# Patient Record
Sex: MALE | Race: BLACK OR AFRICAN AMERICAN | NOT HISPANIC OR LATINO
[De-identification: names, ages, dates, MRNs, and addresses within clinical notes are randomized per-mention and may not be internally consistent; named-entity substitution may affect disease eponyms.]

---

## 2016-09-14 RX ORDER — ATENOLOL 25 MG/1
1 TABLET ORAL
Qty: 0 | Refills: 0 | COMMUNITY
Start: 2016-09-14

## 2017-11-07 ENCOUNTER — APPOINTMENT (OUTPATIENT)
Dept: TRANSPLANT | Facility: CLINIC | Age: 67
End: 2017-11-07
Payer: COMMERCIAL

## 2017-11-07 ENCOUNTER — APPOINTMENT (OUTPATIENT)
Dept: NEPHROLOGY | Facility: CLINIC | Age: 67
End: 2017-11-07
Payer: COMMERCIAL

## 2017-11-07 VITALS
WEIGHT: 219 LBS | DIASTOLIC BLOOD PRESSURE: 71 MMHG | HEART RATE: 87 BPM | TEMPERATURE: 99.1 F | HEIGHT: 70 IN | RESPIRATION RATE: 17 BRPM | SYSTOLIC BLOOD PRESSURE: 122 MMHG | BODY MASS INDEX: 31.35 KG/M2

## 2017-11-07 PROCEDURE — 99205 OFFICE O/P NEW HI 60 MIN: CPT

## 2017-11-07 PROCEDURE — 99204 OFFICE O/P NEW MOD 45 MIN: CPT

## 2017-11-13 ENCOUNTER — APPOINTMENT (OUTPATIENT)
Dept: TRANSPLANT | Facility: CLINIC | Age: 67
End: 2017-11-13

## 2017-11-14 ENCOUNTER — APPOINTMENT (OUTPATIENT)
Dept: INFECTIOUS DISEASE | Facility: CLINIC | Age: 67
End: 2017-11-14
Payer: MEDICARE

## 2017-11-14 ENCOUNTER — LABORATORY RESULT (OUTPATIENT)
Age: 67
End: 2017-11-14

## 2017-11-14 VITALS
RESPIRATION RATE: 18 BRPM | WEIGHT: 220 LBS | TEMPERATURE: 97.3 F | OXYGEN SATURATION: 88 % | HEART RATE: 81 BPM | DIASTOLIC BLOOD PRESSURE: 71 MMHG | HEIGHT: 70 IN | SYSTOLIC BLOOD PRESSURE: 122 MMHG | BODY MASS INDEX: 31.5 KG/M2

## 2017-11-14 DIAGNOSIS — A53.0 LATENT SYPHILIS, UNSPECIFIED AS EARLY OR LATE: ICD-10-CM

## 2017-11-14 PROCEDURE — 99204 OFFICE O/P NEW MOD 45 MIN: CPT

## 2017-11-21 LAB — T PALLIDUM AB SER QL IA: POSITIVE

## 2017-12-13 ENCOUNTER — APPOINTMENT (OUTPATIENT)
Dept: CARDIOLOGY | Facility: CLINIC | Age: 67
End: 2017-12-13
Payer: COMMERCIAL

## 2017-12-13 ENCOUNTER — NON-APPOINTMENT (OUTPATIENT)
Age: 67
End: 2017-12-13

## 2017-12-13 VITALS
HEIGHT: 70 IN | DIASTOLIC BLOOD PRESSURE: 55 MMHG | WEIGHT: 216 LBS | SYSTOLIC BLOOD PRESSURE: 92 MMHG | OXYGEN SATURATION: 98 % | HEART RATE: 84 BPM | BODY MASS INDEX: 30.92 KG/M2

## 2017-12-13 PROCEDURE — 93000 ELECTROCARDIOGRAM COMPLETE: CPT

## 2017-12-13 PROCEDURE — 99203 OFFICE O/P NEW LOW 30 MIN: CPT

## 2017-12-13 RX ORDER — AMOXICILLIN 500 MG/1
500 CAPSULE ORAL
Qty: 4 | Refills: 0 | Status: DISCONTINUED | COMMUNITY
Start: 2017-11-24 | End: 2017-12-13

## 2018-03-06 ENCOUNTER — APPOINTMENT (OUTPATIENT)
Dept: ULTRASOUND IMAGING | Facility: CLINIC | Age: 68
End: 2018-03-06
Payer: COMMERCIAL

## 2018-03-06 ENCOUNTER — OUTPATIENT (OUTPATIENT)
Dept: OUTPATIENT SERVICES | Facility: HOSPITAL | Age: 68
LOS: 1 days | End: 2018-03-06
Payer: COMMERCIAL

## 2018-03-06 ENCOUNTER — APPOINTMENT (OUTPATIENT)
Dept: RADIOLOGY | Facility: CLINIC | Age: 68
End: 2018-03-06
Payer: COMMERCIAL

## 2018-03-06 DIAGNOSIS — Z90.5 ACQUIRED ABSENCE OF KIDNEY: Chronic | ICD-10-CM

## 2018-03-06 DIAGNOSIS — Z01.818 ENCOUNTER FOR OTHER PREPROCEDURAL EXAMINATION: ICD-10-CM

## 2018-03-06 DIAGNOSIS — I77.0 ARTERIOVENOUS FISTULA, ACQUIRED: Chronic | ICD-10-CM

## 2018-03-06 DIAGNOSIS — Z00.8 ENCOUNTER FOR OTHER GENERAL EXAMINATION: ICD-10-CM

## 2018-03-06 PROCEDURE — 71046 X-RAY EXAM CHEST 2 VIEWS: CPT | Mod: 26

## 2018-03-06 PROCEDURE — 93978 VASCULAR STUDY: CPT

## 2018-03-06 PROCEDURE — 76700 US EXAM ABDOM COMPLETE: CPT | Mod: 26

## 2018-03-06 PROCEDURE — 71046 X-RAY EXAM CHEST 2 VIEWS: CPT

## 2018-03-06 PROCEDURE — 93978 VASCULAR STUDY: CPT | Mod: 26

## 2018-03-06 PROCEDURE — 76700 US EXAM ABDOM COMPLETE: CPT

## 2018-03-22 ENCOUNTER — CHART COPY (OUTPATIENT)
Age: 68
End: 2018-03-22

## 2018-04-03 ENCOUNTER — OUTPATIENT (OUTPATIENT)
Dept: OUTPATIENT SERVICES | Facility: HOSPITAL | Age: 68
LOS: 1 days | Discharge: HOME | End: 2018-04-03
Payer: MEDICARE

## 2018-04-03 DIAGNOSIS — N19 UNSPECIFIED KIDNEY FAILURE: ICD-10-CM

## 2018-04-03 DIAGNOSIS — Z90.5 ACQUIRED ABSENCE OF KIDNEY: Chronic | ICD-10-CM

## 2018-04-03 DIAGNOSIS — I77.0 ARTERIOVENOUS FISTULA, ACQUIRED: Chronic | ICD-10-CM

## 2018-04-03 LAB
BUN SERPL-MCNC: 38 MG/DL — HIGH (ref 7–23)
CALCIUM SERPL-MCNC: 8.6 MG/DL — SIGNIFICANT CHANGE UP (ref 8.4–10.5)
CHLORIDE SERPL-SCNC: 99 MMOL/L — SIGNIFICANT CHANGE UP (ref 98–107)
CO2 SERPL-SCNC: 23 MMOL/L — SIGNIFICANT CHANGE UP (ref 22–31)
CREAT SERPL-MCNC: 10.2 MG/DL — HIGH (ref 0.5–1.3)
GLUCOSE BLDC GLUCOMTR-MCNC: 72 MG/DL — SIGNIFICANT CHANGE UP (ref 70–99)
GLUCOSE BLDC GLUCOMTR-MCNC: 94 MG/DL — SIGNIFICANT CHANGE UP (ref 70–99)
GLUCOSE SERPL-MCNC: 99 MG/DL — SIGNIFICANT CHANGE UP (ref 70–99)
HBA1C BLD-MCNC: 5.9 % — HIGH (ref 4–5.6)
HCT VFR BLD CALC: 31.1 % — LOW (ref 39–50)
HGB BLD-MCNC: 10.2 G/DL — LOW (ref 13–17)
MCHC RBC-ENTMCNC: 32.8 % — SIGNIFICANT CHANGE UP (ref 32–36)
MCHC RBC-ENTMCNC: 33.1 PG — SIGNIFICANT CHANGE UP (ref 27–34)
MCV RBC AUTO: 101 FL — HIGH (ref 80–100)
NRBC # FLD: 0 — SIGNIFICANT CHANGE UP
PLATELET # BLD AUTO: 153 K/UL — SIGNIFICANT CHANGE UP (ref 150–400)
PMV BLD: 9.2 FL — SIGNIFICANT CHANGE UP (ref 7–13)
POTASSIUM SERPL-MCNC: 4 MMOL/L — SIGNIFICANT CHANGE UP (ref 3.5–5.3)
POTASSIUM SERPL-SCNC: 4 MMOL/L — SIGNIFICANT CHANGE UP (ref 3.5–5.3)
RBC # BLD: 3.08 M/UL — LOW (ref 4.2–5.8)
RBC # FLD: 16 % — HIGH (ref 10.3–14.5)
SODIUM SERPL-SCNC: 138 MMOL/L — SIGNIFICANT CHANGE UP (ref 135–145)
WBC # BLD: 5.55 K/UL — SIGNIFICANT CHANGE UP (ref 3.8–10.5)
WBC # FLD AUTO: 5.55 K/UL — SIGNIFICANT CHANGE UP (ref 3.8–10.5)

## 2018-04-03 PROCEDURE — 76937 US GUIDE VASCULAR ACCESS: CPT | Mod: 26

## 2018-04-03 PROCEDURE — 93010 ELECTROCARDIOGRAM REPORT: CPT

## 2018-04-03 PROCEDURE — 93458 L HRT ARTERY/VENTRICLE ANGIO: CPT | Mod: 26

## 2018-04-03 RX ORDER — SODIUM CHLORIDE 9 MG/ML
3 INJECTION INTRAMUSCULAR; INTRAVENOUS; SUBCUTANEOUS EVERY 8 HOURS
Qty: 0 | Refills: 0 | Status: DISCONTINUED | OUTPATIENT
Start: 2018-04-03 | End: 2018-04-18

## 2018-04-03 NOTE — H&P CARDIOLOGY - NEGATIVE CARDIOVASCULAR SYMPTOMS
no orthopnea/no chest pain/no peripheral edema/no paroxysmal nocturnal dyspnea/no palpitations/no claudication/no dyspnea on exertion

## 2018-04-03 NOTE — H&P CARDIOLOGY - RS GEN PE MLT RESP DETAILS PC
good air movement/no chest wall tenderness/clear to auscultation bilaterally/respirations non-labored/airway patent/breath sounds equal

## 2018-04-03 NOTE — H&P CARDIOLOGY - PMH
DM (diabetes mellitus)    ESRD (end stage renal disease) on dialysis    HTN (hypertension)    Kidney cancer, primary, with metastasis from kidney to other site  with no mets

## 2018-04-03 NOTE — H&P CARDIOLOGY - NEGATIVE NEUROLOGICAL SYMPTOMS
no syncope/no focal seizures/no difficulty walking/no transient paralysis/no loss of sensation/no confusion/no loss of consciousness/no hemiparesis/no tremors/no weakness/no paresthesias/no generalized seizures/no vertigo/no headache/no facial palsy

## 2018-04-03 NOTE — H&P CARDIOLOGY - PSH
AV fistula  2/2013/ left forearm  S/p nephrectomy  left 9/15/2015  S/p nephrectomy  right 12/10/2015

## 2018-04-03 NOTE — H&P CARDIOLOGY - HISTORY OF PRESENT ILLNESS
66 y/o M w/ PMH of HTN, DM, ESRD on HD presents for cardiac catheretization. 66 y/o M w/ PMH of HTN, DM, ESRD on HD presents for cardiac catheretization. Pt is being cleared for a kidney transplant and will need cardiac clearance prior to surgery. Pt denies N/V/D, fevers, chills, cough, palpitations, chest pain, substernal distress, syncope, dyspnea on exertion, orthopnea, nocturnal paroxysmal dyspnea, edema, cyanosis, heart murmurs, varicosities, phlebitis, claudication. 66 y/o M w/ PMH of Kidney CA s/p bilateral nephrectomy, HTN, DM, ESRD on home HD presents for cardiac catheretization. Pt is being cleared for a kidney transplant and will need cardiac clearance prior to surgery. Pt denies N/V/D, fevers, chills, cough, palpitations, chest pain, substernal distress, syncope, dyspnea on exertion, orthopnea, nocturnal paroxysmal dyspnea, edema, cyanosis, heart murmurs, varicosities, phlebitis, claudication.

## 2018-04-17 ENCOUNTER — OUTPATIENT (OUTPATIENT)
Dept: OUTPATIENT SERVICES | Facility: HOSPITAL | Age: 68
LOS: 1 days | End: 2018-04-17
Payer: COMMERCIAL

## 2018-04-17 ENCOUNTER — APPOINTMENT (OUTPATIENT)
Dept: CT IMAGING | Facility: CLINIC | Age: 68
End: 2018-04-17
Payer: COMMERCIAL

## 2018-04-17 DIAGNOSIS — Z90.5 ACQUIRED ABSENCE OF KIDNEY: Chronic | ICD-10-CM

## 2018-04-17 DIAGNOSIS — Z01.818 ENCOUNTER FOR OTHER PREPROCEDURAL EXAMINATION: ICD-10-CM

## 2018-04-17 DIAGNOSIS — I77.0 ARTERIOVENOUS FISTULA, ACQUIRED: Chronic | ICD-10-CM

## 2018-04-17 PROCEDURE — 74178 CT ABD&PLV WO CNTR FLWD CNTR: CPT

## 2018-04-17 PROCEDURE — 74178 CT ABD&PLV WO CNTR FLWD CNTR: CPT | Mod: 26

## 2018-05-25 ENCOUNTER — APPOINTMENT (OUTPATIENT)
Dept: UROLOGY | Facility: CLINIC | Age: 68
End: 2018-05-25
Payer: MEDICARE

## 2018-05-25 PROCEDURE — 99204 OFFICE O/P NEW MOD 45 MIN: CPT

## 2018-05-30 LAB — PSA SERPL-MCNC: 3.41 NG/ML

## 2018-06-12 ENCOUNTER — APPOINTMENT (OUTPATIENT)
Dept: UROLOGY | Facility: CLINIC | Age: 68
End: 2018-06-12
Payer: MEDICARE

## 2018-06-12 ENCOUNTER — OUTPATIENT (OUTPATIENT)
Dept: OUTPATIENT SERVICES | Facility: HOSPITAL | Age: 68
LOS: 1 days | End: 2018-06-12
Payer: MEDICARE

## 2018-06-12 VITALS
HEART RATE: 84 BPM | DIASTOLIC BLOOD PRESSURE: 75 MMHG | TEMPERATURE: 98.5 F | RESPIRATION RATE: 18 BRPM | SYSTOLIC BLOOD PRESSURE: 111 MMHG

## 2018-06-12 DIAGNOSIS — Z90.5 ACQUIRED ABSENCE OF KIDNEY: Chronic | ICD-10-CM

## 2018-06-12 DIAGNOSIS — I77.0 ARTERIOVENOUS FISTULA, ACQUIRED: Chronic | ICD-10-CM

## 2018-06-12 DIAGNOSIS — R35.0 FREQUENCY OF MICTURITION: ICD-10-CM

## 2018-06-12 PROCEDURE — 99213 OFFICE O/P EST LOW 20 MIN: CPT | Mod: 25

## 2018-06-12 PROCEDURE — 52000 CYSTOURETHROSCOPY: CPT

## 2018-06-20 DIAGNOSIS — Z86.03 PERSONAL HISTORY OF NEOPLASM OF UNCERTAIN BEHAVIOR: ICD-10-CM

## 2018-07-08 ENCOUNTER — MOBILE ON CALL (OUTPATIENT)
Age: 68
End: 2018-07-08

## 2018-07-13 ENCOUNTER — MOBILE ON CALL (OUTPATIENT)
Age: 68
End: 2018-07-13

## 2018-07-16 ENCOUNTER — TRANSCRIPTION ENCOUNTER (OUTPATIENT)
Age: 68
End: 2018-07-16

## 2018-07-17 ENCOUNTER — INPATIENT (INPATIENT)
Facility: HOSPITAL | Age: 68
LOS: 7 days | Discharge: HOME | DRG: 652 | End: 2018-07-25
Payer: MEDICARE

## 2018-07-17 VITALS
TEMPERATURE: 99 F | HEART RATE: 95 BPM | HEIGHT: 70.5 IN | WEIGHT: 282.19 LBS | DIASTOLIC BLOOD PRESSURE: 65 MMHG | RESPIRATION RATE: 18 BRPM | OXYGEN SATURATION: 99 % | SYSTOLIC BLOOD PRESSURE: 109 MMHG

## 2018-07-17 DIAGNOSIS — N18.6 END STAGE RENAL DISEASE: ICD-10-CM

## 2018-07-17 DIAGNOSIS — Z94.0 KIDNEY TRANSPLANT STATUS: ICD-10-CM

## 2018-07-17 DIAGNOSIS — Z94.9 TRANSPLANTED ORGAN AND TISSUE STATUS, UNSPECIFIED: ICD-10-CM

## 2018-07-17 DIAGNOSIS — Z90.5 ACQUIRED ABSENCE OF KIDNEY: Chronic | ICD-10-CM

## 2018-07-17 DIAGNOSIS — I77.0 ARTERIOVENOUS FISTULA, ACQUIRED: Chronic | ICD-10-CM

## 2018-07-17 LAB
ALBUMIN SERPL ELPH-MCNC: 4 G/DL — SIGNIFICANT CHANGE UP (ref 3.3–5)
ALP SERPL-CCNC: 90 U/L — SIGNIFICANT CHANGE UP (ref 40–120)
ALT FLD-CCNC: 15 U/L — SIGNIFICANT CHANGE UP (ref 10–45)
ANION GAP SERPL CALC-SCNC: 19 MMOL/L — HIGH (ref 5–17)
APTT BLD: 29.7 SEC — SIGNIFICANT CHANGE UP (ref 27.5–37.4)
AST SERPL-CCNC: 19 U/L — SIGNIFICANT CHANGE UP (ref 10–40)
BASOPHILS # BLD AUTO: 0 K/UL — SIGNIFICANT CHANGE UP (ref 0–0.2)
BASOPHILS NFR BLD AUTO: 0.6 % — SIGNIFICANT CHANGE UP (ref 0–2)
BILIRUB SERPL-MCNC: 0.2 MG/DL — SIGNIFICANT CHANGE UP (ref 0.2–1.2)
BLD GP AB SCN SERPL QL: NEGATIVE — SIGNIFICANT CHANGE UP
BUN SERPL-MCNC: 23 MG/DL — SIGNIFICANT CHANGE UP (ref 7–23)
CALCIUM SERPL-MCNC: 9.3 MG/DL — SIGNIFICANT CHANGE UP (ref 8.4–10.5)
CHLORIDE SERPL-SCNC: 98 MMOL/L — SIGNIFICANT CHANGE UP (ref 96–108)
CO2 SERPL-SCNC: 23 MMOL/L — SIGNIFICANT CHANGE UP (ref 22–31)
CREAT SERPL-MCNC: 9.53 MG/DL — HIGH (ref 0.5–1.3)
EOSINOPHIL # BLD AUTO: 0.4 K/UL — SIGNIFICANT CHANGE UP (ref 0–0.5)
EOSINOPHIL NFR BLD AUTO: 5 % — SIGNIFICANT CHANGE UP (ref 0–6)
GLUCOSE BLDC GLUCOMTR-MCNC: 100 MG/DL — HIGH (ref 70–99)
GLUCOSE BLDC GLUCOMTR-MCNC: 130 MG/DL — HIGH (ref 70–99)
GLUCOSE BLDC GLUCOMTR-MCNC: 95 MG/DL — SIGNIFICANT CHANGE UP (ref 70–99)
GLUCOSE SERPL-MCNC: 125 MG/DL — HIGH (ref 70–99)
HBA1C BLD-MCNC: 6 % — HIGH (ref 4–5.6)
HCT VFR BLD CALC: 33 % — LOW (ref 39–50)
HGB BLD-MCNC: 11 G/DL — LOW (ref 13–17)
INR BLD: 1 RATIO — SIGNIFICANT CHANGE UP (ref 0.88–1.16)
LYMPHOCYTES # BLD AUTO: 0.9 K/UL — LOW (ref 1–3.3)
LYMPHOCYTES # BLD AUTO: 12.3 % — LOW (ref 13–44)
MAGNESIUM SERPL-MCNC: 1.6 MG/DL — SIGNIFICANT CHANGE UP (ref 1.6–2.6)
MCHC RBC-ENTMCNC: 33.3 GM/DL — SIGNIFICANT CHANGE UP (ref 32–36)
MCHC RBC-ENTMCNC: 34.8 PG — HIGH (ref 27–34)
MCV RBC AUTO: 105 FL — HIGH (ref 80–100)
MONOCYTES # BLD AUTO: 0.8 K/UL — SIGNIFICANT CHANGE UP (ref 0–0.9)
MONOCYTES NFR BLD AUTO: 11.7 % — SIGNIFICANT CHANGE UP (ref 2–14)
NEUTROPHILS # BLD AUTO: 5 K/UL — SIGNIFICANT CHANGE UP (ref 1.8–7.4)
NEUTROPHILS NFR BLD AUTO: 70.6 % — SIGNIFICANT CHANGE UP (ref 43–77)
PHOSPHATE SERPL-MCNC: 3.9 MG/DL — SIGNIFICANT CHANGE UP (ref 2.5–4.5)
PLATELET # BLD AUTO: 142 K/UL — LOW (ref 150–400)
POTASSIUM SERPL-MCNC: 4.1 MMOL/L — SIGNIFICANT CHANGE UP (ref 3.5–5.3)
POTASSIUM SERPL-SCNC: 4.1 MMOL/L — SIGNIFICANT CHANGE UP (ref 3.5–5.3)
PROT SERPL-MCNC: 7.2 G/DL — SIGNIFICANT CHANGE UP (ref 6–8.3)
PROTHROM AB SERPL-ACNC: 10.8 SEC — SIGNIFICANT CHANGE UP (ref 9.8–12.7)
RBC # BLD: 3.15 M/UL — LOW (ref 4.2–5.8)
RBC # FLD: 14.6 % — HIGH (ref 10.3–14.5)
RH IG SCN BLD-IMP: POSITIVE — SIGNIFICANT CHANGE UP
RH IG SCN BLD-IMP: POSITIVE — SIGNIFICANT CHANGE UP
SODIUM SERPL-SCNC: 140 MMOL/L — SIGNIFICANT CHANGE UP (ref 135–145)
WBC # BLD: 7.1 K/UL — SIGNIFICANT CHANGE UP (ref 3.8–10.5)
WBC # FLD AUTO: 7.1 K/UL — SIGNIFICANT CHANGE UP (ref 3.8–10.5)

## 2018-07-17 PROCEDURE — 50360 RNL ALTRNSPLJ W/O RCP NFRCT: CPT

## 2018-07-17 PROCEDURE — 93010 ELECTROCARDIOGRAM REPORT: CPT

## 2018-07-17 PROCEDURE — 71045 X-RAY EXAM CHEST 1 VIEW: CPT | Mod: 26,77

## 2018-07-17 PROCEDURE — 99223 1ST HOSP IP/OBS HIGH 75: CPT | Mod: GC

## 2018-07-17 PROCEDURE — 71045 X-RAY EXAM CHEST 1 VIEW: CPT | Mod: 26

## 2018-07-17 PROCEDURE — 50605 INSERT URETERAL SUPPORT: CPT

## 2018-07-17 RX ORDER — BASILIXIMAB 20 MG/5ML
20 INJECTION, POWDER, FOR SOLUTION INTRAVENOUS ONCE
Qty: 0 | Refills: 0 | Status: DISCONTINUED | OUTPATIENT
Start: 2018-07-17 | End: 2018-07-17

## 2018-07-17 RX ORDER — CEFAZOLIN SODIUM 1 G
2000 VIAL (EA) INJECTION ONCE
Qty: 0 | Refills: 0 | Status: DISCONTINUED | OUTPATIENT
Start: 2018-07-17 | End: 2018-07-17

## 2018-07-17 NOTE — H&P ADULT - ASSESSMENT
67M presents for DDRT  - Will obtain full labs  - NPO  - Will proceed to operating room in afternoon of 7/17

## 2018-07-17 NOTE — H&P ADULT - NSHPLABSRESULTS_GEN_ALL_CORE
07-17    140  |  98  |  23  ----------------------------<  125<H>  4.1   |  23  |  9.53<H>    Ca    9.3      17 Jul 2018 10:21  Phos  3.9     07-17  Mg     1.6     07-17    TPro  7.2  /  Alb  4.0  /  TBili  0.2  /  DBili  x   /  AST  19  /  ALT  15  /  AlkPhos  90  07-17      CAPILLARY BLOOD GLUCOSE      POCT Blood Glucose.: 130 mg/dL (17 Jul 2018 09:54)      MEDICATIONS  (STANDING):    MEDICATIONS  (PRN):

## 2018-07-17 NOTE — CONSULT NOTE ADULT - SUBJECTIVE AND OBJECTIVE BOX
[facility] DIVISION OF KIDNEY DISEASES AND HYPERTENSION -- INITIAL CONSULT NOTE  --------------------------------------------------------------------------------  HPI: 67 year old male with a PMH of ESRD on HD (2015), DM, HTN, bilateral nephrectomy (2015) admitted for possible DDRT. Pt last HD was on Monday prior to presentation. Pt usually has HD MWF and follows with outpatient nephrologist Dr. Webb. Pt seen and examined. Pt denies CP, SOB or LE edema.      PAST HISTORY  --------------------------------------------------------------------------------  PAST MEDICAL & SURGICAL HISTORY:  ESRD (end stage renal disease) on dialysis  Kidney cancer, primary, with metastasis from kidney to other site: with no mets  HTN (hypertension)  DM (diabetes mellitus)  S/p nephrectomy: right 12/10/2015  S/p nephrectomy: left 9/15/2015  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    FAMILY HISTORY:  No pertinent family history in first degree relatives    PAST SOCIAL HISTORY:    ALLERGIES & MEDICATIONS  --------------------------------------------------------------------------------  Allergies    No Known Allergies    Intolerances      Standing Inpatient Medications    PRN Inpatient Medications      REVIEW OF SYSTEMS  --------------------------------------------------------------------------------  Gen: No weakness  Skin: No rashes  Head/Eyes/Ears/Mouth: No headache  Respiratory: No dyspnea  CV: No chest pain, PND, orthopnea  GI: No abdominal pain  : No increased frequency  MSK: No edema  Neuro: No dizziness/lightheadedness  Heme: No easy bruising or bleeding  All other systems were reviewed and are negative, except as noted.    VITALS/PHYSICAL EXAM  --------------------------------------------------------------------------------  T(C): 37.3 (07-17-18 @ 08:10), Max: 37.3 (07-17-18 @ 08:10)  HR: 95 (07-17-18 @ 08:10) (95 - 95)  BP: 109/65 (07-17-18 @ 08:10) (109/65 - 109/65)  RR: 18 (07-17-18 @ 08:10) (18 - 18)  SpO2: 99% (07-17-18 @ 08:10) (99% - 99%)  Wt(kg): --  Height (cm): 179.07 (07-17-18 @ 08:10)  Weight (kg): 128 (07-17-18 @ 08:10)  BMI (kg/m2): 39.9 (07-17-18 @ 08:10)  BSA (m2): 2.43 (07-17-18 @ 08:10)      Physical Exam:  	Gen: NAD  	Pulm: CTA B/L  	CV: RRR, S1S2; no rub  	Abd: +BS, soft, nontender/nondistended + midline healed scar  	: No suprapubic tenderness  	UE: Warm, no asterixis  	LE: Warm, no edema  	Psych: Normal affect and mood  	Skin: Warm, without rashes  	Vascular access:  L AVF     LABS/STUDIES  --------------------------------------------------------------------------------    140  |  98  |  23  ----------------------------<  125      [07-17-18 @ 10:21]  4.1   |  23  |  9.53        Ca     9.3     [07-17-18 @ 10:21]      Mg     1.6     [07-17-18 @ 10:21]      Phos  3.9     [07-17-18 @ 10:21]    TPro  7.2  /  Alb  4.0  /  TBili  0.2  /  DBili  x   /  AST  19  /  ALT  15  /  AlkPhos  90  [07-17-18 @ 10:21]    Creatinine Trend:  SCr 9.53 [07-17 @ 10:21]    HbA1c 5.9      [04-03-18 @ 14:09]    HBsAb 151.5      [09-14-16 @ 18:00]  HBsAg Nonreact      [09-14-16 @ 18:00]  HCV 0.07, Nonreact      [09-14-16 @ 18:00]

## 2018-07-17 NOTE — H&P ADULT - NSHPREVIEWOFSYSTEMS_GEN_ALL_CORE
The patient denies fever, chills; chest pain, SOB, palpitation; dizziness, weakness; nausea, vomiting; diarrhea, constipation; abdominal pain; bladder and bowel problems; leg swelling.

## 2018-07-17 NOTE — H&P ADULT - NSHPPHYSICALEXAM_GEN_ALL_CORE
Gen: WD, WN, in no acute distress.  Lungs: CTA B/L.  Cor: RRR, S1 S2, No M/G/R.  Abd: Soft, ND, NT,No HSM, +BS.  Ext: No clubbing, no edema. DP pulse on R palpable  Neuro: A/Ox3. No focal deficit.

## 2018-07-17 NOTE — CONSULT NOTE ADULT - PROBLEM SELECTOR RECOMMENDATION 9
ESRD on HD. Pt last outpatient HD was on 7/16/18. No acute indication for additional HD at this time. Monitor BMP

## 2018-07-17 NOTE — CONSULT NOTE ADULT - ASSESSMENT
67 year old male with a PMH of ESRD on HD (2015), DM, HTN, bilateral nephrectomy (2015) admitted for possible DDRT.

## 2018-07-17 NOTE — H&P ADULT - HISTORY OF PRESENT ILLNESS
67M with ESRD presents for DDRT on 7/17/18 67M with ESRD presents for DDRT on 18    EK17 normal sinus rhythm, nonspecific ST abnormality    * Stress 3/30/17 [place] ECG stress was neg for ischemia at submaximal heart rate. The myocardial perfusion imaging showed mild ischemia and no infarct. Gated study showed and EF 53%. Medium size mild severity defect in the mid inferior wall that is partially reversible and suggestive of ischemia. Rest gated analysis showed normal LV function with normal LV size.    * Cath 18 Non obstructive CAD LCX 20 stenosis RCA 20% stenosis. Non obstructive CAD. Cleared  by Nehemias “overall stress not too bad but would offer a cath if he has a donor….i think we can clear him without cath for listing and we can repeat stress this year if he is asymptomatic. Sometimes the inferior defect can be artifact.” Cancer Screenings:    * Colonoscopy: 17 9 mm polyp proximal transverse colon bx showed tubular adenoma, 4 mm polyp in the mid transverse colon bx showed tubular adenoma, 8 mm polyp in the distal transverse colon bx showed tubular adenoma diverticulosis in the sigmoid colon and in the descending colon. Terminal ileum bx showed benign therminal ileal mucosa without pathological diagnosis. Non bleeding hemorrhoids.    * Endoscopy: 17 Normal castroesophageal junction and esophagus, Z-line regular, 40 cm from the incisors, 2cm hiatl hernia, normal gastroesophageal junction cardia , gastric fundus, gastric body, prepyloric region of the stomach and pylorus, Antral gastritis bx showed benign atrium/fundic pattern mucosa without pathoigic diagnosis neg fot H Pylori. Normal examined duodenom.    * Cystoscopy 18 No lesions. Inspection of the bladder revealed bilateral orthotopic ureteral orifices. There was no bloody efflux from either side. There were also no stones, tumors, or diverticula seen within the bladder. The mucosa of the bladder all appeared to be grossly normal.    * PSA: 17 2.75 Imaging:    * CXR: 3/6/18 clear lungs    * US Abd/doppler: 3/6/18 Liver, Bile ducts, gallbladder, pancreas, spleen WNL. Bilateral Nephrectomies. Aorta and IVC are WNL. The abd aorta and iliac vasculature shows no evidence of stenosis or aneurysmal enlargment. The iliac veins are patent.    * CT Abd/Pelvis WAW IC 4/19/18 Non specific adrenal nodules. Left nodle measuring 2.8X 1.9cm. the right nodule measures 1.1 cm. The bladder is collapsed limiting its eval. Consider cystoscopy to evaluate possible ovoid filling defect within the bladder.    Serologies: Quant Gold:Neg Treponema Pallidum( +) VSV: (+ ) Toxo (+) CMV: ( - ) EBV: (+ ) Rubella: ( + ) HSV 1: (- ) HSV 2: (+ ) HIV: ( - ) HAV: (- ) HCV: ( - ) HBsAg: ( - ) HBcAb: ( - ) HBsAb: ( + )

## 2018-07-18 DIAGNOSIS — D89.9 DISORDER INVOLVING THE IMMUNE MECHANISM, UNSPECIFIED: ICD-10-CM

## 2018-07-18 DIAGNOSIS — I10 ESSENTIAL (PRIMARY) HYPERTENSION: ICD-10-CM

## 2018-07-18 DIAGNOSIS — E11.9 TYPE 2 DIABETES MELLITUS WITHOUT COMPLICATIONS: ICD-10-CM

## 2018-07-18 LAB
ALBUMIN SERPL ELPH-MCNC: 3.1 G/DL — LOW (ref 3.3–5)
ALBUMIN SERPL ELPH-MCNC: 3.5 G/DL — SIGNIFICANT CHANGE UP (ref 3.3–5)
ALP SERPL-CCNC: 65 U/L — SIGNIFICANT CHANGE UP (ref 40–120)
ALP SERPL-CCNC: 68 U/L — SIGNIFICANT CHANGE UP (ref 40–120)
ALT FLD-CCNC: 12 U/L — SIGNIFICANT CHANGE UP (ref 10–45)
ALT FLD-CCNC: 14 U/L — SIGNIFICANT CHANGE UP (ref 10–45)
ANION GAP SERPL CALC-SCNC: 13 MMOL/L — SIGNIFICANT CHANGE UP (ref 5–17)
ANION GAP SERPL CALC-SCNC: 18 MMOL/L — HIGH (ref 5–17)
ANION GAP SERPL CALC-SCNC: 19 MMOL/L — HIGH (ref 5–17)
ANION GAP SERPL CALC-SCNC: 20 MMOL/L — HIGH (ref 5–17)
APTT BLD: 27.4 SEC — LOW (ref 27.5–37.4)
AST SERPL-CCNC: 23 U/L — SIGNIFICANT CHANGE UP (ref 10–40)
AST SERPL-CCNC: 23 U/L — SIGNIFICANT CHANGE UP (ref 10–40)
BILIRUB SERPL-MCNC: 0.2 MG/DL — SIGNIFICANT CHANGE UP (ref 0.2–1.2)
BILIRUB SERPL-MCNC: 0.3 MG/DL — SIGNIFICANT CHANGE UP (ref 0.2–1.2)
BUN SERPL-MCNC: 24 MG/DL — HIGH (ref 7–23)
BUN SERPL-MCNC: 29 MG/DL — HIGH (ref 7–23)
BUN SERPL-MCNC: 34 MG/DL — HIGH (ref 7–23)
BUN SERPL-MCNC: 37 MG/DL — HIGH (ref 7–23)
CALCIUM SERPL-MCNC: 7.9 MG/DL — LOW (ref 8.4–10.5)
CALCIUM SERPL-MCNC: 8.1 MG/DL — LOW (ref 8.4–10.5)
CALCIUM SERPL-MCNC: 8.3 MG/DL — LOW (ref 8.4–10.5)
CALCIUM SERPL-MCNC: 8.4 MG/DL — SIGNIFICANT CHANGE UP (ref 8.4–10.5)
CHLORIDE SERPL-SCNC: 103 MMOL/L — SIGNIFICANT CHANGE UP (ref 96–108)
CHLORIDE SERPL-SCNC: 104 MMOL/L — SIGNIFICANT CHANGE UP (ref 96–108)
CHLORIDE SERPL-SCNC: 104 MMOL/L — SIGNIFICANT CHANGE UP (ref 96–108)
CHLORIDE SERPL-SCNC: 105 MMOL/L — SIGNIFICANT CHANGE UP (ref 96–108)
CO2 SERPL-SCNC: 16 MMOL/L — LOW (ref 22–31)
CO2 SERPL-SCNC: 16 MMOL/L — LOW (ref 22–31)
CO2 SERPL-SCNC: 18 MMOL/L — LOW (ref 22–31)
CO2 SERPL-SCNC: 22 MMOL/L — SIGNIFICANT CHANGE UP (ref 22–31)
CREAT ?TM UR-MCNC: 12 MG/DL — SIGNIFICANT CHANGE UP
CREAT SERPL-MCNC: 10.53 MG/DL — HIGH (ref 0.5–1.3)
CREAT SERPL-MCNC: 10.71 MG/DL — HIGH (ref 0.5–1.3)
CREAT SERPL-MCNC: 10.75 MG/DL — HIGH (ref 0.5–1.3)
CREAT SERPL-MCNC: 6.96 MG/DL — HIGH (ref 0.5–1.3)
GAS PNL BLDA: SIGNIFICANT CHANGE UP
GLUCOSE BLDC GLUCOMTR-MCNC: 178 MG/DL — HIGH (ref 70–99)
GLUCOSE BLDC GLUCOMTR-MCNC: 223 MG/DL — HIGH (ref 70–99)
GLUCOSE BLDC GLUCOMTR-MCNC: 242 MG/DL — HIGH (ref 70–99)
GLUCOSE SERPL-MCNC: 161 MG/DL — HIGH (ref 70–99)
GLUCOSE SERPL-MCNC: 244 MG/DL — HIGH (ref 70–99)
GLUCOSE SERPL-MCNC: 247 MG/DL — HIGH (ref 70–99)
GLUCOSE SERPL-MCNC: 249 MG/DL — HIGH (ref 70–99)
HBV CORE AB SER-ACNC: SIGNIFICANT CHANGE UP
HBV SURFACE AB SER-ACNC: 5.6 MIU/ML — LOW
HBV SURFACE AG SER-ACNC: SIGNIFICANT CHANGE UP
HCT VFR BLD CALC: 30 % — LOW (ref 39–50)
HCT VFR BLD CALC: 30.6 % — LOW (ref 39–50)
HCT VFR BLD CALC: 30.8 % — LOW (ref 39–50)
HCV AB S/CO SERPL IA: 0.27 S/CO — SIGNIFICANT CHANGE UP
HCV AB SERPL-IMP: SIGNIFICANT CHANGE UP
HGB BLD-MCNC: 10.1 G/DL — LOW (ref 13–17)
HGB BLD-MCNC: 10.2 G/DL — LOW (ref 13–17)
HGB BLD-MCNC: 10.3 G/DL — LOW (ref 13–17)
INR BLD: 1.07 RATIO — SIGNIFICANT CHANGE UP (ref 0.88–1.16)
MAGNESIUM SERPL-MCNC: 1.3 MG/DL — LOW (ref 1.6–2.6)
MAGNESIUM SERPL-MCNC: 1.4 MG/DL — LOW (ref 1.6–2.6)
MAGNESIUM SERPL-MCNC: 2 MG/DL — SIGNIFICANT CHANGE UP (ref 1.6–2.6)
MAGNESIUM SERPL-MCNC: 2.4 MG/DL — SIGNIFICANT CHANGE UP (ref 1.6–2.6)
MCHC RBC-ENTMCNC: 32.8 GM/DL — SIGNIFICANT CHANGE UP (ref 32–36)
MCHC RBC-ENTMCNC: 33.6 GM/DL — SIGNIFICANT CHANGE UP (ref 32–36)
MCHC RBC-ENTMCNC: 33.8 GM/DL — SIGNIFICANT CHANGE UP (ref 32–36)
MCHC RBC-ENTMCNC: 34.5 PG — HIGH (ref 27–34)
MCHC RBC-ENTMCNC: 35.1 PG — HIGH (ref 27–34)
MCHC RBC-ENTMCNC: 35.5 PG — HIGH (ref 27–34)
MCV RBC AUTO: 104 FL — HIGH (ref 80–100)
MCV RBC AUTO: 105 FL — HIGH (ref 80–100)
MCV RBC AUTO: 105 FL — HIGH (ref 80–100)
OSMOLALITY UR: 306 MOS/KG — SIGNIFICANT CHANGE UP (ref 300–900)
PHOSPHATE SERPL-MCNC: 3.2 MG/DL — SIGNIFICANT CHANGE UP (ref 2.5–4.5)
PHOSPHATE SERPL-MCNC: 4.6 MG/DL — HIGH (ref 2.5–4.5)
PHOSPHATE SERPL-MCNC: 5.8 MG/DL — HIGH (ref 2.5–4.5)
PHOSPHATE SERPL-MCNC: 6 MG/DL — HIGH (ref 2.5–4.5)
PLATELET # BLD AUTO: 136 K/UL — LOW (ref 150–400)
PLATELET # BLD AUTO: 136 K/UL — LOW (ref 150–400)
PLATELET # BLD AUTO: 142 K/UL — LOW (ref 150–400)
POTASSIUM SERPL-MCNC: 3.8 MMOL/L — SIGNIFICANT CHANGE UP (ref 3.5–5.3)
POTASSIUM SERPL-MCNC: 4.1 MMOL/L — SIGNIFICANT CHANGE UP (ref 3.5–5.3)
POTASSIUM SERPL-MCNC: 4.9 MMOL/L — SIGNIFICANT CHANGE UP (ref 3.5–5.3)
POTASSIUM SERPL-MCNC: 5.2 MMOL/L — SIGNIFICANT CHANGE UP (ref 3.5–5.3)
POTASSIUM SERPL-SCNC: 3.8 MMOL/L — SIGNIFICANT CHANGE UP (ref 3.5–5.3)
POTASSIUM SERPL-SCNC: 4.1 MMOL/L — SIGNIFICANT CHANGE UP (ref 3.5–5.3)
POTASSIUM SERPL-SCNC: 4.9 MMOL/L — SIGNIFICANT CHANGE UP (ref 3.5–5.3)
POTASSIUM SERPL-SCNC: 5.2 MMOL/L — SIGNIFICANT CHANGE UP (ref 3.5–5.3)
PROT SERPL-MCNC: 5.9 G/DL — LOW (ref 6–8.3)
PROT SERPL-MCNC: 6.1 G/DL — SIGNIFICANT CHANGE UP (ref 6–8.3)
PROTHROM AB SERPL-ACNC: 11.6 SEC — SIGNIFICANT CHANGE UP (ref 9.8–12.7)
RBC # BLD: 2.86 M/UL — LOW (ref 4.2–5.8)
RBC # BLD: 2.93 M/UL — LOW (ref 4.2–5.8)
RBC # BLD: 2.93 M/UL — LOW (ref 4.2–5.8)
RBC # FLD: 14.7 % — HIGH (ref 10.3–14.5)
SODIUM SERPL-SCNC: 138 MMOL/L — SIGNIFICANT CHANGE UP (ref 135–145)
SODIUM SERPL-SCNC: 139 MMOL/L — SIGNIFICANT CHANGE UP (ref 135–145)
SODIUM SERPL-SCNC: 140 MMOL/L — SIGNIFICANT CHANGE UP (ref 135–145)
SODIUM SERPL-SCNC: 141 MMOL/L — SIGNIFICANT CHANGE UP (ref 135–145)
SODIUM UR-SCNC: 143 MMOL/L — SIGNIFICANT CHANGE UP
WBC # BLD: 12 K/UL — HIGH (ref 3.8–10.5)
WBC # BLD: 12.6 K/UL — HIGH (ref 3.8–10.5)
WBC # BLD: 13.5 K/UL — HIGH (ref 3.8–10.5)
WBC # FLD AUTO: 12 K/UL — HIGH (ref 3.8–10.5)
WBC # FLD AUTO: 12.6 K/UL — HIGH (ref 3.8–10.5)
WBC # FLD AUTO: 13.5 K/UL — HIGH (ref 3.8–10.5)

## 2018-07-18 PROCEDURE — 76776 US EXAM K TRANSPL W/DOPPLER: CPT | Mod: 26,RT

## 2018-07-18 PROCEDURE — 99233 SBSQ HOSP IP/OBS HIGH 50: CPT | Mod: GC

## 2018-07-18 PROCEDURE — 99232 SBSQ HOSP IP/OBS MODERATE 35: CPT | Mod: GC

## 2018-07-18 PROCEDURE — 76604 US EXAM CHEST: CPT | Mod: 26

## 2018-07-18 PROCEDURE — 99291 CRITICAL CARE FIRST HOUR: CPT

## 2018-07-18 RX ORDER — INSULIN LISPRO 100/ML
VIAL (ML) SUBCUTANEOUS
Qty: 0 | Refills: 0 | Status: DISCONTINUED | OUTPATIENT
Start: 2018-07-18 | End: 2018-07-18

## 2018-07-18 RX ORDER — VALGANCICLOVIR 450 MG/1
900 TABLET, FILM COATED ORAL DAILY
Qty: 0 | Refills: 0 | Status: DISCONTINUED | OUTPATIENT
Start: 2018-07-18 | End: 2018-07-25

## 2018-07-18 RX ORDER — SODIUM CHLORIDE 9 MG/ML
1000 INJECTION, SOLUTION INTRAVENOUS
Qty: 0 | Refills: 0 | Status: DISCONTINUED | OUTPATIENT
Start: 2018-07-18 | End: 2018-07-18

## 2018-07-18 RX ORDER — ALBUMIN HUMAN 25 %
50 VIAL (ML) INTRAVENOUS ONCE
Qty: 0 | Refills: 0 | Status: COMPLETED | OUTPATIENT
Start: 2018-07-18 | End: 2018-07-19

## 2018-07-18 RX ORDER — SODIUM CHLORIDE 9 MG/ML
500 INJECTION INTRAMUSCULAR; INTRAVENOUS; SUBCUTANEOUS ONCE
Qty: 0 | Refills: 0 | Status: COMPLETED | OUTPATIENT
Start: 2018-07-18 | End: 2018-07-18

## 2018-07-18 RX ORDER — ACETAMINOPHEN 500 MG
1000 TABLET ORAL ONCE
Qty: 0 | Refills: 0 | Status: COMPLETED | OUTPATIENT
Start: 2018-07-18 | End: 2018-07-18

## 2018-07-18 RX ORDER — INSULIN LISPRO 100/ML
VIAL (ML) SUBCUTANEOUS EVERY 4 HOURS
Qty: 0 | Refills: 0 | Status: DISCONTINUED | OUTPATIENT
Start: 2018-07-18 | End: 2018-07-19

## 2018-07-18 RX ORDER — SODIUM CHLORIDE 9 MG/ML
250 INJECTION INTRAMUSCULAR; INTRAVENOUS; SUBCUTANEOUS ONCE
Qty: 0 | Refills: 0 | Status: COMPLETED | OUTPATIENT
Start: 2018-07-18 | End: 2018-07-18

## 2018-07-18 RX ORDER — TACROLIMUS 5 MG/1
3 CAPSULE ORAL EVERY 12 HOURS
Qty: 0 | Refills: 0 | Status: DISCONTINUED | OUTPATIENT
Start: 2018-07-18 | End: 2018-07-22

## 2018-07-18 RX ORDER — OXYCODONE AND ACETAMINOPHEN 5; 325 MG/1; MG/1
1 TABLET ORAL EVERY 4 HOURS
Qty: 0 | Refills: 0 | Status: DISCONTINUED | OUTPATIENT
Start: 2018-07-18 | End: 2018-07-18

## 2018-07-18 RX ORDER — VALGANCICLOVIR 450 MG/1
450 TABLET, FILM COATED ORAL DAILY
Qty: 0 | Refills: 0 | Status: DISCONTINUED | OUTPATIENT
Start: 2018-07-18 | End: 2018-07-18

## 2018-07-18 RX ORDER — OXYCODONE AND ACETAMINOPHEN 5; 325 MG/1; MG/1
2 TABLET ORAL EVERY 4 HOURS
Qty: 0 | Refills: 0 | Status: DISCONTINUED | OUTPATIENT
Start: 2018-07-18 | End: 2018-07-18

## 2018-07-18 RX ORDER — NOREPINEPHRINE BITARTRATE/D5W 8 MG/250ML
0.05 PLASTIC BAG, INJECTION (ML) INTRAVENOUS
Qty: 8 | Refills: 0 | Status: DISCONTINUED | OUTPATIENT
Start: 2018-07-18 | End: 2018-07-18

## 2018-07-18 RX ORDER — BASILIXIMAB 20 MG/5ML
20 INJECTION, POWDER, FOR SOLUTION INTRAVENOUS ONCE
Qty: 0 | Refills: 0 | Status: COMPLETED | OUTPATIENT
Start: 2018-07-21 | End: 2018-07-21

## 2018-07-18 RX ORDER — MYCOPHENOLATE MOFETIL 250 MG/1
1000 CAPSULE ORAL
Qty: 0 | Refills: 0 | Status: DISCONTINUED | OUTPATIENT
Start: 2018-07-18 | End: 2018-07-25

## 2018-07-18 RX ORDER — MYCOPHENOLATE MOFETIL 250 MG/1
1 CAPSULE ORAL EVERY 12 HOURS
Qty: 0 | Refills: 0 | Status: COMPLETED | OUTPATIENT
Start: 2018-07-18 | End: 2018-07-18

## 2018-07-18 RX ORDER — FAMOTIDINE 10 MG/ML
20 INJECTION INTRAVENOUS DAILY
Qty: 0 | Refills: 0 | Status: DISCONTINUED | OUTPATIENT
Start: 2018-07-19 | End: 2018-07-25

## 2018-07-18 RX ORDER — PHENYLEPHRINE HYDROCHLORIDE 10 MG/ML
0.04 INJECTION INTRAVENOUS
Qty: 40 | Refills: 0 | Status: DISCONTINUED | OUTPATIENT
Start: 2018-07-18 | End: 2018-07-18

## 2018-07-18 RX ORDER — FUROSEMIDE 40 MG
100 TABLET ORAL ONCE
Qty: 0 | Refills: 0 | Status: COMPLETED | OUTPATIENT
Start: 2018-07-18 | End: 2018-07-18

## 2018-07-18 RX ORDER — MYCOPHENOLATE MOFETIL 250 MG/1
1 CAPSULE ORAL EVERY 12 HOURS
Qty: 0 | Refills: 0 | Status: DISCONTINUED | OUTPATIENT
Start: 2018-07-18 | End: 2018-07-18

## 2018-07-18 RX ORDER — SENNA PLUS 8.6 MG/1
2 TABLET ORAL AT BEDTIME
Qty: 0 | Refills: 0 | Status: DISCONTINUED | OUTPATIENT
Start: 2018-07-18 | End: 2018-07-25

## 2018-07-18 RX ORDER — MYCOPHENOLATE MOFETIL 250 MG/1
1000 CAPSULE ORAL ONCE
Qty: 0 | Refills: 0 | Status: COMPLETED | OUTPATIENT
Start: 2018-07-18 | End: 2018-07-18

## 2018-07-18 RX ORDER — DEXTROSE 50 % IN WATER 50 %
12.5 SYRINGE (ML) INTRAVENOUS ONCE
Qty: 0 | Refills: 0 | Status: DISCONTINUED | OUTPATIENT
Start: 2018-07-18 | End: 2018-07-18

## 2018-07-18 RX ORDER — GLUCAGON INJECTION, SOLUTION 0.5 MG/.1ML
1 INJECTION, SOLUTION SUBCUTANEOUS ONCE
Qty: 0 | Refills: 0 | Status: DISCONTINUED | OUTPATIENT
Start: 2018-07-18 | End: 2018-07-18

## 2018-07-18 RX ORDER — SODIUM CHLORIDE 9 MG/ML
1000 INJECTION INTRAMUSCULAR; INTRAVENOUS; SUBCUTANEOUS ONCE
Qty: 0 | Refills: 0 | Status: COMPLETED | OUTPATIENT
Start: 2018-07-18 | End: 2018-07-18

## 2018-07-18 RX ORDER — ACETAMINOPHEN 500 MG
325 TABLET ORAL EVERY 4 HOURS
Qty: 0 | Refills: 0 | Status: DISCONTINUED | OUTPATIENT
Start: 2018-07-18 | End: 2018-07-25

## 2018-07-18 RX ORDER — SODIUM CHLORIDE 9 MG/ML
1000 INJECTION INTRAMUSCULAR; INTRAVENOUS; SUBCUTANEOUS
Qty: 0 | Refills: 0 | Status: DISCONTINUED | OUTPATIENT
Start: 2018-07-18 | End: 2018-07-20

## 2018-07-18 RX ORDER — DOCUSATE SODIUM 100 MG
100 CAPSULE ORAL
Qty: 0 | Refills: 0 | Status: DISCONTINUED | OUTPATIENT
Start: 2018-07-18 | End: 2018-07-25

## 2018-07-18 RX ORDER — DEXTROSE 50 % IN WATER 50 %
25 SYRINGE (ML) INTRAVENOUS ONCE
Qty: 0 | Refills: 0 | Status: DISCONTINUED | OUTPATIENT
Start: 2018-07-18 | End: 2018-07-18

## 2018-07-18 RX ORDER — DOPAMINE HYDROCHLORIDE 40 MG/ML
5 INJECTION, SOLUTION, CONCENTRATE INTRAVENOUS
Qty: 400 | Refills: 0 | Status: DISCONTINUED | OUTPATIENT
Start: 2018-07-18 | End: 2018-07-18

## 2018-07-18 RX ORDER — DEXTROSE 50 % IN WATER 50 %
15 SYRINGE (ML) INTRAVENOUS ONCE
Qty: 0 | Refills: 0 | Status: DISCONTINUED | OUTPATIENT
Start: 2018-07-18 | End: 2018-07-18

## 2018-07-18 RX ORDER — MAGNESIUM SULFATE 500 MG/ML
2 VIAL (ML) INJECTION
Qty: 0 | Refills: 0 | Status: COMPLETED | OUTPATIENT
Start: 2018-07-18 | End: 2018-07-18

## 2018-07-18 RX ORDER — ACETAMINOPHEN 500 MG
650 TABLET ORAL EVERY 6 HOURS
Qty: 0 | Refills: 0 | Status: DISCONTINUED | OUTPATIENT
Start: 2018-07-18 | End: 2018-07-18

## 2018-07-18 RX ORDER — NYSTATIN 500MM UNIT
500000 POWDER (EA) MISCELLANEOUS
Qty: 0 | Refills: 0 | Status: DISCONTINUED | OUTPATIENT
Start: 2018-07-18 | End: 2018-07-25

## 2018-07-18 RX ORDER — HYDROMORPHONE HYDROCHLORIDE 2 MG/ML
0.5 INJECTION INTRAMUSCULAR; INTRAVENOUS; SUBCUTANEOUS ONCE
Qty: 0 | Refills: 0 | Status: DISCONTINUED | OUTPATIENT
Start: 2018-07-18 | End: 2018-07-18

## 2018-07-18 RX ORDER — OXYCODONE HYDROCHLORIDE 5 MG/1
5 TABLET ORAL
Qty: 0 | Refills: 0 | Status: DISCONTINUED | OUTPATIENT
Start: 2018-07-18 | End: 2018-07-19

## 2018-07-18 RX ADMIN — TACROLIMUS 3 MILLIGRAM(S): 5 CAPSULE ORAL at 17:08

## 2018-07-18 RX ADMIN — MYCOPHENOLATE MOFETIL 1000 MILLIGRAM(S): 250 CAPSULE ORAL at 17:08

## 2018-07-18 RX ADMIN — Medication 1 TABLET(S): at 12:16

## 2018-07-18 RX ADMIN — Medication 4: at 10:32

## 2018-07-18 RX ADMIN — SODIUM CHLORIDE 1000 MILLILITER(S): 9 INJECTION INTRAMUSCULAR; INTRAVENOUS; SUBCUTANEOUS at 05:00

## 2018-07-18 RX ADMIN — Medication 500000 UNIT(S): at 05:29

## 2018-07-18 RX ADMIN — Medication 100 MILLIGRAM(S): at 05:28

## 2018-07-18 RX ADMIN — Medication 100 MILLIGRAM(S): at 14:55

## 2018-07-18 RX ADMIN — Medication 4: at 14:45

## 2018-07-18 RX ADMIN — OXYCODONE AND ACETAMINOPHEN 1 TABLET(S): 5; 325 TABLET ORAL at 10:25

## 2018-07-18 RX ADMIN — SODIUM CHLORIDE 2000 MILLILITER(S): 9 INJECTION INTRAMUSCULAR; INTRAVENOUS; SUBCUTANEOUS at 14:54

## 2018-07-18 RX ADMIN — Medication 50 GRAM(S): at 09:43

## 2018-07-18 RX ADMIN — Medication 325 MILLIGRAM(S): at 20:56

## 2018-07-18 RX ADMIN — Medication 400 MILLIGRAM(S): at 05:29

## 2018-07-18 RX ADMIN — Medication 1 TABLET(S): at 05:28

## 2018-07-18 RX ADMIN — VALGANCICLOVIR 900 MILLIGRAM(S): 450 TABLET, FILM COATED ORAL at 12:16

## 2018-07-18 RX ADMIN — HYDROMORPHONE HYDROCHLORIDE 0.5 MILLIGRAM(S): 2 INJECTION INTRAMUSCULAR; INTRAVENOUS; SUBCUTANEOUS at 02:57

## 2018-07-18 RX ADMIN — Medication 125 MILLIGRAM(S): at 17:08

## 2018-07-18 RX ADMIN — Medication 50 GRAM(S): at 11:10

## 2018-07-18 RX ADMIN — OXYCODONE AND ACETAMINOPHEN 1 TABLET(S): 5; 325 TABLET ORAL at 09:52

## 2018-07-18 RX ADMIN — SODIUM CHLORIDE 3000 MILLILITER(S): 9 INJECTION INTRAMUSCULAR; INTRAVENOUS; SUBCUTANEOUS at 05:30

## 2018-07-18 RX ADMIN — OXYCODONE AND ACETAMINOPHEN 1 TABLET(S): 5; 325 TABLET ORAL at 15:30

## 2018-07-18 RX ADMIN — HYDROMORPHONE HYDROCHLORIDE 0.5 MILLIGRAM(S): 2 INJECTION INTRAMUSCULAR; INTRAVENOUS; SUBCUTANEOUS at 03:12

## 2018-07-18 RX ADMIN — MYCOPHENOLATE MOFETIL 1000 MILLIGRAM(S): 250 CAPSULE ORAL at 09:43

## 2018-07-18 RX ADMIN — OXYCODONE AND ACETAMINOPHEN 1 TABLET(S): 5; 325 TABLET ORAL at 14:35

## 2018-07-18 RX ADMIN — SODIUM CHLORIDE 500 MILLILITER(S): 9 INJECTION INTRAMUSCULAR; INTRAVENOUS; SUBCUTANEOUS at 02:49

## 2018-07-18 RX ADMIN — OXYCODONE HYDROCHLORIDE 5 MILLIGRAM(S): 5 TABLET ORAL at 22:36

## 2018-07-18 RX ADMIN — MYCOPHENOLATE MOFETIL 1 GRAM(S): 250 CAPSULE ORAL at 05:29

## 2018-07-18 RX ADMIN — Medication 500000 UNIT(S): at 12:16

## 2018-07-18 RX ADMIN — Medication 1000 MILLIGRAM(S): at 05:59

## 2018-07-18 RX ADMIN — Medication 125 MILLIGRAM(S): at 05:29

## 2018-07-18 RX ADMIN — SODIUM CHLORIDE 3000 MILLILITER(S): 9 INJECTION INTRAMUSCULAR; INTRAVENOUS; SUBCUTANEOUS at 05:00

## 2018-07-18 RX ADMIN — Medication 100 MILLIGRAM(S): at 17:08

## 2018-07-18 RX ADMIN — Medication 2: at 18:14

## 2018-07-18 RX ADMIN — Medication 500000 UNIT(S): at 17:08

## 2018-07-18 RX ADMIN — TACROLIMUS 3 MILLIGRAM(S): 5 CAPSULE ORAL at 05:29

## 2018-07-18 NOTE — CONSULT NOTE ADULT - ATTENDING COMMENTS
seen and examined upon arrival in SICU    s/p DDRT  now with pre-renal CHELA  -IVF resuscitation    hypovolemic shock, with lactic acidosis, on vasopressors  -CVP = 0-1, which is quite low in a hemodialysis patient  -IVF resuscitation until oliguria and metabolic acidosis resolve  -SVV = 7%, but this is probably not an accurate measure of volume status because of autonomic dysfunction caused by ESRD/HD      Critical Care Time - 60 minutes
I was present during and reviewed clinical and lab data as well as assessment and plan as documented by the house staff as noted. Please contact if any additional questions with any change in clinical condition or on availability of any additional information or reports.  Patient has had bilateral nephrectomies, last dialyzed on 7/16.  Reviewed pre transplant w/u, cardiac cath, donor info- 40 years old Hep C, donor from [place].  Plan:  Proceed with renal transplantation.  Case discussed with  transplant team

## 2018-07-18 NOTE — PROGRESS NOTE ADULT - SUBJECTIVE AND OBJECTIVE BOX
[facility] DIVISION OF KIDNEY DISEASES AND HYPERTENSION -- FOLLOW UP NOTE  --------------------------------------------------------------------------------    HPI: 67 year old male with a PMH of ESRD on HD (2015), DM, HTN, bilateral nephrectomy (2015) admitted for possible DDRT. Pt s/p DDRT (HEP C + DONOR) with CIT of 23 hours done on 7/17/18. Induction with Solumedrol and Simulect. Pt usually has HD MWF and follows with outpatient nephrologist Dr. Webb. Pt seen and examined in SICU. Pt transferred to SICU post-op due to low BP. Pt BP improved with some IV pressor support however he is now off IV pressor support. Pt denies CP, SOB or LE edema.      PAST HISTORY  --------------------------------------------------------------------------------  No significant changes to PMH, PSH, FHx, SHx, unless otherwise noted    ALLERGIES & MEDICATIONS  --------------------------------------------------------------------------------  Allergies    No Known Allergies    Intolerances      Standing Inpatient Medications  docusate sodium 100 milliGRAM(s) Oral two times a day  insulin lispro (HumaLOG) corrective regimen sliding scale   SubCutaneous every 4 hours  methylPREDNISolone sodium succinate Injectable 125 milliGRAM(s) IV Push every 12 hours  methylPREDNISolone sodium succinate Injectable   IV Push   mycophenolate mofetil 1000 milliGRAM(s) Oral two times a day  nystatin    Suspension 329781 Unit(s) Swish and Swallow four times a day  senna 2 Tablet(s) Oral at bedtime  sodium chloride 0.9%. 1000 milliLiter(s) IV Continuous <Continuous>  tacrolimus 3 milliGRAM(s) Oral every 12 hours  trimethoprim   80 mG/sulfamethoxazole 400 mG 1 Tablet(s) Oral daily  valGANciclovir 900 milliGRAM(s) Oral daily    PRN Inpatient Medications  oxyCODONE    5 mG/acetaminophen 325 mG 1 Tablet(s) Oral every 4 hours PRN  oxyCODONE    5 mG/acetaminophen 325 mG 2 Tablet(s) Oral every 4 hours PRN      REVIEW OF SYSTEMS  --------------------------------------------------------------------------------  Gen: No weakness  Skin: No rashes  Head/Eyes/Ears/Mouth: No headache  Respiratory: No dyspnea  CV: No chest pain, PND, orthopnea  GI: No abdominal pain  : No increased frequency  MSK: No edema  Neuro: No dizziness/lightheadedness  Heme: No easy bruising or bleeding    All other systems were reviewed and are negative, except as noted.    VITALS/PHYSICAL EXAM  --------------------------------------------------------------------------------  T(C): 36.2 (07-18-18 @ 11:00), Max: 36.9 (07-17-18 @ 14:15)  HR: 79 (07-18-18 @ 13:00) (72 - 132)  BP: 139/82 (07-18-18 @ 10:00) (116/71 - 139/82)  RR: 16 (07-18-18 @ 13:00) (13 - 26)  SpO2: 99% (07-18-18 @ 13:00) (88% - 100%)  Wt(kg): --  Height (cm): 177.8 (07-17-18 @ 14:15)  Weight (kg): 128 (07-17-18 @ 14:15)  BMI (kg/m2): 40.5 (07-17-18 @ 14:15)  BSA (m2): 2.42 (07-17-18 @ 14:15)      07-17-18 @ 07:01  -  07-18-18 @ 07:00  --------------------------------------------------------  IN: 2734.2 mL / OUT: 340 mL / NET: 2394.2 mL    07-18-18 @ 07:01  -  07-18-18 @ 14:06  --------------------------------------------------------  IN: 380 mL / OUT: 107 mL / NET: 273 mL    Physical Exam:  	Gen: NAD  	Pulm: CTA B/L  	CV: RRR, S1S2; no rub  	Abd: +BS, soft, nontender/nondistended + midline healed scar              Transplant: RLQ incision, no bleeding   	UE: Warm, no asterixis  	LE: Warm, no edema  	Psych: Normal affect and mood  	Skin: Warm, without rashes  	Vascular access:  L AVF     LABS/STUDIES  --------------------------------------------------------------------------------              10.3   13.5  >-----------<  136      [07-18-18 @ 08:19]              30.6     140  |  104  |  37  ----------------------------<  244      [07-18-18 @ 13:15]  5.2   |  16  |  10.75        Ca     7.9     [07-18-18 @ 13:15]      Mg     1.4     [07-18-18 @ 08:19]      Phos  5.8     [07-18-18 @ 08:19]    TPro  6.1  /  Alb  3.5  /  TBili  0.2  /  DBili  x   /  AST  23  /  ALT  14  /  AlkPhos  65  [07-18-18 @ 08:19]    PT/INR: PT 11.6 , INR 1.07       [07-18-18 @ 05:56]  PTT: 27.4       [07-18-18 @ 05:56]      Creatinine Trend:  SCr 10.75 [07-18 @ 13:15]  SCr 10.53 [07-18 @ 08:19]  SCr 10.71 [07-18 @ 03:34]  SCr 9.53 [07-17 @ 10:21]      Urine Creatinine 12      [07-18-18 @ 09:30]  Urine Sodium 143      [07-18-18 @ 09:30]  Urine Osmolality 306      [07-18-18 @ 09:30]    HbA1c 6.0      [07-17-18 @ 16:53]

## 2018-07-18 NOTE — BRIEF OPERATIVE NOTE - OPERATION/FINDINGS
donor renal transplant transplanted to R side, anastomosed to R external iliac artery and vein.  Ureter connected to bladder over double J stent. Small drops of urine noted at end of case  ALEXANDR left at end of case. R DP pulse palpable at end of case  Induction with Solumedrol and Simulect  Cold ischemia time 23 hours  Hep C positive kidney

## 2018-07-18 NOTE — CONSULT NOTE ADULT - SUBJECTIVE AND OBJECTIVE BOX
HISTORY OF PRESENT ILLNESS:  JUAN CARLOS COPELAND is a 67 year old male with a PMH of ESRD on HD (2015), DM, HTN, bilateral nephrectomy (2015) admitted for possible DDRT. Pt last HD was on Monday prior to presentation. Pt usually has HD MWF and follows with outpatient nephrologist Dr. Webb. Pt seen and examined. Pt denies CP, SOB or LE edema.     Patient underwent DDRT but had to be given vasopressor pushes during the operation. While no vasopressor drip was started, the vasopressors were dosed and redosed several times to avoid hypotension (reported intraoperative systolic blood pressure in 70s) per anesthesia and primary team.    PAST MEDICAL HISTORY: ESRD (end stage renal disease) on dialysis  Kidney cancer, primary, with metastasis from kidney to other site  Bladder cancer  CKD (chronic kidney disease)  HTN (hypertension)  DM (diabetes mellitus)      PAST SURGICAL HISTORY:   S/p nephrectomy: right 12/10/2015  S/p nephrectomy: left 9/15/2015  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    FAMILY HISTORY: No pertinent family history in first degree relatives      CODE STATUS: Full code    HOME MEDICATIONS: to be reviewed in the morning    ALLERGIES: No Known Allergies      VITAL SIGNS:  ICU Vital Signs Last 24 Hrs  T(C): 36.8 (17 Jul 2018 18:25), Max: 37.3 (17 Jul 2018 08:10)  T(F): 98.2 (17 Jul 2018 18:25), Max: 99.1 (17 Jul 2018 08:10)  HR: 72 (17 Jul 2018 18:25) (72 - 95)  BP: 122/60 (17 Jul 2018 18:25) (109/65 - 126/67)  BP(mean): --  ABP: --  ABP(mean): --  RR: 16 (17 Jul 2018 18:25) (16 - 18)  SpO2: 99% (17 Jul 2018 18:25) (98% - 99%)      NEURO  Exam:      RESPIRATORY  Mechanical Ventilation:   ABG - ( 18 Jul 2018 00:40 )  pH: 7.31  /  pCO2: 43    /  pO2: 245   / HCO3: 21    / Base Excess: -4.0  /  SaO2: 99      Lactate: x                Exam:      CARDIOVASCULAR    Exam:  Cardiac Rhythm:      GI/NUTRITION  Exam:  Diet:      GENITOURINARY/RENAL  sodium chloride 0.9% Bolus 250 milliLiter(s) IV Bolus once      Weight (kg): 128 (07-17 @ 14:15)  07-17    140  |  98  |  23  ----------------------------<  125<H>  4.1   |  23  |  9.53<H>    Ca    9.3      17 Jul 2018 10:21  Phos  3.9     07-17  Mg     1.6     07-17    TPro  7.2  /  Alb  4.0  /  TBili  0.2  /  DBili  x   /  AST  19  /  ALT  15  /  AlkPhos  90  07-17    [ ] Velez catheter, indication: urine output monitoring in critically ill patient    HEMATOLOGIC  [ ] VTE Prophylaxis:                          11.0   7.1   )-----------( 142      ( 17 Jul 2018 13:38 )             33.0     PT/INR - ( 17 Jul 2018 13:38 )   PT: 10.8 sec;   INR: 1.00 ratio         PTT - ( 17 Jul 2018 13:38 )  PTT:29.7 sec  Transfusion: [ ] PRBC	[ ] Platelets	[ ] FFP	[ ] Cryoprecipitate      INFECTIOUS DISEASES    RECENT CULTURES:      ENDOCRINE    CAPILLARY BLOOD GLUCOSE      POCT Blood Glucose.: 95 mg/dL (17 Jul 2018 18:05)  POCT Blood Glucose.: 100 mg/dL (17 Jul 2018 12:23)  POCT Blood Glucose.: 130 mg/dL (17 Jul 2018 09:54)      PATIENT CARE ACCESS DEVICES:  [ ] Peripheral IV  [ ] Central Venous Line	[ ] R	[ ] L	[ ] IJ	[ ] Fem	[ ] SC	Placed:   [ ] Arterial Line		[ ] R	[ ] L	[ ] Fem	[ ] Rad	[ ] Ax	Placed:   [ ] PICC:					[ ] Mediport  [ ] Urinary Catheter, Date Placed:   [x] Necessity of urinary, arterial, and venous catheters discussed    OTHER MEDICATIONS:     IMAGING STUDIES: HISTORY OF PRESENT ILLNESS:  JUAN CARLOS COPELAND is a 67 year old male with a PMH of ESRD on HD (2015), DM, HTN, bilateral nephrectomy (2015) admitted for possible DDRT. Pt last HD was on Monday prior to presentation. Pt usually has HD MWF and follows with outpatient nephrologist Dr. Webb. Pt seen and examined. Pt denies CP, SOB or LE edema.     Patient underwent DDRT but had to be given vasopressor pushes during the operation. While no vasopressor drip was started, the vasopressors were dosed and redosed several times to avoid hypotension (reported intraoperative systolic blood pressure in 70s) per anesthesia and primary team.    PAST MEDICAL HISTORY: ESRD (end stage renal disease) on dialysis  Kidney cancer, primary, with metastasis from kidney to other site  Bladder cancer  CKD (chronic kidney disease)  HTN (hypertension)  DM (diabetes mellitus)      PAST SURGICAL HISTORY:   S/p nephrectomy: right 12/10/2015  S/p nephrectomy: left 9/15/2015  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    FAMILY HISTORY: No pertinent family history in first degree relatives      CODE STATUS: Full code    HOME MEDICATIONS: to be reviewed in the morning    ALLERGIES: No Known Allergies      VITAL SIGNS:  ICU Vital Signs Last 24 Hrs  T(C): 36.8 (17 Jul 2018 18:25), Max: 37.3 (17 Jul 2018 08:10)  T(F): 98.2 (17 Jul 2018 18:25), Max: 99.1 (17 Jul 2018 08:10)  HR: 72 (17 Jul 2018 18:25) (72 - 95)  BP: 122/60 (17 Jul 2018 18:25) (109/65 - 126/67)  BP(mean): --  ABP: --  ABP(mean): --  RR: 16 (17 Jul 2018 18:25) (16 - 18)  SpO2: 99% (17 Jul 2018 18:25) (98% - 99%)      NEURO  Exam: alert, oriented      RESPIRATORY  Mechanical Ventilation:   ABG - ( 18 Jul 2018 00:40 )  pH: 7.31  /  pCO2: 43    /  pO2: 245   / HCO3: 21    / Base Excess: -4.0  /  SaO2: 99      Lactate: x          Exam: no increased wob    CARDIOVASCULAR    Exam:   Cardiac Rhythm:      GI/NUTRITION  Exam:  Diet:      GENITOURINARY/RENAL  sodium chloride 0.9% Bolus 250 milliLiter(s) IV Bolus once      Weight (kg): 128 (07-17 @ 14:15)  07-17    140  |  98  |  23  ----------------------------<  125<H>  4.1   |  23  |  9.53<H>    Ca    9.3      17 Jul 2018 10:21  Phos  3.9     07-17  Mg     1.6     07-17    TPro  7.2  /  Alb  4.0  /  TBili  0.2  /  DBili  x   /  AST  19  /  ALT  15  /  AlkPhos  90  07-17    [ ] Velez catheter, indication: urine output monitoring in critically ill patient    HEMATOLOGIC  [ ] VTE Prophylaxis:                          11.0   7.1   )-----------( 142      ( 17 Jul 2018 13:38 )             33.0     PT/INR - ( 17 Jul 2018 13:38 )   PT: 10.8 sec;   INR: 1.00 ratio         PTT - ( 17 Jul 2018 13:38 )  PTT:29.7 sec  Transfusion: [ ] PRBC	[ ] Platelets	[ ] FFP	[ ] Cryoprecipitate      INFECTIOUS DISEASES    RECENT CULTURES:      ENDOCRINE    CAPILLARY BLOOD GLUCOSE      POCT Blood Glucose.: 95 mg/dL (17 Jul 2018 18:05)  POCT Blood Glucose.: 100 mg/dL (17 Jul 2018 12:23)  POCT Blood Glucose.: 130 mg/dL (17 Jul 2018 09:54)      PATIENT CARE ACCESS DEVICES:  [ ] Peripheral IV  [ ] Central Venous Line	[ ] R	[ ] L	[ ] IJ	[ ] Fem	[ ] SC	Placed:   [ ] Arterial Line		[ ] R	[ ] L	[ ] Fem	[ ] Rad	[ ] Ax	Placed:   [ ] PICC:					[ ] Mediport  [ ] Urinary Catheter, Date Placed:   [x] Necessity of urinary, arterial, and venous catheters discussed    OTHER MEDICATIONS:     IMAGING STUDIES: HISTORY OF PRESENT ILLNESS:  JUAN CARLOS COPELAND is a 67 year old male with a PMH of ESRD on HD (2015), DM, HTN, bilateral nephrectomy (2015) admitted for possible DDRT. Pt last HD was on Monday prior to presentation. Pt usually has HD MWF and follows with outpatient nephrologist Dr. Webb. Pt seen and examined. Pt denies CP, SOB or LE edema.     Patient underwent DDRT but had to be given vasopressor pushes during the operation. While no vasopressor drip was started, the vasopressors were dosed and redosed several times to avoid hypotension (reported intraoperative systolic blood pressure in 70s) per anesthesia and primary team.    PAST MEDICAL HISTORY: ESRD (end stage renal disease) on dialysis  Kidney cancer, primary, with metastasis from kidney to other site  Bladder cancer  CKD (chronic kidney disease)  HTN (hypertension)  DM (diabetes mellitus)      PAST SURGICAL HISTORY:   S/p nephrectomy: right 12/10/2015  S/p nephrectomy: left 9/15/2015  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    FAMILY HISTORY: No pertinent family history in first degree relatives      CODE STATUS: Full code    HOME MEDICATIONS: to be reviewed in the morning    ALLERGIES: No Known Allergies      VITAL SIGNS:  ICU Vital Signs Last 24 Hrs  T(C): 36.8 (17 Jul 2018 18:25), Max: 37.3 (17 Jul 2018 08:10)  T(F): 98.2 (17 Jul 2018 18:25), Max: 99.1 (17 Jul 2018 08:10)  HR: 72 (17 Jul 2018 18:25) (72 - 95)  BP: 122/60 (17 Jul 2018 18:25) (109/65 - 126/67)  BP(mean): --  ABP: --  ABP(mean): --  RR: 16 (17 Jul 2018 18:25) (16 - 18)  SpO2: 99% (17 Jul 2018 18:25) (98% - 99%)      NEURO  Exam: alert, oriented      RESPIRATORY  Mechanical Ventilation:   ABG - ( 18 Jul 2018 00:40 )  pH: 7.31  /  pCO2: 43    /  pO2: 245   / HCO3: 21    / Base Excess: -4.0  /  SaO2: 99      Lactate: x          Exam: no increased wob, CTAB (slightly decreased breath sounds on the right)    CARDIOVASCULAR    Exam: rrr, no m/r/g  Cardiac Rhythm:      GI/NUTRITION  Exam: soft, appropriately tender, ND  Diet: NPO      GENITOURINARY/RENAL  sodium chloride 0.9% Bolus 250 milliLiter(s) IV Bolus once      Weight (kg): 128 (07-17 @ 14:15)  07-17    140  |  98  |  23  ----------------------------<  125<H>  4.1   |  23  |  9.53<H>    Ca    9.3      17 Jul 2018 10:21  Phos  3.9     07-17  Mg     1.6     07-17    TPro  7.2  /  Alb  4.0  /  TBili  0.2  /  DBili  x   /  AST  19  /  ALT  15  /  AlkPhos  90  07-17    [x] Velez catheter, indication: urine output monitoring in critically ill patient    HEMATOLOGIC  [ ] VTE Prophylaxis: On hold for now, will re-evaluate need in the morning                          11.0   7.1   )-----------( 142      ( 17 Jul 2018 13:38 )             33.0     PT/INR - ( 17 Jul 2018 13:38 )   PT: 10.8 sec;   INR: 1.00 ratio         PTT - ( 17 Jul 2018 13:38 )  PTT:29.7 sec  Transfusion: [ ] PRBC	[ ] Platelets	[ ] FFP	[ ] Cryoprecipitate      INFECTIOUS DISEASES    RECENT CULTURES:      ENDOCRINE    CAPILLARY BLOOD GLUCOSE      POCT Blood Glucose.: 95 mg/dL (17 Jul 2018 18:05)  POCT Blood Glucose.: 100 mg/dL (17 Jul 2018 12:23)  POCT Blood Glucose.: 130 mg/dL (17 Jul 2018 09:54)      PATIENT CARE ACCESS DEVICES:  [x] Peripheral IV  [ ] Central Venous Line	[ ] R	[ ] L	[ ] IJ	[ ] Fem	[ ] SC	Placed:   [ ] Arterial Line		[ ] R	[ ] L	[ ] Fem	[ ] Rad	[ ] Ax	Placed:   [ ] PICC:					[ ] Mediport  [ ] Urinary Catheter, Date Placed:   [x] Necessity of urinary, arterial, and venous catheters discussed    OTHER MEDICATIONS:     IMAGING STUDIES:    Renal US: pending HISTORY OF PRESENT ILLNESS:  JUAN CARLOS COPELAND is a 67 year old male with a PMH of ESRD on HD (2015), DM, HTN, bilateral nephrectomy (2015) admitted for possible DDRT. Pt last HD was on Monday prior to presentation. Pt usually has HD MWF and follows with outpatient nephrologist Dr. Webb. Pt seen and examined. Pt denies CP, SOB or LE edema.     Patient underwent DDRT but had to be given vasopressor pushes during the operation. While no vasopressor drip was started, the vasopressors were dosed and redosed several times to avoid hypotension (reported intraoperative systolic blood pressure in 70s) per anesthesia and primary team.    PAST MEDICAL HISTORY: ESRD (end stage renal disease) on dialysis  Kidney cancer, primary, with metastasis from kidney to other site  Bladder cancer  CKD (chronic kidney disease)  HTN (hypertension)  DM (diabetes mellitus)      PAST SURGICAL HISTORY:   S/p nephrectomy: right 12/10/2015  S/p nephrectomy: left 9/15/2015  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    FAMILY HISTORY: No pertinent family history in first degree relatives      CODE STATUS: Full code    HOME MEDICATIONS: to be reviewed in the morning    ALLERGIES: No Known Allergies      VITAL SIGNS:  ICU Vital Signs Last 24 Hrs  T(C): 36.8 (17 Jul 2018 18:25), Max: 37.3 (17 Jul 2018 08:10)  T(F): 98.2 (17 Jul 2018 18:25), Max: 99.1 (17 Jul 2018 08:10)  HR: 72 (17 Jul 2018 18:25) (72 - 95)  BP: 122/60 (17 Jul 2018 18:25) (109/65 - 126/67)  BP(mean): --  ABP: --  ABP(mean): --  RR: 16 (17 Jul 2018 18:25) (16 - 18)  SpO2: 99% (17 Jul 2018 18:25) (98% - 99%)      NEURO  Exam: alert, oriented      RESPIRATORY  Mechanical Ventilation:   ABG - ( 18 Jul 2018 00:40 )  pH: 7.31  /  pCO2: 43    /  pO2: 245   / HCO3: 21    / Base Excess: -4.0  /  SaO2: 99      Lactate: x          Exam: no increased wob, CTAB (slightly decreased breath sounds on the right)    CARDIOVASCULAR    Exam: rrr, no m/r/g  Cardiac Rhythm:      GI/NUTRITION  Exam: soft, appropriately tender, ND  Diet: NPO      GENITOURINARY/RENAL  sodium chloride 0.9% Bolus 250 milliLiter(s) IV Bolus once      Weight (kg): 128 (07-17 @ 14:15)  07-17    140  |  98  |  23  ----------------------------<  125<H>  4.1   |  23  |  9.53<H>    Ca    9.3      17 Jul 2018 10:21  Phos  3.9     07-17  Mg     1.6     07-17    TPro  7.2  /  Alb  4.0  /  TBili  0.2  /  DBili  x   /  AST  19  /  ALT  15  /  AlkPhos  90  07-17     [x] Velez catheter, indication: urine output monitoring in critically ill patient    HEMATOLOGIC  [ ] VTE Prophylaxis: On hold for now, will re-evaluate need in the morning                          11.0   7.1   )-----------( 142      ( 17 Jul 2018 13:38 )             33.0     PT/INR - ( 17 Jul 2018 13:38 )   PT: 10.8 sec;   INR: 1.00 ratio         PTT - ( 17 Jul 2018 13:38 )  PTT:29.7 sec  Transfusion: [ ] PRBC	[ ] Platelets	[ ] FFP	[ ] Cryoprecipitate      INFECTIOUS DISEASES    RECENT CULTURES:      ENDOCRINE    CAPILLARY BLOOD GLUCOSE      POCT Blood Glucose.: 95 mg/dL (17 Jul 2018 18:05)  POCT Blood Glucose.: 100 mg/dL (17 Jul 2018 12:23)  POCT Blood Glucose.: 130 mg/dL (17 Jul 2018 09:54)      PATIENT CARE ACCESS DEVICES:  [x] Peripheral IV  [ ] Central Venous Line	[ ] R	[ ] L	[ ] IJ	[ ] Fem	[ ] SC	Placed:   [ ] Arterial Line		[ ] R	[ ] L	[ ] Fem	[ ] Rad	[ ] Ax	Placed:   [ ] PICC:					[ ] Mediport  [ ] Urinary Catheter, Date Placed:   [x] Necessity of urinary, arterial, and venous catheters discussed    OTHER MEDICATIONS:     IMAGING STUDIES:    Renal US: pending

## 2018-07-18 NOTE — BRIEF OPERATIVE NOTE - PROCEDURE
<<-----Click on this checkbox to enter Procedure Preparation, donor kidney, cadaveric  2018    [facility]  [facility],  don[facility], adult recipient  2018    [facility]

## 2018-07-18 NOTE — PROGRESS NOTE ADULT - SUBJECTIVE AND OBJECTIVE BOX
Post-Anesthetic Evaluation:    The Patient was interviewed and evaluated    Vital Signs Last 24 Hrs  T(C): 36.2 (18 Jul 2018 11:00), Max: 36.9 (17 Jul 2018 14:15)  T(F): 97.1 (18 Jul 2018 11:00), Max: 98.4 (17 Jul 2018 14:15)  HR: 79 (18 Jul 2018 13:00) (72 - 132)  BP: 139/82 (18 Jul 2018 10:00) (116/71 - 139/82)  BP(mean): 105 (18 Jul 2018 10:00) (84 - 105)  RR: 16 (18 Jul 2018 13:00) (13 - 26)  SpO2: 99% (18 Jul 2018 13:00) (88% - 100%)    Evaluation:      (X) No apparent complications or complaints regarding anesthesia care at this time  (X) All questions were answered    Condition:  (X) Stable      ( ) Guarded      ( ) Critical    Recommendations:  (X) None     ( ) Other:

## 2018-07-18 NOTE — PROGRESS NOTE ADULT - ATTENDING COMMENTS
Patient with DDRT, Hep C pos kidney, DGF, hypotension, resolved. Oliguric. Mild hyperkalemia.  Plan:  Immunosuppression reviewed  Hemodialysis  Has functioning AVF  No heparin  No fluid removal  Will follow

## 2018-07-18 NOTE — CONSULT NOTE ADULT - ASSESSMENT
ASSESSMENT: 67 year old male with a PMH of ESRD on HD (2015), DM, HTN, bilateral nephrectomy (2015) for renal carcinoma s/p DDRT.      PLAN:   Neurologic: Postop pain  Alert and oriented, continue pain control with PRN Dilaudid    Respiratory: No issues  Follow up AM CXR    Cardiovascular: postoperative hypotension requiring pressors; h/o HTN  Wean pressor requirements  F/u     Gastrointestinal/Nutrition:    Genitourinary/Renal:    Hematologic:    Infectious Disease:    Endocrine:    Disposition: ASSESSMENT: 67 year old male with a PMH of ESRD on HD (2015), DM, HTN, bilateral nephrectomy (2015) for renal carcinoma s/p DDRT.      PLAN:   Neurologic: Postop pain  Alert and oriented, continue pain control with PRN Dilaudid    Respiratory: No issues  Follow up AM CXR    Cardiovascular: postoperative hypotension requiring pressors; h/o HTN  Wean pressor requirements  F/u AM labs    Gastrointestinal/Nutrition: No issues  Diet: NPO, advance as recommended per primary team    Genitourinary/Renal: h/o renal cell carcinoma s/p bilat nephrectomies s/p DDRT  Continue NS @ 70 cc/hr    Hematologic:    Infectious Disease: On immunosuppression / anti-rejection medications  Immunosuppression per transplant team    Endocrine: DM  F/u fingerstick blood glucose levels  C/w sliding scale    Disposition: SICU ASSESSMENT: 67 year old male with a PMH of ESRD on HD (2015), DM, HTN, bilateral nephrectomy (2015) for renal carcinoma s/p DDRT.      PLAN:    Neurologic: Postop pain  Alert and oriented, continue pain control with PRN Dilaudid    Respiratory: No issues  Follow up AM CXR    Cardiovascular: postoperative hypotension requiring pressors; h/o HTN  Wean pressor requirements  F/u AM labs    Gastrointestinal/Nutrition: No issues  Diet: NPO, advance as recommended per primary team    Genitourinary/Renal: h/o renal cell carcinoma s/p bilat nephrectomies s/p DDRT  Continue NS @ 70 cc/hr    Hematologic:    Infectious Disease: On immunosuppression / anti-rejection medications  Immunosuppression per transplant team    Endocrine: DM  F/u fingerstick blood glucose levels  C/w sliding scale    Disposition: SICU

## 2018-07-18 NOTE — PROGRESS NOTE ADULT - ATTENDING COMMENTS
IMMUNOSUPPRESSION: Continue Tacrolimus 3mg BID I personally saw and examined patient.  IMMUNOSUPPRESSION: Continue Tacrolimus 3mg BID

## 2018-07-18 NOTE — PROGRESS NOTE ADULT - SUBJECTIVE AND OBJECTIVE BOX
INTERVAL HPI/OVERNIGHT EVENTS:  Patient seen with multidisciplinary team (Nephrologist, pharmacist, nurse, nurse manager, NP, MD surgeons, transplant cordinator,  nutritionist, and  )  in am rounds and examined with Dr. Castaneda.  67 year old male with a PMH of ESRD on HD (2015), DM, HTN, bilateral nephrectomy (2015) now s/p DDRT. admitted for possible DDRT. Pt last HD was on Monday prior to presentation. Pt usually has HD MWF and follows with outpatient nephrologist Dr. Webb. Pt seen and examined. Pt denies CP, SOB or LE edema.        MEDICATIONS  (STANDING):  docusate sodium 100 milliGRAM(s) Oral two times a day  insulin lispro (HumaLOG) corrective regimen sliding scale   SubCutaneous every 4 hours  methylPREDNISolone sodium succinate Injectable 125 milliGRAM(s) IV Push every 12 hours  methylPREDNISolone sodium succinate Injectable   IV Push   mycophenolate mofetil 1000 milliGRAM(s) Oral two times a day  nystatin    Suspension 390231 Unit(s) Swish and Swallow four times a day  senna 2 Tablet(s) Oral at bedtime  sodium chloride 0.9%. 1000 milliLiter(s) (70 mL/Hr) IV Continuous <Continuous>  tacrolimus 3 milliGRAM(s) Oral every 12 hours  trimethoprim   80 mG/sulfamethoxazole 400 mG 1 Tablet(s) Oral daily  valGANciclovir 900 milliGRAM(s) Oral daily    MEDICATIONS  (PRN):  oxyCODONE    5 mG/acetaminophen 325 mG 1 Tablet(s) Oral every 4 hours PRN Moderate Pain (4 - 6)  oxyCODONE    5 mG/acetaminophen 325 mG 2 Tablet(s) Oral every 4 hours PRN Severe Pain (7 - 10)      Allergies    No Known Allergies    Intolerances        Vital Signs Last 24 Hrs  T(C): 36.6 (18 Jul 2018 07:00), Max: 36.9 (17 Jul 2018 13:35)  T(F): 97.8 (18 Jul 2018 07:00), Max: 98.4 (17 Jul 2018 13:35)  HR: 87 (18 Jul 2018 12:00) (72 - 132)  BP: 139/82 (18 Jul 2018 10:00) (116/71 - 139/82)  BP(mean): 105 (18 Jul 2018 10:00) (84 - 105)  RR: 14 (18 Jul 2018 12:00) (13 - 26)  SpO2: 96% (18 Jul 2018 12:00) (88% - 100%)    LABS:                        10.3   13.5  )-----------( 136      ( 18 Jul 2018 08:19 )             30.6     07-18    139  |  105  |  34<H>  ----------------------------<  249<H>  4.9   |  16<L>  |  10.53<H>    Ca    8.1<L>      18 Jul 2018 08:19  Phos  5.8     07-18  Mg     1.4     07-18    TPro  6.1  /  Alb  3.5  /  TBili  0.2  /  DBili  x   /  AST  23  /  ALT  14  /  AlkPhos  65  07-18    PT/INR - ( 18 Jul 2018 05:56 )   PT: 11.6 sec;   INR: 1.07 ratio         PTT - ( 18 Jul 2018 05:56 )  PTT:27.4 sec      RADIOLOGY & ADDITIONAL TESTS:    Review of systems  Gen: No weight changes, fatigue, fevers/chills, weakness  Skin: No rashes  Head/Eyes/Ears/Mouth: No headache; Normal hearing; Normal vision w/o blurriness; No sinus pain/discomfort, sore throat  Respiratory: No dyspnea, cough, wheezing, hemoptysis  CV: No chest pain, PND, orthopnea  GI: No abdominal pain, diarrhea, constipation, nausea, vomiting, melena, hematochezia  : No increased frequency, dysuria, hematuria, nocturia  MSK: No joint pain/swelling; no back pain; no edema  Neuro: No dizziness/lightheadedness, weakness, seizures, numbness, tingling  Heme: No easy bruising or bleeding  Endo: No heat/cold intolerance  Psych: No significant nervousness, anxiety, stress, depression  All other systems were reviewed and are negative, except as noted.    PHYSICAL EXAM:  Constitutional: Well developed / well nourished  Eyes: Anicteric, PERRLA  ENMT: nc/at  Neck: central line *****************  Respiratory: CTA B/L  Cardiovascular: RRR  Gastrointestinal: Soft abdomen, mild tender to touch at surgical site, ND  Genitourinary: Urinary catheter in place*****Voiding spontaneously  Extremities: SCD's in place and working bilaterally  Vascular: Palpable dp pulses bilaterally  Neurological: A&O x3  Skin: Mild serosanguinous azeem on wound dressing*******Wound open to air no erythema and evidence of infection noted  Musculoskeletal: Moving all extremities  Psychiatric: Responsive INTERVAL HPI/OVERNIGHT EVENTS:  Patient seen with multidisciplinary team (Nephrologist, pharmacist, nurse, nurse manager, NP, MD surgeons, transplant cordinator,  nutritionist, and  )  in am rounds and examined with Dr. Castaneda.  67 year old male with a PMH of ESRD on HD (2015), DM (not on insulin), HTN, bilateral nephrectomy for renal malignancy (2015) now s/p HCV + donor DDRT. Recipient CMV-/EBV+, CPRA 0%, did not make urine pre-op.  Donor 40 y/o male KDPI 30%. CIT 22 hours.     Transferred to SICU after OR for hypotension and was briefly on Neosyneprhine and levophed. Post-op renal duplex completed demonstrating good flow and no KEREN. This morning patient off pressors. UOP 130ml. Creatinine 10.53, K+4.9. VSS          MEDICATIONS  (STANDING):  docusate sodium 100 milliGRAM(s) Oral two times a day  insulin lispro (HumaLOG) corrective regimen sliding scale   SubCutaneous every 4 hours  methylPREDNISolone sodium succinate Injectable 125 milliGRAM(s) IV Push every 12 hours  methylPREDNISolone sodium succinate Injectable   IV Push   mycophenolate mofetil 1000 milliGRAM(s) Oral two times a day  nystatin    Suspension 178392 Unit(s) Swish and Swallow four times a day  senna 2 Tablet(s) Oral at bedtime  sodium chloride 0.9%. 1000 milliLiter(s) (70 mL/Hr) IV Continuous <Continuous>  tacrolimus 3 milliGRAM(s) Oral every 12 hours  trimethoprim   80 mG/sulfamethoxazole 400 mG 1 Tablet(s) Oral daily  valGANciclovir 900 milliGRAM(s) Oral daily    MEDICATIONS  (PRN):  oxyCODONE    5 mG/acetaminophen 325 mG 1 Tablet(s) Oral every 4 hours PRN Moderate Pain (4 - 6)  oxyCODONE    5 mG/acetaminophen 325 mG 2 Tablet(s) Oral every 4 hours PRN Severe Pain (7 - 10)      Allergies    No Known Allergies    Intolerances        Vital Signs Last 24 Hrs  T(C): 36.6 (18 Jul 2018 07:00), Max: 36.9 (17 Jul 2018 13:35)  T(F): 97.8 (18 Jul 2018 07:00), Max: 98.4 (17 Jul 2018 13:35)  HR: 87 (18 Jul 2018 12:00) (72 - 132)  BP: 139/82 (18 Jul 2018 10:00) (116/71 - 139/82)  BP(mean): 105 (18 Jul 2018 10:00) (84 - 105)  RR: 14 (18 Jul 2018 12:00) (13 - 26)  SpO2: 96% (18 Jul 2018 12:00) (88% - 100%)    LABS:                        10.3   13.5  )-----------( 136      ( 18 Jul 2018 08:19 )             30.6     07-18    139  |  105  |  34<H>  ----------------------------<  249<H>  4.9   |  16<L>  |  10.53<H>    Ca    8.1<L>      18 Jul 2018 08:19  Phos  5.8     07-18  Mg     1.4     07-18    TPro  6.1  /  Alb  3.5  /  TBili  0.2  /  DBili  x   /  AST  23  /  ALT  14  /  AlkPhos  65  07-18    PT/INR - ( 18 Jul 2018 05:56 )   PT: 11.6 sec;   INR: 1.07 ratio         PTT - ( 18 Jul 2018 05:56 )  PTT:27.4 sec      RADIOLOGY & ADDITIONAL TESTS:  EXAM:  US KIDNEY TRANSPLANT W DOPP RT                        PROCEDURE DATE:  07/18/2018      INTERPRETATION:  CLINICAL INFORMATION: Status post renal transplant   07/18/2018. Assess transplant kidney.    COMPARISON: CT abdomen abdomen and pelvis 4 07/02/2018.     TECHNIQUE: Grayscale, Color and spectral Doppler evaluation of a RIGHT   renal transplant.     FINDINGS:    Renal Transplant: 11.7 cm. No renal mass, hydronephrosis or calculi.   There is trace perinephric free fluid at the lower pole of the transplant   kidney.    Urinary bladder: Collapsed around a Velez catheter.    Color and spectral Doppler reveals homogeneous flow throughout the   transplant.    Peak iliac artery velocity is 62 cm/sec pre-anastomosis, 108 cm/sec at   the anastomosis, and 138 cm/sec post anastomosis.    Transplant Renal Artery:   Peak systolic velocity is 117 cm/sec anastomosis, 108 cm/sec proximal,   114 cm/sec mid, 79 cm/sec distal and 79 cm/sec hilum.   Resistive Indices Range: 0.57-0.71.    Transplant Renal Vein: Patent.    IMPRESSION:     1.  No evidence of a significant renal artery stenosis.   2.  Trace free perinephric fluid.        Review of systems  Gen: No weight changes, fatigue, fevers/chills, weakness  Skin: No rashes  Head/Eyes/Ears/Mouth: No headache; Normal hearing; Normal vision w/o blurriness; No sinus pain/discomfort, sore throat  Respiratory: No dyspnea, cough, wheezing, hemoptysis  CV: No chest pain, PND, orthopnea  GI: Reports incision pain and TTP. Denies diarrhea, constipation, nausea, vomiting, melena, hematochezia  : Velez in situ  MSK: No joint pain/swelling; no back pain; no edema  Neuro: No dizziness/lightheadedness, weakness, seizures, numbness, tingling  Heme: No easy bruising or bleeding  Endo: No heat/cold intolerance  Psych: No significant nervousness, anxiety, stress, depression  All other systems were reviewed and are negative, except as noted.    PHYSICAL EXAM:  Constitutional: Well developed / well nourished  Eyes: Anicteric, PERRLA  ENMT: nc/at  Neck: supple  Respiratory: CTA B/L  Cardiovascular: RRR  Gastrointestinal: Soft abdomen, mild tender to touch at surgical site, ND. ALEXANDR with SS output  Genitourinary: Urinary catheter in place  Extremities: SCD's in place and working bilaterally  Vascular: Palpable dp pulses bilaterally, no edema  Neurological: A&O x3  Skin: Mild serosanguinous azeem on wound dressing  Musculoskeletal: Moving all extremities  Psychiatric: Responsive

## 2018-07-18 NOTE — PROGRESS NOTE ADULT - ASSESSMENT
67 year old male with a PMH of ESRD on HD (2015), DM (not on insulin), HTN, bilateral nephrectomy for renal malignancy (2015) now POD #1 HCV + donor DDRT

## 2018-07-18 NOTE — CHART NOTE - NSCHARTNOTEFT_GEN_A_CORE
difficult cardiac windows because of lung hyperinflation    -hyperdynamic LV  -no evidence of RV overload  -IVC 1.9 cm without respiratory variation

## 2018-07-19 LAB
ANION GAP SERPL CALC-SCNC: 13 MMOL/L — SIGNIFICANT CHANGE UP (ref 5–17)
ANION GAP SERPL CALC-SCNC: 14 MMOL/L — SIGNIFICANT CHANGE UP (ref 5–17)
APTT BLD: 27.1 SEC — LOW (ref 27.5–37.4)
BUN SERPL-MCNC: 32 MG/DL — HIGH (ref 7–23)
BUN SERPL-MCNC: 40 MG/DL — HIGH (ref 7–23)
CA-I BLD-SCNC: 1.1 MMOL/L — LOW (ref 1.12–1.3)
CALCIUM SERPL-MCNC: 8.3 MG/DL — LOW (ref 8.4–10.5)
CALCIUM SERPL-MCNC: 8.4 MG/DL — SIGNIFICANT CHANGE UP (ref 8.4–10.5)
CHLORIDE SERPL-SCNC: 102 MMOL/L — SIGNIFICANT CHANGE UP (ref 96–108)
CHLORIDE SERPL-SCNC: 102 MMOL/L — SIGNIFICANT CHANGE UP (ref 96–108)
CO2 SERPL-SCNC: 21 MMOL/L — LOW (ref 22–31)
CO2 SERPL-SCNC: 21 MMOL/L — LOW (ref 22–31)
CREAT SERPL-MCNC: 8.57 MG/DL — HIGH (ref 0.5–1.3)
CREAT SERPL-MCNC: 9.73 MG/DL — HIGH (ref 0.5–1.3)
GAS PNL BLDA: SIGNIFICANT CHANGE UP
GLUCOSE BLDC GLUCOMTR-MCNC: 160 MG/DL — HIGH (ref 70–99)
GLUCOSE BLDC GLUCOMTR-MCNC: 170 MG/DL — HIGH (ref 70–99)
GLUCOSE BLDC GLUCOMTR-MCNC: 183 MG/DL — HIGH (ref 70–99)
GLUCOSE BLDC GLUCOMTR-MCNC: 227 MG/DL — HIGH (ref 70–99)
GLUCOSE BLDC GLUCOMTR-MCNC: 231 MG/DL — HIGH (ref 70–99)
GLUCOSE SERPL-MCNC: 194 MG/DL — HIGH (ref 70–99)
GLUCOSE SERPL-MCNC: 213 MG/DL — HIGH (ref 70–99)
HCT VFR BLD CALC: 26.7 % — LOW (ref 39–50)
HGB BLD-MCNC: 9 G/DL — LOW (ref 13–17)
INR BLD: 1.04 RATIO — SIGNIFICANT CHANGE UP (ref 0.88–1.16)
LDH SERPL L TO P-CCNC: 176 U/L — SIGNIFICANT CHANGE UP (ref 50–242)
MAGNESIUM SERPL-MCNC: 2.1 MG/DL — SIGNIFICANT CHANGE UP (ref 1.6–2.6)
MAGNESIUM SERPL-MCNC: 2.2 MG/DL — SIGNIFICANT CHANGE UP (ref 1.6–2.6)
MCHC RBC-ENTMCNC: 33.8 GM/DL — SIGNIFICANT CHANGE UP (ref 32–36)
MCHC RBC-ENTMCNC: 35.1 PG — HIGH (ref 27–34)
MCV RBC AUTO: 104 FL — HIGH (ref 80–100)
PHOSPHATE SERPL-MCNC: 4.9 MG/DL — HIGH (ref 2.5–4.5)
PHOSPHATE SERPL-MCNC: 5.1 MG/DL — HIGH (ref 2.5–4.5)
PLATELET # BLD AUTO: 132 K/UL — LOW (ref 150–400)
POTASSIUM SERPL-MCNC: 4 MMOL/L — SIGNIFICANT CHANGE UP (ref 3.5–5.3)
POTASSIUM SERPL-MCNC: 4.1 MMOL/L — SIGNIFICANT CHANGE UP (ref 3.5–5.3)
POTASSIUM SERPL-SCNC: 4 MMOL/L — SIGNIFICANT CHANGE UP (ref 3.5–5.3)
POTASSIUM SERPL-SCNC: 4.1 MMOL/L — SIGNIFICANT CHANGE UP (ref 3.5–5.3)
PROTHROM AB SERPL-ACNC: 11.4 SEC — SIGNIFICANT CHANGE UP (ref 9.8–12.7)
RBC # BLD: 2.57 M/UL — LOW (ref 4.2–5.8)
RBC # FLD: 14.8 % — HIGH (ref 10.3–14.5)
SODIUM SERPL-SCNC: 136 MMOL/L — SIGNIFICANT CHANGE UP (ref 135–145)
SODIUM SERPL-SCNC: 137 MMOL/L — SIGNIFICANT CHANGE UP (ref 135–145)
TACROLIMUS SERPL-MCNC: 5.2 NG/ML — SIGNIFICANT CHANGE UP
WBC # BLD: 11.1 K/UL — HIGH (ref 3.8–10.5)
WBC # FLD AUTO: 11.1 K/UL — HIGH (ref 3.8–10.5)

## 2018-07-19 PROCEDURE — 71045 X-RAY EXAM CHEST 1 VIEW: CPT | Mod: 26

## 2018-07-19 PROCEDURE — 99233 SBSQ HOSP IP/OBS HIGH 50: CPT | Mod: GC

## 2018-07-19 PROCEDURE — 99232 SBSQ HOSP IP/OBS MODERATE 35: CPT | Mod: GC

## 2018-07-19 RX ORDER — TAMSULOSIN HYDROCHLORIDE 0.4 MG/1
0.4 CAPSULE ORAL AT BEDTIME
Qty: 0 | Refills: 0 | Status: DISCONTINUED | OUTPATIENT
Start: 2018-07-19 | End: 2018-07-25

## 2018-07-19 RX ORDER — INSULIN LISPRO 100/ML
4 VIAL (ML) SUBCUTANEOUS ONCE
Qty: 0 | Refills: 0 | Status: COMPLETED | OUTPATIENT
Start: 2018-07-19 | End: 2018-07-19

## 2018-07-19 RX ORDER — ATENOLOL 25 MG/1
25 TABLET ORAL DAILY
Refills: 0 | Status: DISCONTINUED | COMMUNITY
End: 2018-07-19

## 2018-07-19 RX ORDER — SIMETHICONE 80 MG/1
80 TABLET, CHEWABLE ORAL ONCE
Qty: 0 | Refills: 0 | Status: COMPLETED | OUTPATIENT
Start: 2018-07-19 | End: 2018-07-19

## 2018-07-19 RX ORDER — ASPIRIN/CALCIUM CARB/MAGNESIUM 324 MG
81 TABLET ORAL DAILY
Qty: 0 | Refills: 0 | Status: DISCONTINUED | OUTPATIENT
Start: 2018-07-19 | End: 2018-07-25

## 2018-07-19 RX ORDER — OXYCODONE HYDROCHLORIDE 5 MG/1
5 TABLET ORAL ONCE
Qty: 0 | Refills: 0 | Status: DISCONTINUED | OUTPATIENT
Start: 2018-07-19 | End: 2018-07-19

## 2018-07-19 RX ORDER — INSULIN LISPRO 100/ML
VIAL (ML) SUBCUTANEOUS
Qty: 0 | Refills: 0 | Status: DISCONTINUED | OUTPATIENT
Start: 2018-07-19 | End: 2018-07-25

## 2018-07-19 RX ORDER — OXYCODONE HYDROCHLORIDE 5 MG/1
10 TABLET ORAL EVERY 4 HOURS
Qty: 0 | Refills: 0 | Status: DISCONTINUED | OUTPATIENT
Start: 2018-07-19 | End: 2018-07-25

## 2018-07-19 RX ORDER — INSULIN LISPRO 100/ML
VIAL (ML) SUBCUTANEOUS EVERY 4 HOURS
Qty: 0 | Refills: 0 | Status: DISCONTINUED | OUTPATIENT
Start: 2018-07-19 | End: 2018-07-19

## 2018-07-19 RX ORDER — SITAGLIPTIN 25 MG/1
25 TABLET, FILM COATED ORAL
Qty: 30 | Refills: 0 | Status: DISCONTINUED | COMMUNITY
Start: 2017-04-27 | End: 2018-07-19

## 2018-07-19 RX ADMIN — Medication 500000 UNIT(S): at 05:39

## 2018-07-19 RX ADMIN — OXYCODONE HYDROCHLORIDE 5 MILLIGRAM(S): 5 TABLET ORAL at 04:57

## 2018-07-19 RX ADMIN — SIMETHICONE 80 MILLIGRAM(S): 80 TABLET, CHEWABLE ORAL at 22:56

## 2018-07-19 RX ADMIN — MYCOPHENOLATE MOFETIL 1000 MILLIGRAM(S): 250 CAPSULE ORAL at 18:06

## 2018-07-19 RX ADMIN — Medication 325 MILLIGRAM(S): at 03:55

## 2018-07-19 RX ADMIN — VALGANCICLOVIR 900 MILLIGRAM(S): 450 TABLET, FILM COATED ORAL at 11:23

## 2018-07-19 RX ADMIN — OXYCODONE HYDROCHLORIDE 5 MILLIGRAM(S): 5 TABLET ORAL at 03:55

## 2018-07-19 RX ADMIN — Medication 50 MILLILITER(S): at 05:40

## 2018-07-19 RX ADMIN — Medication 4: at 03:46

## 2018-07-19 RX ADMIN — Medication 4: at 07:34

## 2018-07-19 RX ADMIN — Medication 500000 UNIT(S): at 11:23

## 2018-07-19 RX ADMIN — Medication 4: at 14:05

## 2018-07-19 RX ADMIN — TACROLIMUS 3 MILLIGRAM(S): 5 CAPSULE ORAL at 18:06

## 2018-07-19 RX ADMIN — Medication 4 UNIT(S): at 17:20

## 2018-07-19 RX ADMIN — TAMSULOSIN HYDROCHLORIDE 0.4 MILLIGRAM(S): 0.4 CAPSULE ORAL at 21:24

## 2018-07-19 RX ADMIN — OXYCODONE HYDROCHLORIDE 5 MILLIGRAM(S): 5 TABLET ORAL at 06:54

## 2018-07-19 RX ADMIN — Medication 81 MILLIGRAM(S): at 11:24

## 2018-07-19 RX ADMIN — Medication 500000 UNIT(S): at 18:06

## 2018-07-19 RX ADMIN — TACROLIMUS 3 MILLIGRAM(S): 5 CAPSULE ORAL at 05:39

## 2018-07-19 RX ADMIN — Medication 60 MILLIGRAM(S): at 18:10

## 2018-07-19 RX ADMIN — Medication 60 MILLIGRAM(S): at 07:34

## 2018-07-19 RX ADMIN — MYCOPHENOLATE MOFETIL 1000 MILLIGRAM(S): 250 CAPSULE ORAL at 05:38

## 2018-07-19 RX ADMIN — OXYCODONE HYDROCHLORIDE 5 MILLIGRAM(S): 5 TABLET ORAL at 05:38

## 2018-07-19 RX ADMIN — FAMOTIDINE 20 MILLIGRAM(S): 10 INJECTION INTRAVENOUS at 11:24

## 2018-07-19 RX ADMIN — SENNA PLUS 2 TABLET(S): 8.6 TABLET ORAL at 21:24

## 2018-07-19 RX ADMIN — Medication 8: at 11:28

## 2018-07-19 RX ADMIN — Medication 1 TABLET(S): at 11:24

## 2018-07-19 NOTE — PROGRESS NOTE ADULT - ASSESSMENT
ASSESSMENT: 67 year old male with a PMH of ESRD on HD (2015), DM, HTN, bilateral nephrectomy (2015) for renal carcinoma s/p DDRT.      PLAN:    Neurologic: Postop pain control, Alert and oriented  - continue pain control with tylenol, oxycodone PRN    Respiratory: No issues  - Follow up AM CXR    Cardiovascular: postoperative hypotension requiring pressors; h/o HTN  - Now completely off of pressors, hemodynamically stable  - F/u AM labs    Gastrointestinal/Nutrition: No issues  - Diet: regular, consistent carb  - famotidine 20mg qd  - senna/colace    Genitourinary/Renal: h/o renal cell carcinoma s/p bilat nephrectomies s/p DDRT  - Continue NS @ 70 cc/hr, albumin 25% IVPB    Hematologic:   - no pharmacologic DVT prophylaxis  - f/u AM labs    Infectious Disease: On immunosuppression / anti-rejection medications  - Immunosuppression per transplant team    Endocrine: DM  - F/u fingerstick blood glucose levels  - C/w sliding scale    Disposition: SICU

## 2018-07-19 NOTE — DIETITIAN INITIAL EVALUATION ADULT. - PHYSICAL APPEARANCE
overweight/well nourished/BMI 34.6Kg/m2 based on current/lowest wt 109.6Kg. Pt appears well developed with no signs of muscle or fat depletion.

## 2018-07-19 NOTE — DIETITIAN INITIAL EVALUATION ADULT. - ADHERENCE
Pt manages DM2 with Januvia 1x/day. HbA1c 6% indicates good BG control. Pt on dialysis since 2015; Rx for Renvela noted. Pt manages DM2 with Januvia 1x/day and restricts sweets and sugary drinks. Pt does not check fingersticks at home. HbA1c 6% indicates good BG control. Pt on dialysis since 2015; Rx for Renvela noted. Pt reports he followed a low potassium/phosphorus diet, but did not restrict sodium due to low blood pressure.

## 2018-07-19 NOTE — DIETITIAN INITIAL EVALUATION ADULT. - FACTORS AFF FOOD INTAKE
Meal rounds pending. +BM 7/18, +3BM 7/19. Pt observed eating >75% of breakfast tray with no GI distress and no chewing/swallowing difficutly. +BM 7/18, +3BM 7/19.

## 2018-07-19 NOTE — DIETITIAN INITIAL EVALUATION ADULT. - PERTINENT MEDS FT
aspirin, nystatin, bactrim, valcyte, IV NaCl, pepcid, Humalog sliding scale, Solu-Medrol, oxycodone, flomax, CellCept, Prograf, colace, senna

## 2018-07-19 NOTE — PROGRESS NOTE ADULT - ATTENDING COMMENTS
Renal Transplant post op day 2  Oliguric, DGF, received dialysis on 7/18  Simulect induction, Tac/MMF/pred  Started on aspirin 81  2 arteries, has ureteral stent  Has central line, difficult peripheral IV    Plan:  Monitor urine out put  F/u Tac level  Reviewed electrolytes  Will follow

## 2018-07-19 NOTE — DIETITIAN INITIAL EVALUATION ADULT. - NS AS NUTRI INTERV ED CONTENT
Reviewed food safety guidelines for transplant recipients. Reviewed recommendations to avoid grapefruit, pomegranate and star fruit while taking immunosuppressant medication. Reviewed recommendations for moderate intake of sodium and carbohydrates with transplant medications. Pt was receptive and expressed understanding. Provided nutrition handout: USDA Food Safety for Transplant Recipients booklet./Other (specify) Other (specify)/Reviewed Consistent Carbohydrate diet, including carbohydrate content of foods, carbohydrate counting and reading food labels. Reviewed food safety guidelines for transplant recipients. Reviewed recommendations to avoid grapefruit, pomegranate and star fruit while taking immunosuppressant medication. Reviewed recommendations for moderate intake of sodium and carbohydrates with transplant medications. Pt was receptive and expressed understanding. Provided nutrition handout: USDA Food Safety for Transplant Recipients booklet; Type 2 Diabetes Nutrition Therapy handout; Using Nutrition Labels-Carbohydrates handout

## 2018-07-19 NOTE — DIETITIAN INITIAL EVALUATION ADULT. - ORAL INTAKE PTA
Pt interview pending. Per chart review, pt takes no nutrition supplements, reports NKFA. Pt reports he ate well at home with no nutrition issues. Per chart review, pt takes no nutrition supplements, reports NKFA.

## 2018-07-19 NOTE — PROGRESS NOTE ADULT - SUBJECTIVE AND OBJECTIVE BOX
[facility] DIVISION OF KIDNEY DISEASES AND HYPERTENSION -- FOLLOW UP NOTE  --------------------------------------------------------------------------------    HPI: 67 year old male with a PMH of ESRD on HD (2015), DM, HTN, bilateral nephrectomy (2015) admitted for possible DDRT. Pt s/p DDRT (HEP C + DONOR) with CIT of 23 hours done on 7/17/18. Induction with Solumedrol and Simulect. Pt usually has HD MWF and follows with outpatient nephrologist Dr. eWbb. Pt seen and examined in SICU. Pt transferred to SICU post-op due to low BP. Pt BP improved with some IV pressor support however has remained off IV pressor support. Pt had a session of HD on 7/18. Pt denies CP, SOB or LE edema.      PAST HISTORY  --------------------------------------------------------------------------------  No significant changes to PMH, PSH, FHx, SHx, unless otherwise noted    ALLERGIES & MEDICATIONS  --------------------------------------------------------------------------------  Allergies    No Known Allergies    Intolerances      Standing Inpatient Medications  aspirin enteric coated 81 milliGRAM(s) Oral daily  docusate sodium 100 milliGRAM(s) Oral two times a day  famotidine    Tablet 20 milliGRAM(s) Oral daily  insulin lispro (HumaLOG) corrective regimen sliding scale   SubCutaneous every 4 hours  methylPREDNISolone sodium succinate Injectable   IV Push   methylPREDNISolone sodium succinate Injectable 60 milliGRAM(s) IV Push every 12 hours  mycophenolate mofetil 1000 milliGRAM(s) Oral two times a day  nystatin    Suspension [id number] Unit(s) Swish and Swallow four times a day  senna 2 Tablet(s) Oral at bedtime  sodium chloride 0.9%. 1000 milliLiter(s) IV Continuous <Continuous>  tacrolimus 3 milliGRAM(s) Oral every 12 hours  tamsulosin 0.4 milliGRAM(s) Oral at bedtime  trimethoprim   80 mG/sulfamethoxazole 400 mG 1 Tablet(s) Oral daily  valGANciclovir 900 milliGRAM(s) Oral daily    PRN Inpatient Medications  acetaminophen   Tablet. 325 milliGRAM(s) Oral every 4 hours PRN  oxyCODONE    IR 10 milliGRAM(s) Oral every 4 hours PRN  oxyCODONE    IR 5 milliGRAM(s) Oral every 3 hours PRN      REVIEW OF SYSTEMS  --------------------------------------------------------------------------------  Gen: No weakness  Skin: No rashes  Head/Eyes/Ears/Mouth: No headache  Respiratory: No dyspnea  CV: No chest pain, PND, orthopnea  GI: No abdominal pain  : No increased frequency  MSK: No edema  Neuro: No dizziness/lightheadedness  Heme: No easy bruising or bleeding    All other systems were reviewed and are negative, except as noted.    VITALS/PHYSICAL EXAM  --------------------------------------------------------------------------------  T(C): 36.4 (07-19-18 @ 07:00), Max: 36.8 (07-18-18 @ 19:40)  HR: 95 (07-19-18 @ 10:00) (79 - 104)  BP: --  RR: 23 (07-19-18 @ 10:00) (13 - 30)  SpO2: 77% (07-19-18 @ 10:00) (75% - 100%)  Wt(kg): --  Height (cm): 177.8 (07-17-18 @ 14:15)  Weight (kg): 128 (07-17-18 @ 14:15)  BMI (kg/m2): 40.5 (07-17-18 @ 14:15)  BSA (m2): 2.42 (07-17-18 @ 14:15)      07-18-18 @ 07:01  -  07-19-18 @ 07:00  --------------------------------------------------------  IN: 2140 mL / OUT: 254 mL / NET: 1886 mL    07-19-18 @ 07:01  -  07-19-18 @ 10:16  --------------------------------------------------------  IN: 210 mL / OUT: 45 mL / NET: 165 mL    Physical Exam:  	Gen: NAD  	Pulm: CTA B/L  	CV: RRR, S1S2; no rub  	Abd: +BS, soft, nontender/nondistended + midline healed scar              Transplant: RLQ incision, no bleeding   	UE: Warm, no asterixis  	LE: Warm, no edema  	Psych: Normal affect and mood  	Skin: Warm, without rashes  	Vascular access:  L AVF     LABS/STUDIES  --------------------------------------------------------------------------------              9.0    11.1  >-----------<  132      [07-19-18 @ 02:37]              26.7     137  |  102  |  32  ----------------------------<  213      [07-19-18 @ 02:37]  4.1   |  21  |  8.57        Ca     8.3     [07-19-18 @ 02:37]      iCa    1.10     [07-19 @ 02:33]      Mg     2.1     [07-19-18 @ 02:37]      Phos  4.9     [07-19-18 @ 02:37]    TPro  6.1  /  Alb  3.5  /  TBili  0.2  /  DBili  x   /  AST  23  /  ALT  14  /  AlkPhos  65  [07-18-18 @ 08:19]    PT/INR: PT 11.4 , INR 1.04       [07-19-18 @ 02:37]  PTT: 27.1       [07-19-18 @ 02:37]          [07-19-18 @ 02:37]    Creatinine Trend:  SCr 8.57 [07-19 @ 02:37]  SCr 6.96 [07-18 @ 18:42]  SCr 10.75 [07-18 @ 13:15]  SCr 10.53 [07-18 @ 08:19]  SCr 10.71 [07-18 @ 03:34]      Urine Creatinine 12      [07-18-18 @ 09:30]  Urine Sodium 143      [07-18-18 @ 09:30]  Urine Osmolality 306      [07-18-18 @ 09:30]    HbA1c 6.0      [07-17-18 @ 16:53]    HBsAb 5.6      [07-18-18 @ 19:20]  HBsAg Nonreact      [07-18-18 @ 19:20]  HBcAb Nonreact      [07-18-18 @ 19:20]  HCV 0.27, Nonreact      [07-18-18 @ 19:20]

## 2018-07-19 NOTE — DIETITIAN INITIAL EVALUATION ADULT. - OTHER INFO
Nutrition Assessment warranted for status post kidney transplant. Pt with h/o ESRD on HD (2015), DM, HTN, bilateral nephrectomy (2015) for renal carcinoma S/P DDRT on 7/18. Per chart and team, patient continues to be oliguric S/P 500 NS and 100mg IV lasix. Urine output x 24 hours = 209ml; current UO = 0-10ml/hr. Phosphorus elevated (4.9), sodium, potassium, magnesium WNL. Last HD 7/18, no net fluids removed. Current (7/19) wt 109.6Kg significantly lower than 7/17 dosing wt 128Kg.

## 2018-07-19 NOTE — DIETITIAN INITIAL EVALUATION ADULT. - NS FNS WEIGHT USED FOR CALC
83Kg (IBW+10%). Fluid needs: urine output + 1L, or defer fluid needs on HD to medical team. Pending graft function and improved UOP, increase fluid provision to 3L as medically appropriate./ideal

## 2018-07-19 NOTE — DIETITIAN INITIAL EVALUATION ADULT. - PERTINENT LABORATORY DATA
HbA1c 6%, Fingersticks x 24 hours: 178-242mg/dL, BUN 32, creatinine 8.57, GFR 5, phosphorus 4.9 <H>, ionized calcium 1.10 <L>

## 2018-07-19 NOTE — PROGRESS NOTE ADULT - SUBJECTIVE AND OBJECTIVE BOX
HISTORY  67y Male with a PMH of ESRD on HD (2015), DM, HTN, bilateral nephrectomy (2015, patient underwent DDRT but had to be given vasopressor pushes during the operation. While no vasopressor drip was started, the vasopressors were dosed and redosed several times to avoid hypotension (reported intraoperative systolic blood pressure in 70s) per anesthesia and primary team.  Patient was brought to the SICU postoperatively, administered 3.25L in NS boluses and started on pressors for hypotension and tachycardia.      24 HOUR EVENTS: HD yesterday.  Patient has been oliguric, gave 500 NS and 100mg IV lasix but continues to be oliguric.  K continues to steadily rise after dialysis, lactate is somewhat elevated.   Pressors DC'd yesterday, now hemodynamically stable off of pressures.      SUBJECTIVE/ROS:  [ x] A ten-point review of systems was otherwise negative except as noted.  [ ] Due to altered mental status/intubation, subjective information were not able to be obtained from the patient. History was obtained, to the extent possible, from review of the chart and collateral sources of information.      NEURO  Exam: NAD, lying comfortably in bed, alert and oriented x3  Meds: acetaminophen   Tablet. 325 milliGRAM(s) Oral every 4 hours PRN Mild Pain (1 - 3)  oxyCODONE    IR 5 milliGRAM(s) Oral every 3 hours PRN Moderate Pain (4 - 6)    [x] Adequacy of sedation and pain control has been assessed and adjusted      RESPIRATORY  RR: 26 (07-18-18 @ 20:00) (13 - 30)  SpO2: 82% (07-18-18 @ 20:00) (75% - 100%)  Wt(kg): --  Exam: non-labored breathing, CTAB  Mechanical Ventilation:   ABG - ( 18 Jul 2018 18:34 )  pH: 7.43  /  pCO2: 38    /  pO2: 76    / HCO3: 25    / Base Excess: 1.4   /  SaO2: 96      Lactate: x            CARDIOVASCULAR  HR: 104 (07-18-18 @ 20:00) (79 - 132)  BP: 139/82 (07-18-18 @ 10:00) (117/59 - 139/82)  BP(mean): 105 (07-18-18 @ 10:00) (84 - 105)  ABP: 124/71 (07-18-18 @ 20:00) (81/41 - 166/68)  ABP(mean): 94 (07-18-18 @ 20:00) (55 - 103)  Wt(kg): --  CVP(cm H2O): --      Exam:  RRR  Cardiac Rhythm: sinus  Perfusion     [x ]Adequate   [ ]Inadequate  Mentation   [x ]Normal       [ ]Reduced  Extremities  [x ]Warm         [ ]Cool  Volume Status [ ]Hypervolemic [x ]Euvolemic [ ]Hypovolemic  Meds: x      GI/NUTRITION  Exam: soft, nondistended, nontender, well-healed midline scar  Diet: consistent carb regular diet  Meds: docusate sodium 100 milliGRAM(s) Oral two times a day  famotidine    Tablet 20 milliGRAM(s) Oral daily  senna 2 Tablet(s) Oral at bedtime      GENITOURINARY  I&O's Detail    07-17 @ 07:01  -  07-18 @ 07:00  --------------------------------------------------------  IN:    IV PiggyBack: 100 mL    norepinephrine Infusion: 19.2 mL    phenylephrine   Infusion: 15 mL    Sodium Chloride 0.9% IV Bolus: 2250 mL    sodium chloride 0.9%.: 350 mL  Total IN: 2734.2 mL    OUT:    Bulb: 210 mL    Indwelling Catheter - Urethral: 130 mL  Total OUT: 340 mL    Total NET: 2394.2 mL      07-18 @ 07:01 - 07-19 @ 01:22  --------------------------------------------------------  IN:    IV PiggyBack: 100 mL    Sodium Chloride 0.9% IV Bolus: 500 mL    sodium chloride 0.9%.: 700 mL  Total IN: 1300 mL    OUT:    Bulb: 45 mL    Indwelling Catheter - Urethral: 154 mL  Total OUT: 199 mL    Total NET: 1101 mL        07-18    138  |  103  |  24<H>  ----------------------------<  161<H>  3.8   |  22  |  6.96<H>    Ca    8.4      18 Jul 2018 18:42  Phos  3.2     07-18  Mg     2.0     07-18    TPro  6.1  /  Alb  3.5  /  TBili  0.2  /  DBili  x   /  AST  23  /  ALT  14  /  AlkPhos  65  07-18    [ x] Velez catheter, indication:  surgery  Meds: albumin human 25% IVPB 50 milliLiter(s) IV Intermittent once  sodium chloride 0.9%. 1000 milliLiter(s) IV Continuous <Continuous>        HEMATOLOGIC  Meds: x  [ ] VTE Prophylaxis - SCDs only                        10.1   12.6  )-----------( 136      ( 18 Jul 2018 14:38 )             30.8     PT/INR - ( 18 Jul 2018 05:56 )   PT: 11.6 sec;   INR: 1.07 ratio         PTT - ( 18 Jul 2018 05:56 )  PTT:27.4 sec  Transfusion     [ ] PRBC   [ ] Platelets   [ ] FFP   [ ] Cryoprecipitate      INFECTIOUS DISEASES  T(C): 36.8 (07-18-18 @ 19:40), Max: 36.9 (07-18-18 @ 02:15)  Wt(kg): --  WBC Count: 12.6 K/uL (07-18 @ 14:38)  WBC Count: 13.5 K/uL (07-18 @ 08:19)  WBC Count: 12.0 K/uL (07-18 @ 03:34)    Recent Cultures:    Meds: mycophenolate mofetil 1000 milliGRAM(s) Oral two times a day  nystatin    Suspension [id number] Unit(s) Swish and Swallow four times a day  tacrolimus 3 milliGRAM(s) Oral every 12 hours  trimethoprim   80 mG/sulfamethoxazole 400 mG 1 Tablet(s) Oral daily  valGANciclovir 900 milliGRAM(s) Oral daily        ENDOCRINE  Capillary Blood Glucose    Meds: insulin lispro (HumaLOG) corrective regimen sliding scale   SubCutaneous every 4 hours  methylPREDNISolone sodium succinate Injectable   IV Push   methylPREDNISolone sodium succinate Injectable 60 milliGRAM(s) IV Push every 12 hours        ACCESS DEVICES:  [x ] Peripheral IV  [ ] Central Venous Line	[ ] R	[ ] L	[ ] IJ	[ ] Fem	[ ] SC	Placed:   [x ] Arterial Line		[ ] R	[ ] L	[ ] Fem	[ ] Rad	[ ] Ax	Placed: 7/18  [ ] PICC:					[ ] Mediport  [x ] Urinary Catheter, Date Placed: 7/18  [ ] Necessity of urinary, arterial, and venous catheters discussed    OTHER MEDICATIONS:      CODE STATUS: full code    IMAGING:  < from: US Trans Kidney w/ Doppler, Right (07.18.18 @ 05:22) >  1.  No evidence of a significant renal artery stenosis.   2.  Trace free perinephric fluid.    < end of copied text > HISTORY  67y Male with a PMH of ESRD on HD (2015), DM, HTN, bilateral nephrectomy (2015, patient underwent DDRT but had to be given vasopressor pushes during the operation. While no vasopressor drip was started, the vasopressors were dosed and redosed several times to avoid hypotension (reported intraoperative systolic blood pressure in 70s) per anesthesia and primary team.  Patient was brought to the SICU postoperatively, administered 3.25L in NS boluses and started on pressors for hypotension and tachycardia.      24 HOUR EVENTS: HD yesterday.  Patient has been oliguric, gave 500 NS and 100mg IV lasix but continues to be oliguric.  K continues to steadily rise after dialysis, lactate is somewhat elevated.   Pressors DC'd yesterday, now hemodynamically stable off of pressures.      SUBJECTIVE/ROS:  [ x] A ten-point review of systems was otherwise negative except as noted.  [ ] Due to altered mental status/intubation, subjective information were not able to be obtained from the patient. History was obtained, to the extent possible, from review of the chart and collateral sources of information.      NEURO  Exam: NAD, lying comfortably in bed, alert and oriented x3  Meds: acetaminophen   Tablet. 325 milliGRAM(s) Oral every 4 hours PRN Mild Pain (1 - 3)  oxyCODONE    IR 5 milliGRAM(s) Oral every 3 hours PRN Moderate Pain (4 - 6)    [x] Adequacy of sedation and pain control has been assessed and adjusted      RESPIRATORY  RR: 26 (07-18-18 @ 20:00) (13 - 30)  SpO2: 82% (07-18-18 @ 20:00) (75% - 100%)  Wt(kg): --  Exam: non-labored breathing, CTAB  Mechanical Ventilation:   ABG - ( 18 Jul 2018 18:34 )  pH: 7.43  /  pCO2: 38    /  pO2: 76    / HCO3: 25    / Base Excess: 1.4   /  SaO2: 96      Lactate: x        ABG - ( 19 Jul 2018 02:34 )  pH, Arterial: 7.40  pH, Blood: x     /  pCO2: 39    /  pO2: 84    / HCO3: 24    / Base Excess: -.5   /  SaO2: 97            CARDIOVASCULAR  HR: 104 (07-18-18 @ 20:00) (79 - 132)  BP: 139/82 (07-18-18 @ 10:00) (117/59 - 139/82)  BP(mean): 105 (07-18-18 @ 10:00) (84 - 105)  ABP: 124/71 (07-18-18 @ 20:00) (81/41 - 166/68)  ABP(mean): 94 (07-18-18 @ 20:00) (55 - 103)  Wt(kg): --  CVP(cm H2O): --      Exam:  RRR  Cardiac Rhythm: sinus  Perfusion     [x ]Adequate   [ ]Inadequate  Mentation   [x ]Normal       [ ]Reduced  Extremities  [x ]Warm         [ ]Cool  Volume Status [ ]Hypervolemic [x ]Euvolemic [ ]Hypovolemic  Meds: x      GI/NUTRITION  Exam: soft, nondistended, nontender, well-healed midline scar  Diet: consistent carb regular diet  Meds: docusate sodium 100 milliGRAM(s) Oral two times a day  famotidine    Tablet 20 milliGRAM(s) Oral daily  senna 2 Tablet(s) Oral at bedtime      GENITOURINARY  I&O's Detail    07-17 @ 07:01 - 07-18 @ 07:00  --------------------------------------------------------  IN:    IV PiggyBack: 100 mL    norepinephrine Infusion: 19.2 mL    phenylephrine   Infusion: 15 mL    Sodium Chloride 0.9% IV Bolus: 2250 mL    sodium chloride 0.9%.: 350 mL  Total IN: 2734.2 mL    OUT:    Bulb: 210 mL    Indwelling Catheter - Urethral: 130 mL  Total OUT: 340 mL    Total NET: 2394.2 mL      07-18 @ 07:01  -  07-19 @ 01:22  --------------------------------------------------------  IN:    IV PiggyBack: 100 mL    Sodium Chloride 0.9% IV Bolus: 500 mL    sodium chloride 0.9%.: 700 mL  Total IN: 1300 mL    OUT:    Bulb: 45 mL    Indwelling Catheter - Urethral: 154 mL  Total OUT: 199 mL    Total NET: 1101 mL        07-18    138  |  103  |  24<H>  ----------------------------<  161<H>  3.8   |  22  |  6.96<H>    Ca    8.4      18 Jul 2018 18:42  Phos  3.2     07-18  Mg     2.0     07-18    TPro  6.1  /  Alb  3.5  /  TBili  0.2  /  DBili  x   /  AST  23  /  ALT  14  /  AlkPhos  65  07-18 07-19    137  |  102  |  32<H>  ----------------------------<  213<H>  4.1   |  21<L>  |  8.57<H>    Ca    8.3<L>      19 Jul 2018 02:37  Phos  4.9     07-19  Mg     2.1     07-19    TPro  6.1  /  Alb  3.5  /  TBili  0.2  /  DBili  x   /  AST  23  /  ALT  14  /  AlkPhos  65  07-18      [ x] Velez catheter, indication:  surgery  Meds: albumin human 25% IVPB 50 milliLiter(s) IV Intermittent once  sodium chloride 0.9%. 1000 milliLiter(s) IV Continuous <Continuous>        HEMATOLOGIC  Meds: x  [ ] VTE Prophylaxis - SCDs only                        10.1   12.6  )-----------( 136      ( 18 Jul 2018 14:38 )             30.8     PT/INR - ( 18 Jul 2018 05:56 )   PT: 11.6 sec;   INR: 1.07 ratio         PTT - ( 18 Jul 2018 05:56 )  PTT:27.4 sec  Transfusion     [ ] PRBC   [ ] Platelets   [ ] FFP   [ ] Cryoprecipitate                          9.0    11.1  )-----------( 132      ( 19 Jul 2018 02:37 )             26.7       PT/INR - ( 19 Jul 2018 02:37 )   PT: 11.4 sec;   INR: 1.04 ratio         PTT - ( 19 Jul 2018 02:37 )  PTT:27.1 sec      INFECTIOUS DISEASES  T(C): 36.8 (07-18-18 @ 19:40), Max: 36.9 (07-18-18 @ 02:15)  Wt(kg): --  WBC Count: 12.6 K/uL (07-18 @ 14:38)  WBC Count: 13.5 K/uL (07-18 @ 08:19)  WBC Count: 12.0 K/uL (07-18 @ 03:34)    Recent Cultures:    Meds: mycophenolate mofetil 1000 milliGRAM(s) Oral two times a day  nystatin    Suspension [id number] Unit(s) Swish and Swallow four times a day  tacrolimus 3 milliGRAM(s) Oral every 12 hours  trimethoprim   80 mG/sulfamethoxazole 400 mG 1 Tablet(s) Oral daily  valGANciclovir 900 milliGRAM(s) Oral daily        ENDOCRINE  Capillary Blood Glucose    Meds: insulin lispro (HumaLOG) corrective regimen sliding scale   SubCutaneous every 4 hours  methylPREDNISolone sodium succinate Injectable   IV Push   methylPREDNISolone sodium succinate Injectable 60 milliGRAM(s) IV Push every 12 hours        ACCESS DEVICES:  [x ] Peripheral IV  [ ] Central Venous Line	[ ] R	[ ] L	[ ] IJ	[ ] Fem	[ ] SC	Placed:   [x ] Arterial Line		[ ] R	[ ] L	[ ] Fem	[ ] Rad	[ ] Ax	Placed: 7/18  [ ] PICC:					[ ] Mediport  [x ] Urinary Catheter, Date Placed: 7/18  [ ] Necessity of urinary, arterial, and venous catheters discussed    OTHER MEDICATIONS:      CODE STATUS: full code    IMAGING:  < from: US Trans Kidney w/ Doppler, Right (07.18.18 @ 05:22) >  1.  No evidence of a significant renal artery stenosis.   2.  Trace free perinephric fluid.    < end of copied text >

## 2018-07-19 NOTE — PROGRESS NOTE ADULT - ASSESSMENT
67 year old male with a PMH of ESRD on HD (2015), DM, HTN, bilateral nephrectomy (2015) admitted for DDRT.

## 2018-07-19 NOTE — PROGRESS NOTE ADULT - ASSESSMENT
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

## 2018-07-19 NOTE — DIETITIAN INITIAL EVALUATION ADULT. - ENERGY NEEDS
ht: 5 feet 10 inches, current wt: 242 pounds, current BMI: 34.6 Kg/m2, IBW: 166 pounds (+/- 10%), 146% IBW. Edema: 1+ generalized (7/18); Skin: no pressure injuries.

## 2018-07-19 NOTE — DIETITIAN INITIAL EVALUATION ADULT. - NS AS NUTRI INTERV MEALS SNACK
Carbohydrate - modified diet/Continue Consistent Carbohydrate, Renal diet with snack; encourage intake of high protein, nutrient dense foods

## 2018-07-19 NOTE — PROGRESS NOTE ADULT - SUBJECTIVE AND OBJECTIVE BOX
INTERVAL HPI/OVERNIGHT EVENTS:  Patient seen with multidisciplinary team (Nephrologist, pharmacist, nurse, nurse manager, NP, MD surgeons, transplant coordinator  nutritionist, and  )  in am rounds and examined with Dr. Castaneda.  67 year old male with a PMH of ESRD on HD (2015), DM (not on insulin), HTN, bilateral nephrectomy for renal malignancy (2015) now s/p HCV + donor DDRT. Recipient CMV-/EBV+, CPRA 0%, did not make urine pre-op.  Donor 38 y/o male KDPI 30%. CIT 22 hours.  Transferred to SICU postop for hypotension and was briefly on Raymond-Synephrine and levophed. Post-op renal duplex completed demonstrating good flow and no KEREN. This morning UOP 209ml. S/P HD yesterday carolyn well.    MEDICATIONS  (STANDING):  aspirin enteric coated 81 milliGRAM(s) Oral daily  docusate sodium 100 milliGRAM(s) Oral two times a day  famotidine    Tablet 20 milliGRAM(s) Oral daily  insulin lispro (HumaLOG) corrective regimen sliding scale   SubCutaneous every 4 hours  methylPREDNISolone sodium succinate Injectable   IV Push   methylPREDNISolone sodium succinate Injectable 60 milliGRAM(s) IV Push every 12 hours  mycophenolate mofetil 1000 milliGRAM(s) Oral two times a day  nystatin    Suspension [id number] Unit(s) Swish and Swallow four times a day  senna 2 Tablet(s) Oral at bedtime  sodium chloride 0.9%. 1000 milliLiter(s) (70 mL/Hr) IV Continuous <Continuous>  tacrolimus 3 milliGRAM(s) Oral every 12 hours  tamsulosin 0.4 milliGRAM(s) Oral at bedtime  trimethoprim   80 mG/sulfamethoxazole 400 mG 1 Tablet(s) Oral daily  valGANciclovir 900 milliGRAM(s) Oral daily    MEDICATIONS  (PRN):  acetaminophen   Tablet. 325 milliGRAM(s) Oral every 4 hours PRN Mild Pain (1 - 3)  oxyCODONE    IR 10 milliGRAM(s) Oral every 4 hours PRN Severe Pain (7 - 10)  oxyCODONE    IR 5 milliGRAM(s) Oral every 3 hours PRN Moderate Pain (4 - 6)      Allergies    No Known Allergies    Intolerances        Vital Signs Last 24 Hrs  T(C): 36.4 (19 Jul 2018 07:00), Max: 36.8 (18 Jul 2018 19:40)  T(F): 97.6 (19 Jul 2018 07:00), Max: 98.3 (18 Jul 2018 23:00)  HR: 92 (19 Jul 2018 11:00) (79 - 104)  BP: --  BP(mean): --  RR: 20 (19 Jul 2018 11:00) (14 - 30)  SpO2: 99% (19 Jul 2018 11:00) (75% - 100%)    LABS:                        9.0    11.1  )-----------( 132      ( 19 Jul 2018 02:37 )             26.7     07-19    137  |  102  |  32<H>  ----------------------------<  213<H>  4.1   |  21<L>  |  8.57<H>    Ca    8.3<L>      19 Jul 2018 02:37  Phos  4.9     07-19  Mg     2.1     07-19    TPro  6.1  /  Alb  3.5  /  TBili  0.2  /  DBili  x   /  AST  23  /  ALT  14  /  AlkPhos  65  07-18    PT/INR - ( 19 Jul 2018 02:37 )   PT: 11.4 sec;   INR: 1.04 ratio         PTT - ( 19 Jul 2018 02:37 )  PTT:27.1 sec      RADIOLOGY & ADDITIONAL TESTS:     Review of systems  Gen: No weight changes, fatigue, fevers/chills, weakness  Skin: No rashes  Head/Eyes/Ears/Mouth: No headache; Normal hearing; Normal vision w/o blurriness; No sinus pain/discomfort, sore throat  Respiratory: No dyspnea, cough, wheezing, hemoptysis  CV: No chest pain, PND, orthopnea  GI: Reports incision pain and TTP. Denies diarrhea, constipation, nausea, vomiting, melena, hematochezia  : Velez in situ  MSK: No joint pain/swelling; no back pain; no edema  Neuro: No dizziness/lightheadedness, weakness, seizures, numbness, tingling  Heme: No easy bruising or bleeding  Endo: No heat/cold intolerance  Psych: No significant nervousness, anxiety, stress, depression  All other systems were reviewed and are negative, except as noted.    PHYSICAL EXAM:  Constitutional: Well developed / well nourished  Eyes: Anicteric, PERRLA  ENMT: nc/at  Neck: supple  Respiratory: CTA B/L  Cardiovascular: RRR  Gastrointestinal: Soft abdomen, mild tender to touch at surgical site, ND. ALEXANDR with SS output  Genitourinary: Urinary catheter in place  Extremities: SCD's in place and working bilaterally  Vascular: Palpable dp pulses bilaterally, no edema  Neurological: A&O x3  Skin: Mild serosanguinous azeem on wound dressing  Musculoskeletal: Moving all extremities  Psychiatric: Responsive

## 2018-07-20 LAB
ANION GAP SERPL CALC-SCNC: 15 MMOL/L — SIGNIFICANT CHANGE UP (ref 5–17)
APTT BLD: 27 SEC — LOW (ref 27.5–37.4)
BUN SERPL-MCNC: 50 MG/DL — HIGH (ref 7–23)
CA-I BLD-SCNC: 1.15 MMOL/L — SIGNIFICANT CHANGE UP (ref 1.12–1.3)
CALCIUM SERPL-MCNC: 8 MG/DL — LOW (ref 8.4–10.5)
CHLORIDE SERPL-SCNC: 101 MMOL/L — SIGNIFICANT CHANGE UP (ref 96–108)
CO2 SERPL-SCNC: 20 MMOL/L — LOW (ref 22–31)
CREAT SERPL-MCNC: 10.3 MG/DL — HIGH (ref 0.5–1.3)
GLUCOSE BLDC GLUCOMTR-MCNC: 170 MG/DL — HIGH (ref 70–99)
GLUCOSE BLDC GLUCOMTR-MCNC: 170 MG/DL — HIGH (ref 70–99)
GLUCOSE BLDC GLUCOMTR-MCNC: 199 MG/DL — HIGH (ref 70–99)
GLUCOSE BLDC GLUCOMTR-MCNC: 214 MG/DL — HIGH (ref 70–99)
GLUCOSE SERPL-MCNC: 187 MG/DL — HIGH (ref 70–99)
HCT VFR BLD CALC: 25.8 % — LOW (ref 39–50)
HGB BLD-MCNC: 8.6 G/DL — LOW (ref 13–17)
INR BLD: 1.02 RATIO — SIGNIFICANT CHANGE UP (ref 0.88–1.16)
MAGNESIUM SERPL-MCNC: 2.2 MG/DL — SIGNIFICANT CHANGE UP (ref 1.6–2.6)
MCHC RBC-ENTMCNC: 33.2 GM/DL — SIGNIFICANT CHANGE UP (ref 32–36)
MCHC RBC-ENTMCNC: 34.9 PG — HIGH (ref 27–34)
MCV RBC AUTO: 105 FL — HIGH (ref 80–100)
PHOSPHATE SERPL-MCNC: 5.8 MG/DL — HIGH (ref 2.5–4.5)
PLATELET # BLD AUTO: 132 K/UL — LOW (ref 150–400)
POTASSIUM SERPL-MCNC: 3.7 MMOL/L — SIGNIFICANT CHANGE UP (ref 3.5–5.3)
POTASSIUM SERPL-SCNC: 3.7 MMOL/L — SIGNIFICANT CHANGE UP (ref 3.5–5.3)
PROTHROM AB SERPL-ACNC: 11 SEC — SIGNIFICANT CHANGE UP (ref 9.8–12.7)
RBC # BLD: 2.45 M/UL — LOW (ref 4.2–5.8)
RBC # FLD: 15.1 % — HIGH (ref 10.3–14.5)
SODIUM SERPL-SCNC: 136 MMOL/L — SIGNIFICANT CHANGE UP (ref 135–145)
TACROLIMUS SERPL-MCNC: 6.3 NG/ML — SIGNIFICANT CHANGE UP
WBC # BLD: 8.7 K/UL — SIGNIFICANT CHANGE UP (ref 3.8–10.5)
WBC # FLD AUTO: 8.7 K/UL — SIGNIFICANT CHANGE UP (ref 3.8–10.5)

## 2018-07-20 PROCEDURE — 71045 X-RAY EXAM CHEST 1 VIEW: CPT | Mod: 26

## 2018-07-20 PROCEDURE — 99233 SBSQ HOSP IP/OBS HIGH 50: CPT | Mod: GC

## 2018-07-20 RX ORDER — GABAPENTIN 400 MG/1
300 CAPSULE ORAL
Qty: 0 | Refills: 0 | Status: DISCONTINUED | OUTPATIENT
Start: 2018-07-20 | End: 2018-07-23

## 2018-07-20 RX ORDER — FUROSEMIDE 40 MG
100 TABLET ORAL ONCE
Qty: 0 | Refills: 0 | Status: COMPLETED | OUTPATIENT
Start: 2018-07-20 | End: 2018-07-20

## 2018-07-20 RX ORDER — FUROSEMIDE 40 MG
10 TABLET ORAL
Qty: 500 | Refills: 0 | Status: DISCONTINUED | OUTPATIENT
Start: 2018-07-20 | End: 2018-07-21

## 2018-07-20 RX ORDER — FUROSEMIDE 40 MG
5 TABLET ORAL
Qty: 500 | Refills: 0 | Status: DISCONTINUED | OUTPATIENT
Start: 2018-07-20 | End: 2018-07-20

## 2018-07-20 RX ADMIN — Medication 1 TABLET(S): at 12:58

## 2018-07-20 RX ADMIN — Medication 500000 UNIT(S): at 12:58

## 2018-07-20 RX ADMIN — SODIUM CHLORIDE 70 MILLILITER(S): 9 INJECTION INTRAMUSCULAR; INTRAVENOUS; SUBCUTANEOUS at 17:03

## 2018-07-20 RX ADMIN — Medication 4: at 08:09

## 2018-07-20 RX ADMIN — TAMSULOSIN HYDROCHLORIDE 0.4 MILLIGRAM(S): 0.4 CAPSULE ORAL at 22:20

## 2018-07-20 RX ADMIN — VALGANCICLOVIR 900 MILLIGRAM(S): 450 TABLET, FILM COATED ORAL at 12:58

## 2018-07-20 RX ADMIN — TACROLIMUS 3 MILLIGRAM(S): 5 CAPSULE ORAL at 06:17

## 2018-07-20 RX ADMIN — Medication 30 MILLIGRAM(S): at 06:27

## 2018-07-20 RX ADMIN — Medication 2.5 MG/HR: at 10:47

## 2018-07-20 RX ADMIN — Medication 30 MILLIGRAM(S): at 17:54

## 2018-07-20 RX ADMIN — FAMOTIDINE 20 MILLIGRAM(S): 10 INJECTION INTRAVENOUS at 12:58

## 2018-07-20 RX ADMIN — Medication 120 MILLIGRAM(S): at 09:36

## 2018-07-20 RX ADMIN — MYCOPHENOLATE MOFETIL 1000 MILLIGRAM(S): 250 CAPSULE ORAL at 06:17

## 2018-07-20 RX ADMIN — Medication 81 MILLIGRAM(S): at 12:58

## 2018-07-20 RX ADMIN — MYCOPHENOLATE MOFETIL 1000 MILLIGRAM(S): 250 CAPSULE ORAL at 17:53

## 2018-07-20 RX ADMIN — Medication 4: at 17:04

## 2018-07-20 RX ADMIN — Medication 500000 UNIT(S): at 06:18

## 2018-07-20 RX ADMIN — GABAPENTIN 300 MILLIGRAM(S): 400 CAPSULE ORAL at 17:05

## 2018-07-20 RX ADMIN — GABAPENTIN 300 MILLIGRAM(S): 400 CAPSULE ORAL at 08:08

## 2018-07-20 RX ADMIN — Medication 500000 UNIT(S): at 17:53

## 2018-07-20 RX ADMIN — Medication 8: at 22:25

## 2018-07-20 RX ADMIN — TACROLIMUS 3 MILLIGRAM(S): 5 CAPSULE ORAL at 17:53

## 2018-07-20 RX ADMIN — Medication 5 MG/HR: at 12:36

## 2018-07-20 RX ADMIN — Medication 500000 UNIT(S): at 00:34

## 2018-07-20 RX ADMIN — Medication 4: at 12:58

## 2018-07-20 NOTE — PROGRESS NOTE ADULT - ASSESSMENT
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 Patient was on HD MWF and follows with outpatient nephrologist Dr. Webb.

## 2018-07-20 NOTE — PROGRESS NOTE ADULT - SUBJECTIVE AND OBJECTIVE BOX
[facility] DIVISION OF KIDNEY DISEASES AND HYPERTENSION -- FOLLOW UP NOTE  --------------------------------------------------------------------------------    HPI: 67 year old male with a PMH of ESRD on HD (2015), DM, HTN, bilateral nephrectomy (2015) admitted for possible DDRT. Pt s/p DDRT (HEP C + DONOR) with CIT of 23 hours done on 7/17/18. Induction with Solumedrol and Simulect. Pt usually has HD MWF and follows with outpatient nephrologist Dr. Webb. Pt was transferred to SICU post-op due to low BP. Pt BP improved with some IV pressor support however has remained off IV pressor support and was transferred to the medical floor on 7/19/18. Pt had a session of HD on 7/18. Pt seen and examined. Pt remains oliguric overnight. No plan for HD today. Trial of lasix. Pt denies CP, SOB or LE edema.      PAST HISTORY  --------------------------------------------------------------------------------  No significant changes to PMH, PSH, FHx, SHx, unless otherwise noted    ALLERGIES & MEDICATIONS  --------------------------------------------------------------------------------  Allergies    No Known Allergies    Intolerances      Standing Inpatient Medications  aspirin enteric coated 81 milliGRAM(s) Oral daily  docusate sodium 100 milliGRAM(s) Oral two times a day  famotidine    Tablet 20 milliGRAM(s) Oral daily  furosemide Infusion 5 mG/Hr IV Continuous <Continuous>  gabapentin 300 milliGRAM(s) Oral two times a day  insulin lispro (HumaLOG) corrective regimen sliding scale   SubCutaneous four times a day before meals  methylPREDNISolone sodium succinate Injectable 30 milliGRAM(s) IV Push every 12 hours  methylPREDNISolone sodium succinate Injectable   IV Push   mycophenolate mofetil 1000 milliGRAM(s) Oral two times a day  nystatin    Suspension [id number] Unit(s) Swish and Swallow four times a day  senna 2 Tablet(s) Oral at bedtime  sodium chloride 0.9%. 1000 milliLiter(s) IV Continuous <Continuous>  tacrolimus 3 milliGRAM(s) Oral every 12 hours  tamsulosin 0.4 milliGRAM(s) Oral at bedtime  trimethoprim   80 mG/sulfamethoxazole 400 mG 1 Tablet(s) Oral daily  valGANciclovir 900 milliGRAM(s) Oral daily    PRN Inpatient Medications  acetaminophen   Tablet. 325 milliGRAM(s) Oral every 4 hours PRN  oxyCODONE    IR 10 milliGRAM(s) Oral every 4 hours PRN  oxyCODONE    IR 5 milliGRAM(s) Oral every 3 hours PRN      REVIEW OF SYSTEMS  --------------------------------------------------------------------------------  Gen: No weakness  Skin: No rashes  Head/Eyes/Ears/Mouth: No headache  Respiratory: No dyspnea  CV: No chest pain, PND, orthopnea  GI: No abdominal pain  : No increased frequency  MSK: No edema  Neuro: No dizziness/lightheadedness  Heme: No easy bruising or bleeding    All other systems were reviewed and are negative, except as noted.    VITALS/PHYSICAL EXAM  --------------------------------------------------------------------------------  T(C): 36.5 (07-20-18 @ 09:30), Max: 36.8 (07-19-18 @ 18:00)  HR: 97 (07-20-18 @ 09:30) (83 - 97)  BP: 132/74 (07-20-18 @ 09:30) (126/65 - 147/83)  RR: 18 (07-20-18 @ 09:30) (18 - 20)  SpO2: 94% (07-20-18 @ 09:30) (94% - 99%)  Wt(kg): --    07-19-18 @ 07:01  -  07-20-18 @ 07:00  --------------------------------------------------------  IN: 1880 mL / OUT: 224 mL / NET: 1656 mL    07-20-18 @ 07:01  -  07-20-18 @ 10:27  --------------------------------------------------------  IN: 500 mL / OUT: 240 mL / NET: 260 mL    Physical Exam:  	Gen: NAD  	Pulm: CTA B/L  	CV: RRR, S1S2; no rub  	Abd: +BS, soft, nontender/nondistended + midline healed scar              Transplant: RLQ incision, no bleeding   	UE: Warm, no asterixis  	LE: Warm, no edema  	Psych: Normal affect and mood  	Skin: Warm, without rashes  	Vascular access:  L AVF     LABS/STUDIES  --------------------------------------------------------------------------------              8.6    8.7   >-----------<  132      [07-20-18 @ 05:50]              25.8     136  |  101  |  50  ----------------------------<  187      [07-20-18 @ 05:50]  3.7   |  20  |  10.30        Ca     8.0     [07-20-18 @ 05:50]      iCa    1.15     [07-20 @ 05:54]      Mg     2.2     [07-20-18 @ 05:50]      Phos  5.8     [07-20-18 @ 05:50]    PT/INR: PT 11.0 , INR 1.02       [07-20-18 @ 05:50]  PTT: 27.0       [07-20-18 @ 05:50]          [07-19-18 @ 02:37]    Creatinine Trend:  SCr 10.30 [07-20 @ 05:50]  SCr 9.73 [07-19 @ 14:57]  SCr 8.57 [07-19 @ 02:37]  SCr 6.96 [07-18 @ 18:42]  SCr 10.75 [07-18 @ 13:15]    Urine Creatinine 12      [07-18-18 @ 09:30]  Urine Sodium 143      [07-18-18 @ 09:30]  Urine Osmolality 306      [07-18-18 @ 09:30]    HbA1c 6.0      [07-17-18 @ 16:53]    HBsAb 5.6      [07-18-18 @ 19:20]  HBsAg Nonreact      [07-18-18 @ 19:20]  HBcAb Nonreact      [07-18-18 @ 19:20]  HCV 0.27, Nonreact      [07-18-18 @ 19:20]

## 2018-07-20 NOTE — PROGRESS NOTE ADULT - ATTENDING COMMENTS
agree with above. delayed graft function. dialyzed once so far. will start Lasix drip and reassess. immunosuppression FK, MMF and steroids

## 2018-07-20 NOTE — PROGRESS NOTE ADULT - SUBJECTIVE AND OBJECTIVE BOX
INTERVAL HPI/OVERNIGHT EVENTS:  Patient seen with multidisciplinary team (Nephrologist, pharmacist, nurse, nurse manager, NP, MD surgeons, transplant coordinator)  in am rounds and examined with Dr. Yao.  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 Recipient CMV-/EBV+, CPRA 0%, did not make urine pre-op.  Donor 41 y/o male KDPI 30%. CIT 22 hours.  Transferred to SICU postop for hypotension and was briefly on Raymond-Synephrine and levophed. Post-op renal duplex completed demonstrating good flow and no KEREN was trendfered to 6 archie yesterday (7/19).  No acute event overnight making minimal urine 220ml. S/P HD 7/18, doing well, ALEXANDR d/isidro this am site intact, NAD.      MEDICATIONS  (STANDING):  aspirin enteric coated 81 milliGRAM(s) Oral daily  docusate sodium 100 milliGRAM(s) Oral two times a day  famotidine    Tablet 20 milliGRAM(s) Oral daily  furosemide Infusion 5 mG/Hr (2.5 mL/Hr) IV Continuous <Continuous>  gabapentin 300 milliGRAM(s) Oral two times a day  insulin lispro (HumaLOG) corrective regimen sliding scale   SubCutaneous four times a day before meals  methylPREDNISolone sodium succinate Injectable 30 milliGRAM(s) IV Push every 12 hours  methylPREDNISolone sodium succinate Injectable   IV Push   mycophenolate mofetil 1000 milliGRAM(s) Oral two times a day  nystatin    Suspension 473545 Unit(s) Swish and Swallow four times a day  senna 2 Tablet(s) Oral at bedtime  sodium chloride 0.9%. 1000 milliLiter(s) (70 mL/Hr) IV Continuous <Continuous>  tacrolimus 3 milliGRAM(s) Oral every 12 hours  tamsulosin 0.4 milliGRAM(s) Oral at bedtime  trimethoprim   80 mG/sulfamethoxazole 400 mG 1 Tablet(s) Oral daily  valGANciclovir 900 milliGRAM(s) Oral daily    MEDICATIONS  (PRN):  acetaminophen   Tablet. 325 milliGRAM(s) Oral every 4 hours PRN Mild Pain (1 - 3)  oxyCODONE    IR 10 milliGRAM(s) Oral every 4 hours PRN Severe Pain (7 - 10)  oxyCODONE    IR 5 milliGRAM(s) Oral every 3 hours PRN Moderate Pain (4 - 6)      Allergies    No Known Allergies    Intolerances        Vital Signs Last 24 Hrs  T(C): 36.5 (20 Jul 2018 09:30), Max: 36.8 (19 Jul 2018 18:00)  T(F): 97.7 (20 Jul 2018 09:30), Max: 98.3 (19 Jul 2018 18:00)  HR: 97 (20 Jul 2018 09:30) (83 - 97)  BP: 132/74 (20 Jul 2018 09:30) (126/65 - 147/83)  BP(mean): --  RR: 18 (20 Jul 2018 09:30) (18 - 20)  SpO2: 94% (20 Jul 2018 09:30) (94% - 99%)    LABS:                        8.6    8.7   )-----------( 132      ( 20 Jul 2018 05:50 )             25.8     07-20    136  |  101  |  50<H>  ----------------------------<  187<H>  3.7   |  20<L>  |  10.30<H>    Ca    8.0<L>      20 Jul 2018 05:50  Phos  5.8     07-20  Mg     2.2     07-20      PT/INR - ( 20 Jul 2018 05:50 )   PT: 11.0 sec;   INR: 1.02 ratio         PTT - ( 20 Jul 2018 05:50 )  PTT:27.0 sec      RADIOLOGY & ADDITIONAL TESTS:     Review of systems  Gen: No weight changes, fatigue, fevers/chills, weakness  Skin: No rashes  Head/Eyes/Ears/Mouth: No headache; Normal hearing; Normal vision w/o blurriness; No sinus pain/discomfort, sore throat  Respiratory: No dyspnea, cough, wheezing, hemoptysis  CV: No chest pain, PND, orthopnea  GI: Reports incision pain and TTP. Denies diarrhea, constipation, nausea, vomiting, melena, hematochezia  : Velez in situ  MSK: No joint pain/swelling; no back pain; no edema  Neuro: No dizziness/lightheadedness, weakness, seizures, numbness, tingling  Heme: No easy bruising or bleeding  Endo: No heat/cold intolerance  Psych: No significant nervousness, anxiety, stress, depression  All other systems were reviewed and are negative, except as noted.    PHYSICAL EXAM:  Constitutional: Well developed / well nourished  Eyes: Anicteric, PERRLA  ENMT: nc/at  Neck: RT IJ TLC in place no erythema or sign of infection noted  Respiratory: CTA B/L  Cardiovascular: RRR  Gastrointestinal: Soft abdomen, mild tender to touch at surgical site, ND.   Genitourinary: Urinary catheter in place  Extremities: SCD's in place and working bilaterally  Vascular: Palpable dp pulses bilaterally, no edema  Neurological: A&O x3  Skin: Mild serosanguinous azeem on wound dressing  Musculoskeletal: Moving all extremities  Psychiatric: Responsive

## 2018-07-21 LAB
ANION GAP SERPL CALC-SCNC: 17 MMOL/L — SIGNIFICANT CHANGE UP (ref 5–17)
BUN SERPL-MCNC: 62 MG/DL — HIGH (ref 7–23)
CALCIUM SERPL-MCNC: 8.3 MG/DL — LOW (ref 8.4–10.5)
CHLORIDE SERPL-SCNC: 98 MMOL/L — SIGNIFICANT CHANGE UP (ref 96–108)
CO2 SERPL-SCNC: 20 MMOL/L — LOW (ref 22–31)
CREAT SERPL-MCNC: 12.91 MG/DL — HIGH (ref 0.5–1.3)
GLUCOSE BLDC GLUCOMTR-MCNC: 160 MG/DL — HIGH (ref 70–99)
GLUCOSE BLDC GLUCOMTR-MCNC: 176 MG/DL — HIGH (ref 70–99)
GLUCOSE BLDC GLUCOMTR-MCNC: 184 MG/DL — HIGH (ref 70–99)
GLUCOSE BLDC GLUCOMTR-MCNC: 284 MG/DL — HIGH (ref 70–99)
GLUCOSE SERPL-MCNC: 159 MG/DL — HIGH (ref 70–99)
HCT VFR BLD CALC: 25.9 % — LOW (ref 39–50)
HCV RNA SERPL NAA DL=5-ACNC: 356 IU/ML — HIGH
HCV RNA SPEC NAA+PROBE-LOG IU: 2.55 LOGIU/ML — HIGH
HGB BLD-MCNC: 8.6 G/DL — LOW (ref 13–17)
MAGNESIUM SERPL-MCNC: 2.2 MG/DL — SIGNIFICANT CHANGE UP (ref 1.6–2.6)
MCHC RBC-ENTMCNC: 33.2 GM/DL — SIGNIFICANT CHANGE UP (ref 32–36)
MCHC RBC-ENTMCNC: 35 PG — HIGH (ref 27–34)
MCV RBC AUTO: 105 FL — HIGH (ref 80–100)
PHOSPHATE SERPL-MCNC: 6.8 MG/DL — HIGH (ref 2.5–4.5)
PLATELET # BLD AUTO: 133 K/UL — LOW (ref 150–400)
POTASSIUM SERPL-MCNC: 4 MMOL/L — SIGNIFICANT CHANGE UP (ref 3.5–5.3)
POTASSIUM SERPL-SCNC: 4 MMOL/L — SIGNIFICANT CHANGE UP (ref 3.5–5.3)
RBC # BLD: 2.46 M/UL — LOW (ref 4.2–5.8)
RBC # FLD: 15 % — HIGH (ref 10.3–14.5)
SODIUM SERPL-SCNC: 135 MMOL/L — SIGNIFICANT CHANGE UP (ref 135–145)
TACROLIMUS SERPL-MCNC: 7.5 NG/ML — SIGNIFICANT CHANGE UP
WBC # BLD: 7.9 K/UL — SIGNIFICANT CHANGE UP (ref 3.8–10.5)
WBC # FLD AUTO: 7.9 K/UL — SIGNIFICANT CHANGE UP (ref 3.8–10.5)

## 2018-07-21 PROCEDURE — 99232 SBSQ HOSP IP/OBS MODERATE 35: CPT

## 2018-07-21 RX ORDER — FUROSEMIDE 40 MG
15 TABLET ORAL
Qty: 500 | Refills: 0 | Status: DISCONTINUED | OUTPATIENT
Start: 2018-07-21 | End: 2018-07-24

## 2018-07-21 RX ADMIN — TACROLIMUS 3 MILLIGRAM(S): 5 CAPSULE ORAL at 17:20

## 2018-07-21 RX ADMIN — GABAPENTIN 300 MILLIGRAM(S): 400 CAPSULE ORAL at 17:19

## 2018-07-21 RX ADMIN — Medication 4: at 17:19

## 2018-07-21 RX ADMIN — BASILIXIMAB 100 MILLIGRAM(S): 20 INJECTION, POWDER, FOR SOLUTION INTRAVENOUS at 08:18

## 2018-07-21 RX ADMIN — Medication 500000 UNIT(S): at 06:20

## 2018-07-21 RX ADMIN — MYCOPHENOLATE MOFETIL 1000 MILLIGRAM(S): 250 CAPSULE ORAL at 06:20

## 2018-07-21 RX ADMIN — MYCOPHENOLATE MOFETIL 1000 MILLIGRAM(S): 250 CAPSULE ORAL at 17:20

## 2018-07-21 RX ADMIN — Medication 20 MILLIGRAM(S): at 17:19

## 2018-07-21 RX ADMIN — Medication 1 TABLET(S): at 11:41

## 2018-07-21 RX ADMIN — Medication 12: at 21:51

## 2018-07-21 RX ADMIN — VALGANCICLOVIR 900 MILLIGRAM(S): 450 TABLET, FILM COATED ORAL at 11:41

## 2018-07-21 RX ADMIN — Medication 500000 UNIT(S): at 17:19

## 2018-07-21 RX ADMIN — Medication 4: at 08:18

## 2018-07-21 RX ADMIN — Medication 500000 UNIT(S): at 00:40

## 2018-07-21 RX ADMIN — Medication 5 MG/HR: at 06:21

## 2018-07-21 RX ADMIN — TACROLIMUS 3 MILLIGRAM(S): 5 CAPSULE ORAL at 06:20

## 2018-07-21 RX ADMIN — GABAPENTIN 300 MILLIGRAM(S): 400 CAPSULE ORAL at 06:24

## 2018-07-21 RX ADMIN — Medication 20 MILLIGRAM(S): at 06:20

## 2018-07-21 RX ADMIN — FAMOTIDINE 20 MILLIGRAM(S): 10 INJECTION INTRAVENOUS at 11:41

## 2018-07-21 RX ADMIN — Medication 81 MILLIGRAM(S): at 11:41

## 2018-07-21 RX ADMIN — Medication 500000 UNIT(S): at 11:41

## 2018-07-21 RX ADMIN — TAMSULOSIN HYDROCHLORIDE 0.4 MILLIGRAM(S): 0.4 CAPSULE ORAL at 21:51

## 2018-07-21 NOTE — PROGRESS NOTE ADULT - SUBJECTIVE AND OBJECTIVE BOX
INTERVAL HPI/OVERNIGHT EVENTS:  Patient seen with multidisciplinary team (Nephrologist, pharmacist, nurse, nurse manager, NP, MD surgeons, transplant coordinator)  in am rounds and examined with Dr. Yao.  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 Recipient CMV-/EBV+, CPRA 0%, did not make urine pre-op.  Donor 40 y/o male KDPI 30%. CIT 22 hours.  Transferred to SICU postop for hypotension and was briefly on Raymond-Synephrine and levophed. Post-op renal duplex completed demonstrating good flow and no KEREN was trendfered to 6 archie yesterday (7/19).  No acute event overnight making minimal urine 280ml. S/P HD 7/18, doing well, plan for HD today, on lasix drip since yesterday with minimal response, NAD.    MEDICATIONS  (STANDING):  aspirin enteric coated 81 milliGRAM(s) Oral daily  docusate sodium 100 milliGRAM(s) Oral two times a day  famotidine    Tablet 20 milliGRAM(s) Oral daily  furosemide Infusion 15 mG/Hr (7.5 mL/Hr) IV Continuous <Continuous>  gabapentin 300 milliGRAM(s) Oral two times a day  insulin lispro (HumaLOG) corrective regimen sliding scale   SubCutaneous four times a day before meals  mycophenolate mofetil 1000 milliGRAM(s) Oral two times a day  nystatin    Suspension [id number] Unit(s) Swish and Swallow four times a day  predniSONE   Tablet 20 milliGRAM(s) Oral every 12 hours  senna 2 Tablet(s) Oral at bedtime  tacrolimus 3 milliGRAM(s) Oral every 12 hours  tamsulosin 0.4 milliGRAM(s) Oral at bedtime  trimethoprim   80 mG/sulfamethoxazole 400 mG 1 Tablet(s) Oral daily  valGANciclovir 900 milliGRAM(s) Oral daily    MEDICATIONS  (PRN):  acetaminophen   Tablet. 325 milliGRAM(s) Oral every 4 hours PRN Mild Pain (1 - 3)  oxyCODONE    IR 10 milliGRAM(s) Oral every 4 hours PRN Severe Pain (7 - 10)  oxyCODONE    IR 5 milliGRAM(s) Oral every 3 hours PRN Moderate Pain (4 - 6)      Allergies    No Known Allergies    Intolerances        Vital Signs Last 24 Hrs  T(C): 36.5 (21 Jul 2018 09:58), Max: 36.6 (20 Jul 2018 17:50)  T(F): 97.7 (21 Jul 2018 09:58), Max: 97.8 (20 Jul 2018 17:50)  HR: 82 (21 Jul 2018 09:58) (82 - 89)  BP: 126/72 (21 Jul 2018 09:58) (121/68 - 144/78)  BP(mean): --  RR: 18 (21 Jul 2018 09:58) (18 - 18)  SpO2: 100% (21 Jul 2018 09:58) (97% - 100%)    LABS:                        8.6    7.9   )-----------( 133      ( 21 Jul 2018 06:06 )             25.9     07-21    135  |  98  |  62<H>  ----------------------------<  159<H>  4.0   |  20<L>  |  12.91<H>    Ca    8.3<L>      21 Jul 2018 06:06  Phos  6.8     07-21  Mg     2.2     07-21      PT/INR - ( 20 Jul 2018 05:50 )   PT: 11.0 sec;   INR: 1.02 ratio         PTT - ( 20 Jul 2018 05:50 )  PTT:27.0 sec      RADIOLOGY & ADDITIONAL TESTS:    Review of systems  Gen: No weight changes, fatigue, fevers/chills, weakness  Skin: No rashes  Head/Eyes/Ears/Mouth: No headache; Normal hearing; Normal vision w/o blurriness; No sinus pain/discomfort, sore throat  Respiratory: No dyspnea, cough, wheezing, hemoptysis  CV: No chest pain, PND, orthopnea  GI: Reports incision pain and TTP. Denies diarrhea, constipation, nausea, vomiting, melena, hematochezia  : Velez in situ  MSK: No joint pain/swelling; no back pain; no edema  Neuro: No dizziness/lightheadedness, weakness, seizures, numbness, tingling  Heme: No easy bruising or bleeding  Endo: No heat/cold intolerance  Psych: No significant nervousness, anxiety, stress, depression  All other systems were reviewed and are negative, except as noted.    PHYSICAL EXAM:  Constitutional: Well developed / well nourished  Eyes: Anicteric, PERRLA  ENMT: nc/at  Neck: RT IJ TLC in place no erythema or sign of infection noted  Respiratory: CTA B/L  Cardiovascular: RRR  Gastrointestinal: Soft abdomen, mild tender to touch at surgical site, ND.   Genitourinary: Urinary catheter in place  Extremities: SCD's in place and working bilaterally  Vascular: Palpable dp pulses bilaterally, no edema  Neurological: A&O x3  Skin: Mild serosanguinous azeem on wound dressing  Musculoskeletal: Moving all extremities  Psychiatric: Responsive

## 2018-07-21 NOTE — PROVIDER CONTACT NOTE (OTHER) - BACKGROUND
S/P DDRT 7/16. Hx htn, dm. L AVF last HD 7/18
Patient admitted to [facility] on 18 for a  donor kidney transplant.
Pt is s/p kidney transplant
s/p right renal transplant from  donor on

## 2018-07-21 NOTE — PROVIDER CONTACT NOTE (OTHER) - ACTION/TREATMENT ORDERED:
Will continue to monitor, strict I&O
as per Dannie KNIG - verification w/ pharmacy to be discussed w/ transplant team rounds on 07/18-07/19. No action needed at this time.
NP Ilir Thomas made aware; stated she will be increasing the Lasix drip; no further actions at this time.
if pt produces no urine in an hour, will inform transplant team for a possible ultrasound.

## 2018-07-21 NOTE — PROVIDER CONTACT NOTE (OTHER) - ASSESSMENT
Urine output total throughout shift 60. Prior shift was 80.  IV Lasix continued & encouraging PO.
Patient is AxOx4; resting in bed; armenta catheter care maintained.
see KBC for full systems assessment

## 2018-07-21 NOTE — PROGRESS NOTE ADULT - SUBJECTIVE AND OBJECTIVE BOX
[facility] Division of Kidney Diseases & Hypertension  FOLLOW UP NOTE  --------------------------------------------------------------------------------  Chief Complaint:    24 hour events/subjective:    remains with poor urine output   feels ok         PAST HISTORY  --------------------------------------------------------------------------------  No significant changes to PMH, PSH, FHx, SHx, unless otherwise noted    ALLERGIES & MEDICATIONS  --------------------------------------------------------------------------------  Allergies    No Known Allergies    Intolerances      Standing Inpatient Medications  aspirin enteric coated 81 milliGRAM(s) Oral daily  docusate sodium 100 milliGRAM(s) Oral two times a day  famotidine    Tablet 20 milliGRAM(s) Oral daily  furosemide Infusion 15 mG/Hr IV Continuous <Continuous>  gabapentin 300 milliGRAM(s) Oral two times a day  insulin lispro (HumaLOG) corrective regimen sliding scale   SubCutaneous four times a day before meals  mycophenolate mofetil 1000 milliGRAM(s) Oral two times a day  nystatin    Suspension [id number] Unit(s) Swish and Swallow four times a day  senna 2 Tablet(s) Oral at bedtime  tacrolimus 3 milliGRAM(s) Oral every 12 hours  tamsulosin 0.4 milliGRAM(s) Oral at bedtime  trimethoprim   80 mG/sulfamethoxazole 400 mG 1 Tablet(s) Oral daily  valGANciclovir 900 milliGRAM(s) Oral daily    PRN Inpatient Medications  acetaminophen   Tablet. 325 milliGRAM(s) Oral every 4 hours PRN  oxyCODONE    IR 10 milliGRAM(s) Oral every 4 hours PRN  oxyCODONE    IR 5 milliGRAM(s) Oral every 3 hours PRN      VITALS/PHYSICAL EXAM  --------------------------------------------------------------------------------  T(C): 37 (07-21-18 @ 16:00), Max: 37 (07-21-18 @ 16:00)  HR: 90 (07-21-18 @ 16:00) (82 - 90)  BP: 140/80 (07-21-18 @ 16:00) (121/68 - 144/78)  RR: 18 (07-21-18 @ 16:00) (18 - 18)  SpO2: 98% (07-21-18 @ 16:00) (98% - 100%)  Wt(kg): --    07-20-18 @ 07:01  -  07-21-18 @ 07:00  --------------------------------------------------------  IN: 2235 mL / OUT: 280 mL / NET: 1955 mL    07-21-18 @ 07:01  -  07-21-18 @ 18:26  --------------------------------------------------------  IN: 290 mL / OUT: 220 mL / NET: 70 mL      Physical Exam:  	Gen: NAD, well-appearing  	Pulm: CTA B/L  	CV: RRR, S1S2; no rub  	Back: No spinal or CVA tenderness; no sacral edema  	Abd: +BS, soft, nontender/nondistended  	Skin: Warm, without rashes  	    LABS/STUDIES  --------------------------------------------------------------------------------              8.6    7.9   >-----------<  133      [07-21-18 @ 06:06]              25.9     135  |  98  |  62  ----------------------------<  159      [07-21-18 @ 06:06]  4.0   |  20  |  12.91        Ca     8.3     [07-21-18 @ 06:06]      iCa    1.15     [07-20 @ 05:54]      Mg     2.2     [07-21-18 @ 06:06]      Phos  6.8     [07-21-18 @ 06:06]      PT/INR: PT 11.0 , INR 1.02       [07-20-18 @ 05:50]  PTT: 27.0       [07-20-18 @ 05:50]      Creatinine Trend:  SCr 12.91 [07-21 @ 06:06]  SCr 10.30 [07-20 @ 05:50]  SCr 9.73 [07-19 @ 14:57]  SCr 8.57 [07-19 @ 02:37]  SCr 6.96 [07-18 @ 18:42]      Urine Creatinine 12      [07-18-18 @ 09:30]  Urine Sodium 143      [07-18-18 @ 09:30]  Urine Osmolality 306      [07-18-18 @ 09:30]    HbA1c 6.0      [07-17-18 @ 16:53]    HBsAb 5.6      [07-18-18 @ 19:20]  HBsAg Nonreact      [07-18-18 @ 19:20]  HBcAb Nonreact      [07-18-18 @ 19:20]  HCV 0.27, Nonreact      [07-18-18 @ 19:20]      A/P     esrd s/p DDKT on 7/17 with DGF- Dialyzed today. no fluid removal. On lasix gtt.

## 2018-07-21 NOTE — PROGRESS NOTE ADULT - ASSESSMENT
67 year old male with a PMH of ESRD on HD (2015), DM (not on insulin), HTN, bilateral nephrectomy for renal malignancy (2015), was  followed with outpatient nephrologist Dr. Bonnie COCHRAN MWF, now POD #4 HCV + donor DDRT with delayed graft function making minimal urine s/p HD 2 days ago and plan for HD today, started on lasix drip yesterday with minimal affect.

## 2018-07-22 LAB
ANION GAP SERPL CALC-SCNC: 14 MMOL/L — SIGNIFICANT CHANGE UP (ref 5–17)
BUN SERPL-MCNC: 49 MG/DL — HIGH (ref 7–23)
CALCIUM SERPL-MCNC: 8.2 MG/DL — LOW (ref 8.4–10.5)
CHLORIDE SERPL-SCNC: 100 MMOL/L — SIGNIFICANT CHANGE UP (ref 96–108)
CO2 SERPL-SCNC: 24 MMOL/L — SIGNIFICANT CHANGE UP (ref 22–31)
CREAT SERPL-MCNC: 10.68 MG/DL — HIGH (ref 0.5–1.3)
GLUCOSE BLDC GLUCOMTR-MCNC: 140 MG/DL — HIGH (ref 70–99)
GLUCOSE BLDC GLUCOMTR-MCNC: 179 MG/DL — HIGH (ref 70–99)
GLUCOSE BLDC GLUCOMTR-MCNC: 193 MG/DL — HIGH (ref 70–99)
GLUCOSE BLDC GLUCOMTR-MCNC: 234 MG/DL — HIGH (ref 70–99)
GLUCOSE SERPL-MCNC: 131 MG/DL — HIGH (ref 70–99)
HCT VFR BLD CALC: 24.9 % — LOW (ref 39–50)
HGB BLD-MCNC: 8.4 G/DL — LOW (ref 13–17)
MAGNESIUM SERPL-MCNC: 2.1 MG/DL — SIGNIFICANT CHANGE UP (ref 1.6–2.6)
MCHC RBC-ENTMCNC: 33.8 GM/DL — SIGNIFICANT CHANGE UP (ref 32–36)
MCHC RBC-ENTMCNC: 35 PG — HIGH (ref 27–34)
MCV RBC AUTO: 103 FL — HIGH (ref 80–100)
PHOSPHATE SERPL-MCNC: 5.7 MG/DL — HIGH (ref 2.5–4.5)
PLATELET # BLD AUTO: 118 K/UL — LOW (ref 150–400)
POTASSIUM SERPL-MCNC: 3.5 MMOL/L — SIGNIFICANT CHANGE UP (ref 3.5–5.3)
POTASSIUM SERPL-SCNC: 3.5 MMOL/L — SIGNIFICANT CHANGE UP (ref 3.5–5.3)
RBC # BLD: 2.41 M/UL — LOW (ref 4.2–5.8)
RBC # FLD: 15.3 % — HIGH (ref 10.3–14.5)
SODIUM SERPL-SCNC: 138 MMOL/L — SIGNIFICANT CHANGE UP (ref 135–145)
TACROLIMUS SERPL-MCNC: 5.8 NG/ML — SIGNIFICANT CHANGE UP
WBC # BLD: 5.9 K/UL — SIGNIFICANT CHANGE UP (ref 3.8–10.5)
WBC # FLD AUTO: 5.9 K/UL — SIGNIFICANT CHANGE UP (ref 3.8–10.5)

## 2018-07-22 PROCEDURE — 99232 SBSQ HOSP IP/OBS MODERATE 35: CPT | Mod: GC

## 2018-07-22 RX ORDER — TACROLIMUS 5 MG/1
4 CAPSULE ORAL EVERY 12 HOURS
Qty: 0 | Refills: 0 | Status: DISCONTINUED | OUTPATIENT
Start: 2018-07-22 | End: 2018-07-23

## 2018-07-22 RX ORDER — TACROLIMUS 5 MG/1
1 CAPSULE ORAL ONCE
Qty: 0 | Refills: 0 | Status: COMPLETED | OUTPATIENT
Start: 2018-07-22 | End: 2018-07-22

## 2018-07-22 RX ORDER — TACROLIMUS 5 MG/1
1 CAPSULE ORAL ONCE
Qty: 0 | Refills: 0 | Status: DISCONTINUED | OUTPATIENT
Start: 2018-07-22 | End: 2018-07-22

## 2018-07-22 RX ADMIN — Medication 100 MILLIGRAM(S): at 17:54

## 2018-07-22 RX ADMIN — Medication 10 MILLIGRAM(S): at 05:51

## 2018-07-22 RX ADMIN — Medication 7.5 MG/HR: at 05:54

## 2018-07-22 RX ADMIN — VALGANCICLOVIR 900 MILLIGRAM(S): 450 TABLET, FILM COATED ORAL at 11:58

## 2018-07-22 RX ADMIN — GABAPENTIN 300 MILLIGRAM(S): 400 CAPSULE ORAL at 05:54

## 2018-07-22 RX ADMIN — MYCOPHENOLATE MOFETIL 1000 MILLIGRAM(S): 250 CAPSULE ORAL at 17:56

## 2018-07-22 RX ADMIN — Medication 500000 UNIT(S): at 17:54

## 2018-07-22 RX ADMIN — Medication 4: at 21:36

## 2018-07-22 RX ADMIN — TACROLIMUS 4 MILLIGRAM(S): 5 CAPSULE ORAL at 17:54

## 2018-07-22 RX ADMIN — Medication 1 TABLET(S): at 11:58

## 2018-07-22 RX ADMIN — Medication 4: at 11:58

## 2018-07-22 RX ADMIN — Medication 100 MILLIGRAM(S): at 05:52

## 2018-07-22 RX ADMIN — Medication 8: at 16:37

## 2018-07-22 RX ADMIN — TACROLIMUS 3 MILLIGRAM(S): 5 CAPSULE ORAL at 05:52

## 2018-07-22 RX ADMIN — TACROLIMUS 1 MILLIGRAM(S): 5 CAPSULE ORAL at 10:28

## 2018-07-22 RX ADMIN — Medication 10 MILLIGRAM(S): at 17:54

## 2018-07-22 RX ADMIN — MYCOPHENOLATE MOFETIL 1000 MILLIGRAM(S): 250 CAPSULE ORAL at 05:52

## 2018-07-22 RX ADMIN — TAMSULOSIN HYDROCHLORIDE 0.4 MILLIGRAM(S): 0.4 CAPSULE ORAL at 21:35

## 2018-07-22 RX ADMIN — Medication 500000 UNIT(S): at 05:53

## 2018-07-22 RX ADMIN — SENNA PLUS 2 TABLET(S): 8.6 TABLET ORAL at 21:35

## 2018-07-22 RX ADMIN — Medication 500000 UNIT(S): at 11:59

## 2018-07-22 RX ADMIN — FAMOTIDINE 20 MILLIGRAM(S): 10 INJECTION INTRAVENOUS at 11:58

## 2018-07-22 RX ADMIN — GABAPENTIN 300 MILLIGRAM(S): 400 CAPSULE ORAL at 17:54

## 2018-07-22 RX ADMIN — Medication 81 MILLIGRAM(S): at 11:59

## 2018-07-22 RX ADMIN — Medication 500000 UNIT(S): at 00:47

## 2018-07-22 NOTE — PROGRESS NOTE ADULT - SUBJECTIVE AND OBJECTIVE BOX
[facility] DIVISION OF KIDNEY DISEASES AND HYPERTENSION -- FOLLOW UP NOTE  --------------------------------------------------------------------------------  Chief Complaint:  renal transplant    24 hour events/subjective:  Pt s/p HD yesterday. Says it went well. Feeling well- no cp or SOB. Remains on lasix drip.      PAST HISTORY  --------------------------------------------------------------------------------  No significant changes to PMH, PSH, FHx, SHx, unless otherwise noted    ALLERGIES & MEDICATIONS  --------------------------------------------------------------------------------  Allergies    No Known Allergies    Intolerances      Standing Inpatient Medications  aspirin enteric coated 81 milliGRAM(s) Oral daily  docusate sodium 100 milliGRAM(s) Oral two times a day  famotidine    Tablet 20 milliGRAM(s) Oral daily  furosemide Infusion 15 mG/Hr IV Continuous <Continuous>  gabapentin 300 milliGRAM(s) Oral two times a day  insulin lispro (HumaLOG) corrective regimen sliding scale   SubCutaneous four times a day before meals  mycophenolate mofetil 1000 milliGRAM(s) Oral two times a day  nystatin    Suspension [id number] Unit(s) Swish and Swallow four times a day  predniSONE   Tablet 10 milliGRAM(s) Oral every 12 hours  senna 2 Tablet(s) Oral at bedtime  tacrolimus 4 milliGRAM(s) Oral every 12 hours  tamsulosin 0.4 milliGRAM(s) Oral at bedtime  trimethoprim   80 mG/sulfamethoxazole 400 mG 1 Tablet(s) Oral daily  valGANciclovir 900 milliGRAM(s) Oral daily    PRN Inpatient Medications  acetaminophen   Tablet. 325 milliGRAM(s) Oral every 4 hours PRN  oxyCODONE    IR 10 milliGRAM(s) Oral every 4 hours PRN  oxyCODONE    IR 5 milliGRAM(s) Oral every 3 hours PRN      REVIEW OF SYSTEMS  --------------------------------------------------------------------------------  Gen: no fatigue, fevers/chills  Respiratory: No dyspnea, cough  CV: No chest pain, orthopnea  GI: No abdominal pain, diarrhea, constipation, nausea, vomiting  : No dysuria  MSK: +edema  Neuro: no confusion    VITALS/PHYSICAL EXAM  --------------------------------------------------------------------------------  T(C): 36.9 (07-22-18 @ 09:00), Max: 37 (07-21-18 @ 16:00)  HR: 95 (07-22-18 @ 09:00) (86 - 97)  BP: 123/70 (07-22-18 @ 09:00) (123/70 - 156/89)  RR: 18 (07-22-18 @ 09:00) (18 - 18)  SpO2: 94% (07-22-18 @ 09:00) (94% - 98%)  Wt(kg): --        07-21-18 @ 07:01  -  07-22-18 @ 07:00  --------------------------------------------------------  IN: 972.5 mL / OUT: 660 mL / NET: 312.5 mL    07-22-18 @ 07:01  -  07-22-18 @ 12:53  --------------------------------------------------------  IN: 840 mL / OUT: 300 mL / NET: 540 mL      Physical Exam:  	Gen: NAD, obese male, sitting in chair  	Pulm: CTA B/L  	CV: RRR, S1S2; no rub  	Abd: +BS, soft, nontender/nondistended; obese abdomen  	: +Velez with light-red urine noted  	UE: Warm, FROM, no edema  	LE: Warm, +RLE edema, trace LLE edema  	Neuro: follows commands  	Psych: Normal affect and mood  	Skin: Warm, without rashes  	Vascular access:  LUE AVF +thrill +bruit +skin intact    LABS/STUDIES  --------------------------------------------------------------------------------              8.4    5.9   >-----------<  118      [07-22-18 @ 05:38]              24.9     138  |  100  |  49  ----------------------------<  131      [07-22-18 @ 05:38]  3.5   |  24  |  10.68        Ca     8.2     [07-22-18 @ 05:38]      Mg     2.1     [07-22-18 @ 05:38]      Phos  5.7     [07-22-18 @ 05:38]            Creatinine Trend:  SCr 10.68 [07-22 @ 05:38]  SCr 12.91 [07-21 @ 06:06]  SCr 10.30 [07-20 @ 05:50]  SCr 9.73 [07-19 @ 14:57]  SCr 8.57 [07-19 @ 02:37]      Urine Creatinine 12      [07-18-18 @ 09:30]  Urine Sodium 143      [07-18-18 @ 09:30]  Urine Osmolality 306      [07-18-18 @ 09:30]    HbA1c 6.0      [07-17-18 @ 16:53]    HBsAb 5.6      [07-18-18 @ 19:20]  HBsAg Nonreact      [07-18-18 @ 19:20]  HBcAb Nonreact      [07-18-18 @ 19:20]  HCV 0.27, Nonreact      [07-18-18 @ 19:20]

## 2018-07-22 NOTE — PROGRESS NOTE ADULT - ATTENDING COMMENTS
I have seen and examined the patient with Dr. Nix and agree with her a/p mentioned in her note.     Dialyzed 7/21. No fluid removed. urine output remains poor but improved. continues on Lasix gtt. IS per transplant surgery. rest per fellow note

## 2018-07-22 NOTE — PROGRESS NOTE ADULT - ASSESSMENT
67 year old male with a PMH of ESRD on HD (2015), DM (not on insulin), HTN, bilateral nephrectomy for renal malignancy (2015), was  followed with outpatient nephrologist Dr. Bonnie COCHRAN [facility], now POD #4 HCV + donor DDRT with delayed graft function making minimal urine s/p HD on 7/18 and HD on 7/21, started on lasix drip on 7/20 with minimal affect. 67 year old male with a PMH of ESRD on HD (2015), DM (not on insulin), HTN, bilateral nephrectomy for renal malignancy (2015), was followed with outpatient nephrologist Dr. Bonnie COCHRAN [facility], now s/p HCV + donor DDRT with delayed graft function making minimal urine s/p HD on 7/18 and HD on 7/21, started on lasix drip on 7/20 with minimal affect.

## 2018-07-22 NOTE — PROGRESS NOTE ADULT - SUBJECTIVE AND OBJECTIVE BOX
INTERVAL HPI/OVERNIGHT EVENTS:  Patient seen with multidisciplinary team (Nephrologist, pharmacist, nurse, nurse manager, NP, MD surgeons, transplant coordinator)  in am rounds and examined with Dr. Yao.  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 Recipient CMV-/EBV+, CPRA 0%, did not make urine pre-op.  Donor 39 y/o male KDPI 30%. CIT 22 hours.  Transferred to SICU postop for hypotension and was briefly on Raymond-Synephrine and levophed. Post-op renal duplex completed demonstrating good flow and no KEREN was trendfered to 6 archie on 7/19. No acute event overnight, making minimal urine 140ml. S/P HD 7/18 and 7/22, doing well, on lasix drip since 7/20 with minimal response, NAD.     MEDICATIONS  (STANDING):  aspirin enteric coated 81 milliGRAM(s) Oral daily  docusate sodium 100 milliGRAM(s) Oral two times a day  famotidine    Tablet 20 milliGRAM(s) Oral daily  furosemide Infusion 15 mG/Hr (7.5 mL/Hr) IV Continuous <Continuous>  gabapentin 300 milliGRAM(s) Oral two times a day  insulin lispro (HumaLOG) corrective regimen sliding scale   SubCutaneous four times a day before meals  mycophenolate mofetil 1000 milliGRAM(s) Oral two times a day  nystatin    Suspension [id number] Unit(s) Swish and Swallow four times a day  predniSONE   Tablet 10 milliGRAM(s) Oral every 12 hours  senna 2 Tablet(s) Oral at bedtime  tacrolimus 4 milliGRAM(s) Oral every 12 hours  tamsulosin 0.4 milliGRAM(s) Oral at bedtime  trimethoprim   80 mG/sulfamethoxazole 400 mG 1 Tablet(s) Oral daily  valGANciclovir 900 milliGRAM(s) Oral daily    MEDICATIONS  (PRN):  acetaminophen   Tablet. 325 milliGRAM(s) Oral every 4 hours PRN Mild Pain (1 - 3)  oxyCODONE    IR 10 milliGRAM(s) Oral every 4 hours PRN Severe Pain (7 - 10)  oxyCODONE    IR 5 milliGRAM(s) Oral every 3 hours PRN Moderate Pain (4 - 6)    Allergies    No Known Allergies    Intolerances      Vital Signs Last 24 Hrs  T(C): 36.9 (07-22-18 @ 09:00), Max: 37 (07-21-18 @ 16:00)  HR: 95 (07-22-18 @ 09:00) (86 - 97)  BP: 123/70 (07-22-18 @ 09:00) (123/70 - 156/89)  RR: 18 (07-22-18 @ 09:00) (18 - 18)  SpO2: 94% (07-22-18 @ 09:00) (94% - 98%)    LABS:                          8.4    5.9   )-----------( 118      ( 22 Jul 2018 05:38 )             24.9       07-22    138  |  100  |  49<H>  ----------------------------<  131<H>  3.5   |  24  |  10.68<H>    Ca    8.2<L>      22 Jul 2018 05:38  Phos  5.7     07-22  Mg     2.1     07-22    POCT Blood Glucose.: 140 mg/dL (22 Jul 2018 07:47)      RADIOLOGY & ADDITIONAL TESTS:    Review of systems  Gen: No weight changes, fatigue, fevers/chills, weakness  Skin: No rashes  Head/Eyes/Ears/Mouth: No headache; Normal hearing; Normal vision w/o blurriness; No sinus pain/discomfort, sore throat  Respiratory: No dyspnea, cough, wheezing, hemoptysis  CV: No chest pain, PND, orthopnea  GI: Reports incision pain and TTP. Denies diarrhea, constipation, nausea, vomiting, melena, hematochezia  : Velez in situ  MSK: No joint pain/swelling; no back pain; no edema  Neuro: No dizziness/lightheadedness, weakness, seizures, numbness, tingling  Heme: No easy bruising or bleeding  Endo: No heat/cold intolerance  Psych: No significant nervousness, anxiety, stress, depression  All other systems were reviewed and are negative, except as noted.    PHYSICAL EXAM:  Constitutional: Well developed / well nourished  Eyes: Anicteric, PERRLA  ENMT: nc/at  Neck: RT IJ TLC in place no erythema or sign of infection noted  Respiratory: CTA B/L  Cardiovascular: RRR  Gastrointestinal: Soft abdomen, mild tender to touch at surgical site, ND.   Genitourinary: Urinary catheter in place  Extremities: SCD's in place and working bilaterally  Vascular: Palpable dp pulses bilaterally, no edema  Neurological: A&O x3  Skin: Mild serosanguinous azeem on wound dressing  Musculoskeletal: Moving all extremities  Psychiatric: Responsive

## 2018-07-23 LAB
ANION GAP SERPL CALC-SCNC: 20 MMOL/L — HIGH (ref 5–17)
BUN SERPL-MCNC: 62 MG/DL — HIGH (ref 7–23)
CALCIUM SERPL-MCNC: 7.9 MG/DL — LOW (ref 8.4–10.5)
CHLORIDE SERPL-SCNC: 96 MMOL/L — SIGNIFICANT CHANGE UP (ref 96–108)
CO2 SERPL-SCNC: 20 MMOL/L — LOW (ref 22–31)
CREAT SERPL-MCNC: 11.34 MG/DL — HIGH (ref 0.5–1.3)
GLUCOSE BLDC GLUCOMTR-MCNC: 121 MG/DL — HIGH (ref 70–99)
GLUCOSE BLDC GLUCOMTR-MCNC: 155 MG/DL — HIGH (ref 70–99)
GLUCOSE BLDC GLUCOMTR-MCNC: 194 MG/DL — HIGH (ref 70–99)
GLUCOSE BLDC GLUCOMTR-MCNC: 205 MG/DL — HIGH (ref 70–99)
GLUCOSE SERPL-MCNC: 150 MG/DL — HIGH (ref 70–99)
HCT VFR BLD CALC: 25.6 % — LOW (ref 39–50)
HCV RNA SERPL NAA DL=5-ACNC: 57 IU/ML — HIGH
HCV RNA SPEC NAA+PROBE-LOG IU: 1.76 LOGIU/ML — HIGH
HGB BLD-MCNC: 8.4 G/DL — LOW (ref 13–17)
MAGNESIUM SERPL-MCNC: 2 MG/DL — SIGNIFICANT CHANGE UP (ref 1.6–2.6)
MCHC RBC-ENTMCNC: 33 GM/DL — SIGNIFICANT CHANGE UP (ref 32–36)
MCHC RBC-ENTMCNC: 33.5 PG — SIGNIFICANT CHANGE UP (ref 27–34)
MCV RBC AUTO: 101 FL — HIGH (ref 80–100)
PHOSPHATE SERPL-MCNC: 6.7 MG/DL — HIGH (ref 2.5–4.5)
PLATELET # BLD AUTO: 151 K/UL — SIGNIFICANT CHANGE UP (ref 150–400)
POTASSIUM SERPL-MCNC: 3.7 MMOL/L — SIGNIFICANT CHANGE UP (ref 3.5–5.3)
POTASSIUM SERPL-SCNC: 3.7 MMOL/L — SIGNIFICANT CHANGE UP (ref 3.5–5.3)
RBC # BLD: 2.52 M/UL — LOW (ref 4.2–5.8)
RBC # FLD: 15.9 % — HIGH (ref 10.3–14.5)
SODIUM SERPL-SCNC: 136 MMOL/L — SIGNIFICANT CHANGE UP (ref 135–145)
TACROLIMUS SERPL-MCNC: 5.6 NG/ML — SIGNIFICANT CHANGE UP
WBC # BLD: 6.3 K/UL — SIGNIFICANT CHANGE UP (ref 3.8–10.5)
WBC # FLD AUTO: 6.3 K/UL — SIGNIFICANT CHANGE UP (ref 3.8–10.5)

## 2018-07-23 PROCEDURE — 99232 SBSQ HOSP IP/OBS MODERATE 35: CPT | Mod: GC

## 2018-07-23 RX ORDER — TACROLIMUS 5 MG/1
1 CAPSULE ORAL ONCE
Qty: 0 | Refills: 0 | Status: COMPLETED | OUTPATIENT
Start: 2018-07-23 | End: 2018-07-23

## 2018-07-23 RX ORDER — TACROLIMUS 5 MG/1
5 CAPSULE ORAL
Qty: 0 | Refills: 0 | Status: DISCONTINUED | OUTPATIENT
Start: 2018-07-23 | End: 2018-07-24

## 2018-07-23 RX ORDER — GABAPENTIN 400 MG/1
300 CAPSULE ORAL DAILY
Qty: 0 | Refills: 0 | Status: DISCONTINUED | OUTPATIENT
Start: 2018-07-23 | End: 2018-07-25

## 2018-07-23 RX ADMIN — Medication 4: at 21:14

## 2018-07-23 RX ADMIN — TACROLIMUS 5 MILLIGRAM(S): 5 CAPSULE ORAL at 17:58

## 2018-07-23 RX ADMIN — TACROLIMUS 4 MILLIGRAM(S): 5 CAPSULE ORAL at 06:05

## 2018-07-23 RX ADMIN — Medication 4: at 08:00

## 2018-07-23 RX ADMIN — MYCOPHENOLATE MOFETIL 1000 MILLIGRAM(S): 250 CAPSULE ORAL at 06:05

## 2018-07-23 RX ADMIN — FAMOTIDINE 20 MILLIGRAM(S): 10 INJECTION INTRAVENOUS at 11:57

## 2018-07-23 RX ADMIN — VALGANCICLOVIR 900 MILLIGRAM(S): 450 TABLET, FILM COATED ORAL at 11:56

## 2018-07-23 RX ADMIN — Medication 5 MILLIGRAM(S): at 17:58

## 2018-07-23 RX ADMIN — Medication 100 MILLIGRAM(S): at 06:05

## 2018-07-23 RX ADMIN — GABAPENTIN 300 MILLIGRAM(S): 400 CAPSULE ORAL at 06:05

## 2018-07-23 RX ADMIN — Medication 500000 UNIT(S): at 23:47

## 2018-07-23 RX ADMIN — Medication 5 MILLIGRAM(S): at 06:05

## 2018-07-23 RX ADMIN — Medication 1 TABLET(S): at 11:56

## 2018-07-23 RX ADMIN — Medication 500000 UNIT(S): at 06:05

## 2018-07-23 RX ADMIN — TAMSULOSIN HYDROCHLORIDE 0.4 MILLIGRAM(S): 0.4 CAPSULE ORAL at 21:14

## 2018-07-23 RX ADMIN — Medication 500000 UNIT(S): at 17:58

## 2018-07-23 RX ADMIN — Medication 500000 UNIT(S): at 00:33

## 2018-07-23 RX ADMIN — Medication 7.5 MG/HR: at 06:06

## 2018-07-23 RX ADMIN — SENNA PLUS 2 TABLET(S): 8.6 TABLET ORAL at 21:14

## 2018-07-23 RX ADMIN — Medication 500000 UNIT(S): at 11:57

## 2018-07-23 RX ADMIN — Medication 8: at 11:56

## 2018-07-23 RX ADMIN — TACROLIMUS 1 MILLIGRAM(S): 5 CAPSULE ORAL at 14:18

## 2018-07-23 RX ADMIN — Medication 100 MILLIGRAM(S): at 17:57

## 2018-07-23 RX ADMIN — MYCOPHENOLATE MOFETIL 1000 MILLIGRAM(S): 250 CAPSULE ORAL at 17:58

## 2018-07-23 RX ADMIN — Medication 81 MILLIGRAM(S): at 11:56

## 2018-07-23 NOTE — CHART NOTE - NSCHARTNOTEFT_GEN_A_CORE
Pt seen for nutrition follow up S/P kidney transplant, as per departmental protocol.     Source: Patient [X ]    Family [ ]     other [X ]; medical record    Hospital Course: 66 yo male with PMH of ESRD on HD (2015), DM, HTN, bilateral nephrectomy (2015) for renal carcinoma S/P DDRT on 7/18. Post-op course complicated by SICU admission 2/2 hypotension, S/P Solu-medrol & Simulect induction, and making urine in the setting of lasix drip & S/P HD last on (7/21)    Pt noted with 1220ml urine output in past 24-hours; current UO = 140-300ml/hr. Magnesium, sodium, potassium WNL and phosphorus elevated.     Diet : Consistent CHO+Snack, Renal Diet  Pt endorsed fair appetite & PO intakes during this admission. States he is experiencing abdominal fullness but is forcing himself to eat his meals or at the least the protein provided on his trays. Denied any nausea/emesis. Reports last BM on (7/22), encouraged hydration & ambulation as feasible to assist with BMs. Pt verbalized understanding  Reviewed food safety guidelines for transplant recipients with good teach back of material previously taught noted. Reviewed recommendations to avoid grapefruit, pomegranate and star fruit while taking immunosuppressant medication. Reviewed recommendations for moderate intake of sodium and carbohydrates with transplant medications. Pt was receptive and expressed understanding. Provided 2nd copy of nutrition handout, USDA Food Safety for Transplant Recipients booklet, as per request. Briefly reviewed recommendations for DM management including importance of monitoring portion sizes of CHOs at meals, limiting intake of concentrated sweets, avoiding sweetened beverages, and including protein at meals/snacks to promote BG control.       Current Weight: (7/23) 253 pounds. Variable weights ranging from 234.3 to 255 pounds noted, ?accuracy of admit weight 282.1 pounds  Edema: 1+ right ankle edema    Pertinent Medications: MEDICATIONS  (STANDING):  aspirin enteric coated 81 milliGRAM(s) Oral daily  docusate sodium 100 milliGRAM(s) Oral two times a day  famotidine    Tablet 20 milliGRAM(s) Oral daily  furosemide Infusion 15 mG/Hr (7.5 mL/Hr) IV Continuous <Continuous>  gabapentin 300 milliGRAM(s) Oral daily  insulin lispro (HumaLOG) corrective regimen sliding scale   SubCutaneous four times a day before meals  mycophenolate mofetil 1000 milliGRAM(s) Oral two times a day  nystatin    Suspension 825580 Unit(s) Swish and Swallow four times a day  predniSONE   Tablet 5 milliGRAM(s) Oral every 12 hours  senna 2 Tablet(s) Oral at bedtime  tacrolimus 4 milliGRAM(s) Oral every 12 hours  tamsulosin 0.4 milliGRAM(s) Oral at bedtime  trimethoprim   80 mG/sulfamethoxazole 400 mG 1 Tablet(s) Oral daily  valGANciclovir 900 milliGRAM(s) Oral daily    MEDICATIONS  (PRN):  acetaminophen   Tablet. 325 milliGRAM(s) Oral every 4 hours PRN Mild Pain (1 - 3)  oxyCODONE    IR 10 milliGRAM(s) Oral every 4 hours PRN Severe Pain (7 - 10)  oxyCODONE    IR 5 milliGRAM(s) Oral every 3 hours PRN Moderate Pain (4 - 6)    Pertinent Labs:  (7/23) POCT , CO2 20L, BUN 62H, Cr 11.34H, BG 150H, Ca 7.9L, Phos 6.7H; (7/22) POCT -234    Skin: no pressure injuries    Estimated Needs:   [X] no change since previous assessment  [ ] recalculated:     Previous Nutrition Diagnosis:   [X] Increased Nutrient Needs    Nutrition Diagnosis is [X ] ongoing; addressed with PO diet    New Nutrition Diagnosis: [X ] not applicable    Interventions:     Recommend:  1) Continue current diet; encourage intake of high protein and nutrient dense foods   2) Monitor weight, lab values, skin, po intake and GI tolerance  3) Reinforce therapeutic diet education as able    Monitoring and Evaluation:   Follow up per protocol  RD to remain available for further nutritional interventions as indicated.   Judith Mcgee, [facility] Pager #[phone]

## 2018-07-23 NOTE — PROGRESS NOTE ADULT - ATTENDING COMMENTS
agree with above. doing well. improving urine output. may still require dialysis. immunosuppression fk, MMF and steroids. will keep armenta till tomorrow

## 2018-07-23 NOTE — PROGRESS NOTE ADULT - ASSESSMENT
67 year old male with a PMH of ESRD on HD (2015), DM (not on insulin), HTN, bilateral nephrectomy for renal malignancy (2015), was  followed with outpatient nephrologist Dr. 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

## 2018-07-23 NOTE — PROGRESS NOTE ADULT - ATTENDING COMMENTS
CHELA of renal transplant - non oliguric on lasix drip  May need dialysis by tomorrow as pt much above EDW.   Cont immunosuppression

## 2018-07-23 NOTE — PROGRESS NOTE ADULT - SUBJECTIVE AND OBJECTIVE BOX
[facility] DIVISION OF KIDNEY DISEASES AND HYPERTENSION -- FOLLOW UP NOTE  --------------------------------------------------------------------------------    HPI: 67 year old male with a PMH of ESRD on HD (2015), DM, HTN, bilateral nephrectomy (2015) admitted for possible DDRT. Pt s/p DDRT (HEP C + DONOR) with CIT of 23 hours done on 7/17/18. Induction with Solumedrol and Simulect. Pt usually has HD MWF and follows with outpatient nephrologist Dr. Webb. Pt was transferred to SICU post-op due to low BP. Pt BP improved with some IV pressor support however has remained off IV pressor support and was transferred to the medical floor on 7/19/18. Pt had his last session of HD on 7/21. Pt seen and examined. Pt non-oliguric and remains on lasix. Pt denies CP, SOB or LE edema.      PAST HISTORY  --------------------------------------------------------------------------------  No significant changes to PMH, PSH, FHx, SHx, unless otherwise noted    ALLERGIES & MEDICATIONS  --------------------------------------------------------------------------------  Allergies    No Known Allergies    Intolerances      Standing Inpatient Medications  aspirin enteric coated 81 milliGRAM(s) Oral daily  docusate sodium 100 milliGRAM(s) Oral two times a day  famotidine    Tablet 20 milliGRAM(s) Oral daily  furosemide Infusion 15 mG/Hr IV Continuous <Continuous>  gabapentin 300 milliGRAM(s) Oral two times a day  insulin lispro (HumaLOG) corrective regimen sliding scale   SubCutaneous four times a day before meals  mycophenolate mofetil 1000 milliGRAM(s) Oral two times a day  nystatin    Suspension 934857 Unit(s) Swish and Swallow four times a day  predniSONE   Tablet 5 milliGRAM(s) Oral every 12 hours  senna 2 Tablet(s) Oral at bedtime  tacrolimus 4 milliGRAM(s) Oral every 12 hours  tamsulosin 0.4 milliGRAM(s) Oral at bedtime  trimethoprim   80 mG/sulfamethoxazole 400 mG 1 Tablet(s) Oral daily  valGANciclovir 900 milliGRAM(s) Oral daily    PRN Inpatient Medications  acetaminophen   Tablet. 325 milliGRAM(s) Oral every 4 hours PRN  oxyCODONE    IR 10 milliGRAM(s) Oral every 4 hours PRN  oxyCODONE    IR 5 milliGRAM(s) Oral every 3 hours PRN      REVIEW OF SYSTEMS  --------------------------------------------------------------------------------  Gen: No weakness  Skin: No rashes  Head/Eyes/Ears/Mouth: No headache  Respiratory: No dyspnea  CV: No chest pain, PND, orthopnea  GI: No abdominal pain  : No increased frequency  MSK: No edema  Neuro: No dizziness/lightheadedness  Heme: No easy bruising or bleeding      All other systems were reviewed and are negative, except as noted.    VITALS/PHYSICAL EXAM  --------------------------------------------------------------------------------  T(C): 36.9 (07-23-18 @ 09:12), Max: 37.1 (07-23-18 @ 05:21)  HR: 85 (07-23-18 @ 09:12) (85 - 98)  BP: 128/74 (07-23-18 @ 09:12) (124/70 - 148/80)  RR: 18 (07-23-18 @ 09:12) (18 - 18)  SpO2: 99% (07-23-18 @ 09:12) (97% - 99%)  Wt(kg): --    07-22-18 @ 07:01  -  07-23-18 @ 07:00  --------------------------------------------------------  IN: 1942.5 mL / OUT: 1220 mL / NET: 722.5 mL    Physical Exam:  	Gen: NAD  	Pulm: CTA B/L  	CV: RRR, S1S2; no rub  	Abd: +BS, soft, nontender/nondistended + midline healed scar              Transplant: RLQ incision, no bleeding   	UE: Warm, no asterixis  	LE: Warm, no edema  	Psych: Normal affect and mood  	Skin: Warm, without rashes  	Vascular access:  L AVF     LABS/STUDIES  --------------------------------------------------------------------------------              8.4    6.3   >-----------<  151      [07-23-18 @ 05:53]              25.6     136  |  96  |  62  ----------------------------<  150      [07-23-18 @ 05:53]  3.7   |  20  |  11.34        Ca     7.9     [07-23-18 @ 05:53]      Mg     2.0     [07-23-18 @ 05:53]      Phos  6.7     [07-23-18 @ 05:53]    Creatinine Trend:  SCr 11.34 [07-23 @ 05:53]  SCr 10.68 [07-22 @ 05:38]  SCr 12.91 [07-21 @ 06:06]  SCr 10.30 [07-20 @ 05:50]  SCr 9.73 [07-19 @ 14:57]    Urine Creatinine 12      [07-18-18 @ 09:30]  Urine Sodium 143      [07-18-18 @ 09:30]  Urine Osmolality 306      [07-18-18 @ 09:30]    HbA1c 6.0      [07-17-18 @ 16:53]    HBsAb 5.6      [07-18-18 @ 19:20]  HBsAg Nonreact      [07-18-18 @ 19:20]  HBcAb Nonreact      [07-18-18 @ 19:20]  HCV 0.27, Nonreact      [07-18-18 @ 19:20]

## 2018-07-23 NOTE — PROGRESS NOTE ADULT - SUBJECTIVE AND OBJECTIVE BOX
INTERVAL HPI/OVERNIGHT EVENTS:  Patient seen with multidisciplinary team (Nephrologist, pharmacist, nurse, nurse manager, NP, MD surgeons, transplant cordinator,  nutritionist, and  )  in am rounds and examined with Dr. Yao.  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 Recipient CMV-/EBV+, CPRA 0%, did not make urine pre-op.  Donor 38 y/o male KDPI 30%. CIT 22 hours.  Transferred to SICU postop for hypotension and was briefly on Raymond-Synephrine and levophed. Post-op renal duplex completed demonstrating good flow and no KEREN was trensfered to [address] [place] [address] (7/19).  No acute event overnight making urine in the setting of lasix drip S/P HD 7/21, doing well, NAD.    MEDICATIONS  (STANDING):  aspirin enteric coated 81 milliGRAM(s) Oral daily  docusate sodium 100 milliGRAM(s) Oral two times a day  famotidine    Tablet 20 milliGRAM(s) Oral daily  furosemide Infusion 15 mG/Hr (7.5 mL/Hr) IV Continuous <Continuous>  gabapentin 300 milliGRAM(s) Oral daily  insulin lispro (HumaLOG) corrective regimen sliding scale   SubCutaneous four times a day before meals  mycophenolate mofetil 1000 milliGRAM(s) Oral two times a day  nystatin    Suspension 229029 Unit(s) Swish and Swallow four times a day  predniSONE   Tablet 5 milliGRAM(s) Oral every 12 hours  senna 2 Tablet(s) Oral at bedtime  tacrolimus 4 milliGRAM(s) Oral every 12 hours  tamsulosin 0.4 milliGRAM(s) Oral at bedtime  trimethoprim   80 mG/sulfamethoxazole 400 mG 1 Tablet(s) Oral daily  valGANciclovir 900 milliGRAM(s) Oral daily    MEDICATIONS  (PRN):  acetaminophen   Tablet. 325 milliGRAM(s) Oral every 4 hours PRN Mild Pain (1 - 3)  oxyCODONE    IR 10 milliGRAM(s) Oral every 4 hours PRN Severe Pain (7 - 10)  oxyCODONE    IR 5 milliGRAM(s) Oral every 3 hours PRN Moderate Pain (4 - 6)      Allergies    No Known Allergies    Intolerances        Vital Signs Last 24 Hrs  T(C): 36.9 (23 Jul 2018 09:12), Max: 37.1 (23 Jul 2018 05:21)  T(F): 98.4 (23 Jul 2018 09:12), Max: 98.8 (23 Jul 2018 05:21)  HR: 85 (23 Jul 2018 09:12) (85 - 98)  BP: 128/74 (23 Jul 2018 09:12) (124/70 - 148/80)  BP(mean): --  RR: 18 (23 Jul 2018 09:12) (18 - 18)  SpO2: 99% (23 Jul 2018 09:12) (97% - 99%)    LABS:                        8.4    6.3   )-----------( 151      ( 23 Jul 2018 05:53 )             25.6     07-23    136  |  96  |  62<H>  ----------------------------<  150<H>  3.7   |  20<L>  |  11.34<H>    Ca    7.9<L>      23 Jul 2018 05:53  Phos  6.7     07-23  Mg     2.0     07-23  RADIOLOGY & ADDITIONAL TESTS:       Review of systems  Gen: No weight changes, fatigue, fevers/chills, weakness  Skin: No rashes  Head/Eyes/Ears/Mouth: No headache; Normal hearing; Normal vision w/o blurriness; No sinus pain/discomfort, sore throat  Respiratory: No dyspnea, cough, wheezing, hemoptysis  CV: No chest pain, PND, orthopnea  GI: Reports incision pain and TTP. Denies diarrhea, constipation, nausea, vomiting, melena, hematochezia  : Velez in situ  MSK: No joint pain/swelling; no back pain; no edema  Neuro: No dizziness/lightheadedness, weakness, seizures, numbness, tingling  Heme: No easy bruising or bleeding  Endo: No heat/cold intolerance  Psych: No significant nervousness, anxiety, stress, depression  All other systems were reviewed and are negative, except as noted.    PHYSICAL EXAM:  Constitutional: Well developed / well nourished  Eyes: Anicteric, PERRLA  ENMT: nc/at  Neck: RT IJ TLC in place no erythema or sign of infection noted  Respiratory: CTA B/L  Cardiovascular: RRR  Gastrointestinal: Soft abdomen, mild tender to touch at surgical site, ND.   Genitourinary: Urinary catheter in place  Extremities: SCD's in place and working bilaterally  Vascular: Palpable dp pulses bilaterally, no edema  Neurological: A&O x3  Skin: Mild serosanguinous azeem on wound dressing  Musculoskeletal: Moving all extremities  Psychiatric: Responsive

## 2018-07-24 LAB
ANION GAP SERPL CALC-SCNC: 19 MMOL/L — HIGH (ref 5–17)
BUN SERPL-MCNC: 71 MG/DL — HIGH (ref 7–23)
CALCIUM SERPL-MCNC: 7.6 MG/DL — LOW (ref 8.4–10.5)
CHLORIDE SERPL-SCNC: 95 MMOL/L — LOW (ref 96–108)
CO2 SERPL-SCNC: 22 MMOL/L — SIGNIFICANT CHANGE UP (ref 22–31)
CREAT SERPL-MCNC: 12.61 MG/DL — HIGH (ref 0.5–1.3)
GLUCOSE BLDC GLUCOMTR-MCNC: 168 MG/DL — HIGH (ref 70–99)
GLUCOSE BLDC GLUCOMTR-MCNC: 173 MG/DL — HIGH (ref 70–99)
GLUCOSE BLDC GLUCOMTR-MCNC: 190 MG/DL — HIGH (ref 70–99)
GLUCOSE BLDC GLUCOMTR-MCNC: 232 MG/DL — HIGH (ref 70–99)
GLUCOSE SERPL-MCNC: 176 MG/DL — HIGH (ref 70–99)
HCT VFR BLD CALC: 22.9 % — LOW (ref 39–50)
HGB BLD-MCNC: 7.8 G/DL — LOW (ref 13–17)
MAGNESIUM SERPL-MCNC: 1.9 MG/DL — SIGNIFICANT CHANGE UP (ref 1.6–2.6)
MCHC RBC-ENTMCNC: 34.1 GM/DL — SIGNIFICANT CHANGE UP (ref 32–36)
MCHC RBC-ENTMCNC: 34.4 PG — HIGH (ref 27–34)
MCV RBC AUTO: 101 FL — HIGH (ref 80–100)
PHOSPHATE SERPL-MCNC: 7.6 MG/DL — HIGH (ref 2.5–4.5)
PLATELET # BLD AUTO: 142 K/UL — LOW (ref 150–400)
POTASSIUM SERPL-MCNC: 3.7 MMOL/L — SIGNIFICANT CHANGE UP (ref 3.5–5.3)
POTASSIUM SERPL-SCNC: 3.7 MMOL/L — SIGNIFICANT CHANGE UP (ref 3.5–5.3)
RBC # BLD: 2.27 M/UL — LOW (ref 4.2–5.8)
RBC # FLD: 15.3 % — HIGH (ref 10.3–14.5)
SODIUM SERPL-SCNC: 136 MMOL/L — SIGNIFICANT CHANGE UP (ref 135–145)
TACROLIMUS SERPL-MCNC: 6.9 NG/ML — SIGNIFICANT CHANGE UP
WBC # BLD: 5.2 K/UL — SIGNIFICANT CHANGE UP (ref 3.8–10.5)
WBC # FLD AUTO: 5.2 K/UL — SIGNIFICANT CHANGE UP (ref 3.8–10.5)

## 2018-07-24 PROCEDURE — 99233 SBSQ HOSP IP/OBS HIGH 50: CPT | Mod: GC

## 2018-07-24 RX ORDER — FUROSEMIDE 40 MG
10 TABLET ORAL
Qty: 500 | Refills: 0 | Status: DISCONTINUED | OUTPATIENT
Start: 2018-07-24 | End: 2018-07-25

## 2018-07-24 RX ORDER — TACROLIMUS 5 MG/1
6 CAPSULE ORAL
Qty: 0 | Refills: 0 | Status: DISCONTINUED | OUTPATIENT
Start: 2018-07-24 | End: 2018-07-25

## 2018-07-24 RX ADMIN — Medication 500000 UNIT(S): at 06:01

## 2018-07-24 RX ADMIN — Medication 4: at 08:04

## 2018-07-24 RX ADMIN — Medication 500000 UNIT(S): at 18:25

## 2018-07-24 RX ADMIN — Medication 100 MILLIGRAM(S): at 18:26

## 2018-07-24 RX ADMIN — Medication 1 TABLET(S): at 12:10

## 2018-07-24 RX ADMIN — Medication 4: at 17:22

## 2018-07-24 RX ADMIN — Medication 7.5 MG/HR: at 06:00

## 2018-07-24 RX ADMIN — Medication 5 MG/HR: at 12:07

## 2018-07-24 RX ADMIN — Medication 5 MILLIGRAM(S): at 06:01

## 2018-07-24 RX ADMIN — Medication 8: at 12:11

## 2018-07-24 RX ADMIN — SENNA PLUS 2 TABLET(S): 8.6 TABLET ORAL at 21:52

## 2018-07-24 RX ADMIN — TAMSULOSIN HYDROCHLORIDE 0.4 MILLIGRAM(S): 0.4 CAPSULE ORAL at 21:52

## 2018-07-24 RX ADMIN — MYCOPHENOLATE MOFETIL 1000 MILLIGRAM(S): 250 CAPSULE ORAL at 18:25

## 2018-07-24 RX ADMIN — TACROLIMUS 5 MILLIGRAM(S): 5 CAPSULE ORAL at 06:01

## 2018-07-24 RX ADMIN — Medication 81 MILLIGRAM(S): at 12:10

## 2018-07-24 RX ADMIN — FAMOTIDINE 20 MILLIGRAM(S): 10 INJECTION INTRAVENOUS at 12:10

## 2018-07-24 RX ADMIN — GABAPENTIN 300 MILLIGRAM(S): 400 CAPSULE ORAL at 12:10

## 2018-07-24 RX ADMIN — TACROLIMUS 6 MILLIGRAM(S): 5 CAPSULE ORAL at 18:26

## 2018-07-24 RX ADMIN — Medication 4: at 21:52

## 2018-07-24 RX ADMIN — Medication 100 MILLIGRAM(S): at 06:01

## 2018-07-24 RX ADMIN — VALGANCICLOVIR 900 MILLIGRAM(S): 450 TABLET, FILM COATED ORAL at 12:10

## 2018-07-24 RX ADMIN — MYCOPHENOLATE MOFETIL 1000 MILLIGRAM(S): 250 CAPSULE ORAL at 06:01

## 2018-07-24 RX ADMIN — Medication 500000 UNIT(S): at 12:10

## 2018-07-24 NOTE — PROGRESS NOTE ADULT - ATTENDING COMMENTS
PO day 7. doing well. now with good urine output. 1800cc in past 24 hrs. creatinine still high but expect to start trending down. will reduce lasix to 10 mg/hr and remove armenta. immunosuppression FK, MMF and steroids.

## 2018-07-24 NOTE — PROGRESS NOTE ADULT - ASSESSMENT
67 year old male with a PMH of ESRD on HD (2015), DM (not on insulin), HTN, bilateral nephrectomy for renal malignancy (2015), was  followed with outpatient nephrologist Dr. 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  Discharge planning in progress. 67 year old male with a PMH of ESRD on HD (2015), DM (not on insulin), HTN, bilateral nephrectomy for renal malignancy (2015), was  followed with outpatient nephrologist Dr. 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  Discharge planning in progress.

## 2018-07-24 NOTE — PROGRESS NOTE ADULT - SUBJECTIVE AND OBJECTIVE BOX
Learner: Patient  Barriers: None    Patient received a renal transplant on 7/17/18    Method used: Verbal discussion and written material    Medication safety was discussed with the patient: allergies, herbals, adherence, interactions, medication precautions, miss dose instructions, purpose, side effects, signs and symptoms to report, and storage & handling    Patient was able to repeat and verbalize key points    Education Summary: Discharge immunosuppressant medications and prophylatic anti-infective agents reviewed with the patient. Outpatient medication schedule was discussed in detail including: medication name, indication, dose, administration times, treatment duration, side effects, drug interactions, and special instructions. Patient questions and concerns were answered and addressed. Patient demonstrated understanding.    Time spent discharge education: 60 minutes    Renal Transplant medications:  Tacrolimus adjusted to trough   Mycophenolate mofetil 1g BID  Prednisone taper to 5mg daily  Sulfamethoxazole/Trimethoprim 1SS daily  Nystatin swish and swallow four times a day  Valganciclovir 900 mg daily	  Pepcid 20 mg daily  Docusate and Senna  Other meds: aspirin 81 mg daily, gabapentin 300 mg daily, flomax 0.4 mg daily

## 2018-07-24 NOTE — PROGRESS NOTE ADULT - SUBJECTIVE AND OBJECTIVE BOX
INTERVAL HPI/OVERNIGHT EVENTS:  Patient seen with multidisciplinary team (Nephrologist, pharmacist, nurse, nurse manager, NP, MD surgeons, transplant coordinator,  nutritionist, and  )  in am rounds and examined with Dr. Yao.  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 Recipient CMV-/EBV+, CPRA 0%, did not make urine pre-op.  Donor 40 y/o male KDPI 30%. CIT 22 hours.  Transferred to SICU postop for hypotension and was briefly on Raymond-Synephrine and levophed. Post-op renal duplex completed demonstrating good flow and no KEREN was transferred to [address] [place] [address] (7/19).  No acute event overnight making urine in the setting of lasix drip S/P HD 7/21, doing well, NAD.    MEDICATIONS  (STANDING):  aspirin enteric coated 81 milliGRAM(s) Oral daily  docusate sodium 100 milliGRAM(s) Oral two times a day  famotidine    Tablet 20 milliGRAM(s) Oral daily  furosemide Infusion 10 mG/Hr (5 mL/Hr) IV Continuous <Continuous>  gabapentin 300 milliGRAM(s) Oral daily  insulin lispro (HumaLOG) corrective regimen sliding scale   SubCutaneous four times a day before meals  mycophenolate mofetil 1000 milliGRAM(s) Oral two times a day  nystatin    Suspension 006605 Unit(s) Swish and Swallow four times a day  predniSONE   Tablet 5 milliGRAM(s) Oral daily  senna 2 Tablet(s) Oral at bedtime  tacrolimus 5 milliGRAM(s) Oral two times a day  tamsulosin 0.4 milliGRAM(s) Oral at bedtime  trimethoprim   80 mG/sulfamethoxazole 400 mG 1 Tablet(s) Oral daily  valGANciclovir 900 milliGRAM(s) Oral daily    MEDICATIONS  (PRN):  acetaminophen   Tablet. 325 milliGRAM(s) Oral every 4 hours PRN Mild Pain (1 - 3)  oxyCODONE    IR 10 milliGRAM(s) Oral every 4 hours PRN Severe Pain (7 - 10)  oxyCODONE    IR 5 milliGRAM(s) Oral every 3 hours PRN Moderate Pain (4 - 6)      Allergies    No Known Allergies    Intolerances        Vital Signs Last 24 Hrs  T(C): 36.8 (24 Jul 2018 09:35), Max: 37.1 (24 Jul 2018 05:19)  T(F): 98.3 (24 Jul 2018 09:35), Max: 98.7 (24 Jul 2018 05:19)  HR: 92 (24 Jul 2018 09:35) (82 - 100)  BP: 122/67 (24 Jul 2018 09:35) (122/67 - 142/75)  BP(mean): --  RR: 18 (24 Jul 2018 09:35) (18 - 18)  SpO2: 97% (24 Jul 2018 09:35) (96% - 100%)    LABS:                        7.8    5.2   )-----------( 142      ( 24 Jul 2018 06:13 )             22.9     07-24    136  |  95<L>  |  71<H>  ----------------------------<  176<H>  3.7   |  22  |  12.61<H>    Ca    7.6<L>      24 Jul 2018 06:13  Phos  7.6     07-24  Mg     1.9     07-24            RADIOLOGY & ADDITIONAL TESTS:          Review of systems  Gen: No weight changes, fatigue, fevers/chills, weakness  Skin: No rashes  Head/Eyes/Ears/Mouth: No headache; Normal hearing; Normal vision w/o blurriness; No sinus pain/discomfort, sore throat  Respiratory: No dyspnea, cough, wheezing, hemoptysis  CV: No chest pain, PND, orthopnea  GI: Reports incision pain and TTP. Denies diarrhea, constipation, nausea, vomiting, melena, hematochezia  : Velez in situ  MSK: No joint pain/swelling; no back pain; no edema  Neuro: No dizziness/lightheadedness, weakness, seizures, numbness, tingling  Heme: No easy bruising or bleeding  Endo: No heat/cold intolerance  Psych: No significant nervousness, anxiety, stress, depression  All other systems were reviewed and are negative, except as noted.    PHYSICAL EXAM:  Constitutional: Well developed / well nourished  Eyes: Anicteric, PERRLA  ENMT: nc/at  Neck: RT IJ TLC in place no erythema or sign of infection noted  Respiratory: CTA B/L  Cardiovascular: RRR  Gastrointestinal: Soft abdomen, mild tender to touch at surgical site, ND.   Genitourinary: Urinary catheter in place  Extremities: SCD's in place and working bilaterally  Vascular: Palpable dp pulses bilaterally, no edema  Neurological: A&O x3  Skin: Mild serosanguinous azeem on wound dressing  Musculoskeletal: Moving all extremities  Psychiatric: Responsive

## 2018-07-24 NOTE — PROGRESS NOTE ADULT - SUBJECTIVE AND OBJECTIVE BOX
[facility] DIVISION OF KIDNEY DISEASES AND HYPERTENSION -- FOLLOW UP NOTE  --------------------------------------------------------------------------------    HPI: 67 year old male with a PMH of ESRD on HD (2015), DM, HTN, bilateral nephrectomy (2015) admitted for possible DDRT. Pt s/p DDRT (HEP C + DONOR) with CIT of 23 hours done on 7/17/18. Induction with Solumedrol and Simulect. Pt usually has HD MWF and follows with outpatient nephrologist Dr. Webb. Pt was transferred to SICU post-op due to low BP. Pt BP improved with some IV pressor support however has remained off IV pressor support and was transferred to the medical floor on 7/19/18. Pt had his last session of HD on 7/21. Pt seen and examined. Pt non-oliguric and remains on lasix. Pt with good urine output overnight. Pt feels well. Pt denies CP, SOB or LE edema.      PAST HISTORY  --------------------------------------------------------------------------------  No significant changes to PMH, PSH, FHx, SHx, unless otherwise noted    ALLERGIES & MEDICATIONS  --------------------------------------------------------------------------------  Allergies    No Known Allergies    Intolerances      Standing Inpatient Medications  aspirin enteric coated 81 milliGRAM(s) Oral daily  docusate sodium 100 milliGRAM(s) Oral two times a day  famotidine    Tablet 20 milliGRAM(s) Oral daily  furosemide Infusion 10 mG/Hr IV Continuous <Continuous>  gabapentin 300 milliGRAM(s) Oral daily  insulin lispro (HumaLOG) corrective regimen sliding scale   SubCutaneous four times a day before meals  mycophenolate mofetil 1000 milliGRAM(s) Oral two times a day  nystatin    Suspension 974780 Unit(s) Swish and Swallow four times a day  predniSONE   Tablet 5 milliGRAM(s) Oral daily  senna 2 Tablet(s) Oral at bedtime  tacrolimus 5 milliGRAM(s) Oral two times a day  tamsulosin 0.4 milliGRAM(s) Oral at bedtime  trimethoprim   80 mG/sulfamethoxazole 400 mG 1 Tablet(s) Oral daily  valGANciclovir 900 milliGRAM(s) Oral daily    PRN Inpatient Medications  acetaminophen   Tablet. 325 milliGRAM(s) Oral every 4 hours PRN  oxyCODONE    IR 10 milliGRAM(s) Oral every 4 hours PRN  oxyCODONE    IR 5 milliGRAM(s) Oral every 3 hours PRN      REVIEW OF SYSTEMS  --------------------------------------------------------------------------------  Gen: No weakness  Skin: No rashes  Head/Eyes/Ears/Mouth: No headache  Respiratory: No dyspnea  CV: No chest pain, PND, orthopnea  GI: No abdominal pain  : No increased frequency  MSK: No edema  Neuro: No dizziness/lightheadedness  Heme: No easy bruising or bleeding    All other systems were reviewed and are negative, except as noted.    VITALS/PHYSICAL EXAM  --------------------------------------------------------------------------------  T(C): 36.8 (07-24-18 @ 09:35), Max: 37.1 (07-24-18 @ 05:19)  HR: 92 (07-24-18 @ 09:35) (82 - 100)  BP: 122/67 (07-24-18 @ 09:35) (122/67 - 142/75)  RR: 18 (07-24-18 @ 09:35) (18 - 18)  SpO2: 97% (07-24-18 @ 09:35) (96% - 100%)  Wt(kg): --    07-23-18 @ 07:01  -  07-24-18 @ 07:00  --------------------------------------------------------  IN: 980 mL / OUT: 1820 mL / NET: -840 mL    07-24-18 @ 07:01  -  07-24-18 @ 11:33  --------------------------------------------------------  IN: 987.5 mL / OUT: 700 mL / NET: 287.5 mL    Physical Exam:  	Gen: NAD  	Pulm: CTA B/L  	CV: RRR, S1S2; no rub  	Abd: +BS, soft, nontender/nondistended + midline healed scar              Transplant: RLQ incision, no bleeding   	UE: Warm, no asterixis  	LE: Warm, no edema  	Psych: Normal affect and mood  	Skin: Warm, without rashes  	Vascular access:  L AVF       LABS/STUDIES  --------------------------------------------------------------------------------              7.8    5.2   >-----------<  142      [07-24-18 @ 06:13]              22.9     136  |  95  |  71  ----------------------------<  176      [07-24-18 @ 06:13]  3.7   |  22  |  12.61        Ca     7.6     [07-24-18 @ 06:13]      Mg     1.9     [07-24-18 @ 06:13]      Phos  7.6     [07-24-18 @ 06:13]    Creatinine Trend:  SCr 12.61 [07-24 @ 06:13]  SCr 11.34 [07-23 @ 05:53]  SCr 10.68 [07-22 @ 05:38]  SCr 12.91 [07-21 @ 06:06]  SCr 10.30 [07-20 @ 05:50]    Urine Creatinine 12      [07-18-18 @ 09:30]  Urine Sodium 143      [07-18-18 @ 09:30]  Urine Osmolality 306      [07-18-18 @ 09:30]    HbA1c 6.0      [07-17-18 @ 16:53]    HBsAb 5.6      [07-18-18 @ 19:20]  HBsAg Nonreact      [07-18-18 @ 19:20]  HBcAb Nonreact      [07-18-18 @ 19:20]  HCV 0.27, Nonreact      [07-18-18 @ 19:20]

## 2018-07-24 NOTE — PROGRESS NOTE ADULT - ATTENDING COMMENTS
Patient with renal transplant, DGF, now non oliguric.  Plan:  monitor urine output, Tac level  Will follow  Hold dialysis today

## 2018-07-25 ENCOUNTER — TRANSCRIPTION ENCOUNTER (OUTPATIENT)
Age: 68
End: 2018-07-25

## 2018-07-25 VITALS
DIASTOLIC BLOOD PRESSURE: 86 MMHG | OXYGEN SATURATION: 95 % | HEART RATE: 90 BPM | RESPIRATION RATE: 18 BRPM | WEIGHT: 245.15 LBS | SYSTOLIC BLOOD PRESSURE: 139 MMHG | TEMPERATURE: 99 F

## 2018-07-25 LAB
ALBUMIN SERPL ELPH-MCNC: 3.4 G/DL — SIGNIFICANT CHANGE UP (ref 3.3–5)
ALP SERPL-CCNC: 64 U/L — SIGNIFICANT CHANGE UP (ref 40–120)
ALT FLD-CCNC: 16 U/L — SIGNIFICANT CHANGE UP (ref 10–45)
ANION GAP SERPL CALC-SCNC: 15 MMOL/L — SIGNIFICANT CHANGE UP (ref 5–17)
ANION GAP SERPL CALC-SCNC: 18 MMOL/L — HIGH (ref 5–17)
AST SERPL-CCNC: 31 U/L — SIGNIFICANT CHANGE UP (ref 10–40)
BILIRUB SERPL-MCNC: 0.4 MG/DL — SIGNIFICANT CHANGE UP (ref 0.2–1.2)
BUN SERPL-MCNC: 46 MG/DL — HIGH (ref 7–23)
BUN SERPL-MCNC: 76 MG/DL — HIGH (ref 7–23)
CALCIUM SERPL-MCNC: 7.9 MG/DL — LOW (ref 8.4–10.5)
CALCIUM SERPL-MCNC: 8.3 MG/DL — LOW (ref 8.4–10.5)
CHLORIDE SERPL-SCNC: 96 MMOL/L — SIGNIFICANT CHANGE UP (ref 96–108)
CHLORIDE SERPL-SCNC: 97 MMOL/L — SIGNIFICANT CHANGE UP (ref 96–108)
CO2 SERPL-SCNC: 22 MMOL/L — SIGNIFICANT CHANGE UP (ref 22–31)
CO2 SERPL-SCNC: 26 MMOL/L — SIGNIFICANT CHANGE UP (ref 22–31)
CREAT SERPL-MCNC: 13.71 MG/DL — HIGH (ref 0.5–1.3)
CREAT SERPL-MCNC: 9.45 MG/DL — HIGH (ref 0.5–1.3)
GLUCOSE BLDC GLUCOMTR-MCNC: 113 MG/DL — HIGH (ref 70–99)
GLUCOSE BLDC GLUCOMTR-MCNC: 145 MG/DL — HIGH (ref 70–99)
GLUCOSE BLDC GLUCOMTR-MCNC: 225 MG/DL — HIGH (ref 70–99)
GLUCOSE SERPL-MCNC: 115 MG/DL — HIGH (ref 70–99)
GLUCOSE SERPL-MCNC: 117 MG/DL — HIGH (ref 70–99)
HCT VFR BLD CALC: 22.8 % — LOW (ref 39–50)
HCT VFR BLD CALC: 26.1 % — LOW (ref 39–50)
HCV GENTYP BLD NAA+PROBE: ABNORMAL
HCV RNA SERPL NAA DL=5-ACNC: HIGH IU/ML
HCV RNA SPEC NAA+PROBE-LOG IU: 6.45 LOGIU/ML — HIGH
HGB BLD-MCNC: 7.5 G/DL — LOW (ref 13–17)
HGB BLD-MCNC: 8.3 G/DL — LOW (ref 13–17)
MAGNESIUM SERPL-MCNC: 1.8 MG/DL — SIGNIFICANT CHANGE UP (ref 1.6–2.6)
MAGNESIUM SERPL-MCNC: 1.8 MG/DL — SIGNIFICANT CHANGE UP (ref 1.6–2.6)
MCHC RBC-ENTMCNC: 31.8 GM/DL — LOW (ref 32–36)
MCHC RBC-ENTMCNC: 32.1 PG — SIGNIFICANT CHANGE UP (ref 27–34)
MCHC RBC-ENTMCNC: 33.1 GM/DL — SIGNIFICANT CHANGE UP (ref 32–36)
MCHC RBC-ENTMCNC: 33.3 PG — SIGNIFICANT CHANGE UP (ref 27–34)
MCV RBC AUTO: 101 FL — HIGH (ref 80–100)
MCV RBC AUTO: 101 FL — HIGH (ref 80–100)
PHOSPHATE SERPL-MCNC: 4.5 MG/DL — SIGNIFICANT CHANGE UP (ref 2.5–4.5)
PHOSPHATE SERPL-MCNC: 7.9 MG/DL — HIGH (ref 2.5–4.5)
PLATELET # BLD AUTO: 144 K/UL — LOW (ref 150–400)
PLATELET # BLD AUTO: 168 K/UL — SIGNIFICANT CHANGE UP (ref 150–400)
POTASSIUM SERPL-MCNC: 3.3 MMOL/L — LOW (ref 3.5–5.3)
POTASSIUM SERPL-MCNC: 3.6 MMOL/L — SIGNIFICANT CHANGE UP (ref 3.5–5.3)
POTASSIUM SERPL-SCNC: 3.3 MMOL/L — LOW (ref 3.5–5.3)
POTASSIUM SERPL-SCNC: 3.6 MMOL/L — SIGNIFICANT CHANGE UP (ref 3.5–5.3)
PROT SERPL-MCNC: 6.2 G/DL — SIGNIFICANT CHANGE UP (ref 6–8.3)
RBC # BLD: 2.26 M/UL — LOW (ref 4.2–5.8)
RBC # BLD: 2.59 M/UL — LOW (ref 4.2–5.8)
RBC # FLD: 15.4 % — HIGH (ref 10.3–14.5)
RBC # FLD: 16 % — HIGH (ref 10.3–14.5)
SODIUM SERPL-SCNC: 137 MMOL/L — SIGNIFICANT CHANGE UP (ref 135–145)
SODIUM SERPL-SCNC: 137 MMOL/L — SIGNIFICANT CHANGE UP (ref 135–145)
TACROLIMUS SERPL-MCNC: 6.8 NG/ML — SIGNIFICANT CHANGE UP
WBC # BLD: 5 K/UL — SIGNIFICANT CHANGE UP (ref 3.8–10.5)
WBC # BLD: 5.7 K/UL — SIGNIFICANT CHANGE UP (ref 3.8–10.5)
WBC # FLD AUTO: 5 K/UL — SIGNIFICANT CHANGE UP (ref 3.8–10.5)
WBC # FLD AUTO: 5.7 K/UL — SIGNIFICANT CHANGE UP (ref 3.8–10.5)

## 2018-07-25 PROCEDURE — 86706 HEP B SURFACE ANTIBODY: CPT

## 2018-07-25 PROCEDURE — 85610 PROTHROMBIN TIME: CPT

## 2018-07-25 PROCEDURE — 86901 BLOOD TYPING SEROLOGIC RH(D): CPT

## 2018-07-25 PROCEDURE — 82435 ASSAY OF BLOOD CHLORIDE: CPT

## 2018-07-25 PROCEDURE — 84295 ASSAY OF SERUM SODIUM: CPT

## 2018-07-25 PROCEDURE — 83605 ASSAY OF LACTIC ACID: CPT

## 2018-07-25 PROCEDURE — 85014 HEMATOCRIT: CPT

## 2018-07-25 PROCEDURE — P9047: CPT

## 2018-07-25 PROCEDURE — 84132 ASSAY OF SERUM POTASSIUM: CPT

## 2018-07-25 PROCEDURE — 87522 HEPATITIS C REVRS TRNSCRPJ: CPT

## 2018-07-25 PROCEDURE — 83935 ASSAY OF URINE OSMOLALITY: CPT

## 2018-07-25 PROCEDURE — 87340 HEPATITIS B SURFACE AG IA: CPT

## 2018-07-25 PROCEDURE — 82570 ASSAY OF URINE CREATININE: CPT

## 2018-07-25 PROCEDURE — 85730 THROMBOPLASTIN TIME PARTIAL: CPT

## 2018-07-25 PROCEDURE — 82803 BLOOD GASES ANY COMBINATION: CPT

## 2018-07-25 PROCEDURE — 84100 ASSAY OF PHOSPHORUS: CPT

## 2018-07-25 PROCEDURE — 86803 HEPATITIS C AB TEST: CPT

## 2018-07-25 PROCEDURE — 99232 SBSQ HOSP IP/OBS MODERATE 35: CPT | Mod: GC

## 2018-07-25 PROCEDURE — 82330 ASSAY OF CALCIUM: CPT

## 2018-07-25 PROCEDURE — 76776 US EXAM K TRANSPL W/DOPPLER: CPT

## 2018-07-25 PROCEDURE — 83735 ASSAY OF MAGNESIUM: CPT

## 2018-07-25 PROCEDURE — 83036 HEMOGLOBIN GLYCOSYLATED A1C: CPT

## 2018-07-25 PROCEDURE — 86850 RBC ANTIBODY SCREEN: CPT

## 2018-07-25 PROCEDURE — 85027 COMPLETE CBC AUTOMATED: CPT

## 2018-07-25 PROCEDURE — 84300 ASSAY OF URINE SODIUM: CPT

## 2018-07-25 PROCEDURE — 93005 ELECTROCARDIOGRAM TRACING: CPT

## 2018-07-25 PROCEDURE — 80053 COMPREHEN METABOLIC PANEL: CPT

## 2018-07-25 PROCEDURE — 86900 BLOOD TYPING SEROLOGIC ABO: CPT

## 2018-07-25 PROCEDURE — 83615 LACTATE (LD) (LDH) ENZYME: CPT

## 2018-07-25 PROCEDURE — 82947 ASSAY GLUCOSE BLOOD QUANT: CPT

## 2018-07-25 PROCEDURE — C2617: CPT

## 2018-07-25 PROCEDURE — 86704 HEP B CORE ANTIBODY TOTAL: CPT

## 2018-07-25 PROCEDURE — 99261: CPT

## 2018-07-25 PROCEDURE — 82565 ASSAY OF CREATININE: CPT

## 2018-07-25 PROCEDURE — 71045 X-RAY EXAM CHEST 1 VIEW: CPT

## 2018-07-25 PROCEDURE — 82962 GLUCOSE BLOOD TEST: CPT

## 2018-07-25 PROCEDURE — 80048 BASIC METABOLIC PNL TOTAL CA: CPT

## 2018-07-25 PROCEDURE — 80197 ASSAY OF TACROLIMUS: CPT

## 2018-07-25 PROCEDURE — 87902 NFCT AGT GNTYP ALYS HEP C: CPT

## 2018-07-25 RX ORDER — TACROLIMUS 5 MG/1
6 CAPSULE ORAL
Qty: 0 | Refills: 0 | COMMUNITY
Start: 2018-07-25

## 2018-07-25 RX ORDER — SITAGLIPTIN 50 MG/1
1 TABLET, FILM COATED ORAL
Qty: 0 | Refills: 0 | COMMUNITY

## 2018-07-25 RX ORDER — SENNA PLUS 8.6 MG/1
2 TABLET ORAL
Qty: 0 | Refills: 0 | COMMUNITY
Start: 2018-07-25

## 2018-07-25 RX ORDER — FAMOTIDINE 10 MG/ML
1 INJECTION INTRAVENOUS
Qty: 0 | Refills: 0 | DISCHARGE
Start: 2018-07-25

## 2018-07-25 RX ORDER — MYCOPHENOLATE MOFETIL 250 MG/1
0 CAPSULE ORAL
Qty: 0 | Refills: 0 | COMMUNITY
Start: 2018-07-25

## 2018-07-25 RX ORDER — TAMSULOSIN HYDROCHLORIDE 0.4 MG/1
1 CAPSULE ORAL
Qty: 0 | Refills: 0 | DISCHARGE
Start: 2018-07-25

## 2018-07-25 RX ORDER — VALGANCICLOVIR 450 MG/1
2 TABLET, FILM COATED ORAL
Qty: 0 | Refills: 0 | COMMUNITY
Start: 2018-07-25

## 2018-07-25 RX ORDER — SEVELAMER CARBONATE 2400 MG/1
1 POWDER, FOR SUSPENSION ORAL
Qty: 0 | Refills: 0 | COMMUNITY

## 2018-07-25 RX ORDER — NYSTATIN 500MM UNIT
5 POWDER (EA) MISCELLANEOUS
Qty: 0 | Refills: 0 | COMMUNITY
Start: 2018-07-25

## 2018-07-25 RX ORDER — ACETAMINOPHEN 500 MG
1 TABLET ORAL
Qty: 0 | Refills: 0 | DISCHARGE
Start: 2018-07-25

## 2018-07-25 RX ORDER — DOCUSATE SODIUM 100 MG
1 CAPSULE ORAL
Qty: 0 | Refills: 0 | COMMUNITY
Start: 2018-07-25

## 2018-07-25 RX ORDER — GABAPENTIN 400 MG/1
1 CAPSULE ORAL
Qty: 0 | Refills: 0 | COMMUNITY
Start: 2018-07-25

## 2018-07-25 RX ADMIN — Medication 100 MILLIGRAM(S): at 06:20

## 2018-07-25 RX ADMIN — Medication 5 MILLIGRAM(S): at 06:21

## 2018-07-25 RX ADMIN — Medication 8: at 12:09

## 2018-07-25 RX ADMIN — FAMOTIDINE 20 MILLIGRAM(S): 10 INJECTION INTRAVENOUS at 11:25

## 2018-07-25 RX ADMIN — TACROLIMUS 6 MILLIGRAM(S): 5 CAPSULE ORAL at 18:03

## 2018-07-25 RX ADMIN — MYCOPHENOLATE MOFETIL 1000 MILLIGRAM(S): 250 CAPSULE ORAL at 18:03

## 2018-07-25 RX ADMIN — TACROLIMUS 6 MILLIGRAM(S): 5 CAPSULE ORAL at 06:20

## 2018-07-25 RX ADMIN — Medication 500000 UNIT(S): at 06:21

## 2018-07-25 RX ADMIN — Medication 500000 UNIT(S): at 11:25

## 2018-07-25 RX ADMIN — Medication 1 TABLET(S): at 11:25

## 2018-07-25 RX ADMIN — VALGANCICLOVIR 900 MILLIGRAM(S): 450 TABLET, FILM COATED ORAL at 11:25

## 2018-07-25 RX ADMIN — Medication 500000 UNIT(S): at 18:03

## 2018-07-25 RX ADMIN — Medication 81 MILLIGRAM(S): at 11:25

## 2018-07-25 RX ADMIN — Medication 500000 UNIT(S): at 00:00

## 2018-07-25 RX ADMIN — MYCOPHENOLATE MOFETIL 1000 MILLIGRAM(S): 250 CAPSULE ORAL at 06:20

## 2018-07-25 RX ADMIN — GABAPENTIN 300 MILLIGRAM(S): 400 CAPSULE ORAL at 11:25

## 2018-07-25 NOTE — DISCHARGE NOTE ADULT - PATIENT PORTAL LINK FT
You can access the [facility] Patient Portal, offered by [facility], by registering with the following website: [email or web address]

## 2018-07-25 NOTE — PROGRESS NOTE ADULT - SUBJECTIVE AND OBJECTIVE BOX
INTERVAL HPI/OVERNIGHT EVENTS:  Patient seen with multidisciplinary team (Nephrologist, pharmacist, nurse, nurse manager, NP, MD surgeons, transplant cordinator,  nutritionist, and  )  in am rounds and examined with Dr. Yao.  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 Recipient CMV-/EBV+, CPRA 0%, did not make urine pre-op.  Donor 40 y/o male KDPI 30%. CIT 22 hours.  Transferred to SICU postop for hypotension and was briefly on Raymond-Synephrine and levophed. Post-op renal duplex completed demonstrating good flow and no KEREN was transferred to [address] [place] [address] (7/19).      No acute events overnight. Denies complaints. Lasix drip decreased from 10mg/hour to 5mg/hour. UOP 2550ml. Velez removed yesterday. Creatinine  13.71 from 12.61, K 3.3.      MEDICATIONS  (STANDING):  aspirin enteric coated 81 milliGRAM(s) Oral daily  docusate sodium 100 milliGRAM(s) Oral two times a day  famotidine    Tablet 20 milliGRAM(s) Oral daily  gabapentin 300 milliGRAM(s) Oral daily  insulin lispro (HumaLOG) corrective regimen sliding scale   SubCutaneous four times a day before meals  mycophenolate mofetil 1000 milliGRAM(s) Oral two times a day  nystatin    Suspension 120239 Unit(s) Swish and Swallow four times a day  predniSONE   Tablet 5 milliGRAM(s) Oral daily  senna 2 Tablet(s) Oral at bedtime  tacrolimus 6 milliGRAM(s) Oral two times a day  tamsulosin 0.4 milliGRAM(s) Oral at bedtime  trimethoprim   80 mG/sulfamethoxazole 400 mG 1 Tablet(s) Oral daily  valGANciclovir 900 milliGRAM(s) Oral daily    MEDICATIONS  (PRN):  acetaminophen   Tablet. 325 milliGRAM(s) Oral every 4 hours PRN Mild Pain (1 - 3)  oxyCODONE    IR 10 milliGRAM(s) Oral every 4 hours PRN Severe Pain (7 - 10)  oxyCODONE    IR 5 milliGRAM(s) Oral every 3 hours PRN Moderate Pain (4 - 6)      Allergies    No Known Allergies    Intolerances        Vital Signs Last 24 Hrs  T(C): 37.1 (25 Jul 2018 09:34), Max: 37.1 (24 Jul 2018 21:22)  T(F): 98.8 (25 Jul 2018 09:34), Max: 98.8 (24 Jul 2018 21:22)  HR: 94 (25 Jul 2018 09:34) (84 - 96)  BP: 115/61 (25 Jul 2018 09:34) (113/63 - 159/64)  BP(mean): --  RR: 18 (25 Jul 2018 09:34) (18 - 18)  SpO2: 94% (25 Jul 2018 09:34) (94% - 99%)    LABS:                        7.5    5.0   )-----------( 144      ( 25 Jul 2018 06:47 )             22.8     07-25    137  |  97  |  76<H>  ----------------------------<  117<H>  3.3<L>   |  22  |  13.71<H>    Ca    7.9<L>      25 Jul 2018 06:46  Phos  7.9     07-25  Mg     1.8     07-25            RADIOLOGY & ADDITIONAL TESTS:  Gen: No weight changes, fatigue, fevers/chills, weakness  Skin: No rashes  Head/Eyes/Ears/Mouth: No headache; Normal hearing; Normal vision w/o blurriness; No sinus pain/discomfort, sore throat  Respiratory: No dyspnea, cough, wheezing, hemoptysis  CV: No chest pain, PND, orthopnea  GI: Reports incision pain and TTP. Denies diarrhea, constipation, nausea, vomiting, melena, hematochezia  : voiding pink colored urine  MSK: No joint pain/swelling; no back pain; no edema  Neuro: No dizziness/lightheadedness, weakness, seizures, numbness, tingling  Heme: No easy bruising or bleeding  Endo: No heat/cold intolerance  Psych: No significant nervousness, anxiety, stress, depression  All other systems were reviewed and are negative, except as noted.    PHYSICAL EXAM:  Constitutional: Well developed / well nourished  Eyes: Anicteric, PERRLA  ENMT: nc/at  Neck: RT IJ TLC in place no erythema or sign of infection noted  Respiratory: CTA B/L  Cardiovascular: RRR  Gastrointestinal: Soft abdomen, mild tender to touch at surgical site, ND.   Genitourinary: voiding pink colored urine  Extremities: SCD's in place and working bilaterally  Vascular: Palpable dp pulses bilaterally, trace BLE edema  Neurological: A&O x3  Skin: scant amount serosanguanous drainage on abdominal dressing   Musculoskeletal: Moving all extremities  Psychiatric: Responsive

## 2018-07-25 NOTE — PROGRESS NOTE ADULT - PROBLEM SELECTOR PLAN 2
Continue Tacrolimus, cellcept, steroid taper, Simulect induction next dose on day 4, Nystatin S&S, bactrim, and Valcyte 900mg QD  F/U Tacrolimus level daily, tacro goal 8-10.
Pt with stable BP now off IV pressor Support. Monitor BP closely
Pt with stable BP without  BP medications. Avoid hypotension. Monitor BP closely
Continue Tacrolimus, Cellcept, steroid taper, Nystatin S&S, bactrim, and Valcyte 900mg QD  F/U Tacrolimus level daily, tacro goal 8-10.
Continue Tacrolimus, Cellcept, steroid taper, S/P Simulect induction, on Nystatin S&S, bactrim, and Valcyte 900mg QD  F/U Tacrolimus level daily, tacro goal 8-10
Continue Tacrolimus, cellcept, steroid taper, S/P Simulect induction, on Nystatin S&S, bactrim, and Valcyte 900mg QD  F/U Tacrolimus level daily, tacro goal 8-10.
Continue Tacrolimus, cellcept, steroid taper, Simulect induction next dose on day 4, Nystatin S&S, bactrim, and Valcyte 900mg QD  - Increased tac to 4 BID today.  F/U Tacrolimus level daily, tacro goal 8-10.
Continue Tacrolimus, cellcept, steroid taper, Simulect induction next dose on day 4, Nystatin S&S, bactrim, and Valcyte 900mg QD  F/U Tacrolimus level daily, tacro goal 8-10.  Send Hep-C genotype and PCR
Continue Tacrolimus, cellcept, steroid taper, Simulect induction next dose on day 4, Nystatin S&S, bactrim, and Valcyte 900mg QD  F/U Tacrolimus level daily, tacro goal 8-10.  Send Hep-C genotype and PCR in am with labs
Pt with stable BP now off IV pressor Support. Monitor BP closely
Pt with stable BP without BP medications. Avoid hypotension. Monitor BP closely
Continue Tacrolimus, cellcept, steroid taper, Simulect induction next dose on day 4, Nystatin S&S, bactrim, and Valcyte 900mg QD  F/U Tacrolimus level daily, tacro goal 8-10

## 2018-07-25 NOTE — PROGRESS NOTE ADULT - SUBJECTIVE AND OBJECTIVE BOX
[facility] DIVISION OF KIDNEY DISEASES AND HYPERTENSION -- FOLLOW UP NOTE  --------------------------------------------------------------------------------    HPI: 67 year old male with a PMH of ESRD on HD (2015), DM, HTN, bilateral nephrectomy (2015) admitted for possible DDRT. Pt s/p DDRT (HEP C + DONOR) with CIT of 23 hours done on 7/17/18. Induction with Solumedrol and Simulect. Pt usually has HD MWF and follows with outpatient nephrologist Dr. Webb. Pt was transferred to SICU post-op due to low BP. Pt BP improved with some IV pressor support however has remained off IV pressor support and was transferred to the medical floor on 7/19/18. Pt had his last session of HD on 7/21. Plan for HD today prior to discharge. Pt seen and examined. Pt non-oliguric and remains on lasix. Pt with good urine output overnight. Pt feels well. Pt denies CP, SOB or LE edema.      PAST HISTORY  --------------------------------------------------------------------------------  No significant changes to PMH, PSH, FHx, SHx, unless otherwise noted    ALLERGIES & MEDICATIONS  --------------------------------------------------------------------------------  Allergies    No Known Allergies    Intolerances      Standing Inpatient Medications  aspirin enteric coated 81 milliGRAM(s) Oral daily  docusate sodium 100 milliGRAM(s) Oral two times a day  famotidine    Tablet 20 milliGRAM(s) Oral daily  gabapentin 300 milliGRAM(s) Oral daily  insulin lispro (HumaLOG) corrective regimen sliding scale   SubCutaneous four times a day before meals  mycophenolate mofetil 1000 milliGRAM(s) Oral two times a day  nystatin    Suspension 550683 Unit(s) Swish and Swallow four times a day  predniSONE   Tablet 5 milliGRAM(s) Oral daily  senna 2 Tablet(s) Oral at bedtime  tacrolimus 6 milliGRAM(s) Oral two times a day  tamsulosin 0.4 milliGRAM(s) Oral at bedtime  trimethoprim   80 mG/sulfamethoxazole 400 mG 1 Tablet(s) Oral daily  valGANciclovir 900 milliGRAM(s) Oral daily    PRN Inpatient Medications  acetaminophen   Tablet. 325 milliGRAM(s) Oral every 4 hours PRN  oxyCODONE    IR 10 milliGRAM(s) Oral every 4 hours PRN  oxyCODONE    IR 5 milliGRAM(s) Oral every 3 hours PRN      REVIEW OF SYSTEMS  --------------------------------------------------------------------------------  Gen: No weakness  Skin: No rashes  Head/Eyes/Ears/Mouth: No headache  Respiratory: No dyspnea  CV: No chest pain, PND, orthopnea  GI: No abdominal pain  : No increased frequency  MSK: No edema  Neuro: No dizziness/lightheadedness  Heme: No easy bruising or bleeding    All other systems were reviewed and are negative, except as noted.    VITALS/PHYSICAL EXAM  --------------------------------------------------------------------------------  T(C): 37.1 (07-25-18 @ 12:05), Max: 37.1 (07-24-18 @ 21:22)  HR: 92 (07-25-18 @ 12:05) (84 - 94)  BP: 145/83 (07-25-18 @ 12:05) (113/63 - 145/83)  RR: 18 (07-25-18 @ 12:05) (18 - 18)  SpO2: 96% (07-25-18 @ 12:05) (94% - 99%)  Wt(kg): --      07-24-18 @ 07:01  -  07-25-18 @ 07:00  --------------------------------------------------------  IN: 2312.5 mL / OUT: 2550 mL / NET: -237.5 mL    07-25-18 @ 07:01  -  07-25-18 @ 14:47  --------------------------------------------------------  IN: 850 mL / OUT: 500 mL / NET: 350 mL    Physical Exam:  	Gen: NAD  	Pulm: CTA B/L  	CV: RRR, S1S2; no rub  	Abd: +BS, soft, nontender/nondistended + midline healed scar              Transplant: RLQ incision, no bleeding   	UE: Warm, no asterixis  	LE: Warm, no edema  	Psych: Normal affect and mood  	Skin: Warm, without rashes  	Vascular access:  L AVF     LABS/STUDIES  --------------------------------------------------------------------------------              7.5    5.0   >-----------<  144      [07-25-18 @ 06:47]              22.8     137  |  97  |  76  ----------------------------<  117      [07-25-18 @ 06:46]  3.3   |  22  |  13.71        Ca     7.9     [07-25-18 @ 06:46]      Mg     1.8     [07-25-18 @ 06:46]      Phos  7.9     [07-25-18 @ 06:46]    Creatinine Trend:  SCr 13.71 [07-25 @ 06:46]  SCr 12.61 [07-24 @ 06:13]  SCr 11.34 [07-23 @ 05:53]  SCr 10.68 [07-22 @ 05:38]  SCr 12.91 [07-21 @ 06:06]    HbA1c 6.0      [07-17-18 @ 16:53]    HBsAb 5.6      [07-18-18 @ 19:20]  HBsAg Nonreact      [07-18-18 @ 19:20]  HBcAb Nonreact      [07-18-18 @ 19:20]  HCV 0.27, Nonreact      [07-18-18 @ 19:20]

## 2018-07-25 NOTE — PROGRESS NOTE ADULT - ATTENDING COMMENTS
Pt POD 8  feels well but still with CHELA from ischemia reperfusion injury.  Creatinine rising.  Volume up with edema  dialysis today and d/c home on dialysis

## 2018-07-25 NOTE — DISCHARGE NOTE ADULT - MEDICATION SUMMARY - MEDICATIONS TO TAKE
I will START or STAY ON the medications listed below when I get home from the hospital:    predniSONE 5 mg oral tablet  -- 1 tab(s) by mouth once a day  -- Indication: For Immunosuppression    aspirin 81 mg oral tablet, chewable  -- 1 tab(s) by mouth once a day  -- Indication: For renal transplant reicpient    acetaminophen 325 mg oral tablet  -- 1 tab(s) by mouth every 4 hours, As needed, Mild Pain (1 - 3)  -- Indication: For pain    tamsulosin 0.4 mg oral capsule  -- 1 cap(s) by mouth once a day (at bedtime)  -- Indication: For renal transplant recipient    gabapentin 300 mg oral capsule  -- 1 cap(s) by mouth once a day  -- Indication: For Kidney transplant recipient    nystatin 100,000 units/mL oral suspension  -- 5 milliliter(s) by mouth 4 times a day  -- Indication: For prophylaxis    valGANciclovir 450 mg oral tablet  -- 2 tab(s) by mouth once a day  -- Indication: For prophylaxis    famotidine 20 mg oral tablet  -- 1 tab(s) by mouth once a day  -- Indication: For GI    mycophenolate mofetil 250 mg oral capsule  -- 1000mg by mouth two times daily  -- Indication: For Immunosuppression    tacrolimus 1 mg oral capsule  -- 6 cap(s) by mouth 2 times a day  -- Indication: For Immunosuppression    docusate sodium 100 mg oral capsule  -- 1 cap(s) by mouth 2 times a day  -- Indication: For GI    senna oral tablet  -- 2 tab(s) by mouth once a day (at bedtime)  -- Indication: For GI    sulfamethoxazole-trimethoprim 400 mg-80 mg oral tablet  -- 1 tab(s) by mouth once a day  -- Indication: For prophylaxis

## 2018-07-25 NOTE — DISCHARGE NOTE ADULT - PLAN OF CARE
to be free of infection and rejection No heavy lifting anything more than 10-15lbs or straining. Otherwise, you may return to your usual level of physical activity. If you are taking narcotic pain medication (such as Percocet), do NOT drive a car, operate machinery or make important decisions.  Call transplant clinic If you developed any of the following, fever, pain, redness, swelling at incision site, cough, nausea, vomiting, painful urination, difficulty urination, or not making any urine.  NOTIFY YOUR SURGEON IF: You have any bleeding that does not stop, any pus draining from your wound, any fever (over 100.4 F) or chills, persistent nausea/vomiting with inability to tolerate food or liquids, persistent diarrhea, or if your pain is not controlled on your discharge pain medications. Keep away from people who have cough, cold, and symptom of flu.  Only take medications that are on your discharge list  If you missed your medications call the transplant office, do not double up medication because you missed a dose.  If you have any question regarding your medication please call transplant office. PLease continue to take only the medications listed on your discharge paperwork and follow-up with your physician for long term management

## 2018-07-25 NOTE — PROGRESS NOTE ADULT - PROBLEM SELECTOR PLAN 3
Not on insulin at home  C/W SSI with fingersticks prior to meals and bedtime on steroid taper.
-DM, did not require insulin at home  - Stable fingerstick glucose on steroid taper.
Continue Tacrolimus, cellcept, steroid taper, S/P Simulect induction, on Nystatin S&S, bactrim, and Valcyte 900mg QD  F/U Tacrolimus level daily, tacro goal 8-10.
Not on insulin at home  C/W SSI with fingersticks prior to meals and bedtime on steroid taper.
Not on insulin at home  Start SSI with fingersticks prior tp meals and bedtime on steroid taper
DM, not on insulin at home  Start SSI with fingersticks prior tp meals and bedtime on steroid taper

## 2018-07-25 NOTE — DISCHARGE NOTE ADULT - CARE PROVIDERS DIRECT ADDRESSES
[email or web address]

## 2018-07-25 NOTE — PROGRESS NOTE ADULT - ASSESSMENT
67 year old male with a PMH of ESRD on HD (2015), DM (not on insulin), HTN, bilateral nephrectomy for renal malignancy (2015), was  followed with outpatient nephrologist Dr. Bonnie COCHRAN MW, now POD #8 HCV + donor DDRT with delayed graft function and good diuresis on lasix. Plan for discharge today

## 2018-07-25 NOTE — DISCHARGE NOTE ADULT - MEDICATION SUMMARY - MEDICATIONS TO STOP TAKING
I will STOP taking the medications listed below when I get home from the hospital:    Plavix 75 mg oral tablet  -- 1 tab(s) by mouth once a day    atenolol 25 mg oral tablet  -- 1 tab(s) by mouth once a day    Januvia 25 mg oral tablet  -- 1 tab(s) by mouth once a day    Renvela 800 mg oral tablet  -- 1 tab(s) by mouth 3 times a day (with meals)

## 2018-07-25 NOTE — PROGRESS NOTE ADULT - PROBLEM SELECTOR PLAN 1
On PRN Tylenol and Percocet for pain, Monitor and manage pain  Keep Velez in place with minimal hematouria  Increase lasix drip to 15mg/hr  Plan for HD today per renal  Continue bowel regimen, Colace, senna, PPI  RT TLC in place due to unable to get PIV access  On low dose aspirin 81 mg, flomax.  Monitor closely.
Pt s/p DDRT on 7/17/18 with DGF  Pt received solumedrol and Simulect induction. Pt now on tacrolimus, cellcept and IV steroid. Monitor 12 hour trough level.   Pt remains oliguric. Last HD was on 7/18/18. Monitor need for HD daily   Monitor urine output and BMP
Pt s/p DDRT on 7/17/18 with DGF  Pt received solumedrol and Simulect induction. Pt now on tacrolimus, cellcept and IV steroid. Monitor 12 hour trough level.   Pt remains oliguric. Last HD was on 7/18/18. No plan for HD today. Plan for trial of lasix.  Monitor need for HD daily   Monitor urine output and BMP
HD today. Previous HD 7/21  -Discontinue Lasix.   -Start Lasix 80mg BID for discharge today  -Continue bowel regimen, Colace, senna, PPI  -DC Central line today  -Continue Aspirin 81 mg, Flomax.  -FU Hep C geotype and RNA by PCR  -DC to home today after HD.  Plan for outpatient labs Friday 7/28.  Will notify patient of need for future HD. Will notify SW or POC
On PRN Tylenol and Percocet for pain, Monitor and manage pain  Keep Armenta in place   Decrease lasix drip to 10mg/hr and D/C armenta today monitor output hourly  Last HD 7/21/18  Continue bowel regimen, Colace, senna, PPI  RT TLC in place due to unable to get PIV access  On low dose aspirin 81 mg, Flomax.  Send hep C geotype nd RNA by PCR  Monitor closely.
On PRN Tylenol and Percocet for pain, Monitor and manage pain  Keep Velez in place    lasix drip to 15mg/hr  Last HD 7/21/18  Continue bowel regimen, Colace, senna, PPI  RT TLC in place due to unable to get PIV access  On low dose aspirin 81 mg, flomax.  Monitor closely.
On PRN Tylenol and Percocet for pain, Monitor and manage pain  Keep Velez in place with minimal hematoria  Continue IVF 70ml/hour   S/P HD yesterday  Continue bowel regimen, Colace, senna, PPI  Monitor closely.   Patient was on HD MWF and follows with outpatient nephrologist Dr. Webb.  Plan to D/C A-line and if obtain a PIV D/C TLC  D/C ALEXANDR today  Start low dose aspirin 81 mg , start flomax
On PRN Tylenol and Percocet for pain, Monitor and manage pain  Keep Velez in place with minimal hematouria  Lasix 100ml IV x1, start lasix drip at 5mg/hr  Continue IVF 70ml/hour   S/P HD yesterday  Continue bowel regimen, Colace, senna, PPI  Monitor closely.   Hold HD today in the setting of lasix  RT TLC in place  ALEXANDR d/c ed today  Start low dose aspirin 81 mg , start flomax.
On PRN Tylenol and Percocet for pain, Monitor and manage pain  Keep Velez in place with minimal hematuria  Lasix drip at 7.5mg/hr  HD on 7/18 and 7/21  Continue bowel regimen, Colace, senna, PPI  RT TLC in place due to unable to get PIV access  On low dose aspirin 81 mg, flomax.  Monitor closely.
Pt s/p DDRT on 7/17/18 with DGF. S/p solumedrol and Simulect induction. Pt last HD was on Saturday 7/21 with no UF. Pt non-oliguirc and remains on lasix drip.   - No plan for HD today. Will reassess daily.   - C/w tacrolimus, cellcept and steroids.   - Monitor 12 hour tacro trough level.   - Nystatin, Bactrim and valcyte for ppx.  - Monitor urine output and BMP daily.
Pt s/p DDRT on 7/17/18 with DGF. S/p solumedrol and Simulect induction. Pt last HD was on Saturday 7/21 with no UF. Pt nonoliguric and remains on lasix drip. Pt had good urine output overnight. Plan for armenta catheter removal today.   - No plan for HD today. Will reassess daily.   - C/w tacrolimus, cellcept and steroids.   - Monitor 12 hour tacro trough level.   - Nystatin, Bactrim and valcyte for ppx.  - Monitor urine output and BMP daily.
Pt s/p DDRT on 7/17/18 with DGF. S/p solumedrol and Simulect induction. Pt last HD was on Saturday 7/21 with no UF. Pt nonoliguric and remains on lasix drip. Pt had good urine output overnight. Pt with Scr trending up although non-oliguric. Plan for HD today prior to discharge. Pt does home hemodialysis. Will assess in office for HD plans  - C/w tacrolimus, cellcept and steroids.   - Monitor 12 hour tacro trough level.   - Nystatin, Bactrim and valcyte for ppx.  - Monitor urine output and BMP daily.
Pt s/p DDRT on 7/17/18 with DGF. S/p solumedrol and Simulect induction. S/p HD on Saturday 7/21 with no UF. Pt remains on lasix drip. Urine output improving.   - No urgent need for HD today. Will reassess daily.   - C/w tacrolimus, cellcept and steroids.   - Monitor 12 hour tacro trough level.   - Nystatin, Bactrim and valcyte for ppx.  - Monitor urine output and BMP daily.
Pt s/p DDRT on 7/17/18. Pt received solumedrol and Simulect induction. Pt now on tacrolimus, cellcept and IV steroid. Monitor 12 hour trough level.   Monitor urine output and BMP
Creatinine 10.53, K+ 4.9.  Send 12pm labs to assess for HD need  Start PRN Tylenol and Percocet for pain, Monitor and manage pain  Keep Velez in place  Continue IVF 70ml/hour   Continue bowel regimen, Colace, senna, PPI  Monitor closely.   Patient was on HD MWF and follows with outpatient nephrologist Dr. Webb

## 2018-07-25 NOTE — PROGRESS NOTE ADULT - ATTENDING COMMENTS
agree with above. post op day 8 with DGF. good urine output but creatinine still trending up. will discharge home today after dialysis and assess as outpatient need for dialysis. immunosuppression FK, MMF and steroids.

## 2018-07-25 NOTE — PROGRESS NOTE ADULT - PROBLEM SELECTOR PROBLEM 2
Immunosuppression
Hypertension, unspecified type
Immunosuppression

## 2018-07-25 NOTE — DISCHARGE NOTE ADULT - HOSPITAL COURSE
67 year old male s/p HCV + donor DDRT on 7/17/2018.  Recipient CMV-/EBV+, CPRA 0%. He was anuric pre-operative. Donor 38 y/o male KDPI 30%. CIT 22 hours.  Simulect induction. He was transferred to SICU postop for hypotension and was briefly on Raymond-Synephrine and levophed. A renal duplex demonstrated good flow and no KEREN.  His post-operative course was complicated by delayed graft function requiring 2 sessions of HD and Lasix infusion.  His urine output improved, armenta was removed and he was transitioned to oral Lasix 80mg BID.  His immunosuppression was optimized. He was tolerating regular diet, ambulating and had bowel function.  He was dialyzed on the day of discharge with plan to have outpatient labs in 2 days to guide decision for future HD.  He was  evaluated by the disciplinary team including surgeon, nephrologist, NP, pharmacist, nutrition, social work, and nursing and deemed stable for discharge.

## 2018-07-25 NOTE — PROGRESS NOTE ADULT - PROVIDER SPECIALTY LIST ADULT
Anesthesia
Nephrology
Pharmacy
SICU
Transplant Surgery
Nephrology
Nephrology
Transplant Surgery

## 2018-07-25 NOTE — DISCHARGE NOTE ADULT - ADDITIONAL INSTRUCTIONS
1. Please call to make a follow-up appointment with Dr. Castaneda this Friday July 28, 2018.  You will need to have lab work during this visit. You will be notified if you need to have dialysis based on these labs results  2. Please follow up with your primary care physician in one week regarding your hospitalization.

## 2018-07-25 NOTE — PROGRESS NOTE ADULT - PROBLEM SELECTOR PROBLEM 1
Kidney transplant recipient

## 2018-07-25 NOTE — DISCHARGE NOTE ADULT - CARE PROVIDER_API CALL
Adrián Castaneda (MD), Surgery  [address] [facility]  [address] [place] [address]  Phone: [phone]  Fax: [phone]    Arian Bhakta (DO), Nephrology  [address] [place] [address]  Phone: [phone]  Fax: [phone]

## 2018-07-25 NOTE — DISCHARGE NOTE ADULT - CARE PLAN
Principal Discharge DX:	Kidney transplant recipient  Goal:	to be free of infection and rejection  Assessment and plan of treatment:	No heavy lifting anything more than 10-15lbs or straining. Otherwise, you may return to your usual level of physical activity. If you are taking narcotic pain medication (such as Percocet), do NOT drive a car, operate machinery or make important decisions.  Call transplant clinic If you developed any of the following, fever, pain, redness, swelling at incision site, cough, nausea, vomiting, painful urination, difficulty urination, or not making any urine.  NOTIFY YOUR SURGEON IF: You have any bleeding that does not stop, any pus draining from your wound, any fever (over 100.4 F) or chills, persistent nausea/vomiting with inability to tolerate food or liquids, persistent diarrhea, or if your pain is not controlled on your discharge pain medications.  Secondary Diagnosis:	Immunosuppression  Assessment and plan of treatment:	Keep away from people who have cough, cold, and symptom of flu.  Only take medications that are on your discharge list  If you missed your medications call the transplant office, do not double up medication because you missed a dose.  If you have any question regarding your medication please call transplant office.  Secondary Diagnosis:	DM (diabetes mellitus)  Assessment and plan of treatment:	PLease continue to take only the medications listed on your discharge paperwork and follow-up with your physician for long term management

## 2018-07-26 ENCOUNTER — FORM ENCOUNTER (OUTPATIENT)
Age: 68
End: 2018-07-26

## 2018-07-27 ENCOUNTER — OUTPATIENT (OUTPATIENT)
Dept: OUTPATIENT SERVICES | Facility: HOSPITAL | Age: 68
LOS: 1 days | End: 2018-07-27
Payer: MEDICARE

## 2018-07-27 ENCOUNTER — APPOINTMENT (OUTPATIENT)
Dept: TRANSPLANT | Facility: CLINIC | Age: 68
End: 2018-07-27
Payer: MEDICARE

## 2018-07-27 VITALS
SYSTOLIC BLOOD PRESSURE: 110 MMHG | DIASTOLIC BLOOD PRESSURE: 60 MMHG | HEIGHT: 70 IN | BODY MASS INDEX: 34.79 KG/M2 | WEIGHT: 243 LBS | OXYGEN SATURATION: 90 % | HEART RATE: 95 BPM | TEMPERATURE: 98.4 F

## 2018-07-27 DIAGNOSIS — Z90.5 ACQUIRED ABSENCE OF KIDNEY: Chronic | ICD-10-CM

## 2018-07-27 DIAGNOSIS — I77.0 ARTERIOVENOUS FISTULA, ACQUIRED: Chronic | ICD-10-CM

## 2018-07-27 DIAGNOSIS — Z94.0 KIDNEY TRANSPLANT STATUS: ICD-10-CM

## 2018-07-27 PROCEDURE — 76776 US EXAM K TRANSPL W/DOPPLER: CPT | Mod: 26,RT

## 2018-07-27 PROCEDURE — XXXXX: CPT

## 2018-07-27 PROCEDURE — 76776 US EXAM K TRANSPL W/DOPPLER: CPT

## 2018-07-27 PROCEDURE — 99024 POSTOP FOLLOW-UP VISIT: CPT

## 2018-07-30 ENCOUNTER — APPOINTMENT (OUTPATIENT)
Dept: TRANSPLANT | Facility: CLINIC | Age: 68
End: 2018-07-30

## 2018-07-30 ENCOUNTER — LABORATORY RESULT (OUTPATIENT)
Age: 68
End: 2018-07-30

## 2018-07-30 LAB
ALBUMIN SERPL ELPH-MCNC: 3.3 G/DL
ALBUMIN SERPL ELPH-MCNC: 3.6 G/DL
ALP BLD-CCNC: 56 U/L
ALP BLD-CCNC: 62 U/L
ALT SERPL-CCNC: 12 U/L
ALT SERPL-CCNC: 24 U/L
ANION GAP SERPL CALC-SCNC: 18 MMOL/L
ANION GAP SERPL CALC-SCNC: 19 MMOL/L
APPEARANCE: ABNORMAL
AST SERPL-CCNC: 15 U/L
AST SERPL-CCNC: 27 U/L
BACTERIA: ABNORMAL
BASOPHILS # BLD AUTO: 0.01 K/UL
BASOPHILS # BLD AUTO: 0.02 K/UL
BASOPHILS NFR BLD AUTO: 0.2 %
BASOPHILS NFR BLD AUTO: 0.5 %
BILIRUB SERPL-MCNC: 0.3 MG/DL
BILIRUB SERPL-MCNC: 0.4 MG/DL
BILIRUBIN URINE: NEGATIVE
BLOOD URINE: ABNORMAL
BUN SERPL-MCNC: 37 MG/DL
BUN SERPL-MCNC: 60 MG/DL
CALCIUM SERPL-MCNC: 8.3 MG/DL
CALCIUM SERPL-MCNC: 8.5 MG/DL
CHLORIDE SERPL-SCNC: 98 MMOL/L
CHLORIDE SERPL-SCNC: 98 MMOL/L
CMV DNA SPEC QL NAA+PROBE: NOT DETECTED IU/ML
CMVPCR LOG: NOT DETECTED LOGIU/ML
CO2 SERPL-SCNC: 23 MMOL/L
CO2 SERPL-SCNC: 24 MMOL/L
COLOR: ABNORMAL
CREAT SERPL-MCNC: 11.45 MG/DL
CREAT SERPL-MCNC: 9.88 MG/DL
CREAT SPEC-SCNC: 72 MG/DL
CREAT/PROT UR: 13.7 RATIO
EOSINOPHIL # BLD AUTO: 0.12 K/UL
EOSINOPHIL # BLD AUTO: 0.21 K/UL
EOSINOPHIL NFR BLD AUTO: 2.6 %
EOSINOPHIL NFR BLD AUTO: 5.6 %
GLUCOSE QUALITATIVE U: 250 MG/DL
GLUCOSE SERPL-MCNC: 191 MG/DL
GLUCOSE SERPL-MCNC: 218 MG/DL
HCT VFR BLD CALC: 19.9 %
HCT VFR BLD CALC: 22.6 %
HGB BLD-MCNC: 6.5 G/DL
HGB BLD-MCNC: 7.3 G/DL
HYALINE CASTS: 0 /LPF
IMM GRANULOCYTES NFR BLD AUTO: 0.4 %
IMM GRANULOCYTES NFR BLD AUTO: 0.5 %
KETONES URINE: NEGATIVE
LDH SERPL-CCNC: 185 U/L
LDH SERPL-CCNC: 192 U/L
LEUKOCYTE ESTERASE URINE: ABNORMAL
LYMPHOCYTES # BLD AUTO: 0.53 K/UL
LYMPHOCYTES # BLD AUTO: 0.54 K/UL
LYMPHOCYTES NFR BLD AUTO: 11.3 %
LYMPHOCYTES NFR BLD AUTO: 14.5 %
MAGNESIUM SERPL-MCNC: 1.6 MG/DL
MAGNESIUM SERPL-MCNC: 1.8 MG/DL
MAN DIFF?: NORMAL
MAN DIFF?: NORMAL
MCHC RBC-ENTMCNC: 32.3 GM/DL
MCHC RBC-ENTMCNC: 32.3 PG
MCHC RBC-ENTMCNC: 32.7 GM/DL
MCHC RBC-ENTMCNC: 33.3 PG
MCV RBC AUTO: 100 FL
MCV RBC AUTO: 102.1 FL
MICROSCOPIC-UA: NORMAL
MONOCYTES # BLD AUTO: 0.02 K/UL
MONOCYTES # BLD AUTO: 0.1 K/UL
MONOCYTES NFR BLD AUTO: 0.4 %
MONOCYTES NFR BLD AUTO: 2.7 %
NEUTROPHILS # BLD AUTO: 2.83 K/UL
NEUTROPHILS # BLD AUTO: 3.98 K/UL
NEUTROPHILS NFR BLD AUTO: 76.2 %
NEUTROPHILS NFR BLD AUTO: 85.1 %
NITRITE URINE: NEGATIVE
PH URINE: 7
PHOSPHATE SERPL-MCNC: 4.9 MG/DL
PHOSPHATE SERPL-MCNC: 6.6 MG/DL
PLATELET # BLD AUTO: 166 K/UL
PLATELET # BLD AUTO: 168 K/UL
POTASSIUM SERPL-SCNC: 3.6 MMOL/L
POTASSIUM SERPL-SCNC: 4 MMOL/L
PROT SERPL-MCNC: 5.9 G/DL
PROT SERPL-MCNC: 6.1 G/DL
PROT UR-MCNC: 988 MG/DL
PROTEIN URINE: >300 MG/DL
RBC # BLD: 1.95 M/UL
RBC # BLD: 2.26 M/UL
RBC # FLD: 16.5 %
RBC # FLD: 16.9 %
RED BLOOD CELLS URINE: >720 /HPF
SODIUM SERPL-SCNC: 139 MMOL/L
SODIUM SERPL-SCNC: 141 MMOL/L
SPECIFIC GRAVITY URINE: 1.02
SQUAMOUS EPITHELIAL CELLS: 1 /HPF
TACROLIMUS SERPL-MCNC: 7.3 NG/ML
URATE SERPL-MCNC: 4.9 MG/DL
URATE SERPL-MCNC: 7.2 MG/DL
UROBILINOGEN URINE: NEGATIVE MG/DL
WBC # FLD AUTO: 3.72 K/UL
WBC # FLD AUTO: 4.68 K/UL
WHITE BLOOD CELLS URINE: 12 /HPF

## 2018-07-31 ENCOUNTER — APPOINTMENT (OUTPATIENT)
Dept: HEMATOLOGY ONCOLOGY | Facility: CLINIC | Age: 68
End: 2018-07-31

## 2018-07-31 LAB
APPEARANCE: ABNORMAL
BACTERIA: NEGATIVE
BILIRUBIN URINE: ABNORMAL
BKV DNA SPEC QL NAA+PROBE: NORMAL
BKV DNA SPEC QL NAA+PROBE: NORMAL
BLOOD URINE: ABNORMAL
CMV DNA SPEC QL NAA+PROBE: NOT DETECTED IU/ML
CMVPCR LOG: NOT DETECTED LOGIU/ML
COLOR: ABNORMAL
CREAT SPEC-SCNC: 126 MG/DL
CREAT/PROT UR: 3.4 RATIO
GLUCOSE QUALITATIVE U: 100
HYALINE CASTS: 0 /LPF
KETONES URINE: NEGATIVE
LEUKOCYTE ESTERASE URINE: ABNORMAL
MICROSCOPIC-UA: NORMAL
NITRITE URINE: NEGATIVE
PH URINE: 6.5
PROT UR-MCNC: 424 MG/DL
PROTEIN URINE: 300
RED BLOOD CELLS URINE: >720 /HPF
SPECIFIC GRAVITY URINE: 1.02
SQUAMOUS EPITHELIAL CELLS: 2 /HPF
TACROLIMUS SERPL-MCNC: 35 NG/ML
UROBILINOGEN URINE: NEGATIVE
WHITE BLOOD CELLS URINE: 163 /HPF

## 2018-08-01 ENCOUNTER — LABORATORY RESULT (OUTPATIENT)
Age: 68
End: 2018-08-01

## 2018-08-01 ENCOUNTER — INPATIENT (INPATIENT)
Facility: HOSPITAL | Age: 68
LOS: 13 days | Discharge: HOME | DRG: 811 | End: 2018-08-15
Attending: TRANSPLANT SURGERY | Admitting: TRANSPLANT SURGERY
Payer: MEDICARE

## 2018-08-01 ENCOUNTER — APPOINTMENT (OUTPATIENT)
Dept: NEPHROLOGY | Facility: CLINIC | Age: 68
End: 2018-08-01
Payer: MEDICARE

## 2018-08-01 VITALS
TEMPERATURE: 98.3 F | HEIGHT: 70 IN | RESPIRATION RATE: 14 BRPM | DIASTOLIC BLOOD PRESSURE: 59 MMHG | OXYGEN SATURATION: 97 % | WEIGHT: 235.89 LBS | SYSTOLIC BLOOD PRESSURE: 96 MMHG | HEART RATE: 97 BPM | BODY MASS INDEX: 33.77 KG/M2

## 2018-08-01 VITALS
DIASTOLIC BLOOD PRESSURE: 71 MMHG | HEIGHT: 70 IN | RESPIRATION RATE: 19 BRPM | SYSTOLIC BLOOD PRESSURE: 137 MMHG | OXYGEN SATURATION: 100 % | HEART RATE: 103 BPM | TEMPERATURE: 98 F | WEIGHT: 229.94 LBS

## 2018-08-01 VITALS — DIASTOLIC BLOOD PRESSURE: 62 MMHG | SYSTOLIC BLOOD PRESSURE: 108 MMHG

## 2018-08-01 DIAGNOSIS — Z94.0 KIDNEY TRANSPLANT STATUS: ICD-10-CM

## 2018-08-01 DIAGNOSIS — Z90.5 ACQUIRED ABSENCE OF KIDNEY: Chronic | ICD-10-CM

## 2018-08-01 DIAGNOSIS — R74.8 ABNORMAL LEVELS OF OTHER SERUM ENZYMES: ICD-10-CM

## 2018-08-01 DIAGNOSIS — I77.0 ARTERIOVENOUS FISTULA, ACQUIRED: Chronic | ICD-10-CM

## 2018-08-01 DIAGNOSIS — D64.9 ANEMIA, UNSPECIFIED: ICD-10-CM

## 2018-08-01 LAB
ALBUMIN SERPL ELPH-MCNC: 3.6 G/DL — SIGNIFICANT CHANGE UP (ref 3.3–5)
ALBUMIN SERPL ELPH-MCNC: 3.7 G/DL
ALP BLD-CCNC: 69 U/L
ALP SERPL-CCNC: 76 U/L — SIGNIFICANT CHANGE UP (ref 40–120)
ALT FLD-CCNC: 15 U/L — SIGNIFICANT CHANGE UP (ref 10–45)
ALT SERPL-CCNC: 17 U/L
ANION GAP SERPL CALC-SCNC: 13 MMOL/L — SIGNIFICANT CHANGE UP (ref 5–17)
ANION GAP SERPL CALC-SCNC: 16 MMOL/L
ANISOCYTOSIS BLD QL: SLIGHT — SIGNIFICANT CHANGE UP
APPEARANCE: ABNORMAL
APTT BLD: 29.7 SEC — SIGNIFICANT CHANGE UP (ref 27.5–37.4)
AST SERPL-CCNC: 16 U/L — SIGNIFICANT CHANGE UP (ref 10–40)
AST SERPL-CCNC: 21 U/L
BACTERIA: ABNORMAL
BASE EXCESS BLDV CALC-SCNC: 3 MMOL/L — HIGH (ref -2–2)
BASOPHILS # BLD AUTO: 0 K/UL — SIGNIFICANT CHANGE UP (ref 0–0.2)
BASOPHILS # BLD AUTO: 0.01 K/UL
BASOPHILS NFR BLD AUTO: 0 % — SIGNIFICANT CHANGE UP (ref 0–2)
BASOPHILS NFR BLD AUTO: 0.2 %
BILIRUB SERPL-MCNC: 0.2 MG/DL — SIGNIFICANT CHANGE UP (ref 0.2–1.2)
BILIRUB SERPL-MCNC: 0.3 MG/DL
BILIRUBIN URINE: NEGATIVE
BLD GP AB SCN SERPL QL: NEGATIVE — SIGNIFICANT CHANGE UP
BLOOD URINE: ABNORMAL
BUN SERPL-MCNC: 24 MG/DL
BUN SERPL-MCNC: 26 MG/DL — HIGH (ref 7–23)
CA-I SERPL-SCNC: 1.12 MMOL/L — SIGNIFICANT CHANGE UP (ref 1.12–1.3)
CALCIUM SERPL-MCNC: 8.3 MG/DL — LOW (ref 8.4–10.5)
CALCIUM SERPL-MCNC: 8.8 MG/DL
CHLORIDE BLDV-SCNC: 100 MMOL/L — SIGNIFICANT CHANGE UP (ref 96–108)
CHLORIDE SERPL-SCNC: 96 MMOL/L — SIGNIFICANT CHANGE UP (ref 96–108)
CHLORIDE SERPL-SCNC: 97 MMOL/L
CO2 BLDV-SCNC: 28 MMOL/L — SIGNIFICANT CHANGE UP (ref 22–30)
CO2 SERPL-SCNC: 24 MMOL/L
CO2 SERPL-SCNC: 24 MMOL/L — SIGNIFICANT CHANGE UP (ref 22–31)
COLOR: ABNORMAL
CREAT SERPL-MCNC: 6.57 MG/DL
CREAT SERPL-MCNC: 7.76 MG/DL — HIGH (ref 0.5–1.3)
CREAT SPEC-SCNC: 159 MG/DL
CREAT/PROT UR: 3.9 RATIO
DACRYOCYTES BLD QL SMEAR: SLIGHT — SIGNIFICANT CHANGE UP
ELLIPTOCYTES BLD QL SMEAR: SLIGHT — SIGNIFICANT CHANGE UP
EOSINOPHIL # BLD AUTO: 0 K/UL — SIGNIFICANT CHANGE UP (ref 0–0.5)
EOSINOPHIL # BLD AUTO: 0.05 K/UL
EOSINOPHIL NFR BLD AUTO: 0.7 % — SIGNIFICANT CHANGE UP (ref 0–6)
EOSINOPHIL NFR BLD AUTO: 0.9 %
GAS PNL BLDV: 134 MMOL/L — LOW (ref 136–145)
GAS PNL BLDV: SIGNIFICANT CHANGE UP
GIANT PLATELETS BLD QL SMEAR: PRESENT — SIGNIFICANT CHANGE UP
GLUCOSE BLDC GLUCOMTR-MCNC: 209 MG/DL — HIGH (ref 70–99)
GLUCOSE BLDV-MCNC: 255 MG/DL — HIGH (ref 70–99)
GLUCOSE QUALITATIVE U: 100 MG/DL
GLUCOSE SERPL-MCNC: 205 MG/DL
GLUCOSE SERPL-MCNC: 284 MG/DL — HIGH (ref 70–99)
HCO3 BLDV-SCNC: 27 MMOL/L — SIGNIFICANT CHANGE UP (ref 21–29)
HCT VFR BLD CALC: 21 %
HCT VFR BLD CALC: 21.1 % — LOW (ref 39–50)
HCT VFR BLDA CALC: 21 % — CRITICAL LOW (ref 39–50)
HGB BLD CALC-MCNC: 6.7 G/DL — CRITICAL LOW (ref 13–17)
HGB BLD-MCNC: 6.7 G/DL — CRITICAL LOW (ref 13–17)
HGB BLD-MCNC: 6.8 G/DL
HYALINE CASTS: 0 /LPF
HYPOCHROMIA BLD QL: SLIGHT — SIGNIFICANT CHANGE UP
IMM GRANULOCYTES NFR BLD AUTO: 0.2 %
INR BLD: 1 RATIO — SIGNIFICANT CHANGE UP (ref 0.88–1.16)
KETONES URINE: NEGATIVE
LACTATE BLDV-MCNC: 1.9 MMOL/L — SIGNIFICANT CHANGE UP (ref 0.7–2)
LDH SERPL-CCNC: 224 U/L
LEUKOCYTE ESTERASE URINE: ABNORMAL
LG PLATELETS BLD QL AUTO: SLIGHT — SIGNIFICANT CHANGE UP
LYMPHOCYTES # BLD AUTO: 0.3 K/UL — LOW (ref 1–3.3)
LYMPHOCYTES # BLD AUTO: 0.44 K/UL
LYMPHOCYTES # BLD AUTO: 5.8 % — LOW (ref 13–44)
LYMPHOCYTES NFR BLD AUTO: 8 %
MACROCYTES BLD QL: SLIGHT — SIGNIFICANT CHANGE UP
MAGNESIUM SERPL-MCNC: 1.6 MG/DL
MAGNESIUM SERPL-MCNC: 1.6 MG/DL — SIGNIFICANT CHANGE UP (ref 1.6–2.6)
MAN DIFF?: NORMAL
MCHC RBC-ENTMCNC: 31.7 GM/DL — LOW (ref 32–36)
MCHC RBC-ENTMCNC: 32.4 GM/DL
MCHC RBC-ENTMCNC: 33.2 PG — SIGNIFICANT CHANGE UP (ref 27–34)
MCHC RBC-ENTMCNC: 33.7 PG
MCV RBC AUTO: 104 FL
MCV RBC AUTO: 105 FL — HIGH (ref 80–100)
MICROCYTES BLD QL: SLIGHT — SIGNIFICANT CHANGE UP
MICROSCOPIC-UA: NORMAL
MONOCYTES # BLD AUTO: 0 K/UL — SIGNIFICANT CHANGE UP (ref 0–0.9)
MONOCYTES # BLD AUTO: 0.04 K/UL
MONOCYTES NFR BLD AUTO: 0.4 % — LOW (ref 2–14)
MONOCYTES NFR BLD AUTO: 0.7 %
NEUTROPHILS # BLD AUTO: 4.94 K/UL
NEUTROPHILS # BLD AUTO: 5 K/UL — SIGNIFICANT CHANGE UP (ref 1.8–7.4)
NEUTROPHILS NFR BLD AUTO: 90 %
NEUTROPHILS NFR BLD AUTO: 93 % — HIGH (ref 43–77)
NITRITE URINE: NEGATIVE
OVALOCYTES BLD QL SMEAR: SLIGHT — SIGNIFICANT CHANGE UP
PCO2 BLDV: 42 MMHG — SIGNIFICANT CHANGE UP (ref 35–50)
PH BLDV: 7.43 — SIGNIFICANT CHANGE UP (ref 7.35–7.45)
PH URINE: 6.5
PHOSPHATE SERPL-MCNC: 3.4 MG/DL
PHOSPHATE SERPL-MCNC: 3.4 MG/DL — SIGNIFICANT CHANGE UP (ref 2.5–4.5)
PLAT MORPH BLD: ABNORMAL
PLATELET # BLD AUTO: 171 K/UL — SIGNIFICANT CHANGE UP (ref 150–400)
PLATELET # BLD AUTO: 181 K/UL
PO2 BLDV: 48 MMHG — HIGH (ref 25–45)
POLYCHROMASIA BLD QL SMEAR: SLIGHT — SIGNIFICANT CHANGE UP
POTASSIUM BLDV-SCNC: 3.3 MMOL/L — LOW (ref 3.5–5.3)
POTASSIUM SERPL-MCNC: 3.5 MMOL/L — SIGNIFICANT CHANGE UP (ref 3.5–5.3)
POTASSIUM SERPL-SCNC: 3.5 MMOL/L — SIGNIFICANT CHANGE UP (ref 3.5–5.3)
POTASSIUM SERPL-SCNC: 3.6 MMOL/L
PROT SERPL-MCNC: 6 G/DL
PROT SERPL-MCNC: 6 G/DL — SIGNIFICANT CHANGE UP (ref 6–8.3)
PROT UR-MCNC: 619 MG/DL
PROTEIN URINE: >300 MG/DL
PROTHROM AB SERPL-ACNC: 10.8 SEC — SIGNIFICANT CHANGE UP (ref 9.8–12.7)
RBC # BLD: 2.01 M/UL — LOW (ref 4.2–5.8)
RBC # BLD: 2.02 M/UL
RBC # FLD: 16.6 % — HIGH (ref 10.3–14.5)
RBC # FLD: 16.7 %
RBC BLD AUTO: ABNORMAL
RED BLOOD CELLS URINE: >720 /HPF
RH IG SCN BLD-IMP: POSITIVE — SIGNIFICANT CHANGE UP
SAO2 % BLDV: 84 % — SIGNIFICANT CHANGE UP (ref 67–88)
SODIUM SERPL-SCNC: 133 MMOL/L — LOW (ref 135–145)
SODIUM SERPL-SCNC: 137 MMOL/L
SPECIFIC GRAVITY URINE: 1.02
SQUAMOUS EPITHELIAL CELLS: 1 /HPF
TACROLIMUS SERPL-MCNC: 24.1 NG/ML
TROPONIN T, HIGH SENSITIVITY RESULT: 52 NG/L — HIGH (ref 0–51)
URATE SERPL-MCNC: 3.3 MG/DL
UROBILINOGEN URINE: NEGATIVE MG/DL
WBC # BLD: 5.3 K/UL — SIGNIFICANT CHANGE UP (ref 3.8–10.5)
WBC # FLD AUTO: 5.3 K/UL — SIGNIFICANT CHANGE UP (ref 3.8–10.5)
WBC # FLD AUTO: 5.49 K/UL
WHITE BLOOD CELLS URINE: 502 /HPF

## 2018-08-01 PROCEDURE — 99215 OFFICE O/P EST HI 40 MIN: CPT | Mod: PD

## 2018-08-01 PROCEDURE — 99285 EMERGENCY DEPT VISIT HI MDM: CPT | Mod: 25

## 2018-08-01 PROCEDURE — 71045 X-RAY EXAM CHEST 1 VIEW: CPT | Mod: 26

## 2018-08-01 PROCEDURE — 93010 ELECTROCARDIOGRAM REPORT: CPT

## 2018-08-01 RX ORDER — DEXTROSE 50 % IN WATER 50 %
25 SYRINGE (ML) INTRAVENOUS ONCE
Qty: 0 | Refills: 0 | Status: DISCONTINUED | OUTPATIENT
Start: 2018-08-01 | End: 2018-08-15

## 2018-08-01 RX ORDER — VALGANCICLOVIR 450 MG/1
900 TABLET, FILM COATED ORAL DAILY
Qty: 0 | Refills: 0 | Status: DISCONTINUED | OUTPATIENT
Start: 2018-08-01 | End: 2018-08-06

## 2018-08-01 RX ORDER — TACROLIMUS 5 MG/1
6 CAPSULE ORAL
Qty: 0 | Refills: 0 | Status: DISCONTINUED | OUTPATIENT
Start: 2018-08-01 | End: 2018-08-02

## 2018-08-01 RX ORDER — DEXTROSE 50 % IN WATER 50 %
12.5 SYRINGE (ML) INTRAVENOUS ONCE
Qty: 0 | Refills: 0 | Status: DISCONTINUED | OUTPATIENT
Start: 2018-08-01 | End: 2018-08-15

## 2018-08-01 RX ORDER — INSULIN LISPRO 100/ML
VIAL (ML) SUBCUTANEOUS
Qty: 0 | Refills: 0 | Status: DISCONTINUED | OUTPATIENT
Start: 2018-08-01 | End: 2018-08-15

## 2018-08-01 RX ORDER — SODIUM CHLORIDE 9 MG/ML
1000 INJECTION, SOLUTION INTRAVENOUS
Qty: 0 | Refills: 0 | Status: DISCONTINUED | OUTPATIENT
Start: 2018-08-01 | End: 2018-08-15

## 2018-08-01 RX ORDER — NYSTATIN 500MM UNIT
500000 POWDER (EA) MISCELLANEOUS
Qty: 0 | Refills: 0 | Status: DISCONTINUED | OUTPATIENT
Start: 2018-08-01 | End: 2018-08-15

## 2018-08-01 RX ORDER — ASPIRIN/CALCIUM CARB/MAGNESIUM 324 MG
81 TABLET ORAL DAILY
Qty: 0 | Refills: 0 | Status: DISCONTINUED | OUTPATIENT
Start: 2018-08-01 | End: 2018-08-01

## 2018-08-01 RX ORDER — INSULIN LISPRO 100/ML
VIAL (ML) SUBCUTANEOUS AT BEDTIME
Qty: 0 | Refills: 0 | Status: DISCONTINUED | OUTPATIENT
Start: 2018-08-01 | End: 2018-08-15

## 2018-08-01 RX ORDER — DEXTROSE 50 % IN WATER 50 %
15 SYRINGE (ML) INTRAVENOUS ONCE
Qty: 0 | Refills: 0 | Status: DISCONTINUED | OUTPATIENT
Start: 2018-08-01 | End: 2018-08-15

## 2018-08-01 RX ORDER — MYCOPHENOLATE MOFETIL 250 MG/1
1000 CAPSULE ORAL
Qty: 0 | Refills: 0 | Status: DISCONTINUED | OUTPATIENT
Start: 2018-08-01 | End: 2018-08-01

## 2018-08-01 RX ORDER — TACROLIMUS 5 MG/1
6 CAPSULE ORAL EVERY 12 HOURS
Qty: 0 | Refills: 0 | Status: DISCONTINUED | OUTPATIENT
Start: 2018-08-01 | End: 2018-08-01

## 2018-08-01 RX ORDER — FUROSEMIDE 40 MG
80 TABLET ORAL EVERY 12 HOURS
Qty: 0 | Refills: 0 | Status: DISCONTINUED | OUTPATIENT
Start: 2018-08-01 | End: 2018-08-01

## 2018-08-01 RX ORDER — NYSTATIN 500MM UNIT
100000 POWDER (EA) MISCELLANEOUS
Qty: 0 | Refills: 0 | Status: DISCONTINUED | OUTPATIENT
Start: 2018-08-01 | End: 2018-08-01

## 2018-08-01 RX ORDER — GABAPENTIN 400 MG/1
300 CAPSULE ORAL DAILY
Qty: 0 | Refills: 0 | Status: DISCONTINUED | OUTPATIENT
Start: 2018-08-01 | End: 2018-08-15

## 2018-08-01 RX ORDER — SODIUM CHLORIDE 9 MG/ML
1000 INJECTION, SOLUTION INTRAVENOUS ONCE
Qty: 0 | Refills: 0 | Status: COMPLETED | OUTPATIENT
Start: 2018-08-01 | End: 2018-08-01

## 2018-08-01 RX ORDER — TAMSULOSIN HYDROCHLORIDE 0.4 MG/1
0.4 CAPSULE ORAL AT BEDTIME
Qty: 0 | Refills: 0 | Status: DISCONTINUED | OUTPATIENT
Start: 2018-08-01 | End: 2018-08-01

## 2018-08-01 RX ORDER — FUROSEMIDE 40 MG
80 TABLET ORAL
Qty: 0 | Refills: 0 | Status: DISCONTINUED | OUTPATIENT
Start: 2018-08-01 | End: 2018-08-01

## 2018-08-01 RX ORDER — GLUCAGON INJECTION, SOLUTION 0.5 MG/.1ML
1 INJECTION, SOLUTION SUBCUTANEOUS ONCE
Qty: 0 | Refills: 0 | Status: DISCONTINUED | OUTPATIENT
Start: 2018-08-01 | End: 2018-08-15

## 2018-08-01 RX ORDER — TAMSULOSIN HYDROCHLORIDE 0.4 MG/1
0.4 CAPSULE ORAL AT BEDTIME
Qty: 0 | Refills: 0 | Status: DISCONTINUED | OUTPATIENT
Start: 2018-08-01 | End: 2018-08-15

## 2018-08-01 RX ORDER — FAMOTIDINE 10 MG/ML
20 INJECTION INTRAVENOUS DAILY
Qty: 0 | Refills: 0 | Status: DISCONTINUED | OUTPATIENT
Start: 2018-08-01 | End: 2018-08-01

## 2018-08-01 RX ADMIN — SODIUM CHLORIDE 1000 MILLILITER(S): 9 INJECTION, SOLUTION INTRAVENOUS at 16:36

## 2018-08-01 RX ADMIN — Medication 500000 UNIT(S): at 23:51

## 2018-08-01 RX ADMIN — TAMSULOSIN HYDROCHLORIDE 0.4 MILLIGRAM(S): 0.4 CAPSULE ORAL at 23:48

## 2018-08-01 RX ADMIN — TACROLIMUS 6 MILLIGRAM(S): 5 CAPSULE ORAL at 23:51

## 2018-08-01 NOTE — ED ADULT NURSE REASSESSMENT NOTE - NS ED NURSE REASSESS COMMENT FT1
Report received from Martha SIDHU. Pt currently admitted will be ready to move after troponin level results. Tolerating blood transfusion well. A&Ox4 gross neuro intact, lungs cta bilaterally, no difficulty speaking in complete sentences, s1s2 heart sounds heard, pulses x 4, vasquez x4, abdomen soft nontender nondistended, skin intact. VSS NAD. Safety and comfort measures maintained.

## 2018-08-01 NOTE — H&P ADULT - NSHPPHYSICALEXAM_GEN_ALL_CORE
Gen: AAOx3, NAD, mentating normally  Neuro: Cranial nerves II-XII grossly intact  HEENT: Atraumatic, normocephalic  CV: RRR, normal S1/S2, no audible m/r/g  Pulm: Breathing comfortably on RA. Equal chest rise b/l. Lung fields CTAB  Abd: Soft, non-tender, non-distended. No rebound or guarding. RLQ surgical scar with staples intact. Scars from previous laparotomy & ostomy sites well-healed.  Back/flank: No CVA tenderness  Extremities: WWP. Moving all 4 extremities spontaneously. Strength 5/5. Sensation intact. No ANNMARIE.  Skin: No rashes or suspicious lesions

## 2018-08-01 NOTE — ED PROVIDER NOTE - OBJECTIVE STATEMENT
67 y /o male who presents to the ED for transfusion. patient had renal transplant from hep c positive patient 2 weeks ago , discharged 7 days ago on cellcept, prograf  and pred presents from outpatient office for low h/h. reports he had his routine follow up today and was told his hgb was 6.9. he does endorse that he has had some exertional SOB for a few days. no chest pain, no sob at rest. no black or bloody stools.

## 2018-08-01 NOTE — H&P ADULT - ASSESSMENT
Assessment:  67M s/p recent kidney transplant, sent in from office for drop in H&H.    Plan:  - Admit to [facility] surgery under Dr. Yao  - Patient transfused 1 unit pRBCs in ED  - Post-transfusion CBC ordered, will follow up  - Home meds ordered    Discussed with Dr. Najma Mckeon, PGY-2  [facility] [phone]

## 2018-08-01 NOTE — H&P ADULT - HISTORY OF PRESENT ILLNESS
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  The patient received a kidney from a 40M donor KDPI 30%. CIT 22 hours.  Simulect induction. He was transferred to SICU postop for hypotension and was briefly on Raymond-Synephrine and levophed. A renal duplex demonstrated good flow and no KEREN.  His post-operative course was complicated by delayed graft function requiring 2 sessions of HD and Lasix infusion.  His urine output improved, armenta was removed and he was transitioned to oral Lasix 80mg BID.  His immunosuppression was optimized. He was tolerating regular diet, ambulating and had bowel function.  He was dialyzed on the day of discharge with plan to have outpatient labs in 2 days to guide decision for future HD.  He was  evaluated by the disciplinary team including surgeon, nephrologist, NP, pharmacist, nutrition, social work, and nursing and deemed stable for discharge.

## 2018-08-01 NOTE — CONSULT NOTE ADULT - ASSESSMENT
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 (HEP C + DONOR) with CIT of 23 hours done on 7/17/18. Induction with Solumedrol and Simulect went  to f/u with Transplant Nephrologist and was found to have Anemia. He does home hemodialysis and last HD was yesterday.

## 2018-08-01 NOTE — ED ADULT NURSE NOTE - INTERVENTIONS DEFINITIONS
Physically safe environment: no spills, clutter or unnecessary equipment/Instruct patient to call for assistance/Tyrone to call system/Call bell, personal items and telephone within reach

## 2018-08-01 NOTE — H&P ADULT - NSHPLABSRESULTS_GEN_ALL_CORE
CBC (08-01 @ 16:11)                              6.7<LL>                         5.3     )----------------(  171        93.0<H>% Neutrophils, 5.8<L>% Lymphocytes, ANC: 5.0                                 21.1<L>    BMP (08-01 @ 16:11)             133<L>  |  96      |  26<H> 		Ca++ --      Ca 8.3<L>             ---------------------------------( 284<H>		Mg 1.6                3.5     |  24      |  7.76<H>			Ph 3.4       LFTs (08-01 @ 16:11)      TPro 6.0 / Alb 3.6 / TBili 0.2 / DBili -- / AST 16 / ALT 15 / AlkPhos 76    Coags (08-01 @ 16:11)  aPTT 29.7 / INR 1.00 / PT 10.8        VBG (08-01 @ 16:06)     7.43 / 42 / 48<H> / 27 / 3.0<H> / 84%     Lactate: 1.9

## 2018-08-01 NOTE — CONSULT NOTE ADULT - SUBJECTIVE AND OBJECTIVE BOX
[facility] DIVISION OF KIDNEY DISEASES AND HYPERTENSION -- INITIAL CONSULT NOTE  --------------------------------------------------------------------------------  Rocky Morocho  [id number]  --------------------------------------------------------------------------------  HPI:        PAST HISTORY  --------------------------------------------------------------------------------  PAST MEDICAL & SURGICAL HISTORY:  ESRD (end stage renal disease) on dialysis  Kidney cancer, primary, with metastasis from kidney to other site: with no mets  HTN (hypertension)  DM (diabetes mellitus)  S/p nephrectomy: right 12/10/2015  S/p nephrectomy: left 9/15/2015  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    FAMILY HISTORY:  No pertinent family history in first degree relatives    PAST SOCIAL HISTORY:    ALLERGIES & MEDICATIONS  --------------------------------------------------------------------------------  Allergies    No Known Allergies    Intolerances      Standing Inpatient Medications  dextrose 5%. 1000 milliLiter(s) IV Continuous <Continuous>  dextrose 50% Injectable 12.5 Gram(s) IV Push once  dextrose 50% Injectable 25 Gram(s) IV Push once  dextrose 50% Injectable 25 Gram(s) IV Push once  Epclusa 400-100mg 1 Tablet(s)   Oral at bedtime  gabapentin 300 milliGRAM(s) Oral daily  insulin lispro (HumaLOG) corrective regimen sliding scale   SubCutaneous three times a day before meals  insulin lispro (HumaLOG) corrective regimen sliding scale   SubCutaneous at bedtime  metolazone 5 milliGRAM(s) Oral daily  nystatin    Suspension 946222 Unit(s) Oral four times a day  tacrolimus 6 milliGRAM(s) Oral two times a day  tamsulosin 0.4 milliGRAM(s) Oral at bedtime  trimethoprim   80 mG/sulfamethoxazole 400 mG 1 Tablet(s) Oral daily  valGANciclovir 900 milliGRAM(s) Oral daily    PRN Inpatient Medications  dextrose 40% Gel 15 Gram(s) Oral once PRN  glucagon  Injectable 1 milliGRAM(s) IntraMuscular once PRN      REVIEW OF SYSTEMS  --------------------------------------------------------------------------------  Constitutional:No Fever,Chills , Fatigue , Weight change   HEENT:No Blurred vision,Eye Pain ,Headache, Runny nose,Sore Throat   Respiratory: No Cough, Wheezing ,Shortness of breath  Cardiovascular: No Chest Pain, Palpitations, ALVES, PND, Orthopnea  Gastrointestinal:No Abdominal Pain, Diarrhea, Constipation, Hemorrhoids, Nausea,  Vomiting  Genitourinary: No Nocturia, Dysuria, Incontinence  Extremities: No Swelling, Joint Pain  Neurologic:No Focal deficit, Paresthesia,  Syncope  Lymphatic: No Swelling, Lymphadenopathy   Skin: No Rash, Ecchymoses, Wounds, Lesions  Psychiatry: No Depression ,  Suicidal/Homicidal Ideation, Anxiety, Sleep Disturbances        All other systems were reviewed and are negative, except as noted.    VITALS/PHYSICAL EXAM  --------------------------------------------------------------------------------  T(C): 36.9 (08-01-18 @ 15:21), Max: 36.9 (08-01-18 @ 15:21)  HR: 87 (08-01-18 @ 19:43) (87 - 103)  BP: 121/65 (08-01-18 @ 19:43) (121/65 - 137/71)  RR: 17 (08-01-18 @ 19:43) (17 - 19)  SpO2: 100% (08-01-18 @ 19:43) (96% - 100%)  Wt(kg): --    Height (cm): 177.8 (08-01-18 @ 15:21)  Weight (kg): 104.3 (08-01-18 @ 15:21)  BMI (kg/m2): 33 (08-01-18 @ 15:21)  BSA (m2): 2.21 (08-01-18 @ 15:21)  Daily Height in cm: 177.8 (01 Aug 2018 15:21)    Daily   I&O's Summary          Physical Exam:  Gen: NAD   HEENT: anicteric  Pulm: CTA B/L  CV: RRR, Normal S1 S2  Abd: soft, nontender, nondistended  CNS: AAO x 3, No asterixis  : No Agustin  LE: Warm, no deny  Skin: Warm, without rashes  Vascular access: none        LABS/STUDIES  --------------------------------------------------------------------------------              6.7    5.3   >-----------<  171      [08-01-18 @ 16:11]              21.1     133  |  96  |  26  ----------------------------<  284      [08-01-18 @ 16:11]  3.5   |  24  |  7.76        Ca     8.3     [08-01-18 @ 16:11]      Mg     1.6     [08-01-18 @ 16:11]      Phos  3.4     [08-01-18 @ 16:11]    TPro  6.0  /  Alb  3.6  /  TBili  0.2  /  DBili  x   /  AST  16  /  ALT  15  /  AlkPhos  76  [08-01-18 @ 16:11]    PT/INR: PT 10.8 , INR 1.00       [08-01-18 @ 16:11]  PTT: 29.7       [08-01-18 @ 16:11]      Creatinine Trend:  SCr 7.76 [08-01 @ 16:11]  SCr 9.45 [07-25 @ 17:07]  SCr 13.71 [07-25 @ 06:46]  SCr 12.61 [07-24 @ 06:13]  SCr 11.34 [07-23 @ 05:53]        HbA1c 6.0      [07-17-18 @ 16:53]    HBsAb 5.6      [07-18-18 @ 19:20]  HBsAg Nonreact      [07-18-18 @ 19:20]  HBcAb Nonreact      [07-18-18 @ 19:20]  HCV 0.27, Nonreact      [07-18-18 @ 19:20]        Radiology  --------------------------------------------------------------------------------    --------------------------------------------------------------------------------  Rocky Morocho  [id number] [facility] DIVISION OF KIDNEY DISEASES AND HYPERTENSION -- INITIAL CONSULT NOTE  --------------------------------------------------------------------------------  Ceferinojoel Morocho  [id number]  --------------------------------------------------------------------------------  HPI:67 year old male with a PMH of ESRD on HD (2015), DM, HTN, bilateral nephrectomy (2015) s/p DDRT. (HEP C + DONOR) with CIT of 23 hours done on 7/17/18. Induction with Solumedrol and Simulect went  to f/u with Transplant Nephrologist, Dr Bhakta and was founf to have Anemia. He was told to come to ER for blood transfusion.  He c/o SOB on exertion but denies any nausea, vomiting, chest pain, cough, fever, skin rash, nocturia, dysuria, hematochezia, melena  He make around 1 L of urine and his urine color is clearing up since transplant.  He does Home Hemodialysis and last HD was yesterday.        PAST HISTORY  --------------------------------------------------------------------------------  PAST MEDICAL & SURGICAL HISTORY:  ESRD (end stage renal disease) on dialysis  Kidney cancer, primary, with metastasis from kidney to other site: with no mets  HTN (hypertension)  DM (diabetes mellitus)  S/p nephrectomy: right 12/10/2015  S/p nephrectomy: left 9/15/2015  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    FAMILY HISTORY:  No pertinent family history in first degree relatives    PAST SOCIAL HISTORY:    ALLERGIES & MEDICATIONS  --------------------------------------------------------------------------------  Allergies    No Known Allergies    Intolerances      Standing Inpatient Medications  dextrose 5%. 1000 milliLiter(s) IV Continuous <Continuous>  dextrose 50% Injectable 12.5 Gram(s) IV Push once  dextrose 50% Injectable 25 Gram(s) IV Push once  dextrose 50% Injectable 25 Gram(s) IV Push once  Epclusa 400-100mg 1 Tablet(s)   Oral at bedtime  gabapentin 300 milliGRAM(s) Oral daily  insulin lispro (HumaLOG) corrective regimen sliding scale   SubCutaneous three times a day before meals  insulin lispro (HumaLOG) corrective regimen sliding scale   SubCutaneous at bedtime  metolazone 5 milliGRAM(s) Oral daily  nystatin    Suspension 083079 Unit(s) Oral four times a day  tacrolimus 6 milliGRAM(s) Oral two times a day  tamsulosin 0.4 milliGRAM(s) Oral at bedtime  trimethoprim   80 mG/sulfamethoxazole 400 mG 1 Tablet(s) Oral daily  valGANciclovir 900 milliGRAM(s) Oral daily    PRN Inpatient Medications  dextrose 40% Gel 15 Gram(s) Oral once PRN  glucagon  Injectable 1 milliGRAM(s) IntraMuscular once PRN      REVIEW OF SYSTEMS  --------------------------------------------------------------------------------  Constitutional:No Fever,Chills , Fatigue , Weight change   HEENT:No Blurred vision,Eye Pain ,Headache, Runny nose,Sore Throat   Respiratory: Shortness of breath on exertion  Cardiovascular: No Chest Pain, Palpitations, ALVES, PND, Orthopnea  Gastrointestinal:No Abdominal Pain, Diarrhea, Constipation, Hemorrhoids, Nausea,  Vomiting  Genitourinary: No Nocturia, Dysuria, Incontinence  Extremities: No Swelling, Joint Pain  Neurologic:No Focal deficit, Paresthesia,  Syncope  Lymphatic: No Swelling, Lymphadenopathy   Skin: No Rash, Ecchymoses, Wounds, Lesions  Psychiatry: No Depression ,  Suicidal/Homicidal Ideation, Anxiety, Sleep Disturbances        All other systems were reviewed and are negative, except as noted.    VITALS/PHYSICAL EXAM  --------------------------------------------------------------------------------  T(C): 36.9 (08-01-18 @ 15:21), Max: 36.9 (08-01-18 @ 15:21)  HR: 87 (08-01-18 @ 19:43) (87 - 103)  BP: 121/65 (08-01-18 @ 19:43) (121/65 - 137/71)  RR: 17 (08-01-18 @ 19:43) (17 - 19)  SpO2: 100% (08-01-18 @ 19:43) (96% - 100%)  Wt(kg): --    Height (cm): 177.8 (08-01-18 @ 15:21)  Weight (kg): 104.3 (08-01-18 @ 15:21)  BMI (kg/m2): 33 (08-01-18 @ 15:21)  BSA (m2): 2.21 (08-01-18 @ 15:21)  Daily Height in cm: 177.8 (01 Aug 2018 15:21)    Daily   I&O's Summary          Physical Exam:  Gen: NAD   HEENT: anicteric  Pulm: CTA B/L  CV: RRR, Normal S1 S2  Abd: Well healing abdominal scar, soft, nontender, nondistended.  CNS: AAO x 3, No asterixis  : No Velez  LE: Warm, mild pedal edema  Skin: Warm, without rashes  Vascular access: Leftt forearm fistula with good bruit and thrill        LABS/STUDIES  --------------------------------------------------------------------------------              6.7    5.3   >-----------<  171      [08-01-18 @ 16:11]              21.1     133  |  96  |  26  ----------------------------<  284      [08-01-18 @ 16:11]  3.5   |  24  |  7.76        Ca     8.3     [08-01-18 @ 16:11]      Mg     1.6     [08-01-18 @ 16:11]      Phos  3.4     [08-01-18 @ 16:11]    TPro  6.0  /  Alb  3.6  /  TBili  0.2  /  DBili  x   /  AST  16  /  ALT  15  /  AlkPhos  76  [08-01-18 @ 16:11]    PT/INR: PT 10.8 , INR 1.00       [08-01-18 @ 16:11]  PTT: 29.7       [08-01-18 @ 16:11]      Creatinine Trend:  SCr 7.76 [08-01 @ 16:11]  SCr 9.45 [07-25 @ 17:07]  SCr 13.71 [07-25 @ 06:46]  SCr 12.61 [07-24 @ 06:13]  SCr 11.34 [07-23 @ 05:53]        HbA1c 6.0      [07-17-18 @ 16:53]    HBsAb 5.6      [07-18-18 @ 19:20]  HBsAg Nonreact      [07-18-18 @ 19:20]  HBcAb Nonreact      [07-18-18 @ 19:20]  HCV 0.27, Nonreact      [07-18-18 @ 19:20]        Radiology  --------------------------------------------------------------------------------    --------------------------------------------------------------------------------  Rocky Morocho  [id number]

## 2018-08-01 NOTE — ED PROVIDER NOTE - MEDICAL DECISION MAKING DETAILS
Attending MD Acuña: 67M with ho ESRD on HD, recent renal tx presenting with acute on chronic anemia and fatigue, outpatient labs notable for dropping h/h, will repeat cbc, rectal exam, transfuse as necessary. Transplant consultation

## 2018-08-01 NOTE — CONSULT NOTE ADULT - PROBLEM SELECTOR RECOMMENDATION 9
Continue his current meds:  Tacrolimus 6 mg PO BID  Cellcept 1000mg BID  Prednisone 5 mg PO daily  Bactrim 400/40 mg PO daily  Valcyte 900 mg Po daily  Lasix 80 mg PO BID  Metolazone 5 mg PO daily  Continue HD as per schedule

## 2018-08-01 NOTE — ED PROVIDER NOTE - ATTENDING CONTRIBUTION TO CARE
Attending MD Acuña:  I personally have seen and examined this patient.  Resident note reviewed and agree on plan of care and except where noted.  See HPI, PE, and MDM for details.       Attending MD Acuña: A & O x 3, NAD, EOMI b/l, PERRL b/l; lungs CTAB, heart with reg rhythm without murmur; abdomen soft NTND; extremities with no edema; affect appropriate. neuro exam non focal with no motor or sensory deficits. LUE AV fistula palpable thrill Attending MD Acuña:   I personally have seen and examined this patient.  Physician assistant note reviewed and agree on plan of care and except where noted.  See HPI, PE, and MDM for details.     Attending MD Acuña: A & O x 3, NAD, EOMI b/l, PERRL b/l; lungs CTAB, heart with reg rhythm without murmur; abdomen soft NTND; extremities with no edema; affect appropriate. neuro exam non focal with no motor or sensory deficits. LUE AV fistula palpable thrill

## 2018-08-01 NOTE — ED ADULT NURSE NOTE - OBJECTIVE STATEMENT
66 yo male comes to ED c/o abnormal labs and sob on exertion. Pt has h/o renal ca, both kidneys were removed and patient did own HD at home. Pt is no longer on HD and has a new kidney transplant 2 weeks ago. Pt stares at MD Chatterjee office today who is transplant MD that Hgb was 6.9 and he was advised to come to ED. Pt is taking tacrolimus and prograf. Pt states he is also starting treatment for Hep C because kidney donor was Hep C positive. At this time he is A&O x3 and well appearing. Skin is warm and dry. Abd soft non tender on distended. Color is appropriate for race. Lungs are clear. VSS/ NAD. He denies fevers, chills, chest pain, abd pain, black stools. He endorses some diarrhea yesterday with new meds. He is also endorsing blood in urine since surgery "that is clearing up". Safety and comfort maintained. Wife at bedside. Will continue to monitor.

## 2018-08-01 NOTE — CONSULT NOTE ADULT - ATTENDING COMMENTS
renal transplant recipient, admitted with severe anemia, has DGF, on hemodilaysis support  Plan: PRBC check K post PRBC  w/u for anemia  Hemodialysis as needed  Work up for DGF as planned  Will follow

## 2018-08-02 DIAGNOSIS — D89.9 DISORDER INVOLVING THE IMMUNE MECHANISM, UNSPECIFIED: ICD-10-CM

## 2018-08-02 DIAGNOSIS — E11.9 TYPE 2 DIABETES MELLITUS WITHOUT COMPLICATIONS: ICD-10-CM

## 2018-08-02 LAB
ANION GAP SERPL CALC-SCNC: 13 MMOL/L — SIGNIFICANT CHANGE UP (ref 5–17)
BKV DNA SPEC QL NAA+PROBE: NORMAL
BUN SERPL-MCNC: 28 MG/DL — HIGH (ref 7–23)
CALCIUM SERPL-MCNC: 8.4 MG/DL — SIGNIFICANT CHANGE UP (ref 8.4–10.5)
CHLORIDE SERPL-SCNC: 99 MMOL/L — SIGNIFICANT CHANGE UP (ref 96–108)
CO2 SERPL-SCNC: 24 MMOL/L — SIGNIFICANT CHANGE UP (ref 22–31)
CREAT SERPL-MCNC: 8.53 MG/DL — HIGH (ref 0.5–1.3)
GLUCOSE BLDC GLUCOMTR-MCNC: 116 MG/DL — HIGH (ref 70–99)
GLUCOSE BLDC GLUCOMTR-MCNC: 150 MG/DL — HIGH (ref 70–99)
GLUCOSE BLDC GLUCOMTR-MCNC: 176 MG/DL — HIGH (ref 70–99)
GLUCOSE BLDC GLUCOMTR-MCNC: 209 MG/DL — HIGH (ref 70–99)
GLUCOSE BLDC GLUCOMTR-MCNC: 212 MG/DL — HIGH (ref 70–99)
GLUCOSE SERPL-MCNC: 142 MG/DL — HIGH (ref 70–99)
HCT VFR BLD CALC: 21.9 % — LOW (ref 39–50)
HCT VFR BLD CALC: 22.2 % — LOW (ref 39–50)
HCT VFR BLD CALC: 25 % — LOW (ref 39–50)
HGB BLD-MCNC: 7.2 G/DL — LOW (ref 13–17)
HGB BLD-MCNC: 7.2 G/DL — LOW (ref 13–17)
HGB BLD-MCNC: 8.6 G/DL — LOW (ref 13–17)
MAGNESIUM SERPL-MCNC: 1.7 MG/DL — SIGNIFICANT CHANGE UP (ref 1.6–2.6)
MCHC RBC-ENTMCNC: 32.3 GM/DL — SIGNIFICANT CHANGE UP (ref 32–36)
MCHC RBC-ENTMCNC: 32.8 GM/DL — SIGNIFICANT CHANGE UP (ref 32–36)
MCHC RBC-ENTMCNC: 33.2 PG — SIGNIFICANT CHANGE UP (ref 27–34)
MCHC RBC-ENTMCNC: 33.5 PG — SIGNIFICANT CHANGE UP (ref 27–34)
MCHC RBC-ENTMCNC: 34 PG — SIGNIFICANT CHANGE UP (ref 27–34)
MCHC RBC-ENTMCNC: 34.2 GM/DL — SIGNIFICANT CHANGE UP (ref 32–36)
MCV RBC AUTO: 102 FL — HIGH (ref 80–100)
MCV RBC AUTO: 103 FL — HIGH (ref 80–100)
MCV RBC AUTO: 99.2 FL — SIGNIFICANT CHANGE UP (ref 80–100)
PHOSPHATE SERPL-MCNC: 4.3 MG/DL — SIGNIFICANT CHANGE UP (ref 2.5–4.5)
PLATELET # BLD AUTO: 150 K/UL — SIGNIFICANT CHANGE UP (ref 150–400)
PLATELET # BLD AUTO: 153 K/UL — SIGNIFICANT CHANGE UP (ref 150–400)
PLATELET # BLD AUTO: 174 K/UL — SIGNIFICANT CHANGE UP (ref 150–400)
POTASSIUM SERPL-MCNC: 3.4 MMOL/L — LOW (ref 3.5–5.3)
POTASSIUM SERPL-SCNC: 3.4 MMOL/L — LOW (ref 3.5–5.3)
RBC # BLD: 2.14 M/UL — LOW (ref 4.2–5.8)
RBC # BLD: 2.16 M/UL — LOW (ref 4.2–5.8)
RBC # BLD: 2.52 M/UL — LOW (ref 4.2–5.8)
RBC # FLD: 16.7 % — HIGH (ref 10.3–14.5)
RBC # FLD: 16.8 % — HIGH (ref 10.3–14.5)
RBC # FLD: 17.5 % — HIGH (ref 10.3–14.5)
SODIUM SERPL-SCNC: 136 MMOL/L — SIGNIFICANT CHANGE UP (ref 135–145)
TACROLIMUS SERPL-MCNC: 15.6 NG/ML — SIGNIFICANT CHANGE UP
WBC # BLD: 4.3 K/UL — SIGNIFICANT CHANGE UP (ref 3.8–10.5)
WBC # BLD: 4.8 K/UL — SIGNIFICANT CHANGE UP (ref 3.8–10.5)
WBC # BLD: 4.9 K/UL — SIGNIFICANT CHANGE UP (ref 3.8–10.5)
WBC # FLD AUTO: 4.3 K/UL — SIGNIFICANT CHANGE UP (ref 3.8–10.5)
WBC # FLD AUTO: 4.8 K/UL — SIGNIFICANT CHANGE UP (ref 3.8–10.5)
WBC # FLD AUTO: 4.9 K/UL — SIGNIFICANT CHANGE UP (ref 3.8–10.5)

## 2018-08-02 PROCEDURE — 76776 US EXAM K TRANSPL W/DOPPLER: CPT | Mod: 26,RT

## 2018-08-02 PROCEDURE — 93971 EXTREMITY STUDY: CPT | Mod: 26

## 2018-08-02 PROCEDURE — 99222 1ST HOSP IP/OBS MODERATE 55: CPT | Mod: GC

## 2018-08-02 PROCEDURE — 99232 SBSQ HOSP IP/OBS MODERATE 35: CPT | Mod: GC

## 2018-08-02 RX ORDER — DESMOPRESSIN ACETATE 0.1 MG/1
300 TABLET ORAL ONCE
Qty: 0 | Refills: 0 | Status: DISCONTINUED | OUTPATIENT
Start: 2018-08-02 | End: 2018-08-02

## 2018-08-02 RX ORDER — TACROLIMUS 5 MG/1
5 CAPSULE ORAL
Qty: 0 | Refills: 0 | Status: DISCONTINUED | OUTPATIENT
Start: 2018-08-02 | End: 2018-08-04

## 2018-08-02 RX ORDER — MAGNESIUM SULFATE 500 MG/ML
2 VIAL (ML) INJECTION ONCE
Qty: 0 | Refills: 0 | Status: COMPLETED | OUTPATIENT
Start: 2018-08-02 | End: 2018-08-02

## 2018-08-02 RX ORDER — DESMOPRESSIN ACETATE 0.1 MG/1
30 TABLET ORAL ONCE
Qty: 0 | Refills: 0 | Status: COMPLETED | OUTPATIENT
Start: 2018-08-02 | End: 2018-08-02

## 2018-08-02 RX ORDER — MYCOPHENOLATE MOFETIL 250 MG/1
1000 CAPSULE ORAL
Qty: 0 | Refills: 0 | Status: DISCONTINUED | OUTPATIENT
Start: 2018-08-02 | End: 2018-08-06

## 2018-08-02 RX ADMIN — Medication 500000 UNIT(S): at 11:22

## 2018-08-02 RX ADMIN — TACROLIMUS 5 MILLIGRAM(S): 5 CAPSULE ORAL at 17:45

## 2018-08-02 RX ADMIN — TAMSULOSIN HYDROCHLORIDE 0.4 MILLIGRAM(S): 0.4 CAPSULE ORAL at 21:48

## 2018-08-02 RX ADMIN — VALGANCICLOVIR 900 MILLIGRAM(S): 450 TABLET, FILM COATED ORAL at 11:22

## 2018-08-02 RX ADMIN — Medication 500000 UNIT(S): at 06:22

## 2018-08-02 RX ADMIN — Medication 1 TABLET(S): at 11:22

## 2018-08-02 RX ADMIN — TACROLIMUS 6 MILLIGRAM(S): 5 CAPSULE ORAL at 06:53

## 2018-08-02 RX ADMIN — Medication 500000 UNIT(S): at 21:49

## 2018-08-02 RX ADMIN — Medication 500000 UNIT(S): at 17:37

## 2018-08-02 RX ADMIN — GABAPENTIN 300 MILLIGRAM(S): 400 CAPSULE ORAL at 11:22

## 2018-08-02 RX ADMIN — DESMOPRESSIN ACETATE 115 MICROGRAM(S): 0.1 TABLET ORAL at 20:20

## 2018-08-02 RX ADMIN — MYCOPHENOLATE MOFETIL 1000 MILLIGRAM(S): 250 CAPSULE ORAL at 17:37

## 2018-08-02 RX ADMIN — Medication 50 GRAM(S): at 11:00

## 2018-08-02 NOTE — PROGRESS NOTE ADULT - SUBJECTIVE AND OBJECTIVE BOX
INTERVAL HPI/OVERNIGHT EVENTS:  Patient seen with multidisciplinary team (Nephrologist, pharmacist, nurse, nurse manager, NP, MD surgeons, transplant cordinator,  nutritionist, and  )  in am rounds and examined with Dr. Castaneda. 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  The patient received a kidney from a 40M donor KDPI 30%. CIT 22 hours.  Simulect induction. He was transferred to SICU postop for hypotension and was briefly on Raymond-Synephrine and levophed. A renal duplex demonstrated good flow and no KEREN.  His post-operative course was complicated by delayed graft function requiring resumption of TIW HD and PO Lasix 80mg BID.      No acute events overnight.  He was transfused 1 unit PRBC in ED. H/H 7.2/22.2 from 6.7/21.1.       MEDICATIONS  (STANDING):  dextrose 5%. 1000 milliLiter(s) (50 mL/Hr) IV Continuous <Continuous>  dextrose 50% Injectable 12.5 Gram(s) IV Push once  dextrose 50% Injectable 25 Gram(s) IV Push once  dextrose 50% Injectable 25 Gram(s) IV Push once  Epclusa 400-100mg 1 Tablet(s) 1 Tablet(s) Oral at bedtime  gabapentin 300 milliGRAM(s) Oral daily  insulin lispro (HumaLOG) corrective regimen sliding scale   SubCutaneous three times a day before meals  insulin lispro (HumaLOG) corrective regimen sliding scale   SubCutaneous at bedtime  magnesium sulfate  IVPB 2 Gram(s) IV Intermittent once  metolazone 5 milliGRAM(s) Oral daily  mycophenolate mofetil 1000 milliGRAM(s) Oral two times a day  nystatin    Suspension [id number] Unit(s) Oral four times a day  tacrolimus 6 milliGRAM(s) Oral two times a day  tamsulosin 0.4 milliGRAM(s) Oral at bedtime  trimethoprim   80 mG/sulfamethoxazole 400 mG 1 Tablet(s) Oral daily  valGANciclovir 900 milliGRAM(s) Oral daily    MEDICATIONS  (PRN):  dextrose 40% Gel 15 Gram(s) Oral once PRN Blood Glucose LESS THAN 70 milliGRAM(s)/deciliter  glucagon  Injectable 1 milliGRAM(s) IntraMuscular once PRN Glucose LESS THAN 70 milligrams/deciliter      Allergies    No Known Allergies    Intolerances        Vital Signs Last 24 Hrs  T(C): 36.9 (02 Aug 2018 09:31), Max: 36.9 (01 Aug 2018 15:21)  T(F): 98.4 (02 Aug 2018 09:31), Max: 98.5 (01 Aug 2018 15:21)  HR: 93 (02 Aug 2018 09:31) (73 - 103)  BP: 131/64 (02 Aug 2018 09:31) (117/65 - 160/81)  BP(mean): --  RR: 18 (02 Aug 2018 09:31) (17 - 19)  SpO2: 98% (02 Aug 2018 09:31) (96% - 100%)    LABS:                        7.2    4.3   )-----------( 174      ( 02 Aug 2018 07:02 )             21.9     08-02    136  |  99  |  28<H>  ----------------------------<  142<H>  3.4<L>   |  24  |  8.53<H>    Ca    8.4      02 Aug 2018 07:02  Phos  4.3     08-02  Mg     1.7     08-02    TPro  6.0  /  Alb  3.6  /  TBili  0.2  /  DBili  x   /  AST  16  /  ALT  15  /  AlkPhos  76  08-01    PT/INR - ( 01 Aug 2018 16:11 )   PT: 10.8 sec;   INR: 1.00 ratio         PTT - ( 01 Aug 2018 16:11 )  PTT:29.7 sec      RADIOLOGY & ADDITIONAL TESTS:    Review of systems  Gen: No weight changes, fatigue, fevers/chills, weakness  Skin: No rashes  Head/Eyes/Ears/Mouth: No headache; Normal hearing; Normal vision w/o blurriness; No sinus pain/discomfort, sore throat  Respiratory: No dyspnea, cough, wheezing, hemoptysis  CV: No chest pain, PND, orthopnea  GI: No abdominal pain, diarrhea, constipation, nausea, vomiting, melena, hematochezia  : No increased frequency, dysuria, hematuria, nocturia  MSK: No joint pain/swelling; no back pain;  Neuro: No dizziness/lightheadedness, weakness, seizures, numbness, tingling  Heme: No easy bruising or bleeding  Endo: No heat/cold intolerance  Psych: No significant nervousness, anxiety, stress, depression  All other systems were reviewed and are negative, except as noted.    PHYSICAL EXAM:  Constitutional: Well developed / well nourished  Eyes: Anicteric, PERRLA  ENMT: nc/at  Neck: supple  Respiratory: CTA B/L  Cardiovascular: RRR  Gastrointestinal: Soft abdomen, ND, NT.  Genitourinary: Voiding spontaneously  Extremities: SCD's in place and working bilaterally, + BLE edema  Vascular: Palpable dp pulses bilaterally  Neurological: A&O x3  Skin: Wound with staples, open to air no erythema and evidence of infection noted  Musculoskeletal: Moving all extremities  Psychiatric: Responsive

## 2018-08-02 NOTE — PROGRESS NOTE ADULT - ATTENDING COMMENTS
I personally saw and examined patient.  IMMUNOSUPPRESSION:  Prograf 5-5 starting tonight.  I personally reviewed Us of transplanted kidney.  Hematoma present.  Monitor hematocrit.  DDAVP.

## 2018-08-02 NOTE — PROGRESS NOTE ADULT - SUBJECTIVE AND OBJECTIVE BOX
[facility]    Hospital Day: 1    Postoperative Day: 15 (7/18/18) s/p DDRT (right) HCV+, CIT 23 hours    Subjective: Complains of 5 bouts of watery diarrhea this evening, unlike home stooling habits of soft bowel movements. 10 point ROS OTW negative.     Objective: Hemodynamically stable, has responded to blood transfusion adequately.     Vital Signs Last 24 Hrs  T(C): 37.3 (02 Aug 2018 20:18), Max: 37.3 (02 Aug 2018 20:18)  T(F): 99.2 (02 Aug 2018 20:18), Max: 99.2 (02 Aug 2018 20:18)  HR: 81 (02 Aug 2018 20:18) (73 - 93)  BP: 130/72 (02 Aug 2018 20:18) (117/65 - 160/81)  RR: 18 (02 Aug 2018 20:18) (18 - 18)  SpO2: 99% (02 Aug 2018 20:18) (97% - 100%)    I&O's Detail    01 Aug 2018 07:01  -  02 Aug 2018 07:00  --------------------------------------------------------  IN:    Oral Fluid: 240 mL  Total IN: 240 mL    OUT:  Total OUT: 0 mL    Total NET: 240 mL      02 Aug 2018 07:01  -  02 Aug 2018 21:58  --------------------------------------------------------  IN:    IV PiggyBack: 150 mL    Oral Fluid: 780 mL    Other: 1050 mL  Total IN: 1980 mL    OUT:    Indwelling Catheter - Urethral: 350 mL    Other: 2050 mL    Voided: 100 mL  Total OUT: 2500 mL    Total NET: -520 mL    physical examination  GEN: NAD, AAOx3  Resp: nonlabored  CV: normotensive, RRR  Abdomen: wound with staples, no erythema or drainage, abdomen is soft, NT, ND, fullness of RLQ adjacent incision       Daily Height in cm: 177.8 (01 Aug 2018 22:36)    Daily     LABS:                        8.6    4.9   )-----------( 150      ( 02 Aug 2018 18:52 )             25.0     08-02    136  |  99  |  28<H>  ----------------------------<  142<H>  3.4<L>   |  24  |  8.53<H>    Ca    8.4      02 Aug 2018 07:02  Phos  4.3     08-02  Mg     1.7     08-02    TPro  6.0  /  Alb  3.6  /  TBili  0.2  /  DBili  x   /  AST  16  /  ALT  15  /  AlkPhos  76  08-01    PT/INR - ( 01 Aug 2018 16:11 )   PT: 10.8 sec;   INR: 1.00 ratio         PTT - ( 01 Aug 2018 16:11 )  PTT:29.7 sec      RADIOLOGY & ADDITIONAL STUDIES:      < from: US Trans Kidney w/ Doppler, Right (08.02.18 @ 12:42) >    EXAM:  US KIDNEY TRANSPLANT W DOPP RT                            PROCEDURE DATE:  08/02/2018            INTERPRETATION:  CLINICAL INFORMATION: Right lower quadrant transplant   kidney. Delayed graft function. Creatinine is 8.53.    COMPARISON: 7/27/2018.    TECHNIQUE: Grayscale, Color and spectral Doppler evaluation of a RIGHT   renal transplant.     FINDINGS:    Renal Transplant: 12.4 cm. No renal mass or calculi. Mild hydronephrosis.   There is a complex fluid collection located inferior to the transplant   kidney measuring 10.5 cm x 2.9 cm x 8 cm most consistent with an   intrapelvic hematoma without compression on the transplant kidney.     Urinary bladder: Bladder decompressed by indwelling Velez catheter.    Color and spectral Dopplerreveals homogeneous flow throughout the   transplant.    Peak iliac artery velocity is 114 cm/sec pre-anastomosis, 144 cm/sec at   the anastomosis, and 110 cm/sec post anastomosis.    Transplant Renal Artery:   Peak systolic velocity is 93 cm/sec anastomosis, 117 cm/sec proximal, 151   cm/sec mid, 87 cm/sec distal and 80 cm/sec hilum.     Resistive Indices Range: 0.66-0.85    Transplant Renal Vein: Patent.    IMPRESSION:     No evidence of a significant transplant renal artery stenosis. Transplant   renal vein is patent.    Mild hydronephrosis similar to 7/27/2018.    10.5 cm x 2.9 cm x 8 cm complex fluid collection inferior to the   transplant kidney most consistent with an evolving hematoma. There is no   compression on the transplant kidney.                        VAUGHN COELLO M.D., ATTENDING RADIOLOGIST  This document has been electronically signed. Aug  2 2018 12:46PM                < end of copied text >

## 2018-08-02 NOTE — PROGRESS NOTE ADULT - PROBLEM SELECTOR PLAN 2
-Continue nystatin, Bactrim, Valcyte 900mg daily, Tacrolimus  -Tacrolimus troughs daily, goal 8-10  -Continue Epclusa 2/2 HCV + donor   - cellcept held

## 2018-08-02 NOTE — PROGRESS NOTE ADULT - PROBLEM SELECTOR PLAN 1
7/17/18 HCV + DDRT with DGF, Creatinine 7.76 on HD 3 times weekly at home  - Place armenta catheter today as patient does not remember to record output and need to monitor I/Os  -renal ultrasound today  -Anemia. Transfuse 1 unit PRBC today. Send Post-transfusion labs  -BMP daily  -Replete Lytes  -Diabetic, renal diet as tolerated  -SCDs, Ambulate as tolerated

## 2018-08-02 NOTE — PROVIDER CONTACT NOTE (OTHER) - ASSESSMENT
pt states he is having watery stools, with no abd pain, +flatus and burping, +bowel sounds in all 4 quad, abd obese but sound and nontender.

## 2018-08-03 LAB
ANION GAP SERPL CALC-SCNC: 14 MMOL/L — SIGNIFICANT CHANGE UP (ref 5–17)
BUN SERPL-MCNC: 22 MG/DL — SIGNIFICANT CHANGE UP (ref 7–23)
CALCIUM SERPL-MCNC: 8.4 MG/DL — SIGNIFICANT CHANGE UP (ref 8.4–10.5)
CHLORIDE SERPL-SCNC: 97 MMOL/L — SIGNIFICANT CHANGE UP (ref 96–108)
CO2 SERPL-SCNC: 26 MMOL/L — SIGNIFICANT CHANGE UP (ref 22–31)
CREAT SERPL-MCNC: 6.32 MG/DL — HIGH (ref 0.5–1.3)
GLUCOSE BLDC GLUCOMTR-MCNC: 154 MG/DL — HIGH (ref 70–99)
GLUCOSE BLDC GLUCOMTR-MCNC: 165 MG/DL — HIGH (ref 70–99)
GLUCOSE BLDC GLUCOMTR-MCNC: 210 MG/DL — HIGH (ref 70–99)
GLUCOSE BLDC GLUCOMTR-MCNC: 212 MG/DL — HIGH (ref 70–99)
GLUCOSE SERPL-MCNC: 170 MG/DL — HIGH (ref 70–99)
HCT VFR BLD CALC: 22 % — LOW (ref 39–50)
HCT VFR BLD CALC: 25.4 % — LOW (ref 39–50)
HGB BLD-MCNC: 7.5 G/DL — LOW (ref 13–17)
HGB BLD-MCNC: 8.8 G/DL — LOW (ref 13–17)
MAGNESIUM SERPL-MCNC: 1.8 MG/DL — SIGNIFICANT CHANGE UP (ref 1.6–2.6)
MCHC RBC-ENTMCNC: 32.8 PG — SIGNIFICANT CHANGE UP (ref 27–34)
MCHC RBC-ENTMCNC: 34.2 GM/DL — SIGNIFICANT CHANGE UP (ref 32–36)
MCHC RBC-ENTMCNC: 34.2 PG — HIGH (ref 27–34)
MCHC RBC-ENTMCNC: 34.5 GM/DL — SIGNIFICANT CHANGE UP (ref 32–36)
MCV RBC AUTO: 95.2 FL — SIGNIFICANT CHANGE UP (ref 80–100)
MCV RBC AUTO: 99.9 FL — SIGNIFICANT CHANGE UP (ref 80–100)
PHOSPHATE SERPL-MCNC: 3.3 MG/DL — SIGNIFICANT CHANGE UP (ref 2.5–4.5)
PLATELET # BLD AUTO: 139 K/UL — LOW (ref 150–400)
PLATELET # BLD AUTO: 145 K/UL — LOW (ref 150–400)
POTASSIUM SERPL-MCNC: 3.5 MMOL/L — SIGNIFICANT CHANGE UP (ref 3.5–5.3)
POTASSIUM SERPL-SCNC: 3.5 MMOL/L — SIGNIFICANT CHANGE UP (ref 3.5–5.3)
RBC # BLD: 2.2 M/UL — LOW (ref 4.2–5.8)
RBC # BLD: 2.67 M/UL — LOW (ref 4.2–5.8)
RBC # FLD: 17.5 % — HIGH (ref 10.3–14.5)
RBC # FLD: 19 % — HIGH (ref 10.3–14.5)
SODIUM SERPL-SCNC: 137 MMOL/L — SIGNIFICANT CHANGE UP (ref 135–145)
TACROLIMUS SERPL-MCNC: 13.7 NG/ML — SIGNIFICANT CHANGE UP
WBC # BLD: 4.1 K/UL — SIGNIFICANT CHANGE UP (ref 3.8–10.5)
WBC # BLD: 4.3 K/UL — SIGNIFICANT CHANGE UP (ref 3.8–10.5)
WBC # FLD AUTO: 4.1 K/UL — SIGNIFICANT CHANGE UP (ref 3.8–10.5)
WBC # FLD AUTO: 4.3 K/UL — SIGNIFICANT CHANGE UP (ref 3.8–10.5)

## 2018-08-03 PROCEDURE — 99232 SBSQ HOSP IP/OBS MODERATE 35: CPT | Mod: GC

## 2018-08-03 PROCEDURE — 76775 US EXAM ABDO BACK WALL LIM: CPT | Mod: 26

## 2018-08-03 RX ORDER — ACETAMINOPHEN 500 MG
650 TABLET ORAL EVERY 6 HOURS
Qty: 0 | Refills: 0 | Status: DISCONTINUED | OUTPATIENT
Start: 2018-08-03 | End: 2018-08-15

## 2018-08-03 RX ORDER — MAGNESIUM SULFATE 500 MG/ML
2 VIAL (ML) INJECTION ONCE
Qty: 0 | Refills: 0 | Status: COMPLETED | OUTPATIENT
Start: 2018-08-03 | End: 2018-08-03

## 2018-08-03 RX ADMIN — Medication 1: at 08:04

## 2018-08-03 RX ADMIN — GABAPENTIN 300 MILLIGRAM(S): 400 CAPSULE ORAL at 11:54

## 2018-08-03 RX ADMIN — Medication 500000 UNIT(S): at 18:04

## 2018-08-03 RX ADMIN — Medication 1: at 16:47

## 2018-08-03 RX ADMIN — MYCOPHENOLATE MOFETIL 1000 MILLIGRAM(S): 250 CAPSULE ORAL at 18:04

## 2018-08-03 RX ADMIN — VALGANCICLOVIR 900 MILLIGRAM(S): 450 TABLET, FILM COATED ORAL at 11:54

## 2018-08-03 RX ADMIN — Medication 50 GRAM(S): at 08:15

## 2018-08-03 RX ADMIN — Medication 1 TABLET(S): at 11:53

## 2018-08-03 RX ADMIN — Medication 500000 UNIT(S): at 06:01

## 2018-08-03 RX ADMIN — Medication 500000 UNIT(S): at 11:54

## 2018-08-03 RX ADMIN — TACROLIMUS 5 MILLIGRAM(S): 5 CAPSULE ORAL at 06:01

## 2018-08-03 RX ADMIN — Medication 2: at 12:26

## 2018-08-03 RX ADMIN — TACROLIMUS 5 MILLIGRAM(S): 5 CAPSULE ORAL at 18:04

## 2018-08-03 RX ADMIN — TAMSULOSIN HYDROCHLORIDE 0.4 MILLIGRAM(S): 0.4 CAPSULE ORAL at 21:33

## 2018-08-03 RX ADMIN — MYCOPHENOLATE MOFETIL 1000 MILLIGRAM(S): 250 CAPSULE ORAL at 06:03

## 2018-08-03 NOTE — PROGRESS NOTE ADULT - SUBJECTIVE AND OBJECTIVE BOX
[facility] DIVISION OF KIDNEY DISEASES AND HYPERTENSION -- FOLLOW UP NOTE  --------------------------------------------------------------------------------  Chief Complaint: DDRT  24 hour events/subjective:  s/p 1 U PRBC during HD yesterday  HD with ~2.5 L UF.  s/p armenta with 650 cc UOP.        PAST HISTORY  --------------------------------------------------------------------------------  No significant changes to PMH, PSH, FHx, SHx, unless otherwise noted    ALLERGIES & MEDICATIONS  --------------------------------------------------------------------------------  Allergies    No Known Allergies    Intolerances      Standing Inpatient Medications  dextrose 5%. 1000 milliLiter(s) IV Continuous <Continuous>  dextrose 50% Injectable 12.5 Gram(s) IV Push once  dextrose 50% Injectable 25 Gram(s) IV Push once  dextrose 50% Injectable 25 Gram(s) IV Push once  Epclusa 400-100mg 1 Tablet(s) 1 Tablet(s) Oral at bedtime  gabapentin 300 milliGRAM(s) Oral daily  insulin lispro (HumaLOG) corrective regimen sliding scale   SubCutaneous three times a day before meals  insulin lispro (HumaLOG) corrective regimen sliding scale   SubCutaneous at bedtime  metolazone 5 milliGRAM(s) Oral daily  mycophenolate mofetil 1000 milliGRAM(s) Oral two times a day  nystatin    Suspension 700251 Unit(s) Oral four times a day  tacrolimus 5 milliGRAM(s) Oral two times a day  tamsulosin 0.4 milliGRAM(s) Oral at bedtime  trimethoprim   80 mG/sulfamethoxazole 400 mG 1 Tablet(s) Oral daily  valGANciclovir 900 milliGRAM(s) Oral daily    PRN Inpatient Medications  dextrose 40% Gel 15 Gram(s) Oral once PRN  glucagon  Injectable 1 milliGRAM(s) IntraMuscular once PRN      REVIEW OF SYSTEMS  --------------------------------------------------------------------------------  Gen: No weight changes, fatigue, fevers/chills, weakness  Skin: No rashes  Head/Eyes/Ears/Mouth: No headache; Normal hearing; Normal vision w/o blurriness;   Respiratory: No dyspnea, cough, wheezing, hemoptysis  CV: No chest pain, PND, orthopnea  GI: No abdominal pain, diarrhea, constipation, nausea, vomiting, melena, hematochezia  : No increased frequency, dysuria, hematuria, nocturia  MSK: No joint pain/swelling; no back pain; no edema      VITALS/PHYSICAL EXAM  --------------------------------------------------------------------------------  T(C): 36.9 (08-03-18 @ 13:37), Max: 37.3 (08-02-18 @ 20:18)  HR: 81 (08-03-18 @ 13:37) (78 - 88)  BP: 130/73 (08-03-18 @ 13:37) (107/64 - 132/73)  RR: 18 (08-03-18 @ 13:37) (18 - 18)  SpO2: 99% (08-03-18 @ 13:37) (96% - 100%)  Wt(kg): --  Height (cm): 177.8 (08-01-18 @ 22:36)  Weight (kg): 102.7 (08-03-18 @ 06:08)  BMI (kg/m2): 32.5 (08-03-18 @ 06:08)  BSA (m2): 2.2 (08-03-18 @ 06:08)      08-02-18 @ 07:01  -  08-03-18 @ 07:00  --------------------------------------------------------  IN: 1980 mL / OUT: 2800 mL / NET: -820 mL    08-03-18 @ 07:01  -  08-03-18 @ 15:02  --------------------------------------------------------  IN: 240 mL / OUT: 200 mL / NET: 40 mL      Physical Exam:  Gen: NAD   Pulm: CTA B/L  CV: RRR, Normal S1 S2  Abd: Well healing abdominal scar, soft, nontender, nondistended. Graft site non tender  CNS: No asterixis  : geovany+  LE: Warm, no edema  Skin: Warm, without rashes  Vascular access: Left forearm AV fistula with bruit and thrill+    LABS/STUDIES  --------------------------------------------------------------------------------              7.5    4.1   >-----------<  145      [08-03-18 @ 06:03]              22.0     137  |  97  |  22  ----------------------------<  170      [08-03-18 @ 06:03]  3.5   |  26  |  6.32        Ca     8.4     [08-03-18 @ 06:03]      Mg     1.8     [08-03-18 @ 06:03]      Phos  3.3     [08-03-18 @ 06:03]    TPro  6.0  /  Alb  3.6  /  TBili  0.2  /  DBili  x   /  AST  16  /  ALT  15  /  AlkPhos  76  [08-01-18 @ 16:11]    PT/INR: PT 10.8 , INR 1.00       [08-01-18 @ 16:11]  PTT: 29.7       [08-01-18 @ 16:11]      Creatinine Trend:  SCr 6.32 [08-03 @ 06:03]  SCr 8.53 [08-02 @ 07:02]  SCr 7.76 [08-01 @ 16:11]  SCr 9.45 [07-25 @ 17:07]  SCr 13.71 [07-25 @ 06:46]        HbA1c 6.0      [07-17-18 @ 16:53]    HBsAb 5.6      [07-18-18 @ 19:20]  HBsAg Nonreact      [07-18-18 @ 19:20]  HBcAb Nonreact      [07-18-18 @ 19:20]  HCV 0.27, Nonreact      [07-18-18 @ 19:20]

## 2018-08-03 NOTE — PROGRESS NOTE ADULT - SUBJECTIVE AND OBJECTIVE BOX
Transplant Surgery - Multidisciplinary Rounds  --------------------------------------------------------------  DDRT  7/17/18    Present:  Patient seen with multidisciplinary team (surgeon, Nephrologist, NP, Resident MD and med student)  in am rounds and examined with Dr. Castaneda. 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  The patient received a kidney from a 40M donor KDPI 30%. CIT 22 hours.  Simulect induction. He was transferred to SICU postop for hypotension and was briefly on Raymond-Synephrine and levophed. A renal duplex demonstrated good flow and no KEREN.  His post-operative course was complicated by delayed graft function requiring TIW HD and PO Lasix 80mg BID.      No acute events overnight. Renal ultrasound demonstrated mild hydronephrosis, and 10.5x2.9x8cm complex fluid collection inferior to kidney comparable with evolving hematoma. He received 30mcg DDAVP x1 and was transfused 1 unit PRBC. He was dialyzed.       MEDICATIONS  (STANDING):  dextrose 5%. 1000 milliLiter(s) (50 mL/Hr) IV Continuous <Continuous>  dextrose 50% Injectable 12.5 Gram(s) IV Push once  dextrose 50% Injectable 25 Gram(s) IV Push once  dextrose 50% Injectable 25 Gram(s) IV Push once  Epclusa 400-100mg 1 Tablet(s) 1 Tablet(s) Oral at bedtime  gabapentin 300 milliGRAM(s) Oral daily  insulin lispro (HumaLOG) corrective regimen sliding scale   SubCutaneous three times a day before meals  insulin lispro (HumaLOG) corrective regimen sliding scale   SubCutaneous at bedtime  metolazone 5 milliGRAM(s) Oral daily  mycophenolate mofetil 1000 milliGRAM(s) Oral two times a day  nystatin    Suspension 019001 Unit(s) Oral four times a day  tacrolimus 5 milliGRAM(s) Oral two times a day  tamsulosin 0.4 milliGRAM(s) Oral at bedtime  trimethoprim   80 mG/sulfamethoxazole 400 mG 1 Tablet(s) Oral daily  valGANciclovir 900 milliGRAM(s) Oral daily    MEDICATIONS  (PRN):  dextrose 40% Gel 15 Gram(s) Oral once PRN Blood Glucose LESS THAN 70 milliGRAM(s)/deciliter  glucagon  Injectable 1 milliGRAM(s) IntraMuscular once PRN Glucose LESS THAN 70 milligrams/deciliter      PAST MEDICAL & SURGICAL HISTORY:  ESRD (end stage renal disease) on dialysis  Kidney cancer, primary, with metastasis from kidney to other site: with no mets  HTN (hypertension)  DM (diabetes mellitus)  S/p nephrectomy: right 12/10/2015  S/p nephrectomy: left 9/15/2015  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      Vital Signs Last 24 Hrs  T(C): 36.3 (03 Aug 2018 11:15), Max: 37.3 (02 Aug 2018 20:18)  T(F): 97.3 (03 Aug 2018 11:15), Max: 99.2 (02 Aug 2018 20:18)  HR: 78 (03 Aug 2018 11:15) (78 - 88)  BP: 113/69 (03 Aug 2018 11:15) (107/64 - 132/73)  BP(mean): --  RR: 18 (03 Aug 2018 11:15) (18 - 18)  SpO2: 96% (03 Aug 2018 11:15) (96% - 100%)    I&O's Summary    02 Aug 2018 07:01  -  03 Aug 2018 07:00  --------------------------------------------------------  IN: 1980 mL / OUT: 2800 mL / NET: -820 mL    03 Aug 2018 07:01  -  03 Aug 2018 13:37  --------------------------------------------------------  IN: 0 mL / OUT: 100 mL / NET: -100 mL                              7.5    4.1   )-----------( 145      ( 03 Aug 2018 06:03 )             22.0     08-03    137  |  97  |  22  ----------------------------<  170<H>  3.5   |  26  |  6.32<H>    Ca    8.4      03 Aug 2018 06:03  Phos  3.3     08-03  Mg     1.8     08-03    TPro  6.0  /  Alb  3.6  /  TBili  0.2  /  DBili  x   /  AST  16  /  ALT  15  /  AlkPhos  76  08-01    Tacrolimus (), Serum: 13.7 ng/mL (08-03 @ 08:08)      Review of systems  Gen: No weight changes, fatigue, fevers/chills, weakness  Skin: No rashes  Head/Eyes/Ears/Mouth: No headache; Normal hearing; Normal vision w/o blurriness; No sinus pain/discomfort, sore throat  Respiratory: No dyspnea, cough, wheezing, hemoptysis  CV: No chest pain, PND, orthopnea  GI: No abdominal pain, diarrhea, constipation, nausea, vomiting, melena, hematochezia  : No increased frequency, dysuria, hematuria, nocturia  MSK: No joint pain/swelling; no back pain;  Neuro: No dizziness/lightheadedness, weakness, seizures, numbness, tingling  Heme: No easy bruising or bleeding  Endo: No heat/cold intolerance  Psych: No significant nervousness, anxiety, stress, depression  All other systems were reviewed and are negative, except as noted.    PHYSICAL EXAM:  Constitutional: Well developed / well nourished  Eyes: Anicteric, PERRLA  ENMT: nc/at  Neck: supple  Respiratory: CTA B/L  Cardiovascular: RRR  Gastrointestinal: Soft abdomen, ND, NT.  Genitourinary: Voiding spontaneously  Extremities: SCD's in place and working bilaterally. no edema  Vascular: Palpable dp pulses bilaterally  Neurological: A&O x3  Skin: Wound with staples, open to air no erythema and evidence of infection noted  Musculoskeletal: Moving all extremities  Psychiatric: Responsive

## 2018-08-03 NOTE — PROGRESS NOTE ADULT - ATTENDING COMMENTS
No change in Tacrolimus dose I personally saw and examined patient with transplant team in the morning and later with Ms. Jorgensen.  IMMUNOSUPPRESSION:  No change in Tacrolimus dose I personally saw and examined patient with transplant team in the morning and later with Ms. Jorgensen.  IMMUNOSUPPRESSION:  No change in Tacrolimus dose  Unchanged donal-transplant hematoma according to US examination.

## 2018-08-03 NOTE — PROGRESS NOTE ADULT - PROBLEM SELECTOR PLAN 2
-Continue nystatin, Bactrim, Valcyte 900mg daily, Tacrolimus  -Tacrolimus troughs daily, goal 8-10  -Continue Epclusa 2/2 HCV + donor   - cellcept held.

## 2018-08-03 NOTE — PROGRESS NOTE ADULT - PROBLEM SELECTOR PLAN 1
7/17/18 HCV + DDRT with DGF on HD 3 times weekly at home  - Anemia. Transfuse 2 unit PRBC today. Send Post-transfusion labs. Patient was using heparin during each home dialysis session prior to this admission.   - repeat renal ultrasound today to evaluate for hematoma progression  - Continue armenta catheter to monitor output.   -BMP daily  -Replete Lytes  -Diabetic, renal diet as tolerated  -SCDs, Ambulate as tolerated.

## 2018-08-03 NOTE — PROGRESS NOTE ADULT - PROBLEM SELECTOR PLAN 1
7/17/18 HCV + DDRT with DGF on HD 3 times weekly at home  - Anemia. Transfuse 2 unit PRBC today. Patient was using heparin during each home dialysis session prior to this admission.   - repeat renal ultrasound today to evaluate for hematoma progression  - Continue armenta catheter to monitor output.   -BMP daily  -HD tomorrow  -continue immmunosuppression.

## 2018-08-03 NOTE — PROGRESS NOTE ADULT - ATTENDING COMMENTS
renal transplant recipient, anemia, hematoma, DGF, admitted for low Hb, s/p PRBC, hemodialysis. Non oliguric.  Plan: monitor for allograft function recovery  F/u Tac level  Will follow

## 2018-08-04 LAB
ANION GAP SERPL CALC-SCNC: 13 MMOL/L — SIGNIFICANT CHANGE UP (ref 5–17)
BUN SERPL-MCNC: 31 MG/DL — HIGH (ref 7–23)
CALCIUM SERPL-MCNC: 8.6 MG/DL — SIGNIFICANT CHANGE UP (ref 8.4–10.5)
CHLORIDE SERPL-SCNC: 97 MMOL/L — SIGNIFICANT CHANGE UP (ref 96–108)
CO2 SERPL-SCNC: 24 MMOL/L — SIGNIFICANT CHANGE UP (ref 22–31)
CREAT SERPL-MCNC: 6.91 MG/DL — HIGH (ref 0.5–1.3)
GLUCOSE BLDC GLUCOMTR-MCNC: 149 MG/DL — HIGH (ref 70–99)
GLUCOSE BLDC GLUCOMTR-MCNC: 170 MG/DL — HIGH (ref 70–99)
GLUCOSE BLDC GLUCOMTR-MCNC: 180 MG/DL — HIGH (ref 70–99)
GLUCOSE BLDC GLUCOMTR-MCNC: 186 MG/DL — HIGH (ref 70–99)
GLUCOSE SERPL-MCNC: 197 MG/DL — HIGH (ref 70–99)
HCT VFR BLD CALC: 26.6 % — LOW (ref 39–50)
HGB BLD-MCNC: 8.6 G/DL — LOW (ref 13–17)
MAGNESIUM SERPL-MCNC: 2.2 MG/DL — SIGNIFICANT CHANGE UP (ref 1.6–2.6)
MCHC RBC-ENTMCNC: 31 PG — SIGNIFICANT CHANGE UP (ref 27–34)
MCHC RBC-ENTMCNC: 32.3 GM/DL — SIGNIFICANT CHANGE UP (ref 32–36)
MCV RBC AUTO: 95.9 FL — SIGNIFICANT CHANGE UP (ref 80–100)
PHOSPHATE SERPL-MCNC: 3.5 MG/DL — SIGNIFICANT CHANGE UP (ref 2.5–4.5)
PLATELET # BLD AUTO: 145 K/UL — LOW (ref 150–400)
POTASSIUM SERPL-MCNC: 3.5 MMOL/L — SIGNIFICANT CHANGE UP (ref 3.5–5.3)
POTASSIUM SERPL-SCNC: 3.5 MMOL/L — SIGNIFICANT CHANGE UP (ref 3.5–5.3)
RBC # BLD: 2.78 M/UL — LOW (ref 4.2–5.8)
RBC # FLD: 18.8 % — HIGH (ref 10.3–14.5)
SODIUM SERPL-SCNC: 134 MMOL/L — LOW (ref 135–145)
TACROLIMUS SERPL-MCNC: 14.6 NG/ML — SIGNIFICANT CHANGE UP
WBC # BLD: 3.2 K/UL — LOW (ref 3.8–10.5)
WBC # FLD AUTO: 3.2 K/UL — LOW (ref 3.8–10.5)

## 2018-08-04 PROCEDURE — 99233 SBSQ HOSP IP/OBS HIGH 50: CPT | Mod: GC

## 2018-08-04 PROCEDURE — 99232 SBSQ HOSP IP/OBS MODERATE 35: CPT | Mod: GC

## 2018-08-04 RX ORDER — TACROLIMUS 5 MG/1
4 CAPSULE ORAL
Qty: 0 | Refills: 0 | Status: DISCONTINUED | OUTPATIENT
Start: 2018-08-04 | End: 2018-08-05

## 2018-08-04 RX ADMIN — Medication 500000 UNIT(S): at 06:20

## 2018-08-04 RX ADMIN — VALGANCICLOVIR 900 MILLIGRAM(S): 450 TABLET, FILM COATED ORAL at 11:11

## 2018-08-04 RX ADMIN — TAMSULOSIN HYDROCHLORIDE 0.4 MILLIGRAM(S): 0.4 CAPSULE ORAL at 21:28

## 2018-08-04 RX ADMIN — TACROLIMUS 5 MILLIGRAM(S): 5 CAPSULE ORAL at 06:20

## 2018-08-04 RX ADMIN — GABAPENTIN 300 MILLIGRAM(S): 400 CAPSULE ORAL at 11:18

## 2018-08-04 RX ADMIN — MYCOPHENOLATE MOFETIL 1000 MILLIGRAM(S): 250 CAPSULE ORAL at 17:57

## 2018-08-04 RX ADMIN — TACROLIMUS 4 MILLIGRAM(S): 5 CAPSULE ORAL at 17:58

## 2018-08-04 RX ADMIN — Medication 1 TABLET(S): at 11:11

## 2018-08-04 RX ADMIN — Medication 1: at 16:49

## 2018-08-04 RX ADMIN — MYCOPHENOLATE MOFETIL 1000 MILLIGRAM(S): 250 CAPSULE ORAL at 06:20

## 2018-08-04 RX ADMIN — Medication 500000 UNIT(S): at 17:58

## 2018-08-04 RX ADMIN — Medication 500000 UNIT(S): at 11:11

## 2018-08-04 RX ADMIN — Medication 500000 UNIT(S): at 01:01

## 2018-08-04 RX ADMIN — Medication 1: at 08:07

## 2018-08-04 NOTE — PROGRESS NOTE ADULT - PROBLEM SELECTOR PLAN 1
7/17/18 HCV + DDRT with DGF on HD 3 times weekly at home  - Anemia. Patient was using heparin during each home dialysis session prior to this admission.  - AM H/H was stable from yesterday 8.6/26.6 << 8.8/25.4  - repeat renal ultrasound today to evaluate for hematoma progression  - Continue armenta catheter to monitor output.   -BMP daily  -Replete Lytes  -Diabetic, renal diet as tolerated  -SCDs, Ambulate as tolerated.

## 2018-08-04 NOTE — PROGRESS NOTE ADULT - PROBLEM SELECTOR PLAN 1
7/17/18 HCV + DDRT with DGF on HD 3 times weekly at home  - S/P 1 units of PRBC yesterday during HD.    - repeated renal ultrasound done yesterday showing hematoma has not changed. Stable.   - Continue armenta catheter to monitor output.   -BMP daily  -Creat 6.9, not fluid overloaded, not uremic, K normal, will hold HD today and monitor.   -continue immunosuppression. 7/17/18 HCV + DDRT with DGF on HD 3 times weekly at home  - S/P 1 units of PRBC yesterday during HD.    - repeat renal ultrasound done yesterday showing hematoma has not changed. Stable.   - Continue armenta catheter to monitor output.   -BMP daily  -Creat 6.9, not fluid overloaded, not uremic, K normal, will hold HD today and monitor.   -continue immunosuppression.

## 2018-08-04 NOTE — PROGRESS NOTE ADULT - SUBJECTIVE AND OBJECTIVE BOX
[facility] DIVISION OF KIDNEY DISEASES AND HYPERTENSION -- FOLLOW UP NOTE  --------------------------------------------------------------------------------  Chief Complaint:  DDRT    24 hour events/subjective:    Pt examined at bed side, not in acute distress, not confused, armenta in place, urine noted with some blood noted.  Dialyzed yesterday with 2 lit UF. No complains.     PAST HISTORY  --------------------------------------------------------------------------------  No significant changes to PMH, PSH, FHx, SHx, unless otherwise noted    ALLERGIES & MEDICATIONS  --------------------------------------------------------------------------------  Allergies    No Known Allergies    Intolerances      Standing Inpatient Medications  dextrose 5%. 1000 milliLiter(s) IV Continuous <Continuous>  dextrose 50% Injectable 12.5 Gram(s) IV Push once  dextrose 50% Injectable 25 Gram(s) IV Push once  dextrose 50% Injectable 25 Gram(s) IV Push once  Epclusa 400-100mg 1 Tablet(s) 1 Tablet(s) Oral at bedtime  gabapentin 300 milliGRAM(s) Oral daily  insulin lispro (HumaLOG) corrective regimen sliding scale   SubCutaneous three times a day before meals  insulin lispro (HumaLOG) corrective regimen sliding scale   SubCutaneous at bedtime  metolazone 5 milliGRAM(s) Oral daily  mycophenolate mofetil 1000 milliGRAM(s) Oral two times a day  nystatin    Suspension 726455 Unit(s) Oral four times a day  tacrolimus 5 milliGRAM(s) Oral two times a day  tamsulosin 0.4 milliGRAM(s) Oral at bedtime  trimethoprim   80 mG/sulfamethoxazole 400 mG 1 Tablet(s) Oral daily  valGANciclovir 900 milliGRAM(s) Oral daily    PRN Inpatient Medications  acetaminophen   Tablet. 650 milliGRAM(s) Oral every 6 hours PRN  dextrose 40% Gel 15 Gram(s) Oral once PRN  glucagon  Injectable 1 milliGRAM(s) IntraMuscular once PRN      REVIEW OF SYSTEMS  --------------------------------------------------------------------------------  Gen: No weight changes, fatigue, fevers/chills, weakness  Skin: No rashes  Head/Eyes/Ears/Mouth: No headache; Normal hearing; Normal vision w/o blurriness;   Respiratory: No dyspnea, cough, wheezing, hemoptysis  CV: No chest pain, PND, orthopnea  GI: No abdominal pain, diarrhea, constipation, nausea, vomiting, melena, hematochezia  : No increased frequency, dysuria, hematuria, nocturia  MSK: No joint pain/swelling; no back pain; no edema  VITALS/PHYSICAL EXAM  --------------------------------------------------------------------------------  T(C): 37.2 (08-04-18 @ 09:30), Max: 37.2 (08-03-18 @ 21:47)  HR: 71 (08-04-18 @ 09:30) (71 - 82)  BP: 121/64 (08-04-18 @ 09:30) (113/69 - 144/78)  RR: 18 (08-04-18 @ 09:30) (16 - 18)  SpO2: 99% (08-04-18 @ 09:30) (96% - 100%)  Wt(kg): --    Weight (kg): 102.7 (08-03-18 @ 06:08)      08-03-18 @ 07:01  -  08-04-18 @ 07:00  --------------------------------------------------------  IN: 980 mL / OUT: 1120 mL / NET: -140 mL    08-04-18 @ 07:01  -  08-04-18 @ 09:49  --------------------------------------------------------  IN: 240 mL / OUT: 250 mL / NET: -10 mL      Physical Exam:  Gen: NAD   Pulm: CTA B/L  CV: RRR, Normal S1 S2  Abd: Well healing abdominal scar, soft, nontender, nondistended. Graft site non tender  CNS: No asterixis  : armenta+  LE: Warm, no edema  Skin: Warm, without rashes  Vascular access: Left forearm AV fistula with bruit and thrill+    LABS/STUDIES  --------------------------------------------------------------------------------              8.6    3.2   >-----------<  145      [08-04-18 @ 07:25]              26.6     134  |  97  |  31  ----------------------------<  197      [08-04-18 @ 07:25]  3.5   |  24  |  6.91        Ca     8.6     [08-04-18 @ 07:25]      Mg     2.2     [08-04-18 @ 07:25]      Phos  3.5     [08-04-18 @ 07:25]      Creatinine Trend:  SCr 6.91 [08-04 @ 07:25]  SCr 6.32 [08-03 @ 06:03]  SCr 8.53 [08-02 @ 07:02]  SCr 7.76 [08-01 @ 16:11]  SCr 9.45 [07-25 @ 17:07]

## 2018-08-04 NOTE — PROGRESS NOTE ADULT - SUBJECTIVE AND OBJECTIVE BOX
Transplant Surgery - Multidisciplinary Rounds  --------------------------------------------------------------  DDRT  7/17/18    Present:  Patient seen with multidisciplinary team (surgeon, Nephrologist, NP, Resident MD and med student)  in am rounds and examined with Dr. Castaneda. 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  The patient received a kidney from a 40M donor KDPI 30%. CIT 22 hours.  Simulect induction. He was transferred to SICU postop for hypotension and was briefly on Raymond-Synephrine and levophed. A renal duplex demonstrated good flow and no KEREN.  His post-operative course was complicated by delayed graft function requiring TIW HD and PO Lasix 80mg BID.      No acute events overnight. Renal ultrasound demonstrated mild hydronephrosis, and 10.5x2.9x8cm complex fluid collection inferior to kidney comparable with evolving hematoma. He received 30mcg DDAVP x1 and was transfused 1 unit PRBC. He was dialyzed. U/S yesterday (8/3) showed no collection size changes. H/H has been stable today and transplant medicine recommending holding HD today (not uremic or fluid overloaded).      MEDICATIONS  (STANDING):  dextrose 5%. 1000 milliLiter(s) (50 mL/Hr) IV Continuous <Continuous>  dextrose 50% Injectable 12.5 Gram(s) IV Push once  dextrose 50% Injectable 25 Gram(s) IV Push once  dextrose 50% Injectable 25 Gram(s) IV Push once  Epclusa 400-100mg 1 Tablet(s) 1 Tablet(s) Oral at bedtime  gabapentin 300 milliGRAM(s) Oral daily  insulin lispro (HumaLOG) corrective regimen sliding scale   SubCutaneous three times a day before meals  insulin lispro (HumaLOG) corrective regimen sliding scale   SubCutaneous at bedtime  metolazone 5 milliGRAM(s) Oral daily  mycophenolate mofetil 1000 milliGRAM(s) Oral two times a day  nystatin    Suspension 281821 Unit(s) Oral four times a day  tacrolimus 5 milliGRAM(s) Oral two times a day  tamsulosin 0.4 milliGRAM(s) Oral at bedtime  trimethoprim   80 mG/sulfamethoxazole 400 mG 1 Tablet(s) Oral daily  valGANciclovir 900 milliGRAM(s) Oral daily    MEDICATIONS  (PRN):  dextrose 40% Gel 15 Gram(s) Oral once PRN Blood Glucose LESS THAN 70 milliGRAM(s)/deciliter  glucagon  Injectable 1 milliGRAM(s) IntraMuscular once PRN Glucose LESS THAN 70 milligrams/deciliter      PAST MEDICAL & SURGICAL HISTORY:  ESRD (end stage renal disease) on dialysis  Kidney cancer, primary, with metastasis from kidney to other site: with no mets  HTN (hypertension)  DM (diabetes mellitus)  S/p nephrectomy: right 12/10/2015  S/p nephrectomy: left 9/15/2015  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      Vital Signs Last 24 Hrs  T(C): 37.2 (04 Aug 2018 09:30), Max: 37.2 (03 Aug 2018 21:47)  T(F): 98.9 (04 Aug 2018 09:30), Max: 98.9 (03 Aug 2018 21:47)  HR: 71 (04 Aug 2018 09:30) (71 - 82)  BP: 121/64 (04 Aug 2018 09:30) (120/63 - 144/78)  BP(mean): --  RR: 18 (04 Aug 2018 09:30) (16 - 18)  SpO2: 99% (04 Aug 2018 09:30) (97% - 100%)    I&O's Summary    03 Aug 2018 07:01  -  04 Aug 2018 07:00  --------------------------------------------------------  IN: 980 mL / OUT: 1120 mL / NET: -140 mL    04 Aug 2018 07:01  -  04 Aug 2018 12:15  --------------------------------------------------------  IN: 264 mL / OUT: 250 mL / NET: 14 mL                                            8.6    3.2   )-----------( 145      ( 04 Aug 2018 07:25 )             26.6   08-04    134<L>  |  97  |  31<H>  ----------------------------<  197<H>  3.5   |  24  |  6.91<H>    Ca    8.6      04 Aug 2018 07:25  Phos  3.5     08-04  Mg     2.2     08-04          Review of systems  Gen: No weight changes, fatigue, fevers/chills, weakness  Skin: No rashes  Head/Eyes/Ears/Mouth: No headache; Normal hearing; Normal vision w/o blurriness; No sinus pain/discomfort, sore throat  Respiratory: No dyspnea, cough, wheezing, hemoptysis  CV: No chest pain, PND, orthopnea  GI: No abdominal pain, diarrhea, constipation, nausea, vomiting, melena, hematochezia  : No increased frequency, dysuria, hematuria, nocturia  MSK: No joint pain/swelling; no back pain;  Neuro: No dizziness/lightheadedness, weakness, seizures, numbness, tingling  Heme: No easy bruising or bleeding  Endo: No heat/cold intolerance  Psych: No significant nervousness, anxiety, stress, depression  All other systems were reviewed and are negative, except as noted.    PHYSICAL EXAM:  Constitutional: Well developed / well nourished  Eyes: Anicteric, PERRLA  ENMT: nc/at  Neck: supple  Respiratory: CTA B/L  Cardiovascular: RRR  Gastrointestinal: Soft abdomen, ND, NT.  Genitourinary: Voiding spontaneously  Extremities: SCD's in place and working bilaterally. no edema  Vascular: Palpable dp pulses bilaterally  Neurological: A&O x3  Skin: Wound with staples, open to air no erythema and evidence of infection noted  Musculoskeletal: Moving all extremities  Psychiatric: Responsive

## 2018-08-05 LAB
ANION GAP SERPL CALC-SCNC: 14 MMOL/L — SIGNIFICANT CHANGE UP (ref 5–17)
BUN SERPL-MCNC: 38 MG/DL — HIGH (ref 7–23)
CALCIUM SERPL-MCNC: 8.7 MG/DL — SIGNIFICANT CHANGE UP (ref 8.4–10.5)
CHLORIDE SERPL-SCNC: 100 MMOL/L — SIGNIFICANT CHANGE UP (ref 96–108)
CO2 SERPL-SCNC: 24 MMOL/L — SIGNIFICANT CHANGE UP (ref 22–31)
CREAT SERPL-MCNC: 7.66 MG/DL — HIGH (ref 0.5–1.3)
GLUCOSE BLDC GLUCOMTR-MCNC: 138 MG/DL — HIGH (ref 70–99)
GLUCOSE BLDC GLUCOMTR-MCNC: 167 MG/DL — HIGH (ref 70–99)
GLUCOSE BLDC GLUCOMTR-MCNC: 171 MG/DL — HIGH (ref 70–99)
GLUCOSE BLDC GLUCOMTR-MCNC: 238 MG/DL — HIGH (ref 70–99)
GLUCOSE SERPL-MCNC: 178 MG/DL — HIGH (ref 70–99)
HCT VFR BLD CALC: 25.6 % — LOW (ref 39–50)
HGB BLD-MCNC: 8.8 G/DL — LOW (ref 13–17)
MAGNESIUM SERPL-MCNC: 1.9 MG/DL — SIGNIFICANT CHANGE UP (ref 1.6–2.6)
MCHC RBC-ENTMCNC: 32.9 PG — SIGNIFICANT CHANGE UP (ref 27–34)
MCHC RBC-ENTMCNC: 34.5 GM/DL — SIGNIFICANT CHANGE UP (ref 32–36)
MCV RBC AUTO: 95.2 FL — SIGNIFICANT CHANGE UP (ref 80–100)
PHOSPHATE SERPL-MCNC: 3.7 MG/DL — SIGNIFICANT CHANGE UP (ref 2.5–4.5)
PLATELET # BLD AUTO: 125 K/UL — LOW (ref 150–400)
POTASSIUM SERPL-MCNC: 3.5 MMOL/L — SIGNIFICANT CHANGE UP (ref 3.5–5.3)
POTASSIUM SERPL-SCNC: 3.5 MMOL/L — SIGNIFICANT CHANGE UP (ref 3.5–5.3)
RBC # BLD: 2.69 M/UL — LOW (ref 4.2–5.8)
RBC # FLD: 18.2 % — HIGH (ref 10.3–14.5)
SODIUM SERPL-SCNC: 138 MMOL/L — SIGNIFICANT CHANGE UP (ref 135–145)
TACROLIMUS SERPL-MCNC: 13.7 NG/ML — SIGNIFICANT CHANGE UP
WBC # BLD: 2.5 K/UL — LOW (ref 3.8–10.5)
WBC # FLD AUTO: 2.5 K/UL — LOW (ref 3.8–10.5)

## 2018-08-05 PROCEDURE — 99233 SBSQ HOSP IP/OBS HIGH 50: CPT | Mod: GC

## 2018-08-05 PROCEDURE — 99232 SBSQ HOSP IP/OBS MODERATE 35: CPT | Mod: GC

## 2018-08-05 RX ORDER — TACROLIMUS 5 MG/1
3 CAPSULE ORAL
Qty: 0 | Refills: 0 | Status: DISCONTINUED | OUTPATIENT
Start: 2018-08-05 | End: 2018-08-07

## 2018-08-05 RX ORDER — LOPERAMIDE HCL 2 MG
2 TABLET ORAL DAILY
Qty: 0 | Refills: 0 | Status: DISCONTINUED | OUTPATIENT
Start: 2018-08-05 | End: 2018-08-09

## 2018-08-05 RX ADMIN — TACROLIMUS 3 MILLIGRAM(S): 5 CAPSULE ORAL at 17:14

## 2018-08-05 RX ADMIN — VALGANCICLOVIR 900 MILLIGRAM(S): 450 TABLET, FILM COATED ORAL at 11:09

## 2018-08-05 RX ADMIN — Medication 1: at 16:50

## 2018-08-05 RX ADMIN — Medication 1 TABLET(S): at 11:09

## 2018-08-05 RX ADMIN — Medication 500000 UNIT(S): at 06:18

## 2018-08-05 RX ADMIN — TACROLIMUS 4 MILLIGRAM(S): 5 CAPSULE ORAL at 06:30

## 2018-08-05 RX ADMIN — Medication 1: at 08:03

## 2018-08-05 RX ADMIN — Medication 500000 UNIT(S): at 11:10

## 2018-08-05 RX ADMIN — Medication 500000 UNIT(S): at 21:29

## 2018-08-05 RX ADMIN — GABAPENTIN 300 MILLIGRAM(S): 400 CAPSULE ORAL at 11:09

## 2018-08-05 RX ADMIN — Medication 500000 UNIT(S): at 06:31

## 2018-08-05 RX ADMIN — Medication 500000 UNIT(S): at 17:14

## 2018-08-05 RX ADMIN — MYCOPHENOLATE MOFETIL 1000 MILLIGRAM(S): 250 CAPSULE ORAL at 06:31

## 2018-08-05 RX ADMIN — Medication 2 MILLIGRAM(S): at 11:56

## 2018-08-05 RX ADMIN — TAMSULOSIN HYDROCHLORIDE 0.4 MILLIGRAM(S): 0.4 CAPSULE ORAL at 21:28

## 2018-08-05 RX ADMIN — MYCOPHENOLATE MOFETIL 1000 MILLIGRAM(S): 250 CAPSULE ORAL at 17:14

## 2018-08-05 NOTE — PROVIDER CONTACT NOTE (OTHER) - ASSESSMENT
pt alox3 able to make needs known vs stable . very scant serosanguineous drainage noted on incision site

## 2018-08-05 NOTE — PROGRESS NOTE ADULT - SUBJECTIVE AND OBJECTIVE BOX
[facility] DIVISION OF KIDNEY DISEASES AND HYPERTENSION -- FOLLOW UP NOTE  --------------------------------------------------------------------------------  Chief Complaint:  DDRT  24 hour events/subjective:    Pt examined at bed side, not in acute distress, not confused, armenta in place, with good urine output. No overnight events.       PAST HISTORY  --------------------------------------------------------------------------------  No significant changes to PMH, PSH, FHx, SHx, unless otherwise noted    ALLERGIES & MEDICATIONS  --------------------------------------------------------------------------------  Allergies    No Known Allergies    Intolerances      Standing Inpatient Medications  dextrose 5%. 1000 milliLiter(s) IV Continuous <Continuous>  dextrose 50% Injectable 12.5 Gram(s) IV Push once  dextrose 50% Injectable 25 Gram(s) IV Push once  dextrose 50% Injectable 25 Gram(s) IV Push once  Epclusa 400-100mg 1 Tablet(s) 1 Tablet(s) Oral at bedtime  gabapentin 300 milliGRAM(s) Oral daily  insulin lispro (HumaLOG) corrective regimen sliding scale   SubCutaneous three times a day before meals  insulin lispro (HumaLOG) corrective regimen sliding scale   SubCutaneous at bedtime  loperamide 2 milliGRAM(s) Oral daily  metolazone 5 milliGRAM(s) Oral daily  mycophenolate mofetil 1000 milliGRAM(s) Oral two times a day  nystatin    Suspension 914224 Unit(s) Oral four times a day  tacrolimus 3 milliGRAM(s) Oral two times a day  tamsulosin 0.4 milliGRAM(s) Oral at bedtime  trimethoprim   80 mG/sulfamethoxazole 400 mG 1 Tablet(s) Oral daily  valGANciclovir 900 milliGRAM(s) Oral daily    PRN Inpatient Medications  acetaminophen   Tablet. 650 milliGRAM(s) Oral every 6 hours PRN  dextrose 40% Gel 15 Gram(s) Oral once PRN  glucagon  Injectable 1 milliGRAM(s) IntraMuscular once PRN      REVIEW OF SYSTEMS  --------------------------------------------------------------------------------  Gen: No weight changes, fatigue, fevers/chills, weakness  Skin: No rashes  Head/Eyes/Ears/Mouth: No headache; Normal hearing; Normal vision w/o blurriness;   Respiratory: No dyspnea, cough, wheezing, hemoptysis  CV: No chest pain, PND, orthopnea  GI: No abdominal pain, diarrhea, constipation, nausea, vomiting, melena, hematochezia  : No increased frequency, dysuria, hematuria, nocturia  MSK: No joint pain/swelling; no back pain; no edema    All other systems were reviewed and are negative, except as noted.    VITALS/PHYSICAL EXAM  --------------------------------------------------------------------------------  T(C): 37.1 (08-05-18 @ 13:29), Max: 37.2 (08-04-18 @ 17:42)  HR: 79 (08-05-18 @ 13:29) (73 - 89)  BP: 145/80 (08-05-18 @ 13:29) (117/58 - 147/80)  RR: 18 (08-05-18 @ 13:29) (18 - 18)  SpO2: 100% (08-05-18 @ 13:29) (95% - 100%)  Wt(kg): --        08-04-18 @ 07:01  -  08-05-18 @ 07:00  --------------------------------------------------------  IN: 1080 mL / OUT: 1375 mL / NET: -295 mL    08-05-18 @ 07:01  -  08-05-18 @ 13:50  --------------------------------------------------------  IN: 480 mL / OUT: 350 mL / NET: 130 mL      Physical Exam:  Gen: NAD   Pulm: CTA B/L  CV: RRR, Normal S1 S2  Abd: Well healing abdominal scar, soft, nontender, nondistended. Graft site non tender  CNS: No asterixis  : geovany+  LE: Warm, no edema  Skin: Warm, without rashes  Vascular access: Left forearm AV fistula with bruit and thrill+    LABS/STUDIES  --------------------------------------------------------------------------------              8.8    2.5   >-----------<  125      [08-05-18 @ 06:18]              25.6     138  |  100  |  38  ----------------------------<  178      [08-05-18 @ 06:18]  3.5   |  24  |  7.66        Ca     8.7     [08-05-18 @ 06:18]      Mg     1.9     [08-05-18 @ 06:18]      Phos  3.7     [08-05-18 @ 06:18]      Creatinine Trend:  SCr 7.66 [08-05 @ 06:18]  SCr 6.91 [08-04 @ 07:25]  SCr 6.32 [08-03 @ 06:03]  SCr 8.53 [08-02 @ 07:02]  SCr 7.76 [08-01 @ 16:11]        HbA1c 6.0      [07-17-18 @ 16:53]    HBsAb 5.6      [07-18-18 @ 19:20]  HBsAg Nonreact      [07-18-18 @ 19:20]  HBcAb Nonreact      [07-18-18 @ 19:20]  HCV 0.27, Nonreact      [07-18-18 @ 19:20]

## 2018-08-05 NOTE — PROGRESS NOTE ADULT - PROBLEM SELECTOR PLAN 1
7/17/18 HCV + DDRT with DGF on HD 3 times weekly at home  -Hb Stable.   - Continue armenta catheter to monitor output.   -BMP daily  -Creat trending up,  not fluid overloaded, not uremic, K normal, will hold HD today and monitor.   -continue immunosuppression.  -Tacrolimus level today 13 decrease dose to 2mg and re-check level. 7/17/18 HCV + DDRT with DGF on HD 3 times weekly at home  -Hb Stable.   - Continue armenta catheter to monitor output.   -BMP daily  -Creat trending up, but with good UO, not fluid overloaded, not uremic, K normal, will hold HD today and monitor.   -continue immunosuppression.  -Tacrolimus level today 13 decrease dose to 2mg and re-check level.

## 2018-08-05 NOTE — PROVIDER CONTACT NOTE (OTHER) - BACKGROUND
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

## 2018-08-05 NOTE — PROGRESS NOTE ADULT - SUBJECTIVE AND OBJECTIVE BOX
Transplant Surgery - Multidisciplinary Rounds  --------------------------------------------------------------  DDRT  7/17/18    Present:  Patient seen with multidisciplinary team (surgeon, Nephrologist, NP, Resident MD and med student)  in am rounds and examined with Dr. Castaneda. 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  The patient received a kidney from a 40M donor KDPI 30%. CIT 22 hours.  Simulect induction. He was transferred to SICU postop for hypotension and was briefly on Raymond-Synephrine and levophed. A renal duplex demonstrated good flow and no KEREN.  His post-operative course was complicated by delayed graft function requiring TIW HD and PO Lasix 80mg BID.      No acute events overnight. Denies pain. Notes that he has been having some loose stools overnight.    Vital Signs Last 24 Hrs  T(C): 36.9 (08-05-18 @ 09:50), Max: 37.2 (08-04-18 @ 17:42)  T(F): 98.4 (08-05-18 @ 09:50), Max: 99 (08-04-18 @ 17:42)  HR: 86 (08-05-18 @ 09:50) (73 - 89)  BP: 147/80 (08-05-18 @ 09:50) (117/58 - 147/80)  BP(mean): --  RR: 18 (08-05-18 @ 09:50) (18 - 18)  SpO2: 96% (08-05-18 @ 09:50) (95% - 99%)  I&O's Detail    04 Aug 2018 07:01  -  05 Aug 2018 07:00  --------------------------------------------------------  IN:    Oral Fluid: 1080 mL  Total IN: 1080 mL    OUT:    Indwelling Catheter - Urethral: 1375 mL  Total OUT: 1375 mL    Total NET: -295 mL      05 Aug 2018 07:01  -  05 Aug 2018 10:37  --------------------------------------------------------  IN:    Oral Fluid: 240 mL  Total IN: 240 mL    OUT:    Indwelling Catheter - Urethral: 200 mL  Total OUT: 200 mL    Total NET: 40 mL                              8.8    2.5   )-----------( 125      ( 05 Aug 2018 06:18 )             25.6     08-05    138  |  100  |  38<H>  ----------------------------<  178<H>  3.5   |  24  |  7.66<H>    Ca    8.7      05 Aug 2018 06:18  Phos  3.7     08-05  Mg     1.9     08-05        CAPILLARY BLOOD GLUCOSE      POCT Blood Glucose.: 167 mg/dL (05 Aug 2018 07:58)  POCT Blood Glucose.: 186 mg/dL (04 Aug 2018 21:31)  POCT Blood Glucose.: 170 mg/dL (04 Aug 2018 16:29)  POCT Blood Glucose.: 149 mg/dL (04 Aug 2018 11:54)      MEDICATIONS  (STANDING):  dextrose 5%. 1000 milliLiter(s) (50 mL/Hr) IV Continuous <Continuous>  dextrose 50% Injectable 12.5 Gram(s) IV Push once  dextrose 50% Injectable 25 Gram(s) IV Push once  dextrose 50% Injectable 25 Gram(s) IV Push once  Epclusa 400-100mg 1 Tablet(s) 1 Tablet(s) Oral at bedtime  gabapentin 300 milliGRAM(s) Oral daily  insulin lispro (HumaLOG) corrective regimen sliding scale   SubCutaneous three times a day before meals  insulin lispro (HumaLOG) corrective regimen sliding scale   SubCutaneous at bedtime  metolazone 5 milliGRAM(s) Oral daily  mycophenolate mofetil 1000 milliGRAM(s) Oral two times a day  nystatin    Suspension [id number] Unit(s) Oral four times a day  tacrolimus 4 milliGRAM(s) Oral two times a day  tamsulosin 0.4 milliGRAM(s) Oral at bedtime  trimethoprim   80 mG/sulfamethoxazole 400 mG 1 Tablet(s) Oral daily  valGANciclovir 900 milliGRAM(s) Oral daily    MEDICATIONS  (PRN):  acetaminophen   Tablet. 650 milliGRAM(s) Oral every 6 hours PRN Mild Pain (1 - 3)  dextrose 40% Gel 15 Gram(s) Oral once PRN Blood Glucose LESS THAN 70 milliGRAM(s)/deciliter  glucagon  Injectable 1 milliGRAM(s) IntraMuscular once PRN Glucose LESS THAN 70 milligrams/deciliter      Review of systems  Gen: No weight changes, fatigue, fevers/chills, weakness  Skin: No rashes  Head/Eyes/Ears/Mouth: No headache; Normal hearing; Normal vision w/o blurriness; No sinus pain/discomfort, sore throat  Respiratory: No dyspnea, cough, wheezing, hemoptysis  CV: No chest pain, PND, orthopnea  GI: No abdominal pain, diarrhea, constipation, nausea, vomiting, melena, hematochezia  : No increased frequency, dysuria, hematuria, nocturia  MSK: No joint pain/swelling; no back pain;  Neuro: No dizziness/lightheadedness, weakness, seizures, numbness, tingling  Heme: No easy bruising or bleeding  Endo: No heat/cold intolerance  Psych: No significant nervousness, anxiety, stress, depression  All other systems were reviewed and are negative, except as noted.    PHYSICAL EXAM:  Constitutional: Well developed / well nourished  Eyes: Anicteric, PERRLA  ENMT: nc/at  Neck: supple  Respiratory: CTA B/L  Cardiovascular: RRR  Gastrointestinal: Soft abdomen, ND, NT.  Genitourinary: Voiding spontaneously  Extremities: SCD's in place and working bilaterally. no edema  Vascular: Palpable dp pulses bilaterally  Neurological: A&O x3  Skin: Wound with staples, open to air no erythema and evidence of infection noted  Musculoskeletal: Moving all extremities  Psychiatric: Responsive

## 2018-08-05 NOTE — PROGRESS NOTE ADULT - PROBLEM SELECTOR PLAN 2
-Continue nystatin, Bactrim, Valcyte 900mg daily, Tacrolimus  -Tacrolimus troughs daily, goal 8-10  -Continue Epclusa 2/2 HCV + donor

## 2018-08-06 LAB
ANION GAP SERPL CALC-SCNC: 14 MMOL/L — SIGNIFICANT CHANGE UP (ref 5–17)
BUN SERPL-MCNC: 48 MG/DL — HIGH (ref 7–23)
CALCIUM SERPL-MCNC: 8.9 MG/DL — SIGNIFICANT CHANGE UP (ref 8.4–10.5)
CHLORIDE SERPL-SCNC: 98 MMOL/L — SIGNIFICANT CHANGE UP (ref 96–108)
CO2 SERPL-SCNC: 23 MMOL/L — SIGNIFICANT CHANGE UP (ref 22–31)
CREAT SERPL-MCNC: 8.44 MG/DL — HIGH (ref 0.5–1.3)
GLUCOSE BLDC GLUCOMTR-MCNC: 166 MG/DL — HIGH (ref 70–99)
GLUCOSE BLDC GLUCOMTR-MCNC: 198 MG/DL — HIGH (ref 70–99)
GLUCOSE BLDC GLUCOMTR-MCNC: 202 MG/DL — HIGH (ref 70–99)
GLUCOSE BLDC GLUCOMTR-MCNC: 237 MG/DL — HIGH (ref 70–99)
GLUCOSE SERPL-MCNC: 183 MG/DL — HIGH (ref 70–99)
HCT VFR BLD CALC: 29.4 % — LOW (ref 39–50)
HGB BLD-MCNC: 9.8 G/DL — LOW (ref 13–17)
MAGNESIUM SERPL-MCNC: 1.8 MG/DL — SIGNIFICANT CHANGE UP (ref 1.6–2.6)
MCHC RBC-ENTMCNC: 32.5 PG — SIGNIFICANT CHANGE UP (ref 27–34)
MCHC RBC-ENTMCNC: 33.3 GM/DL — SIGNIFICANT CHANGE UP (ref 32–36)
MCV RBC AUTO: 97.7 FL — SIGNIFICANT CHANGE UP (ref 80–100)
PHOSPHATE SERPL-MCNC: 4.4 MG/DL — SIGNIFICANT CHANGE UP (ref 2.5–4.5)
PLATELET # BLD AUTO: 143 K/UL — LOW (ref 150–400)
POTASSIUM SERPL-MCNC: 3.5 MMOL/L — SIGNIFICANT CHANGE UP (ref 3.5–5.3)
POTASSIUM SERPL-SCNC: 3.5 MMOL/L — SIGNIFICANT CHANGE UP (ref 3.5–5.3)
RBC # BLD: 3.01 M/UL — LOW (ref 4.2–5.8)
RBC # FLD: 17.8 % — HIGH (ref 10.3–14.5)
SODIUM SERPL-SCNC: 135 MMOL/L — SIGNIFICANT CHANGE UP (ref 135–145)
TACROLIMUS SERPL-MCNC: 10.5 NG/ML — SIGNIFICANT CHANGE UP
WBC # BLD: 1.9 K/UL — LOW (ref 3.8–10.5)
WBC # FLD AUTO: 1.9 K/UL — LOW (ref 3.8–10.5)

## 2018-08-06 PROCEDURE — 99232 SBSQ HOSP IP/OBS MODERATE 35: CPT | Mod: GC

## 2018-08-06 RX ADMIN — Medication 1 TABLET(S): at 12:19

## 2018-08-06 RX ADMIN — Medication 5 MILLIGRAM(S): at 12:19

## 2018-08-06 RX ADMIN — Medication 2: at 12:19

## 2018-08-06 RX ADMIN — Medication 500000 UNIT(S): at 06:02

## 2018-08-06 RX ADMIN — VALGANCICLOVIR 900 MILLIGRAM(S): 450 TABLET, FILM COATED ORAL at 12:19

## 2018-08-06 RX ADMIN — Medication 500000 UNIT(S): at 17:11

## 2018-08-06 RX ADMIN — Medication 500000 UNIT(S): at 23:49

## 2018-08-06 RX ADMIN — Medication 1: at 08:11

## 2018-08-06 RX ADMIN — GABAPENTIN 300 MILLIGRAM(S): 400 CAPSULE ORAL at 12:19

## 2018-08-06 RX ADMIN — Medication 1: at 17:11

## 2018-08-06 RX ADMIN — Medication 2 MILLIGRAM(S): at 12:19

## 2018-08-06 RX ADMIN — TACROLIMUS 3 MILLIGRAM(S): 5 CAPSULE ORAL at 17:11

## 2018-08-06 RX ADMIN — TACROLIMUS 3 MILLIGRAM(S): 5 CAPSULE ORAL at 06:03

## 2018-08-06 RX ADMIN — MYCOPHENOLATE MOFETIL 1000 MILLIGRAM(S): 250 CAPSULE ORAL at 06:03

## 2018-08-06 RX ADMIN — Medication 500000 UNIT(S): at 12:19

## 2018-08-06 RX ADMIN — TAMSULOSIN HYDROCHLORIDE 0.4 MILLIGRAM(S): 0.4 CAPSULE ORAL at 21:30

## 2018-08-06 NOTE — PROGRESS NOTE ADULT - ATTENDING COMMENTS
CHELA due to ATN of transplant  hold dialysis today as lytes ok and pt euvolemic.  Reassess renal function tomorrow. CHELA due to ATN of transplant  hold dialysis today as lytes ok and pt euvolemic.  Reassess renal function tomorrow.  Pt leukopenic on valcyte 900mgs daily which is too high dose - will hold for now.  Resume 450 TIW when leukopenia improves.

## 2018-08-06 NOTE — PROGRESS NOTE ADULT - PROBLEM SELECTOR PLAN 1
7/17/18 HCV + DDRT with DGF on HD 3 times weekly at home  -Hb Stable.   -BMP daily  -Creat trending up, but with good UO, not fluid overloaded, not uremic, K normal, will hold HD today and monitor.   -continue immunosuppression.

## 2018-08-06 NOTE — PROVIDER CONTACT NOTE (OTHER) - ASSESSMENT
Pt A&Ox4, able to make needs known. Scant serosanginous drainage @ RLQ incision. Pt states site is sore & tender to touch.

## 2018-08-06 NOTE — PROGRESS NOTE ADULT - SUBJECTIVE AND OBJECTIVE BOX
Transplant Surgery - Multidisciplinary Rounds  --------------------------------------------------------------  DDRT  7/17/18    Present:  Patient seen with multidisciplinary team (surgeon, Nephrologist, Resident MD and med student)  in am rounds and examined with Dr. Castaneda. 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  The patient received a kidney from a 40M donor KDPI 30%. CIT 22 hours.  Simulect induction. He was transferred to SICU postop for hypotension and was briefly on Raymond-Synephrine and levophed. A renal duplex demonstrated good flow and no KEREN.  His post-operative course was complicated by delayed graft function requiring TIW HD and PO Lasix 80mg BID.      No acute events overnight. Denies pain. Restarted prednisone today. Holding dialysis today.     Vital Signs Last 24 Hrs  T(C): 37.3 (06 Aug 2018 10:01), Max: 37.3 (06 Aug 2018 10:01)  T(F): 99.2 (06 Aug 2018 10:01), Max: 99.2 (06 Aug 2018 10:01)  HR: 85 (06 Aug 2018 10:01) (78 - 87)  BP: 105/60 (06 Aug 2018 10:01) (105/60 - 145/83)  BP(mean): --  RR: 18 (06 Aug 2018 10:01) (18 - 18)  SpO2: 97% (06 Aug 2018 10:01) (95% - 100%)    I&O's Detail    05 Aug 2018 07:01  -  06 Aug 2018 07:00  --------------------------------------------------------  IN:    Oral Fluid: 1380 mL  Total IN: 1380 mL    OUT:    Indwelling Catheter - Urethral: 350 mL    Voided: 1200 mL  Total OUT: 1550 mL    Total NET: -170 mL      06 Aug 2018 07:01  -  06 Aug 2018 10:11  --------------------------------------------------------  IN:    Oral Fluid: 240 mL  Total IN: 240 mL    OUT:    Voided: 250 mL  Total OUT: 250 mL    Total NET: -10 mL                            9.8    1.9   )-----------( 143      ( 06 Aug 2018 06:12 )             29.4     08-06    135  |  98  |  48<H>  ----------------------------<  183<H>  3.5   |  23  |  8.44<H>    Ca    8.9      06 Aug 2018 06:12  Phos  4.4     08-06  Mg     1.8     08-06        CAPILLARY BLOOD GLUCOSE      POCT Blood Glucose.: 166 mg/dL (06 Aug 2018 07:52)  POCT Blood Glucose.: 238 mg/dL (05 Aug 2018 21:29)  POCT Blood Glucose.: 171 mg/dL (05 Aug 2018 16:39)  POCT Blood Glucose.: 138 mg/dL (05 Aug 2018 12:11)        MEDICATIONS  (STANDING):  dextrose 5%. 1000 milliLiter(s) (50 mL/Hr) IV Continuous <Continuous>  dextrose 50% Injectable 12.5 Gram(s) IV Push once  dextrose 50% Injectable 25 Gram(s) IV Push once  dextrose 50% Injectable 25 Gram(s) IV Push once  Epclusa 400-100mg 1 Tablet(s) 1 Tablet(s) Oral at bedtime  gabapentin 300 milliGRAM(s) Oral daily  insulin lispro (HumaLOG) corrective regimen sliding scale   SubCutaneous three times a day before meals  insulin lispro (HumaLOG) corrective regimen sliding scale   SubCutaneous at bedtime  metolazone 5 milliGRAM(s) Oral daily  mycophenolate mofetil 1000 milliGRAM(s) Oral two times a day  nystatin    Suspension [id number] Unit(s) Oral four times a day  tacrolimus 4 milliGRAM(s) Oral two times a day  tamsulosin 0.4 milliGRAM(s) Oral at bedtime  trimethoprim   80 mG/sulfamethoxazole 400 mG 1 Tablet(s) Oral daily  valGANciclovir 900 milliGRAM(s) Oral daily    MEDICATIONS  (PRN):  acetaminophen   Tablet. 650 milliGRAM(s) Oral every 6 hours PRN Mild Pain (1 - 3)  dextrose 40% Gel 15 Gram(s) Oral once PRN Blood Glucose LESS THAN 70 milliGRAM(s)/deciliter  glucagon  Injectable 1 milliGRAM(s) IntraMuscular once PRN Glucose LESS THAN 70 milligrams/deciliter      Review of systems  Gen: No weight changes, fatigue, fevers/chills, weakness  Skin: No rashes  Head/Eyes/Ears/Mouth: No headache; Normal hearing; Normal vision w/o blurriness; No sinus pain/discomfort, sore throat  Respiratory: No dyspnea, cough, wheezing, hemoptysis  CV: No chest pain, PND, orthopnea  GI: No abdominal pain, diarrhea, constipation, nausea, vomiting, melena, hematochezia  : No increased frequency, dysuria, hematuria, nocturia  MSK: No joint pain/swelling; no back pain;  Neuro: No dizziness/lightheadedness, weakness, seizures, numbness, tingling  Heme: No easy bruising or bleeding  Endo: No heat/cold intolerance  Psych: No significant nervousness, anxiety, stress, depression  All other systems were reviewed and are negative, except as noted.    PHYSICAL EXAM:  Constitutional: Well developed / well nourished  Eyes: Anicteric, PERRLA  ENMT: nc/at  Neck: supple  Respiratory: CTA B/L  Cardiovascular: RRR  Gastrointestinal: Soft abdomen, ND, NT.  Genitourinary: Voiding spontaneously  Extremities: SCD's in place and working bilaterally. no edema  Vascular: Palpable dp pulses bilaterally  Neurological: A&O x3  Skin: Wound with staples, open to air no erythema and evidence of infection noted  Musculoskeletal: Moving all extremities  Psychiatric: Responsive

## 2018-08-06 NOTE — PROGRESS NOTE ADULT - PROBLEM SELECTOR PLAN 2
Pt with stable hematoma, now s/p 3 U PRBC transfusion.  pt was using heparin in dialysate at home.  H/H now stable. Monitor H/H.

## 2018-08-07 LAB
ANION GAP SERPL CALC-SCNC: 16 MMOL/L — SIGNIFICANT CHANGE UP (ref 5–17)
BUN SERPL-MCNC: 52 MG/DL — HIGH (ref 7–23)
CALCIUM SERPL-MCNC: 8.8 MG/DL — SIGNIFICANT CHANGE UP (ref 8.4–10.5)
CHLORIDE SERPL-SCNC: 101 MMOL/L — SIGNIFICANT CHANGE UP (ref 96–108)
CO2 SERPL-SCNC: 21 MMOL/L — LOW (ref 22–31)
CREAT SERPL-MCNC: 9.78 MG/DL — HIGH (ref 0.5–1.3)
GLUCOSE BLDC GLUCOMTR-MCNC: 167 MG/DL — HIGH (ref 70–99)
GLUCOSE BLDC GLUCOMTR-MCNC: 180 MG/DL — HIGH (ref 70–99)
GLUCOSE BLDC GLUCOMTR-MCNC: 202 MG/DL — HIGH (ref 70–99)
GLUCOSE BLDC GLUCOMTR-MCNC: 276 MG/DL — HIGH (ref 70–99)
GLUCOSE SERPL-MCNC: 170 MG/DL — HIGH (ref 70–99)
HCT VFR BLD CALC: 25.6 % — LOW (ref 39–50)
HGB BLD-MCNC: 9.1 G/DL — LOW (ref 13–17)
MAGNESIUM SERPL-MCNC: 1.6 MG/DL — SIGNIFICANT CHANGE UP (ref 1.6–2.6)
MCHC RBC-ENTMCNC: 34.4 PG — HIGH (ref 27–34)
MCHC RBC-ENTMCNC: 35.5 GM/DL — SIGNIFICANT CHANGE UP (ref 32–36)
MCV RBC AUTO: 96.8 FL — SIGNIFICANT CHANGE UP (ref 80–100)
PHOSPHATE SERPL-MCNC: 4.3 MG/DL — SIGNIFICANT CHANGE UP (ref 2.5–4.5)
PLATELET # BLD AUTO: 145 K/UL — LOW (ref 150–400)
POTASSIUM SERPL-MCNC: 3.5 MMOL/L — SIGNIFICANT CHANGE UP (ref 3.5–5.3)
POTASSIUM SERPL-SCNC: 3.5 MMOL/L — SIGNIFICANT CHANGE UP (ref 3.5–5.3)
RBC # BLD: 2.64 M/UL — LOW (ref 4.2–5.8)
RBC # FLD: 17.4 % — HIGH (ref 10.3–14.5)
SODIUM SERPL-SCNC: 138 MMOL/L — SIGNIFICANT CHANGE UP (ref 135–145)
TACROLIMUS SERPL-MCNC: 9.7 NG/ML — SIGNIFICANT CHANGE UP
WBC # BLD: 1.9 K/UL — LOW (ref 3.8–10.5)
WBC # FLD AUTO: 1.9 K/UL — LOW (ref 3.8–10.5)

## 2018-08-07 PROCEDURE — 99232 SBSQ HOSP IP/OBS MODERATE 35: CPT | Mod: GC

## 2018-08-07 RX ORDER — FILGRASTIM 480MCG/1.6
300 VIAL (ML) INJECTION ONCE
Qty: 0 | Refills: 0 | Status: COMPLETED | OUTPATIENT
Start: 2018-08-07 | End: 2018-08-07

## 2018-08-07 RX ORDER — FILGRASTIM 480MCG/1.6
300 VIAL (ML) INJECTION ONCE
Qty: 0 | Refills: 0 | Status: DISCONTINUED | OUTPATIENT
Start: 2018-08-07 | End: 2018-08-07

## 2018-08-07 RX ORDER — TACROLIMUS 5 MG/1
2.5 CAPSULE ORAL
Qty: 0 | Refills: 0 | Status: DISCONTINUED | OUTPATIENT
Start: 2018-08-07 | End: 2018-08-09

## 2018-08-07 RX ORDER — FUROSEMIDE 40 MG
40 TABLET ORAL
Qty: 0 | Refills: 0 | Status: DISCONTINUED | OUTPATIENT
Start: 2018-08-07 | End: 2018-08-10

## 2018-08-07 RX ADMIN — Medication 500000 UNIT(S): at 06:06

## 2018-08-07 RX ADMIN — Medication 1: at 08:16

## 2018-08-07 RX ADMIN — GABAPENTIN 300 MILLIGRAM(S): 400 CAPSULE ORAL at 12:23

## 2018-08-07 RX ADMIN — Medication 3: at 12:23

## 2018-08-07 RX ADMIN — TACROLIMUS 3 MILLIGRAM(S): 5 CAPSULE ORAL at 06:06

## 2018-08-07 RX ADMIN — Medication 5 MILLIGRAM(S): at 06:08

## 2018-08-07 RX ADMIN — Medication 1 TABLET(S): at 12:24

## 2018-08-07 RX ADMIN — Medication 500000 UNIT(S): at 23:59

## 2018-08-07 RX ADMIN — Medication 1: at 16:59

## 2018-08-07 RX ADMIN — TAMSULOSIN HYDROCHLORIDE 0.4 MILLIGRAM(S): 0.4 CAPSULE ORAL at 21:51

## 2018-08-07 RX ADMIN — Medication 300 MICROGRAM(S): at 11:23

## 2018-08-07 RX ADMIN — Medication 500000 UNIT(S): at 12:24

## 2018-08-07 RX ADMIN — TACROLIMUS 2.5 MILLIGRAM(S): 5 CAPSULE ORAL at 18:07

## 2018-08-07 RX ADMIN — Medication 500000 UNIT(S): at 18:06

## 2018-08-07 RX ADMIN — Medication 40 MILLIGRAM(S): at 18:07

## 2018-08-07 NOTE — PROGRESS NOTE ADULT - PROBLEM SELECTOR PLAN 1
7/17/18 HCV + DDRT with DGF on HD 3 times weekly at home  - 8/3 Repeat renal usg demonstrated stable donal-transplant hematoma  - Anemia. Patient was using heparin during each home dialysis session prior to this admission.  Stable H/H  -Last HD session 8/3/18.  No need for HD today  -BMP daily, Strict I/Os  -Diabetic, renal diet as tolerated  -SCDs, Ambulate as tolerated.

## 2018-08-07 NOTE — DIETITIAN INITIAL EVALUATION ADULT. - NS AS NUTRI INTERV ED CONTENT
Reviewed food safety guidelines for transplant recipients. Reviewed recommendations to avoid grapefruit, pomegranate and star fruit while taking immunosuppressant medication. Reviewed recommendations for moderate intake of sodium and carbohydrates with transplant medications. Pt with good teachback and declined need for repeat booklet. Also reviewed brief diet education regarding DM including importance of monitoring intake/portion sizes of CHOs, avoiding concentrated beverages/sweets, and effect of steroids on BG levels

## 2018-08-07 NOTE — PROGRESS NOTE ADULT - SUBJECTIVE AND OBJECTIVE BOX
Transplant Surgery - Multidisciplinary Rounds  --------------------------------------------------------------  DDRT 7/17/2018     Present:  Patient seen with multidisciplinary team (surgeon, Nephrologist, pharmacist, nurse, nurse manager, NP, resident MD, , MD student)  in am rounds and examined with Dr. Castaneda.      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  The patient received a kidney from a 40M donor KDPI 30%. CIT 22 hours.  Simulect induction. He was transferred to SICU postop for hypotension and was briefly on Raymond-Synephrine and levophed. A renal duplex demonstrated good flow and no KEREN.  His post-operative course was complicated by delayed graft function requiring TIW HD and PO Lasix 80mg BID.      No acute events overnight. No need for HD since 8/3/2018. Valcyte held due to leukopenia. Cellcept remains on hold. Velez removed with good spontaneous uop,.         MEDICATIONS  (STANDING):  dextrose 5%. 1000 milliLiter(s) (50 mL/Hr) IV Continuous <Continuous>  dextrose 50% Injectable 12.5 Gram(s) IV Push once  dextrose 50% Injectable 25 Gram(s) IV Push once  dextrose 50% Injectable 25 Gram(s) IV Push once  Epclusa 400-100mg 1 Tablet(s) 1 Tablet(s) Oral at bedtime  filgrastim-sndz Injectable 300 MICROGram(s) SubCutaneous once  gabapentin 300 milliGRAM(s) Oral daily  insulin lispro (HumaLOG) corrective regimen sliding scale   SubCutaneous three times a day before meals  insulin lispro (HumaLOG) corrective regimen sliding scale   SubCutaneous at bedtime  loperamide 2 milliGRAM(s) Oral daily  metolazone 5 milliGRAM(s) Oral daily  nystatin    Suspension 825857 Unit(s) Oral four times a day  predniSONE   Tablet 5 milliGRAM(s) Oral daily  tacrolimus 3 milliGRAM(s) Oral two times a day  tamsulosin 0.4 milliGRAM(s) Oral at bedtime  trimethoprim   80 mG/sulfamethoxazole 400 mG 1 Tablet(s) Oral daily    MEDICATIONS  (PRN):  acetaminophen   Tablet. 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      Vital Signs Last 24 Hrs  T(C): 37.2 (07 Aug 2018 10:14), Max: 37.5 (06 Aug 2018 21:41)  T(F): 98.9 (07 Aug 2018 10:14), Max: 99.5 (06 Aug 2018 21:41)  HR: 84 (07 Aug 2018 10:14) (74 - 90)  BP: 120/68 (07 Aug 2018 10:14) (119/69 - 134/77)  BP(mean): --  RR: 18 (07 Aug 2018 10:14) (18 - 18)  SpO2: 97% (07 Aug 2018 10:14) (97% - 99%)    I&O's Summary    06 Aug 2018 07:01  -  07 Aug 2018 07:00  --------------------------------------------------------  IN: 2040 mL / OUT: 1125 mL / NET: 915 mL    07 Aug 2018 07:01  -  07 Aug 2018 11:17  --------------------------------------------------------  IN: 0 mL / OUT: 150 mL / NET: -150 mL                              9.1    1.9   )-----------( 145      ( 07 Aug 2018 05:56 )             25.6     08-07    138  |  101  |  52<H>  ----------------------------<  170<H>  3.5   |  21<L>  |  9.78<H>    Ca    8.8      07 Aug 2018 05:56  Phos  4.3     08-07  Mg     1.6     08-07      Tacrolimus (), Serum: 9.7 ng/mL (08-07 @ 08:16)      Review of systems  Gen: No weight changes, fatigue, fevers/chills, weakness  Skin: No rashes  Head/Eyes/Ears/Mouth: No headache; Normal hearing; Normal vision w/o blurriness; No sinus pain/discomfort, sore throat  Respiratory: No dyspnea, cough, wheezing, hemoptysis  CV: No chest pain, PND, orthopnea  GI: No abdominal pain, diarrhea, constipation, nausea, vomiting, melena, hematochezia  : No increased frequency, dysuria, hematuria, nocturia  MSK: No joint pain/swelling; no back pain;  Neuro: No dizziness/lightheadedness, weakness, seizures, numbness, tingling  Heme: No easy bruising or bleeding  Endo: No heat/cold intolerance  Psych: No significant nervousness, anxiety, stress, depression  All other systems were reviewed and are negative, except as noted.    PHYSICAL EXAM:  Constitutional: Well developed / well nourished  Eyes: Anicteric, PERRLA  ENMT: nc/at  Neck: supple  Respiratory: CTA B/L  Cardiovascular: RRR  Gastrointestinal: Soft abdomen, ND, NT.  Genitourinary: Voiding spontaneously  Extremities: SCD's in place and working bilaterally. no edema  Vascular: Palpable dp pulses bilaterally  Neurological: A&O x3

## 2018-08-07 NOTE — PROGRESS NOTE ADULT - PROBLEM SELECTOR PLAN 2
-Continue nystatin, Bactrim, acrolimus  -Tacrolimus troughs daily, goal 8-10  -Continue Epclusa 2/2 HCV + donor.  - Valcyte stopped 8/6 due to leukopenia  - Given 300mcg Neupogen today  - Cellcept held since admission 8/1

## 2018-08-07 NOTE — DIETITIAN INITIAL EVALUATION ADULT. - NS AS NUTRI INTERV MEALS SNACK
1) Continue Consistent CHO+Snack, Renal Diet. Monitor electrolytes and renal indices prn. 2) Provide food preferences within dietary restrictions when feasible 3) Encourage good PO intake of meals

## 2018-08-07 NOTE — DIETITIAN INITIAL EVALUATION ADULT. - NS FNS REASON FOR WEIGHT CHANG
Pt reports UBW of 104Kg, suspects some weight loss 2/2 fluid shifts since previous admission. Per previous RD note (7/19/18), pt with lowest weight at that time of 109.6Kg. Noted current lowest weight 102.7Kg (8/3/18)- consistent with reported weight history/fluid loss

## 2018-08-07 NOTE — DIETITIAN INITIAL EVALUATION ADULT. - ORAL INTAKE PTA
Pt recent discharged from [facility], states he was home for ~4 days before this admission. Reports good appetite & PO intakes while home. Usual diet recall obtained, unremarkable/good

## 2018-08-07 NOTE — PROGRESS NOTE ADULT - PROBLEM SELECTOR PLAN 1
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  -Hb Stable.   -BMP daily  -Creat trending up, but with good UO, not fluid overloaded, not uremic, K normal, will hold HD today and monitor.   -continue immunosuppression.  -Hold Valcyte in setting of leukopenia.

## 2018-08-07 NOTE — DIETITIAN INITIAL EVALUATION ADULT. - ENERGY NEEDS
Ht 70 inches Wt 225.9 pounds BMI 32.4 Kg/m^2   pounds +/- 10%; 136% IBW  Edema: 1+ bilateral ankle edema Skin: no pressure injuries   Other pertinent information: 68 yo male with PMH of HTN, DM, ESRD S/P HCV+ DDRT on (7/17/18) with delayed graft function on home HD now admitted with drop in H/H. Found with evolving hematoma inferior to kidney transplant

## 2018-08-07 NOTE — PROGRESS NOTE ADULT - ATTENDING COMMENTS
Pt still with CHELA of transplant likely ATN from ischemia reperfusion injury +/- hematoma.    UO improving and pt euvolemic.  anemia stable.   Cont to hold dialysis for now.

## 2018-08-07 NOTE — DIETITIAN INITIAL EVALUATION ADULT. - ADHERENCE
Pt with PMH of DM, ESRD S/P HCV+ DDRT (7/17) with delayed graft function on home HD. States he was continuing to follow renal diet while home. States he did home HD 2x while at home. [facility] he continues to take Januvia 1x daily for BG management and was checking fingersticks 1-2x daily. Noted HgbA1c (7/17) 6.0%. Good teach back on food safety recommendations post-transplant noted/good

## 2018-08-07 NOTE — PROGRESS NOTE ADULT - ATTENDING COMMENTS
Decrease Tacrolimus dose to 2.5mg BID I personally saw and examined patient.  IMMUNOSUPPRESSION:  Decrease Tacrolimus dose to 2.5mg BID

## 2018-08-07 NOTE — PROGRESS NOTE ADULT - SUBJECTIVE AND OBJECTIVE BOX
[facility] DIVISION OF KIDNEY DISEASES AND HYPERTENSION -- FOLLOW UP NOTE  --------------------------------------------------------------------------------  Chief Complaint: renal transplant recipient     24 hour events/subjective:  No acute events overnight.   Been off of HD since 08/03.   Velez removed; voiding without issues.    PAST HISTORY  --------------------------------------------------------------------------------  No significant changes to PMH, PSH, FHx, SHx, unless otherwise noted    ALLERGIES & MEDICATIONS  --------------------------------------------------------------------------------  Allergies    No Known Allergies    Intolerances      Standing Inpatient Medications  dextrose 5%. 1000 milliLiter(s) IV Continuous <Continuous>  dextrose 50% Injectable 12.5 Gram(s) IV Push once  dextrose 50% Injectable 25 Gram(s) IV Push once  dextrose 50% Injectable 25 Gram(s) IV Push once  Epclusa 400-100mg 1 Tablet(s) 1 Tablet(s) Oral at bedtime  gabapentin 300 milliGRAM(s) Oral daily  insulin lispro (HumaLOG) corrective regimen sliding scale   SubCutaneous three times a day before meals  insulin lispro (HumaLOG) corrective regimen sliding scale   SubCutaneous at bedtime  loperamide 2 milliGRAM(s) Oral daily  metolazone 5 milliGRAM(s) Oral daily  nystatin    Suspension 516036 Unit(s) Oral four times a day  predniSONE   Tablet 5 milliGRAM(s) Oral daily  tacrolimus 3 milliGRAM(s) Oral two times a day  tamsulosin 0.4 milliGRAM(s) Oral at bedtime  trimethoprim   80 mG/sulfamethoxazole 400 mG 1 Tablet(s) Oral daily    PRN Inpatient Medications  acetaminophen   Tablet. 650 milliGRAM(s) Oral every 6 hours PRN  dextrose 40% Gel 15 Gram(s) Oral once PRN  glucagon  Injectable 1 milliGRAM(s) IntraMuscular once PRN      REVIEW OF SYSTEMS  --------------------------------------------------------------------------------  Gen: No weight changes, fatigue, fevers/chills, weakness  Skin: No rashes  Head/Eyes/Ears/Mouth: No headache; Normal hearing; Normal vision w/o blurriness  Respiratory: No dyspnea, cough, wheezing, hemoptysis  CV: No chest pain, PND, orthopnea  GI: No abdominal pain, diarrhea, constipation, nausea, vomiting, melena, hematochezia  : No increased frequency, dysuria, hematuria, nocturia  MSK: No joint pain/swelling; no back pain; no edema  Neuro: No dizziness/lightheadedness, weakness, seizures, numbness, tingling      VITALS/PHYSICAL EXAM  --------------------------------------------------------------------------------  T(C): 37.2 (08-07-18 @ 10:14), Max: 37.5 (08-06-18 @ 21:41)  HR: 84 (08-07-18 @ 10:14) (74 - 90)  BP: 120/68 (08-07-18 @ 10:14) (119/69 - 134/77)  RR: 18 (08-07-18 @ 10:14) (18 - 18)  SpO2: 97% (08-07-18 @ 10:14) (97% - 99%)  Wt(kg): --        08-06-18 @ 07:01  -  08-07-18 @ 07:00  --------------------------------------------------------  IN: 2040 mL / OUT: 1125 mL / NET: 915 mL    08-07-18 @ 07:01  -  08-07-18 @ 12:38  --------------------------------------------------------  IN: 300 mL / OUT: 250 mL / NET: 50 mL      Physical Exam:  	Gen: NAD  	Pulm: CTA B/L  	CV: RRR, S1S2; no rub  	Abd: +BS, soft, nontender/nondistended  	: No suprapubic tenderness  	UE: Warm, no edema; no asterixis  	LE: Warm, trace edema  	Psych: Normal affect and mood  	Skin: Warm, without rashes  	Vascular access: YOMAIRA TRUJILLO    LABS/STUDIES  --------------------------------------------------------------------------------              9.1    1.9   >-----------<  145      [08-07-18 @ 05:56]              25.6     138  |  101  |  52  ----------------------------<  170      [08-07-18 @ 05:56]  3.5   |  21  |  9.78        Ca     8.8     [08-07-18 @ 05:56]      Mg     1.6     [08-07-18 @ 05:56]      Phos  4.3     [08-07-18 @ 05:56]            Creatinine Trend:  SCr 9.78 [08-07 @ 05:56]  SCr 8.44 [08-06 @ 06:12]  SCr 7.66 [08-05 @ 06:18]  SCr 6.91 [08-04 @ 07:25]  SCr 6.32 [08-03 @ 06:03]        HbA1c 6.0      [07-17-18 @ 16:53]    HBsAb 5.6      [07-18-18 @ 19:20]  HBsAg Nonreact      [07-18-18 @ 19:20]  HBcAb Nonreact      [07-18-18 @ 19:20]  HCV 0.27, Nonreact      [07-18-18 @ 19:20]

## 2018-08-07 NOTE — DIETITIAN INITIAL EVALUATION ADULT. - OTHER INFO
Patient seen for length of stay on 6 Surjit. Reports good appetite & PO intakes during this admission. Observed with 100% PO intake of lunch. Per chart, pt required HD last on (8/3). Reports NKFA. Denied micronutrient supplementation PTA (stopped with supplements prior to transplant). Denies chewing/swallowing difficulty. Denies nausea/emesis. Last BM 8/7 per chart. Urine output x24 hrs: 1125ml (ranging from 50-175ml/hr)

## 2018-08-07 NOTE — PROGRESS NOTE ADULT - PROBLEM SELECTOR PLAN 2
Pt with stable hematoma on renal US, now s/p 3 U PRBC transfusion.  pt was using heparin in dialysate at home.  H/H now stable. Monitor H/H.

## 2018-08-08 LAB
ANION GAP SERPL CALC-SCNC: 15 MMOL/L — SIGNIFICANT CHANGE UP (ref 5–17)
BUN SERPL-MCNC: 65 MG/DL — HIGH (ref 7–23)
CALCIUM SERPL-MCNC: 8.9 MG/DL — SIGNIFICANT CHANGE UP (ref 8.4–10.5)
CHLORIDE SERPL-SCNC: 100 MMOL/L — SIGNIFICANT CHANGE UP (ref 96–108)
CO2 SERPL-SCNC: 20 MMOL/L — LOW (ref 22–31)
CREAT SERPL-MCNC: 10.92 MG/DL — HIGH (ref 0.5–1.3)
GLUCOSE BLDC GLUCOMTR-MCNC: 157 MG/DL — HIGH (ref 70–99)
GLUCOSE BLDC GLUCOMTR-MCNC: 179 MG/DL — HIGH (ref 70–99)
GLUCOSE BLDC GLUCOMTR-MCNC: 194 MG/DL — HIGH (ref 70–99)
GLUCOSE BLDC GLUCOMTR-MCNC: 222 MG/DL — HIGH (ref 70–99)
GLUCOSE SERPL-MCNC: 146 MG/DL — HIGH (ref 70–99)
HCT VFR BLD CALC: 29.8 % — LOW (ref 39–50)
HGB BLD-MCNC: 10 G/DL — LOW (ref 13–17)
MAGNESIUM SERPL-MCNC: 1.6 MG/DL — SIGNIFICANT CHANGE UP (ref 1.6–2.6)
MCHC RBC-ENTMCNC: 32.7 PG — SIGNIFICANT CHANGE UP (ref 27–34)
MCHC RBC-ENTMCNC: 33.5 GM/DL — SIGNIFICANT CHANGE UP (ref 32–36)
MCV RBC AUTO: 97.6 FL — SIGNIFICANT CHANGE UP (ref 80–100)
PHOSPHATE SERPL-MCNC: 4.8 MG/DL — HIGH (ref 2.5–4.5)
PLATELET # BLD AUTO: 182 K/UL — SIGNIFICANT CHANGE UP (ref 150–400)
POTASSIUM SERPL-MCNC: 4.2 MMOL/L — SIGNIFICANT CHANGE UP (ref 3.5–5.3)
POTASSIUM SERPL-SCNC: 4.2 MMOL/L — SIGNIFICANT CHANGE UP (ref 3.5–5.3)
RBC # BLD: 3.05 M/UL — LOW (ref 4.2–5.8)
RBC # FLD: 17.5 % — HIGH (ref 10.3–14.5)
SODIUM SERPL-SCNC: 135 MMOL/L — SIGNIFICANT CHANGE UP (ref 135–145)
TACROLIMUS SERPL-MCNC: 9 NG/ML — SIGNIFICANT CHANGE UP
WBC # BLD: 3.3 K/UL — LOW (ref 3.8–10.5)
WBC # FLD AUTO: 3.3 K/UL — LOW (ref 3.8–10.5)

## 2018-08-08 PROCEDURE — 99232 SBSQ HOSP IP/OBS MODERATE 35: CPT | Mod: GC

## 2018-08-08 RX ORDER — MYCOPHENOLATE MOFETIL 250 MG/1
500 CAPSULE ORAL
Qty: 0 | Refills: 0 | Status: DISCONTINUED | OUTPATIENT
Start: 2018-08-08 | End: 2018-08-08

## 2018-08-08 RX ADMIN — Medication 2 MILLIGRAM(S): at 11:32

## 2018-08-08 RX ADMIN — TACROLIMUS 2.5 MILLIGRAM(S): 5 CAPSULE ORAL at 17:02

## 2018-08-08 RX ADMIN — Medication 1 TABLET(S): at 11:32

## 2018-08-08 RX ADMIN — Medication 2: at 12:16

## 2018-08-08 RX ADMIN — Medication 40 MILLIGRAM(S): at 17:02

## 2018-08-08 RX ADMIN — TAMSULOSIN HYDROCHLORIDE 0.4 MILLIGRAM(S): 0.4 CAPSULE ORAL at 22:30

## 2018-08-08 RX ADMIN — Medication 500000 UNIT(S): at 06:04

## 2018-08-08 RX ADMIN — Medication 5 MILLIGRAM(S): at 06:05

## 2018-08-08 RX ADMIN — Medication 500000 UNIT(S): at 22:30

## 2018-08-08 RX ADMIN — TACROLIMUS 2.5 MILLIGRAM(S): 5 CAPSULE ORAL at 06:04

## 2018-08-08 RX ADMIN — Medication 40 MILLIGRAM(S): at 06:04

## 2018-08-08 RX ADMIN — Medication 1: at 08:31

## 2018-08-08 RX ADMIN — Medication 500000 UNIT(S): at 11:32

## 2018-08-08 RX ADMIN — Medication 1: at 17:02

## 2018-08-08 RX ADMIN — Medication 500000 UNIT(S): at 17:02

## 2018-08-08 RX ADMIN — GABAPENTIN 300 MILLIGRAM(S): 400 CAPSULE ORAL at 11:32

## 2018-08-08 NOTE — PROGRESS NOTE ADULT - PROBLEM SELECTOR PLAN 1
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  Stable H/H  -Last HD session 8/3/18.  Plan for HD today  -Send DSA today  -BMP daily, Strict I/Os  -Diabetic, renal diet as tolerated  -SCDs, Ambulate as tolerated. 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  Stable H/H  -Last HD session 8/3/18.  Plan for HD today  -Send DSA today  -BMP daily, Strict I/Os  -Diabetic, renal diet as tolerated  -SCDs, Ambulate as tolerated.  -Send stool for Alejandro, Cx

## 2018-08-08 NOTE — PROGRESS NOTE ADULT - PROBLEM SELECTOR PLAN 1
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  -Creatinine trending up, schedule for HD today.  -continue immunosuppression (Tacro 2.5 q12h) and pred 5  -Hold Valcyte in setting of leukopenia.  -s/p neupogen 300 mcg (08/08)

## 2018-08-08 NOTE — PROGRESS NOTE ADULT - PROBLEM SELECTOR PLAN 2
Pt with stable hematoma on renal US, now s/p 3 U PRBC transfusion.  pt was using heparin in dialysate at home.   avoid heparin.  H/H now stable. Monitor H/H.

## 2018-08-08 NOTE — PROGRESS NOTE ADULT - SUBJECTIVE AND OBJECTIVE BOX
[facility] DIVISION OF KIDNEY DISEASES AND HYPERTENSION -- FOLLOW UP NOTE  --------------------------------------------------------------------------------  Chief Complaint: renal transplant    24 hour events/subjective:  s/p neupogen 300 mcg x1  no acute complaint.        PAST HISTORY  --------------------------------------------------------------------------------  No significant changes to PMH, PSH, FHx, SHx, unless otherwise noted    ALLERGIES & MEDICATIONS  --------------------------------------------------------------------------------  Allergies    No Known Allergies    Intolerances      Standing Inpatient Medications  dextrose 5%. 1000 milliLiter(s) IV Continuous <Continuous>  dextrose 50% Injectable 12.5 Gram(s) IV Push once  dextrose 50% Injectable 25 Gram(s) IV Push once  dextrose 50% Injectable 25 Gram(s) IV Push once  Epclusa 400-100mg 1 Tablet(s) 1 Tablet(s) Oral at bedtime  furosemide    Tablet 40 milliGRAM(s) Oral two times a day  gabapentin 300 milliGRAM(s) Oral daily  insulin lispro (HumaLOG) corrective regimen sliding scale   SubCutaneous three times a day before meals  insulin lispro (HumaLOG) corrective regimen sliding scale   SubCutaneous at bedtime  loperamide 2 milliGRAM(s) Oral daily  metolazone 5 milliGRAM(s) Oral daily  nystatin    Suspension 999735 Unit(s) Oral four times a day  predniSONE   Tablet 5 milliGRAM(s) Oral daily  tacrolimus 2.5 milliGRAM(s) Oral two times a day  tamsulosin 0.4 milliGRAM(s) Oral at bedtime  trimethoprim   80 mG/sulfamethoxazole 400 mG 1 Tablet(s) Oral daily    PRN Inpatient Medications  acetaminophen   Tablet. 650 milliGRAM(s) Oral every 6 hours PRN  dextrose 40% Gel 15 Gram(s) Oral once PRN  glucagon  Injectable 1 milliGRAM(s) IntraMuscular once PRN      REVIEW OF SYSTEMS  --------------------------------------------------------------------------------  Gen: No weight changes, fatigue, fevers/chills, weakness  Skin: No rashes  Head/Eyes/Ears/Mouth: No headache; Normal hearing; Normal vision w/o blurriness; No sinus pain/discomfort, sore throat  Respiratory: No dyspnea, cough, wheezing, hemoptysis  CV: No chest pain, PND, orthopnea  GI: No abdominal pain, diarrhea, constipation, nausea, vomiting, melena, hematochezia  : No increased frequency, dysuria, hematuria, nocturia  MSK: No joint pain/swelling; no back pain; no edema  Neuro: No dizziness/lightheadedness, weakness, seizures, numbness, tingling  Heme: No easy bruising or bleeding  Endo: No heat/cold intolerance  Psych: No significant nervousness, anxiety, stress, depression    All other systems were reviewed and are negative, except as noted.    VITALS/PHYSICAL EXAM  --------------------------------------------------------------------------------  T(C): 36.9 (08-08-18 @ 09:49), Max: 37.1 (08-07-18 @ 13:27)  HR: 91 (08-08-18 @ 09:49) (79 - 91)  BP: 112/63 (08-08-18 @ 09:49) (96/61 - 152/86)  RR: 20 (08-08-18 @ 09:49) (16 - 20)  SpO2: 96% (08-08-18 @ 09:49) (94% - 99%)  Wt(kg): --        08-07-18 @ 07:01  -  08-08-18 @ 07:00  --------------------------------------------------------  IN: 1320 mL / OUT: 1100 mL / NET: 220 mL    08-08-18 @ 07:01  -  08-08-18 @ 12:03  --------------------------------------------------------  IN: 150 mL / OUT: 150 mL / NET: 0 mL      Physical Exam:  	Gen: NAD  	Pulm: CTA B/L  	CV: RRR, S1S2; no rub  	Abd: +BS, soft, nontender/nondistended  	: No suprapubic tenderness  	UE: Warm, no edema  	LE: Warm, trace edema  	Skin: Warm, without rashes  	Vascular access: YOMAIRA TRUJILLO    LABS/STUDIES  --------------------------------------------------------------------------------              10.0   3.3   >-----------<  182      [08-08-18 @ 06:26]              29.8     135  |  100  |  65  ----------------------------<  146      [08-08-18 @ 06:26]  4.2   |  20  |  10.92        Ca     8.9     [08-08-18 @ 06:26]      Mg     1.6     [08-08-18 @ 06:26]      Phos  4.8     [08-08-18 @ 06:26]            Creatinine Trend:  SCr 10.92 [08-08 @ 06:26]  SCr 9.78 [08-07 @ 05:56]  SCr 8.44 [08-06 @ 06:12]  SCr 7.66 [08-05 @ 06:18]  SCr 6.91 [08-04 @ 07:25]        HbA1c 6.0      [07-17-18 @ 16:53]    HBsAb 5.6      [07-18-18 @ 19:20]  HBsAg Nonreact      [07-18-18 @ 19:20]  HBcAb Nonreact      [07-18-18 @ 19:20]  HCV 0.27, Nonreact      [07-18-18 @ 19:20]

## 2018-08-08 NOTE — PROGRESS NOTE ADULT - SUBJECTIVE AND OBJECTIVE BOX
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 Castaneda.   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  DONOR 40M KDPI 30%. CIT 22 hours.  Simulect induction. His post-operative course was complicated by delayed graft function requiring TIW HD and PO Lasix 80mg BID.      No acute events overnight. Valcyte held due to leukopenia. Cellcept remains on hold. Started on Lasix 40mg BID PO yesterday. Neupogen 300mcg x1.       MEDICATIONS  (STANDING):  dextrose 5%. 1000 milliLiter(s) (50 mL/Hr) IV Continuous <Continuous>  dextrose 50% Injectable 12.5 Gram(s) IV Push once  dextrose 50% Injectable 25 Gram(s) IV Push once  dextrose 50% Injectable 25 Gram(s) IV Push once  Epclusa 400-100mg 1 Tablet(s) 1 Tablet(s) Oral at bedtime  furosemide    Tablet 40 milliGRAM(s) Oral two times a day  gabapentin 300 milliGRAM(s) Oral daily  insulin lispro (HumaLOG) corrective regimen sliding scale   SubCutaneous three times a day before meals  insulin lispro (HumaLOG) corrective regimen sliding scale   SubCutaneous at bedtime  loperamide 2 milliGRAM(s) Oral daily  metolazone 5 milliGRAM(s) Oral daily  nystatin    Suspension 690713 Unit(s) Oral four times a day  predniSONE   Tablet 5 milliGRAM(s) Oral daily  tacrolimus 2.5 milliGRAM(s) Oral two times a day  tamsulosin 0.4 milliGRAM(s) Oral at bedtime  trimethoprim   80 mG/sulfamethoxazole 400 mG 1 Tablet(s) Oral daily    MEDICATIONS  (PRN):  acetaminophen   Tablet. 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      Vital Signs Last 24 Hrs  T(C): 36.9 (08 Aug 2018 09:49), Max: 37.1 (07 Aug 2018 13:27)  T(F): 98.5 (08 Aug 2018 09:49), Max: 98.8 (07 Aug 2018 13:27)  HR: 91 (08 Aug 2018 09:49) (79 - 91)  BP: 112/63 (08 Aug 2018 09:49) (96/61 - 152/86)  BP(mean): --  RR: 20 (08 Aug 2018 09:49) (16 - 20)  SpO2: 96% (08 Aug 2018 09:49) (94% - 99%)    I&O's Summary    07 Aug 2018 07:01  -  08 Aug 2018 07:00  --------------------------------------------------------  IN: 1320 mL / OUT: 1100 mL / NET: 220 mL    08 Aug 2018 07:01  -  08 Aug 2018 12:24  --------------------------------------------------------  IN: 150 mL / OUT: 180 mL / NET: -30 mL                              10.0   3.3   )-----------( 182      ( 08 Aug 2018 06:26 )             29.8     08-08    135  |  100  |  65<H>  ----------------------------<  146<H>  4.2   |  20<L>  |  10.92<H>    Ca    8.9      08 Aug 2018 06:26  Phos  4.8     08-08  Mg     1.6     08-08      Tacrolimus (), Serum: 9.0 ng/mL (08-08 @ 08:03)    Review of systems  Gen: No weight changes, fatigue, fevers/chills, weakness  Skin: No rashes  Head/Eyes/Ears/Mouth: No headache; Normal hearing; Normal vision w/o blurriness; No sinus pain/discomfort, sore throat  Respiratory: No dyspnea, cough, wheezing, hemoptysis  CV: No chest pain, PND, orthopnea  GI: No abdominal pain, diarrhea, constipation, nausea, vomiting, melena, hematochezia  : No increased frequency, dysuria, hematuria, nocturia  MSK: No joint pain/swelling; no back pain;  Neuro: No dizziness/lightheadedness, weakness, seizures, numbness, tingling  Heme: No easy bruising or bleeding  Endo: No heat/cold intolerance  Psych: No significant nervousness, anxiety, stress, depression  All other systems were reviewed and are negative, except as noted.    PHYSICAL EXAM:  Constitutional: Well developed / well nourished  Eyes: Anicteric, PERRLA  ENMT: nc/at  Neck: supple  Respiratory: CTA B/L  Cardiovascular: RRR  Gastrointestinal: Soft abdomen, ND, NT.  Genitourinary: Voiding spontaneously  Extremities: SCD's in place and working bilaterally. 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 Castaneda.   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  DONOR 40M KDPI 30%. CIT 22 hours.  Simulect induction. His post-operative course was complicated by delayed graft function requiring TIW HD and PO Lasix 80mg BID.      No acute events overnight. Valcyte held due to leukopenia. Cellcept remains on hold. Started on Lasix 40mg BID PO yesterday. Neupogen 300mcg x1. C/O watery stools      MEDICATIONS  (STANDING):  dextrose 5%. 1000 milliLiter(s) (50 mL/Hr) IV Continuous <Continuous>  dextrose 50% Injectable 12.5 Gram(s) IV Push once  dextrose 50% Injectable 25 Gram(s) IV Push once  dextrose 50% Injectable 25 Gram(s) IV Push once  Epclusa 400-100mg 1 Tablet(s) 1 Tablet(s) Oral at bedtime  furosemide    Tablet 40 milliGRAM(s) Oral two times a day  gabapentin 300 milliGRAM(s) Oral daily  insulin lispro (HumaLOG) corrective regimen sliding scale   SubCutaneous three times a day before meals  insulin lispro (HumaLOG) corrective regimen sliding scale   SubCutaneous at bedtime  loperamide 2 milliGRAM(s) Oral daily  metolazone 5 milliGRAM(s) Oral daily  nystatin    Suspension 531913 Unit(s) Oral four times a day  predniSONE   Tablet 5 milliGRAM(s) Oral daily  tacrolimus 2.5 milliGRAM(s) Oral two times a day  tamsulosin 0.4 milliGRAM(s) Oral at bedtime  trimethoprim   80 mG/sulfamethoxazole 400 mG 1 Tablet(s) Oral daily    MEDICATIONS  (PRN):  acetaminophen   Tablet. 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      Vital Signs Last 24 Hrs  T(C): 36.9 (08 Aug 2018 09:49), Max: 37.1 (07 Aug 2018 13:27)  T(F): 98.5 (08 Aug 2018 09:49), Max: 98.8 (07 Aug 2018 13:27)  HR: 91 (08 Aug 2018 09:49) (79 - 91)  BP: 112/63 (08 Aug 2018 09:49) (96/61 - 152/86)  BP(mean): --  RR: 20 (08 Aug 2018 09:49) (16 - 20)  SpO2: 96% (08 Aug 2018 09:49) (94% - 99%)    I&O's Summary    07 Aug 2018 07:01  -  08 Aug 2018 07:00  --------------------------------------------------------  IN: 1320 mL / OUT: 1100 mL / NET: 220 mL    08 Aug 2018 07:01  -  08 Aug 2018 12:24  --------------------------------------------------------  IN: 150 mL / OUT: 180 mL / NET: -30 mL                              10.0   3.3   )-----------( 182      ( 08 Aug 2018 06:26 )             29.8     08-08    135  |  100  |  65<H>  ----------------------------<  146<H>  4.2   |  20<L>  |  10.92<H>    Ca    8.9      08 Aug 2018 06:26  Phos  4.8     08-08  Mg     1.6     08-08      Tacrolimus (), Serum: 9.0 ng/mL (08-08 @ 08:03)    Review of systems  Gen: No weight changes, fatigue, fevers/chills, weakness  Skin: No rashes  Head/Eyes/Ears/Mouth: No headache; Normal hearing; Normal vision w/o blurriness; No sinus pain/discomfort, sore throat  Respiratory: No dyspnea, cough, wheezing, hemoptysis  CV: No chest pain, PND, orthopnea  GI: No abdominal pain, diarrhea, constipation, nausea, vomiting, melena, hematochezia  : No increased frequency, dysuria, hematuria, nocturia  MSK: No joint pain/swelling; no back pain;  Neuro: No dizziness/lightheadedness, weakness, seizures, numbness, tingling  Heme: No easy bruising or bleeding  Endo: No heat/cold intolerance  Psych: No significant nervousness, anxiety, stress, depression  All other systems were reviewed and are negative, except as noted.    PHYSICAL EXAM:  Constitutional: Well developed / well nourished  Eyes: Anicteric, PERRLA  ENMT: nc/at  Neck: supple  Respiratory: CTA B/L  Cardiovascular: RRR  Gastrointestinal: Soft abdomen, ND, NT.  Genitourinary: Voiding spontaneously  Extremities: SCD's in place and working bilaterally. no edema  Vascular: Palpable dp pulses bilaterally  Neurological: A&O x3

## 2018-08-08 NOTE — PROGRESS NOTE ADULT - PROBLEM SELECTOR PLAN 2
-Continue nystatin, Bactrim, Tacrolimus  -Tacrolimus troughs daily, goal 8-10  -Continue Epclusa 2/2 HCV + donor.  - Valcyte stopped 8/6 due to leukopenia  - Given 300mcg Neupogen today  - Cellcept held since admission 8/1. -Continue nystatin, Bactrim, Tacrolimus  -Tacrolimus troughs daily, goal 8-10  -Continue Epclusa 2/2 HCV + donor.  - Valcyte stopped 8/6 due to leukopenia  - Cellcept held since admission 8/1.

## 2018-08-08 NOTE — PROGRESS NOTE ADULT - ATTENDING COMMENTS
Pt still with CHELA of transplant likely ATN from ischemia reperfusion injury +/- hematoma.    UO improving and pt euvolemic but creatinine increasing  anemia stable.   will perform dialysis today in case pt requires procedure such as biopsy.

## 2018-08-09 ENCOUNTER — RESULT REVIEW (OUTPATIENT)
Age: 68
End: 2018-08-09

## 2018-08-09 ENCOUNTER — APPOINTMENT (OUTPATIENT)
Dept: TRANSPLANT | Facility: CLINIC | Age: 68
End: 2018-08-09

## 2018-08-09 DIAGNOSIS — R19.7 DIARRHEA, UNSPECIFIED: ICD-10-CM

## 2018-08-09 LAB
ANION GAP SERPL CALC-SCNC: 16 MMOL/L — SIGNIFICANT CHANGE UP (ref 5–17)
APTT BLD: 32.2 SEC — SIGNIFICANT CHANGE UP (ref 27.5–37.4)
BUN SERPL-MCNC: 50 MG/DL — HIGH (ref 7–23)
C DIFF GDH STL QL: NEGATIVE — SIGNIFICANT CHANGE UP
C DIFF GDH STL QL: SIGNIFICANT CHANGE UP
CALCIUM SERPL-MCNC: 8.8 MG/DL — SIGNIFICANT CHANGE UP (ref 8.4–10.5)
CHLORIDE SERPL-SCNC: 100 MMOL/L — SIGNIFICANT CHANGE UP (ref 96–108)
CO2 SERPL-SCNC: 22 MMOL/L — SIGNIFICANT CHANGE UP (ref 22–31)
CREAT SERPL-MCNC: 9.69 MG/DL — HIGH (ref 0.5–1.3)
GLUCOSE BLDC GLUCOMTR-MCNC: 123 MG/DL — HIGH (ref 70–99)
GLUCOSE BLDC GLUCOMTR-MCNC: 150 MG/DL — HIGH (ref 70–99)
GLUCOSE BLDC GLUCOMTR-MCNC: 171 MG/DL — HIGH (ref 70–99)
GLUCOSE SERPL-MCNC: 133 MG/DL — HIGH (ref 70–99)
HCT VFR BLD CALC: 25.7 % — LOW (ref 39–50)
HCT VFR BLD CALC: 26.2 % — LOW (ref 39–50)
HGB BLD-MCNC: 8.6 G/DL — LOW (ref 13–17)
HGB BLD-MCNC: 8.8 G/DL — LOW (ref 13–17)
INR BLD: 1.06 RATIO — SIGNIFICANT CHANGE UP (ref 0.88–1.16)
MAGNESIUM SERPL-MCNC: 1.6 MG/DL — SIGNIFICANT CHANGE UP (ref 1.6–2.6)
MCHC RBC-ENTMCNC: 31.9 PG — SIGNIFICANT CHANGE UP (ref 27–34)
MCHC RBC-ENTMCNC: 32.8 GM/DL — SIGNIFICANT CHANGE UP (ref 32–36)
MCHC RBC-ENTMCNC: 32.9 PG — SIGNIFICANT CHANGE UP (ref 27–34)
MCHC RBC-ENTMCNC: 34.2 GM/DL — SIGNIFICANT CHANGE UP (ref 32–36)
MCV RBC AUTO: 96.1 FL — SIGNIFICANT CHANGE UP (ref 80–100)
MCV RBC AUTO: 97.1 FL — SIGNIFICANT CHANGE UP (ref 80–100)
PHOSPHATE SERPL-MCNC: 4 MG/DL — SIGNIFICANT CHANGE UP (ref 2.5–4.5)
PLATELET # BLD AUTO: 184 K/UL — SIGNIFICANT CHANGE UP (ref 150–400)
PLATELET # BLD AUTO: 186 K/UL — SIGNIFICANT CHANGE UP (ref 150–400)
POTASSIUM SERPL-MCNC: 3.6 MMOL/L — SIGNIFICANT CHANGE UP (ref 3.5–5.3)
POTASSIUM SERPL-SCNC: 3.6 MMOL/L — SIGNIFICANT CHANGE UP (ref 3.5–5.3)
PROTHROM AB SERPL-ACNC: 11.5 SEC — SIGNIFICANT CHANGE UP (ref 9.8–12.7)
RBC # BLD: 2.68 M/UL — LOW (ref 4.2–5.8)
RBC # BLD: 2.7 M/UL — LOW (ref 4.2–5.8)
RBC # FLD: 17 % — HIGH (ref 10.3–14.5)
RBC # FLD: 17.6 % — HIGH (ref 10.3–14.5)
SODIUM SERPL-SCNC: 138 MMOL/L — SIGNIFICANT CHANGE UP (ref 135–145)
TACROLIMUS SERPL-MCNC: 5.5 NG/ML — SIGNIFICANT CHANGE UP
WBC # BLD: 3.2 K/UL — LOW (ref 3.8–10.5)
WBC # BLD: 3.7 K/UL — LOW (ref 3.8–10.5)
WBC # FLD AUTO: 3.2 K/UL — LOW (ref 3.8–10.5)
WBC # FLD AUTO: 3.7 K/UL — LOW (ref 3.8–10.5)

## 2018-08-09 PROCEDURE — 88312 SPECIAL STAINS GROUP 1: CPT | Mod: 26

## 2018-08-09 PROCEDURE — 99232 SBSQ HOSP IP/OBS MODERATE 35: CPT | Mod: GC

## 2018-08-09 PROCEDURE — 76942 ECHO GUIDE FOR BIOPSY: CPT | Mod: 26

## 2018-08-09 PROCEDURE — 88313 SPECIAL STAINS GROUP 2: CPT | Mod: 26

## 2018-08-09 PROCEDURE — 88350 IMFLUOR EA ADDL 1ANTB STN PX: CPT | Mod: 26

## 2018-08-09 PROCEDURE — 50200 RENAL BIOPSY PERQ: CPT | Mod: RT

## 2018-08-09 PROCEDURE — 88305 TISSUE EXAM BY PATHOLOGIST: CPT | Mod: 26

## 2018-08-09 PROCEDURE — 88346 IMFLUOR 1ST 1ANTB STAIN PX: CPT | Mod: 26

## 2018-08-09 PROCEDURE — 88342 IMHCHEM/IMCYTCHM 1ST ANTB: CPT | Mod: 26

## 2018-08-09 RX ORDER — TACROLIMUS 5 MG/1
3 CAPSULE ORAL
Qty: 0 | Refills: 0 | Status: DISCONTINUED | OUTPATIENT
Start: 2018-08-09 | End: 2018-08-11

## 2018-08-09 RX ORDER — MAGNESIUM SULFATE 500 MG/ML
2 VIAL (ML) INJECTION ONCE
Qty: 0 | Refills: 0 | Status: COMPLETED | OUTPATIENT
Start: 2018-08-09 | End: 2018-08-09

## 2018-08-09 RX ORDER — MORPHINE SULFATE 50 MG/1
2 CAPSULE, EXTENDED RELEASE ORAL EVERY 4 HOURS
Qty: 0 | Refills: 0 | Status: DISCONTINUED | OUTPATIENT
Start: 2018-08-09 | End: 2018-08-15

## 2018-08-09 RX ORDER — DESMOPRESSIN ACETATE 0.1 MG/1
30 TABLET ORAL ONCE
Qty: 0 | Refills: 0 | Status: COMPLETED | OUTPATIENT
Start: 2018-08-09 | End: 2018-08-09

## 2018-08-09 RX ADMIN — TACROLIMUS 3 MILLIGRAM(S): 5 CAPSULE ORAL at 18:47

## 2018-08-09 RX ADMIN — Medication 1 TABLET(S): at 13:17

## 2018-08-09 RX ADMIN — DESMOPRESSIN ACETATE 172.5 MICROGRAM(S): 0.1 TABLET ORAL at 16:02

## 2018-08-09 RX ADMIN — Medication 5 MILLIGRAM(S): at 06:01

## 2018-08-09 RX ADMIN — Medication 500000 UNIT(S): at 06:01

## 2018-08-09 RX ADMIN — TAMSULOSIN HYDROCHLORIDE 0.4 MILLIGRAM(S): 0.4 CAPSULE ORAL at 22:38

## 2018-08-09 RX ADMIN — Medication 1: at 08:19

## 2018-08-09 RX ADMIN — TACROLIMUS 2.5 MILLIGRAM(S): 5 CAPSULE ORAL at 06:01

## 2018-08-09 RX ADMIN — Medication 500000 UNIT(S): at 13:17

## 2018-08-09 RX ADMIN — Medication 40 MILLIGRAM(S): at 06:01

## 2018-08-09 RX ADMIN — GABAPENTIN 300 MILLIGRAM(S): 400 CAPSULE ORAL at 13:17

## 2018-08-09 RX ADMIN — Medication 500000 UNIT(S): at 23:27

## 2018-08-09 RX ADMIN — Medication 50 GRAM(S): at 09:33

## 2018-08-09 RX ADMIN — Medication 40 MILLIGRAM(S): at 18:46

## 2018-08-09 NOTE — PROGRESS NOTE ADULT - SUBJECTIVE AND OBJECTIVE BOX
VIR Procedure Note    Pre-Procedure Diagnosis: CHELA, RLQ transplant kidney  Post-Procedure Diagnosis: Same as pre.    Procedure Details: US guided RLQ transplant kidney, medial pole. Pathology tech confirmed sample adequacy. Gelfoam embolization of the tract.     Complications: None  Blood loss: 5cc  Specimen: 4 cores x 1cm  Contrast: none  Sedation: Anesthesia    Plan:   -4 hour recovery in PACU, 1st hour is strict prone position.  -If any hemodynamic instability, consider renal bleed, immediately notify IR ([phone] after hours).     Dimitri Johnson MD  VIR

## 2018-08-09 NOTE — PROGRESS NOTE ADULT - SUBJECTIVE AND OBJECTIVE BOX
[facility] DIVISION OF KIDNEY DISEASES AND HYPERTENSION -- FOLLOW UP NOTE  --------------------------------------------------------------------------------  Chief Complaint: DDRT    24 hour events/subjective:  pt c/o diarrhea- 5 episodes in 24 hours.  s/p HD with 0.5 L UF      PAST HISTORY  --------------------------------------------------------------------------------  No significant changes to PMH, PSH, FHx, SHx, unless otherwise noted    ALLERGIES & MEDICATIONS  --------------------------------------------------------------------------------  Allergies    No Known Allergies    Intolerances      Standing Inpatient Medications  desmopressin IVPB 30 MICROGram(s) IV Intermittent once  dextrose 5%. 1000 milliLiter(s) IV Continuous <Continuous>  dextrose 50% Injectable 12.5 Gram(s) IV Push once  dextrose 50% Injectable 25 Gram(s) IV Push once  dextrose 50% Injectable 25 Gram(s) IV Push once  Epclusa 400-100mg 1 Tablet(s) 1 Tablet(s) Oral at bedtime  furosemide    Tablet 40 milliGRAM(s) Oral two times a day  gabapentin 300 milliGRAM(s) Oral daily  insulin lispro (HumaLOG) corrective regimen sliding scale   SubCutaneous three times a day before meals  insulin lispro (HumaLOG) corrective regimen sliding scale   SubCutaneous at bedtime  metolazone 5 milliGRAM(s) Oral daily  nystatin    Suspension 174505 Unit(s) Oral four times a day  predniSONE   Tablet 5 milliGRAM(s) Oral daily  tacrolimus 2.5 milliGRAM(s) Oral two times a day  tamsulosin 0.4 milliGRAM(s) Oral at bedtime  trimethoprim   80 mG/sulfamethoxazole 400 mG 1 Tablet(s) Oral daily    PRN Inpatient Medications  acetaminophen   Tablet. 650 milliGRAM(s) Oral every 6 hours PRN  dextrose 40% Gel 15 Gram(s) Oral once PRN  glucagon  Injectable 1 milliGRAM(s) IntraMuscular once PRN      REVIEW OF SYSTEMS  --------------------------------------------------------------------------------  Gen: No weight changes, fatigue, fevers/chills, weakness  Skin: No rashes  Head/Eyes/Ears/Mouth: No headache; Normal hearing; Normal vision w/o blurriness; No sinus pain/discomfort, sore throat  Respiratory: No dyspnea, cough, wheezing, hemoptysis  CV: No chest pain, PND, orthopnea  GI:  diarrhea+  : No increased frequency, dysuria, hematuria, nocturia  MSK: No joint pain/swelling; no back pain; no edema  Neuro: No dizziness/lightheadedness, weakness, seizures, numbness, tingling    VITALS/PHYSICAL EXAM  --------------------------------------------------------------------------------  T(C): 37.2 (08-09-18 @ 05:41), Max: 37.6 (08-08-18 @ 21:28)  HR: 84 (08-09-18 @ 05:41) (84 - 99)  BP: 108/67 (08-09-18 @ 05:41) (108/67 - 153/82)  RR: 18 (08-09-18 @ 05:41) (18 - 20)  SpO2: 96% (08-09-18 @ 05:41) (96% - 99%)  Wt(kg): --        08-08-18 @ 07:01  -  08-09-18 @ 07:00  --------------------------------------------------------  IN: 1190 mL / OUT: 2130 mL / NET: -940 mL    08-09-18 @ 07:01  -  08-09-18 @ 09:46  --------------------------------------------------------  IN: 0 mL / OUT: 125 mL / NET: -125 mL      Physical Exam:  	            Gen: NAD  	Pulm: CTA B/L  	CV: RRR, S1S2; no rub  	Abd: +BS, soft, nontender/nondistended  	: No suprapubic tenderness  	UE: Warm, no edema  	LE: Warm, trace edema  	Skin: Warm, without rashes  	Vascular access: YOMAIRA TRUJILLO    LABS/STUDIES  --------------------------------------------------------------------------------              8.6    3.2   >-----------<  186      [08-09-18 @ 07:11]              26.2     138  |  100  |  50  ----------------------------<  133      [08-09-18 @ 07:07]  3.6   |  22  |  9.69        Ca     8.8     [08-09-18 @ 07:07]      Mg     1.6     [08-09-18 @ 07:07]      Phos  4.0     [08-09-18 @ 07:07]            Creatinine Trend:  SCr 9.69 [08-09 @ 07:07]  SCr 10.92 [08-08 @ 06:26]  SCr 9.78 [08-07 @ 05:56]  SCr 8.44 [08-06 @ 06:12]  SCr 7.66 [08-05 @ 06:18]        HbA1c 6.0      [07-17-18 @ 16:53]    HBsAb 5.6      [07-18-18 @ 19:20]  HBsAg Nonreact      [07-18-18 @ 19:20]  HBcAb Nonreact      [07-18-18 @ 19:20]  HCV 0.27, Nonreact      [07-18-18 @ 19:20]

## 2018-08-09 NOTE — PROGRESS NOTE ADULT - ATTENDING COMMENTS
Increase Tacrolimus dose to 3mg I personally saw and examined patient.  IMMUNOSUPPRESSION:  Increase Tacrolimus dose to 3mg

## 2018-08-09 NOTE — PROGRESS NOTE ADULT - PROBLEM SELECTOR PLAN 2
-Continue nystatin, Bactrim, Tacrolimus  -Tacrolimus troughs daily, goal 8-10  -Continue Epclusa 2/2 HCV + donor.  - Valcyte stopped 8/6 due to leukopenia  - Cellcept held since admission 8/1.

## 2018-08-09 NOTE — PROGRESS NOTE ADULT - SUBJECTIVE AND OBJECTIVE BOX
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 Castaneda.   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  DONOR 40M KDPI 30%. CIT 22 hours.  Simulect induction. His post-operative course was complicated by delayed graft function requiring TIW HD and PO Lasix 80mg BID.      No acute events overnight.  HD yesterday. DSA sent. Valcyte still on hold due to leukopenia. Cellcept remains on hold.  c/O frequent watery stools      Potential Discharge date 8/13    Education: plan of care    Plan of care: see assessment and plan of care    MEDICATIONS  (STANDING):  desmopressin IVPB 30 MICROGram(s) IV Intermittent once  dextrose 5%. 1000 milliLiter(s) (50 mL/Hr) IV Continuous <Continuous>  dextrose 50% Injectable 12.5 Gram(s) IV Push once  dextrose 50% Injectable 25 Gram(s) IV Push once  dextrose 50% Injectable 25 Gram(s) IV Push once  Epclusa 400-100mg 1 Tablet(s) 1 Tablet(s) Oral at bedtime  furosemide    Tablet 40 milliGRAM(s) Oral two times a day  gabapentin 300 milliGRAM(s) Oral daily  insulin lispro (HumaLOG) corrective regimen sliding scale   SubCutaneous three times a day before meals  insulin lispro (HumaLOG) corrective regimen sliding scale   SubCutaneous at bedtime  metolazone 5 milliGRAM(s) Oral daily  nystatin    Suspension 118135 Unit(s) Oral four times a day  predniSONE   Tablet 5 milliGRAM(s) Oral daily  tacrolimus 2.5 milliGRAM(s) Oral two times a day  tamsulosin 0.4 milliGRAM(s) Oral at bedtime  trimethoprim   80 mG/sulfamethoxazole 400 mG 1 Tablet(s) Oral daily    MEDICATIONS  (PRN):  acetaminophen   Tablet. 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      Vital Signs Last 24 Hrs  T(C): 37.2 (09 Aug 2018 05:41), Max: 37.6 (08 Aug 2018 21:28)  T(F): 98.9 (09 Aug 2018 05:41), Max: 99.6 (08 Aug 2018 21:28)  HR: 84 (09 Aug 2018 05:41) (84 - 99)  BP: 108/67 (09 Aug 2018 05:41) (108/67 - 153/82)  BP(mean): --  RR: 18 (09 Aug 2018 05:41) (18 - 18)  SpO2: 96% (09 Aug 2018 05:41) (96% - 99%)    I&O's Summary    08 Aug 2018 07:01  -  09 Aug 2018 07:00  --------------------------------------------------------  IN: 1190 mL / OUT: 2130 mL / NET: -940 mL    09 Aug 2018 07:01  -  09 Aug 2018 12:42  --------------------------------------------------------  IN: 0 mL / OUT: 275 mL / NET: -275 mL                              8.8    3.7   )-----------( 184      ( 09 Aug 2018 09:34 )             25.7     08-09    138  |  100  |  50<H>  ----------------------------<  133<H>  3.6   |  22  |  9.69<H>    Ca    8.8      09 Aug 2018 07:07  Phos  4.0     08-09  Mg     1.6     08-09      Tacrolimus (), Serum: 5.5 ng/mL (08-09 @ 08:32)    Review of systems  Gen: No weight changes, fatigue, fevers/chills, weakness  Skin: No rashes  Head/Eyes/Ears/Mouth: No headache; Normal hearing; Normal vision w/o blurriness; No sinus pain/discomfort, sore throat  Respiratory: No dyspnea, cough, wheezing, hemoptysis  CV: No chest pain, PND, orthopnea  GI: No abdominal pain, constipation, nausea, vomiting, melena, hematochezia. reports frequent shahid stools  : No increased frequency, dysuria, hematuria, nocturia  MSK: No joint pain/swelling; no back pain;  Neuro: No dizziness/lightheadedness, weakness, seizures, numbness, tingling  Heme: No easy bruising or bleeding  Endo: No heat/cold intolerance  Psych: No significant nervousness, anxiety, stress, depression  All other systems were reviewed and are negative, except as noted.    PHYSICAL EXAM:  Constitutional: Well developed / well nourished  Eyes: Anicteric, PERRLA  ENMT: nc/at  Neck: supple  Respiratory: CTA B/L  Cardiovascular: RRR  Gastrointestinal: Soft abdomen, ND, NT.  Genitourinary: Voiding spontaneously  Extremities: SCD's in place and working bilaterally. trace BLE edema  Vascular: Palpable dp pulses bilaterally  Neurological: A&O x3

## 2018-08-09 NOTE — PROGRESS NOTE ADULT - PROBLEM SELECTOR PLAN 1
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  -s/p HD yesterday. Plan for renal biopsy today  -Give 20 mcg DDAVP 30 mins prior to procedure.  -continue immunosuppression (Tacro 2.5 q12h) and pred 5  -Hold Valcyte in setting of leukopenia.  -s/p neupogen 300 mcg (08/08)

## 2018-08-09 NOTE — PROGRESS NOTE ADULT - ATTENDING COMMENTS
Pt still with CHELA of transplant likely ATN from ischemia reperfusion injury +/- hematoma.    UO worse today s/p dialysis yesterday  anemia stable.   biopsy today

## 2018-08-09 NOTE — PROGRESS NOTE ADULT - PROBLEM SELECTOR PLAN 1
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  Stable H/H  -NPO for ultrasound guided biopsy in IR today   -FU PT/INR, repeat CBC  Last HD session 8/8/18.  No HD today  -FU DSA today  -BMP daily, Strict I/Os  -Diabetic, renal diet as tolerated  -SCDs, Ambulate as tolerated.  -Send stool for Corina Allen.

## 2018-08-10 LAB
ANION GAP SERPL CALC-SCNC: 17 MMOL/L — SIGNIFICANT CHANGE UP (ref 5–17)
BUN SERPL-MCNC: 60 MG/DL — HIGH (ref 7–23)
CALCIUM SERPL-MCNC: 9.1 MG/DL — SIGNIFICANT CHANGE UP (ref 8.4–10.5)
CHLORIDE SERPL-SCNC: 98 MMOL/L — SIGNIFICANT CHANGE UP (ref 96–108)
CO2 SERPL-SCNC: 23 MMOL/L — SIGNIFICANT CHANGE UP (ref 22–31)
CREAT SERPL-MCNC: 11.84 MG/DL — HIGH (ref 0.5–1.3)
CULTURE RESULTS: SIGNIFICANT CHANGE UP
GLUCOSE BLDC GLUCOMTR-MCNC: 149 MG/DL — HIGH (ref 70–99)
GLUCOSE BLDC GLUCOMTR-MCNC: 158 MG/DL — HIGH (ref 70–99)
GLUCOSE BLDC GLUCOMTR-MCNC: 171 MG/DL — HIGH (ref 70–99)
GLUCOSE BLDC GLUCOMTR-MCNC: 220 MG/DL — HIGH (ref 70–99)
GLUCOSE BLDC GLUCOMTR-MCNC: 257 MG/DL — HIGH (ref 70–99)
GLUCOSE SERPL-MCNC: 163 MG/DL — HIGH (ref 70–99)
HCT VFR BLD CALC: 28.9 % — LOW (ref 39–50)
HGB BLD-MCNC: 8.9 G/DL — LOW (ref 13–17)
MAGNESIUM SERPL-MCNC: 2 MG/DL — SIGNIFICANT CHANGE UP (ref 1.6–2.6)
MCHC RBC-ENTMCNC: 30.1 PG — SIGNIFICANT CHANGE UP (ref 27–34)
MCHC RBC-ENTMCNC: 30.9 GM/DL — LOW (ref 32–36)
MCV RBC AUTO: 97.5 FL — SIGNIFICANT CHANGE UP (ref 80–100)
PHOSPHATE SERPL-MCNC: 5.2 MG/DL — HIGH (ref 2.5–4.5)
PLATELET # BLD AUTO: 222 K/UL — SIGNIFICANT CHANGE UP (ref 150–400)
POTASSIUM SERPL-MCNC: 3.9 MMOL/L — SIGNIFICANT CHANGE UP (ref 3.5–5.3)
POTASSIUM SERPL-SCNC: 3.9 MMOL/L — SIGNIFICANT CHANGE UP (ref 3.5–5.3)
RBC # BLD: 2.97 M/UL — LOW (ref 4.2–5.8)
RBC # FLD: 17.4 % — HIGH (ref 10.3–14.5)
SODIUM SERPL-SCNC: 138 MMOL/L — SIGNIFICANT CHANGE UP (ref 135–145)
SPECIMEN SOURCE: SIGNIFICANT CHANGE UP
TACROLIMUS SERPL-MCNC: 7.4 NG/ML — SIGNIFICANT CHANGE UP
WBC # BLD: 3.4 K/UL — LOW (ref 3.8–10.5)
WBC # FLD AUTO: 3.4 K/UL — LOW (ref 3.8–10.5)

## 2018-08-10 PROCEDURE — 99231 SBSQ HOSP IP/OBS SF/LOW 25: CPT

## 2018-08-10 PROCEDURE — 99233 SBSQ HOSP IP/OBS HIGH 50: CPT | Mod: GC,24

## 2018-08-10 PROCEDURE — 99232 SBSQ HOSP IP/OBS MODERATE 35: CPT | Mod: GC

## 2018-08-10 RX ORDER — DIPHENOXYLATE HCL/ATROPINE 2.5-.025MG
1 TABLET ORAL
Qty: 0 | Refills: 0 | Status: DISCONTINUED | OUTPATIENT
Start: 2018-08-10 | End: 2018-08-12

## 2018-08-10 RX ORDER — SODIUM CHLORIDE 9 MG/ML
1000 INJECTION INTRAMUSCULAR; INTRAVENOUS; SUBCUTANEOUS
Qty: 0 | Refills: 0 | Status: DISCONTINUED | OUTPATIENT
Start: 2018-08-10 | End: 2018-08-11

## 2018-08-10 RX ORDER — MYCOPHENOLATE MOFETIL 250 MG/1
500 CAPSULE ORAL
Qty: 0 | Refills: 0 | Status: DISCONTINUED | OUTPATIENT
Start: 2018-08-10 | End: 2018-08-13

## 2018-08-10 RX ADMIN — Medication 1 TABLET(S): at 17:28

## 2018-08-10 RX ADMIN — Medication 500000 UNIT(S): at 12:24

## 2018-08-10 RX ADMIN — SODIUM CHLORIDE 75 MILLILITER(S): 9 INJECTION INTRAMUSCULAR; INTRAVENOUS; SUBCUTANEOUS at 10:29

## 2018-08-10 RX ADMIN — Medication 5 MILLIGRAM(S): at 06:06

## 2018-08-10 RX ADMIN — Medication 3: at 12:27

## 2018-08-10 RX ADMIN — Medication 650 MILLIGRAM(S): at 18:12

## 2018-08-10 RX ADMIN — TACROLIMUS 3 MILLIGRAM(S): 5 CAPSULE ORAL at 17:28

## 2018-08-10 RX ADMIN — MYCOPHENOLATE MOFETIL 500 MILLIGRAM(S): 250 CAPSULE ORAL at 17:38

## 2018-08-10 RX ADMIN — Medication 1 TABLET(S): at 17:31

## 2018-08-10 RX ADMIN — Medication 500000 UNIT(S): at 06:06

## 2018-08-10 RX ADMIN — Medication 650 MILLIGRAM(S): at 17:40

## 2018-08-10 RX ADMIN — TAMSULOSIN HYDROCHLORIDE 0.4 MILLIGRAM(S): 0.4 CAPSULE ORAL at 21:17

## 2018-08-10 RX ADMIN — Medication 40 MILLIGRAM(S): at 06:05

## 2018-08-10 RX ADMIN — TACROLIMUS 3 MILLIGRAM(S): 5 CAPSULE ORAL at 06:09

## 2018-08-10 RX ADMIN — Medication 1: at 17:30

## 2018-08-10 RX ADMIN — Medication 2: at 09:46

## 2018-08-10 RX ADMIN — Medication 500000 UNIT(S): at 17:28

## 2018-08-10 RX ADMIN — GABAPENTIN 300 MILLIGRAM(S): 400 CAPSULE ORAL at 12:24

## 2018-08-10 RX ADMIN — Medication 1 TABLET(S): at 12:26

## 2018-08-10 NOTE — PROGRESS NOTE ADULT - SUBJECTIVE AND OBJECTIVE BOX
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 Castaneda.   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  DONOR 40M KDPI 30%. CIT 22 hours.  Simulect induction. His post-operative course was complicated by delayed graft function requiring TIW HD and PO Lasix 80mg BID.      Had renal biopsy yesterday (4 cores x 1 cm)    Potential Discharge date 8/13      MEDICATIONS  (STANDING):  desmopressin IVPB 30 MICROGram(s) IV Intermittent once  dextrose 5%. 1000 milliLiter(s) (50 mL/Hr) IV Continuous <Continuous>  dextrose 50% Injectable 12.5 Gram(s) IV Push once  dextrose 50% Injectable 25 Gram(s) IV Push once  dextrose 50% Injectable 25 Gram(s) IV Push once  Epclusa 400-100mg 1 Tablet(s) 1 Tablet(s) Oral at bedtime  furosemide    Tablet 40 milliGRAM(s) Oral two times a day  gabapentin 300 milliGRAM(s) Oral daily  insulin lispro (HumaLOG) corrective regimen sliding scale   SubCutaneous three times a day before meals  insulin lispro (HumaLOG) corrective regimen sliding scale   SubCutaneous at bedtime  metolazone 5 milliGRAM(s) Oral daily  nystatin    Suspension 285459 Unit(s) Oral four times a day  predniSONE   Tablet 5 milliGRAM(s) Oral daily  tacrolimus 2.5 milliGRAM(s) Oral two times a day  tamsulosin 0.4 milliGRAM(s) Oral at bedtime  trimethoprim   80 mG/sulfamethoxazole 400 mG 1 Tablet(s) Oral daily    MEDICATIONS  (PRN):  acetaminophen   Tablet. 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    Vital Signs Last 24 Hrs  T(C): 37 (10 Aug 2018 09:32), Max: 37.2 (09 Aug 2018 23:30)  T(F): 98.6 (10 Aug 2018 09:32), Max: 98.9 (09 Aug 2018 23:30)  HR: 81 (10 Aug 2018 09:32) (81 - 103)  BP: 133/74 (10 Aug 2018 09:32) (108/50 - 151/84)  BP(mean): 85 (09 Aug 2018 22:30) (73 - 94)  RR: 18 (10 Aug 2018 09:32) (16 - 18)  SpO2: 96% (10 Aug 2018 09:32) (96% - 100%)    I&O's Detail    09 Aug 2018 07:01  -  10 Aug 2018 07:00  --------------------------------------------------------  IN:    Oral Fluid: 100 mL  Total IN: 100 mL    OUT:    Voided: 625 mL  Total OUT: 625 mL    Total NET: -525 mL                            8.9    3.4   )-----------( 222      ( 10 Aug 2018 06:57 )             28.9     08-10    138  |  98  |  60<H>  ----------------------------<  163<H>  3.9   |  23  |  11.84<H>    Ca    9.1      10 Aug 2018 06:55  Phos  5.2     08-10  Mg     2.0     08-10      Review of systems  Gen: No weight changes, fatigue, fevers/chills, weakness  Skin: No rashes  Head/Eyes/Ears/Mouth: No headache; Normal hearing; Normal vision w/o blurriness; No sinus pain/discomfort, sore throat  Respiratory: No dyspnea, cough, wheezing, hemoptysis  CV: No chest pain, PND, orthopnea  GI: No abdominal pain, constipation, nausea, vomiting, melena, hematochezia. reports frequent shahid stools  : No increased frequency, dysuria, hematuria, nocturia  MSK: No joint pain/swelling; no back pain;  Neuro: No dizziness/lightheadedness, weakness, seizures, numbness, tingling  Heme: No easy bruising or bleeding  Endo: No heat/cold intolerance  Psych: No significant nervousness, anxiety, stress, depression  All other systems were reviewed and are negative, except as noted.    PHYSICAL EXAM:  Constitutional: Well developed / well nourished  Eyes: Anicteric, PERRLA  ENMT: nc/at  Neck: supple  Respiratory: CTA B/L  Cardiovascular: RRR  Gastrointestinal: Soft abdomen, ND, NT.  Genitourinary: Voiding spontaneously  Extremities: SCD's in place and working bilaterally. 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 Castaneda.   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  DONOR 40M KDPI 30%. CIT 22 hours.  Simulect induction. His post-operative course was complicated by delayed graft function requiring TIW HD and PO Lasix 80mg BID.      Had renal biopsy yesterday (4 cores x 1 cm). Patient examined at bedside. GAIL, complaining of diarrhea for the last month, C. diff yesterday was negative, pending GI PCR results.    Potential Discharge date 8/13      MEDICATIONS  (STANDING):  dextrose 5%. 1000 milliLiter(s) (50 mL/Hr) IV Continuous <Continuous>  dextrose 50% Injectable 12.5 Gram(s) IV Push once  dextrose 50% Injectable 25 Gram(s) IV Push once  dextrose 50% Injectable 25 Gram(s) IV Push once  diphenoxylate/atropine 1 Tablet(s) Oral four times a day  Epclusa 400-100mg 1 Tablet(s) 1 Tablet(s) Oral at bedtime  gabapentin 300 milliGRAM(s) Oral daily  insulin lispro (HumaLOG) corrective regimen sliding scale   SubCutaneous three times a day before meals  insulin lispro (HumaLOG) corrective regimen sliding scale   SubCutaneous at bedtime  mycophenolate mofetil 500 milliGRAM(s) Oral two times a day  nystatin    Suspension 477797 Unit(s) Oral four times a day  predniSONE   Tablet 5 milliGRAM(s) Oral daily  sodium chloride 0.9%. 1000 milliLiter(s) (75 mL/Hr) IV Continuous <Continuous>  tacrolimus 3 milliGRAM(s) Oral two times a day  tamsulosin 0.4 milliGRAM(s) Oral at bedtime  trimethoprim   80 mG/sulfamethoxazole 400 mG 1 Tablet(s) Oral daily    MEDICATIONS  (PRN):  acetaminophen   Tablet. 650 milliGRAM(s) Oral every 6 hours PRN Mild Pain (1 - 3)  dextrose 40% Gel 15 Gram(s) Oral once PRN Blood Glucose LESS THAN 70 milliGRAM(s)/deciliter  glucagon  Injectable 1 milliGRAM(s) IntraMuscular once PRN Glucose LESS THAN 70 milligrams/deciliter  morphine  - Injectable 2 milliGRAM(s) IV Push every 4 hours PRN Moderate Pain (4 - 6)        PAST MEDICAL & SURGICAL HISTORY:  ESRD (end stage renal disease) on dialysis  Kidney cancer, primary, with metastasis from kidney to other site: with no mets  HTN (hypertension)  DM (diabetes mellitus)  S/p nephrectomy: right 12/10/2015  S/p nephrectomy: left 9/15/2015  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    Vital Signs Last 24 Hrs  T(C): 37 (10 Aug 2018 09:32), Max: 37.2 (09 Aug 2018 23:30)  T(F): 98.6 (10 Aug 2018 09:32), Max: 98.9 (09 Aug 2018 23:30)  HR: 81 (10 Aug 2018 09:32) (81 - 103)  BP: 133/74 (10 Aug 2018 09:32) (108/50 - 151/84)  BP(mean): 85 (09 Aug 2018 22:30) (73 - 94)  RR: 18 (10 Aug 2018 09:32) (16 - 18)  SpO2: 96% (10 Aug 2018 09:32) (96% - 100%)    I&O's Detail    09 Aug 2018 07:01  -  10 Aug 2018 07:00  --------------------------------------------------------  IN:    Oral Fluid: 100 mL  Total IN: 100 mL    OUT:    Voided: 625 mL  Total OUT: 625 mL    Total NET: -525 mL                            8.9    3.4   )-----------( 222      ( 10 Aug 2018 06:57 )             28.9     08-10    138  |  98  |  60<H>  ----------------------------<  163<H>  3.9   |  23  |  11.84<H>    Ca    9.1      10 Aug 2018 06:55  Phos  5.2     08-10  Mg     2.0     08-10        Review of systems  Gen: No weight changes, fatigue, fevers/chills, weakness  Skin: No rashes  Head/Eyes/Ears/Mouth: No headache; Normal hearing; Normal vision w/o blurriness; No sinus pain/discomfort, sore throat  Respiratory: No dyspnea, cough, wheezing, hemoptysis  CV: No chest pain, PND, orthopnea  GI: No abdominal pain, constipation, nausea, vomiting, melena, hematochezia. reports frequent shahid stools  : No increased frequency, dysuria, hematuria, nocturia  MSK: No joint pain/swelling; no back pain;  Neuro: No dizziness/lightheadedness, weakness, seizures, numbness, tingling  Heme: No easy bruising or bleeding  Endo: No heat/cold intolerance  Psych: No significant nervousness, anxiety, stress, depression  All other systems were reviewed and are negative, except as noted.    PHYSICAL EXAM:  Constitutional: Well developed / well nourished  Eyes: Anicteric, PERRLA  ENMT: nc/at  Neck: supple  Respiratory: CTA B/L  Cardiovascular: RRR  Gastrointestinal: Soft abdomen, ND, NT.  Genitourinary: Voiding spontaneously  Extremities: SCD's in place and working bilaterally. trace BLE edema  Vascular: Palpable dp pulses bilaterally  Neurological: A&O x3

## 2018-08-10 NOTE — PROGRESS NOTE ADULT - PROBLEM SELECTOR PLAN 3
stool cultures pending.  stool for C-diff negative stool cultures pending.  stool for C-diff negative.  off Cellcept since admission.  Start diphenoxylate/atropine.

## 2018-08-10 NOTE — PROGRESS NOTE ADULT - PROBLEM SELECTOR PLAN 1
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  Stable H/H  -FU PT/INR, repeat CBC  - Last HD session 8/8/18.   - F/U DSA  - BMP daily, Strict I/Os  - Diabetic, renal diet as tolerated  - SCDs, Ambulate as tolerated.  - C. Diff negative  - Kidney biopsy yesterday

## 2018-08-10 NOTE — PROGRESS NOTE ADULT - PROBLEM SELECTOR PLAN 1
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  -s/p renal biopsy; results pending.  -continue immunosuppression (Tacro 2.5 q12h) and pred 5  - Valcyte held in setting of leukopenia.  -s/p neupogen 300 mcg (08/08) 7/17/18 HCV + DDRT with DGF on HD 3 times weekly at home.  -s/p renal biopsy; results pending.  -continue immunosuppression (Tacro 3mg q12h) and pred 5  - Valcyte held in setting of leukopenia.  -s/p neupogen 300 mcg (08/08)  -s/p renal biopsy (08/09), results pending.  -Last HD on 08/08. Pt now with decreasing UOP, likely dehydrated.  -d/c Lasix and metolazone.  -Start IV fluids NS @ 75 cc/hr for 12 hours.

## 2018-08-10 NOTE — PROGRESS NOTE ADULT - SUBJECTIVE AND OBJECTIVE BOX
Interventional Radiology Follow- Up Note      67y Male s/p right renal transplant biopsy  on 8/9   in Interventional Radiology with Dr. Fatimah Grover. Denies abdominal pain.   No complaints offered.    Vitals: T(F): 98.6 (08-10-18 @ 09:32), Max: 98.9 (08-09-18 @ 23:30)  HR: 81 (08-10-18 @ 09:32) (81 - 103)  BP: 133/74 (08-10-18 @ 09:32) (108/50 - 151/84)  RR: 18 (08-10-18 @ 09:32) (16 - 18)  SpO2: 96% (08-10-18 @ 09:32) (96% - 100%)  Wt(kg): --    LABS:                        8.9    3.4   )-----------( 222      ( 10 Aug 2018 06:57 )             28.9     08-10    138  |  98  |  60<H>  ----------------------------<  163<H>  3.9   |  23  |  11.84<H>    Ca    9.1      10 Aug 2018 06:55  Phos  5.2     08-10  Mg     2.0     08-10      PT/INR - ( 09 Aug 2018 09:34 )   PT: 11.5 sec;   INR: 1.06 ratio         PTT - ( 09 Aug 2018 09:34 )  PTT:32.2 sec    PHYSICAL EXAM:  General: Nontoxic, in NAD  Neuro:  Alert & oriented x 3  Abdomen: soft, NTND. Dressing at the biopsy site changed. No hematoma/ bleeding.      Impression: 67y Male s/p RLQ renal biopsy    Plan:  - Follow up biopsy results  - care per primary team   -will discuss with IR attending     Please call IR at extension [phone] with any questions, concerns, or issues regarding above.

## 2018-08-10 NOTE — PROGRESS NOTE ADULT - ATTENDING COMMENTS
admitted with bleed post-txp    bx shows ATN preliminarily.     marginal graft function.     Long standing diarrhea -- starting lomotil    Immuno: tac/pred/cellcept 500mg BID

## 2018-08-10 NOTE — PROGRESS NOTE ADULT - ATTENDING COMMENTS
Pt still with CHELA of transplant likely ATN from ischemia reperfusion injury +/- hematoma.    Biopsy prelim suggestive of ATN not rejection.   hold off on dialysis.

## 2018-08-10 NOTE — PROGRESS NOTE ADULT - SUBJECTIVE AND OBJECTIVE BOX
[facility] DIVISION OF KIDNEY DISEASES AND HYPERTENSION -- FOLLOW UP NOTE  --------------------------------------------------------------------------------  Chief Complaint:    24 hour events/subjective:        PAST HISTORY  --------------------------------------------------------------------------------  No significant changes to PMH, PSH, FHx, SHx, unless otherwise noted    ALLERGIES & MEDICATIONS  --------------------------------------------------------------------------------  Allergies    No Known Allergies    Intolerances      Standing Inpatient Medications  dextrose 5%. 1000 milliLiter(s) IV Continuous <Continuous>  dextrose 50% Injectable 12.5 Gram(s) IV Push once  dextrose 50% Injectable 25 Gram(s) IV Push once  dextrose 50% Injectable 25 Gram(s) IV Push once  diphenoxylate/atropine 1 Tablet(s) Oral four times a day  Epclusa 400-100mg 1 Tablet(s) 1 Tablet(s) Oral at bedtime  gabapentin 300 milliGRAM(s) Oral daily  insulin lispro (HumaLOG) corrective regimen sliding scale   SubCutaneous three times a day before meals  insulin lispro (HumaLOG) corrective regimen sliding scale   SubCutaneous at bedtime  mycophenolate mofetil 500 milliGRAM(s) Oral two times a day  nystatin    Suspension 461912 Unit(s) Oral four times a day  predniSONE   Tablet 5 milliGRAM(s) Oral daily  tacrolimus 3 milliGRAM(s) Oral two times a day  tamsulosin 0.4 milliGRAM(s) Oral at bedtime  trimethoprim   80 mG/sulfamethoxazole 400 mG 1 Tablet(s) Oral daily    PRN Inpatient Medications  acetaminophen   Tablet. 650 milliGRAM(s) Oral every 6 hours PRN  dextrose 40% Gel 15 Gram(s) Oral once PRN  glucagon  Injectable 1 milliGRAM(s) IntraMuscular once PRN  morphine  - Injectable 2 milliGRAM(s) IV Push every 4 hours PRN      REVIEW OF SYSTEMS  --------------------------------------------------------------------------------  Gen: No weight changes, fatigue, fevers/chills, weakness  Skin: No rashes  Head/Eyes/Ears/Mouth: No headache; Normal hearing; Normal vision w/o blurriness; No sinus pain/discomfort, sore throat  Respiratory: No dyspnea, cough, wheezing, hemoptysis  CV: No chest pain, PND, orthopnea  GI: No abdominal pain, diarrhea, constipation, nausea, vomiting, melena, hematochezia  : No increased frequency, dysuria, hematuria, nocturia  MSK: No joint pain/swelling; no back pain; no edema  Neuro: No dizziness/lightheadedness, weakness, seizures, numbness, tingling  Heme: No easy bruising or bleeding  Endo: No heat/cold intolerance  Psych: No significant nervousness, anxiety, stress, depression    All other systems were reviewed and are negative, except as noted.    VITALS/PHYSICAL EXAM  --------------------------------------------------------------------------------  T(C): 37 (08-10-18 @ 09:32), Max: 37.2 (08-09-18 @ 23:30)  HR: 81 (08-10-18 @ 09:32) (81 - 103)  BP: 133/74 (08-10-18 @ 09:32) (108/50 - 151/84)  RR: 18 (08-10-18 @ 09:32) (16 - 18)  SpO2: 96% (08-10-18 @ 09:32) (96% - 100%)  Wt(kg): --        08-09-18 @ 07:01  -  08-10-18 @ 07:00  --------------------------------------------------------  IN: 100 mL / OUT: 625 mL / NET: -525 mL      Physical Exam:  	Gen: NAD, well-appearing  	HEENT: PERRL, supple neck, clear oropharynx  	Pulm: CTA B/L  	CV: RRR, S1S2; no rub  	Back: No spinal or CVA tenderness; no sacral edema  	Abd: +BS, soft, nontender/nondistended  	: No suprapubic tenderness  	UE: Warm, FROM, no clubbing, intact strength; no edema; no asterixis  	LE: Warm, FROM, no clubbing, intact strength; no edema  	Neuro: No focal deficits, intact gait  	Psych: Normal affect and mood  	Skin: Warm, without rashes  	Vascular access:    LABS/STUDIES  --------------------------------------------------------------------------------              8.9    3.4   >-----------<  222      [08-10-18 @ 06:57]              28.9     138  |  98  |  60  ----------------------------<  163      [08-10-18 @ 06:55]  3.9   |  23  |  11.84        Ca     9.1     [08-10-18 @ 06:55]      Mg     2.0     [08-10-18 @ 06:55]      Phos  5.2     [08-10-18 @ 06:55]      PT/INR: PT 11.5 , INR 1.06       [08-09-18 @ 09:34]  PTT: 32.2       [08-09-18 @ 09:34]      Creatinine Trend:  SCr 11.84 [08-10 @ 06:55]  SCr 9.69 [08-09 @ 07:07]  SCr 10.92 [08-08 @ 06:26]  SCr 9.78 [08-07 @ 05:56]  SCr 8.44 [08-06 @ 06:12]        HbA1c 6.0      [07-17-18 @ 16:53]    HBsAb 5.6      [07-18-18 @ 19:20]  HBsAg Nonreact      [07-18-18 @ 19:20]  HBcAb Nonreact      [07-18-18 @ 19:20]  HCV 0.27, Nonreact      [07-18-18 @ 19:20] [facility] DIVISION OF KIDNEY DISEASES AND HYPERTENSION -- FOLLOW UP NOTE  --------------------------------------------------------------------------------  Chief Complaint: DDRT    24 hour events/subjective:  s/p renal biopsy yesterday.  Pt continues to have loose stools, reports having diarrhea even before transplant.      PAST HISTORY  --------------------------------------------------------------------------------  No significant changes to PMH, PSH, FHx, SHx, unless otherwise noted    ALLERGIES & MEDICATIONS  --------------------------------------------------------------------------------  Allergies    No Known Allergies    Intolerances      Standing Inpatient Medications  dextrose 5%. 1000 milliLiter(s) IV Continuous <Continuous>  dextrose 50% Injectable 12.5 Gram(s) IV Push once  dextrose 50% Injectable 25 Gram(s) IV Push once  dextrose 50% Injectable 25 Gram(s) IV Push once  diphenoxylate/atropine 1 Tablet(s) Oral four times a day  Epclusa 400-100mg 1 Tablet(s) 1 Tablet(s) Oral at bedtime  gabapentin 300 milliGRAM(s) Oral daily  insulin lispro (HumaLOG) corrective regimen sliding scale   SubCutaneous three times a day before meals  insulin lispro (HumaLOG) corrective regimen sliding scale   SubCutaneous at bedtime  mycophenolate mofetil 500 milliGRAM(s) Oral two times a day  nystatin    Suspension 258210 Unit(s) Oral four times a day  predniSONE   Tablet 5 milliGRAM(s) Oral daily  tacrolimus 3 milliGRAM(s) Oral two times a day  tamsulosin 0.4 milliGRAM(s) Oral at bedtime  trimethoprim   80 mG/sulfamethoxazole 400 mG 1 Tablet(s) Oral daily    PRN Inpatient Medications  acetaminophen   Tablet. 650 milliGRAM(s) Oral every 6 hours PRN  dextrose 40% Gel 15 Gram(s) Oral once PRN  glucagon  Injectable 1 milliGRAM(s) IntraMuscular once PRN  morphine  - Injectable 2 milliGRAM(s) IV Push every 4 hours PRN      REVIEW OF SYSTEMS  --------------------------------------------------------------------------------  Gen: No weight changes, fatigue, fevers/chills, weakness  Skin: No rashes  Head/Eyes/Ears/Mouth: No headache; Normal hearing; Normal vision w/o blurriness; No sinus pain/discomfort, sore throat  Respiratory: No dyspnea, cough, wheezing, hemoptysis  CV: No chest pain, PND, orthopnea  GI: No abdominal pain, diarrhea, constipation, nausea, vomiting, melena, hematochezia  : No increased frequency, dysuria, hematuria, nocturia  MSK: No joint pain/swelling; no back pain; no edema  Neuro: No dizziness/lightheadedness, weakness, seizures, numbness, tingling  Heme: No easy bruising or bleeding  Endo: No heat/cold intolerance  Psych: No significant nervousness, anxiety, stress, depression    All other systems were reviewed and are negative, except as noted.    VITALS/PHYSICAL EXAM  --------------------------------------------------------------------------------  T(C): 37 (08-10-18 @ 09:32), Max: 37.2 (08-09-18 @ 23:30)  HR: 81 (08-10-18 @ 09:32) (81 - 103)  BP: 133/74 (08-10-18 @ 09:32) (108/50 - 151/84)  RR: 18 (08-10-18 @ 09:32) (16 - 18)  SpO2: 96% (08-10-18 @ 09:32) (96% - 100%)  Wt(kg): --        08-09-18 @ 07:01  -  08-10-18 @ 07:00  --------------------------------------------------------  IN: 100 mL / OUT: 625 mL / NET: -525 mL      Physical Exam:  	Gen: NAD, well-appearing  	HEENT: PERRL, supple neck, clear oropharynx  	Pulm: CTA B/L  	CV: RRR, S1S2; no rub  	Back: No spinal or CVA tenderness; no sacral edema  	Abd: +BS, soft, nontender/nondistended  	: No suprapubic tenderness  	UE: Warm, FROM, no clubbing, intact strength; no edema; no asterixis  	LE: Warm, FROM, no clubbing, intact strength; no edema  	Neuro: No focal deficits, intact gait  	Psych: Normal affect and mood  	Skin: Warm, without rashes  	Vascular access:    LABS/STUDIES  --------------------------------------------------------------------------------              8.9    3.4   >-----------<  222      [08-10-18 @ 06:57]              28.9     138  |  98  |  60  ----------------------------<  163      [08-10-18 @ 06:55]  3.9   |  23  |  11.84        Ca     9.1     [08-10-18 @ 06:55]      Mg     2.0     [08-10-18 @ 06:55]      Phos  5.2     [08-10-18 @ 06:55]      PT/INR: PT 11.5 , INR 1.06       [08-09-18 @ 09:34]  PTT: 32.2       [08-09-18 @ 09:34]      Creatinine Trend:  SCr 11.84 [08-10 @ 06:55]  SCr 9.69 [08-09 @ 07:07]  SCr 10.92 [08-08 @ 06:26]  SCr 9.78 [08-07 @ 05:56]  SCr 8.44 [08-06 @ 06:12]        HbA1c 6.0      [07-17-18 @ 16:53]    HBsAb 5.6      [07-18-18 @ 19:20]  HBsAg Nonreact      [07-18-18 @ 19:20]  HBcAb Nonreact      [07-18-18 @ 19:20]  HCV 0.27, Nonreact      [07-18-18 @ 19:20] [facility] DIVISION OF KIDNEY DISEASES AND HYPERTENSION -- FOLLOW UP NOTE  --------------------------------------------------------------------------------  Chief Complaint: DDRT    24 hour events/subjective:  s/p renal biopsy yesterday.  Pt continues to have loose stools, reports having diarrhea even before transplant.      PAST HISTORY  --------------------------------------------------------------------------------  No significant changes to PMH, PSH, FHx, SHx, unless otherwise noted    ALLERGIES & MEDICATIONS  --------------------------------------------------------------------------------  Allergies    No Known Allergies    Intolerances      Standing Inpatient Medications  dextrose 5%. 1000 milliLiter(s) IV Continuous <Continuous>  dextrose 50% Injectable 12.5 Gram(s) IV Push once  dextrose 50% Injectable 25 Gram(s) IV Push once  dextrose 50% Injectable 25 Gram(s) IV Push once  diphenoxylate/atropine 1 Tablet(s) Oral four times a day  Epclusa 400-100mg 1 Tablet(s) 1 Tablet(s) Oral at bedtime  gabapentin 300 milliGRAM(s) Oral daily  insulin lispro (HumaLOG) corrective regimen sliding scale   SubCutaneous three times a day before meals  insulin lispro (HumaLOG) corrective regimen sliding scale   SubCutaneous at bedtime  mycophenolate mofetil 500 milliGRAM(s) Oral two times a day  nystatin    Suspension 815897 Unit(s) Oral four times a day  predniSONE   Tablet 5 milliGRAM(s) Oral daily  tacrolimus 3 milliGRAM(s) Oral two times a day  tamsulosin 0.4 milliGRAM(s) Oral at bedtime  trimethoprim   80 mG/sulfamethoxazole 400 mG 1 Tablet(s) Oral daily    PRN Inpatient Medications  acetaminophen   Tablet. 650 milliGRAM(s) Oral every 6 hours PRN  dextrose 40% Gel 15 Gram(s) Oral once PRN  glucagon  Injectable 1 milliGRAM(s) IntraMuscular once PRN  morphine  - Injectable 2 milliGRAM(s) IV Push every 4 hours PRN      REVIEW OF SYSTEMS  --------------------------------------------------------------------------------  Gen: No weight changes, fatigue, fevers/chills, weakness  Skin: No rashes  Head/Eyes/Ears/Mouth: No headache; Normal hearing; Normal vision w/o blurriness; No sinus pain/discomfort, sore throat  Respiratory: No dyspnea, cough, wheezing, hemoptysis  CV: No chest pain, PND, orthopnea  GI: No abdominal pain, , constipation, nausea, vomiting, melena, hematochezia. diarrhea+  : No increased frequency, dysuria, hematuria, nocturia  MSK: No joint pain/swelling; no back pain; no edema  Neuro: No dizziness/lightheadedness, weakness, seizures, numbness, tingling  Heme: No easy bruising or bleeding  Endo: No heat/cold intolerance  Psych: No significant nervousness, anxiety, stress, depression    All other systems were reviewed and are negative, except as noted.    VITALS/PHYSICAL EXAM  --------------------------------------------------------------------------------  T(C): 37 (08-10-18 @ 09:32), Max: 37.2 (08-09-18 @ 23:30)  HR: 81 (08-10-18 @ 09:32) (81 - 103)  BP: 133/74 (08-10-18 @ 09:32) (108/50 - 151/84)  RR: 18 (08-10-18 @ 09:32) (16 - 18)  SpO2: 96% (08-10-18 @ 09:32) (96% - 100%)  Wt(kg): --        08-09-18 @ 07:01  -  08-10-18 @ 07:00  --------------------------------------------------------  IN: 100 mL / OUT: 625 mL / NET: -525 mL      Physical Exam:  	 Gen: NAD  	Pulm: CTA B/L  	CV: RRR, S1S2; no rub  	Abd: +BS, soft, nontender/nondistended  	: No suprapubic tenderness  	UE: Warm, no edema  	LE: Warm, trace edema  	Skin: Warm, without rashes  	Vascular access: YOMAIRA TRUJILLO  LABS/STUDIES  --------------------------------------------------------------------------------              8.9    3.4   >-----------<  222      [08-10-18 @ 06:57]              28.9     138  |  98  |  60  ----------------------------<  163      [08-10-18 @ 06:55]  3.9   |  23  |  11.84        Ca     9.1     [08-10-18 @ 06:55]      Mg     2.0     [08-10-18 @ 06:55]      Phos  5.2     [08-10-18 @ 06:55]      PT/INR: PT 11.5 , INR 1.06       [08-09-18 @ 09:34]  PTT: 32.2       [08-09-18 @ 09:34]      Creatinine Trend:  SCr 11.84 [08-10 @ 06:55]  SCr 9.69 [08-09 @ 07:07]  SCr 10.92 [08-08 @ 06:26]  SCr 9.78 [08-07 @ 05:56]  SCr 8.44 [08-06 @ 06:12]        HbA1c 6.0      [07-17-18 @ 16:53]    HBsAb 5.6      [07-18-18 @ 19:20]  HBsAg Nonreact      [07-18-18 @ 19:20]  HBcAb Nonreact      [07-18-18 @ 19:20]  HCV 0.27, Nonreact      [07-18-18 @ 19:20]

## 2018-08-11 LAB
ANION GAP SERPL CALC-SCNC: 17 MMOL/L — SIGNIFICANT CHANGE UP (ref 5–17)
BUN SERPL-MCNC: 65 MG/DL — HIGH (ref 7–23)
CALCIUM SERPL-MCNC: 8.7 MG/DL — SIGNIFICANT CHANGE UP (ref 8.4–10.5)
CHLORIDE SERPL-SCNC: 96 MMOL/L — SIGNIFICANT CHANGE UP (ref 96–108)
CO2 SERPL-SCNC: 20 MMOL/L — LOW (ref 22–31)
CREAT SERPL-MCNC: 11.72 MG/DL — HIGH (ref 0.5–1.3)
CULTURE RESULTS: SIGNIFICANT CHANGE UP
GLUCOSE BLDC GLUCOMTR-MCNC: 151 MG/DL — HIGH (ref 70–99)
GLUCOSE BLDC GLUCOMTR-MCNC: 154 MG/DL — HIGH (ref 70–99)
GLUCOSE BLDC GLUCOMTR-MCNC: 189 MG/DL — HIGH (ref 70–99)
GLUCOSE BLDC GLUCOMTR-MCNC: 193 MG/DL — HIGH (ref 70–99)
GLUCOSE SERPL-MCNC: 195 MG/DL — HIGH (ref 70–99)
HCT VFR BLD CALC: 26.2 % — LOW (ref 39–50)
HGB BLD-MCNC: 8.5 G/DL — LOW (ref 13–17)
MAGNESIUM SERPL-MCNC: 2 MG/DL — SIGNIFICANT CHANGE UP (ref 1.6–2.6)
MCHC RBC-ENTMCNC: 31.9 PG — SIGNIFICANT CHANGE UP (ref 27–34)
MCHC RBC-ENTMCNC: 32.3 GM/DL — SIGNIFICANT CHANGE UP (ref 32–36)
MCV RBC AUTO: 98.7 FL — SIGNIFICANT CHANGE UP (ref 80–100)
PHOSPHATE SERPL-MCNC: 5.3 MG/DL — HIGH (ref 2.5–4.5)
PLATELET # BLD AUTO: 235 K/UL — SIGNIFICANT CHANGE UP (ref 150–400)
POTASSIUM SERPL-MCNC: 3.8 MMOL/L — SIGNIFICANT CHANGE UP (ref 3.5–5.3)
POTASSIUM SERPL-SCNC: 3.8 MMOL/L — SIGNIFICANT CHANGE UP (ref 3.5–5.3)
RBC # BLD: 2.66 M/UL — LOW (ref 4.2–5.8)
RBC # FLD: 17.4 % — HIGH (ref 10.3–14.5)
SODIUM SERPL-SCNC: 133 MMOL/L — LOW (ref 135–145)
SPECIMEN SOURCE: SIGNIFICANT CHANGE UP
TACROLIMUS SERPL-MCNC: 6.5 NG/ML — SIGNIFICANT CHANGE UP
WBC # BLD: 3.2 K/UL — LOW (ref 3.8–10.5)
WBC # FLD AUTO: 3.2 K/UL — LOW (ref 3.8–10.5)

## 2018-08-11 PROCEDURE — 99233 SBSQ HOSP IP/OBS HIGH 50: CPT

## 2018-08-11 PROCEDURE — 99233 SBSQ HOSP IP/OBS HIGH 50: CPT | Mod: GC,24

## 2018-08-11 RX ORDER — TACROLIMUS 5 MG/1
3.5 CAPSULE ORAL
Qty: 0 | Refills: 0 | Status: DISCONTINUED | OUTPATIENT
Start: 2018-08-11 | End: 2018-08-12

## 2018-08-11 RX ORDER — VALGANCICLOVIR 450 MG/1
450 TABLET, FILM COATED ORAL
Qty: 0 | Refills: 0 | Status: DISCONTINUED | OUTPATIENT
Start: 2018-08-11 | End: 2018-08-12

## 2018-08-11 RX ADMIN — Medication 1 TABLET(S): at 02:25

## 2018-08-11 RX ADMIN — Medication 1: at 17:25

## 2018-08-11 RX ADMIN — TACROLIMUS 3.5 MILLIGRAM(S): 5 CAPSULE ORAL at 18:08

## 2018-08-11 RX ADMIN — Medication 1 TABLET(S): at 05:47

## 2018-08-11 RX ADMIN — Medication 500000 UNIT(S): at 05:47

## 2018-08-11 RX ADMIN — Medication 1: at 12:32

## 2018-08-11 RX ADMIN — Medication 500000 UNIT(S): at 12:33

## 2018-08-11 RX ADMIN — TACROLIMUS 3 MILLIGRAM(S): 5 CAPSULE ORAL at 05:48

## 2018-08-11 RX ADMIN — Medication 1 TABLET(S): at 17:25

## 2018-08-11 RX ADMIN — Medication 1 TABLET(S): at 23:04

## 2018-08-11 RX ADMIN — Medication 500000 UNIT(S): at 23:04

## 2018-08-11 RX ADMIN — Medication 500000 UNIT(S): at 17:26

## 2018-08-11 RX ADMIN — Medication 1 TABLET(S): at 13:07

## 2018-08-11 RX ADMIN — SODIUM CHLORIDE 75 MILLILITER(S): 9 INJECTION INTRAMUSCULAR; INTRAVENOUS; SUBCUTANEOUS at 17:28

## 2018-08-11 RX ADMIN — Medication 1 TABLET(S): at 17:33

## 2018-08-11 RX ADMIN — TAMSULOSIN HYDROCHLORIDE 0.4 MILLIGRAM(S): 0.4 CAPSULE ORAL at 23:04

## 2018-08-11 RX ADMIN — Medication 500000 UNIT(S): at 02:25

## 2018-08-11 RX ADMIN — GABAPENTIN 300 MILLIGRAM(S): 400 CAPSULE ORAL at 12:33

## 2018-08-11 RX ADMIN — MYCOPHENOLATE MOFETIL 500 MILLIGRAM(S): 250 CAPSULE ORAL at 17:25

## 2018-08-11 RX ADMIN — Medication 1: at 08:41

## 2018-08-11 RX ADMIN — MYCOPHENOLATE MOFETIL 500 MILLIGRAM(S): 250 CAPSULE ORAL at 05:47

## 2018-08-11 RX ADMIN — Medication 5 MILLIGRAM(S): at 05:50

## 2018-08-11 NOTE — PROGRESS NOTE ADULT - SUBJECTIVE AND OBJECTIVE BOX
[facility] DIVISION OF KIDNEY DISEASES AND HYPERTENSION -- FOLLOW UP NOTE  --------------------------------------------------------------------------------  Chief Complaint:/subjective: no complaints no sob    24 hour events: increased UO        PAST HISTORY  --------------------------------------------------------------------------------  No significant changes to PMH, PSH, FHx, SHx, unless otherwise noted    ALLERGIES & MEDICATIONS  --------------------------------------------------------------------------------  Allergies    No Known Allergies    Intolerances      Standing Inpatient Medications  dextrose 5%. 1000 milliLiter(s) IV Continuous <Continuous>  dextrose 50% Injectable 12.5 Gram(s) IV Push once  dextrose 50% Injectable 25 Gram(s) IV Push once  dextrose 50% Injectable 25 Gram(s) IV Push once  diphenoxylate/atropine 1 Tablet(s) Oral four times a day  Epclusa 400-100mg 1 Tablet(s) 1 Tablet(s) Oral at bedtime  gabapentin 300 milliGRAM(s) Oral daily  insulin lispro (HumaLOG) corrective regimen sliding scale   SubCutaneous three times a day before meals  insulin lispro (HumaLOG) corrective regimen sliding scale   SubCutaneous at bedtime  mycophenolate mofetil 500 milliGRAM(s) Oral two times a day  nystatin    Suspension 106316 Unit(s) Oral four times a day  predniSONE   Tablet 5 milliGRAM(s) Oral daily  sodium chloride 0.9%. 1000 milliLiter(s) IV Continuous <Continuous>  tacrolimus 3 milliGRAM(s) Oral two times a day  tamsulosin 0.4 milliGRAM(s) Oral at bedtime  trimethoprim   80 mG/sulfamethoxazole 400 mG 1 Tablet(s) Oral daily  valGANciclovir 450 milliGRAM(s) Oral <User Schedule>    PRN Inpatient Medications  acetaminophen   Tablet. 650 milliGRAM(s) Oral every 6 hours PRN  dextrose 40% Gel 15 Gram(s) Oral once PRN  glucagon  Injectable 1 milliGRAM(s) IntraMuscular once PRN  morphine  - Injectable 2 milliGRAM(s) IV Push every 4 hours PRN      REVIEW OF SYSTEMS  --------------------------------------------------------------------------------  Gen: No weight changes, fatigue, fevers/chills, weakness  Skin: No rashes  Head/Eyes/Ears/Mouth: No headache;   Respiratory: No dyspnea, cough  CV: No chest pain, PND, orthopnea  GI: No abdominal pain, diarrhea, constipation, nausea, vomiting  : No increased frequency, dysuria, hematuria, nocturia  MSK: No joint pain/swelling; no back pain; no edema  Neuro: No dizziness/lightheadedness, weakness  Heme: No easy bruising or bleeding  Psych: No significant nervousness, anxiety, stress, depression    All other systems were reviewed and are negative, except as noted.    VITALS/PHYSICAL EXAM  --------------------------------------------------------------------------------  T(C): 37 (08-11-18 @ 10:50), Max: 37 (08-11-18 @ 10:50)  HR: 88 (08-11-18 @ 10:50) (78 - 88)  BP: 126/69 (08-11-18 @ 10:50) (115/68 - 138/75)  RR: 18 (08-11-18 @ 10:50) (18 - 18)  SpO2: 96% (08-11-18 @ 10:50) (96% - 100%)  Wt(kg): --  Adult Advanced Hemodynamics Last 24 Hrs  ABP: --  ABP(mean): --  CVP(mm Hg): --  CO: --  CI: --  PA: --  PA(mean): --  PCWP: --  SVR: --  SVRI: --        08-10-18 @ 07:01  -  08-11-18 @ 07:00  --------------------------------------------------------  IN: 1995 mL / OUT: 1400 mL / NET: 595 mL    08-11-18 @ 07:01  -  08-11-18 @ 13:51  --------------------------------------------------------  IN: 240 mL / OUT: 700 mL / NET: -460 mL      Physical Exam:  	Gen: NAD,    	HEENT: no jvp  	Pulm: CTA B/L  	CV: RRR, S1S2; no rub  	Back:   no sacral edema  	Abd: +BS, soft,    	: No suprapubic tenderness  	Ext: no edema  	Neuro: awake  	Psych: alert  	Skin: Warm,    	Vascular access:    LABS/STUDIES  --------------------------------------------------------------------------------              8.5    3.2   >-----------<  235      [08-11-18 @ 07:22]              26.2     Hemoglobin: 8.5 g/dL (08-11-18 @ 07:22)  Hemoglobin: 8.9 g/dL (08-10-18 @ 06:57)    Platelet Count - Automated: 235 K/uL (08-11-18 @ 07:22)  Platelet Count - Automated: 222 K/uL (08-10-18 @ 06:57)    133  |  96  |  65  ----------------------------<  195      [08-11-18 @ 07:18]  3.8   |  20  |  11.72        Ca     8.7     [08-11-18 @ 07:18]      Mg     2.0     [08-11-18 @ 07:18]      Phos  5.3     [08-11-18 @ 07:18]            Creatinine Trend:  SCr 11.72 [08-11 @ 07:18]  SCr 11.84 [08-10 @ 06:55]  SCr 9.69 [08-09 @ 07:07]  SCr 10.92 [08-08 @ 06:26]  SCr 9.78 [08-07 @ 05:56]        HbA1c 6.0      [07-17-18 @ 16:53]    HBsAb 5.6      [07-18-18 @ 19:20]  HBsAg Nonreact      [07-18-18 @ 19:20]  HBcAb Nonreact      [07-18-18 @ 19:20]  HCV 0.27, Nonreact      [07-18-18 @ 19:20]

## 2018-08-11 NOTE — PROGRESS NOTE ADULT - ATTENDING COMMENTS
Poor graft function. Good UOP but not clearing well.  Bx with ATN.    Appears euvolemic.    Diarrhea improved with Lomotil.    Immuno: tac goal 8-10, cellcept, pred

## 2018-08-11 NOTE — PROVIDER CONTACT NOTE (OTHER) - SITUATION
Pt received with no PIV; Pt extremely hard stick requiring US guided PIV placement. MD was notified once earlier at the beginning of the shift as well.

## 2018-08-11 NOTE — PROGRESS NOTE ADULT - PROBLEM SELECTOR PLAN 2
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  - Continue Valcyte - stopped if patient develops leukopenia  - Continue Cellcept

## 2018-08-11 NOTE — PROGRESS NOTE ADULT - PROBLEM SELECTOR PLAN 1
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  Stable H/H  -FU PT/INR, repeat CBC  - Last HD session 8/8/18.   - BMP daily, Strict I/Os  - Diabetic, renal diet as tolerated  - SCDs, Ambulate as tolerated.  - C. Diff negative  - f/u kidney biopsy results  - c/w lomotil for diarrhea

## 2018-08-11 NOTE — PROGRESS NOTE ADULT - PROBLEM SELECTOR PLAN 1
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  CHELA- ATN  -Creatinine stable may have plateaued ; UO increasing nonoliguric; hold off HD today  -lytes and volume status stable; will monitor  -continue immunosuppression (Tacro 2.5 q12h) and pred 5 and cellcept  -need for prophylaxis: valcyte and bactrim for cmv and pcp prophylaxis

## 2018-08-11 NOTE — PROGRESS NOTE ADULT - SUBJECTIVE AND OBJECTIVE BOX
Transplant Surgery   HCV + DDRT 7/17/2018     Present: 67 year old male s/p HCV + donor DDRT on 7/17/2018, who was sent in from the office today for hemoglobin to 6.7.  DONOR 40M KDPI 30%. CIT 22 hours.  Simulect induction. His post-operative course was complicated by delayed graft function requiring TIW HD and PO Lasix 80mg BID.      Patient examined at bedside. NAEON, GI PCR results sill pending. No other complaints at this time.    Potential Discharge date 8/13      MEDICATIONS  (STANDING):  dextrose 5%. 1000 milliLiter(s) (50 mL/Hr) IV Continuous <Continuous>  dextrose 50% Injectable 12.5 Gram(s) IV Push once  dextrose 50% Injectable 25 Gram(s) IV Push once  dextrose 50% Injectable 25 Gram(s) IV Push once  diphenoxylate/atropine 1 Tablet(s) Oral four times a day  Epclusa 400-100mg 1 Tablet(s) 1 Tablet(s) Oral at bedtime  gabapentin 300 milliGRAM(s) Oral daily  insulin lispro (HumaLOG) corrective regimen sliding scale   SubCutaneous three times a day before meals  insulin lispro (HumaLOG) corrective regimen sliding scale   SubCutaneous at bedtime  mycophenolate mofetil 500 milliGRAM(s) Oral two times a day  nystatin    Suspension 281506 Unit(s) Oral four times a day  predniSONE   Tablet 5 milliGRAM(s) Oral daily  sodium chloride 0.9%. 1000 milliLiter(s) (75 mL/Hr) IV Continuous <Continuous>  tacrolimus 3 milliGRAM(s) Oral two times a day  tamsulosin 0.4 milliGRAM(s) Oral at bedtime  trimethoprim   80 mG/sulfamethoxazole 400 mG 1 Tablet(s) Oral daily  valGANciclovir 450 milliGRAM(s) Oral <User Schedule>    MEDICATIONS  (PRN):  acetaminophen   Tablet. 650 milliGRAM(s) Oral every 6 hours PRN Mild Pain (1 - 3)  dextrose 40% Gel 15 Gram(s) Oral once PRN Blood Glucose LESS THAN 70 milliGRAM(s)/deciliter  glucagon  Injectable 1 milliGRAM(s) IntraMuscular once PRN Glucose LESS THAN 70 milligrams/deciliter  morphine  - Injectable 2 milliGRAM(s) IV Push every 4 hours PRN Moderate Pain (4 - 6)          PAST MEDICAL & SURGICAL HISTORY:  ESRD (end stage renal disease) on dialysis  Kidney cancer, primary, with metastasis from kidney to other site: with no mets  HTN (hypertension)  DM (diabetes mellitus)  S/p nephrectomy: right 12/10/2015  S/p nephrectomy: left 9/15/2015  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    Vital Signs Last 24 Hrs  T(C): 36.9 (11 Aug 2018 05:08), Max: 37 (10 Aug 2018 13:44)  T(F): 98.5 (11 Aug 2018 05:08), Max: 98.6 (10 Aug 2018 13:44)  HR: 82 (11 Aug 2018 05:08) (77 - 82)  BP: 115/68 (11 Aug 2018 05:08) (115/68 - 138/75)  BP(mean): --  RR: 18 (11 Aug 2018 05:08) (18 - 18)  SpO2: 99% (11 Aug 2018 05:08) (97% - 100%)                                     8.5    3.2   )-----------( 235      ( 11 Aug 2018 07:22 )             26.2       08-11    133<L>  |  96  |  65<H>  ----------------------------<  195<H>  3.8   |  20<L>  |  11.72<H>    Ca    8.7      11 Aug 2018 07:18  Phos  5.3     08-11  Mg     2.0     08-11          Review of systems  Gen: No weight changes, fatigue, fevers/chills, weakness  Skin: No rashes  Head/Eyes/Ears/Mouth: No headache; Normal hearing; Normal vision w/o blurriness; No sinus pain/discomfort, sore throat  Respiratory: No dyspnea, cough, wheezing, hemoptysis  CV: No chest pain, PND, orthopnea  GI: No abdominal pain, constipation, nausea, vomiting, melena, hematochezia. reports frequent shahid stools  : No increased frequency, dysuria, hematuria, nocturia  MSK: No joint pain/swelling; no back pain;  Neuro: No dizziness/lightheadedness, weakness, seizures, numbness, tingling  Heme: No easy bruising or bleeding  Endo: No heat/cold intolerance  Psych: No significant nervousness, anxiety, stress, depression  All other systems were reviewed and are negative, except as noted.    PHYSICAL EXAM:  Constitutional: Well developed / well nourished  Eyes: Anicteric, PERRLA  ENMT: nc/at  Neck: supple  Respiratory: CTA B/L  Cardiovascular: RRR  Gastrointestinal: Soft abdomen, ND, NT.  Genitourinary: Voiding spontaneously  Extremities: SCD's in place and working bilaterally. trace BLE edema  Vascular: Palpable dp pulses bilaterally  Neurological: A&O x3

## 2018-08-12 ENCOUNTER — TRANSCRIPTION ENCOUNTER (OUTPATIENT)
Age: 68
End: 2018-08-12

## 2018-08-12 LAB
ANION GAP SERPL CALC-SCNC: 14 MMOL/L — SIGNIFICANT CHANGE UP (ref 5–17)
BUN SERPL-MCNC: 72 MG/DL — HIGH (ref 7–23)
CALCIUM SERPL-MCNC: 8.6 MG/DL — SIGNIFICANT CHANGE UP (ref 8.4–10.5)
CHLORIDE SERPL-SCNC: 97 MMOL/L — SIGNIFICANT CHANGE UP (ref 96–108)
CO2 SERPL-SCNC: 19 MMOL/L — LOW (ref 22–31)
CREAT SERPL-MCNC: 12.01 MG/DL — HIGH (ref 0.5–1.3)
GLUCOSE BLDC GLUCOMTR-MCNC: 137 MG/DL — HIGH (ref 70–99)
GLUCOSE BLDC GLUCOMTR-MCNC: 192 MG/DL — HIGH (ref 70–99)
GLUCOSE BLDC GLUCOMTR-MCNC: 201 MG/DL — HIGH (ref 70–99)
GLUCOSE BLDC GLUCOMTR-MCNC: 240 MG/DL — HIGH (ref 70–99)
GLUCOSE SERPL-MCNC: 107 MG/DL — HIGH (ref 70–99)
HCT VFR BLD CALC: 24.7 % — LOW (ref 39–50)
HGB BLD-MCNC: 8.2 G/DL — LOW (ref 13–17)
MAGNESIUM SERPL-MCNC: 1.9 MG/DL — SIGNIFICANT CHANGE UP (ref 1.6–2.6)
MCHC RBC-ENTMCNC: 32.3 PG — SIGNIFICANT CHANGE UP (ref 27–34)
MCHC RBC-ENTMCNC: 33 GM/DL — SIGNIFICANT CHANGE UP (ref 32–36)
MCV RBC AUTO: 97.9 FL — SIGNIFICANT CHANGE UP (ref 80–100)
PHOSPHATE SERPL-MCNC: 5.7 MG/DL — HIGH (ref 2.5–4.5)
PLATELET # BLD AUTO: 248 K/UL — SIGNIFICANT CHANGE UP (ref 150–400)
POTASSIUM SERPL-MCNC: 4.2 MMOL/L — SIGNIFICANT CHANGE UP (ref 3.5–5.3)
POTASSIUM SERPL-SCNC: 4.2 MMOL/L — SIGNIFICANT CHANGE UP (ref 3.5–5.3)
RBC # BLD: 2.53 M/UL — LOW (ref 4.2–5.8)
RBC # FLD: 17.1 % — HIGH (ref 10.3–14.5)
SODIUM SERPL-SCNC: 130 MMOL/L — LOW (ref 135–145)
TACROLIMUS SERPL-MCNC: 5.9 NG/ML — SIGNIFICANT CHANGE UP
WBC # BLD: 2.4 K/UL — LOW (ref 3.8–10.5)
WBC # FLD AUTO: 2.4 K/UL — LOW (ref 3.8–10.5)

## 2018-08-12 PROCEDURE — 99233 SBSQ HOSP IP/OBS HIGH 50: CPT

## 2018-08-12 PROCEDURE — 99233 SBSQ HOSP IP/OBS HIGH 50: CPT | Mod: GC,24

## 2018-08-12 RX ORDER — ACETAMINOPHEN 500 MG
2 TABLET ORAL
Qty: 0 | Refills: 0 | DISCHARGE
Start: 2018-08-12

## 2018-08-12 RX ORDER — NYSTATIN 500MM UNIT
5 POWDER (EA) MISCELLANEOUS
Qty: 0 | Refills: 0 | COMMUNITY
Start: 2018-08-12

## 2018-08-12 RX ORDER — DIPHENOXYLATE HCL/ATROPINE 2.5-.025MG
2 TABLET ORAL
Qty: 0 | Refills: 0 | Status: DISCONTINUED | OUTPATIENT
Start: 2018-08-12 | End: 2018-08-15

## 2018-08-12 RX ORDER — TACROLIMUS 5 MG/1
4 CAPSULE ORAL
Qty: 0 | Refills: 0 | Status: DISCONTINUED | OUTPATIENT
Start: 2018-08-12 | End: 2018-08-15

## 2018-08-12 RX ORDER — GABAPENTIN 400 MG/1
1 CAPSULE ORAL
Qty: 0 | Refills: 0 | COMMUNITY
Start: 2018-08-12

## 2018-08-12 RX ORDER — METOPROLOL TARTRATE 50 MG
100 TABLET ORAL DAILY
Qty: 0 | Refills: 0 | Status: DISCONTINUED | OUTPATIENT
Start: 2018-08-12 | End: 2018-08-12

## 2018-08-12 RX ORDER — FUROSEMIDE 40 MG
80 TABLET ORAL
Qty: 0 | Refills: 0 | COMMUNITY

## 2018-08-12 RX ADMIN — Medication 5 MILLIGRAM(S): at 06:04

## 2018-08-12 RX ADMIN — MYCOPHENOLATE MOFETIL 500 MILLIGRAM(S): 250 CAPSULE ORAL at 06:04

## 2018-08-12 RX ADMIN — MYCOPHENOLATE MOFETIL 500 MILLIGRAM(S): 250 CAPSULE ORAL at 17:43

## 2018-08-12 RX ADMIN — TACROLIMUS 3.5 MILLIGRAM(S): 5 CAPSULE ORAL at 06:05

## 2018-08-12 RX ADMIN — TACROLIMUS 4 MILLIGRAM(S): 5 CAPSULE ORAL at 17:46

## 2018-08-12 RX ADMIN — Medication 500000 UNIT(S): at 12:23

## 2018-08-12 RX ADMIN — Medication 1: at 16:59

## 2018-08-12 RX ADMIN — TAMSULOSIN HYDROCHLORIDE 0.4 MILLIGRAM(S): 0.4 CAPSULE ORAL at 22:43

## 2018-08-12 RX ADMIN — Medication 2 TABLET(S): at 17:42

## 2018-08-12 RX ADMIN — Medication 2: at 12:26

## 2018-08-12 RX ADMIN — Medication 500000 UNIT(S): at 23:18

## 2018-08-12 RX ADMIN — Medication 2 TABLET(S): at 12:31

## 2018-08-12 RX ADMIN — Medication 1 TABLET(S): at 06:04

## 2018-08-12 RX ADMIN — Medication 500000 UNIT(S): at 17:43

## 2018-08-12 RX ADMIN — Medication 2 TABLET(S): at 23:18

## 2018-08-12 RX ADMIN — Medication 500000 UNIT(S): at 06:04

## 2018-08-12 RX ADMIN — Medication 1 TABLET(S): at 12:24

## 2018-08-12 RX ADMIN — GABAPENTIN 300 MILLIGRAM(S): 400 CAPSULE ORAL at 12:23

## 2018-08-12 NOTE — DISCHARGE NOTE ADULT - CARE PROVIDERS DIRECT ADDRESSES
[email or web address]

## 2018-08-12 NOTE — DISCHARGE NOTE ADULT - CARE PROVIDER_API CALL
Maxwell Yao), Surgery  [address] [place] [address]  Phone: [phone]  Fax: [phone]    Arian Bhakta), Nephrology  [address] [place] [address]  Phone: [phone]  Fax: [phone]

## 2018-08-12 NOTE — PROGRESS NOTE ADULT - PROBLEM SELECTOR PLAN 2
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

## 2018-08-12 NOTE — DISCHARGE NOTE ADULT - PATIENT PORTAL LINK FT
You can access the [facility], offered by [facility], by registering with the following website: [email or web address]

## 2018-08-12 NOTE — DISCHARGE NOTE ADULT - HOSPITAL COURSE
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 (HEP C + DONOR) with CIT of 23 hours done on 7/17/18. Induction with Simulect.  His post-operative course was complicated by delayed graft function and he was on home dialysis.  He was admitted to hospital following an appointment with Transplant Nephrologist where he was found to have Anemia, hgb 6.7.  He received a total 3???? units PRBC for this with good response.  Renal ultrasound revealed a [id number] donal-nephric evolving hematoma. It was determined that he was using heparin in his dialysate at home and this was the likely etiology.  His H/H remained stable thereafter and repeat usg revealed stable hematoma.  Over the course of the hospitalization, his urine output increased and his fluid volume status remained stable, however his creatinine remained elevated.  His Cellcept was held and Valcyte was held due to leukopenia.  He received a single dose neupogen 300mcg.  Renal biopsy revealed ATN.   He was dialyzed intermittently. His immunosuppression was optimized and he was dialyzed on the day of discharge.  He was tolerating a regular diet, was ambulatory and had bowel function.  He was evaluated by the multi-disciplinary team including surgeon, nephrologist, pharmacist, nutrition, and social work and was deemed stable for discharge to home. 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 (HEP C + DONOR) with CIT of 23 hours done on 7/17/18. Induction with Simulect.  His post-operative course was complicated by delayed graft function and home dialysis was resumed. He was admitted to hospital following an appointment with Transplant Nephrologist where he was found to have Anemia, hgb 6.7.  He received a total 3 units PRBC for this with good response.  Renal ultrasound revealed a [id number] donal-nephric evolving hematoma. It was determined that he was using heparin in his dialysate at home and this was the likely etiology.  His H/H remained stable thereafter and repeat usg revealed stable hematoma.  Over the course of the hospitalization, his urine output increased and his fluid volume status remained stable, however his bun/creatinine remained elevated.  His Cellcept and Valcyte were held due to leukopenia.  He received total two doses Neupogen 300mcg.  He was dialyzed and then underwent Renal biopsy which revealed ATN.  His immunosuppression was optimized. He was given one dose procrit and was dialyzed on the day of discharge.  He was tolerating a regular diet, was ambulatory and had bowel function.  He had some loose watery stools with no cause identified on all stool cultures sent.  He was started on Lomotil and his stool frequency declined.  He was evaluated by the multi-disciplinary team including surgeon, nephrologist, pharmacist, and social work and was deemed stable for discharge to home with follow-up in two days. 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 (HEP C + DONOR) with CIT of 23 hours done on 7/17/18. Induction with Simulect.  post-operative course was complicated by delayed graft function and home dialysis was resumed.  Admitted with hemoglobin 6.7 found to have [id number] donal-nephric evolving hematoma inferior to kidney transplant on ultrasound likely related to use of heparin in dialysate at home, now stable, still with poor graft function urine output increased and fluid volume status remained stable.  s/p first renal biopsy revealing ATN. S/P second biopsy 8/13/18 with mild rejection started on pulse steroid yesterday. Cellcept on hold due to diarrhea, thrombocytopenia, restarted on Valcyte. Immunosuppression optimized,  tolerating a regular diet,  ambulatory and had bowel function.  During hospital stay had some loose watery stools with no cause identified on all stool cultures sent, was started on Lomotil with decreased stool frequency. Has been evaluated by the multi-disciplinary team including surgeon, nephrologist, pharmacist, and social work daily and was deemed stable for discharge to home with follow-up in two days.   Pt will need HD as he has set up at home daily HD 5x week before transplant, case management arranged for restart of HD with HD RN visiting at home once cleared by transplant team as of now for discharge pt instructed not to perform HD at home until Friday follow up and decision will be made that day.

## 2018-08-12 NOTE — DISCHARGE NOTE ADULT - MEDICATION SUMMARY - MEDICATIONS TO CHANGE
I will SWITCH the dose or number of times a day I take the medications listed below when I get home from the hospital:    predniSONE 5 mg oral tablet  -- 1 tab(s) by mouth once a day    valGANciclovir 450 mg oral tablet  -- 2 tab(s) by mouth once a day    tacrolimus 1 mg oral capsule  -- 6 cap(s) by mouth 2 times a day

## 2018-08-12 NOTE — PROGRESS NOTE ADULT - PROBLEM SELECTOR PLAN 1
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  Stable H/H  -8/9/2018 Renal biopsy indicating ATN  - Last HD session 8/8/18.   - BMP daily, Strict I/Os  - Diabetic, renal diet as tolerated  - SCDs, Ambulate as tolerated.  - C. Diff negative  - c/w lomotil for diarrhea.  - plan for HD tomorrow and likely discharge to home

## 2018-08-12 NOTE — DISCHARGE NOTE ADULT - PLAN OF CARE
to be free of infection and rejection No heavy lifting anything more than 10-15lbs or straining. Otherwise, you may return to your usual level of physical activity. If you are taking narcotic pain medication (such as Percocet), do NOT drive a car, operate machinery or make important decisions.  Call transplant clinic If you developed any of the following, fever, pain, redness, swelling at incision site, cough, nausea, vomiting, painful urination, difficulty urination, or not making any urine.  NOTIFY YOUR SURGEON IF: You have any bleeding that does not stop, any pus draining from your wound, any fever (over 100.4 F) or chills, persistent nausea/vomiting with inability to tolerate food or liquids, persistent diarrhea, or if your pain is not controlled on your discharge pain medications. Please take only the medications listed on your discharge paperwork and follow-up with your endocrinologist for long term management Please take only the medications listed on your discharge paperwork and follow-up with your cardiologist for long term management. Keep away from people who have cough, cold, and symptom of flu.  Only take medications that are on your discharge list  If you missed your medications call the transplant office, do not double up medication because you missed a dose.  If you have any question regarding your medication please call transplant office.

## 2018-08-12 NOTE — PROGRESS NOTE ADULT - PROBLEM SELECTOR PLAN 1
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  CHELA- ATN  -Creatinine stable may have plateaued ; UO increasing nonoliguric; no plan for HD today. Will arrange for HD tomorrow.   -lytes and volume status stable; will monitor  -continue immunosuppression (Tacro 2.5 q12h) and pred 5 and cellcept  -need for prophylaxis: valcyte and bactrim for cmv and pcp prophylaxis

## 2018-08-12 NOTE — DISCHARGE NOTE ADULT - CARE PLAN
Principal Discharge DX:	Kidney transplanted  Goal:	to be free of infection and rejection  Assessment and plan of treatment:	No heavy lifting anything more than 10-15lbs or straining. Otherwise, you may return to your usual level of physical activity. If you are taking narcotic pain medication (such as Percocet), do NOT drive a car, operate machinery or make important decisions.  Call transplant clinic If you developed any of the following, fever, pain, redness, swelling at incision site, cough, nausea, vomiting, painful urination, difficulty urination, or not making any urine.  NOTIFY YOUR SURGEON IF: You have any bleeding that does not stop, any pus draining from your wound, any fever (over 100.4 F) or chills, persistent nausea/vomiting with inability to tolerate food or liquids, persistent diarrhea, or if your pain is not controlled on your discharge pain medications.  Secondary Diagnosis:	DM (diabetes mellitus)  Assessment and plan of treatment:	Please take only the medications listed on your discharge paperwork and follow-up with your endocrinologist for long term management  Secondary Diagnosis:	HTN (hypertension)  Assessment and plan of treatment:	Please take only the medications listed on your discharge paperwork and follow-up with your cardiologist for long term management.  Secondary Diagnosis:	Immunosuppression  Assessment and plan of treatment:	Keep away from people who have cough, cold, and symptom of flu.  Only take medications that are on your discharge list  If you missed your medications call the transplant office, do not double up medication because you missed a dose.  If you have any question regarding your medication please call transplant office.

## 2018-08-12 NOTE — PROGRESS NOTE ADULT - ATTENDING COMMENTS
R tx with DGF, ATN- cr stable, lytes stable; held off hd yesterday; dandre will plan for hd tomorrow for dc planning  immunosuppression- cont tacro, cellcept  prophylaxis- cont valcyte and bactrim for CMV and PCP prophylaxis  Anemia-hematoma- hb stable; monitor trend

## 2018-08-12 NOTE — PROGRESS NOTE ADULT - SUBJECTIVE AND OBJECTIVE BOX
Transplant Surgery - Multidisciplinary Rounds  --------------------------------------------------------------  HCV + DDRT 7/17/2018     Present: 67 year old male s/p HCV + donor DDRT on 7/17/2018, who was sent in from the office today for hemoglobin to 6.7.  DONOR 40M KDPI 30%. CIT 22 hours.  Simulect induction. His post-operative course was complicated by delayed graft function requiring TIW HD and PO Lasix 80mg BID.      Patient examined at bedside. Kidney biopsy revelaed ATN, making good urine, stable fluid volume status this morning, BUN/Creat 72/12.01, K+4.2    Potential Discharge date 8/13    PLan: see assessment and plan of care     MEDICATIONS  (STANDING):  dextrose 5%. 1000 milliLiter(s) (50 mL/Hr) IV Continuous <Continuous>  dextrose 50% Injectable 12.5 Gram(s) IV Push once  dextrose 50% Injectable 25 Gram(s) IV Push once  dextrose 50% Injectable 25 Gram(s) IV Push once  diphenoxylate/atropine 2 Tablet(s) Oral four times a day  Epclusa 400-100mg 1 Tablet(s) 1 Tablet(s) Oral at bedtime  gabapentin 300 milliGRAM(s) Oral daily  insulin lispro (HumaLOG) corrective regimen sliding scale   SubCutaneous three times a day before meals  insulin lispro (HumaLOG) corrective regimen sliding scale   SubCutaneous at bedtime  mycophenolate mofetil 500 milliGRAM(s) Oral two times a day  nystatin    Suspension 462524 Unit(s) Oral four times a day  predniSONE   Tablet 5 milliGRAM(s) Oral daily  tacrolimus 3.5 milliGRAM(s) Oral two times a day  tamsulosin 0.4 milliGRAM(s) Oral at bedtime  trimethoprim   80 mG/sulfamethoxazole 400 mG 1 Tablet(s) Oral daily    MEDICATIONS  (PRN):  acetaminophen   Tablet. 650 milliGRAM(s) Oral every 6 hours PRN Mild Pain (1 - 3)  dextrose 40% Gel 15 Gram(s) Oral once PRN Blood Glucose LESS THAN 70 milliGRAM(s)/deciliter  glucagon  Injectable 1 milliGRAM(s) IntraMuscular once PRN Glucose LESS THAN 70 milligrams/deciliter  morphine  - Injectable 2 milliGRAM(s) IV Push every 4 hours PRN Moderate Pain (4 - 6)      PAST MEDICAL & SURGICAL HISTORY:  ESRD (end stage renal disease) on dialysis  Kidney cancer, primary, with metastasis from kidney to other site: with no mets  HTN (hypertension)  DM (diabetes mellitus)  S/p nephrectomy: right 12/10/2015  S/p nephrectomy: left 9/15/2015  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      Vital Signs Last 24 Hrs  T(C): 37 (12 Aug 2018 09:41), Max: 37.1 (11 Aug 2018 21:09)  T(F): 98.6 (12 Aug 2018 09:41), Max: 98.8 (11 Aug 2018 21:09)  HR: 70 (12 Aug 2018 09:41) (70 - 81)  BP: 130/70 (12 Aug 2018 09:41) (130/70 - 158/81)  BP(mean): --  RR: 16 (12 Aug 2018 09:41) (16 - 18)  SpO2: 99% (12 Aug 2018 09:41) (98% - 100%)    I&O's Summary    11 Aug 2018 07:01  -  12 Aug 2018 07:00  --------------------------------------------------------  IN: 970 mL / OUT: 1300 mL / NET: -330 mL    12 Aug 2018 07:01  -  12 Aug 2018 12:15  --------------------------------------------------------  IN: 120 mL / OUT: 280 mL / NET: -160 mL                              8.2    2.4   )-----------( 248      ( 12 Aug 2018 06:00 )             24.7     08-12    130<L>  |  97  |  72<H>  ----------------------------<  107<H>  4.2   |  19<L>  |  12.01<H>    Ca    8.6      12 Aug 2018 06:00  Phos  5.7     08-12  Mg     1.9     08-12      Tacrolimus (), Serum: 5.9 ng/mL (08-12 @ 06:37)    Review of systems  Gen: No weight changes, fatigue, fevers/chills, weakness  Skin: No rashes  Head/Eyes/Ears/Mouth: No headache; Normal hearing; Normal vision w/o blurriness; No sinus pain/discomfort, sore throat  Respiratory: No dyspnea, cough, wheezing, hemoptysis  CV: No chest pain, PND, orthopnea  GI: No abdominal pain, constipation, nausea, vomiting, melena, hematochezia.   : No increased frequency, dysuria, hematuria, nocturia  MSK: No joint pain/swelling; no back pain;  Neuro: No dizziness/lightheadedness, weakness, seizures, numbness, tingling  Heme: No easy bruising or bleeding  Endo: No heat/cold intolerance  Psych: No significant nervousness, anxiety, stress, depression  All other systems were reviewed and are negative, except as noted.    PHYSICAL EXAM:  Constitutional: Well developed / well nourished  Eyes: Anicteric, PERRLA  ENMT: nc/at  Neck: supple  Respiratory: CTA B/L  Cardiovascular: RRR  Gastrointestinal: Soft abdomen, ND, NT.  Genitourinary: Voiding spontaneously  Extremities: SCD's in place and working bilaterally. trace BLE edema  Vascular: Palpable dp pulses bilaterally  Neurological: A&O x3

## 2018-08-12 NOTE — DISCHARGE NOTE ADULT - ADDITIONAL INSTRUCTIONS
1. Please call to make a follow-up appointment with Dr. Yao early next week  2. Please follow up with your primary care physician in one week regarding your hospitalization. 1. Please call to make a follow-up appointment with Dr. Yao in the transplant clinic for Wednesday August 15, 2018.  2. Please call to make a follow-up appointment with your primary care physician 1 week after discharge. 1. Please call to make a follow-up appointment with Dr. Yao in the transplant clinic for Friday August 17, 2018.  2. Please call to make a follow-up appointment with your primary care physician 1 week after discharge.

## 2018-08-12 NOTE — DISCHARGE NOTE ADULT - MEDICATION SUMMARY - MEDICATIONS TO STOP TAKING
I will STOP taking the medications listed below when I get home from the hospital:    aspirin 81 mg oral tablet, chewable  -- 1 tab(s) by mouth once a day    mycophenolate mofetil 250 mg oral capsule  -- 1000mg by mouth two times daily    docusate sodium 100 mg oral capsule  -- 1 cap(s) by mouth 2 times a day    senna oral tablet  -- 2 tab(s) by mouth once a day (at bedtime)    furosemide 80 mg oral tablet  -- 80 milligram(s) by mouth 2 times a day    Zaroxolyn 5 mg oral tablet  -- 1 tab(s) by mouth once a day

## 2018-08-12 NOTE — PROGRESS NOTE ADULT - SUBJECTIVE AND OBJECTIVE BOX
[facility] Division of Kidney Diseases & Hypertension  FOLLOW UP NOTE  [phone]--------------------------------------------------------------------------------      24 hour events/subjective: Patient seen and examined at bedside. Denies any complaints at this time.         PAST HISTORY  --------------------------------------------------------------------------------  No significant changes to PMH, PSH, FHx, SHx, unless otherwise noted    ALLERGIES & MEDICATIONS  --------------------------------------------------------------------------------  Allergies    No Known Allergies    Intolerances      Standing Inpatient Medications  dextrose 5%. 1000 milliLiter(s) IV Continuous <Continuous>  dextrose 50% Injectable 12.5 Gram(s) IV Push once  dextrose 50% Injectable 25 Gram(s) IV Push once  dextrose 50% Injectable 25 Gram(s) IV Push once  diphenoxylate/atropine 2 Tablet(s) Oral four times a day  Epclusa 400-100mg 1 Tablet(s) 1 Tablet(s) Oral at bedtime  gabapentin 300 milliGRAM(s) Oral daily  insulin lispro (HumaLOG) corrective regimen sliding scale   SubCutaneous three times a day before meals  insulin lispro (HumaLOG) corrective regimen sliding scale   SubCutaneous at bedtime  mycophenolate mofetil 500 milliGRAM(s) Oral two times a day  nystatin    Suspension [id number] Unit(s) Oral four times a day  predniSONE   Tablet 5 milliGRAM(s) Oral daily  tacrolimus 3.5 milliGRAM(s) Oral two times a day  tamsulosin 0.4 milliGRAM(s) Oral at bedtime  trimethoprim   80 mG/sulfamethoxazole 400 mG 1 Tablet(s) Oral daily    PRN Inpatient Medications  acetaminophen   Tablet. 650 milliGRAM(s) Oral every 6 hours PRN  dextrose 40% Gel 15 Gram(s) Oral once PRN  glucagon  Injectable 1 milliGRAM(s) IntraMuscular once PRN  morphine  - Injectable 2 milliGRAM(s) IV Push every 4 hours PRN      REVIEW OF SYSTEMS  --------------------------------------------------------------------------------  Gen: No  fevers/chills  Skin: No rashes  Head/Eyes/Ears/Mouth: No headache; Normal hearing; Normal vision w/o blurriness  Respiratory: No dyspnea, cough, wheezing, hemoptysis  CV: No chest pain, PND, orthopnea  GI: No abdominal pain, diarrhea, constipation, nausea, vomiting  : No increased frequency, dysuria, hematuria, nocturia  MSK: No joint pain/swelling; no back pain; no edema  Neuro: No dizziness/lightheadedness, weakness, seizures, numbness, tingling      All other systems were reviewed and are negative, except as noted.    VITALS/PHYSICAL EXAM  --------------------------------------------------------------------------------  T(C): 37 (08-12-18 @ 09:41), Max: 37.1 (08-11-18 @ 21:09)  HR: 70 (08-12-18 @ 09:41) (70 - 81)  BP: 130/70 (08-12-18 @ 09:41) (130/70 - 158/81)  RR: 16 (08-12-18 @ 09:41) (16 - 18)  SpO2: 99% (08-12-18 @ 09:41) (98% - 100%)  Wt(kg): --        08-11-18 @ 07:01  -  08-12-18 @ 07:00  --------------------------------------------------------  IN: 970 mL / OUT: 1300 mL / NET: -330 mL    08-12-18 @ 07:01  -  08-12-18 @ 12:41  --------------------------------------------------------  IN: 120 mL / OUT: 280 mL / NET: -160 mL      Physical Exam:  	  Gen: NAD,    	HEENT: no jvp  	Pulm: CTA B/L  	CV: RRR, S1S2; no rub  	Back:   no sacral edema  	Abd: +BS, soft,    	: No suprapubic tenderness  	Ext: no edema  	Neuro: awake  	Psych: alert  	Skin: Warm,    	Vascular access: Left UE AVF    LABS/STUDIES  --------------------------------------------------------------------------------              8.2    2.4   >-----------<  248      [08-12-18 @ 06:00]              24.7     130  |  97  |  72  ----------------------------<  107      [08-12-18 @ 06:00]  4.2   |  19  |  12.01        Ca     8.6     [08-12-18 @ 06:00]      Mg     1.9     [08-12-18 @ 06:00]      Phos  5.7     [08-12-18 @ 06:00]            Creatinine Trend:  SCr 12.01 [08-12 @ 06:00]  SCr 11.72 [08-11 @ 07:18]  SCr 11.84 [08-10 @ 06:55]  SCr 9.69 [08-09 @ 07:07]  SCr 10.92 [08-08 @ 06:26]

## 2018-08-12 NOTE — PROGRESS NOTE ADULT - ATTENDING COMMENTS
Good UOP however poor solute clearance.    Biopsy with ATN.    HD today    Immuno: tac goal 8-10, cellcept 1gm BID, pred 5 Good UOP however poor solute clearance.    Biopsy with ATN.    HD Monday    Immuno: tac goal 8-10, cellcept 1gm BID, pred 5

## 2018-08-13 ENCOUNTER — RESULT REVIEW (OUTPATIENT)
Age: 68
End: 2018-08-13

## 2018-08-13 LAB
ANION GAP SERPL CALC-SCNC: 17 MMOL/L — SIGNIFICANT CHANGE UP (ref 5–17)
BUN SERPL-MCNC: 79 MG/DL — HIGH (ref 7–23)
CALCIUM SERPL-MCNC: 8.3 MG/DL — LOW (ref 8.4–10.5)
CHLORIDE SERPL-SCNC: 99 MMOL/L — SIGNIFICANT CHANGE UP (ref 96–108)
CO2 SERPL-SCNC: 18 MMOL/L — LOW (ref 22–31)
CREAT SERPL-MCNC: 12.79 MG/DL — HIGH (ref 0.5–1.3)
CULTURE RESULTS: SIGNIFICANT CHANGE UP
GLUCOSE BLDC GLUCOMTR-MCNC: 129 MG/DL — HIGH (ref 70–99)
GLUCOSE BLDC GLUCOMTR-MCNC: 131 MG/DL — HIGH (ref 70–99)
GLUCOSE BLDC GLUCOMTR-MCNC: 274 MG/DL — HIGH (ref 70–99)
GLUCOSE BLDC GLUCOMTR-MCNC: 89 MG/DL — SIGNIFICANT CHANGE UP (ref 70–99)
GLUCOSE SERPL-MCNC: 128 MG/DL — HIGH (ref 70–99)
HCT VFR BLD CALC: 24.3 % — LOW (ref 39–50)
HCT VFR BLD CALC: 28.3 % — LOW (ref 39–50)
HGB BLD-MCNC: 8.1 G/DL — LOW (ref 13–17)
HGB BLD-MCNC: 9.4 G/DL — LOW (ref 13–17)
MAGNESIUM SERPL-MCNC: 1.9 MG/DL — SIGNIFICANT CHANGE UP (ref 1.6–2.6)
MCHC RBC-ENTMCNC: 32.2 PG — SIGNIFICANT CHANGE UP (ref 27–34)
MCHC RBC-ENTMCNC: 32.5 PG — SIGNIFICANT CHANGE UP (ref 27–34)
MCHC RBC-ENTMCNC: 33.2 GM/DL — SIGNIFICANT CHANGE UP (ref 32–36)
MCHC RBC-ENTMCNC: 33.3 GM/DL — SIGNIFICANT CHANGE UP (ref 32–36)
MCV RBC AUTO: 97 FL — SIGNIFICANT CHANGE UP (ref 80–100)
MCV RBC AUTO: 97.6 FL — SIGNIFICANT CHANGE UP (ref 80–100)
PHOSPHATE SERPL-MCNC: 6.5 MG/DL — HIGH (ref 2.5–4.5)
PLATELET # BLD AUTO: 261 K/UL — SIGNIFICANT CHANGE UP (ref 150–400)
PLATELET # BLD AUTO: 297 K/UL — SIGNIFICANT CHANGE UP (ref 150–400)
POTASSIUM SERPL-MCNC: 4.4 MMOL/L — SIGNIFICANT CHANGE UP (ref 3.5–5.3)
POTASSIUM SERPL-SCNC: 4.4 MMOL/L — SIGNIFICANT CHANGE UP (ref 3.5–5.3)
RBC # BLD: 2.49 M/UL — LOW (ref 4.2–5.8)
RBC # BLD: 2.92 M/UL — LOW (ref 4.2–5.8)
RBC # FLD: 17 % — HIGH (ref 10.3–14.5)
RBC # FLD: 17.1 % — HIGH (ref 10.3–14.5)
SODIUM SERPL-SCNC: 134 MMOL/L — LOW (ref 135–145)
SPECIMEN SOURCE: SIGNIFICANT CHANGE UP
TACROLIMUS SERPL-MCNC: 6.5 NG/ML — SIGNIFICANT CHANGE UP
WBC # BLD: 1.9 K/UL — LOW (ref 3.8–10.5)
WBC # BLD: 4.9 K/UL — SIGNIFICANT CHANGE UP (ref 3.8–10.5)
WBC # FLD AUTO: 1.9 K/UL — LOW (ref 3.8–10.5)
WBC # FLD AUTO: 4.9 K/UL — SIGNIFICANT CHANGE UP (ref 3.8–10.5)

## 2018-08-13 PROCEDURE — 88342 IMHCHEM/IMCYTCHM 1ST ANTB: CPT | Mod: 26

## 2018-08-13 PROCEDURE — 88346 IMFLUOR 1ST 1ANTB STAIN PX: CPT | Mod: 26

## 2018-08-13 PROCEDURE — 99233 SBSQ HOSP IP/OBS HIGH 50: CPT | Mod: GC,24

## 2018-08-13 PROCEDURE — 88313 SPECIAL STAINS GROUP 2: CPT | Mod: 26

## 2018-08-13 PROCEDURE — 88305 TISSUE EXAM BY PATHOLOGIST: CPT | Mod: 26

## 2018-08-13 PROCEDURE — 99232 SBSQ HOSP IP/OBS MODERATE 35: CPT | Mod: GC

## 2018-08-13 PROCEDURE — 76942 ECHO GUIDE FOR BIOPSY: CPT | Mod: 26

## 2018-08-13 PROCEDURE — 88312 SPECIAL STAINS GROUP 1: CPT | Mod: 26

## 2018-08-13 PROCEDURE — 88350 IMFLUOR EA ADDL 1ANTB STN PX: CPT | Mod: 26

## 2018-08-13 PROCEDURE — 88348 ELECTRON MICROSCOPY DX: CPT | Mod: 26

## 2018-08-13 PROCEDURE — 50200 RENAL BIOPSY PERQ: CPT | Mod: RT

## 2018-08-13 RX ORDER — FILGRASTIM 480MCG/1.6
300 VIAL (ML) INJECTION ONCE
Qty: 0 | Refills: 0 | Status: COMPLETED | OUTPATIENT
Start: 2018-08-13 | End: 2018-08-13

## 2018-08-13 RX ORDER — ERYTHROPOIETIN 10000 [IU]/ML
20000 INJECTION, SOLUTION INTRAVENOUS; SUBCUTANEOUS ONCE
Qty: 0 | Refills: 0 | Status: DISCONTINUED | OUTPATIENT
Start: 2018-08-13 | End: 2018-08-13

## 2018-08-13 RX ORDER — ERYTHROPOIETIN 10000 [IU]/ML
20000 INJECTION, SOLUTION INTRAVENOUS; SUBCUTANEOUS ONCE
Qty: 0 | Refills: 0 | Status: COMPLETED | OUTPATIENT
Start: 2018-08-13 | End: 2018-08-13

## 2018-08-13 RX ADMIN — Medication 2 TABLET(S): at 11:45

## 2018-08-13 RX ADMIN — Medication 500000 UNIT(S): at 22:16

## 2018-08-13 RX ADMIN — TACROLIMUS 4 MILLIGRAM(S): 5 CAPSULE ORAL at 06:00

## 2018-08-13 RX ADMIN — MYCOPHENOLATE MOFETIL 500 MILLIGRAM(S): 250 CAPSULE ORAL at 06:00

## 2018-08-13 RX ADMIN — Medication 300 MICROGRAM(S): at 16:53

## 2018-08-13 RX ADMIN — GABAPENTIN 300 MILLIGRAM(S): 400 CAPSULE ORAL at 11:45

## 2018-08-13 RX ADMIN — ERYTHROPOIETIN 20000 UNIT(S): 10000 INJECTION, SOLUTION INTRAVENOUS; SUBCUTANEOUS at 15:26

## 2018-08-13 RX ADMIN — Medication 500000 UNIT(S): at 06:00

## 2018-08-13 RX ADMIN — Medication 2 TABLET(S): at 06:00

## 2018-08-13 RX ADMIN — TAMSULOSIN HYDROCHLORIDE 0.4 MILLIGRAM(S): 0.4 CAPSULE ORAL at 22:15

## 2018-08-13 RX ADMIN — TACROLIMUS 4 MILLIGRAM(S): 5 CAPSULE ORAL at 22:15

## 2018-08-13 RX ADMIN — Medication 2 TABLET(S): at 22:15

## 2018-08-13 RX ADMIN — Medication 1 TABLET(S): at 11:45

## 2018-08-13 RX ADMIN — Medication 5 MILLIGRAM(S): at 06:00

## 2018-08-13 NOTE — PROGRESS NOTE ADULT - SUBJECTIVE AND OBJECTIVE BOX
Interventional Radiology Brief- Operative Note    Procedure: Right transplant kidney biopsy	    Operators: Dr. Bess, Dr. Lopez.     Anesthesia (type): sedation by attending anesthesiologist     Contrast: none    EBL: minimal    Findings/Follow up Plan of Care: F/u biopsy results.     Specimens Removed: 18G core biopsy x 5 given to on-site cytopathology tech.     Implants: none	    Complications: none	    Condition/Disposition: to recovery unit x 1 hr then to floor if anesthesia requirements are met.     Please call Interventional Radiology x [phone] with any questions, concerns, or issues.

## 2018-08-13 NOTE — PROGRESS NOTE ADULT - SUBJECTIVE AND OBJECTIVE BOX
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  His post-operative course was complicated by delayed graft function requiring 2 sessions of HD and Lasix infusion.  His urine output improved, armenta was removed and he was transitioned to oral Lasix 80mg BID. Found to have evolving hematoma inferior to kidney transplant after biopsy, now stable, still with poor graft function. Renal biopsy revealing ATN        PAST MEDICAL & SURGICAL HISTORY:  ESRD (end stage renal disease) on dialysis  Kidney cancer, primary, with metastasis from kidney to other site: with no mets  HTN (hypertension)  DM (diabetes mellitus)  S/p nephrectomy: right 12/10/2015  S/p nephrectomy: left 9/15/2015  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    Allergies: No Known Allergies      Current Medications: acetaminophen   Tablet. 650 milliGRAM(s) Oral every 6 hours PRN  dextrose 40% Gel 15 Gram(s) Oral once PRN  dextrose 5%. 1000 milliLiter(s) IV Continuous <Continuous>  dextrose 50% Injectable 12.5 Gram(s) IV Push once  dextrose 50% Injectable 25 Gram(s) IV Push once  dextrose 50% Injectable 25 Gram(s) IV Push once  diphenoxylate/atropine 2 Tablet(s) Oral four times a day  Epclusa 400-100mg 1 Tablet(s) 1 Tablet(s) Oral at bedtime  gabapentin 300 milliGRAM(s) Oral daily  glucagon  Injectable 1 milliGRAM(s) IntraMuscular once PRN  insulin lispro (HumaLOG) corrective regimen sliding scale   SubCutaneous three times a day before meals  insulin lispro (HumaLOG) corrective regimen sliding scale   SubCutaneous at bedtime  morphine  - Injectable 2 milliGRAM(s) IV Push every 4 hours PRN  nystatin    Suspension 011758 Unit(s) Oral four times a day  predniSONE   Tablet 5 milliGRAM(s) Oral daily  tacrolimus 4 milliGRAM(s) Oral two times a day  tamsulosin 0.4 milliGRAM(s) Oral at bedtime  trimethoprim   80 mG/sulfamethoxazole 400 mG 1 Tablet(s) Oral daily      Labs:                         8.1    1.9   )-----------( 261      ( 13 Aug 2018 06:06 )             24.3       08-13    134<L>  |  99  |  79<H>  ----------------------------<  128<H>  4.4   |  18<L>  |  12.79<H>    Ca    8.3<L>      13 Aug 2018 06:05  Phos  6.5     08-13  Mg     1.9     08-13      Assessment/Plan:   This is a 66yo Male  presents with poor graft function.   Patient presents to IR for renal biopsy.    Procedure/ risks/ benefits/ goals/ alternatives were explained. All questions answered. Informed content obtained from patient. Consent placed in chart. 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  His post-operative course was complicated by delayed graft function requiring 2 sessions of HD and Lasix infusion.  His urine output improved, armenta was removed and he was transitioned to oral Lasix 80mg BID. Found to have evolving hematoma inferior to kidney transplant, now stable, still with poor graft function. Renal biopsy revealing ATN        PAST MEDICAL & SURGICAL HISTORY:  ESRD (end stage renal disease) on dialysis  Kidney cancer, primary, with metastasis from kidney to other site: with no mets  HTN (hypertension)  DM (diabetes mellitus)  S/p nephrectomy: right 12/10/2015  S/p nephrectomy: left 9/15/2015  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    Allergies: No Known Allergies      Current Medications: acetaminophen   Tablet. 650 milliGRAM(s) Oral every 6 hours PRN  dextrose 40% Gel 15 Gram(s) Oral once PRN  dextrose 5%. 1000 milliLiter(s) IV Continuous <Continuous>  dextrose 50% Injectable 12.5 Gram(s) IV Push once  dextrose 50% Injectable 25 Gram(s) IV Push once  dextrose 50% Injectable 25 Gram(s) IV Push once  diphenoxylate/atropine 2 Tablet(s) Oral four times a day  Epclusa 400-100mg 1 Tablet(s) 1 Tablet(s) Oral at bedtime  gabapentin 300 milliGRAM(s) Oral daily  glucagon  Injectable 1 milliGRAM(s) IntraMuscular once PRN  insulin lispro (HumaLOG) corrective regimen sliding scale   SubCutaneous three times a day before meals  insulin lispro (HumaLOG) corrective regimen sliding scale   SubCutaneous at bedtime  morphine  - Injectable 2 milliGRAM(s) IV Push every 4 hours PRN  nystatin    Suspension 936947 Unit(s) Oral four times a day  predniSONE   Tablet 5 milliGRAM(s) Oral daily  tacrolimus 4 milliGRAM(s) Oral two times a day  tamsulosin 0.4 milliGRAM(s) Oral at bedtime  trimethoprim   80 mG/sulfamethoxazole 400 mG 1 Tablet(s) Oral daily      Labs:                         8.1    1.9   )-----------( 261      ( 13 Aug 2018 06:06 )             24.3       08-13    134<L>  |  99  |  79<H>  ----------------------------<  128<H>  4.4   |  18<L>  |  12.79<H>    Ca    8.3<L>      13 Aug 2018 06:05  Phos  6.5     08-13  Mg     1.9     08-13      Assessment/Plan:   This is a 68yo Male  presents with poor graft function.   Patient presents to IR for renal biopsy.    Procedure/ risks/ benefits/ goals/ alternatives were explained. All questions answered. Informed content obtained from patient. Consent placed in chart.

## 2018-08-13 NOTE — PROGRESS NOTE ADULT - PROBLEM SELECTOR PLAN 1
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  Renal biopsy with ATN.  -Creatinine elevated. Plan for HD today and discharge home.  -Pt euvolemic, no UF with HD.  -continue immunosuppression (Tacr4 mg q12h) and pred 5  -cellcept and valcyte held for leukopenia.

## 2018-08-13 NOTE — CHART NOTE - NSCHARTNOTEFT_GEN_A_CORE
Patient seen for nutrition follow-up on 6 Surjit  68 yo male with PMH of HTN, DM, ESRD S/P HCV+ DDRT on (7/17/18) with delayed graft function on home HD now admitted with drop in H/H. Found with evolving hematoma inferior to kidney transplant and now S/P renal transplant biopsy 8/9 revealing ATN. Per chart, plan for HD today and ?possible d/c home    Source: Patient [x]    Family [ ]     other [x- medical record; RN]    Diet : Consistent CHO+Snack Diet  Per discussion with RN, atient with episode of emesis overnight, reporting nausea following medication administration.  Confirmed episode with patient, states he spit up after swallowing some of the "swish & spit". Reports fair appetite & PO intakes but reports loose BMs remain. Per chart, BM x4 noted on 8/12. Reviewed BRAT diet with emphasis on consumption of bland, binding foods (i.e. rice, applesauce, toast, saltines, white bread, etc.). Pt verbalized understanding and amenable to trying today. Reviewed slightly elevated phosphorous level, pt states he typically takes phosphate binders at home but has not been taking since admission. Provided brief review of high phosphorous containing foods (i.e. beans, nuts, dairy, chocolate, etc.); however, pt states he has not been consuming these during this admission. Denied further diet education      Current Weight: (8/10) 223.7 pounds  Weight Change: variable weights ranging from 223.7 to 231.4 pounds- likely in the setting of fluid shifts    Pertinent Medications: MEDICATIONS  (STANDING):  dextrose 5%. 1000 milliLiter(s) (50 mL/Hr) IV Continuous <Continuous>  dextrose 50% Injectable 12.5 Gram(s) IV Push once  dextrose 50% Injectable 25 Gram(s) IV Push once  dextrose 50% Injectable 25 Gram(s) IV Push once  diphenoxylate/atropine 2 Tablet(s) Oral four times a day  Epclusa 400-100mg 1 Tablet(s) 1 Tablet(s) Oral at bedtime  gabapentin 300 milliGRAM(s) Oral daily  insulin lispro (HumaLOG) corrective regimen sliding scale   SubCutaneous three times a day before meals  insulin lispro (HumaLOG) corrective regimen sliding scale   SubCutaneous at bedtime  mycophenolate mofetil 500 milliGRAM(s) Oral two times a day  nystatin    Suspension 468356 Unit(s) Oral four times a day  predniSONE   Tablet 5 milliGRAM(s) Oral daily  tacrolimus 4 milliGRAM(s) Oral two times a day  tamsulosin 0.4 milliGRAM(s) Oral at bedtime  trimethoprim   80 mG/sulfamethoxazole 400 mG 1 Tablet(s) Oral daily    MEDICATIONS  (PRN):  acetaminophen   Tablet. 650 milliGRAM(s) Oral every 6 hours PRN Mild Pain (1 - 3)  dextrose 40% Gel 15 Gram(s) Oral once PRN Blood Glucose LESS THAN 70 milliGRAM(s)/deciliter  glucagon  Injectable 1 milliGRAM(s) IntraMuscular once PRN Glucose LESS THAN 70 milligrams/deciliter  morphine  - Injectable 2 milliGRAM(s) IV Push every 4 hours PRN Moderate Pain (4 - 6)    Pertinent Labs:  08-13 Na134 mmol/L<L> Glu 128 mg/dL<H> K+ 4.4 mmol/L Cr  12.79 mg/dL<H> BUN 79 mg/dL<H> 08-13 Phos 6.5 mg/dL<H> 07-17 [id number] 6.0 %<H>      Skin: no edema; no pressure injuries    Estimated Needs:   [x] no change since previous assessment  [ ] recalculated:       Previous Nutrition Diagnosis:     [x] Increased Nutrient Needs          Nutrition Diagnosis is [x] ongoing         New Nutrition Diagnosis: [x] not applicable        Recommend    1) Recommend consistent CHO+snack, no concentrated phosphorous diet  2) Provide food preferences within dietary restrictions when feasible  3) Encourage good PO intake of meals  4) Suggest addition of phosphate binder as clinically indicated       Monitoring and Evaluation:     [x] PO intake [x] Tolerance to diet prescription [x] weights [x] follow up per protocol    [x] other: RD remains available, Judith Mcgee RD Pager #[phone]

## 2018-08-13 NOTE — PROGRESS NOTE ADULT - SUBJECTIVE AND OBJECTIVE BOX
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  DONOR 40M KDPI 30%. CIT 22 hours.  Simulect induction. His post-operative course was complicated by delayed graft function requiring TIW HD and PO Lasix 80mg BID.      Patient examined at bedside. Kidney biopsy revealed ATN, making good urine, stable fluid volume status this morning, but elevated BUN/Creat 79/12.79, K+4.4, WBC 1.9    Potential Discharge date 8/13    PLan: see assessment and plan of care     MEDICATIONS  (STANDING):  dextrose 5%. 1000 milliLiter(s) (50 mL/Hr) IV Continuous <Continuous>  dextrose 50% Injectable 12.5 Gram(s) IV Push once  dextrose 50% Injectable 25 Gram(s) IV Push once  dextrose 50% Injectable 25 Gram(s) IV Push once  diphenoxylate/atropine 2 Tablet(s) Oral four times a day  Epclusa 400-100mg 1 Tablet(s) 1 Tablet(s) Oral at bedtime  epoetin moi Injectable 37566 Unit(s) SubCutaneous once  filgrastim-sndz Injectable 300 MICROGram(s) SubCutaneous once  gabapentin 300 milliGRAM(s) Oral daily  insulin lispro (HumaLOG) corrective regimen sliding scale   SubCutaneous three times a day before meals  insulin lispro (HumaLOG) corrective regimen sliding scale   SubCutaneous at bedtime  nystatin    Suspension 060522 Unit(s) Oral four times a day  predniSONE   Tablet 5 milliGRAM(s) Oral daily  tacrolimus 4 milliGRAM(s) Oral two times a day  tamsulosin 0.4 milliGRAM(s) Oral at bedtime  trimethoprim   80 mG/sulfamethoxazole 400 mG 1 Tablet(s) Oral daily    MEDICATIONS  (PRN):  acetaminophen   Tablet. 650 milliGRAM(s) Oral every 6 hours PRN Mild Pain (1 - 3)  dextrose 40% Gel 15 Gram(s) Oral once PRN Blood Glucose LESS THAN 70 milliGRAM(s)/deciliter  glucagon  Injectable 1 milliGRAM(s) IntraMuscular once PRN Glucose LESS THAN 70 milligrams/deciliter  morphine  - Injectable 2 milliGRAM(s) IV Push every 4 hours PRN Moderate Pain (4 - 6)      PAST MEDICAL & SURGICAL HISTORY:  ESRD (end stage renal disease) on dialysis  Kidney cancer, primary, with metastasis from kidney to other site: with no mets  HTN (hypertension)  DM (diabetes mellitus)  S/p nephrectomy: right 12/10/2015  S/p nephrectomy: left 9/15/2015  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      Vital Signs Last 24 Hrs  T(C): 36.5 (13 Aug 2018 05:01), Max: 37.1 (12 Aug 2018 13:16)  T(F): 97.7 (13 Aug 2018 05:01), Max: 98.8 (12 Aug 2018 13:16)  HR: 78 (13 Aug 2018 05:01) (75 - 84)  BP: 142/75 (13 Aug 2018 05:01) (123/75 - 151/77)  BP(mean): --  RR: 18 (13 Aug 2018 05:01) (16 - 18)  SpO2: 98% (13 Aug 2018 05:01) (97% - 100%)    I&O's Summary    12 Aug 2018 07:01  -  13 Aug 2018 07:00  --------------------------------------------------------  IN: 1200 mL / OUT: 930 mL / NET: 270 mL                              8.1    1.9   )-----------( 261      ( 13 Aug 2018 06:06 )             24.3     08-13    134<L>  |  99  |  79<H>  ----------------------------<  128<H>  4.4   |  18<L>  |  12.79<H>    Ca    8.3<L>      13 Aug 2018 06:05  Phos  6.5     08-13  Mg     1.9     08-13      Tacrolimus (), Serum: 5.9 ng/mL (08-12 @ 06:37)    Review of systems  Gen: No weight changes, fatigue, fevers/chills, weakness  Skin: No rashes  Head/Eyes/Ears/Mouth: No headache; Normal hearing; Normal vision w/o blurriness; No sinus pain/discomfort, sore throat  Respiratory: No dyspnea, cough, wheezing, hemoptysis  CV: No chest pain, PND, orthopnea  GI: No abdominal pain, constipation, nausea, vomiting, melena, hematochezia. Reports some improvement and less frequency of watery stools  : No increased frequency, dysuria, nocturia.  reports pinkish colored urine  MSK: No joint pain/swelling; no back pain;  Neuro: No dizziness/lightheadedness, weakness, seizures, numbness, tingling  Heme: No easy bruising or bleeding  Endo: No heat/cold intolerance  Psych: No significant nervousness, anxiety, stress, depression  All other systems were reviewed and are negative, except as noted.    PHYSICAL EXAM:  Constitutional: Well developed / well nourished  Eyes: Anicteric, PERRLA  ENMT: nc/at  Neck: supple  Respiratory: CTA B/L  Cardiovascular: RRR  Gastrointestinal: Soft abdomen, ND, NT.  Genitourinary: Voiding spontaneously  Extremities: SCD's in place and working bilaterally. trace BLE edema  Vascular: Palpable dp pulses bilaterally  Neurological: A&O x3

## 2018-08-13 NOTE — PROGRESS NOTE ADULT - PROBLEM SELECTOR PLAN 1
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  Stable H/H  -8/9/2018 Renal biopsy indicating ATN  - Last HD session 8/8/18. Urine output improving, but still with elevated bun/creatinine  - BMP daily, Strict I/Os  - Diabetic, renal diet as tolerated  - SCDs, Ambulate as tolerated.  - C. Diff, GI PCR negative  - c/w lomotil for diarrhea - improving somewhat.  - plan for HD today and discharge to home with follow-up this Wednesday in transplant clinic

## 2018-08-13 NOTE — PROGRESS NOTE ADULT - PROBLEM SELECTOR PLAN 2
: -Continue nystatin, Bactrim, Tacrolimus,  -DC cellcept due to leukopenia  -Valcyte on hold 2/2 leukopenia  -Tacrolimus troughs daily, goal 8-10  - Continue Epclusa 2/2 HCV + donor.

## 2018-08-13 NOTE — PROGRESS NOTE ADULT - ATTENDING COMMENTS
Pt still with CHELA of transplant likely ATN from ischemia reperfusion injury +/- hematoma.    Biopsy prelim suggestive of ATN not rejection but was inadequate.  Will repeat biopsy.   Dialysis today before procedure.

## 2018-08-13 NOTE — PROGRESS NOTE ADULT - PROBLEM SELECTOR PLAN 2
Pt with stable hematoma on renal US, now s/p 3 U PRBC transfusion.  pt was using heparin in dialysate at home.   avoid heparin.  Hb 8.1, Epogen 20,000 U today.

## 2018-08-13 NOTE — PROGRESS NOTE ADULT - SUBJECTIVE AND OBJECTIVE BOX
[facility] DIVISION OF KIDNEY DISEASES AND HYPERTENSION -- FOLLOW UP NOTE  --------------------------------------------------------------------------------  Chief Complaint: DDRT    24 hour events/subjective:  reports diarrhea is less frequent.  no acute complaint.        PAST HISTORY  --------------------------------------------------------------------------------  No significant changes to PMH, PSH, FHx, SHx, unless otherwise noted    ALLERGIES & MEDICATIONS  --------------------------------------------------------------------------------  Allergies    No Known Allergies    Intolerances      Standing Inpatient Medications  dextrose 5%. 1000 milliLiter(s) IV Continuous <Continuous>  dextrose 50% Injectable 12.5 Gram(s) IV Push once  dextrose 50% Injectable 25 Gram(s) IV Push once  dextrose 50% Injectable 25 Gram(s) IV Push once  diphenoxylate/atropine 2 Tablet(s) Oral four times a day  Epclusa 400-100mg 1 Tablet(s) 1 Tablet(s) Oral at bedtime  epoetin moi Injectable 96678 Unit(s) SubCutaneous once  filgrastim-sndz Injectable 300 MICROGram(s) SubCutaneous once  gabapentin 300 milliGRAM(s) Oral daily  insulin lispro (HumaLOG) corrective regimen sliding scale   SubCutaneous three times a day before meals  insulin lispro (HumaLOG) corrective regimen sliding scale   SubCutaneous at bedtime  nystatin    Suspension 150714 Unit(s) Oral four times a day  predniSONE   Tablet 5 milliGRAM(s) Oral daily  tacrolimus 4 milliGRAM(s) Oral two times a day  tamsulosin 0.4 milliGRAM(s) Oral at bedtime  trimethoprim   80 mG/sulfamethoxazole 400 mG 1 Tablet(s) Oral daily    PRN Inpatient Medications  acetaminophen   Tablet. 650 milliGRAM(s) Oral every 6 hours PRN  dextrose 40% Gel 15 Gram(s) Oral once PRN  glucagon  Injectable 1 milliGRAM(s) IntraMuscular once PRN  morphine  - Injectable 2 milliGRAM(s) IV Push every 4 hours PRN      REVIEW OF SYSTEMS  --------------------------------------------------------------------------------  Gen: No weight changes, fatigue, fevers/chills, weakness  Skin: No rashes  Head/Eyes/Ears/Mouth: No headache; Normal hearing; Normal vision w/o blurriness;  Respiratory: No dyspnea, cough, wheezing, hemoptysis  CV: No chest pain, PND, orthopnea  GI: No abdominal pain,  constipation, nausea, vomiting, melena, hematochezia. diarrhea+  : No increased frequency, dysuria, hematuria, nocturia  MSK: No joint pain/swelling; no back pain; no edema  Neuro: No dizziness/lightheadedness, weakness, seizures, numbness, tingling    VITALS/PHYSICAL EXAM  --------------------------------------------------------------------------------  T(C): 36.5 (08-13-18 @ 05:01), Max: 37.1 (08-12-18 @ 13:16)  HR: 78 (08-13-18 @ 05:01) (70 - 84)  BP: 142/75 (08-13-18 @ 05:01) (123/75 - 151/77)  RR: 18 (08-13-18 @ 05:01) (16 - 18)  SpO2: 98% (08-13-18 @ 05:01) (97% - 100%)  Wt(kg): --        08-12-18 @ 07:01  -  08-13-18 @ 07:00  --------------------------------------------------------  IN: 1200 mL / OUT: 930 mL / NET: 270 mL      Physical Exam:  	Gen: NAD,    	Pulm: CTA B/L  	CV: RRR, S1S2; no rub  	Back:   no sacral edema  	Abd: +BS, soft,    	: No suprapubic tenderness  	Ext: no edema  	Skin: Warm,    	Vascular access: Left UE AVF      LABS/STUDIES  --------------------------------------------------------------------------------              8.1    1.9   >-----------<  261      [08-13-18 @ 06:06]              24.3     134  |  99  |  79  ----------------------------<  128      [08-13-18 @ 06:05]  4.4   |  18  |  12.79        Ca     8.3     [08-13-18 @ 06:05]      Mg     1.9     [08-13-18 @ 06:05]      Phos  6.5     [08-13-18 @ 06:05]            Creatinine Trend:  SCr 12.79 [08-13 @ 06:05]  SCr 12.01 [08-12 @ 06:00]  SCr 11.72 [08-11 @ 07:18]  SCr 11.84 [08-10 @ 06:55]  SCr 9.69 [08-09 @ 07:07]        HbA1c 6.0      [07-17-18 @ 16:53]    HBsAb 5.6      [07-18-18 @ 19:20]  HBsAg Nonreact      [07-18-18 @ 19:20]  HBcAb Nonreact      [07-18-18 @ 19:20]  HCV 0.27, Nonreact      [07-18-18 @ 19:20]

## 2018-08-14 LAB
ANION GAP SERPL CALC-SCNC: 16 MMOL/L — SIGNIFICANT CHANGE UP (ref 5–17)
BUN SERPL-MCNC: 56 MG/DL — HIGH (ref 7–23)
CALCIUM SERPL-MCNC: 8.6 MG/DL — SIGNIFICANT CHANGE UP (ref 8.4–10.5)
CHLORIDE SERPL-SCNC: 98 MMOL/L — SIGNIFICANT CHANGE UP (ref 96–108)
CO2 SERPL-SCNC: 22 MMOL/L — SIGNIFICANT CHANGE UP (ref 22–31)
CREAT SERPL-MCNC: 10.47 MG/DL — HIGH (ref 0.5–1.3)
GLUCOSE BLDC GLUCOMTR-MCNC: 137 MG/DL — HIGH (ref 70–99)
GLUCOSE BLDC GLUCOMTR-MCNC: 146 MG/DL — HIGH (ref 70–99)
GLUCOSE BLDC GLUCOMTR-MCNC: 222 MG/DL — HIGH (ref 70–99)
GLUCOSE BLDC GLUCOMTR-MCNC: 224 MG/DL — HIGH (ref 70–99)
GLUCOSE SERPL-MCNC: 119 MG/DL — HIGH (ref 70–99)
HCT VFR BLD CALC: 27 % — LOW (ref 39–50)
HGB BLD-MCNC: 9.1 G/DL — LOW (ref 13–17)
MAGNESIUM SERPL-MCNC: 1.8 MG/DL — SIGNIFICANT CHANGE UP (ref 1.6–2.6)
MCHC RBC-ENTMCNC: 32.4 PG — SIGNIFICANT CHANGE UP (ref 27–34)
MCHC RBC-ENTMCNC: 33.6 GM/DL — SIGNIFICANT CHANGE UP (ref 32–36)
MCV RBC AUTO: 96.4 FL — SIGNIFICANT CHANGE UP (ref 80–100)
PHOSPHATE SERPL-MCNC: 5.1 MG/DL — HIGH (ref 2.5–4.5)
PLATELET # BLD AUTO: 280 K/UL — SIGNIFICANT CHANGE UP (ref 150–400)
POTASSIUM SERPL-MCNC: 4.1 MMOL/L — SIGNIFICANT CHANGE UP (ref 3.5–5.3)
POTASSIUM SERPL-SCNC: 4.1 MMOL/L — SIGNIFICANT CHANGE UP (ref 3.5–5.3)
RBC # BLD: 2.8 M/UL — LOW (ref 4.2–5.8)
RBC # FLD: 17.1 % — HIGH (ref 10.3–14.5)
SODIUM SERPL-SCNC: 136 MMOL/L — SIGNIFICANT CHANGE UP (ref 135–145)
TACROLIMUS SERPL-MCNC: 10 NG/ML — SIGNIFICANT CHANGE UP
WBC # BLD: 9.4 K/UL — SIGNIFICANT CHANGE UP (ref 3.8–10.5)
WBC # FLD AUTO: 9.4 K/UL — SIGNIFICANT CHANGE UP (ref 3.8–10.5)

## 2018-08-14 PROCEDURE — 99232 SBSQ HOSP IP/OBS MODERATE 35: CPT | Mod: GC

## 2018-08-14 PROCEDURE — 99233 SBSQ HOSP IP/OBS HIGH 50: CPT | Mod: GC,24

## 2018-08-14 RX ORDER — NIFEDIPINE 30 MG
30 TABLET, EXTENDED RELEASE 24 HR ORAL DAILY
Qty: 0 | Refills: 0 | Status: DISCONTINUED | OUTPATIENT
Start: 2018-08-14 | End: 2018-08-15

## 2018-08-14 RX ADMIN — Medication 2 TABLET(S): at 06:28

## 2018-08-14 RX ADMIN — TAMSULOSIN HYDROCHLORIDE 0.4 MILLIGRAM(S): 0.4 CAPSULE ORAL at 20:25

## 2018-08-14 RX ADMIN — Medication 2 TABLET(S): at 14:27

## 2018-08-14 RX ADMIN — Medication 2: at 17:00

## 2018-08-14 RX ADMIN — Medication 30 MILLIGRAM(S): at 14:27

## 2018-08-14 RX ADMIN — TACROLIMUS 4 MILLIGRAM(S): 5 CAPSULE ORAL at 18:08

## 2018-08-14 RX ADMIN — Medication 500000 UNIT(S): at 14:27

## 2018-08-14 RX ADMIN — Medication 500000 UNIT(S): at 18:09

## 2018-08-14 RX ADMIN — Medication 100 MILLIGRAM(S): at 16:09

## 2018-08-14 RX ADMIN — Medication 500000 UNIT(S): at 06:29

## 2018-08-14 RX ADMIN — Medication 2 TABLET(S): at 18:08

## 2018-08-14 RX ADMIN — Medication 1 TABLET(S): at 18:47

## 2018-08-14 RX ADMIN — TACROLIMUS 4 MILLIGRAM(S): 5 CAPSULE ORAL at 06:28

## 2018-08-14 RX ADMIN — Medication 5 MILLIGRAM(S): at 06:28

## 2018-08-14 RX ADMIN — Medication 500000 UNIT(S): at 23:04

## 2018-08-14 RX ADMIN — GABAPENTIN 300 MILLIGRAM(S): 400 CAPSULE ORAL at 14:27

## 2018-08-14 RX ADMIN — Medication 2 TABLET(S): at 23:04

## 2018-08-14 NOTE — PROGRESS NOTE ADULT - SUBJECTIVE AND OBJECTIVE BOX
INTERVAL HPI/OVERNIGHT EVENTS:  HCV + DDRT 7/17/2018     Patient seen with multidisciplinary team (Nephrologist, pharmacist, nurse, nurse manager, NP, MD surgeons, transplant coordinator,  nutritionist, and  )  in am rounds and examined with Dr. Heath. 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  DONOR 40M KDPI 30%. CIT 22 hours.  Simulect induction. His post-operative course was complicated by delayed graft function requiring TIW HD and PO Lasix 80mg BID.   Kidney biopsy revealed ATN, making good urine, stable fluid volume status this morning, but elevated BUN/Creat 79/12.79, K+4.4, WBC 1.9, s/p repeat kidney biopsy yesterday, no acute events overnight.    Potential Discharge date 8/13    PLan: see assessment and plan of care       MEDICATIONS  (STANDING):  dextrose 5%. 1000 milliLiter(s) (50 mL/Hr) IV Continuous <Continuous>  dextrose 50% Injectable 12.5 Gram(s) IV Push once  dextrose 50% Injectable 25 Gram(s) IV Push once  dextrose 50% Injectable 25 Gram(s) IV Push once  diphenoxylate/atropine 2 Tablet(s) Oral four times a day  Epclusa 400-100mg 1 Tablet(s) 1 Tablet(s) Oral at bedtime  gabapentin 300 milliGRAM(s) Oral daily  insulin lispro (HumaLOG) corrective regimen sliding scale   SubCutaneous three times a day before meals  insulin lispro (HumaLOG) corrective regimen sliding scale   SubCutaneous at bedtime  NIFEdipine XL 30 milliGRAM(s) Oral daily  nystatin    Suspension 241936 Unit(s) Oral four times a day  predniSONE   Tablet 5 milliGRAM(s) Oral daily  tacrolimus 4 milliGRAM(s) Oral two times a day  tamsulosin 0.4 milliGRAM(s) Oral at bedtime  trimethoprim   80 mG/sulfamethoxazole 400 mG 1 Tablet(s) Oral daily    MEDICATIONS  (PRN):  acetaminophen   Tablet. 650 milliGRAM(s) Oral every 6 hours PRN Mild Pain (1 - 3)  dextrose 40% Gel 15 Gram(s) Oral once PRN Blood Glucose LESS THAN 70 milliGRAM(s)/deciliter  glucagon  Injectable 1 milliGRAM(s) IntraMuscular once PRN Glucose LESS THAN 70 milligrams/deciliter  morphine  - Injectable 2 milliGRAM(s) IV Push every 4 hours PRN Moderate Pain (4 - 6)      Allergies    No Known Allergies    Intolerances        Vital Signs Last 24 Hrs  T(C): 37.1 (14 Aug 2018 11:25), Max: 37.2 (14 Aug 2018 06:14)  T(F): 98.7 (14 Aug 2018 11:25), Max: 99 (14 Aug 2018 06:14)  HR: 92 (14 Aug 2018 11:25) (72 - 99)  BP: 137/80 (14 Aug 2018 11:25) (137/80 - 169/90)  BP(mean): --  RR: 18 (14 Aug 2018 11:25) (18 - 18)  SpO2: 99% (14 Aug 2018 11:25) (95% - 100%)    LABS:                        9.4    4.9   )-----------( 297      ( 13 Aug 2018 22:24 )             28.3     08-14    136  |  98  |  56<H>  ----------------------------<  119<H>  4.1   |  22  |  10.47<H>    Ca    8.6      14 Aug 2018 07:05  Phos  5.1     08-14  Mg     1.8     08-14      RADIOLOGY & ADDITIONAL TESTS:     Review of systems  Gen: No weight changes, fatigue, fevers/chills, weakness  Skin: No rashes  Head/Eyes/Ears/Mouth: No headache; Normal hearing; Normal vision w/o blurriness; No sinus pain/discomfort, sore throat  Respiratory: No dyspnea, cough, wheezing, hemoptysis  CV: No chest pain, PND, orthopnea  GI: No abdominal pain, constipation, nausea, vomiting, melena, hematochezia. Reports some improvement and less frequency of watery stools  : No increased frequency, dysuria, nocturia.  reports pinkish colored urine  MSK: No joint pain/swelling; no back pain;  Neuro: No dizziness/lightheadedness, weakness, seizures, numbness, tingling  Heme: No easy bruising or bleeding  Endo: No heat/cold intolerance  Psych: No significant nervousness, anxiety, stress, depression  All other systems were reviewed and are negative, except as noted.    PHYSICAL EXAM:  Constitutional: Well developed / well nourished  Eyes: Anicteric, PERRLA  ENMT: nc/at  Neck: supple  Respiratory: CTA B/L  Cardiovascular: RRR  Gastrointestinal: Soft abdomen, ND, NT.  Genitourinary: Voiding spontaneously  Extremities: SCD's in place and working bilaterally. trace BLE edema  Vascular: Palpable dp pulses bilaterally  Neurological: A&O x3

## 2018-08-14 NOTE — PROGRESS NOTE ADULT - NSHPATTENDINGPLANDISCUSS_GEN_ALL_CORE
HD unit
transplant surg
transplant surgery team
House staff, Transplant team
patient, transplant team and nursing team
transplant surgery team
patient, transplant team and nursing team
transplant surgery team

## 2018-08-14 NOTE — PROGRESS NOTE ADULT - PROBLEM SELECTOR PLAN 1
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  Stable H/H  -8/9/2018 Renal biopsy indicating ATN  8/13-Renal biopsy done f/u results and plan  On HD. Urine output improving, but still with elevated bun/creatinine  - BMP daily, Strict I/Os  - Diabetic, renal diet as tolerated  - SCDs, Ambulate as tolerated.  - C. Diff, GI PCR negative  - c/w lomotil for diarrhea - improving somewhat.  - plan for HD today and discharge to home with follow-up  in transplant clinic.

## 2018-08-14 NOTE — PROGRESS NOTE ADULT - SUBJECTIVE AND OBJECTIVE BOX
[facility] DIVISION OF KIDNEY DISEASES AND HYPERTENSION -- FOLLOW UP NOTE  --------------------------------------------------------------------------------  Chief Complaint: renal transplant recipient    24 hour events/subjective:  pt s/p renal biopsy yesterday. received 2 hours of HD.  Feeling nauseous this morning, had an episode of vomiting this AM.        PAST HISTORY  --------------------------------------------------------------------------------  No significant changes to PMH, PSH, FHx, SHx, unless otherwise noted    ALLERGIES & MEDICATIONS  --------------------------------------------------------------------------------  Allergies    No Known Allergies    Intolerances      Standing Inpatient Medications  dextrose 5%. 1000 milliLiter(s) IV Continuous <Continuous>  dextrose 50% Injectable 12.5 Gram(s) IV Push once  dextrose 50% Injectable 25 Gram(s) IV Push once  dextrose 50% Injectable 25 Gram(s) IV Push once  diphenoxylate/atropine 2 Tablet(s) Oral four times a day  Epclusa 400-100mg 1 Tablet(s) 1 Tablet(s) Oral at bedtime  gabapentin 300 milliGRAM(s) Oral daily  insulin lispro (HumaLOG) corrective regimen sliding scale   SubCutaneous three times a day before meals  insulin lispro (HumaLOG) corrective regimen sliding scale   SubCutaneous at bedtime  NIFEdipine XL 30 milliGRAM(s) Oral daily  nystatin    Suspension 381102 Unit(s) Oral four times a day  predniSONE   Tablet 5 milliGRAM(s) Oral daily  tacrolimus 4 milliGRAM(s) Oral two times a day  tamsulosin 0.4 milliGRAM(s) Oral at bedtime  trimethoprim   80 mG/sulfamethoxazole 400 mG 1 Tablet(s) Oral daily    PRN Inpatient Medications  acetaminophen   Tablet. 650 milliGRAM(s) Oral every 6 hours PRN  dextrose 40% Gel 15 Gram(s) Oral once PRN  glucagon  Injectable 1 milliGRAM(s) IntraMuscular once PRN  morphine  - Injectable 2 milliGRAM(s) IV Push every 4 hours PRN      REVIEW OF SYSTEMS  --------------------------------------------------------------------------------  Gen: No weight changes, fatigue, fevers/chills, weakness  Skin: No rashes  Head/Eyes/Ears/Mouth: No headache; Normal hearing; Normal vision w/o blurriness; No sinus pain/discomfort, sore throat  Respiratory: No dyspnea, cough, wheezing, hemoptysis  CV: No chest pain, PND, orthopnea  GI: No abdominal pain, diarrhea, constipation.  nausea, vomiting+  : No increased frequency, dysuria, hematuria, nocturia  MSK: No joint pain/swelling; no back pain; no edema  Neuro: No dizziness/lightheadedness, weakness, seizures, numbness, tingling  Heme: No easy bruising or bleeding  Endo: No heat/cold intolerance  Psych: No significant nervousness, anxiety, stress, depression    All other systems were reviewed and are negative, except as noted.    VITALS/PHYSICAL EXAM  --------------------------------------------------------------------------------  T(C): 37.1 (08-14-18 @ 11:25), Max: 37.2 (08-14-18 @ 06:14)  HR: 92 (08-14-18 @ 11:25) (72 - 99)  BP: 137/80 (08-14-18 @ 11:25) (137/80 - 169/90)  RR: 18 (08-14-18 @ 11:25) (18 - 18)  SpO2: 99% (08-14-18 @ 11:25) (95% - 100%)  Wt(kg): --        08-13-18 @ 07:01  -  08-14-18 @ 07:00  --------------------------------------------------------  IN: 480 mL / OUT: 750 mL / NET: -270 mL      Physical Exam:  	Gen: NAD,    	Pulm: CTA B/L  	CV: RRR, S1S2; no rub  	Back:   no sacral edema  	Abd: +BS, soft,    	: No suprapubic tenderness  	Ext: no edema  	Skin: Warm,    	Vascular access: Left UE AVF  LABS/STUDIES  --------------------------------------------------------------------------------              9.4    4.9   >-----------<  297      [08-13-18 @ 22:24]              28.3     136  |  98  |  56  ----------------------------<  119      [08-14-18 @ 07:05]  4.1   |  22  |  10.47        Ca     8.6     [08-14-18 @ 07:05]      Mg     1.8     [08-14-18 @ 07:05]      Phos  5.1     [08-14-18 @ 07:05]            Creatinine Trend:  SCr 10.47 [08-14 @ 07:05]  SCr 12.79 [08-13 @ 06:05]  SCr 12.01 [08-12 @ 06:00]  SCr 11.72 [08-11 @ 07:18]  SCr 11.84 [08-10 @ 06:55]        HbA1c 6.0      [07-17-18 @ 16:53]    HBsAb 5.6      [07-18-18 @ 19:20]  HBsAg Nonreact      [07-18-18 @ 19:20]  HBcAb Nonreact      [07-18-18 @ 19:20]  HCV 0.27, Nonreact      [07-18-18 @ 19:20]

## 2018-08-14 NOTE — PROGRESS NOTE ADULT - PROBLEM SELECTOR PLAN 1
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  Renal biopsy with ATN. Repeat biopsy pending.  -Scr elevated, pt with nausea/vomiting.  Schedule for HD for clearance.  -continue immunosuppression (Tacr4 mg q12h) and pred 5  -cellcept and valcyte held for leukopenia.

## 2018-08-14 NOTE — PROGRESS NOTE ADULT - PROBLEM SELECTOR PLAN 2
Continue nystatin, Bactrim, Tacrolimus,  -DC cellcept due to leukopenia  -Valcyte on hold 2/2 leukopenia  -Tacrolimus troughs daily, goal 8-10  - Continue Epclusa 2/2 HCV + donor.

## 2018-08-14 NOTE — PROGRESS NOTE ADULT - ATTENDING COMMENTS
Pt with repeat biopsy yesterday  Reveals borderline rejection and calcium oxalate crystals.  Will treat with solumedrol - 250mgs IV today  Check 24 hour urine for oxalate.  If elevated will add calcium with meals, low oxalate diet and B6  Will perform HD today as incomplete yesterday and pt vomiting.

## 2018-08-14 NOTE — PROGRESS NOTE ADULT - ATTENDING COMMENTS
dialyzed yesterday.  repeat biopsy performed.  shows oxalate crystals and boderline ACR.     Will start solumedrol taper.    Immuno: tac goal 8-10, cellcept 1gm BID, steroid taper

## 2018-08-15 ENCOUNTER — APPOINTMENT (OUTPATIENT)
Dept: NEPHROLOGY | Facility: CLINIC | Age: 68
End: 2018-08-15

## 2018-08-15 VITALS
SYSTOLIC BLOOD PRESSURE: 162 MMHG | HEART RATE: 96 BPM | RESPIRATION RATE: 20 BRPM | TEMPERATURE: 98 F | DIASTOLIC BLOOD PRESSURE: 88 MMHG | OXYGEN SATURATION: 97 %

## 2018-08-15 DIAGNOSIS — I10 ESSENTIAL (PRIMARY) HYPERTENSION: ICD-10-CM

## 2018-08-15 LAB
ANION GAP SERPL CALC-SCNC: 16 MMOL/L — SIGNIFICANT CHANGE UP (ref 5–17)
BUN SERPL-MCNC: 50 MG/DL — HIGH (ref 7–23)
CALCIUM SERPL-MCNC: 8.8 MG/DL — SIGNIFICANT CHANGE UP (ref 8.4–10.5)
CHLORIDE SERPL-SCNC: 96 MMOL/L — SIGNIFICANT CHANGE UP (ref 96–108)
CO2 SERPL-SCNC: 23 MMOL/L — SIGNIFICANT CHANGE UP (ref 22–31)
CREAT ?TM UR-MCNC: 136 MG/DL — SIGNIFICANT CHANGE UP
CREAT SERPL-MCNC: 9.1 MG/DL — HIGH (ref 0.5–1.3)
GLUCOSE BLDC GLUCOMTR-MCNC: 232 MG/DL — HIGH (ref 70–99)
GLUCOSE SERPL-MCNC: 216 MG/DL — HIGH (ref 70–99)
HCT VFR BLD CALC: 27.7 % — LOW (ref 39–50)
HGB BLD-MCNC: 9.2 G/DL — LOW (ref 13–17)
HIV 1+2 AB+HIV1 P24 AG SERPL QL IA: SIGNIFICANT CHANGE UP
MAGNESIUM SERPL-MCNC: 1.7 MG/DL — SIGNIFICANT CHANGE UP (ref 1.6–2.6)
MCHC RBC-ENTMCNC: 32.5 PG — SIGNIFICANT CHANGE UP (ref 27–34)
MCHC RBC-ENTMCNC: 33.3 GM/DL — SIGNIFICANT CHANGE UP (ref 32–36)
MCV RBC AUTO: 97.7 FL — SIGNIFICANT CHANGE UP (ref 80–100)
PHOSPHATE SERPL-MCNC: 5 MG/DL — HIGH (ref 2.5–4.5)
PLATELET # BLD AUTO: 271 K/UL — SIGNIFICANT CHANGE UP (ref 150–400)
POTASSIUM SERPL-MCNC: 4.7 MMOL/L — SIGNIFICANT CHANGE UP (ref 3.5–5.3)
POTASSIUM SERPL-SCNC: 4.7 MMOL/L — SIGNIFICANT CHANGE UP (ref 3.5–5.3)
RBC # BLD: 2.84 M/UL — LOW (ref 4.2–5.8)
RBC # FLD: 17.1 % — HIGH (ref 10.3–14.5)
SODIUM SERPL-SCNC: 135 MMOL/L — SIGNIFICANT CHANGE UP (ref 135–145)
TACROLIMUS SERPL-MCNC: 47.6 NG/ML — SIGNIFICANT CHANGE UP
WBC # BLD: 11.6 K/UL — HIGH (ref 3.8–10.5)
WBC # FLD AUTO: 11.6 K/UL — HIGH (ref 3.8–10.5)

## 2018-08-15 PROCEDURE — 80048 BASIC METABOLIC PNL TOTAL CA: CPT

## 2018-08-15 PROCEDURE — 76942 ECHO GUIDE FOR BIOPSY: CPT

## 2018-08-15 PROCEDURE — 87522 HEPATITIS C REVRS TRNSCRPJ: CPT

## 2018-08-15 PROCEDURE — 88312 SPECIAL STAINS GROUP 1: CPT

## 2018-08-15 PROCEDURE — 87449 NOS EACH ORGANISM AG IA: CPT

## 2018-08-15 PROCEDURE — 88342 IMHCHEM/IMCYTCHM 1ST ANTB: CPT

## 2018-08-15 PROCEDURE — 76775 US EXAM ABDO BACK WALL LIM: CPT

## 2018-08-15 PROCEDURE — 87536 HIV-1 QUANT&REVRSE TRNSCRPJ: CPT

## 2018-08-15 PROCEDURE — 86850 RBC ANTIBODY SCREEN: CPT

## 2018-08-15 PROCEDURE — 71045 X-RAY EXAM CHEST 1 VIEW: CPT

## 2018-08-15 PROCEDURE — 84132 ASSAY OF SERUM POTASSIUM: CPT

## 2018-08-15 PROCEDURE — 87324 CLOSTRIDIUM AG IA: CPT

## 2018-08-15 PROCEDURE — 83880 ASSAY OF NATRIURETIC PEPTIDE: CPT

## 2018-08-15 PROCEDURE — 86900 BLOOD TYPING SEROLOGIC ABO: CPT

## 2018-08-15 PROCEDURE — 84484 ASSAY OF TROPONIN QUANT: CPT

## 2018-08-15 PROCEDURE — 88350 IMFLUOR EA ADDL 1ANTB STN PX: CPT

## 2018-08-15 PROCEDURE — 50200 RENAL BIOPSY PERQ: CPT

## 2018-08-15 PROCEDURE — 87340 HEPATITIS B SURFACE AG IA: CPT

## 2018-08-15 PROCEDURE — 87046 STOOL CULTR AEROBIC BACT EA: CPT

## 2018-08-15 PROCEDURE — 80197 ASSAY OF TACROLIMUS: CPT

## 2018-08-15 PROCEDURE — 88346 IMFLUOR 1ST 1ANTB STAIN PX: CPT

## 2018-08-15 PROCEDURE — 93005 ELECTROCARDIOGRAM TRACING: CPT

## 2018-08-15 PROCEDURE — 36430 TRANSFUSION BLD/BLD COMPNT: CPT

## 2018-08-15 PROCEDURE — 80053 COMPREHEN METABOLIC PANEL: CPT

## 2018-08-15 PROCEDURE — 82947 ASSAY GLUCOSE BLOOD QUANT: CPT

## 2018-08-15 PROCEDURE — C1894: CPT

## 2018-08-15 PROCEDURE — 82803 BLOOD GASES ANY COMBINATION: CPT

## 2018-08-15 PROCEDURE — 99285 EMERGENCY DEPT VISIT HI MDM: CPT | Mod: 25

## 2018-08-15 PROCEDURE — 93971 EXTREMITY STUDY: CPT

## 2018-08-15 PROCEDURE — 88313 SPECIAL STAINS GROUP 2: CPT

## 2018-08-15 PROCEDURE — 86923 COMPATIBILITY TEST ELECTRIC: CPT

## 2018-08-15 PROCEDURE — 85027 COMPLETE CBC AUTOMATED: CPT

## 2018-08-15 PROCEDURE — 87389 HIV-1 AG W/HIV-1&-2 AB AG IA: CPT

## 2018-08-15 PROCEDURE — 83605 ASSAY OF LACTIC ACID: CPT

## 2018-08-15 PROCEDURE — 85610 PROTHROMBIN TIME: CPT

## 2018-08-15 PROCEDURE — 88348 ELECTRON MICROSCOPY DX: CPT

## 2018-08-15 PROCEDURE — 84295 ASSAY OF SERUM SODIUM: CPT

## 2018-08-15 PROCEDURE — 82962 GLUCOSE BLOOD TEST: CPT

## 2018-08-15 PROCEDURE — 87517 HEPATITIS B DNA QUANT: CPT

## 2018-08-15 PROCEDURE — 76776 US EXAM K TRANSPL W/DOPPLER: CPT | Mod: 26,RT

## 2018-08-15 PROCEDURE — 87507 IADNA-DNA/RNA PROBE TQ 12-25: CPT

## 2018-08-15 PROCEDURE — 85730 THROMBOPLASTIN TIME PARTIAL: CPT

## 2018-08-15 PROCEDURE — 83735 ASSAY OF MAGNESIUM: CPT

## 2018-08-15 PROCEDURE — 82570 ASSAY OF URINE CREATININE: CPT

## 2018-08-15 PROCEDURE — 82435 ASSAY OF BLOOD CHLORIDE: CPT

## 2018-08-15 PROCEDURE — 86901 BLOOD TYPING SEROLOGIC RH(D): CPT

## 2018-08-15 PROCEDURE — 99261: CPT

## 2018-08-15 PROCEDURE — 82330 ASSAY OF CALCIUM: CPT

## 2018-08-15 PROCEDURE — 83945 ASSAY OF OXALATE: CPT

## 2018-08-15 PROCEDURE — 86803 HEPATITIS C AB TEST: CPT

## 2018-08-15 PROCEDURE — 88305 TISSUE EXAM BY PATHOLOGIST: CPT

## 2018-08-15 PROCEDURE — 87177 OVA AND PARASITES SMEARS: CPT

## 2018-08-15 PROCEDURE — 87045 FECES CULTURE AEROBIC BACT: CPT

## 2018-08-15 PROCEDURE — P9016: CPT

## 2018-08-15 PROCEDURE — 99232 SBSQ HOSP IP/OBS MODERATE 35: CPT | Mod: GC

## 2018-08-15 PROCEDURE — 99239 HOSP IP/OBS DSCHRG MGMT >30: CPT

## 2018-08-15 PROCEDURE — 76776 US EXAM K TRANSPL W/DOPPLER: CPT

## 2018-08-15 PROCEDURE — 85014 HEMATOCRIT: CPT

## 2018-08-15 PROCEDURE — 84100 ASSAY OF PHOSPHORUS: CPT

## 2018-08-15 RX ORDER — NIFEDIPINE 30 MG
1 TABLET, EXTENDED RELEASE 24 HR ORAL
Qty: 30 | Refills: 0
Start: 2018-08-15 | End: 2018-09-13

## 2018-08-15 RX ORDER — TACROLIMUS 5 MG/1
5 CAPSULE ORAL
Qty: 0 | Refills: 0 | COMMUNITY
Start: 2018-08-15

## 2018-08-15 RX ORDER — VALGANCICLOVIR 450 MG/1
450 TABLET, FILM COATED ORAL
Qty: 0 | Refills: 0 | Status: DISCONTINUED | OUTPATIENT
Start: 2018-08-15 | End: 2018-08-15

## 2018-08-15 RX ORDER — MAGNESIUM SULFATE 500 MG/ML
2 VIAL (ML) INJECTION ONCE
Qty: 0 | Refills: 0 | Status: COMPLETED | OUTPATIENT
Start: 2018-08-15 | End: 2018-08-15

## 2018-08-15 RX ORDER — TACROLIMUS 5 MG/1
4 CAPSULE ORAL
Qty: 0 | Refills: 0 | COMMUNITY
Start: 2018-08-15

## 2018-08-15 RX ORDER — DIPHENOXYLATE HCL/ATROPINE 2.5-.025MG
2 TABLET ORAL
Qty: 60 | Refills: 0 | OUTPATIENT
Start: 2018-08-15 | End: 2018-08-24

## 2018-08-15 RX ORDER — DIPHENOXYLATE HCL/ATROPINE 2.5-.025MG
1 TABLET ORAL THREE TIMES A DAY
Qty: 0 | Refills: 0 | Status: DISCONTINUED | OUTPATIENT
Start: 2018-08-15 | End: 2018-08-15

## 2018-08-15 RX ORDER — DIPHENOXYLATE HCL/ATROPINE 2.5-.025MG
2 TABLET ORAL
Qty: 0 | Refills: 0 | COMMUNITY
Start: 2018-08-15

## 2018-08-15 RX ORDER — DIPHENOXYLATE HCL/ATROPINE 2.5-.025MG
2 TABLET ORAL THREE TIMES A DAY
Qty: 0 | Refills: 0 | Status: DISCONTINUED | OUTPATIENT
Start: 2018-08-15 | End: 2018-08-15

## 2018-08-15 RX ORDER — INSULIN LISPRO 100/ML
0 VIAL (ML) SUBCUTANEOUS
Qty: 0 | Refills: 0 | COMMUNITY
Start: 2018-08-15

## 2018-08-15 RX ORDER — VALGANCICLOVIR 450 MG/1
1 TABLET, FILM COATED ORAL
Qty: 0 | Refills: 0 | DISCHARGE
Start: 2018-08-15

## 2018-08-15 RX ADMIN — Medication 500000 UNIT(S): at 12:30

## 2018-08-15 RX ADMIN — Medication 50 GRAM(S): at 10:42

## 2018-08-15 RX ADMIN — Medication 2: at 08:38

## 2018-08-15 RX ADMIN — TACROLIMUS 4 MILLIGRAM(S): 5 CAPSULE ORAL at 05:21

## 2018-08-15 RX ADMIN — GABAPENTIN 300 MILLIGRAM(S): 400 CAPSULE ORAL at 12:30

## 2018-08-15 RX ADMIN — Medication 30 MILLIGRAM(S): at 05:20

## 2018-08-15 RX ADMIN — Medication 125 MILLIGRAM(S): at 05:24

## 2018-08-15 RX ADMIN — VALGANCICLOVIR 450 MILLIGRAM(S): 450 TABLET, FILM COATED ORAL at 12:30

## 2018-08-15 RX ADMIN — Medication 1 TABLET(S): at 12:30

## 2018-08-15 NOTE — PROGRESS NOTE ADULT - PROBLEM SELECTOR PLAN 2
Continue nystatin, Bactrim, Tacrolimus, on pulse steroid with taper  -DC cellcept due to leukopenia  -Valcyte on hold 2/2 leukopenia  -Tacrolimus troughs daily, goal 8-10  - Continue Epclusa 2/2 HCV + donor.

## 2018-08-15 NOTE — PROGRESS NOTE ADULT - PROBLEM SELECTOR PROBLEM 2
Anemia
Immunosuppression

## 2018-08-15 NOTE — PROGRESS NOTE ADULT - PROBLEM SELECTOR PLAN 1
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  Renal biopsy with ATN. Repeat biopsy prelim results consistent with borderline cell mediated ejection. S/p pulse steroids; to be maintained on pred 20 daily.  Pt to be discharged with follow up scheduled on Friday.  Instructed to hold HD and monitor UOP to monitor for renal recovery.  -continue immunosuppression (Tacr4 mg q12h)  -restart valcyte (HD dosing)  - Cellcept on hold

## 2018-08-15 NOTE — PROGRESS NOTE ADULT - SUBJECTIVE AND OBJECTIVE BOX
[facility] DIVISION OF KIDNEY DISEASES AND HYPERTENSION -- FOLLOW UP NOTE  --------------------------------------------------------------------------------  Chief Complaint: DDRT (07/17/18)    24 hour events/subjective:  s/p HD yesterday.  diarrhea has resolved.        PAST HISTORY  --------------------------------------------------------------------------------  No significant changes to PMH, PSH, FHx, SHx, unless otherwise noted    ALLERGIES & MEDICATIONS  --------------------------------------------------------------------------------  Allergies    No Known Allergies    Intolerances      Standing Inpatient Medications  dextrose 5%. 1000 milliLiter(s) IV Continuous <Continuous>  dextrose 50% Injectable 12.5 Gram(s) IV Push once  dextrose 50% Injectable 25 Gram(s) IV Push once  dextrose 50% Injectable 25 Gram(s) IV Push once  diphenoxylate/atropine 2 Tablet(s) Oral three times a day  Epclusa 400-100mg 1 Tablet(s) 1 Tablet(s) Oral at bedtime  gabapentin 300 milliGRAM(s) Oral daily  insulin lispro (HumaLOG) corrective regimen sliding scale   SubCutaneous three times a day before meals  insulin lispro (HumaLOG) corrective regimen sliding scale   SubCutaneous at bedtime  magnesium sulfate  IVPB 2 Gram(s) IV Intermittent once  NIFEdipine XL 30 milliGRAM(s) Oral daily  nystatin    Suspension 765709 Unit(s) Oral four times a day  tacrolimus 4 milliGRAM(s) Oral two times a day  tamsulosin 0.4 milliGRAM(s) Oral at bedtime  trimethoprim   80 mG/sulfamethoxazole 400 mG 1 Tablet(s) Oral daily  valGANciclovir 450 milliGRAM(s) Oral <User Schedule>    PRN Inpatient Medications  acetaminophen   Tablet. 650 milliGRAM(s) Oral every 6 hours PRN  dextrose 40% Gel 15 Gram(s) Oral once PRN  glucagon  Injectable 1 milliGRAM(s) IntraMuscular once PRN      REVIEW OF SYSTEMS  --------------------------------------------------------------------------------  Gen: No weight changes, fatigue, fevers/chills, weakness  Skin: No rashes  Head/Eyes/Ears/Mouth: No headache; Normal hearing; Normal vision w/o blurriness; No sinus pain/discomfort, sore throat  Respiratory: No dyspnea, cough, wheezing, hemoptysis  CV: No chest pain, PND, orthopnea  GI: No abdominal pain, diarrhea, constipation, nausea, vomiting, melena, hematochezia  : No increased frequency, dysuria, hematuria, nocturia  MSK: No joint pain/swelling; no back pain; no edema  Neuro: No dizziness/lightheadedness, weakness, seizures, numbness, tingling  Heme: No easy bruising or bleeding  Endo: No heat/cold intolerance  Psych: No significant nervousness, anxiety, stress, depression    All other systems were reviewed and are negative, except as noted.    VITALS/PHYSICAL EXAM  --------------------------------------------------------------------------------  T(C): 36.8 (08-15-18 @ 05:11), Max: 37.1 (08-14-18 @ 11:25)  HR: 82 (08-15-18 @ 05:11) (82 - 97)  BP: 160/81 (08-15-18 @ 05:11) (126/70 - 161/70)  RR: 20 (08-15-18 @ 05:11) (18 - 20)  SpO2: 98% (08-15-18 @ 05:11) (97% - 99%)  Wt(kg): --        08-14-18 @ 07:01  -  08-15-18 @ 07:00  --------------------------------------------------------  IN: 1920 mL / OUT: 1400 mL / NET: 520 mL      Physical Exam:              Gen: NAD  	Pulm: CTA B/L  	CV: RRR, S1S2; no rub  	Back:   no sacral edema  	Abd: +BS, soft,    	: No suprapubic tenderness  	Ext: no edema  	Skin: Warm,    	Vascular access: Left UE AVF	    LABS/STUDIES  --------------------------------------------------------------------------------              9.2    11.6  >-----------<  271      [08-15-18 @ 06:57]              27.7     135  |  96  |  50  ----------------------------<  216      [08-15-18 @ 06:57]  4.7   |  23  |  9.10        Ca     8.8     [08-15-18 @ 06:57]      Mg     1.7     [08-15-18 @ 06:57]      Phos  5.0     [08-15-18 @ 06:57]            Creatinine Trend:  SCr 9.10 [08-15 @ 06:57]  SCr 10.47 [08-14 @ 07:05]  SCr 12.79 [08-13 @ 06:05]  SCr 12.01 [08-12 @ 06:00]  SCr 11.72 [08-11 @ 07:18]        HbA1c 6.0      [07-17-18 @ 16:53]    HBsAb 5.6      [07-18-18 @ 19:20]  HBsAg Nonreact      [07-18-18 @ 19:20]  HBcAb Nonreact      [07-18-18 @ 19:20]  HCV 0.27, Nonreact      [07-18-18 @ 19:20]

## 2018-08-15 NOTE — PROGRESS NOTE ADULT - ATTENDING COMMENTS
Pt with repeat biopsy that reveals borderline rejection and calcium oxalate crystals.  Will treat with solumedrol - 125 today and d/c home on 20mgs BID starting tomorrow and then 20mgs daily.    check serum oxalate, urine oxalate creatinine ratio.    check renal sono  d/c home and f/u in 2 days.  Pt instructed not to stat dialysis at home.

## 2018-08-15 NOTE — PROGRESS NOTE ADULT - SUBJECTIVE AND OBJECTIVE BOX
Learner: Patient  Barriers: None    Method used: Verbal discussion and written material    Medication safety was discussed with the patient: allergies, herbals, adherence, interactions, medication precautions, miss dose instructions, purpose, side effects, signs and symptoms to report, and storage & handling    Patient was able to repeat and verbalize key points    Education Summary: Discharge immunosuppressant medications and prophylatic anti-infective agents reviewed with the patient. Outpatient medication schedule was discussed in detail including: medication name, indication, dose, administration times, treatment duration, side effects, drug interactions, and special instructions. Patient questions and concerns were answered and addressed. Patient demonstrated understanding.    Time spent discharge education: 30 minutes    Renal Transplant medications:  Tacrolimus adjusted to trough   Prednisone taper to 20 mg daily  Sulfamethoxazole/Trimethoprim 1SS daily  Nystatin swish and swallow four times a day  Valganciclovir 450 mg Mon Wed Fri  Pepcid 20 mg daily  Other meds: Lomotil 2 tabs TID, Epclusa daily, Januvia 25 mg daily, gabapentin 300 mg daily, flomax 0.4 mg daily    CellCept on hold and will follow up in clinic.

## 2018-08-15 NOTE — PROGRESS NOTE ADULT - ASSESSMENT
67 year old male s/p HCV + donor DDRT on 7/17/2018 with DGF on TIW HD at home who presented with hemoglobin to 6.7 found to have evolving hematoma inferior to kidney transplant likely related to use of heparin in dialysate at home, now stable, still with no improvement in graft function for biopsy today
67 year old male s/p HCV + donor DDRT on 7/17/2018 with DGF on TIW HD at home who presented with hemoglobin to 6.7 found to have evolving hematoma inferior to kidney transplant likely related to use of heparin in dialysate at home, now stable, still with poor graft function s/p renal biopsy revealing ATN.
67 year old male s/p HCV + donor DDRT on 7/17/2018 with DGF on TIW HD at home who presented with hemoglobin to 6.7 found to have evolving hematoma inferior to kidney transplant likely related to use of heparin in dialysate at home, now stable, still with poor graft function s/p renal biopsy revealing ATN. S/P second biopsy 8/13/18 with mild rejection started onpulse steroid yesterday. Discharge plan in progress for today.
67 year old male s/p HCV + donor DDRT on 7/17/2018 with DGF on TIW HD at home who presented with hemoglobin to 6.7 found to have evolving hematoma inferior to kidney transplant likely related to use of heparin in dialysate at home, now stable, still with poor graft function s/p renal biopsy revealing ATN. S/P second biopsy yesterday  decision awaits for treatment with pulse steroid pending result of biopsy. Discharge plan in progress.
67 year old male s/p HCV + donor DDRT on 7/17/2018 with DGF on TIW HD at home who presented with hemoglobin to 6.7 found to have evolving hematoma inferior to kidney transplant likely related to use of heparin in dialysate at home, now stable.
67 year old male s/p HCV + donor DDRT on 7/17/2018 with DGF on TIW HD at home who presented with hemoglobin to 6.7 now with findings of evolving hematoma inferior to kidney transplant
67 year old male s/p HCV + donor DDRT on 7/17/2018 with DGF on TIW HD at home who presented with hemoglobin to 6.7 now with findings of evolving hematoma inferior to kidney transplant, U/S on 8/3 showed no change in hematoma size, now with leukopenia likely related to valcyte.
67 year old male s/p HCV + donor DDRT on 7/17/2018 with DGF on TIW HD at home who presented with hemoglobin to 6.7 now with findings of evolving hematoma inferior to kidney transplant, U/S on 8/3 showed no change in hematoma size.
67 year old male s/p HCV + donor DDRT on 7/17/2018 with DGF on TIW HD at home who presented with hemoglobin to 6.7 now with findings of evolving hematoma inferior to kidney transplant, U/S on 8/3 showed no change in hematoma size.
67 year old male s/p HCV + donor DDRT on 7/17/2018 with DGF on TIW HD at home who presented with hemoglobin to 6.7.
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 (HEP C + DONOR) with CIT of 23 hours done on 7/17/18. Induction with Solumedrol and Simulect went  to f/u with Transplant Nephrologist and was found to have Anemia. He does home hemodialysis.
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 (HEP C + DONOR) with CIT of 23 hours done on 7/17/18. Induction with Solumedrol and Simulect went  to f/u with Transplant Nephrologist and was found to have Anemia. On home hemodialysis.
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 (HEP C + DONOR) with CIT of 23 hours done on 7/17/18. Induction with Solumedrol and Simulect went  to f/u with Transplant Nephrologist and was found to have Anemia. Pt is on home hemodialysis.
Mr. Gay is a 67 year old male 15 days s/p DDRT (R) now with evolving hematoma adjacent the transplant. Patient is currently asymptomatic, hemodynamically stable and has responded to blood transfusion appropriately. Diarrhea unlikely to be infectious in nature at this juncture, however given his immunosuppressed state and antibiotic use will continue to monitor closely for both dehydration and possibly bacterial etiology.
67 year old male s/p HCV + donor DDRT on 7/17/2018 with DGF on TIW HD at home who presented with hemoglobin to 6.7 found to have evolving hematoma inferior to kidney transplant likely related to use of heparin in dialysate at home, now stable, still with poor graft function s/p renal biopsy revealing ATN.
67 year old male s/p HCV + donor DDRT on 7/17/2018 with DGF on TIW HD at home who presented with hemoglobin to 6.7 now with findings of evolving hematoma inferior to kidney transplant, U/S on 8/3 showed no change in hematoma size.

## 2018-08-15 NOTE — PROGRESS NOTE ADULT - PROBLEM SELECTOR PLAN 1
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  Stable H/H  -8/9/2018 Renal biopsy indicating ATN  8/13-Renal biopsy with mild rejection started on pulse steroid yesterday  On HD. Urine output improving, but still with elevated bun/creatinine  - BMP daily, Strict I/Os  - Diabetic, renal diet as tolerated  - SCDs, Ambulate as tolerated.  - C. Diff, GI PCR negative  - c/w lomotil for diarrhea - improving.  - S/Pr HD yesterday and discharge to home with follow-up  in transplant clinic.

## 2018-08-15 NOTE — PROGRESS NOTE ADULT - PROBLEM SELECTOR PROBLEM 3
DM (diabetes mellitus)
Diarrhea
Diarrhea
HTN (hypertension)
DM (diabetes mellitus)

## 2018-08-15 NOTE — PROGRESS NOTE ADULT - PROBLEM SELECTOR PROBLEM 1
Kidney transplanted

## 2018-08-15 NOTE — PROGRESS NOTE ADULT - PROVIDER SPECIALTY LIST ADULT
Intervent Radiology
Nephrology
Pharmacy
Surgery
Surgery
Transplant Medicine
Transplant Surgery

## 2018-08-15 NOTE — PROGRESS NOTE ADULT - SUBJECTIVE AND OBJECTIVE BOX
INTERVAL HPI/OVERNIGHT EVENTS:  Patient seen with multidisciplinary team (Nephrologist, pharmacist, nurse, nurse manager, NP, MD surgeons, transplant coordinator,  nutritionist, and  )  in am rounds and examined with Dr. Heath. 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  DONOR 40M KDPI 30%. CIT 22 hours.  Simulect induction. His post-operative course was complicated by delayed graft function requiring TIW HD and PO Lasix 80mg BID.   Kidney biopsy revealed ATN, making good urine, stable fluid volume status this morning,  s/p repeat kidney biopsy 8/13 with mild rejection started on pulse steroid therapy yesterday, no acute events overnight.    Potential Discharge date 8/13    PLan: see assessment and plan of care   MEDICATIONS  (STANDING):  dextrose 5%. 1000 milliLiter(s) (50 mL/Hr) IV Continuous <Continuous>  dextrose 50% Injectable 12.5 Gram(s) IV Push once  dextrose 50% Injectable 25 Gram(s) IV Push once  dextrose 50% Injectable 25 Gram(s) IV Push once  diphenoxylate/atropine 2 Tablet(s) Oral three times a day  Epclusa 400-100mg 1 Tablet(s) 1 Tablet(s) Oral at bedtime  gabapentin 300 milliGRAM(s) Oral daily  insulin lispro (HumaLOG) corrective regimen sliding scale   SubCutaneous three times a day before meals  insulin lispro (HumaLOG) corrective regimen sliding scale   SubCutaneous at bedtime  magnesium sulfate  IVPB 2 Gram(s) IV Intermittent once  NIFEdipine XL 30 milliGRAM(s) Oral daily  nystatin    Suspension 069124 Unit(s) Oral four times a day  tacrolimus 4 milliGRAM(s) Oral two times a day  tamsulosin 0.4 milliGRAM(s) Oral at bedtime  trimethoprim   80 mG/sulfamethoxazole 400 mG 1 Tablet(s) Oral daily  valGANciclovir 450 milliGRAM(s) Oral <User Schedule>    MEDICATIONS  (PRN):  acetaminophen   Tablet. 650 milliGRAM(s) Oral every 6 hours PRN Mild Pain (1 - 3)  dextrose 40% Gel 15 Gram(s) Oral once PRN Blood Glucose LESS THAN 70 milliGRAM(s)/deciliter  glucagon  Injectable 1 milliGRAM(s) IntraMuscular once PRN Glucose LESS THAN 70 milligrams/deciliter      Allergies    No Known Allergies    Intolerances        Vital Signs Last 24 Hrs  T(C): 36.8 (15 Aug 2018 05:11), Max: 37.1 (14 Aug 2018 11:25)  T(F): 98.3 (15 Aug 2018 05:11), Max: 98.8 (14 Aug 2018 21:05)  HR: 82 (15 Aug 2018 05:11) (82 - 97)  BP: 160/81 (15 Aug 2018 05:11) (126/70 - 161/70)  BP(mean): --  RR: 20 (15 Aug 2018 05:11) (18 - 20)  SpO2: 98% (15 Aug 2018 05:11) (97% - 99%)    LABS:                        9.2    11.6  )-----------( 271      ( 15 Aug 2018 06:57 )             27.7     08-15    135  |  96  |  50<H>  ----------------------------<  216<H>  4.7   |  23  |  9.10<H>    Ca    8.8      15 Aug 2018 06:57  Phos  5.0     08-15  Mg     1.7     08-15            RADIOLOGY & ADDITIONAL TESTS:     Review of systems  Gen: No weight changes, fatigue, fevers/chills, weakness  Skin: No rashes  Head/Eyes/Ears/Mouth: No headache; Normal hearing; Normal vision w/o blurriness; No sinus pain/discomfort, sore throat  Respiratory: No dyspnea, cough, wheezing, hemoptysis  CV: No chest pain, PND, orthopnea  GI: No abdominal pain, constipation, nausea, vomiting, melena, hematochezia. Reports some improvement and less frequency of watery stools  : No increased frequency, dysuria, nocturia.  reports pinkish colored urine  MSK: No joint pain/swelling; no back pain;  Neuro: No dizziness/lightheadedness, weakness, seizures, numbness, tingling  Heme: No easy bruising or bleeding  Endo: No heat/cold intolerance  Psych: No significant nervousness, anxiety, stress, depression  All other systems were reviewed and are negative, except as noted.    PHYSICAL EXAM:  Constitutional: Well developed / well nourished  Eyes: Anicteric, PERRLA  ENMT: nc/at  Neck: supple  Respiratory: CTA B/L  Cardiovascular: RRR  Gastrointestinal: Soft abdomen, ND, NT.  Genitourinary: Voiding spontaneously  Extremities: SCD's in place and working bilaterally. trace BLE edema,  LUE AVF with + T&B  Vascular: Palpable dp pulses bilaterally  Neurological: A&O x3

## 2018-08-16 LAB
HBV DNA # SERPL NAA+PROBE: SIGNIFICANT CHANGE UP IU/ML
HBV DNA SERPL NAA+PROBE-LOG#: SIGNIFICANT CHANGE UP LOGIU/ML
HBV SURFACE AG SER-ACNC: SIGNIFICANT CHANGE UP
HCV AB S/CO SERPL IA: 0.1 S/CO — SIGNIFICANT CHANGE UP
HCV AB SERPL-IMP: SIGNIFICANT CHANGE UP
HCV RNA SERPL NAA DL=5-ACNC: 19 IU/ML — HIGH
HCV RNA SPEC NAA+PROBE-LOG IU: 1.28 LOGIU/ML — HIGH
HIV-1 VIRAL LOAD RESULT: SIGNIFICANT CHANGE UP
HIV1 RNA # SERPL NAA+PROBE: SIGNIFICANT CHANGE UP
HIV1 RNA SER-IMP: SIGNIFICANT CHANGE UP
HIV1 RNA SERPL NAA+PROBE-ACNC: SIGNIFICANT CHANGE UP
HIV1 RNA SERPL NAA+PROBE-LOG#: SIGNIFICANT CHANGE UP LG COP/ML
OXALATE RANDOM URINE CREATININE: 87.6 MG/DL — SIGNIFICANT CHANGE UP (ref 20–370)
OXALATE RANDOM URINE: 29 MG/G CREAT — SIGNIFICANT CHANGE UP (ref 3–30)

## 2018-08-17 ENCOUNTER — APPOINTMENT (OUTPATIENT)
Dept: TRANSPLANT | Facility: CLINIC | Age: 68
End: 2018-08-17
Payer: MEDICARE

## 2018-08-17 ENCOUNTER — LABORATORY RESULT (OUTPATIENT)
Age: 68
End: 2018-08-17

## 2018-08-17 VITALS
RESPIRATION RATE: 14 BRPM | BODY MASS INDEX: 32.78 KG/M2 | HEIGHT: 70 IN | SYSTOLIC BLOOD PRESSURE: 134 MMHG | HEART RATE: 83 BPM | OXYGEN SATURATION: 97 % | WEIGHT: 229 LBS | TEMPERATURE: 98.7 F | DIASTOLIC BLOOD PRESSURE: 73 MMHG

## 2018-08-17 LAB
ALBUMIN SERPL ELPH-MCNC: 3.8 G/DL
ALP BLD-CCNC: 84 U/L
ALT SERPL-CCNC: 9 U/L
ANION GAP SERPL CALC-SCNC: 20 MMOL/L
APPEARANCE: ABNORMAL
AST SERPL-CCNC: 15 U/L
BACTERIA: NEGATIVE
BASOPHILS # BLD AUTO: 0 K/UL
BASOPHILS NFR BLD AUTO: 0 %
BILIRUB SERPL-MCNC: 0.2 MG/DL
BILIRUBIN URINE: NEGATIVE
BLOOD URINE: ABNORMAL
BUN SERPL-MCNC: 72 MG/DL
CALCIUM SERPL-MCNC: 8.9 MG/DL
CHLORIDE SERPL-SCNC: 96 MMOL/L
CO2 SERPL-SCNC: 21 MMOL/L
COLOR: YELLOW
CREAT SERPL-MCNC: 10.5 MG/DL
CREAT SPEC-SCNC: 106 MG/DL
CREAT/PROT UR: 0.8 RATIO
EOSINOPHIL # BLD AUTO: 0 K/UL
EOSINOPHIL NFR BLD AUTO: 0 %
GLUCOSE QUALITATIVE U: 250 MG/DL
GLUCOSE SERPL-MCNC: 251 MG/DL
HCT VFR BLD CALC: 27.9 %
HGB BLD-MCNC: 8.7 G/DL
HYALINE CASTS: 2 /LPF
IMM GRANULOCYTES NFR BLD AUTO: 0.6 %
KETONES URINE: NEGATIVE
LDH SERPL-CCNC: 292 U/L
LEUKOCYTE ESTERASE URINE: ABNORMAL
LYMPHOCYTES # BLD AUTO: 0.27 K/UL
LYMPHOCYTES NFR BLD AUTO: 3.5 %
MAGNESIUM SERPL-MCNC: 1.8 MG/DL
MAN DIFF?: NORMAL
MCHC RBC-ENTMCNC: 30.7 PG
MCHC RBC-ENTMCNC: 31.2 GM/DL
MCV RBC AUTO: 98.6 FL
MICROSCOPIC-UA: NORMAL
MONOCYTES # BLD AUTO: 0.28 K/UL
MONOCYTES NFR BLD AUTO: 3.6 %
NEUTROPHILS # BLD AUTO: 7.13 K/UL
NEUTROPHILS NFR BLD AUTO: 92.3 %
NITRITE URINE: NEGATIVE
PH URINE: 6.5
PHOSPHATE SERPL-MCNC: 5 MG/DL
PLATELET # BLD AUTO: 272 K/UL
POTASSIUM SERPL-SCNC: 5.6 MMOL/L
PROT SERPL-MCNC: 6.5 G/DL
PROT UR-MCNC: 85 MG/DL
PROTEIN URINE: 100 MG/DL
RBC # BLD: 2.83 M/UL
RBC # FLD: 18.4 %
RED BLOOD CELLS URINE: 292 /HPF
SODIUM SERPL-SCNC: 137 MMOL/L
SPECIFIC GRAVITY URINE: 1.01
SQUAMOUS EPITHELIAL CELLS: 3 /HPF
TACROLIMUS SERPL-MCNC: 9.5 NG/ML
URATE SERPL-MCNC: 8.8 MG/DL
UROBILINOGEN URINE: NEGATIVE MG/DL
WBC # FLD AUTO: 7.73 K/UL
WHITE BLOOD CELLS URINE: 69 /HPF

## 2018-08-17 PROCEDURE — 99213 OFFICE O/P EST LOW 20 MIN: CPT

## 2018-08-17 RX ORDER — TACROLIMUS 0.5 MG/1
0.5 CAPSULE ORAL
Qty: 180 | Refills: 3 | Status: DISCONTINUED | COMMUNITY
Start: 2018-07-19 | End: 2018-08-17

## 2018-08-17 RX ORDER — SENNOSIDES 8.6 MG TABLETS 8.6 MG/1
8.6 TABLET ORAL
Qty: 90 | Refills: 0 | Status: DISCONTINUED | COMMUNITY
Start: 2018-07-19 | End: 2018-08-17

## 2018-08-17 RX ORDER — ASPIRIN 81 MG/1
81 TABLET, CHEWABLE ORAL
Qty: 90 | Refills: 3 | Status: DISCONTINUED | COMMUNITY
Start: 2018-07-19 | End: 2018-08-17

## 2018-08-17 RX ORDER — FUROSEMIDE 80 MG/1
80 TABLET ORAL TWICE DAILY
Qty: 60 | Refills: 11 | Status: DISCONTINUED | COMMUNITY
Start: 2018-07-25 | End: 2018-08-17

## 2018-08-17 RX ORDER — TACROLIMUS 5 MG/1
5 CAPSULE ORAL
Qty: 180 | Refills: 3 | Status: DISCONTINUED | COMMUNITY
Start: 2018-07-19 | End: 2018-08-17

## 2018-08-17 RX ORDER — METOLAZONE 5 MG/1
5 TABLET ORAL DAILY
Qty: 30 | Refills: 0 | Status: DISCONTINUED | COMMUNITY
Start: 2018-08-01 | End: 2018-08-17

## 2018-08-17 RX ORDER — DOCUSATE SODIUM 100 MG/1
100 CAPSULE ORAL 3 TIMES DAILY
Qty: 90 | Refills: 2 | Status: DISCONTINUED | COMMUNITY
Start: 2018-07-19 | End: 2018-08-17

## 2018-08-19 LAB
OXALATE RANDOM URINE CREATININE: 156.3 MG/DL — SIGNIFICANT CHANGE UP (ref 20–370)
OXALATE RANDOM URINE: 45 MG/G CREAT — HIGH (ref 3–30)

## 2018-08-20 ENCOUNTER — APPOINTMENT (OUTPATIENT)
Dept: TRANSPLANT | Facility: CLINIC | Age: 68
End: 2018-08-20
Payer: MEDICARE

## 2018-08-20 ENCOUNTER — LABORATORY RESULT (OUTPATIENT)
Age: 68
End: 2018-08-20

## 2018-08-20 VITALS
SYSTOLIC BLOOD PRESSURE: 145 MMHG | WEIGHT: 225 LBS | TEMPERATURE: 98.5 F | BODY MASS INDEX: 32.21 KG/M2 | HEIGHT: 70 IN | HEART RATE: 79 BPM | DIASTOLIC BLOOD PRESSURE: 79 MMHG | OXYGEN SATURATION: 98 % | RESPIRATION RATE: 14 BRPM

## 2018-08-20 PROCEDURE — XXXXX: CPT

## 2018-08-20 PROCEDURE — 99213 OFFICE O/P EST LOW 20 MIN: CPT | Mod: 24

## 2018-08-21 LAB
ALBUMIN SERPL ELPH-MCNC: 3.7 G/DL
ALP BLD-CCNC: 96 U/L
ALT SERPL-CCNC: 32 U/L
ANION GAP SERPL CALC-SCNC: 15 MMOL/L
APPEARANCE: ABNORMAL
AST SERPL-CCNC: 21 U/L
BACTERIA: NEGATIVE
BASOPHILS # BLD AUTO: 0.03 K/UL
BASOPHILS NFR BLD AUTO: 0.6 %
BILIRUB SERPL-MCNC: 0.2 MG/DL
BILIRUBIN URINE: NEGATIVE
BKV DNA SPEC QL NAA+PROBE: NORMAL
BKV DNA SPEC QL NAA+PROBE: NORMAL
BLOOD URINE: ABNORMAL
BUN SERPL-MCNC: 60 MG/DL
CALCIUM SERPL-MCNC: 9.2 MG/DL
CHLORIDE SERPL-SCNC: 105 MMOL/L
CO2 SERPL-SCNC: 24 MMOL/L
COLOR: YELLOW
CREAT SERPL-MCNC: 7.33 MG/DL
CREAT SPEC-SCNC: 95 MG/DL
CREAT/PROT UR: 4.2 RATIO
EOSINOPHIL # BLD AUTO: 0 K/UL
EOSINOPHIL NFR BLD AUTO: 0 %
GLUCOSE QUALITATIVE U: 250 MG/DL
GLUCOSE SERPL-MCNC: 242 MG/DL
HCT VFR BLD CALC: 28.4 %
HGB BLD-MCNC: 8.6 G/DL
HYALINE CASTS: 1 /LPF
IMM GRANULOCYTES NFR BLD AUTO: 4.8 %
KETONES URINE: NEGATIVE
LDH SERPL-CCNC: 208 U/L
LEUKOCYTE ESTERASE URINE: NEGATIVE
LYMPHOCYTES # BLD AUTO: 0.72 K/UL
LYMPHOCYTES NFR BLD AUTO: 15.6 %
MAGNESIUM SERPL-MCNC: 1.8 MG/DL
MAN DIFF?: NORMAL
MCHC RBC-ENTMCNC: 30.3 GM/DL
MCHC RBC-ENTMCNC: 30.7 PG
MCV RBC AUTO: 101.4 FL
MICROSCOPIC-UA: NORMAL
MONOCYTES # BLD AUTO: 1.03 K/UL
MONOCYTES NFR BLD AUTO: 22.2 %
NEUTROPHILS # BLD AUTO: 2.63 K/UL
NEUTROPHILS NFR BLD AUTO: 56.8 %
NITRITE URINE: NEGATIVE
PH URINE: 6.5
PHOSPHATE SERPL-MCNC: 3.5 MG/DL
PLATELET # BLD AUTO: 188 K/UL
POTASSIUM SERPL-SCNC: 4.3 MMOL/L
PROT SERPL-MCNC: 6.2 G/DL
PROT UR-MCNC: 402 MG/DL
PROTEIN URINE: 300 MG/DL
RBC # BLD: 2.8 M/UL
RBC # FLD: 18.9 %
RED BLOOD CELLS URINE: 317 /HPF
SODIUM SERPL-SCNC: 144 MMOL/L
SPECIFIC GRAVITY URINE: 1.01
SQUAMOUS EPITHELIAL CELLS: 4 /HPF
TACROLIMUS SERPL-MCNC: 47.2 NG/ML
URATE SERPL-MCNC: 8.4 MG/DL
UROBILINOGEN URINE: NEGATIVE MG/DL
WBC # FLD AUTO: 4.63 K/UL
WHITE BLOOD CELLS URINE: 24 /HPF

## 2018-08-22 ENCOUNTER — APPOINTMENT (OUTPATIENT)
Dept: TRANSPLANT | Facility: CLINIC | Age: 68
End: 2018-08-22
Payer: MEDICARE

## 2018-08-22 ENCOUNTER — LABORATORY RESULT (OUTPATIENT)
Age: 68
End: 2018-08-22

## 2018-08-22 VITALS
HEIGHT: 70 IN | SYSTOLIC BLOOD PRESSURE: 134 MMHG | DIASTOLIC BLOOD PRESSURE: 79 MMHG | BODY MASS INDEX: 32.21 KG/M2 | WEIGHT: 225 LBS | OXYGEN SATURATION: 97 % | HEART RATE: 86 BPM | TEMPERATURE: 98.5 F

## 2018-08-22 LAB
MISCELLANEOUS TEST NAME: SIGNIFICANT CHANGE UP
MISCELLANEOUS, NORMALS: SIGNIFICANT CHANGE UP
MISCELLANEOUS, RESULT: SIGNIFICANT CHANGE UP

## 2018-08-22 PROCEDURE — XXXXX: CPT

## 2018-08-22 PROCEDURE — 99213 OFFICE O/P EST LOW 20 MIN: CPT | Mod: 24

## 2018-08-22 RX ORDER — MYCOPHENOLATE MOFETIL 500 MG/1
500 TABLET ORAL
Qty: 360 | Refills: 3 | Status: DISCONTINUED | COMMUNITY
Start: 2018-07-19 | End: 2018-08-22

## 2018-08-23 ENCOUNTER — APPOINTMENT (OUTPATIENT)
Dept: TRANSPLANT | Facility: CLINIC | Age: 68
End: 2018-08-23

## 2018-08-23 LAB
ALBUMIN SERPL ELPH-MCNC: 4 G/DL
ALP BLD-CCNC: 104 U/L
ALT SERPL-CCNC: 46 U/L
ANION GAP SERPL CALC-SCNC: 16 MMOL/L
APPEARANCE: CLEAR
AST SERPL-CCNC: 22 U/L
BACTERIA: NEGATIVE
BASOPHILS # BLD AUTO: 0.02 K/UL
BASOPHILS NFR BLD AUTO: 0.4 %
BILIRUB SERPL-MCNC: 0.3 MG/DL
BILIRUBIN URINE: NEGATIVE
BKV DNA SPEC QL NAA+PROBE: NORMAL
BLOOD URINE: ABNORMAL
BUN SERPL-MCNC: 63 MG/DL
CALCIUM SERPL-MCNC: 9.4 MG/DL
CHLORIDE SERPL-SCNC: 107 MMOL/L
CO2 SERPL-SCNC: 20 MMOL/L
COLOR: YELLOW
CREAT SERPL-MCNC: 6.24 MG/DL
CREAT SPEC-SCNC: 99 MG/DL
CREAT/PROT UR: 1 RATIO
EOSINOPHIL # BLD AUTO: 0 K/UL
EOSINOPHIL NFR BLD AUTO: 0 %
GLUCOSE QUALITATIVE U: 500 MG/DL
GLUCOSE SERPL-MCNC: 271 MG/DL
HCT VFR BLD CALC: 30.6 %
HGB BLD-MCNC: 9.1 G/DL
HYALINE CASTS: 3 /LPF
IMM GRANULOCYTES NFR BLD AUTO: 1.4 %
KETONES URINE: NEGATIVE
LDH SERPL-CCNC: 226 U/L
LEUKOCYTE ESTERASE URINE: NEGATIVE
LYMPHOCYTES # BLD AUTO: 0.77 K/UL
LYMPHOCYTES NFR BLD AUTO: 15.7 %
MAGNESIUM SERPL-MCNC: 1.5 MG/DL
MAN DIFF?: NORMAL
MCHC RBC-ENTMCNC: 29.7 GM/DL
MCHC RBC-ENTMCNC: 31.4 PG
MCV RBC AUTO: 105.5 FL
MICROSCOPIC-UA: NORMAL
MONOCYTES # BLD AUTO: 0.91 K/UL
MONOCYTES NFR BLD AUTO: 18.6 %
NEUTROPHILS # BLD AUTO: 3.12 K/UL
NEUTROPHILS NFR BLD AUTO: 63.9 %
NITRITE URINE: NEGATIVE
PH URINE: 6.5
PHOSPHATE SERPL-MCNC: 3 MG/DL
PLATELET # BLD AUTO: 152 K/UL
POTASSIUM SERPL-SCNC: 5.1 MMOL/L
PROT SERPL-MCNC: 6.6 G/DL
PROT UR-MCNC: 95 MG/DL
PROTEIN URINE: 100 MG/DL
RBC # BLD: 2.9 M/UL
RBC # FLD: 19.3 %
RED BLOOD CELLS URINE: 106 /HPF
SODIUM SERPL-SCNC: 143 MMOL/L
SPECIFIC GRAVITY URINE: 1.01
SQUAMOUS EPITHELIAL CELLS: 2 /HPF
TACROLIMUS SERPL-MCNC: 8.1 NG/ML
URATE SERPL-MCNC: 9.6 MG/DL
UROBILINOGEN URINE: NEGATIVE MG/DL
WBC # FLD AUTO: 4.89 K/UL
WHITE BLOOD CELLS URINE: 17 /HPF

## 2018-08-24 ENCOUNTER — APPOINTMENT (OUTPATIENT)
Dept: TRANSPLANT | Facility: CLINIC | Age: 68
End: 2018-08-24
Payer: MEDICARE

## 2018-08-24 ENCOUNTER — LABORATORY RESULT (OUTPATIENT)
Age: 68
End: 2018-08-24

## 2018-08-24 VITALS
BODY MASS INDEX: 33.95 KG/M2 | OXYGEN SATURATION: 89 % | HEART RATE: 77 BPM | HEIGHT: 68 IN | SYSTOLIC BLOOD PRESSURE: 149 MMHG | RESPIRATION RATE: 17 BRPM | WEIGHT: 224 LBS | DIASTOLIC BLOOD PRESSURE: 74 MMHG | TEMPERATURE: 98.1 F

## 2018-08-24 PROCEDURE — XXXXX: CPT

## 2018-08-24 PROCEDURE — 99024 POSTOP FOLLOW-UP VISIT: CPT

## 2018-08-25 LAB
ALBUMIN SERPL ELPH-MCNC: 4.2 G/DL
ALP BLD-CCNC: 101 U/L
ALT SERPL-CCNC: 33 U/L
ANION GAP SERPL CALC-SCNC: 14 MMOL/L
APPEARANCE: CLEAR
AST SERPL-CCNC: 18 U/L
BASOPHILS # BLD AUTO: 0.02 K/UL
BASOPHILS NFR BLD AUTO: 0.3 %
BILIRUB SERPL-MCNC: 0.3 MG/DL
BILIRUBIN URINE: NEGATIVE
BLOOD URINE: ABNORMAL
BUN SERPL-MCNC: 53 MG/DL
CALCIUM SERPL-MCNC: 9.6 MG/DL
CHLORIDE SERPL-SCNC: 110 MMOL/L
CO2 SERPL-SCNC: 23 MMOL/L
COLOR: YELLOW
CREAT SERPL-MCNC: 4.78 MG/DL
CREAT SPEC-SCNC: 79 MG/DL
CREAT/PROT UR: 0.9 RATIO
EOSINOPHIL # BLD AUTO: 0 K/UL
EOSINOPHIL NFR BLD AUTO: 0 %
GLUCOSE QUALITATIVE U: 1000 MG/DL
GLUCOSE SERPL-MCNC: 305 MG/DL
HCT VFR BLD CALC: 29.5 %
HGB BLD-MCNC: 9 G/DL
IMM GRANULOCYTES NFR BLD AUTO: 0.5 %
KETONES URINE: NEGATIVE
LDH SERPL-CCNC: 225 U/L
LEUKOCYTE ESTERASE URINE: NEGATIVE
LYMPHOCYTES # BLD AUTO: 0.4 K/UL
LYMPHOCYTES NFR BLD AUTO: 6.9 %
MAGNESIUM SERPL-MCNC: 1.5 MG/DL
MAN DIFF?: NORMAL
MCHC RBC-ENTMCNC: 30.5 GM/DL
MCHC RBC-ENTMCNC: 32.4 PG
MCV RBC AUTO: 106.1 FL
MONOCYTES # BLD AUTO: 0.18 K/UL
MONOCYTES NFR BLD AUTO: 3.1 %
NEUTROPHILS # BLD AUTO: 5.18 K/UL
NEUTROPHILS NFR BLD AUTO: 89.2 %
NITRITE URINE: NEGATIVE
PH URINE: 6.5
PHOSPHATE SERPL-MCNC: 2.6 MG/DL
PLATELET # BLD AUTO: 103 K/UL
POTASSIUM SERPL-SCNC: 5.8 MMOL/L
PROT SERPL-MCNC: 6.7 G/DL
PROT UR-MCNC: 68 MG/DL
PROTEIN URINE: 30 MG/DL
RBC # BLD: 2.78 M/UL
RBC # FLD: 19.7 %
SODIUM SERPL-SCNC: 147 MMOL/L
SPECIFIC GRAVITY URINE: 1.02
TACROLIMUS SERPL-MCNC: 5.2 NG/ML
URATE SERPL-MCNC: 9.6 MG/DL
UROBILINOGEN URINE: NEGATIVE MG/DL
WBC # FLD AUTO: 5.81 K/UL

## 2018-08-27 ENCOUNTER — APPOINTMENT (OUTPATIENT)
Dept: NEPHROLOGY | Facility: CLINIC | Age: 68
End: 2018-08-27
Payer: MEDICARE

## 2018-08-27 ENCOUNTER — LABORATORY RESULT (OUTPATIENT)
Age: 68
End: 2018-08-27

## 2018-08-27 VITALS
DIASTOLIC BLOOD PRESSURE: 78 MMHG | BODY MASS INDEX: 33.95 KG/M2 | SYSTOLIC BLOOD PRESSURE: 157 MMHG | OXYGEN SATURATION: 98 % | HEIGHT: 68 IN | WEIGHT: 224 LBS | HEART RATE: 89 BPM | RESPIRATION RATE: 14 BRPM | TEMPERATURE: 98.2 F

## 2018-08-27 LAB
ALBUMIN SERPL ELPH-MCNC: 3.6 G/DL
ALP BLD-CCNC: 110 U/L
ALT SERPL-CCNC: 44 U/L
ANION GAP SERPL CALC-SCNC: 12 MMOL/L
APPEARANCE: ABNORMAL
AST SERPL-CCNC: 20 U/L
BASOPHILS # BLD AUTO: 0.02 K/UL
BASOPHILS NFR BLD AUTO: 0.3 %
BILIRUB SERPL-MCNC: 0.3 MG/DL
BILIRUBIN URINE: NEGATIVE
BLOOD URINE: ABNORMAL
BUN SERPL-MCNC: 46 MG/DL
CALCIUM SERPL-MCNC: 9.1 MG/DL
CHLORIDE SERPL-SCNC: 112 MMOL/L
CO2 SERPL-SCNC: 18 MMOL/L
COLOR: YELLOW
CREAT SERPL-MCNC: 3.67 MG/DL
CREAT SPEC-SCNC: 104 MG/DL
CREAT/PROT UR: 2.4 RATIO
EOSINOPHIL # BLD AUTO: 0 K/UL
EOSINOPHIL NFR BLD AUTO: 0 %
GLUCOSE QUALITATIVE U: 250 MG/DL
GLUCOSE SERPL-MCNC: 228 MG/DL
HCT VFR BLD CALC: 29 %
HGB BLD-MCNC: 8.9 G/DL
IMM GRANULOCYTES NFR BLD AUTO: 0.3 %
KETONES URINE: NEGATIVE
LDH SERPL-CCNC: 208 U/L
LEUKOCYTE ESTERASE URINE: ABNORMAL
LYMPHOCYTES # BLD AUTO: 0.6 K/UL
LYMPHOCYTES NFR BLD AUTO: 10.2 %
MAGNESIUM SERPL-MCNC: 1.3 MG/DL
MAN DIFF?: NORMAL
MCHC RBC-ENTMCNC: 30.7 GM/DL
MCHC RBC-ENTMCNC: 31.2 PG
MCV RBC AUTO: 101.8 FL
MONOCYTES # BLD AUTO: 0.64 K/UL
MONOCYTES NFR BLD AUTO: 10.9 %
NEUTROPHILS # BLD AUTO: 4.59 K/UL
NEUTROPHILS NFR BLD AUTO: 78.3 %
NITRITE URINE: NEGATIVE
PH URINE: 6
PHOSPHATE SERPL-MCNC: 2.5 MG/DL
PLATELET # BLD AUTO: 71 K/UL
POTASSIUM SERPL-SCNC: 5.1 MMOL/L
PROT SERPL-MCNC: 6.2 G/DL
PROT UR-MCNC: 248 MG/DL
PROTEIN URINE: 300 MG/DL
RBC # BLD: 2.85 M/UL
RBC # FLD: 19.8 %
SODIUM SERPL-SCNC: 142 MMOL/L
SPECIFIC GRAVITY URINE: 1.02
TACROLIMUS SERPL-MCNC: 6.5 NG/ML
URATE SERPL-MCNC: 8.9 MG/DL
UROBILINOGEN URINE: NEGATIVE MG/DL
WBC # FLD AUTO: 5.87 K/UL

## 2018-08-27 PROCEDURE — 99215 OFFICE O/P EST HI 40 MIN: CPT

## 2018-08-27 RX ORDER — FUROSEMIDE 40 MG/1
40 TABLET ORAL ONCE
Qty: 1 | Refills: 0 | Status: DISCONTINUED | COMMUNITY
Start: 2018-08-25 | End: 2018-08-27

## 2018-08-31 ENCOUNTER — OTHER (OUTPATIENT)
Age: 68
End: 2018-08-31

## 2018-09-04 LAB — BACTERIA UR CULT: ABNORMAL

## 2018-09-06 ENCOUNTER — FORM ENCOUNTER (OUTPATIENT)
Age: 68
End: 2018-09-06

## 2018-09-06 ENCOUNTER — APPOINTMENT (OUTPATIENT)
Dept: TRANSPLANT | Facility: CLINIC | Age: 68
End: 2018-09-06
Payer: MEDICARE

## 2018-09-06 LAB
BKV DNA SPEC QL NAA+PROBE: NORMAL
BKV DNA SPEC QL NAA+PROBE: NORMAL
HCV RNA SERPL NAA DL=5-ACNC: NOT DETECTED IU/ML
HCV RNA SERPL NAA+PROBE-LOG IU: NOT DETECTED LOGIU/ML

## 2018-09-06 PROCEDURE — 99213 OFFICE O/P EST LOW 20 MIN: CPT

## 2018-09-07 ENCOUNTER — OUTPATIENT (OUTPATIENT)
Dept: OUTPATIENT SERVICES | Facility: HOSPITAL | Age: 68
LOS: 1 days | End: 2018-09-07
Payer: MEDICARE

## 2018-09-07 DIAGNOSIS — I77.0 ARTERIOVENOUS FISTULA, ACQUIRED: Chronic | ICD-10-CM

## 2018-09-07 DIAGNOSIS — Z90.5 ACQUIRED ABSENCE OF KIDNEY: Chronic | ICD-10-CM

## 2018-09-07 DIAGNOSIS — Z94.0 KIDNEY TRANSPLANT STATUS: ICD-10-CM

## 2018-09-07 LAB
ALBUMIN SERPL ELPH-MCNC: 4.1 G/DL
ALP BLD-CCNC: 118 U/L
ALT SERPL-CCNC: 6 U/L
ANION GAP SERPL CALC-SCNC: 19 MMOL/L
APPEARANCE: CLEAR
AST SERPL-CCNC: 12 U/L
BACTERIA: NEGATIVE
BASOPHILS # BLD AUTO: 0.01 K/UL
BASOPHILS NFR BLD AUTO: 0.3 %
BILIRUB SERPL-MCNC: 0.2 MG/DL
BILIRUBIN URINE: NEGATIVE
BLOOD URINE: ABNORMAL
BUN SERPL-MCNC: 49 MG/DL
CALCIUM SERPL-MCNC: 9.4 MG/DL
CHLORIDE SERPL-SCNC: 104 MMOL/L
CO2 SERPL-SCNC: 16 MMOL/L
COLOR: YELLOW
CREAT SERPL-MCNC: 5.14 MG/DL
CREAT SPEC-SCNC: 111 MG/DL
CREAT/PROT UR: 0.7 RATIO
EOSINOPHIL # BLD AUTO: 0.04 K/UL
EOSINOPHIL NFR BLD AUTO: 1.2 %
GLUCOSE QUALITATIVE U: NEGATIVE MG/DL
GLUCOSE SERPL-MCNC: 229 MG/DL
HCT VFR BLD CALC: 28.1 %
HGB BLD-MCNC: 9.1 G/DL
HYALINE CASTS: 0 /LPF
IMM GRANULOCYTES NFR BLD AUTO: 0.6 %
KETONES URINE: NEGATIVE
LDH SERPL-CCNC: 223 U/L
LEUKOCYTE ESTERASE URINE: NEGATIVE
LYMPHOCYTES # BLD AUTO: 0.69 K/UL
LYMPHOCYTES NFR BLD AUTO: 21 %
MAGNESIUM SERPL-MCNC: 1.3 MG/DL
MAN DIFF?: NORMAL
MCHC RBC-ENTMCNC: 32.4 GM/DL
MCHC RBC-ENTMCNC: 32.5 PG
MCV RBC AUTO: 100.4 FL
MICROSCOPIC-UA: NORMAL
MONOCYTES # BLD AUTO: 0.22 K/UL
MONOCYTES NFR BLD AUTO: 6.7 %
NEUTROPHILS # BLD AUTO: 2.31 K/UL
NEUTROPHILS NFR BLD AUTO: 70.2 %
NITRITE URINE: NEGATIVE
PH URINE: 6
PHOSPHATE SERPL-MCNC: 4.1 MG/DL
PLATELET # BLD AUTO: 124 K/UL
POTASSIUM SERPL-SCNC: 5.2 MMOL/L
PROT SERPL-MCNC: 6.7 G/DL
PROT UR-MCNC: 76 MG/DL
PROTEIN URINE: 100 MG/DL
RBC # BLD: 2.8 M/UL
RBC # FLD: 19.6 %
RED BLOOD CELLS URINE: 36 /HPF
SODIUM SERPL-SCNC: 139 MMOL/L
SPECIFIC GRAVITY URINE: 1.01
SQUAMOUS EPITHELIAL CELLS: 1 /HPF
TACROLIMUS SERPL-MCNC: 9.2 NG/ML
URATE SERPL-MCNC: 11.8 MG/DL
UROBILINOGEN URINE: NEGATIVE MG/DL
WBC # FLD AUTO: 3.29 K/UL
WHITE BLOOD CELLS URINE: 12 /HPF

## 2018-09-07 PROCEDURE — 76776 US EXAM K TRANSPL W/DOPPLER: CPT | Mod: 26,RT

## 2018-09-07 PROCEDURE — 76776 US EXAM K TRANSPL W/DOPPLER: CPT

## 2018-09-07 PROCEDURE — 76770 US EXAM ABDO BACK WALL COMP: CPT

## 2018-09-10 ENCOUNTER — APPOINTMENT (OUTPATIENT)
Dept: NEPHROLOGY | Facility: CLINIC | Age: 68
End: 2018-09-10
Payer: MEDICARE

## 2018-09-10 ENCOUNTER — LABORATORY RESULT (OUTPATIENT)
Age: 68
End: 2018-09-10

## 2018-09-10 VITALS
HEIGHT: 68 IN | OXYGEN SATURATION: 95 % | BODY MASS INDEX: 32.43 KG/M2 | SYSTOLIC BLOOD PRESSURE: 138 MMHG | DIASTOLIC BLOOD PRESSURE: 78 MMHG | HEART RATE: 103 BPM | WEIGHT: 214 LBS | RESPIRATION RATE: 12 BRPM | TEMPERATURE: 98 F

## 2018-09-10 DIAGNOSIS — M10.9 GOUT, UNSPECIFIED: ICD-10-CM

## 2018-09-10 LAB — BKV DNA SPEC QL NAA+PROBE: NORMAL

## 2018-09-10 PROCEDURE — 99215 OFFICE O/P EST HI 40 MIN: CPT

## 2018-09-10 RX ORDER — AMOXICILLIN 500 MG/1
500 CAPSULE ORAL TWICE DAILY
Qty: 14 | Refills: 0 | Status: DISCONTINUED | COMMUNITY
Start: 2018-08-31 | End: 2018-09-10

## 2018-09-10 RX ORDER — FUROSEMIDE 80 MG/1
80 TABLET ORAL DAILY
Qty: 30 | Refills: 2 | Status: DISCONTINUED | COMMUNITY
Start: 2018-08-27 | End: 2018-09-10

## 2018-09-11 DIAGNOSIS — N18.9 CHRONIC KIDNEY DISEASE, UNSPECIFIED: ICD-10-CM

## 2018-09-11 DIAGNOSIS — D63.1 CHRONIC KIDNEY DISEASE, UNSPECIFIED: ICD-10-CM

## 2018-09-11 PROBLEM — M10.9 GOUT: Status: ACTIVE | Noted: 2018-09-11

## 2018-09-11 LAB
ALBUMIN SERPL ELPH-MCNC: 4 G/DL
ALP BLD-CCNC: 110 U/L
ALT SERPL-CCNC: 7 U/L
ANION GAP SERPL CALC-SCNC: 13 MMOL/L
APPEARANCE: ABNORMAL
AST SERPL-CCNC: 16 U/L
BACTERIA: NEGATIVE
BASOPHILS # BLD AUTO: 0.05 K/UL
BASOPHILS NFR BLD AUTO: 1.8 %
BILIRUB SERPL-MCNC: 0.2 MG/DL
BILIRUBIN URINE: NEGATIVE
BKV DNA SPEC QL NAA+PROBE: NORMAL
BLOOD URINE: ABNORMAL
BUN SERPL-MCNC: 52 MG/DL
CALCIUM SERPL-MCNC: 9.4 MG/DL
CHLORIDE SERPL-SCNC: 103 MMOL/L
CO2 SERPL-SCNC: 18 MMOL/L
COLOR: YELLOW
CREAT SERPL-MCNC: 5.21 MG/DL
CREAT SPEC-SCNC: 132 MG/DL
CREAT/PROT UR: 0.9 RATIO
EOSINOPHIL # BLD AUTO: 0.03 K/UL
EOSINOPHIL NFR BLD AUTO: 1.1 %
GLUCOSE QUALITATIVE U: NEGATIVE MG/DL
GLUCOSE SERPL-MCNC: 206 MG/DL
HCT VFR BLD CALC: 28.3 %
HGB BLD-MCNC: 8.9 G/DL
HYALINE CASTS: 2 /LPF
IMM GRANULOCYTES NFR BLD AUTO: 0.4 %
KETONES URINE: NEGATIVE
LDH SERPL-CCNC: 237 U/L
LEUKOCYTE ESTERASE URINE: ABNORMAL
LYMPHOCYTES # BLD AUTO: 0.62 K/UL
LYMPHOCYTES NFR BLD AUTO: 22.8 %
MAGNESIUM SERPL-MCNC: 1.3 MG/DL
MAN DIFF?: NORMAL
MCHC RBC-ENTMCNC: 31.4 GM/DL
MCHC RBC-ENTMCNC: 31.7 PG
MCV RBC AUTO: 100.7 FL
MICROSCOPIC-UA: NORMAL
MONOCYTES # BLD AUTO: 0.32 K/UL
MONOCYTES NFR BLD AUTO: 11.8 %
NEUTROPHILS # BLD AUTO: 1.69 K/UL
NEUTROPHILS NFR BLD AUTO: 62.1 %
NITRITE URINE: NEGATIVE
PH URINE: 5.5
PHOSPHATE SERPL-MCNC: 3.9 MG/DL
PLATELET # BLD AUTO: 146 K/UL
POTASSIUM SERPL-SCNC: 5.3 MMOL/L
PROT SERPL-MCNC: 6.8 G/DL
PROT UR-MCNC: 116 MG/DL
PROTEIN URINE: 100 MG/DL
RBC # BLD: 2.81 M/UL
RBC # FLD: 19.2 %
RED BLOOD CELLS URINE: 82 /HPF
SODIUM SERPL-SCNC: 134 MMOL/L
SPECIFIC GRAVITY URINE: 1.01
SQUAMOUS EPITHELIAL CELLS: 0 /HPF
TACROLIMUS SERPL-MCNC: 11.8 NG/ML
URATE SERPL-MCNC: 12.1 MG/DL
UROBILINOGEN URINE: NEGATIVE MG/DL
WBC # FLD AUTO: 2.72 K/UL
WHITE BLOOD CELLS URINE: 100 /HPF

## 2018-09-11 RX ORDER — VALGANCICLOVIR HYDROCHLORIDE 450 MG/1
450 TABLET ORAL DAILY
Qty: 90 | Refills: 1 | Status: DISCONTINUED | COMMUNITY
Start: 2018-07-19 | End: 2018-09-11

## 2018-09-13 ENCOUNTER — APPOINTMENT (OUTPATIENT)
Dept: TRANSPLANT | Facility: CLINIC | Age: 68
End: 2018-09-13

## 2018-09-13 ENCOUNTER — LABORATORY RESULT (OUTPATIENT)
Age: 68
End: 2018-09-13

## 2018-09-14 ENCOUNTER — INPATIENT (INPATIENT)
Facility: HOSPITAL | Age: 68
LOS: 6 days | Discharge: HOME | DRG: 641 | End: 2018-09-21
Attending: TRANSPLANT SURGERY | Admitting: RADIOLOGY
Payer: MEDICARE

## 2018-09-14 VITALS
SYSTOLIC BLOOD PRESSURE: 152 MMHG | HEART RATE: 95 BPM | TEMPERATURE: 98 F | HEIGHT: 70 IN | RESPIRATION RATE: 16 BRPM | WEIGHT: 216.27 LBS | DIASTOLIC BLOOD PRESSURE: 79 MMHG | OXYGEN SATURATION: 95 %

## 2018-09-14 DIAGNOSIS — Z90.5 ACQUIRED ABSENCE OF KIDNEY: Chronic | ICD-10-CM

## 2018-09-14 DIAGNOSIS — Z94.0 KIDNEY TRANSPLANT STATUS: ICD-10-CM

## 2018-09-14 DIAGNOSIS — E87.5 HYPERKALEMIA: ICD-10-CM

## 2018-09-14 DIAGNOSIS — E11.9 TYPE 2 DIABETES MELLITUS WITHOUT COMPLICATIONS: ICD-10-CM

## 2018-09-14 DIAGNOSIS — I77.0 ARTERIOVENOUS FISTULA, ACQUIRED: Chronic | ICD-10-CM

## 2018-09-14 DIAGNOSIS — N13.30 UNSPECIFIED HYDRONEPHROSIS: ICD-10-CM

## 2018-09-14 DIAGNOSIS — Z94.89 OTHER TRANSPLANTED ORGAN AND TISSUE STATUS: ICD-10-CM

## 2018-09-14 LAB
ALBUMIN SERPL ELPH-MCNC: 4.2 G/DL
ALP BLD-CCNC: 125 U/L
ALT SERPL-CCNC: 5 U/L
ANION GAP SERPL CALC-SCNC: 11 MMOL/L — SIGNIFICANT CHANGE UP (ref 5–17)
ANION GAP SERPL CALC-SCNC: 12 MMOL/L
APPEARANCE UR: CLEAR — SIGNIFICANT CHANGE UP
APPEARANCE: CLEAR
AST SERPL-CCNC: 11 U/L
BACTERIA: NEGATIVE
BASE EXCESS BLDA CALC-SCNC: -3.9 MMOL/L — LOW (ref -2–2)
BASOPHILS # BLD AUTO: 0 K/UL — SIGNIFICANT CHANGE UP (ref 0–0.2)
BASOPHILS # BLD AUTO: 0.05 K/UL
BASOPHILS NFR BLD AUTO: 0.3 % — SIGNIFICANT CHANGE UP (ref 0–2)
BASOPHILS NFR BLD AUTO: 1.7 %
BILIRUB SERPL-MCNC: <0.2 MG/DL
BILIRUB UR-MCNC: NEGATIVE — SIGNIFICANT CHANGE UP
BILIRUBIN URINE: NEGATIVE
BLOOD URINE: ABNORMAL
BUN SERPL-MCNC: 45 MG/DL — HIGH (ref 7–23)
BUN SERPL-MCNC: 46 MG/DL
CALCIUM SERPL-MCNC: 10.1 MG/DL — SIGNIFICANT CHANGE UP (ref 8.4–10.5)
CALCIUM SERPL-MCNC: 9.7 MG/DL
CHLORIDE SERPL-SCNC: 108 MMOL/L
CHLORIDE SERPL-SCNC: 109 MMOL/L — HIGH (ref 96–108)
CMV DNA SPEC QL NAA+PROBE: NOT DETECTED IU/ML
CO2 BLDA-SCNC: 21 MMOL/L — LOW (ref 22–30)
CO2 SERPL-SCNC: 17 MMOL/L — LOW (ref 22–31)
CO2 SERPL-SCNC: 19 MMOL/L
COLOR SPEC: YELLOW — SIGNIFICANT CHANGE UP
COLOR: YELLOW
CREAT FLD-MCNC: 4.25 MG/DL — SIGNIFICANT CHANGE UP
CREAT SERPL-MCNC: 4.29 MG/DL
CREAT SERPL-MCNC: 4.55 MG/DL — HIGH (ref 0.5–1.3)
CREAT SPEC-SCNC: 126 MG/DL
CREAT/PROT UR: 0.4 RATIO
DIFF PNL FLD: ABNORMAL
EOSINOPHIL # BLD AUTO: 0 K/UL — SIGNIFICANT CHANGE UP (ref 0–0.5)
EOSINOPHIL # BLD AUTO: 0.02 K/UL
EOSINOPHIL NFR BLD AUTO: 0.7 %
EOSINOPHIL NFR BLD AUTO: 1.1 % — SIGNIFICANT CHANGE UP (ref 0–6)
GAS PNL BLDA: SIGNIFICANT CHANGE UP
GLUCOSE BLDC GLUCOMTR-MCNC: 117 MG/DL — HIGH (ref 70–99)
GLUCOSE BLDC GLUCOMTR-MCNC: 151 MG/DL — HIGH (ref 70–99)
GLUCOSE BLDC GLUCOMTR-MCNC: 78 MG/DL — SIGNIFICANT CHANGE UP (ref 70–99)
GLUCOSE BLDC GLUCOMTR-MCNC: 80 MG/DL — SIGNIFICANT CHANGE UP (ref 70–99)
GLUCOSE BLDC GLUCOMTR-MCNC: 88 MG/DL — SIGNIFICANT CHANGE UP (ref 70–99)
GLUCOSE QUALITATIVE U: 250 MG/DL
GLUCOSE SERPL-MCNC: 174 MG/DL — HIGH (ref 70–99)
GLUCOSE SERPL-MCNC: 214 MG/DL
GLUCOSE UR QL: NEGATIVE MG/DL — SIGNIFICANT CHANGE UP
HCO3 BLDA-SCNC: 20 MMOL/L — LOW (ref 21–29)
HCT VFR BLD CALC: 27.2 % — LOW (ref 39–50)
HCT VFR BLD CALC: 28.4 %
HGB BLD-MCNC: 9 G/DL
HGB BLD-MCNC: 9.1 G/DL — LOW (ref 13–17)
HOROWITZ INDEX BLDA+IHG-RTO: SIGNIFICANT CHANGE UP
HYALINE CASTS: 2 /LPF
IMM GRANULOCYTES NFR BLD AUTO: 0.7 %
KETONES UR-MCNC: NEGATIVE — SIGNIFICANT CHANGE UP
KETONES URINE: NEGATIVE
LDH SERPL-CCNC: 200 U/L
LEUKOCYTE ESTERASE UR-ACNC: NEGATIVE — SIGNIFICANT CHANGE UP
LEUKOCYTE ESTERASE URINE: NEGATIVE
LYMPHOCYTES # BLD AUTO: 0.5 K/UL
LYMPHOCYTES # BLD AUTO: 0.8 K/UL — LOW (ref 1–3.3)
LYMPHOCYTES # BLD AUTO: 28.3 % — SIGNIFICANT CHANGE UP (ref 13–44)
LYMPHOCYTES NFR BLD AUTO: 17.2 %
MAGNESIUM SERPL-MCNC: 1.3 MG/DL — LOW (ref 1.6–2.6)
MAGNESIUM SERPL-MCNC: 1.4 MG/DL
MAN DIFF?: NORMAL
MCHC RBC-ENTMCNC: 31.7 GM/DL
MCHC RBC-ENTMCNC: 32.4 PG
MCHC RBC-ENTMCNC: 33.3 PG — SIGNIFICANT CHANGE UP (ref 27–34)
MCHC RBC-ENTMCNC: 33.4 GM/DL — SIGNIFICANT CHANGE UP (ref 32–36)
MCV RBC AUTO: 102.2 FL
MCV RBC AUTO: 99.9 FL — SIGNIFICANT CHANGE UP (ref 80–100)
MICROSCOPIC-UA: NORMAL
MONOCYTES # BLD AUTO: 0.24 K/UL
MONOCYTES # BLD AUTO: 0.3 K/UL — SIGNIFICANT CHANGE UP (ref 0–0.9)
MONOCYTES NFR BLD AUTO: 8.3 %
MONOCYTES NFR BLD AUTO: 8.4 % — SIGNIFICANT CHANGE UP (ref 2–14)
NEUTROPHILS # BLD AUTO: 1.8 K/UL — SIGNIFICANT CHANGE UP (ref 1.8–7.4)
NEUTROPHILS # BLD AUTO: 2.07 K/UL
NEUTROPHILS NFR BLD AUTO: 61.8 % — SIGNIFICANT CHANGE UP (ref 43–77)
NEUTROPHILS NFR BLD AUTO: 71.4 %
NITRITE UR-MCNC: NEGATIVE — SIGNIFICANT CHANGE UP
NITRITE URINE: NEGATIVE
PCO2 BLDA: 34 MMHG — SIGNIFICANT CHANGE UP (ref 32–46)
PH BLDA: 7.39 — SIGNIFICANT CHANGE UP (ref 7.35–7.45)
PH UR: 6 — SIGNIFICANT CHANGE UP (ref 5–8)
PH URINE: 5.5
PHOSPHATE SERPL-MCNC: 2.6 MG/DL — SIGNIFICANT CHANGE UP (ref 2.5–4.5)
PHOSPHATE SERPL-MCNC: 3.2 MG/DL
PLATELET # BLD AUTO: 136 K/UL — LOW (ref 150–400)
PLATELET # BLD AUTO: 158 K/UL
PO2 BLDA: 166 MMHG — HIGH (ref 74–108)
POTASSIUM SERPL-MCNC: 5.3 MMOL/L — SIGNIFICANT CHANGE UP (ref 3.5–5.3)
POTASSIUM SERPL-SCNC: 5.3 MMOL/L — SIGNIFICANT CHANGE UP (ref 3.5–5.3)
POTASSIUM SERPL-SCNC: 5.8 MMOL/L
PROT SERPL-MCNC: 6.7 G/DL
PROT UR-MCNC: 100 MG/DL
PROT UR-MCNC: 57 MG/DL
PROTEIN URINE: 100 MG/DL
RBC # BLD: 2.72 M/UL — LOW (ref 4.2–5.8)
RBC # BLD: 2.78 M/UL
RBC # FLD: 18 % — HIGH (ref 10.3–14.5)
RBC # FLD: 19.1 %
RED BLOOD CELLS URINE: 45 /HPF
SAO2 % BLDA: 99 % — HIGH (ref 92–96)
SODIUM SERPL-SCNC: 137 MMOL/L — SIGNIFICANT CHANGE UP (ref 135–145)
SODIUM SERPL-SCNC: 139 MMOL/L
SP GR SPEC: 1.01 — SIGNIFICANT CHANGE UP (ref 1.01–1.02)
SPECIFIC GRAVITY URINE: 1.02
SPECIMEN SOURCE FLD: SIGNIFICANT CHANGE UP
SQUAMOUS EPITHELIAL CELLS: 1 /HPF
TACROLIMUS SERPL-MCNC: 12.7 NG/ML
UROBILINOGEN FLD QL: NEGATIVE MG/DL — SIGNIFICANT CHANGE UP
UROBILINOGEN URINE: NEGATIVE MG/DL
WBC # BLD: 3 K/UL — LOW (ref 3.8–10.5)
WBC # FLD AUTO: 2.9 K/UL
WBC # FLD AUTO: 3 K/UL — LOW (ref 3.8–10.5)
WHITE BLOOD CELLS URINE: 13 /HPF

## 2018-09-14 PROCEDURE — 99282 EMERGENCY DEPT VISIT SF MDM: CPT

## 2018-09-14 PROCEDURE — 10022: CPT

## 2018-09-14 PROCEDURE — 50432 PLMT NEPHROSTOMY CATHETER: CPT | Mod: RT

## 2018-09-14 PROCEDURE — 93010 ELECTROCARDIOGRAM REPORT: CPT

## 2018-09-14 PROCEDURE — 99223 1ST HOSP IP/OBS HIGH 75: CPT | Mod: GC

## 2018-09-14 PROCEDURE — 76942 ECHO GUIDE FOR BIOPSY: CPT | Mod: 26,59

## 2018-09-14 RX ORDER — TACROLIMUS 5 MG/1
5 CAPSULE ORAL
Qty: 0 | Refills: 0 | Status: DISCONTINUED | OUTPATIENT
Start: 2018-09-14 | End: 2018-09-15

## 2018-09-14 RX ORDER — DEXTROSE 50 % IN WATER 50 %
50 SYRINGE (ML) INTRAVENOUS ONCE
Qty: 0 | Refills: 0 | Status: COMPLETED | OUTPATIENT
Start: 2018-09-14 | End: 2018-09-14

## 2018-09-14 RX ORDER — GABAPENTIN 400 MG/1
300 CAPSULE ORAL DAILY
Qty: 0 | Refills: 0 | Status: DISCONTINUED | OUTPATIENT
Start: 2018-09-14 | End: 2018-09-21

## 2018-09-14 RX ORDER — ONDANSETRON 8 MG/1
4 TABLET, FILM COATED ORAL ONCE
Qty: 0 | Refills: 0 | Status: DISCONTINUED | OUTPATIENT
Start: 2018-09-14 | End: 2018-09-14

## 2018-09-14 RX ORDER — DEXTROSE 50 % IN WATER 50 %
25 SYRINGE (ML) INTRAVENOUS ONCE
Qty: 0 | Refills: 0 | Status: DISCONTINUED | OUTPATIENT
Start: 2018-09-14 | End: 2018-09-21

## 2018-09-14 RX ORDER — ALBUTEROL 90 UG/1
2.5 AEROSOL, METERED ORAL ONCE
Qty: 0 | Refills: 0 | Status: COMPLETED | OUTPATIENT
Start: 2018-09-14 | End: 2018-09-14

## 2018-09-14 RX ORDER — DEXTROSE 50 % IN WATER 50 %
15 SYRINGE (ML) INTRAVENOUS ONCE
Qty: 0 | Refills: 0 | Status: DISCONTINUED | OUTPATIENT
Start: 2018-09-14 | End: 2018-09-21

## 2018-09-14 RX ORDER — ALLOPURINOL 300 MG
100 TABLET ORAL DAILY
Qty: 0 | Refills: 0 | Status: DISCONTINUED | OUTPATIENT
Start: 2018-09-14 | End: 2018-09-21

## 2018-09-14 RX ORDER — SODIUM CHLORIDE 9 MG/ML
1000 INJECTION, SOLUTION INTRAVENOUS
Qty: 0 | Refills: 0 | Status: DISCONTINUED | OUTPATIENT
Start: 2018-09-14 | End: 2018-09-21

## 2018-09-14 RX ORDER — HEPARIN SODIUM 5000 [USP'U]/ML
5000 INJECTION INTRAVENOUS; SUBCUTANEOUS EVERY 12 HOURS
Qty: 0 | Refills: 0 | Status: DISCONTINUED | OUTPATIENT
Start: 2018-09-14 | End: 2018-09-14

## 2018-09-14 RX ORDER — FUROSEMIDE 40 MG
40 TABLET ORAL ONCE
Qty: 0 | Refills: 0 | Status: COMPLETED | OUTPATIENT
Start: 2018-09-14 | End: 2018-09-14

## 2018-09-14 RX ORDER — GLUCAGON INJECTION, SOLUTION 0.5 MG/.1ML
1 INJECTION, SOLUTION SUBCUTANEOUS ONCE
Qty: 0 | Refills: 0 | Status: DISCONTINUED | OUTPATIENT
Start: 2018-09-14 | End: 2018-09-21

## 2018-09-14 RX ORDER — INSULIN LISPRO 100/ML
VIAL (ML) SUBCUTANEOUS AT BEDTIME
Qty: 0 | Refills: 0 | Status: DISCONTINUED | OUTPATIENT
Start: 2018-09-14 | End: 2018-09-21

## 2018-09-14 RX ORDER — MYCOPHENOLATE MOFETIL 250 MG/1
500 CAPSULE ORAL
Qty: 0 | Refills: 0 | Status: DISCONTINUED | OUTPATIENT
Start: 2018-09-14 | End: 2018-09-19

## 2018-09-14 RX ORDER — INFLUENZA VIRUS VACCINE 15; 15; 15; 15 UG/.5ML; UG/.5ML; UG/.5ML; UG/.5ML
0.5 SUSPENSION INTRAMUSCULAR ONCE
Qty: 0 | Refills: 0 | Status: DISCONTINUED | OUTPATIENT
Start: 2018-09-14 | End: 2018-09-21

## 2018-09-14 RX ORDER — INSULIN LISPRO 100/ML
10 VIAL (ML) SUBCUTANEOUS ONCE
Qty: 0 | Refills: 0 | Status: COMPLETED | OUTPATIENT
Start: 2018-09-14 | End: 2018-09-14

## 2018-09-14 RX ORDER — CALCIUM GLUCONATE 100 MG/ML
1 VIAL (ML) INTRAVENOUS ONCE
Qty: 0 | Refills: 0 | Status: COMPLETED | OUTPATIENT
Start: 2018-09-14 | End: 2018-09-14

## 2018-09-14 RX ORDER — NYSTATIN 500MM UNIT
500000 POWDER (EA) MISCELLANEOUS
Qty: 0 | Refills: 0 | Status: DISCONTINUED | OUTPATIENT
Start: 2018-09-14 | End: 2018-09-21

## 2018-09-14 RX ORDER — INSULIN LISPRO 100/ML
VIAL (ML) SUBCUTANEOUS
Qty: 0 | Refills: 0 | Status: DISCONTINUED | OUTPATIENT
Start: 2018-09-14 | End: 2018-09-21

## 2018-09-14 RX ORDER — DEXTROSE 50 % IN WATER 50 %
12.5 SYRINGE (ML) INTRAVENOUS ONCE
Qty: 0 | Refills: 0 | Status: DISCONTINUED | OUTPATIENT
Start: 2018-09-14 | End: 2018-09-21

## 2018-09-14 RX ORDER — SODIUM BICARBONATE 1 MEQ/ML
25 SYRINGE (ML) INTRAVENOUS ONCE
Qty: 0 | Refills: 0 | Status: COMPLETED | OUTPATIENT
Start: 2018-09-14 | End: 2018-09-14

## 2018-09-14 RX ORDER — FAMOTIDINE 10 MG/ML
20 INJECTION INTRAVENOUS DAILY
Qty: 0 | Refills: 0 | Status: DISCONTINUED | OUTPATIENT
Start: 2018-09-14 | End: 2018-09-21

## 2018-09-14 RX ORDER — MAGNESIUM SULFATE 500 MG/ML
2 VIAL (ML) INJECTION ONCE
Qty: 0 | Refills: 0 | Status: COMPLETED | OUTPATIENT
Start: 2018-09-14 | End: 2018-09-14

## 2018-09-14 RX ORDER — TAMSULOSIN HYDROCHLORIDE 0.4 MG/1
0.4 CAPSULE ORAL AT BEDTIME
Qty: 0 | Refills: 0 | Status: DISCONTINUED | OUTPATIENT
Start: 2018-09-14 | End: 2018-09-21

## 2018-09-14 RX ADMIN — Medication 25 MILLIEQUIVALENT(S): at 17:43

## 2018-09-14 RX ADMIN — Medication 200 GRAM(S): at 17:56

## 2018-09-14 RX ADMIN — Medication 50 GRAM(S): at 19:52

## 2018-09-14 RX ADMIN — Medication 40 MILLIGRAM(S): at 16:58

## 2018-09-14 RX ADMIN — TACROLIMUS 5 MILLIGRAM(S): 5 CAPSULE ORAL at 17:30

## 2018-09-14 RX ADMIN — MYCOPHENOLATE MOFETIL 500 MILLIGRAM(S): 250 CAPSULE ORAL at 17:30

## 2018-09-14 RX ADMIN — Medication 10 UNIT(S): at 17:25

## 2018-09-14 RX ADMIN — ALBUTEROL 2.5 MILLIGRAM(S): 90 AEROSOL, METERED ORAL at 17:27

## 2018-09-14 RX ADMIN — Medication 50 MILLILITER(S): at 17:14

## 2018-09-14 RX ADMIN — Medication 500000 UNIT(S): at 17:30

## 2018-09-14 NOTE — H&P ADULT - PROBLEM SELECTOR PLAN 1
-Admit to transplant surgery  - Medical treatment with Insulin, D50, albuterol, calcium, 40 mg IV lasix, amp of bicarb  -Repeat K at 1800, will plan to continue with nephrostomy tube if normalized.  -If K remains elevated with plan to dialyze

## 2018-09-14 NOTE — H&P ADULT - PROBLEM SELECTOR PLAN 4
- Will repeat K and if normalized plan for IR guided placement of nephrostomy tube and drainage of perinephric fluid collection

## 2018-09-14 NOTE — H&P ADULT - PROBLEM SELECTOR PLAN 3
- Continue tacrolimus 5/5, check level   - Continue prednisone 5 mg  - Continue nystatin, bactrim, hold valcyte due to leukopenia on outpatient labs   - Patient will need Epclusa for HepC+ donor, will instruct patient to bring in from home

## 2018-09-14 NOTE — H&P ADULT - HISTORY OF PRESENT ILLNESS
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 CIT 22 hours.  Simulect induction. His post-operative course was complicated by delayed graft function requiring TIW HD and PO Lasix 80mg BID.   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  Patient's creatinine had been improving as an outpatient but recently found to have a creatine of 5. US revealed some concern for TRAS, perinephric collection and hydronephrosis. Patient was sent to IR for retrograde nephrostogram and placement of nephrostomy tube with drainage of perinephric collection.  On arrival to IR patient was found to be hyperkalemic to 6.7.  Patient was admitted for medical management of hyperkalemia with procedure delayed. Denies any symptoms, no N/V/D, no CP, no SOB. 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

## 2018-09-14 NOTE — H&P ADULT - ASSESSMENT
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

## 2018-09-14 NOTE — CHART NOTE - NSCHARTNOTEFT_GEN_A_CORE
Pt seen and examined s/p percutaneous nephrostomy of transplanted kidney and drainage of perinephric fluid collection    Pt feels well, denies chest pain, shortness of breath, or abdominal pain.   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 complicated by DGF requiring HD, mild rejection treated with steroids, now s/p percutaneous nephrostomy  -urine from collecting system and perinephric fluid sent for microbiology, f/u cultures   -Resume renal diet   -continue antirejection medications   -Continue tacrolimus 5/5, check level   - Continue prednisone 5 mg  - Continue nystatin, bactrim, hold valcyte due to leukopenia on outpatient labs   - Patient will need Epclusa for HepC+ donor, will resume when patient brings medication from home  -ISS, POC glucose currently 80 s/p hyperkalemia treatment will monitor       Caity Hampton  [id number]

## 2018-09-15 DIAGNOSIS — D89.9 DISORDER INVOLVING THE IMMUNE MECHANISM, UNSPECIFIED: ICD-10-CM

## 2018-09-15 DIAGNOSIS — I10 ESSENTIAL (PRIMARY) HYPERTENSION: ICD-10-CM

## 2018-09-15 LAB
ANION GAP SERPL CALC-SCNC: 13 MMOL/L — SIGNIFICANT CHANGE UP (ref 5–17)
ANION GAP SERPL CALC-SCNC: 9 MMOL/L — SIGNIFICANT CHANGE UP (ref 5–17)
BUN SERPL-MCNC: 44 MG/DL — HIGH (ref 7–23)
BUN SERPL-MCNC: 45 MG/DL — HIGH (ref 7–23)
CALCIUM SERPL-MCNC: 9.4 MG/DL — SIGNIFICANT CHANGE UP (ref 8.4–10.5)
CALCIUM SERPL-MCNC: 9.8 MG/DL — SIGNIFICANT CHANGE UP (ref 8.4–10.5)
CHLORIDE SERPL-SCNC: 109 MMOL/L — HIGH (ref 96–108)
CHLORIDE SERPL-SCNC: 109 MMOL/L — HIGH (ref 96–108)
CO2 SERPL-SCNC: 18 MMOL/L — LOW (ref 22–31)
CO2 SERPL-SCNC: 21 MMOL/L — LOW (ref 22–31)
CREAT SERPL-MCNC: 3.84 MG/DL — HIGH (ref 0.5–1.3)
CREAT SERPL-MCNC: 4.62 MG/DL — HIGH (ref 0.5–1.3)
CULTURE RESULTS: NO GROWTH — SIGNIFICANT CHANGE UP
GLUCOSE BLDC GLUCOMTR-MCNC: 161 MG/DL — HIGH (ref 70–99)
GLUCOSE BLDC GLUCOMTR-MCNC: 175 MG/DL — HIGH (ref 70–99)
GLUCOSE BLDC GLUCOMTR-MCNC: 189 MG/DL — HIGH (ref 70–99)
GLUCOSE BLDC GLUCOMTR-MCNC: 264 MG/DL — HIGH (ref 70–99)
GLUCOSE SERPL-MCNC: 148 MG/DL — HIGH (ref 70–99)
GLUCOSE SERPL-MCNC: 165 MG/DL — HIGH (ref 70–99)
GRAM STN FLD: SIGNIFICANT CHANGE UP
HCT VFR BLD CALC: 27 % — LOW (ref 39–50)
HGB BLD-MCNC: 8.7 G/DL — LOW (ref 13–17)
MAGNESIUM SERPL-MCNC: 1.7 MG/DL — SIGNIFICANT CHANGE UP (ref 1.6–2.6)
MCHC RBC-ENTMCNC: 32 GM/DL — SIGNIFICANT CHANGE UP (ref 32–36)
MCHC RBC-ENTMCNC: 32 PG — SIGNIFICANT CHANGE UP (ref 27–34)
MCV RBC AUTO: 100 FL — SIGNIFICANT CHANGE UP (ref 80–100)
PHOSPHATE SERPL-MCNC: 3.4 MG/DL — SIGNIFICANT CHANGE UP (ref 2.5–4.5)
PLATELET # BLD AUTO: 128 K/UL — LOW (ref 150–400)
POTASSIUM SERPL-MCNC: 5.2 MMOL/L — SIGNIFICANT CHANGE UP (ref 3.5–5.3)
POTASSIUM SERPL-MCNC: 5.3 MMOL/L — SIGNIFICANT CHANGE UP (ref 3.5–5.3)
POTASSIUM SERPL-SCNC: 5.2 MMOL/L — SIGNIFICANT CHANGE UP (ref 3.5–5.3)
POTASSIUM SERPL-SCNC: 5.3 MMOL/L — SIGNIFICANT CHANGE UP (ref 3.5–5.3)
RBC # BLD: 2.7 M/UL — LOW (ref 4.2–5.8)
RBC # FLD: 17.3 % — HIGH (ref 10.3–14.5)
SODIUM SERPL-SCNC: 139 MMOL/L — SIGNIFICANT CHANGE UP (ref 135–145)
SODIUM SERPL-SCNC: 140 MMOL/L — SIGNIFICANT CHANGE UP (ref 135–145)
SPECIMEN SOURCE: SIGNIFICANT CHANGE UP
SPECIMEN SOURCE: SIGNIFICANT CHANGE UP
TACROLIMUS SERPL-MCNC: 11.1 NG/ML — SIGNIFICANT CHANGE UP
WBC # BLD: 2.4 K/UL — LOW (ref 3.8–10.5)
WBC # FLD AUTO: 2.4 K/UL — LOW (ref 3.8–10.5)

## 2018-09-15 PROCEDURE — 99232 SBSQ HOSP IP/OBS MODERATE 35: CPT

## 2018-09-15 RX ORDER — MAGNESIUM SULFATE 500 MG/ML
2 VIAL (ML) INJECTION ONCE
Qty: 0 | Refills: 0 | Status: COMPLETED | OUTPATIENT
Start: 2018-09-15 | End: 2018-09-15

## 2018-09-15 RX ORDER — ERYTHROPOIETIN 10000 [IU]/ML
10000 INJECTION, SOLUTION INTRAVENOUS; SUBCUTANEOUS ONCE
Qty: 0 | Refills: 0 | Status: COMPLETED | OUTPATIENT
Start: 2018-09-15 | End: 2018-09-15

## 2018-09-15 RX ORDER — SODIUM CHLORIDE 9 MG/ML
1000 INJECTION INTRAMUSCULAR; INTRAVENOUS; SUBCUTANEOUS
Qty: 0 | Refills: 0 | Status: DISCONTINUED | OUTPATIENT
Start: 2018-09-15 | End: 2018-09-15

## 2018-09-15 RX ORDER — TACROLIMUS 5 MG/1
4 CAPSULE ORAL
Qty: 0 | Refills: 0 | Status: DISCONTINUED | OUTPATIENT
Start: 2018-09-15 | End: 2018-09-21

## 2018-09-15 RX ADMIN — Medication 500000 UNIT(S): at 23:16

## 2018-09-15 RX ADMIN — Medication 5 MILLIGRAM(S): at 06:15

## 2018-09-15 RX ADMIN — TACROLIMUS 5 MILLIGRAM(S): 5 CAPSULE ORAL at 06:15

## 2018-09-15 RX ADMIN — TACROLIMUS 4 MILLIGRAM(S): 5 CAPSULE ORAL at 18:02

## 2018-09-15 RX ADMIN — Medication 50 GRAM(S): at 16:06

## 2018-09-15 RX ADMIN — TAMSULOSIN HYDROCHLORIDE 0.4 MILLIGRAM(S): 0.4 CAPSULE ORAL at 00:26

## 2018-09-15 RX ADMIN — FAMOTIDINE 20 MILLIGRAM(S): 10 INJECTION INTRAVENOUS at 11:48

## 2018-09-15 RX ADMIN — Medication 500000 UNIT(S): at 11:47

## 2018-09-15 RX ADMIN — Medication 1: at 16:55

## 2018-09-15 RX ADMIN — Medication 1: at 08:05

## 2018-09-15 RX ADMIN — GABAPENTIN 300 MILLIGRAM(S): 400 CAPSULE ORAL at 11:47

## 2018-09-15 RX ADMIN — Medication 500000 UNIT(S): at 00:26

## 2018-09-15 RX ADMIN — TAMSULOSIN HYDROCHLORIDE 0.4 MILLIGRAM(S): 0.4 CAPSULE ORAL at 21:54

## 2018-09-15 RX ADMIN — SODIUM CHLORIDE 70 MILLILITER(S): 9 INJECTION INTRAMUSCULAR; INTRAVENOUS; SUBCUTANEOUS at 10:08

## 2018-09-15 RX ADMIN — Medication 1 TABLET(S): at 11:47

## 2018-09-15 RX ADMIN — MYCOPHENOLATE MOFETIL 500 MILLIGRAM(S): 250 CAPSULE ORAL at 06:12

## 2018-09-15 RX ADMIN — MYCOPHENOLATE MOFETIL 500 MILLIGRAM(S): 250 CAPSULE ORAL at 18:03

## 2018-09-15 RX ADMIN — Medication 500000 UNIT(S): at 18:03

## 2018-09-15 RX ADMIN — Medication 3: at 11:47

## 2018-09-15 RX ADMIN — ERYTHROPOIETIN 10000 UNIT(S): 10000 INJECTION, SOLUTION INTRAVENOUS; SUBCUTANEOUS at 18:50

## 2018-09-15 RX ADMIN — Medication 500000 UNIT(S): at 06:17

## 2018-09-15 RX ADMIN — Medication 100 MILLIGRAM(S): at 11:48

## 2018-09-15 NOTE — PROGRESS NOTE ADULT - SUBJECTIVE AND OBJECTIVE BOX
Transplant Surgery - Multidisciplinary Rounds  --------------------------------------------------------------  DDRT 7/17/18    Present: Patient seen with multidisciplinary team (Surgeon, Nephrologist, NP, resident MD)  in am rounds and examined with Dr. Yao.   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 CIT 22 hours.  Simulect induction. His post-operative course was complicated by delayed graft function requiring TIW HD and PO Lasix 80mg BID.   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  Patient's creatinine had been improving as an outpatient but recently found to have a creatine of 5. US revealed some concern for TRAS, perinephric collection and hydronephrosis. Patient was sent to IR for retrograde nephrostogram and placement of nephrostomy tube with drainage of perinephric collection.  On arrival to IR patient was found to be hyperkalemic to 6.7.  Patient was admitted for medical management of hyperkalemia with procedure delayed. Denies any symptoms, no N/V/D, no CP, no SOB. 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     24 hours events:  Hyperkalemia improved to 5.3 following insulin, dextrose, lasix US/Fluoro guided transplant percutaneous nephrostomy performed. 8 fr drain placed. Mod-sever hydronephrosis. Clear yellow/pink urine aspirated from the renal collecting system. Drain placed to bag. US guided aspiration of perinephric fluid performed. Appx 75cc of dark yellow fluid aspirated.       Potential Discharge date: 9/15    Education: nephrostomy tube management    Plan of care: See below    MEDICATIONS  (STANDING):  allopurinol 100 milliGRAM(s) Oral daily  dextrose 5%. 1000 milliLiter(s) (50 mL/Hr) IV Continuous <Continuous>  dextrose 50% Injectable 12.5 Gram(s) IV Push once  dextrose 50% Injectable 25 Gram(s) IV Push once  dextrose 50% Injectable 25 Gram(s) IV Push once  Epclusa (Sofosbuvir 400 and Velpatasvir) 1 Tablet(s) 1 Tablet(s) Oral daily  famotidine    Tablet 20 milliGRAM(s) Oral daily  gabapentin 300 milliGRAM(s) Oral daily  influenza   Vaccine 0.5 milliLiter(s) IntraMuscular once  insulin lispro (HumaLOG) corrective regimen sliding scale   SubCutaneous three times a day before meals  insulin lispro (HumaLOG) corrective regimen sliding scale   SubCutaneous at bedtime  mycophenolate mofetil 500 milliGRAM(s) Oral two times a day  nystatin    Suspension 256866 Unit(s) Oral four times a day  predniSONE   Tablet 5 milliGRAM(s) Oral daily  sitaGLIPtin 25 milliGRAM(s) Oral daily  sodium chloride 0.9%. 1000 milliLiter(s) (70 mL/Hr) IV Continuous <Continuous>  tacrolimus 5 milliGRAM(s) Oral two times a day  tamsulosin 0.4 milliGRAM(s) Oral at bedtime  trimethoprim   80 mG/sulfamethoxazole 400 mG 1 Tablet(s) Oral daily    MEDICATIONS  (PRN):  dextrose 40% Gel 15 Gram(s) Oral once PRN Blood Glucose LESS THAN 70 milliGRAM(s)/deciliter  glucagon  Injectable 1 milliGRAM(s) IntraMuscular once PRN Glucose LESS THAN 70 milligrams/deciliter      PAST MEDICAL & SURGICAL HISTORY:  ESRD (end stage renal disease) on dialysis  Kidney cancer, primary, with metastasis from kidney to other site: with no mets  HTN (hypertension)  DM (diabetes mellitus)  S/p nephrectomy: right 12/10/2015  S/p nephrectomy: left 9/15/2015  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      Vital Signs Last 24 Hrs  T(C): 36.8 (15 Sep 2018 05:54), Max: 36.9 (14 Sep 2018 19:51)  T(F): 98.2 (15 Sep 2018 05:54), Max: 98.4 (14 Sep 2018 19:51)  HR: 85 (15 Sep 2018 05:54) (80 - 95)  BP: 125/71 (15 Sep 2018 05:54) (125/71 - 152/79)  BP(mean): --  RR: 16 (15 Sep 2018 05:54) (16 - 16)  SpO2: 98% (15 Sep 2018 05:54) (95% - 98%)    I&O's Summary    14 Sep 2018 07:01  -  15 Sep 2018 07:00  --------------------------------------------------------  IN: 720 mL / OUT: 1340 mL / NET: -620 mL    15 Sep 2018 07:01  -  15 Sep 2018 09:50  --------------------------------------------------------  IN: 480 mL / OUT: 150 mL / NET: 330 mL                              8.7    2.4   )-----------( 128      ( 15 Sep 2018 06:57 )             27.0     09-15    139  |  109<H>  |  45<H>  ----------------------------<  148<H>  5.2   |  21<L>  |  4.62<H>    Ca    9.8      15 Sep 2018 06:57  Phos  3.4     09-15  Mg     1.7     09-15    Review of systems  Gen: No weight changes, fatigue, fevers/chills, weakness  Skin: No rashes  Head/Eyes/Ears/Mouth: No headache; Normal hearing; Normal vision w/o blurriness; No sinus pain/discomfort, sore throat  Respiratory: No dyspnea, cough, wheezing, hemoptysis  CV: No chest pain, PND, orthopnea  GI: No abdominal pain, diarrhea, constipation, nausea, vomiting, melena, hematochezia  : No increased frequency, dysuria, hematuria, nocturia  MSK: No joint pain/swelling; no back pain; no edema  Neuro: No dizziness/lightheadedness, weakness, seizures, numbness, tingling  Heme: No easy bruising or bleeding  Endo: No heat/cold intolerance  Psych: No significant nervousness, anxiety, stress, depression  All other systems were reviewed and are negative, except as noted.    PHYSICAL EXAM:  Constitutional: Well developed / well nourished  Eyes: Anicteric, PERRLA  ENMT: nc/at  Neck: central line *****************  Respiratory: CTA B/L  Cardiovascular: RRR  Gastrointestinal: Soft abdomen, mild tender to touch at surgical site, ND  Genitourinary: Urinary catheter in place*****Voiding spontaneously  Extremities: SCD's in place and working bilaterally  Vascular: Palpable dp pulses bilaterally  Neurological: A&O x3  Skin: Mild serosanguinous azeem on wound dressing*******Wound open to air no erythema and evidence of infection noted  Musculoskeletal: Moving all extremities  Psychiatric: Responsive Transplant Surgery - Multidisciplinary Rounds  --------------------------------------------------------------  DDRT 7/17/18    Present: Patient seen with multidisciplinary team (Surgeon, Nephrologist, NP, resident MD)  in am rounds and examined with Dr. Yao.   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 CIT 22 hours.  Simulect induction. His post-operative course was complicated by delayed graft function requiring TIW HD and PO Lasix 80mg BID.   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  Patient's creatinine had been improving as an outpatient but recently found to have a creatine of 5. US revealed some concern for TRAS, perinephric collection and hydronephrosis. Patient was sent to IR for retrograde nephrostogram and placement of nephrostomy tube with drainage of perinephric collection.  On arrival to IR patient was found to be hyperkalemic to 6.7.  Patient was admitted for medical management of hyperkalemia with procedure delayed. Denies any symptoms, no N/V/D, no CP, no SOB. 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     24 hours events:  Hyperkalemia improved to 5.3 following insulin, dextrose, lasix.  US/Fluoro guided transplant percutaneous nephrostomy performed. 8 fr drain placed. Mod-sever hydronephrosis. Clear yellow/pink urine aspirated from the renal collecting system. Drain placed to bag. US guided aspiration of perinephric fluid performed. Appx 75cc of dark yellow fluid aspirated.  No complaints of pain/N/V.       Potential Discharge date: 9/17    Education: nephrostomy tube management    Plan of care: See below    MEDICATIONS  (STANDING):  allopurinol 100 milliGRAM(s) Oral daily  dextrose 5%. 1000 milliLiter(s) (50 mL/Hr) IV Continuous <Continuous>  dextrose 50% Injectable 12.5 Gram(s) IV Push once  dextrose 50% Injectable 25 Gram(s) IV Push once  dextrose 50% Injectable 25 Gram(s) IV Push once  Epclusa (Sofosbuvir 400 and Velpatasvir) 1 Tablet(s) 1 Tablet(s) Oral daily  famotidine    Tablet 20 milliGRAM(s) Oral daily  gabapentin 300 milliGRAM(s) Oral daily  influenza   Vaccine 0.5 milliLiter(s) IntraMuscular once  insulin lispro (HumaLOG) corrective regimen sliding scale   SubCutaneous three times a day before meals  insulin lispro (HumaLOG) corrective regimen sliding scale   SubCutaneous at bedtime  mycophenolate mofetil 500 milliGRAM(s) Oral two times a day  nystatin    Suspension 101346 Unit(s) Oral four times a day  predniSONE   Tablet 5 milliGRAM(s) Oral daily  sitaGLIPtin 25 milliGRAM(s) Oral daily  sodium chloride 0.9%. 1000 milliLiter(s) (70 mL/Hr) IV Continuous <Continuous>  tacrolimus 5 milliGRAM(s) Oral two times a day  tamsulosin 0.4 milliGRAM(s) Oral at bedtime  trimethoprim   80 mG/sulfamethoxazole 400 mG 1 Tablet(s) Oral daily    MEDICATIONS  (PRN):  dextrose 40% Gel 15 Gram(s) Oral once PRN Blood Glucose LESS THAN 70 milliGRAM(s)/deciliter  glucagon  Injectable 1 milliGRAM(s) IntraMuscular once PRN Glucose LESS THAN 70 milligrams/deciliter      PAST MEDICAL & SURGICAL HISTORY:  ESRD (end stage renal disease) on dialysis  Kidney cancer, primary, with metastasis from kidney to other site: with no mets  HTN (hypertension)  DM (diabetes mellitus)  S/p nephrectomy: right 12/10/2015  S/p nephrectomy: left 9/15/2015  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      Vital Signs Last 24 Hrs  T(C): 36.8 (15 Sep 2018 05:54), Max: 36.9 (14 Sep 2018 19:51)  T(F): 98.2 (15 Sep 2018 05:54), Max: 98.4 (14 Sep 2018 19:51)  HR: 85 (15 Sep 2018 05:54) (80 - 95)  BP: 125/71 (15 Sep 2018 05:54) (125/71 - 152/79)  BP(mean): --  RR: 16 (15 Sep 2018 05:54) (16 - 16)  SpO2: 98% (15 Sep 2018 05:54) (95% - 98%)    I&O's Summary    14 Sep 2018 07:01  -  15 Sep 2018 07:00  --------------------------------------------------------  IN: 720 mL / OUT: 1340 mL / NET: -620 mL    15 Sep 2018 07:01  -  15 Sep 2018 09:50  --------------------------------------------------------  IN: 480 mL / OUT: 150 mL / NET: 330 mL                              8.7    2.4   )-----------( 128      ( 15 Sep 2018 06:57 )             27.0     09-15    139  |  109<H>  |  45<H>  ----------------------------<  148<H>  5.2   |  21<L>  |  4.62<H>    Ca    9.8      15 Sep 2018 06:57  Phos  3.4     09-15  Mg     1.7     09-15    Review of systems  Gen: No weight changes, fatigue, fevers/chills, weakness  Skin: No rashes  Head/Eyes/Ears/Mouth: No headache; Normal hearing; Normal vision w/o blurriness; No sinus pain/discomfort, sore throat  Respiratory: No dyspnea, cough, wheezing, hemoptysis  CV: No chest pain, PND, orthopnea  GI: No abdominal pain, diarrhea, constipation, nausea, vomiting, melena, hematochezia  : No increased frequency, dysuria, hematuria, nocturia  MSK: No joint pain/swelling; no back pain; no edema  Neuro: No dizziness/lightheadedness, weakness, seizures, numbness, tingling  Heme: No easy bruising or bleeding  Endo: No heat/cold intolerance  Psych: No significant nervousness, anxiety, stress, depression  All other systems were reviewed and are negative, except as noted.    PHYSICAL EXAM:  Constitutional: Well developed / well nourished  Eyes: Anicteric, PERRLA  ENMT: nc/at  Neck: supple  Respiratory: CTA B/L  Cardiovascular: RRR  Gastrointestinal: Soft abdomen, NT, ND  Genitourinary: Voiding spontaneously. Nephrostomy drain in situ with pink colored uop  Extremities: SCD's in place and working bilaterally  Vascular: Palpable dp pulses bilaterally  Neurological: A&O x3  Skin: Wound well healed   Musculoskeletal: Moving all extremities  Psychiatric: Responsive

## 2018-09-15 NOTE — PROGRESS NOTE ADULT - SUBJECTIVE AND OBJECTIVE BOX
[facility] DIVISION OF KIDNEY DISEASES AND HYPERTENSION -- FOLLOW UP NOTE  --------------------------------------------------------------------------------  Chief Complaint:  hydronephrosis    24 hour events/subjective:  s/p nephrostomy last night.  making urine with nephrostomy and urine      PAST HISTORY  --------------------------------------------------------------------------------  No significant changes to PMH, PSH, FHx, SHx, unless otherwise noted    ALLERGIES & MEDICATIONS  --------------------------------------------------------------------------------  Allergies    No Known Allergies    Intolerances      Standing Inpatient Medications  allopurinol 100 milliGRAM(s) Oral daily  dextrose 5%. 1000 milliLiter(s) IV Continuous <Continuous>  dextrose 50% Injectable 12.5 Gram(s) IV Push once  dextrose 50% Injectable 25 Gram(s) IV Push once  dextrose 50% Injectable 25 Gram(s) IV Push once  Epclusa (Sofosbuvir 400 and Velpatasvir) 1 Tablet(s) 1 Tablet(s) Oral daily  epoetin moi Injectable 60421 Unit(s) IV Push once  famotidine    Tablet 20 milliGRAM(s) Oral daily  gabapentin 300 milliGRAM(s) Oral daily  influenza   Vaccine 0.5 milliLiter(s) IntraMuscular once  insulin lispro (HumaLOG) corrective regimen sliding scale   SubCutaneous three times a day before meals  insulin lispro (HumaLOG) corrective regimen sliding scale   SubCutaneous at bedtime  magnesium sulfate  IVPB 2 Gram(s) IV Intermittent once  mycophenolate mofetil 500 milliGRAM(s) Oral two times a day  nystatin    Suspension 661059 Unit(s) Oral four times a day  predniSONE   Tablet 5 milliGRAM(s) Oral daily  sitaGLIPtin 25 milliGRAM(s) Oral daily  sodium chloride 0.9%. 1000 milliLiter(s) IV Continuous <Continuous>  tacrolimus 5 milliGRAM(s) Oral two times a day  tamsulosin 0.4 milliGRAM(s) Oral at bedtime  trimethoprim   80 mG/sulfamethoxazole 400 mG 1 Tablet(s) Oral daily    PRN Inpatient Medications  dextrose 40% Gel 15 Gram(s) Oral once PRN  glucagon  Injectable 1 milliGRAM(s) IntraMuscular once PRN      REVIEW OF SYSTEMS  --------------------------------------------------------------------------------  Gen: No fatigue, fevers/chills, weakness  Skin: No rashes  Head/Eyes/Ears/Mouth: No headache;No sore throat  Respiratory: No dyspnea, cough,   CV: No chest pain, PND, orthopnea  GI: No abdominal pain, diarrhea, constipation, nausea, vomiting  Transplant: No pain  : No increased frequency, dysuria, hematuria, nocturia  MSK: No joint pain/swelling; no back pain; no edema  Neuro: No dizziness/lightheadedness, weakness, seizures, numbness, tingling  Psych: No significant nervousness, anxiety, stress, depression    All other systems were reviewed and are negative, except as noted.    VITALS/PHYSICAL EXAM  --------------------------------------------------------------------------------  T(C): 37.2 (09-15-18 @ 11:44), Max: 37.2 (09-15-18 @ 11:44)  HR: 85 (09-15-18 @ 11:44) (80 - 95)  BP: 136/75 (09-15-18 @ 11:44) (125/71 - 152/80)  RR: 20 (09-15-18 @ 11:44) (16 - 20)  SpO2: 100% (09-15-18 @ 11:44) (95% - 100%)  Wt(kg): --  Height (cm): 177.8 (09-14-18 @ 16:45)  Weight (kg): 93.8 (09-15-18 @ 05:54)  BMI (kg/m2): 29.7 (09-15-18 @ 05:54)  BSA (m2): 2.12 (09-15-18 @ 05:54)      09-14-18 @ 07:01  -  09-15-18 @ 07:00  --------------------------------------------------------  IN: 720 mL / OUT: 1340 mL / NET: -620 mL    09-15-18 @ 07:01  -  09-15-18 @ 14:32  --------------------------------------------------------  IN: 480 mL / OUT: 300 mL / NET: 180 mL      Physical Exam:  	Gen: NAD, well-appearing  	HEENT: PERRL, supple neck, clear oropharynx  	Pulm: CTA B/L  	CV: RRR, S1S2; no rub  	Back: No spinal or CVA tenderness; no sacral edema  	Abd: +BS, soft, nontender/nondistended                      Transplant: No tenderness, swelling  	: No suprapubic tenderness  	UE: Warm, FROM, intact strength; no edema; no asterixis  	LE: Warm, FROM, intact strength; no edema  	Neuro: No focal deficits, intact gait  	Psych: Normal affect and mood  	Skin: Warm, without rashes      LABS/STUDIES  --------------------------------------------------------------------------------              8.7    2.4   >-----------<  128      [09-15-18 @ 06:57]              27.0     139  |  109  |  45  ----------------------------<  148      [09-15-18 @ 06:57]  5.2   |  21  |  4.62        Ca     9.8     [09-15-18 @ 06:57]      Mg     1.7     [09-15-18 @ 06:57]      Phos  3.4     [09-15-18 @ 06:57]            Creatinine Trend:  SCr 4.62 [09-15 @ 06:57]  SCr 4.55 [09-14 @ 18:35]    Tacrolimus (), Serum: 11.1 ng/mL (09-15 @ 08:28)            Urinalysis - [09-14-18 @ 20:35]      Color Yellow / Appearance Clear / SG 1.014 / pH 6.0      Gluc Negative / Ketone Negative  / Bili Negative / Urobili Negative       Blood Moderate / Protein 100 / Leuk Est Negative / Nitrite Negative      RBC 16 / WBC 6 / Hyaline 2 / Gran  / Sq Epi  / Non Sq Epi 4 / Bacteria Negative      HbA1c 6.0      [07-17-18 @ 16:53]

## 2018-09-15 NOTE — PROGRESS NOTE ADULT - PROBLEM SELECTOR PLAN 2
Elevated creatinine and hydronephrosis noted on usg.  s/p 8Fr Nephrostomy tube placement  FU IR cultures  Strict I/Os  Daily BMP  Continue Allopurinol for uric acid crystals, Flomax, Pepcid  Continue Epclusa for HCV treatment Elevated creatinine and hydronephrosis noted on usg.  s/p 8Fr Nephrostomy tube placement  FU IR cultures  Strict I/Os  Start NS @70ml/hour  Daily BMP  Continue Allopurinol for uric acid crystals, Flomax, Pepcid  Continue Epclusa for HCV treatment

## 2018-09-16 LAB
ANION GAP SERPL CALC-SCNC: 10 MMOL/L — SIGNIFICANT CHANGE UP (ref 5–17)
BUN SERPL-MCNC: 40 MG/DL — HIGH (ref 7–23)
CALCIUM SERPL-MCNC: 9.9 MG/DL — SIGNIFICANT CHANGE UP (ref 8.4–10.5)
CHLORIDE SERPL-SCNC: 109 MMOL/L — HIGH (ref 96–108)
CO2 SERPL-SCNC: 19 MMOL/L — LOW (ref 22–31)
CREAT SERPL-MCNC: 4.13 MG/DL — HIGH (ref 0.5–1.3)
GLUCOSE BLDC GLUCOMTR-MCNC: 156 MG/DL — HIGH (ref 70–99)
GLUCOSE BLDC GLUCOMTR-MCNC: 165 MG/DL — HIGH (ref 70–99)
GLUCOSE BLDC GLUCOMTR-MCNC: 184 MG/DL — HIGH (ref 70–99)
GLUCOSE BLDC GLUCOMTR-MCNC: 207 MG/DL — HIGH (ref 70–99)
GLUCOSE SERPL-MCNC: 148 MG/DL — HIGH (ref 70–99)
HCT VFR BLD CALC: 28.2 % — LOW (ref 39–50)
HGB BLD-MCNC: 9.2 G/DL — LOW (ref 13–17)
MAGNESIUM SERPL-MCNC: 1.9 MG/DL — SIGNIFICANT CHANGE UP (ref 1.6–2.6)
MCHC RBC-ENTMCNC: 32.5 GM/DL — SIGNIFICANT CHANGE UP (ref 32–36)
MCHC RBC-ENTMCNC: 32.7 PG — SIGNIFICANT CHANGE UP (ref 27–34)
MCV RBC AUTO: 101 FL — HIGH (ref 80–100)
PHOSPHATE SERPL-MCNC: 2.9 MG/DL — SIGNIFICANT CHANGE UP (ref 2.5–4.5)
PLATELET # BLD AUTO: 136 K/UL — LOW (ref 150–400)
POTASSIUM SERPL-MCNC: 5.1 MMOL/L — SIGNIFICANT CHANGE UP (ref 3.5–5.3)
POTASSIUM SERPL-SCNC: 5.1 MMOL/L — SIGNIFICANT CHANGE UP (ref 3.5–5.3)
RBC # BLD: 2.8 M/UL — LOW (ref 4.2–5.8)
RBC # FLD: 17.4 % — HIGH (ref 10.3–14.5)
SODIUM SERPL-SCNC: 138 MMOL/L — SIGNIFICANT CHANGE UP (ref 135–145)
TACROLIMUS SERPL-MCNC: 11.2 NG/ML — SIGNIFICANT CHANGE UP
WBC # BLD: 2.4 K/UL — LOW (ref 3.8–10.5)
WBC # FLD AUTO: 2.4 K/UL — LOW (ref 3.8–10.5)

## 2018-09-16 PROCEDURE — 99232 SBSQ HOSP IP/OBS MODERATE 35: CPT

## 2018-09-16 RX ORDER — MAGNESIUM SULFATE 500 MG/ML
1 VIAL (ML) INJECTION ONCE
Qty: 0 | Refills: 0 | Status: COMPLETED | OUTPATIENT
Start: 2018-09-16 | End: 2018-09-16

## 2018-09-16 RX ADMIN — Medication 1: at 08:30

## 2018-09-16 RX ADMIN — Medication 2: at 16:57

## 2018-09-16 RX ADMIN — Medication 100 MILLIGRAM(S): at 12:25

## 2018-09-16 RX ADMIN — FAMOTIDINE 20 MILLIGRAM(S): 10 INJECTION INTRAVENOUS at 12:25

## 2018-09-16 RX ADMIN — TACROLIMUS 4 MILLIGRAM(S): 5 CAPSULE ORAL at 06:13

## 2018-09-16 RX ADMIN — MYCOPHENOLATE MOFETIL 500 MILLIGRAM(S): 250 CAPSULE ORAL at 06:09

## 2018-09-16 RX ADMIN — Medication 500000 UNIT(S): at 18:15

## 2018-09-16 RX ADMIN — MYCOPHENOLATE MOFETIL 500 MILLIGRAM(S): 250 CAPSULE ORAL at 18:15

## 2018-09-16 RX ADMIN — Medication 100 GRAM(S): at 08:30

## 2018-09-16 RX ADMIN — Medication 500000 UNIT(S): at 12:25

## 2018-09-16 RX ADMIN — TAMSULOSIN HYDROCHLORIDE 0.4 MILLIGRAM(S): 0.4 CAPSULE ORAL at 22:08

## 2018-09-16 RX ADMIN — GABAPENTIN 300 MILLIGRAM(S): 400 CAPSULE ORAL at 12:25

## 2018-09-16 RX ADMIN — TACROLIMUS 4 MILLIGRAM(S): 5 CAPSULE ORAL at 18:15

## 2018-09-16 RX ADMIN — Medication 500000 UNIT(S): at 06:09

## 2018-09-16 RX ADMIN — Medication 1: at 12:25

## 2018-09-16 RX ADMIN — Medication 1 TABLET(S): at 12:25

## 2018-09-16 RX ADMIN — Medication 5 MILLIGRAM(S): at 06:09

## 2018-09-16 RX ADMIN — Medication 500000 UNIT(S): at 23:50

## 2018-09-16 NOTE — PROGRESS NOTE ADULT - SUBJECTIVE AND OBJECTIVE BOX
[facility] DIVISION OF KIDNEY DISEASES AND HYPERTENSION -- FOLLOW UP NOTE  --------------------------------------------------------------------------------  Chief Complaint:  CHELA    24 hour events/subjective:  making urine via nephrostomy and bladder. feels ok      PAST HISTORY  --------------------------------------------------------------------------------  No significant changes to PMH, PSH, FHx, SHx, unless otherwise noted    ALLERGIES & MEDICATIONS  --------------------------------------------------------------------------------  Allergies    No Known Allergies    Intolerances      Standing Inpatient Medications  allopurinol 100 milliGRAM(s) Oral daily  dextrose 5%. 1000 milliLiter(s) IV Continuous <Continuous>  dextrose 50% Injectable 12.5 Gram(s) IV Push once  dextrose 50% Injectable 25 Gram(s) IV Push once  dextrose 50% Injectable 25 Gram(s) IV Push once  Epclusa (Sofosbuvir 400 and Velpatasvir) 1 Tablet(s) 1 Tablet(s) Oral daily  famotidine    Tablet 20 milliGRAM(s) Oral daily  gabapentin 300 milliGRAM(s) Oral daily  influenza   Vaccine 0.5 milliLiter(s) IntraMuscular once  insulin lispro (HumaLOG) corrective regimen sliding scale   SubCutaneous three times a day before meals  insulin lispro (HumaLOG) corrective regimen sliding scale   SubCutaneous at bedtime  mycophenolate mofetil 500 milliGRAM(s) Oral two times a day  nystatin    Suspension 717865 Unit(s) Oral four times a day  predniSONE   Tablet 5 milliGRAM(s) Oral daily  sitaGLIPtin 25 milliGRAM(s) Oral daily  tacrolimus 4 milliGRAM(s) Oral two times a day  tamsulosin 0.4 milliGRAM(s) Oral at bedtime  trimethoprim   80 mG/sulfamethoxazole 400 mG 1 Tablet(s) Oral daily    PRN Inpatient Medications  dextrose 40% Gel 15 Gram(s) Oral once PRN  glucagon  Injectable 1 milliGRAM(s) IntraMuscular once PRN      REVIEW OF SYSTEMS  --------------------------------------------------------------------------------  Gen: No fatigue, fevers/chills, weakness  Skin: No rashes  Head/Eyes/Ears/Mouth: No headache;No sore throat  Respiratory: No dyspnea, cough,   CV: No chest pain, PND, orthopnea  GI: No abdominal pain, diarrhea, constipation, nausea, vomiting  Transplant: No pain  : No increased frequency, dysuria, hematuria, nocturia  MSK: No joint pain/swelling; no back pain; no edema  Neuro: No dizziness/lightheadedness, weakness, seizures, numbness, tingling  Psych: No significant nervousness, anxiety, stress, depression    All other systems were reviewed and are negative, except as noted.    VITALS/PHYSICAL EXAM  --------------------------------------------------------------------------------  T(C): 36.7 (09-16-18 @ 11:39), Max: 36.9 (09-15-18 @ 16:05)  HR: 93 (09-16-18 @ 11:39) (72 - 93)  BP: 109/67 (09-16-18 @ 11:39) (109/67 - 160/83)  RR: 20 (09-16-18 @ 11:39) (18 - 20)  SpO2: 97% (09-16-18 @ 11:39) (96% - 100%)  Wt(kg): --  Height (cm): 177.8 (09-14-18 @ 16:45)  Weight (kg): 93.8 (09-15-18 @ 05:54)  BMI (kg/m2): 29.7 (09-15-18 @ 05:54)  BSA (m2): 2.12 (09-15-18 @ 05:54)      09-15-18 @ 07:01  -  09-16-18 @ 07:00  --------------------------------------------------------  IN: 840 mL / OUT: 1450 mL / NET: -610 mL    09-16-18 @ 07:01  -  09-16-18 @ 13:01  --------------------------------------------------------  IN: 280 mL / OUT: 450 mL / NET: -170 mL      Physical Exam:  	Gen: NAD, well-appearing  	HEENT: PERRL, supple neck, clear oropharynx  	Pulm: CTA B/L  	CV: RRR, S1S2; no rub  	Back: No spinal or CVA tenderness; no sacral edema  	Abd: +BS, soft, nontender/nondistended                      Transplant: No tenderness, swelling, nephrostomy in place  	: No suprapubic tenderness  	UE: Warm, FROM, intact strength; no edema; no asterixis  	LE: Warm, FROM, intact strength; no edema  	Neuro: No focal deficits, intact gait  	Psych: Normal affect and mood  	Skin: Warm, without rashes      LABS/STUDIES  --------------------------------------------------------------------------------              9.2    2.4   >-----------<  136      [09-16-18 @ 07:13]              28.2     138  |  109  |  40  ----------------------------<  148      [09-16-18 @ 07:03]  5.1   |  19  |  4.13        Ca     9.9     [09-16-18 @ 07:03]      Mg     1.9     [09-16-18 @ 07:03]      Phos  2.9     [09-16-18 @ 07:03]            Creatinine Trend:  SCr 4.13 [09-16 @ 07:03]  SCr 3.84 [09-15 @ 17:41]  SCr 4.62 [09-15 @ 06:57]  SCr 4.55 [09-14 @ 18:35]    Tacrolimus (), Serum: 11.2 ng/mL (09-16 @ 09:28)  Tacrolimus (), Serum: 11.1 ng/mL (09-15 @ 08:28)            Urinalysis - [09-14-18 @ 20:35]      Color Yellow / Appearance Clear / SG 1.014 / pH 6.0      Gluc Negative / Ketone Negative  / Bili Negative / Urobili Negative       Blood Moderate / Protein 100 / Leuk Est Negative / Nitrite Negative      RBC 16 / WBC 6 / Hyaline 2 / Gran  / Sq Epi  / Non Sq Epi 4 / Bacteria Negative      HbA1c 6.0      [07-17-18 @ 16:53]

## 2018-09-16 NOTE — PROGRESS NOTE ADULT - SUBJECTIVE AND OBJECTIVE BOX
Transplant Surgery - Multidisciplinary Rounds  --------------------------------------------------------------  DDRT 7/17/18    Present: Patient seen with multidisciplinary team (Surgeon, Nephrologist, NP, resident MD)  in am rounds and examined with Dr. Yao.   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 CIT 22 hours.  Simulect induction. His post-operative course was complicated by delayed graft function requiring TIW HD and PO Lasix 80mg BID.   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  Patient's creatinine had been improving as an outpatient but recently found to have a creatine of 5. US revealed some concern for TRAS, perinephric collection and hydronephrosis. Patient was sent to IR for retrograde nephrostogram and placement of nephrostomy tube with drainage of perinephric collection.  On arrival to IR patient was found to be hyperkalemic to 6.7.  Patient was admitted for medical management of hyperkalemia with procedure delayed. Denies any symptoms, no N/V/D, no CP, no SOB. 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     24 hours events:   No acute events overnight. Nephrostomy tube draining clear pink-tinged urine. Denies complaints of pain. Received 1 dose Procrit for anemia    Potential Discharge date: 9/17    Education: nephrostomy tube management    Plan of care: See below      MEDICATIONS  (STANDING):  allopurinol 100 milliGRAM(s) Oral daily  dextrose 5%. 1000 milliLiter(s) (50 mL/Hr) IV Continuous <Continuous>  dextrose 50% Injectable 12.5 Gram(s) IV Push once  dextrose 50% Injectable 25 Gram(s) IV Push once  dextrose 50% Injectable 25 Gram(s) IV Push once  Epclusa (Sofosbuvir 400 and Velpatasvir) 1 Tablet(s) 1 Tablet(s) Oral daily  famotidine    Tablet 20 milliGRAM(s) Oral daily  gabapentin 300 milliGRAM(s) Oral daily  influenza   Vaccine 0.5 milliLiter(s) IntraMuscular once  insulin lispro (HumaLOG) corrective regimen sliding scale   SubCutaneous three times a day before meals  insulin lispro (HumaLOG) corrective regimen sliding scale   SubCutaneous at bedtime  mycophenolate mofetil 500 milliGRAM(s) Oral two times a day  nystatin    Suspension 147633 Unit(s) Oral four times a day  predniSONE   Tablet 5 milliGRAM(s) Oral daily  sitaGLIPtin 25 milliGRAM(s) Oral daily  tacrolimus 4 milliGRAM(s) Oral two times a day  tamsulosin 0.4 milliGRAM(s) Oral at bedtime  trimethoprim   80 mG/sulfamethoxazole 400 mG 1 Tablet(s) Oral daily    MEDICATIONS  (PRN):  dextrose 40% Gel 15 Gram(s) Oral once PRN Blood Glucose LESS THAN 70 milliGRAM(s)/deciliter  glucagon  Injectable 1 milliGRAM(s) IntraMuscular once PRN Glucose LESS THAN 70 milligrams/deciliter      PAST MEDICAL & SURGICAL HISTORY:  ESRD (end stage renal disease) on dialysis  Kidney cancer, primary, with metastasis from kidney to other site: with no mets  HTN (hypertension)  DM (diabetes mellitus)  S/p nephrectomy: right 12/10/2015  S/p nephrectomy: left 9/15/2015  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      Vital Signs Last 24 Hrs  T(C): 36.6 (16 Sep 2018 05:18), Max: 37.2 (15 Sep 2018 11:44)  T(F): 97.8 (16 Sep 2018 05:18), Max: 99 (15 Sep 2018 11:44)  HR: 81 (16 Sep 2018 05:18) (72 - 85)  BP: 160/83 (16 Sep 2018 05:18) (121/65 - 160/83)  BP(mean): --  RR: 18 (16 Sep 2018 05:18) (18 - 20)  SpO2: 100% (16 Sep 2018 05:18) (96% - 100%)    I&O's Summary    15 Sep 2018 07:01  -  16 Sep 2018 07:00  --------------------------------------------------------  IN: 840 mL / OUT: 1450 mL / NET: -610 mL    16 Sep 2018 07:01  -  16 Sep 2018 10:22  --------------------------------------------------------  IN: 0 mL / OUT: 200 mL / NET: -200 mL                              9.2    2.4   )-----------( 136      ( 16 Sep 2018 07:13 )             28.2     09-16    138  |  109<H>  |  40<H>  ----------------------------<  148<H>  5.1   |  19<L>  |  4.13<H>    Ca    9.9      16 Sep 2018 07:03  Phos  2.9     09-16  Mg     1.9     09-16      Tacrolimus (), Serum: 11.1 ng/mL (09-15 @ 08:28)    9/14 IR PROCEDURE:  US/Fluoro guided transplant percutaneous nephrostomy:  8 fr drain placed. Mod-sever hydronephrosis. Clear yellow/pink urine aspirated from the renal collecting system. Drain placed to bag. US guided aspiration of perinephric fluid performed. Appx 75cc of dark yellow fluid aspirated.       Review of systems  Gen: No weight changes, fatigue, fevers/chills, weakness  Skin: No rashes  Head/Eyes/Ears/Mouth: No headache; Normal hearing; Normal vision w/o blurriness; No sinus pain/discomfort, sore throat  Respiratory: No dyspnea, cough, wheezing, hemoptysis  CV: No chest pain, PND, orthopnea  GI: No abdominal pain, diarrhea, constipation, nausea, vomiting, melena, hematochezia  : No increased frequency, dysuria, hematuria, nocturia  MSK: No joint pain/swelling; no back pain; no edema  Neuro: No dizziness/lightheadedness, weakness, seizures, numbness, tingling  Heme: No easy bruising or bleeding  Endo: No heat/cold intolerance  Psych: No significant nervousness, anxiety, stress, depression  All other systems were reviewed and are negative, except as noted.    PHYSICAL EXAM:  Constitutional: Well developed / well nourished  Eyes: Anicteric, PERRLA  ENMT: nc/at  Neck: supple  Respiratory: CTA B/L  Cardiovascular: RRR  Gastrointestinal: Soft abdomen, NT, ND  Genitourinary: Voiding spontaneously. Nephrostomy drain in situ with pink colored urine  Extremities: SCD's in place and working bilaterally  Vascular: Palpable dp pulses bilaterally  Neurological: A&O x3  Skin: Wound well healed   Musculoskeletal: Moving all extremities  Psychiatric: Responsive

## 2018-09-16 NOTE — PROGRESS NOTE ADULT - PROBLEM SELECTOR PLAN 2
Continue Nystatin, Bactrim, Tacrolimus, Cellcept 500 BID and Prednisone  Valcyte on hold due to leukopenia.  Will follow  Daily Tacrolimus levels.

## 2018-09-17 ENCOUNTER — TRANSCRIPTION ENCOUNTER (OUTPATIENT)
Age: 68
End: 2018-09-17

## 2018-09-17 LAB
ANION GAP SERPL CALC-SCNC: 10 MMOL/L — SIGNIFICANT CHANGE UP (ref 5–17)
BUN SERPL-MCNC: 34 MG/DL — HIGH (ref 7–23)
BUN SERPL-MCNC: 35 MG/DL — HIGH (ref 7–23)
BUN SERPL-MCNC: 36 MG/DL — HIGH (ref 7–23)
CALCIUM SERPL-MCNC: 10.1 MG/DL — SIGNIFICANT CHANGE UP (ref 8.4–10.5)
CALCIUM SERPL-MCNC: 9.6 MG/DL — SIGNIFICANT CHANGE UP (ref 8.4–10.5)
CALCIUM SERPL-MCNC: 9.8 MG/DL — SIGNIFICANT CHANGE UP (ref 8.4–10.5)
CHLORIDE SERPL-SCNC: 107 MMOL/L — SIGNIFICANT CHANGE UP (ref 96–108)
CHLORIDE SERPL-SCNC: 107 MMOL/L — SIGNIFICANT CHANGE UP (ref 96–108)
CHLORIDE SERPL-SCNC: 109 MMOL/L — HIGH (ref 96–108)
CMV DNA CSF QL NAA+PROBE: SIGNIFICANT CHANGE UP
CO2 SERPL-SCNC: 18 MMOL/L — LOW (ref 22–31)
CO2 SERPL-SCNC: 19 MMOL/L — LOW (ref 22–31)
CO2 SERPL-SCNC: 20 MMOL/L — LOW (ref 22–31)
CREAT SERPL-MCNC: 3.39 MG/DL — HIGH (ref 0.5–1.3)
CREAT SERPL-MCNC: 3.8 MG/DL — HIGH (ref 0.5–1.3)
CREAT SERPL-MCNC: 4 MG/DL — HIGH (ref 0.5–1.3)
GLUCOSE BLDC GLUCOMTR-MCNC: 128 MG/DL — HIGH (ref 70–99)
GLUCOSE BLDC GLUCOMTR-MCNC: 129 MG/DL — HIGH (ref 70–99)
GLUCOSE BLDC GLUCOMTR-MCNC: 138 MG/DL — HIGH (ref 70–99)
GLUCOSE BLDC GLUCOMTR-MCNC: 146 MG/DL — HIGH (ref 70–99)
GLUCOSE BLDC GLUCOMTR-MCNC: 147 MG/DL — HIGH (ref 70–99)
GLUCOSE BLDC GLUCOMTR-MCNC: 181 MG/DL — HIGH (ref 70–99)
GLUCOSE SERPL-MCNC: 111 MG/DL — HIGH (ref 70–99)
GLUCOSE SERPL-MCNC: 118 MG/DL — HIGH (ref 70–99)
GLUCOSE SERPL-MCNC: 125 MG/DL — HIGH (ref 70–99)
HCT VFR BLD CALC: 25.9 % — LOW (ref 39–50)
HGB BLD-MCNC: 8.7 G/DL — LOW (ref 13–17)
MAGNESIUM SERPL-MCNC: 1.8 MG/DL — SIGNIFICANT CHANGE UP (ref 1.6–2.6)
MAGNESIUM SERPL-MCNC: 1.9 MG/DL — SIGNIFICANT CHANGE UP (ref 1.6–2.6)
MCHC RBC-ENTMCNC: 33.7 GM/DL — SIGNIFICANT CHANGE UP (ref 32–36)
MCHC RBC-ENTMCNC: 33.9 PG — SIGNIFICANT CHANGE UP (ref 27–34)
MCV RBC AUTO: 101 FL — HIGH (ref 80–100)
PHOSPHATE SERPL-MCNC: 3 MG/DL — SIGNIFICANT CHANGE UP (ref 2.5–4.5)
PHOSPHATE SERPL-MCNC: 3 MG/DL — SIGNIFICANT CHANGE UP (ref 2.5–4.5)
PLATELET # BLD AUTO: 133 K/UL — LOW (ref 150–400)
POTASSIUM SERPL-MCNC: 5.4 MMOL/L — HIGH (ref 3.5–5.3)
POTASSIUM SERPL-MCNC: 6.1 MMOL/L — HIGH (ref 3.5–5.3)
POTASSIUM SERPL-MCNC: 6.1 MMOL/L — HIGH (ref 3.5–5.3)
POTASSIUM SERPL-SCNC: 5.4 MMOL/L — HIGH (ref 3.5–5.3)
POTASSIUM SERPL-SCNC: 6.1 MMOL/L — HIGH (ref 3.5–5.3)
POTASSIUM SERPL-SCNC: 6.1 MMOL/L — HIGH (ref 3.5–5.3)
RBC # BLD: 2.57 M/UL — LOW (ref 4.2–5.8)
RBC # FLD: 17.5 % — HIGH (ref 10.3–14.5)
SODIUM SERPL-SCNC: 135 MMOL/L — SIGNIFICANT CHANGE UP (ref 135–145)
SODIUM SERPL-SCNC: 137 MMOL/L — SIGNIFICANT CHANGE UP (ref 135–145)
SODIUM SERPL-SCNC: 138 MMOL/L — SIGNIFICANT CHANGE UP (ref 135–145)
TACROLIMUS SERPL-MCNC: 8.8 NG/ML — SIGNIFICANT CHANGE UP
WBC # BLD: 2.9 K/UL — LOW (ref 3.8–10.5)
WBC # FLD AUTO: 2.9 K/UL — LOW (ref 3.8–10.5)

## 2018-09-17 PROCEDURE — 50431 NJX PX NFROSGRM &/URTRGRM: CPT | Mod: RT

## 2018-09-17 PROCEDURE — 99232 SBSQ HOSP IP/OBS MODERATE 35: CPT

## 2018-09-17 RX ORDER — SODIUM POLYSTYRENE SULFONATE 4.1 MEQ/G
30 POWDER, FOR SUSPENSION ORAL ONCE
Qty: 0 | Refills: 0 | Status: COMPLETED | OUTPATIENT
Start: 2018-09-17 | End: 2018-09-17

## 2018-09-17 RX ORDER — SODIUM CHLORIDE 9 MG/ML
500 INJECTION INTRAMUSCULAR; INTRAVENOUS; SUBCUTANEOUS ONCE
Qty: 0 | Refills: 0 | Status: COMPLETED | OUTPATIENT
Start: 2018-09-17 | End: 2018-09-17

## 2018-09-17 RX ORDER — NYSTATIN 500MM UNIT
5 POWDER (EA) MISCELLANEOUS
Qty: 0 | Refills: 0 | DISCHARGE
Start: 2018-09-17

## 2018-09-17 RX ORDER — FUROSEMIDE 40 MG
60 TABLET ORAL ONCE
Qty: 0 | Refills: 0 | Status: COMPLETED | OUTPATIENT
Start: 2018-09-17 | End: 2018-09-17

## 2018-09-17 RX ORDER — FUROSEMIDE 40 MG
80 TABLET ORAL ONCE
Qty: 0 | Refills: 0 | Status: COMPLETED | OUTPATIENT
Start: 2018-09-17 | End: 2018-09-17

## 2018-09-17 RX ORDER — CIPROFLOXACIN LACTATE 400MG/40ML
500 VIAL (ML) INTRAVENOUS EVERY 12 HOURS
Qty: 0 | Refills: 0 | Status: COMPLETED | OUTPATIENT
Start: 2018-09-17 | End: 2018-09-19

## 2018-09-17 RX ORDER — INSULIN HUMAN 100 [IU]/ML
10 INJECTION, SOLUTION SUBCUTANEOUS ONCE
Qty: 0 | Refills: 0 | Status: COMPLETED | OUTPATIENT
Start: 2018-09-17 | End: 2018-09-17

## 2018-09-17 RX ORDER — DEXTROSE 50 % IN WATER 50 %
50 SYRINGE (ML) INTRAVENOUS ONCE
Qty: 0 | Refills: 0 | Status: COMPLETED | OUTPATIENT
Start: 2018-09-17 | End: 2018-09-17

## 2018-09-17 RX ORDER — ALLOPURINOL 300 MG
100 TABLET ORAL
Qty: 0 | Refills: 0 | COMMUNITY

## 2018-09-17 RX ORDER — SITAGLIPTIN 50 MG/1
1 TABLET, FILM COATED ORAL
Qty: 0 | Refills: 0 | COMMUNITY

## 2018-09-17 RX ORDER — TAMSULOSIN HYDROCHLORIDE 0.4 MG/1
1 CAPSULE ORAL
Qty: 30 | Refills: 0
Start: 2018-09-17

## 2018-09-17 RX ORDER — ALBUTEROL 90 UG/1
2.5 AEROSOL, METERED ORAL ONCE
Qty: 0 | Refills: 0 | Status: COMPLETED | OUTPATIENT
Start: 2018-09-17 | End: 2018-09-17

## 2018-09-17 RX ORDER — ALLOPURINOL 300 MG
1 TABLET ORAL
Qty: 0 | Refills: 0 | DISCHARGE
Start: 2018-09-17

## 2018-09-17 RX ORDER — GABAPENTIN 400 MG/1
1 CAPSULE ORAL
Qty: 0 | Refills: 0 | DISCHARGE
Start: 2018-09-17

## 2018-09-17 RX ORDER — SODIUM POLYSTYRENE SULFONATE 4.1 MEQ/G
30 POWDER, FOR SUSPENSION ORAL ONCE
Qty: 0 | Refills: 0 | Status: DISCONTINUED | OUTPATIENT
Start: 2018-09-17 | End: 2018-09-17

## 2018-09-17 RX ORDER — SITAGLIPTIN 50 MG/1
1 TABLET, FILM COATED ORAL
Qty: 0 | Refills: 0 | DISCHARGE
Start: 2018-09-17

## 2018-09-17 RX ADMIN — TACROLIMUS 4 MILLIGRAM(S): 5 CAPSULE ORAL at 06:03

## 2018-09-17 RX ADMIN — GABAPENTIN 300 MILLIGRAM(S): 400 CAPSULE ORAL at 11:56

## 2018-09-17 RX ADMIN — TAMSULOSIN HYDROCHLORIDE 0.4 MILLIGRAM(S): 0.4 CAPSULE ORAL at 22:40

## 2018-09-17 RX ADMIN — SODIUM CHLORIDE 1000 MILLILITER(S): 9 INJECTION INTRAMUSCULAR; INTRAVENOUS; SUBCUTANEOUS at 08:46

## 2018-09-17 RX ADMIN — Medication 500000 UNIT(S): at 06:03

## 2018-09-17 RX ADMIN — Medication 5 MILLIGRAM(S): at 06:03

## 2018-09-17 RX ADMIN — Medication 500 MILLIGRAM(S): at 17:25

## 2018-09-17 RX ADMIN — Medication 1: at 16:48

## 2018-09-17 RX ADMIN — Medication 1 TABLET(S): at 11:56

## 2018-09-17 RX ADMIN — MYCOPHENOLATE MOFETIL 500 MILLIGRAM(S): 250 CAPSULE ORAL at 06:03

## 2018-09-17 RX ADMIN — SODIUM CHLORIDE 1000 MILLILITER(S): 9 INJECTION INTRAMUSCULAR; INTRAVENOUS; SUBCUTANEOUS at 17:25

## 2018-09-17 RX ADMIN — Medication 60 MILLIGRAM(S): at 23:40

## 2018-09-17 RX ADMIN — TACROLIMUS 4 MILLIGRAM(S): 5 CAPSULE ORAL at 17:45

## 2018-09-17 RX ADMIN — SODIUM POLYSTYRENE SULFONATE 30 GRAM(S): 4.1 POWDER, FOR SUSPENSION ORAL at 16:48

## 2018-09-17 RX ADMIN — ALBUTEROL 2.5 MILLIGRAM(S): 90 AEROSOL, METERED ORAL at 22:30

## 2018-09-17 RX ADMIN — Medication 500000 UNIT(S): at 17:45

## 2018-09-17 RX ADMIN — MYCOPHENOLATE MOFETIL 500 MILLIGRAM(S): 250 CAPSULE ORAL at 17:45

## 2018-09-17 RX ADMIN — Medication 50 MILLILITER(S): at 17:25

## 2018-09-17 RX ADMIN — Medication 100 MILLIGRAM(S): at 11:56

## 2018-09-17 RX ADMIN — Medication 50 MILLILITER(S): at 23:40

## 2018-09-17 RX ADMIN — FAMOTIDINE 20 MILLIGRAM(S): 10 INJECTION INTRAVENOUS at 11:56

## 2018-09-17 RX ADMIN — Medication 80 MILLIGRAM(S): at 08:46

## 2018-09-17 RX ADMIN — Medication 500000 UNIT(S): at 11:55

## 2018-09-17 RX ADMIN — INSULIN HUMAN 10 UNIT(S): 100 INJECTION, SOLUTION SUBCUTANEOUS at 23:41

## 2018-09-17 RX ADMIN — INSULIN HUMAN 10 UNIT(S): 100 INJECTION, SOLUTION SUBCUTANEOUS at 17:24

## 2018-09-17 RX ADMIN — Medication 60 MILLIGRAM(S): at 22:31

## 2018-09-17 NOTE — PROGRESS NOTE ADULT - SUBJECTIVE AND OBJECTIVE BOX
Interventional Radiology Brief- Operative Note    Procedure: Nephrostomy tube check, antegrade nephrostogram    Operators: Maria Del Rosario    Anesthesia (type): None    Contrast: 25cc Omni    EBL: None    Findings/Follow up Plan of Care: nephrostomy tube is in adequate position. Contrast extends down ureter into bladder. Distal ureter is narrowed but not obstructed. Urinary stent is coiled within the bladder. Findings discussed with Ilir Thomas NP, following procedure.    Specimens Removed: None    Implants: None    Complications: None    Condition/Disposition: Stable / room    Please call Interventional Radiology x [phone] with any questions, concerns, or issues.

## 2018-09-17 NOTE — PROGRESS NOTE ADULT - PROBLEM SELECTOR PLAN 2
Cont tacro for level 8-10, pred 5 and MMF 500mgs BID.  Check CMV pcr - pending. Valcyte on hold due to leukopenia.

## 2018-09-17 NOTE — DISCHARGE NOTE ADULT - PLAN OF CARE
To be free from infection and obstruction Keep nephrostomy site clean and dry.  Empty the bag and document the output. Keep away from people who have cough, cold, and symptom of flu.  Only take medications that are on your discharge list  If you missed your medications call the transplant office, do not double up medication because you missed a dose.  If you have any question regarding your medication please call transplant office. HgA1C this admission.  Make sure you get your HgA1c checked every three months.  If you take oral diabetes medications, check your blood glucose two times a day.  If you take insulin, check your blood glucose before meals and at bedtime.  It's important not to skip any meals.  Keep a log of your blood glucose results and always take it with you to your doctor appointments.  Keep a list of your current medications including injectables and over the counter medications and bring this medication list with you to all your doctor appointments.  If you have not seen your ophthalmologist this year call for appointment.  Check your feet daily for redness, sores, or openings. Do not self treat. If no improvement in two days call your primary care physician for an appointment.  Low blood sugar (hypoglycemia) is a blood sugar below 70mg/dl. Check your blood sugar if you feel signs/symptoms of hypoglycemia. If your blood sugar is below 70 take 15 grams of carbohydrates (ex 4 oz of apple juice, 3-4 glucose tablets, or 4-6 oz of regular soda) wait 15 minutes and repeat blood sugar to make sure it comes up above 70.  If your blood sugar is above 70 and you are due for a meal, have a meal.  If you are not due for a meal have a snack.  This snack helps keeps your blood sugar at a safe range. Keep nephrostomy site clean and dry.

## 2018-09-17 NOTE — DISCHARGE NOTE ADULT - MEDICATION SUMMARY - MEDICATIONS TO TAKE
I will START or STAY ON the medications listed below when I get home from the hospital:    predniSONE 5 mg oral tablet  -- 1 tab(s) by mouth once a day  -- Indication: For Immunosuppression    acetaminophen 325 mg oral tablet  -- 2 tab(s) by mouth every 6 hours, As needed, Mild Pain (1 - 3)  -- Indication: For pain    tamsulosin 0.4 mg oral capsule  -- 1 cap(s) by mouth once a day (at bedtime)  -- Indication: For bph    gabapentin 300 mg oral capsule  -- 1 cap(s) by mouth once a day  -- Indication: For nauropathy    SITagliptin 25 mg oral tablet  -- 1 tab(s) by mouth once a day  -- Indication: For DM (diabetes mellitus)    HumaLOG KwikPen (Concentrated) 200 units/mL subcutaneous solution  -- unit(s) subcutaneous 4 times a day  -200=2units SQ  -250=4units SQ  251-300=6units sq  301-350=8units sq  >351=10 units and call MD  -- Indication: For DM (diabetes mellitus)    nystatin 100,000 units/mL oral suspension  -- 5 milliliter(s) by mouth 4 times a day  -- Indication: For ppx    allopurinol 100 mg oral tablet  -- 1 tab(s) by mouth once a day  -- Indication: For gout    valGANciclovir 450 mg oral tablet  -- 1 tab(s) by mouth   -- Indication: For ppx    Epclusa 400 mg-100 mg oral tablet  -- 1 tab(s) by mouth once a day (at bedtime)  -- Indication: For Hep C    famotidine 20 mg oral tablet  -- 1 tab(s) by mouth once a day  -- Indication: For gi    mycophenolate mofetil 250 mg oral capsule  -- 1000 milligram(s) by mouth 2 times a day  -- Indication: For Immunosuppression    tacrolimus 0.5 mg oral capsule  -- 7 cap(s) by mouth 2 times a day  -- Indication: For Immunosuppression    atovaquone 750 mg/5 mL oral suspension  -- 10 milliliter(s) by mouth once a day  -- Indication: For ppx I will START or STAY ON the medications listed below when I get home from the hospital:    predniSONE 5 mg oral tablet  -- 1 tab(s) by mouth once a day  -- Indication: For Immunosuppression    acetaminophen 325 mg oral tablet  -- 2 tab(s) by mouth every 6 hours, As needed, Mild Pain (1 - 3)  -- Indication: For pain    tamsulosin 0.4 mg oral capsule  -- 1 cap(s) by mouth once a day (at bedtime)  -- Indication: For bph    gabapentin 300 mg oral capsule  -- 1 cap(s) by mouth once a day  -- Indication: For nauropathy    SITagliptin 25 mg oral tablet  -- 1 tab(s) by mouth once a day  -- Indication: For DM (diabetes mellitus)    nystatin 100,000 units/mL oral suspension  -- 5 milliliter(s) by mouth 4 times a day  -- Indication: For ppx    allopurinol 100 mg oral tablet  -- 1 tab(s) by mouth once a day  -- Indication: For gout    valGANciclovir 450 mg oral tablet  -- 1 tab(s) by mouth   -- Indication: For ppx    Epclusa 400 mg-100 mg oral tablet  -- 1 tab(s) by mouth once a day (at bedtime)  -- Indication: For Hep C    famotidine 20 mg oral tablet  -- 1 tab(s) by mouth once a day  -- Indication: For gi    mycophenolate mofetil 250 mg oral capsule  -- 1000 milligram(s) by mouth 2 times a day  -- Indication: For Immunosuppression    tacrolimus 0.5 mg oral capsule  -- 7 cap(s) by mouth 2 times a day  -- Indication: For Immunosuppression    atovaquone 750 mg/5 mL oral suspension  -- 10 milliliter(s) by mouth once a day  -- Indication: For ppx

## 2018-09-17 NOTE — DISCHARGE NOTE ADULT - ADDITIONAL INSTRUCTIONS
FOLLOW-UP:  1. Please call to make a follow-up appointment with Dr. Yao next week  2. Please follow up with your primary care physician in one week regarding your hospitalization.

## 2018-09-17 NOTE — DISCHARGE NOTE ADULT - CARE PLAN
Principal Discharge DX:	Hydronephrosis  Goal:	To be free from infection and obstruction  Assessment and plan of treatment:	Keep nephrostomy site clean and dry.  Empty the bag and document the output.  Secondary Diagnosis:	Transplant recipient  Assessment and plan of treatment:	Keep away from people who have cough, cold, and symptom of flu.  Only take medications that are on your discharge list  If you missed your medications call the transplant office, do not double up medication because you missed a dose.  If you have any question regarding your medication please call transplant office.  Secondary Diagnosis:	DM (diabetes mellitus)  Assessment and plan of treatment:	HgA1C this admission.  Make sure you get your HgA1c checked every three months.  If you take oral diabetes medications, check your blood glucose two times a day.  If you take insulin, check your blood glucose before meals and at bedtime.  It's important not to skip any meals.  Keep a log of your blood glucose results and always take it with you to your doctor appointments.  Keep a list of your current medications including injectables and over the counter medications and bring this medication list with you to all your doctor appointments.  If you have not seen your ophthalmologist this year call for appointment.  Check your feet daily for redness, sores, or openings. Do not self treat. If no improvement in two days call your primary care physician for an appointment.  Low blood sugar (hypoglycemia) is a blood sugar below 70mg/dl. Check your blood sugar if you feel signs/symptoms of hypoglycemia. If your blood sugar is below 70 take 15 grams of carbohydrates (ex 4 oz of apple juice, 3-4 glucose tablets, or 4-6 oz of regular soda) wait 15 minutes and repeat blood sugar to make sure it comes up above 70.  If your blood sugar is above 70 and you are due for a meal, have a meal.  If you are not due for a meal have a snack.  This snack helps keeps your blood sugar at a safe range. Principal Discharge DX:	Hydronephrosis  Goal:	To be free from infection and obstruction  Assessment and plan of treatment:	Keep nephrostomy site clean and dry.  Secondary Diagnosis:	Transplant recipient  Assessment and plan of treatment:	Keep away from people who have cough, cold, and symptom of flu.  Only take medications that are on your discharge list  If you missed your medications call the transplant office, do not double up medication because you missed a dose.  If you have any question regarding your medication please call transplant office.  Secondary Diagnosis:	DM (diabetes mellitus)  Assessment and plan of treatment:	HgA1C this admission.  Make sure you get your HgA1c checked every three months.  If you take oral diabetes medications, check your blood glucose two times a day.  If you take insulin, check your blood glucose before meals and at bedtime.  It's important not to skip any meals.  Keep a log of your blood glucose results and always take it with you to your doctor appointments.  Keep a list of your current medications including injectables and over the counter medications and bring this medication list with you to all your doctor appointments.  If you have not seen your ophthalmologist this year call for appointment.  Check your feet daily for redness, sores, or openings. Do not self treat. If no improvement in two days call your primary care physician for an appointment.  Low blood sugar (hypoglycemia) is a blood sugar below 70mg/dl. Check your blood sugar if you feel signs/symptoms of hypoglycemia. If your blood sugar is below 70 take 15 grams of carbohydrates (ex 4 oz of apple juice, 3-4 glucose tablets, or 4-6 oz of regular soda) wait 15 minutes and repeat blood sugar to make sure it comes up above 70.  If your blood sugar is above 70 and you are due for a meal, have a meal.  If you are not due for a meal have a snack.  This snack helps keeps your blood sugar at a safe range.

## 2018-09-17 NOTE — DISCHARGE NOTE ADULT - HOSPITAL COURSE
D/C summary:  Pt evaluated by the disciplinary team including nephrologist, pharmacist, nutrition, social work, NP, and surgeon daily where plan of care reviewed and discussed.  Now pt doing well clear for discharge home and follow up at transplant clinic in 2 days of discharge.      Kidney replaced by transplant    No heavy lifting anything more than 10-15lbs or straining. Otherwise, you may return to your usual level of physical activity. If you are taking narcotic pain medication (such as Percocet), do NOT drive a car, operate machinery or make important decisions.  Call trnansplant clinic If you developed any of the following, fever, pain, redness, swelling at incision site, cough, nausea, vomiting, painfull urination, difficulty urination, or not making any urine.  NOTIFY YOUR SURGEON IF: You have any bleeding that does not stop, any pus draining from your wound, any fever (over 100.4 F) or chills, persistent nausea/vomiting with inability to tolerate food or liquids, persistent diarrhea, or if your pain is not controlled on your discharge pain medications. 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 Pt evaluated by the disciplinary team including nephrologist, pharmacist, nutrition, social work, NP, and surgeon daily where plan of care reviewed and discussed.  Now pt doing well clear for discharge home and follow up at transplant clinic next week.

## 2018-09-17 NOTE — PROGRESS NOTE ADULT - PROBLEM SELECTOR PLAN 2
Strict I/Os  Daily BMP. Monitor K+  Continue Allopurinol for uric acid crystals, Flomax, Pepcid  Continue Epclusa for HCV treatment.    Continue Nystatin, Bactrim, Tacrolimus, Cellcept 500 BID and Prednisone  Valcyte on hold due to leukopenia.  Will follow  Daily Tacrolimus levels. Hyperkalemia 5.4 today give Lasix 80mg IV X1 and NS 500ml bolus x1, repeat BMP at 2 pm  Strict I/Os  Daily BMP. Monitor K+  Continue Allopurinol for uric acid crystals, Flomax, Pepcid  Continue Epclusa for HCV treatment.    Continue Nystatin, Bactrim, Tacrolimus, Cellcept 500 BID and Prednisone  Valcyte on hold due to leukopenia.  Will follow  Daily Tacrolimus levels.

## 2018-09-17 NOTE — DISCHARGE NOTE ADULT - CARE PROVIDER_API CALL
Maxwell Yao), Surgery  [address] [place] [address]  Phone: [phone]  Fax: [phone]    Arian Bhakta), Nephrology  [address] [place] [address]  Phone: [phone]  Fax: [phone]

## 2018-09-17 NOTE — DISCHARGE NOTE ADULT - CARE PROVIDERS DIRECT ADDRESSES
[email or web address]

## 2018-09-17 NOTE — PROGRESS NOTE ADULT - SUBJECTIVE AND OBJECTIVE BOX
[facility] DIVISION OF KIDNEY DISEASES AND HYPERTENSION -- FOLLOW UP NOTE  --------------------------------------------------------------------------------  Chief Complaint:  DDRT 7/17/18  CHELA on CKD.     24 hour events/subjective:  Pt examined at bed side, not in acute distress. Nephrostomy tube draining, no overnight events.       PAST HISTORY  --------------------------------------------------------------------------------  No significant changes to PMH, PSH, FHx, SHx, unless otherwise noted    ALLERGIES & MEDICATIONS  --------------------------------------------------------------------------------  Allergies    No Known Allergies    Intolerances      Standing Inpatient Medications  allopurinol 100 milliGRAM(s) Oral daily  ciprofloxacin     Tablet 500 milliGRAM(s) Oral every 12 hours  dextrose 5%. 1000 milliLiter(s) IV Continuous <Continuous>  dextrose 50% Injectable 12.5 Gram(s) IV Push once  dextrose 50% Injectable 25 Gram(s) IV Push once  dextrose 50% Injectable 25 Gram(s) IV Push once  Epclusa (Sofosbuvir 400 and Velpatasvir) 1 Tablet(s) 1 Tablet(s) Oral daily  famotidine    Tablet 20 milliGRAM(s) Oral daily  gabapentin 300 milliGRAM(s) Oral daily  influenza   Vaccine 0.5 milliLiter(s) IntraMuscular once  insulin lispro (HumaLOG) corrective regimen sliding scale   SubCutaneous three times a day before meals  insulin lispro (HumaLOG) corrective regimen sliding scale   SubCutaneous at bedtime  mycophenolate mofetil 500 milliGRAM(s) Oral two times a day  nystatin    Suspension 792845 Unit(s) Oral four times a day  predniSONE   Tablet 5 milliGRAM(s) Oral daily  sitaGLIPtin 25 milliGRAM(s) Oral daily  tacrolimus 4 milliGRAM(s) Oral two times a day  tamsulosin 0.4 milliGRAM(s) Oral at bedtime  trimethoprim   80 mG/sulfamethoxazole 400 mG 1 Tablet(s) Oral daily    PRN Inpatient Medications  dextrose 40% Gel 15 Gram(s) Oral once PRN  glucagon  Injectable 1 milliGRAM(s) IntraMuscular once PRN      REVIEW OF SYSTEMS  --------------------------------------------------------------------------------  Gen: No fatigue, fevers/chills, weakness  Skin: No rashes  Head/Eyes/Ears/Mouth: No headache;No sore throat  Respiratory: No dyspnea, cough,   CV: No chest pain, PND, orthopnea  GI: No abdominal pain, diarrhea, constipation, nausea, vomiting  Transplant: No pain  : No increased frequency, dysuria, hematuria, nocturia  MSK: No joint pain/swelling; no back pain; no edema  Neuro: No dizziness/lightheadedness, weakness, seizures, numbness, tingling  Psych: No significant nervousness, anxiety, stress, depression    VITALS/PHYSICAL EXAM  --------------------------------------------------------------------------------  T(C): 36.8 (09-17-18 @ 06:13), Max: 36.9 (09-16-18 @ 21:38)  HR: 83 (09-17-18 @ 06:13) (83 - 93)  BP: 153/79 (09-17-18 @ 06:13) (109/67 - 162/89)  RR: 18 (09-17-18 @ 06:13) (18 - 20)  SpO2: 100% (09-17-18 @ 06:13) (94% - 100%)  Wt(kg): --        09-16-18 @ 07:01  -  09-17-18 @ 07:00  --------------------------------------------------------  IN: 1400 mL / OUT: 1575 mL / NET: -175 mL    09-17-18 @ 07:01  -  09-17-18 @ 08:58  --------------------------------------------------------  IN: 0 mL / OUT: 125 mL / NET: -125 mL      Physical Exam:  	Gen: NAD, well-appearing  	HEENT: PERRL, supple neck, clear oropharynx  	Pulm: CTA B/L  	CV: RRR, S1S2; no rub  	Back: No spinal or CVA tenderness; no sacral edema  	Abd: +BS, soft, nontender/nondistended              Transplant: No tenderness, swelling, nephrostomy in place  	: No suprapubic tenderness  	UE: Warm, FROM, intact strength; no edema; no asterixis  	LE: Warm, FROM, intact strength; no edema  	Skin: Warm, without rashes      LABS/STUDIES  --------------------------------------------------------------------------------              8.7    2.9   >-----------<  133      [09-17-18 @ 06:28]              25.9     138  |  109  |  34  ----------------------------<  111      [09-17-18 @ 06:28]  5.4   |  19  |  3.39        Ca     9.6     [09-17-18 @ 06:28]      Mg     1.9     [09-17-18 @ 06:28]      Phos  3.0     [09-17-18 @ 06:28]      Creatinine Trend:  SCr 3.39 [09-17 @ 06:28]  SCr 4.13 [09-16 @ 07:03]  SCr 3.84 [09-15 @ 17:41]  SCr 4.62 [09-15 @ 06:57]  SCr 4.55 [09-14 @ 18:35]

## 2018-09-17 NOTE — PROGRESS NOTE ADULT - SUBJECTIVE AND OBJECTIVE BOX
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   His post-operative course was complicated by delayed graft function requiring TIW HD and PO Lasix 80mg BID.   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  Patient's creatinine had been improving as an outpatient but recently found to have a creatine of 5.  Patient s/p  placement of transplanted kidney nephrostomy tube on 9/14/18. Pt presents to IR for nephrostogram today. Pt a&o x 3. Informed consent obtained from pt after risks, benefits, alternatives discussion with his comprehension confirmed.       PAST MEDICAL & SURGICAL HISTORY:  ESRD (end stage renal disease) on dialysis  Kidney cancer, primary, with metastasis from kidney to other site: with no mets  HTN (hypertension)  DM (diabetes mellitus)  S/p nephrectomy: right 12/10/2015  S/p nephrectomy: left 9/15/2015  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      FAMILY HISTORY:  No pertinent family history in first degree relatives      Allergies: No Known Allergies      Current Medications: allopurinol 100 milliGRAM(s) Oral daily  ciprofloxacin     Tablet 500 milliGRAM(s) Oral every 12 hours  dextrose 40% Gel 15 Gram(s) Oral once PRN  dextrose 5%. 1000 milliLiter(s) IV Continuous <Continuous>  dextrose 50% Injectable 12.5 Gram(s) IV Push once  dextrose 50% Injectable 25 Gram(s) IV Push once  dextrose 50% Injectable 25 Gram(s) IV Push once  Epclusa (Sofosbuvir 400 and Velpatasvir) 1 Tablet(s) 1 Tablet(s) Oral daily  famotidine    Tablet 20 milliGRAM(s) Oral daily  gabapentin 300 milliGRAM(s) Oral daily  glucagon  Injectable 1 milliGRAM(s) IntraMuscular once PRN  influenza   Vaccine 0.5 milliLiter(s) IntraMuscular once  insulin lispro (HumaLOG) corrective regimen sliding scale   SubCutaneous three times a day before meals  insulin lispro (HumaLOG) corrective regimen sliding scale   SubCutaneous at bedtime  mycophenolate mofetil 500 milliGRAM(s) Oral two times a day  nystatin    Suspension 688847 Unit(s) Oral four times a day  predniSONE   Tablet 5 milliGRAM(s) Oral daily  sitaGLIPtin 25 milliGRAM(s) Oral daily  tacrolimus 4 milliGRAM(s) Oral two times a day  tamsulosin 0.4 milliGRAM(s) Oral at bedtime  trimethoprim   80 mG/sulfamethoxazole 400 mG 1 Tablet(s) Oral daily      Labs:                         8.7    2.9   )-----------( 133      ( 17 Sep 2018 06:28 )             25.9       09-17    138  |  109<H>  |  34<H>  ----------------------------<  111<H>  5.4<H>   |  19<L>  |  3.39<H>    Ca    9.6      17 Sep 2018 06:28  Phos  3.0     09-17  Mg     1.9     09-17          Assessment/Plan:   This is a 67 year old male with hx of ESRD on HD, s/p DDRT 7/2018 admitted with elevated creatinine & hyperkalemia, hydronephrosis, s/p xplant kidney nephrostomy tube placement 9/14/18. Creatinine level trending down s/p neph tube placement.  Patient presents to IR for nephrostogram.    Lin TOUSSAINT BC  ext [phone]  # [phone]

## 2018-09-17 NOTE — DISCHARGE NOTE ADULT - MEDICATION SUMMARY - MEDICATIONS TO CHANGE
I will SWITCH the dose or number of times a day I take the medications listed below when I get home from the hospital:    predniSONE 5 mg oral tablet  -- 4 tab(s) by mouth two times a day x 1 day on 8/16/18 then  4 tabs daily    tacrolimus 1 mg oral capsule  -- 5 cap(s) by mouth 2 times a day

## 2018-09-17 NOTE — PROGRESS NOTE ADULT - SUBJECTIVE AND OBJECTIVE BOX
Transplant Surgery - Multidisciplinary Rounds  --------------------------------------------------------------  DDRT 7/17/18    Present:  Patient seen with multidisciplinary team icluding ( Transplant Surgeon: Dr. Russo,  Dr. Castaneda, Dr. Nicole, Transplant Nephrologist: Dr. Bhakta and nephrology fellow, Pharmacist: Abhishek Harrell, KELBY Thomas, RN, and RN manager Ling).  in am rounds and examined with Dr. Nicole.   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 CIT 22 hours.  Simulect induction. His post-operative course was complicated by delayed graft function requiring TIW HD and PO Lasix 80mg BID.   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  Patient's creatinine had been improving as an outpatient but recently found to have a creatine of 5. US revealed some concern for TRAS, perinephric collection and hydronephrosis. Patient was sent to IR for retrograde nephrostogram and placement of nephrostomy tube with drainage of perinephric collection.  On arrival to IR patient was found to be hyperkalemic to 6.7.  Patient was admitted for medical management of hyperkalemia with procedure delayed. Denies any symptoms, no N/V/D, no CP, no SOB. 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     24 hours events:   No acute events overnight. Nephrostomy tube draining clear pink-tinged urine. Denies complaints of pain. Received 1 dose Procrit for anemia       Potential Discharge date: 9/18/18    Education: Medications    Plan of care: see below    MEDICATIONS  (STANDING):  allopurinol 100 milliGRAM(s) Oral daily  ciprofloxacin     Tablet 500 milliGRAM(s) Oral every 12 hours  dextrose 5%. 1000 milliLiter(s) (50 mL/Hr) IV Continuous <Continuous>  dextrose 50% Injectable 12.5 Gram(s) IV Push once  dextrose 50% Injectable 25 Gram(s) IV Push once  dextrose 50% Injectable 25 Gram(s) IV Push once  Epclusa (Sofosbuvir 400 and Velpatasvir) 1 Tablet(s) 1 Tablet(s) Oral daily  famotidine    Tablet 20 milliGRAM(s) Oral daily  gabapentin 300 milliGRAM(s) Oral daily  influenza   Vaccine 0.5 milliLiter(s) IntraMuscular once  insulin lispro (HumaLOG) corrective regimen sliding scale   SubCutaneous three times a day before meals  insulin lispro (HumaLOG) corrective regimen sliding scale   SubCutaneous at bedtime  mycophenolate mofetil 500 milliGRAM(s) Oral two times a day  nystatin    Suspension 866040 Unit(s) Oral four times a day  predniSONE   Tablet 5 milliGRAM(s) Oral daily  sitaGLIPtin 25 milliGRAM(s) Oral daily  tacrolimus 4 milliGRAM(s) Oral two times a day  tamsulosin 0.4 milliGRAM(s) Oral at bedtime  trimethoprim   80 mG/sulfamethoxazole 400 mG 1 Tablet(s) Oral daily    MEDICATIONS  (PRN):  dextrose 40% Gel 15 Gram(s) Oral once PRN Blood Glucose LESS THAN 70 milliGRAM(s)/deciliter  glucagon  Injectable 1 milliGRAM(s) IntraMuscular once PRN Glucose LESS THAN 70 milligrams/deciliter      PAST MEDICAL & SURGICAL HISTORY:  ESRD (end stage renal disease) on dialysis  Kidney cancer, primary, with metastasis from kidney to other site: with no mets  HTN (hypertension)  DM (diabetes mellitus)  S/p nephrectomy: right 12/10/2015  S/p nephrectomy: left 9/15/2015  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      Vital Signs Last 24 Hrs  T(C): 36.8 (17 Sep 2018 06:13), Max: 36.9 (16 Sep 2018 21:38)  T(F): 98.2 (17 Sep 2018 06:13), Max: 98.5 (17 Sep 2018 02:35)  HR: 83 (17 Sep 2018 06:13) (83 - 93)  BP: 153/79 (17 Sep 2018 06:13) (109/67 - 162/89)  BP(mean): --  RR: 18 (17 Sep 2018 06:13) (18 - 20)  SpO2: 100% (17 Sep 2018 06:13) (94% - 100%)    I&O's Summary    16 Sep 2018 07:01  -  17 Sep 2018 07:00  --------------------------------------------------------  IN: 1400 mL / OUT: 1575 mL / NET: -175 mL    17 Sep 2018 07:01  -  17 Sep 2018 09:27  --------------------------------------------------------  IN: 0 mL / OUT: 125 mL / NET: -125 mL                              8.7    2.9   )-----------( 133      ( 17 Sep 2018 06:28 )             25.9     09-17    138  |  109<H>  |  34<H>  ----------------------------<  111<H>  5.4<H>   |  19<L>  |  3.39<H>    Ca    9.6      17 Sep 2018 06:28  Phos  3.0     09-17  Mg     1.9     09-17      Tacrolimus (), Serum: 11.2 ng/mL (09-16 @ 09:28)    Review of systems  Gen: No weight changes, fatigue, fevers/chills, weakness  Skin: No rashes  Head/Eyes/Ears/Mouth: No headache; Normal hearing; Normal vision w/o blurriness; No sinus pain/discomfort, sore throat  Respiratory: No dyspnea, cough, wheezing, hemoptysis  CV: No chest pain, PND, orthopnea  GI: No abdominal pain, diarrhea, constipation, nausea, vomiting, melena, hematochezia  : No increased frequency, dysuria, hematuria, nocturia  MSK: No joint pain/swelling; no back pain; no edema  Neuro: No dizziness/lightheadedness, weakness, seizures, numbness, tingling  Heme: No easy bruising or bleeding  Endo: No heat/cold intolerance  Psych: No significant nervousness, anxiety, stress, depression  All other systems were reviewed and are negative, except as noted.    PHYSICAL EXAM:  Constitutional: Well developed / well nourished  Eyes: Anicteric, PERRLA  ENMT: nc/at  Neck: supple  Respiratory: CTA B/L  Cardiovascular: RRR  Gastrointestinal: Soft abdomen, NT, ND  Genitourinary: Voiding spontaneously. Nephrostomy drain in situ with pink colored urine  Extremities: SCD's in place and working bilaterally  Vascular: Palpable dp pulses bilaterally  Neurological: A&O x3  Skin: Wound well healed   Musculoskeletal: Moving all extremities  Psychiatric: Responsive

## 2018-09-17 NOTE — DISCHARGE NOTE ADULT - MEDICATION SUMMARY - MEDICATIONS TO STOP TAKING
I will STOP taking the medications listed below when I get home from the hospital:    sulfamethoxazole-trimethoprim 400 mg-80 mg oral tablet  -- 1 tab(s) by mouth once a day I will STOP taking the medications listed below when I get home from the hospital:    sulfamethoxazole-trimethoprim 400 mg-80 mg oral tablet  -- 1 tab(s) by mouth once a day    HumaLOG KwikPen (Concentrated) 200 units/mL subcutaneous solution  -- unit(s) subcutaneous 4 times a day  -200=2units SQ  -250=4units SQ  251-300=6units sq  301-350=8units sq  >351=10 units and call MD

## 2018-09-18 ENCOUNTER — TRANSCRIPTION ENCOUNTER (OUTPATIENT)
Age: 68
End: 2018-09-18

## 2018-09-18 LAB
ANION GAP SERPL CALC-SCNC: 12 MMOL/L — SIGNIFICANT CHANGE UP (ref 5–17)
ANION GAP SERPL CALC-SCNC: 13 MMOL/L — SIGNIFICANT CHANGE UP (ref 5–17)
BUN SERPL-MCNC: 35 MG/DL — HIGH (ref 7–23)
BUN SERPL-MCNC: 36 MG/DL — HIGH (ref 7–23)
CALCIUM SERPL-MCNC: 9.5 MG/DL — SIGNIFICANT CHANGE UP (ref 8.4–10.5)
CALCIUM SERPL-MCNC: 9.9 MG/DL — SIGNIFICANT CHANGE UP (ref 8.4–10.5)
CHLORIDE SERPL-SCNC: 108 MMOL/L — SIGNIFICANT CHANGE UP (ref 96–108)
CHLORIDE SERPL-SCNC: 110 MMOL/L — HIGH (ref 96–108)
CO2 SERPL-SCNC: 18 MMOL/L — LOW (ref 22–31)
CO2 SERPL-SCNC: 18 MMOL/L — LOW (ref 22–31)
CREAT SERPL-MCNC: 3.67 MG/DL — HIGH (ref 0.5–1.3)
CREAT SERPL-MCNC: 4.21 MG/DL — HIGH (ref 0.5–1.3)
GLUCOSE BLDC GLUCOMTR-MCNC: 143 MG/DL — HIGH (ref 70–99)
GLUCOSE BLDC GLUCOMTR-MCNC: 183 MG/DL — HIGH (ref 70–99)
GLUCOSE BLDC GLUCOMTR-MCNC: 196 MG/DL — HIGH (ref 70–99)
GLUCOSE BLDC GLUCOMTR-MCNC: 217 MG/DL — HIGH (ref 70–99)
GLUCOSE SERPL-MCNC: 100 MG/DL — HIGH (ref 70–99)
GLUCOSE SERPL-MCNC: 116 MG/DL — HIGH (ref 70–99)
HCT VFR BLD CALC: 27.9 % — LOW (ref 39–50)
HGB BLD-MCNC: 9.4 G/DL — LOW (ref 13–17)
INR BLD: 1 RATIO — SIGNIFICANT CHANGE UP (ref 0.88–1.16)
MAGNESIUM SERPL-MCNC: 1.7 MG/DL — SIGNIFICANT CHANGE UP (ref 1.6–2.6)
MAGNESIUM SERPL-MCNC: 1.8 MG/DL — SIGNIFICANT CHANGE UP (ref 1.6–2.6)
MCHC RBC-ENTMCNC: 33.8 GM/DL — SIGNIFICANT CHANGE UP (ref 32–36)
MCHC RBC-ENTMCNC: 33.9 PG — SIGNIFICANT CHANGE UP (ref 27–34)
MCV RBC AUTO: 100 FL — SIGNIFICANT CHANGE UP (ref 80–100)
PHOSPHATE SERPL-MCNC: 3.2 MG/DL — SIGNIFICANT CHANGE UP (ref 2.5–4.5)
PHOSPHATE SERPL-MCNC: 3.6 MG/DL — SIGNIFICANT CHANGE UP (ref 2.5–4.5)
PLATELET # BLD AUTO: 137 K/UL — LOW (ref 150–400)
POTASSIUM SERPL-MCNC: 4.9 MMOL/L — SIGNIFICANT CHANGE UP (ref 3.5–5.3)
POTASSIUM SERPL-MCNC: 4.9 MMOL/L — SIGNIFICANT CHANGE UP (ref 3.5–5.3)
POTASSIUM SERPL-SCNC: 4.9 MMOL/L — SIGNIFICANT CHANGE UP (ref 3.5–5.3)
POTASSIUM SERPL-SCNC: 4.9 MMOL/L — SIGNIFICANT CHANGE UP (ref 3.5–5.3)
PROTHROM AB SERPL-ACNC: 10.9 SEC — SIGNIFICANT CHANGE UP (ref 9.8–12.7)
RBC # BLD: 2.78 M/UL — LOW (ref 4.2–5.8)
RBC # FLD: 17.7 % — HIGH (ref 10.3–14.5)
SODIUM SERPL-SCNC: 138 MMOL/L — SIGNIFICANT CHANGE UP (ref 135–145)
SODIUM SERPL-SCNC: 141 MMOL/L — SIGNIFICANT CHANGE UP (ref 135–145)
TACROLIMUS SERPL-MCNC: 10.9 NG/ML — SIGNIFICANT CHANGE UP
WBC # BLD: 3 K/UL — LOW (ref 3.8–10.5)
WBC # FLD AUTO: 3 K/UL — LOW (ref 3.8–10.5)

## 2018-09-18 PROCEDURE — 99232 SBSQ HOSP IP/OBS MODERATE 35: CPT | Mod: GC

## 2018-09-18 RX ORDER — MAGNESIUM SULFATE 500 MG/ML
2 VIAL (ML) INJECTION ONCE
Qty: 0 | Refills: 0 | Status: COMPLETED | OUTPATIENT
Start: 2018-09-18 | End: 2018-09-18

## 2018-09-18 RX ORDER — ATOVAQUONE 750 MG/5ML
1500 SUSPENSION ORAL DAILY
Qty: 0 | Refills: 0 | Status: DISCONTINUED | OUTPATIENT
Start: 2018-09-18 | End: 2018-09-21

## 2018-09-18 RX ADMIN — TACROLIMUS 4 MILLIGRAM(S): 5 CAPSULE ORAL at 18:03

## 2018-09-18 RX ADMIN — TACROLIMUS 4 MILLIGRAM(S): 5 CAPSULE ORAL at 06:41

## 2018-09-18 RX ADMIN — Medication 1: at 12:43

## 2018-09-18 RX ADMIN — Medication 50 GRAM(S): at 03:12

## 2018-09-18 RX ADMIN — Medication 500 MILLIGRAM(S): at 18:03

## 2018-09-18 RX ADMIN — GABAPENTIN 300 MILLIGRAM(S): 400 CAPSULE ORAL at 12:46

## 2018-09-18 RX ADMIN — Medication 500000 UNIT(S): at 12:44

## 2018-09-18 RX ADMIN — ATOVAQUONE 1500 MILLIGRAM(S): 750 SUSPENSION ORAL at 12:45

## 2018-09-18 RX ADMIN — FAMOTIDINE 20 MILLIGRAM(S): 10 INJECTION INTRAVENOUS at 12:45

## 2018-09-18 RX ADMIN — MYCOPHENOLATE MOFETIL 500 MILLIGRAM(S): 250 CAPSULE ORAL at 06:41

## 2018-09-18 RX ADMIN — Medication 500000 UNIT(S): at 06:41

## 2018-09-18 RX ADMIN — Medication 5 MILLIGRAM(S): at 06:41

## 2018-09-18 RX ADMIN — Medication 100 MILLIGRAM(S): at 12:46

## 2018-09-18 RX ADMIN — Medication 500 MILLIGRAM(S): at 06:41

## 2018-09-18 RX ADMIN — Medication 500000 UNIT(S): at 18:04

## 2018-09-18 RX ADMIN — Medication 50 GRAM(S): at 09:57

## 2018-09-18 RX ADMIN — TAMSULOSIN HYDROCHLORIDE 0.4 MILLIGRAM(S): 0.4 CAPSULE ORAL at 21:15

## 2018-09-18 RX ADMIN — MYCOPHENOLATE MOFETIL 500 MILLIGRAM(S): 250 CAPSULE ORAL at 18:04

## 2018-09-18 RX ADMIN — Medication 2: at 17:17

## 2018-09-18 NOTE — PROGRESS NOTE ADULT - PROBLEM SELECTOR PLAN 2
Cont tacro for level 8-10, pred 5 and MMF 500mgs BID.    CMV negative. 09/16/18  continue to hold Valcyte due to leukopenia.

## 2018-09-18 NOTE — PROGRESS NOTE ADULT - PROBLEM SELECTOR PLAN 2
Strict I/Os  Daily BMP. Monitor K+  Continue Allopurinol for uric acid crystals, Flomax, Pepcid  Continue Epclusa for HCV treatment.   Continue Nystatin, , Tacrolimus, Cellcept 500 BID and Prednisone  Valcyte on hold due to leukopenia.  Bactrim placed on hold start Mepron f/u G6PD  Will follow Daily Tacrolimus levels.  Send for G6PD, oxalate urine, f/u results

## 2018-09-18 NOTE — PROGRESS NOTE ADULT - SUBJECTIVE AND OBJECTIVE BOX
Transplant Surgery - Multidisciplinary Rounds  --------------------------------------------------------------  DDRT 7/17/18    Present:  Patient seen with multidisciplinary team icluding:   Transplant Surgeon:   Dr. Karan Heath    Transplant Nephrologist:  Dr. Nerissa Bhakta    Transplant Coordinators:  Reinier Cheek ([place]) Vicenta Chin (Living Donor Coordinator)    Social Work:  Christen Coon   Mary Terry (Living Donor SW/ELEN)    Pharmacist:  Abhishek Harrell    Nutrition:  Annette Carrasquillo  RN manager Ling    in am rounds and examined with Dr. Heath. 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 CIT 22 hours.  Simulect induction. His post-operative course was complicated by delayed graft function requiring TIW HD and PO Lasix 80mg BID.   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  Patient's creatinine had been improving as an outpatient but recently found to have a creatine of 5. US revealed some concern for TRAS, perinephric collection and hydronephrosis. Patient was sent to IR for retrograde nephrostogram and placement of nephrostomy tube with drainage of perinephric collection.  On arrival to IR patient was found to be hyperkalemic to 6.7.  Patient was admitted for medical management of hyperkalemia with procedure delayed. Denies any symptoms, no N/V/D, no CP, no SOB. 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  S/P Nephrostogram from yesterday revealed that distal ureter is narrowed but not obstructed, Urinary stent is coiled within the bladder.  No acute events overnight, hyperkalemia improved with treatment, K is 4.9 today, making urine via nephrostomy tube, Cr trending up.    Potential Discharge date:  9/19/18    Education: medication    Plan of care: see below    MEDICATIONS  (STANDING):  allopurinol 100 milliGRAM(s) Oral daily  atovaquone Suspension 1500 milliGRAM(s) Oral daily  ciprofloxacin     Tablet 500 milliGRAM(s) Oral every 12 hours  dextrose 5%. 1000 milliLiter(s) (50 mL/Hr) IV Continuous <Continuous>  dextrose 50% Injectable 12.5 Gram(s) IV Push once  dextrose 50% Injectable 25 Gram(s) IV Push once  dextrose 50% Injectable 25 Gram(s) IV Push once  Epclusa (Sofosbuvir 400 and Velpatasvir) 1 Tablet(s) 1 Tablet(s) Oral daily  famotidine    Tablet 20 milliGRAM(s) Oral daily  gabapentin 300 milliGRAM(s) Oral daily  influenza   Vaccine 0.5 milliLiter(s) IntraMuscular once  insulin lispro (HumaLOG) corrective regimen sliding scale   SubCutaneous three times a day before meals  insulin lispro (HumaLOG) corrective regimen sliding scale   SubCutaneous at bedtime  magnesium sulfate  IVPB 2 Gram(s) IV Intermittent once  mycophenolate mofetil 500 milliGRAM(s) Oral two times a day  nystatin    Suspension [id number] Unit(s) Oral four times a day  predniSONE   Tablet 5 milliGRAM(s) Oral daily  sitaGLIPtin 25 milliGRAM(s) Oral daily  tacrolimus 4 milliGRAM(s) Oral two times a day  tamsulosin 0.4 milliGRAM(s) Oral at bedtime    MEDICATIONS  (PRN):  dextrose 40% Gel 15 Gram(s) Oral once PRN Blood Glucose LESS THAN 70 milliGRAM(s)/deciliter  glucagon  Injectable 1 milliGRAM(s) IntraMuscular once PRN Glucose LESS THAN 70 milligrams/deciliter      PAST MEDICAL & SURGICAL HISTORY:  ESRD (end stage renal disease) on dialysis  Kidney cancer, primary, with metastasis from kidney to other site: with no mets  HTN (hypertension)  DM (diabetes mellitus)  S/p nephrectomy: right 12/10/2015  S/p nephrectomy: left 9/15/2015  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      Vital Signs Last 24 Hrs  T(C): 36.6 (18 Sep 2018 05:49), Max: 37.1 (17 Sep 2018 18:15)  T(F): 97.8 (18 Sep 2018 05:49), Max: 98.7 (17 Sep 2018 18:15)  HR: 89 (18 Sep 2018 05:49) (78 - 98)  BP: 115/72 (18 Sep 2018 05:49) (115/72 - 139/77)  BP(mean): --  RR: 18 (18 Sep 2018 05:49) (18 - 20)  SpO2: 98% (18 Sep 2018 05:49) (97% - 100%)    I&O's Summary    17 Sep 2018 07:01  -  18 Sep 2018 07:00  --------------------------------------------------------  IN: 2010 mL / OUT: 2370 mL / NET: -360 mL    18 Sep 2018 07:01  -  18 Sep 2018 11:16  --------------------------------------------------------  IN: 0 mL / OUT: 200 mL / NET: -200 mL                              9.4    3.0   )-----------( 137      ( 18 Sep 2018 05:54 )             27.9     09-18    141  |  110<H>  |  36<H>  ----------------------------<  116<H>  4.9   |  18<L>  |  4.21<H>    Ca    9.9      18 Sep 2018 05:54  Phos  3.6     09-18  Mg     1.8     09-18      Tacrolimus (), Serum: 10.9 ng/mL (09-18 @ 07:54)     Review of systems  Gen: No weight changes, fatigue, fevers/chills, weakness  Skin: No rashes  Head/Eyes/Ears/Mouth: No headache; Normal hearing; Normal vision w/o blurriness; No sinus pain/discomfort, sore throat  Respiratory: No dyspnea, cough, wheezing, hemoptysis  CV: No chest pain, PND, orthopnea  GI: No abdominal pain, diarrhea, constipation, nausea, vomiting, melena, hematochezia  : No increased frequency, dysuria, hematuria, nocturia  MSK: No joint pain/swelling; no back pain; no edema  Neuro: No dizziness/lightheadedness, weakness, seizures, numbness, tingling  Heme: No easy bruising or bleeding  Endo: No heat/cold intolerance  Psych: No significant nervousness, anxiety, stress, depression  All other systems were reviewed and are negative, except as noted.    PHYSICAL EXAM:  Constitutional: Well developed / well nourished  Eyes: Anicteric, PERRLA  ENMT: nc/at  Neck: supple  Respiratory: CTA B/L  Cardiovascular: RRR  Gastrointestinal: Soft abdomen, NT, ND  Genitourinary: Voiding spontaneously. Nephrostomy drain in situ with pink colored urine  Extremities: SCD's in place and working bilaterally  Vascular: Palpable dp pulses bilaterally  Neurological: A&O x3  Skin: Wound well healed   Musculoskeletal: Moving all extremities  Psychiatric: Responsive

## 2018-09-18 NOTE — PROGRESS NOTE ADULT - SUBJECTIVE AND OBJECTIVE BOX
[facility] DIVISION OF KIDNEY DISEASES AND HYPERTENSION -- FOLLOW UP NOTE  --------------------------------------------------------------------------------  Chief Complaint:  DDRT 7/17/18  CHELA on CKD.     24 hour events/subjective:  Pt examined at bed side, not in distress. No overnight events. Underwent nephrostogram yesterday, no complications.       PAST HISTORY  --------------------------------------------------------------------------------  No significant changes to PMH, PSH, FHx, SHx, unless otherwise noted    ALLERGIES & MEDICATIONS  --------------------------------------------------------------------------------  Allergies    No Known Allergies    Intolerances      Standing Inpatient Medications  allopurinol 100 milliGRAM(s) Oral daily  atovaquone Suspension 1500 milliGRAM(s) Oral daily  ciprofloxacin     Tablet 500 milliGRAM(s) Oral every 12 hours  dextrose 5%. 1000 milliLiter(s) IV Continuous <Continuous>  dextrose 50% Injectable 12.5 Gram(s) IV Push once  dextrose 50% Injectable 25 Gram(s) IV Push once  dextrose 50% Injectable 25 Gram(s) IV Push once  Epclusa (Sofosbuvir 400 and Velpatasvir) 1 Tablet(s) 1 Tablet(s) Oral daily  famotidine    Tablet 20 milliGRAM(s) Oral daily  gabapentin 300 milliGRAM(s) Oral daily  influenza   Vaccine 0.5 milliLiter(s) IntraMuscular once  insulin lispro (HumaLOG) corrective regimen sliding scale   SubCutaneous three times a day before meals  insulin lispro (HumaLOG) corrective regimen sliding scale   SubCutaneous at bedtime  magnesium sulfate  IVPB 2 Gram(s) IV Intermittent once  mycophenolate mofetil 500 milliGRAM(s) Oral two times a day  nystatin    Suspension 757020 Unit(s) Oral four times a day  predniSONE   Tablet 5 milliGRAM(s) Oral daily  sitaGLIPtin 25 milliGRAM(s) Oral daily  tacrolimus 4 milliGRAM(s) Oral two times a day  tamsulosin 0.4 milliGRAM(s) Oral at bedtime    PRN Inpatient Medications  dextrose 40% Gel 15 Gram(s) Oral once PRN  glucagon  Injectable 1 milliGRAM(s) IntraMuscular once PRN      REVIEW OF SYSTEMS  --------------------------------------------------------------------------------  Gen: No fatigue, fevers/chills, weakness  Skin: No rashes  Head/Eyes/Ears/Mouth: No headache;No sore throat  Respiratory: No dyspnea, cough,   CV: No chest pain, PND, orthopnea  GI: No abdominal pain, diarrhea, constipation, nausea, vomiting  Transplant: No pain  : No increased frequency, dysuria, hematuria, nocturia  MSK: No joint pain/swelling; no back pain; no edema  Neuro: No dizziness/lightheadedness, weakness, seizures, numbness, tingling  Psych: No significant nervousness, anxiety, stress, depression    VITALS/PHYSICAL EXAM  --------------------------------------------------------------------------------  T(C): 36.6 (09-18-18 @ 05:49), Max: 37.1 (09-17-18 @ 18:15)  HR: 89 (09-18-18 @ 05:49) (78 - 98)  BP: 115/72 (09-18-18 @ 05:49) (115/72 - 139/77)  RR: 18 (09-18-18 @ 05:49) (18 - 20)  SpO2: 98% (09-18-18 @ 05:49) (97% - 100%)  Wt(kg): --        09-17-18 @ 07:01  -  09-18-18 @ 07:00  --------------------------------------------------------  IN: 2010 mL / OUT: 2370 mL / NET: -360 mL    09-18-18 @ 07:01  -  09-18-18 @ 10:48  --------------------------------------------------------  IN: 0 mL / OUT: 200 mL / NET: -200 mL      Physical Exam:  	Gen: NAD, well-appearing  	HEENT: PERRL, supple neck, clear oropharynx  	Pulm: CTA B/L  	CV: RRR, S1S2; no rub  	Back: No spinal or CVA tenderness; no sacral edema  	Abd: +BS, soft, nontender/nondistended. Multiple scars (ileostomy).               Transplant: No tenderness, swelling, nephrostomy in place.   	: No suprapubic tenderness  	UE: Warm, FROM, intact strength; no edema; no asterixis  	LE: Warm, FROM, intact strength; no edema  	Skin: Warm, without rashes  LABS/STUDIES  --------------------------------------------------------------------------------              9.4    3.0   >-----------<  137      [09-18-18 @ 05:54]              27.9     141  |  110  |  36  ----------------------------<  116      [09-18-18 @ 05:54]  4.9   |  18  |  4.21        Ca     9.9     [09-18-18 @ 05:54]      Mg     1.8     [09-18-18 @ 05:54]      Phos  3.6     [09-18-18 @ 05:54]        Creatinine Trend:  SCr 4.21 [09-18 @ 05:54]  SCr 3.67 [09-18 @ 02:18]  SCr 4.00 [09-17 @ 21:06]  SCr 3.80 [09-17 @ 14:52]  SCr 3.39 [09-17 @ 06:28]

## 2018-09-18 NOTE — PROGRESS NOTE ADULT - SUBJECTIVE AND OBJECTIVE BOX
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 Plan is for OR with urology for ureteral stent removal and new stent placement. If urology is unable to place the stent , IR plans to place the stent.   Dr. Mix discussed case with urology team.    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 Informed consent obtained. Pt verbalizes understanding.

## 2018-09-19 LAB
ANION GAP SERPL CALC-SCNC: 12 MMOL/L — SIGNIFICANT CHANGE UP (ref 5–17)
BUN SERPL-MCNC: 34 MG/DL — HIGH (ref 7–23)
CALCIUM SERPL-MCNC: 9.3 MG/DL — SIGNIFICANT CHANGE UP (ref 8.4–10.5)
CHLORIDE SERPL-SCNC: 107 MMOL/L — SIGNIFICANT CHANGE UP (ref 96–108)
CO2 SERPL-SCNC: 19 MMOL/L — LOW (ref 22–31)
CREAT SERPL-MCNC: 3.79 MG/DL — HIGH (ref 0.5–1.3)
CULTURE RESULTS: SIGNIFICANT CHANGE UP
GLUCOSE BLDC GLUCOMTR-MCNC: 126 MG/DL — HIGH (ref 70–99)
GLUCOSE BLDC GLUCOMTR-MCNC: 150 MG/DL — HIGH (ref 70–99)
GLUCOSE BLDC GLUCOMTR-MCNC: 172 MG/DL — HIGH (ref 70–99)
GLUCOSE BLDC GLUCOMTR-MCNC: 172 MG/DL — HIGH (ref 70–99)
GLUCOSE BLDC GLUCOMTR-MCNC: 238 MG/DL — HIGH (ref 70–99)
GLUCOSE SERPL-MCNC: 127 MG/DL — HIGH (ref 70–99)
HCT VFR BLD CALC: 25.8 % — LOW (ref 39–50)
HGB BLD-MCNC: 8.9 G/DL — LOW (ref 13–17)
MAGNESIUM SERPL-MCNC: 1.8 MG/DL — SIGNIFICANT CHANGE UP (ref 1.6–2.6)
MCHC RBC-ENTMCNC: 34.7 GM/DL — SIGNIFICANT CHANGE UP (ref 32–36)
MCHC RBC-ENTMCNC: 34.8 PG — HIGH (ref 27–34)
MCV RBC AUTO: 100 FL — SIGNIFICANT CHANGE UP (ref 80–100)
PHOSPHATE SERPL-MCNC: 3 MG/DL — SIGNIFICANT CHANGE UP (ref 2.5–4.5)
PLATELET # BLD AUTO: 134 K/UL — LOW (ref 150–400)
POTASSIUM SERPL-MCNC: 5 MMOL/L — SIGNIFICANT CHANGE UP (ref 3.5–5.3)
POTASSIUM SERPL-SCNC: 5 MMOL/L — SIGNIFICANT CHANGE UP (ref 3.5–5.3)
RBC # BLD: 2.57 M/UL — LOW (ref 4.2–5.8)
RBC # FLD: 17.6 % — HIGH (ref 10.3–14.5)
SODIUM SERPL-SCNC: 138 MMOL/L — SIGNIFICANT CHANGE UP (ref 135–145)
SPECIMEN SOURCE: SIGNIFICANT CHANGE UP
TACROLIMUS SERPL-MCNC: 9 NG/ML — SIGNIFICANT CHANGE UP
WBC # BLD: 3.3 K/UL — LOW (ref 3.8–10.5)
WBC # FLD AUTO: 3.3 K/UL — LOW (ref 3.8–10.5)

## 2018-09-19 PROCEDURE — 99232 SBSQ HOSP IP/OBS MODERATE 35: CPT | Mod: GC

## 2018-09-19 PROCEDURE — 50693 PLMT URETERAL STENT PRQ: CPT | Mod: RT

## 2018-09-19 RX ORDER — PIPERACILLIN AND TAZOBACTAM 4; .5 G/20ML; G/20ML
3.38 INJECTION, POWDER, LYOPHILIZED, FOR SOLUTION INTRAVENOUS EVERY 8 HOURS
Qty: 0 | Refills: 0 | Status: DISCONTINUED | OUTPATIENT
Start: 2018-09-19 | End: 2018-09-20

## 2018-09-19 RX ORDER — VALGANCICLOVIR 450 MG/1
450 TABLET, FILM COATED ORAL
Qty: 0 | Refills: 0 | Status: DISCONTINUED | OUTPATIENT
Start: 2018-09-19 | End: 2018-09-21

## 2018-09-19 RX ORDER — ONDANSETRON 8 MG/1
4 TABLET, FILM COATED ORAL ONCE
Qty: 0 | Refills: 0 | Status: DISCONTINUED | OUTPATIENT
Start: 2018-09-19 | End: 2018-09-21

## 2018-09-19 RX ORDER — MYCOPHENOLATE MOFETIL 250 MG/1
1000 CAPSULE ORAL
Qty: 0 | Refills: 0 | Status: DISCONTINUED | OUTPATIENT
Start: 2018-09-19 | End: 2018-09-21

## 2018-09-19 RX ORDER — HYDROMORPHONE HYDROCHLORIDE 2 MG/ML
0.5 INJECTION INTRAMUSCULAR; INTRAVENOUS; SUBCUTANEOUS
Qty: 0 | Refills: 0 | Status: DISCONTINUED | OUTPATIENT
Start: 2018-09-19 | End: 2018-09-21

## 2018-09-19 RX ORDER — MAGNESIUM SULFATE 500 MG/ML
2 VIAL (ML) INJECTION ONCE
Qty: 0 | Refills: 0 | Status: COMPLETED | OUTPATIENT
Start: 2018-09-19 | End: 2018-09-19

## 2018-09-19 RX ADMIN — Medication 100 MILLIGRAM(S): at 15:59

## 2018-09-19 RX ADMIN — Medication 1: at 12:47

## 2018-09-19 RX ADMIN — Medication 500000 UNIT(S): at 15:58

## 2018-09-19 RX ADMIN — Medication 500000 UNIT(S): at 22:38

## 2018-09-19 RX ADMIN — Medication 500000 UNIT(S): at 18:05

## 2018-09-19 RX ADMIN — Medication 500000 UNIT(S): at 06:12

## 2018-09-19 RX ADMIN — Medication 5 MILLIGRAM(S): at 06:12

## 2018-09-19 RX ADMIN — PIPERACILLIN AND TAZOBACTAM 25 GRAM(S): 4; .5 INJECTION, POWDER, LYOPHILIZED, FOR SOLUTION INTRAVENOUS at 22:39

## 2018-09-19 RX ADMIN — FAMOTIDINE 20 MILLIGRAM(S): 10 INJECTION INTRAVENOUS at 15:59

## 2018-09-19 RX ADMIN — MYCOPHENOLATE MOFETIL 1000 MILLIGRAM(S): 250 CAPSULE ORAL at 18:31

## 2018-09-19 RX ADMIN — MYCOPHENOLATE MOFETIL 500 MILLIGRAM(S): 250 CAPSULE ORAL at 06:12

## 2018-09-19 RX ADMIN — Medication 1: at 16:53

## 2018-09-19 RX ADMIN — TACROLIMUS 4 MILLIGRAM(S): 5 CAPSULE ORAL at 06:12

## 2018-09-19 RX ADMIN — TACROLIMUS 4 MILLIGRAM(S): 5 CAPSULE ORAL at 18:05

## 2018-09-19 RX ADMIN — TAMSULOSIN HYDROCHLORIDE 0.4 MILLIGRAM(S): 0.4 CAPSULE ORAL at 22:38

## 2018-09-19 RX ADMIN — Medication 500 MILLIGRAM(S): at 06:12

## 2018-09-19 RX ADMIN — GABAPENTIN 300 MILLIGRAM(S): 400 CAPSULE ORAL at 15:59

## 2018-09-19 RX ADMIN — Medication 50 GRAM(S): at 09:04

## 2018-09-19 RX ADMIN — ATOVAQUONE 1500 MILLIGRAM(S): 750 SUSPENSION ORAL at 15:59

## 2018-09-19 RX ADMIN — VALGANCICLOVIR 450 MILLIGRAM(S): 450 TABLET, FILM COATED ORAL at 16:01

## 2018-09-19 NOTE — PROGRESS NOTE ADULT - PROBLEM SELECTOR PLAN 2
Cont tacro for level 8-10, pred 5. Increase MMF to 1 g BID.   Resume valcyte.   C/W atovaquone and nystatin for prophylaxis.

## 2018-09-19 NOTE — BRIEF OPERATIVE NOTE - PROCEDURE
<<-----Click on this checkbox to enter Procedure Cystoscopy  09/19/2018  Removal of dislodged ureteral stent  Active  [id number]

## 2018-09-19 NOTE — CHART NOTE - NSCHARTNOTEFT_GEN_A_CORE
TRANSPLANT SURGERY POST-OP NOTE      SUBJECTIVE/ROS: Patient feels well  Denies nausea, vomiting, chest pain, shortness of breath         MEDICATIONS  (STANDING):  allopurinol 100 milliGRAM(s) Oral daily  atovaquone Suspension 1500 milliGRAM(s) Oral daily  dextrose 5%. 1000 milliLiter(s) (50 mL/Hr) IV Continuous <Continuous>  dextrose 50% Injectable 12.5 Gram(s) IV Push once  dextrose 50% Injectable 25 Gram(s) IV Push once  dextrose 50% Injectable 25 Gram(s) IV Push once  Epclusa (Sofosbuvir 400 and Velpatasvir) 1 Tablet(s) 1 Tablet(s) Oral daily  famotidine    Tablet 20 milliGRAM(s) Oral daily  gabapentin 300 milliGRAM(s) Oral daily  influenza   Vaccine 0.5 milliLiter(s) IntraMuscular once  insulin lispro (HumaLOG) corrective regimen sliding scale   SubCutaneous three times a day before meals  insulin lispro (HumaLOG) corrective regimen sliding scale   SubCutaneous at bedtime  mycophenolate mofetil 500 milliGRAM(s) Oral two times a day  mycophenolate mofetil 1000 milliGRAM(s) Oral two times a day  nystatin    Suspension 520963 Unit(s) Oral four times a day  predniSONE   Tablet 5 milliGRAM(s) Oral daily  sitaGLIPtin 25 milliGRAM(s) Oral daily  tacrolimus 4 milliGRAM(s) Oral two times a day  tamsulosin 0.4 milliGRAM(s) Oral at bedtime  valGANciclovir 450 milliGRAM(s) Oral <User Schedule>    MEDICATIONS  (PRN):  dextrose 40% Gel 15 Gram(s) Oral once PRN Blood Glucose LESS THAN 70 milliGRAM(s)/deciliter  glucagon  Injectable 1 milliGRAM(s) IntraMuscular once PRN Glucose LESS THAN 70 milligrams/deciliter  HYDROmorphone  Injectable 0.5 milliGRAM(s) IV Push every 10 minutes PRN Moderate Pain (4 - 6)  ondansetron Injectable 4 milliGRAM(s) IV Push once PRN Nausea and/or Vomiting      OBJECTIVE:    Vital Signs Last 24 Hrs  T(C): 36 (19 Sep 2018 12:30), Max: 37.2 (18 Sep 2018 17:50)  T(F): 96.8 (19 Sep 2018 12:30), Max: 99 (18 Sep 2018 17:50)  HR: 76 (19 Sep 2018 12:30) (76 - 91)  BP: 167/78 (19 Sep 2018 12:30) (125/74 - 174/85)  BP(mean): 112 (19 Sep 2018 12:30) (104 - 120)  RR: 15 (19 Sep 2018 12:30) (14 - 18)  SpO2: 100% (19 Sep 2018 12:30) (98% - 100%)        I&O's Detail    18 Sep 2018 07:01  -  19 Sep 2018 07:00  --------------------------------------------------------  IN:    Oral Fluid: 840 mL  Total IN: 840 mL    OUT:    Nephrostomy Tube: 1190 mL    Voided: 150 mL  Total OUT: 1340 mL    Total NET: -500 mL      19 Sep 2018 07:01  -  19 Sep 2018 14:21  --------------------------------------------------------  IN:  Total IN: 0 mL    OUT:    Nephrostomy Tube: 250 mL  Total OUT: 250 mL    Total NET: -250 mL          Daily     Daily     LABS:                        8.9    3.3   )-----------( 134      ( 19 Sep 2018 06:00 )             25.8     09-19    138  |  107  |  34<H>  ----------------------------<  127<H>  5.0   |  19<L>  |  3.79<H>    Ca    9.3      19 Sep 2018 06:00  Phos  3.0     09-19  Mg     1.8     09-19      PT/INR - ( 18 Sep 2018 12:29 )   PT: 10.9 sec;   INR: 1.00 ratio                       PHYSICAL EXAM:  Constitutional: well developed, well nourished, NAD      ASSESSMENT & PLAN:  67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 with delayed graft function, 8/13 biopsy revealing 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 found to have elevated Creatinine 5 and TRAS, a perinephric collection and hydronephrosis on renal ultrasound admitted for medical management of hyperkalemia now resolved s/p retrograde nephrostogram, placement of nephrostomy tube and drainage of perinephric collection S/P Nephrostogram which revealed distal ureteral stricture and stent coiled in bladder s/p  removal of dislodged ureteral stent (9/19)    - Stent replacement with IR  - Monitor I&O  - c/w immunosupression agents  - f/u AM labs      Vandana Mancuso PA-C TRANSPLANT SURGERY POST-OP NOTE             MEDICATIONS  (STANDING):  allopurinol 100 milliGRAM(s) Oral daily  atovaquone Suspension 1500 milliGRAM(s) Oral daily  dextrose 5%. 1000 milliLiter(s) (50 mL/Hr) IV Continuous <Continuous>  dextrose 50% Injectable 12.5 Gram(s) IV Push once  dextrose 50% Injectable 25 Gram(s) IV Push once  dextrose 50% Injectable 25 Gram(s) IV Push once  Epclusa (Sofosbuvir 400 and Velpatasvir) 1 Tablet(s) 1 Tablet(s) Oral daily  famotidine    Tablet 20 milliGRAM(s) Oral daily  gabapentin 300 milliGRAM(s) Oral daily  influenza   Vaccine 0.5 milliLiter(s) IntraMuscular once  insulin lispro (HumaLOG) corrective regimen sliding scale   SubCutaneous three times a day before meals  insulin lispro (HumaLOG) corrective regimen sliding scale   SubCutaneous at bedtime  mycophenolate mofetil 500 milliGRAM(s) Oral two times a day  mycophenolate mofetil 1000 milliGRAM(s) Oral two times a day  nystatin    Suspension 184136 Unit(s) Oral four times a day  predniSONE   Tablet 5 milliGRAM(s) Oral daily  sitaGLIPtin 25 milliGRAM(s) Oral daily  tacrolimus 4 milliGRAM(s) Oral two times a day  tamsulosin 0.4 milliGRAM(s) Oral at bedtime  valGANciclovir 450 milliGRAM(s) Oral <User Schedule>    MEDICATIONS  (PRN):  dextrose 40% Gel 15 Gram(s) Oral once PRN Blood Glucose LESS THAN 70 milliGRAM(s)/deciliter  glucagon  Injectable 1 milliGRAM(s) IntraMuscular once PRN Glucose LESS THAN 70 milligrams/deciliter  HYDROmorphone  Injectable 0.5 milliGRAM(s) IV Push every 10 minutes PRN Moderate Pain (4 - 6)  ondansetron Injectable 4 milliGRAM(s) IV Push once PRN Nausea and/or Vomiting      OBJECTIVE:    Vital Signs Last 24 Hrs  T(C): 36 (19 Sep 2018 12:30), Max: 37.2 (18 Sep 2018 17:50)  T(F): 96.8 (19 Sep 2018 12:30), Max: 99 (18 Sep 2018 17:50)  HR: 76 (19 Sep 2018 12:30) (76 - 91)  BP: 167/78 (19 Sep 2018 12:30) (125/74 - 174/85)  BP(mean): 112 (19 Sep 2018 12:30) (104 - 120)  RR: 15 (19 Sep 2018 12:30) (14 - 18)  SpO2: 100% (19 Sep 2018 12:30) (98% - 100%)        I&O's Detail    18 Sep 2018 07:01  -  19 Sep 2018 07:00  --------------------------------------------------------  IN:    Oral Fluid: 840 mL  Total IN: 840 mL    OUT:    Nephrostomy Tube: 1190 mL    Voided: 150 mL  Total OUT: 1340 mL    Total NET: -500 mL      19 Sep 2018 07:01  -  19 Sep 2018 14:21  --------------------------------------------------------  IN:  Total IN: 0 mL    OUT:    Nephrostomy Tube: 250 mL  Total OUT: 250 mL    Total NET: -250 mL          Daily     Daily     LABS:                        8.9    3.3   )-----------( 134      ( 19 Sep 2018 06:00 )             25.8     09-19    138  |  107  |  34<H>  ----------------------------<  127<H>  5.0   |  19<L>  |  3.79<H>    Ca    9.3      19 Sep 2018 06:00  Phos  3.0     09-19  Mg     1.8     09-19      PT/INR - ( 18 Sep 2018 12:29 )   PT: 10.9 sec;   INR: 1.00 ratio                       PHYSICAL EXAM:        ASSESSMENT & PLAN:  67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 with delayed graft function, 8/13 biopsy revealing 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 found to have elevated Creatinine 5 and TRAS, a perinephric collection and hydronephrosis on renal ultrasound admitted for medical management of hyperkalemia now resolved s/p retrograde nephrostogram, placement of nephrostomy tube and drainage of perinephric collection S/P Nephrostogram which revealed distal ureteral stricture and stent coiled in bladder s/p  removal of dislodged ureteral stent (9/19)    - Stent replacement with IR  - Monitor I&O  - c/w immunosupression agents  - f/u AM labs      Vandana Mancuso PA-C TRANSPLANT SURGERY POST-OP NOTE             MEDICATIONS  (STANDING):  allopurinol 100 milliGRAM(s) Oral daily  atovaquone Suspension 1500 milliGRAM(s) Oral daily  dextrose 5%. 1000 milliLiter(s) (50 mL/Hr) IV Continuous <Continuous>  dextrose 50% Injectable 12.5 Gram(s) IV Push once  dextrose 50% Injectable 25 Gram(s) IV Push once  dextrose 50% Injectable 25 Gram(s) IV Push once  Epclusa (Sofosbuvir 400 and Velpatasvir) 1 Tablet(s) 1 Tablet(s) Oral daily  famotidine    Tablet 20 milliGRAM(s) Oral daily  gabapentin 300 milliGRAM(s) Oral daily  influenza   Vaccine 0.5 milliLiter(s) IntraMuscular once  insulin lispro (HumaLOG) corrective regimen sliding scale   SubCutaneous three times a day before meals  insulin lispro (HumaLOG) corrective regimen sliding scale   SubCutaneous at bedtime  mycophenolate mofetil 500 milliGRAM(s) Oral two times a day  mycophenolate mofetil 1000 milliGRAM(s) Oral two times a day  nystatin    Suspension 804648 Unit(s) Oral four times a day  predniSONE   Tablet 5 milliGRAM(s) Oral daily  sitaGLIPtin 25 milliGRAM(s) Oral daily  tacrolimus 4 milliGRAM(s) Oral two times a day  tamsulosin 0.4 milliGRAM(s) Oral at bedtime  valGANciclovir 450 milliGRAM(s) Oral <User Schedule>    MEDICATIONS  (PRN):  dextrose 40% Gel 15 Gram(s) Oral once PRN Blood Glucose LESS THAN 70 milliGRAM(s)/deciliter  glucagon  Injectable 1 milliGRAM(s) IntraMuscular once PRN Glucose LESS THAN 70 milligrams/deciliter  HYDROmorphone  Injectable 0.5 milliGRAM(s) IV Push every 10 minutes PRN Moderate Pain (4 - 6)  ondansetron Injectable 4 milliGRAM(s) IV Push once PRN Nausea and/or Vomiting      OBJECTIVE:    Vital Signs Last 24 Hrs  T(C): 36 (19 Sep 2018 12:30), Max: 37.2 (18 Sep 2018 17:50)  T(F): 96.8 (19 Sep 2018 12:30), Max: 99 (18 Sep 2018 17:50)  HR: 76 (19 Sep 2018 12:30) (76 - 91)  BP: 167/78 (19 Sep 2018 12:30) (125/74 - 174/85)  BP(mean): 112 (19 Sep 2018 12:30) (104 - 120)  RR: 15 (19 Sep 2018 12:30) (14 - 18)  SpO2: 100% (19 Sep 2018 12:30) (98% - 100%)        I&O's Detail    18 Sep 2018 07:01  -  19 Sep 2018 07:00  --------------------------------------------------------  IN:    Oral Fluid: 840 mL  Total IN: 840 mL    OUT:    Nephrostomy Tube: 1190 mL    Voided: 150 mL  Total OUT: 1340 mL    Total NET: -500 mL      19 Sep 2018 07:01  -  19 Sep 2018 14:21  --------------------------------------------------------  IN:  Total IN: 0 mL    OUT:    Nephrostomy Tube: 250 mL  Total OUT: 250 mL    Total NET: -250 mL          Daily     Daily     LABS:                        8.9    3.3   )-----------( 134      ( 19 Sep 2018 06:00 )             25.8     09-19    138  |  107  |  34<H>  ----------------------------<  127<H>  5.0   |  19<L>  |  3.79<H>    Ca    9.3      19 Sep 2018 06:00  Phos  3.0     09-19  Mg     1.8     09-19      PT/INR - ( 18 Sep 2018 12:29 )   PT: 10.9 sec;   INR: 1.00 ratio               PHYSICAL EXAM:  General: Resting comfortably, NAD  Respiratory: No increased WOB  Abdomen: soft, non-tender, non-distended.  Well healed surgical scars.  R. 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    - Monitor I&O  - c/w immunosupression agents  - Reg diet as tolerated  - f/u AM labs      MICHELE GilesC

## 2018-09-19 NOTE — PROGRESS NOTE ADULT - SUBJECTIVE AND OBJECTIVE BOX
[facility] DIVISION OF KIDNEY DISEASES AND HYPERTENSION -- FOLLOW UP NOTE  --------------------------------------------------------------------------------  Chief Complaint:  DDRT 7/17/18  CHELA on CKD.     24 hour events/subjective:    Pt examined at bed side, not in distress. No overnight events.     PAST HISTORY  --------------------------------------------------------------------------------  No significant changes to PMH, PSH, FHx, SHx, unless otherwise noted    ALLERGIES & MEDICATIONS  --------------------------------------------------------------------------------  Allergies    No Known Allergies    Intolerances      Standing Inpatient Medications  allopurinol 100 milliGRAM(s) Oral daily  atovaquone Suspension 1500 milliGRAM(s) Oral daily  dextrose 5%. 1000 milliLiter(s) IV Continuous <Continuous>  dextrose 50% Injectable 12.5 Gram(s) IV Push once  dextrose 50% Injectable 25 Gram(s) IV Push once  dextrose 50% Injectable 25 Gram(s) IV Push once  Epclusa (Sofosbuvir 400 and Velpatasvir) 1 Tablet(s) 1 Tablet(s) Oral daily  famotidine    Tablet 20 milliGRAM(s) Oral daily  gabapentin 300 milliGRAM(s) Oral daily  influenza   Vaccine 0.5 milliLiter(s) IntraMuscular once  insulin lispro (HumaLOG) corrective regimen sliding scale   SubCutaneous three times a day before meals  insulin lispro (HumaLOG) corrective regimen sliding scale   SubCutaneous at bedtime  mycophenolate mofetil 500 milliGRAM(s) Oral two times a day  mycophenolate mofetil 1000 milliGRAM(s) Oral two times a day  nystatin    Suspension 397427 Unit(s) Oral four times a day  predniSONE   Tablet 5 milliGRAM(s) Oral daily  sitaGLIPtin 25 milliGRAM(s) Oral daily  tacrolimus 4 milliGRAM(s) Oral two times a day  tamsulosin 0.4 milliGRAM(s) Oral at bedtime  valGANciclovir 450 milliGRAM(s) Oral <User Schedule>    PRN Inpatient Medications  dextrose 40% Gel 15 Gram(s) Oral once PRN  glucagon  Injectable 1 milliGRAM(s) IntraMuscular once PRN      REVIEW OF SYSTEMS  --------------------------------------------------------------------------------  Gen: No weight changes, fatigue, fevers/chills, weakness  Skin: No rashes  Head/Eyes/Ears/Mouth: No headache; Normal hearing; Normal vision w/o blurriness; No sinus pain/discomfort, sore throat  Respiratory: No dyspnea, cough, wheezing, hemoptysis  CV: No chest pain, PND, orthopnea  GI: No abdominal pain, diarrhea, constipation, nausea, vomiting, melena, hematochezia  : No increased frequency, dysuria, hematuria, nocturia  MSK: No joint pain/swelling; no back pain; no edema  Neuro: No dizziness/lightheadedness, weakness, seizures, numbness, tingling  Heme: No easy bruising or bleeding  Endo: No heat/cold intolerance  Psych: No significant nervousness, anxiety, stress, depression    All other systems were reviewed and are negative, except as noted.    VITALS/PHYSICAL EXAM  --------------------------------------------------------------------------------  T(C): 37 (09-19-18 @ 09:38), Max: 37.2 (09-18-18 @ 12:33)  HR: 79 (09-19-18 @ 09:38) (79 - 91)  BP: 156/71 (09-19-18 @ 09:38) (125/74 - 156/71)  RR: 18 (09-19-18 @ 09:38) (18 - 18)  SpO2: 100% (09-19-18 @ 09:38) (98% - 100%)  Wt(kg): --        09-18-18 @ 07:01  -  09-19-18 @ 07:00  --------------------------------------------------------  IN: 840 mL / OUT: 1340 mL / NET: -500 mL      Physical Exam:  	Gen: NAD, well-appearing  	HEENT: PERRL, supple neck, clear oropharynx  	Pulm: CTA B/L  	CV: RRR, S1S2; no rub  	Back: No spinal or CVA tenderness; no sacral edema  	Abd: +BS, soft, nontender/nondistended  	: No suprapubic tenderness  	UE: Warm, FROM, no clubbing, intact strength; no edema; no asterixis  	LE: Warm, FROM, no clubbing, intact strength; no edema  	Neuro: No focal deficits, intact gait  	Psych: Normal affect and mood  	Skin: Warm, without rashes  	Vascular access:    LABS/STUDIES  --------------------------------------------------------------------------------              8.9    3.3   >-----------<  134      [09-19-18 @ 06:00]              25.8     138  |  107  |  34  ----------------------------<  127      [09-19-18 @ 06:00]  5.0   |  19  |  3.79        Ca     9.3     [09-19-18 @ 06:00]      Mg     1.8     [09-19-18 @ 06:00]      Phos  3.0     [09-19-18 @ 06:00]      PT/INR: PT 10.9 , INR 1.00       [09-18-18 @ 12:29]      Creatinine Trend:  SCr 3.79 [09-19 @ 06:00]  SCr 4.21 [09-18 @ 05:54]  SCr 3.67 [09-18 @ 02:18]  SCr 4.00 [09-17 @ 21:06]  SCr 3.80 [09-17 @ 14:52]    Urinalysis - [09-14-18 @ 20:35]      Color Yellow / Appearance Clear / SG 1.014 / pH 6.0      Gluc Negative / Ketone Negative  / Bili Negative / Urobili Negative       Blood Moderate / Protein 100 / Leuk Est Negative / Nitrite Negative      RBC 16 / WBC 6 / Hyaline 2 / Gran  / Sq Epi  / Non Sq Epi 4 / Bacteria Negative      HbA1c 6.0      [07-17-18 @ 16:53]

## 2018-09-19 NOTE — PROGRESS NOTE ADULT - PROBLEM SELECTOR PLAN 2
HCV + DDRT 7/17/18   Continue Nystatin, Tacrolimus and Prednisone.   Increase Cellcept to 1000mg BID. Start Valcyte today   Continue Epclusa for HCV treatment.   Bactrim placed on hold. Continue Mepron f/u G6PD and urine oxalate  Strict I/Os  Daily BMP. Monitor K+  Continue Allopurinol for uric acid crystals, Flomax, Pepcid  Will follow Daily Tacrolimus levels

## 2018-09-19 NOTE — PROGRESS NOTE ADULT - SUBJECTIVE AND OBJECTIVE BOX
Transplant Surgery - Multidisciplinary Rounds  --------------------------------------------------------------  DDRT 7/17/18    Present:  Patient seen with multidisciplinary team (Surgeon Dr. Castaneda,  Nephrologist, Abhishek Harrell pharmacist, Diana nurse, Ling nurse manager, Roxann Jorgensen NP, renal fellow MD)  in am rounds and examined with Dr. Castaneda.    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 CIT 22 hours.  Simulect induction. His post-operative course was complicated by delayed graft function requiring TIW HD and PO Lasix 80mg BID.   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  Patient's creatinine had been improving as an outpatient but recently found to have a creatine of 5. US revealed some concern for TRAS, perinephric collection and hydronephrosis. Patient was sent to IR for retrograde nephrostogram and placement of nephrostomy tube with drainage of perinephric collection.  On arrival to IR patient was found to be hyperkalemic to 6.7.  Patient was admitted for medical management of hyperkalemia with procedure delayed. Denies any symptoms, no N/V/D, no CP, no SOB. 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  No acute events overnight. S/P Nephrostogram 9/17 which revealed stent coiled in bladder and a distal ureteral stricture.  Creatinine 3.9 from 4.21.     Potential Discharge date  9/20    Education: stent and drain    Plan of care: see below    MEDICATIONS  (STANDING):  allopurinol 100 milliGRAM(s) Oral daily  atovaquone Suspension 1500 milliGRAM(s) Oral daily  dextrose 5%. 1000 milliLiter(s) (50 mL/Hr) IV Continuous <Continuous>  dextrose 50% Injectable 12.5 Gram(s) IV Push once  dextrose 50% Injectable 25 Gram(s) IV Push once  dextrose 50% Injectable 25 Gram(s) IV Push once  Epclusa (Sofosbuvir 400 and Velpatasvir) 1 Tablet(s) 1 Tablet(s) Oral daily  famotidine    Tablet 20 milliGRAM(s) Oral daily  gabapentin 300 milliGRAM(s) Oral daily  influenza   Vaccine 0.5 milliLiter(s) IntraMuscular once  insulin lispro (HumaLOG) corrective regimen sliding scale   SubCutaneous three times a day before meals  insulin lispro (HumaLOG) corrective regimen sliding scale   SubCutaneous at bedtime  mycophenolate mofetil 500 milliGRAM(s) Oral two times a day  mycophenolate mofetil 1000 milliGRAM(s) Oral two times a day  nystatin    Suspension 730780 Unit(s) Oral four times a day  predniSONE   Tablet 5 milliGRAM(s) Oral daily  sitaGLIPtin 25 milliGRAM(s) Oral daily  tacrolimus 4 milliGRAM(s) Oral two times a day  tamsulosin 0.4 milliGRAM(s) Oral at bedtime  valGANciclovir 450 milliGRAM(s) Oral <User Schedule>    MEDICATIONS  (PRN):  dextrose 40% Gel 15 Gram(s) Oral once PRN Blood Glucose LESS THAN 70 milliGRAM(s)/deciliter  glucagon  Injectable 1 milliGRAM(s) IntraMuscular once PRN Glucose LESS THAN 70 milligrams/deciliter      PAST MEDICAL & SURGICAL HISTORY:  ESRD (end stage renal disease) on dialysis  Kidney cancer, primary, with metastasis from kidney to other site: with no mets  HTN (hypertension)  DM (diabetes mellitus)  S/p nephrectomy: right 12/10/2015  S/p nephrectomy: left 9/15/2015  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      Vital Signs Last 24 Hrs  T(C): 37 (19 Sep 2018 09:38), Max: 37.2 (18 Sep 2018 12:33)  T(F): 98.6 (19 Sep 2018 09:38), Max: 99 (18 Sep 2018 17:50)  HR: 79 (19 Sep 2018 09:38) (79 - 91)  BP: 156/71 (19 Sep 2018 09:38) (125/74 - 156/71)  BP(mean): --  RR: 18 (19 Sep 2018 09:38) (18 - 18)  SpO2: 100% (19 Sep 2018 09:38) (98% - 100%)    I&O's Summary    18 Sep 2018 07:01  -  19 Sep 2018 07:00  --------------------------------------------------------  IN: 840 mL / OUT: 1340 mL / NET: -500 mL                              8.9    3.3   )-----------( 134      ( 19 Sep 2018 06:00 )             25.8     09-19    138  |  107  |  34<H>  ----------------------------<  127<H>  5.0   |  19<L>  |  3.79<H>    Ca    9.3      19 Sep 2018 06:00  Phos  3.0     09-19  Mg     1.8     09-19      Tacrolimus (), Serum: 10.9 ng/mL (09-18 @ 07:54)    Review of systems  Gen: No weight changes, fatigue, fevers/chills, weakness  Skin: No rashes  Head/Eyes/Ears/Mouth: No headache; Normal hearing; Normal vision w/o blurriness; No sinus pain/discomfort, sore throat  Respiratory: No dyspnea, cough, wheezing, hemoptysis  CV: No chest pain, PND, orthopnea  GI: No abdominal pain, diarrhea, constipation, nausea, vomiting, melena, hematochezia  : No increased frequency, dysuria, hematuria, nocturia  MSK: No joint pain/swelling; no back pain; no edema  Neuro: No dizziness/lightheadedness, weakness, seizures, numbness, tingling  Heme: No easy bruising or bleeding  Endo: No heat/cold intolerance  Psych: No significant nervousness, anxiety, stress, depression  All other systems were reviewed and are negative, except as noted.    PHYSICAL EXAM:  Constitutional: Well developed / well nourished  Eyes: Anicteric, PERRLA  ENMT: nc/at  Neck: supple  Respiratory: CTA B/L  Cardiovascular: RRR  Gastrointestinal: Soft abdomen, NT, ND  Genitourinary: Voiding spontaneously. Nephrostomy drain in situ with pink colored urine  Extremities: SCD's in place and working bilaterally  Vascular: Palpable dp pulses bilaterally  Neurological: A&O x3  Skin: Wound well healed   Musculoskeletal: Moving all extremities  Psychiatric: Responsive

## 2018-09-19 NOTE — PROCEDURE NOTE - GENERAL PROCEDURE DETAILS
placement of 7 fr x 12 cm transplant ureteral stent and exchanged indwelling 8.5 fr transplant nephrostomy tube. nephrostomy tube placed to bag drainage.

## 2018-09-19 NOTE — CHART NOTE - NSCHARTNOTEFT_GEN_A_CORE
General Surgery Post-Op Note    SUBJECTIVE:  This is a 67y Male PPD s/p IR guided placement of 7fr transplant ureteral stent and exchange of indwelling 8.5fr transplant nephrostomy tube, nephrostomy tube placed to bag drainage. Doing well overall. +flatus/+BM, denies N/V. Phyllis reg diet.     OBJECTIVE:     ** VITAL SIGNS / I&O's **    Vital Signs Last 24 Hrs  T(C): 36.7 (19 Sep 2018 21:37), Max: 37 (19 Sep 2018 00:19)  T(F): 98.1 (19 Sep 2018 21:37), Max: 98.6 (19 Sep 2018 00:19)  HR: 82 (19 Sep 2018 21:37) (70 - 87)  BP: 121/66 (19 Sep 2018 21:37) (121/66 - 174/85)  BP(mean): 112 (19 Sep 2018 12:30) (104 - 120)  RR: 18 (19 Sep 2018 21:37) (14 - 18)  SpO2: 99% (19 Sep 2018 21:37) (99% - 100%)      18 Sep 2018 07:01  -  19 Sep 2018 07:00  --------------------------------------------------------  IN:    Oral Fluid: 840 mL  Total IN: 840 mL    OUT:    Nephrostomy Tube: 1190 mL    Voided: 150 mL  Total OUT: 1340 mL    Total NET: -500 mL      19 Sep 2018 07:01  -  19 Sep 2018 23:26  --------------------------------------------------------  IN:    Oral Fluid: 600 mL  Total IN: 600 mL    OUT:    Nephrostomy Tube: 475 mL    Voided: 100 mL  Total OUT: 575 mL    Total NET: 25 mL          ** PHYSICAL EXAM **    -- CONSTITUTIONAL: Alert, in NAD  -- PULMONARY: non-labored respirations  -- ABDOMEN: soft, non-distended, non-TTP, nephrostomy tube at RLQ present.  -- NEURO: A&Ox3    ** LABS **                          8.9    3.3   )-----------( 134      ( 19 Sep 2018 06:00 )             25.8     19 Sep 2018 06:00    138    |  107    |  34     ----------------------------<  127    5.0     |  19     |  3.79     Ca    9.3        19 Sep 2018 06:00  Phos  3.0       19 Sep 2018 06:00  Mg     1.8       19 Sep 2018 06:00      PT/INR - ( 18 Sep 2018 12:29 )   PT: 10.9 sec;   INR: 1.00 ratio           CAPILLARY BLOOD GLUCOSE      POCT Blood Glucose.: 238 mg/dL (19 Sep 2018 21:36)  POCT Blood Glucose.: 172 mg/dL (19 Sep 2018 16:35)  POCT Blood Glucose.: 172 mg/dL (19 Sep 2018 12:28)  POCT Blood Glucose.: 126 mg/dL (19 Sep 2018 05:45)  POCT Blood Glucose.: 150 mg/dL (19 Sep 2018 00:10)                MEDICATIONS  (STANDING):  allopurinol 100 milliGRAM(s) Oral daily  atovaquone Suspension 1500 milliGRAM(s) Oral daily  dextrose 5%. 1000 milliLiter(s) (50 mL/Hr) IV Continuous <Continuous>  dextrose 50% Injectable 12.5 Gram(s) IV Push once  dextrose 50% Injectable 25 Gram(s) IV Push once  dextrose 50% Injectable 25 Gram(s) IV Push once  Epclusa (Sofosbuvir 400 and Velpatasvir) 1 Tablet(s) 1 Tablet(s) Oral daily  famotidine    Tablet 20 milliGRAM(s) Oral daily  gabapentin 300 milliGRAM(s) Oral daily  influenza   Vaccine 0.5 milliLiter(s) IntraMuscular once  insulin lispro (HumaLOG) corrective regimen sliding scale   SubCutaneous three times a day before meals  insulin lispro (HumaLOG) corrective regimen sliding scale   SubCutaneous at bedtime  mycophenolate mofetil 1000 milliGRAM(s) Oral two times a day  nystatin    Suspension 986191 Unit(s) Oral four times a day  piperacillin/tazobactam IVPB. 3.375 Gram(s) IV Intermittent every 8 hours  predniSONE   Tablet 5 milliGRAM(s) Oral daily  sitaGLIPtin 25 milliGRAM(s) Oral daily  tacrolimus 4 milliGRAM(s) Oral two times a day  tamsulosin 0.4 milliGRAM(s) Oral at bedtime  valGANciclovir 450 milliGRAM(s) Oral <User Schedule>    MEDICATIONS  (PRN):  dextrose 40% Gel 15 Gram(s) Oral once PRN Blood Glucose LESS THAN 70 milliGRAM(s)/deciliter  glucagon  Injectable 1 milliGRAM(s) IntraMuscular once PRN Glucose LESS THAN 70 milligrams/deciliter  HYDROmorphone  Injectable 0.5 milliGRAM(s) IV Push every 10 minutes PRN Moderate Pain (4 - 6)  ondansetron Injectable 4 milliGRAM(s) IV Push once PRN Nausea and/or Vomiting        Assessment:  This is a 67yMale PPD 0 s/p IR guided placement of 7fr transplant ureteral stent and exchange of indwelling 8.5fr transplant nephrostomy tube, nephrostomy tube placed to bag drainage.    Plan:  - Diet: Reg  - Monitor nephrostomy tube   - OOB, ambulate as tolerated  - Encourage use of IS  - Monitor dressings  - DVT ppx      Blue Team General Surgery [phone]

## 2018-09-20 ENCOUNTER — APPOINTMENT (OUTPATIENT)
Dept: TRANSPLANT | Facility: CLINIC | Age: 68
End: 2018-09-20

## 2018-09-20 LAB
ANION GAP SERPL CALC-SCNC: 12 MMOL/L — SIGNIFICANT CHANGE UP (ref 5–17)
BUN SERPL-MCNC: 38 MG/DL — HIGH (ref 7–23)
CALCIUM SERPL-MCNC: 9.5 MG/DL — SIGNIFICANT CHANGE UP (ref 8.4–10.5)
CHLORIDE SERPL-SCNC: 107 MMOL/L — SIGNIFICANT CHANGE UP (ref 96–108)
CO2 SERPL-SCNC: 18 MMOL/L — LOW (ref 22–31)
CREAT SERPL-MCNC: 3.87 MG/DL — HIGH (ref 0.5–1.3)
G6PD RBC-CCNC: 14.9 U/G HGB — SIGNIFICANT CHANGE UP (ref 7–20.5)
GLUCOSE BLDC GLUCOMTR-MCNC: 140 MG/DL — HIGH (ref 70–99)
GLUCOSE BLDC GLUCOMTR-MCNC: 190 MG/DL — HIGH (ref 70–99)
GLUCOSE BLDC GLUCOMTR-MCNC: 190 MG/DL — HIGH (ref 70–99)
GLUCOSE BLDC GLUCOMTR-MCNC: 231 MG/DL — HIGH (ref 70–99)
GLUCOSE SERPL-MCNC: 153 MG/DL — HIGH (ref 70–99)
HCT VFR BLD CALC: 26.8 % — LOW (ref 39–50)
HGB BLD-MCNC: 9.1 G/DL — LOW (ref 13–17)
MAGNESIUM SERPL-MCNC: 1.9 MG/DL — SIGNIFICANT CHANGE UP (ref 1.6–2.6)
MCHC RBC-ENTMCNC: 34 GM/DL — SIGNIFICANT CHANGE UP (ref 32–36)
MCHC RBC-ENTMCNC: 34 PG — SIGNIFICANT CHANGE UP (ref 27–34)
MCV RBC AUTO: 99.9 FL — SIGNIFICANT CHANGE UP (ref 80–100)
PHOSPHATE SERPL-MCNC: 2.8 MG/DL — SIGNIFICANT CHANGE UP (ref 2.5–4.5)
PLATELET # BLD AUTO: 154 K/UL — SIGNIFICANT CHANGE UP (ref 150–400)
POTASSIUM SERPL-MCNC: 5.4 MMOL/L — HIGH (ref 3.5–5.3)
POTASSIUM SERPL-SCNC: 5.4 MMOL/L — HIGH (ref 3.5–5.3)
RBC # BLD: 2.68 M/UL — LOW (ref 4.2–5.8)
RBC # FLD: 17.3 % — HIGH (ref 10.3–14.5)
SODIUM SERPL-SCNC: 137 MMOL/L — SIGNIFICANT CHANGE UP (ref 135–145)
TACROLIMUS SERPL-MCNC: 11 NG/ML — SIGNIFICANT CHANGE UP
WBC # BLD: 4.4 K/UL — SIGNIFICANT CHANGE UP (ref 3.8–10.5)
WBC # FLD AUTO: 4.4 K/UL — SIGNIFICANT CHANGE UP (ref 3.8–10.5)

## 2018-09-20 PROCEDURE — 99232 SBSQ HOSP IP/OBS MODERATE 35: CPT | Mod: GC

## 2018-09-20 RX ORDER — MAGNESIUM SULFATE 500 MG/ML
2 VIAL (ML) INJECTION ONCE
Qty: 0 | Refills: 0 | Status: COMPLETED | OUTPATIENT
Start: 2018-09-20 | End: 2018-09-20

## 2018-09-20 RX ADMIN — ATOVAQUONE 1500 MILLIGRAM(S): 750 SUSPENSION ORAL at 16:52

## 2018-09-20 RX ADMIN — TACROLIMUS 4 MILLIGRAM(S): 5 CAPSULE ORAL at 05:56

## 2018-09-20 RX ADMIN — PIPERACILLIN AND TAZOBACTAM 25 GRAM(S): 4; .5 INJECTION, POWDER, LYOPHILIZED, FOR SOLUTION INTRAVENOUS at 14:54

## 2018-09-20 RX ADMIN — Medication 2: at 12:34

## 2018-09-20 RX ADMIN — GABAPENTIN 300 MILLIGRAM(S): 400 CAPSULE ORAL at 12:31

## 2018-09-20 RX ADMIN — MYCOPHENOLATE MOFETIL 1000 MILLIGRAM(S): 250 CAPSULE ORAL at 05:57

## 2018-09-20 RX ADMIN — PIPERACILLIN AND TAZOBACTAM 25 GRAM(S): 4; .5 INJECTION, POWDER, LYOPHILIZED, FOR SOLUTION INTRAVENOUS at 05:57

## 2018-09-20 RX ADMIN — FAMOTIDINE 20 MILLIGRAM(S): 10 INJECTION INTRAVENOUS at 12:31

## 2018-09-20 RX ADMIN — Medication 500000 UNIT(S): at 17:56

## 2018-09-20 RX ADMIN — Medication 1: at 16:53

## 2018-09-20 RX ADMIN — Medication 50 GRAM(S): at 12:34

## 2018-09-20 RX ADMIN — Medication 500000 UNIT(S): at 06:08

## 2018-09-20 RX ADMIN — TACROLIMUS 4 MILLIGRAM(S): 5 CAPSULE ORAL at 17:56

## 2018-09-20 RX ADMIN — MYCOPHENOLATE MOFETIL 1000 MILLIGRAM(S): 250 CAPSULE ORAL at 17:57

## 2018-09-20 RX ADMIN — Medication 500000 UNIT(S): at 23:20

## 2018-09-20 RX ADMIN — Medication 5 MILLIGRAM(S): at 05:57

## 2018-09-20 RX ADMIN — Medication 100 MILLIGRAM(S): at 12:31

## 2018-09-20 RX ADMIN — Medication 500000 UNIT(S): at 12:31

## 2018-09-20 RX ADMIN — TAMSULOSIN HYDROCHLORIDE 0.4 MILLIGRAM(S): 0.4 CAPSULE ORAL at 21:20

## 2018-09-20 NOTE — CHART NOTE - NSCHARTNOTEFT_GEN_A_CORE
Pt lost IV access refusing replacement of new IV. Only IV medication getting is Zosyn. Discussed with Dr. Heath patient no longer needs Abx, ok for patient not to have access overnight plan to discharge patient in am.   Transplant Sx [phone]

## 2018-09-20 NOTE — PROGRESS NOTE ADULT - PROBLEM SELECTOR PLAN 2
Cont tacro for level 8-10, pred 5. Increase MMF to 1 g BID.   C/W Valcyte,  atovaquone and nystatin for prophylaxis.

## 2018-09-20 NOTE — PROGRESS NOTE ADULT - SUBJECTIVE AND OBJECTIVE BOX
[facility] DIVISION OF KIDNEY DISEASES AND HYPERTENSION -- FOLLOW UP NOTE  --------------------------------------------------------------------------------  Chief Complaint:  DDRT 7/17/18  CHELA on CKD.     24 hour events/subjective:  Pt examined at bed side, not in distress. Had ureteral stent placed yesterday, and removal of previous  dislodge stent. Nephrostomy was capped. Urine output was 460cc recorded since 10 pm.     PAST HISTORY  --------------------------------------------------------------------------------  No significant changes to PMH, PSH, FHx, SHx, unless otherwise noted    ALLERGIES & MEDICATIONS  --------------------------------------------------------------------------------  Allergies    No Known Allergies    Intolerances      Standing Inpatient Medications  allopurinol 100 milliGRAM(s) Oral daily  atovaquone Suspension 1500 milliGRAM(s) Oral daily  dextrose 5%. 1000 milliLiter(s) IV Continuous <Continuous>  dextrose 50% Injectable 12.5 Gram(s) IV Push once  dextrose 50% Injectable 25 Gram(s) IV Push once  dextrose 50% Injectable 25 Gram(s) IV Push once  Epclusa (Sofosbuvir 400 and Velpatasvir) 1 Tablet(s) 1 Tablet(s) Oral daily  famotidine    Tablet 20 milliGRAM(s) Oral daily  gabapentin 300 milliGRAM(s) Oral daily  influenza   Vaccine 0.5 milliLiter(s) IntraMuscular once  insulin lispro (HumaLOG) corrective regimen sliding scale   SubCutaneous three times a day before meals  insulin lispro (HumaLOG) corrective regimen sliding scale   SubCutaneous at bedtime  magnesium sulfate  IVPB 2 Gram(s) IV Intermittent once  mycophenolate mofetil 1000 milliGRAM(s) Oral two times a day  nystatin    Suspension 077790 Unit(s) Oral four times a day  piperacillin/tazobactam IVPB. 3.375 Gram(s) IV Intermittent every 8 hours  predniSONE   Tablet 5 milliGRAM(s) Oral daily  sitaGLIPtin 25 milliGRAM(s) Oral daily  tacrolimus 4 milliGRAM(s) Oral two times a day  tamsulosin 0.4 milliGRAM(s) Oral at bedtime  valGANciclovir 450 milliGRAM(s) Oral <User Schedule>    PRN Inpatient Medications  dextrose 40% Gel 15 Gram(s) Oral once PRN  glucagon  Injectable 1 milliGRAM(s) IntraMuscular once PRN  HYDROmorphone  Injectable 0.5 milliGRAM(s) IV Push every 10 minutes PRN  ondansetron Injectable 4 milliGRAM(s) IV Push once PRN      REVIEW OF SYSTEMS  --------------------------------------------------------------------------------  Gen: No weight changes, fatigue, fevers/chills, weakness  Skin: No rashes  Head/Eyes/Ears/Mouth: No headache; Normal hearing; Normal vision w/o blurriness; No sinus pain/discomfort, sore throat  Respiratory: No dyspnea, cough, wheezing, hemoptysis  CV: No chest pain, PND, orthopnea  GI: No abdominal pain, diarrhea, constipation, nausea, vomiting, melena, hematochezia  : No increased frequency, dysuria, hematuria, nocturia  MSK: No joint pain/swelling; no back pain; no edema  Neuro: No dizziness/lightheadedness, weakness, seizures, numbness, tingling  Heme: No easy bruising or bleeding  Endo: No heat/cold intolerance  Psych: No significant nervousness, anxiety, stress, depression      VITALS/PHYSICAL EXAM  --------------------------------------------------------------------------------  T(C): 37.1 (09-20-18 @ 05:47), Max: 37.1 (09-20-18 @ 05:47)  HR: 77 (09-20-18 @ 05:47) (70 - 87)  BP: 135/77 (09-20-18 @ 05:47) (121/66 - 174/85)  RR: 18 (09-20-18 @ 05:47) (14 - 18)  SpO2: 100% (09-20-18 @ 05:47) (99% - 100%)  Wt(kg): --        09-19-18 @ 07:01  -  09-20-18 @ 07:00  --------------------------------------------------------  IN: 720 mL / OUT: 935 mL / NET: -215 mL    09-20-18 @ 07:01  -  09-20-18 @ 10:48  --------------------------------------------------------  IN: 340 mL / OUT: 100 mL / NET: 240 mL      Physical Exam:  	Gen: NAD, well-appearing  	HEENT: PERRL, supple neck, clear oropharynx  	Pulm: CTA B/L  	CV: RRR, S1S2; no rub  	Back: No spinal or CVA tenderness; no sacral edema  	Abd: +BS, soft, nontender/nondistended. Multiple scars (ileostomy). Nephrostomy capped.               Transplant: No tenderness, swelling, nephrostomy in place.   	: No suprapubic tenderness. No armenta.   	UE: Warm, FROM, intact strength; no edema; no asterixis  	LE: Warm, FROM, intact strength; no edema  	Skin: Warm, without rashes    LABS/STUDIES  --------------------------------------------------------------------------------              9.1    4.4   >-----------<  154      [09-20-18 @ 06:07]              26.8     137  |  107  |  38  ----------------------------<  153      [09-20-18 @ 06:07]  5.4   |  18  |  3.87        Ca     9.5     [09-20-18 @ 06:07]      Mg     1.9     [09-20-18 @ 06:07]      Phos  2.8     [09-20-18 @ 06:07]      PT/INR: PT 10.9 , INR 1.00       [09-18-18 @ 12:29]    Creatinine Trend:  SCr 3.87 [09-20 @ 06:07]  SCr 3.79 [09-19 @ 06:00]  SCr 4.21 [09-18 @ 05:54]  SCr 3.67 [09-18 @ 02:18]  SCr 4.00 [09-17 @ 21:06]

## 2018-09-20 NOTE — PROGRESS NOTE ADULT - SUBJECTIVE AND OBJECTIVE BOX
Transplant Surgery - Multidisciplinary Rounds  --------------------------------------------------------------  DDRT 7/17/18    Present:  Patient seen with multidisciplinary team (Surgeon Dr. Castaneda,  Nephrologist, Abhishek Harrell pharmacist, Diana nurse, Ling nurse manager, Roxann Jorgensen NP, renal fellow MD)  in am rounds and examined with Dr. Castaneda.    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 CIT 22 hours.  Simulect induction. His post-operative course was complicated by delayed graft function requiring TIW HD and PO Lasix 80mg BID.   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  Patient's creatinine had been improving as an outpatient but recently found to have a creatine of 5. US revealed some concern for TRAS, perinephric collection and hydronephrosis. Patient was sent to IR for retrograde nephrostogram and placement of nephrostomy tube with drainage of perinephric collection.  On arrival to IR patient was found to be hyperkalemic to 6.7.  Patient was admitted for medical management of hyperkalemia with procedure delayed. Denies any symptoms, no N/V/D, no CP, no SOB. 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  No acute events overnight. OR yesterday for cystoscopy and removal of migrated stent.  IR for placement of 7Fr x 12cm stent and exchange of nephrostomy tube.  Tolerated well. Zosyn started. Nephrostomy capped last night, voiding. Cellcept increased to 1g BID and Valcyte restarted.     Potential Discharge date  9/21    Education: urine collection    Plan of care: see below      MEDICATIONS  (STANDING):  allopurinol 100 milliGRAM(s) Oral daily  atovaquone Suspension 1500 milliGRAM(s) Oral daily  dextrose 5%. 1000 milliLiter(s) (50 mL/Hr) IV Continuous <Continuous>  dextrose 50% Injectable 12.5 Gram(s) IV Push once  dextrose 50% Injectable 25 Gram(s) IV Push once  dextrose 50% Injectable 25 Gram(s) IV Push once  Epclusa (Sofosbuvir 400 and Velpatasvir) 1 Tablet(s) 1 Tablet(s) Oral daily  famotidine    Tablet 20 milliGRAM(s) Oral daily  gabapentin 300 milliGRAM(s) Oral daily  influenza   Vaccine 0.5 milliLiter(s) IntraMuscular once  insulin lispro (HumaLOG) corrective regimen sliding scale   SubCutaneous three times a day before meals  insulin lispro (HumaLOG) corrective regimen sliding scale   SubCutaneous at bedtime  magnesium sulfate  IVPB 2 Gram(s) IV Intermittent once  mycophenolate mofetil 1000 milliGRAM(s) Oral two times a day  nystatin    Suspension 079666 Unit(s) Oral four times a day  piperacillin/tazobactam IVPB. 3.375 Gram(s) IV Intermittent every 8 hours  predniSONE   Tablet 5 milliGRAM(s) Oral daily  sitaGLIPtin 25 milliGRAM(s) Oral daily  tacrolimus 4 milliGRAM(s) Oral two times a day  tamsulosin 0.4 milliGRAM(s) Oral at bedtime  valGANciclovir 450 milliGRAM(s) Oral <User Schedule>    MEDICATIONS  (PRN):  dextrose 40% Gel 15 Gram(s) Oral once PRN Blood Glucose LESS THAN 70 milliGRAM(s)/deciliter  glucagon  Injectable 1 milliGRAM(s) IntraMuscular once PRN Glucose LESS THAN 70 milligrams/deciliter  HYDROmorphone  Injectable 0.5 milliGRAM(s) IV Push every 10 minutes PRN Moderate Pain (4 - 6)  ondansetron Injectable 4 milliGRAM(s) IV Push once PRN Nausea and/or Vomiting      PAST MEDICAL & SURGICAL HISTORY:  ESRD (end stage renal disease) on dialysis  Kidney cancer, primary, with metastasis from kidney to other site: with no mets  HTN (hypertension)  DM (diabetes mellitus)  S/p nephrectomy: right 12/10/2015  S/p nephrectomy: left 9/15/2015  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      Vital Signs Last 24 Hrs  T(C): 37.1 (20 Sep 2018 05:47), Max: 37.1 (20 Sep 2018 05:47)  T(F): 98.7 (20 Sep 2018 05:47), Max: 98.7 (20 Sep 2018 05:47)  HR: 77 (20 Sep 2018 05:47) (70 - 87)  BP: 135/77 (20 Sep 2018 05:47) (121/66 - 174/85)  BP(mean): 112 (19 Sep 2018 12:30) (104 - 120)  RR: 18 (20 Sep 2018 05:47) (14 - 18)  SpO2: 100% (20 Sep 2018 05:47) (99% - 100%)    I&O's Summary    19 Sep 2018 07:01  -  20 Sep 2018 07:00  --------------------------------------------------------  IN: 720 mL / OUT: 935 mL / NET: -215 mL    20 Sep 2018 07:01  -  20 Sep 2018 10:30  --------------------------------------------------------  IN: 340 mL / OUT: 100 mL / NET: 240 mL                              9.1    4.4   )-----------( 154      ( 20 Sep 2018 06:07 )             26.8     09-20    137  |  107  |  38<H>  ----------------------------<  153<H>  5.4<H>   |  18<L>  |  3.87<H>    Ca    9.5      20 Sep 2018 06:07  Phos  2.8     09-20  Mg     1.9     09-20      Tacrolimus (), Serum: 11.0 ng/mL (09-20 @ 07:45)    Review of systems  Gen: No weight changes, fatigue, fevers/chills, weakness  Skin: No rashes  Head/Eyes/Ears/Mouth: No headache; Normal hearing; Normal vision w/o blurriness; No sinus pain/discomfort, sore throat  Respiratory: No dyspnea, cough, wheezing, hemoptysis  CV: No chest pain, PND, orthopnea  GI: No abdominal pain, diarrhea, constipation, nausea, vomiting, melena, hematochezia  : No increased frequency, dysuria, hematuria, nocturia  MSK: No joint pain/swelling; no back pain; no edema  Neuro: No dizziness/lightheadedness, weakness, seizures, numbness, tingling  Heme: No easy bruising or bleeding  Endo: No heat/cold intolerance  Psych: No significant nervousness, anxiety, stress, depression  All other systems were reviewed and are negative, except as noted.    PHYSICAL EXAM:  Constitutional: Well developed / well nourished  Eyes: Anicteric, PERRLA  ENMT: nc/at  Neck: supple  Respiratory: CTA B/L  Cardiovascular: RRR  Gastrointestinal: Soft abdomen, NT, ND  Genitourinary: Voiding. Nephrostomy drain capped  Extremities: SCD's in place and working bilaterally  Vascular: Palpable dp pulses bilaterally  Neurological: A&O x3  Skin: Wound well healed   Musculoskeletal: Moving all extremities  Psychiatric: Responsive

## 2018-09-20 NOTE — PROGRESS NOTE ADULT - PROBLEM SELECTOR PLAN 2
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  Start 24 hour urine collection for oxalate, calcium and creatinine.   Continue Nystatin, Tacrolimus, Prednisone, Cellcept, Valcyte TIW   Continue Epclusa for HCV treatment.   Bactrim placed on hold. Continue Mepron  Strict I/Os  Daily BMP. Monitor K+  Continue Allopurinol for uric acid crystals, Flomax, Pepcid  FU daily Tacrolimus levels.

## 2018-09-20 NOTE — PROGRESS NOTE ADULT - SUBJECTIVE AND OBJECTIVE BOX
Interventional Radiology Follow- Up Note      This is a 67y Male s/p HCV + donor DDRT and double J stent on 7/17/2018 found to have right hydronephrosis s/p nephrostomy tube on 9/14 on 9/17 nephrostogram showed distal ureteral stricture and coiled ureteral stent in the bladder. Pt underwent stent retrieval by Urology and new ureteral stent placement in IR on 9/19 with Dr. Lopez. Nephrostomy capped overnight, tolerating well. Pt denies abdominal pain urine output 560cc since capping.     PAST MEDICAL & SURGICAL HISTORY:  ESRD (end stage renal disease) on dialysis  Kidney cancer, primary, with metastasis from kidney to other site: with no mets  HTN (hypertension)  DM (diabetes mellitus)  S/p nephrectomy: right 12/10/2015  S/p nephrectomy: left 9/15/2015  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      Allergies: No Known Allergies    LABS:                        9.1    4.4   )-----------( 154      ( 20 Sep 2018 06:07 )             26.8     09-20    137  |  107  |  38<H>  ----------------------------<  153<H>  5.4<H>   |  18<L>  |  3.87<H>    Ca    9.5      20 Sep 2018 06:07  Phos  2.8     09-20  Mg     1.9     09-20      PT/INR - ( 18 Sep 2018 12:29 )   PT: 10.9 sec;   INR: 1.00 ratio      Vitals: T(F): 98.6 (09-20-18 @ 11:00), Max: 98.7 (09-20-18 @ 05:47)  HR: 84 (09-20-18 @ 11:00) (70 - 87)  BP: 141/76 (09-20-18 @ 11:00) (121/66 - 167/78)  RR: 18 (09-20-18 @ 11:00) (15 - 18)  SpO2: 100% (09-20-18 @ 11:00) (99% - 100%)    PHYSICAL EXAM:  General: Nontoxic, in NAD, A&O x3  Abd: Nephrostomy tube intact and capped.       A/P: 67y Male admitted with h/o renal transplant most recently s/p ureteral stent retrieval and new stent placement.   -Continue care per primary team  -flush drain per doctor orders  -trend vitals, labs  -will discuss with IR attending     Please call IR at extension [phone] with any questions, concerns, or issues regarding above.

## 2018-09-21 VITALS
DIASTOLIC BLOOD PRESSURE: 76 MMHG | TEMPERATURE: 99 F | HEART RATE: 87 BPM | SYSTOLIC BLOOD PRESSURE: 131 MMHG | OXYGEN SATURATION: 100 % | RESPIRATION RATE: 16 BRPM

## 2018-09-21 LAB
ANION GAP SERPL CALC-SCNC: 10 MMOL/L — SIGNIFICANT CHANGE UP (ref 5–17)
BUN SERPL-MCNC: 39 MG/DL — HIGH (ref 7–23)
CALCIUM 24H UR-MRATE: 17 MG/24 H — LOW (ref 50–150)
CALCIUM SERPL-MCNC: 9.6 MG/DL — SIGNIFICANT CHANGE UP (ref 8.4–10.5)
CHLORIDE SERPL-SCNC: 106 MMOL/L — SIGNIFICANT CHANGE UP (ref 96–108)
CO2 SERPL-SCNC: 18 MMOL/L — LOW (ref 22–31)
COLLECT DURATION TIME UR: 24 HR — SIGNIFICANT CHANGE UP
CREAT SERPL-MCNC: 3.76 MG/DL — HIGH (ref 0.5–1.3)
GLUCOSE BLDC GLUCOMTR-MCNC: 146 MG/DL — HIGH (ref 70–99)
GLUCOSE SERPL-MCNC: 142 MG/DL — HIGH (ref 70–99)
HCT VFR BLD CALC: 27.1 % — LOW (ref 39–50)
HGB BLD-MCNC: 9 G/DL — LOW (ref 13–17)
MAGNESIUM SERPL-MCNC: 2.1 MG/DL — SIGNIFICANT CHANGE UP (ref 1.6–2.6)
MCHC RBC-ENTMCNC: 33.3 GM/DL — SIGNIFICANT CHANGE UP (ref 32–36)
MCHC RBC-ENTMCNC: 33.4 PG — SIGNIFICANT CHANGE UP (ref 27–34)
MCV RBC AUTO: 100 FL — SIGNIFICANT CHANGE UP (ref 80–100)
OXALATE RANDOM URINE CREATININE: 148 MG/DL — SIGNIFICANT CHANGE UP (ref 20–320)
OXALATE RANDOM URINE: 37 MG/G CREAT — HIGH (ref 3–30)
PHOSPHATE SERPL-MCNC: 3.3 MG/DL — SIGNIFICANT CHANGE UP (ref 2.5–4.5)
PLATELET # BLD AUTO: 167 K/UL — SIGNIFICANT CHANGE UP (ref 150–400)
POTASSIUM SERPL-MCNC: 5.3 MMOL/L — SIGNIFICANT CHANGE UP (ref 3.5–5.3)
POTASSIUM SERPL-SCNC: 5.3 MMOL/L — SIGNIFICANT CHANGE UP (ref 3.5–5.3)
RBC # BLD: 2.71 M/UL — LOW (ref 4.2–5.8)
RBC # FLD: 17.5 % — HIGH (ref 10.3–14.5)
SODIUM SERPL-SCNC: 134 MMOL/L — LOW (ref 135–145)
TACROLIMUS SERPL-MCNC: 10.3 NG/ML — SIGNIFICANT CHANGE UP
TOTAL VOLUME - 24 HOUR: 1725 ML — SIGNIFICANT CHANGE UP
URINE CREATININE CALCULATION: 1.5 G/24 H — SIGNIFICANT CHANGE UP (ref 1–2)
WBC # BLD: 4.6 K/UL — SIGNIFICANT CHANGE UP (ref 3.8–10.5)
WBC # FLD AUTO: 4.6 K/UL — SIGNIFICANT CHANGE UP (ref 3.8–10.5)

## 2018-09-21 PROCEDURE — 85027 COMPLETE CBC AUTOMATED: CPT

## 2018-09-21 PROCEDURE — 80048 BASIC METABOLIC PNL TOTAL CA: CPT

## 2018-09-21 PROCEDURE — C1887: CPT

## 2018-09-21 PROCEDURE — 82955 ASSAY OF G6PD ENZYME: CPT

## 2018-09-21 PROCEDURE — 87070 CULTURE OTHR SPECIMN AEROBIC: CPT

## 2018-09-21 PROCEDURE — 50693 PLMT URETERAL STENT PRQ: CPT

## 2018-09-21 PROCEDURE — 85610 PROTHROMBIN TIME: CPT

## 2018-09-21 PROCEDURE — C2617: CPT

## 2018-09-21 PROCEDURE — 82803 BLOOD GASES ANY COMBINATION: CPT

## 2018-09-21 PROCEDURE — 94640 AIRWAY INHALATION TREATMENT: CPT

## 2018-09-21 PROCEDURE — 83735 ASSAY OF MAGNESIUM: CPT

## 2018-09-21 PROCEDURE — 76942 ECHO GUIDE FOR BIOPSY: CPT | Mod: 59

## 2018-09-21 PROCEDURE — 99232 SBSQ HOSP IP/OBS MODERATE 35: CPT | Mod: GC

## 2018-09-21 PROCEDURE — 10022: CPT

## 2018-09-21 PROCEDURE — 82962 GLUCOSE BLOOD TEST: CPT

## 2018-09-21 PROCEDURE — 87205 SMEAR GRAM STAIN: CPT

## 2018-09-21 PROCEDURE — 50431 NJX PX NFROSGRM &/URTRGRM: CPT

## 2018-09-21 PROCEDURE — C1769: CPT

## 2018-09-21 PROCEDURE — 93005 ELECTROCARDIOGRAM TRACING: CPT

## 2018-09-21 PROCEDURE — 87075 CULTR BACTERIA EXCEPT BLOOD: CPT

## 2018-09-21 PROCEDURE — C1729: CPT

## 2018-09-21 PROCEDURE — 82570 ASSAY OF URINE CREATININE: CPT

## 2018-09-21 PROCEDURE — 82340 ASSAY OF CALCIUM IN URINE: CPT

## 2018-09-21 PROCEDURE — 81001 URINALYSIS AUTO W/SCOPE: CPT

## 2018-09-21 PROCEDURE — 84100 ASSAY OF PHOSPHORUS: CPT

## 2018-09-21 PROCEDURE — 80197 ASSAY OF TACROLIMUS: CPT

## 2018-09-21 PROCEDURE — 50432 PLMT NEPHROSTOMY CATHETER: CPT

## 2018-09-21 PROCEDURE — C1894: CPT

## 2018-09-21 PROCEDURE — 87086 URINE CULTURE/COLONY COUNT: CPT

## 2018-09-21 PROCEDURE — 83945 ASSAY OF OXALATE: CPT

## 2018-09-21 RX ORDER — TACROLIMUS 5 MG/1
7 CAPSULE ORAL
Qty: 0 | Refills: 0 | DISCHARGE
Start: 2018-09-21

## 2018-09-21 RX ORDER — VALGANCICLOVIR 450 MG/1
1 TABLET, FILM COATED ORAL
Qty: 0 | Refills: 0 | DISCHARGE
Start: 2018-09-21

## 2018-09-21 RX ORDER — MYCOPHENOLATE MOFETIL 250 MG/1
1000 CAPSULE ORAL
Qty: 0 | Refills: 0 | DISCHARGE
Start: 2018-09-21

## 2018-09-21 RX ORDER — ATOVAQUONE 750 MG/5ML
10 SUSPENSION ORAL
Qty: 300 | Refills: 0
Start: 2018-09-21 | End: 2018-10-20

## 2018-09-21 RX ORDER — TACROLIMUS 5 MG/1
3.5 CAPSULE ORAL
Qty: 0 | Refills: 0 | Status: DISCONTINUED | OUTPATIENT
Start: 2018-09-21 | End: 2018-09-21

## 2018-09-21 RX ADMIN — Medication 500000 UNIT(S): at 06:04

## 2018-09-21 RX ADMIN — Medication 5 MILLIGRAM(S): at 06:04

## 2018-09-21 RX ADMIN — TACROLIMUS 4 MILLIGRAM(S): 5 CAPSULE ORAL at 06:04

## 2018-09-21 RX ADMIN — FAMOTIDINE 20 MILLIGRAM(S): 10 INJECTION INTRAVENOUS at 11:17

## 2018-09-21 RX ADMIN — MYCOPHENOLATE MOFETIL 1000 MILLIGRAM(S): 250 CAPSULE ORAL at 06:04

## 2018-09-21 RX ADMIN — Medication 500000 UNIT(S): at 11:23

## 2018-09-21 RX ADMIN — VALGANCICLOVIR 450 MILLIGRAM(S): 450 TABLET, FILM COATED ORAL at 11:17

## 2018-09-21 RX ADMIN — Medication 100 MILLIGRAM(S): at 11:17

## 2018-09-21 RX ADMIN — GABAPENTIN 300 MILLIGRAM(S): 400 CAPSULE ORAL at 11:17

## 2018-09-21 NOTE — PROGRESS NOTE ADULT - PROBLEM SELECTOR PROBLEM 4
Essential hypertension
DM (diabetes mellitus)

## 2018-09-21 NOTE — PROGRESS NOTE ADULT - PROBLEM SELECTOR PLAN 3
cont januvia and sliding scale
Continue home dose Januvia 25mg daily    Continue sliding scale coverage.
Fingersticks before meals and at bedtime.  Resume home dose Januvia 25mg daily when brought in by family  Continue sliding scale coverage.
On home dose Januvia 25mg daily    Continue sliding scale coverage.
On home dose Januvia 25mg daily    Continue sliding scale coverage.
cont Januvia and sliding scale  Avoid hypoglycemia.
cont januvia and sliding scale
Continue Nystatin, Bactrim, Tacrolimus, Cellcept and Prednisone  Valcyte on hold due to leukopenia.  Will follow  Daily Tacrolimus levels

## 2018-09-21 NOTE — PROGRESS NOTE ADULT - PROBLEM SELECTOR PLAN 4
Reasonable control.
BP controlled.
Reasonable control.
Fingersticks before meals and at bedtime.  Continue home dose Januvia 25mg daily   Continue sliding scale coverage

## 2018-09-21 NOTE — PROGRESS NOTE ADULT - ATTENDING COMMENTS
No change in Tacrolimus dose
No change in tacrolimus dose.
Stent migrated to bladder.  Will schedule with Dr. Yao for stent removal.    Will need new stent placed with IR for distal ureteral stricture.  Neph tube in place will cap after stent placed    Immuno: tac goal 8-10, cellcept , steroids
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  Immunosuppression - Cont Prograf 4mg bid; Prednisone 5mg daily; Cellcept 500mg bid  Will check tacrolimus level and adjust dose accordingly  hyperkalemia possibly due to prograf and CHELA. lasix 80 mg, cont to monitor  IR for nephrostogram today to eval for possible ureteral stricture vs stent migration vs other cause of obstruction  prophylactic abx before manipulation
CHELA not resolved  24 hour urine collection completed  d/c home today and f/u next week.
Pt feels fine  CHELA but creatinine improving with nephrostomy tube  Going for nephrostogram today  BP and blood sugar ok for now.
Pt feels ok  Creatinine stable  Check 24 hour urine for stone analysis  Plan discharge tomorrow.
Still with CHELA  ureteral stent to be placed and displaced stent to be removed  check serum oxalate and when not going to procedure 24 hour urine for oxalate, calcium, citrate as pts last biopsy had few oxalate stones.

## 2018-09-21 NOTE — PROGRESS NOTE ADULT - PROBLEM SELECTOR PROBLEM 1
Hydronephrosis
Transplant recipient
Hyperkalemia, diminished renal excretion
Transplant recipient

## 2018-09-21 NOTE — PROGRESS NOTE ADULT - PROBLEM SELECTOR PROBLEM 2
Immunosuppression
Transplant recipient

## 2018-09-21 NOTE — PROGRESS NOTE ADULT - PROBLEM SELECTOR PROBLEM 3
DM (diabetes mellitus)
Immunosuppression

## 2018-09-21 NOTE — PROGRESS NOTE ADULT - SUBJECTIVE AND OBJECTIVE BOX
Transplant Surgery - Multidisciplinary Rounds  --------------------------------------------------------------  DDRT 7/17/18    Present:  Patient seen with multidisciplinary team (Surgeon Dr. Castaneda,  Nephrologist, Abhishek Harrell pharmacist, Diana nurse, Ling nurse manager, Roxann Jorgensen NP, renal fellow MD)  in am rounds and examined with Dr. Castaneda.    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 CIT 22 hours.  Simulect induction. His post-operative course was complicated by delayed graft function requiring TIW HD and PO Lasix 80mg BID.   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  Patient's creatinine had been improving as an outpatient but recently found to have a creatine of 5. US revealed some concern for TRAS, perinephric collection and hydronephrosis. Patient was sent to IR for retrograde nephrostogram and placement of nephrostomy tube with drainage of perinephric collection.  On arrival to IR patient was found to be hyperkalemic to 6.7.  Patient was admitted for medical management of hyperkalemia with procedure delayed.    No acute events overnight. S/p OR9/19 for cystoscopy and removal of migrated stent.  IR for placement of 7Fr x 12cm stent and exchange of nephrostomy tube.  Tolerated well.  Nephrostomy capped, voiding. Cellcept increased to 1g BID and Valcyte restarted.     Potential Discharge date  9/21    Education: urine collection    Plan of care: see below        PAST HISTORY  --------------------------------------------------------------------------------  No significant changes to PMH, PSH, FHx, SHx, unless otherwise noted    Allergies    No Known Allergies    Intolerances      MEDICATIONS  (STANDING):  allopurinol 100 milliGRAM(s) Oral daily  atovaquone Suspension 1500 milliGRAM(s) Oral daily  dextrose 5%. 1000 milliLiter(s) (50 mL/Hr) IV Continuous <Continuous>  dextrose 50% Injectable 12.5 Gram(s) IV Push once  dextrose 50% Injectable 25 Gram(s) IV Push once  dextrose 50% Injectable 25 Gram(s) IV Push once  Epclusa (Sofosbuvir 400 and Velpatasvir) 1 Tablet(s) 1 Tablet(s) Oral daily  famotidine    Tablet 20 milliGRAM(s) Oral daily  gabapentin 300 milliGRAM(s) Oral daily  influenza   Vaccine 0.5 milliLiter(s) IntraMuscular once  insulin lispro (HumaLOG) corrective regimen sliding scale   SubCutaneous three times a day before meals  insulin lispro (HumaLOG) corrective regimen sliding scale   SubCutaneous at bedtime  mycophenolate mofetil 1000 milliGRAM(s) Oral two times a day  nystatin    Suspension 883942 Unit(s) Oral four times a day  predniSONE   Tablet 5 milliGRAM(s) Oral daily  sitaGLIPtin 25 milliGRAM(s) Oral daily  tacrolimus 3.5 milliGRAM(s) Oral two times a day  tamsulosin 0.4 milliGRAM(s) Oral at bedtime  valGANciclovir 450 milliGRAM(s) Oral <User Schedule>    MEDICATIONS  (PRN):  dextrose 40% Gel 15 Gram(s) Oral once PRN Blood Glucose LESS THAN 70 milliGRAM(s)/deciliter  glucagon  Injectable 1 milliGRAM(s) IntraMuscular once PRN Glucose LESS THAN 70 milligrams/deciliter  HYDROmorphone  Injectable 0.5 milliGRAM(s) IV Push every 10 minutes PRN Moderate Pain (4 - 6)  ondansetron Injectable 4 milliGRAM(s) IV Push once PRN Nausea and/or Vomiting    VITAL SIGNS/ PHYSICAL EXAM:  Vital Signs Last 24 Hrs  T(C): 36.9 (21 Sep 2018 05:56), Max: 37 (20 Sep 2018 11:00)  T(F): 98.4 (21 Sep 2018 05:56), Max: 98.6 (20 Sep 2018 11:00)  HR: 80 (21 Sep 2018 05:56) (70 - 89)  BP: 137/76 (21 Sep 2018 05:56) (121/67 - 147/71)  BP(mean): --  RR: 18 (21 Sep 2018 05:56) (18 - 18)  SpO2: 98% (21 Sep 2018 05:56) (98% - 100%)  I&O's Summary    20 Sep 2018 07:01  -  21 Sep 2018 07:00  --------------------------------------------------------  IN: 1180 mL / OUT: 1805 mL / NET: -625 mL                        9.0    4.6   )-----------( 167      ( 21 Sep 2018 06:07 )             27.1     09-21    134<L>  |  106  |  39<H>  ----------------------------<  142<H>  5.3   |  18<L>  |  3.76<H>    Ca    9.6      21 Sep 2018 06:05  Phos  3.3     09-21  Mg     2.1     09-21      Tacrolimus (), Serum: 11.0 ng/mL (09-20 @ 07:45)    Review of systems  Gen: No weight changes, fatigue, fevers/chills, weakness  Skin: No rashes  Head/Eyes/Ears/Mouth: No headache; Normal hearing; Normal vision w/o blurriness; No sinus pain/discomfort, sore throat  Respiratory: No dyspnea, cough, wheezing, hemoptysis  CV: No chest pain, PND, orthopnea  GI: No abdominal pain, diarrhea, constipation, nausea, vomiting, melena, hematochezia  : No increased frequency, dysuria, hematuria, nocturia  MSK: No joint pain/swelling; no back pain; no edema  Neuro: No dizziness/lightheadedness, weakness, seizures, numbness, tingling  Heme: No easy bruising or bleeding  Endo: No heat/cold intolerance  Psych: No significant nervousness, anxiety, stress, depression  All other systems were reviewed and are negative, except as noted.    PHYSICAL EXAM:  Constitutional: Well developed / well nourished  Eyes: Anicteric, PERRLA  ENMT: nc/at  Neck: supple  Respiratory: CTA B/L  Cardiovascular: RRR  Gastrointestinal: Soft abdomen, NT, ND  Genitourinary: Voiding. Nephrostomy drain capped  Extremities: SCD's in place and working bilaterally  Vascular: Palpable dp pulses bilaterally  Neurological: A&O x3  Skin: Wound well healed   Musculoskeletal: Moving all extremities  Psychiatric: Responsive Transplant Surgery - Multidisciplinary Rounds  --------------------------------------------------------------  DDRT 7/17/18    Present:  Patient seen with multidisciplinary team (Surgeon Dr. Castaneda, Dr. Yao, Dr. Heath, Nephrologist, Dr. Bhakta, nurse,  Ilir Greene NP, renal fellow MD)  in am rounds and examined with Dr. Heath.  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 CIT 22 hours.  Simulect induction. His post-operative course was complicated by delayed graft function requiring TIW HD and PO Lasix 80mg BID.   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  Patient's creatinine had been improving as an outpatient but recently found to have a creatine of 5. US revealed some concern for TRAS, perinephric collection and hydronephrosis. Patient was sent to IR for retrograde nephrostogram and placement of nephrostomy tube with drainage of perinephric collection.  On arrival to IR patient was found to be hyperkalemic to 6.7.  Patient was admitted for medical management of hyperkalemia with procedure delayed.    No acute events overnight. S/p OR9/19 for cystoscopy and removal of migrated stent.  IR for placement of 7Fr x 12cm stent and exchange of nephrostomy tube.  Tolerated well.  Nephrostomy capped, voiding. Cellcept increased to 1g BID and Valcyte restarted.     Potential Discharge date  9/21    Education: urine collection    Plan of care: see below        PAST HISTORY  --------------------------------------------------------------------------------  No significant changes to PMH, PSH, FHx, SHx, unless otherwise noted    Allergies    No Known Allergies    Intolerances      MEDICATIONS  (STANDING):  allopurinol 100 milliGRAM(s) Oral daily  atovaquone Suspension 1500 milliGRAM(s) Oral daily  dextrose 5%. 1000 milliLiter(s) (50 mL/Hr) IV Continuous <Continuous>  dextrose 50% Injectable 12.5 Gram(s) IV Push once  dextrose 50% Injectable 25 Gram(s) IV Push once  dextrose 50% Injectable 25 Gram(s) IV Push once  Epclusa (Sofosbuvir 400 and Velpatasvir) 1 Tablet(s) 1 Tablet(s) Oral daily  famotidine    Tablet 20 milliGRAM(s) Oral daily  gabapentin 300 milliGRAM(s) Oral daily  influenza   Vaccine 0.5 milliLiter(s) IntraMuscular once  insulin lispro (HumaLOG) corrective regimen sliding scale   SubCutaneous three times a day before meals  insulin lispro (HumaLOG) corrective regimen sliding scale   SubCutaneous at bedtime  mycophenolate mofetil 1000 milliGRAM(s) Oral two times a day  nystatin    Suspension 410335 Unit(s) Oral four times a day  predniSONE   Tablet 5 milliGRAM(s) Oral daily  sitaGLIPtin 25 milliGRAM(s) Oral daily  tacrolimus 3.5 milliGRAM(s) Oral two times a day  tamsulosin 0.4 milliGRAM(s) Oral at bedtime  valGANciclovir 450 milliGRAM(s) Oral <User Schedule>    MEDICATIONS  (PRN):  dextrose 40% Gel 15 Gram(s) Oral once PRN Blood Glucose LESS THAN 70 milliGRAM(s)/deciliter  glucagon  Injectable 1 milliGRAM(s) IntraMuscular once PRN Glucose LESS THAN 70 milligrams/deciliter  HYDROmorphone  Injectable 0.5 milliGRAM(s) IV Push every 10 minutes PRN Moderate Pain (4 - 6)  ondansetron Injectable 4 milliGRAM(s) IV Push once PRN Nausea and/or Vomiting    VITAL SIGNS/ PHYSICAL EXAM:  Vital Signs Last 24 Hrs  T(C): 36.9 (21 Sep 2018 05:56), Max: 37 (20 Sep 2018 11:00)  T(F): 98.4 (21 Sep 2018 05:56), Max: 98.6 (20 Sep 2018 11:00)  HR: 80 (21 Sep 2018 05:56) (70 - 89)  BP: 137/76 (21 Sep 2018 05:56) (121/67 - 147/71)  BP(mean): --  RR: 18 (21 Sep 2018 05:56) (18 - 18)  SpO2: 98% (21 Sep 2018 05:56) (98% - 100%)  I&O's Summary    20 Sep 2018 07:01  -  21 Sep 2018 07:00  --------------------------------------------------------  IN: 1180 mL / OUT: 1805 mL / NET: -625 mL                        9.0    4.6   )-----------( 167      ( 21 Sep 2018 06:07 )             27.1     09-21    134<L>  |  106  |  39<H>  ----------------------------<  142<H>  5.3   |  18<L>  |  3.76<H>    Ca    9.6      21 Sep 2018 06:05  Phos  3.3     09-21  Mg     2.1     09-21      Tacrolimus (), Serum: 11.0 ng/mL (09-20 @ 07:45)    Review of systems  Gen: No weight changes, fatigue, fevers/chills, weakness  Skin: No rashes  Head/Eyes/Ears/Mouth: No headache; Normal hearing; Normal vision w/o blurriness; No sinus pain/discomfort, sore throat  Respiratory: No dyspnea, cough, wheezing, hemoptysis  CV: No chest pain, PND, orthopnea  GI: No abdominal pain, diarrhea, constipation, nausea, vomiting, melena, hematochezia  : No increased frequency, dysuria, hematuria, nocturia  MSK: No joint pain/swelling; no back pain; no edema  Neuro: No dizziness/lightheadedness, weakness, seizures, numbness, tingling  Heme: No easy bruising or bleeding  Endo: No heat/cold intolerance  Psych: No significant nervousness, anxiety, stress, depression  All other systems were reviewed and are negative, except as noted.    PHYSICAL EXAM:  Constitutional: Well developed / well nourished  Eyes: Anicteric, PERRLA  ENMT: nc/at  Neck: supple  Respiratory: CTA B/L  Cardiovascular: RRR  Gastrointestinal: Soft abdomen, NT, ND  Genitourinary: Voiding. Nephrostomy drain capped  Extremities: SCD's in place and working bilaterally  Vascular: Palpable dp pulses bilaterally  Neurological: A&O x3  Skin: Wound well healed   Musculoskeletal: Moving all extremities  Psychiatric: Responsive

## 2018-09-21 NOTE — PROGRESS NOTE ADULT - PROBLEM SELECTOR PLAN 1
Poor function possibly due to hydro.  Cont nephrostomy tube and recheck labs tomorrow.  Will need nephrostogram Monday.
Elevated creatinine and hydronephrosis noted on Renal Usg 9/14 s/p 8Fr nephrostomy in IR  9/17 Nephrostogram revealed distal ureteral stricture and stent coiled in bladder. Plan for OR today for migrated stent removal and then IR for nephrostogram and placement of double J stent.   NPO for procedure  9/15 Abd fluid Cx: ngtd   UaCx: ngtd.
Elevated creatinine and hydronephrosis noted on US 9/14 s/p 8Fr Nephrostomy tube placement with good output.  Start cipro 500mg BID PO PPX x2 days  Will  obtain nephrostogram today by IR  9/15 Abd fluid Cx: ngtd   UaCx: ngtd
Elevated creatinine and hydronephrosis noted on US 9/14 s/p 8Fr Nephrostomy tube placement with good output. Nephrostogram from yesterday revealed that distal ureter is narrowed but not obstructed. Urinary stent is coiled within the bladder. , plan for restent and removal of dislodged stent in am.  On cipro 500mg BID PO PPX x2 days  9/15 Abd fluid Cx: ngtd   UaCx: ngtd.
Elevated creatinine and hydronephrosis noted on usg. 9/14 s/p 8Fr Nephrostomy tube placement  Will likely obtain nephrostogram tomorrow  9/15 Abd fluid Cx: ngtd   UaCx: ngtd  Strict I/Os  Daily BMP. Monitor K+  Continue Allopurinol for uric acid crystals, Flomax, Pepcid  Continue Epclusa for HCV treatment.
Nephrostomy tube capped, Tolerating well. Creatinine stable  9/15 Abd fluid Cx: ngtd   UaCx: ngtd.
Nephrostomy tube capped, Tolerating well. Creatinine stable  Continue Zosyn for now.  9/15 Abd fluid Cx: ngtd   UaCx: ngtd.
Poor function possibly due to hydro.  Cont nephrostomy tube and recheck labs tomorrow.  Will need nephrostogram tomorrow.  Slight improvement in renal function.
Poor function possibly due to hydronephrosis.   Nephrostogram done, showed  Distal ureter is narrowed but not obstructed and dislodged stent in the bladder.   S/P re-stenting and retrieval of previous stent   Monitor I&O.  Scr not as expected, previous biopsy showed oxalate crystals, will obtain 24 hour urine collection, obtain ca, oxalate, creat, while pt is in the hospital.
Poor function possibly due to hydronephrosis.   Nephrostogram done, showed  Distal ureter narrowing but not obstructed and dislodged stent in the bladder.   S/P re-stenting and retrieval of previous stent   Good urine output, nephrostomy capped, voiding w/o difficulties. For discharge today and f/u in clinic next week.
Poor function possibly due to hydronephrosis.   Nephrostogram done, showing  Distal ureter is narrowed but not obstructed. Urinary stent is coiled within the bladder.  Will need stent placement by IR today after stent removal in the OR. Then will plan to remove nephrostomy if obstruction is resolved.   Monitor I&O.
Poor function possibly due to hydronephrosis.   Nephrostogram done, showing  Distal ureter is narrowed but not obstructed. Urinary stent is coiled within the bladder.  Will need stent placement by IR today and surgery will plan for cystoscopy and previous stent removal.
Poor function possibly due to hydronephrosis.  Cont nephrostomy tube     Will need nephrostogram today.   Scr improving, 3.3.
Potassium improved following Insulin/dextrose/lasix.  5.2 this am  repeat BMP at 5pm today

## 2018-09-21 NOTE — PROGRESS NOTE ADULT - SUBJECTIVE AND OBJECTIVE BOX
[facility] DIVISION OF KIDNEY DISEASES AND HYPERTENSION -- FOLLOW UP NOTE  --------------------------------------------------------------------------------  Chief Complaint:  DDRT 7/17/18  CHELA on CKD.     24 hour events/subjective:    Pt examined at bed side, not in distress. No overnight events.     PAST HISTORY  --------------------------------------------------------------------------------  No significant changes to PMH, PSH, FHx, SHx, unless otherwise noted    ALLERGIES & MEDICATIONS  --------------------------------------------------------------------------------  Allergies    No Known Allergies    Intolerances      Standing Inpatient Medications  allopurinol 100 milliGRAM(s) Oral daily  atovaquone Suspension 1500 milliGRAM(s) Oral daily  dextrose 5%. 1000 milliLiter(s) IV Continuous <Continuous>  dextrose 50% Injectable 12.5 Gram(s) IV Push once  dextrose 50% Injectable 25 Gram(s) IV Push once  dextrose 50% Injectable 25 Gram(s) IV Push once  Epclusa (Sofosbuvir 400 and Velpatasvir) 1 Tablet(s) 1 Tablet(s) Oral daily  famotidine    Tablet 20 milliGRAM(s) Oral daily  gabapentin 300 milliGRAM(s) Oral daily  influenza   Vaccine 0.5 milliLiter(s) IntraMuscular once  insulin lispro (HumaLOG) corrective regimen sliding scale   SubCutaneous three times a day before meals  insulin lispro (HumaLOG) corrective regimen sliding scale   SubCutaneous at bedtime  mycophenolate mofetil 1000 milliGRAM(s) Oral two times a day  nystatin    Suspension 547166 Unit(s) Oral four times a day  predniSONE   Tablet 5 milliGRAM(s) Oral daily  sitaGLIPtin 25 milliGRAM(s) Oral daily  tacrolimus 3.5 milliGRAM(s) Oral two times a day  tamsulosin 0.4 milliGRAM(s) Oral at bedtime  valGANciclovir 450 milliGRAM(s) Oral <User Schedule>    PRN Inpatient Medications  dextrose 40% Gel 15 Gram(s) Oral once PRN  glucagon  Injectable 1 milliGRAM(s) IntraMuscular once PRN  HYDROmorphone  Injectable 0.5 milliGRAM(s) IV Push every 10 minutes PRN  ondansetron Injectable 4 milliGRAM(s) IV Push once PRN      REVIEW OF SYSTEMS  --------------------------------------------------------------------------------  Gen: No weight changes, fatigue, fevers/chills, weakness  Skin: No rashes  Head/Eyes/Ears/Mouth: No headache; Normal hearing; Normal vision w/o blurriness; No sinus pain/discomfort, sore throat  Respiratory: No dyspnea, cough, wheezing, hemoptysis  CV: No chest pain, PND, orthopnea  GI: No abdominal pain, diarrhea, constipation, nausea, vomiting, melena, hematochezia  : No increased frequency, dysuria, hematuria, nocturia  MSK: No joint pain/swelling; no back pain; no edema  Neuro: No dizziness/lightheadedness, weakness, seizures, numbness, tingling  Heme: No easy bruising or bleeding  Endo: No heat/cold intolerance  Psych: No significant nervousness, anxiety, stress, depression    VITALS/PHYSICAL EXAM  --------------------------------------------------------------------------------  T(C): 36.9 (09-21-18 @ 05:56), Max: 37 (09-20-18 @ 11:00)  HR: 80 (09-21-18 @ 05:56) (70 - 89)  BP: 137/76 (09-21-18 @ 05:56) (121/67 - 147/71)  RR: 18 (09-21-18 @ 05:56) (18 - 18)  SpO2: 98% (09-21-18 @ 05:56) (98% - 100%)  Wt(kg): --        09-20-18 @ 07:01  -  09-21-18 @ 07:00  --------------------------------------------------------  IN: 1180 mL / OUT: 1805 mL / NET: -625 mL      Physical Exam:  	Gen: NAD, well-appearing  	HEENT: PERRL, supple neck, clear oropharynx  	Pulm: CTA B/L  	CV: RRR, S1S2; no rub  	Back: No spinal or CVA tenderness; no sacral edema  	Abd: +BS, soft, nontender/nondistended. Multiple scars (ileostomy). Nephrostomy capped.               Transplant: No tenderness, swelling, nephrostomy in place.   	: No suprapubic tenderness. No armenta.   	UE: Warm, FROM, intact strength; no edema; no asterixis  	LE: Warm, FROM, intact strength; no edema  	Skin: Warm, without rashes    LABS/STUDIES  --------------------------------------------------------------------------------              9.0    4.6   >-----------<  167      [09-21-18 @ 06:07]              27.1     134  |  106  |  39  ----------------------------<  142      [09-21-18 @ 06:05]  5.3   |  18  |  3.76        Ca     9.6     [09-21-18 @ 06:05]      Mg     2.1     [09-21-18 @ 06:05]      Phos  3.3     [09-21-18 @ 06:05]      Creatinine Trend:  SCr 3.76 [09-21 @ 06:05]  SCr 3.87 [09-20 @ 06:07]  SCr 3.79 [09-19 @ 06:00]  SCr 4.21 [09-18 @ 05:54]  SCr 3.67 [09-18 @ 02:18]

## 2018-09-21 NOTE — PROGRESS NOTE ADULT - ASSESSMENT
1.	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 CIT 22 hours with hydro on sono now s/p nephrostomy tube, underwent ureteral stenting 9/19/18
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 CIT 22 hours with hydro on sono now s/p nephrostomy tube, underwent ureteral stenting 9/19/18
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 CIT 22 hours with hydro on sono now s/p nephrostomy tube.
67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 with delayed graft function, 8/13 biopsy revealing 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 found to have elevated Creatinine 5 and TRAS, a perinephric collection and hydronephrosis on renal ultrasound admitted for medical management of hyperkalemia now resolved s/p retrograde nephrostogram, placement of nephrostomy tube and drainage of perinephric collection S/P Nephrostogram which revealed distal ureteral stricture and stent coiled in bladder for OR today for migrated stent removal and placement double J stent in IR.
67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 with delayed graft function, 8/13 biopsy revealing 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 found to have elevated Creatinine 5 and TRAS, a perinephric collection and hydronephrosis on renal ultrasound admitted for medical management of hyperkalemia now resolved s/p retrograde nephrostogram, placement of nephrostomy tube and drainage of perinephric collection now S/P migrated stent removal and placement of double J stent and exchange of nephrostomy tube.
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 Plan for discharge home today.
67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 with delayed graft function, 8/13 biopsy revealing 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 found to have elevated Creatinine 5 and TRAS, a perinephric collection and hydronephrosis on renal ultrasound admitted for medical management of hyperkalemia now resolved s/p retrograde nephrostogram, placement of nephrostomy tube and drainage of perinephric collection.
67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 with delayed graft function, 8/13 biopsy revealing 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 found to have elevated Creatinine 5 and TRAS, a perinephric collection and hydronephrosis on renal ultrasound admitted for medical management of hyperkalemia now resolved s/p retrograde nephrostogram, placement of nephrostomy tube and drainage of perinephric collection. Discharge planning in [facility].
67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 with delayed graft function, 8/13 biopsy revealing 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 found to have elevated Creatinine 5 and TRAS, a perinephric collection and hydronephrosis on renal ultrasound admitted for medical management of hyperkalemia now resolved s/p retrograde nephrostogram, placement of nephrostomy tube and drainage of perinephric collection. Nephrostogram from yesterday revealed that distal ureter is narrowed but not obstructed.  Urinary stent is coiled within the bladder. Discharge planning in progress.
67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 with delayed graft function and 8/13 biopsy revealing 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 found to have elevated Creatinine 5 and TRAS, a perinephric collection and hydronephrosis on renal ultrasound admitted for medical management of hyperkalemia now resolved s/p retrograde nephrostogram and placement of nephrostomy tube with drainage of perinephric collection.
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 CIT 22 hours with hydro on sono now s/p nephrostomy tube.

## 2018-09-21 NOTE — PROGRESS NOTE ADULT - REASON FOR ADMISSION
Hyperkalemia

## 2018-09-21 NOTE — PROGRESS NOTE ADULT - PROBLEM SELECTOR PLAN 2
Cont tacro for level 8-10, pred 5. Increase MMF to 1 g BID. Will decrease tacro to 3.5 mg BID.   C/W Valcyte,  atovaquone and nystatin for prophylaxis.

## 2018-09-21 NOTE — PROGRESS NOTE ADULT - PROVIDER SPECIALTY LIST ADULT
Intervent Radiology
Nephrology
Transplant Medicine
Transplant Surgery
Intervent Radiology
Nephrology

## 2018-09-27 ENCOUNTER — APPOINTMENT (OUTPATIENT)
Dept: NEPHROLOGY | Facility: CLINIC | Age: 68
End: 2018-09-27
Payer: MEDICARE

## 2018-09-27 ENCOUNTER — LABORATORY RESULT (OUTPATIENT)
Age: 68
End: 2018-09-27

## 2018-09-27 VITALS
WEIGHT: 217 LBS | OXYGEN SATURATION: 98 % | DIASTOLIC BLOOD PRESSURE: 65 MMHG | TEMPERATURE: 98.8 F | HEART RATE: 114 BPM | SYSTOLIC BLOOD PRESSURE: 118 MMHG | BODY MASS INDEX: 32.89 KG/M2 | HEIGHT: 68 IN | RESPIRATION RATE: 12 BRPM

## 2018-09-27 PROCEDURE — 99215 OFFICE O/P EST HI 40 MIN: CPT

## 2018-09-27 RX ORDER — SODIUM POLYSTYRENE SULFONATE 15 G/60ML
15 SUSPENSION ORAL; RECTAL DAILY
Qty: 120 | Refills: 0 | Status: DISCONTINUED | COMMUNITY
Start: 2018-08-25 | End: 2018-09-27

## 2018-09-28 LAB — BKV DNA SPEC QL NAA+PROBE: NORMAL

## 2018-10-01 ENCOUNTER — APPOINTMENT (OUTPATIENT)
Dept: TRANSPLANT | Facility: CLINIC | Age: 68
End: 2018-10-01

## 2018-10-01 DIAGNOSIS — E87.5 HYPERKALEMIA: ICD-10-CM

## 2018-10-01 LAB
ALBUMIN SERPL ELPH-MCNC: 4 G/DL
ALP BLD-CCNC: 121 U/L
ALT SERPL-CCNC: 7 U/L
ANION GAP SERPL CALC-SCNC: 15 MMOL/L
APPEARANCE: ABNORMAL
AST SERPL-CCNC: 16 U/L
BASOPHILS # BLD AUTO: 0.04 K/UL
BASOPHILS NFR BLD AUTO: 0.8 %
BILIRUB SERPL-MCNC: 0.2 MG/DL
BILIRUBIN URINE: NEGATIVE
BLOOD URINE: ABNORMAL
BUN SERPL-MCNC: 35 MG/DL
CALCIUM SERPL-MCNC: 10 MG/DL
CHLORIDE SERPL-SCNC: 109 MMOL/L
CO2 SERPL-SCNC: 13 MMOL/L
COLOR: YELLOW
CREAT SERPL-MCNC: 3.82 MG/DL
CREAT SPEC-SCNC: 118 MG/DL
CREAT/PROT UR: 1.4 RATIO
EOSINOPHIL # BLD AUTO: 0.02 K/UL
EOSINOPHIL NFR BLD AUTO: 0.4 %
GLUCOSE QUALITATIVE U: NEGATIVE MG/DL
GLUCOSE SERPL-MCNC: 147 MG/DL
HCT VFR BLD CALC: 28.1 %
HGB BLD-MCNC: 9 G/DL
IMM GRANULOCYTES NFR BLD AUTO: 1.6 %
KETONES URINE: NEGATIVE
LDH SERPL-CCNC: 244 U/L
LEUKOCYTE ESTERASE URINE: ABNORMAL
LYMPHOCYTES # BLD AUTO: 0.64 K/UL
LYMPHOCYTES NFR BLD AUTO: 12.6 %
MAGNESIUM SERPL-MCNC: 1.6 MG/DL
MAN DIFF?: NORMAL
MCHC RBC-ENTMCNC: 32 GM/DL
MCHC RBC-ENTMCNC: 32.6 PG
MCV RBC AUTO: 101.8 FL
MONOCYTES # BLD AUTO: 0.51 K/UL
MONOCYTES NFR BLD AUTO: 10 %
NEUTROPHILS # BLD AUTO: 3.79 K/UL
NEUTROPHILS NFR BLD AUTO: 74.6 %
NITRITE URINE: NEGATIVE
PH URINE: 5.5
PHOSPHATE SERPL-MCNC: 3.4 MG/DL
PLATELET # BLD AUTO: 200 K/UL
POTASSIUM SERPL-SCNC: 6 MMOL/L
PROT SERPL-MCNC: 6.8 G/DL
PROT UR-MCNC: 163 MG/DL
PROTEIN URINE: 100 MG/DL
RBC # BLD: 2.76 M/UL
RBC # FLD: 18.9 %
SODIUM SERPL-SCNC: 137 MMOL/L
SPECIFIC GRAVITY URINE: 1.01
TACROLIMUS SERPL-MCNC: 7.4 NG/ML
URATE SERPL-MCNC: 8.8 MG/DL
UROBILINOGEN URINE: NEGATIVE MG/DL
WBC # FLD AUTO: 5.08 K/UL

## 2018-10-02 PROBLEM — E87.5 HYPERKALEMIA: Status: ACTIVE | Noted: 2018-10-02

## 2018-10-02 LAB
ALBUMIN SERPL ELPH-MCNC: 4.2 G/DL
ALP BLD-CCNC: 121 U/L
ALT SERPL-CCNC: 5 U/L
ANION GAP SERPL CALC-SCNC: 13 MMOL/L
APPEARANCE: ABNORMAL
AST SERPL-CCNC: 16 U/L
BACTERIA: NEGATIVE
BASOPHILS # BLD AUTO: 0.07 K/UL
BASOPHILS NFR BLD AUTO: 1.2 %
BILIRUB SERPL-MCNC: 0.2 MG/DL
BILIRUBIN URINE: NEGATIVE
BKV DNA SPEC QL NAA+PROBE: NORMAL
BLOOD URINE: ABNORMAL
BUN SERPL-MCNC: 40 MG/DL
CALCIUM SERPL-MCNC: 10 MG/DL
CHLORIDE SERPL-SCNC: 108 MMOL/L
CO2 SERPL-SCNC: 16 MMOL/L
COLOR: YELLOW
CREAT SERPL-MCNC: 3.71 MG/DL
CREAT SPEC-SCNC: 158 MG/DL
CREAT/PROT UR: 1.1 RATIO
EOSINOPHIL # BLD AUTO: 0.04 K/UL
EOSINOPHIL NFR BLD AUTO: 0.7 %
GLUCOSE QUALITATIVE U: NEGATIVE MG/DL
GLUCOSE SERPL-MCNC: 169 MG/DL
GRANULAR CASTS: 1 /LPF
HBV SURFACE AG SER QL: NONREACTIVE
HCT VFR BLD CALC: 28.6 %
HCV AB SER QL: NONREACTIVE
HCV S/CO RATIO: 0.06 S/CO
HGB BLD-MCNC: 9.2 G/DL
HIV1 RNA # SERPL NAA+PROBE: NORMAL
HIV1 RNA # SERPL NAA+PROBE: NORMAL COPIES/ML
HIV1+2 AB SPEC QL IA.RAPID: NONREACTIVE
HYALINE CASTS: 2 /LPF
IMM GRANULOCYTES NFR BLD AUTO: 1.2 %
KETONES URINE: NEGATIVE
LDH SERPL-CCNC: 232 U/L
LEUKOCYTE ESTERASE URINE: ABNORMAL
LYMPHOCYTES # BLD AUTO: 0.66 K/UL
LYMPHOCYTES NFR BLD AUTO: 11.5 %
MAGNESIUM SERPL-MCNC: 1.6 MG/DL
MAN DIFF?: NORMAL
MCHC RBC-ENTMCNC: 32.2 GM/DL
MCHC RBC-ENTMCNC: 33 PG
MCV RBC AUTO: 102.5 FL
MICROSCOPIC-UA: NORMAL
MONOCYTES # BLD AUTO: 0.7 K/UL
MONOCYTES NFR BLD AUTO: 12.2 %
NEUTROPHILS # BLD AUTO: 4.19 K/UL
NEUTROPHILS NFR BLD AUTO: 73.2 %
NITRITE URINE: NEGATIVE
PH URINE: 5.5
PHOSPHATE SERPL-MCNC: 2.7 MG/DL
PLATELET # BLD AUTO: 198 K/UL
POTASSIUM SERPL-SCNC: 5.9 MMOL/L
PROT SERPL-MCNC: 7.1 G/DL
PROT UR-MCNC: 173 MG/DL
PROTEIN URINE: 300 MG/DL
RBC # BLD: 2.79 M/UL
RBC # FLD: 18.4 %
RED BLOOD CELLS URINE: 172 /HPF
SODIUM SERPL-SCNC: 137 MMOL/L
SPECIFIC GRAVITY URINE: 1.02
SQUAMOUS EPITHELIAL CELLS: 7 /HPF
TACROLIMUS SERPL-MCNC: 6.7 NG/ML
URATE SERPL-MCNC: 8.2 MG/DL
UROBILINOGEN URINE: NEGATIVE MG/DL
VIRAL LOAD INTERP: NORMAL
VIRAL LOAD LOG: NORMAL LG COP/ML
WBC # FLD AUTO: 5.73 K/UL
WHITE BLOOD CELLS URINE: 30 /HPF

## 2018-10-03 LAB
BKV DNA SPEC QL NAA+PROBE: NORMAL
HBV DNA # SERPL NAA+PROBE: NOT DETECTED IU/ML
HCV RNA SERPL NAA DL=5-ACNC: NOT DETECTED IU/ML
HCV RNA SERPL NAA+PROBE-LOG IU: NOT DETECTED LOGIU/ML
HEPB DNA PCR LOG: NOT DETECTED LOGIU/ML

## 2018-10-04 ENCOUNTER — APPOINTMENT (OUTPATIENT)
Dept: TRANSPLANT | Facility: CLINIC | Age: 68
End: 2018-10-04
Payer: MEDICARE

## 2018-10-04 VITALS
RESPIRATION RATE: 12 BRPM | OXYGEN SATURATION: 99 % | SYSTOLIC BLOOD PRESSURE: 123 MMHG | BODY MASS INDEX: 31.98 KG/M2 | WEIGHT: 211 LBS | TEMPERATURE: 98.4 F | HEIGHT: 68 IN | DIASTOLIC BLOOD PRESSURE: 69 MMHG | HEART RATE: 104 BPM

## 2018-10-04 PROCEDURE — 99213 OFFICE O/P EST LOW 20 MIN: CPT | Mod: 24

## 2018-10-04 PROCEDURE — XXXXX: CPT

## 2018-10-05 LAB
ALBUMIN SERPL ELPH-MCNC: 4 G/DL
ALP BLD-CCNC: 118 U/L
ALT SERPL-CCNC: 11 U/L
ANION GAP SERPL CALC-SCNC: 10 MMOL/L
APPEARANCE: ABNORMAL
AST SERPL-CCNC: 16 U/L
BACTERIA: NEGATIVE
BASOPHILS # BLD AUTO: 0.03 K/UL
BASOPHILS NFR BLD AUTO: 0.6 %
BILIRUB SERPL-MCNC: 0.2 MG/DL
BILIRUBIN URINE: NEGATIVE
BLOOD URINE: ABNORMAL
BUN SERPL-MCNC: 39 MG/DL
CALCIUM SERPL-MCNC: 9.8 MG/DL
CHLORIDE SERPL-SCNC: 110 MMOL/L
CO2 SERPL-SCNC: 20 MMOL/L
COLOR: YELLOW
CREAT SERPL-MCNC: 2.83 MG/DL
CREAT SPEC-SCNC: 123 MG/DL
CREAT/PROT UR: 0.6 RATIO
EOSINOPHIL # BLD AUTO: 0.03 K/UL
EOSINOPHIL NFR BLD AUTO: 0.6 %
GLUCOSE QUALITATIVE U: NEGATIVE MG/DL
GLUCOSE SERPL-MCNC: 176 MG/DL
HCT VFR BLD CALC: 28.1 %
HGB BLD-MCNC: 8.6 G/DL
HYALINE CASTS: 4 /LPF
IMM GRANULOCYTES NFR BLD AUTO: 1.2 %
KETONES URINE: NEGATIVE
LDH SERPL-CCNC: 223 U/L
LEUKOCYTE ESTERASE URINE: ABNORMAL
LYMPHOCYTES # BLD AUTO: 0.57 K/UL
LYMPHOCYTES NFR BLD AUTO: 11.1 %
MAGNESIUM SERPL-MCNC: 1.5 MG/DL
MAN DIFF?: NORMAL
MCHC RBC-ENTMCNC: 30.6 GM/DL
MCHC RBC-ENTMCNC: 31.6 PG
MCV RBC AUTO: 103.3 FL
MICROSCOPIC-UA: NORMAL
MONOCYTES # BLD AUTO: 0.43 K/UL
MONOCYTES NFR BLD AUTO: 8.4 %
NEUTROPHILS # BLD AUTO: 4.02 K/UL
NEUTROPHILS NFR BLD AUTO: 78.1 %
NITRITE URINE: NEGATIVE
PH URINE: 5.5
PHOSPHATE SERPL-MCNC: 2.8 MG/DL
PLATELET # BLD AUTO: 189 K/UL
POTASSIUM SERPL-SCNC: 5.2 MMOL/L
PROT SERPL-MCNC: 6.5 G/DL
PROT UR-MCNC: 78 MG/DL
PROTEIN URINE: 100 MG/DL
RBC # BLD: 2.72 M/UL
RBC # FLD: 18.5 %
RED BLOOD CELLS URINE: 126 /HPF
SODIUM SERPL-SCNC: 139 MMOL/L
SPECIFIC GRAVITY URINE: 1.02
SQUAMOUS EPITHELIAL CELLS: 2 /HPF
TACROLIMUS SERPL-MCNC: 7.2 NG/ML
URATE SERPL-MCNC: 7.9 MG/DL
UROBILINOGEN URINE: NEGATIVE MG/DL
WBC # FLD AUTO: 5.14 K/UL
WHITE BLOOD CELLS URINE: 17 /HPF

## 2018-10-07 LAB
CMV DNA SPEC QL NAA+PROBE: NOT DETECTED IU/ML
CMVPCR LOG: NOT DETECTED LOGIU/ML

## 2018-10-08 LAB — BKV DNA SPEC QL NAA+PROBE: NORMAL

## 2018-10-11 ENCOUNTER — APPOINTMENT (OUTPATIENT)
Dept: NEPHROLOGY | Facility: CLINIC | Age: 68
End: 2018-10-11
Payer: MEDICARE

## 2018-10-11 VITALS
OXYGEN SATURATION: 98 % | TEMPERATURE: 97.9 F | SYSTOLIC BLOOD PRESSURE: 133 MMHG | DIASTOLIC BLOOD PRESSURE: 71 MMHG | HEART RATE: 98 BPM | HEIGHT: 69.5 IN | RESPIRATION RATE: 16 BRPM | WEIGHT: 204 LBS | BODY MASS INDEX: 29.53 KG/M2

## 2018-10-11 DIAGNOSIS — Z87.448 PERSONAL HISTORY OF OTHER DISEASES OF URINARY SYSTEM: ICD-10-CM

## 2018-10-11 PROCEDURE — 99215 OFFICE O/P EST HI 40 MIN: CPT

## 2018-10-11 RX ORDER — ERYTHROPOIETIN 10000 [IU]/ML
10000 INJECTION, SOLUTION INTRAVENOUS; SUBCUTANEOUS
Qty: 1 | Refills: 0 | Status: DISCONTINUED | COMMUNITY
Start: 2018-09-11 | End: 2018-10-11

## 2018-10-12 LAB
ALBUMIN SERPL ELPH-MCNC: 4 G/DL
ALP BLD-CCNC: 113 U/L
ALT SERPL-CCNC: 9 U/L
ANION GAP SERPL CALC-SCNC: 15 MMOL/L
APPEARANCE: ABNORMAL
AST SERPL-CCNC: 15 U/L
BACTERIA: NEGATIVE
BASOPHILS # BLD AUTO: 0.05 K/UL
BASOPHILS NFR BLD AUTO: 0.9 %
BILIRUB SERPL-MCNC: 0.2 MG/DL
BILIRUBIN URINE: NEGATIVE
BLOOD URINE: ABNORMAL
BUN SERPL-MCNC: 24 MG/DL
CALCIUM SERPL-MCNC: 9.9 MG/DL
CHLORIDE SERPL-SCNC: 110 MMOL/L
CMV DNA SPEC QL NAA+PROBE: NOT DETECTED IU/ML
CMVPCR LOG: NOT DETECTED LOGIU/ML
CO2 SERPL-SCNC: 16 MMOL/L
COLOR: YELLOW
CREAT SERPL-MCNC: 2.91 MG/DL
CREAT SPEC-SCNC: 164 MG/DL
CREAT/PROT UR: 0.7 RATIO
EOSINOPHIL # BLD AUTO: 0.06 K/UL
EOSINOPHIL NFR BLD AUTO: 1.1 %
GLUCOSE QUALITATIVE U: NEGATIVE MG/DL
GLUCOSE SERPL-MCNC: 159 MG/DL
HBV CORE IGG+IGM SER QL: NONREACTIVE
HBV DNA # SERPL NAA+PROBE: NOT DETECTED IU/ML
HBV SURFACE AG SER QL: NONREACTIVE
HCT VFR BLD CALC: 29.3 %
HEPB DNA PCR LOG: NOT DETECTED LOGIU/ML
HGB BLD-MCNC: 8.8 G/DL
HIV1+2 AB SPEC QL IA.RAPID: NONREACTIVE
HYALINE CASTS: 5 /LPF
IMM GRANULOCYTES NFR BLD AUTO: 1.1 %
KETONES URINE: NEGATIVE
LDH SERPL-CCNC: 296 U/L
LEUKOCYTE ESTERASE URINE: ABNORMAL
LYMPHOCYTES # BLD AUTO: 0.63 K/UL
LYMPHOCYTES NFR BLD AUTO: 11.9 %
MAGNESIUM SERPL-MCNC: 1.5 MG/DL
MAN DIFF?: NORMAL
MCHC RBC-ENTMCNC: 30 GM/DL
MCHC RBC-ENTMCNC: 31.5 PG
MCV RBC AUTO: 105 FL
MICROSCOPIC-UA: NORMAL
MONOCYTES # BLD AUTO: 0.39 K/UL
MONOCYTES NFR BLD AUTO: 7.4 %
NEUTROPHILS # BLD AUTO: 4.11 K/UL
NEUTROPHILS NFR BLD AUTO: 77.6 %
NITRITE URINE: NEGATIVE
PH URINE: 5.5
PHOSPHATE SERPL-MCNC: 2.9 MG/DL
PLATELET # BLD AUTO: 151 K/UL
POTASSIUM SERPL-SCNC: 5.2 MMOL/L
PROT SERPL-MCNC: 6.8 G/DL
PROT UR-MCNC: 114 MG/DL
PROTEIN URINE: 100 MG/DL
RBC # BLD: 2.79 M/UL
RBC # FLD: 18.2 %
RED BLOOD CELLS URINE: 216 /HPF
SODIUM SERPL-SCNC: 141 MMOL/L
SPECIFIC GRAVITY URINE: 1.02
SQUAMOUS EPITHELIAL CELLS: 4 /HPF
TACROLIMUS SERPL-MCNC: 6.4 NG/ML
UROBILINOGEN URINE: NEGATIVE MG/DL
WBC # FLD AUTO: 5.3 K/UL
WHITE BLOOD CELLS URINE: 24 /HPF

## 2018-10-18 ENCOUNTER — LABORATORY RESULT (OUTPATIENT)
Age: 68
End: 2018-10-18

## 2018-10-18 ENCOUNTER — APPOINTMENT (OUTPATIENT)
Dept: TRANSPLANT | Facility: CLINIC | Age: 68
End: 2018-10-18
Payer: MEDICARE

## 2018-10-18 VITALS
WEIGHT: 209 LBS | TEMPERATURE: 97.9 F | HEART RATE: 87 BPM | OXYGEN SATURATION: 98 % | RESPIRATION RATE: 16 BRPM | BODY MASS INDEX: 29.92 KG/M2 | DIASTOLIC BLOOD PRESSURE: 78 MMHG | HEIGHT: 70 IN | SYSTOLIC BLOOD PRESSURE: 134 MMHG

## 2018-10-18 PROCEDURE — 99213 OFFICE O/P EST LOW 20 MIN: CPT

## 2018-10-22 LAB
ALBUMIN SERPL ELPH-MCNC: 4.1 G/DL
ALP BLD-CCNC: 104 U/L
ALT SERPL-CCNC: 7 U/L
ANION GAP SERPL CALC-SCNC: 13 MMOL/L
APPEARANCE: ABNORMAL
AST SERPL-CCNC: 15 U/L
BASOPHILS # BLD AUTO: 0.04 K/UL
BASOPHILS NFR BLD AUTO: 0.9 %
BILIRUB SERPL-MCNC: 0.2 MG/DL
BILIRUBIN URINE: NEGATIVE
BKV DNA SPEC QL NAA+PROBE: NORMAL
BKV DNA SPEC QL NAA+PROBE: NORMAL
BLOOD URINE: ABNORMAL
BUN SERPL-MCNC: 23 MG/DL
CALCIUM SERPL-MCNC: 9.5 MG/DL
CHLORIDE SERPL-SCNC: 107 MMOL/L
CO2 SERPL-SCNC: 19 MMOL/L
COLOR: YELLOW
CREAT SERPL-MCNC: 2.71 MG/DL
CREAT SPEC-SCNC: 211 MG/DL
CREAT/PROT UR: 1.2 RATIO
EOSINOPHIL # BLD AUTO: 0.04 K/UL
EOSINOPHIL NFR BLD AUTO: 0.9 %
GLUCOSE QUALITATIVE U: NEGATIVE MG/DL
GLUCOSE SERPL-MCNC: 147 MG/DL
HBV DNA # SERPL NAA+PROBE: NOT DETECTED IU/ML
HBV SURFACE AG SER QL: NONREACTIVE
HCT VFR BLD CALC: 29.6 %
HCV AB SER QL: NONREACTIVE
HCV RNA SERPL NAA DL=5-ACNC: NOT DETECTED IU/ML
HCV RNA SERPL NAA DL=5-ACNC: NOT DETECTED IU/ML
HCV RNA SERPL NAA+PROBE-LOG IU: NOT DETECTED LOGIU/ML
HCV RNA SERPL NAA+PROBE-LOG IU: NOT DETECTED LOGIU/ML
HCV S/CO RATIO: 0.11 S/CO
HEPB DNA PCR LOG: NOT DETECTED LOGIU/ML
HGB BLD-MCNC: 8.9 G/DL
HIV1 RNA # SERPL NAA+PROBE: NORMAL
HIV1 RNA # SERPL NAA+PROBE: NORMAL COPIES/ML
HIV1+2 AB SPEC QL IA.RAPID: NONREACTIVE
IMM GRANULOCYTES NFR BLD AUTO: 1.1 %
KETONES URINE: NEGATIVE
LDH SERPL-CCNC: 255 U/L
LEUKOCYTE ESTERASE URINE: ABNORMAL
LYMPHOCYTES # BLD AUTO: 0.65 K/UL
LYMPHOCYTES NFR BLD AUTO: 14.8 %
MAGNESIUM SERPL-MCNC: 1.5 MG/DL
MAN DIFF?: NORMAL
MCHC RBC-ENTMCNC: 30.1 GM/DL
MCHC RBC-ENTMCNC: 31.3 PG
MCV RBC AUTO: 104.2 FL
MONOCYTES # BLD AUTO: 0.62 K/UL
MONOCYTES NFR BLD AUTO: 14.1 %
NEUTROPHILS # BLD AUTO: 2.99 K/UL
NEUTROPHILS NFR BLD AUTO: 68.2 %
NITRITE URINE: NEGATIVE
PH URINE: 5.5
PHOSPHATE SERPL-MCNC: 3.3 MG/DL
PLATELET # BLD AUTO: 188 K/UL
POTASSIUM SERPL-SCNC: 4.8 MMOL/L
PROT SERPL-MCNC: 6.8 G/DL
PROT UR-MCNC: 247 MG/DL
PROTEIN URINE: 300 MG/DL
RBC # BLD: 2.84 M/UL
RBC # FLD: 17.6 %
SODIUM SERPL-SCNC: 139 MMOL/L
SPECIFIC GRAVITY URINE: 1.02
TACROLIMUS SERPL-MCNC: 9.1 NG/ML
URATE SERPL-MCNC: 7.8 MG/DL
UROBILINOGEN URINE: NEGATIVE MG/DL
VIRAL LOAD INTERP: NORMAL
VIRAL LOAD LOG: NORMAL LG COP/ML
WBC # FLD AUTO: 4.39 K/UL

## 2018-10-24 ENCOUNTER — APPOINTMENT (OUTPATIENT)
Dept: TRANSPLANT | Facility: CLINIC | Age: 68
End: 2018-10-24

## 2018-10-25 ENCOUNTER — OUTPATIENT (OUTPATIENT)
Dept: OUTPATIENT SERVICES | Facility: HOSPITAL | Age: 68
LOS: 1 days | End: 2018-10-25
Payer: MEDICARE

## 2018-10-25 DIAGNOSIS — Z90.5 ACQUIRED ABSENCE OF KIDNEY: Chronic | ICD-10-CM

## 2018-10-25 DIAGNOSIS — I77.0 ARTERIOVENOUS FISTULA, ACQUIRED: Chronic | ICD-10-CM

## 2018-10-25 DIAGNOSIS — Z94.0 KIDNEY TRANSPLANT STATUS: ICD-10-CM

## 2018-10-25 PROCEDURE — 50389 REMOVE RENAL TUBE W/FLUORO: CPT | Mod: RT

## 2018-10-25 PROCEDURE — 50389 REMOVE RENAL TUBE W/FLUORO: CPT

## 2018-10-25 PROCEDURE — C1769: CPT

## 2018-10-26 LAB
HCV RNA SERPL NAA DL=5-ACNC: NOT DETECTED IU/ML
HCV RNA SERPL NAA+PROBE-LOG IU: NOT DETECTED LOGIU/ML

## 2018-10-29 DIAGNOSIS — N13.9 OBSTRUCTIVE AND REFLUX UROPATHY, UNSPECIFIED: ICD-10-CM

## 2018-10-29 DIAGNOSIS — Z43.6 ENCOUNTER FOR ATTENTION TO OTHER ARTIFICIAL OPENINGS OF URINARY TRACT: ICD-10-CM

## 2018-11-01 ENCOUNTER — APPOINTMENT (OUTPATIENT)
Dept: TRANSPLANT | Facility: CLINIC | Age: 68
End: 2018-11-01
Payer: MEDICARE

## 2018-11-01 VITALS
TEMPERATURE: 98.3 F | HEIGHT: 70 IN | RESPIRATION RATE: 14 BRPM | WEIGHT: 210 LBS | HEART RATE: 89 BPM | OXYGEN SATURATION: 99 % | SYSTOLIC BLOOD PRESSURE: 127 MMHG | DIASTOLIC BLOOD PRESSURE: 71 MMHG | BODY MASS INDEX: 30.06 KG/M2

## 2018-11-01 PROCEDURE — 99213 OFFICE O/P EST LOW 20 MIN: CPT

## 2018-11-02 LAB
ALBUMIN SERPL ELPH-MCNC: 3.9 G/DL
ALP BLD-CCNC: 105 U/L
ALT SERPL-CCNC: 28 U/L
ANION GAP SERPL CALC-SCNC: 12 MMOL/L
APPEARANCE: CLEAR
AST SERPL-CCNC: 25 U/L
BACTERIA: ABNORMAL
BASOPHILS # BLD AUTO: 0.02 K/UL
BASOPHILS NFR BLD AUTO: 0.5 %
BILIRUB SERPL-MCNC: 0.3 MG/DL
BILIRUBIN URINE: NEGATIVE
BLOOD URINE: ABNORMAL
BUN SERPL-MCNC: 24 MG/DL
CALCIUM SERPL-MCNC: 9.7 MG/DL
CHLORIDE SERPL-SCNC: 110 MMOL/L
CO2 SERPL-SCNC: 17 MMOL/L
COLOR: YELLOW
CREAT SERPL-MCNC: 2.22 MG/DL
CREAT SPEC-SCNC: 118 MG/DL
CREAT/PROT UR: 0.6 RATIO
EOSINOPHIL # BLD AUTO: 0.09 K/UL
EOSINOPHIL NFR BLD AUTO: 2.2 %
GLUCOSE QUALITATIVE U: NEGATIVE MG/DL
GLUCOSE SERPL-MCNC: 131 MG/DL
HCT VFR BLD CALC: 32 %
HGB BLD-MCNC: 10.1 G/DL
HYALINE CASTS: 0 /LPF
IMM GRANULOCYTES NFR BLD AUTO: 2 %
KETONES URINE: NEGATIVE
LDH SERPL-CCNC: 293 U/L
LEUKOCYTE ESTERASE URINE: NEGATIVE
LYMPHOCYTES # BLD AUTO: 0.6 K/UL
LYMPHOCYTES NFR BLD AUTO: 14.7 %
MAGNESIUM SERPL-MCNC: 1.5 MG/DL
MAN DIFF?: NORMAL
MCHC RBC-ENTMCNC: 31.3 PG
MCHC RBC-ENTMCNC: 31.6 GM/DL
MCV RBC AUTO: 99.1 FL
MICROSCOPIC-UA: NORMAL
MONOCYTES # BLD AUTO: 0.35 K/UL
MONOCYTES NFR BLD AUTO: 8.6 %
NEUTROPHILS # BLD AUTO: 2.94 K/UL
NEUTROPHILS NFR BLD AUTO: 72 %
NITRITE URINE: NEGATIVE
PH URINE: 5.5
PHOSPHATE SERPL-MCNC: 2.6 MG/DL
PLATELET # BLD AUTO: 142 K/UL
POTASSIUM SERPL-SCNC: 5.2 MMOL/L
PROT SERPL-MCNC: 6.5 G/DL
PROT UR-MCNC: 72 MG/DL
PROTEIN URINE: 100 MG/DL
RBC # BLD: 3.23 M/UL
RBC # FLD: 15.3 %
RED BLOOD CELLS URINE: 150 /HPF
SODIUM SERPL-SCNC: 139 MMOL/L
SPECIFIC GRAVITY URINE: 1.02
SQUAMOUS EPITHELIAL CELLS: 2 /HPF
TACROLIMUS SERPL-MCNC: 6.4 NG/ML
URATE SERPL-MCNC: 6.1 MG/DL
UROBILINOGEN URINE: NEGATIVE MG/DL
WBC # FLD AUTO: 4.08 K/UL
WHITE BLOOD CELLS URINE: 11 /HPF

## 2018-11-05 LAB — BKV DNA SPEC QL NAA+PROBE: NORMAL

## 2018-11-13 ENCOUNTER — APPOINTMENT (OUTPATIENT)
Dept: TRANSPLANT | Facility: CLINIC | Age: 68
End: 2018-11-13

## 2018-11-13 ENCOUNTER — APPOINTMENT (OUTPATIENT)
Dept: NEPHROLOGY | Facility: CLINIC | Age: 68
End: 2018-11-13

## 2018-11-15 ENCOUNTER — APPOINTMENT (OUTPATIENT)
Dept: NEPHROLOGY | Facility: CLINIC | Age: 68
End: 2018-11-15
Payer: MEDICARE

## 2018-11-15 ENCOUNTER — LABORATORY RESULT (OUTPATIENT)
Age: 68
End: 2018-11-15

## 2018-11-15 VITALS
DIASTOLIC BLOOD PRESSURE: 85 MMHG | OXYGEN SATURATION: 97 % | TEMPERATURE: 98.3 F | HEIGHT: 70 IN | SYSTOLIC BLOOD PRESSURE: 168 MMHG | BODY MASS INDEX: 29.06 KG/M2 | HEART RATE: 101 BPM | WEIGHT: 203 LBS | RESPIRATION RATE: 16 BRPM

## 2018-11-15 DIAGNOSIS — B19.20 UNSPECIFIED VIRAL HEPATITIS C W/OUT HEPATIC COMA: ICD-10-CM

## 2018-11-15 PROCEDURE — 99215 OFFICE O/P EST HI 40 MIN: CPT

## 2018-11-15 RX ORDER — NYSTATIN 100000 [USP'U]/ML
100000 SUSPENSION ORAL 4 TIMES DAILY
Qty: 2 | Refills: 2 | Status: DISCONTINUED | COMMUNITY
Start: 2018-07-19 | End: 2018-11-15

## 2018-11-15 RX ORDER — VELPATASVIR AND SOFOSBUVIR 100; 400 MG/1; MG/1
400-100 TABLET, FILM COATED ORAL DAILY
Qty: 28 | Refills: 2 | Status: DISCONTINUED | COMMUNITY
Start: 2018-07-26 | End: 2018-11-15

## 2018-11-16 LAB
ALBUMIN SERPL ELPH-MCNC: 4 G/DL
ALP BLD-CCNC: 114 U/L
ALT SERPL-CCNC: 11 U/L
ANION GAP SERPL CALC-SCNC: 12 MMOL/L
APPEARANCE: ABNORMAL
AST SERPL-CCNC: 22 U/L
BACTERIA: ABNORMAL
BASOPHILS # BLD AUTO: 0.07 K/UL
BASOPHILS NFR BLD AUTO: 1.8 %
BILIRUB SERPL-MCNC: 0.2 MG/DL
BILIRUBIN URINE: NEGATIVE
BLOOD URINE: ABNORMAL
BUN SERPL-MCNC: 28 MG/DL
CALCIUM SERPL-MCNC: 10.2 MG/DL
CHLORIDE SERPL-SCNC: 107 MMOL/L
CO2 SERPL-SCNC: 23 MMOL/L
COLOR: YELLOW
CREAT SERPL-MCNC: 2.28 MG/DL
CREAT SPEC-SCNC: 209 MG/DL
CREAT/PROT UR: 0.5 RATIO
EOSINOPHIL # BLD AUTO: 0.04 K/UL
EOSINOPHIL NFR BLD AUTO: 0.9 %
GLUCOSE QUALITATIVE U: 100 MG/DL
GLUCOSE SERPL-MCNC: 144 MG/DL
HCT VFR BLD CALC: 35.8 %
HGB BLD-MCNC: 11.3 G/DL
HYALINE CASTS: 0 /LPF
KETONES URINE: NEGATIVE
LDH SERPL-CCNC: 299 U/L
LEUKOCYTE ESTERASE URINE: ABNORMAL
LYMPHOCYTES # BLD AUTO: 0.41 K/UL
LYMPHOCYTES NFR BLD AUTO: 10.6 %
MAGNESIUM SERPL-MCNC: 1.9 MG/DL
MAN DIFF?: NORMAL
MCHC RBC-ENTMCNC: 31.6 GM/DL
MCHC RBC-ENTMCNC: 32.7 PG
MCV RBC AUTO: 103.5 FL
MICROSCOPIC-UA: NORMAL
MONOCYTES # BLD AUTO: 0.31 K/UL
MONOCYTES NFR BLD AUTO: 8 %
NEUTROPHILS # BLD AUTO: 3.01 K/UL
NEUTROPHILS NFR BLD AUTO: 76.9 %
NITRITE URINE: NEGATIVE
PH URINE: 5
PHOSPHATE SERPL-MCNC: 3 MG/DL
PLATELET # BLD AUTO: 146 K/UL
POTASSIUM SERPL-SCNC: 5.6 MMOL/L
PROT SERPL-MCNC: 7 G/DL
PROT UR-MCNC: 97 MG/DL
PROTEIN URINE: 100 MG/DL
RBC # BLD: 3.46 M/UL
RBC # FLD: 15 %
RED BLOOD CELLS URINE: 122 /HPF
SODIUM SERPL-SCNC: 142 MMOL/L
SPECIFIC GRAVITY URINE: 1.02
SQUAMOUS EPITHELIAL CELLS: 1 /HPF
TACROLIMUS SERPL-MCNC: 7.3 NG/ML
UROBILINOGEN URINE: NEGATIVE MG/DL
WBC # FLD AUTO: 3.91 K/UL
WHITE BLOOD CELLS URINE: 29 /HPF

## 2018-11-28 LAB
BKV DNA SPEC QL NAA+PROBE: NOT DETECTED COPIES/ML
CMV DNA SPEC QL NAA+PROBE: NOT DETECTED IU/ML
CMVPCR LOG: NOT DETECTED LOGIU/ML
HCV RNA SERPL NAA DL=5-ACNC: NOT DETECTED IU/ML
HCV RNA SERPL NAA+PROBE-LOG IU: NOT DETECTED LOGIU/ML

## 2018-12-04 ENCOUNTER — LABORATORY RESULT (OUTPATIENT)
Age: 68
End: 2018-12-04

## 2018-12-04 ENCOUNTER — APPOINTMENT (OUTPATIENT)
Dept: TRANSPLANT | Facility: CLINIC | Age: 68
End: 2018-12-04
Payer: MEDICARE

## 2018-12-04 VITALS
HEIGHT: 70 IN | SYSTOLIC BLOOD PRESSURE: 145 MMHG | RESPIRATION RATE: 19 BRPM | HEART RATE: 92 BPM | TEMPERATURE: 98.3 F | OXYGEN SATURATION: 98 % | BODY MASS INDEX: 29.92 KG/M2 | DIASTOLIC BLOOD PRESSURE: 74 MMHG | WEIGHT: 209 LBS

## 2018-12-04 LAB
ALBUMIN SERPL ELPH-MCNC: 3.8 G/DL
ALP BLD-CCNC: 103 U/L
ALT SERPL-CCNC: 17 U/L
ANION GAP SERPL CALC-SCNC: 12 MMOL/L
APPEARANCE: ABNORMAL
AST SERPL-CCNC: 19 U/L
BACTERIA: NEGATIVE
BASOPHILS # BLD AUTO: 0.02 K/UL
BASOPHILS NFR BLD AUTO: 1 %
BILIRUB SERPL-MCNC: 0.2 MG/DL
BILIRUBIN URINE: NEGATIVE
BLOOD URINE: ABNORMAL
BUN SERPL-MCNC: 19 MG/DL
CALCIUM SERPL-MCNC: 9.4 MG/DL
CHLORIDE SERPL-SCNC: 106 MMOL/L
CO2 SERPL-SCNC: 22 MMOL/L
COLOR: YELLOW
CREAT SERPL-MCNC: 1.9 MG/DL
CREAT SPEC-SCNC: 157 MG/DL
CREAT/PROT UR: 0.3 RATIO
EOSINOPHIL # BLD AUTO: 0.07 K/UL
EOSINOPHIL NFR BLD AUTO: 3.6 %
GLUCOSE QUALITATIVE U: NEGATIVE MG/DL
GLUCOSE SERPL-MCNC: 137 MG/DL
HCT VFR BLD CALC: 31.7 %
HGB BLD-MCNC: 10 G/DL
HYALINE CASTS: 4 /LPF
IMM GRANULOCYTES NFR BLD AUTO: 1.6 %
KETONES URINE: NEGATIVE
LDH SERPL-CCNC: 230 U/L
LEUKOCYTE ESTERASE URINE: ABNORMAL
LYMPHOCYTES # BLD AUTO: 0.55 K/UL
LYMPHOCYTES NFR BLD AUTO: 28.5 %
MAGNESIUM SERPL-MCNC: 1.6 MG/DL
MAN DIFF?: NORMAL
MCHC RBC-ENTMCNC: 31.5 GM/DL
MCHC RBC-ENTMCNC: 32.1 PG
MCV RBC AUTO: 101.6 FL
MICROSCOPIC-UA: NORMAL
MONOCYTES # BLD AUTO: 0.16 K/UL
MONOCYTES NFR BLD AUTO: 8.3 %
NEUTROPHILS # BLD AUTO: 1.1 K/UL
NEUTROPHILS NFR BLD AUTO: 57 %
NITRITE URINE: NEGATIVE
PH URINE: 5.5
PHOSPHATE SERPL-MCNC: 3.2 MG/DL
PLATELET # BLD AUTO: 137 K/UL
POTASSIUM SERPL-SCNC: 4.7 MMOL/L
PROT SERPL-MCNC: 6.4 G/DL
PROT UR-MCNC: 54 MG/DL
PROTEIN URINE: 30 MG/DL
RBC # BLD: 3.12 M/UL
RBC # FLD: 14.3 %
RED BLOOD CELLS URINE: 5 /HPF
SODIUM SERPL-SCNC: 140 MMOL/L
SPECIFIC GRAVITY URINE: 1.02
SQUAMOUS EPITHELIAL CELLS: 0 /HPF
TACROLIMUS SERPL-MCNC: 5.9 NG/ML
URATE SERPL-MCNC: 5.9 MG/DL
UROBILINOGEN URINE: NEGATIVE MG/DL
WBC # FLD AUTO: 1.93 K/UL
WHITE BLOOD CELLS URINE: 27 /HPF

## 2018-12-04 PROCEDURE — 99213 OFFICE O/P EST LOW 20 MIN: CPT

## 2018-12-05 LAB — BKV DNA SPEC QL NAA+PROBE: NOT DETECTED COPIES/ML

## 2018-12-31 ENCOUNTER — RX RENEWAL (OUTPATIENT)
Age: 68
End: 2018-12-31

## 2019-01-03 ENCOUNTER — LABORATORY RESULT (OUTPATIENT)
Age: 69
End: 2019-01-03

## 2019-01-03 ENCOUNTER — APPOINTMENT (OUTPATIENT)
Dept: NEPHROLOGY | Facility: CLINIC | Age: 69
End: 2019-01-03
Payer: MEDICARE

## 2019-01-03 VITALS
BODY MASS INDEX: 30.24 KG/M2 | DIASTOLIC BLOOD PRESSURE: 76 MMHG | OXYGEN SATURATION: 98 % | WEIGHT: 216 LBS | HEART RATE: 88 BPM | SYSTOLIC BLOOD PRESSURE: 159 MMHG | HEIGHT: 71 IN | RESPIRATION RATE: 16 BRPM | TEMPERATURE: 98.1 F

## 2019-01-03 PROCEDURE — 99214 OFFICE O/P EST MOD 30 MIN: CPT

## 2019-01-03 RX ORDER — VALGANCICLOVIR 450 MG/1
450 TABLET, FILM COATED ORAL DAILY
Qty: 30 | Refills: 0 | Status: DISCONTINUED | COMMUNITY
Start: 2018-10-11 | End: 2019-01-03

## 2019-01-03 RX ORDER — DARBEPOETIN ALFA 100 UG/ML
100 SOLUTION INTRAVENOUS; SUBCUTANEOUS
Refills: 5 | Status: DISCONTINUED | COMMUNITY
Start: 2018-10-11 | End: 2019-01-03

## 2019-01-03 RX ORDER — SODIUM POLYSTYRENE SULFONATE 15 G/60ML
15 SUSPENSION ORAL; RECTAL
Qty: 1 | Refills: 0 | Status: DISCONTINUED | COMMUNITY
Start: 2018-09-27 | End: 2019-01-03

## 2019-01-07 LAB
25(OH)D3 SERPL-MCNC: 8.6 NG/ML
ALBUMIN SERPL ELPH-MCNC: 3.9 G/DL
ALP BLD-CCNC: 106 U/L
ALT SERPL-CCNC: 39 U/L
ANION GAP SERPL CALC-SCNC: 13 MMOL/L
APPEARANCE: CLEAR
AST SERPL-CCNC: 30 U/L
BASOPHILS # BLD AUTO: 0.03 K/UL
BASOPHILS NFR BLD AUTO: 1.5 %
BILIRUB SERPL-MCNC: 0.2 MG/DL
BILIRUBIN URINE: NEGATIVE
BKV DNA SPEC QL NAA+PROBE: NOT DETECTED COPIES/ML
BLOOD URINE: ABNORMAL
BUN SERPL-MCNC: 24 MG/DL
CALCIUM SERPL-MCNC: 9.6 MG/DL
CHLORIDE SERPL-SCNC: 108 MMOL/L
CHOLEST SERPL-MCNC: 129 MG/DL
CHOLEST/HDLC SERPL: 1.7 RATIO
CMV DNA SPEC QL NAA+PROBE: NOT DETECTED IU/ML
CMVPCR LOG: NOT DETECTED LOGIU/ML
CO2 SERPL-SCNC: 23 MMOL/L
COLOR: YELLOW
CREAT SERPL-MCNC: 1.92 MG/DL
CREAT SPEC-SCNC: 149 MG/DL
CREAT/PROT UR: 0.3 RATIO
EOSINOPHIL # BLD AUTO: 0.1 K/UL
EOSINOPHIL NFR BLD AUTO: 5 %
GLUCOSE QUALITATIVE U: NEGATIVE MG/DL
GLUCOSE SERPL-MCNC: 142 MG/DL
HBA1C MFR BLD HPLC: 5.5 %
HBV DNA # SERPL NAA+PROBE: NOT DETECTED IU/ML
HBV SURFACE AG SER QL: NONREACTIVE
HCT VFR BLD CALC: 33.7 %
HCV AB SER QL: NONREACTIVE
HCV RNA SERPL NAA DL=5-ACNC: NOT DETECTED IU/ML
HCV RNA SERPL NAA+PROBE-LOG IU: NOT DETECTED LOGIU/ML
HCV S/CO RATIO: 0.06 S/CO
HDLC SERPL-MCNC: 78 MG/DL
HEPB DNA PCR LOG: NOT DETECTED LOGIU/ML
HGB BLD-MCNC: 10.5 G/DL
HIV1 RNA # SERPL NAA+PROBE: NORMAL
HIV1 RNA # SERPL NAA+PROBE: NORMAL COPIES/ML
HIV1+2 AB SPEC QL IA.RAPID: NONREACTIVE
IMM GRANULOCYTES NFR BLD AUTO: 1.5 %
KETONES URINE: NEGATIVE
LDH SERPL-CCNC: 194 U/L
LDLC SERPL CALC-MCNC: 30 MG/DL
LEUKOCYTE ESTERASE URINE: ABNORMAL
LYMPHOCYTES # BLD AUTO: 0.41 K/UL
LYMPHOCYTES NFR BLD AUTO: 20.5 %
MAGNESIUM SERPL-MCNC: 1.5 MG/DL
MAN DIFF?: NORMAL
MCHC RBC-ENTMCNC: 31.2 GM/DL
MCHC RBC-ENTMCNC: 31.9 PG
MCV RBC AUTO: 102.4 FL
MONOCYTES # BLD AUTO: 0.18 K/UL
MONOCYTES NFR BLD AUTO: 9 %
NEUTROPHILS # BLD AUTO: 1.25 K/UL
NEUTROPHILS NFR BLD AUTO: 62.5 %
NITRITE URINE: NEGATIVE
PH URINE: 5.5
PHOSPHATE SERPL-MCNC: 3 MG/DL
PLATELET # BLD AUTO: 145 K/UL
POTASSIUM SERPL-SCNC: 4.8 MMOL/L
PROT SERPL-MCNC: 6.5 G/DL
PROT UR-MCNC: 43 MG/DL
PROTEIN URINE: 30 MG/DL
RBC # BLD: 3.29 M/UL
RBC # FLD: 14.3 %
SODIUM SERPL-SCNC: 144 MMOL/L
SPECIFIC GRAVITY URINE: 1.02
TACROLIMUS SERPL-MCNC: 9.8 NG/ML
TRIGL SERPL-MCNC: 107 MG/DL
URATE SERPL-MCNC: 5.8 MG/DL
UROBILINOGEN URINE: NEGATIVE MG/DL
VIRAL LOAD INTERP: NORMAL
VIRAL LOAD LOG: NORMAL LG COP/ML
WBC # FLD AUTO: 2 K/UL

## 2019-01-15 ENCOUNTER — APPOINTMENT (OUTPATIENT)
Dept: TRANSPLANT | Facility: CLINIC | Age: 69
End: 2019-01-15
Payer: MEDICARE

## 2019-01-15 VITALS
SYSTOLIC BLOOD PRESSURE: 138 MMHG | HEART RATE: 96 BPM | WEIGHT: 219 LBS | DIASTOLIC BLOOD PRESSURE: 71 MMHG | BODY MASS INDEX: 30.66 KG/M2 | TEMPERATURE: 98.4 F | HEIGHT: 71 IN | RESPIRATION RATE: 17 BRPM

## 2019-01-15 LAB
BASOPHILS # BLD AUTO: 0.03 K/UL
BASOPHILS NFR BLD AUTO: 1.1 %
EOSINOPHIL # BLD AUTO: 0.05 K/UL
EOSINOPHIL NFR BLD AUTO: 1.9 %
HCT VFR BLD CALC: 35.9 %
HGB BLD-MCNC: 11.1 G/DL
IMM GRANULOCYTES NFR BLD AUTO: 0.7 %
LYMPHOCYTES # BLD AUTO: 0.82 K/UL
LYMPHOCYTES NFR BLD AUTO: 30.4 %
MAN DIFF?: NORMAL
MCHC RBC-ENTMCNC: 30.9 GM/DL
MCHC RBC-ENTMCNC: 31.4 PG
MCV RBC AUTO: 101.4 FL
MONOCYTES # BLD AUTO: 0.33 K/UL
MONOCYTES NFR BLD AUTO: 12.2 %
NEUTROPHILS # BLD AUTO: 1.45 K/UL
NEUTROPHILS NFR BLD AUTO: 53.7 %
PLATELET # BLD AUTO: 144 K/UL
RBC # BLD: 3.54 M/UL
RBC # FLD: 14.2 %
WBC # FLD AUTO: 2.7 K/UL

## 2019-01-15 PROCEDURE — 99213 OFFICE O/P EST LOW 20 MIN: CPT

## 2019-01-15 NOTE — PHYSICAL EXAM
[General Appearance - Alert] : alert [General Appearance - In No Acute Distress] : in no acute distress [Auscultation Breath Sounds / Voice Sounds] : lungs were clear to auscultation bilaterally [Heart Rate And Rhythm] : heart rate was normal and rhythm regular [Heart Sounds] : normal S1 and S2 [Heart Sounds Gallop] : no gallops [Murmurs] : no murmurs [Heart Sounds Pericardial Friction Rub] : no pericardial rub [Bowel Sounds] : normal bowel sounds [Abdomen Soft] : soft [Abdomen Tenderness] : non-tender [] : no hepato-splenomegaly [Abdomen Mass (___ Cm)] : no abdominal mass palpated [Clean] : clean [Dry] : dry [Healing Well] : healing well [No Edema] : no edema

## 2019-01-15 NOTE — ASSESSMENT
[FreeTextEntry1] : doing well post  donor kidney transplant\par immunosuppression fk , mmf and steroids\par has jj stent inserted in october for mild hydro\par will schedule for removal\par

## 2019-01-15 NOTE — REASON FOR VISIT
[de-identified] : post  donor kidney transplant 2018\par doing well. creatinine stable around 1.9\par has jj stent placed in October for mild hydro\par immunosuppression fk, mmf and steroids\par still has diarrhea and follows with GI

## 2019-01-21 ENCOUNTER — FORM ENCOUNTER (OUTPATIENT)
Age: 69
End: 2019-01-21

## 2019-01-22 ENCOUNTER — OUTPATIENT (OUTPATIENT)
Dept: OUTPATIENT SERVICES | Facility: HOSPITAL | Age: 69
LOS: 1 days | End: 2019-01-22
Payer: MEDICARE

## 2019-01-22 ENCOUNTER — APPOINTMENT (OUTPATIENT)
Dept: ULTRASOUND IMAGING | Facility: HOSPITAL | Age: 69
End: 2019-01-22
Payer: MEDICARE

## 2019-01-22 DIAGNOSIS — Z94.0 KIDNEY TRANSPLANT STATUS: ICD-10-CM

## 2019-01-22 DIAGNOSIS — I77.0 ARTERIOVENOUS FISTULA, ACQUIRED: Chronic | ICD-10-CM

## 2019-01-22 DIAGNOSIS — Z00.00 ENCOUNTER FOR GENERAL ADULT MEDICAL EXAMINATION WITHOUT ABNORMAL FINDINGS: ICD-10-CM

## 2019-01-22 DIAGNOSIS — Z90.5 ACQUIRED ABSENCE OF KIDNEY: Chronic | ICD-10-CM

## 2019-01-22 PROCEDURE — 76776 US EXAM K TRANSPL W/DOPPLER: CPT | Mod: 26,RT

## 2019-01-22 PROCEDURE — 76776 US EXAM K TRANSPL W/DOPPLER: CPT

## 2019-02-07 ENCOUNTER — APPOINTMENT (OUTPATIENT)
Dept: TRANSPLANT | Facility: CLINIC | Age: 69
End: 2019-02-07

## 2019-02-08 LAB
ALBUMIN SERPL ELPH-MCNC: 4.2 G/DL
ALP BLD-CCNC: 95 U/L
ALT SERPL-CCNC: 23 U/L
ANION GAP SERPL CALC-SCNC: 11 MMOL/L
APPEARANCE: CLEAR
AST SERPL-CCNC: 26 U/L
BACTERIA: ABNORMAL
BASOPHILS # BLD AUTO: 0.04 K/UL
BASOPHILS NFR BLD AUTO: 0.7 %
BILIRUB SERPL-MCNC: 0.3 MG/DL
BILIRUBIN URINE: NEGATIVE
BKV DNA SPEC QL NAA+PROBE: NOT DETECTED COPIES/ML
BLOOD URINE: ABNORMAL
BUN SERPL-MCNC: 25 MG/DL
CALCIUM SERPL-MCNC: 9.9 MG/DL
CHLORIDE SERPL-SCNC: 105 MMOL/L
CO2 SERPL-SCNC: 24 MMOL/L
COLOR: YELLOW
CREAT SERPL-MCNC: 2.13 MG/DL
CREAT SPEC-SCNC: 122 MG/DL
CREAT/PROT UR: 0.2 RATIO
EOSINOPHIL # BLD AUTO: 0.19 K/UL
EOSINOPHIL NFR BLD AUTO: 3.3 %
GLUCOSE QUALITATIVE U: NEGATIVE MG/DL
GLUCOSE SERPL-MCNC: 121 MG/DL
HCT VFR BLD CALC: 36.5 %
HGB BLD-MCNC: 11.1 G/DL
HYALINE CASTS: 1 /LPF
IMM GRANULOCYTES NFR BLD AUTO: 1 %
KETONES URINE: NEGATIVE
LDH SERPL-CCNC: 202 U/L
LEUKOCYTE ESTERASE URINE: NEGATIVE
LYMPHOCYTES # BLD AUTO: 0.89 K/UL
LYMPHOCYTES NFR BLD AUTO: 15.2 %
MAGNESIUM SERPL-MCNC: 1.6 MG/DL
MAN DIFF?: NORMAL
MCHC RBC-ENTMCNC: 30.4 GM/DL
MCHC RBC-ENTMCNC: 30.4 PG
MCV RBC AUTO: 100 FL
MICROSCOPIC-UA: NORMAL
MONOCYTES # BLD AUTO: 0.56 K/UL
MONOCYTES NFR BLD AUTO: 9.6 %
NEUTROPHILS # BLD AUTO: 4.1 K/UL
NEUTROPHILS NFR BLD AUTO: 70.2 %
NITRITE URINE: NEGATIVE
PH URINE: 5.5
PHOSPHATE SERPL-MCNC: 3.4 MG/DL
PLATELET # BLD AUTO: 126 K/UL
POTASSIUM SERPL-SCNC: 4.8 MMOL/L
PROT SERPL-MCNC: 7 G/DL
PROT UR-MCNC: 22 MG/DL
PROTEIN URINE: ABNORMAL MG/DL
RBC # BLD: 3.65 M/UL
RBC # FLD: 14.3 %
RED BLOOD CELLS URINE: 25 /HPF
SODIUM SERPL-SCNC: 140 MMOL/L
SPECIFIC GRAVITY URINE: 1.02
SQUAMOUS EPITHELIAL CELLS: 1 /HPF
TACROLIMUS SERPL-MCNC: 17.6 NG/ML
URATE SERPL-MCNC: 6.1 MG/DL
URINE COMMENTS: NORMAL
UROBILINOGEN URINE: NEGATIVE MG/DL
WBC # FLD AUTO: 5.84 K/UL
WHITE BLOOD CELLS URINE: 15 /HPF

## 2019-02-11 ENCOUNTER — LABORATORY RESULT (OUTPATIENT)
Age: 69
End: 2019-02-11

## 2019-02-11 ENCOUNTER — APPOINTMENT (OUTPATIENT)
Dept: TRANSPLANT | Facility: CLINIC | Age: 69
End: 2019-02-11

## 2019-02-11 LAB
25(OH)D3 SERPL-MCNC: 10 NG/ML
ALBUMIN SERPL ELPH-MCNC: 4.3 G/DL
ALP BLD-CCNC: 94 U/L
ALT SERPL-CCNC: 22 U/L
ANION GAP SERPL CALC-SCNC: 13 MMOL/L
APPEARANCE: CLEAR
AST SERPL-CCNC: 23 U/L
BASOPHILS # BLD AUTO: 0.02 K/UL
BASOPHILS NFR BLD AUTO: 0.4 %
BILIRUB SERPL-MCNC: 0.3 MG/DL
BILIRUBIN URINE: NEGATIVE
BLOOD URINE: ABNORMAL
BUN SERPL-MCNC: 26 MG/DL
CALCIUM SERPL-MCNC: 10.1 MG/DL
CHLORIDE SERPL-SCNC: 109 MMOL/L
CHOLEST SERPL-MCNC: 133 MG/DL
CHOLEST/HDLC SERPL: 1.9 RATIO
CO2 SERPL-SCNC: 22 MMOL/L
COLOR: YELLOW
CREAT SERPL-MCNC: 2.28 MG/DL
CREAT SPEC-SCNC: 173 MG/DL
CREAT/PROT UR: 0.2 RATIO
EOSINOPHIL # BLD AUTO: 0.09 K/UL
EOSINOPHIL NFR BLD AUTO: 1.7 %
GLUCOSE QUALITATIVE U: NEGATIVE MG/DL
GLUCOSE SERPL-MCNC: 117 MG/DL
HCT VFR BLD CALC: 37.5 %
HDLC SERPL-MCNC: 70 MG/DL
HGB BLD-MCNC: 11.3 G/DL
IMM GRANULOCYTES NFR BLD AUTO: 0.2 %
KETONES URINE: NEGATIVE
LDH SERPL-CCNC: 195 U/L
LDLC SERPL CALC-MCNC: 36 MG/DL
LEUKOCYTE ESTERASE URINE: ABNORMAL
LYMPHOCYTES # BLD AUTO: 0.6 K/UL
LYMPHOCYTES NFR BLD AUTO: 11.4 %
MAGNESIUM SERPL-MCNC: 1.5 MG/DL
MAN DIFF?: NORMAL
MCHC RBC-ENTMCNC: 30.1 GM/DL
MCHC RBC-ENTMCNC: 30.2 PG
MCV RBC AUTO: 100.3 FL
MONOCYTES # BLD AUTO: 0.4 K/UL
MONOCYTES NFR BLD AUTO: 7.6 %
NEUTROPHILS # BLD AUTO: 4.16 K/UL
NEUTROPHILS NFR BLD AUTO: 78.7 %
NITRITE URINE: NEGATIVE
PH URINE: 5.5
PHOSPHATE SERPL-MCNC: 3.8 MG/DL
PLATELET # BLD AUTO: 143 K/UL
POTASSIUM SERPL-SCNC: 5 MMOL/L
PROT SERPL-MCNC: 6.9 G/DL
PROT UR-MCNC: 39 MG/DL
PROTEIN URINE: 30 MG/DL
RBC # BLD: 3.74 M/UL
RBC # FLD: 14.5 %
SODIUM SERPL-SCNC: 144 MMOL/L
SPECIFIC GRAVITY URINE: 1.02
TACROLIMUS SERPL-MCNC: 5.9 NG/ML
TRIGL SERPL-MCNC: 134 MG/DL
URATE SERPL-MCNC: 6.3 MG/DL
UROBILINOGEN URINE: NEGATIVE MG/DL
WBC # FLD AUTO: 5.28 K/UL

## 2019-02-12 ENCOUNTER — OUTPATIENT (OUTPATIENT)
Dept: OUTPATIENT SERVICES | Facility: HOSPITAL | Age: 69
LOS: 1 days | End: 2019-02-12
Payer: MEDICARE

## 2019-02-12 VITALS
OXYGEN SATURATION: 98 % | HEART RATE: 93 BPM | SYSTOLIC BLOOD PRESSURE: 144 MMHG | DIASTOLIC BLOOD PRESSURE: 79 MMHG | HEIGHT: 70 IN | WEIGHT: 233.03 LBS | RESPIRATION RATE: 16 BRPM | TEMPERATURE: 98 F

## 2019-02-12 DIAGNOSIS — E11.9 TYPE 2 DIABETES MELLITUS WITHOUT COMPLICATIONS: ICD-10-CM

## 2019-02-12 DIAGNOSIS — Z98.890 OTHER SPECIFIED POSTPROCEDURAL STATES: Chronic | ICD-10-CM

## 2019-02-12 DIAGNOSIS — Z94.0 KIDNEY TRANSPLANT STATUS: ICD-10-CM

## 2019-02-12 DIAGNOSIS — Z90.5 ACQUIRED ABSENCE OF KIDNEY: Chronic | ICD-10-CM

## 2019-02-12 DIAGNOSIS — I10 ESSENTIAL (PRIMARY) HYPERTENSION: ICD-10-CM

## 2019-02-12 DIAGNOSIS — Z46.6 ENCOUNTER FOR FITTING AND ADJUSTMENT OF URINARY DEVICE: ICD-10-CM

## 2019-02-12 DIAGNOSIS — I77.0 ARTERIOVENOUS FISTULA, ACQUIRED: Chronic | ICD-10-CM

## 2019-02-12 DIAGNOSIS — Z01.818 ENCOUNTER FOR OTHER PREPROCEDURAL EXAMINATION: ICD-10-CM

## 2019-02-12 DIAGNOSIS — Z94.0 KIDNEY TRANSPLANT STATUS: Chronic | ICD-10-CM

## 2019-02-12 LAB
BKV DNA SPEC QL NAA+PROBE: NOT DETECTED COPIES/ML
HBA1C MFR BLD HPLC: 5.8 %

## 2019-02-12 PROCEDURE — G0463: CPT

## 2019-02-12 PROCEDURE — 87086 URINE CULTURE/COLONY COUNT: CPT

## 2019-02-12 NOTE — H&P PST ADULT - PMH
ESRD (end stage renal disease)  On Dialysis ' 2015 to 7/2018  HTN (hypertension)    Kidney neoplasm  dx: ' 2015     s/p bilateral Nephrectomy ' 2015  Kidney transplant recipient  7/2018  @ [facility]    Cadaver Donor  Type 2 diabetes mellitus  dx: '88 Anal fissure  dx: ' 2018   No surgery  Benign prostatic hypertrophy    Bowel obstruction  ' 2017   surgically treated  ESRD (end stage renal disease)  On Dialysis ' 2015 to 7/2018  Hepatitis C  In Donor Kidney: patient received treatemnt for Hep. C: post treatment patinet tested Negative Hep C  HTN (hypertension)    Kidney neoplasm  dx: ' 2015     s/p bilateral Nephrectomy ' 2015  Kidney transplant recipient  7/2018  @ [facility]    Cadaver Donor  Medial meniscus tear  ' 90's  Right  Type 2 diabetes mellitus  dx: '88 Anal fissure  dx: ' 2018   No surgery  Benign prostatic hypertrophy    Bowel obstruction  ' 2017   surgically treated  ESRD (end stage renal disease)  On Dialysis ' 2015 to 7/2018  Hepatitis C  In Donor Kidney: patient received treatment for Hep. C : post treatment: patient  tested Negative for Hep C  HTN (hypertension)    Kidney neoplasm  dx: ' 2015 : Left      s/p bilateral Nephrectomy ' 2015  Medial meniscus tear  ' 90's  Right  Type 2 diabetes mellitus  dx: '88

## 2019-02-12 NOTE — H&P PST ADULT - NSANTHOSAYNRD_GEN_A_CORE
Neck: 16.5 in./No. FER screening performed.  STOP BANG Legend: 0-2 = LOW Risk; 3-4 = INTERMEDIATE Risk; 5-8 = HIGH Risk

## 2019-02-12 NOTE — H&P PST ADULT - HISTORY OF PRESENT ILLNESS
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 CIT 22 hours.  Simulect induction. His post-operative course was complicated by delayed graft function requiring TIW HD and PO Lasix 80mg BID.   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  Patient's creatinine had been improving as an outpatient but recently found to have a creatine of 5. US revealed some concern for TRAS, perinephric collection and hydronephrosis. Patient was sent to IR for retrograde nephrostogram and placement of nephrostomy tube with drainage of perinephric collection.  On arrival to IR patient was found to be hyperkalemic to 6.7.  Patient was admitted for medical management of hyperkalemia with procedure delayed. Denies any symptoms, no N/V/D, no CP, no SOB. 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 This is a 67 y/o male with PMH: HTN, Type 2 Diabetes, ( ' 15) Left Kidney Neoplasm: s/p Bilateral Nephrectomy resulting in  ESRD: s/p ( 7/2018): Kidney Transplant @ [facility], Type 2 Diabetes. Now scheduled: Cystoscopy, Double J Stent Removal .

## 2019-02-12 NOTE — H&P PST ADULT - PSH
AV fistula  2/2013/ left forearm  S/p nephrectomy  left 9/15/2015   + Cancer  S/p nephrectomy  right 12/10/2015   benign AV fistula  2/2013/ left forearm  H/O ileostomy  ' 2017   for 3 months. s/p Reversal  S/p nephrectomy  right 12/10/2015   benign  S/p nephrectomy  left 9/15/2015   + Cancer  S/P right knee arthroscopy  ' 90's AV fistula  2013/ left forearm  H/O ileostomy  '    for 3 months. s/p Reversal  Kidney transplant recipient  2018  @ [facility] :  +  Hep C Donor  S/p nephrectomy  right 12/10/2015   benign  S/p nephrectomy  left 9/15/2015   + Cancer  S/P right knee arthroscopy

## 2019-02-13 PROBLEM — B19.20 UNSPECIFIED VIRAL HEPATITIS C WITHOUT HEPATIC COMA: Chronic | Status: ACTIVE | Noted: 2019-02-12

## 2019-02-13 PROBLEM — N18.6 END STAGE RENAL DISEASE: Chronic | Status: INACTIVE | Noted: 2018-04-03 | Resolved: 2019-02-12

## 2019-02-13 LAB
CMV DNA SPEC QL NAA+PROBE: NOT DETECTED IU/ML
CMVPCR LOG: NOT DETECTED LOGIU/ML
CULTURE RESULTS: SIGNIFICANT CHANGE UP
SPECIMEN SOURCE: SIGNIFICANT CHANGE UP

## 2019-02-18 ENCOUNTER — TRANSCRIPTION ENCOUNTER (OUTPATIENT)
Age: 69
End: 2019-02-18

## 2019-02-18 VITALS
HEIGHT: 70 IN | OXYGEN SATURATION: 98 % | TEMPERATURE: 98 F | DIASTOLIC BLOOD PRESSURE: 79 MMHG | RESPIRATION RATE: 16 BRPM | SYSTOLIC BLOOD PRESSURE: 144 MMHG | WEIGHT: 233.03 LBS

## 2019-02-18 NOTE — PRE-ANESTHESIA EVALUATION ADULT - LAST ECHOCARDIOGRAM
' 2018 prior to Kidney Transplant ( [facility]); 2016 TTE: EF 55-60%, dilatation of Ao root

## 2019-02-18 NOTE — PRE-ANESTHESIA EVALUATION ADULT - NSANTHPMHFT_GEN_ALL_CORE
left renal ca, right renal benign neoplasm, b/l nephrectomy, hep c donor kidney, pt treated for hep c and negative;

## 2019-02-19 ENCOUNTER — APPOINTMENT (OUTPATIENT)
Dept: TRANSPLANT | Facility: HOSPITAL | Age: 69
End: 2019-02-19

## 2019-02-19 ENCOUNTER — OUTPATIENT (OUTPATIENT)
Dept: OUTPATIENT SERVICES | Facility: HOSPITAL | Age: 69
LOS: 1 days | End: 2019-02-19
Payer: MEDICARE

## 2019-02-19 VITALS
RESPIRATION RATE: 18 BRPM | SYSTOLIC BLOOD PRESSURE: 170 MMHG | DIASTOLIC BLOOD PRESSURE: 61 MMHG | OXYGEN SATURATION: 100 % | TEMPERATURE: 98 F | HEART RATE: 77 BPM

## 2019-02-19 DIAGNOSIS — Z90.5 ACQUIRED ABSENCE OF KIDNEY: Chronic | ICD-10-CM

## 2019-02-19 DIAGNOSIS — Z01.818 ENCOUNTER FOR OTHER PREPROCEDURAL EXAMINATION: ICD-10-CM

## 2019-02-19 DIAGNOSIS — I77.0 ARTERIOVENOUS FISTULA, ACQUIRED: Chronic | ICD-10-CM

## 2019-02-19 DIAGNOSIS — Z98.890 OTHER SPECIFIED POSTPROCEDURAL STATES: Chronic | ICD-10-CM

## 2019-02-19 DIAGNOSIS — Z94.0 KIDNEY TRANSPLANT STATUS: Chronic | ICD-10-CM

## 2019-02-19 DIAGNOSIS — Z94.0 KIDNEY TRANSPLANT STATUS: ICD-10-CM

## 2019-02-19 LAB
GLUCOSE BLDC GLUCOMTR-MCNC: 121 MG/DL — HIGH (ref 70–99)
GLUCOSE BLDC GLUCOMTR-MCNC: 87 MG/DL — SIGNIFICANT CHANGE UP (ref 70–99)

## 2019-02-19 PROCEDURE — 52310 CYSTOSCOPY AND TREATMENT: CPT

## 2019-02-19 PROCEDURE — C1769: CPT

## 2019-02-19 PROCEDURE — 82962 GLUCOSE BLOOD TEST: CPT

## 2019-02-19 RX ORDER — SODIUM CHLORIDE 9 MG/ML
3 INJECTION INTRAMUSCULAR; INTRAVENOUS; SUBCUTANEOUS EVERY 8 HOURS
Qty: 0 | Refills: 0 | Status: DISCONTINUED | OUTPATIENT
Start: 2019-02-19 | End: 2019-02-19

## 2019-02-19 RX ORDER — CEFAZOLIN SODIUM 1 G
2000 VIAL (EA) INJECTION ONCE
Qty: 0 | Refills: 0 | Status: DISCONTINUED | OUTPATIENT
Start: 2019-02-19 | End: 2019-03-06

## 2019-02-19 NOTE — ASU DISCHARGE PLAN (ADULT/PEDIATRIC). - MEDICATION SUMMARY - MEDICATIONS TO TAKE
I will START or STAY ON the medications listed below when I get home from the hospital:    Prograf    3.5 milligrams  -- 1 dose(s) by mouth 2 times a day  -- Indication: For History of kidney transplant    predniSONE 5 mg oral tablet  -- 1 tab(s) by mouth once a day  -- Indication: For History of kidney transplant    acetaminophen 325 mg oral tablet  -- 2 tab(s) by mouth every 6 hours, As needed, Mild Pain (1 - 3)  -- Indication: For prn pain    tamsulosin 0.4 mg oral capsule  -- 1 cap(s) by mouth once a day (at bedtime)  -- Indication: For BPH    gabapentin 300 mg oral capsule  -- 1 cap(s) by mouth once a day  -- Indication: For Neuropathy    Januvia 25 mg oral tablet  -- 1 tab(s) by mouth once a day.  * last dose preop is a.m. dose 2-18-19  -- Indication: For DM    glimepiride 2 mg oral tablet  -- 1 tab(s) by mouth once a day. * last preop dose is a.m. dose 2-18-19   -- Indication: For DM    Lomotil 2.5 mg-0.025 mg oral tablet  -- 1 tab(s) by mouth 2 times a day, As Needed  -- Indication: For Prn    nystatin 100,000 units/mL oral suspension  -- 5 milliliter(s) by mouth 4 times a day  -- Indication: For ppx    allopurinol 100 mg oral tablet  -- 1 tab(s) by mouth once a day  -- Indication: For gout    famotidine 20 mg oral tablet  -- 1 tab(s) by mouth once a day  -- Indication: For dyspepsia    CellCept 500 mg oral tablet  -- 1 tab(s) by mouth 2 times a day  -- Indication: For History of kidney transplant    sodium citrate-citric acid 500 mg-334 mg/5 mL oral solution  -- 15 milliliter(s) by mouth 3 times a day  -- Indication: For Home vitamins

## 2019-02-25 ENCOUNTER — LABORATORY RESULT (OUTPATIENT)
Age: 69
End: 2019-02-25

## 2019-02-25 ENCOUNTER — APPOINTMENT (OUTPATIENT)
Dept: TRANSPLANT | Facility: CLINIC | Age: 69
End: 2019-02-25

## 2019-02-25 PROBLEM — E11.9 TYPE 2 DIABETES MELLITUS WITHOUT COMPLICATIONS: Chronic | Status: ACTIVE | Noted: 2019-02-12

## 2019-02-25 PROBLEM — S83.249A OTHER TEAR OF MEDIAL MENISCUS, CURRENT INJURY, UNSPECIFIED KNEE, INITIAL ENCOUNTER: Chronic | Status: ACTIVE | Noted: 2019-02-12

## 2019-02-25 PROBLEM — K60.2 ANAL FISSURE, UNSPECIFIED: Chronic | Status: ACTIVE | Noted: 2019-02-12

## 2019-02-25 PROBLEM — N40.0 BENIGN PROSTATIC HYPERPLASIA WITHOUT LOWER URINARY TRACT SYMPTOMS: Chronic | Status: ACTIVE | Noted: 2019-02-12

## 2019-02-26 LAB
ALBUMIN SERPL ELPH-MCNC: 4 G/DL
ALP BLD-CCNC: 98 U/L
ALT SERPL-CCNC: 16 U/L
ANION GAP SERPL CALC-SCNC: 12 MMOL/L
APPEARANCE: ABNORMAL
APPEARANCE: CLEAR
AST SERPL-CCNC: 19 U/L
BACTERIA: ABNORMAL
BACTERIA: NEGATIVE
BILIRUB SERPL-MCNC: 0.2 MG/DL
BILIRUBIN URINE: NEGATIVE
BILIRUBIN URINE: NEGATIVE
BKV DNA SPEC QL NAA+PROBE: NOT DETECTED COPIES/ML
BLOOD URINE: ABNORMAL
BLOOD URINE: ABNORMAL
BUN SERPL-MCNC: 26 MG/DL
CALCIUM SERPL-MCNC: 9.7 MG/DL
CHLORIDE SERPL-SCNC: 106 MMOL/L
CMV DNA SPEC QL NAA+PROBE: NOT DETECTED
CMV DNA SPEC QL NAA+PROBE: NOT DETECTED IU/ML
CMVPCR LOG: NOT DETECTED LOGIU/ML
CMVPCR LOG: NOT DETECTED LOGIU/ML
CO2 SERPL-SCNC: 24 MMOL/L
COLOR: YELLOW
COLOR: YELLOW
CREAT SERPL-MCNC: 2.09 MG/DL
CREAT SPEC-SCNC: 155 MG/DL
CREAT SPEC-SCNC: 203 MG/DL
CREAT/PROT UR: 0.3 RATIO
CREAT/PROT UR: 0.3 RATIO
GLUCOSE QUALITATIVE U: NEGATIVE
GLUCOSE QUALITATIVE U: NEGATIVE MG/DL
GLUCOSE SERPL-MCNC: 166 MG/DL
HBV CORE IGG+IGM SER QL: NONREACTIVE
HBV DNA # SERPL NAA+PROBE: NOT DETECTED
HBV SURFACE AG SER QL: NONREACTIVE
HCV RNA SERPL NAA DL=5-ACNC: NOT DETECTED
HCV RNA SERPL NAA+PROBE-LOG IU: NOT DETECTED LOGIU/ML
HEPB DNA PCR LOG: NOT DETECTED LOGIU/ML
HIV1+2 AB SPEC QL IA.RAPID: NONREACTIVE
HYALINE CASTS: 0 /LPF
HYALINE CASTS: 2 /LPF
KETONES URINE: NEGATIVE
KETONES URINE: NEGATIVE
LDH SERPL-CCNC: 215 U/L
LDH SERPL-CCNC: 221 U/L
LEUKOCYTE ESTERASE URINE: ABNORMAL
LEUKOCYTE ESTERASE URINE: ABNORMAL
MAGNESIUM SERPL-MCNC: 1.5 MG/DL
MAGNESIUM SERPL-MCNC: 1.6 MG/DL
MICROSCOPIC-UA: NORMAL
MICROSCOPIC-UA: NORMAL
NITRITE URINE: NEGATIVE
NITRITE URINE: POSITIVE
PH URINE: 5
PH URINE: 5.5
PHOSPHATE SERPL-MCNC: 3.2 MG/DL
PHOSPHATE SERPL-MCNC: 3.4 MG/DL
POTASSIUM SERPL-SCNC: 4.3 MMOL/L
PROT SERPL-MCNC: 6.9 G/DL
PROT UR-MCNC: 43 MG/DL
PROT UR-MCNC: 65 MG/DL
PROTEIN URINE: 30 MG/DL
PROTEIN URINE: ABNORMAL
RED BLOOD CELLS URINE: 25 /HPF
RED BLOOD CELLS URINE: 5 /HPF
SODIUM SERPL-SCNC: 142 MMOL/L
SPECIFIC GRAVITY URINE: 1.02
SPECIFIC GRAVITY URINE: 1.02
SQUAMOUS EPITHELIAL CELLS: 0 /HPF
SQUAMOUS EPITHELIAL CELLS: 2 /HPF
TACROLIMUS SERPL-MCNC: 6.5 NG/ML
TACROLIMUS SERPL-MCNC: 9.8 NG/ML
UROBILINOGEN URINE: NEGATIVE MG/DL
UROBILINOGEN URINE: NORMAL
WHITE BLOOD CELLS URINE: 11 /HPF
WHITE BLOOD CELLS URINE: 354 /HPF

## 2019-02-27 ENCOUNTER — APPOINTMENT (OUTPATIENT)
Dept: TRANSPLANT | Facility: CLINIC | Age: 69
End: 2019-02-27
Payer: MEDICARE

## 2019-02-27 ENCOUNTER — INPATIENT (INPATIENT)
Facility: HOSPITAL | Age: 69
LOS: 5 days | Discharge: HOME | DRG: 698 | End: 2019-03-05
Attending: TRANSPLANT SURGERY | Admitting: TRANSPLANT SURGERY
Payer: MEDICARE

## 2019-02-27 VITALS
HEART RATE: 101 BPM | OXYGEN SATURATION: 98 % | SYSTOLIC BLOOD PRESSURE: 93 MMHG | DIASTOLIC BLOOD PRESSURE: 50 MMHG | RESPIRATION RATE: 18 BRPM | TEMPERATURE: 99 F | WEIGHT: 225.09 LBS | HEIGHT: 70 IN

## 2019-02-27 VITALS
SYSTOLIC BLOOD PRESSURE: 94 MMHG | RESPIRATION RATE: 19 BRPM | HEART RATE: 79 BPM | OXYGEN SATURATION: 96 % | DIASTOLIC BLOOD PRESSURE: 62 MMHG | TEMPERATURE: 100 F

## 2019-02-27 DIAGNOSIS — Z98.890 OTHER SPECIFIED POSTPROCEDURAL STATES: Chronic | ICD-10-CM

## 2019-02-27 DIAGNOSIS — Z94.0 KIDNEY TRANSPLANT STATUS: Chronic | ICD-10-CM

## 2019-02-27 DIAGNOSIS — R50.9 FEVER, UNSPECIFIED: ICD-10-CM

## 2019-02-27 DIAGNOSIS — Z90.5 ACQUIRED ABSENCE OF KIDNEY: Chronic | ICD-10-CM

## 2019-02-27 DIAGNOSIS — Z94.0 KIDNEY TRANSPLANT STATUS: ICD-10-CM

## 2019-02-27 DIAGNOSIS — N40.0 BENIGN PROSTATIC HYPERPLASIA WITHOUT LOWER URINARY TRACT SYMPTOMS: ICD-10-CM

## 2019-02-27 DIAGNOSIS — N17.9 ACUTE KIDNEY FAILURE, UNSPECIFIED: ICD-10-CM

## 2019-02-27 DIAGNOSIS — E11.9 TYPE 2 DIABETES MELLITUS WITHOUT COMPLICATIONS: ICD-10-CM

## 2019-02-27 DIAGNOSIS — D89.9 DISORDER INVOLVING THE IMMUNE MECHANISM, UNSPECIFIED: ICD-10-CM

## 2019-02-27 DIAGNOSIS — I77.0 ARTERIOVENOUS FISTULA, ACQUIRED: Chronic | ICD-10-CM

## 2019-02-27 LAB
ALBUMIN SERPL ELPH-MCNC: 3.5 G/DL — SIGNIFICANT CHANGE UP (ref 3.3–5)
ALP SERPL-CCNC: 95 U/L — SIGNIFICANT CHANGE UP (ref 40–120)
ALT FLD-CCNC: 56 U/L — HIGH (ref 10–45)
ANION GAP SERPL CALC-SCNC: 16 MMOL/L — SIGNIFICANT CHANGE UP (ref 5–17)
ANISOCYTOSIS BLD QL: SLIGHT — SIGNIFICANT CHANGE UP
APPEARANCE UR: ABNORMAL
APTT BLD: 39.7 SEC — HIGH (ref 27.5–36.3)
AST SERPL-CCNC: 52 U/L — HIGH (ref 10–40)
BASE EXCESS BLDV CALC-SCNC: -6.2 MMOL/L — LOW (ref -2–2)
BASOPHILS # BLD AUTO: 0 K/UL — SIGNIFICANT CHANGE UP (ref 0–0.2)
BASOPHILS NFR BLD AUTO: 0 % — SIGNIFICANT CHANGE UP (ref 0–2)
BILIRUB SERPL-MCNC: 0.9 MG/DL — SIGNIFICANT CHANGE UP (ref 0.2–1.2)
BILIRUB UR-MCNC: NEGATIVE — SIGNIFICANT CHANGE UP
BUN SERPL-MCNC: 57 MG/DL — HIGH (ref 7–23)
CA-I SERPL-SCNC: 1.21 MMOL/L — SIGNIFICANT CHANGE UP (ref 1.12–1.3)
CALCIUM SERPL-MCNC: 9.4 MG/DL — SIGNIFICANT CHANGE UP (ref 8.4–10.5)
CHLORIDE BLDV-SCNC: 108 MMOL/L — SIGNIFICANT CHANGE UP (ref 96–108)
CHLORIDE SERPL-SCNC: 101 MMOL/L — SIGNIFICANT CHANGE UP (ref 96–108)
CO2 BLDV-SCNC: 20 MMOL/L — LOW (ref 22–30)
CO2 SERPL-SCNC: 16 MMOL/L — LOW (ref 22–31)
COLOR SPEC: ABNORMAL
CREAT SERPL-MCNC: 5.88 MG/DL — HIGH (ref 0.5–1.3)
DACRYOCYTES BLD QL SMEAR: SLIGHT — SIGNIFICANT CHANGE UP
DIFF PNL FLD: ABNORMAL
DOHLE BOD BLD QL SMEAR: PRESENT — SIGNIFICANT CHANGE UP
EOSINOPHIL # BLD AUTO: 0.1 K/UL — SIGNIFICANT CHANGE UP (ref 0–0.5)
EOSINOPHIL NFR BLD AUTO: 0 % — SIGNIFICANT CHANGE UP (ref 0–6)
GAS PNL BLDV: 130 MMOL/L — LOW (ref 136–145)
GAS PNL BLDV: SIGNIFICANT CHANGE UP
GAS PNL BLDV: SIGNIFICANT CHANGE UP
GLUCOSE BLDV-MCNC: 91 MG/DL — SIGNIFICANT CHANGE UP (ref 70–99)
GLUCOSE SERPL-MCNC: 99 MG/DL — SIGNIFICANT CHANGE UP (ref 70–99)
GLUCOSE UR QL: NEGATIVE — SIGNIFICANT CHANGE UP
HCO3 BLDV-SCNC: 19 MMOL/L — LOW (ref 21–29)
HCT VFR BLD CALC: 29 % — LOW (ref 39–50)
HCT VFR BLDA CALC: 29 % — LOW (ref 39–50)
HGB BLD CALC-MCNC: 9.5 G/DL — LOW (ref 13–17)
HGB BLD-MCNC: 9.7 G/DL — LOW (ref 13–17)
HYPOCHROMIA BLD QL: SLIGHT — SIGNIFICANT CHANGE UP
INR BLD: 1 RATIO — SIGNIFICANT CHANGE UP (ref 0.88–1.16)
KETONES UR-MCNC: NEGATIVE — SIGNIFICANT CHANGE UP
LACTATE BLDV-MCNC: 1.5 MMOL/L — SIGNIFICANT CHANGE UP (ref 0.7–2)
LEUKOCYTE ESTERASE UR-ACNC: ABNORMAL
LYMPHOCYTES # BLD AUTO: 0.4 K/UL — LOW (ref 1–3.3)
LYMPHOCYTES # BLD AUTO: 6 % — LOW (ref 13–44)
LYMPHOCYTES # SPEC AUTO: 3 % — HIGH (ref 0–0)
MCHC RBC-ENTMCNC: 32 PG — SIGNIFICANT CHANGE UP (ref 27–34)
MCHC RBC-ENTMCNC: 33.4 GM/DL — SIGNIFICANT CHANGE UP (ref 32–36)
MCV RBC AUTO: 95.7 FL — SIGNIFICANT CHANGE UP (ref 80–100)
METAMYELOCYTES # FLD: 2 % — HIGH (ref 0–0)
MONOCYTES # BLD AUTO: 1.4 K/UL — HIGH (ref 0–0.9)
MONOCYTES NFR BLD AUTO: 7 % — SIGNIFICANT CHANGE UP (ref 2–14)
NEUTROPHILS # BLD AUTO: 15.4 K/UL — HIGH (ref 1.8–7.4)
NEUTROPHILS NFR BLD AUTO: 78 % — HIGH (ref 43–77)
NEUTS BAND # BLD: 3 % — SIGNIFICANT CHANGE UP (ref 0–8)
NITRITE UR-MCNC: NEGATIVE — SIGNIFICANT CHANGE UP
OVALOCYTES BLD QL SMEAR: SLIGHT — SIGNIFICANT CHANGE UP
PCO2 BLDV: 39 MMHG — SIGNIFICANT CHANGE UP (ref 35–50)
PH BLDV: 7.31 — LOW (ref 7.35–7.45)
PH UR: 6 — SIGNIFICANT CHANGE UP (ref 5–8)
PLAT MORPH BLD: NORMAL — SIGNIFICANT CHANGE UP
PLATELET # BLD AUTO: 125 K/UL — LOW (ref 150–400)
PO2 BLDV: 24 MMHG — LOW (ref 25–45)
POIKILOCYTOSIS BLD QL AUTO: SLIGHT — SIGNIFICANT CHANGE UP
POLYCHROMASIA BLD QL SMEAR: SLIGHT — SIGNIFICANT CHANGE UP
POTASSIUM BLDV-SCNC: 4.2 MMOL/L — SIGNIFICANT CHANGE UP (ref 3.5–5.3)
POTASSIUM SERPL-MCNC: 4.4 MMOL/L — SIGNIFICANT CHANGE UP (ref 3.5–5.3)
POTASSIUM SERPL-SCNC: 4.4 MMOL/L — SIGNIFICANT CHANGE UP (ref 3.5–5.3)
PROT SERPL-MCNC: 6.4 G/DL — SIGNIFICANT CHANGE UP (ref 6–8.3)
PROT UR-MCNC: ABNORMAL
PROTHROM AB SERPL-ACNC: 11.4 SEC — SIGNIFICANT CHANGE UP (ref 10–12.9)
RAPID RVP RESULT: SIGNIFICANT CHANGE UP
RBC # BLD: 3.03 M/UL — LOW (ref 4.2–5.8)
RBC # FLD: 13.8 % — SIGNIFICANT CHANGE UP (ref 10.3–14.5)
RBC BLD AUTO: ABNORMAL
ROULEAUX BLD QL SMEAR: PRESENT — SIGNIFICANT CHANGE UP
SAO2 % BLDV: 32 % — LOW (ref 67–88)
SODIUM SERPL-SCNC: 133 MMOL/L — LOW (ref 135–145)
SP GR SPEC: 1.02 — SIGNIFICANT CHANGE UP (ref 1.01–1.02)
TACROLIMUS SERPL-MCNC: 9 NG/ML — SIGNIFICANT CHANGE UP
TOXIC GRANULES BLD QL SMEAR: PRESENT — SIGNIFICANT CHANGE UP
UROBILINOGEN FLD QL: NEGATIVE — SIGNIFICANT CHANGE UP
VARIANT LYMPHS # BLD: 0 % — SIGNIFICANT CHANGE UP (ref 0–6)
WBC # BLD: 17.3 K/UL — HIGH (ref 3.8–10.5)
WBC # FLD AUTO: 17.3 K/UL — HIGH (ref 3.8–10.5)

## 2019-02-27 PROCEDURE — 85060 BLOOD SMEAR INTERPRETATION: CPT

## 2019-02-27 PROCEDURE — ZZZZZ: CPT

## 2019-02-27 PROCEDURE — 99222 1ST HOSP IP/OBS MODERATE 55: CPT | Mod: GC

## 2019-02-27 PROCEDURE — 71250 CT THORAX DX C-: CPT | Mod: 26

## 2019-02-27 PROCEDURE — 76937 US GUIDE VASCULAR ACCESS: CPT | Mod: 26

## 2019-02-27 PROCEDURE — XXXXX: CPT

## 2019-02-27 PROCEDURE — 99285 EMERGENCY DEPT VISIT HI MDM: CPT | Mod: 25

## 2019-02-27 PROCEDURE — 99222 1ST HOSP IP/OBS MODERATE 55: CPT | Mod: GC,24

## 2019-02-27 PROCEDURE — 76776 US EXAM K TRANSPL W/DOPPLER: CPT | Mod: 26,RT

## 2019-02-27 PROCEDURE — 93010 ELECTROCARDIOGRAM REPORT: CPT

## 2019-02-27 PROCEDURE — 74176 CT ABD & PELVIS W/O CONTRAST: CPT | Mod: 26

## 2019-02-27 RX ORDER — DEXTROSE 50 % IN WATER 50 %
25 SYRINGE (ML) INTRAVENOUS ONCE
Qty: 0 | Refills: 0 | Status: DISCONTINUED | OUTPATIENT
Start: 2019-02-27 | End: 2019-03-05

## 2019-02-27 RX ORDER — TAMSULOSIN HYDROCHLORIDE 0.4 MG/1
0.4 CAPSULE ORAL AT BEDTIME
Qty: 0 | Refills: 0 | Status: DISCONTINUED | OUTPATIENT
Start: 2019-02-27 | End: 2019-03-05

## 2019-02-27 RX ORDER — DEXTROSE 50 % IN WATER 50 %
12.5 SYRINGE (ML) INTRAVENOUS ONCE
Qty: 0 | Refills: 0 | Status: DISCONTINUED | OUTPATIENT
Start: 2019-02-27 | End: 2019-03-05

## 2019-02-27 RX ORDER — DOCUSATE SODIUM 100 MG
100 CAPSULE ORAL THREE TIMES A DAY
Qty: 0 | Refills: 0 | Status: DISCONTINUED | OUTPATIENT
Start: 2019-02-27 | End: 2019-02-28

## 2019-02-27 RX ORDER — DEXTROSE 50 % IN WATER 50 %
15 SYRINGE (ML) INTRAVENOUS ONCE
Qty: 0 | Refills: 0 | Status: DISCONTINUED | OUTPATIENT
Start: 2019-02-27 | End: 2019-03-05

## 2019-02-27 RX ORDER — PIPERACILLIN AND TAZOBACTAM 4; .5 G/20ML; G/20ML
3.38 INJECTION, POWDER, LYOPHILIZED, FOR SOLUTION INTRAVENOUS EVERY 12 HOURS
Qty: 0 | Refills: 0 | Status: DISCONTINUED | OUTPATIENT
Start: 2019-02-27 | End: 2019-02-28

## 2019-02-27 RX ORDER — GABAPENTIN 400 MG/1
300 CAPSULE ORAL DAILY
Qty: 0 | Refills: 0 | Status: DISCONTINUED | OUTPATIENT
Start: 2019-02-27 | End: 2019-03-05

## 2019-02-27 RX ORDER — SENNA PLUS 8.6 MG/1
2 TABLET ORAL AT BEDTIME
Qty: 0 | Refills: 0 | Status: DISCONTINUED | OUTPATIENT
Start: 2019-02-27 | End: 2019-02-28

## 2019-02-27 RX ORDER — SODIUM CHLORIDE 9 MG/ML
1000 INJECTION INTRAMUSCULAR; INTRAVENOUS; SUBCUTANEOUS ONCE
Qty: 0 | Refills: 0 | Status: COMPLETED | OUTPATIENT
Start: 2019-02-27 | End: 2019-02-27

## 2019-02-27 RX ORDER — GLUCAGON INJECTION, SOLUTION 0.5 MG/.1ML
1 INJECTION, SOLUTION SUBCUTANEOUS ONCE
Qty: 0 | Refills: 0 | Status: DISCONTINUED | OUTPATIENT
Start: 2019-02-27 | End: 2019-03-05

## 2019-02-27 RX ORDER — ACETAMINOPHEN 500 MG
650 TABLET ORAL EVERY 6 HOURS
Qty: 0 | Refills: 0 | Status: DISCONTINUED | OUTPATIENT
Start: 2019-02-27 | End: 2019-03-05

## 2019-02-27 RX ORDER — CEFEPIME 1 G/1
INJECTION, POWDER, FOR SOLUTION INTRAMUSCULAR; INTRAVENOUS
Qty: 0 | Refills: 0 | Status: DISCONTINUED | OUTPATIENT
Start: 2019-02-27 | End: 2019-02-27

## 2019-02-27 RX ORDER — SITAGLIPTIN 50 MG/1
1 TABLET, FILM COATED ORAL
Qty: 0 | Refills: 0 | COMMUNITY

## 2019-02-27 RX ORDER — SODIUM CHLORIDE 9 MG/ML
1000 INJECTION, SOLUTION INTRAVENOUS
Qty: 0 | Refills: 0 | Status: DISCONTINUED | OUTPATIENT
Start: 2019-02-27 | End: 2019-03-01

## 2019-02-27 RX ORDER — ATOVAQUONE 750 MG/5ML
750 SUSPENSION ORAL
Qty: 0 | Refills: 0 | Status: DISCONTINUED | OUTPATIENT
Start: 2019-02-27 | End: 2019-03-05

## 2019-02-27 RX ORDER — VANCOMYCIN HCL 1 G
1000 VIAL (EA) INTRAVENOUS ONCE
Qty: 0 | Refills: 0 | Status: COMPLETED | OUTPATIENT
Start: 2019-02-27 | End: 2019-02-27

## 2019-02-27 RX ORDER — SODIUM CHLORIDE 9 MG/ML
1000 INJECTION INTRAMUSCULAR; INTRAVENOUS; SUBCUTANEOUS
Qty: 0 | Refills: 0 | Status: DISCONTINUED | OUTPATIENT
Start: 2019-02-27 | End: 2019-03-02

## 2019-02-27 RX ORDER — INSULIN LISPRO 100/ML
VIAL (ML) SUBCUTANEOUS AT BEDTIME
Qty: 0 | Refills: 0 | Status: DISCONTINUED | OUTPATIENT
Start: 2019-02-27 | End: 2019-03-05

## 2019-02-27 RX ORDER — CEFEPIME 1 G/1
2000 INJECTION, POWDER, FOR SOLUTION INTRAMUSCULAR; INTRAVENOUS ONCE
Qty: 0 | Refills: 0 | Status: COMPLETED | OUTPATIENT
Start: 2019-02-27 | End: 2019-02-27

## 2019-02-27 RX ORDER — INSULIN LISPRO 100/ML
VIAL (ML) SUBCUTANEOUS
Qty: 0 | Refills: 0 | Status: DISCONTINUED | OUTPATIENT
Start: 2019-02-27 | End: 2019-03-05

## 2019-02-27 RX ORDER — FAMOTIDINE 10 MG/ML
20 INJECTION INTRAVENOUS DAILY
Qty: 0 | Refills: 0 | Status: DISCONTINUED | OUTPATIENT
Start: 2019-02-27 | End: 2019-03-05

## 2019-02-27 RX ADMIN — Medication 650 MILLIGRAM(S): at 23:00

## 2019-02-27 RX ADMIN — CEFEPIME 100 MILLIGRAM(S): 1 INJECTION, POWDER, FOR SOLUTION INTRAMUSCULAR; INTRAVENOUS at 18:26

## 2019-02-27 RX ADMIN — TAMSULOSIN HYDROCHLORIDE 0.4 MILLIGRAM(S): 0.4 CAPSULE ORAL at 22:05

## 2019-02-27 RX ADMIN — PIPERACILLIN AND TAZOBACTAM 25 GRAM(S): 4; .5 INJECTION, POWDER, LYOPHILIZED, FOR SOLUTION INTRAVENOUS at 19:47

## 2019-02-27 RX ADMIN — SODIUM CHLORIDE 4000 MILLILITER(S): 9 INJECTION INTRAMUSCULAR; INTRAVENOUS; SUBCUTANEOUS at 15:14

## 2019-02-27 RX ADMIN — ATOVAQUONE 750 MILLIGRAM(S): 750 SUSPENSION ORAL at 22:05

## 2019-02-27 RX ADMIN — Medication 250 MILLIGRAM(S): at 19:43

## 2019-02-27 RX ADMIN — Medication 100 MILLIGRAM(S): at 22:05

## 2019-02-27 RX ADMIN — Medication 650 MILLIGRAM(S): at 22:05

## 2019-02-27 NOTE — ED PROVIDER NOTE - PSH
AV fistula  2013/ left forearm  H/O ileostomy  '    for 3 months. s/p Reversal  Kidney transplant recipient  2018  @ [facility] :  +  Hep C Donor  S/p nephrectomy  right 12/10/2015   benign  S/p nephrectomy  left 9/15/2015   + Cancer  S/P right knee arthroscopy

## 2019-02-27 NOTE — H&P ADULT - HISTORY OF PRESENT ILLNESS
68-year-old male with a past medical history of ESRD secondary to bilateral nephrectomy from neoplasm, hypertension, and NIDDM status post HCV + DDRT (7/17) who's course was complicated by delayed graft function requiring HD, ATN/mild rejection (8/2017), renal artery stenosis requiring stenting, and perinephric collection requiring drainage was seen on 2/19/19 in [facility] for removal of his ureteral stent; now presenting to the ED from outpatient office due to weakness and hypotension. Reports a four day history of generalized weakness and lethargy, anorexia with decreased PO intake, and generalized malaise. No fevers/chills, or sick contacts. Endorses cough, occasional with scant amount of white/grey sputum. Moderate amount of rhinorrhea without ear/nose/throat pain. Additionally complaining of diarrhea without blood/black discoloration. Previous outpatient transplanted renal ultrasound (1/22/19) found elevated velocities with scattered tardus parvus waveforms.  In ED, noted to be hypotension, 93/50, ordered for labs/urine, blood/urine culture, 0.9% NS bolus, CTs of chest/abdomen/pelvis, and a transplant renal ultrasound. 68-year-old male with a past medical history of ESRD secondary to bilateral nephrectomy from neoplasm, hypertension, and NIDDM status post HCV + DDRT (7/18) who's course was complicated by delayed graft function requiring HD, ATN/mild rejection (8/2017), renal artery stenosis requiring stenting, and perinephric collection requiring drainage was seen on 2/19/19 in [facility] for removal of his ureteral stent; now presenting to the ED from outpatient office due to weakness and hypotension. Reports a four day history of generalized weakness and lethargy, anorexia with decreased PO intake, and generalized malaise. No fevers/chills, or sick contacts. Endorses cough, occasional with scant amount of white/grey sputum. Moderate amount of rhinorrhea without ear/nose/throat pain. Additionally complaining of diarrhea without blood/black discoloration. Previous outpatient transplanted renal ultrasound (1/22/19) found elevated velocities with scattered tardus parvus waveforms.  In ED, noted to be hypotension, 93/50, ordered for labs/urine, blood/urine culture, 0.9% NS bolus, CTs of chest/abdomen/pelvis, and a transplant renal ultrasound.

## 2019-02-27 NOTE — H&P ADULT - PSH
AV fistula  2013/ left forearm  H/O ileostomy  '    for 3 months. s/p Reversal  Kidney transplant recipient  2018  @ [facility] :  +  Hep C Donor  S/p nephrectomy  right 12/10/2015   benign  S/p nephrectomy  left 9/15/2015   + Cancer  S/P right knee arthroscopy

## 2019-02-27 NOTE — ED ADULT NURSE NOTE - PSH
AV fistula  2013/ left forearm  H/O ileostomy  '    for 3 months. s/p Reversal  Kidney transplant recipient  2018  @ [facility] :  +  Hep C Donor  S/p nephrectomy  right 12/10/2015   benign  S/p nephrectomy  left 9/15/2015   + Cancer  S/P right knee arthroscopy

## 2019-02-27 NOTE — ED PROVIDER NOTE - ATTENDING CONTRIBUTION TO CARE
67 y/o m with pmhx kidney transplant 7/7/17 Dr. Castaneda presents for eval of fever. patient denies any pain. no vomiting no abd pain no diarrhea. was seen earlier at office and sent to ED for further eval.  patient had weakness and fatigue for the last week and also now with low blood pressure. BP 93/50 Pt has taken nifedipine 30mg Am  Gen.  no acute distress  HEENT:  perrl eomi   Lungs:  b/l bs no rhonchi  CVS: S1S2   Abd;  soft non tender   Ext:trace pitting edema of b/l lower ext, avg on left arm with palp thrill and bruit.   Neuro: aaox3 no focal deficits  MSK: strength 5/5 b/l upper and lower

## 2019-02-27 NOTE — ED ADULT NURSE REASSESSMENT NOTE - NS ED NURSE REASSESS COMMENT FT1
When patient returned from ultrasound, he is c/o chills and pain at IV insertion site. IV fluids paused. Pt taken to CT scan. While at CT scan patient assigned room on [address] [place] [address], I gave report to 6 Surjit RN, in report I spoke about possible IV infiltration and new c/o chills, will attempt new IV placement when patient returns from CT scan prior to transfer to floor and medicate for fever if necessary

## 2019-02-27 NOTE — ED PROVIDER NOTE - OBJECTIVE STATEMENT
68 y.o. male s/p renal transplant this prior summer on cellcept and prograf coming in after he followed up with his surgical team regarding about 3 days of not feeling well.  Some mild runny nose, sore throat and occasional cough.  Cough is non productive.  No chest pain, no abd pain nausea vomiting or diarrhea.  Was found to have a low grade fever as an out patient was sent in to be admitted to the hospital.  Here not feeling well but wihtout any focal pain.  Nothing makes his haley nose

## 2019-02-27 NOTE — H&P ADULT - NSHPPHYSICALEXAM_GEN_ALL_CORE
PHYSICAL EXAM:  Constitutional: Well developed/well nourished  Eyes: Anicteric, PERRLA  ENMT: Normocephalic and atraumatic. Normal ROM. Throat pink without redness/white patches.   Neck: no JVD, no lymphadenopathy.   Respiratory: Lung clear to ascultation bilaterally and across all fields.   Cardiovascular: S1S2. Regular rate and rhythm.   Gastrointestinal: Soft abdomen, surgical scars appreciated, nondistended and nontender.   Genitourinary: Voiding spontaneously.   Extremities: SCD's in place and working bilaterally  Vascular: Palpable PT/DP pulses in bilateral legs. Palpable thrill in left arm AVF.   Neurological: A&O x4. No focal or lateralizing deficits. Generalized weakness noted.   Skin: No redness, rashes, or wounds noted.   Musculoskeletal: Moving all extremities, FROM, no pain/tenderness.   Psychiatric: Responsive and appropriate to situation.

## 2019-02-27 NOTE — REASON FOR VISIT
[de-identified] : patient had jj stent removed last week. today not feeling well. feels very week. febrile; 100F today.\par  says good urine output, no burning. aslo has respiratoy symptoms; coughing with sputum and runny nose.\par will send patient to the ER \par will get renal US and labs\par may need admission

## 2019-02-27 NOTE — H&P ADULT - PROBLEM SELECTOR PLAN 1
Hypotensive  One liter fluid bolus in ED  CT c/abd/p  RVP, UA, blood cultures x2  Send CMV PCR  Cefepime and vancomycn started in ED Hypotensive  One liter fluid bolus in ED  Start IVF @100ml/hr  CT c/abd/p  RVP, UA, blood cultures x2  Send CMV PCR  Cefepime and vancomycn started in ED

## 2019-02-27 NOTE — H&P ADULT - NSHPLABSRESULTS_GEN_ALL_CORE
LABS:                        9.7    17.3  )-----------( 125      ( 27 Feb 2019 15:14 )             29.0     02-27    133<L>  |  101  |  57<H>  ----------------------------<  99  4.4   |  16<L>  |  5.88<H>    Ca    9.4      27 Feb 2019 15:14    TPro  6.4  /  Alb  3.5  /  TBili  0.9  /  DBili  x   /  AST  52<H>  /  ALT  56<H>  /  AlkPhos  95  02-27

## 2019-02-27 NOTE — H&P ADULT - NSHPREVIEWOFSYSTEMS_GEN_ALL_CORE
Review of systems  General: Global and generalized weakness with lethargy. No weight changes, fevers, or chills. No reported sick contacts.   Skin: No rashes  Head/Eyes/Ears/Mouth: Normal hearing and vision without blurriness. No headaches, sinus pain/discomfort, or sore throat.  Respiratory: Occasional productive cough with grey/white sputum. No dyspnea, wheezing, or hemoptysis.  CV: No chest pain, palpitations, PND, orthopnea  GI: Reports diarrhea without BRBPR/melena. + Anorexia. No constipation, nausea, vomiting, or abdominal pain.  : No increased frequency, dysuria, hematuria, or nocturia.  MSK: No joint pain/swelling, back pain, or edema.  Neuro: No dizziness/lightheadedness, seizures, numbness, or tingling.  Hematologic: No easy bruising or bleeding.  Endocrine: No heat/cold intolerance.  Psych: No significant nervousness, anxiety, stress, depression  All other systems were reviewed and are negative, except as noted.

## 2019-02-27 NOTE — ED PROVIDER NOTE - CLINICAL SUMMARY MEDICAL DECISION MAKING FREE TEXT BOX
ATTG: : fever with immunocompromised. will check labs, pan cultures, check ct head, chest / abd  and pelvis. check urine, ivf and reeval for dispo

## 2019-02-27 NOTE — CHART NOTE - NSCHARTNOTEFT_GEN_A_CORE
Patient seen and examined. Out patient chart and f/u notes reviewed.  Patient is admitted with acute diarrheal illness, gen weakness. no vomiting.  Recent removal of ureteral stent.  Reviewed immunosuppression and allograft function.  reviewed sonogram images with radiologist- No hydro, RI upto 0.9, urothelial mucosal thickening  Imp:  Renal allograft recipient  Acute diarrheal illness- Likely infectious etiology  Possible UTI  CHELA    Plan:  IV hydration  Hold MMF  Cont Tac based on level/prednisone  Stool for ova and P, C diff, micro/cryptosporidium, crypto antien, CMV PCR, RVP panel  Blood and urine culture  Agree with antibiotics-on Zosyn at present, also received Vanc/cefepime in ED  Plan of care discussed with transplant team, surgeon, house staff  Will follow  Allan Multani MD  [phone]

## 2019-02-27 NOTE — CONSULT NOTE ADULT - PROBLEM SELECTOR RECOMMENDATION 9
likely pre-renal in setting of hypotension with likely underlying infection.   - IVF hydration  - UA, Ulytes  - BCx, CXR, UCx, Stool Cx, C.diff, flu swab, RVP panel  - Check CMV  - Monitor BMP  - Monitor ins/outs  - Hold nifedipine  - Broad spectrum Abx- dose renally.

## 2019-02-27 NOTE — ED PROVIDER NOTE - PMH
Anal fissure  dx: ' 2018   No surgery  Benign prostatic hypertrophy    Bowel obstruction  ' 2017   surgically treated  ESRD (end stage renal disease)  On Dialysis ' 2015 to 7/2018  Hepatitis C  In Donor Kidney: patient received treatment for Hep. C : post treatment: patient  tested Negative for Hep C  HTN (hypertension)    Kidney neoplasm  dx: ' 2015 : Left      s/p bilateral Nephrectomy ' 2015  Medial meniscus tear  ' 90's  Right  Type 2 diabetes mellitus  dx: '88

## 2019-02-27 NOTE — CONSULT NOTE ADULT - SUBJECTIVE AND OBJECTIVE BOX
[facility] DIVISION OF KIDNEY DISEASES AND HYPERTENSION -- INITIAL CONSULT NOTE  --------------------------------------------------------------------------------  HPI:  Pt is a 68yoM w/ Hx of b/l nephrectomies (malignancy) and ESRD on HD () now s/p Hep C+ DDRT, DM, HTN, oxalaturia, here for weakness, cough, diarrhea, poor PO intake x3 days. Found to be hypotensive to 93/50 in the ER.     Pt had HepC DDRT on 18. Donor was 40yoM. CIT of 23 hours. Simulect induction. Had DGF. Last HD around 2018.  Had ATN, borderline rejection, oxalate crystals on renal biopsy. Was treated with steroids.   Also had KEREN requiring stenting.   On 19 had ureteral stent removed.  His MMF was decreased due to leukopenia.  Had Hep C 3a genotype, and completed treatment with Epclusa.    PAST HISTORY  --------------------------------------------------------------------------------  PAST MEDICAL & SURGICAL HISTORY:  Medial meniscus tear: &#x27; 90&#x27;s  Right  Bowel obstruction: &#x27; 2017   surgically treated  Anal fissure: dx: &#x27; 2018   No surgery  Benign prostatic hypertrophy  Hepatitis C: In Donor Kidney: patient received treatment for Hep. C : post treatment: patient  tested Negative for Hep C  Kidney neoplasm: dx: &#x27;  : Left      s/p bilateral Nephrectomy &#x27; 2015  ESRD (end stage renal disease): On Dialysis &#x27; 2015 to 2018  Type 2 diabetes mellitus: dx: &#x27;88  HTN (hypertension)  Kidney transplant recipient: 2018  @ [facility] :  +  Hep C Donor  S/P right knee arthroscopy: &#x27; 90&#x27;s  H/O ileostomy: &#x27; 2017   for 3 months. 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    FAMILY HISTORY:  No pertinent family history in first degree relatives    PAST SOCIAL HISTORY:    ALLERGIES & MEDICATIONS  --------------------------------------------------------------------------------  Allergies    No Known Allergies    Intolerances      Standing Inpatient Medications  atovaquone Suspension 750 milliGRAM(s) Oral two times a day  dextrose 5%. 1000 milliLiter(s) IV Continuous <Continuous>  dextrose 50% Injectable 12.5 Gram(s) IV Push once  dextrose 50% Injectable 25 Gram(s) IV Push once  dextrose 50% Injectable 25 Gram(s) IV Push once  docusate sodium 100 milliGRAM(s) Oral three times a day  famotidine    Tablet 20 milliGRAM(s) Oral daily  gabapentin 300 milliGRAM(s) Oral daily  insulin lispro (HumaLOG) corrective regimen sliding scale   SubCutaneous three times a day before meals  insulin lispro (HumaLOG) corrective regimen sliding scale   SubCutaneous at bedtime  piperacillin/tazobactam IVPB. 3.375 Gram(s) IV Intermittent every 12 hours  predniSONE   Tablet 5 milliGRAM(s) Oral daily  sodium chloride 0.9%. 1000 milliLiter(s) IV Continuous <Continuous>  tamsulosin 0.4 milliGRAM(s) Oral at bedtime    PRN Inpatient Medications  acetaminophen   Tablet .. 650 milliGRAM(s) Oral every 6 hours PRN  dextrose 40% Gel 15 Gram(s) Oral once PRN  glucagon  Injectable 1 milliGRAM(s) IntraMuscular once PRN  senna 2 Tablet(s) Oral at bedtime PRN      REVIEW OF SYSTEMS  --------------------------------------------------------------------------------  Gen: No weight changes, +fatigue, +fevers/chills, +weakness  Skin: No rashes  Head/Eyes/Ears/Mouth: No headache; Normal hearing  Respiratory: No dyspnea, +cough  CV: No chest pain  GI: No abdominal pain, +diarrhea  : No increased frequency, dysuria  MSK: No joint pain/swelling; no back pain; no edema  Neuro: +dizziness  Heme: No easy bruising or bleeding  Endo: No heat/cold intolerance  Psych: No significant nervousness    All other systems were reviewed and are negative, except as noted.    VITALS/PHYSICAL EXAM  --------------------------------------------------------------------------------  T(C): 37.2 (19 @ 19:04), Max: 37.7 (19 @ 15:27)  HR: 86 (19 @ 19:04) (86 - 107)  BP: 124/79 (19 @ 19:04) (93/50 - 144/67)  RR: 18 (19 @ 19:04) (17 - 18)  SpO2: 98% (19 @ 19:04) (97% - 98%)  Wt(kg): --  Height (cm): 177.8 (19 @ 12:49)  Weight (kg): 102.1 (19 @ 12:49)  BMI (kg/m2): 32.3 (19 @ 12:49)  BSA (m2): 2.19 (19 @ 12:49)      Physical Exam:  	Gen: NAD  	HEENT: anicteric  	Pulm: CTA B/L  	CV: RRR, S1S2; no rub  	Back: No spinal or CVA tenderness; no sacral edema  	Abd: +BS, soft, nontender/nondistended  	: No suprapubic tenderness  	UE: Warm, no edema   	LE: Warm, no edema  	Neuro: follows commands  	Psych: Normal affect and mood  	Skin: Warm, without rashes    LABS/STUDIES  --------------------------------------------------------------------------------              9.7    17.3  >-----------<  125      [19 15:14]              29.0     133  |  101  |  57  ----------------------------<  99      [19 15:14]  4.4   |  16  |  5.88        Ca     9.4     [19 15:14]    TPro  6.4  /  Alb  3.5  /  TBili  0.9  /  DBili  x   /  AST  52  /  ALT  56  /  AlkPhos  95  [19 15:14]    PT/INR: PT 11.4 , INR 1.00       [19 15:14]  PTT: 39.7       [19 15:14]      Creatinine Trend:  SCr 5.88 [ 15:14]    Urinalysis - [19 18:18]      Color Orange / Appearance Turbid / SG 1.017 / pH 6.0      Gluc Negative / Ketone Negative  / Bili Negative / Urobili Negative       Blood Moderate / Protein 300 mg/dL / Leuk Est Large / Nitrite Negative      RBC  / WBC  / Hyaline  / Gran  / Sq Epi  / Non Sq Epi  / Bacteria       HbA1c 6.0      [18 @ 16:53]    HBsAb 5.6      [18 @ 19:20]  HBsAg Nonreact      [08-15-18 @ 14:37]  HBcAb Nonreact      [18 19:20]  HCV 0.10, Nonreact      [08-15-18 @ 14:37]  HIV Nonreact      [08-15-18 @ 14:37]

## 2019-02-27 NOTE — CONSULT NOTE ADULT - ASSESSMENT
68yoM w/ Hx of b/l nephrectomies (malignancy) and ESRD on HD (2015) now s/p Hep C+ DDRT (s/p Epclusa), DM, HTN, oxalaturia, BPH, here for weakness, cough, diarrhea, poor PO intake x3 days. Found to be hypotensive to 93/50 in the ER.

## 2019-02-27 NOTE — ED PROCEDURE NOTE - PROCEDURE ADDITIONAL DETAILS
Peripheral IV access in the Emergency Department obtained under dynamic ultrasound guidance with dark nonpulsatile blood return.  Catheter was flushed afterwards without any resistance or resulting extravasation.  IV catheter confirmed in compressible vein after insertion. 18 g catheter placed in a peripheral vein in the right upper extremity.
Emergency Department Focused Ultrasound performed at patient's bedside for educational purposes. The study will have a follow up study performed or was performed in the direct supervision of an ultrasound trained attending.

## 2019-02-27 NOTE — CONSULT NOTE ADULT - ATTENDING COMMENTS
Patient is admitted with UTI, diarrhea, CHELA  See chart note 2/27/19  Reviewed plan of care with transplant etam  Reviewed allograft function and immunosuppression  I was present during and reviewed clinical and lab data as well as assessment and plan as documented by the housestaff as noted. Please contact if any additional questions with any change in clinical condition or on availability of any additional information or reports.

## 2019-02-27 NOTE — ED ADULT NURSE REASSESSMENT NOTE - NS ED NURSE REASSESS COMMENT FT1
When patient came back from CT scan, attempted to flush IV again, patient now reporting no pain with flushing IV, so IV fluids infusing, will monitor for increased pain or swelling

## 2019-02-27 NOTE — H&P ADULT - ASSESSMENT
68-year-old male with a past medical history of ESRD secondary to bilateral nephrectomy from neoplasm, hypertension, and NIDDM status post HCV + DDRT (7/17), course complicated by DGF, mild rejection/ATN, and KEREN requiring stenting (removed 2/14/19); now presenting to the ED from outpatient office due to weakness and hypotension.

## 2019-02-27 NOTE — ED PROVIDER NOTE - CARE PLAN
Principal Discharge DX:	Fever, unspecified fever cause  Secondary Diagnosis:	Renal transplant recipient

## 2019-02-27 NOTE — CONSULT NOTE ADULT - PROBLEM SELECTOR RECOMMENDATION 3
On tac 4.5/4.5, MMF 500mg BID, prednisone, atovaquone.   - Hold MMF in setting of infection/diarrhea.  - C/w Tac.  - Monitor tac troughs daily.   - C/w prednisone 5mg/daily and atovaquone for ppx.

## 2019-02-27 NOTE — H&P ADULT - ATTENDING COMMENTS
seen and assessed on 2/27    adm with weakness, fevers sp stent removal     suspect UTI. on abx.    awaiting cx.    immuno: pred (tac/MMF held)

## 2019-02-27 NOTE — H&P ADULT - PROBLEM SELECTOR PLAN 3
On Tacrolimus 4.5 BID,  BID, and Prednisone 5 mg daily. Atovaquone for prophylaxis.   - Obtain Tacrolimus level.   - Pending source/confirmation of infectious etiology to patient's symptoms, consider adjusting immunosuppression. Hold tacrolimus and MMF in the setting of R/O sepsis  Cintinue Prednisone 5 mg daily. Atovaquone for prophylaxis.   - Obtain Tacrolimus level.   Continue with mepron

## 2019-02-27 NOTE — H&P ADULT - PROBLEM SELECTOR PLAN 2
Worsening CHELA.   Transplanted renal ultrasound on 1/22 found elevated velocities with scattered tardus parvus waveforms. Ureteral stent removed 2/14. Additionally had previous perinephric collection.   - Outpatient BUN/Creatinine (2/25) of 46/3.64. Now 57/5.88 in ER.   - Hold nephrotoxic medications.   - Repeat renal ultrasound.   - CBC and BMP in the am.   - Trend BUN/Creatinine. Worsening CHELA.   Ureteral stent removed 2/14. Additionally had previous perinephric collection.   - Outpatient BUN/Creatinine (2/25) of 46/3.64. Now 57/5.88 in ER.   - Hold nephrotoxic medications.   - Stat renal ultrasound.   - CBC and BMP in the am.   - Trend BUN/Creatinine.

## 2019-02-27 NOTE — ED ADULT NURSE NOTE - OBJECTIVE STATEMENT
67 yo male with PMH renal transplant (7/2018), s/p ureteral stent removal one week ago, presents to ED with fatigue, weakness, and decreased PO intake. He reports occasional cough productive of white/gray sputum as well as 4 days of diarrhea. No chest pain, shortness of breath, abdominal pain. No bloody stool. No dysuria

## 2019-02-27 NOTE — ED ADULT NURSE NOTE - NSIMPLEMENTINTERV_GEN_ALL_ED
Implemented All Fall Risk Interventions:  Leslie to call system. Call bell, personal items and telephone within reach. Instruct patient to call for assistance. Room bathroom lighting operational. Non-slip footwear when patient is off stretcher. Physically safe environment: no spills, clutter or unnecessary equipment. Stretcher in lowest position, wheels locked, appropriate side rails in place. Provide visual cue, wrist band, yellow gown, etc. Monitor gait and stability. Monitor for mental status changes and reorient to person, place, and time. Review medications for side effects contributing to fall risk. Reinforce activity limits and safety measures with patient and family.

## 2019-02-28 DIAGNOSIS — E83.42 HYPOMAGNESEMIA: ICD-10-CM

## 2019-02-28 DIAGNOSIS — A41.50 GRAM-NEGATIVE SEPSIS, UNSPECIFIED: ICD-10-CM

## 2019-02-28 LAB
ALBUMIN SERPL ELPH-MCNC: 3.1 G/DL — LOW (ref 3.3–5)
ALP SERPL-CCNC: 86 U/L — SIGNIFICANT CHANGE UP (ref 40–120)
ALT FLD-CCNC: 44 U/L — SIGNIFICANT CHANGE UP (ref 10–45)
ANION GAP SERPL CALC-SCNC: 16 MMOL/L — SIGNIFICANT CHANGE UP (ref 5–17)
AST SERPL-CCNC: 38 U/L — SIGNIFICANT CHANGE UP (ref 10–40)
BILIRUB SERPL-MCNC: 1.1 MG/DL — SIGNIFICANT CHANGE UP (ref 0.2–1.2)
BUN SERPL-MCNC: 65 MG/DL — HIGH (ref 7–23)
CALCIUM SERPL-MCNC: 8.9 MG/DL — SIGNIFICANT CHANGE UP (ref 8.4–10.5)
CHLORIDE SERPL-SCNC: 101 MMOL/L — SIGNIFICANT CHANGE UP (ref 96–108)
CO2 SERPL-SCNC: 14 MMOL/L — LOW (ref 22–31)
CREAT SERPL-MCNC: 6.28 MG/DL — HIGH (ref 0.5–1.3)
E COLI DNA BLD POS QL NAA+NON-PROBE: SIGNIFICANT CHANGE UP
GLUCOSE SERPL-MCNC: 101 MG/DL — HIGH (ref 70–99)
GRAM STN FLD: SIGNIFICANT CHANGE UP
HBA1C BLD-MCNC: 5.8 % — HIGH (ref 4–5.6)
HCT VFR BLD CALC: 28 % — LOW (ref 39–50)
HGB BLD-MCNC: 9.7 G/DL — LOW (ref 13–17)
MAGNESIUM SERPL-MCNC: 1.5 MG/DL — LOW (ref 1.6–2.6)
MANUAL DIF COMMENT BLD-IMP: SIGNIFICANT CHANGE UP
MCHC RBC-ENTMCNC: 33.2 PG — SIGNIFICANT CHANGE UP (ref 27–34)
MCHC RBC-ENTMCNC: 34.6 GM/DL — SIGNIFICANT CHANGE UP (ref 32–36)
MCV RBC AUTO: 95.9 FL — SIGNIFICANT CHANGE UP (ref 80–100)
METHOD TYPE: SIGNIFICANT CHANGE UP
PHOSPHATE SERPL-MCNC: 3.1 MG/DL — SIGNIFICANT CHANGE UP (ref 2.5–4.5)
PLATELET # BLD AUTO: 129 K/UL — LOW (ref 150–400)
POTASSIUM SERPL-MCNC: 3.8 MMOL/L — SIGNIFICANT CHANGE UP (ref 3.5–5.3)
POTASSIUM SERPL-SCNC: 3.8 MMOL/L — SIGNIFICANT CHANGE UP (ref 3.5–5.3)
PROT SERPL-MCNC: 6 G/DL — SIGNIFICANT CHANGE UP (ref 6–8.3)
RAPID RVP RESULT: SIGNIFICANT CHANGE UP
RBC # BLD: 2.92 M/UL — LOW (ref 4.2–5.8)
RBC # FLD: 14.1 % — SIGNIFICANT CHANGE UP (ref 10.3–14.5)
SODIUM SERPL-SCNC: 131 MMOL/L — LOW (ref 135–145)
SPECIMEN SOURCE: SIGNIFICANT CHANGE UP
SPECIMEN SOURCE: SIGNIFICANT CHANGE UP
TACROLIMUS SERPL-MCNC: 7.2 NG/ML — SIGNIFICANT CHANGE UP
VANCOMYCIN TROUGH SERPL-MCNC: 9.2 UG/ML — LOW (ref 10–20)
WBC # BLD: 12.1 K/UL — HIGH (ref 3.8–10.5)
WBC # FLD AUTO: 12.1 K/UL — HIGH (ref 3.8–10.5)

## 2019-02-28 PROCEDURE — 99232 SBSQ HOSP IP/OBS MODERATE 35: CPT | Mod: GC

## 2019-02-28 PROCEDURE — 99232 SBSQ HOSP IP/OBS MODERATE 35: CPT | Mod: GC,24

## 2019-02-28 RX ORDER — MAGNESIUM SULFATE 500 MG/ML
2 VIAL (ML) INJECTION ONCE
Qty: 0 | Refills: 0 | Status: COMPLETED | OUTPATIENT
Start: 2019-02-28 | End: 2019-02-28

## 2019-02-28 RX ORDER — HEPARIN SODIUM 5000 [USP'U]/ML
5000 INJECTION INTRAVENOUS; SUBCUTANEOUS EVERY 12 HOURS
Qty: 0 | Refills: 0 | Status: DISCONTINUED | OUTPATIENT
Start: 2019-02-28 | End: 2019-03-05

## 2019-02-28 RX ORDER — MEROPENEM 1 G/30ML
500 INJECTION INTRAVENOUS ONCE
Qty: 0 | Refills: 0 | Status: COMPLETED | OUTPATIENT
Start: 2019-02-28 | End: 2019-02-28

## 2019-02-28 RX ORDER — MEROPENEM 1 G/30ML
INJECTION INTRAVENOUS
Qty: 0 | Refills: 0 | Status: DISCONTINUED | OUTPATIENT
Start: 2019-02-28 | End: 2019-03-05

## 2019-02-28 RX ORDER — MEROPENEM 1 G/30ML
500 INJECTION INTRAVENOUS EVERY 24 HOURS
Qty: 0 | Refills: 0 | Status: DISCONTINUED | OUTPATIENT
Start: 2019-03-01 | End: 2019-03-05

## 2019-02-28 RX ADMIN — Medication 50 GRAM(S): at 08:55

## 2019-02-28 RX ADMIN — HEPARIN SODIUM 5000 UNIT(S): 5000 INJECTION INTRAVENOUS; SUBCUTANEOUS at 18:14

## 2019-02-28 RX ADMIN — Medication 2: at 12:20

## 2019-02-28 RX ADMIN — MEROPENEM 100 MILLIGRAM(S): 1 INJECTION INTRAVENOUS at 08:51

## 2019-02-28 RX ADMIN — ATOVAQUONE 750 MILLIGRAM(S): 750 SUSPENSION ORAL at 18:12

## 2019-02-28 RX ADMIN — Medication 50 GRAM(S): at 08:46

## 2019-02-28 RX ADMIN — Medication 5 MILLIGRAM(S): at 05:08

## 2019-02-28 RX ADMIN — TAMSULOSIN HYDROCHLORIDE 0.4 MILLIGRAM(S): 0.4 CAPSULE ORAL at 22:41

## 2019-02-28 RX ADMIN — PIPERACILLIN AND TAZOBACTAM 25 GRAM(S): 4; .5 INJECTION, POWDER, LYOPHILIZED, FOR SOLUTION INTRAVENOUS at 05:08

## 2019-02-28 RX ADMIN — Medication 100 MILLIGRAM(S): at 05:08

## 2019-02-28 RX ADMIN — Medication 100 MILLIGRAM(S): at 12:28

## 2019-02-28 RX ADMIN — ATOVAQUONE 750 MILLIGRAM(S): 750 SUSPENSION ORAL at 05:08

## 2019-02-28 RX ADMIN — GABAPENTIN 300 MILLIGRAM(S): 400 CAPSULE ORAL at 12:19

## 2019-02-28 RX ADMIN — FAMOTIDINE 20 MILLIGRAM(S): 10 INJECTION INTRAVENOUS at 12:25

## 2019-02-28 RX ADMIN — Medication 650 MILLIGRAM(S): at 04:42

## 2019-02-28 RX ADMIN — Medication 650 MILLIGRAM(S): at 05:59

## 2019-02-28 NOTE — PROGRESS NOTE ADULT - PROBLEM SELECTOR PLAN 1
likely hemodynamic in setting of hypotension w/ sepsis, likely 2/2 UTI. BCx showing GNRs, and CT indicative of cystitis. Pt also endorses cough and diarrhea. Continues to spike temperatures. SCr trending up. Making urine.    - C/w maintenance IVF.  - F/U BCx, UCx, Stool Cx, C.diff, CMV  - Check sputum sample as well  - Monitor BMP  - Monitor ins/outs  - Hold nifedipine  - Switch zosyn to meropenem.

## 2019-02-28 NOTE — PROGRESS NOTE ADULT - ASSESSMENT
· Assessment		  68-year-old male with a past medical history of ESRD secondary to bilateral nephrectomy from neoplasm, hypertension, and NIDDM status post HCV + DDRT (7/17), course complicated by DGF, mild rejection/ATN, and KEREN requiring stenting (removed 2/14/19); now admitted for bacteremia, started on abx.

## 2019-02-28 NOTE — PROGRESS NOTE ADULT - PROBLEM SELECTOR PLAN 2
HepC DDRT on 7/17/18. Donor was 40yoM. CIT of 23 hours. Simulect induction. Had DGF. Last HD around 12/2018. Had ATN, borderline rejection, oxalate crystals on renal biopsy, treated w/ steroids. Had KEREN requiring stenting.   - Now with CHELA.

## 2019-02-28 NOTE — PROVIDER CONTACT NOTE (OTHER) - ACTION/TREATMENT ORDERED:
NP & provider at the bedside. stool softeners d/c'd. waiting for further action/treatment. will continue to monitor.

## 2019-02-28 NOTE — PROGRESS NOTE ADULT - PROBLEM SELECTOR PLAN 1
-One liter fluid bolus in ED, hypotension improved to 146/67  -Continue IVF @100ml/hr  -CT chest/abd/p + for cystitis  -Renal US neg  -RVP neg, UA + for mod blood and large leukocyte esterase, blood culture preliminary + for GNR  -f/u CMV PCR, urine cx  -discontinue Cefepime and vancomycin and start meropenem -One liter fluid bolus in ED, hypotension improved to 146/67  -Continue IVF @100ml/hr  -CT chest/abd/p + for cystitis  -Renal US neg  -RVP neg, UA + for mod blood and large leukocyte esterase, blood culture preliminary + for GNR  -f/u CMV PCR, stool studies, urine cx  -discontinue Cefepime and vancomycin and start meropenem

## 2019-02-28 NOTE — PROGRESS NOTE ADULT - SUBJECTIVE AND OBJECTIVE BOX
Transplant Surgery - Multidisciplinary Rounds  --------------------------------------------------------------  DDRT: Hep C + 2018       Date:  2019, bacteremia            Present:  Patient seen with multidisciplinary team including ( Transplant Surgeon:  Dr. Castaneda,  Dr. Heath, Transplant Nephrologist: Dr. Multani, nephrology fellow Dr. Nix Pharmacist: Abhishek Harrell,Nurse Practitioners: Ilir Thomas, Nick Harp and Brielle Pimentel, NP student Vimal Tena in am rounds and examined with Dr. Heath. Disciplines not in attendance will be notified of the plan.       68-year-old male with a past medical history of ESRD secondary to bilateral nephrectomy from neoplasm, hypertension, and NIDDM status post HCV + DDRT () who's course was complicated by delayed graft function requiring HD, ATN/mild rejection (2017), renal artery stenosis requiring stenting, and perinephric collection requiring drainage was seen on 19 in [facility] for removal of his ureteral stent; admitted for bacteremia. hypotension. Hypotension tx w/ fluid bolus in ED.  CTs of chest/abdomen/pelvis + for cystitis, and transplant renal ultrasound w/ no acute findings. Blood cx + for GNRs, started on abx.     Interval Events: O/N Tmax 39.1, responded to tylenol. Blood cx + for GNR, CT chest/abd/pelvis + for cystitis. Renal US w/ no acute findings. Cr trending up 6.28<5.88, continuing IVF, making adequate Urine.     Potential Discharge date: 3/4/19    Education:  Medications    Plan of care:  See below    MEDICATIONS  (STANDING):  atovaquone Suspension 750 milliGRAM(s) Oral two times a day  dextrose 5%. 1000 milliLiter(s) (50 mL/Hr) IV Continuous <Continuous>  dextrose 50% Injectable 12.5 Gram(s) IV Push once  dextrose 50% Injectable 25 Gram(s) IV Push once  dextrose 50% Injectable 25 Gram(s) IV Push once  docusate sodium 100 milliGRAM(s) Oral three times a day  famotidine    Tablet 20 milliGRAM(s) Oral daily  gabapentin 300 milliGRAM(s) Oral daily  heparin  Injectable 5000 Unit(s) SubCutaneous every 12 hours  insulin lispro (HumaLOG) corrective regimen sliding scale   SubCutaneous three times a day before meals  insulin lispro (HumaLOG) corrective regimen sliding scale   SubCutaneous at bedtime  meropenem  IVPB      predniSONE   Tablet 5 milliGRAM(s) Oral daily  sodium chloride 0.9%. 1000 milliLiter(s) (100 mL/Hr) IV Continuous <Continuous>  tamsulosin 0.4 milliGRAM(s) Oral at bedtime    MEDICATIONS  (PRN):  acetaminophen   Tablet .. 650 milliGRAM(s) Oral every 6 hours PRN Temp greater or equal to 38C (100.4F), Mild Pain (1 - 3)  dextrose 40% Gel 15 Gram(s) Oral once PRN Blood Glucose LESS THAN 70 milliGRAM(s)/deciliter  glucagon  Injectable 1 milliGRAM(s) IntraMuscular once PRN Glucose LESS THAN 70 milligrams/deciliter  senna 2 Tablet(s) Oral at bedtime PRN Constipation      PAST MEDICAL & SURGICAL HISTORY:  Medial meniscus tear: &#x27; 90&#x27;s  Right  Bowel obstruction: &#x27; 2017   surgically treated  Anal fissure: dx: &#x27; 2018   No surgery  Benign prostatic hypertrophy  Hepatitis C: In Donor Kidney: patient received treatment for Hep. C : post treatment: patient  tested Negative for Hep C  Kidney neoplasm: dx: &#x27;  : Left      s/p bilateral Nephrectomy &#x27;   ESRD (end stage renal disease): On Dialysis &#x27;  to 2018  Type 2 diabetes mellitus: dx: &#x27;88  HTN (hypertension)  Kidney transplant recipient: 2018  @ [facility] :  +  Hep C Donor  S/P right knee arthroscopy: &#x27; 90&#x27;s  H/O ileostomy: &#x27; 2017   for 3 months. 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      Vital Signs Last 24 Hrs  T(C): 37.8 (2019 05:59), Max: 39.1 (2019 22:01)  T(F): 100.1 (2019 05:59), Max: 102.3 (2019 22:01)  HR: 105 (2019 04:40) (86 - 107)  BP: 146/67 (2019 04:40) (93/50 - 146/67)  BP(mean): --  RR: 18 (2019 04:40) (17 - 18)  SpO2: 98% (2019 04:40) (96% - 98%)    I&O's Summary    2019 07:01  -  2019 07:00  --------------------------------------------------------  IN: 1680 mL / OUT: 0 mL / NET: 1680 mL                              9.7    12.1  )-----------( 129      ( 2019 06:57 )             28.0         131<L>  |  101  |  65<H>  ----------------------------<  101<H>  3.8   |  14<L>  |  6.28<H>    Ca    8.9      2019 06:57  Phos  3.1       Mg     1.5         TPro  6.0  /  Alb  3.1<L>  /  TBili  1.1  /  DBili  x   /  AST  38  /  ALT  44  /  AlkPhos  86      Tacrolimus (), Serum: 9.0 ng/mL ( @ 20:16)        Culture - Blood (collected 19 @ 17:31)  Source: .Blood Blood-Peripheral  Gram Stain (19 @ 09:17):    Growth in aerobic bottle: Gram Negative Rods    Growth in anaerobic bottle: Gram Negative Rods  Preliminary Report (19 @ 09:17):    Growth in aerobic bottle: Gram Negative Rods    Growth in anaerobic bottle: Gram Negative Rods    Culture - Blood (collected 19 @ 17:31)  Source: .Blood Blood-Peripheral  Gram Stain (19 @ 05:58):    Growth in aerobic bottle: Gram Negative Rods    Growth in anaerobic bottle: Gram Negative Rods  Preliminary Report (19 @ 05:59):    Growth in aerobic bottle: Gram Negative Rods    "Due to technical problems, Proteus sp. will Not be reported as part of    the BCID panel until further notice"    ***Blood Panel PCR results on this specimen are available    approximately 3 hours after the Gram stain result.***    Gram stain, PCR, and/or culture results may not always    correspond due to difference in methodologies.    ************************************************************    This PCR assay was performed using [employer].    The following targets are tested for: Enterococcus,    vancomycin resistant enterococci, Listeria monocytogenes,    coagulase negative staphylococci, S. aureus,    methicillin resistant S. aureus, Streptococcus agalactiae    (Group B), S. pneumoniae, S. pyogenes (Group A),    Acinetobacter baumannii, Enterobacter cloacae, E. coli,    Klebsiella oxytoca, K. pneumoniae, Proteus sp.,    Serratia marcescens, Haemophilus influenzae,    Neisseria meningitidis, Pseudomonas aeruginosa, Candida    albicans, C. glabrata, C krusei, C parapsilosis,    C. tropicalis and the KPC resistance gene.    Growth in anaerobic bottle: Gram Negative Rods        Review of systems  Gen: No weight changes, fatigue, fevers/chills, weakness  Skin: No rashes  Head/Eyes/Ears/Mouth: No headache; Normal hearing; Normal vision w/o blurriness; No sinus pain/discomfort, sore throat. C/o tooth pain X6 months  Respiratory: No dyspnea, cough, wheezing, hemoptysis  CV: No chest pain, PND, orthopnea  GI: +diarrhea, denies constipation, nausea, vomiting, melena, hematochezia  : No increased frequency, dysuria, hematuria, nocturia  MSK: No joint pain/swelling; no back pain; no edema  Neuro: No dizziness/lightheadedness, seizures, numbness, tingling, +weakness  Heme: No easy bruising or bleeding  Endo: No heat/cold intolerance  Psych: No significant nervousness, anxiety, stress, depression  All other systems were reviewed and are negative, except as noted.    PHYSICAL EXAM:  Constitutional: Well developed / well nourished  Eyes: Anicteric, PERRLA  ENMT: nc/at  Neck: no JVD  Respiratory: CTA B/L  Cardiovascular: RRR  Gastrointestinal: Soft abdomen, mild tender to touch at surgical site, ND  Genitourinary: voiding freely  Extremities: SCD's in place and working bilaterally  Vascular: Palpable dp pulses bilaterally  Neurological: A&O x3  Skin: warm/dry  Musculoskeletal: Moving all extremities  Psychiatric: Responsive

## 2019-02-28 NOTE — PROVIDER CONTACT NOTE (OTHER) - BACKGROUND
hx of kidney transplant/ kidney neoplasm, hep c, bowel obstruction, esrd, LAVF +thrill & + bruit. 1 episode of diarrhea.

## 2019-02-28 NOTE — PROVIDER CONTACT NOTE (OTHER) - SITUATION
pt admitted for fever. pt complaining of tremors. pt stated he hasn't had the tremors since admission.

## 2019-02-28 NOTE — PROGRESS NOTE ADULT - PROBLEM SELECTOR PLAN 2
Worsening CHELA.   -Ureteral stent removed 2/14. Additionally had previous perinephric collection.   - Outpatient BUN/Creatinine (2/25) of 46/3.64. Now 65/6.28  - Hold nephrotoxic medications.   - Renal US neg  -abd/pelvis CT + cystits, started fred  -Continue /hr  -Record PVR  - Trend BUN/Creatinine.

## 2019-03-01 DIAGNOSIS — E87.2 ACIDOSIS: ICD-10-CM

## 2019-03-01 DIAGNOSIS — R78.81 BACTEREMIA: ICD-10-CM

## 2019-03-01 DIAGNOSIS — R19.7 DIARRHEA, UNSPECIFIED: ICD-10-CM

## 2019-03-01 LAB
ALBUMIN SERPL ELPH-MCNC: 2.8 G/DL — LOW (ref 3.3–5)
ALP SERPL-CCNC: 97 U/L — SIGNIFICANT CHANGE UP (ref 40–120)
ALT FLD-CCNC: 36 U/L — SIGNIFICANT CHANGE UP (ref 10–45)
ANION GAP SERPL CALC-SCNC: 14 MMOL/L — SIGNIFICANT CHANGE UP (ref 5–17)
ANISOCYTOSIS BLD QL: SLIGHT — SIGNIFICANT CHANGE UP
AST SERPL-CCNC: 31 U/L — SIGNIFICANT CHANGE UP (ref 10–40)
BASOPHILS # BLD AUTO: 0 K/UL — SIGNIFICANT CHANGE UP (ref 0–0.2)
BILIRUB SERPL-MCNC: 0.8 MG/DL — SIGNIFICANT CHANGE UP (ref 0.2–1.2)
BUN SERPL-MCNC: 65 MG/DL — HIGH (ref 7–23)
BURR CELLS BLD QL SMEAR: PRESENT — SIGNIFICANT CHANGE UP
C DIFF GDH STL QL: SIGNIFICANT CHANGE UP
C DIFF GDH STL QL: SIGNIFICANT CHANGE UP
CALCIUM SERPL-MCNC: 8.9 MG/DL — SIGNIFICANT CHANGE UP (ref 8.4–10.5)
CHLORIDE SERPL-SCNC: 104 MMOL/L — SIGNIFICANT CHANGE UP (ref 96–108)
CO2 SERPL-SCNC: 13 MMOL/L — LOW (ref 22–31)
CREAT SERPL-MCNC: 5.71 MG/DL — HIGH (ref 0.5–1.3)
CULTURE RESULTS: SIGNIFICANT CHANGE UP
CULTURE RESULTS: SIGNIFICANT CHANGE UP
DACRYOCYTES BLD QL SMEAR: SLIGHT — SIGNIFICANT CHANGE UP
DOHLE BOD BLD QL SMEAR: PRESENT — SIGNIFICANT CHANGE UP
ELLIPTOCYTES BLD QL SMEAR: SIGNIFICANT CHANGE UP
EOSINOPHIL # BLD AUTO: 0 K/UL — SIGNIFICANT CHANGE UP (ref 0–0.5)
GLUCOSE SERPL-MCNC: 119 MG/DL — HIGH (ref 70–99)
HCT VFR BLD CALC: 27.3 % — LOW (ref 39–50)
HGB BLD-MCNC: 9.4 G/DL — LOW (ref 13–17)
LYMPHOCYTES # BLD AUTO: 0 K/UL — LOW (ref 1–3.3)
LYMPHOCYTES # SPEC AUTO: 3 % — HIGH (ref 0–0)
MACROCYTES BLD QL: SLIGHT — SIGNIFICANT CHANGE UP
MAGNESIUM SERPL-MCNC: 2.3 MG/DL — SIGNIFICANT CHANGE UP (ref 1.6–2.6)
MCHC RBC-ENTMCNC: 32.9 PG — SIGNIFICANT CHANGE UP (ref 27–34)
MCHC RBC-ENTMCNC: 34.3 GM/DL — SIGNIFICANT CHANGE UP (ref 32–36)
MCV RBC AUTO: 95.9 FL — SIGNIFICANT CHANGE UP (ref 80–100)
MICROCYTES BLD QL: SLIGHT — SIGNIFICANT CHANGE UP
MONOCYTES # BLD AUTO: 0.6 K/UL — SIGNIFICANT CHANGE UP (ref 0–0.9)
MONOCYTES NFR BLD AUTO: 5 % — SIGNIFICANT CHANGE UP (ref 2–14)
NEUTROPHILS # BLD AUTO: 10.6 K/UL — HIGH (ref 1.8–7.4)
NEUTROPHILS NFR BLD AUTO: 92 % — HIGH (ref 43–77)
PHOSPHATE SERPL-MCNC: 3.4 MG/DL — SIGNIFICANT CHANGE UP (ref 2.5–4.5)
PLAT MORPH BLD: NORMAL — SIGNIFICANT CHANGE UP
PLATELET # BLD AUTO: 156 K/UL — SIGNIFICANT CHANGE UP (ref 150–400)
POIKILOCYTOSIS BLD QL AUTO: SLIGHT — SIGNIFICANT CHANGE UP
POLYCHROMASIA BLD QL SMEAR: SLIGHT — SIGNIFICANT CHANGE UP
POTASSIUM SERPL-MCNC: 4.2 MMOL/L — SIGNIFICANT CHANGE UP (ref 3.5–5.3)
POTASSIUM SERPL-SCNC: 4.2 MMOL/L — SIGNIFICANT CHANGE UP (ref 3.5–5.3)
PROT SERPL-MCNC: 5.9 G/DL — LOW (ref 6–8.3)
RBC # BLD: 2.84 M/UL — LOW (ref 4.2–5.8)
RBC # FLD: 14.1 % — SIGNIFICANT CHANGE UP (ref 10.3–14.5)
RBC BLD AUTO: ABNORMAL
SODIUM SERPL-SCNC: 131 MMOL/L — LOW (ref 135–145)
SPECIMEN SOURCE: SIGNIFICANT CHANGE UP
SPECIMEN SOURCE: SIGNIFICANT CHANGE UP
TACROLIMUS SERPL-MCNC: 4.9 NG/ML — SIGNIFICANT CHANGE UP
WBC # BLD: 11.5 K/UL — HIGH (ref 3.8–10.5)
WBC # FLD AUTO: 11.5 K/UL — HIGH (ref 3.8–10.5)

## 2019-03-01 PROCEDURE — 99232 SBSQ HOSP IP/OBS MODERATE 35: CPT | Mod: GC,24

## 2019-03-01 RX ORDER — TACROLIMUS 5 MG/1
3 CAPSULE ORAL EVERY 12 HOURS
Qty: 0 | Refills: 0 | Status: DISCONTINUED | OUTPATIENT
Start: 2019-03-01 | End: 2019-03-02

## 2019-03-01 RX ORDER — SODIUM CHLORIDE 9 MG/ML
1000 INJECTION, SOLUTION INTRAVENOUS
Qty: 0 | Refills: 0 | Status: DISCONTINUED | OUTPATIENT
Start: 2019-03-01 | End: 2019-03-03

## 2019-03-01 RX ADMIN — GABAPENTIN 300 MILLIGRAM(S): 400 CAPSULE ORAL at 12:23

## 2019-03-01 RX ADMIN — HEPARIN SODIUM 5000 UNIT(S): 5000 INJECTION INTRAVENOUS; SUBCUTANEOUS at 17:29

## 2019-03-01 RX ADMIN — Medication 4: at 12:23

## 2019-03-01 RX ADMIN — Medication 650 MILLIGRAM(S): at 06:04

## 2019-03-01 RX ADMIN — FAMOTIDINE 20 MILLIGRAM(S): 10 INJECTION INTRAVENOUS at 12:23

## 2019-03-01 RX ADMIN — Medication 5 MILLIGRAM(S): at 04:58

## 2019-03-01 RX ADMIN — MEROPENEM 100 MILLIGRAM(S): 1 INJECTION INTRAVENOUS at 08:30

## 2019-03-01 RX ADMIN — Medication 6: at 17:29

## 2019-03-01 RX ADMIN — ATOVAQUONE 750 MILLIGRAM(S): 750 SUSPENSION ORAL at 17:29

## 2019-03-01 RX ADMIN — TACROLIMUS 3 MILLIGRAM(S): 5 CAPSULE ORAL at 17:29

## 2019-03-01 RX ADMIN — TAMSULOSIN HYDROCHLORIDE 0.4 MILLIGRAM(S): 0.4 CAPSULE ORAL at 21:14

## 2019-03-01 RX ADMIN — ATOVAQUONE 750 MILLIGRAM(S): 750 SUSPENSION ORAL at 04:58

## 2019-03-01 RX ADMIN — SODIUM CHLORIDE 75 MILLILITER(S): 9 INJECTION, SOLUTION INTRAVENOUS at 14:15

## 2019-03-01 RX ADMIN — SODIUM CHLORIDE 100 MILLILITER(S): 9 INJECTION INTRAMUSCULAR; INTRAVENOUS; SUBCUTANEOUS at 08:30

## 2019-03-01 RX ADMIN — Medication 650 MILLIGRAM(S): at 04:58

## 2019-03-01 RX ADMIN — HEPARIN SODIUM 5000 UNIT(S): 5000 INJECTION INTRAVENOUS; SUBCUTANEOUS at 04:58

## 2019-03-01 NOTE — PROGRESS NOTE ADULT - PROBLEM SELECTOR PLAN 2
in setting of renal failure and diarrhea. Serum bicarb low to 13.  - Change IVF as above.  - Check VBG.  - Check lactate.  - Monitor serum bicarb daily.

## 2019-03-01 NOTE — PROGRESS NOTE ADULT - SUBJECTIVE AND OBJECTIVE BOX
Transplant Surgery - Multidisciplinary Rounds  --------------------------------------------------------------  DDRT 2018       Present:   Patient seen with multidisciplinary team including ( Transplant Surgeon: Dr. Castaneda, Dr. Yao, Dr. Heath. Transplant Nephrologist: Dr. Multani.  Pharmacist: Abhishek Harrell. Nurse Practitioner: Silva William and MATY Lopez in am rounds and examined with Dr. Yao. Disciplines not in attendance will be notified of the plan.     HPI: 68-year-old male with a past medical history of ESRD secondary to bilateral nephrectomy from neoplasm, hypertension, and NIDDM status post HCV + DDRT () who's course was complicated by DGF requiring HD, ATN/mild rejection (2017), renal artery stenosis requiring stenting, and perinephric collection requiring drainage. Pt was seen on 19 in [facility] for removal of his ureteral stent.     Admitted with hypotension and bacteremia.  CTs of chest/abdomen/pelvis + for cystitis, and transplant renal ultrasound w/ no acute findings. Blood cx + E. Coli. On meropenem.     Interval Events: Low grade fever overnight, afebrile this morning. Creatinine improving. C/o diarrhea - stool studies pending; c diff ordered    Potential Discharge date: pending clinical improvement     Education:  Medications    Plan of care:  See Below    MEDICATIONS  (STANDING):  atovaquone Suspension 750 milliGRAM(s) Oral two times a day  dextrose 50% Injectable 12.5 Gram(s) IV Push once  dextrose 50% Injectable 25 Gram(s) IV Push once  dextrose 50% Injectable 25 Gram(s) IV Push once  famotidine    Tablet 20 milliGRAM(s) Oral daily  gabapentin 300 milliGRAM(s) Oral daily  heparin  Injectable 5000 Unit(s) SubCutaneous every 12 hours  insulin lispro (HumaLOG) corrective regimen sliding scale   SubCutaneous three times a day before meals  insulin lispro (HumaLOG) corrective regimen sliding scale   SubCutaneous at bedtime  meropenem  IVPB 500 milliGRAM(s) IV Intermittent every 24 hours  meropenem  IVPB      predniSONE   Tablet 5 milliGRAM(s) Oral daily  sodium chloride 0.45% 1000 milliLiter(s) (75 mL/Hr) IV Continuous <Continuous>  sodium chloride 0.9%. 1000 milliLiter(s) (100 mL/Hr) IV Continuous <Continuous>  tamsulosin 0.4 milliGRAM(s) Oral at bedtime    MEDICATIONS  (PRN):  acetaminophen   Tablet .. 650 milliGRAM(s) Oral every 6 hours PRN Temp greater or equal to 38C (100.4F), Mild Pain (1 - 3)  dextrose 40% Gel 15 Gram(s) Oral once PRN Blood Glucose LESS THAN 70 milliGRAM(s)/deciliter  glucagon  Injectable 1 milliGRAM(s) IntraMuscular once PRN Glucose LESS THAN 70 milligrams/deciliter      PAST MEDICAL & SURGICAL HISTORY:  Medial meniscus tear: &#x27; 90&#x27;s  Right  Bowel obstruction: &#x27; 2017   surgically treated  Anal fissure: dx: &#x27; 2018   No surgery  Benign prostatic hypertrophy  Hepatitis C: In Donor Kidney: patient received treatment for Hep. C : post treatment: patient  tested Negative for Hep C  Kidney neoplasm: dx: &#x27;  : Left      s/p bilateral Nephrectomy &#x27; 2015  ESRD (end stage renal disease): On Dialysis &#x27;  to 2018  Type 2 diabetes mellitus: dx: &#x27;88  HTN (hypertension)  Kidney transplant recipient: 2018  @ [facility] :  +  Hep C Donor  S/P right knee arthroscopy: &#x27; 90&#x27;s  H/O ileostomy: &#x27; 2017   for 3 months. 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      Vital Signs Last 24 Hrs  T(C): 36.8 (01 Mar 2019 06:03), Max: 38.2 (01 Mar 2019 04:48)  T(F): 98.2 (01 Mar 2019 06:03), Max: 100.8 (01 Mar 2019 04:48)  HR: 87 (01 Mar 2019 04:48) (87 - 102)  BP: 135/69 (01 Mar 2019 04:48) (116/55 - 139/65)  BP(mean): --  RR: 18 (01 Mar 2019 04:48) (18 - 18)  SpO2: 99% (01 Mar 2019 04:48) (96% - 100%)    I&O's Summary    2019 07:  -  01 Mar 2019 07:00  --------------------------------------------------------  IN: 2780 mL / OUT: 1365 mL / NET: 1415 mL    01 Mar 2019 07:  -  01 Mar 2019 12:56  --------------------------------------------------------  IN: 480 mL / OUT: 0 mL / NET: 480 mL                              9.4    11.5  )-----------( 156      ( 01 Mar 2019 06:28 )             27.3     03-01    131<L>  |  104  |  65<H>  ----------------------------<  119<H>  4.2   |  13<L>  |  5.71<H>    Ca    8.9      01 Mar 2019 06:28  Phos  3.4     03-  Mg     2.3     -    TPro  5.9<L>  /  Alb  2.8<L>  /  TBili  0.8  /  DBili  x   /  AST  31  /  ALT  36  /  AlkPhos  97  -    Tacrolimus (), Serum: 4.9 ng/mL ( @ 08:58)    Review of systems  Gen: No weight changes, fatigue, fevers/chills, weakness  Skin: No rashes  Head/Eyes/Ears/Mouth: No headache; Normal hearing; Normal vision w/o blurriness; No sinus pain/discomfort, sore throat  Respiratory: No dyspnea, cough, wheezing, hemoptysis  CV: No chest pain, PND, orthopnea  GI: C/O mild abdominal pain at surgical site, diarrhea, constipation, nausea, vomiting, melena, hematochezia  : No increased frequency, dysuria, hematuria, nocturia  MSK: No joint pain/swelling; no back pain; no edema  Neuro: No dizziness/lightheadedness, weakness, seizures, numbness, tingling  Heme: No easy bruising or bleeding  Endo: No heat/cold intolerance  Psych: No significant nervousness, anxiety, stress, depression  All other systems were reviewed and are negative, except as noted.      PHYSICAL EXAM:  Constitutional: Well developed / well nourished  Eyes: Anicteric, PERRLA  ENMT: nc/at  Neck: supple, no JVD  Respiratory: CTA B/L  Cardiovascular: RRR  Gastrointestinal: Soft abdomen, mild tender to touch at surgical site, ND  Genitourinary: Voiding spontaneously  Extremities: SCD's in place and working bilaterally  Vascular: Palpable dp pulses bilaterally  Neurological: A&O x3  Skin: no erythema  Musculoskeletal: Moving all extremities  Psychiatric: Responsive

## 2019-03-01 NOTE — PROGRESS NOTE ADULT - SUBJECTIVE AND OBJECTIVE BOX
[facility] DIVISION OF KIDNEY DISEASES AND HYPERTENSION -- FOLLOW UP NOTE  --------------------------------------------------------------------------------  Chief Complaint:  renal transplant    24 hour events/subjective:  Feeling a little better. Eating. Making urine. Still with diarrhea, but has chronic diarrhea.       PAST HISTORY  --------------------------------------------------------------------------------  No significant changes to PMH, PSH, FHx, SHx, unless otherwise noted    ALLERGIES & MEDICATIONS  --------------------------------------------------------------------------------  Allergies    No Known Allergies    Intolerances      Standing Inpatient Medications  atovaquone Suspension 750 milliGRAM(s) Oral two times a day  dextrose 50% Injectable 12.5 Gram(s) IV Push once  dextrose 50% Injectable 25 Gram(s) IV Push once  dextrose 50% Injectable 25 Gram(s) IV Push once  famotidine    Tablet 20 milliGRAM(s) Oral daily  gabapentin 300 milliGRAM(s) Oral daily  heparin  Injectable 5000 Unit(s) SubCutaneous every 12 hours  insulin lispro (HumaLOG) corrective regimen sliding scale   SubCutaneous three times a day before meals  insulin lispro (HumaLOG) corrective regimen sliding scale   SubCutaneous at bedtime  meropenem  IVPB 500 milliGRAM(s) IV Intermittent every 24 hours  meropenem  IVPB      predniSONE   Tablet 5 milliGRAM(s) Oral daily  sodium chloride 0.45% 1000 milliLiter(s) IV Continuous <Continuous>  sodium chloride 0.9%. 1000 milliLiter(s) IV Continuous <Continuous>  tamsulosin 0.4 milliGRAM(s) Oral at bedtime    PRN Inpatient Medications  acetaminophen   Tablet .. 650 milliGRAM(s) Oral every 6 hours PRN  dextrose 40% Gel 15 Gram(s) Oral once PRN  glucagon  Injectable 1 milliGRAM(s) IntraMuscular once PRN      REVIEW OF SYSTEMS  --------------------------------------------------------------------------------  Gen: no fatigue, +fevers  Respiratory: No dyspnea, cough  CV: No chest pain  GI: No abdominal pain, +diarrhea  : No dysuria, hematuria  MSK: No joint pain/swelling; no edema    VITALS/PHYSICAL EXAM  --------------------------------------------------------------------------------  T(C): 36.8 (03-01-19 @ 06:03), Max: 38.2 (03-01-19 @ 04:48)  HR: 87 (03-01-19 @ 04:48) (87 - 102)  BP: 135/69 (03-01-19 @ 04:48) (116/55 - 139/65)  RR: 18 (03-01-19 @ 04:48) (18 - 18)  SpO2: 99% (03-01-19 @ 04:48) (96% - 100%)  Wt(kg): --        02-28-19 @ 07:01  -  03-01-19 @ 07:00  --------------------------------------------------------  IN: 2780 mL / OUT: 1365 mL / NET: 1415 mL    03-01-19 @ 07:01  -  03-01-19 @ 13:09  --------------------------------------------------------  IN: 480 mL / OUT: 0 mL / NET: 480 mL      Physical Exam:  	Gen: NAD  	Pulm: CTA B/L  	CV: RRR, S1S2; no rub  	Abd: +BS, soft, nontender/nondistended; RLQ transplant no ttp   	LE: Warm, no edema  	Neuro: follows commands  	Psych: Normal affect and mood  	Skin: Warm, without rashes              Access: +YOMAIRA TRUJILLO    LABS/STUDIES  --------------------------------------------------------------------------------              9.4    11.5  >-----------<  156      [03-01-19 @ 06:28]              27.3     131  |  104  |  65  ----------------------------<  119      [03-01-19 @ 06:28]  4.2   |  13  |  5.71        Ca     8.9     [03-01-19 @ 06:28]      Mg     2.3     [03-01-19 @ 06:28]      Phos  3.4     [03-01-19 @ 06:28]    TPro  5.9  /  Alb  2.8  /  TBili  0.8  /  DBili  x   /  AST  31  /  ALT  36  /  AlkPhos  97  [03-01-19 @ 06:28]    PT/INR: PT 11.4 , INR 1.00       [02-27-19 @ 15:14]  PTT: 39.7       [02-27-19 @ 15:14]      Creatinine Trend:  SCr 5.71 [03-01 @ 06:28]  SCr 6.28 [02-28 @ 06:57]  SCr 5.88 [02-27 @ 15:14]    Urinalysis - [02-27-19 @ 18:18]      Color Orange / Appearance Turbid / SG 1.017 / pH 6.0      Gluc Negative / Ketone Negative  / Bili Negative / Urobili Negative       Blood Moderate / Protein 300 mg/dL / Leuk Est Large / Nitrite Negative      RBC  / WBC  / Hyaline  / Gran  / Sq Epi  / Non Sq Epi  / Bacteria       HbA1c 5.8      [02-28-19 @ 09:45]

## 2019-03-01 NOTE — PROGRESS NOTE ADULT - PROBLEM SELECTOR PLAN 1
bld cxs (2/27) grew E coli  Urine cx (2/27) - GNB   - continue meropenem, f/u sensitivities   - repeat bld cx to document clearance

## 2019-03-01 NOTE — PHYSICAL THERAPY INITIAL EVALUATION ADULT - PLANNED THERAPY INTERVENTIONS, PT EVAL
gait training/bed mobility training/transfer training/Stair Negotiation Training: Patient will be able to negotiate up & down 1 flight of stairs independently with bilateral rails, step to gait pattern, in 4 weeks.

## 2019-03-01 NOTE — PROGRESS NOTE ADULT - PROBLEM SELECTOR PLAN 1
likely hemodynamic in setting of hypotension w/ sepsis, likely 2/2 UTI. BCx showing E.coli, and CT indicative of cystitis. Pt also endorses cough and diarrhea. SCr finally trending down today. Spiking temperatures, but low grade. Non-oliguric.   - Change IVF to 1/2NS w/ 75mEq bicarb.  - F/U BCx, UCx, Stool Cx, CMV, sputum Cx.  - Monitor BMP  - Monitor ins/outs  - Hold nifedipine  - C/w meropenem- renally dose.

## 2019-03-01 NOTE — PROGRESS NOTE ADULT - PROBLEM SELECTOR PLAN 5
On glimipiride and januvia as outpt. FS well controlled.  - Continue to hold oral hypoglycemics.  - Monitor blood glucose  - ICS.

## 2019-03-01 NOTE — PHYSICAL THERAPY INITIAL EVALUATION ADULT - PERTINENT HX OF CURRENT PROBLEM, REHAB EVAL
as per chart review: Hx of b/l nephrectomies (malignancy) and ESRD on HD (2015) now s/p Hep C+ DDRT (s/p Epclusa), DM, HTN, oxalaturia, BPH, here for weakness, cough, diarrhea, poor PO intake x3 days. Found to be hypotensive to 93/50 in the ER; CHELA, metabolic acidosis

## 2019-03-01 NOTE — PROGRESS NOTE ADULT - PROBLEM SELECTOR PLAN 2
Admitted with CHELA  - continue IVF: 1/2 NS with 75meq bicarb @ 75cc/hr  - renal us neg; monitor creatinine   - Immuno: FK by level, MMF on hold  in setting of bacteremia, Pred 5mg daily

## 2019-03-01 NOTE — PROGRESS NOTE ADULT - ASSESSMENT
68-year-old male with a past medical history of ESRD secondary to bilateral nephrectomy from neoplasm, hypertension, and NIDDM status post HCV + DDRT (7/17), course complicated by DGF, mild rejection/ATN, and KEREN requiring stenting (removed 2/14/19); now admitted for bacteremia. 68-year-old male with a past medical history of ESRD secondary to bilateral nephrectomy from neoplasm, hypertension, and NIDDM status post HCV + DDRT (7/17), course complicated by DGF, mild rejection/ATN, and mild hydronephrosis requiring ureteral stenting (removed 2/14/19); now admitted for bacteremia.

## 2019-03-02 LAB
-  AMIKACIN: SIGNIFICANT CHANGE UP
-  AMIKACIN: SIGNIFICANT CHANGE UP
-  AMPICILLIN/SULBACTAM: SIGNIFICANT CHANGE UP
-  AMPICILLIN/SULBACTAM: SIGNIFICANT CHANGE UP
-  AMPICILLIN: SIGNIFICANT CHANGE UP
-  AMPICILLIN: SIGNIFICANT CHANGE UP
-  AZTREONAM: SIGNIFICANT CHANGE UP
-  AZTREONAM: SIGNIFICANT CHANGE UP
-  CEFAZOLIN: SIGNIFICANT CHANGE UP
-  CEFAZOLIN: SIGNIFICANT CHANGE UP
-  CEFEPIME: SIGNIFICANT CHANGE UP
-  CEFEPIME: SIGNIFICANT CHANGE UP
-  CEFOXITIN: SIGNIFICANT CHANGE UP
-  CEFOXITIN: SIGNIFICANT CHANGE UP
-  CEFTRIAXONE: SIGNIFICANT CHANGE UP
-  CEFTRIAXONE: SIGNIFICANT CHANGE UP
-  CIPROFLOXACIN: SIGNIFICANT CHANGE UP
-  CIPROFLOXACIN: SIGNIFICANT CHANGE UP
-  ERTAPENEM: SIGNIFICANT CHANGE UP
-  ERTAPENEM: SIGNIFICANT CHANGE UP
-  GENTAMICIN: SIGNIFICANT CHANGE UP
-  GENTAMICIN: SIGNIFICANT CHANGE UP
-  IMIPENEM: SIGNIFICANT CHANGE UP
-  IMIPENEM: SIGNIFICANT CHANGE UP
-  LEVOFLOXACIN: SIGNIFICANT CHANGE UP
-  LEVOFLOXACIN: SIGNIFICANT CHANGE UP
-  MEROPENEM: SIGNIFICANT CHANGE UP
-  MEROPENEM: SIGNIFICANT CHANGE UP
-  NITROFURANTOIN: SIGNIFICANT CHANGE UP
-  PIPERACILLIN/TAZOBACTAM: SIGNIFICANT CHANGE UP
-  PIPERACILLIN/TAZOBACTAM: SIGNIFICANT CHANGE UP
-  TIGECYCLINE: SIGNIFICANT CHANGE UP
-  TOBRAMYCIN: SIGNIFICANT CHANGE UP
-  TOBRAMYCIN: SIGNIFICANT CHANGE UP
-  TRIMETHOPRIM/SULFAMETHOXAZOLE: SIGNIFICANT CHANGE UP
-  TRIMETHOPRIM/SULFAMETHOXAZOLE: SIGNIFICANT CHANGE UP
ALBUMIN SERPL ELPH-MCNC: 2.7 G/DL — LOW (ref 3.3–5)
ALP SERPL-CCNC: 128 U/L — HIGH (ref 40–120)
ALT FLD-CCNC: 40 U/L — SIGNIFICANT CHANGE UP (ref 10–45)
ANION GAP SERPL CALC-SCNC: 14 MMOL/L — SIGNIFICANT CHANGE UP (ref 5–17)
AST SERPL-CCNC: 31 U/L — SIGNIFICANT CHANGE UP (ref 10–40)
BILIRUB SERPL-MCNC: 0.5 MG/DL — SIGNIFICANT CHANGE UP (ref 0.2–1.2)
BUN SERPL-MCNC: 60 MG/DL — HIGH (ref 7–23)
CALCIUM SERPL-MCNC: 8.7 MG/DL — SIGNIFICANT CHANGE UP (ref 8.4–10.5)
CHLORIDE SERPL-SCNC: 106 MMOL/L — SIGNIFICANT CHANGE UP (ref 96–108)
CO2 SERPL-SCNC: 14 MMOL/L — LOW (ref 22–31)
CREAT SERPL-MCNC: 4.48 MG/DL — HIGH (ref 0.5–1.3)
CULTURE RESULTS: SIGNIFICANT CHANGE UP
GLUCOSE SERPL-MCNC: 254 MG/DL — HIGH (ref 70–99)
HCT VFR BLD CALC: 27.4 % — LOW (ref 39–50)
HGB BLD-MCNC: 9.6 G/DL — LOW (ref 13–17)
MAGNESIUM SERPL-MCNC: 2.1 MG/DL — SIGNIFICANT CHANGE UP (ref 1.6–2.6)
MCHC RBC-ENTMCNC: 33.4 PG — SIGNIFICANT CHANGE UP (ref 27–34)
MCHC RBC-ENTMCNC: 34.9 GM/DL — SIGNIFICANT CHANGE UP (ref 32–36)
MCV RBC AUTO: 96 FL — SIGNIFICANT CHANGE UP (ref 80–100)
METHOD TYPE: SIGNIFICANT CHANGE UP
METHOD TYPE: SIGNIFICANT CHANGE UP
ORGANISM # SPEC MICROSCOPIC CNT: SIGNIFICANT CHANGE UP
PHOSPHATE SERPL-MCNC: 3.2 MG/DL — SIGNIFICANT CHANGE UP (ref 2.5–4.5)
PLATELET # BLD AUTO: 185 K/UL — SIGNIFICANT CHANGE UP (ref 150–400)
POTASSIUM SERPL-MCNC: 4.4 MMOL/L — SIGNIFICANT CHANGE UP (ref 3.5–5.3)
POTASSIUM SERPL-SCNC: 4.4 MMOL/L — SIGNIFICANT CHANGE UP (ref 3.5–5.3)
PROT SERPL-MCNC: 5.9 G/DL — LOW (ref 6–8.3)
RBC # BLD: 2.86 M/UL — LOW (ref 4.2–5.8)
RBC # FLD: 14.3 % — SIGNIFICANT CHANGE UP (ref 10.3–14.5)
SODIUM SERPL-SCNC: 134 MMOL/L — LOW (ref 135–145)
SPECIMEN SOURCE: SIGNIFICANT CHANGE UP
TACROLIMUS SERPL-MCNC: 4.7 NG/ML — SIGNIFICANT CHANGE UP
WBC # BLD: 8.6 K/UL — SIGNIFICANT CHANGE UP (ref 3.8–10.5)
WBC # FLD AUTO: 8.6 K/UL — SIGNIFICANT CHANGE UP (ref 3.8–10.5)

## 2019-03-02 PROCEDURE — 99232 SBSQ HOSP IP/OBS MODERATE 35: CPT | Mod: GC,24

## 2019-03-02 RX ORDER — SODIUM BICARBONATE 1 MEQ/ML
650 SYRINGE (ML) INTRAVENOUS DAILY
Qty: 0 | Refills: 0 | Status: DISCONTINUED | OUTPATIENT
Start: 2019-03-02 | End: 2019-03-03

## 2019-03-02 RX ORDER — PHENYLEPHRINE-SHARK LIVER OIL-MINERAL OIL-PETROLATUM RECTAL OINTMENT
1 OINTMENT (GRAM) RECTAL
Qty: 0 | Refills: 0 | Status: DISCONTINUED | OUTPATIENT
Start: 2019-03-02 | End: 2019-03-05

## 2019-03-02 RX ORDER — TACROLIMUS 5 MG/1
4 CAPSULE ORAL EVERY 12 HOURS
Qty: 0 | Refills: 0 | Status: DISCONTINUED | OUTPATIENT
Start: 2019-03-02 | End: 2019-03-03

## 2019-03-02 RX ADMIN — ATOVAQUONE 750 MILLIGRAM(S): 750 SUSPENSION ORAL at 17:41

## 2019-03-02 RX ADMIN — GABAPENTIN 300 MILLIGRAM(S): 400 CAPSULE ORAL at 12:17

## 2019-03-02 RX ADMIN — Medication 5 MILLIGRAM(S): at 05:49

## 2019-03-02 RX ADMIN — Medication 4: at 17:01

## 2019-03-02 RX ADMIN — FAMOTIDINE 20 MILLIGRAM(S): 10 INJECTION INTRAVENOUS at 12:17

## 2019-03-02 RX ADMIN — TACROLIMUS 3 MILLIGRAM(S): 5 CAPSULE ORAL at 05:49

## 2019-03-02 RX ADMIN — Medication 4: at 08:19

## 2019-03-02 RX ADMIN — Medication 4: at 12:17

## 2019-03-02 RX ADMIN — HEPARIN SODIUM 5000 UNIT(S): 5000 INJECTION INTRAVENOUS; SUBCUTANEOUS at 05:49

## 2019-03-02 RX ADMIN — HEPARIN SODIUM 5000 UNIT(S): 5000 INJECTION INTRAVENOUS; SUBCUTANEOUS at 17:41

## 2019-03-02 RX ADMIN — ATOVAQUONE 750 MILLIGRAM(S): 750 SUSPENSION ORAL at 05:49

## 2019-03-02 RX ADMIN — Medication 650 MILLIGRAM(S): at 17:41

## 2019-03-02 RX ADMIN — MEROPENEM 100 MILLIGRAM(S): 1 INJECTION INTRAVENOUS at 09:19

## 2019-03-02 RX ADMIN — TAMSULOSIN HYDROCHLORIDE 0.4 MILLIGRAM(S): 0.4 CAPSULE ORAL at 21:28

## 2019-03-02 RX ADMIN — TACROLIMUS 4 MILLIGRAM(S): 5 CAPSULE ORAL at 17:41

## 2019-03-02 NOTE — PROGRESS NOTE ADULT - PROBLEM SELECTOR PLAN 1
bld cxs (2/27) grew E coli  Urine cx (2/27) - E-Coli sensitive to meropenem  - continue meropenem   - repeat bld cx to document clearance

## 2019-03-02 NOTE — PROGRESS NOTE ADULT - ASSESSMENT
68-year-old male with a past medical history of ESRD secondary to bilateral nephrectomy from neoplasm, hypertension, and NIDDM status post HCV + DDRT (7/17), course complicated by DGF, mild rejection/ATN, and mild hydronephrosis requiring ureteral stenting (removed 2/14/19); now admitted for bacteremia and chela.  Placed on abx and CHELA improved with IVF. Discharge planning in progress.

## 2019-03-02 NOTE — PROGRESS NOTE ADULT - PROBLEM SELECTOR PLAN 2
Admitted with CHELA  - continue IVF: 1/2 NS with 75meq bicarb @ 75cc/hr  - renal us neg; monitor creatinine   - Immuno: On tacrolimus, MMF on hold in setting of bacteremia, Pred 5mg daily  Tacrolimus level daily Admitted with CHELA  - continue IVF: 1/2 NS with 75meq bicarb @ 75cc/hr  - Add sodium bicarb 650 QD po  - renal us neg; monitor creatinine   - Immuno: On tacrolimus, MMF on hold in setting of bacteremia, Pred 5mg daily  Tacrolimus level daily

## 2019-03-02 NOTE — PROGRESS NOTE ADULT - SUBJECTIVE AND OBJECTIVE BOX
Transplant Surgery - Multidisciplinary Rounds  --------------------------------------------------------------  DDRT 2018   Admitted for weakness secondary to bacteremia on 19    Present:   Patient seen with multidisciplinary team including Transplant Surgeon: Dr. Yao, NP Ilir Thomas. Disciplines not in attendance will be notified of the plan. Pt seen and examined with Dr. Yao.    HPI: 68-year-old male with a past medical history of ESRD secondary to bilateral nephrectomy from neoplasm, hypertension, and NIDDM status post HCV + DDRT () who's course was complicated by DGF requiring HD, ATN/mild rejection (2017), renal artery stenosis requiring stenting, and perinephric collection requiring drainage.  on 19 in [facility] had removal of his ureteral stent.   Admitted with hypotension, weakness, and bacteremia on 19.  CTs of chest/abdomen/pelvis + for cystitis, and transplant renal ultrasound w/ no acute findings. Blood cx + E. Coli. On meropenem.     Interval Events:  Afebrile this morning. Creatinine improving.  stool studies negative to date, C-Diff indeterminate. Cr trending down 4.48 from 5.71.      Potential Discharge date: pending clinical improvement     Education:  Medications    Plan of care:  See Below    MEDICATIONS  (STANDING):  atovaquone Suspension 750 milliGRAM(s) Oral two times a day  dextrose 50% Injectable 12.5 Gram(s) IV Push once  dextrose 50% Injectable 25 Gram(s) IV Push once  dextrose 50% Injectable 25 Gram(s) IV Push once  famotidine    Tablet 20 milliGRAM(s) Oral daily  gabapentin 300 milliGRAM(s) Oral daily  heparin  Injectable 5000 Unit(s) SubCutaneous every 12 hours  insulin lispro (HumaLOG) corrective regimen sliding scale   SubCutaneous three times a day before meals  insulin lispro (HumaLOG) corrective regimen sliding scale   SubCutaneous at bedtime  meropenem  IVPB 500 milliGRAM(s) IV Intermittent every 24 hours  meropenem  IVPB      predniSONE   Tablet 5 milliGRAM(s) Oral daily  sodium chloride 0.45% 1000 milliLiter(s) (75 mL/Hr) IV Continuous <Continuous>  sodium chloride 0.9%. 1000 milliLiter(s) (100 mL/Hr) IV Continuous <Continuous>  tamsulosin 0.4 milliGRAM(s) Oral at bedtime    MEDICATIONS  (PRN):  acetaminophen   Tablet .. 650 milliGRAM(s) Oral every 6 hours PRN Temp greater or equal to 38C (100.4F), Mild Pain (1 - 3)  dextrose 40% Gel 15 Gram(s) Oral once PRN Blood Glucose LESS THAN 70 milliGRAM(s)/deciliter  glucagon  Injectable 1 milliGRAM(s) IntraMuscular once PRN Glucose LESS THAN 70 milligrams/deciliter      PAST MEDICAL & SURGICAL HISTORY:  Medial meniscus tear: &#x27; 90&#x27;s  Right  Bowel obstruction: &#x27; 2017   surgically treated  Anal fissure: dx: &#x27; 2018   No surgery  Benign prostatic hypertrophy  Hepatitis C: In Donor Kidney: patient received treatment for Hep. C : post treatment: patient  tested Negative for Hep C  Kidney neoplasm: dx: &#x27;  : Left      s/p bilateral Nephrectomy &#x27; 2015  ESRD (end stage renal disease): On Dialysis &#x27;  to 2018  Type 2 diabetes mellitus: dx: &#x27;88  HTN (hypertension)  Kidney transplant recipient: 2018  @ [facility] :  +  Hep C Donor  S/P right knee arthroscopy: &#x27; 90&#x27;s  H/O ileostomy: &#x27; 2017   for 3 months. 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      Vital Signs Last 24 Hrs  T(C): 36.8 (01 Mar 2019 06:03), Max: 38.2 (01 Mar 2019 04:48)  T(F): 98.2 (01 Mar 2019 06:03), Max: 100.8 (01 Mar 2019 04:48)  HR: 87 (01 Mar 2019 04:48) (87 - 102)  BP: 135/69 (01 Mar 2019 04:48) (116/55 - 139/65)  BP(mean): --  RR: 18 (01 Mar 2019 04:48) (18 - 18)  SpO2: 99% (01 Mar 2019 04:48) (96% - 100%)    I&O's Summary    2019 07:  -  01 Mar 2019 07:00  --------------------------------------------------------  IN: 2780 mL / OUT: 1365 mL / NET: 1415 mL    01 Mar 2019 07:  -  01 Mar 2019 12:56  --------------------------------------------------------  IN: 480 mL / OUT: 0 mL / NET: 480 mL                              9.4    11.5  )-----------( 156      ( 01 Mar 2019 06:28 )             27.3         131<L>  |  104  |  65<H>  ----------------------------<  119<H>  4.2   |  13<L>  |  5.71<H>    Ca    8.9      01 Mar 2019 06:28  Phos  3.4       Mg     2.3         TPro  5.9<L>  /  Alb  2.8<L>  /  TBili  0.8  /  DBili  x   /  AST  31  /  ALT  36  /  AlkPhos  97      Tacrolimus (), Serum: 4.9 ng/mL ( @ 08:58)    Review of systems  Gen: No weight changes, fatigue, fevers/chills, weakness  Skin: No rashes  Head/Eyes/Ears/Mouth: No headache; Normal hearing; Normal vision w/o blurriness; No sinus pain/discomfort, sore throat  Respiratory: No dyspnea, cough, wheezing, hemoptysis  CV: No chest pain, PND, orthopnea  GI: +diarrhea, no constipation, nausea, vomiting, melena, hematochezia  : No increased frequency, dysuria, hematuria, nocturia  MSK: No joint pain/swelling; no back pain; no edema  Neuro: No dizziness/lightheadedness, weakness, seizures, numbness, tingling  Heme: No easy bruising or bleeding  Endo: No heat/cold intolerance  Psych: No significant nervousness, anxiety, stress, depression  All other systems were reviewed and are negative, except as noted.      PHYSICAL EXAM:  Constitutional: Well developed / well nourished  Eyes: Anicteric, PERRLA  ENMT: nc/at  Neck: supple, no JVD  Respiratory: CTA B/L  Cardiovascular: RRR  Gastrointestinal: Soft abdomen, improving diarrhea, ND  Genitourinary: Voiding spontaneously  Extremities: SCD's in place and working bilaterally  Vascular: Palpable dp pulses bilaterally  Neurological: A&O x3  Skin: no erythema  Musculoskeletal: Moving all extremities  Psychiatric: Responsive

## 2019-03-03 ENCOUNTER — TRANSCRIPTION ENCOUNTER (OUTPATIENT)
Age: 69
End: 2019-03-03

## 2019-03-03 LAB
ALBUMIN SERPL ELPH-MCNC: 2.6 G/DL — LOW (ref 3.3–5)
ALP SERPL-CCNC: 94 U/L — SIGNIFICANT CHANGE UP (ref 40–120)
ALT FLD-CCNC: 35 U/L — SIGNIFICANT CHANGE UP (ref 10–45)
ANION GAP SERPL CALC-SCNC: 12 MMOL/L — SIGNIFICANT CHANGE UP (ref 5–17)
AST SERPL-CCNC: 23 U/L — SIGNIFICANT CHANGE UP (ref 10–40)
BILIRUB SERPL-MCNC: 0.4 MG/DL — SIGNIFICANT CHANGE UP (ref 0.2–1.2)
BUN SERPL-MCNC: 54 MG/DL — HIGH (ref 7–23)
CALCIUM SERPL-MCNC: 8.7 MG/DL — SIGNIFICANT CHANGE UP (ref 8.4–10.5)
CHLORIDE SERPL-SCNC: 110 MMOL/L — HIGH (ref 96–108)
CO2 SERPL-SCNC: 17 MMOL/L — LOW (ref 22–31)
CREAT SERPL-MCNC: 3.66 MG/DL — HIGH (ref 0.5–1.3)
GLUCOSE SERPL-MCNC: 217 MG/DL — HIGH (ref 70–99)
HCT VFR BLD CALC: 26.6 % — LOW (ref 39–50)
HGB BLD-MCNC: 9 G/DL — LOW (ref 13–17)
MAGNESIUM SERPL-MCNC: 2 MG/DL — SIGNIFICANT CHANGE UP (ref 1.6–2.6)
MCHC RBC-ENTMCNC: 32 PG — SIGNIFICANT CHANGE UP (ref 27–34)
MCHC RBC-ENTMCNC: 33.7 GM/DL — SIGNIFICANT CHANGE UP (ref 32–36)
MCV RBC AUTO: 95 FL — SIGNIFICANT CHANGE UP (ref 80–100)
PHOSPHATE SERPL-MCNC: 3.2 MG/DL — SIGNIFICANT CHANGE UP (ref 2.5–4.5)
PLATELET # BLD AUTO: 222 K/UL — SIGNIFICANT CHANGE UP (ref 150–400)
POTASSIUM SERPL-MCNC: 4.3 MMOL/L — SIGNIFICANT CHANGE UP (ref 3.5–5.3)
POTASSIUM SERPL-SCNC: 4.3 MMOL/L — SIGNIFICANT CHANGE UP (ref 3.5–5.3)
PROT SERPL-MCNC: 5.9 G/DL — LOW (ref 6–8.3)
RBC # BLD: 2.8 M/UL — LOW (ref 4.2–5.8)
RBC # FLD: 14 % — SIGNIFICANT CHANGE UP (ref 10.3–14.5)
SODIUM SERPL-SCNC: 139 MMOL/L — SIGNIFICANT CHANGE UP (ref 135–145)
TACROLIMUS SERPL-MCNC: 4.9 NG/ML — SIGNIFICANT CHANGE UP
WBC # BLD: 6.6 K/UL — SIGNIFICANT CHANGE UP (ref 3.8–10.5)
WBC # FLD AUTO: 6.6 K/UL — SIGNIFICANT CHANGE UP (ref 3.8–10.5)

## 2019-03-03 PROCEDURE — 99232 SBSQ HOSP IP/OBS MODERATE 35: CPT | Mod: GC,24

## 2019-03-03 RX ORDER — SODIUM BICARBONATE 1 MEQ/ML
650 SYRINGE (ML) INTRAVENOUS THREE TIMES A DAY
Qty: 0 | Refills: 0 | Status: DISCONTINUED | OUTPATIENT
Start: 2019-03-03 | End: 2019-03-04

## 2019-03-03 RX ORDER — TACROLIMUS 5 MG/1
4.5 CAPSULE ORAL EVERY 12 HOURS
Qty: 0 | Refills: 0 | Status: DISCONTINUED | OUTPATIENT
Start: 2019-03-03 | End: 2019-03-05

## 2019-03-03 RX ORDER — INSULIN LISPRO 100/ML
3 VIAL (ML) SUBCUTANEOUS
Qty: 0 | Refills: 0 | Status: DISCONTINUED | OUTPATIENT
Start: 2019-03-03 | End: 2019-03-05

## 2019-03-03 RX ORDER — INSULIN GLARGINE 100 [IU]/ML
6 INJECTION, SOLUTION SUBCUTANEOUS AT BEDTIME
Qty: 0 | Refills: 0 | Status: DISCONTINUED | OUTPATIENT
Start: 2019-03-03 | End: 2019-03-05

## 2019-03-03 RX ADMIN — TACROLIMUS 4 MILLIGRAM(S): 5 CAPSULE ORAL at 05:56

## 2019-03-03 RX ADMIN — GABAPENTIN 300 MILLIGRAM(S): 400 CAPSULE ORAL at 12:10

## 2019-03-03 RX ADMIN — HEPARIN SODIUM 5000 UNIT(S): 5000 INJECTION INTRAVENOUS; SUBCUTANEOUS at 17:37

## 2019-03-03 RX ADMIN — FAMOTIDINE 20 MILLIGRAM(S): 10 INJECTION INTRAVENOUS at 12:10

## 2019-03-03 RX ADMIN — Medication 5 MILLIGRAM(S): at 05:56

## 2019-03-03 RX ADMIN — TACROLIMUS 4.5 MILLIGRAM(S): 5 CAPSULE ORAL at 17:38

## 2019-03-03 RX ADMIN — Medication 650 MILLIGRAM(S): at 14:01

## 2019-03-03 RX ADMIN — Medication 2: at 16:54

## 2019-03-03 RX ADMIN — INSULIN GLARGINE 6 UNIT(S): 100 INJECTION, SOLUTION SUBCUTANEOUS at 21:18

## 2019-03-03 RX ADMIN — ATOVAQUONE 750 MILLIGRAM(S): 750 SUSPENSION ORAL at 05:56

## 2019-03-03 RX ADMIN — TAMSULOSIN HYDROCHLORIDE 0.4 MILLIGRAM(S): 0.4 CAPSULE ORAL at 21:18

## 2019-03-03 RX ADMIN — Medication 4: at 08:19

## 2019-03-03 RX ADMIN — HEPARIN SODIUM 5000 UNIT(S): 5000 INJECTION INTRAVENOUS; SUBCUTANEOUS at 05:56

## 2019-03-03 RX ADMIN — ATOVAQUONE 750 MILLIGRAM(S): 750 SUSPENSION ORAL at 17:38

## 2019-03-03 RX ADMIN — MEROPENEM 100 MILLIGRAM(S): 1 INJECTION INTRAVENOUS at 08:44

## 2019-03-03 RX ADMIN — Medication 6: at 12:03

## 2019-03-03 RX ADMIN — Medication 3 UNIT(S): at 16:54

## 2019-03-03 RX ADMIN — SODIUM CHLORIDE 75 MILLILITER(S): 9 INJECTION, SOLUTION INTRAVENOUS at 04:38

## 2019-03-03 RX ADMIN — Medication 650 MILLIGRAM(S): at 21:18

## 2019-03-03 NOTE — PROGRESS NOTE ADULT - PROBLEM SELECTOR PLAN 2
Admitted with CHELA  - Cr improving D/C IVF  - Increase sodium bicarb 650 QD po to TID  - renal us neg; monitor creatinine   - Immuno: On tacrolimus, MMF on hold in setting of bacteremia, Pred 5mg daily  Tacrolimus level daily

## 2019-03-03 NOTE — PROGRESS NOTE ADULT - PROBLEM SELECTOR PLAN 1
bld cxs (2/27) grew E coli  Urine cx (2/27) - E-Coli sensitive to meropenem  - continue meropenem   - repeat bld cx to date negative

## 2019-03-03 NOTE — PROGRESS NOTE ADULT - SUBJECTIVE AND OBJECTIVE BOX
Transplant Surgery - Multidisciplinary Rounds  --------------------------------------------------------------  DDRT 2018   Admitted for weakness secondary to bacteremia on 19    Present:   Patient seen with multidisciplinary team including Transplant Surgeon: Dr. Yao, NP Ilir Thomas. Disciplines not in attendance will be notified of the plan. Pt seen and examined with Dr. Yao.    HPI: 68-year-old male with a past medical history of ESRD secondary to bilateral nephrectomy from neoplasm, hypertension, and NIDDM status post HCV + DDRT () who's course was complicated by DGF requiring HD, ATN/mild rejection (2017), renal artery stenosis requiring stenting, and perinephric collection requiring drainage.  on 19 in [facility] had removal of his ureteral stent.   Admitted with hypotension, weakness, and bacteremia on 19.  CTs of chest/abdomen/pelvis + for cystitis, and transplant renal ultrasound w/ no acute findings. Blood cx + E. Coli. On meropenem.     Interval Events:  Afebrile. Creatinine improving.  stool studies negative to date, C-Diff indeterminate. Cr trending down 3.66 from 4.48. BG remains elevated in the 200's.    Potential Discharge date: pending clinical improvement     Education:  Medications    Plan of care:  See Below    MEDICATIONS  (STANDING):  atovaquone Suspension 750 milliGRAM(s) Oral two times a day  dextrose 50% Injectable 12.5 Gram(s) IV Push once  dextrose 50% Injectable 25 Gram(s) IV Push once  dextrose 50% Injectable 25 Gram(s) IV Push once  famotidine    Tablet 20 milliGRAM(s) Oral daily  gabapentin 300 milliGRAM(s) Oral daily  heparin  Injectable 5000 Unit(s) SubCutaneous every 12 hours  insulin lispro (HumaLOG) corrective regimen sliding scale   SubCutaneous three times a day before meals  insulin lispro (HumaLOG) corrective regimen sliding scale   SubCutaneous at bedtime  meropenem  IVPB 500 milliGRAM(s) IV Intermittent every 24 hours  meropenem  IVPB      predniSONE   Tablet 5 milliGRAM(s) Oral daily  sodium chloride 0.45% 1000 milliLiter(s) (75 mL/Hr) IV Continuous <Continuous>  sodium chloride 0.9%. 1000 milliLiter(s) (100 mL/Hr) IV Continuous <Continuous>  tamsulosin 0.4 milliGRAM(s) Oral at bedtime    MEDICATIONS  (PRN):  acetaminophen   Tablet .. 650 milliGRAM(s) Oral every 6 hours PRN Temp greater or equal to 38C (100.4F), Mild Pain (1 - 3)  dextrose 40% Gel 15 Gram(s) Oral once PRN Blood Glucose LESS THAN 70 milliGRAM(s)/deciliter  glucagon  Injectable 1 milliGRAM(s) IntraMuscular once PRN Glucose LESS THAN 70 milligrams/deciliter      PAST MEDICAL & SURGICAL HISTORY:  Medial meniscus tear: &#x27; 90&#x27;s  Right  Bowel obstruction: &#x27; 2017   surgically treated  Anal fissure: dx: &#x27; 2018   No surgery  Benign prostatic hypertrophy  Hepatitis C: In Donor Kidney: patient received treatment for Hep. C : post treatment: patient  tested Negative for Hep C  Kidney neoplasm: dx: &#x27;  : Left      s/p bilateral Nephrectomy &#x27; 2015  ESRD (end stage renal disease): On Dialysis &#x27;  to 2018  Type 2 diabetes mellitus: dx: &#x27;88  HTN (hypertension)  Kidney transplant recipient: 2018  @ [facility] :  +  Hep C Donor  S/P right knee arthroscopy: &#x27; 90&#x27;s  H/O ileostomy: &#x27; 2017   for 3 months. 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      Vital Signs Last 24 Hrs  T(C): 36.8 (01 Mar 2019 06:03), Max: 38.2 (01 Mar 2019 04:48)  T(F): 98.2 (01 Mar 2019 06:03), Max: 100.8 (01 Mar 2019 04:48)  HR: 87 (01 Mar 2019 04:48) (87 - 102)  BP: 135/69 (01 Mar 2019 04:48) (116/55 - 139/65)  BP(mean): --  RR: 18 (01 Mar 2019 04:48) (18 - 18)  SpO2: 99% (01 Mar 2019 04:48) (96% - 100%)    I&O's Summary    2019 07:  -  01 Mar 2019 07:00  --------------------------------------------------------  IN: 2780 mL / OUT: 1365 mL / NET: 1415 mL    01 Mar 2019 07:01  -  01 Mar 2019 12:56  --------------------------------------------------------  IN: 480 mL / OUT: 0 mL / NET: 480 mL                              9.4    11.5  )-----------( 156      ( 01 Mar 2019 06:28 )             27.3         131<L>  |  104  |  65<H>  ----------------------------<  119<H>  4.2   |  13<L>  |  5.71<H>    Ca    8.9      01 Mar 2019 06:28  Phos  3.4       Mg     2.3         TPro  5.9<L>  /  Alb  2.8<L>  /  TBili  0.8  /  DBili  x   /  AST  31  /  ALT  36  /  AlkPhos  97      Tacrolimus (), Serum: 4.9 ng/mL ( @ 08:58)    Review of systems  Gen: No weight changes, fatigue, fevers/chills, weakness  Skin: No rashes  Head/Eyes/Ears/Mouth: No headache; Normal hearing; Normal vision w/o blurriness; No sinus pain/discomfort, sore throat  Respiratory: No dyspnea, cough, wheezing, hemoptysis  CV: No chest pain, PND, orthopnea  GI: +diarrhea, no constipation, nausea, vomiting, melena, hematochezia  : No increased frequency, dysuria, hematuria, nocturia  MSK: No joint pain/swelling; no back pain; no edema  Neuro: No dizziness/lightheadedness, weakness, seizures, numbness, tingling  Heme: No easy bruising or bleeding  Endo: No heat/cold intolerance  Psych: No significant nervousness, anxiety, stress, depression  All other systems were reviewed and are negative, except as noted.      PHYSICAL EXAM:  Constitutional: Well developed / well nourished  Eyes: Anicteric, PERRLA  ENMT: nc/at  Neck: supple, no JVD  Respiratory: CTA B/L  Cardiovascular: RRR  Gastrointestinal: Soft abdomen, improving diarrhea, ND  Genitourinary: Voiding spontaneously  Extremities: SCD's in place and working bilaterally  Vascular: Palpable dp pulses bilaterally  Neurological: A&O x3  Skin: no erythema  Musculoskeletal: Moving all extremities  Psychiatric: Responsive

## 2019-03-04 DIAGNOSIS — I10 ESSENTIAL (PRIMARY) HYPERTENSION: ICD-10-CM

## 2019-03-04 DIAGNOSIS — N39.0 URINARY TRACT INFECTION, SITE NOT SPECIFIED: ICD-10-CM

## 2019-03-04 LAB
ALBUMIN SERPL ELPH-MCNC: 2.6 G/DL — LOW (ref 3.3–5)
ALP SERPL-CCNC: 89 U/L — SIGNIFICANT CHANGE UP (ref 40–120)
ALT FLD-CCNC: 31 U/L — SIGNIFICANT CHANGE UP (ref 10–45)
ANION GAP SERPL CALC-SCNC: 12 MMOL/L — SIGNIFICANT CHANGE UP (ref 5–17)
AST SERPL-CCNC: 25 U/L — SIGNIFICANT CHANGE UP (ref 10–40)
BASOPHILS # BLD AUTO: 0 K/UL — SIGNIFICANT CHANGE UP (ref 0–0.2)
BASOPHILS NFR BLD AUTO: 0.3 % — SIGNIFICANT CHANGE UP (ref 0–2)
BILIRUB SERPL-MCNC: 0.5 MG/DL — SIGNIFICANT CHANGE UP (ref 0.2–1.2)
BUN SERPL-MCNC: 49 MG/DL — HIGH (ref 7–23)
CALCIUM SERPL-MCNC: 9 MG/DL — SIGNIFICANT CHANGE UP (ref 8.4–10.5)
CHLORIDE SERPL-SCNC: 112 MMOL/L — HIGH (ref 96–108)
CMV DNA CSF QL NAA+PROBE: SIGNIFICANT CHANGE UP
CMV DNA SPEC NAA+PROBE-LOG#: SIGNIFICANT CHANGE UP LOGIU/ML
CO2 SERPL-SCNC: 16 MMOL/L — LOW (ref 22–31)
CREAT SERPL-MCNC: 3.29 MG/DL — HIGH (ref 0.5–1.3)
EOSINOPHIL # BLD AUTO: 0.1 K/UL — SIGNIFICANT CHANGE UP (ref 0–0.5)
EOSINOPHIL NFR BLD AUTO: 1.7 % — SIGNIFICANT CHANGE UP (ref 0–6)
GLUCOSE SERPL-MCNC: 145 MG/DL — HIGH (ref 70–99)
HCT VFR BLD CALC: 26.7 % — LOW (ref 39–50)
HGB BLD-MCNC: 9.4 G/DL — LOW (ref 13–17)
LYMPHOCYTES # BLD AUTO: 0.6 K/UL — LOW (ref 1–3.3)
LYMPHOCYTES # BLD AUTO: 10.4 % — LOW (ref 13–44)
MAGNESIUM SERPL-MCNC: 2 MG/DL — SIGNIFICANT CHANGE UP (ref 1.6–2.6)
MCHC RBC-ENTMCNC: 33.9 PG — SIGNIFICANT CHANGE UP (ref 27–34)
MCHC RBC-ENTMCNC: 35.2 GM/DL — SIGNIFICANT CHANGE UP (ref 32–36)
MCV RBC AUTO: 96.1 FL — SIGNIFICANT CHANGE UP (ref 80–100)
MONOCYTES # BLD AUTO: 0.7 K/UL — SIGNIFICANT CHANGE UP (ref 0–0.9)
MONOCYTES NFR BLD AUTO: 12.5 % — SIGNIFICANT CHANGE UP (ref 2–14)
NEUTROPHILS # BLD AUTO: 4.5 K/UL — SIGNIFICANT CHANGE UP (ref 1.8–7.4)
NEUTROPHILS NFR BLD AUTO: 75.1 % — SIGNIFICANT CHANGE UP (ref 43–77)
PHOSPHATE SERPL-MCNC: 3.6 MG/DL — SIGNIFICANT CHANGE UP (ref 2.5–4.5)
PLAT MORPH BLD: NORMAL — SIGNIFICANT CHANGE UP
PLATELET # BLD AUTO: 246 K/UL — SIGNIFICANT CHANGE UP (ref 150–400)
POTASSIUM SERPL-MCNC: 4.3 MMOL/L — SIGNIFICANT CHANGE UP (ref 3.5–5.3)
POTASSIUM SERPL-SCNC: 4.3 MMOL/L — SIGNIFICANT CHANGE UP (ref 3.5–5.3)
PROT SERPL-MCNC: 6.1 G/DL — SIGNIFICANT CHANGE UP (ref 6–8.3)
RBC # BLD: 2.78 M/UL — LOW (ref 4.2–5.8)
RBC # FLD: 13.9 % — SIGNIFICANT CHANGE UP (ref 10.3–14.5)
RBC BLD AUTO: SIGNIFICANT CHANGE UP
SODIUM SERPL-SCNC: 140 MMOL/L — SIGNIFICANT CHANGE UP (ref 135–145)
TACROLIMUS SERPL-MCNC: 7.1 NG/ML — SIGNIFICANT CHANGE UP
WBC # BLD: 6 K/UL — SIGNIFICANT CHANGE UP (ref 3.8–10.5)
WBC # FLD AUTO: 6 K/UL — SIGNIFICANT CHANGE UP (ref 3.8–10.5)

## 2019-03-04 PROCEDURE — 99232 SBSQ HOSP IP/OBS MODERATE 35: CPT | Mod: GC

## 2019-03-04 PROCEDURE — 99232 SBSQ HOSP IP/OBS MODERATE 35: CPT | Mod: GC,24

## 2019-03-04 RX ORDER — NIFEDIPINE 30 MG
30 TABLET, EXTENDED RELEASE 24 HR ORAL DAILY
Qty: 0 | Refills: 0 | Status: DISCONTINUED | OUTPATIENT
Start: 2019-03-04 | End: 2019-03-05

## 2019-03-04 RX ORDER — CALCIUM CARBONATE 500(1250)
1 TABLET ORAL THREE TIMES A DAY
Qty: 0 | Refills: 0 | Status: DISCONTINUED | OUTPATIENT
Start: 2019-03-04 | End: 2019-03-05

## 2019-03-04 RX ORDER — SODIUM BICARBONATE 1 MEQ/ML
1300 SYRINGE (ML) INTRAVENOUS
Qty: 0 | Refills: 0 | Status: DISCONTINUED | OUTPATIENT
Start: 2019-03-04 | End: 2019-03-05

## 2019-03-04 RX ADMIN — Medication 1 TABLET(S): at 13:00

## 2019-03-04 RX ADMIN — GABAPENTIN 300 MILLIGRAM(S): 400 CAPSULE ORAL at 11:10

## 2019-03-04 RX ADMIN — Medication 2: at 08:12

## 2019-03-04 RX ADMIN — PHENYLEPHRINE-SHARK LIVER OIL-MINERAL OIL-PETROLATUM RECTAL OINTMENT 1 APPLICATION(S): at 21:41

## 2019-03-04 RX ADMIN — Medication 650 MILLIGRAM(S): at 05:11

## 2019-03-04 RX ADMIN — INSULIN GLARGINE 6 UNIT(S): 100 INJECTION, SOLUTION SUBCUTANEOUS at 21:41

## 2019-03-04 RX ADMIN — Medication 3 UNIT(S): at 17:03

## 2019-03-04 RX ADMIN — TACROLIMUS 4.5 MILLIGRAM(S): 5 CAPSULE ORAL at 05:11

## 2019-03-04 RX ADMIN — TAMSULOSIN HYDROCHLORIDE 0.4 MILLIGRAM(S): 0.4 CAPSULE ORAL at 21:41

## 2019-03-04 RX ADMIN — Medication 3 UNIT(S): at 12:22

## 2019-03-04 RX ADMIN — FAMOTIDINE 20 MILLIGRAM(S): 10 INJECTION INTRAVENOUS at 11:10

## 2019-03-04 RX ADMIN — ATOVAQUONE 750 MILLIGRAM(S): 750 SUSPENSION ORAL at 18:04

## 2019-03-04 RX ADMIN — HEPARIN SODIUM 5000 UNIT(S): 5000 INJECTION INTRAVENOUS; SUBCUTANEOUS at 18:06

## 2019-03-04 RX ADMIN — Medication 2: at 17:03

## 2019-03-04 RX ADMIN — TACROLIMUS 4.5 MILLIGRAM(S): 5 CAPSULE ORAL at 18:06

## 2019-03-04 RX ADMIN — MEROPENEM 100 MILLIGRAM(S): 1 INJECTION INTRAVENOUS at 08:50

## 2019-03-04 RX ADMIN — Medication 3 UNIT(S): at 08:12

## 2019-03-04 RX ADMIN — Medication 4: at 12:23

## 2019-03-04 RX ADMIN — PHENYLEPHRINE-SHARK LIVER OIL-MINERAL OIL-PETROLATUM RECTAL OINTMENT 1 APPLICATION(S): at 09:58

## 2019-03-04 RX ADMIN — Medication 30 MILLIGRAM(S): at 13:00

## 2019-03-04 RX ADMIN — ATOVAQUONE 750 MILLIGRAM(S): 750 SUSPENSION ORAL at 05:11

## 2019-03-04 RX ADMIN — Medication 5 MILLIGRAM(S): at 05:11

## 2019-03-04 RX ADMIN — HEPARIN SODIUM 5000 UNIT(S): 5000 INJECTION INTRAVENOUS; SUBCUTANEOUS at 05:11

## 2019-03-04 RX ADMIN — Medication 1300 MILLIGRAM(S): at 18:05

## 2019-03-04 RX ADMIN — Medication 1 TABLET(S): at 21:41

## 2019-03-04 NOTE — PROGRESS NOTE ADULT - PROBLEM SELECTOR PLAN 2
in setting of renal failure and diarrhea. Latest serum CO2 noted to be low but stable at 16. Continue with sodium bicarbonate 1300 mg BID. Monitor serum CO2 level.

## 2019-03-04 NOTE — PROGRESS NOTE ADULT - PROBLEM SELECTOR PLAN 5
On glimipiride and januvia as outpt. FS well controlled.  - Continue to hold oral hypoglycemics.  - Monitor blood glucose  - ICS. Urine cultures and blood cultures done on 2/27 showed growth of E.coli. Patient received one dose of cefepime on 2/27/19 and is currently on day 5 on IV meropenem. Repeat blood cultures are negative and patient is afebrile. Recommend to complete 1 week of antibiotics and then can be switched to PO ciprofloxacin.

## 2019-03-04 NOTE — PROGRESS NOTE ADULT - PROBLEM SELECTOR PLAN 6
Monitor and replete. On glimipiride and januvia as outpt. FS well controlled.  - Continue to hold oral hypoglycemics.  - Monitor blood glucose  - ICS.

## 2019-03-04 NOTE — PROGRESS NOTE ADULT - PROBLEM SELECTOR PLAN 2
- good graft function  - Immuno: FK by level, cont to hold MMF, Pred 5mg daily  - Increase Sod Bicarb 1500mg bid

## 2019-03-04 NOTE — PROGRESS NOTE ADULT - SUBJECTIVE AND OBJECTIVE BOX
[facility] Division of Kidney Diseases & Hypertension  FOLLOW UP NOTE  [phone]--------------------------------------------------------------------------------  Chief Complaint: Fever     24 hour events/subjective:    Patient seen and examined in AM  Has complains of diarrhea   Denies c/o SOB, CP, abdominal pain, dysuria, nausea, vomiting.     PAST HISTORY  --------------------------------------------------------------------------------  No significant changes to PMH, PSH, FHx, SHx, unless otherwise noted    ALLERGIES & MEDICATIONS  --------------------------------------------------------------------------------  Allergies    No Known Allergies    Intolerances      Standing Inpatient Medications  atovaquone Suspension 750 milliGRAM(s) Oral two times a day  calcium carbonate    500 mG (Tums) Chewable 1 Tablet(s) Chew three times a day  dextrose 50% Injectable 12.5 Gram(s) IV Push once  dextrose 50% Injectable 25 Gram(s) IV Push once  dextrose 50% Injectable 25 Gram(s) IV Push once  famotidine    Tablet 20 milliGRAM(s) Oral daily  gabapentin 300 milliGRAM(s) Oral daily  hemorrhoidal Ointment 1 Application(s) Rectal two times a day  heparin  Injectable 5000 Unit(s) SubCutaneous every 12 hours  insulin glargine Injectable (LANTUS) 6 Unit(s) SubCutaneous at bedtime  insulin lispro (HumaLOG) corrective regimen sliding scale   SubCutaneous three times a day before meals  insulin lispro (HumaLOG) corrective regimen sliding scale   SubCutaneous at bedtime  insulin lispro Injectable (HumaLOG) 3 Unit(s) SubCutaneous three times a day before meals  meropenem  IVPB 500 milliGRAM(s) IV Intermittent every 24 hours  meropenem  IVPB      NIFEdipine XL 30 milliGRAM(s) Oral daily  predniSONE   Tablet 5 milliGRAM(s) Oral daily  sodium bicarbonate 1300 milliGRAM(s) Oral two times a day  tacrolimus 4.5 milliGRAM(s) Oral every 12 hours  tamsulosin 0.4 milliGRAM(s) Oral at bedtime    PRN Inpatient Medications  acetaminophen   Tablet .. 650 milliGRAM(s) Oral every 6 hours PRN  dextrose 40% Gel 15 Gram(s) Oral once PRN  glucagon  Injectable 1 milliGRAM(s) IntraMuscular once PRN      REVIEW OF SYSTEMS  --------------------------------------------------------------------------------  Gen: No  fevers/chills  Skin: No rashes  Head/Eyes/Ears/Mouth: No headache; Normal hearing; Normal vision w/o blurriness  Respiratory: No dyspnea, cough, wheezing, hemoptysis  CV: No chest pain, PND, orthopnea  GI: Diarrhea +  : No increased frequency, dysuria, hematuria, nocturia  MSK: No joint pain/swelling; no back pain; no edema  Neuro: No dizziness/lightheadedness, weakness, seizures, numbness, tingling    All other systems were reviewed and are negative, except as noted.    VITALS/PHYSICAL EXAM  --------------------------------------------------------------------------------  T(C): 37 (03-04-19 @ 10:17), Max: 37.1 (03-03-19 @ 21:07)  HR: 98 (03-04-19 @ 10:17) (76 - 98)  BP: 173/69 (03-04-19 @ 10:17) (146/71 - 173/69)  RR: 18 (03-04-19 @ 10:17) (17 - 18)  SpO2: 96% (03-04-19 @ 10:17) (96% - 99%)  Wt(kg): --        03-03-19 @ 07:01  -  03-04-19 @ 07:00  --------------------------------------------------------  IN: 1540 mL / OUT: 2700 mL / NET: -1160 mL    03-04-19 @ 07:01  -  03-04-19 @ 11:21  --------------------------------------------------------  IN: 0 mL / OUT: 275 mL / NET: -275 mL      Physical Exam:  	  Gen: NAD  	Pulm: CTA B/L  	CV: RRR, S1S2; no rub  	Abd: +BS, soft, nontender/nondistended; RLQ transplant no ttp   	LE: Warm, no edema  	Neuro: follows commands  	Psych: Normal affect and mood  	Skin: Warm, without rashes              Access: +YOMAIRA TRUJILLO    LABS/STUDIES  --------------------------------------------------------------------------------              9.4    6.0   >-----------<  246      [03-04-19 @ 06:04]              26.7     140  |  112  |  49  ----------------------------<  145      [03-04-19 @ 06:02]  4.3   |  16  |  3.29        Ca     9.0     [03-04-19 @ 06:02]      Mg     2.0     [03-04-19 @ 06:02]      Phos  3.6     [03-04-19 @ 06:02]    TPro  6.1  /  Alb  2.6  /  TBili  0.5  /  DBili  x   /  AST  25  /  ALT  31  /  AlkPhos  89  [03-04-19 @ 06:02]          Creatinine Trend:  SCr 3.29 [03-04 @ 06:02]  SCr 3.66 [03-03 @ 06:19]  SCr 4.48 [03-02 @ 06:08]  SCr 5.71 [03-01 @ 06:28]  SCr 6.28 [02-28 @ 06:57]    Urinalysis - [02-27-19 @ 18:18]      Color Orange / Appearance Turbid / SG 1.017 / pH 6.0      Gluc Negative / Ketone Negative  / Bili Negative / Urobili Negative       Blood Moderate / Protein 300 mg/dL / Leuk Est Large / Nitrite Negative      RBC  / WBC  / Hyaline  / Gran  / Sq Epi  / Non Sq Epi  / Bacteria       HbA1c 5.8      [02-28-19 @ 09:45]

## 2019-03-04 NOTE — PROGRESS NOTE ADULT - PROBLEM SELECTOR PLAN 1
- E Coli bacteremia and UTI  - Bld cx from 3/2 NGTD  - Continue meropenem until 3/5 and transition to Cipro 500mg daily x 1 week  - DC home today; f/u Wed - E Coli bacteremia and UTI  - Bld cx from 3/2 NGTD  - Continue meropenem until 3/5 and transition to Cipro 500mg daily x 1 week  - DC home tomorrow, f/u in clinic on Thursday

## 2019-03-04 NOTE — PROGRESS NOTE ADULT - SUBJECTIVE AND OBJECTIVE BOX
Transplant Surgery - Multidisciplinary Rounds  --------------------------------------------------------------  DDRT 2018         Readmitted   with E Coli bacteremia and UTI     Present:   Patient seen with multidisciplinary team including ( Transplant Surgeon:  Dr. Castaneda, Dr. Yao,  Transplant Nephrologist: Dr. Multani, Dr. Bhakta.  Pharmacist: Abhishek Harrell. Nurse Practitioner: Roxann Jorgensen,  MATY Lopez and MATY Harp in am rounds and examined with Dr. Yao.  Disciplines not in attendance will be notified of the plan.     HPI: 69 y/o M with PMHx of ESRD secondary to bilateral nephrectomy from neoplasm, HTN, and NIDDM status post HCV + DDRT () who's course was complicated by DGF requiring HD, ATN/mild rejection (2017), renal artery stenosis requiring stenting, and perinephric collection requiring drainage.  On 19 s/p ureteral stent removal.      Admitted with hypotension, weakness, and bacteremia on 19.  CT of chest/abdomen/pelvis + for cystitis, and transplant renal ultrasound w/ no acute findings. Blood and urine cx + E Coli. On meropenem.     Interval Events: Feeling better. Afebrile overnight. HTN'sive overnight     Potential Discharge date: dc home today    Education:  Medications    Plan of care:  See Below    MEDICATIONS  (STANDING):  atovaquone Suspension 750 milliGRAM(s) Oral two times a day  dextrose 50% Injectable 12.5 Gram(s) IV Push once  dextrose 50% Injectable 25 Gram(s) IV Push once  dextrose 50% Injectable 25 Gram(s) IV Push once  famotidine    Tablet 20 milliGRAM(s) Oral daily  gabapentin 300 milliGRAM(s) Oral daily  hemorrhoidal Ointment 1 Application(s) Rectal two times a day  heparin  Injectable 5000 Unit(s) SubCutaneous every 12 hours  insulin glargine Injectable (LANTUS) 6 Unit(s) SubCutaneous at bedtime  insulin lispro (HumaLOG) corrective regimen sliding scale   SubCutaneous three times a day before meals  insulin lispro (HumaLOG) corrective regimen sliding scale   SubCutaneous at bedtime  insulin lispro Injectable (HumaLOG) 3 Unit(s) SubCutaneous three times a day before meals  meropenem  IVPB 500 milliGRAM(s) IV Intermittent every 24 hours  meropenem  IVPB      predniSONE   Tablet 5 milliGRAM(s) Oral daily  sodium bicarbonate 1300 milliGRAM(s) Oral two times a day  tacrolimus 4.5 milliGRAM(s) Oral every 12 hours  tamsulosin 0.4 milliGRAM(s) Oral at bedtime    MEDICATIONS  (PRN):  acetaminophen   Tablet .. 650 milliGRAM(s) Oral every 6 hours PRN Temp greater or equal to 38C (100.4F), Mild Pain (1 - 3)  dextrose 40% Gel 15 Gram(s) Oral once PRN Blood Glucose LESS THAN 70 milliGRAM(s)/deciliter  glucagon  Injectable 1 milliGRAM(s) IntraMuscular once PRN Glucose LESS THAN 70 milligrams/deciliter      PAST MEDICAL & SURGICAL HISTORY:  Medial meniscus tear: &#x27; 90&#x27;s  Right  Bowel obstruction: &#x27; 2017   surgically treated  Anal fissure: dx: &#x27; 2018   No surgery  Benign prostatic hypertrophy  Hepatitis C: In Donor Kidney: patient received treatment for Hep. C : post treatment: patient  tested Negative for Hep C  Kidney neoplasm: dx: &#x27;  : Left      s/p bilateral Nephrectomy &#x27; 2015  ESRD (end stage renal disease): On Dialysis &#x27;  to 2018  Type 2 diabetes mellitus: dx: &#x27;88  HTN (hypertension)  Kidney transplant recipient: 2018  @ [facility] :  +  Hep C Donor  S/P right knee arthroscopy: &#x27; 90&#x27;s  H/O ileostomy: &#x27; 2017   for 3 months. 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      Vital Signs Last 24 Hrs  T(C): 36.7 (04 Mar 2019 05:06), Max: 37.1 (03 Mar 2019 21:07)  T(F): 98.1 (04 Mar 2019 05:06), Max: 98.8 (03 Mar 2019 21:07)  HR: 79 (04 Mar 2019 05:06) (76 - 88)  BP: 155/70 (04 Mar 2019 05:06) (145/78 - 165/75)  BP(mean): --  RR: 17 (04 Mar 2019 05:06) (17 - 18)  SpO2: 99% (04 Mar 2019 05:06) (99% - 99%)    I&O's Summary    03 Mar 2019 07:01  -  04 Mar 2019 07:00  --------------------------------------------------------  IN: 1540 mL / OUT: 2700 mL / NET: -1160 mL    04 Mar 2019 07:01  -  04 Mar 2019 09:38  --------------------------------------------------------  IN: 0 mL / OUT: 275 mL / NET: -275 mL                              9.4    6.0   )-----------( 246      ( 04 Mar 2019 06:04 )             26.7     03-04    140  |  112<H>  |  49<H>  ----------------------------<  145<H>  4.3   |  16<L>  |  3.29<H>    Ca    9.0      04 Mar 2019 06:02  Phos  3.6     03-04  Mg     2.0     03-04    TPro  6.1  /  Alb  2.6<L>  /  TBili  0.5  /  DBili  x   /  AST  25  /  ALT  31  /  AlkPhos  89  03-04    Tacrolimus (), Serum: 4.9 ng/mL ( @ 08:56)          Review of systems  Gen: No weight changes, fatigue, fevers/chills, weakness  Skin: No rashes  Head/Eyes/Ears/Mouth: No headache; Normal hearing; Normal vision w/o blurriness; No sinus pain/discomfort, sore throat  Respiratory: No dyspnea, cough, wheezing, hemoptysis  CV: No chest pain, PND, orthopnea  GI: C/O mild abdominal pain at surgical site, diarrhea, constipation, nausea, vomiting, melena, hematochezia  : No increased frequency, dysuria, hematuria, nocturia  MSK: No joint pain/swelling; no back pain; no edema  Neuro: No dizziness/lightheadedness, weakness, seizures, numbness, tingling  Heme: No easy bruising or bleeding  Endo: No heat/cold intolerance  Psych: No significant nervousness, anxiety, stress, depression  All other systems were reviewed and are negative, except as noted.      PHYSICAL EXAM:  Constitutional: Well developed / well nourished  Eyes: Anicteric, PERRLA  ENMT: nc/at  Neck: supple, no JVD  Respiratory: CTA B/L  Cardiovascular: RRR  Gastrointestinal: Soft abdomen, non tender, non distended   Genitourinary:  Voiding spontaneously  Extremities: SCD's in place and working bilaterally  Vascular: Palpable dp pulses bilaterally  Neurological: A&O x3  Skin: Well healed incision   Musculoskeletal: Moving all extremities  Psychiatric: Responsive Transplant Surgery - Multidisciplinary Rounds  --------------------------------------------------------------  DDRT 2018         Readmitted   with E Coli bacteremia and UTI     Present:   Patient seen with multidisciplinary team including ( Transplant Surgeon:  Dr. Castaneda, Dr. Yao,  Transplant Nephrologist: Dr. Multani, Dr. Bhakta.  Pharmacist: Abhishek Harrell. Nurse Practitioner: Roxann Jorgensen,  MATY Lopez and MATY Harp in am rounds and examined with Dr. Yao.  Disciplines not in attendance will be notified of the plan.     HPI: 67 y/o M with PMHx of ESRD secondary to bilateral nephrectomy from neoplasm, HTN, and NIDDM status post HCV + DDRT () who's course was complicated by DGF requiring HD, ATN/mild rejection (2017), renal artery stenosis requiring stenting, and perinephric collection requiring drainage.  On 19 s/p ureteral stent removal.      Admitted with hypotension, weakness, and bacteremia on 19.  CT of chest/abdomen/pelvis + for cystitis, and transplant renal ultrasound w/ no acute findings. Blood and urine cx + E Coli. On meropenem.     Interval Events: Feeling better. Afebrile overnight. HTN'sive overnight     Potential Discharge date: [place] home tomorrow     Education:  Medications    Plan of care:  See Below    MEDICATIONS  (STANDING):  atovaquone Suspension 750 milliGRAM(s) Oral two times a day  dextrose 50% Injectable 12.5 Gram(s) IV Push once  dextrose 50% Injectable 25 Gram(s) IV Push once  dextrose 50% Injectable 25 Gram(s) IV Push once  famotidine    Tablet 20 milliGRAM(s) Oral daily  gabapentin 300 milliGRAM(s) Oral daily  hemorrhoidal Ointment 1 Application(s) Rectal two times a day  heparin  Injectable 5000 Unit(s) SubCutaneous every 12 hours  insulin glargine Injectable (LANTUS) 6 Unit(s) SubCutaneous at bedtime  insulin lispro (HumaLOG) corrective regimen sliding scale   SubCutaneous three times a day before meals  insulin lispro (HumaLOG) corrective regimen sliding scale   SubCutaneous at bedtime  insulin lispro Injectable (HumaLOG) 3 Unit(s) SubCutaneous three times a day before meals  meropenem  IVPB 500 milliGRAM(s) IV Intermittent every 24 hours  meropenem  IVPB      predniSONE   Tablet 5 milliGRAM(s) Oral daily  sodium bicarbonate 1300 milliGRAM(s) Oral two times a day  tacrolimus 4.5 milliGRAM(s) Oral every 12 hours  tamsulosin 0.4 milliGRAM(s) Oral at bedtime    MEDICATIONS  (PRN):  acetaminophen   Tablet .. 650 milliGRAM(s) Oral every 6 hours PRN Temp greater or equal to 38C (100.4F), Mild Pain (1 - 3)  dextrose 40% Gel 15 Gram(s) Oral once PRN Blood Glucose LESS THAN 70 milliGRAM(s)/deciliter  glucagon  Injectable 1 milliGRAM(s) IntraMuscular once PRN Glucose LESS THAN 70 milligrams/deciliter      PAST MEDICAL & SURGICAL HISTORY:  Medial meniscus tear: &#x27; 90&#x27;s  Right  Bowel obstruction: &#x27; 2017   surgically treated  Anal fissure: dx: &#x27; 2018   No surgery  Benign prostatic hypertrophy  Hepatitis C: In Donor Kidney: patient received treatment for Hep. C : post treatment: patient  tested Negative for Hep C  Kidney neoplasm: dx: &#x27;  : Left      s/p bilateral Nephrectomy &#x27; 2015  ESRD (end stage renal disease): On Dialysis &#x27;  to 2018  Type 2 diabetes mellitus: dx: &#x27;88  HTN (hypertension)  Kidney transplant recipient: 2018  @ [facility] :  +  Hep C Donor  S/P right knee arthroscopy: &#x27; 90&#x27;s  H/O ileostomy: &#x27; 2017   for 3 months. 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      Vital Signs Last 24 Hrs  T(C): 36.7 (04 Mar 2019 05:06), Max: 37.1 (03 Mar 2019 21:07)  T(F): 98.1 (04 Mar 2019 05:06), Max: 98.8 (03 Mar 2019 21:07)  HR: 79 (04 Mar 2019 05:06) (76 - 88)  BP: 155/70 (04 Mar 2019 05:06) (145/78 - 165/75)  BP(mean): --  RR: 17 (04 Mar 2019 05:06) (17 - 18)  SpO2: 99% (04 Mar 2019 05:06) (99% - 99%)    I&O's Summary    03 Mar 2019 07:01  -  04 Mar 2019 07:00  --------------------------------------------------------  IN: 1540 mL / OUT: 2700 mL / NET: -1160 mL    04 Mar 2019 07:01  -  04 Mar 2019 09:38  --------------------------------------------------------  IN: 0 mL / OUT: 275 mL / NET: -275 mL                              9.4    6.0   )-----------( 246      ( 04 Mar 2019 06:04 )             26.7     03-04    140  |  112<H>  |  49<H>  ----------------------------<  145<H>  4.3   |  16<L>  |  3.29<H>    Ca    9.0      04 Mar 2019 06:02  Phos  3.6     03-04  Mg     2.0     03-04    TPro  6.1  /  Alb  2.6<L>  /  TBili  0.5  /  DBili  x   /  AST  25  /  ALT  31  /  AlkPhos  89  03-04    Tacrolimus (), Serum: 4.9 ng/mL ( @ 08:56)          Review of systems  Gen: No weight changes, fatigue, fevers/chills, weakness  Skin: No rashes  Head/Eyes/Ears/Mouth: No headache; Normal hearing; Normal vision w/o blurriness; No sinus pain/discomfort, sore throat  Respiratory: No dyspnea, cough, wheezing, hemoptysis  CV: No chest pain, PND, orthopnea  GI: C/O mild abdominal pain at surgical site, diarrhea, constipation, nausea, vomiting, melena, hematochezia  : No increased frequency, dysuria, hematuria, nocturia  MSK: No joint pain/swelling; no back pain; no edema  Neuro: No dizziness/lightheadedness, weakness, seizures, numbness, tingling  Heme: No easy bruising or bleeding  Endo: No heat/cold intolerance  Psych: No significant nervousness, anxiety, stress, depression  All other systems were reviewed and are negative, except as noted.      PHYSICAL EXAM:  Constitutional: Well developed / well nourished  Eyes: Anicteric, PERRLA  ENMT: nc/at  Neck: supple, no JVD  Respiratory: CTA B/L  Cardiovascular: RRR  Gastrointestinal: Soft abdomen, non tender, non distended   Genitourinary:  Voiding spontaneously  Extremities: SCD's in place and working bilaterally  Vascular: Palpable dp pulses bilaterally  Neurological: A&O x3  Skin: Well healed incision   Musculoskeletal: Moving all extremities  Psychiatric: Responsive

## 2019-03-04 NOTE — PROGRESS NOTE ADULT - ASSESSMENT
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  s/p ureteral stent removal on 2/19/19. Admitted with bacteremia.

## 2019-03-04 NOTE — PROGRESS NOTE ADULT - PROBLEM SELECTOR PLAN 1
Patient with CHELA, likely hemodynamically mediated in setting of hypotension and sepsis from UTI. Scr. was 5.8 on admission(2/27/19), peaked at 6.28 on 2/28/19, and has improved to 3.29 today. Patient is non oliguric. Continue with antibiotics for UTI. Monitor Scr, I/O and electrolytes. Avoid NSAIDs, RCAs, and other nephrotoxins.

## 2019-03-05 ENCOUNTER — TRANSCRIPTION ENCOUNTER (OUTPATIENT)
Age: 69
End: 2019-03-05

## 2019-03-05 VITALS
RESPIRATION RATE: 18 BRPM | OXYGEN SATURATION: 98 % | SYSTOLIC BLOOD PRESSURE: 152 MMHG | HEART RATE: 107 BPM | DIASTOLIC BLOOD PRESSURE: 73 MMHG | TEMPERATURE: 98 F

## 2019-03-05 LAB
ALBUMIN SERPL ELPH-MCNC: 2.4 G/DL — LOW (ref 3.3–5)
ALP SERPL-CCNC: 100 U/L — SIGNIFICANT CHANGE UP (ref 40–120)
ALT FLD-CCNC: 35 U/L — SIGNIFICANT CHANGE UP (ref 10–45)
ANION GAP SERPL CALC-SCNC: 12 MMOL/L — SIGNIFICANT CHANGE UP (ref 5–17)
AST SERPL-CCNC: 28 U/L — SIGNIFICANT CHANGE UP (ref 10–40)
BASOPHILS # BLD AUTO: 0 K/UL — SIGNIFICANT CHANGE UP (ref 0–0.2)
BASOPHILS NFR BLD AUTO: 0.1 % — SIGNIFICANT CHANGE UP (ref 0–2)
BILIRUB SERPL-MCNC: 0.3 MG/DL — SIGNIFICANT CHANGE UP (ref 0.2–1.2)
BUN SERPL-MCNC: 40 MG/DL — HIGH (ref 7–23)
CALCIUM SERPL-MCNC: 8.9 MG/DL — SIGNIFICANT CHANGE UP (ref 8.4–10.5)
CHLORIDE SERPL-SCNC: 111 MMOL/L — HIGH (ref 96–108)
CO2 SERPL-SCNC: 17 MMOL/L — LOW (ref 22–31)
CREAT SERPL-MCNC: 2.73 MG/DL — HIGH (ref 0.5–1.3)
EOSINOPHIL # BLD AUTO: 0.1 K/UL — SIGNIFICANT CHANGE UP (ref 0–0.5)
EOSINOPHIL NFR BLD AUTO: 2.5 % — SIGNIFICANT CHANGE UP (ref 0–6)
GLUCOSE SERPL-MCNC: 240 MG/DL — HIGH (ref 70–99)
HCT VFR BLD CALC: 27.2 % — LOW (ref 39–50)
HGB BLD-MCNC: 9.1 G/DL — LOW (ref 13–17)
LYMPHOCYTES # BLD AUTO: 0.9 K/UL — LOW (ref 1–3.3)
LYMPHOCYTES # BLD AUTO: 16 % — SIGNIFICANT CHANGE UP (ref 13–44)
MAGNESIUM SERPL-MCNC: 1.9 MG/DL — SIGNIFICANT CHANGE UP (ref 1.6–2.6)
MCHC RBC-ENTMCNC: 32.1 PG — SIGNIFICANT CHANGE UP (ref 27–34)
MCHC RBC-ENTMCNC: 33.4 GM/DL — SIGNIFICANT CHANGE UP (ref 32–36)
MCV RBC AUTO: 95.9 FL — SIGNIFICANT CHANGE UP (ref 80–100)
MONOCYTES # BLD AUTO: 0.7 K/UL — SIGNIFICANT CHANGE UP (ref 0–0.9)
MONOCYTES NFR BLD AUTO: 12.2 % — SIGNIFICANT CHANGE UP (ref 2–14)
NEUTROPHILS # BLD AUTO: 4 K/UL — SIGNIFICANT CHANGE UP (ref 1.8–7.4)
NEUTROPHILS NFR BLD AUTO: 69.1 % — SIGNIFICANT CHANGE UP (ref 43–77)
PHOSPHATE SERPL-MCNC: 3.2 MG/DL — SIGNIFICANT CHANGE UP (ref 2.5–4.5)
PLATELET # BLD AUTO: 283 K/UL — SIGNIFICANT CHANGE UP (ref 150–400)
POTASSIUM SERPL-MCNC: 4.1 MMOL/L — SIGNIFICANT CHANGE UP (ref 3.5–5.3)
POTASSIUM SERPL-SCNC: 4.1 MMOL/L — SIGNIFICANT CHANGE UP (ref 3.5–5.3)
PROT SERPL-MCNC: 6 G/DL — SIGNIFICANT CHANGE UP (ref 6–8.3)
RBC # BLD: 2.84 M/UL — LOW (ref 4.2–5.8)
RBC # FLD: 14.1 % — SIGNIFICANT CHANGE UP (ref 10.3–14.5)
SODIUM SERPL-SCNC: 140 MMOL/L — SIGNIFICANT CHANGE UP (ref 135–145)
TACROLIMUS SERPL-MCNC: 6.5 NG/ML — SIGNIFICANT CHANGE UP
WBC # BLD: 5.8 K/UL — SIGNIFICANT CHANGE UP (ref 3.8–10.5)
WBC # FLD AUTO: 5.8 K/UL — SIGNIFICANT CHANGE UP (ref 3.8–10.5)

## 2019-03-05 PROCEDURE — 99232 SBSQ HOSP IP/OBS MODERATE 35: CPT | Mod: GC

## 2019-03-05 PROCEDURE — 97161 PT EVAL LOW COMPLEX 20 MIN: CPT

## 2019-03-05 PROCEDURE — 80197 ASSAY OF TACROLIMUS: CPT

## 2019-03-05 PROCEDURE — 85027 COMPLETE CBC AUTOMATED: CPT

## 2019-03-05 PROCEDURE — 87581 M.PNEUMON DNA AMP PROBE: CPT

## 2019-03-05 PROCEDURE — 87150 DNA/RNA AMPLIFIED PROBE: CPT

## 2019-03-05 PROCEDURE — 93005 ELECTROCARDIOGRAM TRACING: CPT

## 2019-03-05 PROCEDURE — 76776 US EXAM K TRANSPL W/DOPPLER: CPT

## 2019-03-05 PROCEDURE — 87086 URINE CULTURE/COLONY COUNT: CPT

## 2019-03-05 PROCEDURE — 76937 US GUIDE VASCULAR ACCESS: CPT

## 2019-03-05 PROCEDURE — 74176 CT ABD & PELVIS W/O CONTRAST: CPT

## 2019-03-05 PROCEDURE — 82962 GLUCOSE BLOOD TEST: CPT

## 2019-03-05 PROCEDURE — 84100 ASSAY OF PHOSPHORUS: CPT

## 2019-03-05 PROCEDURE — 87449 NOS EACH ORGANISM AG IA: CPT

## 2019-03-05 PROCEDURE — 82435 ASSAY OF BLOOD CHLORIDE: CPT

## 2019-03-05 PROCEDURE — 87493 C DIFF AMPLIFIED PROBE: CPT

## 2019-03-05 PROCEDURE — 82330 ASSAY OF CALCIUM: CPT

## 2019-03-05 PROCEDURE — 36000 PLACE NEEDLE IN VEIN: CPT | Mod: RT

## 2019-03-05 PROCEDURE — 81001 URINALYSIS AUTO W/SCOPE: CPT

## 2019-03-05 PROCEDURE — 80053 COMPREHEN METABOLIC PANEL: CPT

## 2019-03-05 PROCEDURE — 83605 ASSAY OF LACTIC ACID: CPT

## 2019-03-05 PROCEDURE — 82947 ASSAY GLUCOSE BLOOD QUANT: CPT

## 2019-03-05 PROCEDURE — 87186 SC STD MICRODIL/AGAR DIL: CPT

## 2019-03-05 PROCEDURE — 71250 CT THORAX DX C-: CPT

## 2019-03-05 PROCEDURE — 87798 DETECT AGENT NOS DNA AMP: CPT

## 2019-03-05 PROCEDURE — 84295 ASSAY OF SERUM SODIUM: CPT

## 2019-03-05 PROCEDURE — 99239 HOSP IP/OBS DSCHRG MGMT >30: CPT

## 2019-03-05 PROCEDURE — 83036 HEMOGLOBIN GLYCOSYLATED A1C: CPT

## 2019-03-05 PROCEDURE — 84132 ASSAY OF SERUM POTASSIUM: CPT

## 2019-03-05 PROCEDURE — 85730 THROMBOPLASTIN TIME PARTIAL: CPT

## 2019-03-05 PROCEDURE — 83735 ASSAY OF MAGNESIUM: CPT

## 2019-03-05 PROCEDURE — 99285 EMERGENCY DEPT VISIT HI MDM: CPT | Mod: 25

## 2019-03-05 PROCEDURE — 87177 OVA AND PARASITES SMEARS: CPT

## 2019-03-05 PROCEDURE — 82803 BLOOD GASES ANY COMBINATION: CPT

## 2019-03-05 PROCEDURE — 80202 ASSAY OF VANCOMYCIN: CPT

## 2019-03-05 PROCEDURE — 87040 BLOOD CULTURE FOR BACTERIA: CPT

## 2019-03-05 PROCEDURE — 87324 CLOSTRIDIUM AG IA: CPT

## 2019-03-05 PROCEDURE — 87633 RESP VIRUS 12-25 TARGETS: CPT

## 2019-03-05 PROCEDURE — 87486 CHLMYD PNEUM DNA AMP PROBE: CPT

## 2019-03-05 PROCEDURE — 85610 PROTHROMBIN TIME: CPT

## 2019-03-05 PROCEDURE — 85014 HEMATOCRIT: CPT

## 2019-03-05 RX ORDER — SODIUM BICARBONATE 1 MEQ/ML
2 SYRINGE (ML) INTRAVENOUS
Qty: 0 | Refills: 0 | DISCHARGE
Start: 2019-03-05

## 2019-03-05 RX ORDER — CALCIUM CARBONATE 500(1250)
1 TABLET ORAL
Qty: 0 | Refills: 0 | COMMUNITY

## 2019-03-05 RX ORDER — SITAGLIPTIN 50 MG/1
2 TABLET, FILM COATED ORAL
Qty: 0 | Refills: 0 | COMMUNITY

## 2019-03-05 RX ORDER — TACROLIMUS 5 MG/1
4 CAPSULE ORAL
Qty: 0 | Refills: 0 | COMMUNITY

## 2019-03-05 RX ORDER — CALCIUM CARBONATE 500(1250)
1 TABLET ORAL
Qty: 0 | Refills: 0 | COMMUNITY
Start: 2019-03-05

## 2019-03-05 RX ORDER — SODIUM BICARBONATE 1 MEQ/ML
2 SYRINGE (ML) INTRAVENOUS
Qty: 120 | Refills: 0 | OUTPATIENT
Start: 2019-03-05 | End: 2019-04-03

## 2019-03-05 RX ORDER — CHLORHEXIDINE GLUCONATE 213 G/1000ML
1 SOLUTION TOPICAL
Qty: 0 | Refills: 0 | Status: DISCONTINUED | OUTPATIENT
Start: 2019-03-05 | End: 2019-03-05

## 2019-03-05 RX ORDER — PHENYLEPHRINE-SHARK LIVER OIL-MINERAL OIL-PETROLATUM RECTAL OINTMENT
1 OINTMENT (GRAM) RECTAL
Qty: 0 | Refills: 0 | DISCHARGE
Start: 2019-03-05

## 2019-03-05 RX ORDER — MYCOPHENOLATE MOFETIL 250 MG/1
1 CAPSULE ORAL
Qty: 0 | Refills: 0 | COMMUNITY

## 2019-03-05 RX ORDER — MOXIFLOXACIN HYDROCHLORIDE TABLETS, 400 MG 400 MG/1
1 TABLET, FILM COATED ORAL
Qty: 7 | Refills: 0
Start: 2019-03-05 | End: 2019-03-11

## 2019-03-05 RX ORDER — SODIUM BICARBONATE 1 MEQ/ML
2 SYRINGE (ML) INTRAVENOUS
Qty: 120 | Refills: 0
Start: 2019-03-05 | End: 2019-04-03

## 2019-03-05 RX ORDER — CALCIUM CARBONATE 500(1250)
1 TABLET ORAL
Qty: 0 | Refills: 0 | DISCHARGE
Start: 2019-03-05

## 2019-03-05 RX ORDER — TACROLIMUS 5 MG/1
1 CAPSULE ORAL
Qty: 0 | Refills: 0 | COMMUNITY

## 2019-03-05 RX ADMIN — ATOVAQUONE 750 MILLIGRAM(S): 750 SUSPENSION ORAL at 05:52

## 2019-03-05 RX ADMIN — Medication 5 MILLIGRAM(S): at 05:53

## 2019-03-05 RX ADMIN — Medication 3 UNIT(S): at 08:16

## 2019-03-05 RX ADMIN — Medication 3 UNIT(S): at 12:36

## 2019-03-05 RX ADMIN — Medication 2: at 08:16

## 2019-03-05 RX ADMIN — GABAPENTIN 300 MILLIGRAM(S): 400 CAPSULE ORAL at 11:19

## 2019-03-05 RX ADMIN — Medication 1300 MILLIGRAM(S): at 05:52

## 2019-03-05 RX ADMIN — MEROPENEM 100 MILLIGRAM(S): 1 INJECTION INTRAVENOUS at 08:17

## 2019-03-05 RX ADMIN — Medication 30 MILLIGRAM(S): at 05:53

## 2019-03-05 RX ADMIN — Medication 1 TABLET(S): at 05:53

## 2019-03-05 RX ADMIN — Medication 4: at 12:36

## 2019-03-05 RX ADMIN — HEPARIN SODIUM 5000 UNIT(S): 5000 INJECTION INTRAVENOUS; SUBCUTANEOUS at 05:53

## 2019-03-05 RX ADMIN — CHLORHEXIDINE GLUCONATE 1 APPLICATION(S): 213 SOLUTION TOPICAL at 05:58

## 2019-03-05 RX ADMIN — PHENYLEPHRINE-SHARK LIVER OIL-MINERAL OIL-PETROLATUM RECTAL OINTMENT 1 APPLICATION(S): at 08:19

## 2019-03-05 RX ADMIN — Medication 1 TABLET(S): at 12:36

## 2019-03-05 RX ADMIN — FAMOTIDINE 20 MILLIGRAM(S): 10 INJECTION INTRAVENOUS at 11:19

## 2019-03-05 RX ADMIN — TACROLIMUS 4.5 MILLIGRAM(S): 5 CAPSULE ORAL at 05:52

## 2019-03-05 NOTE — PROGRESS NOTE ADULT - PROBLEM SELECTOR PROBLEM 3
Diarrhea
HTN (hypertension)
Immunosuppression
Kidney transplant recipient
Immunosuppression

## 2019-03-05 NOTE — PROGRESS NOTE ADULT - ATTENDING COMMENTS
doing well. creatinine continue to improve . discharge home today on po antibiotics. immunosuppression fk and steroids.
feels much better and improving kidney function. continue iv antibiotics. immunosuppression fk and steroids. mmf on hold
urosepsis. feels better, creatinine trending down. continue iv antibiotics. immunosuppression fk and steroids. mmf on hold. plan dishcarge on monday
GNR in urine and blood. feels better. on meropenam. mmf on hold. creatinine today slightly better. will continue hydration and iv antibiotics
Patient with UTI, Diarrhea, CHELA  Reviewed renal allograft function and immunosuppression  Discussed plan of care, stool studies, antibitics and imaging available  I was present during and reviewed clinical and lab data as well as assessment and plan as documented by the housestaff as noted. Please contact if any additional questions with any change in clinical condition or on availability of any additional information or reports.
agree with above. doing well. creatinine trending down. iv antibiotics for one more day. immunosuppression fk and steroids.
adm with likely UTI.  Bladder thickening on CT.    on abx    immuno: pred (tac/MMF held)
Kidney recipient with CHELA, diarrhea, improving creatinine, bacteremia, UTI  Reviewed immunosuppression and allograft function:  Plan:  Restart lower dose of Tac, continue prednisone  Monitor for allograft function recovery, if none, repeat imaging  I was present during and reviewed clinical and lab data as well as assessment and plan as documented by the housestaff as noted. Please contact if any additional questions with any change in clinical condition or on availability of any additional information or reports.
Pt feeling better  d/c home today on PO cipro  At home pt to resume oral hypoglycemics for diabetes.
Pt feeling better  Possible discharge with home antibiotic tomorrow to complete 7 days IV then change to PO cipro  At home pt to resume oral hypoglycemics for diabetes.

## 2019-03-05 NOTE — PROGRESS NOTE ADULT - PROBLEM SELECTOR PLAN 2
in setting of renal failure and diarrhea. Latest serum CO2 noted to be low but stable at 17. Continue with sodium bicarbonate 1300 mg BID. Monitor serum CO2 level.

## 2019-03-05 NOTE — PROGRESS NOTE ADULT - SUBJECTIVE AND OBJECTIVE BOX
[facility] Division of Kidney Diseases & Hypertension  FOLLOW UP NOTE  [phone]--------------------------------------------------------------------------------  Chief Complaint: CHELA    24 hour events/subjective:    Patient seen and examined in AM  Denies c/o SOB, CP, abdominal pain, dysuria, nausea, vomiting.     PAST HISTORY  --------------------------------------------------------------------------------  No significant changes to PMH, PSH, FHx, SHx, unless otherwise noted    ALLERGIES & MEDICATIONS  --------------------------------------------------------------------------------  Allergies    No Known Allergies    Intolerances      Standing Inpatient Medications  atovaquone Suspension 750 milliGRAM(s) Oral two times a day  calcium carbonate    500 mG (Tums) Chewable 1 Tablet(s) Chew three times a day  chlorhexidine 4% Liquid 1 Application(s) Topical <User Schedule>  dextrose 50% Injectable 12.5 Gram(s) IV Push once  dextrose 50% Injectable 25 Gram(s) IV Push once  dextrose 50% Injectable 25 Gram(s) IV Push once  famotidine    Tablet 20 milliGRAM(s) Oral daily  gabapentin 300 milliGRAM(s) Oral daily  hemorrhoidal Ointment 1 Application(s) Rectal two times a day  heparin  Injectable 5000 Unit(s) SubCutaneous every 12 hours  insulin glargine Injectable (LANTUS) 6 Unit(s) SubCutaneous at bedtime  insulin lispro (HumaLOG) corrective regimen sliding scale   SubCutaneous three times a day before meals  insulin lispro (HumaLOG) corrective regimen sliding scale   SubCutaneous at bedtime  insulin lispro Injectable (HumaLOG) 3 Unit(s) SubCutaneous three times a day before meals  meropenem  IVPB 500 milliGRAM(s) IV Intermittent every 24 hours  meropenem  IVPB      NIFEdipine XL 30 milliGRAM(s) Oral daily  predniSONE   Tablet 5 milliGRAM(s) Oral daily  sodium bicarbonate 1300 milliGRAM(s) Oral two times a day  tacrolimus 4.5 milliGRAM(s) Oral every 12 hours  tamsulosin 0.4 milliGRAM(s) Oral at bedtime    PRN Inpatient Medications  acetaminophen   Tablet .. 650 milliGRAM(s) Oral every 6 hours PRN  dextrose 40% Gel 15 Gram(s) Oral once PRN  glucagon  Injectable 1 milliGRAM(s) IntraMuscular once PRN      REVIEW OF SYSTEMS  --------------------------------------------------------------------------------  Gen: No  fevers/chills  Skin: No rashes  Head/Eyes/Ears/Mouth: No headache; Normal hearing; Normal vision w/o blurriness  Respiratory: No dyspnea, cough, wheezing, hemoptysis  CV: No chest pain, PND, orthopnea  GI: No abdominal pain, diarrhea, constipation, nausea, vomiting  : No increased frequency, dysuria, hematuria, nocturia  MSK: No joint pain/swelling; no back pain; no edema  Neuro: No dizziness/lightheadedness, weakness, seizures, numbness, tingling      All other systems were reviewed and are negative, except as noted.    VITALS/PHYSICAL EXAM  --------------------------------------------------------------------------------  T(C): 36.9 (03-05-19 @ 10:00), Max: 37.1 (03-04-19 @ 20:52)  HR: 88 (03-05-19 @ 10:00) (77 - 91)  BP: 155/77 (03-05-19 @ 10:00) (137/67 - 161/79)  RR: 18 (03-05-19 @ 10:00) (18 - 18)  SpO2: 100% (03-05-19 @ 10:00) (97% - 100%)  Wt(kg): --        03-04-19 @ 07:01  -  03-05-19 @ 07:00  --------------------------------------------------------  IN: 480 mL / OUT: 450 mL / NET: 30 mL    03-05-19 @ 07:01  -  03-05-19 @ 13:07  --------------------------------------------------------  IN: 100 mL / OUT: 350 mL / NET: -250 mL      Physical Exam:  	  Gen: NAD  	Pulm: CTA B/L  	CV: RRR, S1S2; no rub  	Abd: +BS, soft, nontender/nondistended; RLQ transplant no ttp   	LE: Warm, no edema  	Neuro: follows commands  	Psych: Normal affect and mood  	Skin: Warm, without rashes              Access: +YOMAIRA TRUJILLO    LABS/STUDIES  --------------------------------------------------------------------------------              9.1    5.8   >-----------<  283      [03-05-19 @ 05:34]              27.2     140  |  111  |  40  ----------------------------<  240      [03-05-19 @ 05:34]  4.1   |  17  |  2.73        Ca     8.9     [03-05-19 @ 05:34]      Mg     1.9     [03-05-19 @ 05:34]      Phos  3.2     [03-05-19 @ 05:34]    TPro  6.0  /  Alb  2.4  /  TBili  0.3  /  DBili  x   /  AST  28  /  ALT  35  /  AlkPhos  100  [03-05-19 @ 05:34]          Creatinine Trend:  SCr 2.73 [03-05 @ 05:34]  SCr 3.29 [03-04 @ 06:02]  SCr 3.66 [03-03 @ 06:19]  SCr 4.48 [03-02 @ 06:08]  SCr 5.71 [03-01 @ 06:28]    Urinalysis - [02-27-19 @ 18:18]      Color Orange / Appearance Turbid / SG 1.017 / pH 6.0      Gluc Negative / Ketone Negative  / Bili Negative / Urobili Negative       Blood Moderate / Protein 300 mg/dL / Leuk Est Large / Nitrite Negative      RBC  / WBC  / Hyaline  / Gran  / Sq Epi  / Non Sq Epi  / Bacteria       HbA1c 5.8      [02-28-19 @ 09:45]

## 2019-03-05 NOTE — PROGRESS NOTE ADULT - PROBLEM SELECTOR PROBLEM 2
Kidney transplant recipient
Metabolic acidosis
Kidney transplant recipient

## 2019-03-05 NOTE — DISCHARGE NOTE PROVIDER - PROVIDER TOKENS
PROVIDER:[TOKEN:[[id number]],FOLLOWUP:[1 week]],PROVIDER:[TOKEN:[[id number]],FOLLOWUP:[1 week]]

## 2019-03-05 NOTE — DISCHARGE NOTE NURSING/CASE MANAGEMENT/SOCIAL WORK - NSDCDPATPORTLINK_GEN_ALL_CORE
You can access the [facility] Patient Portal, offered by [facility], by registering with the following website: [email or web address]

## 2019-03-05 NOTE — PROGRESS NOTE ADULT - SUBJECTIVE AND OBJECTIVE BOX
Transplant Surgery - Multidisciplinary Rounds  --------------------------------------------------------------  DDRT 2018         Readmitted   with E Coli bacteremia and UTI     Present:  Patient seen with multidisciplinary team including ( Transplant Surgeon: Dr. Russo, Dr. Castaneda, Dr. Yao, Dr. Heath, Transplant Nephrologist: Dr. Multani, Dr. Bhakta, Transplant Social Work: Christen Coon (Living Donor SW/ELEN) Pharmacist: Abhishek Harrell, Nutrition: Annette Villanueva, Nurse Practitioners: Roxann Jorgensen, Silva William, and Brielle Pimentel in am rounds and examined with Dr. Yao Disciplines not in attendance will be notified of the plan.       Interval Events: Feeling well, no complaints this AM. Reports fewer episodes of diarrhea, and states that stool is somewhat formed now. Afebrile w/ stable vitals overnight. Cr downtrending.     Potential Discharge date: 3/5/18    Education:  Medications    Plan of care:  See below    MEDICATIONS  (STANDING):  atovaquone Suspension 750 milliGRAM(s) Oral two times a day  calcium carbonate    500 mG (Tums) Chewable 1 Tablet(s) Chew three times a day  chlorhexidine 4% Liquid 1 Application(s) Topical <User Schedule>  dextrose 50% Injectable 12.5 Gram(s) IV Push once  dextrose 50% Injectable 25 Gram(s) IV Push once  dextrose 50% Injectable 25 Gram(s) IV Push once  famotidine    Tablet 20 milliGRAM(s) Oral daily  gabapentin 300 milliGRAM(s) Oral daily  hemorrhoidal Ointment 1 Application(s) Rectal two times a day  heparin  Injectable 5000 Unit(s) SubCutaneous every 12 hours  insulin glargine Injectable (LANTUS) 6 Unit(s) SubCutaneous at bedtime  insulin lispro (HumaLOG) corrective regimen sliding scale   SubCutaneous three times a day before meals  insulin lispro (HumaLOG) corrective regimen sliding scale   SubCutaneous at bedtime  insulin lispro Injectable (HumaLOG) 3 Unit(s) SubCutaneous three times a day before meals  meropenem  IVPB 500 milliGRAM(s) IV Intermittent every 24 hours  meropenem  IVPB      NIFEdipine XL 30 milliGRAM(s) Oral daily  predniSONE   Tablet 5 milliGRAM(s) Oral daily  sodium bicarbonate 1300 milliGRAM(s) Oral two times a day  tacrolimus 4.5 milliGRAM(s) Oral every 12 hours  tamsulosin 0.4 milliGRAM(s) Oral at bedtime    MEDICATIONS  (PRN):  acetaminophen   Tablet .. 650 milliGRAM(s) Oral every 6 hours PRN Temp greater or equal to 38C (100.4F), Mild Pain (1 - 3)  dextrose 40% Gel 15 Gram(s) Oral once PRN Blood Glucose LESS THAN 70 milliGRAM(s)/deciliter  glucagon  Injectable 1 milliGRAM(s) IntraMuscular once PRN Glucose LESS THAN 70 milligrams/deciliter      PAST MEDICAL & SURGICAL HISTORY:  Medial meniscus tear: &#x27; 90&#x27;s  Right  Bowel obstruction: &#x27; 2017   surgically treated  Anal fissure: dx: &#x27; 2018   No surgery  Benign prostatic hypertrophy  Hepatitis C: In Donor Kidney: patient received treatment for Hep. C : post treatment: patient  tested Negative for Hep C  Kidney neoplasm: dx: &#x27;  : Left      s/p bilateral Nephrectomy &#x27; 2015  ESRD (end stage renal disease): On Dialysis &#x27;  to 2018  Type 2 diabetes mellitus: dx: &#x27;88  HTN (hypertension)  Kidney transplant recipient: 2018  @ [facility] :  +  Hep C Donor  S/P right knee arthroscopy: &#x27; 90&#x27;s  H/O ileostomy: &#x27; 2017   for 3 months. 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      Vital Signs Last 24 Hrs  T(C): 36.9 (05 Mar 2019 10:00), Max: 37.1 (04 Mar 2019 20:52)  T(F): 98.5 (05 Mar 2019 10:00), Max: 98.7 (04 Mar 2019 20:52)  HR: 88 (05 Mar 2019 10:00) (77 - 91)  BP: 155/77 (05 Mar 2019 10:00) (137/67 - 161/79)  BP(mean): --  RR: 18 (05 Mar 2019 10:00) (18 - 18)  SpO2: 100% (05 Mar 2019 10:00) (97% - 100%)    I&O's Summary    04 Mar 2019 07:01  -  05 Mar 2019 07:00  --------------------------------------------------------  IN: 480 mL / OUT: 450 mL / NET: 30 mL    05 Mar 2019 07:01  -  05 Mar 2019 11:42  --------------------------------------------------------  IN: 0 mL / OUT: 150 mL / NET: -150 mL                              9.1    5.8   )-----------( 283      ( 05 Mar 2019 05:34 )             27.2     03-05    140  |  111<H>  |  40<H>  ----------------------------<  240<H>  4.1   |  17<L>  |  2.73<H>    Ca    8.9      05 Mar 2019 05:34  Phos  3.2     03-05  Mg     1.9     03-05    TPro  6.0  /  Alb  2.4<L>  /  TBili  0.3  /  DBili  x   /  AST  28  /  ALT  35  /  AlkPhos  100  03-05    Tacrolimus (), Serum: 6.5 ng/mL ( @ 07:53)          Review of systems  Gen: No weight changes, fatigue, fevers/chills, weakness  Skin: No rashes  Head/Eyes/Ears/Mouth: No headache; Normal hearing; Normal vision w/o blurriness; No sinus pain/discomfort, sore throat  Respiratory: No dyspnea, cough, wheezing, hemoptysis  CV: No chest pain, PND, orthopnea  GI: C/O mild abdominal pain at surgical site, diarrhea, constipation, nausea, vomiting, melena, hematochezia  : No increased frequency, dysuria, hematuria, nocturia  MSK: No joint pain/swelling; no back pain; no edema  Neuro: No dizziness/lightheadedness, weakness, seizures, numbness, tingling  Heme: No easy bruising or bleeding  Endo: No heat/cold intolerance  Psych: No significant nervousness, anxiety, stress, depression  All other systems were reviewed and are negative, except as noted.      PHYSICAL EXAM:  Constitutional: Well developed / well nourished  Eyes: Anicteric, PERRLA  ENMT: nc/at  Neck: supple, no JVD  Respiratory: CTA B/L  Cardiovascular: RRR  Gastrointestinal: Soft abdomen, non tender, non distended   Genitourinary:  Voiding spontaneously  Extremities: SCD's in place and working bilaterally  Vascular: Palpable dp pulses bilaterally  Neurological: A&O x3  Skin: Well healed incision   Musculoskeletal: Moving all extremities  Psychiatric: Responsive

## 2019-03-05 NOTE — PROGRESS NOTE ADULT - PROBLEM SELECTOR PROBLEM 1
Bacteremia due to Escherichia coli
CHELA (acute kidney injury)
Sepsis due to Gram negative bacteria

## 2019-03-05 NOTE — DISCHARGE NOTE PROVIDER - CARE PROVIDER_API CALL
Maxwell Yao (MD)  Surgery  [address] [place] [address]  Phone: [phone]  Fax: [phone]  Follow Up Time: 1 week    Arian Bhakta (DO)  Nephrology  [address] [place] [address]  Phone: [phone]  Fax: [phone]  Follow Up Time: 1 week

## 2019-03-05 NOTE — PROGRESS NOTE ADULT - PROBLEM SELECTOR PLAN 5
Urine cultures and blood cultures done on 2/27 showed growth of E.coli. Patient completed 1 week course of IV antibiotics with cefepime and meropenem. Repeat blood cultures are negative and patient is afebrile. Recommend to continue with PO ciprofloxacin for 1 week.

## 2019-03-05 NOTE — PROGRESS NOTE ADULT - PROBLEM SELECTOR PLAN 3
- Resume Nifedipine Xl 30mg daily
- Stool studies pending  - c diff ordered  - CMV PCR pending   - MMF on hold in setting of bacteremia
- Stool studies this far negative  - c diff indeterminate  - CMV PCR pending   - MMF on hold in setting of bacteremia
- Stool studies this far negative  - c diff indeterminate  - CMV PCR pending   - MMF on hold in setting of bacteremia
HepC DDRT on 7/17/18. Donor was 39yoM. CIT of 23 hours. Simulect induction. Had DGF. Last HD around 12/2018. Had ATN, borderline rejection, oxalate crystals on renal biopsy, treated w/ steroids. Had KEREN requiring stenting.   - Now with CHELA.
HepC DDRT on 7/17/18. Donor was 39yoM. CIT of 23 hours. Simulect induction. Had DGF. Last HD around 12/2018. Had ATN, borderline rejection, oxalate crystals on renal biopsy, treated w/ steroids. Had KEREN requiring stenting. Now with CHELA.
HepC DDRT on 7/17/18. Donor was 39yoM. CIT of 23 hours. Simulect induction. Had DGF. Last HD around 12/2018. Had ATN, borderline rejection, oxalate crystals on renal biopsy, treated w/ steroids. Had KEREN requiring stenting. Now with CHELA.
On tac 4.5/4.5, MMF 500mg BID, prednisone, atovaquone.   - Hold MMF in setting of infection/diarrhea.  - Holding tac.  - C/w prednisone 5mg/daily and atovaquone for ppx.
-Hold tacrolimus and MMF in the setting of bactermia  -Continue Prednisone 5 mg daily. Atovaquone for prophylaxis.   - Obtain Tacrolimus level.   -Continue with mepron.

## 2019-03-05 NOTE — PROGRESS NOTE ADULT - PROBLEM SELECTOR PLAN 4
On Glimepiride and Januvia outpatient.   - Diabetic diet.   - BGMs AC/HS.   - SSI while inpatient.
- On Glimepiride and Januvia outpatient.   - Diabetic diet.   - BGMs AC/HS.   - continue ISS
- On Glimepiride and Januvia outpatient.   - Diabetic diet.   - Humalog SS AC/HS.   - continue ISS
- On Glimepiride and Januvia outpatient.   - Diabetic diet.   BG remains elevated with sliding scale coverage over 16 u x24 hours  Start lantus 6units and Humalog premeals 3units and monitor closely  - Humalog SS AC/HS.
- Resume Glimepiride on discharge
On glimipiride and januvia as outpt. FS well controlled.  - Continue to hold oral hypoglycemics.  - Monitor blood glucose  - ICS.
On tac 4.5/4.5, MMF 500mg BID, prednisone, atovaquone.   - Hold MMF in setting of infection/diarrhea.  - C/w tacrolimus 4.5/4/5, prednisone 5mg/daily and atovaquone for ppx.
On tac 4.5/4.5, MMF 500mg BID, prednisone, atovaquone.   - Hold MMF in setting of infection/diarrhea.  - C/w tacrolimus 4.5/4/5, prednisone 5mg/daily and atovaquone for ppx.
On tac 4.5/4.5, MMF 500mg BID, prednisone, atovaquone.   - Hold MMF in setting of infection/diarrhea.  - Holding tac.  - C/w prednisone 5mg/daily and atovaquone for ppx.

## 2019-03-05 NOTE — DISCHARGE NOTE PROVIDER - NSDCCPCAREPLAN_GEN_ALL_CORE_FT
PRINCIPAL DISCHARGE DIAGNOSIS  Problem: Bacteremia due to Escherichia coli  Assessment and Plan of Treatment: Call transplant clinic If you developed any of the following, fever, pain, redness, swelling, cough, nausea, vomiting, painful urination, diarrhea difficulty urination, or not making any urine.    -You had a bacteria (Ecoli) growing both in your blood and urine and you were treated with antibiotics in the hospital. You are being discharged on an antibiotic called Ciprofloxacin 500. You need to take this medication once a day (with food) for 7 days.  -Please make sure you complete the entire course of the antibiotic. Do not stop taking the antibiotic even though you may no longer feel sick. PRINCIPAL DISCHARGE DIAGNOSIS  Problem: Bacteremia due to Escherichia coli  Assessment and Plan of Treatment: Call transplant clinic If you developed any of the following, fever, pain, redness, swelling, cough, nausea, vomiting, painful urination, diarrhea difficulty urination, or not making any urine.    -You had a bacteria (Ecoli) growing both in your blood and urine and you were treated with antibiotics in the hospital. You are being discharged on an antibiotic called Ciprofloxacin 500. You need to take this medication once a day (with food) for 7 days.  -Please make sure you complete the entire course of the antibiotic. Do not stop taking the antibiotic even though you may no longer feel sick.         SECONDARY DISCHARGE DIAGNOSES  Problem: HTN (hypertension)  Assessment and Plan of Treatment: Continue Nifedipine 30 mg daily.  Be sure to follow a low salt diet. If you have been prescribed antihypertensive medications to control your blood pressure, be sure to take them every day as prescribed and do not miss any doses, the medications do not work if they are not taken consistently. Follow up with your Primary Care Doctor and have your Blood Pressure checked regularly.       Problem: Diarrhea  Assessment and Plan of Treatment: If you have mild diarrhea, you can take over the counter immodium as needed. If you diarrhea worsens, please call the outpatient office. Drink plenty of water to stay hydrated.    Problem: Diabetes mellitus  Assessment and Plan of Treatment: Please take the diabetes medications only as prescribed.   Follow a low carb low sugar diet. Continue to take all antidiabetic medications/insulin as prescribed. Follow up with your Primary Care Doctor regularly for blood sugar/A1c checks. Be sure to see an eye doctor and foot doctor on an annual basis.    Problem: Kidney transplant recipient  Assessment and Plan of Treatment: Take tacrolimus 4.5 mg two times a day (once in the morning and once in the evening). Do not take cellcept until you are instructed to do so by the outpatient team. Continue prednisone tablet 5mg daily.   Keep away from people who have cough, cold, and symptom of flu.  Only take medications that are on your discharge list  If you missed your medications call the transplant office, do not double up medication because you missed a dose.  If you have any question regarding your medication please call transplant office    Problem: CHELA (acute kidney injury)  Assessment and Plan of Treatment: -Drink enough water to prevent dehydration. If you notice increased swelling in legs/feet/arms/legs, please call outpatient office.    Problem: Benign prostatic hypertrophy  Assessment and Plan of Treatment: -Continue tamsulosin 0.4 mg at bedtime. If you experience difficultly in urination, please call outpatient office immediatley.

## 2019-03-05 NOTE — PROGRESS NOTE ADULT - ASSESSMENT
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  s/p ureteral stent removal on 2/19/19. Admitted with bacteremia, was started on meropenem, will be transitioned to cipro today prior to discharge.            Problem/Plan - 1:  ·  Problem: Bacteremia due to Escherichia coli.  Plan: - E Coli bacteremia and UTI  - Bld cx from 3/2 NGTD  - Last dose of meropenem today and will transition to Cipro 500mg daily x 1 week  - DC home today, f/u in clinic on Monday      Problem/Plan - 2:  ·  Problem: Kidney transplant recipient.  Plan: - good graft function  - Immuno: FK by level, cont to hold MMF (will be restarted as outpt), Pred 5mg daily  -Continue sodium bicarbonate     Problem/Plan - 3:  ·  Problem: HTN (hypertension).  Plan: - Resume Nifedipine Xl 30mg daily.      Problem/Plan - 4:  ·  Problem: Diabetes mellitus.  Plan: - Resume Glimepiride on discharge.

## 2019-03-05 NOTE — DISCHARGE NOTE PROVIDER - NSDCFUADDINST_GEN_ALL_CORE_FT
FOLLOW-UP:  1. Please call to make a follow-up appointment with Dr. Castaneda and Dr. Bhakta within the next week.   2. Please follow up with your primary care physician in one week regarding your hospitalization.    No heavy lifting anything more than 10-15lbs or straining. Otherwise, you may return to your usual level of physical activity. If you are taking narcotic pain medication (such as Percocet), do NOT drive a car, operate machinery or make important decisions.  Call transplant clinic If you developed any of the following, fever, pain, redness, swelling at incision site, cough, nausea, vomiting, painful urination, difficulty urination, or not making any urine.

## 2019-03-05 NOTE — PROGRESS NOTE ADULT - PROBLEM SELECTOR PLAN 1
Patient with CHELA, likely hemodynamically mediated in setting of hypotension and sepsis from UTI. Scr. was 5.8 on admission(2/27/19), peaked at 6.28 on 2/28/19, and has improved to 2.73 today. Patient is non oliguric. Continue with antibiotics for UTI. Monitor Scr, I/O and electrolytes. Avoid NSAIDs, RCAs, and other nephrotoxins.

## 2019-03-05 NOTE — DISCHARGE NOTE PROVIDER - HOSPITAL COURSE
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  s/p ureteral stent removal on 2/19/19. Patient presented on 2/27 w/ hypotension, weakness, cough, diarrhea and CHELA; admission blood and urine cultures were positive for E. coli. CT chest and renal US were negative; CT abd/pelvis was + for cystitis. All other work-up including stool studies and RVP were  negative. MMF was held in the setting of bacteremia;  was initially given vanc X1, cefepime and zosyn, later started on meropenem, will be discharged today on 1 week of cipro 500mg daily. Cr downtrended w/ IVF, diarrhea improved and patient now having more formed BMs. Patient w/ normal vitals, normal WBC, Cr improving, repeat  blood cx from 3/2 w/ no growth, on tac 4.5 BID and pred 5. Will continue to hold MMF on discharge and plan to restart as outpatient. He is medically stable for discharge w/ outpatient follow up. 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  s/p ureteral stent removal on 2/19/19. Patient presented on 2/27 w/ hypotension, weakness, cough, diarrhea and CHELA; admission blood and urine cultures were positive for E. coli. CT chest and renal US were negative; CT abd/pelvis was + for cystitis. All other work-up including stool studies and RVP were  negative. MMF was held in the setting of bacteremia;  was initially given vanc X1, cefepime and zosyn, later started on meropenem, will be discharged today on 1 week of cipro 500mg daily. Cr downtrended w/ IVF, diarrhea improved and patient now having more formed BMs. Patient w/ normal vitals, normal WBC, Cr improving, repeat  blood cx from 3/2 w/ no growth, on tac 4.5 BID and pred 5. Will continue to hold MMF on discharge and plan to restart as outpatient. He is medically stable for discharge w/ outpatient follow up.

## 2019-03-06 LAB
E BIENEUSI DNA SPEC QL NAA+PROBE: SIGNIFICANT CHANGE UP
ENCEPHALITOZOON SPECIES: SIGNIFICANT CHANGE UP
SPECIMEN SOURCE: SIGNIFICANT CHANGE UP

## 2019-03-07 ENCOUNTER — LABORATORY RESULT (OUTPATIENT)
Age: 69
End: 2019-03-07

## 2019-03-07 ENCOUNTER — APPOINTMENT (OUTPATIENT)
Dept: NEPHROLOGY | Facility: CLINIC | Age: 69
End: 2019-03-07
Payer: MEDICARE

## 2019-03-07 VITALS
HEART RATE: 94 BPM | RESPIRATION RATE: 19 BRPM | OXYGEN SATURATION: 96 % | HEIGHT: 71 IN | BODY MASS INDEX: 32.62 KG/M2 | WEIGHT: 233 LBS | TEMPERATURE: 98.2 F | SYSTOLIC BLOOD PRESSURE: 164 MMHG | DIASTOLIC BLOOD PRESSURE: 83 MMHG

## 2019-03-07 LAB
CULTURE RESULTS: SIGNIFICANT CHANGE UP
SPECIMEN SOURCE: SIGNIFICANT CHANGE UP

## 2019-03-07 PROCEDURE — 99214 OFFICE O/P EST MOD 30 MIN: CPT

## 2019-03-08 LAB
ALBUMIN SERPL ELPH-MCNC: 3.6 G/DL
ALP BLD-CCNC: 114 U/L
ALT SERPL-CCNC: 35 U/L
ANION GAP SERPL CALC-SCNC: 11 MMOL/L
APPEARANCE: CLEAR
AST SERPL-CCNC: 26 U/L
BACTERIA: ABNORMAL
BASOPHILS # BLD AUTO: 0.06 K/UL
BASOPHILS NFR BLD AUTO: 0.9 %
BILIRUB SERPL-MCNC: 0.4 MG/DL
BILIRUBIN URINE: NEGATIVE
BKV DNA SPEC QL NAA+PROBE: NOT DETECTED COPIES/ML
BLOOD URINE: ABNORMAL
BUN SERPL-MCNC: 29 MG/DL
CALCIUM SERPL-MCNC: 9.4 MG/DL
CHLORIDE SERPL-SCNC: 111 MMOL/L
CMV DNA SPEC QL NAA+PROBE: NOT DETECTED
CMVPCR LOG: NOT DETECTED LOGIU/ML
CO2 SERPL-SCNC: 22 MMOL/L
COLOR: YELLOW
CREAT SERPL-MCNC: 2.51 MG/DL
CREAT SPEC-SCNC: 145 MG/DL
CREAT/PROT UR: 0.3 RATIO
EOSINOPHIL # BLD AUTO: 0 K/UL
EOSINOPHIL NFR BLD AUTO: 0 %
GLUCOSE QUALITATIVE U: NEGATIVE
GLUCOSE SERPL-MCNC: 213 MG/DL
HCT VFR BLD CALC: 31.2 %
HGB BLD-MCNC: 9.2 G/DL
HYALINE CASTS: 0 /LPF
KETONES URINE: NEGATIVE
LDH SERPL-CCNC: 212 U/L
LEUKOCYTE ESTERASE URINE: NEGATIVE
LYMPHOCYTES # BLD AUTO: 0.6 K/UL
LYMPHOCYTES NFR BLD AUTO: 8.7 %
MAGNESIUM SERPL-MCNC: 1.7 MG/DL
MAN DIFF?: NORMAL
MCHC RBC-ENTMCNC: 29.5 GM/DL
MCHC RBC-ENTMCNC: 30.1 PG
MCV RBC AUTO: 102 FL
MICROSCOPIC-UA: NORMAL
MONOCYTES # BLD AUTO: 0.48 K/UL
MONOCYTES NFR BLD AUTO: 6.9 %
NEUTROPHILS # BLD AUTO: 5.77 K/UL
NEUTROPHILS NFR BLD AUTO: 83.5 %
NITRITE URINE: NEGATIVE
PH URINE: 6
PHOSPHATE SERPL-MCNC: 3 MG/DL
PLATELET # BLD AUTO: 277 K/UL
POTASSIUM SERPL-SCNC: 4.8 MMOL/L
PROT SERPL-MCNC: 6.5 G/DL
PROT UR-MCNC: 47 MG/DL
PROTEIN URINE: ABNORMAL
RBC # BLD: 3.06 M/UL
RBC # FLD: 14.8 %
RED BLOOD CELLS URINE: 28 /HPF
SODIUM SERPL-SCNC: 144 MMOL/L
SPECIFIC GRAVITY URINE: 1.03
SQUAMOUS EPITHELIAL CELLS: 10 /HPF
TACROLIMUS SERPL-MCNC: 5.5 NG/ML
URATE SERPL-MCNC: 8.5 MG/DL
UROBILINOGEN URINE: NORMAL
WBC # FLD AUTO: 6.91 K/UL
WHITE BLOOD CELLS URINE: 25 /HPF

## 2019-03-08 NOTE — ASSESSMENT
[FreeTextEntry1] : 1.  Kidney transplantation - Pts aaron creatinine 1.9 but had CHELA with pyelonpheritis.  Repeat renal panel today.  Biopsy last revealed borderline rejection and oxalate crystals which was treated with steroids.  Nephrostomy and JJ stent have been removed. \par 2.  Immunosuppression - simulect induction, tacrolimus target 7-9, MMF 500mgs BID (due to leukopenia and recent infection) and pred 5. \par 3.  DM2 - on januvia and glimepiride.  BG controlled. \par 4.  Hypertension - controlled at home. \par 5.  HCV - 2 million copies, genotype 3a.  completed Epclusa.  Last vl note detected.  \par 6.  Anemia - had been receiving aranesp from his hematologist but no longer\par 7.  Chronic diarrhea - on and off on lomotil prn.  Has rectal fissure.  Has seen GI and had a colonoscopy.   Has history of small bowel resection which may be the cause of diarrhea.  \par 8.  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  \par \par Pt will return for f/u in 2 weeks \par He will also see Dr. Bonilla.  \par

## 2019-03-08 NOTE — CONSULT LETTER
[Dear  ___] : Dear  [unfilled], [Courtesy Letter:] : I had the pleasure of seeing your patient, [unfilled], in my office today. [Please see my note below.] : Please see my note below. [Sincerely,] : Sincerely, [FreeTextEntry3] : Arian Bhakta, DO

## 2019-03-08 NOTE — PHYSICAL EXAM
[General Appearance - Alert] : alert [General Appearance - In No Acute Distress] : in no acute distress [General Appearance - Well Nourished] : well nourished [General Appearance - Well Developed] : well developed [General Appearance - Well-Appearing] : healthy appearing [Sclera] : the sclera and conjunctiva were normal [Extraocular Movements] : extraocular movements were intact [Outer Ear] : the ears and nose were normal in appearance [Neck Appearance] : the appearance of the neck was normal [Neck Cervical Mass (___cm)] : no neck mass was observed [Jugular Venous Distention Increased] : there was no jugular-venous distention [Respiration, Rhythm And Depth] : normal respiratory rhythm and effort [Exaggerated Use Of Accessory Muscles For Inspiration] : no accessory muscle use [Auscultation Breath Sounds / Voice Sounds] : lungs were clear to auscultation bilaterally [Heart Rate And Rhythm] : heart rate was normal and rhythm regular [Heart Sounds] : normal S1 and S2 [Heart Sounds Gallop] : no gallops [Bowel Sounds] : normal bowel sounds [Abdomen Soft] : soft [Abdomen Tenderness] : non-tender [] : no hepato-splenomegaly [Cervical Lymph Nodes Enlarged Posterior Bilaterally] : posterior cervical [Cervical Lymph Nodes Enlarged Anterior Bilaterally] : anterior cervical [Supraclavicular Lymph Nodes Enlarged Bilaterally] : supraclavicular [No CVA Tenderness] : no ~M costovertebral angle tenderness [Abnormal Walk] : normal gait [Nail Clubbing] : no clubbing  or cyanosis of the fingernails [Musculoskeletal - Swelling] : no joint swelling seen [___ (cm) Fistula] : [unfilled] (cm) fistula [Skin Color & Pigmentation] : normal skin color and pigmentation [Skin Turgor] : normal skin turgor [Cranial Nerves] : cranial nerves 2-12 were intact [Sensation] : the sensory exam was normal to light touch and pinprick [Oriented To Time, Place, And Person] : oriented to person, place, and time [FreeTextEntry1] : + 1 b/l LE edema.

## 2019-03-08 NOTE — HISTORY OF PRESENT ILLNESS
[FreeTextEntry1] : Mr. Medrano is a 67 yrs old male ESRD secondary to DM2 (HD since 2015, followed by Dr. Bonilla) s/p  donor kidney transplant on 18.\par HCV donor. 40 yrs old male. cold ischemia time 23 hrs. delayed graft function. \par \par PT was readmitted for anemia and hematoma.  Required 2 kidney biopsies which revealed few oxalate crystals and borderline rejection for which he was treated with solumedrol 375 and prednisone taper.  Had dialysis after discharge but creatinine has started improving.  Last dialysis about 1 week ago.  Wound also has been having some drainage. \par \par Interval events:\par Pt admitted to hospital after stent removal for CHELA/ pyelonephritis.  No hydro on sonogram.  Treated with meropenem and now on PO cipro.  Feels much better.  Minimal diarrhea

## 2019-03-11 ENCOUNTER — APPOINTMENT (OUTPATIENT)
Dept: TRANSPLANT | Facility: CLINIC | Age: 69
End: 2019-03-11
Payer: MEDICARE

## 2019-03-11 ENCOUNTER — LABORATORY RESULT (OUTPATIENT)
Age: 69
End: 2019-03-11

## 2019-03-11 VITALS
SYSTOLIC BLOOD PRESSURE: 111 MMHG | BODY MASS INDEX: 33.04 KG/M2 | WEIGHT: 236 LBS | OXYGEN SATURATION: 94 % | TEMPERATURE: 99.2 F | HEART RATE: 116 BPM | DIASTOLIC BLOOD PRESSURE: 65 MMHG | RESPIRATION RATE: 19 BRPM | HEIGHT: 71 IN

## 2019-03-11 PROCEDURE — 99214 OFFICE O/P EST MOD 30 MIN: CPT

## 2019-03-11 NOTE — PHYSICAL EXAM
[General Appearance - Alert] : alert [General Appearance - In No Acute Distress] : in no acute distress [General Appearance - Well-Appearing] : healthy appearing [] : no respiratory distress [Auscultation Breath Sounds / Voice Sounds] : lungs were clear to auscultation bilaterally [Heart Sounds] : normal S1 and S2 [Arterial Pulses Femoral] : femoral pulses were normal without bruits [Full Pulse] : the pedal pulses are present [Edema] : there was no peripheral edema [Abdomen Soft] : soft [Abdomen Tenderness] : non-tender [Abdomen Hernia] : no hernia was discovered [Clean] : clean [Dry] : dry [Healing Well] : healing well [Bleeding] : no active bleeding [Foul Odor] : no foul smell [Purulent Drainage] : no purulent drainage [Serosanguinous Drainage] : no serosanguinous drainage [Erythema] : not erythematous [Warm] : not warm [Tender] : not tender [No Edema] : no edema [Oriented To Time, Place, And Person] : oriented to person, place, and time [FreeTextEntry1] : s/p DDRT in 2018, recovering from recent bacteremia\par complete course of cipro\par immunosuppression- Cont prograf 4.5mg bid; prednisone 5mg daily; cellcept 500mg bid\par f/u with Dr Bhakta in few weeks

## 2019-03-11 NOTE — REASON FOR VISIT
[de-identified] : 7/7/18 [de-identified] : 68M s/p DDRT 7/2018 with HCV donor, post-op DGF\par Treated for mild rejection in the past.\par \par Pt admitted to hospital last week for e.coli bacteremia, treated with meropenem and now on PO cipro. Feels much better. Denies any abdominal pain, fevers, chills, nausea vomiting or diarrhea.\par \par He is taking Prograf 4.5mg bid.

## 2019-03-12 LAB
ALBUMIN SERPL ELPH-MCNC: 3.7 G/DL
ALP BLD-CCNC: 108 U/L
ALT SERPL-CCNC: 29 U/L
ANION GAP SERPL CALC-SCNC: 11 MMOL/L
APPEARANCE: CLEAR
AST SERPL-CCNC: 17 U/L
BASOPHILS # BLD AUTO: 0.02 K/UL
BASOPHILS NFR BLD AUTO: 0.2 %
BILIRUB SERPL-MCNC: 0.4 MG/DL
BILIRUBIN URINE: NEGATIVE
BLOOD URINE: NEGATIVE
BUN SERPL-MCNC: 18 MG/DL
CALCIUM SERPL-MCNC: 9.5 MG/DL
CHLORIDE SERPL-SCNC: 107 MMOL/L
CO2 SERPL-SCNC: 24 MMOL/L
COLOR: NORMAL
CREAT SERPL-MCNC: 2.33 MG/DL
CREAT SPEC-SCNC: 101 MG/DL
CREAT/PROT UR: 0.2 RATIO
EOSINOPHIL # BLD AUTO: 0.03 K/UL
EOSINOPHIL NFR BLD AUTO: 0.3 %
GLUCOSE QUALITATIVE U: NORMAL
GLUCOSE SERPL-MCNC: 165 MG/DL
HCT VFR BLD CALC: 31.3 %
HGB BLD-MCNC: 9.2 G/DL
IMM GRANULOCYTES NFR BLD AUTO: 0.7 %
KETONES URINE: NEGATIVE
LDH SERPL-CCNC: 213 U/L
LEUKOCYTE ESTERASE URINE: ABNORMAL
LYMPHOCYTES # BLD AUTO: 0.54 K/UL
LYMPHOCYTES NFR BLD AUTO: 5.4 %
MAGNESIUM SERPL-MCNC: 1.5 MG/DL
MAN DIFF?: NORMAL
MCHC RBC-ENTMCNC: 29.4 GM/DL
MCHC RBC-ENTMCNC: 30.7 PG
MCV RBC AUTO: 104.3 FL
MONOCYTES # BLD AUTO: 0.57 K/UL
MONOCYTES NFR BLD AUTO: 5.7 %
NEUTROPHILS # BLD AUTO: 8.83 K/UL
NEUTROPHILS NFR BLD AUTO: 87.7 %
NITRITE URINE: NEGATIVE
PH URINE: 6
PHOSPHATE SERPL-MCNC: 2.6 MG/DL
PLATELET # BLD AUTO: 225 K/UL
POTASSIUM SERPL-SCNC: 5.1 MMOL/L
PROT SERPL-MCNC: 6.4 G/DL
PROT UR-MCNC: 22 MG/DL
PROTEIN URINE: NORMAL
RBC # BLD: 3 M/UL
RBC # FLD: 14.5 %
SODIUM SERPL-SCNC: 142 MMOL/L
SPECIFIC GRAVITY URINE: 1.01
TACROLIMUS SERPL-MCNC: 5.3 NG/ML
URATE SERPL-MCNC: 6 MG/DL
UROBILINOGEN URINE: NORMAL
WBC # FLD AUTO: 10.06 K/UL

## 2019-03-12 RX ORDER — TACROLIMUS 0.5 MG/1
0.5 CAPSULE ORAL
Qty: 180 | Refills: 3 | Status: DISCONTINUED | COMMUNITY
Start: 2019-01-30 | End: 2019-03-12

## 2019-03-13 LAB
BKV DNA SPEC QL NAA+PROBE: NOT DETECTED COPIES/ML
CMV DNA SPEC QL NAA+PROBE: NOT DETECTED
CMVPCR LOG: NOT DETECTED LOGIU/ML

## 2019-03-22 LAB
ALBUMIN SERPL ELPH-MCNC: 3.6 G/DL
ALP BLD-CCNC: 73 U/L
ALT SERPL-CCNC: 31 U/L
ANION GAP SERPL CALC-SCNC: 15 MMOL/L
AST SERPL-CCNC: 38 U/L
BASOPHILS # BLD AUTO: 0.02 K/UL
BASOPHILS NFR BLD AUTO: 0.3 %
BILIRUB SERPL-MCNC: 0.6 MG/DL
BKV DNA SPEC QL NAA+PROBE: NOT DETECTED COPIES/ML
BUN SERPL-MCNC: 46 MG/DL
CALCIUM SERPL-MCNC: 8.8 MG/DL
CHLORIDE SERPL-SCNC: 105 MMOL/L
CO2 SERPL-SCNC: 17 MMOL/L
CREAT SERPL-MCNC: 3.64 MG/DL
EOSINOPHIL # BLD AUTO: 0.01 K/UL
EOSINOPHIL NFR BLD AUTO: 0.1 %
GLUCOSE SERPL-MCNC: 83 MG/DL
HCT VFR BLD CALC: 30.7 %
HGB BLD-MCNC: 9.8 G/DL
IMM GRANULOCYTES NFR BLD AUTO: 0.8 %
LYMPHOCYTES # BLD AUTO: 0.33 K/UL
LYMPHOCYTES NFR BLD AUTO: 4.3 %
MAN DIFF?: NORMAL
MCHC RBC-ENTMCNC: 31.4 PG
MCHC RBC-ENTMCNC: 31.9 GM/DL
MCV RBC AUTO: 98.4 FL
MONOCYTES # BLD AUTO: 0.78 K/UL
MONOCYTES NFR BLD AUTO: 10.2 %
NEUTROPHILS # BLD AUTO: 6.47 K/UL
NEUTROPHILS NFR BLD AUTO: 84.3 %
PLATELET # BLD AUTO: 113 K/UL
POTASSIUM SERPL-SCNC: 3.8 MMOL/L
PROT SERPL-MCNC: 6.2 G/DL
RBC # BLD: 3.12 M/UL
RBC # FLD: 14 %
SODIUM SERPL-SCNC: 137 MMOL/L
WBC # FLD AUTO: 7.67 K/UL

## 2019-03-25 ENCOUNTER — OTHER (OUTPATIENT)
Age: 69
End: 2019-03-25

## 2019-03-26 ENCOUNTER — APPOINTMENT (OUTPATIENT)
Dept: TRANSPLANT | Facility: CLINIC | Age: 69
End: 2019-03-26

## 2019-04-09 ENCOUNTER — APPOINTMENT (OUTPATIENT)
Dept: TRANSPLANT | Facility: CLINIC | Age: 69
End: 2019-04-09

## 2019-04-10 LAB
ALBUMIN SERPL ELPH-MCNC: 4.1 G/DL
ALP BLD-CCNC: 88 U/L
ALT SERPL-CCNC: 11 U/L
ANION GAP SERPL CALC-SCNC: 13 MMOL/L
APPEARANCE: CLEAR
AST SERPL-CCNC: 15 U/L
BACTERIA: NEGATIVE
BASOPHILS # BLD AUTO: 0.05 K/UL
BASOPHILS NFR BLD AUTO: 0.9 %
BILIRUB SERPL-MCNC: 0.2 MG/DL
BILIRUBIN URINE: NEGATIVE
BKV DNA SPEC QL NAA+PROBE: NOT DETECTED COPIES/ML
BLOOD URINE: NEGATIVE
BUN SERPL-MCNC: 24 MG/DL
CALCIUM SERPL-MCNC: 9.7 MG/DL
CHLORIDE SERPL-SCNC: 107 MMOL/L
CO2 SERPL-SCNC: 20 MMOL/L
COLOR: NORMAL
CREAT SERPL-MCNC: 3.46 MG/DL
CREAT SPEC-SCNC: 162 MG/DL
CREAT/PROT UR: 0.1 RATIO
EOSINOPHIL # BLD AUTO: 0.13 K/UL
EOSINOPHIL NFR BLD AUTO: 2.3 %
GLUCOSE QUALITATIVE U: NEGATIVE
GLUCOSE SERPL-MCNC: 178 MG/DL
HCT VFR BLD CALC: 32.6 %
HGB BLD-MCNC: 10.1 G/DL
HYALINE CASTS: 0 /LPF
IMM GRANULOCYTES NFR BLD AUTO: 2.4 %
KETONES URINE: NEGATIVE
LDH SERPL-CCNC: 156 U/L
LEUKOCYTE ESTERASE URINE: NEGATIVE
LYMPHOCYTES # BLD AUTO: 0.76 K/UL
LYMPHOCYTES NFR BLD AUTO: 13.3 %
MAGNESIUM SERPL-MCNC: 1.4 MG/DL
MAN DIFF?: NORMAL
MCHC RBC-ENTMCNC: 30.8 PG
MCHC RBC-ENTMCNC: 31 GM/DL
MCV RBC AUTO: 99.4 FL
MICROSCOPIC-UA: NORMAL
MONOCYTES # BLD AUTO: 0.53 K/UL
MONOCYTES NFR BLD AUTO: 9.2 %
NEUTROPHILS # BLD AUTO: 4.12 K/UL
NEUTROPHILS NFR BLD AUTO: 71.9 %
NITRITE URINE: NEGATIVE
PH URINE: 6
PHOSPHATE SERPL-MCNC: 3.4 MG/DL
PLATELET # BLD AUTO: 206 K/UL
POTASSIUM SERPL-SCNC: 5.2 MMOL/L
PROT SERPL-MCNC: 6.7 G/DL
PROT UR-MCNC: 14 MG/DL
PROTEIN URINE: NORMAL
RBC # BLD: 3.28 M/UL
RBC # FLD: 14.1 %
RED BLOOD CELLS URINE: 3 /HPF
SODIUM SERPL-SCNC: 140 MMOL/L
SPECIFIC GRAVITY URINE: 1.02
SQUAMOUS EPITHELIAL CELLS: 2 /HPF
TACROLIMUS SERPL-MCNC: 11 NG/ML
URATE SERPL-MCNC: 4.2 MG/DL
UROBILINOGEN URINE: NORMAL
WBC # FLD AUTO: 5.73 K/UL
WHITE BLOOD CELLS URINE: 11 /HPF

## 2019-04-11 ENCOUNTER — APPOINTMENT (OUTPATIENT)
Dept: NEPHROLOGY | Facility: CLINIC | Age: 69
End: 2019-04-11
Payer: MEDICARE

## 2019-04-11 VITALS
WEIGHT: 222 LBS | BODY MASS INDEX: 31.08 KG/M2 | DIASTOLIC BLOOD PRESSURE: 71 MMHG | TEMPERATURE: 98.7 F | OXYGEN SATURATION: 97 % | HEART RATE: 120 BPM | SYSTOLIC BLOOD PRESSURE: 147 MMHG | HEIGHT: 71 IN | RESPIRATION RATE: 16 BRPM

## 2019-04-11 PROCEDURE — 99214 OFFICE O/P EST MOD 30 MIN: CPT

## 2019-04-11 RX ORDER — DIPHENOXYLATE HYDROCHLORIDE AND ATROPINE SULFATE 2.5; .025 MG/1; MG/1
2.5-0.025 TABLET ORAL 3 TIMES DAILY
Qty: 180 | Refills: 0 | Status: DISCONTINUED | COMMUNITY
Start: 2018-08-17 | End: 2019-04-11

## 2019-04-11 RX ORDER — NIFEDIPINE 60 MG/1
60 TABLET, FILM COATED, EXTENDED RELEASE ORAL
Qty: 30 | Refills: 0 | Status: DISCONTINUED | COMMUNITY
Start: 2019-03-07 | End: 2019-04-11

## 2019-04-11 RX ORDER — CIPROFLOXACIN HYDROCHLORIDE 500 MG/1
500 TABLET, FILM COATED ORAL
Qty: 21 | Refills: 0 | Status: DISCONTINUED | COMMUNITY
Start: 2019-03-07 | End: 2019-04-11

## 2019-04-11 NOTE — ASSESSMENT
[FreeTextEntry1] : 1.  Kidney transplantation - Pts aaron creatinine 1.9 but had CHELA with pyelonpheritis.  Came down to 2.2 then CHELA again likely due to bactrim.  Stopped bactrim and will check urine culture today.  DId not really have symptoms of UTI.\par Has had last biopsy which revealed borderline rejection and oxalate crystals which was treated with steroids.  Nephrostomy and JJ stent have been removed. \par 2.  Immunosuppression - simulect induction, tacrolimus target 7-9, MMF 500mgs BID (due to leukopenia and recent infection) and pred 5. \par 3.  DM2 - on januvia and glimepiride.  BG controlled. \par 4.  Hypertension/ tachycardia - EKG revealed NSR.  Asked pt to reduce coffee intake.  Will also dec nifedipine to 30mgs and add metoprolol ER 50mgs daily.   \par 5.  HCV - genotype 3a.  completed Epclusa.  Last vl note detected.  \par 6.  Anemia - had been receiving aranesp from his hematologist but no longer\par 7.  Chronic diarrhea - on and off on lomotil prn.  Has rectal fissure.  Has seen GI and had a colonoscopy.   Has history of small bowel resection which may be the cause of diarrhea.  \par 8.  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  \par \par Pt will return for labs next week when off of bactrim.  \par He will also see Dr. Bonilla.  \par

## 2019-04-11 NOTE — PHYSICAL EXAM
[General Appearance - Alert] : alert [General Appearance - In No Acute Distress] : in no acute distress [General Appearance - Well Nourished] : well nourished [General Appearance - Well Developed] : well developed [General Appearance - Well-Appearing] : healthy appearing [Sclera] : the sclera and conjunctiva were normal [Extraocular Movements] : extraocular movements were intact [Outer Ear] : the ears and nose were normal in appearance [Neck Appearance] : the appearance of the neck was normal [Neck Cervical Mass (___cm)] : no neck mass was observed [Jugular Venous Distention Increased] : there was no jugular-venous distention [Auscultation Breath Sounds / Voice Sounds] : lungs were clear to auscultation bilaterally [Exaggerated Use Of Accessory Muscles For Inspiration] : no accessory muscle use [Respiration, Rhythm And Depth] : normal respiratory rhythm and effort [Heart Rate And Rhythm] : heart rate was normal and rhythm regular [Heart Sounds] : normal S1 and S2 [Heart Sounds Gallop] : no gallops [Bowel Sounds] : normal bowel sounds [Abdomen Soft] : soft [Abdomen Tenderness] : non-tender [] : no hepato-splenomegaly [Cervical Lymph Nodes Enlarged Posterior Bilaterally] : posterior cervical [Cervical Lymph Nodes Enlarged Anterior Bilaterally] : anterior cervical [Supraclavicular Lymph Nodes Enlarged Bilaterally] : supraclavicular [No CVA Tenderness] : no ~M costovertebral angle tenderness [Abnormal Walk] : normal gait [Nail Clubbing] : no clubbing  or cyanosis of the fingernails [Musculoskeletal - Swelling] : no joint swelling seen [___ (cm) Fistula] : [unfilled] (cm) fistula [Skin Turgor] : normal skin turgor [Skin Color & Pigmentation] : normal skin color and pigmentation [Cranial Nerves] : cranial nerves 2-12 were intact [Sensation] : the sensory exam was normal to light touch and pinprick [Oriented To Time, Place, And Person] : oriented to person, place, and time [FreeTextEntry1] : + 1 b/l LE edema.

## 2019-04-11 NOTE — HISTORY OF PRESENT ILLNESS
[FreeTextEntry1] : Mr. Medrano is a 67 yrs old male ESRD secondary to DM2 (HD since 2015, followed by Dr. Bonilla) s/p  donor kidney transplant on 18.\par HCV donor. 40 yrs old male. cold ischemia time 23 hrs. delayed graft function. \par \par PT was readmitted for anemia and hematoma.  Required 2 kidney biopsies which revealed few oxalate crystals and borderline rejection for which he was treated with solumedrol 375 and prednisone taper.  \par \par Interval events:\par Pt recently had labs where creatinine increased to 3.  Pt developed constipation and some pelvic discomfort and was placed on bactrim DS by urgent care.  No urine culture sent but CXR normal.  Had stool in colon.  Currently feels much better, had coffee and liquid stool this morning.  No palpitations.  Last dose of bactrim last night.

## 2019-04-11 NOTE — CONSULT LETTER
[Dear  ___] : Dear  [unfilled], [Courtesy Letter:] : I had the pleasure of seeing your patient, [unfilled], in my office today. [Sincerely,] : Sincerely, [Please see my note below.] : Please see my note below. [FreeTextEntry3] : Arian Bhakta, DO

## 2019-04-12 LAB
ALBUMIN SERPL ELPH-MCNC: 4.4 G/DL
ALP BLD-CCNC: 96 U/L
ALT SERPL-CCNC: 13 U/L
ANION GAP SERPL CALC-SCNC: 11 MMOL/L
APPEARANCE: CLEAR
AST SERPL-CCNC: 19 U/L
BACTERIA UR CULT: NORMAL
BACTERIA: NEGATIVE
BASOPHILS # BLD AUTO: 0.04 K/UL
BASOPHILS NFR BLD AUTO: 0.6 %
BILIRUB SERPL-MCNC: 0.2 MG/DL
BILIRUBIN URINE: NEGATIVE
BKV DNA SPEC QL NAA+PROBE: NOT DETECTED COPIES/ML
BLOOD URINE: NEGATIVE
BUN SERPL-MCNC: 23 MG/DL
CALCIUM SERPL-MCNC: 10.4 MG/DL
CHLORIDE SERPL-SCNC: 106 MMOL/L
CMV DNA SPEC QL NAA+PROBE: 109 IU/ML
CMV DNA SPEC QL NAA+PROBE: 144 IU/ML
CMVPCR LOG: 2.04 LOGIU/ML
CMVPCR LOG: 2.16 LOGIU/ML
CO2 SERPL-SCNC: 23 MMOL/L
COLOR: NORMAL
CREAT SERPL-MCNC: 3.26 MG/DL
CREAT SPEC-SCNC: 125 MG/DL
CREAT/PROT UR: 0.1 RATIO
EOSINOPHIL # BLD AUTO: 0.15 K/UL
EOSINOPHIL NFR BLD AUTO: 2.3 %
GLUCOSE QUALITATIVE U: NEGATIVE
GLUCOSE SERPL-MCNC: 214 MG/DL
HCT VFR BLD CALC: 33.9 %
HGB BLD-MCNC: 10.3 G/DL
HYALINE CASTS: 0 /LPF
IMM GRANULOCYTES NFR BLD AUTO: 2 %
KETONES URINE: NEGATIVE
LDH SERPL-CCNC: 165 U/L
LEUKOCYTE ESTERASE URINE: NEGATIVE
LYMPHOCYTES # BLD AUTO: 0.78 K/UL
LYMPHOCYTES NFR BLD AUTO: 11.8 %
MAGNESIUM SERPL-MCNC: 1.4 MG/DL
MAN DIFF?: NORMAL
MCHC RBC-ENTMCNC: 30.3 PG
MCHC RBC-ENTMCNC: 30.4 GM/DL
MCV RBC AUTO: 99.7 FL
MICROSCOPIC-UA: NORMAL
MONOCYTES # BLD AUTO: 0.51 K/UL
MONOCYTES NFR BLD AUTO: 7.7 %
NEUTROPHILS # BLD AUTO: 5.02 K/UL
NEUTROPHILS NFR BLD AUTO: 75.6 %
NITRITE URINE: NEGATIVE
PH URINE: 6
PHOSPHATE SERPL-MCNC: 3.3 MG/DL
PLATELET # BLD AUTO: 208 K/UL
POTASSIUM SERPL-SCNC: 5.9 MMOL/L
PROT SERPL-MCNC: 6.8 G/DL
PROT UR-MCNC: 10 MG/DL
PROTEIN URINE: NORMAL
RBC # BLD: 3.4 M/UL
RBC # FLD: 14.6 %
RED BLOOD CELLS URINE: 1 /HPF
SODIUM SERPL-SCNC: 140 MMOL/L
SPECIFIC GRAVITY URINE: 1.02
SQUAMOUS EPITHELIAL CELLS: 1 /HPF
TACROLIMUS SERPL-MCNC: 8.5 NG/ML
URATE SERPL-MCNC: 3.4 MG/DL
UROBILINOGEN URINE: NORMAL
WBC # FLD AUTO: 6.63 K/UL
WHITE BLOOD CELLS URINE: 3 /HPF

## 2019-04-17 ENCOUNTER — APPOINTMENT (OUTPATIENT)
Dept: TRANSPLANT | Facility: CLINIC | Age: 69
End: 2019-04-17

## 2019-04-19 ENCOUNTER — APPOINTMENT (OUTPATIENT)
Dept: TRANSPLANT | Facility: CLINIC | Age: 69
End: 2019-04-19

## 2019-04-19 LAB
ALBUMIN SERPL ELPH-MCNC: 4.1 G/DL
ALP BLD-CCNC: 88 U/L
ALT SERPL-CCNC: 20 U/L
ANION GAP SERPL CALC-SCNC: 13 MMOL/L
APPEARANCE: CLEAR
AST SERPL-CCNC: 22 U/L
BACTERIA: NEGATIVE
BASOPHILS # BLD AUTO: 0.02 K/UL
BASOPHILS NFR BLD AUTO: 0.5 %
BILIRUB SERPL-MCNC: 0.3 MG/DL
BILIRUBIN URINE: NEGATIVE
BLOOD URINE: NEGATIVE
BUN SERPL-MCNC: 25 MG/DL
CALCIUM SERPL-MCNC: 9.5 MG/DL
CHLORIDE SERPL-SCNC: 108 MMOL/L
CO2 SERPL-SCNC: 20 MMOL/L
COLOR: NORMAL
CREAT SERPL-MCNC: 2.42 MG/DL
CREAT SPEC-SCNC: 191 MG/DL
CREAT/PROT UR: 0.2 RATIO
EOSINOPHIL # BLD AUTO: 0.08 K/UL
EOSINOPHIL NFR BLD AUTO: 2 %
GLUCOSE QUALITATIVE U: NEGATIVE
GLUCOSE SERPL-MCNC: 179 MG/DL
GRANULAR CASTS: 1 /LPF
HCT VFR BLD CALC: 32.2 %
HGB BLD-MCNC: 9.9 G/DL
HYALINE CASTS: 0 /LPF
IMM GRANULOCYTES NFR BLD AUTO: 0.5 %
KETONES URINE: NEGATIVE
LDH SERPL-CCNC: 155 U/L
LEUKOCYTE ESTERASE URINE: NEGATIVE
LYMPHOCYTES # BLD AUTO: 0.52 K/UL
LYMPHOCYTES NFR BLD AUTO: 13.2 %
MAGNESIUM SERPL-MCNC: 1.2 MG/DL
MAN DIFF?: NORMAL
MCHC RBC-ENTMCNC: 30.7 GM/DL
MCHC RBC-ENTMCNC: 30.8 PG
MCV RBC AUTO: 100.3 FL
MICROSCOPIC-UA: NORMAL
MONOCYTES # BLD AUTO: 0.52 K/UL
MONOCYTES NFR BLD AUTO: 13.2 %
NEUTROPHILS # BLD AUTO: 2.77 K/UL
NEUTROPHILS NFR BLD AUTO: 70.6 %
NITRITE URINE: NEGATIVE
PH URINE: 5.5
PHOSPHATE SERPL-MCNC: 3.2 MG/DL
PLATELET # BLD AUTO: 105 K/UL
POTASSIUM SERPL-SCNC: 4.9 MMOL/L
PROT SERPL-MCNC: 6.6 G/DL
PROT UR-MCNC: 35 MG/DL
PROTEIN URINE: NORMAL
RBC # BLD: 3.21 M/UL
RBC # FLD: 14.7 %
RED BLOOD CELLS URINE: 2 /HPF
SODIUM SERPL-SCNC: 141 MMOL/L
SPECIFIC GRAVITY URINE: 1.02
SQUAMOUS EPITHELIAL CELLS: 2 /HPF
TACROLIMUS SERPL-MCNC: 10.4 NG/ML
URATE SERPL-MCNC: 5.1 MG/DL
UROBILINOGEN URINE: NORMAL
WBC # FLD AUTO: 3.93 K/UL
WHITE BLOOD CELLS URINE: 5 /HPF

## 2019-04-23 LAB — BKV DNA SPEC QL NAA+PROBE: NOT DETECTED COPIES/ML

## 2019-04-24 LAB
CMV DNA SPEC QL NAA+PROBE: ABNORMAL IU/ML
CMVPCR LOG: 4.03 LOGIU/ML

## 2019-04-30 ENCOUNTER — APPOINTMENT (OUTPATIENT)
Dept: TRANSPLANT | Facility: CLINIC | Age: 69
End: 2019-04-30

## 2019-04-30 ENCOUNTER — OTHER (OUTPATIENT)
Age: 69
End: 2019-04-30

## 2019-04-30 ENCOUNTER — LABORATORY RESULT (OUTPATIENT)
Age: 69
End: 2019-04-30

## 2019-05-01 ENCOUNTER — OTHER (OUTPATIENT)
Age: 69
End: 2019-05-01

## 2019-05-01 ENCOUNTER — INPATIENT (INPATIENT)
Facility: HOSPITAL | Age: 69
LOS: 7 days | Discharge: HOME | DRG: 699 | End: 2019-05-09
Attending: TRANSPLANT SURGERY | Admitting: TRANSPLANT SURGERY
Payer: MEDICARE

## 2019-05-01 VITALS
SYSTOLIC BLOOD PRESSURE: 191 MMHG | TEMPERATURE: 98 F | DIASTOLIC BLOOD PRESSURE: 82 MMHG | HEART RATE: 102 BPM | RESPIRATION RATE: 20 BRPM | WEIGHT: 214.95 LBS | HEIGHT: 70 IN | OXYGEN SATURATION: 100 %

## 2019-05-01 DIAGNOSIS — N17.9 ACUTE KIDNEY FAILURE, UNSPECIFIED: ICD-10-CM

## 2019-05-01 DIAGNOSIS — E87.5 HYPERKALEMIA: ICD-10-CM

## 2019-05-01 DIAGNOSIS — D89.9 DISORDER INVOLVING THE IMMUNE MECHANISM, UNSPECIFIED: ICD-10-CM

## 2019-05-01 DIAGNOSIS — I10 ESSENTIAL (PRIMARY) HYPERTENSION: ICD-10-CM

## 2019-05-01 DIAGNOSIS — Z90.5 ACQUIRED ABSENCE OF KIDNEY: Chronic | ICD-10-CM

## 2019-05-01 DIAGNOSIS — Z98.890 OTHER SPECIFIED POSTPROCEDURAL STATES: Chronic | ICD-10-CM

## 2019-05-01 DIAGNOSIS — N39.0 URINARY TRACT INFECTION, SITE NOT SPECIFIED: ICD-10-CM

## 2019-05-01 DIAGNOSIS — Z94.0 KIDNEY TRANSPLANT STATUS: Chronic | ICD-10-CM

## 2019-05-01 DIAGNOSIS — E11.9 TYPE 2 DIABETES MELLITUS WITHOUT COMPLICATIONS: ICD-10-CM

## 2019-05-01 DIAGNOSIS — I77.0 ARTERIOVENOUS FISTULA, ACQUIRED: Chronic | ICD-10-CM

## 2019-05-01 LAB
ALBUMIN SERPL ELPH-MCNC: 3.5 G/DL — SIGNIFICANT CHANGE UP (ref 3.3–5)
ALP SERPL-CCNC: 106 U/L — SIGNIFICANT CHANGE UP (ref 40–120)
ALT FLD-CCNC: 33 U/L — SIGNIFICANT CHANGE UP (ref 10–45)
ANION GAP SERPL CALC-SCNC: 16 MMOL/L — SIGNIFICANT CHANGE UP (ref 5–17)
ANION GAP SERPL CALC-SCNC: 20 MMOL/L — HIGH (ref 5–17)
APPEARANCE UR: ABNORMAL
APTT BLD: 31.1 SEC — SIGNIFICANT CHANGE UP (ref 27.5–36.3)
AST SERPL-CCNC: 24 U/L — SIGNIFICANT CHANGE UP (ref 10–40)
BACTERIA # UR AUTO: ABNORMAL
BASE EXCESS BLDV CALC-SCNC: -7.5 MMOL/L — LOW (ref -2–2)
BASOPHILS # BLD AUTO: 0 K/UL — SIGNIFICANT CHANGE UP (ref 0–0.2)
BASOPHILS NFR BLD AUTO: 0 % — SIGNIFICANT CHANGE UP (ref 0–2)
BILIRUB SERPL-MCNC: 0.5 MG/DL — SIGNIFICANT CHANGE UP (ref 0.2–1.2)
BILIRUB UR-MCNC: NEGATIVE — SIGNIFICANT CHANGE UP
BUN SERPL-MCNC: 88 MG/DL — HIGH (ref 7–23)
BUN SERPL-MCNC: 89 MG/DL — HIGH (ref 7–23)
CA-I SERPL-SCNC: 1.23 MMOL/L — SIGNIFICANT CHANGE UP (ref 1.12–1.3)
CALCIUM SERPL-MCNC: 9.3 MG/DL — SIGNIFICANT CHANGE UP (ref 8.4–10.5)
CALCIUM SERPL-MCNC: 9.6 MG/DL — SIGNIFICANT CHANGE UP (ref 8.4–10.5)
CHLORIDE BLDV-SCNC: 96 MMOL/L — SIGNIFICANT CHANGE UP (ref 96–108)
CHLORIDE SERPL-SCNC: 91 MMOL/L — LOW (ref 96–108)
CHLORIDE SERPL-SCNC: 92 MMOL/L — LOW (ref 96–108)
CO2 BLDV-SCNC: 19 MMOL/L — LOW (ref 22–30)
CO2 SERPL-SCNC: 12 MMOL/L — LOW (ref 22–31)
CO2 SERPL-SCNC: 15 MMOL/L — LOW (ref 22–31)
COLOR SPEC: YELLOW — SIGNIFICANT CHANGE UP
CREAT SERPL-MCNC: 11.12 MG/DL — HIGH (ref 0.5–1.3)
CREAT SERPL-MCNC: 11.22 MG/DL — HIGH (ref 0.5–1.3)
DIFF PNL FLD: ABNORMAL
EOSINOPHIL # BLD AUTO: 0.1 K/UL — SIGNIFICANT CHANGE UP (ref 0–0.5)
EOSINOPHIL NFR BLD AUTO: 1.2 % — SIGNIFICANT CHANGE UP (ref 0–6)
EPI CELLS # UR: 2 — SIGNIFICANT CHANGE UP
GAS PNL BLDV: 125 MMOL/L — LOW (ref 136–145)
GAS PNL BLDV: SIGNIFICANT CHANGE UP
GLUCOSE BLDC GLUCOMTR-MCNC: 260 MG/DL — HIGH (ref 70–99)
GLUCOSE BLDC GLUCOMTR-MCNC: 293 MG/DL — HIGH (ref 70–99)
GLUCOSE BLDV-MCNC: 272 MG/DL — HIGH (ref 70–99)
GLUCOSE SERPL-MCNC: 248 MG/DL — HIGH (ref 70–99)
GLUCOSE SERPL-MCNC: 281 MG/DL — HIGH (ref 70–99)
GLUCOSE UR QL: NEGATIVE — SIGNIFICANT CHANGE UP
HCO3 BLDV-SCNC: 18 MMOL/L — LOW (ref 21–29)
HCT VFR BLD CALC: 31.3 % — LOW (ref 39–50)
HCT VFR BLDA CALC: 30 % — LOW (ref 39–50)
HGB BLD CALC-MCNC: 9.6 G/DL — LOW (ref 13–17)
HGB BLD-MCNC: 10.4 G/DL — LOW (ref 13–17)
HYALINE CASTS # UR AUTO: 2 /LPF — SIGNIFICANT CHANGE UP (ref 0–7)
INR BLD: 1.07 RATIO — SIGNIFICANT CHANGE UP (ref 0.88–1.16)
KETONES UR-MCNC: NEGATIVE — SIGNIFICANT CHANGE UP
LACTATE BLDV-MCNC: 0.9 MMOL/L — SIGNIFICANT CHANGE UP (ref 0.7–2)
LEUKOCYTE ESTERASE UR-ACNC: ABNORMAL
LYMPHOCYTES # BLD AUTO: 0.3 K/UL — LOW (ref 1–3.3)
LYMPHOCYTES # BLD AUTO: 3.9 % — LOW (ref 13–44)
MAGNESIUM SERPL-MCNC: 2 MG/DL — SIGNIFICANT CHANGE UP (ref 1.6–2.6)
MCHC RBC-ENTMCNC: 31.8 PG — SIGNIFICANT CHANGE UP (ref 27–34)
MCHC RBC-ENTMCNC: 33.1 GM/DL — SIGNIFICANT CHANGE UP (ref 32–36)
MCV RBC AUTO: 96 FL — SIGNIFICANT CHANGE UP (ref 80–100)
MONOCYTES # BLD AUTO: 0.2 K/UL — SIGNIFICANT CHANGE UP (ref 0–0.9)
MONOCYTES NFR BLD AUTO: 2 % — SIGNIFICANT CHANGE UP (ref 2–14)
NEUTROPHILS # BLD AUTO: 8.3 K/UL — HIGH (ref 1.8–7.4)
NEUTROPHILS NFR BLD AUTO: 92.9 % — HIGH (ref 43–77)
NITRITE UR-MCNC: NEGATIVE — SIGNIFICANT CHANGE UP
PCO2 BLDV: 40 MMHG — SIGNIFICANT CHANGE UP (ref 35–50)
PH BLDV: 7.28 — LOW (ref 7.35–7.45)
PH UR: 6 — SIGNIFICANT CHANGE UP (ref 5–8)
PHOSPHATE SERPL-MCNC: 5.4 MG/DL — HIGH (ref 2.5–4.5)
PLATELET # BLD AUTO: 155 K/UL — SIGNIFICANT CHANGE UP (ref 150–400)
PO2 BLDV: 26 MMHG — SIGNIFICANT CHANGE UP (ref 25–45)
POTASSIUM BLDV-SCNC: 6.1 MMOL/L — HIGH (ref 3.5–5.3)
POTASSIUM SERPL-MCNC: 5.5 MMOL/L — HIGH (ref 3.5–5.3)
POTASSIUM SERPL-MCNC: 6.1 MMOL/L — HIGH (ref 3.5–5.3)
POTASSIUM SERPL-SCNC: 5.5 MMOL/L — HIGH (ref 3.5–5.3)
POTASSIUM SERPL-SCNC: 6.1 MMOL/L — HIGH (ref 3.5–5.3)
PROT SERPL-MCNC: 7.2 G/DL — SIGNIFICANT CHANGE UP (ref 6–8.3)
PROT UR-MCNC: ABNORMAL
PROTHROM AB SERPL-ACNC: 12.3 SEC — SIGNIFICANT CHANGE UP (ref 10–12.9)
RBC # BLD: 3.26 M/UL — LOW (ref 4.2–5.8)
RBC # FLD: 15 % — HIGH (ref 10.3–14.5)
RBC CASTS # UR COMP ASSIST: 25 /HPF — SIGNIFICANT CHANGE UP (ref 0–4)
SAO2 % BLDV: 37 % — LOW (ref 67–88)
SODIUM SERPL-SCNC: 123 MMOL/L — LOW (ref 135–145)
SODIUM SERPL-SCNC: 123 MMOL/L — LOW (ref 135–145)
SP GR SPEC: 1.02 — SIGNIFICANT CHANGE UP (ref 1.01–1.02)
UROBILINOGEN FLD QL: NEGATIVE — SIGNIFICANT CHANGE UP
WBC # BLD: 8.9 K/UL — SIGNIFICANT CHANGE UP (ref 3.8–10.5)
WBC # FLD AUTO: 8.9 K/UL — SIGNIFICANT CHANGE UP (ref 3.8–10.5)
WBC UR QL: >50

## 2019-05-01 PROCEDURE — 76776 US EXAM K TRANSPL W/DOPPLER: CPT | Mod: 26,RT

## 2019-05-01 PROCEDURE — 99284 EMERGENCY DEPT VISIT MOD MDM: CPT

## 2019-05-01 RX ORDER — CEFEPIME 1 G/1
2000 INJECTION, POWDER, FOR SOLUTION INTRAMUSCULAR; INTRAVENOUS ONCE
Qty: 0 | Refills: 0 | Status: COMPLETED | OUTPATIENT
Start: 2019-05-01 | End: 2019-05-01

## 2019-05-01 RX ORDER — INSULIN LISPRO 100/ML
VIAL (ML) SUBCUTANEOUS
Qty: 0 | Refills: 0 | Status: DISCONTINUED | OUTPATIENT
Start: 2019-05-01 | End: 2019-05-09

## 2019-05-01 RX ORDER — SODIUM BICARBONATE 1 MEQ/ML
1300 SYRINGE (ML) INTRAVENOUS EVERY 8 HOURS
Qty: 0 | Refills: 0 | Status: DISCONTINUED | OUTPATIENT
Start: 2019-05-01 | End: 2019-05-01

## 2019-05-01 RX ORDER — INSULIN HUMAN 100 [IU]/ML
6 INJECTION, SOLUTION SUBCUTANEOUS ONCE
Qty: 0 | Refills: 0 | Status: COMPLETED | OUTPATIENT
Start: 2019-05-01 | End: 2019-05-01

## 2019-05-01 RX ORDER — GABAPENTIN 400 MG/1
300 CAPSULE ORAL DAILY
Qty: 0 | Refills: 0 | Status: DISCONTINUED | OUTPATIENT
Start: 2019-05-01 | End: 2019-05-05

## 2019-05-01 RX ORDER — SODIUM CHLORIDE 9 MG/ML
1000 INJECTION, SOLUTION INTRAVENOUS
Qty: 0 | Refills: 0 | Status: DISCONTINUED | OUTPATIENT
Start: 2019-05-01 | End: 2019-05-01

## 2019-05-01 RX ORDER — CEFEPIME 1 G/1
1000 INJECTION, POWDER, FOR SOLUTION INTRAMUSCULAR; INTRAVENOUS ONCE
Qty: 0 | Refills: 0 | Status: DISCONTINUED | OUTPATIENT
Start: 2019-05-01 | End: 2019-05-01

## 2019-05-01 RX ORDER — METOPROLOL TARTRATE 50 MG
50 TABLET ORAL DAILY
Qty: 0 | Refills: 0 | Status: DISCONTINUED | OUTPATIENT
Start: 2019-05-02 | End: 2019-05-09

## 2019-05-01 RX ORDER — SODIUM CHLORIDE 9 MG/ML
500 INJECTION INTRAMUSCULAR; INTRAVENOUS; SUBCUTANEOUS ONCE
Qty: 0 | Refills: 0 | Status: COMPLETED | OUTPATIENT
Start: 2019-05-01 | End: 2019-05-01

## 2019-05-01 RX ORDER — NIFEDIPINE 30 MG
30 TABLET, EXTENDED RELEASE 24 HR ORAL DAILY
Qty: 0 | Refills: 0 | Status: DISCONTINUED | OUTPATIENT
Start: 2019-05-01 | End: 2019-05-09

## 2019-05-01 RX ORDER — CALCIUM GLUCONATE 100 MG/ML
1 VIAL (ML) INTRAVENOUS ONCE
Qty: 0 | Refills: 0 | Status: COMPLETED | OUTPATIENT
Start: 2019-05-01 | End: 2019-05-01

## 2019-05-01 RX ORDER — DEXTROSE 50 % IN WATER 50 %
25 SYRINGE (ML) INTRAVENOUS ONCE
Qty: 0 | Refills: 0 | Status: DISCONTINUED | OUTPATIENT
Start: 2019-05-01 | End: 2019-05-09

## 2019-05-01 RX ORDER — SODIUM BICARBONATE 1 MEQ/ML
0.12 SYRINGE (ML) INTRAVENOUS
Qty: 75 | Refills: 0 | Status: DISCONTINUED | OUTPATIENT
Start: 2019-05-01 | End: 2019-05-02

## 2019-05-01 RX ORDER — GANCICLOVIR SODIUM 50 MG/ML
INJECTION, POWDER, LYOPHILIZED, FOR SOLUTION INTRAVENTRICULAR
Qty: 0 | Refills: 0 | Status: DISCONTINUED | OUTPATIENT
Start: 2019-05-01 | End: 2019-05-01

## 2019-05-01 RX ORDER — INSULIN LISPRO 100/ML
VIAL (ML) SUBCUTANEOUS EVERY 6 HOURS
Qty: 0 | Refills: 0 | Status: DISCONTINUED | OUTPATIENT
Start: 2019-05-01 | End: 2019-05-01

## 2019-05-01 RX ORDER — GANCICLOVIR SODIUM 50 MG/ML
120 INJECTION, POWDER, LYOPHILIZED, FOR SOLUTION INTRAVENTRICULAR ONCE
Qty: 0 | Refills: 0 | Status: COMPLETED | OUTPATIENT
Start: 2019-05-01 | End: 2019-05-01

## 2019-05-01 RX ORDER — CEFEPIME 1 G/1
1000 INJECTION, POWDER, FOR SOLUTION INTRAMUSCULAR; INTRAVENOUS EVERY 24 HOURS
Qty: 0 | Refills: 0 | Status: DISCONTINUED | OUTPATIENT
Start: 2019-05-02 | End: 2019-05-09

## 2019-05-01 RX ORDER — SODIUM BICARBONATE 1 MEQ/ML
50 SYRINGE (ML) INTRAVENOUS ONCE
Qty: 0 | Refills: 0 | Status: COMPLETED | OUTPATIENT
Start: 2019-05-01 | End: 2019-05-01

## 2019-05-01 RX ORDER — ACETAMINOPHEN 500 MG
1000 TABLET ORAL ONCE
Qty: 0 | Refills: 0 | Status: COMPLETED | OUTPATIENT
Start: 2019-05-01 | End: 2019-05-01

## 2019-05-01 RX ORDER — ONDANSETRON 8 MG/1
4 TABLET, FILM COATED ORAL EVERY 6 HOURS
Qty: 0 | Refills: 0 | Status: DISCONTINUED | OUTPATIENT
Start: 2019-05-01 | End: 2019-05-09

## 2019-05-01 RX ORDER — GANCICLOVIR SODIUM 50 MG/ML
120 INJECTION, POWDER, LYOPHILIZED, FOR SOLUTION INTRAVENTRICULAR DAILY
Qty: 0 | Refills: 0 | Status: DISCONTINUED | OUTPATIENT
Start: 2019-05-02 | End: 2019-05-02

## 2019-05-01 RX ORDER — INSULIN LISPRO 100/ML
VIAL (ML) SUBCUTANEOUS AT BEDTIME
Qty: 0 | Refills: 0 | Status: DISCONTINUED | OUTPATIENT
Start: 2019-05-01 | End: 2019-05-09

## 2019-05-01 RX ORDER — CEFEPIME 1 G/1
INJECTION, POWDER, FOR SOLUTION INTRAMUSCULAR; INTRAVENOUS
Qty: 0 | Refills: 0 | Status: DISCONTINUED | OUTPATIENT
Start: 2019-05-01 | End: 2019-05-01

## 2019-05-01 RX ORDER — VALGANCICLOVIR 450 MG/1
450 TABLET, FILM COATED ORAL
Qty: 0 | Refills: 0 | Status: DISCONTINUED | OUTPATIENT
Start: 2019-05-01 | End: 2019-05-01

## 2019-05-01 RX ORDER — ALBUTEROL 90 UG/1
2.5 AEROSOL, METERED ORAL ONCE
Qty: 0 | Refills: 0 | Status: COMPLETED | OUTPATIENT
Start: 2019-05-01 | End: 2019-05-01

## 2019-05-01 RX ADMIN — Medication 2: at 23:03

## 2019-05-01 RX ADMIN — ALBUTEROL 2.5 MILLIGRAM(S): 90 AEROSOL, METERED ORAL at 17:09

## 2019-05-01 RX ADMIN — GANCICLOVIR SODIUM 100 MILLIGRAM(S): 50 INJECTION, POWDER, LYOPHILIZED, FOR SOLUTION INTRAVENTRICULAR at 23:05

## 2019-05-01 RX ADMIN — Medication 50 MILLIEQUIVALENT(S): at 17:47

## 2019-05-01 RX ADMIN — Medication 200 GRAM(S): at 17:47

## 2019-05-01 RX ADMIN — CEFEPIME 100 MILLIGRAM(S): 1 INJECTION, POWDER, FOR SOLUTION INTRAMUSCULAR; INTRAVENOUS at 20:30

## 2019-05-01 RX ADMIN — Medication 400 MILLIGRAM(S): at 22:06

## 2019-05-01 RX ADMIN — INSULIN HUMAN 6 UNIT(S): 100 INJECTION, SOLUTION SUBCUTANEOUS at 16:57

## 2019-05-01 RX ADMIN — SODIUM CHLORIDE 1000 MILLILITER(S): 9 INJECTION INTRAMUSCULAR; INTRAVENOUS; SUBCUTANEOUS at 17:00

## 2019-05-01 RX ADMIN — Medication 150 MEQ/KG/HR: at 20:38

## 2019-05-01 RX ADMIN — Medication 6: at 20:55

## 2019-05-01 NOTE — ED PROVIDER NOTE - ATTENDING CONTRIBUTION TO CARE
Private Physician Livia Sinclair Nephro  68y male pmh BPH, SBO, ESRD on dialysis until transplant 7/2018, Heb c, HTN,DMT2, Pt had complains of left knee pain past two months treated with "move easy" PT has complains of weakness/fatigue past 1-2 weeks seen at pmd office had Creat of 5d ago. Seen yesterday creat 9.8,. Referred to ed no cp.sob,palps. nvdc,cp.cough fever and chills abd pain. PE WDWN male nad normocephalic atraumatic chest clear anterior & posterior abd soft  +bs no mass guarding, Mass rlq cw transplanted kidney nontender.neruo no focal defects cv no rmg  Florentino Chris MD, [facility]

## 2019-05-01 NOTE — ED ADULT NURSE NOTE - OBJECTIVE STATEMENT
Pt is a 69 yo male who was sent here from his nephrologist. Pt is co chills and subjective fevers at home. He denies any CP or SOB no N/V/D no cough or dizziness. Pt had a kidney transplant done 1 year ago he was on HD till last July until the transplant, Pts urine appears cloudy. + increased weakness and b/l knee pain. Pts MD sent him here concerned for rejection.

## 2019-05-01 NOTE — H&P ADULT - NSICDXPASTSURGICALHX_GEN_ALL_CORE_FT
PAST SURGICAL HISTORY:  AV fistula 2013/ left forearm    H/O ileostomy 2017   for 3 months. s/p Reversal    Kidney transplant recipient 2018  @ [facility] :  +  Hep C Donor    S/p nephrectomy left 9/15/2015   + Cancer    S/p nephrectomy right 12/10/2015   benign    S/P right knee arthroscopy

## 2019-05-01 NOTE — H&P ADULT - PROBLEM SELECTOR PLAN 4
- Will hold home tacro/MMF for now  - Will obtain Tacro level in AM and plan to restart tomorrow. - Will hold home tacro/MMF for now  - Will obtain Tacro level in AM and plan to restart tomorrow.  - Continue home Prednisone 5qd

## 2019-05-01 NOTE — H&P ADULT - NSICDXPASTMEDICALHX_GEN_ALL_CORE_FT
PAST MEDICAL HISTORY:  Anal fissure dx: ' 2018   No surgery    Benign prostatic hypertrophy     Bowel obstruction ' 2017   surgically treated    ESRD (end stage renal disease) On Dialysis ' 2015 to 7/2018    Hepatitis C In Donor Kidney: patient received treatment for Hep. C : post treatment: patient  tested Negative for Hep C    HTN (hypertension)     Kidney neoplasm dx: ' 2015 : Left      s/p bilateral Nephrectomy ' 2015    Medial meniscus tear ' 90's  Right    Type 2 diabetes mellitus dx: '88

## 2019-05-01 NOTE — ED PROVIDER NOTE - PSH
AV fistula  2013/ left forearm  H/O ileostomy  '    for 3 months. s/p Reversal  Kidney transplant recipient  2018  @ [facility] :  +  Hep C Donor  S/p nephrectomy  right 12/10/2015   benign  S/p nephrectomy  left 9/15/2015   + Cancer  S/P right knee arthroscopy

## 2019-05-01 NOTE — CONSULT NOTE ADULT - SUBJECTIVE AND OBJECTIVE BOX
[facility] DIVISION OF KIDNEY DISEASES AND HYPERTENSION -- INITIAL CONSULT NOTE  --------------------------------------------------------------------------------  HPI:  Mr. Medrano is a 67 yrs old male ESRD secondary to DM2 (HD since 2015) s/p  donor kidney transplant on 18 who presents to ER with CHELA found on outpatient labs.    Kidney transplant was from an HCV donor. 40 yrs old male. cold ischemia time 23 hrs. delayed graft function.   Post transplant course was complicated by CHELA required 2 kidney biopsies which revealed few oxalate crystals and borderline rejection for which he was treated with solumedrol 375 and calcium for hyperoxalaturia.  Pt also had a ureteral stent placed for possible hydronephrosis then removed.  After removal creatinine was stable until he developed sepsis from Ecoli UTI.  Stent did not need to be replaced.   About 3 weeks ago pt developed constipation and some pelvic discomfort and was placed on bactrim DS by urgent care.  His creatinine jeison to 3's.  As an outpt bactrim was stopped and labs last week revealed creatinine of 2.4.  Pt was also started on valcyte for CMV pcr of 10,000.  Pt at the time had no dysuria and urine culture on  was negative.  Over last few days pt has been feeling weak.  He has chronic diarrhea but notes it being a little worse.  No dysuria, no fevers or chills. Had repeat outpatient labs with creatinine on  and sent to ER  In ER creatinine is 11 and K is 6.1.  Pt being treated with 500cc NS, D50, insulin for hyperkalemia.  Armenta was placed and urine analysis consistent with UTI.  Sodium is 123        PAST HISTORY  --------------------------------------------------------------------------------  PAST MEDICAL & SURGICAL HISTORY:  Medial meniscus tear: &#x27; 90&#x27;s  Right  Bowel obstruction: &#x27; 2017   surgically treated  Anal fissure: dx: &#x27; 2018   No surgery  Benign prostatic hypertrophy  Hepatitis C: In Donor Kidney: patient received treatment for Hep. C : post treatment: patient  tested Negative for Hep C  Kidney neoplasm: dx: &#x27; 2015 : Left      s/p bilateral Nephrectomy &#x27;   ESRD (end stage renal disease): On Dialysis &#x27;  to 2018  Type 2 diabetes mellitus: dx: &#x27;88  HTN (hypertension)  Kidney transplant recipient: 2018  @ [facility] :  +  Hep C Donor  S/P right knee arthroscopy: &#x27; 90&#x27;s  H/O ileostomy: &#x27; 2017   for 3 months. 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    FAMILY HISTORY:  No pertinent family history in first degree relatives    PAST SOCIAL HISTORY:    ALLERGIES & MEDICATIONS  --------------------------------------------------------------------------------  Allergies    No Known Allergies    Intolerances      Standing Inpatient Medications  calcium gluconate IVPB 1 Gram(s) IV Intermittent Once  cefepime   IVPB 2000 milliGRAM(s) IV Intermittent once  sodium bicarbonate 1300 milliGRAM(s) Oral every 8 hours  sodium bicarbonate  Injectable 50 milliEquivalent(s) IV Push Once  sodium chloride 0.45% 1000 milliLiter(s) IV Continuous <Continuous>  valGANciclovir 450 milliGRAM(s) Oral <User Schedule>    PRN Inpatient Medications  ondansetron Injectable 4 milliGRAM(s) IV Push every 6 hours PRN      REVIEW OF SYSTEMS  --------------------------------------------------------------------------------  Gen: feels weak  Skin: No rashes  Head/Eyes/Ears/Mouth: No headache; Normal hearing; Normal vision w/o blurriness; No sinus pain/discomfort, sore throat  Respiratory: No dyspnea, cough, wheezing, hemoptysis  CV: No chest pain, PND, orthopnea  GI: diarrhea  : armenta in place  MSK: No joint pain/swelling; no back pain; no edema  Neuro: No dizziness/lightheadedness, weakness, seizures, numbness, tingling  Heme: No easy bruising or bleeding  Endo: No heat/cold intolerance  Psych: No significant nervousness, anxiety, stress, depression    All other systems were reviewed and are negative, except as noted.    VITALS/PHYSICAL EXAM  --------------------------------------------------------------------------------  T(C): 36.7 (19 @ 13:54), Max: 36.7 (19 @ 13:54)  HR: 106 (19 @ 14:54) (102 - 106)  BP: 165/87 (19 @ 14:54) (165/87 - 191/82)  RR: 17 (19 14:54) (17 - 20)  SpO2: 100% (19 14:54) (100% - 100%)  Wt(kg): --  Height (cm): 177.8 (19 @ 13:54)  Weight (kg): 97.5 (19 @ 13:54)  BMI (kg/m2): 30.8 (19 @ 13:54)  BSA (m2): 2.15 (19 @ 13:54)      Physical Exam:  	Gen: NAD, appears tired  	HEENT: PERRL, supple neck, clear oropharynx  	Pulm: CTA B/L  	CV: RRR, S1S2; no rub  	Back: No spinal or CVA tenderness; no sacral edema  	Abd: +BS, soft, nontender/nondistended                      Transplant: non tender  	: No suprapubic tenderness  	UE: Warm, FROM, no clubbing, intact strength; no edema; no asterixis  	LE: Warm, FROM, no clubbing, intact strength; no edema  	Neuro: No focal deficits, intact gait  	Psych: Normal affect and mood  	Skin: Warm, without rashes  	Vascular access: Left UE AVF    LABS/STUDIES  --------------------------------------------------------------------------------              10.4   8.9   >-----------<  155      [19 @ 14:49]              31.3     123  |  91  |  89  ----------------------------<  281      [19 @ 14:49]  6.1   |  12  |  11.22        Ca     9.6     [19 14:49]    TPro  7.2  /  Alb  3.5  /  TBili  0.5  /  DBili  x   /  AST  24  /  ALT  33  /  AlkPhos  106  [19 @ 14:49]    PT/INR: PT 12.3 , INR 1.07       [19 15:31]  PTT: 31.1       [19 15:31]      Creatinine Trend:  SCr 11.22 [ 14:49]    Urinalysis - [19 15:31]      Color Yellow / Appearance Turbid / SG 1.020 / pH 6.0      Gluc Negative / Ketone Negative  / Bili Negative / Urobili Negative       Blood Moderate / Protein 300 mg/dL / Leuk Est Large / Nitrite Negative      RBC 25 / WBC >50 / Hyaline 2 / Gran  / Sq Epi  / Non Sq Epi 2 / Bacteria TNTC      HbA1c 5.8      [19 @ 09:45]    HBsAb 5.6      [18 @ 19:20]  HBsAg Nonreact      [08-15-18 @ 14:37]  HBcAb Nonreact      [18 @ 19:20]  HCV 0.10, Nonreact      [08-15-18 @ 14:37]  HIV Nonreact      [08-15-18 @ 14:37]      Tacrolimus  Cyclosporine  Sirolimus  Mycophenolate  BK PCR  CMV PCRCMVPCR Log: NotDetec LogIU/mL ( @ 08:58)    Parvo PCR  EBV PCR

## 2019-05-01 NOTE — ED PROVIDER NOTE - PROGRESS NOTE DETAILS
Endorsed to Dr Charles Chris MD, [facility] renal team at bedside. aware of the Potassium along with acute renal failure. pt to be admitted to [address] [place] [address] under Dr. Yao. aware of the hyperK treatment.

## 2019-05-01 NOTE — H&P ADULT - HISTORY OF PRESENT ILLNESS
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  Now presenting to [facility] ED with weakness, malaise, poor appetite, decreasing urine volume.  Of note, he has chronic diarrhea and was recently treated for another UTI with PO Bactrim of which he last took one week prior.      Previous outpatient transplanted renal ultrasound (1/22/19) found elevated velocities with scattered tardus parvus waveforms.

## 2019-05-01 NOTE — ED ADULT NURSE REASSESSMENT NOTE - NS ED NURSE REASSESS COMMENT FT1
IV access was attempted via ultrasound by ED physician x 2 , first IV placed in upper right arm became infiltrated and was removed. Second attempt was placed in right forearm, flushes well. Patient was given a sandwich and soda since he received IV insulin for hyperkalemia prior to first IV infiltrating. Armenta catheter to bedside drainage was placed with RN Mary at bedside for critical care monitoring of possible renal transplant rejection, with elevated creatinine and potassium. Sterile technique was used and 16 [place] armenta inserted with no difficulty, clear yellow urine obtained.

## 2019-05-01 NOTE — H&P ADULT - PROBLEM SELECTOR PLAN 1
- Cr now elevated to 11.22, Bicarb down to 12 with hypoNa (123) and hyperK (6.1) without EKG changes.  - HNS w/75mEq NaHCO3 @ 150cc/hr  - STAT transplant renal U/S  - Recently found to be CMV+ and started on PO Valganciclovir will check PCR and start IV Valganciclovir.  - Would appreciate further recs from transplant nephrology - Cr now elevated to 11.22, Bicarb down to 12 with hypoNa (123) and hyperK (6.1) without EKG changes.  - HNS w/150mEq NaHCO3 @ 150cc/hr  - STAT transplant renal U/S  - Recently found to be CMV+ and started on PO Valganciclovir will check PCR and start IV ganciclovir.  - Would appreciate further recs from transplant nephrology

## 2019-05-01 NOTE — CONSULT NOTE ADULT - ASSESSMENT
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    1.  Renal transplant - CHELA possibly from pyelonephritis, obstruction and or dehydration.  Sodium and bicarb low.  After bolus start 1/2 NS with 150meq Nabicarb @ 150cc/hr.  Repeat labs in 4 hours.  Check renal sono with duplex and continue armenta catheter.  2.  CHELA - as above  3.  Immunosuppression - hold tonight's dose of MMF and tacrolimus.  Check tacro trough in the morning and continue prednisone 5mgs daily.    4.  UTI/ pyelonephritis - start cefepime and give 1gm vancomycin.  Last EColi sensitive to cefepime.  Check urine culture.  5.  Hyponatremia - likely hypovolemic hyponatremia.  Cont isotonic fluids, check CMP in 4 hours, check urine lytes and osmolality.  6.  HCV - genotype 1a s/p epclusa treatment with cure.  7.  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  Stop valcyte and start IV ganciclovir.  Check repeat CMV pcr.    8.  DM2 - was on januvia and glimepiride.  Hold for now and cover with sliding scale.  9.  Oxalaturia - hold calcium and bicitra for now.

## 2019-05-01 NOTE — H&P ADULT - ASSESSMENT
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

## 2019-05-01 NOTE — H&P ADULT - NSHPLABSRESULTS_GEN_ALL_CORE
10.4   8.9   )-----------( 155      ( 01 May 2019 14:49 )             31.3   05-01    123<L>  |  91<L>  |  89<H>  ----------------------------<  281<H>  6.1<H>   |  12<L>  |  11.22<H>    Ca    9.6      01 May 2019 14:49    TPro  7.2  /  Alb  3.5  /  TBili  0.5  /  DBili  x   /  AST  24  /  ALT  33  /  AlkPhos  106  05-  Urinalysis Basic - ( 01 May 2019 15:31 )    Color: Yellow / Appearance: Turbid / S.020 / pH: x  Gluc: x / Ketone: Negative  / Bili: Negative / Urobili: Negative   Blood: x / Protein: 300 mg/dL / Nitrite: Negative   Leuk Esterase: Large / RBC: 25 /hpf / WBC >50   Sq Epi: x / Non Sq Epi: 2 / Bacteria: TNTC

## 2019-05-01 NOTE — ED PROVIDER NOTE - OBJECTIVE STATEMENT
Private Physician Livia  68y male pmh 68yom pmhx ESRD secondary to bilateral nephrectomy from neoplasm s/p renal transplant 7/2018, HTN, and NIDDM complicated by ATN/mild rejection (8/2017), renal artery stenosis requiring stenting, and perinephric collection requiring drainage. Last admission in March for weakness here today for increasing cx level in office, concerning for rejection.      Private Physician Livia  68y male pmh

## 2019-05-01 NOTE — H&P ADULT - NSHPPHYSICALEXAM_GEN_ALL_CORE
Vital Signs Last 24 Hrs  T(C): 36.7 (01 May 2019 13:54), Max: 36.7 (01 May 2019 13:54)  T(F): 98 (01 May 2019 13:54), Max: 98 (01 May 2019 13:54)  HR: 102 (01 May 2019 17:20) (102 - 106)  BP: 141/76 (01 May 2019 17:20) (141/76 - 191/82)  BP(mean): --  RR: 18 (01 May 2019 17:20) (17 - 20)  SpO2: 100% (01 May 2019 17:20) (100% - 100%)    Gen: Age appropriate male in NAD  Neuro: A&Ox3, moving all 4 extremities against gravity, no asterixis  CV: tachycardic, S1S2  Resp: No increased work of breathing, CTAB to bases  Abd: Multiple well healed incisions, soft, nontender, nondistended. 3x3cm reducible ventral hernia associated with prior midline ex lap scar.  Ext: WWP without any appreciable edema  Vasc: palp distal pulses throughout, LUE radiocephalic AVF with palpable thrill

## 2019-05-02 DIAGNOSIS — Z94.0 KIDNEY TRANSPLANT STATUS: ICD-10-CM

## 2019-05-02 DIAGNOSIS — N14.1 NEPHROPATHY INDUCED BY OTHER DRUGS, MEDICAMENTS AND BIOLOGICAL SUBSTANCES: ICD-10-CM

## 2019-05-02 LAB
ALBUMIN SERPL ELPH-MCNC: 3.9 G/DL
ALP BLD-CCNC: 110 U/L
ALT SERPL-CCNC: 37 U/L
ANION GAP SERPL CALC-SCNC: 16 MMOL/L
ANION GAP SERPL CALC-SCNC: 18 MMOL/L — HIGH (ref 5–17)
ANION GAP SERPL CALC-SCNC: 19 MMOL/L — HIGH (ref 5–17)
APPEARANCE: ABNORMAL
APTT BLD: 28 SEC — SIGNIFICANT CHANGE UP (ref 27.5–36.3)
AST SERPL-CCNC: 31 U/L
BACTERIA: ABNORMAL
BASOPHILS # BLD AUTO: 0.02 K/UL
BASOPHILS NFR BLD AUTO: 0.2 %
BILIRUB SERPL-MCNC: 0.5 MG/DL
BILIRUBIN URINE: NEGATIVE
BKV DNA SPEC QL NAA+PROBE: NOT DETECTED COPIES/ML
BLOOD URINE: ABNORMAL
BUN SERPL-MCNC: 78 MG/DL
BUN SERPL-MCNC: 89 MG/DL — HIGH (ref 7–23)
BUN SERPL-MCNC: 97 MG/DL — HIGH (ref 7–23)
CALCIUM SERPL-MCNC: 9.1 MG/DL
CALCIUM SERPL-MCNC: 9.4 MG/DL — SIGNIFICANT CHANGE UP (ref 8.4–10.5)
CALCIUM SERPL-MCNC: 9.6 MG/DL — SIGNIFICANT CHANGE UP (ref 8.4–10.5)
CHLORIDE SERPL-SCNC: 91 MMOL/L — LOW (ref 96–108)
CHLORIDE SERPL-SCNC: 91 MMOL/L — LOW (ref 96–108)
CHLORIDE SERPL-SCNC: 96 MMOL/L
CMV DNA CSF QL NAA+PROBE: SIGNIFICANT CHANGE UP
CMV DNA SPEC NAA+PROBE-LOG#: 4.35 LOGIU/ML — HIGH
CMV DNA SPEC QL NAA+PROBE: ABNORMAL IU/ML
CMV IGG SERPL QL: 0.49 U/ML
CMV IGG SERPL-IMP: NEGATIVE
CMVPCR LOG: 4.13 LOGIU/ML
CO2 SERPL-SCNC: 15 MMOL/L — LOW (ref 22–31)
CO2 SERPL-SCNC: 16 MMOL/L
CO2 SERPL-SCNC: 17 MMOL/L — LOW (ref 22–31)
COLOR: YELLOW
CREAT SERPL-MCNC: 11.2 MG/DL — HIGH (ref 0.5–1.3)
CREAT SERPL-MCNC: 11.63 MG/DL — HIGH (ref 0.5–1.3)
CREAT SERPL-MCNC: 9.82 MG/DL
CREAT SPEC-SCNC: 254 MG/DL
CREAT/PROT UR: 4.2 RATIO
EOSINOPHIL # BLD AUTO: 0.02 K/UL
EOSINOPHIL NFR BLD AUTO: 0.2 %
GLUCOSE BLDC GLUCOMTR-MCNC: 190 MG/DL — HIGH (ref 70–99)
GLUCOSE BLDC GLUCOMTR-MCNC: 203 MG/DL — HIGH (ref 70–99)
GLUCOSE BLDC GLUCOMTR-MCNC: 239 MG/DL — HIGH (ref 70–99)
GLUCOSE BLDC GLUCOMTR-MCNC: 259 MG/DL — HIGH (ref 70–99)
GLUCOSE QUALITATIVE U: NORMAL
GLUCOSE SERPL-MCNC: 160 MG/DL — HIGH (ref 70–99)
GLUCOSE SERPL-MCNC: 206 MG/DL
GLUCOSE SERPL-MCNC: 238 MG/DL — HIGH (ref 70–99)
HBA1C BLD-MCNC: 6.4 % — HIGH (ref 4–5.6)
HCT VFR BLD CALC: 28.7 % — LOW (ref 39–50)
HCT VFR BLD CALC: 30.4 %
HCT VFR BLD CALC: 32.4 % — LOW (ref 39–50)
HGB BLD-MCNC: 10.9 G/DL — LOW (ref 13–17)
HGB BLD-MCNC: 9.4 G/DL
HGB BLD-MCNC: 9.8 G/DL — LOW (ref 13–17)
HYALINE CASTS: 0 /LPF
IMM GRANULOCYTES NFR BLD AUTO: 2.2 %
KETONES URINE: NEGATIVE
LDH SERPL-CCNC: 216 U/L
LEUKOCYTE ESTERASE URINE: ABNORMAL
LYMPHOCYTES # BLD AUTO: 0.3 K/UL
LYMPHOCYTES NFR BLD AUTO: 3.5 %
MAGNESIUM SERPL-MCNC: 1.9 MG/DL
MAGNESIUM SERPL-MCNC: 2 MG/DL — SIGNIFICANT CHANGE UP (ref 1.6–2.6)
MAN DIFF?: NORMAL
MCHC RBC-ENTMCNC: 29.9 PG
MCHC RBC-ENTMCNC: 30.9 GM/DL
MCHC RBC-ENTMCNC: 31.4 PG — SIGNIFICANT CHANGE UP (ref 27–34)
MCHC RBC-ENTMCNC: 31.5 PG — SIGNIFICANT CHANGE UP (ref 27–34)
MCHC RBC-ENTMCNC: 33.6 GM/DL — SIGNIFICANT CHANGE UP (ref 32–36)
MCHC RBC-ENTMCNC: 34 GM/DL — SIGNIFICANT CHANGE UP (ref 32–36)
MCV RBC AUTO: 92.5 FL — SIGNIFICANT CHANGE UP (ref 80–100)
MCV RBC AUTO: 93.7 FL — SIGNIFICANT CHANGE UP (ref 80–100)
MCV RBC AUTO: 96.8 FL
MICROSCOPIC-UA: NORMAL
MONOCYTES # BLD AUTO: 0.18 K/UL
MONOCYTES NFR BLD AUTO: 2.1 %
NEUTROPHILS # BLD AUTO: 7.76 K/UL
NEUTROPHILS NFR BLD AUTO: 91.8 %
NITRITE URINE: NEGATIVE
PH URINE: 6.5
PHOSPHATE SERPL-MCNC: 4.4 MG/DL
PHOSPHATE SERPL-MCNC: 5.8 MG/DL — HIGH (ref 2.5–4.5)
PLATELET # BLD AUTO: 132 K/UL
PLATELET # BLD AUTO: SIGNIFICANT CHANGE UP (ref 150–400)
PLATELET # BLD AUTO: SIGNIFICANT CHANGE UP (ref 150–400)
POTASSIUM SERPL-MCNC: 5.4 MMOL/L — HIGH (ref 3.5–5.3)
POTASSIUM SERPL-MCNC: 5.6 MMOL/L — HIGH (ref 3.5–5.3)
POTASSIUM SERPL-SCNC: 5.4 MMOL/L
POTASSIUM SERPL-SCNC: 5.4 MMOL/L — HIGH (ref 3.5–5.3)
POTASSIUM SERPL-SCNC: 5.6 MMOL/L — HIGH (ref 3.5–5.3)
PROT SERPL-MCNC: 6.8 G/DL
PROT UR-MCNC: 1075 MG/DL
PROTEIN URINE: ABNORMAL
RBC # BLD: 3.1 M/UL — LOW (ref 4.2–5.8)
RBC # BLD: 3.14 M/UL
RBC # BLD: 3.46 M/UL — LOW (ref 4.2–5.8)
RBC # FLD: 14.8 % — HIGH (ref 10.3–14.5)
RBC # FLD: 14.8 % — HIGH (ref 10.3–14.5)
RBC # FLD: 15.3 %
RED BLOOD CELLS URINE: 17 /HPF
SODIUM SERPL-SCNC: 124 MMOL/L — LOW (ref 135–145)
SODIUM SERPL-SCNC: 127 MMOL/L — LOW (ref 135–145)
SODIUM SERPL-SCNC: 128 MMOL/L
SPECIFIC GRAVITY URINE: 1.02
SQUAMOUS EPITHELIAL CELLS: 4 /HPF
TACROLIMUS SERPL-MCNC: 23.3 NG/ML
TACROLIMUS SERPL-MCNC: 23.3 NG/ML — SIGNIFICANT CHANGE UP
TACROLIMUS SERPL-MCNC: 39.2 NG/ML — SIGNIFICANT CHANGE UP
URATE SERPL-MCNC: 8.5 MG/DL
UROBILINOGEN URINE: NORMAL
WBC # BLD: 12.2 K/UL — HIGH (ref 3.8–10.5)
WBC # BLD: 7.7 K/UL — SIGNIFICANT CHANGE UP (ref 3.8–10.5)
WBC # FLD AUTO: 12.2 K/UL — HIGH (ref 3.8–10.5)
WBC # FLD AUTO: 7.7 K/UL — SIGNIFICANT CHANGE UP (ref 3.8–10.5)
WBC # FLD AUTO: 8.47 K/UL
WHITE BLOOD CELLS URINE: >720 /HPF

## 2019-05-02 PROCEDURE — 99232 SBSQ HOSP IP/OBS MODERATE 35: CPT | Mod: GC,24

## 2019-05-02 PROCEDURE — 71045 X-RAY EXAM CHEST 1 VIEW: CPT | Mod: 26

## 2019-05-02 PROCEDURE — 99233 SBSQ HOSP IP/OBS HIGH 50: CPT

## 2019-05-02 RX ORDER — HEPARIN SODIUM 5000 [USP'U]/ML
5000 INJECTION INTRAVENOUS; SUBCUTANEOUS EVERY 12 HOURS
Qty: 0 | Refills: 0 | Status: DISCONTINUED | OUTPATIENT
Start: 2019-05-02 | End: 2019-05-09

## 2019-05-02 RX ORDER — CHLORHEXIDINE GLUCONATE 213 G/1000ML
1 SOLUTION TOPICAL EVERY 12 HOURS
Qty: 0 | Refills: 0 | Status: DISCONTINUED | OUTPATIENT
Start: 2019-05-02 | End: 2019-05-09

## 2019-05-02 RX ORDER — GANCICLOVIR SODIUM 50 MG/ML
60 INJECTION, POWDER, LYOPHILIZED, FOR SOLUTION INTRAVENTRICULAR DAILY
Qty: 0 | Refills: 0 | Status: DISCONTINUED | OUTPATIENT
Start: 2019-05-02 | End: 2019-05-09

## 2019-05-02 RX ORDER — DIPHENOXYLATE HCL/ATROPINE 2.5-.025MG
2 TABLET ORAL THREE TIMES A DAY
Qty: 0 | Refills: 0 | Status: DISCONTINUED | OUTPATIENT
Start: 2019-05-02 | End: 2019-05-08

## 2019-05-02 RX ORDER — ACETAMINOPHEN 500 MG
650 TABLET ORAL ONCE
Qty: 0 | Refills: 0 | Status: COMPLETED | OUTPATIENT
Start: 2019-05-02 | End: 2019-05-02

## 2019-05-02 RX ORDER — ACETAMINOPHEN 500 MG
1000 TABLET ORAL ONCE
Qty: 0 | Refills: 0 | Status: COMPLETED | OUTPATIENT
Start: 2019-05-02 | End: 2019-05-02

## 2019-05-02 RX ADMIN — Medication 2: at 21:43

## 2019-05-02 RX ADMIN — Medication 30 MILLIGRAM(S): at 05:31

## 2019-05-02 RX ADMIN — HEPARIN SODIUM 5000 UNIT(S): 5000 INJECTION INTRAVENOUS; SUBCUTANEOUS at 17:21

## 2019-05-02 RX ADMIN — Medication 4: at 12:46

## 2019-05-02 RX ADMIN — Medication 650 MILLIGRAM(S): at 17:10

## 2019-05-02 RX ADMIN — CHLORHEXIDINE GLUCONATE 1 APPLICATION(S): 213 SOLUTION TOPICAL at 12:48

## 2019-05-02 RX ADMIN — Medication 650 MILLIGRAM(S): at 17:40

## 2019-05-02 RX ADMIN — Medication 5 MILLIGRAM(S): at 05:31

## 2019-05-02 RX ADMIN — Medication 1000 MILLIGRAM(S): at 07:21

## 2019-05-02 RX ADMIN — Medication 2: at 08:41

## 2019-05-02 RX ADMIN — Medication 400 MILLIGRAM(S): at 06:51

## 2019-05-02 RX ADMIN — Medication 4: at 17:10

## 2019-05-02 RX ADMIN — GABAPENTIN 300 MILLIGRAM(S): 400 CAPSULE ORAL at 12:47

## 2019-05-02 RX ADMIN — GANCICLOVIR SODIUM 100 MILLIGRAM(S): 50 INJECTION, POWDER, LYOPHILIZED, FOR SOLUTION INTRAVENTRICULAR at 17:08

## 2019-05-02 RX ADMIN — Medication 2 TABLET(S): at 16:25

## 2019-05-02 RX ADMIN — Medication 50 MILLIGRAM(S): at 05:31

## 2019-05-02 RX ADMIN — CEFEPIME 100 MILLIGRAM(S): 1 INJECTION, POWDER, FOR SOLUTION INTRAMUSCULAR; INTRAVENOUS at 20:54

## 2019-05-02 RX ADMIN — Medication 2 TABLET(S): at 21:03

## 2019-05-02 NOTE — PROGRESS NOTE ADULT - ASSESSMENT
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

## 2019-05-02 NOTE — PROGRESS NOTE ADULT - PROBLEM SELECTOR PLAN 4
- Will hold home tacro/MMF for now  - Tac level 39 on admission; 23 today, cont to hold tac  -f/u tac level tomorrow  - Continue home Prednisone 5qd

## 2019-05-02 NOTE — PROGRESS NOTE ADULT - SUBJECTIVE AND OBJECTIVE BOX
Transplant Medicine follow up note  --------------------------------------------------------------  DDRT  2018  Patient is admitted with CHELA, noted to have elevated Tac level  Overnight events reviewed.  Has diarrhea, not new      MEDICATIONS  (STANDING):  cefepime   IVPB 1000 milliGRAM(s) IV Intermittent every 24 hours  dextrose 50% Injectable 25 Gram(s) IV Push once  dextrose 50% Injectable 25 Gram(s) IV Push once  diphenoxylate/atropine 2 Tablet(s) Oral three times a day  gabapentin 300 milliGRAM(s) Oral daily  ganciclovir IVPB 60 milliGRAM(s) IV Intermittent daily  heparin  Injectable 5000 Unit(s) SubCutaneous every 12 hours  insulin lispro (HumaLOG) corrective regimen sliding scale   SubCutaneous three times a day before meals  insulin lispro (HumaLOG) corrective regimen sliding scale   SubCutaneous at bedtime  metoprolol tartrate 50 milliGRAM(s) Oral daily  NIFEdipine XL 30 milliGRAM(s) Oral daily  predniSONE   Tablet 5 milliGRAM(s) Oral daily    MEDICATIONS  (PRN):  chlorhexidine 4% Liquid 1 Application(s) Topical every 12 hours PRN solution  ondansetron Injectable 4 milliGRAM(s) IV Push every 6 hours PRN Nausea      PAST MEDICAL & SURGICAL HISTORY:  Medial meniscus tear: &#x27; 90&#x27;s  Right  Bowel obstruction: &#x27; 2017   surgically treated  Anal fissure: dx: &#x27; 2018   No surgery  Benign prostatic hypertrophy  Hepatitis C: In Donor Kidney: patient received treatment for Hep. C : post treatment: patient  tested Negative for Hep C  Kidney neoplasm: dx: &#x27;  : Left      s/p bilateral Nephrectomy &#x27; 2015  ESRD (end stage renal disease): On Dialysis &#x27;  to 2018  Type 2 diabetes mellitus: dx: &#x27;88  HTN (hypertension)  Kidney transplant recipient: 2018  @ [facility] :  +  Hep C Donor  S/P right knee arthroscopy: &#x27; 90&#x27;s  H/O ileostomy: &#x27; 2017   for 3 months. 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      Vital Signs Last 24 Hrs  T(C): 37.6 (02 May 2019 08:34), Max: 38.8 (02 May 2019 06:20)  T(F): 99.7 (02 May 2019 08:34), Max: 101.8 (02 May 2019 06:20)  HR: 108 (02 May 2019 06:20) (67 - 122)  BP: 155/76 (02 May 2019 06:20) (141/76 - 191/82)  RR: 18 (02 May 2019 06:20) (16 - 20)  SpO2: 96% (02 May 2019 06:20) (95% - 100%)    I&O's Summary    01 May 2019 07:01  -  02 May 2019 07:00  --------------------------------------------------------  IN: 880 mL / OUT: 275 mL / NET: 605 mL    02 May 2019 07:01  -  02 May 2019 10:35  --------------------------------------------------------  IN: 0 mL / OUT: 50 mL / NET: -50 mL    HENT: No acute signs  NECK: No JVD  Chest: No acute signs, no crackles  CV: No rub, no gallop  ABD: Soft, no tenderness  NEURO: No asterixis, no acute focal signs  EXT: No acute signs        LABS/STUDIES  --------------------------------------------------------------------------------              9.8    12.2  >-----------<  See Note    [19 15:40]              28.7     127  |  91  |  97  ----------------------------<  238      [19 15:40]  5.4   |  17  |  11.63        Ca     9.4     [19 15:40]      Mg     2.0     [19 06:37]      Phos  5.8     [19 06:37]    TPro  7.2  /  Alb  3.5  /  TBili  0.5  /  DBili  x   /  AST  24  /  ALT  33  /  AlkPhos  106  [19 @ 14:49]    PT/INR: PT 12.3 , INR 1.07       [19 15:31]  PTT: 28.0       [19 @ 06:38]      Creatinine Trend:  SCr 11.63 [ 15:40]  SCr 11.20 [ 06:37]  SCr 11.12 [ 22:49]  SCr 11.22 [ 14:49]    Tacrolimus (), Serum: 23.3 ng/mL ( 08:02)  Tacrolimus (), Serum: 39.2 ng/mL (:17)

## 2019-05-02 NOTE — PROGRESS NOTE ADULT - SUBJECTIVE AND OBJECTIVE BOX
Transplant Surgery - Multidisciplinary Rounds  --------------------------------------------------------------  DDRT / LDRT    Date:         POD#    Present:   Patient seen with multidisciplinary team including ( Transplant Surgeon: Dr. Russo, Dr. Castaneda, Dr. Yao. Transplant Nephrologist: Dr. Multani.  Pharmacist: Abhishek Harrell. NP/PAs: Delma Mosley, Brielle Pimentel in am rounds and examined with Dr. Yao.   Disciplines not in attendance will be notified of the plan.     Interval Events:    Potential Discharge date:                                                        Education:  Medications    Plan of care:  See Below    MEDICATIONS  (STANDING):  cefepime   IVPB 1000 milliGRAM(s) IV Intermittent every 24 hours  dextrose 50% Injectable 25 Gram(s) IV Push once  dextrose 50% Injectable 25 Gram(s) IV Push once  diphenoxylate/atropine 2 Tablet(s) Oral three times a day  gabapentin 300 milliGRAM(s) Oral daily  ganciclovir IVPB 60 milliGRAM(s) IV Intermittent daily  heparin  Injectable 5000 Unit(s) SubCutaneous every 12 hours  insulin lispro (HumaLOG) corrective regimen sliding scale   SubCutaneous three times a day before meals  insulin lispro (HumaLOG) corrective regimen sliding scale   SubCutaneous at bedtime  metoprolol tartrate 50 milliGRAM(s) Oral daily  NIFEdipine XL 30 milliGRAM(s) Oral daily  predniSONE   Tablet 5 milliGRAM(s) Oral daily    MEDICATIONS  (PRN):  chlorhexidine 4% Liquid 1 Application(s) Topical every 12 hours PRN solution  ondansetron Injectable 4 milliGRAM(s) IV Push every 6 hours PRN Nausea      PAST MEDICAL & SURGICAL HISTORY:  Medial meniscus tear: &#x27; 90&#x27;s  Right  Bowel obstruction: &#x27; 2017   surgically treated  Anal fissure: dx: &#x27; 2018   No surgery  Benign prostatic hypertrophy  Hepatitis C: In Donor Kidney: patient received treatment for Hep. C : post treatment: patient  tested Negative for Hep C  Kidney neoplasm: dx: &#x27; 2015 : Left      s/p bilateral Nephrectomy &#x27; 2015  ESRD (end stage renal disease): On Dialysis &#x27;  to 2018  Type 2 diabetes mellitus: dx: &#x27;88  HTN (hypertension)  Kidney transplant recipient: 2018  @ [facility] :  +  Hep C Donor  S/P right knee arthroscopy: &#x27; 90&#x27;s  H/O ileostomy: &#x27; 2017   for 3 months. 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      Vital Signs Last 24 Hrs  T(C): 37.6 (02 May 2019 08:34), Max: 38.8 (02 May 2019 06:20)  T(F): 99.7 (02 May 2019 08:34), Max: 101.8 (02 May 2019 06:20)  HR: 108 (02 May 2019 06:20) (67 - 122)  BP: 155/76 (02 May 2019 06:20) (141/76 - 191/82)  BP(mean): --  RR: 18 (02 May 2019 06:20) (16 - 20)  SpO2: 96% (02 May 2019 06:20) (95% - 100%)    I&O's Summary    01 May 2019 07:01  -  02 May 2019 07:00  --------------------------------------------------------  IN: 880 mL / OUT: 275 mL / NET: 605 mL    02 May 2019 07:01  -  02 May 2019 10:35  --------------------------------------------------------  IN: 0 mL / OUT: 50 mL / NET: -50 mL                              10.9   7.7   )-----------( See Note    ( 02 May 2019 06:38 )             32.4     05-02    124<L>  |  91<L>  |  89<H>  ----------------------------<  160<H>  5.6<H>   |  15<L>  |  11.20<H>    Ca    9.6      02 May 2019 06:37  Phos  5.8       Mg     2.0         TPro  7.2  /  Alb  3.5  /  TBili  0.5  /  DBili  x   /  AST  24  /  ALT  33  /  AlkPhos  106      Tacrolimus (), Serum: 39.2 ng/mL ( @ 19:17)          Review of systems  Gen: No weight changes, fatigue, fevers/chills, weakness  Skin: No rashes  Head/Eyes/Ears/Mouth: No headache; Normal hearing; Normal vision w/o blurriness; No sinus pain/discomfort, sore throat  Respiratory: No dyspnea, cough, wheezing, hemoptysis  CV: No chest pain, PND, orthopnea  GI: C/O mild abdominal pain at surgical site, diarrhea, constipation, nausea, vomiting, melena, hematochezia  : No increased frequency, dysuria, hematuria, nocturia  MSK: No joint pain/swelling; no back pain; no edema  Neuro: No dizziness/lightheadedness, weakness, seizures, numbness, tingling  Heme: No easy bruising or bleeding  Endo: No heat/cold intolerance  Psych: No significant nervousness, anxiety, stress, depression  All other systems were reviewed and are negative, except as noted.      PHYSICAL EXAM:  Constitutional: Well developed / well nourished  Eyes: Anicteric, PERRLA  ENMT: nc/at  Neck: central line *****************  Respiratory: CTA B/L  Cardiovascular: RRR  Gastrointestinal: Soft abdomen, mild tender to touch at surgical site, ND  Genitourinary: Urinary catheter in place*****Voiding spontaneously  Extremities: SCD's in place and working bilaterally  Vascular: Palpable dp pulses bilaterally  Neurological: A&O x3  Skin: Mild serosanguinous azeem on wound dressing*******Wound open to air no erythema and evidence of infection noted  Musculoskeletal: Moving all extremities  Psychiatric: Responsive Transplant Surgery - Multidisciplinary Rounds  --------------------------------------------------------------  DDRT  2018    Present:   Patient seen with multidisciplinary team including ( Transplant Surgeon: Dr. Russo, Dr. Castaneda, Dr. Yao. Transplant Nephrologist: Dr. Multani.  Pharmacist: Abhishek Harrell. NP/PAs: Silva William, , Brielle Pimentel in am rounds and examined with Dr. Yao.   Disciplines not in attendance will be notified of the plan.     Interval Events: Pt was started on IVF w/ bicarb yesterday, overnight, became anuric, had T of 101, tachy to 122, CXR normal, IVF was stopped since pt became anuric. Tac level 39.2 yesterday, tac held, level 23 this AM. Cellcept also on hold.  K 5.6 this AM from 6.1 yesterday.  CMV PCR from  was 13,470 (uptrending), was started on valcyte as outpt on , now on Ganciclovir. UA from yesterday + leukocytes, continued on cefepime Renal US from yesterday neg for hydro/KEREN.       Potential Discharge date: Pending clinical improvement.                                                      Education:  Medications    Plan of care:  See Below    MEDICATIONS  (STANDING):  cefepime   IVPB 1000 milliGRAM(s) IV Intermittent every 24 hours  dextrose 50% Injectable 25 Gram(s) IV Push once  dextrose 50% Injectable 25 Gram(s) IV Push once  diphenoxylate/atropine 2 Tablet(s) Oral three times a day  gabapentin 300 milliGRAM(s) Oral daily  ganciclovir IVPB 60 milliGRAM(s) IV Intermittent daily  heparin  Injectable 5000 Unit(s) SubCutaneous every 12 hours  insulin lispro (HumaLOG) corrective regimen sliding scale   SubCutaneous three times a day before meals  insulin lispro (HumaLOG) corrective regimen sliding scale   SubCutaneous at bedtime  metoprolol tartrate 50 milliGRAM(s) Oral daily  NIFEdipine XL 30 milliGRAM(s) Oral daily  predniSONE   Tablet 5 milliGRAM(s) Oral daily    MEDICATIONS  (PRN):  chlorhexidine 4% Liquid 1 Application(s) Topical every 12 hours PRN solution  ondansetron Injectable 4 milliGRAM(s) IV Push every 6 hours PRN Nausea      PAST MEDICAL & SURGICAL HISTORY:  Medial meniscus tear: &#x27; 90&#x27;s  Right  Bowel obstruction: &#x27; 2017   surgically treated  Anal fissure: dx: &#x27; 2018   No surgery  Benign prostatic hypertrophy  Hepatitis C: In Donor Kidney: patient received treatment for Hep. C : post treatment: patient  tested Negative for Hep C  Kidney neoplasm: dx: &#x27;  : Left      s/p bilateral Nephrectomy &#x27; 2015  ESRD (end stage renal disease): On Dialysis &#x27;  to 2018  Type 2 diabetes mellitus: dx: &#x27;88  HTN (hypertension)  Kidney transplant recipient: 2018  @ [facility] :  +  Hep C Donor  S/P right knee arthroscopy: &#x27; 90&#x27;s  H/O ileostomy: &#x27; 2017   for 3 months. 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      Vital Signs Last 24 Hrs  T(C): 37.6 (02 May 2019 08:34), Max: 38.8 (02 May 2019 06:20)  T(F): 99.7 (02 May 2019 08:34), Max: 101.8 (02 May 2019 06:20)  HR: 108 (02 May 2019 06:20) (67 - 122)  BP: 155/76 (02 May 2019 06:20) (141/76 - 191/82)  BP(mean): --  RR: 18 (02 May 2019 06:20) (16 - 20)  SpO2: 96% (02 May 2019 06:20) (95% - 100%)    I&O's Summary    01 May 2019 07:01  -  02 May 2019 07:00  --------------------------------------------------------  IN: 880 mL / OUT: 275 mL / NET: 605 mL    02 May 2019 07:01  -  02 May 2019 10:35  --------------------------------------------------------  IN: 0 mL / OUT: 50 mL / NET: -50 mL                       10.9   7.7   )-----------( See Note    ( 02 May 2019 06:38 )             32.4     05-    124<L>  |  91<L>  |  89<H>  ----------------------------<  160<H>  5.6<H>   |  15<L>  |  11.20<H>    Ca    9.6      02 May 2019 06:37  Phos  5.8     05-  Mg     2.0         TPro  7.2  /  Alb  3.5  /  TBili  0.5  /  DBili  x   /  AST  24  /  ALT  33  /  AlkPhos  106  05    Tacrolimus (), Serum: 39.2 ng/mL ( @ 19:17)    Physical Exam  	Gen: NAD  	Neuro: A&Ox3, moving all 4 extremities against gravity, no asterixis  	CV: tachycardic, S1S2  	Resp: No increased work of breathing, CTAB to bases  	Abd: Multiple well healed incisions, soft, nontender, nondistended. 3x3cm reducible ventral hernia associated with prior midline ex lap scar.  	Ext: WWP without any appreciable edema  Vasc: palp distal pulses throughout, LUE radiocephalic AVF with palpable

## 2019-05-03 LAB
ANION GAP SERPL CALC-SCNC: 17 MMOL/L — SIGNIFICANT CHANGE UP (ref 5–17)
ANION GAP SERPL CALC-SCNC: 19 MMOL/L — HIGH (ref 5–17)
APTT BLD: 28.9 SEC — SIGNIFICANT CHANGE UP (ref 27.5–36.3)
BUN SERPL-MCNC: 101 MG/DL — HIGH (ref 7–23)
BUN SERPL-MCNC: 102 MG/DL — HIGH (ref 7–23)
CALCIUM SERPL-MCNC: 9.1 MG/DL — SIGNIFICANT CHANGE UP (ref 8.4–10.5)
CALCIUM SERPL-MCNC: 9.3 MG/DL — SIGNIFICANT CHANGE UP (ref 8.4–10.5)
CHLORIDE SERPL-SCNC: 94 MMOL/L — LOW (ref 96–108)
CHLORIDE SERPL-SCNC: 94 MMOL/L — LOW (ref 96–108)
CO2 SERPL-SCNC: 15 MMOL/L — LOW (ref 22–31)
CO2 SERPL-SCNC: 15 MMOL/L — LOW (ref 22–31)
CREAT SERPL-MCNC: 11.05 MG/DL — HIGH (ref 0.5–1.3)
CREAT SERPL-MCNC: 11.58 MG/DL — HIGH (ref 0.5–1.3)
GLUCOSE BLDC GLUCOMTR-MCNC: 133 MG/DL — HIGH (ref 70–99)
GLUCOSE BLDC GLUCOMTR-MCNC: 142 MG/DL — HIGH (ref 70–99)
GLUCOSE BLDC GLUCOMTR-MCNC: 208 MG/DL — HIGH (ref 70–99)
GLUCOSE BLDC GLUCOMTR-MCNC: 215 MG/DL — HIGH (ref 70–99)
GLUCOSE BLDC GLUCOMTR-MCNC: 247 MG/DL — HIGH (ref 70–99)
GLUCOSE SERPL-MCNC: 202 MG/DL — HIGH (ref 70–99)
GLUCOSE SERPL-MCNC: 203 MG/DL — HIGH (ref 70–99)
HCT VFR BLD CALC: 26.2 % — LOW (ref 39–50)
HGB BLD-MCNC: 8.9 G/DL — LOW (ref 13–17)
MAGNESIUM SERPL-MCNC: 2 MG/DL — SIGNIFICANT CHANGE UP (ref 1.6–2.6)
MCHC RBC-ENTMCNC: 31.5 PG — SIGNIFICANT CHANGE UP (ref 27–34)
MCHC RBC-ENTMCNC: 33.8 GM/DL — SIGNIFICANT CHANGE UP (ref 32–36)
MCV RBC AUTO: 93 FL — SIGNIFICANT CHANGE UP (ref 80–100)
MYCOPHENOLATE SERPL-MCNC: ABNORMAL
PHOSPHATE SERPL-MCNC: 6.1 MG/DL — HIGH (ref 2.5–4.5)
PLATELET # BLD AUTO: SIGNIFICANT CHANGE UP (ref 150–400)
POTASSIUM SERPL-MCNC: 6.2 MMOL/L — CRITICAL HIGH (ref 3.5–5.3)
POTASSIUM SERPL-MCNC: 6.8 MMOL/L — CRITICAL HIGH (ref 3.5–5.3)
POTASSIUM SERPL-SCNC: 6.2 MMOL/L — CRITICAL HIGH (ref 3.5–5.3)
POTASSIUM SERPL-SCNC: 6.8 MMOL/L — CRITICAL HIGH (ref 3.5–5.3)
RBC # BLD: 2.82 M/UL — LOW (ref 4.2–5.8)
RBC # FLD: 14.6 % — HIGH (ref 10.3–14.5)
SODIUM SERPL-SCNC: 126 MMOL/L — LOW (ref 135–145)
SODIUM SERPL-SCNC: 128 MMOL/L — LOW (ref 135–145)
TACROLIMUS SERPL-MCNC: 13.1 NG/ML — SIGNIFICANT CHANGE UP
WBC # BLD: 7.4 K/UL — SIGNIFICANT CHANGE UP (ref 3.8–10.5)
WBC # FLD AUTO: 7.4 K/UL — SIGNIFICANT CHANGE UP (ref 3.8–10.5)

## 2019-05-03 PROCEDURE — 90935 HEMODIALYSIS ONE EVALUATION: CPT

## 2019-05-03 PROCEDURE — 99232 SBSQ HOSP IP/OBS MODERATE 35: CPT | Mod: GC,24

## 2019-05-03 PROCEDURE — 93010 ELECTROCARDIOGRAM REPORT: CPT

## 2019-05-03 RX ORDER — DEXTROSE 50 % IN WATER 50 %
50 SYRINGE (ML) INTRAVENOUS ONCE
Qty: 0 | Refills: 0 | Status: COMPLETED | OUTPATIENT
Start: 2019-05-03 | End: 2019-05-03

## 2019-05-03 RX ORDER — INSULIN HUMAN 100 [IU]/ML
10 INJECTION, SOLUTION SUBCUTANEOUS ONCE
Qty: 0 | Refills: 0 | Status: COMPLETED | OUTPATIENT
Start: 2019-05-03 | End: 2019-05-03

## 2019-05-03 RX ORDER — ACETAMINOPHEN 500 MG
650 TABLET ORAL ONCE
Qty: 0 | Refills: 0 | Status: COMPLETED | OUTPATIENT
Start: 2019-05-03 | End: 2019-05-03

## 2019-05-03 RX ORDER — FUROSEMIDE 40 MG
80 TABLET ORAL ONCE
Qty: 0 | Refills: 0 | Status: COMPLETED | OUTPATIENT
Start: 2019-05-03 | End: 2019-05-03

## 2019-05-03 RX ORDER — ALBUTEROL 90 UG/1
10 AEROSOL, METERED ORAL ONCE
Qty: 0 | Refills: 0 | Status: COMPLETED | OUTPATIENT
Start: 2019-05-03 | End: 2019-05-03

## 2019-05-03 RX ADMIN — Medication 50 MILLILITER(S): at 08:25

## 2019-05-03 RX ADMIN — Medication 2 TABLET(S): at 06:06

## 2019-05-03 RX ADMIN — ALBUTEROL 10 MILLIGRAM(S): 90 AEROSOL, METERED ORAL at 08:32

## 2019-05-03 RX ADMIN — Medication 5 MILLIGRAM(S): at 06:06

## 2019-05-03 RX ADMIN — HEPARIN SODIUM 5000 UNIT(S): 5000 INJECTION INTRAVENOUS; SUBCUTANEOUS at 06:06

## 2019-05-03 RX ADMIN — Medication 50 MILLIGRAM(S): at 06:07

## 2019-05-03 RX ADMIN — INSULIN HUMAN 10 UNIT(S): 100 INJECTION, SOLUTION SUBCUTANEOUS at 08:26

## 2019-05-03 RX ADMIN — CEFEPIME 100 MILLIGRAM(S): 1 INJECTION, POWDER, FOR SOLUTION INTRAMUSCULAR; INTRAVENOUS at 21:22

## 2019-05-03 RX ADMIN — CHLORHEXIDINE GLUCONATE 1 APPLICATION(S): 213 SOLUTION TOPICAL at 08:33

## 2019-05-03 RX ADMIN — GANCICLOVIR SODIUM 100 MILLIGRAM(S): 50 INJECTION, POWDER, LYOPHILIZED, FOR SOLUTION INTRAVENTRICULAR at 13:39

## 2019-05-03 RX ADMIN — Medication 30 MILLIGRAM(S): at 06:06

## 2019-05-03 RX ADMIN — Medication 80 MILLIGRAM(S): at 09:17

## 2019-05-03 RX ADMIN — GABAPENTIN 300 MILLIGRAM(S): 400 CAPSULE ORAL at 13:33

## 2019-05-03 RX ADMIN — Medication 650 MILLIGRAM(S): at 16:06

## 2019-05-03 RX ADMIN — Medication 4: at 08:33

## 2019-05-03 RX ADMIN — Medication 2 TABLET(S): at 13:36

## 2019-05-03 RX ADMIN — Medication 2 TABLET(S): at 21:42

## 2019-05-03 RX ADMIN — Medication 50 MILLILITER(S): at 15:16

## 2019-05-03 RX ADMIN — INSULIN HUMAN 10 UNIT(S): 100 INJECTION, SOLUTION SUBCUTANEOUS at 15:21

## 2019-05-03 RX ADMIN — Medication 4: at 13:38

## 2019-05-03 NOTE — PROGRESS NOTE ADULT - SUBJECTIVE AND OBJECTIVE BOX
Transplant Surgery - Multidisciplinary Rounds  --------------------------------------------------------------  DDRT  2018    Present: Patient seen with multidisciplinary team including Transplant Surgeon: Dr. Castaneda, Dr. Yao, Dr. Nicole and Transplant Nephrologist: Dr. Multani.  Pharmacist: Abhishek Harrell. Phong Lopez and Brielle Pimentel in am rounds and examined with Dr. Nicole.  Disciplines not in attendance will be notified of the plan.     HPI: 68M with a PMH of ESRD 2/2 bilateral nephrectomy from neoplasm, hypertension, and NIDDM status post HCV + DDRT () c/b DGF, ATN/mild rejection (2017 treated with steroids) and perinephric collection requiring drainage. 19 ureteral stent removal; recent admission in (2017) with E coli bacteremia 2/2 urosepsis treated w/ IV vanc/cefepime/zosyn and DC home on Cipro. 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    Admitted with severe CHELA secondary to FK toxicity and UTI.     Interval Events: Afebrile overnight; decreased urine output; received lasix 80mg IVP (9am) - 140cc output by noon.     Potential Discharge date: Pending clinical improvement.                                                      Education:  Medications    Plan of care:  See Below    MEDICATIONS  (STANDING):  cefepime   IVPB 1000 milliGRAM(s) IV Intermittent every 24 hours  dextrose 50% Injectable 25 Gram(s) IV Push once  dextrose 50% Injectable 25 Gram(s) IV Push once  diphenoxylate/atropine 2 Tablet(s) Oral three times a day  gabapentin 300 milliGRAM(s) Oral daily  ganciclovir IVPB 60 milliGRAM(s) IV Intermittent daily  heparin  Injectable 5000 Unit(s) SubCutaneous every 12 hours  insulin lispro (HumaLOG) corrective regimen sliding scale   SubCutaneous three times a day before meals  insulin lispro (HumaLOG) corrective regimen sliding scale   SubCutaneous at bedtime  metoprolol tartrate 50 milliGRAM(s) Oral daily  NIFEdipine XL 30 milliGRAM(s) Oral daily  predniSONE   Tablet 5 milliGRAM(s) Oral daily    MEDICATIONS  (PRN):  chlorhexidine 4% Liquid 1 Application(s) Topical every 12 hours PRN solution  ondansetron Injectable 4 milliGRAM(s) IV Push every 6 hours PRN Nausea      PAST MEDICAL & SURGICAL HISTORY:  Medial meniscus tear: &#x27; 90&#x27;s  Right  Bowel obstruction: &#x27; 2017   surgically treated  Anal fissure: dx: &#x27; 2018   No surgery  Benign prostatic hypertrophy  Hepatitis C: In Donor Kidney: patient received treatment for Hep. C : post treatment: patient  tested Negative for Hep C  Kidney neoplasm: dx: &#x27; 2015 : Left      s/p bilateral Nephrectomy &#x27; 2015  ESRD (end stage renal disease): On Dialysis &#x27;  to 2018  Type 2 diabetes mellitus: dx: &#x27;88  HTN (hypertension)  Kidney transplant recipient: 2018  @ [facility] :  +  Hep C Donor  S/P right knee arthroscopy: &#x27; 90&#x27;s  H/O ileostomy: &#x27; 2017   for 3 months. 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      Vital Signs Last 24 Hrs  T(C): 36.8 (03 May 2019 10:09), Max: 37.2 (03 May 2019 02:04)  T(F): 98.2 (03 May 2019 10:09), Max: 99 (03 May 2019 02:04)  HR: 80 (03 May 2019 10:09) (80 - 96)  BP: 102/58 (03 May 2019 10:09) (102/58 - 137/64)  BP(mean): --  RR: 20 (03 May 2019 10:09) (18 - 20)  SpO2: 95% (03 May 2019 10:09) (95% - 97%)    I&O's Summary    02 May 2019 07:01  -  03 May 2019 07:00  --------------------------------------------------------  IN: 480 mL / OUT: 790 mL / NET: -310 mL    03 May 2019 07:01  -  03 May 2019 12:14  --------------------------------------------------------  IN: 240 mL / OUT: 140 mL / NET: 100 mL                          8.9    7.4   )-----------( See Note    ( 03 May 2019 06:17 )             26.2     05-    128<L>  |  94<L>  |  101<H>  ----------------------------<  203<H>  6.2<HH>   |  15<L>  |  11.58<H>    Ca    9.1      03 May 2019 06:15  Phos  6.1       Mg     2.0         TPro  7.2  /  Alb  3.5  /  TBili  0.5  /  DBili  x   /  AST  24  /  ALT  33  /  AlkPhos  106  05-    Tacrolimus (), Serum: 13.1 ng/mL ( @ 07:29)    Culture - Blood (collected 19 @ 11:30)  Source: .Blood  Preliminary Report (19 @ 12:02):    No growth to date.    Culture - Blood (collected 19 @ 11:30)  Source: .Blood  Preliminary Report (19 @ 12:02):    No growth to date.      Review of systems  Gen: No weight changes  Skin: No rashes  Head/Eyes/Ears/Mouth: No headache; Normal hearing; Normal vision w/o blurriness; No sinus pain/discomfort, sore throat  Respiratory: No dyspnea, cough, wheezing, hemoptysis  CV: No chest pain, PND, orthopnea  GI: C/O mild abdominal pain at surgical site, diarrhea, constipation, nausea, vomiting, melena, hematochezia  : decreased urine output   MSK: No joint pain/swelling; no back pain; no edema  Neuro: No dizziness/lightheadedness, weakness, seizures, numbness, tingling  Heme: No easy bruising or bleeding  Endo: No heat/cold intolerance  Psych: No significant nervousness, anxiety, stress, depression  All other systems were reviewed and are negative, except as noted.        PHYSICAL EXAM:  Constitutional: Well developed / well nourished  Eyes: Anicteric, PERRLA  ENMT: nc/at  Neck: supple, no JVD  Respiratory: CTA B/L  Cardiovascular: RRR  Gastrointestinal: Multiple well healed incisions, soft, nontender, nondistended. 3x3cm reducible ventral hernia associated with prior midline ex lap scar.  Genitourinary: Urinary catheter in place  Extremities: SCD's in place and working bilaterally, LUE radiocephalic AVF with palpable thrill   Vascular: Palpable dp pulses bilaterally  Neurological: A&O x3  Skin: well healed incision   Musculoskeletal: Moving all extremities  Psychiatric: Responsive

## 2019-05-03 NOTE — PROVIDER CONTACT NOTE (CRITICAL VALUE NOTIFICATION) - SITUATION
pt admitted for elevated creatinine  DDRT 7/2018  potassium 6.2   5/2/19- dr ochoa called suggest pt may need dialysis.

## 2019-05-03 NOTE — PROGRESS NOTE ADULT - SUBJECTIVE AND OBJECTIVE BOX
Transplant Medicine follow up note  --------------------------------------------------------------  DDRT  2018  Patient is admitted with CHELA, noted to have elevated Tac level  Overnight events reviewed.  Has diarrhea, not new    MEDICATIONS  (STANDING):  cefepime   IVPB 1000 milliGRAM(s) IV Intermittent every 24 hours  dextrose 50% Injectable 25 Gram(s) IV Push once  dextrose 50% Injectable 25 Gram(s) IV Push once  diphenoxylate/atropine 2 Tablet(s) Oral three times a day  gabapentin 300 milliGRAM(s) Oral daily  ganciclovir IVPB 60 milliGRAM(s) IV Intermittent daily  heparin  Injectable 5000 Unit(s) SubCutaneous every 12 hours  insulin lispro (HumaLOG) corrective regimen sliding scale   SubCutaneous three times a day before meals  insulin lispro (HumaLOG) corrective regimen sliding scale   SubCutaneous at bedtime  metoprolol tartrate 50 milliGRAM(s) Oral daily  NIFEdipine XL 30 milliGRAM(s) Oral daily  predniSONE   Tablet 5 milliGRAM(s) Oral daily    MEDICATIONS  (PRN):  chlorhexidine 4% Liquid 1 Application(s) Topical every 12 hours PRN solution  ondansetron Injectable 4 milliGRAM(s) IV Push every 6 hours PRN Nausea    PAST MEDICAL & SURGICAL HISTORY:  Medial meniscus tear: &#x27; 90&#x27;s  Right  Bowel obstruction: &#x27; 2017   surgically treated  Anal fissure: dx: &#x27; 2018   No surgery  Benign prostatic hypertrophy  Hepatitis C: In Donor Kidney: patient received treatment for Hep. C : post treatment: patient  tested Negative for Hep C  Kidney neoplasm: dx: &#x27;  : Left      s/p bilateral Nephrectomy &#x27; 2015  ESRD (end stage renal disease): On Dialysis &#x27;  to 2018  Type 2 diabetes mellitus: dx: &#x27;88  HTN (hypertension)  Kidney transplant recipient: 2018  @ [facility] :  +  Hep C Donor  S/P right knee arthroscopy: &#x27; 90&#x27;s  H/O ileostomy: &#x27; 2017   for 3 months. 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    Vital Signs Last 24 Hrs  T(C): 36.8 (19 @ 13:43)  T(F): 98.2 (19 @ 13:43), Max: 99 (19 @ 02:04)  HR: 76 (19 @ 13:43) (76 - 96)  BP: 103/51 (19 @ 13:43)  BP(mean): --  RR: 20 (19 @ 13:43) (18 - 20)  SpO2: 95% (19 @ 13:43) (95% - 97%)  Wt(kg): --     @ 07:01  -   @ 07:00  --------------------------------------------------------  IN: 480 mL / OUT: 790 mL / NET: -310 mL     @ 07:01  -   @ 17:50  --------------------------------------------------------  IN: 640 mL / OUT: 290 mL / NET: 350 mL        HENT: No acute signs  NECK: No JVD  Chest: No acute signs, no crackles  CV: No rub, no gallop  ABD: Soft, no tenderness  NEURO: No asterixis, no acute focal signs  EXT: No acute signs            LABS/STUDIES  --------------------------------------------------------------------------------              8.9    7.4   >-----------<  See Note    [19 @ 06:17]              26.2     126  |  94  |  102  ----------------------------<  202      [19 @ 13:20]  6.8   |  15  |  11.05        Ca     9.3     [19 @ 13:20]      Mg     2.0     [19 @ 06:15]      Phos  6.1     [19 06:15]        PTT: 28.9       [19 @ 06:17]      Creatinine Trend:  SCr 11.05 [ 13:20]  SCr 11.58 [ 06:15]  SCr 11.63 [ 15:40]  SCr 11.20 [ @ 06:37]  SCr 11.12 [ @ 22:49]    Tacrolimus (), Serum: 13.1 ng/mL ( 07:29)  Tacrolimus (), Serum: 23.3 ng/mL ( 08:02)  Tacrolimus (), Serum: 39.2 ng/mL ( 19:17)

## 2019-05-03 NOTE — PROGRESS NOTE ADULT - ASSESSMENT
68M with a PMH of ESRD 2/2 bilateral nephrectomy from neoplasm, hypertension, and NIDDM status post HCV + DDRT (7/17), course complicated by DGF, mild rejection/ATN, and KEREN requiring stenting (removed 2/14/19); recently admitted (2/19) with E coli urosepsis and discharged on Cipro.     Admitted with severe CHELA, FK toxicity (level 39), UTI and CMV viremia.     Problem:   ·	s/p DDRT, admitted with CHELA likely secondary to FK toxicity   - monitor renal function  - Lasix 80mg IV x 1   - Strict I&O, keep armenta  - Hyperkalemia: tx with albuterol/I/D50 - f/u Repeat BMP/K  - No indiction for RRT at this time, will evaluate daily  - Immuno: FK on hold, Pred 5mg    ·	UTI  - cont cefepime   - urine culture    ·	HTN  - cont Metoprolol 50mg daily and nifedipine xl 30mg daily    ·	CMV Viremia  - cont gancylcovir

## 2019-05-04 LAB
ANION GAP SERPL CALC-SCNC: 14 MMOL/L — SIGNIFICANT CHANGE UP (ref 5–17)
ANION GAP SERPL CALC-SCNC: 15 MMOL/L — SIGNIFICANT CHANGE UP (ref 5–17)
ANION GAP SERPL CALC-SCNC: 16 MMOL/L — SIGNIFICANT CHANGE UP (ref 5–17)
ANION GAP SERPL CALC-SCNC: 17 MMOL/L — SIGNIFICANT CHANGE UP (ref 5–17)
APTT BLD: 28.5 SEC — SIGNIFICANT CHANGE UP (ref 27.5–36.3)
BUN SERPL-MCNC: 62 MG/DL — HIGH (ref 7–23)
BUN SERPL-MCNC: 64 MG/DL — HIGH (ref 7–23)
BUN SERPL-MCNC: 67 MG/DL — HIGH (ref 7–23)
BUN SERPL-MCNC: 67 MG/DL — HIGH (ref 7–23)
CALCIUM SERPL-MCNC: 8.7 MG/DL — SIGNIFICANT CHANGE UP (ref 8.4–10.5)
CALCIUM SERPL-MCNC: 8.8 MG/DL — SIGNIFICANT CHANGE UP (ref 8.4–10.5)
CALCIUM SERPL-MCNC: 8.9 MG/DL — SIGNIFICANT CHANGE UP (ref 8.4–10.5)
CALCIUM SERPL-MCNC: 9.4 MG/DL — SIGNIFICANT CHANGE UP (ref 8.4–10.5)
CHLORIDE SERPL-SCNC: 93 MMOL/L — LOW (ref 96–108)
CHLORIDE SERPL-SCNC: 93 MMOL/L — LOW (ref 96–108)
CHLORIDE SERPL-SCNC: 94 MMOL/L — LOW (ref 96–108)
CHLORIDE SERPL-SCNC: 95 MMOL/L — LOW (ref 96–108)
CO2 SERPL-SCNC: 21 MMOL/L — LOW (ref 22–31)
CO2 SERPL-SCNC: 21 MMOL/L — LOW (ref 22–31)
CO2 SERPL-SCNC: 22 MMOL/L — SIGNIFICANT CHANGE UP (ref 22–31)
CO2 SERPL-SCNC: 24 MMOL/L — SIGNIFICANT CHANGE UP (ref 22–31)
CREAT SERPL-MCNC: 6.66 MG/DL — HIGH (ref 0.5–1.3)
CREAT SERPL-MCNC: 6.87 MG/DL — HIGH (ref 0.5–1.3)
CREAT SERPL-MCNC: 6.96 MG/DL — HIGH (ref 0.5–1.3)
CREAT SERPL-MCNC: 7.03 MG/DL — HIGH (ref 0.5–1.3)
CULTURE RESULTS: SIGNIFICANT CHANGE UP
GLUCOSE BLDC GLUCOMTR-MCNC: 158 MG/DL — HIGH (ref 70–99)
GLUCOSE BLDC GLUCOMTR-MCNC: 226 MG/DL — HIGH (ref 70–99)
GLUCOSE BLDC GLUCOMTR-MCNC: 238 MG/DL — HIGH (ref 70–99)
GLUCOSE BLDC GLUCOMTR-MCNC: 248 MG/DL — HIGH (ref 70–99)
GLUCOSE SERPL-MCNC: 143 MG/DL — HIGH (ref 70–99)
GLUCOSE SERPL-MCNC: 164 MG/DL — HIGH (ref 70–99)
GLUCOSE SERPL-MCNC: 230 MG/DL — HIGH (ref 70–99)
GLUCOSE SERPL-MCNC: 232 MG/DL — HIGH (ref 70–99)
HBV CORE AB SER-ACNC: SIGNIFICANT CHANGE UP
HBV CORE IGM SER-ACNC: SIGNIFICANT CHANGE UP
HBV SURFACE AB SER-ACNC: <3 MIU/ML — LOW
HBV SURFACE AG SER-ACNC: SIGNIFICANT CHANGE UP
HCT VFR BLD CALC: 25.5 % — LOW (ref 39–50)
HCV AB S/CO SERPL IA: 0.07 S/CO — SIGNIFICANT CHANGE UP (ref 0–0.99)
HCV AB SERPL-IMP: SIGNIFICANT CHANGE UP
HGB BLD-MCNC: 8.9 G/DL — LOW (ref 13–17)
MAGNESIUM SERPL-MCNC: 1.9 MG/DL — SIGNIFICANT CHANGE UP (ref 1.6–2.6)
MCHC RBC-ENTMCNC: 32.4 PG — SIGNIFICANT CHANGE UP (ref 27–34)
MCHC RBC-ENTMCNC: 34.9 GM/DL — SIGNIFICANT CHANGE UP (ref 32–36)
MCV RBC AUTO: 92.8 FL — SIGNIFICANT CHANGE UP (ref 80–100)
PHOSPHATE SERPL-MCNC: 4.6 MG/DL — HIGH (ref 2.5–4.5)
PLATELET # BLD AUTO: 157 K/UL — SIGNIFICANT CHANGE UP (ref 150–400)
POTASSIUM SERPL-MCNC: 4.6 MMOL/L — SIGNIFICANT CHANGE UP (ref 3.5–5.3)
POTASSIUM SERPL-MCNC: 5.1 MMOL/L — SIGNIFICANT CHANGE UP (ref 3.5–5.3)
POTASSIUM SERPL-MCNC: 5.2 MMOL/L — SIGNIFICANT CHANGE UP (ref 3.5–5.3)
POTASSIUM SERPL-MCNC: 6.3 MMOL/L — CRITICAL HIGH (ref 3.5–5.3)
POTASSIUM SERPL-SCNC: 4.6 MMOL/L — SIGNIFICANT CHANGE UP (ref 3.5–5.3)
POTASSIUM SERPL-SCNC: 5.1 MMOL/L — SIGNIFICANT CHANGE UP (ref 3.5–5.3)
POTASSIUM SERPL-SCNC: 5.2 MMOL/L — SIGNIFICANT CHANGE UP (ref 3.5–5.3)
POTASSIUM SERPL-SCNC: 6.3 MMOL/L — CRITICAL HIGH (ref 3.5–5.3)
RBC # BLD: 2.74 M/UL — LOW (ref 4.2–5.8)
RBC # FLD: 14.5 % — SIGNIFICANT CHANGE UP (ref 10.3–14.5)
SODIUM SERPL-SCNC: 129 MMOL/L — LOW (ref 135–145)
SODIUM SERPL-SCNC: 132 MMOL/L — LOW (ref 135–145)
SPECIMEN SOURCE: SIGNIFICANT CHANGE UP
TACROLIMUS SERPL-MCNC: 5.9 NG/ML — SIGNIFICANT CHANGE UP
WBC # BLD: 4.6 K/UL — SIGNIFICANT CHANGE UP (ref 3.8–10.5)
WBC # FLD AUTO: 4.6 K/UL — SIGNIFICANT CHANGE UP (ref 3.8–10.5)

## 2019-05-04 PROCEDURE — 99233 SBSQ HOSP IP/OBS HIGH 50: CPT

## 2019-05-04 PROCEDURE — 99232 SBSQ HOSP IP/OBS MODERATE 35: CPT | Mod: GC

## 2019-05-04 RX ORDER — TACROLIMUS 5 MG/1
2 CAPSULE ORAL
Qty: 0 | Refills: 0 | Status: DISCONTINUED | OUTPATIENT
Start: 2019-05-04 | End: 2019-05-09

## 2019-05-04 RX ORDER — TAMSULOSIN HYDROCHLORIDE 0.4 MG/1
0.4 CAPSULE ORAL ONCE
Qty: 0 | Refills: 0 | Status: COMPLETED | OUTPATIENT
Start: 2019-05-04 | End: 2019-05-04

## 2019-05-04 RX ORDER — FUROSEMIDE 40 MG
60 TABLET ORAL ONCE
Qty: 0 | Refills: 0 | Status: COMPLETED | OUTPATIENT
Start: 2019-05-04 | End: 2019-05-04

## 2019-05-04 RX ORDER — TAMSULOSIN HYDROCHLORIDE 0.4 MG/1
0.4 CAPSULE ORAL AT BEDTIME
Qty: 0 | Refills: 0 | Status: DISCONTINUED | OUTPATIENT
Start: 2019-05-05 | End: 2019-05-09

## 2019-05-04 RX ADMIN — Medication 30 MILLIGRAM(S): at 06:01

## 2019-05-04 RX ADMIN — Medication 2: at 08:11

## 2019-05-04 RX ADMIN — Medication 4: at 12:21

## 2019-05-04 RX ADMIN — GABAPENTIN 300 MILLIGRAM(S): 400 CAPSULE ORAL at 12:21

## 2019-05-04 RX ADMIN — Medication 5 MILLIGRAM(S): at 06:01

## 2019-05-04 RX ADMIN — Medication 50 MILLIGRAM(S): at 06:02

## 2019-05-04 RX ADMIN — Medication 2 TABLET(S): at 06:01

## 2019-05-04 RX ADMIN — Medication 4: at 17:06

## 2019-05-04 RX ADMIN — HEPARIN SODIUM 5000 UNIT(S): 5000 INJECTION INTRAVENOUS; SUBCUTANEOUS at 17:07

## 2019-05-04 RX ADMIN — Medication 60 MILLIGRAM(S): at 10:43

## 2019-05-04 RX ADMIN — ONDANSETRON 4 MILLIGRAM(S): 8 TABLET, FILM COATED ORAL at 13:29

## 2019-05-04 RX ADMIN — HEPARIN SODIUM 5000 UNIT(S): 5000 INJECTION INTRAVENOUS; SUBCUTANEOUS at 06:02

## 2019-05-04 RX ADMIN — Medication 2 TABLET(S): at 21:37

## 2019-05-04 RX ADMIN — TAMSULOSIN HYDROCHLORIDE 0.4 MILLIGRAM(S): 0.4 CAPSULE ORAL at 18:23

## 2019-05-04 RX ADMIN — TACROLIMUS 2 MILLIGRAM(S): 5 CAPSULE ORAL at 18:23

## 2019-05-04 RX ADMIN — Medication 2 TABLET(S): at 13:58

## 2019-05-04 RX ADMIN — CEFEPIME 100 MILLIGRAM(S): 1 INJECTION, POWDER, FOR SOLUTION INTRAMUSCULAR; INTRAVENOUS at 20:48

## 2019-05-04 RX ADMIN — GANCICLOVIR SODIUM 100 MILLIGRAM(S): 50 INJECTION, POWDER, LYOPHILIZED, FOR SOLUTION INTRAVENTRICULAR at 12:23

## 2019-05-04 NOTE — PROGRESS NOTE ADULT - SUBJECTIVE AND OBJECTIVE BOX
Transplant Surgery - Multidisciplinary Rounds  --------------------------------------------------------------  DDRT  2018    Present: Patient seen with multidisciplinary team including Transplant Surgeon: Dr. Nicole, NP Roxann Jorgensen in am rounds and examined with Dr. Nicole.  Disciplines not in attendance will be notified of the plan.     HPI: 68M with a PMH of ESRD 2/2 bilateral nephrectomy from neoplasm, hypertension, and NIDDM status post HCV + DDRT () c/b DGF, ATN/mild rejection (2017 treated with steroids) and perinephric collection requiring drainage. 19 ureteral stent removal; recent admission in (2017) with E coli bacteremia 2/2 urosepsis treated w/ IV vanc/cefepime/zosyn and DC home on Cipro. 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    Admitted with severe CHELA secondary to FK toxicity and UTI.     Interval Events: CHELA, Hyperkalemia 6.2-6.8 yesterday with poor response to Insulin/dextrose/lasix. Received HD session last evening. Left AVF swollen mid-treatment requiring recannulation and completion of session. Removed 200ml. Left AVF swollen but patent this morning. Patient denies pain. SCr 7.03, K 5.2 this morning. UOP improving overnight. Total 880ml. Remains on Cefepime for +Ua. Urine Cx sent and pending. Tolerating regular diet. +BM.    Potential Discharge date: Pending clinical improvement.                                                      Education:  Medications    Plan of care:  See Below      MEDICATIONS  (STANDING):  cefepime   IVPB 1000 milliGRAM(s) IV Intermittent every 24 hours  dextrose 50% Injectable 25 Gram(s) IV Push once  dextrose 50% Injectable 25 Gram(s) IV Push once  diphenoxylate/atropine 2 Tablet(s) Oral three times a day  gabapentin 300 milliGRAM(s) Oral daily  ganciclovir IVPB 60 milliGRAM(s) IV Intermittent daily  heparin  Injectable 5000 Unit(s) SubCutaneous every 12 hours  insulin lispro (HumaLOG) corrective regimen sliding scale   SubCutaneous three times a day before meals  insulin lispro (HumaLOG) corrective regimen sliding scale   SubCutaneous at bedtime  metoprolol tartrate 50 milliGRAM(s) Oral daily  NIFEdipine XL 30 milliGRAM(s) Oral daily  predniSONE   Tablet 5 milliGRAM(s) Oral daily    MEDICATIONS  (PRN):  chlorhexidine 4% Liquid 1 Application(s) Topical every 12 hours PRN solution  ondansetron Injectable 4 milliGRAM(s) IV Push every 6 hours PRN Nausea      PAST MEDICAL & SURGICAL HISTORY:  Medial meniscus tear: &#x27; 90&#x27;s  Right  Bowel obstruction: &#x27; 2017   surgically treated  Anal fissure: dx: &#x27; 2018   No surgery  Benign prostatic hypertrophy  Hepatitis C: In Donor Kidney: patient received treatment for Hep. C : post treatment: patient  tested Negative for Hep C  Kidney neoplasm: dx: &#x27; 2015 : Left      s/p bilateral Nephrectomy &#x27; 2015  ESRD (end stage renal disease): On Dialysis &#x27;  to 2018  Type 2 diabetes mellitus: dx: &#x27;88  HTN (hypertension)  Kidney transplant recipient: 2018  @ [facility] :  +  Hep C Donor  S/P right knee arthroscopy: &#x27; 90&#x27;s  H/O ileostomy: &#x27; 2017   for 3 months. 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      Vital Signs Last 24 Hrs  T(C): 37.4 (04 May 2019 08:45), Max: 38.1 (04 May 2019 01:32)  T(F): 99.3 (04 May 2019 08:45), Max: 100.6 (04 May 2019 01:32)  HR: 82 (04 May 2019 08:45) (76 - 104)  BP: 114/55 (04 May 2019 08:45) (103/51 - 139/66)  BP(mean): --  RR: 16 (04 May 2019 08:45) (16 - 20)  SpO2: 93% (04 May 2019 08:45) (93% - 100%)    I&O's Summary    03 May 2019 07:01  -  04 May 2019 07:00  --------------------------------------------------------  IN: 840 mL / OUT: 1090 mL / NET: -250 mL    04 May 2019 07:01  -  04 May 2019 11:25  --------------------------------------------------------  IN: 840 mL / OUT: 270 mL / NET: 570 mL                            8.9    4.6   )-----------( 157      ( 04 May 2019 06:02 )             25.5     05-04    132<L>  |  95<L>  |  64<H>  ----------------------------<  164<H>  5.2   |  21<L>  |  7.03<H>    Ca    8.8      04 May 2019 06:02  Phos  4.6     05-04  Mg     1.9     05-04      Tacrolimus (), Serum: 5.9 ng/mL ( @ 08:43)        Culture - Blood (collected 19 @ 11:30)  Source: .Blood  Preliminary Report (19 @ 12:02):    No growth to date.    Culture - Blood (collected 19 @ 11:30)  Source: .Blood  Preliminary Report (19 @ 12:02):    No growth to date.      Review of systems  Gen: No weight changes  Skin: No rashes  Head/Eyes/Ears/Mouth: No headache; Normal hearing; Normal vision w/o blurriness; No sinus pain/discomfort, sore throat  Respiratory: No dyspnea, cough, wheezing, hemoptysis  CV: No chest pain, PND, orthopnea  GI: denies abdominal pain, diarrhea, constipation, nausea, vomiting, melena, hematochezia  : decreased urine output   MSK: No joint pain/swelling; no back pain; no edema  Neuro: No dizziness/lightheadedness, weakness, seizures, numbness, tingling  Heme: No easy bruising or bleeding  Endo: No heat/cold intolerance  Psych: No significant nervousness, anxiety, stress, depression  All other systems were reviewed and are negative, except as noted.        PHYSICAL EXAM:  Constitutional: Well developed / well nourished  Eyes: Anicteric, PERRLA  ENMT: nc/at  Neck: supple, no JVD  Respiratory: CTA B/L  Cardiovascular: RRR  Gastrointestinal: Multiple well healed incisions, soft, nontender, nondistended. 3x3cm reducible ventral hernia associated with prior midline ex lap scar.  Genitourinary: Urinary catheter in place with clear yellow urine  Extremities: SCD's in place and working bilaterally, LUE radiocephalic AVF with palpable thrill   Vascular: Palpable dp pulses bilaterally  Neurological: A&O x3  Skin: well healed incision   Musculoskeletal: Moving all extremities  Psychiatric: Responsive

## 2019-05-04 NOTE — PROGRESS NOTE ADULT - SUBJECTIVE AND OBJECTIVE BOX
[facility] DIVISION OF KIDNEY DISEASES AND HYPERTENSION -- PROGRESS NOTE    Chief complaint: s/p kidney transplant    24 hour events/subjective: received urgent HD for hyperkalemia yesterday        PAST HISTORY  --------------------------------------------------------------------------------  No significant changes to PMH, PSH, FHx, SHx, unless otherwise noted    ALLERGIES & MEDICATIONS  --------------------------------------------------------------------------------  Allergies    No Known Allergies    Intolerances      Standing Inpatient Medications  cefepime   IVPB 1000 milliGRAM(s) IV Intermittent every 24 hours  dextrose 50% Injectable 25 Gram(s) IV Push once  dextrose 50% Injectable 25 Gram(s) IV Push once  diphenoxylate/atropine 2 Tablet(s) Oral three times a day  gabapentin 300 milliGRAM(s) Oral daily  ganciclovir IVPB 60 milliGRAM(s) IV Intermittent daily  heparin  Injectable 5000 Unit(s) SubCutaneous every 12 hours  insulin lispro (HumaLOG) corrective regimen sliding scale   SubCutaneous three times a day before meals  insulin lispro (HumaLOG) corrective regimen sliding scale   SubCutaneous at bedtime  metoprolol tartrate 50 milliGRAM(s) Oral daily  NIFEdipine XL 30 milliGRAM(s) Oral daily  predniSONE   Tablet 5 milliGRAM(s) Oral daily    PRN Inpatient Medications  chlorhexidine 4% Liquid 1 Application(s) Topical every 12 hours PRN  ondansetron Injectable 4 milliGRAM(s) IV Push every 6 hours PRN      REVIEW OF SYSTEMS  --------------------------------------------------------------------------------  Constitutional: [ ] Fever [ ] Chills [ ] Fatigue [ ] Weight change   HEENT: [ ] Blurred vision [ ] Eye Pain [ ] Headache [ ] Runny nose [ ] Sore Throat   Respiratory: [ ] Cough [ ] Wheezing [ ] Shortness of breath  Cardiovascular: [ ] Chest Pain [ ] Palpitations [ ] ALVES [ ] PND [ ] Orthopnea  Gastrointestinal: [ ] Abdominal Pain [ ] Diarrhea [ ] Constipation [ ] Hemorrhoids [ ] Nausea [ ] Vomiting  Genitourinary: [ ] Nocturia [ ] Dysuria [ ] Incontinence  Extremities: [ ] Swelling [ ] Joint Pain  Neurologic: [ ] Focal deficit [ ] Paresthesias [ ] Syncope  Lymphatic: [ ] Swelling [ ] Lymphadenopathy   Skin: [ ] Rash [ ] Ecchymoses [ ] Wounds [ ] Lesions, swelling over the LUE AVF site  Psychiatry: [ ] Depression [ ] Suicidal/Homicidal Ideation [ ] Anxiety [ ] Sleep Disturbances  [x ] 10 point review of systems is otherwise negative except as mentioned above              [ ]Unable to obtain due to   All other systems were reviewed and are negative, except as noted.    VITALS/PHYSICAL EXAM  --------------------------------------------------------------------------------  T(C): 37.4 (05-04-19 @ 08:45), Max: 38.1 (05-04-19 @ 01:32)  HR: 82 (05-04-19 @ 08:45) (76 - 104)  BP: 114/55 (05-04-19 @ 08:45) (103/51 - 139/66)  RR: 16 (05-04-19 @ 08:45) (16 - 20)  SpO2: 93% (05-04-19 @ 08:45) (93% - 100%)  Wt(kg): --        05-03-19 @ 07:01  -  05-04-19 @ 07:00  --------------------------------------------------------  IN: 840 mL / OUT: 1090 mL / NET: -250 mL    05-04-19 @ 07:01  -  05-04-19 @ 11:56  --------------------------------------------------------  IN: 840 mL / OUT: 270 mL / NET: 570 mL      Physical Exam:  	Gen: NAD, well-appearing  	HEENT: on room air  	Pulm: CTA B/L  	CV: normal S1S2; no rub  	Abd: soft                      Back : unable to examine  	: + geovany  	LE: no edema  	Skin: Warm, without rashes  	Vascular access: LUE AVF with good thrill and bruit, noted swelling over the AVF that is non tender and improving as per patient    LABS/STUDIES  --------------------------------------------------------------------------------              8.9    4.6   >-----------<  157      [05-04-19 @ 06:02]              25.5     132  |  95  |  64  ----------------------------<  164      [05-04-19 @ 06:02]  5.2   |  21  |  7.03        Ca     8.8     [05-04-19 @ 06:02]      Mg     1.9     [05-04-19 @ 06:02]      Phos  4.6     [05-04-19 @ 06:02]        PTT: 28.5       [05-04-19 @ 06:02]      Creatinine Trend:  SCr 7.03 [05-04 @ 06:02]  SCr 6.96 [05-04 @ 00:32]  SCr 11.05 [05-03 @ 13:20]  SCr 11.58 [05-03 @ 06:15]  SCr 11.63 [05-02 @ 15:40]    Urinalysis - [05-01-19 @ 15:31]      Color Yellow / Appearance Turbid / SG 1.020 / pH 6.0      Gluc Negative / Ketone Negative  / Bili Negative / Urobili Negative       Blood Moderate / Protein 300 mg/dL / Leuk Est Large / Nitrite Negative      RBC 25 / WBC >50 / Hyaline 2 / Gran  / Sq Epi  / Non Sq Epi 2 / Bacteria TNTC      HbA1c 6.4      [05-02-19 @ 08:36]    HBsAb <3.0      [05-03-19 @ 22:44]  HBsAg Nonreact      [05-03-19 @ 22:44]  HBcAb Nonreact      [05-03-19 @ 22:44]  HCV 0.07, Nonreact      [05-03-19 @ 22:44]

## 2019-05-04 NOTE — PROGRESS NOTE ADULT - PROBLEM SELECTOR PROBLEM 4
Acute renal failure (ARF)
Acute renal failure (ARF)
Tacrolimus-induced nephrotoxicity
Immunosuppression

## 2019-05-04 NOTE — PROVIDER CONTACT NOTE (CRITICAL VALUE NOTIFICATION) - ASSESSMENT
Patient alert and oriented x4. VS stable. Afebrile, denies pain or discomfort. Denies HA, N/V, CP, or dizziness.

## 2019-05-04 NOTE — PROGRESS NOTE ADULT - ASSESSMENT
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     Problem:   ·	s/p DDRT, admitted with CHELA secondary to FK toxicity   - required HD yesterday for hyperkalemia. Reassess need daily  - Check afternoon BMP/K+.  - Lasix 60mg IV x 1   - Strict I&O, DC armenta today  - Immuno: FK on hold, MMF on hold, Pred 5mg  - FU daily tac level and resume once indicated  - PPX: on Gancyclovir    ·	UTI  - cont cefepime   - FU urine culture sent 4/3    ·	HTN  - Well controlled  - cont Metoprolol 50mg daily and nifedipine xl 30mg daily    ·	CMV Viremia  - cont gancylcovir

## 2019-05-05 LAB
ANION GAP SERPL CALC-SCNC: 13 MMOL/L — SIGNIFICANT CHANGE UP (ref 5–17)
ANION GAP SERPL CALC-SCNC: 15 MMOL/L — SIGNIFICANT CHANGE UP (ref 5–17)
APTT BLD: 31.8 SEC — SIGNIFICANT CHANGE UP (ref 27.5–36.3)
BUN SERPL-MCNC: 70 MG/DL — HIGH (ref 7–23)
BUN SERPL-MCNC: 75 MG/DL — HIGH (ref 7–23)
CALCIUM SERPL-MCNC: 8.7 MG/DL — SIGNIFICANT CHANGE UP (ref 8.4–10.5)
CALCIUM SERPL-MCNC: 8.8 MG/DL — SIGNIFICANT CHANGE UP (ref 8.4–10.5)
CHLORIDE SERPL-SCNC: 96 MMOL/L — SIGNIFICANT CHANGE UP (ref 96–108)
CHLORIDE SERPL-SCNC: 97 MMOL/L — SIGNIFICANT CHANGE UP (ref 96–108)
CO2 SERPL-SCNC: 20 MMOL/L — LOW (ref 22–31)
CO2 SERPL-SCNC: 22 MMOL/L — SIGNIFICANT CHANGE UP (ref 22–31)
CREAT SERPL-MCNC: 6.63 MG/DL — HIGH (ref 0.5–1.3)
CREAT SERPL-MCNC: 6.96 MG/DL — HIGH (ref 0.5–1.3)
GLUCOSE BLDC GLUCOMTR-MCNC: 154 MG/DL — HIGH (ref 70–99)
GLUCOSE BLDC GLUCOMTR-MCNC: 200 MG/DL — HIGH (ref 70–99)
GLUCOSE BLDC GLUCOMTR-MCNC: 201 MG/DL — HIGH (ref 70–99)
GLUCOSE BLDC GLUCOMTR-MCNC: 252 MG/DL — HIGH (ref 70–99)
GLUCOSE SERPL-MCNC: 153 MG/DL — HIGH (ref 70–99)
GLUCOSE SERPL-MCNC: 219 MG/DL — HIGH (ref 70–99)
HCT VFR BLD CALC: 26.4 % — LOW (ref 39–50)
HGB BLD-MCNC: 9 G/DL — LOW (ref 13–17)
MAGNESIUM SERPL-MCNC: 2 MG/DL — SIGNIFICANT CHANGE UP (ref 1.6–2.6)
MCHC RBC-ENTMCNC: 32.2 PG — SIGNIFICANT CHANGE UP (ref 27–34)
MCHC RBC-ENTMCNC: 33.9 GM/DL — SIGNIFICANT CHANGE UP (ref 32–36)
MCV RBC AUTO: 94.8 FL — SIGNIFICANT CHANGE UP (ref 80–100)
PHOSPHATE SERPL-MCNC: 5.5 MG/DL — HIGH (ref 2.5–4.5)
PLATELET # BLD AUTO: 142 K/UL — LOW (ref 150–400)
POTASSIUM SERPL-MCNC: 5 MMOL/L — SIGNIFICANT CHANGE UP (ref 3.5–5.3)
POTASSIUM SERPL-MCNC: 5.4 MMOL/L — HIGH (ref 3.5–5.3)
POTASSIUM SERPL-SCNC: 5 MMOL/L — SIGNIFICANT CHANGE UP (ref 3.5–5.3)
POTASSIUM SERPL-SCNC: 5.4 MMOL/L — HIGH (ref 3.5–5.3)
RBC # BLD: 2.78 M/UL — LOW (ref 4.2–5.8)
RBC # FLD: 14.6 % — HIGH (ref 10.3–14.5)
SODIUM SERPL-SCNC: 131 MMOL/L — LOW (ref 135–145)
SODIUM SERPL-SCNC: 132 MMOL/L — LOW (ref 135–145)
TACROLIMUS SERPL-MCNC: 6.4 NG/ML — SIGNIFICANT CHANGE UP
WBC # BLD: 3.9 K/UL — SIGNIFICANT CHANGE UP (ref 3.8–10.5)
WBC # FLD AUTO: 3.9 K/UL — SIGNIFICANT CHANGE UP (ref 3.8–10.5)

## 2019-05-05 PROCEDURE — 99232 SBSQ HOSP IP/OBS MODERATE 35: CPT | Mod: GC

## 2019-05-05 PROCEDURE — 99233 SBSQ HOSP IP/OBS HIGH 50: CPT | Mod: GC

## 2019-05-05 RX ORDER — SODIUM POLYSTYRENE SULFONATE 4.1 MEQ/G
15 POWDER, FOR SUSPENSION ORAL ONCE
Qty: 0 | Refills: 0 | Status: COMPLETED | OUTPATIENT
Start: 2019-05-05 | End: 2019-05-05

## 2019-05-05 RX ORDER — GABAPENTIN 400 MG/1
100 CAPSULE ORAL DAILY
Qty: 0 | Refills: 0 | Status: DISCONTINUED | OUTPATIENT
Start: 2019-05-06 | End: 2019-05-09

## 2019-05-05 RX ORDER — FUROSEMIDE 40 MG
60 TABLET ORAL ONCE
Qty: 0 | Refills: 0 | Status: COMPLETED | OUTPATIENT
Start: 2019-05-05 | End: 2019-05-05

## 2019-05-05 RX ORDER — FUROSEMIDE 40 MG
60 TABLET ORAL ONCE
Qty: 0 | Refills: 0 | Status: DISCONTINUED | OUTPATIENT
Start: 2019-05-05 | End: 2019-05-05

## 2019-05-05 RX ORDER — FUROSEMIDE 40 MG
80 TABLET ORAL ONCE
Qty: 0 | Refills: 0 | Status: DISCONTINUED | OUTPATIENT
Start: 2019-05-05 | End: 2019-05-05

## 2019-05-05 RX ADMIN — HEPARIN SODIUM 5000 UNIT(S): 5000 INJECTION INTRAVENOUS; SUBCUTANEOUS at 06:29

## 2019-05-05 RX ADMIN — Medication 30 MILLIGRAM(S): at 06:28

## 2019-05-05 RX ADMIN — Medication 50 MILLIGRAM(S): at 06:28

## 2019-05-05 RX ADMIN — Medication 5 MILLIGRAM(S): at 06:28

## 2019-05-05 RX ADMIN — HEPARIN SODIUM 5000 UNIT(S): 5000 INJECTION INTRAVENOUS; SUBCUTANEOUS at 17:17

## 2019-05-05 RX ADMIN — Medication 6: at 12:16

## 2019-05-05 RX ADMIN — Medication 2 TABLET(S): at 06:31

## 2019-05-05 RX ADMIN — TACROLIMUS 2 MILLIGRAM(S): 5 CAPSULE ORAL at 06:27

## 2019-05-05 RX ADMIN — TAMSULOSIN HYDROCHLORIDE 0.4 MILLIGRAM(S): 0.4 CAPSULE ORAL at 21:45

## 2019-05-05 RX ADMIN — ONDANSETRON 4 MILLIGRAM(S): 8 TABLET, FILM COATED ORAL at 08:39

## 2019-05-05 RX ADMIN — Medication 2: at 08:07

## 2019-05-05 RX ADMIN — Medication 2: at 17:17

## 2019-05-05 RX ADMIN — GANCICLOVIR SODIUM 100 MILLIGRAM(S): 50 INJECTION, POWDER, LYOPHILIZED, FOR SOLUTION INTRAVENTRICULAR at 12:17

## 2019-05-05 RX ADMIN — SODIUM POLYSTYRENE SULFONATE 15 GRAM(S): 4.1 POWDER, FOR SUSPENSION ORAL at 11:07

## 2019-05-05 RX ADMIN — CEFEPIME 100 MILLIGRAM(S): 1 INJECTION, POWDER, FOR SOLUTION INTRAMUSCULAR; INTRAVENOUS at 20:10

## 2019-05-05 RX ADMIN — GABAPENTIN 300 MILLIGRAM(S): 400 CAPSULE ORAL at 12:16

## 2019-05-05 RX ADMIN — TACROLIMUS 2 MILLIGRAM(S): 5 CAPSULE ORAL at 18:04

## 2019-05-05 RX ADMIN — Medication 60 MILLIGRAM(S): at 09:49

## 2019-05-05 NOTE — PROGRESS NOTE ADULT - PROBLEM SELECTOR PROBLEM 2
Immunosuppression
UTI (urinary tract infection)

## 2019-05-05 NOTE — PROGRESS NOTE ADULT - ASSESSMENT
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     Problem:   ·	s/p DDRT, admitted with CHELA secondary to FK toxicity   - Improved uop with diuresis and stable K+.  No indication for HD today  - Check afternoon BMP/K+.  - Lasix 60mg IV x 1   - Strict I&O.  - Immuno: Bactrim and MMF on hold. Continue FK, Pred 5mg  - FU daily tac level   - PPX: on IV Gancyclovir for CMV viremia  - Low potassium diet  - Increase ambulation    ·	UTI  - cont cefepime for now  - Urine culture 4/3: NGTD    ·	HTN  - Well controlled  - cont Metoprolol 50mg daily and nifedipine xl 30mg daily    ·	CMV Viremia  - cont gancylcovir

## 2019-05-05 NOTE — PROGRESS NOTE ADULT - PROBLEM SELECTOR PLAN 3
K 5.4 today, non oliguric, suggest lasix and kayexalate as above, monitor k. K 5.4 today, non oliguric, give lasix 60 IVP, monitor k.

## 2019-05-05 NOTE — PROGRESS NOTE ADULT - PROBLEM SELECTOR PLAN 1
Pt with CHELA from supra therapeutic tacro level. Had HD 05/03/19 due to hyperkalemia. Labs reviewed today, scr has plateaued, K 5.4 consider lasix and kayexalate and repeat K at 3 pm if still elevated will consider HD otherwise will continue with medical management.  Decrease gabapentin to 100 mg daily due to chela.
- Cr elevated to 11.2, Bicarb down to 12 on admission, now improved to 15. Was on HNS w/150mEq NaHCO3, dced overnight since pt. became anuric.   - hypoNa (124) and hyperK improved (5.6<6.1).  -Repeat afternoon BMP today  -5/1 Renal US- no hydro/no KEREN    - Recently found to be CMV+ and started on PO Valganciclovir 4/19; now on IV ganciclovir 60. F/u new PCR.

## 2019-05-05 NOTE — PROGRESS NOTE ADULT - PROBLEM SELECTOR PLAN 2
Tacro level noted 5.9 resuming tacro 2 mg BID, c/w prednisone. Monitor fk level every day.
- Cefepime 1g q24  -UA +leukocytes   - F/u UCx

## 2019-05-05 NOTE — PROGRESS NOTE ADULT - PROBLEM SELECTOR PROBLEM 1
Kidney transplant recipient
Acute renal failure (ARF)

## 2019-05-05 NOTE — PROGRESS NOTE ADULT - ASSESSMENT
Kidney Transplant recipient, admitted with CHELA, elevated Tac level, abnormal electrolytes, DM, HTN, diarrhea, CMV Viremia.

## 2019-05-05 NOTE — PROGRESS NOTE ADULT - SUBJECTIVE AND OBJECTIVE BOX
Transplant Surgery - Multidisciplinary Rounds  --------------------------------------------------------------  DDRT  2018    Present: Patient seen with multidisciplinary team including Transplant Surgeon: Dr. Nicole, NP Roxann Jorgensen in am rounds and examined with Dr. Nicole.  Disciplines not in attendance will be notified of the plan.     HPI: 68M with a PMH of ESRD 2/2 bilateral nephrectomy from neoplasm, hypertension, and NIDDM status post HCV + DDRT () c/b DGF, ATN/mild rejection (2017 treated with steroids) and perinephric collection requiring drainage. 19 ureteral stent removal; recent admission in (2017) with E coli bacteremia 2/2 urosepsis treated w/ IV vanc/cefepime/zosyn and DC home on Cipro. 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    Admitted with severe CHELA secondary to FK toxicity and UTI.     Interval Events: Lasix 60g IV once yesterday with good response.  Restarted Flomax. Velez removed. Passed TOV. SCr slight downtrend, K+ 5.4 this morning. UOP 1170ml.  Left AVF less swollen. No pain, patent.  Remains on Cefepime for +Ua. Urine Cx ngtd. Tolerating regular diet. +BM.    Potential Discharge date: 19                                                     Education:  Medications    Plan of care:  See Below      MEDICATIONS  (STANDING):  cefepime   IVPB 1000 milliGRAM(s) IV Intermittent every 24 hours  dextrose 50% Injectable 25 Gram(s) IV Push once  dextrose 50% Injectable 25 Gram(s) IV Push once  diphenoxylate/atropine 2 Tablet(s) Oral three times a day  furosemide   Injectable 80 milliGRAM(s) IV Push once  gabapentin 300 milliGRAM(s) Oral daily  ganciclovir IVPB 60 milliGRAM(s) IV Intermittent daily  heparin  Injectable 5000 Unit(s) SubCutaneous every 12 hours  insulin lispro (HumaLOG) corrective regimen sliding scale   SubCutaneous three times a day before meals  insulin lispro (HumaLOG) corrective regimen sliding scale   SubCutaneous at bedtime  metoprolol tartrate 50 milliGRAM(s) Oral daily  NIFEdipine XL 30 milliGRAM(s) Oral daily  predniSONE   Tablet 5 milliGRAM(s) Oral daily  sodium polystyrene sulfonate Suspension 15 Gram(s) Oral once  tacrolimus 2 milliGRAM(s) Oral two times a day  tamsulosin 0.4 milliGRAM(s) Oral at bedtime    MEDICATIONS  (PRN):  chlorhexidine 4% Liquid 1 Application(s) Topical every 12 hours PRN solution  ondansetron Injectable 4 milliGRAM(s) IV Push every 6 hours PRN Nausea      PAST MEDICAL & SURGICAL HISTORY:  Medial meniscus tear: &#x27; 90&#x27;s  Right  Bowel obstruction: &#x27; 2017   surgically treated  Anal fissure: dx: &#x27; 2018   No surgery  Benign prostatic hypertrophy  Hepatitis C: In Donor Kidney: patient received treatment for Hep. 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      Vital Signs Last 24 Hrs  T(C): 36.9 (05 May 2019 09:36), Max: 37.1 (04 May 2019 20:56)  T(F): 98.4 (05 May 2019 09:36), Max: 98.7 (04 May 2019 20:56)  HR: 75 (05 May 2019 09:36) (75 - 84)  BP: 125/63 (05 May 2019 09:36) (113/57 - 156/75)  BP(mean): 93 (04 May 2019 20:56) (93 - 93)  RR: 18 (05 May 2019 09:36) (18 - 18)  SpO2: 96% (05 May 2019 09:36) (96% - 98%)    I&O's Summary    04 May 2019 07:  -  05 May 2019 07:00  --------------------------------------------------------  IN: 1420 mL / OUT: 1170 mL / NET: 250 mL    05 May 2019 07:  -  05 May 2019 10:13  --------------------------------------------------------  IN: 240 mL / OUT: 200 mL / NET: 40 mL                              9.0    3.9   )-----------( 142      ( 05 May 2019 06:30 )             26.4     05-05    132<L>  |  97  |  70<H>  ----------------------------<  153<H>  5.4<H>   |  20<L>  |  6.63<H>    Ca    8.7      05 May 2019 06:30  Phos  5.5     05-05  Mg     2.0     05-05      Tacrolimus (), Serum: 5.9 ng/mL ( @ 08:43)        Culture - Urine (collected 19 @ 13:34)  Source: .Urine  Final Report (19 @ 11:28):    <10,000 CFU/mL Normal Urogenital Rae    Culture - Blood (collected 19 @ 11:30)  Source: .Blood  Preliminary Report (19 @ 12:02):    No growth to date.    Culture - Blood (collected 19 @ 11:30)  Source: .Blood  Preliminary Report (19 @ 12:02):    No growth to date.      Review of systems  Gen: No weight changes  Skin: No rashes  Head/Eyes/Ears/Mouth: No headache; Normal hearing; Normal vision w/o blurriness; No sinus pain/discomfort, sore throat  Respiratory: No dyspnea, cough, wheezing, hemoptysis  CV: No chest pain, PND, orthopnea  GI: denies abdominal pain, diarrhea, constipation, nausea, vomiting, melena, hematochezia  : decreased urine output   MSK: No joint pain/swelling; no back pain; no edema  Neuro: No dizziness/lightheadedness, weakness, seizures, numbness, tingling  Heme: No easy bruising or bleeding  Endo: No heat/cold intolerance  Psych: No significant nervousness, anxiety, stress, depression  All other systems were reviewed and are negative, except as noted.        PHYSICAL EXAM:  Constitutional: Well developed / well nourished  Eyes: Anicteric, PERRLA  ENMT: nc/at  Neck: supple, no JVD  Respiratory: CTA B/L  Cardiovascular: RRR  Gastrointestinal: Multiple well healed incisions, soft, nontender, nondistended. 3x3cm reducible ventral hernia associated with prior midline ex lap scar.  Genitourinary: voiding, clear yellow urine  Extremities: SCD's in place and working bilaterally, LUE radiocephalic AVF with palpable thrill, localized edema improved  Vascular: Palpable dp pulses bilaterally  Neurological: A&O x3  Skin: well healed incision   Musculoskeletal: Moving all extremities  Psychiatric: Responsive

## 2019-05-06 LAB
ANION GAP SERPL CALC-SCNC: 15 MMOL/L — SIGNIFICANT CHANGE UP (ref 5–17)
APTT BLD: 30.1 SEC — SIGNIFICANT CHANGE UP (ref 27.5–36.3)
BUN SERPL-MCNC: 78 MG/DL — HIGH (ref 7–23)
CALCIUM SERPL-MCNC: 9 MG/DL — SIGNIFICANT CHANGE UP (ref 8.4–10.5)
CHLORIDE SERPL-SCNC: 95 MMOL/L — LOW (ref 96–108)
CO2 SERPL-SCNC: 21 MMOL/L — LOW (ref 22–31)
CREAT SERPL-MCNC: 6.7 MG/DL — HIGH (ref 0.5–1.3)
GLUCOSE BLDC GLUCOMTR-MCNC: 177 MG/DL — HIGH (ref 70–99)
GLUCOSE BLDC GLUCOMTR-MCNC: 184 MG/DL — HIGH (ref 70–99)
GLUCOSE BLDC GLUCOMTR-MCNC: 258 MG/DL — HIGH (ref 70–99)
GLUCOSE BLDC GLUCOMTR-MCNC: 272 MG/DL — HIGH (ref 70–99)
GLUCOSE BLDC GLUCOMTR-MCNC: 283 MG/DL — HIGH (ref 70–99)
GLUCOSE SERPL-MCNC: 165 MG/DL — HIGH (ref 70–99)
HCT VFR BLD CALC: 26.8 % — LOW (ref 39–50)
HGB BLD-MCNC: 9 G/DL — LOW (ref 13–17)
MAGNESIUM SERPL-MCNC: 1.9 MG/DL — SIGNIFICANT CHANGE UP (ref 1.6–2.6)
MCHC RBC-ENTMCNC: 31.8 PG — SIGNIFICANT CHANGE UP (ref 27–34)
MCHC RBC-ENTMCNC: 33.6 GM/DL — SIGNIFICANT CHANGE UP (ref 32–36)
MCV RBC AUTO: 94.5 FL — SIGNIFICANT CHANGE UP (ref 80–100)
PHOSPHATE SERPL-MCNC: 5.4 MG/DL — HIGH (ref 2.5–4.5)
PLATELET # BLD AUTO: 167 K/UL — SIGNIFICANT CHANGE UP (ref 150–400)
POTASSIUM SERPL-MCNC: 4.9 MMOL/L — SIGNIFICANT CHANGE UP (ref 3.5–5.3)
POTASSIUM SERPL-SCNC: 4.9 MMOL/L — SIGNIFICANT CHANGE UP (ref 3.5–5.3)
RBC # BLD: 2.84 M/UL — LOW (ref 4.2–5.8)
RBC # FLD: 14.4 % — SIGNIFICANT CHANGE UP (ref 10.3–14.5)
SODIUM SERPL-SCNC: 131 MMOL/L — LOW (ref 135–145)
TACROLIMUS SERPL-MCNC: 6.3 NG/ML — SIGNIFICANT CHANGE UP
WBC # BLD: 3.2 K/UL — LOW (ref 3.8–10.5)
WBC # FLD AUTO: 3.2 K/UL — LOW (ref 3.8–10.5)

## 2019-05-06 PROCEDURE — 99232 SBSQ HOSP IP/OBS MODERATE 35: CPT

## 2019-05-06 PROCEDURE — 99232 SBSQ HOSP IP/OBS MODERATE 35: CPT | Mod: GC

## 2019-05-06 RX ORDER — ATOVAQUONE 750 MG/5ML
750 SUSPENSION ORAL
Qty: 0 | Refills: 0 | Status: DISCONTINUED | OUTPATIENT
Start: 2019-05-06 | End: 2019-05-07

## 2019-05-06 RX ADMIN — Medication 2 TABLET(S): at 13:50

## 2019-05-06 RX ADMIN — HEPARIN SODIUM 5000 UNIT(S): 5000 INJECTION INTRAVENOUS; SUBCUTANEOUS at 06:24

## 2019-05-06 RX ADMIN — TACROLIMUS 2 MILLIGRAM(S): 5 CAPSULE ORAL at 06:24

## 2019-05-06 RX ADMIN — GANCICLOVIR SODIUM 100 MILLIGRAM(S): 50 INJECTION, POWDER, LYOPHILIZED, FOR SOLUTION INTRAVENTRICULAR at 12:36

## 2019-05-06 RX ADMIN — HEPARIN SODIUM 5000 UNIT(S): 5000 INJECTION INTRAVENOUS; SUBCUTANEOUS at 17:55

## 2019-05-06 RX ADMIN — Medication 30 MILLIGRAM(S): at 06:23

## 2019-05-06 RX ADMIN — CEFEPIME 100 MILLIGRAM(S): 1 INJECTION, POWDER, FOR SOLUTION INTRAMUSCULAR; INTRAVENOUS at 20:00

## 2019-05-06 RX ADMIN — Medication 2 TABLET(S): at 06:27

## 2019-05-06 RX ADMIN — Medication 5 MILLIGRAM(S): at 06:24

## 2019-05-06 RX ADMIN — Medication 6: at 12:31

## 2019-05-06 RX ADMIN — Medication 2: at 08:13

## 2019-05-06 RX ADMIN — Medication 50 MILLIGRAM(S): at 06:24

## 2019-05-06 RX ADMIN — GABAPENTIN 100 MILLIGRAM(S): 400 CAPSULE ORAL at 12:32

## 2019-05-06 RX ADMIN — Medication 2 TABLET(S): at 22:53

## 2019-05-06 RX ADMIN — ATOVAQUONE 750 MILLIGRAM(S): 750 SUSPENSION ORAL at 18:09

## 2019-05-06 RX ADMIN — Medication 6: at 17:54

## 2019-05-06 RX ADMIN — TAMSULOSIN HYDROCHLORIDE 0.4 MILLIGRAM(S): 0.4 CAPSULE ORAL at 22:53

## 2019-05-06 RX ADMIN — TACROLIMUS 2 MILLIGRAM(S): 5 CAPSULE ORAL at 17:54

## 2019-05-06 NOTE — PROGRESS NOTE ADULT - NSHPATTENDINGPLANDISCUSS_GEN_ALL_CORE
NP, nurse
NP, nurse, nephrologist
Patient seen on multidisciplinary rounds with Transplant surgeons, nephrologist, PA, pharmacist, and nurse.
Patient seen on multidisciplinary rounds with Transplant surgeons, nephrologist, NP/PA, pharmacist, and nurse.
dialysis team
Transplant Team, dialysis team
Transplant Team
transplant surgery team

## 2019-05-06 NOTE — PROGRESS NOTE ADULT - SUBJECTIVE AND OBJECTIVE BOX
[facility] DIVISION OF KIDNEY DISEASES AND HYPERTENSION -- FOLLOW UP NOTE  --------------------------------------------------------------------------------  Chief Complaint:  weakness, CHELA    24 hour events/subjective:  Pt feels ok.  Urinating little more, eating well.       PAST HISTORY  --------------------------------------------------------------------------------  No significant changes to PMH, PSH, FHx, SHx, unless otherwise noted    ALLERGIES & MEDICATIONS  --------------------------------------------------------------------------------  Allergies    No Known Allergies    Intolerances      Standing Inpatient Medications  atovaquone Suspension 750 milliGRAM(s) Oral two times a day  cefepime   IVPB 1000 milliGRAM(s) IV Intermittent every 24 hours  dextrose 50% Injectable 25 Gram(s) IV Push once  dextrose 50% Injectable 25 Gram(s) IV Push once  diphenoxylate/atropine 2 Tablet(s) Oral three times a day  gabapentin 100 milliGRAM(s) Oral daily  ganciclovir IVPB 60 milliGRAM(s) IV Intermittent daily  heparin  Injectable 5000 Unit(s) SubCutaneous every 12 hours  insulin lispro (HumaLOG) corrective regimen sliding scale   SubCutaneous three times a day before meals  insulin lispro (HumaLOG) corrective regimen sliding scale   SubCutaneous at bedtime  metoprolol tartrate 50 milliGRAM(s) Oral daily  NIFEdipine XL 30 milliGRAM(s) Oral daily  predniSONE   Tablet 5 milliGRAM(s) Oral daily  tacrolimus 2 milliGRAM(s) Oral two times a day  tamsulosin 0.4 milliGRAM(s) Oral at bedtime    PRN Inpatient Medications  chlorhexidine 4% Liquid 1 Application(s) Topical every 12 hours PRN  ondansetron Injectable 4 milliGRAM(s) IV Push every 6 hours PRN      REVIEW OF SYSTEMS  --------------------------------------------------------------------------------  Gen: mild fatigue, weakness   Skin: No rashes  Head/Eyes/Ears/Mouth: No headache;No sore throat  Respiratory: No dyspnea, cough,   CV: No chest pain, PND, orthopnea  GI: No abdominal pain, slight diarrhea but chronic  Transplant: No pain  : No increased frequency, dysuria, hematuria, nocturia  MSK: No joint pain/swelling; no back pain; no edema  Neuro: No dizziness/lightheadedness, weakness, seizures, numbness, tingling  Psych: No significant nervousness, anxiety, stress, depression    All other systems were reviewed and are negative, except as noted.    VITALS/PHYSICAL EXAM  --------------------------------------------------------------------------------  T(C): 37 (05-06-19 @ 05:36), Max: 37 (05-06-19 @ 05:36)  HR: 85 (05-06-19 @ 05:36) (75 - 87)  BP: 149/67 (05-06-19 @ 05:36) (125/63 - 154/60)  RR: 18 (05-06-19 @ 05:36) (16 - 18)  SpO2: 98% (05-06-19 @ 05:36) (95% - 98%)  Wt(kg): --        05-05-19 @ 07:01  -  05-06-19 @ 07:00  --------------------------------------------------------  IN: 660 mL / OUT: 1300 mL / NET: -640 mL      Physical Exam:  	Gen: NAD  	HEENT: PERRL, supple neck, clear oropharynx  	Pulm: CTA B/L  	CV: RRR, S1S2; no rub  	Back: No spinal or CVA tenderness; no sacral edema  	Abd: +BS, soft, nontender/nondistended                      Transplant: No tenderness, swelling  	: No suprapubic tenderness  	UE: Warm, FROM, intact strength; no edema; no asterixis  	LE: Warm, FROM, intact strength; no edema  	Neuro: No focal deficits  	Psych: Normal affect and mood  	Skin: Warm, without rashes      LABS/STUDIES  --------------------------------------------------------------------------------              9.0    3.2   >-----------<  167      [05-06-19 @ 06:29]              26.8     131  |  95  |  78  ----------------------------<  165      [05-06-19 @ 06:27]  4.9   |  21  |  6.70        Ca     9.0     [05-06-19 @ 06:27]      Mg     1.9     [05-06-19 @ 06:27]      Phos  5.4     [05-06-19 @ 06:27]        PTT: 30.1       [05-06-19 @ 06:28]      Creatinine Trend:  SCr 6.70 [05-06 @ 06:27]  SCr 6.96 [05-05 @ 17:00]  SCr 6.63 [05-05 @ 06:30]  SCr 6.87 [05-04 @ 16:49]  SCr 6.66 [05-04 @ 15:40]    Tacrolimus (), Serum: 6.4 ng/mL (05-05 @ 09:29)  Tacrolimus (), Serum: 5.9 ng/mL (05-04 @ 08:43)  Tacrolimus (), Serum: 13.1 ng/mL (05-03 @ 07:29)  Tacrolimus (), Serum: 23.3 ng/mL (05-02 @ 08:02)            Urinalysis - [05-01-19 @ 15:31]      Color Yellow / Appearance Turbid / SG 1.020 / pH 6.0      Gluc Negative / Ketone Negative  / Bili Negative / Urobili Negative       Blood Moderate / Protein 300 mg/dL / Leuk Est Large / Nitrite Negative      RBC 25 / WBC >50 / Hyaline 2 / Gran  / Sq Epi  / Non Sq Epi 2 / Bacteria TNTC      HbA1c 6.4      [05-02-19 @ 08:36]    HBsAb <3.0      [05-03-19 @ 22:44]  HBsAg Nonreact      [05-03-19 @ 22:44]  HBcAb Nonreact      [05-03-19 @ 22:44]  HCV 0.07, Nonreact      [05-03-19 @ 22:44]

## 2019-05-06 NOTE — PROGRESS NOTE ADULT - SUBJECTIVE AND OBJECTIVE BOX
Transplant Surgery - Multidisciplinary Rounds  --------------------------------------------------------------  DDRT    Date: 2018    Present:   Patient seen with multidisciplinary team including ( Transplant Surgeon: Dr. Russo, Dr. Castaneda, Dr. Yao, Dr. Nicole, Dr. Heath. Transplant Nephrologist: Dr. Multani, Dr. Bhakta.  Pharmacist: Abhishek Harrell. NP/PAs: Roxann Jorgensen, Ilir Thomas, Silva William, Delma Powers,  Nick Harp and Brielle Pimentel in am rounds and examined with    *****    Disciplines not in attendance will be notified of the plan.     HPI: 68M with a PMH of ESRD 2/2 bilateral nephrectomy from neoplasm, hypertension, and NIDDM status post HCV + DDRT () c/b DGF, ATN/mild rejection (2017 treated with steroids) and perinephric collection requiring drainage. 19 ureteral stent removal; recent admission in (2017) with E coli bacteremia 2/2 urosepsis treated w/ IV vanc/cefepime/zosyn and DC home on Cipro. 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    Admitted with severe CHELA secondary to FK toxicity, UTI, and CMV+.     Interval Events: Lasix 60g IV once yesterday without initial response however voided 600cc o/n.  SCr slight downtrend, K 4.9 this morning. +BM.    Potential Discharge date: pending final dispo medication plan    Education:  Medications    Plan of care:  See Below    MEDICATIONS  (STANDING):  cefepime   IVPB 1000 milliGRAM(s) IV Intermittent every 24 hours  dextrose 50% Injectable 25 Gram(s) IV Push once  dextrose 50% Injectable 25 Gram(s) IV Push once  diphenoxylate/atropine 2 Tablet(s) Oral three times a day  gabapentin 100 milliGRAM(s) Oral daily  ganciclovir IVPB 60 milliGRAM(s) IV Intermittent daily  heparin  Injectable 5000 Unit(s) SubCutaneous every 12 hours  insulin lispro (HumaLOG) corrective regimen sliding scale   SubCutaneous three times a day before meals  insulin lispro (HumaLOG) corrective regimen sliding scale   SubCutaneous at bedtime  metoprolol tartrate 50 milliGRAM(s) Oral daily  NIFEdipine XL 30 milliGRAM(s) Oral daily  predniSONE   Tablet 5 milliGRAM(s) Oral daily  tacrolimus 2 milliGRAM(s) Oral two times a day  tamsulosin 0.4 milliGRAM(s) Oral at bedtime    MEDICATIONS  (PRN):  chlorhexidine 4% Liquid 1 Application(s) Topical every 12 hours PRN solution  ondansetron Injectable 4 milliGRAM(s) IV Push every 6 hours PRN Nausea      PAST MEDICAL & SURGICAL HISTORY:  Medial meniscus tear: &#x27; 90&#x27;s  Right  Bowel obstruction: &#x27; 2017   surgically treated  Anal fissure: dx: &#x27; 2018   No surgery  Benign prostatic hypertrophy  Hepatitis C: In Donor Kidney: patient received treatment for Hep. C : post treatment: patient  tested Negative for Hep C  Kidney neoplasm: dx: &#x27;  : Left      s/p bilateral Nephrectomy &#x27; 2015  ESRD (end stage renal disease): On Dialysis &#x27;  to 2018  Type 2 diabetes mellitus: dx: &#x27;88  HTN (hypertension)  Kidney transplant recipient: 2018  @ [facility] :  +  Hep C Donor  S/P right knee arthroscopy: &#x27; 90&#x27;s  H/O ileostomy: &#x27; 2017   for 3 months. 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      Vital Signs Last 24 Hrs  T(C): 37 (06 May 2019 05:36), Max: 37 (06 May 2019 05:36)  T(F): 98.6 (06 May 2019 05:36), Max: 98.6 (06 May 2019 05:36)  HR: 85 (06 May 2019 05:36) (75 - 87)  BP: 149/67 (06 May 2019 05:36) (125/63 - 154/60)  BP(mean): --  RR: 18 (06 May 2019 05:36) (16 - 18)  SpO2: 98% (06 May 2019 05:36) (95% - 98%)    I&O's Summary    05 May 2019 07:01  -  06 May 2019 07:00  --------------------------------------------------------  IN: 660 mL / OUT: 1300 mL / NET: -640 mL                              9.0    3.2   )-----------( 167      ( 06 May 2019 06:29 )             26.8     05-06    131<L>  |  95<L>  |  78<H>  ----------------------------<  165<H>  4.9   |  21<L>  |  6.70<H>    Ca    9.0      06 May 2019 06:27  Phos  5.4     -  Mg     1.9     -      Tacrolimus (), Serum: 6.4 ng/mL ( @ 09:29)        Culture - Urine (collected 19 @ 13:34)  Source: .Urine  Final Report (19 @ 11:28):    <10,000 CFU/mL Normal Urogenital Rae    Culture - Blood (collected 19 @ 11:30)  Source: .Blood  Preliminary Report (19 @ 12:02):    No growth to date.    Culture - Blood (collected 19 @ 11:30)  Source: .Blood  Preliminary Report (19 @ 12:02):    No growth to date.        Review of systems  Gen: No weight changes, fatigue, fevers/chills, weakness  Skin: No rashes  Head/Eyes/Ears/Mouth: No headache; Normal hearing; Normal vision w/o blurriness; No sinus pain/discomfort, sore throat  Respiratory: No dyspnea, cough, wheezing, hemoptysis  CV: No chest pain, PND, orthopnea  GI: improved diarrhea, denies abd pain, constipation, nausea, vomiting, melena, hematochezia  : No increased frequency, dysuria, hematuria, nocturia  MSK: No joint pain/swelling; no back pain; no edema  Neuro: No dizziness/lightheadedness, weakness, seizures, numbness, tingling  Heme: No easy bruising or bleeding  Endo: No heat/cold intolerance  Psych: No significant nervousness, anxiety, stress, depression  All other systems were reviewed and are negative, except as noted.      PHYSICAL EXAM:  Constitutional: Age appropriate male in NAD  Eyes: Anicteric, PERRLA  Respiratory: CTA B/L, no increased work of breathing  Cardiovascular: RRR  Gastrointestinal: Multiple well healed incisions, soft, nontender, nondistended. 3x3cm reducible ventral hernia associated with prior midline ex lap scar.  Genitourinary: voiding, clear yellow urine  Extremities: SCD's in place and working bilaterally, LUE radiocephalic AVF with palpable thrill  Vascular: Palpable dp pulses bilaterally  Neurological: A&O x3  Musculoskeletal: Moving all extremities  Psychiatric: Responsive Transplant Surgery - Multidisciplinary Rounds  --------------------------------------------------------------  DDRT    Date: 2018    Present:   Patient seen with multidisciplinary team including ( Transplant Surgeon: Dr. Castaneda, Dr. Yao, Dr. Nicole Transplant Nephrologist:Dr. Bhakta.  Pharmacist: Abhishek Harrell. NP/PAs: Roxann Jorgensen,  in am rounds and examined with Dr. Nicole    Disciplines not in attendance will be notified of the plan.     HPI: 68M with a PMH of ESRD 2/2 bilateral nephrectomy from neoplasm, hypertension, and NIDDM status post HCV + DDRT () c/b DGF, ATN/mild rejection (2017 treated with steroids) and perinephric collection requiring drainage. 19 ureteral stent removal; recent admission in (2017) with E coli bacteremia 2/2 urosepsis treated w/ IV vanc/cefepime/zosyn and DC home on Cipro. 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    Admitted with severe CHELA secondary to FK toxicity, UTI, and CMV+.     Interval Events: Lasix 60g IV once yesterday without initial response however voided 600cc o/n.  SCr slight downtrend, K 4.9 this morning. +BM.    Potential Discharge date: pending final dispo medication plan    Education:  Medications    Plan of care:  See Below    MEDICATIONS  (STANDING):  cefepime   IVPB 1000 milliGRAM(s) IV Intermittent every 24 hours  dextrose 50% Injectable 25 Gram(s) IV Push once  dextrose 50% Injectable 25 Gram(s) IV Push once  diphenoxylate/atropine 2 Tablet(s) Oral three times a day  gabapentin 100 milliGRAM(s) Oral daily  ganciclovir IVPB 60 milliGRAM(s) IV Intermittent daily  heparin  Injectable 5000 Unit(s) SubCutaneous every 12 hours  insulin lispro (HumaLOG) corrective regimen sliding scale   SubCutaneous three times a day before meals  insulin lispro (HumaLOG) corrective regimen sliding scale   SubCutaneous at bedtime  metoprolol tartrate 50 milliGRAM(s) Oral daily  NIFEdipine XL 30 milliGRAM(s) Oral daily  predniSONE   Tablet 5 milliGRAM(s) Oral daily  tacrolimus 2 milliGRAM(s) Oral two times a day  tamsulosin 0.4 milliGRAM(s) Oral at bedtime    MEDICATIONS  (PRN):  chlorhexidine 4% Liquid 1 Application(s) Topical every 12 hours PRN solution  ondansetron Injectable 4 milliGRAM(s) IV Push every 6 hours PRN Nausea      PAST MEDICAL & SURGICAL HISTORY:  Medial meniscus tear: &#x27; 90&#x27;s  Right  Bowel obstruction: &#x27; 2017   surgically treated  Anal fissure: dx: &#x27; 2018   No surgery  Benign prostatic hypertrophy  Hepatitis C: In Donor Kidney: patient received treatment for Hep. C : post treatment: patient  tested Negative for Hep C  Kidney neoplasm: dx: &#x27; 2015 : Left      s/p bilateral Nephrectomy &#x27; 2015  ESRD (end stage renal disease): On Dialysis &#x27;  to 2018  Type 2 diabetes mellitus: dx: &#x27;88  HTN (hypertension)  Kidney transplant recipient: 2018  @ [facility] :  +  Hep C Donor  S/P right knee arthroscopy: &#x27; 90&#x27;s  H/O ileostomy: &#x27; 2017   for 3 months. 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      Vital Signs Last 24 Hrs  T(C): 37 (06 May 2019 05:36), Max: 37 (06 May 2019 05:36)  T(F): 98.6 (06 May 2019 05:36), Max: 98.6 (06 May 2019 05:36)  HR: 85 (06 May 2019 05:36) (75 - 87)  BP: 149/67 (06 May 2019 05:36) (125/63 - 154/60)  BP(mean): --  RR: 18 (06 May 2019 05:36) (16 - 18)  SpO2: 98% (06 May 2019 05:36) (95% - 98%)    I&O's Summary    05 May 2019 07:01  -  06 May 2019 07:00  --------------------------------------------------------  IN: 660 mL / OUT: 1300 mL / NET: -640 mL                              9.0    3.2   )-----------( 167      ( 06 May 2019 06:29 )             26.8     05-06    131<L>  |  95<L>  |  78<H>  ----------------------------<  165<H>  4.9   |  21<L>  |  6.70<H>    Ca    9.0      06 May 2019 06:27  Phos  5.4     -  Mg     1.9     -      Tacrolimus (), Serum: 6.4 ng/mL ( @ 09:29)        Culture - Urine (collected 19 @ 13:34)  Source: .Urine  Final Report (19 @ 11:28):    <10,000 CFU/mL Normal Urogenital Rae    Culture - Blood (collected 19 @ 11:30)  Source: .Blood  Preliminary Report (19 @ 12:02):    No growth to date.    Culture - Blood (collected 19 @ 11:30)  Source: .Blood  Preliminary Report (19 @ 12:02):    No growth to date.        Review of systems  Gen: No weight changes, fatigue, fevers/chills, weakness  Skin: No rashes  Head/Eyes/Ears/Mouth: No headache; Normal hearing; Normal vision w/o blurriness; No sinus pain/discomfort, sore throat  Respiratory: No dyspnea, cough, wheezing, hemoptysis  CV: No chest pain, PND, orthopnea  GI: improved diarrhea, denies abd pain, constipation, nausea, vomiting, melena, hematochezia  : No increased frequency, dysuria, hematuria, nocturia  MSK: No joint pain/swelling; no back pain; no edema  Neuro: No dizziness/lightheadedness, weakness, seizures, numbness, tingling  Heme: No easy bruising or bleeding  Endo: No heat/cold intolerance  Psych: No significant nervousness, anxiety, stress, depression  All other systems were reviewed and are negative, except as noted.      PHYSICAL EXAM:  Constitutional: Age appropriate male in NAD  Eyes: Anicteric, PERRLA  Respiratory: CTA B/L, no increased work of breathing  Cardiovascular: RRR  Gastrointestinal: Multiple well healed incisions, soft, nontender, nondistended. 3x3cm reducible ventral hernia associated with prior midline ex lap scar.  Genitourinary: voiding, clear yellow urine  Extremities: SCD's in place and working bilaterally, LUE radiocephalic AVF with palpable thrill and decreased swelling  Vascular: Palpable dp pulses bilaterally  Neurological: A&O x3  Musculoskeletal: Moving all extremities  Psychiatric: Responsive

## 2019-05-06 NOTE — PROGRESS NOTE ADULT - ASSESSMENT
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    1.  Renal transplant - CHELA possibly from pyelonephritis, and tacrolimus toxicity.  Slowly improving, no indication for dialysis.  No hydro on sonogram.      2.  CHELA - as above.  Hyperkalemia improved today.    3.  Immunosuppression - Back on tacro 2/2 and pred 5.  Hold MMF as pt leukopenic and with severe infections - UTI and CMV  4.  UTI/ pyelonephritis - Cont cefepime for at least 7 days.  Urine culture negative but sent after a day of cefepime.    5.  Hyponatremia - improving/ stable.  Will likely resolve with improvement of renal function.    6.  HCV - genotype 1a s/p epclusa treatment with cure.  7.  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  Check resistance panel and continue ganciclovir.  May need to increase to 10mgs/kg.    8.  DM2 - Hold for now and cover with sliding scale.  9.  Oxalaturia - hold calcium and bicitra for now.  Will resume at discharge.   10.  HTN - controlled on metoprolol.

## 2019-05-06 NOTE — PROGRESS NOTE ADULT - ASSESSMENT
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     Problem:   ·	s/p DDRT, admitted with CHELA secondary to FK toxicity   - Improved uop and stable K+.  No emergent indication for HD today  - Lasix 60mg IV x 1   - Strict I&O.  - Immuno: Bactrim and MMF on hold. Continue FK, Pred 5mg  - FU daily tac level   - PPX: on IV Gancyclovir for CMV viremia  - Low potassium diet  - Increase ambulation    ·	UTI  - cont cefepime for now  - Urine culture 4/3: NGTD, obtained on Cefepime. Will treat empirically.    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     Problem:   ·	s/p DDRT, admitted with CHELA secondary to FK toxicity   - Improved uop and stable K+.  No emergent indication for HD today  - Holding lasix  - Strict I&O.  - Immuno:  MMF on hold. Continue FK, Pred 5mg. Will restart home Atovaquone.  - FU daily tac level   - PPX: on IV Gancyclovir for CMV viremia  - Low potassium diet  - Increase ambulation    ·	UTI  - cont cefepime for now  - Urine culture 4/3: NGTD, obtained on Cefepime. Will treat empirically.    ·	HTN  - Well controlled  - cont Metoprolol 50mg daily and nifedipine xl 30mg daily    ·	CMV Viremia  - cont gancylcovir   - Will obtain new PCR with resistance panel

## 2019-05-07 LAB
ANION GAP SERPL CALC-SCNC: 14 MMOL/L — SIGNIFICANT CHANGE UP (ref 5–17)
APTT BLD: 26.9 SEC — LOW (ref 27.5–36.3)
BUN SERPL-MCNC: 82 MG/DL — HIGH (ref 7–23)
CALCIUM SERPL-MCNC: 9.1 MG/DL — SIGNIFICANT CHANGE UP (ref 8.4–10.5)
CHLORIDE SERPL-SCNC: 97 MMOL/L — SIGNIFICANT CHANGE UP (ref 96–108)
CO2 SERPL-SCNC: 20 MMOL/L — LOW (ref 22–31)
CREAT SERPL-MCNC: 6.16 MG/DL — HIGH (ref 0.5–1.3)
CULTURE RESULTS: SIGNIFICANT CHANGE UP
CULTURE RESULTS: SIGNIFICANT CHANGE UP
GLUCOSE BLDC GLUCOMTR-MCNC: 179 MG/DL — HIGH (ref 70–99)
GLUCOSE BLDC GLUCOMTR-MCNC: 183 MG/DL — HIGH (ref 70–99)
GLUCOSE BLDC GLUCOMTR-MCNC: 226 MG/DL — HIGH (ref 70–99)
GLUCOSE BLDC GLUCOMTR-MCNC: 301 MG/DL — HIGH (ref 70–99)
GLUCOSE SERPL-MCNC: 163 MG/DL — HIGH (ref 70–99)
HCT VFR BLD CALC: 26 % — LOW (ref 39–50)
HGB BLD-MCNC: 8.7 G/DL — LOW (ref 13–17)
MAGNESIUM SERPL-MCNC: 1.7 MG/DL — SIGNIFICANT CHANGE UP (ref 1.6–2.6)
MCHC RBC-ENTMCNC: 32 PG — SIGNIFICANT CHANGE UP (ref 27–34)
MCHC RBC-ENTMCNC: 33.5 GM/DL — SIGNIFICANT CHANGE UP (ref 32–36)
MCV RBC AUTO: 95.6 FL — SIGNIFICANT CHANGE UP (ref 80–100)
PHOSPHATE SERPL-MCNC: 4.9 MG/DL — HIGH (ref 2.5–4.5)
PLATELET # BLD AUTO: 170 K/UL — SIGNIFICANT CHANGE UP (ref 150–400)
POTASSIUM SERPL-MCNC: 5.6 MMOL/L — HIGH (ref 3.5–5.3)
POTASSIUM SERPL-SCNC: 5.6 MMOL/L — HIGH (ref 3.5–5.3)
RBC # BLD: 2.72 M/UL — LOW (ref 4.2–5.8)
RBC # FLD: 14.6 % — HIGH (ref 10.3–14.5)
SODIUM SERPL-SCNC: 131 MMOL/L — LOW (ref 135–145)
SPECIMEN SOURCE: SIGNIFICANT CHANGE UP
SPECIMEN SOURCE: SIGNIFICANT CHANGE UP
TACROLIMUS SERPL-MCNC: 4.3 NG/ML — SIGNIFICANT CHANGE UP
WBC # BLD: 3 K/UL — LOW (ref 3.8–10.5)
WBC # FLD AUTO: 3 K/UL — LOW (ref 3.8–10.5)

## 2019-05-07 PROCEDURE — 99232 SBSQ HOSP IP/OBS MODERATE 35: CPT

## 2019-05-07 RX ORDER — FUROSEMIDE 40 MG
60 TABLET ORAL ONCE
Qty: 0 | Refills: 0 | Status: COMPLETED | OUTPATIENT
Start: 2019-05-07 | End: 2019-05-07

## 2019-05-07 RX ORDER — SODIUM CHLORIDE 9 MG/ML
1000 INJECTION, SOLUTION INTRAVENOUS
Qty: 0 | Refills: 0 | Status: DISCONTINUED | OUTPATIENT
Start: 2019-05-07 | End: 2019-05-08

## 2019-05-07 RX ORDER — ATOVAQUONE 750 MG/5ML
1500 SUSPENSION ORAL DAILY
Qty: 0 | Refills: 0 | Status: DISCONTINUED | OUTPATIENT
Start: 2019-05-08 | End: 2019-05-09

## 2019-05-07 RX ADMIN — CEFEPIME 100 MILLIGRAM(S): 1 INJECTION, POWDER, FOR SOLUTION INTRAMUSCULAR; INTRAVENOUS at 20:19

## 2019-05-07 RX ADMIN — Medication 4: at 12:29

## 2019-05-07 RX ADMIN — Medication 50 MILLIGRAM(S): at 06:29

## 2019-05-07 RX ADMIN — Medication 2 TABLET(S): at 06:28

## 2019-05-07 RX ADMIN — Medication 30 MILLIGRAM(S): at 06:29

## 2019-05-07 RX ADMIN — Medication 60 MILLIGRAM(S): at 11:29

## 2019-05-07 RX ADMIN — TACROLIMUS 2 MILLIGRAM(S): 5 CAPSULE ORAL at 06:29

## 2019-05-07 RX ADMIN — GABAPENTIN 100 MILLIGRAM(S): 400 CAPSULE ORAL at 11:29

## 2019-05-07 RX ADMIN — Medication 5 MILLIGRAM(S): at 06:29

## 2019-05-07 RX ADMIN — GANCICLOVIR SODIUM 100 MILLIGRAM(S): 50 INJECTION, POWDER, LYOPHILIZED, FOR SOLUTION INTRAVENTRICULAR at 12:59

## 2019-05-07 RX ADMIN — TAMSULOSIN HYDROCHLORIDE 0.4 MILLIGRAM(S): 0.4 CAPSULE ORAL at 21:57

## 2019-05-07 RX ADMIN — HEPARIN SODIUM 5000 UNIT(S): 5000 INJECTION INTRAVENOUS; SUBCUTANEOUS at 17:02

## 2019-05-07 RX ADMIN — ATOVAQUONE 750 MILLIGRAM(S): 750 SUSPENSION ORAL at 06:28

## 2019-05-07 RX ADMIN — TACROLIMUS 2 MILLIGRAM(S): 5 CAPSULE ORAL at 18:02

## 2019-05-07 RX ADMIN — Medication 8: at 17:01

## 2019-05-07 RX ADMIN — Medication 2 TABLET(S): at 21:57

## 2019-05-07 RX ADMIN — SODIUM CHLORIDE 100 MILLILITER(S): 9 INJECTION, SOLUTION INTRAVENOUS at 11:29

## 2019-05-07 RX ADMIN — Medication 2 TABLET(S): at 14:09

## 2019-05-07 RX ADMIN — HEPARIN SODIUM 5000 UNIT(S): 5000 INJECTION INTRAVENOUS; SUBCUTANEOUS at 06:30

## 2019-05-07 RX ADMIN — Medication 2: at 08:29

## 2019-05-07 NOTE — PROGRESS NOTE ADULT - ASSESSMENT
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    1.  Renal transplant - CHELA possibly from pyelonephritis, and tacrolimus toxicity.  Slowly improving, no indication for dialysis.  No hydro on sonogram.      2.  CHELA - as above.  Hyperkalemia worse today - will give 1L of IVF and 80mgs IV lasix.  Should improve as renal function improves.  SQ heparin may also be contributing.    3.  Immunosuppression - Back on tacro and pred 5.  Hold MMF as pt leukopenic and with severe infections - UTI and CMV  4.  UTI/ pyelonephritis - Cont cefepime for at least 7 days - last dose tomorrow.  Urine culture negative but sent after a day of cefepime.    5.  Hyponatremia - improving/ stable.  Will likely resolve with improvement of renal function.    6.  HCV - genotype 1a s/p epclusa treatment with cure.  7.  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  Check resistance panel - sent yesterday.  Cont IV ganciclovir.  May need to increase to 10mgs/kg.    8.  DM2 - Hold for now and cover with sliding scale.  9.  Oxalaturia - hold calcium and bicitra for now.  Will resume at discharge.   10.  HTN - controlled on metoprolol.

## 2019-05-07 NOTE — DIETITIAN INITIAL EVALUATION ADULT. - OTHER INFO
Pt seen for LOS initial assessment. Pt reports appetite has improved since admission now back up to baseline, reports > 75% po intakes of meals. No GI distress, +BM yesterday. Pt missing some teeth, but denies chewing/swallowing difficulties. NKFA. PTA takes vitamin  D, B12, and niacin supplements.

## 2019-05-07 NOTE — DIETITIAN INITIAL EVALUATION ADULT. - PROBLEM SELECTOR PLAN 1
- Cr now elevated to 11.22, Bicarb down to 12 with hypoNa (123) and hyperK (6.1) without EKG changes.  - HNS w/150mEq NaHCO3 @ 150cc/hr  - STAT transplant renal U/S  - Recently found to be CMV+ and started on PO Valganciclovir will check PCR and start IV ganciclovir.  - Would appreciate further recs from transplant nephrology

## 2019-05-07 NOTE — DIETITIAN INITIAL EVALUATION ADULT. - NS AS NUTRI INTERV ED CONTENT
Pt able to teach back on Consistent Carbohydrate Post Renal Transplant food safety guidelines. Verbally reviewed diet education, RD contact information provided.

## 2019-05-07 NOTE — PROGRESS NOTE ADULT - SUBJECTIVE AND OBJECTIVE BOX
Transplant Surgery - Multidisciplinary Rounds  --------------------------------------------------------------  DDRT  Date:  2018       Present:   Patient seen with multidisciplinary team including ( Transplant Surgeon: Dr. Castaneda, Dr. Yao, Transplant Nephrologist:  Dr. Bhakta.  Pharmacist: Abhishek Harrell. NP/PAs: Nick Harp  in am rounds and examined with Dr. Castaneda.    Disciplines not in attendance will be notified of the plan.     Interval Events: No acute events overnight. Pt reports this morning that his BMs are now more soft than liquid. Voiding without difficulty. Denies SOB.    Potential Discharge date: pending lab studies.    Education:  Medications    Plan of care:  See Below    MEDICATIONS  (STANDING):  atovaquone Suspension 750 milliGRAM(s) Oral two times a day  cefepime   IVPB 1000 milliGRAM(s) IV Intermittent every 24 hours  dextrose 50% Injectable 25 Gram(s) IV Push once  dextrose 50% Injectable 25 Gram(s) IV Push once  diphenoxylate/atropine 2 Tablet(s) Oral three times a day  gabapentin 100 milliGRAM(s) Oral daily  ganciclovir IVPB 60 milliGRAM(s) IV Intermittent daily  heparin  Injectable 5000 Unit(s) SubCutaneous every 12 hours  insulin lispro (HumaLOG) corrective regimen sliding scale   SubCutaneous three times a day before meals  insulin lispro (HumaLOG) corrective regimen sliding scale   SubCutaneous at bedtime  metoprolol tartrate 50 milliGRAM(s) Oral daily  NIFEdipine XL 30 milliGRAM(s) Oral daily  predniSONE   Tablet 5 milliGRAM(s) Oral daily  tacrolimus 2 milliGRAM(s) Oral two times a day  tamsulosin 0.4 milliGRAM(s) Oral at bedtime    MEDICATIONS  (PRN):  chlorhexidine 4% Liquid 1 Application(s) Topical every 12 hours PRN solution  ondansetron Injectable 4 milliGRAM(s) IV Push every 6 hours PRN Nausea      PAST MEDICAL & SURGICAL HISTORY:  Medial meniscus tear: &#x27; 90&#x27;s  Right  Bowel obstruction: &#x27; 2017   surgically treated  Anal fissure: dx: &#x27; 2018   No surgery  Benign prostatic hypertrophy  Hepatitis C: In Donor Kidney: patient received treatment for Hep. C : post treatment: patient  tested Negative for Hep C  Kidney neoplasm: dx: &#x27;  : Left      s/p bilateral Nephrectomy &#x27;   ESRD (end stage renal disease): On Dialysis &#x27;  to 2018  Type 2 diabetes mellitus: dx: &#x27;88  HTN (hypertension)  Kidney transplant recipient: 2018  @ [facility] :  +  Hep C Donor  S/P right knee arthroscopy: &#x27; 90&#x27;s  H/O ileostomy: &#x27; 2017   for 3 months. 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      Vital Signs Last 24 Hrs  T(C): 36.4 (07 May 2019 05:28), Max: 37.1 (06 May 2019 21:11)  T(F): 97.6 (07 May 2019 05:28), Max: 98.7 (06 May 2019 21:11)  HR: 83 (07 May 2019 05:28) (72 - 83)  BP: 157/76 (07 May 2019 05:28) (134/70 - 162/81)  BP(mean): --  RR: 18 (07 May 2019 05:28) (18 - 18)  SpO2: 99% (07 May 2019 05:28) (97% - 99%)    I&O's Summary    06 May 2019 07:01  -  07 May 2019 07:00  --------------------------------------------------------  IN: 1550 mL / OUT: 975 mL / NET: 575 mL                              8.7    3.0   )-----------( 170      ( 07 May 2019 06:12 )             26.0     05-07    131<L>  |  97  |  82<H>  ----------------------------<  163<H>  5.6<H>   |  20<L>  |  6.16<H>    Ca    9.1      07 May 2019 06:10  Phos  4.9       Mg     1.7     -      Tacrolimus (), Serum: 4.3 ng/mL ( @ 08:14)        Culture - Urine (collected 19 @ 13:34)  Source: .Urine  Final Report (19 @ 11:28):    <10,000 CFU/mL Normal Urogenital Rae    Culture - Blood (collected 19 @ 11:30)  Source: .Blood  Preliminary Report (19 @ 12:02):    No growth to date.    Culture - Blood (collected 19 @ 11:30)  Source: .Blood  Preliminary Report (19 @ 12:02):    No growth to date.        Review of systems  Gen: No weight changes, fatigue, fevers/chills, weakness  Skin: No rashes  Head/Eyes/Ears/Mouth: No headache; Normal hearing; Normal vision w/o blurriness; No sinus pain/discomfort, sore throat  Respiratory: No dyspnea, cough, wheezing, hemoptysis  CV: No chest pain, PND, orthopnea  GI: Denies any abd pain, diarrhea, constipation, nausea, vomiting, melena, hematochezia  : No increased frequency, dysuria, hematuria, nocturia  MSK: No joint pain/swelling; no back pain; no edema  Neuro: No dizziness/lightheadedness, weakness, seizures, numbness, tingling  Heme: No easy bruising or bleeding  Endo: No heat/cold intolerance  Psych: No significant nervousness, anxiety, stress, depression  All other systems were reviewed and are negative, except as noted.      PHYSICAL EXAM:  Constitutional: Age appropriate male in NAD  Eyes: Anicteric, PERRLA  Respiratory: CTA B/L, no increased work of breathing  Cardiovascular: RRR  Gastrointestinal: Soft abdomen, nontender, ND  Genitourinary: Voiding spontaneously  Extremities: SCD's in place and working bilaterally  Vascular: Palpable dp pulses bilaterally  Neurological: A&O x3  Musculoskeletal: Moving all extremities  Psychiatric: Responsive Transplant Surgery - Multidisciplinary Rounds  --------------------------------------------------------------  DDRT  Date:  2018       Present:   Patient seen with multidisciplinary team including ( Transplant Surgeon: Dr. Castaneda, Dr. Yao, Transplant Nephrologist:  Dr. Bhakta.  Pharmacist: Abhishek Harrell. NP/PAs: Nick Harp  in am rounds and examined with Dr. Castaneda.    Disciplines not in attendance will be notified of the plan.     Interval Events: No acute events overnight. Pt reports this morning that his BMs are now more soft than liquid. Voiding without difficulty. Denies SOB.  Discussed updates and plan over the phone with the patient's sister in [place].    Potential Discharge date: pending lab studies.    Education:  Medications    Plan of care:  See Below    MEDICATIONS  (STANDING):  atovaquone Suspension 750 milliGRAM(s) Oral two times a day  cefepime   IVPB 1000 milliGRAM(s) IV Intermittent every 24 hours  dextrose 50% Injectable 25 Gram(s) IV Push once  dextrose 50% Injectable 25 Gram(s) IV Push once  diphenoxylate/atropine 2 Tablet(s) Oral three times a day  gabapentin 100 milliGRAM(s) Oral daily  ganciclovir IVPB 60 milliGRAM(s) IV Intermittent daily  heparin  Injectable 5000 Unit(s) SubCutaneous every 12 hours  insulin lispro (HumaLOG) corrective regimen sliding scale   SubCutaneous three times a day before meals  insulin lispro (HumaLOG) corrective regimen sliding scale   SubCutaneous at bedtime  metoprolol tartrate 50 milliGRAM(s) Oral daily  NIFEdipine XL 30 milliGRAM(s) Oral daily  predniSONE   Tablet 5 milliGRAM(s) Oral daily  tacrolimus 2 milliGRAM(s) Oral two times a day  tamsulosin 0.4 milliGRAM(s) Oral at bedtime    MEDICATIONS  (PRN):  chlorhexidine 4% Liquid 1 Application(s) Topical every 12 hours PRN solution  ondansetron Injectable 4 milliGRAM(s) IV Push every 6 hours PRN Nausea      PAST MEDICAL & SURGICAL HISTORY:  Medial meniscus tear: &#x27; 90&#x27;s  Right  Bowel obstruction: &#x27; 2017   surgically treated  Anal fissure: dx: &#x27; 2018   No surgery  Benign prostatic hypertrophy  Hepatitis C: In Donor Kidney: patient received treatment for Hep. C : post treatment: patient  tested Negative for Hep C  Kidney neoplasm: dx: &#x27;  : Left      s/p bilateral Nephrectomy &#x27;   ESRD (end stage renal disease): On Dialysis &#x27;  to 2018  Type 2 diabetes mellitus: dx: &#x27;88  HTN (hypertension)  Kidney transplant recipient: 2018  @ [facility] :  +  Hep C Donor  S/P right knee arthroscopy: &#x27; 90&#x27;s  H/O ileostomy: &#x27; 2017   for 3 months. 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      Vital Signs Last 24 Hrs  T(C): 36.4 (07 May 2019 05:28), Max: 37.1 (06 May 2019 21:11)  T(F): 97.6 (07 May 2019 05:28), Max: 98.7 (06 May 2019 21:11)  HR: 83 (07 May 2019 05:28) (72 - 83)  BP: 157/76 (07 May 2019 05:28) (134/70 - 162/81)  BP(mean): --  RR: 18 (07 May 2019 05:28) (18 - 18)  SpO2: 99% (07 May 2019 05:28) (97% - 99%)    I&O's Summary    06 May 2019 07:01  -  07 May 2019 07:00  --------------------------------------------------------  IN: 1550 mL / OUT: 975 mL / NET: 575 mL                              8.7    3.0   )-----------( 170      ( 07 May 2019 06:12 )             26.0     05-07    131<L>  |  97  |  82<H>  ----------------------------<  163<H>  5.6<H>   |  20<L>  |  6.16<H>    Ca    9.1      07 May 2019 06:10  Phos  4.9       Mg     1.7           Tacrolimus (), Serum: 4.3 ng/mL ( @ 08:14)        Culture - Urine (collected 19 @ 13:34)  Source: .Urine  Final Report (19 @ 11:28):    <10,000 CFU/mL Normal Urogenital Rae    Culture - Blood (collected 19 @ 11:30)  Source: .Blood  Preliminary Report (19 @ 12:02):    No growth to date.    Culture - Blood (collected 19 @ 11:30)  Source: .Blood  Preliminary Report (19 @ 12:02):    No growth to date.        Review of systems  Gen: No weight changes, fatigue, fevers/chills, weakness  Skin: No rashes  Head/Eyes/Ears/Mouth: No headache; Normal hearing; Normal vision w/o blurriness; No sinus pain/discomfort, sore throat  Respiratory: No dyspnea, cough, wheezing, hemoptysis  CV: No chest pain, PND, orthopnea  GI: Denies any abd pain, diarrhea, constipation, nausea, vomiting, melena, hematochezia  : No increased frequency, dysuria, hematuria, nocturia  MSK: No joint pain/swelling; no back pain; no edema  Neuro: No dizziness/lightheadedness, weakness, seizures, numbness, tingling  Heme: No easy bruising or bleeding  Endo: No heat/cold intolerance  Psych: No significant nervousness, anxiety, stress, depression  All other systems were reviewed and are negative, except as noted.      PHYSICAL EXAM:  Constitutional: Age appropriate male in NAD  Eyes: Anicteric, PERRLA  Respiratory: CTA B/L, no increased work of breathing  Cardiovascular: RRR  Gastrointestinal: Soft abdomen, nontender, ND  Genitourinary: Voiding spontaneously  Extremities: SCD's in place and working bilaterally  Vascular: Palpable dp pulses bilaterally  Neurological: A&O x3  Musculoskeletal: Moving all extremities  Psychiatric: Responsive

## 2019-05-07 NOTE — DIETITIAN INITIAL EVALUATION ADULT. - ENERGY NEEDS
Height: 70 inches, Weight: 214 pounds  BMI: 30.7 kg/m2 IBW: 166 pounds (+/-10%), %IBW: 129%  Pertinent Info: 69 y/o Male with a PMH of ESRD 2/2 bilateral nephrectomy from neoplasm, hypertension, T2DM, s/p HCV + DDRT (7/18) c/b DGF, recent admission in (2/2019) with E coli bacteremia 2/2 urosepsis admitted weakness, malaise, poor appetite, decreased UOP and elevated SCR 2/2 severe CHELA. +3 left arm edema, no pressure ulcers noted at this time.

## 2019-05-07 NOTE — PROGRESS NOTE ADULT - SUBJECTIVE AND OBJECTIVE BOX
[facility] DIVISION OF KIDNEY DISEASES AND HYPERTENSION -- FOLLOW UP NOTE  --------------------------------------------------------------------------------  Chief Complaint:  CHELA    24 hour events/subjective:  Pt feeling better.  No further diarrhea.  Urinating well.  Still hyperkalemic.         PAST HISTORY  --------------------------------------------------------------------------------  No significant changes to PMH, PSH, FHx, SHx, unless otherwise noted    ALLERGIES & MEDICATIONS  --------------------------------------------------------------------------------  Allergies    No Known Allergies    Intolerances      Standing Inpatient Medications  atovaquone Suspension 750 milliGRAM(s) Oral two times a day  cefepime   IVPB 1000 milliGRAM(s) IV Intermittent every 24 hours  dextrose 50% Injectable 25 Gram(s) IV Push once  dextrose 50% Injectable 25 Gram(s) IV Push once  diphenoxylate/atropine 2 Tablet(s) Oral three times a day  gabapentin 100 milliGRAM(s) Oral daily  ganciclovir IVPB 60 milliGRAM(s) IV Intermittent daily  heparin  Injectable 5000 Unit(s) SubCutaneous every 12 hours  insulin lispro (HumaLOG) corrective regimen sliding scale   SubCutaneous three times a day before meals  insulin lispro (HumaLOG) corrective regimen sliding scale   SubCutaneous at bedtime  metoprolol tartrate 50 milliGRAM(s) Oral daily  NIFEdipine XL 30 milliGRAM(s) Oral daily  predniSONE   Tablet 5 milliGRAM(s) Oral daily  tacrolimus 2 milliGRAM(s) Oral two times a day  tamsulosin 0.4 milliGRAM(s) Oral at bedtime    PRN Inpatient Medications  chlorhexidine 4% Liquid 1 Application(s) Topical every 12 hours PRN  ondansetron Injectable 4 milliGRAM(s) IV Push every 6 hours PRN      REVIEW OF SYSTEMS  --------------------------------------------------------------------------------  Gen: mild weakness  Skin: No rashes  Head/Eyes/Ears/Mouth: No headache;No sore throat  Respiratory: No dyspnea, cough,   CV: No chest pain, PND, orthopnea  GI: occasional diarrhea  Transplant: No pain  : No increased frequency, dysuria, hematuria, nocturia  MSK: No joint pain/swelling; no back pain; no edema  Neuro: No dizziness/lightheadedness, weakness, seizures, numbness, tingling  Psych: No significant nervousness, anxiety, stress, depression    All other systems were reviewed and are negative, except as noted.    VITALS/PHYSICAL EXAM  --------------------------------------------------------------------------------  T(C): 36.4 (05-07-19 @ 05:28), Max: 37.1 (05-06-19 @ 21:11)  HR: 83 (05-07-19 @ 05:28) (72 - 83)  BP: 157/76 (05-07-19 @ 05:28) (134/70 - 162/81)  RR: 18 (05-07-19 @ 05:28) (18 - 18)  SpO2: 99% (05-07-19 @ 05:28) (97% - 99%)  Wt(kg): --        05-06-19 @ 07:01  -  05-07-19 @ 07:00  --------------------------------------------------------  IN: 1550 mL / OUT: 975 mL / NET: 575 mL      Physical Exam:  	Gen: NAD, well-appearing  	HEENT: PERRL, supple neck, clear oropharynx  	Pulm: CTA B/L  	CV: RRR, S1S2; no rub  	Back: No spinal or CVA tenderness; no sacral edema  	Abd: +BS, soft, nontender/nondistended                      Transplant: No tenderness, swelling  	: No suprapubic tenderness  	UE: Warm, FROM, intact strength; no edema; no asterixis  	LE: Warm, FROM, intact strength; no edema  	Neuro: No focal deficits, intact gait  	Psych: Normal affect and mood  	Skin: Warm, without rashes      LABS/STUDIES  --------------------------------------------------------------------------------              8.7    3.0   >-----------<  170      [05-07-19 @ 06:12]              26.0     131  |  97  |  82  ----------------------------<  163      [05-07-19 @ 06:10]  5.6   |  20  |  6.16        Ca     9.1     [05-07-19 @ 06:10]      Mg     1.7     [05-07-19 @ 06:10]      Phos  4.9     [05-07-19 @ 06:10]        PTT: 26.9       [05-07-19 @ 06:12]      Creatinine Trend:  SCr 6.16 [05-07 @ 06:10]  SCr 6.70 [05-06 @ 06:27]  SCr 6.96 [05-05 @ 17:00]  SCr 6.63 [05-05 @ 06:30]  SCr 6.87 [05-04 @ 16:49]    Tacrolimus (), Serum: 4.3 ng/mL (05-07 @ 08:14)  Tacrolimus (), Serum: 6.3 ng/mL (05-06 @ 07:44)  Tacrolimus (), Serum: 6.4 ng/mL (05-05 @ 09:29)  Tacrolimus (), Serum: 5.9 ng/mL (05-04 @ 08:43)            Urinalysis - [05-01-19 @ 15:31]      Color Yellow / Appearance Turbid / SG 1.020 / pH 6.0      Gluc Negative / Ketone Negative  / Bili Negative / Urobili Negative       Blood Moderate / Protein 300 mg/dL / Leuk Est Large / Nitrite Negative      RBC 25 / WBC >50 / Hyaline 2 / Gran  / Sq Epi  / Non Sq Epi 2 / Bacteria TNTC      HbA1c 6.4      [05-02-19 @ 08:36]    HBsAb <3.0      [05-03-19 @ 22:44]  HBsAg Nonreact      [05-03-19 @ 22:44]  HBcAb Nonreact      [05-03-19 @ 22:44]  HCV 0.07, Nonreact      [05-03-19 @ 22:44]

## 2019-05-07 NOTE — DIETITIAN INITIAL EVALUATION ADULT. - PROBLEM SELECTOR PLAN 4
- Will hold home tacro/MMF for now  - Will obtain Tacro level in AM and plan to restart tomorrow.  - Continue home Prednisone 5qd

## 2019-05-07 NOTE — PROGRESS NOTE ADULT - ASSESSMENT
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     Problem:   s/p DDRT, admitted with CHELA secondary to FK toxicity   - Increased K this AM to 5.6 from 5.9.  No emergent indication for HD today. Will treat with 60mg IV Lasix and give 1000 cc of HNS+75Bicarb @ 100cc/hr  - Strict I&O.  - Immuno:  MMF on hold. Continue FK, Pred 5mg. Will restart home Atovaquone.  - FU daily tac level   - PPX: on IV Gancyclovir for CMV viremia  - Low potassium diet  - Increase ambulation    UTI  - cont cefepime for now, will complete tomorrow.  - Urine culture 4/3: NGTD, obtained on Cefepime. Will treat empirically.    HTN  - Well controlled  - cont Metoprolol 50mg daily and nifedipine xl 30mg daily    CMV Viremia  - cont gancylcovir   - f/u PCR with resistance panel obtained yesterday.

## 2019-05-07 NOTE — DIETITIAN INITIAL EVALUATION ADULT. - PERTINENT LABORATORY DATA
Na 131 [135 - 145], K+ 5.6 [3.5 - 5.3], BUN 82 [7 - 23], Cr 6.16 [0.50 - 1.30],  [70 - 99], Phos 4.9 [2.5 - 4.5], Alk Phos --, AST --, ALT --, Mg 1.7 [1.6 - 2.6], Ca 9.1 [8.4 - 10.5], HbA1c 6.4%; POCT blood glucose (5/6-7) [id number]

## 2019-05-07 NOTE — DIETITIAN INITIAL EVALUATION ADULT. - ORAL INTAKE PTA
Pt reports decrease in appetite since 1 week PTA 2/2 not feeling well. Pt lives with wife at home who does majority of cooking. Pt reports usual meals are well balanced with vegetables, protein and starch/fair

## 2019-05-07 NOTE — DIETITIAN INITIAL EVALUATION ADULT. - ADHERENCE
good/Pt with well controlled T2DM on Januvia at home, HgbA1C is 6.4%. Pt avoids concentrated sweets, monitors carbohydrate portion sizes, checks fingersticks 1 x day with usual BG range 96-110mg/dL. Pt also follows strict food safety guidelines for post kidney transplant status

## 2019-05-07 NOTE — DIETITIAN INITIAL EVALUATION ADULT. - PERTINENT MEDS FT
MEDICATIONS  (STANDING):  atovaquone Suspension 750 milliGRAM(s) Oral two times a day  cefepime   IVPB 1000 milliGRAM(s) IV Intermittent every 24 hours  dextrose 50% Injectable 25 Gram(s) IV Push once  dextrose 50% Injectable 25 Gram(s) IV Push once  diphenoxylate/atropine 2 Tablet(s) Oral three times a day  gabapentin 100 milliGRAM(s) Oral daily  ganciclovir IVPB 60 milliGRAM(s) IV Intermittent daily  heparin  Injectable 5000 Unit(s) SubCutaneous every 12 hours  insulin lispro (HumaLOG) corrective regimen sliding scale   SubCutaneous three times a day before meals  insulin lispro (HumaLOG) corrective regimen sliding scale   SubCutaneous at bedtime  metoprolol tartrate 50 milliGRAM(s) Oral daily  NIFEdipine XL 30 milliGRAM(s) Oral daily  predniSONE   Tablet 5 milliGRAM(s) Oral daily  tacrolimus 2 milliGRAM(s) Oral two times a day  tamsulosin 0.4 milliGRAM(s) Oral at bedtime    MEDICATIONS  (PRN):  chlorhexidine 4% Liquid 1 Application(s) Topical every 12 hours PRN solution  ondansetron Injectable 4 milliGRAM(s) IV Push every 6 hours PRN Nausea

## 2019-05-08 LAB
ANION GAP SERPL CALC-SCNC: 12 MMOL/L — SIGNIFICANT CHANGE UP (ref 5–17)
APTT BLD: 31.1 SEC — SIGNIFICANT CHANGE UP (ref 27.5–36.3)
BUN SERPL-MCNC: 78 MG/DL — HIGH (ref 7–23)
CALCIUM SERPL-MCNC: 9 MG/DL — SIGNIFICANT CHANGE UP (ref 8.4–10.5)
CHLORIDE SERPL-SCNC: 98 MMOL/L — SIGNIFICANT CHANGE UP (ref 96–108)
CO2 SERPL-SCNC: 22 MMOL/L — SIGNIFICANT CHANGE UP (ref 22–31)
CREAT SERPL-MCNC: 5.2 MG/DL — HIGH (ref 0.5–1.3)
GLUCOSE BLDC GLUCOMTR-MCNC: 167 MG/DL — HIGH (ref 70–99)
GLUCOSE BLDC GLUCOMTR-MCNC: 195 MG/DL — HIGH (ref 70–99)
GLUCOSE BLDC GLUCOMTR-MCNC: 220 MG/DL — HIGH (ref 70–99)
GLUCOSE BLDC GLUCOMTR-MCNC: 252 MG/DL — HIGH (ref 70–99)
GLUCOSE SERPL-MCNC: 165 MG/DL — HIGH (ref 70–99)
HCT VFR BLD CALC: 24.4 % — LOW (ref 39–50)
HGB BLD-MCNC: 8.5 G/DL — LOW (ref 13–17)
MAGNESIUM SERPL-MCNC: 1.7 MG/DL — SIGNIFICANT CHANGE UP (ref 1.6–2.6)
MCHC RBC-ENTMCNC: 33 PG — SIGNIFICANT CHANGE UP (ref 27–34)
MCHC RBC-ENTMCNC: 34.7 GM/DL — SIGNIFICANT CHANGE UP (ref 32–36)
MCV RBC AUTO: 95 FL — SIGNIFICANT CHANGE UP (ref 80–100)
PHOSPHATE SERPL-MCNC: 4.5 MG/DL — SIGNIFICANT CHANGE UP (ref 2.5–4.5)
PLATELET # BLD AUTO: 150 K/UL — SIGNIFICANT CHANGE UP (ref 150–400)
POTASSIUM SERPL-MCNC: 5.4 MMOL/L — HIGH (ref 3.5–5.3)
POTASSIUM SERPL-SCNC: 5.4 MMOL/L — HIGH (ref 3.5–5.3)
RBC # BLD: 2.57 M/UL — LOW (ref 4.2–5.8)
RBC # FLD: 14.5 % — SIGNIFICANT CHANGE UP (ref 10.3–14.5)
SODIUM SERPL-SCNC: 132 MMOL/L — LOW (ref 135–145)
TACROLIMUS SERPL-MCNC: 4.7 NG/ML — SIGNIFICANT CHANGE UP
WBC # BLD: 2.7 K/UL — LOW (ref 3.8–10.5)
WBC # FLD AUTO: 2.7 K/UL — LOW (ref 3.8–10.5)

## 2019-05-08 PROCEDURE — 99232 SBSQ HOSP IP/OBS MODERATE 35: CPT

## 2019-05-08 RX ORDER — FUROSEMIDE 40 MG
60 TABLET ORAL ONCE
Qty: 0 | Refills: 0 | Status: COMPLETED | OUTPATIENT
Start: 2019-05-08 | End: 2019-05-08

## 2019-05-08 RX ORDER — SODIUM CHLORIDE 9 MG/ML
1000 INJECTION, SOLUTION INTRAVENOUS
Qty: 0 | Refills: 0 | Status: DISCONTINUED | OUTPATIENT
Start: 2019-05-08 | End: 2019-05-09

## 2019-05-08 RX ORDER — MAGNESIUM SULFATE 500 MG/ML
2 VIAL (ML) INJECTION ONCE
Qty: 0 | Refills: 0 | Status: COMPLETED | OUTPATIENT
Start: 2019-05-08 | End: 2019-05-08

## 2019-05-08 RX ADMIN — Medication 2: at 08:39

## 2019-05-08 RX ADMIN — Medication 2 TABLET(S): at 06:00

## 2019-05-08 RX ADMIN — TACROLIMUS 2 MILLIGRAM(S): 5 CAPSULE ORAL at 06:00

## 2019-05-08 RX ADMIN — Medication 4: at 12:43

## 2019-05-08 RX ADMIN — HEPARIN SODIUM 5000 UNIT(S): 5000 INJECTION INTRAVENOUS; SUBCUTANEOUS at 06:00

## 2019-05-08 RX ADMIN — Medication 2: at 08:27

## 2019-05-08 RX ADMIN — Medication 50 GRAM(S): at 10:26

## 2019-05-08 RX ADMIN — TAMSULOSIN HYDROCHLORIDE 0.4 MILLIGRAM(S): 0.4 CAPSULE ORAL at 21:39

## 2019-05-08 RX ADMIN — SODIUM CHLORIDE 100 MILLILITER(S): 9 INJECTION, SOLUTION INTRAVENOUS at 10:32

## 2019-05-08 RX ADMIN — Medication 30 MILLIGRAM(S): at 06:01

## 2019-05-08 RX ADMIN — GABAPENTIN 100 MILLIGRAM(S): 400 CAPSULE ORAL at 12:44

## 2019-05-08 RX ADMIN — CHLORHEXIDINE GLUCONATE 1 APPLICATION(S): 213 SOLUTION TOPICAL at 08:27

## 2019-05-08 RX ADMIN — Medication 60 MILLIGRAM(S): at 10:26

## 2019-05-08 RX ADMIN — Medication 50 MILLIGRAM(S): at 06:01

## 2019-05-08 RX ADMIN — TACROLIMUS 2 MILLIGRAM(S): 5 CAPSULE ORAL at 18:03

## 2019-05-08 RX ADMIN — Medication 5 MILLIGRAM(S): at 06:01

## 2019-05-08 RX ADMIN — CEFEPIME 100 MILLIGRAM(S): 1 INJECTION, POWDER, FOR SOLUTION INTRAMUSCULAR; INTRAVENOUS at 21:39

## 2019-05-08 RX ADMIN — GANCICLOVIR SODIUM 100 MILLIGRAM(S): 50 INJECTION, POWDER, LYOPHILIZED, FOR SOLUTION INTRAVENTRICULAR at 14:15

## 2019-05-08 RX ADMIN — ONDANSETRON 4 MILLIGRAM(S): 8 TABLET, FILM COATED ORAL at 08:27

## 2019-05-08 RX ADMIN — ATOVAQUONE 1500 MILLIGRAM(S): 750 SUSPENSION ORAL at 12:44

## 2019-05-08 RX ADMIN — HEPARIN SODIUM 5000 UNIT(S): 5000 INJECTION INTRAVENOUS; SUBCUTANEOUS at 17:23

## 2019-05-08 RX ADMIN — Medication 2 TABLET(S): at 14:21

## 2019-05-08 RX ADMIN — Medication 6: at 17:22

## 2019-05-08 NOTE — PROGRESS NOTE ADULT - SUBJECTIVE AND OBJECTIVE BOX
Transplant Surgery - Multidisciplinary Rounds  --------------------------------------------------------------  DDRT  Date:  2018       Present:   Patient seen with multidisciplinary team including ( Transplant Surgeon: Dr. Castaneda, Transplant Nephrologist:  Dr. Bhakta.  Pharmacist. NP/PAs: Nick Harp  in am rounds and examined with Dr. Castaneda.    Disciplines not in attendance will be notified of the plan.     Interval Events: No acute events overnight. Voiding without difficulty. Denies SOB.      Potential Discharge date: pending lab studies.    Education:  Medications    Plan of care:  See Below    MEDICATIONS  (STANDING):  atovaquone Suspension 750 milliGRAM(s) Oral two times a day  cefepime   IVPB 1000 milliGRAM(s) IV Intermittent every 24 hours  dextrose 50% Injectable 25 Gram(s) IV Push once  dextrose 50% Injectable 25 Gram(s) IV Push once  diphenoxylate/atropine 2 Tablet(s) Oral three times a day  gabapentin 100 milliGRAM(s) Oral daily  ganciclovir IVPB 60 milliGRAM(s) IV Intermittent daily  heparin  Injectable 5000 Unit(s) SubCutaneous every 12 hours  insulin lispro (HumaLOG) corrective regimen sliding scale   SubCutaneous three times a day before meals  insulin lispro (HumaLOG) corrective regimen sliding scale   SubCutaneous at bedtime  metoprolol tartrate 50 milliGRAM(s) Oral daily  NIFEdipine XL 30 milliGRAM(s) Oral daily  predniSONE   Tablet 5 milliGRAM(s) Oral daily  tacrolimus 2 milliGRAM(s) Oral two times a day  tamsulosin 0.4 milliGRAM(s) Oral at bedtime    MEDICATIONS  (PRN):  chlorhexidine 4% Liquid 1 Application(s) Topical every 12 hours PRN solution  ondansetron Injectable 4 milliGRAM(s) IV Push every 6 hours PRN Nausea      PAST MEDICAL & SURGICAL HISTORY:  Medial meniscus tear: &#x27; 90&#x27;s  Right  Bowel obstruction: &#x27; 2017   surgically treated  Anal fissure: dx: &#x27; 2018   No surgery  Benign prostatic hypertrophy  Hepatitis C: In Donor Kidney: patient received treatment for Hep. 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      Vital Signs Last 24 Hrs  T(C): 36.8 (08 May 2019 05:37), Max: 37 (07 May 2019 10:39)  T(F): 98.2 (08 May 2019 05:37), Max: 98.6 (07 May 2019 10:39)  HR: 81 (08 May 2019 05:37) (73 - 81)  BP: 151/74 (08 May 2019 05:37) (124/61 - 152/67)  BP(mean): 100 (07 May 2019 10:39) (100 - 100)  RR: 18 (08 May 2019 05:37) (18 - 18)  SpO2: 98% (08 May 2019 05:37) (97% - 98%)      07 May 2019 07:01  -  08 May 2019 07:00  --------------------------------------------------------  IN:    Oral Fluid: 960 mL    sodium chloride 0.45%: 1200 mL    Solution: 50 mL  Total IN: 2210 mL    OUT:    Voided: 2350 mL  Total OUT: 2350 mL    Total NET: -140 mL        LABS:      CBC Full  -  ( 08 May 2019 06:04 )  WBC Count : 2.7 K/uL  RBC Count : 2.57 M/uL  Hemoglobin : 8.5 g/dL  Hematocrit : 24.4 %  Platelet Count - Automated : 150 K/uL  Mean Cell Volume : 95.0 fl  Mean Cell Hemoglobin : 33.0 pg  Mean Cell Hemoglobin Concentration : 34.7 gm/dL  Auto Neutrophil # : x  Auto Lymphocyte # : x  Auto Monocyte # : x  Auto Eosinophil # : x  Auto Basophil # : x  Auto Neutrophil % : x  Auto Lymphocyte % : x  Auto Monocyte % : x  Auto Eosinophil % : x  Auto Basophil % : x    05    132<L>  |  98  |  78<H>  ----------------------------<  165<H>  5.4<H>   |  22  |  5.20<H>    Ca    9.0      08 May 2019 06:02  Phos  4.5       Mg     1.7           PTT - ( 08 May 2019 06:04 )  PTT:31.1 sec        Tacrolimus (), Serum: 4.3 ng/mL ( @ 08:14)        Culture - Urine (collected 19 @ 13:34)  Source: .Urine  Final Report (19 @ 11:28):    <10,000 CFU/mL Normal Urogenital Rae    Culture - Blood (collected 19 @ 11:30)  Source: .Blood  Preliminary Report (19 @ 12:02):    No growth to date.    Culture - Blood (collected 19 @ 11:30)  Source: .Blood  Preliminary Report (19 @ 12:02):    No growth to date.        Review of systems  Gen: No weight changes, fatigue, fevers/chills, weakness  Skin: No rashes  Head/Eyes/Ears/Mouth: No headache; Normal hearing; Normal vision w/o blurriness; No sinus pain/discomfort, sore throat  Respiratory: No dyspnea, cough, wheezing, hemoptysis  CV: No chest pain, PND, orthopnea  GI: Denies any abd pain, diarrhea, constipation, nausea, vomiting, melena, hematochezia  : No increased frequency, dysuria, hematuria, nocturia  MSK: No joint pain/swelling; no back pain; no edema  Neuro: No dizziness/lightheadedness, weakness, seizures, numbness, tingling  Heme: No easy bruising or bleeding  Endo: No heat/cold intolerance  Psych: No significant nervousness, anxiety, stress, depression  All other systems were reviewed and are negative, except as noted.      PHYSICAL EXAM:  Constitutional: Age appropriate male in NAD  Eyes: Anicteric, PERRLA  Respiratory: CTA B/L, no increased work of breathing  Cardiovascular: RRR  Gastrointestinal: Soft abdomen, nontender, ND  Genitourinary: Voiding spontaneously  Extremities: SCD's in place and working bilaterally  Vascular: Palpable dp pulses bilaterally  Neurological: A&O x3  Musculoskeletal: Moving all extremities  Psychiatric: Responsive

## 2019-05-08 NOTE — PROGRESS NOTE ADULT - ASSESSMENT
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    1.  Renal transplant - CHELA possibly from pyelonephritis, and tacrolimus toxicity.  Slowly improving, no indication for dialysis.  No hydro on sonogram.      2.  CHELA - as above.  Hyperkalemia improving - will repeat 1L of IVF and 80mgs IV lasix.  Should improve as renal function improves.  SQ heparin may also be contributing.    3.  Immunosuppression - Back on tacro and pred 5.  Hold MMF as pt leukopenic and with severe infections - UTI and CMV,  Target tacro 5-7.   4.  UTI/ pyelonephritis - Cont cefepime for at least 7 days - last dose tomorrow.  Urine culture negative but sent after a day of cefepime.    5.  Hyponatremia - improving/ stable.  Will likely resolve with improvement of renal function.    6.  HCV - genotype 1a s/p epclusa treatment with cure.  7.  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  Check resistance panel and repeat pcr - was sent.  Cont IV ganciclovir.  May need to increase to 10mgs/kg.    8.  DM2 - Hold for now and cover with sliding scale.  9.  Oxalaturia - hold calcium and bicitra for now.  Will resume at discharge.   10.  HTN - controlled on metoprolol.

## 2019-05-08 NOTE — PROGRESS NOTE ADULT - SUBJECTIVE AND OBJECTIVE BOX
[facility] DIVISION OF KIDNEY DISEASES AND HYPERTENSION -- FOLLOW UP NOTE  --------------------------------------------------------------------------------  Chief Complaint:  CHELA    24 hour events/subjective:  No diarrhea or dysuria.  Still has CHELA and on IV ganciclovir.       PAST HISTORY  --------------------------------------------------------------------------------  No significant changes to PMH, PSH, FHx, SHx, unless otherwise noted    ALLERGIES & MEDICATIONS  --------------------------------------------------------------------------------  Allergies    No Known Allergies    Intolerances      Standing Inpatient Medications  atovaquone Suspension 1500 milliGRAM(s) Oral daily  cefepime   IVPB 1000 milliGRAM(s) IV Intermittent every 24 hours  dextrose 50% Injectable 25 Gram(s) IV Push once  dextrose 50% Injectable 25 Gram(s) IV Push once  diphenoxylate/atropine 2 Tablet(s) Oral three times a day  gabapentin 100 milliGRAM(s) Oral daily  ganciclovir IVPB 60 milliGRAM(s) IV Intermittent daily  heparin  Injectable 5000 Unit(s) SubCutaneous every 12 hours  insulin lispro (HumaLOG) corrective regimen sliding scale   SubCutaneous three times a day before meals  insulin lispro (HumaLOG) corrective regimen sliding scale   SubCutaneous at bedtime  metoprolol tartrate 50 milliGRAM(s) Oral daily  NIFEdipine XL 30 milliGRAM(s) Oral daily  predniSONE   Tablet 5 milliGRAM(s) Oral daily  sodium chloride 0.45% 1000 milliLiter(s) IV Continuous <Continuous>  tacrolimus 2 milliGRAM(s) Oral two times a day  tamsulosin 0.4 milliGRAM(s) Oral at bedtime    PRN Inpatient Medications  chlorhexidine 4% Liquid 1 Application(s) Topical every 12 hours PRN  ondansetron Injectable 4 milliGRAM(s) IV Push every 6 hours PRN      REVIEW OF SYSTEMS  --------------------------------------------------------------------------------  Gen: No fatigue, fevers/chills, weakness  Skin: No rashes  Head/Eyes/Ears/Mouth: No headache;No sore throat  Respiratory: No dyspnea, cough,   CV: No chest pain, PND, orthopnea  GI: No abdominal pain, diarrhea, constipation, nausea, vomiting  Transplant: No pain  : No increased frequency, dysuria, hematuria, nocturia  MSK: No joint pain/swelling; no back pain; no edema  Neuro: No dizziness/lightheadedness, weakness, seizures, numbness, tingling  Psych: No significant nervousness, anxiety, stress, depression    All other systems were reviewed and are negative, except as noted.    VITALS/PHYSICAL EXAM  --------------------------------------------------------------------------------  T(C): 36.8 (05-08-19 @ 14:25), Max: 36.8 (05-07-19 @ 18:03)  HR: 69 (05-08-19 @ 14:25) (69 - 81)  BP: 122/61 (05-08-19 @ 14:25) (122/61 - 151/76)  RR: 18 (05-08-19 @ 14:25) (18 - 18)  SpO2: 97% (05-08-19 @ 14:25) (97% - 98%)  Wt(kg): --        05-07-19 @ 07:01  -  05-08-19 @ 07:00  --------------------------------------------------------  IN: 2210 mL / OUT: 2350 mL / NET: -140 mL    05-08-19 @ 07:01  -  05-08-19 @ 15:44  --------------------------------------------------------  IN: 480 mL / OUT: 325 mL / NET: 155 mL      Physical Exam:  	Gen: NAD, well-appearing  	HEENT: PERRL, supple neck, clear oropharynx  	Pulm: CTA B/L  	CV: RRR, S1S2; no rub  	Back: No spinal or CVA tenderness; no sacral edema  	Abd: +BS, soft, nontender/nondistended                      Transplant: No tenderness, swelling  	: No suprapubic tenderness  	UE: Warm, FROM, intact strength; no edema; no asterixis  	LE: Warm, FROM, intact strength; no edema  	Neuro: No focal deficits, intact gait  	Psych: Normal affect and mood  	Skin: Warm, without rashes      LABS/STUDIES  --------------------------------------------------------------------------------              8.5    2.7   >-----------<  150      [05-08-19 @ 06:04]              24.4     132  |  98  |  78  ----------------------------<  165      [05-08-19 @ 06:02]  5.4   |  22  |  5.20        Ca     9.0     [05-08-19 @ 06:02]      Mg     1.7     [05-08-19 @ 06:02]      Phos  4.5     [05-08-19 @ 06:02]        PTT: 31.1       [05-08-19 @ 06:04]      Creatinine Trend:  SCr 5.20 [05-08 @ 06:02]  SCr 6.16 [05-07 @ 06:10]  SCr 6.70 [05-06 @ 06:27]  SCr 6.96 [05-05 @ 17:00]  SCr 6.63 [05-05 @ 06:30]    Tacrolimus (), Serum: 4.7 ng/mL (05-08 @ 08:37)  Tacrolimus (), Serum: 4.3 ng/mL (05-07 @ 08:14)  Tacrolimus (), Serum: 6.3 ng/mL (05-06 @ 07:44)  Tacrolimus (), Serum: 6.4 ng/mL (05-05 @ 09:29)            Urinalysis - [05-01-19 @ 15:31]      Color Yellow / Appearance Turbid / SG 1.020 / pH 6.0      Gluc Negative / Ketone Negative  / Bili Negative / Urobili Negative       Blood Moderate / Protein 300 mg/dL / Leuk Est Large / Nitrite Negative      RBC 25 / WBC >50 / Hyaline 2 / Gran  / Sq Epi  / Non Sq Epi 2 / Bacteria TNTC      HbA1c 6.4      [05-02-19 @ 08:36]    HBsAb <3.0      [05-03-19 @ 22:44]  HBsAg Nonreact      [05-03-19 @ 22:44]  HBcAb Nonreact      [05-03-19 @ 22:44]  HCV 0.07, Nonreact      [05-03-19 @ 22:44]

## 2019-05-08 NOTE — PROGRESS NOTE ADULT - ASSESSMENT
Assessment and Plan:   68M with a PMH of ESRD 2/2 bilateral nephrectomy from neoplasm, hypertension, and NIDDM status post HCV + DDRT (7/17), course complicated by DGF, mild rejection/ATN, and KEREN requiring stenting (removed 2/14/19); recently admitted (2/19) with E coli urosepsis and discharged on Cipro now admitted with severe CHELA, FK toxicity (level 39), UTI and CMV viremia.     Problem:   s/p DDRT, admitted with CHELA secondary to FK toxicity   - K improving from 5.6 to 5.4 No emergent indication for HD today. Will treat with 60mg IV Lasix and give 1000 cc of HNS+75Bicarb @ 100cc/hr  - Strict I&O.  - Immuno:  MMF on hold. Continue FK, Pred 5mg. Continue home Atovaquone.  - F/U daily tac level   - PPX: on IV Gancyclovir for CMV viremia  - Low potassium diet  - Increase ambulation    UTI  - last day of cefepime today.   - Urine culture 4/3: NGTD, obtained on Cefepime.     HTN  - Well controlled  - cont Metoprolol 50mg daily and nifedipine xl 30mg daily    CMV Viremia  - cont gancylcovir   - f/u PCR with resistance panel obtained 2 days ago.

## 2019-05-09 ENCOUNTER — TRANSCRIPTION ENCOUNTER (OUTPATIENT)
Age: 69
End: 2019-05-09

## 2019-05-09 VITALS
RESPIRATION RATE: 18 BRPM | OXYGEN SATURATION: 98 % | SYSTOLIC BLOOD PRESSURE: 147 MMHG | HEART RATE: 80 BPM | DIASTOLIC BLOOD PRESSURE: 66 MMHG | TEMPERATURE: 98 F

## 2019-05-09 DIAGNOSIS — E87.5 HYPERKALEMIA: ICD-10-CM

## 2019-05-09 DIAGNOSIS — B25.9 CYTOMEGALOVIRAL DISEASE, UNSPECIFIED: ICD-10-CM

## 2019-05-09 LAB
ANION GAP SERPL CALC-SCNC: 12 MMOL/L — SIGNIFICANT CHANGE UP (ref 5–17)
APTT BLD: 31.9 SEC — SIGNIFICANT CHANGE UP (ref 27.5–36.3)
BUN SERPL-MCNC: 71 MG/DL — HIGH (ref 7–23)
CALCIUM SERPL-MCNC: 8.8 MG/DL — SIGNIFICANT CHANGE UP (ref 8.4–10.5)
CHLORIDE SERPL-SCNC: 98 MMOL/L — SIGNIFICANT CHANGE UP (ref 96–108)
CMV DNA CSF QL NAA+PROBE: SIGNIFICANT CHANGE UP
CMV DNA SPEC NAA+PROBE-LOG#: 4.05 LOGIU/ML — HIGH
CO2 SERPL-SCNC: 22 MMOL/L — SIGNIFICANT CHANGE UP (ref 22–31)
CREAT SERPL-MCNC: 4.94 MG/DL — HIGH (ref 0.5–1.3)
GLUCOSE BLDC GLUCOMTR-MCNC: 196 MG/DL — HIGH (ref 70–99)
GLUCOSE BLDC GLUCOMTR-MCNC: 212 MG/DL — HIGH (ref 70–99)
GLUCOSE SERPL-MCNC: 176 MG/DL — HIGH (ref 70–99)
HCT VFR BLD CALC: 25.6 % — LOW (ref 39–50)
HGB BLD-MCNC: 8.5 G/DL — LOW (ref 13–17)
MAGNESIUM SERPL-MCNC: 1.7 MG/DL — SIGNIFICANT CHANGE UP (ref 1.6–2.6)
MCHC RBC-ENTMCNC: 31.3 PG — SIGNIFICANT CHANGE UP (ref 27–34)
MCHC RBC-ENTMCNC: 33.2 GM/DL — SIGNIFICANT CHANGE UP (ref 32–36)
MCV RBC AUTO: 94.3 FL — SIGNIFICANT CHANGE UP (ref 80–100)
PHOSPHATE SERPL-MCNC: 3.7 MG/DL — SIGNIFICANT CHANGE UP (ref 2.5–4.5)
PLATELET # BLD AUTO: 163 K/UL — SIGNIFICANT CHANGE UP (ref 150–400)
POTASSIUM SERPL-MCNC: 5.2 MMOL/L — SIGNIFICANT CHANGE UP (ref 3.5–5.3)
POTASSIUM SERPL-SCNC: 5.2 MMOL/L — SIGNIFICANT CHANGE UP (ref 3.5–5.3)
RBC # BLD: 2.71 M/UL — LOW (ref 4.2–5.8)
RBC # FLD: 14.6 % — HIGH (ref 10.3–14.5)
SODIUM SERPL-SCNC: 132 MMOL/L — LOW (ref 135–145)
TACROLIMUS SERPL-MCNC: 4.3 NG/ML — SIGNIFICANT CHANGE UP
WBC # BLD: 3.3 K/UL — LOW (ref 3.8–10.5)
WBC # FLD AUTO: 3.3 K/UL — LOW (ref 3.8–10.5)

## 2019-05-09 PROCEDURE — 51702 INSERT TEMP BLADDER CATH: CPT

## 2019-05-09 PROCEDURE — 84295 ASSAY OF SERUM SODIUM: CPT

## 2019-05-09 PROCEDURE — 82330 ASSAY OF CALCIUM: CPT

## 2019-05-09 PROCEDURE — 83036 HEMOGLOBIN GLYCOSYLATED A1C: CPT

## 2019-05-09 PROCEDURE — 86705 HEP B CORE ANTIBODY IGM: CPT

## 2019-05-09 PROCEDURE — 85730 THROMBOPLASTIN TIME PARTIAL: CPT

## 2019-05-09 PROCEDURE — 85014 HEMATOCRIT: CPT

## 2019-05-09 PROCEDURE — 80053 COMPREHEN METABOLIC PANEL: CPT

## 2019-05-09 PROCEDURE — 86803 HEPATITIS C AB TEST: CPT

## 2019-05-09 PROCEDURE — 84100 ASSAY OF PHOSPHORUS: CPT

## 2019-05-09 PROCEDURE — 84132 ASSAY OF SERUM POTASSIUM: CPT

## 2019-05-09 PROCEDURE — 94640 AIRWAY INHALATION TREATMENT: CPT

## 2019-05-09 PROCEDURE — 82435 ASSAY OF BLOOD CHLORIDE: CPT

## 2019-05-09 PROCEDURE — 71045 X-RAY EXAM CHEST 1 VIEW: CPT

## 2019-05-09 PROCEDURE — 99285 EMERGENCY DEPT VISIT HI MDM: CPT | Mod: 25

## 2019-05-09 PROCEDURE — 99232 SBSQ HOSP IP/OBS MODERATE 35: CPT

## 2019-05-09 PROCEDURE — 87040 BLOOD CULTURE FOR BACTERIA: CPT

## 2019-05-09 PROCEDURE — 80197 ASSAY OF TACROLIMUS: CPT

## 2019-05-09 PROCEDURE — 86704 HEP B CORE ANTIBODY TOTAL: CPT

## 2019-05-09 PROCEDURE — 82947 ASSAY GLUCOSE BLOOD QUANT: CPT

## 2019-05-09 PROCEDURE — 99261: CPT

## 2019-05-09 PROCEDURE — 83735 ASSAY OF MAGNESIUM: CPT

## 2019-05-09 PROCEDURE — 82803 BLOOD GASES ANY COMBINATION: CPT

## 2019-05-09 PROCEDURE — 83605 ASSAY OF LACTIC ACID: CPT

## 2019-05-09 PROCEDURE — 81001 URINALYSIS AUTO W/SCOPE: CPT

## 2019-05-09 PROCEDURE — 82962 GLUCOSE BLOOD TEST: CPT

## 2019-05-09 PROCEDURE — 85027 COMPLETE CBC AUTOMATED: CPT

## 2019-05-09 PROCEDURE — 76776 US EXAM K TRANSPL W/DOPPLER: CPT

## 2019-05-09 PROCEDURE — 87340 HEPATITIS B SURFACE AG IA: CPT

## 2019-05-09 PROCEDURE — 85610 PROTHROMBIN TIME: CPT

## 2019-05-09 PROCEDURE — 87086 URINE CULTURE/COLONY COUNT: CPT

## 2019-05-09 PROCEDURE — 86706 HEP B SURFACE ANTIBODY: CPT

## 2019-05-09 PROCEDURE — 80180 DRUG SCRN QUAN MYCOPHENOLATE: CPT

## 2019-05-09 PROCEDURE — 93005 ELECTROCARDIOGRAM TRACING: CPT | Mod: XU

## 2019-05-09 PROCEDURE — 80048 BASIC METABOLIC PNL TOTAL CA: CPT

## 2019-05-09 RX ORDER — NIFEDIPINE 30 MG
1 TABLET, EXTENDED RELEASE 24 HR ORAL
Qty: 0 | Refills: 0 | DISCHARGE

## 2019-05-09 RX ORDER — TACROLIMUS 5 MG/1
3 CAPSULE ORAL
Qty: 0 | Refills: 0 | DISCHARGE
Start: 2019-05-09

## 2019-05-09 RX ORDER — NIFEDIPINE 30 MG
1 TABLET, EXTENDED RELEASE 24 HR ORAL
Qty: 30 | Refills: 0
Start: 2019-05-09

## 2019-05-09 RX ORDER — MYCOPHENOLATE MOFETIL 250 MG/1
1 CAPSULE ORAL
Qty: 0 | Refills: 0 | DISCHARGE

## 2019-05-09 RX ORDER — DIPHENOXYLATE HCL/ATROPINE 2.5-.025MG
2 TABLET ORAL
Qty: 0 | Refills: 0 | DISCHARGE

## 2019-05-09 RX ORDER — VALGANCICLOVIR 450 MG/1
1 TABLET, FILM COATED ORAL
Qty: 30 | Refills: 0
Start: 2019-05-09 | End: 2019-06-07

## 2019-05-09 RX ORDER — TACROLIMUS 5 MG/1
3 CAPSULE ORAL
Refills: 0 | Status: DISCONTINUED | OUTPATIENT
Start: 2019-05-09 | End: 2019-05-09

## 2019-05-09 RX ORDER — MAGNESIUM OXIDE 400 MG ORAL TABLET 241.3 MG
1 TABLET ORAL
Qty: 0 | Refills: 0 | DISCHARGE

## 2019-05-09 RX ORDER — TACROLIMUS 5 MG/1
2 CAPSULE ORAL
Qty: 0 | Refills: 0 | DISCHARGE

## 2019-05-09 RX ORDER — ALLOPURINOL 300 MG
1 TABLET ORAL
Qty: 30 | Refills: 0
Start: 2019-05-09 | End: 2019-06-07

## 2019-05-09 RX ORDER — METOPROLOL TARTRATE 50 MG
1 TABLET ORAL
Qty: 0 | Refills: 0 | DISCHARGE

## 2019-05-09 RX ORDER — SITAGLIPTIN 50 MG/1
1 TABLET, FILM COATED ORAL
Qty: 0 | Refills: 0 | DISCHARGE

## 2019-05-09 RX ORDER — METOPROLOL TARTRATE 50 MG
1 TABLET ORAL
Qty: 30 | Refills: 0
Start: 2019-05-09 | End: 2019-06-07

## 2019-05-09 RX ORDER — VALGANCICLOVIR 450 MG/1
2 TABLET, FILM COATED ORAL
Qty: 0 | Refills: 0 | DISCHARGE

## 2019-05-09 RX ORDER — TACROLIMUS 5 MG/1
4 CAPSULE ORAL
Qty: 0 | Refills: 0 | DISCHARGE

## 2019-05-09 RX ADMIN — ATOVAQUONE 1500 MILLIGRAM(S): 750 SUSPENSION ORAL at 12:49

## 2019-05-09 RX ADMIN — TACROLIMUS 2 MILLIGRAM(S): 5 CAPSULE ORAL at 06:20

## 2019-05-09 RX ADMIN — GANCICLOVIR SODIUM 100 MILLIGRAM(S): 50 INJECTION, POWDER, LYOPHILIZED, FOR SOLUTION INTRAVENTRICULAR at 13:42

## 2019-05-09 RX ADMIN — HEPARIN SODIUM 5000 UNIT(S): 5000 INJECTION INTRAVENOUS; SUBCUTANEOUS at 05:36

## 2019-05-09 RX ADMIN — Medication 5 MILLIGRAM(S): at 05:36

## 2019-05-09 RX ADMIN — Medication 4: at 12:49

## 2019-05-09 RX ADMIN — GABAPENTIN 100 MILLIGRAM(S): 400 CAPSULE ORAL at 12:49

## 2019-05-09 RX ADMIN — Medication 30 MILLIGRAM(S): at 05:36

## 2019-05-09 RX ADMIN — Medication 50 MILLIGRAM(S): at 05:36

## 2019-05-09 RX ADMIN — Medication 2: at 08:45

## 2019-05-09 NOTE — PROGRESS NOTE ADULT - SUBJECTIVE AND OBJECTIVE BOX
DDRT  Date:  2018      Present:   Patient seen with multidisciplinary team including ( Transplant Surgeon: Dr. Castaneda, Dr. Heath, Transplant Nephrologist:  Dr. Bhakta.  Pharmacist Giselle NP/PAs: Delma Santa in am rounds and examined with Dr. Heath.    Disciplines not in attendance will be notified of the plan.     68M with a PMH of ESRD 2/2 bilateral nephrectomy from neoplasm, hypertension, and NIDDM status post HCV + DDRT () c/b DGF, ATN/mild rejection (2017 treated with steroids) and perinephric collection requiring drainage. 19 ureteral stent removal; recent admission in (2019) with E coli bacteremia 2/2 urosepsis treated w/ IV vanc/cefepime/zosyn and DC home on Cipro. P/W weakness, malaise, poor appetite, dec UOP and elevated SCR 9.8 from baseline 1.2-1.4.  Of note, he has chronic diarrhea and was recently treated for another UTI with PO Bactrim of which he last took one week prior.  Also, started on Valcyte  for +CMV.   donor 38y/o male, HCV+, 2 arteries/1vein, CIT 23'  Bx: few oxalate crystals    Interval Events: No acute events overnight. Voiding without difficulty. Afebrile, stable vitals. Cr continues to downtrend 4.94<5.20. UOP 1.3L. K normal at 5.2. CMV PCR downtrending 26794<[id number] (log  4.05<4.35). Dced ganciclovir, started valcyte 450 daily. Cefepime was dced yesterday.     Potential Discharge date: 19    Education:  Medications    Plan of care:  See Below    MEDICATIONS  (STANDING):  atovaquone Suspension 1500 milliGRAM(s) Oral daily  dextrose 50% Injectable 25 Gram(s) IV Push once  dextrose 50% Injectable 25 Gram(s) IV Push once  gabapentin 100 milliGRAM(s) Oral daily  ganciclovir IVPB 60 milliGRAM(s) IV Intermittent daily  heparin  Injectable 5000 Unit(s) SubCutaneous every 12 hours  insulin lispro (HumaLOG) corrective regimen sliding scale   SubCutaneous three times a day before meals  insulin lispro (HumaLOG) corrective regimen sliding scale   SubCutaneous at bedtime  metoprolol tartrate 50 milliGRAM(s) Oral daily  NIFEdipine XL 30 milliGRAM(s) Oral daily  predniSONE   Tablet 5 milliGRAM(s) Oral daily  tacrolimus 2 milliGRAM(s) Oral two times a day  tamsulosin 0.4 milliGRAM(s) Oral at bedtime    MEDICATIONS  (PRN):  chlorhexidine 4% Liquid 1 Application(s) Topical every 12 hours PRN solution  ondansetron Injectable 4 milliGRAM(s) IV Push every 6 hours PRN Nausea      PAST MEDICAL & SURGICAL HISTORY:  Medial meniscus tear: &#x27; 90&#x27;s  Right  Bowel obstruction: &#x27; 2017   surgically treated  Anal fissure: dx: &#x27; 2018   No surgery  Benign prostatic hypertrophy  Hepatitis C: In Donor Kidney: patient received treatment for Hep. C : post treatment: patient  tested Negative for Hep C  Kidney neoplasm: dx: &#x27;  : Left      s/p bilateral Nephrectomy &#x27; 2015  ESRD (end stage renal disease): On Dialysis &#x27;  to 2018  Type 2 diabetes mellitus: dx: &#x27;88  HTN (hypertension)  Kidney transplant recipient: 2018  @ [facility] :  +  Hep C Donor  S/P right knee arthroscopy: &#x27; 90&#x27;s  H/O ileostomy: &#x27; 2017   for 3 months. 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      Vital Signs Last 24 Hrs  T(C): 37.1 (09 May 2019 05:14), Max: 37.1 (09 May 2019 01:13)  T(F): 98.7 (09 May 2019 05:14), Max: 98.7 (09 May 2019 01:13)  HR: 87 (09 May 2019 05:14) (69 - 87)  BP: 139/81 (09 May 2019 05:14) (122/61 - 165/76)  BP(mean): --  RR: 18 (09 May 2019 05:14) (18 - 18)  SpO2: 97% (09 May 2019 05:14) (97% - 99%)    I&O's Summary    08 May 2019 07:  -  09 May 2019 07:00  --------------------------------------------------------  IN: 1080 mL / OUT: 1375 mL / NET: -295 mL    09 May 2019 07:  -  09 May 2019 09:57  --------------------------------------------------------  IN: 0 mL / OUT: 400 mL / NET: -400 mL                       8.5    3.3   )-----------( 163      ( 09 May 2019 06:18 )             25.6     -    132<L>  |  98  |  71<H>  ----------------------------<  176<H>  5.2   |  22  |  4.94<H>    Ca    8.8      09 May 2019 06:17  Phos  3.7       Mg     1.7           Tacrolimus (), Serum: 4.3 ng/mL ( @ 07:24)    Review of systems  Gen: No weight changes, fatigue, fevers/chills, weakness  Skin: No rashes  Head/Eyes/Ears/Mouth: No headache; Normal hearing; Normal vision w/o blurriness; No sinus pain/discomfort, sore throat  Respiratory: No dyspnea, cough, wheezing, hemoptysis  CV: No chest pain, PND, orthopnea  GI: Denies any abd pain, diarrhea, constipation, nausea, vomiting, melena, hematochezia  : No increased frequency, dysuria, hematuria, nocturia  MSK: No joint pain/swelling; no back pain; no edema  Neuro: No dizziness/lightheadedness, weakness, seizures, numbness, tingling  Heme: No easy bruising or bleeding  Endo: No heat/cold intolerance  Psych: No significant nervousness, anxiety, stress, depression  All other systems were reviewed and are negative, except as noted.      PHYSICAL EXAM:  Constitutional: Age appropriate male in NAD  Eyes: Anicteric, PERRLA  Respiratory: CTA B/L, no increased work of breathing  Cardiovascular: RRR  Gastrointestinal: Soft abdomen, nontender, ND  Genitourinary: Voiding spontaneously  Extremities: SCD's in place and working bilaterally  Vascular: Palpable dp pulses bilaterally  Neurological: A&O x3  Musculoskeletal: Moving all extremities  Psychiatric: Responsive

## 2019-05-09 NOTE — DISCHARGE NOTE PROVIDER - NSDCACTIVITY_GEN_ALL_CORE
No heavy lifting/straining/Walking - Outdoors allowed/Walking - Indoors allowed/Bathing allowed Showering allowed/Walking - Outdoors allowed/Walking - Indoors allowed/No heavy lifting/straining

## 2019-05-09 NOTE — DISCHARGE NOTE PROVIDER - HOSPITAL COURSE
68M with a PMH of ESRD 2/2 bilateral nephrectomy from neoplasm, hypertension, and NIDDM status post HCV + DDRT (7/18) c/b DGF, ATN/mild rejection (8/2017 treated with steroids) and perinephric collection requiring drainage. 2/19/19 ureteral stent removal; recent admission in (2/2019) with E coli bacteremia 2/2 urosepsis treated w/ IV vanc/cefepime/zosyn and was discharged home on Cipro. He was started on Valcyte 4/19 as outpatient for +CMV. He is now admitted w/ severe CHELA 2/2 FK toxicity (level on admission was 39), Cr 11, K 6.2,  and +UA. He was also febrile to 101 on 5/22. Renal US was negative for hydronephrosis. Tacro was held. Valcyte was switched to ganciclovir, armenta was placed and patient was started on cefepime for  +UA.  Throughout the admission, patient was given insulin/D50/lasix/kayexylate for hyperkalemia multiple times; required HD once on 5/3 as patient became anuric.          UOP improved w/ lasix. Armenta was discontinued on 5/4, patient voiding freely since and has good UOP. Cefepime discontinued on 5/8 as urine cx had no growth; patient was treated emperically from 5/1-5/8 for +UA.  Tacrolimus was restarted once level normalized. Patient is being discharged on tacro 2/2 (level today is 4.3). CMV PCR from 5/7 is downtrending [id number] (log 4.05<4.35). Patient is being discharged on valcyte 450 daily. 68M with a PMH of ESRD 2/2 bilateral nephrectomy from neoplasm, hypertension, and NIDDM status post HCV + DDRT (7/18) c/b DGF, ATN/mild rejection (8/2017 treated with steroids) and perinephric collection requiring drainage. 2/19/19 ureteral stent removal; recent admission in (2/2019) with E coli bacteremia 2/2 urosepsis treated w/ IV vanc/cefepime/zosyn and was discharged home on Cipro. He was started on Valcyte 4/19 as outpatient for +CMV. He is now admitted w/ severe CHELA 2/2 FK toxicity (level on admission was 39), Cr 11, K 6.2,  and +UA. He was also febrile to 101 on 5/22. Renal US was negative for hydronephrosis. Tacro was held. Valcyte was switched to ganciclovir, armenta was placed and patient was started on cefepime for  +UA.  Throughout the admission, patient was given insulin/D50/lasix/kayexylate for hyperkalemia multiple times; required HD once on 5/3 as patient became anuric.          UOP improved w/ lasix. Armenta was discontinued on 5/4, patient voiding freely since and has good UOP. Cefepime discontinued on 5/8 as urine cx had no growth; patient was treated emperically from 5/1-5/8 for +UA.  Tacrolimus was restarted once level normalized. Patient is being discharged on tacro 2/2 (level today is 4.3).  MMF was held due to infection, will be restarted as outpatient. CMV PCR from 5/7 is downtrending [id number] (log 4.05<4.35). Patient is being discharged on valcyte 450 daily.         Patient is voiding freely, having BMs, ambulating, tolerating a diet, Cr downtrending, K normalized, Tac level normalized. He was evaluated by the multi-disciplinary team including surgeon, nephrologist, NP, pharmacist, nutrition, social work, and nursing and deemed stable for discharge to home with close outpatient follow-up. 68M with a PMH of ESRD 2/2 bilateral nephrectomy from neoplasm, hypertension, and NIDDM status post HCV + DDRT (7/18) c/b DGF, ATN/mild rejection (8/2017 treated with steroids) and perinephric collection requiring drainage. 2/19/19 ureteral stent removal; recent admission in (2/2019) with E coli bacteremia 2/2 urosepsis treated w/ IV vanc/cefepime/zosyn and was discharged home on Cipro. He was started on Valcyte 4/19 as outpatient for +CMV. He is now admitted w/ severe CHELA 2/2 FK toxicity (level on admission was 39), Cr 11, K 6.2,  and +UA. He was also febrile to 101 on 5/22. Renal US was negative for hydronephrosis. Tacro was held. Valcyte was switched to ganciclovir, armenta was placed and patient was started on cefepime for  +UA.  Throughout the admission, patient was given insulin/D50/lasix/kayexylate for hyperkalemia multiple times; required HD once on 5/3 as patient became anuric.          UOP improved w/ lasix. Armenta was discontinued on 5/4, patient voiding freely since and has good UOP. Cefepime discontinued on 5/8 as urine cx had no growth; patient was treated emperically from 5/1-5/8 for +UA.  Tacrolimus was restarted once level normalized. Patient is being discharged on tacro 3/3 (level today is 4.3, therefore tacro was increased from 2/2 to 3/3). MMF was held due to infection, will be restarted as outpatient. CMV PCR from 5/7 is downtrending [id number] (log 4.05<4.35). Patient is being discharged on valcyte 450 daily.         Patient is voiding freely, having BMs, ambulating, tolerating a diet, Cr downtrending, K normalized, Tac level normalized. He was evaluated by the multi-disciplinary team including surgeon, nephrologist, NP, pharmacist, nutrition, social work, and nursing and deemed stable for discharge to home with close outpatient follow-up.

## 2019-05-09 NOTE — DISCHARGE NOTE PROVIDER - NSDCFUADDAPPT_GEN_ALL_CORE_FT
1. Please call to make a follow-up appointment with Dr. Bhakta and labwork for Monday, May 13th.   2. Please follow up with your primary care physician in one week regarding your hospitalization.    The outpatient office address for Dr. Bhakta is:  [address] [place], [place] [id number]  On the day of your labwork appointment, do not take the tacrolimus (prograf). Bring this medication with you and take it once labwork has been done.

## 2019-05-09 NOTE — DISCHARGE NOTE PROVIDER - CARE PROVIDER_API CALL
Arian Bhakta (DO)  Nephrology  [address] [place] [address]  Phone: [phone]  Fax: [phone]  Follow Up Time:

## 2019-05-09 NOTE — DISCHARGE NOTE NURSING/CASE MANAGEMENT/SOCIAL WORK - NSDCDPATPORTLINK_GEN_ALL_CORE
You can access the [facility] Portal, offered by [facility], by registering with the following website: [email or web address]

## 2019-05-09 NOTE — DISCHARGE NOTE PROVIDER - NSDCCPCAREPLAN_GEN_ALL_CORE_FT
PRINCIPAL DISCHARGE DIAGNOSIS  Diagnosis: Acute renal failure (ARF)  Assessment and Plan of Treatment: Your creatnine level was very high when you came in. It was likely due to the high amount of tacrolimus (prograf) you had in your body. We did not give you the tacrolimus in the hospital until your tacrolimus level was normalized. Your creatnine has been coming down daily, and it will continue to be checked as outpatient.   Please take the tacrolimus only as directed and do not take extra doses. This medication can be very dangerous if it is taken in access. Your tacrolimus level will be checked as outpatient next week as well.

## 2019-05-09 NOTE — PROGRESS NOTE ADULT - ASSESSMENT
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    1.  Renal transplant - CHELA possibly from pyelonephritis, and tacrolimus toxicity.  Slowly improving, can be discharged today.       2.  CHELA - as above.  Hyperkalemia resolved.  Spoke to pt about maintaining strict low K diet at home.  SQ heparin may also be contributing.    3.  Immunosuppression - Back on tacro and pred 5.  Hold MMF as pt leukopenic and with severe infections - UTI and CMV,  Target tacro 5-7.   4.  UTI/ pyelonephritis - completed course of cefepime.   5.  Hyponatremia - improving/ stable.  Will likely resolve with improvement of renal function.    6.  HCV - genotype 1a s/p epclusa treatment with cure.  7.  CMV - pcr now falling.  Change to valcyte 450mgs daily.    8.  DM2 - Hold for now and cover with sliding scale.  At home pt to resume home medications.   9.  Oxalaturia - hold calcium and bicitra for now.  Will resume at discharge.   10.  HTN - controlled on metoprolol.

## 2019-05-09 NOTE — PROGRESS NOTE ADULT - REASON FOR ADMISSION
severe CHELA

## 2019-05-09 NOTE — PROGRESS NOTE ADULT - ASSESSMENT
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     Problem:   s/p DDRT, admitted with CHELA secondary to FK toxicity   - K improving to 5.2 from 5.4.   - Strict I&O.  - Immuno:  MMF on hold. Continue FK, Pred 5mg. Continue home Atovaquone.  - F/U daily tac level   - PPX: CMV Viremia- CMV PCR downtrending [id number] (Lg 4.05<4.35). Dced Gancyclovir, started valcyte 450 daily.   - Continue ambulation    UTI  - off Abx  - Urine culture 4/3: NGTD, off abx; was treated emperically w/ cefepime for +UA from admission.     HTN  - Well controlled  - cont Metoprolol 50mg daily and nifedipine xl 30mg daily    CMV Viremia  - dced gancylcovir, started valcyte 450 daily  -CMV Viremia- CMV PCR downtrending [id number] (Lg 4.05<4.35).

## 2019-05-09 NOTE — DISCHARGE NOTE NURSING/CASE MANAGEMENT/SOCIAL WORK - NSDCFUADDAPPT_GEN_ALL_CORE_FT
1. Please call to make a follow-up appointment with Dr. Bhakta and labwork for Monday, May 13th.   2. Please follow up with your primary care physician in one week regarding your hospitalization.    The outpatient office address for Dr. Bhakta is:  [address] [place], [place] [id number]  On the day of your labwork appointment, do not take the tacrolimus (prograf). Bring this medication with you and take it once labwork has been done.

## 2019-05-09 NOTE — PROGRESS NOTE ADULT - SUBJECTIVE AND OBJECTIVE BOX
[facility] DIVISION OF KIDNEY DISEASES AND HYPERTENSION -- FOLLOW UP NOTE  --------------------------------------------------------------------------------  Chief Complaint:  CHELA    24 hour events/subjective:  Pt feels well, no diarrhea.  Eating well and tolerating fluid.  Urinating normally.        PAST HISTORY  --------------------------------------------------------------------------------  No significant changes to PMH, PSH, FHx, SHx, unless otherwise noted    ALLERGIES & MEDICATIONS  --------------------------------------------------------------------------------  Allergies    No Known Allergies    Intolerances      Standing Inpatient Medications  atovaquone Suspension 1500 milliGRAM(s) Oral daily  dextrose 50% Injectable 25 Gram(s) IV Push once  dextrose 50% Injectable 25 Gram(s) IV Push once  gabapentin 100 milliGRAM(s) Oral daily  ganciclovir IVPB 60 milliGRAM(s) IV Intermittent daily  heparin  Injectable 5000 Unit(s) SubCutaneous every 12 hours  insulin lispro (HumaLOG) corrective regimen sliding scale   SubCutaneous three times a day before meals  insulin lispro (HumaLOG) corrective regimen sliding scale   SubCutaneous at bedtime  metoprolol tartrate 50 milliGRAM(s) Oral daily  NIFEdipine XL 30 milliGRAM(s) Oral daily  predniSONE   Tablet 5 milliGRAM(s) Oral daily  tacrolimus 3 milliGRAM(s) Oral two times a day  tamsulosin 0.4 milliGRAM(s) Oral at bedtime    PRN Inpatient Medications  chlorhexidine 4% Liquid 1 Application(s) Topical every 12 hours PRN  ondansetron Injectable 4 milliGRAM(s) IV Push every 6 hours PRN      REVIEW OF SYSTEMS  --------------------------------------------------------------------------------  Gen: No fatigue, fevers/chills, weakness  Skin: No rashes  Head/Eyes/Ears/Mouth: No headache;No sore throat  Respiratory: No dyspnea, cough,   CV: No chest pain, PND, orthopnea  GI: No abdominal pain, diarrhea, constipation, nausea, vomiting  Transplant: No pain  : No increased frequency, dysuria, hematuria, nocturia  MSK: No joint pain/swelling; no back pain; no edema  Neuro: No dizziness/lightheadedness, weakness, seizures, numbness, tingling  Psych: No significant nervousness, anxiety, stress, depression    All other systems were reviewed and are negative, except as noted.    VITALS/PHYSICAL EXAM  --------------------------------------------------------------------------------  T(C): 36.8 (05-09-19 @ 13:48), Max: 37.1 (05-09-19 @ 01:13)  HR: 80 (05-09-19 @ 13:48) (76 - 87)  BP: 147/66 (05-09-19 @ 13:48) (139/81 - 165/76)  RR: 18 (05-09-19 @ 13:48) (18 - 18)  SpO2: 98% (05-09-19 @ 13:48) (97% - 99%)  Wt(kg): --        05-08-19 @ 07:01  -  05-09-19 @ 07:00  --------------------------------------------------------  IN: 1080 mL / OUT: 1375 mL / NET: -295 mL    05-09-19 @ 07:01  -  05-09-19 @ 16:12  --------------------------------------------------------  IN: 0 mL / OUT: 750 mL / NET: -750 mL      Physical Exam:  	Gen: NAD, well-appearing  	HEENT: PERRL, supple neck, clear oropharynx  	Pulm: CTA B/L  	CV: RRR, S1S2; no rub  	Back: No spinal or CVA tenderness; no sacral edema  	Abd: +BS, soft, nontender/nondistended                      Transplant: No tenderness, swelling  	: No suprapubic tenderness  	UE: Warm, FROM, intact strength; no edema; no asterixis  	LE: Warm, FROM, intact strength; no edema  	Neuro: No focal deficits, intact gait  	Psych: Normal affect and mood  	Skin: Warm, without rashes      LABS/STUDIES  --------------------------------------------------------------------------------              8.5    3.3   >-----------<  163      [05-09-19 @ 06:18]              25.6     132  |  98  |  71  ----------------------------<  176      [05-09-19 @ 06:17]  5.2   |  22  |  4.94        Ca     8.8     [05-09-19 @ 06:17]      Mg     1.7     [05-09-19 @ 06:17]      Phos  3.7     [05-09-19 @ 06:17]        PTT: 31.9       [05-09-19 @ 06:18]      Creatinine Trend:  SCr 4.94 [05-09 @ 06:17]  SCr 5.20 [05-08 @ 06:02]  SCr 6.16 [05-07 @ 06:10]  SCr 6.70 [05-06 @ 06:27]  SCr 6.96 [05-05 @ 17:00]    Tacrolimus (), Serum: 4.3 ng/mL (05-09 @ 07:24)  Tacrolimus (), Serum: 4.7 ng/mL (05-08 @ 08:37)  Tacrolimus (), Serum: 4.3 ng/mL (05-07 @ 08:14)  Tacrolimus (), Serum: 6.3 ng/mL (05-06 @ 07:44)            Urinalysis - [05-01-19 @ 15:31]      Color Yellow / Appearance Turbid / SG 1.020 / pH 6.0      Gluc Negative / Ketone Negative  / Bili Negative / Urobili Negative       Blood Moderate / Protein 300 mg/dL / Leuk Est Large / Nitrite Negative      RBC 25 / WBC >50 / Hyaline 2 / Gran  / Sq Epi  / Non Sq Epi 2 / Bacteria TNTC      HbA1c 6.4      [05-02-19 @ 08:36]    HBsAb <3.0      [05-03-19 @ 22:44]  HBsAg Nonreact      [05-03-19 @ 22:44]  HBcAb Nonreact      [05-03-19 @ 22:44]  HCV 0.07, Nonreact      [05-03-19 @ 22:44]

## 2019-05-09 NOTE — PROGRESS NOTE ADULT - ATTENDING COMMENTS
Immunosuppression levels reviewed. Continue FK dose at 2/2. no changes. Progressing well.
admitted with CHELA.  Likely 2/2 FK toxicity.  Cr now improving.     Also with CMV.  Levels are down on Valcyte.    Immuno: tac 3+3, MMF held, pred 5mg
s/p DDRT admitted with UTI and prograf toxicity (level 39)  Received HD Friday night for worsening hyperkalemia (6.8). K has been stable, 5.0 this afternoon  Immunosuppression- Prograf at 2mg bid;, Cont prednisone 5mg daily  abx for UTI. cont cefepime  CMV viremia- cont gancyclovir, monitor levels  hyperkalemia- responded to dialysis. cont to monitor  CHELA non-oliguric, making 1L urine/day. cont lasix 60mg daily. no need for HD today
s/p DDRT admitted with UTI and prograf toxicity (level 39)  Received HD last night for worsening hyperkalemia (6.8)  Immunosuppression- Restart Prograf at 2mg bid;, Cont prednisone 5mg daily  abx for UTI. cont cefepime  CMV viremia- cont gancyclovir, monitor levels  hyperkalemia- responded to dialysis. cont to monitor
s/p DDRT admitted with UTI and prograf toxicity (level 40)  +hyperkalemia, responded to shifting earlier  Immunosuppression- Cont to hold prograf until level <10, Cont prednisone 5mg daily  abx for UTI. cont cefepime  CMV viremia- cont gancyclovir, monitor levels  hyperkalemia- trial lasix, treat with insulin, albuterol. repeat labs- if no improvement will need hemodialysis
Immunosuppression levels reviewed, no changes at this time. Continue current dose .
s/p DDRT admitted with UTI and prograf toxicity (level 39)  Received HD Friday night for worsening hyperkalemia (6.8). K has been stable, 5.0 this afternoon  Immunosuppression- Prograf 2mg bid;, Cont prednisone 5mg daily  abx for UTI. cont cefepime  CMV viremia- cont gancyclovir, monitor levels, which have been worsening  CHELA non-oliguric, making 1L urine/day. hold lasix. no need for HD today
Kidney Transplant recipient, admitted with CHELA, elevated Tac level, abnormal electrolytes, DM, HTN, diarrhea, CMV Viremia.  Reviewed immunosuppression and allograft function  Tac on hold, on prednisone  Plan:  Monitor I/O, blood chemistry, Tac level  Blood and urine culture  Hemodialysis  I have seen the patient and reviewed dialysis prescription and flow sheet. Dialysis access is functioning well. Patient is tolerating dialysis well with no acute symptoms or distress. Dialysis prescription has been adjusted for optimized control of volume status, uremia and electrolytes. Management of additional metabolic abnormalities/anemia will continue to be addressed on follow up.    I was present during and reviewed clinical and lab data as well as assessment and plan as documented  . Please contact if any additional questions with any change in clinical condition or on availability of any additional information or reports.
Kidney Transplant recipient, admitted with CHELA, elevated Tac level, abnormal electrolytes, DM, HTN, diarrhea, CMV Viremia.  Reviewed immunosuppression and allograft function  Tac on hold, on prednisone  Plan:  Monitor I/O, blood chemistry, Tac level  Blood and urine culture  Monitor for hemodialysis need/allograft function recovery  On Ganciclovir, adjusted doses  Will follow  I was present during and reviewed clinical and lab data as well as assessment and plan as documented by the house staff as noted. Please contact if any additional questions with any change in clinical condition or on availability of any additional information or reports.
Kidney Transplant recipient, admitted with CHELA, elevated Tac level, abnormal electrolytes, DM, HTN, diarrhea, CMV Viremia.  Reviewed immunosuppression and allograft function  Tac on hold, on prednisone  Plan:  Monitor I/O, blood chemistry, Tac level  s/p Hemodialysis yesterday for hyperkalemia that resolved post HD.   Continue with low potassium diet  Continue nifedipine for HTN, BP stable.
remains with severe CHELA. FK toxicity and UTI. also rising CMV viremia.  FK on hold; level down to 23. on IV antibiotics and iv ganciclovir.  Cellcept on hold. will monitor closely

## 2019-05-13 ENCOUNTER — APPOINTMENT (OUTPATIENT)
Dept: NEPHROLOGY | Facility: CLINIC | Age: 69
End: 2019-05-13
Payer: MEDICARE

## 2019-05-13 ENCOUNTER — LABORATORY RESULT (OUTPATIENT)
Age: 69
End: 2019-05-13

## 2019-05-13 VITALS
DIASTOLIC BLOOD PRESSURE: 63 MMHG | TEMPERATURE: 98.8 F | OXYGEN SATURATION: 96 % | SYSTOLIC BLOOD PRESSURE: 123 MMHG | HEART RATE: 98 BPM | WEIGHT: 220 LBS | HEIGHT: 71 IN | BODY MASS INDEX: 30.8 KG/M2

## 2019-05-13 LAB
ALBUMIN SERPL ELPH-MCNC: 3.7 G/DL
ALP BLD-CCNC: 89 U/L
ALT SERPL-CCNC: 41 U/L
ANION GAP SERPL CALC-SCNC: 13 MMOL/L
AST SERPL-CCNC: 47 U/L
BILIRUB SERPL-MCNC: 0.3 MG/DL
BUN SERPL-MCNC: 54 MG/DL
CALCIUM SERPL-MCNC: 9.4 MG/DL
CHLORIDE SERPL-SCNC: 105 MMOL/L
CO2 SERPL-SCNC: 22 MMOL/L
CREAT SERPL-MCNC: 4.22 MG/DL
GLUCOSE SERPL-MCNC: 292 MG/DL
LDH SERPL-CCNC: 247 U/L
MAGNESIUM SERPL-MCNC: 1.8 MG/DL
PHOSPHATE SERPL-MCNC: 2.9 MG/DL
POTASSIUM SERPL-SCNC: 5.8 MMOL/L
PROT SERPL-MCNC: 6.3 G/DL
SODIUM SERPL-SCNC: 140 MMOL/L
URATE SERPL-MCNC: 8.2 MG/DL

## 2019-05-13 PROCEDURE — 99214 OFFICE O/P EST MOD 30 MIN: CPT

## 2019-05-13 RX ORDER — MYCOPHENOLATE MOFETIL 500 MG/1
500 TABLET ORAL
Qty: 60 | Refills: 11 | Status: DISCONTINUED | COMMUNITY
Start: 2018-08-27 | End: 2019-05-13

## 2019-05-13 NOTE — ASSESSMENT
[FreeTextEntry1] : 1.  Kidney transplantation - Pts aaron creatinine 1.9 but had CHELA with pyelonpheritis. Has had multiple episodes of CHELA - left hospital last week with creatinine in 4's.  \par Has had last biopsy which revealed borderline rejection and oxalate crystals which was treated with steroids.  Nephrostomy and JJ stent have been removed. No hydronephrosis in hospital sonograms. \par 2.  Immunosuppression - simulect induction, tacrolimus target 5-7, held MMF due to recent infections and diarrhea. \par 3.  DM2 - on januvia and glimepiride.  BG controlled at home. \par 4.  HTN - controlled.  May be low at home - pt will check his pressure and if systolic < 110 will stop nifedipine.  \par 5.  HCV - genotype 3a.  completed Epclusa.  Last vl note detected.  \par 6.  Anemia - due to CKD/CHELA - received aranesp from his hematologist last week.  \par 7.  Chronic diarrhea - on and off on lomotil prn.  Has rectal fissure.  Has seen GI and had a colonoscopy.   Has history of small bowel resection which may be the cause of diarrhea.  \par 8.  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  \par 9.  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  Will repeat today.  Cont valcyte 450mgs daily (renal dosing).  \par \par Pt will return for f/u in 2 weeks. \par He will also see Dr. Bonilla.  \par

## 2019-05-13 NOTE — CONSULT LETTER
[Courtesy Letter:] : I had the pleasure of seeing your patient, [unfilled], in my office today. [Dear  ___] : Dear  [unfilled], [Sincerely,] : Sincerely, [Please see my note below.] : Please see my note below. [FreeTextEntry3] : Arian Bhakta, DO

## 2019-05-13 NOTE — HISTORY OF PRESENT ILLNESS
[FreeTextEntry1] : Mr. Medrano is a 67 yrs old male ESRD secondary to DM2 (HD since 2015, followed by Dr. Bonilla) s/p  donor kidney transplant on 18.\par HCV donor. 40 yrs old male. cold ischemia time 23 hrs. delayed graft function. \par \par PT was readmitted for anemia and hematoma.  Required 2 kidney biopsies which revealed few oxalate crystals and borderline rejection for which he was treated with solumedrol 375 and prednisone taper.  \par \par Interval events:\par Pt readmitted to hospital with CHELA.  Pt came in prograf toxic, with UTI and cmv viremia.  During hospitalization creatinine decreased from around 10 to 4.  Initially had diarrhea which resolved.  CMV pcr peaked at 20,000 then decreased to 10,000 on ganciclovir.  He has been feeling very tired.  Last week had an appointment with hematology and received aranespt injection.  Diarrhea returned and he took lomotil.  He has not checked his blood pressure at home.

## 2019-05-13 NOTE — PHYSICAL EXAM
[General Appearance - In No Acute Distress] : in no acute distress [General Appearance - Alert] : alert [General Appearance - Well Nourished] : well nourished [General Appearance - Well Developed] : well developed [General Appearance - Well-Appearing] : healthy appearing [Sclera] : the sclera and conjunctiva were normal [Outer Ear] : the ears and nose were normal in appearance [Extraocular Movements] : extraocular movements were intact [Neck Appearance] : the appearance of the neck was normal [Neck Cervical Mass (___cm)] : no neck mass was observed [Jugular Venous Distention Increased] : there was no jugular-venous distention [Respiration, Rhythm And Depth] : normal respiratory rhythm and effort [Exaggerated Use Of Accessory Muscles For Inspiration] : no accessory muscle use [Auscultation Breath Sounds / Voice Sounds] : lungs were clear to auscultation bilaterally [Heart Rate And Rhythm] : heart rate was normal and rhythm regular [Heart Sounds] : normal S1 and S2 [Heart Sounds Gallop] : no gallops [Abdomen Soft] : soft [Abdomen Tenderness] : non-tender [Bowel Sounds] : normal bowel sounds [] : no hepato-splenomegaly [Cervical Lymph Nodes Enlarged Posterior Bilaterally] : posterior cervical [Cervical Lymph Nodes Enlarged Anterior Bilaterally] : anterior cervical [Supraclavicular Lymph Nodes Enlarged Bilaterally] : supraclavicular [No CVA Tenderness] : no ~M costovertebral angle tenderness [Abnormal Walk] : normal gait [Nail Clubbing] : no clubbing  or cyanosis of the fingernails [Musculoskeletal - Swelling] : no joint swelling seen [Skin Color & Pigmentation] : normal skin color and pigmentation [___ (cm) Fistula] : [unfilled] (cm) fistula [Skin Turgor] : normal skin turgor [Cranial Nerves] : cranial nerves 2-12 were intact [Oriented To Time, Place, And Person] : oriented to person, place, and time [Sensation] : the sensory exam was normal to light touch and pinprick [Edema] : there was no peripheral edema [FreeTextEntry1] : + ventral hernia, reducible.

## 2019-05-14 ENCOUNTER — TRANSCRIPTION ENCOUNTER (OUTPATIENT)
Age: 69
End: 2019-05-14

## 2019-05-14 LAB
APPEARANCE: CLEAR
BACTERIA: NEGATIVE
BASOPHILS # BLD AUTO: 0.04 K/UL
BASOPHILS NFR BLD AUTO: 1.3 %
BILIRUBIN URINE: NEGATIVE
BKV DNA SPEC QL NAA+PROBE: NOT DETECTED COPIES/ML
BLOOD URINE: ABNORMAL
CMV DNA SPEC QL NAA+PROBE: 2776 IU/ML
CMVPCR LOG: 3.44 LOGIU/ML
COLOR: NORMAL
CREAT SPEC-SCNC: 101 MG/DL
CREAT/PROT UR: 0.6 RATIO
EOSINOPHIL # BLD AUTO: 0 K/UL
EOSINOPHIL NFR BLD AUTO: 0 %
GLUCOSE QUALITATIVE U: ABNORMAL
HCT VFR BLD CALC: 28 %
HGB BLD-MCNC: 8.2 G/DL
HYALINE CASTS: 1 /LPF
IMM GRANULOCYTES NFR BLD AUTO: 1 %
KETONES URINE: NEGATIVE
LEUKOCYTE ESTERASE URINE: NEGATIVE
LYMPHOCYTES # BLD AUTO: 0.64 K/UL
LYMPHOCYTES NFR BLD AUTO: 21.5 %
MAN DIFF?: NORMAL
MCHC RBC-ENTMCNC: 29.3 GM/DL
MCHC RBC-ENTMCNC: 30.6 PG
MCV RBC AUTO: 104.5 FL
MICROSCOPIC-UA: NORMAL
MISCELLANEOUS TEST NAME: SIGNIFICANT CHANGE UP
MISCELLANEOUS, NORMALS: SIGNIFICANT CHANGE UP
MISCELLANEOUS, RESULT: SIGNIFICANT CHANGE UP
MONOCYTES # BLD AUTO: 0.14 K/UL
MONOCYTES NFR BLD AUTO: 4.7 %
NEUTROPHILS # BLD AUTO: 2.12 K/UL
NEUTROPHILS NFR BLD AUTO: 71.5 %
NITRITE URINE: NEGATIVE
PH URINE: 6.5
PLATELET # BLD AUTO: 62 K/UL
PROT UR-MCNC: 58 MG/DL
PROTEIN URINE: ABNORMAL
RBC # BLD: 2.68 M/UL
RBC # FLD: 16.1 %
RED BLOOD CELLS URINE: 1 /HPF
SPECIFIC GRAVITY URINE: 1.01
SQUAMOUS EPITHELIAL CELLS: 1 /HPF
TACROLIMUS SERPL-MCNC: 4.5 NG/ML
URINE COMMENTS: NORMAL
UROBILINOGEN URINE: NORMAL
WBC # FLD AUTO: 2.97 K/UL
WHITE BLOOD CELLS URINE: 7 /HPF

## 2019-05-20 ENCOUNTER — APPOINTMENT (OUTPATIENT)
Dept: TRANSPLANT | Facility: CLINIC | Age: 69
End: 2019-05-20

## 2019-05-20 ENCOUNTER — LABORATORY RESULT (OUTPATIENT)
Age: 69
End: 2019-05-20

## 2019-05-24 LAB
25(OH)D3 SERPL-MCNC: 10.5 NG/ML
ALBUMIN SERPL ELPH-MCNC: 3.9 G/DL
ALP BLD-CCNC: 83 U/L
ALT SERPL-CCNC: 30 U/L
ANION GAP SERPL CALC-SCNC: 12 MMOL/L
APPEARANCE: CLEAR
AST SERPL-CCNC: 28 U/L
BACTERIA: ABNORMAL
BASOPHILS # BLD AUTO: 0 K/UL
BASOPHILS NFR BLD AUTO: 0 %
BILIRUB SERPL-MCNC: 0.3 MG/DL
BILIRUBIN URINE: NEGATIVE
BKV DNA SPEC QL NAA+PROBE: NOT DETECTED COPIES/ML
BLOOD URINE: NORMAL
BUN SERPL-MCNC: 32 MG/DL
CALCIUM SERPL-MCNC: 9.1 MG/DL
CHLORIDE SERPL-SCNC: 107 MMOL/L
CHOLEST SERPL-MCNC: 116 MG/DL
CHOLEST/HDLC SERPL: 2.1 RATIO
CMV DNA SPEC QL NAA+PROBE: 722 IU/ML
CMVPCR LOG: 2.86 LOGIU/ML
CO2 SERPL-SCNC: 22 MMOL/L
COLOR: NORMAL
CREAT SERPL-MCNC: 3.38 MG/DL
CREAT SPEC-SCNC: 107 MG/DL
CREAT/PROT UR: 0.6 RATIO
EOSINOPHIL # BLD AUTO: 0 K/UL
EOSINOPHIL NFR BLD AUTO: 0 %
ESTIMATED AVERAGE GLUCOSE: 131 MG/DL
GLUCOSE QUALITATIVE U: NORMAL
GLUCOSE SERPL-MCNC: 164 MG/DL
HBA1C MFR BLD HPLC: 6.2 %
HCT VFR BLD CALC: 30.6 %
HDLC SERPL-MCNC: 55 MG/DL
HGB BLD-MCNC: 8.7 G/DL
HYALINE CASTS: 2 /LPF
KETONES URINE: NEGATIVE
LDH SERPL-CCNC: 240 U/L
LDLC SERPL CALC-MCNC: 33 MG/DL
LEUKOCYTE ESTERASE URINE: ABNORMAL
LYMPHOCYTES # BLD AUTO: 0.35 K/UL
LYMPHOCYTES NFR BLD AUTO: 15 %
MAGNESIUM SERPL-MCNC: 1.8 MG/DL
MAN DIFF?: NORMAL
MCHC RBC-ENTMCNC: 28.4 GM/DL
MCHC RBC-ENTMCNC: 31.8 PG
MCV RBC AUTO: 111.7 FL
MICROSCOPIC-UA: NORMAL
MONOCYTES # BLD AUTO: 0.19 K/UL
MONOCYTES NFR BLD AUTO: 8 %
NEUTROPHILS # BLD AUTO: 1.77 K/UL
NEUTROPHILS NFR BLD AUTO: 75.2 %
NITRITE URINE: NEGATIVE
PH URINE: 7
PHOSPHATE SERPL-MCNC: 2.3 MG/DL
PLATELET # BLD AUTO: 187 K/UL
POTASSIUM SERPL-SCNC: 5.2 MMOL/L
PROT SERPL-MCNC: 6.5 G/DL
PROT UR-MCNC: 67 MG/DL
PROTEIN URINE: ABNORMAL
RBC # BLD: 2.74 M/UL
RBC # FLD: 18.6 %
RED BLOOD CELLS URINE: 3 /HPF
SODIUM SERPL-SCNC: 140 MMOL/L
SPECIFIC GRAVITY URINE: 1.01
SQUAMOUS EPITHELIAL CELLS: 0 /HPF
TACROLIMUS SERPL-MCNC: 4 NG/ML
TRIGL SERPL-MCNC: 140 MG/DL
URATE SERPL-MCNC: 6.9 MG/DL
UROBILINOGEN URINE: NORMAL
WBC # FLD AUTO: 2.32 K/UL
WHITE BLOOD CELLS URINE: 82 /HPF

## 2019-06-06 ENCOUNTER — APPOINTMENT (OUTPATIENT)
Dept: NEPHROLOGY | Facility: CLINIC | Age: 69
End: 2019-06-06
Payer: MEDICARE

## 2019-06-06 VITALS
OXYGEN SATURATION: 96 % | SYSTOLIC BLOOD PRESSURE: 131 MMHG | BODY MASS INDEX: 29.82 KG/M2 | HEART RATE: 112 BPM | RESPIRATION RATE: 16 BRPM | WEIGHT: 213 LBS | HEIGHT: 71 IN | TEMPERATURE: 99.1 F | DIASTOLIC BLOOD PRESSURE: 71 MMHG

## 2019-06-06 PROCEDURE — 99214 OFFICE O/P EST MOD 30 MIN: CPT

## 2019-06-06 NOTE — PHYSICAL EXAM
[General Appearance - In No Acute Distress] : in no acute distress [General Appearance - Alert] : alert [General Appearance - Well Nourished] : well nourished [General Appearance - Well-Appearing] : healthy appearing [General Appearance - Well Developed] : well developed [Sclera] : the sclera and conjunctiva were normal [Extraocular Movements] : extraocular movements were intact [Neck Appearance] : the appearance of the neck was normal [Outer Ear] : the ears and nose were normal in appearance [Jugular Venous Distention Increased] : there was no jugular-venous distention [Neck Cervical Mass (___cm)] : no neck mass was observed [Respiration, Rhythm And Depth] : normal respiratory rhythm and effort [Exaggerated Use Of Accessory Muscles For Inspiration] : no accessory muscle use [Auscultation Breath Sounds / Voice Sounds] : lungs were clear to auscultation bilaterally [Heart Rate And Rhythm] : heart rate was normal and rhythm regular [Heart Sounds] : normal S1 and S2 [Bowel Sounds] : normal bowel sounds [Edema] : there was no peripheral edema [Heart Sounds Gallop] : no gallops [Abdomen Soft] : soft [Abdomen Tenderness] : non-tender [] : no hepato-splenomegaly [Cervical Lymph Nodes Enlarged Posterior Bilaterally] : posterior cervical [Cervical Lymph Nodes Enlarged Anterior Bilaterally] : anterior cervical [Supraclavicular Lymph Nodes Enlarged Bilaterally] : supraclavicular [No CVA Tenderness] : no ~M costovertebral angle tenderness [Abnormal Walk] : normal gait [Nail Clubbing] : no clubbing  or cyanosis of the fingernails [___ (cm) Fistula] : [unfilled] (cm) fistula [Musculoskeletal - Swelling] : no joint swelling seen [Skin Color & Pigmentation] : normal skin color and pigmentation [Skin Turgor] : normal skin turgor [Cranial Nerves] : cranial nerves 2-12 were intact [Sensation] : the sensory exam was normal to light touch and pinprick [Oriented To Time, Place, And Person] : oriented to person, place, and time [FreeTextEntry1] : + ventral hernia, reducible.

## 2019-06-06 NOTE — CONSULT LETTER
[Dear  ___] : Dear  [unfilled], [Please see my note below.] : Please see my note below. [Courtesy Letter:] : I had the pleasure of seeing your patient, [unfilled], in my office today. [Sincerely,] : Sincerely, [FreeTextEntry3] : Arian Bhakta, DO

## 2019-06-06 NOTE — HISTORY OF PRESENT ILLNESS
[FreeTextEntry1] : Mr. Medrano is a 67 yrs old male ESRD secondary to DM2 (HD since 2015, followed by Dr. Bonilla) s/p  donor kidney transplant on 18.\par HCV donor. 40 yrs old male. cold ischemia time 23 hrs. delayed graft function. \par \par PT was readmitted for anemia and hematoma.  Required 2 kidney biopsies which revealed few oxalate crystals and borderline rejection for which he was treated with solumedrol 375 and prednisone taper.  \par \par Interval events:\par Pt readmitted to hospital with CHELA.  Pt came in prograf toxic, with UTI and cmv viremia.  During hospitalization creatinine decreased from around 10 to 4.  Initially had diarrhea which resolved.  CMV pcr peaked at 20,000 then decreased to 10,000 on ganciclovir.  since last outpatient visit he has greatly improved.  Feels better and stronger.  Walked 1 mile yesterday. Still has the occasional loose stool and uses imodium about twice per day.

## 2019-06-06 NOTE — ASSESSMENT
[FreeTextEntry1] : 1.  Kidney transplantation - Pts aaron creatinine 1.9 but had CHELA with pyelonephritis. Has had multiple episodes of CHELA.  Creatinine today still 3.3 and K 5.7.  Will plan to repeat renal sonogram.  May need repeat kidney biopsy.  \par Last biopsy revealed borderline rejection and oxalate crystals which was treated with steroids.  Nephrostomy and JJ stent have been removed. No hydronephrosis in hospital sonograms. \par 2.  Immunosuppression - simulect induction, tacrolimus target 5-7, held MMF due to recent infections and diarrhea. \par 3.  DM2 - on januvia and glimepiride.  BG controlled at home. \par 4.  HTN - controlled.  \par 5.  HCV - genotype 3a.  completed Epclusa.  Last vl note detected.  \par 6.  Anemia - due to CKD/CHELA - received aranesp from his hematologist few weeks ago.   \par 7.  Chronic diarrhea - on and off on lomotil prn.  Has rectal fissure.  Has seen GI and had a colonoscopy.   Has history of small bowel resection which may be the cause of diarrhea.  \par 8.  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  \par 9.  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  Will repeat today.  Cont valcyte 450mgs daily (renal dosing).  \par \par Pt will return for f/u in 2 weeks. \par He will also see Dr. Bonilla.  \par

## 2019-06-07 LAB
ALBUMIN SERPL ELPH-MCNC: 4 G/DL
ALP BLD-CCNC: 95 U/L
ALT SERPL-CCNC: 21 U/L
ANION GAP SERPL CALC-SCNC: 16 MMOL/L
APPEARANCE: ABNORMAL
AST SERPL-CCNC: 25 U/L
BACTERIA: ABNORMAL
BASOPHILS # BLD AUTO: 0.06 K/UL
BASOPHILS NFR BLD AUTO: 0.6 %
BILIRUB SERPL-MCNC: 0.2 MG/DL
BILIRUBIN URINE: NEGATIVE
BLOOD URINE: ABNORMAL
BUN SERPL-MCNC: 33 MG/DL
CALCIUM SERPL-MCNC: 10.2 MG/DL
CHLORIDE SERPL-SCNC: 105 MMOL/L
CO2 SERPL-SCNC: 21 MMOL/L
COLOR: YELLOW
CREAT SERPL-MCNC: 3.35 MG/DL
CREAT SPEC-SCNC: 100 MG/DL
CREAT/PROT UR: 1.1 RATIO
EOSINOPHIL # BLD AUTO: 0.11 K/UL
EOSINOPHIL NFR BLD AUTO: 1.1 %
GLUCOSE QUALITATIVE U: NEGATIVE
GLUCOSE SERPL-MCNC: 204 MG/DL
HCT VFR BLD CALC: 32.6 %
HGB BLD-MCNC: 9.7 G/DL
HYALINE CASTS: 1 /LPF
IMM GRANULOCYTES NFR BLD AUTO: 4.7 %
KETONES URINE: NEGATIVE
LDH SERPL-CCNC: 245 U/L
LEUKOCYTE ESTERASE URINE: ABNORMAL
LYMPHOCYTES # BLD AUTO: 1.86 K/UL
LYMPHOCYTES NFR BLD AUTO: 18.8 %
MAGNESIUM SERPL-MCNC: 1.6 MG/DL
MAN DIFF?: NORMAL
MCHC RBC-ENTMCNC: 29.8 GM/DL
MCHC RBC-ENTMCNC: 30.7 PG
MCV RBC AUTO: 103.2 FL
MICROSCOPIC-UA: NORMAL
MONOCYTES # BLD AUTO: 0.95 K/UL
MONOCYTES NFR BLD AUTO: 9.6 %
NEUTROPHILS # BLD AUTO: 6.45 K/UL
NEUTROPHILS NFR BLD AUTO: 65.2 %
NITRITE URINE: NEGATIVE
PH URINE: 6
PHOSPHATE SERPL-MCNC: 3.3 MG/DL
PLATELET # BLD AUTO: 133 K/UL
POTASSIUM SERPL-SCNC: 5.7 MMOL/L
PROT SERPL-MCNC: 7.1 G/DL
PROT UR-MCNC: 107 MG/DL
PROTEIN URINE: ABNORMAL
RBC # BLD: 3.16 M/UL
RBC # FLD: 17.2 %
RED BLOOD CELLS URINE: 8 /HPF
SODIUM SERPL-SCNC: 142 MMOL/L
SPECIFIC GRAVITY URINE: 1.01
SQUAMOUS EPITHELIAL CELLS: 1 /HPF
TACROLIMUS SERPL-MCNC: 6.7 NG/ML
URATE SERPL-MCNC: 6.4 MG/DL
UROBILINOGEN URINE: NORMAL
WBC # FLD AUTO: 9.89 K/UL
WHITE BLOOD CELLS URINE: 710 /HPF

## 2019-06-12 ENCOUNTER — LABORATORY RESULT (OUTPATIENT)
Age: 69
End: 2019-06-12

## 2019-06-12 ENCOUNTER — OUTPATIENT (OUTPATIENT)
Dept: OUTPATIENT SERVICES | Facility: HOSPITAL | Age: 69
LOS: 1 days | End: 2019-06-12
Payer: MEDICARE

## 2019-06-12 ENCOUNTER — APPOINTMENT (OUTPATIENT)
Dept: ULTRASOUND IMAGING | Facility: CLINIC | Age: 69
End: 2019-06-12
Payer: MEDICARE

## 2019-06-12 ENCOUNTER — APPOINTMENT (OUTPATIENT)
Dept: TRANSPLANT | Facility: CLINIC | Age: 69
End: 2019-06-12

## 2019-06-12 DIAGNOSIS — Z98.890 OTHER SPECIFIED POSTPROCEDURAL STATES: Chronic | ICD-10-CM

## 2019-06-12 DIAGNOSIS — Z94.0 KIDNEY TRANSPLANT STATUS: Chronic | ICD-10-CM

## 2019-06-12 DIAGNOSIS — Z90.5 ACQUIRED ABSENCE OF KIDNEY: Chronic | ICD-10-CM

## 2019-06-12 DIAGNOSIS — I77.0 ARTERIOVENOUS FISTULA, ACQUIRED: Chronic | ICD-10-CM

## 2019-06-12 DIAGNOSIS — Z94.0 KIDNEY TRANSPLANT STATUS: ICD-10-CM

## 2019-06-12 PROCEDURE — 93975 VASCULAR STUDY: CPT

## 2019-06-12 PROCEDURE — 93975 VASCULAR STUDY: CPT | Mod: 26

## 2019-06-18 LAB
ALBUMIN SERPL ELPH-MCNC: 4.2 G/DL
ALP BLD-CCNC: 91 U/L
ALT SERPL-CCNC: 13 U/L
ANION GAP SERPL CALC-SCNC: 13 MMOL/L
APPEARANCE: ABNORMAL
AST SERPL-CCNC: 20 U/L
BACTERIA: ABNORMAL
BASOPHILS # BLD AUTO: 0.06 K/UL
BASOPHILS NFR BLD AUTO: 0.6 %
BILIRUB SERPL-MCNC: 0.2 MG/DL
BILIRUBIN URINE: NEGATIVE
BKV DNA SPEC QL NAA+PROBE: NOT DETECTED COPIES/ML
BKV DNA SPEC QL NAA+PROBE: NOT DETECTED COPIES/ML
BLOOD URINE: ABNORMAL
BUN SERPL-MCNC: 39 MG/DL
CALCIUM SERPL-MCNC: 10 MG/DL
CHLORIDE SERPL-SCNC: 103 MMOL/L
CMV DNA SPEC QL NAA+PROBE: 316 IU/ML
CMV DNA SPEC QL NAA+PROBE: ABNORMAL IU/ML
CMVPCR LOG: 2.5 LOGIU/ML
CMVPCR LOG: ABNORMAL LOGIU/ML
CO2 SERPL-SCNC: 22 MMOL/L
COLOR: YELLOW
CREAT SERPL-MCNC: 3.34 MG/DL
CREAT SPEC-SCNC: 124 MG/DL
CREAT/PROT UR: 0.3 RATIO
EOSINOPHIL # BLD AUTO: 0.11 K/UL
EOSINOPHIL NFR BLD AUTO: 1.1 %
GLUCOSE QUALITATIVE U: ABNORMAL
GLUCOSE SERPL-MCNC: 221 MG/DL
HCT VFR BLD CALC: 29.5 %
HGB BLD-MCNC: 9 G/DL
HYALINE CASTS: 0 /LPF
IMM GRANULOCYTES NFR BLD AUTO: 1.3 %
KETONES URINE: NEGATIVE
LDH SERPL-CCNC: 197 U/L
LEUKOCYTE ESTERASE URINE: ABNORMAL
LYMPHOCYTES # BLD AUTO: 1.57 K/UL
LYMPHOCYTES NFR BLD AUTO: 15.7 %
MAGNESIUM SERPL-MCNC: 1.7 MG/DL
MAN DIFF?: NORMAL
MCHC RBC-ENTMCNC: 30.5 GM/DL
MCHC RBC-ENTMCNC: 31.1 PG
MCV RBC AUTO: 102.1 FL
MICROSCOPIC-UA: NORMAL
MONOCYTES # BLD AUTO: 0.62 K/UL
MONOCYTES NFR BLD AUTO: 6.2 %
NEUTROPHILS # BLD AUTO: 7.49 K/UL
NEUTROPHILS NFR BLD AUTO: 75.1 %
NITRITE URINE: NEGATIVE
PH URINE: 6
PHOSPHATE SERPL-MCNC: 3.2 MG/DL
PLATELET # BLD AUTO: NORMAL K/UL
POTASSIUM SERPL-SCNC: 5.1 MMOL/L
PROT SERPL-MCNC: 7.2 G/DL
PROT UR-MCNC: 38 MG/DL
PROTEIN URINE: ABNORMAL
RBC # BLD: 2.89 M/UL
RBC # FLD: 16.9 %
RED BLOOD CELLS URINE: 6 /HPF
SODIUM SERPL-SCNC: 138 MMOL/L
SPECIFIC GRAVITY URINE: 1.01
SQUAMOUS EPITHELIAL CELLS: 1 /HPF
TACROLIMUS SERPL-MCNC: 5 NG/ML
UROBILINOGEN URINE: NORMAL
WBC # FLD AUTO: 9.98 K/UL
WHITE BLOOD CELLS URINE: 437 /HPF

## 2019-06-20 ENCOUNTER — LABORATORY RESULT (OUTPATIENT)
Age: 69
End: 2019-06-20

## 2019-06-20 ENCOUNTER — APPOINTMENT (OUTPATIENT)
Dept: TRANSPLANT | Facility: CLINIC | Age: 69
End: 2019-06-20

## 2019-06-21 LAB
ALBUMIN SERPL ELPH-MCNC: 4 G/DL
ALP BLD-CCNC: 81 U/L
ALT SERPL-CCNC: 15 U/L
ANION GAP SERPL CALC-SCNC: 14 MMOL/L
APPEARANCE: ABNORMAL
AST SERPL-CCNC: 20 U/L
BACTERIA: ABNORMAL
BASOPHILS # BLD AUTO: 0.06 K/UL
BASOPHILS NFR BLD AUTO: 0.8 %
BILIRUB SERPL-MCNC: 0.2 MG/DL
BILIRUBIN URINE: NEGATIVE
BLOOD URINE: ABNORMAL
BUN SERPL-MCNC: 35 MG/DL
CALCIUM SERPL-MCNC: 9.5 MG/DL
CHLORIDE SERPL-SCNC: 105 MMOL/L
CMV DNA SPEC QL NAA+PROBE: ABNORMAL IU/ML
CMVPCR LOG: ABNORMAL LOGIU/ML
CO2 SERPL-SCNC: 20 MMOL/L
COLOR: NORMAL
CREAT SERPL-MCNC: 2.96 MG/DL
CREAT SPEC-SCNC: 132 MG/DL
CREAT/PROT UR: 0.3 RATIO
EOSINOPHIL # BLD AUTO: 0.13 K/UL
EOSINOPHIL NFR BLD AUTO: 1.6 %
FERRITIN SERPL-MCNC: 632 NG/ML
FOLATE SERPL-MCNC: >20 NG/ML
GLUCOSE QUALITATIVE U: NEGATIVE
GLUCOSE SERPL-MCNC: 236 MG/DL
HCT VFR BLD CALC: 29 %
HGB BLD-MCNC: 9.2 G/DL
HYALINE CASTS: 0 /LPF
IMM GRANULOCYTES NFR BLD AUTO: 0.6 %
IRON SATN MFR SERPL: 19 %
IRON SERPL-MCNC: 42 UG/DL
KETONES URINE: NEGATIVE
LDH SERPL-CCNC: 160 U/L
LEUKOCYTE ESTERASE URINE: ABNORMAL
LYMPHOCYTES # BLD AUTO: 1.59 K/UL
LYMPHOCYTES NFR BLD AUTO: 20.1 %
MAGNESIUM SERPL-MCNC: 1.4 MG/DL
MAN DIFF?: NORMAL
MCHC RBC-ENTMCNC: 31.3 PG
MCHC RBC-ENTMCNC: 31.7 GM/DL
MCV RBC AUTO: 98.6 FL
MICROSCOPIC-UA: NORMAL
MONOCYTES # BLD AUTO: 0.49 K/UL
MONOCYTES NFR BLD AUTO: 6.2 %
NEUTROPHILS # BLD AUTO: 5.58 K/UL
NEUTROPHILS NFR BLD AUTO: 70.7 %
NITRITE URINE: NEGATIVE
PH URINE: 6
PHOSPHATE SERPL-MCNC: 3.1 MG/DL
PLATELET # BLD AUTO: NORMAL K/UL
POTASSIUM SERPL-SCNC: 5.1 MMOL/L
PROT SERPL-MCNC: 7 G/DL
PROT UR-MCNC: 37 MG/DL
PROTEIN URINE: ABNORMAL
RBC # BLD: 2.94 M/UL
RBC # FLD: 17.7 %
RED BLOOD CELLS URINE: 3 /HPF
SODIUM SERPL-SCNC: 139 MMOL/L
SPECIFIC GRAVITY URINE: 1.01
SQUAMOUS EPITHELIAL CELLS: 1 /HPF
TACROLIMUS SERPL-MCNC: 5.3 NG/ML
TIBC SERPL-MCNC: 227 UG/DL
UIBC SERPL-MCNC: 185 UG/DL
URATE SERPL-MCNC: 6.4 MG/DL
UROBILINOGEN URINE: NORMAL
VIT B12 SERPL-MCNC: 802 PG/ML
WBC # FLD AUTO: 7.9 K/UL
WHITE BLOOD CELLS URINE: 351 /HPF

## 2019-06-24 LAB — BKV DNA SPEC QL NAA+PROBE: NOT DETECTED COPIES/ML

## 2019-07-11 ENCOUNTER — APPOINTMENT (OUTPATIENT)
Dept: NEPHROLOGY | Facility: CLINIC | Age: 69
End: 2019-07-11
Payer: MEDICARE

## 2019-07-11 VITALS
HEIGHT: 71 IN | HEART RATE: 105 BPM | WEIGHT: 210 LBS | BODY MASS INDEX: 29.4 KG/M2 | SYSTOLIC BLOOD PRESSURE: 145 MMHG | DIASTOLIC BLOOD PRESSURE: 79 MMHG | TEMPERATURE: 98.4 F

## 2019-07-11 PROCEDURE — 99214 OFFICE O/P EST MOD 30 MIN: CPT

## 2019-07-11 NOTE — PHYSICAL EXAM
[General Appearance - Alert] : alert [General Appearance - Well Nourished] : well nourished [General Appearance - In No Acute Distress] : in no acute distress [General Appearance - Well Developed] : well developed [General Appearance - Well-Appearing] : healthy appearing [Outer Ear] : the ears and nose were normal in appearance [Extraocular Movements] : extraocular movements were intact [Sclera] : the sclera and conjunctiva were normal [Neck Cervical Mass (___cm)] : no neck mass was observed [Neck Appearance] : the appearance of the neck was normal [Jugular Venous Distention Increased] : there was no jugular-venous distention [Respiration, Rhythm And Depth] : normal respiratory rhythm and effort [Exaggerated Use Of Accessory Muscles For Inspiration] : no accessory muscle use [Auscultation Breath Sounds / Voice Sounds] : lungs were clear to auscultation bilaterally [Heart Rate And Rhythm] : heart rate was normal and rhythm regular [Heart Sounds] : normal S1 and S2 [Edema] : there was no peripheral edema [Heart Sounds Gallop] : no gallops [Bowel Sounds] : normal bowel sounds [Abdomen Tenderness] : non-tender [Abdomen Soft] : soft [] : no hepato-splenomegaly [Cervical Lymph Nodes Enlarged Posterior Bilaterally] : posterior cervical [Cervical Lymph Nodes Enlarged Anterior Bilaterally] : anterior cervical [Supraclavicular Lymph Nodes Enlarged Bilaterally] : supraclavicular [No CVA Tenderness] : no ~M costovertebral angle tenderness [Nail Clubbing] : no clubbing  or cyanosis of the fingernails [Musculoskeletal - Swelling] : no joint swelling seen [Abnormal Walk] : normal gait [Skin Color & Pigmentation] : normal skin color and pigmentation [___ (cm) Fistula] : [unfilled] (cm) fistula [Sensation] : the sensory exam was normal to light touch and pinprick [Cranial Nerves] : cranial nerves 2-12 were intact [Skin Turgor] : normal skin turgor [Oriented To Time, Place, And Person] : oriented to person, place, and time [FreeTextEntry1] : + ventral hernia, reducible.

## 2019-07-11 NOTE — HISTORY OF PRESENT ILLNESS
[FreeTextEntry1] : Mr. Medrano is a 67 yrs old male ESRD secondary to DM2 (HD since 2015, followed by Dr. Bonilla) s/p  donor kidney transplant on 18.\par HCV donor. 40 yrs old male. cold ischemia time 23 hrs. delayed graft function. \par \par PT was readmitted for anemia and hematoma.  Required 2 kidney biopsies which revealed few oxalate crystals and borderline rejection for which he was treated with solumedrol 375 and prednisone taper.  \par \par Interval events:\par Pt readmitted to hospital with CHELA.  since then creatinine has been improving.  He feels ok.  Still with chronic diarrhea.  Weight stable.  Checks blood glucose at home - per pt well controlled.

## 2019-07-11 NOTE — ASSESSMENT
[FreeTextEntry1] : 1.  Kidney transplantation - Pts aaron creatinine 1.9 but had CHELA with pyelonephritis. Has had multiple episodes of CHELA.  Creatinine last visit 2.9 - slowly downtrending.    \par Last biopsy revealed borderline rejection and oxalate crystals which was treated with steroids.  Nephrostomy and JJ stent have been removed. No hydronephrosis in hospital sonograms. \par 2.  Immunosuppression - simulect induction, tacrolimus target 5-7, held MMF due to recent infections and diarrhea. \par 3.  DM2 - on januvia and glimepiride.  BG controlled at home. \par 4.  HTN - controlled.  \par 5.  HCV - genotype 3a.  completed Epclusa.  Last vl note detected.  \par 6.  Anemia - due to CKD/CHELA - received aranesp from his hematologist few weeks ago.   \par 7.  Chronic diarrhea - on and off on lomotil prn.  Has rectal fissure.  Has seen GI and had a colonoscopy.   Has history of small bowel resection which may be the cause of diarrhea.  \par 8.  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  \par 9.  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  Will repeat today.  Cont valcyte 450mgs daily (renal dosing).  \par \par Pt will return for f/u in 4 weeks for labs and 2 months for visit.  \par Incisional hernia in abdomen increasing - will send to surgery for assessment.  \par He will also see Dr. Bonilla.  \par

## 2019-07-11 NOTE — CONSULT LETTER
[Please see my note below.] : Please see my note below. [Courtesy Letter:] : I had the pleasure of seeing your patient, [unfilled], in my office today. [Dear  ___] : Dear  [unfilled], [Sincerely,] : Sincerely, [FreeTextEntry3] : Arian Bhakta, DO

## 2019-07-12 LAB
ALBUMIN SERPL ELPH-MCNC: 4.2 G/DL
ALP BLD-CCNC: 83 U/L
ALT SERPL-CCNC: 29 U/L
ANION GAP SERPL CALC-SCNC: 12 MMOL/L
APPEARANCE: ABNORMAL
AST SERPL-CCNC: 26 U/L
BACTERIA: ABNORMAL
BASOPHILS # BLD AUTO: 0.04 K/UL
BASOPHILS NFR BLD AUTO: 0.6 %
BILIRUB SERPL-MCNC: 0.2 MG/DL
BILIRUBIN URINE: NEGATIVE
BLOOD URINE: ABNORMAL
BUN SERPL-MCNC: 37 MG/DL
CALCIUM SERPL-MCNC: 10.2 MG/DL
CALCIUM SERPL-MCNC: 10.2 MG/DL
CHLORIDE SERPL-SCNC: 104 MMOL/L
CO2 SERPL-SCNC: 22 MMOL/L
COLOR: NORMAL
CREAT SERPL-MCNC: 3.06 MG/DL
CREAT SPEC-SCNC: 112 MG/DL
CREAT/PROT UR: 0.2 RATIO
EOSINOPHIL # BLD AUTO: 0.04 K/UL
EOSINOPHIL NFR BLD AUTO: 0.6 %
GLUCOSE QUALITATIVE U: NEGATIVE
GLUCOSE SERPL-MCNC: 160 MG/DL
HBV SURFACE AG SER QL: NONREACTIVE
HCT VFR BLD CALC: 30.1 %
HCV AB SER QL: NONREACTIVE
HCV RNA SERPL NAA DL=5-ACNC: NOT DETECTED IU/ML
HCV RNA SERPL NAA+PROBE-LOG IU: NOT DETECTED LOGIU/ML
HCV S/CO RATIO: 0.09 S/CO
HGB BLD-MCNC: 9.5 G/DL
HIV1 RNA # SERPL NAA+PROBE: NORMAL
HIV1 RNA # SERPL NAA+PROBE: NORMAL COPIES/ML
HIV1+2 AB SPEC QL IA.RAPID: NONREACTIVE
HYALINE CASTS: 2 /LPF
IMM GRANULOCYTES NFR BLD AUTO: 1.6 %
KETONES URINE: NEGATIVE
LDH SERPL-CCNC: 221 U/L
LEUKOCYTE ESTERASE URINE: ABNORMAL
LYMPHOCYTES # BLD AUTO: 1.55 K/UL
LYMPHOCYTES NFR BLD AUTO: 24.5 %
MAGNESIUM SERPL-MCNC: 1.7 MG/DL
MAN DIFF?: NORMAL
MCHC RBC-ENTMCNC: 31.6 GM/DL
MCHC RBC-ENTMCNC: 31.7 PG
MCV RBC AUTO: 100.3 FL
MICROSCOPIC-UA: NORMAL
MONOCYTES # BLD AUTO: 0.52 K/UL
MONOCYTES NFR BLD AUTO: 8.2 %
NEUTROPHILS # BLD AUTO: 4.08 K/UL
NEUTROPHILS NFR BLD AUTO: 64.5 %
NITRITE URINE: NEGATIVE
PARATHYROID HORMONE INTACT: 72 PG/ML
PH URINE: 6
PHOSPHATE SERPL-MCNC: 3.4 MG/DL
PLATELET # BLD AUTO: 123 K/UL
POTASSIUM SERPL-SCNC: 5.5 MMOL/L
PROT SERPL-MCNC: 7.5 G/DL
PROT UR-MCNC: 26 MG/DL
PROTEIN URINE: NORMAL
RBC # BLD: 3 M/UL
RBC # FLD: 17.2 %
RED BLOOD CELLS URINE: 1 /HPF
SODIUM SERPL-SCNC: 138 MMOL/L
SPECIFIC GRAVITY URINE: 1.01
SQUAMOUS EPITHELIAL CELLS: 1 /HPF
TACROLIMUS SERPL-MCNC: 5 NG/ML
URATE SERPL-MCNC: 7.1 MG/DL
UROBILINOGEN URINE: NORMAL
VIRAL LOAD INTERP: NORMAL
VIRAL LOAD LOG: NORMAL LG COP/ML
WBC # FLD AUTO: 6.33 K/UL
WHITE BLOOD CELLS URINE: 110 /HPF

## 2019-07-15 ENCOUNTER — OTHER (OUTPATIENT)
Age: 69
End: 2019-07-15

## 2019-07-16 LAB
BKV DNA SPEC QL NAA+PROBE: NOT DETECTED COPIES/ML
CMV DNA SPEC QL NAA+PROBE: NOT DETECTED
CMVPCR LOG: NOT DETECTED LOGIU/ML
HBV DNA # SERPL NAA+PROBE: NOT DETECTED IU/ML
HEPB DNA PCR LOG: NOT DETECTED LOGIU/ML

## 2019-07-22 ENCOUNTER — FORM ENCOUNTER (OUTPATIENT)
Age: 69
End: 2019-07-22

## 2019-07-23 ENCOUNTER — APPOINTMENT (OUTPATIENT)
Dept: ULTRASOUND IMAGING | Facility: HOSPITAL | Age: 69
End: 2019-07-23
Payer: MEDICARE

## 2019-07-23 ENCOUNTER — RESULT REVIEW (OUTPATIENT)
Age: 69
End: 2019-07-23

## 2019-07-23 ENCOUNTER — OUTPATIENT (OUTPATIENT)
Dept: OUTPATIENT SERVICES | Facility: HOSPITAL | Age: 69
LOS: 1 days | End: 2019-07-23
Payer: MEDICARE

## 2019-07-23 DIAGNOSIS — Z94.0 KIDNEY TRANSPLANT STATUS: ICD-10-CM

## 2019-07-23 DIAGNOSIS — Z98.890 OTHER SPECIFIED POSTPROCEDURAL STATES: Chronic | ICD-10-CM

## 2019-07-23 DIAGNOSIS — I77.0 ARTERIOVENOUS FISTULA, ACQUIRED: Chronic | ICD-10-CM

## 2019-07-23 DIAGNOSIS — Z90.5 ACQUIRED ABSENCE OF KIDNEY: Chronic | ICD-10-CM

## 2019-07-23 DIAGNOSIS — Z94.0 KIDNEY TRANSPLANT STATUS: Chronic | ICD-10-CM

## 2019-07-23 DIAGNOSIS — Z00.00 ENCOUNTER FOR GENERAL ADULT MEDICAL EXAMINATION WITHOUT ABNORMAL FINDINGS: ICD-10-CM

## 2019-07-23 PROCEDURE — 76942 ECHO GUIDE FOR BIOPSY: CPT

## 2019-07-23 PROCEDURE — 76942 ECHO GUIDE FOR BIOPSY: CPT | Mod: 26

## 2019-07-23 PROCEDURE — 85730 THROMBOPLASTIN TIME PARTIAL: CPT

## 2019-07-23 PROCEDURE — 88350 IMFLUOR EA ADDL 1ANTB STN PX: CPT | Mod: 26

## 2019-07-23 PROCEDURE — 88350 IMFLUOR EA ADDL 1ANTB STN PX: CPT

## 2019-07-23 PROCEDURE — 50200 RENAL BIOPSY PERQ: CPT

## 2019-07-23 PROCEDURE — 50200 RENAL BIOPSY PERQ: CPT | Mod: RT

## 2019-07-23 PROCEDURE — 88346 IMFLUOR 1ST 1ANTB STAIN PX: CPT

## 2019-07-23 PROCEDURE — 88305 TISSUE EXAM BY PATHOLOGIST: CPT

## 2019-07-23 PROCEDURE — 88346 IMFLUOR 1ST 1ANTB STAIN PX: CPT | Mod: 26

## 2019-07-23 PROCEDURE — 88342 IMHCHEM/IMCYTCHM 1ST ANTB: CPT | Mod: 26

## 2019-07-23 PROCEDURE — 88341 IMHCHEM/IMCYTCHM EA ADD ANTB: CPT

## 2019-07-23 PROCEDURE — 85610 PROTHROMBIN TIME: CPT

## 2019-07-23 PROCEDURE — 88312 SPECIAL STAINS GROUP 1: CPT | Mod: 26

## 2019-07-23 PROCEDURE — 88313 SPECIAL STAINS GROUP 2: CPT

## 2019-07-23 PROCEDURE — 88305 TISSUE EXAM BY PATHOLOGIST: CPT | Mod: 26

## 2019-07-23 PROCEDURE — 88312 SPECIAL STAINS GROUP 1: CPT

## 2019-07-23 PROCEDURE — 88348 ELECTRON MICROSCOPY DX: CPT

## 2019-07-23 PROCEDURE — 88313 SPECIAL STAINS GROUP 2: CPT | Mod: 26

## 2019-07-23 PROCEDURE — 88348 ELECTRON MICROSCOPY DX: CPT | Mod: 26

## 2019-07-31 ENCOUNTER — LABORATORY RESULT (OUTPATIENT)
Age: 69
End: 2019-07-31

## 2019-07-31 ENCOUNTER — APPOINTMENT (OUTPATIENT)
Dept: TRANSPLANT | Facility: CLINIC | Age: 69
End: 2019-07-31

## 2019-08-02 ENCOUNTER — CHART COPY (OUTPATIENT)
Age: 69
End: 2019-08-02

## 2019-08-02 LAB
ALBUMIN SERPL ELPH-MCNC: 4.1 G/DL
ALP BLD-CCNC: 87 U/L
ALT SERPL-CCNC: 19 U/L
ANION GAP SERPL CALC-SCNC: 11 MMOL/L
APPEARANCE: ABNORMAL
AST SERPL-CCNC: 25 U/L
BACTERIA: ABNORMAL
BASOPHILS # BLD AUTO: 0.05 K/UL
BASOPHILS NFR BLD AUTO: 0.7 %
BILIRUB SERPL-MCNC: 0.2 MG/DL
BILIRUBIN URINE: NEGATIVE
BKV DNA SPEC QL NAA+PROBE: NOT DETECTED COPIES/ML
BLOOD URINE: ABNORMAL
BUN SERPL-MCNC: 39 MG/DL
CALCIUM SERPL-MCNC: 9.9 MG/DL
CHLORIDE SERPL-SCNC: 105 MMOL/L
CMV DNA SPEC QL NAA+PROBE: NOT DETECTED
CMVPCR LOG: NOT DETECTED LOGIU/ML
CO2 SERPL-SCNC: 24 MMOL/L
COLOR: YELLOW
CREAT SERPL-MCNC: 3.04 MG/DL
CREAT SPEC-SCNC: 154 MG/DL
CREAT/PROT UR: 1.5 RATIO
EOSINOPHIL # BLD AUTO: 0.05 K/UL
EOSINOPHIL NFR BLD AUTO: 0.7 %
GLUCOSE QUALITATIVE U: NEGATIVE
GLUCOSE SERPL-MCNC: 148 MG/DL
HCT VFR BLD CALC: 32.7 %
HGB BLD-MCNC: 9.8 G/DL
HYALINE CASTS: 0 /LPF
IMM GRANULOCYTES NFR BLD AUTO: 1.6 %
KETONES URINE: NEGATIVE
LDH SERPL-CCNC: 219 U/L
LEUKOCYTE ESTERASE URINE: ABNORMAL
LYMPHOCYTES # BLD AUTO: 2.14 K/UL
LYMPHOCYTES NFR BLD AUTO: 31.8 %
MAGNESIUM SERPL-MCNC: 1.9 MG/DL
MAN DIFF?: NORMAL
MCHC RBC-ENTMCNC: 30 GM/DL
MCHC RBC-ENTMCNC: 32.2 PG
MCV RBC AUTO: 107.6 FL
MICROSCOPIC-UA: NORMAL
MONOCYTES # BLD AUTO: 0.52 K/UL
MONOCYTES NFR BLD AUTO: 7.7 %
NEUTROPHILS # BLD AUTO: 3.85 K/UL
NEUTROPHILS NFR BLD AUTO: 57.5 %
NITRITE URINE: NEGATIVE
PH URINE: 6
PHOSPHATE SERPL-MCNC: 2.9 MG/DL
PLATELET # BLD AUTO: 189 K/UL
POTASSIUM SERPL-SCNC: 5.1 MMOL/L
PROT SERPL-MCNC: 7.4 G/DL
PROT UR-MCNC: 238 MG/DL
PROTEIN URINE: ABNORMAL
RBC # BLD: 3.04 M/UL
RBC # FLD: 16 %
RED BLOOD CELLS URINE: 11 /HPF
SODIUM SERPL-SCNC: 140 MMOL/L
SPECIFIC GRAVITY URINE: 1.02
SQUAMOUS EPITHELIAL CELLS: 1 /HPF
TACROLIMUS SERPL-MCNC: 5.1 NG/ML
URATE SERPL-MCNC: 7.4 MG/DL
URINE COMMENTS: NORMAL
UROBILINOGEN URINE: NORMAL
WBC # FLD AUTO: 6.72 K/UL
WHITE BLOOD CELLS URINE: 426 /HPF

## 2019-08-02 RX ORDER — VALGANCICLOVIR HYDROCHLORIDE 450 MG/1
450 TABLET ORAL
Refills: 0 | Status: DISCONTINUED | COMMUNITY
Start: 2019-04-24 | End: 2019-08-02

## 2019-08-05 LAB — BACTERIA UR CULT: ABNORMAL

## 2019-08-08 ENCOUNTER — APPOINTMENT (OUTPATIENT)
Dept: TRANSPLANT | Facility: CLINIC | Age: 69
End: 2019-08-08

## 2019-08-09 ENCOUNTER — INPATIENT (INPATIENT)
Facility: HOSPITAL | Age: 69
LOS: 1 days | Discharge: HOME | DRG: 700 | End: 2019-08-11
Attending: TRANSPLANT SURGERY | Admitting: TRANSPLANT SURGERY
Payer: MEDICARE

## 2019-08-09 VITALS
HEART RATE: 95 BPM | WEIGHT: 205.03 LBS | RESPIRATION RATE: 18 BRPM | TEMPERATURE: 100 F | OXYGEN SATURATION: 99 % | HEIGHT: 70 IN | DIASTOLIC BLOOD PRESSURE: 84 MMHG | SYSTOLIC BLOOD PRESSURE: 162 MMHG

## 2019-08-09 DIAGNOSIS — Z90.5 ACQUIRED ABSENCE OF KIDNEY: Chronic | ICD-10-CM

## 2019-08-09 DIAGNOSIS — Z98.890 OTHER SPECIFIED POSTPROCEDURAL STATES: Chronic | ICD-10-CM

## 2019-08-09 DIAGNOSIS — T86.91 UNSPECIFIED TRANSPLANTED ORGAN AND TISSUE REJECTION: ICD-10-CM

## 2019-08-09 DIAGNOSIS — I77.0 ARTERIOVENOUS FISTULA, ACQUIRED: Chronic | ICD-10-CM

## 2019-08-09 DIAGNOSIS — Z94.0 KIDNEY TRANSPLANT STATUS: Chronic | ICD-10-CM

## 2019-08-09 LAB
ALBUMIN SERPL ELPH-MCNC: 4 G/DL
ALP BLD-CCNC: 83 U/L
ALT SERPL-CCNC: 27 U/L
ANION GAP SERPL CALC-SCNC: 13 MMOL/L
APPEARANCE: CLEAR
AST SERPL-CCNC: 28 U/L
BACTERIA: NEGATIVE
BASOPHILS # BLD AUTO: 0 K/UL — SIGNIFICANT CHANGE UP (ref 0–0.2)
BASOPHILS # BLD AUTO: 0.06 K/UL
BASOPHILS NFR BLD AUTO: 0.2 % — SIGNIFICANT CHANGE UP (ref 0–2)
BASOPHILS NFR BLD AUTO: 0.9 %
BILIRUB SERPL-MCNC: 0.2 MG/DL
BILIRUBIN URINE: NEGATIVE
BLOOD URINE: ABNORMAL
BUN SERPL-MCNC: 39 MG/DL
CALCIUM SERPL-MCNC: 10 MG/DL
CHLORIDE SERPL-SCNC: 107 MMOL/L
CO2 SERPL-SCNC: 24 MMOL/L
COLOR: NORMAL
CREAT SERPL-MCNC: 3.4 MG/DL
CREAT SPEC-SCNC: 168 MG/DL
CREAT/PROT UR: 0.2 RATIO
EOSINOPHIL # BLD AUTO: 0.09 K/UL
EOSINOPHIL # BLD AUTO: 0.1 K/UL — SIGNIFICANT CHANGE UP (ref 0–0.5)
EOSINOPHIL NFR BLD AUTO: 1.3 %
EOSINOPHIL NFR BLD AUTO: 1.3 % — SIGNIFICANT CHANGE UP (ref 0–6)
GLUCOSE QUALITATIVE U: NEGATIVE
GLUCOSE SERPL-MCNC: 147 MG/DL
HBV CORE IGG+IGM SER QL: NONREACTIVE
HBV SURFACE AG SER QL: NONREACTIVE
HCT VFR BLD CALC: 27.8 % — LOW (ref 39–50)
HCT VFR BLD CALC: 31.6 %
HCV AB SER QL: NONREACTIVE
HCV RNA SERPL NAA DL=5-ACNC: NOT DETECTED IU/ML
HCV RNA SERPL NAA+PROBE-LOG IU: NOT DETECTED LOGIU/ML
HCV S/CO RATIO: 0.12 S/CO
HGB BLD-MCNC: 9 G/DL — LOW (ref 13–17)
HGB BLD-MCNC: 9.6 G/DL
HIV1 RNA # SERPL NAA+PROBE: NORMAL
HIV1 RNA # SERPL NAA+PROBE: NORMAL COPIES/ML
HIV1+2 AB SPEC QL IA.RAPID: NONREACTIVE
HYALINE CASTS: 2 /LPF
IMM GRANULOCYTES NFR BLD AUTO: 2.2 %
KETONES URINE: NEGATIVE
LDH SERPL-CCNC: 212 U/L
LEUKOCYTE ESTERASE URINE: ABNORMAL
LYMPHOCYTES # BLD AUTO: 1.99 K/UL
LYMPHOCYTES # BLD AUTO: 2.9 K/UL — SIGNIFICANT CHANGE UP (ref 1–3.3)
LYMPHOCYTES # BLD AUTO: 36 % — SIGNIFICANT CHANGE UP (ref 13–44)
LYMPHOCYTES NFR BLD AUTO: 28.8 %
MAGNESIUM SERPL-MCNC: 1.9 MG/DL
MAN DIFF?: NORMAL
MCHC RBC-ENTMCNC: 30.4 GM/DL
MCHC RBC-ENTMCNC: 31.9 PG
MCHC RBC-ENTMCNC: 32.6 GM/DL — SIGNIFICANT CHANGE UP (ref 32–36)
MCHC RBC-ENTMCNC: 33.1 PG — SIGNIFICANT CHANGE UP (ref 27–34)
MCV RBC AUTO: 101 FL — HIGH (ref 80–100)
MCV RBC AUTO: 105 FL
MICROSCOPIC-UA: NORMAL
MONOCYTES # BLD AUTO: 0.7 K/UL
MONOCYTES # BLD AUTO: 1 K/UL — HIGH (ref 0–0.9)
MONOCYTES NFR BLD AUTO: 10.1 %
MONOCYTES NFR BLD AUTO: 12 % — SIGNIFICANT CHANGE UP (ref 2–14)
NEUTROPHILS # BLD AUTO: 3.93 K/UL
NEUTROPHILS # BLD AUTO: 4 K/UL — SIGNIFICANT CHANGE UP (ref 1.8–7.4)
NEUTROPHILS NFR BLD AUTO: 50.5 % — SIGNIFICANT CHANGE UP (ref 43–77)
NEUTROPHILS NFR BLD AUTO: 56.7 %
NITRITE URINE: NEGATIVE
PH URINE: 6
PHOSPHATE SERPL-MCNC: 4.1 MG/DL
PLATELET # BLD AUTO: 172 K/UL — SIGNIFICANT CHANGE UP (ref 150–400)
PLATELET # BLD AUTO: 176 K/UL
POTASSIUM SERPL-SCNC: 5.2 MMOL/L
PROT SERPL-MCNC: 7.4 G/DL
PROT UR-MCNC: 28 MG/DL
PROTEIN URINE: ABNORMAL
RBC # BLD: 2.74 M/UL — LOW (ref 4.2–5.8)
RBC # BLD: 3.01 M/UL
RBC # FLD: 15.5 % — HIGH (ref 10.3–14.5)
RBC # FLD: 15.9 %
RED BLOOD CELLS URINE: 4 /HPF
SODIUM SERPL-SCNC: 144 MMOL/L
SPECIFIC GRAVITY URINE: 1.02
SQUAMOUS EPITHELIAL CELLS: 6 /HPF
TACROLIMUS SERPL-MCNC: 6 NG/ML
UROBILINOGEN URINE: NORMAL
VIRAL LOAD INTERP: NORMAL
VIRAL LOAD LOG: NORMAL LG COP/ML
WBC # BLD: 8 K/UL — SIGNIFICANT CHANGE UP (ref 3.8–10.5)
WBC # FLD AUTO: 6.92 K/UL
WBC # FLD AUTO: 8 K/UL — SIGNIFICANT CHANGE UP (ref 3.8–10.5)
WHITE BLOOD CELLS URINE: 20 /HPF

## 2019-08-09 PROCEDURE — 99222 1ST HOSP IP/OBS MODERATE 55: CPT | Mod: GC

## 2019-08-09 RX ORDER — TAMSULOSIN HYDROCHLORIDE 0.4 MG/1
0.4 CAPSULE ORAL AT BEDTIME
Refills: 0 | Status: DISCONTINUED | OUTPATIENT
Start: 2019-08-09 | End: 2019-08-11

## 2019-08-09 RX ORDER — TACROLIMUS 5 MG/1
3 CAPSULE ORAL ONCE
Refills: 0 | Status: COMPLETED | OUTPATIENT
Start: 2019-08-09 | End: 2019-08-09

## 2019-08-09 RX ORDER — TACROLIMUS 5 MG/1
3 CAPSULE ORAL EVERY 12 HOURS
Refills: 0 | Status: DISCONTINUED | OUTPATIENT
Start: 2019-08-09 | End: 2019-08-10

## 2019-08-09 RX ADMIN — TAMSULOSIN HYDROCHLORIDE 0.4 MILLIGRAM(S): 0.4 CAPSULE ORAL at 22:18

## 2019-08-09 RX ADMIN — TACROLIMUS 3 MILLIGRAM(S): 5 CAPSULE ORAL at 22:17

## 2019-08-09 NOTE — H&P ADULT - HISTORY OF PRESENT ILLNESS
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 He was started on Valcyte 4/19 as outpatient for +CMV. Recently admitted in 05/2018 w/ severe CHELA 2/2 FK toxicity (level on admission was 39), +UA, was treated w/ cefepime. Patient now w/ uptredning Cr on outpatient labs (3.4), being directly admitted for rejection treatment.

## 2019-08-09 NOTE — H&P ADULT - NSHPPHYSICALEXAM_GEN_ALL_CORE
Constitutional: Well developed / well nourished  Eyes: Anicteric, PERRLA  ENMT: nc/at  Neck: supple   Respiratory: CTA B/L  Cardiovascular: RRR  Gastrointestinal: Soft abdomen, mild tender, nondistended  Genitourinary: Voiding spontaneously  Extremities: SCD's in place and working bilaterally  Vascular: Palpable dp pulses bilaterally  Neurological: A&O x3  Skin: warm, dry, intact throughout   Musculoskeletal: Moving all extremities  Psychiatric: Responsive

## 2019-08-09 NOTE — H&P ADULT - NSICDXPASTSURGICALHX_GEN_ALL_CORE_FT
PAST SURGICAL HISTORY:  AV fistula 2013/ left forearm    H/O ileostomy 2017   for 3 months. s/p Reversal    Kidney transplant recipient 2018  @ [facility] :  +  Hep C Donor    S/p nephrectomy left 9/15/2015   + Cancer    S/p nephrectomy right 12/10/2015   benign    S/P right knee arthroscopy

## 2019-08-09 NOTE — H&P ADULT - ASSESSMENT
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         Plan:  Pules dose steroids 250mg tonight  Daily labs   Trend Cr  SCDs  ISS  restart home meds for HTN

## 2019-08-09 NOTE — PATIENT PROFILE ADULT - NSPROHMSYMPCOND_GEN_A_NUR
diabetes
Please follow up with your pediatrician 1-2 days after discharge.    Head Injury, Pediatric  There are many types of head injuries. Head injuries can be as minor as a bump, or they can be more severe. More severe head injuries include:    A jarring injury to the brain (concussion).  A bruise of the brain (contusion). This means there is bleeding in the brain that can cause swelling.  A cracked skull (skull fracture).  Bleeding in the brain that collects, clots, and forms a bump (hematoma).    After a head injury, your child may need to be observed for a while in the emergency department or urgent care. Sometimes admission to the hospital is needed.    ImageAfter a head injury has happened, most problems occur within the first 24 hours, but side effects may occur up to 7–10 days after the injury. It is important to watch your child's condition for any changes.    What are the causes?  There are many possible causes of a head injury. In younger children, head injury from abuse or falls is the most common. In older children, falls, bicycle injuries, sports accidents, and car accidents (motor vehicle collisions) are common causes of head injury.    What are the symptoms?  There are many possible symptoms of a head injury. Visible symptoms of a head injury include a bruise, bump, or bleeding at the site of the injury. Other non-visible symptoms include:    Trouble being awakened.  Fainting.  Seizures.  Headache.  Dizziness.  Nausea or vomiting.  Confusion.  Memory problems.    Other possible symptoms that may develop after the head injury include:    Poor attention and concentration.  Fatigue or tiring easily.  Problems walking or losing balance.  Irritability or crying more often.  Being uncomfortable around bright lights or loud noises.  Anxiety or depression.  Losing a learned skill, such as toilet training or reading.  Changes in eating or sleeping habits.    How is this diagnosed?  This condition can usually be diagnosed based on your child's symptoms, a description of the injury, and a physical exam. Your child may also have imaging tests done, such as a CT scan or MRI. Your child will also be closely watched.    How is this treated?  Treatment for this condition depends on the severity and type of injury your child has. The main goal of treatment is to prevent complications and allow the brain time to heal.    For mild head injury, your child may be sent home and treatment may include:    Observation and checking on your child often.  Physical rest.  Brain rest.  Pain medicines.    For severe brain injury, treatment may include:    Close observation. This includes hospitalization with frequent physical exams.  Pain medicines.  Breathing support. This may include using a ventilator.  Managing the pressure inside the brain (intracranial pressure or ICP) by:    Monitoring the ICP.  Giving medicines to decrease the ICP.  Positioning your child to decrease the ICP.    Medicine to prevent seizures.  Surgery to stop bleeding or to remove blood clots (craniotomy).  Surgery to remove part of the skull (decompressive craniectomy). This allows room for the brain to swell.    Follow these instructions at home:  Medicines     Give over-the-counter and prescription medicines only as told by your child's health care provider.  Do not give your child aspirin because of the association with Reye syndrome.  Activity     Encourage your child to rest as much as possible and avoid activities that are physically hard or tiring. Rest helps the brain to heal.  Make sure your child gets enough sleep.  Limit activities that require a lot of thought or attention, such as:    Watching TV.  Playing memory games and puzzles.  Doing homework.  Working on the computer, social media, and texting.    Having another head injury, especially before the first one has healed, can be dangerous. Keep your child from activities that could cause another head injury, such as:    Riding a bicycle.  Playing sports.  Participating in gym class or recess.  Climbing on playground equipment.    Ask your child's health care provider when it is safe for your child to return to his or her regular activities. Ask your child's health care provider for a step-by-step plan for your child to slowly go back to activities.  General instructions     Watch your child carefully for new or worsening symptoms. This is very important in the first 24 hours after the head injury.  Keep all follow-up visits as told by your child's health care provider. This is important.  Tell all of your child's teachers and other caregivers about your child's injury, symptoms, and activity restrictions. Have them report any new or worsening problems.  How is this prevented?  Your child should:    Wear a seatbelt when he or she is in a moving vehicle.  Use the appropriate-sized car seat or booster seat when in a moving vehicle.  Wear a helmet when riding a bicycle, skiing, or doing any other sport or activity that has a risk of injury.    You can:    Make your living areas safer for your child.    Childproof any dangerous parts of your home.  Install window guards and safety angelo.    Make sure the playground that your child uses is safe.    Get help right away if:  Your child has:    A severe headache that is not helped by medicine.  Clear or bloody fluid coming from his or her nose or ears.  Changes in his or her vision.  A seizure.    Your child's symptoms get worse.  Your child vomits.  Your child's dizziness gets worse.  Your child cannot walk or does not have control over his or her arms or legs.  Your child will not stop crying.  Your child passes out.  You cannot wake up your child.  Your child is sleepier and has trouble staying awake.  Your child will not eat or nurse.  Your child's pupils change size.  These symptoms may represent a serious problem that is an emergency. Do not wait to see if the symptoms will go away. Get medical help right away. Call your local emergency services (911 in the [place]

## 2019-08-10 DIAGNOSIS — T86.11 KIDNEY TRANSPLANT REJECTION: ICD-10-CM

## 2019-08-10 LAB
ALBUMIN SERPL ELPH-MCNC: 3.6 G/DL — SIGNIFICANT CHANGE UP (ref 3.3–5)
ALBUMIN SERPL ELPH-MCNC: 3.8 G/DL — SIGNIFICANT CHANGE UP (ref 3.3–5)
ALP SERPL-CCNC: 78 U/L — SIGNIFICANT CHANGE UP (ref 40–120)
ALP SERPL-CCNC: 86 U/L — SIGNIFICANT CHANGE UP (ref 40–120)
ALT FLD-CCNC: 28 U/L — SIGNIFICANT CHANGE UP (ref 10–45)
ALT FLD-CCNC: 31 U/L — SIGNIFICANT CHANGE UP (ref 10–45)
ANION GAP SERPL CALC-SCNC: 11 MMOL/L — SIGNIFICANT CHANGE UP (ref 5–17)
ANION GAP SERPL CALC-SCNC: 14 MMOL/L — SIGNIFICANT CHANGE UP (ref 5–17)
ANION GAP SERPL CALC-SCNC: 14 MMOL/L — SIGNIFICANT CHANGE UP (ref 5–17)
ANION GAP SERPL CALC-SCNC: 15 MMOL/L — SIGNIFICANT CHANGE UP (ref 5–17)
APPEARANCE UR: CLEAR — SIGNIFICANT CHANGE UP
APPEARANCE UR: CLEAR — SIGNIFICANT CHANGE UP
APTT BLD: 30.7 SEC — SIGNIFICANT CHANGE UP (ref 27.5–36.3)
AST SERPL-CCNC: 26 U/L — SIGNIFICANT CHANGE UP (ref 10–40)
AST SERPL-CCNC: 28 U/L — SIGNIFICANT CHANGE UP (ref 10–40)
BACTERIA # UR AUTO: NEGATIVE — SIGNIFICANT CHANGE UP
BILIRUB SERPL-MCNC: 0.1 MG/DL — LOW (ref 0.2–1.2)
BILIRUB SERPL-MCNC: 0.2 MG/DL — SIGNIFICANT CHANGE UP (ref 0.2–1.2)
BILIRUB UR-MCNC: NEGATIVE — SIGNIFICANT CHANGE UP
BILIRUB UR-MCNC: NEGATIVE — SIGNIFICANT CHANGE UP
BLD GP AB SCN SERPL QL: NEGATIVE — SIGNIFICANT CHANGE UP
BUN SERPL-MCNC: 36 MG/DL — HIGH (ref 7–23)
BUN SERPL-MCNC: 37 MG/DL — HIGH (ref 7–23)
BUN SERPL-MCNC: 42 MG/DL — HIGH (ref 7–23)
BUN SERPL-MCNC: 44 MG/DL — HIGH (ref 7–23)
CALCIUM SERPL-MCNC: 9.5 MG/DL — SIGNIFICANT CHANGE UP (ref 8.4–10.5)
CALCIUM SERPL-MCNC: 9.5 MG/DL — SIGNIFICANT CHANGE UP (ref 8.4–10.5)
CALCIUM SERPL-MCNC: 9.6 MG/DL — SIGNIFICANT CHANGE UP (ref 8.4–10.5)
CALCIUM SERPL-MCNC: 9.8 MG/DL — SIGNIFICANT CHANGE UP (ref 8.4–10.5)
CHLORIDE SERPL-SCNC: 102 MMOL/L — SIGNIFICANT CHANGE UP (ref 96–108)
CHLORIDE SERPL-SCNC: 104 MMOL/L — SIGNIFICANT CHANGE UP (ref 96–108)
CHLORIDE SERPL-SCNC: 104 MMOL/L — SIGNIFICANT CHANGE UP (ref 96–108)
CHLORIDE SERPL-SCNC: 106 MMOL/L — SIGNIFICANT CHANGE UP (ref 96–108)
CO2 SERPL-SCNC: 19 MMOL/L — LOW (ref 22–31)
CO2 SERPL-SCNC: 20 MMOL/L — LOW (ref 22–31)
CO2 SERPL-SCNC: 20 MMOL/L — LOW (ref 22–31)
CO2 SERPL-SCNC: 24 MMOL/L — SIGNIFICANT CHANGE UP (ref 22–31)
COLOR SPEC: SIGNIFICANT CHANGE UP
COLOR SPEC: SIGNIFICANT CHANGE UP
CREAT SERPL-MCNC: 2.9 MG/DL — HIGH (ref 0.5–1.3)
CREAT SERPL-MCNC: 2.94 MG/DL — HIGH (ref 0.5–1.3)
CREAT SERPL-MCNC: 3.03 MG/DL — HIGH (ref 0.5–1.3)
CREAT SERPL-MCNC: 3.1 MG/DL — HIGH (ref 0.5–1.3)
DIFF PNL FLD: ABNORMAL
DIFF PNL FLD: ABNORMAL
EPI CELLS # UR: 1 /HPF — SIGNIFICANT CHANGE UP
GLUCOSE SERPL-MCNC: 139 MG/DL — HIGH (ref 70–99)
GLUCOSE SERPL-MCNC: 224 MG/DL — HIGH (ref 70–99)
GLUCOSE SERPL-MCNC: 244 MG/DL — HIGH (ref 70–99)
GLUCOSE SERPL-MCNC: 298 MG/DL — HIGH (ref 70–99)
GLUCOSE UR QL: ABNORMAL
GLUCOSE UR QL: NEGATIVE — SIGNIFICANT CHANGE UP
HCT VFR BLD CALC: 32 % — LOW (ref 39–50)
HGB BLD-MCNC: 10.3 G/DL — LOW (ref 13–17)
HYALINE CASTS # UR AUTO: 0 /LPF — SIGNIFICANT CHANGE UP (ref 0–2)
INR BLD: 1 RATIO — SIGNIFICANT CHANGE UP (ref 0.88–1.16)
KETONES UR-MCNC: NEGATIVE — SIGNIFICANT CHANGE UP
KETONES UR-MCNC: NEGATIVE — SIGNIFICANT CHANGE UP
LEUKOCYTE ESTERASE UR-ACNC: ABNORMAL
LEUKOCYTE ESTERASE UR-ACNC: ABNORMAL
MCHC RBC-ENTMCNC: 32.2 GM/DL — SIGNIFICANT CHANGE UP (ref 32–36)
MCHC RBC-ENTMCNC: 33.1 PG — SIGNIFICANT CHANGE UP (ref 27–34)
MCV RBC AUTO: 103 FL — HIGH (ref 80–100)
NITRITE UR-MCNC: NEGATIVE — SIGNIFICANT CHANGE UP
NITRITE UR-MCNC: NEGATIVE — SIGNIFICANT CHANGE UP
PH UR: 5.5 — SIGNIFICANT CHANGE UP (ref 5–8)
PH UR: 6 — SIGNIFICANT CHANGE UP (ref 5–8)
PLATELET # BLD AUTO: 166 K/UL — SIGNIFICANT CHANGE UP (ref 150–400)
POTASSIUM SERPL-MCNC: 4.9 MMOL/L — SIGNIFICANT CHANGE UP (ref 3.5–5.3)
POTASSIUM SERPL-MCNC: 5.2 MMOL/L — SIGNIFICANT CHANGE UP (ref 3.5–5.3)
POTASSIUM SERPL-MCNC: 5.7 MMOL/L — HIGH (ref 3.5–5.3)
POTASSIUM SERPL-MCNC: 5.8 MMOL/L — HIGH (ref 3.5–5.3)
POTASSIUM SERPL-SCNC: 4.9 MMOL/L — SIGNIFICANT CHANGE UP (ref 3.5–5.3)
POTASSIUM SERPL-SCNC: 5.2 MMOL/L — SIGNIFICANT CHANGE UP (ref 3.5–5.3)
POTASSIUM SERPL-SCNC: 5.7 MMOL/L — HIGH (ref 3.5–5.3)
POTASSIUM SERPL-SCNC: 5.8 MMOL/L — HIGH (ref 3.5–5.3)
PROT SERPL-MCNC: 6.8 G/DL — SIGNIFICANT CHANGE UP (ref 6–8.3)
PROT SERPL-MCNC: 7.6 G/DL — SIGNIFICANT CHANGE UP (ref 6–8.3)
PROT UR-MCNC: ABNORMAL
PROT UR-MCNC: ABNORMAL
PROTHROM AB SERPL-ACNC: 11.4 SEC — SIGNIFICANT CHANGE UP (ref 10–12.9)
RBC # BLD: 3.11 M/UL — LOW (ref 4.2–5.8)
RBC # FLD: 15.5 % — HIGH (ref 10.3–14.5)
RBC CASTS # UR COMP ASSIST: 3 /HPF — SIGNIFICANT CHANGE UP (ref 0–4)
RH IG SCN BLD-IMP: POSITIVE — SIGNIFICANT CHANGE UP
SODIUM SERPL-SCNC: 136 MMOL/L — SIGNIFICANT CHANGE UP (ref 135–145)
SODIUM SERPL-SCNC: 137 MMOL/L — SIGNIFICANT CHANGE UP (ref 135–145)
SODIUM SERPL-SCNC: 139 MMOL/L — SIGNIFICANT CHANGE UP (ref 135–145)
SODIUM SERPL-SCNC: 141 MMOL/L — SIGNIFICANT CHANGE UP (ref 135–145)
SP GR SPEC: 1.02 — SIGNIFICANT CHANGE UP (ref 1.01–1.02)
SP GR SPEC: 1.02 — SIGNIFICANT CHANGE UP (ref 1.01–1.02)
TACROLIMUS SERPL-MCNC: 8.6 NG/ML — SIGNIFICANT CHANGE UP
UROBILINOGEN FLD QL: NEGATIVE — SIGNIFICANT CHANGE UP
UROBILINOGEN FLD QL: NEGATIVE — SIGNIFICANT CHANGE UP
WBC # BLD: 8.3 K/UL — SIGNIFICANT CHANGE UP (ref 3.8–10.5)
WBC # FLD AUTO: 8.3 K/UL — SIGNIFICANT CHANGE UP (ref 3.8–10.5)
WBC UR QL: 6 /HPF — HIGH (ref 0–5)

## 2019-08-10 PROCEDURE — 99232 SBSQ HOSP IP/OBS MODERATE 35: CPT | Mod: GC

## 2019-08-10 RX ORDER — DEXTROSE 50 % IN WATER 50 %
15 SYRINGE (ML) INTRAVENOUS ONCE
Refills: 0 | Status: DISCONTINUED | OUTPATIENT
Start: 2019-08-10 | End: 2019-08-11

## 2019-08-10 RX ORDER — LIDOCAINE 4 G/100G
1 CREAM TOPICAL
Refills: 0 | Status: DISCONTINUED | OUTPATIENT
Start: 2019-08-10 | End: 2019-08-11

## 2019-08-10 RX ORDER — INSULIN LISPRO 100/ML
VIAL (ML) SUBCUTANEOUS AT BEDTIME
Refills: 0 | Status: DISCONTINUED | OUTPATIENT
Start: 2019-08-10 | End: 2019-08-11

## 2019-08-10 RX ORDER — SODIUM CHLORIDE 9 MG/ML
1000 INJECTION, SOLUTION INTRAVENOUS
Refills: 0 | Status: DISCONTINUED | OUTPATIENT
Start: 2019-08-10 | End: 2019-08-11

## 2019-08-10 RX ORDER — ATOVAQUONE 750 MG/5ML
750 SUSPENSION ORAL
Refills: 0 | Status: DISCONTINUED | OUTPATIENT
Start: 2019-08-10 | End: 2019-08-11

## 2019-08-10 RX ORDER — DEXTROSE 50 % IN WATER 50 %
25 SYRINGE (ML) INTRAVENOUS ONCE
Refills: 0 | Status: DISCONTINUED | OUTPATIENT
Start: 2019-08-10 | End: 2019-08-11

## 2019-08-10 RX ORDER — GLUCAGON INJECTION, SOLUTION 0.5 MG/.1ML
1 INJECTION, SOLUTION SUBCUTANEOUS ONCE
Refills: 0 | Status: DISCONTINUED | OUTPATIENT
Start: 2019-08-10 | End: 2019-08-10

## 2019-08-10 RX ORDER — DEXTROSE 50 % IN WATER 50 %
25 SYRINGE (ML) INTRAVENOUS ONCE
Refills: 0 | Status: DISCONTINUED | OUTPATIENT
Start: 2019-08-10 | End: 2019-08-10

## 2019-08-10 RX ORDER — TACROLIMUS 5 MG/1
4 CAPSULE ORAL
Refills: 0 | Status: DISCONTINUED | OUTPATIENT
Start: 2019-08-10 | End: 2019-08-11

## 2019-08-10 RX ORDER — CALCIUM CARBONATE 500(1250)
1 TABLET ORAL AT BEDTIME
Refills: 0 | Status: DISCONTINUED | OUTPATIENT
Start: 2019-08-09 | End: 2019-08-11

## 2019-08-10 RX ORDER — DEXTROSE 50 % IN WATER 50 %
15 SYRINGE (ML) INTRAVENOUS ONCE
Refills: 0 | Status: DISCONTINUED | OUTPATIENT
Start: 2019-08-10 | End: 2019-08-10

## 2019-08-10 RX ORDER — INSULIN LISPRO 100/ML
VIAL (ML) SUBCUTANEOUS
Refills: 0 | Status: DISCONTINUED | OUTPATIENT
Start: 2019-08-10 | End: 2019-08-10

## 2019-08-10 RX ORDER — DEXTROSE 50 % IN WATER 50 %
12.5 SYRINGE (ML) INTRAVENOUS ONCE
Refills: 0 | Status: DISCONTINUED | OUTPATIENT
Start: 2019-08-10 | End: 2019-08-11

## 2019-08-10 RX ORDER — SODIUM CHLORIDE 9 MG/ML
1000 INJECTION, SOLUTION INTRAVENOUS
Refills: 0 | Status: DISCONTINUED | OUTPATIENT
Start: 2019-08-10 | End: 2019-08-10

## 2019-08-10 RX ORDER — GLUCAGON INJECTION, SOLUTION 0.5 MG/.1ML
1 INJECTION, SOLUTION SUBCUTANEOUS ONCE
Refills: 0 | Status: DISCONTINUED | OUTPATIENT
Start: 2019-08-10 | End: 2019-08-11

## 2019-08-10 RX ORDER — CITRIC ACID/SODIUM CITRATE 300-500 MG
15 SOLUTION, ORAL ORAL THREE TIMES A DAY
Refills: 0 | Status: DISCONTINUED | OUTPATIENT
Start: 2019-08-10 | End: 2019-08-11

## 2019-08-10 RX ORDER — NIFEDIPINE 30 MG
60 TABLET, EXTENDED RELEASE 24 HR ORAL DAILY
Refills: 0 | Status: DISCONTINUED | OUTPATIENT
Start: 2019-08-09 | End: 2019-08-11

## 2019-08-10 RX ORDER — INSULIN LISPRO 100/ML
VIAL (ML) SUBCUTANEOUS
Refills: 0 | Status: DISCONTINUED | OUTPATIENT
Start: 2019-08-10 | End: 2019-08-11

## 2019-08-10 RX ORDER — DEXTROSE 50 % IN WATER 50 %
12.5 SYRINGE (ML) INTRAVENOUS ONCE
Refills: 0 | Status: DISCONTINUED | OUTPATIENT
Start: 2019-08-10 | End: 2019-08-10

## 2019-08-10 RX ORDER — FUROSEMIDE 40 MG
60 TABLET ORAL ONCE
Refills: 0 | Status: COMPLETED | OUTPATIENT
Start: 2019-08-10 | End: 2019-08-10

## 2019-08-10 RX ORDER — GABAPENTIN 400 MG/1
300 CAPSULE ORAL DAILY
Refills: 0 | Status: DISCONTINUED | OUTPATIENT
Start: 2019-08-09 | End: 2019-08-11

## 2019-08-10 RX ORDER — INSULIN LISPRO 100/ML
VIAL (ML) SUBCUTANEOUS AT BEDTIME
Refills: 0 | Status: DISCONTINUED | OUTPATIENT
Start: 2019-08-10 | End: 2019-08-10

## 2019-08-10 RX ORDER — SODIUM CHLORIDE 9 MG/ML
1000 INJECTION INTRAMUSCULAR; INTRAVENOUS; SUBCUTANEOUS
Refills: 0 | Status: DISCONTINUED | OUTPATIENT
Start: 2019-08-10 | End: 2019-08-11

## 2019-08-10 RX ADMIN — TACROLIMUS 4 MILLIGRAM(S): 5 CAPSULE ORAL at 17:31

## 2019-08-10 RX ADMIN — ATOVAQUONE 750 MILLIGRAM(S): 750 SUSPENSION ORAL at 06:18

## 2019-08-10 RX ADMIN — TAMSULOSIN HYDROCHLORIDE 0.4 MILLIGRAM(S): 0.4 CAPSULE ORAL at 21:41

## 2019-08-10 RX ADMIN — Medication 100 MILLIGRAM(S): at 00:55

## 2019-08-10 RX ADMIN — Medication 15 MILLILITER(S): at 21:41

## 2019-08-10 RX ADMIN — Medication 4: at 17:54

## 2019-08-10 RX ADMIN — GABAPENTIN 300 MILLIGRAM(S): 400 CAPSULE ORAL at 11:21

## 2019-08-10 RX ADMIN — SODIUM CHLORIDE 70 MILLILITER(S): 9 INJECTION INTRAMUSCULAR; INTRAVENOUS; SUBCUTANEOUS at 12:13

## 2019-08-10 RX ADMIN — Medication 15 MILLILITER(S): at 13:06

## 2019-08-10 RX ADMIN — ATOVAQUONE 750 MILLIGRAM(S): 750 SUSPENSION ORAL at 17:31

## 2019-08-10 RX ADMIN — Medication 3: at 13:23

## 2019-08-10 RX ADMIN — TACROLIMUS 3 MILLIGRAM(S): 5 CAPSULE ORAL at 06:20

## 2019-08-10 RX ADMIN — Medication 60 MILLIGRAM(S): at 06:19

## 2019-08-10 RX ADMIN — SODIUM CHLORIDE 70 MILLILITER(S): 9 INJECTION INTRAMUSCULAR; INTRAVENOUS; SUBCUTANEOUS at 21:41

## 2019-08-10 RX ADMIN — LIDOCAINE 1 APPLICATION(S): 4 CREAM TOPICAL at 06:15

## 2019-08-10 RX ADMIN — Medication 3: at 09:05

## 2019-08-10 RX ADMIN — Medication 60 MILLIGRAM(S): at 19:37

## 2019-08-10 RX ADMIN — Medication 15 MILLILITER(S): at 06:18

## 2019-08-10 RX ADMIN — Medication 100 MILLIGRAM(S): at 13:04

## 2019-08-10 RX ADMIN — Medication 1 TABLET(S): at 21:41

## 2019-08-10 NOTE — PROGRESS NOTE ADULT - ASSESSMENT
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 Patient now w/ uptrending Cr on outpatient labs (3.4), being directly admitted for rejection treatment.

## 2019-08-10 NOTE — PROGRESS NOTE ADULT - PROBLEM SELECTOR PLAN 1
Start NS @ 70 cc/ hr for K level 5.8  Pulse steroids x 3 days first dose 8/9/19  Solumedrol 250 mg IVSS x 1 #2/3   ISS  send U/A C&S for hx of urosepsis   recheck BMP this afternoon  cbc/ bmp in am   I/Os  regular diet Start NS @ 70 cc/ hr for K level 5.8  Pulse steroids x 3 days first dose 8/9/19  Solumedrol 250 mg IVSS x 1 #2/3   Immuno:  FK, mepron, pred  ISS  send U/A C&S for hx of urosepsis   recheck BMP this afternoon  cbc/ bmp in am   FK level daily  I/Os  regular diet

## 2019-08-11 ENCOUNTER — TRANSCRIPTION ENCOUNTER (OUTPATIENT)
Age: 69
End: 2019-08-11

## 2019-08-11 VITALS — SYSTOLIC BLOOD PRESSURE: 150 MMHG | DIASTOLIC BLOOD PRESSURE: 60 MMHG

## 2019-08-11 LAB
ALBUMIN SERPL ELPH-MCNC: 3.6 G/DL — SIGNIFICANT CHANGE UP (ref 3.3–5)
ALP SERPL-CCNC: 78 U/L — SIGNIFICANT CHANGE UP (ref 40–120)
ALT FLD-CCNC: 22 U/L — SIGNIFICANT CHANGE UP (ref 10–45)
ANION GAP SERPL CALC-SCNC: 15 MMOL/L — SIGNIFICANT CHANGE UP (ref 5–17)
APTT BLD: 30.3 SEC — SIGNIFICANT CHANGE UP (ref 27.5–36.3)
AST SERPL-CCNC: 20 U/L — SIGNIFICANT CHANGE UP (ref 10–40)
BASOPHILS # BLD AUTO: 0 K/UL — SIGNIFICANT CHANGE UP (ref 0–0.2)
BASOPHILS NFR BLD AUTO: 0.1 % — SIGNIFICANT CHANGE UP (ref 0–2)
BILIRUB SERPL-MCNC: 0.1 MG/DL — LOW (ref 0.2–1.2)
BUN SERPL-MCNC: 45 MG/DL — HIGH (ref 7–23)
CALCIUM SERPL-MCNC: 9.7 MG/DL — SIGNIFICANT CHANGE UP (ref 8.4–10.5)
CHLORIDE SERPL-SCNC: 104 MMOL/L — SIGNIFICANT CHANGE UP (ref 96–108)
CO2 SERPL-SCNC: 18 MMOL/L — LOW (ref 22–31)
CREAT SERPL-MCNC: 3 MG/DL — HIGH (ref 0.5–1.3)
CULTURE RESULTS: SIGNIFICANT CHANGE UP
EOSINOPHIL # BLD AUTO: 0 K/UL — SIGNIFICANT CHANGE UP (ref 0–0.5)
EOSINOPHIL NFR BLD AUTO: 0.3 % — SIGNIFICANT CHANGE UP (ref 0–6)
GLUCOSE SERPL-MCNC: 229 MG/DL — HIGH (ref 70–99)
HCT VFR BLD CALC: 29.5 % — LOW (ref 39–50)
HGB BLD-MCNC: 9.6 G/DL — LOW (ref 13–17)
INR BLD: 1.02 RATIO — SIGNIFICANT CHANGE UP (ref 0.88–1.16)
LYMPHOCYTES # BLD AUTO: 1.6 K/UL — SIGNIFICANT CHANGE UP (ref 1–3.3)
LYMPHOCYTES # BLD AUTO: 13.7 % — SIGNIFICANT CHANGE UP (ref 13–44)
MCHC RBC-ENTMCNC: 32.4 GM/DL — SIGNIFICANT CHANGE UP (ref 32–36)
MCHC RBC-ENTMCNC: 32.8 PG — SIGNIFICANT CHANGE UP (ref 27–34)
MCV RBC AUTO: 101 FL — HIGH (ref 80–100)
MONOCYTES # BLD AUTO: 0.6 K/UL — SIGNIFICANT CHANGE UP (ref 0–0.9)
MONOCYTES NFR BLD AUTO: 5.2 % — SIGNIFICANT CHANGE UP (ref 2–14)
NEUTROPHILS # BLD AUTO: 9.5 K/UL — HIGH (ref 1.8–7.4)
NEUTROPHILS NFR BLD AUTO: 80.8 % — HIGH (ref 43–77)
PLATELET # BLD AUTO: 175 K/UL — SIGNIFICANT CHANGE UP (ref 150–400)
POTASSIUM SERPL-MCNC: 5.2 MMOL/L — SIGNIFICANT CHANGE UP (ref 3.5–5.3)
POTASSIUM SERPL-SCNC: 5.2 MMOL/L — SIGNIFICANT CHANGE UP (ref 3.5–5.3)
PROT SERPL-MCNC: 7.2 G/DL — SIGNIFICANT CHANGE UP (ref 6–8.3)
PROTHROM AB SERPL-ACNC: 11.7 SEC — SIGNIFICANT CHANGE UP (ref 10–12.9)
RBC # BLD: 2.92 M/UL — LOW (ref 4.2–5.8)
RBC # FLD: 15.7 % — HIGH (ref 10.3–14.5)
SODIUM SERPL-SCNC: 137 MMOL/L — SIGNIFICANT CHANGE UP (ref 135–145)
SPECIMEN SOURCE: SIGNIFICANT CHANGE UP
TACROLIMUS SERPL-MCNC: 7.5 NG/ML — SIGNIFICANT CHANGE UP
WBC # BLD: 11.8 K/UL — HIGH (ref 3.8–10.5)
WBC # FLD AUTO: 11.8 K/UL — HIGH (ref 3.8–10.5)

## 2019-08-11 PROCEDURE — 81001 URINALYSIS AUTO W/SCOPE: CPT

## 2019-08-11 PROCEDURE — 99239 HOSP IP/OBS DSCHRG MGMT >30: CPT

## 2019-08-11 PROCEDURE — 86850 RBC ANTIBODY SCREEN: CPT

## 2019-08-11 PROCEDURE — 86901 BLOOD TYPING SEROLOGIC RH(D): CPT

## 2019-08-11 PROCEDURE — 82962 GLUCOSE BLOOD TEST: CPT

## 2019-08-11 PROCEDURE — 87086 URINE CULTURE/COLONY COUNT: CPT

## 2019-08-11 PROCEDURE — 80048 BASIC METABOLIC PNL TOTAL CA: CPT

## 2019-08-11 PROCEDURE — 80053 COMPREHEN METABOLIC PANEL: CPT

## 2019-08-11 PROCEDURE — 86900 BLOOD TYPING SEROLOGIC ABO: CPT

## 2019-08-11 PROCEDURE — 85730 THROMBOPLASTIN TIME PARTIAL: CPT

## 2019-08-11 PROCEDURE — 80197 ASSAY OF TACROLIMUS: CPT

## 2019-08-11 PROCEDURE — 85027 COMPLETE CBC AUTOMATED: CPT

## 2019-08-11 PROCEDURE — 85610 PROTHROMBIN TIME: CPT

## 2019-08-11 PROCEDURE — 83036 HEMOGLOBIN GLYCOSYLATED A1C: CPT

## 2019-08-11 RX ORDER — NIFEDIPINE 30 MG
1 TABLET, EXTENDED RELEASE 24 HR ORAL
Qty: 0 | Refills: 0 | DISCHARGE
Start: 2019-08-11

## 2019-08-11 RX ORDER — NYSTATIN 500MM UNIT
5 POWDER (EA) MISCELLANEOUS
Qty: 600 | Refills: 0
Start: 2019-08-11 | End: 2019-09-09

## 2019-08-11 RX ORDER — NIFEDIPINE 30 MG
1 TABLET, EXTENDED RELEASE 24 HR ORAL
Qty: 0 | Refills: 0 | DISCHARGE

## 2019-08-11 RX ORDER — VALGANCICLOVIR 450 MG/1
1 TABLET, FILM COATED ORAL
Qty: 30 | Refills: 0
Start: 2019-08-11 | End: 2019-09-09

## 2019-08-11 RX ORDER — TACROLIMUS 5 MG/1
4 CAPSULE ORAL
Qty: 0 | Refills: 0 | DISCHARGE
Start: 2019-08-11

## 2019-08-11 RX ORDER — CALCIUM CARBONATE 500(1250)
1 TABLET ORAL
Qty: 0 | Refills: 0 | DISCHARGE
Start: 2019-08-11

## 2019-08-11 RX ADMIN — Medication 15 MILLILITER(S): at 14:19

## 2019-08-11 RX ADMIN — Medication 4: at 12:34

## 2019-08-11 RX ADMIN — ATOVAQUONE 750 MILLIGRAM(S): 750 SUSPENSION ORAL at 06:04

## 2019-08-11 RX ADMIN — ATOVAQUONE 750 MILLIGRAM(S): 750 SUSPENSION ORAL at 17:48

## 2019-08-11 RX ADMIN — Medication 2: at 08:54

## 2019-08-11 RX ADMIN — GABAPENTIN 300 MILLIGRAM(S): 400 CAPSULE ORAL at 12:33

## 2019-08-11 RX ADMIN — TACROLIMUS 4 MILLIGRAM(S): 5 CAPSULE ORAL at 06:04

## 2019-08-11 RX ADMIN — Medication 15 MILLILITER(S): at 06:04

## 2019-08-11 RX ADMIN — Medication 100 MILLIGRAM(S): at 11:26

## 2019-08-11 RX ADMIN — Medication 60 MILLIGRAM(S): at 06:04

## 2019-08-11 RX ADMIN — TACROLIMUS 4 MILLIGRAM(S): 5 CAPSULE ORAL at 17:48

## 2019-08-11 NOTE — PROGRESS NOTE ADULT - SUBJECTIVE AND OBJECTIVE BOX
Transplant Surgery - Multidisciplinary Rounds  --------------------------------------------------------------  R DDRT     Date:    2018      Present:   Patient seen with multidisciplinary team including Transplant Surgeon: Dr. Heath. and  Nurse Practitioner:  Silva William.  Disciplines not in attendance will be notified of the plan.     68M with a PMH of ESRD 2/2 bilateral nephrectomy from neoplasm, hypertension, and NIDDM s/p HCV + DDRT (2018) c/b DGF, ATN/mild rejection (2018 treated with steroids) and perinephric collection requiring drainage,previous admission in (2019) with E coli bacteremia 2/2 urosepsis treated w/ IV vanc/cefepime/zosyn.  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 Recently admitted in 2018 w/ severe CHELA 2/2 FK toxicity (level on admission was 39), +UA, was treated w/ cefepime. Patient now w/ uptrending Cr on outpatient labs (3.4), being directly admitted for rejection treatment.     Interval Events:  Yesterday started on IVF for hydration and mildly hyperkalemic K 5.8 repeat 5.7.  Given lasix 60 mg x 1  with good urine output 2.8 L.  Received dose #2 solumedrol 250 mg IV.  Serum cr today 3.0 from 2.9 yesterday.  No complaints at this time, VSS.      Potential Discharge date: today.     Education:  Medications    Plan of care:  See Below    MEDICATIONS  (STANDING):  atovaquone Suspension 750 milliGRAM(s) Oral two times a day  calcium carbonate    500 mG (Tums) Chewable 1 Tablet(s) Chew at bedtime  citric acid/sodium citrate Solution 15 milliLiter(s) Oral three times a day  dextrose 5%. 1000 milliLiter(s) (50 mL/Hr) IV Continuous <Continuous>  dextrose 50% Injectable 12.5 Gram(s) IV Push once  dextrose 50% Injectable 25 Gram(s) IV Push once  dextrose 50% Injectable 25 Gram(s) IV Push once  gabapentin 300 milliGRAM(s) Oral daily  insulin lispro (HumaLOG) corrective regimen sliding scale   SubCutaneous three times a day before meals  insulin lispro (HumaLOG) corrective regimen sliding scale   SubCutaneous at bedtime  NIFEdipine XL 60 milliGRAM(s) Oral daily  sodium chloride 0.9%. 1000 milliLiter(s) (70 mL/Hr) IV Continuous <Continuous>  tacrolimus 4 milliGRAM(s) Oral two times a day  tamsulosin 0.4 milliGRAM(s) Oral at bedtime    MEDICATIONS  (PRN):  dextrose 40% Gel 15 Gram(s) Oral once PRN Blood Glucose LESS THAN 70 milliGRAM(s)/deciliter  glucagon  Injectable 1 milliGRAM(s) IntraMuscular once PRN Glucose LESS THAN 70 milligrams/deciliter  lidocaine 2% Gel 1 Application(s) Topical two times a day PRN pain      PAST MEDICAL & SURGICAL HISTORY:  Medial meniscus tear: &#x27; 90&#x27;s  Right  Bowel obstruction: &#x27; 2017   surgically treated  Anal fissure: dx: &#x27; 2018   No surgery  Benign prostatic hypertrophy  Hepatitis C: In Donor Kidney: patient received treatment for Hep. C : post treatment: patient  tested Negative for Hep C  Kidney neoplasm: dx: &#x27;  : Left      s/p bilateral Nephrectomy &#x27;   ESRD (end stage renal disease): On Dialysis &#x27;  to 2018  Type 2 diabetes mellitus: dx: &#x27;88  HTN (hypertension)  Kidney transplant recipient: 2018  @ [facility] :  +  Hep C Donor  S/P right knee arthroscopy: &#x27; 90&#x27;s  H/O ileostomy: &#x27; 2017   for 3 months. 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      Vital Signs Last 24 Hrs  T(C): 36.6 (11 Aug 2019 09:00), Max: 37.1 (10 Aug 2019 21:00)  T(F): 97.8 (11 Aug 2019 09:00), Max: 98.8 (10 Aug 2019 21:00)  HR: 88 (11 Aug 2019 09:00) (57 - 99)  BP: 154/77 (11 Aug 2019 09:00) (136/77 - 175/83)  BP(mean): --  RR: 18 (11 Aug 2019 09:00) (18 - 18)  SpO2: 97% (11 Aug 2019 09:) (97% - 100%)    I&O's Summary    10 Aug 2019 07:01  -  11 Aug 2019 07:00  --------------------------------------------------------  IN: 3075 mL / OUT: 2870 mL / NET: 205 mL    11 Aug 2019 07:01  -  11 Aug 2019 11:49  --------------------------------------------------------  IN: 570 mL / OUT: 275 mL / NET: 295 mL                              9.6    11.8  )-----------( 175      ( 11 Aug 2019 05:49 )             29.5     11    137  |  104  |  45<H>  ----------------------------<  229<H>  5.2   |  18<L>  |  3.00<H>    Ca    9.7      11 Aug 2019 05:49    TPro  7.2  /  Alb  3.6  /  TBili  0.1<L>  /  DBili  x   /  AST  20  /  ALT  22  /  AlkPhos  78      Tacrolimus (), Serum: 7.5 ng/mL ( @ 08:36)          Review of systems  Gen: No weight changes, fatigue, fevers/chills, weakness  Skin: No rashes  Head/Eyes/Ears/Mouth: No headache; Normal hearing; Normal vision w/o blurriness; No sinus pain/discomfort, sore throat  Respiratory: No dyspnea, cough, wheezing, hemoptysis  CV: No chest pain, PND, orthopnea  GI: No  abdominal pain at surgical site, No diarrhea, constipation, nausea, vomiting, melena, hematochezia  : No increased frequency, dysuria, hematuria, nocturia  MSK: No joint pain/swelling; no back pain; no edema  Neuro: No dizziness/lightheadedness, weakness, seizures, numbness, tingling  Heme: No easy bruising or bleeding  Endo: No heat/cold intolerance  Psych: No significant nervousness, anxiety, stress, depression  All other systems were reviewed and are negative, except as noted.      PHYSICAL EXAM:  Constitutional: Well developed / well nourished  Eyes: Anicteric, PERRLA  ENMT: nc/at  Neck: supple  Respiratory: CTA B/L  Cardiovascular: RRR  Gastrointestinal: Soft abdomen, non tender to touch  ND  Genitourinary: Voiding spontaneously  Extremities: no edema, scds in place  Vascular: Palpable dp pulses bilaterally  Neurological: A&O x3  Skin: no rashes or lesions   Musculoskeletal: Moving all extremities  Psychiatric: Responsive

## 2019-08-11 NOTE — DISCHARGE NOTE PROVIDER - NSDCCPCAREPLAN_GEN_ALL_CORE_FT
PRINCIPAL DISCHARGE DIAGNOSIS  Diagnosis: Acute rejection of renal transplant  Assessment and Plan of Treatment: Immunosuppression  You have been given IV steroids therefore   Keep away from people who have cough, cold, and symptom of flu.  Only take medications that are on your discharge list  Call the Transplant office for any fever (over 100.4 F) or chills  If you missed your medications call the transplant office, do not double up medication because you missed a dose.  If you have any question regarding your medication please call transplant office.

## 2019-08-11 NOTE — DISCHARGE NOTE PROVIDER - CARE PROVIDER_API CALL
Arian Bhakta (DO)  Nephrology  [address] [place] [address]  Phone: [phone]  Fax: [phone]  Follow Up Time:

## 2019-08-11 NOTE — DISCHARGE NOTE NURSING/CASE MANAGEMENT/SOCIAL WORK - NSDCFUADDAPPT_GEN_ALL_CORE_FT
FOLLOW-UP:  1. Please call to make a follow-up appointment with Dr. Bhakta this week. [phone]  2. Please follow up with your primary care physician in one week regarding your hospitalization.

## 2019-08-11 NOTE — DISCHARGE NOTE PROVIDER - HOSPITAL COURSE
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 Recently admitted in 05/2018 w/ severe CHELA 2/2 FK toxicity (level on admission was 39), +UA, was treated w/ cefepime. Patient now w/ uptrending Cr on outpatient labs (3.4), He was admitted and treated for ACR 1B.  Over his hospitalization he received pulse dose steroids Solumedrol 250 mg IV x 3 doses.  During is hospitalization he was found to have mild hyperkalemia K 5.7 for which he received IV lasix.  His creatinine on day of discharge 3.0 and K level was 5.3.          Immunosuppression on discharge was tacrolimus 4 mg BID,  prednisone taper and will continue on his home mepron dose..  He will be started on ppx with valcyte 450 mg TIW and Nystatin S/S.      Pt was evaluated by the multi-disciplinary team including surgeon, nephrologist, NP,  and nursing and deemed stable for discharge to home with close outpatient follow-up.

## 2019-08-11 NOTE — DISCHARGE NOTE NURSING/CASE MANAGEMENT/SOCIAL WORK - NSDCDPATPORTLINK_GEN_ALL_CORE
You can access the [facility] Patient Portal, offered by [facility], by registering with the following website: [email or web address]

## 2019-08-11 NOTE — PROGRESS NOTE ADULT - ASSESSMENT
· Assessment		  68M with a PMH of ESRD 2/2 bilateral nephrectomy from neoplasm, hypertension, and NIDDM s/p HCV + DDRT (7/2018) w/ simulect induction, c/b DGF, ATN/mild rejection (8/2018 treated with steroids) and perinephric collection requiring drainage, previous admission in (2/2019) with E coli bacteremia 2/2 urosepsis,  2/19/19 ureteral stent removal;  Recently admitted in 05/2018 w/ severe CHELA 2/2 FK toxicity (level on admission was 39), +UA, was treated w/ cefepime. Patient now w/ uptrending Cr on outpatient labs (3.4), being directly admitted for rejection treatment.          Problem/Plan - 1:  ·  Problem: Acute rejection of renal transplant. Plan: cr plateau 2.9-3.0   Solumedrol 250 mg IVSS x 3 doses  8/9/19, 8/10/19 and 8/11/19  Immuno: Tarolimus, Pred, atorvaqone  Fk level daily   ISS  I/Os  regular diet.   d/c home after final dose steroids

## 2019-08-12 ENCOUNTER — TRANSCRIPTION ENCOUNTER (OUTPATIENT)
Age: 69
End: 2019-08-12

## 2019-08-15 ENCOUNTER — LABORATORY RESULT (OUTPATIENT)
Age: 69
End: 2019-08-15

## 2019-08-15 ENCOUNTER — APPOINTMENT (OUTPATIENT)
Dept: NEPHROLOGY | Facility: CLINIC | Age: 69
End: 2019-08-15
Payer: MEDICARE

## 2019-08-15 VITALS
SYSTOLIC BLOOD PRESSURE: 162 MMHG | WEIGHT: 214 LBS | BODY MASS INDEX: 29.96 KG/M2 | OXYGEN SATURATION: 100 % | HEIGHT: 71 IN | TEMPERATURE: 98.4 F | DIASTOLIC BLOOD PRESSURE: 78 MMHG | RESPIRATION RATE: 16 BRPM | HEART RATE: 88 BPM

## 2019-08-15 LAB
ALBUMIN SERPL ELPH-MCNC: 3.7 G/DL
ALP BLD-CCNC: 75 U/L
ALT SERPL-CCNC: 29 U/L
ANION GAP SERPL CALC-SCNC: 11 MMOL/L
APPEARANCE: CLEAR
AST SERPL-CCNC: 24 U/L
BACTERIA: NEGATIVE
BASOPHILS # BLD AUTO: 0 K/UL
BASOPHILS NFR BLD AUTO: 0 %
BILIRUB SERPL-MCNC: 0.3 MG/DL
BILIRUBIN URINE: NEGATIVE
BLOOD URINE: NORMAL
BUN SERPL-MCNC: 45 MG/DL
CALCIUM SERPL-MCNC: 9.8 MG/DL
CHLORIDE SERPL-SCNC: 107 MMOL/L
CO2 SERPL-SCNC: 23 MMOL/L
COLOR: NORMAL
CREAT SERPL-MCNC: 2.4 MG/DL
CREAT SPEC-SCNC: 102 MG/DL
CREAT/PROT UR: 0.3 RATIO
EOSINOPHIL # BLD AUTO: 0 K/UL
EOSINOPHIL NFR BLD AUTO: 0 %
GLUCOSE QUALITATIVE U: ABNORMAL
GLUCOSE SERPL-MCNC: 182 MG/DL
HCT VFR BLD CALC: 32.8 %
HGB BLD-MCNC: 10.1 G/DL
HIV1+2 AB SPEC QL IA.RAPID: NONREACTIVE
HYALINE CASTS: 1 /LPF
KETONES URINE: NEGATIVE
LDH SERPL-CCNC: 251 U/L
LEUKOCYTE ESTERASE URINE: NEGATIVE
LYMPHOCYTES # BLD AUTO: 1.36 K/UL
LYMPHOCYTES NFR BLD AUTO: 18 %
MAGNESIUM SERPL-MCNC: 1.8 MG/DL
MAN DIFF?: NORMAL
MCHC RBC-ENTMCNC: 30.8 GM/DL
MCHC RBC-ENTMCNC: 32.1 PG
MCV RBC AUTO: 104.1 FL
MICROSCOPIC-UA: NORMAL
MONOCYTES # BLD AUTO: 0.38 K/UL
MONOCYTES NFR BLD AUTO: 5 %
NEUTROPHILS # BLD AUTO: 5.6 K/UL
NEUTROPHILS NFR BLD AUTO: 74 %
NITRITE URINE: NEGATIVE
PH URINE: 6
PHOSPHATE SERPL-MCNC: 3.4 MG/DL
PLATELET # BLD AUTO: 171 K/UL
POTASSIUM SERPL-SCNC: 5.4 MMOL/L
PROT SERPL-MCNC: 6.7 G/DL
PROT UR-MCNC: 26 MG/DL
PROTEIN URINE: NORMAL
RBC # BLD: 3.15 M/UL
RBC # FLD: 16.2 %
RED BLOOD CELLS URINE: 2 /HPF
SODIUM SERPL-SCNC: 141 MMOL/L
SPECIFIC GRAVITY URINE: 1.02
SQUAMOUS EPITHELIAL CELLS: 1 /HPF
TACROLIMUS SERPL-MCNC: 7.6 NG/ML
UROBILINOGEN URINE: NORMAL
WBC # FLD AUTO: 7.57 K/UL
WHITE BLOOD CELLS URINE: 4 /HPF

## 2019-08-15 PROCEDURE — 99214 OFFICE O/P EST MOD 30 MIN: CPT

## 2019-08-15 RX ORDER — CIPROFLOXACIN HYDROCHLORIDE 250 MG/1
250 TABLET, FILM COATED ORAL
Qty: 28 | Refills: 0 | Status: DISCONTINUED | COMMUNITY
Start: 2019-08-02 | End: 2019-08-15

## 2019-08-15 RX ORDER — ATOVAQUONE 750 MG/5ML
750 SUSPENSION ORAL DAILY
Qty: 4 | Refills: 3 | Status: DISCONTINUED | COMMUNITY
Start: 2018-10-02 | End: 2019-08-15

## 2019-08-15 RX ORDER — MAGNESIUM OXIDE 241.3 MG/1000MG
400 TABLET ORAL
Qty: 30 | Refills: 3 | Status: ACTIVE | COMMUNITY
Start: 2019-01-15

## 2019-08-15 NOTE — PHYSICAL EXAM
[General Appearance - Alert] : alert [General Appearance - Well Nourished] : well nourished [General Appearance - In No Acute Distress] : in no acute distress [Sclera] : the sclera and conjunctiva were normal [General Appearance - Well Developed] : well developed [General Appearance - Well-Appearing] : healthy appearing [Extraocular Movements] : extraocular movements were intact [Outer Ear] : the ears and nose were normal in appearance [Neck Appearance] : the appearance of the neck was normal [Neck Cervical Mass (___cm)] : no neck mass was observed [Jugular Venous Distention Increased] : there was no jugular-venous distention [Respiration, Rhythm And Depth] : normal respiratory rhythm and effort [Exaggerated Use Of Accessory Muscles For Inspiration] : no accessory muscle use [Auscultation Breath Sounds / Voice Sounds] : lungs were clear to auscultation bilaterally [Heart Rate And Rhythm] : heart rate was normal and rhythm regular [Heart Sounds] : normal S1 and S2 [Heart Sounds Gallop] : no gallops [Edema] : there was no peripheral edema [Bowel Sounds] : normal bowel sounds [Abdomen Soft] : soft [Abdomen Tenderness] : non-tender [] : no hepato-splenomegaly [Cervical Lymph Nodes Enlarged Posterior Bilaterally] : posterior cervical [Supraclavicular Lymph Nodes Enlarged Bilaterally] : supraclavicular [Cervical Lymph Nodes Enlarged Anterior Bilaterally] : anterior cervical [Abnormal Walk] : normal gait [No CVA Tenderness] : no ~M costovertebral angle tenderness [Nail Clubbing] : no clubbing  or cyanosis of the fingernails [___ (cm) Fistula] : [unfilled] (cm) fistula [Musculoskeletal - Swelling] : no joint swelling seen [Skin Color & Pigmentation] : normal skin color and pigmentation [Skin Turgor] : normal skin turgor [Cranial Nerves] : cranial nerves 2-12 were intact [Sensation] : the sensory exam was normal to light touch and pinprick [Oriented To Time, Place, And Person] : oriented to person, place, and time [FreeTextEntry1] : + ventral hernia, reducible.

## 2019-08-15 NOTE — HISTORY OF PRESENT ILLNESS
[FreeTextEntry1] : Mr. Medrano is a 67 yrs old male ESRD secondary to DM2 (HD since 2015, followed by Dr. Bonilla) s/p  donor kidney transplant on 18.\par HCV donor. 40 yrs old male. cold ischemia time 23 hrs. delayed graft function. \par \par PT was readmitted for anemia and hematoma.  Required 2 kidney biopsies which revealed few oxalate crystals and borderline rejection for which he was treated with solumedrol 375 and prednisone taper.  \par \par Interval events:\par Pt had a biopsy which was read inadequate but had lot of neutrophiles and plasma cells.  Suspicious for rejection.  Urine culture was positive so pt treated with 2 weeks of antibiotics.  Function did not improve so pt admitted and pulsed with 3 days of solumedrol 250mgs.  He is on pred 20 today - being tapered daily.  he feels better than usual today.  Urine had been cloudy but clear today.

## 2019-08-15 NOTE — ASSESSMENT
[FreeTextEntry1] : 1.  Kidney transplantation - Pts aaron creatinine 1.9 but had CHELA with pyelonephritis. Has had multiple episodes of CHELA.  Creatinine last visit 3.4 - treated for UTI and biopsy August 2019 possible rejection treated with solumedrol 750mgs.      \par First biopsy revealed borderline rejection and oxalate crystals.  Also temporarily had nephrostomy and JJ stent which have been removed.  \par 2.  Immunosuppression - simulect induction, tacrolimus target 5-7, held MMF due to recent infections and diarrhea but in lieu of recent rejection will resume MMF 500mgs BID. \par 3.  DM2 - on januvia and glimepiride.  BG controlled at home. \par 4.  HTN - controlled.  \par 5.  HCV - genotype 3a.  completed Epclusa.  Last vl note detected.  \par 6.  Anemia - due to CKD/CHELA - received aranesp from his hematologist few weeks ago.   \par 7.  Chronic diarrhea - on and off on lomotil prn.  Has rectal fissure.  Has seen GI and had a colonoscopy.   Has history of small bowel resection which may be the cause of diarrhea.  \par 8.  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  \par 9.  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  Placed back on valcyte s/p treatment of rejection.  \par \par Pt will return for f/u in 1 month.  \par He will also see Dr. Bonilla in 2 months.  \par

## 2019-08-16 LAB
BKV DNA SPEC QL NAA+PROBE: NOT DETECTED COPIES/ML
HBV CORE IGG+IGM SER QL: NONREACTIVE
HBV DNA # SERPL NAA+PROBE: NOT DETECTED
HBV SURFACE AG SER QL: NONREACTIVE
HCV AB SER QL: NONREACTIVE
HCV RNA SERPL NAA DL=5-ACNC: NOT DETECTED IU/ML
HCV RNA SERPL NAA+PROBE-LOG IU: NOT DETECTED LOGIU/ML
HCV S/CO RATIO: 0.09 S/CO
HEPB DNA PCR LOG: NOT DETECTED LOGIU/ML
HIV1 RNA # SERPL NAA+PROBE: NORMAL
HIV1 RNA # SERPL NAA+PROBE: NORMAL COPIES/ML
VIRAL LOAD INTERP: NORMAL
VIRAL LOAD LOG: NORMAL LG COP/ML

## 2019-08-19 LAB
CMV DNA SPEC QL NAA+PROBE: NOT DETECTED
CMVPCR LOG: NOT DETECTED LOGIU/ML

## 2019-09-11 ENCOUNTER — LABORATORY RESULT (OUTPATIENT)
Age: 69
End: 2019-09-11

## 2019-09-11 ENCOUNTER — APPOINTMENT (OUTPATIENT)
Dept: TRANSPLANT | Facility: CLINIC | Age: 69
End: 2019-09-11

## 2019-09-12 ENCOUNTER — APPOINTMENT (OUTPATIENT)
Dept: NEPHROLOGY | Facility: CLINIC | Age: 69
End: 2019-09-12
Payer: MEDICARE

## 2019-09-12 VITALS
OXYGEN SATURATION: 98 % | BODY MASS INDEX: 29.96 KG/M2 | WEIGHT: 214 LBS | DIASTOLIC BLOOD PRESSURE: 71 MMHG | RESPIRATION RATE: 15 BRPM | SYSTOLIC BLOOD PRESSURE: 152 MMHG | HEIGHT: 71 IN | TEMPERATURE: 98.5 F | HEART RATE: 78 BPM

## 2019-09-12 DIAGNOSIS — B25.9 CYTOMEGALOVIRAL DISEASE, UNSPECIFIED: ICD-10-CM

## 2019-09-12 DIAGNOSIS — E72.53 PRIMARY HYPEROXALURIA: ICD-10-CM

## 2019-09-12 PROCEDURE — 99214 OFFICE O/P EST MOD 30 MIN: CPT

## 2019-09-12 RX ORDER — PHENOL 1.4 %
600 AEROSOL, SPRAY (ML) MUCOUS MEMBRANE
Qty: 60 | Refills: 3 | Status: ACTIVE | COMMUNITY
Start: 2018-09-27

## 2019-09-12 RX ORDER — METOPROLOL TARTRATE 50 MG/1
50 TABLET, FILM COATED ORAL
Qty: 90 | Refills: 3 | Status: DISCONTINUED | COMMUNITY
Start: 2019-04-11 | End: 2019-09-12

## 2019-09-12 NOTE — PHYSICAL EXAM
[General Appearance - Alert] : alert [General Appearance - In No Acute Distress] : in no acute distress [General Appearance - Well Developed] : well developed [General Appearance - Well Nourished] : well nourished [General Appearance - Well-Appearing] : healthy appearing [Extraocular Movements] : extraocular movements were intact [Sclera] : the sclera and conjunctiva were normal [Outer Ear] : the ears and nose were normal in appearance [Neck Appearance] : the appearance of the neck was normal [Neck Cervical Mass (___cm)] : no neck mass was observed [Jugular Venous Distention Increased] : there was no jugular-venous distention [Respiration, Rhythm And Depth] : normal respiratory rhythm and effort [Exaggerated Use Of Accessory Muscles For Inspiration] : no accessory muscle use [Auscultation Breath Sounds / Voice Sounds] : lungs were clear to auscultation bilaterally [Heart Rate And Rhythm] : heart rate was normal and rhythm regular [Heart Sounds] : normal S1 and S2 [Heart Sounds Gallop] : no gallops [Edema] : there was no peripheral edema [Abdomen Soft] : soft [Bowel Sounds] : normal bowel sounds [] : no hepato-splenomegaly [Abdomen Tenderness] : non-tender [Cervical Lymph Nodes Enlarged Posterior Bilaterally] : posterior cervical [Cervical Lymph Nodes Enlarged Anterior Bilaterally] : anterior cervical [Supraclavicular Lymph Nodes Enlarged Bilaterally] : supraclavicular [No CVA Tenderness] : no ~M costovertebral angle tenderness [Abnormal Walk] : normal gait [Nail Clubbing] : no clubbing  or cyanosis of the fingernails [___ (cm) Fistula] : [unfilled] (cm) fistula [Musculoskeletal - Swelling] : no joint swelling seen [Skin Color & Pigmentation] : normal skin color and pigmentation [Skin Turgor] : normal skin turgor [Cranial Nerves] : cranial nerves 2-12 were intact [Sensation] : the sensory exam was normal to light touch and pinprick [Oriented To Time, Place, And Person] : oriented to person, place, and time [FreeTextEntry1] : + ventral hernia, reducible.

## 2019-09-12 NOTE — HISTORY OF PRESENT ILLNESS
[FreeTextEntry1] : Mr. Medrano is a 67 yrs old male ESRD secondary to DM2 (HD since 2015, followed by Dr. Bonilla) s/p  donor kidney transplant on 18.\par HCV donor. 40 yrs old male. cold ischemia time 23 hrs. delayed graft function. \par \par PT was readmitted for anemia and hematoma.  Required 2 kidney biopsies which revealed few oxalate crystals and borderline rejection for which he was treated with solumedrol 375 and prednisone taper.  \par \par Interval events:\par Pts blood pressure has been high at home.  Occasionally has diarrhea at night - recently up to 4 times per night.  rarely using lomotil.

## 2019-09-12 NOTE — ASSESSMENT
[FreeTextEntry1] : 1.  Kidney transplantation - Pts aaron creatinine 1.9 but had CHELA with pyelonephritis. Has had multiple episodes of CHLEA.  Latest CHELA - treated for UTI and biopsy August 2019 possible rejection treated with solumedrol 750mgs.      Creatinine after was 2.8.  \par First biopsy revealed borderline rejection and oxalate crystals.  Also temporarily had nephrostomy and JJ stent which have been removed.  \par 2.  Immunosuppression - simulect induction, tacrolimus target 5-7, MMF 500mgs BID - increase to 1gm BID in setting of recent rejection.  Discussed with patient - if diarrhea increases to reduce back to 500mgs BID.  Will change tacrolimus to envarsus as tacrolimus is on a national shortage.  Start envarsus 6mgs daily.  \par 3.  DM2 - on januvia and glimepiride.  BG controlled at home. \par 4.  HTN - elevated.  Inc metoprolol to 100mgs.  \par 5.  HCV - genotype 3a.  completed Epclusa.  Last vl note detected.  \par 6.  Anemia - due to CKD/CHELA - received aranesp from his hematologist few weeks ago.   \par 7.  Chronic diarrhea - on and off on lomotil prn.  Has rectal fissure.  Has seen GI and had a colonoscopy.   Has history of small bowel resection which may be the cause of diarrhea.  Asked pt to use lomotil at night.  \par 8.  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  \par 9.  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  Placed back on valcyte s/p treatment of rejection.  \par \par Pt will return for f/u in 1 month.  \par He will also see Dr. Bonilla in 2 months.  \par

## 2019-09-13 LAB
ALBUMIN SERPL ELPH-MCNC: 4 G/DL
ALP BLD-CCNC: 66 U/L
ALT SERPL-CCNC: 17 U/L
ANION GAP SERPL CALC-SCNC: 10 MMOL/L
APPEARANCE: CLEAR
AST SERPL-CCNC: 20 U/L
BACTERIA: NEGATIVE
BASOPHILS # BLD AUTO: 0.02 K/UL
BASOPHILS NFR BLD AUTO: 0.4 %
BILIRUB SERPL-MCNC: 0.2 MG/DL
BILIRUBIN URINE: NEGATIVE
BKV DNA SPEC QL NAA+PROBE: NOT DETECTED COPIES/ML
BLOOD URINE: NEGATIVE
BUN SERPL-MCNC: 29 MG/DL
CALCIUM SERPL-MCNC: 10 MG/DL
CHLORIDE SERPL-SCNC: 108 MMOL/L
CMV DNA SPEC QL NAA+PROBE: NOT DETECTED
CMVPCR LOG: NOT DETECTED LOGIU/ML
CO2 SERPL-SCNC: 22 MMOL/L
COLOR: NORMAL
CREAT SERPL-MCNC: 2.81 MG/DL
CREAT SPEC-SCNC: 116 MG/DL
CREAT/PROT UR: 0.1 RATIO
EOSINOPHIL # BLD AUTO: 0.11 K/UL
EOSINOPHIL NFR BLD AUTO: 2.2 %
GLUCOSE QUALITATIVE U: NEGATIVE
GLUCOSE SERPL-MCNC: 135 MG/DL
HCT VFR BLD CALC: 35.5 %
HGB BLD-MCNC: 10.8 G/DL
HYALINE CASTS: 0 /LPF
IMM GRANULOCYTES NFR BLD AUTO: 1.4 %
KETONES URINE: NEGATIVE
LDH SERPL-CCNC: 239 U/L
LEUKOCYTE ESTERASE URINE: NEGATIVE
LYMPHOCYTES # BLD AUTO: 1.83 K/UL
LYMPHOCYTES NFR BLD AUTO: 35.9 %
MAGNESIUM SERPL-MCNC: 1.5 MG/DL
MAN DIFF?: NORMAL
MCHC RBC-ENTMCNC: 30.4 GM/DL
MCHC RBC-ENTMCNC: 33.1 PG
MCV RBC AUTO: 108.9 FL
MICROSCOPIC-UA: NORMAL
MONOCYTES # BLD AUTO: 0.54 K/UL
MONOCYTES NFR BLD AUTO: 10.6 %
NEUTROPHILS # BLD AUTO: 2.53 K/UL
NEUTROPHILS NFR BLD AUTO: 49.5 %
NITRITE URINE: NEGATIVE
PH URINE: 6
PHOSPHATE SERPL-MCNC: 3.4 MG/DL
PLATELET # BLD AUTO: 139 K/UL
POTASSIUM SERPL-SCNC: 5.6 MMOL/L
PROT SERPL-MCNC: 6.4 G/DL
PROT UR-MCNC: 9 MG/DL
PROTEIN URINE: NEGATIVE
RBC # BLD: 3.26 M/UL
RBC # FLD: 14.7 %
RED BLOOD CELLS URINE: 1 /HPF
SODIUM SERPL-SCNC: 140 MMOL/L
SPECIFIC GRAVITY URINE: 1.02
SQUAMOUS EPITHELIAL CELLS: 0 /HPF
TACROLIMUS SERPL-MCNC: 4.5 NG/ML
URATE SERPL-MCNC: 6.2 MG/DL
UROBILINOGEN URINE: NORMAL
WBC # FLD AUTO: 5.1 K/UL
WHITE BLOOD CELLS URINE: 1 /HPF

## 2019-09-17 PROBLEM — B25.9 CMV (CYTOMEGALOVIRUS INFECTION): Status: ACTIVE | Noted: 2019-09-17

## 2019-09-19 ENCOUNTER — APPOINTMENT (OUTPATIENT)
Dept: TRANSPLANT | Facility: CLINIC | Age: 69
End: 2019-09-19

## 2019-09-19 LAB
ALBUMIN SERPL ELPH-MCNC: 4.2 G/DL
ALP BLD-CCNC: 64 U/L
ALT SERPL-CCNC: 17 U/L
ANION GAP SERPL CALC-SCNC: 13 MMOL/L
APPEARANCE: CLEAR
AST SERPL-CCNC: 31 U/L
BACTERIA: NEGATIVE
BASOPHILS # BLD AUTO: 0.04 K/UL
BASOPHILS NFR BLD AUTO: 1 %
BILIRUB SERPL-MCNC: 0.2 MG/DL
BILIRUBIN URINE: NEGATIVE
BLOOD URINE: NORMAL
BUN SERPL-MCNC: 23 MG/DL
CALCIUM SERPL-MCNC: 9.9 MG/DL
CHLORIDE SERPL-SCNC: 107 MMOL/L
CO2 SERPL-SCNC: 21 MMOL/L
COLOR: NORMAL
CREAT SERPL-MCNC: 2.6 MG/DL
CREAT SPEC-SCNC: 154 MG/DL
CREAT/PROT UR: 0.1 RATIO
EOSINOPHIL # BLD AUTO: 0.04 K/UL
EOSINOPHIL NFR BLD AUTO: 1 %
GLUCOSE QUALITATIVE U: NEGATIVE
GLUCOSE SERPL-MCNC: 149 MG/DL
HCT VFR BLD CALC: 34.2 %
HGB BLD-MCNC: 10.7 G/DL
HYALINE CASTS: 0 /LPF
IMM GRANULOCYTES NFR BLD AUTO: 1.5 %
KETONES URINE: NEGATIVE
LDH SERPL-CCNC: 279 U/L
LEUKOCYTE ESTERASE URINE: NEGATIVE
LYMPHOCYTES # BLD AUTO: 1.34 K/UL
LYMPHOCYTES NFR BLD AUTO: 33.3 %
MAGNESIUM SERPL-MCNC: 1.6 MG/DL
MAN DIFF?: NORMAL
MCHC RBC-ENTMCNC: 31.3 GM/DL
MCHC RBC-ENTMCNC: 33.5 PG
MCV RBC AUTO: 107.2 FL
MICROSCOPIC-UA: NORMAL
MONOCYTES # BLD AUTO: 0.53 K/UL
MONOCYTES NFR BLD AUTO: 13.2 %
NEUTROPHILS # BLD AUTO: 2.01 K/UL
NEUTROPHILS NFR BLD AUTO: 50 %
NITRITE URINE: NEGATIVE
PH URINE: 5.5
PHOSPHATE SERPL-MCNC: 3.7 MG/DL
PLATELET # BLD AUTO: 145 K/UL
POTASSIUM SERPL-SCNC: 5.2 MMOL/L
PROT SERPL-MCNC: 6.5 G/DL
PROT UR-MCNC: 18 MG/DL
PROTEIN URINE: NORMAL
RBC # BLD: 3.19 M/UL
RBC # FLD: 14.3 %
RED BLOOD CELLS URINE: 2 /HPF
SODIUM SERPL-SCNC: 141 MMOL/L
SPECIFIC GRAVITY URINE: 1.02
SQUAMOUS EPITHELIAL CELLS: 1 /HPF
TACROLIMUS SERPL-MCNC: 2.8 NG/ML
URATE SERPL-MCNC: 7.6 MG/DL
UROBILINOGEN URINE: NORMAL
WBC # FLD AUTO: 4.02 K/UL
WHITE BLOOD CELLS URINE: 1 /HPF

## 2019-09-20 LAB
BKV DNA SPEC QL NAA+PROBE: NOT DETECTED COPIES/ML
CMV DNA SPEC QL NAA+PROBE: NOT DETECTED
CMVPCR LOG: NOT DETECTED LOGIU/ML
HBV DNA # SERPL NAA+PROBE: NOT DETECTED
HBV SURFACE AG SER QL: NONREACTIVE
HCV AB SER QL: NONREACTIVE
HCV S/CO RATIO: 0.09 S/CO
HEPB DNA PCR LOG: NOT DETECTED LOGIU/ML
HIV1 RNA # SERPL NAA+PROBE: NORMAL
HIV1 RNA # SERPL NAA+PROBE: NORMAL COPIES/ML
HIV1+2 AB SPEC QL IA.RAPID: NONREACTIVE
VIRAL LOAD INTERP: NORMAL
VIRAL LOAD LOG: NORMAL LG COP/ML

## 2019-10-11 DIAGNOSIS — Z00.00 ENCOUNTER FOR GENERAL ADULT MEDICAL EXAMINATION W/OUT ABNORMAL FINDINGS: ICD-10-CM

## 2019-10-11 DIAGNOSIS — E55.9 VITAMIN D DEFICIENCY, UNSPECIFIED: ICD-10-CM

## 2019-10-14 ENCOUNTER — APPOINTMENT (OUTPATIENT)
Dept: NEPHROLOGY | Facility: CLINIC | Age: 69
End: 2019-10-14
Payer: MEDICARE

## 2019-10-14 VITALS
SYSTOLIC BLOOD PRESSURE: 122 MMHG | HEART RATE: 79 BPM | TEMPERATURE: 98 F | RESPIRATION RATE: 15 BRPM | BODY MASS INDEX: 29.12 KG/M2 | HEIGHT: 71 IN | OXYGEN SATURATION: 99 % | WEIGHT: 208 LBS | DIASTOLIC BLOOD PRESSURE: 54 MMHG

## 2019-10-14 LAB
ALBUMIN SERPL ELPH-MCNC: 4.5 G/DL
ALP BLD-CCNC: 64 U/L
ALT SERPL-CCNC: 22 U/L
ANION GAP SERPL CALC-SCNC: 11 MMOL/L
AST SERPL-CCNC: 34 U/L
BASOPHILS # BLD AUTO: 0.03 K/UL
BASOPHILS NFR BLD AUTO: 0.6 %
BILIRUB SERPL-MCNC: 0.2 MG/DL
BUN SERPL-MCNC: 35 MG/DL
CALCIUM SERPL-MCNC: 10.3 MG/DL
CALCIUM SERPL-MCNC: 10.3 MG/DL
CHLORIDE SERPL-SCNC: 111 MMOL/L
CHOLEST SERPL-MCNC: 121 MG/DL
CHOLEST/HDLC SERPL: 1.7 RATIO
CO2 SERPL-SCNC: 18 MMOL/L
CREAT SERPL-MCNC: 2.44 MG/DL
CREAT SPEC-SCNC: 149 MG/DL
CREAT/PROT UR: 0.1 RATIO
EOSINOPHIL # BLD AUTO: 0.08 K/UL
EOSINOPHIL NFR BLD AUTO: 1.7 %
GLUCOSE SERPL-MCNC: 137 MG/DL
HCT VFR BLD CALC: 36 %
HDLC SERPL-MCNC: 73 MG/DL
HGB BLD-MCNC: 10.9 G/DL
IMM GRANULOCYTES NFR BLD AUTO: 1.1 %
LDH SERPL-CCNC: 307 U/L
LDLC SERPL CALC-MCNC: 31 MG/DL
LYMPHOCYTES # BLD AUTO: 1.41 K/UL
LYMPHOCYTES NFR BLD AUTO: 30.5 %
MAGNESIUM SERPL-MCNC: 1.8 MG/DL
MAN DIFF?: NORMAL
MCHC RBC-ENTMCNC: 30.3 GM/DL
MCHC RBC-ENTMCNC: 32.3 PG
MCV RBC AUTO: 106.8 FL
MONOCYTES # BLD AUTO: 0.64 K/UL
MONOCYTES NFR BLD AUTO: 13.8 %
NEUTROPHILS # BLD AUTO: 2.42 K/UL
NEUTROPHILS NFR BLD AUTO: 52.3 %
PARATHYROID HORMONE INTACT: 46 PG/ML
PHOSPHATE SERPL-MCNC: 3.8 MG/DL
PLATELET # BLD AUTO: 142 K/UL
POTASSIUM SERPL-SCNC: 5.3 MMOL/L
PROT SERPL-MCNC: 6.5 G/DL
PROT UR-MCNC: 15 MG/DL
RBC # BLD: 3.37 M/UL
RBC # FLD: 14.2 %
SODIUM SERPL-SCNC: 140 MMOL/L
TACROLIMUS SERPL-MCNC: 6.5 NG/ML
TRIGL SERPL-MCNC: 85 MG/DL
URATE SERPL-MCNC: 6.3 MG/DL
WBC # FLD AUTO: 4.63 K/UL

## 2019-10-14 PROCEDURE — 99214 OFFICE O/P EST MOD 30 MIN: CPT | Mod: GC

## 2019-10-14 RX ORDER — TACROLIMUS 0.5 MG/1
0.5 CAPSULE ORAL TWICE DAILY
Qty: 480 | Refills: 5 | Status: DISCONTINUED | COMMUNITY
Start: 2018-07-19 | End: 2019-10-14

## 2019-10-14 NOTE — PHYSICAL EXAM
[General Appearance - Alert] : alert [General Appearance - Well Nourished] : well nourished [General Appearance - In No Acute Distress] : in no acute distress [General Appearance - Well Developed] : well developed [General Appearance - Well-Appearing] : healthy appearing [] : normal voice and communication [Sclera] : the sclera and conjunctiva were normal [Neck Appearance] : the appearance of the neck was normal [Heart Rate And Rhythm] : heart rate was normal and rhythm regular [Heart Sounds] : normal S1 and S2 [Bowel Sounds] : normal bowel sounds [Edema] : there was no peripheral edema [Abdomen Soft] : soft [No CVA Tenderness] : no ~M costovertebral angle tenderness [Abdomen Tenderness] : non-tender [No Focal Deficits] : no focal deficits [Oriented To Time, Place, And Person] : oriented to person, place, and time [Impaired Insight] : insight and judgment were intact

## 2019-10-15 LAB
25(OH)D3 SERPL-MCNC: 14.3 NG/ML
APPEARANCE: CLEAR
BACTERIA: NEGATIVE
BILIRUBIN URINE: NEGATIVE
BKV DNA SPEC QL NAA+PROBE: NOT DETECTED COPIES/ML
BLOOD URINE: NEGATIVE
COLOR: NORMAL
ESTIMATED AVERAGE GLUCOSE: 128 MG/DL
GLUCOSE QUALITATIVE U: NEGATIVE
HBA1C MFR BLD HPLC: 6.1 %
HYALINE CASTS: 2 /LPF
KETONES URINE: NEGATIVE
LEUKOCYTE ESTERASE URINE: NEGATIVE
MICROSCOPIC-UA: NORMAL
NITRITE URINE: NEGATIVE
PH URINE: 6
PROTEIN URINE: NORMAL
RED BLOOD CELLS URINE: 2 /HPF
SPECIFIC GRAVITY URINE: 1.02
SQUAMOUS EPITHELIAL CELLS: 1 /HPF
UROBILINOGEN URINE: NORMAL
WHITE BLOOD CELLS URINE: 2 /HPF

## 2019-10-16 LAB
CMV DNA SPEC QL NAA+PROBE: ABNORMAL IU/ML
CMVPCR LOG: ABNORMAL LOGIU/ML

## 2019-10-16 NOTE — REVIEW OF SYSTEMS
[Diarrhea] : diarrhea [Fever] : no fever [Chills] : no chills [Feeling Poorly] : not feeling poorly [Feeling Tired] : not feeling tired [Sore Throat] : no sore throat [Chest Pain] : no chest pain [Palpitations] : no palpitations [Lower Ext Edema] : no extremity edema [Shortness Of Breath] : no shortness of breath [Wheezing] : no wheezing [Cough] : no cough [SOB on Exertion] : no shortness of breath during exertion [Abdominal Pain] : no abdominal pain [Vomiting] : no vomiting [Constipation] : no constipation [Heartburn] : no heartburn [Dysuria] : no dysuria [Incontinence] : no incontinence [Nocturia] : no nocturia [Arthralgias] : no arthralgias

## 2019-10-16 NOTE — HISTORY OF PRESENT ILLNESS
[FreeTextEntry1] : Mr. Medrano is a 67 yrs old male ESRD secondary to DM2 (HD since 2015, followed by Dr. Bonilla) s/p  donor kidney transplant on 18.\par HCV donor. 40 yrs old male. cold ischemia time 23 hrs. delayed graft function. \par \par PT was readmitted for anemia and hematoma. Required 2 kidney biopsies which revealed few oxalate crystals and borderline rejection for which he was treated with solumedrol 375 and prednisone taper. \par \par Interval events:\par - since last visit on 19, patient report BP better controlled sBP 130s/70s after increase metoprolol.  Intermittent diarrhea 3-4 times mostly at night no change in frequency (watery at night, and more formed during day).

## 2019-10-16 NOTE — CONSULT LETTER
[Dear  ___] : Dear  [unfilled], [Consult Letter:] : I had the pleasure of evaluating your patient, [unfilled]. [Please see my note below.] : Please see my note below. [Consult Closing:] : Thank you very much for allowing me to participate in the care of this patient.  If you have any questions, please do not hesitate to contact me. [Sincerely,] : Sincerely, [FreeTextEntry3] : Arian Bhakta, DO

## 2019-10-16 NOTE — END OF VISIT
[] : Fellow [FreeTextEntry3] : Pt feels ok.  Creatinine today 2.4 - improved from prior.  Will continue current immunosuppression.

## 2019-11-19 ENCOUNTER — APPOINTMENT (OUTPATIENT)
Dept: TRANSPLANT | Facility: CLINIC | Age: 69
End: 2019-11-19

## 2019-11-20 LAB
25(OH)D3 SERPL-MCNC: 19.1 NG/ML
ALBUMIN SERPL ELPH-MCNC: 4.2 G/DL
ALP BLD-CCNC: 64 U/L
ALT SERPL-CCNC: 17 U/L
ANION GAP SERPL CALC-SCNC: 9 MMOL/L
APPEARANCE: CLEAR
AST SERPL-CCNC: 27 U/L
BACTERIA: NEGATIVE
BASOPHILS # BLD AUTO: 0.02 K/UL
BASOPHILS NFR BLD AUTO: 0.4 %
BILIRUB SERPL-MCNC: 0.2 MG/DL
BILIRUBIN URINE: NEGATIVE
BKV DNA SPEC QL NAA+PROBE: NOT DETECTED COPIES/ML
BLOOD URINE: NORMAL
BUN SERPL-MCNC: 28 MG/DL
CALCIUM SERPL-MCNC: 9.9 MG/DL
CALCIUM SERPL-MCNC: 9.9 MG/DL
CHLORIDE SERPL-SCNC: 108 MMOL/L
CHOLEST SERPL-MCNC: 129 MG/DL
CHOLEST/HDLC SERPL: 2.2 RATIO
CMV DNA SPEC QL NAA+PROBE: NOT DETECTED
CMVPCR LOG: NOT DETECTED LOGIU/ML
CO2 SERPL-SCNC: 24 MMOL/L
COLOR: NORMAL
CREAT SERPL-MCNC: 2.82 MG/DL
CREAT SPEC-SCNC: 184 MG/DL
CREAT/PROT UR: 0.1 RATIO
EOSINOPHIL # BLD AUTO: 0.09 K/UL
EOSINOPHIL NFR BLD AUTO: 2 %
ESTIMATED AVERAGE GLUCOSE: 123 MG/DL
GLUCOSE QUALITATIVE U: NEGATIVE
GLUCOSE SERPL-MCNC: 151 MG/DL
HBA1C MFR BLD HPLC: 5.9 %
HCT VFR BLD CALC: 34.5 %
HDLC SERPL-MCNC: 60 MG/DL
HGB BLD-MCNC: 10.9 G/DL
HYALINE CASTS: 1 /LPF
IMM GRANULOCYTES NFR BLD AUTO: 1.3 %
KETONES URINE: NEGATIVE
LDH SERPL-CCNC: 287 U/L
LDLC SERPL CALC-MCNC: 33 MG/DL
LEUKOCYTE ESTERASE URINE: NEGATIVE
LYMPHOCYTES # BLD AUTO: 1.54 K/UL
LYMPHOCYTES NFR BLD AUTO: 33.6 %
MAGNESIUM SERPL-MCNC: 1.5 MG/DL
MAN DIFF?: NORMAL
MCHC RBC-ENTMCNC: 31.6 GM/DL
MCHC RBC-ENTMCNC: 32 PG
MCV RBC AUTO: 101.2 FL
MICROSCOPIC-UA: NORMAL
MONOCYTES # BLD AUTO: 0.43 K/UL
MONOCYTES NFR BLD AUTO: 9.4 %
NEUTROPHILS # BLD AUTO: 2.44 K/UL
NEUTROPHILS NFR BLD AUTO: 53.3 %
NITRITE URINE: NEGATIVE
PARATHYROID HORMONE INTACT: 97 PG/ML
PH URINE: 6
PHOSPHATE SERPL-MCNC: 2.8 MG/DL
PLATELET # BLD AUTO: 158 K/UL
POTASSIUM SERPL-SCNC: 5.3 MMOL/L
PROT SERPL-MCNC: 6.5 G/DL
PROT UR-MCNC: 14 MG/DL
PROTEIN URINE: NORMAL
RBC # BLD: 3.41 M/UL
RBC # FLD: 13.5 %
RED BLOOD CELLS URINE: 2 /HPF
SODIUM SERPL-SCNC: 141 MMOL/L
SPECIFIC GRAVITY URINE: 1.02
SQUAMOUS EPITHELIAL CELLS: 1 /HPF
TACROLIMUS SERPL-MCNC: 6.3 NG/ML
TRIGL SERPL-MCNC: 181 MG/DL
URATE SERPL-MCNC: 7.6 MG/DL
UROBILINOGEN URINE: NORMAL
WBC # FLD AUTO: 4.58 K/UL
WHITE BLOOD CELLS URINE: 2 /HPF

## 2019-11-21 ENCOUNTER — APPOINTMENT (OUTPATIENT)
Dept: NEPHROLOGY | Facility: CLINIC | Age: 69
End: 2019-11-21

## 2019-12-02 ENCOUNTER — LABORATORY RESULT (OUTPATIENT)
Age: 69
End: 2019-12-02

## 2019-12-02 ENCOUNTER — APPOINTMENT (OUTPATIENT)
Dept: NEPHROLOGY | Facility: CLINIC | Age: 69
End: 2019-12-02
Payer: MEDICARE

## 2019-12-02 ENCOUNTER — APPOINTMENT (OUTPATIENT)
Dept: TRANSPLANT | Facility: CLINIC | Age: 69
End: 2019-12-02

## 2019-12-02 VITALS
WEIGHT: 214 LBS | HEART RATE: 80 BPM | DIASTOLIC BLOOD PRESSURE: 63 MMHG | RESPIRATION RATE: 16 BRPM | HEIGHT: 71 IN | SYSTOLIC BLOOD PRESSURE: 140 MMHG | BODY MASS INDEX: 29.96 KG/M2 | TEMPERATURE: 97.4 F | OXYGEN SATURATION: 98 %

## 2019-12-02 LAB
25(OH)D3 SERPL-MCNC: 17.8 NG/ML
ALBUMIN SERPL ELPH-MCNC: 4.3 G/DL
ALP BLD-CCNC: 72 U/L
ALT SERPL-CCNC: 13 U/L
ANION GAP SERPL CALC-SCNC: 12 MMOL/L
APPEARANCE: CLEAR
AST SERPL-CCNC: 24 U/L
BACTERIA: NEGATIVE
BASOPHILS # BLD AUTO: 0 K/UL
BASOPHILS NFR BLD AUTO: 0 %
BILIRUB SERPL-MCNC: 0.2 MG/DL
BILIRUBIN URINE: NEGATIVE
BLOOD URINE: ABNORMAL
BUN SERPL-MCNC: 29 MG/DL
CALCIUM SERPL-MCNC: 10 MG/DL
CALCIUM SERPL-MCNC: 10 MG/DL
CHLORIDE SERPL-SCNC: 110 MMOL/L
CHOLEST SERPL-MCNC: 111 MG/DL
CHOLEST/HDLC SERPL: 1.7 RATIO
CO2 SERPL-SCNC: 20 MMOL/L
COLOR: NORMAL
CREAT SERPL-MCNC: 2.64 MG/DL
CREAT SPEC-SCNC: 188 MG/DL
CREAT/PROT UR: 0.1 RATIO
EOSINOPHIL # BLD AUTO: 0.07 K/UL
EOSINOPHIL NFR BLD AUTO: 1.7 %
ESTIMATED AVERAGE GLUCOSE: 123 MG/DL
GLUCOSE QUALITATIVE U: NEGATIVE
GLUCOSE SERPL-MCNC: 167 MG/DL
HBA1C MFR BLD HPLC: 5.9 %
HCT VFR BLD CALC: 35.6 %
HDLC SERPL-MCNC: 66 MG/DL
HGB BLD-MCNC: 11.1 G/DL
HYALINE CASTS: 1 /LPF
KETONES URINE: NEGATIVE
LDH SERPL-CCNC: 264 U/L
LDLC SERPL CALC-MCNC: 18 MG/DL
LEUKOCYTE ESTERASE URINE: NEGATIVE
LYMPHOCYTES # BLD AUTO: 1.15 K/UL
LYMPHOCYTES NFR BLD AUTO: 27.3 %
MAGNESIUM SERPL-MCNC: 1.5 MG/DL
MAN DIFF?: NORMAL
MCHC RBC-ENTMCNC: 31.2 GM/DL
MCHC RBC-ENTMCNC: 31.4 PG
MCV RBC AUTO: 100.6 FL
MICROSCOPIC-UA: NORMAL
MONOCYTES # BLD AUTO: 0.18 K/UL
MONOCYTES NFR BLD AUTO: 4.3 %
NEUTROPHILS # BLD AUTO: 2.82 K/UL
NEUTROPHILS NFR BLD AUTO: 65.8 %
NITRITE URINE: NEGATIVE
PARATHYROID HORMONE INTACT: 80 PG/ML
PH URINE: 6
PHOSPHATE SERPL-MCNC: 2.7 MG/DL
PLATELET # BLD AUTO: 141 K/UL
POTASSIUM SERPL-SCNC: 5 MMOL/L
PROT SERPL-MCNC: 6.7 G/DL
PROT UR-MCNC: 14 MG/DL
PROTEIN URINE: NORMAL
RBC # BLD: 3.54 M/UL
RBC # FLD: 13 %
RED BLOOD CELLS URINE: 4 /HPF
SODIUM SERPL-SCNC: 142 MMOL/L
SPECIFIC GRAVITY URINE: 1.02
SQUAMOUS EPITHELIAL CELLS: 1 /HPF
TACROLIMUS SERPL-MCNC: 9.5 NG/ML
TRIGL SERPL-MCNC: 137 MG/DL
URATE SERPL-MCNC: 5.6 MG/DL
UROBILINOGEN URINE: NORMAL
WBC # FLD AUTO: 4.23 K/UL
WHITE BLOOD CELLS URINE: 1 /HPF

## 2019-12-02 PROCEDURE — 99214 OFFICE O/P EST MOD 30 MIN: CPT

## 2019-12-02 RX ORDER — SODIUM POLYSTYRENE SULFONATE 15 G/60ML
15 SUSPENSION ORAL; RECTAL
Qty: 60 | Refills: 0 | Status: DISCONTINUED | COMMUNITY
Start: 2019-05-13 | End: 2019-12-02

## 2019-12-02 RX ORDER — TAMSULOSIN HYDROCHLORIDE 0.4 MG/1
0.4 CAPSULE ORAL
Qty: 90 | Refills: 3 | Status: DISCONTINUED | COMMUNITY
Start: 2018-07-19 | End: 2019-12-02

## 2019-12-02 RX ORDER — NYSTATIN 100000 [USP'U]/ML
100000 SUSPENSION ORAL 4 TIMES DAILY
Qty: 1 | Refills: 0 | Status: DISCONTINUED | COMMUNITY
Start: 2019-08-15 | End: 2019-12-02

## 2019-12-02 RX ORDER — VALGANCICLOVIR HYDROCHLORIDE 450 MG/1
450 TABLET ORAL DAILY
Qty: 30 | Refills: 5 | Status: DISCONTINUED | COMMUNITY
Start: 2019-08-15 | End: 2019-12-02

## 2019-12-02 RX ORDER — FAMOTIDINE 20 MG/1
20 TABLET, FILM COATED ORAL
Qty: 90 | Refills: 3 | Status: DISCONTINUED | COMMUNITY
Start: 2018-07-19 | End: 2019-12-02

## 2019-12-02 NOTE — PHYSICAL EXAM
[General Appearance - In No Acute Distress] : in no acute distress [General Appearance - Alert] : alert [General Appearance - Well Developed] : well developed [General Appearance - Well Nourished] : well nourished [General Appearance - Well-Appearing] : healthy appearing [] : normal voice and communication [Sclera] : the sclera and conjunctiva were normal [Neck Appearance] : the appearance of the neck was normal [Heart Rate And Rhythm] : heart rate was normal and rhythm regular [Heart Sounds] : normal S1 and S2 [Edema] : there was no peripheral edema [Bowel Sounds] : normal bowel sounds [No CVA Tenderness] : no ~M costovertebral angle tenderness [Abdomen Soft] : soft [Abdomen Tenderness] : non-tender [No Focal Deficits] : no focal deficits [Oriented To Time, Place, And Person] : oriented to person, place, and time [Impaired Insight] : insight and judgment were intact

## 2019-12-02 NOTE — CONSULT LETTER
[Dear  ___] : Dear  [unfilled], [Consult Letter:] : I had the pleasure of evaluating your patient, [unfilled]. [Please see my note below.] : Please see my note below. [Sincerely,] : Sincerely, [Consult Closing:] : Thank you very much for allowing me to participate in the care of this patient.  If you have any questions, please do not hesitate to contact me. [FreeTextEntry3] : Arian Bhakta, DO

## 2019-12-02 NOTE — REVIEW OF SYSTEMS
[Diarrhea] : diarrhea [Fever] : no fever [Chills] : no chills [Feeling Poorly] : not feeling poorly [Sore Throat] : no sore throat [Feeling Tired] : not feeling tired [Palpitations] : no palpitations [Chest Pain] : no chest pain [Lower Ext Edema] : no extremity edema [Wheezing] : no wheezing [Shortness Of Breath] : no shortness of breath [Cough] : no cough [Abdominal Pain] : no abdominal pain [SOB on Exertion] : no shortness of breath during exertion [Heartburn] : no heartburn [Vomiting] : no vomiting [Constipation] : no constipation [Nocturia] : no nocturia [Incontinence] : no incontinence [Dysuria] : no dysuria [Arthralgias] : no arthralgias

## 2019-12-02 NOTE — HISTORY OF PRESENT ILLNESS
[FreeTextEntry1] : Mr. Medrano is a 67 yrs old male ESRD secondary to DM2 (HD since 2015, followed by Dr. Bonilla) s/p  donor kidney transplant on 18.\par HCV donor. 40 yrs old male. cold ischemia time 23 hrs. delayed graft function. \par \par PT was readmitted for anemia and hematoma. Required 2 kidney biopsies which revealed few oxalate crystals and borderline rejection for which he was treated with solumedrol 375 and prednisone taper. \par \par Interval events:\par PT feels ok.  Still has occasional loose stool.  Blood pressure has been ok.

## 2019-12-02 NOTE — ASSESSMENT
[FreeTextEntry1] : 1. Kidney transplantation - Pts aaron creatinine 1.9 but had CHELA with pyelonephritis. Has had multiple episodes of CHELA. Latest CHELA - treated for UTI and biopsy August 2019 possible rejection treated with solumedrol 750mgs. Creatinine after was 2.8. First biopsy revealed borderline rejection and oxalate crystals. Also temporarily had nephrostomy and JJ stent which have been removed. Today repeat renal panel.  No trouble urinating and no history of BPH. \par 2. Immunosuppression - simulect induction, tacrolimus target 5-7, Envarsus 6 mg, MMF 1gm BID in setting of recent rejection. Does not appear to be worsening diarrhea.  \par 3. DM2 - on januvia and glimepiride. BG controlled at home. \par 4. HTN - controlled today\par 5. HCV - genotype 3a. completed Epclusa. Last vl note detected. \par 6. Anemia - due to CKD/CHELA.  last Hb at goal. \par 7. Chronic diarrhea - on and off on lomotil prn. Has rectal fissure. Has seen GI and had a colonoscopy. Has history of small bowel resection which may be the cause of diarrhea. Asked pt to use lomotil at night. \par 8. 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 \par 9. 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 Placed back on valcyte s/p treatment of rejection (until end November 2019).  \par \par \par f/u in 1 month\par stop valcyte\par Stop pepcid\par stop flomax - pt told if he develops any problems urinating to resume and call ASAP \par Influenza vaccine given today.

## 2019-12-13 ENCOUNTER — OTHER (OUTPATIENT)
Age: 69
End: 2019-12-13

## 2019-12-16 ENCOUNTER — APPOINTMENT (OUTPATIENT)
Dept: TRANSPLANT | Facility: CLINIC | Age: 69
End: 2019-12-16

## 2019-12-16 LAB
25(OH)D3 SERPL-MCNC: 21.1 NG/ML
ALBUMIN SERPL ELPH-MCNC: 4.1 G/DL
ALP BLD-CCNC: 64 U/L
ALT SERPL-CCNC: 14 U/L
ANION GAP SERPL CALC-SCNC: 11 MMOL/L
APPEARANCE: CLEAR
AST SERPL-CCNC: 24 U/L
BACTERIA: NEGATIVE
BASOPHILS # BLD AUTO: 0.02 K/UL
BASOPHILS NFR BLD AUTO: 0.5 %
BILIRUB SERPL-MCNC: 0.3 MG/DL
BILIRUBIN URINE: NEGATIVE
BLOOD URINE: NEGATIVE
BUN SERPL-MCNC: 22 MG/DL
CALCIUM SERPL-MCNC: 10 MG/DL
CALCIUM SERPL-MCNC: 10.2 MG/DL
CHLORIDE SERPL-SCNC: 104 MMOL/L
CHOLEST SERPL-MCNC: 131 MG/DL
CHOLEST/HDLC SERPL: 1.8 RATIO
CO2 SERPL-SCNC: 28 MMOL/L
COLOR: NORMAL
CREAT SERPL-MCNC: 2.35 MG/DL
CREAT SPEC-SCNC: 216 MG/DL
CREAT/PROT UR: 0.1 RATIO
EOSINOPHIL # BLD AUTO: 0.05 K/UL
EOSINOPHIL NFR BLD AUTO: 1.2 %
ESTIMATED AVERAGE GLUCOSE: 123 MG/DL
GLUCOSE QUALITATIVE U: NEGATIVE
GLUCOSE SERPL-MCNC: 133 MG/DL
HBA1C MFR BLD HPLC: 5.9 %
HCT VFR BLD CALC: 36 %
HDLC SERPL-MCNC: 75 MG/DL
HGB BLD-MCNC: 11 G/DL
HYALINE CASTS: 0 /LPF
IMM GRANULOCYTES NFR BLD AUTO: 1.6 %
KETONES URINE: NEGATIVE
LDH SERPL-CCNC: 295 U/L
LDLC SERPL CALC-MCNC: 30 MG/DL
LEUKOCYTE ESTERASE URINE: ABNORMAL
LYMPHOCYTES # BLD AUTO: 1.4 K/UL
LYMPHOCYTES NFR BLD AUTO: 32.6 %
MAGNESIUM SERPL-MCNC: 1.5 MG/DL
MAN DIFF?: NORMAL
MCHC RBC-ENTMCNC: 30.6 GM/DL
MCHC RBC-ENTMCNC: 31 PG
MCV RBC AUTO: 101.4 FL
MICROSCOPIC-UA: NORMAL
MONOCYTES # BLD AUTO: 0.62 K/UL
MONOCYTES NFR BLD AUTO: 14.5 %
NEUTROPHILS # BLD AUTO: 2.13 K/UL
NEUTROPHILS NFR BLD AUTO: 49.6 %
NITRITE URINE: NEGATIVE
PARATHYROID HORMONE INTACT: 78 PG/ML
PH URINE: 5.5
PHOSPHATE SERPL-MCNC: 4 MG/DL
PLATELET # BLD AUTO: 148 K/UL
POTASSIUM SERPL-SCNC: 5.2 MMOL/L
PROT SERPL-MCNC: 6.5 G/DL
PROT UR-MCNC: 11 MG/DL
PROTEIN URINE: NORMAL
RBC # BLD: 3.55 M/UL
RBC # FLD: 13.2 %
RED BLOOD CELLS URINE: 1 /HPF
SODIUM SERPL-SCNC: 142 MMOL/L
SPECIFIC GRAVITY URINE: 1.02
SQUAMOUS EPITHELIAL CELLS: 4 /HPF
TACROLIMUS SERPL-MCNC: 7.3 NG/ML
TRIGL SERPL-MCNC: 128 MG/DL
URATE SERPL-MCNC: 5.9 MG/DL
UROBILINOGEN URINE: NORMAL
WBC # FLD AUTO: 4.29 K/UL
WHITE BLOOD CELLS URINE: 10 /HPF

## 2019-12-18 LAB
BKV DNA SPEC QL NAA+PROBE: NOT DETECTED COPIES/ML
CMV DNA SPEC QL NAA+PROBE: ABNORMAL IU/ML
CMVPCR LOG: ABNORMAL LOG10IU/ML

## 2020-01-03 ENCOUNTER — OTHER (OUTPATIENT)
Age: 70
End: 2020-01-03

## 2020-01-06 ENCOUNTER — APPOINTMENT (OUTPATIENT)
Dept: TRANSPLANT | Facility: CLINIC | Age: 70
End: 2020-01-06

## 2020-01-06 ENCOUNTER — LABORATORY RESULT (OUTPATIENT)
Age: 70
End: 2020-01-06

## 2020-01-07 LAB
25(OH)D3 SERPL-MCNC: 30.2 NG/ML
ALBUMIN SERPL ELPH-MCNC: 4.1 G/DL
ALP BLD-CCNC: 61 U/L
ALT SERPL-CCNC: 18 U/L
ANION GAP SERPL CALC-SCNC: 14 MMOL/L
APPEARANCE: ABNORMAL
AST SERPL-CCNC: 34 U/L
BACTERIA: ABNORMAL
BASOPHILS # BLD AUTO: 0.06 K/UL
BASOPHILS NFR BLD AUTO: 0.9 %
BILIRUB SERPL-MCNC: 0.3 MG/DL
BILIRUBIN URINE: NEGATIVE
BLOOD URINE: ABNORMAL
BUN SERPL-MCNC: 38 MG/DL
CALCIUM SERPL-MCNC: 10 MG/DL
CALCIUM SERPL-MCNC: 9.9 MG/DL
CHLORIDE SERPL-SCNC: 104 MMOL/L
CHOLEST SERPL-MCNC: 116 MG/DL
CHOLEST/HDLC SERPL: 2 RATIO
CO2 SERPL-SCNC: 21 MMOL/L
COLOR: YELLOW
CREAT SERPL-MCNC: 2.88 MG/DL
CREAT SPEC-SCNC: 173 MG/DL
CREAT/PROT UR: 0.6 RATIO
EOSINOPHIL # BLD AUTO: 0.28 K/UL
EOSINOPHIL NFR BLD AUTO: 4.4 %
ESTIMATED AVERAGE GLUCOSE: 134 MG/DL
GLUCOSE QUALITATIVE U: NEGATIVE
GLUCOSE SERPL-MCNC: 258 MG/DL
HBA1C MFR BLD HPLC: 6.3 %
HCT VFR BLD CALC: 35.5 %
HDLC SERPL-MCNC: 57 MG/DL
HGB BLD-MCNC: 10.8 G/DL
HYALINE CASTS: 2 /LPF
KETONES URINE: NEGATIVE
LDH SERPL-CCNC: 472 U/L
LDLC SERPL CALC-MCNC: 33 MG/DL
LEUKOCYTE ESTERASE URINE: ABNORMAL
LYMPHOCYTES # BLD AUTO: 1.35 K/UL
LYMPHOCYTES NFR BLD AUTO: 21 %
MAGNESIUM SERPL-MCNC: 1.7 MG/DL
MAN DIFF?: NORMAL
MCHC RBC-ENTMCNC: 30.1 PG
MCHC RBC-ENTMCNC: 30.4 GM/DL
MCV RBC AUTO: 98.9 FL
MICROSCOPIC-UA: NORMAL
MONOCYTES # BLD AUTO: 0.28 K/UL
MONOCYTES NFR BLD AUTO: 4.4 %
NEUTROPHILS # BLD AUTO: 4.46 K/UL
NEUTROPHILS NFR BLD AUTO: 69.3 %
NITRITE URINE: NEGATIVE
PARATHYROID HORMONE INTACT: 85 PG/ML
PH URINE: 6
PHOSPHATE SERPL-MCNC: 3.1 MG/DL
PLATELET # BLD AUTO: 213 K/UL
POTASSIUM SERPL-SCNC: 4.6 MMOL/L
PROT SERPL-MCNC: 6.7 G/DL
PROT UR-MCNC: 104 MG/DL
PROTEIN URINE: ABNORMAL
RBC # BLD: 3.59 M/UL
RBC # FLD: 13.2 %
RED BLOOD CELLS URINE: >720 /HPF
SODIUM SERPL-SCNC: 139 MMOL/L
SPECIFIC GRAVITY URINE: 1.02
SQUAMOUS EPITHELIAL CELLS: 1 /HPF
TACROLIMUS SERPL-MCNC: 5.1 NG/ML
TRIGL SERPL-MCNC: 130 MG/DL
URATE SERPL-MCNC: 6.9 MG/DL
UROBILINOGEN URINE: NORMAL
WBC # FLD AUTO: 6.44 K/UL
WHITE BLOOD CELLS URINE: >720 /HPF

## 2020-02-10 ENCOUNTER — APPOINTMENT (OUTPATIENT)
Dept: NEPHROLOGY | Facility: CLINIC | Age: 70
End: 2020-02-10
Payer: MEDICARE

## 2020-02-10 VITALS
TEMPERATURE: 98 F | HEIGHT: 71 IN | HEART RATE: 72 BPM | RESPIRATION RATE: 16 BRPM | WEIGHT: 214 LBS | BODY MASS INDEX: 29.96 KG/M2 | SYSTOLIC BLOOD PRESSURE: 135 MMHG | OXYGEN SATURATION: 99 % | DIASTOLIC BLOOD PRESSURE: 64 MMHG

## 2020-02-10 LAB
25(OH)D3 SERPL-MCNC: 26.5 NG/ML
ALBUMIN SERPL ELPH-MCNC: 4.4 G/DL
ALP BLD-CCNC: 70 U/L
ALT SERPL-CCNC: 22 U/L
ANION GAP SERPL CALC-SCNC: 13 MMOL/L
APPEARANCE: ABNORMAL
AST SERPL-CCNC: 24 U/L
BACTERIA: NEGATIVE
BASOPHILS # BLD AUTO: 0.03 K/UL
BASOPHILS NFR BLD AUTO: 1 %
BILIRUB SERPL-MCNC: 0.2 MG/DL
BILIRUBIN URINE: NEGATIVE
BLOOD URINE: ABNORMAL
BUN SERPL-MCNC: 29 MG/DL
CALCIUM SERPL-MCNC: 9.8 MG/DL
CALCIUM SERPL-MCNC: 9.8 MG/DL
CHLORIDE SERPL-SCNC: 108 MMOL/L
CHOLEST SERPL-MCNC: 125 MG/DL
CHOLEST/HDLC SERPL: 1.8 RATIO
CO2 SERPL-SCNC: 21 MMOL/L
COLOR: NORMAL
CREAT SERPL-MCNC: 3.15 MG/DL
CREAT SPEC-SCNC: 106 MG/DL
CREAT/PROT UR: 0.4 RATIO
EOSINOPHIL # BLD AUTO: 0.11 K/UL
EOSINOPHIL NFR BLD AUTO: 3.7 %
ESTIMATED AVERAGE GLUCOSE: 128 MG/DL
GLUCOSE QUALITATIVE U: NEGATIVE
GLUCOSE SERPL-MCNC: 134 MG/DL
HBA1C MFR BLD HPLC: 6.1 %
HCT VFR BLD CALC: 31.1 %
HDLC SERPL-MCNC: 71 MG/DL
HGB BLD-MCNC: 9.6 G/DL
HYALINE CASTS: 1 /LPF
IMM GRANULOCYTES NFR BLD AUTO: 3.4 %
KETONES URINE: NEGATIVE
LDH SERPL-CCNC: 230 U/L
LDLC SERPL CALC-MCNC: 28 MG/DL
LEUKOCYTE ESTERASE URINE: NEGATIVE
LYMPHOCYTES # BLD AUTO: 1.1 K/UL
LYMPHOCYTES NFR BLD AUTO: 37.2 %
MAGNESIUM SERPL-MCNC: 1.5 MG/DL
MAN DIFF?: NORMAL
MCHC RBC-ENTMCNC: 30.5 PG
MCHC RBC-ENTMCNC: 30.9 GM/DL
MCV RBC AUTO: 98.7 FL
MICROSCOPIC-UA: NORMAL
MONOCYTES # BLD AUTO: 0.3 K/UL
MONOCYTES NFR BLD AUTO: 10.1 %
NEUTROPHILS # BLD AUTO: 1.32 K/UL
NEUTROPHILS NFR BLD AUTO: 44.6 %
NITRITE URINE: NEGATIVE
PARATHYROID HORMONE INTACT: 78 PG/ML
PH URINE: 6
PHOSPHATE SERPL-MCNC: 3.7 MG/DL
PLATELET # BLD AUTO: 123 K/UL
POTASSIUM SERPL-SCNC: 5 MMOL/L
PROT SERPL-MCNC: 6.7 G/DL
PROT UR-MCNC: 41 MG/DL
PROTEIN URINE: ABNORMAL
RBC # BLD: 3.15 M/UL
RBC # FLD: 14.5 %
RED BLOOD CELLS URINE: 445 /HPF
SODIUM SERPL-SCNC: 142 MMOL/L
SPECIFIC GRAVITY URINE: 1.01
SQUAMOUS EPITHELIAL CELLS: 2 /HPF
TACROLIMUS SERPL-MCNC: 3.4 NG/ML
TRIGL SERPL-MCNC: 136 MG/DL
URATE SERPL-MCNC: 5.2 MG/DL
UROBILINOGEN URINE: NORMAL
WBC # FLD AUTO: 2.96 K/UL
WHITE BLOOD CELLS URINE: 5 /HPF

## 2020-02-10 PROCEDURE — 99214 OFFICE O/P EST MOD 30 MIN: CPT

## 2020-02-10 RX ORDER — ERGOCALCIFEROL 1.25 MG/1
1.25 MG CAPSULE, LIQUID FILLED ORAL
Qty: 8 | Refills: 0 | Status: DISCONTINUED | COMMUNITY
Start: 2019-02-12 | End: 2020-02-10

## 2020-02-10 RX ORDER — CIPROFLOXACIN HYDROCHLORIDE 250 MG/1
250 TABLET, FILM COATED ORAL TWICE DAILY
Qty: 14 | Refills: 0 | Status: DISCONTINUED | COMMUNITY
Start: 2020-01-07 | End: 2020-02-10

## 2020-02-10 NOTE — HISTORY OF PRESENT ILLNESS
[FreeTextEntry1] : Mr. Medrano is a 67 yrs old male ESRD secondary to DM2 (HD since 2015, followed by Dr. Bonilla) s/p  donor kidney transplant on 18.\par HCV donor. 40 yrs old male. cold ischemia time 23 hrs. delayed graft function. \par \par PT was readmitted for anemia and hematoma. Required 2 kidney biopsies which revealed few oxalate crystals and borderline rejection for which he was treated with solumedrol 375 and prednisone taper. \par \par Interval events:\par PT feels ok.  Had diarrhea more than usual over the last few days.  has eased up today.  Otherwise well.

## 2020-02-10 NOTE — PHYSICAL EXAM
[General Appearance - In No Acute Distress] : in no acute distress [General Appearance - Well Nourished] : well nourished [General Appearance - Alert] : alert [General Appearance - Well Developed] : well developed [] : normal voice and communication [General Appearance - Well-Appearing] : healthy appearing [Sclera] : the sclera and conjunctiva were normal [Neck Appearance] : the appearance of the neck was normal [Heart Rate And Rhythm] : heart rate was normal and rhythm regular [Heart Sounds] : normal S1 and S2 [Edema] : there was no peripheral edema [Bowel Sounds] : normal bowel sounds [Abdomen Soft] : soft [Abdomen Tenderness] : non-tender [No Focal Deficits] : no focal deficits [No CVA Tenderness] : no ~M costovertebral angle tenderness [Oriented To Time, Place, And Person] : oriented to person, place, and time [Impaired Insight] : insight and judgment were intact [FreeTextEntry1] : palpable left submandibular LN

## 2020-02-10 NOTE — REVIEW OF SYSTEMS
[Diarrhea] : diarrhea [Chills] : no chills [Fever] : no fever [Feeling Poorly] : not feeling poorly [Feeling Tired] : not feeling tired [Sore Throat] : no sore throat [Chest Pain] : no chest pain [Palpitations] : no palpitations [Lower Ext Edema] : no extremity edema [Shortness Of Breath] : no shortness of breath [Wheezing] : no wheezing [Cough] : no cough [SOB on Exertion] : no shortness of breath during exertion [Abdominal Pain] : no abdominal pain [Vomiting] : no vomiting [Constipation] : no constipation [Heartburn] : no heartburn [Dysuria] : no dysuria [Incontinence] : no incontinence [Nocturia] : no nocturia [Arthralgias] : no arthralgias

## 2020-02-10 NOTE — ASSESSMENT
[FreeTextEntry1] : 1. Kidney transplantation - Pts aaron creatinine 1.9 but had CHELA with pyelonephritis. Has had multiple episodes of CHELA. Latest CHELA - treated for UTI and biopsy August 2019 possible rejection treated with solumedrol 750mgs. Creatinine after was 2.8. First biopsy revealed borderline rejection and oxalate crystals. Also temporarily had nephrostomy and JJ stent which have been removed. Today repeat renal panel.  \par 2. Immunosuppression - simulect induction, tacrolimus target 5-7, Envarsus 6 mg, MMF 1gm BID in setting of recent rejection. \par 3. DM2 - on januvia and glimepiride. BG controlled at home.  A1c at goal. \par 4. HTN - controlled today\par 5. HCV - genotype 3a. completed Epclusa. Last vl note detected. \par 6. Anemia - due to CKD/CHELA.  last Hb at goal. \par 7. Chronic diarrhea - on and off on lomotil prn. Has seen GI and had a colonoscopy. Has history of small bowel resection which may be the cause of diarrhea. Asked pt to use lomotil at night. \par 8. 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 \par 9. CMV - pcr last <50 - will check again today in setting of diarrhea worsening.  \par 10.  Lymph node - will check sonogram of neck.  \par \par \par f/u in 2-3 months\par Pt will also schedule f/u with Dr. Bonilla.

## 2020-02-11 LAB
BACTERIA UR CULT: NORMAL
BKV DNA SPEC QL NAA+PROBE: NOT DETECTED COPIES/ML
CMV DNA SPEC QL NAA+PROBE: NOT DETECTED
CMVPCR LOG: NOT DETECTED LOG10IU/ML

## 2020-02-12 ENCOUNTER — FORM ENCOUNTER (OUTPATIENT)
Age: 70
End: 2020-02-12

## 2020-02-13 ENCOUNTER — OUTPATIENT (OUTPATIENT)
Dept: OUTPATIENT SERVICES | Facility: HOSPITAL | Age: 70
LOS: 1 days | End: 2020-02-13
Payer: MEDICARE

## 2020-02-13 ENCOUNTER — APPOINTMENT (OUTPATIENT)
Dept: ULTRASOUND IMAGING | Facility: CLINIC | Age: 70
End: 2020-02-13
Payer: MEDICARE

## 2020-02-13 DIAGNOSIS — Z00.8 ENCOUNTER FOR OTHER GENERAL EXAMINATION: ICD-10-CM

## 2020-02-13 DIAGNOSIS — Z90.5 ACQUIRED ABSENCE OF KIDNEY: Chronic | ICD-10-CM

## 2020-02-13 DIAGNOSIS — Z98.890 OTHER SPECIFIED POSTPROCEDURAL STATES: Chronic | ICD-10-CM

## 2020-02-13 DIAGNOSIS — I77.0 ARTERIOVENOUS FISTULA, ACQUIRED: Chronic | ICD-10-CM

## 2020-02-13 DIAGNOSIS — Z94.0 KIDNEY TRANSPLANT STATUS: Chronic | ICD-10-CM

## 2020-02-13 PROCEDURE — 76536 US EXAM OF HEAD AND NECK: CPT | Mod: 26

## 2020-02-13 PROCEDURE — 76536 US EXAM OF HEAD AND NECK: CPT

## 2020-02-18 ENCOUNTER — APPOINTMENT (OUTPATIENT)
Dept: TRANSPLANT | Facility: CLINIC | Age: 70
End: 2020-02-18

## 2020-02-18 ENCOUNTER — LABORATORY RESULT (OUTPATIENT)
Age: 70
End: 2020-02-18

## 2020-02-18 LAB
ALBUMIN SERPL ELPH-MCNC: 4.2 G/DL
ALP BLD-CCNC: 69 U/L
ALT SERPL-CCNC: 14 U/L
ANION GAP SERPL CALC-SCNC: 11 MMOL/L
APPEARANCE: CLEAR
AST SERPL-CCNC: 22 U/L
BACTERIA: NEGATIVE
BASOPHILS # BLD AUTO: 0.04 K/UL
BASOPHILS NFR BLD AUTO: 0.9 %
BILIRUB SERPL-MCNC: 0.2 MG/DL
BILIRUBIN URINE: NEGATIVE
BLOOD URINE: ABNORMAL
BUN SERPL-MCNC: 35 MG/DL
CALCIUM SERPL-MCNC: 9.6 MG/DL
CHLORIDE SERPL-SCNC: 107 MMOL/L
CO2 SERPL-SCNC: 24 MMOL/L
COLOR: NORMAL
CREAT SERPL-MCNC: 3.31 MG/DL
CREAT SPEC-SCNC: 188 MG/DL
CREAT/PROT UR: 0.2 RATIO
EOSINOPHIL # BLD AUTO: 0.11 K/UL
EOSINOPHIL NFR BLD AUTO: 2.7 %
GLUCOSE QUALITATIVE U: NEGATIVE
GLUCOSE SERPL-MCNC: 205 MG/DL
HCT VFR BLD CALC: 32.2 %
HGB BLD-MCNC: 9.8 G/DL
HYALINE CASTS: 0 /LPF
KETONES URINE: NEGATIVE
LDH SERPL-CCNC: 219 U/L
LEUKOCYTE ESTERASE URINE: NEGATIVE
LYMPHOCYTES # BLD AUTO: 1.34 K/UL
LYMPHOCYTES NFR BLD AUTO: 33.9 %
MAGNESIUM SERPL-MCNC: 1.5 MG/DL
MAN DIFF?: NORMAL
MCHC RBC-ENTMCNC: 30.3 PG
MCHC RBC-ENTMCNC: 30.4 GM/DL
MCV RBC AUTO: 99.7 FL
MICROSCOPIC-UA: NORMAL
MONOCYTES # BLD AUTO: 0.11 K/UL
MONOCYTES NFR BLD AUTO: 2.7 %
NEUTROPHILS # BLD AUTO: 2.18 K/UL
NEUTROPHILS NFR BLD AUTO: 51.8 %
NITRITE URINE: NEGATIVE
PH URINE: 6
PHOSPHATE SERPL-MCNC: 3.6 MG/DL
PLATELET # BLD AUTO: 137 K/UL
POTASSIUM SERPL-SCNC: 5.1 MMOL/L
PROT SERPL-MCNC: 6.4 G/DL
PROT UR-MCNC: 45 MG/DL
PROTEIN URINE: ABNORMAL
RBC # BLD: 3.23 M/UL
RBC # FLD: 14.2 %
RED BLOOD CELLS URINE: 500 /HPF
SODIUM SERPL-SCNC: 142 MMOL/L
SPECIFIC GRAVITY URINE: 1.02
SQUAMOUS EPITHELIAL CELLS: 1 /HPF
TACROLIMUS SERPL-MCNC: 5.8 NG/ML
URATE SERPL-MCNC: 5.5 MG/DL
UROBILINOGEN URINE: NORMAL
WBC # FLD AUTO: 3.94 K/UL
WHITE BLOOD CELLS URINE: 3 /HPF

## 2020-02-20 ENCOUNTER — APPOINTMENT (OUTPATIENT)
Dept: TRANSPLANT | Facility: CLINIC | Age: 70
End: 2020-02-20

## 2020-02-20 LAB
ALBUMIN SERPL ELPH-MCNC: 4.1 G/DL
ALP BLD-CCNC: 70 U/L
ALT SERPL-CCNC: 12 U/L
ANION GAP SERPL CALC-SCNC: 11 MMOL/L
APPEARANCE: ABNORMAL
AST SERPL-CCNC: 20 U/L
BACTERIA: NEGATIVE
BASOPHILS # BLD AUTO: 0.02 K/UL
BASOPHILS NFR BLD AUTO: 0.5 %
BILIRUB SERPL-MCNC: 0.2 MG/DL
BILIRUBIN URINE: NEGATIVE
BLOOD URINE: ABNORMAL
BUN SERPL-MCNC: 37 MG/DL
CALCIUM SERPL-MCNC: 9.5 MG/DL
CHLORIDE SERPL-SCNC: 105 MMOL/L
CO2 SERPL-SCNC: 23 MMOL/L
COLOR: YELLOW
CREAT SERPL-MCNC: 3.52 MG/DL
CREAT SPEC-SCNC: 170 MG/DL
CREAT/PROT UR: 0.3 RATIO
EOSINOPHIL # BLD AUTO: 0.06 K/UL
EOSINOPHIL NFR BLD AUTO: 1.6 %
GLUCOSE QUALITATIVE U: NEGATIVE
GLUCOSE SERPL-MCNC: 278 MG/DL
HCT VFR BLD CALC: 31.3 %
HGB BLD-MCNC: 9.4 G/DL
HYALINE CASTS: 0 /LPF
IMM GRANULOCYTES NFR BLD AUTO: 6.8 %
KETONES URINE: NEGATIVE
LEUKOCYTE ESTERASE URINE: NEGATIVE
LYMPHOCYTES # BLD AUTO: 1.07 K/UL
LYMPHOCYTES NFR BLD AUTO: 29.2 %
MAN DIFF?: NORMAL
MCHC RBC-ENTMCNC: 30 GM/DL
MCHC RBC-ENTMCNC: 30 PG
MCV RBC AUTO: 100 FL
MICROSCOPIC-UA: NORMAL
MONOCYTES # BLD AUTO: 0.24 K/UL
MONOCYTES NFR BLD AUTO: 6.6 %
NEUTROPHILS # BLD AUTO: 2.02 K/UL
NEUTROPHILS NFR BLD AUTO: 55.3 %
NITRITE URINE: NEGATIVE
PH URINE: 6
PLATELET # BLD AUTO: 137 K/UL
POTASSIUM SERPL-SCNC: 4.9 MMOL/L
PROT SERPL-MCNC: 6.1 G/DL
PROT UR-MCNC: 48 MG/DL
PROTEIN URINE: ABNORMAL
RBC # BLD: 3.13 M/UL
RBC # FLD: 14.2 %
RED BLOOD CELLS URINE: >720 /HPF
SODIUM SERPL-SCNC: 139 MMOL/L
SPECIFIC GRAVITY URINE: 1.02
SQUAMOUS EPITHELIAL CELLS: 0 /HPF
TACROLIMUS SERPL-MCNC: 5.2 NG/ML
UROBILINOGEN URINE: NORMAL
WBC # FLD AUTO: 3.66 K/UL
WHITE BLOOD CELLS URINE: 3 /HPF

## 2020-02-21 ENCOUNTER — FORM ENCOUNTER (OUTPATIENT)
Age: 70
End: 2020-02-21

## 2020-02-22 ENCOUNTER — APPOINTMENT (OUTPATIENT)
Dept: ULTRASOUND IMAGING | Facility: IMAGING CENTER | Age: 70
End: 2020-02-22
Payer: MEDICARE

## 2020-02-22 ENCOUNTER — OUTPATIENT (OUTPATIENT)
Dept: OUTPATIENT SERVICES | Facility: HOSPITAL | Age: 70
LOS: 1 days | End: 2020-02-22

## 2020-02-22 DIAGNOSIS — I77.0 ARTERIOVENOUS FISTULA, ACQUIRED: Chronic | ICD-10-CM

## 2020-02-22 DIAGNOSIS — Z94.0 KIDNEY TRANSPLANT STATUS: ICD-10-CM

## 2020-02-22 DIAGNOSIS — Z90.5 ACQUIRED ABSENCE OF KIDNEY: Chronic | ICD-10-CM

## 2020-02-22 DIAGNOSIS — Z98.890 OTHER SPECIFIED POSTPROCEDURAL STATES: Chronic | ICD-10-CM

## 2020-02-22 DIAGNOSIS — Z94.0 KIDNEY TRANSPLANT STATUS: Chronic | ICD-10-CM

## 2020-02-22 PROCEDURE — 76776 US EXAM K TRANSPL W/DOPPLER: CPT | Mod: 26,RT

## 2020-02-24 ENCOUNTER — INPATIENT (INPATIENT)
Facility: HOSPITAL | Age: 70
LOS: 3 days | Discharge: HOME | DRG: 660 | End: 2020-02-28
Attending: TRANSPLANT SURGERY | Admitting: TRANSPLANT SURGERY
Payer: MEDICARE

## 2020-02-24 VITALS
SYSTOLIC BLOOD PRESSURE: 137 MMHG | HEART RATE: 78 BPM | TEMPERATURE: 99 F | DIASTOLIC BLOOD PRESSURE: 71 MMHG | RESPIRATION RATE: 16 BRPM | OXYGEN SATURATION: 98 % | WEIGHT: 204.59 LBS | HEIGHT: 70 IN

## 2020-02-24 DIAGNOSIS — Z90.5 ACQUIRED ABSENCE OF KIDNEY: Chronic | ICD-10-CM

## 2020-02-24 DIAGNOSIS — Z94.0 KIDNEY TRANSPLANT STATUS: ICD-10-CM

## 2020-02-24 DIAGNOSIS — I77.0 ARTERIOVENOUS FISTULA, ACQUIRED: Chronic | ICD-10-CM

## 2020-02-24 DIAGNOSIS — Z94.0 KIDNEY TRANSPLANT STATUS: Chronic | ICD-10-CM

## 2020-02-24 DIAGNOSIS — Z98.890 OTHER SPECIFIED POSTPROCEDURAL STATES: Chronic | ICD-10-CM

## 2020-02-24 LAB
ANION GAP SERPL CALC-SCNC: 12 MMOL/L — SIGNIFICANT CHANGE UP (ref 5–17)
APTT BLD: 30.8 SEC — SIGNIFICANT CHANGE UP (ref 27.5–36.3)
BLD GP AB SCN SERPL QL: NEGATIVE — SIGNIFICANT CHANGE UP
BUN SERPL-MCNC: 25 MG/DL — HIGH (ref 7–23)
CALCIUM SERPL-MCNC: 10.2 MG/DL — SIGNIFICANT CHANGE UP (ref 8.4–10.5)
CHLORIDE SERPL-SCNC: 107 MMOL/L — SIGNIFICANT CHANGE UP (ref 96–108)
CO2 SERPL-SCNC: 20 MMOL/L — LOW (ref 22–31)
CREAT SERPL-MCNC: 2.66 MG/DL — HIGH (ref 0.5–1.3)
GLUCOSE BLDC GLUCOMTR-MCNC: 72 MG/DL — SIGNIFICANT CHANGE UP (ref 70–99)
GLUCOSE SERPL-MCNC: 70 MG/DL — SIGNIFICANT CHANGE UP (ref 70–99)
INR BLD: 0.97 RATIO — SIGNIFICANT CHANGE UP (ref 0.88–1.16)
MAGNESIUM SERPL-MCNC: 1.5 MG/DL — LOW (ref 1.6–2.6)
PHOSPHATE SERPL-MCNC: 3.2 MG/DL — SIGNIFICANT CHANGE UP (ref 2.5–4.5)
POTASSIUM SERPL-MCNC: 4.2 MMOL/L — SIGNIFICANT CHANGE UP (ref 3.5–5.3)
POTASSIUM SERPL-SCNC: 4.2 MMOL/L — SIGNIFICANT CHANGE UP (ref 3.5–5.3)
PROTHROM AB SERPL-ACNC: 11.1 SEC — SIGNIFICANT CHANGE UP (ref 10–12.9)
RH IG SCN BLD-IMP: POSITIVE — SIGNIFICANT CHANGE UP
SODIUM SERPL-SCNC: 139 MMOL/L — SIGNIFICANT CHANGE UP (ref 135–145)

## 2020-02-24 PROCEDURE — 99222 1ST HOSP IP/OBS MODERATE 55: CPT

## 2020-02-24 PROCEDURE — 99223 1ST HOSP IP/OBS HIGH 75: CPT | Mod: GC,24

## 2020-02-24 RX ORDER — SODIUM CHLORIDE 9 MG/ML
1000 INJECTION, SOLUTION INTRAVENOUS
Refills: 0 | Status: DISCONTINUED | OUTPATIENT
Start: 2020-02-24 | End: 2020-02-28

## 2020-02-24 RX ORDER — TAMSULOSIN HYDROCHLORIDE 0.4 MG/1
0.4 CAPSULE ORAL AT BEDTIME
Refills: 0 | Status: DISCONTINUED | OUTPATIENT
Start: 2020-02-24 | End: 2020-02-28

## 2020-02-24 RX ORDER — INSULIN LISPRO 100/ML
VIAL (ML) SUBCUTANEOUS
Refills: 0 | Status: DISCONTINUED | OUTPATIENT
Start: 2020-02-24 | End: 2020-02-28

## 2020-02-24 RX ORDER — GLUCAGON INJECTION, SOLUTION 0.5 MG/.1ML
1 INJECTION, SOLUTION SUBCUTANEOUS ONCE
Refills: 0 | Status: DISCONTINUED | OUTPATIENT
Start: 2020-02-24 | End: 2020-02-28

## 2020-02-24 RX ORDER — DEXTROSE 50 % IN WATER 50 %
12.5 SYRINGE (ML) INTRAVENOUS ONCE
Refills: 0 | Status: DISCONTINUED | OUTPATIENT
Start: 2020-02-24 | End: 2020-02-28

## 2020-02-24 RX ORDER — HEPARIN SODIUM 5000 [USP'U]/ML
5000 INJECTION INTRAVENOUS; SUBCUTANEOUS EVERY 12 HOURS
Refills: 0 | Status: DISCONTINUED | OUTPATIENT
Start: 2020-02-24 | End: 2020-02-25

## 2020-02-24 RX ORDER — NIFEDIPINE 30 MG
30 TABLET, EXTENDED RELEASE 24 HR ORAL DAILY
Refills: 0 | Status: DISCONTINUED | OUTPATIENT
Start: 2020-02-24 | End: 2020-02-28

## 2020-02-24 RX ORDER — ALLOPURINOL 300 MG
100 TABLET ORAL DAILY
Refills: 0 | Status: DISCONTINUED | OUTPATIENT
Start: 2020-02-24 | End: 2020-02-28

## 2020-02-24 RX ORDER — DEXTROSE 50 % IN WATER 50 %
25 SYRINGE (ML) INTRAVENOUS ONCE
Refills: 0 | Status: DISCONTINUED | OUTPATIENT
Start: 2020-02-24 | End: 2020-02-28

## 2020-02-24 RX ORDER — FAMOTIDINE 10 MG/ML
20 INJECTION INTRAVENOUS DAILY
Refills: 0 | Status: DISCONTINUED | OUTPATIENT
Start: 2020-02-24 | End: 2020-02-28

## 2020-02-24 RX ORDER — METOPROLOL TARTRATE 50 MG
100 TABLET ORAL DAILY
Refills: 0 | Status: DISCONTINUED | OUTPATIENT
Start: 2020-02-24 | End: 2020-02-28

## 2020-02-24 RX ORDER — CALCIUM CARBONATE 500(1250)
1 TABLET ORAL THREE TIMES A DAY
Refills: 0 | Status: DISCONTINUED | OUTPATIENT
Start: 2020-02-24 | End: 2020-02-28

## 2020-02-24 RX ORDER — GABAPENTIN 400 MG/1
300 CAPSULE ORAL DAILY
Refills: 0 | Status: DISCONTINUED | OUTPATIENT
Start: 2020-02-24 | End: 2020-02-28

## 2020-02-24 RX ORDER — DIPHENOXYLATE HCL/ATROPINE 2.5-.025MG
1 TABLET ORAL AT BEDTIME
Refills: 0 | Status: DISCONTINUED | OUTPATIENT
Start: 2020-02-24 | End: 2020-02-25

## 2020-02-24 RX ORDER — MYCOPHENOLATE MOFETIL 250 MG/1
1000 CAPSULE ORAL
Refills: 0 | Status: DISCONTINUED | OUTPATIENT
Start: 2020-02-24 | End: 2020-02-28

## 2020-02-24 RX ORDER — DEXTROSE 50 % IN WATER 50 %
15 SYRINGE (ML) INTRAVENOUS ONCE
Refills: 0 | Status: DISCONTINUED | OUTPATIENT
Start: 2020-02-24 | End: 2020-02-28

## 2020-02-24 RX ORDER — TACROLIMUS 5 MG/1
6 CAPSULE ORAL DAILY
Refills: 0 | Status: DISCONTINUED | OUTPATIENT
Start: 2020-02-24 | End: 2020-02-26

## 2020-02-24 RX ORDER — INSULIN LISPRO 100/ML
VIAL (ML) SUBCUTANEOUS AT BEDTIME
Refills: 0 | Status: DISCONTINUED | OUTPATIENT
Start: 2020-02-24 | End: 2020-02-28

## 2020-02-24 RX ADMIN — GABAPENTIN 300 MILLIGRAM(S): 400 CAPSULE ORAL at 23:31

## 2020-02-24 RX ADMIN — TAMSULOSIN HYDROCHLORIDE 0.4 MILLIGRAM(S): 0.4 CAPSULE ORAL at 23:31

## 2020-02-24 RX ADMIN — Medication 1 TABLET(S): at 23:31

## 2020-02-24 RX ADMIN — FAMOTIDINE 20 MILLIGRAM(S): 10 INJECTION INTRAVENOUS at 23:31

## 2020-02-24 RX ADMIN — MYCOPHENOLATE MOFETIL 1000 MILLIGRAM(S): 250 CAPSULE ORAL at 23:30

## 2020-02-24 RX ADMIN — Medication 1 TABLET(S): at 23:30

## 2020-02-24 NOTE — H&P ADULT - NSHPPHYSICALEXAM_GEN_ALL_CORE
Gen: WD, WN, NAD  HEENT: PERRLA, EOMI, Oropharynx clear  Neck: Supple, no JVD/Bruit, No thyromegaly  Lungs: CTA B/L  Heart: RRR, S1 S2, No m/r/g  Abd: Soft, ND, NT, No HSM, No rebound or guarding, RLQ incision well healed  Ext: WWP, No clubbing, cyanosis, or edema, palp DP b/l  Neuro: AAOx3, CN II-XII grossly intact, No focal deficits

## 2020-02-24 NOTE — H&P ADULT - ASSESSMENT
69yM s/p DDRT for ESRD now w/ elevated creatinine.    - Admit to Transplant surgery, Dr. Marie  - CBC, BMP, UA  - Bladder scan  - Monitor UOP  - Resume home Inversus 6mg QD, MMF 1g BID, and Pred 5 QD  - Resume other home meds  - Pt discussed w/ Dr. Marie  - Please page [phone] w/ any questions    PHIL Cohen

## 2020-02-24 NOTE — H&P ADULT - NSICDXPASTSURGICALHX_GEN_ALL_CORE_FT
PAST SURGICAL HISTORY:  AV fistula 2013/ left forearm    H/O ileostomy 2017   for 3 months. s/p Reversal    Kidney transplant recipient 2018  @ [facility] :  +  Hep C Donor    S/p nephrectomy left 9/15/2015   + Cancer    S/p nephrectomy right 12/10/2015   benign    S/P right knee arthroscopy

## 2020-02-24 NOTE — H&P ADULT - HISTORY OF PRESENT ILLNESS
69yM w/ PMH sig for HTN, DM, ESRD 2/2 bilateral nephrectomy from neoplasm, s/p HCV + DDRT (7/18) c/b DGF, ATN/mild rejection (8/2017 treated with steroids) and perinephric collection requiring drainage, 2/19/19 ureteral stent removal; previous admission in (2/2019) with E coli bacteremia 2/2 urosepsis treated w/ IV vanc/cefepime/zosyn. He was started on Valcyte 4/19 as outpatient for +CMV. Recently admitted in 05/2019 w/ severe CHELA 2/2 FK toxicity (level on admission was 39). Pt now presenting from clinic appt w/ Dr. Bhakta after uptredning Cr on outpatient labs (3.5). Pt also c/o frequent diarrhea for which he was advised to take Lomotil PRN. The patient denies fever, chills; chest pain, SOB, palpitation; dizziness, weakness; nausea, vomiting; constipation; abdominal pain; dysuria, hematuria; leg swelling; sick contacts; and recent travel.

## 2020-02-25 LAB
ANION GAP SERPL CALC-SCNC: 11 MMOL/L — SIGNIFICANT CHANGE UP (ref 5–17)
APPEARANCE UR: CLEAR — SIGNIFICANT CHANGE UP
BILIRUB UR-MCNC: NEGATIVE — SIGNIFICANT CHANGE UP
BUN SERPL-MCNC: 26 MG/DL — HIGH (ref 7–23)
CALCIUM SERPL-MCNC: 9.8 MG/DL — SIGNIFICANT CHANGE UP (ref 8.4–10.5)
CHLORIDE SERPL-SCNC: 108 MMOL/L — SIGNIFICANT CHANGE UP (ref 96–108)
CO2 SERPL-SCNC: 21 MMOL/L — LOW (ref 22–31)
COLOR SPEC: SIGNIFICANT CHANGE UP
CREAT SERPL-MCNC: 2.7 MG/DL — HIGH (ref 0.5–1.3)
DIFF PNL FLD: ABNORMAL
GLUCOSE BLDC GLUCOMTR-MCNC: 128 MG/DL — HIGH (ref 70–99)
GLUCOSE BLDC GLUCOMTR-MCNC: 132 MG/DL — HIGH (ref 70–99)
GLUCOSE BLDC GLUCOMTR-MCNC: 134 MG/DL — HIGH (ref 70–99)
GLUCOSE SERPL-MCNC: 112 MG/DL — HIGH (ref 70–99)
GLUCOSE UR QL: NEGATIVE — SIGNIFICANT CHANGE UP
HCT VFR BLD CALC: 32.3 % — LOW (ref 39–50)
HCT VFR BLD CALC: 32.7 % — LOW (ref 39–50)
HGB BLD-MCNC: 9.7 G/DL — LOW (ref 13–17)
HGB BLD-MCNC: 9.9 G/DL — LOW (ref 13–17)
KETONES UR-MCNC: NEGATIVE — SIGNIFICANT CHANGE UP
LEUKOCYTE ESTERASE UR-ACNC: ABNORMAL
MAGNESIUM SERPL-MCNC: 1.6 MG/DL — SIGNIFICANT CHANGE UP (ref 1.6–2.6)
MCHC RBC-ENTMCNC: 29.7 PG — SIGNIFICANT CHANGE UP (ref 27–34)
MCHC RBC-ENTMCNC: 29.8 PG — SIGNIFICANT CHANGE UP (ref 27–34)
MCHC RBC-ENTMCNC: 30 GM/DL — LOW (ref 32–36)
MCHC RBC-ENTMCNC: 30.3 GM/DL — LOW (ref 32–36)
MCV RBC AUTO: 98.2 FL — SIGNIFICANT CHANGE UP (ref 80–100)
MCV RBC AUTO: 99.1 FL — SIGNIFICANT CHANGE UP (ref 80–100)
NITRITE UR-MCNC: NEGATIVE — SIGNIFICANT CHANGE UP
NRBC # BLD: 0 /100 WBCS — SIGNIFICANT CHANGE UP (ref 0–0)
NRBC # BLD: 0 /100 WBCS — SIGNIFICANT CHANGE UP (ref 0–0)
PH UR: 5.5 — SIGNIFICANT CHANGE UP (ref 5–8)
PHOSPHATE SERPL-MCNC: 3.8 MG/DL — SIGNIFICANT CHANGE UP (ref 2.5–4.5)
PLATELET # BLD AUTO: 103 K/UL — LOW (ref 150–400)
PLATELET # BLD AUTO: 114 K/UL — LOW (ref 150–400)
POTASSIUM SERPL-MCNC: 4.1 MMOL/L — SIGNIFICANT CHANGE UP (ref 3.5–5.3)
POTASSIUM SERPL-SCNC: 4.1 MMOL/L — SIGNIFICANT CHANGE UP (ref 3.5–5.3)
PROT UR-MCNC: ABNORMAL
RBC # BLD: 3.26 M/UL — LOW (ref 4.2–5.8)
RBC # BLD: 3.33 M/UL — LOW (ref 4.2–5.8)
RBC # FLD: 14.5 % — SIGNIFICANT CHANGE UP (ref 10.3–14.5)
RBC # FLD: 14.6 % — HIGH (ref 10.3–14.5)
SODIUM SERPL-SCNC: 140 MMOL/L — SIGNIFICANT CHANGE UP (ref 135–145)
SP GR SPEC: 1.02 — SIGNIFICANT CHANGE UP (ref 1.01–1.02)
TACROLIMUS SERPL-MCNC: 4.5 NG/ML — SIGNIFICANT CHANGE UP
UROBILINOGEN FLD QL: NEGATIVE — SIGNIFICANT CHANGE UP
WBC # BLD: 3.6 K/UL — LOW (ref 3.8–10.5)
WBC # BLD: 4.4 K/UL — SIGNIFICANT CHANGE UP (ref 3.8–10.5)
WBC # FLD AUTO: 3.6 K/UL — LOW (ref 3.8–10.5)
WBC # FLD AUTO: 4.4 K/UL — SIGNIFICANT CHANGE UP (ref 3.8–10.5)

## 2020-02-25 PROCEDURE — 50432 PLMT NEPHROSTOMY CATHETER: CPT | Mod: RT

## 2020-02-25 PROCEDURE — 99233 SBSQ HOSP IP/OBS HIGH 50: CPT | Mod: GC,24

## 2020-02-25 RX ORDER — DIPHENOXYLATE HCL/ATROPINE 2.5-.025MG
1 TABLET ORAL DAILY
Refills: 0 | Status: DISCONTINUED | OUTPATIENT
Start: 2020-02-25 | End: 2020-02-28

## 2020-02-25 RX ADMIN — Medication 1 TABLET(S): at 21:31

## 2020-02-25 RX ADMIN — TACROLIMUS 6 MILLIGRAM(S): 5 CAPSULE ORAL at 09:21

## 2020-02-25 RX ADMIN — MYCOPHENOLATE MOFETIL 1000 MILLIGRAM(S): 250 CAPSULE ORAL at 05:53

## 2020-02-25 RX ADMIN — Medication 1 TABLET(S): at 13:02

## 2020-02-25 RX ADMIN — Medication 1 TABLET(S): at 05:59

## 2020-02-25 RX ADMIN — Medication 100 MILLIGRAM(S): at 05:54

## 2020-02-25 RX ADMIN — FAMOTIDINE 20 MILLIGRAM(S): 10 INJECTION INTRAVENOUS at 13:03

## 2020-02-25 RX ADMIN — GABAPENTIN 300 MILLIGRAM(S): 400 CAPSULE ORAL at 13:03

## 2020-02-25 RX ADMIN — TAMSULOSIN HYDROCHLORIDE 0.4 MILLIGRAM(S): 0.4 CAPSULE ORAL at 21:31

## 2020-02-25 RX ADMIN — Medication 30 MILLIGRAM(S): at 05:53

## 2020-02-25 RX ADMIN — Medication 1 TABLET(S): at 13:03

## 2020-02-25 RX ADMIN — Medication 1 TABLET(S): at 05:53

## 2020-02-25 RX ADMIN — MYCOPHENOLATE MOFETIL 1000 MILLIGRAM(S): 250 CAPSULE ORAL at 18:48

## 2020-02-25 RX ADMIN — Medication 100 MILLIGRAM(S): at 13:03

## 2020-02-25 RX ADMIN — HEPARIN SODIUM 5000 UNIT(S): 5000 INJECTION INTRAVENOUS; SUBCUTANEOUS at 05:53

## 2020-02-25 RX ADMIN — Medication 5 MILLIGRAM(S): at 05:53

## 2020-02-25 NOTE — PRE-ANESTHESIA EVALUATION ADULT - LAST ECHOCARDIOGRAM
' 2018 prior to Kidney Transplant ( [facility]); 2016 TTE: EF 55-60%, dilatation of Ao root

## 2020-02-25 NOTE — PROGRESS NOTE ADULT - SUBJECTIVE AND OBJECTIVE BOX
Interventional Radiology Pre-Procedure Note    This is a 69y M with a hydronephrotic RLQ renal transplant presenting for a nephrostomy tube insertion.     Procedure: RLQ renal transplant nephrostomy tube insertion    Diagnosis/Indication: Patient is a 69y old  Male who presents with a chief complaint of Elevated creatinine s/p DDRT (2020 11:57)      PAST MEDICAL & SURGICAL HISTORY:  Medial meniscus tear: &#x27; 90&#x27;s  Right  Bowel obstruction: &#x27; 2017   surgically treated  Anal fissure: dx: &#x27; 2018   No surgery  Benign prostatic hypertrophy  Hepatitis C: In Donor Kidney: patient received treatment for Hep. C : post treatment: patient  tested Negative for Hep C  Kidney neoplasm: dx: &#x27;  : Left      s/p bilateral Nephrectomy &#x27;   ESRD (end stage renal disease): On Dialysis &#x27;  to 2018  Type 2 diabetes mellitus: dx: &#x27;88  HTN (hypertension)  Kidney transplant recipient: 2018  @ [facility] :  +  Hep C Donor  S/P right knee arthroscopy: &#x27; 90&#x27;s  H/O ileostomy: &#x27; 2017   for 3 months. 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       Male    Allergies: No Known Allergies      LABS:  CBC Full  -  ( 2020 06:04 )  WBC Count : 3.60 K/uL  RBC Count : 3.26 M/uL  Hemoglobin : 9.7 g/dL  Hematocrit : 32.3 %  Platelet Count - Automated : 114 K/uL  Mean Cell Volume : 99.1 fl  Mean Cell Hemoglobin : 29.8 pg  Mean Cell Hemoglobin Concentration : 30.0 gm/dL        140  |  108  |  26<H>  ----------------------------<  112<H>  4.1   |  21<L>  |  2.70<H>    Ca    9.8      2020 06:04  Phos  3.8     02-25  Mg     1.6           PT/INR - ( 2020 22:40 )   PT: 11.1 sec;   INR: 0.97 ratio         PTT - ( 2020 22:40 )  PTT:30.8 sec    Plan:  -RLQ renal transplant nephrostomy tube insertion.  -Procedure/ risks/ benefits were explained, informed consent obtained from patient, verbalizes understanding.

## 2020-02-25 NOTE — PROGRESS NOTE ADULT - ASSESSMENT
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 readmitted in 05/2018 w/ severe CHELA 2/2 FK toxicity (level on admission was 39), +UA, was treated w/ cefepime. Also was readmitted in 8/2019 w/ uptrending Cr on outpatient labs (3.4), He was  treated with pulse steroid for ACR 1B. Pt now presenting as direct admit with uptredning Cr on outpatient labs (3.5).          Problem/Plan -     [] CHELA, Hydronephrosis   - Plan for IR PCN placement today      [] S/P DDRT 7/2018  - Immuno: Envarsus, MMF, Pred, bactrim  - Fk level daily   - Strict I/Os  - NPO for IR post IR can start regular diet.   - Continue flomax  - Continue home meds allopurinol, gabapentin, pepcid, calcium carbonate  - Colorectal consult with Dr. Porras for rectal fissures and hemorrhoids   - Send blood and urine cultures    [] DM  - Continue ISS monitor BG    [] HTN  - BP controlled  - Continue Nifedipine 30 QD, Lopressor 100mg QD

## 2020-02-25 NOTE — CHART NOTE - NSCHARTNOTEFT_GEN_A_CORE
Interventional Radiology Procedure Note    Procedure: Transplant percutaneous nephrostomy tube.    Indication: Acute kidney injury, hydronephrosis.    Operators: Dr. Dimitri Johnson MD    Anesthesia (type): Sedation per anesthesia. Local lidocaine 1%     Contrast: iodinate contrast diluted with normal saline.    EBL: ~50cc    Findings/Follow up Plan of Care: Bed rest and pain control. Pre-procedural orders per primary team.    Specimens Removed: None.    Implants: J-tube placement in right lower quadrant transplant kidney.    Complications: No immediate.    Condition/Disposition: Dispo to IRS    Please call Interventional Radiology x [phone] with any questions, concerns, or issues.

## 2020-02-25 NOTE — CHART NOTE - NSCHARTNOTEFT_GEN_A_CORE
Interventional Radiology Pre-Procedure Note    Procedure: Transplant percutaneous nephrostomy     Diagnosis/Indication: Patient is a 69y old Male who presents with a chief complaint of elevated creatinine and hydronephrosis s/p renal transplant in . IR requested for transplant PCN.     NPO: since midnight.  Anti-coagulation: SQ heparin @ 0530  Antibiotics: PO bactrim @ 1300    PAST MEDICAL & SURGICAL HISTORY:  Medial meniscus tear: &#x27; 90&#x27;s  Right  Bowel obstruction: &#x27; 2017   surgically treated  Anal fissure: dx: &#x27; 2018   No surgery  Benign prostatic hypertrophy  Hepatitis C: In Donor Kidney: patient received treatment for Hep. C : post treatment: patient  tested Negative for Hep C  Kidney neoplasm: dx: &#x27;  : Left      s/p bilateral Nephrectomy &#x27;   ESRD (end stage renal disease): On Dialysis &#x27;  to 2018  Type 2 diabetes mellitus: dx: &#x27;88  HTN (hypertension)  Kidney transplant recipient: 2018  @ [facility] :  +  Hep C Donor  S/P right knee arthroscopy: &#x27; 90&#x27;s  H/O ileostomy: &#x27; 2017   for 3 months. 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     Allergies: No Known Allergies    LABS:  CBC Full  -  ( 2020 06:04 )  WBC Count : 3.60 K/uL  RBC Count : 3.26 M/uL  Hemoglobin : 9.7 g/dL  Hematocrit : 32.3 %  Platelet Count - Automated : 114 K/uL  Mean Cell Volume : 99.1 fl  Mean Cell Hemoglobin : 29.8 pg  Mean Cell Hemoglobin Concentration : 30.0 gm/dL  Auto Neutrophil # : x  Auto Lymphocyte # : x  Auto Monocyte # : x  Auto Eosinophil # : x  Auto Basophil # : x  Auto Neutrophil % : x  Auto Lymphocyte % : x  Auto Monocyte % : x  Auto Eosinophil % : x  Auto Basophil % : x    -    140  |  108  |  26<H>  ----------------------------<  112<H>  4.1   |  21<L>  |  2.70<H>    Ca    9.8      2020 06:04  Phos  3.8     02-25  Mg     1.6     02-25      PT/INR - ( 2020 22:40 )   PT: 11.1 sec;   INR: 0.97 ratio       PTT - ( 2020 22:40 )  PTT:30.8 sec    Plan is for transplant percutaneous nephrostomy. Procedure/ risks/ benefits were explained, informed consent obtained from patient, verbalizes understanding.

## 2020-02-25 NOTE — PROGRESS NOTE ADULT - SUBJECTIVE AND OBJECTIVE BOX
Transplant Surgery - Multidisciplinary Rounds  --------------------------------------------------------------  R DDRT 2018- Admitted for CHELA,  hydronephrosis       Present:   Patient seen with multidisciplinary team including Transplant Surgeon: Dr. Russo, Dr. Marie, Dr. Castaneda, Dr. Heath. Nephrologist Dr. Multani, Dr. Bhakta. V, Dr. Cruz. Transplant Coordinators: Reinier Reynoso, Karena Mendez, Bridget Vázquez. Social Work: Antonia Tolbert. Pharmacist: Noa aRphael. Nutrition: Annette Villanueva. Quality management Ronal Parks. NP/PA: Margarito Thomas, PGY 3 Dr. Farmer, Dr. Connolly, RN manager, and bedside RN  and examined with Dr. Marie. Disciplines not in attendance will be notified of the plan.     HPI: 70 y/o/M with a PMH of ESRD 2/2 bilateral nephrectomy from neoplasm, hypertension, and NIDDM s/p HCV + DDRT (2018) c/b DGF, ATN/mild rejection (2018 treated with steroids) and perinephric collection requiring drainage,previous admission in (2019) with E coli bacteremia 2/2 urosepsis treated w/ IV vanc/cefepime/zosyn.  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 readmitted in 2018 w/ severe CHELA 2/2 FK toxicity (level on admission was 39), +UA, was treated w/ cefepime. Also was readmitted in 2019 w/ uptrending Cr on outpatient labs (3.4), He was  treated with pulse steroid for ACR 1B. Pt now presenting as direct admit with uptredning Cr on outpatient labs (3.5). Pt also c/o frequent diarrhea for which he was advised to take Lomotil PRN. The patient denies fever, chills; chest pain, SOB, palpitation; dizziness, weakness; nausea, vomiting; constipation; abdominal pain; dysuria, hematuria; leg swelling; sick contacts; and recent travel.    Interval Events:    Admitted as direct admit overnight  US outpt  noted with hydronephrosis  Restarted on home medications  Afebrile      Potential Discharge date: Pending daily evaluation    Education:  Medications    Plan of care:  See Below      MEDICATIONS  (STANDING):  allopurinol 100 milliGRAM(s) Oral daily  calcium carbonate    500 mG (Tums) Chewable 1 Tablet(s) Chew three times a day  dextrose 5%. 1000 milliLiter(s) (50 mL/Hr) IV Continuous <Continuous>  dextrose 50% Injectable 12.5 Gram(s) IV Push once  dextrose 50% Injectable 25 Gram(s) IV Push once  dextrose 50% Injectable 25 Gram(s) IV Push once  famotidine    Tablet 20 milliGRAM(s) Oral daily  gabapentin 300 milliGRAM(s) Oral daily  insulin lispro (HumaLOG) corrective regimen sliding scale   SubCutaneous three times a day before meals  insulin lispro (HumaLOG) corrective regimen sliding scale   SubCutaneous at bedtime  metoprolol succinate  milliGRAM(s) Oral daily  multivitamin 1 Tablet(s) Oral daily  mycophenolate mofetil 1000 milliGRAM(s) Oral two times a day  NIFEdipine XL 30 milliGRAM(s) Oral daily  predniSONE   Tablet 5 milliGRAM(s) Oral daily  tacrolimus ER Tablet (ENVARSUS XR) 6 milliGRAM(s) Oral daily  tamsulosin 0.4 milliGRAM(s) Oral at bedtime  trimethoprim   80 mG/sulfamethoxazole 400 mG 1 Tablet(s) Oral daily    MEDICATIONS  (PRN):  dextrose 40% Gel 15 Gram(s) Oral once PRN Blood Glucose LESS THAN 70 milliGRAM(s)/deciLiter  diphenoxylate/atropine 1 Tablet(s) Oral at bedtime PRN Diarrhea  glucagon  Injectable 1 milliGRAM(s) IntraMuscular once PRN Glucose <70 milliGRAM(s)/deciLiter      PAST MEDICAL & SURGICAL HISTORY:  Medial meniscus tear: &#x27; 90&#x27;s  Right  Bowel obstruction: &#x27; 2017   surgically treated  Anal fissure: dx: &#x27; 2018   No surgery  Benign prostatic hypertrophy  Hepatitis C: In Donor Kidney: patient received treatment for Hep. C : post treatment: patient  tested Negative for Hep C  Kidney neoplasm: dx: &#x27;  : Left      s/p bilateral Nephrectomy &#x27;   ESRD (end stage renal disease): On Dialysis &#x27;  to 2018  Type 2 diabetes mellitus: dx: &#x27;88  HTN (hypertension)  Kidney transplant recipient: 2018  @ [facility] :  +  Hep C Donor  S/P right knee arthroscopy: &#x27; 90&#x27;s  H/O ileostomy: &#x27; 2017   for 3 months. 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      Vital Signs Last 24 Hrs  T(C): 36.7 (2020 09:00), Max: 37.4 (2020 21:36)  T(F): 98.1 (2020 09:00), Max: 99.4 (2020 21:36)  HR: 74 (2020 09:00) (60 - 78)  BP: 159/78 (2020 09:00) (137/71 - 159/78)  BP(mean): --  RR: 20 (2020 09:00) (16 - 20)  SpO2: 98% (2020 09:00) (98% - 100%)    I&O's Summary    2020 07:01  -  2020 07:00  --------------------------------------------------------  IN: 250 mL / OUT: 320 mL / NET: -70 mL                       9.7    3.60  )-----------( 114      ( 2020 06:04 )             32.3     02-25    140  |  108  |  26<H>  ----------------------------<  112<H>  4.1   |  21<L>  |  2.70<H>    Ca    9.8      2020 06:04  Phos  3.8     02-25  Mg     1.6     02-25      Tacrolimus (), Serum: 4.5 ng/mL ( @ 07:31)      Review of systems  Gen: No weight changes, fatigue, fevers/chills, weakness  Skin: No rashes  Head/Eyes/Ears/Mouth: No headache; Normal hearing; Normal vision w/o blurriness; No sinus pain/discomfort, sore throat  Respiratory: No dyspnea, cough, wheezing, hemoptysis  CV: No chest pain, PND, orthopnea  GI: No  abdominal pain at surgical site, No diarrhea, constipation, nausea, vomiting, melena, hematochezia  : No increased frequency, dysuria, hematuria, nocturia  MSK: No joint pain/swelling; no back pain; no edema  Neuro: No dizziness/lightheadedness, weakness, seizures, numbness, tingling  Heme: No easy bruising or bleeding  Endo: No heat/cold intolerance  Psych: No significant nervousness, anxiety, stress, depression  All other systems were reviewed and are negative, except as noted.      PHYSICAL EXAM:  Constitutional: Well developed / well nourished  Eyes: Anicteric, PERRLA  ENMT: nc/at  Neck: supple  Respiratory: CTA B/L  Cardiovascular: RRR  Gastrointestinal: Soft abdomen, non tender to touch  ND  Genitourinary: Voiding spontaneously  Extremities: no edema, scds in place  Vascular: Palpable dp pulses bilaterally  Neurological: A&O x3  Skin: no rashes or lesions   Musculoskeletal: Moving all extremities  Psychiatric: Responsive

## 2020-02-26 LAB
ANION GAP SERPL CALC-SCNC: 13 MMOL/L — SIGNIFICANT CHANGE UP (ref 5–17)
BUN SERPL-MCNC: 24 MG/DL — HIGH (ref 7–23)
CALCIUM SERPL-MCNC: 9.7 MG/DL — SIGNIFICANT CHANGE UP (ref 8.4–10.5)
CHLORIDE SERPL-SCNC: 107 MMOL/L — SIGNIFICANT CHANGE UP (ref 96–108)
CO2 SERPL-SCNC: 18 MMOL/L — LOW (ref 22–31)
CREAT SERPL-MCNC: 2.58 MG/DL — HIGH (ref 0.5–1.3)
CULTURE RESULTS: SIGNIFICANT CHANGE UP
GLUCOSE BLDC GLUCOMTR-MCNC: 125 MG/DL — HIGH (ref 70–99)
GLUCOSE BLDC GLUCOMTR-MCNC: 159 MG/DL — HIGH (ref 70–99)
GLUCOSE BLDC GLUCOMTR-MCNC: 162 MG/DL — HIGH (ref 70–99)
GLUCOSE BLDC GLUCOMTR-MCNC: 170 MG/DL — HIGH (ref 70–99)
GLUCOSE SERPL-MCNC: 104 MG/DL — HIGH (ref 70–99)
HCT VFR BLD CALC: 33.7 % — LOW (ref 39–50)
HCT VFR BLD CALC: 36.4 % — LOW (ref 39–50)
HGB BLD-MCNC: 10.2 G/DL — LOW (ref 13–17)
HGB BLD-MCNC: 10.9 G/DL — LOW (ref 13–17)
MAGNESIUM SERPL-MCNC: 1.6 MG/DL — SIGNIFICANT CHANGE UP (ref 1.6–2.6)
MCHC RBC-ENTMCNC: 29.7 PG — SIGNIFICANT CHANGE UP (ref 27–34)
MCHC RBC-ENTMCNC: 29.8 PG — SIGNIFICANT CHANGE UP (ref 27–34)
MCHC RBC-ENTMCNC: 29.9 GM/DL — LOW (ref 32–36)
MCHC RBC-ENTMCNC: 30.3 GM/DL — LOW (ref 32–36)
MCV RBC AUTO: 98.3 FL — SIGNIFICANT CHANGE UP (ref 80–100)
MCV RBC AUTO: 99.5 FL — SIGNIFICANT CHANGE UP (ref 80–100)
NRBC # BLD: 0 /100 WBCS — SIGNIFICANT CHANGE UP (ref 0–0)
NRBC # BLD: 0 /100 WBCS — SIGNIFICANT CHANGE UP (ref 0–0)
PHOSPHATE SERPL-MCNC: 3.5 MG/DL — SIGNIFICANT CHANGE UP (ref 2.5–4.5)
PLATELET # BLD AUTO: 122 K/UL — LOW (ref 150–400)
PLATELET # BLD AUTO: SIGNIFICANT CHANGE UP K/UL (ref 150–400)
POTASSIUM SERPL-MCNC: 4.8 MMOL/L — SIGNIFICANT CHANGE UP (ref 3.5–5.3)
POTASSIUM SERPL-SCNC: 4.8 MMOL/L — SIGNIFICANT CHANGE UP (ref 3.5–5.3)
RBC # BLD: 3.43 M/UL — LOW (ref 4.2–5.8)
RBC # BLD: 3.66 M/UL — LOW (ref 4.2–5.8)
RBC # FLD: 14.6 % — HIGH (ref 10.3–14.5)
RBC # FLD: 14.6 % — HIGH (ref 10.3–14.5)
SODIUM SERPL-SCNC: 138 MMOL/L — SIGNIFICANT CHANGE UP (ref 135–145)
SPECIMEN SOURCE: SIGNIFICANT CHANGE UP
TACROLIMUS SERPL-MCNC: 10.3 NG/ML — SIGNIFICANT CHANGE UP
WBC # BLD: 3.37 K/UL — LOW (ref 3.8–10.5)
WBC # BLD: 3.6 K/UL — LOW (ref 3.8–10.5)
WBC # FLD AUTO: 3.37 K/UL — LOW (ref 3.8–10.5)
WBC # FLD AUTO: 3.6 K/UL — LOW (ref 3.8–10.5)

## 2020-02-26 PROCEDURE — 99233 SBSQ HOSP IP/OBS HIGH 50: CPT | Mod: GC,24

## 2020-02-26 PROCEDURE — 99231 SBSQ HOSP IP/OBS SF/LOW 25: CPT

## 2020-02-26 RX ORDER — TACROLIMUS 5 MG/1
5 CAPSULE ORAL
Refills: 0 | Status: DISCONTINUED | OUTPATIENT
Start: 2020-02-27 | End: 2020-02-28

## 2020-02-26 RX ORDER — CITRIC ACID/SODIUM CITRATE 300-500 MG
15 SOLUTION, ORAL ORAL THREE TIMES A DAY
Refills: 0 | Status: DISCONTINUED | OUTPATIENT
Start: 2020-02-26 | End: 2020-02-28

## 2020-02-26 RX ORDER — MAGNESIUM SULFATE 500 MG/ML
2 VIAL (ML) INJECTION ONCE
Refills: 0 | Status: COMPLETED | OUTPATIENT
Start: 2020-02-26 | End: 2020-02-26

## 2020-02-26 RX ORDER — MAGNESIUM SULFATE 500 MG/ML
2 VIAL (ML) INJECTION ONCE
Refills: 0 | Status: DISCONTINUED | OUTPATIENT
Start: 2020-02-26 | End: 2020-02-26

## 2020-02-26 RX ADMIN — MYCOPHENOLATE MOFETIL 1000 MILLIGRAM(S): 250 CAPSULE ORAL at 17:03

## 2020-02-26 RX ADMIN — Medication 1 TABLET(S): at 11:32

## 2020-02-26 RX ADMIN — Medication 1 TABLET(S): at 11:35

## 2020-02-26 RX ADMIN — Medication 1 TABLET(S): at 05:16

## 2020-02-26 RX ADMIN — Medication 1 TABLET(S): at 14:27

## 2020-02-26 RX ADMIN — MYCOPHENOLATE MOFETIL 1000 MILLIGRAM(S): 250 CAPSULE ORAL at 05:17

## 2020-02-26 RX ADMIN — Medication 50 GRAM(S): at 08:42

## 2020-02-26 RX ADMIN — TAMSULOSIN HYDROCHLORIDE 0.4 MILLIGRAM(S): 0.4 CAPSULE ORAL at 21:26

## 2020-02-26 RX ADMIN — Medication 2: at 16:51

## 2020-02-26 RX ADMIN — TACROLIMUS 6 MILLIGRAM(S): 5 CAPSULE ORAL at 07:44

## 2020-02-26 RX ADMIN — Medication 30 MILLIGRAM(S): at 05:17

## 2020-02-26 RX ADMIN — Medication 100 MILLIGRAM(S): at 05:17

## 2020-02-26 RX ADMIN — Medication 1 TABLET(S): at 21:41

## 2020-02-26 RX ADMIN — Medication 1 TABLET(S): at 21:26

## 2020-02-26 RX ADMIN — Medication 2: at 12:48

## 2020-02-26 RX ADMIN — Medication 50 GRAM(S): at 07:31

## 2020-02-26 RX ADMIN — FAMOTIDINE 20 MILLIGRAM(S): 10 INJECTION INTRAVENOUS at 11:31

## 2020-02-26 RX ADMIN — Medication 100 MILLIGRAM(S): at 11:31

## 2020-02-26 RX ADMIN — GABAPENTIN 300 MILLIGRAM(S): 400 CAPSULE ORAL at 11:32

## 2020-02-26 RX ADMIN — Medication 5 MILLIGRAM(S): at 05:17

## 2020-02-26 RX ADMIN — Medication 15 MILLILITER(S): at 22:30

## 2020-02-26 NOTE — PROGRESS NOTE ADULT - SUBJECTIVE AND OBJECTIVE BOX
Transplant Surgery - Multidisciplinary Rounds  --------------------------------------------------------------  R DDRT 2018- Admitted for CHELA,  hydronephrosis       Present:   Patient seen with multidisciplinary team including Transplant Surgeon: Dr. Marie, Dr. Heath. Nephrologist Dr. Ramón CRISTINA Pharmacist: Noa Rodriguez. NP's: Jameel Thomas, PGY 3 Dr. Farmer, Dr. Connolly, RN manager, and bedside RN  and examined with Dr. Marie. Disciplines not in attendance will be notified of the plan.     HPI: 70 y/o/M with a PMH of ESRD 2/2 bilateral nephrectomy from neoplasm, hypertension, and NIDDM s/p HCV + DDRT (2018) c/b DGF, ATN/mild rejection (2018 treated with steroids) and perinephric collection requiring drainage,previous admission in (2019) with E coli bacteremia 2/2 urosepsis treated w/ IV vanc/cefepime/zosyn.  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 readmitted in 2018 w/ severe CHELA 2/2 FK toxicity (level on admission was 39), +UA, was treated w/ cefepime. Also was readmitted in 2019 w/ uptrending Cr on outpatient labs (3.4), He was  treated with pulse steroid for ACR 1B. Pt now presenting as direct admit with uptredning Cr on outpatient labs (3.5). Pt also c/o frequent diarrhea for which he was advised to take Lomotil PRN. The patient denies fever, chills; chest pain, SOB, palpitation; dizziness, weakness; nausea, vomiting; constipation; abdominal pain; dysuria, hematuria; leg swelling; sick contacts; and recent travel.    Interval Events:    S/P IR placement of PCN with good UO  Restarted on home medications  Afebrile      Potential Discharge date: Pending daily evaluation    Education:  Medications    Plan of care:  See Below      MEDICATIONS  (STANDING):  allopurinol 100 milliGRAM(s) Oral daily  calcium carbonate    500 mG (Tums) Chewable 1 Tablet(s) Chew three times a day  dextrose 5%. 1000 milliLiter(s) (50 mL/Hr) IV Continuous <Continuous>  dextrose 50% Injectable 12.5 Gram(s) IV Push once  dextrose 50% Injectable 25 Gram(s) IV Push once  dextrose 50% Injectable 25 Gram(s) IV Push once  famotidine    Tablet 20 milliGRAM(s) Oral daily  gabapentin 300 milliGRAM(s) Oral daily  insulin lispro (HumaLOG) corrective regimen sliding scale   SubCutaneous three times a day before meals  insulin lispro (HumaLOG) corrective regimen sliding scale   SubCutaneous at bedtime  metoprolol succinate  milliGRAM(s) Oral daily  multivitamin 1 Tablet(s) Oral daily  mycophenolate mofetil 1000 milliGRAM(s) Oral two times a day  NIFEdipine XL 30 milliGRAM(s) Oral daily  predniSONE   Tablet 5 milliGRAM(s) Oral daily  tacrolimus ER Tablet (ENVARSUS XR) 6 milliGRAM(s) Oral daily  tamsulosin 0.4 milliGRAM(s) Oral at bedtime  trimethoprim   80 mG/sulfamethoxazole 400 mG 1 Tablet(s) Oral daily    MEDICATIONS  (PRN):  dextrose 40% Gel 15 Gram(s) Oral once PRN Blood Glucose LESS THAN 70 milliGRAM(s)/deciLiter  diphenoxylate/atropine 1 Tablet(s) Oral daily PRN Diarrhea  glucagon  Injectable 1 milliGRAM(s) IntraMuscular once PRN Glucose <70 milliGRAM(s)/deciLiter      PAST MEDICAL & SURGICAL HISTORY:  Medial meniscus tear: &#x27; 90&#x27;s  Right  Bowel obstruction: &#x27; 2017   surgically treated  Anal fissure: dx: &#x27; 2018   No surgery  Benign prostatic hypertrophy  Hepatitis C: In Donor Kidney: patient received treatment for Hep. C : post treatment: patient  tested Negative for Hep C  Kidney neoplasm: dx: &#x27;  : Left      s/p bilateral Nephrectomy &#x27;   ESRD (end stage renal disease): On Dialysis &#x27;  to 2018  Type 2 diabetes mellitus: dx: &#x27;88  HTN (hypertension)  Kidney transplant recipient: 2018  @ [facility] :  +  Hep C Donor  S/P right knee arthroscopy: &#x27; 90&#x27;s  H/O ileostomy: &#x27; 2017   for 3 months. 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      Vital Signs Last 24 Hrs  T(C): 36.7 (2020 09:00), Max: 36.8 (2020 22:00)  T(F): 98.1 (2020 09:00), Max: 98.3 (2020 05:00)  HR: 75 (2020 09:00) (62 - 88)  BP: 129/68 (2020 09:00) (109/54 - 150/72)  BP(mean): --  RR: 20 (2020 09:00) (16 - 20)  SpO2: 99% (2020 09:00) (97% - 100%)    I&O's Summary    2020 07:  -  2020 07:00  --------------------------------------------------------  IN: 320 mL / OUT: 1325 mL / NET: -1005 mL    2020 07:01  -  2020 11:35  --------------------------------------------------------  IN: 240 mL / OUT: 200 mL / NET: 40 mL                         10.2   3.60  )-----------( 122      ( 2020 06:49 )             33.7         138  |  107  |  24<H>  ----------------------------<  104<H>  4.8   |  18<L>  |  2.58<H>    Ca    9.7      2020 05:58  Phos  3.5       Mg     1.6           Tacrolimus (), Serum: 10.3 ng/mL ( @ 09:16)     Review of systems  Gen: No weight changes, fatigue, fevers/chills, weakness  Skin: No rashes  Head/Eyes/Ears/Mouth: No headache; Normal hearing; Normal vision w/o blurriness; No sinus pain/discomfort, sore throat  Respiratory: No dyspnea, cough, wheezing, hemoptysis  CV: No chest pain, PND, orthopnea  GI: No  abdominal pain at surgical site, No diarrhea, constipation, nausea, vomiting, melena, hematochezia  : No increased frequency, dysuria, hematuria, nocturia  MSK: No joint pain/swelling; no back pain; no edema  Neuro: No dizziness/lightheadedness, weakness, seizures, numbness, tingling  Heme: No easy bruising or bleeding  Endo: No heat/cold intolerance  Psych: No significant nervousness, anxiety, stress, depression  All other systems were reviewed and are negative, except as noted.      PHYSICAL EXAM:  Constitutional: Well developed / well nourished  Eyes: Anicteric, PERRLA  ENMT: nc/at  Neck: supple  Respiratory: CTA B/L  Cardiovascular: RRR  Gastrointestinal: Soft abdomen, non tender to touch  ND  Genitourinary: PCN patent  Extremities: no edema, scds in place  Vascular: Palpable dp pulses bilaterally  Neurological: A&O x3  Skin: no rashes or lesions   Musculoskeletal: Moving all extremities  Psychiatric: Responsive

## 2020-02-26 NOTE — PROGRESS NOTE ADULT - ASSESSMENT
68 y/o/M with a PMH of ESRD 2/2 bilateral nephrectomy from neoplasm, hypertension, and NIDDM s/p HCV + DDRT (7/2018) c/b DGF, ATN/mild rejection (8/2018 treated with steroids) and perinephric collection requiring drainage,previous admission in (2/2019) with E coli bacteremia 2/2 urosepsis treated w/ IV vanc/cefepime/zosyn.  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 readmitted in 05/2018 w/ severe CHELA 2/2 FK toxicity (level on admission was 39), +UA, was treated w/ cefepime. Also was readmitted in 8/2019 w/ uptrending Cr on outpatient labs (3.4), He was  treated with pulse steroid for ACR 1B. on 2/24 presented as direct admit with uptredning Cr on outpatient labs (3.5).          Problem/Plan -     [] CHELA, Hydronephrosis   - S/P IR PCN placement yesterday  - Plan for nephrostogram today  - NPO@MN for nephrouretrostomy in am      [] S/P DDRT 7/2018  - Immuno: Envarsus, MMF, Pred, bactrim  - Fk level daily   - Strict I/Os  - Regular diet.   - Continue flomax  - Continue home meds allopurinol, gabapentin, pepcid, calcium carbonate  - Colorectal consult with Dr. Porras for rectal fissures and hemorrhoids   - F/U blood and urine cultures    [] DM  - Continue ISS monitor BG    [] HTN  - BP controlled  - Continue Nifedipine 30 QD, Lopressor 100mg QD     [] Dispo: Plan for DC home on 2/27 post nephrouretrostomy placement

## 2020-02-26 NOTE — PROGRESS NOTE ADULT - SUBJECTIVE AND OBJECTIVE BOX
Interventional Radiology    S/P RLQ abdomen transplant nephrostomy tube placement, POD # 1.      Pt denies any nausea, vomiting, flank pain, or abdominal pain.    Vital Signs Last 24 Hrs  T(C): 36.8 (26 Feb 2020 13:00), Max: 36.8 (25 Feb 2020 22:00)  T(F): 98.3 (26 Feb 2020 13:00), Max: 98.3 (26 Feb 2020 05:00)  HR: 74 (26 Feb 2020 13:00) (62 - 88)  BP: 139/65 (26 Feb 2020 13:00) (109/54 - 150/72)  BP(mean): --  RR: 20 (26 Feb 2020 13:00) (16 - 20)  SpO2: 98% (26 Feb 2020 13:00) (97% - 100%)    General Appearance: NAD, seen in his room sitting in chair comfortably.    Procedure Site (RLQ abdomen): catheter intact, draining clear urine to leg bag. flushed without difficulty.     I&O's Detail    OUT:    R transplant PCN Drain: 675 mL (2/26 7AM)    LABS:                        10.2   3.60  )-----------( 122      ( 26 Feb 2020 06:49 )             33.7     02-26    138  |  107  |  24<H>  ----------------------------<  104<H>  4.8   |  18<L>  |  2.58<H>    Ca    9.7      26 Feb 2020 05:58  Phos  3.5     02-26  Mg     1.6     02-26      PT/INR - ( 24 Feb 2020 22:40 )   PT: 11.1 sec;   INR: 0.97 ratio    PTT - ( 24 Feb 2020 22:40 )  PTT:30.8 sec      A/P   69y Male with ESRD, H/O DDRT, now with elevated creatinine and transplant kidney hydronephrosis s/p PCN of transplant kidney on 2/25/20    Continue global medical management as per primary team.  Maintain dressing around the transplant nephrostomy tube.  Forward flush the catheter once daily as ordered.  Monitor and record daily outputs from the nephrostomy.    SARTHAK Gillespie  [facility] [id number]  Ext [phone]

## 2020-02-27 LAB
ANION GAP SERPL CALC-SCNC: 13 MMOL/L — SIGNIFICANT CHANGE UP (ref 5–17)
APTT BLD: 26.8 SEC — LOW (ref 27.5–36.3)
BLD GP AB SCN SERPL QL: NEGATIVE — SIGNIFICANT CHANGE UP
BUN SERPL-MCNC: 26 MG/DL — HIGH (ref 7–23)
CALCIUM SERPL-MCNC: 9.8 MG/DL — SIGNIFICANT CHANGE UP (ref 8.4–10.5)
CHLORIDE SERPL-SCNC: 105 MMOL/L — SIGNIFICANT CHANGE UP (ref 96–108)
CO2 SERPL-SCNC: 21 MMOL/L — LOW (ref 22–31)
CREAT SERPL-MCNC: 2.45 MG/DL — HIGH (ref 0.5–1.3)
CULTURE RESULTS: SIGNIFICANT CHANGE UP
GLUCOSE BLDC GLUCOMTR-MCNC: 124 MG/DL — HIGH (ref 70–99)
GLUCOSE BLDC GLUCOMTR-MCNC: 133 MG/DL — HIGH (ref 70–99)
GLUCOSE BLDC GLUCOMTR-MCNC: 140 MG/DL — HIGH (ref 70–99)
GLUCOSE BLDC GLUCOMTR-MCNC: 153 MG/DL — HIGH (ref 70–99)
GLUCOSE SERPL-MCNC: 126 MG/DL — HIGH (ref 70–99)
HCT VFR BLD CALC: 34.4 % — LOW (ref 39–50)
HGB BLD-MCNC: 10.6 G/DL — LOW (ref 13–17)
INR BLD: 0.9 RATIO — SIGNIFICANT CHANGE UP (ref 0.88–1.16)
MAGNESIUM SERPL-MCNC: 1.7 MG/DL — SIGNIFICANT CHANGE UP (ref 1.6–2.6)
MCHC RBC-ENTMCNC: 29.8 PG — SIGNIFICANT CHANGE UP (ref 27–34)
MCHC RBC-ENTMCNC: 30.8 GM/DL — LOW (ref 32–36)
MCV RBC AUTO: 96.6 FL — SIGNIFICANT CHANGE UP (ref 80–100)
NRBC # BLD: 0 /100 WBCS — SIGNIFICANT CHANGE UP (ref 0–0)
PHOSPHATE SERPL-MCNC: 3.6 MG/DL — SIGNIFICANT CHANGE UP (ref 2.5–4.5)
PLATELET # BLD AUTO: 134 K/UL — LOW (ref 150–400)
POTASSIUM SERPL-MCNC: 4.1 MMOL/L — SIGNIFICANT CHANGE UP (ref 3.5–5.3)
POTASSIUM SERPL-SCNC: 4.1 MMOL/L — SIGNIFICANT CHANGE UP (ref 3.5–5.3)
PROTHROM AB SERPL-ACNC: 10.3 SEC — SIGNIFICANT CHANGE UP (ref 10–12.9)
RBC # BLD: 3.56 M/UL — LOW (ref 4.2–5.8)
RBC # FLD: 14.5 % — SIGNIFICANT CHANGE UP (ref 10.3–14.5)
RH IG SCN BLD-IMP: POSITIVE — SIGNIFICANT CHANGE UP
SODIUM SERPL-SCNC: 139 MMOL/L — SIGNIFICANT CHANGE UP (ref 135–145)
SPECIMEN SOURCE: SIGNIFICANT CHANGE UP
TACROLIMUS SERPL-MCNC: 8.4 NG/ML — SIGNIFICANT CHANGE UP
WBC # BLD: 3.52 K/UL — LOW (ref 3.8–10.5)
WBC # FLD AUTO: 3.52 K/UL — LOW (ref 3.8–10.5)

## 2020-02-27 PROCEDURE — 99233 SBSQ HOSP IP/OBS HIGH 50: CPT | Mod: GC,24

## 2020-02-27 PROCEDURE — 50434 CONVERT NEPHROSTOMY CATHETER: CPT | Mod: RT

## 2020-02-27 RX ORDER — FUROSEMIDE 40 MG
80 TABLET ORAL
Refills: 0 | Status: DISCONTINUED | OUTPATIENT
Start: 2020-02-27 | End: 2020-02-27

## 2020-02-27 RX ORDER — MAGNESIUM SULFATE 500 MG/ML
2 VIAL (ML) INJECTION ONCE
Refills: 0 | Status: COMPLETED | OUTPATIENT
Start: 2020-02-27 | End: 2020-02-27

## 2020-02-27 RX ORDER — HYDROCORTISONE 1 %
1 OINTMENT (GRAM) TOPICAL DAILY
Refills: 0 | Status: DISCONTINUED | OUTPATIENT
Start: 2020-02-27 | End: 2020-02-28

## 2020-02-27 RX ORDER — NITROGLYCERIN 6.5 MG
1 CAPSULE, EXTENDED RELEASE ORAL DAILY
Refills: 0 | Status: DISCONTINUED | OUTPATIENT
Start: 2020-02-27 | End: 2020-02-28

## 2020-02-27 RX ADMIN — Medication 1 TABLET(S): at 05:36

## 2020-02-27 RX ADMIN — Medication 2: at 09:24

## 2020-02-27 RX ADMIN — Medication 1 SUPPOSITORY(S): at 20:51

## 2020-02-27 RX ADMIN — TAMSULOSIN HYDROCHLORIDE 0.4 MILLIGRAM(S): 0.4 CAPSULE ORAL at 20:53

## 2020-02-27 RX ADMIN — FAMOTIDINE 20 MILLIGRAM(S): 10 INJECTION INTRAVENOUS at 11:39

## 2020-02-27 RX ADMIN — MYCOPHENOLATE MOFETIL 1000 MILLIGRAM(S): 250 CAPSULE ORAL at 17:26

## 2020-02-27 RX ADMIN — Medication 1 APPLICATION(S): at 20:51

## 2020-02-27 RX ADMIN — GABAPENTIN 300 MILLIGRAM(S): 400 CAPSULE ORAL at 11:38

## 2020-02-27 RX ADMIN — Medication 15 MILLILITER(S): at 14:33

## 2020-02-27 RX ADMIN — Medication 1 TABLET(S): at 14:38

## 2020-02-27 RX ADMIN — Medication 100 MILLIGRAM(S): at 11:39

## 2020-02-27 RX ADMIN — TACROLIMUS 5 MILLIGRAM(S): 5 CAPSULE ORAL at 08:02

## 2020-02-27 RX ADMIN — Medication 15 MILLILITER(S): at 05:36

## 2020-02-27 RX ADMIN — Medication 15 MILLILITER(S): at 20:53

## 2020-02-27 RX ADMIN — Medication 1 TABLET(S): at 22:18

## 2020-02-27 RX ADMIN — Medication 5 MILLIGRAM(S): at 05:36

## 2020-02-27 RX ADMIN — Medication 50 GRAM(S): at 08:02

## 2020-02-27 RX ADMIN — Medication 1 TABLET(S): at 14:33

## 2020-02-27 RX ADMIN — MYCOPHENOLATE MOFETIL 1000 MILLIGRAM(S): 250 CAPSULE ORAL at 05:36

## 2020-02-27 RX ADMIN — Medication 30 MILLIGRAM(S): at 05:36

## 2020-02-27 RX ADMIN — Medication 100 MILLIGRAM(S): at 05:36

## 2020-02-27 RX ADMIN — Medication 1 TABLET(S): at 11:39

## 2020-02-27 RX ADMIN — Medication 1 TABLET(S): at 20:53

## 2020-02-27 NOTE — PROGRESS NOTE ADULT - SUBJECTIVE AND OBJECTIVE BOX
Transplant Surgery - Multidisciplinary Rounds  --------------------------------------------------------------  R DDRT 2018- Admitted for CHELA,  hydronephrosis       Present:   Patient seen and examined with multidisciplinary team including Transplant Surgeon: Dr. Marie, Dr. Heath. Nephrologist Dr. JONNIE Bhakta. Pharmacist: Michael.  Inpatient: Jameel/Loyd/Cathleen/Darian, unit RN. Disciplines not in attendance will be notified of the plan.     HPI: 68 y/o/M with a PMH of ESRD 2/2 bilateral nephrectomy from neoplasm, hypertension, and NIDDM s/p HCV + DDRT (2018) c/b DGF, ATN/mild rejection (2018 treated with steroids) and perinephric collection requiring drainage,previous admission in (2019) with E coli bacteremia 2/2 urosepsis treated w/ IV vanc/cefepime/zosyn.  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 readmitted in 2018 w/ severe CHELA 2/2 FK toxicity (level on admission was 39), +UA, was treated w/ cefepime. Also was readmitted in 2019 w/ uptrending Cr on outpatient labs (3.4), He was  treated with pulse steroid for ACR 1B. Pt now presenting as direct admit with uptredning Cr on outpatient labs (3.5). Pt also c/o frequent diarrhea for which he was advised to take Lomotil PRN. The patient denies fever, chills; chest pain, SOB, palpitation; dizziness, weakness; nausea, vomiting; constipation; abdominal pain; dysuria, hematuria; leg swelling; sick contacts; and recent travel.    Interval Events:    S/P IR placement of PCN with good UO, Cr downtrending to 2.45 from 2.7  Restarted on home medications  Afebrile, VS stable  chronic diarrhea controlled with antidiarrheals  Awaiting IR PCN-U today    Potential Discharge date: possible today    Education:  Medications    Plan of care:  See Below      MEDICATIONS  (STANDING):  allopurinol 100 milliGRAM(s) Oral daily  calcium carbonate    500 mG (Tums) Chewable 1 Tablet(s) Chew three times a day  citric acid/sodium citrate Solution 15 milliLiter(s) Oral three times a day  dextrose 5%. 1000 milliLiter(s) (50 mL/Hr) IV Continuous <Continuous>  dextrose 50% Injectable 12.5 Gram(s) IV Push once  dextrose 50% Injectable 25 Gram(s) IV Push once  dextrose 50% Injectable 25 Gram(s) IV Push once  famotidine    Tablet 20 milliGRAM(s) Oral daily  gabapentin 300 milliGRAM(s) Oral daily  insulin lispro (HumaLOG) corrective regimen sliding scale   SubCutaneous three times a day before meals  insulin lispro (HumaLOG) corrective regimen sliding scale   SubCutaneous at bedtime  metoprolol succinate  milliGRAM(s) Oral daily  multivitamin 1 Tablet(s) Oral daily  mycophenolate mofetil 1000 milliGRAM(s) Oral two times a day  NIFEdipine XL 30 milliGRAM(s) Oral daily  predniSONE   Tablet 5 milliGRAM(s) Oral daily  tacrolimus ER Tablet (ENVARSUS XR) 5 milliGRAM(s) Oral <User Schedule>  tamsulosin 0.4 milliGRAM(s) Oral at bedtime  trimethoprim   80 mG/sulfamethoxazole 400 mG 1 Tablet(s) Oral daily    MEDICATIONS  (PRN):  dextrose 40% Gel 15 Gram(s) Oral once PRN Blood Glucose LESS THAN 70 milliGRAM(s)/deciLiter  diphenoxylate/atropine 1 Tablet(s) Oral daily PRN Diarrhea  glucagon  Injectable 1 milliGRAM(s) IntraMuscular once PRN Glucose <70 milliGRAM(s)/deciLiter      PAST MEDICAL & SURGICAL HISTORY:  Medial meniscus tear: &#x27; 90&#x27;s  Right  Bowel obstruction: &#x27; 2017   surgically treated  Anal fissure: dx: &#x27; 2018   No surgery  Benign prostatic hypertrophy  Hepatitis C: In Donor Kidney: patient received treatment for Hep. 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      Vital Signs Last 24 Hrs  T(C): 36.6 (2020 09:06), Max: 36.8 (2020 13:00)  T(F): 97.9 (2020 09:06), Max: 98.3 (2020 13:00)  HR: 65 (2020 09:06) (65 - 81)  BP: 157/71 (2020 09:06) (131/69 - 157/71)  BP(mean): --  RR: 18 (2020 09:06) (18 - 20)  SpO2: 99% (2020 09:06) (96% - 99%)    I&O's Summary    2020 07:01  -  2020 07:00  --------------------------------------------------------  IN: 780 mL / OUT: 1825 mL / NET: -1045 mL    2020 07:01  -  2020 10:35  --------------------------------------------------------  IN: 60 mL / OUT: 300 mL / NET: -240 mL                              10.6   3.52  )-----------( 134      ( 2020 05:46 )             34.4     02-    139  |  105  |  26<H>  ----------------------------<  126<H>  4.1   |  21<L>  |  2.45<H>    Ca    9.8      2020 05:46  Phos  3.6       Mg     1.7           Tacrolimus (), Serum: 8.4 ng/mL ( @ 07:39)        Culture - Blood (collected 20 @ 09:04)  Source: .Blood Blood  Preliminary Report (20 @ 10:01):    No growth to date.    Culture - Blood (collected 20 @ 09:04)  Source: .Blood Blood  Preliminary Report (20 @ 10:01):    No growth to date.    Culture - Urine (collected 20 @ 23:18)  Source: .Urine Nephrostomy - Right  Preliminary Report (20 @ 20:29):    No growth    Culture - Urine (collected 20 @ 18:54)  Source: .Urine Clean Catch (Midstream)  Final Report (20 @ 18:58):    <10,000 CFU/mL Normal Urogenital Rae                          Review of systems  Gen: No weight changes, fatigue, fevers/chills, weakness  Skin: No rashes  Head/Eyes/Ears/Mouth: No headache; Normal hearing; Normal vision w/o blurriness; No sinus pain/discomfort, sore throat  Respiratory: No dyspnea, cough, wheezing, hemoptysis  CV: No chest pain, PND, orthopnea  GI: No  abdominal pain at surgical site, No diarrhea, constipation, nausea, vomiting, melena, hematochezia  : No increased frequency, dysuria, hematuria, nocturia  MSK: No joint pain/swelling; no back pain; no edema  Neuro: No dizziness/lightheadedness, weakness, seizures, numbness, tingling  Heme: No easy bruising or bleeding  Endo: No heat/cold intolerance  Psych: No significant nervousness, anxiety, stress, depression  All other systems were reviewed and are negative, except as noted.      PHYSICAL EXAM:  Constitutional: Well developed / well nourished  Eyes: Anicteric, PERRLA  ENMT: nc/at  Neck: supple  Respiratory: CTA B/L  Cardiovascular: RRR  Gastrointestinal: Soft abdomen, non tender to touch  ND  Genitourinary: PCN patent  Extremities: no edema, scds in place  Vascular: Palpable dp pulses bilaterally  Neurological: A&O x3  Skin: no rashes or lesions   Musculoskeletal: Moving all extremities  Psychiatric: Responsive

## 2020-02-27 NOTE — PROGRESS NOTE ADULT - SUBJECTIVE AND OBJECTIVE BOX
Interventional Radiology Brief- Operative Note    Procedure: Transplant NT to NU stent conversin    Operators: Ion Beebe    Anesthesia (type): MAC    Contrast: 20 cc    EBL: minimal    Findings/Follow up Plan of Care: UPJ/prox ureter stricture, 8F 20cm NUS placed    Specimens Removed: none    Implants: NUS    Complications: none    Condition/Disposition: stable,,to RR    Please call Interventional Radiology x [phone] with any questions, concerns, or issues.

## 2020-02-27 NOTE — CONSULT NOTE ADULT - ASSESSMENT
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 readmitted in 05/2018 w/ severe CHELA 2/2 FK toxicity (level on admission was 39), +UA, was treated w/ cefepime. Also was readmitted in 8/2019 w/ uptrending Cr on outpatient labs (3.4), He was  treated with pulse steroid for ACR 1B. on 2/24 presented as direct admit with uptredning Cr on outpatient labs (3.5).     Patient now s/p PCN of transplanted kidney by IR. Colorectal surgery was consulted given intermittent perianal pain with defecation. Patient with known internal hemorrhoids and a history of anal fissure.  CECILIA unremarkable at present time.     - no emergent colorectal surgical intervention at present time  - would suggest continue outpatient regiment with nitroglycerin ointment, sitz bath and stool softeners  - patient can follow up as an outpatient with colorectal surgery     Plan discussed with colorectal surgery fellow PATRICIA Craft MD.     Please contact [facility] Surgery (P: [phone]) with any questions.    COBY Juárez MD, PGY-II  Surgery, Red Team  Pager: [phone]  [facility] 70 y/o/M with a PMH of ESRD 2/2 bilateral nephrectomy from neoplasm, hypertension, and NIDDM s/p HCV + DDRT (7/2018) c/b DGF, ATN/mild rejection (8/2018 treated with steroids) and perinephric collection requiring drainage,previous admission in (2/2019) with E coli bacteremia 2/2 urosepsis treated w/ IV vanc/cefepime/zosyn.  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 readmitted in 05/2018 w/ severe CHELA 2/2 FK toxicity (level on admission was 39), +UA, was treated w/ cefepime. Also was readmitted in 8/2019 w/ uptrending Cr on outpatient labs (3.4), He was  treated with pulse steroid for ACR 1B. on 2/24 presented as direct admit with uptredning Cr on outpatient labs (3.5).     Patient now s/p PCN of transplanted kidney by IR. Colorectal surgery was consulted given intermittent perianal pain with defecation. Patient with known internal hemorrhoids and a history of anal fissure.  CECILIA unremarkable at present time.     - no emergent colorectal surgical intervention at present time  - would suggest continue nitroglycerin ointment, sitz bath and stool softeners, as well as anusol suppositories  -please make sure patient has written prescription for nitroglycerin ointment prior to discharge   - patient can follow up as an outpatient with colorectal surgery     Plan discussed with colorectal surgery fellow PATRICIA Craft MD.   Patient seen and examined with Dr. Morrison this morning    Please contact [facility] (P: [phone]) with any questions.    COBY Juárez MD, PGY-II  Surgery, Red Team  Pager: [phone]  [facility]

## 2020-02-27 NOTE — PROGRESS NOTE ADULT - SUBJECTIVE AND OBJECTIVE BOX
Interventional Radiology Pre-Procedure Note    This is a 69y M s/p renal transplant PCN for hydronephrosis and elevated Cr on 20 now presenting for a nephroureteral conversion. Cr now downtrending (2.58->2.45).      Procedure: Renal transplant PCN to a nephroureteral conversion.    Diagnosis/Indication: Patient is a 69y old  Male who presents with elevated creatinine s/p DDRT (2020 10:23)      PAST MEDICAL & SURGICAL HISTORY:  Medial meniscus tear: &#x27; 90&#x27;s  Right  Bowel obstruction: &#x27; 2017   surgically treated  Anal fissure: dx: &#x27; 2018   No surgery  Benign prostatic hypertrophy  Hepatitis C: In Donor Kidney: patient received treatment for Hep. C : post treatment: patient  tested Negative for Hep C  Kidney neoplasm: dx: &#x27;  : Left      s/p bilateral Nephrectomy &#x27; 2015  ESRD (end stage renal disease): On Dialysis &#x27;  to 2018  Type 2 diabetes mellitus: dx: &#x27;88  HTN (hypertension)  Kidney transplant recipient: 2018  @ [facility] :  +  Hep C Donor  S/P right knee arthroscopy: &#x27; 90&#x27;s  H/O ileostomy: &#x27; 2017   for 3 months. 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     Male    Allergies: No Known Allergies      LABS:  CBC Full  -  ( 2020 05:46 )  WBC Count : 3.52 K/uL  RBC Count : 3.56 M/uL  Hemoglobin : 10.6 g/dL  Hematocrit : 34.4 %  Platelet Count - Automated : 134 K/uL  Mean Cell Volume : 96.6 fl  Mean Cell Hemoglobin : 29.8 pg  Mean Cell Hemoglobin Concentration : 30.8 gm/dL        139  |  105  |  26<H>  ----------------------------<  126<H>  4.1   |  21<L>  |  2.45<H>    Ca    9.8      2020 05:46  Phos  3.6       Mg     1.7           PT/INR - ( 2020 05:46 )   PT: 10.3 sec;   INR: 0.90 ratio         PTT - ( 2020 05:46 )  PTT:26.8 sec    Plan:  -Renal transplant PCN to a nephroureteral conversion.  -Procedure/ risks/ benefits were explained, informed consent obtained from patient, verbalizes understanding.

## 2020-02-27 NOTE — PROGRESS NOTE ADULT - ASSESSMENT
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 readmitted in 05/2018 w/ severe CHELA 2/2 FK toxicity (level on admission was 39), +UA, was treated w/ cefepime. Also was readmitted in 8/2019 w/ uptrending Cr on outpatient labs (3.4), He was  treated with pulse steroid for ACR 1B. on 2/24 presented as direct admit with uptredning Cr on outpatient labs (3.5) with new hydro on u/s.      [] CHELA, Hydronephrosis   - S/P IR PCN placement 2/25, plan for internalization with PCN-U today with IR  - Cr trending down      [] S/P DDRT 7/2018  - Immuno: Envarsus per level, MMF, Pred, bactrim  - Strict I/Os  - Regular diet  - Continue home meds  - Colorectal consult with Dr. Porras for rectal fissures and hemorrhoids, f/u as outpatient. continue current tx  - F/U blood and urine cultures: neg so far    [] DM  - Continue ISS monitor BG    [] HTN  - BP controlled  - Continue Nifedipine 30 QD, Lopressor 100mg QD     [] Dispo:   plan for d/c post PCN-U

## 2020-02-27 NOTE — CONSULT NOTE ADULT - SUBJECTIVE AND OBJECTIVE BOX
COLORECTAL SURGERY CONSULT NOTE  JUAN CARLOS COPELAND  |  [id number]  |  -20 @ 00:04    CC: Patient is a 69y old  Male who presents with a chief complaint of Elevated creatinine s/p DDRT (2020 15:37)    HPI:  69yM w/ PMH sig for HTN, DM, ESRD 2/2 bilateral nephrectomy from neoplasm, s/p HCV + DDRT () c/b DGF, ATN/mild rejection (2017 treated with steroids) and perinephric collection requiring drainage, 19 ureteral stent removal; previous admission in (2019) with E coli bacteremia 2/2 urosepsis treated w/ IV vanc/cefepime/zosyn. 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 Recently admitted in 2019 w/ severe CHELA 2/2 FK toxicity (level on admission was 39). Pt now presenting from clinic appt w/ Dr. Bhakta after uptredning Cr on outpatient labs (3.5). Pt also c/o frequent diarrhea for which he was advised to take Lomotil PRN. The patient denies fever, chills; chest pain, SOB, palpitation; dizziness, weakness; nausea, vomiting; constipation; abdominal pain; dysuria, hematuria; leg swelling; sick contacts; and recent travel. (2020 21:53)    INTERVAL EVENTS:  Patient has perirectal abd pain a/w defecation prompting colorectal surgery consultation.  Patient was seen and examined at bedside. Patient states that he had "dead bowel" requiring a bowel resection and ileostomy in  that was subsequently reversed () at [facility]. Patient does not recall name of surgeon. Since the ileostomy reversal, patient has had perirectal pain with defecation. Per patient, pain has significantly improved in past year.  Patient has seen Dr. Ma and Dr. Del Cid in past year and month respectively. Per patient, he has been told that he has internal hemorrhods in the past.  He self treats with nitroglycerin cream, a topical ointment, stool softeners and sitz baths.     Patient currently denies any CP / SOB / nausea / vomiting / diarrhea. +flatus. +BM. Tolerating diet.    REVIEW OF SYSTEMS:  As above.    PAST MEDICAL & SURGICAL HISTORY:  Medial meniscus tear: &#x27; 90&#x27;s  Right  Bowel obstruction: &#x27; 2017   surgically treated  Anal fissure: dx: &#x27; 2018   No surgery  Benign prostatic hypertrophy  Hepatitis C: In Donor Kidney: patient received treatment for Hep. C : post treatment: patient  tested Negative for Hep C  Kidney neoplasm: dx: &#x27;  : Left      s/p bilateral Nephrectomy &#x27;   ESRD (end stage renal disease): On Dialysis &#x27;  to 2018  Type 2 diabetes mellitus: dx: &#x27;88  HTN (hypertension)  Kidney transplant recipient: 2018  @ [facility] :  +  Hep C Donor  S/P right knee arthroscopy: &#x27; 90&#x27;s  H/O ileostomy: &#x27; 2017   for 3 months. 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 1000 milliLiter(s) (50 mL/Hr) IV Continuous <Continuous>  dextrose 50% Injectable 12.5 Gram(s) IV Push once  dextrose 50% Injectable 25 Gram(s) IV Push once  dextrose 50% Injectable 25 Gram(s) IV Push once  famotidine    Tablet 20 milliGRAM(s) Oral daily  gabapentin 300 milliGRAM(s) Oral daily  insulin lispro (HumaLOG) corrective regimen sliding scale   SubCutaneous three times a day before meals  insulin lispro (HumaLOG) corrective regimen sliding scale   SubCutaneous at bedtime  metoprolol succinate  milliGRAM(s) Oral daily  multivitamin 1 Tablet(s) Oral daily  mycophenolate mofetil 1000 milliGRAM(s) Oral two times a day  NIFEdipine XL 30 milliGRAM(s) Oral daily  predniSONE   Tablet 5 milliGRAM(s) Oral daily  tamsulosin 0.4 milliGRAM(s) Oral at bedtime  trimethoprim   80 mG/sulfamethoxazole 400 mG 1 Tablet(s) Oral daily    MEDICATIONS  (PRN):  dextrose 40% Gel 15 Gram(s) Oral once PRN Blood Glucose LESS THAN 70 milliGRAM(s)/deciLiter  diphenoxylate/atropine 1 Tablet(s) Oral daily PRN Diarrhea  glucagon  Injectable 1 milliGRAM(s) IntraMuscular once PRN Glucose <70 milliGRAM(s)/deciLiter    ALLERGIES:  No Known Allergies or Intolerances    SOCIAL HISTORY:  Denies tobacco, EtOH, or recreational drug use. (2020 21:53)    FAMILY HISTORY:  - unless noted, no significant family hx with Mother, Father, Siblings    Objective:   Vital Signs Last 24 Hrs  T(C): 36.8 (2020 21:00), Max: 36.8 (2020 05:00)  T(F): 98.2 (2020 21:00), Max: 98.3 (2020 05:00)  HR: 72 (2020 21:00) (64 - 88)  BP: 131/69 (2020 21:00) (129/68 - 150/72)  RR: 18 (2020 21:00) (18 - 20)  SpO2: 99% (2020 21:00) (96% - 100%)    CAPILLARY BLOOD GLUCOSE  POCT Blood Glucose.: 162 mg/dL (2020 21:05)    Physical Exam:  General: Well developed, well nourished, alert and cooperative, and appears to be in no acute distress.  HEENT: normocephalic, vision is grossly intact  Neck: Neck supple, non-tender without lymphadenopathy, masses or thyromegaly  Chest: respirations grossly unlabored.   Abdomen: soft, non-distended, non-tender, no guarding or rebound  Rectal: No obvious anal fissure or hemorrhoid. CECILIA unremarkable and tolerated well.     LABS:                        10.2   3.60  )-----------( 122      ( 2020 06:49 )             33.7     02-    138  |  107  |  24<H>  ----------------------------<  104<H>  4.8   |  18<L>  |  2.58<H>    Ca    9.7      2020 05:58  Phos  3.5       Mg     1.6         Urinalysis Basic - ( 2020 01:45 )    Color: Light Yellow / Appearance: Clear / S.018 / pH: x  Gluc: x / Ketone: Negative  / Bili: Negative / Urobili: Negative   Blood: x / Protein: 30 mg/dL / Nitrite: Negative   Leuk Esterase: Small / RBC: 51 /hpf / WBC 4 /HPF   Sq Epi: x / Non Sq Epi: 0 /hpf / Bacteria: Negative    RADIOLOGY & ADDITIONAL STUDIES:    Outpatient colonoscopy from _____ demonstrated _______. COLORECTAL SURGERY CONSULT NOTE  JUAN CARLOS COPELAND  |  [id number]  |  -20 @ 00:04    CC: Patient is a 69y old  Male who presents with a chief complaint of Elevated creatinine s/p DDRT (2020 15:37)    HPI:  69yM w/ PMH sig for HTN, DM, ESRD 2/2 bilateral nephrectomy from neoplasm, s/p HCV + DDRT () c/b DGF, ATN/mild rejection (2017 treated with steroids) and perinephric collection requiring drainage, 19 ureteral stent removal; previous admission in (2019) with E coli bacteremia 2/2 urosepsis treated w/ IV vanc/cefepime/zosyn. 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 Recently admitted in 2019 w/ severe CHELA 2/2 FK toxicity (level on admission was 39). Pt now presenting from clinic appt w/ Dr. Bhakta after uptredning Cr on outpatient labs (3.5). Pt also c/o frequent diarrhea for which he was advised to take Lomotil PRN. The patient denies fever, chills; chest pain, SOB, palpitation; dizziness, weakness; nausea, vomiting; constipation; abdominal pain; dysuria, hematuria; leg swelling; sick contacts; and recent travel. (2020 21:53)    INTERVAL EVENTS:  Patient has perirectal abd pain a/w defecation prompting colorectal surgery consultation.  Patient was seen and examined at bedside. Patient states that he had "dead bowel" requiring a bowel resection and ileostomy in  that was subsequently reversed () at [facility]. Patient does not recall name of surgeon. Since the ileostomy reversal, patient has had perirectal pain with defecation. Per patient, pain has significantly improved in past year.  Patient has seen Dr. Ma and Dr. Del Cid in past year and month respectively. Per patient, he has been told that he has internal hemorrhods in the past.  He self treats with nitroglycerin cream, a topical ointment, stool softeners and sitz baths.     Patient currently denies any CP / SOB / nausea / vomiting / diarrhea. +flatus. +BM. Tolerating diet.    REVIEW OF SYSTEMS:  As above.    PAST MEDICAL & SURGICAL HISTORY:  Medial meniscus tear: &#x27; 90&#x27;s  Right  Bowel obstruction: &#x27; 2017   surgically treated  Anal fissure: dx: &#x27; 2018   No surgery  Benign prostatic hypertrophy  Hepatitis C: In Donor Kidney: patient received treatment for Hep. C : post treatment: patient  tested Negative for Hep C  Kidney neoplasm: dx: &#x27;  : Left      s/p bilateral Nephrectomy &#x27;   ESRD (end stage renal disease): On Dialysis &#x27;  to 2018  Type 2 diabetes mellitus: dx: &#x27;88  HTN (hypertension)  Kidney transplant recipient: 2018  @ [facility] :  +  Hep C Donor  S/P right knee arthroscopy: &#x27; 90&#x27;s  H/O ileostomy: &#x27; 2017   for 3 months. 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 1000 milliLiter(s) (50 mL/Hr) IV Continuous <Continuous>  dextrose 50% Injectable 12.5 Gram(s) IV Push once  dextrose 50% Injectable 25 Gram(s) IV Push once  dextrose 50% Injectable 25 Gram(s) IV Push once  famotidine    Tablet 20 milliGRAM(s) Oral daily  gabapentin 300 milliGRAM(s) Oral daily  insulin lispro (HumaLOG) corrective regimen sliding scale   SubCutaneous three times a day before meals  insulin lispro (HumaLOG) corrective regimen sliding scale   SubCutaneous at bedtime  metoprolol succinate  milliGRAM(s) Oral daily  multivitamin 1 Tablet(s) Oral daily  mycophenolate mofetil 1000 milliGRAM(s) Oral two times a day  NIFEdipine XL 30 milliGRAM(s) Oral daily  predniSONE   Tablet 5 milliGRAM(s) Oral daily  tamsulosin 0.4 milliGRAM(s) Oral at bedtime  trimethoprim   80 mG/sulfamethoxazole 400 mG 1 Tablet(s) Oral daily    MEDICATIONS  (PRN):  dextrose 40% Gel 15 Gram(s) Oral once PRN Blood Glucose LESS THAN 70 milliGRAM(s)/deciLiter  diphenoxylate/atropine 1 Tablet(s) Oral daily PRN Diarrhea  glucagon  Injectable 1 milliGRAM(s) IntraMuscular once PRN Glucose <70 milliGRAM(s)/deciLiter    ALLERGIES:  No Known Allergies or Intolerances    SOCIAL HISTORY:  Denies tobacco, EtOH, or recreational drug use. (2020 21:53)    FAMILY HISTORY:  - unless noted, no significant family hx with Mother, Father, Siblings    Objective:   Vital Signs Last 24 Hrs  T(C): 36.8 (2020 21:00), Max: 36.8 (2020 05:00)  T(F): 98.2 (2020 21:00), Max: 98.3 (2020 05:00)  HR: 72 (2020 21:00) (64 - 88)  BP: 131/69 (2020 21:00) (129/68 - 150/72)  RR: 18 (2020 21:00) (18 - 20)  SpO2: 99% (2020 21:00) (96% - 100%)    CAPILLARY BLOOD GLUCOSE  POCT Blood Glucose.: 162 mg/dL (2020 21:05)    Physical Exam:  General: Well developed, well nourished, alert and cooperative, and appears to be in no acute distress.  HEENT: normocephalic, vision is grossly intact  Neck: Neck supple, non-tender without lymphadenopathy, masses or thyromegaly  Chest: respirations grossly unlabored.   Abdomen: soft, non-distended, non-tender, no guarding or rebound  Rectal: No obvious anal fissure or hemorrhoid. CECILIA unremarkable and tolerated well.     LABS:                        10.2   3.60  )-----------( 122      ( 2020 06:49 )             33.7     02-    138  |  107  |  24<H>  ----------------------------<  104<H>  4.8   |  18<L>  |  2.58<H>    Ca    9.7      2020 05:58  Phos  3.5       Mg     1.6         Urinalysis Basic - ( 2020 01:45 )    Color: Light Yellow / Appearance: Clear / S.018 / pH: x  Gluc: x / Ketone: Negative  / Bili: Negative / Urobili: Negative   Blood: x / Protein: 30 mg/dL / Nitrite: Negative   Leuk Esterase: Small / RBC: 51 /hpf / WBC 4 /HPF   Sq Epi: x / Non Sq Epi: 0 /hpf / Bacteria: Negative    RADIOLOGY & ADDITIONAL STUDIES:    Outpatient colonoscopy from 10/19/18 demonstrated normal mucosa, diverticulosis of the transverse/descending/sigmoid colon, internal hemorrhoids.

## 2020-02-28 ENCOUNTER — TRANSCRIPTION ENCOUNTER (OUTPATIENT)
Age: 70
End: 2020-02-28

## 2020-02-28 VITALS
OXYGEN SATURATION: 94 % | RESPIRATION RATE: 18 BRPM | SYSTOLIC BLOOD PRESSURE: 109 MMHG | TEMPERATURE: 98 F | HEART RATE: 73 BPM | DIASTOLIC BLOOD PRESSURE: 67 MMHG

## 2020-02-28 LAB
ANION GAP SERPL CALC-SCNC: 13 MMOL/L — SIGNIFICANT CHANGE UP (ref 5–17)
BUN SERPL-MCNC: 27 MG/DL — HIGH (ref 7–23)
CALCIUM SERPL-MCNC: 9.5 MG/DL — SIGNIFICANT CHANGE UP (ref 8.4–10.5)
CHLORIDE SERPL-SCNC: 105 MMOL/L — SIGNIFICANT CHANGE UP (ref 96–108)
CO2 SERPL-SCNC: 19 MMOL/L — LOW (ref 22–31)
CREAT SERPL-MCNC: 2.62 MG/DL — HIGH (ref 0.5–1.3)
GLUCOSE BLDC GLUCOMTR-MCNC: 139 MG/DL — HIGH (ref 70–99)
GLUCOSE BLDC GLUCOMTR-MCNC: 147 MG/DL — HIGH (ref 70–99)
GLUCOSE SERPL-MCNC: 115 MG/DL — HIGH (ref 70–99)
HCT VFR BLD CALC: 30.4 % — LOW (ref 39–50)
HGB BLD-MCNC: 9.6 G/DL — LOW (ref 13–17)
MAGNESIUM SERPL-MCNC: 1.9 MG/DL — SIGNIFICANT CHANGE UP (ref 1.6–2.6)
MCHC RBC-ENTMCNC: 30.6 PG — SIGNIFICANT CHANGE UP (ref 27–34)
MCHC RBC-ENTMCNC: 31.6 GM/DL — LOW (ref 32–36)
MCV RBC AUTO: 96.8 FL — SIGNIFICANT CHANGE UP (ref 80–100)
NRBC # BLD: 0 /100 WBCS — SIGNIFICANT CHANGE UP (ref 0–0)
PHOSPHATE SERPL-MCNC: 3.4 MG/DL — SIGNIFICANT CHANGE UP (ref 2.5–4.5)
PLATELET # BLD AUTO: 118 K/UL — LOW (ref 150–400)
POTASSIUM SERPL-MCNC: 4.2 MMOL/L — SIGNIFICANT CHANGE UP (ref 3.5–5.3)
POTASSIUM SERPL-SCNC: 4.2 MMOL/L — SIGNIFICANT CHANGE UP (ref 3.5–5.3)
RBC # BLD: 3.14 M/UL — LOW (ref 4.2–5.8)
RBC # FLD: 14.7 % — HIGH (ref 10.3–14.5)
SODIUM SERPL-SCNC: 137 MMOL/L — SIGNIFICANT CHANGE UP (ref 135–145)
TACROLIMUS SERPL-MCNC: 7.3 NG/ML — SIGNIFICANT CHANGE UP
WBC # BLD: 3.57 K/UL — LOW (ref 3.8–10.5)
WBC # FLD AUTO: 3.57 K/UL — LOW (ref 3.8–10.5)

## 2020-02-28 PROCEDURE — 82962 GLUCOSE BLOOD TEST: CPT

## 2020-02-28 PROCEDURE — 86901 BLOOD TYPING SEROLOGIC RH(D): CPT

## 2020-02-28 PROCEDURE — 83735 ASSAY OF MAGNESIUM: CPT

## 2020-02-28 PROCEDURE — C1729: CPT

## 2020-02-28 PROCEDURE — 80197 ASSAY OF TACROLIMUS: CPT

## 2020-02-28 PROCEDURE — 50434 CONVERT NEPHROSTOMY CATHETER: CPT

## 2020-02-28 PROCEDURE — 87086 URINE CULTURE/COLONY COUNT: CPT

## 2020-02-28 PROCEDURE — 87040 BLOOD CULTURE FOR BACTERIA: CPT

## 2020-02-28 PROCEDURE — C1894: CPT

## 2020-02-28 PROCEDURE — 85610 PROTHROMBIN TIME: CPT

## 2020-02-28 PROCEDURE — 84100 ASSAY OF PHOSPHORUS: CPT

## 2020-02-28 PROCEDURE — 99231 SBSQ HOSP IP/OBS SF/LOW 25: CPT

## 2020-02-28 PROCEDURE — C1769: CPT

## 2020-02-28 PROCEDURE — C1887: CPT

## 2020-02-28 PROCEDURE — 76776 US EXAM K TRANSPL W/DOPPLER: CPT

## 2020-02-28 PROCEDURE — 85730 THROMBOPLASTIN TIME PARTIAL: CPT

## 2020-02-28 PROCEDURE — C2625: CPT

## 2020-02-28 PROCEDURE — 86850 RBC ANTIBODY SCREEN: CPT

## 2020-02-28 PROCEDURE — 85027 COMPLETE CBC AUTOMATED: CPT

## 2020-02-28 PROCEDURE — 81001 URINALYSIS AUTO W/SCOPE: CPT

## 2020-02-28 PROCEDURE — 99233 SBSQ HOSP IP/OBS HIGH 50: CPT | Mod: GC,24

## 2020-02-28 PROCEDURE — 86900 BLOOD TYPING SEROLOGIC ABO: CPT

## 2020-02-28 PROCEDURE — 80048 BASIC METABOLIC PNL TOTAL CA: CPT

## 2020-02-28 PROCEDURE — 50432 PLMT NEPHROSTOMY CATHETER: CPT

## 2020-02-28 RX ORDER — NIFEDIPINE 30 MG
1 TABLET, EXTENDED RELEASE 24 HR ORAL
Qty: 0 | Refills: 0 | DISCHARGE
Start: 2020-02-28

## 2020-02-28 RX ORDER — MAGNESIUM SULFATE 500 MG/ML
1 VIAL (ML) INJECTION ONCE
Refills: 0 | Status: COMPLETED | OUTPATIENT
Start: 2020-02-28 | End: 2020-02-28

## 2020-02-28 RX ORDER — TACROLIMUS 5 MG/1
5 CAPSULE ORAL
Qty: 0 | Refills: 0 | DISCHARGE
Start: 2020-02-28

## 2020-02-28 RX ORDER — ATOVAQUONE 750 MG/5ML
5 SUSPENSION ORAL
Qty: 0 | Refills: 0 | DISCHARGE

## 2020-02-28 RX ORDER — TAMSULOSIN HYDROCHLORIDE 0.4 MG/1
1 CAPSULE ORAL
Qty: 0 | Refills: 0 | DISCHARGE
Start: 2020-02-28

## 2020-02-28 RX ORDER — FAMOTIDINE 10 MG/ML
1 INJECTION INTRAVENOUS
Qty: 0 | Refills: 0 | DISCHARGE
Start: 2020-02-28

## 2020-02-28 RX ORDER — NIFEDIPINE 30 MG
2 TABLET, EXTENDED RELEASE 24 HR ORAL
Qty: 0 | Refills: 0 | DISCHARGE
Start: 2020-02-28

## 2020-02-28 RX ORDER — GABAPENTIN 400 MG/1
1 CAPSULE ORAL
Qty: 0 | Refills: 0 | DISCHARGE
Start: 2020-02-28

## 2020-02-28 RX ORDER — CALCIUM CARBONATE 500(1250)
1 TABLET ORAL
Qty: 0 | Refills: 0 | DISCHARGE
Start: 2020-02-28

## 2020-02-28 RX ORDER — SITAGLIPTIN 50 MG/1
0.5 TABLET, FILM COATED ORAL
Qty: 0 | Refills: 0 | DISCHARGE

## 2020-02-28 RX ORDER — NITROGLYCERIN 6.5 MG
1 CAPSULE, EXTENDED RELEASE ORAL
Qty: 30 | Refills: 0
Start: 2020-02-28 | End: 2020-03-28

## 2020-02-28 RX ORDER — ALLOPURINOL 300 MG
1 TABLET ORAL
Qty: 0 | Refills: 0 | DISCHARGE
Start: 2020-02-28

## 2020-02-28 RX ORDER — SITAGLIPTIN 50 MG/1
0.5 TABLET, FILM COATED ORAL
Qty: 15 | Refills: 0
Start: 2020-02-28 | End: 2020-03-28

## 2020-02-28 RX ORDER — HYDROCORTISONE 1 %
1 OINTMENT (GRAM) TOPICAL
Qty: 30 | Refills: 0
Start: 2020-02-28 | End: 2020-03-28

## 2020-02-28 RX ORDER — METOPROLOL TARTRATE 50 MG
1 TABLET ORAL
Qty: 0 | Refills: 0 | DISCHARGE
Start: 2020-02-28

## 2020-02-28 RX ORDER — SODIUM CHLORIDE 9 MG/ML
10 INJECTION INTRAMUSCULAR; INTRAVENOUS; SUBCUTANEOUS
Qty: 10 | Refills: 0
Start: 2020-02-28 | End: 2020-03-28

## 2020-02-28 RX ORDER — MYCOPHENOLATE MOFETIL 250 MG/1
1000 CAPSULE ORAL
Qty: 0 | Refills: 0 | DISCHARGE
Start: 2020-02-28

## 2020-02-28 RX ADMIN — Medication 15 MILLILITER(S): at 14:55

## 2020-02-28 RX ADMIN — Medication 1 TABLET(S): at 14:54

## 2020-02-28 RX ADMIN — Medication 1 APPLICATION(S): at 12:08

## 2020-02-28 RX ADMIN — Medication 1 SUPPOSITORY(S): at 12:09

## 2020-02-28 RX ADMIN — Medication 30 MILLIGRAM(S): at 05:26

## 2020-02-28 RX ADMIN — Medication 15 MILLILITER(S): at 05:26

## 2020-02-28 RX ADMIN — Medication 5 MILLIGRAM(S): at 05:26

## 2020-02-28 RX ADMIN — TACROLIMUS 5 MILLIGRAM(S): 5 CAPSULE ORAL at 07:54

## 2020-02-28 RX ADMIN — Medication 100 MILLIGRAM(S): at 05:26

## 2020-02-28 RX ADMIN — MYCOPHENOLATE MOFETIL 1000 MILLIGRAM(S): 250 CAPSULE ORAL at 05:26

## 2020-02-28 RX ADMIN — Medication 1 TABLET(S): at 05:26

## 2020-02-28 RX ADMIN — Medication 1 TABLET(S): at 12:09

## 2020-02-28 RX ADMIN — Medication 100 GRAM(S): at 07:53

## 2020-02-28 RX ADMIN — Medication 100 MILLIGRAM(S): at 12:09

## 2020-02-28 RX ADMIN — FAMOTIDINE 20 MILLIGRAM(S): 10 INJECTION INTRAVENOUS at 12:10

## 2020-02-28 RX ADMIN — GABAPENTIN 300 MILLIGRAM(S): 400 CAPSULE ORAL at 12:09

## 2020-02-28 NOTE — DISCHARGE NOTE PROVIDER - NSDCHHNEEDSERVICE_GEN_ALL_CORE
Other, specify.../Observation and assessment/Teaching and training/Medication teaching and assessment

## 2020-02-28 NOTE — DISCHARGE NOTE PROVIDER - NSDCFUADDAPPT_GEN_ALL_CORE_FT
1. Please call to make a follow-up appointment at the [facility] with Dr. Livia aguero Thursday, March 5, 2020.   Phone: [phone]  2. Please follow up with your primary care physician in one week regarding your hospitalization. 1. Please call to make a follow-up appointment at the Transplant Clinic with Dr. Livia aguero Thursday, March 5, 2020.   Phone: [phone]  2. Please call to make an appointment with colorectal surgeon, Dr. Morrison for next week.   2. Please follow up with your primary care physician in one week regarding your hospitalization.

## 2020-02-28 NOTE — PROGRESS NOTE ADULT - ASSESSMENT
68 y/o/M with a PMH of ESRD 2/2 bilateral nephrectomy from neoplasm, hypertension, and NIDDM s/p HCV + DDRT (7/2018) with complicated post op course, including DGF, ATN/mild rejection, hydronephrosis, perinephric collection   E coli bacteremia 2/2 urosepsis, CMV viremia, severe CHELA 2/2 FK toxicity and ACR 1B.     Admitted with CHELA and hydronephrosis       [] S/P DDRT 7/2018, admitted with hydronephrosis and CHELA   - S/P IR PCN placement 2/25, PCN-U  (2/27)  - plan to cap PCN today  - Immuno: Envarsus per level, MMF 1g bid, Pred 5mg daily   - Strict I/Os  - Regular diet  - Continue home meds    [] DM  - Continue ISS monitor BG    [] HTN  - BP controlled  - Continue Nifedipine 30 QD, Lopressor 100mg QD     [] Dispo:   - dc today  - F/U next week  - PCN bag provided to pt

## 2020-02-28 NOTE — DISCHARGE NOTE PROVIDER - CARE PROVIDERS DIRECT ADDRESSES
[email or web address]

## 2020-02-28 NOTE — DISCHARGE NOTE PROVIDER - NSDCMRMEDTOKEN_GEN_ALL_CORE_FT
allopurinol 100 mg oral tablet: 1 tab(s) orally once a day  atovaquone 750 mg/5 mL oral suspension: 5 milliliter(s) orally 2 times a day  B Complex 50 oral tablet, extended release: 1 tab(s) orally once a day  calcium carbonate 500 mg (200 mg elemental calcium) oral tablet, chewable: 1 tab(s) orally 3 times a day  famotidine 20 mg oral tablet: 1 tab(s) orally once a day  gabapentin 300 mg oral capsule: 1 cap(s) orally once a day  hydrocortisone 25 mg rectal suppository: 1 suppository(ies) rectal once a day  Januvia 50 mg oral tablet: 0.5 tab(s) orally once a day  metoprolol succinate 100 mg oral tablet, extended release: 1 tab(s) orally once a day  mycophenolate mofetil 250 mg oral capsule: 1000 milligram(s) orally 2 times a day  NIFEdipine 30 mg oral tablet, extended release: 1 tab(s) orally once a day  nitroglycerin 0.4% rectal ointment: 1 application rectal once a day  Normal Saline Flush 0.9% injectable solution: Flush abdominal catheter with 10ml 3 times a week   predniSONE 5 mg oral tablet: 1 tab(s) orally once a day  sodium citrate-citric acid 500 mg-334 mg/5 mL oral solution: 15 milliliter(s) orally 3 times a day  tacrolimus 1 mg oral tablet, extended release: 5 tab(s) orally   tamsulosin 0.4 mg oral capsule: 1 cap(s) orally once a day (at bedtime) 0.2% NIFEdipine rectal ointment: 1 application rectal 2 times a day   allopurinol 100 mg oral tablet: 1 tab(s) orally once a day  B Complex 50 oral tablet, extended release: 1 tab(s) orally once a day  calcium carbonate 500 mg (200 mg elemental calcium) oral tablet, chewable: 1 tab(s) orally 3 times a day  famotidine 20 mg oral tablet: 1 tab(s) orally once a day  gabapentin 300 mg oral capsule: 1 cap(s) orally once a day  hydrocortisone 25 mg rectal suppository: 1 suppository(ies) rectal once a day  Januvia 50 mg oral tablet: 0.5 tab(s) orally once a day  metoprolol succinate 100 mg oral tablet, extended release: 1 tab(s) orally once a day  mycophenolate mofetil 250 mg oral capsule: 1000 milligram(s) orally 2 times a day  NIFEdipine 30 mg oral tablet, extended release: 1 tab(s) orally once a day  Normal Saline Flush 0.9% injectable solution: Flush abdominal catheter with 10ml 3 times a week   predniSONE 5 mg oral tablet: 1 tab(s) orally once a day  sodium citrate-citric acid 500 mg-334 mg/5 mL oral solution: 15 milliliter(s) orally 3 times a day  sulfamethoxazole-trimethoprim 400 mg-80 mg oral tablet: 1 tab(s) orally once a day  tacrolimus 1 mg oral tablet, extended release: 5 tab(s) orally   tamsulosin 0.4 mg oral capsule: 1 cap(s) orally once a day (at bedtime)

## 2020-02-28 NOTE — PROGRESS NOTE ADULT - SUBJECTIVE AND OBJECTIVE BOX
Interventional Radiology    S/P exchange of nephrostomy tube for Nephroureteral catheter, POD # 1.    Per d/w covering RN Dirk the tube has been capped since 9:15 AM today.      The pt denies any flank pain, abdominal pain, or N/V.      Vital Signs Last 24 Hrs  T(C): 37.2 (28 Feb 2020 13:00), Max: 37.2 (28 Feb 2020 13:00)  T(F): 98.9 (28 Feb 2020 13:00), Max: 98.9 (28 Feb 2020 13:00)  HR: 76 (28 Feb 2020 13:00) (63 - 94)  BP: 136/76 (28 Feb 2020 13:00) (116/50 - 163/72)  BP(mean): --  RR: 18 (28 Feb 2020 13:00) (18 - 18)  SpO2: 96% (28 Feb 2020 13:00) (96% - 100%)    General Appearance: NAD    Procedure Site (________):     I&O's Detail    27 Feb 2020 07:01  -  28 Feb 2020 07:00  --------------------------------------------------------  IN:    Oral Fluid: 750 mL  Total IN: 750 mL    OUT:    Nephrostomy Tube: 1870 mL    Voided: 250 mL  Total OUT: 2120 mL    Total NET: -1370 mL      28 Feb 2020 07:01  -  28 Feb 2020 13:26  --------------------------------------------------------  IN:    IV PiggyBack: 100 mL    Oral Fluid: 960 mL  Total IN: 1060 mL    OUT:    Nephrostomy Tube: 120 mL    Voided: 190 mL  Total OUT: 310 mL    Total NET: 750 mL                                9.6    3.57  )-----------( 118      ( 28 Feb 2020 06:21 )             30.4     02-28    137  |  105  |  27<H>  ----------------------------<  115<H>  4.2   |  19<L>  |  2.62<H>    Ca    9.5      28 Feb 2020 06:21  Phos  3.4     02-28  Mg     1.9     02-28      PT/INR - ( 27 Feb 2020 05:46 )   PT: 10.3 sec;   INR: 0.90 ratio         PTT - ( 27 Feb 2020 05:46 )  PTT:26.8 sec    69y Male with PMH of Medial meniscus tear  Bowel obstruction  Anal fissure  Benign prostatic hypertrophy  Hepatitis C  Kidney neoplasm  ESRD (end stage renal disease)  Kidney transplant recipient  Type 2 diabetes mellitus  ESRD (end stage renal disease) on dialysis  Kidney cancer, primary, 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    Per d/w covering RN Dirk the tube has been capped since 9:15 AM today.      The pt denies any flank pain, abdominal pain, or N/V.      Vital Signs Last 24 Hrs  T(C): 37.2 (28 Feb 2020 13:00), Max: 37.2 (28 Feb 2020 13:00)  T(F): 98.9 (28 Feb 2020 13:00), Max: 98.9 (28 Feb 2020 13:00)  HR: 76 (28 Feb 2020 13:00) (63 - 94)  BP: 136/76 (28 Feb 2020 13:00) (116/50 - 163/72)  BP(mean): --  RR: 18 (28 Feb 2020 13:00) (18 - 18)  SpO2: 96% (28 Feb 2020 13:00) (96% - 100%)    General Appearance: NAD    Procedure Site (RLQ abdomen): catheter is intact and capped.  dressing c/d/i    I&O's Detail    OUT:    Nephrostomy Tube: 1870 mL        LABS:                          9.6    3.57  )-----------( 118      ( 28 Feb 2020 06:21 )             30.4     02-28    137  |  105  |  27<H>  ----------------------------<  115<H>  4.2   |  19<L>  |  2.62<H>    Ca    9.5      28 Feb 2020 06:21  Phos  3.4     02-28  Mg     1.9     02-28      PT/INR - ( 27 Feb 2020 05:46 )   PT: 10.3 sec;   INR: 0.90 ratio    PTT - ( 27 Feb 2020 05:46 )  PTT:26.8 sec    A/P   69y Male with ESRD, H/O DDRT, now with elevated creatinine and transplant kidney hydronephrosis s/p PCN of transplant kidney on 2/25/20 and now conversion of RLQ transplant kidney Nephrostomy catheter to Nephroureteral catheter on 2/27/20    Continue global medical management as per primary team.    The patient has a capping trial initiated by primary team.  It is important that if the patient develops signs of obstruction such as abdominal pain, pain around the catheter, leaking of the catheter, fevers, chills. nausea, or vomiting that the tube is immediately opened back to a leg bag.  The covering RN Dirk has kept to connecting tubes with leg bags at the bedside.  The pt should have routine exchange of the NU catheter with IR every 3 months and this can be arranged by contacting IR scheduling office at [phone].    Maintain dressing around the transplant nephroureteral tube.  Forward flush the catheter once daily while in the hospital but when she is D/C home the tube can be flushed every other day with 10 ml sterile NS (Forward flush only do not aspirate). Demonstrate to the patient and his family how to reconnect the catheter to a leg bag in case it is necessary. Pt will need VNS to help management of the catheter.     The above was d/w primary team MATY Mcduffie.     Donald Molina, RPA-C  [facility] [address]  Ext [phone] Interventional Radiology    S/P exchange of nephrostomy tube for Nephroureteral catheter, POD # 1.    Per d/w covering RN Dirk the tube has been capped since 9:15 AM today.      The pt denies any flank pain, abdominal pain, or N/V.      Vital Signs Last 24 Hrs  T(C): 37.2 (28 Feb 2020 13:00), Max: 37.2 (28 Feb 2020 13:00)  T(F): 98.9 (28 Feb 2020 13:00), Max: 98.9 (28 Feb 2020 13:00)  HR: 76 (28 Feb 2020 13:00) (63 - 94)  BP: 136/76 (28 Feb 2020 13:00) (116/50 - 163/72)  BP(mean): --  RR: 18 (28 Feb 2020 13:00) (18 - 18)  SpO2: 96% (28 Feb 2020 13:00) (96% - 100%)    General Appearance: NAD    Procedure Site (RLQ abdomen): catheter is intact and capped.  dressing c/d/i    I&O's Detail    OUT:    Nephrostomy Tube: 1870 mL        LABS:                          9.6    3.57  )-----------( 118      ( 28 Feb 2020 06:21 )             30.4     02-28    137  |  105  |  27<H>  ----------------------------<  115<H>  4.2   |  19<L>  |  2.62<H>    Ca    9.5      28 Feb 2020 06:21  Phos  3.4     02-28  Mg     1.9     02-28      PT/INR - ( 27 Feb 2020 05:46 )   PT: 10.3 sec;   INR: 0.90 ratio    PTT - ( 27 Feb 2020 05:46 )  PTT:26.8 sec    A/P   69y Male with ESRD, H/O DDRT, now with elevated creatinine and transplant kidney hydronephrosis s/p PCN of transplant kidney on 2/25/20 and now conversion of RLQ transplant kidney Nephrostomy catheter to Nephroureteral catheter on 2/27/20    Continue global medical management as per primary team.    The patient has a capping trial initiated by primary team.  It is important that if the patient develops signs of obstruction such as abdominal pain, pain around the catheter, leaking of the catheter, fevers, chills. nausea, or vomiting that the tube is immediately opened back to a leg bag.  The covering RN Dirk has kept to connecting tubes with leg bags at the bedside.  The pt should have routine exchange of the NU catheter with IR every 3 months and this can be arranged by contacting IR scheduling office at [phone].    Maintain dressing around the transplant nephroureteral tube.  Forward flush the catheter once daily while in the hospital but when she is D/C home the tube can be flushed every other day with 10 ml sterile NS (Forward flush only do not aspirate). Demonstrate to the patient and his family how to reconnect the catheter to a leg bag in case it is necessary. Pt will need VNS to help management of the catheter.     Case d/w IR attending Dr. Beebe.  The above was d/w primary team MATY Mcduffie.     Donald Molina, RPA-C  [facility] [address]  Ext [phone]

## 2020-02-28 NOTE — DISCHARGE NOTE PROVIDER - NSDCFUSCHEDAPPT_GEN_ALL_CORE_FT
JUAN CARLOS COPELAND ; 05/11/2020 ; P Nephro [address] [place] [address]  JUAN CARLOS COPELAND ; 05/11/2020 ; P Nephro [address] [place] [address]  JUAN CARLOS COPELAND ; 05/11/2020 ; P Nephro [address] [place] [address]

## 2020-02-28 NOTE — PROGRESS NOTE ADULT - REASON FOR ADMISSION
Elevated creatinine s/p DDRT
Hydronephrosis/CHELA

## 2020-02-28 NOTE — DISCHARGE NOTE PROVIDER - CARE PROVIDER_API CALL
Arian Bhakta (DO)  Nephrology  [address] [place] [address], [place] [address]  Phone: [phone]  Fax: [phone]  Follow Up Time: Arian Bhakta (DO)  Nephrology  [address] [facility], [place] [address]  Phone: [phone]  Fax: [phone]  Follow Up Time:     Benigno Morrison)  ColonRectal Surgery; Surgery  [address] [place] [address], [address]  [place] [address]  Phone: [phone]  Fax: [phone]  Follow Up Time:

## 2020-02-28 NOTE — DISCHARGE NOTE PROVIDER - PROVIDER TOKENS
PROVIDER:[TOKEN:[[id number]]] PROVIDER:[TOKEN:[[id number]]],PROVIDER:[TOKEN:[[id number]]]

## 2020-02-28 NOTE — DISCHARGE NOTE PROVIDER - NSDCCPCAREPLAN_GEN_ALL_CORE_FT
PRINCIPAL DISCHARGE DIAGNOSIS  Diagnosis: Ureteral stricture of kidney transplant  Assessment and Plan of Treatment: A drain was placed because you were found to have a stricture of the transplant ureter.  This tube is to remain closed/capped.  You should flush the catheter 3 times weekly with 10ml Normal saline.  Visiting nurse will teach you how to do this.  Should you develop abdominal pain or have any difficulty urinating or a decrease in your urine output, please open the drain and attach the collection bag as you have been instructed. You should call the Transplant Clinic immediately.         SECONDARY DISCHARGE DIAGNOSES  Diagnosis: Renal transplant recipient  Assessment and Plan of Treatment: Call transplant clinic If you developed any of the following, fever, pain, redness, swelling at incision site, cough, nausea, vomiting, painful urination, difficulty urination, or not making any urine.  NOTIFY YOUR SURGEON IF: You have any fever (over 100.4 F) or chills, persistent nausea/vomiting with inability to tolerate food or liquids, persistent diarrhea, or if your pain is not controlled on your discharge pain medications.    Diagnosis: Diabetes mellitus  Assessment and Plan of Treatment: Follow a low carb low sugar diet. Continue to take all antidiabetic medications/insulin as prescribed. Follow up with your Primary Care Doctor regularly for blood sugar/A1c checks. Be sure to see an eye doctor and foot doctor on an annual basis.    Diagnosis: Hypertension  Assessment and Plan of Treatment: Be sure to follow a low salt diet. If you have been prescribed antihypertensive medications to control your blood pressure, be sure to take them every day as prescribed and do not miss any doses, the medications do not work if they are not taken consistently. Follow up with your Primary Care Doctor and have your Blood Pressure checked regularly    Diagnosis: Immunosuppression  Assessment and Plan of Treatment: Keep away from people who have cough, cold, and symptom of flu.  Only take medications that are on your discharge list  If you missed your medications call the transplant office, do not double up medication because you missed a dose.  If you have any question regarding your medication please call transplant office.

## 2020-02-28 NOTE — DISCHARGE NOTE NURSING/CASE MANAGEMENT/SOCIAL WORK - PATIENT PORTAL LINK FT
You can access the FollowMyHealth Patient Portal offered by [facility] by registering at the following website: [email or web address]. By joining [facility]’s FollowMyHealth portal, you will also be able to view your health information using other applications (apps) compatible with our system.

## 2020-02-28 NOTE — PROGRESS NOTE ADULT - ATTENDING COMMENTS
s/p DDRT with ureteral stricture s/p percutaneous nephrostomy  cap tube today and monitor urine output  envarsus 5mg, prednisone 5mg daily, cellcept 1gm bid  d/c home

## 2020-02-28 NOTE — PROGRESS NOTE ADULT - SUBJECTIVE AND OBJECTIVE BOX
Transplant Surgery - Multidisciplinary Rounds  --------------------------------------------------------------  R DDRT 2018- Admitted for CHELA,  hydronephrosis       Present:  Patient seen with multidisciplinary team including Transplant Surgeon: Dr. Nicole, Dr. Marie, Transplant Nephrologist: Dr. Bhakta.  Pharmacist: Noa Rodriguez, Nurse Practitioner: MATY Wick, transplant resident Dr. Hassan and Dr. Connolly and unit RN during am rounds and examined with Dr. Nicole. Disciplines not in attendance will be notified of the plan  HPI: 70 y/o/M with a PMH of ESRD 2/2 bilateral nephrectomy from neoplasm, hypertension, and NIDDM.   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  Re-admitted in 2019 with E coli bacteremia/urosepsis; treated w/ IV vanc/cefepime/zosyn.   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  Re-admitted in 2019 with severe CHELA 2/2 FK toxicity (level on admission was 39), +UA, was treated w/ cefepime.   Re-admitted in 2019 w/ uptrending Cr on outpatient labs (3.4), was  treated with pulse steroid for ACR 1B.     Admitted  with uptrending creatinine and US with hydronephrosis and hydroureter. Underwent PCN placement with IR  and PCNU on  (showed proximal ureteral stricture)     Interval Events:    - afebrile, VSS  - s/p PCNU yesterday; capped this am  - creat 2.6, u/o ~2L  - bld/urine cx neg to date    Potential Discharge date: today    Education:  Medications    Plan of care:  See Below    MEDICATIONS  (STANDING):  allopurinol 100 milliGRAM(s) Oral daily  calcium carbonate    500 mG (Tums) Chewable 1 Tablet(s) Chew three times a day  citric acid/sodium citrate Solution 15 milliLiter(s) Oral three times a day  dextrose 5%. 1000 milliLiter(s) (50 mL/Hr) IV Continuous <Continuous>  dextrose 50% Injectable 12.5 Gram(s) IV Push once  dextrose 50% Injectable 25 Gram(s) IV Push once  dextrose 50% Injectable 25 Gram(s) IV Push once  famotidine    Tablet 20 milliGRAM(s) Oral daily  gabapentin 300 milliGRAM(s) Oral daily  hydrocortisone hemorrhoidal Suppository 1 Suppository(s) Rectal daily  insulin lispro (HumaLOG) corrective regimen sliding scale   SubCutaneous three times a day before meals  insulin lispro (HumaLOG) corrective regimen sliding scale   SubCutaneous at bedtime  metoprolol succinate  milliGRAM(s) Oral daily  multivitamin 1 Tablet(s) Oral daily  mycophenolate mofetil 1000 milliGRAM(s) Oral two times a day  NIFEdipine XL 30 milliGRAM(s) Oral daily  nitroglycerin  0.2% Ointment Compound 1 Application(s) Rectal daily  predniSONE   Tablet 5 milliGRAM(s) Oral daily  tacrolimus ER Tablet (ENVARSUS XR) 5 milliGRAM(s) Oral <User Schedule>  tamsulosin 0.4 milliGRAM(s) Oral at bedtime  trimethoprim   80 mG/sulfamethoxazole 400 mG 1 Tablet(s) Oral daily    MEDICATIONS  (PRN):  dextrose 40% Gel 15 Gram(s) Oral once PRN Blood Glucose LESS THAN 70 milliGRAM(s)/deciLiter  diphenoxylate/atropine 1 Tablet(s) Oral daily PRN Diarrhea  glucagon  Injectable 1 milliGRAM(s) IntraMuscular once PRN Glucose <70 milliGRAM(s)/deciLiter      PAST MEDICAL & SURGICAL HISTORY:  Medial meniscus tear: Right  Bowel obstruction:  surgically treated  Anal fissure:  No surgery  Benign prostatic hypertrophy  Hepatitis C: In Donor Kidney: patient received treatment for Hep. C : post treatment: patient  tested Negative for Hep C  Kidney neoplasm: s/p bilateral Nephrectomy   ESRD (end stage renal disease): On Dialysis   to 2018  Type 2 diabetes mellitus:   HTN (hypertension)  Kidney transplant recipient: 2018  @ [facility] :  +  Hep C Donor  H/O ileostomy: 2017   for 3 months. 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    Vital Signs Last 24 Hrs  T(C): 37.1 (2020 09:00), Max: 37.1 (2020 09:00)  T(F): 98.7 (:), Max: 98.7 (:)  HR: 70 () (63 - 94)  BP: 129/67 (:) (116/50 - 163/72)  BP(mean): --  RR: 18 (:) (18 - 18)  SpO2: 96% (:) (96% - 100%)    I&O's Summary    2020 07:01  -  2020 07:00  --------------------------------------------------------  IN: 750 mL / OUT: 2120 mL / NET: -1370 mL    2020 07:01  -  2020 11:33  --------------------------------------------------------  IN: 820 mL / OUT: 210 mL / NET: 610 mL                         9.6    3.57  )-----------( 118      ( 2020 06:21 )             30.4         137  |  105  |  27<H>  ----------------------------<  115<H>  4.2   |  19<L>  |  2.62<H>    Ca    9.5      2020 06:21  Phos  3.4       Mg     1.9           Tacrolimus (), Serum: 7.3 ng/mL ( @ 07:37)    Culture - Blood (collected 20 @ 09:04)  Source: .Blood Blood  Preliminary Report (20 @ 10:01):    No growth to date.    Culture - Blood (collected 20 @ 09:04)  Source: .Blood Blood  Preliminary Report (20 @ 10:01):    No growth to date.    Culture - Urine (collected 20 @ 23:18)  Source: .Urine Nephrostomy - Right  Final Report (20 @ 17:54):    No growth at 48 hours    Culture - Urine (collected 20 @ 18:54)  Source: .Urine Clean Catch (Midstream)  Final Report (20 @ 18:58):    <10,000 CFU/mL Normal Urogenital Rae      Review of systems  Gen: No weight changes, fatigue, fevers/chills, weakness  Skin: No rashes  Head/Eyes/Ears/Mouth: No headache; Normal hearing; Normal vision w/o blurriness; No sinus pain/discomfort, sore throat  Respiratory: No dyspnea, cough, wheezing, hemoptysis  CV: No chest pain, PND, orthopnea  GI: No  abdominal pain at surgical site, No diarrhea, constipation, nausea, vomiting, melena, hematochezia  : No increased frequency, dysuria, hematuria, nocturia  MSK: No joint pain/swelling; no back pain; no edema  Neuro: No dizziness/lightheadedness, weakness, seizures, numbness, tingling  Heme: No easy bruising or bleeding  Endo: No heat/cold intolerance  Psych: No significant nervousness, anxiety, stress, depression  All other systems were reviewed and are negative, except as noted.      PHYSICAL EXAM:  Constitutional: Well developed / well nourished  Eyes: Anicteric, PERRLA  ENMT: nc/at  Neck: supple  Respiratory: CTA B/L  Cardiovascular: RRR  Gastrointestinal: Soft abdomen, non tender to touch  ND  Genitourinary: PCN to bag  Extremities: no edema, scds in place  Vascular: Palpable dp pulses bilaterally  Neurological: A&O x3  Skin: no rash or lesions   Musculoskeletal: Moving all extremities  Psychiatric: Responsive

## 2020-02-28 NOTE — DISCHARGE NOTE NURSING/CASE MANAGEMENT/SOCIAL WORK - NSDCFUADDAPPT_GEN_ALL_CORE_FT
1. Please call to make a follow-up appointment at the Transplant Clinic with Dr. Livia aguero Thursday, March 5, 2020.   Phone: [phone]  2. Please follow up with your primary care physician in one week regarding your hospitalization.

## 2020-03-02 LAB
CULTURE RESULTS: SIGNIFICANT CHANGE UP
CULTURE RESULTS: SIGNIFICANT CHANGE UP
SPECIMEN SOURCE: SIGNIFICANT CHANGE UP
SPECIMEN SOURCE: SIGNIFICANT CHANGE UP

## 2020-03-05 ENCOUNTER — APPOINTMENT (OUTPATIENT)
Dept: NEPHROLOGY | Facility: CLINIC | Age: 70
End: 2020-03-05
Payer: MEDICARE

## 2020-03-05 VITALS
WEIGHT: 215 LBS | RESPIRATION RATE: 16 BRPM | HEIGHT: 71 IN | DIASTOLIC BLOOD PRESSURE: 60 MMHG | SYSTOLIC BLOOD PRESSURE: 123 MMHG | BODY MASS INDEX: 30.1 KG/M2 | HEART RATE: 72 BPM | TEMPERATURE: 98.4 F | OXYGEN SATURATION: 97 %

## 2020-03-05 PROCEDURE — 99214 OFFICE O/P EST MOD 30 MIN: CPT

## 2020-03-05 NOTE — PHYSICAL EXAM
[General Appearance - Alert] : alert [General Appearance - In No Acute Distress] : in no acute distress [General Appearance - Well Nourished] : well nourished [General Appearance - Well Developed] : well developed [General Appearance - Well-Appearing] : healthy appearing [] : normal voice and communication [Sclera] : the sclera and conjunctiva were normal [Neck Appearance] : the appearance of the neck was normal [Heart Rate And Rhythm] : heart rate was normal and rhythm regular [Heart Sounds] : normal S1 and S2 [Edema] : there was no peripheral edema [Bowel Sounds] : normal bowel sounds [Abdomen Soft] : soft [Abdomen Tenderness] : non-tender [No CVA Tenderness] : no ~M costovertebral angle tenderness [No Focal Deficits] : no focal deficits [Oriented To Time, Place, And Person] : oriented to person, place, and time [Impaired Insight] : insight and judgment were intact [FreeTextEntry1] : nephrostomy tube in place and capped

## 2020-03-05 NOTE — ASSESSMENT
[FreeTextEntry1] : 1. Kidney transplantation - Pts aaron creatinine 1.9 but had CHELA with pyelonephritis. Has had multiple episodes of CHELA. 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  Will plan to send back to IR for dilation of proximal stricture.  \par 2. Immunosuppression - simulect induction, tacrolimus target 5-7, Envarsus 6 mg, MMF 1gm BID in setting of prior recent rejection. \par 3. DM2 - on januvia and glimepiride. BG controlled at home.  A1c at goal. \par 4. HTN - controlled today\par 5. HCV - genotype 3a. completed Epclusa. Last vl note detected. \par 6. Anemia - due to CKD/CHELA.  last Hb at goal. \par 7. Chronic diarrhea - on and off on lomotil prn. Has seen GI and had a colonoscopy. Has history of small bowel resection which may be the cause of diarrhea. Asked pt to use lomotil at night. \par 8. 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 \par 9. CMV - pcr last <50 - will check again today in setting of diarrhea worsening.  \par \par \par f/u in 1-2 months\par

## 2020-03-05 NOTE — HISTORY OF PRESENT ILLNESS
[FreeTextEntry1] : Mr. Medrano is a 67 yrs old male ESRD secondary to DM2 (HD since 2015, followed by Dr. Bonilla) s/p  donor kidney transplant on 18.\par HCV donor. 40 yrs old male. cold ischemia time 23 hrs. delayed graft function. \par \par PT was readmitted for anemia and hematoma. Required 2 kidney biopsies which revealed few oxalate crystals and borderline rejection for which he was treated with solumedrol 375 and prednisone taper. \par \par Interval events:\par Pt recently admitted for CHELA and hydronephrosis.  Had nephrostomy tube then NU tube placed.  CHELA improved.   Feels well.

## 2020-03-06 LAB
ALBUMIN SERPL ELPH-MCNC: 4.1 G/DL
ALP BLD-CCNC: 74 U/L
ALT SERPL-CCNC: 17 U/L
ANION GAP SERPL CALC-SCNC: 15 MMOL/L
APPEARANCE: CLEAR
AST SERPL-CCNC: 31 U/L
BACTERIA: NEGATIVE
BASOPHILS # BLD AUTO: 0.03 K/UL
BASOPHILS NFR BLD AUTO: 0.9 %
BILIRUB SERPL-MCNC: 0.3 MG/DL
BILIRUBIN URINE: NEGATIVE
BLOOD URINE: ABNORMAL
BUN SERPL-MCNC: 22 MG/DL
CALCIUM SERPL-MCNC: 10 MG/DL
CHLORIDE SERPL-SCNC: 102 MMOL/L
CO2 SERPL-SCNC: 24 MMOL/L
COLOR: NORMAL
CREAT SERPL-MCNC: 2.62 MG/DL
CREAT SPEC-SCNC: 146 MG/DL
CREAT/PROT UR: 0.3 RATIO
EOSINOPHIL # BLD AUTO: 0.05 K/UL
EOSINOPHIL NFR BLD AUTO: 1.5 %
GLUCOSE QUALITATIVE U: NEGATIVE
GLUCOSE SERPL-MCNC: 207 MG/DL
HCT VFR BLD CALC: 35 %
HGB BLD-MCNC: 10.3 G/DL
HYALINE CASTS: 1 /LPF
IMM GRANULOCYTES NFR BLD AUTO: 2.8 %
KETONES URINE: NEGATIVE
LDH SERPL-CCNC: 276 U/L
LEUKOCYTE ESTERASE URINE: NEGATIVE
LYMPHOCYTES # BLD AUTO: 1.18 K/UL
LYMPHOCYTES NFR BLD AUTO: 36.1 %
MAGNESIUM SERPL-MCNC: 1.7 MG/DL
MAN DIFF?: NORMAL
MCHC RBC-ENTMCNC: 29.4 GM/DL
MCHC RBC-ENTMCNC: 30.1 PG
MCV RBC AUTO: 102.3 FL
MICROSCOPIC-UA: NORMAL
MONOCYTES # BLD AUTO: 0.3 K/UL
MONOCYTES NFR BLD AUTO: 9.2 %
NEUTROPHILS # BLD AUTO: 1.62 K/UL
NEUTROPHILS NFR BLD AUTO: 49.5 %
NITRITE URINE: NEGATIVE
PH URINE: 6
PHOSPHATE SERPL-MCNC: 3.3 MG/DL
PLATELET # BLD AUTO: 133 K/UL
POTASSIUM SERPL-SCNC: 5.5 MMOL/L
PROT SERPL-MCNC: 6.5 G/DL
PROT UR-MCNC: 48 MG/DL
PROTEIN URINE: ABNORMAL
RBC # BLD: 3.42 M/UL
RBC # FLD: 14.9 %
RED BLOOD CELLS URINE: 7 /HPF
SODIUM SERPL-SCNC: 141 MMOL/L
SPECIFIC GRAVITY URINE: 1.02
SQUAMOUS EPITHELIAL CELLS: 1 /HPF
TACROLIMUS SERPL-MCNC: 10.6 NG/ML
URATE SERPL-MCNC: 5.5 MG/DL
UROBILINOGEN URINE: NORMAL
WBC # FLD AUTO: 3.27 K/UL
WHITE BLOOD CELLS URINE: 6 /HPF

## 2020-03-06 RX ORDER — TACROLIMUS 1 MG/1
1 TABLET, EXTENDED RELEASE ORAL DAILY
Qty: 30 | Refills: 11 | Status: DISCONTINUED | COMMUNITY
Start: 2019-09-12 | End: 2020-03-06

## 2020-03-09 LAB — BKV DNA SPEC QL NAA+PROBE: NOT DETECTED COPIES/ML

## 2020-03-10 LAB
CMV DNA SPEC QL NAA+PROBE: NOT DETECTED
CMVPCR LOG: NOT DETECTED LOG10IU/ML

## 2020-03-18 ENCOUNTER — LABORATORY RESULT (OUTPATIENT)
Age: 70
End: 2020-03-18

## 2020-03-18 ENCOUNTER — APPOINTMENT (OUTPATIENT)
Dept: TRANSPLANT | Facility: CLINIC | Age: 70
End: 2020-03-18

## 2020-03-18 LAB
ALBUMIN SERPL ELPH-MCNC: 4.4 G/DL
ALP BLD-CCNC: 86 U/L
ALT SERPL-CCNC: 27 U/L
ANION GAP SERPL CALC-SCNC: 13 MMOL/L
APPEARANCE: CLEAR
AST SERPL-CCNC: 34 U/L
BACTERIA: NEGATIVE
BASOPHILS # BLD AUTO: 0.07 K/UL
BASOPHILS NFR BLD AUTO: 1.8 %
BILIRUB SERPL-MCNC: 0.2 MG/DL
BILIRUBIN URINE: NEGATIVE
BLOOD URINE: NEGATIVE
BUN SERPL-MCNC: 30 MG/DL
CALCIUM SERPL-MCNC: 9.8 MG/DL
CHLORIDE SERPL-SCNC: 107 MMOL/L
CO2 SERPL-SCNC: 24 MMOL/L
COLOR: YELLOW
CREAT SERPL-MCNC: 2.68 MG/DL
CREAT SPEC-SCNC: 145 MG/DL
CREAT/PROT UR: 0.1 RATIO
EOSINOPHIL # BLD AUTO: 0.3 K/UL
EOSINOPHIL NFR BLD AUTO: 7.8 %
GLUCOSE QUALITATIVE U: NEGATIVE
GLUCOSE SERPL-MCNC: 174 MG/DL
HCT VFR BLD CALC: 34.9 %
HGB BLD-MCNC: 10.5 G/DL
HYALINE CASTS: 0 /LPF
KETONES URINE: NEGATIVE
LDH SERPL-CCNC: 293 U/L
LEUKOCYTE ESTERASE URINE: NEGATIVE
LYMPHOCYTES # BLD AUTO: 1.11 K/UL
LYMPHOCYTES NFR BLD AUTO: 28.7 %
MAGNESIUM SERPL-MCNC: 1.5 MG/DL
MAN DIFF?: NORMAL
MCHC RBC-ENTMCNC: 30.1 GM/DL
MCHC RBC-ENTMCNC: 30.6 PG
MCV RBC AUTO: 101.7 FL
MICROSCOPIC-UA: NORMAL
MONOCYTES # BLD AUTO: 0.07 K/UL
MONOCYTES NFR BLD AUTO: 1.7 %
NEUTROPHILS # BLD AUTO: 2.32 K/UL
NEUTROPHILS NFR BLD AUTO: 60 %
NITRITE URINE: NEGATIVE
PH URINE: 6
PHOSPHATE SERPL-MCNC: 3.1 MG/DL
PLATELET # BLD AUTO: 151 K/UL
POTASSIUM SERPL-SCNC: 5 MMOL/L
PROT SERPL-MCNC: 6.5 G/DL
PROT UR-MCNC: 20 MG/DL
PROTEIN URINE: NORMAL
RBC # BLD: 3.43 M/UL
RBC # FLD: 14.4 %
RED BLOOD CELLS URINE: 3 /HPF
SODIUM SERPL-SCNC: 144 MMOL/L
SPECIFIC GRAVITY URINE: 1.02
SQUAMOUS EPITHELIAL CELLS: 2 /HPF
TACROLIMUS SERPL-MCNC: 6.4 NG/ML
URATE SERPL-MCNC: 5.6 MG/DL
UROBILINOGEN URINE: NORMAL
WBC # FLD AUTO: 3.87 K/UL
WHITE BLOOD CELLS URINE: 8 /HPF

## 2020-03-19 LAB
CMV DNA SPEC QL NAA+PROBE: ABNORMAL IU/ML
CMVPCR LOG: ABNORMAL LOG10IU/ML

## 2020-03-20 LAB — BKV DNA SPEC QL NAA+PROBE: NOT DETECTED COPIES/ML

## 2020-04-02 ENCOUNTER — APPOINTMENT (OUTPATIENT)
Dept: NEPHROLOGY | Facility: CLINIC | Age: 70
End: 2020-04-02

## 2020-04-02 ENCOUNTER — APPOINTMENT (OUTPATIENT)
Dept: NEPHROLOGY | Facility: CLINIC | Age: 70
End: 2020-04-02
Payer: MEDICARE

## 2020-04-02 PROCEDURE — 99214 OFFICE O/P EST MOD 30 MIN: CPT | Mod: 95

## 2020-04-02 NOTE — ASSESSMENT
[FreeTextEntry1] : Clinical Impression:\par Kidney Transplant recipient, functioning allograft, elevated creatinine\par Ureteral stricture, has NU catheter, with external port capped\par Immunosuppression- Reviewed , no changes made today.\par DM Controlled.\par HTN controlled\par Plan:\par Immunosuppression continue current regimen\par Continue current medications\par additional suggestions: Lab check in 2 weeks, earlier if any new symptoms\par Continue with NU catheter, that has been capped. Advised to report any fever/changes in urine out put or any new symptoms.Definitive plan of repair of ureteral stricture to be planned once COVID epidemic subsides\par Labs and follow up visit to be scheduled as discussed.\par Discussed COVID infection prevention strategies including handwashing, social distancing and monitoring for any signs or symptoms.\par \par

## 2020-04-02 NOTE — HISTORY OF PRESENT ILLNESS
[Home] : at home, [unfilled] , at the time of the visit. [Medical Office: ([facility])___] : at ~his/her~ medical office located in [place] [Patient] : the patient [Self] : self [FreeTextEntry1] : This visit is provided by telehealth using real time 2 way audio visual technology. This patient is located in his car near his home and the provider is located at the [facility] medical office at [address] [place] [address]. Patient  participated in this visit.\par Verbal consent for this visit was given by patient/accompanying family member\par Total duration of this visit which included conversation, lab review, medication review and clinical assessment and advice lasted approximately 30 minutes.\par Patient has Nu catheter and his creatinine has been 2.6 in the last two labs reports\par No recent changes in medications reported. Patient reports he had a visiting nurse come and flush the catheter in the past twice. reports no changes in urine output.\par \par Currently:\par \par Patient feels well\par Reviewed for acute symptoms-currently has none.\par Taking precautions to prevent COVID exposure\par I reviewed current home  blood pressure and home glucose control and medications.\par Blood pressure: 132/82 Temp 97.6 deg F and Home glucose in AM: 120 mg/dl\par On Telehealth visit:\par Patient appears comfortable\par No distress, not dyspneic\par Conscious, alert, oriented X 3\par Speech: Normal\par Other observations as noted\par Reviewed last lab data \par

## 2020-05-07 ENCOUNTER — APPOINTMENT (OUTPATIENT)
Dept: TRANSPLANT | Facility: CLINIC | Age: 70
End: 2020-05-07

## 2020-05-07 ENCOUNTER — LABORATORY RESULT (OUTPATIENT)
Age: 70
End: 2020-05-07

## 2020-05-08 LAB
ALBUMIN SERPL ELPH-MCNC: 4.2 G/DL
ALP BLD-CCNC: 61 U/L
ALT SERPL-CCNC: 16 U/L
ANION GAP SERPL CALC-SCNC: 14 MMOL/L
APPEARANCE: CLEAR
AST SERPL-CCNC: 27 U/L
BACTERIA: NEGATIVE
BASOPHILS # BLD AUTO: 0.03 K/UL
BASOPHILS NFR BLD AUTO: 1.4 %
BILIRUB SERPL-MCNC: 0.2 MG/DL
BILIRUBIN URINE: NEGATIVE
BLOOD URINE: NORMAL
BUN SERPL-MCNC: 25 MG/DL
CALCIUM SERPL-MCNC: 9.6 MG/DL
CHLORIDE SERPL-SCNC: 105 MMOL/L
CO2 SERPL-SCNC: 21 MMOL/L
COLOR: NORMAL
CREAT SERPL-MCNC: 2.21 MG/DL
CREAT SPEC-SCNC: 142 MG/DL
CREAT/PROT UR: 0.1 RATIO
EOSINOPHIL # BLD AUTO: 0.08 K/UL
EOSINOPHIL NFR BLD AUTO: 3.7 %
GLUCOSE QUALITATIVE U: NEGATIVE
GLUCOSE SERPL-MCNC: 188 MG/DL
HCT VFR BLD CALC: 36.4 %
HGB BLD-MCNC: 11 G/DL
HYALINE CASTS: 2 /LPF
IMM GRANULOCYTES NFR BLD AUTO: 1.4 %
KETONES URINE: NEGATIVE
LDH SERPL-CCNC: 276 U/L
LEUKOCYTE ESTERASE URINE: NEGATIVE
LYMPHOCYTES # BLD AUTO: 0.96 K/UL
LYMPHOCYTES NFR BLD AUTO: 44 %
MAGNESIUM SERPL-MCNC: 1.5 MG/DL
MAN DIFF?: NORMAL
MCHC RBC-ENTMCNC: 30.2 GM/DL
MCHC RBC-ENTMCNC: 30.6 PG
MCV RBC AUTO: 101.1 FL
MICROSCOPIC-UA: NORMAL
MONOCYTES # BLD AUTO: 0.11 K/UL
MONOCYTES NFR BLD AUTO: 5 %
NEUTROPHILS # BLD AUTO: 0.97 K/UL
NEUTROPHILS NFR BLD AUTO: 44.5 %
NITRITE URINE: NEGATIVE
PH URINE: 6
PHOSPHATE SERPL-MCNC: 2.8 MG/DL
PLATELET # BLD AUTO: 129 K/UL
POTASSIUM SERPL-SCNC: 4.7 MMOL/L
PROT SERPL-MCNC: 6.2 G/DL
PROT UR-MCNC: 15 MG/DL
PROTEIN URINE: NORMAL
RBC # BLD: 3.6 M/UL
RBC # FLD: 14.1 %
RED BLOOD CELLS URINE: 6 /HPF
SODIUM SERPL-SCNC: 139 MMOL/L
SPECIFIC GRAVITY URINE: 1.02
SQUAMOUS EPITHELIAL CELLS: 2 /HPF
TACROLIMUS SERPL-MCNC: 4.3 NG/ML
UROBILINOGEN URINE: NORMAL
WBC # FLD AUTO: 2.18 K/UL
WHITE BLOOD CELLS URINE: 3 /HPF

## 2020-05-11 ENCOUNTER — APPOINTMENT (OUTPATIENT)
Dept: NEPHROLOGY | Facility: CLINIC | Age: 70
End: 2020-05-11
Payer: MEDICARE

## 2020-05-11 LAB
BKV DNA SPEC QL NAA+PROBE: NOT DETECTED COPIES/ML
CMV DNA SPEC QL NAA+PROBE: NOT DETECTED
CMVPCR LOG: NOT DETECTED LOG10IU/ML

## 2020-05-11 PROCEDURE — 99213 OFFICE O/P EST LOW 20 MIN: CPT | Mod: 95

## 2020-05-11 NOTE — ASSESSMENT
[FreeTextEntry1] : 1. Kidney transplantation - Pts aaron creatinine 1.9 but had CHELA with pyelonephritis. Has had multiple episodes of CHELA. 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  Creatinine has come down to 2.2.  Will plan to send back to IR for dilation of proximal stricture.  Cancelled last month due to COVID\par 2. Immunosuppression - simulect induction, tacrolimus target 5-7, Envarsus 6 mg, MMF 500mgs BID as he has diarrhea and low WBC count.   \par 3. DM2 - on januvia and glimepiride. BG controlled at home.  A1c at goal. \par 4. HTN - elevated today but will avoid changes now as he is having much diarrhea.  \par 5. HCV - genotype 3a. completed Epclusa. Last vl note detected. \par 6. Anemia - due to CKD/CHELA.  last Hb at goal. \par 7. Chronic diarrhea - on and off on lomotil prn. Has seen GI and had a colonoscopy. Has history of small bowel resection which may be the cause of diarrhea. Asked pt to use lomotil at night. Sent refill.  CMV is negative and reduced MMF to 500mgs BID which did not help.  \par 8. 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 \par 9. CMV - negative on May 7th\par \par f/u in 1 month\par

## 2020-05-11 NOTE — CONSULT LETTER
[Dear  ___] : Dear  [unfilled], [Consult Letter:] : I had the pleasure of evaluating your patient, [unfilled]. [Consult Closing:] : Thank you very much for allowing me to participate in the care of this patient.  If you have any questions, please do not hesitate to contact me. [Please see my note below.] : Please see my note below. [Sincerely,] : Sincerely, [FreeTextEntry3] : Arian Bhakta, DO

## 2020-05-11 NOTE — HISTORY OF PRESENT ILLNESS
[Home] : at home, [unfilled] , at the time of the visit. [Patient] : the patient [Medical Office: ([facility])___] : at the medical office located in  [FreeTextEntry1] : Mr. Medrano is a 67 yrs old male ESRD secondary to DM2 (HD since 2015, followed by Dr. Bonilla) s/p  donor kidney transplant on 18.\par HCV donor. 40 yrs old male. cold ischemia time 23 hrs. delayed graft function. \par \par PT was readmitted for anemia and hematoma. Required 2 kidney biopsies which revealed few oxalate crystals and borderline rejection for which he was treated with solumedrol 375 and prednisone taper. \par \par Interval events:\par Pt admitted for CHELA and hydronephrosis 2020.  Had nephrostomy tube then NU tube placed.  CHELA improved.   Pt still with on and off diarrhea.  Went 8 times yesterday.  BP today 152/78, 145 yesterday. Blood sugar has been controlled.  118 yesterday.  Today BG was 104.  Nephrostomy tube still bothering him.   [Self] : self

## 2020-05-11 NOTE — PHYSICAL EXAM
[General Appearance - Alert] : alert [General Appearance - In No Acute Distress] : in no acute distress [General Appearance - Well Developed] : well developed [General Appearance - Well Nourished] : well nourished [Sclera] : the sclera and conjunctiva were normal [Neck Appearance] : the appearance of the neck was normal [Edema] : there was no peripheral edema [Oriented To Time, Place, And Person] : oriented to person, place, and time [Impaired Insight] : insight and judgment were intact [Extraocular Movements] : extraocular movements were intact [Outer Ear] : the ears and nose were normal in appearance [Respiration, Rhythm And Depth] : normal respiratory rhythm and effort [] : no respiratory distress [Affect] : the affect was normal

## 2020-05-11 NOTE — REVIEW OF SYSTEMS
[Diarrhea] : diarrhea [Fever] : no fever [Chills] : no chills [Feeling Poorly] : not feeling poorly [Sore Throat] : no sore throat [Feeling Tired] : not feeling tired [Chest Pain] : no chest pain [Palpitations] : no palpitations [Lower Ext Edema] : no extremity edema [Shortness Of Breath] : no shortness of breath [Wheezing] : no wheezing [Cough] : no cough [SOB on Exertion] : no shortness of breath during exertion [Vomiting] : no vomiting [Abdominal Pain] : no abdominal pain [Constipation] : no constipation [Heartburn] : no heartburn [Dysuria] : no dysuria [Incontinence] : no incontinence [Nocturia] : no nocturia [Arthralgias] : no arthralgias

## 2020-05-29 ENCOUNTER — APPOINTMENT (OUTPATIENT)
Dept: INTERVENTIONAL RADIOLOGY/VASCULAR | Facility: CLINIC | Age: 70
End: 2020-05-29
Payer: MEDICARE

## 2020-05-29 DIAGNOSIS — Z12.5 ENCOUNTER FOR SCREENING FOR MALIGNANT NEOPLASM OF PROSTATE: ICD-10-CM

## 2020-05-29 DIAGNOSIS — Z85.528 PERSONAL HISTORY OF OTHER MALIGNANT NEOPLASM OF KIDNEY: ICD-10-CM

## 2020-05-29 PROCEDURE — 99214 OFFICE O/P EST MOD 30 MIN: CPT | Mod: 95

## 2020-05-29 RX ORDER — HYDROCORTISONE ACETATE 25 MG/1
25 SUPPOSITORY RECTAL
Qty: 1 | Refills: 2 | Status: DISCONTINUED | COMMUNITY
Start: 2020-03-30 | End: 2020-05-29

## 2020-05-29 NOTE — CONSULT LETTER
[Dear  ___] : Dear  [unfilled], [Consult Letter:] : I had the pleasure of evaluating your patient, [unfilled]. [Sincerely,] : Sincerely, [Please see my note below.] : Please see my note below. [Consult Closing:] : Thank you very much for allowing me to participate in the care of this patient.  If you have any questions, please do not hesitate to contact me. [FreeTextEntry3] : Ion Beebe MD\par

## 2020-05-29 NOTE — REASON FOR VISIT
[Consultation] : a consultation visit [Home] : at home, [unfilled] , at the time of the visit. [Verbal consent obtained from patient] : the patient, [unfilled] [Medical Office: ([facility])___] : at the medical office located in

## 2020-05-29 NOTE — PHYSICAL EXAM
[No Acute Distress] : no acute distress [Alert] : alert [Well Nourished] : well nourished [Well Developed] : well developed [Healthy Appearance] : healthy appearance [No Respiratory Distress] : no respiratory distress [Normal Voice/Communication] : normal voice communication [Oriented x3] : oriented to person, place, and time [Normal Rate and Effort] : normal respiratory rhythm and effort [No Accessory Muscle Use] : no accessory muscle use [Normal Insight/Judgement] : insight and judgment were intact [Normal Affect] : the affect was normal [Normal Mood] : the mood was normal [Remote Memory Normal] : remote memory was not impaired [Recent Memory Normal] : recent memory was not impaired [Restricted in physically strenuous activity but ambulatory and able to carry out work of a light or sedentary nature] : Restricted in physically strenuous activity but ambulatory and able to carry out work of a light or sedentary nature, e.g., light house work, office work

## 2020-05-29 NOTE — HISTORY OF PRESENT ILLNESS
[FreeTextEntry1] : This  is a 68 y/o male with pmhx of ESRD secondary to DM2, HTN, ESRD was on HD since 2015, now s/p  donor kidney transplant on 18 who presents today for consultation  nephroureteral stent evaluation and exchange, with possible ureteral stricture dilation. His post transplant course was complicated by anemia with hematoma and borderline rejection, which required 2 kidney biopsies.  Biopsies revealed few oxalate crystals/borderline rejection and he was treated with Solu-Medrol and prednisone taper. \par  \par Post transplantation, creatinine aaron was 1.9, but he subsequently developed  CHELA with pyelonephritis. Has had multiple episodes of CHELA, with the latest CHELA related to ureteral stricture.  Pt underwent right PCN placement on 20 and nephroureteral stent placement on 20 in IR.  Post procedure his creatinine was down to 2.2. He is now referred back to IR by his nephrologist, Dr. Bhakta,  for dilation of proximal stricture. It had been previously scheduled, but was cancelled due to COVID. Denies fever, chills, inability to void, or dysuria.   \par \par Pt admitted for CHELA and hydronephrosis 2020. Had nephrostomy tube then NU tube placed. CHELA improved. Pt still with on and off diarrhea. Went 8 times yesterday. BP today 152/78, 145 yesterday. Blood sugar has been controlled. 118 yesterday. Today BG was 104. Nephrostomy tube still bothering him.

## 2020-05-29 NOTE — ASSESSMENT
[Other: _____] : [unfilled] [FreeTextEntry1] : Mr. Medrano is a 70 y/o male with pmhx of ESRD secondary to DM2, HTN, ESRD was on HD since 2015, now s/p  donor kidney transplant on 18 who presents today for consultation for nephrostogram.  He is s/p nephroureteral stent placement in February and his creatinine has continued to trend down since the procedure. \par He now returns to discuss nephroureteral stent evaluation and exchange, with possible ureteral stricture dilation. \par \par I reviewed the procedure, including the risks, benefits, alternatives, and expected post procedure course.  He will need a urine cx and blood work, so I have ordered this.  I have provided him with the contact information for the [facility] and he will arrange for testing next week. I also discussed how COVID testing will also be required and our booking office will arrange this.  \par \par I have provided the patient the opportunity to ask questions and have answered them to his satisfaction.   He is  encouraged to contact our office with any further questions, concerns, or issues.\par

## 2020-06-08 ENCOUNTER — LABORATORY RESULT (OUTPATIENT)
Age: 70
End: 2020-06-08

## 2020-06-08 ENCOUNTER — APPOINTMENT (OUTPATIENT)
Dept: TRANSPLANT | Facility: CLINIC | Age: 70
End: 2020-06-08

## 2020-06-08 LAB
ALBUMIN SERPL ELPH-MCNC: 4.3 G/DL
ALP BLD-CCNC: 65 U/L
ALT SERPL-CCNC: 28 U/L
ANION GAP SERPL CALC-SCNC: 14 MMOL/L
AST SERPL-CCNC: 46 U/L
BASOPHILS # BLD AUTO: 0.03 K/UL
BASOPHILS NFR BLD AUTO: 0.8 %
BILIRUB SERPL-MCNC: 0.2 MG/DL
BUN SERPL-MCNC: 32 MG/DL
CALCIUM SERPL-MCNC: 10.2 MG/DL
CHLORIDE SERPL-SCNC: 106 MMOL/L
CO2 SERPL-SCNC: 20 MMOL/L
CREAT SERPL-MCNC: 3.11 MG/DL
CREAT SPEC-SCNC: 201 MG/DL
CREAT/PROT UR: 2 RATIO
EOSINOPHIL # BLD AUTO: 0.19 K/UL
EOSINOPHIL NFR BLD AUTO: 5.1 %
GLUCOSE SERPL-MCNC: 105 MG/DL
HCT VFR BLD CALC: 37.4 %
HGB BLD-MCNC: 11.6 G/DL
LDH SERPL-CCNC: 643 U/L
LYMPHOCYTES # BLD AUTO: 1.42 K/UL
LYMPHOCYTES NFR BLD AUTO: 37.8 %
MAGNESIUM SERPL-MCNC: 1.4 MG/DL
MAN DIFF?: NORMAL
MCHC RBC-ENTMCNC: 30.6 PG
MCHC RBC-ENTMCNC: 31 GM/DL
MCV RBC AUTO: 98.7 FL
MONOCYTES # BLD AUTO: 0.6 K/UL
MONOCYTES NFR BLD AUTO: 16 %
NEUTROPHILS # BLD AUTO: 1.48 K/UL
NEUTROPHILS NFR BLD AUTO: 39.5 %
PHOSPHATE SERPL-MCNC: 4 MG/DL
PLATELET # BLD AUTO: 180 K/UL
POTASSIUM SERPL-SCNC: 4.9 MMOL/L
PROT SERPL-MCNC: 6.5 G/DL
PROT UR-MCNC: 399 MG/DL
RBC # BLD: 3.79 M/UL
RBC # FLD: 13.6 %
SODIUM SERPL-SCNC: 139 MMOL/L
TACROLIMUS SERPL-MCNC: 12.3 NG/ML
URATE SERPL-MCNC: 7.5 MG/DL
WBC # FLD AUTO: 3.75 K/UL

## 2020-06-09 DIAGNOSIS — Z87.440 PERSONAL HISTORY OF URINARY (TRACT) INFECTIONS: ICD-10-CM

## 2020-06-09 LAB
APPEARANCE: ABNORMAL
BACTERIA: ABNORMAL
BILIRUBIN URINE: NEGATIVE
BLOOD URINE: ABNORMAL
COLOR: ABNORMAL
GLUCOSE QUALITATIVE U: NEGATIVE
HYALINE CASTS: 0 /LPF
KETONES URINE: NEGATIVE
LEUKOCYTE ESTERASE URINE: ABNORMAL
MICROSCOPIC-UA: NORMAL
NITRITE URINE: POSITIVE
PH URINE: 6
PROTEIN URINE: ABNORMAL
RED BLOOD CELLS URINE: 13 /HPF
SPECIFIC GRAVITY URINE: 1.02
SQUAMOUS EPITHELIAL CELLS: 2 /HPF
UROBILINOGEN URINE: NORMAL
WHITE BLOOD CELLS URINE: 140 /HPF

## 2020-06-10 ENCOUNTER — APPOINTMENT (OUTPATIENT)
Dept: NEPHROLOGY | Facility: CLINIC | Age: 70
End: 2020-06-10
Payer: MEDICARE

## 2020-06-10 ENCOUNTER — APPOINTMENT (OUTPATIENT)
Dept: NEPHROLOGY | Facility: CLINIC | Age: 70
End: 2020-06-10

## 2020-06-10 PROCEDURE — 99441: CPT | Mod: 95

## 2020-06-15 ENCOUNTER — OUTPATIENT (OUTPATIENT)
Dept: OUTPATIENT SERVICES | Facility: HOSPITAL | Age: 70
LOS: 1 days | End: 2020-06-15
Payer: MEDICARE

## 2020-06-15 ENCOUNTER — APPOINTMENT (OUTPATIENT)
Dept: TRANSPLANT | Facility: CLINIC | Age: 70
End: 2020-06-15

## 2020-06-15 DIAGNOSIS — Z94.0 KIDNEY TRANSPLANT STATUS: Chronic | ICD-10-CM

## 2020-06-15 DIAGNOSIS — Z98.890 OTHER SPECIFIED POSTPROCEDURAL STATES: Chronic | ICD-10-CM

## 2020-06-15 DIAGNOSIS — I77.0 ARTERIOVENOUS FISTULA, ACQUIRED: Chronic | ICD-10-CM

## 2020-06-15 DIAGNOSIS — Z90.5 ACQUIRED ABSENCE OF KIDNEY: Chronic | ICD-10-CM

## 2020-06-15 DIAGNOSIS — Z11.59 ENCOUNTER FOR SCREENING FOR OTHER VIRAL DISEASES: ICD-10-CM

## 2020-06-15 PROCEDURE — U0003: CPT

## 2020-06-16 LAB — SARS-COV-2 RNA SPEC QL NAA+PROBE: SIGNIFICANT CHANGE UP

## 2020-06-17 LAB
ALBUMIN SERPL ELPH-MCNC: 4 G/DL
ALP BLD-CCNC: 73 U/L
ALT SERPL-CCNC: 16 U/L
ANION GAP SERPL CALC-SCNC: 12 MMOL/L
APPEARANCE: CLEAR
AST SERPL-CCNC: 36 U/L
BACTERIA UR CULT: ABNORMAL
BACTERIA: NEGATIVE
BASOPHILS # BLD AUTO: 0.06 K/UL
BASOPHILS NFR BLD AUTO: 1.5 %
BILIRUB SERPL-MCNC: <0.2 MG/DL
BILIRUBIN URINE: NEGATIVE
BLOOD URINE: ABNORMAL
BUN SERPL-MCNC: 24 MG/DL
CALCIUM SERPL-MCNC: 9.9 MG/DL
CHLORIDE SERPL-SCNC: 104 MMOL/L
CMV DNA SPEC QL NAA+PROBE: 63 IU/ML
CMVPCR LOG: 1.8 LOG10IU/ML
CO2 SERPL-SCNC: 25 MMOL/L
COLOR: NORMAL
CREAT SERPL-MCNC: 2.54 MG/DL
CREAT SPEC-SCNC: 151 MG/DL
CREAT/PROT UR: 0.3 RATIO
EOSINOPHIL # BLD AUTO: 0.14 K/UL
EOSINOPHIL NFR BLD AUTO: 3.4 %
GLUCOSE QUALITATIVE U: NEGATIVE
GLUCOSE SERPL-MCNC: 181 MG/DL
HCT VFR BLD CALC: 35.4 %
HGB BLD-MCNC: 10.8 G/DL
HYALINE CASTS: 0 /LPF
IMM GRANULOCYTES NFR BLD AUTO: 0.7 %
KETONES URINE: NEGATIVE
LDH SERPL-CCNC: 460 U/L
LEUKOCYTE ESTERASE URINE: ABNORMAL
LYMPHOCYTES # BLD AUTO: 1.75 K/UL
LYMPHOCYTES NFR BLD AUTO: 43 %
MAGNESIUM SERPL-MCNC: 1.7 MG/DL
MAN DIFF?: NORMAL
MCHC RBC-ENTMCNC: 30.5 GM/DL
MCHC RBC-ENTMCNC: 30.9 PG
MCV RBC AUTO: 101.1 FL
MICROSCOPIC-UA: NORMAL
MONOCYTES # BLD AUTO: 0.54 K/UL
MONOCYTES NFR BLD AUTO: 13.3 %
NEUTROPHILS # BLD AUTO: 1.55 K/UL
NEUTROPHILS NFR BLD AUTO: 38.1 %
NITRITE URINE: NEGATIVE
PH URINE: 6
PHOSPHATE SERPL-MCNC: 3.2 MG/DL
PLATELET # BLD AUTO: 198 K/UL
POTASSIUM SERPL-SCNC: 5.3 MMOL/L
PROT SERPL-MCNC: 6.2 G/DL
PROT UR-MCNC: 42 MG/DL
PROTEIN URINE: ABNORMAL
RBC # BLD: 3.5 M/UL
RBC # FLD: 13.7 %
RED BLOOD CELLS URINE: 4 /HPF
SODIUM SERPL-SCNC: 141 MMOL/L
SPECIFIC GRAVITY URINE: 1.02
SQUAMOUS EPITHELIAL CELLS: 2 /HPF
TACROLIMUS SERPL-MCNC: 5.7 NG/ML
UROBILINOGEN URINE: NORMAL
WBC # FLD AUTO: 4.07 K/UL
WHITE BLOOD CELLS URINE: 5 /HPF

## 2020-06-18 ENCOUNTER — OUTPATIENT (OUTPATIENT)
Dept: OUTPATIENT SERVICES | Facility: HOSPITAL | Age: 70
LOS: 1 days | End: 2020-06-18
Payer: MEDICARE

## 2020-06-18 ENCOUNTER — RESULT REVIEW (OUTPATIENT)
Age: 70
End: 2020-06-18

## 2020-06-18 DIAGNOSIS — Z98.890 OTHER SPECIFIED POSTPROCEDURAL STATES: Chronic | ICD-10-CM

## 2020-06-18 DIAGNOSIS — Z94.0 KIDNEY TRANSPLANT STATUS: Chronic | ICD-10-CM

## 2020-06-18 DIAGNOSIS — Z90.5 ACQUIRED ABSENCE OF KIDNEY: Chronic | ICD-10-CM

## 2020-06-18 DIAGNOSIS — Z94.0 KIDNEY TRANSPLANT STATUS: ICD-10-CM

## 2020-06-18 DIAGNOSIS — I77.0 ARTERIOVENOUS FISTULA, ACQUIRED: Chronic | ICD-10-CM

## 2020-06-18 LAB — GLUCOSE BLDC GLUCOMTR-MCNC: 145 MG/DL — HIGH (ref 70–99)

## 2020-06-18 PROCEDURE — 50706 BALLOON DILATE URTRL STRIX: CPT | Mod: RT

## 2020-06-18 PROCEDURE — 50706 BALLOON DILATE URTRL STRIX: CPT

## 2020-06-18 PROCEDURE — 82962 GLUCOSE BLOOD TEST: CPT

## 2020-06-18 PROCEDURE — 50387 CHANGE NEPHROURETERAL CATH: CPT

## 2020-06-18 PROCEDURE — C1894: CPT

## 2020-06-18 PROCEDURE — 50387 CHANGE NEPHROURETERAL CATH: CPT | Mod: 50

## 2020-06-18 PROCEDURE — C2625: CPT

## 2020-06-18 PROCEDURE — C1769: CPT

## 2020-06-18 PROCEDURE — C1887: CPT

## 2020-06-18 PROCEDURE — C1725: CPT

## 2020-06-18 RX ORDER — VANCOMYCIN HCL 1 G
1000 VIAL (EA) INTRAVENOUS ONCE
Refills: 0 | Status: DISCONTINUED | OUTPATIENT
Start: 2020-06-18 | End: 2020-07-03

## 2020-06-19 LAB — BKV DNA SPEC QL NAA+PROBE: NOT DETECTED COPIES/ML

## 2020-07-02 DIAGNOSIS — Z46.6 ENCOUNTER FOR FITTING AND ADJUSTMENT OF URINARY DEVICE: ICD-10-CM

## 2020-07-02 DIAGNOSIS — N13.5 CROSSING VESSEL AND STRICTURE OF URETER WITHOUT HYDRONEPHROSIS: ICD-10-CM

## 2020-07-02 DIAGNOSIS — Z94.0 KIDNEY TRANSPLANT STATUS: ICD-10-CM

## 2020-07-02 NOTE — PROGRESS NOTE ADULT - PROBLEM SELECTOR PLAN 2
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  Completed 24 hour urine collection for oxalate, calcium and creatinine, f/u result as outpt.  Continue Nystatin, Tacrolimus, Prednisone, Cellcept, Valcyte TIW   Continue Epclusa for HCV treatment.   Bactrim placed on hold. Continue Mepron  Strict I/Os  Daily BMP. Monitor K+  Continue Allopurinol for uric acid crystals, Flomax, Pepcid  FU daily Tacrolimus levels. Detail Level: Simple Introduction Text (Please End With A Colon): Reason for deferral: Follow up with  [facility] on the arms and hands Procedure To Be Performed At Next Visit: Acne Surgery

## 2020-07-05 ENCOUNTER — INPATIENT (INPATIENT)
Facility: HOSPITAL | Age: 70
LOS: 4 days | Discharge: HOME | DRG: 872 | End: 2020-07-10
Attending: TRANSPLANT SURGERY | Admitting: TRANSPLANT SURGERY
Payer: MEDICARE

## 2020-07-05 ENCOUNTER — TRANSCRIPTION ENCOUNTER (OUTPATIENT)
Age: 70
End: 2020-07-05

## 2020-07-05 VITALS
TEMPERATURE: 101 F | HEART RATE: 112 BPM | OXYGEN SATURATION: 97 % | WEIGHT: 210.1 LBS | HEIGHT: 70 IN | SYSTOLIC BLOOD PRESSURE: 108 MMHG | DIASTOLIC BLOOD PRESSURE: 60 MMHG | RESPIRATION RATE: 18 BRPM

## 2020-07-05 DIAGNOSIS — Z90.5 ACQUIRED ABSENCE OF KIDNEY: Chronic | ICD-10-CM

## 2020-07-05 DIAGNOSIS — Z94.0 KIDNEY TRANSPLANT STATUS: Chronic | ICD-10-CM

## 2020-07-05 DIAGNOSIS — Z98.890 OTHER SPECIFIED POSTPROCEDURAL STATES: Chronic | ICD-10-CM

## 2020-07-05 DIAGNOSIS — N17.9 ACUTE KIDNEY FAILURE, UNSPECIFIED: ICD-10-CM

## 2020-07-05 DIAGNOSIS — I77.0 ARTERIOVENOUS FISTULA, ACQUIRED: Chronic | ICD-10-CM

## 2020-07-05 LAB
ALBUMIN SERPL ELPH-MCNC: 3.8 G/DL — SIGNIFICANT CHANGE UP (ref 3.3–5)
ALP SERPL-CCNC: 57 U/L — SIGNIFICANT CHANGE UP (ref 40–120)
ALT FLD-CCNC: 25 U/L — SIGNIFICANT CHANGE UP (ref 10–45)
ANION GAP SERPL CALC-SCNC: 16 MMOL/L — SIGNIFICANT CHANGE UP (ref 5–17)
APPEARANCE UR: ABNORMAL
APTT BLD: 31.4 SEC — SIGNIFICANT CHANGE UP (ref 27.5–35.5)
AST SERPL-CCNC: 53 U/L — HIGH (ref 10–40)
BASOPHILS # BLD AUTO: 0.07 K/UL — SIGNIFICANT CHANGE UP (ref 0–0.2)
BASOPHILS NFR BLD AUTO: 0.9 % — SIGNIFICANT CHANGE UP (ref 0–2)
BILIRUB SERPL-MCNC: 0.8 MG/DL — SIGNIFICANT CHANGE UP (ref 0.2–1.2)
BILIRUB UR-MCNC: NEGATIVE — SIGNIFICANT CHANGE UP
BUN SERPL-MCNC: 57 MG/DL — HIGH (ref 7–23)
CALCIUM SERPL-MCNC: 9.8 MG/DL — SIGNIFICANT CHANGE UP (ref 8.4–10.5)
CHLORIDE SERPL-SCNC: 96 MMOL/L — SIGNIFICANT CHANGE UP (ref 96–108)
CO2 SERPL-SCNC: 17 MMOL/L — LOW (ref 22–31)
COLOR SPEC: YELLOW — SIGNIFICANT CHANGE UP
CREAT SERPL-MCNC: 6.11 MG/DL — HIGH (ref 0.5–1.3)
DIFF PNL FLD: ABNORMAL
EOSINOPHIL # BLD AUTO: 0 K/UL — SIGNIFICANT CHANGE UP (ref 0–0.5)
EOSINOPHIL NFR BLD AUTO: 0 % — SIGNIFICANT CHANGE UP (ref 0–6)
GAS PNL BLDV: SIGNIFICANT CHANGE UP
GLUCOSE SERPL-MCNC: 115 MG/DL — HIGH (ref 70–99)
GLUCOSE UR QL: NEGATIVE — SIGNIFICANT CHANGE UP
HCT VFR BLD CALC: 30.2 % — LOW (ref 39–50)
HGB BLD-MCNC: 9.8 G/DL — LOW (ref 13–17)
INR BLD: 1.3 RATIO — HIGH (ref 0.88–1.16)
KETONES UR-MCNC: NEGATIVE — SIGNIFICANT CHANGE UP
LEUKOCYTE ESTERASE UR-ACNC: ABNORMAL
LYMPHOCYTES # BLD AUTO: 1.38 K/UL — SIGNIFICANT CHANGE UP (ref 1–3.3)
LYMPHOCYTES # BLD AUTO: 19.1 % — SIGNIFICANT CHANGE UP (ref 13–44)
MCHC RBC-ENTMCNC: 31.2 PG — SIGNIFICANT CHANGE UP (ref 27–34)
MCHC RBC-ENTMCNC: 32.5 GM/DL — SIGNIFICANT CHANGE UP (ref 32–36)
MCV RBC AUTO: 96.2 FL — SIGNIFICANT CHANGE UP (ref 80–100)
MONOCYTES # BLD AUTO: 0.44 K/UL — SIGNIFICANT CHANGE UP (ref 0–0.9)
MONOCYTES NFR BLD AUTO: 6.1 % — SIGNIFICANT CHANGE UP (ref 2–14)
NEUTROPHILS # BLD AUTO: 5.35 K/UL — SIGNIFICANT CHANGE UP (ref 1.8–7.4)
NEUTROPHILS NFR BLD AUTO: 72.2 % — SIGNIFICANT CHANGE UP (ref 43–77)
NITRITE UR-MCNC: NEGATIVE — SIGNIFICANT CHANGE UP
PH UR: 6 — SIGNIFICANT CHANGE UP (ref 5–8)
PLATELET # BLD AUTO: 108 K/UL — LOW (ref 150–400)
POTASSIUM SERPL-MCNC: 4 MMOL/L — SIGNIFICANT CHANGE UP (ref 3.5–5.3)
POTASSIUM SERPL-SCNC: 4 MMOL/L — SIGNIFICANT CHANGE UP (ref 3.5–5.3)
PROT SERPL-MCNC: 7.2 G/DL — SIGNIFICANT CHANGE UP (ref 6–8.3)
PROT UR-MCNC: ABNORMAL
PROTHROM AB SERPL-ACNC: 14.8 SEC — HIGH (ref 10.6–13.6)
RBC # BLD: 3.14 M/UL — LOW (ref 4.2–5.8)
RBC # FLD: 14.1 % — SIGNIFICANT CHANGE UP (ref 10.3–14.5)
SARS-COV-2 RNA SPEC QL NAA+PROBE: SIGNIFICANT CHANGE UP
SODIUM SERPL-SCNC: 129 MMOL/L — LOW (ref 135–145)
SP GR SPEC: 1.02 — SIGNIFICANT CHANGE UP (ref 1.01–1.02)
UROBILINOGEN FLD QL: NEGATIVE — SIGNIFICANT CHANGE UP
WBC # BLD: 7.24 K/UL — SIGNIFICANT CHANGE UP (ref 3.8–10.5)
WBC # FLD AUTO: 7.24 K/UL — SIGNIFICANT CHANGE UP (ref 3.8–10.5)

## 2020-07-05 PROCEDURE — 93010 ELECTROCARDIOGRAM REPORT: CPT | Mod: GC

## 2020-07-05 PROCEDURE — 99285 EMERGENCY DEPT VISIT HI MDM: CPT | Mod: CS,GC

## 2020-07-05 PROCEDURE — 71045 X-RAY EXAM CHEST 1 VIEW: CPT | Mod: 26

## 2020-07-05 PROCEDURE — 76770 US EXAM ABDO BACK WALL COMP: CPT | Mod: 26

## 2020-07-05 PROCEDURE — 74176 CT ABD & PELVIS W/O CONTRAST: CPT | Mod: 26

## 2020-07-05 RX ORDER — FAMOTIDINE 10 MG/ML
20 INJECTION INTRAVENOUS DAILY
Refills: 0 | Status: DISCONTINUED | OUTPATIENT
Start: 2020-07-05 | End: 2020-07-10

## 2020-07-05 RX ORDER — VANCOMYCIN HCL 1 G
750 VIAL (EA) INTRAVENOUS ONCE
Refills: 0 | Status: COMPLETED | OUTPATIENT
Start: 2020-07-05 | End: 2020-07-05

## 2020-07-05 RX ORDER — GABAPENTIN 400 MG/1
300 CAPSULE ORAL DAILY
Refills: 0 | Status: DISCONTINUED | OUTPATIENT
Start: 2020-07-05 | End: 2020-07-10

## 2020-07-05 RX ORDER — DEXTROSE 50 % IN WATER 50 %
25 SYRINGE (ML) INTRAVENOUS ONCE
Refills: 0 | Status: DISCONTINUED | OUTPATIENT
Start: 2020-07-05 | End: 2020-07-10

## 2020-07-05 RX ORDER — SODIUM CHLORIDE 9 MG/ML
1000 INJECTION INTRAMUSCULAR; INTRAVENOUS; SUBCUTANEOUS ONCE
Refills: 0 | Status: COMPLETED | OUTPATIENT
Start: 2020-07-05 | End: 2020-07-05

## 2020-07-05 RX ORDER — GLUCAGON INJECTION, SOLUTION 0.5 MG/.1ML
1 INJECTION, SOLUTION SUBCUTANEOUS ONCE
Refills: 0 | Status: DISCONTINUED | OUTPATIENT
Start: 2020-07-05 | End: 2020-07-10

## 2020-07-05 RX ORDER — SODIUM CHLORIDE 9 MG/ML
1000 INJECTION, SOLUTION INTRAVENOUS
Refills: 0 | Status: DISCONTINUED | OUTPATIENT
Start: 2020-07-05 | End: 2020-07-10

## 2020-07-05 RX ORDER — CEFEPIME 1 G/1
2000 INJECTION, POWDER, FOR SOLUTION INTRAMUSCULAR; INTRAVENOUS ONCE
Refills: 0 | Status: COMPLETED | OUTPATIENT
Start: 2020-07-05 | End: 2020-07-05

## 2020-07-05 RX ORDER — ALLOPURINOL 300 MG
100 TABLET ORAL DAILY
Refills: 0 | Status: DISCONTINUED | OUTPATIENT
Start: 2020-07-05 | End: 2020-07-10

## 2020-07-05 RX ORDER — TACROLIMUS 5 MG/1
4 CAPSULE ORAL
Refills: 0 | Status: DISCONTINUED | OUTPATIENT
Start: 2020-07-06 | End: 2020-07-07

## 2020-07-05 RX ORDER — INSULIN LISPRO 100/ML
VIAL (ML) SUBCUTANEOUS
Refills: 0 | Status: DISCONTINUED | OUTPATIENT
Start: 2020-07-05 | End: 2020-07-08

## 2020-07-05 RX ORDER — CEFEPIME 1 G/1
1000 INJECTION, POWDER, FOR SOLUTION INTRAMUSCULAR; INTRAVENOUS EVERY 8 HOURS
Refills: 0 | Status: DISCONTINUED | OUTPATIENT
Start: 2020-07-05 | End: 2020-07-05

## 2020-07-05 RX ORDER — CEFEPIME 1 G/1
1000 INJECTION, POWDER, FOR SOLUTION INTRAMUSCULAR; INTRAVENOUS EVERY 12 HOURS
Refills: 0 | Status: DISCONTINUED | OUTPATIENT
Start: 2020-07-05 | End: 2020-07-10

## 2020-07-05 RX ORDER — TAMSULOSIN HYDROCHLORIDE 0.4 MG/1
0.4 CAPSULE ORAL AT BEDTIME
Refills: 0 | Status: DISCONTINUED | OUTPATIENT
Start: 2020-07-05 | End: 2020-07-10

## 2020-07-05 RX ORDER — ACETAMINOPHEN 500 MG
650 TABLET ORAL ONCE
Refills: 0 | Status: COMPLETED | OUTPATIENT
Start: 2020-07-05 | End: 2020-07-05

## 2020-07-05 RX ORDER — DEXTROSE 50 % IN WATER 50 %
12.5 SYRINGE (ML) INTRAVENOUS ONCE
Refills: 0 | Status: DISCONTINUED | OUTPATIENT
Start: 2020-07-05 | End: 2020-07-10

## 2020-07-05 RX ORDER — DEXTROSE 50 % IN WATER 50 %
15 SYRINGE (ML) INTRAVENOUS ONCE
Refills: 0 | Status: DISCONTINUED | OUTPATIENT
Start: 2020-07-05 | End: 2020-07-10

## 2020-07-05 RX ORDER — ACETAMINOPHEN 500 MG
325 TABLET ORAL ONCE
Refills: 0 | Status: COMPLETED | OUTPATIENT
Start: 2020-07-05 | End: 2020-07-05

## 2020-07-05 RX ADMIN — Medication 250 MILLIGRAM(S): at 19:54

## 2020-07-05 RX ADMIN — Medication 325 MILLIGRAM(S): at 13:44

## 2020-07-05 RX ADMIN — SODIUM CHLORIDE 1000 MILLILITER(S): 9 INJECTION INTRAMUSCULAR; INTRAVENOUS; SUBCUTANEOUS at 15:10

## 2020-07-05 RX ADMIN — TAMSULOSIN HYDROCHLORIDE 0.4 MILLIGRAM(S): 0.4 CAPSULE ORAL at 21:58

## 2020-07-05 RX ADMIN — SODIUM CHLORIDE 1000 MILLILITER(S): 9 INJECTION INTRAMUSCULAR; INTRAVENOUS; SUBCUTANEOUS at 13:45

## 2020-07-05 RX ADMIN — Medication 650 MILLIGRAM(S): at 17:54

## 2020-07-05 RX ADMIN — CEFEPIME 100 MILLIGRAM(S): 1 INJECTION, POWDER, FOR SOLUTION INTRAMUSCULAR; INTRAVENOUS at 15:10

## 2020-07-05 NOTE — ED CLERICAL - NS ED CLERK NOTE PRE-ARRIVAL INFORMATION; ADDITIONAL PRE-ARRIVAL INFORMATION
CC/Reason For referral: S/P RENAL TX 2017.  FEVER, WEAKNESS- R/O SEPSIS  Preferred Consultant(if applicable):  Who admits for you (if needed):  Do you have documents you would like to fax over?  Would you still like to speak to an ED attending?  PLEASE CALL AFTER PATIENT IS SEEN

## 2020-07-05 NOTE — ED PROVIDER NOTE - PHYSICAL EXAMINATION
Gen: NAD, AOx3, able to make needs known, non-toxic  Head: NCAT  HEENT: EOMI, oral mucosa moist, normal conjunctiva  Lung: CTAB, no respiratory distress, no wheezes/rhonchi/rales B/L, speaking in full sentences  CV: Tachycardic, no murmurs  Abd: soft, NTND, no guarding, R sided nephrostomy tube in place not collected to external drainage bag  MSK: no visible deformities. L lower arm w/ AV fistula  Neuro: No focal sensory or motor deficits  Skin: Warm, well perfused  Psych: normal affect

## 2020-07-05 NOTE — ED PROVIDER NOTE - PROGRESS NOTE DETAILS
Dr. Valerio Mattson, PGY-3: creatinine over 6. Awaiting call back from transplant Dr. Valerio Mattson, PGY-3: seen by transplant team. Pt TBA transplant, awaiting accepting physician name

## 2020-07-05 NOTE — ED PROVIDER NOTE - NS ED ROS FT
GENERAL: +fever  EYES: No change in vision  HEENT: No trouble swallowing or speaking  CARDIAC: No chest pain  PULMONARY: No cough or SOB  GI: No abdominal pain, no nausea or no vomiting, no diarrhea or constipation  : per HPI  SKIN: No rashes  NEURO: No headache, no numbness  MSK: No joint pain  Otherwise as HPI or negative.

## 2020-07-05 NOTE — ED ADULT NURSE NOTE - OBJECTIVE STATEMENT
69 year old male A&OX4 with a past medical hx of ESRD on hemodialysis, Hep C, HTN, Kidney CA, Kidney transplant, with right nephrostomy tube, presents with three days of worsening weakness and fevers. Recently, approximately ten days ago, patient was being treated for a urinary tract infection with Bactrim. Patient states that he was feeling better up until three days ago when he began to feel more fatigued. Patient has been medicating with acetaminophen for fever. Patient reports a decrease in urine output as well. Denies shortness of breath, cough, chest pain, dizziness, nausea, vomiting, palpitations. Patients skin is intact with no evidence of wounds. Left AV fistula present.

## 2020-07-05 NOTE — ED ADULT NURSE NOTE - CHIEF COMPLAINT QUOTE
tested negative for Covid x 3 weeks ago Has fever weakness Recently treated for UTI x 10 days ago HX Kidney transplant 2018

## 2020-07-05 NOTE — H&P ADULT - NSHPLABSRESULTS_GEN_ALL_CORE
9.8    7.24  )-----------( 108      ( 05 Jul 2020 13:36 )             30.2     07-05    129<L>  |  96  |  57<H>  ----------------------------<  115<H>  4.0   |  17<L>  |  6.11<H>    Ca    9.8      05 Jul 2020 13:36    TPro  7.2  /  Alb  3.8  /  TBili  0.8  /  DBili  x   /  AST  53<H>  /  ALT  25  /  AlkPhos  57  07-05

## 2020-07-05 NOTE — H&P ADULT - NSHPPHYSICALEXAM_GEN_ALL_CORE
Vital Signs Last 24 Hrs  T(C): 37.5 (05 Jul 2020 15:10), Max: 38.6 (05 Jul 2020 12:58)  T(F): 99.5 (05 Jul 2020 15:10), Max: 101.5 (05 Jul 2020 12:58)  HR: 85 (05 Jul 2020 15:10) (85 - 112)  BP: 107/56 (05 Jul 2020 15:10) (107/56 - 108/60)  BP(mean): --  RR: 18 (05 Jul 2020 15:10) (18 - 18)  SpO2: 98% (05 Jul 2020 15:10) (97% - 98%)      Constitutional: Well developed / well nourished  Eyes: Anicteric, PERRLA  ENMT: nc/at  Neck: supple  Respiratory: CTA B/L  Cardiovascular: RRR  Gastrointestinal: Soft, non distended, non tender over graft (RLQ); PCN capped, site c/d/i  Genitourinary: Voiding spontaneously  Extremities: no edema, L AVF..  Vascular: Palpable dp pulses bilaterally  Neurological: A&O x3  Skin: no rashes, ulcerations or lesions;  Musculoskeletal: Moving all extremities  Psychiatric: Responsive

## 2020-07-05 NOTE — H&P ADULT - HISTORY OF PRESENT ILLNESS
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 h/o CMV viremia.     Re-admitted in 2/2019 with E coli bacteremia/urosepsis; treated w/ IV vanc/cefepime/zosyn.   Re-admitted in 5/2019 with severe CHELA 2/2 FK toxicity (level on admission was 39), +UA, was treated w/ cefepime.   Re-admitted in 8/2019 w/ uptrending Cr on outpatient labs (3.4), was  treated with pulse steroid for ACR 1B.   Re-admitted in 2/2020 with CHELA and hydronephrosis, PCN placed 2/25 by IR, which was converted to PCNU on 2/27.     Pt presented to ED with fever and generalized weakness past 3 days. Denies dysuria, hematuria, has some frequency on urination. Recently tx for UTI with keflex, completed anbx a week ago. Urine cx on 6/15 coag neg staph, hx of E coli UTI in the past.  In ED: Febrile 38.6, , /60. Labs significant for elevated creat  ~6 (baseline ~2). Recently had ureteral stent placed on 6/18; PCN in place (capped). Non contrast CT a/p in ED non specific. CXR clear. Awaiting for renal doppler. Denies respiratory symptoms. Denies abdominal pain.     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 h/o CMV viremia.     Re-admitted in 2/2019 with E coli bacteremia/urosepsis; treated w/ IV vanc/cefepime/zosyn.   Re-admitted in 5/2019 with severe CHELA 2/2 FK toxicity (level on admission was 39), +UA, was treated w/ cefepime.   Re-admitted in 8/2019 w/ uptrending Cr on outpatient labs (3.4), was  treated with pulse steroid for ACR 1B.   Re-admitted in 2/2020 with CHELA and hydronephrosis, PCN placed 2/25 by IR, which was converted to PCNU on 2/27.     Pt presented to ED with fever and generalized weakness past 3 days. Denies dysuria, hematuria, has some frequency on urination. Recently tx for UTI with keflex, completed anbx a week ago. Urine cx on 6/15 coag neg staph, hx of E coli UTI in the past.  In ED: Febrile 38.6, , /60. Labs significant for elevated creat  ~6 (baseline ~2). Recently had ureteral stent placed on 6/18; PCN in place (capped). Non contrast CT a/p in ED non specific. CXR clear. Awaiting for renal doppler. Denies respiratory symptoms. Denies abdominal pain. Received Cefepime 2g in ED with NS bolus 2L    BK PCR neg on 6/15   CMV PCR detected 6/15

## 2020-07-05 NOTE — H&P ADULT - ASSESSMENT
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        [] S/P DDRT 7/2018, presents with CHELA and fever  - S/P recent IR PCN placement 2/25, PCN-U  (2/27), ureteral stent exchange 6/2018 (PCN capped)  - Admit to Transplant Surgery  - Blood cxs x 2/Urine cx   - Received cefepime in ED (prior to Urine sample collection)   - Continue Cefepime  - Renal doppler  - Open PCN to bag  - IR c/s in am   - Immuno: Envarsus 4mg daily, check level in am; Hold MMF (1g bid) in setting of fever; Pred 5mg daily   - Strict I/Os  - Regular diet  - Continue home meds  - check CMV PCR with am labs (detected on output lab 6/15)     [] DM  - Continue ISS monitor BG  - Confirm home medication    [] HTN  - BP in /60  - Hold BP meds   - On Nifedipine 30daily and Metoprolol at home 68 y/o/M with a PMH of 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        [] S/P DDRT 7/2018, presents with CHELA and fever  - S/P recent IR PCN placement 2/25, PCN-U  (2/27), ureteral stent exchange 6/2018 (PCN capped)  - Admit to Transplant Surgery  - Blood cxs x 2/Urine cx   - Received cefepime in ED  - Continue Cefepime  - Renal doppler  - Open PCN to bag  - IR c/s in am   - Immuno: Envarsus 4mg daily, check level in am; Hold MMF (1g bid) in setting of fever; Pred 5mg daily   - Strict I/Os  - Regular diet  - Continue home meds  - check CMV PCR with am labs (detected on output lab 6/15)     [] DM  - Continue ISS monitor BG  - Confirm home medication    [] HTN  - BP in /60  - Hold BP meds   - On Nifedipine 30daily and Metoprolol at home

## 2020-07-05 NOTE — ED PROVIDER NOTE - PSH
AV fistula  2013/ left forearm  H/O ileostomy  '    for 3 months. s/p Reversal  Kidney transplant recipient  2018  @ [facility] :  +  Hep C Donor  S/p nephrectomy  right 12/10/2015   benign  S/p nephrectomy  left 9/15/2015   + Cancer  S/P right knee arthroscopy

## 2020-07-05 NOTE — ED ADULT NURSE REASSESSMENT NOTE - NS ED NURSE REASSESS COMMENT FT1
Patient found to be rigoring prior to transport. Patient endorses chills. MD notified. Acetaminophen administered. Patient transported to [address] [place] [address].

## 2020-07-05 NOTE — ED PROVIDER NOTE - ATTENDING CONTRIBUTION TO CARE
69M presenting for evaluation of fevers and lethargy for the past 3-4 days, on exam well appearing, right lower quadrant nephrostomy well appearing, given 10 days antibiotic course that has been recently performed for UTI but now recurrent symptoms suspect probable recurrent infection, considerations include abscess vs infected drain, also possible that it is a recurrent simple infection. Will work up as a sepsis, antibiosis with cefepime, admit.

## 2020-07-05 NOTE — ED PROVIDER NOTE - CLINICAL SUMMARY MEDICAL DECISION MAKING FREE TEXT BOX
68 y/o M w/ PMH of BPH, b/l nephrectomy, kidney transplant (7/18) on ebastine and cellcept, HTN, DM2, prior HD presenting w/ fever. Pt well appearing on exam. Febrile to 101.5F and tachycardic. Suspicious for  source. Will do ED sepsis work up. Per chart review, pt had hx of prior perinephric collection. Will get CT a/p to eval for possible abdominal source of fever. Plan for labs, CT a/p, tylenol, IVF, covid swab. Pt may require admission. Will reassess the need for additional interventions as clinically warranted. 70 y/o M w/ PMH of BPH, b/l nephrectomy, kidney transplant (7/18) on ebastine and cellcept, HTN, DM2, prior HD presenting w/ fever. Pt well appearing on exam. Febrile to 101.5F and tachycardic. Suspicious for  source. Will do ED sepsis work up. Per chart review, pt had hx of prior perinephric collection. Will get CT a/p to eval for possible abdominal source of fever. Plan for labs, CT a/p, tylenol, abx, IVF, covid swab. Pt may require admission. Will reassess the need for additional interventions as clinically warranted.

## 2020-07-05 NOTE — ED ADULT TRIAGE NOTE - CHIEF COMPLAINT QUOTE
tested Covid x 3 weeks ago Has fever weakness Recently treated for UTI HX Kidney transplant 2018 tested negative for Covid x 3 weeks ago Has fever weakness Recently treated for UTI x 10 days ago HX Kidney transplant 2018

## 2020-07-05 NOTE — H&P ADULT - NSICDXPASTSURGICALHX_GEN_ALL_CORE_FT
PAST SURGICAL HISTORY:  AV fistula 2013/ left forearm    H/O ileostomy 2017   for 3 months. s/p Reversal    Kidney transplant recipient 2018  @ [facility] :  +  Hep C Donor    S/p nephrectomy left 9/15/2015   + Cancer    S/p nephrectomy right 12/10/2015   benign    S/P right knee arthroscopy

## 2020-07-05 NOTE — ED PROVIDER NOTE - OBJECTIVE STATEMENT
68 y/o M w/ PMH of BPH, b/l nephrectomy, kidney transplant (7/18) on ebastine and cellcept, HTN, DM2, prior HD presenting w/ fever. [place] room 12. Reports for the past 3 days feeling increased fatigue and weakness. Yesterday felt feverish and his temp at home was 100F. This morning, still feeling unwell and took 650 mg of Tylenol at 11am. Went to urgent care who referred him to ED. 1 week ago pt completed 10 day course of keflex for UTI. Still experiencing increased frequency in urination, but no dysuria or hematuria. Approx 3 weeks ago had R nephrostomy tube procedure. Pt has diarrhea but reports that is chronic. Denies headache, dizziness, blurred vision, chest pain, cough, shortness of breath, abdominal pain, n/v/c, MSK pain, rash.     Transplant: Dr. Wilks  Nephrologist: Dr. Bhakta

## 2020-07-05 NOTE — H&P ADULT - NSHPREVIEWOFSYSTEMS_GEN_ALL_CORE
Gen: + fever, fatigue, weakness   Skin: No rashes  Head/Eyes/Ears/Mouth: No headache; Normal hearing; Normal vision w/o blurriness; No sinus pain/discomfort, sore throat  Respiratory: No dyspnea, cough, wheezing, hemoptysis  CV: No chest pain, PND, orthopnea  GI: no abdominal pain  : + increased frequency, denies dysuria, hematuria, nocturia  MSK: No joint pain/swelling; no back pain; no edema  Neuro: No dizziness/lightheadedness, weakness, seizures, numbness, tingling  Heme: No easy bruising or bleeding  Endo: No heat/cold intolerance  Psych: No significant nervousness, anxiety, stress, depression  All other systems were reviewed and are negative, except as noted.

## 2020-07-06 LAB
ALBUMIN SERPL ELPH-MCNC: 3.2 G/DL — LOW (ref 3.3–5)
ALP SERPL-CCNC: 55 U/L — SIGNIFICANT CHANGE UP (ref 40–120)
ALT FLD-CCNC: 26 U/L — SIGNIFICANT CHANGE UP (ref 10–45)
ANION GAP SERPL CALC-SCNC: 13 MMOL/L — SIGNIFICANT CHANGE UP (ref 5–17)
ANION GAP SERPL CALC-SCNC: 17 MMOL/L — SIGNIFICANT CHANGE UP (ref 5–17)
AST SERPL-CCNC: 44 U/L — HIGH (ref 10–40)
BILIRUB SERPL-MCNC: 0.7 MG/DL — SIGNIFICANT CHANGE UP (ref 0.2–1.2)
BUN SERPL-MCNC: 60 MG/DL — HIGH (ref 7–23)
BUN SERPL-MCNC: 61 MG/DL — HIGH (ref 7–23)
CALCIUM SERPL-MCNC: 8.8 MG/DL — SIGNIFICANT CHANGE UP (ref 8.4–10.5)
CALCIUM SERPL-MCNC: 8.9 MG/DL — SIGNIFICANT CHANGE UP (ref 8.4–10.5)
CHLORIDE SERPL-SCNC: 101 MMOL/L — SIGNIFICANT CHANGE UP (ref 96–108)
CHLORIDE SERPL-SCNC: 101 MMOL/L — SIGNIFICANT CHANGE UP (ref 96–108)
CO2 SERPL-SCNC: 14 MMOL/L — LOW (ref 22–31)
CO2 SERPL-SCNC: 18 MMOL/L — LOW (ref 22–31)
CREAT SERPL-MCNC: 5.45 MG/DL — HIGH (ref 0.5–1.3)
CREAT SERPL-MCNC: 6 MG/DL — HIGH (ref 0.5–1.3)
GLUCOSE BLDC GLUCOMTR-MCNC: 115 MG/DL — HIGH (ref 70–99)
GLUCOSE BLDC GLUCOMTR-MCNC: 117 MG/DL — HIGH (ref 70–99)
GLUCOSE BLDC GLUCOMTR-MCNC: 163 MG/DL — HIGH (ref 70–99)
GLUCOSE BLDC GLUCOMTR-MCNC: 183 MG/DL — HIGH (ref 70–99)
GLUCOSE BLDC GLUCOMTR-MCNC: 201 MG/DL — HIGH (ref 70–99)
GLUCOSE SERPL-MCNC: 123 MG/DL — HIGH (ref 70–99)
GLUCOSE SERPL-MCNC: 199 MG/DL — HIGH (ref 70–99)
HCT VFR BLD CALC: 27.7 % — LOW (ref 39–50)
HGB BLD-MCNC: 8.8 G/DL — LOW (ref 13–17)
MCHC RBC-ENTMCNC: 30.9 PG — SIGNIFICANT CHANGE UP (ref 27–34)
MCHC RBC-ENTMCNC: 31.8 GM/DL — LOW (ref 32–36)
MCV RBC AUTO: 97.2 FL — SIGNIFICANT CHANGE UP (ref 80–100)
NRBC # BLD: 0 /100 WBCS — SIGNIFICANT CHANGE UP (ref 0–0)
PHOSPHATE SERPL-MCNC: 4.2 MG/DL — SIGNIFICANT CHANGE UP (ref 2.5–4.5)
PLATELET # BLD AUTO: 96 K/UL — LOW (ref 150–400)
POTASSIUM SERPL-MCNC: 4.5 MMOL/L — SIGNIFICANT CHANGE UP (ref 3.5–5.3)
POTASSIUM SERPL-MCNC: 4.8 MMOL/L — SIGNIFICANT CHANGE UP (ref 3.5–5.3)
POTASSIUM SERPL-SCNC: 4.5 MMOL/L — SIGNIFICANT CHANGE UP (ref 3.5–5.3)
POTASSIUM SERPL-SCNC: 4.8 MMOL/L — SIGNIFICANT CHANGE UP (ref 3.5–5.3)
PROT SERPL-MCNC: 6.3 G/DL — SIGNIFICANT CHANGE UP (ref 6–8.3)
RBC # BLD: 2.85 M/UL — LOW (ref 4.2–5.8)
RBC # FLD: 13.9 % — SIGNIFICANT CHANGE UP (ref 10.3–14.5)
SODIUM SERPL-SCNC: 132 MMOL/L — LOW (ref 135–145)
SODIUM SERPL-SCNC: 132 MMOL/L — LOW (ref 135–145)
TACROLIMUS SERPL-MCNC: 6.7 NG/ML — SIGNIFICANT CHANGE UP
WBC # BLD: 4.55 K/UL — SIGNIFICANT CHANGE UP (ref 3.8–10.5)
WBC # FLD AUTO: 4.55 K/UL — SIGNIFICANT CHANGE UP (ref 3.8–10.5)

## 2020-07-06 PROCEDURE — 99222 1ST HOSP IP/OBS MODERATE 55: CPT

## 2020-07-06 PROCEDURE — 99232 SBSQ HOSP IP/OBS MODERATE 35: CPT | Mod: GC

## 2020-07-06 RX ORDER — CITRIC ACID/SODIUM CITRATE 300-500 MG
15 SOLUTION, ORAL ORAL THREE TIMES A DAY
Refills: 0 | Status: DISCONTINUED | OUTPATIENT
Start: 2020-07-06 | End: 2020-07-10

## 2020-07-06 RX ORDER — SODIUM CHLORIDE 9 MG/ML
1000 INJECTION INTRAMUSCULAR; INTRAVENOUS; SUBCUTANEOUS
Refills: 0 | Status: DISCONTINUED | OUTPATIENT
Start: 2020-07-06 | End: 2020-07-08

## 2020-07-06 RX ORDER — ACETAMINOPHEN 500 MG
650 TABLET ORAL ONCE
Refills: 0 | Status: COMPLETED | OUTPATIENT
Start: 2020-07-06 | End: 2020-07-06

## 2020-07-06 RX ADMIN — FAMOTIDINE 20 MILLIGRAM(S): 10 INJECTION INTRAVENOUS at 11:31

## 2020-07-06 RX ADMIN — Medication 100 MILLIGRAM(S): at 11:31

## 2020-07-06 RX ADMIN — Medication 15 MILLILITER(S): at 15:03

## 2020-07-06 RX ADMIN — CEFEPIME 100 MILLIGRAM(S): 1 INJECTION, POWDER, FOR SOLUTION INTRAMUSCULAR; INTRAVENOUS at 17:03

## 2020-07-06 RX ADMIN — Medication 1: at 12:34

## 2020-07-06 RX ADMIN — Medication 1 TABLET(S): at 11:31

## 2020-07-06 RX ADMIN — CEFEPIME 100 MILLIGRAM(S): 1 INJECTION, POWDER, FOR SOLUTION INTRAMUSCULAR; INTRAVENOUS at 05:16

## 2020-07-06 RX ADMIN — SODIUM CHLORIDE 70 MILLILITER(S): 9 INJECTION INTRAMUSCULAR; INTRAVENOUS; SUBCUTANEOUS at 09:55

## 2020-07-06 RX ADMIN — GABAPENTIN 300 MILLIGRAM(S): 400 CAPSULE ORAL at 11:31

## 2020-07-06 RX ADMIN — TAMSULOSIN HYDROCHLORIDE 0.4 MILLIGRAM(S): 0.4 CAPSULE ORAL at 21:18

## 2020-07-06 RX ADMIN — Medication 1: at 18:35

## 2020-07-06 RX ADMIN — Medication 15 MILLILITER(S): at 21:18

## 2020-07-06 RX ADMIN — Medication 650 MILLIGRAM(S): at 20:15

## 2020-07-06 RX ADMIN — TACROLIMUS 4 MILLIGRAM(S): 5 CAPSULE ORAL at 08:40

## 2020-07-06 RX ADMIN — Medication 5 MILLIGRAM(S): at 05:16

## 2020-07-06 NOTE — PROGRESS NOTE ADULT - SUBJECTIVE AND OBJECTIVE BOX
Transplant Surgery - Multidisciplinary Rounds  --------------------------------------------------------------  DDRT HCV+    Date: 2018       Admitted 2020 with weakness and fever x 3 days    Present:   Patient seen with multidisciplinary team including ( Transplant Surgeon: Dr. Yao. Transplant Nephrologist: Dr. Bhakta.  Pharmacist: Abhishek Harrell. NP: Roxann Jorgensen during am rounds and examined with .  Disciplines not in attendance will be notified of the plan.     HPI: 70 y/o/M with a PMH of ESRD 2/2 bilateral nephrectomy from neoplasm, hypertension, and NIDDM.  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 h/o CMV viremia.     Re-admitted in 2019 with E coli bacteremia/urosepsis; treated w/ IV vanc/cefepime/zosyn.   Re-admitted in 2019 with severe CHELA 2/2 FK toxicity (level on admission was 39), +UA, was treated w/ cefepime.   Re-admitted in 2019 w/ uptrending Cr on outpatient labs (3.4), was  treated with pulse steroid for ACR 1B.   Re-admitted in 2020 with CHELA and hydronephrosis, PCN placed  by IR, which was converted to PCNU on .     Presented to ED with fever and generalized weakness past 3 days. Denies dysuria, hematuria, has some frequency on urination. Recently tx for UTI with keflex, completed abx a week ago. Urine cx on 6/15 coag neg staph, hx of E coli UTI in the past.  In ED: Febrile 38.6, , /60. Labs significant for elevated creat  ~6 (baseline ~2). Recently had ureteral stent placed on ; PCN in place (capped). Non contrast CT a/p in ED non specific. CXR clear. Awaiting for renal doppler. Denies respiratory symptoms. Denies abdominal pain. Received Cefepime 2g in ED with NS bolus 2L.     BK PCR neg on 6/15   CMV PCR detected 6/15    Interval Events:    - Tachycardic and febrile, + rigors on arrival to [facility].   Vancomycin 750mg IV x1 given  - TMax 38.6   Cefepime continued   Ua + Leuk Est, few bacteria.  UCx and BCx sent and pending  - MMF held  - PCNU opened to drain.  UOP 675ml.  No voids since drain opened  - Creatinine elevated 6.11 -> 6.0    - Renal graft usg: no hydro  - BP meds held for SBP ~ 100-120      Potential Discharge date: pending clinical improvement    Education:  Medications    Plan of care:  See Below    MEDICATIONS  (STANDING):  allopurinol 100 milliGRAM(s) Oral daily  cefepime   IVPB 1000 milliGRAM(s) IV Intermittent every 12 hours  citric acid/sodium citrate Solution 15 milliLiter(s) Oral three times a day  dextrose 5%. 1000 milliLiter(s) (50 mL/Hr) IV Continuous <Continuous>  dextrose 50% Injectable 12.5 Gram(s) IV Push once  dextrose 50% Injectable 25 Gram(s) IV Push once  dextrose 50% Injectable 25 Gram(s) IV Push once  famotidine    Tablet 20 milliGRAM(s) Oral daily  gabapentin 300 milliGRAM(s) Oral daily  insulin lispro (HumaLOG) corrective regimen sliding scale   SubCutaneous three times a day before meals  predniSONE   Tablet 5 milliGRAM(s) Oral daily  sodium chloride 0.9%. 1000 milliLiter(s) (70 mL/Hr) IV Continuous <Continuous>  tacrolimus ER Tablet (ENVARSUS XR) 4 milliGRAM(s) Oral <User Schedule>  tamsulosin 0.4 milliGRAM(s) Oral at bedtime  trimethoprim   80 mG/sulfamethoxazole 400 mG 1 Tablet(s) Oral daily    MEDICATIONS  (PRN):  dextrose 40% Gel 15 Gram(s) Oral once PRN Blood Glucose LESS THAN 70 milliGRAM(s)/deciliter  glucagon  Injectable 1 milliGRAM(s) IntraMuscular once PRN Glucose LESS THAN 70 milligrams/deciliter      PAST MEDICAL & SURGICAL HISTORY:  Medial meniscus tear: &#x27; 90&#x27;s  Right  Bowel obstruction: &#x27; 2017   surgically treated  Anal fissure: dx: &#x27; 2018   No surgery  Benign prostatic hypertrophy  Hepatitis C: In Donor Kidney: patient received treatment for Hep. 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      Vital Signs Last 24 Hrs  T(C): 37.2 (2020 09:00), Max: 38.6 (2020 12:58)  T(F): 98.9 (2020 09:00), Max: 101.5 (2020 12:58)  HR: 96 (2020 09:00) (85 - 112)  BP: 122/68 (2020 09:00) (107/56 - 151/73)  BP(mean): --  RR: 18 (2020 09:00) (18 - 18)  SpO2: 100% (2020 09:00) (97% - 100%)    I&O's Summary    2020 07:01  -  2020 07:00  --------------------------------------------------------  IN: 570 mL / OUT: 825 mL / NET: -255 mL                              8.8    4.55  )-----------( 96       ( 2020 06:23 )             27.7     07-06    132<L>  |  101  |  60<H>  ----------------------------<  123<H>  4.5   |  18<L>  |  6.00<H>    Ca    8.9      2020 06:21  Phos  4.2     07-06    TPro  6.3  /  Alb  3.2<L>  /  TBili  0.7  /  DBili  x   /  AST  44<H>  /  ALT  26  /  AlkPhos  55  07-    Tacrolimus (), Serum: 6.7 ng/mL ( @ 08:22)        EXAM:  CT ABDOMEN AND PELVIS       PROCEDURE DATE:  2020    INTERPRETATION:  CLINICAL INFORMATION: Fever, on tacrolimus and CellCept; prior perinephric abscess.  COMPARISON: CT chest, abdomen, and pelvis 2019.  PROCEDURE:   CT of the Abdomen and Pelvis was performed without intravenous contrast.   Intravenous contrast: None.  Oral contrast: None.  Sagittal and coronal reformats were performed.    FINDINGS:  Limited evaluation of visceral organs secondary to lack of intravenous contrast.    LOWER CHEST: Coronary calcifications/CABG. Trace left lingula linear type atelectasis.    LIVER: Within normal limits.  BILE DUCTS: Normal caliber.  GALLBLADDER: Cholelithiasis. Contracted.  SPLEEN: Within normal limits.  PANCREAS: Within normal limits.  ADRENALS: Left adrenal thickening and nodularity, unchanged from 2019.  KIDNEYS/URETERS: Bilateral nephrectomy. Right lower quadrant transplant kidney with nephroureteral stent looped in the bladder. Similar perinephric stranding when compared to 2019 with decreased fluid collection.    BLADDER: Bladder wall thickening, may be secondary to underdistention.  REPRODUCTIVE ORGANS: Prostate within normal limits.    BOWEL: Periampullary duodenal diverticulum. Intact ileocolonic anastomosis. Diverticulosis without diverticulitis. No bowel obstruction. Appendix is not visualized.  PERITONEUM: No ascites.  VESSELS: Atherosclerotic changes.  RETROPERITONEUM/LYMPH NODES: No lymphadenopathy.    ABDOMINAL WALL: Small fat-containing ventral hernia.  BONES: Degenerative changes.    IMPRESSION:   Limited evaluation secondary to lack of intravenous contrast. Redemonstrated right lower quadrant renal transplant which is enlarged and has mild adjacent fat stranding, similar to 2019; right nephroureterostomy tube appears in place. No definitive drainable collections.    Redemonstrated mild bladder wall thickening, which may be secondary to underdistention versus chronic cystitis; recommend correlation with urinalysis.          EXAM:  US KIDNEYS AND BLADDER             PROCEDURE DATE:  2020    INTERPRETATION:  CLINICAL INFORMATION: Elevated creatinine. Right lower quadrant renal transplant.  COMPARISON: Renal Doppler dated 2020 and abdominal CT dated 2020.  TECHNIQUE: Sonography of the kidneys and bladder.   FINDINGS:  Transplant kidney is noted in the right lower quadrant. It measures 13.6 cm in length. It is normal in size and echogenicity without hydronephrosis. Internal and external nephrostomy tube is identified with its tip in the urinary bladder.  Urinary bladder: Within normal limits. Nephrostomy tube tip within the urinary bladder.  The native kidneys are not visualized.  IMPRESSION:   Nephrostomy tube in place.  No hydronephrosis.      Review of systems  Gen: No weight changes, fatigue, fevers/chills, + weakness  Skin: No rashes  Head/Eyes/Ears/Mouth: No headache; Normal hearing; Normal vision w/o blurriness; No sinus pain/discomfort, sore throat  Respiratory: No dyspnea, cough, wheezing, hemoptysis  CV: No chest pain, PND, orthopnea  GI: Denies abdominal pain, diarrhea, constipation, nausea, vomiting, melena, hematochezia  : No increased frequency, dysuria, hematuria, nocturia. PCNU open to drain  MSK: No joint pain/swelling; no back pain; no edema  Neuro: No dizziness/lightheadedness, weakness, seizures, numbness, tingling  Heme: No easy bruising or bleeding  Endo: No heat/cold intolerance  Psych: No significant nervousness, anxiety, stress, depression  All other systems were reviewed and are negative, except as noted.      PHYSICAL EXAM:  Constitutional: Well developed / well nourished  Eyes: Anicteric, PERRLA  ENMT: nc/at  Neck: supple  Respiratory: CTA B/L  Cardiovascular: RRR  Gastrointestinal: Soft abdomen, NT, ND  Genitourinary: PCNU open to drain, clear yellow urine  Extremities: SCD's in place and working bilaterally, No edema  Vascular: Palpable dp pulses bilaterally  Neurological: A&O x3  Skin: wound well healed  Musculoskeletal: Moving all extremities  Psychiatric: Responsive

## 2020-07-06 NOTE — PROGRESS NOTE ADULT - SUBJECTIVE AND OBJECTIVE BOX
[facility] DIVISION OF KIDNEY DISEASES AND HYPERTENSION -- INITIAL CONSULT NOTE  --------------------------------------------------------------------------------  HPI:  68 y/o/M with a PMH of ESRD 2/2 bilateral nephrectomy from neoplasm, hypertension, and DM2 s/p HCV + DDRT (2018) admitted with CHELA and fever.    Transplant course c/b DGF, ATN/mild rejection (2018 treated with steroids) and hydronephrosis in 2018 requiring PCN placement (distal ureteral stricture), perinephric collection (drained). h/o CMV viremia.     Re-admitted in 2019 with E coli bacteremia/urosepsis; treated w/ IV vanc/cefepime/zosyn.   Re-admitted in 2019 with severe CHELA 2/2 FK toxicity (level on admission was 39), +UA, was treated w/ cefepime.   Re-admitted in 2019 w/ uptrending Cr on outpatient labs (3.4), was  treated with pulse steroid for ACR 1B.   Re-admitted in 2020 with CHELA and hydronephrosis, PCN placed  by IR, which was converted to PCNU on .     In  pt was having mild dysuria and KAUFMAN symptoms.  Was given flomax and antibiotics which improved symptoms.  On  he had NU stent replaced and capped.  Was given vancomycin periop for low colony count coag neg staph in urine.    3 days ago he started feeling weak.  Before admission noted low fever.  Pt also reports more watery BM's than usual (has chronic diarrhea).  In hospital UA appears dirty.  Non contrast CT a/p in ED non specific. CXR clear.   Nephrostomy opened.      BK PCR neg on 6/15   CMV PCR detected 6/15      PAST HISTORY  --------------------------------------------------------------------------------  PAST MEDICAL & SURGICAL HISTORY:  Medial meniscus tear: &#x27; 90&#x27;s  Right  Bowel obstruction: &#x27; 2017   surgically treated  Anal fissure: dx: &#x27; 2018   No surgery  Benign prostatic hypertrophy  Hepatitis C: In Donor Kidney: patient received treatment for Hep. C : post treatment: patient  tested Negative for Hep C  Kidney neoplasm: dx: &#x27;  : Left      s/p bilateral Nephrectomy &#x27;   ESRD (end stage renal disease): On Dialysis &#x27;  to 2018  Type 2 diabetes mellitus: dx: &#x27;88  HTN (hypertension)  Kidney transplant recipient: 2018  @ [facility] :  +  Hep C Donor  S/P right knee arthroscopy: &#x27; 90&#x27;s  H/O ileostomy: &#x27; 2017   for 3 months. 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    FAMILY HISTORY:    PAST SOCIAL HISTORY:    ALLERGIES & MEDICATIONS  --------------------------------------------------------------------------------  Allergies    No Known Allergies    Intolerances      Standing Inpatient Medications  allopurinol 100 milliGRAM(s) Oral daily  cefepime   IVPB 1000 milliGRAM(s) IV Intermittent every 12 hours  citric acid/sodium citrate Solution 15 milliLiter(s) Oral three times a day  dextrose 5%. 1000 milliLiter(s) IV Continuous <Continuous>  dextrose 50% Injectable 12.5 Gram(s) IV Push once  dextrose 50% Injectable 25 Gram(s) IV Push once  dextrose 50% Injectable 25 Gram(s) IV Push once  famotidine    Tablet 20 milliGRAM(s) Oral daily  gabapentin 300 milliGRAM(s) Oral daily  insulin lispro (HumaLOG) corrective regimen sliding scale   SubCutaneous three times a day before meals  predniSONE   Tablet 5 milliGRAM(s) Oral daily  sodium chloride 0.9%. 1000 milliLiter(s) IV Continuous <Continuous>  tacrolimus ER Tablet (ENVARSUS XR) 4 milliGRAM(s) Oral <User Schedule>  tamsulosin 0.4 milliGRAM(s) Oral at bedtime  trimethoprim   80 mG/sulfamethoxazole 400 mG 1 Tablet(s) Oral daily    PRN Inpatient Medications  dextrose 40% Gel 15 Gram(s) Oral once PRN  glucagon  Injectable 1 milliGRAM(s) IntraMuscular once PRN      REVIEW OF SYSTEMS  --------------------------------------------------------------------------------  Gen: + weakness  Skin: No rashes  Head/Eyes/Ears/Mouth: No headache; Normal hearing; Normal vision w/o blurriness; No sinus pain/discomfort, sore throat  Respiratory: No dyspnea, cough, wheezing, hemoptysis  CV: No chest pain, PND, orthopnea  GI: + diarrhea  : NU drain      All other systems were reviewed and are negative, except as noted.    VITALS/PHYSICAL EXAM  --------------------------------------------------------------------------------  T(C): 37.2 (20 @ 13:25), Max: 38.1 (20 @ 18:11)  HR: 92 (20 @ 13:25) (85 - 109)  BP: 129/67 (20 @ 13:25) (107/56 - 151/73)  RR: 18 (20 @ 13:25) (18 - 18)  SpO2: 98% (20 @ 13:25) (98% - 100%)  Wt(kg): --  Height (cm): 177.8 (20 @ 12:58)  Weight (kg): 95.3 (20 @ 12:58)  BMI (kg/m2): 30.1 (20 @ 12:58)  BSA (m2): 2.13 (20 @ 12:58)      20 @ 07:01  -  20 @ 07:00  --------------------------------------------------------  IN: 570 mL / OUT: 825 mL / NET: -255 mL    20 @ 07:01  -  20 @ 14:37  --------------------------------------------------------  IN: 480 mL / OUT: 350 mL / NET: 130 mL      Physical Exam:  	Gen: NAD  	HEENT: PERRL, supple neck, clear oropharynx  	Pulm: CTA B/L  	CV: RRR, S1S2; no rub  	Back: No spinal or CVA tenderness; no sacral edema  	Abd: +BS, soft, nontender/nondistended                      Transplant: non tender  	: No suprapubic tenderness  	UE: Warm, FROM, no clubbing, intact strength; no edema; no asterixis  	LE: Warm, FROM, no clubbing, intact strength; no edema  	Neuro: No focal deficits, intact gait  	Psych: Normal affect and mood  	Skin: Warm, without rashes    LABS/STUDIES  --------------------------------------------------------------------------------              8.8    4.55  >-----------<  96       [20 @ 06:23]              27.7     132  |  101  |  60  ----------------------------<  123      [20 @ 06:21]  4.5   |  18  |  6.00        Ca     8.9     [20 @ 06:21]      Phos  4.2     [20 06:21]    TPro  6.3  /  Alb  3.2  /  TBili  0.7  /  DBili  x   /  AST  44  /  ALT  26  /  AlkPhos  55  [20 @ 06:21]    PT/INR: PT 14.8 , INR 1.30       [20 @ 13:36]  PTT: 31.4       [20 13:36]      Creatinine Trend:  SCr 6.00 [:21]  SCr 6.11 [ 13:36]    Urinalysis - [20 @ 16:13]      Color Yellow / Appearance Slightly Turbid / SG 1.023 / pH 6.0      Gluc Negative / Ketone Negative  / Bili Negative / Urobili Negative       Blood Small / Protein 100 mg/dL / Leuk Est Large / Nitrite Negative      RBC 2 / WBC 42 / Hyaline 8 / Gran  / Sq Epi  / Non Sq Epi 8 / Bacteria Few      HbA1c 6.0      [19 @ 23:48]    HBsAb <3.0      [19 @ 22:44]  HBsAg Nonreact      [19 @ 22:44]  HBcAb Nonreact      [19 @ 22:44]  HCV 0.07, Nonreact      [19 @ 22:44]  HIV Nonreact      [08-15-18 @ 14:37]      TacrolimusTacrolimus (), Serum: 6.7 ng/mL ( 08:22)    Cyclosporine  Sirolimus  Mycophenolate  BK PCR  CMV PCR  Parvo PCR  EBV PCR

## 2020-07-07 LAB
-  AMIKACIN: SIGNIFICANT CHANGE UP
-  AZTREONAM: SIGNIFICANT CHANGE UP
-  CEFEPIME: SIGNIFICANT CHANGE UP
-  CEFTAZIDIME: SIGNIFICANT CHANGE UP
-  CIPROFLOXACIN: SIGNIFICANT CHANGE UP
-  GENTAMICIN: SIGNIFICANT CHANGE UP
-  IMIPENEM: SIGNIFICANT CHANGE UP
-  LEVOFLOXACIN: SIGNIFICANT CHANGE UP
-  MEROPENEM: SIGNIFICANT CHANGE UP
-  PIPERACILLIN/TAZOBACTAM: SIGNIFICANT CHANGE UP
-  TOBRAMYCIN: SIGNIFICANT CHANGE UP
ALBUMIN SERPL ELPH-MCNC: 3 G/DL — LOW (ref 3.3–5)
ALP SERPL-CCNC: 67 U/L — SIGNIFICANT CHANGE UP (ref 40–120)
ALT FLD-CCNC: 28 U/L — SIGNIFICANT CHANGE UP (ref 10–45)
ANION GAP SERPL CALC-SCNC: 14 MMOL/L — SIGNIFICANT CHANGE UP (ref 5–17)
AST SERPL-CCNC: 38 U/L — SIGNIFICANT CHANGE UP (ref 10–40)
BILIRUB SERPL-MCNC: 0.4 MG/DL — SIGNIFICANT CHANGE UP (ref 0.2–1.2)
BUN SERPL-MCNC: 60 MG/DL — HIGH (ref 7–23)
CALCIUM SERPL-MCNC: 8.6 MG/DL — SIGNIFICANT CHANGE UP (ref 8.4–10.5)
CHLORIDE SERPL-SCNC: 105 MMOL/L — SIGNIFICANT CHANGE UP (ref 96–108)
CMV DNA CSF QL NAA+PROBE: SIGNIFICANT CHANGE UP
CMV DNA SPEC NAA+PROBE-LOG#: SIGNIFICANT CHANGE UP LOG10IU/ML
CO2 SERPL-SCNC: 16 MMOL/L — LOW (ref 22–31)
CREAT SERPL-MCNC: 5.05 MG/DL — HIGH (ref 0.5–1.3)
CULTURE RESULTS: SIGNIFICANT CHANGE UP
GLUCOSE BLDC GLUCOMTR-MCNC: 175 MG/DL — HIGH (ref 70–99)
GLUCOSE BLDC GLUCOMTR-MCNC: 267 MG/DL — HIGH (ref 70–99)
GLUCOSE BLDC GLUCOMTR-MCNC: 277 MG/DL — HIGH (ref 70–99)
GLUCOSE BLDC GLUCOMTR-MCNC: 291 MG/DL — HIGH (ref 70–99)
GLUCOSE SERPL-MCNC: 140 MG/DL — HIGH (ref 70–99)
GRAM STN FLD: SIGNIFICANT CHANGE UP
HCT VFR BLD CALC: 26.9 % — LOW (ref 39–50)
HGB BLD-MCNC: 8.7 G/DL — LOW (ref 13–17)
MAGNESIUM SERPL-MCNC: 1.6 MG/DL — SIGNIFICANT CHANGE UP (ref 1.6–2.6)
MCHC RBC-ENTMCNC: 31.4 PG — SIGNIFICANT CHANGE UP (ref 27–34)
MCHC RBC-ENTMCNC: 32.3 GM/DL — SIGNIFICANT CHANGE UP (ref 32–36)
MCV RBC AUTO: 97.1 FL — SIGNIFICANT CHANGE UP (ref 80–100)
METHOD TYPE: SIGNIFICANT CHANGE UP
METHOD TYPE: SIGNIFICANT CHANGE UP
NRBC # BLD: 0 /100 WBCS — SIGNIFICANT CHANGE UP (ref 0–0)
ORGANISM # SPEC MICROSCOPIC CNT: SIGNIFICANT CHANGE UP
P AERUGINOSA DNA BLD POS NAA+NON-PROBE: SIGNIFICANT CHANGE UP
PHOSPHATE SERPL-MCNC: 3.3 MG/DL — SIGNIFICANT CHANGE UP (ref 2.5–4.5)
PLATELET # BLD AUTO: 106 K/UL — LOW (ref 150–400)
POTASSIUM SERPL-MCNC: 4.3 MMOL/L — SIGNIFICANT CHANGE UP (ref 3.5–5.3)
POTASSIUM SERPL-SCNC: 4.3 MMOL/L — SIGNIFICANT CHANGE UP (ref 3.5–5.3)
PROT SERPL-MCNC: 6 G/DL — SIGNIFICANT CHANGE UP (ref 6–8.3)
RBC # BLD: 2.77 M/UL — LOW (ref 4.2–5.8)
RBC # FLD: 14 % — SIGNIFICANT CHANGE UP (ref 10.3–14.5)
SODIUM SERPL-SCNC: 135 MMOL/L — SIGNIFICANT CHANGE UP (ref 135–145)
SPECIMEN SOURCE: SIGNIFICANT CHANGE UP
TACROLIMUS SERPL-MCNC: 13.2 NG/ML — SIGNIFICANT CHANGE UP
WBC # BLD: 3.16 K/UL — LOW (ref 3.8–10.5)
WBC # FLD AUTO: 3.16 K/UL — LOW (ref 3.8–10.5)

## 2020-07-07 PROCEDURE — 99232 SBSQ HOSP IP/OBS MODERATE 35: CPT

## 2020-07-07 PROCEDURE — 99232 SBSQ HOSP IP/OBS MODERATE 35: CPT | Mod: GC

## 2020-07-07 RX ORDER — NIFEDIPINE 30 MG
30 TABLET, EXTENDED RELEASE 24 HR ORAL DAILY
Refills: 0 | Status: DISCONTINUED | OUTPATIENT
Start: 2020-07-07 | End: 2020-07-10

## 2020-07-07 RX ORDER — METOPROLOL TARTRATE 50 MG
100 TABLET ORAL DAILY
Refills: 0 | Status: DISCONTINUED | OUTPATIENT
Start: 2020-07-07 | End: 2020-07-10

## 2020-07-07 RX ORDER — MAGNESIUM SULFATE 500 MG/ML
2 VIAL (ML) INJECTION ONCE
Refills: 0 | Status: COMPLETED | OUTPATIENT
Start: 2020-07-07 | End: 2020-07-07

## 2020-07-07 RX ORDER — TACROLIMUS 5 MG/1
3 CAPSULE ORAL
Refills: 0 | Status: DISCONTINUED | OUTPATIENT
Start: 2020-07-08 | End: 2020-07-10

## 2020-07-07 RX ORDER — INSULIN LISPRO 100/ML
VIAL (ML) SUBCUTANEOUS AT BEDTIME
Refills: 0 | Status: DISCONTINUED | OUTPATIENT
Start: 2020-07-07 | End: 2020-07-10

## 2020-07-07 RX ADMIN — Medication 2: at 21:56

## 2020-07-07 RX ADMIN — Medication 100 MILLIGRAM(S): at 12:11

## 2020-07-07 RX ADMIN — Medication 1: at 08:37

## 2020-07-07 RX ADMIN — CEFEPIME 100 MILLIGRAM(S): 1 INJECTION, POWDER, FOR SOLUTION INTRAMUSCULAR; INTRAVENOUS at 17:25

## 2020-07-07 RX ADMIN — GABAPENTIN 300 MILLIGRAM(S): 400 CAPSULE ORAL at 12:11

## 2020-07-07 RX ADMIN — TACROLIMUS 4 MILLIGRAM(S): 5 CAPSULE ORAL at 07:54

## 2020-07-07 RX ADMIN — Medication 3: at 13:26

## 2020-07-07 RX ADMIN — Medication 3: at 18:39

## 2020-07-07 RX ADMIN — Medication 5 MILLIGRAM(S): at 05:54

## 2020-07-07 RX ADMIN — Medication 15 MILLILITER(S): at 21:55

## 2020-07-07 RX ADMIN — CEFEPIME 100 MILLIGRAM(S): 1 INJECTION, POWDER, FOR SOLUTION INTRAMUSCULAR; INTRAVENOUS at 05:54

## 2020-07-07 RX ADMIN — Medication 15 MILLILITER(S): at 05:55

## 2020-07-07 RX ADMIN — Medication 30 MILLIGRAM(S): at 13:18

## 2020-07-07 RX ADMIN — FAMOTIDINE 20 MILLIGRAM(S): 10 INJECTION INTRAVENOUS at 12:11

## 2020-07-07 RX ADMIN — Medication 15 MILLILITER(S): at 13:18

## 2020-07-07 RX ADMIN — Medication 1 TABLET(S): at 12:11

## 2020-07-07 RX ADMIN — TAMSULOSIN HYDROCHLORIDE 0.4 MILLIGRAM(S): 0.4 CAPSULE ORAL at 21:56

## 2020-07-07 RX ADMIN — Medication 50 GRAM(S): at 07:53

## 2020-07-07 NOTE — PROGRESS NOTE ADULT - SUBJECTIVE AND OBJECTIVE BOX
[facility] DIVISION OF KIDNEY DISEASES AND HYPERTENSION -- FOLLOW UP NOTE  --------------------------------------------------------------------------------  Chief Complaint:  pyelo CHELA    24 hour events/subjective:  Pt feels little better.  eating well now.       PAST HISTORY  --------------------------------------------------------------------------------  No significant changes to PMH, PSH, FHx, SHx, unless otherwise noted    ALLERGIES & MEDICATIONS  --------------------------------------------------------------------------------  Allergies    No Known Allergies    Intolerances      Standing Inpatient Medications  allopurinol 100 milliGRAM(s) Oral daily  cefepime   IVPB 1000 milliGRAM(s) IV Intermittent every 12 hours  citric acid/sodium citrate Solution 15 milliLiter(s) Oral three times a day  dextrose 5%. 1000 milliLiter(s) IV Continuous <Continuous>  dextrose 50% Injectable 12.5 Gram(s) IV Push once  dextrose 50% Injectable 25 Gram(s) IV Push once  dextrose 50% Injectable 25 Gram(s) IV Push once  famotidine    Tablet 20 milliGRAM(s) Oral daily  gabapentin 300 milliGRAM(s) Oral daily  insulin lispro (HumaLOG) corrective regimen sliding scale   SubCutaneous three times a day before meals  predniSONE   Tablet 5 milliGRAM(s) Oral daily  sodium chloride 0.9%. 1000 milliLiter(s) IV Continuous <Continuous>  tacrolimus ER Tablet (ENVARSUS XR) 4 milliGRAM(s) Oral <User Schedule>  tamsulosin 0.4 milliGRAM(s) Oral at bedtime  trimethoprim   80 mG/sulfamethoxazole 400 mG 1 Tablet(s) Oral daily    PRN Inpatient Medications  dextrose 40% Gel 15 Gram(s) Oral once PRN  glucagon  Injectable 1 milliGRAM(s) IntraMuscular once PRN      REVIEW OF SYSTEMS  --------------------------------------------------------------------------------  Gen: mild weakness   Skin: No rashes  Head/Eyes/Ears/Mouth: No headache;No sore throat  Respiratory: No dyspnea, cough,   CV: No chest pain, PND, orthopnea  GI: chronic diarrhea  Transplant: No pain  : No increased frequency, dysuria, hematuria, nocturia  MSK: No joint pain/swelling; no back pain; no edema  Neuro: No dizziness/lightheadedness, weakness, seizures, numbness, tingling  Psych: No significant nervousness, anxiety, stress, depression    All other systems were reviewed and are negative, except as noted.    VITALS/PHYSICAL EXAM  --------------------------------------------------------------------------------  T(C): 36.9 (07-07-20 @ 09:00), Max: 37.2 (07-06-20 @ 13:25)  HR: 85 (07-07-20 @ 09:00) (76 - 92)  BP: 160/75 (07-07-20 @ 09:00) (129/67 - 160/75)  RR: 18 (07-07-20 @ 09:00) (18 - 18)  SpO2: 98% (07-07-20 @ 09:00) (96% - 99%)  Wt(kg): --  Height (cm): 177.8 (07-05-20 @ 12:58)  Weight (kg): 95.3 (07-05-20 @ 12:58)  BMI (kg/m2): 30.1 (07-05-20 @ 12:58)  BSA (m2): 2.13 (07-05-20 @ 12:58)      07-06-20 @ 07:01  -  07-07-20 @ 07:00  --------------------------------------------------------  IN: 2395 mL / OUT: 2000 mL / NET: 395 mL      Physical Exam:  	Gen: NAD  	HEENT: PERRL, supple neck, clear oropharynx  	Pulm: CTA B/L  	CV: RRR, S1S2; no rub  	Back: No spinal or CVA tenderness; no sacral edema  	Abd: +BS, soft, nontender/nondistended                      Transplant: No tenderness, swelling  	: No suprapubic tenderness  	UE: Warm, FROM; no edema; no asterixis  	LE: Warm, FROM; no edema  	Neuro: No focal deficits  	Psych: Normal affect and mood  	Skin: Warm, without rashes      LABS/STUDIES  --------------------------------------------------------------------------------              8.7    3.16  >-----------<  106      [07-07-20 @ 05:56]              26.9     135  |  105  |  60  ----------------------------<  140      [07-07-20 @ 05:55]  4.3   |  16  |  5.05        Ca     8.6     [07-07-20 @ 05:55]      Mg     1.6     [07-07-20 @ 05:55]      Phos  3.3     [07-07-20 @ 05:55]    TPro  6.0  /  Alb  3.0  /  TBili  0.4  /  DBili  x   /  AST  38  /  ALT  28  /  AlkPhos  67  [07-07-20 @ 05:55]    PT/INR: PT 14.8 , INR 1.30       [07-05-20 @ 13:36]  PTT: 31.4       [07-05-20 @ 13:36]      Creatinine Trend:  SCr 5.05 [07-07 @ 05:55]  SCr 5.45 [07-06 @ 15:44]  SCr 6.00 [07-06 @ 06:21]  SCr 6.11 [07-05 @ 13:36]    Tacrolimus (), Serum: 6.7 ng/mL (07-06 @ 08:22)            Urinalysis - [07-05-20 @ 16:13]      Color Yellow / Appearance Slightly Turbid / SG 1.023 / pH 6.0      Gluc Negative / Ketone Negative  / Bili Negative / Urobili Negative       Blood Small / Protein 100 mg/dL / Leuk Est Large / Nitrite Negative      RBC 2 / WBC 42 / Hyaline 8 / Gran  / Sq Epi  / Non Sq Epi 8 / Bacteria Few      HbA1c 6.0      [08-09-19 @ 23:48]

## 2020-07-07 NOTE — PROGRESS NOTE ADULT - SUBJECTIVE AND OBJECTIVE BOX
Transplant Surgery - Multidisciplinary Rounds  --------------------------------------------------------------  DDRT HCV+    Date: 2018       Admitted 2020 with weakness and fever x 3 days    Present:   Patient seen with multidisciplinary team including Transplant Surgeon: Dr. Yao. Transplant Nephrologist: Dr. CHAYO Bhakta.  Pharmacist: Abhishek Harrell. NP: Roxann Jorgensen and Silva William during am rounds and examined with .  Disciplines not in attendance will be notified of the plan.     HPI: 70 y/o/M with a PMH of ESRD 2/2 bilateral nephrectomy from neoplasm, hypertension, and NIDDM.  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 h/o CMV viremia.     Re-admitted in 2019 with E coli bacteremia/urosepsis; treated w/ IV vanc/cefepime/zosyn.   Re-admitted in 2019 with severe CHELA 2/2 FK toxicity (level on admission was 39), +UA, was treated w/ cefepime.   Re-admitted in 2019 w/ uptrending Cr on outpatient labs (3.4), was  treated with pulse steroid for ACR 1B.   Re-admitted in 2020 with CHELA and hydronephrosis, PCN placed  by IR, which was converted to PCNU on .     Presented to ED with fever and generalized weakness past 3 days. Denies dysuria, hematuria, has some frequency on urination. Recently tx for UTI with keflex, completed abx a week ago. Urine cx on 6/15 coag neg staph, hx of E coli UTI in the past.  In ED: Febrile 38.6, , /60. Labs significant for elevated creat  ~6 (baseline ~2). Recently had ureteral stent placed on ; PCN in place (capped). Non contrast CT a/p in ED non specific. CXR clear. Awaiting for renal doppler. Denies respiratory symptoms. Denies abdominal pain. Received Cefepime 2g in ED with NS bolus 2L.     BK PCR neg on 6/15   CMV PCR detected 6/15    Interval Events:    - TMax 37.2   VSS   - 2020  UCx: GNR     BCx x2: pseudomonas   Sensitivities pending   Cefepime continued   - MMF held  - PCNU opened to drain.  UOP 1.8L   - Creatinine 5.05 from 6.0       Potential Discharge date: pending clinical improvement    Education:  Medications    Plan of care:  See Below    MEDICATIONS  (STANDING):  allopurinol 100 milliGRAM(s) Oral daily  cefepime   IVPB 1000 milliGRAM(s) IV Intermittent every 12 hours  citric acid/sodium citrate Solution 15 milliLiter(s) Oral three times a day  dextrose 5%. 1000 milliLiter(s) (50 mL/Hr) IV Continuous <Continuous>  dextrose 50% Injectable 12.5 Gram(s) IV Push once  dextrose 50% Injectable 25 Gram(s) IV Push once  dextrose 50% Injectable 25 Gram(s) IV Push once  famotidine    Tablet 20 milliGRAM(s) Oral daily  gabapentin 300 milliGRAM(s) Oral daily  insulin lispro (HumaLOG) corrective regimen sliding scale   SubCutaneous three times a day before meals  metoprolol succinate  milliGRAM(s) Oral daily  NIFEdipine XL 30 milliGRAM(s) Oral daily  predniSONE   Tablet 5 milliGRAM(s) Oral daily  sodium chloride 0.9%. 1000 milliLiter(s) (70 mL/Hr) IV Continuous <Continuous>  tamsulosin 0.4 milliGRAM(s) Oral at bedtime  trimethoprim   80 mG/sulfamethoxazole 400 mG 1 Tablet(s) Oral daily    MEDICATIONS  (PRN):  dextrose 40% Gel 15 Gram(s) Oral once PRN Blood Glucose LESS THAN 70 milliGRAM(s)/deciliter  glucagon  Injectable 1 milliGRAM(s) IntraMuscular once PRN Glucose LESS THAN 70 milligrams/deciliter      PAST MEDICAL & SURGICAL HISTORY:  Medial meniscus tear: &#x27; 90&#x27;s  Right  Bowel obstruction: &#x27; 2017   surgically treated  Anal fissure: dx: &#x27; 2018   No surgery  Benign prostatic hypertrophy  Hepatitis C: In Donor Kidney: patient received treatment for Hep. 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      Vital Signs Last 24 Hrs  T(C): 36.9 (2020 09:00), Max: 37.2 (2020 13:25)  T(F): 98.5 (2020 09:00), Max: 98.9 (2020 13:25)  HR: 85 (2020 09:00) (76 - 92)  BP: 160/75 (2020 09:00) (129/67 - 160/75)  BP(mean): 106 (2020 05:28) (99 - 106)  RR: 18 (2020 09:00) (18 - 18)  SpO2: 98% (2020 09:00) (96% - 99%)    I&O's Summary    2020 07:01  -  2020 07:00  --------------------------------------------------------  IN: 2395 mL / OUT: 2000 mL / NET: 395 mL    2020 07:01  -  2020 12:40  --------------------------------------------------------  IN: 520 mL / OUT: 630 mL / NET: -110 mL                              8.7    3.16  )-----------( 106      ( 2020 05:56 )             26.9         135  |  105  |  60<H>  ----------------------------<  140<H>  4.3   |  16<L>  |  5.05<H>    Ca    8.6      2020 05:55  Phos  3.3     -  Mg     1.6         TPro  6.0  /  Alb  3.0<L>  /  TBili  0.4  /  DBili  x   /  AST  38  /  ALT  28  /  AlkPhos  67      Tacrolimus (), Serum: 13.2 ng/mL ( @ 08:17)        Culture - Urine (collected 20 @ 19:39)  Source: .Urine Clean Catch (Midstream)  Preliminary Report (20 @ 16:47):    10,000 - 49,000 CFU/mL Gram Negative Rods    Culture - Blood (collected 20 @ 16:38)  Source: .Blood Blood-Peripheral  Gram Stain (20 @ 01:19):    Growth in aerobic bottle: Gram Negative Rods  Preliminary Report (20 @ 01:19):    Growth in aerobic bottle: Gram Negative Rods    ***Blood Panel PCR results on this specimen are available    approximately 3 hours after the Gram stain result.***    Gram stain, PCR, and/or culture results may not always    correspond due to difference in methodologies.    ************************************************************    This PCR assay was performed using [employer].    The following targets are tested for: Enterococcus,    vancomycin resistant enterococci, Listeria monocytogenes,    coagulase negative staphylococci, S. aureus,    methicillin resistant S. aureus, Streptococcus agalactiae    (Group B), S. pneumoniae, S. pyogenes (Group A),    Acinetobacter baumannii, Enterobacter cloacae, E. coli,    Klebsiella oxytoca, K. pneumoniae, Proteus sp.,    Serratia marcescens, Haemophilus influenzae,    Neisseria meningitidis, Pseudomonas aeruginosa, Candida    albicans, C. glabrata, C krusei, C parapsilosis,    C. tropicalis and the KPC resistance gene.    "Due to technical problems, Proteus sp. will Not be reported as part of    the BCID panel until further notice"  Organism: Blood Culture PCR (20 @ 03:10)  Organism: Blood Culture PCR (20 @ 03:10)    Culture - Blood (collected 20 @ 16:38)  Source: .Blood Blood-Peripheral  Preliminary Report (20 @ 17:01):    No growth to date.        Review of systems  Gen: No weight changes, fatigue, fevers/chills  Skin: No rashes  Head/Eyes/Ears/Mouth: No headache; Normal hearing; Normal vision w/o blurriness; No sinus pain/discomfort, sore throat  Respiratory: No dyspnea, cough, wheezing, hemoptysis  CV: No chest pain, PND, orthopnea  GI: Denies abdominal pain, diarrhea, constipation, nausea, vomiting, melena, hematochezia  : No increased frequency, dysuria, hematuria, nocturia. PCNU open to drain  MSK: No joint pain/swelling; no back pain; no edema  Neuro: No dizziness/lightheadedness, weakness, seizures, numbness, tingling  Heme: No easy bruising or bleeding  Endo: No heat/cold intolerance  Psych: No significant nervousness, anxiety, stress, depression  All other systems were reviewed and are negative, except as noted.      PHYSICAL EXAM:  Constitutional: Well developed / well nourished  Eyes: Anicteric, PERRLA  ENMT: nc/at  Neck: supple  Respiratory: CTA B/L  Cardiovascular: RRR  Gastrointestinal: Soft abdomen, NT, ND  Genitourinary: PCNU open to drain, clear yellow urine  Extremities: SCD's in place and working bilaterally, No edema  Vascular: Palpable dp pulses bilaterally  Neurological: A&O x3  Skin: wound well healed  Musculoskeletal: Moving all extremities  Psychiatric: Responsive

## 2020-07-08 LAB
-  AMIKACIN: SIGNIFICANT CHANGE UP
-  AZTREONAM: SIGNIFICANT CHANGE UP
-  CEFEPIME: SIGNIFICANT CHANGE UP
-  CEFTAZIDIME: SIGNIFICANT CHANGE UP
-  CIPROFLOXACIN: SIGNIFICANT CHANGE UP
-  GENTAMICIN: SIGNIFICANT CHANGE UP
-  LEVOFLOXACIN: SIGNIFICANT CHANGE UP
-  MEROPENEM: SIGNIFICANT CHANGE UP
-  PIPERACILLIN/TAZOBACTAM: SIGNIFICANT CHANGE UP
-  TOBRAMYCIN: SIGNIFICANT CHANGE UP
A1C WITH ESTIMATED AVERAGE GLUCOSE RESULT: 6.4 % — HIGH (ref 4–5.6)
ALBUMIN SERPL ELPH-MCNC: 3.2 G/DL — LOW (ref 3.3–5)
ALP SERPL-CCNC: 80 U/L — SIGNIFICANT CHANGE UP (ref 40–120)
ALT FLD-CCNC: 34 U/L — SIGNIFICANT CHANGE UP (ref 10–45)
ANION GAP SERPL CALC-SCNC: 14 MMOL/L — SIGNIFICANT CHANGE UP (ref 5–17)
AST SERPL-CCNC: 38 U/L — SIGNIFICANT CHANGE UP (ref 10–40)
BILIRUB SERPL-MCNC: 0.2 MG/DL — SIGNIFICANT CHANGE UP (ref 0.2–1.2)
BUN SERPL-MCNC: 50 MG/DL — HIGH (ref 7–23)
CALCIUM SERPL-MCNC: 8.8 MG/DL — SIGNIFICANT CHANGE UP (ref 8.4–10.5)
CHLORIDE SERPL-SCNC: 106 MMOL/L — SIGNIFICANT CHANGE UP (ref 96–108)
CO2 SERPL-SCNC: 14 MMOL/L — LOW (ref 22–31)
CREAT SERPL-MCNC: 3.62 MG/DL — HIGH (ref 0.5–1.3)
CULTURE RESULTS: SIGNIFICANT CHANGE UP
ESTIMATED AVERAGE GLUCOSE: 137 MG/DL — HIGH (ref 68–114)
GLUCOSE BLDC GLUCOMTR-MCNC: 209 MG/DL — HIGH (ref 70–99)
GLUCOSE BLDC GLUCOMTR-MCNC: 232 MG/DL — HIGH (ref 70–99)
GLUCOSE BLDC GLUCOMTR-MCNC: 250 MG/DL — HIGH (ref 70–99)
GLUCOSE BLDC GLUCOMTR-MCNC: 259 MG/DL — HIGH (ref 70–99)
GLUCOSE SERPL-MCNC: 186 MG/DL — HIGH (ref 70–99)
GRAM STN FLD: SIGNIFICANT CHANGE UP
HCT VFR BLD CALC: 28.1 % — LOW (ref 39–50)
HGB BLD-MCNC: 8.8 G/DL — LOW (ref 13–17)
MAGNESIUM SERPL-MCNC: 2 MG/DL — SIGNIFICANT CHANGE UP (ref 1.6–2.6)
MCHC RBC-ENTMCNC: 30 PG — SIGNIFICANT CHANGE UP (ref 27–34)
MCHC RBC-ENTMCNC: 31.3 GM/DL — LOW (ref 32–36)
MCV RBC AUTO: 95.9 FL — SIGNIFICANT CHANGE UP (ref 80–100)
METHOD TYPE: SIGNIFICANT CHANGE UP
NRBC # BLD: 0 /100 WBCS — SIGNIFICANT CHANGE UP (ref 0–0)
ORGANISM # SPEC MICROSCOPIC CNT: SIGNIFICANT CHANGE UP
PHOSPHATE SERPL-MCNC: 2.7 MG/DL — SIGNIFICANT CHANGE UP (ref 2.5–4.5)
PLATELET # BLD AUTO: 135 K/UL — LOW (ref 150–400)
POTASSIUM SERPL-MCNC: 4.2 MMOL/L — SIGNIFICANT CHANGE UP (ref 3.5–5.3)
POTASSIUM SERPL-SCNC: 4.2 MMOL/L — SIGNIFICANT CHANGE UP (ref 3.5–5.3)
PROT SERPL-MCNC: 6 G/DL — SIGNIFICANT CHANGE UP (ref 6–8.3)
RBC # BLD: 2.93 M/UL — LOW (ref 4.2–5.8)
RBC # FLD: 13.9 % — SIGNIFICANT CHANGE UP (ref 10.3–14.5)
SODIUM SERPL-SCNC: 134 MMOL/L — LOW (ref 135–145)
SPECIMEN SOURCE: SIGNIFICANT CHANGE UP
TACROLIMUS SERPL-MCNC: 12.9 NG/ML — SIGNIFICANT CHANGE UP
WBC # BLD: 2.68 K/UL — LOW (ref 3.8–10.5)
WBC # FLD AUTO: 2.68 K/UL — LOW (ref 3.8–10.5)

## 2020-07-08 PROCEDURE — 99232 SBSQ HOSP IP/OBS MODERATE 35: CPT | Mod: GC

## 2020-07-08 PROCEDURE — 99232 SBSQ HOSP IP/OBS MODERATE 35: CPT

## 2020-07-08 RX ORDER — INSULIN LISPRO 100/ML
VIAL (ML) SUBCUTANEOUS
Refills: 0 | Status: DISCONTINUED | OUTPATIENT
Start: 2020-07-08 | End: 2020-07-10

## 2020-07-08 RX ORDER — INSULIN LISPRO 100/ML
VIAL (ML) SUBCUTANEOUS AT BEDTIME
Refills: 0 | Status: DISCONTINUED | OUTPATIENT
Start: 2020-07-08 | End: 2020-07-10

## 2020-07-08 RX ADMIN — Medication 2: at 08:46

## 2020-07-08 RX ADMIN — Medication 2: at 18:46

## 2020-07-08 RX ADMIN — FAMOTIDINE 20 MILLIGRAM(S): 10 INJECTION INTRAVENOUS at 12:11

## 2020-07-08 RX ADMIN — Medication 3: at 13:16

## 2020-07-08 RX ADMIN — CEFEPIME 100 MILLIGRAM(S): 1 INJECTION, POWDER, FOR SOLUTION INTRAMUSCULAR; INTRAVENOUS at 06:03

## 2020-07-08 RX ADMIN — Medication 15 MILLILITER(S): at 22:10

## 2020-07-08 RX ADMIN — TAMSULOSIN HYDROCHLORIDE 0.4 MILLIGRAM(S): 0.4 CAPSULE ORAL at 22:10

## 2020-07-08 RX ADMIN — CEFEPIME 100 MILLIGRAM(S): 1 INJECTION, POWDER, FOR SOLUTION INTRAMUSCULAR; INTRAVENOUS at 17:23

## 2020-07-08 RX ADMIN — Medication 100 MILLIGRAM(S): at 12:11

## 2020-07-08 RX ADMIN — GABAPENTIN 300 MILLIGRAM(S): 400 CAPSULE ORAL at 12:11

## 2020-07-08 RX ADMIN — Medication 1 TABLET(S): at 12:11

## 2020-07-08 RX ADMIN — SODIUM CHLORIDE 70 MILLILITER(S): 9 INJECTION INTRAMUSCULAR; INTRAVENOUS; SUBCUTANEOUS at 06:02

## 2020-07-08 RX ADMIN — Medication 5 MILLIGRAM(S): at 06:03

## 2020-07-08 RX ADMIN — Medication 15 MILLILITER(S): at 06:03

## 2020-07-08 RX ADMIN — Medication 15 MILLILITER(S): at 13:16

## 2020-07-08 NOTE — PROVIDER CONTACT NOTE (CRITICAL VALUE NOTIFICATION) - TEST AND RESULT REPORTED:
blood culture (aerobic bottle)-Gram (-) rods  urine culture (10,000-49,000) pseudomonas aeruginosa-carbapenem resistant

## 2020-07-08 NOTE — PROGRESS NOTE ADULT - SUBJECTIVE AND OBJECTIVE BOX
[facility] DIVISION OF KIDNEY DISEASES AND HYPERTENSION -- FOLLOW UP NOTE  --------------------------------------------------------------------------------  Chief Complaint:  CHELA, pyelo    24 hour events/subjective:  Pt starting to feel little swollen.       PAST HISTORY  --------------------------------------------------------------------------------  No significant changes to PMH, PSH, FHx, SHx, unless otherwise noted    ALLERGIES & MEDICATIONS  --------------------------------------------------------------------------------  Allergies    No Known Allergies    Intolerances      Standing Inpatient Medications  allopurinol 100 milliGRAM(s) Oral daily  cefepime   IVPB 1000 milliGRAM(s) IV Intermittent every 12 hours  citric acid/sodium citrate Solution 15 milliLiter(s) Oral three times a day  dextrose 5%. 1000 milliLiter(s) IV Continuous <Continuous>  dextrose 50% Injectable 12.5 Gram(s) IV Push once  dextrose 50% Injectable 25 Gram(s) IV Push once  dextrose 50% Injectable 25 Gram(s) IV Push once  famotidine    Tablet 20 milliGRAM(s) Oral daily  gabapentin 300 milliGRAM(s) Oral daily  insulin lispro (HumaLOG) corrective regimen sliding scale   SubCutaneous three times a day before meals  insulin lispro (HumaLOG) corrective regimen sliding scale   SubCutaneous at bedtime  metoprolol succinate  milliGRAM(s) Oral daily  NIFEdipine XL 30 milliGRAM(s) Oral daily  predniSONE   Tablet 5 milliGRAM(s) Oral daily  tacrolimus ER Tablet (ENVARSUS XR) 3 milliGRAM(s) Oral <User Schedule>  tamsulosin 0.4 milliGRAM(s) Oral at bedtime  trimethoprim   80 mG/sulfamethoxazole 400 mG 1 Tablet(s) Oral daily    PRN Inpatient Medications  dextrose 40% Gel 15 Gram(s) Oral once PRN  glucagon  Injectable 1 milliGRAM(s) IntraMuscular once PRN      REVIEW OF SYSTEMS  --------------------------------------------------------------------------------  Gen: No fatigue, fevers/chills, weakness  Skin: No rashes  Head/Eyes/Ears/Mouth: No headache;No sore throat  Respiratory: No dyspnea, cough,   CV: No chest pain, PND, orthopnea  GI: No abdominal pain, diarrhea, constipation, nausea, vomiting  Transplant: No pain  : No increased frequency, dysuria, hematuria, nocturia  MSK: No joint pain/swelling; no back pain; no edema  Neuro: No dizziness/lightheadedness, weakness, seizures, numbness, tingling  Psych: No significant nervousness, anxiety, stress, depression    All other systems were reviewed and are negative, except as noted.    VITALS/PHYSICAL EXAM  --------------------------------------------------------------------------------  T(C): 36.9 (07-08-20 @ 09:00), Max: 36.9 (07-08-20 @ 01:00)  HR: 81 (07-08-20 @ 09:00) (76 - 93)  BP: 126/62 (07-08-20 @ 09:00) (126/62 - 178/81)  RR: 18 (07-08-20 @ 09:00) (18 - 26)  SpO2: 97% (07-08-20 @ 09:00) (97% - 98%)  Wt(kg): --        07-07-20 @ 07:01  -  07-08-20 @ 07:00  --------------------------------------------------------  IN: 2350 mL / OUT: 2915 mL / NET: -565 mL    07-08-20 @ 07:01  -  07-08-20 @ 10:00  --------------------------------------------------------  IN: 0 mL / OUT: 300 mL / NET: -300 mL      Physical Exam:  	Gen: NAD  	HEENT: PERRL, supple neck, clear oropharynx  	Pulm: CTA B/L  	CV: RRR, S1S2; no rub  	Back: No spinal or CVA tenderness; no sacral edema  	Abd: +BS, soft, nontender/nondistended                      Transplant: No tenderness, swelling  	: No suprapubic tenderness  	UE: Warm, FROM; no edema; no asterixis  	LE: Warm, FROM; no edema  	Neuro: No focal deficits  	Psych: Normal affect and mood  	Skin: Warm, without rashes      LABS/STUDIES  --------------------------------------------------------------------------------              8.8    2.68  >-----------<  135      [07-08-20 @ 06:08]              28.1     134  |  106  |  50  ----------------------------<  186      [07-08-20 @ 06:07]  4.2   |  14  |  3.62        Ca     8.8     [07-08-20 @ 06:07]      Mg     2.0     [07-08-20 @ 06:07]      Phos  2.7     [07-08-20 @ 06:07]    TPro  6.0  /  Alb  3.2  /  TBili  0.2  /  DBili  x   /  AST  38  /  ALT  34  /  AlkPhos  80  [07-08-20 @ 06:07]          Creatinine Trend:  SCr 3.62 [07-08 @ 06:07]  SCr 5.05 [07-07 @ 05:55]  SCr 5.45 [07-06 @ 15:44]  SCr 6.00 [07-06 @ 06:21]  SCr 6.11 [07-05 @ 13:36]    Tacrolimus (), Serum: 13.2 ng/mL (07-07 @ 08:17)  Tacrolimus (), Serum: 6.7 ng/mL (07-06 @ 08:22)            Urinalysis - [07-05-20 @ 16:13]      Color Yellow / Appearance Slightly Turbid / SG 1.023 / pH 6.0      Gluc Negative / Ketone Negative  / Bili Negative / Urobili Negative       Blood Small / Protein 100 mg/dL / Leuk Est Large / Nitrite Negative      RBC 2 / WBC 42 / Hyaline 8 / Gran  / Sq Epi  / Non Sq Epi 8 / Bacteria Few      HbA1c 6.0      [08-09-19 @ 23:48]

## 2020-07-08 NOTE — PROGRESS NOTE ADULT - SUBJECTIVE AND OBJECTIVE BOX
Transplant Surgery - Multidisciplinary Rounds  --------------------------------------------------------------  DDRT HCV+    Date: 2018       Admitted 2020 with weakness and fever x 3 days    Present:   Patient seen with multidisciplinary team including Transplant Surgeon: Dr. Yao. Transplant Nephrologist: Dr. CHAYO Bhakta.  Pharmacist: Abhishek Harrell. NP: Roxann Jorgensen and MATY Harp during am rounds and examined with .  Disciplines not in attendance will be notified of the plan.     HPI: 70 y/o/M with a PMH of ESRD 2/2 bilateral nephrectomy from neoplasm, hypertension, and NIDDM.  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 h/o CMV viremia.     Re-admitted in 2019 with E coli bacteremia/urosepsis; treated w/ IV vanc/cefepime/zosyn.   Re-admitted in 2019 with severe CHELA 2/2 FK toxicity (level on admission was 39), +UA, was treated w/ cefepime.   Re-admitted in 2019 w/ uptrending Cr on outpatient labs (3.4), was  treated with pulse steroid for ACR 1B.   Re-admitted in 2020 with CHELA and hydronephrosis, PCN placed  by IR, which was converted to PCNU on .     Presented to ED with fever and generalized weakness past 3 days. Denies dysuria, hematuria, has some frequency on urination. Recently tx for UTI with keflex, completed abx a week ago. Urine cx on 6/15 coag neg staph, hx of E coli UTI in the past.  In ED: Febrile 38.6, , /60. Labs significant for elevated creat  ~6 (baseline ~2). Recently had ureteral stent placed on ; PCN in place (capped). Non contrast CT a/p in ED non specific. CXR clear. Awaiting for renal doppler. Denies respiratory symptoms. Denies abdominal pain. Received Cefepime 2g in ED with NS bolus 2L.     BK PCR neg on 6/15   CMV PCR detected 6/15    Interval Events:    Afebrile on Cefepime, VS stable  UO ~2L via PCN  Cr trending down appropriately  No other complaints or issues at this time    Potential Discharge date: pending clinical improvement    Education:  Medications    Plan of care:  See Below      MEDICATIONS  (STANDING):  allopurinol 100 milliGRAM(s) Oral daily  cefepime   IVPB 1000 milliGRAM(s) IV Intermittent every 12 hours  citric acid/sodium citrate Solution 15 milliLiter(s) Oral three times a day  dextrose 5%. 1000 milliLiter(s) (50 mL/Hr) IV Continuous <Continuous>  dextrose 50% Injectable 12.5 Gram(s) IV Push once  dextrose 50% Injectable 25 Gram(s) IV Push once  dextrose 50% Injectable 25 Gram(s) IV Push once  famotidine    Tablet 20 milliGRAM(s) Oral daily  gabapentin 300 milliGRAM(s) Oral daily  insulin lispro (HumaLOG) corrective regimen sliding scale   SubCutaneous at bedtime  insulin lispro (HumaLOG) corrective regimen sliding scale   SubCutaneous three times a day before meals  insulin lispro (HumaLOG) corrective regimen sliding scale   SubCutaneous at bedtime  metoprolol succinate  milliGRAM(s) Oral daily  NIFEdipine XL 30 milliGRAM(s) Oral daily  predniSONE   Tablet 5 milliGRAM(s) Oral daily  tacrolimus ER Tablet (ENVARSUS XR) 3 milliGRAM(s) Oral <User Schedule>  tamsulosin 0.4 milliGRAM(s) Oral at bedtime  trimethoprim   80 mG/sulfamethoxazole 400 mG 1 Tablet(s) Oral daily    MEDICATIONS  (PRN):  dextrose 40% Gel 15 Gram(s) Oral once PRN Blood Glucose LESS THAN 70 milliGRAM(s)/deciliter  glucagon  Injectable 1 milliGRAM(s) IntraMuscular once PRN Glucose LESS THAN 70 milligrams/deciliter      PAST MEDICAL & SURGICAL HISTORY:  Medial meniscus tear: &#x27; 90&#x27;s  Right  Bowel obstruction: &#x27; 2017   surgically treated  Anal fissure: dx: &#x27; 2018   No surgery  Benign prostatic hypertrophy  Hepatitis C: In Donor Kidney: patient received treatment for Hep. 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      Vital Signs Last 24 Hrs  T(C): 36.7 (2020 18:00), Max: 36.9 (2020 01:00)  T(F): 98.1 (2020 18:00), Max: 98.5 (2020 05:40)  HR: 70 (2020 18:00) (70 - 93)  BP: 140/64 (2020 18:00) (126/62 - 140/64)  BP(mean): 91 (2020 05:40) (91 - 91)  RR: 18 (2020 18:00) (18 - 19)  SpO2: 98% (2020 18:00) (97% - 100%)    I&O's Summary    2020 07:  -  2020 07:00  --------------------------------------------------------  IN: 2350 mL / OUT: 2915 mL / NET: -565 mL    2020 07:01  -  2020 21:02  --------------------------------------------------------  IN: 570 mL / OUT: 1600 mL / NET: -1030 mL                              8.8    2.68  )-----------( 135      ( 2020 06:08 )             28.1     07-08    134<L>  |  106  |  50<H>  ----------------------------<  186<H>  4.2   |  14<L>  |  3.62<H>    Ca    8.8      2020 06:07  Phos  2.7     07-08  Mg     2.0     07-08    TPro  6.0  /  Alb  3.2<L>  /  TBili  0.2  /  DBili  x   /  AST  38  /  ALT  34  /  AlkPhos  80  07-    Tacrolimus (), Serum: 12.9 ng/mL ( @ 09:19)        Culture - Urine (collected 20 @ 19:39)  Source: .Urine Clean Catch (Midstream)  Final Report (20 @ 17:41):    10,000 - 49,000 CFU/mL Pseudomonas aeruginosa (Carbapenem Resistant)  Organism: Pseudomonas aeruginosa (Carbapenem Resistant) (20 @ 17:42)  Organism: Pseudomonas aeruginosa (Carbapenem Resistant) (20 @ 17:42)    Culture - Blood (collected 20 @ 16:38)  Source: .Blood Blood-Peripheral  Gram Stain (20 @ 01:19):    Growth in aerobic bottle: Gram Negative Rods  Final Report (20 @ 14:32):    Growth in aerobic bottle: Pseudomonas aeruginosa    ***Blood Panel PCR results on this specimen are available    approximately 3 hours after the Gram stain result.***    Gram stain, PCR, and/or culture results may not always    correspond due to differencein methodologies.    ************************************************************    This PCR assay was performed using [employer].    The following targets are tested for: Enterococcus,    vancomycin resistant enterococci, Listeria monocytogenes,    coagulase negative staphylococci, S. aureus,    methicillin resistant S. aureus, Streptococcus agalactiae    (Group B), S. pneumoniae, S. pyogenes (Group A),    Acinetobacter baumannii, Enterobacter cloacae, E. coli,    Klebsiella oxytoca, K. pneumoniae, Proteus sp.,    Serratia marcescens, Haemophilus influenzae,    Neisseria meningitidis, Pseudomonas aeruginosa, Candida    albicans, C. glabrata, C krusei, C parapsilosis,    C. tropicalis and the KPC resistance gene.    "Due to technical problems, Proteus sp. will Not be reported as part of    the BCID panel until further notice"  Organism: Blood Culture PCR  Pseudomonas aeruginosa (20 @ 14:32)  Organism: Pseudomonas aeruginosa (20 @ 14:32)  Organism: Blood Culture PCR (20 @ 14:32)    Culture - Blood (collected 20 @ 16:38)  Source: .Blood Blood-Peripheral  Gram Stain (20 @ 04:20):    Growth in aerobic bottle: Gram Negative Rods  Preliminary Report (20 @ 04:20):    Growth in aerobic bottle: Gram Negative Rods                  Review of systems  Gen: No weight changes, fatigue, fevers/chills  Skin: No rashes  Head/Eyes/Ears/Mouth: No headache; Normal hearing; Normal vision w/o blurriness; No sinus pain/discomfort, sore throat  Respiratory: No dyspnea, cough, wheezing, hemoptysis  CV: No chest pain, PND, orthopnea  GI: Denies abdominal pain, diarrhea, constipation, nausea, vomiting, melena, hematochezia  : No increased frequency, dysuria, hematuria, nocturia. PCNU open to drain  MSK: No joint pain/swelling; no back pain; no edema  Neuro: No dizziness/lightheadedness, weakness, seizures, numbness, tingling  Heme: No easy bruising or bleeding  Endo: No heat/cold intolerance  Psych: No significant nervousness, anxiety, stress, depression  All other systems were reviewed and are negative, except as noted.      PHYSICAL EXAM:  Constitutional: Well developed / well nourished  Eyes: Anicteric, PERRLA  ENMT: nc/at  Neck: supple  Respiratory: CTA B/L  Cardiovascular: RRR  Gastrointestinal: Soft abdomen, NT, ND  Genitourinary: PCNU open to drain, clear yellow urine  Extremities: SCD's in place and working bilaterally, No edema  Vascular: Palpable dp pulses bilaterally  Neurological: A&O x3  Skin: wound well healed  Musculoskeletal: Moving all extremities  Psychiatric: Responsive Transplant Surgery - Multidisciplinary Rounds  --------------------------------------------------------------  DDRT HCV+    Date: 2018       Admitted 2020 with UTI    Present:   Patient seen and examined with multidisciplinary team including Transplant Surgeon: Dr. Yao. Transplant Nephrologist: Dr. CHAYO Bhakta.  Pharmacist: Abhishek Harrell. NP: Roxann Jorgensen/MATY Harp.  Disciplines not in attendance will be notified of the plan.     HPI: 68 y/o/M with a PMH of ESRD 2/2 bilateral nephrectomy from neoplasm, hypertension, and NIDDM.  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 h/o CMV viremia.     Re-admitted in 2019 with E coli bacteremia/urosepsis; treated w/ IV vanc/cefepime/zosyn.   Re-admitted in 2019 with severe CHELA 2/2 FK toxicity (level on admission was 39), +UA, was treated w/ cefepime.   Re-admitted in 2019 w/ uptrending Cr on outpatient labs (3.4), was  treated with pulse steroid for ACR 1B.   Re-admitted in 2020 with CHELA and hydronephrosis, PCN placed  by IR, which was converted to PCNU on .     Presented to ED with fever and generalized weakness past 3 days. Denies dysuria, hematuria, has some frequency on urination. Recently tx for UTI with keflex, completed abx a week ago. Urine cx on 6/15 coag neg staph, hx of E coli UTI in the past.  In ED: Febrile 38.6, , /60. Labs significant for elevated creat  ~6 (baseline ~2). Recently had ureteral stent placed on ; PCN in place (capped). Non contrast CT a/p in ED non specific. CXR clear. Awaiting for renal doppler. Denies respiratory symptoms. Denies abdominal pain. Received Cefepime 2g in ED with NS bolus 2L.     BK PCR neg on 6/15   CMV PCR detected 6/15, neg on     Interval Events:    Afebrile on Cefepime, VS stable  UO ~2L via PCN  Cr trending down appropriately  No other complaints or issues at this time    Potential Discharge date: pending clinical improvement    Education:  Medications    Plan of care:  See Below      MEDICATIONS  (STANDING):  allopurinol 100 milliGRAM(s) Oral daily  cefepime   IVPB 1000 milliGRAM(s) IV Intermittent every 12 hours  citric acid/sodium citrate Solution 15 milliLiter(s) Oral three times a day  dextrose 5%. 1000 milliLiter(s) (50 mL/Hr) IV Continuous <Continuous>  dextrose 50% Injectable 12.5 Gram(s) IV Push once  dextrose 50% Injectable 25 Gram(s) IV Push once  dextrose 50% Injectable 25 Gram(s) IV Push once  famotidine    Tablet 20 milliGRAM(s) Oral daily  gabapentin 300 milliGRAM(s) Oral daily  insulin lispro (HumaLOG) corrective regimen sliding scale   SubCutaneous at bedtime  insulin lispro (HumaLOG) corrective regimen sliding scale   SubCutaneous three times a day before meals  insulin lispro (HumaLOG) corrective regimen sliding scale   SubCutaneous at bedtime  metoprolol succinate  milliGRAM(s) Oral daily  NIFEdipine XL 30 milliGRAM(s) Oral daily  predniSONE   Tablet 5 milliGRAM(s) Oral daily  tacrolimus ER Tablet (ENVARSUS XR) 3 milliGRAM(s) Oral <User Schedule>  tamsulosin 0.4 milliGRAM(s) Oral at bedtime  trimethoprim   80 mG/sulfamethoxazole 400 mG 1 Tablet(s) Oral daily    MEDICATIONS  (PRN):  dextrose 40% Gel 15 Gram(s) Oral once PRN Blood Glucose LESS THAN 70 milliGRAM(s)/deciliter  glucagon  Injectable 1 milliGRAM(s) IntraMuscular once PRN Glucose LESS THAN 70 milligrams/deciliter      PAST MEDICAL & SURGICAL HISTORY:  Medial meniscus tear: &#x27; 90&#x27;s  Right  Bowel obstruction: &#x27; 2017   surgically treated  Anal fissure: dx: &#x27; 2018   No surgery  Benign prostatic hypertrophy  Hepatitis C: In Donor Kidney: patient received treatment for Hep. C : post treatment: patient  tested Negative for Hep C  Kidney neoplasm: dx: &#x27; 2015 : Left      s/p bilateral Nephrectomy &#x27; 2015  ESRD (end stage renal disease): On Dialysis &#x27;  to 2018  Type 2 diabetes mellitus: dx: &#x27;88  HTN (hypertension)  Kidney transplant recipient: 2018  @ [facility] :  +  Hep C Donor  S/P right knee arthroscopy: &#x27; 90&#x27;s  H/O ileostomy: &#x27; 2017   for 3 months. 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      Vital Signs Last 24 Hrs  T(C): 36.7 (2020 18:00), Max: 36.9 (2020 01:00)  T(F): 98.1 (2020 18:00), Max: 98.5 (2020 05:40)  HR: 70 (2020 18:00) (70 - 93)  BP: 140/64 (2020 18:00) (126/62 - 140/64)  BP(mean): 91 (2020 05:40) (91 - 91)  RR: 18 (2020 18:00) (18 - 19)  SpO2: 98% (2020 18:00) (97% - 100%)    I&O's Summary    2020 07:  -  2020 07:00  --------------------------------------------------------  IN: 2350 mL / OUT: 2915 mL / NET: -565 mL    2020 07:  -  2020 21:02  --------------------------------------------------------  IN: 570 mL / OUT: 1600 mL / NET: -1030 mL                              8.8    2.68  )-----------( 135      ( 2020 06:08 )             28.1     07-08    134<L>  |  106  |  50<H>  ----------------------------<  186<H>  4.2   |  14<L>  |  3.62<H>    Ca    8.8      2020 06:07  Phos  2.7     07-08  Mg     2.0     07-08    TPro  6.0  /  Alb  3.2<L>  /  TBili  0.2  /  DBili  x   /  AST  38  /  ALT  34  /  AlkPhos  80  07-08    Tacrolimus (), Serum: 12.9 ng/mL ( @ 09:19)        Culture - Urine (collected 20 @ 19:39)  Source: .Urine Clean Catch (Midstream)  Final Report (20 @ 17:41):    10,000 - 49,000 CFU/mL Pseudomonas aeruginosa (Carbapenem Resistant)  Organism: Pseudomonas aeruginosa (Carbapenem Resistant) (20 @ 17:42)  Organism: Pseudomonas aeruginosa (Carbapenem Resistant) (20 @ 17:42)    Culture - Blood (collected 20 @ 16:38)  Source: .Blood Blood-Peripheral  Gram Stain (20 @ 01:19):    Growth in aerobic bottle: Gram Negative Rods  Final Report (20 @ 14:32):    Growth in aerobic bottle: Pseudomonas aeruginosa    ***Blood Panel PCR results on this specimen are available    approximately 3 hours after the Gram stain result.***    Gram stain, PCR, and/or culture results may not always    correspond due to differencein methodologies.    ************************************************************    This PCR assay was performed using [employer].    The following targets are tested for: Enterococcus,    vancomycin resistant enterococci, Listeria monocytogenes,    coagulase negative staphylococci, S. aureus,    methicillin resistant S. aureus, Streptococcus agalactiae    (Group B), S. pneumoniae, S. pyogenes (Group A),    Acinetobacter baumannii, Enterobacter cloacae, E. coli,    Klebsiella oxytoca, K. pneumoniae, Proteus sp.,    Serratia marcescens, Haemophilus influenzae,    Neisseria meningitidis, Pseudomonas aeruginosa, Candida    albicans, C. glabrata, C krusei, C parapsilosis,    C. tropicalis and the KPC resistance gene.    "Due to technical problems, Proteus sp. will Not be reported as part of    the BCID panel until further notice"  Organism: Blood Culture PCR  Pseudomonas aeruginosa (20 @ 14:32)  Organism: Pseudomonas aeruginosa (20 @ 14:32)  Organism: Blood Culture PCR (20 @ 14:32)    Culture - Blood (collected 20 @ 16:38)  Source: .Blood Blood-Peripheral  Gram Stain (20 @ 04:20):    Growth in aerobic bottle: Gram Negative Rods  Preliminary Report (20 @ 04:20):    Growth in aerobic bottle: Gram Negative Rods                  Review of systems  Gen: No weight changes, fatigue, fevers/chills  Skin: No rashes  Head/Eyes/Ears/Mouth: No headache; Normal hearing; Normal vision w/o blurriness; No sinus pain/discomfort, sore throat  Respiratory: No dyspnea, cough, wheezing, hemoptysis  CV: No chest pain, PND, orthopnea  GI: Denies abdominal pain, diarrhea, constipation, nausea, vomiting, melena, hematochezia  : No increased frequency, dysuria, hematuria, nocturia. PCNU open to drain  MSK: No joint pain/swelling; no back pain; no edema  Neuro: No dizziness/lightheadedness, weakness, seizures, numbness, tingling  Heme: No easy bruising or bleeding  Endo: No heat/cold intolerance  Psych: No significant nervousness, anxiety, stress, depression  All other systems were reviewed and are negative, except as noted.      PHYSICAL EXAM:  Constitutional: Well developed / well nourished  Eyes: Anicteric, PERRLA  ENMT: nc/at  Neck: supple  Respiratory: CTA B/L  Cardiovascular: RRR  Gastrointestinal: Soft abdomen, NT, ND  Genitourinary: PCNU open to drain, clear yellow urine  Extremities: SCD's in place and working bilaterally, No edema  Vascular: Palpable dp pulses bilaterally  Neurological: A&O x3  Skin: wound well healed  Musculoskeletal: Moving all extremities  Psychiatric: Responsive

## 2020-07-08 NOTE — PROVIDER CONTACT NOTE (CRITICAL VALUE NOTIFICATION) - SITUATION
PA notified of critical lab result. blood culture (aerobic bottle)-Gram (-) rods  urine culture (10,000-49,000) pseudomonas aeruginosa-carbapenem resistant

## 2020-07-09 ENCOUNTER — TRANSCRIPTION ENCOUNTER (OUTPATIENT)
Age: 70
End: 2020-07-09

## 2020-07-09 LAB
ALBUMIN SERPL ELPH-MCNC: 3.1 G/DL — LOW (ref 3.3–5)
ALP SERPL-CCNC: 76 U/L — SIGNIFICANT CHANGE UP (ref 40–120)
ALT FLD-CCNC: 32 U/L — SIGNIFICANT CHANGE UP (ref 10–45)
ANION GAP SERPL CALC-SCNC: 13 MMOL/L — SIGNIFICANT CHANGE UP (ref 5–17)
AST SERPL-CCNC: 29 U/L — SIGNIFICANT CHANGE UP (ref 10–40)
BASOPHILS # BLD AUTO: 0.03 K/UL — SIGNIFICANT CHANGE UP (ref 0–0.2)
BASOPHILS NFR BLD AUTO: 1 % — SIGNIFICANT CHANGE UP (ref 0–2)
BILIRUB SERPL-MCNC: 0.2 MG/DL — SIGNIFICANT CHANGE UP (ref 0.2–1.2)
BUN SERPL-MCNC: 42 MG/DL — HIGH (ref 7–23)
CALCIUM SERPL-MCNC: 9 MG/DL — SIGNIFICANT CHANGE UP (ref 8.4–10.5)
CHLORIDE SERPL-SCNC: 108 MMOL/L — SIGNIFICANT CHANGE UP (ref 96–108)
CO2 SERPL-SCNC: 15 MMOL/L — LOW (ref 22–31)
CREAT SERPL-MCNC: 2.81 MG/DL — HIGH (ref 0.5–1.3)
CULTURE RESULTS: SIGNIFICANT CHANGE UP
EOSINOPHIL # BLD AUTO: 0.11 K/UL — SIGNIFICANT CHANGE UP (ref 0–0.5)
EOSINOPHIL NFR BLD AUTO: 3.9 % — SIGNIFICANT CHANGE UP (ref 0–6)
GLUCOSE BLDC GLUCOMTR-MCNC: 179 MG/DL — HIGH (ref 70–99)
GLUCOSE BLDC GLUCOMTR-MCNC: 189 MG/DL — HIGH (ref 70–99)
GLUCOSE BLDC GLUCOMTR-MCNC: 220 MG/DL — HIGH (ref 70–99)
GLUCOSE BLDC GLUCOMTR-MCNC: 243 MG/DL — HIGH (ref 70–99)
GLUCOSE SERPL-MCNC: 192 MG/DL — HIGH (ref 70–99)
HCT VFR BLD CALC: 28.4 % — LOW (ref 39–50)
HGB BLD-MCNC: 8.8 G/DL — LOW (ref 13–17)
LYMPHOCYTES # BLD AUTO: 1 K/UL — SIGNIFICANT CHANGE UP (ref 1–3.3)
LYMPHOCYTES # BLD AUTO: 34.3 % — SIGNIFICANT CHANGE UP (ref 13–44)
MAGNESIUM SERPL-MCNC: 1.9 MG/DL — SIGNIFICANT CHANGE UP (ref 1.6–2.6)
MCHC RBC-ENTMCNC: 30.3 PG — SIGNIFICANT CHANGE UP (ref 27–34)
MCHC RBC-ENTMCNC: 31 GM/DL — LOW (ref 32–36)
MCV RBC AUTO: 97.9 FL — SIGNIFICANT CHANGE UP (ref 80–100)
MONOCYTES # BLD AUTO: 0.34 K/UL — SIGNIFICANT CHANGE UP (ref 0–0.9)
MONOCYTES NFR BLD AUTO: 11.8 % — SIGNIFICANT CHANGE UP (ref 2–14)
NEUTROPHILS # BLD AUTO: 1.31 K/UL — LOW (ref 1.8–7.4)
NEUTROPHILS NFR BLD AUTO: 45.1 % — SIGNIFICANT CHANGE UP (ref 43–77)
PHOSPHATE SERPL-MCNC: 2.4 MG/DL — LOW (ref 2.5–4.5)
PLATELET # BLD AUTO: 159 K/UL — SIGNIFICANT CHANGE UP (ref 150–400)
POTASSIUM SERPL-MCNC: 4.5 MMOL/L — SIGNIFICANT CHANGE UP (ref 3.5–5.3)
POTASSIUM SERPL-SCNC: 4.5 MMOL/L — SIGNIFICANT CHANGE UP (ref 3.5–5.3)
PROT SERPL-MCNC: 6.4 G/DL — SIGNIFICANT CHANGE UP (ref 6–8.3)
RBC # BLD: 2.9 M/UL — LOW (ref 4.2–5.8)
RBC # FLD: 14.1 % — SIGNIFICANT CHANGE UP (ref 10.3–14.5)
SODIUM SERPL-SCNC: 136 MMOL/L — SIGNIFICANT CHANGE UP (ref 135–145)
SPECIMEN SOURCE: SIGNIFICANT CHANGE UP
TACROLIMUS SERPL-MCNC: 9.3 NG/ML — SIGNIFICANT CHANGE UP
WBC # BLD: 2.91 K/UL — LOW (ref 3.8–10.5)
WBC # FLD AUTO: 2.91 K/UL — LOW (ref 3.8–10.5)

## 2020-07-09 PROCEDURE — 99232 SBSQ HOSP IP/OBS MODERATE 35: CPT | Mod: GC

## 2020-07-09 PROCEDURE — 99232 SBSQ HOSP IP/OBS MODERATE 35: CPT

## 2020-07-09 RX ORDER — TACROLIMUS 4 MG/1
4 TABLET, EXTENDED RELEASE ORAL DAILY
Qty: 38 | Refills: 5 | Status: DISCONTINUED | COMMUNITY
Start: 2019-09-12 | End: 2020-07-09

## 2020-07-09 RX ORDER — CIPROFLOXACIN LACTATE 400MG/40ML
250 VIAL (ML) INTRAVENOUS
Refills: 0 | Status: DISCONTINUED | OUTPATIENT
Start: 2020-07-09 | End: 2020-07-10

## 2020-07-09 RX ADMIN — CEFEPIME 100 MILLIGRAM(S): 1 INJECTION, POWDER, FOR SOLUTION INTRAMUSCULAR; INTRAVENOUS at 05:52

## 2020-07-09 RX ADMIN — Medication 4: at 09:14

## 2020-07-09 RX ADMIN — Medication 15 MILLILITER(S): at 05:52

## 2020-07-09 RX ADMIN — Medication 1 TABLET(S): at 12:30

## 2020-07-09 RX ADMIN — TAMSULOSIN HYDROCHLORIDE 0.4 MILLIGRAM(S): 0.4 CAPSULE ORAL at 22:16

## 2020-07-09 RX ADMIN — Medication 15 MILLILITER(S): at 13:36

## 2020-07-09 RX ADMIN — Medication 250 MILLIGRAM(S): at 18:57

## 2020-07-09 RX ADMIN — Medication 4: at 18:58

## 2020-07-09 RX ADMIN — Medication 2: at 13:36

## 2020-07-09 RX ADMIN — Medication 15 MILLILITER(S): at 22:16

## 2020-07-09 RX ADMIN — FAMOTIDINE 20 MILLIGRAM(S): 10 INJECTION INTRAVENOUS at 12:30

## 2020-07-09 RX ADMIN — Medication 5 MILLIGRAM(S): at 05:52

## 2020-07-09 RX ADMIN — GABAPENTIN 300 MILLIGRAM(S): 400 CAPSULE ORAL at 12:30

## 2020-07-09 RX ADMIN — CEFEPIME 100 MILLIGRAM(S): 1 INJECTION, POWDER, FOR SOLUTION INTRAMUSCULAR; INTRAVENOUS at 18:57

## 2020-07-09 RX ADMIN — Medication 100 MILLIGRAM(S): at 12:31

## 2020-07-09 NOTE — DISCHARGE NOTE PROVIDER - NSDCCPCAREPLAN_GEN_ALL_CORE_FT
PRINCIPAL DISCHARGE DIAGNOSIS  Diagnosis: Acute UTI  Assessment and Plan of Treatment:   HO ME CARE INSTRUCTIONS  If you were prescribed antibiotics, take them exactly as your caregiver instructs you. Finish the medication even if you feel better after you have only taken some of the medication.  Drink enough water and fluids to keep your urine clear or pale yellow.  Avoid caffeine, tea, and carbonated beverages. They tend to irritate your bladder.  Empty your bladder often. Avoid holding urine for long periods of time.  Empty your bladder before and after sexual intercourse.  After a bowel movement, women should cleanse from front to back. Use each tissue only once.  SEEK MEDICAL CARE IF:  You have back pain.  You develop a fever.  Your symptoms do not begin to resolve within 3 days.  SEEK IMMEDIATE MEDICAL CARE IF:  You have severe back pain or lower abdominal pain.  You develop chills.  You have nausea or vomiting.  You have continued burning or discomfort with urination.      SECONDARY DISCHARGE DIAGNOSES  Diagnosis: Fever  Assessment and Plan of Treatment: Take all antibiotics as ordered.  Call you Health care provider upon arrival home to make a one week follow up appointment.  If you develop fever, chills, malaise, or change in mental status call your Health Care Provider or go to the Emergency Department.  Nutrition is important, eat small frequent meals to help ensure you get adequate calories.  Do not stay in bed all day!  Increase your activity daily as tolerated    Diagnosis: Acute UTI  Assessment and Plan of Treatment:

## 2020-07-09 NOTE — PROGRESS NOTE ADULT - SUBJECTIVE AND OBJECTIVE BOX
[facility] DIVISION OF KIDNEY DISEASES AND HYPERTENSION -- FOLLOW UP NOTE  --------------------------------------------------------------------------------  Chief Complaint:  pyelomartita    24 hour events/subjective:  Pt feels ok      PAST HISTORY  --------------------------------------------------------------------------------  No significant changes to PMH, PSH, FHx, SHx, unless otherwise noted    ALLERGIES & MEDICATIONS  --------------------------------------------------------------------------------  Allergies    No Known Allergies    Intolerances      Standing Inpatient Medications  allopurinol 100 milliGRAM(s) Oral daily  cefepime   IVPB 1000 milliGRAM(s) IV Intermittent every 12 hours  ciprofloxacin     Tablet 250 milliGRAM(s) Oral two times a day  citric acid/sodium citrate Solution 15 milliLiter(s) Oral three times a day  dextrose 5%. 1000 milliLiter(s) IV Continuous <Continuous>  dextrose 50% Injectable 12.5 Gram(s) IV Push once  dextrose 50% Injectable 25 Gram(s) IV Push once  dextrose 50% Injectable 25 Gram(s) IV Push once  famotidine    Tablet 20 milliGRAM(s) Oral daily  gabapentin 300 milliGRAM(s) Oral daily  insulin lispro (HumaLOG) corrective regimen sliding scale   SubCutaneous at bedtime  insulin lispro (HumaLOG) corrective regimen sliding scale   SubCutaneous three times a day before meals  insulin lispro (HumaLOG) corrective regimen sliding scale   SubCutaneous at bedtime  metoprolol succinate  milliGRAM(s) Oral daily  NIFEdipine XL 30 milliGRAM(s) Oral daily  predniSONE   Tablet 5 milliGRAM(s) Oral daily  tacrolimus ER Tablet (ENVARSUS XR) 3 milliGRAM(s) Oral <User Schedule>  tamsulosin 0.4 milliGRAM(s) Oral at bedtime  trimethoprim   80 mG/sulfamethoxazole 400 mG 1 Tablet(s) Oral daily    PRN Inpatient Medications  dextrose 40% Gel 15 Gram(s) Oral once PRN  glucagon  Injectable 1 milliGRAM(s) IntraMuscular once PRN      REVIEW OF SYSTEMS  --------------------------------------------------------------------------------  Gen: No fatigue, fevers/chills, weakness  Skin: No rashes  Head/Eyes/Ears/Mouth: No headache;No sore throat  Respiratory: No dyspnea, cough,   CV: No chest pain, PND, orthopnea  GI: No abdominal pain, diarrhea, constipation, nausea, vomiting  Transplant: No pain  : No increased frequency, dysuria, hematuria, nocturia  MSK: No joint pain/swelling; no back pain; no edema  Neuro: No dizziness/lightheadedness, weakness, seizures, numbness, tingling  Psych: No significant nervousness, anxiety, stress, depression    All other systems were reviewed and are negative, except as noted.    VITALS/PHYSICAL EXAM  --------------------------------------------------------------------------------  T(C): 36.9 (07-09-20 @ 13:00), Max: 37 (07-09-20 @ 05:46)  HR: 70 (07-09-20 @ 13:00) (70 - 76)  BP: 147/69 (07-09-20 @ 13:00) (131/64 - 155/77)  RR: 18 (07-09-20 @ 13:00) (18 - 18)  SpO2: 100% (07-09-20 @ 13:00) (98% - 100%)  Wt(kg): --        07-08-20 @ 07:01  -  07-09-20 @ 07:00  --------------------------------------------------------  IN: 810 mL / OUT: 2200 mL / NET: -1390 mL    07-09-20 @ 07:01  -  07-09-20 @ 13:15  --------------------------------------------------------  IN: 0 mL / OUT: 500 mL / NET: -500 mL      Physical Exam:  	Gen: NAD  	HEENT: PERRL, supple neck, clear oropharynx  	Pulm: CTA B/L  	CV: RRR, S1S2; no rub  	Back: No spinal or CVA tenderness; no sacral edema  	Abd: +BS, soft, nontender/nondistended                      Transplant: No tenderness, swelling, nephrostomy tube in place.   	: No suprapubic tenderness  	UE: Warm, FROM; no edema; no asterixis  	LE: Warm, FROM; no edema  	Neuro: No focal deficits  	Psych: Normal affect and mood  	Skin: Warm, without rashes      LABS/STUDIES  --------------------------------------------------------------------------------              8.8    2.91  >-----------<  159      [07-09-20 @ 06:07]              28.4     136  |  108  |  42  ----------------------------<  192      [07-09-20 @ 06:07]  4.5   |  15  |  2.81        Ca     9.0     [07-09-20 @ 06:07]      Mg     1.9     [07-09-20 @ 06:07]      Phos  2.4     [07-09-20 @ 06:07]    TPro  6.4  /  Alb  3.1  /  TBili  0.2  /  DBili  x   /  AST  29  /  ALT  32  /  AlkPhos  76  [07-09-20 @ 06:07]          Creatinine Trend:  SCr 2.81 [07-09 @ 06:07]  SCr 3.62 [07-08 @ 06:07]  SCr 5.05 [07-07 @ 05:55]  SCr 5.45 [07-06 @ 15:44]  SCr 6.00 [07-06 @ 06:21]    Tacrolimus (), Serum: 9.3 ng/mL (07-09 @ 09:22)  Tacrolimus (), Serum: 12.9 ng/mL (07-08 @ 09:19)  Tacrolimus (), Serum: 13.2 ng/mL (07-07 @ 08:17)  Tacrolimus (), Serum: 6.7 ng/mL (07-06 @ 08:22)            Urinalysis - [07-05-20 @ 16:13]      Color Yellow / Appearance Slightly Turbid / SG 1.023 / pH 6.0      Gluc Negative / Ketone Negative  / Bili Negative / Urobili Negative       Blood Small / Protein 100 mg/dL / Leuk Est Large / Nitrite Negative      RBC 2 / WBC 42 / Hyaline 8 / Gran  / Sq Epi  / Non Sq Epi 8 / Bacteria Few      HbA1c 6.0      [08-09-19 @ 23:48]

## 2020-07-09 NOTE — PROGRESS NOTE ADULT - SUBJECTIVE AND OBJECTIVE BOX
Transplant Surgery - Multidisciplinary Rounds  --------------------------------------------------------------  DDRT HCV+    Date: 2018       Admitted 2020 with UTI    Present:   Patient seen and examined with multidisciplinary team including Transplant Surgeon: Dr. Yao. Transplant Nephrologist: Dr. CHAYO Bhakta.  Pharmacist: Abhishek Harrell. NP: MATY Mosley.  and surgical resident Matt Gonzalez Disciplines not in attendance will be notified of the plan.     HPI: 68 y/o/M with a PMH of ESRD 2/2 bilateral nephrectomy from neoplasm, hypertension, and NIDDM.  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 h/o CMV viremia.     Re-admitted in 2019 with E coli bacteremia/urosepsis; treated w/ IV vanc/cefepime/zosyn.   Re-admitted in 2019 with severe CEHLA 2/2 FK toxicity (level on admission was 39), +UA, was treated w/ cefepime.   Re-admitted in 2019 w/ uptrending Cr on outpatient labs (3.4), was  treated with pulse steroid for ACR 1B.   Re-admitted in 2020 with CHELA and hydronephrosis, PCN placed  by IR, which was converted to PCNU on .     Presented to ED with fever and generalized weakness past 3 days. Denies dysuria, hematuria, has some frequency on urination. Recently tx for UTI with keflex, completed abx a week ago. Urine cx on 6/15 coag neg staph, hx of E coli UTI in the past.  In ED: Febrile 38.6, , /60. Labs significant for elevated creat  ~6 (baseline ~2). Recently had ureteral stent placed on ; PCN in place (capped). Non contrast CT a/p in ED non specific. CXR clear. Awaiting for renal doppler. Denies respiratory symptoms. Denies abdominal pain. Received Cefepime 2g in ED with NS bolus 2L.     BK PCR neg on 6/15   CMV PCR detected 6/15, neg on     Interval Events:    Remains afebrile on Cefepime, Urine cult pos carbapenem resistant pseudomonas, sensitive to cefepime and cipro  UO ~2L via PCN  Cr trending down appropriately  No other complaints or issues at this time    Potential Discharge date: pending clinical improvement    Education:  Medications    Plan of care:  See Below      MEDICATIONS  (STANDING):  allopurinol 100 milliGRAM(s) Oral daily  cefepime   IVPB 1000 milliGRAM(s) IV Intermittent every 12 hours  ciprofloxacin     Tablet 250 milliGRAM(s) Oral two times a day  citric acid/sodium citrate Solution 15 milliLiter(s) Oral three times a day  dextrose 5%. 1000 milliLiter(s) (50 mL/Hr) IV Continuous <Continuous>  dextrose 50% Injectable 12.5 Gram(s) IV Push once  dextrose 50% Injectable 25 Gram(s) IV Push once  dextrose 50% Injectable 25 Gram(s) IV Push once  famotidine    Tablet 20 milliGRAM(s) Oral daily  gabapentin 300 milliGRAM(s) Oral daily  insulin lispro (HumaLOG) corrective regimen sliding scale   SubCutaneous at bedtime  insulin lispro (HumaLOG) corrective regimen sliding scale   SubCutaneous three times a day before meals  insulin lispro (HumaLOG) corrective regimen sliding scale   SubCutaneous at bedtime  metoprolol succinate  milliGRAM(s) Oral daily  NIFEdipine XL 30 milliGRAM(s) Oral daily  predniSONE   Tablet 5 milliGRAM(s) Oral daily  tacrolimus ER Tablet (ENVARSUS XR) 3 milliGRAM(s) Oral <User Schedule>  tamsulosin 0.4 milliGRAM(s) Oral at bedtime  trimethoprim   80 mG/sulfamethoxazole 400 mG 1 Tablet(s) Oral daily    MEDICATIONS  (PRN):  dextrose 40% Gel 15 Gram(s) Oral once PRN Blood Glucose LESS THAN 70 milliGRAM(s)/deciliter  glucagon  Injectable 1 milliGRAM(s) IntraMuscular once PRN Glucose LESS THAN 70 milligrams/deciliter      PAST MEDICAL & SURGICAL HISTORY:  Medial meniscus tear: &#x27; &#x27;s  Right  Bowel obstruction: &#x27; 2017   surgically treated  Anal fissure: dx: &#x27; 2018   No surgery  Benign prostatic hypertrophy  Hepatitis C: In Donor Kidney: patient received treatment for Hep. 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      Vital Signs Last 24 Hrs  T(C): 36.8 (2020 09:00), Max: 37 (2020 05:46)  T(F): 98.3 (2020 09:00), Max: 98.6 (2020 05:46)  HR: 74 (2020 09:00) (70 - 76)  BP: 155/77 (2020 09:00) (131/64 - 155/77)  BP(mean): 107 (2020 01:03) (91 - 107)  RR: 18 (2020 09:00) (18 - 18)  SpO2: 98% (2020 09:00) (98% - 100%)    I&O's Summary    2020 07:01  -  2020 07:00  --------------------------------------------------------  IN: 810 mL / OUT: 2200 mL / NET: -1390 mL          LABS:                        8.8    2.91  )-----------( 159      ( 2020 06:07 )             28.4     07-09    136  |  108  |  42<H>  ----------------------------<  192<H>  4.5   |  15<L>  |  2.81<H>    Ca    9.0      2020 06:07  Phos  2.4       Mg     1.9         TPro  6.4  /  Alb  3.1<L>  /  TBili  0.2  /  DBili  x   /  AST  29  /  ALT  32  /  AlkPhos  76          CAPILLARY BLOOD GLUCOSE      POCT Blood Glucose.: 220 mg/dL (2020 08:54)  POCT Blood Glucose.: 209 mg/dL (2020 21:35)  POCT Blood Glucose.: 250 mg/dL (2020 18:37)  POCT Blood Glucose.: 259 mg/dL (2020 13:10)    LIVER FUNCTIONS - ( 2020 06:07 )  Alb: 3.1 g/dL / Pro: 6.4 g/dL / ALK PHOS: 76 U/L / ALT: 32 U/L / AST: 29 U/L / GGT: x             Culture - Urine (collected 20 @ 19:39)  Source: .Urine Clean Catch (Midstream)  Final Report (20 @ 17:41):    10,000 - 49,000 CFU/mL Pseudomonas aeruginosa (Carbapenem Resistant)  Organism: Pseudomonas aeruginosa (Carbapenem Resistant) (20 @ 17:42)  Organism: Pseudomonas aeruginosa (Carbapenem Resistant) (20 @ 17:42)    Culture - Blood (collected 20 @ 16:38)  Source: .Blood Blood-Peripheral  Gram Stain (20 @ 01:19):    Growth in aerobic bottle: Gram Negative Rods  Final Report (20 @ 14:32):    Growth in aerobic bottle: Pseudomonas aeruginosa    ***Blood Panel PCR results on this specimen are available    approximately 3 hours after the Gram stain result.***    Gram stain, PCR, and/or culture results may not always    correspond due to differencein methodologies.    ************************************************************    This PCR assay was performed using [employer].    The following targets are tested for: Enterococcus,    vancomycin resistant enterococci, Listeria monocytogenes,    coagulase negative staphylococci, S. aureus,    methicillin resistant S. aureus, Streptococcus agalactiae    (Group B), S. pneumoniae, S. pyogenes (Group A),    Acinetobacter baumannii, Enterobacter cloacae, E. coli,    Klebsiella oxytoca, K. pneumoniae, Proteus sp.,    Serratia marcescens, Haemophilus influenzae,    Neisseria meningitidis, Pseudomonas aeruginosa, Candida    albicans, C. glabrata, C krusei, C parapsilosis,    C. tropicalis and the KPC resistance gene.    "Due to technical problems, Proteus sp. will Not be reported as part of    the BCID panel until further notice"  Organism: Blood Culture PCR  Pseudomonas aeruginosa (20 @ 14:32)  Organism: Pseudomonas aeruginosa (20 @ 14:32)  Organism: Blood Culture PCR (20 @ 14:32)    Culture - Blood (collected 20 @ 16:38)  Source: .Blood Blood-Peripheral  Gram Stain (20 @ 04:20):    Growth in aerobic bottle: Gram Negative Rods  Preliminary Report (20 @ 04:20):    Growth in aerobic bottle: Gram Negative Rods      Tacrolimus (), Serum: 12.9 ng/mL ( @ 09:19)      Cultures:    Culture - Urine (collected 20 @ 19:39)  Source: .Urine Clean Catch (Midstream)  Final Report (20 @ 17:41):    10,000 - 49,000 CFU/mL Pseudomonas aeruginosa (Carbapenem Resistant)  Organism: Pseudomonas aeruginosa (Carbapenem Resistant) (20 @ 17:42)  Organism: Pseudomonas aeruginosa (Carbapenem Resistant) (20 @ 17:42)    Culture - Blood (collected 20 @ 16:38)  Source: .Blood Blood-Peripheral  Gram Stain (20 @ 01:19):    Growth in aerobic bottle: Gram Negative Rods  Final Report (20 @ 14:32):    Growth in aerobic bottle: Pseudomonas aeruginosa    ***Blood Panel PCR results on this specimen are available    approximately 3 hours after the Gram stain result.***    Gram stain, PCR, and/or culture results may not always    correspond due to differencein methodologies.    ************************************************************    This PCR assay was performed using [employer].    The following targets are tested for: Enterococcus,    vancomycin resistant enterococci, Listeria monocytogenes,    coagulase negative staphylococci, S. aureus,    methicillin resistant S. aureus, Streptococcus agalactiae    (Group B), S. pneumoniae, S. pyogenes (Group A),    Acinetobacter baumannii, Enterobacter cloacae, E. coli,    Klebsiella oxytoca, K. pneumoniae, Proteus sp.,    Serratia marcescens, Haemophilus influenzae,    Neisseria meningitidis, Pseudomonas aeruginosa, Candida    albicans, C. glabrata, C krusei, C parapsilosis,    C. tropicalis and the KPC resistance gene.    "Due to technical problems, Proteus sp. will Not be reported as part of    the BCID panel until further notice"  Organism: Blood Culture PCR  Pseudomonas aeruginosa (20 @ 14:32)  Organism: Pseudomonas aeruginosa (20 @ 14:32)  Organism: Blood Culture PCR (20 @ 14:32)    Culture - Blood (collected 20 @ 16:38)  Source: .Blood Blood-Peripheral  Gram Stain (20 @ 04:20):    Growth in aerobic bottle: Gram Negative Rods  Preliminary Report (20 @ 04:20):    Growth in aerobic bottle: Gram Negative Rods        Review of systems  Gen: No weight changes, fatigue, fevers/chills  Skin: No rashes  Head/Eyes/Ears/Mouth: No headache; Normal hearing; Normal vision w/o blurriness; No sinus pain/discomfort, sore throat  Respiratory: No dyspnea, cough, wheezing, hemoptysis  CV: No chest pain, PND, orthopnea  GI: Denies abdominal pain, diarrhea, constipation, nausea, vomiting, melena, hematochezia  : No increased frequency, dysuria, hematuria, nocturia. PCNU open to drain  MSK: No joint pain/swelling; no back pain; no edema  Neuro: No dizziness/lightheadedness, weakness, seizures, numbness, tingling  Heme: No easy bruising or bleeding  Endo: No heat/cold intolerance  Psych: No significant nervousness, anxiety, stress, depression  All other systems were reviewed and are negative, except as noted.      PHYSICAL EXAM:  Constitutional: Well developed / well nourished  Eyes: Anicteric, PERRLA  ENMT: nc/at  Neck: supple  Respiratory: CTA B/L  Cardiovascular: RRR  Gastrointestinal: Soft abdomen, NT, ND  Genitourinary: PCNU open to drain, clear yellow urine  Extremities: SCD's in place and working bilaterally, No edema  Vascular: Palpable dp pulses bilaterally  Neurological: A&O x3  Skin: wound well healed  Musculoskeletal: Moving all extremities  Psychiatric: Responsive

## 2020-07-09 NOTE — DISCHARGE NOTE PROVIDER - NSDCFUADDAPPT_GEN_ALL_CORE_FT
FOLLOW-UP:  1. Please call to make a follow-up appointment with  ****** ( date  )  2. Please follow up with your primary care physician in one week regarding your hospitalization. FOLLOW-UP:  1. Please call to make a follow-up appointment with Dr. Bhakta Next Week.   2. Please follow up with your primary care physician in one week regarding your hospitalization.

## 2020-07-09 NOTE — DISCHARGE NOTE PROVIDER - NSDCMRMEDTOKEN_GEN_ALL_CORE_FT
0.2% NIFEdipine rectal ointment: 1 application rectal 2 times a day   allopurinol 100 mg oral tablet: 1 tab(s) orally once a day  B Complex 50 oral tablet, extended release: 1 tab(s) orally once a day  calcium carbonate 500 mg (200 mg elemental calcium) oral tablet, chewable: 1 tab(s) orally 3 times a day  famotidine 20 mg oral tablet: 1 tab(s) orally once a day  gabapentin 300 mg oral capsule: 1 cap(s) orally once a day  hydrocortisone 25 mg rectal suppository: 1 suppository(ies) rectal once a day  Januvia 50 mg oral tablet: 0.5 tab(s) orally once a day  metoprolol succinate 100 mg oral tablet, extended release: 1 tab(s) orally once a day  mycophenolate mofetil 250 mg oral capsule: 1000 milligram(s) orally 2 times a day  NIFEdipine 30 mg oral tablet, extended release: 1 tab(s) orally once a day  Normal Saline Flush 0.9% injectable solution: Flush abdominal catheter with 10ml 3 times a week   predniSONE 5 mg oral tablet: 1 tab(s) orally once a day  sodium citrate-citric acid 500 mg-334 mg/5 mL oral solution: 15 milliliter(s) orally 3 times a day  sulfamethoxazole-trimethoprim 400 mg-80 mg oral tablet: 1 tab(s) orally once a day  tacrolimus 1 mg oral tablet, extended release: 5 tab(s) orally   tamsulosin 0.4 mg oral capsule: 1 cap(s) orally once a day (at bedtime) allopurinol 100 mg oral tablet: 1 tab(s) orally once a day  B Complex 50 oral tablet, extended release: 1 tab(s) orally once a day  ciprofloxacin 250 mg oral tablet: 1 tab(s) orally 2 times a day  Envarsus XR 4 mg oral tablet, extended release: 1 tab(s) orally   famotidine 20 mg oral tablet: 1 tab(s) orally once a day  gabapentin 300 mg oral capsule: 1 cap(s) orally once a day  hydrocortisone 25 mg rectal suppository: 1 suppository(ies) rectal once a day  Januvia 50 mg oral tablet: 0.5 tab(s) orally once a day  metoprolol succinate 100 mg oral tablet, extended release: 1 tab(s) orally once a day  NIFEdipine 30 mg oral tablet, extended release: 2 tab(s) orally once a day  predniSONE 5 mg oral tablet: 1 tab(s) orally once a day  sodium citrate-citric acid 500 mg-334 mg/5 mL oral solution: 15 milliliter(s) orally 3 times a day  sulfamethoxazole-trimethoprim 400 mg-80 mg oral tablet: 1 tab(s) orally once a day  tamsulosin 0.4 mg oral capsule: 1 cap(s) orally once a day (at bedtime)

## 2020-07-09 NOTE — DISCHARGE NOTE PROVIDER - HOSPITAL COURSE
68 y/o/M with a PMH of ESRD 2/2 bilateral nephrectomy from neoplasm, hypertension, and NIDDM.  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 h/o CMV viremia. Re-admitted in 2/2020 with CHELA and hydronephrosis, PCN placed 2/25 by IR, which was converted to PCNU on 2/27.             Presented to ED with fever and generalized weakness x 3 days. Was recently tx outpt for UTI with keflex, completed abx a week ago. Urine cx on 6/15 coag neg staph, hx of E coli UTI in the past.  In ED: Febrile 38.6, , and chela  creat  ~6 (baseline ~2). Recently had ureteral stent placed on 6/18/20; PCN in place (capped), uncapped in ED. MMF was held he received Vanc x 1 and was treated w/ Cefepime 2g. blood and urine cultures pos for carbapenems resistant pseudomonas, sensitive to cefepime and cipro. His PCN was uncapped on 7/9 and he voided w/o difficulty.  His creatinine trended down to #### by discharge and his immunosuppression was optimized.      He was evaluated by the multidisciplinary team and deemed suitable for d/c home with close out patient follow up.             BK PCR neg on 6/15     CMV PCR detected 6/15, neg on 7/7            Abx: Cipro 250 mg BID x 14 days    IMMUNO:     Envarsis ++++    MMF  +++++ 68 y/o/M with a PMH of ESRD 2/2 bilateral nephrectomy from neoplasm, hypertension, and NIDDM.  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 h/o CMV viremia. Re-admitted in 2/2020 with CHELA and hydronephrosis, PCN placed 2/25 by IR, which was converted to PCNU on 2/27.             Presented to ED with fever and generalized weakness x 3 days. Was recently tx outpt for UTI with keflex, completed abx a week ago. Urine cx on 6/15 coag neg staph, hx of E coli UTI in the past.  In ED: Febrile 38.6, , and chela  creat  ~6 (baseline ~2). Recently had ureteral stent placed on 6/18/20; PCN in place (capped), uncapped in ED. MMF was held he received Vanc x 1 and was treated w/ Cefepime 2g. blood and urine cultures pos for carbapenems resistant pseudomonas, sensitive to cefepime and cipro. His PCN was uncapped on 7/9 and he voided w/o difficulty. Repeat blood cultures were sent on 7/8 and ++++++  His creatinine trended down to #### by discharge and his immunosuppression was optimized.      He was evaluated by the multidisciplinary team and deemed suitable for d/c home with close out patient follow up.             BK PCR neg on 6/15     CMV PCR detected 6/15, neg on 7/7            Abx: Cipro 250 mg BID x 14 days    IMMUNO:     Envarsis ++++    MMF  +++++ 68 y/o/M with a PMH of ESRD 2/2 bilateral nephrectomy from neoplasm, hypertension, and NIDDM.  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 h/o CMV viremia. Re-admitted in 2/2020 with CHEAL and hydronephrosis, PCN placed 2/25 by IR, which was converted to PCNU on 2/27.             Presented to ED with fever and generalized weakness x 3 days. Was recently tx outpt for UTI with keflex, completed abx a week ago. Urine cx on 6/15 coag neg staph, hx of E coli UTI in the past.  In ED: Febrile 38.6, , and chela  creat  ~6 (baseline ~2). Recently had ureteral stent placed on 6/18/20; PCN in place (capped), uncapped in ED. MMF was held he received Vanc x 1 and was treated w/ Cefepime 2g. blood and urine cultures pos for carbapenems resistant pseudomonas, sensitive to cefepime and cipro. His PCN was capped on 7/9 and he voided w/o difficulty. Repeat blood cultures were sent on 7/8 and did not show any growth.   His creatinine trended down to 2.5 by discharge and his immunosuppression was optimized.      He was evaluated by the multidisciplinary team and deemed suitable for d/c home with close out patient follow up.             BK PCR neg on 6/15     CMV PCR detected 6/15, neg on 7/7            Abx: Cipro 250 mg BID x 14 days (started in hospital on 7/9/20     IMMUNO:     Envarsis 4 mg     MMF  on hold until follow up    PCN: Capped

## 2020-07-10 ENCOUNTER — TRANSCRIPTION ENCOUNTER (OUTPATIENT)
Age: 70
End: 2020-07-10

## 2020-07-10 VITALS
TEMPERATURE: 98 F | SYSTOLIC BLOOD PRESSURE: 128 MMHG | OXYGEN SATURATION: 100 % | HEART RATE: 81 BPM | RESPIRATION RATE: 20 BRPM | DIASTOLIC BLOOD PRESSURE: 65 MMHG

## 2020-07-10 LAB
ALBUMIN SERPL ELPH-MCNC: 3.3 G/DL — SIGNIFICANT CHANGE UP (ref 3.3–5)
ALP SERPL-CCNC: 71 U/L — SIGNIFICANT CHANGE UP (ref 40–120)
ALT FLD-CCNC: 30 U/L — SIGNIFICANT CHANGE UP (ref 10–45)
ANION GAP SERPL CALC-SCNC: 11 MMOL/L — SIGNIFICANT CHANGE UP (ref 5–17)
AST SERPL-CCNC: 29 U/L — SIGNIFICANT CHANGE UP (ref 10–40)
BILIRUB SERPL-MCNC: 0.2 MG/DL — SIGNIFICANT CHANGE UP (ref 0.2–1.2)
BUN SERPL-MCNC: 36 MG/DL — HIGH (ref 7–23)
CALCIUM SERPL-MCNC: 9.1 MG/DL — SIGNIFICANT CHANGE UP (ref 8.4–10.5)
CHLORIDE SERPL-SCNC: 110 MMOL/L — HIGH (ref 96–108)
CO2 SERPL-SCNC: 17 MMOL/L — LOW (ref 22–31)
CREAT SERPL-MCNC: 2.53 MG/DL — HIGH (ref 0.5–1.3)
GLUCOSE BLDC GLUCOMTR-MCNC: 177 MG/DL — HIGH (ref 70–99)
GLUCOSE BLDC GLUCOMTR-MCNC: 254 MG/DL — HIGH (ref 70–99)
GLUCOSE SERPL-MCNC: 159 MG/DL — HIGH (ref 70–99)
HCT VFR BLD CALC: 30.8 % — LOW (ref 39–50)
HGB BLD-MCNC: 9.1 G/DL — LOW (ref 13–17)
MAGNESIUM SERPL-MCNC: 1.7 MG/DL — SIGNIFICANT CHANGE UP (ref 1.6–2.6)
MCHC RBC-ENTMCNC: 29.5 GM/DL — LOW (ref 32–36)
MCHC RBC-ENTMCNC: 30.1 PG — SIGNIFICANT CHANGE UP (ref 27–34)
MCV RBC AUTO: 102 FL — HIGH (ref 80–100)
NRBC # BLD: 0 /100 WBCS — SIGNIFICANT CHANGE UP (ref 0–0)
PHOSPHATE SERPL-MCNC: 2.3 MG/DL — LOW (ref 2.5–4.5)
PLATELET # BLD AUTO: 197 K/UL — SIGNIFICANT CHANGE UP (ref 150–400)
POTASSIUM SERPL-MCNC: 4.3 MMOL/L — SIGNIFICANT CHANGE UP (ref 3.5–5.3)
POTASSIUM SERPL-SCNC: 4.3 MMOL/L — SIGNIFICANT CHANGE UP (ref 3.5–5.3)
PROT SERPL-MCNC: 6.6 G/DL — SIGNIFICANT CHANGE UP (ref 6–8.3)
RBC # BLD: 3.02 M/UL — LOW (ref 4.2–5.8)
RBC # FLD: 14.1 % — SIGNIFICANT CHANGE UP (ref 10.3–14.5)
SODIUM SERPL-SCNC: 138 MMOL/L — SIGNIFICANT CHANGE UP (ref 135–145)
TACROLIMUS SERPL-MCNC: 5.8 NG/ML — SIGNIFICANT CHANGE UP
WBC # BLD: 3.47 K/UL — LOW (ref 3.8–10.5)
WBC # FLD AUTO: 3.47 K/UL — LOW (ref 3.8–10.5)

## 2020-07-10 PROCEDURE — 71045 X-RAY EXAM CHEST 1 VIEW: CPT

## 2020-07-10 PROCEDURE — 84132 ASSAY OF SERUM POTASSIUM: CPT

## 2020-07-10 PROCEDURE — 83605 ASSAY OF LACTIC ACID: CPT

## 2020-07-10 PROCEDURE — 99285 EMERGENCY DEPT VISIT HI MDM: CPT | Mod: 25

## 2020-07-10 PROCEDURE — 76770 US EXAM ABDO BACK WALL COMP: CPT

## 2020-07-10 PROCEDURE — 74176 CT ABD & PELVIS W/O CONTRAST: CPT

## 2020-07-10 PROCEDURE — 85610 PROTHROMBIN TIME: CPT

## 2020-07-10 PROCEDURE — 82947 ASSAY GLUCOSE BLOOD QUANT: CPT

## 2020-07-10 PROCEDURE — 83735 ASSAY OF MAGNESIUM: CPT

## 2020-07-10 PROCEDURE — 82803 BLOOD GASES ANY COMBINATION: CPT

## 2020-07-10 PROCEDURE — 83036 HEMOGLOBIN GLYCOSYLATED A1C: CPT

## 2020-07-10 PROCEDURE — 96374 THER/PROPH/DIAG INJ IV PUSH: CPT

## 2020-07-10 PROCEDURE — 85730 THROMBOPLASTIN TIME PARTIAL: CPT

## 2020-07-10 PROCEDURE — 99232 SBSQ HOSP IP/OBS MODERATE 35: CPT | Mod: GC

## 2020-07-10 PROCEDURE — 82330 ASSAY OF CALCIUM: CPT

## 2020-07-10 PROCEDURE — 80048 BASIC METABOLIC PNL TOTAL CA: CPT

## 2020-07-10 PROCEDURE — 87150 DNA/RNA AMPLIFIED PROBE: CPT

## 2020-07-10 PROCEDURE — 87040 BLOOD CULTURE FOR BACTERIA: CPT

## 2020-07-10 PROCEDURE — 85027 COMPLETE CBC AUTOMATED: CPT

## 2020-07-10 PROCEDURE — 93005 ELECTROCARDIOGRAM TRACING: CPT

## 2020-07-10 PROCEDURE — 84100 ASSAY OF PHOSPHORUS: CPT

## 2020-07-10 PROCEDURE — 82435 ASSAY OF BLOOD CHLORIDE: CPT

## 2020-07-10 PROCEDURE — 84295 ASSAY OF SERUM SODIUM: CPT

## 2020-07-10 PROCEDURE — 99232 SBSQ HOSP IP/OBS MODERATE 35: CPT

## 2020-07-10 PROCEDURE — 87086 URINE CULTURE/COLONY COUNT: CPT

## 2020-07-10 PROCEDURE — 80197 ASSAY OF TACROLIMUS: CPT

## 2020-07-10 PROCEDURE — 87186 SC STD MICRODIL/AGAR DIL: CPT

## 2020-07-10 PROCEDURE — 80053 COMPREHEN METABOLIC PANEL: CPT

## 2020-07-10 PROCEDURE — 85014 HEMATOCRIT: CPT

## 2020-07-10 PROCEDURE — 82962 GLUCOSE BLOOD TEST: CPT

## 2020-07-10 PROCEDURE — 81001 URINALYSIS AUTO W/SCOPE: CPT

## 2020-07-10 PROCEDURE — 87635 SARS-COV-2 COVID-19 AMP PRB: CPT

## 2020-07-10 RX ORDER — HYDRALAZINE HCL 50 MG
10 TABLET ORAL ONCE
Refills: 0 | Status: DISCONTINUED | OUTPATIENT
Start: 2020-07-10 | End: 2020-07-10

## 2020-07-10 RX ORDER — NIFEDIPINE 30 MG
60 TABLET, EXTENDED RELEASE 24 HR ORAL DAILY
Refills: 0 | Status: DISCONTINUED | OUTPATIENT
Start: 2020-07-10 | End: 2020-07-10

## 2020-07-10 RX ORDER — GABAPENTIN 400 MG/1
1 CAPSULE ORAL
Qty: 0 | Refills: 0 | DISCHARGE
Start: 2020-07-10

## 2020-07-10 RX ORDER — CIPROFLOXACIN LACTATE 400MG/40ML
1 VIAL (ML) INTRAVENOUS
Qty: 26 | Refills: 0
Start: 2020-07-10 | End: 2020-07-22

## 2020-07-10 RX ORDER — ALLOPURINOL 300 MG
1 TABLET ORAL
Qty: 0 | Refills: 0 | DISCHARGE
Start: 2020-07-10

## 2020-07-10 RX ORDER — TACROLIMUS 5 MG/1
1 CAPSULE ORAL
Qty: 0 | Refills: 0 | DISCHARGE
Start: 2020-07-10

## 2020-07-10 RX ORDER — CITRIC ACID/SODIUM CITRATE 300-500 MG
15 SOLUTION, ORAL ORAL
Qty: 0 | Refills: 0 | DISCHARGE
Start: 2020-07-10

## 2020-07-10 RX ORDER — CIPROFLOXACIN LACTATE 400MG/40ML
1 VIAL (ML) INTRAVENOUS
Qty: 0 | Refills: 0 | DISCHARGE
Start: 2020-07-10

## 2020-07-10 RX ORDER — MAGNESIUM SULFATE 500 MG/ML
2 VIAL (ML) INJECTION ONCE
Refills: 0 | Status: COMPLETED | OUTPATIENT
Start: 2020-07-10 | End: 2020-07-10

## 2020-07-10 RX ORDER — TACROLIMUS 5 MG/1
1 CAPSULE ORAL DAILY
Refills: 0 | Status: DISCONTINUED | OUTPATIENT
Start: 2020-07-10 | End: 2020-07-10

## 2020-07-10 RX ORDER — HYDRALAZINE HCL 50 MG
10 TABLET ORAL ONCE
Refills: 0 | Status: COMPLETED | OUTPATIENT
Start: 2020-07-10 | End: 2020-07-10

## 2020-07-10 RX ORDER — METOPROLOL TARTRATE 50 MG
1 TABLET ORAL
Qty: 0 | Refills: 0 | DISCHARGE
Start: 2020-07-10

## 2020-07-10 RX ORDER — LABETALOL HCL 100 MG
10 TABLET ORAL ONCE
Refills: 0 | Status: COMPLETED | OUTPATIENT
Start: 2020-07-10 | End: 2020-07-10

## 2020-07-10 RX ORDER — TAMSULOSIN HYDROCHLORIDE 0.4 MG/1
1 CAPSULE ORAL
Qty: 0 | Refills: 0 | DISCHARGE
Start: 2020-07-10

## 2020-07-10 RX ORDER — CITRIC ACID/SODIUM CITRATE 300-500 MG
15 SOLUTION, ORAL ORAL
Qty: 0 | Refills: 0 | DISCHARGE

## 2020-07-10 RX ORDER — TACROLIMUS 5 MG/1
4 CAPSULE ORAL
Refills: 0 | Status: DISCONTINUED | OUTPATIENT
Start: 2020-07-11 | End: 2020-07-10

## 2020-07-10 RX ORDER — FAMOTIDINE 10 MG/ML
1 INJECTION INTRAVENOUS
Qty: 0 | Refills: 0 | DISCHARGE
Start: 2020-07-10

## 2020-07-10 RX ADMIN — Medication 60 MILLIGRAM(S): at 06:05

## 2020-07-10 RX ADMIN — Medication 10 MILLIGRAM(S): at 04:06

## 2020-07-10 RX ADMIN — Medication 10 MILLIGRAM(S): at 01:30

## 2020-07-10 RX ADMIN — Medication 5 MILLIGRAM(S): at 06:06

## 2020-07-10 RX ADMIN — Medication 250 MILLIGRAM(S): at 06:06

## 2020-07-10 RX ADMIN — Medication 2: at 08:50

## 2020-07-10 RX ADMIN — CEFEPIME 100 MILLIGRAM(S): 1 INJECTION, POWDER, FOR SOLUTION INTRAMUSCULAR; INTRAVENOUS at 06:06

## 2020-07-10 RX ADMIN — Medication 15 MILLILITER(S): at 12:53

## 2020-07-10 RX ADMIN — Medication 50 GRAM(S): at 08:51

## 2020-07-10 RX ADMIN — FAMOTIDINE 20 MILLIGRAM(S): 10 INJECTION INTRAVENOUS at 12:52

## 2020-07-10 RX ADMIN — Medication 15 MILLILITER(S): at 06:06

## 2020-07-10 RX ADMIN — Medication 6: at 12:53

## 2020-07-10 RX ADMIN — Medication 1 TABLET(S): at 12:52

## 2020-07-10 RX ADMIN — TACROLIMUS 1 MILLIGRAM(S): 5 CAPSULE ORAL at 12:52

## 2020-07-10 RX ADMIN — Medication 100 MILLIGRAM(S): at 12:52

## 2020-07-10 RX ADMIN — GABAPENTIN 300 MILLIGRAM(S): 400 CAPSULE ORAL at 12:52

## 2020-07-10 NOTE — DISCHARGE NOTE NURSING/CASE MANAGEMENT/SOCIAL WORK - NSDCFUADDAPPT_GEN_ALL_CORE_FT
FOLLOW-UP:  1. Please call to make a follow-up appointment with Dr. Bhakta Next Week.   2. Please follow up with your primary care physician in one week regarding your hospitalization.

## 2020-07-10 NOTE — PROGRESS NOTE ADULT - REASON FOR ADMISSION
CHELA/Fever

## 2020-07-10 NOTE — PROGRESS NOTE ADULT - SUBJECTIVE AND OBJECTIVE BOX
Transplant Surgery - Multidisciplinary Rounds  --------------------------------------------------------------  DDRT HCV+    Date: 2018       Admitted 2020 with UTI    Present:   Patient seen and examined with multidisciplinary team including Transplant Surgeon: Dr. Nicole. Transplant Nephrologist: Dr. CHAYO Bhakta.  Pharmacist: Abhishek Harrell. NP: Silva William,  and surgical resident Matt Gonzalez Disciplines not in attendance will be notified of the plan.     HPI: 70 y/o/M with a PMH of ESRD 2/2 bilateral nephrectomy from neoplasm, hypertension, and NIDDM.  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 h/o CMV viremia.     Re-admitted in 2019 with E coli bacteremia/urosepsis; treated w/ IV vanc/cefepime/zosyn.   Re-admitted in 2019 with severe CHELA 2/2 FK toxicity (level on admission was 39), +UA, was treated w/ cefepime.   Re-admitted in 2019 w/ uptrending Cr on outpatient labs (3.4), was  treated with pulse steroid for ACR 1B.   Re-admitted in 2020 with CHELA and hydronephrosis, PCN placed  by IR, which was converted to PCNU on .     Presented to ED with fever and generalized weakness past 3 days. Denies dysuria, hematuria, has some frequency on urination. Recently tx for UTI with keflex, completed abx a week ago. Urine cx on 6/15 coag neg staph, hx of E coli UTI in the past.  In ED: Febrile 38.6, , /60. Labs significant for elevated creat  ~6 (baseline ~2). Recently had ureteral stent placed on ; PCN in place (capped). Non contrast CT a/p in ED non specific. CXR clear. Awaiting for renal doppler. Denies respiratory symptoms. Denies abdominal pain. Received Cefepime 2g in ED with NS bolus 2L.     BK PCR neg on 6/15   CMV PCR detected 6/15, neg on     Interval Events:    Remains afebrile started on cipro 250 mg PO BID and on Cefepime, Urine cult pos carbapenem resistant pseudomonas, sensitive to cefepime and cipro  Repeat cultures NGTD  PCN capped voiding well  Cr trending down appropriately  No other complaints or issues at this time    Potential Discharge date: today    Education:  Medications    Plan of care:  See Below          MEDICATIONS  (STANDING):  allopurinol 100 milliGRAM(s) Oral daily  cefepime   IVPB 1000 milliGRAM(s) IV Intermittent every 12 hours  ciprofloxacin     Tablet 250 milliGRAM(s) Oral two times a day  citric acid/sodium citrate Solution 15 milliLiter(s) Oral three times a day  dextrose 5%. 1000 milliLiter(s) (50 mL/Hr) IV Continuous <Continuous>  dextrose 50% Injectable 12.5 Gram(s) IV Push once  dextrose 50% Injectable 25 Gram(s) IV Push once  dextrose 50% Injectable 25 Gram(s) IV Push once  famotidine    Tablet 20 milliGRAM(s) Oral daily  gabapentin 300 milliGRAM(s) Oral daily  insulin lispro (HumaLOG) corrective regimen sliding scale   SubCutaneous at bedtime  insulin lispro (HumaLOG) corrective regimen sliding scale   SubCutaneous three times a day before meals  insulin lispro (HumaLOG) corrective regimen sliding scale   SubCutaneous at bedtime  metoprolol succinate  milliGRAM(s) Oral daily  NIFEdipine XL 60 milliGRAM(s) Oral daily  predniSONE   Tablet 5 milliGRAM(s) Oral daily  tacrolimus ER Tablet (ENVARSUS XR) 3 milliGRAM(s) Oral <User Schedule>  tamsulosin 0.4 milliGRAM(s) Oral at bedtime  trimethoprim   80 mG/sulfamethoxazole 400 mG 1 Tablet(s) Oral daily    MEDICATIONS  (PRN):  dextrose 40% Gel 15 Gram(s) Oral once PRN Blood Glucose LESS THAN 70 milliGRAM(s)/deciliter  glucagon  Injectable 1 milliGRAM(s) IntraMuscular once PRN Glucose LESS THAN 70 milligrams/deciliter  hydrALAZINE Injectable 10 milliGRAM(s) IV Push once PRN HTN SBP >160      PAST MEDICAL & SURGICAL HISTORY:  Medial meniscus tear: &#x27; 90&#x27;s  Right  Bowel obstruction: &#x27; 2017   surgically treated  Anal fissure: dx: &#x27; 2018   No surgery  Benign prostatic hypertrophy  Hepatitis C: In Donor Kidney: patient received treatment for Hep. 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 :  +  Hep C Donor  S/P right knee arthroscopy: &#x27; 90&#x27;s  H/O ileostomy: &#x27; 2017   for 3 months. 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      Vital Signs Last 24 Hrs  T(C): 36.8 (10 Jul 2020 09:00), Max: 36.9 (2020 13:00)  T(F): 98.2 (10 Jul 2020 09:00), Max: 98.5 (2020 13:00)  HR: 79 (10 Jul 2020 09:00) (64 - 82)  BP: 147/70 (10 Jul 2020 09:00) (139/70 - 183/84)  BP(mean): 120 (10 Jul 2020 01:04) (120 - 120)  RR: 18 (10 Jul 2020 09:00) (18 - 18)  SpO2: 100% (10 Jul 2020 09:00) (99% - 100%)    I&O's Summary    2020 07:01  -  10 Jul 2020 07:00  --------------------------------------------------------  IN: 120 mL / OUT: 2510 mL / NET: -2390 mL    10 Jul 2020 07:01  -  10 Jul 2020 10:21  --------------------------------------------------------  IN: 275 mL / OUT: 175 mL / NET: 100 mL          LABS:                        9.1    3.47  )-----------( 197      ( 10 Jul 2020 05:56 )             30.8     07-10    138  |  110<H>  |  36<H>  ----------------------------<  159<H>  4.3   |  17<L>  |  2.53<H>    Ca    9.1      10 Jul 2020 05:56  Phos  2.3     07-10  Mg     1.7     07-10    TPro  6.6  /  Alb  3.3  /  TBili  0.2  /  DBili  x   /  AST  29  /  ALT  30  /  AlkPhos  71  10        CAPILLARY BLOOD GLUCOSE      POCT Blood Glucose.: 177 mg/dL (10 Jul 2020 08:34)  POCT Blood Glucose.: 179 mg/dL (2020 21:45)  POCT Blood Glucose.: 243 mg/dL (2020 18:12)  POCT Blood Glucose.: 189 mg/dL (2020 13:34)    LIVER FUNCTIONS - ( 10 Jul 2020 05:56 )  Alb: 3.3 g/dL / Pro: 6.6 g/dL / ALK PHOS: 71 U/L / ALT: 30 U/L / AST: 29 U/L / GGT: x             Culture - Blood (collected 20 @ 09:01)  Source: .Blood Blood-Peripheral  Preliminary Report (07-10-20 @ 10:01):    No growth to date.    Culture - Blood (collected 20 @ 14:50)  Source: .Blood Blood-Peripheral  Preliminary Report (20 @ 15:01):    No growth to date.    Culture - Urine (collected 20 @ 19:39)  Source: .Urine Clean Catch (Midstream)  Final Report (20 @ 17:41):    10,000 - 49,000 CFU/mL Pseudomonas aeruginosa (Carbapenem Resistant)  Organism: Pseudomonas aeruginosa (Carbapenem Resistant) (20 @ 17:42)  Organism: Pseudomonas aeruginosa (Carbapenem Resistant) (20 @ 17:42)    Culture - Blood (collected 20 @ 16:38)  Source: .Blood Blood-Peripheral  Gram Stain (20 @ 01:19):    Growth in aerobic bottle: Gram Negative Rods  Final Report (20 @ 14:32):    Growth in aerobic bottle: Pseudomonas aeruginosa    ***Blood Panel PCR results on this specimen are available    approximately 3 hours after the Gram stain result.***    Gram stain, PCR, and/or culture results may not always    correspond due to differencein methodologies.    ************************************************************    This PCR assay was performed using [employer].    The following targets are tested for: Enterococcus,    vancomycin resistant enterococci, Listeria monocytogenes,    coagulase negative staphylococci, S. aureus,    methicillin resistant S. aureus, Streptococcus agalactiae    (Group B), S. pneumoniae, S. pyogenes (Group A),    Acinetobacter baumannii, Enterobacter cloacae, E. coli,    Klebsiella oxytoca, K. pneumoniae, Proteus sp.,    Serratia marcescens, Haemophilus influenzae,    Neisseria meningitidis, Pseudomonas aeruginosa, Candida    albicans, C. glabrata, C krusei, C parapsilosis,    C. tropicalis and the KPC resistance gene.    "Due to technical problems, Proteus sp. will Not be reported as part of    the BCID panel until further notice"  Organism: Blood Culture PCR  Pseudomonas aeruginosa (20 @ 14:32)  Organism: Pseudomonas aeruginosa (20 @ 14:32)  Organism: Blood Culture PCR (20 @ 14:32)    Culture - Blood (collected 20 @ 16:38)  Source: .Blood Blood-Peripheral  Gram Stain (20 @ 04:20):    Growth in aerobic bottle: Gram Negative Rods  Final Report (20 @ 14:34):    Growth in aerobic bottle: Pseudomonas aeruginosa    See previous culture [id number]      Tacrolimus (), Serum: 9.3 ng/mL ( @ 09:22)      Cultures:    Culture - Blood (collected 20 @ 09:01)  Source: .Blood Blood-Peripheral  Preliminary Report (07-10-20 @ 10:01):    No growth to date.    Culture - Blood (collected 20 @ 14:50)  Source: .Blood Blood-Peripheral  Preliminary Report (20 @ 15:01):    No growth to date.    Culture - Urine (collected 20 @ 19:39)  Source: .Urine Clean Catch (Midstream)  Final Report (20 @ 17:41):    10,000 - 49,000 CFU/mL Pseudomonas aeruginosa (Carbapenem Resistant)  Organism: Pseudomonas aeruginosa (Carbapenem Resistant) (20 @ 17:42)  Organism: Pseudomonas aeruginosa (Carbapenem Resistant) (20 @ 17:42)    Culture - Blood (collected 20 @ 16:38)  Source: .Blood Blood-Peripheral  Gram Stain (20 @ 01:19):    Growth in aerobic bottle: Gram Negative Rods  Final Report (20 @ 14:32):    Growth in aerobic bottle: Pseudomonas aeruginosa    ***Blood Panel PCR results on this specimen are available    approximately 3 hours after the Gram stain result.***    Gram stain, PCR, and/or culture results may not always    correspond due to differencein methodologies.    ************************************************************    This PCR assay was performed using [employer].    The following targets are tested for: Enterococcus,    vancomycin resistant enterococci, Listeria monocytogenes,    coagulase negative staphylococci, S. aureus,    methicillin resistant S. aureus, Streptococcus agalactiae    (Group B), S. pneumoniae, S. pyogenes (Group A),    Acinetobacter baumannii, Enterobacter cloacae, E. coli,    Klebsiella oxytoca, K. pneumoniae, Proteus sp.,    Serratia marcescens, Haemophilus influenzae,    Neisseria meningitidis, Pseudomonas aeruginosa, Candida    albicans, C. glabrata, C krusei, C parapsilosis,    C. tropicalis and the KPC resistance gene.    "Due to technical problems, Proteus sp. will Not be reported as part of    the BCID panel until further notice"  Organism: Blood Culture PCR  Pseudomonas aeruginosa (20 @ 14:32)  Organism: Pseudomonas aeruginosa (20 @ 14:32)  Organism: Blood Culture PCR (20 @ 14:32)    Culture - Blood (collected 20 @ 16:38)  Source: .Blood Blood-Peripheral  Gram Stain (20 @ 04:20):    Growth in aerobic bottle: Gram Negative Rods  Final Report (20 @ 14:34):    Growth in aerobic bottle: Pseudomonas aeruginosa    See previous culture [id number]        Review of systems  Gen: No weight changes, fatigue, fevers/chills  Skin: No rashes  Head/Eyes/Ears/Mouth: No headache; Normal hearing; Normal vision w/o blurriness; No sinus pain/discomfort, sore throat  Respiratory: No dyspnea, cough, wheezing, hemoptysis  CV: No chest pain, PND, orthopnea  GI: Denies abdominal pain, diarrhea, constipation, nausea, vomiting, melena, hematochezia  : No increased frequency, dysuria, hematuria, nocturia. PCNU open to drain  MSK: No joint pain/swelling; no back pain; no edema  Neuro: No dizziness/lightheadedness, weakness, seizures, numbness, tingling  Heme: No easy bruising or bleeding  Endo: No heat/cold intolerance  Psych: No significant nervousness, anxiety, stress, depression  All other systems were reviewed and are negative, except as noted.      PHYSICAL EXAM:  Constitutional: Well developed / well nourished  Eyes: Anicteric, PERRLA  ENMT: nc/at  Neck: supple  Respiratory: CTA B/L  Cardiovascular: RRR  Gastrointestinal: Soft abdomen, NT, ND  Genitourinary: PCNU capped voiding, clear yellow urine  Extremities: SCD's in place and working bilaterally, No edema  Vascular: Palpable dp pulses bilaterally  Neurological: A&O x3  Skin: wound well healed  Musculoskeletal: Moving all extremities  Psychiatric: Responsive

## 2020-07-10 NOTE — PROGRESS NOTE ADULT - ASSESSMENT
70 y/o/M with a PMH of ESRD 2/2 bilateral nephrectomy from neoplasm, hypertension, and NIDDM s/p HCV + DDRT (7/2018) with complicated post op course, including DGF, ATN/mild rejection, hydronephrosis, perinephric collection, E coli urosepsis with bacteremia, CMV viremia, severe CHELA 2/2 FK toxicity and ACR 1B, ureteral stricture requiring PCN now presenting with fevers and weakness possibly secondary to UTI, elevated creatinine      [] S/P DDRT 7/2018, presents with CHELA and fever  - IR PCN placed 2/25, PCN-U  placed (2/27), ureteral stent exchange with dilation done 6/2018 and PCNU was capped thereafter, now presenting with fever and weakness, elevated creatinine  - Keep PCNU open to drain  - FU Blood and Urine Cx sent 7/5.   Continue Cefepime for likely UTI    - Repeat labs this afternoon  - Immuno: Envarsus 4mg daily, Hold MMF (1g bid) in setting of fever; Pred 5mg daily   - Strict I/Os  - Start  NS@70ml/hour   - Regular, CHO restricted diet  - FU CMV PCR  - sent today (detected on output lab 6/15)   - Daily CBC, BMP, Tac level  - Continue Flomax    [] DM  - Fingersticks ac and qhs  - Continue ISS   - Takes Glyburide at home    [] HTN  - BP meds held for -120 since admission  - Resume Nifedipine 30daily and Metoprolol ER 100mg daily when appropriate
68 y/o/M with a PMH of ESRD 2/2 bilateral nephrectomy from neoplasm, hypertension, and NIDDM s/p HCV + DDRT (7/2018) with complicated post op course, including DGF, ATN/mild rejection, hydronephrosis, perinephric collection, E coli urosepsis with bacteremia, CMV viremia, severe CHELA 2/2 FK toxicity and ACR 1B, ureteral stricture requiring PCN now presenting with fevers, CHELA 2/2 to UTI with bacteremia    [] S/P DDRT 7/2018, admitted with Pseudomonas UTI  - IR PCN placed 2/25, PCN-U  placed (2/27), ureteral stent exchange with dilation done 6/2019 and PCNU was capped thereafter, now capped.  - 7/5 Blood  and urine pos carbapenem resistant pseudomonas. Sensitive to cefepime and cipro,  Continue Cefepime   - repeat bc sent 7/8  NGTD  - on  cipro 250 mg PO BID continue total 14 days on d/c   -  strict I&Os  - Immuno: Envarsus 3mg daily, Hold MMF (1g bid) in setting of infection; Pred 5mg daily   - Strict I/Os  - Regular, CHO restricted diet  - CMV PCR negative on 7/7  - Continue Flomax    [] DM  - Fingersticks ac and qhs  - Continue ISS   - Takes Glyburide at home    [] HTN  - controlled on home regimen of Toprol/Procardia    [] Dispo  - d/c today   - continue cipro 250 mg PO BID x 14 days on d/c home
68 y/o/M with a PMH of ESRD 2/2 bilateral nephrectomy from neoplasm, hypertension, and DM2 s/p HCV + DDRT (7/2018) admitted with CHELA and fever.    1.  Renal transplant with CHELA - creatinine 6 from baseline of 2.  Likely related to pyelo and dehydration.  Start IVF and cont antibiotics.  2.  UTI - await urine culture, cont cefepime.  Pt also s/p 1 dose vancomycin.  cont flomax for possible KAUFMAN  3.  Immunosuppression - hold MMF, cont prednisone and tacrolimus  4.  HTN - stable off of medications.   5.  Acidosis - restart bicitra.  Pt on it at home.   6.  DM - cont sliding scale
68 y/o/M with a PMH of ESRD 2/2 bilateral nephrectomy from neoplasm, hypertension, and DM2 s/p HCV + DDRT (7/2018) admitted with CHELA and fever.    1.  Renal transplant with CHELA - creatinine improved to 2.5 - baseline.     2.  UTI - Pseudomonas in blood, sensitive to cefepime and cipro.  cont flomax for possible KAUFMAN. discharge today on cipro for 14 days total.   3.  Immunosuppression - hold MMF, cont prednisone and tacrolimus.  Pt also leukopenic.   4.  HTN - added nifedipine.  Pt will continue and check blood pressures at home.   5.  Acidosis - cont bicitra  6.  DM - cont sliding scale     Pt will f/u in transplant office 1 week post discharge.
68 y/o/M with a PMH of ESRD 2/2 bilateral nephrectomy from neoplasm, hypertension, and DM2 s/p HCV + DDRT (7/2018) admitted with CHELA and fever.    1.  Renal transplant with CHELA - creatinine improved to 5. Likely related to pyelo and dehydration.  Now eating well, cont antibiotics.  Keep nephrostomy open but plan to cap tomorrow.  Stop IVF.   2.  UTI - Pseudomonas in blood, sensitive to cefepime and cipro.  cont flomax for possible KAUFMAN.  Repeat blood cultures and possible discharge home Friday on cipro.    3.  Immunosuppression - hold MMF, cont prednisone and tacrolimus.  Pt also leukopenic.   4.  HTN - resumed home meds, stop IVF.    5.  Acidosis - cont bicitra  6.  DM - cont sliding scale
68 y/o/M with a PMH of ESRD 2/2 bilateral nephrectomy from neoplasm, hypertension, and NIDDM s/p HCV + DDRT (7/2018) with complicated post op course, including DGF, ATN/mild rejection, hydronephrosis, perinephric collection, E coli urosepsis with bacteremia, CMV viremia, severe CHELA 2/2 FK toxicity and ACR 1B, ureteral stricture requiring PCN now presenting with fevers and weakness and elevated creatinine secondary to UTI with bacteremia      [] S/P DDRT 7/2018, presents with CHELA and fever  - IR PCN placed 2/25, PCN-U  placed (2/27), ureteral stent exchange with dilation done 6/2018 and PCNU was capped thereafter.   - 7/5 Blood growing pseudomonas and urine growing gram negative rods.   Continue Cefepime pending sensitivities.  - Keep PCNU open to drain  - Immuno: Envarsus 3mg daily, Hold MMF (1g bid) in setting of infection; Pred 5mg daily   - Strict I/Os  - Continue NS@70ml/hour   - Regular, CHO restricted diet  - FU CMV PCR  - sent 7/6 (detected on output lab 6/15)   - Daily CBC, BMP, Tac level  - Continue Flomax    [] DM  - Fingersticks ac and qhs  - Continue ISS   - Takes Glyburide at home    [] HTN  - Resume Nifedipine 30daily and Metoprolol ER 100mg daily
68 y/o/M with a PMH of ESRD 2/2 bilateral nephrectomy from neoplasm, hypertension, and NIDDM s/p HCV + DDRT (7/2018) with complicated post op course, including DGF, ATN/mild rejection, hydronephrosis, perinephric collection, E coli urosepsis with bacteremia, CMV viremia, severe CHELA 2/2 FK toxicity and ACR 1B, ureteral stricture requiring PCN now presenting with fevers, CHELA 2/2 to UTI with bacteremia    [] S/P DDRT 7/2018, admitted with Pseudomonas UTI  - IR PCN placed 2/25, PCN-U  placed (2/27), ureteral stent exchange with dilation done 6/2019 and PCNU was capped thereafter, now OPEN.  - 7/5 Blood  and urine pos carbapenem resistant pseudomonas. Sensitive to cefepime and cipro,  Continue Cefepime   - repeat bc sent 7/8  f/u results  - start cipro 250 mg PO BID continue total 14 days on d/c   - Cap  PCNU drain with strict I&Os  - Immuno: Envarsus 3mg daily, Hold MMF (1g bid) in setting of infection; Pred 5mg daily   - Strict I/Os  - Regular, CHO restricted diet  - CMV PCR negative on 7/7  - Continue Flomax    [] DM  - Fingersticks ac and qhs  - Continue ISS   - Takes Glyburide at home    [] HTN  - controlled on home regimen of Toprol/Procardia    [] Dispo  - anticipated d/c home by end of week   - continue cipro 250 mg PO BID x 14 days on d/c home
68 y/o/M with a PMH of ESRD 2/2 bilateral nephrectomy from neoplasm, hypertension, and NIDDM s/p HCV + DDRT (7/2018) with complicated post op course, including DGF, ATN/mild rejection, hydronephrosis, perinephric collection, E coli urosepsis with bacteremia, CMV viremia, severe CHELA 2/2 FK toxicity and ACR 1B, ureteral stricture requiring PCN now presenting with fevers, CHELA 2/2 to UTI with bacteremia    [] S/P DDRT 7/2018, admitted with Pseudomonas UTI  - IR PCN placed 2/25, PCN-U  placed (2/27), ureteral stent exchange with dilation done 6/2019 and PCNU was capped thereafter, now OPEN.  - 7/5 Blood growing pseudomonas and urine growing gram negative rods.   Continue Cefepime pending sensitivities. send repeat cultures  - Keep PCNU open to drain with strict I&Os  - Immuno: Envarsus 3mg daily, Hold MMF (1g bid) in setting of infection; Pred 5mg daily   - Strict I/Os  - STOP IVF  - Regular, CHO restricted diet  - CMV PCR negative on 7/7  - Continue Flomax    [] DM  - Fingersticks ac and qhs  - Continue ISS   - Takes Glyburide at home    [] HTN  - controlled on home regimen of Toprol/Procardia    [] Dispo  -anticipated d/c home by end of week
70 y/o/M with a PMH of ESRD 2/2 bilateral nephrectomy from neoplasm, hypertension, and DM2 s/p HCV + DDRT (7/2018) admitted with CHELA and fever.    1.  Renal transplant with CHELA - creatinine improved to 2.8. Likely related to pyelo and dehydration.  almost at baseline.  Cap nephrostomy today.   2.  UTI - Pseudomonas in blood, sensitive to cefepime and cipro.  cont flomax for possible KAUFMAN. Possible discharge home Friday on cipro.    3.  Immunosuppression - hold MMF, cont prednisone and tacrolimus.  Pt also leukopenic.   4.  HTN - stable  5.  Acidosis - cont bicitra  6.  DM - cont sliding scale
70 y/o/M with a PMH of ESRD 2/2 bilateral nephrectomy from neoplasm, hypertension, and DM2 s/p HCV + DDRT (7/2018) admitted with CHELA and fever.    1.  Renal transplant with CHELA - creatinine improved to 5. Likely related to pyelo and dehydration.  Now eating well, cont antibiotics.  Keep nephrostomy open  2.  UTI - Pseudomonas in blood, cont cefepime, await sensitivities. cont flomax for possible KAUFMAN.  Will need to reassess nephrostomy later this admission.   3.  Immunosuppression - hold MMF, cont prednisone and tacrolimus  4.  HTN - rising, resume home blood pressure meds.   5.  Acidosis - cont bicitra  6.  DM - cont sliding scale

## 2020-07-10 NOTE — DISCHARGE NOTE NURSING/CASE MANAGEMENT/SOCIAL WORK - PATIENT PORTAL LINK FT
You can access the FollowMyHealth Patient Portal offered by [facility] by registering at the following website: [email or web address]. By joining [employer]’s FollowMyHealth portal, you will also be able to view your health information using other applications (apps) compatible with our system.

## 2020-07-10 NOTE — PROGRESS NOTE ADULT - ATTENDING COMMENTS
agree with above  continue antibiotics  immunosuppression tacro, and steroids. mmf on hold
agree with above  kidney function improving slowly  continue antibiotics  immunosuppression tacro, mmf and steroids
bacteremia, uti  s/p nephrostomy tube  martita improving  d/c home with po abx x 2 weeks  PCN capped, voiding without difficulty  immunosuppression - Envarsus 3mg daily; prednisone 5mg daily. Cellcept held due to infection. will resume as outpt  f/u in office next week
doing well  creatinine trending down  clamp nephrostomy tube  continue antibiotics  immunosuppression tacro, and steroids. mmf remains on hold
doing well  creatinine trending down  continue antibiotics

## 2020-07-10 NOTE — PROGRESS NOTE ADULT - SUBJECTIVE AND OBJECTIVE BOX
[facility] DIVISION OF KIDNEY DISEASES AND HYPERTENSION -- FOLLOW UP NOTE  --------------------------------------------------------------------------------  Chief Complaint:  pyelonephritis    24 hour events/subjective:  Pt feels well.  Nephrostomy capped.  Urinating normally.       PAST HISTORY  --------------------------------------------------------------------------------  No significant changes to PMH, PSH, FHx, SHx, unless otherwise noted    ALLERGIES & MEDICATIONS  --------------------------------------------------------------------------------  Allergies    No Known Allergies    Intolerances      Standing Inpatient Medications  allopurinol 100 milliGRAM(s) Oral daily  cefepime   IVPB 1000 milliGRAM(s) IV Intermittent every 12 hours  ciprofloxacin     Tablet 250 milliGRAM(s) Oral two times a day  citric acid/sodium citrate Solution 15 milliLiter(s) Oral three times a day  dextrose 5%. 1000 milliLiter(s) IV Continuous <Continuous>  dextrose 50% Injectable 12.5 Gram(s) IV Push once  dextrose 50% Injectable 25 Gram(s) IV Push once  dextrose 50% Injectable 25 Gram(s) IV Push once  famotidine    Tablet 20 milliGRAM(s) Oral daily  gabapentin 300 milliGRAM(s) Oral daily  insulin lispro (HumaLOG) corrective regimen sliding scale   SubCutaneous at bedtime  insulin lispro (HumaLOG) corrective regimen sliding scale   SubCutaneous three times a day before meals  insulin lispro (HumaLOG) corrective regimen sliding scale   SubCutaneous at bedtime  metoprolol succinate  milliGRAM(s) Oral daily  NIFEdipine XL 60 milliGRAM(s) Oral daily  predniSONE   Tablet 5 milliGRAM(s) Oral daily  tacrolimus ER Tablet (ENVARSUS XR) 1 milliGRAM(s) Oral daily  tamsulosin 0.4 milliGRAM(s) Oral at bedtime  trimethoprim   80 mG/sulfamethoxazole 400 mG 1 Tablet(s) Oral daily    PRN Inpatient Medications  dextrose 40% Gel 15 Gram(s) Oral once PRN  glucagon  Injectable 1 milliGRAM(s) IntraMuscular once PRN  hydrALAZINE Injectable 10 milliGRAM(s) IV Push once PRN      REVIEW OF SYSTEMS  --------------------------------------------------------------------------------  Gen: No fatigue, fevers/chills, weakness  Skin: No rashes  Head/Eyes/Ears/Mouth: No headache;No sore throat  Respiratory: No dyspnea, cough,   CV: No chest pain, PND, orthopnea  GI: No abdominal pain, diarrhea, constipation, nausea, vomiting  Transplant: No pain  : No increased frequency, dysuria, hematuria, nocturia  MSK: No joint pain/swelling; no back pain; no edema  Neuro: No dizziness/lightheadedness, weakness, seizures, numbness, tingling  Psych: No significant nervousness, anxiety, stress, depression    All other systems were reviewed and are negative, except as noted.    VITALS/PHYSICAL EXAM  --------------------------------------------------------------------------------  T(C): 36.7 (07-10-20 @ 12:52), Max: 36.9 (07-10-20 @ 01:04)  HR: 81 (07-10-20 @ 12:52) (64 - 82)  BP: 128/65 (07-10-20 @ 12:52) (128/65 - 183/84)  RR: 20 (07-10-20 @ 12:52) (18 - 20)  SpO2: 100% (07-10-20 @ 12:52) (99% - 100%)  Wt(kg): --        07-09-20 @ 07:01  -  07-10-20 @ 07:00  --------------------------------------------------------  IN: 120 mL / OUT: 2510 mL / NET: -2390 mL    07-10-20 @ 07:01  -  07-10-20 @ 14:45  --------------------------------------------------------  IN: 515 mL / OUT: 550 mL / NET: -35 mL      Physical Exam:  	Gen: NAD  	HEENT: PERRL, supple neck, clear oropharynx  	Pulm: CTA B/L  	CV: RRR, S1S2; no rub  	Back: No spinal or CVA tenderness; no sacral edema  	Abd: +BS, soft, nontender/nondistended                      Transplant: No tenderness, swelling, nephrostomy in place  	: No suprapubic tenderness  	UE: Warm, FROM; no edema; no asterixis  	LE: Warm, FROM; no edema  	Neuro: No focal deficits  	Psych: Normal affect and mood  	Skin: Warm, without rashes      LABS/STUDIES  --------------------------------------------------------------------------------              9.1    3.47  >-----------<  197      [07-10-20 @ 05:56]              30.8     138  |  110  |  36  ----------------------------<  159      [07-10-20 @ 05:56]  4.3   |  17  |  2.53        Ca     9.1     [07-10-20 @ 05:56]      Mg     1.7     [07-10-20 @ 05:56]      Phos  2.3     [07-10-20 @ 05:56]    TPro  6.6  /  Alb  3.3  /  TBili  0.2  /  DBili  x   /  AST  29  /  ALT  30  /  AlkPhos  71  [07-10-20 @ 05:56]          Creatinine Trend:  SCr 2.53 [07-10 @ 05:56]  SCr 2.81 [07-09 @ 06:07]  SCr 3.62 [07-08 @ 06:07]  SCr 5.05 [07-07 @ 05:55]  SCr 5.45 [07-06 @ 15:44]    Tacrolimus (), Serum: 5.8 ng/mL (07-10 @ 09:35)  Tacrolimus (), Serum: 9.3 ng/mL (07-09 @ 09:22)  Tacrolimus (), Serum: 12.9 ng/mL (07-08 @ 09:19)  Tacrolimus (), Serum: 13.2 ng/mL (07-07 @ 08:17)            Urinalysis - [07-05-20 @ 16:13]      Color Yellow / Appearance Slightly Turbid / SG 1.023 / pH 6.0      Gluc Negative / Ketone Negative  / Bili Negative / Urobili Negative       Blood Small / Protein 100 mg/dL / Leuk Est Large / Nitrite Negative      RBC 2 / WBC 42 / Hyaline 8 / Gran  / Sq Epi  / Non Sq Epi 8 / Bacteria Few      HbA1c 6.0      [08-09-19 @ 23:48]

## 2020-07-13 LAB
CULTURE RESULTS: SIGNIFICANT CHANGE UP
SPECIMEN SOURCE: SIGNIFICANT CHANGE UP

## 2020-07-14 LAB
CULTURE RESULTS: SIGNIFICANT CHANGE UP
SPECIMEN SOURCE: SIGNIFICANT CHANGE UP

## 2020-07-21 ENCOUNTER — APPOINTMENT (OUTPATIENT)
Dept: NEPHROLOGY | Facility: CLINIC | Age: 70
End: 2020-07-21
Payer: MEDICARE

## 2020-07-21 VITALS
RESPIRATION RATE: 16 BRPM | DIASTOLIC BLOOD PRESSURE: 64 MMHG | SYSTOLIC BLOOD PRESSURE: 144 MMHG | HEART RATE: 106 BPM | OXYGEN SATURATION: 96 % | BODY MASS INDEX: 30.1 KG/M2 | WEIGHT: 215 LBS | HEIGHT: 71 IN | TEMPERATURE: 98.2 F

## 2020-07-21 PROCEDURE — 99214 OFFICE O/P EST MOD 30 MIN: CPT

## 2020-07-21 RX ORDER — TACROLIMUS 1 MG/1
1 TABLET, EXTENDED RELEASE ORAL DAILY
Qty: 270 | Refills: 3 | Status: DISCONTINUED | COMMUNITY
Start: 2020-07-09 | End: 2020-07-21

## 2020-07-21 NOTE — ASSESSMENT
[FreeTextEntry1] : 1. Kidney transplantation - Pts aaron creatinine 1.9 but had CHELA with pyelonephritis. Has had multiple episodes of CHELA. 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  Creatinine has come down to 2.2.  Readmitted recently for UTI.  Will plan to send back to IR for dilation of proximal stricture.  \par 2. Immunosuppression - simulect induction, tacrolimus target 5-7, Envarsus 4 mgs.  Ok to resume MMF 500mgs BID.  Does not tolerate higher dose due to diarrhea and leukopenia. \par 3. DM2 - on januvia and glimepiride. BG controlled at home.  A1c at goal. \par 4. HTN - controlled at home. \par 5. HCV - genotype 3a. completed Epclusa. Last vl note detected. \par 6. Anemia - due to CKD/CHELA.  last Hb at goal. \par 7. Chronic diarrhea - on and off on lomotil prn. Has seen GI and had a colonoscopy. Has history of small bowel resection which may be the cause of diarrhea. 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 \par 9. CMV - negative recently\par 10.  UTI - due to nephrostomy, cont cipro and will send to IR for repeat nephrostogram +/- dilation.  \par \par f/u in 1 month\par Labs to be drawn tomorrow. \par

## 2020-07-21 NOTE — REVIEW OF SYSTEMS
[Fever] : no fever [Chills] : no chills [Feeling Poorly] : not feeling poorly [Sore Throat] : no sore throat [Feeling Tired] : not feeling tired [Palpitations] : no palpitations [Chest Pain] : no chest pain [Lower Ext Edema] : no extremity edema [Shortness Of Breath] : no shortness of breath [Wheezing] : no wheezing [Cough] : no cough [SOB on Exertion] : no shortness of breath during exertion [Constipation] : no constipation [Abdominal Pain] : no abdominal pain [Vomiting] : no vomiting [Diarrhea] : diarrhea [Heartburn] : no heartburn [Incontinence] : no incontinence [Dysuria] : no dysuria [Nocturia] : no nocturia [Arthralgias] : no arthralgias

## 2020-07-21 NOTE — PHYSICAL EXAM
[General Appearance - Alert] : alert [General Appearance - In No Acute Distress] : in no acute distress [General Appearance - Well Nourished] : well nourished [General Appearance - Well Developed] : well developed [Sclera] : the sclera and conjunctiva were normal [Extraocular Movements] : extraocular movements were intact [Outer Ear] : the ears and nose were normal in appearance [Neck Appearance] : the appearance of the neck was normal [] : no respiratory distress [Respiration, Rhythm And Depth] : normal respiratory rhythm and effort [Heart Sounds Gallop] : no gallops [Heart Sounds] : normal S1 and S2 [Heart Rate And Rhythm] : heart rate was normal and rhythm regular [Edema] : there was no peripheral edema [Abdominal Aorta] : the abdominal aorta was normal [Abdomen Tenderness] : non-tender [Bowel Sounds] : normal bowel sounds [Cervical Lymph Nodes Enlarged Posterior Bilaterally] : posterior cervical [Cervical Lymph Nodes Enlarged Anterior Bilaterally] : anterior cervical [Abnormal Walk] : normal gait [Supraclavicular Lymph Nodes Enlarged Bilaterally] : supraclavicular [Cranial Nerves] : cranial nerves 2-12 were intact [Skin Turgor] : normal skin turgor [Skin Color & Pigmentation] : normal skin color and pigmentation [No Focal Deficits] : no focal deficits [Oriented To Time, Place, And Person] : oriented to person, place, and time [Affect] : the affect was normal [Impaired Insight] : insight and judgment were intact

## 2020-07-21 NOTE — HISTORY OF PRESENT ILLNESS
[FreeTextEntry1] : Mr. Medrano is a 67 yrs old male ESRD secondary to DM2 (HD since 2015, followed by Dr. Bonilla) s/p  donor kidney transplant on 18.\par HCV donor. 40 yrs old male. cold ischemia time 23 hrs. delayed graft function. \par \par PT was readmitted for anemia and hematoma. Required 2 kidney biopsies which revealed few oxalate crystals and borderline rejection for which he was treated with solumedrol 375 and prednisone taper. \par \par Interval events:\par Pt admitted for CHELA and hydronephrosis 2020.  Had nephrostomy tube then NU tube placed.   then admitted for UTI - treated with antibiotics and now on cipro.  Feels better but not back to normal.  [Medical Office: ([facility])___] : at the medical office located in  [Home] : at home, [unfilled] , at the time of the visit. [Patient] : the patient [Self] : self

## 2020-07-22 LAB
ALBUMIN SERPL ELPH-MCNC: 4.3 G/DL
ALP BLD-CCNC: 72 U/L
ALT SERPL-CCNC: 19 U/L
ANION GAP SERPL CALC-SCNC: 13 MMOL/L
AST SERPL-CCNC: 24 U/L
BASOPHILS # BLD AUTO: 0.07 K/UL
BASOPHILS NFR BLD AUTO: 1.2 %
BILIRUB SERPL-MCNC: 0.3 MG/DL
BUN SERPL-MCNC: 24 MG/DL
CALCIUM SERPL-MCNC: 10.3 MG/DL
CHLORIDE SERPL-SCNC: 100 MMOL/L
CO2 SERPL-SCNC: 25 MMOL/L
CREAT SERPL-MCNC: 2.67 MG/DL
CREAT SPEC-SCNC: 189 MG/DL
CREAT/PROT UR: 0.7 RATIO
EOSINOPHIL # BLD AUTO: 0.09 K/UL
EOSINOPHIL NFR BLD AUTO: 1.5 %
GLUCOSE SERPL-MCNC: 166 MG/DL
HCT VFR BLD CALC: 35.3 %
HGB BLD-MCNC: 10.3 G/DL
IMM GRANULOCYTES NFR BLD AUTO: 1.4 %
LDH SERPL-CCNC: 263 U/L
LYMPHOCYTES # BLD AUTO: 2 K/UL
LYMPHOCYTES NFR BLD AUTO: 33.8 %
MAGNESIUM SERPL-MCNC: 1.5 MG/DL
MAN DIFF?: NORMAL
MCHC RBC-ENTMCNC: 29.2 GM/DL
MCHC RBC-ENTMCNC: 29.9 PG
MCV RBC AUTO: 102.3 FL
MONOCYTES # BLD AUTO: 0.44 K/UL
MONOCYTES NFR BLD AUTO: 7.4 %
NEUTROPHILS # BLD AUTO: 3.23 K/UL
NEUTROPHILS NFR BLD AUTO: 54.7 %
PHOSPHATE SERPL-MCNC: 4 MG/DL
PLATELET # BLD AUTO: 170 K/UL
POTASSIUM SERPL-SCNC: 5.2 MMOL/L
PROT SERPL-MCNC: 6.3 G/DL
PROT UR-MCNC: 124 MG/DL
RBC # BLD: 3.45 M/UL
RBC # FLD: 14.2 %
SODIUM SERPL-SCNC: 139 MMOL/L
TACROLIMUS SERPL-MCNC: 4.3 NG/ML
URATE SERPL-MCNC: 5.9 MG/DL
WBC # FLD AUTO: 5.91 K/UL

## 2020-07-26 DIAGNOSIS — Z01.818 ENCOUNTER FOR OTHER PREPROCEDURAL EXAMINATION: ICD-10-CM

## 2020-07-26 LAB
APPEARANCE: ABNORMAL
BACTERIA: NEGATIVE
BILIRUBIN URINE: NEGATIVE
BKV DNA SPEC QL NAA+PROBE: NOT DETECTED COPIES/ML
BLOOD URINE: ABNORMAL
CMV DNA SPEC QL NAA+PROBE: NOT DETECTED IU/ML
COLOR: YELLOW
GLUCOSE QUALITATIVE U: ABNORMAL
HYALINE CASTS: 2 /LPF
KETONES URINE: NEGATIVE
LEUKOCYTE ESTERASE URINE: NEGATIVE
MICROSCOPIC-UA: NORMAL
NITRITE URINE: NEGATIVE
PH URINE: 6
PROTEIN URINE: ABNORMAL
RED BLOOD CELLS URINE: 34 /HPF
SPECIFIC GRAVITY URINE: 1.02
SQUAMOUS EPITHELIAL CELLS: 5 /HPF
UROBILINOGEN URINE: NORMAL
WHITE BLOOD CELLS URINE: 8 /HPF

## 2020-07-27 ENCOUNTER — APPOINTMENT (OUTPATIENT)
Dept: DISASTER EMERGENCY | Facility: CLINIC | Age: 70
End: 2020-07-27

## 2020-07-27 LAB — SARS-COV-2 N GENE NPH QL NAA+PROBE: NOT DETECTED

## 2020-07-29 ENCOUNTER — RESULT REVIEW (OUTPATIENT)
Age: 70
End: 2020-07-29

## 2020-07-29 ENCOUNTER — OUTPATIENT (OUTPATIENT)
Dept: OUTPATIENT SERVICES | Facility: HOSPITAL | Age: 70
LOS: 1 days | End: 2020-07-29
Payer: MEDICARE

## 2020-07-29 VITALS — WEIGHT: 211.64 LBS

## 2020-07-29 DIAGNOSIS — Z98.890 OTHER SPECIFIED POSTPROCEDURAL STATES: Chronic | ICD-10-CM

## 2020-07-29 DIAGNOSIS — Z90.5 ACQUIRED ABSENCE OF KIDNEY: Chronic | ICD-10-CM

## 2020-07-29 DIAGNOSIS — I77.0 ARTERIOVENOUS FISTULA, ACQUIRED: Chronic | ICD-10-CM

## 2020-07-29 DIAGNOSIS — Z94.0 KIDNEY TRANSPLANT STATUS: ICD-10-CM

## 2020-07-29 DIAGNOSIS — Z94.0 KIDNEY TRANSPLANT STATUS: Chronic | ICD-10-CM

## 2020-07-29 PROCEDURE — C1725: CPT

## 2020-07-29 PROCEDURE — C1769: CPT

## 2020-07-29 PROCEDURE — C1894: CPT

## 2020-07-29 PROCEDURE — 50387 CHANGE NEPHROURETERAL CATH: CPT

## 2020-07-29 PROCEDURE — 50706 BALLOON DILATE URTRL STRIX: CPT | Mod: RT

## 2020-07-29 PROCEDURE — C2625: CPT

## 2020-07-29 PROCEDURE — 50706 BALLOON DILATE URTRL STRIX: CPT

## 2020-07-29 PROCEDURE — 50387 CHANGE NEPHROURETERAL CATH: CPT | Mod: 50

## 2020-07-29 RX ORDER — CEFTRIAXONE 500 MG/1
1000 INJECTION, POWDER, FOR SOLUTION INTRAMUSCULAR; INTRAVENOUS ONCE
Refills: 0 | Status: DISCONTINUED | OUTPATIENT
Start: 2020-07-29 | End: 2020-08-13

## 2020-07-29 NOTE — PRE-ANESTHESIA EVALUATION ADULT - LAST ECHOCARDIOGRAM
' 2018 prior to Kidney Transplant ( [facility]); 2016 TTE: EF 55-60%, dilatation of Ao root
' 2018 prior to Kidney Transplant ( [facility]); 2016 TTE: EF 55-60%, dilatation of Ao root

## 2020-07-29 NOTE — PRE-ANESTHESIA EVALUATION ADULT - LAST STRESS TEST
' 2018 prior to Kidney Transplant ( [facility])
' 2018 prior to Kidney Transplant ( [facility])

## 2020-07-29 NOTE — PRE-ANESTHESIA EVALUATION ADULT - NSANTHOSAYNRD_GEN_A_CORE
No. FER screening performed.  STOP BANG Legend: 0-2 = LOW Risk; 3-4 = INTERMEDIATE Risk; 5-8 = HIGH Risk

## 2020-08-10 DIAGNOSIS — Z46.6 ENCOUNTER FOR FITTING AND ADJUSTMENT OF URINARY DEVICE: ICD-10-CM

## 2020-08-10 DIAGNOSIS — N13.1 HYDRONEPHROSIS WITH URETERAL STRICTURE, NOT ELSEWHERE CLASSIFIED: ICD-10-CM

## 2020-08-20 NOTE — DISCHARGE NOTE ADULT - REASON FOR ADMISSION
A&O x4. VSS. Afebrile. Ra LS clear. BS active, passing flatus. Tolerated clear liquids and only needed 5 mg Oxycodone and scheduled Tylenol. At 1400 diet advanced to full liquids will see how pain and nausea is after. SL, NO Zofran needed. UP with SBA. IVF discontinued and encourage oral hydration. Voiding. Phos this am 2.6. plan to discharge home 1-2 days when able to tolerate advanced diet. Will continue to monitor.     anticipation for DDRT

## 2020-09-01 ENCOUNTER — APPOINTMENT (OUTPATIENT)
Dept: TRANSPLANT | Facility: CLINIC | Age: 70
End: 2020-09-01

## 2020-09-01 ENCOUNTER — APPOINTMENT (OUTPATIENT)
Dept: NEPHROLOGY | Facility: CLINIC | Age: 70
End: 2020-09-01
Payer: MEDICARE

## 2020-09-01 VITALS
HEART RATE: 100 BPM | TEMPERATURE: 97.5 F | HEIGHT: 71 IN | BODY MASS INDEX: 29.82 KG/M2 | RESPIRATION RATE: 16 BRPM | SYSTOLIC BLOOD PRESSURE: 138 MMHG | DIASTOLIC BLOOD PRESSURE: 67 MMHG | WEIGHT: 213 LBS | OXYGEN SATURATION: 100 %

## 2020-09-01 PROCEDURE — 99214 OFFICE O/P EST MOD 30 MIN: CPT

## 2020-09-01 RX ORDER — CIPROFLOXACIN HYDROCHLORIDE 250 MG/1
250 TABLET, FILM COATED ORAL
Qty: 14 | Refills: 0 | Status: DISCONTINUED | COMMUNITY
Start: 2020-06-09 | End: 2020-09-01

## 2020-09-01 NOTE — HISTORY OF PRESENT ILLNESS
[FreeTextEntry1] : Mr. Medrano is a 67 yrs old male ESRD secondary to DM2 (HD since 2015, followed by Dr. Bonilla) s/p  donor kidney transplant on 18.\par HCV donor. 40 yrs old male. cold ischemia time 23 hrs. delayed graft function. \par \par PT was readmitted for anemia and hematoma. Required 2 kidney biopsies which revealed few oxalate crystals and borderline rejection for which he was treated with solumedrol 375 and prednisone taper. \par \par Interval events:\par Pt admitted for CHELA and hydronephrosis 2020.  Had nephrostomy tube then NU tube placed.   then admitted for UTI - treated with antibiotics and now on cipro.  Went for ureteral stricture dilation and replacement of NU tube on 8/3.  Occasionally has pain by NU tube site.  Also feels it is slightly pulled out.  Urinating normally per pt.  Creatinine increased to 3.  No increase in BM's.

## 2020-09-01 NOTE — ASSESSMENT
[FreeTextEntry1] : 1. Kidney transplantation - Pts aaron creatinine 1.9 but had CHELA with pyelonephritis. Has had multiple episodes of CHELA. 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  Creatinine has come down to 2.2 but now back up to 3.  Will send pt back to IR for nephrostogram, tube check and repeat dilation of proximal stricture.  Will also check urine culture.  \par 2. Immunosuppression - simulect induction, tacrolimus target 5-7, Envarsus 4 mgs, MMF 500mgs BID.  Does not tolerate higher dose due to diarrhea and leukopenia. \par 3. DM2 - on januvia and glimepiride. BG controlled at home.  A1c at goal. \par 4. HTN - controlled at home. \par 5. HCV - genotype 3a. completed Epclusa. Last vl note detected. \par 6. Anemia - due to CKD/CHELA.  last Hb at goal. \par 7. Chronic diarrhea - on and off on lomotil prn. Has seen GI and had a colonoscopy. Has history of small bowel resection which may be the cause of diarrhea. 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 \par 9. CMV - negative recently\par \par f/u in 1 month\par f/u urine culture

## 2020-09-01 NOTE — PHYSICAL EXAM
[General Appearance - Alert] : alert [General Appearance - In No Acute Distress] : in no acute distress [General Appearance - Well Nourished] : well nourished [General Appearance - Well Developed] : well developed [Sclera] : the sclera and conjunctiva were normal [Extraocular Movements] : extraocular movements were intact [Outer Ear] : the ears and nose were normal in appearance [Neck Appearance] : the appearance of the neck was normal [] : no respiratory distress [Respiration, Rhythm And Depth] : normal respiratory rhythm and effort [Heart Rate And Rhythm] : heart rate was normal and rhythm regular [Heart Sounds] : normal S1 and S2 [Heart Sounds Gallop] : no gallops [Abdominal Aorta] : the abdominal aorta was normal [Edema] : there was no peripheral edema [Bowel Sounds] : normal bowel sounds [Abdomen Tenderness] : non-tender [Cervical Lymph Nodes Enlarged Posterior Bilaterally] : posterior cervical [Cervical Lymph Nodes Enlarged Anterior Bilaterally] : anterior cervical [Supraclavicular Lymph Nodes Enlarged Bilaterally] : supraclavicular [Abnormal Walk] : normal gait [Skin Color & Pigmentation] : normal skin color and pigmentation [Skin Turgor] : normal skin turgor [Cranial Nerves] : cranial nerves 2-12 were intact [No Focal Deficits] : no focal deficits [Oriented To Time, Place, And Person] : oriented to person, place, and time [Impaired Insight] : insight and judgment were intact [Affect] : the affect was normal [FreeTextEntry1] : nephrostomy tube in place.

## 2020-09-02 LAB
APPEARANCE: ABNORMAL
BACTERIA: ABNORMAL
BASOPHILS # BLD AUTO: 0.03 K/UL
BASOPHILS NFR BLD AUTO: 0.4 %
BILIRUBIN URINE: NEGATIVE
BLOOD URINE: ABNORMAL
COLOR: YELLOW
CREAT SPEC-SCNC: 176 MG/DL
CREAT/PROT UR: 0.4 RATIO
EOSINOPHIL # BLD AUTO: 0.06 K/UL
EOSINOPHIL NFR BLD AUTO: 0.7 %
GLUCOSE QUALITATIVE U: NEGATIVE
HCT VFR BLD CALC: 36.9 %
HGB BLD-MCNC: 11 G/DL
HYALINE CASTS: 2 /LPF
IMM GRANULOCYTES NFR BLD AUTO: 2.5 %
KETONES URINE: NEGATIVE
LDH SERPL-CCNC: 188 U/L
LEUKOCYTE ESTERASE URINE: ABNORMAL
LYMPHOCYTES # BLD AUTO: 2.36 K/UL
LYMPHOCYTES NFR BLD AUTO: 29.5 %
MAGNESIUM SERPL-MCNC: 1.7 MG/DL
MAN DIFF?: NORMAL
MCHC RBC-ENTMCNC: 29.8 GM/DL
MCHC RBC-ENTMCNC: 30.4 PG
MCV RBC AUTO: 101.9 FL
MICROSCOPIC-UA: NORMAL
MONOCYTES # BLD AUTO: 0.47 K/UL
MONOCYTES NFR BLD AUTO: 5.9 %
NEUTROPHILS # BLD AUTO: 4.89 K/UL
NEUTROPHILS NFR BLD AUTO: 61 %
NITRITE URINE: POSITIVE
PH URINE: 6
PHOSPHATE SERPL-MCNC: 3.3 MG/DL
PLATELET # BLD AUTO: 155 K/UL
PROT UR-MCNC: 71 MG/DL
PROTEIN URINE: ABNORMAL
RBC # BLD: 3.62 M/UL
RBC # FLD: 14.7 %
RED BLOOD CELLS URINE: 12 /HPF
SPECIFIC GRAVITY URINE: 1.02
SQUAMOUS EPITHELIAL CELLS: 1 /HPF
TACROLIMUS SERPL-MCNC: 15.1 NG/ML
URATE SERPL-MCNC: 8.3 MG/DL
UROBILINOGEN URINE: NORMAL
WBC # FLD AUTO: 8.01 K/UL
WHITE BLOOD CELLS URINE: 566 /HPF

## 2020-09-03 LAB
ALBUMIN SERPL ELPH-MCNC: 4.4 G/DL
ALBUMIN SERPL ELPH-MCNC: 4.4 G/DL
ALP BLD-CCNC: 66 U/L
ALP BLD-CCNC: 67 U/L
ALT SERPL-CCNC: 16 U/L
ALT SERPL-CCNC: 16 U/L
ANION GAP SERPL CALC-SCNC: 12 MMOL/L
ANION GAP SERPL CALC-SCNC: 14 MMOL/L
APPEARANCE: ABNORMAL
AST SERPL-CCNC: 19 U/L
AST SERPL-CCNC: 21 U/L
BACTERIA: NEGATIVE
BASOPHILS # BLD AUTO: 0.04 K/UL
BASOPHILS NFR BLD AUTO: 0.4 %
BILIRUB SERPL-MCNC: 0.2 MG/DL
BILIRUB SERPL-MCNC: 0.4 MG/DL
BILIRUBIN URINE: NEGATIVE
BKV DNA SPEC QL NAA+PROBE: NOT DETECTED COPIES/ML
BLOOD URINE: ABNORMAL
BUN SERPL-MCNC: 28 MG/DL
BUN SERPL-MCNC: 29 MG/DL
CALCIUM SERPL-MCNC: 10.2 MG/DL
CALCIUM SERPL-MCNC: 10.4 MG/DL
CHLORIDE SERPL-SCNC: 102 MMOL/L
CHLORIDE SERPL-SCNC: 103 MMOL/L
CMV DNA SPEC QL NAA+PROBE: NOT DETECTED IU/ML
CO2 SERPL-SCNC: 24 MMOL/L
CO2 SERPL-SCNC: 25 MMOL/L
COLOR: NORMAL
CREAT SERPL-MCNC: 3.03 MG/DL
CREAT SERPL-MCNC: 3.06 MG/DL
CREAT SPEC-SCNC: 157 MG/DL
CREAT/PROT UR: 0.9 RATIO
EOSINOPHIL # BLD AUTO: 0.07 K/UL
EOSINOPHIL NFR BLD AUTO: 0.7 %
GLUCOSE QUALITATIVE U: NEGATIVE
GLUCOSE SERPL-MCNC: 145 MG/DL
GLUCOSE SERPL-MCNC: 180 MG/DL
HCT VFR BLD CALC: 36.4 %
HGB BLD-MCNC: 11.3 G/DL
HYALINE CASTS: 2 /LPF
IMM GRANULOCYTES NFR BLD AUTO: 2.9 %
KETONES URINE: NEGATIVE
LDH SERPL-CCNC: 171 U/L
LEUKOCYTE ESTERASE URINE: ABNORMAL
LYMPHOCYTES # BLD AUTO: 2.38 K/UL
LYMPHOCYTES NFR BLD AUTO: 22.9 %
MAGNESIUM SERPL-MCNC: 1.3 MG/DL
MAN DIFF?: NORMAL
MCHC RBC-ENTMCNC: 31 GM/DL
MCHC RBC-ENTMCNC: 31.2 PG
MCV RBC AUTO: 100.6 FL
MICROSCOPIC-UA: NORMAL
MONOCYTES # BLD AUTO: 0.8 K/UL
MONOCYTES NFR BLD AUTO: 7.7 %
NEUTROPHILS # BLD AUTO: 6.8 K/UL
NEUTROPHILS NFR BLD AUTO: 65.4 %
NITRITE URINE: NEGATIVE
PH URINE: 6
PHOSPHATE SERPL-MCNC: 3.4 MG/DL
PLATELET # BLD AUTO: 141 K/UL
POTASSIUM SERPL-SCNC: 5.2 MMOL/L
POTASSIUM SERPL-SCNC: 5.8 MMOL/L
PROT SERPL-MCNC: 6.5 G/DL
PROT SERPL-MCNC: 6.6 G/DL
PROT UR-MCNC: 137 MG/DL
PROTEIN URINE: ABNORMAL
RBC # BLD: 3.62 M/UL
RBC # FLD: 14.7 %
RED BLOOD CELLS URINE: 43 /HPF
SODIUM SERPL-SCNC: 140 MMOL/L
SODIUM SERPL-SCNC: 140 MMOL/L
SPECIFIC GRAVITY URINE: 1.02
SQUAMOUS EPITHELIAL CELLS: 1 /HPF
TACROLIMUS SERPL-MCNC: 6.9 NG/ML
UROBILINOGEN URINE: NORMAL
WBC # FLD AUTO: 10.39 K/UL
WHITE BLOOD CELLS URINE: 59 /HPF

## 2020-09-04 LAB
BKV DNA SPEC QL NAA+PROBE: NOT DETECTED COPIES/ML
CMV DNA SPEC QL NAA+PROBE: NOT DETECTED IU/ML

## 2020-09-07 ENCOUNTER — OUTPATIENT (OUTPATIENT)
Dept: OUTPATIENT SERVICES | Facility: HOSPITAL | Age: 70
LOS: 1 days | End: 2020-09-07
Payer: MEDICARE

## 2020-09-07 DIAGNOSIS — Z98.890 OTHER SPECIFIED POSTPROCEDURAL STATES: Chronic | ICD-10-CM

## 2020-09-07 DIAGNOSIS — Z90.5 ACQUIRED ABSENCE OF KIDNEY: Chronic | ICD-10-CM

## 2020-09-07 DIAGNOSIS — Z11.59 ENCOUNTER FOR SCREENING FOR OTHER VIRAL DISEASES: ICD-10-CM

## 2020-09-07 DIAGNOSIS — I77.0 ARTERIOVENOUS FISTULA, ACQUIRED: Chronic | ICD-10-CM

## 2020-09-07 DIAGNOSIS — Z94.0 KIDNEY TRANSPLANT STATUS: Chronic | ICD-10-CM

## 2020-09-07 LAB — SARS-COV-2 RNA SPEC QL NAA+PROBE: SIGNIFICANT CHANGE UP

## 2020-09-07 PROCEDURE — U0003: CPT

## 2020-09-09 LAB — BACTERIA UR CULT: ABNORMAL

## 2020-09-10 ENCOUNTER — RESULT REVIEW (OUTPATIENT)
Age: 70
End: 2020-09-10

## 2020-09-10 ENCOUNTER — OUTPATIENT (OUTPATIENT)
Dept: OUTPATIENT SERVICES | Facility: HOSPITAL | Age: 70
LOS: 1 days | End: 2020-09-10
Payer: MEDICARE

## 2020-09-10 VITALS
HEIGHT: 70 IN | TEMPERATURE: 99 F | WEIGHT: 214.95 LBS | DIASTOLIC BLOOD PRESSURE: 73 MMHG | OXYGEN SATURATION: 100 % | SYSTOLIC BLOOD PRESSURE: 127 MMHG | HEART RATE: 83 BPM | RESPIRATION RATE: 16 BRPM

## 2020-09-10 DIAGNOSIS — Z98.890 OTHER SPECIFIED POSTPROCEDURAL STATES: Chronic | ICD-10-CM

## 2020-09-10 DIAGNOSIS — Z90.5 ACQUIRED ABSENCE OF KIDNEY: Chronic | ICD-10-CM

## 2020-09-10 DIAGNOSIS — Z94.0 KIDNEY TRANSPLANT STATUS: Chronic | ICD-10-CM

## 2020-09-10 DIAGNOSIS — I77.0 ARTERIOVENOUS FISTULA, ACQUIRED: Chronic | ICD-10-CM

## 2020-09-10 DIAGNOSIS — Z94.0 KIDNEY TRANSPLANT STATUS: ICD-10-CM

## 2020-09-10 PROCEDURE — 50431 NJX PX NFROSGRM &/URTRGRM: CPT

## 2020-09-10 PROCEDURE — 50431 NJX PX NFROSGRM &/URTRGRM: CPT | Mod: RT

## 2020-09-10 NOTE — ASU DISCHARGE PLAN (ADULT/PEDIATRIC) - ASU DC SPECIAL INSTRUCTIONSFT
Please feel free to contact us at [phone] if any problems arise. After 6PM, Monday through Friday, on weekends and on holidays, please call [phone] and ask for the radiology resident on call to be paged.

## 2020-09-10 NOTE — ASU PATIENT PROFILE, ADULT - PSH
AV fistula  2013/ left forearm  H/O ileostomy  '    for 3 months. s/p Reversal  Kidney transplant recipient  2018  @ [facility] :  +  Hep C Donor  S/p nephrectomy  right 12/10/2015   benign  S/p nephrectomy  left 9/15/2015   + Cancer  S/P right knee arthroscopy

## 2020-09-18 DIAGNOSIS — Z46.6 ENCOUNTER FOR FITTING AND ADJUSTMENT OF URINARY DEVICE: ICD-10-CM

## 2020-10-14 ENCOUNTER — APPOINTMENT (OUTPATIENT)
Dept: NEPHROLOGY | Facility: CLINIC | Age: 70
End: 2020-10-14
Payer: MEDICARE

## 2020-10-14 VITALS
HEIGHT: 71 IN | WEIGHT: 215 LBS | OXYGEN SATURATION: 100 % | HEART RATE: 70 BPM | TEMPERATURE: 98 F | SYSTOLIC BLOOD PRESSURE: 134 MMHG | DIASTOLIC BLOOD PRESSURE: 72 MMHG | RESPIRATION RATE: 12 BRPM | BODY MASS INDEX: 30.1 KG/M2

## 2020-10-14 PROCEDURE — 99214 OFFICE O/P EST MOD 30 MIN: CPT

## 2020-10-14 RX ORDER — SODIUM ZIRCONIUM CYCLOSILICATE 10 G/10G
10 POWDER, FOR SUSPENSION ORAL DAILY
Qty: 3 | Refills: 0 | Status: DISCONTINUED | COMMUNITY
Start: 2020-09-02 | End: 2020-10-14

## 2020-10-14 RX ORDER — CIPROFLOXACIN HYDROCHLORIDE 500 MG/1
500 TABLET, FILM COATED ORAL
Qty: 7 | Refills: 0 | Status: DISCONTINUED | COMMUNITY
Start: 2020-09-03 | End: 2020-10-14

## 2020-10-14 NOTE — CONSULT LETTER
[Please see my note below.] : Please see my note below. [Consult Letter:] : I had the pleasure of evaluating your patient, [unfilled]. [Dear  ___] : Dear  [unfilled], [Consult Closing:] : Thank you very much for allowing me to participate in the care of this patient.  If you have any questions, please do not hesitate to contact me. [Sincerely,] : Sincerely, [FreeTextEntry3] : Arian Bhakta, DO

## 2020-10-14 NOTE — ASSESSMENT
[FreeTextEntry1] : 1. Kidney transplantation - Pts aaron creatinine 1.9 but had CHELA with pyelonephritis. Has had multiple episodes of CHELA. 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  Creatinine has come down to 2.2 but now back up to 3.  Will send pt back to IR for nephrostogram and repeat dilation of proximal stricture.  If renal function does not improve will bring him back for re-listing. \par 2. Immunosuppression - simulect induction, tacrolimus target 5-7, Envarsus 4 mgs, MMF 500mgs BID.  Does not tolerate higher dose due to diarrhea and leukopenia. \par 3. DM2 - on januvia and glimepiride. BG controlled at home.  A1c at goal. \par 4. HTN - controlled at home. \par 5. HCV - genotype 3a. completed Epclusa. Last vl note detected. \par 6. Anemia - due to CKD/CHELA.  last Hb at goal. \par 7. Chronic diarrhea - on and off on lomotil prn. Has seen GI and had a colonoscopy. Has history of small bowel resection which may be the cause of diarrhea. 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 \par 9. CMV - negative recently\par \par f/u in 2 months\par Pt following with Dr. Bonilla.

## 2020-10-14 NOTE — REVIEW OF SYSTEMS
[Diarrhea] : diarrhea [Fever] : no fever [Feeling Poorly] : not feeling poorly [Chills] : no chills [Sore Throat] : no sore throat [Feeling Tired] : not feeling tired [Chest Pain] : no chest pain [Lower Ext Edema] : no extremity edema [Palpitations] : no palpitations [Cough] : no cough [Shortness Of Breath] : no shortness of breath [Wheezing] : no wheezing [SOB on Exertion] : no shortness of breath during exertion [Abdominal Pain] : no abdominal pain [Vomiting] : no vomiting [Constipation] : no constipation [Heartburn] : no heartburn [Dysuria] : no dysuria [Incontinence] : no incontinence [Nocturia] : no nocturia [Arthralgias] : no arthralgias

## 2020-10-14 NOTE — HISTORY OF PRESENT ILLNESS
[FreeTextEntry1] : Mr. Medrano is a 67 yrs old male ESRD secondary to DM2 (HD since 2015, followed by Dr. Bonilla) s/p  donor kidney transplant on 18.\par HCV donor. 40 yrs old male. cold ischemia time 23 hrs. delayed graft function. \par \par PT was readmitted for anemia and hematoma. Required 2 kidney biopsies which revealed few oxalate crystals and borderline rejection for which he was treated with solumedrol 375 and prednisone taper. \par \par Pt admitted for CHELA and hydronephrosis 2020.  Had nephrostomy tube then NU tube placed.   then admitted for UTI - treated with antibiotics and now on cipro.  Went for ureteral stricture dilation and replacement of NU tube on 8/3/20.\par  \par Interval events:\par Pt was treated for UTI in September.  Feels well.  Pt had NU tube check in September, was not changed.  Same amount of chronic diarrhea.  Blood pressure and sugar controlled.

## 2020-10-14 NOTE — PHYSICAL EXAM
[General Appearance - Alert] : alert [General Appearance - Well Developed] : well developed [General Appearance - In No Acute Distress] : in no acute distress [General Appearance - Well Nourished] : well nourished [Sclera] : the sclera and conjunctiva were normal [Extraocular Movements] : extraocular movements were intact [Outer Ear] : the ears and nose were normal in appearance [Neck Appearance] : the appearance of the neck was normal [] : no respiratory distress [Respiration, Rhythm And Depth] : normal respiratory rhythm and effort [Heart Sounds Gallop] : no gallops [Heart Rate And Rhythm] : heart rate was normal and rhythm regular [Heart Sounds] : normal S1 and S2 [Edema] : there was no peripheral edema [Abdominal Aorta] : the abdominal aorta was normal [Bowel Sounds] : normal bowel sounds [Abdomen Tenderness] : non-tender [Cervical Lymph Nodes Enlarged Posterior Bilaterally] : posterior cervical [Cervical Lymph Nodes Enlarged Anterior Bilaterally] : anterior cervical [Abnormal Walk] : normal gait [Supraclavicular Lymph Nodes Enlarged Bilaterally] : supraclavicular [Skin Color & Pigmentation] : normal skin color and pigmentation [Cranial Nerves] : cranial nerves 2-12 were intact [Skin Turgor] : normal skin turgor [No Focal Deficits] : no focal deficits [Impaired Insight] : insight and judgment were intact [Oriented To Time, Place, And Person] : oriented to person, place, and time [Affect] : the affect was normal [FreeTextEntry1] : nephrostomy tube in place.

## 2020-10-15 LAB
25(OH)D3 SERPL-MCNC: 34.4 NG/ML
ALBUMIN SERPL ELPH-MCNC: 4.1 G/DL
ALP BLD-CCNC: 67 U/L
ALT SERPL-CCNC: 13 U/L
ANION GAP SERPL CALC-SCNC: 17 MMOL/L
APPEARANCE: ABNORMAL
AST SERPL-CCNC: 21 U/L
BACTERIA: NEGATIVE
BASOPHILS # BLD AUTO: 0.05 K/UL
BASOPHILS NFR BLD AUTO: 0.6 %
BILIRUB SERPL-MCNC: 0.2 MG/DL
BILIRUBIN URINE: NEGATIVE
BLOOD URINE: NORMAL
BUN SERPL-MCNC: 26 MG/DL
CALCIUM SERPL-MCNC: 10.2 MG/DL
CALCIUM SERPL-MCNC: 10.2 MG/DL
CHLORIDE SERPL-SCNC: 101 MMOL/L
CHOLEST SERPL-MCNC: 137 MG/DL
CHOLEST/HDLC SERPL: 2.3 RATIO
CO2 SERPL-SCNC: 22 MMOL/L
COLOR: NORMAL
CREAT SERPL-MCNC: 3.08 MG/DL
CREAT SPEC-SCNC: 198 MG/DL
CREAT/PROT UR: 0.3 RATIO
EOSINOPHIL # BLD AUTO: 0.09 K/UL
EOSINOPHIL NFR BLD AUTO: 1.2 %
ESTIMATED AVERAGE GLUCOSE: 148 MG/DL
GLUCOSE QUALITATIVE U: NEGATIVE
GLUCOSE SERPL-MCNC: 164 MG/DL
HBA1C MFR BLD HPLC: 6.8 %
HCT VFR BLD CALC: 37.9 %
HDLC SERPL-MCNC: 59 MG/DL
HGB BLD-MCNC: 11.3 G/DL
HYALINE CASTS: 1 /LPF
IMM GRANULOCYTES NFR BLD AUTO: 1.4 %
KETONES URINE: NEGATIVE
LDH SERPL-CCNC: 217 U/L
LDLC SERPL CALC-MCNC: 46 MG/DL
LEUKOCYTE ESTERASE URINE: ABNORMAL
LYMPHOCYTES # BLD AUTO: 1.85 K/UL
LYMPHOCYTES NFR BLD AUTO: 24 %
MAGNESIUM SERPL-MCNC: 1.4 MG/DL
MAN DIFF?: NORMAL
MCHC RBC-ENTMCNC: 29.8 GM/DL
MCHC RBC-ENTMCNC: 30 PG
MCV RBC AUTO: 100.5 FL
MICROSCOPIC-UA: NORMAL
MONOCYTES # BLD AUTO: 0.6 K/UL
MONOCYTES NFR BLD AUTO: 7.8 %
NEUTROPHILS # BLD AUTO: 5.02 K/UL
NEUTROPHILS NFR BLD AUTO: 65 %
NITRITE URINE: POSITIVE
PARATHYROID HORMONE INTACT: 49 PG/ML
PH URINE: 6
PHOSPHATE SERPL-MCNC: 3.6 MG/DL
PLATELET # BLD AUTO: 172 K/UL
POTASSIUM SERPL-SCNC: 5.1 MMOL/L
PROT SERPL-MCNC: 6.8 G/DL
PROT UR-MCNC: 60 MG/DL
PROTEIN URINE: ABNORMAL
RBC # BLD: 3.77 M/UL
RBC # FLD: 14.2 %
RED BLOOD CELLS URINE: 3 /HPF
SODIUM SERPL-SCNC: 139 MMOL/L
SPECIFIC GRAVITY URINE: 1.02
SQUAMOUS EPITHELIAL CELLS: 0 /HPF
TACROLIMUS SERPL-MCNC: 5.9 NG/ML
TRIGL SERPL-MCNC: 160 MG/DL
URATE SERPL-MCNC: 8.3 MG/DL
UROBILINOGEN URINE: NORMAL
WBC # FLD AUTO: 7.72 K/UL
WHITE BLOOD CELLS URINE: 168 /HPF

## 2020-10-16 LAB
BKV DNA SPEC QL NAA+PROBE: NOT DETECTED COPIES/ML
CMV DNA SPEC QL NAA+PROBE: NOT DETECTED IU/ML

## 2020-10-24 ENCOUNTER — OUTPATIENT (OUTPATIENT)
Dept: OUTPATIENT SERVICES | Facility: HOSPITAL | Age: 70
LOS: 1 days | End: 2020-10-24
Payer: MEDICARE

## 2020-10-24 DIAGNOSIS — Z90.5 ACQUIRED ABSENCE OF KIDNEY: Chronic | ICD-10-CM

## 2020-10-24 DIAGNOSIS — I77.0 ARTERIOVENOUS FISTULA, ACQUIRED: Chronic | ICD-10-CM

## 2020-10-24 DIAGNOSIS — Z11.59 ENCOUNTER FOR SCREENING FOR OTHER VIRAL DISEASES: ICD-10-CM

## 2020-10-24 DIAGNOSIS — Z94.0 KIDNEY TRANSPLANT STATUS: Chronic | ICD-10-CM

## 2020-10-24 DIAGNOSIS — Z98.890 OTHER SPECIFIED POSTPROCEDURAL STATES: Chronic | ICD-10-CM

## 2020-10-24 LAB — SARS-COV-2 RNA SPEC QL NAA+PROBE: SIGNIFICANT CHANGE UP

## 2020-10-24 PROCEDURE — U0003: CPT

## 2020-10-26 ENCOUNTER — EMERGENCY (EMERGENCY)
Facility: HOSPITAL | Age: 70
LOS: 1 days | Discharge: HOME | End: 2020-10-26
Attending: EMERGENCY MEDICINE
Payer: MEDICARE

## 2020-10-26 ENCOUNTER — APPOINTMENT (OUTPATIENT)
Dept: TRANSPLANT | Facility: CLINIC | Age: 70
End: 2020-10-26
Payer: COMMERCIAL

## 2020-10-26 ENCOUNTER — LABORATORY RESULT (OUTPATIENT)
Age: 70
End: 2020-10-26

## 2020-10-26 ENCOUNTER — APPOINTMENT (OUTPATIENT)
Dept: NEPHROLOGY | Facility: CLINIC | Age: 70
End: 2020-10-26
Payer: COMMERCIAL

## 2020-10-26 ENCOUNTER — NON-APPOINTMENT (OUTPATIENT)
Age: 70
End: 2020-10-26

## 2020-10-26 VITALS
OXYGEN SATURATION: 98 % | HEIGHT: 70 IN | DIASTOLIC BLOOD PRESSURE: 81 MMHG | TEMPERATURE: 98 F | SYSTOLIC BLOOD PRESSURE: 159 MMHG | WEIGHT: 214.95 LBS | RESPIRATION RATE: 18 BRPM | HEART RATE: 81 BPM

## 2020-10-26 VITALS
DIASTOLIC BLOOD PRESSURE: 69 MMHG | WEIGHT: 218 LBS | BODY MASS INDEX: 30.52 KG/M2 | TEMPERATURE: 96.8 F | HEIGHT: 71 IN | SYSTOLIC BLOOD PRESSURE: 126 MMHG | OXYGEN SATURATION: 98 % | RESPIRATION RATE: 15 BRPM | HEART RATE: 81 BPM

## 2020-10-26 DIAGNOSIS — Z90.5 ACQUIRED ABSENCE OF KIDNEY: Chronic | ICD-10-CM

## 2020-10-26 DIAGNOSIS — Z98.890 OTHER SPECIFIED POSTPROCEDURAL STATES: Chronic | ICD-10-CM

## 2020-10-26 DIAGNOSIS — I77.0 ARTERIOVENOUS FISTULA, ACQUIRED: Chronic | ICD-10-CM

## 2020-10-26 DIAGNOSIS — Z94.0 KIDNEY TRANSPLANT STATUS: Chronic | ICD-10-CM

## 2020-10-26 PROCEDURE — 99072 ADDL SUPL MATRL&STAF TM PHE: CPT

## 2020-10-26 PROCEDURE — 99214 OFFICE O/P EST MOD 30 MIN: CPT

## 2020-10-26 PROCEDURE — 93010 ELECTROCARDIOGRAM REPORT: CPT

## 2020-10-26 PROCEDURE — 99284 EMERGENCY DEPT VISIT MOD MDM: CPT | Mod: 25,GC

## 2020-10-26 NOTE — REASON FOR VISIT
[Initial] : an initial visit for [End-Stage Renal Disease] : end-stage renal disease [Kidney Transplant Evaluation] : kidney transplant evaluation

## 2020-10-26 NOTE — ASSESSMENT
[Good candidate] : a good candidate. We should proceed with our protocol for evaluation for kidney transplantation. [FreeTextEntry1] : 69yo man with decreased kidney graft function for consideration of second kidney transplant, has living donor\par Patient is older however is quite functional and still working.\par \par 1) cardiac evaluation\par 2) ?requires/consideration of graft nephrectomy given ongoing stricture/infectious risk

## 2020-10-26 NOTE — PHYSICAL EXAM
[Well Developed] : well developed [Soft] : soft [Non-tender] : non-tender [] : right dorsalis pedis palpable [Normal] : normal [TextBox_3] : looks younger than stated age [TextBox_25] : toth scarred - previous midline laparotomy, stoma(s?) (left and right), incisional hernia ~4cm reducible nontender, right iliac fossa oblique incision for kidney transplant [TextBox_34] : no peripheral edema

## 2020-10-26 NOTE — HISTORY OF PRESENT ILLNESS
[Diabetes Mellitus] : Diabetes Mellitus [Other: ___] : [unfilled] [Previous Kidney Transplant] : previous kidney transplant [Cardiac History] : no cardiac history [Blood Transfusion] : prior blood transfusion [Claudication/Angina] : no claudication/angina [Hx of DVT/Thrombosis/Miscarriage] : no history of dvt/thrombosis/miscarriage [Diabetes] : no diabetes [TextBox_24] : 01/00 [TextBox_28] : 01/90 [TextBox_30] : few blocks, can walk up 2 flights of stairs [de-identified] : Mr. Medrano is a 67 yrs old male with previous  donor kidney transplant (18), never had good graft function, best Cr 1.9\par HCV donor. 40 yrs old male. cold ischemia time 23 hrs. delayed graft function. \par Required 2 kidney biopsies which revealed few oxalate crystals and borderline rejection for which he was treated with solumedrol 375 and prednisone taper. \par Admitted with hydronephrosis 2020. Had nephrostomy tube then NU tube placed. then admitted for UTI - treated with antibiotics and now on cipro. Went for ureteral stricture dilation and replacement of NU tube on 8/3/20. To be seen by Dr Ion Beebe 10/27/20, for nephroureterogram +/- removal\par \par Immunosuppression:  on 4mg envarsus, MMF 500mg bid, prednisone 5mg\par \par Urinating normal amounts\par \par reports of intermittent chronic diarrhea, colonoscopy 2019 clear\par \par Initial ESRD secondary to DM2 (HD since 2015, followed by Dr. Bonilla) \par \par PMHx:\par HCV - from donor, treated and cleared\par denies cardiac, respiratory, neurological, psychiatric issues\par denies covid symptoms or infection\par \par Past surgery\par bilateral nephrectomies\par bowel obstruction (adhesive?) requiring resection and formation of stoma\par stoma reversal (states has had diarrhea since)\par kidney transplant (right iliac fossa)\par graft nephrostomy/NU tube in situ\par \par Meds reviewed\par \par SHx\par lives with wife\par daughter lives separately\par works part time -  - mostly paper work\par no alcohol\par  no smoking\par no drugs\par \par potential living donor - friend

## 2020-10-27 ENCOUNTER — OUTPATIENT (OUTPATIENT)
Dept: OUTPATIENT SERVICES | Facility: HOSPITAL | Age: 70
LOS: 1 days | End: 2020-10-27
Payer: MEDICARE

## 2020-10-27 ENCOUNTER — RESULT REVIEW (OUTPATIENT)
Age: 70
End: 2020-10-27

## 2020-10-27 VITALS
HEART RATE: 73 BPM | DIASTOLIC BLOOD PRESSURE: 72 MMHG | SYSTOLIC BLOOD PRESSURE: 134 MMHG | TEMPERATURE: 98 F | RESPIRATION RATE: 19 BRPM

## 2020-10-27 VITALS
RESPIRATION RATE: 16 BRPM | SYSTOLIC BLOOD PRESSURE: 153 MMHG | OXYGEN SATURATION: 98 % | HEART RATE: 76 BPM | DIASTOLIC BLOOD PRESSURE: 69 MMHG | TEMPERATURE: 98 F

## 2020-10-27 VITALS
SYSTOLIC BLOOD PRESSURE: 137 MMHG | RESPIRATION RATE: 17 BRPM | HEART RATE: 77 BPM | OXYGEN SATURATION: 100 % | DIASTOLIC BLOOD PRESSURE: 79 MMHG

## 2020-10-27 DIAGNOSIS — I77.0 ARTERIOVENOUS FISTULA, ACQUIRED: Chronic | ICD-10-CM

## 2020-10-27 DIAGNOSIS — Z94.0 KIDNEY TRANSPLANT STATUS: Chronic | ICD-10-CM

## 2020-10-27 DIAGNOSIS — Z98.890 OTHER SPECIFIED POSTPROCEDURAL STATES: Chronic | ICD-10-CM

## 2020-10-27 DIAGNOSIS — Z94.0 KIDNEY TRANSPLANT STATUS: ICD-10-CM

## 2020-10-27 DIAGNOSIS — Z90.5 ACQUIRED ABSENCE OF KIDNEY: Chronic | ICD-10-CM

## 2020-10-27 LAB
ANION GAP SERPL CALC-SCNC: 12 MMOL/L — SIGNIFICANT CHANGE UP (ref 5–17)
BUN SERPL-MCNC: 31 MG/DL — HIGH (ref 7–23)
CALCIUM SERPL-MCNC: 10.7 MG/DL — HIGH (ref 8.4–10.5)
CHLORIDE SERPL-SCNC: 99 MMOL/L — SIGNIFICANT CHANGE UP (ref 96–108)
CO2 SERPL-SCNC: 27 MMOL/L — SIGNIFICANT CHANGE UP (ref 22–31)
CREAT SERPL-MCNC: 3.35 MG/DL — HIGH (ref 0.5–1.3)
GLUCOSE SERPL-MCNC: 118 MG/DL — HIGH (ref 70–99)
POTASSIUM SERPL-MCNC: 5.1 MMOL/L — SIGNIFICANT CHANGE UP (ref 3.5–5.3)
POTASSIUM SERPL-SCNC: 5.1 MMOL/L — SIGNIFICANT CHANGE UP (ref 3.5–5.3)
SODIUM SERPL-SCNC: 138 MMOL/L — SIGNIFICANT CHANGE UP (ref 135–145)

## 2020-10-27 PROCEDURE — 99284 EMERGENCY DEPT VISIT MOD MDM: CPT | Mod: 25

## 2020-10-27 PROCEDURE — 80048 BASIC METABOLIC PNL TOTAL CA: CPT

## 2020-10-27 PROCEDURE — C1887: CPT

## 2020-10-27 PROCEDURE — 50387 CHANGE NEPHROURETERAL CATH: CPT | Mod: 50

## 2020-10-27 PROCEDURE — C1725: CPT

## 2020-10-27 PROCEDURE — 50387 CHANGE NEPHROURETERAL CATH: CPT

## 2020-10-27 PROCEDURE — C1894: CPT

## 2020-10-27 PROCEDURE — 50706 BALLOON DILATE URTRL STRIX: CPT | Mod: RT

## 2020-10-27 PROCEDURE — 93005 ELECTROCARDIOGRAM TRACING: CPT

## 2020-10-27 PROCEDURE — 50706 BALLOON DILATE URTRL STRIX: CPT

## 2020-10-27 PROCEDURE — C1769: CPT

## 2020-10-27 PROCEDURE — C2625: CPT

## 2020-10-27 RX ORDER — SODIUM CHLORIDE 9 MG/ML
1000 INJECTION, SOLUTION INTRAVENOUS
Refills: 0 | Status: DISCONTINUED | OUTPATIENT
Start: 2020-10-27 | End: 2020-11-10

## 2020-10-27 NOTE — ED PROVIDER NOTE - RESPIRATORY, MLM
Breath sounds clear and equal bilaterally. **ATTENDING ADDENDUM (Dr. John Daniel): NO wheezing, rales, rhonchi, crackles, stridor, drooling, retractions, nasal flaring, or tripoding.

## 2020-10-27 NOTE — PRE-ANESTHESIA EVALUATION ADULT - LAST ECHOCARDIOGRAM
' 2018 prior to Kidney Transplant ( [facility]); 2016 TTE: EF 55-60%, dilatation of Ao root

## 2020-10-27 NOTE — ED ADULT NURSE NOTE - OBJECTIVE STATEMENT
Pt is a 70 yr old male with pmh of HTN, DM, and renal CA with kidney transplant two years ago and currently on waiting list for another kidney transplant due to current one failing sent in by neurologist for high potassium level (6.5). Labs were drawn 10/26 at 1400. Patient is a/o x 3 calm cooperative. Denies cough, chills, fever, sob. No cardiac or respiratory issues. Patient has a right nephrostomy tube (due to come out 10/27) and left  forearm fistula not currently in use (bruit and thrill positive). Vitals within normal limits. Labs to be sent for potassium recheck. Pt on cardiac monitor.

## 2020-10-27 NOTE — ED PROVIDER NOTE - AGGRAVATING FACTORS
**ATTENDING ADDENDUM (Dr. John Daniel): NO syncope or near-syncope. NO chest pain, palpitations, shortness of breath, dyspnea on exertion, abdominal pain or back pain. NO malaise, generalized weakness, fatigue, or distress. NO movement-associated, pleuritic, reproducible, positional, or exertional component to the symptoms./none

## 2020-10-27 NOTE — ED PROVIDER NOTE - GENITOURINARY [+], MLM
**ATTENDING ADDENDUM (Dr. John Daniel): POSITIVE known indwelling nephrostomy tube. POSITIVE history of renal cancer s/p bilateral nephrectomy, s/p ESRD, s/p HD, s/p renal transplantation, with known failing transplant

## 2020-10-27 NOTE — ED PROVIDER NOTE - OBJECTIVE STATEMENT
69 yo M with ho of renal ca sp bl nephrectomy requiring renal transplant in 2018 which he has had issues with since (possible rejection?), HTN, DM presenting for elevated potassium found on outpatient labs. Otherwise denies any symptoms. Is making urine. Has a nephrostomy tube in place for urinary obstruction last year and was set to be taken out earlier this year but COVID pushed back the removal. 71 yo M with ho of renal ca sp bl nephrectomy requiring renal transplant in 2018 which he has had issues with since (possible rejection?), HTN, DM presenting for elevated potassium found on outpatient labs. Otherwise denies any symptoms. Is making urine. Has a nephrostomy tube in place for urinary obstruction last year and was set to be taken out earlier this year but COVID pushed back the removal.  **ATTENDING ADDENDUM (Dr. John Daniel): I attest that I have directly and personally interviewed and examined this patient and elicited a comparable history of present illness and review of systems as documented, along with my EM resident. I attest that I have made significant contributions to the documentation where necessary and as noted in the EMR.

## 2020-10-27 NOTE — PRE PROCEDURE NOTE - GENERAL PROCEDURE NAME
Transplant nephrostogram with possible ureteroplasty, possible nephroureteral stent placement possible nephrostomy tube and ureteral stent placement.

## 2020-10-27 NOTE — ED PROCEDURE NOTE - PROCEDURE ADDITIONAL DETAILS
**ATTENDING ADDENDUM (Dr. John Daniel): I was present during the key portions of the procedure and immediately available during the entire procedure. Agree with plan and management. Anticipatory guidance provided.

## 2020-10-27 NOTE — ED PROVIDER NOTE - ATTENDING CONTRIBUTION TO CARE
**ATTENDING ADDENDUM (Dr. John Daniel): I attest that I have directly examined this patient and reviewed and formulated the diagnostic and therapeutic management plan in collaboration with the EM resident. Please see MDM note and remainder of EMR for findings from CC, HPI, ROS, and PE. (Frankel)

## 2020-10-27 NOTE — ED PROVIDER NOTE - OTHER RECORDS SUMMARY FREE TEXT FOR MDM OBTAINED AND REVIEWED OLD RECORDS QUESTION
**ATTENDING ADDENDUM (Dr. John Daniel): HIE reviewed to confirm outpatient serology (serum potassium noted as 6.2 mEq/L on prior testing)

## 2020-10-27 NOTE — ASU DISCHARGE PLAN (ADULT/PEDIATRIC) - ASU DC SPECIAL INSTRUCTIONSFT
Discharge Instructions  - You have had a dilation of the ureter and replacement of a nephroureteral tube. Please make an appointment to follow up with Dr. Yao.  - Keep the area clean and dry.  - Do not soak in a tub or pool with the drain.  - Do not put traction on the tube and be careful that the drain does not get accidentally dislodged or kinked.  - your tube was left capped. Please uncap the tube and connect to a bag and let your provider know if you experience significant pain or develop fever.   - You may resume your normal diet.  - You may resume your normal medications.  - It is normal to experience some pain over the site for the next few days. You may take apply ice to the area (20 minutes on, 20 minutes off) and take Tylenol for that pain. Do not take more frequently than every 6 hours and do not exceed more than 3000mg of Tylenol in a 24 hour period.    Notify your primary physician and/or Interventional Radiology IMMEDIATELY if you experience any of the following       - Fever of 101F or 38C       - Chills or Rigors/ Shakes       - Swelling and/or Redness in the area of the puncture site       - Worsening Pain       - Blood soaked bandages or worsening bleeding       - Lightheadedness and/or dizziness upon standing       - Chest Pain/ Tightness       - Shortness of Breath       - Difficulty walking    If you have a problem that you believe requires IMMEDIATE attention, please go to your NEAREST Emergency Room. If you believe your problem can safely wait until you speak to a physician, please call Interventional Radiology for any concerns.    During Normal Weekday Business Hours- You can contact the Interventional Radiology department during normal business hours via telephone.  During Evenings and Weekends- If you need to contact Interventional Radiology during off hours, do so by calling the hospital and requesting to be connected to the Interventional Radiologist on call.

## 2020-10-27 NOTE — ED PROVIDER NOTE - CARE PLAN
Goal:	**ATTENDING ADDENDUM (Dr. John Daniel): Goals of care include resolution of emergent/urgent symptoms and concerns, and restoration to baseline level of homeostasis.   Principal Discharge DX:	ESRD (end stage renal disease)  Goal:	**ATTENDING ADDENDUM (Dr. John Daniel): Goals of care include resolution of emergent/urgent symptoms and concerns, and restoration to baseline level of homeostasis.  Secondary Diagnosis:	Kidney transplant recipient  Secondary Diagnosis:	S/p nephrectomy  Secondary Diagnosis:	Type 2 diabetes mellitus

## 2020-10-27 NOTE — ED PROVIDER NOTE - PLAN OF CARE
**ATTENDING ADDENDUM (Dr. John Daniel): Goals of care include resolution of emergent/urgent symptoms and concerns, and restoration to baseline level of homeostasis.

## 2020-10-27 NOTE — ED PROVIDER NOTE - NS ED ROS FT
**ATTENDING ADDENDUM (Dr. John Daniel): During my interview with the patient, I have personally obtained and/or have directly verified the elements in the past medical/surgical history and other histories as noted earlier in the EMR, in conjunction with the other members (EM resident/PA/NP) of the patient care team. I have also personally obtained and/or have directly verified/reviewed the review of systems as documented below, in conjunction with the other members (EM resident/PA/NP) of the patient care team. **ATTENDING ADDENDUM (Dr. John Daniel): During my interview with the patient, I have personally obtained and/or have directly verified the elements in the past medical/surgical history and other histories as noted earlier in the EMR, in conjunction with the other members (EM resident/PA/NP) of the patient care team. I have also personally obtained and/or have directly verified/reviewed the review of systems as documented below, in conjunction with the other members (EM resident/PA/NP) of the patient care team.    Gen: Denies fever, weight loss  CV: Denies chest pain, palpitations  HEENT: Denies neck pain, sore throat, eye discharge  Skin: Denies rash, erythema, color changes  Resp: Denies SOB, cough  Endo: Denies sensitivity to heat, cold, increased urination  GI: Denies constipation, nausea, vomiting  Msk: Denies back pain, LE swelling, extremity pain  : Denies dysuria, increased frequency  Neuro: Denies LOC, weakness, seizures  Psych: Denies hx of psych, hallucinations, SI Gen: Denies fever, weight loss  CV: Denies chest pain, palpitations  HEENT: Denies neck pain, sore throat, eye discharge  Skin: Denies rash, erythema, color changes  Resp: Denies SOB, cough  Endo: Denies sensitivity to heat, cold, increased urination  GI: Denies constipation, nausea, vomiting  Msk: Denies back pain, LE swelling, extremity pain  : Denies dysuria, increased frequency  Neuro: Denies LOC, weakness, seizures  Psych: Denies hx of psych, hallucinations, suicidal ideation  **ATTENDING ADDENDUM (Dr. John Daniel): During my interview with the patient, I have personally obtained and/or have directly verified the elements in the past medical/surgical history and other histories as noted earlier in the EMR, in conjunction with the other members (EM resident/PA/NP) of the patient care team. I have also personally obtained and/or have directly verified/reviewed the review of systems as documented below, in conjunction with the other members (EM resident/PA/NP) of the patient care team.

## 2020-10-27 NOTE — ED PROVIDER NOTE - CHPI ED SYMPTOMS NEG
no dizziness/no vomiting/no chills/no nausea/no back pain/no pain/no decreased eating/drinking/no fever/no headache/no loss of consciousness

## 2020-10-27 NOTE — ED PROVIDER NOTE - MUSCULOSKELETAL, MLM
Spine appears normal, range of motion is not limited, no muscle or joint tenderness **ATTENDING ADDENDUM (Dr. John Daniel): Symmetrically equal strength, 5/5, in the bilateral upper and lower extremities. NO cords, soft-tissue swelling, or calf tenderness in the bilateral lower extremities. See comment box above.

## 2020-10-27 NOTE — DISCHARGE NOTE PROVIDER - HOSPITAL COURSE
Left message for patient to call clinic back to schedule a diabetes follow up with MD.  
70 y/o M with a PMH of ESRD 2/2 bilateral nephrectomy from neoplasm, hypertension, and NIDDM.  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  Re-admitted in 2/2019 with E coli bacteremia/urosepsis; treated w/ IV vanc/cefepime/zosyn.  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  Re-admitted in 5/2019 with severe CHELA 2/2 FK toxicity (level on admission was 39), +UA, was treated w/ cefepime.  Re-admitted in 8/2019 w/ uptrending Cr on outpatient labs (3.4), was  treated with pulse steroid for ACR 1B.  This admission, was noted to have elevated creatinine on outpatient labs.  Ultrasound showed hydronephrosis and hydroureter.  On 2/25 he underwent PCN placement by IR which was converted to PCNUS on 2/27.  Nephrostogram showed proximal ureteral stricture. PCNUS was capped and he was able to void without difficulty. Creatinine was 2.62. He was pain free, tolerating a regular diet and voiding.  [facility]-rectal surgery was consulted for his complaints of unchanged, persistent loose stools and an anal fissure.  He was to follow-up as an outpatient for further care.  His immunosuppression was optimized.  He was set up with visiting nurse to flush catheter 3 times weekly with 10ml NS.  As per IR, plan will be for q3 month PCNUS exchange.  He was discharged to home on Envarsus 5mg, MMF 1000BID and Prednisone 5mg, PPX Bactrim. He was set to follow-up with nephrology in clinic on March 5th.

## 2020-10-27 NOTE — ED PROVIDER NOTE - CHIEF COMPLAINT
The patient is a 70y Male complaining of abnormal lab result. The patient is a 70y Male complaining of abnormal lab result (elevated serum potassium of 6.2 mEq/L on outpatient testing)

## 2020-10-27 NOTE — ED PROVIDER NOTE - MUSCULOSKELETAL [+], MLM
**ATTENDING ADDENDUM (Dr. John Daniel): POSITIVE left upper extremity AV dialysis shunt (functioning, not used recently)

## 2020-10-27 NOTE — ED ADULT NURSE REASSESSMENT NOTE - NS ED NURSE REASSESS COMMENT FT1
Received report from Tamika @ 0230. Pt resting comfortably no acute distress. Unable to obtain Iv access after attempt. MD to place US guided iv

## 2020-10-27 NOTE — ED PROVIDER NOTE - GASTROINTESTINAL, MLM
Abdomen soft, non-tender **ATTENDING ADDENDUM (Dr. John Daniel): NO guarding, rebound, or rigidity. NO pulsatile or non-pulsatile masses. NO hernias. NO obvious hepatosplenomegaly.

## 2020-10-27 NOTE — ED PROVIDER NOTE - EYES, MLM
Clear bilaterally, pupils equal, round and reactive to light. **ATTENDING ADDENDUM (Dr. John Daniel): Extraocular muscle movements intact. Clear corneas bilaterally, pupils equal and round.

## 2020-10-27 NOTE — ED PROVIDER NOTE - NSFOLLOWUPINSTRUCTIONS_ED_ALL_ED_FT
Your potassium is 5.1.    Please follow up with the nephrologist that sent you in. Please see attached results.    Return to ED if you have chest pain, heart racing, nausea, vomiting or for any other concern.

## 2020-10-27 NOTE — ED PROVIDER NOTE - PHYSICAL EXAMINATION
**ATTENDING ADDENDUM (Dr. John Daniel): I have reviewed and substantially contributed to the elements of the PE as documented above. I have directly performed an examination of this patient in conjunction with the other members (EM resident/PA/NP) of the patient care team. I have personally reviewed the patient's vital signs at the time of the patient's initial presentation to the ED and repeatedly throughout the ED course. Gen: Alert and oriented. Lying comfortably in bed. Answering questions appropriately  HEENT: extra occular movements intact, no nasal discharge, mucous membranes moist  CV: Regular rate and rhythm, +S1/S2, no murmurs/rubs/gallops,   Resp: Clear to ausculation bilaterally, no wheezes/rhonchi/rales  GI: Abdomen soft non-distended, non tender to palpation, no masses  MSK: No open wounds, no bruising, no LE edema, Homans sign negative bl  Neuro: A&Ox4, following commands, moving all four extremities spontaneously  Psych: appropriate mood       **ATTENDING ADDENDUM (Dr. John Daniel): I have reviewed and substantially contributed to the elements of the PE as documented above. I have directly performed an examination of this patient in conjunction with the other members (EM resident/PA/NP) of the patient care team. I have personally reviewed the patient's vital signs at the time of the patient's initial presentation to the ED and repeatedly throughout the ED course. Gen: Alert and oriented. Lying comfortably in bed. Answering questions appropriately  HEENT: extra occular movements intact, no nasal discharge, mucous membranes moist  CV: Regular rate and rhythm, +S1/S2, no murmurs/rubs/gallops,   Resp: Clear to ausculation bilaterally, no wheezes/rhonchi/rales  GI: Abdomen soft non-distended, non tender to palpation, no masses  MSK: No open wounds, no bruising, no LE edema, Homans sign negative bl  Neuro: A&Ox4, following commands, moving all four extremities spontaneously  Psych: appropriate mood   **ATTENDING ADDENDUM (Dr. John Daniel): I have reviewed and substantially contributed to the elements of the PE as documented above. I have directly performed an examination of this patient in conjunction with the other members (EM resident/PA/NP) of the patient care team. I have personally reviewed the patient's vital signs at the time of the patient's initial presentation to the ED and repeatedly throughout the ED course. Of note, and in addition to the above, the patient has a left upper extremity dialysis shunt with thrill and bruit but without external signs of erythema, warmth, discoloration, thrombosis, or infection/cellulitis. POSITIVE nephrostomy tube acknowledged.

## 2020-10-27 NOTE — ASU PATIENT PROFILE, ADULT - PSH
AV fistula  2013/ left forearm  H/O ileostomy  '    for 3 months. s/p Reversal  Kidney transplant recipient  2018  @ [facility] :  +  Hep C Donor  S/p nephrectomy  right 12/10/2015   benign  S/p nephrectomy  left 9/15/2015   + Cancer  S/P right knee arthroscopy

## 2020-10-27 NOTE — ASU DISCHARGE PLAN (ADULT/PEDIATRIC) - NURSING INSTRUCTIONS
Please feel free to contact us at [phone] if any problems arise. After 6PM, Monday through Friday, on weekends and on holidays, please call [phone] and ask for the radiology resident on call to be paged.

## 2020-10-27 NOTE — PRE PROCEDURE NOTE - PRE PROCEDURE EVALUATION
Interventional Radiology Pre-Procedure Note    Procedure: Transplant nephrostogram with possible ureteroplasty, possbile nephroureteral stent placement possible nephropstomy tube and ureteral stent placement.    Diagnosis/Indication: Patient is a 70y old  Male who presents with right lower quadrant transplant kidney with stricture of the ureterovascular anastomosis presents for ureteroplasty. After nephrostogram may replace existing nephroureteral stent vs place ureteral stent and nephrostomy tube depending on intraprocedural findings.      PAST MEDICAL & SURGICAL HISTORY:  Medial meniscus tear  &#x27; 90&#x27;s  Right    Bowel obstruction  &#x27; 2017   surgically treated    Anal fissure  dx: &#x27; 2018   No surgery    Benign prostatic hypertrophy    Hepatitis C  In Donor Kidney: patient received treatment for Hep. C : post treatment: patient  tested Negative for Hep C    Kidney neoplasm  dx: &#x27; 2015 : Left      s/p bilateral Nephrectomy &#x27; 2015    ESRD (end stage renal disease)  On Dialysis &#x27;  to 2018    Type 2 diabetes mellitus  dx: &#x27;88    HTN (hypertension)    Kidney transplant recipient  2018  @ [facility] :  +  Hep C Donor    S/P right knee arthroscopy  &#x27; 90&#x27;s    H/O ileostomy  &#x27; 2017   for 3 months. s/p Reversal    S/p nephrectomy  right 12/10/2015   benign    S/p nephrectomy  left 9/15/2015   + Cancer    AV fistula  2013/ left forearm         Allergies: No Known Allergies      LABS:    10-27    138  |  99  |  31<H>  ----------------------------<  118<H>  5.1   |  27  |  3.35<H>    Ca    10.7<H>      27 Oct 2020 04:10          Procedure/ risks/ benefits were explained, informed consent obtained from patient, verbalizes understanding.

## 2020-10-27 NOTE — ED PROVIDER NOTE - PROGRESS NOTE DETAILS
**ATTENDING ADDENDUM (Dr. John Daniel): patient serially evaluated throughout ED course. NO acute deterioration up to this time in the ED. Awaiting completion of all ED diagnostics. Joseph Frankel PGY2: Potassium 5.1 today which is patient's baseline. Will encourage primary care follow up. Discussed plan and return precautions with patient who understands and agrees. All questions answered.

## 2020-10-27 NOTE — ED PROVIDER NOTE - CLINICAL SUMMARY MEDICAL DECISION MAKING FREE TEXT BOX
**ATTENDING MEDICAL DECISION MAKING/SYNTHESIS (Dr. John Daniel): I have reviewed the Chief Concern(s), the HPI, the ROS, and have directly performed and confirmed the findings on the Physical Examination. I have reviewed the medical decision making with all providers, as applicable. The PROBLEM REPRESENTATION at this time is: 70-year-old man with complex past medical/surgical history including bilateral nephrectomy re: renal cancer, s/p transplant, with ?rejection, now referred for evaluation of serum potassium re: recent lab work with hyperkalemia. The MOST LIKELY DIAGNOSIS, and the LIST OF DIFFERENTIAL DIAGNOSES, includes (but is not limited to) the following: factitious hyperkalemia, actual hyperkalemia secondary to Acute Kidney Injury on chronic kidney disease, other electrolyte-metabolic-endocrine derangements, dehydration, arrhythmia, nephrostomy tube derangement. The likelihood of each of these diagnoses has been appropriately considered in the context of this patient's presentation and my evaluation. PLAN: as described in EMR, including diagnostics, therapeutics and consultation as clinically warranted. I will continue to reevaluate the patient, including the results of all testing, and monitor response to therapy throughout the patient's course in the ED. The care of this patient was in support of the [place] countermeasures to COVID-19. **ATTENDING MEDICAL DECISION MAKING/SYNTHESIS (Dr. John Daniel): I have reviewed the Chief Concern(s), the HPI, the ROS, and have directly performed and confirmed the findings on the Physical Examination. I have reviewed the medical decision making with all providers, as applicable. The PROBLEM REPRESENTATION at this time is: 70-year-old man with complex past medical/surgical history including bilateral nephrectomy re: renal cancer, s/p transplant, with ?rejection, with chronic indwelling nephrostomy tube, and Diabetes Mellitus, now referred for evaluation of serum potassium re: recent lab work with hyperkalemia. The MOST LIKELY DIAGNOSIS, and the LIST OF DIFFERENTIAL DIAGNOSES, includes (but is not limited to) the following: factitious hyperkalemia, actual hyperkalemia secondary to Acute Kidney Injury superimposed on chronic kidney disease, other electrolyte-metabolic-endocrine derangements, dehydration, arrhythmia, nephrostomy tube derangement, worsening chronic kidney disease, serious bacterial infection or sepsis/severe sepsis, acute rejection, chronic rejection. The likelihood of each of these diagnoses has been appropriately considered in the context of this patient's presentation and my evaluation. PLAN: as described in EMR, including diagnostics, therapeutics and consultation as clinically warranted. I will continue to reevaluate the patient, including the results of all testing, and monitor response to therapy throughout the patient's course in the ED. The care of this patient was in support of the [place] countermeasures to COVID-19.

## 2020-10-27 NOTE — ED PROVIDER NOTE - LAB INTERPRETATION
**ATTENDING ADDENDUM (Dr. John Daniel): Labs reviewed and analyzed. Pertinent findings include: serum potassium within baseline limits. Serum creatinine elevated, but patient is already aware of failing renal transplant and is already receiving followup for new transplantation.

## 2020-10-27 NOTE — ED PROVIDER NOTE - PATIENT PORTAL LINK FT
You can access the FollowMyHealth Patient Portal offered by [facility] by registering at the following website: [email or web address]. By joining [employer]’s FollowMyHealth portal, you will also be able to view your health information using other applications (apps) compatible with our system.

## 2020-11-02 LAB
ABO + RH PNL BLD: NORMAL
ALBUMIN SERPL ELPH-MCNC: 4.4 G/DL
ALP BLD-CCNC: 73 U/L
ALT SERPL-CCNC: 15 U/L
ANION GAP SERPL CALC-SCNC: 13 MMOL/L
APPEARANCE: ABNORMAL
AST SERPL-CCNC: 20 U/L
BACTERIA: ABNORMAL
BASOPHILS # BLD AUTO: 0.03 K/UL
BASOPHILS NFR BLD AUTO: 0.4 %
BILIRUB SERPL-MCNC: 0.2 MG/DL
BILIRUBIN URINE: NEGATIVE
BLOOD URINE: NORMAL
BUN SERPL-MCNC: 30 MG/DL
CALCIUM SERPL-MCNC: 10.5 MG/DL
CHLORIDE SERPL-SCNC: 98 MMOL/L
CHOLEST SERPL-MCNC: 144 MG/DL
CMV IGG SERPL QL: 2.5 U/ML
CMV IGG SERPL-IMP: POSITIVE
CO2 SERPL-SCNC: 26 MMOL/L
COLOR: NORMAL
CREAT SERPL-MCNC: 3.19 MG/DL
CREAT SPEC-SCNC: 132 MG/DL
CREAT/PROT UR: 0.3 RATIO
EBV DNA SERPL NAA+PROBE-ACNC: NOT DETECTED IU/ML
EBV EA AB SER IA-ACNC: 29.6 U/ML
EBV EA AB TITR SER IF: POSITIVE
EBV EA IGG SER QL IA: 41.1 U/ML
EBV EA IGG SER-ACNC: POSITIVE
EBV EA IGM SER IA-ACNC: NEGATIVE
EBV PATRN SPEC IB-IMP: NORMAL
EBV VCA IGG SER IA-ACNC: >750 U/ML
EBV VCA IGM SER QL IA: 28.8 U/ML
EOSINOPHIL # BLD AUTO: 0.06 K/UL
EOSINOPHIL NFR BLD AUTO: 0.7 %
EPSTEIN-BARR VIRUS CAPSID ANTIGEN IGG: POSITIVE
ESTIMATED AVERAGE GLUCOSE: 148 MG/DL
GLUCOSE QUALITATIVE U: NEGATIVE
GLUCOSE SERPL-MCNC: 164 MG/DL
HAV IGM SER QL: NONREACTIVE
HBA1C MFR BLD HPLC: 6.8 %
HBV CORE IGG+IGM SER QL: NONREACTIVE
HBV SURFACE AB SER QL: NONREACTIVE
HBV SURFACE AB SERPL IA-ACNC: 3.1 MIU/ML
HBV SURFACE AG SER QL: NONREACTIVE
HCT VFR BLD CALC: 37.9 %
HCV AB SER QL: NONREACTIVE
HCV S/CO RATIO: 0.13 S/CO
HDLC SERPL-MCNC: 61 MG/DL
HEPATITIS A IGG ANTIBODY: NONREACTIVE
HGB BLD-MCNC: 11.8 G/DL
HIV1+2 AB SPEC QL IA.RAPID: NONREACTIVE
HSV 1+2 IGG SER IA-IMP: NEGATIVE
HSV 1+2 IGG SER IA-IMP: POSITIVE
HSV1 IGG SER QL: 0.45 INDEX
HSV2 IGG SER QL: >23.6 INDEX
HYALINE CASTS: 0 /LPF
IMM GRANULOCYTES NFR BLD AUTO: 1.4 %
INR PPP: 1.02 RATIO
KETONES URINE: NEGATIVE
LDLC SERPL CALC-MCNC: 52 MG/DL
LEUKOCYTE ESTERASE URINE: ABNORMAL
LYMPHOCYTES # BLD AUTO: 2.02 K/UL
LYMPHOCYTES NFR BLD AUTO: 23.9 %
M TB IFN-G BLD-IMP: NEGATIVE
MAGNESIUM SERPL-MCNC: 1.8 MG/DL
MAN DIFF?: NORMAL
MCHC RBC-ENTMCNC: 30.7 PG
MCHC RBC-ENTMCNC: 31.1 GM/DL
MCV RBC AUTO: 98.7 FL
MICROSCOPIC-UA: NORMAL
MONOCYTES # BLD AUTO: 0.64 K/UL
MONOCYTES NFR BLD AUTO: 7.6 %
NEUTROPHILS # BLD AUTO: 5.59 K/UL
NEUTROPHILS NFR BLD AUTO: 66 %
NITRITE URINE: POSITIVE
NONHDLC SERPL-MCNC: 83 MG/DL
PH URINE: 6.5
PHOSPHATE SERPL-MCNC: 3.7 MG/DL
PLATELET # BLD AUTO: 182 K/UL
POTASSIUM SERPL-SCNC: 6.2 MMOL/L
PROT SERPL-MCNC: 7.3 G/DL
PROT UR-MCNC: 41 MG/DL
PROTEIN URINE: ABNORMAL
PSA SERPL-MCNC: 4.29 NG/ML
PT BLD: 12.1 SEC
QUANTIFERON TB PLUS MITOGEN MINUS NIL: 2.15 IU/ML
QUANTIFERON TB PLUS NIL: 0.01 IU/ML
QUANTIFERON TB PLUS TB1 MINUS NIL: 0 IU/ML
QUANTIFERON TB PLUS TB2 MINUS NIL: 0 IU/ML
RBC # BLD: 3.84 M/UL
RBC # FLD: 14.3 %
RED BLOOD CELLS URINE: 3 /HPF
RUBV IGG FLD-ACNC: >33 INDEX
RUBV IGG SER-IMP: POSITIVE
SARS-COV-2 IGG SERPL IA-ACNC: 0.12 INDEX
SARS-COV-2 IGG SERPL QL IA: NEGATIVE
SODIUM SERPL-SCNC: 137 MMOL/L
SPECIFIC GRAVITY URINE: 1.01
SQUAMOUS EPITHELIAL CELLS: 0 /HPF
T GONDII AB SER-IMP: POSITIVE
T GONDII IGG SER QL: 346 IU/ML
TRIGL SERPL-MCNC: 156 MG/DL
UROBILINOGEN URINE: NORMAL
VZV AB TITR SER: POSITIVE
VZV IGG SER IF-ACNC: 2951 INDEX
WBC # FLD AUTO: 8.46 K/UL
WHITE BLOOD CELLS URINE: 272 /HPF

## 2020-11-09 LAB — T PALLIDUM AB SER QL IA: POSITIVE

## 2020-12-03 ENCOUNTER — APPOINTMENT (OUTPATIENT)
Dept: CV DIAGNOSITCS | Facility: HOSPITAL | Age: 70
End: 2020-12-03
Payer: COMMERCIAL

## 2020-12-03 ENCOUNTER — OUTPATIENT (OUTPATIENT)
Dept: OUTPATIENT SERVICES | Facility: HOSPITAL | Age: 70
LOS: 1 days | End: 2020-12-03

## 2020-12-03 ENCOUNTER — APPOINTMENT (OUTPATIENT)
Dept: CV DIAGNOSTICS | Facility: HOSPITAL | Age: 70
End: 2020-12-03
Payer: COMMERCIAL

## 2020-12-03 DIAGNOSIS — Z98.890 OTHER SPECIFIED POSTPROCEDURAL STATES: Chronic | ICD-10-CM

## 2020-12-03 DIAGNOSIS — Z01.818 ENCOUNTER FOR OTHER PREPROCEDURAL EXAMINATION: ICD-10-CM

## 2020-12-03 DIAGNOSIS — Z94.0 KIDNEY TRANSPLANT STATUS: Chronic | ICD-10-CM

## 2020-12-03 DIAGNOSIS — Z90.5 ACQUIRED ABSENCE OF KIDNEY: Chronic | ICD-10-CM

## 2020-12-03 DIAGNOSIS — I77.0 ARTERIOVENOUS FISTULA, ACQUIRED: Chronic | ICD-10-CM

## 2020-12-03 PROCEDURE — 93306 TTE W/DOPPLER COMPLETE: CPT | Mod: 26

## 2020-12-03 PROCEDURE — 78452 HT MUSCLE IMAGE SPECT MULT: CPT | Mod: 26

## 2020-12-03 PROCEDURE — 93016 CV STRESS TEST SUPVJ ONLY: CPT | Mod: GC

## 2020-12-03 PROCEDURE — 93018 CV STRESS TEST I&R ONLY: CPT | Mod: GC

## 2020-12-07 ENCOUNTER — APPOINTMENT (OUTPATIENT)
Dept: UROLOGY | Facility: CLINIC | Age: 70
End: 2020-12-07
Payer: MEDICARE

## 2020-12-07 VITALS — TEMPERATURE: 97.7 F

## 2020-12-07 DIAGNOSIS — N52.9 MALE ERECTILE DYSFUNCTION, UNSPECIFIED: ICD-10-CM

## 2020-12-07 PROCEDURE — 99214 OFFICE O/P EST MOD 30 MIN: CPT

## 2020-12-15 LAB
PSA FREE FLD-MCNC: 8 %
PSA FREE SERPL-MCNC: 0.41 NG/ML
PSA SERPL-MCNC: 5.27 NG/ML

## 2020-12-23 ENCOUNTER — RX RENEWAL (OUTPATIENT)
Age: 70
End: 2020-12-23

## 2020-12-23 PROBLEM — Z87.440 HISTORY OF URINARY TRACT INFECTION: Status: RESOLVED | Noted: 2018-08-31 | Resolved: 2020-12-23

## 2020-12-30 ENCOUNTER — NON-APPOINTMENT (OUTPATIENT)
Age: 70
End: 2020-12-30

## 2020-12-30 ENCOUNTER — APPOINTMENT (OUTPATIENT)
Dept: CARDIOLOGY | Facility: CLINIC | Age: 70
End: 2020-12-30
Payer: COMMERCIAL

## 2020-12-30 VITALS
HEIGHT: 71 IN | DIASTOLIC BLOOD PRESSURE: 66 MMHG | OXYGEN SATURATION: 99 % | WEIGHT: 208 LBS | SYSTOLIC BLOOD PRESSURE: 128 MMHG | BODY MASS INDEX: 29.12 KG/M2 | HEART RATE: 76 BPM | TEMPERATURE: 96.6 F

## 2020-12-30 DIAGNOSIS — Z01.818 ENCOUNTER FOR OTHER PREPROCEDURAL EXAMINATION: ICD-10-CM

## 2020-12-30 DIAGNOSIS — N17.9 ACUTE KIDNEY FAILURE, UNSPECIFIED: ICD-10-CM

## 2020-12-30 PROCEDURE — 99203 OFFICE O/P NEW LOW 30 MIN: CPT

## 2020-12-30 PROCEDURE — 99072 ADDL SUPL MATRL&STAF TM PHE: CPT

## 2020-12-30 PROCEDURE — 93000 ELECTROCARDIOGRAM COMPLETE: CPT | Mod: NC

## 2020-12-30 NOTE — PHYSICAL EXAM
[General Appearance - Well Developed] : well developed [Normal Appearance] : normal appearance [Well Groomed] : well groomed [General Appearance - Well Nourished] : well nourished [No Deformities] : no deformities [General Appearance - In No Acute Distress] : no acute distress [Normal Conjunctiva] : the conjunctiva exhibited no abnormalities [Eyelids - No Xanthelasma] : the eyelids demonstrated no xanthelasmas [Normal Oral Mucosa] : normal oral mucosa [No Oral Pallor] : no oral pallor [No Oral Cyanosis] : no oral cyanosis [Heart Rate And Rhythm] : heart rate and rhythm were normal [Heart Sounds] : normal S1 and S2 [Murmurs] : no murmurs present [Edema] : no peripheral edema present [Respiration, Rhythm And Depth] : normal respiratory rhythm and effort [Exaggerated Use Of Accessory Muscles For Inspiration] : no accessory muscle use [Auscultation Breath Sounds / Voice Sounds] : lungs were clear to auscultation bilaterally [Abdomen Soft] : soft [Abdomen Tenderness] : non-tender [Abnormal Walk] : normal gait [Cyanosis, Localized] : no localized cyanosis [Skin Color & Pigmentation] : normal skin color and pigmentation [] : no rash [Oriented To Time, Place, And Person] : oriented to person, place, and time [No Anxiety] : not feeling anxious [FreeTextEntry1] : left arm fistula

## 2020-12-30 NOTE — DISCUSSION/SUMMARY
[Hypertension] : hypertension [Stable] : stable [FreeTextEntry1] : \par Currently stable from a cardiovascular standpoint. Normotensive. Euvolemic. Stable very mild nonobstructive CAD. Asymptomatic. Patient with prior renal transplant on immunosuppressive therapy. Currently with CKD stage IV. Continue current medications. Advised patient to start aspirin 81 mg daily. ECG completed today and reviewed. Prior cardiac testing reviewed. At this time, patient is considered an acceptable risk from a cardiac standpoint for renal transplant. Follow up annually pre-transplant.

## 2020-12-30 NOTE — HISTORY OF PRESENT ILLNESS
[FreeTextEntry1] : Patient with DM, HTN, CKD (s/p renal transplant 2018), hepatitis C, CMV, kidney cancer (s/p left nephrectomy), and BPH. Conditions have been stable. Currently doing well and denies chest pains, shortness of breath or palpitations.

## 2021-01-19 ENCOUNTER — APPOINTMENT (OUTPATIENT)
Dept: TRANSPLANT | Facility: CLINIC | Age: 71
End: 2021-01-19

## 2021-01-20 ENCOUNTER — APPOINTMENT (OUTPATIENT)
Dept: NEPHROLOGY | Facility: CLINIC | Age: 71
End: 2021-01-20
Payer: MEDICARE

## 2021-01-20 VITALS
DIASTOLIC BLOOD PRESSURE: 70 MMHG | TEMPERATURE: 96.9 F | RESPIRATION RATE: 17 BRPM | BODY MASS INDEX: 30.8 KG/M2 | SYSTOLIC BLOOD PRESSURE: 156 MMHG | OXYGEN SATURATION: 96 % | HEART RATE: 83 BPM | HEIGHT: 71 IN | WEIGHT: 220 LBS

## 2021-01-20 PROCEDURE — 99214 OFFICE O/P EST MOD 30 MIN: CPT

## 2021-01-20 NOTE — REASON FOR VISIT
[Initial] : an initial visit for [Kidney Transplant Evaluation] : kidney transplant evaluation [FreeTextEntry2] : Dr. Bonilla

## 2021-01-20 NOTE — PHYSICAL EXAM
[General Appearance - Alert] : alert [General Appearance - In No Acute Distress] : in no acute distress [General Appearance - Well Nourished] : well nourished [General Appearance - Well Developed] : well developed [Sclera] : the sclera and conjunctiva were normal [Extraocular Movements] : extraocular movements were intact [Neck Appearance] : the appearance of the neck was normal [Outer Ear] : the ears and nose were normal in appearance [] : no respiratory distress [Respiration, Rhythm And Depth] : normal respiratory rhythm and effort [Heart Rate And Rhythm] : heart rate was normal and rhythm regular [Heart Sounds] : normal S1 and S2 [Heart Sounds Gallop] : no gallops [Abdominal Aorta] : the abdominal aorta was normal [Edema] : there was no peripheral edema [Full Pulse] : the pedal pulses are present [Bowel Sounds] : normal bowel sounds [Abdomen Tenderness] : non-tender [Cervical Lymph Nodes Enlarged Posterior Bilaterally] : posterior cervical [Cervical Lymph Nodes Enlarged Anterior Bilaterally] : anterior cervical [Supraclavicular Lymph Nodes Enlarged Bilaterally] : supraclavicular [Abnormal Walk] : normal gait [Skin Color & Pigmentation] : normal skin color and pigmentation [Skin Turgor] : normal skin turgor [Cranial Nerves] : cranial nerves 2-12 were intact [No Focal Deficits] : no focal deficits [Oriented To Time, Place, And Person] : oriented to person, place, and time [Impaired Insight] : insight and judgment were intact [Affect] : the affect was normal [FreeTextEntry1] : nephrostomy tube in place.

## 2021-01-20 NOTE — ASSESSMENT
[FreeTextEntry1] : 1. CKD of kidney transplantation - Pts aaron creatinine 1.9 but had CHELA with pyelonephritis. Has had multiple episodes of CHELA. 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  Creatinine came down to 2.2 but now back up to 3.  Pt in process of relisting.. Cell free DNA 0.2% in October end. Pt also may have a potential living donor.  \par 2. Immunosuppression - simulect induction, tacrolimus target 5-7, Envarsus 4 mgs, MMF 500mgs BID.  Does not tolerate higher dose due to diarrhea and leukopenia. \par 3. DM2 - on januvia and glimepiride. BG controlled at home.  A1c at goal. Discussed that sugar may drop once he starts cipro and to reduce glimepiride by half. \par 4. HTN - controlled at home. \par 5. HCV - genotype 3a. completed Epclusa. Last vl note detected. \par 6. Anemia - due to CKD/CHELA.  last Hb at goal. \par 7. Chronic diarrhea - on and off on lomotil prn. Has seen GI and had a colonoscopy in 2019. Has history of small bowel resection which may be the cause of diarrhea. 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 \par 9.  UTI - start cipro 500mgs BID x 7 days.  f/u sensitivities. \par \par \par Need plan for NU tube.  Discussed with Dr. Yao, plan to internalize with IR.\par f/u in 3 months. \par

## 2021-01-20 NOTE — HISTORY OF PRESENT ILLNESS
[de-identified] : Tube exchanged October 29th.  Feels ok.  Labs from yesterday reviewed, culture with pseudomonas in urine.  Pt denies dysuria.  Blood pressure and sugars ok at home.  [TextBox_42] : Mr. Medrano is a 70 y.o male with a hsitory of ESRD since 2015 after bilateral nephrectomies for RCC s/p DDRT 2018 who presents for transplant relisting.  Of note his current eGFR is about 22.  He follows with Dr. Bonilla.\par He has known DM  since age 41, HTN.\par Has b/l nephrectomies for RCC,  left on 2015 and right 2015.\par H/o intestinal blockage and was hospitalized at [facility] in 2017. Had surgical correction. Has left UE AVF.\par \par s/p  donor kidney transplant on 18.\par HCV donor. 40 yrs old male. cold ischemia time 23 hrs. Had delayed graft function but kidney eventually worked with best creatinine 1.9. \par \par PT had several admissions post transplant.  Early on he was readmitted for anemia and hematoma. Required 2 kidney biopsies which revealed few oxalate crystals and borderline rejection for which he was treated with solumedrol 375 and prednisone taper.  He is on bicitra to reduce risk of oxalate stones.  Also admitted for CHELA few times, UTI's and CMV. \par Pt admitted for CHELA and hydronephrosis 2020. Had nephrostomy tube then NU tube placed. then admitted for UTI. Went for ureteral stricture dilation and replacement of NU tube on 8/3/20.\par \par Pt was treated for UTI in 2020.  Pt had NU tube check in September, was not changed. Going to have it rechecked/ removed tomorrow.  Creatinine now about 3.  \par \par Social: Non smoker, no alcohol or drug use.  Currently working, . \par \par Able to walk 5-6 blocks, can climb stairs.\par ROS: Has no shortness of breath on exertion. \par Fam: Parents are . Father had DM, Mother had breast cancer.\par Has no children; Wife, healthy, 61 years. 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

## 2021-01-21 ENCOUNTER — NON-APPOINTMENT (OUTPATIENT)
Age: 71
End: 2021-01-21

## 2021-01-21 LAB
ALBUMIN SERPL ELPH-MCNC: 4.1 G/DL
ALP BLD-CCNC: 70 U/L
ALT SERPL-CCNC: 11 U/L
ANION GAP SERPL CALC-SCNC: 12 MMOL/L
APPEARANCE: ABNORMAL
AST SERPL-CCNC: 18 U/L
BACTERIA UR CULT: ABNORMAL
BACTERIA: ABNORMAL
BASOPHILS # BLD AUTO: 0.04 K/UL
BASOPHILS NFR BLD AUTO: 0.5 %
BILIRUB SERPL-MCNC: 0.2 MG/DL
BILIRUBIN URINE: NEGATIVE
BKV DNA SPEC QL NAA+PROBE: NOT DETECTED COPIES/ML
BLOOD URINE: ABNORMAL
BUN SERPL-MCNC: 23 MG/DL
CALCIUM SERPL-MCNC: 9.7 MG/DL
CALCIUM SERPL-MCNC: 9.7 MG/DL
CHLORIDE SERPL-SCNC: 101 MMOL/L
CO2 SERPL-SCNC: 25 MMOL/L
COLOR: NORMAL
CREAT SERPL-MCNC: 2.73 MG/DL
CREAT SPEC-SCNC: 130 MG/DL
CREAT/PROT UR: 0.2 RATIO
EOSINOPHIL # BLD AUTO: 0.09 K/UL
EOSINOPHIL NFR BLD AUTO: 1.2 %
GLUCOSE QUALITATIVE U: NEGATIVE
GLUCOSE SERPL-MCNC: 221 MG/DL
HCT VFR BLD CALC: 35.8 %
HGB BLD-MCNC: 10.7 G/DL
HYALINE CASTS: 0 /LPF
IMM GRANULOCYTES NFR BLD AUTO: 0.8 %
KETONES URINE: NEGATIVE
LDH SERPL-CCNC: 147 U/L
LEUKOCYTE ESTERASE URINE: ABNORMAL
LYMPHOCYTES # BLD AUTO: 2.05 K/UL
LYMPHOCYTES NFR BLD AUTO: 27.8 %
MAGNESIUM SERPL-MCNC: 1.5 MG/DL
MAN DIFF?: NORMAL
MCHC RBC-ENTMCNC: 29.8 PG
MCHC RBC-ENTMCNC: 29.9 GM/DL
MCV RBC AUTO: 99.7 FL
MICROSCOPIC-UA: NORMAL
MONOCYTES # BLD AUTO: 0.62 K/UL
MONOCYTES NFR BLD AUTO: 8.4 %
NEUTROPHILS # BLD AUTO: 4.51 K/UL
NEUTROPHILS NFR BLD AUTO: 61.3 %
NITRITE URINE: POSITIVE
PARATHYROID HORMONE INTACT: 69 PG/ML
PH URINE: 6
PHOSPHATE SERPL-MCNC: 2.7 MG/DL
PLATELET # BLD AUTO: 187 K/UL
POTASSIUM SERPL-SCNC: 4.7 MMOL/L
PROT SERPL-MCNC: 6.4 G/DL
PROT UR-MCNC: 31 MG/DL
PROTEIN URINE: ABNORMAL
RBC # BLD: 3.59 M/UL
RBC # FLD: 14.6 %
RED BLOOD CELLS URINE: 9 /HPF
SODIUM SERPL-SCNC: 138 MMOL/L
SPECIFIC GRAVITY URINE: 1.01
SQUAMOUS EPITHELIAL CELLS: 1 /HPF
TACROLIMUS SERPL-MCNC: 4.9 NG/ML
URATE SERPL-MCNC: 6.7 MG/DL
UROBILINOGEN URINE: NORMAL
WBC # FLD AUTO: 7.37 K/UL
WHITE BLOOD CELLS URINE: 126 /HPF

## 2021-01-22 LAB — CMV DNA SPEC QL NAA+PROBE: NOT DETECTED IU/ML

## 2021-02-09 ENCOUNTER — APPOINTMENT (OUTPATIENT)
Dept: NEPHROLOGY | Facility: CLINIC | Age: 71
End: 2021-02-09

## 2021-02-09 ENCOUNTER — APPOINTMENT (OUTPATIENT)
Dept: NEPHROLOGY | Facility: CLINIC | Age: 71
End: 2021-02-09
Payer: MEDICARE

## 2021-02-09 PROCEDURE — 99442: CPT | Mod: 95

## 2021-02-09 RX ORDER — CIPROFLOXACIN HYDROCHLORIDE 500 MG/1
500 TABLET, FILM COATED ORAL
Qty: 14 | Refills: 0 | Status: DISCONTINUED | COMMUNITY
Start: 2021-01-20 | End: 2021-02-09

## 2021-02-09 NOTE — PHYSICAL EXAM
[Neck Appearance] : the appearance of the neck was normal [Abdomen Tenderness] : non-tender [Cervical Lymph Nodes Enlarged Posterior Bilaterally] : posterior cervical [Cervical Lymph Nodes Enlarged Anterior Bilaterally] : anterior cervical [Supraclavicular Lymph Nodes Enlarged Bilaterally] : supraclavicular [No Focal Deficits] : no focal deficits

## 2021-02-09 NOTE — ASSESSMENT
[FreeTextEntry1] : 1. CKD of kidney transplantation - Pts aaron creatinine 1.9 but had CHELA with pyelonephritis. Has had multiple episodes of CHELA.  Pt in process of relisting.. Cell free DNA 0.2% in October end. Pt also may have a potential living donor.  Now again with CHELA s/p hospitalization at [place].  Will repeat labs.  Need plan for NU tube, will have pt see Dr. Yao.  \par 2. Immunosuppression - simulect induction, tacrolimus target 5-7, Envarsus 4 mgs, MMF 500mgs BID.  Does not tolerate higher dose due to diarrhea and leukopenia. \par 3. DM2 - on januvia and glimepiride. BG controlled at home.  A1c at goal. \par 4. HTN - controlled at home. \par 5. HCV - genotype 3a. completed Epclusa. Last vl note detected. \par 6. Anemia - due to CKD/CHELA.  last Hb at goal. \par 7. Chronic diarrhea - on and off on lomotil prn. Has seen GI and had a colonoscopy in 2019. Has history of small bowel resection which may be the cause of diarrhea. 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 \par 9.  UTI - recurrent likely related to NU tube\par 10.  COVID19 - asymptomatic.  s/p monoclonal antibody.  Resolved.  \par \par Obtain labs in 2 days and f/u with Dr. Yao in 1 month and myself in 2 months. \par

## 2021-02-09 NOTE — HISTORY OF PRESENT ILLNESS
[Home] : at home, [unfilled] , at the time of the visit. [Medical Office: ([facility])___] : at the medical office located in  [Verbal consent obtained from patient] : the patient, [unfilled] [de-identified] : Tube exchanged October 29th.  Pt diagnosed with Covid, wife was diagnosed with Covid on Jan 28th.  On 29th pt was tested in an urgent care, tested positive and went to [facility].  Had no symptoms.  Received the monoclonal antibody on the 29th.  Currently he still feels well.  Creatinine in 3's in [facility] and he was admitted and received 5 days of IV antibiotics for urine infection.  Did not go home on oral antibiotics.  Pt had bacteremia.  Discharged with creatinine of 2.8.  Feels like he is urinating a little slower than usual.  [TextBox_42] : Mr. Medrano is a 70 y.o male with a hsitory of ESRD since 2015 after bilateral nephrectomies for RCC s/p DDRT 2018 who presents for transplant relisting.  Of note his current eGFR is about 22.  He follows with Dr. Bonilla.\par He has known DM  since age 41, HTN.\par Has b/l nephrectomies for RCC,  left on 2015 and right 2015.\par H/o intestinal blockage and was hospitalized at [facility] in 2017. Had surgical correction. Has left UE AVF.\par \par s/p  donor kidney transplant on 18.\par HCV donor. 40 yrs old male. cold ischemia time 23 hrs. Had delayed graft function but kidney eventually worked with best creatinine 1.9. \par \par PT had several admissions post transplant.  Early on he was readmitted for anemia and hematoma. Required 2 kidney biopsies which revealed few oxalate crystals and borderline rejection for which he was treated with solumedrol 375 and prednisone taper.  He is on bicitra to reduce risk of oxalate stones.  Also admitted for CHELA few times, UTI's and CMV. \par Pt admitted for CHELA and hydronephrosis 2020. Had nephrostomy tube then NU tube placed. then admitted for UTI. Went for ureteral stricture dilation and replacement of NU tube on 8/3/20.\par \par Pt was treated for UTI in 2020.  Pt had NU tube check in September, was not changed. Going to have it rechecked/ removed tomorrow.  Creatinine now about 3.  \par \par Social: Non smoker, no alcohol or drug use.  Currently working, . \par \par Able to walk 5-6 blocks, can climb stairs.\par ROS: Has no shortness of breath on exertion. \par Fam: Parents are . Father had DM, Mother had breast cancer.\par Has no children; Wife, healthy, 61 years. 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

## 2021-02-10 ENCOUNTER — OUTPATIENT (OUTPATIENT)
Dept: OUTPATIENT SERVICES | Facility: HOSPITAL | Age: 71
LOS: 1 days | End: 2021-02-10
Payer: MEDICARE

## 2021-02-10 ENCOUNTER — RESULT REVIEW (OUTPATIENT)
Age: 71
End: 2021-02-10

## 2021-02-10 ENCOUNTER — APPOINTMENT (OUTPATIENT)
Dept: MRI IMAGING | Facility: CLINIC | Age: 71
End: 2021-02-10
Payer: MEDICARE

## 2021-02-10 DIAGNOSIS — Z90.5 ACQUIRED ABSENCE OF KIDNEY: Chronic | ICD-10-CM

## 2021-02-10 DIAGNOSIS — Z98.890 OTHER SPECIFIED POSTPROCEDURAL STATES: Chronic | ICD-10-CM

## 2021-02-10 DIAGNOSIS — Z94.0 KIDNEY TRANSPLANT STATUS: Chronic | ICD-10-CM

## 2021-02-10 DIAGNOSIS — I77.0 ARTERIOVENOUS FISTULA, ACQUIRED: Chronic | ICD-10-CM

## 2021-02-10 DIAGNOSIS — R97.20 ELEVATED PROSTATE SPECIFIC ANTIGEN [PSA]: ICD-10-CM

## 2021-02-10 PROCEDURE — 72195 MRI PELVIS W/O DYE: CPT | Mod: 26,MH

## 2021-02-10 PROCEDURE — G1004: CPT

## 2021-02-10 PROCEDURE — 72195 MRI PELVIS W/O DYE: CPT

## 2021-02-11 ENCOUNTER — APPOINTMENT (OUTPATIENT)
Dept: TRANSPLANT | Facility: CLINIC | Age: 71
End: 2021-02-11

## 2021-02-12 LAB — SARS-COV-2 N GENE NPH QL NAA+PROBE: DETECTED

## 2021-02-17 LAB
25(OH)D3 SERPL-MCNC: 41.5 NG/ML
ALBUMIN SERPL ELPH-MCNC: 3.9 G/DL
ALP BLD-CCNC: 61 U/L
ALT SERPL-CCNC: 38 U/L
ANION GAP SERPL CALC-SCNC: 17 MMOL/L
APPEARANCE: ABNORMAL
AST SERPL-CCNC: 52 U/L
BACTERIA: ABNORMAL
BASOPHILS # BLD AUTO: 0.02 K/UL
BASOPHILS NFR BLD AUTO: 0.3 %
BILIRUB SERPL-MCNC: 0.2 MG/DL
BILIRUBIN URINE: NEGATIVE
BLOOD URINE: ABNORMAL
BUN SERPL-MCNC: 48 MG/DL
CALCIUM SERPL-MCNC: 9.8 MG/DL
CALCIUM SERPL-MCNC: 9.8 MG/DL
CHLORIDE SERPL-SCNC: 109 MMOL/L
CHOLEST SERPL-MCNC: 112 MG/DL
CO2 SERPL-SCNC: 14 MMOL/L
COLOR: YELLOW
CREAT SERPL-MCNC: 3.17 MG/DL
CREAT SPEC-SCNC: 162 MG/DL
CREAT/PROT UR: 0.7 RATIO
EOSINOPHIL # BLD AUTO: 0.08 K/UL
EOSINOPHIL NFR BLD AUTO: 1.1 %
GLUCOSE QUALITATIVE U: NEGATIVE
GLUCOSE SERPL-MCNC: 116 MG/DL
HCT VFR BLD CALC: 35.1 %
HDLC SERPL-MCNC: 44 MG/DL
HGB BLD-MCNC: 10.5 G/DL
HYALINE CASTS: 3 /LPF
IMM GRANULOCYTES NFR BLD AUTO: 1.5 %
KETONES URINE: NEGATIVE
LDH SERPL-CCNC: 331 U/L
LDLC SERPL CALC-MCNC: 31 MG/DL
LEUKOCYTE ESTERASE URINE: ABNORMAL
LYMPHOCYTES # BLD AUTO: 1.83 K/UL
LYMPHOCYTES NFR BLD AUTO: 24.2 %
MAGNESIUM SERPL-MCNC: 1.8 MG/DL
MAN DIFF?: NORMAL
MCHC RBC-ENTMCNC: 29.3 PG
MCHC RBC-ENTMCNC: 29.9 GM/DL
MCV RBC AUTO: 98 FL
MICROSCOPIC-UA: NORMAL
MONOCYTES # BLD AUTO: 1.03 K/UL
MONOCYTES NFR BLD AUTO: 13.6 %
NEUTROPHILS # BLD AUTO: 4.49 K/UL
NEUTROPHILS NFR BLD AUTO: 59.3 %
NITRITE URINE: POSITIVE
NONHDLC SERPL-MCNC: 69 MG/DL
PARATHYROID HORMONE INTACT: 48 PG/ML
PH URINE: 6
PHOSPHATE SERPL-MCNC: 3.1 MG/DL
PLATELET # BLD AUTO: 210 K/UL
POTASSIUM SERPL-SCNC: 5.3 MMOL/L
PROT SERPL-MCNC: 6.8 G/DL
PROT UR-MCNC: 110 MG/DL
PROTEIN URINE: ABNORMAL
RBC # BLD: 3.58 M/UL
RBC # FLD: 14.8 %
RED BLOOD CELLS URINE: 50 /HPF
SODIUM SERPL-SCNC: 139 MMOL/L
SPECIFIC GRAVITY URINE: 1.02
SQUAMOUS EPITHELIAL CELLS: 5 /HPF
TACROLIMUS SERPL-MCNC: 4.6 NG/ML
TRIGL SERPL-MCNC: 187 MG/DL
URATE SERPL-MCNC: 8 MG/DL
UROBILINOGEN URINE: NORMAL
WBC # FLD AUTO: 7.56 K/UL
WHITE BLOOD CELLS URINE: 53 /HPF

## 2021-02-22 LAB
BACTERIA UR CULT: ABNORMAL
BKV DNA SPEC QL NAA+PROBE: NOT DETECTED COPIES/ML
CMV DNA SPEC QL NAA+PROBE: NOT DETECTED IU/ML

## 2021-02-25 ENCOUNTER — APPOINTMENT (OUTPATIENT)
Dept: TRANSPLANT | Facility: CLINIC | Age: 71
End: 2021-02-25

## 2021-02-27 LAB
ALBUMIN SERPL ELPH-MCNC: 4 G/DL
ALP BLD-CCNC: 69 U/L
ALT SERPL-CCNC: 29 U/L
ANION GAP SERPL CALC-SCNC: 13 MMOL/L
APPEARANCE: ABNORMAL
AST SERPL-CCNC: 23 U/L
BACTERIA: ABNORMAL
BILIRUB SERPL-MCNC: 0.2 MG/DL
BILIRUBIN URINE: NEGATIVE
BLOOD URINE: NORMAL
BUN SERPL-MCNC: 30 MG/DL
CALCIUM SERPL-MCNC: 9.6 MG/DL
CHLORIDE SERPL-SCNC: 104 MMOL/L
CO2 SERPL-SCNC: 23 MMOL/L
COLOR: NORMAL
CREAT SERPL-MCNC: 2.61 MG/DL
GLUCOSE QUALITATIVE U: NEGATIVE
GLUCOSE SERPL-MCNC: 124 MG/DL
HYALINE CASTS: 0 /LPF
KETONES URINE: NEGATIVE
LEUKOCYTE ESTERASE URINE: ABNORMAL
MICROSCOPIC-UA: NORMAL
NITRITE URINE: POSITIVE
PH URINE: 6
POTASSIUM SERPL-SCNC: 4.6 MMOL/L
PROT SERPL-MCNC: 6.5 G/DL
PROTEIN URINE: ABNORMAL
RED BLOOD CELLS URINE: 2 /HPF
SODIUM SERPL-SCNC: 140 MMOL/L
SPECIFIC GRAVITY URINE: 1.01
SQUAMOUS EPITHELIAL CELLS: 1 /HPF
TACROLIMUS SERPL-MCNC: 6.7 NG/ML
UROBILINOGEN URINE: NORMAL
WHITE BLOOD CELLS URINE: 51 /HPF

## 2021-03-01 LAB — BACTERIA UR CULT: ABNORMAL

## 2021-03-03 ENCOUNTER — APPOINTMENT (OUTPATIENT)
Dept: NEPHROLOGY | Facility: CLINIC | Age: 71
End: 2021-03-03
Payer: MEDICARE

## 2021-03-03 VITALS
SYSTOLIC BLOOD PRESSURE: 105 MMHG | HEART RATE: 85 BPM | BODY MASS INDEX: 29.68 KG/M2 | OXYGEN SATURATION: 100 % | TEMPERATURE: 97.5 F | RESPIRATION RATE: 15 BRPM | DIASTOLIC BLOOD PRESSURE: 54 MMHG | HEIGHT: 71 IN | WEIGHT: 212 LBS

## 2021-03-03 PROCEDURE — 99214 OFFICE O/P EST MOD 30 MIN: CPT

## 2021-03-03 NOTE — HISTORY OF PRESENT ILLNESS
[de-identified] : Tube exchanged October 29th.  Pt diagnosed with Covid, wife was diagnosed with Covid on Jan 28th.  On 29th pt was tested in an urgent care, tested positive and went to [facility].  Had no symptoms.  Received the monoclonal antibody on the 29th.  Creatinine in 3's in [facility] and he was admitted and received 5 days of IV antibiotics for urine infection.\par \par Last labs reviewed before appointment - tacro 6.7, creatinine 2.6, urine turbid and has pseudomonas in urine from 2/25.  Pt has no fevers or chills.  Overall feels ok.  [TextBox_42] : Mr. Medrano is a 70 y.o male with a hsitory of ESRD since 2015 after bilateral nephrectomies for RCC s/p DDRT 2018 who presents for transplant relisting.  Of note his current eGFR is about 22.  He follows with Dr. Bonilla.\par He has known DM  since age 41, HTN.\par Has b/l nephrectomies for RCC,  left on 2015 and right 2015.\par H/o intestinal blockage and was hospitalized at [facility] in 2017. Had surgical correction. Has left UE AVF.\par \par s/p  donor kidney transplant on 18.\par HCV donor. 40 yrs old male. cold ischemia time 23 hrs. Had delayed graft function but kidney eventually worked with best creatinine 1.9. \par \par PT had several admissions post transplant.  Early on he was readmitted for anemia and hematoma. Required 2 kidney biopsies which revealed few oxalate crystals and borderline rejection for which he was treated with solumedrol 375 and prednisone taper.  He is on bicitra to reduce risk of oxalate stones.  Also admitted for CHELA few times, UTI's and CMV. \par Pt admitted for CHELA and hydronephrosis 2020. Had nephrostomy tube then NU tube placed. then admitted for UTI. Went for ureteral stricture dilation and replacement of NU tube on 8/3/20.\par \par Pt was treated for UTI in 2020.  Pt had NU tube check in September, was not changed. Going to have it rechecked/ removed tomorrow.  Creatinine now about 3.  \par \par Social: Non smoker, no alcohol or drug use.  Currently working, . \par \par Able to walk 5-6 blocks, can climb stairs.\par ROS: Has no shortness of breath on exertion. \par Fam: Parents are . Father had DM, Mother had breast cancer.\par Has no children; Wife, healthy, 61 years. 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

## 2021-03-03 NOTE — ASSESSMENT
[FreeTextEntry1] : 1. CKD of kidney transplantation - Pts aaron creatinine 1.9 but had CHELA with pyelonephritis. Has had multiple episodes of CHELA.  Pt in process of relisting.. Cell free DNA 0.2% in October end. Pt also may have a potential living donor.  Now again with CHELA s/p hospitalization at [place].  Repeat creatinine now 2.6.  He still has nephrostomy tube - will attempt to have IR internalize.  Pt has appointment with Dr. Yao next week. \par 2. Immunosuppression - simulect induction, tacrolimus target 5-7, Envarsus 4 mgs, MMF 500mgs BID.  Does not tolerate higher dose due to diarrhea and leukopenia. \par 3. DM2 - on januvia and glimepiride. BG controlled at home.  A1c at goal. \par 4. HTN - controlled\par 5. HCV - genotype 3a. completed Epclusa. Last vl note detected. \par 6. Anemia - due to CKD/CHELA.  last Hb at goal. \par 7. Chronic diarrhea - on and off on lomotil prn. Has seen GI and had a colonoscopy in 2019. Has history of small bowel resection which may be the cause of diarrhea. 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 \par 9.  UTI - recurrent likely related to NU tube.  Appears to be colonized with pseudomonas - no treatment for now. \par 10.  COVID19 - asymptomatic.  s/p monoclonal antibody.  Resolved.  \par \par f/u in 3 months. \par

## 2021-03-03 NOTE — PHYSICAL EXAM
[Neck Appearance] : the appearance of the neck was normal [Abdomen Tenderness] : non-tender [Cervical Lymph Nodes Enlarged Posterior Bilaterally] : posterior cervical [Cervical Lymph Nodes Enlarged Anterior Bilaterally] : anterior cervical [Supraclavicular Lymph Nodes Enlarged Bilaterally] : supraclavicular [No Focal Deficits] : no focal deficits [General Appearance - Alert] : alert [General Appearance - In No Acute Distress] : in no acute distress [General Appearance - Well Nourished] : well nourished [Sclera] : the sclera and conjunctiva were normal [Extraocular Movements] : extraocular movements were intact [Neck Cervical Mass (___cm)] : no neck mass was observed [] : no respiratory distress [Respiration, Rhythm And Depth] : normal respiratory rhythm and effort [Exaggerated Use Of Accessory Muscles For Inspiration] : no accessory muscle use [Auscultation Breath Sounds / Voice Sounds] : lungs were clear to auscultation bilaterally [Heart Rate And Rhythm] : heart rate was normal and rhythm regular [Heart Sounds] : normal S1 and S2 [Heart Sounds Gallop] : no gallops [Murmurs] : no murmurs [Edema] : there was no peripheral edema [Bowel Sounds] : normal bowel sounds [Abdomen Soft] : soft [Abnormal Walk] : normal gait [Skin Color & Pigmentation] : normal skin color and pigmentation [Skin Turgor] : normal skin turgor [Oriented To Time, Place, And Person] : oriented to person, place, and time [Impaired Insight] : insight and judgment were intact [Affect] : the affect was normal

## 2021-03-10 ENCOUNTER — NON-APPOINTMENT (OUTPATIENT)
Age: 71
End: 2021-03-10

## 2021-03-11 ENCOUNTER — APPOINTMENT (OUTPATIENT)
Dept: TRANSPLANT | Facility: CLINIC | Age: 71
End: 2021-03-11
Payer: MEDICARE

## 2021-03-11 VITALS
DIASTOLIC BLOOD PRESSURE: 64 MMHG | RESPIRATION RATE: 12 BRPM | HEART RATE: 80 BPM | BODY MASS INDEX: 29.68 KG/M2 | SYSTOLIC BLOOD PRESSURE: 127 MMHG | HEIGHT: 71 IN | WEIGHT: 212 LBS | TEMPERATURE: 97 F | OXYGEN SATURATION: 100 %

## 2021-03-11 PROCEDURE — 99213 OFFICE O/P EST LOW 20 MIN: CPT

## 2021-03-11 NOTE — ASSESSMENT
[FreeTextEntry1] : post DDRt with chronic ureteral stricture and marginal kidney function\par will arrange for possible repeat percutaneous dilatation and internalization of ureteral stent

## 2021-03-11 NOTE — HISTORY OF PRESENT ILLNESS
[ Donor] :  donor [TextBox_7] : July 2018 [TextBox_34] : transplant complicated by ueteral stricture presenting about 8 months post transplant\par had attempts at ureteral dilateation\par now has an NU tube in place\par has recurrent asymptomatic UTIs\par creatinine around 2.8 to 3\par he is scheduled for a prostate biopsy in two weeks\par he also has incisional hernia (post ex lap for bowel obstruction in the past)\par \par he looks generally well\par will schedule with interventional radiology for internalization of the NU tube\par \par

## 2021-03-12 LAB
ALBUMIN SERPL ELPH-MCNC: 4 G/DL
ALP BLD-CCNC: 83 U/L
ALT SERPL-CCNC: 31 U/L
ANION GAP SERPL CALC-SCNC: 12 MMOL/L
APPEARANCE: ABNORMAL
AST SERPL-CCNC: 26 U/L
BACTERIA: ABNORMAL
BASOPHILS # BLD AUTO: 0.03 K/UL
BASOPHILS NFR BLD AUTO: 0.4 %
BILIRUB SERPL-MCNC: 0.3 MG/DL
BILIRUBIN URINE: NEGATIVE
BLOOD URINE: NORMAL
BUN SERPL-MCNC: 30 MG/DL
CALCIUM SERPL-MCNC: 10.4 MG/DL
CHLORIDE SERPL-SCNC: 100 MMOL/L
CO2 SERPL-SCNC: 24 MMOL/L
COLOR: NORMAL
CREAT SERPL-MCNC: 3.21 MG/DL
CREAT SPEC-SCNC: 129 MG/DL
CREAT/PROT UR: 0.3 RATIO
EOSINOPHIL # BLD AUTO: 0.12 K/UL
EOSINOPHIL NFR BLD AUTO: 1.5 %
GLUCOSE QUALITATIVE U: NEGATIVE
GLUCOSE SERPL-MCNC: 187 MG/DL
HCT VFR BLD CALC: 33.7 %
HGB BLD-MCNC: 10.2 G/DL
HYALINE CASTS: 2 /LPF
IMM GRANULOCYTES NFR BLD AUTO: 0.6 %
KETONES URINE: NEGATIVE
LDH SERPL-CCNC: 216 U/L
LEUKOCYTE ESTERASE URINE: ABNORMAL
LYMPHOCYTES # BLD AUTO: 1.58 K/UL
LYMPHOCYTES NFR BLD AUTO: 19.7 %
MAGNESIUM SERPL-MCNC: 1.7 MG/DL
MAN DIFF?: NORMAL
MCHC RBC-ENTMCNC: 29.5 PG
MCHC RBC-ENTMCNC: 30.3 GM/DL
MCV RBC AUTO: 97.4 FL
MICROSCOPIC-UA: NORMAL
MONOCYTES # BLD AUTO: 0.47 K/UL
MONOCYTES NFR BLD AUTO: 5.9 %
NEUTROPHILS # BLD AUTO: 5.76 K/UL
NEUTROPHILS NFR BLD AUTO: 71.9 %
NITRITE URINE: NEGATIVE
PH URINE: 6.5
PHOSPHATE SERPL-MCNC: 4 MG/DL
PLATELET # BLD AUTO: 170 K/UL
POTASSIUM SERPL-SCNC: 5.1 MMOL/L
PROT SERPL-MCNC: 6.9 G/DL
PROT UR-MCNC: 34 MG/DL
PROTEIN URINE: ABNORMAL
RBC # BLD: 3.46 M/UL
RBC # FLD: 15 %
RED BLOOD CELLS URINE: 9 /HPF
SODIUM SERPL-SCNC: 137 MMOL/L
SPECIFIC GRAVITY URINE: 1.01
SQUAMOUS EPITHELIAL CELLS: 1 /HPF
TACROLIMUS SERPL-MCNC: 2.1 NG/ML
URATE SERPL-MCNC: 6.7 MG/DL
UROBILINOGEN URINE: NORMAL
WBC # FLD AUTO: 8.01 K/UL
WHITE BLOOD CELLS URINE: 206 /HPF

## 2021-03-16 ENCOUNTER — APPOINTMENT (OUTPATIENT)
Dept: UROLOGY | Facility: CLINIC | Age: 71
End: 2021-03-16
Payer: MEDICARE

## 2021-03-16 ENCOUNTER — OUTPATIENT (OUTPATIENT)
Dept: OUTPATIENT SERVICES | Facility: HOSPITAL | Age: 71
LOS: 1 days | End: 2021-03-16
Payer: MEDICARE

## 2021-03-16 VITALS
RESPIRATION RATE: 16 BRPM | TEMPERATURE: 96.8 F | SYSTOLIC BLOOD PRESSURE: 148 MMHG | DIASTOLIC BLOOD PRESSURE: 72 MMHG | HEART RATE: 76 BPM

## 2021-03-16 DIAGNOSIS — Z94.0 KIDNEY TRANSPLANT STATUS: Chronic | ICD-10-CM

## 2021-03-16 DIAGNOSIS — R97.20 ELEVATED PROSTATE, SPECIFIC ANTIGEN [PSA]: ICD-10-CM

## 2021-03-16 DIAGNOSIS — Z98.890 OTHER SPECIFIED POSTPROCEDURAL STATES: Chronic | ICD-10-CM

## 2021-03-16 DIAGNOSIS — R35.0 FREQUENCY OF MICTURITION: ICD-10-CM

## 2021-03-16 DIAGNOSIS — Z90.5 ACQUIRED ABSENCE OF KIDNEY: Chronic | ICD-10-CM

## 2021-03-16 DIAGNOSIS — I77.0 ARTERIOVENOUS FISTULA, ACQUIRED: Chronic | ICD-10-CM

## 2021-03-16 PROCEDURE — 76872 US TRANSRECTAL: CPT | Mod: 26

## 2021-03-16 PROCEDURE — 76942 ECHO GUIDE FOR BIOPSY: CPT | Mod: 26,59

## 2021-03-16 PROCEDURE — 76377 3D RENDER W/INTRP POSTPROCES: CPT | Mod: 26

## 2021-03-16 PROCEDURE — 55700: CPT

## 2021-03-16 PROCEDURE — 76942 ECHO GUIDE FOR BIOPSY: CPT | Mod: 59

## 2021-03-16 PROCEDURE — 55700: CPT | Mod: 22

## 2021-03-16 PROCEDURE — 76872 US TRANSRECTAL: CPT

## 2021-03-17 ENCOUNTER — NON-APPOINTMENT (OUTPATIENT)
Age: 71
End: 2021-03-17

## 2021-03-17 PROBLEM — R97.20 ELEVATED PSA: Status: ACTIVE | Noted: 2020-12-07

## 2021-03-17 LAB
BKV DNA SPEC QL NAA+PROBE: NEGATIVE COPIES/ML
CMV DNA SPEC QL NAA+PROBE: ABNORMAL IU/ML
LOG 10 BK QUANTITATION PCR: NORMAL

## 2021-03-18 DIAGNOSIS — R97.20 ELEVATED PROSTATE SPECIFIC ANTIGEN [PSA]: ICD-10-CM

## 2021-03-18 DIAGNOSIS — R93.5 ABNORMAL FINDINGS ON DIAGNOSTIC IMAGING OF OTHER ABDOMINAL REGIONS, INCLUDING RETROPERITONEUM: ICD-10-CM

## 2021-03-21 LAB — CORE LAB BIOPSY: NORMAL

## 2021-03-23 ENCOUNTER — APPOINTMENT (OUTPATIENT)
Dept: UROLOGY | Facility: CLINIC | Age: 71
End: 2021-03-23
Payer: MEDICARE

## 2021-03-23 VITALS
BODY MASS INDEX: 29.68 KG/M2 | SYSTOLIC BLOOD PRESSURE: 118 MMHG | HEIGHT: 71 IN | TEMPERATURE: 97 F | HEART RATE: 82 BPM | WEIGHT: 212 LBS | RESPIRATION RATE: 16 BRPM | DIASTOLIC BLOOD PRESSURE: 59 MMHG

## 2021-03-23 DIAGNOSIS — R93.5 ABNORMAL FINDINGS ON DIAGNOSTIC IMAGING OF OTHER ABDOMINAL REGIONS, INCLUDING RETROPERITONEUM: ICD-10-CM

## 2021-03-23 PROCEDURE — 99215 OFFICE O/P EST HI 40 MIN: CPT

## 2021-03-23 RX ORDER — DIAZEPAM 5 MG/1
5 TABLET ORAL
Qty: 1 | Refills: 0 | Status: DISCONTINUED | COMMUNITY
Start: 2021-03-10 | End: 2021-03-23

## 2021-03-23 NOTE — ASSESSMENT
[FreeTextEntry1] : I had a discussion today in the office with the patient regarding potential treatment options for his newly diagnosed prostate cancer. We discussed observation versus radiation therapy versus surgery.\par \par With regard to observation, we discussed a surveillance protocol including performance of PSA and digital rectal exam several times each year. I explained to the patient that if both exams remains unchanged and he may require followup biopsy at 18-24 months months.  However if either exam changes significantly with the increase in PSA or a new abnormal finding on rectal exam, that a biopsy would be indicated earlier. We also discussed the possibility of using prostate imaging with MRI to evaluate the prostate over the course of surveillance as a means of either using a targeted biopsy approach if lesions were visualized and possibly avoiding biopsy if the MRI findings are negative and if other clinical indicators would predict lower likelihood of clinically significant prostate cancer such as PSA density.\par \par With regard to radiation therapy, we discussed the different modalities to deliver the radiation in general. I explained that both seeds and external beam radiation therapy or a combination of the 2 would be options to discuss with the radiation oncologist should he seek their consultation. We also reviewed the potential risks and side effects of radiation therapy including development or worsening lower urinary tract symptoms including urgency and frequency in particular. I also explained to the patient that similar symptoms can occur with bowel movements. The relatively low but increased risk of secondary malignancy was also reviewed for both the bladder and the rectum.  The risk of developing urethral stricture or urinary incontinence was also discussed, and the potential for erectile dysfunction was also reviewed. I also explained to the patient that hormonal therapy may be required in the neoadjuvant fashion prior to initiation of radiation treatment as the outcomes are generally superior and using this approach versus radiation therapy alone. We discussed potential side effects of the hormonal therapy including hot flashes, loss of libido, erectile dysfunction, fatigue, loss of muscle mass, and loss of bone density.\par \par With regard to surgical treatment, we discussed risks and benefits of both open and robotic assisted laparoscopic techniques. I explained that both procedures are performed with the goal of removing the prostate and seminal vesicles in their entirety and that often pelvic lymph node dissection was also performed for the purposes of staging if indicated. I also advised the patient that he would need to avoid any blood thinning medication for one week ahead of time including, but not limited to, Plavix and Coumadin. I discussed the expected hospital course with the patient including a 1-2 night hospital stay and the need for Velez catheter in place after the procedure. I explained that this would typically remain in place for approximately 7 days. We also reviewed the expected postoperative recovery. Which I informed him would be most bothersome initially with a Velez catheter and that his energy level will be down for between 3-4 weeks after surgery. I advised him that he would not able to be doing any heavy lifting for 6 weeks following the procedure. We also discussed potential risks and complications of surgery including postoperative bleeding, injury to another intra-abdominal structure, and infection. Significant time was also dedicated to reviewing the potential side effects of surgery including the possibility of urinary incontinence and erectile dysfunction. I reviewed with the patient that almost all patients are initially incontinent and unable to achieve erections, but over time these functions can return to the pre-operative level. I have explained that the urinary control is far more likely to return than erectile function.  We reviewed the importance of performance of kegel exercises after surgery to help regain continence.  I also reviewed several strategies for penile rehabilitation.\par \par I explained followup strategy for both types of treatments including performance serial PSAs as well as rectal examination.\par \par I have explained to him that I did not feel as though surveillance here was appropriate given that he is found to have fairly high volume disease with components of unfavorable intermediate risk prostate cancer.  I also think that given his prior surgical history with a bowel obstruction and ileostomy that surgical intervention here would probably be less preferable to radiation therapy as he will likely have significant intra-abdominal scar tissue that may increase the risk of surgical intervention.  I am referring him to a radiation oncologist for further discussion.  I have explained to him that he would be recommended to use neoadjuvant androgen deprivation therapy prior which I have prescribed to him.

## 2021-03-23 NOTE — HISTORY OF PRESENT ILLNESS
[FreeTextEntry1] : Damir Medrano returns to the office today.  He is a 70-year-old man who is here today for follow-up after undergoing prostate biopsy.  He feels well since the procedure and denies any residual pain or discomfort.  He has no abdominal pain, nausea, or vomiting.  He has no change in his baseline urination.  He denies constipation.\par \par The biopsy results show Mauricio pattern 4+3 adenocarcinoma involving up to 80% of core length.  8 out of 14 biopsy samples were positive for malignancy.  The PSA level done prior to biopsy was 5.27 with a percent free fraction.  Prostate MRI showed a PI-RADS category 3 lesion at the left peripheral zone apex posteriorly.  There did not appear to be any evidence of extraprostatic disease.\par \par The patient's prior surgical history is notable for a renal transplant that was performed in July 2018.  He also has prior history of what sounds like a bowel obstruction and he had previously undergone ileostomy.\par

## 2021-04-01 ENCOUNTER — OUTPATIENT (OUTPATIENT)
Dept: OUTPATIENT SERVICES | Facility: HOSPITAL | Age: 71
LOS: 1 days | Discharge: HOME | End: 2021-04-01
Payer: MEDICARE

## 2021-04-01 ENCOUNTER — APPOINTMENT (OUTPATIENT)
Dept: RADIATION ONCOLOGY | Facility: CLINIC | Age: 71
End: 2021-04-01
Payer: MEDICARE

## 2021-04-01 ENCOUNTER — NON-APPOINTMENT (OUTPATIENT)
Age: 71
End: 2021-04-01

## 2021-04-01 VITALS — RESPIRATION RATE: 16 BRPM | WEIGHT: 210 LBS | BODY MASS INDEX: 29.4 KG/M2 | HEIGHT: 71 IN

## 2021-04-01 DIAGNOSIS — Z90.5 ACQUIRED ABSENCE OF KIDNEY: Chronic | ICD-10-CM

## 2021-04-01 DIAGNOSIS — Z94.0 KIDNEY TRANSPLANT STATUS: Chronic | ICD-10-CM

## 2021-04-01 DIAGNOSIS — I77.0 ARTERIOVENOUS FISTULA, ACQUIRED: Chronic | ICD-10-CM

## 2021-04-01 DIAGNOSIS — Z98.890 OTHER SPECIFIED POSTPROCEDURAL STATES: Chronic | ICD-10-CM

## 2021-04-01 PROCEDURE — 77263 THER RADIOLOGY TX PLNG CPLX: CPT

## 2021-04-01 PROCEDURE — 99204 OFFICE O/P NEW MOD 45 MIN: CPT | Mod: 25

## 2021-04-05 ENCOUNTER — RX RENEWAL (OUTPATIENT)
Age: 71
End: 2021-04-05

## 2021-04-05 ENCOUNTER — APPOINTMENT (OUTPATIENT)
Dept: NEPHROLOGY | Facility: CLINIC | Age: 71
End: 2021-04-05
Payer: MEDICARE

## 2021-04-05 VITALS
WEIGHT: 203 LBS | RESPIRATION RATE: 16 BRPM | BODY MASS INDEX: 28.42 KG/M2 | HEART RATE: 99 BPM | TEMPERATURE: 97.8 F | OXYGEN SATURATION: 98 % | HEIGHT: 71 IN | SYSTOLIC BLOOD PRESSURE: 137 MMHG | DIASTOLIC BLOOD PRESSURE: 66 MMHG

## 2021-04-05 LAB
25(OH)D3 SERPL-MCNC: 42.1 NG/ML
ALBUMIN SERPL ELPH-MCNC: 4.3 G/DL
ALP BLD-CCNC: 73 U/L
ALT SERPL-CCNC: 11 U/L
ANION GAP SERPL CALC-SCNC: 12 MMOL/L
APPEARANCE: CLEAR
AST SERPL-CCNC: 20 U/L
BACTERIA: ABNORMAL
BASOPHILS # BLD AUTO: 0.05 K/UL
BASOPHILS NFR BLD AUTO: 0.7 %
BILIRUB SERPL-MCNC: 0.2 MG/DL
BILIRUBIN URINE: NEGATIVE
BLOOD URINE: NORMAL
BUN SERPL-MCNC: 28 MG/DL
CALCIUM SERPL-MCNC: 10.3 MG/DL
CALCIUM SERPL-MCNC: 10.3 MG/DL
CHLORIDE SERPL-SCNC: 105 MMOL/L
CHOLEST SERPL-MCNC: 120 MG/DL
CO2 SERPL-SCNC: 24 MMOL/L
COLOR: NORMAL
CREAT SERPL-MCNC: 2.93 MG/DL
CREAT SPEC-SCNC: 121 MG/DL
CREAT/PROT UR: 0.2 RATIO
EOSINOPHIL # BLD AUTO: 0.12 K/UL
EOSINOPHIL NFR BLD AUTO: 1.6 %
ESTIMATED AVERAGE GLUCOSE: 143 MG/DL
GLUCOSE QUALITATIVE U: NEGATIVE
GLUCOSE SERPL-MCNC: 108 MG/DL
HBA1C MFR BLD HPLC: 6.6 %
HCT VFR BLD CALC: 35.1 %
HDLC SERPL-MCNC: 54 MG/DL
HGB BLD-MCNC: 10.2 G/DL
HYALINE CASTS: 2 /LPF
IMM GRANULOCYTES NFR BLD AUTO: 0.8 %
KETONES URINE: NEGATIVE
LDH SERPL-CCNC: 167 U/L
LDLC SERPL CALC-MCNC: 44 MG/DL
LEUKOCYTE ESTERASE URINE: ABNORMAL
LYMPHOCYTES # BLD AUTO: 2.25 K/UL
LYMPHOCYTES NFR BLD AUTO: 30.2 %
MAGNESIUM SERPL-MCNC: 1.6 MG/DL
MAN DIFF?: NORMAL
MCHC RBC-ENTMCNC: 28.5 PG
MCHC RBC-ENTMCNC: 29.1 GM/DL
MCV RBC AUTO: 98 FL
MICROSCOPIC-UA: NORMAL
MONOCYTES # BLD AUTO: 0.81 K/UL
MONOCYTES NFR BLD AUTO: 10.9 %
NEUTROPHILS # BLD AUTO: 4.15 K/UL
NEUTROPHILS NFR BLD AUTO: 55.8 %
NITRITE URINE: POSITIVE
NONHDLC SERPL-MCNC: 66 MG/DL
PARATHYROID HORMONE INTACT: 45 PG/ML
PH URINE: 6
PHOSPHATE SERPL-MCNC: 3.7 MG/DL
PLATELET # BLD AUTO: 220 K/UL
POTASSIUM SERPL-SCNC: 5.1 MMOL/L
PROT SERPL-MCNC: 6.9 G/DL
PROT UR-MCNC: 21 MG/DL
PROTEIN URINE: NORMAL
RBC # BLD: 3.58 M/UL
RBC # FLD: 14.7 %
RED BLOOD CELLS URINE: 4 /HPF
SODIUM SERPL-SCNC: 141 MMOL/L
SPECIFIC GRAVITY URINE: 1.02
SQUAMOUS EPITHELIAL CELLS: 1 /HPF
TACROLIMUS SERPL-MCNC: <2 NG/ML
TRIGL SERPL-MCNC: 114 MG/DL
URATE SERPL-MCNC: 6.9 MG/DL
UROBILINOGEN URINE: NORMAL
WBC # FLD AUTO: 7.44 K/UL
WHITE BLOOD CELLS URINE: 104 /HPF

## 2021-04-05 PROCEDURE — 99214 OFFICE O/P EST MOD 30 MIN: CPT

## 2021-04-05 RX ORDER — TACROLIMUS 1 MG/1
1 TABLET, EXTENDED RELEASE ORAL
Qty: 90 | Refills: 3 | Status: DISCONTINUED | COMMUNITY
Start: 2021-03-12 | End: 2021-04-05

## 2021-04-05 NOTE — ASSESSMENT
[FreeTextEntry1] : 1. CKD of kidney transplantation - Pts aaron creatinine 1.9 but had CHELA with pyelonephritis. Has had multiple episodes of CHELA.  Pt in process of relisting but on hold due to prostate cancer.  Cell free DNA 0.2% in October end.  Repeat creatinine now 3.2.  He still has nephrostomy tube - will attempt to have IR internalize.  Repeat creatinine today. \par 2. Immunosuppression - simulect induction, tacrolimus target 5-7, Envarsus 5 mgs, MMF 500mgs BID.  Does not tolerate higher dose due to diarrhea and leukopenia.  Discussed need to be compliant with increases in Envarsus dosing.  Discussed that if he does not have the med to call us and pharmacy asap. \par 3. DM2 - on januvia and glimepiride. BG controlled at home.  A1c at goal. \par 4. HTN - controlled\par 5. HCV - genotype 3a. completed Epclusa. Last vl note detected. \par 6. Anemia - due to CKD/CHELA.  last Hb at goal. \par 7. Chronic diarrhea - on and off on lomotil prn. Has seen GI and had a colonoscopy in 2019. Has history of small bowel resection which may be the cause of diarrhea. 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 \par 9.  UTI - recurrent likely related to NU tube.  Appears to be colonized with pseudomonas - no treatment for now. \par 10.  COVID19 - asymptomatic.  s/p monoclonal antibody.  Resolved.  Received monoclonal antibody - can receive vaccine end of April. \par 11.  Prostate cancer - Pt on androgen depletion and starting RT in near future.  \par \par f/u in 3 months. \par

## 2021-04-05 NOTE — HISTORY OF PRESENT ILLNESS
[FreeTextEntry1] : 69 y/o male with h/o kidney transplant (bilateral nephrectomy) in 2018, HTN, DM2, newly diagnosed prostate cancer presents to discuss radiation treatment for prostate cancer. \par \par Pathology in 3/2021 showed [place] 4+3 in 2 cores with 80% of tissue involvement, [place] 3+4 in 4 cores with 60% involvement, [place] 3+3 in 2 cores with 10% involvement. Total 14 biopsy cores, with 7 cores positive with cancer. \par \par PSA 5.27 in 12/2020, PSA 4.29 in 10/2020, PSA 3.41 in 5/2018\par \par MRI in 2/2021 showed PIRADS 3, prostate vol 32cc, capsule intact. \par \par Side effects from radiation reviewed with Pt, including but not limited to urinary bother, bowel movement issues, erectile dysfunctions, tissue necrosis, fistular. \par \par \par \par \par \par Pt was given Orgovyx for ADT from urologist, will start soon.

## 2021-04-05 NOTE — REVIEW OF SYSTEMS
[IPSS Score (0-40): ___] : IPSS score: [unfilled] [EPIC-CP Score (0-60): ___] : EPIC-CP score: [unfilled] [Negative] : Allergic/Immunologic [FreeTextEntry5] : HTN [de-identified] : DM2 [FreeTextEntry8] : h/o kidney transplant 2018

## 2021-04-05 NOTE — PHYSICAL EXAM
[General Appearance - Alert] : alert [General Appearance - In No Acute Distress] : in no acute distress [General Appearance - Well Nourished] : well nourished [Sclera] : the sclera and conjunctiva were normal [Extraocular Movements] : extraocular movements were intact [Neck Appearance] : the appearance of the neck was normal [Neck Cervical Mass (___cm)] : no neck mass was observed [] : no respiratory distress [Respiration, Rhythm And Depth] : normal respiratory rhythm and effort [Exaggerated Use Of Accessory Muscles For Inspiration] : no accessory muscle use [Auscultation Breath Sounds / Voice Sounds] : lungs were clear to auscultation bilaterally [Heart Rate And Rhythm] : heart rate was normal and rhythm regular [Heart Sounds] : normal S1 and S2 [Heart Sounds Gallop] : no gallops [Murmurs] : no murmurs [Edema] : there was no peripheral edema [Bowel Sounds] : normal bowel sounds [Abdomen Soft] : soft [Abdomen Tenderness] : non-tender [Cervical Lymph Nodes Enlarged Posterior Bilaterally] : posterior cervical [Cervical Lymph Nodes Enlarged Anterior Bilaterally] : anterior cervical [Supraclavicular Lymph Nodes Enlarged Bilaterally] : supraclavicular [Abnormal Walk] : normal gait [Skin Color & Pigmentation] : normal skin color and pigmentation [Skin Turgor] : normal skin turgor [No Focal Deficits] : no focal deficits [Oriented To Time, Place, And Person] : oriented to person, place, and time [Impaired Insight] : insight and judgment were intact [Affect] : the affect was normal

## 2021-04-05 NOTE — HISTORY OF PRESENT ILLNESS
[de-identified] : Tube exchanged October 29th.  Pt diagnosed with Covid, wife was diagnosed with Covid on Jan 28th.  On 29th pt was tested in an urgent care, tested positive and went to [facility].  Had no symptoms.  Received the monoclonal antibody on the 29th.  Creatinine in 3's in [facility] and he was admitted and received 5 days of IV antibiotics for urine infection.\par \par Last labs reviewed before appointment - creatinine 3.42.  tacro was low - pt was supposed to increase dose to 5 but does not have evarsus 1mg pills so did not do it yet.  Planned to call pharmacy today.  Also diagnosed with prostate cancer - starte don anti -androgen agent and will start RT in the future. Overall feels ok.  [TextBox_42] : Mr. Medrano is a 70 y.o male with a hsitory of ESRD since 2015 after bilateral nephrectomies for RCC s/p DDRT 2018 who presents for transplant relisting.  Of note his current eGFR is about 22.  He follows with Dr. Bonilla.\par He has known DM  since age 41, HTN.\par Has b/l nephrectomies for RCC,  left on 2015 and right 2015.\par H/o intestinal blockage and was hospitalized at [facility] in 2017. Had surgical correction. Has left UE AVF.\par \par s/p  donor kidney transplant on 18.\par HCV donor. 40 yrs old male. cold ischemia time 23 hrs. Had delayed graft function but kidney eventually worked with best creatinine 1.9. \par \par PT had several admissions post transplant.  Early on he was readmitted for anemia and hematoma. Required 2 kidney biopsies which revealed few oxalate crystals and borderline rejection for which he was treated with solumedrol 375 and prednisone taper.  He is on bicitra to reduce risk of oxalate stones.  Also admitted for CHELA few times, UTI's and CMV. \par Pt admitted for CHELA and hydronephrosis 2020. Had nephrostomy tube then NU tube placed. then admitted for UTI. Went for ureteral stricture dilation and replacement of NU tube on 8/3/20.\par \par Pt was treated for UTI in 2020.  Pt had NU tube check in September, was not changed. Going to have it rechecked/ removed tomorrow.  Creatinine now about 3.  \par \par Social: Non smoker, no alcohol or drug use.  Currently working, . \par \par Able to walk 5-6 blocks, can climb stairs.\par ROS: Has no shortness of breath on exertion. \par Fam: Parents are . Father had DM, Mother had breast cancer.\par Has no children; Wife, healthy, 61 years. 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

## 2021-04-08 ENCOUNTER — NON-APPOINTMENT (OUTPATIENT)
Age: 71
End: 2021-04-08

## 2021-04-08 LAB
BACTERIA UR CULT: ABNORMAL
CMV DNA SPEC QL NAA+PROBE: ABNORMAL IU/ML

## 2021-04-09 ENCOUNTER — TRANSCRIPTION ENCOUNTER (OUTPATIENT)
Age: 71
End: 2021-04-09

## 2021-04-12 ENCOUNTER — APPOINTMENT (OUTPATIENT)
Dept: TRANSPLANT | Facility: CLINIC | Age: 71
End: 2021-04-12

## 2021-04-12 LAB
ALBUMIN SERPL ELPH-MCNC: 4.2 G/DL
ALP BLD-CCNC: 70 U/L
ALT SERPL-CCNC: 15 U/L
ANION GAP SERPL CALC-SCNC: 11 MMOL/L
APPEARANCE: CLEAR
AST SERPL-CCNC: 24 U/L
BACTERIA: ABNORMAL
BASOPHILS # BLD AUTO: 0.03 K/UL
BASOPHILS NFR BLD AUTO: 0.4 %
BILIRUB SERPL-MCNC: 0.2 MG/DL
BILIRUBIN URINE: NEGATIVE
BKV DNA SPEC QL NAA+PROBE: NEGATIVE COPIES/ML
BLOOD URINE: NORMAL
BUN SERPL-MCNC: 40 MG/DL
CALCIUM SERPL-MCNC: 10.4 MG/DL
CHLORIDE SERPL-SCNC: 105 MMOL/L
CO2 SERPL-SCNC: 26 MMOL/L
COLOR: NORMAL
CREAT SERPL-MCNC: 3.2 MG/DL
CREAT SPEC-SCNC: 126 MG/DL
CREAT/PROT UR: 0.2 RATIO
EOSINOPHIL # BLD AUTO: 0.09 K/UL
EOSINOPHIL NFR BLD AUTO: 1.3 %
GLUCOSE QUALITATIVE U: NEGATIVE
GLUCOSE SERPL-MCNC: 145 MG/DL
HCT VFR BLD CALC: 34.8 %
HGB BLD-MCNC: 10.5 G/DL
HYALINE CASTS: 2 /LPF
IMM GRANULOCYTES NFR BLD AUTO: 0.6 %
KETONES URINE: NEGATIVE
LDH SERPL-CCNC: 154 U/L
LEUKOCYTE ESTERASE URINE: ABNORMAL
LOG 10 BK QUANTITATION PCR: NORMAL
LYMPHOCYTES # BLD AUTO: 2.4 K/UL
LYMPHOCYTES NFR BLD AUTO: 35.6 %
MAGNESIUM SERPL-MCNC: 1.6 MG/DL
MAN DIFF?: NORMAL
MCHC RBC-ENTMCNC: 29.3 PG
MCHC RBC-ENTMCNC: 30.2 GM/DL
MCV RBC AUTO: 97.2 FL
MICROSCOPIC-UA: NORMAL
MONOCYTES # BLD AUTO: 0.76 K/UL
MONOCYTES NFR BLD AUTO: 11.3 %
NEUTROPHILS # BLD AUTO: 3.42 K/UL
NEUTROPHILS NFR BLD AUTO: 50.8 %
NITRITE URINE: POSITIVE
PH URINE: 6
PHOSPHATE SERPL-MCNC: 3.2 MG/DL
PLATELET # BLD AUTO: 189 K/UL
POTASSIUM SERPL-SCNC: 5 MMOL/L
PROT SERPL-MCNC: 6.6 G/DL
PROT UR-MCNC: 19 MG/DL
PROTEIN URINE: NORMAL
RBC # BLD: 3.58 M/UL
RBC # FLD: 15 %
RED BLOOD CELLS URINE: 7 /HPF
SODIUM SERPL-SCNC: 142 MMOL/L
SPECIFIC GRAVITY URINE: 1.02
SQUAMOUS EPITHELIAL CELLS: 0 /HPF
TACROLIMUS SERPL-MCNC: 11.2 NG/ML
URATE SERPL-MCNC: 7.1 MG/DL
UROBILINOGEN URINE: NORMAL
WBC # FLD AUTO: 6.74 K/UL
WHITE BLOOD CELLS URINE: 76 /HPF

## 2021-04-14 LAB — BACTERIA UR CULT: ABNORMAL

## 2021-04-20 ENCOUNTER — OUTPATIENT (OUTPATIENT)
Dept: OUTPATIENT SERVICES | Facility: HOSPITAL | Age: 71
LOS: 1 days | End: 2021-04-20
Payer: MEDICARE

## 2021-04-20 ENCOUNTER — NON-APPOINTMENT (OUTPATIENT)
Age: 71
End: 2021-04-20

## 2021-04-20 DIAGNOSIS — Z94.0 KIDNEY TRANSPLANT STATUS: Chronic | ICD-10-CM

## 2021-04-20 DIAGNOSIS — Z90.5 ACQUIRED ABSENCE OF KIDNEY: Chronic | ICD-10-CM

## 2021-04-20 DIAGNOSIS — Z98.890 OTHER SPECIFIED POSTPROCEDURAL STATES: Chronic | ICD-10-CM

## 2021-04-20 DIAGNOSIS — Z11.52 ENCOUNTER FOR SCREENING FOR COVID-19: ICD-10-CM

## 2021-04-20 DIAGNOSIS — I77.0 ARTERIOVENOUS FISTULA, ACQUIRED: Chronic | ICD-10-CM

## 2021-04-20 LAB — SARS-COV-2 RNA SPEC QL NAA+PROBE: SIGNIFICANT CHANGE UP

## 2021-04-22 ENCOUNTER — OUTPATIENT (OUTPATIENT)
Dept: OUTPATIENT SERVICES | Facility: HOSPITAL | Age: 71
LOS: 1 days | End: 2021-04-22
Payer: MEDICARE

## 2021-04-22 ENCOUNTER — RESULT REVIEW (OUTPATIENT)
Age: 71
End: 2021-04-22

## 2021-04-22 VITALS
HEART RATE: 75 BPM | DIASTOLIC BLOOD PRESSURE: 64 MMHG | SYSTOLIC BLOOD PRESSURE: 120 MMHG | OXYGEN SATURATION: 100 % | RESPIRATION RATE: 16 BRPM

## 2021-04-22 VITALS
DIASTOLIC BLOOD PRESSURE: 69 MMHG | RESPIRATION RATE: 16 BRPM | TEMPERATURE: 98 F | HEIGHT: 70.5 IN | SYSTOLIC BLOOD PRESSURE: 136 MMHG | OXYGEN SATURATION: 99 % | HEART RATE: 90 BPM | WEIGHT: 210.1 LBS

## 2021-04-22 DIAGNOSIS — Z98.890 OTHER SPECIFIED POSTPROCEDURAL STATES: Chronic | ICD-10-CM

## 2021-04-22 DIAGNOSIS — Z90.5 ACQUIRED ABSENCE OF KIDNEY: Chronic | ICD-10-CM

## 2021-04-22 DIAGNOSIS — Z94.0 KIDNEY TRANSPLANT STATUS: ICD-10-CM

## 2021-04-22 DIAGNOSIS — Z94.0 KIDNEY TRANSPLANT STATUS: Chronic | ICD-10-CM

## 2021-04-22 DIAGNOSIS — I77.0 ARTERIOVENOUS FISTULA, ACQUIRED: Chronic | ICD-10-CM

## 2021-04-22 LAB
BASE EXCESS BLDV CALC-SCNC: -2.5 MMOL/L — LOW (ref -2–2)
CA-I SERPL-SCNC: 1.34 MMOL/L — HIGH (ref 1.12–1.3)
CHLORIDE BLDV-SCNC: 110 MMOL/L — HIGH (ref 96–108)
CO2 BLDV-SCNC: 24 MMOL/L — SIGNIFICANT CHANGE UP (ref 22–30)
GAS PNL BLDV: 138 MMOL/L — SIGNIFICANT CHANGE UP (ref 135–145)
GAS PNL BLDV: SIGNIFICANT CHANGE UP
GAS PNL BLDV: SIGNIFICANT CHANGE UP
GLUCOSE BLDC GLUCOMTR-MCNC: 174 MG/DL — HIGH (ref 70–99)
GLUCOSE BLDV-MCNC: 150 MG/DL — HIGH (ref 70–99)
HCO3 BLDV-SCNC: 23 MMOL/L — SIGNIFICANT CHANGE UP (ref 21–29)
HCT VFR BLDA CALC: 33 % — LOW (ref 39–50)
HGB BLD CALC-MCNC: 10.7 G/DL — LOW (ref 13–17)
HOROWITZ INDEX BLDV+IHG-RTO: SIGNIFICANT CHANGE UP
LACTATE BLDV-MCNC: 0.9 MMOL/L — SIGNIFICANT CHANGE UP (ref 0.7–2)
OTHER CELLS CSF MANUAL: 11 ML/DL — LOW (ref 18–22)
PCO2 BLDV: 46 MMHG — SIGNIFICANT CHANGE UP (ref 42–55)
PH BLDV: 7.32 — LOW (ref 7.35–7.45)
PO2 BLDV: 46 MMHG — HIGH (ref 25–45)
POTASSIUM BLDV-SCNC: 4.7 MMOL/L — SIGNIFICANT CHANGE UP (ref 3.5–5.3)
SAO2 % BLDV: 76 % — SIGNIFICANT CHANGE UP (ref 67–88)

## 2021-04-22 PROCEDURE — U0005: CPT

## 2021-04-22 PROCEDURE — C1887: CPT

## 2021-04-22 PROCEDURE — 50693 PLMT URETERAL STENT PRQ: CPT | Mod: RT

## 2021-04-22 PROCEDURE — C1769: CPT

## 2021-04-22 PROCEDURE — 82803 BLOOD GASES ANY COMBINATION: CPT

## 2021-04-22 PROCEDURE — 82947 ASSAY GLUCOSE BLOOD QUANT: CPT

## 2021-04-22 PROCEDURE — 84132 ASSAY OF SERUM POTASSIUM: CPT

## 2021-04-22 PROCEDURE — 85014 HEMATOCRIT: CPT

## 2021-04-22 PROCEDURE — 84295 ASSAY OF SERUM SODIUM: CPT

## 2021-04-22 PROCEDURE — U0003: CPT

## 2021-04-22 PROCEDURE — 82962 GLUCOSE BLOOD TEST: CPT

## 2021-04-22 PROCEDURE — 82435 ASSAY OF BLOOD CHLORIDE: CPT

## 2021-04-22 PROCEDURE — 82330 ASSAY OF CALCIUM: CPT

## 2021-04-22 PROCEDURE — 85018 HEMOGLOBIN: CPT

## 2021-04-22 PROCEDURE — 50693 PLMT URETERAL STENT PRQ: CPT

## 2021-04-22 PROCEDURE — C9803: CPT

## 2021-04-22 PROCEDURE — 83605 ASSAY OF LACTIC ACID: CPT

## 2021-04-22 RX ORDER — ONDANSETRON 8 MG/1
4 TABLET, FILM COATED ORAL ONCE
Refills: 0 | Status: DISCONTINUED | OUTPATIENT
Start: 2021-04-22 | End: 2021-05-06

## 2021-04-22 NOTE — PRE PROCEDURE NOTE - PRE PROCEDURE EVALUATION
Interventional Radiology    HPI: 70 year old male with right lower quadrant transplant kidney with nephro-ureteral catheter in place presenting to IR for ureteral stent placement with possible external PCN.     Allergies: NKDA  Medications (Abx/Cardiac/Anticoagulation/Blood Products): N/A    Exam  General: No acute distress  Chest: Non labored breathing  Abdomen: Non-distended  Extremities: No swelling, warm      Plan: 70y Male presents for transplant ureteral stent placement.  -Risks/Benefits/alternatives explained with the patient and witnessed informed consent obtained.

## 2021-04-22 NOTE — ASU PATIENT PROFILE, ADULT - DOMESTIC TRAVEL HIGH RISK QUESTION
Subjective:     Kaley Gomez is a 16 y.o. female here with mother. Patient brought in for Well Child (nnamdi and bm- good. 12th @ [facility]. Brought in by mom- Yenifer. )       History was provided by the mother.    Kaley Gomez is a 16 y.o. female who is here for this well-child visit.    Current Issues:  Current concerns include none.  Currently menstruating? yes; current menstrual pattern: flow is moderate  Sexually active? No   Does patient snore? no     Review of Nutrition:  Current diet: family meals  Balanced diet? yes    Social Screening:   Parental relations: good  Sibling relations: brothers: 1  Discipline concerns? no  Concerns regarding behavior with peers? no  School performance: doing well; no concerns  Secondhand smoke exposure? no    Screening Questions:  Risk factors for anemia: no  Risk factors for vision problems: no  Risk factors for hearing problems: no  Risk factors for tuberculosis: no  Risk factors for dyslipidemia: no  Risk factors for sexually-transmitted infections: no  Risk factors for alcohol/drug use:  no    Review of Systems   Constitutional: Negative.  Negative for activity change, appetite change and fever.   HENT: Negative.  Negative for congestion and sore throat.    Eyes: Negative.  Negative for discharge and redness.   Respiratory: Negative.  Negative for cough and wheezing.    Cardiovascular: Negative.  Negative for chest pain and palpitations.   Gastrointestinal: Negative.  Negative for constipation, diarrhea and vomiting.   Genitourinary: Negative.  Negative for difficulty urinating and hematuria.   Musculoskeletal: Negative.    Skin: Negative.  Negative for rash and wound.   Neurological: Negative.  Negative for syncope and headaches.   Psychiatric/Behavioral: Negative.  Negative for behavioral problems and sleep disturbance.         Objective:     Physical Exam   Constitutional: Vital signs are normal. She appears well-developed.   HENT:   Head: Normocephalic.   Right Ear:  Hearing and external ear normal.   Left Ear: Hearing and external ear normal.   Nose: Nose normal.   Mouth/Throat: Uvula is midline, oropharynx is clear and moist and mucous membranes are normal.   Eyes: Conjunctivae are normal. Pupils are equal, round, and reactive to light.   Neck: Normal range of motion. Neck supple.   Cardiovascular: Normal rate, regular rhythm and normal heart sounds.    No murmur heard.  Pulmonary/Chest: Effort normal and breath sounds normal.   Abdominal: Soft. She exhibits no distension.   Genitourinary:   Genitourinary Comments: Normal Pubertal  and Breast   Musculoskeletal: Normal range of motion.   Lymphadenopathy:     She has no cervical adenopathy.   Neurological: She is alert.   Skin: Skin is warm. No lesion noted. No erythema.   Psychiatric: She has a normal mood and affect. Her behavior is normal. Thought content normal.       Assessment:      Well adolescent.      Plan:      1. Anticipatory guidance discussed.  Gave handout on well-child issues at this age.    2.  Weight management:  The patient was counseled regarding physical activity  3. Immunizations today: per orders.      No

## 2021-04-22 NOTE — ASU DISCHARGE PLAN (ADULT/PEDIATRIC) - ASU DC SPECIAL INSTRUCTIONSFT
Transplant ureteral stent placement    **Please follow up with Dr. Lomax within the next month**    Discharge Instructions  - You have had a ureteral stent placed in your transplant kidney.  - Keep the area clean and dry.  - You may resume your normal diet.  - You may resume your normal medications.  - It is normal to experience some pain over the site for the next few days. You may take apply ice to the area (20 minutes on, 20 minutes off) and take Tylenol for that pain. Do not take more frequently than every 6 hours and do not exceed more than 3000mg of Tylenol in a 24 hour period.    Notify your primary physician and/or Interventional Radiology IMMEDIATELY if you experience any of the following       - Fever of 100.4F  or 38C       - Chills or Rigors/ Shakes       - Swelling and/or Redness in the area of the puncture site       - Worsening Pain       - Blood soaked bandages or worsening bleeding       - Lightheadedness and/or dizziness upon standing       - Chest Pain/ Tightness       - Shortness of Breath       - Difficulty walking    If you have a problem that you believe requires IMMEDIATE attention, please go to your NEAREST Emergency Room. If you believe your problem can safely wait until you speak to a physician, please call Interventional Radiology for any concerns.    During Normal Weekday Business Hours- You can contact the Interventional Radiology department during normal business hours via telephone.  During Evenings and Weekends- If you need to contact Interventional Radiology during off hours, do so by calling the hospital and requesting to be connected to the Interventional Radiologist on call.

## 2021-04-22 NOTE — ASU PATIENT PROFILE, ADULT - PSH
AV fistula  2013/ left forearm  H/O ileostomy  '    for 3 months. s/p Reversal  Kidney transplant recipient  2018  @ [facility] :  +  Hep C Donor  S/p nephrectomy  right 12/10/2015   benign  S/p nephrectomy  left 9/15/2015   + Cancer  S/P right knee arthroscopy

## 2021-04-26 ENCOUNTER — APPOINTMENT (OUTPATIENT)
Dept: TRANSPLANT | Facility: CLINIC | Age: 71
End: 2021-04-26

## 2021-04-27 ENCOUNTER — NON-APPOINTMENT (OUTPATIENT)
Age: 71
End: 2021-04-27

## 2021-04-27 LAB
ALBUMIN SERPL ELPH-MCNC: 4.3 G/DL
ALP BLD-CCNC: 87 U/L
ALT SERPL-CCNC: 5 U/L
ANION GAP SERPL CALC-SCNC: 17 MMOL/L
AST SERPL-CCNC: 21 U/L
BASOPHILS # BLD AUTO: 0.04 K/UL
BASOPHILS NFR BLD AUTO: 0.5 %
BILIRUB SERPL-MCNC: 0.2 MG/DL
BUN SERPL-MCNC: 36 MG/DL
CALCIUM SERPL-MCNC: 10.4 MG/DL
CHLORIDE SERPL-SCNC: 106 MMOL/L
CO2 SERPL-SCNC: 16 MMOL/L
CREAT SERPL-MCNC: 3.47 MG/DL
EOSINOPHIL # BLD AUTO: 0.11 K/UL
EOSINOPHIL NFR BLD AUTO: 1.4 %
GLUCOSE SERPL-MCNC: 209 MG/DL
HCT VFR BLD CALC: 37.6 %
HGB BLD-MCNC: 11 G/DL
IMM GRANULOCYTES NFR BLD AUTO: 0.5 %
LYMPHOCYTES # BLD AUTO: 2.26 K/UL
LYMPHOCYTES NFR BLD AUTO: 28.4 %
MAN DIFF?: NORMAL
MCHC RBC-ENTMCNC: 29.1 PG
MCHC RBC-ENTMCNC: 29.3 GM/DL
MCV RBC AUTO: 99.5 FL
MONOCYTES # BLD AUTO: 0.56 K/UL
MONOCYTES NFR BLD AUTO: 7 %
NEUTROPHILS # BLD AUTO: 4.94 K/UL
NEUTROPHILS NFR BLD AUTO: 62.2 %
PLATELET # BLD AUTO: 167 K/UL
POTASSIUM SERPL-SCNC: 5.6 MMOL/L
PROT SERPL-MCNC: 6.8 G/DL
RBC # BLD: 3.78 M/UL
RBC # FLD: 15.2 %
SODIUM SERPL-SCNC: 139 MMOL/L
TACROLIMUS SERPL-MCNC: 8 NG/ML
WBC # FLD AUTO: 7.95 K/UL

## 2021-04-28 DIAGNOSIS — Z46.6 ENCOUNTER FOR FITTING AND ADJUSTMENT OF URINARY DEVICE: ICD-10-CM

## 2021-04-28 DIAGNOSIS — N13.5 CROSSING VESSEL AND STRICTURE OF URETER WITHOUT HYDRONEPHROSIS: ICD-10-CM

## 2021-04-28 DIAGNOSIS — N18.6 END STAGE RENAL DISEASE: ICD-10-CM

## 2021-05-11 ENCOUNTER — APPOINTMENT (OUTPATIENT)
Dept: TRANSPLANT | Facility: CLINIC | Age: 71
End: 2021-05-11

## 2021-05-12 LAB
ALBUMIN SERPL ELPH-MCNC: 4 G/DL
ALP BLD-CCNC: 81 U/L
ALT SERPL-CCNC: 13 U/L
ANION GAP SERPL CALC-SCNC: 13 MMOL/L
APPEARANCE: CLEAR
AST SERPL-CCNC: 20 U/L
BACTERIA: NEGATIVE
BASOPHILS # BLD AUTO: 0.04 K/UL
BASOPHILS NFR BLD AUTO: 0.6 %
BILIRUB SERPL-MCNC: 0.2 MG/DL
BILIRUBIN URINE: NEGATIVE
BLOOD URINE: ABNORMAL
BUN SERPL-MCNC: 40 MG/DL
CALCIUM SERPL-MCNC: 10.4 MG/DL
CALCIUM SERPL-MCNC: 10.4 MG/DL
CHLORIDE SERPL-SCNC: 105 MMOL/L
CO2 SERPL-SCNC: 21 MMOL/L
COLOR: NORMAL
CREAT SERPL-MCNC: 3.35 MG/DL
CREAT SPEC-SCNC: 90 MG/DL
CREAT/PROT UR: 0.3 RATIO
EOSINOPHIL # BLD AUTO: 0.12 K/UL
EOSINOPHIL NFR BLD AUTO: 1.7 %
GLUCOSE QUALITATIVE U: NEGATIVE
GLUCOSE SERPL-MCNC: 114 MG/DL
HCT VFR BLD CALC: 36.3 %
HGB BLD-MCNC: 11.1 G/DL
HYALINE CASTS: 1 /LPF
IMM GRANULOCYTES NFR BLD AUTO: 0.7 %
KETONES URINE: NEGATIVE
LDH SERPL-CCNC: 170 U/L
LEUKOCYTE ESTERASE URINE: ABNORMAL
LYMPHOCYTES # BLD AUTO: 2.78 K/UL
LYMPHOCYTES NFR BLD AUTO: 39.1 %
MAGNESIUM SERPL-MCNC: 1.6 MG/DL
MAN DIFF?: NORMAL
MCHC RBC-ENTMCNC: 29.3 PG
MCHC RBC-ENTMCNC: 30.6 GM/DL
MCV RBC AUTO: 95.8 FL
MICROSCOPIC-UA: NORMAL
MONOCYTES # BLD AUTO: 0.57 K/UL
MONOCYTES NFR BLD AUTO: 8 %
NEUTROPHILS # BLD AUTO: 3.55 K/UL
NEUTROPHILS NFR BLD AUTO: 49.9 %
NITRITE URINE: POSITIVE
PARATHYROID HORMONE INTACT: 46 PG/ML
PH URINE: 6
PHOSPHATE SERPL-MCNC: 3.5 MG/DL
PLATELET # BLD AUTO: 156 K/UL
POTASSIUM SERPL-SCNC: 5.3 MMOL/L
PROT SERPL-MCNC: 6.6 G/DL
PROT UR-MCNC: 24 MG/DL
PROTEIN URINE: NORMAL
RBC # BLD: 3.79 M/UL
RBC # FLD: 14.7 %
RED BLOOD CELLS URINE: 10 /HPF
SODIUM SERPL-SCNC: 139 MMOL/L
SPECIFIC GRAVITY URINE: 1.01
SQUAMOUS EPITHELIAL CELLS: 0 /HPF
TACROLIMUS SERPL-MCNC: 7.7 NG/ML
URATE SERPL-MCNC: 7.4 MG/DL
UROBILINOGEN URINE: NORMAL
WBC # FLD AUTO: 7.11 K/UL
WHITE BLOOD CELLS URINE: 37 /HPF

## 2021-05-14 LAB
BACTERIA UR CULT: ABNORMAL
CMV DNA SPEC QL NAA+PROBE: ABNORMAL IU/ML

## 2021-05-18 ENCOUNTER — APPOINTMENT (OUTPATIENT)
Dept: TRANSPLANT | Facility: CLINIC | Age: 71
End: 2021-05-18

## 2021-05-18 LAB
BKV DNA SPEC QL NAA+PROBE: NEGATIVE COPIES/ML
LOG 10 BK QUANTITATION PCR: NORMAL

## 2021-06-01 ENCOUNTER — INPATIENT (INPATIENT)
Facility: HOSPITAL | Age: 71
LOS: 7 days | Discharge: HOME | DRG: 699 | End: 2021-06-09
Attending: INTERNAL MEDICINE | Admitting: HOSPITALIST
Payer: MEDICARE

## 2021-06-01 VITALS
HEART RATE: 70 BPM | RESPIRATION RATE: 20 BRPM | DIASTOLIC BLOOD PRESSURE: 71 MMHG | SYSTOLIC BLOOD PRESSURE: 135 MMHG | HEIGHT: 70.5 IN | TEMPERATURE: 98 F | WEIGHT: 210.1 LBS | OXYGEN SATURATION: 99 %

## 2021-06-01 DIAGNOSIS — Z98.890 OTHER SPECIFIED POSTPROCEDURAL STATES: Chronic | ICD-10-CM

## 2021-06-01 DIAGNOSIS — Z90.5 ACQUIRED ABSENCE OF KIDNEY: Chronic | ICD-10-CM

## 2021-06-01 DIAGNOSIS — Z94.0 KIDNEY TRANSPLANT STATUS: Chronic | ICD-10-CM

## 2021-06-01 DIAGNOSIS — I77.0 ARTERIOVENOUS FISTULA, ACQUIRED: Chronic | ICD-10-CM

## 2021-06-01 LAB
ALBUMIN SERPL ELPH-MCNC: 4.3 G/DL — SIGNIFICANT CHANGE UP (ref 3.3–5)
ALP SERPL-CCNC: 76 U/L — SIGNIFICANT CHANGE UP (ref 40–120)
ALT FLD-CCNC: 12 U/L — SIGNIFICANT CHANGE UP (ref 10–45)
ANION GAP SERPL CALC-SCNC: 12 MMOL/L — SIGNIFICANT CHANGE UP (ref 5–17)
APPEARANCE UR: ABNORMAL
AST SERPL-CCNC: 18 U/L — SIGNIFICANT CHANGE UP (ref 10–40)
BACTERIA # UR AUTO: NEGATIVE — SIGNIFICANT CHANGE UP
BASE EXCESS BLDV CALC-SCNC: -2.4 MMOL/L — LOW (ref -2–2)
BASOPHILS # BLD AUTO: 0.02 K/UL — SIGNIFICANT CHANGE UP (ref 0–0.2)
BASOPHILS NFR BLD AUTO: 0.3 % — SIGNIFICANT CHANGE UP (ref 0–2)
BILIRUB SERPL-MCNC: 0.2 MG/DL — SIGNIFICANT CHANGE UP (ref 0.2–1.2)
BILIRUB UR-MCNC: NEGATIVE — SIGNIFICANT CHANGE UP
BUN SERPL-MCNC: 63 MG/DL — HIGH (ref 7–23)
CA-I SERPL-SCNC: 1.47 MMOL/L — HIGH (ref 1.12–1.3)
CALCIUM SERPL-MCNC: 11.3 MG/DL — HIGH (ref 8.4–10.5)
CHLORIDE BLDV-SCNC: 108 MMOL/L — SIGNIFICANT CHANGE UP (ref 96–108)
CHLORIDE SERPL-SCNC: 104 MMOL/L — SIGNIFICANT CHANGE UP (ref 96–108)
CO2 BLDV-SCNC: 26 MMOL/L — SIGNIFICANT CHANGE UP (ref 22–30)
CO2 SERPL-SCNC: 21 MMOL/L — LOW (ref 22–31)
COLOR SPEC: YELLOW — SIGNIFICANT CHANGE UP
CREAT SERPL-MCNC: 5.53 MG/DL — HIGH (ref 0.5–1.3)
DIFF PNL FLD: ABNORMAL
EOSINOPHIL # BLD AUTO: 0.12 K/UL — SIGNIFICANT CHANGE UP (ref 0–0.5)
EOSINOPHIL NFR BLD AUTO: 1.9 % — SIGNIFICANT CHANGE UP (ref 0–6)
EPI CELLS # UR: 1 /HPF — SIGNIFICANT CHANGE UP
GAS PNL BLDV: 135 MMOL/L — SIGNIFICANT CHANGE UP (ref 135–145)
GAS PNL BLDV: SIGNIFICANT CHANGE UP
GLUCOSE BLDV-MCNC: 88 MG/DL — SIGNIFICANT CHANGE UP (ref 70–99)
GLUCOSE SERPL-MCNC: 88 MG/DL — SIGNIFICANT CHANGE UP (ref 70–99)
GLUCOSE UR QL: NEGATIVE — SIGNIFICANT CHANGE UP
HCO3 BLDV-SCNC: 24 MMOL/L — SIGNIFICANT CHANGE UP (ref 21–29)
HCT VFR BLD CALC: 34 % — LOW (ref 39–50)
HCT VFR BLDA CALC: 36 % — LOW (ref 39–50)
HGB BLD CALC-MCNC: 11.6 G/DL — LOW (ref 13–17)
HGB BLD-MCNC: 10.8 G/DL — LOW (ref 13–17)
HYALINE CASTS # UR AUTO: 4 /LPF — SIGNIFICANT CHANGE UP (ref 0–7)
IMM GRANULOCYTES NFR BLD AUTO: 0.5 % — SIGNIFICANT CHANGE UP (ref 0–1.5)
KETONES UR-MCNC: NEGATIVE — SIGNIFICANT CHANGE UP
LACTATE BLDV-MCNC: 0.6 MMOL/L — LOW (ref 0.7–2)
LEUKOCYTE ESTERASE UR-ACNC: ABNORMAL
LYMPHOCYTES # BLD AUTO: 3.46 K/UL — HIGH (ref 1–3.3)
LYMPHOCYTES # BLD AUTO: 55 % — HIGH (ref 13–44)
MAGNESIUM SERPL-MCNC: 1.6 MG/DL — SIGNIFICANT CHANGE UP (ref 1.6–2.6)
MCHC RBC-ENTMCNC: 28.6 PG — SIGNIFICANT CHANGE UP (ref 27–34)
MCHC RBC-ENTMCNC: 31.8 GM/DL — LOW (ref 32–36)
MCV RBC AUTO: 90.2 FL — SIGNIFICANT CHANGE UP (ref 80–100)
MONOCYTES # BLD AUTO: 0.63 K/UL — SIGNIFICANT CHANGE UP (ref 0–0.9)
MONOCYTES NFR BLD AUTO: 10 % — SIGNIFICANT CHANGE UP (ref 2–14)
NEUTROPHILS # BLD AUTO: 2.03 K/UL — SIGNIFICANT CHANGE UP (ref 1.8–7.4)
NEUTROPHILS NFR BLD AUTO: 32.3 % — LOW (ref 43–77)
NITRITE UR-MCNC: POSITIVE
NRBC # BLD: 0 /100 WBCS — SIGNIFICANT CHANGE UP (ref 0–0)
OTHER CELLS CSF MANUAL: 3 ML/DL — LOW (ref 18–22)
PCO2 BLDV: 52 MMHG — HIGH (ref 35–50)
PH BLDV: 7.29 — LOW (ref 7.35–7.45)
PH UR: 6 — SIGNIFICANT CHANGE UP (ref 5–8)
PHOSPHATE SERPL-MCNC: 5.1 MG/DL — HIGH (ref 2.5–4.5)
PLATELET # BLD AUTO: 153 K/UL — SIGNIFICANT CHANGE UP (ref 150–400)
PO2 BLDV: <20 MMHG — LOW (ref 25–45)
POTASSIUM BLDV-SCNC: 5.6 MMOL/L — HIGH (ref 3.5–5.3)
POTASSIUM SERPL-MCNC: 5.7 MMOL/L — HIGH (ref 3.5–5.3)
POTASSIUM SERPL-SCNC: 5.7 MMOL/L — HIGH (ref 3.5–5.3)
PROT SERPL-MCNC: 7.6 G/DL — SIGNIFICANT CHANGE UP (ref 6–8.3)
PROT UR-MCNC: ABNORMAL
RBC # BLD: 3.77 M/UL — LOW (ref 4.2–5.8)
RBC # FLD: 14.6 % — HIGH (ref 10.3–14.5)
RBC CASTS # UR COMP ASSIST: 30 /HPF — HIGH (ref 0–4)
SAO2 % BLDV: 21 % — LOW (ref 67–88)
SODIUM SERPL-SCNC: 137 MMOL/L — SIGNIFICANT CHANGE UP (ref 135–145)
SP GR SPEC: 1.02 — SIGNIFICANT CHANGE UP (ref 1.01–1.02)
UROBILINOGEN FLD QL: NEGATIVE — SIGNIFICANT CHANGE UP
WBC # BLD: 6.29 K/UL — SIGNIFICANT CHANGE UP (ref 3.8–10.5)
WBC # FLD AUTO: 6.29 K/UL — SIGNIFICANT CHANGE UP (ref 3.8–10.5)
WBC UR QL: 163 /HPF — HIGH (ref 0–5)

## 2021-06-01 PROCEDURE — 76942 ECHO GUIDE FOR BIOPSY: CPT | Mod: 26

## 2021-06-01 PROCEDURE — 99285 EMERGENCY DEPT VISIT HI MDM: CPT

## 2021-06-01 PROCEDURE — 93010 ELECTROCARDIOGRAM REPORT: CPT

## 2021-06-01 RX ORDER — INSULIN HUMAN 100 [IU]/ML
5 INJECTION, SOLUTION SUBCUTANEOUS ONCE
Refills: 0 | Status: COMPLETED | OUTPATIENT
Start: 2021-06-01 | End: 2021-06-01

## 2021-06-01 RX ORDER — SODIUM CHLORIDE 9 MG/ML
1000 INJECTION, SOLUTION INTRAVENOUS ONCE
Refills: 0 | Status: COMPLETED | OUTPATIENT
Start: 2021-06-01 | End: 2021-06-01

## 2021-06-01 RX ORDER — SODIUM ZIRCONIUM CYCLOSILICATE 10 G/10G
10 POWDER, FOR SUSPENSION ORAL ONCE
Refills: 0 | Status: COMPLETED | OUTPATIENT
Start: 2021-06-01 | End: 2021-06-01

## 2021-06-01 RX ORDER — DEXTROSE 50 % IN WATER 50 %
50 SYRINGE (ML) INTRAVENOUS ONCE
Refills: 0 | Status: COMPLETED | OUTPATIENT
Start: 2021-06-01 | End: 2021-06-01

## 2021-06-01 RX ADMIN — INSULIN HUMAN 5 UNIT(S): 100 INJECTION, SOLUTION SUBCUTANEOUS at 23:13

## 2021-06-01 RX ADMIN — Medication 50 MILLILITER(S): at 23:12

## 2021-06-01 RX ADMIN — SODIUM CHLORIDE 1000 MILLILITER(S): 9 INJECTION, SOLUTION INTRAVENOUS at 23:12

## 2021-06-01 NOTE — ED PROVIDER NOTE - PROGRESS NOTE DETAILS
Calles DO: delay to labs 2/2 IV access, IV access obtained, hyperK , CHELA, likely 2/2 to diarrhea x 8 today, possible 2/2 recent UTI tx w/ cipro, transplant surgery to see pt. Calles DO: discussed w/ hospitalist, will rpt K+ after meds, pt stable, UTI noted, will start ceftriaxone, hospitalist request bcx prior to any abx as pt immunosuppressed. transplant team requesting ct a/p to rule out obstructive process, bedside bladder scan with less than 200 cc urine, pt has been voiding and this is not an accurate post void as pt had voided over an hour prior to eval w/ US

## 2021-06-01 NOTE — ED PROVIDER NOTE - NS ED ROS FT
Review of Systems:  · Constitutional: abnormal BW, no chills, no fever, no night sweats, no weight loss  · Nose: no epistaxis  · Mouth/Throat: no difficulty in swallowing, trachea midline, uvula midline  . Cardio: No CP, no palpitations, no chest pressure, no ripping chest pain  · Respiratory: no cough, no exertional dyspnea, no hemoptysis, no orthopnea, no shortness of breath  · Gastrointestinal: no abdominal pain, no diarrhea, no melena, no nausea, no vomiting  · Genitourinary: no difficulty urinating, no dysuria, no hematuria  · MUSCULOSKELETAL: FROM of all extremities  · Skin: no abrasion; no bruising; no laceration  · Neurological: no change in level of consciousness, no headache, no seizures  · ROS STATEMENT: all other ROS negative except as per HPI

## 2021-06-01 NOTE — ED PROVIDER NOTE - OBJECTIVE STATEMENT
70 year old male with pmhx of renal CA (kidney transplant), HTN DMT2 presents to the ED after being advised by his nephrologist to go to the ED after recent BW showed elevated K+, and Cr. patient reports feeling well denies any issues or complants and reports only coming bc he was advised to. patient denies chest pain, Shortness of Breath, abdominal pain, flank pain, Nausea/Vomiting/Diarrhea, dizziness, weakness, confusion, vision changes, urinary symptoms, syncope, falls, trauma, discharge, bleeding, fevers 70 year old male with pmhx of renal CA (kidney transplant), HTN DMT2 presents to the ED after being advised by his nephrologist to go to the ED after recent BW showed elevated K+, and Cr. patient reports feeling well denies any issues or complants and reports only coming bc he was advised to. patient denies chest pain, Shortness of Breath, abdominal pain, flank pain, Nausea/Vomiting/Diarrhea, dizziness, weakness, confusion, vision changes, urinary symptoms, syncope, falls, trauma, discharge, bleeding, fevers. Pt does note recent tx x 2 weeks w/ cipro for UTI. Notes he had 8 episodes of loose watery stools yesterday into today that have self resolved without abd pain/ vomiting.

## 2021-06-01 NOTE — ED ADULT NURSE NOTE - CAS TRG GEN SKIN COLOR
Please print prescan out of Media Tab and then complete and sign.     Once form is completed and signed, please fax to [phone].      Please direct any questions to [phone] Option 3.     Thanks,   Forms Completion Team.     Normal for race

## 2021-06-01 NOTE — ED ADULT NURSE NOTE - PSH
AV fistula  2013/ left forearm  H/O ileostomy  '    for 3 months. s/p Reversal  Kidney transplant recipient  2018  @ [facility] :  +  Hep C Donor  S/p nephrectomy  right 12/10/2015   benign  S/p nephrectomy  left 9/15/2015   + Cancer  S/P right knee arthroscopy

## 2021-06-01 NOTE — ED ADULT NURSE NOTE - OBJECTIVE STATEMENT
70y M arrived to the ED c/o elevated creatinine, potassium. Pt PMHx kidney transplant (2018) , DM. Pt presents to the ED sent by PCP for elevated creatinine. Pt reports having elevated creatinine a year ago, denies urinary symptoms at present. Fistula noted to L arm at present. PT denies chest pain, SOB, HA, N/V/D, abdominal pain, fever/chills, weakness, dizziness, numbness, tinging. Peripheral pulses present b/l. Skin warm, dry and pink. Pt placed in position of comfort. Pt educated on call bell system and provided call bell. Bed in lowest position, wheels locked, appropriate side rails raised. Pt denies needs at this time. 70y M arrived to the ED c/o elevated creatinine, potassium. Pt PMHx kidney transplant (2018) , DM. Pt presents to the ED sent by PCP for elevated creatinine. Pt reports having elevated creatinine a year ago. Fistula noted to L arm at present. Pt endorses having diarrhea for a few days and noticing darker urine.  Pt denies chest pain, SOB, HA, abdominal pain, fever/chills, weakness, dizziness, numbness, tinging. Peripheral pulses present b/l. Skin warm, dry and pink. Pt placed in position of comfort. Pt educated on call bell system and provided call bell. Bed in lowest position, wheels locked, appropriate side rails raised. Pt denies needs at this time.

## 2021-06-01 NOTE — ED ADULT NURSE NOTE - NSIMPLEMENTINTERV_GEN_ALL_ED
Implemented All Universal Safety Interventions:  [place] to call system. Call bell, personal items and telephone within reach. Instruct patient to call for assistance. Room bathroom lighting operational. Non-slip footwear when patient is off stretcher. Physically safe environment: no spills, clutter or unnecessary equipment. Stretcher in lowest position, wheels locked, appropriate side rails in place.

## 2021-06-01 NOTE — ED ADULT TRIAGE NOTE - CHIEF COMPLAINT QUOTE
Abnormal  Lab, elevated  creatinine, Hx  of  kidney  transplant  in  2008 Abnormal  Lab, elevated  creatinine and  potassium, Hx  of  kidney  transplant  in  2008

## 2021-06-01 NOTE — ED PROVIDER NOTE - ATTENDING CONTRIBUTION TO CARE
Calles DO: 70M renal transplant pt w/ hx ureteral stent in transplanted kidney, w/ hx frequent UTIs, p/w outpt labs showing hyperkalemia and martita, w/ recent c/o diarrhea loose watery multiple episodes in setting of recent outpt tx of UTI w/ cipro (2 week course). No abd pain, weakness, chest pain, sob, fatigue. Pt reports no decrease in urine output. On eval, well appearing, heart s1s2 no murmurs, lungs cta b/l, abd soft ntnd, no peripheral edema. Plan: repeat labs, recheck creatinine, consider dehydration due to recent gastroenteritis symptoms leading to UTI, will require admission, ekg here without hyperkalemia changes, will tx hyperkalemia when labs result. Calles DO: 70M renal transplant pt w/ hx ureteral stent in transplanted kidney, w/ hx frequent UTIs, p/w outpt labs showing hyperkalemia and martita, w/ recent c/o diarrhea loose watery multiple episodes in setting of recent outpt tx of UTI w/ cipro (2 week course). No abd pain, weakness, chest pain, sob, fatigue. Pt reports no decrease in urine output. On eval, well appearing, heart s1s2 no murmurs, lungs cta b/l, abd soft ntnd, no peripheral edema. Plan: repeat labs, recheck creatinine, consider dehydration due to recent gastroenteritis symptoms leading to UTI, eval for possible causes of martita, rule out obstruction, will require admission, ekg here without hyperkalemia changes, will tx hyperkalemia when labs result.

## 2021-06-01 NOTE — ED PROVIDER NOTE - PSH
AV fistula  2013/ left forearm  H/O ileostomy  '    for 3 months. s/p Reversal  Kidney transplant recipient  2018  @ [facility] :  +  Hep C Donor  S/p nephrectomy  right 12/10/2015   benign  S/p nephrectomy  left 9/15/2015   + Cancer  S/P right knee arthroscopy

## 2021-06-01 NOTE — ED CLERICAL - NS ED CLERK NOTE PRE-ARRIVAL INFORMATION; ADDITIONAL PRE-ARRIVAL INFORMATION
CC/Reason For referral: Renal Transplant 07/2018, Hyperkalemia   Preferred Consultant(if applicable):  Who admits for you (if needed):  Do you have documents you would like to fax over? No  Would you still like to speak to an ED attending? No

## 2021-06-02 DIAGNOSIS — E83.52 HYPERCALCEMIA: ICD-10-CM

## 2021-06-02 DIAGNOSIS — D84.9 IMMUNODEFICIENCY, UNSPECIFIED: ICD-10-CM

## 2021-06-02 DIAGNOSIS — E11.9 TYPE 2 DIABETES MELLITUS WITHOUT COMPLICATIONS: ICD-10-CM

## 2021-06-02 DIAGNOSIS — C61 MALIGNANT NEOPLASM OF PROSTATE: ICD-10-CM

## 2021-06-02 DIAGNOSIS — E87.5 HYPERKALEMIA: ICD-10-CM

## 2021-06-02 DIAGNOSIS — N17.9 ACUTE KIDNEY FAILURE, UNSPECIFIED: ICD-10-CM

## 2021-06-02 DIAGNOSIS — Z94.0 KIDNEY TRANSPLANT STATUS: ICD-10-CM

## 2021-06-02 DIAGNOSIS — I10 ESSENTIAL (PRIMARY) HYPERTENSION: ICD-10-CM

## 2021-06-02 LAB
ALBUMIN SERPL ELPH-MCNC: 4.2 G/DL — SIGNIFICANT CHANGE UP (ref 3.3–5)
ALP SERPL-CCNC: 87 U/L — SIGNIFICANT CHANGE UP (ref 40–120)
ALT FLD-CCNC: 14 U/L — SIGNIFICANT CHANGE UP (ref 10–45)
ANION GAP SERPL CALC-SCNC: 12 MMOL/L — SIGNIFICANT CHANGE UP (ref 5–17)
AST SERPL-CCNC: 22 U/L — SIGNIFICANT CHANGE UP (ref 10–40)
BILIRUB SERPL-MCNC: 0.2 MG/DL — SIGNIFICANT CHANGE UP (ref 0.2–1.2)
BUN SERPL-MCNC: 59 MG/DL — HIGH (ref 7–23)
BUN SERPL-MCNC: 60 MG/DL — HIGH (ref 7–23)
BUN SERPL-MCNC: 61 MG/DL — HIGH (ref 7–23)
CALCIUM SERPL-MCNC: 10.6 MG/DL — HIGH (ref 8.4–10.5)
CALCIUM SERPL-MCNC: 10.7 MG/DL — HIGH (ref 8.4–10.5)
CALCIUM SERPL-MCNC: 11 MG/DL — HIGH (ref 8.4–10.5)
CHLORIDE SERPL-SCNC: 105 MMOL/L — SIGNIFICANT CHANGE UP (ref 96–108)
CHLORIDE SERPL-SCNC: 106 MMOL/L — SIGNIFICANT CHANGE UP (ref 96–108)
CHLORIDE SERPL-SCNC: 106 MMOL/L — SIGNIFICANT CHANGE UP (ref 96–108)
CO2 SERPL-SCNC: 18 MMOL/L — LOW (ref 22–31)
CREAT ?TM UR-MCNC: 169 MG/DL — SIGNIFICANT CHANGE UP
CREAT ?TM UR-MCNC: 169 MG/DL — SIGNIFICANT CHANGE UP
CREAT SERPL-MCNC: 4.88 MG/DL — HIGH (ref 0.5–1.3)
CREAT SERPL-MCNC: 5.21 MG/DL — HIGH (ref 0.5–1.3)
CREAT SERPL-MCNC: 5.31 MG/DL — HIGH (ref 0.5–1.3)
FERRITIN SERPL-MCNC: 450 NG/ML — HIGH (ref 30–400)
GLUCOSE BLDC GLUCOMTR-MCNC: 100 MG/DL — HIGH (ref 70–99)
GLUCOSE BLDC GLUCOMTR-MCNC: 119 MG/DL — HIGH (ref 70–99)
GLUCOSE BLDC GLUCOMTR-MCNC: 138 MG/DL — HIGH (ref 70–99)
GLUCOSE BLDC GLUCOMTR-MCNC: 171 MG/DL — HIGH (ref 70–99)
GLUCOSE BLDC GLUCOMTR-MCNC: 199 MG/DL — HIGH (ref 70–99)
GLUCOSE BLDC GLUCOMTR-MCNC: 83 MG/DL — SIGNIFICANT CHANGE UP (ref 70–99)
GLUCOSE SERPL-MCNC: 160 MG/DL — HIGH (ref 70–99)
GLUCOSE SERPL-MCNC: 74 MG/DL — SIGNIFICANT CHANGE UP (ref 70–99)
GLUCOSE SERPL-MCNC: 84 MG/DL — SIGNIFICANT CHANGE UP (ref 70–99)
HCT VFR BLD CALC: 36.8 % — LOW (ref 39–50)
HGB BLD-MCNC: 11.5 G/DL — LOW (ref 13–17)
IRON SATN MFR SERPL: 27 % — SIGNIFICANT CHANGE UP (ref 16–55)
IRON SATN MFR SERPL: 75 UG/DL — SIGNIFICANT CHANGE UP (ref 45–165)
MCHC RBC-ENTMCNC: 28.6 PG — SIGNIFICANT CHANGE UP (ref 27–34)
MCHC RBC-ENTMCNC: 31.3 GM/DL — LOW (ref 32–36)
MCV RBC AUTO: 91.5 FL — SIGNIFICANT CHANGE UP (ref 80–100)
NRBC # BLD: 0 /100 WBCS — SIGNIFICANT CHANGE UP (ref 0–0)
PLATELET # BLD AUTO: 143 K/UL — LOW (ref 150–400)
POTASSIUM SERPL-MCNC: 4.9 MMOL/L — SIGNIFICANT CHANGE UP (ref 3.5–5.3)
POTASSIUM SERPL-MCNC: 5.5 MMOL/L — HIGH (ref 3.5–5.3)
POTASSIUM SERPL-MCNC: 6.1 MMOL/L — HIGH (ref 3.5–5.3)
POTASSIUM SERPL-SCNC: 4.9 MMOL/L — SIGNIFICANT CHANGE UP (ref 3.5–5.3)
POTASSIUM SERPL-SCNC: 5.5 MMOL/L — HIGH (ref 3.5–5.3)
POTASSIUM SERPL-SCNC: 6.1 MMOL/L — HIGH (ref 3.5–5.3)
PROT ?TM UR-MCNC: 95 MG/DL — HIGH (ref 0–12)
PROT SERPL-MCNC: 7.8 G/DL — SIGNIFICANT CHANGE UP (ref 6–8.3)
PROT/CREAT UR-RTO: 0.6 RATIO — HIGH (ref 0–0.2)
PTH-INTACT FLD-MCNC: 15 PG/ML — SIGNIFICANT CHANGE UP (ref 15–65)
RBC # BLD: 4.02 M/UL — LOW (ref 4.2–5.8)
RBC # FLD: 14.6 % — HIGH (ref 10.3–14.5)
SARS-COV-2 RNA SPEC QL NAA+PROBE: SIGNIFICANT CHANGE UP
SODIUM SERPL-SCNC: 135 MMOL/L — SIGNIFICANT CHANGE UP (ref 135–145)
SODIUM SERPL-SCNC: 136 MMOL/L — SIGNIFICANT CHANGE UP (ref 135–145)
SODIUM SERPL-SCNC: 136 MMOL/L — SIGNIFICANT CHANGE UP (ref 135–145)
SODIUM UR-SCNC: 65 MMOL/L — SIGNIFICANT CHANGE UP
TIBC SERPL-MCNC: 278 UG/DL — SIGNIFICANT CHANGE UP (ref 220–430)
UIBC SERPL-MCNC: 203 UG/DL — SIGNIFICANT CHANGE UP (ref 110–370)
URATE UR-MCNC: 12.4 MG/DL — SIGNIFICANT CHANGE UP
UUN UR-MCNC: 560 MG/DL — SIGNIFICANT CHANGE UP
WBC # BLD: 7.89 K/UL — SIGNIFICANT CHANGE UP (ref 3.8–10.5)
WBC # FLD AUTO: 7.89 K/UL — SIGNIFICANT CHANGE UP (ref 3.8–10.5)

## 2021-06-02 PROCEDURE — 76776 US EXAM K TRANSPL W/DOPPLER: CPT | Mod: 26,RT

## 2021-06-02 PROCEDURE — 51703 INSERT BLADDER CATH COMPLEX: CPT

## 2021-06-02 PROCEDURE — 99223 1ST HOSP IP/OBS HIGH 75: CPT

## 2021-06-02 PROCEDURE — 74176 CT ABD & PELVIS W/O CONTRAST: CPT | Mod: 26

## 2021-06-02 PROCEDURE — 99222 1ST HOSP IP/OBS MODERATE 55: CPT

## 2021-06-02 RX ORDER — CITRIC ACID/SODIUM CITRATE 300-500 MG
15 SOLUTION, ORAL ORAL THREE TIMES A DAY
Refills: 0 | Status: DISCONTINUED | OUTPATIENT
Start: 2021-06-02 | End: 2021-06-02

## 2021-06-02 RX ORDER — TAMSULOSIN HYDROCHLORIDE 0.4 MG/1
0.4 CAPSULE ORAL AT BEDTIME
Refills: 0 | Status: DISCONTINUED | OUTPATIENT
Start: 2021-06-02 | End: 2021-06-09

## 2021-06-02 RX ORDER — DEXTROSE 50 % IN WATER 50 %
25 SYRINGE (ML) INTRAVENOUS ONCE
Refills: 0 | Status: DISCONTINUED | OUTPATIENT
Start: 2021-06-02 | End: 2021-06-09

## 2021-06-02 RX ORDER — SODIUM CHLORIDE 9 MG/ML
1000 INJECTION, SOLUTION INTRAVENOUS
Refills: 0 | Status: DISCONTINUED | OUTPATIENT
Start: 2021-06-02 | End: 2021-06-04

## 2021-06-02 RX ORDER — METOPROLOL TARTRATE 50 MG
100 TABLET ORAL DAILY
Refills: 0 | Status: DISCONTINUED | OUTPATIENT
Start: 2021-06-02 | End: 2021-06-09

## 2021-06-02 RX ORDER — MYCOPHENOLATE MOFETIL 250 MG/1
500 CAPSULE ORAL
Refills: 0 | Status: DISCONTINUED | OUTPATIENT
Start: 2021-06-02 | End: 2021-06-07

## 2021-06-02 RX ORDER — ALLOPURINOL 300 MG
100 TABLET ORAL DAILY
Refills: 0 | Status: DISCONTINUED | OUTPATIENT
Start: 2021-06-02 | End: 2021-06-09

## 2021-06-02 RX ORDER — DEXTROSE 50 % IN WATER 50 %
15 SYRINGE (ML) INTRAVENOUS ONCE
Refills: 0 | Status: DISCONTINUED | OUTPATIENT
Start: 2021-06-02 | End: 2021-06-04

## 2021-06-02 RX ORDER — NIFEDIPINE 30 MG
60 TABLET, EXTENDED RELEASE 24 HR ORAL DAILY
Refills: 0 | Status: DISCONTINUED | OUTPATIENT
Start: 2021-06-02 | End: 2021-06-09

## 2021-06-02 RX ORDER — TACROLIMUS 5 MG/1
7 CAPSULE ORAL
Refills: 0 | Status: DISCONTINUED | OUTPATIENT
Start: 2021-06-02 | End: 2021-06-09

## 2021-06-02 RX ORDER — INSULIN LISPRO 100/ML
VIAL (ML) SUBCUTANEOUS
Refills: 0 | Status: DISCONTINUED | OUTPATIENT
Start: 2021-06-02 | End: 2021-06-09

## 2021-06-02 RX ORDER — SODIUM ZIRCONIUM CYCLOSILICATE 10 G/10G
10 POWDER, FOR SUSPENSION ORAL EVERY 8 HOURS
Refills: 0 | Status: COMPLETED | OUTPATIENT
Start: 2021-06-02 | End: 2021-06-03

## 2021-06-02 RX ORDER — CEFEPIME 1 G/1
2000 INJECTION, POWDER, FOR SOLUTION INTRAMUSCULAR; INTRAVENOUS DAILY
Refills: 0 | Status: DISCONTINUED | OUTPATIENT
Start: 2021-06-02 | End: 2021-06-02

## 2021-06-02 RX ORDER — MAGNESIUM OXIDE 400 MG ORAL TABLET 241.3 MG
400 TABLET ORAL DAILY
Refills: 0 | Status: DISCONTINUED | OUTPATIENT
Start: 2021-06-02 | End: 2021-06-09

## 2021-06-02 RX ORDER — INSULIN LISPRO 100/ML
VIAL (ML) SUBCUTANEOUS AT BEDTIME
Refills: 0 | Status: DISCONTINUED | OUTPATIENT
Start: 2021-06-02 | End: 2021-06-09

## 2021-06-02 RX ORDER — INSULIN HUMAN 100 [IU]/ML
5 INJECTION, SOLUTION SUBCUTANEOUS ONCE
Refills: 0 | Status: COMPLETED | OUTPATIENT
Start: 2021-06-02 | End: 2021-06-02

## 2021-06-02 RX ORDER — CEFTRIAXONE 500 MG/1
1000 INJECTION, POWDER, FOR SOLUTION INTRAMUSCULAR; INTRAVENOUS ONCE
Refills: 0 | Status: COMPLETED | OUTPATIENT
Start: 2021-06-02 | End: 2021-06-02

## 2021-06-02 RX ORDER — HEPARIN SODIUM 5000 [USP'U]/ML
5000 INJECTION INTRAVENOUS; SUBCUTANEOUS EVERY 8 HOURS
Refills: 0 | Status: DISCONTINUED | OUTPATIENT
Start: 2021-06-02 | End: 2021-06-09

## 2021-06-02 RX ORDER — TACROLIMUS 5 MG/1
1 CAPSULE ORAL
Qty: 0 | Refills: 0 | DISCHARGE

## 2021-06-02 RX ORDER — DEXTROSE 50 % IN WATER 50 %
12.5 SYRINGE (ML) INTRAVENOUS ONCE
Refills: 0 | Status: DISCONTINUED | OUTPATIENT
Start: 2021-06-02 | End: 2021-06-04

## 2021-06-02 RX ORDER — CALCIUM GLUCONATE 100 MG/ML
1 VIAL (ML) INTRAVENOUS ONCE
Refills: 0 | Status: COMPLETED | OUTPATIENT
Start: 2021-06-02 | End: 2021-06-02

## 2021-06-02 RX ORDER — DEXTROSE 50 % IN WATER 50 %
50 SYRINGE (ML) INTRAVENOUS ONCE
Refills: 0 | Status: COMPLETED | OUTPATIENT
Start: 2021-06-02 | End: 2021-06-02

## 2021-06-02 RX ORDER — PIPERACILLIN AND TAZOBACTAM 4; .5 G/20ML; G/20ML
3.38 INJECTION, POWDER, LYOPHILIZED, FOR SOLUTION INTRAVENOUS EVERY 12 HOURS
Refills: 0 | Status: DISCONTINUED | OUTPATIENT
Start: 2021-06-02 | End: 2021-06-04

## 2021-06-02 RX ORDER — CITRIC ACID/SODIUM CITRATE 300-500 MG
30 SOLUTION, ORAL ORAL
Refills: 0 | Status: DISCONTINUED | OUTPATIENT
Start: 2021-06-02 | End: 2021-06-09

## 2021-06-02 RX ORDER — SITAGLIPTIN 50 MG/1
0.5 TABLET, FILM COATED ORAL
Qty: 0 | Refills: 0 | DISCHARGE

## 2021-06-02 RX ORDER — GLUCAGON INJECTION, SOLUTION 0.5 MG/.1ML
1 INJECTION, SOLUTION SUBCUTANEOUS ONCE
Refills: 0 | Status: DISCONTINUED | OUTPATIENT
Start: 2021-06-02 | End: 2021-06-04

## 2021-06-02 RX ORDER — ERTAPENEM SODIUM 1 G/1
500 INJECTION, POWDER, LYOPHILIZED, FOR SOLUTION INTRAMUSCULAR; INTRAVENOUS EVERY 24 HOURS
Refills: 0 | Status: DISCONTINUED | OUTPATIENT
Start: 2021-06-02 | End: 2021-06-02

## 2021-06-02 RX ORDER — DEXTROSE 50 % IN WATER 50 %
25 SYRINGE (ML) INTRAVENOUS ONCE
Refills: 0 | Status: COMPLETED | OUTPATIENT
Start: 2021-06-02 | End: 2021-06-02

## 2021-06-02 RX ADMIN — Medication 15 MILLILITER(S): at 05:35

## 2021-06-02 RX ADMIN — HEPARIN SODIUM 5000 UNIT(S): 5000 INJECTION INTRAVENOUS; SUBCUTANEOUS at 05:33

## 2021-06-02 RX ADMIN — MYCOPHENOLATE MOFETIL 500 MILLIGRAM(S): 250 CAPSULE ORAL at 17:25

## 2021-06-02 RX ADMIN — MAGNESIUM OXIDE 400 MG ORAL TABLET 400 MILLIGRAM(S): 241.3 TABLET ORAL at 15:18

## 2021-06-02 RX ADMIN — HEPARIN SODIUM 5000 UNIT(S): 5000 INJECTION INTRAVENOUS; SUBCUTANEOUS at 22:36

## 2021-06-02 RX ADMIN — PIPERACILLIN AND TAZOBACTAM 25 GRAM(S): 4; .5 INJECTION, POWDER, LYOPHILIZED, FOR SOLUTION INTRAVENOUS at 22:35

## 2021-06-02 RX ADMIN — INSULIN HUMAN 5 UNIT(S): 100 INJECTION, SOLUTION SUBCUTANEOUS at 17:07

## 2021-06-02 RX ADMIN — SODIUM ZIRCONIUM CYCLOSILICATE 10 GRAM(S): 10 POWDER, FOR SUSPENSION ORAL at 11:55

## 2021-06-02 RX ADMIN — TAMSULOSIN HYDROCHLORIDE 0.4 MILLIGRAM(S): 0.4 CAPSULE ORAL at 22:36

## 2021-06-02 RX ADMIN — HEPARIN SODIUM 5000 UNIT(S): 5000 INJECTION INTRAVENOUS; SUBCUTANEOUS at 15:19

## 2021-06-02 RX ADMIN — TACROLIMUS 7 MILLIGRAM(S): 5 CAPSULE ORAL at 11:54

## 2021-06-02 RX ADMIN — Medication 1: at 17:54

## 2021-06-02 RX ADMIN — MYCOPHENOLATE MOFETIL 500 MILLIGRAM(S): 250 CAPSULE ORAL at 05:33

## 2021-06-02 RX ADMIN — Medication 1: at 14:16

## 2021-06-02 RX ADMIN — CEFTRIAXONE 100 MILLIGRAM(S): 500 INJECTION, POWDER, FOR SOLUTION INTRAMUSCULAR; INTRAVENOUS at 01:17

## 2021-06-02 RX ADMIN — Medication 5 MILLIGRAM(S): at 05:34

## 2021-06-02 RX ADMIN — Medication 100 MILLIGRAM(S): at 11:55

## 2021-06-02 RX ADMIN — Medication 100 GRAM(S): at 17:19

## 2021-06-02 RX ADMIN — Medication 1 TABLET(S): at 15:17

## 2021-06-02 RX ADMIN — Medication 50 MILLILITER(S): at 17:07

## 2021-06-02 RX ADMIN — SODIUM ZIRCONIUM CYCLOSILICATE 10 GRAM(S): 10 POWDER, FOR SUSPENSION ORAL at 19:00

## 2021-06-02 RX ADMIN — Medication 25 MILLILITER(S): at 02:27

## 2021-06-02 RX ADMIN — SODIUM ZIRCONIUM CYCLOSILICATE 10 GRAM(S): 10 POWDER, FOR SUSPENSION ORAL at 00:28

## 2021-06-02 RX ADMIN — Medication 30 MILLILITER(S): at 17:10

## 2021-06-02 RX ADMIN — SODIUM CHLORIDE 100 MILLILITER(S): 9 INJECTION, SOLUTION INTRAVENOUS at 15:54

## 2021-06-02 RX ADMIN — Medication 100 MILLIGRAM(S): at 05:34

## 2021-06-02 RX ADMIN — CEFEPIME 100 MILLIGRAM(S): 1 INJECTION, POWDER, FOR SOLUTION INTRAMUSCULAR; INTRAVENOUS at 11:54

## 2021-06-02 RX ADMIN — Medication 60 MILLIGRAM(S): at 05:34

## 2021-06-02 NOTE — CONSULT NOTE ADULT - ASSESSMENT
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 He was recently treated for two weeks with ciprofloxacin for pseudomonas urinary tract infection (known to be colonized). Patient denies dysuria or polyuria.    #Pyuria without bacteuria:  - Follow up UCx and BCx  -     #Renal transplant recipient:  - Renal transplant team following  - No evidence of a significant renal artery stenosis.  - Right iliac fossa transplant kidney with ureteral stent extending from the right renal pelvis into the bladder.  - Findings hydronephrosis and urothelial thickening of the renal transplant.  - Check CMV PCR viral load    #CHELA and hyperkalemia:  - Trend BMP    #Prostate cancer:  - On Orgovyx for prostate cancer. He is pending radiation therapy after finishing his oral therapy.       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 He was recently treated for two weeks with ciprofloxacin for pseudomonas urinary tract infection (known to be colonized). Patient denies dysuria or polyuria.    #Pyuria without bacteuria:  - Follow up UCx and BCx  - Would treat given his hx and radiologic findings  - Reviewed HIE with previous UCx, multiple strains of PsA, some of them are I to fred and cefepime  - Would start Zosyn 3.375g q12h tonight (pt already got cefepime today)    #Renal transplant recipient:  - Renal transplant team following, chronic meds per them  - No evidence of a significant renal artery stenosis.  - Right iliac fossa transplant kidney with ureteral stent extending from the right renal pelvis into the bladder.  - Findings hydronephrosis and urothelial thickening of the renal transplant.  - Check CMV PCR viral load    #CHELA and hyperkalemia:  - Trend BMP    #Prostate cancer:  - On Orgovyx for prostate cancer. He is pending radiation therapy after finishing his oral therapy.    Clemente Oleary MD, PGY4   ID fellow  Pager: [phone]  After 5pm/weekends call [phone]    d/w Dr. Hall  Recs conveyed to primary team Dr. Moore     70M with PMHx of right renal transplant (~2018) hx of dysfunction due to strictures and known hydronephrosis, bilateral nephrectomy (secondary to RCC), prostate cancer (on active treatment), HTN, T2DM, hx of CMV viremia, PsA bacteremia in 2020 (blood PsA not CRE but urine PsA is CRE), chronic diarrhea (multiple scopes without etiology), was sent in by doctor for CHELA and hyperkalemia on routine blood work. He was recently treated for two weeks with ciprofloxacin for pseudomonas urinary tract infection (known to be colonized). Patient denies dysuria or polyuria.    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  - Right iliac fossa transplant kidney with ureteral stent extending from the right renal pelvis into the bladder.  - Findings hydronephrosis and urothelial thickening of the renal transplant.  - Check CMV PCR viral load    #CHELA and hyperkalemia:  - Trend BMP    #Prostate cancer:  - On Orgovyx for prostate cancer. He is pending radiation therapy after finishing his oral therapy.    Clemente Oleary MD, PGY4   ID fellow  Pager: [phone]  After 5pm/weekends call [phone]    d/w Dr. Hall  Recs conveyed to primary team Dr. Moore

## 2021-06-02 NOTE — H&P ADULT - PROBLEM SELECTOR PLAN 3
Patient with grossly positive UA, but no bacteria. Recently treated with Cipro for suspected pseudomonas UTI. He has known to be colonized with pseudomonas. I reviewed sensitives and also sensitive to Cefepime.     -Start Cefepime for now  -Follow up blood and urine cultures  -Transplant ID consult

## 2021-06-02 NOTE — PATIENT PROFILE ADULT - VISION (WITH CORRECTIVE LENSES IF THE PATIENT USUALLY WEARS THEM):
Severely impaired: cannot locate objects without hearing or touching them or patient nonresponsive.
clear

## 2021-06-02 NOTE — CONSULT NOTE ADULT - ASSESSMENT
70 y.o male with a pmhx of DM, HTN, ESRD since July 2015 after bilateral nephrectomies for RCC s/p DDRT 7/2018 who presents for CHELA and hyperkalemia      #CHELA in setting of DDRT 7/2018  - Possibly in the setting of UTI, clinically pt asymptomatic but UA is grossly positive and he was tx'ed for pseudomonal UTI in April with Cipro  - Awaiting CT A/p  - Would change cefepime to Zosyn or Ertapenem based on the prior urine cultures sensitives  - F/u ucx and bcx from admission  - Give LR at 100cc/hr x 12hrs  - Renally dose medications to eGFR<10  - Lokelma 10g TID x 1 day for hyperkalemia  - Check BK & CMV PCR, will check a DSA      #DDRT  Pts aaron creatinine 1.9 but had CHELA with pyelonephritis. Has had multiple episodes of CHELA. Pt in process of relisting but on hold due to prostate cancer. Cell free DNA 0.2% in October 2020. Scr was 3.2-3.4mg/dl on last outpt visit in early may 2021. Tx'ed with cipro for pseudomonas UTI  - F/u CT A/P  - C/w Envarsus 7mg QD, check a tacro trough 30 mins prior to giving the dose. C/w cellcept 500mg BID, prednisone 5mg QD  - C/w bactrim PPX, 1 single strength tablet QD  - Unable to tolerate full dose cellcept due to leukopenia and diarrhea    #Immunosuppression  - Non-toxic appearing  - C/w Envarsus 7mg QD, check a tacro trough 30 mins prior to giving the dose. C/w cellcept 500mg BID, prednisone 5mg QD      #Hyperkalemia  - 2/2 CHELA  - lokelma 10g TID x 1 day  - Low K diet  - c/w bicitra, change to 30ml BID     70 y.o male with a pmhx of DM, HTN, ESRD since July 2015 after bilateral nephrectomies for RCC s/p DDRT 7/2018 who presents for CHELA and hyperkalemia      #CHELA in setting of DDRT 7/2018  - Possibly in the setting of UTI, clinically pt asymptomatic but UA is grossly positive and he was tx'ed for pseudomonal UTI in April with Cipro  - Awaiting CT A/p  - Would change cefepime to Zosyn or Ertapenem based on the prior urine cultures sensitives  - F/u ucx and bcx from admission  - Give NS at 100cc/hr x 12hrs  - Renally dose medications to eGFR<10  - Lokelma 10g TID x 1 day for hyperkalemia  - Check BK & CMV PCR, will check a DSA      #DDRT  Pts aaron creatinine 1.9 but had CHELA with pyelonephritis. Has had multiple episodes of CHELA. Pt in process of relisting but on hold due to prostate cancer. Cell free DNA 0.2% in October 2020. Scr was 3.2-3.4mg/dl on last outpt visit in early may 2021. Tx'ed with cipro for pseudomonas UTI  - F/u CT A/P  - C/w Envarsus 7mg QD, check a tacro trough 30 mins prior to giving the dose. C/w cellcept 500mg BID, prednisone 5mg QD  - C/w bactrim PPX, 1 single strength tablet QD  - Unable to tolerate full dose cellcept due to leukopenia and diarrhea    #Immunosuppression  - Non-toxic appearing  - C/w Envarsus 7mg QD, check a tacro trough 30 mins prior to giving the dose. C/w cellcept 500mg BID, prednisone 5mg QD      #Hyperkalemia  - 2/2 CHELA  - lokelma 10g TID x 1 day  - Low K diet  - c/w bicitra, change to 30ml BID

## 2021-06-02 NOTE — PROVIDER CONTACT NOTE (OTHER) - BACKGROUND
Patient admitted with UTI and Hyperkalemia. PMH: Kidney Neoplasm, BPH, DM2, ESRD, HTN . Kidney Transplant

## 2021-06-02 NOTE — PROGRESS NOTE ADULT - PROBLEM SELECTOR PLAN 5
Possibly due to renal dysfunction, but given prostate cancer need to consider bony mets as well  -F/U PTH    -  monitor level, currently seems asymptomatic

## 2021-06-02 NOTE — CONSULT NOTE ADULT - SUBJECTIVE AND OBJECTIVE BOX
Chief Complaint:  Patient is a 70y old  Male who presents with a chief complaint of hyperkalemia   DM (diabetes mellitus)  HTN (hypertension)  CKD (chronic kidney disease)  Bladder cancer  Kidney cancer, primary, with metastasis from kidney to other site  ESRD (end stage renal disease) on dialysis  Type 2 diabetes mellitus  Kidney transplant recipient  ESRD (end stage renal disease)  Kidney neoplasm  Hepatitis C  Benign prostatic hypertrophy  Anal fissure  Bowel obstruction  Medial meniscus tear  AV fistula  S/p nephrectomy  S/p nephrectomy  H/O ileostomy  S/P right knee arthroscopy  Kidney transplant recipient       HPI:      No Known Allergies            FAMILY HISTORY:        Review of Systems:    General:  No wt loss, fevers, chills, night sweats, fatigue  Eyes:  Good vision, no reported pain  ENT:  No sore throat, pain, runny nose, dysphagia  CV:  No pain, palpitations, no lightheadedness  Resp:  No dyspnea, cough, tachypnea, wheezing  GI: .  :  No pain, bleeding, incontinence, nocturia  Muscle:  No pain, weakness  Neuro:  No weakness, tingling, memory problems  Psych:  No fatigue, insomnia, mood problems, depression  Endocrine:  No polyuria, polydypsia, cold/heat intolerance  Heme:  No petechiae, ecchymosis, easy bruisability  Skin:  No rash, tattoos, scars, edema    Relevant Family History:   n/c    Relevant Social History: n/c      Physical Exam:    Vital Signs:  Vital Signs Last 24 Hrs  T(C): 36.6 (2021 19:33), Max: 36.8 (2021 17:57)  T(F): 97.9 (2021 19:33), Max: 98.2 (2021 17:57)  HR: 60 (2021 23:10) (60 - 70)  BP: 134/64 (2021 23:10) (134/64 - 153/78)  BP(mean): --  RR: 18 (2021 23:10) (18 - 20)  SpO2: 100% (2021 23:10) (99% - 100%)  Daily Height in cm: 179.07 (2021 17:57)    Daily     General:  Appears stated age, well-groomed, nad  HEENT:  NC/AT,  conjunctivae clear and pink, no thyromegaly, nodules, adenopathy, no JVD  Chest:  Full & symmetric excursion, no increased effort, breath sounds clear  Cardiovascular:  Regular rhythm, S1, S2, no murmur/rub/S3/S4, no abdominal bruit, no edema  Abdomen:  Soft, non-tender, non-distended, normoactive bowel sounds,  no masses ,no hepatosplenomeagaly, no signs of chronic liver disease  Extremities:  no cyanosis,clubbing or edema  Skin:  No rash/erythema/ecchymoses/petechiae/wounds/abscess/warm/dry  Neuro/Psych:  A&O  , no asterixis, no tremor, no encephalopathy    Laboratory:                            10.8   6.29  )-----------( 153      ( 2021 22:01 )             34.0     06-    137  |  104  |  63<H>  ----------------------------<  88  5.7<H>   |  21<L>  |  5.53<H>    Ca    11.3<H>      2021 22:01  Phos  5.1     06-  Mg     1.6     06-    TPro  7.6  /  Alb  4.3  /  TBili  0.2  /  DBili  x   /  AST  18  /  ALT  12  /  AlkPhos  76  06-01    LIVER FUNCTIONS - ( 2021 22:01 )  Alb: 4.3 g/dL / Pro: 7.6 g/dL / ALK PHOS: 76 U/L / ALT: 12 U/L / AST: 18 U/L / GGT: x             Urinalysis Basic - ( 2021 23:37 )    Color: Yellow / Appearance: Turbid / S.018 / pH: x  Gluc: x / Ketone: Negative  / Bili: Negative / Urobili: Negative   Blood: x / Protein: 100 mg/dL / Nitrite: Positive   Leuk Esterase: Large / RBC: 30 /hpf /  /HPF   Sq Epi: x / Non Sq Epi: 1 /hpf / Bacteria: Negative        Imaging:             Chief Complaint:  Patient is a 70y old  Male who presents with a chief complaint of hyperkalemia   DM (diabetes mellitus)  HTN (hypertension)  CKD (chronic kidney disease)  Bladder cancer  Kidney cancer, primary, with metastasis from kidney to other site  ESRD (end stage renal disease) on dialysis  Type 2 diabetes mellitus  Kidney transplant recipient  ESRD (end stage renal disease)  Kidney neoplasm  Hepatitis C  Benign prostatic hypertrophy  Anal fissure  Bowel obstruction  Medial meniscus tear  AV fistula  S/p nephrectomy  S/p nephrectomy  H/O ileostomy  S/P right knee arthroscopy  Kidney transplant recipient      HPI: 70 year old male with PMHx of renal CA, s/p bl nephrectomy, Prostrate Ca, DM, HTN, S/P HCV DDRT 2018, post operative course was complicated by DGF and ureteral stricture with recent sent placement and removal of PCN on 21.  He recently completed a 14 day course of cipro for Pseudomonas UTI.  He was sent to the ED from the outpatient clinic for hyperkalemia w/ k of 5.7 and uptrending SCr of 5.53 (BL ~ 2.6).  He currently denies chest pain, Shortness of Breath, abdominal pain, flank pain, Nausea/Vomiting, dizziness, weakness, confusion, vision changes, urinary symptoms, syncope, falls, trauma, discharge, bleeding, fevers          No Known Allergies                  Review of Systems:    General:  No wt loss, fevers, chills, night sweats, fatigue  Eyes:  Good vision, no reported pain  ENT:  No sore throat, pain, runny nose, dysphagia  CV:  No pain, palpitations, no lightheadedness  Resp:  No dyspnea, cough, tachypnea, wheezing  GI: .+ Diarrhea, NO Abd pain, N/V, melena  :  No pain, bleeding, incontinence, nocturia  Muscle:  No pain, weakness  Neuro:  No weakness, tingling, memory problems  Psych:  No fatigue, insomnia, mood problems, depression  Endocrine:  No polyuria, polydypsia, cold/heat intolerance  Heme:  No petechiae, ecchymosis, easy bruisability  Skin:  No rash, tattoos, scars, edema    Relevant Family History:   n/c    Relevant Social History: n/c      Physical Exam:    Vital Signs:  Vital Signs Last 24 Hrs  T(C): 36.6 (2021 19:33), Max: 36.8 (2021 17:57)  T(F): 97.9 (2021 19:33), Max: 98.2 (2021 17:57)  HR: 60 (2021 23:10) (60 - 70)  BP: 134/64 (2021 23:10) (134/64 - 153/78)  BP(mean): --  RR: 18 (2021 23:10) (18 - 20)  SpO2: 100% (2021 23:10) (99% - 100%)  Daily Height in cm: 179.07 (2021 17:57)    Daily     General:  Appears stated age, well-groomed, nad  HEENT:  NC/AT,  conjunctivae clear and pink, no thyromegaly, nodules, adenopathy, no JVD  Chest:  Full & symmetric excursion, no increased effort, breath sounds clear  Cardiovascular:  Regular rhythm, S1, S2, no murmur/rub/S3/S4, no abdominal bruit, no edema  Abdomen:  Soft, non-tender, non-distended, normoactive bowel sounds,  no masses ,no hepatosplenomeagaly, no signs of chronic liver disease  Extremities:  no cyanosis,clubbing or edema  Skin:  No rash/erythema/ecchymoses/petechiae/wounds/abscess/warm/dry  Neuro/Psych:  A&O  , no asterixis, no tremor, no encephalopathy    Laboratory:                            10.8   6.29  )-----------( 153      ( 2021 22:01 )             34.0     06-01    137  |  104  |  63<H>  ----------------------------<  88  5.7<H>   |  21<L>  |  5.53<H>    Ca    11.3<H>      2021 22:01  Phos  5.1     06-  Mg     1.6     06-    TPro  7.6  /  Alb  4.3  /  TBili  0.2  /  DBili  x   /  AST  18  /  ALT  12  /  AlkPhos  76  06-01    LIVER FUNCTIONS - ( 2021 22:01 )  Alb: 4.3 g/dL / Pro: 7.6 g/dL / ALK PHOS: 76 U/L / ALT: 12 U/L / AST: 18 U/L / GGT: x             Urinalysis Basic - ( 2021 23:37 )    Color: Yellow / Appearance: Turbid / S.018 / pH: x  Gluc: x / Ketone: Negative  / Bili: Negative / Urobili: Negative   Blood: x / Protein: 100 mg/dL / Nitrite: Positive   Leuk Esterase: Large / RBC: 30 /hpf /  /HPF   Sq Epi: x / Non Sq Epi: 1 /hpf / Bacteria: Negative

## 2021-06-02 NOTE — PROCEDURE NOTE - ADDITIONAL PROCEDURE DETAILS
Called by primary team for difficult armenta placement in setting of urinary retention, PVR >400.   Using aseptic technique, 16F coude was placed easily. (+) return of clear yellow urine. 10cc of sterile water inserted into balloon. Patient tolerated procedure well.

## 2021-06-02 NOTE — H&P ADULT - PROBLEM SELECTOR PLAN 6
-Send PSA  -Patient told Orgovyx is non-formulary and would need to supply his own  -Pending outpatient follow up for radiation

## 2021-06-02 NOTE — CONSULT NOTE ADULT - SUBJECTIVE AND OBJECTIVE BOX
[facility] DIVISION OF KIDNEY DISEASES AND HYPERTENSION -- INITIAL CONSULT NOTE  --------------------------------------------------------------------------------  Luis F Grullon   Nephrology Fellow  Pager NS: [phone]/ [facility]: [phone]  (After 5 pm or on weekends please page the on-call fellow, can view the schedule on [employer]. Login is marimar zamudio, schedule under [facility] medicine, psych, derm.    HPI: Patient is a 70 y.o male with a pmhx of DM, HTN, ESRD since 2015 after bilateral nephrectomies for RCC s/p DDRT 2018 who presents for abnormal labs. Patient follows with Dr. Arian Bhakta from Transplant Nephrology. Currently being evaluated for relisting. Was seen in 2021 and his eGFR was about 22 with a Cr of 2.93. In may his Scr slowly increased to 3.2-3.4 range. Now on admission he was found to have a Cr of 5.2mg/dl with K of 5.7. Was tx’ed with cipro for a UTI on May 14th.  Of note he had several admissions post transplant. Early on he was readmitted for anemia and hematoma. Required 2 kidney biopsies which revealed few oxalate crystals and borderline rejection for which he was treated with solumedrol 375 and prednisone taper. He is on bicitra to reduce risk of oxalate stones. Also admitted for CHELA few times, UTI's and CMV. Pt was admitted for CHELA and hydronephrosis 2020. Had nephrostomy tube then NU tube placed, c/b UTI. Went for ureteral stricture dilation and replacement of NU tube on 8/3/20. For immunosuppression patient is on envarsus 7mg QD, cellcept 500mg BID, prednisone 5mg QD        PAST HISTORY  --------------------------------------------------------------------------------  PAST MEDICAL & SURGICAL HISTORY:  HTN (hypertension)    Type 2 diabetes mellitus  dx: &#x27;88    ESRD (end stage renal disease)  On Dialysis &#x27;  to 2018    Kidney neoplasm  dx: &#x27; 2015 : Left      s/p bilateral Nephrectomy &#x27; 2015    Hepatitis C  In Donor Kidney: patient received treatment for Hep. 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 s/p Reversal    S/P right knee arthroscopy  &#x27; 90&#x27;s    Kidney transplant recipient  2018  @ [facility] :  +  Hep C Donor      FAMILY HISTORY:  No pertinent family history in first degree relatives      PAST SOCIAL HISTORY:    ALLERGIES & MEDICATIONS  --------------------------------------------------------------------------------  Allergies    No Known Allergies    Intolerances      Standing Inpatient Medications  allopurinol 100 milliGRAM(s) Oral daily  cefepime   IVPB 2000 milliGRAM(s) IV Intermittent daily  citric acid/sodium citrate Solution 15 milliLiter(s) Oral three times a day  dextrose 40% Gel 15 Gram(s) Oral once  dextrose 5%. 1000 milliLiter(s) IV Continuous <Continuous>  dextrose 5%. 1000 milliLiter(s) IV Continuous <Continuous>  dextrose 50% Injectable 25 Gram(s) IV Push once  dextrose 50% Injectable 25 Gram(s) IV Push once  dextrose 50% Injectable 12.5 Gram(s) IV Push once  glucagon  Injectable 1 milliGRAM(s) IntraMuscular once  heparin   Injectable 5000 Unit(s) SubCutaneous every 8 hours  insulin lispro (ADMELOG) corrective regimen sliding scale   SubCutaneous three times a day before meals  insulin lispro (ADMELOG) corrective regimen sliding scale   SubCutaneous at bedtime  magnesium oxide 400 milliGRAM(s) Oral daily  metoprolol succinate  milliGRAM(s) Oral daily  mycophenolate mofetil 500 milliGRAM(s) Oral two times a day  NIFEdipine XL 60 milliGRAM(s) Oral daily  predniSONE   Tablet 5 milliGRAM(s) Oral daily  sodium zirconium cyclosilicate 10 Gram(s) Oral every 8 hours  tacrolimus ER Tablet (ENVARSUS XR) 7 milliGRAM(s) Oral <User Schedule>  tamsulosin 0.4 milliGRAM(s) Oral at bedtime  trimethoprim   80 mG/sulfamethoxazole 400 mG 1 Tablet(s) Oral daily    PRN Inpatient Medications      REVIEW OF SYSTEMS  --------------------------------------------------------------------------------  Gen: No fevers/chills  Skin: No rashes  Head/Eyes/Ears: Normal hearing, no difficulty seeing  Respiratory: No dyspnea, cough  CV: No chest pain  GI: No abdominal pain, diarrhea  : No dysuria, hematuria  MSK: No  edema  Heme: No easy bruising or bleeding  Psych: No significant depression.    VITALS/PHYSICAL EXAM  --------------------------------------------------------------------------------  T(C): 36.5 (21 @ 05:31), Max: 36.8 (21 @ 17:57)  HR: 68 (21 @ 05:31) (60 - 70)  BP: 156/84 (21 @ 05:31) (128/69 - 156/84)  RR: 18 (21 @ 05:31) (18 - 20)  SpO2: 100% (21 @ 05:31) (99% - 100%)  Wt(kg): --  Height (cm): 179.1 (21 @ 17:57)  Weight (kg): 95.3 (21 @ 17:57)  BMI (kg/m2): 29.7 (21 @ 17:57)  BSA (m2): 2.14 (21 @ 17:57)      Physical Exam:    	Gen: NAD  	HEENT: Anicteric  	Pulm: CTA B/L  	CV: S1S2  	Abd: Soft, +BS   	MSK: No LE edema B/L  	Neuro: Awake  	Skin: Warm and dry  	Vascular access:      LABS/STUDIES  --------------------------------------------------------------------------------              10.8   6.29  >-----------<  153      [21 @ 22:01]              34.0     136  |  106  |  60  ----------------------------<  84      [21 @ 06:27]  5.5   |  18  |  5.21        Ca     10.7     [21 @ 06:27]      Mg     1.6     [21 @ 22:01]      Phos  5.1     [21 @ 22:01]    TPro  7.6  /  Alb  4.3  /  TBili  0.2  /  DBili  x   /  AST  18  /  ALT  12  /  AlkPhos  76  [21 @ 22:01]          Creatinine Trend:  SCr 5.21 [:27]  SCr 5.31 [:23]  SCr 5.53 [ 22:01]    Urinalysis - [21 @ 23:37]      Color Yellow / Appearance Turbid / SG 1.018 / pH 6.0      Gluc Negative / Ketone Negative  / Bili Negative / Urobili Negative       Blood Large / Protein 100 mg/dL / Leuk Est Large / Nitrite Positive      RBC 30 /  / Hyaline 4 / Gran  / Sq Epi  / Non Sq Epi 1 / Bacteria Negative    Urine Creatinine 169      [21 @ 06:38]  Urine Protein 95      [21 @ 06:38]  Urine Sodium 65      [21 @ 06:38]    HbA1c 6.0      [19 @ 23:48]    HBsAb <3.0      [19 @ 22:44]  HBsAg Nonreact      [19 @ 22:44]  HBcAb Nonreact      [19 @ 22:44]  HCV 0.07, Nonreact      [19 @ 22:44]  HIV Nonreact      [08-15-18 @ 14:37]     [facility] DIVISION OF KIDNEY DISEASES AND HYPERTENSION -- INITIAL CONSULT NOTE  --------------------------------------------------------------------------------  Luis F Grullon   Nephrology Fellow  Pager NS: [phone]/ [facility]: [phone]  (After 5 pm or on weekends please page the on-call fellow, can view the schedule on [employer]. Login is marimar zamudio, schedule under [facility] medicine, psych, derm.    HPI: Patient is a 70 y.o male with a pmhx of DM, HTN, ESRD since 2015 after bilateral nephrectomies for RCC s/p DDRT 2018 who presents for abnormal labs. Patient follows with Dr. Arian Bhakta from Transplant Nephrology. Currently being evaluated for relisting. Was seen in 2021 and his eGFR was about 22 with a Cr of 2.93. In may his Scr slowly increased to 3.2-3.4 range. Now on admission he was found to have a Cr of 5.2mg/dl with K of 5.7. Was tx’ed with cipro for a UTI on May 14th.  Of note he had several admissions post transplant. Early on he was readmitted for anemia and hematoma. Required 2 kidney biopsies which revealed few oxalate crystals and borderline rejection for which he was treated with solumedrol 375 and prednisone taper. He is on bicitra to reduce risk of oxalate stones. Also admitted for CHLEA few times, UTI's and CMV. Pt was admitted for CHELA and hydronephrosis 2020. Had nephrostomy tube then NU tube placed, c/b UTI. Went for ureteral stricture dilation and replacement of Nephrostomy tube on 8/3/20. Nephrostomy tube was removed and ureteral stent placed in 2021. For immunosuppression patient is on envarsus 7mg QD, cellcept 500mg BID, prednisone 5mg QD        PAST HISTORY  --------------------------------------------------------------------------------  PAST MEDICAL & SURGICAL HISTORY:  HTN (hypertension)    Type 2 diabetes mellitus  dx: &#x27;88    ESRD (end stage renal disease)  On Dialysis &#x27;  to 2018    Kidney neoplasm  dx: &#x27; 2015 : Left      s/p bilateral Nephrectomy &#x27; 2015    Hepatitis C  In Donor Kidney: patient received treatment for Hep. 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 s/p Reversal    S/P right knee arthroscopy  &#x27; 90&#x27;s    Kidney transplant recipient  2018  @ [facility] :  +  Hep C Donor      FAMILY HISTORY:  No pertinent family history in first degree relatives      PAST SOCIAL HISTORY:    ALLERGIES & MEDICATIONS  --------------------------------------------------------------------------------  Allergies    No Known Allergies    Intolerances      Standing Inpatient Medications  allopurinol 100 milliGRAM(s) Oral daily  cefepime   IVPB 2000 milliGRAM(s) IV Intermittent daily  citric acid/sodium citrate Solution 15 milliLiter(s) Oral three times a day  dextrose 40% Gel 15 Gram(s) Oral once  dextrose 5%. 1000 milliLiter(s) IV Continuous <Continuous>  dextrose 5%. 1000 milliLiter(s) IV Continuous <Continuous>  dextrose 50% Injectable 25 Gram(s) IV Push once  dextrose 50% Injectable 25 Gram(s) IV Push once  dextrose 50% Injectable 12.5 Gram(s) IV Push once  glucagon  Injectable 1 milliGRAM(s) IntraMuscular once  heparin   Injectable 5000 Unit(s) SubCutaneous every 8 hours  insulin lispro (ADMELOG) corrective regimen sliding scale   SubCutaneous three times a day before meals  insulin lispro (ADMELOG) corrective regimen sliding scale   SubCutaneous at bedtime  magnesium oxide 400 milliGRAM(s) Oral daily  metoprolol succinate  milliGRAM(s) Oral daily  mycophenolate mofetil 500 milliGRAM(s) Oral two times a day  NIFEdipine XL 60 milliGRAM(s) Oral daily  predniSONE   Tablet 5 milliGRAM(s) Oral daily  sodium zirconium cyclosilicate 10 Gram(s) Oral every 8 hours  tacrolimus ER Tablet (ENVARSUS XR) 7 milliGRAM(s) Oral <User Schedule>  tamsulosin 0.4 milliGRAM(s) Oral at bedtime  trimethoprim   80 mG/sulfamethoxazole 400 mG 1 Tablet(s) Oral daily    PRN Inpatient Medications      REVIEW OF SYSTEMS  --------------------------------------------------------------------------------  Gen: No fevers/chills  Skin: No rashes  Head/Eyes/Ears: Normal hearing, no difficulty seeing  Respiratory: No dyspnea, cough  CV: No chest pain  GI: No abdominal pain, diarrhea  : No dysuria, hematuria  MSK: No  edema  Heme: No easy bruising or bleeding  Psych: No significant depression.    VITALS/PHYSICAL EXAM  --------------------------------------------------------------------------------  T(C): 36.5 (21 @ 05:31), Max: 36.8 (21 @ 17:57)  HR: 68 (21 @ 05:31) (60 - 70)  BP: 156/84 (21 @ 05:31) (128/69 - 156/84)  RR: 18 (21 @ 05:31) (18 - 20)  SpO2: 100% (21 @ 05:31) (99% - 100%)  Wt(kg): --  Height (cm): 179.1 (21 @ 17:57)  Weight (kg): 95.3 (21 @ 17:57)  BMI (kg/m2): 29.7 (21 @ 17:57)  BSA (m2): 2.14 (21 @ 17:57)      Physical Exam:    	Gen: NAD  	HEENT: Anicteric  	Pulm: CTA B/L  	CV: S1S2  	Abd: Soft, +BS   	MSK: No LE edema B/L  	Neuro: Awake  	Skin: Warm and dry  	Vascular access:      LABS/STUDIES  --------------------------------------------------------------------------------              10.8   6.29  >-----------<  153      [21 @ 22:01]              34.0     136  |  106  |  60  ----------------------------<  84      [21 @ 06:27]  5.5   |  18  |  5.21        Ca     10.7     [21:27]      Mg     1.6     [21 @ 22:01]      Phos  5.1     [21 @ 22:01]    TPro  7.6  /  Alb  4.3  /  TBili  0.2  /  DBili  x   /  AST  18  /  ALT  12  /  AlkPhos  76  [21 @ 22:01]          Creatinine Trend:  SCr 5.21 [:27]  SCr 5.31 [ 01:23]  SCr 5.53 [ 22:01]    Urinalysis - [21 @ 23:37]      Color Yellow / Appearance Turbid / SG 1.018 / pH 6.0      Gluc Negative / Ketone Negative  / Bili Negative / Urobili Negative       Blood Large / Protein 100 mg/dL / Leuk Est Large / Nitrite Positive      RBC 30 /  / Hyaline 4 / Gran  / Sq Epi  / Non Sq Epi 1 / Bacteria Negative    Urine Creatinine 169      [21 @ 06:38]  Urine Protein 95      [21 @ 06:38]  Urine Sodium 65      [21 @ 06:38]    HbA1c 6.0      [19 @ 23:48]    HBsAb <3.0      [19 @ 22:44]  HBsAg Nonreact      [19 @ 22:44]  HBcAb Nonreact      [19 @ 22:44]  HCV 0.07, Nonreact      [19 @ 22:44]  HIV Nonreact      [08-15-18 @ 14:37]     [facility] DIVISION OF KIDNEY DISEASES AND HYPERTENSION -- INITIAL CONSULT NOTE  --------------------------------------------------------------------------------  Luis F Grullon   Nephrology Fellow  Pager NS: [phone]/ [facility]: [phone]  (After 5 pm or on weekends please page the on-call fellow, can view the schedule on [employer]. Login is marimar zamudio, schedule under [facility] medicine, psych, derm.    HPI: Patient is a 70 y.o male with a pmhx of DM, HTN, ESRD since 2015 after bilateral nephrectomies for RCC s/p DDRT 2018 who presents for abnormal labs. Patient follows with Dr. Arian Bhakta from Transplant Nephrology. Currently being evaluated for relisting. Was seen in 2021 and his eGFR was about 22 with a Cr of 2.93. In may his Scr slowly increased to 3.2-3.4 range. Now on admission he was found to have a Cr of 5.2mg/dl with K of 5.7. Was tx’ed with cipro for a UTI on May 14th.  Of note he had several admissions post transplant. Early on he was readmitted for anemia and hematoma. Required 2 kidney biopsies which revealed few oxalate crystals and borderline rejection for which he was treated with solumedrol 375 and prednisone taper. He is on bicitra to reduce risk of oxalate stones. Also admitted for CHELA few times, UTI's and CMV. Pt was admitted for CHELA and hydronephrosis 2020. Had nephrostomy tube then NU tube placed, c/b UTI. Went for ureteral stricture dilation and replacement of Nephrostomy tube on 8/3/20. Nephrostomy tube was removed and ureteral stent placed in 2021. For immunosuppression patient is on envarsus 7mg QD, cellcept 500mg BID, prednisone 5mg QD. Pt endorsing diarrhea, no fever, chills, nausea, vomiting, CP, SOB, dysuria.        PAST HISTORY  --------------------------------------------------------------------------------  PAST MEDICAL & SURGICAL HISTORY:  HTN (hypertension)    Type 2 diabetes mellitus  dx: &#x27;88    ESRD (end stage renal disease)  On Dialysis &#x27;  to 2018    Kidney neoplasm  dx: &#x27; 2015 : Left      s/p bilateral Nephrectomy &#x27; 2015    Hepatitis C  In Donor Kidney: patient received treatment for Hep. 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 s/p Reversal    S/P right knee arthroscopy  &#x27; 90&#x27;s    Kidney transplant recipient  2018  @ [facility] :  +  Hep C Donor      FAMILY HISTORY:  No pertinent family history in first degree relatives      PAST SOCIAL HISTORY: No toxic habits    ALLERGIES & MEDICATIONS  --------------------------------------------------------------------------------  Allergies    No Known Allergies    Intolerances      Standing Inpatient Medications  allopurinol 100 milliGRAM(s) Oral daily  cefepime   IVPB 2000 milliGRAM(s) IV Intermittent daily  citric acid/sodium citrate Solution 15 milliLiter(s) Oral three times a day  dextrose 40% Gel 15 Gram(s) Oral once  dextrose 5%. 1000 milliLiter(s) IV Continuous <Continuous>  dextrose 5%. 1000 milliLiter(s) IV Continuous <Continuous>  dextrose 50% Injectable 25 Gram(s) IV Push once  dextrose 50% Injectable 25 Gram(s) IV Push once  dextrose 50% Injectable 12.5 Gram(s) IV Push once  glucagon  Injectable 1 milliGRAM(s) IntraMuscular once  heparin   Injectable 5000 Unit(s) SubCutaneous every 8 hours  insulin lispro (ADMELOG) corrective regimen sliding scale   SubCutaneous three times a day before meals  insulin lispro (ADMELOG) corrective regimen sliding scale   SubCutaneous at bedtime  magnesium oxide 400 milliGRAM(s) Oral daily  metoprolol succinate  milliGRAM(s) Oral daily  mycophenolate mofetil 500 milliGRAM(s) Oral two times a day  NIFEdipine XL 60 milliGRAM(s) Oral daily  predniSONE   Tablet 5 milliGRAM(s) Oral daily  sodium zirconium cyclosilicate 10 Gram(s) Oral every 8 hours  tacrolimus ER Tablet (ENVARSUS XR) 7 milliGRAM(s) Oral <User Schedule>  tamsulosin 0.4 milliGRAM(s) Oral at bedtime  trimethoprim   80 mG/sulfamethoxazole 400 mG 1 Tablet(s) Oral daily    PRN Inpatient Medications      REVIEW OF SYSTEMS  --------------------------------------------------------------------------------  Gen: No fevers/chills  Skin: No rashes  Head/Eyes/Ears: Normal hearing, no difficulty seeing  Respiratory: No dyspnea, cough  CV: No chest pain  GI: No abdominal pain, +diarrhea  : No dysuria, hematuria  MSK: No  edema    VITALS/PHYSICAL EXAM  --------------------------------------------------------------------------------  T(C): 36.5 (21 @ 05:31), Max: 36.8 (21 @ 17:57)  HR: 68 (21 @ 05:31) (60 - 70)  BP: 156/84 (21 @ 05:31) (128/69 - 156/84)  RR: 18 (21 @ 05:31) (18 - 20)  SpO2: 100% (21 @ 05:31) (99% - 100%)  Wt(kg): --  Height (cm): 179.1 (21 @ 17:57)  Weight (kg): 95.3 (21 @ 17:57)  BMI (kg/m2): 29.7 (21 @ 17:57)  BSA (m2): 2.14 (21 @ 17:57)      Physical Exam:    	Gen: NAD  	HEENT: Anicteric  	Pulm: CTA B/L  	CV: S1S2  	Abd: Soft, +BS   Allograft: No TTP  	MSK: No LE edema B/L  	Neuro: Awake  	Skin: Warm and dry  	Vascular access:      LABS/STUDIES  --------------------------------------------------------------------------------              10.8   6.29  >-----------<  153      [21 @ 22:01]              34.0     136  |  106  |  60  ----------------------------<  84      [21 @ 06:27]  5.5   |  18  |  5.21        Ca     10.7     [21 @ 06:27]      Mg     1.6     [21 @ 22:01]      Phos  5.1     [21 @ 22:01]    TPro  7.6  /  Alb  4.3  /  TBili  0.2  /  DBili  x   /  AST  18  /  ALT  12  /  AlkPhos  76  [21 @ 22:01]          Creatinine Trend:  SCr 5.21 [ 06:27]  SCr 5.31 [:23]  SCr 5.53 [ 22:01]    Urinalysis - [06-01-21 @ 23:37]      Color Yellow / Appearance Turbid / SG 1.018 / pH 6.0      Gluc Negative / Ketone Negative  / Bili Negative / Urobili Negative       Blood Large / Protein 100 mg/dL / Leuk Est Large / Nitrite Positive      RBC 30 /  / Hyaline 4 / Gran  / Sq Epi  / Non Sq Epi 1 / Bacteria Negative    Urine Creatinine 169      [21 @ 06:38]  Urine Protein 95      [21 @ 06:38]  Urine Sodium 65      [21 @ 06:38]    HbA1c 6.0      [19 @ 23:48]    HBsAb <3.0      [19 @ 22:44]  HBsAg Nonreact      [19 @ 22:44]  HBcAb Nonreact      [19 @ 22:44]  HCV 0.07, Nonreact      [19 @ 22:44]  HIV Nonreact      [08-15-18 @ 14:37]

## 2021-06-02 NOTE — H&P ADULT - HISTORY OF PRESENT ILLNESS
70M with PMHx of right renal transplant (~2018), bilateral nephrectomy (secondary to RCC), prostate cancer (on active treatment), HTN, and T2DM sent in by physician for worsening renal function and hyperkalemia. Patient states he was having routine blood work with his physician for his prior history of RCC and found to have worsening renal function and hyperkalemia; thus, patient sent in. He was recently treated for two weeks with ciprofloxacin for pseudomonas urinary tract infection (known to be colonized). Patient denies dysuria or polyuria. He has chronic diarrhea. He states he goes approximately eight times per day for many years. He describes it as non-bloody. He states he's been to several gastroenterologists with colonoscopies and etiology has not been found. He denies fever or chills.    Of note patient does not tolerate high CellCept doses secondary to diarrhea. 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 He has known transplanted kidney hydronephrosis. He  ad prior history of pseudomonas bacteremia. He has prior history of CMV viremia. In the ED patient had blood and urine cultures drawn. He was given 1 g CTX, 1 L LR, D50, Lokelma and insulin. Patient in good spirits in the ED. He tells me he is taking Orgovyx for prostate cancer. He is pending radiation therapy after finishing his oral therapy. 70M with PMHx of right renal transplant (~2018), bilateral nephrectomy (secondary to RCC), prostate cancer (on active treatment), HTN, and T2DM sent in by physician for worsening renal function and hyperkalemia. Patient states he was having routine blood work with his physician for his prior history of RCC and found to have worsening renal function and hyperkalemia; thus, patient sent in. He was recently treated for two weeks with ciprofloxacin for pseudomonas urinary tract infection (known to be colonized). Patient denies dysuria or polyuria. He has chronic diarrhea. He states he goes approximately eight times per day for many years. He describes it as non-bloody. He states he's been to several gastroenterologists with colonoscopies and etiology has not been found. He denies fever or chills.    Of note patient does not tolerate high CellCept doses secondary to diarrhea. 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 He has known transplanted kidney hydronephrosis. He recent had stent placed by IR in April 2021 and right nephrostomy removed. He had prior history of pseudomonas bacteremia. He has prior history of CMV viremia. In the ED patient had blood and urine cultures drawn. He was given 1 g CTX, 1 L LR, D50, Lokelma and insulin. Patient in good spirits in the ED. He tells me he is taking Orgovyx for prostate cancer. He is pending radiation therapy after finishing his oral therapy.

## 2021-06-02 NOTE — CONSULT NOTE ADULT - ATTENDING COMMENTS
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    Ultrasound with bladder wall thickening and urothelial thickening  Favor empiric antibiotic coverage pending culture results (and possible biopsy data)  I would utilize Zosyn given prior Carbapenem resistant and intermediate pseudomonas urinary isolates  Intermittently isolates have also been Cefepime intermediate    I will continue to follow. Please feel free to contact me with any further questions.    Zackery Hall M.D.  [facility] Division of Infectious Disease  8AM-5PM: Pager Number [phone]  After Hours (or if no response): Please contact the Infectious Diseases Office at [phone]     The above assessment and plan were discussed with Dr Diana Moore

## 2021-06-02 NOTE — H&P ADULT - PROBLEM SELECTOR PLAN 5
Possibly due to renal dysfunction, but given prostate cancer need to consider bony mets as well    -Send PTH to see if it is appropriately addressed  -Will continue to monitor level, currently seems asymptomatic

## 2021-06-02 NOTE — H&P ADULT - PROBLEM SELECTOR PLAN 1
Doubt pre-renal secondary to diarrhea which is chronic. He did recently have stent placed with right nephrostomy removed. Possibly obstruction at this level? He also has prostate cancer; thus, unsure if it is responding so could have obstruction at prostate level as well. Doubt elevated creatinine & hyperkalemia related to Bactrim.    -Transplant Nephrology consult  -Send urine electrolytes & UPCR  -Follow up CT A&P w/o contrast  -Renal Transplant US  -May require biopsy pending clinical course  -Check post-residual void

## 2021-06-02 NOTE — CONSULT NOTE ADULT - ASSESSMENT
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  He recently completed a 14 day course of cipro for Pseudomonas UTI.  He was sent to the ED from the outpatient clinic for hyperkalemia w/ k of 5.7 and uptrending SCr of 5.53 (BL ~ 2.6).         plan: As discussed with Dr. Multani  -Admit to Medicine  -Shift K w/ insulin/d50 and loklema  -CBC,CMP,Mg/Phos,Coags,Tacro level  -Cont w/ home immuno meds  -Renal US/non con CT   -Possible renal biopsy

## 2021-06-02 NOTE — PROGRESS NOTE ADULT - PROBLEM SELECTOR PLAN 6
-F/U PSA  -Patient told Orgovyx is non-formulary and would need to supply his own  -Pending outpatient follow up for radiation  -may need urology for armenta if difficult and also for possible stent eval for hydro

## 2021-06-02 NOTE — H&P ADULT - NSHPREVIEWOFSYSTEMS_GEN_ALL_CORE
Gen: no night sweats or change in appetite  Eyes: no changes in vision or diplopia   ENT: no epistaxis, sinus pain, gingival bleeding, odynophagia or dysphagia  CV: no CP, PND or palpitations  Resp: no cough, wheezing, or hemoptysis  GI: no hematemesis, hematochezia, or melena; +chronic diarrhea  : no dysuria, polyuria, or hematuria  MSK: no arthralgias or joint swelling   Neuro: no gross sensory changes, numbness, focal deficits  Psych: no depression or changes in concentration  Heme/Onc: no purpura, petechiae or night sweats  Skin: no pruritus, hair loss or skin lesions  All: no photosensitivity, no complaints of anaphylaxis (SOB, throat swelling)

## 2021-06-02 NOTE — H&P ADULT - PROBLEM SELECTOR PLAN 4
-Continue with home Prednisone, CellCept, and Tacrolimus  -Check tacro level  -Continue with Mag Oxide  -Check CMV PCR  -Continue with Bactrim PPx  -Follow up with Transplant Surgery & Nephrology

## 2021-06-02 NOTE — CONSULT NOTE ADULT - SUBJECTIVE AND OBJECTIVE BOX
Patient is a 70y old  Male who presents with a chief complaint of Sent in for Hyperkalemia and worsening renal function (2021 13:28)    HPI:  70M with PMHx of right renal transplant (~2018), bilateral nephrectomy (secondary to RCC), prostate cancer (on active treatment), HTN, and T2DM sent in by physician for worsening renal function and hyperkalemia. Patient states he was having routine blood work with his physician for his prior history of RCC and found to have worsening renal function and hyperkalemia; thus, patient sent in. He was recently treated for two weeks with ciprofloxacin for pseudomonas urinary tract infection (known to be colonized). Patient denies dysuria or polyuria. He has chronic diarrhea. He states he goes approximately eight times per day for many years. He describes it as non-bloody. He states he's been to several gastroenterologists with colonoscopies and etiology has not been found. He denies fever or chills.    Of note patient does not tolerate high CellCept doses secondary to diarrhea. 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 He has known transplanted kidney hydronephrosis. He recent had stent placed by IR in 2021 and right nephrostomy removed. He had prior history of pseudomonas bacteremia. He has prior history of CMV viremia. In the ED patient had blood and urine cultures drawn. He was given 1 g CTX, 1 L LR, D50, Lokelma and insulin. Patient in good spirits in the ED. He tells me he is taking Orgovyx for prostate cancer. He is pending radiation therapy after finishing his oral therapy. (2021 04:58)    ID consulted for pyuria in a renal trasnplant pt.       REVIEW OF SYSTEMS  [  ] ROS unobtainable because:    [  ] All other systems negative except as noted below    Constitutional:  [ ] fever [ ] chills  [ ] weight loss  [ ]night sweat  [ ]poor appetite/PO intake [ ]fatigue   Skin:  [ ] rash [ ] phlebitis	  Eyes: [ ] icterus [ ] pain  [ ] discharge	  ENMT: [ ] sore throat  [ ] thrush [ ] ulcers [ ] exudates [ ]anosmia  Respiratory: [ ] dyspnea [ ] hemoptysis [ ] cough [ ] sputum	  Cardiovascular:  [ ] chest pain [ ] palpitations [ ] edema	  Gastrointestinal:  [ ] nausea [ ] vomiting [ ] diarrhea [ ] constipation [ ] pain	  Genitourinary:  [ ] dysuria [ ] frequency [ ] hematuria [ ] discharge [ ] flank pain  [ ] incontinence  Musculoskeletal:  [ ] myalgias [ ] arthralgias [ ] arthritis  [ ] back pain  Neurological:  [ ] headache [ ] weakness [ ] seizures  [ ] confusion/altered mental status    prior hospital charts reviewed [V]  primary team notes reviewed [V]  other consultant notes reviewed [V]    PAST MEDICAL & SURGICAL HISTORY:  HTN (hypertension)    Type 2 diabetes mellitus  dx: &#x27;88    ESRD (end stage renal disease)  On Dialysis &#x27;  to 2018    Kidney neoplasm  dx: &#x27;  : Left      s/p bilateral Nephrectomy &#x27;     Hepatitis C  In Donor Kidney: patient received treatment for Hep. 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 s/p Reversal    S/P right knee arthroscopy  &#x27; 90&#x27;s    Kidney transplant recipient  2018  @ [facility] :  +  Hep C Donor    SOCIAL HISTORY:  - Denied smoking/vaping/alcohol/recreational drug use    FAMILY HISTORY:  No pertinent family history in first degree relatives    Allergies  No Known Allergies    ANTIMICROBIALS:  ertapenem  IVPB 500 every 24 hours  trimethoprim   80 mG/sulfamethoxazole 400 mG 1 daily    ANTIMICROBIALS (past 90 days):  MEDICATIONS  (STANDING):  cefepime   IVPB   100 mL/Hr IV Intermittent (21 @ 11:54)    cefTRIAXone   IVPB   100 mL/Hr IV Intermittent (21 @ 01:17)      OTHER MEDS:   MEDICATIONS  (STANDING):  allopurinol 100 daily  dextrose 40% Gel 15 once  dextrose 50% Injectable 25 once  dextrose 50% Injectable 12.5 once  dextrose 50% Injectable 25 once  glucagon  Injectable 1 once  heparin   Injectable 5000 every 8 hours  insulin lispro (ADMELOG) corrective regimen sliding scale  three times a day before meals  insulin lispro (ADMELOG) corrective regimen sliding scale  at bedtime  metoprolol succinate  daily  mycophenolate mofetil 500 two times a day  NIFEdipine XL 60 daily  predniSONE   Tablet 5 daily  tacrolimus ER Tablet (ENVARSUS XR) 7 <User Schedule>  tamsulosin 0.4 at bedtime      VITALS:  Vital Signs Last 24 Hrs  T(F): 97.8 (21 @ 12:19), Max: 98.2 (21 @ 17:57)    Vital Signs Last 24 Hrs  HR: 67 (21 @ 12:19) (60 - 70)  BP: 117/67 (21 @ 12:19) (117/67 - 156/84)  RR: 18 (21 @ 12:19)  SpO2: 97% (21 @ 12:19) (97% - 100%)  Wt(kg): --    EXAM:  GA: NAD  HEENT: oral cavity no lesion  CV: nl S1/S2, no RMG  Lungs: CTAB  Abd: BS+, soft, nontender, no rebounding pain  Ext: no edema  Skin:  IV: no phlebitis    Labs:                        11.5   7.89  )-----------( 143      ( 2021 14:22 )             36.8         136  |  106  |  59<H>  ----------------------------<  160<H>  6.1<H>   |  18<L>  |  4.88<H>    Ca    10.6<H>      2021 14:23  Phos  5.1       Mg     1.6         TPro  7.8  /  Alb  4.2  /  TBili  0.2  /  DBili  x   /  AST  22  /  ALT  14  /  AlkPhos  87        WBC Trend:  WBC Count: 7.89 (21 @ 14:22)  WBC Count: 6.29 (21 @ 22:01)    Auto Neutrophil #: 2.03 K/uL (21 @ 22:01)  Auto Neutrophil #: 1.31 K/uL (20 @ 06:07)  Auto Neutrophil #: 5.35 K/uL (20 @ 13:36)  Band Neutrophils %: 1.7 % (20 @ 13:36)    Creatine Trend:  Creatinine, Serum: 4.88 ()  Creatinine, Serum: 5.21 ()  Creatinine, Serum: 5.31 ()  Creatinine, Serum: 5.53 ()    Liver Biochemical Testing Trend:  Alanine Aminotransferase (ALT/SGPT): 14 ()  Alanine Aminotransferase (ALT/SGPT): 12 ()  Alanine Aminotransferase (ALT/SGPT): 30 (07-10)  Alanine Aminotransferase (ALT/SGPT): 32 ()  Alanine Aminotransferase (ALT/SGPT): 34 ()  Aspartate Aminotransferase (AST/SGOT): 22 (21 @ 14:23)  Aspartate Aminotransferase (AST/SGOT): 18 (21 @ 22:01)  Aspartate Aminotransferase (AST/SGOT): 29 (07-10-20 @ 05:56)  Aspartate Aminotransferase (AST/SGOT): 29 (20 @ 06:07)  Aspartate Aminotransferase (AST/SGOT): 38 (20 @ 06:07)  Bilirubin Total, Serum: 0.2 ()  Bilirubin Total, Serum: 0.2 ()  Bilirubin Total, Serum: 0.2 (07-10)  Bilirubin Total, Serum: 0.2 ()  Bilirubin Total, Serum: 0.2 ()    Urinalysis Basic - ( 2021 23:37 )  Color: Yellow / Appearance: Turbid / S.018 / pH: x  Gluc: x / Ketone: Negative  / Bili: Negative / Urobili: Negative   Blood: x / Protein: 100 mg/dL / Nitrite: Positive   Leuk Esterase: Large / RBC: 30 /hpf /  /HPF   Sq Epi: x / Non Sq Epi: 1 /hpf / Bacteria: Negative        MICROBIOLOGY:    Culture - Blood (collected 2020 09:01)  Source: .Blood Blood-Peripheral  Final Report:    No Growth Final    Culture - Blood (collected 2020 14:50)  Source: .Blood Blood-Peripheral  Final Report:    No Growth Final    Culture - Urine (collected 2020 19:39)  Source: .Urine Clean Catch (Midstream)  Final Report:    10,000 - 49,000 CFU/mL Pseudomonas aeruginosa (Carbapenem Resistant)  Organism: Pseudomonas aeruginosa (Carbapenem Resistant)  Organism: Pseudomonas aeruginosa (Carbapenem Resistant)    Sensitivities:      -  Amikacin: S <=16      -  Aztreonam: R >16      -  Cefepime: S 8      -  Ceftazidime: S 4      -  Ciprofloxacin: S <=1      -  Gentamicin: S <=4      -  Imipenem: R 8      -  Levofloxacin: S <=2      -  Meropenem: R >8      -  Piperacillin/Tazobactam: S <=16      -  Tobramycin: S <=4      Method Type: AUTUMN    Culture - Blood (collected 2020 16:38)  Source: .Blood Blood-Peripheral  Final Report:    Growth in aerobic bottle: Pseudomonas aeruginosa    ***Blood Panel PCR results on this specimen are available    approximately 3 hours after the Gram stain result.***    Gram stain, PCR, and/or culture results may not always    correspond due to differencein methodologies.    ************************************************************    This PCR assay was performed using [employer].    The following targets are tested for: Enterococcus,    vancomycin resistant enterococci, Listeria monocytogenes,    coagulase negative staphylococci, S. aureus,    methicillin resistant S. aureus, Streptococcus agalactiae    (Group B), S. pneumoniae, S. pyogenes (Group A),    Acinetobacter baumannii, Enterobacter cloacae, E. coli,    Klebsiella oxytoca, K. pneumoniae, Proteus sp.,    Serratia marcescens, Haemophilus influenzae,    Neisseria meningitidis, Pseudomonas aeruginosa, Candida    albicans, C. glabrata, C krusei, C parapsilosis,    C. tropicalis and the KPC resistance gene.    "Due to technical problems, Proteus sp. will Not be reported as part of    the BCID panel until further notice"  Organism: Blood Culture PCR  Pseudomonas aeruginosa  Organism: Pseudomonas aeruginosa    Sensitivities:      -  Amikacin: S <=16      -  Aztreonam: R >16      -  Cefepime: S 8      -  Ceftazidime: S 8      -  Ciprofloxacin: I 0.5      -  Gentamicin: S <=2      -  Levofloxacin: R 2      -  Meropenem: S <=1      -  Piperacillin/Tazobactam: S 16      -  Tobramycin: S <=2      Method Type: AUTUMN  Organism: Blood Culture PCR    Sensitivities:      -  Pseudomonas aeruginosa: Detec      Method Type: PCR    Culture - Blood (collected 2020 16:38)  Source: .Blood Blood-Peripheral  Final Report:    Growth in aerobic bottle: Pseudomonas aeruginosa    See previous culture [id number]    Culture - Blood (collected 2020 09:04)  Source: .Blood Blood  Final Report:    No growth at 5 days.    Culture - Blood (collected 2020 09:04)  Source: .Blood Blood  Final Report:    No growth at 5 days.    Culture - Urine (collected 2020 23:18)  Source: .Urine Nephrostomy - Right  Final Report:    No growth at 48 hours    Culture - Urine (collected 2020 18:54)  Source: .Urine Clean Catch (Midstream)  Final Report:    <10,000 CFU/mL Normal Urogenital Rae    Culture - Urine (collected 10 Aug 2019 17:52)  Source: .Urine  Final Report:    >=3 organisms. Probable collection contamination.    HIV-1 RNA Quantitative, Viral Load: NOT DET. (08-15-18 @ 14:37)  HIV-1 Viral Load Result: NOT DET. (08-15-18 @ 14:37)    COVID-19 PCR: NotDetec (21 @ 23:22)  COVID-19 PCR: NotDetec (21 @ 16:51)    Blood Gas Venous - Lactate: 0.6 ( @ 22:01)    RADIOLOGY:  imaging below personally reviewed    < from: US Trans Kidney w/ Doppler, Right (21 @ 10:15) >  IMPRESSION:  Elevated postvoid residual with mild to moderate hydronephrosis. Circumferential bladder wall and urothelial thickening with mild bladder wall trabeculations may be secondary to some degree of chronic bladder outlet obstruction. However, correlate with urinalysis.  No evidence of a significant renal artery stenosis.  < end of copied text >    < from: CT Abdomen and Pelvis No Cont (21 @ 00:58) >  IMPRESSION:  *  Right iliac fossa transplant kidney with ureteral stent extending from the right renal pelvis into the bladder.  *  Findings hydronephrosis and urothelial thickening of the renal transplant.  < end of copied text >     Patient is a 70y old  Male who presents with a chief complaint of Sent in for Hyperkalemia and worsening renal function (2021 13:28)    HPI:  70M with PMHx of right renal transplant (~2018), bilateral nephrectomy (secondary to RCC), prostate cancer (on active treatment), HTN, and T2DM sent in by physician for worsening renal function and hyperkalemia. Patient states he was having routine blood work with his physician for his prior history of RCC and found to have worsening renal function and hyperkalemia; thus, patient sent in. He was recently treated for two weeks with ciprofloxacin for pseudomonas urinary tract infection (known to be colonized). Patient denies dysuria or polyuria. He has chronic diarrhea. He states he goes approximately eight times per day for many years. He describes it as non-bloody. He states he's been to several gastroenterologists with colonoscopies and etiology has not been found. He denies fever or chills.    Of note patient does not tolerate high CellCept doses secondary to diarrhea. 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 He has known transplanted kidney hydronephrosis. He recent had stent placed by IR in 2021 and right nephrostomy removed. He had prior history of pseudomonas bacteremia. He has prior history of CMV viremia. In the ED patient had blood and urine cultures drawn. He was given 1 g CTX, 1 L LR, D50, Lokelma and insulin. Patient in good spirits in the ED. He tells me he is taking Orgovyx for prostate cancer. He is pending radiation therapy after finishing his oral therapy. (2021 04:58)    ID consulted for pyuria in a renal trasnplant pt.    He denies fever. No dysuria. But feels pain before initiation of urine, after urinating, he has no pain. He feels he urinates more frequent than usual?  He has intermittent pain on his left forearm AVF side for a long time, currently has no pain.    He is taking cellcept, prednisone 5mg, Bactrim 1 tab daily.       REVIEW OF SYSTEMS  [  ] ROS unobtainable because:    [V] All other systems negative except as noted below    Constitutional:  [ ] fever [ ] chills  [ ] weight loss  [ ]night sweat  [ ]poor appetite/PO intake [ ]fatigue   Skin:  [ ] rash [ ] phlebitis	  Eyes: [ ] icterus [ ] pain  [ ] discharge	  ENMT: [ ] sore throat  [ ] thrush [ ] ulcers [ ] exudates [ ]anosmia  Respiratory: [ ] dyspnea [ ] hemoptysis [ ] cough [ ] sputum	  Cardiovascular:  [ ] chest pain [ ] palpitations [ ] edema	  Gastrointestinal:  [ ] nausea [ ] vomiting [ ] diarrhea [ ] constipation [ ] pain	  Genitourinary:  [ ] dysuria [ V] frequency [ ] hematuria [ ] discharge [ ] flank pain  [ ] incontinence  Musculoskeletal:  [ ] myalgias [ ] arthralgias [ ] arthritis  [ ] back pain  Neurological:  [ ] headache [ ] weakness [ ] seizures  [ ] confusion/altered mental status    prior hospital charts reviewed [V]  primary team notes reviewed [V]  other consultant notes reviewed [V]    PAST MEDICAL & SURGICAL HISTORY:  HTN (hypertension)    Type 2 diabetes mellitus  dx: &#x27;88    ESRD (end stage renal disease)  On Dialysis &#x27;  to 2018    Kidney neoplasm  dx: &#x27;  : Left      s/p bilateral Nephrectomy &#x27; 2015    Hepatitis C  In Donor Kidney: patient received treatment for Hep. 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 Working in property management. No pet  - Born in [place], [place] 19 yo.    FAMILY HISTORY:  Father-DM  Mother-Breast cancer    Allergies  No Known Allergies    ANTIMICROBIALS:  ertapenem  IVPB 500 every 24 hours  trimethoprim   80 mG/sulfamethoxazole 400 mG 1 daily    ANTIMICROBIALS (past 90 days):  MEDICATIONS  (STANDING):  cefepime   IVPB   100 mL/Hr IV Intermittent (21 @ 11:54)    cefTRIAXone   IVPB   100 mL/Hr IV Intermittent (21 @ 01:17)      OTHER MEDS:   MEDICATIONS  (STANDING):  allopurinol 100 daily  dextrose 40% Gel 15 once  dextrose 50% Injectable 25 once  dextrose 50% Injectable 12.5 once  dextrose 50% Injectable 25 once  glucagon  Injectable 1 once  heparin   Injectable 5000 every 8 hours  insulin lispro (ADMELOG) corrective regimen sliding scale  three times a day before meals  insulin lispro (ADMELOG) corrective regimen sliding scale  at bedtime  metoprolol succinate  daily  mycophenolate mofetil 500 two times a day  NIFEdipine XL 60 daily  predniSONE   Tablet 5 daily  tacrolimus ER Tablet (ENVARSUS XR) 7 <User Schedule>  tamsulosin 0.4 at bedtime      VITALS:  Vital Signs Last 24 Hrs  T(F): 97.8 (21 @ 12:19), Max: 98.2 (21 @ 17:57)    Vital Signs Last 24 Hrs  HR: 67 (21 @ 12:19) (60 - 70)  BP: 117/67 (21 @ 12:19) (117/67 - 156/84)  RR: 18 (21 @ 12:19)  SpO2: 97% (21 @ 12:19) (97% - 100%)  Wt(kg): --    EXAM:  GA: NAD  HEENT: oral cavity no lesion  CV: nl S1/S2, no RMG  Lungs: CTAB  Abd: BS+, soft, nontender, no rebounding pain  Ext: no edema. Left forearm AVF thrill+, nontender  Skin: no rash  IV: no phlebitis  No bilateral CVA tenderness    Labs:                        11.5   7.89  )-----------( 143      ( 2021 14:22 )             36.8         136  |  106  |  59<H>  ----------------------------<  160<H>  6.1<H>   |  18<L>  |  4.88<H>    Ca    10.6<H>      2021 14:23  Phos  5.1       Mg     1.6         TPro  7.8  /  Alb  4.2  /  TBili  0.2  /  DBili  x   /  AST  22  /  ALT  14  /  AlkPhos  87        WBC Trend:  WBC Count: 7.89 (21 @ 14:22)  WBC Count: 6.29 (21 @ 22:01)    Auto Neutrophil #: 2.03 K/uL (21 @ 22:01)  Auto Neutrophil #: 1.31 K/uL (20 @ 06:07)  Auto Neutrophil #: 5.35 K/uL (20 @ 13:36)  Band Neutrophils %: 1.7 % (20 @ 13:36)    Creatine Trend:  Creatinine, Serum: 4.88 ()  Creatinine, Serum: 5.21 ()  Creatinine, Serum: 5.31 ()  Creatinine, Serum: 5.53 ()    Liver Biochemical Testing Trend:  Alanine Aminotransferase (ALT/SGPT): 14 ()  Alanine Aminotransferase (ALT/SGPT): 12 ()  Alanine Aminotransferase (ALT/SGPT): 30 (07-10)  Alanine Aminotransferase (ALT/SGPT): 32 ()  Alanine Aminotransferase (ALT/SGPT): 34 ()  Aspartate Aminotransferase (AST/SGOT): 22 (21 @ 14:23)  Aspartate Aminotransferase (AST/SGOT): 18 (21 @ 22:01)  Aspartate Aminotransferase (AST/SGOT): 29 (07-10-20 @ 05:56)  Aspartate Aminotransferase (AST/SGOT): 29 (20 @ 06:07)  Aspartate Aminotransferase (AST/SGOT): 38 (20 @ 06:07)  Bilirubin Total, Serum: 0.2 ()  Bilirubin Total, Serum: 0.2 ()  Bilirubin Total, Serum: 0.2 (07-10)  Bilirubin Total, Serum: 0.2 ()  Bilirubin Total, Serum: 0.2 ()    Urinalysis Basic - ( 2021 23:37 )  Color: Yellow / Appearance: Turbid / S.018 / pH: x  Gluc: x / Ketone: Negative  / Bili: Negative / Urobili: Negative   Blood: x / Protein: 100 mg/dL / Nitrite: Positive   Leuk Esterase: Large / RBC: 30 /hpf /  /HPF   Sq Epi: x / Non Sq Epi: 1 /hpf / Bacteria: Negative        MICROBIOLOGY:    Culture - Blood (collected 2020 09:01)  Source: .Blood Blood-Peripheral  Final Report:    No Growth Final    Culture - Blood (collected 2020 14:50)  Source: .Blood Blood-Peripheral  Final Report:    No Growth Final    Culture - Urine (collected 2020 19:39)  Source: .Urine Clean Catch (Midstream)  Final Report:    10,000 - 49,000 CFU/mL Pseudomonas aeruginosa (Carbapenem Resistant)  Organism: Pseudomonas aeruginosa (Carbapenem Resistant)  Organism: Pseudomonas aeruginosa (Carbapenem Resistant)    Sensitivities:      -  Amikacin: S <=16      -  Aztreonam: R >16      -  Cefepime: S 8      -  Ceftazidime: S 4      -  Ciprofloxacin: S <=1      -  Gentamicin: S <=4      -  Imipenem: R 8      -  Levofloxacin: S <=2      -  Meropenem: R >8      -  Piperacillin/Tazobactam: S <=16      -  Tobramycin: S <=4      Method Type: AUTUMN    Culture - Blood (collected 2020 16:38)  Source: .Blood Blood-Peripheral  Final Report:    Growth in aerobic bottle: Pseudomonas aeruginosa    ***Blood Panel PCR results on this specimen are available    approximately 3 hours after the Gram stain result.***    Gram stain, PCR, and/or culture results may not always    correspond due to differencein methodologies.    ************************************************************    This PCR assay was performed using [employer].    The following targets are tested for: Enterococcus,    vancomycin resistant enterococci, Listeria monocytogenes,    coagulase negative staphylococci, S. aureus,    methicillin resistant S. aureus, Streptococcus agalactiae    (Group B), S. pneumoniae, S. pyogenes (Group A),    Acinetobacter baumannii, Enterobacter cloacae, E. coli,    Klebsiella oxytoca, K. pneumoniae, Proteus sp.,    Serratia marcescens, Haemophilus influenzae,    Neisseria meningitidis, Pseudomonas aeruginosa, Candida    albicans, C. glabrata, C krusei, C parapsilosis,    C. tropicalis and the KPC resistance gene.    "Due to technical problems, Proteus sp. will Not be reported as part of    the BCID panel until further notice"  Organism: Blood Culture PCR  Pseudomonas aeruginosa  Organism: Pseudomonas aeruginosa    Sensitivities:      -  Amikacin: S <=16      -  Aztreonam: R >16      -  Cefepime: S 8      -  Ceftazidime: S 8      -  Ciprofloxacin: I 0.5      -  Gentamicin: S <=2      -  Levofloxacin: R 2      -  Meropenem: S <=1      -  Piperacillin/Tazobactam: S 16      -  Tobramycin: S <=2      Method Type: AUTUMN  Organism: Blood Culture PCR    Sensitivities:      -  Pseudomonas aeruginosa: Detec      Method Type: PCR    Culture - Blood (collected 2020 16:38)  Source: .Blood Blood-Peripheral  Final Report:    Growth in aerobic bottle: Pseudomonas aeruginosa    See previous culture [id number]    Culture - Blood (collected 2020 09:04)  Source: .Blood Blood  Final Report:    No growth at 5 days.    Culture - Blood (collected 2020 09:04)  Source: .Blood Blood  Final Report:    No growth at 5 days.    Culture - Urine (collected 2020 23:18)  Source: .Urine Nephrostomy - Right  Final Report:    No growth at 48 hours    Culture - Urine (collected 2020 18:54)  Source: .Urine Clean Catch (Midstream)  Final Report:    <10,000 CFU/mL Normal Urogenital Rae    Culture - Urine (collected 10 Aug 2019 17:52)  Source: .Urine  Final Report:    >=3 organisms. Probable collection contamination.    HIV-1 RNA Quantitative, Viral Load: NOT DET. (08-15-18 @ 14:37)  HIV-1 Viral Load Result: NOT DET. (08-15-18 @ 14:37)    COVID-19 PCR: NotDetec (21 @ 23:22)  COVID-19 PCR: NotDetec (21 @ 16:51)    Blood Gas Venous - Lactate: 0.6 ( @ 22:01)    RADIOLOGY:  imaging below personally reviewed    < from: US Trans Kidney w/ Doppler, Right (21 @ 10:15) >  IMPRESSION:  Elevated postvoid residual with mild to moderate hydronephrosis. Circumferential bladder wall and urothelial thickening with mild bladder wall trabeculations may be secondary to some degree of chronic bladder outlet obstruction. However, correlate with urinalysis.  No evidence of a significant renal artery stenosis.  < end of copied text >    < from: CT Abdomen and Pelvis No Cont (21 @ 00:58) >  IMPRESSION:  *  Right iliac fossa transplant kidney with ureteral stent extending from the right renal pelvis into the bladder.  *  Findings hydronephrosis and urothelial thickening of the renal transplant.  < end of copied text >     Patient is a 70y old  Male who presents with a chief complaint of Sent in for Hyperkalemia and worsening renal function (2021 13:28)    HPI:  70M with PMHx of right renal transplant (~2018), bilateral nephrectomy (secondary to RCC), prostate cancer (on active treatment), HTN, and T2DM sent in by physician for worsening renal function and hyperkalemia. Patient states he was having routine blood work with his physician for his prior history of RCC and found to have worsening renal function and hyperkalemia; thus, patient sent in. He was recently treated for two weeks with ciprofloxacin for pseudomonas urinary tract infection (known to be colonized). Patient denies dysuria or polyuria. He has chronic diarrhea. He states he goes approximately eight times per day for many years. He describes it as non-bloody. He states he's been to several gastroenterologists with colonoscopies and etiology has not been found. He denies fever or chills.    Of note patient does not tolerate high CellCept doses secondary to diarrhea. 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 He has known transplanted kidney hydronephrosis. He recent had stent placed by IR in 2021 and right nephrostomy removed. He had prior history of pseudomonas bacteremia. He has prior history of CMV viremia. In the ED patient had blood and urine cultures drawn. He was given 1 g CTX, 1 L LR, D50, Lokelma and insulin. Patient in good spirits in the ED. He tells me he is taking Orgovyx for prostate cancer. He is pending radiation therapy after finishing his oral therapy. (2021 04:58)    ID consulted for pyuria in a renal trasnplant pt.    He denies fever. No dysuria. But feels pain before initiation of urine, after urinating, he has no pain. He feels he urinates more frequent than usual?  He has intermittent pain on his left forearm AVF side for a long time, currently has no pain.    He is taking cellcept, Envarsus XR 7mg daily, prednisone 5mg, Bactrim 1 tab daily.       REVIEW OF SYSTEMS  [  ] ROS unobtainable because:    [V] All other systems negative except as noted below    Constitutional:  [ ] fever [ ] chills  [ ] weight loss  [ ]night sweat  [ ]poor appetite/PO intake [ ]fatigue   Skin:  [ ] rash [ ] phlebitis	  Eyes: [ ] icterus [ ] pain  [ ] discharge	  ENMT: [ ] sore throat  [ ] thrush [ ] ulcers [ ] exudates [ ]anosmia  Respiratory: [ ] dyspnea [ ] hemoptysis [ ] cough [ ] sputum	  Cardiovascular:  [ ] chest pain [ ] palpitations [ ] edema	  Gastrointestinal:  [ ] nausea [ ] vomiting [ ] diarrhea [ ] constipation [ ] pain	  Genitourinary:  [ ] dysuria [ V] frequency [ ] hematuria [ ] discharge [ ] flank pain  [ ] incontinence  Musculoskeletal:  [ ] myalgias [ ] arthralgias [ ] arthritis  [ ] back pain  Neurological:  [ ] headache [ ] weakness [ ] seizures  [ ] confusion/altered mental status    prior hospital charts reviewed [V]  primary team notes reviewed [V]  other consultant notes reviewed [V]    PAST MEDICAL & SURGICAL HISTORY:  HTN (hypertension)    Type 2 diabetes mellitus  dx: &#x27;88    ESRD (end stage renal disease)  On Dialysis &#x27;  to 2018    Kidney neoplasm  dx: &#x27;  : Left      s/p bilateral Nephrectomy &#x27;     Hepatitis C  In Donor Kidney: patient received treatment for Hep. C : post treatment: patient  tested Negative for Hep C    Benign prostatic hypertrophy    Anal fissure  dx: &#x27;    No surgery    Bowel obstruction  &#x27; 2017   surgically treated    Medial meniscus tear  &#x27; 90&#x27;s  Right    AV fistula  2013/ left forearm    S/p nephrectomy  left 9/15/2015   + Cancer    S/p nephrectomy  right 12/10/2015   benign    H/O ileostomy  &#x27; 2017   for 3 months. 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    FAMILY HISTORY:  Father-DM  Mother-Breast cancer    Allergies  No Known Allergies    ANTIMICROBIALS:  ertapenem  IVPB 500 every 24 hours  trimethoprim   80 mG/sulfamethoxazole 400 mG 1 daily    ANTIMICROBIALS (past 90 days):  MEDICATIONS  (STANDING):  cefepime   IVPB   100 mL/Hr IV Intermittent (21 @ 11:54)    cefTRIAXone   IVPB   100 mL/Hr IV Intermittent (21 @ 01:17)      OTHER MEDS:   MEDICATIONS  (STANDING):  allopurinol 100 daily  dextrose 40% Gel 15 once  dextrose 50% Injectable 25 once  dextrose 50% Injectable 12.5 once  dextrose 50% Injectable 25 once  glucagon  Injectable 1 once  heparin   Injectable 5000 every 8 hours  insulin lispro (ADMELOG) corrective regimen sliding scale  three times a day before meals  insulin lispro (ADMELOG) corrective regimen sliding scale  at bedtime  metoprolol succinate  daily  mycophenolate mofetil 500 two times a day  NIFEdipine XL 60 daily  predniSONE   Tablet 5 daily  tacrolimus ER Tablet (ENVARSUS XR) 7 <User Schedule>  tamsulosin 0.4 at bedtime      VITALS:  Vital Signs Last 24 Hrs  T(F): 97.8 (21 @ 12:19), Max: 98.2 (21 @ 17:57)    Vital Signs Last 24 Hrs  HR: 67 (21 @ 12:19) (60 - 70)  BP: 117/67 (21 @ 12:19) (117/67 - 156/84)  RR: 18 (21 @ 12:19)  SpO2: 97% (21 @ 12:19) (97% - 100%)  Wt(kg): --    EXAM:  GA: NAD  HEENT: oral cavity no lesion  CV: nl S1/S2, no RMG  Lungs: CTAB  Abd: BS+, soft, nontender, no rebounding pain  Ext: no edema. Left forearm AVF thrill+, nontender  Skin: no rash  IV: no phlebitis  No bilateral CVA tenderness    Labs:                        11.5   7.89  )-----------( 143      ( 2021 14:22 )             36.8         136  |  106  |  59<H>  ----------------------------<  160<H>  6.1<H>   |  18<L>  |  4.88<H>    Ca    10.6<H>      2021 14:23  Phos  5.1       Mg     1.6         TPro  7.8  /  Alb  4.2  /  TBili  0.2  /  DBili  x   /  AST  22  /  ALT  14  /  AlkPhos  87        WBC Trend:  WBC Count: 7.89 (21 @ 14:22)  WBC Count: 6.29 (21 @ 22:01)    Auto Neutrophil #: 2.03 K/uL (21 @ 22:01)  Auto Neutrophil #: 1.31 K/uL (20 @ 06:07)  Auto Neutrophil #: 5.35 K/uL (20 @ 13:36)  Band Neutrophils %: 1.7 % (20 @ 13:36)    Creatine Trend:  Creatinine, Serum: 4.88 ()  Creatinine, Serum: 5.21 ()  Creatinine, Serum: 5.31 ()  Creatinine, Serum: 5.53 ()    Liver Biochemical Testing Trend:  Alanine Aminotransferase (ALT/SGPT): 14 ()  Alanine Aminotransferase (ALT/SGPT): 12 ()  Alanine Aminotransferase (ALT/SGPT): 30 (07-10)  Alanine Aminotransferase (ALT/SGPT): 32 ()  Alanine Aminotransferase (ALT/SGPT): 34 (08)  Aspartate Aminotransferase (AST/SGOT): 22 (21 @ 14:23)  Aspartate Aminotransferase (AST/SGOT): 18 (21 @ 22:01)  Aspartate Aminotransferase (AST/SGOT): 29 (07-10-20 @ 05:56)  Aspartate Aminotransferase (AST/SGOT): 29 (20 @ 06:07)  Aspartate Aminotransferase (AST/SGOT): 38 (20 @ 06:07)  Bilirubin Total, Serum: 0.2 ()  Bilirubin Total, Serum: 0.2 ()  Bilirubin Total, Serum: 0.2 (07-10)  Bilirubin Total, Serum: 0.2 ()  Bilirubin Total, Serum: 0.2 ()    Urinalysis Basic - ( 2021 23:37 )  Color: Yellow / Appearance: Turbid / S.018 / pH: x  Gluc: x / Ketone: Negative  / Bili: Negative / Urobili: Negative   Blood: x / Protein: 100 mg/dL / Nitrite: Positive   Leuk Esterase: Large / RBC: 30 /hpf /  /HPF   Sq Epi: x / Non Sq Epi: 1 /hpf / Bacteria: Negative        MICROBIOLOGY:    Culture - Blood (collected 2020 09:01)  Source: .Blood Blood-Peripheral  Final Report:    No Growth Final    Culture - Blood (collected 2020 14:50)  Source: .Blood Blood-Peripheral  Final Report:    No Growth Final    Culture - Urine (collected 2020 19:39)  Source: .Urine Clean Catch (Midstream)  Final Report:    10,000 - 49,000 CFU/mL Pseudomonas aeruginosa (Carbapenem Resistant)  Organism: Pseudomonas aeruginosa (Carbapenem Resistant)  Organism: Pseudomonas aeruginosa (Carbapenem Resistant)    Sensitivities:      -  Amikacin: S <=16      -  Aztreonam: R >16      -  Cefepime: S 8      -  Ceftazidime: S 4      -  Ciprofloxacin: S <=1      -  Gentamicin: S <=4      -  Imipenem: R 8      -  Levofloxacin: S <=2      -  Meropenem: R >8      -  Piperacillin/Tazobactam: S <=16      -  Tobramycin: S <=4      Method Type: AUTUMN    Culture - Blood (collected 2020 16:38)  Source: .Blood Blood-Peripheral  Final Report:    Growth in aerobic bottle: Pseudomonas aeruginosa    ***Blood Panel PCR results on this specimen are available    approximately 3 hours after the Gram stain result.***    Gram stain, PCR, and/or culture results may not always    correspond due to differencein methodologies.    ************************************************************    This PCR assay was performed using [employer].    The following targets are tested for: Enterococcus,    vancomycin resistant enterococci, Listeria monocytogenes,    coagulase negative staphylococci, S. aureus,    methicillin resistant S. aureus, Streptococcus agalactiae    (Group B), S. pneumoniae, S. pyogenes (Group A),    Acinetobacter baumannii, Enterobacter cloacae, E. coli,    Klebsiella oxytoca, K. pneumoniae, Proteus sp.,    Serratia marcescens, Haemophilus influenzae,    Neisseria meningitidis, Pseudomonas aeruginosa, Candida    albicans, C. glabrata, C krusei, C parapsilosis,    C. tropicalis and the KPC resistance gene.    "Due to technical problems, Proteus sp. will Not be reported as part of    the BCID panel until further notice"  Organism: Blood Culture PCR  Pseudomonas aeruginosa  Organism: Pseudomonas aeruginosa    Sensitivities:      -  Amikacin: S <=16      -  Aztreonam: R >16      -  Cefepime: S 8      -  Ceftazidime: S 8      -  Ciprofloxacin: I 0.5      -  Gentamicin: S <=2      -  Levofloxacin: R 2      -  Meropenem: S <=1      -  Piperacillin/Tazobactam: S 16      -  Tobramycin: S <=2      Method Type: AUTUMN  Organism: Blood Culture PCR    Sensitivities:      -  Pseudomonas aeruginosa: Detec      Method Type: PCR    Culture - Blood (collected 2020 16:38)  Source: .Blood Blood-Peripheral  Final Report:    Growth in aerobic bottle: Pseudomonas aeruginosa    See previous culture [id number]    Culture - Blood (collected 2020 09:04)  Source: .Blood Blood  Final Report:    No growth at 5 days.    Culture - Blood (collected 2020 09:04)  Source: .Blood Blood  Final Report:    No growth at 5 days.    Culture - Urine (collected 2020 23:18)  Source: .Urine Nephrostomy - Right  Final Report:    No growth at 48 hours    Culture - Urine (collected 2020 18:54)  Source: .Urine Clean Catch (Midstream)  Final Report:    <10,000 CFU/mL Normal Urogenital Rae    Culture - Urine (collected 10 Aug 2019 17:52)  Source: .Urine  Final Report:    >=3 organisms. Probable collection contamination.    HIV-1 RNA Quantitative, Viral Load: NOT DET. (08-15-18 @ 14:37)  HIV-1 Viral Load Result: NOT DET. (08-15-18 @ 14:37)    COVID-19 PCR: NotDetec (21 @ 23:22)  COVID-19 PCR: NotDetec (21 @ 16:51)    Blood Gas Venous - Lactate: 0.6 ( @ 22:01)    RADIOLOGY:  imaging below personally reviewed    < from: US Trans Kidney w/ Doppler, Right (21 @ 10:15) >  IMPRESSION:  Elevated postvoid residual with mild to moderate hydronephrosis. Circumferential bladder wall and urothelial thickening with mild bladder wall trabeculations may be secondary to some degree of chronic bladder outlet obstruction. However, correlate with urinalysis.  No evidence of a significant renal artery stenosis.  < end of copied text >    < from: CT Abdomen and Pelvis No Cont (21 @ 00:58) >  IMPRESSION:  *  Right iliac fossa transplant kidney with ureteral stent extending from the right renal pelvis into the bladder.  *  Findings hydronephrosis and urothelial thickening of the renal transplant.  < end of copied text >     Patient is a 70y old  Male who presents with a chief complaint of Sent in for Hyperkalemia and worsening renal function (2021 13:28)    HPI:    70M with PMHx of right renal transplant (~2018), bilateral nephrectomy (secondary to RCC), prostate cancer (on active treatment), HTN, and T2DM sent in by physician for worsening renal function and hyperkalemia. Patient states he was having routine blood work with his physician for his prior history of RCC and found to have worsening renal function and hyperkalemia; thus, patient sent in. He was recently treated for two weeks with ciprofloxacin for pseudomonas urinary tract infection (known to be colonized). Patient denies dysuria or polyuria. He has chronic diarrhea. He states he goes approximately eight times per day for many years. He describes it as non-bloody. He states he's been to several gastroenterologists with colonoscopies and etiology has not been found. He denies fever or chills.    Of note patient does not tolerate high CellCept doses secondary to diarrhea. 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 He has known transplanted kidney hydronephrosis. He recent had stent placed by IR in 2021 and right nephrostomy removed. He had prior history of pseudomonas bacteremia. He has prior history of CMV viremia. In the ED patient had blood and urine cultures drawn. He was given 1 g CTX, 1 L LR, D50, Lokelma and insulin. Patient in good spirits in the ED. He tells me he is taking Orgovyx for prostate cancer. He is pending radiation therapy after finishing his oral therapy. (2021 04:58)    ID consulted for pyuria in a renal trasnplant pt.    He denies fever. No dysuria. But feels pain before initiation of urine, after urinating, he has no pain. He feels he urinates more frequent than usual?  He has intermittent pain on his left forearm AVF side for a long time, currently has no pain.    He is taking cellcept, Envarsus XR 7mg daily, prednisone 5mg, Bactrim 1 tab daily.       REVIEW OF SYSTEMS  [  ] ROS unobtainable because:    [V] All other systems negative except as noted below    Constitutional:  [ ] fever [ ] chills  [ ] weight loss  [ ]night sweat  [ ]poor appetite/PO intake [ ]fatigue   Skin:  [ ] rash [ ] phlebitis	  Eyes: [ ] icterus [ ] pain  [ ] discharge	  ENMT: [ ] sore throat  [ ] thrush [ ] ulcers [ ] exudates [ ]anosmia  Respiratory: [ ] dyspnea [ ] hemoptysis [ ] cough [ ] sputum	  Cardiovascular:  [ ] chest pain [ ] palpitations [ ] edema	  Gastrointestinal:  [ ] nausea [ ] vomiting [ ] diarrhea [ ] constipation [ ] pain	  Genitourinary:  [ ] dysuria [ V] frequency [ ] hematuria [ ] discharge [ ] flank pain  [ ] incontinence  Musculoskeletal:  [ ] myalgias [ ] arthralgias [ ] arthritis  [ ] back pain  Neurological:  [ ] headache [ ] weakness [ ] seizures  [ ] confusion/altered mental status    prior hospital charts reviewed [V]  primary team notes reviewed [V]  other consultant notes reviewed [V]    PAST MEDICAL & SURGICAL HISTORY:  HTN (hypertension)    Type 2 diabetes mellitus  dx: &#x27;88    ESRD (end stage renal disease)  On Dialysis &#x27;  to 2018    Kidney neoplasm  dx: &#x27;  : Left      s/p bilateral Nephrectomy &#x27;     Hepatitis C  In Donor Kidney: patient received treatment for Hep. C : post treatment: patient  tested Negative for Hep C    Benign prostatic hypertrophy    Anal fissure  dx: &#x27;    No surgery    Bowel obstruction  &#x27; 2017   surgically treated    Medial meniscus tear  &#x27; 90&#x27;s  Right    AV fistula  2013/ left forearm    S/p nephrectomy  left 9/15/2015   + Cancer    S/p nephrectomy  right 12/10/2015   benign    H/O ileostomy  &#x27; 2017   for 3 months. 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    FAMILY HISTORY:  Father-DM  Mother-Breast cancer    Allergies  No Known Allergies    ANTIMICROBIALS:  ertapenem  IVPB 500 every 24 hours  trimethoprim   80 mG/sulfamethoxazole 400 mG 1 daily    ANTIMICROBIALS (past 90 days):  MEDICATIONS  (STANDING):  cefepime   IVPB   100 mL/Hr IV Intermittent (21 @ 11:54)    cefTRIAXone   IVPB   100 mL/Hr IV Intermittent (21 @ 01:17)      OTHER MEDS:   MEDICATIONS  (STANDING):  allopurinol 100 daily  dextrose 40% Gel 15 once  dextrose 50% Injectable 25 once  dextrose 50% Injectable 12.5 once  dextrose 50% Injectable 25 once  glucagon  Injectable 1 once  heparin   Injectable 5000 every 8 hours  insulin lispro (ADMELOG) corrective regimen sliding scale  three times a day before meals  insulin lispro (ADMELOG) corrective regimen sliding scale  at bedtime  metoprolol succinate  daily  mycophenolate mofetil 500 two times a day  NIFEdipine XL 60 daily  predniSONE   Tablet 5 daily  tacrolimus ER Tablet (ENVARSUS XR) 7 <User Schedule>  tamsulosin 0.4 at bedtime      VITALS:  Vital Signs Last 24 Hrs  T(F): 97.8 (21 @ 12:19), Max: 98.2 (21 @ 17:57)    Vital Signs Last 24 Hrs  HR: 67 (21 @ 12:19) (60 - 70)  BP: 117/67 (21 @ 12:19) (117/67 - 156/84)  RR: 18 (21 @ 12:19)  SpO2: 97% (21 @ 12:19) (97% - 100%)  Wt(kg): --    EXAM:  GA: NAD  HEENT: oral cavity no lesion  CV: nl S1/S2, no RMG  Lungs: CTAB  Abd: BS+, soft, nontender, no rebounding pain  Ext: no edema. Left forearm AVF thrill+, nontender  Skin: no rash  IV: no phlebitis  No bilateral CVA tenderness    Labs:                        11.5   7.89  )-----------( 143      ( 2021 14:22 )             36.8         136  |  106  |  59<H>  ----------------------------<  160<H>  6.1<H>   |  18<L>  |  4.88<H>    Ca    10.6<H>      2021 14:23  Phos  5.1       Mg     1.6         TPro  7.8  /  Alb  4.2  /  TBili  0.2  /  DBili  x   /  AST  22  /  ALT  14  /  AlkPhos  87        WBC Trend:  WBC Count: 7.89 (21 @ 14:22)  WBC Count: 6.29 (21 @ 22:01)    Auto Neutrophil #: 2.03 K/uL (21 @ 22:01)  Auto Neutrophil #: 1.31 K/uL (20 @ 06:07)  Auto Neutrophil #: 5.35 K/uL (20 @ 13:36)  Band Neutrophils %: 1.7 % (20 @ 13:36)    Creatine Trend:  Creatinine, Serum: 4.88 ()  Creatinine, Serum: 5.21 ()  Creatinine, Serum: 5.31 ()  Creatinine, Serum: 5.53 ()    Liver Biochemical Testing Trend:  Alanine Aminotransferase (ALT/SGPT): 14 ()  Alanine Aminotransferase (ALT/SGPT): 12 ()  Alanine Aminotransferase (ALT/SGPT): 30 (07-10)  Alanine Aminotransferase (ALT/SGPT): 32 ()  Alanine Aminotransferase (ALT/SGPT): 34 (08)  Aspartate Aminotransferase (AST/SGOT): 22 (21 @ 14:23)  Aspartate Aminotransferase (AST/SGOT): 18 (21 @ 22:01)  Aspartate Aminotransferase (AST/SGOT): 29 (07-10-20 @ 05:56)  Aspartate Aminotransferase (AST/SGOT): 29 (20 @ 06:07)  Aspartate Aminotransferase (AST/SGOT): 38 (20 @ 06:07)  Bilirubin Total, Serum: 0.2 ()  Bilirubin Total, Serum: 0.2 ()  Bilirubin Total, Serum: 0.2 (07-10)  Bilirubin Total, Serum: 0.2 ()  Bilirubin Total, Serum: 0.2 ()    Urinalysis Basic - ( 2021 23:37 )  Color: Yellow / Appearance: Turbid / S.018 / pH: x  Gluc: x / Ketone: Negative  / Bili: Negative / Urobili: Negative   Blood: x / Protein: 100 mg/dL / Nitrite: Positive   Leuk Esterase: Large / RBC: 30 /hpf /  /HPF   Sq Epi: x / Non Sq Epi: 1 /hpf / Bacteria: Negative        MICROBIOLOGY:    Culture - Blood (collected 2020 09:01)  Source: .Blood Blood-Peripheral  Final Report:    No Growth Final    Culture - Blood (collected 2020 14:50)  Source: .Blood Blood-Peripheral  Final Report:    No Growth Final    Culture - Urine (collected 2020 19:39)  Source: .Urine Clean Catch (Midstream)  Final Report:    10,000 - 49,000 CFU/mL Pseudomonas aeruginosa (Carbapenem Resistant)  Organism: Pseudomonas aeruginosa (Carbapenem Resistant)  Organism: Pseudomonas aeruginosa (Carbapenem Resistant)    Sensitivities:      -  Amikacin: S <=16      -  Aztreonam: R >16      -  Cefepime: S 8      -  Ceftazidime: S 4      -  Ciprofloxacin: S <=1      -  Gentamicin: S <=4      -  Imipenem: R 8      -  Levofloxacin: S <=2      -  Meropenem: R >8      -  Piperacillin/Tazobactam: S <=16      -  Tobramycin: S <=4      Method Type: AUTUMN    Culture - Blood (collected 2020 16:38)  Source: .Blood Blood-Peripheral  Final Report:    Growth in aerobic bottle: Pseudomonas aeruginosa    ***Blood Panel PCR results on this specimen are available    approximately 3 hours after the Gram stain result.***    Gram stain, PCR, and/or culture results may not always    correspond due to differencein methodologies.    ************************************************************    This PCR assay was performed using [employer].    The following targets are tested for: Enterococcus,    vancomycin resistant enterococci, Listeria monocytogenes,    coagulase negative staphylococci, S. aureus,    methicillin resistant S. aureus, Streptococcus agalactiae    (Group B), S. pneumoniae, S. pyogenes (Group A),    Acinetobacter baumannii, Enterobacter cloacae, E. coli,    Klebsiella oxytoca, K. pneumoniae, Proteus sp.,    Serratia marcescens, Haemophilus influenzae,    Neisseria meningitidis, Pseudomonas aeruginosa, Candida    albicans, C. glabrata, C krusei, C parapsilosis,    C. tropicalis and the KPC resistance gene.    "Due to technical problems, Proteus sp. will Not be reported as part of    the BCID panel until further notice"  Organism: Blood Culture PCR  Pseudomonas aeruginosa  Organism: Pseudomonas aeruginosa    Sensitivities:      -  Amikacin: S <=16      -  Aztreonam: R >16      -  Cefepime: S 8      -  Ceftazidime: S 8      -  Ciprofloxacin: I 0.5      -  Gentamicin: S <=2      -  Levofloxacin: R 2      -  Meropenem: S <=1      -  Piperacillin/Tazobactam: S 16      -  Tobramycin: S <=2      Method Type: AUTUMN  Organism: Blood Culture PCR    Sensitivities:      -  Pseudomonas aeruginosa: Detec      Method Type: PCR    Culture - Blood (collected 2020 16:38)  Source: .Blood Blood-Peripheral  Final Report:    Growth in aerobic bottle: Pseudomonas aeruginosa    See previous culture [id number]    Culture - Blood (collected 2020 09:04)  Source: .Blood Blood  Final Report:    No growth at 5 days.    Culture - Blood (collected 2020 09:04)  Source: .Blood Blood  Final Report:    No growth at 5 days.    Culture - Urine (collected 2020 23:18)  Source: .Urine Nephrostomy - Right  Final Report:    No growth at 48 hours    Culture - Urine (collected 2020 18:54)  Source: .Urine Clean Catch (Midstream)  Final Report:    <10,000 CFU/mL Normal Urogenital Rae    Culture - Urine (collected 10 Aug 2019 17:52)  Source: .Urine  Final Report:    >=3 organisms. Probable collection contamination.    HIV-1 RNA Quantitative, Viral Load: NOT DET. (08-15-18 @ 14:37)  HIV-1 Viral Load Result: NOT DET. (08-15-18 @ 14:37)    COVID-19 PCR: NotDetec (21 @ 23:22)  COVID-19 PCR: NotDetec (21 @ 16:51)    Blood Gas Venous - Lactate: 0.6 ( @ 22:01)    RADIOLOGY:    <The imaging below has been reviewed and visualized by me independently. Findings as detailed in report below>    < from: US Trans Kidney w/ Doppler, Right (21 @ 10:15) >  IMPRESSION:  Elevated postvoid residual with mild to moderate hydronephrosis. Circumferential bladder wall and urothelial thickening with mild bladder wall trabeculations may be secondary to some degree of chronic bladder outlet obstruction. However, correlate with urinalysis.  No evidence of a significant renal artery stenosis.  < end of copied text >    < from: CT Abdomen and Pelvis No Cont (21 @ 00:58) >  IMPRESSION:  *  Right iliac fossa transplant kidney with ureteral stent extending from the right renal pelvis into the bladder.  *  Findings hydronephrosis and urothelial thickening of the renal transplant.  < end of copied text >

## 2021-06-02 NOTE — CONSULT NOTE ADULT - REASON FOR ADMISSION
Sent in for Hyperkalemia and worsening renal function
Sent in for Hyperkalemia and worsening renal function

## 2021-06-02 NOTE — PROGRESS NOTE ADULT - PROBLEM SELECTOR PLAN 8
-Continue with Nifedipine    #Diarrhea  -Possible from Mag oxide?  will need to see if we can stop this with renal

## 2021-06-02 NOTE — CHART NOTE - NSCHARTNOTEFT_GEN_A_CORE
Called by RN for patient with hyperkalemia, serum potassium 6.1.  Patient is on Lokelma TID for hyperkalemia, last dose administered this morning.  Will continue Lokelma regimen, order calcium gluconate, D50%, and insulin to temporize.  BMP in am.  d/w Dr. Moore and Dr. Grullon (Nephrology).    Lindy Avelar NP  [phone] Called by RN for patient with hyperkalemia, serum potassium 6.1.  Patient is on Lokelma TID x 1 day for hyperkalemia, last dose administered this morning.  Will continue Lokelma regimen, order calcium gluconate, D50%, and insulin to temporize.  BMP in am - anticipate resolution of hyperkalemia, however, if still hyperkalemic will need additional treatment.  d/w Dr. Moore and Dr. Grullon (Nephrology).    Lindy Avelar NP  [phone]

## 2021-06-02 NOTE — H&P ADULT - NSICDXPASTSURGICALHX_GEN_ALL_CORE_FT
PAST SURGICAL HISTORY:  AV fistula 2013/ left forearm    H/O ileostomy 2017   for 3 months. s/p Reversal    Kidney transplant recipient 2018  @ [facility] :  +  Hep C Donor    S/p nephrectomy left 9/15/2015   + Cancer    S/p nephrectomy right 12/10/2015   benign    S/P right knee arthroscopy

## 2021-06-02 NOTE — PROGRESS NOTE ADULT - PROBLEM SELECTOR PLAN 1
-Transplant Nephrology consulted and will continue with Envarsus, Prednisone and Cellcept  -F/U urine electrolytes & u cx  -  -Renal Transplant US  -May require biopsy pending clinical course  -Check post-residual void and if retaining will get urology for possible stent removal and armenta

## 2021-06-02 NOTE — H&P ADULT - NSHPPHYSICALEXAM_GEN_ALL_CORE
Pt presents to ED c/o CP that radiates from one side to another, and abdominal pain for past couple days. Feeling weak.    T(C): 36.5 (06-02-21 @ 02:44), Max: 36.8 (06-01-21 @ 17:57)  HR: 63 (06-02-21 @ 02:44) (60 - 70)  BP: 128/69 (06-02-21 @ 02:44) (128/69 - 153/78)  RR: 18 (06-02-21 @ 02:44) (18 - 20)  SpO2: 100% (06-02-21 @ 02:44) (99% - 100%)    Gen: male in NAD, appears comfortable, no diaphoresis  HEENT: NCAT, MMM, neck soft and supple  CV: +S1/S2, no m/r/g  Resp: CTAB, no w/r/r  GI: normoactive BS, soft, NTND, no rebounding/guarding  Ext: No LE edema, extremities appear warm and well perfused   Neuro: AOx3, no focal deficits, CNII-XII grossly intact  Psych: No SI/HI/AVH, appropriate affect  Skin: no petechiae, ecchymosis or maculopapular rash noted

## 2021-06-02 NOTE — PROVIDER CONTACT NOTE (OTHER) - ASSESSMENT
Patient A&OX4. Able to make needs known.  earlier. Patient denies lower abdomen pain. Upon Velez catheter insertion, patient experienced pain and resistance met. Velez catheter was removed immediately. Patient now comfortable.

## 2021-06-02 NOTE — CONSULT NOTE ADULT - TIME BILLING
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 DM, HTN, UTI, Chronic allograft dysfunction,CHELA, Noted biopsy 2019  Patient seen, examined and reviewed available clinical and lab data including history,  progress notes and consult notes.  Reviewed immunosuppression and allograft function including urine out put, creatinine trend, urine studies and any allograft and bladder imaging.  Reviewed medication regimen for glycemic control and blood pressure control  Suggestions:  Low K diet, Lokelma, Invanz, urine and blood culture, CT imaging  Continue current immunosuppression, target Tac level 4-6 ng/ml  Will follow  I was present during and reviewed clinical and lab data as well as assessment and plan as documented by the house staff as noted. Please contact if any additional questions with any change in clinical condition or on availability of any additional information or reports.

## 2021-06-02 NOTE — H&P ADULT - ASSESSMENT
70M with PMHx of right renal transplant (~2018), bilateral nephrectomy (secondary to RCC), prostate cancer (on active treatment), HTN, and T2DM sent in by physician for worsening renal function and hyperkalemia.

## 2021-06-02 NOTE — PROGRESS NOTE ADULT - PROBLEM SELECTOR PLAN 3
Reviewed outpt Pseudomonas only sensitive to Meropenem, intermediate to cefepime but our previous cultures are not sensitive to carbapenems so will call ID as well

## 2021-06-02 NOTE — PROGRESS NOTE ADULT - SUBJECTIVE AND OBJECTIVE BOX
PROGRESS NOTE:   Diana Moore DO  Hospitalist  Pager [phone]  After 5pm/weekends or if no answer ext: [phone]      Patient is a 70y old  Male who presents with a chief complaint of Sent in for Hyperkalemia and worsening renal function (2021 07:28)      SUBJECTIVE / OVERNIGHT EVENTS: PABLO feeling well.  Does admit to some frequency and possible incomplete urinary evacuation occasionally.     ADDITIONAL REVIEW OF SYSTEMS:  No fever or chills  no n/v + diarrhea    MEDICATIONS  (STANDING):  allopurinol 100 milliGRAM(s) Oral daily  citric acid/sodium citrate Solution 30 milliLiter(s) Oral two times a day  dextrose 40% Gel 15 Gram(s) Oral once  dextrose 5%. 1000 milliLiter(s) (50 mL/Hr) IV Continuous <Continuous>  dextrose 5%. 1000 milliLiter(s) (100 mL/Hr) IV Continuous <Continuous>  dextrose 50% Injectable 25 Gram(s) IV Push once  dextrose 50% Injectable 12.5 Gram(s) IV Push once  dextrose 50% Injectable 25 Gram(s) IV Push once  ertapenem  IVPB 500 milliGRAM(s) IV Intermittent every 24 hours  glucagon  Injectable 1 milliGRAM(s) IntraMuscular once  heparin   Injectable 5000 Unit(s) SubCutaneous every 8 hours  insulin lispro (ADMELOG) corrective regimen sliding scale   SubCutaneous three times a day before meals  insulin lispro (ADMELOG) corrective regimen sliding scale   SubCutaneous at bedtime  lactated ringers. 1000 milliLiter(s) (100 mL/Hr) IV Continuous <Continuous>  magnesium oxide 400 milliGRAM(s) Oral daily  metoprolol succinate  milliGRAM(s) Oral daily  mycophenolate mofetil 500 milliGRAM(s) Oral two times a day  NIFEdipine XL 60 milliGRAM(s) Oral daily  predniSONE   Tablet 5 milliGRAM(s) Oral daily  sodium zirconium cyclosilicate 10 Gram(s) Oral every 8 hours  tacrolimus ER Tablet (ENVARSUS XR) 7 milliGRAM(s) Oral <User Schedule>  tamsulosin 0.4 milliGRAM(s) Oral at bedtime  trimethoprim   80 mG/sulfamethoxazole 400 mG 1 Tablet(s) Oral daily    MEDICATIONS  (PRN):      CAPILLARY BLOOD GLUCOSE      POCT Blood Glucose.: 199 mg/dL (2021 13:24)  POCT Blood Glucose.: 119 mg/dL (2021 08:48)  POCT Blood Glucose.: 83 mg/dL (2021 02:18)  POCT Blood Glucose.: 116 mg/dL (2021 00:01)  POCT Blood Glucose.: 82 mg/dL (2021 23:09)    I&O's Summary      PHYSICAL EXAM:  Vital Signs Last 24 Hrs  T(C): 36.6 (2021 12:19), Max: 36.8 (2021 17:57)  T(F): 97.8 (2021 12:19), Max: 98.2 (2021 17:57)  HR: 67 (2021 12:19) (60 - 70)  BP: 117/67 (2021 12:19) (117/67 - 156/84)  BP(mean): --  RR: 18 (2021 12:19) (18 - 20)  SpO2: 97% (2021 12:19) (97% - 100%)    CONSTITUTIONAL: NAD, well-developed  RESPIRATORY: Normal respiratory effort; lungs are clear to auscultation bilaterally  CARDIOVASCULAR: Regular rate and rhythm, normal S1 and S2, no murmur/rub/gallop; No lower extremity edema; Peripheral pulses are 2+ bilaterally  ABDOMEN: Nontender to palpation, normoactive bowel sounds, no rebound/guarding; No hepatosplenomegaly  MUSCLOSKELETAL: no clubbing or cyanosis of digits; no joint swelling or tenderness to palpation  PSYCH: A+O to person, place, and time; affect appropriate    LABS:                        10.8   6.29  )-----------( 153      ( 2021 22:01 )             34.0     06-02    136  |  106  |  60<H>  ----------------------------<  84  5.5<H>   |  18<L>  |  5.21<H>    Ca    10.7<H>      2021 06:27  Phos  5.1     06-  Mg     1.6     06-    TPro  7.6  /  Alb  4.3  /  TBili  0.2  /  DBili  x   /  AST  18  /  ALT  12  /  AlkPhos  76  06-          Urinalysis Basic - ( 2021 23:37 )    Color: Yellow / Appearance: Turbid / S.018 / pH: x  Gluc: x / Ketone: Negative  / Bili: Negative / Urobili: Negative   Blood: x / Protein: 100 mg/dL / Nitrite: Positive   Leuk Esterase: Large / RBC: 30 /hpf /  /HPF   Sq Epi: x / Non Sq Epi: 1 /hpf / Bacteria: Negative          RADIOLOGY & ADDITIONAL TESTS:  Results Reviewed:   Imaging Personally Reviewed:  Electrocardiogram Personally Reviewed:    COORDINATION OF CARE:  Care Discussed with Consultants/Other Providers [Y/N]:  Prior or Outpatient Records Reviewed [Y/N]:

## 2021-06-03 LAB
-  AMIKACIN: SIGNIFICANT CHANGE UP
-  AZTREONAM: SIGNIFICANT CHANGE UP
-  CEFEPIME: SIGNIFICANT CHANGE UP
-  CEFTAZIDIME: SIGNIFICANT CHANGE UP
-  CIPROFLOXACIN: SIGNIFICANT CHANGE UP
-  GENTAMICIN: SIGNIFICANT CHANGE UP
-  IMIPENEM: SIGNIFICANT CHANGE UP
-  LEVOFLOXACIN: SIGNIFICANT CHANGE UP
-  MEROPENEM: SIGNIFICANT CHANGE UP
-  PIPERACILLIN/TAZOBACTAM: SIGNIFICANT CHANGE UP
-  TOBRAMYCIN: SIGNIFICANT CHANGE UP
ANION GAP SERPL CALC-SCNC: 12 MMOL/L — SIGNIFICANT CHANGE UP (ref 5–17)
BUN SERPL-MCNC: 56 MG/DL — HIGH (ref 7–23)
CALCIUM SERPL-MCNC: 11.1 MG/DL — HIGH (ref 8.4–10.5)
CHLORIDE SERPL-SCNC: 106 MMOL/L — SIGNIFICANT CHANGE UP (ref 96–108)
CO2 SERPL-SCNC: 18 MMOL/L — LOW (ref 22–31)
COVID-19 SPIKE DOMAIN AB INTERP: POSITIVE
COVID-19 SPIKE DOMAIN ANTIBODY RESULT: >250 U/ML — HIGH
CREAT SERPL-MCNC: 4.52 MG/DL — HIGH (ref 0.5–1.3)
CULTURE RESULTS: SIGNIFICANT CHANGE UP
GLUCOSE BLDC GLUCOMTR-MCNC: 114 MG/DL — HIGH (ref 70–99)
GLUCOSE BLDC GLUCOMTR-MCNC: 154 MG/DL — HIGH (ref 70–99)
GLUCOSE BLDC GLUCOMTR-MCNC: 168 MG/DL — HIGH (ref 70–99)
GLUCOSE BLDC GLUCOMTR-MCNC: 230 MG/DL — HIGH (ref 70–99)
GLUCOSE SERPL-MCNC: 101 MG/DL — HIGH (ref 70–99)
METHOD TYPE: SIGNIFICANT CHANGE UP
ORGANISM # SPEC MICROSCOPIC CNT: SIGNIFICANT CHANGE UP
ORGANISM # SPEC MICROSCOPIC CNT: SIGNIFICANT CHANGE UP
POTASSIUM SERPL-MCNC: 5.4 MMOL/L — HIGH (ref 3.5–5.3)
POTASSIUM SERPL-SCNC: 5.4 MMOL/L — HIGH (ref 3.5–5.3)
PSA FREE FLD-MCNC: 0.03 NG/ML — SIGNIFICANT CHANGE UP
PSA FREE FLD-MCNC: 11 % — SIGNIFICANT CHANGE UP
PSA SERPL-MCNC: 0.25 NG/ML — SIGNIFICANT CHANGE UP (ref 0–4)
SARS-COV-2 IGG+IGM SERPL QL IA: >250 U/ML — HIGH
SARS-COV-2 IGG+IGM SERPL QL IA: POSITIVE
SODIUM SERPL-SCNC: 136 MMOL/L — SIGNIFICANT CHANGE UP (ref 135–145)
SPECIMEN SOURCE: SIGNIFICANT CHANGE UP
TACROLIMUS SERPL-MCNC: 6.8 NG/ML — SIGNIFICANT CHANGE UP

## 2021-06-03 PROCEDURE — 99232 SBSQ HOSP IP/OBS MODERATE 35: CPT

## 2021-06-03 PROCEDURE — 99233 SBSQ HOSP IP/OBS HIGH 50: CPT

## 2021-06-03 PROCEDURE — 99232 SBSQ HOSP IP/OBS MODERATE 35: CPT | Mod: GC

## 2021-06-03 RX ADMIN — Medication 100 MILLIGRAM(S): at 12:15

## 2021-06-03 RX ADMIN — TAMSULOSIN HYDROCHLORIDE 0.4 MILLIGRAM(S): 0.4 CAPSULE ORAL at 21:47

## 2021-06-03 RX ADMIN — Medication 100 MILLIGRAM(S): at 07:58

## 2021-06-03 RX ADMIN — HEPARIN SODIUM 5000 UNIT(S): 5000 INJECTION INTRAVENOUS; SUBCUTANEOUS at 13:27

## 2021-06-03 RX ADMIN — Medication 30 MILLILITER(S): at 07:57

## 2021-06-03 RX ADMIN — Medication 2: at 13:12

## 2021-06-03 RX ADMIN — TACROLIMUS 7 MILLIGRAM(S): 5 CAPSULE ORAL at 09:24

## 2021-06-03 RX ADMIN — MAGNESIUM OXIDE 400 MG ORAL TABLET 400 MILLIGRAM(S): 241.3 TABLET ORAL at 12:15

## 2021-06-03 RX ADMIN — HEPARIN SODIUM 5000 UNIT(S): 5000 INJECTION INTRAVENOUS; SUBCUTANEOUS at 21:47

## 2021-06-03 RX ADMIN — Medication 5 MILLIGRAM(S): at 07:58

## 2021-06-03 RX ADMIN — Medication 1: at 17:17

## 2021-06-03 RX ADMIN — Medication 30 MILLILITER(S): at 17:18

## 2021-06-03 RX ADMIN — MYCOPHENOLATE MOFETIL 500 MILLIGRAM(S): 250 CAPSULE ORAL at 17:18

## 2021-06-03 RX ADMIN — MYCOPHENOLATE MOFETIL 500 MILLIGRAM(S): 250 CAPSULE ORAL at 07:59

## 2021-06-03 RX ADMIN — PIPERACILLIN AND TAZOBACTAM 25 GRAM(S): 4; .5 INJECTION, POWDER, LYOPHILIZED, FOR SOLUTION INTRAVENOUS at 10:47

## 2021-06-03 RX ADMIN — Medication 60 MILLIGRAM(S): at 07:58

## 2021-06-03 RX ADMIN — HEPARIN SODIUM 5000 UNIT(S): 5000 INJECTION INTRAVENOUS; SUBCUTANEOUS at 07:58

## 2021-06-03 RX ADMIN — SODIUM ZIRCONIUM CYCLOSILICATE 10 GRAM(S): 10 POWDER, FOR SUSPENSION ORAL at 03:27

## 2021-06-03 RX ADMIN — Medication 1 TABLET(S): at 12:14

## 2021-06-03 RX ADMIN — PIPERACILLIN AND TAZOBACTAM 25 GRAM(S): 4; .5 INJECTION, POWDER, LYOPHILIZED, FOR SOLUTION INTRAVENOUS at 21:47

## 2021-06-03 NOTE — CHART NOTE - NSCHARTNOTEFT_GEN_A_CORE
Called for ureteral stent management in setting of pseudomonas UTI.  The patient is a transplant patient who had the stent placed by IR per transplant surgery.  Will defer to transplant surgery for stent management.

## 2021-06-03 NOTE — PROGRESS NOTE ADULT - SUBJECTIVE AND OBJECTIVE BOX
[facility] DIVISION OF KIDNEY DISEASES AND HYPERTENSION -- FOLLOW UP NOTE  --------------------------------------------------------------------------------  Luis F Grullon   Nephrology Fellow  Pager NS: [phone]/ [facility]: [phone]  (After 5 pm or on weekends please page the on-call fellow, can view the schedule on [employer]. Login is marimar zamudio, schedule under [facility] medicine, psych, derm.      Patient is a 70y old  Male who presents with a chief complaint of Sent in for Hyperkalemia and worsening renal function (02 Jun 2021 15:01)      24 hour events/subjective: Patient seen and examined at the bedside. Vital signs, labs, medications reviewed. Velez placed overnight for urinary retention, switched to zosyn for UTI. Scr improving. K 5.4. Non-oliguric 1.5L        PAST HISTORY  --------------------------------------------------------------------------------  No significant changes to PMH, PSH, FHx, SHx, unless otherwise noted    ALLERGIES & MEDICATIONS  --------------------------------------------------------------------------------  Allergies    No Known Allergies    Intolerances      Standing Inpatient Medications  allopurinol 100 milliGRAM(s) Oral daily  citric acid/sodium citrate Solution 30 milliLiter(s) Oral two times a day  dextrose 40% Gel 15 Gram(s) Oral once  dextrose 5%. 1000 milliLiter(s) IV Continuous <Continuous>  dextrose 5%. 1000 milliLiter(s) IV Continuous <Continuous>  dextrose 50% Injectable 25 Gram(s) IV Push once  dextrose 50% Injectable 12.5 Gram(s) IV Push once  dextrose 50% Injectable 25 Gram(s) IV Push once  glucagon  Injectable 1 milliGRAM(s) IntraMuscular once  heparin   Injectable 5000 Unit(s) SubCutaneous every 8 hours  insulin lispro (ADMELOG) corrective regimen sliding scale   SubCutaneous three times a day before meals  insulin lispro (ADMELOG) corrective regimen sliding scale   SubCutaneous at bedtime  lactated ringers. 1000 milliLiter(s) IV Continuous <Continuous>  magnesium oxide 400 milliGRAM(s) Oral daily  metoprolol succinate  milliGRAM(s) Oral daily  mycophenolate mofetil 500 milliGRAM(s) Oral two times a day  NIFEdipine XL 60 milliGRAM(s) Oral daily  piperacillin/tazobactam IVPB.. 3.375 Gram(s) IV Intermittent every 12 hours  predniSONE   Tablet 5 milliGRAM(s) Oral daily  sodium zirconium cyclosilicate 10 Gram(s) Oral every 8 hours  tacrolimus ER Tablet (ENVARSUS XR) 7 milliGRAM(s) Oral <User Schedule>  tamsulosin 0.4 milliGRAM(s) Oral at bedtime  trimethoprim   80 mG/sulfamethoxazole 400 mG 1 Tablet(s) Oral daily    PRN Inpatient Medications      REVIEW OF SYSTEMS  --------------------------------------------------------------------------------  Gen: No fevers/chills  Skin: No rashes  Head/Eyes/Ears: Normal hearing, no difficulty seeing  Respiratory: No dyspnea, cough  CV: No chest pain  GI: No abdominal pain, diarrhea  : No dysuria, hematuria  MSK: No  edema      >>> <<<    VITALS/PHYSICAL EXAM  --------------------------------------------------------------------------------  T(C): 36.5 (06-03-21 @ 05:30), Max: 36.6 (06-02-21 @ 12:19)  HR: 66 (06-03-21 @ 05:30) (66 - 68)  BP: 124/62 (06-03-21 @ 05:30) (117/67 - 150/71)  RR: 18 (06-03-21 @ 05:30) (18 - 18)  SpO2: 99% (06-03-21 @ 05:30) (97% - 100%)  Wt(kg): --  Height (cm): 179.1 (06-01-21 @ 17:57)  Weight (kg): 95.3 (06-01-21 @ 17:57)  BMI (kg/m2): 29.7 (06-01-21 @ 17:57)  BSA (m2): 2.14 (06-01-21 @ 17:57)      06-02-21 @ 07:01  -  06-03-21 @ 07:00  --------------------------------------------------------  IN: 280 mL / OUT: 1500 mL / NET: -1220 mL      Physical Exam:    	Gen: NAD  	HEENT: Anicteric  	Pulm: CTA B/L  	CV: S1S2  	Abd: Soft, +BS               Allograft: No TTP              : + Velez with clear urine  	MSK: No LE edema B/L  	Neuro: Awake  	Skin: Warm and dry  	Vascular access:      LABS/STUDIES  --------------------------------------------------------------------------------              11.5   7.89  >-----------<  143      [06-02-21 @ 14:22]              36.8     136  |  106  |  56  ----------------------------<  101      [06-03-21 @ 06:46]  5.4   |  18  |  4.52        Ca     11.1     [06-03-21 @ 06:46]      Mg     1.6     [06-01-21 @ 22:01]      Phos  5.1     [06-01-21 @ 22:01]    TPro  7.8  /  Alb  4.2  /  TBili  0.2  /  DBili  x   /  AST  22  /  ALT  14  /  AlkPhos  87  [06-02-21 @ 14:23]          Creatinine Trend:  SCr 4.52 [06-03 @ 06:46]  SCr 4.88 [06-02 @ 14:23]  SCr 5.21 [06-02 @ 06:27]  SCr 5.31 [06-02 @ 01:23]  SCr 5.53 [06-01 @ 22:01]    Urinalysis - [06-01-21 @ 23:37]      Color Yellow / Appearance Turbid / SG 1.018 / pH 6.0      Gluc Negative / Ketone Negative  / Bili Negative / Urobili Negative       Blood Large / Protein 100 mg/dL / Leuk Est Large / Nitrite Positive      RBC 30 /  / Hyaline 4 / Gran  / Sq Epi  / Non Sq Epi 1 / Bacteria Negative    Urine Creatinine 169      [06-02-21 @ 06:38]  Urine Protein 95      [06-02-21 @ 06:38]  Urine Sodium 65      [06-02-21 @ 06:38]  Urine Urea Nitrogen 560      [06-02-21 @ 08:31]    Iron 75, TIBC 278, %sat 27      [06-02-21 @ 16:51]  Ferritin 450      [06-02-21 @ 17:06]  PTH -- (Ca --)      [06-02-21 @ 17:06]   15  HbA1c 6.0      [08-09-19 @ 23:48]       [facility] DIVISION OF KIDNEY DISEASES AND HYPERTENSION -- FOLLOW UP NOTE  --------------------------------------------------------------------------------  Luis F Grullon   Nephrology Fellow  Pager NS: [phone]/ [facility]: [phone]  (After 5 pm or on weekends please page the on-call fellow, can view the schedule on [employer]. Login is marimar zamudio, schedule under [facility] medicine, psych, derm.      Patient is a 70y old  Male who presents with a chief complaint of Sent in for Hyperkalemia and worsening renal function (02 Jun 2021 15:01)      24 hour events/subjective: Patient seen and examined at the bedside. Vital signs, labs, medications reviewed. Velez placed overnight for urinary retention, switched to zosyn for UTI. Scr improving. K 5.4. Non-oliguric 1.5L        PAST HISTORY  --------------------------------------------------------------------------------  No significant changes to PMH, PSH, FHx, SHx, unless otherwise noted    ALLERGIES & MEDICATIONS  --------------------------------------------------------------------------------  Allergies    No Known Allergies    Intolerances      Standing Inpatient Medications  allopurinol 100 milliGRAM(s) Oral daily  citric acid/sodium citrate Solution 30 milliLiter(s) Oral two times a day  dextrose 40% Gel 15 Gram(s) Oral once  dextrose 5%. 1000 milliLiter(s) IV Continuous <Continuous>  dextrose 5%. 1000 milliLiter(s) IV Continuous <Continuous>  dextrose 50% Injectable 25 Gram(s) IV Push once  dextrose 50% Injectable 12.5 Gram(s) IV Push once  dextrose 50% Injectable 25 Gram(s) IV Push once  glucagon  Injectable 1 milliGRAM(s) IntraMuscular once  heparin   Injectable 5000 Unit(s) SubCutaneous every 8 hours  insulin lispro (ADMELOG) corrective regimen sliding scale   SubCutaneous three times a day before meals  insulin lispro (ADMELOG) corrective regimen sliding scale   SubCutaneous at bedtime  lactated ringers. 1000 milliLiter(s) IV Continuous <Continuous>  magnesium oxide 400 milliGRAM(s) Oral daily  metoprolol succinate  milliGRAM(s) Oral daily  mycophenolate mofetil 500 milliGRAM(s) Oral two times a day  NIFEdipine XL 60 milliGRAM(s) Oral daily  piperacillin/tazobactam IVPB.. 3.375 Gram(s) IV Intermittent every 12 hours  predniSONE   Tablet 5 milliGRAM(s) Oral daily  sodium zirconium cyclosilicate 10 Gram(s) Oral every 8 hours  tacrolimus ER Tablet (ENVARSUS XR) 7 milliGRAM(s) Oral <User Schedule>  tamsulosin 0.4 milliGRAM(s) Oral at bedtime  trimethoprim   80 mG/sulfamethoxazole 400 mG 1 Tablet(s) Oral daily    PRN Inpatient Medications      REVIEW OF SYSTEMS  --------------------------------------------------------------------------------  Gen: No fevers/chills  Skin: No rashes  Head/Eyes/Ears: Normal hearing, no difficulty seeing  Respiratory: No dyspnea, cough  CV: No chest pain  GI: No abdominal pain, diarrhea  : No dysuria, hematuria  MSK: No  edema      >>> <<<    VITALS/PHYSICAL EXAM  --------------------------------------------------------------------------------  T(C): 36.5 (06-03-21 @ 05:30), Max: 36.6 (06-02-21 @ 12:19)  HR: 66 (06-03-21 @ 05:30) (66 - 68)  BP: 124/62 (06-03-21 @ 05:30) (117/67 - 150/71)  RR: 18 (06-03-21 @ 05:30) (18 - 18)  SpO2: 99% (06-03-21 @ 05:30) (97% - 100%)  Wt(kg): --  Height (cm): 179.1 (06-01-21 @ 17:57)  Weight (kg): 95.3 (06-01-21 @ 17:57)  BMI (kg/m2): 29.7 (06-01-21 @ 17:57)  BSA (m2): 2.14 (06-01-21 @ 17:57)      06-02-21 @ 07:01  -  06-03-21 @ 07:00  --------------------------------------------------------  IN: 280 mL / OUT: 1500 mL / NET: -1220 mL      Physical Exam:    	Gen: NAD  	HEENT: Anicteric  	Pulm: CTA B/L  	CV: S1S2  	Abd: Soft, +BS               Allograft: No TTP              : + Velez with clear urine  	MSK: No LE edema B/L  	Neuro: Awake  	Skin: Warm and dry  	Vascular access: UE AVF      LABS/STUDIES  --------------------------------------------------------------------------------              11.5   7.89  >-----------<  143      [06-02-21 @ 14:22]              36.8     136  |  106  |  56  ----------------------------<  101      [06-03-21 @ 06:46]  5.4   |  18  |  4.52        Ca     11.1     [06-03-21 @ 06:46]      Mg     1.6     [06-01-21 @ 22:01]      Phos  5.1     [06-01-21 @ 22:01]    TPro  7.8  /  Alb  4.2  /  TBili  0.2  /  DBili  x   /  AST  22  /  ALT  14  /  AlkPhos  87  [06-02-21 @ 14:23]          Creatinine Trend:  SCr 4.52 [06-03 @ 06:46]  SCr 4.88 [06-02 @ 14:23]  SCr 5.21 [06-02 @ 06:27]  SCr 5.31 [06-02 @ 01:23]  SCr 5.53 [06-01 @ 22:01]    Urinalysis - [06-01-21 @ 23:37]      Color Yellow / Appearance Turbid / SG 1.018 / pH 6.0      Gluc Negative / Ketone Negative  / Bili Negative / Urobili Negative       Blood Large / Protein 100 mg/dL / Leuk Est Large / Nitrite Positive      RBC 30 /  / Hyaline 4 / Gran  / Sq Epi  / Non Sq Epi 1 / Bacteria Negative    Urine Creatinine 169      [06-02-21 @ 06:38]  Urine Protein 95      [06-02-21 @ 06:38]  Urine Sodium 65      [06-02-21 @ 06:38]  Urine Urea Nitrogen 560      [06-02-21 @ 08:31]    Iron 75, TIBC 278, %sat 27      [06-02-21 @ 16:51]  Ferritin 450      [06-02-21 @ 17:06]  PTH -- (Ca --)      [06-02-21 @ 17:06]   15  HbA1c 6.0      [08-09-19 @ 23:48]

## 2021-06-03 NOTE — PROGRESS NOTE ADULT - PROBLEM SELECTOR PLAN 5
Possibly due to renal dysfunction, but given prostate cancer need to consider bony mets as well  -normal PTH    -  monitor level, currently seems asymptomatic

## 2021-06-03 NOTE — PROGRESS NOTE ADULT - SUBJECTIVE AND OBJECTIVE BOX
PROGRESS NOTE:   Diana Moore DO  Hospitalist  Pager [phone]  After 5pm/weekends or if no answer ext: [phone]      Patient is a 70y old  Male who presents with a chief complaint of Sent in for Hyperkalemia and worsening renal function (2021 09:45)       Overnight: had urinary retention overnight so urology placed armenta    ADDITIONAL REVIEW OF SYSTEMS:  no fever or chills  no n/v/d    MEDICATIONS  (STANDING):  allopurinol 100 milliGRAM(s) Oral daily  citric acid/sodium citrate Solution 30 milliLiter(s) Oral two times a day  dextrose 40% Gel 15 Gram(s) Oral once  dextrose 5%. 1000 milliLiter(s) (50 mL/Hr) IV Continuous <Continuous>  dextrose 5%. 1000 milliLiter(s) (100 mL/Hr) IV Continuous <Continuous>  dextrose 50% Injectable 25 Gram(s) IV Push once  dextrose 50% Injectable 12.5 Gram(s) IV Push once  dextrose 50% Injectable 25 Gram(s) IV Push once  glucagon  Injectable 1 milliGRAM(s) IntraMuscular once  heparin   Injectable 5000 Unit(s) SubCutaneous every 8 hours  insulin lispro (ADMELOG) corrective regimen sliding scale   SubCutaneous three times a day before meals  insulin lispro (ADMELOG) corrective regimen sliding scale   SubCutaneous at bedtime  lactated ringers. 1000 milliLiter(s) (100 mL/Hr) IV Continuous <Continuous>  magnesium oxide 400 milliGRAM(s) Oral daily  metoprolol succinate  milliGRAM(s) Oral daily  mycophenolate mofetil 500 milliGRAM(s) Oral two times a day  NIFEdipine XL 60 milliGRAM(s) Oral daily  piperacillin/tazobactam IVPB.. 3.375 Gram(s) IV Intermittent every 12 hours  predniSONE   Tablet 5 milliGRAM(s) Oral daily  tacrolimus ER Tablet (ENVARSUS XR) 7 milliGRAM(s) Oral <User Schedule>  tamsulosin 0.4 milliGRAM(s) Oral at bedtime  trimethoprim   80 mG/sulfamethoxazole 400 mG 1 Tablet(s) Oral daily    MEDICATIONS  (PRN):      CAPILLARY BLOOD GLUCOSE      POCT Blood Glucose.: 114 mg/dL (2021 08:43)  POCT Blood Glucose.: 100 mg/dL (2021 22:14)  POCT Blood Glucose.: 171 mg/dL (2021 17:36)  POCT Blood Glucose.: 138 mg/dL (2021 17:05)  POCT Blood Glucose.: 199 mg/dL (2021 13:24)    I&O's Summary    2021 07:01  -  2021 07:00  --------------------------------------------------------  IN: 280 mL / OUT: 1500 mL / NET: -1220 mL    2021 07:01  -  2021 11:25  --------------------------------------------------------  IN: 120 mL / OUT: 0 mL / NET: 120 mL        PHYSICAL EXAM:  Vital Signs Last 24 Hrs  T(C): 36.5 (2021 05:30), Max: 36.6 (2021 12:19)  T(F): 97.7 (2021 05:30), Max: 97.8 (2021 12:19)  HR: 66 (2021 05:30) (66 - 68)  BP: 124/62 (2021 05:30) (117/67 - 150/71)  BP(mean): --  RR: 18 (2021 05:30) (18 - 18)  SpO2: 99% (2021 05:30) (97% - 100%)    CONSTITUTIONAL: NAD, well-developed  RESPIRATORY: Normal respiratory effort; lungs are clear to auscultation bilaterally  CARDIOVASCULAR: Regular rate and rhythm, normal S1 and S2, no murmur/rub/gallop; No lower extremity edema; Peripheral pulses are 2+ bilaterally  ABDOMEN: Nontender to palpation, normoactive bowel sounds, no rebound/guarding; No hepatosplenomegaly  MUSCLOSKELETAL: no clubbing or cyanosis of digits; no joint swelling or tenderness to palpation  PSYCH: A+O to person, place, and time; affect appropriate    LABS:                        11.5   7.89  )-----------( 143      ( 2021 14:22 )             36.8     06-    136  |  106  |  56<H>  ----------------------------<  101<H>  5.4<H>   |  18<L>  |  4.52<H>    Ca    11.1<H>      2021 06:46  Phos  5.1       Mg     1.6         TPro  7.8  /  Alb  4.2  /  TBili  0.2  /  DBili  x   /  AST  22  /  ALT  14  /  AlkPhos  87            Urinalysis Basic - ( 2021 23:37 )    Color: Yellow / Appearance: Turbid / S.018 / pH: x  Gluc: x / Ketone: Negative  / Bili: Negative / Urobili: Negative   Blood: x / Protein: 100 mg/dL / Nitrite: Positive   Leuk Esterase: Large / RBC: 30 /hpf /  /HPF   Sq Epi: x / Non Sq Epi: 1 /hpf / Bacteria: Negative        Culture - Blood (collected 2021 05:18)  Source: .Blood Blood-Venous  Preliminary Report (2021 06:01):    No growth to date.    Culture - Blood (collected 2021 05:18)  Source: .Blood Blood-Peripheral  Preliminary Report (2021 06:01):    No growth to date.    Culture - Urine (collected 2021 03:48)  Source: .Urine Clean Catch (Midstream)  Preliminary Report (2021 01:50):    >100,000 CFU/ml Pseudomonas aeruginosa        RADIOLOGY & ADDITIONAL TESTS:  Results Reviewed:   Imaging Personally Reviewed:  Electrocardiogram Personally Reviewed:    COORDINATION OF CARE:  Care Discussed with Consultants/Other Providers [Y/N]:  Prior or Outpatient Records Reviewed [Y/N]:

## 2021-06-03 NOTE — PROGRESS NOTE ADULT - ASSESSMENT
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 He was recently treated for two weeks with ciprofloxacin for pseudomonas urinary tract infection (known to be colonized). Patient denies dysuria or polyuria.    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  - Right iliac fossa transplant kidney with ureteral stent extending from the right renal pelvis into the bladder.  - Findings hydronephrosis and urothelial thickening of the renal transplant.  - Follow up CMV PCR viral load    #CHELA and hyperkalemia:  - Trend BMP    #Prostate cancer:  - On Orgovyx for prostate cancer. He is pending radiation therapy after finishing his oral therapy.    Clemente Oleary MD, PGY4   ID fellow  Pager: [phone]  After 5pm/weekends call [phone]

## 2021-06-03 NOTE — PROGRESS NOTE ADULT - ASSESSMENT
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 Ucx positive for pseudomonas.  - S/p armenta and zosyn  - F/u bcx from admission  - Renally dose medications to eGFR<10  - Lokelma 10g TID x 1 day for hyperkalemia  - Check BK & CMV PCR, will check a DSA      #DDRT  Pts aaron creatinine 1.9 but had CHELA with pyelonephritis. Has had multiple episodes of CHELA. Pt in process of relisting but on hold due to prostate cancer. Cell free DNA 0.2% in October 2020. Scr was 3.2-3.4mg/dl on last outpt visit in early may 2021. Tx'ed with cipro for pseudomonas UTI  - Admitted with Cr of 5.5mg/dl, improving to 4.5mg/dl today  - Renal US with moderate hydronephrosis and bladder wall thickening  - Ucx +, on Zosyn  - C/w Envarsus 7mg QD, check a tacro trough 30 mins prior to giving the dose. C/w cellcept 500mg BID, prednisone 5mg QD  - C/w bactrim PPX, 1 single strength tablet QD  - Unable to tolerate full dose cellcept due to leukopenia and diarrhea    #Immunosuppression  - Non-toxic appearing  - C/w Envarsus 7mg QD, check a tacro trough 30 mins prior to giving the dose. 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 Ucx positive for pseudomonas.  - S/p armenta and zosyn. Please have urology see the patient regarding the ureter stent and hydronephrosis with UTI  - F/u bcx from admission  - Renally dose medications to eGFR<10  - Lokelma 10g TID x 1 day for hyperkalemia      #DDRT  Pts aaron creatinine 1.9 but had CHELA with pyelonephritis. Has had multiple episodes of CHELA. Pt in process of relisting but on hold due to prostate cancer. Cell free DNA 0.2% in October 2020. Scr was 3.2-3.4mg/dl on last outpt visit in early may 2021. Tx'ed with cipro for pseudomonas UTI  - Admitted with Cr of 5.5mg/dl, improving to 4.5mg/dl today  - Renal US with moderate hydronephrosis and bladder wall thickening  - Ucx +, on Zosyn  - C/w Envarsus 7mg QD, check a tacro trough 30 mins prior to giving the dose. C/w cellcept 500mg BID, prednisone 5mg QD  - C/w bactrim PPX, 1 single strength tablet QD  - Unable to tolerate full dose cellcept due to leukopenia and diarrhea    #Immunosuppression  - Non-toxic appearing  - C/w Envarsus 7mg QD, check a tacro trough 30 mins prior to giving the dose. C/w cellcept 500mg BID, prednisone 5mg QD  - Goal trough 4-6      #Hyperkalemia  - 2/2 CHELA  - lokelma 10g TID x 1 day and then can do daily lokelma 10g  - Low K diet  - c/w bicitra, changed to 30ml BID

## 2021-06-03 NOTE — PROGRESS NOTE ADULT - PROBLEM SELECTOR PLAN 1
-Transplant Nephrology consulted and will continue with Envarsus, Prednisone and Cellcept  - urine cx showing pseudomonas again- concern for seeding of the stent as he has failed outpt treatment and had several MDR Pseudomonas infections- need urology input  - Check BMP this afternoon and if Cr and K still improving can DC tele  -Renal Transplant following  -May require biopsy pending clinical course but now improving  -PVR elevated so urology consulted- need to recall to evaluate stent with persistent hydro and infection

## 2021-06-03 NOTE — PROGRESS NOTE ADULT - SUBJECTIVE AND OBJECTIVE BOX
Follow Up: PsA UTI    Interval History/ROS:Patient is a 70y old  Male who presents with a chief complaint of Sent in for Hyperkalemia and worsening renal function (2021 07:22)  - Started on Zosyn last night  - Velez placed yesterday due to urine retention > 400 cc    REVIEW OF SYSTEMS  [  ] ROS unobtainable because:    [ V ] All other systems negative except as noted below    Constitutional:  [ ] fever [ ] chills  [ ] weight loss  [ ]night sweat  [ ]poor appetite/PO intake [ ]fatigue   Skin:  [ ] rash [ ] phlebitis	  Eyes: [ ] icterus [ ] pain  [ ] discharge	  ENMT: [ ] sore throat  [ ] thrush [ ] ulcers [ ] exudates [ ]anosmia  Respiratory: [ ] dyspnea [ ] hemoptysis [ ] cough [ ] sputum	  Cardiovascular:  [ ] chest pain [ ] palpitations [ ] edema	  Gastrointestinal:  [ ] nausea [ ] vomiting [ ] diarrhea [ ] constipation [ ] pain	  Genitourinary:  [ ] dysuria [ ] frequency [ ] hematuria [ ] discharge [ ] flank pain  [ ] incontinence  Musculoskeletal:  [ ] myalgias [ ] arthralgias [ ] arthritis  [ ] back pain  Neurological:  [ ] headache [ ] weakness [ ] seizures  [ ] confusion/altered mental status    Allergies  No Known Allergies    ANTIMICROBIALS:    piperacillin/tazobactam IVPB.. 3.375 every 12 hours  trimethoprim   80 mG/sulfamethoxazole 400 mG 1 daily    OTHER MEDS: MEDICATIONS  (STANDING):  allopurinol 100 daily  dextrose 40% Gel 15 once  dextrose 50% Injectable 25 once  dextrose 50% Injectable 12.5 once  dextrose 50% Injectable 25 once  glucagon  Injectable 1 once  heparin   Injectable 5000 every 8 hours  insulin lispro (ADMELOG) corrective regimen sliding scale  three times a day before meals  insulin lispro (ADMELOG) corrective regimen sliding scale  at bedtime  metoprolol succinate  daily  mycophenolate mofetil 500 two times a day  NIFEdipine XL 60 daily  predniSONE   Tablet 5 daily  tacrolimus ER Tablet (ENVARSUS XR) 7 <User Schedule>  tamsulosin 0.4 at bedtime      Vital Signs Last 24 Hrs  T(F): 97.7 (21 @ 05:30), Max: 98.2 (21 @ 17:57)    Vital Signs Last 24 Hrs  HR: 66 (21 @ 05:30) (66 - 68)  BP: 124/62 (21 @ 05:30) (117/67 - 150/71)  RR: 18 (21 @ 05:30)  SpO2: 99% (21 @ 05:30) (97% - 100%)  Wt(kg): --    EXAM:  GA: NAD  HEENT: oral cavity no lesion  CV: nl S1/S2, no RMG  Lungs: CTAB  Abd: BS+, soft, nontender, no rebounding pain  Ext: no edema  Skin:  IV: no phlebitis    Labs:                        11.5   7.89  )-----------( 143      ( 2021 14:22 )             36.8         136  |  106  |  56<H>  ----------------------------<  101<H>  5.4<H>   |  18<L>  |  4.52<H>    Ca    11.1<H>      2021 06:46  Phos  5.1       Mg     1.6         TPro  7.8  /  Alb  4.2  /  TBili  0.2  /  DBili  x   /  AST  22  /  ALT  14  /  AlkPhos  87        WBC Trend:  WBC Count: 7.89 (21 @ 14:22)  WBC Count: 6.29 (21 @ 22:01)      Creatine Trend:  Creatinine, Serum: 4.52 ()  Creatinine, Serum: 4.88 ()  Creatinine, Serum: 5.21 ()  Creatinine, Serum: 5.31 ()      Liver Biochemical Testing Trend:  Alanine Aminotransferase (ALT/SGPT): 14 ()  Alanine Aminotransferase (ALT/SGPT): 12 ()  Alanine Aminotransferase (ALT/SGPT): 30 (07-10)  Alanine Aminotransferase (ALT/SGPT): 32 ()  Alanine Aminotransferase (ALT/SGPT): 34 ()  Aspartate Aminotransferase (AST/SGOT): 22 (21 @ 14:23)  Aspartate Aminotransferase (AST/SGOT): 18 (21 @ 22:01)  Aspartate Aminotransferase (AST/SGOT): 29 (07-10-20 @ 05:56)  Aspartate Aminotransferase (AST/SGOT): 29 (20 @ 06:07)  Aspartate Aminotransferase (AST/SGOT): 38 (20 @ 06:07)  Bilirubin Total, Serum: 0.2 ()  Bilirubin Total, Serum: 0.2 ()  Bilirubin Total, Serum: 0.2 (07-10)  Bilirubin Total, Serum: 0.2 ()  Bilirubin Total, Serum: 0.2 ()      Trend LDH  18 @ 02:37  176      Urinalysis Basic - ( 2021 23:37 )  Color: Yellow / Appearance: Turbid / S.018 / pH: x  Gluc: x / Ketone: Negative  / Bili: Negative / Urobili: Negative   Blood: x / Protein: 100 mg/dL / Nitrite: Positive   Leuk Esterase: Large / RBC: 30 /hpf /  /HPF   Sq Epi: x / Non Sq Epi: 1 /hpf / Bacteria: Negative        MICROBIOLOGY:      Culture - Blood (collected 2021 05:18)  Source: .Blood Blood-Venous  Preliminary Report:    No growth to date.    Culture - Blood (collected 2021 05:18)  Source: .Blood Blood-Peripheral  Preliminary Report:    No growth to date.    Culture - Urine (collected 2021 03:48)  Source: .Urine Clean Catch (Midstream)  Preliminary Report:    >100,000 CFU/ml Pseudomonas aeruginosa    Culture - Blood (collected 2020 09:01)  Source: .Blood Blood-Peripheral  Final Report:    No Growth Final    Culture - Blood (collected 2020 14:50)  Source: .Blood Blood-Peripheral  Final Report:    No Growth Final    Culture - Urine (collected 2020 19:39)  Source: .Urine Clean Catch (Midstream)  Final Report:    10,000 - 49,000 CFU/mL Pseudomonas aeruginosa (Carbapenem Resistant)  Organism: Pseudomonas aeruginosa (Carbapenem Resistant)  Organism: Pseudomonas aeruginosa (Carbapenem Resistant)    Sensitivities:      -  Amikacin: S <=16      -  Aztreonam: R >16      -  Cefepime: S 8      -  Ceftazidime: S 4      -  Ciprofloxacin: S <=1      -  Gentamicin: S <=4      -  Imipenem: R 8      -  Levofloxacin: S <=2      -  Meropenem: R >8      -  Piperacillin/Tazobactam: S <=16      -  Tobramycin: S <=4      Method Type: AUTUMN    Culture - Blood (collected 2020 16:38)  Source: .Blood Blood-Peripheral  Final Report:    Growth in aerobic bottle: Pseudomonas aeruginosa    ***Blood Panel PCR results on this specimen are available    approximately 3 hours after the Gram stain result.***    Gram stain, PCR, and/or culture results may not always    correspond due to differencein methodologies.    ************************************************************    This PCR assay was performed using [employer].    The following targets are tested for: Enterococcus,    vancomycin resistant enterococci, Listeria monocytogenes,    coagulase negative staphylococci, S. aureus,    methicillin resistant S. aureus, Streptococcus agalactiae    (Group B), S. pneumoniae, S. pyogenes (Group A),    Acinetobacter baumannii, Enterobacter cloacae, E. coli,    Klebsiella oxytoca, K. pneumoniae, Proteus sp.,    Serratia marcescens, Haemophilus influenzae,    Neisseria meningitidis, Pseudomonas aeruginosa, Candida    albicans, C. glabrata, C krusei, C parapsilosis,    C. tropicalis and the KPC resistance gene.    "Due to technical problems, Proteus sp. will Not be reported as part of    the BCID panel until further notice"  Organism: Blood Culture PCR  Pseudomonas aeruginosa  Organism: Pseudomonas aeruginosa    Sensitivities:      -  Amikacin: S <=16      -  Aztreonam: R >16      -  Cefepime: S 8      -  Ceftazidime: S 8      -  Ciprofloxacin: I 0.5      -  Gentamicin: S <=2      -  Levofloxacin: R 2      -  Meropenem: S <=1      -  Piperacillin/Tazobactam: S 16      -  Tobramycin: S <=2      Method Type: AUTUMN  Organism: Blood Culture PCR    Sensitivities:      -  Pseudomonas aeruginosa: Detec      Method Type: PCR    Culture - Blood (collected 2020 16:38)  Source: .Blood Blood-Peripheral  Final Report:    Growth in aerobic bottle: Pseudomonas aeruginosa    See previous culture [id number]    Culture - Blood (collected 2020 09:04)  Source: .Blood Blood  Final Report:    No growth at 5 days.    Culture - Blood (collected 2020 09:04)  Source: .Blood Blood  Final Report:    No growth at 5 days.      COVID-19 PCR: NotDetec (21 @ 23:22)  COVID-19 PCR: NotDetec (21 @ 16:51)    COVID-19 [facility] Interp: Positive (21 @ 09:02)    Ferritin, Serum: 450 ()    Blood Gas Venous - Lactate: 0.6 ( @ 22:01)      RADIOLOGY:  imaging below personally reviewed     Follow Up: PsA UTI    Interval History/ROS:Patient is a 70y old  Male who presents with a chief complaint of Sent in for Hyperkalemia and worsening renal function (2021 07:22)  - Started on Zosyn last night  - Velez placed yesterday due to urine retention > 400 cc  - Feeling well today, no complaint, no dysuria    REVIEW OF SYSTEMS  [  ] ROS unobtainable because:    [ V ] All other systems negative except as noted below    Constitutional:  [ ] fever [ ] chills  [ ] weight loss  [ ]night sweat  [ ]poor appetite/PO intake [ ]fatigue   Skin:  [ ] rash [ ] phlebitis	  Eyes: [ ] icterus [ ] pain  [ ] discharge	  ENMT: [ ] sore throat  [ ] thrush [ ] ulcers [ ] exudates [ ]anosmia  Respiratory: [ ] dyspnea [ ] hemoptysis [ ] cough [ ] sputum	  Cardiovascular:  [ ] chest pain [ ] palpitations [ ] edema	  Gastrointestinal:  [ ] nausea [ ] vomiting [ ] diarrhea [ ] constipation [ ] pain	  Genitourinary:  [ ] dysuria [ ] frequency [ ] hematuria [ ] discharge [ ] flank pain  [ ] incontinence  Musculoskeletal:  [ ] myalgias [ ] arthralgias [ ] arthritis  [ ] back pain  Neurological:  [ ] headache [ ] weakness [ ] seizures  [ ] confusion/altered mental status    Allergies  No Known Allergies    ANTIMICROBIALS:    piperacillin/tazobactam IVPB.. 3.375 every 12 hours  trimethoprim   80 mG/sulfamethoxazole 400 mG 1 daily    OTHER MEDS: MEDICATIONS  (STANDING):  allopurinol 100 daily  dextrose 40% Gel 15 once  dextrose 50% Injectable 25 once  dextrose 50% Injectable 12.5 once  dextrose 50% Injectable 25 once  glucagon  Injectable 1 once  heparin   Injectable 5000 every 8 hours  insulin lispro (ADMELOG) corrective regimen sliding scale  three times a day before meals  insulin lispro (ADMELOG) corrective regimen sliding scale  at bedtime  metoprolol succinate  daily  mycophenolate mofetil 500 two times a day  NIFEdipine XL 60 daily  predniSONE   Tablet 5 daily  tacrolimus ER Tablet (ENVARSUS XR) 7 <User Schedule>  tamsulosin 0.4 at bedtime      Vital Signs Last 24 Hrs  T(F): 97.7 (21 @ 05:30), Max: 98.2 (21 @ 17:57)    Vital Signs Last 24 Hrs  HR: 66 (21 @ 05:30) (66 - 68)  BP: 124/62 (21 @ 05:30) (117/67 - 150/71)  RR: 18 (21 @ 05:30)  SpO2: 99% (21 @ 05:30) (97% - 100%)  Wt(kg): --    EXAM:  GA: NAD  HEENT: oral cavity no lesion  CV: nl S1/S2, no RMG  Lungs: CTAB  Abd: BS+, soft, nontender, no rebounding pain  Ext: no edema. Left forearm AVF thrill+, nontender  Skin: no rash  IV: no phlebitis  No bilateral CVA tenderness  Velez+    Labs:                        11.5   7.89  )-----------( 143      ( 2021 14:22 )             36.8         136  |  106  |  56<H>  ----------------------------<  101<H>  5.4<H>   |  18<L>  |  4.52<H>    Ca    11.1<H>      2021 06:46  Phos  5.1       Mg     1.6         TPro  7.8  /  Alb  4.2  /  TBili  0.2  /  DBili  x   /  AST  22  /  ALT  14  /  AlkPhos  87        WBC Trend:  WBC Count: 7.89 (21 @ 14:22)  WBC Count: 6.29 (21 @ 22:01)      Creatine Trend:  Creatinine, Serum: 4.52 ()  Creatinine, Serum: 4.88 ()  Creatinine, Serum: 5.21 ()  Creatinine, Serum: 5.31 ()      Liver Biochemical Testing Trend:  Alanine Aminotransferase (ALT/SGPT): 14 ()  Alanine Aminotransferase (ALT/SGPT): 12 ()  Alanine Aminotransferase (ALT/SGPT): 30 (07-10)  Alanine Aminotransferase (ALT/SGPT): 32 ()  Alanine Aminotransferase (ALT/SGPT): 34 (08)  Aspartate Aminotransferase (AST/SGOT): 22 (21 @ 14:23)  Aspartate Aminotransferase (AST/SGOT): 18 (21 @ 22:01)  Aspartate Aminotransferase (AST/SGOT): 29 (07-10-20 @ 05:56)  Aspartate Aminotransferase (AST/SGOT): 29 (20 @ 06:07)  Aspartate Aminotransferase (AST/SGOT): 38 (20 @ 06:07)  Bilirubin Total, Serum: 0.2 ()  Bilirubin Total, Serum: 0.2 ()  Bilirubin Total, Serum: 0.2 (07-10)  Bilirubin Total, Serum: 0.2 ()  Bilirubin Total, Serum: 0.2 ()      Trend LDH  18 @ 02:37  176      Urinalysis Basic - ( 2021 23:37 )  Color: Yellow / Appearance: Turbid / S.018 / pH: x  Gluc: x / Ketone: Negative  / Bili: Negative / Urobili: Negative   Blood: x / Protein: 100 mg/dL / Nitrite: Positive   Leuk Esterase: Large / RBC: 30 /hpf /  /HPF   Sq Epi: x / Non Sq Epi: 1 /hpf / Bacteria: Negative        MICROBIOLOGY:      Culture - Blood (collected 2021 05:18)  Source: .Blood Blood-Venous  Preliminary Report:    No growth to date.    Culture - Blood (collected 2021 05:18)  Source: .Blood Blood-Peripheral  Preliminary Report:    No growth to date.    Culture - Urine (collected 2021 03:48)  Source: .Urine Clean Catch (Midstream)  Preliminary Report:    >100,000 CFU/ml Pseudomonas aeruginosa    Culture - Blood (collected 2020 09:01)  Source: .Blood Blood-Peripheral  Final Report:    No Growth Final    Culture - Blood (collected 2020 14:50)  Source: .Blood Blood-Peripheral  Final Report:    No Growth Final    Culture - Urine (collected 2020 19:39)  Source: .Urine Clean Catch (Midstream)  Final Report:    10,000 - 49,000 CFU/mL Pseudomonas aeruginosa (Carbapenem Resistant)  Organism: Pseudomonas aeruginosa (Carbapenem Resistant)  Organism: Pseudomonas aeruginosa (Carbapenem Resistant)    Sensitivities:      -  Amikacin: S <=16      -  Aztreonam: R >16      -  Cefepime: S 8      -  Ceftazidime: S 4      -  Ciprofloxacin: S <=1      -  Gentamicin: S <=4      -  Imipenem: R 8      -  Levofloxacin: S <=2      -  Meropenem: R >8      -  Piperacillin/Tazobactam: S <=16      -  Tobramycin: S <=4      Method Type: AUTUMN    Culture - Blood (collected 2020 16:38)  Source: .Blood Blood-Peripheral  Final Report:    Growth in aerobic bottle: Pseudomonas aeruginosa    ***Blood Panel PCR results on this specimen are available    approximately 3 hours after the Gram stain result.***    Gram stain, PCR, and/or culture results may not always    correspond due to differencein methodologies.    ************************************************************    This PCR assay was performed using [employer].    The following targets are tested for: Enterococcus,    vancomycin resistant enterococci, Listeria monocytogenes,    coagulase negative staphylococci, S. aureus,    methicillin resistant S. aureus, Streptococcus agalactiae    (Group B), S. pneumoniae, S. pyogenes (Group A),    Acinetobacter baumannii, Enterobacter cloacae, E. coli,    Klebsiella oxytoca, K. pneumoniae, Proteus sp.,    Serratia marcescens, Haemophilus influenzae,    Neisseria meningitidis, Pseudomonas aeruginosa, Candida    albicans, C. glabrata, C krusei, C parapsilosis,    C. tropicalis and the KPC resistance gene.    "Due to technical problems, Proteus sp. will Not be reported as part of    the BCID panel until further notice"  Organism: Blood Culture PCR  Pseudomonas aeruginosa  Organism: Pseudomonas aeruginosa    Sensitivities:      -  Amikacin: S <=16      -  Aztreonam: R >16      -  Cefepime: S 8      -  Ceftazidime: S 8      -  Ciprofloxacin: I 0.5      -  Gentamicin: S <=2      -  Levofloxacin: R 2      -  Meropenem: S <=1      -  Piperacillin/Tazobactam: S 16      -  Tobramycin: S <=2      Method Type: AUTUMN  Organism: Blood Culture PCR    Sensitivities:      -  Pseudomonas aeruginosa: Detec      Method Type: PCR    Culture - Blood (collected 2020 16:38)  Source: .Blood Blood-Peripheral  Final Report:    Growth in aerobic bottle: Pseudomonas aeruginosa    See previous culture [id number]    Culture - Blood (collected 2020 09:04)  Source: .Blood Blood  Final Report:    No growth at 5 days.    Culture - Blood (collected 2020 09:04)  Source: .Blood Blood  Final Report:    No growth at 5 days.      COVID-19 PCR: NotDetec (21 @ 23:22)  COVID-19 PCR: NotDetec (21 @ 16:51)    COVID-19 [facility] AB Interp: Positive (21 @ 09:02)    Ferritin, Serum: 450 ()    Blood Gas Venous - Lactate: 0.6 ( @ 22:01)      RADIOLOGY:  imaging below personally reviewed     Follow Up: PsA UTI    Interval History/ROS:Patient is a 70y old  Male who presents with a chief complaint of Sent in for Hyperkalemia and worsening renal function (2021 07:22)  - Started on Zosyn last night  - Velez placed yesterday due to urine retention > 400 cc  - Feeling well today, no complaint, no dysuria    REVIEW OF SYSTEMS  [  ] ROS unobtainable because:    [ V ] All other systems negative except as noted below    Constitutional:  [ ] fever [ ] chills  [ ] weight loss  [ ]night sweat  [ ]poor appetite/PO intake [ ]fatigue   Skin:  [ ] rash [ ] phlebitis	  Eyes: [ ] icterus [ ] pain  [ ] discharge	  ENMT: [ ] sore throat  [ ] thrush [ ] ulcers [ ] exudates [ ]anosmia  Respiratory: [ ] dyspnea [ ] hemoptysis [ ] cough [ ] sputum	  Cardiovascular:  [ ] chest pain [ ] palpitations [ ] edema	  Gastrointestinal:  [ ] nausea [ ] vomiting [ ] diarrhea [ ] constipation [ ] pain	  Genitourinary:  [ ] dysuria [ ] frequency [ ] hematuria [ ] discharge [ ] flank pain  [ ] incontinence  Musculoskeletal:  [ ] myalgias [ ] arthralgias [ ] arthritis  [ ] back pain  Neurological:  [ ] headache [ ] weakness [ ] seizures  [ ] confusion/altered mental status    Allergies  No Known Allergies    ANTIMICROBIALS:    piperacillin/tazobactam IVPB.. 3.375 every 12 hours  trimethoprim   80 mG/sulfamethoxazole 400 mG 1 daily    OTHER MEDS: MEDICATIONS  (STANDING):  allopurinol 100 daily  dextrose 40% Gel 15 once  dextrose 50% Injectable 25 once  dextrose 50% Injectable 12.5 once  dextrose 50% Injectable 25 once  glucagon  Injectable 1 once  heparin   Injectable 5000 every 8 hours  insulin lispro (ADMELOG) corrective regimen sliding scale  three times a day before meals  insulin lispro (ADMELOG) corrective regimen sliding scale  at bedtime  metoprolol succinate  daily  mycophenolate mofetil 500 two times a day  NIFEdipine XL 60 daily  predniSONE   Tablet 5 daily  tacrolimus ER Tablet (ENVARSUS XR) 7 <User Schedule>  tamsulosin 0.4 at bedtime      Vital Signs Last 24 Hrs  T(F): 97.7 (21 @ 05:30), Max: 98.2 (21 @ 17:57)    Vital Signs Last 24 Hrs  HR: 66 (21 @ 05:30) (66 - 68)  BP: 124/62 (21 @ 05:30) (117/67 - 150/71)  RR: 18 (21 @ 05:30)  SpO2: 99% (21 @ 05:30) (97% - 100%)  Wt(kg): --    EXAM:  GA: NAD  HEENT: oral cavity no lesion  CV: nl S1/S2, no RMG  Lungs: CTAB  Abd: BS+, soft, nontender, no rebounding pain  Ext: no edema. Left forearm AVF thrill+, nontender  Skin: no rash  IV: no phlebitis  No bilateral CVA tenderness  Velez+    Labs:                        11.5   7.89  )-----------( 143      ( 2021 14:22 )             36.8         136  |  106  |  56<H>  ----------------------------<  101<H>  5.4<H>   |  18<L>  |  4.52<H>    Ca    11.1<H>      2021 06:46  Phos  5.1       Mg     1.6         TPro  7.8  /  Alb  4.2  /  TBili  0.2  /  DBili  x   /  AST  22  /  ALT  14  /  AlkPhos  87        WBC Trend:  WBC Count: 7.89 (21 @ 14:22)  WBC Count: 6.29 (21 @ 22:01)      Creatine Trend:  Creatinine, Serum: 4.52 ()  Creatinine, Serum: 4.88 ()  Creatinine, Serum: 5.21 ()  Creatinine, Serum: 5.31 ()      Liver Biochemical Testing Trend:  Alanine Aminotransferase (ALT/SGPT): 14 ()  Alanine Aminotransferase (ALT/SGPT): 12 ()  Alanine Aminotransferase (ALT/SGPT): 30 (07-10)  Alanine Aminotransferase (ALT/SGPT): 32 ()  Alanine Aminotransferase (ALT/SGPT): 34 (08)  Aspartate Aminotransferase (AST/SGOT): 22 (21 @ 14:23)  Aspartate Aminotransferase (AST/SGOT): 18 (21 @ 22:01)  Aspartate Aminotransferase (AST/SGOT): 29 (07-10-20 @ 05:56)  Aspartate Aminotransferase (AST/SGOT): 29 (20 @ 06:07)  Aspartate Aminotransferase (AST/SGOT): 38 (20 @ 06:07)  Bilirubin Total, Serum: 0.2 ()  Bilirubin Total, Serum: 0.2 ()  Bilirubin Total, Serum: 0.2 (07-10)  Bilirubin Total, Serum: 0.2 ()  Bilirubin Total, Serum: 0.2 ()      Trend LDH  18 @ 02:37  176      Urinalysis Basic - ( 2021 23:37 )  Color: Yellow / Appearance: Turbid / S.018 / pH: x  Gluc: x / Ketone: Negative  / Bili: Negative / Urobili: Negative   Blood: x / Protein: 100 mg/dL / Nitrite: Positive   Leuk Esterase: Large / RBC: 30 /hpf /  /HPF   Sq Epi: x / Non Sq Epi: 1 /hpf / Bacteria: Negative        MICROBIOLOGY:      Culture - Blood (collected 2021 05:18)  Source: .Blood Blood-Venous  Preliminary Report:    No growth to date.    Culture - Blood (collected 2021 05:18)  Source: .Blood Blood-Peripheral  Preliminary Report:    No growth to date.    Culture - Urine (collected 2021 03:48)  Source: .Urine Clean Catch (Midstream)  Preliminary Report:    >100,000 CFU/ml Pseudomonas aeruginosa    Culture - Blood (collected 2020 09:01)  Source: .Blood Blood-Peripheral  Final Report:    No Growth Final    Culture - Blood (collected 2020 14:50)  Source: .Blood Blood-Peripheral  Final Report:    No Growth Final    Culture - Urine (collected 2020 19:39)  Source: .Urine Clean Catch (Midstream)  Final Report:    10,000 - 49,000 CFU/mL Pseudomonas aeruginosa (Carbapenem Resistant)  Organism: Pseudomonas aeruginosa (Carbapenem Resistant)  Organism: Pseudomonas aeruginosa (Carbapenem Resistant)    Sensitivities:      -  Amikacin: S <=16      -  Aztreonam: R >16      -  Cefepime: S 8      -  Ceftazidime: S 4      -  Ciprofloxacin: S <=1      -  Gentamicin: S <=4      -  Imipenem: R 8      -  Levofloxacin: S <=2      -  Meropenem: R >8      -  Piperacillin/Tazobactam: S <=16      -  Tobramycin: S <=4      Method Type: AUTUMN    Culture - Blood (collected 2020 16:38)  Source: .Blood Blood-Peripheral  Final Report:    Growth in aerobic bottle: Pseudomonas aeruginosa    ***Blood Panel PCR results on this specimen are available    approximately 3 hours after the Gram stain result.***    Gram stain, PCR, and/or culture results may not always    correspond due to differencein methodologies.    ************************************************************    This PCR assay was performed using [employer].    The following targets are tested for: Enterococcus,    vancomycin resistant enterococci, Listeria monocytogenes,    coagulase negative staphylococci, S. aureus,    methicillin resistant S. aureus, Streptococcus agalactiae    (Group B), S. pneumoniae, S. pyogenes (Group A),    Acinetobacter baumannii, Enterobacter cloacae, E. coli,    Klebsiella oxytoca, K. pneumoniae, Proteus sp.,    Serratia marcescens, Haemophilus influenzae,    Neisseria meningitidis, Pseudomonas aeruginosa, Candida    albicans, C. glabrata, C krusei, C parapsilosis,    C. tropicalis and the KPC resistance gene.    "Due to technical problems, Proteus sp. will Not be reported as part of    the BCID panel until further notice"  Organism: Blood Culture PCR  Pseudomonas aeruginosa  Organism: Pseudomonas aeruginosa    Sensitivities:      -  Amikacin: S <=16      -  Aztreonam: R >16      -  Cefepime: S 8      -  Ceftazidime: S 8      -  Ciprofloxacin: I 0.5      -  Gentamicin: S <=2      -  Levofloxacin: R 2      -  Meropenem: S <=1      -  Piperacillin/Tazobactam: S 16      -  Tobramycin: S <=2      Method Type: AUTUMN  Organism: Blood Culture PCR    Sensitivities:      -  Pseudomonas aeruginosa: Detec      Method Type: PCR    Culture - Blood (collected 2020 16:38)  Source: .Blood Blood-Peripheral  Final Report:    Growth in aerobic bottle: Pseudomonas aeruginosa    See previous culture [id number]    Culture - Blood (collected 2020 09:04)  Source: .Blood Blood  Final Report:    No growth at 5 days.    Culture - Blood (collected 2020 09:04)  Source: .Blood Blood  Final Report:    No growth at 5 days.      COVID-19 PCR: NotDetec (21 @ 23:22)  COVID-19 PCR: NotDetec (21 @ 16:51)    COVID-19 [facility] Domain AB Interp: Positive (21 @ 09:02)    Ferritin, Serum: 450 ()    Blood Gas Venous - Lactate: 0.6 ( @ 22:01)      RADIOLOGY:    <The imaging below has been reviewed and visualized by me independently. Findings as detailed in report below>    EXAM:  US KIDNEY TRANSPLANT W DOPP RT                        PROCEDURE DATE:  2021    Elevated postvoid residual with mild to moderate hydronephrosis. Circumferential bladder wall and urothelial thickening with mild bladder wall trabeculations may be secondary to some degree of chronic bladder outlet obstruction. However, correlate with urinalysis.  No evidence of a significant renal artery stenosis.

## 2021-06-03 NOTE — PROGRESS NOTE ADULT - PROBLEM SELECTOR PLAN 4
-Continue with home Prednisone, CellCept, and Tacrolimus  -F/Utacro level  -Continue with Mag Oxide  -F/UCMV PCR  -Continue with Bactrim PPx  -Follow up with Transplant ID & Nephrology

## 2021-06-03 NOTE — PROGRESS NOTE ADULT - PROBLEM SELECTOR PLAN 3
Reviewed outpt Pseudomonas only sensitive to Meropenem, intermediate to cefepime but our previous cultures are not sensitive to carbapenems   -Discussed with ID attending and based on all cultures will continue zosyn and f/u sensitivities

## 2021-06-03 NOTE — PROGRESS NOTE ADULT - PROBLEM SELECTOR PLAN 6
-F/U PSA  -Patient told Orgovyx is non-formulary and would need to supply his own- brought it in and needs oncology consult to place order only  (we attempted to place order last night but pharmacy requires oncology to do it)  -Pending outpatient follow up for radiation  -Urology consult for retention w/ cancer and also hydro w/ stents

## 2021-06-04 DIAGNOSIS — N39.0 URINARY TRACT INFECTION, SITE NOT SPECIFIED: ICD-10-CM

## 2021-06-04 LAB
24R-OH-CALCIDIOL SERPL-MCNC: 37 NG/ML — SIGNIFICANT CHANGE UP (ref 30–80)
ANION GAP SERPL CALC-SCNC: 14 MMOL/L — SIGNIFICANT CHANGE UP (ref 5–17)
BUN SERPL-MCNC: 50 MG/DL — HIGH (ref 7–23)
CALCIUM SERPL-MCNC: 10.3 MG/DL — SIGNIFICANT CHANGE UP (ref 8.4–10.5)
CALCIUM SERPL-MCNC: 10.4 MG/DL — SIGNIFICANT CHANGE UP (ref 8.4–10.5)
CHLORIDE SERPL-SCNC: 107 MMOL/L — SIGNIFICANT CHANGE UP (ref 96–108)
CO2 SERPL-SCNC: 18 MMOL/L — LOW (ref 22–31)
CREAT SERPL-MCNC: 3.81 MG/DL — HIGH (ref 0.5–1.3)
GLUCOSE BLDC GLUCOMTR-MCNC: 129 MG/DL — HIGH (ref 70–99)
GLUCOSE BLDC GLUCOMTR-MCNC: 132 MG/DL — HIGH (ref 70–99)
GLUCOSE BLDC GLUCOMTR-MCNC: 180 MG/DL — HIGH (ref 70–99)
GLUCOSE SERPL-MCNC: 115 MG/DL — HIGH (ref 70–99)
HCT VFR BLD CALC: 32 % — LOW (ref 39–50)
HGB BLD-MCNC: 10.3 G/DL — LOW (ref 13–17)
MCHC RBC-ENTMCNC: 29.3 PG — SIGNIFICANT CHANGE UP (ref 27–34)
MCHC RBC-ENTMCNC: 32.2 GM/DL — SIGNIFICANT CHANGE UP (ref 32–36)
MCV RBC AUTO: 90.9 FL — SIGNIFICANT CHANGE UP (ref 80–100)
NRBC # BLD: 0 /100 WBCS — SIGNIFICANT CHANGE UP (ref 0–0)
PLATELET # BLD AUTO: 135 K/UL — LOW (ref 150–400)
POTASSIUM SERPL-MCNC: 4.6 MMOL/L — SIGNIFICANT CHANGE UP (ref 3.5–5.3)
POTASSIUM SERPL-SCNC: 4.6 MMOL/L — SIGNIFICANT CHANGE UP (ref 3.5–5.3)
PTH-INTACT FLD-MCNC: 22 PG/ML — SIGNIFICANT CHANGE UP (ref 15–65)
RBC # BLD: 3.52 M/UL — LOW (ref 4.2–5.8)
RBC # FLD: 14.5 % — SIGNIFICANT CHANGE UP (ref 10.3–14.5)
SODIUM SERPL-SCNC: 139 MMOL/L — SIGNIFICANT CHANGE UP (ref 135–145)
TACROLIMUS SERPL-MCNC: 6.9 NG/ML — SIGNIFICANT CHANGE UP
WBC # BLD: 4.88 K/UL — SIGNIFICANT CHANGE UP (ref 3.8–10.5)
WBC # FLD AUTO: 4.88 K/UL — SIGNIFICANT CHANGE UP (ref 3.8–10.5)

## 2021-06-04 PROCEDURE — 99232 SBSQ HOSP IP/OBS MODERATE 35: CPT

## 2021-06-04 PROCEDURE — 99233 SBSQ HOSP IP/OBS HIGH 50: CPT

## 2021-06-04 PROCEDURE — 99232 SBSQ HOSP IP/OBS MODERATE 35: CPT | Mod: GC

## 2021-06-04 RX ORDER — PIPERACILLIN AND TAZOBACTAM 4; .5 G/20ML; G/20ML
3.38 INJECTION, POWDER, LYOPHILIZED, FOR SOLUTION INTRAVENOUS EVERY 12 HOURS
Refills: 0 | Status: DISCONTINUED | OUTPATIENT
Start: 2021-06-04 | End: 2021-06-07

## 2021-06-04 RX ADMIN — TAMSULOSIN HYDROCHLORIDE 0.4 MILLIGRAM(S): 0.4 CAPSULE ORAL at 21:38

## 2021-06-04 RX ADMIN — Medication 60 MILLIGRAM(S): at 05:54

## 2021-06-04 RX ADMIN — Medication 1 TABLET(S): at 09:34

## 2021-06-04 RX ADMIN — TACROLIMUS 7 MILLIGRAM(S): 5 CAPSULE ORAL at 12:07

## 2021-06-04 RX ADMIN — PIPERACILLIN AND TAZOBACTAM 25 GRAM(S): 4; .5 INJECTION, POWDER, LYOPHILIZED, FOR SOLUTION INTRAVENOUS at 18:13

## 2021-06-04 RX ADMIN — Medication 5 MILLIGRAM(S): at 05:52

## 2021-06-04 RX ADMIN — HEPARIN SODIUM 5000 UNIT(S): 5000 INJECTION INTRAVENOUS; SUBCUTANEOUS at 13:35

## 2021-06-04 RX ADMIN — Medication 30 MILLILITER(S): at 21:38

## 2021-06-04 RX ADMIN — HEPARIN SODIUM 5000 UNIT(S): 5000 INJECTION INTRAVENOUS; SUBCUTANEOUS at 21:37

## 2021-06-04 RX ADMIN — MYCOPHENOLATE MOFETIL 500 MILLIGRAM(S): 250 CAPSULE ORAL at 05:54

## 2021-06-04 RX ADMIN — MAGNESIUM OXIDE 400 MG ORAL TABLET 400 MILLIGRAM(S): 241.3 TABLET ORAL at 12:07

## 2021-06-04 RX ADMIN — Medication 1: at 13:36

## 2021-06-04 RX ADMIN — Medication 100 MILLIGRAM(S): at 05:52

## 2021-06-04 RX ADMIN — PIPERACILLIN AND TAZOBACTAM 25 GRAM(S): 4; .5 INJECTION, POWDER, LYOPHILIZED, FOR SOLUTION INTRAVENOUS at 09:39

## 2021-06-04 RX ADMIN — MYCOPHENOLATE MOFETIL 500 MILLIGRAM(S): 250 CAPSULE ORAL at 18:12

## 2021-06-04 RX ADMIN — Medication 100 MILLIGRAM(S): at 09:34

## 2021-06-04 RX ADMIN — HEPARIN SODIUM 5000 UNIT(S): 5000 INJECTION INTRAVENOUS; SUBCUTANEOUS at 05:51

## 2021-06-04 RX ADMIN — Medication 30 MILLILITER(S): at 09:39

## 2021-06-04 NOTE — PROGRESS NOTE ADULT - SUBJECTIVE AND OBJECTIVE BOX
Follow Up: PsA UTI    Interval History/ROS:Patient is a 70y old  Male who presents with a chief complaint of Sent in for Hyperkalemia and worsening renal function (03 Jun 2021 07:22)  - UCx PsA sensitive to Zosyn    REVIEW OF SYSTEMS  [  ] ROS unobtainable because:    [ V ] All other systems negative except as noted below    Constitutional:  [ ] fever [ ] chills  [ ] weight loss  [ ]night sweat  [ ]poor appetite/PO intake [ ]fatigue   Skin:  [ ] rash [ ] phlebitis	  Eyes: [ ] icterus [ ] pain  [ ] discharge	  ENMT: [ ] sore throat  [ ] thrush [ ] ulcers [ ] exudates [ ]anosmia  Respiratory: [ ] dyspnea [ ] hemoptysis [ ] cough [ ] sputum	  Cardiovascular:  [ ] chest pain [ ] palpitations [ ] edema	  Gastrointestinal:  [ ] nausea [ ] vomiting [ ] diarrhea [ ] constipation [ ] pain	  Genitourinary:  [ ] dysuria [ ] frequency [ ] hematuria [ ] discharge [ ] flank pain  [ ] incontinence  Musculoskeletal:  [ ] myalgias [ ] arthralgias [ ] arthritis  [ ] back pain  Neurological:  [ ] headache [ ] weakness [ ] seizures  [ ] confusion/altered mental status    Allergies  No Known Allergies    ANTIMICROBIALS:    piperacillin/tazobactam IVPB.. 3.375 every 12 hours  trimethoprim   80 mG/sulfamethoxazole 400 mG 1 daily    OTHER MEDS: MEDICATIONS  (STANDING):  allopurinol 100 daily  dextrose 40% Gel 15 once  dextrose 50% Injectable 25 once  dextrose 50% Injectable 12.5 once  dextrose 50% Injectable 25 once  glucagon  Injectable 1 once  heparin   Injectable 5000 every 8 hours  insulin lispro (ADMELOG) corrective regimen sliding scale  three times a day before meals  insulin lispro (ADMELOG) corrective regimen sliding scale  at bedtime  metoprolol succinate  daily  mycophenolate mofetil 500 two times a day  NIFEdipine XL 60 daily  predniSONE   Tablet 5 daily  tacrolimus ER Tablet (ENVARSUS XR) 7 <User Schedule>  tamsulosin 0.4 at bedtime    Vital Signs Last 24 Hrs  T(F): 97.7 (06-04-21 @ 04:53), Max: 98.3 (06-03-21 @ 20:36)    Vital Signs Last 24 Hrs  HR: 62 (06-04-21 @ 04:53) (62 - 70)  BP: 127/66 (06-04-21 @ 04:53) (116/59 - 127/66)  RR: 18 (06-04-21 @ 04:53)  SpO2: 99% (06-04-21 @ 04:53) (98% - 99%)  Wt(kg): --    EXAM:  GA: NAD  HEENT: oral cavity no lesion  CV: nl S1/S2, no RMG  Lungs: CTAB  Abd: BS+, soft, nontender, no rebounding pain  Ext: no edema. Left forearm AVF thrill+, nontender  Skin: no rash  IV: no phlebitis  No bilateral CVA tenderness  Velez+    Labs:                        10.3   4.88  )-----------( 135      ( 04 Jun 2021 06:36 )             32.0     06-04    139  |  107  |  50<H>  ----------------------------<  115<H>  4.6   |  18<L>  |  3.81<H>    Ca    10.4      04 Jun 2021 06:36    TPro  7.8  /  Alb  4.2  /  TBili  0.2  /  DBili  x   /  AST  22  /  ALT  14  /  AlkPhos  87  06-02      WBC Trend:  WBC Count: 4.88 (06-04-21 @ 06:36)  WBC Count: 7.89 (06-02-21 @ 14:22)  WBC Count: 6.29 (06-01-21 @ 22:01)      Auto Neutrophil #: 2.03 K/uL (06-01-21 @ 22:01)  Auto Neutrophil #: 1.31 K/uL (07-09-20 @ 06:07)  Auto Neutrophil #: 5.35 K/uL (07-05-20 @ 13:36)  Band Neutrophils %: 1.7 % (07-05-20 @ 13:36)      Creatine Trend:  Creatinine, Serum: 3.81 (06-04)  Creatinine, Serum: 4.52 (06-03)  Creatinine, Serum: 4.88 (06-02)  Creatinine, Serum: 5.21 (06-02)      Liver Biochemical Testing Trend:  Alanine Aminotransferase (ALT/SGPT): 14 (06-02)  Alanine Aminotransferase (ALT/SGPT): 12 (06-01)  Alanine Aminotransferase (ALT/SGPT): 30 (07-10)  Alanine Aminotransferase (ALT/SGPT): 32 (07-09)  Alanine Aminotransferase (ALT/SGPT): 34 (07-08)  Aspartate Aminotransferase (AST/SGOT): 22 (06-02-21 @ 14:23)  Aspartate Aminotransferase (AST/SGOT): 18 (06-01-21 @ 22:01)  Aspartate Aminotransferase (AST/SGOT): 29 (07-10-20 @ 05:56)  Aspartate Aminotransferase (AST/SGOT): 29 (07-09-20 @ 06:07)  Aspartate Aminotransferase (AST/SGOT): 38 (07-08-20 @ 06:07)  Bilirubin Total, Serum: 0.2 (06-02)  Bilirubin Total, Serum: 0.2 (06-01)  Bilirubin Total, Serum: 0.2 (07-10)  Bilirubin Total, Serum: 0.2 (07-09)  Bilirubin Total, Serum: 0.2 (07-08)      MICROBIOLOGY:      Culture - Blood (collected 02 Jun 2021 05:18)  Source: .Blood Blood-Venous  Preliminary Report:    No growth to date.    Culture - Blood (collected 02 Jun 2021 05:18)  Source: .Blood Blood-Peripheral  Preliminary Report:    No growth to date.    Culture - Urine (collected 02 Jun 2021 03:48)  Source: .Urine Clean Catch (Midstream)  Final Report:    >100,000 CFU/ml Pseudomonas aeruginosa  Organism: Pseudomonas aeruginosa  Organism: Pseudomonas aeruginosa    Sensitivities:      -  Amikacin: S <=16      -  Aztreonam: I 16      -  Cefepime: S 8      -  Ceftazidime: S 4      -  Ciprofloxacin: R >2      -  Gentamicin: S <=2      -  Imipenem: I 4      -  Levofloxacin: R >4      -  Meropenem: I 4      -  Piperacillin/Tazobactam: S <=8      -  Tobramycin: S <=2      Method Type: AUTUMN    Culture - Blood (collected 09 Jul 2020 09:01)  Source: .Blood Blood-Peripheral  Final Report:    No Growth Final    Culture - Blood (collected 08 Jul 2020 14:50)  Source: .Blood Blood-Peripheral  Final Report:    No Growth Final    Culture - Urine (collected 05 Jul 2020 19:39)  Source: .Urine Clean Catch (Midstream)  Final Report:    10,000 - 49,000 CFU/mL Pseudomonas aeruginosa (Carbapenem Resistant)  Organism: Pseudomonas aeruginosa (Carbapenem Resistant)  Organism: Pseudomonas aeruginosa (Carbapenem Resistant)    Sensitivities:      -  Amikacin: S <=16      -  Aztreonam: R >16      -  Cefepime: S 8      -  Ceftazidime: S 4      -  Ciprofloxacin: S <=1      -  Gentamicin: S <=4      -  Imipenem: R 8      -  Levofloxacin: S <=2      -  Meropenem: R >8      -  Piperacillin/Tazobactam: S <=16      -  Tobramycin: S <=4      Method Type: AUTUMN    Culture - Blood (collected 05 Jul 2020 16:38)  Source: .Blood Blood-Peripheral  Final Report:    Growth in aerobic bottle: Pseudomonas aeruginosa    ***Blood Panel PCR results on this specimen are available    approximately 3 hours after the Gram stain result.***    Gram stain, PCR, and/or culture results may not always    correspond due to differencein methodologies.    ************************************************************    This PCR assay was performed using [employer].    The following targets are tested for: Enterococcus,    vancomycin resistant enterococci, Listeria monocytogenes,    coagulase negative staphylococci, S. aureus,    methicillin resistant S. aureus, Streptococcus agalactiae    (Group B), S. pneumoniae, S. pyogenes (Group A),    Acinetobacter baumannii, Enterobacter cloacae, E. coli,    Klebsiella oxytoca, K. pneumoniae, Proteus sp.,    Serratia marcescens, Haemophilus influenzae,    Neisseria meningitidis, Pseudomonas aeruginosa, Candida    albicans, C. glabrata, C krusei, C parapsilosis,    C. tropicalis and the KPC resistance gene.    "Due to technical problems, Proteus sp. will Not be reported as part of    the BCID panel until further notice"  Organism: Blood Culture PCR  Pseudomonas aeruginosa  Organism: Pseudomonas aeruginosa    Sensitivities:      -  Amikacin: S <=16      -  Aztreonam: R >16      -  Cefepime: S 8      -  Ceftazidime: S 8      -  Ciprofloxacin: I 0.5      -  Gentamicin: S <=2      -  Levofloxacin: R 2      -  Meropenem: S <=1      -  Piperacillin/Tazobactam: S 16      -  Tobramycin: S <=2      Method Type: AUTUMN  Organism: Blood Culture PCR    Sensitivities:      -  Pseudomonas aeruginosa: Detec      Method Type: PCR    Culture - Blood (collected 05 Jul 2020 16:38)  Source: .Blood Blood-Peripheral  Final Report:    Growth in aerobic bottle: Pseudomonas aeruginosa    See previous culture [id number]    Culture - Blood (collected 26 Feb 2020 09:04)  Source: .Blood Blood  Final Report:    No growth at 5 days.    Culture - Blood (collected 26 Feb 2020 09:04)  Source: .Blood Blood  Final Report:    No growth at 5 days.      COVID-19 PCR: NotDetec (06-01-21 @ 23:22)  COVID-19 PCR: NotDetec (04-20-21 @ 16:51)    COVID-19 [facility] Domain AB Interp: Positive (06-03-21 @ 09:02)    Ferritin, Serum: 450 (06-02)    Blood Gas Venous - Lactate: 0.6 (06-01 @ 22:01)          RADIOLOGY:    <The imaging below has been reviewed and visualized by me independently. Findings as detailed in report below>    EXAM:  US KIDNEY TRANSPLANT W DOPP RT                        PROCEDURE DATE:  06/02/2021    Elevated postvoid residual with mild to moderate hydronephrosis. Circumferential bladder wall and urothelial thickening with mild bladder wall trabeculations may be secondary to some degree of chronic bladder outlet obstruction. However, correlate with urinalysis.  No evidence of a significant renal artery stenosis.     Follow Up: PsA UTI    Interval History/ROS:Patient is a 70y old  Male who presents with a chief complaint of Sent in for Hyperkalemia and worsening renal function (03 Jun 2021 07:22)  - UCx PsA sensitive to Zosyn  - Doing good, no complaint    REVIEW OF SYSTEMS  [  ] ROS unobtainable because:    [ V ] All other systems negative except as noted below    Constitutional:  [ ] fever [ ] chills  [ ] weight loss  [ ]night sweat  [ ]poor appetite/PO intake [ ]fatigue   Skin:  [ ] rash [ ] phlebitis	  Eyes: [ ] icterus [ ] pain  [ ] discharge	  ENMT: [ ] sore throat  [ ] thrush [ ] ulcers [ ] exudates [ ]anosmia  Respiratory: [ ] dyspnea [ ] hemoptysis [ ] cough [ ] sputum	  Cardiovascular:  [ ] chest pain [ ] palpitations [ ] edema	  Gastrointestinal:  [ ] nausea [ ] vomiting [ ] diarrhea [ ] constipation [ ] pain	  Genitourinary:  [ ] dysuria [ ] frequency [ ] hematuria [ ] discharge [ ] flank pain  [ ] incontinence  Musculoskeletal:  [ ] myalgias [ ] arthralgias [ ] arthritis  [ ] back pain  Neurological:  [ ] headache [ ] weakness [ ] seizures  [ ] confusion/altered mental status    Allergies  No Known Allergies    ANTIMICROBIALS:    piperacillin/tazobactam IVPB.. 3.375 every 12 hours  trimethoprim   80 mG/sulfamethoxazole 400 mG 1 daily    OTHER MEDS: MEDICATIONS  (STANDING):  allopurinol 100 daily  dextrose 40% Gel 15 once  dextrose 50% Injectable 25 once  dextrose 50% Injectable 12.5 once  dextrose 50% Injectable 25 once  glucagon  Injectable 1 once  heparin   Injectable 5000 every 8 hours  insulin lispro (ADMELOG) corrective regimen sliding scale  three times a day before meals  insulin lispro (ADMELOG) corrective regimen sliding scale  at bedtime  metoprolol succinate  daily  mycophenolate mofetil 500 two times a day  NIFEdipine XL 60 daily  predniSONE   Tablet 5 daily  tacrolimus ER Tablet (ENVARSUS XR) 7 <User Schedule>  tamsulosin 0.4 at bedtime    Vital Signs Last 24 Hrs  T(F): 97.7 (06-04-21 @ 04:53), Max: 98.3 (06-03-21 @ 20:36)    Vital Signs Last 24 Hrs  HR: 62 (06-04-21 @ 04:53) (62 - 70)  BP: 127/66 (06-04-21 @ 04:53) (116/59 - 127/66)  RR: 18 (06-04-21 @ 04:53)  SpO2: 99% (06-04-21 @ 04:53) (98% - 99%)  Wt(kg): --    EXAM:  GA: NAD  HEENT: oral cavity no lesion  CV: nl S1/S2, no RMG  Lungs: CTAB  Abd: BS+, soft, nontender, no rebounding pain  Ext: no edema. Left forearm AVF thrill+, nontender  Skin: no rash  IV: no phlebitis  No bilateral CVA tenderness  Velez+ with sediments on the Velez tube    Labs:                        10.3   4.88  )-----------( 135      ( 04 Jun 2021 06:36 )             32.0     06-04    139  |  107  |  50<H>  ----------------------------<  115<H>  4.6   |  18<L>  |  3.81<H>    Ca    10.4      04 Jun 2021 06:36    TPro  7.8  /  Alb  4.2  /  TBili  0.2  /  DBili  x   /  AST  22  /  ALT  14  /  AlkPhos  87  06-02      WBC Trend:  WBC Count: 4.88 (06-04-21 @ 06:36)  WBC Count: 7.89 (06-02-21 @ 14:22)  WBC Count: 6.29 (06-01-21 @ 22:01)      Auto Neutrophil #: 2.03 K/uL (06-01-21 @ 22:01)  Auto Neutrophil #: 1.31 K/uL (07-09-20 @ 06:07)  Auto Neutrophil #: 5.35 K/uL (07-05-20 @ 13:36)  Band Neutrophils %: 1.7 % (07-05-20 @ 13:36)      Creatine Trend:  Creatinine, Serum: 3.81 (06-04)  Creatinine, Serum: 4.52 (06-03)  Creatinine, Serum: 4.88 (06-02)  Creatinine, Serum: 5.21 (06-02)      Liver Biochemical Testing Trend:  Alanine Aminotransferase (ALT/SGPT): 14 (06-02)  Alanine Aminotransferase (ALT/SGPT): 12 (06-01)  Alanine Aminotransferase (ALT/SGPT): 30 (07-10)  Alanine Aminotransferase (ALT/SGPT): 32 (07-09)  Alanine Aminotransferase (ALT/SGPT): 34 (07-08)  Aspartate Aminotransferase (AST/SGOT): 22 (06-02-21 @ 14:23)  Aspartate Aminotransferase (AST/SGOT): 18 (06-01-21 @ 22:01)  Aspartate Aminotransferase (AST/SGOT): 29 (07-10-20 @ 05:56)  Aspartate Aminotransferase (AST/SGOT): 29 (07-09-20 @ 06:07)  Aspartate Aminotransferase (AST/SGOT): 38 (07-08-20 @ 06:07)  Bilirubin Total, Serum: 0.2 (06-02)  Bilirubin Total, Serum: 0.2 (06-01)  Bilirubin Total, Serum: 0.2 (07-10)  Bilirubin Total, Serum: 0.2 (07-09)  Bilirubin Total, Serum: 0.2 (07-08)      MICROBIOLOGY:      Culture - Blood (collected 02 Jun 2021 05:18)  Source: .Blood Blood-Venous  Preliminary Report:    No growth to date.    Culture - Blood (collected 02 Jun 2021 05:18)  Source: .Blood Blood-Peripheral  Preliminary Report:    No growth to date.    Culture - Urine (collected 02 Jun 2021 03:48)  Source: .Urine Clean Catch (Midstream)  Final Report:    >100,000 CFU/ml Pseudomonas aeruginosa  Organism: Pseudomonas aeruginosa  Organism: Pseudomonas aeruginosa    Sensitivities:      -  Amikacin: S <=16      -  Aztreonam: I 16      -  Cefepime: S 8      -  Ceftazidime: S 4      -  Ciprofloxacin: R >2      -  Gentamicin: S <=2      -  Imipenem: I 4      -  Levofloxacin: R >4      -  Meropenem: I 4      -  Piperacillin/Tazobactam: S <=8      -  Tobramycin: S <=2      Method Type: AUTUMN    Culture - Blood (collected 09 Jul 2020 09:01)  Source: .Blood Blood-Peripheral  Final Report:    No Growth Final    Culture - Blood (collected 08 Jul 2020 14:50)  Source: .Blood Blood-Peripheral  Final Report:    No Growth Final    Culture - Urine (collected 05 Jul 2020 19:39)  Source: .Urine Clean Catch (Midstream)  Final Report:    10,000 - 49,000 CFU/mL Pseudomonas aeruginosa (Carbapenem Resistant)  Organism: Pseudomonas aeruginosa (Carbapenem Resistant)  Organism: Pseudomonas aeruginosa (Carbapenem Resistant)    Sensitivities:      -  Amikacin: S <=16      -  Aztreonam: R >16      -  Cefepime: S 8      -  Ceftazidime: S 4      -  Ciprofloxacin: S <=1      -  Gentamicin: S <=4      -  Imipenem: R 8      -  Levofloxacin: S <=2      -  Meropenem: R >8      -  Piperacillin/Tazobactam: S <=16      -  Tobramycin: S <=4      Method Type: AUTUMN    Culture - Blood (collected 05 Jul 2020 16:38)  Source: .Blood Blood-Peripheral  Final Report:    Growth in aerobic bottle: Pseudomonas aeruginosa    ***Blood Panel PCR results on this specimen are available    approximately 3 hours after the Gram stain result.***    Gram stain, PCR, and/or culture results may not always    correspond due to differencein methodologies.    ************************************************************    This PCR assay was performed using [employer].    The following targets are tested for: Enterococcus,    vancomycin resistant enterococci, Listeria monocytogenes,    coagulase negative staphylococci, S. aureus,    methicillin resistant S. aureus, Streptococcus agalactiae    (Group B), S. pneumoniae, S. pyogenes (Group A),    Acinetobacter baumannii, Enterobacter cloacae, E. coli,    Klebsiella oxytoca, K. pneumoniae, Proteus sp.,    Serratia marcescens, Haemophilus influenzae,    Neisseria meningitidis, Pseudomonas aeruginosa, Candida    albicans, C. glabrata, C krusei, C parapsilosis,    C. tropicalis and the KPC resistance gene.    "Due to technical problems, Proteus sp. will Not be reported as part of    the BCID panel until further notice"  Organism: Blood Culture PCR  Pseudomonas aeruginosa  Organism: Pseudomonas aeruginosa    Sensitivities:      -  Amikacin: S <=16      -  Aztreonam: R >16      -  Cefepime: S 8      -  Ceftazidime: S 8      -  Ciprofloxacin: I 0.5      -  Gentamicin: S <=2      -  Levofloxacin: R 2      -  Meropenem: S <=1      -  Piperacillin/Tazobactam: S 16      -  Tobramycin: S <=2      Method Type: AUTUMN  Organism: Blood Culture PCR    Sensitivities:      -  Pseudomonas aeruginosa: Detec      Method Type: PCR    Culture - Blood (collected 05 Jul 2020 16:38)  Source: .Blood Blood-Peripheral  Final Report:    Growth in aerobic bottle: Pseudomonas aeruginosa    See previous culture [id number]    Culture - Blood (collected 26 Feb 2020 09:04)  Source: .Blood Blood  Final Report:    No growth at 5 days.    Culture - Blood (collected 26 Feb 2020 09:04)  Source: .Blood Blood  Final Report:    No growth at 5 days.      COVID-19 PCR: NotDetec (06-01-21 @ 23:22)  COVID-19 PCR: NotDetec (04-20-21 @ 16:51)    COVID-19 Deway[facility] Domain AB Interp: Positive (06-03-21 @ 09:02)    Ferritin, Serum: 450 (06-02)    Blood Gas Venous - Lactate: 0.6 (06-01 @ 22:01)          RADIOLOGY:    <The imaging below has been reviewed and visualized by me independently. Findings as detailed in report below>    EXAM:  US KIDNEY TRANSPLANT W DOPP RT                        PROCEDURE DATE:  06/02/2021    Elevated postvoid residual with mild to moderate hydronephrosis. Circumferential bladder wall and urothelial thickening with mild bladder wall trabeculations may be secondary to some degree of chronic bladder outlet obstruction. However, correlate with urinalysis.  No evidence of a significant renal artery stenosis.

## 2021-06-04 NOTE — PROGRESS NOTE ADULT - SUBJECTIVE AND OBJECTIVE BOX
HOSPITALIST NOTE    Dr. Gary Olson DO  Attending Physician  Division of Hospital Medicine  [facility]  Pager:  [phone]    SUBJECTIVE  No acute complaints.    REVIEW OF SYSTEMS  10 point review of systems negative except for above.     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 s/p Reversal    S/P right knee arthroscopy  &#x27; 90&#x27;s    Kidney transplant recipient  2018  @ [facility] :  +  Hep C Donor        MEDICATIONS  (STANDING):  allopurinol 100 milliGRAM(s) Oral daily  citric acid/sodium citrate Solution 30 milliLiter(s) Oral two times a day  dextrose 50% Injectable 25 Gram(s) IV Push once  dextrose 50% Injectable 25 Gram(s) IV Push once  heparin   Injectable 5000 Unit(s) SubCutaneous every 8 hours  insulin lispro (ADMELOG) corrective regimen sliding scale   SubCutaneous three times a day before meals  insulin lispro (ADMELOG) corrective regimen sliding scale   SubCutaneous at bedtime  magnesium oxide 400 milliGRAM(s) Oral daily  metoprolol succinate  milliGRAM(s) Oral daily  mycophenolate mofetil 500 milliGRAM(s) Oral two times a day  NIFEdipine XL 60 milliGRAM(s) Oral daily  piperacillin/tazobactam IVPB.. 3.375 Gram(s) IV Intermittent every 12 hours  predniSONE   Tablet 5 milliGRAM(s) Oral daily  Relugolix (Orgovyx) tablets 120 milliGRAM(s) 120 milliGRAM(s) Oral daily  tacrolimus ER Tablet (ENVARSUS XR) 7 milliGRAM(s) Oral <User Schedule>  tamsulosin 0.4 milliGRAM(s) Oral at bedtime  trimethoprim   80 mG/sulfamethoxazole 400 mG 1 Tablet(s) Oral daily    MEDICATIONS  (PRN):      Allergies    No Known Allergies    Intolerances        T(C): 36.7 (21 @ 12:49), Max: 36.8 (21 @ 20:36)  T(F): 98.1 (21 @ 12:49), Max: 98.3 (21 @ 20:36)  HR: 69 (21 @ 12:49) (62 - 70)  BP: 111/50 (21 @ 12:49) (111/50 - 127/66)  ABP: --  ABP(mean): --  RR: 18 (21 @ 12:49) (18 - 18)  SpO2: 97% (21 @ 12:49) (97% - 99%)      CONSTITUTIONAL: No acute distress.   HEENT:  Conjunctiva clear B/L.  Moist oral mucosa.   Cardiovascular: RRR with no murmurs. No JVD noted. No lower extremity edema B/L. Extremities are warm and well perfused. Radial pulses 2+ B/L. Dorsalis pedis pulses 2+ B/L.    Respiratory: Lungs CTAB. No wrr. No accessory muscle use.   Gastrointestinal:  Soft, nontender. Non-distended. Non-rigid. No CVA tenderness B/L.  MSK:  No joint swelling. No joint erythema B/L. No midline spinal tenderness.  Neurologic:  Alert and awake. Moving all extremities. Following commands. Making eye contact.    Skin:  No rashes noted. No skin erythema noted.   Psych:  Normal affect. Normal Mood.   : indwelling armenta     LABS                        10.3   4.88  )-----------( 135      ( 2021 06:36 )             32.0     06-04    139  |  107  |  50<H>  ----------------------------<  115<H>  4.6   |  18<L>  |  3.81<H>    Ca    10.4      2021 06:36            ADDITIONAL STUDIES PERSONALLY REVIEWED    Our team discussed the case with all consults    All consultant contribution to care greatly appreciated.

## 2021-06-04 NOTE — PROGRESS NOTE ADULT - SUBJECTIVE AND OBJECTIVE BOX
[facility] DIVISION OF KIDNEY DISEASES AND HYPERTENSION -- FOLLOW UP NOTE  --------------------------------------------------------------------------------  Luis F Grullon   Nephrology Fellow  Pager NS: [phone]/ [facility]: [phone]  (After 5 pm or on weekends please page the on-call fellow, can view the schedule on [place]. Login is marimar zamudio, schedule under [facility] medicine, psych, derm.      Patient is a 70y old  Male who presents with a chief complaint of Sent in for Hyperkalemia and worsening renal function (03 Jun 2021 11:24)      24 hour events/subjective: Patient seen and examined at the bedside. Vital signs, labs, medications reviewed. Scr downtrending, 3.8mg/dl. No complaints noted.        PAST HISTORY  --------------------------------------------------------------------------------  No significant changes to PMH, PSH, FHx, SHx, unless otherwise noted    ALLERGIES & MEDICATIONS  --------------------------------------------------------------------------------  Allergies    No Known Allergies    Intolerances      Standing Inpatient Medications  allopurinol 100 milliGRAM(s) Oral daily  citric acid/sodium citrate Solution 30 milliLiter(s) Oral two times a day  dextrose 40% Gel 15 Gram(s) Oral once  dextrose 5%. 1000 milliLiter(s) IV Continuous <Continuous>  dextrose 5%. 1000 milliLiter(s) IV Continuous <Continuous>  dextrose 50% Injectable 25 Gram(s) IV Push once  dextrose 50% Injectable 12.5 Gram(s) IV Push once  dextrose 50% Injectable 25 Gram(s) IV Push once  glucagon  Injectable 1 milliGRAM(s) IntraMuscular once  heparin   Injectable 5000 Unit(s) SubCutaneous every 8 hours  insulin lispro (ADMELOG) corrective regimen sliding scale   SubCutaneous three times a day before meals  insulin lispro (ADMELOG) corrective regimen sliding scale   SubCutaneous at bedtime  lactated ringers. 1000 milliLiter(s) IV Continuous <Continuous>  magnesium oxide 400 milliGRAM(s) Oral daily  metoprolol succinate  milliGRAM(s) Oral daily  mycophenolate mofetil 500 milliGRAM(s) Oral two times a day  NIFEdipine XL 60 milliGRAM(s) Oral daily  piperacillin/tazobactam IVPB.. 3.375 Gram(s) IV Intermittent every 12 hours  predniSONE   Tablet 5 milliGRAM(s) Oral daily  Relugolix (Orgovyx) tablets 120 milliGRAM(s) 120 milliGRAM(s) Oral daily  tacrolimus ER Tablet (ENVARSUS XR) 7 milliGRAM(s) Oral <User Schedule>  tamsulosin 0.4 milliGRAM(s) Oral at bedtime  trimethoprim   80 mG/sulfamethoxazole 400 mG 1 Tablet(s) Oral daily    PRN Inpatient Medications      REVIEW OF SYSTEMS  --------------------------------------------------------------------------------  Gen: No fevers/chills  Skin: No rashes  Head/Eyes/Ears: Normal hearing, no difficulty seeing  Respiratory: No dyspnea, cough  CV: No chest pain  GI: No abdominal pain, diarrhea  : No dysuria, hematuria  MSK: No  edema    >>> <<<    VITALS/PHYSICAL EXAM  --------------------------------------------------------------------------------  T(C): 36.5 (06-04-21 @ 04:53), Max: 36.8 (06-03-21 @ 20:36)  HR: 62 (06-04-21 @ 04:53) (62 - 70)  BP: 127/66 (06-04-21 @ 04:53) (116/59 - 127/66)  RR: 18 (06-04-21 @ 04:53) (18 - 18)  SpO2: 99% (06-04-21 @ 04:53) (98% - 99%)  Wt(kg): --        06-03-21 @ 07:01  -  06-04-21 @ 07:00  --------------------------------------------------------  IN: 480 mL / OUT: 850 mL / NET: -370 mL      Physical Exam:    	Gen: NAD  	HEENT: Anicteric  	Pulm: CTA B/L  	CV: S1S2  	Abd: Soft, +BS               Allograft: No TTP              : + Velez with clear urine  	MSK: No LE edema B/L  	Neuro: Awake  	Skin: Warm and dry  	Vascular access: UE AVF      LABS/STUDIES  --------------------------------------------------------------------------------              10.3   4.88  >-----------<  135      [06-04-21 @ 06:36]              32.0     139  |  107  |  50  ----------------------------<  115      [06-04-21 @ 06:36]  4.6   |  18  |  3.81        Ca     10.4     [06-04-21 @ 06:36]    TPro  7.8  /  Alb  4.2  /  TBili  0.2  /  DBili  x   /  AST  22  /  ALT  14  /  AlkPhos  87  [06-02-21 @ 14:23]          Creatinine Trend:  SCr 3.81 [06-04 @ 06:36]  SCr 4.52 [06-03 @ 06:46]  SCr 4.88 [06-02 @ 14:23]  SCr 5.21 [06-02 @ 06:27]  SCr 5.31 [06-02 @ 01:23]    Urinalysis - [06-01-21 @ 23:37]      Color Yellow / Appearance Turbid / SG 1.018 / pH 6.0      Gluc Negative / Ketone Negative  / Bili Negative / Urobili Negative       Blood Large / Protein 100 mg/dL / Leuk Est Large / Nitrite Positive      RBC 30 /  / Hyaline 4 / Gran  / Sq Epi  / Non Sq Epi 1 / Bacteria Negative    Urine Creatinine 169      [06-02-21 @ 06:38]  Urine Protein 95      [06-02-21 @ 06:38]  Urine Sodium 65      [06-02-21 @ 06:38]  Urine Urea Nitrogen 560      [06-02-21 @ 08:31]    Iron 75, TIBC 278, %sat 27      [06-02-21 @ 16:51]  Ferritin 450      [06-02-21 @ 17:06]  PTH -- (Ca --)      [06-02-21 @ 17:06]   15  HbA1c 6.0      [08-09-19 @ 23:48]

## 2021-06-04 NOTE — PROGRESS NOTE ADULT - TIME BILLING
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 UTI CHELA Ureter stent  Patient seen, examined and reviewed available clinical and lab data .  Reviewed immunosuppression and allograft function   and   imaging.  Reviewed medication regimen for comorbidities  Suggestions:  Will monitor for allograft function recovery  Agree with antibiotic regimen  Continue current immunosuppression, Tac level target 4-6 ng/ml   Will follow  I was present during and reviewed clinical and lab data as well as assessment and plan as documented by the house staff as noted. Please contact if any additional questions with any change in clinical condition or on availability of any additional information or reports.
Kidney Transplant recipient with functioning allograft  CHELA, CKD, Downtrending creatinine, UTI  Creatinine trend noted  Comorbidities reviewed.  Patient seen, examined and reviewed available clinical and lab data including history,  progress notes and consult notes.  Reviewed immunosuppression and allograft function including urine out put, creatinine trend, urine studies and any allograft and bladder imaging.  Reviewed medication regimen for glycemic control and blood pressure control  Suggestions:  Continue current immunosuppression, antibiotic  Monitor allograft function  Will follow  I was present during and reviewed clinical and lab data as well as assessment and plan as documented by the housestaff as noted. Please contact if any additional questions with any change in clinical condition or on availability of any additional information or reports.

## 2021-06-04 NOTE — PROGRESS NOTE ADULT - ASSESSMENT
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 Ucx positive for pseudomonas.  - S/p armenta and zosyn.  - F/u bcx from admission  - Will need to discuss with urology regarding ureteral stent exchange   - Renally dose medications to eGFR<10        #DDRT  Pts aaron creatinine 1.9 but had CHELA with pyelonephritis. Has had multiple episodes of CHELA. Pt in process of relisting but on hold due to prostate cancer. Cell free DNA 0.2% in October 2020. Scr was 3.2-3.4mg/dl on last outpt visit in early may 2021. Tx'ed with cipro for pseudomonas UTI  - Admitted with Cr of 5.5mg/dl, improving to 4.5mg/dl today  - Renal US with moderate hydronephrosis and bladder wall thickening  - Ucx +, on Zosyn  - C/w Envarsus 7mg QD, check a tacro trough 30 mins prior to giving the dose. C/w cellcept 500mg BID, prednisone 5mg QD  - C/w bactrim PPX, 1 single strength tablet QD  - Unable to tolerate full dose cellcept due to leukopenia and diarrhea    #Immunosuppression  - Non-toxic appearing  - C/w Envarsus 7mg QD, check a tacro trough 30 mins prior to giving the dose. C/w cellcept 500mg BID, prednisone 5mg QD  - Goal trough 4-6      #Hyperkalemia  - 2/2 CHELA, now resolved. K WNL  - Low K diet  - c/w bicitra, changed to 30ml BID     70 y.o male with a pmhx of DM, HTN, ESRD since July 2015 after bilateral nephrectomies for RCC s/p DDRT 7/2018 who presents for CHELA and hyperkalemia      #CHELA in setting of DDRT 7/2018  - Possibly in the setting of UTI, clinically pt asymptomatic but UA is grossly positive and he was tx'ed for pseudomonal UTI in April with Cipro  - Transplant sono with hydronephrosis and bladder wall thickening. Ucx positive for pseudomonas. BCX with NGTD  - S/p armenta and zosyn.  - Will need to discuss with urology regarding ureteral stent exchange   - Renally dose medications to eGFR<15        #DDRT  Pts aaron creatinine 1.9 but had CHELA with pyelonephritis. Has had multiple episodes of CHELA. Pt in process of relisting but on hold due to prostate cancer. Cell free DNA 0.2% in October 2020. Scr was 3.2-3.4mg/dl on last outpt visit in early may 2021. Tx'ed with cipro for pseudomonas UTI  - Admitted with Cr of 5.5mg/dl, improving to 3.8mg/dl today  - Renal US with moderate hydronephrosis and bladder wall thickening  - Ucx +, on Zosyn  - C/w Envarsus 7mg QD, check a tacro trough 30 mins prior to giving the dose. C/w cellcept 500mg BID, prednisone 5mg QD  - C/w bactrim PPX, 1 single strength tablet QD  - Unable to tolerate full dose cellcept due to leukopenia and diarrhea    #Immunosuppression  - Non-toxic appearing  - C/w Envarsus 7mg QD, check a tacro trough 30 mins prior to giving the dose. C/w cellcept 500mg BID, prednisone 5mg QD  - Goal trough 4-6      #Hyperkalemia  - 2/2 CHELA, now resolved. K WNL  - Low K diet  - c/w bicitra, changed to 30ml BID

## 2021-06-04 NOTE — PROGRESS NOTE ADULT - ASSESSMENT
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 He was recently treated for two weeks with ciprofloxacin for pseudomonas urinary tract infection (known to be colonized). Patient denies dysuria or polyuria.    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  - Right iliac fossa transplant kidney with ureteral stent extending from the right renal pelvis into the bladder.  - Findings hydronephrosis and urothelial thickening of the renal transplant.  - Follow up CMV PCR viral load    #CHELA and hyperkalemia:  - Trend BMP    #Prostate cancer:  - On Orgovyx for prostate cancer. He is pending radiation therapy after finishing his oral therapy.    Clemente Oleary MD, PGY4   ID fellow  Pager: [phone]  After 5pm/weekends call [phone]     70M with PMHx of right renal transplant (~2018) hx of dysfunction due to strictures and known hydronephrosis, bilateral nephrectomy (secondary to RCC), prostate cancer (on active treatment), HTN, T2DM, hx of CMV viremia, PsA bacteremia in 2020 (blood PsA not CRE but urine PsA is CRE), chronic diarrhea (multiple scopes without etiology), was sent in by doctor for CHELA and hyperkalemia on routine blood work. He was recently treated for two weeks with ciprofloxacin for pseudomonas urinary tract infection (known to be colonized). Patient denies dysuria or polyuria.    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  - Right iliac fossa transplant kidney with ureteral stent extending from the right renal pelvis into the bladder.  - Findings hydronephrosis and urothelial thickening of the renal transplant.  - Follow up CMV PCR viral load    #CHELA and hyperkalemia:  - Trend BMP    #Prostate cancer:  - On Orgovyx for prostate cancer. He is pending radiation therapy after finishing his oral therapy.    Clemente Oleary MD, PGY4   ID fellow  Pager: [phone]  After 5pm/weekends call [phone]    d/w Dr. Hall  Recs conveyed to primary team

## 2021-06-04 NOTE — PROGRESS NOTE ADULT - PROBLEM SELECTOR PLAN 4
-Continue with home Prednisone, CellCept, and Tacrolimus  -F/U tacro level  -Continue with Mag Oxide  -F/U CMV PCR  -Continue with Bactrim PPx  -Follow up with Transplant ID & Nephrology -Continue with home Prednisone, CellCept, and Tacrolimus  -F/U tacro level  -C/w current tacro dosing. R  -Continue with Bactrim PPx  -Follow up with Transplant ID & Nephrology

## 2021-06-04 NOTE — PROGRESS NOTE ADULT - PROBLEM SELECTOR PLAN 8
-Continue with Nifedipine    #Diarrhea  -Possible from Mag oxide?    -Not today. -Continue with Nifedipine    #Diarrhea  -resolved.    Plan d/w med np.

## 2021-06-04 NOTE — PROGRESS NOTE ADULT - PROBLEM SELECTOR PLAN 1
Prerenal in setting of complicated uti. Also post renal. Imaging noted.  -Transplant Nephrology consulted and will continue with Envarsus, Prednisone and Cellcept  -urine cx showing zosyn sensitive pseudomonas again  -need urology input into benefit of stent exchange.  -Renal Transplant and ID following Prerenal in setting of complicated uti. Also post renal. Imaging noted.  -Transplant Nephrology consulted and will continue with Envarsus, Prednisone and Cellcept  -urine cx showing zosyn sensitive pseudomonas again  -need urology input into benefit of stent exchange.  -Follow up with Transplant ID & Nephrology. Assistance greatly appreciated.

## 2021-06-04 NOTE — PROGRESS NOTE ADULT - PROBLEM SELECTOR PLAN 5
Possibly due to renal dysfunction, but given prostate cancer need to consider bony mets as well  -normal PTH    -monitor level, currently asymptomatic

## 2021-06-04 NOTE — PROGRESS NOTE ADULT - PROBLEM SELECTOR PLAN 6
-F/U PSA  -Patient told Orgovyx is non-formulary and would need to supply his own- brought it in and needs oncology consult to place order only  (we attempted to place order last night but pharmacy requires oncology to do it)  -Pending outpatient follow up for radiation  -Urology consult for retention w/ cancer and also hydro w/ stents Orgovyx is non-formulary and would need to supply his own- brought it in and needs oncology consult to place order only.  -Pending outpatient follow up for radiation  -Urology consult for retention w/ cancer and also hydro w/ stents

## 2021-06-05 LAB
ANION GAP SERPL CALC-SCNC: 14 MMOL/L — SIGNIFICANT CHANGE UP (ref 5–17)
BUN SERPL-MCNC: 42 MG/DL — HIGH (ref 7–23)
CALCIUM SERPL-MCNC: 10.6 MG/DL — HIGH (ref 8.4–10.5)
CHLORIDE SERPL-SCNC: 107 MMOL/L — SIGNIFICANT CHANGE UP (ref 96–108)
CMV DNA CSF QL NAA+PROBE: SIGNIFICANT CHANGE UP
CO2 SERPL-SCNC: 18 MMOL/L — LOW (ref 22–31)
CREAT SERPL-MCNC: 3.58 MG/DL — HIGH (ref 0.5–1.3)
GLUCOSE BLDC GLUCOMTR-MCNC: 124 MG/DL — HIGH (ref 70–99)
GLUCOSE BLDC GLUCOMTR-MCNC: 137 MG/DL — HIGH (ref 70–99)
GLUCOSE BLDC GLUCOMTR-MCNC: 140 MG/DL — HIGH (ref 70–99)
GLUCOSE BLDC GLUCOMTR-MCNC: 163 MG/DL — HIGH (ref 70–99)
GLUCOSE SERPL-MCNC: 168 MG/DL — HIGH (ref 70–99)
HCT VFR BLD CALC: 34.9 % — LOW (ref 39–50)
HGB BLD-MCNC: 10.6 G/DL — LOW (ref 13–17)
MCHC RBC-ENTMCNC: 29.2 PG — SIGNIFICANT CHANGE UP (ref 27–34)
MCHC RBC-ENTMCNC: 30.4 GM/DL — LOW (ref 32–36)
MCV RBC AUTO: 96.1 FL — SIGNIFICANT CHANGE UP (ref 80–100)
NRBC # BLD: 0 /100 WBCS — SIGNIFICANT CHANGE UP (ref 0–0)
PLATELET # BLD AUTO: 122 K/UL — LOW (ref 150–400)
POTASSIUM SERPL-MCNC: 4.7 MMOL/L — SIGNIFICANT CHANGE UP (ref 3.5–5.3)
POTASSIUM SERPL-SCNC: 4.7 MMOL/L — SIGNIFICANT CHANGE UP (ref 3.5–5.3)
RBC # BLD: 3.63 M/UL — LOW (ref 4.2–5.8)
RBC # FLD: 14.6 % — HIGH (ref 10.3–14.5)
SODIUM SERPL-SCNC: 139 MMOL/L — SIGNIFICANT CHANGE UP (ref 135–145)
TACROLIMUS SERPL-MCNC: 5.8 NG/ML — SIGNIFICANT CHANGE UP
WBC # BLD: 5.16 K/UL — SIGNIFICANT CHANGE UP (ref 3.8–10.5)
WBC # FLD AUTO: 5.16 K/UL — SIGNIFICANT CHANGE UP (ref 3.8–10.5)

## 2021-06-05 PROCEDURE — 99232 SBSQ HOSP IP/OBS MODERATE 35: CPT

## 2021-06-05 PROCEDURE — 99233 SBSQ HOSP IP/OBS HIGH 50: CPT

## 2021-06-05 RX ADMIN — MAGNESIUM OXIDE 400 MG ORAL TABLET 400 MILLIGRAM(S): 241.3 TABLET ORAL at 12:21

## 2021-06-05 RX ADMIN — Medication 100 MILLIGRAM(S): at 06:35

## 2021-06-05 RX ADMIN — PIPERACILLIN AND TAZOBACTAM 25 GRAM(S): 4; .5 INJECTION, POWDER, LYOPHILIZED, FOR SOLUTION INTRAVENOUS at 19:12

## 2021-06-05 RX ADMIN — Medication 60 MILLIGRAM(S): at 06:35

## 2021-06-05 RX ADMIN — Medication 100 MILLIGRAM(S): at 12:21

## 2021-06-05 RX ADMIN — PIPERACILLIN AND TAZOBACTAM 25 GRAM(S): 4; .5 INJECTION, POWDER, LYOPHILIZED, FOR SOLUTION INTRAVENOUS at 06:34

## 2021-06-05 RX ADMIN — HEPARIN SODIUM 5000 UNIT(S): 5000 INJECTION INTRAVENOUS; SUBCUTANEOUS at 22:50

## 2021-06-05 RX ADMIN — HEPARIN SODIUM 5000 UNIT(S): 5000 INJECTION INTRAVENOUS; SUBCUTANEOUS at 13:41

## 2021-06-05 RX ADMIN — Medication 30 MILLILITER(S): at 06:35

## 2021-06-05 RX ADMIN — Medication 30 MILLILITER(S): at 17:43

## 2021-06-05 RX ADMIN — Medication 1 TABLET(S): at 12:21

## 2021-06-05 RX ADMIN — HEPARIN SODIUM 5000 UNIT(S): 5000 INJECTION INTRAVENOUS; SUBCUTANEOUS at 06:34

## 2021-06-05 RX ADMIN — MYCOPHENOLATE MOFETIL 500 MILLIGRAM(S): 250 CAPSULE ORAL at 17:42

## 2021-06-05 RX ADMIN — TACROLIMUS 7 MILLIGRAM(S): 5 CAPSULE ORAL at 11:04

## 2021-06-05 RX ADMIN — MYCOPHENOLATE MOFETIL 500 MILLIGRAM(S): 250 CAPSULE ORAL at 06:35

## 2021-06-05 RX ADMIN — TAMSULOSIN HYDROCHLORIDE 0.4 MILLIGRAM(S): 0.4 CAPSULE ORAL at 22:50

## 2021-06-05 RX ADMIN — Medication 5 MILLIGRAM(S): at 06:35

## 2021-06-05 NOTE — PROGRESS NOTE ADULT - PROBLEM SELECTOR PLAN 6
Orgovyx is non-formulary and would need to supply his own- brought it in and needs oncology consult to place order only.  -Pending outpatient follow up for radiation  -Uro input appreciated --> defer to t/p surg for stent mgmt. Will need their input during admission.

## 2021-06-05 NOTE — PROGRESS NOTE ADULT - PROBLEM SELECTOR PLAN 4
-Continue with home Prednisone, CellCept, and Tacrolimus  -F/U tacro level  -C/w current tacro dosing. R  -Continue with Bactrim PPx  -Follow up with Transplant ID & Nephrology

## 2021-06-05 NOTE — PROGRESS NOTE ADULT - PROBLEM SELECTOR PLAN 1
Prerenal in setting of complicated uti. Also post renal. Imaging noted.  -Transplant Nephrology consulted and will continue with Envarsus, Prednisone and Cellcept  -urine cx showing zosyn sensitive pseudomonas again  -Uro input appreciated --> defer to t/p surg for stent mgmt. Will need their input during admission.   -Follow up with Transplant ID & Nephrology. Assistance greatly appreciated.  -Cr steadily improving.

## 2021-06-05 NOTE — PROGRESS NOTE ADULT - SUBJECTIVE AND OBJECTIVE BOX
[facility] DIVISION OF KIDNEY DISEASES AND HYPERTENSION -- FOLLOW UP NOTE  --------------------------------------------------------------------------------  Authored by: Star Barrera   Cell # [phone]     JUAN CARLOS COPELAND was seen and examined at bedside.     24 hour events: No major events  subjective: C/o diarrhea - chronic and unchanged. no fever. Appetite fair. ambulating to the bathroom       Standing Inpatient Medications  allopurinol 100 milliGRAM(s) Oral daily  heparin   Injectable 5000 Unit(s) SubCutaneous every 8 hours  insulin lispro (ADMELOG) corrective regimen sliding scale   SubCutaneous three times a day before meals  insulin lispro (ADMELOG) corrective regimen sliding scale   SubCutaneous at bedtime  magnesium oxide 400 milliGRAM(s) Oral daily  metoprolol succinate  milliGRAM(s) Oral daily  mycophenolate mofetil 500 milliGRAM(s) Oral two times a day  NIFEdipine XL 60 milliGRAM(s) Oral daily  piperacillin/tazobactam IVPB.. 3.375 Gram(s) IV Intermittent every 12 hours  predniSONE   Tablet 5 milliGRAM(s) Oral daily  Relugolix (Orgovyx) tablets 120 milliGRAM(s) 120 milliGRAM(s) Oral daily  tacrolimus ER Tablet (ENVARSUS XR) 7 milliGRAM(s) Oral <User Schedule>  tamsulosin 0.4 milliGRAM(s) Oral at bedtime  trimethoprim   80 mG/sulfamethoxazole 400 mG 1 Tablet(s) Oral daily      VITALS/PHYSICAL EXAM  --------------------------------------------------------------------------------  T(C): 36.6 (06-05-21 @ 04:16), Max: 36.7 (06-04-21 @ 12:49)  HR: 62 (06-05-21 @ 04:16) (56 - 69)  BP: 148/55 (06-05-21 @ 04:16) (111/50 - 152/71)  RR: 18 (06-05-21 @ 04:16) (18 - 18)  SpO2: 99% (06-05-21 @ 04:16) (97% - 99%)      06-04-21 @ 07:01  -  06-05-21 @ 07:00  --------------------------------------------------------  IN: 320 mL / OUT: 2900 mL / NET: -2580 mL      Physical Exam:  Awake and alert, communicating clearly   Lungs clear   Regular S1 and S2  Abdomen soft and non tender  No LE edema   Left UE AVF + thrill   Velez in place clear urine   Non focal      LABS/STUDIES  --------------------------------------------------------------------------------              10.3   4.88  >-----------<  135      [06-04-21 @ 06:36]              32.0     139  |  107  |  50  ----------------------------<  115      [06-04-21 @ 06:36]  4.6   |  18  |  3.81        Ca     10.4     [06-04-21 @ 06:36]      Creatinine Trend:  SCr 3.81 [06-04 @ 06:36]  SCr 4.52 [06-03 @ 06:46]  SCr 4.88 [06-02 @ 14:23]  SCr 5.21 [06-02 @ 06:27]  SCr 5.31 [06-02 @ 01:23]    Tacrolimus (), Serum: 6.9 ng/mL (06-04 @ 10:11)  Tacrolimus (), Serum: 6.8 ng/mL (06-03 @ 10:16)      CAPILLARY BLOOD GLUCOSE  POCT Blood Glucose.: 124 mg/dL (05 Jun 2021 09:02)  POCT Blood Glucose.: 129 mg/dL (04 Jun 2021 17:31)  POCT Blood Glucose.: 180 mg/dL (04 Jun 2021 13:24)      Urinalysis - [06-01-21 @ 23:37]      Color Yellow / Appearance Turbid / SG 1.018 / pH 6.0      Gluc Negative / Ketone Negative  / Bili Negative / Urobili Negative       Blood Large / Protein 100 mg/dL / Leuk Est Large / Nitrite Positive      RBC 30 /  / Hyaline 4 / Gran  / Sq Epi  / Non Sq Epi 1 / Bacteria Negative    Urine Creatinine 169      [06-02-21 @ 06:38]  Urine Protein 95      [06-02-21 @ 06:38]  Urine Sodium 65      [06-02-21 @ 06:38]  Urine Urea Nitrogen 560      [06-02-21 @ 08:31]    Iron 75, TIBC 278, %sat 27      [06-02-21 @ 16:51]  Ferritin 450      [06-02-21 @ 17:06]  PTH -- (Ca 10.3)      [06-04-21 @ 09:37]   22  PTH -- (Ca --)      [06-02-21 @ 17:06]   15  Vitamin D (25OH) 37.0      [06-04-21 @ 09:37]  HbA1c 6.0      [08-09-19 @ 23:48]

## 2021-06-05 NOTE — PROGRESS NOTE ADULT - SUBJECTIVE AND OBJECTIVE BOX
HOSPITALIST NOTE    Dr. Gary Olson DO  Attending Physician  Division of Hospital Medicine  [facility]  Pager:  [phone]    SUBJECTIVE  No acute complaints.     REVIEW OF SYSTEMS  12 point review of systems negative except for above.     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 s/p Reversal    S/P right knee arthroscopy  &#x27; 90&#x27;s    Kidney transplant recipient  2018  @ [facility] :  +  Hep C Donor        MEDICATIONS  (STANDING):  allopurinol 100 milliGRAM(s) Oral daily  citric acid/sodium citrate Solution 30 milliLiter(s) Oral two times a day  dextrose 50% Injectable 25 Gram(s) IV Push once  dextrose 50% Injectable 25 Gram(s) IV Push once  heparin   Injectable 5000 Unit(s) SubCutaneous every 8 hours  insulin lispro (ADMELOG) corrective regimen sliding scale   SubCutaneous three times a day before meals  insulin lispro (ADMELOG) corrective regimen sliding scale   SubCutaneous at bedtime  magnesium oxide 400 milliGRAM(s) Oral daily  metoprolol succinate  milliGRAM(s) Oral daily  mycophenolate mofetil 500 milliGRAM(s) Oral two times a day  NIFEdipine XL 60 milliGRAM(s) Oral daily  piperacillin/tazobactam IVPB.. 3.375 Gram(s) IV Intermittent every 12 hours  predniSONE   Tablet 5 milliGRAM(s) Oral daily  Relugolix (Orgovyx) tablets 120 milliGRAM(s) 120 milliGRAM(s) Oral daily  tacrolimus ER Tablet (ENVARSUS XR) 7 milliGRAM(s) Oral <User Schedule>  tamsulosin 0.4 milliGRAM(s) Oral at bedtime  trimethoprim   80 mG/sulfamethoxazole 400 mG 1 Tablet(s) Oral daily    MEDICATIONS  (PRN):      Allergies    No Known Allergies    Intolerances        T(C): 36.7 (21 @ 12:30), Max: 36.7 (21 @ 21:35)  T(F): 98 (21 @ 12:30), Max: 98.1 (21 @ 21:35)  HR: 77 (21 @ 12:30) (56 - 77)  BP: 105/62 (21 @ 12:30) (105/62 - 152/71)  ABP: --  ABP(mean): --  RR: 16 (21 @ 12:30) (16 - 18)  SpO2: 100% (21 @ 12:30) (99% - 100%)      CONSTITUTIONAL: No acute distress.   HEENT:  Conjunctiva clear B/L.  Moist oral mucosa.   Cardiovascular: RRR with no murmurs. No JVD noted. No lower extremity edema B/L. Extremities are warm and well perfused. Radial pulses 2+ B/L. Dorsalis pedis pulses 2+ B/L.    Respiratory: Lungs CTAB. No wrr. No accessory muscle use.   Gastrointestinal:  Soft, nontender. Non-distended. Non-rigid. No CVA tenderness B/L.  Neurologic:  Alert and awake. Moving all extremities. Following commands. Making eye contact.    Skin:  No rashes noted. No skin erythema noted.   Psych:  Normal affect. Normal Mood.   : indwelling armenta       LABS                        10.6   5.16  )-----------( 122      ( 2021 10:39 )             34.9     -    139  |  107  |  42<H>  ----------------------------<  168<H>  4.7   |  18<L>  |  3.58<H>    Ca    10.6<H>      2021 10:39            ADDITIONAL STUDIES PERSONALLY REVIEWED    Our team discussed the case with all consults    All consultant contribution to care greatly appreciated.

## 2021-06-05 NOTE — PROGRESS NOTE ADULT - ASSESSMENT
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 He has active prostate cancer or rx with Orgovyx.       1. CHELA in  the setting of UTI and obstruction    - S/p armenta. Good UOP 2.9 liters. Creatinine improving. Electrolytes are fair.     2. Pseudomonas UTI resistant to fluoroquinolones. On Zosyn, Id following - needs rx for 7 days total     3. S/p DDRT in 2018. Giovanny creatinine 1.9 but recently 3.2-3.4mg/dL due to recurrent CHELA and pyelo.     4. Immunosuppression     - Continue Envarsus, Cellcept 500mg po bid, Prednisone 5mg daily. Tac level 6.9 yesterday at goal.     5. Chronic metabolic acidosis - continue bicitra   6. HTN fair control on Nifedipine and metoprolol

## 2021-06-06 LAB
ANION GAP SERPL CALC-SCNC: 14 MMOL/L — SIGNIFICANT CHANGE UP (ref 5–17)
BUN SERPL-MCNC: 37 MG/DL — HIGH (ref 7–23)
CALCIUM SERPL-MCNC: 10.3 MG/DL — SIGNIFICANT CHANGE UP (ref 8.4–10.5)
CHLORIDE SERPL-SCNC: 106 MMOL/L — SIGNIFICANT CHANGE UP (ref 96–108)
CO2 SERPL-SCNC: 17 MMOL/L — LOW (ref 22–31)
CREAT SERPL-MCNC: 3.32 MG/DL — HIGH (ref 0.5–1.3)
GLUCOSE BLDC GLUCOMTR-MCNC: 116 MG/DL — HIGH (ref 70–99)
GLUCOSE BLDC GLUCOMTR-MCNC: 134 MG/DL — HIGH (ref 70–99)
GLUCOSE BLDC GLUCOMTR-MCNC: 174 MG/DL — HIGH (ref 70–99)
GLUCOSE BLDC GLUCOMTR-MCNC: 189 MG/DL — HIGH (ref 70–99)
GLUCOSE SERPL-MCNC: 211 MG/DL — HIGH (ref 70–99)
POTASSIUM SERPL-MCNC: 5.3 MMOL/L — SIGNIFICANT CHANGE UP (ref 3.5–5.3)
POTASSIUM SERPL-SCNC: 5.3 MMOL/L — SIGNIFICANT CHANGE UP (ref 3.5–5.3)
SODIUM SERPL-SCNC: 137 MMOL/L — SIGNIFICANT CHANGE UP (ref 135–145)

## 2021-06-06 PROCEDURE — 99232 SBSQ HOSP IP/OBS MODERATE 35: CPT

## 2021-06-06 RX ADMIN — Medication 1: at 13:35

## 2021-06-06 RX ADMIN — Medication 100 MILLIGRAM(S): at 12:03

## 2021-06-06 RX ADMIN — MYCOPHENOLATE MOFETIL 500 MILLIGRAM(S): 250 CAPSULE ORAL at 18:23

## 2021-06-06 RX ADMIN — PIPERACILLIN AND TAZOBACTAM 25 GRAM(S): 4; .5 INJECTION, POWDER, LYOPHILIZED, FOR SOLUTION INTRAVENOUS at 06:57

## 2021-06-06 RX ADMIN — HEPARIN SODIUM 5000 UNIT(S): 5000 INJECTION INTRAVENOUS; SUBCUTANEOUS at 13:35

## 2021-06-06 RX ADMIN — Medication 30 MILLILITER(S): at 06:58

## 2021-06-06 RX ADMIN — Medication 1 TABLET(S): at 12:04

## 2021-06-06 RX ADMIN — MAGNESIUM OXIDE 400 MG ORAL TABLET 400 MILLIGRAM(S): 241.3 TABLET ORAL at 12:04

## 2021-06-06 RX ADMIN — HEPARIN SODIUM 5000 UNIT(S): 5000 INJECTION INTRAVENOUS; SUBCUTANEOUS at 21:38

## 2021-06-06 RX ADMIN — PIPERACILLIN AND TAZOBACTAM 25 GRAM(S): 4; .5 INJECTION, POWDER, LYOPHILIZED, FOR SOLUTION INTRAVENOUS at 18:23

## 2021-06-06 RX ADMIN — Medication 100 MILLIGRAM(S): at 06:56

## 2021-06-06 RX ADMIN — Medication 60 MILLIGRAM(S): at 06:56

## 2021-06-06 RX ADMIN — TAMSULOSIN HYDROCHLORIDE 0.4 MILLIGRAM(S): 0.4 CAPSULE ORAL at 21:38

## 2021-06-06 RX ADMIN — MYCOPHENOLATE MOFETIL 500 MILLIGRAM(S): 250 CAPSULE ORAL at 06:56

## 2021-06-06 RX ADMIN — HEPARIN SODIUM 5000 UNIT(S): 5000 INJECTION INTRAVENOUS; SUBCUTANEOUS at 06:57

## 2021-06-06 RX ADMIN — TACROLIMUS 7 MILLIGRAM(S): 5 CAPSULE ORAL at 09:16

## 2021-06-06 RX ADMIN — Medication 30 MILLILITER(S): at 18:21

## 2021-06-06 RX ADMIN — Medication 5 MILLIGRAM(S): at 06:57

## 2021-06-06 NOTE — PROGRESS NOTE ADULT - PROBLEM SELECTOR PLAN 4
-Continue with home Prednisone, CellCept, and Tacrolimus  -F/U tacro level w/ dosing.   -C/w current tacro dosing.   -Continue with Bactrim PPx  -Follow up with Transplant ID & Nephrology

## 2021-06-06 NOTE — PROGRESS NOTE ADULT - SUBJECTIVE AND OBJECTIVE BOX
HOSPITALIST NOTE    Dr. Gary Olson DO  Attending Physician  Division of Hospital Medicine  [facility]  Pager:  [phone]    SUBJECTIVE  No acute complaints.    REVIEW OF SYSTEMS  12 point review of systems negative except for above.     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 s/p Reversal    S/P right knee arthroscopy  &#x27; 90&#x27;s    Kidney transplant recipient  2018  @ [facility] :  +  Hep C Donor        MEDICATIONS  (STANDING):  allopurinol 100 milliGRAM(s) Oral daily  citric acid/sodium citrate Solution 30 milliLiter(s) Oral two times a day  dextrose 50% Injectable 25 Gram(s) IV Push once  dextrose 50% Injectable 25 Gram(s) IV Push once  heparin   Injectable 5000 Unit(s) SubCutaneous every 8 hours  insulin lispro (ADMELOG) corrective regimen sliding scale   SubCutaneous three times a day before meals  insulin lispro (ADMELOG) corrective regimen sliding scale   SubCutaneous at bedtime  magnesium oxide 400 milliGRAM(s) Oral daily  metoprolol succinate  milliGRAM(s) Oral daily  mycophenolate mofetil 500 milliGRAM(s) Oral two times a day  NIFEdipine XL 60 milliGRAM(s) Oral daily  piperacillin/tazobactam IVPB.. 3.375 Gram(s) IV Intermittent every 12 hours  predniSONE   Tablet 5 milliGRAM(s) Oral daily  Relugolix (Orgovyx) tablets 120 milliGRAM(s) 120 milliGRAM(s) Oral daily  tacrolimus ER Tablet (ENVARSUS XR) 7 milliGRAM(s) Oral <User Schedule>  tamsulosin 0.4 milliGRAM(s) Oral at bedtime  trimethoprim   80 mG/sulfamethoxazole 400 mG 1 Tablet(s) Oral daily    MEDICATIONS  (PRN):      Allergies    No Known Allergies    Intolerances        T(C): 36.6 (21 @ 12:42), Max: 36.7 (21 @ 21:09)  T(F): 97.8 (21 @ 12:42), Max: 98 (21 @ 21:09)  HR: 81 (21 @ 12:42) (65 - 81)  BP: 123/64 (21 @ 12:42) (123/64 - 133/65)  ABP: --  ABP(mean): --  RR: 19 (21 @ 12:42) (18 - 19)  SpO2: 99% (21 @ 12:42) (97% - 99%)      CONSTITUTIONAL: No acute distress.   HEENT:  Conjunctiva clear B/L.  Moist oral mucosa.   Cardiovascular: RRR with no murmurs. No JVD noted. No lower extremity edema B/L. Extremities are warm and well perfused.   Respiratory: Lungs CTAB. No wrr. No accessory muscle use.   Gastrointestinal:  Soft, nontender. Non-distended. Non-rigid.   Neurologic:  Alert and awake. Moving all extremities. Following commands. Making eye contact.    Skin:  No rashes noted. No skin erythema noted.   Psych:  Normal affect. Normal Mood.   : indwelling armenta.    LABS                        10.6   5.16  )-----------( 122      ( 2021 10:39 )             34.9     -    137  |  106  |  37<H>  ----------------------------<  211<H>  5.3   |  17<L>  |  3.32<H>    Ca    10.3      2021 11:00            ADDITIONAL STUDIES PERSONALLY REVIEWED    Our team discussed the case with all consults    All consultant contribution to care greatly appreciated.

## 2021-06-07 LAB
A1C WITH ESTIMATED AVERAGE GLUCOSE RESULT: 6.4 % — HIGH (ref 4–5.6)
ANION GAP SERPL CALC-SCNC: 13 MMOL/L — SIGNIFICANT CHANGE UP (ref 5–17)
BKV DNA SPEC QL NAA+PROBE: SIGNIFICANT CHANGE UP
BUN SERPL-MCNC: 36 MG/DL — HIGH (ref 7–23)
CALCIUM SERPL-MCNC: 10.2 MG/DL — SIGNIFICANT CHANGE UP (ref 8.4–10.5)
CHLORIDE SERPL-SCNC: 105 MMOL/L — SIGNIFICANT CHANGE UP (ref 96–108)
CO2 SERPL-SCNC: 19 MMOL/L — LOW (ref 22–31)
CREAT SERPL-MCNC: 3.22 MG/DL — HIGH (ref 0.5–1.3)
CULTURE RESULTS: SIGNIFICANT CHANGE UP
CULTURE RESULTS: SIGNIFICANT CHANGE UP
ESTIMATED AVERAGE GLUCOSE: 137 MG/DL — HIGH (ref 68–114)
GLUCOSE BLDC GLUCOMTR-MCNC: 139 MG/DL — HIGH (ref 70–99)
GLUCOSE BLDC GLUCOMTR-MCNC: 151 MG/DL — HIGH (ref 70–99)
GLUCOSE BLDC GLUCOMTR-MCNC: 151 MG/DL — HIGH (ref 70–99)
GLUCOSE BLDC GLUCOMTR-MCNC: 161 MG/DL — HIGH (ref 70–99)
GLUCOSE SERPL-MCNC: 209 MG/DL — HIGH (ref 70–99)
HCT VFR BLD CALC: 35.9 % — LOW (ref 39–50)
HGB BLD-MCNC: 10.9 G/DL — LOW (ref 13–17)
LOG10 BK QUANTITATION PCR: SIGNIFICANT CHANGE UP
MCHC RBC-ENTMCNC: 28.1 PG — SIGNIFICANT CHANGE UP (ref 27–34)
MCHC RBC-ENTMCNC: 30.4 GM/DL — LOW (ref 32–36)
MCV RBC AUTO: 92.5 FL — SIGNIFICANT CHANGE UP (ref 80–100)
NRBC # BLD: 0 /100 WBCS — SIGNIFICANT CHANGE UP (ref 0–0)
PLATELET # BLD AUTO: 160 K/UL — SIGNIFICANT CHANGE UP (ref 150–400)
POTASSIUM SERPL-MCNC: 4.9 MMOL/L — SIGNIFICANT CHANGE UP (ref 3.5–5.3)
POTASSIUM SERPL-SCNC: 4.9 MMOL/L — SIGNIFICANT CHANGE UP (ref 3.5–5.3)
RBC # BLD: 3.88 M/UL — LOW (ref 4.2–5.8)
RBC # FLD: 14.6 % — HIGH (ref 10.3–14.5)
SODIUM SERPL-SCNC: 137 MMOL/L — SIGNIFICANT CHANGE UP (ref 135–145)
SPECIMEN SOURCE: SIGNIFICANT CHANGE UP
SPECIMEN SOURCE: SIGNIFICANT CHANGE UP
TACROLIMUS SERPL-MCNC: 6.4 NG/ML — SIGNIFICANT CHANGE UP
VIT D25+D1,25 OH+D1,25 PNL SERPL-MCNC: 25.3 PG/ML — SIGNIFICANT CHANGE UP (ref 19.9–79.3)
WBC # BLD: 5.01 K/UL — SIGNIFICANT CHANGE UP (ref 3.8–10.5)
WBC # FLD AUTO: 5.01 K/UL — SIGNIFICANT CHANGE UP (ref 3.8–10.5)

## 2021-06-07 PROCEDURE — 99232 SBSQ HOSP IP/OBS MODERATE 35: CPT

## 2021-06-07 PROCEDURE — 99233 SBSQ HOSP IP/OBS HIGH 50: CPT

## 2021-06-07 RX ORDER — PIPERACILLIN AND TAZOBACTAM 4; .5 G/20ML; G/20ML
3.38 INJECTION, POWDER, LYOPHILIZED, FOR SOLUTION INTRAVENOUS EVERY 12 HOURS
Refills: 0 | Status: DISCONTINUED | OUTPATIENT
Start: 2021-06-07 | End: 2021-06-08

## 2021-06-07 RX ORDER — PIPERACILLIN AND TAZOBACTAM 4; .5 G/20ML; G/20ML
3.38 INJECTION, POWDER, LYOPHILIZED, FOR SOLUTION INTRAVENOUS EVERY 8 HOURS
Refills: 0 | Status: DISCONTINUED | OUTPATIENT
Start: 2021-06-07 | End: 2021-06-07

## 2021-06-07 RX ORDER — MYCOPHENOLATE MOFETIL 250 MG/1
250 CAPSULE ORAL
Refills: 0 | Status: DISCONTINUED | OUTPATIENT
Start: 2021-06-07 | End: 2021-06-09

## 2021-06-07 RX ADMIN — PIPERACILLIN AND TAZOBACTAM 25 GRAM(S): 4; .5 INJECTION, POWDER, LYOPHILIZED, FOR SOLUTION INTRAVENOUS at 18:00

## 2021-06-07 RX ADMIN — Medication 1: at 17:58

## 2021-06-07 RX ADMIN — Medication 60 MILLIGRAM(S): at 05:28

## 2021-06-07 RX ADMIN — Medication 1: at 13:22

## 2021-06-07 RX ADMIN — HEPARIN SODIUM 5000 UNIT(S): 5000 INJECTION INTRAVENOUS; SUBCUTANEOUS at 05:28

## 2021-06-07 RX ADMIN — Medication 30 MILLILITER(S): at 05:29

## 2021-06-07 RX ADMIN — HEPARIN SODIUM 5000 UNIT(S): 5000 INJECTION INTRAVENOUS; SUBCUTANEOUS at 13:21

## 2021-06-07 RX ADMIN — MAGNESIUM OXIDE 400 MG ORAL TABLET 400 MILLIGRAM(S): 241.3 TABLET ORAL at 13:21

## 2021-06-07 RX ADMIN — Medication 5 MILLIGRAM(S): at 05:28

## 2021-06-07 RX ADMIN — TACROLIMUS 7 MILLIGRAM(S): 5 CAPSULE ORAL at 10:21

## 2021-06-07 RX ADMIN — Medication 100 MILLIGRAM(S): at 13:21

## 2021-06-07 RX ADMIN — HEPARIN SODIUM 5000 UNIT(S): 5000 INJECTION INTRAVENOUS; SUBCUTANEOUS at 21:28

## 2021-06-07 RX ADMIN — MYCOPHENOLATE MOFETIL 500 MILLIGRAM(S): 250 CAPSULE ORAL at 05:28

## 2021-06-07 RX ADMIN — TAMSULOSIN HYDROCHLORIDE 0.4 MILLIGRAM(S): 0.4 CAPSULE ORAL at 21:28

## 2021-06-07 RX ADMIN — Medication 100 MILLIGRAM(S): at 05:28

## 2021-06-07 RX ADMIN — MYCOPHENOLATE MOFETIL 250 MILLIGRAM(S): 250 CAPSULE ORAL at 18:00

## 2021-06-07 RX ADMIN — Medication 1 TABLET(S): at 13:21

## 2021-06-07 RX ADMIN — Medication 30 MILLILITER(S): at 17:59

## 2021-06-07 RX ADMIN — PIPERACILLIN AND TAZOBACTAM 25 GRAM(S): 4; .5 INJECTION, POWDER, LYOPHILIZED, FOR SOLUTION INTRAVENOUS at 05:29

## 2021-06-07 NOTE — PROGRESS NOTE ADULT - SUBJECTIVE AND OBJECTIVE BOX
Patient is a 70y old  Male who presents with a chief complaint of Sent in for Hyperkalemia and worsening renal function (07 Jun 2021 10:30)      SUBJECTIVE / OVERNIGHT EVENTS:   NO fever   Tele reviewed:       ADDITIONAL REVIEW OF SYSTEMS: Negative except for above    MEDICATIONS  (STANDING):  allopurinol 100 milliGRAM(s) Oral daily  citric acid/sodium citrate Solution 30 milliLiter(s) Oral two times a day  dextrose 50% Injectable 25 Gram(s) IV Push once  dextrose 50% Injectable 25 Gram(s) IV Push once  heparin   Injectable 5000 Unit(s) SubCutaneous every 8 hours  insulin lispro (ADMELOG) corrective regimen sliding scale   SubCutaneous three times a day before meals  insulin lispro (ADMELOG) corrective regimen sliding scale   SubCutaneous at bedtime  magnesium oxide 400 milliGRAM(s) Oral daily  metoprolol succinate  milliGRAM(s) Oral daily  mycophenolate mofetil 250 milliGRAM(s) Oral two times a day  NIFEdipine XL 60 milliGRAM(s) Oral daily  piperacillin/tazobactam IVPB.. 3.375 Gram(s) IV Intermittent every 12 hours  predniSONE   Tablet 5 milliGRAM(s) Oral daily  Relugolix (Orgovyx) tablets 120 milliGRAM(s) 120 milliGRAM(s) Oral daily  tacrolimus ER Tablet (ENVARSUS XR) 7 milliGRAM(s) Oral <User Schedule>  tamsulosin 0.4 milliGRAM(s) Oral at bedtime  trimethoprim   80 mG/sulfamethoxazole 400 mG 1 Tablet(s) Oral daily    MEDICATIONS  (PRN):      CAPILLARY BLOOD GLUCOSE      POCT Blood Glucose.: 161 mg/dL (07 Jun 2021 12:52)  POCT Blood Glucose.: 139 mg/dL (07 Jun 2021 08:59)  POCT Blood Glucose.: 189 mg/dL (06 Jun 2021 21:25)  POCT Blood Glucose.: 134 mg/dL (06 Jun 2021 17:14)    I&O's Summary    06 Jun 2021 07:01  -  07 Jun 2021 07:00  --------------------------------------------------------  IN: 820 mL / OUT: 1750 mL / NET: -930 mL        PHYSICAL EXAM:  Vital Signs Last 24 Hrs  T(C): 36.6 (07 Jun 2021 13:07), Max: 36.8 (06 Jun 2021 20:19)  T(F): 97.9 (07 Jun 2021 13:07), Max: 98.3 (06 Jun 2021 20:19)  HR: 62 (07 Jun 2021 13:07) (62 - 78)  BP: 123/62 (07 Jun 2021 13:07) (123/62 - 128/69)  BP(mean): --  RR: 18 (07 Jun 2021 13:07) (18 - 18)  SpO2: 98% (07 Jun 2021 13:07) (96% - 98%)    PHYSICAL EXAM:  GENERAL: NAD, well-developed  HEAD:  Atraumatic, Normocephalic  EYES:  conjunctiva and sclera clear  NECK: Supple, No JVD  CHEST/LUNG: Clear to auscultation bilaterally; No wheeze  HEART: Regular rate and rhythm; No murmurs, rubs, or gallops  ABDOMEN: Soft, Nontender, Nondistended; Bowel sounds present  EXTREMITIES:  2+ Peripheral Pulses, No clubbing, cyanosis, or edema  PSYCH: AAOx3  NEUROLOGY: non-focal  SKIN: No rashes or lesions      LABS:                        10.9   5.01  )-----------( 160      ( 07 Jun 2021 10:57 )             35.9     06-07    137  |  105  |  36<H>  ----------------------------<  209<H>  4.9   |  19<L>  |  3.22<H>    Ca    10.2      07 Jun 2021 10:57                  RADIOLOGY & ADDITIONAL TESTS:    Imaging Personally Reviewed:    Electrocardiogram Personally Reviewed:    COORDINATION OF CARE:  Care Discussed with Consultants/Other Providers [Y/N]:  Prior or Outpatient Records Reviewed [Y/N]:     Patient is a 70y old  Male who presents with a chief complaint of Sent in for Hyperkalemia and worsening renal function (07 Jun 2021 10:30)      SUBJECTIVE / OVERNIGHT EVENTS:   NO fever    Ibarra is in place       ADDITIONAL REVIEW OF SYSTEMS: Negative except for above    MEDICATIONS  (STANDING):  allopurinol 100 milliGRAM(s) Oral daily  citric acid/sodium citrate Solution 30 milliLiter(s) Oral two times a day  dextrose 50% Injectable 25 Gram(s) IV Push once  dextrose 50% Injectable 25 Gram(s) IV Push once  heparin   Injectable 5000 Unit(s) SubCutaneous every 8 hours  insulin lispro (ADMELOG) corrective regimen sliding scale   SubCutaneous three times a day before meals  insulin lispro (ADMELOG) corrective regimen sliding scale   SubCutaneous at bedtime  magnesium oxide 400 milliGRAM(s) Oral daily  metoprolol succinate  milliGRAM(s) Oral daily  mycophenolate mofetil 250 milliGRAM(s) Oral two times a day  NIFEdipine XL 60 milliGRAM(s) Oral daily  piperacillin/tazobactam IVPB.. 3.375 Gram(s) IV Intermittent every 12 hours  predniSONE   Tablet 5 milliGRAM(s) Oral daily  Relugolix (Orgovyx) tablets 120 milliGRAM(s) 120 milliGRAM(s) Oral daily  tacrolimus ER Tablet (ENVARSUS XR) 7 milliGRAM(s) Oral <User Schedule>  tamsulosin 0.4 milliGRAM(s) Oral at bedtime  trimethoprim   80 mG/sulfamethoxazole 400 mG 1 Tablet(s) Oral daily    MEDICATIONS  (PRN):      CAPILLARY BLOOD GLUCOSE      POCT Blood Glucose.: 161 mg/dL (07 Jun 2021 12:52)  POCT Blood Glucose.: 139 mg/dL (07 Jun 2021 08:59)  POCT Blood Glucose.: 189 mg/dL (06 Jun 2021 21:25)  POCT Blood Glucose.: 134 mg/dL (06 Jun 2021 17:14)    I&O's Summary    06 Jun 2021 07:01  -  07 Jun 2021 07:00  --------------------------------------------------------  IN: 820 mL / OUT: 1750 mL / NET: -930 mL        PHYSICAL EXAM:  Vital Signs Last 24 Hrs  T(C): 36.6 (07 Jun 2021 13:07), Max: 36.8 (06 Jun 2021 20:19)  T(F): 97.9 (07 Jun 2021 13:07), Max: 98.3 (06 Jun 2021 20:19)  HR: 62 (07 Jun 2021 13:07) (62 - 78)  BP: 123/62 (07 Jun 2021 13:07) (123/62 - 128/69)  BP(mean): --  RR: 18 (07 Jun 2021 13:07) (18 - 18)  SpO2: 98% (07 Jun 2021 13:07) (96% - 98%)    PHYSICAL EXAM:  GENERAL: NAD, well-developed  HEAD:  Atraumatic, Normocephalic  EYES:  conjunctiva and sclera clear  NECK: Supple, No JVD  CHEST/LUNG: Clear to auscultation bilaterally; No wheeze  HEART: Regular rate and rhythm; No murmurs, rubs, or gallops  ABDOMEN: Soft, Nontender, Nondistended; Bowel sounds present  EXTREMITIES:  2+ Peripheral Pulses, No clubbing, cyanosis, or edema  NEUROLOGY: non-focal, AAOx3  IBARRA in place       LABS:                        10.9   5.01  )-----------( 160      ( 07 Jun 2021 10:57 )             35.9     06-07    137  |  105  |  36<H>  ----------------------------<  209<H>  4.9   |  19<L>  |  3.22<H>    Ca    10.2      07 Jun 2021 10:57                  RADIOLOGY & ADDITIONAL TESTS:    Imaging Personally Reviewed:    Electrocardiogram Personally Reviewed:    COORDINATION OF CARE:  Care Discussed with Consultants/Other Providers [Y/N]:  Prior or Outpatient Records Reviewed [Y/N]:

## 2021-06-07 NOTE — PROGRESS NOTE ADULT - SUBJECTIVE AND OBJECTIVE BOX
Follow Up:  PsA UTI and CHELA    Interval History/ROS:Patient is a 70y old  Male who presents with a chief complaint of Sent in for Hyperkalemia and worsening renal function (07 Jun 2021 09:08)      REVIEW OF SYSTEMS  [  ] ROS unobtainable because:    [  ] All other systems negative except as noted below    Constitutional:  [ ] fever [ ] chills  [ ] weight loss  [ ]night sweat  [ ]poor appetite/PO intake [ ]fatigue   Skin:  [ ] rash [ ] phlebitis	  Eyes: [ ] icterus [ ] pain  [ ] discharge	  ENMT: [ ] sore throat  [ ] thrush [ ] ulcers [ ] exudates [ ]anosmia  Respiratory: [ ] dyspnea [ ] hemoptysis [ ] cough [ ] sputum	  Cardiovascular:  [ ] chest pain [ ] palpitations [ ] edema	  Gastrointestinal:  [ ] nausea [ ] vomiting [ ] diarrhea [ ] constipation [ ] pain	  Genitourinary:  [ ] dysuria [ ] frequency [ ] hematuria [ ] discharge [ ] flank pain  [ ] incontinence  Musculoskeletal:  [ ] myalgias [ ] arthralgias [ ] arthritis  [ ] back pain  Neurological:  [ ] headache [ ] weakness [ ] seizures  [ ] confusion/altered mental status    Allergies  No Known Allergies        ANTIMICROBIALS:    piperacillin/tazobactam IVPB.. 3.375 every 8 hours  trimethoprim   80 mG/sulfamethoxazole 400 mG 1 daily      OTHER MEDS: MEDICATIONS  (STANDING):  allopurinol 100 daily  dextrose 50% Injectable 25 once  dextrose 50% Injectable 25 once  heparin   Injectable 5000 every 8 hours  insulin lispro (ADMELOG) corrective regimen sliding scale  three times a day before meals  insulin lispro (ADMELOG) corrective regimen sliding scale  at bedtime  metoprolol succinate  daily  mycophenolate mofetil 500 two times a day  NIFEdipine XL 60 daily  predniSONE   Tablet 5 daily  tacrolimus ER Tablet (ENVARSUS XR) 7 <User Schedule>  tamsulosin 0.4 at bedtime      Vital Signs Last 24 Hrs  T(F): 97.3 (06-07-21 @ 04:48), Max: 98.3 (06-03-21 @ 20:36)    Vital Signs Last 24 Hrs  HR: 78 (06-07-21 @ 04:48) (77 - 81)  BP: 124/69 (06-07-21 @ 04:48) (123/64 - 128/69)  RR: 18 (06-07-21 @ 04:48)  SpO2: 98% (06-07-21 @ 04:48) (96% - 99%)  Wt(kg): --    EXAM:  GA: NAD  HEENT: oral cavity no lesion  CV: nl S1/S2, no RMG  Lungs: CTAB  Abd: BS+, soft, nontender, no rebounding pain  Ext: no edema  Skin:  IV: no phlebitis    Labs:                        10.6   5.16  )-----------( 122      ( 05 Jun 2021 10:39 )             34.9     06-06    137  |  106  |  37<H>  ----------------------------<  211<H>  5.3   |  17<L>  |  3.32<H>    Ca    10.3      06 Jun 2021 11:00        WBC Trend:  WBC Count: 5.16 (06-05-21 @ 10:39)  WBC Count: 4.88 (06-04-21 @ 06:36)  WBC Count: 7.89 (06-02-21 @ 14:22)      Creatine Trend:  Creatinine, Serum: 3.32 (06-06)  Creatinine, Serum: 3.58 (06-05)  Creatinine, Serum: 3.81 (06-04)  Creatinine, Serum: 4.52 (06-03)      Liver Biochemical Testing Trend:  Alanine Aminotransferase (ALT/SGPT): 14 (06-02)  Alanine Aminotransferase (ALT/SGPT): 12 (06-01)  Alanine Aminotransferase (ALT/SGPT): 30 (07-10)  Alanine Aminotransferase (ALT/SGPT): 32 (07-09)  Alanine Aminotransferase (ALT/SGPT): 34 (07-08)  Aspartate Aminotransferase (AST/SGOT): 22 (06-02-21 @ 14:23)  Aspartate Aminotransferase (AST/SGOT): 18 (06-01-21 @ 22:01)  Aspartate Aminotransferase (AST/SGOT): 29 (07-10-20 @ 05:56)  Aspartate Aminotransferase (AST/SGOT): 29 (07-09-20 @ 06:07)  Aspartate Aminotransferase (AST/SGOT): 38 (07-08-20 @ 06:07)  Bilirubin Total, Serum: 0.2 (06-02)  Bilirubin Total, Serum: 0.2 (06-01)  Bilirubin Total, Serum: 0.2 (07-10)  Bilirubin Total, Serum: 0.2 (07-09)  Bilirubin Total, Serum: 0.2 (07-08)      Trend LDH  07-19-18 @ 02:37  176          MICROBIOLOGY:      Culture - Blood (collected 02 Jun 2021 05:18)  Source: .Blood Blood-Venous  Final Report:    No Growth Final    Culture - Blood (collected 02 Jun 2021 05:18)  Source: .Blood Blood-Peripheral  Final Report:    No Growth Final    Culture - Urine (collected 02 Jun 2021 03:48)  Source: .Urine Clean Catch (Midstream)  Final Report:    >100,000 CFU/ml Pseudomonas aeruginosa  Organism: Pseudomonas aeruginosa  Organism: Pseudomonas aeruginosa    Sensitivities:      -  Amikacin: S <=16      -  Aztreonam: I 16      -  Cefepime: S 8      -  Ceftazidime: S 4      -  Ciprofloxacin: R >2      -  Gentamicin: S <=2      -  Imipenem: I 4      -  Levofloxacin: R >4      -  Meropenem: I 4      -  Piperacillin/Tazobactam: S <=8      -  Tobramycin: S <=2      Method Type: AUTUMN    Culture - Blood (collected 09 Jul 2020 09:01)  Source: .Blood Blood-Peripheral  Final Report:    No Growth Final    Culture - Blood (collected 08 Jul 2020 14:50)  Source: .Blood Blood-Peripheral  Final Report:    No Growth Final    Culture - Urine (collected 05 Jul 2020 19:39)  Source: .Urine Clean Catch (Midstream)  Final Report:    10,000 - 49,000 CFU/mL Pseudomonas aeruginosa (Carbapenem Resistant)  Organism: Pseudomonas aeruginosa (Carbapenem Resistant)  Organism: Pseudomonas aeruginosa (Carbapenem Resistant)    Sensitivities:      -  Amikacin: S <=16      -  Aztreonam: R >16      -  Cefepime: S 8      -  Ceftazidime: S 4      -  Ciprofloxacin: S <=1      -  Gentamicin: S <=4      -  Imipenem: R 8      -  Levofloxacin: S <=2      -  Meropenem: R >8      -  Piperacillin/Tazobactam: S <=16      -  Tobramycin: S <=4      Method Type: AUTUMN    Culture - Blood (collected 05 Jul 2020 16:38)  Source: .Blood Blood-Peripheral  Final Report:    Growth in aerobic bottle: Pseudomonas aeruginosa    ***Blood Panel PCR results on this specimen are available    approximately 3 hours after the Gram stain result.***    Gram stain, PCR, and/or culture results may not always    correspond due to differencein methodologies.    ************************************************************    This PCR assay was performed using [employer].    The following targets are tested for: Enterococcus,    vancomycin resistant enterococci, Listeria monocytogenes,    coagulase negative staphylococci, S. aureus,    methicillin resistant S. aureus, Streptococcus agalactiae    (Group B), S. pneumoniae, S. pyogenes (Group A),    Acinetobacter baumannii, Enterobacter cloacae, E. coli,    Klebsiella oxytoca, K. pneumoniae, Proteus sp.,    Serratia marcescens, Haemophilus influenzae,    Neisseria meningitidis, Pseudomonas aeruginosa, Candida    albicans, C. glabrata, C krusei, C parapsilosis,    C. tropicalis and the KPC resistance gene.    "Due to technical problems, Proteus sp. will Not be reported as part of    the BCID panel until further notice"  Organism: Blood Culture PCR  Pseudomonas aeruginosa  Organism: Pseudomonas aeruginosa    Sensitivities:      -  Amikacin: S <=16      -  Aztreonam: R >16      -  Cefepime: S 8      -  Ceftazidime: S 8      -  Ciprofloxacin: I 0.5      -  Gentamicin: S <=2      -  Levofloxacin: R 2      -  Meropenem: S <=1      -  Piperacillin/Tazobactam: S 16      -  Tobramycin: S <=2      Method Type: AUTUMN  Organism: Blood Culture PCR    Sensitivities:      -  Pseudomonas aeruginosa: Detec      Method Type: PCR    Culture - Blood (collected 05 Jul 2020 16:38)  Source: .Blood Blood-Peripheral  Final Report:    Growth in aerobic bottle: Pseudomonas aeruginosa    See previous culture [id number]    Culture - Blood (collected 26 Feb 2020 09:04)  Source: .Blood Blood  Final Report:    No growth at 5 days.    Culture - Blood (collected 26 Feb 2020 09:04)  Source: .Blood Blood  Final Report:    No growth at 5 days.          HIV-1 RNA Quantitative, Viral Load:   NOT DET. (08-15-18 @ 14:37)  HIV-1 Viral Load Result: NOT DET. (08-15-18 @ 14:37)    Cytomegalovirus By PCR: 329 (06-03-21 @ 14:55)      COVID-19 PCR: NotDetec (06-01-21 @ 23:22)  COVID-19 PCR: NotDetec (04-20-21 @ 16:51)    COVID-19 [facility] AB Interp: Positive (06-03-21 @ 09:02)  Ferritin, Serum: 450 (06-02)      RADIOLOGY:  imaging below personally reviewed   Follow Up:  PsA UTI and CHELA    Interval History/ROS:Patient is a 70y old  Male who presents with a chief complaint of Sent in for Hyperkalemia and worsening renal function (07 Jun 2021 09:08)  - No complain, want to go home  - Still has Velez    REVIEW OF SYSTEMS  [  ] ROS unobtainable because:    [ V ] All other systems negative except as noted below    Constitutional:  [ ] fever [ ] chills  [ ] weight loss  [ ]night sweat  [ ]poor appetite/PO intake [ ]fatigue   Skin:  [ ] rash [ ] phlebitis	  Eyes: [ ] icterus [ ] pain  [ ] discharge	  ENMT: [ ] sore throat  [ ] thrush [ ] ulcers [ ] exudates [ ]anosmia  Respiratory: [ ] dyspnea [ ] hemoptysis [ ] cough [ ] sputum	  Cardiovascular:  [ ] chest pain [ ] palpitations [ ] edema	  Gastrointestinal:  [ ] nausea [ ] vomiting [ ] diarrhea [ ] constipation [ ] pain	  Genitourinary:  [ ] dysuria [ ] frequency [ ] hematuria [ ] discharge [ ] flank pain  [ ] incontinence  Musculoskeletal:  [ ] myalgias [ ] arthralgias [ ] arthritis  [ ] back pain  Neurological:  [ ] headache [ ] weakness [ ] seizures  [ ] confusion/altered mental status    Allergies  No Known Allergies    ANTIMICROBIALS:    piperacillin/tazobactam IVPB.. 3.375 every 8 hours  trimethoprim   80 mG/sulfamethoxazole 400 mG 1 daily    OTHER MEDS: MEDICATIONS  (STANDING):  allopurinol 100 daily  dextrose 50% Injectable 25 once  dextrose 50% Injectable 25 once  heparin   Injectable 5000 every 8 hours  insulin lispro (ADMELOG) corrective regimen sliding scale  three times a day before meals  insulin lispro (ADMELOG) corrective regimen sliding scale  at bedtime  metoprolol succinate  daily  mycophenolate mofetil 500 two times a day  NIFEdipine XL 60 daily  predniSONE   Tablet 5 daily  tacrolimus ER Tablet (ENVARSUS XR) 7 <User Schedule>  tamsulosin 0.4 at bedtime      Vital Signs Last 24 Hrs  T(F): 97.3 (06-07-21 @ 04:48), Max: 98.3 (06-03-21 @ 20:36)    Vital Signs Last 24 Hrs  HR: 78 (06-07-21 @ 04:48) (77 - 81)  BP: 124/69 (06-07-21 @ 04:48) (123/64 - 128/69)  RR: 18 (06-07-21 @ 04:48)  SpO2: 98% (06-07-21 @ 04:48) (96% - 99%)  Wt(kg): --    EXAM:  GA: NAD  HEENT: oral cavity no lesion  CV: nl S1/S2, no RMG  Lungs: CTAB  Abd: BS+, soft, nontender, no rebounding pain  Ext: no edema  Skin: no rash  IV: no phlebitis  Velez+    Labs:                        10.6   5.16  )-----------( 122      ( 05 Jun 2021 10:39 )             34.9     06-06    137  |  106  |  37<H>  ----------------------------<  211<H>  5.3   |  17<L>  |  3.32<H>    Ca    10.3      06 Jun 2021 11:00        WBC Trend:  WBC Count: 5.16 (06-05-21 @ 10:39)  WBC Count: 4.88 (06-04-21 @ 06:36)  WBC Count: 7.89 (06-02-21 @ 14:22)      Creatine Trend:  Creatinine, Serum: 3.32 (06-06)  Creatinine, Serum: 3.58 (06-05)  Creatinine, Serum: 3.81 (06-04)  Creatinine, Serum: 4.52 (06-03)      Liver Biochemical Testing Trend:  Alanine Aminotransferase (ALT/SGPT): 14 (06-02)  Alanine Aminotransferase (ALT/SGPT): 12 (06-01)  Alanine Aminotransferase (ALT/SGPT): 30 (07-10)  Alanine Aminotransferase (ALT/SGPT): 32 (07-09)  Alanine Aminotransferase (ALT/SGPT): 34 (07-08)  Aspartate Aminotransferase (AST/SGOT): 22 (06-02-21 @ 14:23)  Aspartate Aminotransferase (AST/SGOT): 18 (06-01-21 @ 22:01)  Aspartate Aminotransferase (AST/SGOT): 29 (07-10-20 @ 05:56)  Aspartate Aminotransferase (AST/SGOT): 29 (07-09-20 @ 06:07)  Aspartate Aminotransferase (AST/SGOT): 38 (07-08-20 @ 06:07)  Bilirubin Total, Serum: 0.2 (06-02)  Bilirubin Total, Serum: 0.2 (06-01)  Bilirubin Total, Serum: 0.2 (07-10)  Bilirubin Total, Serum: 0.2 (07-09)  Bilirubin Total, Serum: 0.2 (07-08)      Trend LDH  07-19-18 @ 02:37  176          MICROBIOLOGY:      Culture - Blood (collected 02 Jun 2021 05:18)  Source: .Blood Blood-Venous  Final Report:    No Growth Final    Culture - Blood (collected 02 Jun 2021 05:18)  Source: .Blood Blood-Peripheral  Final Report:    No Growth Final    Culture - Urine (collected 02 Jun 2021 03:48)  Source: .Urine Clean Catch (Midstream)  Final Report:    >100,000 CFU/ml Pseudomonas aeruginosa  Organism: Pseudomonas aeruginosa  Organism: Pseudomonas aeruginosa    Sensitivities:      -  Amikacin: S <=16      -  Aztreonam: I 16      -  Cefepime: S 8      -  Ceftazidime: S 4      -  Ciprofloxacin: R >2      -  Gentamicin: S <=2      -  Imipenem: I 4      -  Levofloxacin: R >4      -  Meropenem: I 4      -  Piperacillin/Tazobactam: S <=8      -  Tobramycin: S <=2      Method Type: AUTUMN    Culture - Blood (collected 09 Jul 2020 09:01)  Source: .Blood Blood-Peripheral  Final Report:    No Growth Final    Culture - Blood (collected 08 Jul 2020 14:50)  Source: .Blood Blood-Peripheral  Final Report:    No Growth Final    Culture - Urine (collected 05 Jul 2020 19:39)  Source: .Urine Clean Catch (Midstream)  Final Report:    10,000 - 49,000 CFU/mL Pseudomonas aeruginosa (Carbapenem Resistant)  Organism: Pseudomonas aeruginosa (Carbapenem Resistant)  Organism: Pseudomonas aeruginosa (Carbapenem Resistant)    Sensitivities:      -  Amikacin: S <=16      -  Aztreonam: R >16      -  Cefepime: S 8      -  Ceftazidime: S 4      -  Ciprofloxacin: S <=1      -  Gentamicin: S <=4      -  Imipenem: R 8      -  Levofloxacin: S <=2      -  Meropenem: R >8      -  Piperacillin/Tazobactam: S <=16      -  Tobramycin: S <=4      Method Type: AUTUMN    Culture - Blood (collected 05 Jul 2020 16:38)  Source: .Blood Blood-Peripheral  Final Report:    Growth in aerobic bottle: Pseudomonas aeruginosa    ***Blood Panel PCR results on this specimen are available    approximately 3 hours after the Gram stain result.***    Gram stain, PCR, and/or culture results may not always    correspond due to differencein methodologies.    ************************************************************    This PCR assay was performed using [employer].    The following targets are tested for: Enterococcus,    vancomycin resistant enterococci, Listeria monocytogenes,    coagulase negative staphylococci, S. aureus,    methicillin resistant S. aureus, Streptococcus agalactiae    (Group B), S. pneumoniae, S. pyogenes (Group A),    Acinetobacter baumannii, Enterobacter cloacae, E. coli,    Klebsiella oxytoca, K. pneumoniae, Proteus sp.,    Serratia marcescens, Haemophilus influenzae,    Neisseria meningitidis, Pseudomonas aeruginosa, Candida    albicans, C. glabrata, C krusei, C parapsilosis,    C. tropicalis and the KPC resistance gene.    "Due to technical problems, Proteus sp. will Not be reported as part of    the BCID panel until further notice"  Organism: Blood Culture PCR  Pseudomonas aeruginosa  Organism: Pseudomonas aeruginosa    Sensitivities:      -  Amikacin: S <=16      -  Aztreonam: R >16      -  Cefepime: S 8      -  Ceftazidime: S 8      -  Ciprofloxacin: I 0.5      -  Gentamicin: S <=2      -  Levofloxacin: R 2      -  Meropenem: S <=1      -  Piperacillin/Tazobactam: S 16      -  Tobramycin: S <=2      Method Type: AUTUMN  Organism: Blood Culture PCR    Sensitivities:      -  Pseudomonas aeruginosa: Detec      Method Type: PCR    Culture - Blood (collected 05 Jul 2020 16:38)  Source: .Blood Blood-Peripheral  Final Report:    Growth in aerobic bottle: Pseudomonas aeruginosa    See previous culture [id number]    Culture - Blood (collected 26 Feb 2020 09:04)  Source: .Blood Blood  Final Report:    No growth at 5 days.    Culture - Blood (collected 26 Feb 2020 09:04)  Source: .Blood Blood  Final Report:    No growth at 5 days.          HIV-1 RNA Quantitative, Viral Load:   NOT DET. (08-15-18 @ 14:37)  HIV-1 Viral Load Result: NOT DET. (08-15-18 @ 14:37)    Cytomegalovirus By PCR: 329 (06-03-21 @ 14:55)      COVID-19 PCR: NotDetec (06-01-21 @ 23:22)  COVID-19 PCR: NotDetec (04-20-21 @ 16:51)    COVID-19 [facility] AB Interp: Positive (06-03-21 @ 09:02)  Ferritin, Serum: 450 (06-02)      RADIOLOGY:  imaging below personally reviewed   Follow Up:  PsA UTI and CHELA    Interval History/ROS:Patient is a 70y old  Male who presents with a chief complaint of Sent in for Hyperkalemia and worsening renal function (07 Jun 2021 09:08)  - No complain, want to go home  - Still has Velez    REVIEW OF SYSTEMS  [  ] ROS unobtainable because:    [ V ] All other systems negative except as noted below    Constitutional:  [ ] fever [ ] chills  [ ] weight loss  [ ]night sweat  [ ]poor appetite/PO intake [ ]fatigue   Skin:  [ ] rash [ ] phlebitis	  Eyes: [ ] icterus [ ] pain  [ ] discharge	  ENMT: [ ] sore throat  [ ] thrush [ ] ulcers [ ] exudates [ ]anosmia  Respiratory: [ ] dyspnea [ ] hemoptysis [ ] cough [ ] sputum	  Cardiovascular:  [ ] chest pain [ ] palpitations [ ] edema	  Gastrointestinal:  [ ] nausea [ ] vomiting [ ] diarrhea [ ] constipation [ ] pain	  Genitourinary:  [ ] dysuria [ ] frequency [ ] hematuria [ ] discharge [ ] flank pain  [ ] incontinence  Musculoskeletal:  [ ] myalgias [ ] arthralgias [ ] arthritis  [ ] back pain  Neurological:  [ ] headache [ ] weakness [ ] seizures  [ ] confusion/altered mental status    Allergies  No Known Allergies    ANTIMICROBIALS:    piperacillin/tazobactam IVPB.. 3.375 every 8 hours  trimethoprim   80 mG/sulfamethoxazole 400 mG 1 daily    OTHER MEDS: MEDICATIONS  (STANDING):  allopurinol 100 daily  dextrose 50% Injectable 25 once  dextrose 50% Injectable 25 once  heparin   Injectable 5000 every 8 hours  insulin lispro (ADMELOG) corrective regimen sliding scale  three times a day before meals  insulin lispro (ADMELOG) corrective regimen sliding scale  at bedtime  metoprolol succinate  daily  mycophenolate mofetil 500 two times a day  NIFEdipine XL 60 daily  predniSONE   Tablet 5 daily  tacrolimus ER Tablet (ENVARSUS XR) 7 <User Schedule>  tamsulosin 0.4 at bedtime      Vital Signs Last 24 Hrs  T(F): 97.3 (06-07-21 @ 04:48), Max: 98.3 (06-03-21 @ 20:36)    Vital Signs Last 24 Hrs  HR: 78 (06-07-21 @ 04:48) (77 - 81)  BP: 124/69 (06-07-21 @ 04:48) (123/64 - 128/69)  RR: 18 (06-07-21 @ 04:48)  SpO2: 98% (06-07-21 @ 04:48) (96% - 99%)  Wt(kg): --    EXAM:  GA: NAD  HEENT: oral cavity no lesion  CV: nl S1/S2, no RMG  Lungs: CTAB  Abd: BS+, soft, nontender, no rebounding pain  Ext: no edema  Skin: no rash  IV: no phlebitis  Velez+    Labs:                        10.6   5.16  )-----------( 122      ( 05 Jun 2021 10:39 )             34.9     06-06    137  |  106  |  37<H>  ----------------------------<  211<H>  5.3   |  17<L>  |  3.32<H>    Ca    10.3      06 Jun 2021 11:00        WBC Trend:  WBC Count: 5.16 (06-05-21 @ 10:39)  WBC Count: 4.88 (06-04-21 @ 06:36)  WBC Count: 7.89 (06-02-21 @ 14:22)      Creatine Trend:  Creatinine, Serum: 3.32 (06-06)  Creatinine, Serum: 3.58 (06-05)  Creatinine, Serum: 3.81 (06-04)  Creatinine, Serum: 4.52 (06-03)      Liver Biochemical Testing Trend:  Alanine Aminotransferase (ALT/SGPT): 14 (06-02)  Alanine Aminotransferase (ALT/SGPT): 12 (06-01)  Alanine Aminotransferase (ALT/SGPT): 30 (07-10)  Alanine Aminotransferase (ALT/SGPT): 32 (07-09)  Alanine Aminotransferase (ALT/SGPT): 34 (07-08)  Aspartate Aminotransferase (AST/SGOT): 22 (06-02-21 @ 14:23)  Aspartate Aminotransferase (AST/SGOT): 18 (06-01-21 @ 22:01)  Aspartate Aminotransferase (AST/SGOT): 29 (07-10-20 @ 05:56)  Aspartate Aminotransferase (AST/SGOT): 29 (07-09-20 @ 06:07)  Aspartate Aminotransferase (AST/SGOT): 38 (07-08-20 @ 06:07)  Bilirubin Total, Serum: 0.2 (06-02)  Bilirubin Total, Serum: 0.2 (06-01)  Bilirubin Total, Serum: 0.2 (07-10)  Bilirubin Total, Serum: 0.2 (07-09)  Bilirubin Total, Serum: 0.2 (07-08)      Trend LDH  07-19-18 @ 02:37  176          MICROBIOLOGY:      Culture - Blood (collected 02 Jun 2021 05:18)  Source: .Blood Blood-Venous  Final Report:    No Growth Final    Culture - Blood (collected 02 Jun 2021 05:18)  Source: .Blood Blood-Peripheral  Final Report:    No Growth Final    Culture - Urine (collected 02 Jun 2021 03:48)  Source: .Urine Clean Catch (Midstream)  Final Report:    >100,000 CFU/ml Pseudomonas aeruginosa  Organism: Pseudomonas aeruginosa  Organism: Pseudomonas aeruginosa    Sensitivities:      -  Amikacin: S <=16      -  Aztreonam: I 16      -  Cefepime: S 8      -  Ceftazidime: S 4      -  Ciprofloxacin: R >2      -  Gentamicin: S <=2      -  Imipenem: I 4      -  Levofloxacin: R >4      -  Meropenem: I 4      -  Piperacillin/Tazobactam: S <=8      -  Tobramycin: S <=2      Method Type: AUTUMN    Culture - Blood (collected 09 Jul 2020 09:01)  Source: .Blood Blood-Peripheral  Final Report:    No Growth Final    Culture - Blood (collected 08 Jul 2020 14:50)  Source: .Blood Blood-Peripheral  Final Report:    No Growth Final    Culture - Urine (collected 05 Jul 2020 19:39)  Source: .Urine Clean Catch (Midstream)  Final Report:    10,000 - 49,000 CFU/mL Pseudomonas aeruginosa (Carbapenem Resistant)  Organism: Pseudomonas aeruginosa (Carbapenem Resistant)  Organism: Pseudomonas aeruginosa (Carbapenem Resistant)    Sensitivities:      -  Amikacin: S <=16      -  Aztreonam: R >16      -  Cefepime: S 8      -  Ceftazidime: S 4      -  Ciprofloxacin: S <=1      -  Gentamicin: S <=4      -  Imipenem: R 8      -  Levofloxacin: S <=2      -  Meropenem: R >8      -  Piperacillin/Tazobactam: S <=16      -  Tobramycin: S <=4      Method Type: AUTUMN    Culture - Blood (collected 05 Jul 2020 16:38)  Source: .Blood Blood-Peripheral  Final Report:    Growth in aerobic bottle: Pseudomonas aeruginosa    ***Blood Panel PCR results on this specimen are available    approximately 3 hours after the Gram stain result.***    Gram stain, PCR, and/or culture results may not always    correspond due to differencein methodologies.    ************************************************************    This PCR assay was performed using [facility] [facility].    The following targets are tested for: Enterococcus,    vancomycin resistant enterococci, Listeria monocytogenes,    coagulase negative staphylococci, S. aureus,    methicillin resistant S. aureus, Streptococcus agalactiae    (Group B), S. pneumoniae, S. pyogenes (Group A),    Acinetobacter baumannii, Enterobacter cloacae, E. coli,    Klebsiella oxytoca, K. pneumoniae, Proteus sp.,    Serratia marcescens, Haemophilus influenzae,    Neisseria meningitidis, Pseudomonas aeruginosa, Candida    albicans, C. glabrata, C krusei, C parapsilosis,    C. tropicalis and the KPC resistance gene.    "Due to technical problems, Proteus sp. will Not be reported as part of    the BCID panel until further notice"  Organism: Blood Culture PCR  Pseudomonas aeruginosa  Organism: Pseudomonas aeruginosa    Sensitivities:      -  Amikacin: S <=16      -  Aztreonam: R >16      -  Cefepime: S 8      -  Ceftazidime: S 8      -  Ciprofloxacin: I 0.5      -  Gentamicin: S <=2      -  Levofloxacin: R 2      -  Meropenem: S <=1      -  Piperacillin/Tazobactam: S 16      -  Tobramycin: S <=2      Method Type: AUTUMN  Organism: Blood Culture PCR    Sensitivities:      -  Pseudomonas aeruginosa: Detec      Method Type: PCR    Culture - Blood (collected 05 Jul 2020 16:38)  Source: .Blood Blood-Peripheral  Final Report:    Growth in aerobic bottle: Pseudomonas aeruginosa    See previous culture [id number]    Culture - Blood (collected 26 Feb 2020 09:04)  Source: .Blood Blood  Final Report:    No growth at 5 days.    Culture - Blood (collected 26 Feb 2020 09:04)  Source: .Blood Blood  Final Report:    No growth at 5 days.          HIV-1 RNA Quantitative, Viral Load:   NOT DET. (08-15-18 @ 14:37)  HIV-1 Viral Load Result: NOT DET. (08-15-18 @ 14:37)    Cytomegalovirus By PCR: 329 (06-03-21 @ 14:55)      COVID-19 PCR: NotDetec (06-01-21 @ 23:22)  COVID-19 PCR: NotDetec (04-20-21 @ 16:51)    COVID-19 [facility] Domain AB Interp: Positive (06-03-21 @ 09:02)  Ferritin, Serum: 450 (06-02)      RADIOLOGY:    <The imaging below has been reviewed and visualized by me independently. Findings as detailed in report below>    EXAM:  US KIDNEY TRANSPLANT W DOPP RT                        PROCEDURE DATE:  06/02/2021    Elevated postvoid residual with mild to moderate hydronephrosis. Circumferential bladder wall and urothelial thickening with mild bladder wall trabeculations may be secondary to some degree of chronic bladder outlet obstruction. However, correlate with urinalysis.  No evidence of a significant renal artery stenosis.

## 2021-06-07 NOTE — PROGRESS NOTE ADULT - SUBJECTIVE AND OBJECTIVE BOX
[facility] DIVISION OF KIDNEY DISEASES AND HYPERTENSION -- FOLLOW UP NOTE  --------------------------------------------------------------------------------  Luis F Grullon   Nephrology Fellow  Pager NS: [phone]/ [facility]: [phone]  (After 5 pm or on weekends please page the on-call fellow, can view the schedule on [employer]. Login is marimar zamudio, schedule under [facility] medicine, psych, derm.    24 hour events/subjective: Patient sleepy. Denies CP/SOB, fever, chills. Has chronic diarrhea which is better.       PAST HISTORY  --------------------------------------------------------------------------------  No significant changes to PMH, PSH, FHx, SHx, unless otherwise noted    ALLERGIES & MEDICATIONS  --------------------------------------------------------------------------------  Allergies    No Known Allergies    Intolerances      Standing Inpatient Medications  allopurinol 100 milliGRAM(s) Oral daily  citric acid/sodium citrate Solution 30 milliLiter(s) Oral two times a day  dextrose 50% Injectable 25 Gram(s) IV Push once  dextrose 50% Injectable 25 Gram(s) IV Push once  heparin   Injectable 5000 Unit(s) SubCutaneous every 8 hours  insulin lispro (ADMELOG) corrective regimen sliding scale   SubCutaneous three times a day before meals  insulin lispro (ADMELOG) corrective regimen sliding scale   SubCutaneous at bedtime  magnesium oxide 400 milliGRAM(s) Oral daily  metoprolol succinate  milliGRAM(s) Oral daily  mycophenolate mofetil 500 milliGRAM(s) Oral two times a day  NIFEdipine XL 60 milliGRAM(s) Oral daily  piperacillin/tazobactam IVPB.. 3.375 Gram(s) IV Intermittent every 12 hours  predniSONE   Tablet 5 milliGRAM(s) Oral daily  Relugolix (Orgovyx) tablets 120 milliGRAM(s) 120 milliGRAM(s) Oral daily  tacrolimus ER Tablet (ENVARSUS XR) 7 milliGRAM(s) Oral <User Schedule>  tamsulosin 0.4 milliGRAM(s) Oral at bedtime  trimethoprim   80 mG/sulfamethoxazole 400 mG 1 Tablet(s) Oral daily    PRN Inpatient Medications      REVIEW OF SYSTEMS  --------------------------------------------------------------------------------  Gen: No fevers/chills  Head/Eyes/Ears/Mouth: No headache; Normal hearing; Normal vision    Respiratory: No dyspnea, cough  CV: No chest pain  GI: No abdominal pain,+ chronic diarrhea  : No increased frequency, dysuria  MSK: No joint pain/swelling; no edema    All other systems were reviewed and are negative, except as noted.    VITALS/PHYSICAL EXAM  --------------------------------------------------------------------------------  T(C): 36.3 (06-07-21 @ 04:48), Max: 36.8 (06-06-21 @ 20:19)  HR: 78 (06-07-21 @ 04:48) (77 - 81)  BP: 124/69 (06-07-21 @ 04:48) (123/64 - 128/69)  RR: 18 (06-07-21 @ 04:48) (18 - 19)  SpO2: 98% (06-07-21 @ 04:48) (96% - 99%)  Wt(kg): --        06-06-21 @ 07:01  -  06-07-21 @ 07:00  --------------------------------------------------------  IN: 820 mL / OUT: 1750 mL / NET: -930 mL      Physical Exam:  	Gen: NAD  	HEENT: Anicteric   	Pulm: CTA B/L  	CV: RRR, S1S2  	Abd: +BS, soft, nontender/nondistended                      Transplant: No tenderness, swelling  	: + armenta with clear urine  	LE: Warm, no edema  	Psych: Normal affect and mood  	Skin: Warm, without rashes      LABS/STUDIES  --------------------------------------------------------------------------------              10.6   5.16  >-----------<  122      [06-05-21 @ 10:39]              34.9     137  |  106  |  37  ----------------------------<  211      [06-06-21 @ 11:00]  5.3   |  17  |  3.32        Ca     10.3     [06-06-21 @ 11:00]            Creatinine Trend:  SCr 3.32 [06-06 @ 11:00]  SCr 3.58 [06-05 @ 10:39]  SCr 3.81 [06-04 @ 06:36]  SCr 4.52 [06-03 @ 06:46]  SCr 4.88 [06-02 @ 14:23]    Tacrolimus (), Serum: 5.8 ng/mL (06-05 @ 15:12)  Tacrolimus (), Serum: 6.9 ng/mL (06-04 @ 10:11)  Tacrolimus (), Serum: 6.8 ng/mL (06-03 @ 10:16)            Urinalysis - [06-01-21 @ 23:37]      Color Yellow / Appearance Turbid / SG 1.018 / pH 6.0      Gluc Negative / Ketone Negative  / Bili Negative / Urobili Negative       Blood Large / Protein 100 mg/dL / Leuk Est Large / Nitrite Positive      RBC 30 /  / Hyaline 4 / Gran  / Sq Epi  / Non Sq Epi 1 / Bacteria Negative    Urine Creatinine 169      [06-02-21 @ 06:38]  Urine Protein 95      [06-02-21 @ 06:38]  Urine Sodium 65      [06-02-21 @ 06:38]  Urine Urea Nitrogen 560      [06-02-21 @ 08:31]    Iron 75, TIBC 278, %sat 27      [06-02-21 @ 16:51]  Ferritin 450      [06-02-21 @ 17:06]  PTH -- (Ca 10.3)      [06-04-21 @ 09:37]   22  PTH -- (Ca --)      [06-02-21 @ 17:06]   15  Vitamin D (25OH) 37.0      [06-04-21 @ 09:37]  HbA1c 6.0      [08-09-19 @ 23:48]         [facility] DIVISION OF KIDNEY DISEASES AND HYPERTENSION -- FOLLOW UP NOTE  --------------------------------------------------------------------------------  Luis F Grullon   Nephrology Fellow  Pager NS: [phone]/ [facility]: [phone]  (After 5 pm or on weekends please page the on-call fellow, can view the schedule on [employer]. Login is marimar zamudio, schedule under [facility] medicine, psych, derm.    24 hour events/subjective: Patient sleepy. Denies CP/SOB, fever, chills. Has chronic diarrhea which is better.       PAST HISTORY  --------------------------------------------------------------------------------  No significant changes to PMH, PSH, FHx, SHx, unless otherwise noted    ALLERGIES & MEDICATIONS  --------------------------------------------------------------------------------  Allergies  No Known Allergies          Standing Inpatient Medications  allopurinol 100 milliGRAM(s) Oral daily  citric acid/sodium citrate Solution 30 milliLiter(s) Oral two times a day  heparin   Injectable 5000 Unit(s) SubCutaneous every 8 hours  insulin lispro (ADMELOG) corrective regimen sliding scale   SubCutaneous three times a day before meals  insulin lispro (ADMELOG) corrective regimen sliding scale   SubCutaneous at bedtime  magnesium oxide 400 milliGRAM(s) Oral daily  metoprolol succinate  milliGRAM(s) Oral daily  mycophenolate mofetil 500 milliGRAM(s) Oral two times a day  NIFEdipine XL 60 milliGRAM(s) Oral daily  piperacillin/tazobactam IVPB.. 3.375 Gram(s) IV Intermittent every 12 hours  predniSONE   Tablet 5 milliGRAM(s) Oral daily  Relugolix (Orgovyx) tablets 120 milliGRAM(s) 120 milliGRAM(s) Oral daily  tacrolimus ER Tablet (ENVARSUS XR) 7 milliGRAM(s) Oral <User Schedule>  tamsulosin 0.4 milliGRAM(s) Oral at bedtime  trimethoprim   80 mG/sulfamethoxazole 400 mG 1 Tablet(s) Oral daily      REVIEW OF SYSTEMS  --------------------------------------------------------------------------------  Gen: No fevers/chills  Head/Eyes/Ears/Mouth: No headache; Normal hearing; Normal vision    Respiratory: No dyspnea, cough  CV: No chest pain  GI: No abdominal pain,+ chronic diarrhea  : No increased frequency, dysuria  MSK: No joint pain/swelling; no edema    All other systems were reviewed and are negative, except as noted.    VITALS/PHYSICAL EXAM  --------------------------------------------------------------------------------  T(C): 36.3 (06-07-21 @ 04:48), Max: 36.8 (06-06-21 @ 20:19)  HR: 78 (06-07-21 @ 04:48) (77 - 81)  BP: 124/69 (06-07-21 @ 04:48) (123/64 - 128/69)  RR: 18 (06-07-21 @ 04:48) (18 - 19)  SpO2: 98% (06-07-21 @ 04:48) (96% - 99%)          06-06-21 @ 07:01  -  06-07-21 @ 07:00  --------------------------------------------------------  IN: 820 mL / OUT: 1750 mL / NET: -930 mL    Physical Exam:  	Gen: NAD  	HEENT: Anicteric   	Pulm: CTA B/L  	CV: RRR, S1S2  	Abd: +BS, soft, nontender/nondistended                      Transplant: No tenderness, swelling  	: + armenta with clear urine  	LE: Warm, no edema  	Psych: Normal affect and mood  	Skin: Warm, without rashes      LABS/STUDIES  --------------------------------------------------------------------------------              10.6   5.16  >-----------<  122      [06-05-21 @ 10:39]              34.9     137  |  106  |  37  ----------------------------<  211      [06-06-21 @ 11:00]  5.3   |  17  |  3.32        Ca     10.3     [06-06-21 @ 11:00]      Creatinine Trend:  SCr 3.32 [06-06 @ 11:00]  SCr 3.58 [06-05 @ 10:39]  SCr 3.81 [06-04 @ 06:36]  SCr 4.52 [06-03 @ 06:46]  SCr 4.88 [06-02 @ 14:23]    Tacrolimus (), Serum: 5.8 ng/mL (06-05 @ 15:12)  Tacrolimus (), Serum: 6.9 ng/mL (06-04 @ 10:11)  Tacrolimus (), Serum: 6.8 ng/mL (06-03 @ 10:16)      Urinalysis - [06-01-21 @ 23:37]      Color Yellow / Appearance Turbid / SG 1.018 / pH 6.0      Gluc Negative / Ketone Negative  / Bili Negative / Urobili Negative       Blood Large / Protein 100 mg/dL / Leuk Est Large / Nitrite Positive      RBC 30 /  / Hyaline 4 / Gran  / Sq Epi  / Non Sq Epi 1 / Bacteria Negative    Urine Creatinine 169      [06-02-21 @ 06:38]  Urine Protein 95      [06-02-21 @ 06:38]  Urine Sodium 65      [06-02-21 @ 06:38]  Urine Urea Nitrogen 560      [06-02-21 @ 08:31]    Iron 75, TIBC 278, %sat 27      [06-02-21 @ 16:51]  Ferritin 450      [06-02-21 @ 17:06]  PTH -- (Ca 10.3)      [06-04-21 @ 09:37]   22  PTH -- (Ca --)      [06-02-21 @ 17:06]   15  Vitamin D (25OH) 37.0      [06-04-21 @ 09:37]  HbA1c 6.0      [08-09-19 @ 23:48]

## 2021-06-07 NOTE — PROGRESS NOTE ADULT - ASSESSMENT
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 He was recently treated for two weeks with ciprofloxacin for pseudomonas urinary tract infection (known to be colonized). Patient denies dysuria or polyuria.    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  - Right iliac fossa transplant kidney with ureteral stent extending from the right renal pelvis into the bladder.  - Findings hydronephrosis and urothelial thickening of the renal transplant.  - CMV PCR viral load: 326    #CHELA and hyperkalemia:  - Trend BMP  - Slowing improving, feel that may reach to a plateau?    #Prostate cancer:  - On Orgovyx for prostate cancer. He is pending radiation therapy after finishing his oral therapy.    Clemente Oleary MD, PGY4   ID fellow  Pager: [phone]  After 5pm/weekends call [phone]    d/w Dr. Hall  Recs conveyed to primary team   70M with PMHx of right renal transplant (~2018) hx of dysfunction due to strictures and known hydronephrosis, bilateral nephrectomy (secondary to RCC), prostate cancer (on active treatment), HTN, T2DM, hx of CMV viremia, PsA bacteremia in 2020 (blood PsA not CRE but urine PsA is CRE), chronic diarrhea (multiple scopes without etiology), was sent in by doctor for CHELA and hyperkalemia on routine blood work. He was recently treated for two weeks with ciprofloxacin for pseudomonas urinary tract infection (known to be colonized). Patient denies dysuria or polyuria.    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  - Right iliac fossa transplant kidney with ureteral stent extending from the right renal pelvis into the bladder.  - Findings hydronephrosis and urothelial thickening of the renal transplant.  - CMV PCR viral load (6/3): 326, monitor CMV CPR weekly    #CHELA and hyperkalemia:  - Trend BMP  - Slowing improving, feel that may reach to a plateau?    #Prostate cancer:  - On Orgovyx for prostate cancer. He is pending radiation therapy after finishing his oral therapy.    Clemente Oleary MD, PGY4   ID fellow  Pager: [phone]  After 5pm/weekends call [phone]    d/w Dr. Hall  Recs conveyed to primary team   70M with PMHx of right renal transplant (~2018) hx of dysfunction due to strictures and known hydronephrosis, bilateral nephrectomy (secondary to RCC), prostate cancer (on active treatment), HTN, T2DM, hx of CMV viremia, PsA bacteremia in 2020 (blood PsA not CRE but urine PsA is CRE), chronic diarrhea (multiple scopes without etiology), was sent in by doctor for CHELA and hyperkalemia on routine blood work. He was recently treated for two weeks with ciprofloxacin for pseudomonas urinary tract infection (known to be colonized). Patient denies dysuria or polyuria.    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  - Right iliac fossa transplant kidney with ureteral stent extending from the right renal pelvis into the bladder.  - Findings hydronephrosis and urothelial thickening of the renal transplant.  - CMV PCR viral load (6/3): 326, monitor CMV CPR weekly (we placed CMV PCR oder for tomorrow morning)    #CHELA and hyperkalemia:  - Trend BMP  - Slowing improving, feel that may reach to a plateau?    #Prostate cancer:  - On Orgovyx for prostate cancer. He is pending radiation therapy after finishing his oral therapy.    Clemente Oleary MD, PGY4   ID fellow  Pager: [phone]  After 5pm/weekends call [phone]    d/w Dr. Hall  Recs conveyed to primary team   70M with PMHx of right renal transplant (~2018) hx of dysfunction due to strictures and known hydronephrosis, bilateral nephrectomy (secondary to RCC), prostate cancer (on active treatment), HTN, T2DM, hx of CMV viremia, PsA bacteremia in 2020 (blood PsA not CRE but urine PsA is CRE), chronic diarrhea (multiple scopes without etiology), was sent in by doctor for CHELA and hyperkalemia on routine blood work. He was recently treated for two weeks with ciprofloxacin for pseudomonas urinary tract infection (known to be colonized). Patient denies dysuria or polyuria.    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  - Right iliac fossa transplant kidney with ureteral stent extending from the right renal pelvis into the bladder.  - Findings hydronephrosis and urothelial thickening of the renal transplant.  - CMV PCR viral load (6/3): 326, monitor CMV CPR weekly (we placed CMV PCR oder for tomorrow morning)    #CHELA and hyperkalemia:  - Trend BMP  - Slowing improving, feel that may reach to a plateau?    #Prostate cancer:  - On Orgovyx for prostate cancer. He is pending radiation therapy after finishing his oral therapy.    Clemente Oleary MD, PGY4   ID fellow  Pager: [phone]  After 5pm/weekends call [phone]    d/w Dr. Hall  Recs conveyed to primary team

## 2021-06-07 NOTE — PROGRESS NOTE ADULT - PROBLEM SELECTOR PLAN 5
Possibly due to renal dysfunction, but given prostate cancer need to consider bony mets as well  -normal PTH    -monitor level, currently asymptomatic Possibly due to renal dysfunction, but given prostate cancer need to consider bony mets as well  -normal PTH    -monitor level/ currently stable

## 2021-06-07 NOTE — PROGRESS NOTE ADULT - PROBLEM SELECTOR PLAN 1
Prerenal in setting of complicated uti. Also post renal. Imaging noted.  -Transplant Nephrology consulted and will continue with Envarsus, Prednisone and Cellcept  -urine cx showing zosyn sensitive pseudomonas again  -Uro input appreciated --> defer to t/p surg for stent mgmt. Will need their input during admission.   -Follow up with Transplant ID & Nephrology.  -Cr steadily improving. Prerenal in setting of complicated uti. Also post renal. Imaging noted.  -Transplant Nephrology consulted and will continue with Envarsus, Prednisone and Cellcept  -urine cx showing zosyn sensitive pseudomonas again---> CW ZOsyn up to 6/8 as per ID   -Uro input appreciated --> defer to t/p surg for stent mgmt. Will need their input during admission.   -Follow up with Transplant ID & Nephrology.  -Cr steadily improving.

## 2021-06-07 NOTE — PROGRESS NOTE ADULT - ASSESSMENT
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 He has active prostate cancer or rx with Orgovyx.       1. CHELA in  the setting of UTI and obstruction    - S/p armenta. Non-oliguric. Creatinine improving, awaiting today's labs.    - Will need to be dc'ed with armenta and have outpt urology f/u    2. Pseudomonas UTI resistant to fluoroquinolones. On Zosyn, Id following - needs rx for 7 days total     3. S/p DDRT in 2018. Giovanny creatinine 1.9 but recently 3.2-3.4mg/dL due to recurrent CHELA and pyelo.     4. Immunosuppression   - Continue Envarsus, Cellcept 500mg po bid, Prednisone 5mg daily  - Please check daily AM tacrolimus trough, 30 mins prior to AM dose    5. Chronic metabolic acidosis - continue bicitra 30ml BID    6. HTN   - Good BP control on Nifedipine and metoprolol

## 2021-06-08 LAB
ANION GAP SERPL CALC-SCNC: 13 MMOL/L — SIGNIFICANT CHANGE UP (ref 5–17)
BUN SERPL-MCNC: 35 MG/DL — HIGH (ref 7–23)
CALCIUM SERPL-MCNC: 10.4 MG/DL — SIGNIFICANT CHANGE UP (ref 8.4–10.5)
CHLORIDE SERPL-SCNC: 105 MMOL/L — SIGNIFICANT CHANGE UP (ref 96–108)
CO2 SERPL-SCNC: 21 MMOL/L — LOW (ref 22–31)
CREAT SERPL-MCNC: 3.03 MG/DL — HIGH (ref 0.5–1.3)
GLUCOSE BLDC GLUCOMTR-MCNC: 127 MG/DL — HIGH (ref 70–99)
GLUCOSE BLDC GLUCOMTR-MCNC: 131 MG/DL — HIGH (ref 70–99)
GLUCOSE BLDC GLUCOMTR-MCNC: 144 MG/DL — HIGH (ref 70–99)
GLUCOSE BLDC GLUCOMTR-MCNC: 176 MG/DL — HIGH (ref 70–99)
GLUCOSE SERPL-MCNC: 218 MG/DL — HIGH (ref 70–99)
POTASSIUM SERPL-MCNC: 4.8 MMOL/L — SIGNIFICANT CHANGE UP (ref 3.5–5.3)
POTASSIUM SERPL-SCNC: 4.8 MMOL/L — SIGNIFICANT CHANGE UP (ref 3.5–5.3)
SODIUM SERPL-SCNC: 139 MMOL/L — SIGNIFICANT CHANGE UP (ref 135–145)
TACROLIMUS SERPL-MCNC: 6.2 NG/ML — SIGNIFICANT CHANGE UP

## 2021-06-08 PROCEDURE — 99233 SBSQ HOSP IP/OBS HIGH 50: CPT

## 2021-06-08 PROCEDURE — 99232 SBSQ HOSP IP/OBS MODERATE 35: CPT

## 2021-06-08 RX ORDER — PIPERACILLIN AND TAZOBACTAM 4; .5 G/20ML; G/20ML
3.38 INJECTION, POWDER, LYOPHILIZED, FOR SOLUTION INTRAVENOUS EVERY 8 HOURS
Refills: 0 | Status: DISCONTINUED | OUTPATIENT
Start: 2021-06-08 | End: 2021-06-09

## 2021-06-08 RX ADMIN — PIPERACILLIN AND TAZOBACTAM 25 GRAM(S): 4; .5 INJECTION, POWDER, LYOPHILIZED, FOR SOLUTION INTRAVENOUS at 21:51

## 2021-06-08 RX ADMIN — Medication 60 MILLIGRAM(S): at 05:42

## 2021-06-08 RX ADMIN — MYCOPHENOLATE MOFETIL 250 MILLIGRAM(S): 250 CAPSULE ORAL at 05:42

## 2021-06-08 RX ADMIN — Medication 100 MILLIGRAM(S): at 11:25

## 2021-06-08 RX ADMIN — HEPARIN SODIUM 5000 UNIT(S): 5000 INJECTION INTRAVENOUS; SUBCUTANEOUS at 13:04

## 2021-06-08 RX ADMIN — Medication 1: at 13:05

## 2021-06-08 RX ADMIN — Medication 1 TABLET(S): at 11:24

## 2021-06-08 RX ADMIN — MAGNESIUM OXIDE 400 MG ORAL TABLET 400 MILLIGRAM(S): 241.3 TABLET ORAL at 11:25

## 2021-06-08 RX ADMIN — Medication 30 MILLILITER(S): at 17:40

## 2021-06-08 RX ADMIN — HEPARIN SODIUM 5000 UNIT(S): 5000 INJECTION INTRAVENOUS; SUBCUTANEOUS at 21:51

## 2021-06-08 RX ADMIN — PIPERACILLIN AND TAZOBACTAM 25 GRAM(S): 4; .5 INJECTION, POWDER, LYOPHILIZED, FOR SOLUTION INTRAVENOUS at 05:42

## 2021-06-08 RX ADMIN — TAMSULOSIN HYDROCHLORIDE 0.4 MILLIGRAM(S): 0.4 CAPSULE ORAL at 21:50

## 2021-06-08 RX ADMIN — PIPERACILLIN AND TAZOBACTAM 25 GRAM(S): 4; .5 INJECTION, POWDER, LYOPHILIZED, FOR SOLUTION INTRAVENOUS at 13:04

## 2021-06-08 RX ADMIN — Medication 15 MILLILITER(S): at 05:43

## 2021-06-08 RX ADMIN — MYCOPHENOLATE MOFETIL 250 MILLIGRAM(S): 250 CAPSULE ORAL at 17:40

## 2021-06-08 RX ADMIN — Medication 5 MILLIGRAM(S): at 05:42

## 2021-06-08 RX ADMIN — HEPARIN SODIUM 5000 UNIT(S): 5000 INJECTION INTRAVENOUS; SUBCUTANEOUS at 05:42

## 2021-06-08 RX ADMIN — Medication 100 MILLIGRAM(S): at 05:43

## 2021-06-08 RX ADMIN — TACROLIMUS 7 MILLIGRAM(S): 5 CAPSULE ORAL at 11:24

## 2021-06-08 NOTE — PROGRESS NOTE ADULT - ATTENDING COMMENTS
70 yr old man with DDRT in 2018, baseline creatinine 3.2mg/dL, transplant ureteric stricture with stent, prostate cancer on active treatment admitted with pseudomonas UTI and CHELA due to obstructive uropathy.   - Clinically improving with IV zosyn. CHELA improving, creatinine back to baseline   - Immunosuppression Envarsus 7mg daily, Cellcept 500mg po bid, prednisone 5mg daily.  - Will need to keep foely in for now, urology f/u as outpatient.
Completing 7 day course of zosyn for pseudomonas UTI.   CHELA has recovered and renal function is back to baseline   Immunosuppression - Envarsus 7mg daily, Cellcept reduced to 250mg bid for CMV viremia, continue pred 5mg daily. F/u repeat CMV PCR.   F/u with Dr. Bhakta
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    Ultrasound with bladder wall thickening and urothelial thickening  Favor empiric antibiotic coverage pending culture results (and possible biopsy data)  I would utilize Zosyn given prior Carbapenem resistant and intermediate pseudomonas urinary isolates  Intermittently isolates have also been Cefepime intermediate  Thankfully BUN/Cr is improving    I will continue to follow. Please feel free to contact me with any further questions.    Zackery Hall M.D.  [facility] Division of Infectious Disease  8AM-5PM: Pager Number [phone]  After Hours (or if no response): Please contact the Infectious Diseases Office at [phone]
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    Ultrasound with bladder wall thickening and urothelial thickening  Favor empiric antibiotic coverage   Pseudomonas isolate is susceptible to Zosyn  Thankfully BUN/Cr is improving    I will continue to follow. Please feel free to contact me with any further questions.    Zackery Hall M.D.  [facility] Division of Infectious Disease  8AM-5PM: Pager Number [phone]  After Hours (or if no response): Please contact the Infectious Diseases Office at [phone]
Patient on empiric Zosyn for possible UTI contributing to CHELA in a renal transplant  Tomorrow is day 7 of antibiotics  Would increase Zosyn to Q8H (ordered)    Patient also with elevated CMV PCR to 329 on 6/3  I would check repeat CMV PCR tomorrow (as may be discharged within 24 hours)    I will continue to follow. Please feel free to contact me with any further questions.    Zackery Hall M.D.  [facility] Division of Infectious Disease  8AM-5PM: Pager Number [phone]  After Hours (or if no response): Please contact the Infectious Diseases Office at [phone]     The above assessment and plan were discussed with Dr Castillo

## 2021-06-08 NOTE — DIETITIAN INITIAL EVALUATION ADULT. - PERTINENT MEDS FT
MEDICATIONS  (STANDING):  allopurinol 100 milliGRAM(s) Oral daily  citric acid/sodium citrate Solution 30 milliLiter(s) Oral two times a day  dextrose 50% Injectable 25 Gram(s) IV Push once  dextrose 50% Injectable 25 Gram(s) IV Push once  heparin   Injectable 5000 Unit(s) SubCutaneous every 8 hours  insulin lispro (ADMELOG) corrective regimen sliding scale   SubCutaneous three times a day before meals  insulin lispro (ADMELOG) corrective regimen sliding scale   SubCutaneous at bedtime  magnesium oxide 400 milliGRAM(s) Oral daily  metoprolol succinate  milliGRAM(s) Oral daily  mycophenolate mofetil 250 milliGRAM(s) Oral two times a day  NIFEdipine XL 60 milliGRAM(s) Oral daily  piperacillin/tazobactam IVPB.. 3.375 Gram(s) IV Intermittent every 12 hours  predniSONE   Tablet 5 milliGRAM(s) Oral daily  Relugolix (Orgovyx) tablets 120 milliGRAM(s) 120 milliGRAM(s) Oral daily  tacrolimus ER Tablet (ENVARSUS XR) 7 milliGRAM(s) Oral <User Schedule>  tamsulosin 0.4 milliGRAM(s) Oral at bedtime  trimethoprim   80 mG/sulfamethoxazole 400 mG 1 Tablet(s) Oral daily    MEDICATIONS  (PRN):

## 2021-06-08 NOTE — PROGRESS NOTE ADULT - PROBLEM SELECTOR PLAN 1
Prerenal in setting of complicated uti. Also post renal. Imaging noted.  -Transplant Nephrology consulted and will continue with Envarsus, Prednisone and Cellcept which was decreased  -urine cx showing zosyn sensitive pseudomonas again---> CW ZOsyn ( 6/2-6/4;  6/4) --> dose was increased on 6/8   -Uro input appreciated --> defer to t/p surg for stent mgmt. Awaiting on final rec from transplant team about stent   -Follow up with Transplant ID & Nephrology, urology.  -Cr improving today

## 2021-06-08 NOTE — PROGRESS NOTE ADULT - PROBLEM SELECTOR PLAN 5
Possibly due to renal dysfunction, but given prostate cancer need to consider bony mets as well  -normal PTH    -monitor level/ currently stable

## 2021-06-08 NOTE — DIETITIAN INITIAL EVALUATION ADULT. - CONTINUE CURRENT NUTRITION CARE PLAN
consider liberalizing renal diet and restrict phosphorous, sodium and potassium only/yes consider liberalizing renal diet and restrict phosphorous, sodium and potassium only-discussed with NP/yes

## 2021-06-08 NOTE — PROGRESS NOTE ADULT - SUBJECTIVE AND OBJECTIVE BOX
[facility] DIVISION OF KIDNEY DISEASES AND HYPERTENSION -- FOLLOW UP NOTE  --------------------------------------------------------------------------------  Luis F Grullon   Nephrology Fellow  Pager NS: [phone]/ [facility]: [phone]  (After 5 pm or on weekends please page the on-call fellow, can view the schedule on [employer]. Login is marimar zamudio, schedule under [facility] medicine, psych, derm.      Patient is a 70y old  Male who presents with a chief complaint of Sent in for Hyperkalemia and worsening renal function (07 Jun 2021 13:49)      24 hour events/subjective: Patient seen and examined at the bedside. Vital signs, labs, medications reviewed. MMF reduced yest to 250mg BID given the elevated CMV PCR. Pt w/o complaints this am.         PAST HISTORY  --------------------------------------------------------------------------------  No significant changes to PMH, PSH, FHx, SHx, unless otherwise noted    ALLERGIES & MEDICATIONS  --------------------------------------------------------------------------------  Allergies    No Known Allergies    Intolerances      Standing Inpatient Medications  allopurinol 100 milliGRAM(s) Oral daily  citric acid/sodium citrate Solution 30 milliLiter(s) Oral two times a day  dextrose 50% Injectable 25 Gram(s) IV Push once  dextrose 50% Injectable 25 Gram(s) IV Push once  heparin   Injectable 5000 Unit(s) SubCutaneous every 8 hours  insulin lispro (ADMELOG) corrective regimen sliding scale   SubCutaneous three times a day before meals  insulin lispro (ADMELOG) corrective regimen sliding scale   SubCutaneous at bedtime  magnesium oxide 400 milliGRAM(s) Oral daily  metoprolol succinate  milliGRAM(s) Oral daily  mycophenolate mofetil 250 milliGRAM(s) Oral two times a day  NIFEdipine XL 60 milliGRAM(s) Oral daily  piperacillin/tazobactam IVPB.. 3.375 Gram(s) IV Intermittent every 12 hours  predniSONE   Tablet 5 milliGRAM(s) Oral daily  Relugolix (Orgovyx) tablets 120 milliGRAM(s) 120 milliGRAM(s) Oral daily  tacrolimus ER Tablet (ENVARSUS XR) 7 milliGRAM(s) Oral <User Schedule>  tamsulosin 0.4 milliGRAM(s) Oral at bedtime  trimethoprim   80 mG/sulfamethoxazole 400 mG 1 Tablet(s) Oral daily    PRN Inpatient Medications      REVIEW OF SYSTEMS  --------------------------------------------------------------------------------  Gen: No fevers/chills  Skin: No rashes  Head/Eyes/Ears: Normal hearing, no difficulty seeing  Respiratory: No dyspnea, cough  CV: No chest pain  GI: No abdominal pain, + diarrhea  : No dysuria, hematuria  MSK: No  edema      >>> <<<    VITALS/PHYSICAL EXAM  --------------------------------------------------------------------------------  T(C): 36.4 (06-08-21 @ 05:02), Max: 36.6 (06-07-21 @ 13:07)  HR: 57 (06-08-21 @ 05:02) (57 - 62)  BP: 154/76 (06-08-21 @ 05:02) (123/62 - 154/76)  RR: 18 (06-08-21 @ 05:02) (18 - 18)  SpO2: 99% (06-08-21 @ 05:02) (98% - 99%)  Wt(kg): --        06-07-21 @ 07:01  -  06-08-21 @ 07:00  --------------------------------------------------------  IN: 900 mL / OUT: 950 mL / NET: -50 mL      Physical Exam:    	Gen: NAD  	HEENT: Anicteric   	Pulm: CTA B/L  	CV: RRR, S1S2  	Abd: +BS, soft, nontender/nondistended                      Transplant: No tenderness, swelling  	: + armenta with clear urine  	LE: Warm, no edema  	Psych: Normal affect and mood  	Skin: Warm, without rashes      LABS/STUDIES  --------------------------------------------------------------------------------              10.9   5.01  >-----------<  160      [06-07-21 @ 10:57]              35.9     137  |  105  |  36  ----------------------------<  209      [06-07-21 @ 10:57]  4.9   |  19  |  3.22        Ca     10.2     [06-07-21 @ 10:57]            Creatinine Trend:  SCr 3.22 [06-07 @ 10:57]  SCr 3.32 [06-06 @ 11:00]  SCr 3.58 [06-05 @ 10:39]  SCr 3.81 [06-04 @ 06:36]  SCr 4.52 [06-03 @ 06:46]    Urinalysis - [06-01-21 @ 23:37]      Color Yellow / Appearance Turbid / SG 1.018 / pH 6.0      Gluc Negative / Ketone Negative  / Bili Negative / Urobili Negative       Blood Large / Protein 100 mg/dL / Leuk Est Large / Nitrite Positive      RBC 30 /  / Hyaline 4 / Gran  / Sq Epi  / Non Sq Epi 1 / Bacteria Negative    Urine Creatinine 169      [06-02-21 @ 06:38]  Urine Protein 95      [06-02-21 @ 06:38]  Urine Sodium 65      [06-02-21 @ 06:38]  Urine Urea Nitrogen 560      [06-02-21 @ 08:31]    Iron 75, TIBC 278, %sat 27      [06-02-21 @ 16:51]  Ferritin 450      [06-02-21 @ 17:06]  PTH -- (Ca 10.3)      [06-04-21 @ 09:37]   22  PTH -- (Ca --)      [06-02-21 @ 17:06]   15  Vitamin D (25OH) 37.0      [06-04-21 @ 09:37]  HbA1c 6.0      [08-09-19 @ 23:48]

## 2021-06-08 NOTE — DIETITIAN INITIAL EVALUATION ADULT. - PERTINENT LABORATORY DATA
06-07 @ 10:57: Na 137, BUN 36<H>, Cr 3.22<H>, <H>, K+ 4.9, Phos --, Mg --, Alk Phos --, ALT/SGPT --, AST/SGOT --, HbA1c --

## 2021-06-08 NOTE — PROGRESS NOTE ADULT - SUBJECTIVE AND OBJECTIVE BOX
Patient is a 70y old  Male who presents with a chief complaint of Sent in for Hyperkalemia and worsening renal function (08 Jun 2021 10:34)      SUBJECTIVE / OVERNIGHT EVENTS:   No fever , pos chronic diarrhea , not new as per pt , no abd pain.        ADDITIONAL REVIEW OF SYSTEMS: Negative except for above    MEDICATIONS  (STANDING):  allopurinol 100 milliGRAM(s) Oral daily  citric acid/sodium citrate Solution 30 milliLiter(s) Oral two times a day  dextrose 50% Injectable 25 Gram(s) IV Push once  dextrose 50% Injectable 25 Gram(s) IV Push once  heparin   Injectable 5000 Unit(s) SubCutaneous every 8 hours  insulin lispro (ADMELOG) corrective regimen sliding scale   SubCutaneous three times a day before meals  insulin lispro (ADMELOG) corrective regimen sliding scale   SubCutaneous at bedtime  magnesium oxide 400 milliGRAM(s) Oral daily  metoprolol succinate  milliGRAM(s) Oral daily  mycophenolate mofetil 250 milliGRAM(s) Oral two times a day  NIFEdipine XL 60 milliGRAM(s) Oral daily  piperacillin/tazobactam IVPB.. 3.375 Gram(s) IV Intermittent every 8 hours  predniSONE   Tablet 5 milliGRAM(s) Oral daily  Relugolix (Orgovyx) tablets 120 milliGRAM(s) 120 milliGRAM(s) Oral daily  tacrolimus ER Tablet (ENVARSUS XR) 7 milliGRAM(s) Oral <User Schedule>  tamsulosin 0.4 milliGRAM(s) Oral at bedtime  trimethoprim   80 mG/sulfamethoxazole 400 mG 1 Tablet(s) Oral daily    MEDICATIONS  (PRN):      CAPILLARY BLOOD GLUCOSE      POCT Blood Glucose.: 131 mg/dL (08 Jun 2021 17:30)  POCT Blood Glucose.: 176 mg/dL (08 Jun 2021 12:59)  POCT Blood Glucose.: 144 mg/dL (08 Jun 2021 09:25)  POCT Blood Glucose.: 151 mg/dL (07 Jun 2021 22:16)    I&O's Summary    07 Jun 2021 07:01  -  08 Jun 2021 07:00  --------------------------------------------------------  IN: 900 mL / OUT: 950 mL / NET: -50 mL    08 Jun 2021 07:01  -  08 Jun 2021 19:21  --------------------------------------------------------  IN: 940 mL / OUT: 0 mL / NET: 940 mL        PHYSICAL EXAM:  Vital Signs Last 24 Hrs  T(C): 36.7 (08 Jun 2021 12:10), Max: 36.7 (08 Jun 2021 12:10)  T(F): 98 (08 Jun 2021 12:10), Max: 98 (08 Jun 2021 12:10)  HR: 82 (08 Jun 2021 12:10) (57 - 82)  BP: 127/67 (08 Jun 2021 12:10) (127/67 - 154/76)  BP(mean): --  RR: 18 (08 Jun 2021 12:10) (18 - 18)  SpO2: 99% (08 Jun 2021 12:10) (98% - 99%)    PHYSICAL EXAM:  GENERAL: NAD, well-developed  HEAD:  Atraumatic, Normocephalic  EYES:  conjunctiva and sclera clear  NECK: Supple, No JVD  CHEST/LUNG: Clear to auscultation bilaterally; No wheeze  HEART: Regular rate and rhythm; No murmurs, rubs, or gallops  ABDOMEN: Soft, Nontender, Nondistended; Bowel sounds present  EXTREMITIES:  2+ Peripheral Pulses, No clubbing, cyanosis, or edema  NEUROLOGY: non-focal, AAOx3  IBARRA in place      LABS:                        10.9   5.01  )-----------( 160      ( 07 Jun 2021 10:57 )             35.9     06-08    139  |  105  |  35<H>  ----------------------------<  218<H>  4.8   |  21<L>  |  3.03<H>    Ca    10.4      08 Jun 2021 11:17                  RADIOLOGY & ADDITIONAL TESTS:    Imaging Personally Reviewed:    Electrocardiogram Personally Reviewed:    COORDINATION OF CARE:  Care Discussed with Consultants/Other Providers [Y/N]:  Prior or Outpatient Records Reviewed [Y/N]:

## 2021-06-08 NOTE — PROGRESS NOTE ADULT - PROBLEM SELECTOR PLAN 4
-Continue with home Prednisone, CellCept, and Tacrolimus  -F/U tacro level w/ dosing--> tacro 7mg    -Continue with Bactrim PPx  -Follow up with Transplant ID & Nephrology

## 2021-06-08 NOTE — PROGRESS NOTE ADULT - PROBLEM SELECTOR PLAN 3
Complicated uti as above.  C/w zosyn  as per ID to complete 7 days   will discuss with ID about final day of Zosyn

## 2021-06-08 NOTE — PROGRESS NOTE ADULT - ASSESSMENT
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 He has active prostate cancer or rx with Orgovyx.       1. CHELA in  the setting of UTI and obstruction    - S/p armenta. Non-oliguric. Creatinine improving, awaiting today's labs.    - Will need to be dc'ed with armenta and have outpt urology f/u  - Will also need to f/u with Dr. Arian Bhakta from transplant     2. Pseudomonas UTI resistant to fluoroquinolones. On Zosyn, would increase to BID dosing. Today should be day 7    3. S/p DDRT in 2018. Giovanny creatinine 1.9 but recently 3.2-3.4mg/dL due to recurrent CHELA and pyelo.     4. Immunosuppression   - Continue Envarsus 7mg, Cellcept reduced to 250mg po bid given the CMV PCR, Prednisone 5mg daily  - Please check daily AM tacrolimus trough, 30 mins prior to AM dose  - Patient will need repeat CMV PCR next week, can f/u with Dr. Arian Bhakta at transplant office. Should be discharged on lower dose of MMF     5. Chronic metabolic acidosis - continue bicitra 30ml BID. Also taking for his hyperoxaluria     6. HTN   - Good BP control on Nifedipine and metoprolol

## 2021-06-08 NOTE — DIETITIAN INITIAL EVALUATION ADULT. - OTHER INFO
Intake : >75%  Denies nausea/vomit :  Denies difficulty chewing /swallow :  Denies diarrhea/constipation:  Last BM : this morning  NKFA   Ht: 5'10.5"  Ht taken from pt  Dosing ht: 179.1cm  Dosing wt: 95.3kg  BMI: 31.5  BMI calculated using wt from flow sheet  BMI calculated using ht from pt  Education Provided : Acute Kidney Injury Nutrition Therapy  pressure injury: none

## 2021-06-09 ENCOUNTER — TRANSCRIPTION ENCOUNTER (OUTPATIENT)
Age: 71
End: 2021-06-09

## 2021-06-09 VITALS
HEART RATE: 71 BPM | RESPIRATION RATE: 18 BRPM | DIASTOLIC BLOOD PRESSURE: 69 MMHG | OXYGEN SATURATION: 100 % | TEMPERATURE: 97 F | SYSTOLIC BLOOD PRESSURE: 123 MMHG

## 2021-06-09 LAB
ALBUMIN SERPL ELPH-MCNC: 3.7 G/DL — SIGNIFICANT CHANGE UP (ref 3.3–5)
ALP SERPL-CCNC: 69 U/L — SIGNIFICANT CHANGE UP (ref 40–120)
ALT FLD-CCNC: 42 U/L — SIGNIFICANT CHANGE UP (ref 10–45)
ANION GAP SERPL CALC-SCNC: 15 MMOL/L — SIGNIFICANT CHANGE UP (ref 5–17)
AST SERPL-CCNC: 45 U/L — HIGH (ref 10–40)
BILIRUB SERPL-MCNC: 0.2 MG/DL — SIGNIFICANT CHANGE UP (ref 0.2–1.2)
BUN SERPL-MCNC: 30 MG/DL — HIGH (ref 7–23)
CALCIUM SERPL-MCNC: 10.1 MG/DL — SIGNIFICANT CHANGE UP (ref 8.4–10.5)
CHLORIDE SERPL-SCNC: 106 MMOL/L — SIGNIFICANT CHANGE UP (ref 96–108)
CO2 SERPL-SCNC: 15 MMOL/L — LOW (ref 22–31)
CREAT SERPL-MCNC: 2.77 MG/DL — HIGH (ref 0.5–1.3)
GLUCOSE BLDC GLUCOMTR-MCNC: 124 MG/DL — HIGH (ref 70–99)
GLUCOSE BLDC GLUCOMTR-MCNC: 155 MG/DL — HIGH (ref 70–99)
GLUCOSE SERPL-MCNC: 174 MG/DL — HIGH (ref 70–99)
HCT VFR BLD CALC: 34.9 % — LOW (ref 39–50)
HGB BLD-MCNC: 10.8 G/DL — LOW (ref 13–17)
MCHC RBC-ENTMCNC: 28.5 PG — SIGNIFICANT CHANGE UP (ref 27–34)
MCHC RBC-ENTMCNC: 30.9 GM/DL — LOW (ref 32–36)
MCV RBC AUTO: 92.1 FL — SIGNIFICANT CHANGE UP (ref 80–100)
NRBC # BLD: 0 /100 WBCS — SIGNIFICANT CHANGE UP (ref 0–0)
PLATELET # BLD AUTO: 157 K/UL — SIGNIFICANT CHANGE UP (ref 150–400)
POTASSIUM SERPL-MCNC: 4.9 MMOL/L — SIGNIFICANT CHANGE UP (ref 3.5–5.3)
POTASSIUM SERPL-SCNC: 4.9 MMOL/L — SIGNIFICANT CHANGE UP (ref 3.5–5.3)
PROT SERPL-MCNC: 7.1 G/DL — SIGNIFICANT CHANGE UP (ref 6–8.3)
RBC # BLD: 3.79 M/UL — LOW (ref 4.2–5.8)
RBC # FLD: 14.6 % — HIGH (ref 10.3–14.5)
SODIUM SERPL-SCNC: 136 MMOL/L — SIGNIFICANT CHANGE UP (ref 135–145)
TACROLIMUS SERPL-MCNC: 9.3 NG/ML — SIGNIFICANT CHANGE UP
WBC # BLD: 7.06 K/UL — SIGNIFICANT CHANGE UP (ref 3.8–10.5)
WBC # FLD AUTO: 7.06 K/UL — SIGNIFICANT CHANGE UP (ref 3.8–10.5)

## 2021-06-09 PROCEDURE — 82728 ASSAY OF FERRITIN: CPT

## 2021-06-09 PROCEDURE — 84132 ASSAY OF SERUM POTASSIUM: CPT

## 2021-06-09 PROCEDURE — 87186 SC STD MICRODIL/AGAR DIL: CPT

## 2021-06-09 PROCEDURE — 86769 SARS-COV-2 COVID-19 ANTIBODY: CPT

## 2021-06-09 PROCEDURE — 83735 ASSAY OF MAGNESIUM: CPT

## 2021-06-09 PROCEDURE — 83550 IRON BINDING TEST: CPT

## 2021-06-09 PROCEDURE — U0005: CPT

## 2021-06-09 PROCEDURE — 80197 ASSAY OF TACROLIMUS: CPT

## 2021-06-09 PROCEDURE — 82652 VIT D 1 25-DIHYDROXY: CPT

## 2021-06-09 PROCEDURE — 82570 ASSAY OF URINE CREATININE: CPT

## 2021-06-09 PROCEDURE — 83540 ASSAY OF IRON: CPT

## 2021-06-09 PROCEDURE — U0003: CPT

## 2021-06-09 PROCEDURE — 76776 US EXAM K TRANSPL W/DOPPLER: CPT

## 2021-06-09 PROCEDURE — 84156 ASSAY OF PROTEIN URINE: CPT

## 2021-06-09 PROCEDURE — 85014 HEMATOCRIT: CPT

## 2021-06-09 PROCEDURE — 82306 VITAMIN D 25 HYDROXY: CPT

## 2021-06-09 PROCEDURE — 84154 ASSAY OF PSA FREE: CPT

## 2021-06-09 PROCEDURE — 85018 HEMOGLOBIN: CPT

## 2021-06-09 PROCEDURE — 87799 DETECT AGENT NOS DNA QUANT: CPT

## 2021-06-09 PROCEDURE — 82435 ASSAY OF BLOOD CHLORIDE: CPT

## 2021-06-09 PROCEDURE — 84100 ASSAY OF PHOSPHORUS: CPT

## 2021-06-09 PROCEDURE — 84153 ASSAY OF PSA TOTAL: CPT

## 2021-06-09 PROCEDURE — 76942 ECHO GUIDE FOR BIOPSY: CPT

## 2021-06-09 PROCEDURE — 96375 TX/PRO/DX INJ NEW DRUG ADDON: CPT

## 2021-06-09 PROCEDURE — 81001 URINALYSIS AUTO W/SCOPE: CPT

## 2021-06-09 PROCEDURE — 99285 EMERGENCY DEPT VISIT HI MDM: CPT | Mod: 25

## 2021-06-09 PROCEDURE — 82947 ASSAY GLUCOSE BLOOD QUANT: CPT

## 2021-06-09 PROCEDURE — 85027 COMPLETE CBC AUTOMATED: CPT

## 2021-06-09 PROCEDURE — 80048 BASIC METABOLIC PNL TOTAL CA: CPT

## 2021-06-09 PROCEDURE — 96374 THER/PROPH/DIAG INJ IV PUSH: CPT

## 2021-06-09 PROCEDURE — 82310 ASSAY OF CALCIUM: CPT

## 2021-06-09 PROCEDURE — 87040 BLOOD CULTURE FOR BACTERIA: CPT

## 2021-06-09 PROCEDURE — 99239 HOSP IP/OBS DSCHRG MGMT >30: CPT

## 2021-06-09 PROCEDURE — 84300 ASSAY OF URINE SODIUM: CPT

## 2021-06-09 PROCEDURE — 84560 ASSAY OF URINE/URIC ACID: CPT

## 2021-06-09 PROCEDURE — 83605 ASSAY OF LACTIC ACID: CPT

## 2021-06-09 PROCEDURE — 84295 ASSAY OF SERUM SODIUM: CPT

## 2021-06-09 PROCEDURE — 80053 COMPREHEN METABOLIC PANEL: CPT

## 2021-06-09 PROCEDURE — 83970 ASSAY OF PARATHORMONE: CPT

## 2021-06-09 PROCEDURE — 82330 ASSAY OF CALCIUM: CPT

## 2021-06-09 PROCEDURE — 74176 CT ABD & PELVIS W/O CONTRAST: CPT

## 2021-06-09 PROCEDURE — 85025 COMPLETE CBC W/AUTO DIFF WBC: CPT

## 2021-06-09 PROCEDURE — 82803 BLOOD GASES ANY COMBINATION: CPT

## 2021-06-09 PROCEDURE — 84540 ASSAY OF URINE/UREA-N: CPT

## 2021-06-09 PROCEDURE — 82962 GLUCOSE BLOOD TEST: CPT

## 2021-06-09 PROCEDURE — 83036 HEMOGLOBIN GLYCOSYLATED A1C: CPT

## 2021-06-09 PROCEDURE — 87086 URINE CULTURE/COLONY COUNT: CPT

## 2021-06-09 RX ORDER — MYCOPHENOLATE MOFETIL 250 MG/1
1 CAPSULE ORAL
Qty: 60 | Refills: 0
Start: 2021-06-09 | End: 2021-07-08

## 2021-06-09 RX ORDER — MYCOPHENOLATE MOFETIL 250 MG/1
1 CAPSULE ORAL
Qty: 0 | Refills: 0 | DISCHARGE

## 2021-06-09 RX ADMIN — Medication 1: at 13:08

## 2021-06-09 RX ADMIN — MYCOPHENOLATE MOFETIL 250 MILLIGRAM(S): 250 CAPSULE ORAL at 16:10

## 2021-06-09 RX ADMIN — Medication 100 MILLIGRAM(S): at 06:52

## 2021-06-09 RX ADMIN — Medication 60 MILLIGRAM(S): at 06:52

## 2021-06-09 RX ADMIN — HEPARIN SODIUM 5000 UNIT(S): 5000 INJECTION INTRAVENOUS; SUBCUTANEOUS at 06:53

## 2021-06-09 RX ADMIN — Medication 30 MILLILITER(S): at 16:10

## 2021-06-09 RX ADMIN — TACROLIMUS 7 MILLIGRAM(S): 5 CAPSULE ORAL at 10:48

## 2021-06-09 RX ADMIN — Medication 30 MILLILITER(S): at 06:53

## 2021-06-09 RX ADMIN — Medication 5 MILLIGRAM(S): at 06:52

## 2021-06-09 RX ADMIN — HEPARIN SODIUM 5000 UNIT(S): 5000 INJECTION INTRAVENOUS; SUBCUTANEOUS at 13:07

## 2021-06-09 RX ADMIN — MYCOPHENOLATE MOFETIL 250 MILLIGRAM(S): 250 CAPSULE ORAL at 06:52

## 2021-06-09 RX ADMIN — Medication 100 MILLIGRAM(S): at 10:48

## 2021-06-09 RX ADMIN — MAGNESIUM OXIDE 400 MG ORAL TABLET 400 MILLIGRAM(S): 241.3 TABLET ORAL at 10:48

## 2021-06-09 RX ADMIN — Medication 1 TABLET(S): at 10:48

## 2021-06-09 NOTE — PROGRESS NOTE ADULT - PROBLEM SELECTOR PLAN 7
-Hold home Jardiance & Sulfonylurea  -FS TID AC & insulin sliding scale
-Hold home Jardiance & Sulfonylurea  -FS TID AC & insulin sliding scale  - stable glucose

## 2021-06-09 NOTE — PROGRESS NOTE ADULT - PROBLEM SELECTOR PROBLEM 7
Type 2 diabetes mellitus

## 2021-06-09 NOTE — DISCHARGE NOTE PROVIDER - NSDCCPCAREPLAN_GEN_ALL_CORE_FT
PRINCIPAL DISCHARGE DIAGNOSIS  Diagnosis: Renal failure (ARF), acute on chronic  Assessment and Plan of Treatment: Renal failure (ARF), acute on chronic  Avoid taking (NSAIDs) - (ex: Ibuprofen, Advil, Celebrex, Naprosyn)  Avoid taking any nephrotoxic agents (can harm kidneys) - Intravenous contrast for diagnostic testing, combination cold medications.  Have all medications adjusted for your renal function by your Health Care Provider.  Blood pressure control is important.  Take all medication as prescribed.        SECONDARY DISCHARGE DIAGNOSES  Diagnosis: Type 2 diabetes mellitus  Assessment and Plan of Treatment: Type 2 diabetes mellitus  A1c on this admission, 6.4%  Continue to take your medications as prescribed by your doctor  Make sure you get your HgA1c checked every three months.  If you take oral diabetes medications, check your blood glucose two times a day.  It's important not to skip any meals.  Keep a list of your current medications including injectables and over the counter medications and bring this medication list with you to all your doctor appointments.  If you have not seen your opthalmologist this year call for appointment.  Check your feet daily for redness, sores, or openings. Do not self treat. If no improvement in two days call your primary care physician for an appointment.  Low blood sugar (hypoglycemia) is a blood sugar below 70mg/dl. Check your blood sugar if you feel signs/symptoms of hypoglycemia. If your blood sugar is below 70 take 15 grams of carbohydrates (ex 4 oz of apple juice, 3-4 glucosr tablets, or 4-6 oz of regular soda) wait 15 minutes and repeat blood sugar to make sure it comes up above 70.  If your blood sugar is above 70 and you are due for a meal, have a meal.  If you are not due for a meal have a snack.  This snack helps keeps your blood sugar at a safe range.      Diagnosis: UTI (urinary tract infection)  Assessment and Plan of Treatment: UTI (urinary tract infection)  You were treated with antibiotic  Follow up with your urologist    Diagnosis: HTN (hypertension)  Assessment and Plan of Treatment: HTN (hypertension)  Low salt diet  Activity as tolerated.  Take all medication as prescribed.  Follow up with your medical doctor for routine blood pressure monitoring at your next visit.  Notify your doctor if you have any of the following symptoms:   Dizziness, Lightheadedness, Blurry vision, Headache, Chest pain, Shortness of breath      Diagnosis: Renal transplant recipient  Assessment and Plan of Treatment: Renal transplant recipient  Continue to take your medications as prescribed by your doctor  Follow up with Dr Bhakta in 1 week    Diagnosis: Hyperkalemia  Assessment and Plan of Treatment: Hyperkalemia  resolved

## 2021-06-09 NOTE — PROGRESS NOTE ADULT - PROBLEM SELECTOR PROBLEM 2
Hyperkalemia
Renal transplant recipient
Hyperkalemia
Renal transplant recipient
Hyperkalemia

## 2021-06-09 NOTE — PROGRESS NOTE ADULT - NSICDXPILOT_GEN_ALL_CORE
[place]
[employer]
[place]

## 2021-06-09 NOTE — PROGRESS NOTE ADULT - PROBLEM SELECTOR PROBLEM 3
UTI (urinary tract infection)
Immunosuppression
UTI (urinary tract infection)
UTI (urinary tract infection)
Immunosuppression
UTI (urinary tract infection)
UTI (urinary tract infection)
R/O UTI (urinary tract infection)
R/O UTI (urinary tract infection)
UTI (urinary tract infection)

## 2021-06-09 NOTE — PROGRESS NOTE ADULT - PROBLEM SELECTOR PROBLEM 5
Hypercalcemia

## 2021-06-09 NOTE — DISCHARGE NOTE PROVIDER - CARE PROVIDER_API CALL
Arian Bhakta  NEPHROLOGY  [address] [place] [address]  Phone: [phone]  Fax: [phone]  Follow Up Time: 1 week    John Phillip)  Urology  [address] [place] [address]  Phone: [phone]  Fax: [phone]  Follow Up Time: 1 week

## 2021-06-09 NOTE — PROGRESS NOTE ADULT - PROBLEM SELECTOR PLAN 3
Complicated uti as above.  completed Zosyn treatment   will be dc with urinary armenta with home care services and f/up with urologist and renal transplant specialist

## 2021-06-09 NOTE — DISCHARGE NOTE PROVIDER - HOSPITAL COURSE
70M with PMHx of right renal transplant (~2018), bilateral nephrectomy (secondary to RCC), prostate cancer (on active treatment), HTN, and T2DM sent in by physician for worsening renal function and hyperkalemia.     Problem/Plan - 1:  ·  Problem: Renal failure (ARF), acute on chronic.  Plan: Prerenal in setting of complicated uti. Also post renal. Imaging noted.  -Transplant Nephrology consulted and will continue with Envarsus, Prednisone and Cellcept which was decreased  -urine cx showing zosyn sensitive pseudomonas again---> CW ZOsyn ( 6/2-6/4;  6/4) --> dose was increased on 6/8   -Uro input appreciated --> defer to t/p surg for stent mgmt. Awaiting on final rec from transplant team about stent   -Follow up with Transplant ID & Nephrology, urology.  -Cr improving today.      Problem/Plan - 2:  ·  Problem: Hyperkalemia.  Plan: S/p lokelma  resolved  renal diet.      Problem/Plan - 3:  ·  Problem: UTI (urinary tract infection).  Plan: Complicated uti as above.  C/w zosyn  as per ID to complete 7 days   will discuss with ID about final day of Zosyn.      Problem/Plan - 4:  ·  Problem: Renal transplant recipient.  Plan: -Continue with home Prednisone, CellCept, and Tacrolimus  -F/U tacro level w/ dosing--> tacro 7mg    -Continue with Bactrim PPx  -Follow up with Transplant ID & Nephrology.      Problem/Plan - 5:  ·  Problem: Hypercalcemia.  Plan: Possibly due to renal dysfunction, but given prostate cancer need to consider bony mets as well  -normal PTH    -monitor level/ currently stable.      Problem/Plan - 6:  Problem: Prostate cancer. Plan: Orgovyx is non-formulary and would need to supply his own- brought it in and needs oncology consult to place order only.  -Pending outpatient follow up for radiation  -Uro input appreciated --> defer to t/p surg for stent mgmt. Will need their input during admission.     Problem/Plan - 7:  ·  Problem: Type 2 diabetes mellitus.  Plan: -Hold home Jardiance & Sulfonylurea  -FS TID AC & insulin sliding scale.      Problem/Plan - 8:  ·  Problem: HTN (hypertension).  Plan: -Continue with Nifedipine    #Diarrhea/ fluctuating as per pt ( chronic)       Plan d/w med np. 70M with PMHx of right renal transplant (~2018), bilateral nephrectomy (secondary to RCC), prostate cancer (on active treatment), HTN, and T2DM sent in by physician for worsening renal function and hyperkalemia.     Problem/Plan - 1:  ·  Problem: Renal failure (ARF), acute on chronic.  Plan: Prerenal in setting of complicated uti. Also post renal. Imaging noted.  -Transplant Nephrology consulted and will continue with Envarsus, Prednisone and Cellcept which was decreased  -urine cx showing zosyn sensitive pseudomonas again---> CW ZOsyn ( 6/2-6/4;  6/4) --> dose was increased on 6/8   -Uro input appreciated --> defer to t/p surg for stent mgmt. Awaiting on final rec from transplant team about stent   -Follow up with Transplant ID & Nephrology, urology.  -Cr improving today.      Problem/Plan - 2:  ·  Problem: Hyperkalemia.  Plan: S/p lokelma  resolved  renal diet.      Problem/Plan - 3:  ·  Problem: UTI (urinary tract infection).  Plan: Complicated uti as above.  C/w zosyn  as per ID to complete 7 days   will discuss with ID about final day of Zosyn.      Problem/Plan - 4:  ·  Problem: Renal transplant recipient.  Plan: -Continue with home Prednisone, CellCept, and Tacrolimus  -F/U tacro level w/ dosing--> tacro 7mg    -Continue with Bactrim PPx  -Follow up with Transplant ID & Nephrology.      Problem/Plan - 5:  ·  Problem: Hypercalcemia.  Plan: Possibly due to renal dysfunction, but given prostate cancer need to consider bony mets as well  -normal PTH    -monitor level/ currently stable.      Problem/Plan - 6:  Problem: Prostate cancer. Plan: Orgovyx is non-formulary and would need to supply his own- brought it in and needs oncology consult to place order only.  -armenta  -Pending outpatient follow up for radiation  -Uro input appreciated --> will follow up with out patient urology for stents     Problem/Plan - 7:  ·  Problem: Type 2 diabetes mellitus.  Plan: -Hold home Jardiance & Sulfonylurea  -FS TID AC & insulin sliding scale.      Problem/Plan - 8:  ·  Problem: HTN (hypertension).  Plan: -Continue with Nifedipine    DCP and medication reconciliation discussed with Dr Castillo and in agreement. Patient will discharge with armenta and follow up with Dr Phillip and Dr Bhakta. Patient completed Moderna COVID vaccine series prior to admission.

## 2021-06-09 NOTE — PROGRESS NOTE ADULT - PROVIDER SPECIALTY LIST ADULT
Hospitalist
Transplant Nephrology
Transplant Nephrology
Transplant ID
Transplant ID
Transplant Nephrology
Transplant ID
Transplant Nephrology
Hospitalist
Transplant Nephrology
Hospitalist

## 2021-06-09 NOTE — DISCHARGE NOTE NURSING/CASE MANAGEMENT/SOCIAL WORK - PATIENT PORTAL LINK FT
You can access the FollowMyHealth Patient Portal offered by [facility] by registering at the following website: [email or web address]. By joining [facility]’s FollowMyHealth portal, you will also be able to view your health information using other applications (apps) compatible with our system.

## 2021-06-09 NOTE — DISCHARGE NOTE PROVIDER - NSDCMRMEDTOKEN_GEN_ALL_CORE_FT
allopurinol 100 mg oral tablet: 1 tab(s) orally once a day  B Complex 50 oral tablet, extended release: 1 tab(s) orally once a day  Bactrim 400 mg-80 mg oral tablet: 1 tab(s) orally once a day  Envarsus XR: 7 milligram(s) orally once a day  Januvia 50 mg oral tablet: 1 tab(s) orally once a day  magnesium oxide 400 mg oral tablet: 1 tab(s) orally once a day  metoprolol succinate 100 mg oral tablet, extended release: 1 tab(s) orally once a day  mycophenolate mofetil 500 mg oral tablet: 1 tab(s) orally 2 times a day  NIFEdipine 30 mg oral tablet, extended release: 2 tab(s) orally once a day  Orgovyx 120 mg oral tablet: 1 tab(s) orally once a day  predniSONE 5 mg oral tablet: 1 tab(s) orally once a day  sodium citrate-citric acid 500 mg-334 mg/5 mL oral solution: 15 milliliter(s) orally 3 times a day  tamsulosin 0.4 mg oral capsule: 1 cap(s) orally once a day   allopurinol 100 mg oral tablet: 1 tab(s) orally once a day  B Complex 50 oral tablet, extended release: 1 tab(s) orally once a day  Bactrim 400 mg-80 mg oral tablet: 1 tab(s) orally once a day  CellCept 250 mg oral capsule: 1 cap(s) orally 2 times a day  Envarsus XR: 7 milligram(s) orally once a day  Januvia 50 mg oral tablet: 1 tab(s) orally once a day  magnesium oxide 400 mg oral tablet: 1 tab(s) orally once a day  metoprolol succinate 100 mg oral tablet, extended release: 1 tab(s) orally once a day  NIFEdipine 30 mg oral tablet, extended release: 2 tab(s) orally once a day  Orgovyx 120 mg oral tablet: 1 tab(s) orally once a day  predniSONE 5 mg oral tablet: 1 tab(s) orally once a day  sodium citrate-citric acid 500 mg-334 mg/5 mL oral solution: 15 milliliter(s) orally 3 times a day  tamsulosin 0.4 mg oral capsule: 1 cap(s) orally once a day

## 2021-06-09 NOTE — PROGRESS NOTE ADULT - REASON FOR ADMISSION
Sent in for Hyperkalemia and worsening renal function

## 2021-06-09 NOTE — DISCHARGE NOTE PROVIDER - INSTRUCTIONS
Cosistent carbohydrate  No concentrated potassium  No concentrated phosphorous  Low salt Consistent carbohydrate  No concentrated potassium  No concentrated phosphorous  Low salt

## 2021-06-09 NOTE — PROGRESS NOTE ADULT - PROBLEM SELECTOR PROBLEM 1
Renal failure (ARF), acute on chronic
CHELA (acute kidney injury)
Renal failure (ARF), acute on chronic
Renal failure (ARF), acute on chronic
CHELA (acute kidney injury)
Renal failure (ARF), acute on chronic

## 2021-06-09 NOTE — PROGRESS NOTE ADULT - PROBLEM SELECTOR PLAN 4
-Continue with home Prednisone, Tacrolimus.   CellCept was decreases / will go home on the reduced dose as per renalt transplant fellow   -F/U tacro level w/ dosing--> tacro 7mg    -Continue with Bactrim PPx  -Follow up with Transplant ID & Nephrology  - Pt will f/up CMV with his renal transplant specialist

## 2021-06-09 NOTE — PROGRESS NOTE ADULT - SUBJECTIVE AND OBJECTIVE BOX
Patient is a 70y old  Male who presents with a chief complaint of Sent in for Hyperkalemia and worsening renal function (09 Jun 2021 09:15)      SUBJECTIVE / OVERNIGHT EVENTS:    Tele reviewed:       ADDITIONAL REVIEW OF SYSTEMS: Negative except for above    MEDICATIONS  (STANDING):  allopurinol 100 milliGRAM(s) Oral daily  citric acid/sodium citrate Solution 30 milliLiter(s) Oral two times a day  dextrose 50% Injectable 25 Gram(s) IV Push once  dextrose 50% Injectable 25 Gram(s) IV Push once  heparin   Injectable 5000 Unit(s) SubCutaneous every 8 hours  insulin lispro (ADMELOG) corrective regimen sliding scale   SubCutaneous at bedtime  insulin lispro (ADMELOG) corrective regimen sliding scale   SubCutaneous three times a day before meals  magnesium oxide 400 milliGRAM(s) Oral daily  metoprolol succinate  milliGRAM(s) Oral daily  mycophenolate mofetil 250 milliGRAM(s) Oral two times a day  NIFEdipine XL 60 milliGRAM(s) Oral daily  predniSONE   Tablet 5 milliGRAM(s) Oral daily  Relugolix (Orgovyx) tablets 120 milliGRAM(s) 120 milliGRAM(s) Oral daily  tacrolimus ER Tablet (ENVARSUS XR) 7 milliGRAM(s) Oral <User Schedule>  tamsulosin 0.4 milliGRAM(s) Oral at bedtime  trimethoprim   80 mG/sulfamethoxazole 400 mG 1 Tablet(s) Oral daily    MEDICATIONS  (PRN):      CAPILLARY BLOOD GLUCOSE      POCT Blood Glucose.: 124 mg/dL (09 Jun 2021 08:53)  POCT Blood Glucose.: 127 mg/dL (08 Jun 2021 21:33)  POCT Blood Glucose.: 131 mg/dL (08 Jun 2021 17:30)  POCT Blood Glucose.: 176 mg/dL (08 Jun 2021 12:59)    I&O's Summary    08 Jun 2021 07:01  -  09 Jun 2021 07:00  --------------------------------------------------------  IN: 1480 mL / OUT: 1150 mL / NET: 330 mL        PHYSICAL EXAM:  Vital Signs Last 24 Hrs  T(C): 36.3 (09 Jun 2021 12:45), Max: 36.7 (08 Jun 2021 20:07)  T(F): 97.4 (09 Jun 2021 12:45), Max: 98 (08 Jun 2021 20:07)  HR: 71 (09 Jun 2021 12:45) (65 - 76)  BP: 123/69 (09 Jun 2021 12:45) (122/66 - 143/62)  BP(mean): --  RR: 18 (09 Jun 2021 12:45) (18 - 18)  SpO2: 100% (09 Jun 2021 12:45) (98% - 100%)    PHYSICAL EXAM:        LABS:    06-09    136  |  106  |  30<H>  ----------------------------<  174<H>  4.9   |  15<L>  |  2.77<H>    Ca    10.1      09 Jun 2021 10:35    TPro  7.1  /  Alb  3.7  /  TBili  0.2  /  DBili  x   /  AST  45<H>  /  ALT  42  /  AlkPhos  69  06-09                RADIOLOGY & ADDITIONAL TESTS:    Imaging Personally Reviewed:    Electrocardiogram Personally Reviewed:    COORDINATION OF CARE:  Care Discussed with Consultants/Other Providers [Y/N]:  Prior or Outpatient Records Reviewed [Y/N]:     Patient is a 70y old  Male who presents with a chief complaint of Sent in for Hyperkalemia and worsening renal function (09 Jun 2021 09:15)      SUBJECTIVE / OVERNIGHT EVENTS:   No fever , no abd pain ,no chest pain .  patient states to feel well and is ready to go home.        ADDITIONAL REVIEW OF SYSTEMS: Negative except for above    MEDICATIONS  (STANDING):  allopurinol 100 milliGRAM(s) Oral daily  citric acid/sodium citrate Solution 30 milliLiter(s) Oral two times a day  dextrose 50% Injectable 25 Gram(s) IV Push once  dextrose 50% Injectable 25 Gram(s) IV Push once  heparin   Injectable 5000 Unit(s) SubCutaneous every 8 hours  insulin lispro (ADMELOG) corrective regimen sliding scale   SubCutaneous at bedtime  insulin lispro (ADMELOG) corrective regimen sliding scale   SubCutaneous three times a day before meals  magnesium oxide 400 milliGRAM(s) Oral daily  metoprolol succinate  milliGRAM(s) Oral daily  mycophenolate mofetil 250 milliGRAM(s) Oral two times a day  NIFEdipine XL 60 milliGRAM(s) Oral daily  predniSONE   Tablet 5 milliGRAM(s) Oral daily  Relugolix (Orgovyx) tablets 120 milliGRAM(s) 120 milliGRAM(s) Oral daily  tacrolimus ER Tablet (ENVARSUS XR) 7 milliGRAM(s) Oral <User Schedule>  tamsulosin 0.4 milliGRAM(s) Oral at bedtime  trimethoprim   80 mG/sulfamethoxazole 400 mG 1 Tablet(s) Oral daily    MEDICATIONS  (PRN):      CAPILLARY BLOOD GLUCOSE      POCT Blood Glucose.: 124 mg/dL (09 Jun 2021 08:53)  POCT Blood Glucose.: 127 mg/dL (08 Jun 2021 21:33)  POCT Blood Glucose.: 131 mg/dL (08 Jun 2021 17:30)  POCT Blood Glucose.: 176 mg/dL (08 Jun 2021 12:59)    I&O's Summary    08 Jun 2021 07:01  -  09 Jun 2021 07:00  --------------------------------------------------------  IN: 1480 mL / OUT: 1150 mL / NET: 330 mL        PHYSICAL EXAM:  Vital Signs Last 24 Hrs  T(C): 36.3 (09 Jun 2021 12:45), Max: 36.7 (08 Jun 2021 20:07)  T(F): 97.4 (09 Jun 2021 12:45), Max: 98 (08 Jun 2021 20:07)  HR: 71 (09 Jun 2021 12:45) (65 - 76)  BP: 123/69 (09 Jun 2021 12:45) (122/66 - 143/62)  BP(mean): --  RR: 18 (09 Jun 2021 12:45) (18 - 18)  SpO2: 100% (09 Jun 2021 12:45) (98% - 100%)    PHYSICAL EXAM:  GENERAL: NAD, well-developed  HEAD:  Atraumatic, Normocephalic  EYES:  conjunctiva and sclera clear  NECK: Supple, No JVD  CHEST/LUNG: Clear to auscultation bilaterally; No wheeze  HEART: Regular rate and rhythm; No murmurs, rubs, or gallops  ABDOMEN: Soft, Nontender, Nondistended; Bowel sounds present  EXTREMITIES:  2+ Peripheral Pulses, No clubbing, cyanosis, or edema  NEUROLOGY: non-focal, AAOx3  IBARRA in place      LABS:    06-09    136  |  106  |  30<H>  ----------------------------<  174<H>  4.9   |  15<L>  |  2.77<H>    Ca    10.1      09 Jun 2021 10:35    TPro  7.1  /  Alb  3.7  /  TBili  0.2  /  DBili  x   /  AST  45<H>  /  ALT  42  /  AlkPhos  69  06-09                RADIOLOGY & ADDITIONAL TESTS:    Imaging Personally Reviewed:    Electrocardiogram Personally Reviewed:    COORDINATION OF CARE:  Care Discussed with Consultants/Other Providers [Y/N]:  Prior or Outpatient Records Reviewed [Y/N]:

## 2021-06-09 NOTE — DISCHARGE NOTE PROVIDER - PROVIDER TOKENS
PROVIDER:[TOKEN:[[id number]],FOLLOWUP:[1 week]],PROVIDER:[TOKEN:[[id number]],FOLLOWUP:[1 week]]

## 2021-06-09 NOTE — PROGRESS NOTE ADULT - PROBLEM SELECTOR PLAN 2
S/p lokelma  resolved  renal diet.
-Nuno CALLAHAN today  -Keep on tele until K stops rising and Cr improves
S/p lokelma  resolved  renal diet.
S/p lokelma  resolved  renal diet.
-Angelama TID can DC when K improves  -Keep on tele until K stops rising and Cr improves and Dc tele this afternoon if Cr and K improving
S/p lokelma  resolved  renal diet.

## 2021-06-09 NOTE — PROGRESS NOTE ADULT - PROBLEM SELECTOR PLAN 1
Prerenal in setting of complicated uti. Also post renal. Imaging noted.  -Transplant Nephrology consulted and will continue with Envarsus, Prednisone ; and Cellcept which was decreased  -urine cx showing zosyn sensitive pseudomonas again---> CW ZOsyn ( 6/2-6/4;  6/4 -->6/8)  -Uro input appreciated --> defer to t/p surg for stent mgmt.  I have discussed the case with the renal transplant fellow who recommended for patient to follow up with his transplant physician and his urologist as outpatient about the next step in the management of the ureteral stent.  Pt states that he has an apt on 6/10 with his renal transplant physician    -Follow up with Transplant ID & Nephrology, urology once dc   -Cr improving today  - Monitor HCO3   - DC time 45mns

## 2021-06-09 NOTE — DISCHARGE NOTE PROVIDER - CARE PROVIDERS DIRECT ADDRESSES
[email or web address]

## 2021-06-09 NOTE — PROGRESS NOTE ADULT - PROBLEM SELECTOR PLAN 6
Orgovyx is non-formulary and would need to supply his own  Pending outpatient follow up for radiation  -Uro input appreciated --> defer to t/p surg for stent mgmt--> outpatient follow up with urologist and renal transplant specialist

## 2021-06-09 NOTE — DISCHARGE NOTE NURSING/CASE MANAGEMENT/SOCIAL WORK - NSSCTYPOFSERV_GEN_ALL_CORE
home care agency  for assessment. evaluation and teaching . they should contact in 1days after pending acceptance.  home care agency  for assessment. evaluation and teaching . agency should contact you a day after d/c

## 2021-06-09 NOTE — PROGRESS NOTE ADULT - PROBLEM SELECTOR PLAN 8
-Continue with Nifedipine    #Diarrhea/ fluctuating as per pt ( chronic)       Plan d/w med np and the renal transplant fellow

## 2021-06-10 ENCOUNTER — APPOINTMENT (OUTPATIENT)
Dept: NEPHROLOGY | Facility: CLINIC | Age: 71
End: 2021-06-10
Payer: MEDICARE

## 2021-06-10 VITALS
OXYGEN SATURATION: 97 % | SYSTOLIC BLOOD PRESSURE: 100 MMHG | TEMPERATURE: 97.4 F | DIASTOLIC BLOOD PRESSURE: 56 MMHG | HEIGHT: 71 IN | BODY MASS INDEX: 28.56 KG/M2 | WEIGHT: 204 LBS | RESPIRATION RATE: 16 BRPM

## 2021-06-10 PROCEDURE — 99214 OFFICE O/P EST MOD 30 MIN: CPT

## 2021-06-10 RX ORDER — AMOXICILLIN AND CLAVULANATE POTASSIUM 875; 125 MG/1; MG/1
875-125 TABLET, COATED ORAL
Qty: 3 | Refills: 0 | Status: DISCONTINUED | COMMUNITY
Start: 2021-06-09 | End: 2021-06-10

## 2021-06-10 RX ORDER — GABAPENTIN 300 MG/1
300 CAPSULE ORAL
Qty: 90 | Refills: 1 | Status: DISCONTINUED | COMMUNITY
Start: 2018-07-20 | End: 2021-06-10

## 2021-06-10 RX ORDER — MYCOPHENOLATE MOFETIL 500 MG/1
500 TABLET ORAL
Qty: 60 | Refills: 11 | Status: DISCONTINUED | COMMUNITY
Start: 2019-08-15 | End: 2021-06-10

## 2021-06-10 RX ORDER — CIPROFLOXACIN HYDROCHLORIDE 500 MG/1
500 TABLET, FILM COATED ORAL DAILY
Qty: 14 | Refills: 0 | Status: DISCONTINUED | COMMUNITY
Start: 2021-05-14 | End: 2021-06-10

## 2021-06-10 NOTE — HISTORY OF PRESENT ILLNESS
[de-identified] : Tube exchanged October 29th.  Pt diagnosed with Covid, wife was diagnosed with Covid on Jan 28th 2021.  On 29th pt was tested in an urgent care, tested positive and went to [place].  Had no symptoms.  Received the monoclonal antibody on the 29th.  Creatinine in 3's in [facility] and he was admitted and received 5 days of IV antibiotics for urine infection.\par \par Pt admitted June 2nd 2021 for CHELA and hyperkalemia.  Found to have UTI and treated.  Had pseudomonas in urine and received zosyn.  CHELA improved before discharge.  Creatinine decreased to 2.7 before discharge.  Stent internalized in April 2021.  Will see Dr. Phillip for stent exchange. Feels tired today.  [TextBox_42] : Mr. Medrano is a 70 y.o male with a hsitory of ESRD since 2015 after bilateral nephrectomies for RCC s/p DDRT 2018 who presents for transplant relisting.  Of note his current eGFR is about 22.  He follows with Dr. Bonilla.\par He has known DM  since age 41, HTN.\par Has b/l nephrectomies for RCC,  left on 2015 and right 2015.\par H/o intestinal blockage and was hospitalized at [facility] in 2017. Had surgical correction. Has left UE AVF.\par \par s/p  donor kidney transplant on 18.\par HCV donor. 40 yrs old male. cold ischemia time 23 hrs. Had delayed graft function but kidney eventually worked with best creatinine 1.9. \par \par PT had several admissions post transplant.  Early on he was readmitted for anemia and hematoma. Required 2 kidney biopsies which revealed few oxalate crystals and borderline rejection for which he was treated with solumedrol 375 and prednisone taper.  He is on bicitra to reduce risk of oxalate stones.  Also admitted for CHELA few times, UTI's and CMV. \par Pt admitted for CHELA and hydronephrosis 2020. Had nephrostomy tube then NU tube placed. then admitted for UTI. Went for ureteral stricture dilation and replacement of NU tube on 8/3/20.\par \par Pt was treated for UTI in 2020.  Pt had NU tube check in September, was not changed. Going to have it rechecked/ removed tomorrow.  Creatinine now about 3.  \par \par Social: Non smoker, no alcohol or drug use.  Currently working, . \par \par Able to walk 5-6 blocks, can climb stairs.\par ROS: Has no shortness of breath on exertion. \par Fam: Parents are . Father had DM, Mother had breast cancer.\par Has no children; Wife, healthy, 61 years. 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

## 2021-06-10 NOTE — ASSESSMENT
[FreeTextEntry1] : 1. CKD of kidney transplantation - Pts aaron creatinine 1.9 but had CHELA with pyelonephritis. Has had multiple episodes of CHELA.  Pt in process of relisting but on hold due to prostate cancer.  Cell free DNA 0.2% in October end.  Last creatinine 2.7 as inpatient yesterday.  Pt now with internal stent, will see Dr. Phillip later this month.  Will need stent exchanges.  \par 2. Immunosuppression - simulect induction, tacrolimus target 5-7, Envarsus 5 mgs, MMF 500mgs daily at discharge due to recurrent UTI.  Will change to 250mgs BID.  BID.  \par 3. DM2 - on januvia and glimepiride. BG controlled at home.  A1c at goal. \par 4. HTN - controlled\par 5. HCV - genotype 3a. completed Epclusa. Last vl note detected. \par 6. Anemia - due to CKD/CHELA.  last Hb at goal. \par 7. Chronic diarrhea - on and off on lomotil prn. Has seen GI and had a colonoscopy in 2019. Has history of small bowel resection which may be the cause of diarrhea. 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 \par 9.  UTI - recurrent likely related to NU tube/ JJ stent.  Appears to be colonized with pseudomonas - recently treated with zosyn.   \par 10.  COVID19 - asymptomatic.  s/p monoclonal antibody.  Resolved.  Received monoclonal antibody and recently vaccinated.  \par 11.  Prostate cancer - Pt on androgen depletion and starting RT in near future.  \par \par f/u in 2-3 months. \par

## 2021-06-11 ENCOUNTER — NON-APPOINTMENT (OUTPATIENT)
Age: 71
End: 2021-06-11

## 2021-06-11 LAB — CMV DNA CSF QL NAA+PROBE: SIGNIFICANT CHANGE UP

## 2021-06-16 ENCOUNTER — APPOINTMENT (OUTPATIENT)
Dept: UROLOGY | Facility: CLINIC | Age: 71
End: 2021-06-16
Payer: MEDICARE

## 2021-06-16 VITALS — TEMPERATURE: 98 F | HEART RATE: 87 BPM | DIASTOLIC BLOOD PRESSURE: 66 MMHG | SYSTOLIC BLOOD PRESSURE: 128 MMHG

## 2021-06-16 PROCEDURE — 99213 OFFICE O/P EST LOW 20 MIN: CPT

## 2021-06-16 NOTE — ASSESSMENT
[FreeTextEntry1] : Bladder instilled with sterile water, Velez removed without difficulty. Patient voided without difficulty PVR 69 ml. Patient then advised to stay in office and urinate again. PVR 41 ml after second void. \par \par Increased tamsulosin 0.4 mg to 2 tablets. \par \par Will return to see  after he completes SBRT. \par \par

## 2021-06-16 NOTE — PHYSICAL EXAM
[General Appearance - Well Developed] : well developed [Normal Appearance] : normal appearance [General Appearance - In No Acute Distress] : no acute distress [Abdomen Soft] : soft [Abdomen Tenderness] : non-tender [Costovertebral Angle Tenderness] : no ~M costovertebral angle tenderness [Urethral Meatus] : meatus normal [Urinary Bladder Findings] : the bladder was normal on palpation [Scrotum] : the scrotum was normal [Testes Mass (___cm)] : there were no testicular masses [No Prostate Nodules] : no prostate nodules [Skin Color & Pigmentation] : normal skin color and pigmentation [Edema] : no peripheral edema [] : no respiratory distress [Respiration, Rhythm And Depth] : normal respiratory rhythm and effort [Exaggerated Use Of Accessory Muscles For Inspiration] : no accessory muscle use [Oriented To Time, Place, And Person] : oriented to person, place, and time [Not Anxious] : not anxious [Normal Station and Gait] : the gait and station were normal for the patient's age [No Focal Deficits] : no focal deficits [No Palpable Adenopathy] : no palpable adenopathy [FreeTextEntry1] : scars on mid abdomen

## 2021-06-16 NOTE — HISTORY OF PRESENT ILLNESS
[FreeTextEntry1] : Patient is a 70 year old man that presents to the office today for trial of void. \par S/P bilateral nephrectomies 2015, History of ESRD, S/P renal transplant July 2018. Ureteral stent placed in transplant kidney at [facility] April 2021. \par He has a recent diagnosis of Mauricio 7 (4+3) prostate cancer, started Orgovyx. He is also  preparing to undergo SBRT. \par \par He was admitted to the hospital with CHELA and a urinary tract infection on June 2nd. Found to be in urinary retention. Indwelling Velez placed while in the hospital. On tamsulosin 0.4 mg

## 2021-07-01 PROCEDURE — 77338 DESIGN MLC DEVICE FOR IMRT: CPT | Mod: 26

## 2021-07-01 PROCEDURE — 77301 RADIOTHERAPY DOSE PLAN IMRT: CPT | Mod: 26

## 2021-07-01 PROCEDURE — 77300 RADIATION THERAPY DOSE PLAN: CPT | Mod: 26

## 2021-07-09 PROCEDURE — 77427 RADIATION TX MANAGEMENT X5: CPT

## 2021-07-09 PROCEDURE — 77387B: CUSTOM | Mod: 26

## 2021-07-12 PROCEDURE — 77387B: CUSTOM | Mod: 26

## 2021-07-13 PROCEDURE — 77387B: CUSTOM | Mod: 26

## 2021-07-14 ENCOUNTER — NON-APPOINTMENT (OUTPATIENT)
Age: 71
End: 2021-07-14

## 2021-07-14 VITALS
TEMPERATURE: 96.6 F | OXYGEN SATURATION: 99 % | HEART RATE: 65 BPM | SYSTOLIC BLOOD PRESSURE: 126 MMHG | WEIGHT: 205.8 LBS | BODY MASS INDEX: 28.7 KG/M2 | DIASTOLIC BLOOD PRESSURE: 64 MMHG | RESPIRATION RATE: 16 BRPM

## 2021-07-14 PROCEDURE — 77387B: CUSTOM | Mod: 26

## 2021-07-15 PROCEDURE — 77014: CPT | Mod: 26

## 2021-07-16 PROCEDURE — 77427 RADIATION TX MANAGEMENT X5: CPT

## 2021-07-16 PROCEDURE — 77387B: CUSTOM | Mod: 26

## 2021-07-19 PROCEDURE — 77387B: CUSTOM | Mod: 26

## 2021-07-20 PROCEDURE — 77387B: CUSTOM | Mod: 26

## 2021-07-21 ENCOUNTER — NON-APPOINTMENT (OUTPATIENT)
Age: 71
End: 2021-07-21

## 2021-07-21 PROCEDURE — 77387B: CUSTOM | Mod: 26

## 2021-07-21 NOTE — VITALS
Negative [Maximal Pain Intensity: 0/10] : 0/10 [Least Pain Intensity: 0/10] : 0/10 [80: Normal activity with effort; some signs or symptoms of disease.] : 80: Normal activity with effort; some signs or symptoms of disease.  [ECOG Performance Status: 1 - Restricted in physically strenuous activity but ambulatory and able to carry out work of a light or sedentary nature] : Performance Status: 1 - Restricted in physically strenuous activity but ambulatory and able to carry out work of a light or sedentary nature, e.g., light house work, office work

## 2021-07-22 ENCOUNTER — APPOINTMENT (OUTPATIENT)
Dept: TRANSPLANT | Facility: CLINIC | Age: 71
End: 2021-07-22

## 2021-07-22 ENCOUNTER — NON-APPOINTMENT (OUTPATIENT)
Age: 71
End: 2021-07-22

## 2021-07-22 PROCEDURE — 77014: CPT | Mod: 26

## 2021-07-23 LAB
ALBUMIN SERPL ELPH-MCNC: 3.9 G/DL
ALP BLD-CCNC: 68 U/L
ALT SERPL-CCNC: 15 U/L
ANION GAP SERPL CALC-SCNC: 11 MMOL/L
APPEARANCE: ABNORMAL
AST SERPL-CCNC: 20 U/L
BACTERIA: ABNORMAL
BASOPHILS # BLD AUTO: 0.02 K/UL
BASOPHILS NFR BLD AUTO: 0.2 %
BILIRUB SERPL-MCNC: 0.5 MG/DL
BILIRUBIN URINE: NEGATIVE
BLOOD URINE: ABNORMAL
BUN SERPL-MCNC: 39 MG/DL
CALCIUM SERPL-MCNC: 10 MG/DL
CHLORIDE SERPL-SCNC: 98 MMOL/L
CO2 SERPL-SCNC: 23 MMOL/L
COLOR: NORMAL
CREAT SERPL-MCNC: 3.45 MG/DL
CREAT SPEC-SCNC: 139 MG/DL
CREAT/PROT UR: 0.4 RATIO
EOSINOPHIL # BLD AUTO: 0.03 K/UL
EOSINOPHIL NFR BLD AUTO: 0.2 %
GLUCOSE QUALITATIVE U: NEGATIVE
GLUCOSE SERPL-MCNC: 241 MG/DL
HCT VFR BLD CALC: 32.4 %
HGB BLD-MCNC: 9.7 G/DL
HYALINE CASTS: 1 /LPF
IMM GRANULOCYTES NFR BLD AUTO: 0.7 %
KETONES URINE: NEGATIVE
LEUKOCYTE ESTERASE URINE: ABNORMAL
LYMPHOCYTES # BLD AUTO: 1.69 K/UL
LYMPHOCYTES NFR BLD AUTO: 13.1 %
MAGNESIUM SERPL-MCNC: 1.7 MG/DL
MAN DIFF?: NORMAL
MCHC RBC-ENTMCNC: 28.7 PG
MCHC RBC-ENTMCNC: 29.9 GM/DL
MCV RBC AUTO: 95.9 FL
MICROSCOPIC-UA: NORMAL
MONOCYTES # BLD AUTO: 0.79 K/UL
MONOCYTES NFR BLD AUTO: 6.1 %
NEUTROPHILS # BLD AUTO: 10.32 K/UL
NEUTROPHILS NFR BLD AUTO: 79.7 %
NITRITE URINE: POSITIVE
PH URINE: 6
PHOSPHATE SERPL-MCNC: 2.8 MG/DL
PLATELET # BLD AUTO: 130 K/UL
POTASSIUM SERPL-SCNC: 5.4 MMOL/L
PROT SERPL-MCNC: 6.5 G/DL
PROT UR-MCNC: 50 MG/DL
PROTEIN URINE: ABNORMAL
RBC # BLD: 3.38 M/UL
RBC # FLD: 15.5 %
RED BLOOD CELLS URINE: 10 /HPF
SODIUM SERPL-SCNC: 132 MMOL/L
SPECIFIC GRAVITY URINE: 1.01
SQUAMOUS EPITHELIAL CELLS: 1 /HPF
UROBILINOGEN URINE: NORMAL
WBC # FLD AUTO: 12.94 K/UL
WHITE BLOOD CELLS URINE: 98 /HPF

## 2021-07-23 PROCEDURE — 77427 RADIATION TX MANAGEMENT X5: CPT

## 2021-07-23 PROCEDURE — 77387B: CUSTOM | Mod: 26

## 2021-07-26 PROCEDURE — 77387B: CUSTOM | Mod: 26

## 2021-07-27 ENCOUNTER — NON-APPOINTMENT (OUTPATIENT)
Age: 71
End: 2021-07-27

## 2021-07-27 ENCOUNTER — APPOINTMENT (OUTPATIENT)
Dept: NEPHROLOGY | Facility: CLINIC | Age: 71
End: 2021-07-27
Payer: MEDICARE

## 2021-07-27 VITALS
RESPIRATION RATE: 16 BRPM | OXYGEN SATURATION: 97 % | HEIGHT: 71 IN | TEMPERATURE: 97.6 F | BODY MASS INDEX: 29.4 KG/M2 | HEART RATE: 89 BPM | SYSTOLIC BLOOD PRESSURE: 174 MMHG | DIASTOLIC BLOOD PRESSURE: 92 MMHG | WEIGHT: 210 LBS

## 2021-07-27 LAB
BACTERIA UR CULT: ABNORMAL
BKV DNA SPEC QL NAA+PROBE: NEGATIVE COPIES/ML
CMV DNA SPEC QL NAA+PROBE: NOT DETECTED IU/ML
LOG 10 BK QUANTITATION PCR: NORMAL

## 2021-07-27 PROCEDURE — 99214 OFFICE O/P EST MOD 30 MIN: CPT

## 2021-07-27 PROCEDURE — 77387B: CUSTOM | Mod: 26

## 2021-07-27 NOTE — HISTORY OF PRESENT ILLNESS
[FreeTextEntry1] : 71 y/o male with h/o kidney transplant (bilateral nephrectomy) in 2018, HTN, DM2, newly diagnosed prostate cancer presents to discuss radiation treatment for prostate cancer. \par \par Pathology in 3/2021 showed [place] 4+3 in 2 cores with 80% of tissue involvement, Mauricio 3+4 in 4 cores with 60% involvement, [place] 3+3 in 2 cores with 10% involvement. Total 14 biopsy cores, with 7 cores positive with cancer.  PSA 5.27 in 12/2020, PSA 4.29 in 10/2020, PSA 3.41 in 5/2018\par \par 7/27/21: 13/28 fx Tolerating RT, urinary frequency increased since starting radiation, up every hour over night. Taking Flomax. No bowel deterioration. Mild fatigue.

## 2021-07-27 NOTE — HISTORY OF PRESENT ILLNESS
[de-identified] : Tube exchanged October 29th.  Pt diagnosed with Covid, wife was diagnosed with Covid on Jan 28th 2021.  On 29th pt was tested in an urgent care, tested positive and went to [place].  Had no symptoms.  Received the monoclonal antibody on the 29th.  Creatinine in 3's in [facility] and he was admitted and received 5 days of IV antibiotics for urine infection.\par \par Pt admitted June 2nd 2021 for CHELA and hyperkalemia.  Found to have UTI and treated.  Had pseudomonas in urine and received zosyn.  CHELA improved before discharge.  Creatinine decreased to 2.7 before discharge.  Stent internalized\par \par 5 days ago started with urinary frequency and dyuria.  Started cipro and urine culture was sensitive to cipro.  Pts symptoms have not improved.  No fevers or chills.  WBC and creatinine were increased on 7/22.  Was told to increase flomax by urology last visit but never increased.  Receiving RT therapy for prostate.  [TextBox_42] : Mr. Medrano is a 70 y.o male with a hsitory of ESRD since 2015 after bilateral nephrectomies for RCC s/p DDRT 2018 who presents for transplant relisting.  Of note his current eGFR is about 22.  He follows with Dr. Bonilla.\par He has known DM  since age 41, HTN.\par Has b/l nephrectomies for RCC,  left on 2015 and right 2015.\par H/o intestinal blockage and was hospitalized at [facility] in 2017. Had surgical correction. Has left UE AVF.\par \par s/p  donor kidney transplant on 18.\par HCV donor. 40 yrs old male. cold ischemia time 23 hrs. Had delayed graft function but kidney eventually worked with best creatinine 1.9. \par \par PT had several admissions post transplant.  Early on he was readmitted for anemia and hematoma. Required 2 kidney biopsies which revealed few oxalate crystals and borderline rejection for which he was treated with solumedrol 375 and prednisone taper.  He is on bicitra to reduce risk of oxalate stones.  Also admitted for CHELA few times, UTI's and CMV. \par Pt admitted for CHELA and hydronephrosis 2020. Had nephrostomy tube then NU tube placed. then admitted for UTI. Went for ureteral stricture dilation and replacement of NU tube on 8/3/20.\par \par Pt was treated for UTI in 2020.  Pt had NU tube check in September, was not changed. Going to have it rechecked/ removed tomorrow.  Creatinine now about 3.  \par \par Social: Non smoker, no alcohol or drug use.  Currently working, . \par \par Able to walk 5-6 blocks, can climb stairs.\par ROS: Has no shortness of breath on exertion. \par Fam: Parents are . Father had DM, Mother had breast cancer.\par Has no children; Wife, healthy, 61 years. 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

## 2021-07-27 NOTE — DISEASE MANAGEMENT
[Clinical] : TNM Stage: c [II] : II [TTNM] : 1c [NTNM] : 0 [MTNM] : 0 [de-identified] : 7000 cGy  [de-identified] : prostate

## 2021-07-27 NOTE — REVIEW OF SYSTEMS
[Urinary Tract Pain: Grade 0] : Urinary Tract Pain: Grade 0 [Diarrhea: Grade 1 - Increase of <4 stools per day over baseline; mild increase in ostomy output compared to baseline] : Diarrhea: Grade 1 - Increase of <4 stools per day over baseline; mild increase in ostomy output compared to baseline [Fatigue: Grade 1 - Fatigue relieved by rest] : Fatigue: Grade 1 - Fatigue relieved by rest

## 2021-07-27 NOTE — ASSESSMENT
[FreeTextEntry1] : 1. CKD of kidney transplantation - Pts aaron creatinine 1.9 but had CHELA with pyelonephritis. Has had multiple episodes of CHELA.  Pt in process of relisting but on hold due to prostate cancer.  Cell free DNA 0.2% in October end.  Last creatinine 3.4.  Pt now with internal stent, will see Dr. Phillip later this month.  Will need stent exchanges.  \par 2. Immunosuppression - simulect induction, tacrolimus target 5-7, Envarsus 5 mgs, MMF 250mgs BID.  \par 3. DM2 - on januvia and glimepiride. BG controlled at home.  A1c at goal. \par 4. HTN - controlled\par 5. HCV - genotype 3a. completed Epclusa. Last vl note detected. \par 6. Anemia - due to CKD/CHELA.  last Hb at goal. \par 7. Chronic diarrhea - on and off on lomotil prn. Has seen GI and had a colonoscopy in 2019. Has history of small bowel resection which may be the cause of diarrhea. 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 \par 9.  UTI - repeat urine culture today and continue cipro.  Pt may be having KAUFMAN - increase flomax to 0.8\par 10.  COVID19 -  Resolved.  Received monoclonal antibody and recently vaccinated.  \par 11.  Prostate cancer - Pt on androgen depletion and on RT\par \par f/u in 2 months. \par

## 2021-07-28 ENCOUNTER — NON-APPOINTMENT (OUTPATIENT)
Age: 71
End: 2021-07-28

## 2021-07-28 LAB
ALBUMIN SERPL ELPH-MCNC: 4.2 G/DL
ALP BLD-CCNC: 78 U/L
ALT SERPL-CCNC: 19 U/L
ANION GAP SERPL CALC-SCNC: 13 MMOL/L
APPEARANCE: CLEAR
AST SERPL-CCNC: 21 U/L
BACTERIA: ABNORMAL
BASOPHILS # BLD AUTO: 0.03 K/UL
BASOPHILS NFR BLD AUTO: 0.3 %
BILIRUB SERPL-MCNC: 0.3 MG/DL
BILIRUBIN URINE: NEGATIVE
BLOOD URINE: ABNORMAL
BUN SERPL-MCNC: 46 MG/DL
CALCIUM SERPL-MCNC: 10.3 MG/DL
CHLORIDE SERPL-SCNC: 100 MMOL/L
CO2 SERPL-SCNC: 21 MMOL/L
COLOR: NORMAL
CREAT SERPL-MCNC: 4.13 MG/DL
CREAT SPEC-SCNC: 137 MG/DL
CREAT/PROT UR: 0.4 RATIO
EOSINOPHIL # BLD AUTO: 0.05 K/UL
EOSINOPHIL NFR BLD AUTO: 0.5 %
GLUCOSE QUALITATIVE U: NEGATIVE
GLUCOSE SERPL-MCNC: 156 MG/DL
HCT VFR BLD CALC: 30.2 %
HGB BLD-MCNC: 9.1 G/DL
HYALINE CASTS: 0 /LPF
IMM GRANULOCYTES NFR BLD AUTO: 1 %
KETONES URINE: NEGATIVE
LEUKOCYTE ESTERASE URINE: ABNORMAL
LYMPHOCYTES # BLD AUTO: 1.93 K/UL
LYMPHOCYTES NFR BLD AUTO: 19.3 %
MAGNESIUM SERPL-MCNC: 2 MG/DL
MAN DIFF?: NORMAL
MCHC RBC-ENTMCNC: 29.4 PG
MCHC RBC-ENTMCNC: 30.1 GM/DL
MCV RBC AUTO: 97.7 FL
MICROSCOPIC-UA: NORMAL
MONOCYTES # BLD AUTO: 0.8 K/UL
MONOCYTES NFR BLD AUTO: 8 %
NEUTROPHILS # BLD AUTO: 7.08 K/UL
NEUTROPHILS NFR BLD AUTO: 70.9 %
NITRITE URINE: POSITIVE
PH URINE: 6
PHOSPHATE SERPL-MCNC: 3.5 MG/DL
PLATELET # BLD AUTO: 193 K/UL
POTASSIUM SERPL-SCNC: 5.6 MMOL/L
PROT SERPL-MCNC: 6.7 G/DL
PROT UR-MCNC: 52 MG/DL
PROTEIN URINE: ABNORMAL
RBC # BLD: 3.09 M/UL
RBC # FLD: 15.5 %
RED BLOOD CELLS URINE: 4 /HPF
SODIUM SERPL-SCNC: 133 MMOL/L
SPECIFIC GRAVITY URINE: 1.02
SQUAMOUS EPITHELIAL CELLS: 2 /HPF
TACROLIMUS SERPL-MCNC: 5.3 NG/ML
URATE SERPL-MCNC: 9.4 MG/DL
UROBILINOGEN URINE: NORMAL
WBC # FLD AUTO: 9.99 K/UL
WHITE BLOOD CELLS URINE: 250 /HPF

## 2021-07-28 PROCEDURE — 77387B: CUSTOM | Mod: 26

## 2021-07-29 ENCOUNTER — OUTPATIENT (OUTPATIENT)
Dept: OUTPATIENT SERVICES | Facility: HOSPITAL | Age: 71
LOS: 1 days | End: 2021-07-29
Payer: MEDICARE

## 2021-07-29 ENCOUNTER — RESULT REVIEW (OUTPATIENT)
Age: 71
End: 2021-07-29

## 2021-07-29 ENCOUNTER — APPOINTMENT (OUTPATIENT)
Dept: ULTRASOUND IMAGING | Facility: HOSPITAL | Age: 71
End: 2021-07-29

## 2021-07-29 DIAGNOSIS — Z90.5 ACQUIRED ABSENCE OF KIDNEY: Chronic | ICD-10-CM

## 2021-07-29 DIAGNOSIS — I77.0 ARTERIOVENOUS FISTULA, ACQUIRED: Chronic | ICD-10-CM

## 2021-07-29 DIAGNOSIS — Z98.890 OTHER SPECIFIED POSTPROCEDURAL STATES: Chronic | ICD-10-CM

## 2021-07-29 DIAGNOSIS — Z94.0 KIDNEY TRANSPLANT STATUS: ICD-10-CM

## 2021-07-29 DIAGNOSIS — Z94.0 KIDNEY TRANSPLANT STATUS: Chronic | ICD-10-CM

## 2021-07-29 PROCEDURE — 76857 US EXAM PELVIC LIMITED: CPT | Mod: 26

## 2021-07-29 PROCEDURE — 77014: CPT | Mod: 26

## 2021-07-29 PROCEDURE — 76776 US EXAM K TRANSPL W/DOPPLER: CPT | Mod: 26,RT

## 2021-07-30 ENCOUNTER — NON-APPOINTMENT (OUTPATIENT)
Age: 71
End: 2021-07-30

## 2021-07-30 LAB — BACTERIA UR CULT: ABNORMAL

## 2021-07-30 PROCEDURE — 77387B: CUSTOM | Mod: 26

## 2021-07-30 NOTE — REVIEW OF SYSTEMS
[Anal Pain: Grade 0] : Anal Pain: Grade 0 [Diarrhea: Grade 0] : Diarrhea: Grade 0 [Nausea: Grade 0] : Nausea: Grade 0 [Rectal Pain: Grade 0] : Rectal Pain: Grade 0 [Hematuria: Grade 0] : Hematuria: Grade 0 [Urinary Incontinence: Grade 0] : Urinary Incontinence: Grade 0  [Urinary Retention: Grade 0] : Urinary Retention: Grade 0 [Urinary Tract Pain: Grade 0] : Urinary Tract Pain: Grade 0 [Urinary Urgency: Grade 0] : Urinary Urgency: Grade 0 [Urinary Frequency: Grade 0] : Urinary Frequency: Grade 0 [IPSS Score (0-40): ___] : IPSS score: [unfilled] [EPIC-CP Score (0-60): ___] : EPIC-CP score: [unfilled] [Negative] : Allergic/Immunologic [FreeTextEntry5] : HTN [FreeTextEntry8] : h/o kidney transplant 2018 [de-identified] : DM2

## 2021-07-30 NOTE — HISTORY OF PRESENT ILLNESS
[FreeTextEntry1] : 69 y/o male with h/o kidney transplant (bilateral nephrectomy) in 2018, HTN, DM2, newly diagnosed prostate cancer presents to discuss radiation treatment for prostate cancer. \par \par Pathology in 3/2021 showed Maur[place] 4+3 in 2 cores with 80% of tissue involvement, [place] 3+4 in 4 cores with 60% involvement, Mauricio 3+3 in 2 cores with 10% involvement. Total 14 biopsy cores, with 7 cores positive with cancer. \par \par PSA 5.27 in 12/2020, PSA 4.29 in 10/2020, PSA 3.41 in 5/2018\par \par MRI in 2/2021 showed PIRADS 3, prostate vol 32cc, capsule intact.\par  \par Pt was given Orgovyx for ADT from urologist in 4/2021\par \par 9/28 fractions of radiation completed. Feeling well overall. Denies fatigue. Denies hematuria, or dysuria. No GI complaints at this time. S/P SpaceOAR hydrogel prior to starting RT. h/o kidney disease and transplant. On flomax even before RT started.

## 2021-07-30 NOTE — DISEASE MANAGEMENT
[Clinical] : TNM Stage: c [II] : II [TTNM] : 1c [NTNM] : 0 [MTNM] : 0 [de-identified] : 7000 cGy  [de-identified] : prostate

## 2021-07-30 NOTE — REVIEW OF SYSTEMS
[IPSS Score (0-40): ___] : IPSS score: [unfilled] [EPIC-CP Score (0-60): ___] : EPIC-CP score: [unfilled] [Negative] : Allergic/Immunologic [FreeTextEntry5] : HTN [FreeTextEntry8] : h/o kidney transplant 2018 [de-identified] : DM2

## 2021-07-30 NOTE — DISEASE MANAGEMENT
[Clinical] : TNM Stage: c [II] : II [TTNM] : 1c [NTNM] : 0 [MTNM] : 0 [de-identified] : 7000 cGy  [de-identified] : prostate

## 2021-07-30 NOTE — HISTORY OF PRESENT ILLNESS
[FreeTextEntry1] : 69 y/o male with h/o kidney transplant (bilateral nephrectomy) in 2018, HTN, DM2, newly diagnosed prostate cancer presents to discuss radiation treatment for prostate cancer. \par \par 7/14/21 OTV - \par PAtient presents today for 1 st OTV. Feeling well overall. Denies fatigue. Denies hematuria, or dysuria. No GI complaints at this time. S/P SpaceOAR hydrogel prior to starting RT>

## 2021-08-01 ENCOUNTER — INPATIENT (INPATIENT)
Facility: HOSPITAL | Age: 71
LOS: 5 days | Discharge: HOME | DRG: 699 | End: 2021-08-07
Attending: HOSPITALIST | Admitting: HOSPITALIST
Payer: MEDICARE

## 2021-08-01 VITALS
DIASTOLIC BLOOD PRESSURE: 74 MMHG | HEART RATE: 80 BPM | WEIGHT: 207.9 LBS | HEIGHT: 70.5 IN | TEMPERATURE: 98 F | OXYGEN SATURATION: 96 % | RESPIRATION RATE: 18 BRPM | SYSTOLIC BLOOD PRESSURE: 132 MMHG

## 2021-08-01 DIAGNOSIS — N17.9 ACUTE KIDNEY FAILURE, UNSPECIFIED: ICD-10-CM

## 2021-08-01 DIAGNOSIS — N40.0 BENIGN PROSTATIC HYPERPLASIA WITHOUT LOWER URINARY TRACT SYMPTOMS: ICD-10-CM

## 2021-08-01 DIAGNOSIS — Z90.5 ACQUIRED ABSENCE OF KIDNEY: Chronic | ICD-10-CM

## 2021-08-01 DIAGNOSIS — I10 ESSENTIAL (PRIMARY) HYPERTENSION: ICD-10-CM

## 2021-08-01 DIAGNOSIS — N30.90 CYSTITIS, UNSPECIFIED WITHOUT HEMATURIA: ICD-10-CM

## 2021-08-01 DIAGNOSIS — I77.0 ARTERIOVENOUS FISTULA, ACQUIRED: Chronic | ICD-10-CM

## 2021-08-01 DIAGNOSIS — Z94.0 KIDNEY TRANSPLANT STATUS: Chronic | ICD-10-CM

## 2021-08-01 DIAGNOSIS — Z98.890 OTHER SPECIFIED POSTPROCEDURAL STATES: Chronic | ICD-10-CM

## 2021-08-01 DIAGNOSIS — Z94.89 OTHER TRANSPLANTED ORGAN AND TISSUE STATUS: ICD-10-CM

## 2021-08-01 DIAGNOSIS — R19.7 DIARRHEA, UNSPECIFIED: ICD-10-CM

## 2021-08-01 DIAGNOSIS — E11.9 TYPE 2 DIABETES MELLITUS WITHOUT COMPLICATIONS: ICD-10-CM

## 2021-08-01 LAB
ALBUMIN SERPL ELPH-MCNC: 4 G/DL — SIGNIFICANT CHANGE UP (ref 3.3–5)
ALP SERPL-CCNC: 81 U/L — SIGNIFICANT CHANGE UP (ref 40–120)
ALT FLD-CCNC: 22 U/L — SIGNIFICANT CHANGE UP (ref 10–45)
ANION GAP SERPL CALC-SCNC: 10 MMOL/L — SIGNIFICANT CHANGE UP (ref 5–17)
ANION GAP SERPL CALC-SCNC: 10 MMOL/L — SIGNIFICANT CHANGE UP (ref 5–17)
ANION GAP SERPL CALC-SCNC: 12 MMOL/L — SIGNIFICANT CHANGE UP (ref 5–17)
ANISOCYTOSIS BLD QL: SLIGHT — SIGNIFICANT CHANGE UP
APPEARANCE UR: ABNORMAL
AST SERPL-CCNC: 23 U/L — SIGNIFICANT CHANGE UP (ref 10–40)
BACTERIA # UR AUTO: NEGATIVE — SIGNIFICANT CHANGE UP
BASOPHILS # BLD AUTO: 0 K/UL — SIGNIFICANT CHANGE UP (ref 0–0.2)
BASOPHILS NFR BLD AUTO: 0 % — SIGNIFICANT CHANGE UP (ref 0–2)
BILIRUB SERPL-MCNC: 0.2 MG/DL — SIGNIFICANT CHANGE UP (ref 0.2–1.2)
BILIRUB UR-MCNC: NEGATIVE — SIGNIFICANT CHANGE UP
BUN SERPL-MCNC: 48 MG/DL — HIGH (ref 7–23)
BUN SERPL-MCNC: 50 MG/DL — HIGH (ref 7–23)
BUN SERPL-MCNC: 56 MG/DL — HIGH (ref 7–23)
CALCIUM SERPL-MCNC: 10 MG/DL — SIGNIFICANT CHANGE UP (ref 8.4–10.5)
CALCIUM SERPL-MCNC: 10.2 MG/DL — SIGNIFICANT CHANGE UP (ref 8.4–10.5)
CALCIUM SERPL-MCNC: 9.6 MG/DL — SIGNIFICANT CHANGE UP (ref 8.4–10.5)
CHLORIDE SERPL-SCNC: 100 MMOL/L — SIGNIFICANT CHANGE UP (ref 96–108)
CHLORIDE SERPL-SCNC: 105 MMOL/L — SIGNIFICANT CHANGE UP (ref 96–108)
CHLORIDE SERPL-SCNC: 105 MMOL/L — SIGNIFICANT CHANGE UP (ref 96–108)
CO2 SERPL-SCNC: 22 MMOL/L — SIGNIFICANT CHANGE UP (ref 22–31)
CO2 SERPL-SCNC: 23 MMOL/L — SIGNIFICANT CHANGE UP (ref 22–31)
CO2 SERPL-SCNC: 23 MMOL/L — SIGNIFICANT CHANGE UP (ref 22–31)
COLOR SPEC: YELLOW — SIGNIFICANT CHANGE UP
CREAT SERPL-MCNC: 4.63 MG/DL — HIGH (ref 0.5–1.3)
CREAT SERPL-MCNC: 4.65 MG/DL — HIGH (ref 0.5–1.3)
CREAT SERPL-MCNC: 5.34 MG/DL — HIGH (ref 0.5–1.3)
DACRYOCYTES BLD QL SMEAR: SLIGHT — SIGNIFICANT CHANGE UP
DIFF PNL FLD: ABNORMAL
ELLIPTOCYTES BLD QL SMEAR: SIGNIFICANT CHANGE UP
EOSINOPHIL # BLD AUTO: 0.16 K/UL — SIGNIFICANT CHANGE UP (ref 0–0.5)
EOSINOPHIL NFR BLD AUTO: 1.7 % — SIGNIFICANT CHANGE UP (ref 0–6)
EPI CELLS # UR: 1 /HPF — SIGNIFICANT CHANGE UP
GLUCOSE BLDC GLUCOMTR-MCNC: 102 MG/DL — HIGH (ref 70–99)
GLUCOSE BLDC GLUCOMTR-MCNC: 103 MG/DL — HIGH (ref 70–99)
GLUCOSE SERPL-MCNC: 105 MG/DL — HIGH (ref 70–99)
GLUCOSE SERPL-MCNC: 112 MG/DL — HIGH (ref 70–99)
GLUCOSE SERPL-MCNC: 116 MG/DL — HIGH (ref 70–99)
GLUCOSE UR QL: NEGATIVE — SIGNIFICANT CHANGE UP
HCT VFR BLD CALC: 30.3 % — LOW (ref 39–50)
HGB BLD-MCNC: 9.3 G/DL — LOW (ref 13–17)
HYALINE CASTS # UR AUTO: 0 /LPF — SIGNIFICANT CHANGE UP (ref 0–2)
KETONES UR-MCNC: NEGATIVE — SIGNIFICANT CHANGE UP
LEUKOCYTE ESTERASE UR-ACNC: ABNORMAL
LYMPHOCYTES # BLD AUTO: 0.8 K/UL — LOW (ref 1–3.3)
LYMPHOCYTES # BLD AUTO: 8.7 % — LOW (ref 13–44)
MAGNESIUM SERPL-MCNC: 2 MG/DL — SIGNIFICANT CHANGE UP (ref 1.6–2.6)
MANUAL SMEAR VERIFICATION: SIGNIFICANT CHANGE UP
MCHC RBC-ENTMCNC: 29.4 PG — SIGNIFICANT CHANGE UP (ref 27–34)
MCHC RBC-ENTMCNC: 30.7 GM/DL — LOW (ref 32–36)
MCV RBC AUTO: 95.9 FL — SIGNIFICANT CHANGE UP (ref 80–100)
METAMYELOCYTES # FLD: 0.9 % — HIGH (ref 0–0)
MICROCYTES BLD QL: SLIGHT — SIGNIFICANT CHANGE UP
MONOCYTES # BLD AUTO: 0.65 K/UL — SIGNIFICANT CHANGE UP (ref 0–0.9)
MONOCYTES NFR BLD AUTO: 7 % — SIGNIFICANT CHANGE UP (ref 2–14)
NEUTROPHILS # BLD AUTO: 7.55 K/UL — HIGH (ref 1.8–7.4)
NEUTROPHILS NFR BLD AUTO: 81.7 % — HIGH (ref 43–77)
NITRITE UR-MCNC: POSITIVE
PH UR: 6.5 — SIGNIFICANT CHANGE UP (ref 5–8)
PHOSPHATE SERPL-MCNC: 3.6 MG/DL — SIGNIFICANT CHANGE UP (ref 2.5–4.5)
PLAT MORPH BLD: NORMAL — SIGNIFICANT CHANGE UP
PLATELET # BLD AUTO: 222 K/UL — SIGNIFICANT CHANGE UP (ref 150–400)
POIKILOCYTOSIS BLD QL AUTO: SIGNIFICANT CHANGE UP
POLYCHROMASIA BLD QL SMEAR: SLIGHT — SIGNIFICANT CHANGE UP
POTASSIUM SERPL-MCNC: 4.8 MMOL/L — SIGNIFICANT CHANGE UP (ref 3.5–5.3)
POTASSIUM SERPL-MCNC: 4.9 MMOL/L — SIGNIFICANT CHANGE UP (ref 3.5–5.3)
POTASSIUM SERPL-MCNC: 5.4 MMOL/L — HIGH (ref 3.5–5.3)
POTASSIUM SERPL-SCNC: 4.8 MMOL/L — SIGNIFICANT CHANGE UP (ref 3.5–5.3)
POTASSIUM SERPL-SCNC: 4.9 MMOL/L — SIGNIFICANT CHANGE UP (ref 3.5–5.3)
POTASSIUM SERPL-SCNC: 5.4 MMOL/L — HIGH (ref 3.5–5.3)
PROT SERPL-MCNC: 7.5 G/DL — SIGNIFICANT CHANGE UP (ref 6–8.3)
PROT UR-MCNC: ABNORMAL
RBC # BLD: 3.16 M/UL — LOW (ref 4.2–5.8)
RBC # FLD: 14.8 % — HIGH (ref 10.3–14.5)
RBC BLD AUTO: ABNORMAL
RBC CASTS # UR COMP ASSIST: 8 /HPF — HIGH (ref 0–4)
SARS-COV-2 RNA SPEC QL NAA+PROBE: SIGNIFICANT CHANGE UP
SODIUM SERPL-SCNC: 135 MMOL/L — SIGNIFICANT CHANGE UP (ref 135–145)
SODIUM SERPL-SCNC: 137 MMOL/L — SIGNIFICANT CHANGE UP (ref 135–145)
SODIUM SERPL-SCNC: 138 MMOL/L — SIGNIFICANT CHANGE UP (ref 135–145)
SP GR SPEC: 1.01 — SIGNIFICANT CHANGE UP (ref 1.01–1.02)
UROBILINOGEN FLD QL: NEGATIVE — SIGNIFICANT CHANGE UP
WBC # BLD: 9.24 K/UL — SIGNIFICANT CHANGE UP (ref 3.8–10.5)
WBC # FLD AUTO: 9.24 K/UL — SIGNIFICANT CHANGE UP (ref 3.8–10.5)
WBC UR QL: 540 /HPF — HIGH (ref 0–5)

## 2021-08-01 PROCEDURE — 74018 RADEX ABDOMEN 1 VIEW: CPT | Mod: 26

## 2021-08-01 PROCEDURE — 76937 US GUIDE VASCULAR ACCESS: CPT | Mod: 26

## 2021-08-01 PROCEDURE — 36000 PLACE NEEDLE IN VEIN: CPT

## 2021-08-01 PROCEDURE — 99222 1ST HOSP IP/OBS MODERATE 55: CPT

## 2021-08-01 PROCEDURE — 99285 EMERGENCY DEPT VISIT HI MDM: CPT | Mod: 25,GC

## 2021-08-01 PROCEDURE — 99223 1ST HOSP IP/OBS HIGH 75: CPT

## 2021-08-01 PROCEDURE — 76776 US EXAM K TRANSPL W/DOPPLER: CPT | Mod: 26,RT

## 2021-08-01 RX ORDER — DEXTROSE 50 % IN WATER 50 %
12.5 SYRINGE (ML) INTRAVENOUS ONCE
Refills: 0 | Status: DISCONTINUED | OUTPATIENT
Start: 2021-08-01 | End: 2021-08-07

## 2021-08-01 RX ORDER — CITRIC ACID/SODIUM CITRATE 300-500 MG
15 SOLUTION, ORAL ORAL THREE TIMES A DAY
Refills: 0 | Status: DISCONTINUED | OUTPATIENT
Start: 2021-08-01 | End: 2021-08-07

## 2021-08-01 RX ORDER — INSULIN LISPRO 100/ML
VIAL (ML) SUBCUTANEOUS AT BEDTIME
Refills: 0 | Status: DISCONTINUED | OUTPATIENT
Start: 2021-08-01 | End: 2021-08-07

## 2021-08-01 RX ORDER — CEFEPIME 1 G/1
INJECTION, POWDER, FOR SOLUTION INTRAMUSCULAR; INTRAVENOUS
Refills: 0 | Status: DISCONTINUED | OUTPATIENT
Start: 2021-08-01 | End: 2021-08-07

## 2021-08-01 RX ORDER — MYCOPHENOLATE MOFETIL 250 MG/1
250 CAPSULE ORAL
Refills: 0 | Status: DISCONTINUED | OUTPATIENT
Start: 2021-08-01 | End: 2021-08-07

## 2021-08-01 RX ORDER — CEFTRIAXONE 500 MG/1
1000 INJECTION, POWDER, FOR SOLUTION INTRAMUSCULAR; INTRAVENOUS ONCE
Refills: 0 | Status: COMPLETED | OUTPATIENT
Start: 2021-08-01 | End: 2021-08-01

## 2021-08-01 RX ORDER — TACROLIMUS 5 MG/1
7 CAPSULE ORAL
Qty: 0 | Refills: 0 | DISCHARGE

## 2021-08-01 RX ORDER — SODIUM CHLORIDE 9 MG/ML
1000 INJECTION, SOLUTION INTRAVENOUS
Refills: 0 | Status: DISCONTINUED | OUTPATIENT
Start: 2021-08-01 | End: 2021-08-07

## 2021-08-01 RX ORDER — ACETAMINOPHEN 500 MG
650 TABLET ORAL EVERY 6 HOURS
Refills: 0 | Status: DISCONTINUED | OUTPATIENT
Start: 2021-08-01 | End: 2021-08-07

## 2021-08-01 RX ORDER — CEFEPIME 1 G/1
1000 INJECTION, POWDER, FOR SOLUTION INTRAMUSCULAR; INTRAVENOUS ONCE
Refills: 0 | Status: COMPLETED | OUTPATIENT
Start: 2021-08-01 | End: 2021-08-01

## 2021-08-01 RX ORDER — HEPARIN SODIUM 5000 [USP'U]/ML
5000 INJECTION INTRAVENOUS; SUBCUTANEOUS EVERY 8 HOURS
Refills: 0 | Status: DISCONTINUED | OUTPATIENT
Start: 2021-08-01 | End: 2021-08-07

## 2021-08-01 RX ORDER — SENNA PLUS 8.6 MG/1
2 TABLET ORAL AT BEDTIME
Refills: 0 | Status: DISCONTINUED | OUTPATIENT
Start: 2021-08-01 | End: 2021-08-01

## 2021-08-01 RX ORDER — NIFEDIPINE 30 MG
60 TABLET, EXTENDED RELEASE 24 HR ORAL DAILY
Refills: 0 | Status: DISCONTINUED | OUTPATIENT
Start: 2021-08-01 | End: 2021-08-07

## 2021-08-01 RX ORDER — POLYETHYLENE GLYCOL 3350 17 G/17G
17 POWDER, FOR SOLUTION ORAL DAILY
Refills: 0 | Status: DISCONTINUED | OUTPATIENT
Start: 2021-08-01 | End: 2021-08-01

## 2021-08-01 RX ORDER — TACROLIMUS 5 MG/1
7 CAPSULE ORAL DAILY
Refills: 0 | Status: DISCONTINUED | OUTPATIENT
Start: 2021-08-01 | End: 2021-08-07

## 2021-08-01 RX ORDER — TAMSULOSIN HYDROCHLORIDE 0.4 MG/1
0.4 CAPSULE ORAL AT BEDTIME
Refills: 0 | Status: DISCONTINUED | OUTPATIENT
Start: 2021-08-01 | End: 2021-08-07

## 2021-08-01 RX ORDER — DEXTROSE 50 % IN WATER 50 %
25 SYRINGE (ML) INTRAVENOUS ONCE
Refills: 0 | Status: DISCONTINUED | OUTPATIENT
Start: 2021-08-01 | End: 2021-08-07

## 2021-08-01 RX ORDER — GLUCAGON INJECTION, SOLUTION 0.5 MG/.1ML
1 INJECTION, SOLUTION SUBCUTANEOUS ONCE
Refills: 0 | Status: DISCONTINUED | OUTPATIENT
Start: 2021-08-01 | End: 2021-08-07

## 2021-08-01 RX ORDER — CEFEPIME 1 G/1
1000 INJECTION, POWDER, FOR SOLUTION INTRAMUSCULAR; INTRAVENOUS EVERY 24 HOURS
Refills: 0 | Status: DISCONTINUED | OUTPATIENT
Start: 2021-08-02 | End: 2021-08-07

## 2021-08-01 RX ORDER — ALLOPURINOL 300 MG
100 TABLET ORAL DAILY
Refills: 0 | Status: DISCONTINUED | OUTPATIENT
Start: 2021-08-01 | End: 2021-08-07

## 2021-08-01 RX ORDER — SODIUM CHLORIDE 9 MG/ML
1000 INJECTION INTRAMUSCULAR; INTRAVENOUS; SUBCUTANEOUS
Refills: 0 | Status: DISCONTINUED | OUTPATIENT
Start: 2021-08-01 | End: 2021-08-02

## 2021-08-01 RX ORDER — DEXTROSE 50 % IN WATER 50 %
15 SYRINGE (ML) INTRAVENOUS ONCE
Refills: 0 | Status: DISCONTINUED | OUTPATIENT
Start: 2021-08-01 | End: 2021-08-07

## 2021-08-01 RX ORDER — INSULIN LISPRO 100/ML
VIAL (ML) SUBCUTANEOUS
Refills: 0 | Status: DISCONTINUED | OUTPATIENT
Start: 2021-08-01 | End: 2021-08-07

## 2021-08-01 RX ORDER — METOPROLOL TARTRATE 50 MG
100 TABLET ORAL DAILY
Refills: 0 | Status: DISCONTINUED | OUTPATIENT
Start: 2021-08-01 | End: 2021-08-07

## 2021-08-01 RX ADMIN — Medication 15 MILLILITER(S): at 18:51

## 2021-08-01 RX ADMIN — TACROLIMUS 7 MILLIGRAM(S): 5 CAPSULE ORAL at 18:51

## 2021-08-01 RX ADMIN — Medication 15 MILLILITER(S): at 22:24

## 2021-08-01 RX ADMIN — CEFEPIME 100 MILLIGRAM(S): 1 INJECTION, POWDER, FOR SOLUTION INTRAMUSCULAR; INTRAVENOUS at 13:36

## 2021-08-01 RX ADMIN — Medication 100 MILLIGRAM(S): at 22:24

## 2021-08-01 RX ADMIN — CEFTRIAXONE 100 MILLIGRAM(S): 500 INJECTION, POWDER, FOR SOLUTION INTRAMUSCULAR; INTRAVENOUS at 07:20

## 2021-08-01 RX ADMIN — Medication 60 MILLIGRAM(S): at 18:51

## 2021-08-01 RX ADMIN — SODIUM CHLORIDE 80 MILLILITER(S): 9 INJECTION INTRAMUSCULAR; INTRAVENOUS; SUBCUTANEOUS at 17:09

## 2021-08-01 RX ADMIN — TAMSULOSIN HYDROCHLORIDE 0.4 MILLIGRAM(S): 0.4 CAPSULE ORAL at 22:24

## 2021-08-01 RX ADMIN — SODIUM CHLORIDE 80 MILLILITER(S): 9 INJECTION INTRAMUSCULAR; INTRAVENOUS; SUBCUTANEOUS at 13:40

## 2021-08-01 RX ADMIN — MYCOPHENOLATE MOFETIL 250 MILLIGRAM(S): 250 CAPSULE ORAL at 17:13

## 2021-08-01 NOTE — ED ADULT NURSE NOTE - OBJECTIVE STATEMENT
[id number] 70ym aox4 hx kidney transplant 7/2018 c/o abd pain constipation x 1wk, dysuria w/ urinary retention x 3 days in no acute distress pt able to verbalize concerns, pending eval

## 2021-08-01 NOTE — H&P ADULT - PROBLEM SELECTOR PLAN 1
with baseline creatinine in the 2-range, suspect CKD stage III as well.  Etiology most likely secondary to urinary retention. Alternate etiologies to consider would be outpatient bactrim use, urinary tract infection, hypovolemia in setting of reported diarrhea.    1. Maintain Velez catheter  2. Monitor Is and Os.  3. Repeat BMP this evening to monitor for post-obstructive diuresis.  4. Continue normal saline at 80 cc/hr through the rest of the day.  5. Avoid nephrotoxic medications.  6. Transplant nephrology f/u.

## 2021-08-01 NOTE — ED PROVIDER NOTE - IV ALTEPLASE INCLUSION HIDDEN
show Patient presenting with Cr of 1.90, baseline of 1.4 earlier this year though in prior records as low as 0.8. Likely element of CKD as well.  - Obtain urine electrolytes  - Renal sono  - Trend BMP Patient presenting with Cr 1.90, baseline of 1.4 earlier this year though in prior records as low as 0.8. Likely element of CKD as well.  - F/u urine electrolytes  - Pending Renal US  - Trend BUN/Crea, 45/2.14 today  - Avoid nephrotoxic agents  -Renally dose all meds  -HOLD Valsartan in setting of ANNETTE on CKD Patient presenting with Cr 1.90, baseline of 1.4 earlier this year though in prior records as low as 0.8. Likely element of CKD as well.  - F/u urine electrolytes  - Pending Renal US  - Trend BUN/Crea, 45/2.14 today  - Avoid nephrotoxic agents  - Renally dose all meds  - HOLD Valsartan in setting of ANNETTE on CKD  - F/u Renal Dr Martinez recs

## 2021-08-01 NOTE — H&P ADULT - ASSESSMENT
Mr. 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

## 2021-08-01 NOTE — ED PROVIDER NOTE - PHYSICAL EXAMINATION
GENERAL: well appearing in no acute distress, non-toxic appearing  HEAD: normocephalic, atraumatic  HEENT: normal conjunctiva, oral mucosa moist, uvula midline, no tonsilar exudates, neck supple, no JVD  CARDIAC: regular rate and rhythm, normal S1S2, no appreciable murmurs, 2+ pulses in UE/LE b/l  PULM: normal breath sounds, clear to ascultation bilaterally, no rales, rhonchi, wheezing  GI: abdomen nondistended, soft, nontender, no guarding, rebound tenderness. Multiple scars on abd consistent with previous kidney transplant, large incisional hernia with easily reducible contents.   : no CVA tenderness b/l, no suprapubic tenderness  NEURO: no focal motor or sensory deficits, CN2-12 intact, normal speech, PERRLA, EOMI, normal gait, AAOx3  MSK: no peripheral edema, no calf tenderness b/l  SKIN: well-perfused, extremities warm, no visible rashes  PSYCH: appropriate mood and affect

## 2021-08-01 NOTE — H&P ADULT - PROBLEM SELECTOR PLAN 6
1. Holding oral hypoglycemics.  2. Admelog insulin sliding scale coverage.  3. FS monitor TID and qhs.  4. Diabetic diet.  5. Check HbA1c in AM.

## 2021-08-01 NOTE — CONSULT NOTE ADULT - SUBJECTIVE AND OBJECTIVE BOX
[facility] DIVISION OF KIDNEY DISEASES AND HYPERTENSION -- INITIAL CONSULT NOTE  --------------------------------------------------------------------------------  HPI:    Mr. Medrano is a 70 y.o male with a history of ESRD since 2015 after bilateral nephrectomies for RCC, chronic diarrhea after bowel resection, s/p DDRT 2018, DDRT 2018 complicated by ureteral stricture currently with indwelling JJ stent, recurrent UTI, CMV, prostate cancer on RT who presents with UTI, CHELA and urinary retention.  Pt has been on cipro for UTI as outpatient.  Creatinine was rising and sonogram revealed incomplete void.  Has also been having diarrhea worse than usual.  Today had lower abdominal pain and found to have urinary retention in ER and armenta was placed.  Creatinine up to 5 from baseline of about 3.  Pt currently feels better.      History as below:  He has known DM since age 41, HTN.  Has b/l nephrectomies for RCC, left on 2015 and right 2015.  H/o intestinal blockage and was hospitalized at [facility] in 2017. Had surgical correction. Has left UE AVF.    s/p  donor kidney transplant on 18.  HCV donor. 40 yrs old male. cold ischemia time 23 hrs. Had delayed graft function but kidney eventually worked with best creatinine 1.9.     PT had several admissions post transplant. Early on he was readmitted for anemia and hematoma. Required 2 kidney biopsies which revealed few oxalate crystals and borderline rejection for which he was treated with solumedrol 375 and prednisone taper. He is on bicitra to reduce risk of oxalate stones. Also admitted for CHELA few times, UTI's and CMV.   Pt admitted for CHELA and hydronephrosis 2020. Had nephrostomy tube then NU tube placed. then admitted for UTI. Went for ureteral stricture dilation and replacement of NU tube on 8/3/20.    Pt was treated for UTI in 2020. Pt had NU tube check in September, was not changed. Eventually NU tube changed to internal JJ stent. Creatinine about 3.   Following with Dr. Phillip for prostate cancer and Dr. Becerra for RT.      PAST HISTORY  --------------------------------------------------------------------------------  PAST MEDICAL & SURGICAL HISTORY:  HTN (hypertension)    Type 2 diabetes mellitus  dx: &#x27;88    ESRD (end stage renal disease)  On Dialysis &#x27;  to 2018    Kidney neoplasm  dx: &#x27; 2015 : Left      s/p bilateral Nephrectomy &#x27; 2015    Hepatitis C  In Donor Kidney: patient received treatment for Hep. 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 s/p Reversal    S/P right knee arthroscopy  &#x27; 90&#x27;s    Kidney transplant recipient  2018  @ [facility] :  +  Hep C Donor      FAMILY HISTORY:  No pertinent family history in first degree relatives      PAST SOCIAL HISTORY:    ALLERGIES & MEDICATIONS  --------------------------------------------------------------------------------  Allergies    No Known Allergies    Intolerances      Standing Inpatient Medications    PRN Inpatient Medications      REVIEW OF SYSTEMS  --------------------------------------------------------------------------------  Gen: + wt loss  Skin: No rashes  Head/Eyes/Ears/Mouth: No headache; Normal hearing; Normal vision w/o blurriness; No sinus pain/discomfort, sore throat  Respiratory: No dyspnea, cough, wheezing, hemoptysis  CV: No chest pain, PND, orthopnea  GI: No abdominal pain, diarrhea, constipation, nausea, vomiting, melena, hematochezia  : trouble voiding  MSK: No joint pain/swelling; no back pain; no edema  Neuro: No dizziness/lightheadedness, weakness, seizures, numbness, tingling  Heme: No easy bruising or bleeding  Endo: No heat/cold intolerance  Psych: No significant nervousness, anxiety, stress, depression    All other systems were reviewed and are negative, except as noted.    VITALS/PHYSICAL EXAM  --------------------------------------------------------------------------------  T(C): 37.1 (21 @ 10:04), Max: 37.1 (21 @ 10:04)  HR: 65 (21 @ 10:04) (65 - 80)  BP: 140/74 (21 @ 10:04) (132/74 - 140/74)  RR: 18 (21 @ 10:04) (18 - 18)  SpO2: 100% (21 @ 10:04) (96% - 100%)  Wt(kg): --  Height (cm): 179.1 (21 @ 04:44)  Weight (kg): 94.3 (21 @ 04:44)  BMI (kg/m2): 29.4 (21 @ 04:44)  BSA (m2): 2.13 (21 @ 04:44)      Physical Exam:  	Gen: NAD  	HEENT: PERRL, supple neck, clear oropharynx  	Pulm: CTA B/L  	CV: RRR, S1S2; no rub  	Back: No spinal or CVA tenderness; no sacral edema  	Abd: +BS, soft, nontender/nondistended, multiple scars, ventral hernia present.                       Transplant: non tender  	: No suprapubic tenderness  	UE: Warm, FROM, no clubbing, intact strength; no edema; no asterixis  	LE: Warm, FROM, no clubbing, intact strength; no edema  	Neuro: No focal deficits, intact gait  	Psych: Normal affect and mood  	Skin: Warm, without rashes      LABS/STUDIES  --------------------------------------------------------------------------------              9.3    9.24  >-----------<  222      [21 @ 06:31]              30.3     135  |  100  |  56  ----------------------------<  105      [21 @ 06:31]  4.8   |  23  |  5.34        Ca     10.2     [21 @ 06:31]    TPro  7.5  /  Alb  4.0  /  TBili  0.2  /  DBili  x   /  AST  23  /  ALT  22  /  AlkPhos  81  [21 @ 06:31]          Creatinine Trend:  SCr 5.34 [ 06:31]    Urinalysis - [21 @ 06:31]      Color Yellow / Appearance Slightly Turbid / SG 1.015 / pH 6.5      Gluc Negative / Ketone Negative  / Bili Negative / Urobili Negative       Blood Small / Protein 300 mg/dL / Leuk Est Large / Nitrite Positive      RBC 8 /  / Hyaline 0 / Gran  / Sq Epi  / Non Sq Epi 1 / Bacteria Negative      Iron 75, TIBC 278, %sat 27      [21 @ 16:51]  Ferritin 450      [21 @ 17:06]  PTH -- (Ca 10.3)      [21 @ 09:37]   22  PTH -- (Ca --)      [21 @ 17:06]   15  Vitamin D (25OH) 37.0      [21 @ 09:37]  HbA1c 6.0      [19 @ 23:48]    HBsAb <3.0      [19 @ 22:44]  HBsAg Nonreact      [19 @ 22:44]  HBcAb Nonreact      [19 @ 22:44]  HCV 0.07, Nonreact      [19 @ 22:44]  HIV Nonreact      [08-15-18 @ 14:37]      Tacrolimus  Cyclosporine  Sirolimus  Mycophenolate  BK PCR  CMV PCR  Parvo PCR  EBV PCR

## 2021-08-01 NOTE — ED PROVIDER NOTE - PSH
AV fistula  2013/ left forearm  H/O ileostomy  '    for 3 months. s/p Reversal  Kidney transplant recipient  2018  @ [facility] :  +  Hep C Donor  S/p nephrectomy  right 12/10/2015   benign  S/p nephrectomy  left 9/15/2015   + Cancer  S/P right knee arthroscopy

## 2021-08-01 NOTE — H&P ADULT - PROBLEM SELECTOR PLAN 2
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 Has chronic ureteral stent as well which may be a nidus for infection.  1. Start cefepime 1g IV q24h (renal dosing). May need to adjust as creatinine improves.  2. F/U UCx.  3. Case discussed with urology PA - recommending IR evaluation if decision is made to remove or replace stent, as they placed stent initially  4. Trend CBC.

## 2021-08-01 NOTE — CONSULT NOTE ADULT - SUBJECTIVE AND OBJECTIVE BOX
HPI    69yo M with PMH RCC s/p bilateral nephrectomies in , renal transplant 2018 c/b stricture s/p stent by IR in 2021, and recent diagnosis of prostate Ca (Mauricio 4+3=7) currently undergoing Tx with Orgovyx and SBRT presented to ED, found to have UTI, CHELA, and urinary retention. UTI diagnosed as outpatient and started on cipro while cultures pending, culture from  resulted with pseudomonas resistant to cipro. Recent outpatient renal US demonstrated persistent mild hydro after void, urinary retention, and urothelial thickening concerning for chronic rejection. Patient underwent nephrostomy tube insertion in 2020, exchanges per IR and eventually internalized to indwelling stent in 2021. Patient has not had stent exchanged since that time. In ED, armenta placed for urinary retention, labs significant for CHELA (Cr 5 from baseline 3 per transplant surgery).      PAST MEDICAL & SURGICAL HISTORY:  HTN (hypertension)    Type 2 diabetes mellitus  dx: &#x27;88    ESRD (end stage renal disease)  On Dialysis &#x27;  to 2018    Kidney neoplasm  dx: &#x27;  : Left      s/p bilateral Nephrectomy &#x27; 2015    Hepatitis C  In Donor Kidney: patient received treatment for Hep. 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 s/p Reversal    S/P right knee arthroscopy  &#x27; 90&#x27;s    Kidney transplant recipient  2018  @ [facility] :  +  Hep C Donor        MEDICATIONS  (STANDING):  allopurinol 100 milliGRAM(s) Oral daily  cefepime   IVPB 1000 milliGRAM(s) IV Intermittent once  cefepime   IVPB      heparin   Injectable 5000 Unit(s) SubCutaneous every 8 hours  metoprolol succinate  milliGRAM(s) Oral daily  mycophenolate mofetil 250 milliGRAM(s) Oral two times a day  NIFEdipine XL 60 milliGRAM(s) Oral daily  predniSONE   Tablet 5 milliGRAM(s) Oral daily  sodium chloride 0.9%. 1000 milliLiter(s) (80 mL/Hr) IV Continuous <Continuous>  tacrolimus ER Tablet (ENVARSUS XR) 7 milliGRAM(s) Oral daily  tamsulosin 0.4 milliGRAM(s) Oral at bedtime    MEDICATIONS  (PRN):  acetaminophen   Tablet .. 650 milliGRAM(s) Oral every 6 hours PRN Temp greater or equal to 38.5C (101.3F), Mild Pain (1 - 3)      FAMILY HISTORY:  No pertinent family history in first degree relatives        Allergies    No Known Allergies    Intolerances        SOCIAL HISTORY:    REVIEW OF SYSTEMS: Otherwise negative as stated in HPI    Physical Exam  Vital signs  T(C): 37.1 (21 @ 10:04), Max: 37.1 (21 @ 10:04)  HR: 65 (21 @ 10:04)  BP: 140/74 (21 @ 10:04)  SpO2: 100% (21 @ 10:04)  Wt(kg): --    Output      Gen: NAD  Pulm: No respiratory distress	  CV: RRR  GI: S/ND/NT  :   MSK: moves all 4 limbs spontaneously    LABS:       @ 06:31    WBC 9.24  / Hct 30.3  / SCr 5.34         135  |  100  |  56<H>  ----------------------------<  105<H>  4.8   |  23  |  5.34<H>    Ca    10.2      01 Aug 2021 06:31    TPro  7.5  /  Alb  4.0  /  TBili  0.2  /  DBili  x   /  AST  23  /  ALT  22  /  AlkPhos  81  08      Urinalysis Basic - ( 01 Aug 2021 06:31 )    Color: Yellow / Appearance: Slightly Turbid / S.015 / pH: x  Gluc: x / Ketone: Negative  / Bili: Negative / Urobili: Negative   Blood: x / Protein: 300 mg/dL / Nitrite: Positive   Leuk Esterase: Large / RBC: 8 /hpf /  /HPF   Sq Epi: x / Non Sq Epi: 1 /hpf / Bacteria: Negative            Urine Cx:    Blood Cx:    RADIOLOGY:     HPI    69yo M with PMH RCC s/p bilateral nephrectomies in , renal transplant 2018 c/b stricture s/p stent by IR in 2021, and recent diagnosis of prostate Ca (Mauricio 4+3=7) currently undergoing Tx with Orgovyx and SBRT presented to ED with dysuria and urinary retention, found to have CHELA. UTI diagnosed as outpatient and started on cipro while cultures pending, culture from  resulted with pseudomonas resistant to cipro. Recent outpatient renal US demonstrated persistent mild hydro after void, urinary retention, and urothelial thickening concerning for chronic rejection. Patient underwent nephrostomy tube insertion in 2020, exchanges per IR and eventually internalized to indwelling stent in 2021. Patient has not had stent exchanged since that time. In ED, armenta placed for urinary retention (unclear return of urine amount), labs significant for CHELA (Cr 5 from baseline 3 per transplant surgery). Started on cefepime, which outpatient pseudomonas is sensitive.      PAST MEDICAL & SURGICAL HISTORY:  HTN (hypertension)    Type 2 diabetes mellitus  dx: &#x27;88    ESRD (end stage renal disease)  On Dialysis &#x27;  to 2018    Kidney neoplasm  dx: &#x27; 2015 : Left      s/p bilateral Nephrectomy &#x27; 2015    Hepatitis C  In Donor Kidney: patient received treatment for Hep. 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 s/p Reversal    S/P right knee arthroscopy  &#x27; 90&#x27;s    Kidney transplant recipient  2018  @ [facility] :  +  Hep C Donor        MEDICATIONS  (STANDING):  allopurinol 100 milliGRAM(s) Oral daily  cefepime   IVPB 1000 milliGRAM(s) IV Intermittent once  cefepime   IVPB      heparin   Injectable 5000 Unit(s) SubCutaneous every 8 hours  metoprolol succinate  milliGRAM(s) Oral daily  mycophenolate mofetil 250 milliGRAM(s) Oral two times a day  NIFEdipine XL 60 milliGRAM(s) Oral daily  predniSONE   Tablet 5 milliGRAM(s) Oral daily  sodium chloride 0.9%. 1000 milliLiter(s) (80 mL/Hr) IV Continuous <Continuous>  tacrolimus ER Tablet (ENVARSUS XR) 7 milliGRAM(s) Oral daily  tamsulosin 0.4 milliGRAM(s) Oral at bedtime    MEDICATIONS  (PRN):  acetaminophen   Tablet .. 650 milliGRAM(s) Oral every 6 hours PRN Temp greater or equal to 38.5C (101.3F), Mild Pain (1 - 3)      FAMILY HISTORY:  No pertinent family history in first degree relatives        Allergies    No Known Allergies    Intolerances        SOCIAL HISTORY:    REVIEW OF SYSTEMS: Otherwise negative as stated in HPI    Physical Exam  Vital signs  T(C): 37.1 (21 @ 10:04), Max: 37.1 (21 @ 10:04)  HR: 65 (21 @ 10:04)  BP: 140/74 (21 @ 10:04)  SpO2: 100% (21 @ 10:04)  Wt(kg): --    Output      Gen: NAD  Pulm: No respiratory distress	  CV: RRR  GI: S/ND/NT  :   MSK: moves all 4 limbs spontaneously    LABS:       @ 06:31    WBC 9.24  / Hct 30.3  / SCr 5.34         135  |  100  |  56<H>  ----------------------------<  105<H>  4.8   |  23  |  5.34<H>    Ca    10.2      01 Aug 2021 06:31    TPro  7.5  /  Alb  4.0  /  TBili  0.2  /  DBili  x   /  AST  23  /  ALT  22  /  AlkPhos  81        Urinalysis Basic - ( 01 Aug 2021 06:31 )    Color: Yellow / Appearance: Slightly Turbid / S.015 / pH: x  Gluc: x / Ketone: Negative  / Bili: Negative / Urobili: Negative   Blood: x / Protein: 300 mg/dL / Nitrite: Positive   Leuk Esterase: Large / RBC: 8 /hpf /  /HPF   Sq Epi: x / Non Sq Epi: 1 /hpf / Bacteria: Negative            Urine Cx: pending        RADIOLOGY:  < from: US Trans Kidney w/ Doppler, Right (21 @ 09:16) >    EXAM:  US KIDNEY TRANSPLANT W DOPP RT                            PROCEDURE DATE:  2021            INTERPRETATION:  CLINICAL INFORMATION: Acute kidney injury, elevated creatinine, renal transplant 2018.    COMPARISON: Renal transplant ultrasound dated 2021.    TECHNIQUE: Grayscale, Color and spectral Doppler evaluation of a RIGHT renal transplant.    FINDINGS:    Renal Transplant: 12.6 cm. ureteral stent noted. Mild hydronephrosis. Stable urothelial thickening. A 0.7 cm focus of debris is seen within the renal pelvis (series 2.80 image 151). No perinephric collection.  Urinary bladder: Decompressed around a Armenta catheter. Ureteral stent terminates in the bladder.    Color and spectral Doppler reveals homogeneous flow throughout the transplant.    Peak iliac artery velocity is 93 cm/sec pre-anastomosis, 133 cm/sec at the anastomosis, and 70 cm/sec post anastomosis.    Transplant Renal Artery:  Peak systolic velocity is 126 cm/sec anastomosis, 146 cm/sec proximal, 152 cm/sec mid, 108 cm/sec distal and 99 cm/sec hilum.  Resistive Indices Range: 0.78-0.85    Transplant Renal Vein: Patent.    IMPRESSION:    Stable mild hydronephrosis.    Stable urothelial thickening with persistent elevated resistive indices, again raising concern for chronic rejection versus infection.    A subcentimeter focus of debris in the renal pelvis is new and may represent blood clot versus sloughed papilla.    --- End of Report ---              JUAN CARLOS SARMIENTO MD; Resident Radiology  This document has been electronically signed.  RANDOLPH OAKES MD; Attending Radiologist  This document has been electronically signed. Aug  1 2021 10:35AM    < end of copied text >     HPI    69yo M with PMH RCC s/p bilateral nephrectomies in , renal transplant 2018 c/b stricture s/p stent by IR in 2021, and recent diagnosis of prostate Ca (Mauricio 4+3=7) currently undergoing Tx with Orgovyx and SBRT presented to ED with dysuria and difficulty urinating, found to have CHELA and urinary retention. Recently had UTI diagnosed as outpatient and started on cipro while cultures pending, culture from  resulted with pseudomonas resistant to cipro. Recent outpatient renal US demonstrated persistent mild hydro after void, urinary retention, and urothelial thickening concerning for chronic rejection. Patient underwent nephrostomy tube insertion in 2020, exchanges per IR and eventually internalized to indwelling stent in 2021. Patient has not had stent exchanged since that time. In ED, armenta placed for urinary retention (unclear return of urine amount), labs significant for CHELA (Cr 5 from baseline 3 per transplant surgery). Started on cefepime, which outpatient pseudomonas is sensitive.      PAST MEDICAL & SURGICAL HISTORY:  HTN (hypertension)    Type 2 diabetes mellitus  dx: &#x27;88    ESRD (end stage renal disease)  On Dialysis &#x27;  to 2018    Kidney neoplasm  dx: &#x27; 2015 : Left      s/p bilateral Nephrectomy &#x27; 2015    Hepatitis C  In Donor Kidney: patient received treatment for Hep. 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 s/p Reversal    S/P right knee arthroscopy  &#x27; 90&#x27;s    Kidney transplant recipient  2018  @ [facility] :  +  Hep C Donor        MEDICATIONS  (STANDING):  allopurinol 100 milliGRAM(s) Oral daily  cefepime   IVPB 1000 milliGRAM(s) IV Intermittent once  cefepime   IVPB      heparin   Injectable 5000 Unit(s) SubCutaneous every 8 hours  metoprolol succinate  milliGRAM(s) Oral daily  mycophenolate mofetil 250 milliGRAM(s) Oral two times a day  NIFEdipine XL 60 milliGRAM(s) Oral daily  predniSONE   Tablet 5 milliGRAM(s) Oral daily  sodium chloride 0.9%. 1000 milliLiter(s) (80 mL/Hr) IV Continuous <Continuous>  tacrolimus ER Tablet (ENVARSUS XR) 7 milliGRAM(s) Oral daily  tamsulosin 0.4 milliGRAM(s) Oral at bedtime    MEDICATIONS  (PRN):  acetaminophen   Tablet .. 650 milliGRAM(s) Oral every 6 hours PRN Temp greater or equal to 38.5C (101.3F), Mild Pain (1 - 3)      FAMILY HISTORY:  No pertinent family history in first degree relatives        Allergies    No Known Allergies    Intolerances        SOCIAL HISTORY:    REVIEW OF SYSTEMS: Otherwise negative as stated in HPI    Physical Exam  Vital signs  T(C): 37.1 (21 @ 10:04), Max: 37.1 (21 @ 10:04)  HR: 65 (21 @ 10:04)  BP: 140/74 (21 @ 10:04)  SpO2: 100% (21 @ 10:04)  Wt(kg): --    Output      Gen: NAD  Pulm: No respiratory distress	  GI: S/ND/NT  : armenta secured in place, draining CYU  MSK: moves all 4 limbs spontaneously    LABS:       @ 06:31    WBC 9.24  / Hct 30.3  / SCr 5.34         135  |  100  |  56<H>  ----------------------------<  105<H>  4.8   |  23  |  5.34<H>    Ca    10.2      01 Aug 2021 06:31    TPro  7.5  /  Alb  4.0  /  TBili  0.2  /  DBili  x   /  AST  23  /  ALT  22  /  AlkPhos  81        Urinalysis Basic - ( 01 Aug 2021 06:31 )    Color: Yellow / Appearance: Slightly Turbid / S.015 / pH: x  Gluc: x / Ketone: Negative  / Bili: Negative / Urobili: Negative   Blood: x / Protein: 300 mg/dL / Nitrite: Positive   Leuk Esterase: Large / RBC: 8 /hpf /  /HPF   Sq Epi: x / Non Sq Epi: 1 /hpf / Bacteria: Negative            Urine Cx: pending        RADIOLOGY:  < from: US Trans Kidney w/ Doppler, Right (21 @ 09:16) >    EXAM:  US KIDNEY TRANSPLANT W DOPP RT                            PROCEDURE DATE:  2021            INTERPRETATION:  CLINICAL INFORMATION: Acute kidney injury, elevated creatinine, renal transplant 2018.    COMPARISON: Renal transplant ultrasound dated 2021.    TECHNIQUE: Grayscale, Color and spectral Doppler evaluation of a RIGHT renal transplant.    FINDINGS:    Renal Transplant: 12.6 cm. ureteral stent noted. Mild hydronephrosis. Stable urothelial thickening. A 0.7 cm focus of debris is seen within the renal pelvis (series 2.80 image 151). No perinephric collection.  Urinary bladder: Decompressed around a Armenta catheter. Ureteral stent terminates in the bladder.    Color and spectral Doppler reveals homogeneous flow throughout the transplant.    Peak iliac artery velocity is 93 cm/sec pre-anastomosis, 133 cm/sec at the anastomosis, and 70 cm/sec post anastomosis.    Transplant Renal Artery:  Peak systolic velocity is 126 cm/sec anastomosis, 146 cm/sec proximal, 152 cm/sec mid, 108 cm/sec distal and 99 cm/sec hilum.  Resistive Indices Range: 0.78-0.85    Transplant Renal Vein: Patent.    IMPRESSION:    Stable mild hydronephrosis.    Stable urothelial thickening with persistent elevated resistive indices, again raising concern for chronic rejection versus infection.    A subcentimeter focus of debris in the renal pelvis is new and may represent blood clot versus sloughed papilla.    --- End of Report ---              JUAN CARLOS SARMIENTO MD; Resident Radiology  This document has been electronically signed.  RANDOLPH OAKES MD; Attending Radiologist  This document has been electronically signed. Aug  1 2021 10:35AM    < end of copied text >

## 2021-08-01 NOTE — ED PROCEDURE NOTE - PROCEDURE ADDITIONAL DETAILS
Peripheral IV access in the Emergency Department obtained under dynamic ultrasound guidance with dark nonpulsatile blood return.  Catheter was flushed afterwards without any resistance or resulting extravasation.  IV catheter confirmed in compressible vein after insertion. [18] gauge catheter placed in a peripheral vein in the [right] upper extremity.

## 2021-08-01 NOTE — ED PROVIDER NOTE - CARE PLAN
Principal Discharge DX:	CHELA (acute kidney injury)  Secondary Diagnosis:	Urinary tract infection without hematuria, site unspecified  Secondary Diagnosis:	Urinary retention

## 2021-08-01 NOTE — ED ADULT NURSE NOTE - PSH
AV fistula  2013/ left forearm  H/O ileostomy  '    for 3 months. s/p Reversal  Kidney transplant recipient  2018  @ [facility] :  +  Hep C Donor  S/p nephrectomy  right 12/10/2015   benign  S/p nephrectomy  left 9/15/2015   + Cancer  S/P right knee arthroscopy

## 2021-08-01 NOTE — ED PROVIDER NOTE - CLINICAL SUMMARY MEDICAL DECISION MAKING FREE TEXT BOX
70M with h/o Kidney transplant (2018), HTN, BPH, Prostate ca, presents with 24 hours of urinary retention starting after his most recent radiation treatment Friday. c/f retention, CHELA. Will order labs, imaging, place armenta, and reassess. 70M with h/o Kidney transplant (2018), HTN, BPH, Prostate ca, presents with 24 hours of urinary retention starting after his most recent radiation treatment Friday. c/f retention, CHELA. Will order labs, imaging, place armenta, and reassess.    Ross: 70 year old male with kidney transplant here with 24 hours of urinary rentention. recent radiation for prostate cancer. will get labs, ivf, armenta, reasess.

## 2021-08-01 NOTE — H&P ADULT - NSICDXPASTSURGICALHX_GEN_ALL_CORE_FT
PAST SURGICAL HISTORY:  AV fistula 2013/ left forearm    H/O ileostomy 2017   for 3 months. s/p Reversal    Kidney transplant recipient 2018  @ [facility] :  +  Hep C Donor    S/p nephrectomy left 9/15/2015   + Cancer    S/p nephrectomy right 12/10/2015   benign    S/P right knee arthroscopy

## 2021-08-01 NOTE — H&P ADULT - NSHPPHYSICALEXAM_GEN_ALL_CORE
Vital Signs Last 24 Hrs  T(C): 37.1 (01 Aug 2021 10:04), Max: 37.1 (01 Aug 2021 10:04)  T(F): 98.8 (01 Aug 2021 10:04), Max: 98.8 (01 Aug 2021 10:04)  HR: 65 (01 Aug 2021 10:04) (65 - 80)  BP: 140/74 (01 Aug 2021 10:04) (132/74 - 140/74)  BP(mean): --  RR: 18 (01 Aug 2021 10:04) (18 - 18)  SpO2: 100% (01 Aug 2021 10:04) (96% - 100%)    CONSTITUTIONAL: Well-groomed, in no apparent distress  EYES: No conjunctival or scleral injection, non-icteric; PERRL and symmetric  ENMT: No external nasal lesions; nasal mucosa not inflamed; no pharyngeal injection or exudates, oral mucosa with moist membranes  NECK: Trachea midline without palpable neck mass; thyroid not enlarged and non-tender  RESPIRATORY: Breathing comfortably; no dullness to percussion; lungs CTA without wheeze/rhonchi/rales  CARDIOVASCULAR: +S1S2, RRR, no M/G/R; no carotid bruits; pedal pulses full and symmetric; no lower extremity edema  GASTROINTESTINAL: No palpable masses or tenderness, +BS throughout, no rebound/guarding; no hepatosplenomegaly; no hernia palpated; + Velez catheter  RECTAL: No mass palpated. Prostate boggy and enlarged. No stool in vault.  LYMPHATIC: No cervical LAD or tenderness  MUSCULOSKELETAL: No digital clubbing or cyanosis; no paraspinal tenderness; examination of the RUE, LUE, RLE, LLE without misalignment, normal strength and tone of extremities  SKIN: No rashes or ulcers noted; no subcutaneous nodules or induration palpable  NEUROLOGIC: CN II-XII intact; normal reflexes in upper and lower extremities; sensation intact in LEs b/l to light touch  PSYCHIATRIC: A+O x 3; mood and affect appropriate; appropriate insight and judgment Vital Signs Last 24 Hrs  T(C): 37.1 (01 Aug 2021 10:04), Max: 37.1 (01 Aug 2021 10:04)  T(F): 98.8 (01 Aug 2021 10:04), Max: 98.8 (01 Aug 2021 10:04)  HR: 65 (01 Aug 2021 10:04) (65 - 80)  BP: 140/74 (01 Aug 2021 10:04) (132/74 - 140/74)  BP(mean): --  RR: 18 (01 Aug 2021 10:04) (18 - 18)  SpO2: 100% (01 Aug 2021 10:04) (96% - 100%)    CONSTITUTIONAL: Well-groomed, in no apparent distress  EYES: No conjunctival or scleral injection, non-icteric; PERRL and symmetric  ENMT: No external nasal lesions; nasal mucosa not inflamed; no pharyngeal injection or exudates, oral mucosa with moist membranes  NECK: Trachea midline without palpable neck mass; thyroid not enlarged and non-tender  RESPIRATORY: Breathing comfortably; no dullness to percussion; lungs CTA without wheeze/rhonchi/rales  CARDIOVASCULAR: +S1S2, RRR, no M/G/R; no carotid bruits; pedal pulses full and symmetric; no lower extremity edema  GASTROINTESTINAL: No palpable masses or tenderness, +BS throughout, no rebound/guarding; no hepatosplenomegaly; multiple abdominal scars including midline incision with small reducible incisional hernia; + Velez catheter  RECTAL: No mass palpated. Prostate boggy and enlarged. No stool in vault.  LYMPHATIC: No cervical LAD or tenderness  MUSCULOSKELETAL: No digital clubbing or cyanosis; no paraspinal tenderness; examination of the RUE, LUE, RLE, LLE without misalignment, normal strength and tone of extremities  SKIN: No rashes or ulcers noted; no subcutaneous nodules or induration palpable  NEUROLOGIC: CN II-XII intact; normal reflexes in upper and lower extremities; sensation intact in LEs b/l to light touch  PSYCHIATRIC: A+O x 3; mood and affect appropriate; appropriate insight and judgment

## 2021-08-01 NOTE — H&P ADULT - PROBLEM SELECTOR PLAN 3
no
1. Resume home dose of envarsus 7 mg PO daily.  2. Resume home dose of  mg PO BID.  3. Resume home dose of prednisone 5 mg PO daily.  4. Will discuss with renal team the utility of resuming PCP PPx (likely atovaquone), which patient states he stopped taking at home.  5. 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 Continue allopurinol.

## 2021-08-01 NOTE — ED PROVIDER NOTE - WR ORDER DATE AND TIME 1
Pt is 6 weeks pregnant who c/o LLQ abdominal pain started this evening + diarrhea few days ago which was resolved for 2 days and came back yesterday morning+ spotting this afternoon which was already resolved, denies fever, sts that she is urinating more f 01-Aug-2021 05:43

## 2021-08-01 NOTE — H&P ADULT - NSICDXFAMILYHX_GEN_ALL_CORE_FT
FAMILY HISTORY:  Father  Still living? Unknown  FH: type 2 diabetes, Age at diagnosis: Age Unknown    Mother  Still living? Unknown  FH: breast cancer, Age at diagnosis: Age Unknown

## 2021-08-01 NOTE — H&P ADULT - NSICDXPASTMEDICALHX_GEN_ALL_CORE_FT
PAST MEDICAL HISTORY:  Anal fissure dx: ' 2018   No surgery    Benign prostatic hypertrophy     Bowel obstruction ' 2017   surgically treated    ESRD (end stage renal disease) On Dialysis ' 2015 to 7/2018    Hepatitis C In Donor Kidney: patient received treatment for Hep. C : post treatment: patient  tested Negative for Hep C    HTN (hypertension)     Medial meniscus tear ' 90's  Right    Prostate cancer     Renal cell carcinoma     Type 2 diabetes mellitus dx: '88    Ureteral stricture of kidney transplant

## 2021-08-01 NOTE — H&P ADULT - PROBLEM SELECTOR PLAN 4
Unclear if this is fulminant diarrhea or constipation with overflow. Patients symptoms and presentation are atypical.  1. Let's see what presents here in the hospital. Hold off on adding any stool softners/laxatives/anti-diarrheals.  2. If no diarrhea, will consider addition of stool softeners.  3. If true diarrhea, will begin work-up for infectious causes, like C. diff and GI PCR testing  4. Monitor PO intake.

## 2021-08-01 NOTE — ED PROVIDER NOTE - NS ED ROS FT
General: denies fever, chills  HENT: denies nasal congestion, rhinorrhea  Eyes: denies visual changes, blurred vision  CV: denies chest pain, palpitations  Resp: denies difficulty breathing, cough  Abdominal: denies nausea, vomiting, diarrhea, abdominal pain  : pain with urination, difficulty initiating stream, poor stream, dribbling, denies discharge.   MSK: denies muscle aches, leg swelling  Neuro: denies headaches, numbness, tingling  Skin: denies rashes, bruises

## 2021-08-01 NOTE — H&P ADULT - HISTORY OF PRESENT ILLNESS
Mr. 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  The patient states that underwent a radiation therapy treatment for his prostate on 7/30/21. This was not his first session. Since that time, he has noted progressive decrease in the amount of urine that he is producing, amounting now to just "dribbling". In addition, he began experiencing pain on urination and increasing abdominal "pressure", located primarily in his lower abdomen. Denies gross hematuria. Also endorses both constipation and diarrhea. States that he had a BM earlier this morning that was "pellets", but yesterday was passing pure water. Denies nausea, vomiting, fever, chills, flank pain. Was admitted at [facility] in June 2021 for CHELA and UTI at that time as well, treated with cefepime.

## 2021-08-01 NOTE — CONSULT NOTE ADULT - ASSESSMENT
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     1.  Renal transplant complicated by CHELA - CHELA likely combination of urinary retention and UTI.  Continue armenta catheter, and start cefepime for pseudomonas UTI.  Consult Dr. Phillip from Urology for JJ stent exchange once UTI improved.  Can hydrate for 24 hours.  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    2.  Immunosuppression - continue home dose of Envarsus 7mgs daily, MMF 250mgs BID and pred 5.  Check tacrolimus trough in the morning.   3.  UTI - as above, would start cefepime.  4.  Diarrhea - check GI pcr, CMV pcr and c diff if persists.  Has some degree of chronic diarrhea.   5.  HTN - resume home meds as BP rises.   6.  DM2 - insulin sliding scale.  Was on Januvia and Glimepiride at home.

## 2021-08-01 NOTE — CONSULT NOTE ADULT - ASSESSMENT
TO BE FINALIZED 69yo M with PMH RCC s/p bilateral nephrectomies in 2015, renal transplant July 2018 c/b stricture s/p stent by IR in April 2021, and recent diagnosis of prostate Ca ([place] 4+3=7) currently undergoing Tx with Orgovyx and SBRT presented to ED with dysuria and difficulty urinating, found to have CHELA and urinary retention.      --no acute  intervention warranted at this time while patient has acute urinary tract infection, will need elective stent exchange once treated with appropriate antibiotics, can follow up as outpatient with Dr. Phillip 1-2 weeks after discharge  --continue with armenta catheter --> may give trial of void after culture results finalized if patient clinically improved, ambulating, mentating, and stooling at baseline; if fails TOV, replace armenta and plan for TOV as outpatient  --bowel regimen for constipation  --continue flomax 0.4mg qHS   --continue with cefepime (outpt pseudomonas is sensitive), FU urine culture and narrow per sensitivities  --to be discussed with attending 71yo M with PMH RCC s/p bilateral nephrectomies in 2015, renal transplant July 2018 c/b stricture s/p stent by IR in April 2021, and recent diagnosis of prostate Ca ([place] 4+3=7) currently undergoing Tx with Orgovyx and SBRT presented to ED with dysuria and difficulty urinating, found to have CHELA and urinary retention.      --no acute  intervention warranted at this time while patient has acute urinary tract infection, will need elective stent exchange once treated with appropriate antibiotics, can follow up as outpatient with Dr. Phillip after discharge  --continue with armenta catheter --> may give trial of void after culture results finalized if patient clinically improved, ambulating, mentating, and stooling at baseline; if fails TOV, replace armenta and plan for TOV as outpatient  --bowel regimen for constipation  --continue flomax 0.4mg qHS   --continue with cefepime (outpt pseudomonas is sensitive), FU urine culture and narrow per sensitivities  --discussed with attending, Dr. Phillip 69yo M with PMH RCC s/p bilateral nephrectomies in 2015, renal transplant July 2018 c/b stricture s/p stent by IR in April 2021, and recent diagnosis of prostate Ca ([place] 4+3=7) currently undergoing Tx with Orgovyx and SBRT presented to ED with dysuria and difficulty urinating, found to have CHELA and urinary retention.      --no acute  intervention warranted at this time while patient has acute urinary tract infection, will need elective stent exchange once treated with appropriate antibiotics, can follow up as outpatient with Dr. Phillip after discharge  --continue with ramenta catheter --> may give trial of void after culture results finalized if patient clinically improved, ambulating, mentating, and stooling at baseline; if fails TOV, replace armenta and plan for TOV as outpatient  --bowel regimen for constipation  --continue flomax 0.4mg qHS   --trend Cr  --monitor urine output, document in flowsheets q4h  --continue with cefepime (outpt pseudomonas is sensitive), FU urine culture and narrow per sensitivities  --discussed with attending, Dr. Phillip

## 2021-08-01 NOTE — ED ADULT NURSE REASSESSMENT NOTE - NS ED NURSE REASSESS COMMENT FT1
500cc output from Velez catheter with copious sediment, IV line in RAC no longer patent, MD aware and will place US guided PIV.
per bladder scan patient has retained urine >286 after using urinal  0631 pt able to tolerate indwelling armenta catheter 16fr placed, noted >300ml in ua bag

## 2021-08-01 NOTE — CONSULT NOTE ADULT - ATTENDING COMMENTS
ARF in this setting most likely related to urinary retention caused by UTI  After antibiotic treatment and catheter decompression, patient should have outpatient follow up to discuss bladder outlet surgery to help reduce the risk of additional UTI and also for stent exchange

## 2021-08-01 NOTE — H&P ADULT - NSHPLABSRESULTS_GEN_ALL_CORE
Labs reviewed as below.  No EKG for review.    Renal US: Stable mild hydronephrosis.    Stable urothelial thickening with persistent elevated resistive indices, again raising concern for chronic rejection versus infection.    A subcentimeter focus of debris in the renal pelvis is new and may represent blood clot versus sloughed papilla.    Abdominal XR personally reviewed: Nonspecific bowel gas pattern. No obstruction identified.

## 2021-08-02 LAB
A1C WITH ESTIMATED AVERAGE GLUCOSE RESULT: 6.5 % — HIGH (ref 4–5.6)
ALBUMIN SERPL ELPH-MCNC: 3.5 G/DL — SIGNIFICANT CHANGE UP (ref 3.3–5)
ALP SERPL-CCNC: 66 U/L — SIGNIFICANT CHANGE UP (ref 40–120)
ALT FLD-CCNC: 15 U/L — SIGNIFICANT CHANGE UP (ref 10–45)
ANION GAP SERPL CALC-SCNC: 10 MMOL/L — SIGNIFICANT CHANGE UP (ref 5–17)
ANION GAP SERPL CALC-SCNC: 12 MMOL/L — SIGNIFICANT CHANGE UP (ref 5–17)
ANION GAP SERPL CALC-SCNC: 13 MMOL/L — SIGNIFICANT CHANGE UP (ref 5–17)
AST SERPL-CCNC: 17 U/L — SIGNIFICANT CHANGE UP (ref 10–40)
BILIRUB SERPL-MCNC: 0.2 MG/DL — SIGNIFICANT CHANGE UP (ref 0.2–1.2)
BUN SERPL-MCNC: 43 MG/DL — HIGH (ref 7–23)
BUN SERPL-MCNC: 44 MG/DL — HIGH (ref 7–23)
BUN SERPL-MCNC: 45 MG/DL — HIGH (ref 7–23)
CALCIUM SERPL-MCNC: 9.6 MG/DL — SIGNIFICANT CHANGE UP (ref 8.4–10.5)
CALCIUM SERPL-MCNC: 9.7 MG/DL — SIGNIFICANT CHANGE UP (ref 8.4–10.5)
CALCIUM SERPL-MCNC: 9.7 MG/DL — SIGNIFICANT CHANGE UP (ref 8.4–10.5)
CHLORIDE SERPL-SCNC: 103 MMOL/L — SIGNIFICANT CHANGE UP (ref 96–108)
CHLORIDE SERPL-SCNC: 104 MMOL/L — SIGNIFICANT CHANGE UP (ref 96–108)
CHLORIDE SERPL-SCNC: 104 MMOL/L — SIGNIFICANT CHANGE UP (ref 96–108)
CO2 SERPL-SCNC: 21 MMOL/L — LOW (ref 22–31)
COVID-19 SPIKE DOMAIN AB INTERP: POSITIVE
COVID-19 SPIKE DOMAIN ANTIBODY RESULT: >250 U/ML — HIGH
CREAT SERPL-MCNC: 3.93 MG/DL — HIGH (ref 0.5–1.3)
CREAT SERPL-MCNC: 4.1 MG/DL — HIGH (ref 0.5–1.3)
CREAT SERPL-MCNC: 4.2 MG/DL — HIGH (ref 0.5–1.3)
ESTIMATED AVERAGE GLUCOSE: 140 MG/DL — HIGH (ref 68–114)
GLUCOSE BLDC GLUCOMTR-MCNC: 140 MG/DL — HIGH (ref 70–99)
GLUCOSE BLDC GLUCOMTR-MCNC: 144 MG/DL — HIGH (ref 70–99)
GLUCOSE BLDC GLUCOMTR-MCNC: 157 MG/DL — HIGH (ref 70–99)
GLUCOSE BLDC GLUCOMTR-MCNC: 209 MG/DL — HIGH (ref 70–99)
GLUCOSE SERPL-MCNC: 144 MG/DL — HIGH (ref 70–99)
GLUCOSE SERPL-MCNC: 146 MG/DL — HIGH (ref 70–99)
GLUCOSE SERPL-MCNC: 213 MG/DL — HIGH (ref 70–99)
HCT VFR BLD CALC: 30.6 % — LOW (ref 39–50)
HCT VFR BLD CALC: 30.9 % — LOW (ref 39–50)
HGB BLD-MCNC: 9.3 G/DL — LOW (ref 13–17)
HGB BLD-MCNC: 9.5 G/DL — LOW (ref 13–17)
MAGNESIUM SERPL-MCNC: 1.9 MG/DL — SIGNIFICANT CHANGE UP (ref 1.6–2.6)
MCHC RBC-ENTMCNC: 29.1 PG — SIGNIFICANT CHANGE UP (ref 27–34)
MCHC RBC-ENTMCNC: 29.2 PG — SIGNIFICANT CHANGE UP (ref 27–34)
MCHC RBC-ENTMCNC: 30.4 GM/DL — LOW (ref 32–36)
MCHC RBC-ENTMCNC: 30.7 GM/DL — LOW (ref 32–36)
MCV RBC AUTO: 94.8 FL — SIGNIFICANT CHANGE UP (ref 80–100)
MCV RBC AUTO: 95.9 FL — SIGNIFICANT CHANGE UP (ref 80–100)
NRBC # BLD: 0 /100 WBCS — SIGNIFICANT CHANGE UP (ref 0–0)
NRBC # BLD: 0 /100 WBCS — SIGNIFICANT CHANGE UP (ref 0–0)
PHOSPHATE SERPL-MCNC: 3.5 MG/DL — SIGNIFICANT CHANGE UP (ref 2.5–4.5)
PLATELET # BLD AUTO: 209 K/UL — SIGNIFICANT CHANGE UP (ref 150–400)
PLATELET # BLD AUTO: 221 K/UL — SIGNIFICANT CHANGE UP (ref 150–400)
POTASSIUM SERPL-MCNC: 5.1 MMOL/L — SIGNIFICANT CHANGE UP (ref 3.5–5.3)
POTASSIUM SERPL-MCNC: 5.1 MMOL/L — SIGNIFICANT CHANGE UP (ref 3.5–5.3)
POTASSIUM SERPL-MCNC: 5.5 MMOL/L — HIGH (ref 3.5–5.3)
POTASSIUM SERPL-SCNC: 5.1 MMOL/L — SIGNIFICANT CHANGE UP (ref 3.5–5.3)
POTASSIUM SERPL-SCNC: 5.1 MMOL/L — SIGNIFICANT CHANGE UP (ref 3.5–5.3)
POTASSIUM SERPL-SCNC: 5.5 MMOL/L — HIGH (ref 3.5–5.3)
PROT SERPL-MCNC: 6.5 G/DL — SIGNIFICANT CHANGE UP (ref 6–8.3)
RBC # BLD: 3.19 M/UL — LOW (ref 4.2–5.8)
RBC # BLD: 3.26 M/UL — LOW (ref 4.2–5.8)
RBC # FLD: 14.9 % — HIGH (ref 10.3–14.5)
RBC # FLD: 14.9 % — HIGH (ref 10.3–14.5)
SARS-COV-2 IGG+IGM SERPL QL IA: >250 U/ML — HIGH
SARS-COV-2 IGG+IGM SERPL QL IA: POSITIVE
SODIUM SERPL-SCNC: 135 MMOL/L — SIGNIFICANT CHANGE UP (ref 135–145)
SODIUM SERPL-SCNC: 137 MMOL/L — SIGNIFICANT CHANGE UP (ref 135–145)
SODIUM SERPL-SCNC: 137 MMOL/L — SIGNIFICANT CHANGE UP (ref 135–145)
TACROLIMUS SERPL-MCNC: 13.1 NG/ML — SIGNIFICANT CHANGE UP
WBC # BLD: 6.9 K/UL — SIGNIFICANT CHANGE UP (ref 3.8–10.5)
WBC # BLD: 7 K/UL — SIGNIFICANT CHANGE UP (ref 3.8–10.5)
WBC # FLD AUTO: 6.9 K/UL — SIGNIFICANT CHANGE UP (ref 3.8–10.5)
WBC # FLD AUTO: 7 K/UL — SIGNIFICANT CHANGE UP (ref 3.8–10.5)

## 2021-08-02 PROCEDURE — 99233 SBSQ HOSP IP/OBS HIGH 50: CPT

## 2021-08-02 PROCEDURE — 99222 1ST HOSP IP/OBS MODERATE 55: CPT

## 2021-08-02 RX ORDER — DEXTROSE 50 % IN WATER 50 %
50 SYRINGE (ML) INTRAVENOUS ONCE
Refills: 0 | Status: COMPLETED | OUTPATIENT
Start: 2021-08-02 | End: 2021-08-02

## 2021-08-02 RX ORDER — INSULIN HUMAN 100 [IU]/ML
10 INJECTION, SOLUTION SUBCUTANEOUS ONCE
Refills: 0 | Status: COMPLETED | OUTPATIENT
Start: 2021-08-02 | End: 2021-08-02

## 2021-08-02 RX ORDER — PHENYLEPHRINE-SHARK LIVER OIL-MINERAL OIL-PETROLATUM RECTAL OINTMENT
1 OINTMENT (GRAM) RECTAL EVERY 8 HOURS
Refills: 0 | Status: DISCONTINUED | OUTPATIENT
Start: 2021-08-02 | End: 2021-08-07

## 2021-08-02 RX ORDER — POLYETHYLENE GLYCOL 3350 17 G/17G
17 POWDER, FOR SOLUTION ORAL DAILY
Refills: 0 | Status: DISCONTINUED | OUTPATIENT
Start: 2021-08-02 | End: 2021-08-02

## 2021-08-02 RX ORDER — SODIUM ZIRCONIUM CYCLOSILICATE 10 G/10G
10 POWDER, FOR SUSPENSION ORAL ONCE
Refills: 0 | Status: COMPLETED | OUTPATIENT
Start: 2021-08-02 | End: 2021-08-02

## 2021-08-02 RX ADMIN — TAMSULOSIN HYDROCHLORIDE 0.4 MILLIGRAM(S): 0.4 CAPSULE ORAL at 22:17

## 2021-08-02 RX ADMIN — HEPARIN SODIUM 5000 UNIT(S): 5000 INJECTION INTRAVENOUS; SUBCUTANEOUS at 03:08

## 2021-08-02 RX ADMIN — Medication 15 MILLILITER(S): at 05:42

## 2021-08-02 RX ADMIN — Medication 15 MILLILITER(S): at 14:33

## 2021-08-02 RX ADMIN — Medication 100 MILLIGRAM(S): at 05:40

## 2021-08-02 RX ADMIN — Medication 50 MILLILITER(S): at 13:02

## 2021-08-02 RX ADMIN — MYCOPHENOLATE MOFETIL 250 MILLIGRAM(S): 250 CAPSULE ORAL at 05:40

## 2021-08-02 RX ADMIN — PHENYLEPHRINE-SHARK LIVER OIL-MINERAL OIL-PETROLATUM RECTAL OINTMENT 1 APPLICATION(S): at 22:10

## 2021-08-02 RX ADMIN — Medication 5 MILLIGRAM(S): at 05:40

## 2021-08-02 RX ADMIN — Medication 15 MILLILITER(S): at 22:10

## 2021-08-02 RX ADMIN — Medication 60 MILLIGRAM(S): at 05:39

## 2021-08-02 RX ADMIN — HEPARIN SODIUM 5000 UNIT(S): 5000 INJECTION INTRAVENOUS; SUBCUTANEOUS at 22:09

## 2021-08-02 RX ADMIN — TACROLIMUS 7 MILLIGRAM(S): 5 CAPSULE ORAL at 13:03

## 2021-08-02 RX ADMIN — CEFEPIME 100 MILLIGRAM(S): 1 INJECTION, POWDER, FOR SOLUTION INTRAMUSCULAR; INTRAVENOUS at 13:28

## 2021-08-02 RX ADMIN — Medication 100 MILLIGRAM(S): at 13:02

## 2021-08-02 RX ADMIN — MYCOPHENOLATE MOFETIL 250 MILLIGRAM(S): 250 CAPSULE ORAL at 18:42

## 2021-08-02 RX ADMIN — SODIUM ZIRCONIUM CYCLOSILICATE 10 GRAM(S): 10 POWDER, FOR SUSPENSION ORAL at 13:02

## 2021-08-02 RX ADMIN — HEPARIN SODIUM 5000 UNIT(S): 5000 INJECTION INTRAVENOUS; SUBCUTANEOUS at 13:04

## 2021-08-02 RX ADMIN — Medication 2: at 13:18

## 2021-08-02 RX ADMIN — INSULIN HUMAN 10 UNIT(S): 100 INJECTION, SOLUTION SUBCUTANEOUS at 13:03

## 2021-08-02 NOTE — PROGRESS NOTE ADULT - SUBJECTIVE AND OBJECTIVE BOX
Subjective  No acute issues overnight. Denies fevers/chills. States he hasn't had much of an appetite lately. Only complaint is bothersome hemorrhoids       Objective    Vital signs  T(F): , Max: 98.9 (08-01-21 @ 19:23)  HR: 88 (08-02-21 @ 05:31)  BP: 125/60 (08-02-21 @ 05:31)  SpO2: 99% (08-02-21 @ 05:31)  Wt(kg): --    Output       Gen: NAD  Abd: soft, nontender, nondistended  : armenta secured in place, draining CYU    Labs      08-01 @ 21:33    WBC --    / Hct --    / SCr 4.63     08-01 @ 17:13    WBC --    / Hct --    / SCr 4.65           Urine Cx: pending

## 2021-08-02 NOTE — PROGRESS NOTE ADULT - PROBLEM SELECTOR PLAN 2
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 Has chronic ureteral stent as well which may be a nidus for infection.  1. Start cefepime 1g IV q24h (renal dosing). May need to adjust as creatinine improves.  2. F/U UCx.  3. Case discussed with urology PA - recommending IR evaluation if decision is made to remove or replace stent, as they placed stent initially  4. Trend CBC. 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 Has chronic ureteral stent as well which may be a nidus for infection.  -Started cefepime 1g IV q24h (renal dosing). May need to adjust as creatinine improves.  -F/U UCx, currently with 2 different GNR  -Case discussed with urology PA - recommending IR evaluation if decision is made to remove or replace stent, as they placed stent initially  -Trend CBC.

## 2021-08-02 NOTE — PROGRESS NOTE ADULT - PROBLEM SELECTOR PLAN 7
1. Continue flomax.  2. Urology f/u    DVT PPx; HSQ TID given renal insufficiency. -Continue flomax.  -Urology f/u    DVT PPx; HSQ TID given renal insufficiency.

## 2021-08-02 NOTE — PROGRESS NOTE ADULT - PROBLEM SELECTOR PLAN 6
1. Holding oral hypoglycemics.  2. Admelog insulin sliding scale coverage.  3. FS monitor TID and qhs.  4. Diabetic diet.  5. Check HbA1c in AM. -Holding oral hypoglycemics.  -Admelog insulin sliding scale coverage.  -FS monitor TID and qhs.  -Diabetic diet.  -HbA1c pending

## 2021-08-02 NOTE — PROGRESS NOTE ADULT - PROBLEM SELECTOR PLAN 1
with baseline creatinine in the 2-range, suspect CKD stage III as well.  Etiology most likely secondary to urinary retention. Alternate etiologies to consider would be outpatient bactrim use, urinary tract infection, hypovolemia in setting of reported diarrhea.    1. Maintain Velez catheter  2. Monitor Is and Os.  3. Repeat BMP this evening to monitor for post-obstructive diuresis.  4. Continue normal saline at 80 cc/hr through the rest of the day.  5. Avoid nephrotoxic medications.  6. Transplant nephrology f/u. with baseline creatinine in the 2-range, suspect CKD stage III as well.  Etiology most likely secondary to urinary retention. Alternate etiologies to consider would be outpatient bactrim use, urinary tract infection, hypovolemia in setting of reported diarrhea.    -Maintain Velez catheter  -Monitor Is and Os.  -Avoid nephrotoxic medications.  -Transplant nephrology f/u.

## 2021-08-02 NOTE — CHART NOTE - NSCHARTNOTEFT_GEN_A_CORE
Mr. 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    Plan  - Ordered GI PCR  - CBC, BMP, Mg, Phos  - Cefepime 1g (renally dosed)  - F/u Nephro and Uro recs  - f/u UCx  - Continue home meds:  mg PO BID, prednisone 5 mg PO daily, allopurinol  - Indwelling Veelz  - Strict I/Os  - DVT: SQ Hep

## 2021-08-02 NOTE — PROGRESS NOTE ADULT - PROBLEM SELECTOR PLAN 4
Unclear if this is fulminant diarrhea or constipation with overflow. Patients symptoms and presentation are atypical.  1. Let's see what presents here in the hospital. Hold off on adding any stool softners/laxatives/anti-diarrheals.  2. If no diarrhea, will consider addition of stool softeners.  3. If true diarrhea, will begin work-up for infectious causes, like C. diff and GI PCR testing  4. Monitor PO intake. Unclear if this is fulminant diarrhea or constipation with overflow. Patients symptoms and presentation are atypical.  -Hold off on adding any stool softners/laxatives/anti-diarrheals.  -If no diarrhea, will consider addition of stool softeners.  -If true diarrhea, will begin work-up for infectious causes, like C. diff and GI PCR testing  -Monitor PO intake.

## 2021-08-02 NOTE — PROGRESS NOTE ADULT - SUBJECTIVE AND OBJECTIVE BOX
[facility] Division of Hospital Medicine  Valentín Fritz DO  Pager (MEGGAN, 2Z-7V): [phone]  Other Times:  [phone]    Patient is a 70y old  Male who presents with a chief complaint of Urinary Retention (02 Aug 2021 11:06)      SUBJECTIVE / OVERNIGHT EVENTS:  ADDITIONAL REVIEW OF SYSTEMS:    MEDICATIONS  (STANDING):  allopurinol 100 milliGRAM(s) Oral daily  cefepime   IVPB 1000 milliGRAM(s) IV Intermittent every 24 hours  cefepime   IVPB      citric acid/sodium citrate Solution 15 milliLiter(s) Oral three times a day  dextrose 40% Gel 15 Gram(s) Oral once  dextrose 5%. 1000 milliLiter(s) (50 mL/Hr) IV Continuous <Continuous>  dextrose 5%. 1000 milliLiter(s) (100 mL/Hr) IV Continuous <Continuous>  dextrose 50% Injectable 50 milliLiter(s) IV Push once  dextrose 50% Injectable 25 Gram(s) IV Push once  dextrose 50% Injectable 12.5 Gram(s) IV Push once  dextrose 50% Injectable 25 Gram(s) IV Push once  glucagon  Injectable 1 milliGRAM(s) IntraMuscular once  hemorrhoidal Ointment 1 Application(s) Rectal every 8 hours  heparin   Injectable 5000 Unit(s) SubCutaneous every 8 hours  insulin lispro (ADMELOG) corrective regimen sliding scale   SubCutaneous three times a day before meals  insulin lispro (ADMELOG) corrective regimen sliding scale   SubCutaneous at bedtime  insulin regular  human recombinant 10 Unit(s) IV Push once  metoprolol succinate  milliGRAM(s) Oral daily  mycophenolate mofetil 250 milliGRAM(s) Oral two times a day  NIFEdipine XL 60 milliGRAM(s) Oral daily  polyethylene glycol 3350 17 Gram(s) Oral daily  predniSONE   Tablet 5 milliGRAM(s) Oral daily  sodium zirconium cyclosilicate 10 Gram(s) Oral once  tacrolimus ER Tablet (ENVARSUS XR) 7 milliGRAM(s) Oral daily  tamsulosin 0.4 milliGRAM(s) Oral at bedtime    MEDICATIONS  (PRN):  acetaminophen   Tablet .. 650 milliGRAM(s) Oral every 6 hours PRN Temp greater or equal to 38.5C (101.3F), Mild Pain (1 - 3)      CAPILLARY BLOOD GLUCOSE      POCT Blood Glucose.: 140 mg/dL (02 Aug 2021 08:29)  POCT Blood Glucose.: 103 mg/dL (01 Aug 2021 21:58)  POCT Blood Glucose.: 102 mg/dL (01 Aug 2021 17:03)    I&O's Summary      PHYSICAL EXAM:  Vital Signs Last 24 Hrs  T(C): 37 (02 Aug 2021 11:33), Max: 37.2 (01 Aug 2021 19:23)  T(F): 98.6 (02 Aug 2021 11:33), Max: 98.9 (01 Aug 2021 19:23)  HR: 80 (02 Aug 2021 11:33) (66 - 88)  BP: 98/56 (02 Aug 2021 11:33) (98/56 - 148/77)  BP(mean): --  RR: 18 (02 Aug 2021 11:33) (18 - 18)  SpO2: 99% (02 Aug 2021 11:33) (97% - 100%)  CONSTITUTIONAL: NAD, well-developed, well-groomed  EYES: PERRLA; conjunctiva and sclera clear  ENMT: Moist oral mucosa, no pharyngeal injection or exudates; normal dentition  NECK: Supple, no palpable masses; no thyromegaly  RESPIRATORY: Normal respiratory effort; lungs are clear to auscultation bilaterally  CARDIOVASCULAR: Regular rate and rhythm, normal S1 and S2, no murmur/rub/gallop; No lower extremity edema; Peripheral pulses are 2+ bilaterally  ABDOMEN: Nontender to palpation, normoactive bowel sounds, no rebound/guarding; No hepatosplenomegaly  MUSCULOSKELETAL:  Normal gait; no clubbing or cyanosis of digits; no joint swelling or tenderness to palpation  PSYCH: A+O to person, place, and time; affect appropriate  NEUROLOGY: CN 2-12 are intact and symmetric; no gross sensory deficits   SKIN: No rashes; no palpable lesions    LABS:                        9.5    7.00  )-----------( 209      ( 02 Aug 2021 10:59 )             30.9     08-02    135  |  104  |  43<H>  ----------------------------<  213<H>  5.5<H>   |  21<L>  |  3.93<H>    Ca    9.7      02 Aug 2021 10:59  Phos  3.6     08-  Mg     2.0     08-    TPro  7.5  /  Alb  4.0  /  TBili  0.2  /  DBili  x   /  AST  23  /  ALT  22  /  AlkPhos  81            Urinalysis Basic - ( 01 Aug 2021 06:31 )    Color: Yellow / Appearance: Slightly Turbid / S.015 / pH: x  Gluc: x / Ketone: Negative  / Bili: Negative / Urobili: Negative   Blood: x / Protein: 300 mg/dL / Nitrite: Positive   Leuk Esterase: Large / RBC: 8 /hpf /  /HPF   Sq Epi: x / Non Sq Epi: 1 /hpf / Bacteria: Negative        Culture - Urine (collected 01 Aug 2021 09:37)  Source: Clean Catch Clean Catch (Midstream)  Preliminary Report (02 Aug 2021 11:12):    >100,000 CFU/ml Gram Negative Rods #2    >100,000 CFU/ml Gram Negative Rods        RADIOLOGY & ADDITIONAL TESTS:  Results Reviewed:   Imaging Personally Reviewed:  Electrocardiogram Personally Reviewed:    COORDINATION OF CARE:  Care Discussed with Consultants/Other Providers [Y/N]:  Prior or Outpatient Records Reviewed [Y/N]:   [facility] Division of Hospital Medicine  Valentín ByronDO derrick  Pager (MEGGAN, 4G-7V): [phone]  Other Times:  [phone]    Patient is a 70y old  Male who presents with a chief complaint of Urinary Retention (02 Aug 2021 11:06)    SUBJECTIVE / OVERNIGHT EVENTS: No acute events overnight. Patient seen and examined at bedside this morning, complaining of hemorrhoidal pain but otherwise denies acute complaints.    REVIEW OF SYSTEMS:    CONSTITUTIONAL: No weakness, fevers or chills  EYES/ENT: No visual changes;  No vertigo or throat pain   NECK: No pain or stiffness  RESPIRATORY: No cough, wheezing, hemoptysis; No shortness of breath  CARDIOVASCULAR: No chest pain or palpitations  GASTROINTESTINAL: No abdominal or epigastric pain. No nausea, vomiting, or hematemesis; No diarrhea or constipation. No melena or hematochezia. Does endorse rectal pain from hemorhoids  GENITOURINARY: No dysuria, frequency or hematuria  NEUROLOGICAL: No numbness or weakness  SKIN: No itching, burning, rashes, or lesions  MSK: No joint pain, no back pain  HEME: No easy bleeding, no easy bruising  All other review of systems is negative unless indicated above.    MEDICATIONS  (STANDING):  allopurinol 100 milliGRAM(s) Oral daily  cefepime   IVPB 1000 milliGRAM(s) IV Intermittent every 24 hours  cefepime   IVPB      citric acid/sodium citrate Solution 15 milliLiter(s) Oral three times a day  dextrose 40% Gel 15 Gram(s) Oral once  dextrose 5%. 1000 milliLiter(s) (50 mL/Hr) IV Continuous <Continuous>  dextrose 5%. 1000 milliLiter(s) (100 mL/Hr) IV Continuous <Continuous>  dextrose 50% Injectable 50 milliLiter(s) IV Push once  dextrose 50% Injectable 25 Gram(s) IV Push once  dextrose 50% Injectable 12.5 Gram(s) IV Push once  dextrose 50% Injectable 25 Gram(s) IV Push once  glucagon  Injectable 1 milliGRAM(s) IntraMuscular once  hemorrhoidal Ointment 1 Application(s) Rectal every 8 hours  heparin   Injectable 5000 Unit(s) SubCutaneous every 8 hours  insulin lispro (ADMELOG) corrective regimen sliding scale   SubCutaneous three times a day before meals  insulin lispro (ADMELOG) corrective regimen sliding scale   SubCutaneous at bedtime  insulin regular  human recombinant 10 Unit(s) IV Push once  metoprolol succinate  milliGRAM(s) Oral daily  mycophenolate mofetil 250 milliGRAM(s) Oral two times a day  NIFEdipine XL 60 milliGRAM(s) Oral daily  polyethylene glycol 3350 17 Gram(s) Oral daily  predniSONE   Tablet 5 milliGRAM(s) Oral daily  sodium zirconium cyclosilicate 10 Gram(s) Oral once  tacrolimus ER Tablet (ENVARSUS XR) 7 milliGRAM(s) Oral daily  tamsulosin 0.4 milliGRAM(s) Oral at bedtime    MEDICATIONS  (PRN):  acetaminophen   Tablet .. 650 milliGRAM(s) Oral every 6 hours PRN Temp greater or equal to 38.5C (101.3F), Mild Pain (1 - 3)      CAPILLARY BLOOD GLUCOSE      POCT Blood Glucose.: 140 mg/dL (02 Aug 2021 08:29)  POCT Blood Glucose.: 103 mg/dL (01 Aug 2021 21:58)  POCT Blood Glucose.: 102 mg/dL (01 Aug 2021 17:03)    I&O's Summary      PHYSICAL EXAM:  Vital Signs Last 24 Hrs  T(C): 37 (02 Aug 2021 11:33), Max: 37.2 (01 Aug 2021 19:23)  T(F): 98.6 (02 Aug 2021 11:33), Max: 98.9 (01 Aug 2021 19:23)  HR: 80 (02 Aug 2021 11:33) (66 - 88)  BP: 98/56 (02 Aug 2021 11:33) (98/56 - 148/77)  BP(mean): --  RR: 18 (02 Aug 2021 11:33) (18 - 18)  SpO2: 99% (02 Aug 2021 11:33) (97% - 100%)    CONSTITUTIONAL: NAD, well-developed, well-groomed  EYES: EOMI; conjunctiva and sclera clear  ENMT: Moist oral mucosa, no pharyngeal injection or exudates; normal dentition  RESPIRATORY: Normal respiratory effort; lungs are clear to auscultation bilaterally  CARDIOVASCULAR: Regular rate and rhythm, normal S1 and S2, no murmur/rub/gallop; No lower extremity edema  ABDOMEN: Nontender to palpation, normoactive bowel sounds, no rebound/guarding  MUSCULOSKELETAL: No clubbing or cyanosis of digits; no joint swelling or tenderness to palpation  GENITOURINARY: Velez draining clear yellow urine  PSYCH: A+O to person, place, and time; affect appropriate  NEUROLOGY: CN 2-12 are intact and symmetric; no gross sensory deficits   SKIN: No rashes; LUE with old AVF    LABS:                        9.5    7.00  )-----------( 209      ( 02 Aug 2021 10:59 )             30.9     08-02    135  |  104  |  43<H>  ----------------------------<  213<H>  5.5<H>   |  21<L>  |  3.93<H>    Ca    9.7      02 Aug 2021 10:59  Phos  3.6     08-  Mg     2.0     08-    TPro  7.5  /  Alb  4.0  /  TBili  0.2  /  DBili  x   /  AST  23  /  ALT  22  /  AlkPhos  81  08-          Urinalysis Basic - ( 01 Aug 2021 06:31 )    Color: Yellow / Appearance: Slightly Turbid / S.015 / pH: x  Gluc: x / Ketone: Negative  / Bili: Negative / Urobili: Negative   Blood: x / Protein: 300 mg/dL / Nitrite: Positive   Leuk Esterase: Large / RBC: 8 /hpf /  /HPF   Sq Epi: x / Non Sq Epi: 1 /hpf / Bacteria: Negative        Culture - Urine (collected 01 Aug 2021 09:37)  Source: Clean Catch Clean Catch (Midstream)  Preliminary Report (02 Aug 2021 11:12):    >100,000 CFU/ml Gram Negative Rods #2    >100,000 CFU/ml Gram Negative Rods

## 2021-08-02 NOTE — PROGRESS NOTE ADULT - PROBLEM SELECTOR PLAN 5
1. Continue nifedipine and metoprolol at home doses. -Continue nifedipine and metoprolol at home doses.

## 2021-08-02 NOTE — PROGRESS NOTE ADULT - PROBLEM SELECTOR PLAN 3
1. Resume home dose of envarsus 7 mg PO daily.  2. Resume home dose of  mg PO BID.  3. Resume home dose of prednisone 5 mg PO daily.  4. Will discuss with renal team the utility of resuming PCP PPx (likely atovaquone), which patient states he stopped taking at home.  5. 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 Continue allopurinol. -Resume home dose of envarsus 7 mg PO daily.  -Resume home dose of  mg PO BID.  -Resume home dose of prednisone 5 mg PO daily.  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 Continue allopurinol.  -Velez up any further transplant renal recs

## 2021-08-02 NOTE — PROGRESS NOTE ADULT - SUBJECTIVE AND OBJECTIVE BOX
[facility] DIVISION OF KIDNEY DISEASES AND HYPERTENSION -- FOLLOW UP NOTE  --------------------------------------------------------------------------------    24 hour events/subjective: Patient was seen and examined at bedside. Reported feeling well. Denies CP, SOB, fever, chills, nausea, vomiting, diarrhea, LE edema.    PAST HISTORY  --------------------------------------------------------------------------------  No significant changes to PMH, PSH, FHx, SHx, unless otherwise noted    ALLERGIES & MEDICATIONS  --------------------------------------------------------------------------------  Allergies    No Known Allergies    Intolerances    Standing Inpatient Medications  allopurinol 100 milliGRAM(s) Oral daily  cefepime   IVPB 1000 milliGRAM(s) IV Intermittent every 24 hours  cefepime   IVPB      citric acid/sodium citrate Solution 15 milliLiter(s) Oral three times a day  dextrose 40% Gel 15 Gram(s) Oral once  dextrose 5%. 1000 milliLiter(s) IV Continuous <Continuous>  dextrose 5%. 1000 milliLiter(s) IV Continuous <Continuous>  dextrose 50% Injectable 25 Gram(s) IV Push once  dextrose 50% Injectable 12.5 Gram(s) IV Push once  dextrose 50% Injectable 25 Gram(s) IV Push once  glucagon  Injectable 1 milliGRAM(s) IntraMuscular once  heparin   Injectable 5000 Unit(s) SubCutaneous every 8 hours  insulin lispro (ADMELOG) corrective regimen sliding scale   SubCutaneous three times a day before meals  insulin lispro (ADMELOG) corrective regimen sliding scale   SubCutaneous at bedtime  metoprolol succinate  milliGRAM(s) Oral daily  mycophenolate mofetil 250 milliGRAM(s) Oral two times a day  NIFEdipine XL 60 milliGRAM(s) Oral daily  predniSONE   Tablet 5 milliGRAM(s) Oral daily  sodium chloride 0.9%. 1000 milliLiter(s) IV Continuous <Continuous>  tacrolimus ER Tablet (ENVARSUS XR) 7 milliGRAM(s) Oral daily  tamsulosin 0.4 milliGRAM(s) Oral at bedtime    PRN Inpatient Medications  acetaminophen   Tablet .. 650 milliGRAM(s) Oral every 6 hours PRN    REVIEW OF SYSTEMS  --------------------------------------------------------------------------------  Gen: No fevers/chills  Respiratory: No dyspnea, cough  CV: No chest pain  GI: No abdominal pain, diarrhea  MSK: No  edema    All other systems were reviewed and are negative, except as noted.    VITALS/PHYSICAL EXAM  --------------------------------------------------------------------------------  T(C): 36.4 (08-02-21 @ 05:31), Max: 37.2 (08-01-21 @ 19:23)  HR: 88 (08-02-21 @ 05:31) (66 - 88)  BP: 125/60 (08-02-21 @ 05:31) (125/60 - 148/77)  RR: 18 (08-02-21 @ 05:31) (18 - 18)  SpO2: 99% (08-02-21 @ 05:31) (97% - 100%)  Wt(kg): --  Height (cm): 179.1 (08-01-21 @ 04:44)  Weight (kg): 94.3 (08-01-21 @ 04:44)  BMI (kg/m2): 29.4 (08-01-21 @ 04:44)  BSA (m2): 2.13 (08-01-21 @ 04:44)    Physical Exam:  	Gen: NAD  	HEENT: MMM  	Pulm: CTA B/L  	CV: S1S2  	Abd: Soft, +BS   	Ext: No LE edema B/L  	Neuro: Awake  	Skin: Warm and dry  	: armenta catheter with clear yellow urine       LABS/STUDIES  --------------------------------------------------------------------------------              9.5    7.00  >-----------<  209      [08-02-21 @ 10:59]              30.9     137  |  105  |  48  ----------------------------<  116      [08-01-21 @ 21:33]  4.9   |  22  |  4.63        Ca     9.6     [08-01-21 @ 21:33]      Mg     2.0     [08-01-21 @ 17:13]      Phos  3.6     [08-01-21 @ 17:13]    TPro  7.5  /  Alb  4.0  /  TBili  0.2  /  DBili  x   /  AST  23  /  ALT  22  /  AlkPhos  81  [08-01-21 @ 06:31]          Creatinine Trend:  SCr 4.63 [08-01 @ 21:33]  SCr 4.65 [08-01 @ 17:13]  SCr 5.34 [08-01 @ 06:31]    Urinalysis - [08-01-21 @ 06:31]      Color Yellow / Appearance Slightly Turbid / SG 1.015 / pH 6.5      Gluc Negative / Ketone Negative  / Bili Negative / Urobili Negative       Blood Small / Protein 300 mg/dL / Leuk Est Large / Nitrite Positive      RBC 8 /  / Hyaline 0 / Gran  / Sq Epi  / Non Sq Epi 1 / Bacteria Negative      Iron 75, TIBC 278, %sat 27      [06-02-21 @ 16:51]  Ferritin 450      [06-02-21 @ 17:06]  PTH -- (Ca 10.3)      [06-04-21 @ 09:37]   22  PTH -- (Ca --)      [06-02-21 @ 17:06]   15  Vitamin D (25OH) 37.0      [06-04-21 @ 09:37]  HbA1c 6.0      [08-09-19 @ 23:48]

## 2021-08-03 ENCOUNTER — NON-APPOINTMENT (OUTPATIENT)
Age: 71
End: 2021-08-03

## 2021-08-03 LAB
-  AMIKACIN: SIGNIFICANT CHANGE UP
-  AMIKACIN: SIGNIFICANT CHANGE UP
-  AZTREONAM: SIGNIFICANT CHANGE UP
-  AZTREONAM: SIGNIFICANT CHANGE UP
-  CEFEPIME: SIGNIFICANT CHANGE UP
-  CEFEPIME: SIGNIFICANT CHANGE UP
-  CEFTAZIDIME: SIGNIFICANT CHANGE UP
-  CEFTAZIDIME: SIGNIFICANT CHANGE UP
-  CIPROFLOXACIN: SIGNIFICANT CHANGE UP
-  CIPROFLOXACIN: SIGNIFICANT CHANGE UP
-  GENTAMICIN: SIGNIFICANT CHANGE UP
-  GENTAMICIN: SIGNIFICANT CHANGE UP
-  IMIPENEM: SIGNIFICANT CHANGE UP
-  IMIPENEM: SIGNIFICANT CHANGE UP
-  LEVOFLOXACIN: SIGNIFICANT CHANGE UP
-  LEVOFLOXACIN: SIGNIFICANT CHANGE UP
-  MEROPENEM: SIGNIFICANT CHANGE UP
-  MEROPENEM: SIGNIFICANT CHANGE UP
-  PIPERACILLIN/TAZOBACTAM: SIGNIFICANT CHANGE UP
-  PIPERACILLIN/TAZOBACTAM: SIGNIFICANT CHANGE UP
-  TOBRAMYCIN: SIGNIFICANT CHANGE UP
-  TOBRAMYCIN: SIGNIFICANT CHANGE UP
A1C WITH ESTIMATED AVERAGE GLUCOSE RESULT: 6.6 % — HIGH (ref 4–5.6)
ANION GAP SERPL CALC-SCNC: 12 MMOL/L — SIGNIFICANT CHANGE UP (ref 5–17)
BASOPHILS # BLD AUTO: 0.03 K/UL — SIGNIFICANT CHANGE UP (ref 0–0.2)
BASOPHILS NFR BLD AUTO: 0.4 % — SIGNIFICANT CHANGE UP (ref 0–2)
BUN SERPL-MCNC: 41 MG/DL — HIGH (ref 7–23)
CALCIUM SERPL-MCNC: 9.5 MG/DL — SIGNIFICANT CHANGE UP (ref 8.4–10.5)
CHLORIDE SERPL-SCNC: 105 MMOL/L — SIGNIFICANT CHANGE UP (ref 96–108)
CO2 SERPL-SCNC: 20 MMOL/L — LOW (ref 22–31)
CREAT SERPL-MCNC: 3.77 MG/DL — HIGH (ref 0.5–1.3)
CULTURE RESULTS: SIGNIFICANT CHANGE UP
CULTURE RESULTS: SIGNIFICANT CHANGE UP
EOSINOPHIL # BLD AUTO: 0.16 K/UL — SIGNIFICANT CHANGE UP (ref 0–0.5)
EOSINOPHIL NFR BLD AUTO: 2.3 % — SIGNIFICANT CHANGE UP (ref 0–6)
ESTIMATED AVERAGE GLUCOSE: 143 MG/DL — HIGH (ref 68–114)
GLUCOSE BLDC GLUCOMTR-MCNC: 149 MG/DL — HIGH (ref 70–99)
GLUCOSE BLDC GLUCOMTR-MCNC: 161 MG/DL — HIGH (ref 70–99)
GLUCOSE BLDC GLUCOMTR-MCNC: 171 MG/DL — HIGH (ref 70–99)
GLUCOSE BLDC GLUCOMTR-MCNC: 205 MG/DL — HIGH (ref 70–99)
GLUCOSE SERPL-MCNC: 162 MG/DL — HIGH (ref 70–99)
HCT VFR BLD CALC: 30.3 % — LOW (ref 39–50)
HGB BLD-MCNC: 9.1 G/DL — LOW (ref 13–17)
IMM GRANULOCYTES NFR BLD AUTO: 1.6 % — HIGH (ref 0–1.5)
LYMPHOCYTES # BLD AUTO: 2.17 K/UL — SIGNIFICANT CHANGE UP (ref 1–3.3)
LYMPHOCYTES # BLD AUTO: 31.8 % — SIGNIFICANT CHANGE UP (ref 13–44)
MCHC RBC-ENTMCNC: 29.3 PG — SIGNIFICANT CHANGE UP (ref 27–34)
MCHC RBC-ENTMCNC: 30 GM/DL — LOW (ref 32–36)
MCV RBC AUTO: 97.4 FL — SIGNIFICANT CHANGE UP (ref 80–100)
METHOD TYPE: SIGNIFICANT CHANGE UP
METHOD TYPE: SIGNIFICANT CHANGE UP
MONOCYTES # BLD AUTO: 0.58 K/UL — SIGNIFICANT CHANGE UP (ref 0–0.9)
MONOCYTES NFR BLD AUTO: 8.5 % — SIGNIFICANT CHANGE UP (ref 2–14)
NEUTROPHILS # BLD AUTO: 3.77 K/UL — SIGNIFICANT CHANGE UP (ref 1.8–7.4)
NEUTROPHILS NFR BLD AUTO: 55.4 % — SIGNIFICANT CHANGE UP (ref 43–77)
NRBC # BLD: 0 /100 WBCS — SIGNIFICANT CHANGE UP (ref 0–0)
ORGANISM # SPEC MICROSCOPIC CNT: SIGNIFICANT CHANGE UP
PHOSPHATE SERPL-MCNC: 3.3 MG/DL — SIGNIFICANT CHANGE UP (ref 2.5–4.5)
PLATELET # BLD AUTO: 221 K/UL — SIGNIFICANT CHANGE UP (ref 150–400)
POTASSIUM SERPL-MCNC: 4.6 MMOL/L — SIGNIFICANT CHANGE UP (ref 3.5–5.3)
POTASSIUM SERPL-SCNC: 4.6 MMOL/L — SIGNIFICANT CHANGE UP (ref 3.5–5.3)
RBC # BLD: 3.11 M/UL — LOW (ref 4.2–5.8)
RBC # FLD: 15.1 % — HIGH (ref 10.3–14.5)
SODIUM SERPL-SCNC: 137 MMOL/L — SIGNIFICANT CHANGE UP (ref 135–145)
SPECIMEN SOURCE: SIGNIFICANT CHANGE UP
SPECIMEN SOURCE: SIGNIFICANT CHANGE UP
TACROLIMUS SERPL-MCNC: 10.8 NG/ML — SIGNIFICANT CHANGE UP
TACROLIMUS SERPL-MCNC: 6.6 NG/ML — SIGNIFICANT CHANGE UP
WBC # BLD: 6.82 K/UL — SIGNIFICANT CHANGE UP (ref 3.8–10.5)
WBC # FLD AUTO: 6.82 K/UL — SIGNIFICANT CHANGE UP (ref 3.8–10.5)

## 2021-08-03 PROCEDURE — 99232 SBSQ HOSP IP/OBS MODERATE 35: CPT | Mod: GC

## 2021-08-03 PROCEDURE — 99233 SBSQ HOSP IP/OBS HIGH 50: CPT | Mod: GC

## 2021-08-03 RX ORDER — POLYETHYLENE GLYCOL 3350 17 G/17G
17 POWDER, FOR SOLUTION ORAL DAILY
Refills: 0 | Status: DISCONTINUED | OUTPATIENT
Start: 2021-08-03 | End: 2021-08-05

## 2021-08-03 RX ADMIN — TACROLIMUS 7 MILLIGRAM(S): 5 CAPSULE ORAL at 05:15

## 2021-08-03 RX ADMIN — Medication 5 MILLIGRAM(S): at 05:14

## 2021-08-03 RX ADMIN — Medication 15 MILLILITER(S): at 05:16

## 2021-08-03 RX ADMIN — Medication 60 MILLIGRAM(S): at 05:14

## 2021-08-03 RX ADMIN — Medication 650 MILLIGRAM(S): at 20:55

## 2021-08-03 RX ADMIN — Medication 650 MILLIGRAM(S): at 18:47

## 2021-08-03 RX ADMIN — MYCOPHENOLATE MOFETIL 250 MILLIGRAM(S): 250 CAPSULE ORAL at 17:31

## 2021-08-03 RX ADMIN — Medication 2: at 13:14

## 2021-08-03 RX ADMIN — Medication 1: at 09:12

## 2021-08-03 RX ADMIN — Medication 100 MILLIGRAM(S): at 05:14

## 2021-08-03 RX ADMIN — MYCOPHENOLATE MOFETIL 250 MILLIGRAM(S): 250 CAPSULE ORAL at 05:15

## 2021-08-03 RX ADMIN — HEPARIN SODIUM 5000 UNIT(S): 5000 INJECTION INTRAVENOUS; SUBCUTANEOUS at 13:14

## 2021-08-03 RX ADMIN — HEPARIN SODIUM 5000 UNIT(S): 5000 INJECTION INTRAVENOUS; SUBCUTANEOUS at 22:23

## 2021-08-03 RX ADMIN — TAMSULOSIN HYDROCHLORIDE 0.4 MILLIGRAM(S): 0.4 CAPSULE ORAL at 22:23

## 2021-08-03 RX ADMIN — HEPARIN SODIUM 5000 UNIT(S): 5000 INJECTION INTRAVENOUS; SUBCUTANEOUS at 05:14

## 2021-08-03 RX ADMIN — Medication 15 MILLILITER(S): at 13:14

## 2021-08-03 RX ADMIN — Medication 15 MILLILITER(S): at 22:23

## 2021-08-03 RX ADMIN — CEFEPIME 100 MILLIGRAM(S): 1 INJECTION, POWDER, FOR SOLUTION INTRAMUSCULAR; INTRAVENOUS at 13:13

## 2021-08-03 RX ADMIN — Medication 100 MILLIGRAM(S): at 13:13

## 2021-08-03 RX ADMIN — PHENYLEPHRINE-SHARK LIVER OIL-MINERAL OIL-PETROLATUM RECTAL OINTMENT 1 APPLICATION(S): at 22:24

## 2021-08-03 NOTE — PROGRESS NOTE ADULT - SUBJECTIVE AND OBJECTIVE BOX
PROGRESS NOTE:   Authored by Dr. Ketan Kraft pager [facility]: [phone]    Patient is a 70y old  Male who presents with a chief complaint of Urinary Retention (02 Aug 2021 11:58)      SUBJECTIVE / OVERNIGHT EVENTS: No overnight events. Patient reported feeling, having good apettite and getting off the bed. He reported 3 watery BM. Denied n/v/d, chest pain, SOB, abdominal pain.    ADDITIONAL REVIEW OF SYSTEMS:  Review of Systems:  Constitutional: No fever, No weight loss, good appetite/po intake  Head: No headache   Eyes: No blurry vision, No diplopia  Neuro: No tremors, No muscle weakness   Cardiovascular: No chest pain, No palpitations  Respiratory: No SOB, No cough  GI: No nausea, No vomiting, No diarrhea  : No dysuria, No hematuria  Skin: No rash  MSK: No joint pain   Psych: No depression      MEDICATIONS  (STANDING):  allopurinol 100 milliGRAM(s) Oral daily  cefepime   IVPB 1000 milliGRAM(s) IV Intermittent every 24 hours  cefepime   IVPB      citric acid/sodium citrate Solution 15 milliLiter(s) Oral three times a day  dextrose 40% Gel 15 Gram(s) Oral once  dextrose 5%. 1000 milliLiter(s) (50 mL/Hr) IV Continuous <Continuous>  dextrose 5%. 1000 milliLiter(s) (100 mL/Hr) IV Continuous <Continuous>  dextrose 50% Injectable 25 Gram(s) IV Push once  dextrose 50% Injectable 25 Gram(s) IV Push once  dextrose 50% Injectable 12.5 Gram(s) IV Push once  glucagon  Injectable 1 milliGRAM(s) IntraMuscular once  hemorrhoidal Ointment 1 Application(s) Rectal every 8 hours  heparin   Injectable 5000 Unit(s) SubCutaneous every 8 hours  insulin lispro (ADMELOG) corrective regimen sliding scale   SubCutaneous three times a day before meals  insulin lispro (ADMELOG) corrective regimen sliding scale   SubCutaneous at bedtime  metoprolol succinate  milliGRAM(s) Oral daily  mycophenolate mofetil 250 milliGRAM(s) Oral two times a day  NIFEdipine XL 60 milliGRAM(s) Oral daily  predniSONE   Tablet 5 milliGRAM(s) Oral daily  tacrolimus ER Tablet (ENVARSUS XR) 7 milliGRAM(s) Oral daily  tamsulosin 0.4 milliGRAM(s) Oral at bedtime    MEDICATIONS  (PRN):  acetaminophen   Tablet .. 650 milliGRAM(s) Oral every 6 hours PRN Temp greater or equal to 38.5C (101.3F), Mild Pain (1 - 3)      CAPILLARY BLOOD GLUCOSE      POCT Blood Glucose.: 171 mg/dL (03 Aug 2021 08:40)  POCT Blood Glucose.: 157 mg/dL (02 Aug 2021 21:49)  POCT Blood Glucose.: 144 mg/dL (02 Aug 2021 17:09)  POCT Blood Glucose.: 209 mg/dL (02 Aug 2021 12:59)    I&O's Summary    02 Aug 2021 07:01  -  03 Aug 2021 07:00  --------------------------------------------------------  IN: 0 mL / OUT: 775 mL / NET: -775 mL    03 Aug 2021 07:01  -  03 Aug 2021 12:02  --------------------------------------------------------  IN: 0 mL / OUT: 950 mL / NET: -950 mL        PHYSICAL EXAM:  Vital Signs Last 24 Hrs  T(C): 36.3 (03 Aug 2021 05:57), Max: 36.8 (02 Aug 2021 14:20)  T(F): 97.4 (03 Aug 2021 05:57), Max: 98.2 (02 Aug 2021 14:20)  HR: 71 (03 Aug 2021 05:57) (71 - 78)  BP: 153/70 (03 Aug 2021 05:57) (120/61 - 153/70)  BP(mean): --  RR: 18 (03 Aug 2021 05:57) (16 - 18)  SpO2: 99% (03 Aug 2021 05:57) (97% - 99%)  GENERAL: NAD, lying comfortably in bed  HEAD: Atraumatic, normocephalic  EYES: EOMI b/l PERRLA b/l, conjunctiva and sclera clear  NECK: Supple, No JVD, No LAD  RESPIRATORY: Normal respiratory effort; lungs are clear to auscultation bilaterally  CARDIOVASCULAR: Regular rate and rhythm, normal S1 and S2, no murmur/rub/gallop; No lower extremity edema; Peripheral pulses are 2+ bilaterally  ABDOMEN: Nontender to palpation, normoactive bowel sounds, no rebound/guarding; No hepatosplenomegaly  MUSCLOSKELETAL: no clubbing or cyanosis of digits; no joint swelling or tenderness to palpation  NEURO: Non focal   PSYCH: A+O to person, place, and time; affect appropriate    LABS:                        9.1    6.82  )-----------( 221      ( 03 Aug 2021 06:10 )             30.3     08-03    137  |  105  |  41<H>  ----------------------------<  162<H>  4.6   |  20<L>  |  3.77<H>    Ca    9.5      03 Aug 2021 06:08  Phos  3.3     08-03  Mg     1.9     08-02    TPro  6.5  /  Alb  3.5  /  TBili  0.2  /  DBili  x   /  AST  17  /  ALT  15  /  AlkPhos  66  08-02              GI PCR Panel, Stool (collected 02 Aug 2021 22:32)  Source: .Stool Feces  Final Report (03 Aug 2021 02:24):    GI PCR Results: NOT detected    *******Please Note:*******    GI panel PCR evaluates for:    Campylobacter, Plesiomonas shigelloides, Salmonella,    Vibrio, Yersinia enterocolitica, Enteroaggregative    Escherichia coli (EAEC), Enteropathogenic E.coli (EPEC),    Enterotoxigenic E. coli (ETEC) lt/st, Shiga-like    toxin-producing E. coli (STEC) stx1/stx2,    Shigella/ Enteroinvasive E. coli (EIEC), Cryptosporidium,    Cyclospora cayetanensis, Entamoeba histolytica,    Giardia lamblia, Adenovirus F 40/41, Astrovirus,    Norovirus GI/GII, Rotavirus A, Sapovirus    Culture - Urine (collected 01 Aug 2021 09:37)  Source: Clean Catch Clean Catch (Midstream)  Preliminary Report (02 Aug 2021 20:50):    >100,000 CFU/ml Pseudomonas aeruginosa        Tele Reviewed:    RADIOLOGY & ADDITIONAL TESTS:  Results Reviewed:   Imaging Personally Reviewed:  Electrocardiogram Personally Reviewed:    COORDINATION OF CARE:  Care Discussed with Consultants/Other Providers [Y/N]:  Prior or Outpatient Records Reviewed [Y/N]:

## 2021-08-03 NOTE — PROGRESS NOTE ADULT - PROBLEM SELECTOR PLAN 6
-Holding oral hypoglycemics.  -Admelog insulin sliding scale coverage.  -FS monitor TID and qhs.  -Diabetic diet.  -HbA1c pending

## 2021-08-03 NOTE — PROGRESS NOTE ADULT - PROBLEM SELECTOR PLAN 1
with baseline creatinine in the 2-range, suspect CKD stage III as well.  Etiology most likely secondary to urinary retention. Alternate etiologies to consider would be outpatient bactrim use, urinary tract infection, hypovolemia in setting of reported diarrhea.    -Maintain Velez catheter  -Monitor Is and Os.  -Avoid nephrotoxic medications.  -Transplant nephrology f/u. with baseline creatinine in the 2-range, suspect CKD stage III as well.  Etiology most likely secondary to urinary retention. Alternate etiologies to consider would be outpatient bactrim use, urinary tract infection, hypovolemia in setting of reported diarrhea.  - TOV today  -Monitor Is and Os.  -Avoid nephrotoxic medications.  -Transplant nephrology f/u.

## 2021-08-03 NOTE — PROGRESS NOTE ADULT - SUBJECTIVE AND OBJECTIVE BOX
[facility] DIVISION OF KIDNEY DISEASES AND HYPERTENSION -- FOLLOW UP NOTE  --------------------------------------------------------------------------------    24 hour events/subjective: urine output 775 cc over 24h period. Patient was seen and examined at bedside. Reported feeling well. Denies CP, SOB, fever, chills, nausea, vomiting, diarrhea, LE edema.    PAST HISTORY  --------------------------------------------------------------------------------  No significant changes to PMH, PSH, FHx, SHx, unless otherwise noted    ALLERGIES & MEDICATIONS  --------------------------------------------------------------------------------  Allergies    No Known Allergies    Intolerances    Standing Inpatient Medications  allopurinol 100 milliGRAM(s) Oral daily  cefepime   IVPB 1000 milliGRAM(s) IV Intermittent every 24 hours  cefepime   IVPB      citric acid/sodium citrate Solution 15 milliLiter(s) Oral three times a day  dextrose 40% Gel 15 Gram(s) Oral once  dextrose 5%. 1000 milliLiter(s) IV Continuous <Continuous>  dextrose 5%. 1000 milliLiter(s) IV Continuous <Continuous>  dextrose 50% Injectable 25 Gram(s) IV Push once  dextrose 50% Injectable 25 Gram(s) IV Push once  dextrose 50% Injectable 12.5 Gram(s) IV Push once  glucagon  Injectable 1 milliGRAM(s) IntraMuscular once  hemorrhoidal Ointment 1 Application(s) Rectal every 8 hours  heparin   Injectable 5000 Unit(s) SubCutaneous every 8 hours  insulin lispro (ADMELOG) corrective regimen sliding scale   SubCutaneous three times a day before meals  insulin lispro (ADMELOG) corrective regimen sliding scale   SubCutaneous at bedtime  metoprolol succinate  milliGRAM(s) Oral daily  mycophenolate mofetil 250 milliGRAM(s) Oral two times a day  NIFEdipine XL 60 milliGRAM(s) Oral daily  predniSONE   Tablet 5 milliGRAM(s) Oral daily  tacrolimus ER Tablet (ENVARSUS XR) 7 milliGRAM(s) Oral daily  tamsulosin 0.4 milliGRAM(s) Oral at bedtime    PRN Inpatient Medications  acetaminophen   Tablet .. 650 milliGRAM(s) Oral every 6 hours PRN    REVIEW OF SYSTEMS  --------------------------------------------------------------------------------  Gen: No fevers/chills  Respiratory: No dyspnea, cough  CV: No chest pain  GI: No abdominal pain, diarrhea  : No hematuria  MSK: No  edema    All other systems were reviewed and are negative, except as noted.    VITALS/PHYSICAL EXAM  --------------------------------------------------------------------------------  T(C): 36.3 (08-03-21 @ 05:57), Max: 36.8 (08-02-21 @ 14:20)  HR: 71 (08-03-21 @ 05:57) (71 - 78)  BP: 153/70 (08-03-21 @ 05:57) (120/61 - 153/70)  RR: 18 (08-03-21 @ 05:57) (16 - 18)  SpO2: 99% (08-03-21 @ 05:57) (97% - 99%)  Wt(kg): --    08-02-21 @ 07:01  -  08-03-21 @ 07:00  --------------------------------------------------------  IN: 0 mL / OUT: 775 mL / NET: -775 mL    08-03-21 @ 07:01  -  08-03-21 @ 12:14  --------------------------------------------------------  IN: 0 mL / OUT: 950 mL / NET: -950 mL    Physical Exam:  	Gen: NAD  	HEENT: MMM  	Pulm: CTA B/L, no crackles   	CV: S1S2  	Abd: Soft, +BS   	Ext: No LE edema B/L  	Neuro: Awake  	Skin: Warm and dry  	: clear yellow urine       LABS/STUDIES  --------------------------------------------------------------------------------              9.1    6.82  >-----------<  221      [08-03-21 @ 06:10]              30.3     137  |  105  |  41  ----------------------------<  162      [08-03-21 @ 06:08]  4.6   |  20  |  3.77        Ca     9.5     [08-03-21 @ 06:08]      Mg     1.9     [08-02-21 @ 17:47]      Phos  3.3     [08-03-21 @ 06:08]    TPro  6.5  /  Alb  3.5  /  TBili  0.2  /  DBili  x   /  AST  17  /  ALT  15  /  AlkPhos  66  [08-02-21 @ 17:47]          Creatinine Trend:  SCr 3.77 [08-03 @ 06:08]  SCr 4.20 [08-02 @ 17:47]  SCr 3.93 [08-02 @ 10:59]  SCr 4.63 [08-01 @ 21:33]  SCr 4.65 [08-01 @ 17:13]    Urinalysis - [08-01-21 @ 06:31]      Color Yellow / Appearance Slightly Turbid / SG 1.015 / pH 6.5      Gluc Negative / Ketone Negative  / Bili Negative / Urobili Negative       Blood Small / Protein 300 mg/dL / Leuk Est Large / Nitrite Positive      RBC 8 /  / Hyaline 0 / Gran  / Sq Epi  / Non Sq Epi 1 / Bacteria Negative      Iron 75, TIBC 278, %sat 27      [06-02-21 @ 16:51]  Ferritin 450      [06-02-21 @ 17:06]  PTH -- (Ca 10.3)      [06-04-21 @ 09:37]   22  PTH -- (Ca --)      [06-02-21 @ 17:06]   15  Vitamin D (25OH) 37.0      [06-04-21 @ 09:37]  HbA1c 6.0      [08-09-19 @ 23:48]

## 2021-08-03 NOTE — PROGRESS NOTE ADULT - PROBLEM SELECTOR PLAN 2
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 Has chronic ureteral stent as well which may be a nidus for infection.  -Started cefepime 1g IV q24h (renal dosing). May need to adjust as creatinine improves.  -F/U UCx, currently with 2 different GNR  -Case discussed with urology PA - recommending IR evaluation if decision is made to remove or replace stent, as they placed stent initially  -Trend CBC. 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 Has chronic ureteral stent as well which may be a nidus for infection.  -Started cefepime 1g IV q24h (renal dosing). May need to adjust as creatinine improves.  -F/U UCx: Pseudomonas, pending sensitivities  -Case discussed with urology PA - recommending IR evaluation if decision is made to remove or replace stent, as they placed stent initially  -Trend CBC.

## 2021-08-03 NOTE — PROGRESS NOTE ADULT - PROBLEM SELECTOR PLAN 3
-Resume home dose of envarsus 7 mg PO daily.  -Resume home dose of  mg PO BID.  -Resume home dose of prednisone 5 mg PO daily.  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 Continue allopurinol.  -Velez up any further transplant renal recs

## 2021-08-03 NOTE — PROGRESS NOTE ADULT - PROBLEM SELECTOR PLAN 4
Unclear if this is fulminant diarrhea or constipation with overflow. Patients symptoms and presentation are atypical.  -Hold off on adding any stool softners/laxatives/anti-diarrheals.  -If no diarrhea, will consider addition of stool softeners.  -If true diarrhea, will begin work-up for infectious causes, like C. diff and GI PCR testing  -Monitor PO intake. Unclear if this is fulminant diarrhea or constipation with overflow. Patients symptoms and presentation are atypical.  -Hold off on adding any stool softners/laxatives/anti-diarrheals.  -If no diarrhea, will consider addition of stool softeners.  - GI PCR negative. 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

## 2021-08-04 LAB
ANION GAP SERPL CALC-SCNC: 10 MMOL/L — SIGNIFICANT CHANGE UP (ref 5–17)
ANION GAP SERPL CALC-SCNC: 11 MMOL/L — SIGNIFICANT CHANGE UP (ref 5–17)
BASOPHILS # BLD AUTO: 0.04 K/UL — SIGNIFICANT CHANGE UP (ref 0–0.2)
BASOPHILS NFR BLD AUTO: 0.6 % — SIGNIFICANT CHANGE UP (ref 0–2)
BUN SERPL-MCNC: 39 MG/DL — HIGH (ref 7–23)
BUN SERPL-MCNC: 39 MG/DL — HIGH (ref 7–23)
CALCIUM SERPL-MCNC: 9.9 MG/DL — SIGNIFICANT CHANGE UP (ref 8.4–10.5)
CALCIUM SERPL-MCNC: 9.9 MG/DL — SIGNIFICANT CHANGE UP (ref 8.4–10.5)
CHLORIDE SERPL-SCNC: 104 MMOL/L — SIGNIFICANT CHANGE UP (ref 96–108)
CHLORIDE SERPL-SCNC: 107 MMOL/L — SIGNIFICANT CHANGE UP (ref 96–108)
CO2 SERPL-SCNC: 22 MMOL/L — SIGNIFICANT CHANGE UP (ref 22–31)
CO2 SERPL-SCNC: 22 MMOL/L — SIGNIFICANT CHANGE UP (ref 22–31)
CREAT SERPL-MCNC: 3.23 MG/DL — HIGH (ref 0.5–1.3)
CREAT SERPL-MCNC: 3.35 MG/DL — HIGH (ref 0.5–1.3)
EOSINOPHIL # BLD AUTO: 0.16 K/UL — SIGNIFICANT CHANGE UP (ref 0–0.5)
EOSINOPHIL NFR BLD AUTO: 2.2 % — SIGNIFICANT CHANGE UP (ref 0–6)
GLUCOSE BLDC GLUCOMTR-MCNC: 143 MG/DL — HIGH (ref 70–99)
GLUCOSE BLDC GLUCOMTR-MCNC: 145 MG/DL — HIGH (ref 70–99)
GLUCOSE BLDC GLUCOMTR-MCNC: 156 MG/DL — HIGH (ref 70–99)
GLUCOSE BLDC GLUCOMTR-MCNC: 196 MG/DL — HIGH (ref 70–99)
GLUCOSE SERPL-MCNC: 134 MG/DL — HIGH (ref 70–99)
GLUCOSE SERPL-MCNC: 170 MG/DL — HIGH (ref 70–99)
HCT VFR BLD CALC: 31.9 % — LOW (ref 39–50)
HGB BLD-MCNC: 9.7 G/DL — LOW (ref 13–17)
IMM GRANULOCYTES NFR BLD AUTO: 2.5 % — HIGH (ref 0–1.5)
LYMPHOCYTES # BLD AUTO: 1.92 K/UL — SIGNIFICANT CHANGE UP (ref 1–3.3)
LYMPHOCYTES # BLD AUTO: 26.6 % — SIGNIFICANT CHANGE UP (ref 13–44)
MAGNESIUM SERPL-MCNC: 1.8 MG/DL — SIGNIFICANT CHANGE UP (ref 1.6–2.6)
MCHC RBC-ENTMCNC: 29.1 PG — SIGNIFICANT CHANGE UP (ref 27–34)
MCHC RBC-ENTMCNC: 30.4 GM/DL — LOW (ref 32–36)
MCV RBC AUTO: 95.8 FL — SIGNIFICANT CHANGE UP (ref 80–100)
MONOCYTES # BLD AUTO: 0.47 K/UL — SIGNIFICANT CHANGE UP (ref 0–0.9)
MONOCYTES NFR BLD AUTO: 6.5 % — SIGNIFICANT CHANGE UP (ref 2–14)
NEUTROPHILS # BLD AUTO: 4.44 K/UL — SIGNIFICANT CHANGE UP (ref 1.8–7.4)
NEUTROPHILS NFR BLD AUTO: 61.6 % — SIGNIFICANT CHANGE UP (ref 43–77)
NRBC # BLD: 0 /100 WBCS — SIGNIFICANT CHANGE UP (ref 0–0)
PHOSPHATE SERPL-MCNC: 3 MG/DL — SIGNIFICANT CHANGE UP (ref 2.5–4.5)
PLATELET # BLD AUTO: 225 K/UL — SIGNIFICANT CHANGE UP (ref 150–400)
POTASSIUM SERPL-MCNC: 5.1 MMOL/L — SIGNIFICANT CHANGE UP (ref 3.5–5.3)
POTASSIUM SERPL-MCNC: 5.3 MMOL/L — SIGNIFICANT CHANGE UP (ref 3.5–5.3)
POTASSIUM SERPL-SCNC: 5.1 MMOL/L — SIGNIFICANT CHANGE UP (ref 3.5–5.3)
POTASSIUM SERPL-SCNC: 5.3 MMOL/L — SIGNIFICANT CHANGE UP (ref 3.5–5.3)
RBC # BLD: 3.33 M/UL — LOW (ref 4.2–5.8)
RBC # FLD: 15.1 % — HIGH (ref 10.3–14.5)
SODIUM SERPL-SCNC: 136 MMOL/L — SIGNIFICANT CHANGE UP (ref 135–145)
SODIUM SERPL-SCNC: 140 MMOL/L — SIGNIFICANT CHANGE UP (ref 135–145)
TACROLIMUS SERPL-MCNC: 7.2 NG/ML — SIGNIFICANT CHANGE UP
WBC # BLD: 7.21 K/UL — SIGNIFICANT CHANGE UP (ref 3.8–10.5)
WBC # FLD AUTO: 7.21 K/UL — SIGNIFICANT CHANGE UP (ref 3.8–10.5)

## 2021-08-04 PROCEDURE — 99233 SBSQ HOSP IP/OBS HIGH 50: CPT | Mod: GC

## 2021-08-04 RX ORDER — FINASTERIDE 5 MG/1
5 TABLET, FILM COATED ORAL DAILY
Refills: 0 | Status: DISCONTINUED | OUTPATIENT
Start: 2021-08-04 | End: 2021-08-07

## 2021-08-04 RX ORDER — POLYETHYLENE GLYCOL 3350 17 G/17G
17 POWDER, FOR SOLUTION ORAL ONCE
Refills: 0 | Status: COMPLETED | OUTPATIENT
Start: 2021-08-04 | End: 2021-08-05

## 2021-08-04 RX ADMIN — Medication 650 MILLIGRAM(S): at 21:20

## 2021-08-04 RX ADMIN — HEPARIN SODIUM 5000 UNIT(S): 5000 INJECTION INTRAVENOUS; SUBCUTANEOUS at 05:49

## 2021-08-04 RX ADMIN — Medication 1: at 08:46

## 2021-08-04 RX ADMIN — Medication 15 MILLILITER(S): at 21:20

## 2021-08-04 RX ADMIN — Medication 15 MILLILITER(S): at 12:31

## 2021-08-04 RX ADMIN — TAMSULOSIN HYDROCHLORIDE 0.4 MILLIGRAM(S): 0.4 CAPSULE ORAL at 21:19

## 2021-08-04 RX ADMIN — MYCOPHENOLATE MOFETIL 250 MILLIGRAM(S): 250 CAPSULE ORAL at 05:49

## 2021-08-04 RX ADMIN — TACROLIMUS 7 MILLIGRAM(S): 5 CAPSULE ORAL at 05:49

## 2021-08-04 RX ADMIN — Medication 15 MILLILITER(S): at 05:48

## 2021-08-04 RX ADMIN — HEPARIN SODIUM 5000 UNIT(S): 5000 INJECTION INTRAVENOUS; SUBCUTANEOUS at 21:20

## 2021-08-04 RX ADMIN — Medication 5 MILLIGRAM(S): at 05:49

## 2021-08-04 RX ADMIN — Medication 1: at 12:35

## 2021-08-04 RX ADMIN — HEPARIN SODIUM 5000 UNIT(S): 5000 INJECTION INTRAVENOUS; SUBCUTANEOUS at 12:31

## 2021-08-04 RX ADMIN — FINASTERIDE 5 MILLIGRAM(S): 5 TABLET, FILM COATED ORAL at 12:30

## 2021-08-04 RX ADMIN — POLYETHYLENE GLYCOL 3350 17 GRAM(S): 17 POWDER, FOR SOLUTION ORAL at 12:30

## 2021-08-04 RX ADMIN — MYCOPHENOLATE MOFETIL 250 MILLIGRAM(S): 250 CAPSULE ORAL at 17:54

## 2021-08-04 RX ADMIN — Medication 650 MILLIGRAM(S): at 05:48

## 2021-08-04 RX ADMIN — Medication 100 MILLIGRAM(S): at 05:48

## 2021-08-04 RX ADMIN — PHENYLEPHRINE-SHARK LIVER OIL-MINERAL OIL-PETROLATUM RECTAL OINTMENT 1 APPLICATION(S): at 12:41

## 2021-08-04 RX ADMIN — CEFEPIME 100 MILLIGRAM(S): 1 INJECTION, POWDER, FOR SOLUTION INTRAMUSCULAR; INTRAVENOUS at 12:30

## 2021-08-04 RX ADMIN — Medication 60 MILLIGRAM(S): at 05:48

## 2021-08-04 RX ADMIN — PHENYLEPHRINE-SHARK LIVER OIL-MINERAL OIL-PETROLATUM RECTAL OINTMENT 1 APPLICATION(S): at 05:49

## 2021-08-04 RX ADMIN — Medication 100 MILLIGRAM(S): at 12:30

## 2021-08-04 NOTE — PHYSICAL THERAPY INITIAL EVALUATION ADULT - PERTINENT HX OF CURRENT PROBLEM, REHAB EVAL
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 Dx: CHELA

## 2021-08-04 NOTE — PROGRESS NOTE ADULT - PROBLEM SELECTOR PLAN 2
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 Has chronic ureteral stent as well which may be a nidus for infection.  -Started cefepime 1g IV q24h (renal dosing). May need to adjust as creatinine improves.  -F/U UCx: Pseudomonas, pending sensitivities  -Case discussed with urology PA - recommending IR evaluation if decision is made to remove or replace stent, as they placed stent initially  -Trend CBC. 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 Has chronic ureteral stent as well which may be a nidus for infection.  -Started cefepime 1g IV q24h (renal dosing). May need to adjust as creatinine improves.  -c/w CEFEPIME 4/7 (started 08/1)  -F/U UCx: Pseudomonas, resistant to PO meds  -Case discussed with urology PA - recommending IR evaluation if decision is made to remove or replace stent, as they placed stent initially history of pseudomonal organisms in the past. Has chronic ureteral stent as well which may be a nidus for infection.  -Started cefepime 1g IV q24h (renal dosing). May need to adjust as creatinine improves.  -c/w CEFEPIME 4/7 (started 08/1)  -UCx: Pseudomonas, resistant to PO meds. c/w IV ABx  -Case discussed with urology PA - recommending IR evaluation if decision is made to remove or replace stent, as they placed stent initially

## 2021-08-04 NOTE — PROGRESS NOTE ADULT - PROBLEM SELECTOR PLAN 7
-Continue flomax.  -Urology f/u    DVT PPx; HSQ TID given renal insufficiency. -Continue flomax  - Started Finasteride  -Urology f/u    DVT PPx; HSQ TID given renal insufficiency.

## 2021-08-04 NOTE — PROGRESS NOTE ADULT - PROBLEM SELECTOR PLAN 4
Unclear if this is fulminant diarrhea or constipation with overflow. Patients symptoms and presentation are atypical.  -Hold off on adding any stool softners/laxatives/anti-diarrheals.  -If no diarrhea, will consider addition of stool softeners.  - GI PCR negative. 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

## 2021-08-04 NOTE — PHYSICAL THERAPY INITIAL EVALUATION ADULT - LIVES WITH, PROFILE
Pt lives with spouse in private house with 4 steps to enter. Prior to admission pt was independent in ADLs and ambulation with cane. No DMEs, home care, or HHA services present./spouse Pt lives with spouse in private house with 4 steps to enter +HR, and flight of stairs +HR up to second level. Prior to admission pt was independent in ADLs and ambulation with cane. No DMEs, home care, or HHA services present./spouse

## 2021-08-04 NOTE — PROGRESS NOTE ADULT - SUBJECTIVE AND OBJECTIVE BOX
PROGRESS NOTE:   Authored by Dr. Ketan Kraft pager [facility]: [phone]    Patient is a 70y old  Male who presents with a chief complaint of Urinary Retention (03 Aug 2021 12:14)      SUBJECTIVE / OVERNIGHT EVENTS:    ADDITIONAL REVIEW OF SYSTEMS:    MEDICATIONS  (STANDING):  allopurinol 100 milliGRAM(s) Oral daily  cefepime   IVPB 1000 milliGRAM(s) IV Intermittent every 24 hours  cefepime   IVPB      citric acid/sodium citrate Solution 15 milliLiter(s) Oral three times a day  dextrose 40% Gel 15 Gram(s) Oral once  dextrose 5%. 1000 milliLiter(s) (50 mL/Hr) IV Continuous <Continuous>  dextrose 5%. 1000 milliLiter(s) (100 mL/Hr) IV Continuous <Continuous>  dextrose 50% Injectable 12.5 Gram(s) IV Push once  dextrose 50% Injectable 25 Gram(s) IV Push once  dextrose 50% Injectable 25 Gram(s) IV Push once  finasteride 5 milliGRAM(s) Oral daily  glucagon  Injectable 1 milliGRAM(s) IntraMuscular once  hemorrhoidal Ointment 1 Application(s) Rectal every 8 hours  heparin   Injectable 5000 Unit(s) SubCutaneous every 8 hours  insulin lispro (ADMELOG) corrective regimen sliding scale   SubCutaneous three times a day before meals  insulin lispro (ADMELOG) corrective regimen sliding scale   SubCutaneous at bedtime  metoprolol succinate  milliGRAM(s) Oral daily  mycophenolate mofetil 250 milliGRAM(s) Oral two times a day  NIFEdipine XL 60 milliGRAM(s) Oral daily  polyethylene glycol 3350 17 Gram(s) Oral daily  predniSONE   Tablet 5 milliGRAM(s) Oral daily  tacrolimus ER Tablet (ENVARSUS XR) 7 milliGRAM(s) Oral daily  tamsulosin 0.4 milliGRAM(s) Oral at bedtime    MEDICATIONS  (PRN):  acetaminophen   Tablet .. 650 milliGRAM(s) Oral every 6 hours PRN Temp greater or equal to 38.5C (101.3F), Mild Pain (1 - 3)      CAPILLARY BLOOD GLUCOSE      POCT Blood Glucose.: 196 mg/dL (04 Aug 2021 12:33)  POCT Blood Glucose.: 156 mg/dL (04 Aug 2021 08:08)  POCT Blood Glucose.: 161 mg/dL (03 Aug 2021 21:37)  POCT Blood Glucose.: 149 mg/dL (03 Aug 2021 18:13)    I&O's Summary    03 Aug 2021 07:01  -  04 Aug 2021 07:00  --------------------------------------------------------  IN: 0 mL / OUT: 1450 mL / NET: -1450 mL    04 Aug 2021 07:01  -  04 Aug 2021 15:00  --------------------------------------------------------  IN: 0 mL / OUT: 900 mL / NET: -900 mL        PHYSICAL EXAM:  Vital Signs Last 24 Hrs  T(C): 36.8 (04 Aug 2021 13:51), Max: 36.9 (04 Aug 2021 05:39)  T(F): 98.3 (04 Aug 2021 13:51), Max: 98.4 (04 Aug 2021 05:39)  HR: 73 (04 Aug 2021 14:20) (71 - 82)  BP: 136/68 (04 Aug 2021 14:20) (101/68 - 161/74)  BP(mean): --  RR: 18 (04 Aug 2021 13:51) (18 - 18)  SpO2: 98% (04 Aug 2021 13:51) (98% - 98%)  GENERAL: NAD, lying comfortably in bed  HEAD: Atraumatic, normocephalic  EYES: EOMI b/l PERRLA b/l, conjunctiva and sclera clear  NECK: Supple, No JVD, No LAD  RESPIRATORY: Normal respiratory effort; lungs are clear to auscultation bilaterally  CARDIOVASCULAR: Regular rate and rhythm, normal S1 and S2, no murmur/rub/gallop; No lower extremity edema; Peripheral pulses are 2+ bilaterally  ABDOMEN: Nontender to palpation, normoactive bowel sounds, no rebound/guarding; No hepatosplenomegaly  MUSCLOSKELETAL: no clubbing or cyanosis of digits; no joint swelling or tenderness to palpation  NEURO: Non focal   PSYCH: A+O to person, place, and time; affect appropriate    LABS:                        9.7    7.21  )-----------( 225      ( 04 Aug 2021 08:34 )             31.9     08-04    140  |  107  |  39<H>  ----------------------------<  170<H>  5.3   |  22  |  3.35<H>    Ca    9.9      04 Aug 2021 08:34  Phos  3.0     08-04  Mg     1.8     08-04    TPro  6.5  /  Alb  3.5  /  TBili  0.2  /  DBili  x   /  AST  17  /  ALT  15  /  AlkPhos  66  08-02              GI PCR Panel, Stool (collected 02 Aug 2021 22:32)  Source: .Stool Feces  Final Report (03 Aug 2021 02:24):    GI PCR Results: NOT detected    *******Please Note:*******    GI panel PCR evaluates for:    Campylobacter, Plesiomonas shigelloides, Salmonella,    Vibrio, Yersinia enterocolitica, Enteroaggregative    Escherichia coli (EAEC), Enteropathogenic E.coli (EPEC),    Enterotoxigenic E. coli (ETEC) lt/st, Shiga-like    toxin-producing E. coli (STEC) stx1/stx2,    Shigella/ Enteroinvasive E. coli (EIEC), Cryptosporidium,    Cyclospora cayetanensis, Entamoeba histolytica,    Giardia lamblia, Adenovirus F 40/41, Astrovirus,    Norovirus GI/GII, Rotavirus A, Sapovirus        Tele Reviewed:    RADIOLOGY & ADDITIONAL TESTS:  Results Reviewed:   Imaging Personally Reviewed:  Electrocardiogram Personally Reviewed:    COORDINATION OF CARE:  Care Discussed with Consultants/Other Providers [Y/N]:  Prior or Outpatient Records Reviewed [Y/N]:   PROGRESS NOTE:   Authored by Dr. Ketan Kraft pager [facility]: [phone]    Patient is a 70y old  Male who presents with a chief complaint of Urinary Retention (03 Aug 2021 12:14)      SUBJECTIVE / OVERNIGHT EVENTS: No overnight events. Patient reported feeling well. Reported continuing to have diarrhea, denied dysuria, abdominal pain, n/v/d.    ADDITIONAL REVIEW OF SYSTEMS:  Constitutional: No fever, No weight loss, good appetite/po intake  Head: No headache   Eyes: No blurry vision, No diplopia  Neuro: No tremors, No muscle weakness   Cardiovascular: No chest pain, No palpitations  Respiratory: No SOB, No cough  GI: No nausea, No vomiting, No diarrhea  : No dysuria, No hematuria  Skin: No rash  MSK: No joint pain   Psych: No depression      MEDICATIONS  (STANDING):  allopurinol 100 milliGRAM(s) Oral daily  cefepime   IVPB 1000 milliGRAM(s) IV Intermittent every 24 hours  cefepime   IVPB      citric acid/sodium citrate Solution 15 milliLiter(s) Oral three times a day  dextrose 40% Gel 15 Gram(s) Oral once  dextrose 5%. 1000 milliLiter(s) (50 mL/Hr) IV Continuous <Continuous>  dextrose 5%. 1000 milliLiter(s) (100 mL/Hr) IV Continuous <Continuous>  dextrose 50% Injectable 12.5 Gram(s) IV Push once  dextrose 50% Injectable 25 Gram(s) IV Push once  dextrose 50% Injectable 25 Gram(s) IV Push once  finasteride 5 milliGRAM(s) Oral daily  glucagon  Injectable 1 milliGRAM(s) IntraMuscular once  hemorrhoidal Ointment 1 Application(s) Rectal every 8 hours  heparin   Injectable 5000 Unit(s) SubCutaneous every 8 hours  insulin lispro (ADMELOG) corrective regimen sliding scale   SubCutaneous three times a day before meals  insulin lispro (ADMELOG) corrective regimen sliding scale   SubCutaneous at bedtime  metoprolol succinate  milliGRAM(s) Oral daily  mycophenolate mofetil 250 milliGRAM(s) Oral two times a day  NIFEdipine XL 60 milliGRAM(s) Oral daily  polyethylene glycol 3350 17 Gram(s) Oral daily  predniSONE   Tablet 5 milliGRAM(s) Oral daily  tacrolimus ER Tablet (ENVARSUS XR) 7 milliGRAM(s) Oral daily  tamsulosin 0.4 milliGRAM(s) Oral at bedtime    MEDICATIONS  (PRN):  acetaminophen   Tablet .. 650 milliGRAM(s) Oral every 6 hours PRN Temp greater or equal to 38.5C (101.3F), Mild Pain (1 - 3)      CAPILLARY BLOOD GLUCOSE      POCT Blood Glucose.: 196 mg/dL (04 Aug 2021 12:33)  POCT Blood Glucose.: 156 mg/dL (04 Aug 2021 08:08)  POCT Blood Glucose.: 161 mg/dL (03 Aug 2021 21:37)  POCT Blood Glucose.: 149 mg/dL (03 Aug 2021 18:13)    I&O's Summary    03 Aug 2021 07:01  -  04 Aug 2021 07:00  --------------------------------------------------------  IN: 0 mL / OUT: 1450 mL / NET: -1450 mL    04 Aug 2021 07:01  -  04 Aug 2021 15:00  --------------------------------------------------------  IN: 0 mL / OUT: 900 mL / NET: -900 mL        PHYSICAL EXAM:  Vital Signs Last 24 Hrs  T(C): 36.8 (04 Aug 2021 13:51), Max: 36.9 (04 Aug 2021 05:39)  T(F): 98.3 (04 Aug 2021 13:51), Max: 98.4 (04 Aug 2021 05:39)  HR: 73 (04 Aug 2021 14:20) (71 - 82)  BP: 136/68 (04 Aug 2021 14:20) (101/68 - 161/74)  BP(mean): --  RR: 18 (04 Aug 2021 13:51) (18 - 18)  SpO2: 98% (04 Aug 2021 13:51) (98% - 98%)    GENERAL: NAD, lying comfortably in bed  HEAD: Atraumatic, normocephalic  EYES: EOMI b/l PERRLA b/l, conjunctiva and sclera clear  NECK: Supple, No JVD, No LAD  RESPIRATORY: Normal respiratory effort; lungs are clear to auscultation bilaterally  CARDIOVASCULAR: Regular rate and rhythm, normal S1 and S2, no murmur/rub/gallop; No lower extremity edema; Peripheral pulses are 2+ bilaterally  ABDOMEN: Nontender to palpation, normoactive bowel sounds, no rebound/guarding; No hepatosplenomegaly  MUSCULOSKELETAL: no clubbing or cyanosis of digits; no joint swelling or tenderness to palpation  NEURO: Non focal   PSYCH: A+O to person, place, and time; affect appropriate      LABS:                        9.7    7.21  )-----------( 225      ( 04 Aug 2021 08:34 )             31.9     08-04    140  |  107  |  39<H>  ----------------------------<  170<H>  5.3   |  22  |  3.35<H>    Ca    9.9      04 Aug 2021 08:34  Phos  3.0     08-04  Mg     1.8     08-04    TPro  6.5  /  Alb  3.5  /  TBili  0.2  /  DBili  x   /  AST  17  /  ALT  15  /  AlkPhos  66  08-02              GI PCR Panel, Stool (collected 02 Aug 2021 22:32)  Source: .Stool Feces  Final Report (03 Aug 2021 02:24):    GI PCR Results: NOT detected    *******Please Note:*******    GI panel PCR evaluates for:    Campylobacter, Plesiomonas shigelloides, Salmonella,    Vibrio, Yersinia enterocolitica, Enteroaggregative    Escherichia coli (EAEC), Enteropathogenic E.coli (EPEC),    Enterotoxigenic E. coli (ETEC) lt/st, Shiga-like    toxin-producing E. coli (STEC) stx1/stx2,    Shigella/ Enteroinvasive E. coli (EIEC), Cryptosporidium,    Cyclospora cayetanensis, Entamoeba histolytica,    Giardia lamblia, Adenovirus F 40/41, Astrovirus,    Norovirus GI/GII, Rotavirus A, Sapovirus        Tele Reviewed:    RADIOLOGY & ADDITIONAL TESTS:  Results Reviewed:   Imaging Personally Reviewed:  Electrocardiogram Personally Reviewed:    COORDINATION OF CARE:  Care Discussed with Consultants/Other Providers [Y/N]:  Prior or Outpatient Records Reviewed [Y/N]:

## 2021-08-04 NOTE — PROGRESS NOTE ADULT - PROBLEM SELECTOR PLAN 1
with baseline creatinine in the 2-range, suspect CKD stage III as well.  Etiology most likely secondary to urinary retention. Alternate etiologies to consider would be outpatient bactrim use, urinary tract infection, hypovolemia in setting of reported diarrhea.  - TOV today  -Monitor Is and Os.  -Avoid nephrotoxic medications.  -Transplant nephrology f/u. with baseline creatinine in the 2-range, suspect CKD stage III as well.  Etiology most likely secondary to urinary retention. Alternate etiologies to consider would be outpatient bactrim use, urinary tract infection, hypovolemia in setting of reported diarrhea.  - Imprving renal function today: Scr3.77  - TOV started, patient w/ increasing output throughout the night  - Started finesteride iso of BPPH hx, c/w Tamsulosin  -Monitor Is and Os.  -Avoid nephrotoxic medications.  -Transplant nephrology f/u.  - Recent labs showed Hyperkalemia (5.3), will repeat CHEM in afternoon to monitor with baseline creatinine in the 2-range, suspect CKD stage III as well.  Etiology most likely secondary to urinary retention  - Improving renal function today: Scr3.77  - TOV started, patient w/ increasing output throughout the night  - Started finesteride iso of BPPH hx, c/w Tamsulosin  -Monitor Is and Os.  -Avoid nephrotoxic medications.  - Recent labs showed Hyperkalemia (5.3), will repeat CHEM in afternoon to monitor

## 2021-08-05 DIAGNOSIS — R33.9 RETENTION OF URINE, UNSPECIFIED: ICD-10-CM

## 2021-08-05 LAB
ANION GAP SERPL CALC-SCNC: 13 MMOL/L — SIGNIFICANT CHANGE UP (ref 5–17)
BASOPHILS # BLD AUTO: 0.04 K/UL — SIGNIFICANT CHANGE UP (ref 0–0.2)
BASOPHILS NFR BLD AUTO: 0.7 % — SIGNIFICANT CHANGE UP (ref 0–2)
BUN SERPL-MCNC: 38 MG/DL — HIGH (ref 7–23)
CALCIUM SERPL-MCNC: 9.5 MG/DL — SIGNIFICANT CHANGE UP (ref 8.4–10.5)
CHLORIDE SERPL-SCNC: 105 MMOL/L — SIGNIFICANT CHANGE UP (ref 96–108)
CO2 SERPL-SCNC: 18 MMOL/L — LOW (ref 22–31)
CREAT SERPL-MCNC: 2.98 MG/DL — HIGH (ref 0.5–1.3)
EOSINOPHIL # BLD AUTO: 0.15 K/UL — SIGNIFICANT CHANGE UP (ref 0–0.5)
EOSINOPHIL NFR BLD AUTO: 2.4 % — SIGNIFICANT CHANGE UP (ref 0–6)
GLUCOSE BLDC GLUCOMTR-MCNC: 149 MG/DL — HIGH (ref 70–99)
GLUCOSE BLDC GLUCOMTR-MCNC: 175 MG/DL — HIGH (ref 70–99)
GLUCOSE BLDC GLUCOMTR-MCNC: 181 MG/DL — HIGH (ref 70–99)
GLUCOSE BLDC GLUCOMTR-MCNC: 200 MG/DL — HIGH (ref 70–99)
GLUCOSE SERPL-MCNC: 120 MG/DL — HIGH (ref 70–99)
HCT VFR BLD CALC: 29.1 % — LOW (ref 39–50)
HGB BLD-MCNC: 9 G/DL — LOW (ref 13–17)
IMM GRANULOCYTES NFR BLD AUTO: 3.9 % — HIGH (ref 0–1.5)
LYMPHOCYTES # BLD AUTO: 1.65 K/UL — SIGNIFICANT CHANGE UP (ref 1–3.3)
LYMPHOCYTES # BLD AUTO: 26.9 % — SIGNIFICANT CHANGE UP (ref 13–44)
MAGNESIUM SERPL-MCNC: 1.7 MG/DL — SIGNIFICANT CHANGE UP (ref 1.6–2.6)
MCHC RBC-ENTMCNC: 29.7 PG — SIGNIFICANT CHANGE UP (ref 27–34)
MCHC RBC-ENTMCNC: 30.9 GM/DL — LOW (ref 32–36)
MCV RBC AUTO: 96 FL — SIGNIFICANT CHANGE UP (ref 80–100)
MONOCYTES # BLD AUTO: 0.56 K/UL — SIGNIFICANT CHANGE UP (ref 0–0.9)
MONOCYTES NFR BLD AUTO: 9.1 % — SIGNIFICANT CHANGE UP (ref 2–14)
NEUTROPHILS # BLD AUTO: 3.49 K/UL — SIGNIFICANT CHANGE UP (ref 1.8–7.4)
NEUTROPHILS NFR BLD AUTO: 57 % — SIGNIFICANT CHANGE UP (ref 43–77)
NRBC # BLD: 0 /100 WBCS — SIGNIFICANT CHANGE UP (ref 0–0)
PHOSPHATE SERPL-MCNC: 2.9 MG/DL — SIGNIFICANT CHANGE UP (ref 2.5–4.5)
PLATELET # BLD AUTO: 194 K/UL — SIGNIFICANT CHANGE UP (ref 150–400)
POTASSIUM SERPL-MCNC: 4.7 MMOL/L — SIGNIFICANT CHANGE UP (ref 3.5–5.3)
POTASSIUM SERPL-SCNC: 4.7 MMOL/L — SIGNIFICANT CHANGE UP (ref 3.5–5.3)
RBC # BLD: 3.03 M/UL — LOW (ref 4.2–5.8)
RBC # FLD: 15.3 % — HIGH (ref 10.3–14.5)
SODIUM SERPL-SCNC: 136 MMOL/L — SIGNIFICANT CHANGE UP (ref 135–145)
TACROLIMUS SERPL-MCNC: 4.2 NG/ML — SIGNIFICANT CHANGE UP
WBC # BLD: 6.13 K/UL — SIGNIFICANT CHANGE UP (ref 3.8–10.5)
WBC # FLD AUTO: 6.13 K/UL — SIGNIFICANT CHANGE UP (ref 3.8–10.5)

## 2021-08-05 PROCEDURE — 99232 SBSQ HOSP IP/OBS MODERATE 35: CPT | Mod: GC

## 2021-08-05 PROCEDURE — 99233 SBSQ HOSP IP/OBS HIGH 50: CPT | Mod: GC

## 2021-08-05 RX ORDER — VALGANCICLOVIR 450 MG/1
450 TABLET, FILM COATED ORAL DAILY
Refills: 0 | Status: DISCONTINUED | OUTPATIENT
Start: 2021-08-05 | End: 2021-08-07

## 2021-08-05 RX ORDER — NITROGLYCERIN 6.5 MG
1 CAPSULE, EXTENDED RELEASE ORAL
Refills: 0 | Status: DISCONTINUED | OUTPATIENT
Start: 2021-08-05 | End: 2021-08-07

## 2021-08-05 RX ADMIN — MYCOPHENOLATE MOFETIL 250 MILLIGRAM(S): 250 CAPSULE ORAL at 17:37

## 2021-08-05 RX ADMIN — Medication 1: at 17:37

## 2021-08-05 RX ADMIN — TACROLIMUS 7 MILLIGRAM(S): 5 CAPSULE ORAL at 05:46

## 2021-08-05 RX ADMIN — Medication 100 MILLIGRAM(S): at 05:36

## 2021-08-05 RX ADMIN — MYCOPHENOLATE MOFETIL 250 MILLIGRAM(S): 250 CAPSULE ORAL at 05:36

## 2021-08-05 RX ADMIN — POLYETHYLENE GLYCOL 3350 17 GRAM(S): 17 POWDER, FOR SOLUTION ORAL at 00:17

## 2021-08-05 RX ADMIN — HEPARIN SODIUM 5000 UNIT(S): 5000 INJECTION INTRAVENOUS; SUBCUTANEOUS at 05:35

## 2021-08-05 RX ADMIN — Medication 15 MILLILITER(S): at 13:02

## 2021-08-05 RX ADMIN — Medication 1: at 08:50

## 2021-08-05 RX ADMIN — Medication 100 MILLIGRAM(S): at 13:01

## 2021-08-05 RX ADMIN — Medication 60 MILLIGRAM(S): at 05:36

## 2021-08-05 RX ADMIN — HEPARIN SODIUM 5000 UNIT(S): 5000 INJECTION INTRAVENOUS; SUBCUTANEOUS at 13:01

## 2021-08-05 RX ADMIN — PHENYLEPHRINE-SHARK LIVER OIL-MINERAL OIL-PETROLATUM RECTAL OINTMENT 1 APPLICATION(S): at 05:37

## 2021-08-05 RX ADMIN — Medication 15 MILLILITER(S): at 22:39

## 2021-08-05 RX ADMIN — Medication 5 MILLIGRAM(S): at 05:36

## 2021-08-05 RX ADMIN — Medication 1: at 13:01

## 2021-08-05 RX ADMIN — TAMSULOSIN HYDROCHLORIDE 0.4 MILLIGRAM(S): 0.4 CAPSULE ORAL at 22:40

## 2021-08-05 RX ADMIN — Medication 1 APPLICATION(S): at 22:41

## 2021-08-05 RX ADMIN — HEPARIN SODIUM 5000 UNIT(S): 5000 INJECTION INTRAVENOUS; SUBCUTANEOUS at 22:39

## 2021-08-05 RX ADMIN — Medication 650 MILLIGRAM(S): at 22:41

## 2021-08-05 RX ADMIN — FINASTERIDE 5 MILLIGRAM(S): 5 TABLET, FILM COATED ORAL at 13:01

## 2021-08-05 RX ADMIN — CEFEPIME 100 MILLIGRAM(S): 1 INJECTION, POWDER, FOR SOLUTION INTRAMUSCULAR; INTRAVENOUS at 13:02

## 2021-08-05 RX ADMIN — Medication 15 MILLILITER(S): at 05:36

## 2021-08-05 NOTE — PROGRESS NOTE ADULT - PROBLEM SELECTOR PLAN 1
with baseline creatinine in the 2-range, suspect CKD stage III as well.  Etiology most likely secondary to urinary retention  - Improving renal function today: Scr3 2.98  - TOV started. Might need indwelling Velez for continued urinary retention needing straigh cath  - Started finesteride iso of BPPH hx, c/w Tamsulosin  -Monitor Is and Os.  -Avoid nephrotoxic medications.  -Hyperkalemia self-resolved

## 2021-08-05 NOTE — PROGRESS NOTE ADULT - PROBLEM SELECTOR PLAN 5
Unclear if this is fulminant diarrhea or constipation with overflow. Patients symptoms and presentation are atypical.  -Hold off on adding any stool softners/laxatives/anti-diarrheals.rs.  - GI PCR negative. 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

## 2021-08-05 NOTE — PROGRESS NOTE ADULT - SUBJECTIVE AND OBJECTIVE BOX
[facility] DIVISION OF KIDNEY DISEASES AND HYPERTENSION -- FOLLOW UP NOTE  --------------------------------------------------------------------------------    24 hour events/subjective: Patient was seen and examined at bedside. Failed TOV. Reported feeling well. Denies CP, SOB, fever, chills, nausea, vomiting, diarrhea, LE edema.      PAST HISTORY  --------------------------------------------------------------------------------  No significant changes to PMH, PSH, FHx, SHx, unless otherwise noted    ALLERGIES & MEDICATIONS  --------------------------------------------------------------------------------  Allergies    No Known Allergies    Intolerances      Standing Inpatient Medications  allopurinol 100 milliGRAM(s) Oral daily  cefepime   IVPB 1000 milliGRAM(s) IV Intermittent every 24 hours  cefepime   IVPB      citric acid/sodium citrate Solution 15 milliLiter(s) Oral three times a day  dextrose 40% Gel 15 Gram(s) Oral once  dextrose 5%. 1000 milliLiter(s) IV Continuous <Continuous>  dextrose 5%. 1000 milliLiter(s) IV Continuous <Continuous>  dextrose 50% Injectable 25 Gram(s) IV Push once  dextrose 50% Injectable 12.5 Gram(s) IV Push once  dextrose 50% Injectable 25 Gram(s) IV Push once  finasteride 5 milliGRAM(s) Oral daily  glucagon  Injectable 1 milliGRAM(s) IntraMuscular once  hemorrhoidal Ointment 1 Application(s) Rectal every 8 hours  heparin   Injectable 5000 Unit(s) SubCutaneous every 8 hours  insulin lispro (ADMELOG) corrective regimen sliding scale   SubCutaneous three times a day before meals  insulin lispro (ADMELOG) corrective regimen sliding scale   SubCutaneous at bedtime  metoprolol succinate  milliGRAM(s) Oral daily  mycophenolate mofetil 250 milliGRAM(s) Oral two times a day  NIFEdipine XL 60 milliGRAM(s) Oral daily  predniSONE   Tablet 5 milliGRAM(s) Oral daily  tacrolimus ER Tablet (ENVARSUS XR) 7 milliGRAM(s) Oral daily  tamsulosin 0.4 milliGRAM(s) Oral at bedtime    PRN Inpatient Medications  acetaminophen   Tablet .. 650 milliGRAM(s) Oral every 6 hours PRN    REVIEW OF SYSTEMS  --------------------------------------------------------------------------------  Gen: No fatigue, fevers/chills, weakness  Respiratory: No dyspnea, cough,   CV: No chest pain, PND, orthopnea  GI: No abdominal pain, diarrhea, constipation, nausea, vomiting  Transplant: No pain  : geovany   MSK: No joint pain/swelling; no back pain; no edema  Neuro: No dizziness/lightheadedness, weakness, seizures, numbness, tingling    All other systems were reviewed and are negative, except as noted.    VITALS/PHYSICAL EXAM  --------------------------------------------------------------------------------  T(C): 36.8 (08-05-21 @ 13:45), Max: 36.8 (08-05-21 @ 05:22)  HR: 74 (08-05-21 @ 13:45) (69 - 74)  BP: 112/62 (08-05-21 @ 13:45) (112/62 - 137/70)  RR: 18 (08-05-21 @ 13:45) (18 - 18)  SpO2: 99% (08-05-21 @ 13:45) (96% - 99%)  Wt(kg): --        08-04-21 @ 07:01  -  08-05-21 @ 07:00  --------------------------------------------------------  IN: 0 mL / OUT: 1850 mL / NET: -1850 mL    08-05-21 @ 07:01  -  08-05-21 @ 16:22  --------------------------------------------------------  IN: 0 mL / OUT: 1225 mL / NET: -1225 mL    Physical Exam:  	Gen: NAD  	HEENT: PERRL, supple neck, clear oropharynx  	Pulm: CTA B/L, no crackles   	CV: RRR, S1S2; no rub  	Back: No spinal or CVA tenderness; no sacral edema  	Abd: +BS, soft, nontender/nondistended                      Transplant: No tenderness, swelling  	: No suprapubic tenderness, armenta catheter with clear yellow urine   	UE: Warm, FROM; no edema; no asterixis  	LE: Warm, FROM; no edema  	Neuro: No focal deficits    LABS/STUDIES  --------------------------------------------------------------------------------              9.0    6.13  >-----------<  194      [08-05-21 @ 05:45]              29.1     136  |  105  |  38  ----------------------------<  120      [08-05-21 @ 05:45]  4.7   |  18  |  2.98        Ca     9.5     [08-05-21 @ 05:45]      Mg     1.7     [08-05-21 @ 05:45]      Phos  2.9     [08-05-21 @ 05:45]            Creatinine Trend:  SCr 2.98 [08-05 @ 05:45]  SCr 3.23 [08-04 @ 16:14]  SCr 3.35 [08-04 @ 08:34]  SCr 3.77 [08-03 @ 06:08]  SCr 4.20 [08-02 @ 17:47]    Tacrolimus (), Serum: 4.2 ng/mL (08-05 @ 07:25)  Tacrolimus (), Serum: 7.2 ng/mL (08-04 @ 07:21)  Tacrolimus (), Serum: 6.6 ng/mL (08-03 @ 07:17)  Tacrolimus (), Serum: 10.8 ng/mL (08-03 @ 02:08)            Urinalysis - [08-01-21 @ 06:31]      Color Yellow / Appearance Slightly Turbid / SG 1.015 / pH 6.5      Gluc Negative / Ketone Negative  / Bili Negative / Urobili Negative       Blood Small / Protein 300 mg/dL / Leuk Est Large / Nitrite Positive      RBC 8 /  / Hyaline 0 / Gran  / Sq Epi  / Non Sq Epi 1 / Bacteria Negative      Iron 75, TIBC 278, %sat 27      [06-02-21 @ 16:51]  Ferritin 450      [06-02-21 @ 17:06]  PTH -- (Ca 10.3)      [06-04-21 @ 09:37]   22  PTH -- (Ca --)      [06-02-21 @ 17:06]   15  Vitamin D (25OH) 37.0      [06-04-21 @ 09:37]  HbA1c 6.0      [08-09-19 @ 23:48]

## 2021-08-05 NOTE — PROGRESS NOTE ADULT - PROBLEM SELECTOR PLAN 2
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 Has chronic ureteral stent as well which may be a nidus for infection.  -Started cefepime 1g IV q24h (renal dosing). May need to adjust as creatinine improves.  -c/w CEFEPIME day 5/7 (started 08/1)  -UCx: Pseudomonas, resistant to PO meds. c/w IV ABx  -Case discussed with urology PA - recommending IR evaluation if decision is made to remove or replace stent, as they placed stent initially  - Patient continues to have retention iso UTI receiving appropriate therapy. Will recontact Uro/Nephro

## 2021-08-05 NOTE — PROGRESS NOTE ADULT - PROBLEM SELECTOR PLAN 3
right PC bandaged Patient presented with urinary retention and UTI. UTI being appropriately treated but retention continues.  - TOV started, patient needing straight cath with post void volumes of 500cc-1000cc  - Finasteride and Tamsulosin  - Will recontact URO ?

## 2021-08-05 NOTE — PROGRESS NOTE ADULT - SUBJECTIVE AND OBJECTIVE BOX
PROGRESS NOTE:   Authored by Dr. Ketan Kraft pager [facility]: [phone]    Patient is a 70y old  Male who presents with a chief complaint of Urinary Retention (04 Aug 2021 15:00)      SUBJECTIVE / OVERNIGHT EVENTS: Overnight patient needed straight cath due to urinary retention during TOV. Patient reported feeling well although concern about his urinary sxs. 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     ADDITIONAL REVIEW OF SYSTEMS:  Review of Systems:  Constitutional: No fever, No weight loss, good appetite/po intake  Head: No headache   Eyes: No blurry vision, No diplopia  Neuro: No tremors, No muscle weakness   Cardiovascular: No chest pain, No palpitations  Respiratory: No SOB, No cough  GI: No nausea, No vomiting, + diarrhea, + pain on defecation  : No dysuria, No hematuria, + decreased urine output  Skin: No rash  MSK: No joint pain   Psych: No depression      MEDICATIONS  (STANDING):  allopurinol 100 milliGRAM(s) Oral daily  cefepime   IVPB 1000 milliGRAM(s) IV Intermittent every 24 hours  cefepime   IVPB      citric acid/sodium citrate Solution 15 milliLiter(s) Oral three times a day  dextrose 40% Gel 15 Gram(s) Oral once  dextrose 5%. 1000 milliLiter(s) (50 mL/Hr) IV Continuous <Continuous>  dextrose 5%. 1000 milliLiter(s) (100 mL/Hr) IV Continuous <Continuous>  dextrose 50% Injectable 25 Gram(s) IV Push once  dextrose 50% Injectable 12.5 Gram(s) IV Push once  dextrose 50% Injectable 25 Gram(s) IV Push once  finasteride 5 milliGRAM(s) Oral daily  glucagon  Injectable 1 milliGRAM(s) IntraMuscular once  hemorrhoidal Ointment 1 Application(s) Rectal every 8 hours  heparin   Injectable 5000 Unit(s) SubCutaneous every 8 hours  insulin lispro (ADMELOG) corrective regimen sliding scale   SubCutaneous three times a day before meals  insulin lispro (ADMELOG) corrective regimen sliding scale   SubCutaneous at bedtime  metoprolol succinate  milliGRAM(s) Oral daily  mycophenolate mofetil 250 milliGRAM(s) Oral two times a day  NIFEdipine XL 60 milliGRAM(s) Oral daily  predniSONE   Tablet 5 milliGRAM(s) Oral daily  tacrolimus ER Tablet (ENVARSUS XR) 7 milliGRAM(s) Oral daily  tamsulosin 0.4 milliGRAM(s) Oral at bedtime    MEDICATIONS  (PRN):  acetaminophen   Tablet .. 650 milliGRAM(s) Oral every 6 hours PRN Temp greater or equal to 38.5C (101.3F), Mild Pain (1 - 3)      CAPILLARY BLOOD GLUCOSE      POCT Blood Glucose.: 175 mg/dL (05 Aug 2021 08:29)  POCT Blood Glucose.: 143 mg/dL (04 Aug 2021 21:38)  POCT Blood Glucose.: 145 mg/dL (04 Aug 2021 17:32)  POCT Blood Glucose.: 196 mg/dL (04 Aug 2021 12:33)    I&O's Summary    04 Aug 2021 07:01  -  05 Aug 2021 07:00  --------------------------------------------------------  IN: 0 mL / OUT: 1850 mL / NET: -1850 mL        PHYSICAL EXAM:  Vital Signs Last 24 Hrs  T(C): 36.8 (05 Aug 2021 05:22), Max: 36.8 (04 Aug 2021 13:51)  T(F): 98.2 (05 Aug 2021 05:22), Max: 98.3 (04 Aug 2021 13:51)  HR: 72 (05 Aug 2021 05:22) (69 - 73)  BP: 137/70 (05 Aug 2021 05:22) (101/68 - 137/70)  BP(mean): --  RR: 18 (05 Aug 2021 05:22) (18 - 18)  SpO2: 99% (05 Aug 2021 05:22) (96% - 99%)    GENERAL: NAD, lying comfortably in bed  HEAD: Atraumatic, normocephalic  EYES: EOMI b/l PERRLA b/l, conjunctiva and sclera clear  NECK: Supple, No JVD, No LAD  RESPIRATORY: Normal respiratory effort; lungs are clear to auscultation bilaterally  CARDIOVASCULAR: Regular rate and rhythm, normal S1 and S2, no murmur/rub/gallop; No lower extremity edema; Peripheral pulses are 2+ bilaterally  ABDOMEN: Nontender to palpation, normoactive bowel sounds, no rebound/guarding; No hepatosplenomegaly. Multiple scars from previous surgeries. abdominal hernia in right abdomen.   MUSCULOSKELETAL: no clubbing or cyanosis of digits; no joint swelling or tenderness to palpation  NEURO: Non focal   PSYCH: A+O to person, place, and time; affect appropriate    LABS:                        9.0    6.13  )-----------( 194      ( 05 Aug 2021 05:45 )             29.1     08-05    136  |  105  |  38<H>  ----------------------------<  120<H>  4.7   |  18<L>  |  2.98<H>    Ca    9.5      05 Aug 2021 05:45  Phos  2.9     08-05  Mg     1.7     08-05                GI PCR Panel, Stool (collected 02 Aug 2021 22:32)  Source: .Stool Feces  Final Report (03 Aug 2021 02:24):    GI PCR Results: NOT detected    *******Please Note:*******    GI panel PCR evaluates for:    Campylobacter, Plesiomonas shigelloides, Salmonella,    Vibrio, Yersinia enterocolitica, Enteroaggregative    Escherichia coli (EAEC), Enteropathogenic E.coli (EPEC),    Enterotoxigenic E. coli (ETEC) lt/st, Shiga-like    toxin-producing E. coli (STEC) stx1/stx2,    Shigella/ Enteroinvasive E. coli (EIEC), Cryptosporidium,    Cyclospora cayetanensis, Entamoeba histolytica,    Giardia lamblia, Adenovirus F 40/41, Astrovirus,    Norovirus GI/GII, Rotavirus A, Sapovirus

## 2021-08-06 LAB
ANION GAP SERPL CALC-SCNC: 10 MMOL/L — SIGNIFICANT CHANGE UP (ref 5–17)
BASOPHILS # BLD AUTO: 0.04 K/UL — SIGNIFICANT CHANGE UP (ref 0–0.2)
BASOPHILS NFR BLD AUTO: 0.7 % — SIGNIFICANT CHANGE UP (ref 0–2)
BUN SERPL-MCNC: 41 MG/DL — HIGH (ref 7–23)
CALCIUM SERPL-MCNC: 9.2 MG/DL — SIGNIFICANT CHANGE UP (ref 8.4–10.5)
CHLORIDE SERPL-SCNC: 110 MMOL/L — HIGH (ref 96–108)
CO2 SERPL-SCNC: 20 MMOL/L — LOW (ref 22–31)
CREAT SERPL-MCNC: 2.84 MG/DL — HIGH (ref 0.5–1.3)
EOSINOPHIL # BLD AUTO: 0.14 K/UL — SIGNIFICANT CHANGE UP (ref 0–0.5)
EOSINOPHIL NFR BLD AUTO: 2.4 % — SIGNIFICANT CHANGE UP (ref 0–6)
GLUCOSE BLDC GLUCOMTR-MCNC: 128 MG/DL — HIGH (ref 70–99)
GLUCOSE BLDC GLUCOMTR-MCNC: 143 MG/DL — HIGH (ref 70–99)
GLUCOSE BLDC GLUCOMTR-MCNC: 177 MG/DL — HIGH (ref 70–99)
GLUCOSE BLDC GLUCOMTR-MCNC: 192 MG/DL — HIGH (ref 70–99)
GLUCOSE SERPL-MCNC: 119 MG/DL — HIGH (ref 70–99)
HCT VFR BLD CALC: 29.2 % — LOW (ref 39–50)
HGB BLD-MCNC: 9 G/DL — LOW (ref 13–17)
IMM GRANULOCYTES NFR BLD AUTO: 3.7 % — HIGH (ref 0–1.5)
LYMPHOCYTES # BLD AUTO: 1.96 K/UL — SIGNIFICANT CHANGE UP (ref 1–3.3)
LYMPHOCYTES # BLD AUTO: 33.2 % — SIGNIFICANT CHANGE UP (ref 13–44)
MAGNESIUM SERPL-MCNC: 1.6 MG/DL — SIGNIFICANT CHANGE UP (ref 1.6–2.6)
MCHC RBC-ENTMCNC: 29.3 PG — SIGNIFICANT CHANGE UP (ref 27–34)
MCHC RBC-ENTMCNC: 30.8 GM/DL — LOW (ref 32–36)
MCV RBC AUTO: 95.1 FL — SIGNIFICANT CHANGE UP (ref 80–100)
MONOCYTES # BLD AUTO: 0.64 K/UL — SIGNIFICANT CHANGE UP (ref 0–0.9)
MONOCYTES NFR BLD AUTO: 10.8 % — SIGNIFICANT CHANGE UP (ref 2–14)
NEUTROPHILS # BLD AUTO: 2.9 K/UL — SIGNIFICANT CHANGE UP (ref 1.8–7.4)
NEUTROPHILS NFR BLD AUTO: 49.2 % — SIGNIFICANT CHANGE UP (ref 43–77)
NRBC # BLD: 0 /100 WBCS — SIGNIFICANT CHANGE UP (ref 0–0)
PHOSPHATE SERPL-MCNC: 3.1 MG/DL — SIGNIFICANT CHANGE UP (ref 2.5–4.5)
PLATELET # BLD AUTO: 194 K/UL — SIGNIFICANT CHANGE UP (ref 150–400)
POTASSIUM SERPL-MCNC: 5 MMOL/L — SIGNIFICANT CHANGE UP (ref 3.5–5.3)
POTASSIUM SERPL-SCNC: 5 MMOL/L — SIGNIFICANT CHANGE UP (ref 3.5–5.3)
RBC # BLD: 3.07 M/UL — LOW (ref 4.2–5.8)
RBC # FLD: 15.8 % — HIGH (ref 10.3–14.5)
SODIUM SERPL-SCNC: 140 MMOL/L — SIGNIFICANT CHANGE UP (ref 135–145)
TACROLIMUS SERPL-MCNC: 5.2 NG/ML — SIGNIFICANT CHANGE UP
WBC # BLD: 5.9 K/UL — SIGNIFICANT CHANGE UP (ref 3.8–10.5)
WBC # FLD AUTO: 5.9 K/UL — SIGNIFICANT CHANGE UP (ref 3.8–10.5)

## 2021-08-06 PROCEDURE — 99233 SBSQ HOSP IP/OBS HIGH 50: CPT | Mod: GC

## 2021-08-06 PROCEDURE — 99232 SBSQ HOSP IP/OBS MODERATE 35: CPT | Mod: GC

## 2021-08-06 PROCEDURE — 93010 ELECTROCARDIOGRAM REPORT: CPT

## 2021-08-06 RX ORDER — METOCLOPRAMIDE HCL 10 MG
5 TABLET ORAL
Refills: 0 | Status: DISCONTINUED | OUTPATIENT
Start: 2021-08-06 | End: 2021-08-07

## 2021-08-06 RX ADMIN — Medication 1: at 12:44

## 2021-08-06 RX ADMIN — MYCOPHENOLATE MOFETIL 250 MILLIGRAM(S): 250 CAPSULE ORAL at 05:48

## 2021-08-06 RX ADMIN — Medication 1 APPLICATION(S): at 05:47

## 2021-08-06 RX ADMIN — CEFEPIME 100 MILLIGRAM(S): 1 INJECTION, POWDER, FOR SOLUTION INTRAMUSCULAR; INTRAVENOUS at 12:43

## 2021-08-06 RX ADMIN — Medication 100 MILLIGRAM(S): at 12:44

## 2021-08-06 RX ADMIN — HEPARIN SODIUM 5000 UNIT(S): 5000 INJECTION INTRAVENOUS; SUBCUTANEOUS at 21:55

## 2021-08-06 RX ADMIN — Medication 15 MILLILITER(S): at 05:46

## 2021-08-06 RX ADMIN — Medication 5 MILLIGRAM(S): at 05:48

## 2021-08-06 RX ADMIN — Medication 15 MILLILITER(S): at 21:56

## 2021-08-06 RX ADMIN — Medication 15 MILLILITER(S): at 12:44

## 2021-08-06 RX ADMIN — MYCOPHENOLATE MOFETIL 250 MILLIGRAM(S): 250 CAPSULE ORAL at 18:19

## 2021-08-06 RX ADMIN — Medication 650 MILLIGRAM(S): at 18:19

## 2021-08-06 RX ADMIN — Medication 5 MILLIGRAM(S): at 18:19

## 2021-08-06 RX ADMIN — HEPARIN SODIUM 5000 UNIT(S): 5000 INJECTION INTRAVENOUS; SUBCUTANEOUS at 12:44

## 2021-08-06 RX ADMIN — Medication 1: at 08:27

## 2021-08-06 RX ADMIN — Medication 1 APPLICATION(S): at 18:35

## 2021-08-06 RX ADMIN — HEPARIN SODIUM 5000 UNIT(S): 5000 INJECTION INTRAVENOUS; SUBCUTANEOUS at 05:46

## 2021-08-06 RX ADMIN — TAMSULOSIN HYDROCHLORIDE 0.4 MILLIGRAM(S): 0.4 CAPSULE ORAL at 21:57

## 2021-08-06 RX ADMIN — VALGANCICLOVIR 450 MILLIGRAM(S): 450 TABLET, FILM COATED ORAL at 12:44

## 2021-08-06 RX ADMIN — Medication 60 MILLIGRAM(S): at 05:48

## 2021-08-06 RX ADMIN — Medication 100 MILLIGRAM(S): at 05:47

## 2021-08-06 RX ADMIN — TACROLIMUS 7 MILLIGRAM(S): 5 CAPSULE ORAL at 05:47

## 2021-08-06 RX ADMIN — FINASTERIDE 5 MILLIGRAM(S): 5 TABLET, FILM COATED ORAL at 12:44

## 2021-08-06 NOTE — PROGRESS NOTE ADULT - SUBJECTIVE AND OBJECTIVE BOX
PROGRESS NOTE:   Authored by Dr. Ketan Kraft pager [facility]: [phone]    Patient is a 70y old  Male who presents with a chief complaint of Urinary Retention (05 Aug 2021 16:22)      SUBJECTIVE / OVERNIGHT EVENTS:    ADDITIONAL REVIEW OF SYSTEMS:    MEDICATIONS  (STANDING):  allopurinol 100 milliGRAM(s) Oral daily  cefepime   IVPB 1000 milliGRAM(s) IV Intermittent every 24 hours  cefepime   IVPB      citric acid/sodium citrate Solution 15 milliLiter(s) Oral three times a day  dextrose 40% Gel 15 Gram(s) Oral once  dextrose 5%. 1000 milliLiter(s) (50 mL/Hr) IV Continuous <Continuous>  dextrose 5%. 1000 milliLiter(s) (100 mL/Hr) IV Continuous <Continuous>  dextrose 50% Injectable 12.5 Gram(s) IV Push once  dextrose 50% Injectable 25 Gram(s) IV Push once  dextrose 50% Injectable 25 Gram(s) IV Push once  finasteride 5 milliGRAM(s) Oral daily  glucagon  Injectable 1 milliGRAM(s) IntraMuscular once  hemorrhoidal Ointment 1 Application(s) Rectal every 8 hours  heparin   Injectable 5000 Unit(s) SubCutaneous every 8 hours  insulin lispro (ADMELOG) corrective regimen sliding scale   SubCutaneous three times a day before meals  insulin lispro (ADMELOG) corrective regimen sliding scale   SubCutaneous at bedtime  metoprolol succinate  milliGRAM(s) Oral daily  mycophenolate mofetil 250 milliGRAM(s) Oral two times a day  NIFEdipine XL 60 milliGRAM(s) Oral daily  nitroglycerin  0.2% Ointment Compound 1 Application(s) Rectal two times a day  predniSONE   Tablet 5 milliGRAM(s) Oral daily  tacrolimus ER Tablet (ENVARSUS XR) 7 milliGRAM(s) Oral daily  tamsulosin 0.4 milliGRAM(s) Oral at bedtime  valGANciclovir 450 milliGRAM(s) Oral daily    MEDICATIONS  (PRN):  acetaminophen   Tablet .. 650 milliGRAM(s) Oral every 6 hours PRN Temp greater or equal to 38.5C (101.3F), Mild Pain (1 - 3)      CAPILLARY BLOOD GLUCOSE      POCT Blood Glucose.: 177 mg/dL (06 Aug 2021 08:17)  POCT Blood Glucose.: 149 mg/dL (05 Aug 2021 22:34)  POCT Blood Glucose.: 181 mg/dL (05 Aug 2021 17:18)  POCT Blood Glucose.: 200 mg/dL (05 Aug 2021 12:27)    I&O's Summary    05 Aug 2021 07:01  -  06 Aug 2021 07:00  --------------------------------------------------------  IN: 0 mL / OUT: 1925 mL / NET: -1925 mL        PHYSICAL EXAM:  Vital Signs Last 24 Hrs  T(C): 36.3 (06 Aug 2021 05:22), Max: 36.8 (05 Aug 2021 13:45)  T(F): 97.4 (06 Aug 2021 05:22), Max: 98.3 (05 Aug 2021 13:45)  HR: 61 (06 Aug 2021 05:22) (60 - 74)  BP: 149/67 (06 Aug 2021 05:22) (112/62 - 166/79)  BP(mean): --  RR: 18 (06 Aug 2021 05:22) (18 - 18)  SpO2: 100% (06 Aug 2021 05:22) (98% - 100%)  GENERAL: NAD, lying comfortably in bed  HEAD: Atraumatic, normocephalic  EYES: EOMI b/l PERRLA b/l, conjunctiva and sclera clear  NECK: Supple, No JVD, No LAD  RESPIRATORY: Normal respiratory effort; lungs are clear to auscultation bilaterally  CARDIOVASCULAR: Regular rate and rhythm, normal S1 and S2, no murmur/rub/gallop; No lower extremity edema; Peripheral pulses are 2+ bilaterally  ABDOMEN: Nontender to palpation, normoactive bowel sounds, no rebound/guarding; No hepatosplenomegaly  MUSCLOSKELETAL: no clubbing or cyanosis of digits; no joint swelling or tenderness to palpation  NEURO: Non focal   PSYCH: A+O to person, place, and time; affect appropriate    LABS:                        9.0    5.90  )-----------( 194      ( 06 Aug 2021 05:30 )             29.2     08-06    140  |  110<H>  |  41<H>  ----------------------------<  119<H>  5.0   |  20<L>  |  2.84<H>    Ca    9.2      06 Aug 2021 05:30  Phos  3.1     08-06  Mg     1.6     08-06                  Tele Reviewed:    RADIOLOGY & ADDITIONAL TESTS:  Results Reviewed:   Imaging Personally Reviewed:  Electrocardiogram Personally Reviewed:    COORDINATION OF CARE:  Care Discussed with Consultants/Other Providers [Y/N]:  Prior or Outpatient Records Reviewed [Y/N]:   PROGRESS NOTE:   Authored by Dr. Ketan Kraft pager [facility]: [phone]    Patient is a 70y old  Male who presents with a chief complaint of Urinary Retention (05 Aug 2021 16:22)      SUBJECTIVE / OVERNIGHT EVENTS: No overnight events.     ADDITIONAL REVIEW OF SYSTEMS:    MEDICATIONS  (STANDING):  allopurinol 100 milliGRAM(s) Oral daily  cefepime   IVPB 1000 milliGRAM(s) IV Intermittent every 24 hours  cefepime   IVPB      citric acid/sodium citrate Solution 15 milliLiter(s) Oral three times a day  dextrose 40% Gel 15 Gram(s) Oral once  dextrose 5%. 1000 milliLiter(s) (50 mL/Hr) IV Continuous <Continuous>  dextrose 5%. 1000 milliLiter(s) (100 mL/Hr) IV Continuous <Continuous>  dextrose 50% Injectable 12.5 Gram(s) IV Push once  dextrose 50% Injectable 25 Gram(s) IV Push once  dextrose 50% Injectable 25 Gram(s) IV Push once  finasteride 5 milliGRAM(s) Oral daily  glucagon  Injectable 1 milliGRAM(s) IntraMuscular once  hemorrhoidal Ointment 1 Application(s) Rectal every 8 hours  heparin   Injectable 5000 Unit(s) SubCutaneous every 8 hours  insulin lispro (ADMELOG) corrective regimen sliding scale   SubCutaneous three times a day before meals  insulin lispro (ADMELOG) corrective regimen sliding scale   SubCutaneous at bedtime  metoprolol succinate  milliGRAM(s) Oral daily  mycophenolate mofetil 250 milliGRAM(s) Oral two times a day  NIFEdipine XL 60 milliGRAM(s) Oral daily  nitroglycerin  0.2% Ointment Compound 1 Application(s) Rectal two times a day  predniSONE   Tablet 5 milliGRAM(s) Oral daily  tacrolimus ER Tablet (ENVARSUS XR) 7 milliGRAM(s) Oral daily  tamsulosin 0.4 milliGRAM(s) Oral at bedtime  valGANciclovir 450 milliGRAM(s) Oral daily    MEDICATIONS  (PRN):  acetaminophen   Tablet .. 650 milliGRAM(s) Oral every 6 hours PRN Temp greater or equal to 38.5C (101.3F), Mild Pain (1 - 3)      CAPILLARY BLOOD GLUCOSE      POCT Blood Glucose.: 177 mg/dL (06 Aug 2021 08:17)  POCT Blood Glucose.: 149 mg/dL (05 Aug 2021 22:34)  POCT Blood Glucose.: 181 mg/dL (05 Aug 2021 17:18)  POCT Blood Glucose.: 200 mg/dL (05 Aug 2021 12:27)    I&O's Summary    05 Aug 2021 07:01  -  06 Aug 2021 07:00  --------------------------------------------------------  IN: 0 mL / OUT: 1925 mL / NET: -1925 mL        PHYSICAL EXAM:  Vital Signs Last 24 Hrs  T(C): 36.3 (06 Aug 2021 05:22), Max: 36.8 (05 Aug 2021 13:45)  T(F): 97.4 (06 Aug 2021 05:22), Max: 98.3 (05 Aug 2021 13:45)  HR: 61 (06 Aug 2021 05:22) (60 - 74)  BP: 149/67 (06 Aug 2021 05:22) (112/62 - 166/79)  BP(mean): --  RR: 18 (06 Aug 2021 05:22) (18 - 18)  SpO2: 100% (06 Aug 2021 05:22) (98% - 100%)  GENERAL: NAD, lying comfortably in bed  HEAD: Atraumatic, normocephalic  EYES: EOMI b/l PERRLA b/l, conjunctiva and sclera clear  NECK: Supple, No JVD, No LAD  RESPIRATORY: Normal respiratory effort; lungs are clear to auscultation bilaterally  CARDIOVASCULAR: Regular rate and rhythm, normal S1 and S2, no murmur/rub/gallop; No lower extremity edema; Peripheral pulses are 2+ bilaterally  ABDOMEN: Nontender to palpation, normoactive bowel sounds, no rebound/guarding; No hepatosplenomegaly  MUSCLOSKELETAL: no clubbing or cyanosis of digits; no joint swelling or tenderness to palpation  NEURO: Non focal   PSYCH: A+O to person, place, and time; affect appropriate    LABS:                        9.0    5.90  )-----------( 194      ( 06 Aug 2021 05:30 )             29.2     08-06    140  |  110<H>  |  41<H>  ----------------------------<  119<H>  5.0   |  20<L>  |  2.84<H>    Ca    9.2      06 Aug 2021 05:30  Phos  3.1     08-06  Mg     1.6     08-06                  Tele Reviewed:    RADIOLOGY & ADDITIONAL TESTS:  Results Reviewed:   Imaging Personally Reviewed:  Electrocardiogram Personally Reviewed:    COORDINATION OF CARE:  Care Discussed with Consultants/Other Providers [Y/N]:  Prior or Outpatient Records Reviewed [Y/N]:   PROGRESS NOTE:   Authored by Dr. Ketan Kraft pager [facility]: [phone]    Patient is a 70y old  Male who presents with a chief complaint of Urinary Retention (05 Aug 2021 16:22)      SUBJECTIVE / OVERNIGHT EVENTS: No overnight events. Patient reported feeling well.    ADDITIONAL REVIEW OF SYSTEMS:  Review of Systems:  Constitutional: No fever, No weight loss, good appetite/po intake  Head: No headache   Eyes: No blurry vision, No diplopia  Neuro: No tremors, No muscle weakness   Cardiovascular: No chest pain, No palpitations  Respiratory: No SOB, No cough  GI: No nausea, No vomiting, + diarrhea, + anal pain on defecation  : No dysuria, No hematuria  Skin: No rash  MSK: No joint pain   Psych: No depression      MEDICATIONS  (STANDING):  allopurinol 100 milliGRAM(s) Oral daily  cefepime   IVPB 1000 milliGRAM(s) IV Intermittent every 24 hours  cefepime   IVPB      citric acid/sodium citrate Solution 15 milliLiter(s) Oral three times a day  dextrose 40% Gel 15 Gram(s) Oral once  dextrose 5%. 1000 milliLiter(s) (50 mL/Hr) IV Continuous <Continuous>  dextrose 5%. 1000 milliLiter(s) (100 mL/Hr) IV Continuous <Continuous>  dextrose 50% Injectable 12.5 Gram(s) IV Push once  dextrose 50% Injectable 25 Gram(s) IV Push once  dextrose 50% Injectable 25 Gram(s) IV Push once  finasteride 5 milliGRAM(s) Oral daily  glucagon  Injectable 1 milliGRAM(s) IntraMuscular once  hemorrhoidal Ointment 1 Application(s) Rectal every 8 hours  heparin   Injectable 5000 Unit(s) SubCutaneous every 8 hours  insulin lispro (ADMELOG) corrective regimen sliding scale   SubCutaneous three times a day before meals  insulin lispro (ADMELOG) corrective regimen sliding scale   SubCutaneous at bedtime  metoprolol succinate  milliGRAM(s) Oral daily  mycophenolate mofetil 250 milliGRAM(s) Oral two times a day  NIFEdipine XL 60 milliGRAM(s) Oral daily  nitroglycerin  0.2% Ointment Compound 1 Application(s) Rectal two times a day  predniSONE   Tablet 5 milliGRAM(s) Oral daily  tacrolimus ER Tablet (ENVARSUS XR) 7 milliGRAM(s) Oral daily  tamsulosin 0.4 milliGRAM(s) Oral at bedtime  valGANciclovir 450 milliGRAM(s) Oral daily    MEDICATIONS  (PRN):  acetaminophen   Tablet .. 650 milliGRAM(s) Oral every 6 hours PRN Temp greater or equal to 38.5C (101.3F), Mild Pain (1 - 3)      CAPILLARY BLOOD GLUCOSE      POCT Blood Glucose.: 177 mg/dL (06 Aug 2021 08:17)  POCT Blood Glucose.: 149 mg/dL (05 Aug 2021 22:34)  POCT Blood Glucose.: 181 mg/dL (05 Aug 2021 17:18)  POCT Blood Glucose.: 200 mg/dL (05 Aug 2021 12:27)    I&O's Summary    05 Aug 2021 07:01  -  06 Aug 2021 07:00  --------------------------------------------------------  IN: 0 mL / OUT: 1925 mL / NET: -1925 mL        PHYSICAL EXAM:  Vital Signs Last 24 Hrs  T(C): 36.3 (06 Aug 2021 05:22), Max: 36.8 (05 Aug 2021 13:45)  T(F): 97.4 (06 Aug 2021 05:22), Max: 98.3 (05 Aug 2021 13:45)  HR: 61 (06 Aug 2021 05:22) (60 - 74)  BP: 149/67 (06 Aug 2021 05:22) (112/62 - 166/79)  BP(mean): --  RR: 18 (06 Aug 2021 05:22) (18 - 18)  SpO2: 100% (06 Aug 2021 05:22) (98% - 100%)    GENERAL: NAD, lying comfortably in bed  HEAD: Atraumatic, normocephalic  EYES: EOMI b/l PERRLA b/l, conjunctiva and sclera clear  NECK: Supple, No JVD, No LAD  RESPIRATORY: Normal respiratory effort; lungs are clear to auscultation bilaterally  CARDIOVASCULAR: Regular rate and rhythm, normal S1 and S2, no murmur/rub/gallop; No lower extremity edema; Peripheral pulses are 2+ bilaterally  ABDOMEN: Nontender to palpation, normoactive bowel sounds, no rebound/guarding; No hepatosplenomegaly  MUSCLOSKELETAL: no clubbing or cyanosis of digits; no joint swelling or tenderness to palpation  NEURO: Non focal   PSYCH: A+O to person, place, and time; affect appropriate    LABS:                        9.0    5.90  )-----------( 194      ( 06 Aug 2021 05:30 )             29.2     08-06    140  |  110<H>  |  41<H>  ----------------------------<  119<H>  5.0   |  20<L>  |  2.84<H>    Ca    9.2      06 Aug 2021 05:30  Phos  3.1     08-06  Mg     1.6     08-06

## 2021-08-06 NOTE — PROGRESS NOTE ADULT - PROBLEM SELECTOR PLAN 4
-Resume home dose of envarsus 7 mg PO daily.  -Resume home dose of  mg PO BID.  -Resume home dose of prednisone 5 mg PO daily.  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 Continue allopurinol.

## 2021-08-06 NOTE — PROGRESS NOTE ADULT - PROBLEM SELECTOR PLAN 3
Patient presented with urinary retention and UTI. UTI being appropriately treated but retention continues.  - TOV started, patient needing straight cath with post void volumes of 500cc-1000cc  - Finasteride and Tamsulosin  - Will recontact URO ? Patient presented with urinary retention and UTI. UTI being appropriately treated but retention continues.  - TOV started, patient needing straight cath with post void volumes of 500cc-1000cc  - Finasteride and Tamsulosin  - Will recontact URO

## 2021-08-06 NOTE — PROGRESS NOTE ADULT - PROBLEM SELECTOR PLAN 2
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 Has chronic ureteral stent as well which may be a nidus for infection.  -Started cefepime 1g IV q24h (renal dosing). May need to adjust as creatinine improves.  -c/w CEFEPIME day 5/7 (started 08/1)  -UCx: Pseudomonas, resistant to PO meds. c/w IV ABx  -Case discussed with urology PA - recommending IR evaluation if decision is made to remove or replace stent, as they placed stent initially  - Patient continues to have retention iso UTI receiving appropriate therapy. 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 Has chronic ureteral stent as well which may be a nidus for infection.  -Started cefepime 1g IV q24h (renal dosing). May need to adjust as creatinine improves.  -c/w CEFEPIME day 5/7 (started 08/1)  -UCx: Pseudomonas, resistant to PO meds. c/w IV ABx  -Case discussed with urology PA - recommending IR evaluation if decision is made to remove or replace stent, as they placed stent initially

## 2021-08-06 NOTE — PROGRESS NOTE ADULT - SUBJECTIVE AND OBJECTIVE BOX
[facility] DIVISION OF KIDNEY DISEASES AND HYPERTENSION -- FOLLOW UP NOTE  --------------------------------------------------------------------------------    24 hour events/subjective: Patient was seen and examined at bedside. Reported feeling well. Denies CP, SOB, fever, chills, nausea, vomiting, diarrhea, LE edema.    PAST HISTORY  --------------------------------------------------------------------------------  No significant changes to PMH, PSH, FHx, SHx, unless otherwise noted    ALLERGIES & MEDICATIONS  --------------------------------------------------------------------------------  Allergies    No Known Allergies    Intolerances      Standing Inpatient Medications  allopurinol 100 milliGRAM(s) Oral daily  cefepime   IVPB 1000 milliGRAM(s) IV Intermittent every 24 hours  cefepime   IVPB      citric acid/sodium citrate Solution 15 milliLiter(s) Oral three times a day  dextrose 40% Gel 15 Gram(s) Oral once  dextrose 5%. 1000 milliLiter(s) IV Continuous <Continuous>  dextrose 5%. 1000 milliLiter(s) IV Continuous <Continuous>  dextrose 50% Injectable 25 Gram(s) IV Push once  dextrose 50% Injectable 12.5 Gram(s) IV Push once  dextrose 50% Injectable 25 Gram(s) IV Push once  finasteride 5 milliGRAM(s) Oral daily  glucagon  Injectable 1 milliGRAM(s) IntraMuscular once  hemorrhoidal Ointment 1 Application(s) Rectal every 8 hours  heparin   Injectable 5000 Unit(s) SubCutaneous every 8 hours  insulin lispro (ADMELOG) corrective regimen sliding scale   SubCutaneous three times a day before meals  insulin lispro (ADMELOG) corrective regimen sliding scale   SubCutaneous at bedtime  metoprolol succinate  milliGRAM(s) Oral daily  mycophenolate mofetil 250 milliGRAM(s) Oral two times a day  NIFEdipine XL 60 milliGRAM(s) Oral daily  nitroglycerin  0.2% Ointment Compound 1 Application(s) Rectal two times a day  predniSONE   Tablet 5 milliGRAM(s) Oral daily  tacrolimus ER Tablet (ENVARSUS XR) 7 milliGRAM(s) Oral daily  tamsulosin 0.4 milliGRAM(s) Oral at bedtime  valGANciclovir 450 milliGRAM(s) Oral daily    PRN Inpatient Medications  acetaminophen   Tablet .. 650 milliGRAM(s) Oral every 6 hours PRN      REVIEW OF SYSTEMS  --------------------------------------------------------------------------------  Gen: No fevers/chills  Respiratory: No dyspnea, cough,   CV: No chest pain, PND, orthopnea  GI: No abdominal pain, diarrhea, constipation, nausea, vomiting  Transplant: No pain  : No increased frequency, dysuria, hematuria   MSK: No edema  Neuro: No dizziness/lightheadedness    All other systems were reviewed and are negative, except as noted.    VITALS/PHYSICAL EXAM  --------------------------------------------------------------------------------  T(C): 36.3 (08-06-21 @ 05:22), Max: 36.8 (08-05-21 @ 13:45)  HR: 61 (08-06-21 @ 05:22) (60 - 74)  BP: 149/67 (08-06-21 @ 05:22) (112/62 - 166/79)  RR: 18 (08-06-21 @ 05:22) (18 - 18)  SpO2: 100% (08-06-21 @ 05:22) (98% - 100%)  Wt(kg): --        08-05-21 @ 07:01  -  08-06-21 @ 07:00  --------------------------------------------------------  IN: 0 mL / OUT: 2825 mL / NET: -2825 mL    08-06-21 @ 07:01  -  08-06-21 @ 13:42  --------------------------------------------------------  IN: 0 mL / OUT: 1050 mL / NET: -1050 mL      Physical Exam:  	Gen: NAD, able to speak in full sentences   	HEENT: PERRL, MMM   	Pulm: CTA B/L, no crackles   	CV: RRR, S1S2+  	Abd: +BS, soft          Transplant: No tenderness, swelling  	: No suprapubic tenderness, armenta catheter with clear yellow urine   	MSK: no edema   	Psych: Normal affect and mood  	Skin: Warm    LABS/STUDIES  --------------------------------------------------------------------------------              9.0    5.90  >-----------<  194      [08-06-21 @ 05:30]              29.2     140  |  110  |  41  ----------------------------<  119      [08-06-21 @ 05:30]  5.0   |  20  |  2.84        Ca     9.2     [08-06-21 @ 05:30]      Mg     1.6     [08-06-21 @ 05:30]      Phos  3.1     [08-06-21 @ 05:30]            Creatinine Trend:  SCr 2.84 [08-06 @ 05:30]  SCr 2.98 [08-05 @ 05:45]  SCr 3.23 [08-04 @ 16:14]  SCr 3.35 [08-04 @ 08:34]  SCr 3.77 [08-03 @ 06:08]    Tacrolimus (), Serum: 5.2 ng/mL (08-06 @ 06:02)  Tacrolimus (), Serum: 4.2 ng/mL (08-05 @ 07:25)  Tacrolimus (), Serum: 7.2 ng/mL (08-04 @ 07:21)  Tacrolimus (), Serum: 6.6 ng/mL (08-03 @ 07:17)            Urinalysis - [08-01-21 @ 06:31]      Color Yellow / Appearance Slightly Turbid / SG 1.015 / pH 6.5      Gluc Negative / Ketone Negative  / Bili Negative / Urobili Negative       Blood Small / Protein 300 mg/dL / Leuk Est Large / Nitrite Positive      RBC 8 /  / Hyaline 0 / Gran  / Sq Epi  / Non Sq Epi 1 / Bacteria Negative      Iron 75, TIBC 278, %sat 27      [06-02-21 @ 16:51]  Ferritin 450      [06-02-21 @ 17:06]  PTH -- (Ca 10.3)      [06-04-21 @ 09:37]   22  PTH -- (Ca --)      [06-02-21 @ 17:06]   15  Vitamin D (25OH) 37.0      [06-04-21 @ 09:37]  HbA1c 6.0      [08-09-19 @ 23:48]

## 2021-08-06 NOTE — PROGRESS NOTE ADULT - PROBLEM SELECTOR PLAN 5
Unclear if this is fulminant diarrhea or constipation with overflow. Patients symptoms and presentation are atypical.  -Hold off on adding any stool softners/laxatives/anti-diarrheals.rs.  - GI PCR negative. 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 Unclear if this is fulminant diarrhea or constipation with overflow. Patients symptoms and presentation are atypical.  -Hold off on adding any stool softners/laxatives/anti-diarrheals  - GI PCR negative. 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    - Patient reporting insufficient stooling.  - Started metoclopramide

## 2021-08-06 NOTE — PROGRESS NOTE ADULT - PROBLEM SELECTOR PLAN 1
with baseline creatinine in the 2-range, suspect CKD stage III as well.  Etiology most likely secondary to urinary retention  - Improving renal function today: Scr3 2.98  - TOV started. Might need indwelling Velez for continued urinary retention needing straigh cath  - Started finesteride iso of BPPH hx, c/w Tamsulosin  -Monitor Is and Os.  -Avoid nephrotoxic medications.  -Hyperkalemia self-resolved with baseline creatinine in the 2-range, suspect CKD stage III as well.  Etiology most likely secondary to urinary retention  - Improving renal function today  - indwelling  Velez in place after failing TOV  - Started finesteride iso of BPPH hx, c/w Tamsulosin  -Monitor Is and Os.  -Avoid nephrotoxic medications.

## 2021-08-07 ENCOUNTER — TRANSCRIPTION ENCOUNTER (OUTPATIENT)
Age: 71
End: 2021-08-07

## 2021-08-07 VITALS
SYSTOLIC BLOOD PRESSURE: 149 MMHG | DIASTOLIC BLOOD PRESSURE: 66 MMHG | HEART RATE: 76 BPM | RESPIRATION RATE: 16 BRPM | TEMPERATURE: 98 F | OXYGEN SATURATION: 97 %

## 2021-08-07 DIAGNOSIS — Z94.0 KIDNEY TRANSPLANT STATUS: ICD-10-CM

## 2021-08-07 DIAGNOSIS — N39.0 URINARY TRACT INFECTION, SITE NOT SPECIFIED: ICD-10-CM

## 2021-08-07 DIAGNOSIS — D84.9 IMMUNODEFICIENCY, UNSPECIFIED: ICD-10-CM

## 2021-08-07 LAB
ANION GAP SERPL CALC-SCNC: 10 MMOL/L — SIGNIFICANT CHANGE UP (ref 5–17)
BUN SERPL-MCNC: 37 MG/DL — HIGH (ref 7–23)
CALCIUM SERPL-MCNC: 10.2 MG/DL — SIGNIFICANT CHANGE UP (ref 8.4–10.5)
CHLORIDE SERPL-SCNC: 108 MMOL/L — SIGNIFICANT CHANGE UP (ref 96–108)
CMV DNA CSF QL NAA+PROBE: SIGNIFICANT CHANGE UP
CO2 SERPL-SCNC: 19 MMOL/L — LOW (ref 22–31)
CREAT SERPL-MCNC: 2.59 MG/DL — HIGH (ref 0.5–1.3)
GLUCOSE BLDC GLUCOMTR-MCNC: 160 MG/DL — HIGH (ref 70–99)
GLUCOSE BLDC GLUCOMTR-MCNC: 230 MG/DL — HIGH (ref 70–99)
GLUCOSE SERPL-MCNC: 109 MG/DL — HIGH (ref 70–99)
HCT VFR BLD CALC: 30 % — LOW (ref 39–50)
HGB BLD-MCNC: 9 G/DL — LOW (ref 13–17)
MAGNESIUM SERPL-MCNC: 1.6 MG/DL — SIGNIFICANT CHANGE UP (ref 1.6–2.6)
MCHC RBC-ENTMCNC: 29.2 PG — SIGNIFICANT CHANGE UP (ref 27–34)
MCHC RBC-ENTMCNC: 30 GM/DL — LOW (ref 32–36)
MCV RBC AUTO: 97.4 FL — SIGNIFICANT CHANGE UP (ref 80–100)
NRBC # BLD: 0 /100 WBCS — SIGNIFICANT CHANGE UP (ref 0–0)
PHOSPHATE SERPL-MCNC: 3.4 MG/DL — SIGNIFICANT CHANGE UP (ref 2.5–4.5)
PLATELET # BLD AUTO: 189 K/UL — SIGNIFICANT CHANGE UP (ref 150–400)
POTASSIUM SERPL-MCNC: 4.9 MMOL/L — SIGNIFICANT CHANGE UP (ref 3.5–5.3)
POTASSIUM SERPL-SCNC: 4.9 MMOL/L — SIGNIFICANT CHANGE UP (ref 3.5–5.3)
RBC # BLD: 3.08 M/UL — LOW (ref 4.2–5.8)
RBC # FLD: 16.1 % — HIGH (ref 10.3–14.5)
SODIUM SERPL-SCNC: 137 MMOL/L — SIGNIFICANT CHANGE UP (ref 135–145)
TACROLIMUS SERPL-MCNC: 4.4 NG/ML — SIGNIFICANT CHANGE UP
WBC # BLD: 5.92 K/UL — SIGNIFICANT CHANGE UP (ref 3.8–10.5)
WBC # FLD AUTO: 5.92 K/UL — SIGNIFICANT CHANGE UP (ref 3.8–10.5)

## 2021-08-07 PROCEDURE — 87086 URINE CULTURE/COLONY COUNT: CPT

## 2021-08-07 PROCEDURE — 74018 RADEX ABDOMEN 1 VIEW: CPT

## 2021-08-07 PROCEDURE — 87077 CULTURE AEROBIC IDENTIFY: CPT

## 2021-08-07 PROCEDURE — 83735 ASSAY OF MAGNESIUM: CPT

## 2021-08-07 PROCEDURE — 84100 ASSAY OF PHOSPHORUS: CPT

## 2021-08-07 PROCEDURE — 99239 HOSP IP/OBS DSCHRG MGMT >30: CPT

## 2021-08-07 PROCEDURE — 80053 COMPREHEN METABOLIC PANEL: CPT

## 2021-08-07 PROCEDURE — 82962 GLUCOSE BLOOD TEST: CPT

## 2021-08-07 PROCEDURE — 85025 COMPLETE CBC W/AUTO DIFF WBC: CPT

## 2021-08-07 PROCEDURE — 87507 IADNA-DNA/RNA PROBE TQ 12-25: CPT

## 2021-08-07 PROCEDURE — 80048 BASIC METABOLIC PNL TOTAL CA: CPT

## 2021-08-07 PROCEDURE — 99232 SBSQ HOSP IP/OBS MODERATE 35: CPT

## 2021-08-07 PROCEDURE — 86769 SARS-COV-2 COVID-19 ANTIBODY: CPT

## 2021-08-07 PROCEDURE — 99285 EMERGENCY DEPT VISIT HI MDM: CPT

## 2021-08-07 PROCEDURE — 76857 US EXAM PELVIC LIMITED: CPT

## 2021-08-07 PROCEDURE — 85027 COMPLETE CBC AUTOMATED: CPT

## 2021-08-07 PROCEDURE — 97161 PT EVAL LOW COMPLEX 20 MIN: CPT

## 2021-08-07 PROCEDURE — 93005 ELECTROCARDIOGRAM TRACING: CPT

## 2021-08-07 PROCEDURE — 76937 US GUIDE VASCULAR ACCESS: CPT

## 2021-08-07 PROCEDURE — 87186 SC STD MICRODIL/AGAR DIL: CPT

## 2021-08-07 PROCEDURE — 76776 US EXAM K TRANSPL W/DOPPLER: CPT

## 2021-08-07 PROCEDURE — 80197 ASSAY OF TACROLIMUS: CPT

## 2021-08-07 PROCEDURE — U0005: CPT

## 2021-08-07 PROCEDURE — 81001 URINALYSIS AUTO W/SCOPE: CPT

## 2021-08-07 PROCEDURE — U0003: CPT

## 2021-08-07 PROCEDURE — 83036 HEMOGLOBIN GLYCOSYLATED A1C: CPT

## 2021-08-07 RX ORDER — VALGANCICLOVIR 450 MG/1
1 TABLET, FILM COATED ORAL
Qty: 0 | Refills: 0 | DISCHARGE
Start: 2021-08-07

## 2021-08-07 RX ORDER — TACROLIMUS 5 MG/1
1 CAPSULE ORAL
Qty: 30 | Refills: 0
Start: 2021-08-07 | End: 2021-09-05

## 2021-08-07 RX ORDER — TACROLIMUS 5 MG/1
3 CAPSULE ORAL
Qty: 90 | Refills: 0
Start: 2021-08-07 | End: 2021-09-05

## 2021-08-07 RX ORDER — METOCLOPRAMIDE HCL 10 MG
1 TABLET ORAL
Qty: 0 | Refills: 0 | DISCHARGE
Start: 2021-08-07

## 2021-08-07 RX ORDER — FINASTERIDE 5 MG/1
1 TABLET, FILM COATED ORAL
Qty: 14 | Refills: 0
Start: 2021-08-07 | End: 2021-08-20

## 2021-08-07 RX ORDER — FINASTERIDE 5 MG/1
1 TABLET, FILM COATED ORAL
Qty: 0 | Refills: 0 | DISCHARGE
Start: 2021-08-07

## 2021-08-07 RX ORDER — TACROLIMUS 5 MG/1
7 CAPSULE ORAL
Qty: 0 | Refills: 0 | DISCHARGE

## 2021-08-07 RX ORDER — VALGANCICLOVIR 450 MG/1
1 TABLET, FILM COATED ORAL
Qty: 30 | Refills: 0
Start: 2021-08-07 | End: 2021-09-05

## 2021-08-07 RX ADMIN — TACROLIMUS 7 MILLIGRAM(S): 5 CAPSULE ORAL at 08:56

## 2021-08-07 RX ADMIN — Medication 5 MILLIGRAM(S): at 01:12

## 2021-08-07 RX ADMIN — Medication 100 MILLIGRAM(S): at 12:33

## 2021-08-07 RX ADMIN — Medication 1: at 08:55

## 2021-08-07 RX ADMIN — HEPARIN SODIUM 5000 UNIT(S): 5000 INJECTION INTRAVENOUS; SUBCUTANEOUS at 06:19

## 2021-08-07 RX ADMIN — Medication 1 APPLICATION(S): at 06:19

## 2021-08-07 RX ADMIN — FINASTERIDE 5 MILLIGRAM(S): 5 TABLET, FILM COATED ORAL at 12:33

## 2021-08-07 RX ADMIN — Medication 100 MILLIGRAM(S): at 06:20

## 2021-08-07 RX ADMIN — Medication 5 MILLIGRAM(S): at 12:34

## 2021-08-07 RX ADMIN — Medication 60 MILLIGRAM(S): at 06:20

## 2021-08-07 RX ADMIN — PHENYLEPHRINE-SHARK LIVER OIL-MINERAL OIL-PETROLATUM RECTAL OINTMENT 1 APPLICATION(S): at 06:23

## 2021-08-07 RX ADMIN — Medication 15 MILLILITER(S): at 06:19

## 2021-08-07 RX ADMIN — VALGANCICLOVIR 450 MILLIGRAM(S): 450 TABLET, FILM COATED ORAL at 12:33

## 2021-08-07 RX ADMIN — HEPARIN SODIUM 5000 UNIT(S): 5000 INJECTION INTRAVENOUS; SUBCUTANEOUS at 12:34

## 2021-08-07 RX ADMIN — Medication 15 MILLILITER(S): at 13:09

## 2021-08-07 RX ADMIN — Medication 5 MILLIGRAM(S): at 06:22

## 2021-08-07 RX ADMIN — Medication 2: at 12:33

## 2021-08-07 RX ADMIN — CEFEPIME 100 MILLIGRAM(S): 1 INJECTION, POWDER, FOR SOLUTION INTRAMUSCULAR; INTRAVENOUS at 13:09

## 2021-08-07 RX ADMIN — MYCOPHENOLATE MOFETIL 250 MILLIGRAM(S): 250 CAPSULE ORAL at 06:21

## 2021-08-07 RX ADMIN — Medication 5 MILLIGRAM(S): at 06:20

## 2021-08-07 NOTE — PROGRESS NOTE ADULT - NSICDXPILOT_GEN_ALL_CORE
[place]

## 2021-08-07 NOTE — PROGRESS NOTE ADULT - ATTENDING COMMENTS
I have explained to the patient that the underlying etiology of his urinary tract infection is likely his bladder outlet obstruction from BPH.  He is currently undergoing treatment for prostate cancer which does complicate the management to some degree and this is being done with radiation therapy in combination with androgen deprivation.  Once this infection being treated now is cleared, I would recommend stent exchange.  We may also consider a transurethral resection of the prostate to help alleviate bladder outlet obstruction.  I will speak with him about these procedures when he returns for outpatient follow-up after this hospitalization.
Pt again has a armenta catheter due to failure of TOV  Passing clear urine  Creatinine improving.  Tacro trough stable
CHELA improving, being treated for transplant pyelonephritis.   Tacro trough at goal.   Plan for ureteral stent exchange by Dr. Phillip when infection cleared.
Pt feel ok but with rectal fissure pain.  SPoke to pts sister who would like him to see colorectal, nutrition and be discharged with a new PMD.  CHELA resolving  Still requiring armenta for retention
Patient seen and examined.  Case d/w house staff.  Renal transplant pt with urinary retention, CHELA, and UTI.  Trial of void, f/u urine cx and adjust abx based on sensitivities.
Patient seen and examined.  Case d/w house staff.  Renal transplant pt with urinary retention, CHELA, and UTI.  Still with evidence of retention, Velez catheter replaced yesterday.  CHELA continues to improve  Continue IV Cefepime through Sat
Patient seen and examined.  Case d/w house staff.  Renal transplant pt with urinary retention, CHELA, and UTI.  Still with evidence of retention, will replace armenta.  Continue IV Cefepime through Sat
d/c today after abx infusion.  total d/c time 45 minutes
Patient seen and examined.  Case d/w house staff.  Renal transplant pt with urinary retention, CHELA, and UTI.  Passed trial of void, f/u urine cx and adjust abx based on sensitivities.

## 2021-08-07 NOTE — PROGRESS NOTE ADULT - PROBLEM SELECTOR PLAN 5
Unclear if this is fulminant diarrhea or constipation with overflow. Patients symptoms and presentation are atypical.  -Hold off on adding any stool softners/laxatives/anti-diarrheals  - GI PCR negative. 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    - Patient reporting insufficient stooling.  - Started metoclopramide patient reported improvement  - He was also started on topical Nitro for pain upon defecation 2/2 anal fisure

## 2021-08-07 NOTE — PROGRESS NOTE ADULT - ASSESSMENT
Mr. 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
Mr. 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
Mr. 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 Currently finishing 7 day course of IV Cefepime and continuing assessment for retention. 
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     1.  Renal transplant complicated by CHELA - CHELA likely combination of urinary retention and UTI. Last Scr prior to admission was 4.13 on 7/27/21. Came in with Scr of 5.34 on 8/1/21. Last Scr improved to 2.98 mg/dl today. Kidney u/s done 8/1/21 with stable mild hydronephrosis, stable urothelial thickening with persistent elevated resistive indices, again raising concern for chronic rejection versus infection. Continue armenta catheter. Continue cefepime for pseudomonas UTI. We spoke to primary urologisit regarding JJ stent exchange, will wait until once UTI improved. 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      2.  Immunosuppression - continue home dose of Envarsus 7mgs daily, MMF 250mgs BID and prednisone 5 mg PO daily  Check tacrolimus trough daily (30 minutes prior to morning dose), goal tacrolimus level is between 3 - 5    3.  UTI - as above, continue cefepime.    4.  Diarrhea -  CMV , start on valcyte 450 mg PO daily. GI PCR negative    5.  HTN - BP at target range. Monitor BP on current BP medication. Low salt diet.    6.  DM2 - insulin sliding scale.  Was on Januvia and Glimepiride at home    If any questions, please feel free to contact me     Nils Snow  Nephrology Fellow  [facility] Pager: [phone]  [facility] Pager: [phone]  
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     1.  Renal transplant complicated by CHELA - CHELA likely combination of urinary retention and UTI. Last Scr prior to admission was 4.13 on 7/27/21. Came in with Scr of 5.34 on 8/1/21. Last Scr improved to 4.63 mg/dl today. Kidney u/s done 8/1/21 with stable mild hydronephrosis, stable urothelial thickening with persistent elevated resistive indices, again raising concern for chronic rejection versus infection. Continue armenta catheter. Continue cefepime for pseudomonas UTI. Will discuss with patient's primary urologisit regarding JJ stent exchange once UTI improved. Can d/c fluids today. 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      2.  Immunosuppression - continue home dose of Envarsus 7mgs daily, MMF 250mgs BID and prednisone 5 mg PO daily  Check tacrolimus trough daily (30 minutes prior to morning dose), goal tacrolimus level is between 3 - 5    3.  UTI - as above, continue cefepime.    4.  Diarrhea - check GI pcr, CMV pcr and c diff if persists.  Has some degree of chronic diarrhea.     5.  HTN - BP at target range. Monitor BP on current BP medication. Low salt diet.    6.  DM2 - insulin sliding scale.  Was on Januvia and Glimepiride at home    If any questions, please feel free to contact me     Nils Snow  Nephrology Fellow  [facility] Pager: [phone]  [facility] Pager: [phone]  
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     1.  Renal transplant complicated by CHELA - CHELA likely combination of urinary retention and UTI. Last Scr prior to admission was 4.13 on 7/27/21. Came in with Scr of 5.34 on 8/1/21. Last Scr improved to 3.77 mg/dl today. Kidney u/s done 8/1/21 with stable mild hydronephrosis, stable urothelial thickening with persistent elevated resistive indices, again raising concern for chronic rejection versus infection. Continue armenta catheter. Continue cefepime for pseudomonas UTI. We spoke to primary urologisit regarding JJ stent exchange, will wait until once UTI improved. 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      2.  Immunosuppression - continue home dose of Envarsus 7mgs daily, MMF 250mgs BID and prednisone 5 mg PO daily  Check tacrolimus trough daily (30 minutes prior to morning dose), goal tacrolimus level is between 3 - 5    3.  UTI - as above, continue cefepime.    4.  Diarrhea - check GI pcr, CMV pcr and c diff if persists.  Has some degree of chronic diarrhea.     5.  HTN - BP at target range. Monitor BP on current BP medication. Low salt diet.    6.  DM2 - insulin sliding scale.  Was on Januvia and Glimepiride at home    If any questions, please feel free to contact me     Nils Snow  Nephrology Fellow  [facility] Pager: [phone]  [facility] Pager: [phone]  
69yo M with PMH RCC s/p bilateral nephrectomies in 2015, renal transplant July 2018 c/b stricture s/p stent by IR in April 2021, and recent diagnosis of prostate Ca ([place] 4+3=7) currently undergoing Tx with Orgovyx and SBRT presented to ED with dysuria and difficulty urinating, found to have CHELA and urinary retention.  CHELA most likely due to urinary retention caused by acute UTI.      --no acute  intervention warranted at this time while patient has acute urinary tract infection, after treatment with appropriate antibiotics, patient should have outpatient follow up to discuss bladder outlet surgery to help reduce the risk of additional UTI and also for stent exchange with dr. Phillip  --continue with armenta catheter --> may give trial of void after culture results finalized if patient clinically improved, ambulating, mentating, and stooling at baseline; if fails TOV, replace armenta and plan for TOV as outpatient  --please give preparation H or alternative medication for hemorrhoids  --bowel regimen for constipation  --continue flomax 0.4mg qHS   --trend Cr  --monitor urine output, document in flowsheets q4h  --continue with cefepime (outpt pseudomonas is sensitive), FU urine culture and narrow per sensitivities  --patient seen and evaluated with attending, Dr. Phillip  --no further urologic investigations of interventions warranted at this time, please do not hesitate to contact urology should any additional urologic concerns or questions arise    [facility]  [address] [place] [id number] [phone]
Mr. 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 Currently finishing 7 day course of IV Cefepime and continuing assessment for retention. 
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     1.  Renal transplant complicated by CHELA - CHELA likely combination of urinary retention and UTI. Last Scr prior to admission was 4.13 on 7/27/21. Came in with Scr of 5.34 on 8/1/21. Last Scr improved to 2.84 mg/dl today. Kidney u/s done 8/1/21 with stable mild hydronephrosis, stable urothelial thickening with persistent elevated resistive indices, again raising concern for chronic rejection versus infection. Continue armenta catheter. Continue cefepime for pseudomonas UTI. We spoke to primary urologisit regarding JJ stent exchange, will wait until once UTI improved. 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      2.  Immunosuppression - continue home dose of Envarsus 7mgs daily, MMF 250mgs BID and prednisone 5 mg PO daily  Check tacrolimus trough daily (30 minutes prior to morning dose), goal tacrolimus level is between 3 - 5    3.  UTI - as above, continue cefepime.    4.  Diarrhea -  CMV , continue valcyte 450 mg PO daily. GI PCR negative    5.  HTN - BP at target range. Monitor BP on current BP medication. Low salt diet.    6.  DM2 - insulin sliding scale.  Was on Januvia and Glimepiride at home    If any questions, please feel free to contact me     Nils Snow  Nephrology Fellow  [facility] Pager: [phone]  [facility] Pager: [phone]  
Mr. 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
Mr. 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 Currently finishing 7 day course of IV Cefepime and continuing assessment for retention.

## 2021-08-07 NOTE — DIETITIAN INITIAL EVALUATION ADULT. - PROBLEM SELECTOR PLAN 3
1. Resume home dose of envarsus 7 mg PO daily.  2. Resume home dose of  mg PO BID.  3. Resume home dose of prednisone 5 mg PO daily.  4. Will discuss with renal team the utility of resuming PCP PPx (likely atovaquone), which patient states he stopped taking at home.  5. 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 Continue allopurinol.

## 2021-08-07 NOTE — PROGRESS NOTE ADULT - SUBJECTIVE AND OBJECTIVE BOX
PROGRESS NOTE:   Authored by Dr. Ketan Kraft pager [phone]: [phone]    Patient is a 70y old  Male who presents with a chief complaint of urinary retention (07 Aug 2021 12:31)      SUBJECTIVE / OVERNIGHT EVENTS:    ADDITIONAL REVIEW OF SYSTEMS:    MEDICATIONS  (STANDING):  allopurinol 100 milliGRAM(s) Oral daily  cefepime   IVPB 1000 milliGRAM(s) IV Intermittent every 24 hours  cefepime   IVPB      citric acid/sodium citrate Solution 15 milliLiter(s) Oral three times a day  dextrose 40% Gel 15 Gram(s) Oral once  dextrose 5%. 1000 milliLiter(s) (50 mL/Hr) IV Continuous <Continuous>  dextrose 5%. 1000 milliLiter(s) (100 mL/Hr) IV Continuous <Continuous>  dextrose 50% Injectable 25 Gram(s) IV Push once  dextrose 50% Injectable 12.5 Gram(s) IV Push once  dextrose 50% Injectable 25 Gram(s) IV Push once  finasteride 5 milliGRAM(s) Oral daily  glucagon  Injectable 1 milliGRAM(s) IntraMuscular once  hemorrhoidal Ointment 1 Application(s) Rectal every 8 hours  heparin   Injectable 5000 Unit(s) SubCutaneous every 8 hours  insulin lispro (ADMELOG) corrective regimen sliding scale   SubCutaneous three times a day before meals  insulin lispro (ADMELOG) corrective regimen sliding scale   SubCutaneous at bedtime  metoclopramide 5 milliGRAM(s) Oral four times a day  metoprolol succinate  milliGRAM(s) Oral daily  mycophenolate mofetil 250 milliGRAM(s) Oral two times a day  NIFEdipine XL 60 milliGRAM(s) Oral daily  nitroglycerin  0.2% Ointment Compound 1 Application(s) Rectal two times a day  predniSONE   Tablet 5 milliGRAM(s) Oral daily  tacrolimus ER Tablet (ENVARSUS XR) 7 milliGRAM(s) Oral daily  tamsulosin 0.4 milliGRAM(s) Oral at bedtime  valGANciclovir 450 milliGRAM(s) Oral daily    MEDICATIONS  (PRN):  acetaminophen   Tablet .. 650 milliGRAM(s) Oral every 6 hours PRN Temp greater or equal to 38.5C (101.3F), Mild Pain (1 - 3)      CAPILLARY BLOOD GLUCOSE      POCT Blood Glucose.: 230 mg/dL (07 Aug 2021 12:21)  POCT Blood Glucose.: 160 mg/dL (07 Aug 2021 08:41)  POCT Blood Glucose.: 143 mg/dL (06 Aug 2021 21:16)  POCT Blood Glucose.: 128 mg/dL (06 Aug 2021 17:54)    I&O's Summary    06 Aug 2021 07:01  -  07 Aug 2021 07:00  --------------------------------------------------------  IN: 0 mL / OUT: 1900 mL / NET: -1900 mL        PHYSICAL EXAM:  Vital Signs Last 24 Hrs  T(C): 36.8 (07 Aug 2021 12:34), Max: 36.9 (07 Aug 2021 08:00)  T(F): 98.3 (07 Aug 2021 12:34), Max: 98.5 (07 Aug 2021 08:00)  HR: 75 (07 Aug 2021 12:34) (75 - 76)  BP: 155/73 (07 Aug 2021 12:34) (120/67 - 156/78)  BP(mean): --  RR: 18 (07 Aug 2021 12:34) (16 - 18)  SpO2: 99% (07 Aug 2021 12:34) (98% - 99%)  GENERAL: NAD, lying comfortably in bed  HEAD: Atraumatic, normocephalic  EYES: EOMI b/l PERRLA b/l, conjunctiva and sclera clear  NECK: Supple, No JVD, No LAD  RESPIRATORY: Normal respiratory effort; lungs are clear to auscultation bilaterally  CARDIOVASCULAR: Regular rate and rhythm, normal S1 and S2, no murmur/rub/gallop; No lower extremity edema; Peripheral pulses are 2+ bilaterally  ABDOMEN: Nontender to palpation, normoactive bowel sounds, no rebound/guarding; No hepatosplenomegaly  MUSCLOSKELETAL: no clubbing or cyanosis of digits; no joint swelling or tenderness to palpation  NEURO: Non focal   PSYCH: A+O to person, place, and time; affect appropriate    LABS:                        9.0    5.92  )-----------( 189      ( 07 Aug 2021 07:23 )             30.0     08-07    137  |  108  |  37<H>  ----------------------------<  109<H>  4.9   |  19<L>  |  2.59<H>    Ca    10.2      07 Aug 2021 07:18  Phos  3.4     08-07  Mg     1.6     08-07

## 2021-08-07 NOTE — PROGRESS NOTE ADULT - REASON FOR ADMISSION
Urinary Retention

## 2021-08-07 NOTE — DISCHARGE NOTE NURSING/CASE MANAGEMENT/SOCIAL WORK - NSDCVIVACCINE_GEN_ALL_CORE_FT
influenza, injectable, quadrivalent, preservative free; 15-Sep-2016 13:35; Zulay Diego (RN); [employer]; [id number]; IntraMuscular; Deltoid Right.; 0.5 milliLiter(s); VIS (VIS Published: 07-Aug-2015, VIS Presented: 15-Sep-2016);

## 2021-08-07 NOTE — DISCHARGE NOTE PROVIDER - PROVIDER TOKENS
PROVIDER:[TOKEN:[[id number]]] PROVIDER:[TOKEN:[[id number]],FOLLOWUP:[1 week]],PROVIDER:[TOKEN:[[id number]],FOLLOWUP:[1 week]] PROVIDER:[TOKEN:[[id number]],FOLLOWUP:[1 week]],PROVIDER:[TOKEN:[[id number]],FOLLOWUP:[1 week]],PROVIDER:[TOKEN:[[id number]]]

## 2021-08-07 NOTE — DISCHARGE NOTE PROVIDER - HOSPITAL COURSE
HPI:  Mr. 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  The patient states that underwent a radiation therapy treatment for his prostate on 7/30/21. This was not his first session. Since that time, he has noted progressive decrease in the amount of urine that he is producing, amounting now to just "dribbling". In addition, he began experiencing pain on urination and increasing abdominal "pressure", located primarily in his lower abdomen. Denies gross hematuria. Also endorses both constipation and diarrhea. States that he had a BM earlier this morning that was "pellets", but yesterday was passing pure water. Denies nausea, vomiting, fever, chills, flank pain. Was admitted at [facility] in June 2021 for CHELA and UTI at that time as well, treated with cefepime.  (01 Aug 2021 13:15)    HOSPITAL COURSE  During admission the patient was found to have an UTI caused by MDR Pseudomonas, sensitive to Cefepime. He continued IV therapy to complete a 7 day course. He remained afebrile, w/o leukocytosis and stable vital signs. Additionally, he was admitted for urinary retention and a TOV was performed. He was found to have continuing urinary retention due to which an indwelling Velez was reordered. He continued to have adequate urine output with the Velez. On admission he was found to have acute on chronic kidney injury likely 2/2 urinary retention. After adequate output the CHELA improved and SCr continued to decrease. Transpolant nephrologist and Urology were consulted which agreed that there was no indication for inpatient intervention. Furthermore, the patient reported chronic diarrhea s/p ileoctomy for which GI PCR was sent which was negative. The patient was started on Metoclopramide for complaints of feeling like he was having inadequate stooling , Miralax was held iso of watery diarrhea. Patient reported improved stooling frequency and consistency. He was also started on Nitroglycerin topical for pain upon defecation 2/20 anal fissure. The patient has remained hemodynamically stable with improvement of the symptoms that brought him into the hospital. He has completed 7 days of Abx and is ready to be discharged home with close follow up with Urology and Transplant nephrologist. HPI:  Mr. 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  The patient states that underwent a radiation therapy treatment for his prostate on 7/30/21. This was not his first session. Since that time, he has noted progressive decrease in the amount of urine that he is producing, amounting now to just "dribbling". In addition, he began experiencing pain on urination and increasing abdominal "pressure", located primarily in his lower abdomen. Denies gross hematuria. Also endorses both constipation and diarrhea. States that he had a BM earlier this morning that was "pellets", but yesterday was passing pure water. Denies nausea, vomiting, fever, chills, flank pain. Was admitted at [facility] in June 2021 for CHELA and UTI at that time as well, treated with cefepime.  (01 Aug 2021 13:15)    HOSPITAL COURSE  During admission the patient was found to have an UTI caused by MDR Pseudomonas, sensitive to Cefepime. He continued IV therapy to complete a 7 day course. He remained afebrile, w/o leukocytosis and stable vital signs. Additionally, he was admitted for urinary retention and a TOV was performed. He was found to have continuing urinary retention due to which an indwelling Velez was reordered. He continued to have adequate urine output with the Velez. On admission he was found to have acute on chronic kidney injury likely 2/2 urinary retention. After adequate output the CHELA improved and SCr continued to decrease. Transplant nephrologist and Urology were consulted which agreed that there was no indication for inpatient intervention. Furthermore, the patient reported chronic diarrhea s/p ileoctomy for which GI PCR was sent which was negative. The patient was started on Metoclopramide for complaints of feeling like he was having inadequate stooling , Miralax was held iso of watery diarrhea. Patient reported improved stooling frequency and consistency. He was also started on Nitroglycerin topical for pain upon defecation 2/20 anal fissure. The patient has remained hemodynamically stable with improvement of the symptoms that brought him into the hospital. He has completed 7 days of Abx and is ready to be discharged home with close follow up with Urology and Transplant nephrologist.

## 2021-08-07 NOTE — DIETITIAN INITIAL EVALUATION ADULT. - ORAL INTAKE PTA/DIET HISTORY
Pt reports "fine" appetite and PO intake PTA. Reports adhering to Consistent Carbohydrate diet at home, limits intake of concentrated sweets. Pt reports taking vitamin supplements PTA (B12, B6, B3, folic acid).    Pt reports checking glucose fingersticks PTA, with normal values ranging 125-145 mg/dL. Denies insulin use PTA. Per H&P, DM managed with Januvia PTA. A1c 6.6% suggests good glycemic control.

## 2021-08-07 NOTE — PROGRESS NOTE ADULT - SUBJECTIVE AND OBJECTIVE BOX
--------------------------------------------------------------------------------  Chief Complaint:  No new symptoms     24 hour events/subjective:  Reviewed      PAST HISTORY  --------------------------------------------------------------------------------  No significant changes to PMH, PSH, FHx, SHx, unless otherwise noted    ALLERGIES & MEDICATIONS  --------------------------------------------------------------------------------  Allergies    No Known Allergies    Intolerances      Standing Inpatient Medications  allopurinol 100 milliGRAM(s) Oral daily  cefepime   IVPB 1000 milliGRAM(s) IV Intermittent every 24 hours  cefepime   IVPB      citric acid/sodium citrate Solution 15 milliLiter(s) Oral three times a day  dextrose 40% Gel 15 Gram(s) Oral once  dextrose 5%. 1000 milliLiter(s) IV Continuous <Continuous>  dextrose 5%. 1000 milliLiter(s) IV Continuous <Continuous>  dextrose 50% Injectable 25 Gram(s) IV Push once  dextrose 50% Injectable 12.5 Gram(s) IV Push once  dextrose 50% Injectable 25 Gram(s) IV Push once  finasteride 5 milliGRAM(s) Oral daily  glucagon  Injectable 1 milliGRAM(s) IntraMuscular once  hemorrhoidal Ointment 1 Application(s) Rectal every 8 hours  heparin   Injectable 5000 Unit(s) SubCutaneous every 8 hours  insulin lispro (ADMELOG) corrective regimen sliding scale   SubCutaneous three times a day before meals  insulin lispro (ADMELOG) corrective regimen sliding scale   SubCutaneous at bedtime  metoclopramide 5 milliGRAM(s) Oral four times a day  metoprolol succinate  milliGRAM(s) Oral daily  mycophenolate mofetil 250 milliGRAM(s) Oral two times a day  NIFEdipine XL 60 milliGRAM(s) Oral daily  nitroglycerin  0.2% Ointment Compound 1 Application(s) Rectal two times a day  predniSONE   Tablet 5 milliGRAM(s) Oral daily  tacrolimus ER Tablet (ENVARSUS XR) 7 milliGRAM(s) Oral daily  tamsulosin 0.4 milliGRAM(s) Oral at bedtime  valGANciclovir 450 milliGRAM(s) Oral daily    PRN Inpatient Medications  acetaminophen   Tablet .. 650 milliGRAM(s) Oral every 6 hours PRN      REVIEW OF SYSTEMS  --------------------------------------------------------------------------------  Gen: No fevers/chills, weakness  Skin: No rashes  Head/Eyes/Ears/Mouth: No headache; No sore throat  Respiratory: No dyspnea, cough,   CV: No chest pain, PND, orthopnea  GI: No abdominal pain, diarrhea, constipation, nausea, vomiting  Transplant: No pain  : Foleys  MSK: No joint pain/swelling; no back pain; no edema  Neuro: No dizziness/lightheadedness, weakness, seizures, numbness, tingling  Psych: No significant nervousness, anxiety, stress, depression    All other systems were reviewed and are negative, except as noted.    VITALS/PHYSICAL EXAM  --------------------------------------------------------------------------------  T(C): 36.9 (08-07-21 @ 16:48), Max: 36.9 (08-07-21 @ 08:00)  HR: 76 (08-07-21 @ 16:48) (75 - 76)  BP: 149/66 (08-07-21 @ 16:48) (120/67 - 156/78)  RR: 16 (08-07-21 @ 16:48) (16 - 18)  SpO2: 97% (08-07-21 @ 16:48) (97% - 99%)        08-06-21 @ 07:01  -  08-07-21 @ 07:00  --------------------------------------------------------  IN: 0 mL / OUT: 1900 mL / NET: -1900 mL    08-07-21 @ 07:01  -  08-07-21 @ 16:59  --------------------------------------------------------  IN: 600 mL / OUT: 800 mL / NET: -200 mL      Physical Exam:  	Gen: NAD, well-appearing  	HEENT: PERRL, supple neck, clear oropharynx  	Pulm: CTA B/L  	CV: RRR, S1S2; no rub  	Abd: +BS, soft, nontender/nondistended                      Transplant: No tenderness, swelling  	: No suprapubic tenderness. Foleys catheter +  	UE: UE AVF + bruit noted. No asterixis  	LE: Warm, FROM, intact strength; no edema  	Neuro: No focal deficits   	Psych: Normal affect and mood  	Skin: Warm, without rashes      LABS/STUDIES  --------------------------------------------------------------------------------              9.0    5.92  >-----------<  189      [08-07-21 @ 07:23]              30.0     137  |  108  |  37  ----------------------------<  109      [08-07-21 @ 07:18]  4.9   |  19  |  2.59        Ca     10.2     [08-07-21 @ 07:18]      Mg     1.6     [08-07-21 @ 07:18]      Phos  3.4     [08-07-21 @ 07:18]            Creatinine Trend:  SCr 2.59 [08-07 @ 07:18]  SCr 2.84 [08-06 @ 05:30]  SCr 2.98 [08-05 @ 05:45]  SCr 3.23 [08-04 @ 16:14]  SCr 3.35 [08-04 @ 08:34]    Tacrolimus (), Serum: 4.4 ng/mL (08-07 @ 09:19)  Tacrolimus (), Serum: 5.2 ng/mL (08-06 @ 06:02)  Tacrolimus (), Serum: 4.2 ng/mL (08-05 @ 07:25)  Tacrolimus (), Serum: 7.2 ng/mL (08-04 @ 07:21)      Urinalysis - [08-01-21 @ 06:31]      Color Yellow / Appearance Slightly Turbid / SG 1.015 / pH 6.5      Gluc Negative / Ketone Negative  / Bili Negative / Urobili Negative       Blood Small / Protein 300 mg/dL / Leuk Est Large / Nitrite Positive      RBC 8 /  / Hyaline 0 / Gran  / Sq Epi  / Non Sq Epi 1 / Bacteria Negative      Iron 75, TIBC 278, %sat 27      [06-02-21 @ 16:51]  Ferritin 450      [06-02-21 @ 17:06]  PTH -- (Ca 10.3)      [06-04-21 @ 09:37]   22  PTH -- (Ca --)      [06-02-21 @ 17:06]   15  Vitamin D (25OH) 37.0      [06-04-21 @ 09:37]  HbA1c 6.0      [08-09-19 @ 23:48]      Culture - Urine (08.01.21 @ 09:37)    -  Amikacin: S <=16    -  Amikacin: S <=16    -  Aztreonam: I 16    -  Aztreonam: S 8    -  Cefepime: S 4    -  Cefepime: S 4    -  Ceftazidime: S 4    -  Ceftazidime: S 4    -  Ciprofloxacin: R >2    -  Ciprofloxacin: R >2    -  Gentamicin: S <=2    -  Gentamicin: S <=2    -  Imipenem: I 4    -  Imipenem: S 2    -  Levofloxacin: R >4    -  Levofloxacin: R >4    -  Meropenem: S <=1    -  Meropenem: S 2    -  Piperacillin/Tazobactam: S <=8    -  Piperacillin/Tazobactam: S <=8    -  Tobramycin: S <=2    -  Tobramycin: S <=2    Specimen Source: Clean Catch Clean Catch (Midstream)    Culture Results:   >100,000 CFU/ml Pseudomonas aeruginosa  Multiple Morphological Strains    Organism Identification: Pseudomonas aeruginosa  Pseudomonas aeruginosa    Organism: Pseudomonas aeruginosa    Organism: Pseudomonas aeruginosa    Method Type: AUTUMN    Method Type: AUTUMN    Culture - Blood (07.09.20 @ 09:01)    Specimen Source: .Blood Blood-Peripheral    Culture Results:   No Growth Final    < from: US Trans Kidney w/ Doppler, Right (08.01.21 @ 09:16) >  Transplant Renal Artery:  Peak systolic velocity is 126 cm/sec anastomosis, 146 cm/sec proximal, 152 cm/sec mid, 108 cm/sec distal and 99 cm/sec hilum.  Resistive Indices Range: 0.78-0.85    Transplant Renal Vein: Patent.    IMPRESSION:    Stable mild hydronephrosis.    Stable urothelial thickening with persistent elevated resistive indices, again raising concern for chronic rejection versus infection.    A subcentimeter focus of debris in the renal pelvis is new and may represent blood clot versus sloughed papilla.    < end of copied text >

## 2021-08-07 NOTE — DISCHARGE NOTE PROVIDER - CARE PROVIDERS DIRECT ADDRESSES
[email or web address]

## 2021-08-07 NOTE — DIETITIAN INITIAL EVALUATION ADULT. - OTHER INFO
Pt reports good appetite and PO intake in-house. Denies nausea/vomiting. Endorses diarrhea and constipation. Last BMs (x2) this morning. Denies difficulties chewing or swallowing. Confirmed NKFA.     Pt reports UBW ~210 pounds, reports standing weight from 8/4 207 pounds appears accurate. Will continue to monitor and trend weights.    Encouraged continued good PO intake as tolerated. Reviewed Consistent Carbohydrate/DM nutrition therapy and encouraged continued avoidance of concentrated sweets. Discussed nutrition therapy for CHELA and avoidance of foods high in potassium, phosphorus - pt endorses awareness of these foods. Educated pt on foods to help with bowel regularity - encouraged adequate fiber and fluid intake. Pt with limited interest in diet education/speaking with RD at this time. Made aware RD remains available.

## 2021-08-07 NOTE — PROGRESS NOTE ADULT - PROBLEM SELECTOR PLAN 2
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 Has chronic ureteral stent as well which may be a nidus for infection.  -Started cefepime 1g IV q24h (renal dosing). May need to adjust as creatinine improves.  -c/w CEFEPIME day 7/7 (started 08/1)  -UCx: Pseudomonas, resistant to PO meds. c/w IV ABx  -Case discussed with urology PA - recommending IR evaluation if decision is made to remove or replace stent, as they placed stent initially

## 2021-08-07 NOTE — DIETITIAN INITIAL EVALUATION ADULT. - REASON INDICATOR FOR ASSESSMENT
Consults received for assessment, education. Source: EMR, pt, medical team. 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 Admitted 8/1.

## 2021-08-07 NOTE — DISCHARGE NOTE PROVIDER - NSDCCPCAREPLAN_GEN_ALL_CORE_FT
PRINCIPAL DISCHARGE DIAGNOSIS  Diagnosis: CHELA (acute kidney injury)  Assessment and Plan of Treatment:       SECONDARY DISCHARGE DIAGNOSES  Diagnosis: Urinary tract infection without hematuria, site unspecified  Assessment and Plan of Treatment: Yo were admitted with decreased urine output and was found to have a urinary tract infection. During admission you were treated with Cefepime for 7 days for multi-drug resistant psedomonas infection. Transplant Nephrology and urology evaluated you and decided that there was no need for inpatient intervention. The armenta was removed for a trial of void but you continued to retain urine due to which itwas replaced. You will need to follow up with your nephrologist and urologist in their clinics. You will be discharged home with a Armenta in place and with prescriptions for CMV prophylaxis and BPH.    Diagnosis: Urinary retention  Assessment and Plan of Treatment:      PRINCIPAL DISCHARGE DIAGNOSIS  Diagnosis: Urinary tract infection without hematuria, site unspecified  Assessment and Plan of Treatment: You were admitted with decreased urine output and was found to have a urinary tract infection. During admission you were treated with Cefepime for 7 days for multi-drug resistant psedomonas infection. Transplant Nephrology and urology evaluated you and decided that there was no need for inpatient intervention. The armenta was removed for a trial of void but you continued to retain urine due to which itwas replaced. You will need to follow up with your nephrologist and urologist in their clinics. You will be discharged home with a Armenta in place and with prescriptions for CMV prophylaxis and BPH.Please follow up with Dr. Arian Bhakta [phone] or [phone]. Please follow up with your urologist within 1 week Dr. Phillip [phone] [place], [place].      SECONDARY DISCHARGE DIAGNOSES  Diagnosis: Urinary retention  Assessment and Plan of Treatment:     Diagnosis: Urinary tract infection without hematuria, site unspecified  Assessment and Plan of Treatment: Yo were admitted with decreased urine output and was found to have a urinary tract infection. During admission you were treated with Cefepime for 7 days for multi-drug resistant psedomonas infection. Transplant Nephrology and urology evaluated you and decided that there was no need for inpatient intervention. The armenta was removed for a trial of void but you continued to retain urine due to which itwas replaced. You will need to follow up with your nephrologist and urologist in their clinics. You will be discharged home with a Armenta in place and with prescriptions for CMV prophylaxis and BPH.

## 2021-08-07 NOTE — DIETITIAN INITIAL EVALUATION ADULT. - PERTINENT LABORATORY DATA
BUN 37, Cr 2.59, glucose 109  HbA1c (8/3): 6.6%    CAPILLARY BLOOD GLUCOSE  POCT Blood Glucose.: 230 mg/dL (07 Aug 2021 12:21)  POCT Blood Glucose.: 160 mg/dL (07 Aug 2021 08:41)  POCT Blood Glucose.: 143 mg/dL (06 Aug 2021 21:16)  POCT Blood Glucose.: 128 mg/dL (06 Aug 2021 17:54)

## 2021-08-07 NOTE — PROGRESS NOTE ADULT - PROBLEM SELECTOR PROBLEM 7
Type 2 diabetes mellitus
BPH (benign prostatic hyperplasia)
Type 2 diabetes mellitus
Type 2 diabetes mellitus
BPH (benign prostatic hyperplasia)
BPH (benign prostatic hyperplasia)

## 2021-08-07 NOTE — DISCHARGE NOTE NURSING/CASE MANAGEMENT/SOCIAL WORK - PATIENT PORTAL LINK FT
You can access the FollowMyHealth Patient Portal offered by [facility] by registering at the following website: [email or web address]. By joining [facility]’s FollowMyHealth portal, you will also be able to view your health information using other applications (apps) compatible with our system.

## 2021-08-07 NOTE — PROGRESS NOTE ADULT - PROBLEM SELECTOR PLAN 8
-Continue flomax  - Started Finasteride  -Urology f/u    DVT PPx; HSQ TID given renal insufficiency.

## 2021-08-07 NOTE — PROGRESS NOTE ADULT - PROBLEM SELECTOR PROBLEM 3
Transplant recipient
Urinary tract infection without hematuria, site unspecified
Transplant recipient
Transplant recipient
Urinary retention

## 2021-08-07 NOTE — PROGRESS NOTE ADULT - PROBLEM SELECTOR PROBLEM 6
Type 2 diabetes mellitus
Essential hypertension

## 2021-08-07 NOTE — DIETITIAN INITIAL EVALUATION ADULT. - SIGNS/SYMPTOMS
as evidenced by pt with prostate ca on active radiation therapy as evidenced by pt with GI distress requesting diet education in setting of limited prior knowledge

## 2021-08-07 NOTE — PROGRESS NOTE ADULT - PROBLEM SELECTOR PLAN 3
Patient presented with urinary retention and UTI. UTI being appropriately treated but retention continues.  - Velez replaced after failing TOV  - Finasteride and Tamsulosin  - Patient will follow up with URO as outpt

## 2021-08-07 NOTE — DIETITIAN INITIAL EVALUATION ADULT. - ETIOLOGY
related to increased physiological demand related to nutrition recommendations for optimal bowel health

## 2021-08-07 NOTE — PROGRESS NOTE ADULT - PROBLEM SELECTOR PROBLEM 1
Renal transplant recipient
Acute kidney injury

## 2021-08-07 NOTE — PROGRESS NOTE ADULT - PROVIDER SPECIALTY LIST ADULT
Transplant Nephrology
Urology
Transplant Nephrology
Internal Medicine
Transplant Nephrology
Hospitalist
Internal Medicine

## 2021-08-07 NOTE — PROGRESS NOTE ADULT - TIME BILLING
Kidney Transplant recipient with functioning allograft  Elevated downtrending creatinine  DM, HTN  Urinary tract infection, on antibiotics  Urinary retention, Foleys catheter in place  Ureteral stricture, has stent in place  Creatinine trend noted  Comorbidities reviewed.  Patient seen, examined and reviewed available clinical and lab data including history,  progress notes and consult notes.  Reviewed immunosuppression and allograft function including urine out put, creatinine trend, urine studies and any allograft and bladder imaging.  Reviewed medication regimen for glycemic control and blood pressure control  On reduced MMf dose , has active infection  Suggestions:  1. Continue current immunosuppression, Tac level target 4-6 ng/ml; Reduced MMf dose  2. Urology follow up on discharge  Will follow  I was present during and reviewed clinical and lab data as well as assessment and plan as documented . Please contact if any additional questions with any change in clinical condition or on availability of any additional information or reports.

## 2021-08-07 NOTE — DISCHARGE NOTE PROVIDER - NSDCMRMEDTOKEN_GEN_ALL_CORE_FT
allopurinol 100 mg oral tablet: 1 tab(s) orally once a day  B Complex 50 oral tablet, extended release: 1 tab(s) orally once a day  CellCept 250 mg oral capsule: 1 cap(s) orally 2 times a day  finasteride 5 mg oral tablet: 1 tab(s) orally once a day  Januvia 50 mg oral tablet: 1 tab(s) orally once a day  metoclopramide 5 mg oral tablet: 1 tab(s) orally 4 times a day  metoprolol succinate 100 mg oral tablet, extended release: 1 tab(s) orally once a day  NIFEdipine 30 mg oral tablet, extended release: 2 tab(s) orally once a day  Orgovyx 120 mg oral tablet: 1 tab(s) orally once a day  predniSONE 5 mg oral tablet: 1 tab(s) orally once a day  sodium citrate-citric acid 500 mg-334 mg/5 mL oral solution: 15 milliliter(s) orally 3 times a day  tamsulosin 0.4 mg oral capsule: 1 cap(s) orally once a day  valGANciclovir 450 mg oral tablet: 1 tab(s) orally once a day   allopurinol 100 mg oral tablet: 1 tab(s) orally once a day  B Complex 50 oral tablet, extended release: 1 tab(s) orally once a day  CellCept 250 mg oral capsule: 1 cap(s) orally 2 times a day  finasteride 5 mg oral tablet: 1 tab(s) orally once a day  finasteride 5 mg oral tablet: 1 tab(s) orally once a day  Januvia 50 mg oral tablet: 1 tab(s) orally once a day  metoprolol succinate 100 mg oral tablet, extended release: 1 tab(s) orally once a day  NIFEdipine 30 mg oral tablet, extended release: 2 tab(s) orally once a day  Orgovyx 120 mg oral tablet: 1 tab(s) orally once a day  predniSONE 5 mg oral tablet: 1 tab(s) orally once a day  sodium citrate-citric acid 500 mg-334 mg/5 mL oral solution: 15 milliliter(s) orally 3 times a day  tamsulosin 0.4 mg oral capsule: 1 cap(s) orally once a day  valGANciclovir 450 mg oral tablet: 1 tab(s) orally once a day  valGANciclovir 450 mg oral tablet: 1 tab(s) orally once a day   allopurinol 100 mg oral tablet: 1 tab(s) orally once a day  B Complex 50 oral tablet, extended release: 1 tab(s) orally once a day  CellCept 250 mg oral capsule: 1 cap(s) orally 2 times a day  finasteride 5 mg oral tablet: 1 tab(s) orally once a day  Januvia 50 mg oral tablet: 1 tab(s) orally once a day  metoprolol succinate 100 mg oral tablet, extended release: 1 tab(s) orally once a day  NIFEdipine 30 mg oral tablet, extended release: 2 tab(s) orally once a day  Orgovyx 120 mg oral tablet: 1 tab(s) orally once a day  predniSONE 5 mg oral tablet: 1 tab(s) orally once a day  sodium citrate-citric acid 500 mg-334 mg/5 mL oral solution: 15 milliliter(s) orally 3 times a day  tamsulosin 0.4 mg oral capsule: 1 cap(s) orally once a day  valGANciclovir 450 mg oral tablet: 1 tab(s) orally once a day   allopurinol 100 mg oral tablet: 1 tab(s) orally once a day  B Complex 50 oral tablet, extended release: 1 tab(s) orally once a day  CellCept 250 mg oral capsule: 1 cap(s) orally 2 times a day  Envarsus XR: 7 milligram(s) orally once a day  finasteride 5 mg oral tablet: 1 tab(s) orally once a day  Januvia 50 mg oral tablet: 1 tab(s) orally once a day  metoprolol succinate 100 mg oral tablet, extended release: 1 tab(s) orally once a day  NIFEdipine 30 mg oral tablet, extended release: 2 tab(s) orally once a day  Orgovyx 120 mg oral tablet: 1 tab(s) orally once a day  predniSONE 5 mg oral tablet: 1 tab(s) orally once a day  sodium citrate-citric acid 500 mg-334 mg/5 mL oral solution: 15 milliliter(s) orally 3 times a day  tamsulosin 0.4 mg oral capsule: 1 cap(s) orally once a day  valGANciclovir 450 mg oral tablet: 1 tab(s) orally once a day   allopurinol 100 mg oral tablet: 1 tab(s) orally once a day  B Complex 50 oral tablet, extended release: 1 tab(s) orally once a day  CellCept 250 mg oral capsule: 1 cap(s) orally 2 times a day  Envarsus XR 1 mg oral tablet, extended release: 3 tab(s) orally once a day     take with 4mg tablet for total of 7mg daily envarsus  Envarsus XR 4 mg oral tablet, extended release: 1 tab(s) orally once a day     take with 3 tabs of 1mg envarsus for total of 7mg envarsus daily  finasteride 5 mg oral tablet: 1 tab(s) orally once a day  Januvia 50 mg oral tablet: 1 tab(s) orally once a day  metoprolol succinate 100 mg oral tablet, extended release: 1 tab(s) orally once a day  NIFEdipine 30 mg oral tablet, extended release: 2 tab(s) orally once a day  Orgovyx 120 mg oral tablet: 1 tab(s) orally once a day  predniSONE 5 mg oral tablet: 1 tab(s) orally once a day  sodium citrate-citric acid 500 mg-334 mg/5 mL oral solution: 15 milliliter(s) orally 3 times a day  tamsulosin 0.4 mg oral capsule: 1 cap(s) orally once a day  valGANciclovir 450 mg oral tablet: 1 tab(s) orally once a day

## 2021-08-07 NOTE — PROGRESS NOTE ADULT - PROBLEM SELECTOR PROBLEM 4
Diarrhea
Immunosuppressed status
Diarrhea
Transplant recipient
Diarrhea

## 2021-08-07 NOTE — PROGRESS NOTE ADULT - PROBLEM SELECTOR PLAN 1
with baseline creatinine in the 2-range, suspect CKD stage III as well.  Etiology most likely secondary to urinary retention  - Improving renal function today  - indwelling  Velez in place after failing TOV  - Started finesteride iso of BPH hx, c/w Tamsulosin  -Monitor Is and Os.  -Avoid nephrotoxic medications.

## 2021-08-09 PROCEDURE — 77427 RADIATION TX MANAGEMENT X5: CPT

## 2021-08-09 PROCEDURE — 77387B: CUSTOM | Mod: 26

## 2021-08-10 ENCOUNTER — NON-APPOINTMENT (OUTPATIENT)
Age: 71
End: 2021-08-10

## 2021-08-10 VITALS
WEIGHT: 206.68 LBS | SYSTOLIC BLOOD PRESSURE: 114 MMHG | HEART RATE: 80 BPM | DIASTOLIC BLOOD PRESSURE: 64 MMHG | RESPIRATION RATE: 18 BRPM | TEMPERATURE: 98.5 F | OXYGEN SATURATION: 98 % | BODY MASS INDEX: 28.83 KG/M2

## 2021-08-10 PROCEDURE — 77387B: CUSTOM | Mod: 26

## 2021-08-10 NOTE — REVIEW OF SYSTEMS
[Constipation: Grade 1 - Occasional or intermittent symptoms; occasional use of stool softeners, laxatives, dietary modification, or enema] : Constipation: Grade 1 - Occasional or intermittent symptoms; occasional use of stool softeners, laxatives, dietary modification, or enema [Fatigue: Grade 1 - Fatigue relieved by rest] : Fatigue: Grade 1 - Fatigue relieved by rest [Hematuria: Grade 0] : Hematuria: Grade 0 [Urinary Tract Pain: Grade 0] : Urinary Tract Pain: Grade 0 [Dermatitis Radiation: Grade 0] : Dermatitis Radiation: Grade 0

## 2021-08-11 PROCEDURE — 77387B: CUSTOM | Mod: 26

## 2021-08-12 PROCEDURE — 77014: CPT | Mod: 26

## 2021-08-13 PROCEDURE — 77427 RADIATION TX MANAGEMENT X5: CPT

## 2021-08-13 PROCEDURE — 77387B: CUSTOM | Mod: 26

## 2021-08-16 PROCEDURE — 77387B: CUSTOM | Mod: 26

## 2021-08-17 ENCOUNTER — APPOINTMENT (OUTPATIENT)
Dept: TRANSPLANT | Facility: CLINIC | Age: 71
End: 2021-08-17

## 2021-08-17 PROCEDURE — 77387B: CUSTOM | Mod: 26

## 2021-08-18 ENCOUNTER — NON-APPOINTMENT (OUTPATIENT)
Age: 71
End: 2021-08-18

## 2021-08-18 LAB
ESTIMATED AVERAGE GLUCOSE: 126 MG/DL
HBA1C MFR BLD HPLC: 6 %

## 2021-08-18 PROCEDURE — 77387B: CUSTOM | Mod: 26

## 2021-08-18 NOTE — REVIEW OF SYSTEMS
[Hematuria: Grade 0] : Hematuria: Grade 0 [Urinary Tract Pain: Grade 0] : Urinary Tract Pain: Grade 0 [Dermatitis Radiation: Grade 0] : Dermatitis Radiation: Grade 0 [Constipation: Grade 0] : Constipation: Grade 0 [Diarrhea: Grade 1 - Increase of <4 stools per day over baseline; mild increase in ostomy output compared to baseline] : Diarrhea: Grade 1 - Increase of <4 stools per day over baseline; mild increase in ostomy output compared to baseline [Fatigue: Grade 0] : Fatigue: Grade 0

## 2021-08-18 NOTE — PHYSICAL EXAM
[General Appearance - Alert] : alert [General Appearance - In No Acute Distress] : in no acute distress [Skin Color & Pigmentation] : normal skin color and pigmentation [de-identified] : armenta catheter

## 2021-08-18 NOTE — DISEASE MANAGEMENT
[Clinical] : TNM Stage: c [II] : II [TTNM] : 1c [NTNM] : 0 [MTNM] : 0 [de-identified] : 7000 cGy  [de-identified] : prostate

## 2021-08-18 NOTE — HISTORY OF PRESENT ILLNESS
[FreeTextEntry1] : 71 y/o male with h/o kidney transplant (bilateral nephrectomy) in 2018, HTN, DM2, newly diagnosed prostate cancer presents to discuss radiation treatment for prostate cancer. \par \par Pathology in 3/2021 showed [place] 4+3 in 2 cores with 80% of tissue involvement, [place] 3+4 in 4 cores with 60% involvement, [place] 3+3 in 2 cores with 10% involvement. Total 14 biopsy cores, with 7 cores positive with cancer.  PSA 5.27 in 12/2020, PSA 4.29 in 10/2020, PSA 3.41 in 5/2018\par \par 8/18/21: 18/28 fx small frequent loose bms 4-5 x day. Taking Imodium. Slight blood on toilet paper. Still with indwelling catheter. Seeing uro tomorrow\par \par 8/10/21: 18/28 fx Recent inpt admission at [facility] for urinary retention. Now with indwelling catheter. Denies pain \par \par 7/27/21: 13/28 fx Tolerating RT, urinary frequency increased since starting radiation, up every hour over night. Taking Flomax. No bowel deterioration. Mild fatigue.

## 2021-08-19 ENCOUNTER — APPOINTMENT (OUTPATIENT)
Dept: UROLOGY | Facility: CLINIC | Age: 71
End: 2021-08-19
Payer: MEDICARE

## 2021-08-19 ENCOUNTER — APPOINTMENT (OUTPATIENT)
Dept: UROLOGY | Facility: CLINIC | Age: 71
End: 2021-08-19

## 2021-08-19 ENCOUNTER — NON-APPOINTMENT (OUTPATIENT)
Age: 71
End: 2021-08-19

## 2021-08-19 ENCOUNTER — INPATIENT (INPATIENT)
Facility: HOSPITAL | Age: 71
LOS: 4 days | Discharge: HOME | DRG: 699 | End: 2021-08-24
Attending: HOSPITALIST | Admitting: HOSPITALIST
Payer: MEDICARE

## 2021-08-19 ENCOUNTER — OUTPATIENT (OUTPATIENT)
Dept: OUTPATIENT SERVICES | Facility: HOSPITAL | Age: 71
LOS: 1 days | End: 2021-08-19

## 2021-08-19 VITALS
BODY MASS INDEX: 28.84 KG/M2 | HEIGHT: 71 IN | HEART RATE: 71 BPM | WEIGHT: 206 LBS | RESPIRATION RATE: 17 BRPM | SYSTOLIC BLOOD PRESSURE: 137 MMHG | DIASTOLIC BLOOD PRESSURE: 68 MMHG

## 2021-08-19 VITALS
SYSTOLIC BLOOD PRESSURE: 143 MMHG | TEMPERATURE: 98 F | HEART RATE: 80 BPM | WEIGHT: 210.1 LBS | HEIGHT: 70.5 IN | OXYGEN SATURATION: 98 % | DIASTOLIC BLOOD PRESSURE: 70 MMHG | RESPIRATION RATE: 18 BRPM

## 2021-08-19 DIAGNOSIS — Z90.5 ACQUIRED ABSENCE OF KIDNEY: Chronic | ICD-10-CM

## 2021-08-19 DIAGNOSIS — Z94.0 KIDNEY TRANSPLANT STATUS: Chronic | ICD-10-CM

## 2021-08-19 DIAGNOSIS — I77.0 ARTERIOVENOUS FISTULA, ACQUIRED: Chronic | ICD-10-CM

## 2021-08-19 DIAGNOSIS — Z98.890 OTHER SPECIFIED POSTPROCEDURAL STATES: Chronic | ICD-10-CM

## 2021-08-19 DIAGNOSIS — R35.0 FREQUENCY OF MICTURITION: ICD-10-CM

## 2021-08-19 PROCEDURE — 51702 INSERT TEMP BLADDER CATH: CPT | Mod: PD

## 2021-08-19 PROCEDURE — 99285 EMERGENCY DEPT VISIT HI MDM: CPT

## 2021-08-19 PROCEDURE — 77014: CPT | Mod: 26

## 2021-08-19 PROCEDURE — 99213 OFFICE O/P EST LOW 20 MIN: CPT | Mod: 25

## 2021-08-19 PROCEDURE — 51798 US URINE CAPACITY MEASURE: CPT | Mod: PD

## 2021-08-19 NOTE — ED CLERICAL - NS ED CLERK NOTE PRE-ARRIVAL INFORMATION; ADDITIONAL PRE-ARRIVAL INFORMATION
CC/Reason For referral: kidney TX 2018. possible infection creatine 2.5 to 2.9  Preferred Consultant(if applicable):  Who admits for you (if needed):  Do you have documents you would like to fax over? NO   Would you still like to speak to an ED attending? YES

## 2021-08-20 ENCOUNTER — NON-APPOINTMENT (OUTPATIENT)
Age: 71
End: 2021-08-20

## 2021-08-20 DIAGNOSIS — N17.9 ACUTE KIDNEY FAILURE, UNSPECIFIED: ICD-10-CM

## 2021-08-20 DIAGNOSIS — N40.0 BENIGN PROSTATIC HYPERPLASIA WITHOUT LOWER URINARY TRACT SYMPTOMS: ICD-10-CM

## 2021-08-20 DIAGNOSIS — Z94.0 KIDNEY TRANSPLANT STATUS: ICD-10-CM

## 2021-08-20 DIAGNOSIS — C61 MALIGNANT NEOPLASM OF PROSTATE: ICD-10-CM

## 2021-08-20 DIAGNOSIS — I10 ESSENTIAL (PRIMARY) HYPERTENSION: ICD-10-CM

## 2021-08-20 DIAGNOSIS — N39.0 URINARY TRACT INFECTION, SITE NOT SPECIFIED: ICD-10-CM

## 2021-08-20 DIAGNOSIS — E11.9 TYPE 2 DIABETES MELLITUS WITHOUT COMPLICATIONS: ICD-10-CM

## 2021-08-20 LAB
ALBUMIN SERPL ELPH-MCNC: 4.1 G/DL — SIGNIFICANT CHANGE UP (ref 3.3–5)
ALP SERPL-CCNC: 82 U/L — SIGNIFICANT CHANGE UP (ref 40–120)
ALT FLD-CCNC: 13 U/L — SIGNIFICANT CHANGE UP (ref 10–45)
ANION GAP SERPL CALC-SCNC: 13 MMOL/L — SIGNIFICANT CHANGE UP (ref 5–17)
APPEARANCE UR: ABNORMAL
AST SERPL-CCNC: 17 U/L — SIGNIFICANT CHANGE UP (ref 10–40)
BACTERIA # UR AUTO: NEGATIVE — SIGNIFICANT CHANGE UP
BASOPHILS # BLD AUTO: 0.02 K/UL — SIGNIFICANT CHANGE UP (ref 0–0.2)
BASOPHILS NFR BLD AUTO: 0.3 % — SIGNIFICANT CHANGE UP (ref 0–2)
BILIRUB SERPL-MCNC: 0.2 MG/DL — SIGNIFICANT CHANGE UP (ref 0.2–1.2)
BILIRUB UR-MCNC: NEGATIVE — SIGNIFICANT CHANGE UP
BUN SERPL-MCNC: 34 MG/DL — HIGH (ref 7–23)
CALCIUM SERPL-MCNC: 10.1 MG/DL — SIGNIFICANT CHANGE UP (ref 8.4–10.5)
CHLORIDE SERPL-SCNC: 105 MMOL/L — SIGNIFICANT CHANGE UP (ref 96–108)
CO2 SERPL-SCNC: 22 MMOL/L — SIGNIFICANT CHANGE UP (ref 22–31)
COLOR SPEC: ABNORMAL
COMMENT - URINE: SIGNIFICANT CHANGE UP
CREAT SERPL-MCNC: 3.12 MG/DL — HIGH (ref 0.5–1.3)
DIFF PNL FLD: ABNORMAL
EOSINOPHIL # BLD AUTO: 0.07 K/UL — SIGNIFICANT CHANGE UP (ref 0–0.5)
EOSINOPHIL NFR BLD AUTO: 0.9 % — SIGNIFICANT CHANGE UP (ref 0–6)
EPI CELLS # UR: 0 /HPF — SIGNIFICANT CHANGE UP
GLUCOSE BLDC GLUCOMTR-MCNC: 118 MG/DL — HIGH (ref 70–99)
GLUCOSE BLDC GLUCOMTR-MCNC: 152 MG/DL — HIGH (ref 70–99)
GLUCOSE BLDC GLUCOMTR-MCNC: 185 MG/DL — HIGH (ref 70–99)
GLUCOSE BLDC GLUCOMTR-MCNC: 197 MG/DL — HIGH (ref 70–99)
GLUCOSE SERPL-MCNC: 151 MG/DL — HIGH (ref 70–99)
GLUCOSE UR QL: NEGATIVE — SIGNIFICANT CHANGE UP
HCT VFR BLD CALC: 33.6 % — LOW (ref 39–50)
HGB BLD-MCNC: 10.1 G/DL — LOW (ref 13–17)
HYALINE CASTS # UR AUTO: 1 /LPF — SIGNIFICANT CHANGE UP (ref 0–2)
IMM GRANULOCYTES NFR BLD AUTO: 0.7 % — SIGNIFICANT CHANGE UP (ref 0–1.5)
KETONES UR-MCNC: NEGATIVE — SIGNIFICANT CHANGE UP
LEUKOCYTE ESTERASE UR-ACNC: ABNORMAL
LYMPHOCYTES # BLD AUTO: 1.86 K/UL — SIGNIFICANT CHANGE UP (ref 1–3.3)
LYMPHOCYTES # BLD AUTO: 24.6 % — SIGNIFICANT CHANGE UP (ref 13–44)
MCHC RBC-ENTMCNC: 30.1 GM/DL — LOW (ref 32–36)
MCHC RBC-ENTMCNC: 30.1 PG — SIGNIFICANT CHANGE UP (ref 27–34)
MCV RBC AUTO: 100.3 FL — HIGH (ref 80–100)
MONOCYTES # BLD AUTO: 0.37 K/UL — SIGNIFICANT CHANGE UP (ref 0–0.9)
MONOCYTES NFR BLD AUTO: 4.9 % — SIGNIFICANT CHANGE UP (ref 2–14)
NEUTROPHILS # BLD AUTO: 5.18 K/UL — SIGNIFICANT CHANGE UP (ref 1.8–7.4)
NEUTROPHILS NFR BLD AUTO: 68.6 % — SIGNIFICANT CHANGE UP (ref 43–77)
NITRITE UR-MCNC: POSITIVE
NRBC # BLD: 0 /100 WBCS — SIGNIFICANT CHANGE UP (ref 0–0)
PH UR: 6.5 — SIGNIFICANT CHANGE UP (ref 5–8)
PLATELET # BLD AUTO: 146 K/UL — LOW (ref 150–400)
POTASSIUM SERPL-MCNC: 4.7 MMOL/L — SIGNIFICANT CHANGE UP (ref 3.5–5.3)
POTASSIUM SERPL-SCNC: 4.7 MMOL/L — SIGNIFICANT CHANGE UP (ref 3.5–5.3)
PROT SERPL-MCNC: 6.9 G/DL — SIGNIFICANT CHANGE UP (ref 6–8.3)
PROT UR-MCNC: ABNORMAL
RBC # BLD: 3.35 M/UL — LOW (ref 4.2–5.8)
RBC # FLD: 16.7 % — HIGH (ref 10.3–14.5)
RBC CASTS # UR COMP ASSIST: 44 /HPF — HIGH (ref 0–4)
SARS-COV-2 RNA SPEC QL NAA+PROBE: SIGNIFICANT CHANGE UP
SODIUM SERPL-SCNC: 140 MMOL/L — SIGNIFICANT CHANGE UP (ref 135–145)
SP GR SPEC: 1.02 — SIGNIFICANT CHANGE UP (ref 1.01–1.02)
TACROLIMUS SERPL-MCNC: 6.2 NG/ML — SIGNIFICANT CHANGE UP
UROBILINOGEN FLD QL: NEGATIVE — SIGNIFICANT CHANGE UP
WBC # BLD: 7.55 K/UL — SIGNIFICANT CHANGE UP (ref 3.8–10.5)
WBC # FLD AUTO: 7.55 K/UL — SIGNIFICANT CHANGE UP (ref 3.8–10.5)
WBC UR QL: 888 /HPF — HIGH (ref 0–5)

## 2021-08-20 PROCEDURE — 99222 1ST HOSP IP/OBS MODERATE 55: CPT

## 2021-08-20 PROCEDURE — 99223 1ST HOSP IP/OBS HIGH 75: CPT | Mod: GC,AI

## 2021-08-20 PROCEDURE — 76776 US EXAM K TRANSPL W/DOPPLER: CPT | Mod: 26,RT

## 2021-08-20 RX ORDER — DEXTROSE 50 % IN WATER 50 %
25 SYRINGE (ML) INTRAVENOUS ONCE
Refills: 0 | Status: DISCONTINUED | OUTPATIENT
Start: 2021-08-20 | End: 2021-08-24

## 2021-08-20 RX ORDER — DEXTROSE 50 % IN WATER 50 %
12.5 SYRINGE (ML) INTRAVENOUS ONCE
Refills: 0 | Status: DISCONTINUED | OUTPATIENT
Start: 2021-08-20 | End: 2021-08-24

## 2021-08-20 RX ORDER — MYCOPHENOLATE MOFETIL 250 MG/1
250 CAPSULE ORAL
Refills: 0 | Status: DISCONTINUED | OUTPATIENT
Start: 2021-08-20 | End: 2021-08-20

## 2021-08-20 RX ORDER — HEPARIN SODIUM 5000 [USP'U]/ML
5000 INJECTION INTRAVENOUS; SUBCUTANEOUS EVERY 8 HOURS
Refills: 0 | Status: DISCONTINUED | OUTPATIENT
Start: 2021-08-20 | End: 2021-08-24

## 2021-08-20 RX ORDER — INSULIN LISPRO 100/ML
VIAL (ML) SUBCUTANEOUS
Refills: 0 | Status: DISCONTINUED | OUTPATIENT
Start: 2021-08-20 | End: 2021-08-24

## 2021-08-20 RX ORDER — ALLOPURINOL 300 MG
100 TABLET ORAL DAILY
Refills: 0 | Status: DISCONTINUED | OUTPATIENT
Start: 2021-08-20 | End: 2021-08-24

## 2021-08-20 RX ORDER — CEFEPIME 1 G/1
1000 INJECTION, POWDER, FOR SOLUTION INTRAMUSCULAR; INTRAVENOUS ONCE
Refills: 0 | Status: COMPLETED | OUTPATIENT
Start: 2021-08-20 | End: 2021-08-20

## 2021-08-20 RX ORDER — DEXTROSE 50 % IN WATER 50 %
15 SYRINGE (ML) INTRAVENOUS ONCE
Refills: 0 | Status: DISCONTINUED | OUTPATIENT
Start: 2021-08-20 | End: 2021-08-24

## 2021-08-20 RX ORDER — CALCIUM CARBONATE 500(1250)
1 TABLET ORAL THREE TIMES A DAY
Refills: 0 | Status: DISCONTINUED | OUTPATIENT
Start: 2021-08-20 | End: 2021-08-24

## 2021-08-20 RX ORDER — TAMSULOSIN HYDROCHLORIDE 0.4 MG/1
0.8 CAPSULE ORAL AT BEDTIME
Refills: 0 | Status: DISCONTINUED | OUTPATIENT
Start: 2021-08-20 | End: 2021-08-24

## 2021-08-20 RX ORDER — METOPROLOL TARTRATE 50 MG
100 TABLET ORAL ONCE
Refills: 0 | Status: COMPLETED | OUTPATIENT
Start: 2021-08-20 | End: 2021-08-20

## 2021-08-20 RX ORDER — TAMSULOSIN HYDROCHLORIDE 0.4 MG/1
1 CAPSULE ORAL
Qty: 0 | Refills: 0 | DISCHARGE

## 2021-08-20 RX ORDER — PIPERACILLIN AND TAZOBACTAM 4; .5 G/20ML; G/20ML
3.38 INJECTION, POWDER, LYOPHILIZED, FOR SOLUTION INTRAVENOUS EVERY 8 HOURS
Refills: 0 | Status: DISCONTINUED | OUTPATIENT
Start: 2021-08-21 | End: 2021-08-24

## 2021-08-20 RX ORDER — METOPROLOL TARTRATE 50 MG
100 TABLET ORAL DAILY
Refills: 0 | Status: DISCONTINUED | OUTPATIENT
Start: 2021-08-20 | End: 2021-08-24

## 2021-08-20 RX ORDER — TACROLIMUS 5 MG/1
5 CAPSULE ORAL DAILY
Refills: 0 | Status: DISCONTINUED | OUTPATIENT
Start: 2021-08-20 | End: 2021-08-24

## 2021-08-20 RX ORDER — FINASTERIDE 5 MG/1
5 TABLET, FILM COATED ORAL DAILY
Refills: 0 | Status: DISCONTINUED | OUTPATIENT
Start: 2021-08-20 | End: 2021-08-24

## 2021-08-20 RX ORDER — PIPERACILLIN AND TAZOBACTAM 4; .5 G/20ML; G/20ML
3.38 INJECTION, POWDER, LYOPHILIZED, FOR SOLUTION INTRAVENOUS ONCE
Refills: 0 | Status: COMPLETED | OUTPATIENT
Start: 2021-08-20 | End: 2021-08-20

## 2021-08-20 RX ORDER — NIFEDIPINE 30 MG
60 TABLET, EXTENDED RELEASE 24 HR ORAL ONCE
Refills: 0 | Status: COMPLETED | OUTPATIENT
Start: 2021-08-20 | End: 2021-08-20

## 2021-08-20 RX ORDER — TACROLIMUS 5 MG/1
7 CAPSULE ORAL DAILY
Refills: 0 | Status: DISCONTINUED | OUTPATIENT
Start: 2021-08-20 | End: 2021-08-20

## 2021-08-20 RX ORDER — CITRIC ACID/SODIUM CITRATE 300-500 MG
15 SOLUTION, ORAL ORAL THREE TIMES A DAY
Refills: 0 | Status: DISCONTINUED | OUTPATIENT
Start: 2021-08-20 | End: 2021-08-24

## 2021-08-20 RX ORDER — NIFEDIPINE 30 MG
60 TABLET, EXTENDED RELEASE 24 HR ORAL DAILY
Refills: 0 | Status: DISCONTINUED | OUTPATIENT
Start: 2021-08-20 | End: 2021-08-24

## 2021-08-20 RX ORDER — CEFEPIME 1 G/1
INJECTION, POWDER, FOR SOLUTION INTRAMUSCULAR; INTRAVENOUS
Refills: 0 | Status: DISCONTINUED | OUTPATIENT
Start: 2021-08-20 | End: 2021-08-20

## 2021-08-20 RX ORDER — GLUCAGON INJECTION, SOLUTION 0.5 MG/.1ML
1 INJECTION, SOLUTION SUBCUTANEOUS ONCE
Refills: 0 | Status: DISCONTINUED | OUTPATIENT
Start: 2021-08-20 | End: 2021-08-24

## 2021-08-20 RX ORDER — SODIUM CHLORIDE 9 MG/ML
1000 INJECTION, SOLUTION INTRAVENOUS
Refills: 0 | Status: DISCONTINUED | OUTPATIENT
Start: 2021-08-20 | End: 2021-08-24

## 2021-08-20 RX ORDER — SODIUM CHLORIDE 9 MG/ML
1000 INJECTION INTRAMUSCULAR; INTRAVENOUS; SUBCUTANEOUS
Refills: 0 | Status: DISCONTINUED | OUTPATIENT
Start: 2021-08-20 | End: 2021-08-20

## 2021-08-20 RX ORDER — INSULIN LISPRO 100/ML
VIAL (ML) SUBCUTANEOUS AT BEDTIME
Refills: 0 | Status: DISCONTINUED | OUTPATIENT
Start: 2021-08-20 | End: 2021-08-24

## 2021-08-20 RX ADMIN — Medication 1 TABLET(S): at 16:20

## 2021-08-20 RX ADMIN — Medication 1 TABLET(S): at 22:27

## 2021-08-20 RX ADMIN — Medication 100 MILLIGRAM(S): at 13:45

## 2021-08-20 RX ADMIN — TACROLIMUS 7 MILLIGRAM(S): 5 CAPSULE ORAL at 09:25

## 2021-08-20 RX ADMIN — TAMSULOSIN HYDROCHLORIDE 0.8 MILLIGRAM(S): 0.4 CAPSULE ORAL at 22:26

## 2021-08-20 RX ADMIN — Medication 100 MILLIGRAM(S): at 09:24

## 2021-08-20 RX ADMIN — PIPERACILLIN AND TAZOBACTAM 200 GRAM(S): 4; .5 INJECTION, POWDER, LYOPHILIZED, FOR SOLUTION INTRAVENOUS at 21:10

## 2021-08-20 RX ADMIN — SODIUM CHLORIDE 75 MILLILITER(S): 9 INJECTION INTRAMUSCULAR; INTRAVENOUS; SUBCUTANEOUS at 16:21

## 2021-08-20 RX ADMIN — CEFEPIME 100 MILLIGRAM(S): 1 INJECTION, POWDER, FOR SOLUTION INTRAMUSCULAR; INTRAVENOUS at 13:46

## 2021-08-20 RX ADMIN — Medication 5 MILLIGRAM(S): at 09:24

## 2021-08-20 RX ADMIN — HEPARIN SODIUM 5000 UNIT(S): 5000 INJECTION INTRAVENOUS; SUBCUTANEOUS at 13:46

## 2021-08-20 RX ADMIN — FINASTERIDE 5 MILLIGRAM(S): 5 TABLET, FILM COATED ORAL at 13:45

## 2021-08-20 RX ADMIN — Medication 1: at 13:43

## 2021-08-20 RX ADMIN — Medication 1: at 17:23

## 2021-08-20 RX ADMIN — MYCOPHENOLATE MOFETIL 250 MILLIGRAM(S): 250 CAPSULE ORAL at 09:24

## 2021-08-20 RX ADMIN — Medication 60 MILLIGRAM(S): at 09:24

## 2021-08-20 RX ADMIN — HEPARIN SODIUM 5000 UNIT(S): 5000 INJECTION INTRAVENOUS; SUBCUTANEOUS at 22:27

## 2021-08-20 RX ADMIN — Medication 15 MILLILITER(S): at 16:19

## 2021-08-20 NOTE — H&P ADULT - NSICDXFAMILYHX_GEN_ALL_CORE_FT
FAMILY HISTORY:  Father  Still living? Unknown  FH: type 2 diabetes, Age at diagnosis: Age Unknown    Mother  Still living? Unknown  FH: breast cancer, Age at diagnosis: Age Unknown    Sibling  Still living? No  FH: heart attack, Age at diagnosis: Age Unknown

## 2021-08-20 NOTE — H&P ADULT - PROBLEM SELECTOR PLAN 5
Pt has documented history of BPH, in addition to prostate cancer.    Plan  -- Continue home finasteride 5 mg  -- Continue Flomax

## 2021-08-20 NOTE — H&P ADULT - NSHPREVIEWOFSYSTEMS_GEN_ALL_CORE
REVIEW OF SYSTEMS:    CONSTITUTIONAL:  Denies weight loss, fever, chills, weakness or fatigue, headache.  EYES:  Denies vision loss, blurred vision, double vision or yellow sclerae.   ENT:  Denies hearing loss, sneezing, congestion, runny nose or sore throat.  SKIN:  Denies rash or itching.  CARDIOVASCULAR:  Denies chest pain, chest pressure or chest discomfort. Denies palpitations, or tachycardia.  RESPIRATORY:  Denies shortness of breath, cough or sputum.  GASTROINTESTINAL:  Denies anorexia, nausea, vomiting, constipation, or diarrhea. Denies abdominal pain or blood.  GENITOURINARY:  Denies hematuria, dysuria.   NEUROLOGICAL:  Denies headache, dizziness, syncope, numbness or tingling in the extremities.  MUSCULOSKELETAL:  Denies muscle, back pain, joint pain or stiffness.  HEMATOLOGIC:  Denies anemia, bleeding or bruising.  LYMPHATICS:  Denies enlarged nodes.   PSYCHIATRIC:  Denies history of depression or anxiety.  ENDOCRINOLOGIC:  Denies reports of sweating, cold or heat intolerance. Deniespolyuria or polydipsia.  ALLERGIES:  Denies history of asthma, hives, eczema or rhinitis. REVIEW OF SYSTEMS:    CONSTITUTIONAL:  Denies weight loss, fever, chills, weakness or fatigue, headache.  SKIN:  Denies rash.  CARDIOVASCULAR:  Denies chest pain, chest pressure or chest discomfort.   RESPIRATORY:  Denies shortness of breath.  GASTROINTESTINAL:  Denies anorexia, nausea, vomiting, abdominal pain. Positive for constipation, or diarrhea.   GENITOURINARY:  Denies hematuria, dysuria.   NEUROLOGICAL:  Positive numbness in hands.  MUSCULOSKELETAL:  Denies muscle, back pain, joint pain or stiffness.

## 2021-08-20 NOTE — CONSULT NOTE ADULT - SUBJECTIVE AND OBJECTIVE BOX
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     Pt was having urinary retention 3 weeks ago, and was admitted to hospital at that time where he was found to have a pseudomonal UTI at [facility], as well as CHELA likely 2/2 urinary retention. Treated w/ course of Cefepime and had an indwelling Velez placed at that time. He had blood work performed on  and was notified of an elevated Cr as well as potential UTI, and came to the ED yesterday. Pt reports having cloudy urine for the past 4 days. Pt is otherwise asymptomatic.  Pt denies f/c, n/v, abdominal pain, rashes, swelling, erythema, SOB. Producing urine well via Velez, reports no hematuria, no dysuria.     PAST HISTORY  --------------------------------------------------------------------------------  PAST MEDICAL & SURGICAL HISTORY:  HTN (hypertension)    Type 2 diabetes mellitus  dx: &#x27;88    ESRD (end stage renal disease)  On Dialysis &#x27;  to 2018    Hepatitis C  In Donor Kidney: patient received treatment for Hep. 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 s/p Reversal    S/P right knee arthroscopy  &#x27; 90&#x27;s    Kidney transplant recipient  2018  @ [facility] :  +  Hep C Donor      FAMILY HISTORY:  FH: breast cancer (Mother)    FH: type 2 diabetes (Father)    FH: heart attack (Sibling)  age 54 at death      PAST SOCIAL HISTORY:    ALLERGIES & MEDICATIONS  --------------------------------------------------------------------------------  Allergies    No Known Allergies    Intolerances      Standing Inpatient Medications  allopurinol 100 milliGRAM(s) Oral daily  calcium carbonate    500 mG (Tums) Chewable 1 Tablet(s) Chew three times a day  cefepime   IVPB      cefepime   IVPB 1000 milliGRAM(s) IV Intermittent once  citric acid/sodium citrate Solution 15 milliLiter(s) Oral three times a day  dextrose 40% Gel 15 Gram(s) Oral once  dextrose 5%. 1000 milliLiter(s) IV Continuous <Continuous>  dextrose 5%. 1000 milliLiter(s) IV Continuous <Continuous>  dextrose 50% Injectable 25 Gram(s) IV Push once  dextrose 50% Injectable 12.5 Gram(s) IV Push once  dextrose 50% Injectable 25 Gram(s) IV Push once  finasteride 5 milliGRAM(s) Oral daily  glucagon  Injectable 1 milliGRAM(s) IntraMuscular once  heparin   Injectable 5000 Unit(s) SubCutaneous every 8 hours  insulin lispro (ADMELOG) corrective regimen sliding scale   SubCutaneous three times a day before meals  insulin lispro (ADMELOG) corrective regimen sliding scale   SubCutaneous at bedtime  metoprolol succinate  milliGRAM(s) Oral daily  mycophenolate mofetil 250 milliGRAM(s) Oral two times a day  NIFEdipine XL 60 milliGRAM(s) Oral daily  predniSONE   Tablet 5 milliGRAM(s) Oral daily  tacrolimus ER Tablet (ENVARSUS XR) 7 milliGRAM(s) Oral daily  tamsulosin 0.8 milliGRAM(s) Oral at bedtime  trimethoprim   80 mG/sulfamethoxazole 400 mG 1 Tablet(s) Oral daily    PRN Inpatient Medications      REVIEW OF SYSTEMS  --------------------------------------------------------------------------------  Gen: No weight changes, fatigue, fevers/chills, weakness  Skin: No rashes  Head/Eyes/Ears/Mouth: No headache; Normal hearing; Normal vision w/o blurriness; No sinus pain/discomfort, sore throat  Respiratory: No dyspnea, cough, wheezing, hemoptysis  CV: No chest pain, PND, orthopnea  GI: No abdominal pain, diarrhea, constipation, nausea, vomiting, melena, hematochezia  : No increased frequency, dysuria, hematuria, nocturia  MSK: No joint pain/swelling; no back pain; no edema  Neuro: No dizziness/lightheadedness, weakness, seizures, numbness, tingling  Heme: No easy bruising or bleeding  Endo: No heat/cold intolerance  Psych: No significant nervousness, anxiety, stress, depression    All other systems were reviewed and are negative, except as noted.    VITALS/PHYSICAL EXAM  --------------------------------------------------------------------------------  T(C): 36.6 (21 @ 10:48), Max: 36.9 (21 @ 20:11)  HR: 68 (21 @ 10:48) (63 - 80)  BP: 150/69 (21 @ 10:48) (143/70 - 163/73)  RR: 20 (21 @ 10:48) (18 - 20)  SpO2: 98% (21 @ 10:48) (98% - 98%)  Wt(kg): --  Height (cm): 179.1 (21 @ 20:11)  Weight (kg): 95.3 (21 @ 20:11)  BMI (kg/m2): 29.7 (21 @ 20:11)  BSA (m2): 2.14 (21 @ 20:11)      Physical Exam:  	Gen: NAD, well-appearing  	HEENT: PERRL, supple neck, clear oropharynx  	Pulm: CTA B/L  	CV: RRR, S1S2; no rub  	Back: No spinal or CVA tenderness; no sacral edema  	Abd: +BS, soft, nontender/nondistended                      Transplant:  	: No suprapubic tenderness  	UE: Warm, FROM, no clubbing, intact strength; no edema; no asterixis  	LE: Warm, FROM, no clubbing, intact strength; no edema  	Neuro: No focal deficits, intact gait  	Psych: Normal affect and mood  	Skin: Warm, without rashes  	Vascular access:    LABS/STUDIES  --------------------------------------------------------------------------------              10.1   7.55  >-----------<  146      [21 @ 02:28]              33.6     140  |  105  |  34  ----------------------------<  151      [21 @ 02:28]  4.7   |  22  |  3.12        Ca     10.1     [21 @ 02:28]    TPro  6.9  /  Alb  4.1  /  TBili  0.2  /  DBili  x   /  AST  17  /  ALT  13  /  AlkPhos  82  [21 @ 02:28]          Creatinine Trend:  SCr 3.12 [ 02:28]  SCr 2.59 [ 07:18]  SCr 2.84 [ 05:30]  SCr 2.98 [ 05:45]  SCr 3.23 [ 16:14]    Urinalysis - [21 @ 04:00]      Color Light Orange / Appearance Turbid / SG 1.017 / pH 6.5      Gluc Negative / Ketone Negative  / Bili Negative / Urobili Negative       Blood Moderate / Protein 300 mg/dL / Leuk Est Large / Nitrite Positive      RBC 44 /  / Hyaline 1 / Gran  / Sq Epi  / Non Sq Epi 0 / Bacteria Negative      Iron 75, TIBC 278, %sat 27      [21 @ 16:51]  Ferritin 450      [21 @ 17:06]  PTH -- (Ca 10.3)      [21 @ 09:37]   22  PTH -- (Ca --)      [21 @ 17:06]   15  Vitamin D (25OH) 37.0      [21 @ 09:37]  HbA1c 6.0      [19 @ 23:48]    HBsAb <3.0      [19 @ 22:44]  HBsAg Nonreact      [19 @ 22:44]  HBcAb Nonreact      [19 @ 22:44]  HCV 0.07, Nonreact      [19 22:44]  HIV Nonreact      [08-15-18 @ 14:37]      TacrolimusTacrolimus (), Serum: 6.2 ng/mL ( 04:19)    Cyclosporine  Sirolimus  Mycophenolate  BK PCR  CMV PCR  Parvo PCR  EBV PCR

## 2021-08-20 NOTE — CONSULT NOTE ADULT - SUBJECTIVE AND OBJECTIVE BOX
The patient is a 70 year old male who presents with a chief complaint of cloudy urine.     HPI:  The patient is a 70 year old male with PMHx of RCC s/p bilateral nephrectomy (), HCV due to positive kidney donor s/p treatment, DDRT (), ureteral stricture of transplant s/p ureteral stent, current prostate CA on radiation therapy, recurrent Pseudomonal UTIs (hospitalized ; ), previous SBO s/p ileostomy w/ reversal , BPH, HTN, and DM who presents to [facility] with elevated creatinine after being found to have outpatient blood work on . He had a recent admission for pseudomonal UTI also a/w CHELA due to urinary retention. At that time, he was treated with IV cefepime and had indwelling Velez catheter placed. He reports cloudy urine otherwise asymptomatic. HE reports occasional diarrhea along with chronic hand numbness for several years. He denies fever, chills, chest pain, dizziness, dyspnea, cough palpitations, abdominal pain, nausea, vomiting, or constipation. He his Velez removed in the ER but was re-inserted after failed voiding trial.       Labs showed macrocytic anemia with reactive thrombocytosis. BUN 34, Cr 3.14, UA with 888 WBCs, proteinuria, + LE, and + nitrite. COVID-19 PCR was negative. UCx was sent and is pending. Transplant renal ultrasound is pending. He was started on IV cefepime. ID was consulted for antibiotic recommendations for urinary tract infection.     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 s/p Reversal  S/P right knee arthroscopy  Kidney transplant recipient 2018, +  Hep C Donor      Allergies  No Known Allergies    ANTIMICROBIALS (past 90 days)  MEDICATIONS  (STANDING):    cefepime   IVPB   100 mL/Hr IV Intermittent (21 @ 13:46)    cefepime   IVPB    trimethoprim   80 mG/sulfamethoxazole 400 mG 1 daily    OTHER MEDS: MEDICATIONS  (STANDING):  allopurinol 100 daily  calcium carbonate    500 mG (Tums) Chewable 1 three times a day  dextrose 40% Gel 15 once  dextrose 50% Injectable 25 once  dextrose 50% Injectable 12.5 once  dextrose 50% Injectable 25 once  finasteride 5 daily  glucagon  Injectable 1 once  heparin   Injectable 5000 every 8 hours  insulin lispro (ADMELOG) corrective regimen sliding scale  three times a day before meals  insulin lispro (ADMELOG) corrective regimen sliding scale  at bedtime  metoprolol succinate  daily  NIFEdipine XL 60 daily  predniSONE   Tablet 5 daily  tacrolimus ER Tablet (ENVARSUS XR) 5 daily  tamsulosin 0.8 at bedtime    SOCIAL HISTORY:  does not smoke, drink alcohol, or use recreational drugs     FAMILY HISTORY:  FH: breast cancer (Mother)  FH: type 2 diabetes (Father)  FH: heart attack (Sibling)  age 54 at death      REVIEW OF SYSTEMS  [  ] ROS unobtainable because:    [x] All other systems negative except as noted below:	    Constitutional:  [ ] fever [ ] chills  [ ] weight loss  [ ] weakness  Skin:  [ ] rash [ ] phlebitis	  Eyes: [ ] icterus [ ] pain  [ ] discharge	  ENMT: [ ] sore throat  [ ] thrush [ ] ulcers [ ] exudates  Respiratory: [ ] dyspnea [ ] hemoptysis [ ] cough [ ] sputum	  Cardiovascular:  [ ] chest pain [ ] palpitations [ ] edema	  Gastrointestinal:  [ ] nausea [ ] vomiting [ ] diarrhea [ ] constipation [ ] pain	  Genitourinary:  [ ] dysuria [ ] frequency [ ] hematuria [x] discharge [ ] flank pain  [ ] incontinence  Musculoskeletal:  [ ] myalgias [ ] arthralgias [ ] arthritis  [ ] back pain  Neurological:  [ ] headache [ ] seizures  [ ] confusion/altered mental status  Psychiatric:  [ ] anxiety [ ] depression	  Hematology/Lymphatics:  [ ] lymphadenopathy  Endocrine:  [ ] adrenal [ ] thyroid  Allergic/Immunologic:	 [x] transplant [ ] seasonal    Vital Signs Last 24 Hrs  T(F): 98.3 (21 @ 13:47), Max: 98.4 (21 @ 20:11)  Vital Signs Last 24 Hrs  HR: 71 (21 @ 13:47) (63 - 80)  BP: 123/70 (21 @ 13:47) (123/70 - 163/73)  RR: 18 (21 @ 13:47)  SpO2: 98% (21 @ 13:47) (98% - 98%)  Wt(kg): --    PHYSICAL EXAM:  Constitutional: non-toxic, no distress  HEAD/EYES: anicteric, no conjunctival injection  ENT:  supple, no thrush  Cardiovascular:   normal S1, S2, no murmur, no edema  Respiratory:  clear BS bilaterally, no wheezes, no rales  GI:  soft, non-tender, normal bowel sounds  :  + Velez in place, no CVA tenderness  Musculoskeletal:  no synovitis, normal ROM  Neurologic: awake and alert, normal strength, no focal findings  Skin:  no rash, no erythema, no phlebitis  Heme/Onc: no lymphadenopathy   Psychiatric:  awake, alert, appropriate mood                            10.1   7.55  )-----------( 146      ( 20 Aug 2021 02:28 )             33.6   08-    140  |  105  |  34<H>  ----------------------------<  151<H>  4.7   |  22  |  3.12<H>    Ca    10.1      20 Aug 2021 02:28    TPro  6.9  /  Alb  4.1  /  TBili  0.2  /  DBili  x   /  AST  17  /  ALT  13  /  AlkPhos  82  08-20    Urinalysis Basic - ( 20 Aug 2021 04:00 )    Color: Light Orange / Appearance: Turbid / S.017 / pH: x  Gluc: x / Ketone: Negative  / Bili: Negative / Urobili: Negative   Blood: x / Protein: 300 mg/dL / Nitrite: Positive   Leuk Esterase: Large / RBC: 44 /hpf /  /HPF   Sq Epi: x / Non Sq Epi: 0 /hpf / Bacteria: Negative    MICROBIOLOGY:     UCx pending    Prior cultures:  21 UCx P. aeruginosa  21 UCx P. aeruginosa  21 UCx P. aeruginosa  21 UCx P. aeruginosa  21 UCx P. aeruginosa  21 UCx P. aeruginosa  21 UCx P. aeruginosa  21 UCx P. aeruginosa  21 UCx P. aeruginosa  21 UCx P. aeruginosa  20 UCx P. aeruginosa  20 UCx CRE P. aeruginosa  29 BCx P. aeruginosa  19 UCx E. coli  19 BCx E. coli  19 UCx E. coli  18 Enterococcus faecalis       RADIOLOGY:    Transplant ultrasound pending     The patient is a 70 year old male who presents with a chief complaint of cloudy urine.     HPI:  The patient is a 70 year old male with PMHx of RCC s/p bilateral nephrectomy (), HCV due to positive kidney donor s/p treatment, DDRT (), ureteral stricture of transplant s/p ureteral stent, current prostate CA on radiation therapy, recurrent Pseudomonal UTIs (hospitalized ; ), previous SBO s/p ileostomy w/ reversal , BPH, HTN, and DM who presents to [facility] with elevated creatinine after being found to have outpatient blood work on . He had a recent admission for pseudomonal UTI also a/w CHELA due to urinary retention. At that time, he was treated with IV cefepime and had indwelling Velez catheter placed. He reports cloudy urine otherwise asymptomatic. HE reports occasional diarrhea along with chronic hand numbness for several years. He denies fever, chills, chest pain, dizziness, dyspnea, cough palpitations, abdominal pain, nausea, vomiting, or constipation. He his Velez removed in the ER but was re-inserted after failed voiding trial.       Labs showed macrocytic anemia with reactive thrombocytosis. BUN 34, Cr 3.14, UA with 888 WBCs, proteinuria, + LE, and + nitrite. COVID-19 PCR was negative. UCx was sent and is pending. Transplant renal ultrasound is pending. He was started on IV cefepime. ID was consulted for antibiotic recommendations for urinary tract infection.     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 s/p Reversal  S/P right knee arthroscopy  Kidney transplant recipient 2018, +  Hep C Donor      Allergies  No Known Allergies    ANTIMICROBIALS (past 90 days)  MEDICATIONS  (STANDING):    cefepime   IVPB   100 mL/Hr IV Intermittent (21 @ 13:46)    cefepime   IVPB    trimethoprim   80 mG/sulfamethoxazole 400 mG 1 daily    OTHER MEDS: MEDICATIONS  (STANDING):  allopurinol 100 daily  calcium carbonate    500 mG (Tums) Chewable 1 three times a day  dextrose 40% Gel 15 once  dextrose 50% Injectable 25 once  dextrose 50% Injectable 12.5 once  dextrose 50% Injectable 25 once  finasteride 5 daily  glucagon  Injectable 1 once  heparin   Injectable 5000 every 8 hours  insulin lispro (ADMELOG) corrective regimen sliding scale  three times a day before meals  insulin lispro (ADMELOG) corrective regimen sliding scale  at bedtime  metoprolol succinate  daily  NIFEdipine XL 60 daily  predniSONE   Tablet 5 daily  tacrolimus ER Tablet (ENVARSUS XR) 5 daily  tamsulosin 0.8 at bedtime    SOCIAL HISTORY:  does not smoke, drink alcohol, or use recreational drugs     FAMILY HISTORY:  FH: breast cancer (Mother)  FH: type 2 diabetes (Father)  FH: heart attack (Sibling)  age 54 at death      REVIEW OF SYSTEMS  [  ] ROS unobtainable because:    [x] All other systems negative except as noted below:	    Constitutional:  [ ] fever [ ] chills  [ ] weight loss  [ ] weakness  Skin:  [ ] rash [ ] phlebitis	  Eyes: [ ] icterus [ ] pain  [ ] discharge	  ENMT: [ ] sore throat  [ ] thrush [ ] ulcers [ ] exudates  Respiratory: [ ] dyspnea [ ] hemoptysis [ ] cough [ ] sputum	  Cardiovascular:  [ ] chest pain [ ] palpitations [ ] edema	  Gastrointestinal:  [ ] nausea [ ] vomiting [ ] diarrhea [ ] constipation [ ] pain	  Genitourinary:  [ ] dysuria [ ] frequency [ ] hematuria [x] discharge [ ] flank pain  [ ] incontinence  Musculoskeletal:  [ ] myalgias [ ] arthralgias [ ] arthritis  [ ] back pain  Neurological:  [ ] headache [ ] seizures  [ ] confusion/altered mental status  Psychiatric:  [ ] anxiety [ ] depression	  Hematology/Lymphatics:  [ ] lymphadenopathy  Endocrine:  [ ] adrenal [ ] thyroid  Allergic/Immunologic:	 [x] transplant [ ] seasonal    Vital Signs Last 24 Hrs  T(F): 98.3 (21 @ 13:47), Max: 98.4 (21 @ 20:11)  Vital Signs Last 24 Hrs  HR: 71 (21 @ 13:47) (63 - 80)  BP: 123/70 (21 @ 13:47) (123/70 - 163/73)  RR: 18 (21 @ 13:47)  SpO2: 98% (21 @ 13:47) (98% - 98%)  Wt(kg): --    PHYSICAL EXAM:  Constitutional: non-toxic, no distress  HEAD/EYES: anicteric, no conjunctival injection  ENT:  supple, no thrush  Cardiovascular:   normal S1, S2, no murmur, no edema  Respiratory:  clear BS bilaterally, no wheezes, no rales  GI:  soft, non-tender, normal bowel sounds  :  + Velez in place, no CVA tenderness  Musculoskeletal:  no synovitis, normal ROM  Neurologic: awake and alert, normal strength, no focal findings  Skin:  no rash, no erythema, no phlebitis  Heme/Onc: no lymphadenopathy   Psychiatric:  awake, alert, appropriate mood                            10.1   7.55  )-----------( 146      ( 20 Aug 2021 02:28 )             33.6   08-    140  |  105  |  34<H>  ----------------------------<  151<H>  4.7   |  22  |  3.12<H>    Ca    10.1      20 Aug 2021 02:28    TPro  6.9  /  Alb  4.1  /  TBili  0.2  /  DBili  x   /  AST  17  /  ALT  13  /  AlkPhos  82  08-20    Urinalysis Basic - ( 20 Aug 2021 04:00 )    Color: Light Orange / Appearance: Turbid / S.017 / pH: x  Gluc: x / Ketone: Negative  / Bili: Negative / Urobili: Negative   Blood: x / Protein: 300 mg/dL / Nitrite: Positive   Leuk Esterase: Large / RBC: 44 /hpf /  /HPF   Sq Epi: x / Non Sq Epi: 0 /hpf / Bacteria: Negative    MICROBIOLOGY:     UCx pending    Prior cultures:  21 UCx P. aeruginosa  21 UCx P. aeruginosa  21 UCx P. aeruginosa  21 UCx P. aeruginosa  21 UCx P. aeruginosa  21 UCx P. aeruginosa  21 UCx P. aeruginosa  21 UCx P. aeruginosa  21 UCx P. aeruginosa  21 UCx P. aeruginosa  20 UCx P. aeruginosa  20 UCx CRE P. aeruginosa  29 BCx P. aeruginosa  19 UCx E. coli  19 BCx E. coli  19 UCx E. coli  18 Enterococcus faecalis     Prior microbiology    10/26/20    HBV sAb +  RPR -   Rubella IgG -  HSV 1 IgG -   HSV 2 IgG +  VZV IgG +  Toxoplasmosis IgG +  CMV IgG +  EBV serology: EBNA +  TPA +  FTA +  TB quantiferon negative  CMV PSR positivity in the past       RADIOLOGY:    Transplant ultrasound pending     The patient is a 70 year old male who presents with a chief complaint of cloudy urine.     HPI:  The patient is a 70 year old male with PMHx of RCC s/p bilateral nephrectomy (), HCV due to positive kidney donor s/p treatment, DDRT (), ureteral stricture of transplant s/p ureteral stent, current prostate CA on radiation therapy, recurrent Pseudomonal UTIs (hospitalized ; ), previous SBO s/p ileostomy w/ reversal , BPH, HTN, and DM who presents to [facility] with elevated creatinine after being found to have outpatient blood work on . He had a recent admission for pseudomonal UTI also a/w CHELA due to urinary retention. At that time, he was treated with IV cefepime and had indwelling Velez catheter placed. He reports cloudy urine otherwise asymptomatic. HE reports occasional diarrhea along with chronic hand numbness for several years. He denies fever, chills, chest pain, dizziness, dyspnea, cough palpitations, abdominal pain, nausea, vomiting, or constipation. He his Velez removed in the ER but was re-inserted after failed voiding trial.       Labs showed macrocytic anemia with reactive thrombocytosis. BUN 34, Cr 3.14, UA with 888 WBCs, proteinuria, + LE, and + nitrite. COVID-19 PCR was negative. UCx was sent and is pending. Transplant renal ultrasound is pending. He was started on IV cefepime. ID was consulted for antibiotic recommendations for urinary tract infection.     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 s/p Reversal  S/P right knee arthroscopy  Kidney transplant recipient 2018, +  Hep C Donor      Allergies  No Known Allergies    ANTIMICROBIALS (past 90 days)  MEDICATIONS  (STANDING):    cefepime   IVPB   100 mL/Hr IV Intermittent (21 @ 13:46)    cefepime   IVPB    trimethoprim   80 mG/sulfamethoxazole 400 mG 1 daily    OTHER MEDS: MEDICATIONS  (STANDING):  allopurinol 100 daily  calcium carbonate    500 mG (Tums) Chewable 1 three times a day  dextrose 40% Gel 15 once  dextrose 50% Injectable 25 once  dextrose 50% Injectable 12.5 once  dextrose 50% Injectable 25 once  finasteride 5 daily  glucagon  Injectable 1 once  heparin   Injectable 5000 every 8 hours  insulin lispro (ADMELOG) corrective regimen sliding scale  three times a day before meals  insulin lispro (ADMELOG) corrective regimen sliding scale  at bedtime  metoprolol succinate  daily  NIFEdipine XL 60 daily  predniSONE   Tablet 5 daily  tacrolimus ER Tablet (ENVARSUS XR) 5 daily  tamsulosin 0.8 at bedtime    SOCIAL HISTORY:  does not smoke, drink alcohol, or use recreational drugs     FAMILY HISTORY:  FH: breast cancer (Mother)  FH: type 2 diabetes (Father)  FH: heart attack (Sibling)  age 54 at death      REVIEW OF SYSTEMS  [  ] ROS unobtainable because:    [  ] All other systems negative except as noted below:	    Constitutional:  [ ] fever [ ] chills  [ ] weight loss  [ ] weakness  Skin:  [ ] rash [ ] phlebitis	  Eyes: [ ] icterus [ ] pain  [ ] discharge	  ENMT: [ ] sore throat  [ ] thrush [ ] ulcers [ ] exudates  Respiratory: [ ] dyspnea [ ] hemoptysis [ ] cough [ ] sputum	  Cardiovascular:  [ ] chest pain [ ] palpitations [ ] edema	  Gastrointestinal:  [ ] nausea [ ] vomiting [ ] diarrhea [ ] constipation [ ] pain	  Genitourinary:  [ ] dysuria [ ] frequency [ ] hematuria [x] discharge [ ] flank pain  [ ] incontinence  Musculoskeletal:  [ ] myalgias [ ] arthralgias [ ] arthritis  [ ] back pain  Neurological:  [ ] headache [ ] seizures  [ ] confusion/altered mental status  Psychiatric:  [ ] anxiety [ ] depression	  Hematology/Lymphatics:  [ ] lymphadenopathy  Endocrine:  [ ] adrenal [ ] thyroid  Allergic/Immunologic:	 [x] transplant [ ] seasonal    Vital Signs Last 24 Hrs  T(F): 98.3 (21 @ 13:47), Max: 98.4 (21 @ 20:11)  Vital Signs Last 24 Hrs  HR: 71 (21 @ 13:47) (63 - 80)  BP: 123/70 (21 @ 13:47) (123/70 - 163/73)  RR: 18 (21 @ 13:47)  SpO2: 98% (21 @ 13:47) (98% - 98%)  Wt(kg): --    PHYSICAL EXAM:  Constitutional: non-toxic, no distress  HEAD/EYES: anicteric, no conjunctival injection  ENT:  supple, no thrush  Cardiovascular:   normal S1, S2, no murmur, no edema  Respiratory:  clear BS bilaterally, no wheezes, no rales  GI:  soft, non-tender, normal bowel sounds  :  + Velez in place, no CVA tenderness  Musculoskeletal:  no synovitis, normal ROM  Neurologic: awake and alert, normal strength, no focal findings  Skin:  no rash, no erythema, no phlebitis  Heme/Onc: no lymphadenopathy   Psychiatric:  awake, alert, appropriate mood                            10.1   7.55  )-----------( 146      ( 20 Aug 2021 02:28 )             33.6   08-    140  |  105  |  34<H>  ----------------------------<  151<H>  4.7   |  22  |  3.12<H>    Ca    10.1      20 Aug 2021 02:28    TPro  6.9  /  Alb  4.1  /  TBili  0.2  /  DBili  x   /  AST  17  /  ALT  13  /  AlkPhos  82  08-20    Urinalysis Basic - ( 20 Aug 2021 04:00 )    Color: Light Orange / Appearance: Turbid / S.017 / pH: x  Gluc: x / Ketone: Negative  / Bili: Negative / Urobili: Negative   Blood: x / Protein: 300 mg/dL / Nitrite: Positive   Leuk Esterase: Large / RBC: 44 /hpf /  /HPF   Sq Epi: x / Non Sq Epi: 0 /hpf / Bacteria: Negative    MICROBIOLOGY:     UCx pending    Prior cultures:  21 UCx P. aeruginosa  21 UCx P. aeruginosa  21 UCx P. aeruginosa  21 UCx P. aeruginosa  21 UCx P. aeruginosa  21 UCx P. aeruginosa  21 UCx P. aeruginosa  21 UCx P. aeruginosa  21 UCx P. aeruginosa  21 UCx P. aeruginosa  20 UCx P. aeruginosa  20 UCx CRE P. aeruginosa  29 BCx P. aeruginosa  19 UCx E. coli  19 BCx E. coli  19 UCx E. coli  18 Enterococcus faecalis     Prior microbiology    10/26/20    HBV sAb +  RPR -   Rubella IgG -  HSV 1 IgG -   HSV 2 IgG +  VZV IgG +  Toxoplasmosis IgG +  CMV IgG +  EBV serology: EBNA +  TPA +  FTA +  TB quantiferon negative  CMV PSR positivity in the past       RADIOLOGY:    Transplant ultrasound pending     The patient is a 70 year old male who presents with a chief complaint of cloudy urine.     HPI:  The patient is a 70 year old male with PMHx of RCC s/p bilateral nephrectomy (), HCV due to positive kidney donor s/p treatment, DDRT (), ureteral stricture of transplant s/p ureteral stent, current prostate CA on radiation therapy, recurrent Pseudomonal UTIs (hospitalized ; ), previous SBO s/p ileostomy w/ reversal , BPH, HTN, and DM who presents to [facility] with elevated creatinine after being found to have outpatient blood work on . He had a recent admission for pseudomonal UTI also a/w CHELA due to urinary retention. At that time, he was treated with IV cefepime and had indwelling Velez catheter placed. He reports cloudy urine otherwise asymptomatic. HE reports occasional diarrhea along with chronic hand numbness for several years. He denies fever, chills, chest pain, dizziness, dyspnea, cough palpitations, abdominal pain, nausea, vomiting, or constipation. He his Velez removed in the ER but was re-inserted after failed voiding trial.       Labs showed macrocytic anemia with reactive thrombocytosis. BUN 34, Cr 3.14, UA with 888 WBCs, proteinuria, + LE, and + nitrite. COVID-19 PCR was negative. UCx was sent and is pending. Transplant renal ultrasound is pending. He was started on IV cefepime. ID was consulted for antibiotic recommendations for urinary tract infection.     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 s/p Reversal  S/P right knee arthroscopy  Kidney transplant recipient 2018, +  Hep C Donor      Allergies  No Known Allergies    ANTIMICROBIALS (past 90 days)  MEDICATIONS  (STANDING):    cefepime   IVPB   100 mL/Hr IV Intermittent (21 @ 13:46)    cefepime   IVPB    trimethoprim   80 mG/sulfamethoxazole 400 mG 1 daily    OTHER MEDS: MEDICATIONS  (STANDING):  allopurinol 100 daily  calcium carbonate    500 mG (Tums) Chewable 1 three times a day  dextrose 40% Gel 15 once  dextrose 50% Injectable 25 once  dextrose 50% Injectable 12.5 once  dextrose 50% Injectable 25 once  finasteride 5 daily  glucagon  Injectable 1 once  heparin   Injectable 5000 every 8 hours  insulin lispro (ADMELOG) corrective regimen sliding scale  three times a day before meals  insulin lispro (ADMELOG) corrective regimen sliding scale  at bedtime  metoprolol succinate  daily  NIFEdipine XL 60 daily  predniSONE   Tablet 5 daily  tacrolimus ER Tablet (ENVARSUS XR) 5 daily  tamsulosin 0.8 at bedtime    SOCIAL HISTORY:  does not smoke, drink alcohol, or use recreational drugs     FAMILY HISTORY:  FH: breast cancer (Mother)  FH: type 2 diabetes (Father)  FH: heart attack (Sibling)  age 54 at death      REVIEW OF SYSTEMS  [  ] ROS unobtainable because:    [  ] All other systems negative except as noted below:	    Constitutional:  [ ] fever [ ] chills  [ ] weight loss  [ ] weakness  Skin:  [ ] rash [ ] phlebitis	  Eyes: [ ] icterus [ ] pain  [ ] discharge	  ENMT: [ ] sore throat  [ ] thrush [ ] ulcers [ ] exudates  Respiratory: [ ] dyspnea [ ] hemoptysis [ ] cough [ ] sputum	  Cardiovascular:  [ ] chest pain [ ] palpitations [ ] edema	  Gastrointestinal:  [ ] nausea [ ] vomiting [ ] diarrhea [ ] constipation [ ] pain	  Genitourinary:  [ ] dysuria [ ] frequency [ ] hematuria [x] discharge [ ] flank pain  [ ] incontinence  Musculoskeletal:  [ ] myalgias [ ] arthralgias [ ] arthritis  [ ] back pain  Neurological:  [ ] headache [ ] seizures  [ ] confusion/altered mental status  Psychiatric:  [ ] anxiety [ ] depression	  Hematology/Lymphatics:  [ ] lymphadenopathy  Endocrine:  [ ] adrenal [ ] thyroid  Allergic/Immunologic:	 [x] transplant [ ] seasonal    Vital Signs Last 24 Hrs  T(F): 98.3 (21 @ 13:47), Max: 98.4 (21 @ 20:11)  Vital Signs Last 24 Hrs  HR: 71 (21 @ 13:47) (63 - 80)  BP: 123/70 (21 @ 13:47) (123/70 - 163/73)  RR: 18 (21 @ 13:47)  SpO2: 98% (21 @ 13:47) (98% - 98%)  Wt(kg): --    PHYSICAL EXAM:  Constitutional: non-toxic, no distress  HEAD/EYES: anicteric, no conjunctival injection  ENT:  supple, no thrush  Cardiovascular:   normal S1, S2, no murmur, no edema  Respiratory:  clear BS bilaterally, no wheezes, no rales  GI:  soft, non-tender, normal bowel sounds  :  + Velez in place, no CVA tenderness  Musculoskeletal:  no synovitis, normal ROM  Neurologic: awake and alert, normal strength, no focal findings  Skin:  no rash, no erythema, no phlebitis  Heme/Onc: no lymphadenopathy   Psychiatric:  awake, alert, appropriate mood                            10.1   7.55  )-----------( 146      ( 20 Aug 2021 02:28 )             33.6   08-    140  |  105  |  34<H>  ----------------------------<  151<H>  4.7   |  22  |  3.12<H>    Ca    10.1      20 Aug 2021 02:28    TPro  6.9  /  Alb  4.1  /  TBili  0.2  /  DBili  x   /  AST  17  /  ALT  13  /  AlkPhos  82  08-20    Urinalysis Basic - ( 20 Aug 2021 04:00 )    Color: Light Orange / Appearance: Turbid / S.017 / pH: x  Gluc: x / Ketone: Negative  / Bili: Negative / Urobili: Negative   Blood: x / Protein: 300 mg/dL / Nitrite: Positive   Leuk Esterase: Large / RBC: 44 /hpf /  /HPF   Sq Epi: x / Non Sq Epi: 0 /hpf / Bacteria: Negative    MICROBIOLOGY:     UCx pending    Prior cultures:  21 UCx P. aeruginosa  21 UCx P. aeruginosa  21 UCx P. aeruginosa  21 UCx P. aeruginosa  21 UCx P. aeruginosa  21 UCx P. aeruginosa  21 UCx P. aeruginosa  21 UCx P. aeruginosa  21 UCx P. aeruginosa  21 UCx P. aeruginosa  20 UCx P. aeruginosa  20 UCx CRE P. aeruginosa  29 BCx P. aeruginosa  19 UCx E. coli  19 BCx E. coli  19 UCx E. coli  18 Enterococcus faecalis     Prior microbiology    10/26/20    HBV sAb +  RPR -   Rubella IgG -  HSV 1 IgG -   HSV 2 IgG +  VZV IgG +  Toxoplasmosis IgG +  CMV IgG +  EBV serology: EBNA +  TPA +  FTA +  TB quantiferon negative  CMV PSR positivity in the past       RADIOLOGY:      < from: US Trans Kidney w/ Doppler, Right (21 @ 16:43) >    IMPRESSION:    Again noted, is mild fullness of the transplant collecting system and urothelial thickening, similar in appearance to the prior study dated 2021.    No evidence of a significant renal artery stenosis. Elevated resistive indices are again noted, similar to prior study.    < end of copied text >       The patient is a 70 year old male who presents with a chief complaint of cloudy urine.     HPI:  The patient is a 70 year old male with PMHx of RCC s/p bilateral nephrectomy (), HCV due to positive kidney donor s/p treatment, DDRT (), ureteral stricture of transplant s/p ureteral stent, current prostate CA on radiation therapy, recurrent Pseudomonal UTIs (hospitalized ; ), previous SBO s/p ileostomy w/ reversal , BPH, HTN, and DM who presents to [facility] with elevated creatinine after being found to have outpatient blood work on . He had a recent admission for pseudomonal UTI also a/w CHELA due to urinary retention. At that time, he was treated with IV cefepime and had indwelling Velez catheter placed. He reports cloudy urine otherwise asymptomatic. HE reports occasional diarrhea along with chronic hand numbness for several years. He denies fever, chills, chest pain, dizziness, dyspnea, cough palpitations, abdominal pain, nausea, vomiting, or constipation. He his Velez removed in the ER but was re-inserted after failed voiding trial.       Labs showed macrocytic anemia with reactive thrombocytosis. BUN 34, Cr 3.14, UA with 888 WBCs, proteinuria, + LE, and + nitrite. COVID-19 PCR was negative. UCx was sent and is pending. Transplant renal ultrasound is pending. He was started on IV cefepime. ID was consulted for antibiotic recommendations for urinary tract infection.     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 s/p Reversal  S/P right knee arthroscopy  Kidney transplant recipient 2018, +  Hep C Donor      Allergies  No Known Allergies    ANTIMICROBIALS (past 90 days)  MEDICATIONS  (STANDING):    cefepime   IVPB   100 mL/Hr IV Intermittent (21 @ 13:46)    cefepime   IVPB    trimethoprim   80 mG/sulfamethoxazole 400 mG 1 daily    OTHER MEDS: MEDICATIONS  (STANDING):  allopurinol 100 daily  calcium carbonate    500 mG (Tums) Chewable 1 three times a day  dextrose 40% Gel 15 once  dextrose 50% Injectable 25 once  dextrose 50% Injectable 12.5 once  dextrose 50% Injectable 25 once  finasteride 5 daily  glucagon  Injectable 1 once  heparin   Injectable 5000 every 8 hours  insulin lispro (ADMELOG) corrective regimen sliding scale  three times a day before meals  insulin lispro (ADMELOG) corrective regimen sliding scale  at bedtime  metoprolol succinate  daily  NIFEdipine XL 60 daily  predniSONE   Tablet 5 daily  tacrolimus ER Tablet (ENVARSUS XR) 5 daily  tamsulosin 0.8 at bedtime    SOCIAL HISTORY:  does not smoke, drink alcohol, or use recreational drugs     FAMILY HISTORY:  FH: breast cancer (Mother)  FH: type 2 diabetes (Father)  FH: heart attack (Sibling)  age 54 at death      REVIEW OF SYSTEMS  [  ] ROS unobtainable because:    [ x ] All other systems negative except as noted below:	    Constitutional:  [ ] fever [ ] chills  [ ] weight loss  [ ] weakness  Skin:  [ ] rash [ ] phlebitis	  Eyes: [ ] icterus [ ] pain  [ ] discharge	  ENMT: [ ] sore throat  [ ] thrush [ ] ulcers [ ] exudates  Respiratory: [ ] dyspnea [ ] hemoptysis [ ] cough [ ] sputum	  Cardiovascular:  [ ] chest pain [ ] palpitations [ ] edema	  Gastrointestinal:  [ ] nausea [ ] vomiting [ ] diarrhea [ ] constipation [ ] pain	  Genitourinary:  [ ] dysuria [ ] frequency [ ] hematuria [x] discharge [ ] flank pain  [ ] incontinence  Musculoskeletal:  [ ] myalgias [ ] arthralgias [ ] arthritis  [ ] back pain  Neurological:  [ ] headache [ ] seizures  [ ] confusion/altered mental status  Psychiatric:  [ ] anxiety [ ] depression	  Hematology/Lymphatics:  [ ] lymphadenopathy  Endocrine:  [ ] adrenal [ ] thyroid  Allergic/Immunologic:	 [x] transplant [ ] seasonal    Vital Signs Last 24 Hrs  T(F): 98.3 (21 @ 13:47), Max: 98.4 (21 @ 20:11)  Vital Signs Last 24 Hrs  HR: 71 (21 @ 13:47) (63 - 80)  BP: 123/70 (21 @ 13:47) (123/70 - 163/73)  RR: 18 (21 @ 13:47)  SpO2: 98% (21 @ 13:47) (98% - 98%)  Wt(kg): --    PHYSICAL EXAM:  Constitutional: non-toxic, no distress  HEAD/EYES: anicteric, no conjunctival injection  ENT:  supple, no thrush  Cardiovascular:   normal S1, S2, no murmur, no edema  Respiratory:  clear BS bilaterally, no wheezes, no rales  GI:  soft, non-tender, normal bowel sounds  :  + Velez in place, no CVA tenderness  Musculoskeletal:  no synovitis, normal ROM  Neurologic: awake and alert, normal strength, no focal findings  Skin:  no rash, no erythema, no phlebitis  Heme/Onc: no lymphadenopathy   Psychiatric:  awake, alert, appropriate mood                            10.1   7.55  )-----------( 146      ( 20 Aug 2021 02:28 )             33.6   08-    140  |  105  |  34<H>  ----------------------------<  151<H>  4.7   |  22  |  3.12<H>    Ca    10.1      20 Aug 2021 02:28    TPro  6.9  /  Alb  4.1  /  TBili  0.2  /  DBili  x   /  AST  17  /  ALT  13  /  AlkPhos  82  08-20    Urinalysis Basic - ( 20 Aug 2021 04:00 )    Color: Light Orange / Appearance: Turbid / S.017 / pH: x  Gluc: x / Ketone: Negative  / Bili: Negative / Urobili: Negative   Blood: x / Protein: 300 mg/dL / Nitrite: Positive   Leuk Esterase: Large / RBC: 44 /hpf /  /HPF   Sq Epi: x / Non Sq Epi: 0 /hpf / Bacteria: Negative    MICROBIOLOGY:     UCx pending    Prior cultures:  21 UCx P. aeruginosa  21 UCx P. aeruginosa  21 UCx P. aeruginosa  21 UCx P. aeruginosa  21 UCx P. aeruginosa  21 UCx P. aeruginosa  21 UCx P. aeruginosa  21 UCx P. aeruginosa  21 UCx P. aeruginosa  21 UCx P. aeruginosa  20 UCx P. aeruginosa  20 UCx CRE P. aeruginosa  29 BCx P. aeruginosa  19 UCx E. coli  19 BCx E. coli  19 UCx E. coli  18 Enterococcus faecalis     Prior microbiology    10/26/20    HBV sAb +  RPR -   Rubella IgG -  HSV 1 IgG -   HSV 2 IgG +  VZV IgG +  Toxoplasmosis IgG +  CMV IgG +  EBV serology: EBNA +  TPA +  FTA +  TB quantiferon negative  CMV PSR positivity in the past       RADIOLOGY:    <The imaging below has been reviewed and visualized by me independently. Findings as detailed in report below>    < from: US Trans Kidney w/ Doppler, Right (21 @ 16:43) >    IMPRESSION:    Again noted, is mild fullness of the transplant collecting system and urothelial thickening, similar in appearance to the prior study dated 2021.    No evidence of a significant renal artery stenosis. Elevated resistive indices are again noted, similar to prior study.    < end of copied text >

## 2021-08-20 NOTE — H&P ADULT - PROBLEM SELECTOR PLAN 7
Pt had RCC s/p b/l nephrectomy 2015, follow by renal transplant in 2018 after HD for three years. Pt currently on Envarsus, cellcept, orgovyx, allopurinol, mycophenolate. Recent tacrolimus level from this admission is 6.2. CHELA is most likely due to UTI as opposed to transplant rejection. Patient has been compliant with his home medications, though states he hasn't been taking his Cellcept as he ran out of the medication.    Plan  -- Continue on immunosuppression treatment  -- Consult transplant nephrology team for any recommendations

## 2021-08-20 NOTE — H&P ADULT - NSHPPHYSICALEXAM_GEN_ALL_CORE
T(C): 36.6 (08-20-21 @ 04:40), Max: 36.9 (08-19-21 @ 20:11)  HR: 70 (08-20-21 @ 04:40) (63 - 80)  BP: 158/73 (08-20-21 @ 04:40) (143/70 - 163/73)  RR: 20 (08-20-21 @ 04:40) (18 - 20)  SpO2: 98% (08-20-21 @ 04:40) (98% - 98%)    GENERAL: Well-appearing, well-nourished, NAD  EYES: EOMI, no scleral icterus  LUNG: CTAB, no wheezes, no rhonchi, no accessory muscle use  HEART: RRR, no m/g/r  ABDOMEN: Soft, NT, ND, + bowel sounds, no bruits  EXTREMITIES:  No BLE edema, 2+ DP pulses, no clubbing, no cynanosis  PSYCH: AAOx3, normal affect, normal mood  NEUROLOGY: non-focal, strength 5/5 in all extremities, sensation intact  SKIN: No rashes or lesions

## 2021-08-20 NOTE — H&P ADULT - PROBLEM SELECTOR PLAN 4
Pt has prostate cancer on active radiation therapy. Next scheduled session was supposed to be today.    Plan  -- Spoke with radiation medicine; will hold radiation treatments for now, continue after d/c

## 2021-08-20 NOTE — CONSULT NOTE ADULT - ASSESSMENT
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    1.s/p DDRT in 2018, Allograft function with baseline Cr ~ 2.5- Now with CHELA ,Cr upto 3 likely in the setting of UTI  UA shows WBC count of 888, RBC 44, protein 300, LES large, nitrite positive.  Please send UC and BC  Obtain Txp renal USG  Recommend IVF hydration with NS @ 75 ml/hr for now    2. IS meds- Start Envarsus 5 mg po daily, Check Tac level in am ~ 30 mins PRIOR to morning dose. Hold MMF and continue Prednisone 5 mg po daily.   3. UTI with h/o recurrent pseudomonas UTI in the past.  UC from 8/1 in clinic grew > 100K Pseudomonas.  Started on Cefepime

## 2021-08-20 NOTE — H&P ADULT - PROBLEM SELECTOR PLAN 6
Pt has documented history of hypertension. Currently patient is on atenolol 25 mg qdaily, metoprolol 100 qdaily, nifedipine 30 mg BID. Pt systolic BP at 158 in ED, otherwise has been hemodynamically stable.    Plan  -- Continue home medications during this admission  -- Monitor BP and adjust as necessary

## 2021-08-20 NOTE — H&P ADULT - NSHPLABSRESULTS_GEN_ALL_CORE
Labs:                        10.1   7.55  )-----------( 146      ( 20 Aug 2021 02:28 )             33.6     Auto Eosinophil # 0.07  / Auto Eosinophil % 0.9   / Auto Neutrophil # 5.18  / Auto Neutrophil % 68.6  / BANDS % x        Hgb Trend: 10.1<--    08-20    140  |  105  |  34<H>  ----------------------------<  151<H>  4.7   |  22  |  3.12<H>    Ca    10.1      20 Aug 2021 02:28  TPro  6.9  /  Alb  4.1  /  TBili  0.2  /  DBili  x   /  AST  17  /  ALT  13  /  AlkPhos  82      Creatinine Trend: 3.12<--, 2.59<--, 2.84<--, 2.98<--, 3.23<--, 3.35<--        Urinalysis Basic - ( 20 Aug 2021 04:00 )    Color: Light Orange / Appearance: Turbid / S.017 / pH: x  Gluc: x / Ketone: Negative  / Bili: Negative / Urobili: Negative   Blood: x / Protein: 300 mg/dL / Nitrite: Positive   Leuk Esterase: Large / RBC: 44 /hpf /  /HPF   Sq Epi: x / Non Sq Epi: 0 /hpf / Bacteria: Negative      Labs result reviewed by me.  21 @ 08:50 personally reviewed Labs:                        10.1   7.55  )-----------( 146      ( 20 Aug 2021 02:28 )             33.6     Auto Eosinophil # 0.07  / Auto Eosinophil % 0.9   / Auto Neutrophil # 5.18  / Auto Neutrophil % 68.6  / BANDS % x        Hgb Trend: 10.1<--    08-20    140  |  105  |  34<H>  ----------------------------<  151<H>  4.7   |  22  |  3.12<H>    Ca    10.1      20 Aug 2021 02:28  TPro  6.9  /  Alb  4.1  /  TBili  0.2  /  DBili  x   /  AST  17  /  ALT  13  /  AlkPhos  82      Creatinine Trend: 3.12<--, 2.59<--, 2.84<--, 2.98<--, 3.23<--, 3.35<--        Urinalysis Basic - ( 20 Aug 2021 04:00 )    Color: Light Orange / Appearance: Turbid / S.017 / pH: x  Gluc: x / Ketone: Negative  / Bili: Negative / Urobili: Negative   Blood: x / Protein: 300 mg/dL / Nitrite: Positive   Leuk Esterase: Large / RBC: 44 /hpf /  /HPF   Sq Epi: x / Non Sq Epi: 0 /hpf / Bacteria: Negative      Labs result reviewed by me.  21 @ 08:50

## 2021-08-20 NOTE — H&P ADULT - PROBLEM SELECTOR PLAN 1
Pt has history of CHELA, most recently treated in early August, as well as June. Previously d/t urinary retention, as well as developed Pseudomonal UTI. Patient baseline Cr 2.4, elevated to 2.9 on outpatient bloodwork, 3.12 at ED. Patient currently afebrile, no leukocytosis, asymptomatic. UA shows positive LES, nitrites, WBC count to 800s. Likely repeat offender of UTI as patient continues to produce urine through Velez, obstruction is less likely.     Plan  -- Right renal kidney U/S - assess for obstruction, pyelo, hydronephrosis  -- Start on ertapenem 1 g IV since patient is at risk for MDR UTI  -- F/u on urine culture and sensitivities Pt has history of CHELA, most recently treated in early August, as well as June. Previously d/t urinary retention, as well as developed Pseudomonal UTI. Patient baseline Cr 2.4, elevated to 2.9 on outpatient bloodwork, 3.12 at ED. Patient currently afebrile, no leukocytosis, asymptomatic. UA shows positive LES, nitrites, WBC count to 800s. Likely repeat offender of UTI as patient continues to produce urine through Velez, obstruction is less likely.     Plan  -- Right renal kidney U/S - assess for obstruction, pyelo, hydronephrosis  -- Start on Cefepime 1 g qdaily   -- F/u on urine culture and sensitivities

## 2021-08-20 NOTE — H&P ADULT - NSICDXPASTMEDICALHX_GEN_ALL_CORE_FT
PAST MEDICAL HISTORY:  Anal fissure dx: ' 2018   No surgery    Benign prostatic hypertrophy     Bowel obstruction ' 2017   surgically treated    ESRD (end stage renal disease) On Dialysis ' 2015 to 7/2018    Hepatitis C In Donor Kidney: patient received treatment for Hep. C : post treatment: patient  tested Negative for Hep C    HTN (hypertension)     Medial meniscus tear ' 90's  Right    Prostate cancer     Renal cell carcinoma     Type 2 diabetes mellitus dx: '88    Ureteral stricture of kidney transplant      PAST MEDICAL HISTORY:  Anal fissure dx: ' 2018   No surgery    Benign prostatic hypertrophy     Bowel obstruction ' 2017   surgically treated w/ ileostomy; since reversed    ESRD (end stage renal disease) On Dialysis ' 2015 to 7/2018    Hepatitis C In Donor Kidney: patient received treatment for Hep. C : post treatment: patient  tested Negative for Hep C    HTN (hypertension)     Medial meniscus tear ' 90's  Right    Prostate cancer on active radiation therapy    Renal cell carcinoma s/p b/l nephrectomy and renal transplant in 2018    Type 2 diabetes mellitus dx: '88    Ureteral stricture of kidney transplant 2018

## 2021-08-20 NOTE — H&P ADULT - NSICDXPASTSURGICALHX_GEN_ALL_CORE_FT
PAST SURGICAL HISTORY:  AV fistula 2013/ left forearm    H/O ileostomy 2017   for 3 months. s/p Reversal    Kidney transplant recipient 2018  @ [facility] :  +  Hep C Donor    S/p nephrectomy left 9/15/2015   + Cancer    S/p nephrectomy right 12/10/2015   benign    S/P right knee arthroscopy

## 2021-08-20 NOTE — ED PROVIDER NOTE - PHYSICAL EXAMINATION
Gen: Alert and oriented. Lying comfortably in bed. Answering questions appropriately  HEENT: extra occular movements intact, no nasal discharge, mucous membranes moist  CV: Regular rate and rhythm, +S1/S2, no murmurs/rubs/gallops,   Resp: Clear to ausculation bilaterally, no wheezes/rhonchi/rales  GI: Abdomen soft non-distended, non tender to palpation, transplant palpated in RLQ however is not tender and does not appear/feel swollen  MSK: No open wounds, no bruising, no LE edema, Homans sign negative bl  Neuro: A&Ox4, following commands, moving all four extremities spontaneously  Psych: appropriate mood

## 2021-08-20 NOTE — H&P ADULT - ASSESSMENT
Mr. Damir Medrano is a 69 y/o male w/ PMHx of RCC s/p b/l nephrectomy, renal transplant on immunosuppression s/p ureteral stent, current prostate CA on radiation therapy, recurrent Pseudomonal UTIs, BPH, DM, HTN, recent failed voiding, who presents to ED with elevated Cr on outpatient labs w/ UA at ED positive for WBC, LES, Nitrites, otherwise asymptomatic likely CHELA 2/2 recurrent UTI.

## 2021-08-20 NOTE — ED PROVIDER NOTE - CLINICAL SUMMARY MEDICAL DECISION MAKING FREE TEXT BOX
Joseph Frankel PGY3: 69 yo M sent in for elevated creatinine. VSS. Patient looks well and is non toxic appearing. PE as above. Concern for rejection. Possible UTI as patient had last admission. Less likely obstruction as patient is draining urine. Will get labs, consult nephro, and reassess.

## 2021-08-20 NOTE — H&P ADULT - PROBLEM SELECTOR PLAN 3
Pt has documented history of diabetes mellitus. Pt takes Januvia 25, glimeperide 2 mg. Last A1C documented is 6.6 from 8/3/21. Current glucose level 151 per BMP.    Plan  -- Insulin SS  -- Continue home Januvia and glimepiride as diabetes being well-managed outpatient on these meds

## 2021-08-20 NOTE — H&P ADULT - ATTENDING COMMENTS
69 y/o male w/ PMHx of RCC s/p b/l nephrectomy, renal transplant on immunosuppression s/p ureteral stent, current prostate CA on radiation therapy, recurrent Pseudomonal UTIs, BPH, DM, HTN, recent failed voiding, who presents to ED with elevated Cr on outpatient labs and possible UTI  Pt feels well, no complaints aside from constipation  exam benign    CHEAL on CKD 3 in renal transplant patient, possible prerenal but does have chronic hydro will r/o  -cr 3.1 from 2.5  -per renal, start IV NS @ 75cc/hr  -monitor bmp  -transplant renal US pending  -hold mycopheolate, reduced tacro to 5mg daily and c/w prednisone per transplant renal    UTI: hx of MDRS UTIs  -UA positive  -c/w cefepime 1gram q8 given outpatient urine cx results  -f/u urine cx  -afebrile, HD stable  -transplant ID consulted

## 2021-08-20 NOTE — ED PROVIDER NOTE - OBJECTIVE STATEMENT
69yo M with pmh pf RCC with bl nephrectomy. 71yo M with pmh pf RCC with bl nephrectomy. sp kidney transplant in 2018 with recent admission for urinary retention now with armenta in place presenting from nephrologist for bump in creatinine. Creatinine normally around 2.4 and outpatient it was 2.9. Patient otherwise is asymptomatic. Patient still has armenta in place and is making urine. Denies abdominal pain, swelling of transplant, fevers, n/v, chills.

## 2021-08-20 NOTE — ED PROVIDER NOTE - ATTENDING CONTRIBUTION TO CARE
Pt w hx kidney transplant here due to worsening creatinine on outpt labs. in ED pt is well appearing without complaints. labs here confirm worsening CHELA - pt will be admitted for further care in setting of CHELA. will send UA to ensure no concomitant infection. no current signs of systemic infection.

## 2021-08-20 NOTE — H&P ADULT - HISTORY OF PRESENT ILLNESS
Pt is a 70 y.o male with PMH of RCC s/p b/l nephrectomy, kidney transplant (2018) on immunosuppression s/p ureteral stent, current prostate CA on radiation therapy, recurrent Pseudomonal UTIs, BPH, DM, and HTN who presents to the ED with elevated Cr on outpatient bloodwork from two days ago (8/17). Pt's baseline Cr normally around 2.4 and outpatient Cr was 2.9. Pt reports he could not urinate 3 weeks ago and had armenta placed in urologist's office. Pt reports cloudy urine for the past 4 days. Pt endorses burning sensation when he bends his knee. Pt is otherwise asymptomatic.  Pt denies f/c, n/v, abdominal pain, rashes, swelling, erythema, SOB. Pt reports having diarrhea and constipation since his ileostomy (2017). Pt reports numbness in hands for a few years.   In the ED, pt had armenta removed 8/19 with unsuccessful voiding trial; armenta was then reinserted and pt is currently making urine. ****INCOMPLETE****    Pt is a 70 y.o male with PMHx of RCC s/p b/l nephrectomy (2015), DDRT (2018), ureteral stricture of transplant s/p ureteral stent, current prostate CA on radiation therapy, recurrent Pseudomonal UTIs (hospitalized 6/21; 8/21), previous SBO s/p ileostomy w/ reversal 2017, BPH, HTN, and DM who presents to the ED with elevated Cr on outpatient blood work on 8/17.     Pt was having urinary retention 3 weeks ago, and was admitted to hospital at that time where he was found to have a pseudomonal UTI at [facility], as well as CHELA likely 2/2 urinary retention. Treated w/ course of Cefepime and had an indwelling Velez placed at that time. He had blood work performed on 8/17 and was notified of an elevated Cr as well as potential UTI, and came to the ED yesterday. Pt reports having cloudy urine for the past 4 days. Pt is otherwise asymptomatic.  Pt denies f/c, n/v, abdominal pain, rashes, swelling, erythema, SOB. Producing urine well via Velez, reports no hematuria, no dysuria. Pt reports having diarrhea and constipation since his ileostomy (2017). Previously documented that he received Metoclopramide for inadequate stooling. Additionally, pt reports chronic hand numbness for several years.    In the ED, pt had attempted Velez removal 8/19 with unsuccessful voiding trial; Velez was then reinserted. Denies any symptoms in ED. Blood work shows elevated Cr to 3.12, and slightly hypertensive. Pt is afebrile. Urine in Velez looks clear. UA shows WBC count of 888, RBC 44, protein 300, LES large, nitrite positive. Patient was not started on any medications in the ED and is hemodynamically stable. Renal U/S ordered.     ****INCOMPLETE****    Pt is a 70 y.o male with PMHx of RCC s/p b/l nephrectomy (2015), DDRT (2018), ureteral stricture of transplant s/p ureteral stent, current prostate CA on radiation therapy, recurrent Pseudomonal UTIs (hospitalized 6/21; 8/21), previous SBO s/p ileostomy w/ reversal 2017, BPH, HTN, and DM who presents to the ED with elevated Cr on outpatient blood work on 8/17.     Pt was having urinary retention 3 weeks ago, and was admitted to hospital at that time where he was found to have a pseudomonal UTI at [facility], as well as CHELA likely 2/2 urinary retention. Treated w/ course of Cefepime and had an indwelling Velez placed at that time. He had blood work performed on 8/17 and was notified of an elevated Cr as well as potential UTI, and came to the ED yesterday. Pt reports having cloudy urine for the past 4 days. Pt is otherwise asymptomatic.  Pt denies f/c, n/v, abdominal pain, rashes, swelling, erythema, SOB. Producing urine well via Velez, reports no hematuria, no dysuria. Pt reports having diarrhea and constipation since his ileostomy (2017). Previously documented that he received Metoclopramide for inadequate stooling. Additionally, pt reports chronic hand numbness for several years.    In the ED, pt had attempted Velez removal 8/19 with unsuccessful voiding trial; Velez was then reinserted. Denies any symptoms in ED. Blood work shows elevated Cr to 3.12, and slightly hypertensive. Pt is afebrile. Urine in Velez looks clear. UA shows WBC count of 888, RBC 44, protein 300, LES large, nitrite positive. Patient was not started on any medications in the ED and is hemodynamically stable. Renal U/S ordered.      Pt is a 70 y.o male with PMHx of RCC s/p b/l nephrectomy (2015), DDRT (2018), ureteral stricture of transplant s/p ureteral stent, current prostate CA on radiation therapy, recurrent Pseudomonal UTIs (hospitalized 6/21; 8/21), previous SBO s/p ileostomy w/ reversal 2017, BPH, HTN, and DM who presents to the ED with elevated Cr on outpatient blood work on 8/17.     Pt was having urinary retention 3 weeks ago, and was admitted to hospital at that time where he was found to have a pseudomonal UTI at [facility], as well as CHELA likely 2/2 urinary retention. Treated w/ course of Cefepime and had an indwelling Velez placed at that time. He had blood work performed on 8/17 and was notified of an elevated Cr as well as potential UTI, and came to the ED yesterday. Pt reports having cloudy urine for the past 4 days. Pt is otherwise asymptomatic.  Pt denies f/c, n/v, abdominal pain, rashes, swelling, erythema, SOB. Producing urine well via Velez, reports no hematuria, no dysuria. Pt reports having diarrhea and constipation since his ileostomy (2017). Previously documented that he received Metoclopramide for inadequate stooling. Additionally, pt reports chronic hand numbness for several years.    In the ED, pt had attempted Velez removal 8/19 with unsuccessful voiding trial; Velez was then reinserted. Denies any symptoms in ED. Blood work shows elevated Cr to 3.12, and slightly hypertensive. Pt is afebrile. Urine in Velez looks clear. UA shows WBC count of 888, RBC 44, protein 300, LES large, nitrite positive. Patient was not started on any medications in the ED and is hemodynamically stable. Renal U/S ordered.

## 2021-08-20 NOTE — H&P ADULT - REASON FOR ADMISSION
elevated Cr on outpatient bloodwork Pt is a 70 y.o male presenting with elevated Cr on outpatient bloodwork (8/17). Elevated outpatient Cr level (8/17)

## 2021-08-20 NOTE — CONSULT NOTE ADULT - ASSESSMENT
Patient has a medical question for the nurse and would like a call today. Please call [phone]. Thank you.    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 He had a recent admission for pseudomonal UTI also a/w CHELA due to urinary retention. He was admitted for chronic urinary retention and UTI. ID was consulted for antibiotic recommendations.      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 He had a recent admission for pseudomonal UTI also a/w CHELA due to urinary retention. He was admitted for chronic urinary retention and UTI. ID was consulted for antibiotic recommendations.      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 He had a recent admission for pseudomonal UTI also a/w CHELA due to urinary retention. He was admitted for chronic urinary retention and UTI. ID was consulted for antibiotic recommendations.      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 He had a recent admission for pseudomonal UTI also a/w CHELA due to urinary retention. He was admitted for chronic urinary retention and UTI. ID was consulted for antibiotic recommendations.      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 He had a recent admission for pseudomonal UTI also a/w CHELA due to urinary retention. He was admitted for chronic urinary retention and UTI. ID was consulted for antibiotic recommendations.      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 He had a recent admission for pseudomonal UTI also a/w CHELA due to urinary retention. He was admitted for chronic urinary retention and UTI. ID was consulted for antibiotic recommendations.    Plan:    #Abnormal Urinalysis, CHELA  - switch to IV zosyn 3.375 q8 hours   - obtain BCx x 2  - UCx in process  - Transplant renal ultrasound pending    # Renal transplant recipient   - monitor immunosuppression   - c/w bactrim prophylaxis  - rest of management as per primary team    Gonzalez Devlin MD PGY-5  Fellow, Infectious Diseases   Pager: [phone]  If no response, after 5pm and on weekends: Call [phone]

## 2021-08-20 NOTE — ED PROVIDER NOTE - PROGRESS NOTE DETAILS
Joseph Frankel PGY3: Patient with creatinine of 3.1 today. UA pending. Given CHELA with no known etiology will admit for further work up. Patient stable at this time.

## 2021-08-20 NOTE — ED ADULT NURSE NOTE - OBJECTIVE STATEMENT
Pt is a 70 yr old male with pmh of renal CA with resection and transplant, HIV, and current prostate CA on radiation coming from home for increased creatinine. Pt states three weeks ago he could not urinate and had a armenta placed in the urologists office. Pt had it removed today with an unsuccessful voiding trial and it was reinserted (8/19). Pt states he had blood and urine tested on Tuesday (8/17) and was called by an MD about his creatinine. Pt denies any symptoms- no urinary symptoms- no chest pain- sob- or headache. Pt is a/o x 3- vitals stable.

## 2021-08-20 NOTE — H&P ADULT - PROBLEM SELECTOR PLAN 2
Pt has a history of recurrent UTI, particularly in the last month as he has had at least 3 episodes. Patient has previously had MDR UTI, pseudomonas UTI sensitive to cefepime. UA positive for UTI as noted above. Patient treated on outpatient Bactrim prophylactically but continues to have UTI and retention. Source of recurrence can be retention, Velez cath present for several weeks    Plan   -- As listed under CHELA  -- Consult transplant nephrology/urology for recommendations  -- Consider potential CT for regional, anatomical factors, particularly i/s/o recurrence Pt has a history of recurrent UTI, particularly in the last month as he has had at least 3 episodes. Patient has previously had MDR UTI, pseudomonas UTI sensitive to cefepime. UA positive for UTI as noted above. Patient treated on outpatient Bactrim prophylactically but continues to have UTI and retention. Source of recurrence can be retention, Velez cath present for several weeks    Plan   -- As listed under CHELA  -- Consult transplant nephrology/urology for recommendations      -- Stop mycophenolate      -- Start tacrolimus 5 mg       -- Start NS at 75 cc/hr

## 2021-08-21 LAB
ALBUMIN SERPL ELPH-MCNC: 3.3 G/DL — SIGNIFICANT CHANGE UP (ref 3.3–5)
ALP SERPL-CCNC: 58 U/L — SIGNIFICANT CHANGE UP (ref 40–120)
ALT FLD-CCNC: 11 U/L — SIGNIFICANT CHANGE UP (ref 10–45)
ANION GAP SERPL CALC-SCNC: 14 MMOL/L — SIGNIFICANT CHANGE UP (ref 5–17)
AST SERPL-CCNC: 19 U/L — SIGNIFICANT CHANGE UP (ref 10–40)
BASOPHILS # BLD AUTO: 0.01 K/UL — SIGNIFICANT CHANGE UP (ref 0–0.2)
BASOPHILS NFR BLD AUTO: 0.2 % — SIGNIFICANT CHANGE UP (ref 0–2)
BILIRUB SERPL-MCNC: 0.4 MG/DL — SIGNIFICANT CHANGE UP (ref 0.2–1.2)
BUN SERPL-MCNC: 30 MG/DL — HIGH (ref 7–23)
CALCIUM SERPL-MCNC: 9.9 MG/DL — SIGNIFICANT CHANGE UP (ref 8.4–10.5)
CHLORIDE SERPL-SCNC: 105 MMOL/L — SIGNIFICANT CHANGE UP (ref 96–108)
CO2 SERPL-SCNC: 20 MMOL/L — LOW (ref 22–31)
COVID-19 SPIKE DOMAIN AB INTERP: POSITIVE
COVID-19 SPIKE DOMAIN ANTIBODY RESULT: >250 U/ML — HIGH
CREAT SERPL-MCNC: 2.56 MG/DL — HIGH (ref 0.5–1.3)
EOSINOPHIL # BLD AUTO: 0.05 K/UL — SIGNIFICANT CHANGE UP (ref 0–0.5)
EOSINOPHIL NFR BLD AUTO: 0.9 % — SIGNIFICANT CHANGE UP (ref 0–6)
GLUCOSE BLDC GLUCOMTR-MCNC: 143 MG/DL — HIGH (ref 70–99)
GLUCOSE BLDC GLUCOMTR-MCNC: 144 MG/DL — HIGH (ref 70–99)
GLUCOSE BLDC GLUCOMTR-MCNC: 187 MG/DL — HIGH (ref 70–99)
GLUCOSE SERPL-MCNC: 148 MG/DL — HIGH (ref 70–99)
HCT VFR BLD CALC: 36.4 % — LOW (ref 39–50)
HGB BLD-MCNC: 11.1 G/DL — LOW (ref 13–17)
IMM GRANULOCYTES NFR BLD AUTO: 0.9 % — SIGNIFICANT CHANGE UP (ref 0–1.5)
LYMPHOCYTES # BLD AUTO: 2.05 K/UL — SIGNIFICANT CHANGE UP (ref 1–3.3)
LYMPHOCYTES # BLD AUTO: 38.1 % — SIGNIFICANT CHANGE UP (ref 13–44)
MAGNESIUM SERPL-MCNC: 1.6 MG/DL — SIGNIFICANT CHANGE UP (ref 1.6–2.6)
MCHC RBC-ENTMCNC: 29.6 PG — SIGNIFICANT CHANGE UP (ref 27–34)
MCHC RBC-ENTMCNC: 30.5 GM/DL — LOW (ref 32–36)
MCV RBC AUTO: 97.1 FL — SIGNIFICANT CHANGE UP (ref 80–100)
MONOCYTES # BLD AUTO: 0.37 K/UL — SIGNIFICANT CHANGE UP (ref 0–0.9)
MONOCYTES NFR BLD AUTO: 6.9 % — SIGNIFICANT CHANGE UP (ref 2–14)
NEUTROPHILS # BLD AUTO: 2.85 K/UL — SIGNIFICANT CHANGE UP (ref 1.8–7.4)
NEUTROPHILS NFR BLD AUTO: 53 % — SIGNIFICANT CHANGE UP (ref 43–77)
NRBC # BLD: 0 /100 WBCS — SIGNIFICANT CHANGE UP (ref 0–0)
PHOSPHATE SERPL-MCNC: 3 MG/DL — SIGNIFICANT CHANGE UP (ref 2.5–4.5)
PLATELET # BLD AUTO: 146 K/UL — LOW (ref 150–400)
POTASSIUM SERPL-MCNC: 5 MMOL/L — SIGNIFICANT CHANGE UP (ref 3.5–5.3)
POTASSIUM SERPL-SCNC: 5 MMOL/L — SIGNIFICANT CHANGE UP (ref 3.5–5.3)
PROT SERPL-MCNC: 6 G/DL — SIGNIFICANT CHANGE UP (ref 6–8.3)
RBC # BLD: 3.75 M/UL — LOW (ref 4.2–5.8)
RBC # FLD: 17.1 % — HIGH (ref 10.3–14.5)
SARS-COV-2 IGG+IGM SERPL QL IA: >250 U/ML — HIGH
SARS-COV-2 IGG+IGM SERPL QL IA: POSITIVE
SODIUM SERPL-SCNC: 139 MMOL/L — SIGNIFICANT CHANGE UP (ref 135–145)
TACROLIMUS SERPL-MCNC: 5 NG/ML — SIGNIFICANT CHANGE UP
WBC # BLD: 5.38 K/UL — SIGNIFICANT CHANGE UP (ref 3.8–10.5)
WBC # FLD AUTO: 5.38 K/UL — SIGNIFICANT CHANGE UP (ref 3.8–10.5)

## 2021-08-21 PROCEDURE — 99233 SBSQ HOSP IP/OBS HIGH 50: CPT | Mod: GC

## 2021-08-21 RX ORDER — MAGNESIUM SULFATE 500 MG/ML
2 VIAL (ML) INJECTION ONCE
Refills: 0 | Status: COMPLETED | OUTPATIENT
Start: 2021-08-21 | End: 2021-08-21

## 2021-08-21 RX ORDER — POLYETHYLENE GLYCOL 3350 17 G/17G
17 POWDER, FOR SOLUTION ORAL DAILY
Refills: 0 | Status: DISCONTINUED | OUTPATIENT
Start: 2021-08-21 | End: 2021-08-23

## 2021-08-21 RX ADMIN — POLYETHYLENE GLYCOL 3350 17 GRAM(S): 17 POWDER, FOR SOLUTION ORAL at 17:24

## 2021-08-21 RX ADMIN — PIPERACILLIN AND TAZOBACTAM 25 GRAM(S): 4; .5 INJECTION, POWDER, LYOPHILIZED, FOR SOLUTION INTRAVENOUS at 00:09

## 2021-08-21 RX ADMIN — Medication 5 MILLIGRAM(S): at 06:10

## 2021-08-21 RX ADMIN — PIPERACILLIN AND TAZOBACTAM 25 GRAM(S): 4; .5 INJECTION, POWDER, LYOPHILIZED, FOR SOLUTION INTRAVENOUS at 09:01

## 2021-08-21 RX ADMIN — HEPARIN SODIUM 5000 UNIT(S): 5000 INJECTION INTRAVENOUS; SUBCUTANEOUS at 13:22

## 2021-08-21 RX ADMIN — HEPARIN SODIUM 5000 UNIT(S): 5000 INJECTION INTRAVENOUS; SUBCUTANEOUS at 22:25

## 2021-08-21 RX ADMIN — Medication 50 GRAM(S): at 09:16

## 2021-08-21 RX ADMIN — Medication 100 MILLIGRAM(S): at 06:13

## 2021-08-21 RX ADMIN — TACROLIMUS 5 MILLIGRAM(S): 5 CAPSULE ORAL at 09:12

## 2021-08-21 RX ADMIN — Medication 15 MILLILITER(S): at 13:22

## 2021-08-21 RX ADMIN — HEPARIN SODIUM 5000 UNIT(S): 5000 INJECTION INTRAVENOUS; SUBCUTANEOUS at 06:10

## 2021-08-21 RX ADMIN — Medication 1 TABLET(S): at 13:50

## 2021-08-21 RX ADMIN — Medication 15 MILLILITER(S): at 00:09

## 2021-08-21 RX ADMIN — FINASTERIDE 5 MILLIGRAM(S): 5 TABLET, FILM COATED ORAL at 13:22

## 2021-08-21 RX ADMIN — Medication 1 TABLET(S): at 22:25

## 2021-08-21 RX ADMIN — Medication 15 MILLILITER(S): at 06:11

## 2021-08-21 RX ADMIN — Medication 100 MILLIGRAM(S): at 13:22

## 2021-08-21 RX ADMIN — Medication 1: at 13:21

## 2021-08-21 RX ADMIN — Medication 1 TABLET(S): at 06:02

## 2021-08-21 RX ADMIN — Medication 1 TABLET(S): at 13:23

## 2021-08-21 RX ADMIN — Medication 60 MILLIGRAM(S): at 06:04

## 2021-08-21 RX ADMIN — PIPERACILLIN AND TAZOBACTAM 25 GRAM(S): 4; .5 INJECTION, POWDER, LYOPHILIZED, FOR SOLUTION INTRAVENOUS at 17:24

## 2021-08-21 RX ADMIN — TAMSULOSIN HYDROCHLORIDE 0.8 MILLIGRAM(S): 0.4 CAPSULE ORAL at 22:25

## 2021-08-21 RX ADMIN — Medication 15 MILLILITER(S): at 22:25

## 2021-08-21 NOTE — PHYSICAL THERAPY INITIAL EVALUATION ADULT - LIVES WITH, PROFILE
Pt lives with spouse in private house with 4 steps to enter +HR, and flight of stairs +HR up to second level. Prior to admission pt was independent in ADLs and ambulation with cane. No DMEs, home care, or HHA services present./spouse

## 2021-08-21 NOTE — PROGRESS NOTE ADULT - SUBJECTIVE AND OBJECTIVE BOX
Gary Kaur MD  PGY-1 Internal Medicine  Pager:  [phone]  Available on Microsoft Teams  21 @ 08:04  _________________________________________________________________________________________________________________________________________    CC:      Patient is a 70y old  Male who presents with a chief complaint of Elevated outpatient Cr level () (20 Aug 2021 14:40)        SUBJECTIVE:    NAEO.      _________________________________________________________________________________________________________________________________________    OBJECTIVE:    Vital Signs Last 24 Hrs  T(C): 36.4 (21 Aug 2021 04:11), Max: 36.8 (20 Aug 2021 13:47)  T(F): 97.5 (21 Aug 2021 04:11), Max: 98.3 (20 Aug 2021 13:47)  HR: 72 (21 Aug 2021 04:11) (68 - 72)  BP: 117/66 (21 Aug 2021 04:11) (109/55 - 150/69)  BP(mean): --  RR: 18 (21 Aug 2021 04:11) (18 - 20)  SpO2: 98% (21 Aug 2021 04:11) (98% - 99%)    I&O's Summary    20 Aug 2021 07:01  -  21 Aug 2021 07:00  --------------------------------------------------------  IN: 0 mL / OUT: 150 mL / NET: -150 mL        MEDICATIONS  (STANDING):  allopurinol 100 milliGRAM(s) Oral daily  calcium carbonate    500 mG (Tums) Chewable 1 Tablet(s) Chew three times a day  citric acid/sodium citrate Solution 15 milliLiter(s) Oral three times a day  dextrose 40% Gel 15 Gram(s) Oral once  dextrose 5%. 1000 milliLiter(s) (50 mL/Hr) IV Continuous <Continuous>  dextrose 5%. 1000 milliLiter(s) (100 mL/Hr) IV Continuous <Continuous>  dextrose 50% Injectable 25 Gram(s) IV Push once  dextrose 50% Injectable 12.5 Gram(s) IV Push once  dextrose 50% Injectable 25 Gram(s) IV Push once  finasteride 5 milliGRAM(s) Oral daily  glucagon  Injectable 1 milliGRAM(s) IntraMuscular once  heparin   Injectable 5000 Unit(s) SubCutaneous every 8 hours  insulin lispro (ADMELOG) corrective regimen sliding scale   SubCutaneous three times a day before meals  insulin lispro (ADMELOG) corrective regimen sliding scale   SubCutaneous at bedtime  metoprolol succinate  milliGRAM(s) Oral daily  NIFEdipine XL 60 milliGRAM(s) Oral daily  piperacillin/tazobactam IVPB.. 3.375 Gram(s) IV Intermittent every 8 hours  predniSONE   Tablet 5 milliGRAM(s) Oral daily  tacrolimus ER Tablet (ENVARSUS XR) 5 milliGRAM(s) Oral daily  tamsulosin 0.8 milliGRAM(s) Oral at bedtime  trimethoprim   80 mG/sulfamethoxazole 400 mG 1 Tablet(s) Oral daily    MEDICATIONS  (PRN):        PHYSICAL EXAM:    GENERAL: Laying comfortably, NAD  EYES: EOMI, PERRL, no scleral icterus  NECK: No JVD  LUNG: Clear to auscultation bilaterally; No wheeze, crackles or rhonci  HEART: Regular rate and rhythm; No murmurs, rubs, or gallops  ABDOMEN: Soft, Nontender, Nondistended  EXTREMITIES:  No LE edema, 2+ Peripheral Pulses, No clubbing, cyanosis, or edema  PSYCH: AAOx3  NEUROLOGY: non-focal, strength 5/5 in all extremities, sensation intact  SKIN: No rashes or lesions      LABS:                            10.1   7.55  )-----------( 146      ( 20 Aug 2021 02:28 )             33.6     Auto Eosinophil # 0.07  / Auto Eosinophil % 0.9   / Auto Neutrophil # 5.18  / Auto Neutrophil % 68.6  / BANDS % x        08-20    140  |  105  |  34<H>  ----------------------------<  151<H>  4.7   |  22  |  3.12<H>    Ca    10.1      20 Aug 2021 02:28  TPro  6.9  /  Alb  4.1  /  TBili  0.2  /  DBili  x   /  AST  17  /  ALT  13  /  AlkPhos  82            Urinalysis Basic - ( 20 Aug 2021 04:00 )    Color: Light Orange / Appearance: Turbid / S.017 / pH: x  Gluc: x / Ketone: Negative  / Bili: Negative / Urobili: Negative   Blood: x / Protein: 300 mg/dL / Nitrite: Positive   Leuk Esterase: Large / RBC: 44 /hpf /  /HPF   Sq Epi: x / Non Sq Epi: 0 /hpf / Bacteria: Negative        RADIOLOGY & ADDITIONAL TESTS:    Imaging Personally Reviewed:    Consultant(s) Notes Reviewed:      Care Discussed with Consultants/Other Providers:      _________________________________________________________________________________________________________________________________________  Gary Kaur MD  PGY-1 Internal Medicine  Pager: [phone]  Available on Microsoft Teams  21 Aug 2021 08:04

## 2021-08-21 NOTE — PHYSICAL THERAPY INITIAL EVALUATION ADULT - SPECIFY REASON(S)
pt remains independent with functional mobility, moving around freely on unit without assist of nursing staff

## 2021-08-21 NOTE — PHYSICAL THERAPY INITIAL EVALUATION ADULT - PERTINENT HX OF CURRENT PROBLEM, REHAB EVAL
69 y/o male w/ PMHx of RCC s/p b/l nephrectomy, renal transplant on immunosuppression s/p ureteral stent, current prostate CA on radiation therapy, recurrent Pseudomonal UTIs, BPH, DM, HTN, recent failed voiding, who presents to ED with elevated Cr on outpatient labs w/ UA at ED positive for WBC, LES, Nitrites, otherwise asymptomatic likely CHELA 2/2 recurrent UTI..

## 2021-08-21 NOTE — DISCHARGE NOTE PROVIDER - NSFOLLOWUPCLINICS_GEN_ALL_ED_FT
[facility]  General Surgery  [address] [place] [address] - [address] [place] [address]  Phone: [phone]  Fax:

## 2021-08-21 NOTE — DISCHARGE NOTE PROVIDER - HOSPITAL COURSE
Mr. Damir Medrano is a 69 y/o male w/ PMHx of RCC s/p b/l nephrectomy, renal transplant on immunosuppression s/p ureteral stent, current prostate CA on radiation therapy, recurrent Pseudomonal UTIs, BPH, DM, HTN, recent failed voiding, who presents to ED with elevated Cr on outpatient labs w/ UA at ED positive for WBC, LES, Nitrites, otherwise asymptomatic likely CHELA 2/2 recurrent UTI.    He presented to the ED with an elevated Cr level on outpatient labs otained on 8/17 to Cr of 2.9 (baseline 2.4). At ED his Cr was elevated to 3.12. He was started on 1g of Cefepime based on prior sensitivities of his UTI earlier this month. Urine culture revealed *****. Repeat renal U/S revealed *****. Transplant nephrology team followed patient w/ adjusted tacrolimus and stopping mycophenlate in setting of infection. Patient received a full course of **** for **** days and was determined to be hemodynamically stable.    Patient was discharged home *****.    Mr. Damir Medrano is a 71 y/o male w/ PMHx of RCC s/p b/l nephrectomy, renal transplant on immunosuppression s/p ureteral stent, current prostate CA on radiation therapy, recurrent Pseudomonal UTIs, BPH, DM, HTN, recent failed voiding, who presents to ED with elevated Cr on outpatient labs w/ UA at ED positive for WBC, LES, Nitrites, otherwise asymptomatic likely CHELA 2/2 recurrent UTI.    He presented to the ED with an elevated Cr level on outpatient labs otained on 8/17 to Cr of 2.9 (baseline 2.4). At ED his Cr was elevated to 3.12. He was started on 1g of Cefepime based on prior sensitivities of his UTI earlier this month. Urine culture revealed Pseudomonal infection resistant for Cipro and Levofloxacin. Repeat renal U/S revealed continue transplant urothelial thickening and mild transplant collecting duct fullness, which were unchanged from previous ultrasound. Transplant nephrology team followed patient w/ adjusted tacrolimus to 5 qdaily and stopping mycophenlate in setting of infection. Patient received a course of Zosyn from 8/21 to **** and ID recommendations **** was determined to be hemodynamically stable.    Patient was discharged home *****.    Mr. Damir Medrano is a 71 y/o male w/ PMHx of RCC s/p b/l nephrectomy, renal transplant on immunosuppression s/p ureteral stent, current prostate CA on radiation therapy, recurrent Pseudomonal UTIs, BPH, DM, HTN, recent failed voiding, who presents to ED with elevated Cr on outpatient labs w/ UA at ED positive for WBC, LES, Nitrites, otherwise asymptomatic likely CHELA 2/2 recurrent UTI.    He presented to the ED with an elevated Cr level on outpatient labs otained on 8/17 to Cr of 2.9 (baseline 2.4). At ED his Cr was elevated to 3.12. He was started on 1g of Cefepime based on prior sensitivities of his UTI earlier this month. Urine culture revealed Pseudomonal infection resistant for Cipro and Levofloxacin. Repeat renal U/S revealed continue transplant urothelial thickening and mild transplant collecting duct fullness, which were unchanged from previous ultrasound. Transplant nephrology team followed patient w/ adjusted tacrolimus to 5 qdaily and stopping mycophenlate in setting of infection. Patient received a course of Zosyn from 8/21 to **** and ID recommendations **** was determined to be hemodynamically stable. No noted additional needs. Patient is now hemodynamically stable and medically optimized for discharge home with referrals to appropriate clinics. Mr. Damir Medrano is a 71 y/o male w/ PMHx of RCC s/p b/l nephrectomy, renal transplant on immunosuppression s/p ureteral stent, current prostate CA on radiation therapy, recurrent Pseudomonal UTIs, BPH, DM, HTN, recent failed voiding, who presents to ED with elevated Cr on outpatient labs w/ UA at ED positive for WBC, LES, Nitrites, otherwise asymptomatic likely CHELA 2/2 recurrent UTI.    He presented to the ED with an elevated Cr level on outpatient labs otained on 8/17 to Cr of 2.9 (baseline 2.4). At ED his Cr was elevated to 3.12. He was started on 1g of Cefepime based on prior sensitivities of his UTI earlier this month. Urine culture revealed Pseudomonal infection resistant for Cipro and Levofloxacin. Repeat renal U/S revealed continue transplant urothelial thickening and mild transplant collecting duct fullness, which were unchanged from previous ultrasound. Transplant nephrology team followed patient w/ adjusted tacrolimus to 5 qdaily and stopping mycophenlate in setting of infection. Patient received a course of Zosyn from 8/21 to 8/25 and was determined to be hemodynamically stable. Patient also had some diarrhea and constipation during his hospital course. Can f/u results of GI PCR, Cdiff outpatient as this is a chronic issue for him since ileostomy procedure and unlikely new cause. Patient should follow up w/ colorectal surgeon for evaluation.    No noted additional needs. Patient is now hemodynamically stable and medically optimized for discharge home with referrals to appropriate clinics.

## 2021-08-21 NOTE — DISCHARGE NOTE PROVIDER - NSDCFUSCHEDAPPT_GEN_ALL_CORE_FT
JUAN CARLOS COPELAND ; 08/31/2021 ; [facility]  JUAN CARLOS COPELAND ; 08/31/2021 ; [facility] [address] [place] [address] JUAN CARLOS COPELAND ; 09/23/2021 ; [facility] [address] [place] [address]

## 2021-08-21 NOTE — PROGRESS NOTE ADULT - PROBLEM SELECTOR PLAN 2
Pt has a history of recurrent UTI, particularly in the last month as he has had at least 3 episodes. Patient has previously had MDR UTI, pseudomonas UTI sensitive to cefepime. UA positive for UTI as noted above. Patient treated on outpatient Bactrim prophylactically but continues to have UTI and retention. Source of recurrence can be retention, Velez cath present for several weeks    Plan   -- As listed under CHELA  -- Consult transplant nephrology/urology for recommendations      -- mycophenolate has been d/c'd       -- Continue tacrolimus 5 mg       -- Start NS at 75 cc/hr

## 2021-08-21 NOTE — PROGRESS NOTE ADULT - SUBJECTIVE AND OBJECTIVE BOX
Gary Kaur MD  PGY-1 Internal Medicine  Pager:  [phone]  Available on [employer] Teams  21 @ 08:04  _________________________________________________________________________________________________________________________________________    CC:      Patient is a 70y old  Male who presents with a chief complaint of Elevated outpatient Cr level () (20 Aug 2021 14:40)      SUBJECTIVE:    Pt has some episodes of diarrhea and constipation. States that he has some anal pain. Denies any blood in stool, or any issues urinating though he states he stills feels its cloudy. Burning on urination only occurs if there is a kink in the Velez. Took a laxative on his own yesterday. No other complaints this morning. Standing comfortably, worked with PT. Receiving antibiotics. Denies any CP, SOB, abdominal pain, edema, no f/c, n/v.       _________________________________________________________________________________________________________________________________________    OBJECTIVE:    Vital Signs Last 24 Hrs  T(C): 36.4 (21 Aug 2021 04:11), Max: 36.8 (20 Aug 2021 13:47)  T(F): 97.5 (21 Aug 2021 04:11), Max: 98.3 (20 Aug 2021 13:47)  HR: 72 (21 Aug 2021 04:11) (68 - 72)  BP: 117/66 (21 Aug 2021 04:11) (109/55 - 150/69)  BP(mean): --  RR: 18 (21 Aug 2021 04:11) (18 - 20)  SpO2: 98% (21 Aug 2021 04:11) (98% - 99%)    I&O's Summary    20 Aug 2021 07:01  -  21 Aug 2021 07:00  --------------------------------------------------------  IN: 0 mL / OUT: 150 mL / NET: -150 mL        MEDICATIONS  (STANDING):  allopurinol 100 milliGRAM(s) Oral daily  calcium carbonate    500 mG (Tums) Chewable 1 Tablet(s) Chew three times a day  citric acid/sodium citrate Solution 15 milliLiter(s) Oral three times a day  dextrose 40% Gel 15 Gram(s) Oral once  dextrose 5%. 1000 milliLiter(s) (50 mL/Hr) IV Continuous <Continuous>  dextrose 5%. 1000 milliLiter(s) (100 mL/Hr) IV Continuous <Continuous>  dextrose 50% Injectable 25 Gram(s) IV Push once  dextrose 50% Injectable 12.5 Gram(s) IV Push once  dextrose 50% Injectable 25 Gram(s) IV Push once  finasteride 5 milliGRAM(s) Oral daily  glucagon  Injectable 1 milliGRAM(s) IntraMuscular once  heparin   Injectable 5000 Unit(s) SubCutaneous every 8 hours  insulin lispro (ADMELOG) corrective regimen sliding scale   SubCutaneous three times a day before meals  insulin lispro (ADMELOG) corrective regimen sliding scale   SubCutaneous at bedtime  metoprolol succinate  milliGRAM(s) Oral daily  NIFEdipine XL 60 milliGRAM(s) Oral daily  piperacillin/tazobactam IVPB.. 3.375 Gram(s) IV Intermittent every 8 hours  predniSONE   Tablet 5 milliGRAM(s) Oral daily  tacrolimus ER Tablet (ENVARSUS XR) 5 milliGRAM(s) Oral daily  tamsulosin 0.8 milliGRAM(s) Oral at bedtime  trimethoprim   80 mG/sulfamethoxazole 400 mG 1 Tablet(s) Oral daily    MEDICATIONS  (PRN):        PHYSICAL EXAM:    GENERAL: Standing comfortably, NAD  EYES: EOMI, no scleral icterus  NECK: No JVD  LUNG: Clear to auscultation bilaterally; No wheeze, crackles or rhonci  HEART: Regular rate and rhythm; No murmurs, rubs, or gallops  ABDOMEN: Soft, Nontender, Nondistended, +BM, no pain to palpation  EXTREMITIES:  No LE edema, 2+ Peripheral Pulses, No clubbing, cyanosis, or edema  PSYCH: AAOx3  NEUROLOGY: non-focal, strength 5/5 in all extremities, sensation intact  SKIN: No rashes or lesions      LABS:                          10.1   7.55  )-----------( 146      ( 20 Aug 2021 02:28 )             33.6     Auto Eosinophil # 0.07  / Auto Eosinophil % 0.9   / Auto Neutrophil # 5.18  / Auto Neutrophil % 68.6  / BANDS % x        -    139  |  105  |  30<H>  ----------------------------<  148<H>  5.0   |  20<L>  |  2.56<H>    Ca    9.9      21 Aug 2021 07:48  Phos  3.0     08-21  Mg     1.6     -    TPro  6.0  /  Alb  3.3  /  TBili  0.4  /  DBili  x   /  AST  19  /  ALT  11  /  AlkPhos  58  08-21      08-20    140  |  105  |  34<H>  ----------------------------<  151<H>  4.7   |  22  |  3.12<H>    Ca    10.1      20 Aug 2021 02:28  TPro  6.9  /  Alb  4.1  /  TBili  0.2  /  DBili  x   /  AST  17  /  ALT  13  /  AlkPhos  82  08-20          Urinalysis Basic - ( 20 Aug 2021 04:00 )    Color: Light Orange / Appearance: Turbid / S.017 / pH: x  Gluc: x / Ketone: Negative  / Bili: Negative / Urobili: Negative   Blood: x / Protein: 300 mg/dL / Nitrite: Positive   Leuk Esterase: Large / RBC: 44 /hpf /  /HPF   Sq Epi: x / Non Sq Epi: 0 /hpf / Bacteria: Negative        RADIOLOGY & ADDITIONAL TESTS:    Renal Ultrasound    Again noted, is mild fullness of the transplant collecting system and urothelial thickening, similar in appearance to the prior study dated 2021.    No evidence of a significant renal artery stenosis. Elevated resistive indices are again noted, similar to prior study.    _________________________________________________________________________________________________________________________________________  Gary Kaur MD  PGY-1 Internal Medicine  Pager: [phone]  Available on Microsoft Teams  21 Aug 2021 08:04

## 2021-08-21 NOTE — DISCHARGE NOTE PROVIDER - NSDCCPCAREPLAN_GEN_ALL_CORE_FT
PRINCIPAL DISCHARGE DIAGNOSIS  Diagnosis: CHELA (acute kidney injury)  Assessment and Plan of Treatment:       SECONDARY DISCHARGE DIAGNOSES  Diagnosis: Acute UTI  Assessment and Plan of Treatment:     Diagnosis: Transplanted kidney  Assessment and Plan of Treatment:      PRINCIPAL DISCHARGE DIAGNOSIS  Diagnosis: CHELA (acute kidney injury)  Assessment and Plan of Treatment: You came into the hospital due to an elevation in your creatinine on outpatient labs, which is a marker of kidney injury. Your CHELA was due to a repeat infection. During your hospital admission, you received antibiotics to treat your infection. Your mycophenelate was held during your time in the hospital, but you should continue this medication once your complete your course of antibiotics. Please continue taking all your other medications as instructed. Please follow up with your urologist/nephrologist for further work up for reason behind repeated infections. Please return to the hospital if experiencing a fever over 100.4, vomiting, blood in your urine, blood in your stool, significant flank pain, significant chest pain, or shortness of breath.      SECONDARY DISCHARGE DIAGNOSES  Diagnosis: Transplanted kidney  Assessment and Plan of Treatment:     Diagnosis: Acute UTI  Assessment and Plan of Treatment:      PRINCIPAL DISCHARGE DIAGNOSIS  Diagnosis: CHELA (acute kidney injury)  Assessment and Plan of Treatment: You came into the hospital due to an elevation in your creatinine on outpatient labs, which is a marker of kidney injury. Your CHELA was due to a repeat infection. During your hospital admission, you received antibiotics to treat your infection. Your mycophenelate was held during your time in the hospital, but you should continue this medication once your complete your course of antibiotics. Please continue taking all your other medications as instructed. Please follow up with your urologist/nephrologist for further work up for reason behind repeated infections. Please return to the hospital if experiencing a fever over 100.4, vomiting, blood in your urine, blood in your stool, significant flank pain, significant chest pain, or shortness of breath.      SECONDARY DISCHARGE DIAGNOSES  Diagnosis: Acute UTI  Assessment and Plan of Treatment: You came into the hospital due to an increase in your creatinine marker. This was due to an acute infection. During your admission, your cultures grew Pseudomonas which was the same bug as previous infections. You were treated with an IV antibiotic for five days and your symptoms improved. Your Cellcept was held due to infection. Please continue taking your Cellcept once you have been discharged. Please continue your immunosuppressive medications. Please follow up with your nephrologist and urologist about repeat infections. Please return to the hospital if you experience significant fever, SOB, blood in urine, blood in stool, significant chest pain or significant flank pain.

## 2021-08-21 NOTE — DISCHARGE NOTE PROVIDER - CARE PROVIDER_API CALL
Arian Bhakta  NEPHROLOGY  [address] [place] [address]  Phone: [phone]  Fax: [phone]  Established Patient  Follow Up Time: 1-3 days    John Phillip)  Urology  [address] [place] [address], [place] [address]  Phone: [phone]  Fax: [phone]  Established Patient  Follow Up Time: 1 week

## 2021-08-21 NOTE — PROGRESS NOTE ADULT - ASSESSMENT
Mr. Damir Medrano is a 71 y/o male w/ PMHx of RCC s/p b/l nephrectomy, renal transplant on immunosuppression s/p ureteral stent, current prostate CA on radiation therapy, recurrent Pseudomonal UTIs, BPH, DM, HTN, recent failed voiding, who presents to ED with elevated Cr on outpatient labs w/ UA at ED positive for WBC, LES, Nitrites, otherwise asymptomatic likely CHELA 2/2 recurrent UTI.

## 2021-08-21 NOTE — DISCHARGE NOTE PROVIDER - CARE PROVIDERS DIRECT ADDRESSES
[email or web address]

## 2021-08-21 NOTE — PHYSICAL THERAPY INITIAL EVALUATION ADULT - ADDITIONAL COMMENTS
8/20/21 US R KIDNEY: Again noted, is mild fullness of the transplant collecting system and urothelial thickening, similar in appearance to the prior study dated 8/1/2021. No evidence of a significant renal artery stenosis. Elevated resistive indices are again noted, similar to prior study.

## 2021-08-21 NOTE — PROGRESS NOTE ADULT - ASSESSMENT
Mr. 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 Currently finishing 7 day course of IV Cefepime and continuing assessment for retention.  Mr. 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 Currently finishing 7 day course of IV Zosyn and continuing assessment for retention.

## 2021-08-21 NOTE — PROGRESS NOTE ADULT - PROBLEM SELECTOR PLAN 1
Pt has history of CHELA, most recently treated in early August, as well as June. Previously d/t urinary retention, as well as developed Pseudomonal UTI. Patient baseline Cr 2.4, elevated to 2.9 on outpatient blood work, 3.12 at ED. Patient currently afebrile, no leukocytosis, asymptomatic. UA shows positive LES, nitrites, WBC count to 800s. Likely repeat offender of UTI as patient continues to produce urine through Velez, obstruction is less likely.     Plan  -- Right renal kidney U/S - no significant changes from prior U/S/imaging  -- Cefepime was changed to Zosyn per transplant ID recommendations (8/20 ---> )  -- F/u on urine culture and sensitivities

## 2021-08-21 NOTE — DISCHARGE NOTE PROVIDER - NSDCMRMEDTOKEN_GEN_ALL_CORE_FT
allopurinol 100 mg oral tablet: 1 tab(s) orally once a day  B Complex 50 oral tablet, extended release: 1 tab(s) orally once a day  Bactrim 400 mg-80 mg oral tablet: 1 tab(s) orally once a day  calcium (as carbonate) 600 mg oral tablet: 1 tab(s) orally 3 times a day  CellCept 250 mg oral capsule: 1 cap(s) orally 2 times a day  Envarsus XR 1 mg oral tablet, extended release: 3 tab(s) orally once a day     take with 4mg tablet for total of 7mg daily envarsus  Envarsus XR 4 mg oral tablet, extended release: 1 tab(s) orally once a day     take with 3 tabs of 1mg envarsus for total of 7mg envarsus daily  finasteride 5 mg oral tablet: 1 tab(s) orally once a day  glimepiride 2 mg oral tablet: 1 tab(s) orally once a day  Januvia 50 mg oral tablet: 1 tab(s) orally once a day  magnesium oxide 400 mg oral tablet: 1 tab(s) orally once a day  metoprolol succinate 100 mg oral tablet, extended release: 1 tab(s) orally once a day  NIFEdipine 30 mg oral tablet, extended release: 2 tab(s) orally once a day  Orgovyx 120 mg oral tablet: 1 tab(s) orally once a day  predniSONE 5 mg oral tablet: 1 tab(s) orally once a day  sodium citrate-citric acid 500 mg-334 mg/5 mL oral solution: 15 milliliter(s) orally 3 times a day  tamsulosin 0.4 mg oral capsule: 2 cap(s) orally once a day   allopurinol 100 mg oral tablet: 1 tab(s) orally once a day  B Complex 50 oral tablet, extended release: 1 tab(s) orally once a day  Bactrim 400 mg-80 mg oral tablet: 1 tab(s) orally once a day  calcium (as carbonate) 600 mg oral tablet: 1 tab(s) orally 3 times a day  CellCept 250 mg oral capsule: 1 cap(s) orally 2 times a day  finasteride 5 mg oral tablet: 1 tab(s) orally once a day  glimepiride 2 mg oral tablet: 1 tab(s) orally once a day  Januvia 50 mg oral tablet: 1 tab(s) orally once a day  magnesium oxide 400 mg oral tablet: 1 tab(s) orally once a day  metoprolol succinate 100 mg oral tablet, extended release: 1 tab(s) orally once a day  NIFEdipine 30 mg oral tablet, extended release: 2 tab(s) orally once a day  Orgovyx 120 mg oral tablet: 1 tab(s) orally once a day  sodium citrate-citric acid 500 mg-334 mg/5 mL oral solution: 15 milliliter(s) orally 3 times a day  tamsulosin 0.4 mg oral capsule: 2 cap(s) orally once a day   allopurinol 100 mg oral tablet: 1 tab(s) orally once a day  B Complex 50 oral tablet, extended release: 1 tab(s) orally once a day  Bactrim 400 mg-80 mg oral tablet: 1 tab(s) orally once a day  calcium (as carbonate) 600 mg oral tablet: 1 tab(s) orally 3 times a day  CellCept 250 mg oral capsule: 1 cap(s) orally 2 times a day  finasteride 5 mg oral tablet: 1 tab(s) orally once a day  glimepiride 2 mg oral tablet: 1 tab(s) orally once a day  Januvia 50 mg oral tablet: 1 tab(s) orally once a day  magnesium oxide 400 mg oral tablet: 1 tab(s) orally once a day  metoprolol succinate 100 mg oral tablet, extended release: 1 tab(s) orally once a day  NIFEdipine 30 mg oral tablet, extended release: 2 tab(s) orally once a day  Orgovyx 120 mg oral tablet: 1 tab(s) orally once a day  predniSONE 5 mg oral tablet: 1 tab(s) orally once a day  sodium citrate-citric acid 500 mg-334 mg/5 mL oral solution: 15 milliliter(s) orally 3 times a day  tacrolimus 1 mg oral tablet, extended release: 5 tab(s) orally once a day  tamsulosin 0.4 mg oral capsule: 2 cap(s) orally once a day

## 2021-08-21 NOTE — DISCHARGE NOTE PROVIDER - PROVIDER TOKENS
PROVIDER:[TOKEN:[[id number]],FOLLOWUP:[1-3 days],ESTABLISHEDPATIENT:[T]],PROVIDER:[TOKEN:[[id number]],FOLLOWUP:[1 week],ESTABLISHEDPATIENT:[T]]

## 2021-08-22 DIAGNOSIS — Z29.9 ENCOUNTER FOR PROPHYLACTIC MEASURES, UNSPECIFIED: ICD-10-CM

## 2021-08-22 LAB
-  AMIKACIN: SIGNIFICANT CHANGE UP
-  AZTREONAM: SIGNIFICANT CHANGE UP
-  CEFEPIME: SIGNIFICANT CHANGE UP
-  CEFTAZIDIME: SIGNIFICANT CHANGE UP
-  CIPROFLOXACIN: SIGNIFICANT CHANGE UP
-  GENTAMICIN: SIGNIFICANT CHANGE UP
-  IMIPENEM: SIGNIFICANT CHANGE UP
-  LEVOFLOXACIN: SIGNIFICANT CHANGE UP
-  MEROPENEM: SIGNIFICANT CHANGE UP
-  PIPERACILLIN/TAZOBACTAM: SIGNIFICANT CHANGE UP
-  TOBRAMYCIN: SIGNIFICANT CHANGE UP
ALBUMIN SERPL ELPH-MCNC: 3.7 G/DL — SIGNIFICANT CHANGE UP (ref 3.3–5)
ALP SERPL-CCNC: 62 U/L — SIGNIFICANT CHANGE UP (ref 40–120)
ALT FLD-CCNC: 11 U/L — SIGNIFICANT CHANGE UP (ref 10–45)
ANION GAP SERPL CALC-SCNC: 15 MMOL/L — SIGNIFICANT CHANGE UP (ref 5–17)
AST SERPL-CCNC: 20 U/L — SIGNIFICANT CHANGE UP (ref 10–40)
BILIRUB SERPL-MCNC: 0.3 MG/DL — SIGNIFICANT CHANGE UP (ref 0.2–1.2)
BUN SERPL-MCNC: 28 MG/DL — HIGH (ref 7–23)
CALCIUM SERPL-MCNC: 10.3 MG/DL — SIGNIFICANT CHANGE UP (ref 8.4–10.5)
CHLORIDE SERPL-SCNC: 101 MMOL/L — SIGNIFICANT CHANGE UP (ref 96–108)
CO2 SERPL-SCNC: 23 MMOL/L — SIGNIFICANT CHANGE UP (ref 22–31)
CREAT SERPL-MCNC: 2.79 MG/DL — HIGH (ref 0.5–1.3)
CULTURE RESULTS: SIGNIFICANT CHANGE UP
GLUCOSE BLDC GLUCOMTR-MCNC: 146 MG/DL — HIGH (ref 70–99)
GLUCOSE BLDC GLUCOMTR-MCNC: 149 MG/DL — HIGH (ref 70–99)
GLUCOSE BLDC GLUCOMTR-MCNC: 157 MG/DL — HIGH (ref 70–99)
GLUCOSE BLDC GLUCOMTR-MCNC: 187 MG/DL — HIGH (ref 70–99)
GLUCOSE SERPL-MCNC: 132 MG/DL — HIGH (ref 70–99)
HCT VFR BLD CALC: 34.5 % — LOW (ref 39–50)
HGB BLD-MCNC: 10.5 G/DL — LOW (ref 13–17)
MAGNESIUM SERPL-MCNC: 1.9 MG/DL — SIGNIFICANT CHANGE UP (ref 1.6–2.6)
MCHC RBC-ENTMCNC: 30.3 PG — SIGNIFICANT CHANGE UP (ref 27–34)
MCHC RBC-ENTMCNC: 30.4 GM/DL — LOW (ref 32–36)
MCV RBC AUTO: 99.7 FL — SIGNIFICANT CHANGE UP (ref 80–100)
METHOD TYPE: SIGNIFICANT CHANGE UP
NRBC # BLD: 0 /100 WBCS — SIGNIFICANT CHANGE UP (ref 0–0)
ORGANISM # SPEC MICROSCOPIC CNT: SIGNIFICANT CHANGE UP
ORGANISM # SPEC MICROSCOPIC CNT: SIGNIFICANT CHANGE UP
PHOSPHATE SERPL-MCNC: 3.3 MG/DL — SIGNIFICANT CHANGE UP (ref 2.5–4.5)
PLATELET # BLD AUTO: SIGNIFICANT CHANGE UP K/UL (ref 150–400)
POTASSIUM SERPL-MCNC: 4.8 MMOL/L — SIGNIFICANT CHANGE UP (ref 3.5–5.3)
POTASSIUM SERPL-SCNC: 4.8 MMOL/L — SIGNIFICANT CHANGE UP (ref 3.5–5.3)
PROT SERPL-MCNC: 6.5 G/DL — SIGNIFICANT CHANGE UP (ref 6–8.3)
RBC # BLD: 3.46 M/UL — LOW (ref 4.2–5.8)
RBC # FLD: 16.9 % — HIGH (ref 10.3–14.5)
SODIUM SERPL-SCNC: 139 MMOL/L — SIGNIFICANT CHANGE UP (ref 135–145)
SPECIMEN SOURCE: SIGNIFICANT CHANGE UP
TACROLIMUS SERPL-MCNC: 4.9 NG/ML — SIGNIFICANT CHANGE UP
WBC # BLD: 3.99 K/UL — SIGNIFICANT CHANGE UP (ref 3.8–10.5)
WBC # FLD AUTO: 3.99 K/UL — SIGNIFICANT CHANGE UP (ref 3.8–10.5)

## 2021-08-22 PROCEDURE — 99233 SBSQ HOSP IP/OBS HIGH 50: CPT | Mod: GC

## 2021-08-22 RX ORDER — ACETAMINOPHEN 500 MG
650 TABLET ORAL ONCE
Refills: 0 | Status: COMPLETED | OUTPATIENT
Start: 2021-08-22 | End: 2021-08-22

## 2021-08-22 RX ADMIN — Medication 650 MILLIGRAM(S): at 21:54

## 2021-08-22 RX ADMIN — TACROLIMUS 5 MILLIGRAM(S): 5 CAPSULE ORAL at 07:40

## 2021-08-22 RX ADMIN — Medication 1: at 12:31

## 2021-08-22 RX ADMIN — Medication 100 MILLIGRAM(S): at 06:13

## 2021-08-22 RX ADMIN — Medication 5 MILLIGRAM(S): at 06:13

## 2021-08-22 RX ADMIN — PIPERACILLIN AND TAZOBACTAM 25 GRAM(S): 4; .5 INJECTION, POWDER, LYOPHILIZED, FOR SOLUTION INTRAVENOUS at 00:37

## 2021-08-22 RX ADMIN — Medication 15 MILLILITER(S): at 06:12

## 2021-08-22 RX ADMIN — Medication 1 TABLET(S): at 06:12

## 2021-08-22 RX ADMIN — Medication 1 TABLET(S): at 13:21

## 2021-08-22 RX ADMIN — PIPERACILLIN AND TAZOBACTAM 25 GRAM(S): 4; .5 INJECTION, POWDER, LYOPHILIZED, FOR SOLUTION INTRAVENOUS at 16:32

## 2021-08-22 RX ADMIN — HEPARIN SODIUM 5000 UNIT(S): 5000 INJECTION INTRAVENOUS; SUBCUTANEOUS at 21:54

## 2021-08-22 RX ADMIN — TAMSULOSIN HYDROCHLORIDE 0.8 MILLIGRAM(S): 0.4 CAPSULE ORAL at 21:54

## 2021-08-22 RX ADMIN — Medication 60 MILLIGRAM(S): at 06:13

## 2021-08-22 RX ADMIN — Medication 1: at 09:03

## 2021-08-22 RX ADMIN — PIPERACILLIN AND TAZOBACTAM 25 GRAM(S): 4; .5 INJECTION, POWDER, LYOPHILIZED, FOR SOLUTION INTRAVENOUS at 09:02

## 2021-08-22 RX ADMIN — Medication 1 TABLET(S): at 12:31

## 2021-08-22 RX ADMIN — FINASTERIDE 5 MILLIGRAM(S): 5 TABLET, FILM COATED ORAL at 12:32

## 2021-08-22 RX ADMIN — Medication 1 TABLET(S): at 21:54

## 2021-08-22 RX ADMIN — Medication 15 MILLILITER(S): at 21:53

## 2021-08-22 RX ADMIN — Medication 100 MILLIGRAM(S): at 12:31

## 2021-08-22 RX ADMIN — HEPARIN SODIUM 5000 UNIT(S): 5000 INJECTION INTRAVENOUS; SUBCUTANEOUS at 13:21

## 2021-08-22 RX ADMIN — HEPARIN SODIUM 5000 UNIT(S): 5000 INJECTION INTRAVENOUS; SUBCUTANEOUS at 06:12

## 2021-08-22 RX ADMIN — Medication 15 MILLILITER(S): at 13:21

## 2021-08-22 RX ADMIN — Medication 650 MILLIGRAM(S): at 23:00

## 2021-08-22 NOTE — PROGRESS NOTE ADULT - ASSESSMENT
Mr. Damir Medrano is a 69 y/o male w/ PMHx of RCC s/p b/l nephrectomy, renal transplant on immunosuppression s/p ureteral stent, current prostate CA on radiation therapy, recurrent Pseudomonal UTIs, BPH, DM, HTN, recent failed voiding, who presents to ED with elevated Cr on outpatient labs w/ UA at ED positive for WBC, leukocyte esterase, Nitrites, otherwise asymptomatic likely CHELA 2/2 recurrent UTI. Mr. Damir Medrano is a 69 y/o male w/ PMHx of RCC s/p b/l nephrectomy, renal transplant on immunosuppression s/p ureteral stent, current prostate CA on radiation therapy, recurrent Pseudomonal UTIs, BPH, DM, HTN, recent failed voiding, who presents to ED with elevated Cr on outpatient labs w/ UA at ED positive for WBC, leukocyte esterase, Nitrites, otherwise asymptomatic likely CHELA 2/2 recurrent UTI currently on antibiotics.

## 2021-08-22 NOTE — PROGRESS NOTE ADULT - SUBJECTIVE AND OBJECTIVE BOX
Contact Information:  Cesar Castillo II, MD, MPH  PGY-3, Internal Medicine  Pager: [phone] ([facility]) /// [phone] ([facility])    JUAN CARLOS COPELAND, MRN-[id number]    Patient is a 70y old  Male who presents with a chief complaint of Elevated outpatient Cr level (8/17) (21 Aug 2021 12:15)      OVERNIGHT EVENTS/INTERVAL/SUBJECTIVE:    CONSTITUTIONAL: No weakness. No fatigue. No fever.  HEAD: No head trauma.   EYES: No vision changes.  ENT: No hearing changes or tinnitus. No ear pain. No changes in smell. No nasal congestion or discharge. No sore throat. No voice hoarseness.   NECK: No neck pain or stiffness. No lumps.  RESPIRATORY: No cough. No SOB. No wheezing. No hemoptysis.   CARDIOVASCULAR: No chest pain. No palpitations.   GASTROINTESTINAL: No dysphagia. No ABD pain. No distension. No constipation. No diarrhea. No pain with defecation. No hematemesis. No hematochezia or melena.  BACK: No back pain.  GENITOURINARY: No dysuria. No frequency or urgency. No hesitancy. No incontinence. No urinary retention. No suprapubic pain. No hematuria.  EXTREMITY: No swelling.  MUSCULOSKELETAL: No joint pain or swelling. No fractures. No stiffness.    SKIN: No rashes. No itching. No skin, hair, or nail changes.  NEUROLOGICAL: No weakness or paralysis. No lightheadedness or dizziness. No HA. No numbness or tingling.   PSYCHIATRIC: No depression.       OBJECTIVE:  Vital Signs Last 24 Hrs  T(C): 36.7 (22 Aug 2021 06:31), Max: 36.9 (21 Aug 2021 20:42)  T(F): 98.1 (22 Aug 2021 06:31), Max: 98.4 (21 Aug 2021 20:42)  HR: 69 (22 Aug 2021 06:31) (65 - 80)  BP: 131/69 (22 Aug 2021 06:31) (111/66 - 132/67)  BP(mean): --  RR: 18 (22 Aug 2021 06:31) (18 - 18)  SpO2: 98% (22 Aug 2021 06:31) (96% - 98%)  I&O's Summary    21 Aug 2021 07:01  -  22 Aug 2021 07:00  --------------------------------------------------------  IN: 480 mL / OUT: 1000 mL / NET: -520 mL        MEDICATIONS  (STANDING):  allopurinol 100 milliGRAM(s) Oral daily  calcium carbonate    500 mG (Tums) Chewable 1 Tablet(s) Chew three times a day  citric acid/sodium citrate Solution 15 milliLiter(s) Oral three times a day  dextrose 40% Gel 15 Gram(s) Oral once  dextrose 5%. 1000 milliLiter(s) (50 mL/Hr) IV Continuous <Continuous>  dextrose 5%. 1000 milliLiter(s) (100 mL/Hr) IV Continuous <Continuous>  dextrose 50% Injectable 25 Gram(s) IV Push once  dextrose 50% Injectable 12.5 Gram(s) IV Push once  dextrose 50% Injectable 25 Gram(s) IV Push once  finasteride 5 milliGRAM(s) Oral daily  glucagon  Injectable 1 milliGRAM(s) IntraMuscular once  heparin   Injectable 5000 Unit(s) SubCutaneous every 8 hours  insulin lispro (ADMELOG) corrective regimen sliding scale   SubCutaneous three times a day before meals  insulin lispro (ADMELOG) corrective regimen sliding scale   SubCutaneous at bedtime  metoprolol succinate  milliGRAM(s) Oral daily  NIFEdipine XL 60 milliGRAM(s) Oral daily  piperacillin/tazobactam IVPB.. 3.375 Gram(s) IV Intermittent every 8 hours  predniSONE   Tablet 5 milliGRAM(s) Oral daily  tacrolimus ER Tablet (ENVARSUS XR) 5 milliGRAM(s) Oral daily  tamsulosin 0.8 milliGRAM(s) Oral at bedtime  trimethoprim   80 mG/sulfamethoxazole 400 mG 1 Tablet(s) Oral daily    MEDICATIONS  (PRN):  polyethylene glycol 3350 17 Gram(s) Oral daily PRN Constipation    Allergies    No Known Allergies    Intolerances        CONSTITUTIONAL: No acute distress. Awake and alert.  HEAD: No evidence of trauma. Structures WNL.  EYES: +PERRL. +EOMI. No scleral icterus. No conjunctival injection.  ENT: Moist oral mucosa. No erythema. No pharyngeal exudates.   NECK: Supple. Appropriate ROM. No stiffness. No masses or lymphadenopathy.  RESPIRATORY: CTAB. No wheezes, rales, or rhonchi. No accessory muscle use. No apparent respiratory distress.  CARDIOVASCULAR: +S1/S2. No audible S3/S4. Regular rate and rhythm. No murmurs, rubs, or gallops. 2+ radial pulses x b/l UE; 2+ DP pulses x b/l LE.   GASTROINTESTINAL: Soft, nontender, nondistended. +BS. No rebound or guarding.   BACK: No spinal or paraspinal tenderness. No CVA tenderness.  EXTREMITY: No LE swelling or edema. EXTs warm to touch.  MUSCULOSKELETAL: Spontaneous movement in all extremities.  DERMATOLOGICAL: No abnormal rashes or lesions.  NEUROLOGICAL: CN 2-12 grossly intact. No focal deficits. Sensation intact x 4EXT. A&Ox3 (oriented to person, place, and time).  PSYCHIATRIC: Appropriate affect.                            10.5   3.99  )-----------( x        ( 22 Aug 2021 07:17 )             34.5       08-22    139  |  101  |  28<H>  ----------------------------<  132<H>  4.8   |  23  |  2.79<H>    Ca    10.3      22 Aug 2021 07:04  Phos  3.3     08-22  Mg     1.9     08-22    TPro  6.5  /  Alb  3.7  /  TBili  0.3  /  DBili  x   /  AST  20  /  ALT  11  /  AlkPhos  62  08-22    CAPILLARY BLOOD GLUCOSE      POCT Blood Glucose.: 143 mg/dL (21 Aug 2021 22:17)  POCT Blood Glucose.: 144 mg/dL (21 Aug 2021 17:20)  POCT Blood Glucose.: 187 mg/dL (21 Aug 2021 12:54)    LIVER FUNCTIONS - ( 22 Aug 2021 07:04 )  Alb: 3.7 g/dL / Pro: 6.5 g/dL / ALK PHOS: 62 U/L / ALT: 11 U/L / AST: 20 U/L / GGT: x                   Culture - Blood (collected 20 Aug 2021 20:55)  Source: .Blood Blood-Peripheral  Preliminary Report (21 Aug 2021 21:01):    No growth to date.    Culture - Urine (collected 20 Aug 2021 09:02)  Source: Clean Catch Clean Catch (Midstream)  Preliminary Report (21 Aug 2021 21:57):    >100,000 CFU/ml Pseudomonas aeruginosa          RADIOLOGY AND ADDITIONAL TESTS:    CONSULTANT NOTES REVIEWED:    CARE DISCUSSED WITH THE FOLLOWING CONSULTANTS/PROVIDERS: Contact Information:  Cesar Castillo II, MD, MPH  PGY-3, Internal Medicine  Pager: [phone] ([facility]) /// [phone] ([facility])    JUAN CARLOS COPELAND, MRN-[id number]    Patient is a 70y old  Male who presents with a chief complaint of Elevated outpatient Cr level (8/17) (21 Aug 2021 12:15)      OVERNIGHT EVENTS/INTERVAL/SUBJECTIVE: No overnight events. Patient evaluated at bedside, no acute complaints, including fever, lightheadedness, dizziness, SOB, urinary symptoms, CP, ABD pain.    CONSTITUTIONAL: No weakness. No fatigue. No fever.  HEAD: No head trauma.   EYES: No vision changes.  ENT: No hearing changes or tinnitus. No ear pain. No changes in smell. No nasal congestion or discharge. No sore throat. No voice hoarseness.   NECK: No neck pain or stiffness. No lumps.  RESPIRATORY: No cough. No SOB. No wheezing. No hemoptysis.   CARDIOVASCULAR: No chest pain. No palpitations.   GASTROINTESTINAL: No dysphagia. No ABD pain. No distension. No constipation. No diarrhea. No pain with defecation. No hematemesis. No hematochezia or melena.  BACK: No back pain.  GENITOURINARY: No dysuria. No frequency or urgency. No hesitancy. No incontinence. No urinary retention. No suprapubic pain. No hematuria.  EXTREMITY: No swelling.  MUSCULOSKELETAL: No joint pain or swelling. No fractures. No stiffness.    SKIN: No rashes. No itching. No skin, hair, or nail changes.  NEUROLOGICAL: No weakness or paralysis. No lightheadedness or dizziness. No HA. No numbness or tingling.   PSYCHIATRIC: No depression.       OBJECTIVE:  Vital Signs Last 24 Hrs  T(C): 36.7 (22 Aug 2021 06:31), Max: 36.9 (21 Aug 2021 20:42)  T(F): 98.1 (22 Aug 2021 06:31), Max: 98.4 (21 Aug 2021 20:42)  HR: 69 (22 Aug 2021 06:31) (65 - 80)  BP: 131/69 (22 Aug 2021 06:31) (111/66 - 132/67)  BP(mean): --  RR: 18 (22 Aug 2021 06:31) (18 - 18)  SpO2: 98% (22 Aug 2021 06:31) (96% - 98%)  I&O's Summary    21 Aug 2021 07:01  -  22 Aug 2021 07:00  --------------------------------------------------------  IN: 480 mL / OUT: 1000 mL / NET: -520 mL        MEDICATIONS  (STANDING):  allopurinol 100 milliGRAM(s) Oral daily  calcium carbonate    500 mG (Tums) Chewable 1 Tablet(s) Chew three times a day  citric acid/sodium citrate Solution 15 milliLiter(s) Oral three times a day  dextrose 40% Gel 15 Gram(s) Oral once  dextrose 5%. 1000 milliLiter(s) (50 mL/Hr) IV Continuous <Continuous>  dextrose 5%. 1000 milliLiter(s) (100 mL/Hr) IV Continuous <Continuous>  dextrose 50% Injectable 25 Gram(s) IV Push once  dextrose 50% Injectable 12.5 Gram(s) IV Push once  dextrose 50% Injectable 25 Gram(s) IV Push once  finasteride 5 milliGRAM(s) Oral daily  glucagon  Injectable 1 milliGRAM(s) IntraMuscular once  heparin   Injectable 5000 Unit(s) SubCutaneous every 8 hours  insulin lispro (ADMELOG) corrective regimen sliding scale   SubCutaneous three times a day before meals  insulin lispro (ADMELOG) corrective regimen sliding scale   SubCutaneous at bedtime  metoprolol succinate  milliGRAM(s) Oral daily  NIFEdipine XL 60 milliGRAM(s) Oral daily  piperacillin/tazobactam IVPB.. 3.375 Gram(s) IV Intermittent every 8 hours  predniSONE   Tablet 5 milliGRAM(s) Oral daily  tacrolimus ER Tablet (ENVARSUS XR) 5 milliGRAM(s) Oral daily  tamsulosin 0.8 milliGRAM(s) Oral at bedtime  trimethoprim   80 mG/sulfamethoxazole 400 mG 1 Tablet(s) Oral daily    MEDICATIONS  (PRN):  polyethylene glycol 3350 17 Gram(s) Oral daily PRN Constipation    Allergies    No Known Allergies    Intolerances        CONSTITUTIONAL: No acute distress. Awake and alert.  RESPIRATORY: CTAB. No wheezes, rales, or rhonchi. No accessory muscle use. No apparent respiratory distress.  CARDIOVASCULAR: +S1/S2. No audible S3/S4. Regular rate and rhythm. No murmurs, rubs, or gallops.    GASTROINTESTINAL: Soft, nontender, nondistended. +BS. No rebound or guarding.   GENITOURINARY: Evlez catheter in place.  EXTREMITY: No LE swelling or edema. EXTs warm to touch.  MUSCULOSKELETAL: Spontaneous movement in all extremities.  NEUROLOGICAL: No focal deficits. A&Ox3 (oriented to person, place, and time).                            10.5   3.99  )-----------( x        ( 22 Aug 2021 07:17 )             34.5       08-22    139  |  101  |  28<H>  ----------------------------<  132<H>  4.8   |  23  |  2.79<H>    Ca    10.3      22 Aug 2021 07:04  Phos  3.3     08-22  Mg     1.9     08-22    TPro  6.5  /  Alb  3.7  /  TBili  0.3  /  DBili  x   /  AST  20  /  ALT  11  /  AlkPhos  62  08-22    CAPILLARY BLOOD GLUCOSE      POCT Blood Glucose.: 143 mg/dL (21 Aug 2021 22:17)  POCT Blood Glucose.: 144 mg/dL (21 Aug 2021 17:20)  POCT Blood Glucose.: 187 mg/dL (21 Aug 2021 12:54)    LIVER FUNCTIONS - ( 22 Aug 2021 07:04 )  Alb: 3.7 g/dL / Pro: 6.5 g/dL / ALK PHOS: 62 U/L / ALT: 11 U/L / AST: 20 U/L / GGT: x                   Culture - Blood (collected 20 Aug 2021 20:55)  Source: .Blood Blood-Peripheral  Preliminary Report (21 Aug 2021 21:01):    No growth to date.    Culture - Urine (collected 20 Aug 2021 09:02)  Source: Clean Catch Clean Catch (Midstream)  Preliminary Report (21 Aug 2021 21:57):    >100,000 CFU/ml Pseudomonas aeruginosa          RADIOLOGY AND ADDITIONAL TESTS:    CONSULTANT NOTES REVIEWED:    CARE DISCUSSED WITH THE FOLLOWING CONSULTANTS/PROVIDERS:

## 2021-08-22 NOTE — PROGRESS NOTE ADULT - PROBLEM SELECTOR PLAN 3
Pt has documented history of diabetes mellitus. Pt takes Januvia 25, glimeperide 2 mg. Last A1C documented is 6.6 from 8/3/21. Current glucose level 151 per BMP.    Plan  -- Insulin SS  -- Continue home Januvia and glimepiride as diabetes being well-managed outpatient on these meds Pt has documented history of diabetes mellitus. Pt takes Januvia 25, glimeperide 2 mg. Last A1C documented is 6.6 from 8/3/21. Current glucose level 151 per BMP.    Plan  -- Insulin SS

## 2021-08-22 NOTE — PROGRESS NOTE ADULT - PROBLEM SELECTOR PLAN 2
Pt has a history of recurrent UTI, particularly in the last month as he has had at least 3 episodes. Patient has previously had MDR UTI, pseudomonas UTI sensitive to cefepime. UA positive for UTI as noted above. Patient treated on outpatient Bactrim prophylactically but continues to have UTI and retention. Source of recurrence can be retention, Velez cath present for several weeks    Plan   -- As listed under CHELA  -- Consult transplant nephrology/urology for recommendations      -- mycophenolate has been d/c'd       -- Continue tacrolimus 5 mg       -- Start NS at 75 cc/hr Pt has a history of recurrent UTI, particularly in the last month as he has had at least 3 episodes. Patient has previously had MDR UTI, pseudomonas UTI sensitive to cefepime. UA positive for UTI as noted above. Patient treated on outpatient Bactrim prophylactically but continues to have UTI and retention. Source of recurrence can be retention, Velez cath present for several weeks    Plan   -- As listed under CHELA      -- Pseudomonas in UCx sensitive to ABx except to Ciprofloxacin and Levofloxacin  -- Consult transplant nephrology/urology for recommendations      -- mycophenolate has been d/c'd       -- Continue tacrolimus 5 mg. Tacrolimus level 4.9      -- Start NS at 75 cc/hr

## 2021-08-22 NOTE — PROGRESS NOTE ADULT - PROBLEM SELECTOR PLAN 1
- Pt has history of CHELA, most recently treated in early August, as well as June. Previously d/t urinary retention, as well as developed Pseudomonal UTI. Patient baseline Cr 2.4, elevated to 2.9 on outpatient blood work, 3.12 at ED. Patient currently afebrile, no leukocytosis, asymptomatic. UA shows positive LES, nitrites, WBC count to 800s. Likely repeat offender of UTI as patient continues to produce urine through Velez, obstruction is less likely.     Plan  -- Right renal kidney U/S - no significant changes from prior U/S/imaging  -- Cefepime was changed to Zosyn per transplant ID recommendations (8/20 ---> )  -- F/u on urine culture and sensitivities - Pt has history of CHELA, most recently treated in early August, as well as June. Previously d/t urinary retention, as well as developed Pseudomonal UTI. Patient baseline Cr 2.4, elevated to 2.9 on outpatient blood work, 3.12 at ED. Patient currently afebrile, no leukocytosis, asymptomatic. UA shows positive LES, nitrites, WBC count to 800s. Likely repeat offender of UTI as patient continues to produce urine through Velez, obstruction is less likely.     Plan  -- Right renal kidney U/S - no significant changes from prior U/S/imaging  -- Cefepime was changed to Zosyn per transplant ID recommendations (8/20 ---> )  -- Pseudomonas in UCx sensitive to ABx except to Ciprofloxacin and Levofloxacin

## 2021-08-22 NOTE — PROGRESS NOTE ADULT - PROBLEM SELECTOR PLAN 7
Pt had RCC s/p b/l nephrectomy 2015, follow by renal transplant in 2018 after HD for three years. Pt currently on Envarsus, cellcept, orgovyx, allopurinol, mycophenolate. Recent tacrolimus level from this admission is 6.2. CHELA is most likely due to UTI as opposed to transplant rejection. Patient has been compliant with his home medications, though states he hasn't been taking his Cellcept as he ran out of the medication.    Plan  -- Continue on immunosuppression treatment  -- Consult transplant nephrology team for any recommendations Pt had RCC s/p b/l nephrectomy 2015, follow by renal transplant in 2018 after HD for three years. Pt currently on Envarsus, cellcept, orgovyx, allopurinol, mycophenolate. Recent tacrolimus level from this admission is 6.2. CHELA is most likely due to UTI as opposed to transplant rejection. Patient has been compliant with his home medications, though states he hasn't been taking his Cellcept as he ran out of the medication.    Plan  -- Continue on immunosuppression treatment  -- Consult Transplant Nephrology team for any recommendations

## 2021-08-23 LAB
25(OH)D3 SERPL-MCNC: 35.2 NG/ML
ALBUMIN SERPL ELPH-MCNC: 3.9 G/DL
ALBUMIN SERPL ELPH-MCNC: 3.9 G/DL — SIGNIFICANT CHANGE UP (ref 3.3–5)
ALP BLD-CCNC: 73 U/L
ALP SERPL-CCNC: 60 U/L — SIGNIFICANT CHANGE UP (ref 40–120)
ALT FLD-CCNC: 10 U/L — SIGNIFICANT CHANGE UP (ref 10–45)
ALT SERPL-CCNC: 16 U/L
ANION GAP SERPL CALC-SCNC: 11 MMOL/L
ANION GAP SERPL CALC-SCNC: 12 MMOL/L — SIGNIFICANT CHANGE UP (ref 5–17)
APPEARANCE: ABNORMAL
AST SERPL-CCNC: 15 U/L — SIGNIFICANT CHANGE UP (ref 10–40)
AST SERPL-CCNC: 18 U/L
BACTERIA: ABNORMAL
BASOPHILS # BLD AUTO: 0.02 K/UL
BASOPHILS # BLD AUTO: 0.02 K/UL — SIGNIFICANT CHANGE UP (ref 0–0.2)
BASOPHILS NFR BLD AUTO: 0.4 %
BASOPHILS NFR BLD AUTO: 0.5 % — SIGNIFICANT CHANGE UP (ref 0–2)
BILIRUB SERPL-MCNC: 0.3 MG/DL
BILIRUB SERPL-MCNC: 0.3 MG/DL — SIGNIFICANT CHANGE UP (ref 0.2–1.2)
BILIRUBIN URINE: NEGATIVE
BKV DNA SPEC QL NAA+PROBE: NEGATIVE COPIES/ML
BLOOD URINE: ABNORMAL
BUN SERPL-MCNC: 26 MG/DL — HIGH (ref 7–23)
BUN SERPL-MCNC: 34 MG/DL
CALCIUM SERPL-MCNC: 10.1 MG/DL — SIGNIFICANT CHANGE UP (ref 8.4–10.5)
CALCIUM SERPL-MCNC: 9.7 MG/DL
CALCIUM SERPL-MCNC: 9.7 MG/DL
CHLORIDE SERPL-SCNC: 102 MMOL/L — SIGNIFICANT CHANGE UP (ref 96–108)
CHLORIDE SERPL-SCNC: 103 MMOL/L
CHOLEST SERPL-MCNC: 128 MG/DL
CMV DNA SPEC QL NAA+PROBE: NOT DETECTED IU/ML
CO2 SERPL-SCNC: 23 MMOL/L
CO2 SERPL-SCNC: 24 MMOL/L — SIGNIFICANT CHANGE UP (ref 22–31)
COLOR: YELLOW
CREAT SERPL-MCNC: 2.92 MG/DL — HIGH (ref 0.5–1.3)
CREAT SERPL-MCNC: 2.99 MG/DL
CREAT SPEC-SCNC: 131 MG/DL
CREAT/PROT UR: 0.9 RATIO
EOSINOPHIL # BLD AUTO: 0.06 K/UL
EOSINOPHIL # BLD AUTO: 0.07 K/UL — SIGNIFICANT CHANGE UP (ref 0–0.5)
EOSINOPHIL NFR BLD AUTO: 1.1 %
EOSINOPHIL NFR BLD AUTO: 1.6 % — SIGNIFICANT CHANGE UP (ref 0–6)
GLUCOSE BLDC GLUCOMTR-MCNC: 134 MG/DL — HIGH (ref 70–99)
GLUCOSE BLDC GLUCOMTR-MCNC: 146 MG/DL — HIGH (ref 70–99)
GLUCOSE BLDC GLUCOMTR-MCNC: 161 MG/DL — HIGH (ref 70–99)
GLUCOSE BLDC GLUCOMTR-MCNC: 164 MG/DL — HIGH (ref 70–99)
GLUCOSE BLDC GLUCOMTR-MCNC: 184 MG/DL — HIGH (ref 70–99)
GLUCOSE QUALITATIVE U: NEGATIVE
GLUCOSE SERPL-MCNC: 133 MG/DL
GLUCOSE SERPL-MCNC: 224 MG/DL — HIGH (ref 70–99)
HCT VFR BLD CALC: 33.7 % — LOW (ref 39–50)
HCT VFR BLD CALC: 33.8 %
HDLC SERPL-MCNC: 67 MG/DL
HGB BLD-MCNC: 10 G/DL
HGB BLD-MCNC: 10 G/DL — LOW (ref 13–17)
HYALINE CASTS: 0 /LPF
IMM GRANULOCYTES NFR BLD AUTO: 0.4 %
IMM GRANULOCYTES NFR BLD AUTO: 0.7 % — SIGNIFICANT CHANGE UP (ref 0–1.5)
KETONES URINE: NEGATIVE
LDH SERPL-CCNC: 160 U/L
LDLC SERPL CALC-MCNC: 17 MG/DL
LEUKOCYTE ESTERASE URINE: ABNORMAL
LOG 10 BK QUANTITATION PCR: NORMAL
LYMPHOCYTES # BLD AUTO: 1.49 K/UL — SIGNIFICANT CHANGE UP (ref 1–3.3)
LYMPHOCYTES # BLD AUTO: 2.11 K/UL
LYMPHOCYTES # BLD AUTO: 34.2 % — SIGNIFICANT CHANGE UP (ref 13–44)
LYMPHOCYTES NFR BLD AUTO: 37.7 %
MAGNESIUM SERPL-MCNC: 1.7 MG/DL
MAGNESIUM SERPL-MCNC: 1.9 MG/DL — SIGNIFICANT CHANGE UP (ref 1.6–2.6)
MAN DIFF?: NORMAL
MCHC RBC-ENTMCNC: 29.3 PG — SIGNIFICANT CHANGE UP (ref 27–34)
MCHC RBC-ENTMCNC: 29.6 GM/DL
MCHC RBC-ENTMCNC: 29.7 GM/DL — LOW (ref 32–36)
MCHC RBC-ENTMCNC: 30.1 PG
MCV RBC AUTO: 101.8 FL
MCV RBC AUTO: 98.8 FL — SIGNIFICANT CHANGE UP (ref 80–100)
MICROSCOPIC-UA: NORMAL
MONOCYTES # BLD AUTO: 0.28 K/UL
MONOCYTES # BLD AUTO: 0.39 K/UL — SIGNIFICANT CHANGE UP (ref 0–0.9)
MONOCYTES NFR BLD AUTO: 5 %
MONOCYTES NFR BLD AUTO: 8.9 % — SIGNIFICANT CHANGE UP (ref 2–14)
NEUTROPHILS # BLD AUTO: 2.36 K/UL — SIGNIFICANT CHANGE UP (ref 1.8–7.4)
NEUTROPHILS # BLD AUTO: 3.1 K/UL
NEUTROPHILS NFR BLD AUTO: 54.1 % — SIGNIFICANT CHANGE UP (ref 43–77)
NEUTROPHILS NFR BLD AUTO: 55.4 %
NITRITE URINE: NEGATIVE
NONHDLC SERPL-MCNC: 62 MG/DL
NRBC # BLD: 0 /100 WBCS — SIGNIFICANT CHANGE UP (ref 0–0)
PARATHYROID HORMONE INTACT: 71 PG/ML
PH URINE: 6
PHOSPHATE SERPL-MCNC: 3.3 MG/DL — SIGNIFICANT CHANGE UP (ref 2.5–4.5)
PHOSPHATE SERPL-MCNC: 3.5 MG/DL
PLATELET # BLD AUTO: 159 K/UL — SIGNIFICANT CHANGE UP (ref 150–400)
PLATELET # BLD AUTO: 163 K/UL
POTASSIUM SERPL-MCNC: 4.4 MMOL/L — SIGNIFICANT CHANGE UP (ref 3.5–5.3)
POTASSIUM SERPL-SCNC: 4.4 MMOL/L — SIGNIFICANT CHANGE UP (ref 3.5–5.3)
POTASSIUM SERPL-SCNC: 5.8 MMOL/L
PROT SERPL-MCNC: 6.3 G/DL
PROT SERPL-MCNC: 6.6 G/DL — SIGNIFICANT CHANGE UP (ref 6–8.3)
PROT UR-MCNC: 111 MG/DL
PROTEIN URINE: ABNORMAL
RBC # BLD: 3.32 M/UL
RBC # BLD: 3.41 M/UL — LOW (ref 4.2–5.8)
RBC # FLD: 16.6 %
RBC # FLD: 17 % — HIGH (ref 10.3–14.5)
RED BLOOD CELLS URINE: 39 /HPF
SODIUM SERPL-SCNC: 137 MMOL/L
SODIUM SERPL-SCNC: 138 MMOL/L — SIGNIFICANT CHANGE UP (ref 135–145)
SPECIFIC GRAVITY URINE: 1.02
SQUAMOUS EPITHELIAL CELLS: 0 /HPF
TACROLIMUS SERPL-MCNC: 4.6 NG/ML
TACROLIMUS SERPL-MCNC: 5.2 NG/ML — SIGNIFICANT CHANGE UP
TRIGL SERPL-MCNC: 223 MG/DL
URATE SERPL-MCNC: 7.7 MG/DL
UROBILINOGEN URINE: NORMAL
WBC # BLD: 4.36 K/UL — SIGNIFICANT CHANGE UP (ref 3.8–10.5)
WBC # FLD AUTO: 4.36 K/UL — SIGNIFICANT CHANGE UP (ref 3.8–10.5)
WBC # FLD AUTO: 5.59 K/UL
WHITE BLOOD CELLS URINE: 209 /HPF

## 2021-08-23 PROCEDURE — 99232 SBSQ HOSP IP/OBS MODERATE 35: CPT | Mod: GC

## 2021-08-23 PROCEDURE — 99233 SBSQ HOSP IP/OBS HIGH 50: CPT | Mod: GC

## 2021-08-23 PROCEDURE — 99232 SBSQ HOSP IP/OBS MODERATE 35: CPT

## 2021-08-23 RX ORDER — TACROLIMUS 5 MG/1
5 CAPSULE ORAL
Qty: 150 | Refills: 0
Start: 2021-08-23 | End: 2021-09-21

## 2021-08-23 RX ORDER — SODIUM CHLORIDE 9 MG/ML
1000 INJECTION INTRAMUSCULAR; INTRAVENOUS; SUBCUTANEOUS
Refills: 0 | Status: DISCONTINUED | OUTPATIENT
Start: 2021-08-23 | End: 2021-08-24

## 2021-08-23 RX ADMIN — HEPARIN SODIUM 5000 UNIT(S): 5000 INJECTION INTRAVENOUS; SUBCUTANEOUS at 15:22

## 2021-08-23 RX ADMIN — Medication 15 MILLILITER(S): at 15:21

## 2021-08-23 RX ADMIN — FINASTERIDE 5 MILLIGRAM(S): 5 TABLET, FILM COATED ORAL at 12:50

## 2021-08-23 RX ADMIN — PIPERACILLIN AND TAZOBACTAM 25 GRAM(S): 4; .5 INJECTION, POWDER, LYOPHILIZED, FOR SOLUTION INTRAVENOUS at 00:35

## 2021-08-23 RX ADMIN — Medication 1: at 12:49

## 2021-08-23 RX ADMIN — Medication 100 MILLIGRAM(S): at 06:36

## 2021-08-23 RX ADMIN — Medication 60 MILLIGRAM(S): at 06:36

## 2021-08-23 RX ADMIN — HEPARIN SODIUM 5000 UNIT(S): 5000 INJECTION INTRAVENOUS; SUBCUTANEOUS at 06:35

## 2021-08-23 RX ADMIN — Medication 5 MILLIGRAM(S): at 06:36

## 2021-08-23 RX ADMIN — Medication 1 TABLET(S): at 12:50

## 2021-08-23 RX ADMIN — Medication 1 TABLET(S): at 06:36

## 2021-08-23 RX ADMIN — TACROLIMUS 5 MILLIGRAM(S): 5 CAPSULE ORAL at 06:55

## 2021-08-23 RX ADMIN — TAMSULOSIN HYDROCHLORIDE 0.8 MILLIGRAM(S): 0.4 CAPSULE ORAL at 21:49

## 2021-08-23 RX ADMIN — Medication 15 MILLILITER(S): at 22:57

## 2021-08-23 RX ADMIN — PIPERACILLIN AND TAZOBACTAM 25 GRAM(S): 4; .5 INJECTION, POWDER, LYOPHILIZED, FOR SOLUTION INTRAVENOUS at 09:04

## 2021-08-23 RX ADMIN — Medication 1 TABLET(S): at 15:22

## 2021-08-23 RX ADMIN — Medication 1 TABLET(S): at 21:48

## 2021-08-23 RX ADMIN — HEPARIN SODIUM 5000 UNIT(S): 5000 INJECTION INTRAVENOUS; SUBCUTANEOUS at 21:49

## 2021-08-23 RX ADMIN — SODIUM CHLORIDE 50 MILLILITER(S): 9 INJECTION INTRAMUSCULAR; INTRAVENOUS; SUBCUTANEOUS at 15:22

## 2021-08-23 RX ADMIN — Medication 100 MILLIGRAM(S): at 12:50

## 2021-08-23 RX ADMIN — PIPERACILLIN AND TAZOBACTAM 25 GRAM(S): 4; .5 INJECTION, POWDER, LYOPHILIZED, FOR SOLUTION INTRAVENOUS at 15:23

## 2021-08-23 RX ADMIN — Medication 15 MILLILITER(S): at 06:35

## 2021-08-23 NOTE — PROGRESS NOTE ADULT - ASSESSMENT
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    1.s/p DDRT in 2018, Allograft function with baseline Cr ~ 2.5- Now with CHELA ,Cr upto 3.12 likely in the setting of UTI  Last Scr mildly trended up to 2.79 today   UA shows WBC count of 888, RBC 44, protein 300, LES large, nitrite positive.  Blood cx no growth, urine Cx with pseudomonas, currently on Zosyn   Kidney duplex done on this admission with mild fullness of the transplant collecting system and urothelial thickening, similar in appearance to the prior study dated 8/1/2021. No evidence of a significant renal artery stenosis. Elevated resistive indices are again noted, similar to prior study.    2. IS meds- On Envarsus 5 mg po daily, Check Tac level in am ~ 30 mins PRIOR to morning dose. Hold MMF and continue Prednisone 5 mg po daily. Last tacro level is at goal 5.2   3. UTI with h/o recurrent pseudomonas UTI in the past.  UC from 8/1 in clinic grew > 100K Pseudomonas. On Zosyn.     If any questions, please feel free to contact me     Nisl Snow  Nephrology Fellow  [facility] Pager: [phone]  [facility] Pager: [phone]

## 2021-08-23 NOTE — PROGRESS NOTE ADULT - PROBLEM SELECTOR PLAN 7
Pt had RCC s/p b/l nephrectomy 2015, follow by renal transplant in 2018 after HD for three years. Pt currently on Envarsus, cellcept, orgovyx, allopurinol, mycophenolate. Recent tacrolimus level from this admission is 6.2. CHELA is most likely due to UTI as opposed to transplant rejection. Patient has been compliant with his home medications, though states he hasn't been taking his Cellcept as he ran out of the medication.    Plan  -- Continue on immunosuppression treatment  -- Consult Transplant Nephrology team for any recommendations Pt had RCC s/p b/l nephrectomy 2015, follow by renal transplant in 2018 after HD for three years. Pt currently on Envarsus, cellcept, orgovyx, allopurinol, mycophenolate. Recent tacrolimus level from this admission is 6.2. CHELA is most likely due to UTI as opposed to transplant rejection. Patient has been compliant with his home medications, though states he hasn't been taking his Cellcept as he ran out of the medication.    Plan  -- Continue on immunosuppression treatment sans MMF/steroid  -- Continue checking tacrolimus levels in the AM 30 minutes prior to med administration  -- Consult Transplant Nephrology team for any recommendations  -- Communicate about continuing MMF/steroid on d/c

## 2021-08-23 NOTE — PROGRESS NOTE ADULT - ASSESSMENT
Mr. Damir Medrano is a 69 y/o male w/ PMHx of RCC s/p b/l nephrectomy, renal transplant on immunosuppression s/p ureteral stent, current prostate CA on radiation therapy, recurrent Pseudomonal UTIs, BPH, DM, HTN, recent failed voiding, who presents to ED with elevated Cr on outpatient labs w/ UA at ED positive for WBC, leukocyte esterase, Nitrites, otherwise asymptomatic likely CHELA 2/2 recurrent UTI currently on antibiotics.

## 2021-08-23 NOTE — PROGRESS NOTE ADULT - PROBLEM SELECTOR PLAN 2
Pt has a history of recurrent UTI, particularly in the last month as he has had at least 3 episodes. Patient has previously had MDR UTI, pseudomonas UTI sensitive to cefepime. UA positive for UTI as noted above. Patient treated on outpatient Bactrim prophylactically but continues to have UTI and retention. Source of recurrence can be retention, Velez cath present for several weeks    Plan   -- As listed under CHELA      -- Pseudomonas in UCx sensitive to ABx except to Ciprofloxacin and Levofloxacin  -- Consult transplant nephrology/urology for recommendations      -- mycophenolate has been d/c'd       -- Continue tacrolimus 5 mg. Tacrolimus level 4.9      -- Start NS at 75 cc/hr Pt has a history of recurrent UTI, particularly in the last month as he has had at least 3 episodes. Patient has previously had MDR UTI, pseudomonas UTI sensitive to cefepime. UA positive for UTI as noted above. Patient treated on outpatient Bactrim prophylactically but continues to have UTI and retention. Source of recurrence can be retention, Velez cath present for several weeks    Plan   -- As listed under CHELA      -- Pseudomonas in UCx sensitive to ABx except to Ciprofloxacin and Levofloxacin  -- Consult transplant nephrology/urology for recommendations      -- mycophenolate has been d/c'd       -- Continue tacrolimus 5 mg. Tacrolimus level from 8/23 pending      -- C/w NS at 75 cc/hr      -- Discuss w/ transplant about MMF and steroid continuation on d/c Pt has a history of recurrent UTI, particularly in the last month as he has had at least 3 episodes. Patient has previously had MDR UTI, pseudomonas UTI sensitive to cefepime. UA positive for UTI as noted above. Patient treated on outpatient Bactrim prophylactically but continues to have UTI and retention. Source of recurrence can be retention, Velez cath present for several weeks    Plan   -- As listed under CHELA      -- Pseudomonas in UCx sensitive to ABx except to Ciprofloxacin and Levofloxacin  -- Consult transplant nephrology/urology for recommendations      -- mycophenolate has been d/c'd       -- Continue tacrolimus 5 mg. Tacrolimus level from 8/23 pending      -- C/w NS at 75 cc/hr      -- c/w prednisone 5 qdaily      -- Per transplant nephro pt can c/w MMF once d/c'd and finish Abx course

## 2021-08-23 NOTE — PROGRESS NOTE ADULT - SUBJECTIVE AND OBJECTIVE BOX
[facility] DIVISION OF KIDNEY DISEASES AND HYPERTENSION -- FOLLOW UP NOTE  --------------------------------------------------------------------------------  Chief Complaint:    24 hour events/subjective:        PAST HISTORY  --------------------------------------------------------------------------------  No significant changes to PMH, PSH, FHx, SHx, unless otherwise noted    ALLERGIES & MEDICATIONS  --------------------------------------------------------------------------------  Allergies    No Known Allergies    Intolerances      Standing Inpatient Medications  allopurinol 100 milliGRAM(s) Oral daily  calcium carbonate    500 mG (Tums) Chewable 1 Tablet(s) Chew three times a day  citric acid/sodium citrate Solution 15 milliLiter(s) Oral three times a day  dextrose 40% Gel 15 Gram(s) Oral once  dextrose 5%. 1000 milliLiter(s) IV Continuous <Continuous>  dextrose 5%. 1000 milliLiter(s) IV Continuous <Continuous>  dextrose 50% Injectable 25 Gram(s) IV Push once  dextrose 50% Injectable 12.5 Gram(s) IV Push once  dextrose 50% Injectable 25 Gram(s) IV Push once  finasteride 5 milliGRAM(s) Oral daily  glucagon  Injectable 1 milliGRAM(s) IntraMuscular once  heparin   Injectable 5000 Unit(s) SubCutaneous every 8 hours  insulin lispro (ADMELOG) corrective regimen sliding scale   SubCutaneous three times a day before meals  insulin lispro (ADMELOG) corrective regimen sliding scale   SubCutaneous at bedtime  metoprolol succinate  milliGRAM(s) Oral daily  NIFEdipine XL 60 milliGRAM(s) Oral daily  piperacillin/tazobactam IVPB.. 3.375 Gram(s) IV Intermittent every 8 hours  predniSONE   Tablet 5 milliGRAM(s) Oral daily  tacrolimus ER Tablet (ENVARSUS XR) 5 milliGRAM(s) Oral daily  tamsulosin 0.8 milliGRAM(s) Oral at bedtime  trimethoprim   80 mG/sulfamethoxazole 400 mG 1 Tablet(s) Oral daily    PRN Inpatient Medications  polyethylene glycol 3350 17 Gram(s) Oral daily PRN      REVIEW OF SYSTEMS  --------------------------------------------------------------------------------  Gen: No fevers/chills  Respiratory: No dyspnea, cough,   CV: No chest pain, PND, orthopnea  GI: No abdominal pain, diarrhea, constipation, nausea, vomiting  Transplant: No pain  : No increased frequency, dysuria, hematuria   MSK: No edema  Neuro: No dizziness/lightheadedness    All other systems were reviewed and are negative, except as noted.    VITALS/PHYSICAL EXAM  --------------------------------------------------------------------------------  T(C): 36.3 (08-23-21 @ 05:05), Max: 36.7 (08-22-21 @ 14:55)  HR: 67 (08-23-21 @ 06:34) (64 - 68)  BP: 174/76 (08-23-21 @ 06:34) (116/69 - 174/76)  RR: 18 (08-23-21 @ 05:05) (16 - 18)  SpO2: 98% (08-23-21 @ 05:05) (98% - 98%)  Wt(kg): --        08-22-21 @ 07:01  -  08-23-21 @ 07:00  --------------------------------------------------------  IN: 1080 mL / OUT: 1200 mL / NET: -120 mL      Physical Exam:  	Gen: NAD, able to speak in full sentences   	HEENT: PERRL, MMM   	Pulm: CTA B/L, no crackles   	CV: RRR, S1S2+  	Abd: +BS, soft          Transplant: No tenderness, swelling  	: No suprapubic tenderness  	MSK: no edema   	Psych: Normal affect and mood  	Skin: Warm    LABS/STUDIES  --------------------------------------------------------------------------------              10.0   4.36  >-----------<  159      [08-23-21 @ 10:12]              33.7     139  |  101  |  28  ----------------------------<  132      [08-22-21 @ 07:04]  4.8   |  23  |  2.79        Ca     10.3     [08-22-21 @ 07:04]      Mg     1.9     [08-22-21 @ 07:04]      Phos  3.3     [08-22-21 @ 07:04]    TPro  6.5  /  Alb  3.7  /  TBili  0.3  /  DBili  x   /  AST  20  /  ALT  11  /  AlkPhos  62  [08-22-21 @ 07:04]          Creatinine Trend:  SCr 2.79 [08-22 @ 07:04]  SCr 2.56 [08-21 @ 07:48]  SCr 3.12 [08-20 @ 02:28]  SCr 2.59 [08-07 @ 07:18]  SCr 2.84 [08-06 @ 05:30]    Tacrolimus (), Serum: 5.2 ng/mL (08-23 @ 08:04)  Tacrolimus (), Serum: 4.9 ng/mL (08-22 @ 08:11)  Tacrolimus (), Serum: 5.0 ng/mL (08-21 @ 09:45)  Tacrolimus (), Serum: 6.2 ng/mL (08-20 @ 04:19)            Urinalysis - [08-20-21 @ 04:00]      Color Light Orange / Appearance Turbid / SG 1.017 / pH 6.5      Gluc Negative / Ketone Negative  / Bili Negative / Urobili Negative       Blood Moderate / Protein 300 mg/dL / Leuk Est Large / Nitrite Positive      RBC 44 /  / Hyaline 1 / Gran  / Sq Epi  / Non Sq Epi 0 / Bacteria Negative      Iron 75, TIBC 278, %sat 27      [06-02-21 @ 16:51]  Ferritin 450      [06-02-21 @ 17:06]  PTH -- (Ca 10.3)      [06-04-21 @ 09:37]   22  PTH -- (Ca --)      [06-02-21 @ 17:06]   15  Vitamin D (25OH) 37.0      [06-04-21 @ 09:37]  HbA1c 6.0      [08-09-19 @ 23:48]         [facility] DIVISION OF KIDNEY DISEASES AND HYPERTENSION -- FOLLOW UP NOTE  --------------------------------------------------------------------------------  Chief Complaint:  UTI CHELA    24 hour events/subjective:  Pt feels ok      PAST HISTORY  --------------------------------------------------------------------------------  No significant changes to PMH, PSH, FHx, SHx, unless otherwise noted    ALLERGIES & MEDICATIONS  --------------------------------------------------------------------------------  Allergies    No Known Allergies    Intolerances      Standing Inpatient Medications  allopurinol 100 milliGRAM(s) Oral daily  calcium carbonate    500 mG (Tums) Chewable 1 Tablet(s) Chew three times a day  citric acid/sodium citrate Solution 15 milliLiter(s) Oral three times a day  dextrose 40% Gel 15 Gram(s) Oral once  dextrose 5%. 1000 milliLiter(s) IV Continuous <Continuous>  dextrose 5%. 1000 milliLiter(s) IV Continuous <Continuous>  dextrose 50% Injectable 25 Gram(s) IV Push once  dextrose 50% Injectable 12.5 Gram(s) IV Push once  dextrose 50% Injectable 25 Gram(s) IV Push once  finasteride 5 milliGRAM(s) Oral daily  glucagon  Injectable 1 milliGRAM(s) IntraMuscular once  heparin   Injectable 5000 Unit(s) SubCutaneous every 8 hours  insulin lispro (ADMELOG) corrective regimen sliding scale   SubCutaneous three times a day before meals  insulin lispro (ADMELOG) corrective regimen sliding scale   SubCutaneous at bedtime  metoprolol succinate  milliGRAM(s) Oral daily  NIFEdipine XL 60 milliGRAM(s) Oral daily  piperacillin/tazobactam IVPB.. 3.375 Gram(s) IV Intermittent every 8 hours  predniSONE   Tablet 5 milliGRAM(s) Oral daily  tacrolimus ER Tablet (ENVARSUS XR) 5 milliGRAM(s) Oral daily  tamsulosin 0.8 milliGRAM(s) Oral at bedtime  trimethoprim   80 mG/sulfamethoxazole 400 mG 1 Tablet(s) Oral daily    PRN Inpatient Medications  polyethylene glycol 3350 17 Gram(s) Oral daily PRN      REVIEW OF SYSTEMS  --------------------------------------------------------------------------------  Gen: No fevers/chills  Respiratory: No dyspnea, cough,   CV: No chest pain, PND, orthopnea  GI: No abdominal pain, diarrhea, constipation, nausea, vomiting  Transplant: No pain  : No increased frequency, dysuria, hematuria   MSK: No edema  Neuro: No dizziness/lightheadedness    All other systems were reviewed and are negative, except as noted.    VITALS/PHYSICAL EXAM  --------------------------------------------------------------------------------  T(C): 36.3 (08-23-21 @ 05:05), Max: 36.7 (08-22-21 @ 14:55)  HR: 67 (08-23-21 @ 06:34) (64 - 68)  BP: 174/76 (08-23-21 @ 06:34) (116/69 - 174/76)  RR: 18 (08-23-21 @ 05:05) (16 - 18)  SpO2: 98% (08-23-21 @ 05:05) (98% - 98%)  Wt(kg): --        08-22-21 @ 07:01  -  08-23-21 @ 07:00  --------------------------------------------------------  IN: 1080 mL / OUT: 1200 mL / NET: -120 mL      Physical Exam:  	Gen: NAD, able to speak in full sentences   	HEENT: PERRL, MMM   	Pulm: CTA B/L, no crackles   	CV: RRR, S1S2+  	Abd: +BS, soft          Transplant: No tenderness, swelling  	: No suprapubic tenderness  	MSK: no edema   	Psych: Normal affect and mood  	Skin: Warm    LABS/STUDIES  --------------------------------------------------------------------------------              10.0   4.36  >-----------<  159      [08-23-21 @ 10:12]              33.7     139  |  101  |  28  ----------------------------<  132      [08-22-21 @ 07:04]  4.8   |  23  |  2.79        Ca     10.3     [08-22-21 @ 07:04]      Mg     1.9     [08-22-21 @ 07:04]      Phos  3.3     [08-22-21 @ 07:04]    TPro  6.5  /  Alb  3.7  /  TBili  0.3  /  DBili  x   /  AST  20  /  ALT  11  /  AlkPhos  62  [08-22-21 @ 07:04]          Creatinine Trend:  SCr 2.79 [08-22 @ 07:04]  SCr 2.56 [08-21 @ 07:48]  SCr 3.12 [08-20 @ 02:28]  SCr 2.59 [08-07 @ 07:18]  SCr 2.84 [08-06 @ 05:30]    Tacrolimus (), Serum: 5.2 ng/mL (08-23 @ 08:04)  Tacrolimus (), Serum: 4.9 ng/mL (08-22 @ 08:11)  Tacrolimus (), Serum: 5.0 ng/mL (08-21 @ 09:45)  Tacrolimus (), Serum: 6.2 ng/mL (08-20 @ 04:19)            Urinalysis - [08-20-21 @ 04:00]      Color Light Orange / Appearance Turbid / SG 1.017 / pH 6.5      Gluc Negative / Ketone Negative  / Bili Negative / Urobili Negative       Blood Moderate / Protein 300 mg/dL / Leuk Est Large / Nitrite Positive      RBC 44 /  / Hyaline 1 / Gran  / Sq Epi  / Non Sq Epi 0 / Bacteria Negative      Iron 75, TIBC 278, %sat 27      [06-02-21 @ 16:51]  Ferritin 450      [06-02-21 @ 17:06]  PTH -- (Ca 10.3)      [06-04-21 @ 09:37]   22  PTH -- (Ca --)      [06-02-21 @ 17:06]   15  Vitamin D (25OH) 37.0      [06-04-21 @ 09:37]  HbA1c 6.0      [08-09-19 @ 23:48]

## 2021-08-23 NOTE — PROGRESS NOTE ADULT - PROBLEM SELECTOR PLAN 1
- Pt has history of CHELA, most recently treated in early August, as well as June. Previously d/t urinary retention, as well as developed Pseudomonal UTI. Patient baseline Cr 2.4, elevated to 2.9 on outpatient blood work, 3.12 at ED. Patient currently afebrile, no leukocytosis, asymptomatic. UA shows positive LES, nitrites, WBC count to 800s. Likely repeat offender of UTI as patient continues to produce urine through Velez, obstruction is less likely.     Plan  -- Right renal kidney U/S - no significant changes from prior U/S/imaging  -- Cefepime was changed to Zosyn per transplant ID recommendations (8/20 ---> )  -- Pseudomonas in UCx sensitive to ABx except to Ciprofloxacin and Levofloxacin - Pt has history of CHELA, most recently treated in early August, as well as June. Previously d/t urinary retention, as well as developed Pseudomonal UTI. Patient baseline Cr 2.4, elevated to 2.9 on outpatient blood work, 3.12 at ED. Patient currently afebrile, no leukocytosis, asymptomatic. UA shows positive LES, nitrites, WBC count to 800s. Likely repeat offender of UTI as patient continues to produce urine through Velez, obstruction is less likely.     Plan  -- Right renal kidney U/S - no significant changes from prior U/S/imaging  -- c/w Zosyn per transplant ID recommendations (8/20 ---> )  -- Pseudomonas in UCx sensitive to ABx except to Ciprofloxacin and Levofloxacin  -- F/u ID recommendations for Abx treatment

## 2021-08-23 NOTE — PROGRESS NOTE ADULT - PROBLEM SELECTOR PLAN 3
Pt has documented history of diabetes mellitus. Pt takes Januvia 25, glimeperide 2 mg. Last A1C documented is 6.6 from 8/3/21. Current glucose level 151 per BMP.    Plan  -- Insulin SS

## 2021-08-23 NOTE — PROGRESS NOTE ADULT - PROBLEM SELECTOR PLAN 6
Pt has documented history of hypertension. Currently patient is on atenolol 25 mg qdaily, metoprolol 100 qdaily, nifedipine 30 mg BID. Pt systolic BP at 158 in ED, otherwise has been hemodynamically stable.    Plan  -- Continue home medications during this admission  -- Monitor BP and adjust as necessary Pt has documented history of hypertension. Currently patient is on atenolol 25 mg qdaily, metoprolol 100 qdaily, nifedipine 30 mg BID. Pt systolic BP at 158 in ED, otherwise has been hemodynamically stable.    Plan  -- Continue home medications during this admission  -- Monitor BP and adjust as necessary     -- blood pressure elevated to 174 8/23; given medications continue to monitor

## 2021-08-23 NOTE — PROGRESS NOTE ADULT - ASSESSMENT
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 He had a recent admission for pseudomonal UTI also a/w CHELA due to urinary retention. He was admitted for chronic urinary retention and UTI. ID was consulted for antibiotic recommendations.    UCx (8/20) with CR Pseudomonas   No PO options for treatment    Appears that CHELA secondary to UTI/Pyelo is less likely at this point  BUN/Cr has been waxing and waning despite appropriate antimicrobial therapy  Likely patient's bladder or ureteral stent is colonized with Pseudomonas    I would limit patient to 5 day course of antibiotics (today is Day 4)  Would continue Zosyn for now  No ID objections to discharge following completion of antibiotics    Overall, Abnormal Urinalysis, Positive UCx, CHELA, Renal Transplant Recipient    I will follow the patient as needed. Please feel free to contact me with any further questions or concerns.    Zackery Hall M.D.  [facility] Division of Infectious Disease  8AM-5PM: Pager Number [phone]  After Hours (or if no response): Please contact the Infectious Diseases Office at [phone]     The above assessment and plan were discussed with medicine resident and Dr Arian Bhakta (transplant nephro)

## 2021-08-23 NOTE — PROGRESS NOTE ADULT - SUBJECTIVE AND OBJECTIVE BOX
Gary Kaur MD  PGY-1 Internal Medicine  Pager:  [phone]  Available on [employer] Teams  08-23-21 @ 09:56  _________________________________________________________________________________________________________________________________________    CC:      Patient is a 70y old  Male who presents with a chief complaint of Elevated outpatient Cr level (8/17) (23 Aug 2021 07:07)        SUBJECTIVE:    Pt complains of constipation and feeling like he need to stool but only has flatulence. Pain w/ bowel movements has improved slightly. Denies any f/c, n/v/d, no SOB, no chest pain. A little discomfort when he goes to urinate, but will stand and pain will dissipate. No edema, no headaches.    _________________________________________________________________________________________________________________________________________    OBJECTIVE:    Vital Signs Last 24 Hrs  T(C): 36.3 (23 Aug 2021 05:05), Max: 36.7 (22 Aug 2021 14:55)  T(F): 97.4 (23 Aug 2021 05:05), Max: 98 (22 Aug 2021 14:55)  HR: 67 (23 Aug 2021 06:34) (64 - 68)  BP: 174/76 (23 Aug 2021 06:34) (116/69 - 174/76)  BP(mean): --  RR: 18 (23 Aug 2021 05:05) (16 - 18)  SpO2: 98% (23 Aug 2021 05:05) (98% - 98%)    I&O's Summary    22 Aug 2021 07:01  -  23 Aug 2021 07:00  --------------------------------------------------------  IN: 1080 mL / OUT: 1200 mL / NET: -120 mL        MEDICATIONS  (STANDING):  allopurinol 100 milliGRAM(s) Oral daily  calcium carbonate    500 mG (Tums) Chewable 1 Tablet(s) Chew three times a day  citric acid/sodium citrate Solution 15 milliLiter(s) Oral three times a day  dextrose 40% Gel 15 Gram(s) Oral once  dextrose 5%. 1000 milliLiter(s) (50 mL/Hr) IV Continuous <Continuous>  dextrose 5%. 1000 milliLiter(s) (100 mL/Hr) IV Continuous <Continuous>  dextrose 50% Injectable 25 Gram(s) IV Push once  dextrose 50% Injectable 12.5 Gram(s) IV Push once  dextrose 50% Injectable 25 Gram(s) IV Push once  finasteride 5 milliGRAM(s) Oral daily  glucagon  Injectable 1 milliGRAM(s) IntraMuscular once  heparin   Injectable 5000 Unit(s) SubCutaneous every 8 hours  insulin lispro (ADMELOG) corrective regimen sliding scale   SubCutaneous three times a day before meals  insulin lispro (ADMELOG) corrective regimen sliding scale   SubCutaneous at bedtime  metoprolol succinate  milliGRAM(s) Oral daily  NIFEdipine XL 60 milliGRAM(s) Oral daily  piperacillin/tazobactam IVPB.. 3.375 Gram(s) IV Intermittent every 8 hours  predniSONE   Tablet 5 milliGRAM(s) Oral daily  tacrolimus ER Tablet (ENVARSUS XR) 5 milliGRAM(s) Oral daily  tamsulosin 0.8 milliGRAM(s) Oral at bedtime  trimethoprim   80 mG/sulfamethoxazole 400 mG 1 Tablet(s) Oral daily    MEDICATIONS  (PRN):  polyethylene glycol 3350 17 Gram(s) Oral daily PRN Constipation        PHYSICAL EXAM:    GENERAL: Laying comfortably, NAD  EYES: EOMI, no scleral icterus  NECK: No JVD  LUNG: Clear to auscultation bilaterally; No wheeze, crackles or rhonchi  HEART: Regular rate and rhythm; No murmurs, rubs, or gallops  ABDOMEN: Soft, Nontender, Nondistended, +BS, Velez bag intact, no signs of superficial infection, urine normal  EXTREMITIES:  No LE edema, 2+ Peripheral Pulses, No clubbing, cyanosis, or edema, prominent veins on legs  PSYCH: AAOx3  NEUROLOGY: non-focal, strength 5/5 in all extremities, sensation intact  SKIN: No rashes or lesions      LABS:                            10.5   3.99  )-----------( Clumped    ( 22 Aug 2021 07:17 )             34.5     Auto Eosinophil # x     / Auto Eosinophil % x     / Auto Neutrophil # x     / Auto Neutrophil % x     / BANDS % x                            11.1   5.38  )-----------( 146      ( 21 Aug 2021 17:24 )             36.4     Auto Eosinophil # 0.05  / Auto Eosinophil % 0.9   / Auto Neutrophil # 2.85  / Auto Neutrophil % 53.0  / BANDS % x        08-22    139  |  101  |  28<H>  ----------------------------<  132<H>  4.8   |  23  |  2.79<H>    Ca    10.3      22 Aug 2021 07:04  Mg     1.9     08-22  Phos  3.3     08-22  TPro  6.5  /  Alb  3.7  /  TBili  0.3  /  DBili  x   /  AST  20  /  ALT  11  /  AlkPhos  62  08-22      _________________________________________________________________________________________________________________________________________  Gary Kaur MD  PGY-1 Internal Medicine  Pager: [phone]  Available on [employer] Teams  23 Aug 2021 09:56

## 2021-08-24 ENCOUNTER — TRANSCRIPTION ENCOUNTER (OUTPATIENT)
Age: 71
End: 2021-08-24

## 2021-08-24 VITALS
SYSTOLIC BLOOD PRESSURE: 119 MMHG | RESPIRATION RATE: 19 BRPM | OXYGEN SATURATION: 97 % | DIASTOLIC BLOOD PRESSURE: 68 MMHG | TEMPERATURE: 98 F | HEART RATE: 64 BPM

## 2021-08-24 LAB
ALBUMIN SERPL ELPH-MCNC: 3.6 G/DL — SIGNIFICANT CHANGE UP (ref 3.3–5)
ALP SERPL-CCNC: 68 U/L — SIGNIFICANT CHANGE UP (ref 40–120)
ALT FLD-CCNC: 12 U/L — SIGNIFICANT CHANGE UP (ref 10–45)
ANION GAP SERPL CALC-SCNC: 14 MMOL/L — SIGNIFICANT CHANGE UP (ref 5–17)
AST SERPL-CCNC: 20 U/L — SIGNIFICANT CHANGE UP (ref 10–40)
BASOPHILS # BLD AUTO: 0.02 K/UL — SIGNIFICANT CHANGE UP (ref 0–0.2)
BASOPHILS NFR BLD AUTO: 0.4 % — SIGNIFICANT CHANGE UP (ref 0–2)
BILIRUB SERPL-MCNC: 0.2 MG/DL — SIGNIFICANT CHANGE UP (ref 0.2–1.2)
BUN SERPL-MCNC: 25 MG/DL — HIGH (ref 7–23)
CALCIUM SERPL-MCNC: 10.1 MG/DL — SIGNIFICANT CHANGE UP (ref 8.4–10.5)
CHLORIDE SERPL-SCNC: 105 MMOL/L — SIGNIFICANT CHANGE UP (ref 96–108)
CO2 SERPL-SCNC: 20 MMOL/L — LOW (ref 22–31)
CREAT SERPL-MCNC: 2.77 MG/DL — HIGH (ref 0.5–1.3)
CULTURE RESULTS: SIGNIFICANT CHANGE UP
EOSINOPHIL # BLD AUTO: 0.06 K/UL — SIGNIFICANT CHANGE UP (ref 0–0.5)
EOSINOPHIL NFR BLD AUTO: 1.3 % — SIGNIFICANT CHANGE UP (ref 0–6)
GLUCOSE BLDC GLUCOMTR-MCNC: 135 MG/DL — HIGH (ref 70–99)
GLUCOSE BLDC GLUCOMTR-MCNC: 156 MG/DL — HIGH (ref 70–99)
GLUCOSE BLDC GLUCOMTR-MCNC: 216 MG/DL — HIGH (ref 70–99)
GLUCOSE SERPL-MCNC: 132 MG/DL — HIGH (ref 70–99)
HCT VFR BLD CALC: 34.8 % — LOW (ref 39–50)
HGB BLD-MCNC: 10.6 G/DL — LOW (ref 13–17)
IMM GRANULOCYTES NFR BLD AUTO: 0.9 % — SIGNIFICANT CHANGE UP (ref 0–1.5)
LYMPHOCYTES # BLD AUTO: 1.77 K/UL — SIGNIFICANT CHANGE UP (ref 1–3.3)
LYMPHOCYTES # BLD AUTO: 39.4 % — SIGNIFICANT CHANGE UP (ref 13–44)
MAGNESIUM SERPL-MCNC: 1.7 MG/DL — SIGNIFICANT CHANGE UP (ref 1.6–2.6)
MCHC RBC-ENTMCNC: 30 PG — SIGNIFICANT CHANGE UP (ref 27–34)
MCHC RBC-ENTMCNC: 30.5 GM/DL — LOW (ref 32–36)
MCV RBC AUTO: 98.6 FL — SIGNIFICANT CHANGE UP (ref 80–100)
MONOCYTES # BLD AUTO: 0.44 K/UL — SIGNIFICANT CHANGE UP (ref 0–0.9)
MONOCYTES NFR BLD AUTO: 9.8 % — SIGNIFICANT CHANGE UP (ref 2–14)
NEUTROPHILS # BLD AUTO: 2.16 K/UL — SIGNIFICANT CHANGE UP (ref 1.8–7.4)
NEUTROPHILS NFR BLD AUTO: 48.2 % — SIGNIFICANT CHANGE UP (ref 43–77)
NRBC # BLD: 0 /100 WBCS — SIGNIFICANT CHANGE UP (ref 0–0)
PHOSPHATE SERPL-MCNC: 3.3 MG/DL — SIGNIFICANT CHANGE UP (ref 2.5–4.5)
PLATELET # BLD AUTO: 118 K/UL — LOW (ref 150–400)
POTASSIUM SERPL-MCNC: 4.4 MMOL/L — SIGNIFICANT CHANGE UP (ref 3.5–5.3)
POTASSIUM SERPL-SCNC: 4.4 MMOL/L — SIGNIFICANT CHANGE UP (ref 3.5–5.3)
PROT SERPL-MCNC: 6.4 G/DL — SIGNIFICANT CHANGE UP (ref 6–8.3)
RBC # BLD: 3.53 M/UL — LOW (ref 4.2–5.8)
RBC # FLD: 17.2 % — HIGH (ref 10.3–14.5)
SODIUM SERPL-SCNC: 139 MMOL/L — SIGNIFICANT CHANGE UP (ref 135–145)
SPECIMEN SOURCE: SIGNIFICANT CHANGE UP
TACROLIMUS SERPL-MCNC: 2.5 NG/ML — SIGNIFICANT CHANGE UP
WBC # BLD: 4.49 K/UL — SIGNIFICANT CHANGE UP (ref 3.8–10.5)
WBC # FLD AUTO: 4.49 K/UL — SIGNIFICANT CHANGE UP (ref 3.8–10.5)

## 2021-08-24 PROCEDURE — 76776 US EXAM K TRANSPL W/DOPPLER: CPT

## 2021-08-24 PROCEDURE — 87077 CULTURE AEROBIC IDENTIFY: CPT

## 2021-08-24 PROCEDURE — 51702 INSERT TEMP BLADDER CATH: CPT

## 2021-08-24 PROCEDURE — 80053 COMPREHEN METABOLIC PANEL: CPT

## 2021-08-24 PROCEDURE — 87086 URINE CULTURE/COLONY COUNT: CPT

## 2021-08-24 PROCEDURE — 87040 BLOOD CULTURE FOR BACTERIA: CPT

## 2021-08-24 PROCEDURE — 86769 SARS-COV-2 COVID-19 ANTIBODY: CPT

## 2021-08-24 PROCEDURE — 84100 ASSAY OF PHOSPHORUS: CPT

## 2021-08-24 PROCEDURE — 99239 HOSP IP/OBS DSCHRG MGMT >30: CPT

## 2021-08-24 PROCEDURE — 82962 GLUCOSE BLOOD TEST: CPT

## 2021-08-24 PROCEDURE — 87186 SC STD MICRODIL/AGAR DIL: CPT

## 2021-08-24 PROCEDURE — 87635 SARS-COV-2 COVID-19 AMP PRB: CPT

## 2021-08-24 PROCEDURE — 83735 ASSAY OF MAGNESIUM: CPT

## 2021-08-24 PROCEDURE — 99285 EMERGENCY DEPT VISIT HI MDM: CPT | Mod: 25

## 2021-08-24 PROCEDURE — 87507 IADNA-DNA/RNA PROBE TQ 12-25: CPT

## 2021-08-24 PROCEDURE — 36415 COLL VENOUS BLD VENIPUNCTURE: CPT

## 2021-08-24 PROCEDURE — 85025 COMPLETE CBC W/AUTO DIFF WBC: CPT

## 2021-08-24 PROCEDURE — 80197 ASSAY OF TACROLIMUS: CPT

## 2021-08-24 PROCEDURE — 81001 URINALYSIS AUTO W/SCOPE: CPT

## 2021-08-24 RX ORDER — MAGNESIUM SULFATE 500 MG/ML
2 VIAL (ML) INJECTION ONCE
Refills: 0 | Status: COMPLETED | OUTPATIENT
Start: 2021-08-24 | End: 2021-08-24

## 2021-08-24 RX ADMIN — Medication 1 TABLET(S): at 13:07

## 2021-08-24 RX ADMIN — Medication 5 MILLIGRAM(S): at 06:18

## 2021-08-24 RX ADMIN — HEPARIN SODIUM 5000 UNIT(S): 5000 INJECTION INTRAVENOUS; SUBCUTANEOUS at 20:15

## 2021-08-24 RX ADMIN — SODIUM CHLORIDE 50 MILLILITER(S): 9 INJECTION INTRAMUSCULAR; INTRAVENOUS; SUBCUTANEOUS at 08:08

## 2021-08-24 RX ADMIN — TAMSULOSIN HYDROCHLORIDE 0.8 MILLIGRAM(S): 0.4 CAPSULE ORAL at 20:14

## 2021-08-24 RX ADMIN — Medication 1: at 09:09

## 2021-08-24 RX ADMIN — Medication 15 MILLILITER(S): at 13:08

## 2021-08-24 RX ADMIN — FINASTERIDE 5 MILLIGRAM(S): 5 TABLET, FILM COATED ORAL at 13:06

## 2021-08-24 RX ADMIN — Medication 2: at 13:06

## 2021-08-24 RX ADMIN — Medication 60 MILLIGRAM(S): at 06:20

## 2021-08-24 RX ADMIN — Medication 1 TABLET(S): at 20:14

## 2021-08-24 RX ADMIN — PIPERACILLIN AND TAZOBACTAM 25 GRAM(S): 4; .5 INJECTION, POWDER, LYOPHILIZED, FOR SOLUTION INTRAVENOUS at 00:17

## 2021-08-24 RX ADMIN — Medication 15 MILLILITER(S): at 06:17

## 2021-08-24 RX ADMIN — PIPERACILLIN AND TAZOBACTAM 25 GRAM(S): 4; .5 INJECTION, POWDER, LYOPHILIZED, FOR SOLUTION INTRAVENOUS at 09:58

## 2021-08-24 RX ADMIN — Medication 100 MILLIGRAM(S): at 13:06

## 2021-08-24 RX ADMIN — Medication 100 MILLIGRAM(S): at 06:20

## 2021-08-24 RX ADMIN — Medication 1 TABLET(S): at 06:19

## 2021-08-24 RX ADMIN — HEPARIN SODIUM 5000 UNIT(S): 5000 INJECTION INTRAVENOUS; SUBCUTANEOUS at 06:21

## 2021-08-24 RX ADMIN — HEPARIN SODIUM 5000 UNIT(S): 5000 INJECTION INTRAVENOUS; SUBCUTANEOUS at 13:08

## 2021-08-24 RX ADMIN — Medication 15 MILLILITER(S): at 20:14

## 2021-08-24 RX ADMIN — SODIUM CHLORIDE 50 MILLILITER(S): 9 INJECTION INTRAMUSCULAR; INTRAVENOUS; SUBCUTANEOUS at 00:17

## 2021-08-24 RX ADMIN — TACROLIMUS 5 MILLIGRAM(S): 5 CAPSULE ORAL at 06:19

## 2021-08-24 RX ADMIN — Medication 50 GRAM(S): at 09:09

## 2021-08-24 RX ADMIN — PIPERACILLIN AND TAZOBACTAM 25 GRAM(S): 4; .5 INJECTION, POWDER, LYOPHILIZED, FOR SOLUTION INTRAVENOUS at 16:03

## 2021-08-24 NOTE — PROGRESS NOTE ADULT - PROBLEM SELECTOR PLAN 5
Pt has documented history of BPH, in addition to prostate cancer.    Plan  -- Continue home finasteride 5 mg  -- Continue Flomax
Pt has documented history of BPH, in addition to prostate cancer.    Plan  -- Continue home finasteride  -- Continue Flomax
Unclear if this is fulminant diarrhea or constipation with overflow. Patients symptoms and presentation are atypical.  -Hold off on adding any stool softners/laxatives/anti-diarrheals  - GI PCR negative. 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    - Patient reporting insufficient stooling.  - Started metoclopramide patient reported improvement  - He was also started on topical Nitro for pain upon defecation 2/2 anal fisure

## 2021-08-24 NOTE — PROGRESS NOTE ADULT - ASSESSMENT
Mr. Damir Medrano is a 71 y/o male w/ PMHx of RCC s/p b/l nephrectomy, renal transplant on immunosuppression s/p ureteral stent, current prostate CA on radiation therapy, recurrent Pseudomonal UTIs, BPH, DM, HTN, recent failed voiding, who presents to ED with elevated Cr on outpatient labs w/ UA at ED positive for WBC, leukocyte esterase, Nitrites, otherwise asymptomatic likely CHELA 2/2 recurrent UTI currently on antibiotics.

## 2021-08-24 NOTE — PROGRESS NOTE ADULT - PROBLEM SELECTOR PLAN 4
Pt has prostate cancer on active radiation therapy. Next scheduled session was supposed to be today.    Plan  -- Spoke with radiation medicine; will hold radiation treatments for now, continue after d/c
Pt has prostate cancer on active radiation therapy. Next scheduled session was supposed to be today.    Plan  -- Spoke with radiation medicine; will hold radiation treatments for now, continue after d/c
-Resume home dose of envarsus 7 mg PO daily.  -Resume home dose of  mg PO BID.  -Resume home dose of prednisone 5 mg PO daily.  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 Continue allopurinol.
Pt has prostate cancer on active radiation therapy. Next scheduled session was supposed to be today.    Plan  -- Spoke with radiation medicine; will hold radiation treatments for now, continue after d/c
Pt has prostate cancer on active radiation therapy. Next scheduled session was supposed to be today.    Plan  -- Spoke with radiation medicine; will hold radiation treatments for now, continue after d/c

## 2021-08-24 NOTE — PROGRESS NOTE ADULT - ASSESSMENT
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    1.s/p DDRT in 2018, Allograft function with baseline Cr ~ 2.5- Now with CHELA ,Cr upto 3.12 likely in the setting of volume depletion  Last Scr improved to 2.77 mg/dl today after IV fluids   UA shows WBC count of 888, RBC 44, protein 300, LES large, nitrite positive.  Blood cx no growth, urine Cx with pseudomonas, currently on Zosyn (day 5), as per ID, suspect colonization of urinary stent   Kidney duplex done on this admission with mild fullness of the transplant collecting system and urothelial thickening, similar in appearance to the prior study dated 8/1/2021. No evidence of a significant renal artery stenosis. Elevated resistive indices are again noted, similar to prior study.    2. IS meds- Increase Envarsus to 6 mg po daily (trough today low at 2.5), Check Tac level in am ~ 30 mins PRIOR to morning dose. Resume  mg PO BID after finish abx course. Continue Prednisone 5 mg po daily.  3. UTI with h/o recurrent pseudomonas UTI in the past. As per ID, suspect colonization as above.  UC from 8/1 in clinic grew > 100K Pseudomonas. On Zosyn.     If any questions, please feel free to contact me     Nils Snow  Nephrology Fellow  [facility] Pager: [phone]  [facility] Pager: [phone]

## 2021-08-24 NOTE — PROGRESS NOTE ADULT - PROBLEM SELECTOR PROBLEM 7
Transplanted kidney
Type 2 diabetes mellitus

## 2021-08-24 NOTE — DISCHARGE NOTE NURSING/CASE MANAGEMENT/SOCIAL WORK - [id number]_GEN_ALL_CORE
For information on Fall & injury Prevention, visit [email or web address]

## 2021-08-24 NOTE — PROGRESS NOTE ADULT - PROBLEM SELECTOR PLAN 1
- Pt has history of CHELA, most recently treated in early August, as well as June. Previously d/t urinary retention, as well as developed Pseudomonal UTI. Patient baseline Cr 2.4, elevated to 2.9 on outpatient blood work, 3.12 at ED. Patient currently afebrile, no leukocytosis, asymptomatic. UA shows positive LES, nitrites, WBC count to 800s. Likely repeat offender of UTI as patient continues to produce urine through Velez, obstruction is less likely.     Plan  -- Right renal kidney U/S - no significant changes from prior U/S/imaging  -- c/w Zosyn per transplant ID recommendations (8/20 ---> )  -- Pseudomonas in UCx sensitive to ABx except to Ciprofloxacin and Levofloxacin  -- F/u ID recommendations for Abx treatment - Pt has history of CHELA, most recently treated in early August, as well as June. Previously d/t urinary retention, as well as developed Pseudomonal UTI. Patient baseline Cr 2.4, elevated to 2.9 on outpatient blood work, 3.12 at ED. Patient currently afebrile, no leukocytosis, asymptomatic. UA shows positive LES, nitrites, WBC count to 800s. Likely repeat offender of UTI as patient continues to produce urine through Velez, obstruction is less likely.     Plan  -- Right renal kidney U/S - no significant changes from prior U/S/imaging  -- c/w Zosyn per transplant ID recommendations (8/20 ---> )  -- Pseudomonas in UCx sensitive to ABx except to Ciprofloxacin and Levofloxacin  -- ID recommendations     -- Cipro/vantin - waiting on sensitivities - Pt has history of CHELA, most recently treated in early August, as well as June. Previously d/t urinary retention, as well as developed Pseudomonal UTI. Patient baseline Cr 2.4, elevated to 2.9 on outpatient blood work, 3.12 at ED. Patient currently afebrile, no leukocytosis, asymptomatic. UA shows positive LES, nitrites, WBC count to 800s. Likely repeat offender of UTI as patient continues to produce urine through Velez, obstruction is less likely.     Plan  -- Right renal kidney U/S - no significant changes from prior U/S/imaging  -- c/w Zosyn per transplant ID recommendations (8/20 ---> )  -- Pseudomonas in UCx sensitive to ABx except to Ciprofloxacin and Levofloxacin  -- ID recommendations     -- Cipro/vantin - waiting on sensitivities     -- ID says okay to d/c after last Zosyn today

## 2021-08-24 NOTE — PROGRESS NOTE ADULT - PROBLEM SELECTOR PLAN 3
Pt has documented history of diabetes mellitus. Pt takes Januvia 25, glimeperide 2 mg. Last A1C documented is 6.6 from 8/3/21. Current glucose level 151 per BMP.    Plan  -- Insulin SS Pt has documented history of diabetes mellitus. Pt takes Januvia 25, glimepiride 2 mg. Last A1C documented is 6.6 from 8/3/21. Current glucose level 151 per BMP.    Plan  -- Insulin SS

## 2021-08-24 NOTE — PROGRESS NOTE ADULT - PROBLEM SELECTOR PLAN 6
Pt has documented history of hypertension. Currently patient is on atenolol 25 mg qdaily, metoprolol 100 qdaily, nifedipine 30 mg BID. Pt systolic BP at 158 in ED, otherwise has been hemodynamically stable.    Plan  -- Continue home medications during this admission  -- Monitor BP and adjust as necessary     -- blood pressure elevated to 174 8/23; given medications continue to monitor

## 2021-08-24 NOTE — PROGRESS NOTE ADULT - PROBLEM SELECTOR PLAN 7
Pt had RCC s/p b/l nephrectomy 2015, follow by renal transplant in 2018 after HD for three years. Pt currently on Envarsus, cellcept, orgovyx, allopurinol, mycophenolate. Recent tacrolimus level from this admission is 6.2. CHELA is most likely due to UTI as opposed to transplant rejection. Patient has been compliant with his home medications, though states he hasn't been taking his Cellcept as he ran out of the medication.    Plan  -- Continue on immunosuppression treatment sans MMF/steroid  -- Continue checking tacrolimus levels in the AM 30 minutes prior to med administration  -- Consult Transplant Nephrology team for any recommendations  -- Communicate about continuing MMF/steroid on d/c Pt had RCC s/p b/l nephrectomy 2015, follow by renal transplant in 2018 after HD for three years. Pt currently on Envarsus, cellcept, orgovyx, allopurinol, mycophenolate. Recent tacrolimus level from this admission is 6.2. CHELA is most likely due to UTI as opposed to transplant rejection. Patient has been compliant with his home medications, though states he hasn't been taking his Cellcept as he ran out of the medication.    Plan  -- Continue on immunosuppression treatment sans MMF  -- Continue checking tacrolimus levels in the AM 30 minutes prior to med administration  -- Consult Transplant Nephrology team for any recommendations  -- Communicate about continuing MMF d/c

## 2021-08-24 NOTE — PROGRESS NOTE ADULT - ATTENDING COMMENTS
Pts CHELA slightly worse today  Has diarrhea - chronic  Agree with gentle hydration  Cont current immunosuppression
No ON events. Feels well, pain improving    CHELA on CKD 3 in renal transplant patient,  likely prerenal   -cr improving to 2.5 from 3.1 as near baseline  -d/c IVF  -monitor bmp  -transplant renal US unchanged mild fullness of the transplant collecting system and urothelial thickening, similar in appearance to the prior study dated 8/1/2021.  -renal following      -hold mycopheolate, reduced tacro to 5mg daily and c/w prednisone per transplant renal    UTI: hx of MDRS UTIs  -UA positive  -ID switched cefepime to zosyn  -f/u urine cx, blood cx  -afebrile, HD stable
No ON events. Reports 2-3 BM consistent with his chronic diarrhea and does not think this is different then baseline. Denies flank pain, abd pain, f.c   Really wants to go home today.    CHELA on CKD 3 in renal transplant patient,  likely prerenal   -cr improved to 2.7 with IVF  -monitor bmp  -renal US unchanged mild fullness of the transplant collecting system and urothelial thickening, similar in appearance to the prior study dated 8/1/2021.  -per transplant restart mycopheolate to 250mg bid on discharge, increase tacro to 6mg daily   -c/w prednisone   -f/u if cleared from trasplant renal for discharge, if so will need outpatient f/u Dr. Bhakta    UTI: hx of MDRS UTIs  -UA positive  -urine cx with pseudomonas  -discussed with ID, more likely patient is colonized with Pseudomonas, rather then true infection thus can discharge home today after afternoon zosyn dose   -blood cx NGTD  -afebrile, HD stable    Acute on Chronic diarrhea: given multiple antibiotics in recent past, GI PCR pending, sample not good for cdif thus not sent    Dispo: discharge home today after zosyn dose if cleared by transplant renal, needs outpatient transplant renal and ID f/u 1 week.  spent 45 min on d/c time
No ON events. Reports 8-10 wattery BM per pt, confirmed by rN. Pt reports chronic diarrhea and does not think this is different then baseline. Denies flank pain, abd pain, f.c     CHELA on CKD 3 in renal transplant patient,  likely prerenal   -cr uptrending to 2.92  -given diarrhea likely prerenal component, will start gentle IVH  -monitor bmp  -renal US unchanged mild fullness of the transplant collecting system and urothelial thickening, similar in appearance to the prior study dated 8/1/2021.  -hold mycopheolate, reduced tacro to 5mg daily and c/w prednisone per transplant renal  -f/u further transplant recs    UTI: hx of MDRS UTIs  -UA positive  -c/w zosyn  -blood cx NGTD  -urine cx with pseudomonas, does not appear to have oral options   -afebrile, HD stable  -f/u further ID recs    Acute on Chronic diarrhea: given multiple antibiotics in recent past, send GI PCR and cdif    Dispo: home pending cr improvement, diarrhea workup ID recs
No ON events. No compliants Feels well, no flank/abd pain. WAnts to go home today    CHELA on CKD 3 in renal transplant patient,  likely prerenal   -cr improving to 2.7 from 2.5 from 3.1   -monitor bmp  -transplant renal US unchanged mild fullness of the transplant collecting system and urothelial thickening, similar in appearance to the prior study dated 8/1/2021.  -renal following  -hold mycopheolate, reduced tacro to 5mg daily and c/w prednisone per transplant renal    UTI: hx of MDRS UTIs  -UA positive  -c/w zosyn  -blood cx NGTD  -urine cx with psuedomas, pending sensitives  -afebrile, HD stable .  -discussed with ID, rec waiting for urine cx sensitives and reassessment soledad by Dr Hall    Dispo: home soledad likely

## 2021-08-24 NOTE — DISCHARGE NOTE NURSING/CASE MANAGEMENT/SOCIAL WORK - PATIENT PORTAL LINK FT
You can access the FollowMyHealth Patient Portal offered by [facility] by registering at the following website: [email or web address]. By joining [email or web address]’s FollowMyHealth portal, you will also be able to view your health information using other applications (apps) compatible with our system.

## 2021-08-24 NOTE — PROGRESS NOTE ADULT - PROBLEM SELECTOR PLAN 2
Pt has a history of recurrent UTI, particularly in the last month as he has had at least 3 episodes. Patient has previously had MDR UTI, pseudomonas UTI sensitive to cefepime. UA positive for UTI as noted above. Patient treated on outpatient Bactrim prophylactically but continues to have UTI and retention. Source of recurrence can be retention, Velez cath present for several weeks    Plan   -- As listed under CHELA      -- Pseudomonas in UCx sensitive to ABx except to Ciprofloxacin and Levofloxacin  -- Consult transplant nephrology/urology for recommendations      -- mycophenolate has been d/c'd       -- Continue tacrolimus 5 mg. Tacrolimus level from 8/23 pending      -- C/w NS at 75 cc/hr      -- c/w prednisone 5 qdaily      -- Per transplant nephro pt can c/w MMF once d/c'd and finish Abx course

## 2021-08-24 NOTE — PROGRESS NOTE ADULT - PROVIDER SPECIALTY LIST ADULT
Internal Medicine
Transplant ID
Transplant Nephrology
Transplant Nephrology
Internal Medicine

## 2021-08-24 NOTE — PROGRESS NOTE ADULT - SUBJECTIVE AND OBJECTIVE BOX
***INCOMPLETE*** Gary Kaur MD  PGY-1 Internal Medicine  Pager:  [phone]  Available on [employer] Teams  08-24-21 @ 12:31  _________________________________________________________________________________________________________________________________________    CC:      Patient is a 70y old  Male who presents with a chief complaint of Elevated outpatient Cr level (8/17) (24 Aug 2021 07:51)        SUBJECTIVE:    NAEO. Pt states that he is having diarrhea, but that this is chronic for him. He did not have any Miralax yesterday. Having a cream that he applies that helps the pain. No other acute complaints. Denies any f/c, n/v/c, no SOB, no edema, no CP, no abdominal pain.      _________________________________________________________________________________________________________________________________________    OBJECTIVE:    Vital Signs Last 24 Hrs  T(C): 36.4 (24 Aug 2021 03:56), Max: 36.4 (23 Aug 2021 13:12)  T(F): 97.6 (24 Aug 2021 03:56), Max: 97.6 (23 Aug 2021 13:12)  HR: 64 (24 Aug 2021 03:56) (64 - 75)  BP: 141/75 (24 Aug 2021 03:56) (103/65 - 141/75)  BP(mean): --  RR: 18 (24 Aug 2021 03:56) (18 - 18)  SpO2: 96% (24 Aug 2021 03:56) (96% - 98%)    I&O's Summary    23 Aug 2021 07:01  -  24 Aug 2021 07:00  --------------------------------------------------------  IN: 1920 mL / OUT: 1950 mL / NET: -30 mL    24 Aug 2021 07:01  -  24 Aug 2021 12:31  --------------------------------------------------------  IN: 390 mL / OUT: 350 mL / NET: 40 mL        MEDICATIONS  (STANDING):  allopurinol 100 milliGRAM(s) Oral daily  calcium carbonate    500 mG (Tums) Chewable 1 Tablet(s) Chew three times a day  citric acid/sodium citrate Solution 15 milliLiter(s) Oral three times a day  dextrose 40% Gel 15 Gram(s) Oral once  dextrose 5%. 1000 milliLiter(s) (50 mL/Hr) IV Continuous <Continuous>  dextrose 5%. 1000 milliLiter(s) (100 mL/Hr) IV Continuous <Continuous>  dextrose 50% Injectable 25 Gram(s) IV Push once  dextrose 50% Injectable 12.5 Gram(s) IV Push once  dextrose 50% Injectable 25 Gram(s) IV Push once  finasteride 5 milliGRAM(s) Oral daily  glucagon  Injectable 1 milliGRAM(s) IntraMuscular once  heparin   Injectable 5000 Unit(s) SubCutaneous every 8 hours  insulin lispro (ADMELOG) corrective regimen sliding scale   SubCutaneous three times a day before meals  insulin lispro (ADMELOG) corrective regimen sliding scale   SubCutaneous at bedtime  metoprolol succinate  milliGRAM(s) Oral daily  NIFEdipine XL 60 milliGRAM(s) Oral daily  piperacillin/tazobactam IVPB.. 3.375 Gram(s) IV Intermittent every 8 hours  predniSONE   Tablet 5 milliGRAM(s) Oral daily  sodium chloride 0.9%. 1000 milliLiter(s) (50 mL/Hr) IV Continuous <Continuous>  tacrolimus ER Tablet (ENVARSUS XR) 5 milliGRAM(s) Oral daily  tamsulosin 0.8 milliGRAM(s) Oral at bedtime  trimethoprim   80 mG/sulfamethoxazole 400 mG 1 Tablet(s) Oral daily    MEDICATIONS  (PRN):        PHYSICAL EXAM:    GENERAL: Laying comfortably, NAD  EYES: EOMI, PERRL, no scleral icterus  NECK: No JVD  LUNG: Clear to auscultation bilaterally; No wheeze, crackles or rhonci  HEART: Regular rate and rhythm; No murmurs, rubs, or gallops  ABDOMEN: Soft, Nontender, Nondistended, +BS,  EXTREMITIES:  No LE edema, 2+ Peripheral Pulses, No clubbing, cyanosis, or edema  PSYCH: AAOx3  NEUROLOGY: non-focal, strength 5/5 in all extremities, sensation intact  SKIN: No rashes or lesions      LABS:                            10.6   4.49  )-----------( 118      ( 24 Aug 2021 06:35 )             34.8     Auto Eosinophil # 0.06  / Auto Eosinophil % 1.3   / Auto Neutrophil # 2.16  / Auto Neutrophil % 48.2  / BANDS % x                            10.0   4.36  )-----------( 159      ( 23 Aug 2021 10:12 )             33.7     Auto Eosinophil # 0.07  / Auto Eosinophil % 1.6   / Auto Neutrophil # 2.36  / Auto Neutrophil % 54.1  / BANDS % x        08-24    139  |  105  |  25<H>  ----------------------------<  132<H>  4.4   |  20<L>  |  2.77<H>  08-23    138  |  102  |  26<H>  ----------------------------<  224<H>  4.4   |  24  |  2.92<H>    Ca    10.1      24 Aug 2021 06:33  Mg     1.7     08-24  Phos  3.3     08-24  TPro  6.4  /  Alb  3.6  /  TBili  0.2  /  DBili  x   /  AST  20  /  ALT  12  /  AlkPhos  68  08-24  TPro  6.6  /  Alb  3.9  /  TBili  0.3  /  DBili  x   /  AST  15  /  ALT  10  /  AlkPhos  60  08-23        _________________________________________________________________________________________________________________________________________  Gary Kaur MD  PGY-1 Internal Medicine  Pager: [phone]  Available on [employer] Teams  24 Aug 2021 12:31

## 2021-08-24 NOTE — PROGRESS NOTE ADULT - PROBLEM SELECTOR PROBLEM 5
Diarrhea
BPH (benign prostatic hyperplasia)

## 2021-08-24 NOTE — PROGRESS NOTE ADULT - SUBJECTIVE AND OBJECTIVE BOX
[facility] DIVISION OF KIDNEY DISEASES AND HYPERTENSION -- FOLLOW UP NOTE  --------------------------------------------------------------------------------    24 hour events/subjective: Patient was seen and examined at bedside. Reported feeling well. Denies CP, SOB, fever, chills, nausea, vomiting, diarrhea, LE edema.    PAST HISTORY  --------------------------------------------------------------------------------  No significant changes to PMH, PSH, FHx, SHx, unless otherwise noted    ALLERGIES & MEDICATIONS  --------------------------------------------------------------------------------  Allergies    No Known Allergies    Intolerances      Standing Inpatient Medications  allopurinol 100 milliGRAM(s) Oral daily  calcium carbonate    500 mG (Tums) Chewable 1 Tablet(s) Chew three times a day  citric acid/sodium citrate Solution 15 milliLiter(s) Oral three times a day  dextrose 40% Gel 15 Gram(s) Oral once  dextrose 5%. 1000 milliLiter(s) IV Continuous <Continuous>  dextrose 5%. 1000 milliLiter(s) IV Continuous <Continuous>  dextrose 50% Injectable 12.5 Gram(s) IV Push once  dextrose 50% Injectable 25 Gram(s) IV Push once  dextrose 50% Injectable 25 Gram(s) IV Push once  finasteride 5 milliGRAM(s) Oral daily  glucagon  Injectable 1 milliGRAM(s) IntraMuscular once  heparin   Injectable 5000 Unit(s) SubCutaneous every 8 hours  insulin lispro (ADMELOG) corrective regimen sliding scale   SubCutaneous three times a day before meals  insulin lispro (ADMELOG) corrective regimen sliding scale   SubCutaneous at bedtime  metoprolol succinate  milliGRAM(s) Oral daily  NIFEdipine XL 60 milliGRAM(s) Oral daily  piperacillin/tazobactam IVPB.. 3.375 Gram(s) IV Intermittent every 8 hours  predniSONE   Tablet 5 milliGRAM(s) Oral daily  sodium chloride 0.9%. 1000 milliLiter(s) IV Continuous <Continuous>  tacrolimus ER Tablet (ENVARSUS XR) 5 milliGRAM(s) Oral daily  tamsulosin 0.8 milliGRAM(s) Oral at bedtime  trimethoprim   80 mG/sulfamethoxazole 400 mG 1 Tablet(s) Oral daily    PRN Inpatient Medications      REVIEW OF SYSTEMS  --------------------------------------------------------------------------------  Gen: No fevers/chills  Respiratory: No dyspnea, cough,   CV: No chest pain, PND, orthopnea  GI: No abdominal pain, diarrhea, constipation, nausea, vomiting  Transplant: No pain  : + armenta catheter   MSK: No edema  Neuro: No dizziness/lightheadedness    All other systems were reviewed and are negative, except as noted.    VITALS/PHYSICAL EXAM  --------------------------------------------------------------------------------  T(C): 36.4 (08-24-21 @ 03:56), Max: 36.4 (08-23-21 @ 13:12)  HR: 64 (08-24-21 @ 03:56) (64 - 75)  BP: 141/75 (08-24-21 @ 03:56) (103/65 - 141/75)  RR: 18 (08-24-21 @ 03:56) (18 - 18)  SpO2: 96% (08-24-21 @ 03:56) (96% - 98%)  Wt(kg): --        08-23-21 @ 07:01  -  08-24-21 @ 07:00  --------------------------------------------------------  IN: 1920 mL / OUT: 1950 mL / NET: -30 mL    08-24-21 @ 07:01  -  08-24-21 @ 12:53  --------------------------------------------------------  IN: 390 mL / OUT: 350 mL / NET: 40 mL    Physical Exam:  	Gen: NAD, able to speak in full sentences   	HEENT: PERRL, MMM   	Pulm: CTA B/L, no crackles   	CV: RRR, S1S2+  	Abd: +BS, soft          Transplant: No tenderness, swelling  	: armenta catheter with clear yellow urine   	MSK: no edema   	Psych: Normal affect and mood  	Skin: Warm    LABS/STUDIES  --------------------------------------------------------------------------------              10.6   4.49  >-----------<  118      [08-24-21 @ 06:35]              34.8     139  |  105  |  25  ----------------------------<  132      [08-24-21 @ 06:33]  4.4   |  20  |  2.77        Ca     10.1     [08-24-21 @ 06:33]      Mg     1.7     [08-24-21 @ 06:33]      Phos  3.3     [08-24-21 @ 06:33]    TPro  6.4  /  Alb  3.6  /  TBili  0.2  /  DBili  x   /  AST  20  /  ALT  12  /  AlkPhos  68  [08-24-21 @ 06:33]          Creatinine Trend:  SCr 2.77 [08-24 @ 06:33]  SCr 2.92 [08-23 @ 10:12]  SCr 2.79 [08-22 @ 07:04]  SCr 2.56 [08-21 @ 07:48]  SCr 3.12 [08-20 @ 02:28]    Tacrolimus (), Serum: 2.5 ng/mL (08-24 @ 08:34)  Tacrolimus (), Serum: 5.2 ng/mL (08-23 @ 08:04)  Tacrolimus (), Serum: 4.9 ng/mL (08-22 @ 08:11)  Tacrolimus (), Serum: 5.0 ng/mL (08-21 @ 09:45)            Urinalysis - [08-20-21 @ 04:00]      Color Light Orange / Appearance Turbid / SG 1.017 / pH 6.5      Gluc Negative / Ketone Negative  / Bili Negative / Urobili Negative       Blood Moderate / Protein 300 mg/dL / Leuk Est Large / Nitrite Positive      RBC 44 /  / Hyaline 1 / Gran  / Sq Epi  / Non Sq Epi 0 / Bacteria Negative      Iron 75, TIBC 278, %sat 27      [06-02-21 @ 16:51]  Ferritin 450      [06-02-21 @ 17:06]  PTH -- (Ca 10.3)      [06-04-21 @ 09:37]   22  PTH -- (Ca --)      [06-02-21 @ 17:06]   15  Vitamin D (25OH) 37.0      [06-04-21 @ 09:37]  HbA1c 6.0      [08-09-19 @ 23:48]

## 2021-08-24 NOTE — PROGRESS NOTE ADULT - PROBLEM SELECTOR PLAN 8
DVT ppx: Heparin SubQ  Diet: Consistent Carb with Renal  Disposition: Pending clinical course  Code Status: FULL CODE
DVT ppx: Heparin SubQ  Diet: Consistent Carb with Renal  Disposition: Pending clinical course  Code Status: FULL CODE
-Continue flomax  - Started Finasteride  -Urology f/u    DVT PPx; HSQ TID given renal insufficiency.
DVT ppx: Heparin SubQ  Diet: Consistent Carb with Renal  Disposition: Pending clinical course  Code Status: FULL CODE

## 2021-08-24 NOTE — PROGRESS NOTE ADULT - REASON FOR ADMISSION
Elevated outpatient Cr level (8/17)

## 2021-08-24 NOTE — PROGRESS NOTE ADULT - PROBLEM SELECTOR PROBLEM 1
Acute-on-chronic kidney injury
Acute kidney injury

## 2021-08-25 LAB
CMV DNA CSF QL NAA+PROBE: SIGNIFICANT CHANGE UP
CULTURE RESULTS: SIGNIFICANT CHANGE UP
SPECIMEN SOURCE: SIGNIFICANT CHANGE UP

## 2021-08-25 PROCEDURE — 77387B: CUSTOM | Mod: 26

## 2021-08-26 LAB
CULTURE RESULTS: SIGNIFICANT CHANGE UP
SPECIMEN SOURCE: SIGNIFICANT CHANGE UP

## 2021-08-26 PROCEDURE — 77387B: CUSTOM | Mod: 26

## 2021-08-27 PROCEDURE — 77387B: CUSTOM | Mod: 26

## 2021-08-30 PROBLEM — K56.609 UNSPECIFIED INTESTINAL OBSTRUCTION, UNSPECIFIED AS TO PARTIAL VERSUS COMPLETE OBSTRUCTION: Chronic | Status: ACTIVE | Noted: 2019-02-12

## 2021-08-30 PROBLEM — T86.19 OTHER COMPLICATION OF KIDNEY TRANSPLANT: Chronic | Status: ACTIVE | Noted: 2021-08-01

## 2021-08-30 PROBLEM — C64.9 MALIGNANT NEOPLASM OF UNSPECIFIED KIDNEY, EXCEPT RENAL PELVIS: Chronic | Status: ACTIVE | Noted: 2021-08-01

## 2021-08-31 ENCOUNTER — APPOINTMENT (OUTPATIENT)
Dept: UROLOGY | Facility: CLINIC | Age: 71
End: 2021-08-31
Payer: MEDICARE

## 2021-08-31 ENCOUNTER — OUTPATIENT (OUTPATIENT)
Dept: OUTPATIENT SERVICES | Facility: HOSPITAL | Age: 71
LOS: 1 days | End: 2021-08-31
Payer: MEDICARE

## 2021-08-31 VITALS
WEIGHT: 206 LBS | DIASTOLIC BLOOD PRESSURE: 69 MMHG | HEIGHT: 71 IN | RESPIRATION RATE: 17 BRPM | BODY MASS INDEX: 28.84 KG/M2 | HEART RATE: 76 BPM | SYSTOLIC BLOOD PRESSURE: 150 MMHG | TEMPERATURE: 98 F

## 2021-08-31 DIAGNOSIS — Z90.5 ACQUIRED ABSENCE OF KIDNEY: Chronic | ICD-10-CM

## 2021-08-31 DIAGNOSIS — I77.0 ARTERIOVENOUS FISTULA, ACQUIRED: Chronic | ICD-10-CM

## 2021-08-31 DIAGNOSIS — Z98.890 OTHER SPECIFIED POSTPROCEDURAL STATES: Chronic | ICD-10-CM

## 2021-08-31 DIAGNOSIS — R35.0 FREQUENCY OF MICTURITION: ICD-10-CM

## 2021-08-31 DIAGNOSIS — Z94.0 KIDNEY TRANSPLANT STATUS: Chronic | ICD-10-CM

## 2021-08-31 PROCEDURE — 51798 US URINE CAPACITY MEASURE: CPT

## 2021-08-31 PROCEDURE — 99213 OFFICE O/P EST LOW 20 MIN: CPT | Mod: 25

## 2021-08-31 PROCEDURE — 51700 IRRIGATION OF BLADDER: CPT

## 2021-09-01 DIAGNOSIS — C61 MALIGNANT NEOPLASM OF PROSTATE: ICD-10-CM

## 2021-09-01 DIAGNOSIS — R33.8 OTHER RETENTION OF URINE: ICD-10-CM

## 2021-09-06 ENCOUNTER — EMERGENCY (EMERGENCY)
Facility: HOSPITAL | Age: 71
LOS: 1 days | Discharge: HOME | End: 2021-09-06
Attending: STUDENT IN AN ORGANIZED HEALTH CARE EDUCATION/TRAINING PROGRAM
Payer: MEDICARE

## 2021-09-06 VITALS
DIASTOLIC BLOOD PRESSURE: 82 MMHG | RESPIRATION RATE: 18 BRPM | HEART RATE: 80 BPM | SYSTOLIC BLOOD PRESSURE: 133 MMHG | OXYGEN SATURATION: 99 % | TEMPERATURE: 98 F

## 2021-09-06 VITALS
SYSTOLIC BLOOD PRESSURE: 152 MMHG | WEIGHT: 210.1 LBS | OXYGEN SATURATION: 96 % | HEART RATE: 81 BPM | TEMPERATURE: 99 F | DIASTOLIC BLOOD PRESSURE: 81 MMHG | HEIGHT: 70.5 IN | RESPIRATION RATE: 20 BRPM

## 2021-09-06 DIAGNOSIS — I77.0 ARTERIOVENOUS FISTULA, ACQUIRED: Chronic | ICD-10-CM

## 2021-09-06 DIAGNOSIS — Z98.890 OTHER SPECIFIED POSTPROCEDURAL STATES: Chronic | ICD-10-CM

## 2021-09-06 DIAGNOSIS — Z94.0 KIDNEY TRANSPLANT STATUS: Chronic | ICD-10-CM

## 2021-09-06 DIAGNOSIS — Z90.5 ACQUIRED ABSENCE OF KIDNEY: Chronic | ICD-10-CM

## 2021-09-06 LAB
ALBUMIN SERPL ELPH-MCNC: 3.8 G/DL — SIGNIFICANT CHANGE UP (ref 3.3–5)
ALP SERPL-CCNC: 81 U/L — SIGNIFICANT CHANGE UP (ref 40–120)
ALT FLD-CCNC: 28 U/L — SIGNIFICANT CHANGE UP (ref 10–45)
ANION GAP SERPL CALC-SCNC: 12 MMOL/L — SIGNIFICANT CHANGE UP (ref 5–17)
AST SERPL-CCNC: 28 U/L — SIGNIFICANT CHANGE UP (ref 10–40)
BASOPHILS # BLD AUTO: 0.03 K/UL — SIGNIFICANT CHANGE UP (ref 0–0.2)
BASOPHILS NFR BLD AUTO: 0.7 % — SIGNIFICANT CHANGE UP (ref 0–2)
BILIRUB SERPL-MCNC: 0.2 MG/DL — SIGNIFICANT CHANGE UP (ref 0.2–1.2)
BUN SERPL-MCNC: 35 MG/DL — HIGH (ref 7–23)
CALCIUM SERPL-MCNC: 10.5 MG/DL — SIGNIFICANT CHANGE UP (ref 8.4–10.5)
CHLORIDE SERPL-SCNC: 104 MMOL/L — SIGNIFICANT CHANGE UP (ref 96–108)
CO2 SERPL-SCNC: 23 MMOL/L — SIGNIFICANT CHANGE UP (ref 22–31)
CREAT SERPL-MCNC: 3.01 MG/DL — HIGH (ref 0.5–1.3)
EOSINOPHIL # BLD AUTO: 0.12 K/UL — SIGNIFICANT CHANGE UP (ref 0–0.5)
EOSINOPHIL NFR BLD AUTO: 2.9 % — SIGNIFICANT CHANGE UP (ref 0–6)
GAS PNL BLDV: SIGNIFICANT CHANGE UP
GLUCOSE SERPL-MCNC: 160 MG/DL — HIGH (ref 70–99)
HCT VFR BLD CALC: 34.6 % — LOW (ref 39–50)
HGB BLD-MCNC: 10.5 G/DL — LOW (ref 13–17)
IMM GRANULOCYTES NFR BLD AUTO: 1 % — SIGNIFICANT CHANGE UP (ref 0–1.5)
LYMPHOCYTES # BLD AUTO: 0.99 K/UL — LOW (ref 1–3.3)
LYMPHOCYTES # BLD AUTO: 24.3 % — SIGNIFICANT CHANGE UP (ref 13–44)
MCHC RBC-ENTMCNC: 30.2 PG — SIGNIFICANT CHANGE UP (ref 27–34)
MCHC RBC-ENTMCNC: 30.3 GM/DL — LOW (ref 32–36)
MCV RBC AUTO: 99.4 FL — SIGNIFICANT CHANGE UP (ref 80–100)
MONOCYTES # BLD AUTO: 0.32 K/UL — SIGNIFICANT CHANGE UP (ref 0–0.9)
MONOCYTES NFR BLD AUTO: 7.8 % — SIGNIFICANT CHANGE UP (ref 2–14)
NEUTROPHILS # BLD AUTO: 2.58 K/UL — SIGNIFICANT CHANGE UP (ref 1.8–7.4)
NEUTROPHILS NFR BLD AUTO: 63.3 % — SIGNIFICANT CHANGE UP (ref 43–77)
NRBC # BLD: 0 /100 WBCS — SIGNIFICANT CHANGE UP (ref 0–0)
PLATELET # BLD AUTO: 159 K/UL — SIGNIFICANT CHANGE UP (ref 150–400)
POTASSIUM SERPL-MCNC: 4.9 MMOL/L — SIGNIFICANT CHANGE UP (ref 3.5–5.3)
POTASSIUM SERPL-SCNC: 4.9 MMOL/L — SIGNIFICANT CHANGE UP (ref 3.5–5.3)
PROT SERPL-MCNC: 6.5 G/DL — SIGNIFICANT CHANGE UP (ref 6–8.3)
RBC # BLD: 3.48 M/UL — LOW (ref 4.2–5.8)
RBC # FLD: 15.9 % — HIGH (ref 10.3–14.5)
SODIUM SERPL-SCNC: 139 MMOL/L — SIGNIFICANT CHANGE UP (ref 135–145)
WBC # BLD: 4.08 K/UL — SIGNIFICANT CHANGE UP (ref 3.8–10.5)
WBC # FLD AUTO: 4.08 K/UL — SIGNIFICANT CHANGE UP (ref 3.8–10.5)

## 2021-09-06 PROCEDURE — 80053 COMPREHEN METABOLIC PANEL: CPT

## 2021-09-06 PROCEDURE — 82435 ASSAY OF BLOOD CHLORIDE: CPT

## 2021-09-06 PROCEDURE — 84132 ASSAY OF SERUM POTASSIUM: CPT

## 2021-09-06 PROCEDURE — 82947 ASSAY GLUCOSE BLOOD QUANT: CPT

## 2021-09-06 PROCEDURE — 74176 CT ABD & PELVIS W/O CONTRAST: CPT | Mod: 26,MA

## 2021-09-06 PROCEDURE — 83605 ASSAY OF LACTIC ACID: CPT

## 2021-09-06 PROCEDURE — 99284 EMERGENCY DEPT VISIT MOD MDM: CPT | Mod: 25

## 2021-09-06 PROCEDURE — 99284 EMERGENCY DEPT VISIT MOD MDM: CPT | Mod: GC

## 2021-09-06 PROCEDURE — 85025 COMPLETE CBC W/AUTO DIFF WBC: CPT

## 2021-09-06 PROCEDURE — 85014 HEMATOCRIT: CPT

## 2021-09-06 PROCEDURE — 82330 ASSAY OF CALCIUM: CPT

## 2021-09-06 PROCEDURE — 74176 CT ABD & PELVIS W/O CONTRAST: CPT | Mod: MA

## 2021-09-06 PROCEDURE — 84295 ASSAY OF SERUM SODIUM: CPT

## 2021-09-06 PROCEDURE — 85018 HEMOGLOBIN: CPT

## 2021-09-06 PROCEDURE — 82803 BLOOD GASES ANY COMBINATION: CPT

## 2021-09-06 RX ORDER — IBUPROFEN 200 MG
400 TABLET ORAL ONCE
Refills: 0 | Status: COMPLETED | OUTPATIENT
Start: 2021-09-06 | End: 2021-09-06

## 2021-09-06 RX ORDER — ACETAMINOPHEN 500 MG
975 TABLET ORAL ONCE
Refills: 0 | Status: COMPLETED | OUTPATIENT
Start: 2021-09-06 | End: 2021-09-06

## 2021-09-06 RX ADMIN — Medication 975 MILLIGRAM(S): at 04:06

## 2021-09-06 RX ADMIN — Medication 400 MILLIGRAM(S): at 04:06

## 2021-09-06 NOTE — ED ADULT NURSE REASSESSMENT NOTE - NS ED NURSE REASSESS COMMENT FT1
Report received from Hannah Bella break coverage NAHUM. Report received from Hannah Bella, break coverage RN. Pt with geovany in which he states was placed 8/31. Pt A&Ox4, moves all extremities, changed into hospital gown with safety maintained.

## 2021-09-06 NOTE — ED PROVIDER NOTE - RECTAL
No hemorrhoids/anal fissures upon inspection/TENDER No superficial or internal hemorrhoids or  anal fissures upon inspection/TENDER

## 2021-09-06 NOTE — ED PROVIDER NOTE - NSFOLLOWUPINSTRUCTIONS_ED_ALL_ED_FT
- Please follow up with your Gastroenterologist within 72 hours. Bring your results from today.    - Tylenol up to 650 mg every 6 hours as needed for pain.    - Be sure to return to the ED if you develop new, worsening, or any distressing symptoms.

## 2021-09-06 NOTE — ED PROVIDER NOTE - PATIENT PORTAL LINK FT
You can access the FollowMyHealth Patient Portal offered by [facility] by registering at the following website: [email or web address]. By joining [employer]’s FollowMyHealth portal, you will also be able to view your health information using other applications (apps) compatible with our system.

## 2021-09-06 NOTE — ED PROVIDER NOTE - CLINICAL SUMMARY MEDICAL DECISION MAKING FREE TEXT BOX
Patient is a 69 yo male with PMHx of prostate cancer, DM, and HLD and surgical hx of ileostomy and B/L renal transplant 2/2 to RCC. Patient had rectal bleeding and pain over past 2 yrs with worsening symptoms in the past month. Currently receiving radiation tx for prostate CA for 1 month (28th cycle). Provided Patient is a 69 yo male with PMHx of prostate cancer, DM, and HLD and surgical hx of ileostomy and B/L renal transplant 2/2 to RCC. Patient had rectal bleeding and pain over past 2 yrs with worsening symptoms in the past month. Currently receiving radiation tx for prostate CA for 1 month (28th cycle).       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 Reassess. Patient is a 69 yo male with PMHx of prostate cancer, DM, and HLD and surgical hx of ileostomy and B/L renal transplant 2/2 to RCC. Patient had rectal bleeding and pain over past 2 yrs with worsening symptoms in the past month. Currently receiving radiation tx for prostate CA for 1 month (28th cycle).   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 Reassess.    Attending MD Munoz: Agree with above. Patient here with chronic rectal bleeding and pain that has acutely worsened over the last few days. Patient is having significant pain with bowel movements. Rectal exam with significant discomfort. Will obtain CTAP to assess for proctitis vs abscess. Will reassess. If CT negative will likely place in CDU vs discuss with GI for management of proctitis outpt if labs normal.   Patient's Gastroenterologist Daniella Maldonado [phone]

## 2021-09-06 NOTE — ED PROVIDER NOTE - PHYSICAL EXAMINATION
GENERAL: no acute distress, non-toxic appearing  HEENT: normal conjunctiva  CARDIAC: regular rate and regular rhythm, warm perfused extremities  PULM: no increased wob, sats well on RA  GI: abdomen nondistended, soft, nontender, rectal exam done with Anurag Santo MS4: no fissures/external or internal hermorrhoids/no brbpr or melena though pt with brown mucoid appearing stool in camode, pt with significant pain upon digital rectal insertion  : no CVA tenderness, no suprapubic tenderness, armenta in place draining straw colored urine  NEURO: alert and oriented x 3, normal speech, moving all extremities without lateralization  MSK: no visible deformities, no peripheral edema  SKIN: no visible rashes  PSYCH: appropriate mood and affect GENERAL: no acute distress, non-toxic appearing  HEENT: normal conjunctiva  CARDIAC: regular rate and regular rhythm, warm perfused extremities  PULM: no increased wob, sats well on RA  GI: abdomen nondistended, soft, nontender, rectal exam done with Anurag Santo MS4: no fissures/external or internal hermorrhoids/no brbpr or melena though pt with brown mucoid appearing stool in commode, pt with significant pain upon digital rectal exam, no fluctuance or mass palpated.   : no CVA tenderness, no suprapubic tenderness, armenta in place draining straw colored urine  NEURO: alert and oriented x 3, normal speech, moving all extremities without lateralization  MSK: no visible deformities, no peripheral edema  SKIN: no visible rashes  PSYCH: appropriate mood and affect

## 2021-09-06 NOTE — ED PROVIDER NOTE - PROGRESS NOTE DETAILS
Zaki, PGY3: pt resting comfortably in bed/sleeping, states he only had to stool 1x while here, pt is done with radiation therapy, explained that pt needs to f/u with GI doctor

## 2021-09-06 NOTE — ED PROVIDER NOTE - OBJECTIVE STATEMENT
Patient is a 69 yo male with PMHx of Prostate cancer (with armenta cath due to urinary retention 2/2 radiation tx- 1 mo rad w/ 28 cycles), HTN, DM and Surgical Hx of upper ileostomy reversal (2017), s/p B/L renal transplant (2015) with left arm fistula. Reports rectal bleeding and pain for the past 2 yrs. Notes bright red blood on toilet paper with BM. Pain exacerbated with diarrhea (intermittent since ileostomy). Last BM was 15 min before arrival with worsening rectal pain and bleeding which brought him to ED. Endorses constipation and diarrhea (intermittent). Denies fever, nausea, vomiting, hematemesis, NSAID/Blood thinner use, weight loss. Taking prednisone, mycophenolate, and tacrolimus immunosuppressants post transplant. Patient is a 69 yo male with PMHx of Prostate cancer (with armenta cath due to urinary retention 2/2 radiation tx- 1 mo rad w/ 28 cycles), HTN, DM and Surgical Hx of upper ileostomy reversal (2017), s/p B/L renal transplant (2015) with left arm fistula. Reports rectal bleeding and pain for the past 2 yrs that has acutely worsened over the past two days. Notes bright red blood on toilet paper with BM. Pain exacerbated with diarrhea (intermittent since ileostomy). Last BM was 15 min before arrival with worsening rectal pain and bleeding which brought him to ED. Endorses constipation and diarrhea (intermittent). Denies fever, nausea, vomiting, hematemesis, NSAID/Blood thinner use, weight loss. Taking prednisone, mycophenolate, and tacrolimus immunosuppressants post transplant.

## 2021-09-06 NOTE — ED PROVIDER NOTE - NSICDXPASTSURGICALHX_GEN_ALL_CORE_FT
PAST SURGICAL HISTORY:  AV fistula 2013/ left forearm    H/O ileostomy 2017   for 3 months. s/p Reversal    Kidney transplant recipient 2018  @ [facility] :  +  Hep C Donor    S/p nephrectomy left 9/15/2015   + Cancer    S/p nephrectomy right 12/10/2015   benign    S/P right knee arthroscopy

## 2021-09-06 NOTE — ED ADULT NURSE NOTE - OBJECTIVE STATEMENT
here with rectal bleeding tonight; reports diarrhea and noted BRP upon wiping rectum; states he has rectal fissure and rectal pain x 2 years; currently undergoing radiation treatment for prostate ca, last treatment on Friday; pt has indwelling Velez cath in place due to urinary retention from radiation treatments; hx bilateral nephrectomy in 2015 due to renal cancer; pt has a renal transplant; has av fistual left arm not used anymore; pink band in place left arm; pt is fully alert and oriented; skin warm and dry.

## 2021-09-06 NOTE — ED PROVIDER NOTE - ATTENDING CONTRIBUTION TO CARE
I have personally seen and examined this patient with the resident/fellow.  I have fully participated in the care of this patient. I have reviewed all pertinent clinical information, including history, physical exam, plan and the Resident/Fellow’s note and agree except as noted. See MDM I have personally seen and examined this patient with the resident/fellow and student.  I have fully participated in the care of this patient. I have reviewed all pertinent clinical information, including history, physical exam, plan and the Resident/Fellow’s and student's note and agree except as noted. See MDM

## 2021-09-06 NOTE — ED PROVIDER NOTE - NSICDXPASTMEDICALHX_GEN_ALL_CORE_FT
PAST MEDICAL HISTORY:  Anal fissure dx: ' 2018   No surgery    Benign prostatic hypertrophy     Bowel obstruction ' 2017   surgically treated w/ ileostomy; since reversed    ESRD (end stage renal disease) On Dialysis ' 2015 to 7/2018    Hepatitis C In Donor Kidney: patient received treatment for Hep. C : post treatment: patient  tested Negative for Hep C    HTN (hypertension)     Medial meniscus tear ' 90's  Right    Prostate cancer on active radiation therapy    Renal cell carcinoma s/p b/l nephrectomy and renal transplant in 2018    Type 2 diabetes mellitus dx: '88    Ureteral stricture of kidney transplant 2018

## 2021-09-08 ENCOUNTER — NON-APPOINTMENT (OUTPATIENT)
Age: 71
End: 2021-09-08

## 2021-09-10 ENCOUNTER — APPOINTMENT (OUTPATIENT)
Dept: UROLOGY | Facility: CLINIC | Age: 71
End: 2021-09-10
Payer: MEDICARE

## 2021-09-10 VITALS — HEART RATE: 68 BPM | DIASTOLIC BLOOD PRESSURE: 60 MMHG | SYSTOLIC BLOOD PRESSURE: 114 MMHG

## 2021-09-10 DIAGNOSIS — N40.0 BENIGN PROSTATIC HYPERPLASIA WITHOUT LOWER URINARY TRACT SYMPTOMS: ICD-10-CM

## 2021-09-10 DIAGNOSIS — R33.8 OTHER RETENTION OF URINE: ICD-10-CM

## 2021-09-10 DIAGNOSIS — C61 MALIGNANT NEOPLASM OF PROSTATE: ICD-10-CM

## 2021-09-10 PROCEDURE — 99213 OFFICE O/P EST LOW 20 MIN: CPT

## 2021-09-13 NOTE — PHYSICAL EXAM
[General Appearance - Alert] : alert [General Appearance - In No Acute Distress] : in no acute distress [Skin Color & Pigmentation] : normal skin color and pigmentation [de-identified] : armenta catheter

## 2021-09-13 NOTE — DISEASE MANAGEMENT
[Clinical] : TNM Stage: c [II] : II [TTNM] : 1c [NTNM] : 0 [MTNM] : 0 [de-identified] : 7000 cGy  [de-identified] : prostate

## 2021-09-13 NOTE — HISTORY OF PRESENT ILLNESS
[FreeTextEntry1] : 71 y/o male with h/o kidney transplant (bilateral nephrectomy) in 2018, HTN, DM2, newly diagnosed prostate cancer presents to discuss radiation treatment for prostate cancer. \par \par Pathology in 3/2021 showed Mauricio 4+3 in 2 cores with 80% of tissue involvement, [place] 3+4 in 4 cores with 60% involvement, Mauricio 3+3 in 2 cores with 10% involvement. Total 14 biopsy cores, with 7 cores positive with cancer.  PSA 5.27 in 12/2020, PSA 4.29 in 10/2020, PSA 3.41 in 5/2018\par \par 8/10/21: 18/28 fx Recent inpt admission at [facility] for urinary retention. Now with indwelling catheter. Denies pain \par \par 7/27/21: 13/28 fx Tolerating RT, urinary frequency increased since starting radiation, up every hour over night. Taking Flomax. No bowel deterioration. Mild fatigue.

## 2021-09-15 NOTE — DIETITIAN INITIAL EVALUATION ADULT. - NUTRITION INTERVENTION
PHYSICIAN ORDERS      DATE & TIME ISSUED: September 15, 2021 9:23 AM  PATIENT NAME: Harshad Beatty   : 1981     [facility] MR# [if applicable]: [id number]     DIAGNOSIS/ICD-10 CODES: Kidney Transplanted (Z94.0);  Vitamin D Deficiency (E55.9); Parathyroid Hormone Excess (E21.3)    Please draw the following labs at next scheduled lab visit ~approx Oct 2021 (and in addition to standing post-transplant labs: BMP, CBC, Tacrolimus level, BK virus PCR, Urine Protein Creatinine ratio).  - Vitamin D Deficiency level (25-OH Vit D)  - Parathyroid Hormone Level    Any questions please call: [phone] option [phone]  Fax results to:  [phone].      Ed Garnica MD      
[employer]als and Sn[employer]/Nutrition Education

## 2021-09-19 NOTE — PROVIDER CONTACT NOTE (OTHER) - REASON
pt c/o watery stools x5, states that it has gotten worse since being admitted to the hospital NEGATIVE

## 2021-09-23 ENCOUNTER — APPOINTMENT (OUTPATIENT)
Dept: UROLOGY | Facility: CLINIC | Age: 71
End: 2021-09-23
Payer: MEDICARE

## 2021-09-23 ENCOUNTER — OUTPATIENT (OUTPATIENT)
Dept: OUTPATIENT SERVICES | Facility: HOSPITAL | Age: 71
LOS: 1 days | End: 2021-09-23
Payer: MEDICARE

## 2021-09-23 VITALS
HEIGHT: 70 IN | TEMPERATURE: 98.6 F | DIASTOLIC BLOOD PRESSURE: 65 MMHG | HEART RATE: 76 BPM | RESPIRATION RATE: 17 BRPM | WEIGHT: 206 LBS | SYSTOLIC BLOOD PRESSURE: 121 MMHG | BODY MASS INDEX: 29.49 KG/M2

## 2021-09-23 DIAGNOSIS — Z90.5 ACQUIRED ABSENCE OF KIDNEY: Chronic | ICD-10-CM

## 2021-09-23 DIAGNOSIS — Z94.0 KIDNEY TRANSPLANT STATUS: Chronic | ICD-10-CM

## 2021-09-23 DIAGNOSIS — Z98.890 OTHER SPECIFIED POSTPROCEDURAL STATES: Chronic | ICD-10-CM

## 2021-09-23 DIAGNOSIS — I77.0 ARTERIOVENOUS FISTULA, ACQUIRED: Chronic | ICD-10-CM

## 2021-09-23 PROCEDURE — 99214 OFFICE O/P EST MOD 30 MIN: CPT | Mod: 25

## 2021-09-23 PROCEDURE — 51798 US URINE CAPACITY MEASURE: CPT

## 2021-09-23 PROCEDURE — 51700 IRRIGATION OF BLADDER: CPT

## 2021-09-29 DIAGNOSIS — C61 MALIGNANT NEOPLASM OF PROSTATE: ICD-10-CM

## 2021-09-29 DIAGNOSIS — R33.8 OTHER RETENTION OF URINE: ICD-10-CM

## 2021-09-30 LAB — BACTERIA UR CULT: ABNORMAL

## 2021-10-05 ENCOUNTER — APPOINTMENT (OUTPATIENT)
Dept: NEPHROLOGY | Facility: CLINIC | Age: 71
End: 2021-10-05
Payer: MEDICARE

## 2021-10-05 VITALS — HEART RATE: 72 BPM | RESPIRATION RATE: 12 BRPM | DIASTOLIC BLOOD PRESSURE: 60 MMHG | SYSTOLIC BLOOD PRESSURE: 130 MMHG

## 2021-10-05 PROCEDURE — 99214 OFFICE O/P EST MOD 30 MIN: CPT

## 2021-10-05 RX ORDER — CIPROFLOXACIN HYDROCHLORIDE 500 MG/1
500 TABLET, FILM COATED ORAL DAILY
Qty: 14 | Refills: 0 | Status: DISCONTINUED | COMMUNITY
Start: 2021-07-22 | End: 2021-10-05

## 2021-10-05 NOTE — ASSESSMENT
[FreeTextEntry1] : 1. CKD of kidney transplantation - Pts aaron creatinine 1.9 but had CHELA with pyelonephritis. Has had multiple episodes of CHELA.  Pt in process of relisting but on hold due to prostate cancer.  Cell free DNA 0.2% in October end.  Last creatinine 3 but told that up to 5 - will repeat today.  Pt also has internal JJ stent, likely needs stent exchange for recurrent urine infections.  Will need stent exchanges.  \par 2. Immunosuppression - simulect induction, tacrolimus target 5-7, Envarsus 5 mgs, MMF 250mgs BID.  \par 3. DM2 - on januvia and glimepiride. BG controlled at home.  A1c at goal. \par 4. HTN - controlled\par 5. HCV - genotype 3a. completed Epclusa. Last vl note detected. \par 6. Anemia - due to CKD/CHELA.  last Hb at goal. \par 7. Chronic diarrhea - on and off on lomotil prn. Has seen GI and had a colonoscopy in 2019. Has history of small bowel resection which may be the cause of diarrhea. 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 \par 9.  UTI - last culture positive with MDR organism.  May need admission to treat. \par 10.  COVID19 -  Resolved.  Received monoclonal antibody and recently vaccinated.  \par 11.  Prostate cancer - Pt on androgen depletion and on RT\par 12.  Urine retention - needs TURP.  Has a armenta catheter. \par \par f/u in 2 months. \par Will consider ureteral stent exchange and TURP during hospitalization with IV antibiotics.  \par Pt will also discuss surgical treatment for anal fissure.

## 2021-10-05 NOTE — HISTORY OF PRESENT ILLNESS
[80: Normal activity with effort; some signs or symptoms of disease.] : 80: Normal activity with effort; some signs or symptoms of disease.  [de-identified] : Tube exchanged October 29th.  Pt diagnosed with Covid, wife was diagnosed with Covid on Jan 28th 2021.  On 29th pt was tested in an urgent care, tested positive and went to [place].  Had no symptoms.  Received the monoclonal antibody on the 29th.  Creatinine in 3's in [facility] and he was admitted and received 5 days of IV antibiotics for urine infection.\par \par Pt admitted June 2nd 2021 for CHELA and hyperkalemia.  Found to have UTI and treated.  Had pseudomonas in urine and received zosyn.  CHELA improved before discharge.  Creatinine decreased to 2.7 before discharge.  Stent internalized\par \par Receiving RT therapy for prostate. Has had admissions with urinary retention after RT started.  \par Saw an oncologist who recently performed labs and mentioned that creatinine was high.  Pt still has armenta catheter and was advised to have a TURP.  Pt will be seeing Dr. Collins this Thursday.  Had a positive urine culture end of September.  \par \par Pt and sister would like us to call a friend Dr. Sean Salinas [phone]. [TextBox_42] : Mr. Medrano is a 70 y.o male with a hsitory of ESRD since 2015 after bilateral nephrectomies for RCC s/p DDRT 2018 who presents for transplant relisting.  Of note his current eGFR is about 22.  He follows with Dr. Bonilla.\par He has known DM  since age 41, HTN.\par Has b/l nephrectomies for RCC,  left on 2015 and right 2015.\par H/o intestinal blockage and was hospitalized at [facility] in 2017. Had surgical correction. Has left UE AVF.\par \par s/p  donor kidney transplant on 18.\par HCV donor. 40 yrs old male. cold ischemia time 23 hrs. Had delayed graft function but kidney eventually worked with best creatinine 1.9. \par \par PT had several admissions post transplant.  Early on he was readmitted for anemia and hematoma. Required 2 kidney biopsies which revealed few oxalate crystals and borderline rejection for which he was treated with solumedrol 375 and prednisone taper.  He is on bicitra to reduce risk of oxalate stones.  Also admitted for CHELA few times, UTI's and CMV. \par Pt admitted for CHELA and hydronephrosis 2020. Had nephrostomy tube then NU tube placed. then admitted for UTI. Went for ureteral stricture dilation and replacement of NU tube on 8/3/20.\par \par Pt was treated for UTI in 2020.  Pt had NU tube check in September, was not changed. Going to have it rechecked/ removed tomorrow.  Creatinine now about 3.  \par \par Social: Non smoker, no alcohol or drug use.  Currently working, . \par \par Able to walk 5-6 blocks, can climb stairs.\par ROS: Has no shortness of breath on exertion. \par Fam: Parents are . Father had DM, Mother had breast cancer.\par Has no children; Wife, healthy, 61 years. 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

## 2021-10-06 ENCOUNTER — NON-APPOINTMENT (OUTPATIENT)
Age: 71
End: 2021-10-06

## 2021-10-06 LAB
ALBUMIN SERPL ELPH-MCNC: 4.2 G/DL
ALP BLD-CCNC: 91 U/L
ALT SERPL-CCNC: 11 U/L
ANION GAP SERPL CALC-SCNC: 19 MMOL/L
APPEARANCE: ABNORMAL
AST SERPL-CCNC: 21 U/L
BACTERIA: ABNORMAL
BASOPHILS # BLD AUTO: 0.03 K/UL
BASOPHILS NFR BLD AUTO: 0.6 %
BILIRUB SERPL-MCNC: <0.2 MG/DL
BILIRUBIN URINE: NEGATIVE
BLOOD URINE: ABNORMAL
BUN SERPL-MCNC: 41 MG/DL
CALCIUM SERPL-MCNC: 10.7 MG/DL
CHLORIDE SERPL-SCNC: 103 MMOL/L
CO2 SERPL-SCNC: 17 MMOL/L
COLOR: YELLOW
CREAT SERPL-MCNC: 3.29 MG/DL
CREAT SPEC-SCNC: 191 MG/DL
CREAT/PROT UR: 0.7 RATIO
EOSINOPHIL # BLD AUTO: 0.05 K/UL
EOSINOPHIL NFR BLD AUTO: 1.1 %
GLUCOSE QUALITATIVE U: NEGATIVE
GLUCOSE SERPL-MCNC: 151 MG/DL
HCT VFR BLD CALC: 39 %
HGB BLD-MCNC: 12.2 G/DL
HYALINE CASTS: 1 /LPF
IMM GRANULOCYTES NFR BLD AUTO: 0.9 %
KETONES URINE: NEGATIVE
LDH SERPL-CCNC: 173 U/L
LEUKOCYTE ESTERASE URINE: ABNORMAL
LYMPHOCYTES # BLD AUTO: 1.61 K/UL
LYMPHOCYTES NFR BLD AUTO: 34.8 %
MAGNESIUM SERPL-MCNC: 1.7 MG/DL
MAN DIFF?: NORMAL
MCHC RBC-ENTMCNC: 30.9 PG
MCHC RBC-ENTMCNC: 31.3 GM/DL
MCV RBC AUTO: 98.7 FL
MICROSCOPIC-UA: NORMAL
MONOCYTES # BLD AUTO: 0.59 K/UL
MONOCYTES NFR BLD AUTO: 12.7 %
NEUTROPHILS # BLD AUTO: 2.31 K/UL
NEUTROPHILS NFR BLD AUTO: 49.9 %
NITRITE URINE: NEGATIVE
PH URINE: 6
PHOSPHATE SERPL-MCNC: 3.6 MG/DL
PLATELET # BLD AUTO: 130 K/UL
POTASSIUM SERPL-SCNC: 5.2 MMOL/L
PROT SERPL-MCNC: 7.3 G/DL
PROT UR-MCNC: 140 MG/DL
PROTEIN URINE: ABNORMAL
RBC # BLD: 3.95 M/UL
RBC # FLD: 15 %
RED BLOOD CELLS URINE: 78 /HPF
SODIUM SERPL-SCNC: 140 MMOL/L
SPECIFIC GRAVITY URINE: 1.02
SQUAMOUS EPITHELIAL CELLS: 1 /HPF
TACROLIMUS SERPL-MCNC: 3.7 NG/ML
URATE SERPL-MCNC: 8.6 MG/DL
UROBILINOGEN URINE: NORMAL
WBC # FLD AUTO: 4.63 K/UL
WHITE BLOOD CELLS URINE: 594 /HPF

## 2021-10-07 LAB — CMV DNA SPEC QL NAA+PROBE: NOT DETECTED IU/ML

## 2021-10-08 LAB — BACTERIA UR CULT: ABNORMAL

## 2021-10-11 ENCOUNTER — APPOINTMENT (OUTPATIENT)
Dept: COLORECTAL SURGERY | Facility: CLINIC | Age: 71
End: 2021-10-11
Payer: MEDICARE

## 2021-10-11 VITALS
TEMPERATURE: 97.1 F | DIASTOLIC BLOOD PRESSURE: 65 MMHG | HEART RATE: 66 BPM | OXYGEN SATURATION: 99 % | SYSTOLIC BLOOD PRESSURE: 109 MMHG | RESPIRATION RATE: 12 BRPM

## 2021-10-11 PROCEDURE — 46600 DIAGNOSTIC ANOSCOPY SPX: CPT

## 2021-10-11 PROCEDURE — 99213 OFFICE O/P EST LOW 20 MIN: CPT | Mod: 25

## 2021-10-11 RX ORDER — SULFAMETHOXAZOLE AND TRIMETHOPRIM 400; 80 MG/1; MG/1
400-80 TABLET ORAL
Qty: 90 | Refills: 3 | Status: DISCONTINUED | COMMUNITY
Start: 2019-08-15 | End: 2021-10-11

## 2021-10-12 LAB
BKV DNA SPEC QL NAA+PROBE: NEGATIVE COPIES/ML
LOG 10 BK QUANTITATION PCR: NORMAL

## 2021-10-13 NOTE — HISTORY OF PRESENT ILLNESS
[FreeTextEntry1] : 72yo M pt presents with anal fissure for 3 years, complaining of sharp pain with BM and occasional bleeding. Pt has daily diarrhea BM and denies straining. Reports problems after temporary ileostomy due to SBO.\par Last colonoscopy February 2021.

## 2021-10-13 NOTE — PHYSICAL EXAM
[Normal Breath Sounds] : Normal breath sounds [Normal Heart Sounds] : normal heart sounds [Normal Rate and Rhythm] : normal rate and rhythm [Alert] : alert [Oriented to Person] : oriented to person [Oriented to Place] : oriented to place [Oriented to Time] : oriented to time [Calm] : calm [Abdomen Masses] : No abdominal masses [Abdomen Tenderness] : ~T No ~M abdominal tenderness [Tender] : nontender [Exam Deferred] : exam was deferred [Posterior] : posteriorly [None] : no anal fissures seen [Excoriation] : excoriations [Multiple Sinus Tracts] : no perianal sinus tracts [Fistula] : no fistulas [Wart] : no warts [Pilonidal Cyst] : no pilonidal cysts [Nonprolapsing] : a nonprolapsing (grade I) [Tender, Swollen] : nontender, non-swollen [Thrombosed] : that was not thrombosed [Skin Tags] : there were no residual hemorrhoidal skin tags seen [Normal] : was normal [Purpura] : no purpura  [Petechiae] : no petechiae [Skin Ulcer] : no ulcer [Skin Induration] : no induration [de-identified] : soft +BS NT/ND [de-identified] : circumferential skin irritation and changes , mild internal hemorrhoids [de-identified] : well nourished male [de-identified] : NC/AT [de-identified] : +ROM [de-identified] : intact

## 2021-10-13 NOTE — ASSESSMENT
[FreeTextEntry1] : 71-year-old man with a diagnosis of anal fissure for many years found to have circumferential excoriated skin as well as small posterior midline chronic fissure.

## 2021-10-15 ENCOUNTER — APPOINTMENT (OUTPATIENT)
Dept: UROLOGY | Facility: CLINIC | Age: 71
End: 2021-10-15

## 2021-10-15 ENCOUNTER — OUTPATIENT (OUTPATIENT)
Dept: OUTPATIENT SERVICES | Facility: HOSPITAL | Age: 71
LOS: 1 days | End: 2021-10-15
Payer: MEDICARE

## 2021-10-15 ENCOUNTER — APPOINTMENT (OUTPATIENT)
Dept: UROLOGY | Facility: AMBULATORY SURGERY CENTER | Age: 71
End: 2021-10-15

## 2021-10-15 VITALS
WEIGHT: 197.98 LBS | TEMPERATURE: 98 F | HEART RATE: 50 BPM | SYSTOLIC BLOOD PRESSURE: 131 MMHG | DIASTOLIC BLOOD PRESSURE: 72 MMHG | RESPIRATION RATE: 16 BRPM | HEIGHT: 70.5 IN | OXYGEN SATURATION: 96 %

## 2021-10-15 DIAGNOSIS — Z98.890 OTHER SPECIFIED POSTPROCEDURAL STATES: Chronic | ICD-10-CM

## 2021-10-15 DIAGNOSIS — Z90.5 ACQUIRED ABSENCE OF KIDNEY: Chronic | ICD-10-CM

## 2021-10-15 DIAGNOSIS — I77.0 ARTERIOVENOUS FISTULA, ACQUIRED: Chronic | ICD-10-CM

## 2021-10-15 DIAGNOSIS — Z01.818 ENCOUNTER FOR OTHER PREPROCEDURAL EXAMINATION: ICD-10-CM

## 2021-10-15 DIAGNOSIS — K62.89 OTHER SPECIFIED DISEASES OF ANUS AND RECTUM: ICD-10-CM

## 2021-10-15 DIAGNOSIS — Z94.0 KIDNEY TRANSPLANT STATUS: Chronic | ICD-10-CM

## 2021-10-15 LAB
ANION GAP SERPL CALC-SCNC: 13 MMOL/L — SIGNIFICANT CHANGE UP (ref 5–17)
BUN SERPL-MCNC: 38 MG/DL — HIGH (ref 7–23)
CALCIUM SERPL-MCNC: 10.8 MG/DL — HIGH (ref 8.4–10.5)
CHLORIDE SERPL-SCNC: 101 MMOL/L — SIGNIFICANT CHANGE UP (ref 96–108)
CO2 SERPL-SCNC: 21 MMOL/L — LOW (ref 22–31)
CREAT SERPL-MCNC: 2.88 MG/DL — HIGH (ref 0.5–1.3)
GLUCOSE SERPL-MCNC: 215 MG/DL — HIGH (ref 70–99)
HCT VFR BLD CALC: 36.7 % — LOW (ref 39–50)
HGB BLD-MCNC: 11.7 G/DL — LOW (ref 13–17)
MCHC RBC-ENTMCNC: 30.8 PG — SIGNIFICANT CHANGE UP (ref 27–34)
MCHC RBC-ENTMCNC: 31.9 GM/DL — LOW (ref 32–36)
MCV RBC AUTO: 96.6 FL — SIGNIFICANT CHANGE UP (ref 80–100)
NRBC # BLD: 0 /100 WBCS — SIGNIFICANT CHANGE UP (ref 0–0)
PLATELET # BLD AUTO: 153 K/UL — SIGNIFICANT CHANGE UP (ref 150–400)
POTASSIUM SERPL-MCNC: 5.8 MMOL/L — HIGH (ref 3.5–5.3)
POTASSIUM SERPL-SCNC: 5.8 MMOL/L — HIGH (ref 3.5–5.3)
RBC # BLD: 3.8 M/UL — LOW (ref 4.2–5.8)
RBC # FLD: 14.6 % — HIGH (ref 10.3–14.5)
SODIUM SERPL-SCNC: 135 MMOL/L — SIGNIFICANT CHANGE UP (ref 135–145)
WBC # BLD: 10.57 K/UL — HIGH (ref 3.8–10.5)
WBC # FLD AUTO: 10.57 K/UL — HIGH (ref 3.8–10.5)

## 2021-10-15 PROCEDURE — 85027 COMPLETE CBC AUTOMATED: CPT

## 2021-10-15 PROCEDURE — 36415 COLL VENOUS BLD VENIPUNCTURE: CPT

## 2021-10-15 PROCEDURE — 83036 HEMOGLOBIN GLYCOSYLATED A1C: CPT

## 2021-10-15 PROCEDURE — 80048 BASIC METABOLIC PNL TOTAL CA: CPT

## 2021-10-15 PROCEDURE — G0463: CPT

## 2021-10-15 RX ORDER — LIDOCAINE HCL 20 MG/ML
0.2 VIAL (ML) INJECTION ONCE
Refills: 0 | Status: DISCONTINUED | OUTPATIENT
Start: 2021-10-29 | End: 2021-11-12

## 2021-10-15 RX ORDER — SODIUM CHLORIDE 9 MG/ML
3 INJECTION INTRAMUSCULAR; INTRAVENOUS; SUBCUTANEOUS EVERY 8 HOURS
Refills: 0 | Status: DISCONTINUED | OUTPATIENT
Start: 2021-10-29 | End: 2021-11-12

## 2021-10-15 NOTE — H&P PST ADULT - PROBLEM SELECTOR PLAN 1
Rectal Examination under anesthesia  Botox injection  -cbc, bmp, A1c  -instructed to hold jaunuvia and glimepiride for 24 hours prior to procedure  -patient instructed to take immunosuppressant medications on day of procedure with sips of water  -fingerstick on admit

## 2021-10-15 NOTE — H&P PST ADULT - ACTIVITY
activity currently limited secondary to rectal pain, able to climb a flight of stairs, able to walk 2 blocks

## 2021-10-15 NOTE — H&P PST ADULT - HISTORY OF PRESENT ILLNESS
71 year old male with PMH of RCC, S/P bilateral nephrectomy in 2015, ESRD was on HD for 2015-->2018, S/P Renal Transplant 2018, HTN, T2DM, Prostate Cancer on Orgovyx,, prior SBO, S/P temporary Ileosteomy, Gout, BPH, Chronic anal fissures with complaint of rectal pain. Patient reports that he has had anal fissures for 3 years. He reports sharp pain in rectum with BM's and occasional rectal bleeding. He denies fever, chills,   He presents to PST for evaluation prior to scheduled Rectal examination under anesthesia, Botox injection on 10/29/2021.    preop covid screen 10/26 at 12:30

## 2021-10-15 NOTE — H&P PST ADULT - MUSCULOSKELETAL
ROM intact/no joint swelling/no joint erythema/no joint warmth/no calf tenderness negative detailed exam

## 2021-10-15 NOTE — H&P PST ADULT - NSICDXPASTMEDICALHX_GEN_ALL_CORE_FT
PAST MEDICAL HISTORY:  Anal fissure dx: ' 2018   No surgery    Benign prostatic hypertrophy     Bowel obstruction ' 2017   surgically treated w/ ileostomy; since reversed    ESRD (end stage renal disease) On Dialysis ' 2015 to 7/2018    Hepatitis C In Donor Kidney: patient received treatment for Hep. C : post treatment: patient  tested Negative for Hep C    HTN (hypertension)     Medial meniscus tear ' 90's  Right    Prostate cancer on orgovyx    Renal cell carcinoma s/p b/l nephrectomy and renal transplant in 2018    Type 2 diabetes mellitus dx: '88    Ureteral stricture of kidney transplant 2018

## 2021-10-15 NOTE — H&P PST ADULT - NSICDXPASTSURGICALHX_GEN_ALL_CORE_FT
PAST SURGICAL HISTORY:  AV fistula 2013/ left forearm    H/O colonoscopy     H/O ileostomy '    for 3 months. s/p Reversal    Kidney transplant recipient 2018  @ [facility] :  +  Hep C Donor    S/p nephrectomy left 9/15/2015   + Cancer    S/p nephrectomy right 12/10/2015   benign    S/P right knee arthroscopy

## 2021-10-16 LAB
A1C WITH ESTIMATED AVERAGE GLUCOSE RESULT: 6.4 % — HIGH (ref 4–5.6)
ESTIMATED AVERAGE GLUCOSE: 137 MG/DL — HIGH (ref 68–114)

## 2021-10-20 ENCOUNTER — APPOINTMENT (OUTPATIENT)
Dept: RADIATION ONCOLOGY | Facility: CLINIC | Age: 71
End: 2021-10-20
Payer: MEDICARE

## 2021-10-20 VITALS
RESPIRATION RATE: 16 BRPM | BODY MASS INDEX: 28.94 KG/M2 | SYSTOLIC BLOOD PRESSURE: 153 MMHG | WEIGHT: 201.72 LBS | TEMPERATURE: 95.5 F | HEART RATE: 74 BPM | OXYGEN SATURATION: 98 % | DIASTOLIC BLOOD PRESSURE: 69 MMHG

## 2021-10-20 PROCEDURE — 99024 POSTOP FOLLOW-UP VISIT: CPT

## 2021-10-20 RX ORDER — DIAZEPAM 5 MG/1
5 TABLET ORAL
Qty: 1 | Refills: 0 | Status: DISCONTINUED | COMMUNITY
Start: 2021-06-09 | End: 2021-10-20

## 2021-10-20 RX ORDER — SILDENAFIL 100 MG/1
100 TABLET, FILM COATED ORAL
Qty: 6 | Refills: 11 | Status: DISCONTINUED | COMMUNITY
Start: 2020-12-07 | End: 2021-10-20

## 2021-10-20 NOTE — DISEASE MANAGEMENT
[Clinical] : TNM Stage: c [II] : II [TTNM] : 1c [NTNM] : 0 [MTNM] : 0 [de-identified] : 7000 cGy  [de-identified] : prostate

## 2021-10-20 NOTE — REVIEW OF SYSTEMS
[Diarrhea: Grade 1 - Increase of <4 stools per day over baseline; mild increase in ostomy output compared to baseline] : Diarrhea: Grade 1 - Increase of <4 stools per day over baseline; mild increase in ostomy output compared to baseline [Hematuria: Grade 0] : Hematuria: Grade 0 [Urinary Tract Pain: Grade 0] : Urinary Tract Pain: Grade 0 [Fatigue: Grade 1 - Fatigue relieved by rest] : Fatigue: Grade 1 - Fatigue relieved by rest [Rectal Pain: Grade 2 - Moderate pain; limiting instrumental ADL] : Rectal Pain: Grade 2 - Moderate pain; limiting instrumental ADL [Erectile Dysfunction: Grade 2 - Decrease in erectile function (frequency/rigidity of erections), erectile intervention indicated, (e.g., medication or mechanical devices such as penile pump)] : Erectile Dysfunction: Grade 2 - Decrease in erectile function (frequency/rigidity of erections), erectile intervention indicated, (e.g., medication or mechanical devices such as penile pump) [Ejaculation Disorder: Grade 2 - Anejaculation or retrograde ejaculation] : Ejaculation Disorder: Grade 2 - Anejaculation or retrograde ejaculation

## 2021-10-20 NOTE — VITALS
[Maximal Pain Intensity: 5/10] : 5/10 [Pain Location: ___] : Pain Location: [unfilled] [NSAID/Non-Opioid] : NSAID/Non-Opioid [ECOG Performance Status: 1 - Restricted in physically strenuous activity but ambulatory and able to carry out work of a light or sedentary nature] : Performance Status: 1 - Restricted in physically strenuous activity but ambulatory and able to carry out work of a light or sedentary nature, e.g., light house work, office work

## 2021-10-20 NOTE — HISTORY OF PRESENT ILLNESS
[FreeTextEntry1] : 71 y/o male with h/o kidney transplant (bilateral nephrectomy) in 2018, HTN, DM2 with newly diagnosed prostate cancer \par \par Pathology in 3/2021 showed [place] 4+3 in 2 cores with 80% of tissue involvement, Mauricio 3+4 in 4 cores with 60% involvement, [place] 3+3 in 2 cores with 10% involvement. Total 14 biopsy cores, with 7 cores positive with cancer.  PSA 5.27 in 12/2020, PSA 4.29 in 10/2020, PSA 3.41 in 5/2018\par \par Treatment Summary: Dr Calderon\par Treatment Site: Prostate/SV   7,000 cGy from 7/09/2021 -8/27/2021 with ADT\par Clinical Response: Tolerated the treatment well.  \par \par Today: Fatigue and rectal pain 5-9/10 worse with defecation partially relieved with topical lidocaine. He will be admitted for surgery to rectal fissure next week. Soft to liquid BMs 2-4 x night. Denies hematuria. Has occasional bowel incontinence. Has erectile dysfunction. \par \par \par \par

## 2021-10-22 ENCOUNTER — OUTPATIENT (OUTPATIENT)
Dept: OUTPATIENT SERVICES | Facility: HOSPITAL | Age: 71
LOS: 1 days | End: 2021-10-22

## 2021-10-22 VITALS
RESPIRATION RATE: 16 BRPM | TEMPERATURE: 97 F | HEART RATE: 70 BPM | HEIGHT: 69 IN | SYSTOLIC BLOOD PRESSURE: 125 MMHG | OXYGEN SATURATION: 99 % | WEIGHT: 197.98 LBS | DIASTOLIC BLOOD PRESSURE: 60 MMHG

## 2021-10-22 DIAGNOSIS — I10 ESSENTIAL (PRIMARY) HYPERTENSION: ICD-10-CM

## 2021-10-22 DIAGNOSIS — E11.9 TYPE 2 DIABETES MELLITUS WITHOUT COMPLICATIONS: ICD-10-CM

## 2021-10-22 DIAGNOSIS — Z94.0 KIDNEY TRANSPLANT STATUS: ICD-10-CM

## 2021-10-22 DIAGNOSIS — C61 MALIGNANT NEOPLASM OF PROSTATE: ICD-10-CM

## 2021-10-22 DIAGNOSIS — Z90.5 ACQUIRED ABSENCE OF KIDNEY: Chronic | ICD-10-CM

## 2021-10-22 DIAGNOSIS — Z91.89 OTHER SPECIFIED PERSONAL RISK FACTORS, NOT ELSEWHERE CLASSIFIED: ICD-10-CM

## 2021-10-22 DIAGNOSIS — Z98.890 OTHER SPECIFIED POSTPROCEDURAL STATES: Chronic | ICD-10-CM

## 2021-10-22 DIAGNOSIS — Z94.0 KIDNEY TRANSPLANT STATUS: Chronic | ICD-10-CM

## 2021-10-22 DIAGNOSIS — I77.0 ARTERIOVENOUS FISTULA, ACQUIRED: Chronic | ICD-10-CM

## 2021-10-22 LAB
ALBUMIN SERPL ELPH-MCNC: 4.2 G/DL — SIGNIFICANT CHANGE UP (ref 3.3–5)
ALP SERPL-CCNC: 80 U/L — SIGNIFICANT CHANGE UP (ref 40–120)
ALT FLD-CCNC: 18 U/L — SIGNIFICANT CHANGE UP (ref 4–41)
ANION GAP SERPL CALC-SCNC: 17 MMOL/L — HIGH (ref 7–14)
AST SERPL-CCNC: 30 U/L — SIGNIFICANT CHANGE UP (ref 4–40)
BILIRUB SERPL-MCNC: 0.2 MG/DL — SIGNIFICANT CHANGE UP (ref 0.2–1.2)
BUN SERPL-MCNC: 39 MG/DL — HIGH (ref 7–23)
CALCIUM SERPL-MCNC: 10.6 MG/DL — HIGH (ref 8.4–10.5)
CHLORIDE SERPL-SCNC: 101 MMOL/L — SIGNIFICANT CHANGE UP (ref 98–107)
CO2 SERPL-SCNC: 18 MMOL/L — LOW (ref 22–31)
CREAT SERPL-MCNC: 2.69 MG/DL — HIGH (ref 0.5–1.3)
GLUCOSE SERPL-MCNC: 182 MG/DL — HIGH (ref 70–99)
POTASSIUM SERPL-MCNC: 5.1 MMOL/L — SIGNIFICANT CHANGE UP (ref 3.5–5.3)
POTASSIUM SERPL-SCNC: 5.1 MMOL/L — SIGNIFICANT CHANGE UP (ref 3.5–5.3)
PROT SERPL-MCNC: 7.1 G/DL — SIGNIFICANT CHANGE UP (ref 6–8.3)
PSA SERPL-MCNC: 0.01 NG/ML
SODIUM SERPL-SCNC: 136 MMOL/L — SIGNIFICANT CHANGE UP (ref 135–145)

## 2021-10-22 RX ORDER — FINASTERIDE 5 MG/1
1 TABLET, FILM COATED ORAL
Qty: 0 | Refills: 0 | DISCHARGE

## 2021-10-22 RX ORDER — NIFEDIPINE 30 MG
2 TABLET, EXTENDED RELEASE 24 HR ORAL
Qty: 0 | Refills: 0 | DISCHARGE

## 2021-10-22 RX ORDER — SITAGLIPTIN 50 MG/1
1 TABLET, FILM COATED ORAL
Qty: 0 | Refills: 0 | DISCHARGE

## 2021-10-22 RX ORDER — CITRIC ACID/SODIUM CITRATE 300-500 MG
15 SOLUTION, ORAL ORAL
Qty: 0 | Refills: 0 | DISCHARGE

## 2021-10-22 RX ORDER — ALLOPURINOL 300 MG
1 TABLET ORAL
Qty: 0 | Refills: 0 | DISCHARGE

## 2021-10-22 RX ORDER — TACROLIMUS 5 MG/1
4.5 CAPSULE ORAL
Qty: 0 | Refills: 0 | DISCHARGE

## 2021-10-22 RX ORDER — ERGOCALCIFEROL 1.25 MG/1
1 CAPSULE ORAL
Qty: 0 | Refills: 0 | DISCHARGE

## 2021-10-22 RX ORDER — TACROLIMUS 5 MG/1
5 CAPSULE ORAL
Qty: 0 | Refills: 0 | DISCHARGE

## 2021-10-22 RX ORDER — VELPATASVIR AND SOFOSBUVIR 37.5; 15 MG/1; MG/1
1 PELLET ORAL
Qty: 0 | Refills: 0 | DISCHARGE

## 2021-10-22 RX ORDER — TAMSULOSIN HYDROCHLORIDE 0.4 MG/1
2 CAPSULE ORAL
Qty: 0 | Refills: 0 | DISCHARGE

## 2021-10-22 RX ORDER — MAGNESIUM OXIDE 400 MG ORAL TABLET 241.3 MG
1 TABLET ORAL
Qty: 0 | Refills: 0 | DISCHARGE

## 2021-10-22 RX ORDER — GLIMEPIRIDE 1 MG
1 TABLET ORAL
Qty: 0 | Refills: 0 | DISCHARGE

## 2021-10-22 RX ORDER — DIPHENOXYLATE HCL/ATROPINE 2.5-.025MG
1 TABLET ORAL
Qty: 0 | Refills: 0 | DISCHARGE

## 2021-10-22 RX ORDER — CALCIUM CARBONATE 500(1250)
1 TABLET ORAL
Qty: 0 | Refills: 0 | DISCHARGE

## 2021-10-22 RX ORDER — SODIUM CITRATE DIHYDRATE 230 MG/1
15 TABLET, CHEWABLE ORAL
Qty: 0 | Refills: 0 | DISCHARGE

## 2021-10-22 RX ORDER — RELUGOLIX 120 MG/1
1 TABLET, FILM COATED ORAL
Qty: 0 | Refills: 0 | DISCHARGE

## 2021-10-22 RX ORDER — METOPROLOL TARTRATE 50 MG
1 TABLET ORAL
Qty: 0 | Refills: 0 | DISCHARGE

## 2021-10-22 RX ORDER — GABAPENTIN 400 MG/1
1 CAPSULE ORAL
Qty: 0 | Refills: 0 | DISCHARGE

## 2021-10-22 NOTE — H&P PST ADULT - NEGATIVE CARDIOVASCULAR SYMPTOMS
no chest pain/no palpitations/no dyspnea on exertion/no orthopnea/no paroxysmal nocturnal dyspnea no chest pain/no palpitations/no dyspnea on exertion/no orthopnea/no paroxysmal nocturnal dyspnea/no peripheral edema/no claudication

## 2021-10-22 NOTE — H&P PST ADULT - ACTIVITY
activity currently limited secondary to rectal pain, able to climb a flight of stairs, able to walk 2 blocks does own personal crae

## 2021-10-22 NOTE — H&P PST ADULT - GASTROINTESTINAL DETAILS
soft/nontender/no distention/no masses palpable/bowel sounds normal soft/nontender/no distention/no masses palpable/bowel sounds normal/no rebound tenderness

## 2021-10-22 NOTE — H&P PST ADULT - MUSCULOSKELETAL
negative details… No joint pain, swelling or deformity; no limitation of movement detailed exam no joint swelling/no joint erythema

## 2021-10-22 NOTE — H&P PST ADULT - PROBLEM SELECTOR PLAN 3
Pt instructed to take Atenolol, Nifedipine with a sip of water on day of surgery. Pt verbalized understanding.

## 2021-10-22 NOTE — H&P PST ADULT - NSICDXPASTMEDICALHX_GEN_ALL_CORE_FT
PAST MEDICAL HISTORY:  Anal fissure dx: ' 2018   No surgery    Benign prostatic hypertrophy     Bowel obstruction ' 2017   surgically treated w/ ileostomy; since reversed    ESRD (end stage renal disease) On Dialysis ' 2015 to 7/2018    Hepatitis C In Donor Kidney: patient received treatment for Hep. C : post treatment: patient  tested Negative for Hep C    HTN (hypertension)     Medial meniscus tear ' 90's  Right    Prostate cancer on orgovyx    Renal cell carcinoma s/p b/l nephrectomy and renal transplant in 2018    Type 2 diabetes mellitus dx: '88    Ureteral stricture of kidney transplant 2018     PAST MEDICAL HISTORY:  Anal fissure dx: ' 2018   No surgery    Benign prostatic hypertrophy     Bowel obstruction ' 2017   surgically treated w/ ileostomy; since reversed    ESRD (end stage renal disease) On Dialysis ' 2015 to 7/2018    Hepatitis C In Donor Kidney: patient received treatment for Hep. C : post treatment: patient  tested Negative for Hep C    HTN (hypertension)     Medial meniscus tear ' 90's  Right    Prostate cancer     Renal cell carcinoma s/p b/l nephrectomy and renal transplant in 2018    Type 2 diabetes mellitus dx: '88    Ureteral stricture of kidney transplant 2018

## 2021-10-22 NOTE — H&P PST ADULT - LAB RESULTS AND INTERPRETATION
CMP done at [facility]  Urine culture done on 10/05/2021 - results in chart. As per Awa (urology dept). C/S not required at [facility]

## 2021-10-22 NOTE — H&P PST ADULT - NSICDXPASTSURGICALHX_GEN_ALL_CORE_FT
PAST SURGICAL HISTORY:  AV fistula 2013/ left forearm    H/O colonoscopy     H/O ileostomy '    for 3 months. s/p Reversal    Kidney transplant recipient 2018  @ [facility] :  +  Hep C Donor    S/p nephrectomy left 9/15/2015   + Cancer    S/p nephrectomy right 12/10/2015   benign    S/P right knee arthroscopy

## 2021-10-22 NOTE — H&P PST ADULT - NEGATIVE RESPIRATORY AND THORAX SYMPTOMS
no wheezing/no dyspnea/no cough/no hemoptysis no wheezing/no dyspnea/no cough/no hemoptysis/no pleuritic chest pain

## 2021-10-22 NOTE — H&P PST ADULT - HISTORY OF PRESENT ILLNESS
71 year old male with PMH of RCC, S/P bilateral nephrectomy in 2015, ESRD was on HD for 2015-->2018, S/P Renal Transplant 2018, HTN, T2DM, Prostate Cancer on Orgovyx,, prior SBO, S/P temporary Ileosteomy, Gout, BPH,     Chronic anal fissures with complaint of rectal pain. Patient reports that he has had anal fissures for 3 years. He reports sharp pain in rectum with BM's and occasional rectal bleeding. He denies fever, chills,   He presents to PST for evaluation prior to scheduled Rectal examination under anesthesia, Botox injection on 10/29/2021.     71 year old male with PMH of RCC, S/P bilateral nephrectomy in 2015, ESRD was on HD for 2015-->2018, S/P Renal Transplant 2018, HTN, T2DM, Prostate Cancer, Gout, BPH, Chronic anal fissures with complaint of rectal pain, pt presents to PST for pre op evaluation in preparation for cystoscopy 71 year old male with PMH of RCC, S/P bilateral nephrectomy in 2015, ESRD was on HD for 2015-->2018, S/P Renal Transplant 2018, HTN, T2DM, Prostate Cancer, Gout, BPH, Chronic anal fissures with complaint of rectal pain, pt presents to PST for pre op evaluation in preparation for cystoscopy bipolar transurethral resection of the prostate

## 2021-10-22 NOTE — H&P PST ADULT - PROBLEM SELECTOR PLAN 1
Schedule for cystoscopy, bipolar transurethral resection of the prostate tentatively on 11/02/2021. Pre op instructions, given and explained. Pt verbalized understanding.  Pt confirmed schedule appt for Covid test pre op

## 2021-10-22 NOTE — H&P PST ADULT - NEGATIVE GASTROINTESTINAL SYMPTOMS
no nausea/no vomiting/no diarrhea/no constipation/no change in bowel habits/no flatulence/no abdominal pain/no melena/no hematochezia no nausea/no vomiting/no change in bowel habits/no flatulence/no abdominal pain/no melena/no hematochezia/no jaundice/no hiccoughs

## 2021-10-26 ENCOUNTER — APPOINTMENT (OUTPATIENT)
Dept: DISASTER EMERGENCY | Facility: CLINIC | Age: 71
End: 2021-10-26

## 2021-10-27 LAB — SARS-COV-2 N GENE NPH QL NAA+PROBE: NOT DETECTED

## 2021-10-28 ENCOUNTER — TRANSCRIPTION ENCOUNTER (OUTPATIENT)
Age: 71
End: 2021-10-28

## 2021-10-28 ENCOUNTER — OUTPATIENT (OUTPATIENT)
Dept: OUTPATIENT SERVICES | Facility: HOSPITAL | Age: 71
LOS: 1 days | End: 2021-10-28

## 2021-10-28 DIAGNOSIS — Z98.890 OTHER SPECIFIED POSTPROCEDURAL STATES: Chronic | ICD-10-CM

## 2021-10-28 DIAGNOSIS — Z94.0 KIDNEY TRANSPLANT STATUS: Chronic | ICD-10-CM

## 2021-10-28 DIAGNOSIS — I77.0 ARTERIOVENOUS FISTULA, ACQUIRED: Chronic | ICD-10-CM

## 2021-10-28 DIAGNOSIS — Z90.5 ACQUIRED ABSENCE OF KIDNEY: Chronic | ICD-10-CM

## 2021-10-28 DIAGNOSIS — C61 MALIGNANT NEOPLASM OF PROSTATE: ICD-10-CM

## 2021-10-29 ENCOUNTER — APPOINTMENT (OUTPATIENT)
Dept: COLORECTAL SURGERY | Facility: HOSPITAL | Age: 71
End: 2021-10-29
Payer: MEDICARE

## 2021-10-29 ENCOUNTER — RESULT REVIEW (OUTPATIENT)
Age: 71
End: 2021-10-29

## 2021-10-29 ENCOUNTER — OUTPATIENT (OUTPATIENT)
Dept: OUTPATIENT SERVICES | Facility: HOSPITAL | Age: 71
LOS: 1 days | End: 2021-10-29
Payer: MEDICARE

## 2021-10-29 VITALS
WEIGHT: 197.98 LBS | RESPIRATION RATE: 16 BRPM | HEART RATE: 68 BPM | DIASTOLIC BLOOD PRESSURE: 67 MMHG | OXYGEN SATURATION: 98 % | HEIGHT: 69 IN | SYSTOLIC BLOOD PRESSURE: 130 MMHG | TEMPERATURE: 97 F

## 2021-10-29 VITALS
SYSTOLIC BLOOD PRESSURE: 135 MMHG | HEART RATE: 78 BPM | TEMPERATURE: 99 F | OXYGEN SATURATION: 100 % | RESPIRATION RATE: 15 BRPM | DIASTOLIC BLOOD PRESSURE: 66 MMHG

## 2021-10-29 DIAGNOSIS — Z90.5 ACQUIRED ABSENCE OF KIDNEY: Chronic | ICD-10-CM

## 2021-10-29 DIAGNOSIS — I77.0 ARTERIOVENOUS FISTULA, ACQUIRED: Chronic | ICD-10-CM

## 2021-10-29 DIAGNOSIS — Z98.890 OTHER SPECIFIED POSTPROCEDURAL STATES: Chronic | ICD-10-CM

## 2021-10-29 DIAGNOSIS — K62.89 OTHER SPECIFIED DISEASES OF ANUS AND RECTUM: ICD-10-CM

## 2021-10-29 DIAGNOSIS — Z94.0 KIDNEY TRANSPLANT STATUS: Chronic | ICD-10-CM

## 2021-10-29 LAB — GLUCOSE BLDC GLUCOMTR-MCNC: 114 MG/DL — HIGH (ref 70–99)

## 2021-10-29 PROCEDURE — 46505 CHEMODENERVATION ANAL MUSC: CPT

## 2021-10-29 PROCEDURE — 46922 EXCISION OF ANAL LESION(S): CPT

## 2021-10-29 PROCEDURE — 46200 REMOVAL OF ANAL FISSURE: CPT

## 2021-10-29 PROCEDURE — 88305 TISSUE EXAM BY PATHOLOGIST: CPT | Mod: 26

## 2021-10-29 PROCEDURE — C1889: CPT

## 2021-10-29 PROCEDURE — 88305 TISSUE EXAM BY PATHOLOGIST: CPT

## 2021-10-29 PROCEDURE — 46910 DESTRUCTION ANAL LESION(S): CPT

## 2021-10-29 PROCEDURE — 82962 GLUCOSE BLOOD TEST: CPT

## 2021-10-29 RX ORDER — TACROLIMUS 5 MG/1
1 CAPSULE ORAL
Qty: 0 | Refills: 0 | DISCHARGE

## 2021-10-29 RX ORDER — NYSTATIN 500MM UNIT
1 POWDER (EA) MISCELLANEOUS
Qty: 14 | Refills: 0
Start: 2021-10-29 | End: 2021-11-04

## 2021-10-29 RX ORDER — OXYCODONE HYDROCHLORIDE 5 MG/1
1 TABLET ORAL
Qty: 10 | Refills: 0
Start: 2021-10-29

## 2021-10-29 NOTE — ASU PATIENT PROFILE, ADULT - NSICDXPASTSURGICALHX_GEN_ALL_CORE_FT
PAST SURGICAL HISTORY:  AV fistula 2013/ left forearm    H/O colonoscopy     H/O ileostomy '    for 3 months. s/p Reversal    Kidney transplant recipient 2018  @ [facility] :  +  Hep C Donor    S/p nephrectomy left 9/15/2015   + Cancer    S/p nephrectomy right 12/10/2015   benign    S/P right knee arthroscopy

## 2021-10-29 NOTE — BRIEF OPERATIVE NOTE - OPERATION/FINDINGS
Exam under anesthesia revealing pale taught skin of the anal verge without ulceration or obvious fissure. 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 Small posterior lesion excised and sent for pathology. Remainder fulgurated. Posterior internal sphincter botox injection. Hemostasis achieved.

## 2021-10-29 NOTE — ASU DISCHARGE PLAN (ADULT/PEDIATRIC) - CARE PROVIDER_API CALL
Tony Kumar)  ColonRectal Surgery; Surgery  [address], [address]  [place] [address]  Phone: [phone]  Fax: [phone]  Established Patient  Follow Up Time: 2 weeks

## 2021-10-29 NOTE — ASU PATIENT PROFILE, ADULT - NSICDXPASTMEDICALHX_GEN_ALL_CORE_FT
PAST MEDICAL HISTORY:  Anal fissure dx: ' 2018   No surgery    Benign prostatic hypertrophy     Bowel obstruction ' 2017   surgically treated w/ ileostomy; since reversed    ESRD (end stage renal disease) On Dialysis ' 2015 to 7/2018    Hepatitis C In Donor Kidney: patient received treatment for Hep. C : post treatment: patient  tested Negative for Hep C    HTN (hypertension)     Medial meniscus tear ' 90's  Right    Prostate cancer     Renal cell carcinoma s/p b/l nephrectomy and renal transplant in 2018    Type 2 diabetes mellitus dx: '88    Ureteral stricture of kidney transplant 2018

## 2021-10-30 ENCOUNTER — APPOINTMENT (OUTPATIENT)
Dept: DISASTER EMERGENCY | Facility: CLINIC | Age: 71
End: 2021-10-30

## 2021-11-01 ENCOUNTER — APPOINTMENT (OUTPATIENT)
Dept: DISASTER EMERGENCY | Facility: CLINIC | Age: 71
End: 2021-11-01

## 2021-11-02 ENCOUNTER — APPOINTMENT (OUTPATIENT)
Dept: UROLOGY | Facility: HOSPITAL | Age: 71
End: 2021-11-02

## 2021-11-02 LAB — SARS-COV-2 N GENE NPH QL NAA+PROBE: NOT DETECTED

## 2021-11-03 ENCOUNTER — TRANSCRIPTION ENCOUNTER (OUTPATIENT)
Age: 71
End: 2021-11-03

## 2021-11-03 ENCOUNTER — APPOINTMENT (OUTPATIENT)
Dept: DISASTER EMERGENCY | Facility: CLINIC | Age: 71
End: 2021-11-03

## 2021-11-03 ENCOUNTER — LABORATORY RESULT (OUTPATIENT)
Age: 71
End: 2021-11-03

## 2021-11-03 LAB — SURGICAL PATHOLOGY STUDY: SIGNIFICANT CHANGE UP

## 2021-11-05 ENCOUNTER — INPATIENT (INPATIENT)
Facility: HOSPITAL | Age: 71
LOS: 3 days | Discharge: HOME | End: 2021-11-09
Attending: UROLOGY | Admitting: UROLOGY
Payer: MEDICARE

## 2021-11-05 VITALS
TEMPERATURE: 97 F | HEART RATE: 87 BPM | SYSTOLIC BLOOD PRESSURE: 134 MMHG | RESPIRATION RATE: 18 BRPM | DIASTOLIC BLOOD PRESSURE: 66 MMHG | OXYGEN SATURATION: 100 %

## 2021-11-05 DIAGNOSIS — K21.9 GASTRO-ESOPHAGEAL REFLUX DISEASE WITHOUT ESOPHAGITIS: ICD-10-CM

## 2021-11-05 DIAGNOSIS — Z90.5 ACQUIRED ABSENCE OF KIDNEY: Chronic | ICD-10-CM

## 2021-11-05 DIAGNOSIS — N40.1 BENIGN PROSTATIC HYPERPLASIA WITH LOWER URINARY TRACT SYMPTOMS: ICD-10-CM

## 2021-11-05 DIAGNOSIS — Z87.440 PERSONAL HISTORY OF URINARY (TRACT) INFECTIONS: ICD-10-CM

## 2021-11-05 DIAGNOSIS — I10 ESSENTIAL (PRIMARY) HYPERTENSION: ICD-10-CM

## 2021-11-05 DIAGNOSIS — Z98.890 OTHER SPECIFIED POSTPROCEDURAL STATES: Chronic | ICD-10-CM

## 2021-11-05 DIAGNOSIS — M10.9 GOUT, UNSPECIFIED: ICD-10-CM

## 2021-11-05 DIAGNOSIS — Z94.0 KIDNEY TRANSPLANT STATUS: Chronic | ICD-10-CM

## 2021-11-05 DIAGNOSIS — E11.9 TYPE 2 DIABETES MELLITUS WITHOUT COMPLICATIONS: ICD-10-CM

## 2021-11-05 DIAGNOSIS — R33.8 OTHER RETENTION OF URINE: ICD-10-CM

## 2021-11-05 DIAGNOSIS — I77.0 ARTERIOVENOUS FISTULA, ACQUIRED: Chronic | ICD-10-CM

## 2021-11-05 DIAGNOSIS — Z94.0 KIDNEY TRANSPLANT STATUS: ICD-10-CM

## 2021-11-05 LAB
A1C WITH ESTIMATED AVERAGE GLUCOSE RESULT: 6.8 % — HIGH (ref 4–5.6)
ANION GAP SERPL CALC-SCNC: 10 MMOL/L — SIGNIFICANT CHANGE UP (ref 7–14)
BUN SERPL-MCNC: 36 MG/DL — HIGH (ref 7–23)
CALCIUM SERPL-MCNC: 10.1 MG/DL — SIGNIFICANT CHANGE UP (ref 8.4–10.5)
CHLORIDE SERPL-SCNC: 105 MMOL/L — SIGNIFICANT CHANGE UP (ref 98–107)
CO2 SERPL-SCNC: 22 MMOL/L — SIGNIFICANT CHANGE UP (ref 22–31)
CREAT SERPL-MCNC: 2.79 MG/DL — HIGH (ref 0.5–1.3)
ESTIMATED AVERAGE GLUCOSE: 148 — SIGNIFICANT CHANGE UP
GLUCOSE BLDC GLUCOMTR-MCNC: 114 MG/DL — HIGH (ref 70–99)
GLUCOSE BLDC GLUCOMTR-MCNC: 119 MG/DL — HIGH (ref 70–99)
GLUCOSE BLDC GLUCOMTR-MCNC: 140 MG/DL — HIGH (ref 70–99)
GLUCOSE BLDC GLUCOMTR-MCNC: 173 MG/DL — HIGH (ref 70–99)
GLUCOSE SERPL-MCNC: 122 MG/DL — HIGH (ref 70–99)
HCT VFR BLD CALC: 34.4 % — LOW (ref 39–50)
HGB BLD-MCNC: 10.8 G/DL — LOW (ref 13–17)
MAGNESIUM SERPL-MCNC: 1.6 MG/DL — SIGNIFICANT CHANGE UP (ref 1.6–2.6)
MCHC RBC-ENTMCNC: 30.8 PG — SIGNIFICANT CHANGE UP (ref 27–34)
MCHC RBC-ENTMCNC: 31.4 GM/DL — LOW (ref 32–36)
MCV RBC AUTO: 98 FL — SIGNIFICANT CHANGE UP (ref 80–100)
NRBC # BLD: 0 /100 WBCS — SIGNIFICANT CHANGE UP
NRBC # FLD: 0 K/UL — SIGNIFICANT CHANGE UP
PHOSPHATE SERPL-MCNC: 3 MG/DL — SIGNIFICANT CHANGE UP (ref 2.5–4.5)
PLATELET # BLD AUTO: 71 K/UL — LOW (ref 150–400)
POTASSIUM SERPL-MCNC: 5.7 MMOL/L — HIGH (ref 3.5–5.3)
POTASSIUM SERPL-SCNC: 5.7 MMOL/L — HIGH (ref 3.5–5.3)
RBC # BLD: 3.51 M/UL — LOW (ref 4.2–5.8)
RBC # FLD: 14.5 % — SIGNIFICANT CHANGE UP (ref 10.3–14.5)
SODIUM SERPL-SCNC: 137 MMOL/L — SIGNIFICANT CHANGE UP (ref 135–145)
WBC # BLD: 5.4 K/UL — SIGNIFICANT CHANGE UP (ref 3.8–10.5)
WBC # FLD AUTO: 5.4 K/UL — SIGNIFICANT CHANGE UP (ref 3.8–10.5)

## 2021-11-05 PROCEDURE — 93010 ELECTROCARDIOGRAM REPORT: CPT

## 2021-11-05 PROCEDURE — 99223 1ST HOSP IP/OBS HIGH 75: CPT

## 2021-11-05 RX ORDER — GLUCAGON INJECTION, SOLUTION 0.5 MG/.1ML
1 INJECTION, SOLUTION SUBCUTANEOUS ONCE
Refills: 0 | Status: DISCONTINUED | OUTPATIENT
Start: 2021-11-05 | End: 2021-11-08

## 2021-11-05 RX ORDER — ERTAPENEM SODIUM 1 G/1
500 INJECTION, POWDER, LYOPHILIZED, FOR SOLUTION INTRAMUSCULAR; INTRAVENOUS EVERY 24 HOURS
Refills: 0 | Status: DISCONTINUED | OUTPATIENT
Start: 2021-11-06 | End: 2021-11-09

## 2021-11-05 RX ORDER — DEXTROSE 50 % IN WATER 50 %
25 SYRINGE (ML) INTRAVENOUS ONCE
Refills: 0 | Status: DISCONTINUED | OUTPATIENT
Start: 2021-11-05 | End: 2021-11-08

## 2021-11-05 RX ORDER — MAGNESIUM OXIDE 400 MG ORAL TABLET 241.3 MG
400 TABLET ORAL DAILY
Refills: 0 | Status: DISCONTINUED | OUTPATIENT
Start: 2021-11-05 | End: 2021-11-09

## 2021-11-05 RX ORDER — FAMOTIDINE 10 MG/ML
20 INJECTION INTRAVENOUS DAILY
Refills: 0 | Status: DISCONTINUED | OUTPATIENT
Start: 2021-11-05 | End: 2021-11-07

## 2021-11-05 RX ORDER — ALLOPURINOL 300 MG
100 TABLET ORAL DAILY
Refills: 0 | Status: DISCONTINUED | OUTPATIENT
Start: 2021-11-05 | End: 2021-11-09

## 2021-11-05 RX ORDER — TACROLIMUS 5 MG/1
5 CAPSULE ORAL DAILY
Refills: 0 | Status: DISCONTINUED | OUTPATIENT
Start: 2021-11-05 | End: 2021-11-09

## 2021-11-05 RX ORDER — HEPARIN SODIUM 5000 [USP'U]/ML
5000 INJECTION INTRAVENOUS; SUBCUTANEOUS EVERY 8 HOURS
Refills: 0 | Status: DISCONTINUED | OUTPATIENT
Start: 2021-11-05 | End: 2021-11-09

## 2021-11-05 RX ORDER — INSULIN LISPRO 100/ML
VIAL (ML) SUBCUTANEOUS
Refills: 0 | Status: DISCONTINUED | OUTPATIENT
Start: 2021-11-05 | End: 2021-11-08

## 2021-11-05 RX ORDER — MYCOPHENOLATE MOFETIL 250 MG/1
250 CAPSULE ORAL
Refills: 0 | Status: DISCONTINUED | OUTPATIENT
Start: 2021-11-05 | End: 2021-11-09

## 2021-11-05 RX ORDER — SODIUM CHLORIDE 9 MG/ML
1000 INJECTION, SOLUTION INTRAVENOUS
Refills: 0 | Status: DISCONTINUED | OUTPATIENT
Start: 2021-11-05 | End: 2021-11-08

## 2021-11-05 RX ORDER — DEXTROSE 50 % IN WATER 50 %
15 SYRINGE (ML) INTRAVENOUS ONCE
Refills: 0 | Status: DISCONTINUED | OUTPATIENT
Start: 2021-11-05 | End: 2021-11-08

## 2021-11-05 RX ORDER — DEXTROSE 50 % IN WATER 50 %
50 SYRINGE (ML) INTRAVENOUS ONCE
Refills: 0 | Status: COMPLETED | OUTPATIENT
Start: 2021-11-05 | End: 2021-11-05

## 2021-11-05 RX ORDER — PYRIDOXINE HCL (VITAMIN B6) 100 MG
50 TABLET ORAL DAILY
Refills: 0 | Status: DISCONTINUED | OUTPATIENT
Start: 2021-11-05 | End: 2021-11-09

## 2021-11-05 RX ORDER — MYCOPHENOLATE MOFETIL 250 MG/1
250 CAPSULE ORAL
Refills: 0 | Status: DISCONTINUED | OUTPATIENT
Start: 2021-11-05 | End: 2021-11-05

## 2021-11-05 RX ORDER — ERTAPENEM SODIUM 1 G/1
INJECTION, POWDER, LYOPHILIZED, FOR SOLUTION INTRAMUSCULAR; INTRAVENOUS
Refills: 0 | Status: DISCONTINUED | OUTPATIENT
Start: 2021-11-05 | End: 2021-11-09

## 2021-11-05 RX ORDER — TAMSULOSIN HYDROCHLORIDE 0.4 MG/1
0.8 CAPSULE ORAL AT BEDTIME
Refills: 0 | Status: DISCONTINUED | OUTPATIENT
Start: 2021-11-05 | End: 2021-11-09

## 2021-11-05 RX ORDER — PREGABALIN 225 MG/1
1000 CAPSULE ORAL DAILY
Refills: 0 | Status: DISCONTINUED | OUTPATIENT
Start: 2021-11-05 | End: 2021-11-09

## 2021-11-05 RX ORDER — INSULIN LISPRO 100/ML
VIAL (ML) SUBCUTANEOUS AT BEDTIME
Refills: 0 | Status: DISCONTINUED | OUTPATIENT
Start: 2021-11-05 | End: 2021-11-08

## 2021-11-05 RX ORDER — DEXTROSE 50 % IN WATER 50 %
12.5 SYRINGE (ML) INTRAVENOUS ONCE
Refills: 0 | Status: DISCONTINUED | OUTPATIENT
Start: 2021-11-05 | End: 2021-11-08

## 2021-11-05 RX ORDER — SODIUM ZIRCONIUM CYCLOSILICATE 10 G/10G
5 POWDER, FOR SUSPENSION ORAL ONCE
Refills: 0 | Status: COMPLETED | OUTPATIENT
Start: 2021-11-05 | End: 2021-11-05

## 2021-11-05 RX ORDER — SODIUM CHLORIDE 9 MG/ML
1000 INJECTION, SOLUTION INTRAVENOUS
Refills: 0 | Status: DISCONTINUED | OUTPATIENT
Start: 2021-11-05 | End: 2021-11-07

## 2021-11-05 RX ORDER — INFLUENZA VIRUS VACCINE 15; 15; 15; 15 UG/.5ML; UG/.5ML; UG/.5ML; UG/.5ML
0.7 SUSPENSION INTRAMUSCULAR ONCE
Refills: 0 | Status: COMPLETED | OUTPATIENT
Start: 2021-11-05 | End: 2021-11-05

## 2021-11-05 RX ORDER — INSULIN HUMAN 100 [IU]/ML
5 INJECTION, SOLUTION SUBCUTANEOUS ONCE
Refills: 0 | Status: COMPLETED | OUTPATIENT
Start: 2021-11-05 | End: 2021-11-05

## 2021-11-05 RX ORDER — VALGANCICLOVIR 450 MG/1
450 TABLET, FILM COATED ORAL DAILY
Refills: 0 | Status: DISCONTINUED | OUTPATIENT
Start: 2021-11-05 | End: 2021-11-09

## 2021-11-05 RX ORDER — ERTAPENEM SODIUM 1 G/1
500 INJECTION, POWDER, LYOPHILIZED, FOR SOLUTION INTRAMUSCULAR; INTRAVENOUS ONCE
Refills: 0 | Status: COMPLETED | OUTPATIENT
Start: 2021-11-05 | End: 2021-11-05

## 2021-11-05 RX ORDER — NIFEDIPINE 30 MG
30 TABLET, EXTENDED RELEASE 24 HR ORAL DAILY
Refills: 0 | Status: DISCONTINUED | OUTPATIENT
Start: 2021-11-05 | End: 2021-11-09

## 2021-11-05 RX ORDER — ATENOLOL 25 MG/1
25 TABLET ORAL DAILY
Refills: 0 | Status: DISCONTINUED | OUTPATIENT
Start: 2021-11-05 | End: 2021-11-09

## 2021-11-05 RX ORDER — VALGANCICLOVIR 450 MG/1
750 TABLET, FILM COATED ORAL
Qty: 0 | Refills: 0 | DISCHARGE

## 2021-11-05 RX ADMIN — HEPARIN SODIUM 5000 UNIT(S): 5000 INJECTION INTRAVENOUS; SUBCUTANEOUS at 14:13

## 2021-11-05 RX ADMIN — Medication 100 MILLIGRAM(S): at 14:13

## 2021-11-05 RX ADMIN — VALGANCICLOVIR 450 MILLIGRAM(S): 450 TABLET, FILM COATED ORAL at 17:38

## 2021-11-05 RX ADMIN — ERTAPENEM SODIUM 100 MILLIGRAM(S): 1 INJECTION, POWDER, LYOPHILIZED, FOR SOLUTION INTRAMUSCULAR; INTRAVENOUS at 14:14

## 2021-11-05 RX ADMIN — HEPARIN SODIUM 5000 UNIT(S): 5000 INJECTION INTRAVENOUS; SUBCUTANEOUS at 23:01

## 2021-11-05 RX ADMIN — INSULIN HUMAN 5 UNIT(S): 100 INJECTION, SOLUTION SUBCUTANEOUS at 23:09

## 2021-11-05 RX ADMIN — SODIUM ZIRCONIUM CYCLOSILICATE 5 GRAM(S): 10 POWDER, FOR SUSPENSION ORAL at 23:10

## 2021-11-05 RX ADMIN — ATENOLOL 25 MILLIGRAM(S): 25 TABLET ORAL at 14:13

## 2021-11-05 RX ADMIN — Medication 50 MILLILITER(S): at 23:10

## 2021-11-05 RX ADMIN — MAGNESIUM OXIDE 400 MG ORAL TABLET 400 MILLIGRAM(S): 241.3 TABLET ORAL at 17:37

## 2021-11-05 RX ADMIN — Medication 1 APPLICATION(S): at 17:37

## 2021-11-05 NOTE — CONSULT NOTE ADULT - PROBLEM SELECTOR RECOMMENDATION 4
c/w nifedipine 30mg qd and atenolol 25 mg qd  monitor bp c/w nifedipine 30mg qd and atenolol 25 mg qd  monitor bp  pt with normal LVEF 60% on echo and negative stress test in Dec 2020

## 2021-11-05 NOTE — PHARMACOTHERAPY INTERVENTION NOTE - COMMENTS
confirmed transplant meds with patient:   per patient on mycophenolate 250mg - 2 times a day  Envarsus XR- total 5mg once daily  Valganciclovir 450mg once daily

## 2021-11-05 NOTE — PATIENT PROFILE ADULT - OVER THE PAST TWO WEEKS, HAVE YOU FELT LITTLE INTEREST OR PLEASURE IN DOING THINGS?
Problem: MOBILITY - ADULT  Goal: Maintain or return to baseline ADL function  Description: INTERVENTIONS:  -  Assess patient's ability to carry out ADLs; assess patient's baseline for ADL function and identify physical deficits which impact ability to perform ADLs (bathing, care of mouth/teeth, toileting, grooming, dressing, etc )  - Assess/evaluate cause of self-care deficits   - Assess range of motion  - Assess patient's mobility; develop plan if impaired  - Assess patient's need for assistive devices and provide as appropriate  - Encourage maximum independence but intervene and supervise when necessary  - Involve family in performance of ADLs  - Assess for home care needs following discharge   - Consider OT consult to assist with ADL evaluation and planning for discharge  - Provide patient education as appropriate  Outcome: Progressing  Goal: Maintains/Returns to pre admission functional level  Description: INTERVENTIONS:  - Perform BMAT or MOVE assessment daily    - Set and communicate daily mobility goal to care team and patient/family/caregiver  - Collaborate with rehabilitation services on mobility goals if consulted  - Perform Range of Motion  times a day  - Reposition patient every  hours    - Dangle patient  times a day  - Stand patient  times a day  - Ambulate patient  times a day  - Out of bed to chair  times a day   - Out of bed for meals  times a day  - Out of bed for toileting  - Record patient progress and toleration of activity level   Outcome: Progressing     Problem: PAIN - ADULT  Goal: Verbalizes/displays adequate comfort level or baseline comfort level  Description: Interventions:  - Encourage patient to monitor pain and request assistance  - Assess pain using appropriate pain scale  - Administer analgesics based on type and severity of pain and evaluate response  - Implement non-pharmacological measures as appropriate and evaluate response  - Consider cultural and social influences on pain and pain management  - Notify physician/advanced practitioner if interventions unsuccessful or patient reports new pain  Outcome: Progressing     Problem: SAFETY ADULT  Goal: Patient will remain free of falls  Description: INTERVENTIONS:  - Educate patient/family on patient safety including physical limitations  - Instruct patient to call for assistance with activity   - Consult OT/PT to assist with strengthening/mobility   - Keep Call bell within reach  - Keep bed low and locked with side rails adjusted as appropriate  - Keep care items and personal belongings within reach  - Initiate and maintain comfort rounds  - Make Fall Risk Sign visible to staff  - Offer Toileting every  Hours, in advance of need  - Initiate/Maintain alarm  - Obtain necessary fall risk management equipment:   - Apply yellow socks and bracelet for high fall risk patients  - Consider moving patient to room near nurses station  Outcome: Progressing     Problem: DISCHARGE PLANNING  Goal: Discharge to home or other facility with appropriate resources  Description: INTERVENTIONS:  - Identify barriers to discharge w/patient and caregiver  - Arrange for needed discharge resources and transportation as appropriate  - Identify discharge learning needs (meds, wound care, etc )  - Arrange for interpretive services to assist at discharge as needed  - Refer to Case Management Department for coordinating discharge planning if the patient needs post-hospital services based on physician/advanced practitioner order or complex needs related to functional status, cognitive ability, or social support system  Outcome: Progressing     Problem: Knowledge Deficit  Goal: Patient/family/caregiver demonstrates understanding of disease process, treatment plan, medications, and discharge instructions  Description: Complete learning assessment and assess knowledge base    Interventions:  - Provide teaching at level of understanding  - Provide teaching via preferred learning methods  Outcome: Progressing     Problem: Prexisting or High Potential for Compromised Skin Integrity  Goal: Skin integrity is maintained or improved  Description: INTERVENTIONS:  - Identify patients at risk for skin breakdown  - Assess and monitor skin integrity  - Assess and monitor nutrition and hydration status  - Monitor labs   - Assess for incontinence   - Turn and reposition patient  - Assist with mobility/ambulation  - Relieve pressure over bony prominences  - Avoid friction and shearing  - Provide appropriate hygiene as needed including keeping skin clean and dry  - Evaluate need for skin moisturizer/barrier cream  - Collaborate with interdisciplinary team   - Patient/family teaching  - Consider wound care consult   Outcome: Progressing     Problem: Potential for Falls  Goal: Patient will remain free of falls  Description: INTERVENTIONS:  - Educate patient/family on patient safety including physical limitations  - Instruct patient to call for assistance with activity   - Consult OT/PT to assist with strengthening/mobility   - Keep Call bell within reach  - Keep bed low and locked with side rails adjusted as appropriate  - Keep care items and personal belongings within reach  - Initiate and maintain comfort rounds  - Make Fall Risk Sign visible to staff  - Offer Toileting every  Hours, in advance of need  - Initiate/Maintain alarm  - Obtain necessary fall risk management equipment:  - Apply yellow socks and bracelet for high fall risk patients  - Consider moving patient to room near nurses station  Outcome: Progressing no

## 2021-11-05 NOTE — CHART NOTE - NSCHARTNOTEFT_GEN_A_CORE
11-05    137  |  105  |  36<H>  ----------------------------<  122<H>  5.7<H>   |  22  |  2.79<H>    Ca    10.1      05 Nov 2021 18:04  Phos  3.0     11-05  Mg     1.60     11-05    -EKG performed, normal sinus rhythm, no peaked T waves  -Patient is resting comfortably in bed, denies symptoms.   -Will dose 5U Insulin with D50, and Lokelma x1.  -Repeat BMP  -Discussed with Nephrology

## 2021-11-05 NOTE — CONSULT NOTE ADULT - ATTENDING COMMENTS
CKD stage 4  s/p renal transplant on immunosupressant meds  Recurrent UTI  Obstructive uropathy    Cont current medications, monitor labs and urine output. Avoid nephrotoxins.

## 2021-11-05 NOTE — CONSULT NOTE ADULT - PROBLEM SELECTOR RECOMMENDATION 3
H/o b/l nephrectomies for RCC in 2015, was on HD for 3 years before DDRT in 2018  -c/w home transplant immunosuppressive meds: prednisone 5 mg qd, cellcept 250 mg bid, and Envarsus XR 1mg and 4mg (total 5 mg) qd  -pt now has CKD4, last creat 2.69 on 10/22  -recommend nephrology consult  -check prograf level 30 minutes prior to AM dose  -IVF, avoid nephrotoxins, dose meds per renal fxn H/o b/l nephrectomies for RCC in 2015, was on HD for 3 years before DDRT in 2018  -c/w home transplant immunosuppressive meds: prednisone 5 mg qd, cellcept 250 mg bid, and Envarsus XR 1mg and 4mg (total 5 mg) qd  -pt now has CKD4, last creat 2.69 on 10/22  -recommend nephrology consult, outpt transplant nephrologist is Dr. Bhakta  -last renal duplex (8/20) showed mild fullness of the transplant collecting system and urothelial thickening, similar in appearance to the prior study dated 8/1/2021.  -check prograf level 30 minutes prior to AM dose  -IVF, avoid nephrotoxins, dose meds per renal fxn H/o b/l nephrectomies for RCC in 2015, was on HD for 3 years before DDRT in 2018  -c/w home transplant immunosuppressive meds: prednisone 5 mg qd, cellcept 250 mg bid, and Envarsus XR 1mg and 4mg (total 5 mg) qd  -pt now has CKD4, last creat 2.69 on 10/22  -recommend nephrology consult, outpt transplant nephrologist is Dr. Bhakta  -last renal duplex (8/20) showed mild fullness of the transplant collecting system and urothelial thickening, similar in appearance to the prior study dated 8/1/2021.  -check prograf level 30 minutes prior to AM dose  -IVF, avoid nephrotoxins, dose meds per renal fxn  -hold home calcitriol pending lab, last Ca mildly elevated to 10.6 on 10/22

## 2021-11-05 NOTE — CONSULT NOTE ADULT - SUBJECTIVE AND OBJECTIVE BOX
Denise Parsons MD  Pager [phone]    HPI:  71 year old male with PMHx of HTN, DM-2, RCC (left renal cancer, right renal lesions) s/p bilateral nephrectomy (), HCV due to positive kidney donor s/p treatment, was on dialysis for 3 years before DDRT at [facility] in , ureteral stricture of transplant kidney s/p ureteral stent, prostate CA s/p radiation therapy, recurrent Pseudomonal UTIs (hospitalized ; ), previous SBO s/p ileostomy w/ reversal , BPH, recent admission at [facility] for pseudomonal UTI and CHELA due to urinary retention requiring indwelling armenta catheter for the past 2 months, admitted for antibiotics prior to TURP next Monday (). Patient reports he developed urinary retention after radiation therapy, currently denies dysuria/fever. He denies h/o cardiopulmonary disease.       PAST MEDICAL & SURGICAL HISTORY:  HTN (hypertension)    Type 2 diabetes mellitus  dx: &#x27;88    ESRD (end stage renal disease)  On Dialysis &#x27;  to 2018    Hepatitis C  In Donor Kidney: patient received treatment for Hep. 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 s/p Reversal    S/P right knee arthroscopy  &#x27; 90&#x27;s    Kidney transplant recipient  2018  @ [facility] :  +  Hep C Donor    H/O colonoscopy        Review of Systems:   CONSTITUTIONAL: No fever, weight loss, or fatigue  EYES: No eye pain, visual disturbances, or discharge  ENMT:  No difficulty hearing, tinnitus, vertigo; No sinus or throat pain  NECK: No pain or stiffness  BREASTS: No pain, masses, or nipple discharge  RESPIRATORY: No cough, wheezing, chills or hemoptysis; No shortness of breath  CARDIOVASCULAR: No chest pain, palpitations, dizziness, or leg swelling  GASTROINTESTINAL: No abdominal or epigastric pain. No nausea, vomiting, or hematemesis; No diarrhea or constipation. No melena or hematochezia.  GENITOURINARY: recurrent UTI, urinary retention requiring armenta catheter  NEUROLOGICAL: No headaches, memory loss, loss of strength, numbness, or tremors  SKIN: No itching, burning, rashes, or lesions   LYMPH NODES: No enlarged glands  ENDOCRINE: No heat or cold intolerance; No hair loss  MUSCULOSKELETAL: No joint pain or swelling; No muscle, back, or extremity pain  PSYCHIATRIC: No depression, anxiety, mood swings, or difficulty sleeping  HEME/LYMPH: No easy bruising, or bleeding gums  ALLERY AND IMMUNOLOGIC: No hives or eczema    Allergies    No Known Allergies    Intolerances    Social History: Pt does not smoke, drink alcohol, or use any recreational drugs.    FAMILY HISTORY:  FH: breast cancer (Mother)    FH: type 2 diabetes (Father)    FH: heart attack (Sibling)  age 54 at death    Home Medications:  allopurinol 100 mg oral tablet: 1 tab(s) orally once a day (2021 07:36)  atenolol 25 mg oral tablet: 1 tab(s) orally once a day AM (2021 07:36)  calcitriol 0.5 mcg oral capsule: 1 cap(s) orally once a day (2021 07:36)  calcium (as carbonate) 600 mg oral tablet: 1 tab(s) orally once a day (2021 07:36)  Calcium 600+D 600 mg-5 mcg (200 intl units) oral tablet: 1 tab(s) orally 3 times a day (2021 12:02)  Colace 100 mg oral capsule: 1 cap(s) orally once a day (2021 07:36)  famotidine 20 mg oral tablet: 1 tab(s) orally once a day (2021 07:36)  glimepiride 2 mg oral tablet: 1 tab(s) orally once a day (2021 07:36)  Januvia 25 mg oral tablet: 1 tab(s) orally once a day AM (2021 07:36)  magnesium oxide 400 mg oral tablet: 1 tab(s) orally once a day (2021 07:36)  mycophenolate mofetil 250 mg oral capsule: 1 cap(s) orally 2 times a day (2021 07:36)  NIFEdipine 30 mg oral tablet, extended release: 1 tab(s) orally once a day (2021 07:36)  predniSONE 5 mg oral tablet: 1 tab(s) orally once a day (2021 07:36)  tamsulosin 0.4 mg oral capsule: 2 cap(s) orally once a day (2021 07:36)  valGANciclovir: 750 milligram(s) orally once a day (2021 07:36)  vitamin b complex: 1 tab(s) orally once a day (2021 07:36)  Vitamin B-12: 1 tab(s) orally once a day (2021 07:36)  Vitamin B6: 1 tab(s) orally once a day (2021 07:36)      MEDICATIONS  (STANDING):  allopurinol 100 milliGRAM(s) Oral daily  ATENolol  Tablet 25 milliGRAM(s) Oral daily  calcium carbonate 1250 mG  + Vitamin D (OsCal 500 + D) 1 Tablet(s) Oral daily  cyanocobalamin 1000 MICROGram(s) Oral daily  ertapenem  IVPB      famotidine    Tablet 20 milliGRAM(s) Oral daily  heparin   Injectable 5000 Unit(s) SubCutaneous every 8 hours  influenza  Vaccine (HIGH DOSE) 0.7 milliLiter(s) IntraMuscular once  magnesium oxide 400 milliGRAM(s) Oral daily  mycophenolate mofetil 250 milliGRAM(s) Oral two times a day  NIFEdipine XL 30 milliGRAM(s) Oral daily  predniSONE   Tablet 5 milliGRAM(s) Oral daily  pyridoxine 50 milliGRAM(s) Oral daily  silver sulfADIAZINE 1% Cream 1 Application(s) Topical two times a day  tamsulosin 0.8 milliGRAM(s) Oral at bedtime    MEDICATIONS  (PRN):      Vital Signs Last 24 Hrs  T(C): 36 (2021 11:10), Max: 36 (2021 11:10)  T(F): 96.8 (2021 11:10), Max: 96.8 (2021 11:10)  HR: 87 (2021 11:10) (87 - 87)  BP: 134/66 (2021 11:10) (134/66 - 134/66)  BP(mean): --  RR: 18 (2021 11:10) (18 - 18)  SpO2: 100% (2021 11:10) (100% - 100%)  CAPILLARY BLOOD GLUCOSE      POCT Blood Glucose.: 173 mg/dL (2021 11:30)    I&O's Summary      PHYSICAL EXAM:  GENERAL: NAD, well-developed  HEAD:  Atraumatic, Normocephalic  EYES: EOMI, PERRLA, conjunctiva and sclera clear  NECK: Supple, No JVD  CHEST/LUNG: Clear to auscultation bilaterally; No wheeze  HEART: Regular rate and rhythm; No murmurs, rubs, or gallops  ABDOMEN: Soft, Nontender, Nondistended; Bowel sounds present  : indwelling armenta to leg bag  EXTREMITIES:  2+ Peripheral Pulses, No clubbing, cyanosis, or edema, left AVF  PSYCH: AAOx3  NEUROLOGY: non-focal  SKIN: No rashes or lesions    LABS:    Microbiology     RADIOLOGY & ADDITIONAL TESTS:    Imaging Personally Reviewed:  < from: CT Abdomen and Pelvis No Cont (21 @ 05:44) >  IMPRESSION:    No acute gastrointestinal abnormality on this unenhanced CT.  No convincing focal bowel wall thickening.  Right hemicolectomy.  No small bowel obstruction.    Right lower quadrant pelvic renal transplant with air in the calyces status post ureteral stent placement.  Although air may be related to reflux, gas-forming organism should be excluded clinically.  Bladder is decompressed by a Armenta catheter.      < from: US Trans Kidney w/ Doppler, Right (21 @ 16:43) >  IMPRESSION:    Again noted, is mild fullness of the transplant collecting system and urothelial thickening, similar in appearance to the prior study dated 2021.    No evidence of a significant renal artery stenosis. Elevated resistive indices are again noted, similar to prior study.      < from: Transthoracic Echocardiogram (20 @ 11:41) >  CONCLUSIONS:  LVEF 60%  1. Mitral annular calcification, otherwise normal mitral  valve. Minimal mitral regurgitation.  2. Calcified trileaflet aortic valve with normal opening.  Mild aortic regurgitation.  3. Normal left ventricular internal dimensions and wall  thicknesses.  4. Normal left ventricular systolic function. No segmental  wall motion abnormalities.  5. The right ventricle is not well visualized; grossly  normal right ventricular systolic function.  6. Estimated right ventricular systolic pressure equals 37  mm Hg, assuming right atrial pressure equals 10 mm Hg,  consistent with borderline pulmonary hypertension.      < from: Nuclear Stress Test-Exercise (Nuclear Stress Test-Exercise .) (20 @ 13:38) >  IMPRESSIONS:Normal Study  * Myocardial Perfusion SPECT results are normal at 99 % of  MPHR.  * Review of raw data shows: The study is of good technical  quality.  * The leftventricle was normal in size. Normal myocardial  perfusion scan,with no evidence of infarction or inducible  ischemia.  * Post-stress gated wall motion analysis was performed  (LVEF = 50 %;LVEDV = 134 ml.), revealing normal LV  systolic function. There were no segmental wall motion  abnormalities. The LV time activity curve suggested  diastolic dysfunction. RV function appeared normal.      Consultant(s) Notes Reviewed:      Care Discussed with Consultants/Other Providers: urology MATY Carballo, nephrology consult, check prograf level 30min before am dose

## 2021-11-05 NOTE — CONSULT NOTE ADULT - SUBJECTIVE AND OBJECTIVE BOX
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 Nephrology consultation requested for kidney transplant management.     Pt. had DDKT at [facility] in 2018. Pt. follows up with Dr. Bhakta as outpatient. Pt. is currently on prednisone 5 mg PO OD,  mg po BID, Envarus 5 mg PO OD. Pt. denies SOB, CP, HA, N/V, abdominal pain , diarrhe aor constipation or urinary symptoms. As per pt. Scr ranges between 2.4 -3.5. Last Scr is 2.79 today.     PAST HISTORY  --------------------------------------------------------------------------------  PAST MEDICAL & SURGICAL HISTORY:  HTN (hypertension)    Type 2 diabetes mellitus  dx: &#x27;88    ESRD (end stage renal disease)  On Dialysis &#x27;  to 2018    Hepatitis C  In Donor Kidney: patient received treatment for Hep. 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 s/p Reversal    S/P right knee arthroscopy  &#x27; 90&#x27;s    Kidney transplant recipient  2018  @ [facility] :  +  Hep C Donor    H/O colonoscopy      FAMILY HISTORY:  FH: breast cancer (Mother)    FH: type 2 diabetes (Father)    FH: heart attack (Sibling)  age 54 at death      PAST SOCIAL HISTORY:    ALLERGIES & MEDICATIONS  --------------------------------------------------------------------------------  Allergies    No Known Allergies    Intolerances      Standing Inpatient Medications  allopurinol 100 milliGRAM(s) Oral daily  ATENolol  Tablet 25 milliGRAM(s) Oral daily  cyanocobalamin 1000 MICROGram(s) Oral daily  dextrose 40% Gel 15 Gram(s) Oral once  dextrose 5%. 1000 milliLiter(s) IV Continuous <Continuous>  dextrose 5%. 1000 milliLiter(s) IV Continuous <Continuous>  dextrose 50% Injectable 25 Gram(s) IV Push once  dextrose 50% Injectable 12.5 Gram(s) IV Push once  dextrose 50% Injectable 25 Gram(s) IV Push once  ertapenem  IVPB      famotidine    Tablet 20 milliGRAM(s) Oral daily  glucagon  Injectable 1 milliGRAM(s) IntraMuscular once  heparin   Injectable 5000 Unit(s) SubCutaneous every 8 hours  influenza  Vaccine (HIGH DOSE) 0.7 milliLiter(s) IntraMuscular once  insulin lispro (ADMELOG) corrective regimen sliding scale   SubCutaneous three times a day before meals  insulin lispro (ADMELOG) corrective regimen sliding scale   SubCutaneous at bedtime  magnesium oxide 400 milliGRAM(s) Oral daily  NIFEdipine XL 30 milliGRAM(s) Oral daily  predniSONE   Tablet 5 milliGRAM(s) Oral daily  pyridoxine 50 milliGRAM(s) Oral daily  silver sulfADIAZINE 1% Cream 1 Application(s) Topical two times a day  sodium chloride 0.45%. 1000 milliLiter(s) IV Continuous <Continuous>  tamsulosin 0.8 milliGRAM(s) Oral at bedtime  valGANciclovir 450 milliGRAM(s) Oral daily    PRN Inpatient Medications    REVIEW OF SYSTEMS  --------------------------------------------------------------------------------  Gen: No fevers/chills  Skin: No rashes  Head/Eyes/Ears: Normal hearing,   Respiratory: No dyspnea, cough  CV: No chest pain  GI: No abdominal pain, diarrhea  : No dysuria, hematuria  MSK: No  edema  Heme: No easy bruising or bleeding  Psych: No significant depression    All other systems were reviewed and are negative, except as noted.    VITALS/PHYSICAL EXAM  --------------------------------------------------------------------------------  T(C): 36.9 (21 @ 17:36), Max: 36.9 (21 @ 17:36)  HR: 72 (21 @ 17:36) (70 - 87)  BP: 119/58 (21 @ 17:36) (119/58 - 142/71)  RR: 17 (21 @ 17:36) (17 - 18)  SpO2: 98% (21 @ 17:36) (97% - 100%)  Wt(kg): --    Physical Exam:  	Gen: NAD, appears calm  	HEENT: MMM  	Pulm: CTA B/L, no crackles   	CV: S1S2  	Abd: Soft, +BS   	Ext: No LE edema B/L  	Neuro: Awake  	Skin: Warm and dry  	Vascular access:    LABS/STUDIES  --------------------------------------------------------------------------------              10.8   5.40  >-----------<  71       [21 @ 18:04]              34.4     137  |  105  |  36  ----------------------------<  122      [21 @ 18:04]  5.7   |  22  |  2.79        Ca     10.1     [21 18:04]      Mg     1.60     [21 18:04]      Phos  3.0     [21 18:04]    Creatinine Trend:  SCr 2.79 [ 18:04]  SCr 2.69 [10-22 @ 13:47]  SCr 2.88 [10-15 @ 17:11]    Urinalysis - [21 @ 04:00]      Color Light Orange / Appearance Turbid / SG 1.017 / pH 6.5      Gluc Negative / Ketone Negative  / Bili Negative / Urobili Negative       Blood Moderate / Protein 300 mg/dL / Leuk Est Large / Nitrite Positive      RBC 44 /  / Hyaline 1 / Gran  / Sq Epi  / Non Sq Epi 0 / Bacteria Negative    Iron 75, TIBC 278, %sat 27      [21 @ 16:51]  Ferritin 450      [21 @ 17:06]  PTH -- (Ca 10.3)      [21 @ 09:37]   22  PTH -- (Ca --)      [21 @ 17:06]   15  Vitamin D (25OH) 37.0      [21 @ 09:37]  HbA1c 6.0      [19 @ 23:48]    HBsAb <3.0      [19 @ 22:44]  HBsAg Nonreact      [19 @ 22:44]  HBcAb Nonreact      [19 @ 22:44]  HCV 0.07, Nonreact      [19 @ 22:44]  HIV Nonreact      [08-15-18 @ 14:37]   [facility] DIVISION OF KIDNEY DISEASES AND HYPERTENSION -- [phone]  -- INITIAL CONSULT NOTE  --------------------------------------------------------------------------------  HPI: 71 year old male with PMHx of HTN, DM-2, RCC (left renal cancer, right renal lesions) s/p bilateral nephrectomy (), HCV due to positive kidney donor s/p treatment, was on dialysis for 3 years before DDRT at [facility] in 2018, 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 Nephrology consultation requested for kidney transplant management.     Pt. had DDKT at [facility] in 2018. Pt. follows up with Dr. Bhakta as outpatient. Pt. is currently on prednisone 5 mg PO OD,  mg po BID, Envarus 5 mg PO OD. Pt. denies SOB, CP, HA, N/V, abdominal pain , diarrhe aor constipation or urinary symptoms. As per pt. Scr ranges between 2.4 -3.5. Last Scr is 2.79 today.     PAST HISTORY  --------------------------------------------------------------------------------  PAST MEDICAL & SURGICAL HISTORY:  HTN (hypertension)    Type 2 diabetes mellitus  dx: &#x27;88    ESRD (end stage renal disease)  On Dialysis &#x27;  to 2018    Hepatitis C  In Donor Kidney: patient received treatment for Hep. 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 s/p Reversal    S/P right knee arthroscopy  &#x27; 90&#x27;s    Kidney transplant recipient  2018  @ [facility] :  +  Hep C Donor    H/O colonoscopy      FAMILY HISTORY:  FH: breast cancer (Mother)    FH: type 2 diabetes (Father)    FH: heart attack (Sibling)  age 54 at death      PAST SOCIAL HISTORY: No smoking, drugs or alcohol use.    ALLERGIES & MEDICATIONS  --------------------------------------------------------------------------------  Allergies    No Known Allergies    Intolerances      Standing Inpatient Medications  allopurinol 100 milliGRAM(s) Oral daily  ATENolol  Tablet 25 milliGRAM(s) Oral daily  cyanocobalamin 1000 MICROGram(s) Oral daily  dextrose 40% Gel 15 Gram(s) Oral once  dextrose 5%. 1000 milliLiter(s) IV Continuous <Continuous>  dextrose 5%. 1000 milliLiter(s) IV Continuous <Continuous>  dextrose 50% Injectable 25 Gram(s) IV Push once  dextrose 50% Injectable 12.5 Gram(s) IV Push once  dextrose 50% Injectable 25 Gram(s) IV Push once  ertapenem  IVPB      famotidine    Tablet 20 milliGRAM(s) Oral daily  glucagon  Injectable 1 milliGRAM(s) IntraMuscular once  heparin   Injectable 5000 Unit(s) SubCutaneous every 8 hours  influenza  Vaccine (HIGH DOSE) 0.7 milliLiter(s) IntraMuscular once  insulin lispro (ADMELOG) corrective regimen sliding scale   SubCutaneous three times a day before meals  insulin lispro (ADMELOG) corrective regimen sliding scale   SubCutaneous at bedtime  magnesium oxide 400 milliGRAM(s) Oral daily  NIFEdipine XL 30 milliGRAM(s) Oral daily  predniSONE   Tablet 5 milliGRAM(s) Oral daily  pyridoxine 50 milliGRAM(s) Oral daily  silver sulfADIAZINE 1% Cream 1 Application(s) Topical two times a day  sodium chloride 0.45%. 1000 milliLiter(s) IV Continuous <Continuous>  tamsulosin 0.8 milliGRAM(s) Oral at bedtime  valGANciclovir 450 milliGRAM(s) Oral daily    PRN Inpatient Medications    REVIEW OF SYSTEMS  --------------------------------------------------------------------------------  Gen: No fevers/chills  Skin: No rashes  Head/Eyes/Ears: Normal hearing,   Respiratory: No dyspnea, cough  CV: No chest pain  GI: No abdominal pain, diarrhea  : No dysuria, hematuria  MSK: No  edema  Heme: No easy bruising or bleeding  Psych: No significant depression    All other systems were reviewed and are negative, except as noted.    VITALS/PHYSICAL EXAM  --------------------------------------------------------------------------------  T(C): 36.9 (21 @ 17:36), Max: 36.9 (21 @ 17:36)  HR: 72 (21 @ 17:36) (70 - 87)  BP: 119/58 (21 @ 17:36) (119/58 - 142/71)  RR: 17 (21 @ 17:36) (17 - 18)  SpO2: 98% (21 @ 17:36) (97% - 100%)  Wt(kg): --    Physical Exam:  	Gen: NAD, appears calm  	HEENT: MMM  	Pulm: CTA B/L, no crackles   	CV: S1S2  	Abd: Soft, +BS   	Ext: No LE edema B/L  	Neuro: Awake  	Skin: Warm and dry  	Vascular access:    LABS/STUDIES  --------------------------------------------------------------------------------              10.8   5.40  >-----------<  71       [21 @ 18:04]              34.4     137  |  105  |  36  ----------------------------<  122      [21 18:04]  5.7   |  22  |  2.79        Ca     10.1     [21 18:04]      Mg     1.60     [21 18:04]      Phos  3.0     [21 18:04]    Creatinine Trend:  SCr 2.79 [ 18:04]  SCr 2.69 [10-22 @ 13:47]  SCr 2.88 [10-15 @ 17:11]    Urinalysis - [21 @ 04:00]      Color Light Orange / Appearance Turbid / SG 1.017 / pH 6.5      Gluc Negative / Ketone Negative  / Bili Negative / Urobili Negative       Blood Moderate / Protein 300 mg/dL / Leuk Est Large / Nitrite Positive      RBC 44 /  / Hyaline 1 / Gran  / Sq Epi  / Non Sq Epi 0 / Bacteria Negative    Iron 75, TIBC 278, %sat 27      [21 @ 16:51]  Ferritin 450      [21 @ 17:06]  PTH -- (Ca 10.3)      [21 @ 09:37]   22  PTH -- (Ca --)      [21 @ 17:06]   15  Vitamin D (25OH) 37.0      [21 @ 09:37]  HbA1c 6.0      [19 @ 23:48]    HBsAb <3.0      [19 @ 22:44]  HBsAg Nonreact      [19 @ 22:44]  HBcAb Nonreact      [19 @ 22:44]  HCV 0.07, Nonreact      [19 @ 22:44]  HIV Nonreact      [08-15-18 @ 14:37]

## 2021-11-05 NOTE — CONSULT NOTE ADULT - PROBLEM SELECTOR RECOMMENDATION 9
Pt. with DDKT in 2018 , currently on prednisone 5 mg PO OD,  mg po BID, Envarus 5 mg PO OD. Recommend continuing prednisone, MMF and Envarsus XR 5 mg po OD.  As per pt. Scr ranges between 2.4 -3.5. Last Scr is 2.79 today. Frequent bladder scan to rule out retention. Check Tac level 30 min prior to next dose. Monitor labs and urine output. Avoid any potential nephrotoxins. Dose medications as per eGFR.    If you have any questions, please feel free to contact me  Geoff Ramos  Nephrology Fellow  [phone]  (After 5pm or on weekends please page the on-call fellow).
-pt with urinary retention d/t BPH requiring armenta cath for the past two months, pt reports symptoms got worse after radiation therapy for prostate CA  -admitted for abx prior to TURP on 11/8  -management per , started on ertapenem

## 2021-11-05 NOTE — CONSULT NOTE ADULT - PROBLEM SELECTOR RECOMMENDATION 2
-recent admission for pseudomonas UTI in august at [facility], treated with zosyn 8/21-8/25  -Ucx 8/20 pseudomonas was resistant to quinolone and intermediate to imipenem, Bcx 8/20 no growth   -started on ertapenem per   -c/w prophylactic bactrim and allopurinol

## 2021-11-06 LAB
ANION GAP SERPL CALC-SCNC: 10 MMOL/L — SIGNIFICANT CHANGE UP (ref 7–14)
BUN SERPL-MCNC: 34 MG/DL — HIGH (ref 7–23)
CALCIUM SERPL-MCNC: 9.7 MG/DL — SIGNIFICANT CHANGE UP (ref 8.4–10.5)
CHLORIDE SERPL-SCNC: 107 MMOL/L — SIGNIFICANT CHANGE UP (ref 98–107)
CO2 SERPL-SCNC: 22 MMOL/L — SIGNIFICANT CHANGE UP (ref 22–31)
CREAT SERPL-MCNC: 2.65 MG/DL — HIGH (ref 0.5–1.3)
GLUCOSE BLDC GLUCOMTR-MCNC: 117 MG/DL — HIGH (ref 70–99)
GLUCOSE BLDC GLUCOMTR-MCNC: 118 MG/DL — HIGH (ref 70–99)
GLUCOSE BLDC GLUCOMTR-MCNC: 152 MG/DL — HIGH (ref 70–99)
GLUCOSE BLDC GLUCOMTR-MCNC: 200 MG/DL — HIGH (ref 70–99)
GLUCOSE SERPL-MCNC: 99 MG/DL — SIGNIFICANT CHANGE UP (ref 70–99)
POTASSIUM SERPL-MCNC: 4.9 MMOL/L — SIGNIFICANT CHANGE UP (ref 3.5–5.3)
POTASSIUM SERPL-SCNC: 4.9 MMOL/L — SIGNIFICANT CHANGE UP (ref 3.5–5.3)
SODIUM SERPL-SCNC: 139 MMOL/L — SIGNIFICANT CHANGE UP (ref 135–145)
TACROLIMUS SERPL-MCNC: 5.5 NG/ML — SIGNIFICANT CHANGE UP

## 2021-11-06 PROCEDURE — 99222 1ST HOSP IP/OBS MODERATE 55: CPT | Mod: GC

## 2021-11-06 PROCEDURE — 99232 SBSQ HOSP IP/OBS MODERATE 35: CPT

## 2021-11-06 RX ADMIN — Medication 50 MILLIGRAM(S): at 13:20

## 2021-11-06 RX ADMIN — Medication 100 MILLIGRAM(S): at 13:20

## 2021-11-06 RX ADMIN — ATENOLOL 25 MILLIGRAM(S): 25 TABLET ORAL at 05:22

## 2021-11-06 RX ADMIN — VALGANCICLOVIR 450 MILLIGRAM(S): 450 TABLET, FILM COATED ORAL at 13:20

## 2021-11-06 RX ADMIN — PREGABALIN 1000 MICROGRAM(S): 225 CAPSULE ORAL at 13:21

## 2021-11-06 RX ADMIN — Medication 1: at 11:46

## 2021-11-06 RX ADMIN — Medication 30 MILLIGRAM(S): at 05:22

## 2021-11-06 RX ADMIN — ERTAPENEM SODIUM 100 MILLIGRAM(S): 1 INJECTION, POWDER, LYOPHILIZED, FOR SOLUTION INTRAMUSCULAR; INTRAVENOUS at 13:15

## 2021-11-06 RX ADMIN — FAMOTIDINE 20 MILLIGRAM(S): 10 INJECTION INTRAVENOUS at 13:21

## 2021-11-06 RX ADMIN — MYCOPHENOLATE MOFETIL 250 MILLIGRAM(S): 250 CAPSULE ORAL at 17:05

## 2021-11-06 RX ADMIN — Medication 5 MILLIGRAM(S): at 05:22

## 2021-11-06 RX ADMIN — TACROLIMUS 5 MILLIGRAM(S): 5 CAPSULE ORAL at 05:40

## 2021-11-06 RX ADMIN — HEPARIN SODIUM 5000 UNIT(S): 5000 INJECTION INTRAVENOUS; SUBCUTANEOUS at 05:21

## 2021-11-06 RX ADMIN — HEPARIN SODIUM 5000 UNIT(S): 5000 INJECTION INTRAVENOUS; SUBCUTANEOUS at 13:21

## 2021-11-06 RX ADMIN — Medication 1 APPLICATION(S): at 05:23

## 2021-11-06 RX ADMIN — Medication 1 APPLICATION(S): at 17:06

## 2021-11-06 RX ADMIN — Medication 1: at 17:04

## 2021-11-06 RX ADMIN — TAMSULOSIN HYDROCHLORIDE 0.8 MILLIGRAM(S): 0.4 CAPSULE ORAL at 00:22

## 2021-11-06 RX ADMIN — HEPARIN SODIUM 5000 UNIT(S): 5000 INJECTION INTRAVENOUS; SUBCUTANEOUS at 21:57

## 2021-11-06 RX ADMIN — MAGNESIUM OXIDE 400 MG ORAL TABLET 400 MILLIGRAM(S): 241.3 TABLET ORAL at 13:21

## 2021-11-06 RX ADMIN — MYCOPHENOLATE MOFETIL 250 MILLIGRAM(S): 250 CAPSULE ORAL at 05:23

## 2021-11-06 RX ADMIN — TAMSULOSIN HYDROCHLORIDE 0.8 MILLIGRAM(S): 0.4 CAPSULE ORAL at 21:57

## 2021-11-06 NOTE — PROGRESS NOTE ADULT - ASSESSMENT
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

## 2021-11-06 NOTE — PROGRESS NOTE ADULT - SUBJECTIVE AND OBJECTIVE BOX
Patient is a 71y old  Male who presents with a chief complaint of abx for TURP on Monday (05 Nov 2021 12:44)      SUBJECTIVE / OVERNIGHT EVENTS: Pt seen and examined at 11:55am, no overnight events, pt says he had 2 soft stool today, no other complaints. No other new issues reported    MEDICATIONS  (STANDING):  allopurinol 100 milliGRAM(s) Oral daily  ATENolol  Tablet 25 milliGRAM(s) Oral daily  cyanocobalamin 1000 MICROGram(s) Oral daily  dextrose 40% Gel 15 Gram(s) Oral once  dextrose 5%. 1000 milliLiter(s) (50 mL/Hr) IV Continuous <Continuous>  dextrose 5%. 1000 milliLiter(s) (100 mL/Hr) IV Continuous <Continuous>  dextrose 50% Injectable 25 Gram(s) IV Push once  dextrose 50% Injectable 12.5 Gram(s) IV Push once  dextrose 50% Injectable 25 Gram(s) IV Push once  ertapenem  IVPB      ertapenem  IVPB 500 milliGRAM(s) IV Intermittent every 24 hours  famotidine    Tablet 20 milliGRAM(s) Oral daily  glucagon  Injectable 1 milliGRAM(s) IntraMuscular once  heparin   Injectable 5000 Unit(s) SubCutaneous every 8 hours  influenza  Vaccine (HIGH DOSE) 0.7 milliLiter(s) IntraMuscular once  insulin lispro (ADMELOG) corrective regimen sliding scale   SubCutaneous three times a day before meals  insulin lispro (ADMELOG) corrective regimen sliding scale   SubCutaneous at bedtime  magnesium oxide 400 milliGRAM(s) Oral daily  mycophenolate mofetil 250 milliGRAM(s) Oral two times a day  NIFEdipine XL 30 milliGRAM(s) Oral daily  predniSONE   Tablet 5 milliGRAM(s) Oral daily  pyridoxine 50 milliGRAM(s) Oral daily  silver sulfADIAZINE 1% Cream 1 Application(s) Topical two times a day  sodium chloride 0.45%. 1000 milliLiter(s) (30 mL/Hr) IV Continuous <Continuous>  tacrolimus ER Tablet (ENVARSUS XR) 5 milliGRAM(s) Oral daily  tamsulosin 0.8 milliGRAM(s) Oral at bedtime  valGANciclovir 450 milliGRAM(s) Oral daily    MEDICATIONS  (PRN):      Vital Signs Last 24 Hrs  T(C): 36.9 (06 Nov 2021 09:15), Max: 37.1 (05 Nov 2021 22:46)  T(F): 98.5 (06 Nov 2021 09:15), Max: 98.7 (05 Nov 2021 22:46)  HR: 71 (06 Nov 2021 09:15) (62 - 80)  BP: 120/60 (06 Nov 2021 09:15) (119/58 - 152/69)  BP(mean): --  RR: 18 (06 Nov 2021 09:15) (17 - 18)  SpO2: 97% (06 Nov 2021 09:15) (97% - 100%)  CAPILLARY BLOOD GLUCOSE      POCT Blood Glucose.: 200 mg/dL (06 Nov 2021 11:37)  POCT Blood Glucose.: 117 mg/dL (06 Nov 2021 07:29)  POCT Blood Glucose.: 119 mg/dL (05 Nov 2021 23:49)  POCT Blood Glucose.: 114 mg/dL (05 Nov 2021 22:55)  POCT Blood Glucose.: 140 mg/dL (05 Nov 2021 16:38)    I&O's Summary    05 Nov 2021 07:01  -  06 Nov 2021 07:00  --------------------------------------------------------  IN: 0 mL / OUT: 150 mL / NET: -150 mL    06 Nov 2021 08:01  -  06 Nov 2021 14:19  --------------------------------------------------------  IN: 0 mL / OUT: 500 mL / NET: -500 mL        PHYSICAL EXAM:  GENERAL: NAD, well-developed  CHEST/LUNG: Clear to auscultation bilaterally; No wheeze  HEART: Regular rate and rhythm  ABDOMEN: Soft, Nontender, Nondistended  EXTREMITIES: no LE edema  : + armenta to leg bag  PSYCH: Calm  NEUROLOGY: AA, answers questions appropriately  SKIN: No rashes or lesions    LABS:                        10.8   5.40  )-----------( 71       ( 05 Nov 2021 18:04 )             34.4     11-06    139  |  107  |  34<H>  ----------------------------<  99  4.9   |  22  |  2.65<H>    Ca    9.7      06 Nov 2021 05:49  Phos  3.0     11-05  Mg     1.60     11-05                RADIOLOGY & ADDITIONAL TESTS:    Imaging Personally Reviewed:    Consultant(s) Notes Reviewed:      Care Discussed with Consultants/Other Providers:

## 2021-11-06 NOTE — PROGRESS NOTE ADULT - SUBJECTIVE AND OBJECTIVE BOX
Overnight events:  Pt treated successfully for hyperkalemia with medical therapy, no EKG changes, remained afebrile    Subjective:  Pt offers no complaints    Objective:    Vital signs  T(C): , Max: 37.1 (11-05-21 @ 22:46)  HR: 71 (11-06-21 @ 09:15)  BP: 120/60 (11-06-21 @ 09:15)  SpO2: 97% (11-06-21 @ 09:15)  Wt(kg): --    Output   Armenta: 500 (+ pt emptied in toilet himself)  11-05 @ 07:01  -  11-06 @ 07:00  --------------------------------------------------------  IN: 0 mL / OUT: 150 mL / NET: -150 mL    11-06 @ 08:01  -  11-06 @ 11:02  --------------------------------------------------------  IN: 0 mL / OUT: 500 mL / NET: -500 mL        Gen: NAD  Abd: soft, nontender  : + ramenta    Labs                        10.8   5.40  )-----------( 71       ( 05 Nov 2021 18:04 )             34.4     06 Nov 2021 05:49    139    |  107    |  34     ----------------------------<  99     4.9     |  22     |  2.65     Ca    9.7        06 Nov 2021 05:49  Phos  3.0       05 Nov 2021 18:04  Mg     1.60      05 Nov 2021 18:04

## 2021-11-06 NOTE — PROGRESS NOTE ADULT - PROBLEM SELECTOR PLAN 1
-pt with urinary retention d/t BPH requiring armenta cath for the past two months, pt reports symptoms got worse after radiation therapy for prostate CA  -admitted for abx prior to TURP on 11/8  -management per , started on ertapenem.

## 2021-11-06 NOTE — PROGRESS NOTE ADULT - PROBLEM SELECTOR PLAN 2
-recent admission for pseudomonas UTI in august at [facility], treated with zosyn 8/21-8/25  -Ucx 8/20 pseudomonas was resistant to quinolone and intermediate to imipenem, Bcx 8/20 no growth   -started on ertapenem per   -c/w prophylactic bactrim and allopurinol.

## 2021-11-06 NOTE — PROGRESS NOTE ADULT - ASSESSMENT
70 yo M admitted 11/5 for IV abx prior to planned TURP 11/8, treated successfully for hyperkalemia last evening, nephrology consult obtained    Plan:  -AM BMP reviewed  -f/u tacrolimus level  -continue low K+ diet  -continue ertapenem  -f/u nephrology  -f/u medicine  -COVID swab 11/7  - DVT prophy, IS, OOB, ambulate

## 2021-11-07 ENCOUNTER — TRANSCRIPTION ENCOUNTER (OUTPATIENT)
Age: 71
End: 2021-11-07

## 2021-11-07 LAB
ANION GAP SERPL CALC-SCNC: 11 MMOL/L — SIGNIFICANT CHANGE UP (ref 7–14)
APTT BLD: 43.8 SEC — HIGH (ref 27–36.3)
BLD GP AB SCN SERPL QL: NEGATIVE — SIGNIFICANT CHANGE UP
BUN SERPL-MCNC: 33 MG/DL — HIGH (ref 7–23)
CALCIUM SERPL-MCNC: 9.9 MG/DL — SIGNIFICANT CHANGE UP (ref 8.4–10.5)
CHLORIDE SERPL-SCNC: 103 MMOL/L — SIGNIFICANT CHANGE UP (ref 98–107)
CO2 SERPL-SCNC: 22 MMOL/L — SIGNIFICANT CHANGE UP (ref 22–31)
CREAT SERPL-MCNC: 2.64 MG/DL — HIGH (ref 0.5–1.3)
GLUCOSE BLDC GLUCOMTR-MCNC: 107 MG/DL — HIGH (ref 70–99)
GLUCOSE BLDC GLUCOMTR-MCNC: 124 MG/DL — HIGH (ref 70–99)
GLUCOSE BLDC GLUCOMTR-MCNC: 129 MG/DL — HIGH (ref 70–99)
GLUCOSE BLDC GLUCOMTR-MCNC: 161 MG/DL — HIGH (ref 70–99)
GLUCOSE SERPL-MCNC: 102 MG/DL — HIGH (ref 70–99)
HCT VFR BLD CALC: 35.7 % — LOW (ref 39–50)
HGB BLD-MCNC: 10.8 G/DL — LOW (ref 13–17)
INR BLD: 0.98 RATIO — SIGNIFICANT CHANGE UP (ref 0.88–1.16)
MAGNESIUM SERPL-MCNC: 1.6 MG/DL — SIGNIFICANT CHANGE UP (ref 1.6–2.6)
MCHC RBC-ENTMCNC: 30.1 PG — SIGNIFICANT CHANGE UP (ref 27–34)
MCHC RBC-ENTMCNC: 30.3 GM/DL — LOW (ref 32–36)
MCV RBC AUTO: 99.4 FL — SIGNIFICANT CHANGE UP (ref 80–100)
NRBC # BLD: 0 /100 WBCS — SIGNIFICANT CHANGE UP
NRBC # FLD: 0 K/UL — SIGNIFICANT CHANGE UP
PHOSPHATE SERPL-MCNC: 3.5 MG/DL — SIGNIFICANT CHANGE UP (ref 2.5–4.5)
PLATELET # BLD AUTO: 135 K/UL — LOW (ref 150–400)
POTASSIUM SERPL-MCNC: 4.5 MMOL/L — SIGNIFICANT CHANGE UP (ref 3.5–5.3)
POTASSIUM SERPL-SCNC: 4.5 MMOL/L — SIGNIFICANT CHANGE UP (ref 3.5–5.3)
PROTHROM AB SERPL-ACNC: 11.3 SEC — SIGNIFICANT CHANGE UP (ref 10.6–13.6)
RBC # BLD: 3.59 M/UL — LOW (ref 4.2–5.8)
RBC # FLD: 14.2 % — SIGNIFICANT CHANGE UP (ref 10.3–14.5)
RH IG SCN BLD-IMP: POSITIVE — SIGNIFICANT CHANGE UP
SARS-COV-2 RNA SPEC QL NAA+PROBE: SIGNIFICANT CHANGE UP
SODIUM SERPL-SCNC: 136 MMOL/L — SIGNIFICANT CHANGE UP (ref 135–145)
TACROLIMUS SERPL-MCNC: 6.3 NG/ML — SIGNIFICANT CHANGE UP
WBC # BLD: 4.25 K/UL — SIGNIFICANT CHANGE UP (ref 3.8–10.5)
WBC # FLD AUTO: 4.25 K/UL — SIGNIFICANT CHANGE UP (ref 3.8–10.5)

## 2021-11-07 PROCEDURE — 71045 X-RAY EXAM CHEST 1 VIEW: CPT | Mod: 26

## 2021-11-07 PROCEDURE — 99222 1ST HOSP IP/OBS MODERATE 55: CPT | Mod: GC

## 2021-11-07 PROCEDURE — 99232 SBSQ HOSP IP/OBS MODERATE 35: CPT

## 2021-11-07 RX ORDER — LACTOBACILLUS ACIDOPHILUS 100MM CELL
1 CAPSULE ORAL ONCE
Refills: 0 | Status: COMPLETED | OUTPATIENT
Start: 2021-11-07 | End: 2021-11-07

## 2021-11-07 RX ORDER — SODIUM CHLORIDE 9 MG/ML
1000 INJECTION INTRAMUSCULAR; INTRAVENOUS; SUBCUTANEOUS
Refills: 0 | Status: DISCONTINUED | OUTPATIENT
Start: 2021-11-07 | End: 2021-11-08

## 2021-11-07 RX ORDER — FAMOTIDINE 10 MG/ML
20 INJECTION INTRAVENOUS DAILY
Refills: 0 | Status: DISCONTINUED | OUTPATIENT
Start: 2021-11-07 | End: 2021-11-09

## 2021-11-07 RX ORDER — MAGNESIUM SULFATE 500 MG/ML
1 VIAL (ML) INJECTION ONCE
Refills: 0 | Status: COMPLETED | OUTPATIENT
Start: 2021-11-07 | End: 2021-11-07

## 2021-11-07 RX ADMIN — MAGNESIUM OXIDE 400 MG ORAL TABLET 400 MILLIGRAM(S): 241.3 TABLET ORAL at 12:08

## 2021-11-07 RX ADMIN — Medication 100 MILLIGRAM(S): at 11:38

## 2021-11-07 RX ADMIN — HEPARIN SODIUM 5000 UNIT(S): 5000 INJECTION INTRAVENOUS; SUBCUTANEOUS at 06:11

## 2021-11-07 RX ADMIN — Medication 1: at 11:37

## 2021-11-07 RX ADMIN — MYCOPHENOLATE MOFETIL 250 MILLIGRAM(S): 250 CAPSULE ORAL at 06:12

## 2021-11-07 RX ADMIN — ATENOLOL 25 MILLIGRAM(S): 25 TABLET ORAL at 06:11

## 2021-11-07 RX ADMIN — Medication 1 TABLET(S): at 17:58

## 2021-11-07 RX ADMIN — ERTAPENEM SODIUM 100 MILLIGRAM(S): 1 INJECTION, POWDER, LYOPHILIZED, FOR SOLUTION INTRAMUSCULAR; INTRAVENOUS at 14:01

## 2021-11-07 RX ADMIN — HEPARIN SODIUM 5000 UNIT(S): 5000 INJECTION INTRAVENOUS; SUBCUTANEOUS at 21:34

## 2021-11-07 RX ADMIN — Medication 50 MILLIGRAM(S): at 12:08

## 2021-11-07 RX ADMIN — MYCOPHENOLATE MOFETIL 250 MILLIGRAM(S): 250 CAPSULE ORAL at 17:35

## 2021-11-07 RX ADMIN — TAMSULOSIN HYDROCHLORIDE 0.8 MILLIGRAM(S): 0.4 CAPSULE ORAL at 21:34

## 2021-11-07 RX ADMIN — Medication 1 APPLICATION(S): at 06:11

## 2021-11-07 RX ADMIN — Medication 30 MILLIGRAM(S): at 06:12

## 2021-11-07 RX ADMIN — HEPARIN SODIUM 5000 UNIT(S): 5000 INJECTION INTRAVENOUS; SUBCUTANEOUS at 14:01

## 2021-11-07 RX ADMIN — PREGABALIN 1000 MICROGRAM(S): 225 CAPSULE ORAL at 12:08

## 2021-11-07 RX ADMIN — Medication 1 APPLICATION(S): at 17:35

## 2021-11-07 RX ADMIN — FAMOTIDINE 20 MILLIGRAM(S): 10 INJECTION INTRAVENOUS at 11:38

## 2021-11-07 RX ADMIN — TACROLIMUS 5 MILLIGRAM(S): 5 CAPSULE ORAL at 06:11

## 2021-11-07 RX ADMIN — Medication 5 MILLIGRAM(S): at 06:12

## 2021-11-07 RX ADMIN — VALGANCICLOVIR 450 MILLIGRAM(S): 450 TABLET, FILM COATED ORAL at 12:08

## 2021-11-07 RX ADMIN — Medication 100 GRAM(S): at 11:37

## 2021-11-07 NOTE — PROGRESS NOTE ADULT - SUBJECTIVE AND OBJECTIVE BOX
Patient is a 71y old  Male who presents with a chief complaint of abx for TURP on Monday (05 Nov 2021 12:44)      SUBJECTIVE / OVERNIGHT EVENTS: Pt seen and examined at 11:20am, no overnight events, pt says he had 2 loose stool today, no other complaints. No other new issues reported        MEDICATIONS  (STANDING):  allopurinol 100 milliGRAM(s) Oral daily  ATENolol  Tablet 25 milliGRAM(s) Oral daily  cyanocobalamin 1000 MICROGram(s) Oral daily  dextrose 40% Gel 15 Gram(s) Oral once  dextrose 5%. 1000 milliLiter(s) (50 mL/Hr) IV Continuous <Continuous>  dextrose 5%. 1000 milliLiter(s) (100 mL/Hr) IV Continuous <Continuous>  dextrose 50% Injectable 25 Gram(s) IV Push once  dextrose 50% Injectable 12.5 Gram(s) IV Push once  dextrose 50% Injectable 25 Gram(s) IV Push once  ertapenem  IVPB      ertapenem  IVPB 500 milliGRAM(s) IV Intermittent every 24 hours  famotidine    Tablet 20 milliGRAM(s) Oral daily  glucagon  Injectable 1 milliGRAM(s) IntraMuscular once  heparin   Injectable 5000 Unit(s) SubCutaneous every 8 hours  influenza  Vaccine (HIGH DOSE) 0.7 milliLiter(s) IntraMuscular once  insulin lispro (ADMELOG) corrective regimen sliding scale   SubCutaneous three times a day before meals  insulin lispro (ADMELOG) corrective regimen sliding scale   SubCutaneous at bedtime  magnesium oxide 400 milliGRAM(s) Oral daily  mycophenolate mofetil 250 milliGRAM(s) Oral two times a day  NIFEdipine XL 30 milliGRAM(s) Oral daily  predniSONE   Tablet 5 milliGRAM(s) Oral daily  pyridoxine 50 milliGRAM(s) Oral daily  silver sulfADIAZINE 1% Cream 1 Application(s) Topical two times a day  sodium chloride 0.45%. 1000 milliLiter(s) (30 mL/Hr) IV Continuous <Continuous>  sodium chloride 0.9%. 1000 milliLiter(s) (50 mL/Hr) IV Continuous <Continuous>  tacrolimus ER Tablet (ENVARSUS XR) 5 milliGRAM(s) Oral daily  tamsulosin 0.8 milliGRAM(s) Oral at bedtime  valGANciclovir 450 milliGRAM(s) Oral daily    MEDICATIONS  (PRN):      Vital Signs Last 24 Hrs  T(C): 36.6 (07 Nov 2021 10:00), Max: 37 (06 Nov 2021 14:15)  T(F): 97.9 (07 Nov 2021 10:00), Max: 98.6 (06 Nov 2021 14:15)  HR: 69 (07 Nov 2021 10:00) (64 - 72)  BP: 128/61 (07 Nov 2021 10:00) (112/58 - 150/70)  BP(mean): --  RR: 16 (07 Nov 2021 10:00) (16 - 18)  SpO2: 99% (07 Nov 2021 10:00) (98% - 100%)  CAPILLARY BLOOD GLUCOSE      POCT Blood Glucose.: 161 mg/dL (07 Nov 2021 11:33)  POCT Blood Glucose.: 107 mg/dL (07 Nov 2021 07:29)  POCT Blood Glucose.: 118 mg/dL (06 Nov 2021 22:20)  POCT Blood Glucose.: 152 mg/dL (06 Nov 2021 16:58)    I&O's Summary    06 Nov 2021 08:01  -  07 Nov 2021 07:00  --------------------------------------------------------  IN: 0 mL / OUT: 2250 mL / NET: -2250 mL    07 Nov 2021 07:01  -  07 Nov 2021 13:37  --------------------------------------------------------  IN: 0 mL / OUT: 300 mL / NET: -300 mL        PHYSICAL EXAM:  GENERAL: NAD, well-developed  CHEST/LUNG: Clear to auscultation bilaterally; No wheeze  HEART: Regular rate and rhythm  ABDOMEN: Soft, Nontender, Nondistended  EXTREMITIES: no LE edema  : + armenta to leg bag  PSYCH: Calm  NEUROLOGY: AA, answers questions appropriately  SKIN: No rashes or lesions    LABS:                        10.8   4.25  )-----------( 135      ( 07 Nov 2021 05:25 )             35.7     11-07    136  |  103  |  33<H>  ----------------------------<  102<H>  4.5   |  22  |  2.64<H>    Ca    9.9      07 Nov 2021 05:25  Phos  3.5     11-07  Mg     1.60     11-07      PT/INR - ( 07 Nov 2021 05:25 )   PT: 11.3 sec;   INR: 0.98 ratio         PTT - ( 07 Nov 2021 05:25 )  PTT:43.8 sec          RADIOLOGY & ADDITIONAL TESTS:    Imaging Personally Reviewed:    Consultant(s) Notes Reviewed:      Care Discussed with Consultants/Other Providers:

## 2021-11-07 NOTE — PROGRESS NOTE ADULT - ASSESSMENT
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

## 2021-11-07 NOTE — PROGRESS NOTE ADULT - PROBLEM SELECTOR PLAN 6
c/w pepcid 20 mg qd.    -monitor loose stools- pt reports loose stools, no leukocytosis, cont to monitor,  team aware

## 2021-11-07 NOTE — CHART NOTE - NSCHARTNOTEFT_GEN_A_CORE
Vascular Surgery Pre-Op Checklist    Patient is a 71y old  Male who presents with a chief complaint of abx for TURP on Monday (05 Nov 2021 12:44)    Diagnosis: BPH  Procedure: TURP  Surgeon: Marjan    Labs:                         10.8   4.25  )-----------( 135      ( 07 Nov 2021 05:25 )             35.7     11-07    136  |  103  |  33<H>  ----------------------------<  102<H>  4.5   |  22  |  2.64<H>    Ca    9.9      07 Nov 2021 05:25  Phos  3.5     11-07  Mg     1.60     11-07      PT/INR - ( 07 Nov 2021 05:25 )   PT: 11.3 sec;   INR: 0.98 ratio         PTT - ( 07 Nov 2021 05:25 )  PTT:43.8 sec  ABO Interpretation: O (11-07 @ 05:21)         Medications:  MEDICATIONS  (STANDING):  allopurinol 100 milliGRAM(s) Oral daily  ATENolol  Tablet 25 milliGRAM(s) Oral daily  cyanocobalamin 1000 MICROGram(s) Oral daily  dextrose 40% Gel 15 Gram(s) Oral once  dextrose 5%. 1000 milliLiter(s) (50 mL/Hr) IV Continuous <Continuous>  dextrose 5%. 1000 milliLiter(s) (100 mL/Hr) IV Continuous <Continuous>  dextrose 50% Injectable 25 Gram(s) IV Push once  dextrose 50% Injectable 25 Gram(s) IV Push once  dextrose 50% Injectable 12.5 Gram(s) IV Push once  ertapenem  IVPB 500 milliGRAM(s) IV Intermittent every 24 hours  ertapenem  IVPB      famotidine    Tablet 20 milliGRAM(s) Oral daily  glucagon  Injectable 1 milliGRAM(s) IntraMuscular once  heparin   Injectable 5000 Unit(s) SubCutaneous every 8 hours  influenza  Vaccine (HIGH DOSE) 0.7 milliLiter(s) IntraMuscular once  insulin lispro (ADMELOG) corrective regimen sliding scale   SubCutaneous three times a day before meals  insulin lispro (ADMELOG) corrective regimen sliding scale   SubCutaneous at bedtime  magnesium oxide 400 milliGRAM(s) Oral daily  mycophenolate mofetil 250 milliGRAM(s) Oral two times a day  NIFEdipine XL 30 milliGRAM(s) Oral daily  predniSONE   Tablet 5 milliGRAM(s) Oral daily  pyridoxine 50 milliGRAM(s) Oral daily  silver sulfADIAZINE 1% Cream 1 Application(s) Topical two times a day  sodium chloride 0.45%. 1000 milliLiter(s) (30 mL/Hr) IV Continuous <Continuous>  sodium chloride 0.9%. 1000 milliLiter(s) (50 mL/Hr) IV Continuous <Continuous>  tacrolimus ER Tablet (ENVARSUS XR) 5 milliGRAM(s) Oral daily  tamsulosin 0.8 milliGRAM(s) Oral at bedtime  valGANciclovir 450 milliGRAM(s) Oral daily    MEDICATIONS  (PRN):      Pre-Op Checklist  [x] NPO after midnight  [x] IVF  [x] T&S -- 2 in system, done   [x] AM CBC -- ordered  [x] CBC, BMP, coags in chart. reviewed   [ ] COVID -- pending  [x] CXR, EKG in chart  [x] Consent

## 2021-11-07 NOTE — PROGRESS NOTE ADULT - ASSESSMENT
72 yo M admitted 11/5 for IV abx prior to planned TURP 11/8, treated successfully for hyperkalemia last evening, nephrology consult obtained    Plan:  -AM BMP reviewed  -f/u tacrolimus level  -continue low K+ diet  -continue ertapenem  -f/u nephrology  -f/u medicine  - plan for TURP tmrw. preop today.

## 2021-11-07 NOTE — PROGRESS NOTE ADULT - PROBLEM SELECTOR PLAN 2
-recent admission for pseudomonas UTI in august at [facility], treated with zosyn 8/21-8/25  -Ucx 8/20 pseudomonas was resistant to quinolone and intermediate to imipenem, Bcx 8/20 no growth   -started on ertapenem per   -c/w prophylactic bactrim and allopurinol.

## 2021-11-07 NOTE — PROGRESS NOTE ADULT - PROBLEM SELECTOR PLAN 1
-pt with urinary retention d/t BPH requiring armenta cath for the past two months, pt reports symptoms got worse after radiation therapy for prostate CA  -admitted for abx prior to TURP on 11/8  -management per , on ertapenem.

## 2021-11-07 NOTE — PROGRESS NOTE ADULT - SUBJECTIVE AND OBJECTIVE BOX
DAILY PROGRESS NOTE:         24 hr events:  madhu     Objective:    Vital Signs Last 24 Hrs  T(C): 36.6 (07 Nov 2021 10:00), Max: 37 (06 Nov 2021 14:15)  T(F): 97.9 (07 Nov 2021 10:00), Max: 98.6 (06 Nov 2021 14:15)  HR: 69 (07 Nov 2021 10:00) (64 - 72)  BP: 128/61 (07 Nov 2021 10:00) (112/58 - 150/70)  BP(mean): --  RR: 16 (07 Nov 2021 10:00) (16 - 18)  SpO2: 99% (07 Nov 2021 10:00) (98% - 100%)    I&O's Detail    06 Nov 2021 08:01  -  07 Nov 2021 07:00  --------------------------------------------------------  IN:  Total IN: 0 mL    OUT:    Indwelling Catheter - Urethral (mL): 2250 mL  Total OUT: 2250 mL    Total NET: -2250 mL      07 Nov 2021 07:01  -  07 Nov 2021 11:04  --------------------------------------------------------  IN:  Total IN: 0 mL    OUT:    Indwelling Catheter - Urethral (mL): 300 mL  Total OUT: 300 mL    Total NET: -300 mL          Physical Exam:    General: NAD, well-nourished  CV: regular rate   : armenta leg bag w/ clear yellow urine   Psych: AOx3  Neuro: No focal deficits       Laboratory Results:                          10.8   4.25  )-----------( 135      ( 07 Nov 2021 05:25 )             35.7     11-07    136  |  103  |  33<H>  ----------------------------<  102<H>  4.5   |  22  |  2.64<H>    Ca    9.9      07 Nov 2021 05:25  Phos  3.5     11-07  Mg     1.60     11-07      PT/INR - ( 07 Nov 2021 05:25 )   PT: 11.3 sec;   INR: 0.98 ratio         PTT - ( 07 Nov 2021 05:25 )  PTT:43.8 sec

## 2021-11-08 ENCOUNTER — APPOINTMENT (OUTPATIENT)
Dept: UROLOGY | Facility: HOSPITAL | Age: 71
End: 2021-11-08

## 2021-11-08 ENCOUNTER — RESULT REVIEW (OUTPATIENT)
Age: 71
End: 2021-11-08

## 2021-11-08 DIAGNOSIS — N18.4 CHRONIC KIDNEY DISEASE, STAGE 4 (SEVERE): ICD-10-CM

## 2021-11-08 DIAGNOSIS — R19.7 DIARRHEA, UNSPECIFIED: ICD-10-CM

## 2021-11-08 LAB
C DIFF BY PCR RESULT: SIGNIFICANT CHANGE UP
C DIFF TOX GENS STL QL NAA+PROBE: SIGNIFICANT CHANGE UP
GLUCOSE BLDC GLUCOMTR-MCNC: 129 MG/DL — HIGH (ref 70–99)
GLUCOSE BLDC GLUCOMTR-MCNC: 143 MG/DL — HIGH (ref 70–99)
GLUCOSE BLDC GLUCOMTR-MCNC: 147 MG/DL — HIGH (ref 70–99)
GLUCOSE BLDC GLUCOMTR-MCNC: 88 MG/DL — SIGNIFICANT CHANGE UP (ref 70–99)

## 2021-11-08 PROCEDURE — 52332 CYSTOSCOPY AND TREATMENT: CPT | Mod: 22,RT

## 2021-11-08 PROCEDURE — 99232 SBSQ HOSP IP/OBS MODERATE 35: CPT | Mod: GC

## 2021-11-08 PROCEDURE — 99232 SBSQ HOSP IP/OBS MODERATE 35: CPT

## 2021-11-08 PROCEDURE — 88305 TISSUE EXAM BY PATHOLOGIST: CPT | Mod: 26

## 2021-11-08 PROCEDURE — 52601 PROSTATECTOMY (TURP): CPT

## 2021-11-08 RX ORDER — ACETAMINOPHEN 500 MG
1000 TABLET ORAL ONCE
Refills: 0 | Status: DISCONTINUED | OUTPATIENT
Start: 2021-11-08 | End: 2021-11-08

## 2021-11-08 RX ORDER — SODIUM CHLORIDE 9 MG/ML
1000 INJECTION, SOLUTION INTRAVENOUS
Refills: 0 | Status: DISCONTINUED | OUTPATIENT
Start: 2021-11-08 | End: 2021-11-09

## 2021-11-08 RX ORDER — DEXTROSE 50 % IN WATER 50 %
15 SYRINGE (ML) INTRAVENOUS ONCE
Refills: 0 | Status: DISCONTINUED | OUTPATIENT
Start: 2021-11-08 | End: 2021-11-09

## 2021-11-08 RX ORDER — INSULIN LISPRO 100/ML
VIAL (ML) SUBCUTANEOUS AT BEDTIME
Refills: 0 | Status: DISCONTINUED | OUTPATIENT
Start: 2021-11-08 | End: 2021-11-09

## 2021-11-08 RX ORDER — INSULIN LISPRO 100/ML
VIAL (ML) SUBCUTANEOUS EVERY 6 HOURS
Refills: 0 | Status: DISCONTINUED | OUTPATIENT
Start: 2021-11-08 | End: 2021-11-09

## 2021-11-08 RX ORDER — DEXTROSE 50 % IN WATER 50 %
25 SYRINGE (ML) INTRAVENOUS ONCE
Refills: 0 | Status: DISCONTINUED | OUTPATIENT
Start: 2021-11-08 | End: 2021-11-09

## 2021-11-08 RX ORDER — INSULIN LISPRO 100/ML
VIAL (ML) SUBCUTANEOUS
Refills: 0 | Status: DISCONTINUED | OUTPATIENT
Start: 2021-11-08 | End: 2021-11-09

## 2021-11-08 RX ORDER — DEXTROSE 50 % IN WATER 50 %
12.5 SYRINGE (ML) INTRAVENOUS ONCE
Refills: 0 | Status: DISCONTINUED | OUTPATIENT
Start: 2021-11-08 | End: 2021-11-09

## 2021-11-08 RX ORDER — GLUCAGON INJECTION, SOLUTION 0.5 MG/.1ML
1 INJECTION, SOLUTION SUBCUTANEOUS ONCE
Refills: 0 | Status: DISCONTINUED | OUTPATIENT
Start: 2021-11-08 | End: 2021-11-09

## 2021-11-08 RX ORDER — ACETAMINOPHEN 500 MG
1000 TABLET ORAL EVERY 8 HOURS
Refills: 0 | Status: COMPLETED | OUTPATIENT
Start: 2021-11-08 | End: 2021-11-09

## 2021-11-08 RX ADMIN — Medication 400 MILLIGRAM(S): at 14:59

## 2021-11-08 RX ADMIN — MYCOPHENOLATE MOFETIL 250 MILLIGRAM(S): 250 CAPSULE ORAL at 17:37

## 2021-11-08 RX ADMIN — FAMOTIDINE 20 MILLIGRAM(S): 10 INJECTION INTRAVENOUS at 12:28

## 2021-11-08 RX ADMIN — Medication 30 MILLIGRAM(S): at 06:00

## 2021-11-08 RX ADMIN — HEPARIN SODIUM 5000 UNIT(S): 5000 INJECTION INTRAVENOUS; SUBCUTANEOUS at 22:18

## 2021-11-08 RX ADMIN — Medication 400 MILLIGRAM(S): at 22:18

## 2021-11-08 RX ADMIN — PREGABALIN 1000 MICROGRAM(S): 225 CAPSULE ORAL at 12:28

## 2021-11-08 RX ADMIN — MAGNESIUM OXIDE 400 MG ORAL TABLET 400 MILLIGRAM(S): 241.3 TABLET ORAL at 12:28

## 2021-11-08 RX ADMIN — ATENOLOL 25 MILLIGRAM(S): 25 TABLET ORAL at 06:00

## 2021-11-08 RX ADMIN — TACROLIMUS 5 MILLIGRAM(S): 5 CAPSULE ORAL at 06:01

## 2021-11-08 RX ADMIN — ERTAPENEM SODIUM 100 MILLIGRAM(S): 1 INJECTION, POWDER, LYOPHILIZED, FOR SOLUTION INTRAMUSCULAR; INTRAVENOUS at 14:06

## 2021-11-08 RX ADMIN — SODIUM CHLORIDE 100 MILLILITER(S): 9 INJECTION, SOLUTION INTRAVENOUS at 10:52

## 2021-11-08 RX ADMIN — TAMSULOSIN HYDROCHLORIDE 0.8 MILLIGRAM(S): 0.4 CAPSULE ORAL at 22:18

## 2021-11-08 RX ADMIN — HEPARIN SODIUM 5000 UNIT(S): 5000 INJECTION INTRAVENOUS; SUBCUTANEOUS at 06:00

## 2021-11-08 RX ADMIN — Medication 100 MILLIGRAM(S): at 12:28

## 2021-11-08 RX ADMIN — HEPARIN SODIUM 5000 UNIT(S): 5000 INJECTION INTRAVENOUS; SUBCUTANEOUS at 13:45

## 2021-11-08 RX ADMIN — Medication 5 MILLIGRAM(S): at 06:00

## 2021-11-08 RX ADMIN — VALGANCICLOVIR 450 MILLIGRAM(S): 450 TABLET, FILM COATED ORAL at 12:28

## 2021-11-08 RX ADMIN — Medication 50 MILLIGRAM(S): at 12:28

## 2021-11-08 RX ADMIN — MYCOPHENOLATE MOFETIL 250 MILLIGRAM(S): 250 CAPSULE ORAL at 06:00

## 2021-11-08 RX ADMIN — Medication 1 APPLICATION(S): at 17:37

## 2021-11-08 NOTE — PROGRESS NOTE ADULT - SUBJECTIVE AND OBJECTIVE BOX
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 Nephrology consultation requested for kidney transplant management.     Pt. had DDKT at [facility] in 2018. Pt. follows up with Dr. Bhakta as outpatient. Pt. is currently on prednisone 5 mg PO OD,  mg po BID, Envarus 5 mg PO OD. As per pt. Scr ranges between 2.4 -3.5. Scr was 2.79 o 11/7. Scr is at 2.64 today.     Pt. seen and examined at bedside. Denies SOB, CP, HA, N/V, abdominal pain , diarrhea or constipation or urinary symptoms.     PAST HISTORY  --------------------------------------------------------------------------------  No significant changes to PMH, PSH, FHx, SHx, unless otherwise noted    ALLERGIES & MEDICATIONS  --------------------------------------------------------------------------------  Allergies    No Known Allergies    Intolerances    Standing Inpatient Medications  allopurinol 100 milliGRAM(s) Oral daily  ATENolol  Tablet 25 milliGRAM(s) Oral daily  cyanocobalamin 1000 MICROGram(s) Oral daily  dextrose 40% Gel 15 Gram(s) Oral once  dextrose 5%. 1000 milliLiter(s) IV Continuous <Continuous>  dextrose 5%. 1000 milliLiter(s) IV Continuous <Continuous>  dextrose 50% Injectable 25 Gram(s) IV Push once  dextrose 50% Injectable 12.5 Gram(s) IV Push once  dextrose 50% Injectable 25 Gram(s) IV Push once  ertapenem  IVPB      ertapenem  IVPB 500 milliGRAM(s) IV Intermittent every 24 hours  famotidine    Tablet 20 milliGRAM(s) Oral daily  glucagon  Injectable 1 milliGRAM(s) IntraMuscular once  heparin   Injectable 5000 Unit(s) SubCutaneous every 8 hours  influenza  Vaccine (HIGH DOSE) 0.7 milliLiter(s) IntraMuscular once  insulin lispro (ADMELOG) corrective regimen sliding scale   SubCutaneous every 6 hours  magnesium oxide 400 milliGRAM(s) Oral daily  mycophenolate mofetil 250 milliGRAM(s) Oral two times a day  NIFEdipine XL 30 milliGRAM(s) Oral daily  predniSONE   Tablet 5 milliGRAM(s) Oral daily  pyridoxine 50 milliGRAM(s) Oral daily  silver sulfADIAZINE 1% Cream 1 Application(s) Topical two times a day  sodium chloride 0.9%. 1000 milliLiter(s) IV Continuous <Continuous>  tacrolimus ER Tablet (ENVARSUS XR) 5 milliGRAM(s) Oral daily  tamsulosin 0.8 milliGRAM(s) Oral at bedtime  valGANciclovir 450 milliGRAM(s) Oral daily    PRN Inpatient Medications    REVIEW OF SYSTEMS  --------------------------------------------------------------------------------  Gen: No fevers   Respiratory: No dyspnea  CV: No chest pain  GI: No abdominal pain  : No dysuria  MSK: No  edema  Neuro: no dizziness     VITALS/PHYSICAL EXAM  --------------------------------------------------------------------------------  T(C): 36.9 (11-08-21 @ 05:54), Max: 37.1 (11-07-21 @ 21:32)  HR: 66 (11-08-21 @ 05:54) (64 - 71)  BP: 131/63 (11-08-21 @ 05:54) (116/67 - 133/58)  RR: 18 (11-08-21 @ 05:54) (16 - 18)  SpO2: 99% (11-08-21 @ 05:54) (98% - 100%)  Wt(kg): --    11-07-21 @ 07:01  -  11-08-21 @ 07:00  --------------------------------------------------------  IN: 0 mL / OUT: 2450 mL / NET: -2450 mL    Physical Exam:  	Gen: NAD, appears calm  	HEENT: MMM  	Pulm: CTA B/L, no crackles   	CV: S1S2  	Abd: Soft, +BS   	Ext: No LE edema B/L  	Neuro: Awake  	Skin: Warm and dry    LABS/STUDIES  --------------------------------------------------------------------------------              10.8   4.25  >-----------<  135      [11-07-21 @ 05:25]              35.7     136  |  103  |  33  ----------------------------<  102      [11-07-21 @ 05:25]  4.5   |  22  |  2.64        Ca     9.9     [11-07-21 @ 05:25]      Mg     1.60     [11-07-21 @ 05:25]      Phos  3.5     [11-07-21 @ 05:25]      PT/INR: PT 11.3 , INR 0.98       [11-07-21 @ 05:25]  PTT: 43.8       [11-07-21 @ 05:25]    Creatinine Trend:  SCr 2.64 [11-07 @ 05:25]  SCr 2.65 [11-06 @ 05:49]  SCr 2.79 [11-05 @ 18:04]  SCr 2.69 [10-22 @ 13:47]  SCr 2.88 [10-15 @ 17:11]

## 2021-11-08 NOTE — PROGRESS NOTE ADULT - SUBJECTIVE AND OBJECTIVE BOX
Post op check    This 70 yo M is s/pTURP  PMH: HTN; DM; Joel. neph; kidney transplant  Pt is awake and alert  Has no c/o  Afeb 123/67 67 98%RA    Abd- soft; NT  Velez/ CBI clear

## 2021-11-08 NOTE — PROGRESS NOTE ADULT - SUBJECTIVE AND OBJECTIVE BOX
[facility] Division of Hospital Medicine  Estephania Hammer MD  Pager (M-F, 8A-5P):  In-house pager [phone]; Long-range pager [phone]  Other Times:  Please page Hospitalist in Charge -  In-house pager [phone]    Patient is a 71y old  Male who presents with a chief complaint of abx for TURP on Monday (05 Nov 2021 12:44)      SUBJECTIVE / OVERNIGHT EVENTS: Back from surgery, doing ok, minimal burning sensation in penis from catheter.  No chest pain, SOB.  +diarrhea every few hours, no abdominal pain/fever, no nausea/vomiting.  ADDITIONAL REVIEW OF SYSTEMS:    MEDICATIONS  (STANDING):  acetaminophen   IVPB .. 1000 milliGRAM(s) IV Intermittent every 8 hours  allopurinol 100 milliGRAM(s) Oral daily  ATENolol  Tablet 25 milliGRAM(s) Oral daily  cyanocobalamin 1000 MICROGram(s) Oral daily  dextrose 40% Gel 15 Gram(s) Oral once  dextrose 5%. 1000 milliLiter(s) (50 mL/Hr) IV Continuous <Continuous>  dextrose 5%. 1000 milliLiter(s) (100 mL/Hr) IV Continuous <Continuous>  dextrose 50% Injectable 25 Gram(s) IV Push once  dextrose 50% Injectable 12.5 Gram(s) IV Push once  dextrose 50% Injectable 25 Gram(s) IV Push once  ertapenem  IVPB      ertapenem  IVPB 500 milliGRAM(s) IV Intermittent every 24 hours  famotidine    Tablet 20 milliGRAM(s) Oral daily  glucagon  Injectable 1 milliGRAM(s) IntraMuscular once  heparin   Injectable 5000 Unit(s) SubCutaneous every 8 hours  influenza  Vaccine (HIGH DOSE) 0.7 milliLiter(s) IntraMuscular once  insulin lispro (ADMELOG) corrective regimen sliding scale   SubCutaneous every 6 hours  lactated ringers. 1000 milliLiter(s) (100 mL/Hr) IV Continuous <Continuous>  magnesium oxide 400 milliGRAM(s) Oral daily  mycophenolate mofetil 250 milliGRAM(s) Oral two times a day  NIFEdipine XL 30 milliGRAM(s) Oral daily  predniSONE   Tablet 5 milliGRAM(s) Oral daily  pyridoxine 50 milliGRAM(s) Oral daily  silver sulfADIAZINE 1% Cream 1 Application(s) Topical two times a day  tacrolimus ER Tablet (ENVARSUS XR) 5 milliGRAM(s) Oral daily  tamsulosin 0.8 milliGRAM(s) Oral at bedtime  valGANciclovir 450 milliGRAM(s) Oral daily    MEDICATIONS  (PRN):      CAPILLARY BLOOD GLUCOSE      POCT Blood Glucose.: 143 mg/dL (08 Nov 2021 11:22)  POCT Blood Glucose.: 88 mg/dL (08 Nov 2021 06:15)  POCT Blood Glucose.: 124 mg/dL (07 Nov 2021 22:22)  POCT Blood Glucose.: 129 mg/dL (07 Nov 2021 16:50)    I&O's Summary    07 Nov 2021 07:01  -  08 Nov 2021 07:00  --------------------------------------------------------  IN: 0 mL / OUT: 2450 mL / NET: -2450 mL    08 Nov 2021 07:01  -  08 Nov 2021 15:14  --------------------------------------------------------  IN: 150 mL / OUT: 3000 mL / NET: -2850 mL        PHYSICAL EXAM:  Vital Signs Last 24 Hrs  T(C): 36.3 (08 Nov 2021 12:26), Max: 37.1 (07 Nov 2021 21:32)  T(F): 97.4 (08 Nov 2021 12:26), Max: 98.7 (07 Nov 2021 21:32)  HR: 67 (08 Nov 2021 12:26) (59 - 71)  BP: 123/67 (08 Nov 2021 12:26) (116/67 - 140/58)  BP(mean): 78 (08 Nov 2021 11:45) (74 - 81)  RR: 16 (08 Nov 2021 12:26) (10 - 18)  SpO2: 98% (08 Nov 2021 12:26) (94% - 100%)    GENERAL: NAD, well-developed, resting in bed  CHEST/LUNG: Clear to auscultation bilaterally; No wheeze  HEART: Regular rate and rhythm  ABDOMEN: Soft, Nontender, Nondistended  EXTREMITIES: no LE edema  : + armenta CBI clear  PSYCH: Calm  NEUROLOGY: AA, answers questions appropriately  SKIN: No rashes or lesions  LUE fistula +bruit/thrill    LABS:                        10.8   4.25  )-----------( 135      ( 07 Nov 2021 05:25 )             35.7     11-07    136  |  103  |  33<H>  ----------------------------<  102<H>  4.5   |  22  |  2.64<H>    Ca    9.9      07 Nov 2021 05:25  Phos  3.5     11-07  Mg     1.60     11-07    PT/INR - ( 07 Nov 2021 05:25 )   PT: 11.3 sec;   INR: 0.98 ratio    PTT - ( 07 Nov 2021 05:25 )  PTT:43.8 sec    RADIOLOGY & ADDITIONAL TESTS:  Results Reviewed:   Imaging Personally Reviewed:  Electrocardiogram Personally Reviewed:    COORDINATION OF CARE:  Care Discussed with Consultants/Other Providers [Y/N]: urology re overall care  Prior or Outpatient Records Reviewed [Y/N]:   [facility] Division of Hospital Medicine  Estephania Hammer MD  Pager (M-F, 8A-5P):  In-house pager [phone]; Long-range pager [phone]  Other Times:  Please page Hospitalist in Charge -  In-house pager [phone]    Patient is a 71y old  Male who presents with a chief complaint of abx for TURP on Monday (05 Nov 2021 12:44)    SUBJECTIVE / OVERNIGHT EVENTS: Back from surgery, doing ok, minimal burning sensation in penis from catheter.  No chest pain, SOB.  +diarrhea every few hours, no abdominal pain/fever, no nausea/vomiting.  ADDITIONAL REVIEW OF SYSTEMS:    MEDICATIONS  (STANDING):  acetaminophen   IVPB .. 1000 milliGRAM(s) IV Intermittent every 8 hours  allopurinol 100 milliGRAM(s) Oral daily  ATENolol  Tablet 25 milliGRAM(s) Oral daily  cyanocobalamin 1000 MICROGram(s) Oral daily  dextrose 40% Gel 15 Gram(s) Oral once  dextrose 5%. 1000 milliLiter(s) (50 mL/Hr) IV Continuous <Continuous>  dextrose 5%. 1000 milliLiter(s) (100 mL/Hr) IV Continuous <Continuous>  dextrose 50% Injectable 25 Gram(s) IV Push once  dextrose 50% Injectable 12.5 Gram(s) IV Push once  dextrose 50% Injectable 25 Gram(s) IV Push once  ertapenem  IVPB      ertapenem  IVPB 500 milliGRAM(s) IV Intermittent every 24 hours  famotidine    Tablet 20 milliGRAM(s) Oral daily  glucagon  Injectable 1 milliGRAM(s) IntraMuscular once  heparin   Injectable 5000 Unit(s) SubCutaneous every 8 hours  influenza  Vaccine (HIGH DOSE) 0.7 milliLiter(s) IntraMuscular once  insulin lispro (ADMELOG) corrective regimen sliding scale   SubCutaneous every 6 hours  lactated ringers. 1000 milliLiter(s) (100 mL/Hr) IV Continuous <Continuous>  magnesium oxide 400 milliGRAM(s) Oral daily  mycophenolate mofetil 250 milliGRAM(s) Oral two times a day  NIFEdipine XL 30 milliGRAM(s) Oral daily  predniSONE   Tablet 5 milliGRAM(s) Oral daily  pyridoxine 50 milliGRAM(s) Oral daily  silver sulfADIAZINE 1% Cream 1 Application(s) Topical two times a day  tacrolimus ER Tablet (ENVARSUS XR) 5 milliGRAM(s) Oral daily  tamsulosin 0.8 milliGRAM(s) Oral at bedtime  valGANciclovir 450 milliGRAM(s) Oral daily    MEDICATIONS  (PRN):      CAPILLARY BLOOD GLUCOSE      POCT Blood Glucose.: 143 mg/dL (08 Nov 2021 11:22)  POCT Blood Glucose.: 88 mg/dL (08 Nov 2021 06:15)  POCT Blood Glucose.: 124 mg/dL (07 Nov 2021 22:22)  POCT Blood Glucose.: 129 mg/dL (07 Nov 2021 16:50)    I&O's Summary    07 Nov 2021 07:01  -  08 Nov 2021 07:00  --------------------------------------------------------  IN: 0 mL / OUT: 2450 mL / NET: -2450 mL    08 Nov 2021 07:01  -  08 Nov 2021 15:14  --------------------------------------------------------  IN: 150 mL / OUT: 3000 mL / NET: -2850 mL        PHYSICAL EXAM:  Vital Signs Last 24 Hrs  T(C): 36.3 (08 Nov 2021 12:26), Max: 37.1 (07 Nov 2021 21:32)  T(F): 97.4 (08 Nov 2021 12:26), Max: 98.7 (07 Nov 2021 21:32)  HR: 67 (08 Nov 2021 12:26) (59 - 71)  BP: 123/67 (08 Nov 2021 12:26) (116/67 - 140/58)  BP(mean): 78 (08 Nov 2021 11:45) (74 - 81)  RR: 16 (08 Nov 2021 12:26) (10 - 18)  SpO2: 98% (08 Nov 2021 12:26) (94% - 100%)    GENERAL: NAD, well-developed, resting in bed  CHEST/LUNG: Clear to auscultation bilaterally; No wheeze  HEART: Regular rate and rhythm  ABDOMEN: Soft, Nontender, Nondistended  EXTREMITIES: no LE edema  : + armenta CBI clear  PSYCH: Calm  NEUROLOGY: AA, answers questions appropriately  SKIN: No rashes or lesions  LUE fistula +bruit/thrill    LABS:                        10.8   4.25  )-----------( 135      ( 07 Nov 2021 05:25 )             35.7     11-07    136  |  103  |  33<H>  ----------------------------<  102<H>  4.5   |  22  |  2.64<H>    Ca    9.9      07 Nov 2021 05:25  Phos  3.5     11-07  Mg     1.60     11-07    PT/INR - ( 07 Nov 2021 05:25 )   PT: 11.3 sec;   INR: 0.98 ratio    PTT - ( 07 Nov 2021 05:25 )  PTT:43.8 sec    RADIOLOGY & ADDITIONAL TESTS:  Results Reviewed:   Imaging Personally Reviewed:  Electrocardiogram Personally Reviewed:    COORDINATION OF CARE:  Care Discussed with Consultants/Other Providers [Y/N]: urology re overall care  Prior or Outpatient Records Reviewed [Y/N]:

## 2021-11-08 NOTE — BRIEF OPERATIVE NOTE - NSICDXBRIEFPROCEDURE_GEN_ALL_CORE_FT
PROCEDURES:  Cystoscopy with transurethral resection of prostate (TURP) 08-Nov-2021 10:26:23  [place]  Replacement of external ureteral stent 08-Nov-2021 10:31:06  [place]

## 2021-11-08 NOTE — PROGRESS NOTE ADULT - PROBLEM SELECTOR PLAN 6
c/w pepcid 20 mg qd.    -monitor loose stools- pt reports loose stools, no leukocytosis, cont to monitor,  team aware c/w pepcid 20 mg qd.

## 2021-11-08 NOTE — PROGRESS NOTE ADULT - PROBLEM SELECTOR PLAN 1
-pt with urinary retention d/t BPH requiring armenta cath for the past two months, pt reports symptoms got worse after radiation therapy for prostate CA  -admitted for abx prior to TURP on 11/8  -management per , on ertapenem. -pt with urinary retention d/t BPH requiring armenta cath for the past two months, pt reports symptoms got worse after radiation therapy for prostate CA  -admitted for abx prior to TURP on 11/8, now s/p procedure  -management per , on ertapenem.

## 2021-11-08 NOTE — PROGRESS NOTE ADULT - PROBLEM SELECTOR PLAN 2
Pt. with CKD stage4. As per pt. Scr ranges between 2.4 -3.5. Scr was 2.79 on admission on 11/5/21. Scr is at 2.64 today. Frequent bladder scan to rule out retention. Monitor labs and urine output. Avoid any potential nephrotoxins. Dose medications as per eGFR.     If you have any questions, please feel free to contact me  Geoff Ramos  Nephrology Fellow  [phone]  (After 5pm or on weekends please page the on-call fellow).

## 2021-11-08 NOTE — PROGRESS NOTE ADULT - PROBLEM SELECTOR PLAN 2
-recent admission for pseudomonas UTI in august at [facility], treated with zosyn 8/21-8/25  -Ucx 8/20 pseudomonas was resistant to quinolone and intermediate to imipenem, Bcx 8/20 no growth   -started on ertapenem per   -c/w prophylactic bactrim and allopurinol. -recent admission for pseudomonas UTI in august at [facility], treated with zosyn 8/21-8/25  -Ucx 8/20 pseudomonas was resistant to quinolone and intermediate to imipenem, Bcx 8/20 no growth   -ertapenem per   -c/w prophylactic bactrim and allopurinol.    -monitor loose stools- pt reports loose stools, no leukocytosis, cont to monitor -->>>diarrhea - f/u Cdiff...

## 2021-11-08 NOTE — PROGRESS NOTE ADULT - ASSESSMENT
72 yo M admitted 11/5 for IV abx prior to planned TURP 11/8, treated successfully for hyperkalemia last evening, nephrology consult obtained    Plan:  -OR today for TURP  -Keep NPO  -f/u GI function and C Diff test results  -f/u tacrolimus level  -continue low K+ diet  -continue ertapenem  -f/u nephrology  -f/u medicine

## 2021-11-08 NOTE — PROGRESS NOTE ADULT - SUBJECTIVE AND OBJECTIVE BOX
Subjective  Patient feels well this morning. Denies fevers, chills, nausea, vomiting. NPO since midnight. COVID negative yesterday. Less diarrhea overnight in comparison to yesterday.     Objective    Vital signs  T(F): , Max: 98.7 (11-07-21 @ 21:32)  HR: 66 (11-08-21 @ 05:54)  BP: 131/63 (11-08-21 @ 05:54)  SpO2: 99% (11-08-21 @ 05:54)  Wt(kg): --    Output     OUT:    Indwelling Catheter - Urethral (mL): 2250 mL  Total OUT: 2250 mL    Total NET: -2250 mL      OUT:    Indwelling Catheter - Urethral (mL): 1150 mL    Voided (mL): 800 mL  Total OUT: 1950 mL    Total NET: -1950 mL    Gen: NAD  Abd: soft, nontender, nondistended  : armenta secured in place, draining CYU    Labs      11-07 @ 05:25    WBC 4.25  / Hct 35.7  / SCr 2.64

## 2021-11-08 NOTE — PROGRESS NOTE ADULT - ASSESSMENT
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/p treatment, was on dialysis for 3 years before DDRT at [facility] in 2018, ureteral stricture of transplant kidney s/p ureteral stent, prostate CA s/p radiation therapy, recurrent Pseudomonal UTIs (hospitalized 6/21; 8/21), previous SBO s/p ileostomy w/ reversal 2017, BPH, recent admission at [facility] for pseudomonal UTI and CHELA due to urinary retention requiring indwelling armenta catheter for the past 2 months, admitted for antibiotics prior to TURP on 11/8 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  Recent admission at [facility] for pseudomonal UTI and CHELA due to urinary retention requiring indwelling armenta catheter for the past 2 months, admitted for antibiotics prior to TURP on 11/8.  Now s/p TURP, ureteral stent in the transplant ureter at a tortuous angle. 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  Recent admission at [facility] for pseudomonal UTI and CHELA due to urinary retention requiring indwelling armenta catheter for the past 2 months, admitted for antibiotics prior to TURP on 11/8.  Now s/p TURP, ureteral stent in the transplant ureter at a tortuous angle, exchanged

## 2021-11-09 ENCOUNTER — TRANSCRIPTION ENCOUNTER (OUTPATIENT)
Age: 71
End: 2021-11-09

## 2021-11-09 VITALS
SYSTOLIC BLOOD PRESSURE: 132 MMHG | OXYGEN SATURATION: 100 % | TEMPERATURE: 98 F | DIASTOLIC BLOOD PRESSURE: 57 MMHG | RESPIRATION RATE: 17 BRPM | HEART RATE: 70 BPM

## 2021-11-09 LAB
ANION GAP SERPL CALC-SCNC: 9 MMOL/L — SIGNIFICANT CHANGE UP (ref 7–14)
BUN SERPL-MCNC: 26 MG/DL — HIGH (ref 7–23)
CALCIUM SERPL-MCNC: 9.7 MG/DL — SIGNIFICANT CHANGE UP (ref 8.4–10.5)
CHLORIDE SERPL-SCNC: 105 MMOL/L — SIGNIFICANT CHANGE UP (ref 98–107)
CO2 SERPL-SCNC: 23 MMOL/L — SIGNIFICANT CHANGE UP (ref 22–31)
CREAT SERPL-MCNC: 2.28 MG/DL — HIGH (ref 0.5–1.3)
GLUCOSE BLDC GLUCOMTR-MCNC: 122 MG/DL — HIGH (ref 70–99)
GLUCOSE BLDC GLUCOMTR-MCNC: 205 MG/DL — HIGH (ref 70–99)
GLUCOSE SERPL-MCNC: 146 MG/DL — HIGH (ref 70–99)
HCT VFR BLD CALC: 35.6 % — LOW (ref 39–50)
HGB BLD-MCNC: 11.7 G/DL — LOW (ref 13–17)
MCHC RBC-ENTMCNC: 31.2 PG — SIGNIFICANT CHANGE UP (ref 27–34)
MCHC RBC-ENTMCNC: 32.9 GM/DL — SIGNIFICANT CHANGE UP (ref 32–36)
MCV RBC AUTO: 94.9 FL — SIGNIFICANT CHANGE UP (ref 80–100)
NRBC # BLD: 0 /100 WBCS — SIGNIFICANT CHANGE UP
NRBC # FLD: 0 K/UL — SIGNIFICANT CHANGE UP
PLATELET # BLD AUTO: 133 K/UL — LOW (ref 150–400)
POTASSIUM SERPL-MCNC: 4.4 MMOL/L — SIGNIFICANT CHANGE UP (ref 3.5–5.3)
POTASSIUM SERPL-SCNC: 4.4 MMOL/L — SIGNIFICANT CHANGE UP (ref 3.5–5.3)
RBC # BLD: 3.75 M/UL — LOW (ref 4.2–5.8)
RBC # FLD: 14.5 % — SIGNIFICANT CHANGE UP (ref 10.3–14.5)
SODIUM SERPL-SCNC: 137 MMOL/L — SIGNIFICANT CHANGE UP (ref 135–145)
WBC # BLD: 5.27 K/UL — SIGNIFICANT CHANGE UP (ref 3.8–10.5)
WBC # FLD AUTO: 5.27 K/UL — SIGNIFICANT CHANGE UP (ref 3.8–10.5)

## 2021-11-09 PROCEDURE — 99232 SBSQ HOSP IP/OBS MODERATE 35: CPT

## 2021-11-09 RX ORDER — CALCIUM CARBONATE 500(1250)
1 TABLET ORAL
Qty: 0 | Refills: 0 | DISCHARGE

## 2021-11-09 RX ORDER — CALCITRIOL 0.5 UG/1
1 CAPSULE ORAL
Qty: 0 | Refills: 0 | DISCHARGE

## 2021-11-09 RX ORDER — DOCUSATE SODIUM 100 MG
1 CAPSULE ORAL
Qty: 0 | Refills: 0 | DISCHARGE

## 2021-11-09 RX ORDER — INFLUENZA VIRUS VACCINE 15; 15; 15; 15 UG/.5ML; UG/.5ML; UG/.5ML; UG/.5ML
0.7 SUSPENSION INTRAMUSCULAR ONCE
Refills: 0 | Status: COMPLETED | OUTPATIENT
Start: 2021-11-09 | End: 2021-11-09

## 2021-11-09 RX ADMIN — Medication 1 APPLICATION(S): at 06:14

## 2021-11-09 RX ADMIN — Medication 30 MILLIGRAM(S): at 06:17

## 2021-11-09 RX ADMIN — VALGANCICLOVIR 450 MILLIGRAM(S): 450 TABLET, FILM COATED ORAL at 12:15

## 2021-11-09 RX ADMIN — HEPARIN SODIUM 5000 UNIT(S): 5000 INJECTION INTRAVENOUS; SUBCUTANEOUS at 06:13

## 2021-11-09 RX ADMIN — Medication 100 MILLIGRAM(S): at 12:16

## 2021-11-09 RX ADMIN — ERTAPENEM SODIUM 100 MILLIGRAM(S): 1 INJECTION, POWDER, LYOPHILIZED, FOR SOLUTION INTRAMUSCULAR; INTRAVENOUS at 13:10

## 2021-11-09 RX ADMIN — MYCOPHENOLATE MOFETIL 250 MILLIGRAM(S): 250 CAPSULE ORAL at 06:13

## 2021-11-09 RX ADMIN — Medication 2: at 11:34

## 2021-11-09 RX ADMIN — INFLUENZA VIRUS VACCINE 0.7 MILLILITER(S): 15; 15; 15; 15 SUSPENSION INTRAMUSCULAR at 15:51

## 2021-11-09 RX ADMIN — HEPARIN SODIUM 5000 UNIT(S): 5000 INJECTION INTRAVENOUS; SUBCUTANEOUS at 13:10

## 2021-11-09 RX ADMIN — MAGNESIUM OXIDE 400 MG ORAL TABLET 400 MILLIGRAM(S): 241.3 TABLET ORAL at 12:16

## 2021-11-09 RX ADMIN — TACROLIMUS 5 MILLIGRAM(S): 5 CAPSULE ORAL at 06:14

## 2021-11-09 RX ADMIN — ATENOLOL 25 MILLIGRAM(S): 25 TABLET ORAL at 06:17

## 2021-11-09 RX ADMIN — PREGABALIN 1000 MICROGRAM(S): 225 CAPSULE ORAL at 12:16

## 2021-11-09 RX ADMIN — Medication 400 MILLIGRAM(S): at 06:13

## 2021-11-09 RX ADMIN — FAMOTIDINE 20 MILLIGRAM(S): 10 INJECTION INTRAVENOUS at 12:16

## 2021-11-09 RX ADMIN — Medication 50 MILLIGRAM(S): at 12:16

## 2021-11-09 NOTE — DISCHARGE NOTE PROVIDER - NSDCMRMEDTOKEN_GEN_ALL_CORE_FT
allopurinol 100 mg oral tablet: 1 tab(s) orally once a day  atenolol 25 mg oral tablet: 1 tab(s) orally once a day AM  famotidine 20 mg oral tablet: 1 tab(s) orally once a day  glimepiride 2 mg oral tablet: 1 tab(s) orally once a day  Januvia 25 mg oral tablet: 1 tab(s) orally once a day AM  magnesium oxide 400 mg oral tablet: 1 tab(s) orally once a day  mycophenolate mofetil 250 mg oral capsule: 1 cap(s) orally 2 times a day  NIFEdipine 30 mg oral tablet, extended release: 1 tab(s) orally once a day  predniSONE 5 mg oral tablet: 1 tab(s) orally once a day  Silvadene 1% topical cream: Apply topically to affected area 2 times a day   tamsulosin 0.4 mg oral capsule: 2 cap(s) orally once a day  valGANciclovir: 450 milligram(s) orally once a day  vitamin b complex: 1 tab(s) orally once a day  Vitamin B-12: 1 tab(s) orally once a day  Vitamin B6: 1 tab(s) orally once a day

## 2021-11-09 NOTE — PROGRESS NOTE ADULT - ASSESSMENT
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  Recent admission at [facility] for pseudomonal UTI and CHELA due to urinary retention requiring indwelling armenta catheter for the past 2 months, admitted for antibiotics prior to TURP on 11/8.  Now s/p TURP, ureteral stent in the transplant ureter at a tortuous angle, exchanged

## 2021-11-09 NOTE — PROGRESS NOTE ADULT - ASSESSMENT
ANESTHESIA POSTOP CHECK    71y Male POSTOP DAY 1 S/P Cystoscopy, TURP    Vital Signs Last 24 Hrs  T(C): 36.9 (09 Nov 2021 10:36), Max: 37.2 (09 Nov 2021 06:16)  T(F): 98.4 (09 Nov 2021 10:36), Max: 98.9 (09 Nov 2021 06:16)  HR: 70 (09 Nov 2021 10:36) (62 - 76)  BP: 132/57 (09 Nov 2021 10:36) (122/58 - 155/67)  BP(mean): --  RR: 17 (09 Nov 2021 10:36) (17 - 18)  SpO2: 100% (09 Nov 2021 10:36) (99% - 100%)  I&O's Summary    08 Nov 2021 07:01  -  09 Nov 2021 07:00  --------------------------------------------------------  IN: 150 mL / OUT: 4000 mL / NET: -3850 mL    09 Nov 2021 07:01  -  09 Nov 2021 13:07  --------------------------------------------------------  IN: 0 mL / OUT: 250 mL / NET: -250 mL        [X] NO APPARENT ANESTHESIA COMPLICATIONS      Comments:

## 2021-11-09 NOTE — DISCHARGE NOTE PROVIDER - NSDCCPCAREPLAN_GEN_ALL_CORE_FT
PRINCIPAL DISCHARGE DIAGNOSIS  Diagnosis: BPH with urinary obstruction  Assessment and Plan of Treatment: No heavy lifting or straining; make sure bowel movements are easy, take fiber or miralax  Do not take calcium until you see Dr Bhakta, and ask him when to re-start it  F/U with Dr. Phillip in a few weeks  Call office for fever over 101, or bright red urine

## 2021-11-09 NOTE — PROGRESS NOTE ADULT - PROVIDER SPECIALTY LIST ADULT
Anesthesia
Urology
Nephrology
Hospitalist

## 2021-11-09 NOTE — DISCHARGE NOTE PROVIDER - NSDCFUSCHEDAPPT_GEN_ALL_CORE_FT
JUAN CARLOS COPELAND ; 11/10/2021 ; NPP Nephro [address] [facility] JUAN CARLOS Allison ; 01/19/2022 ; NPP Rad Med [address] [place] [address]

## 2021-11-09 NOTE — DISCHARGE NOTE PROVIDER - CARE PROVIDER_API CALL
John Phillip)  Urology  [address] [place] [address], [place] [address]  Phone: [phone]  Fax: [phone]  Follow Up Time:

## 2021-11-09 NOTE — PROGRESS NOTE ADULT - PROBLEM SELECTOR PLAN 1
-pt with urinary retention d/t BPH requiring armenta cath for the past two months, pt reports symptoms got worse after radiation therapy for prostate CA  -admitted for abx prior to procedure --> 11/8 underwent TURP, ureteral stent in the transplant ureter at a tortuous angle, exchanged  -management per , on ertapenem.

## 2021-11-09 NOTE — DISCHARGE NOTE PROVIDER - HOSPITAL COURSE
72 yo M admitted 11/5 for IV abx prior to planned TURP 11/8, treated successfully for hyperkalemia last evening, nephrology consult obtained  11/9 - POD 1 from TURP and transplant kidney stent replacement. Urine color light pink this morning after CBI clamped at 5 AM. Velez removed.   Pt voided very light pink about 250 cc, about 100cc PVR; home today

## 2021-11-09 NOTE — DISCHARGE NOTE NURSING/CASE MANAGEMENT/SOCIAL WORK - NSDPDISTO_GEN_ALL_CORE
Pt. is afebrile and offers no complaints. In no acute distress. Patient is ambulating, tolerating diet well, voiding in adequate amounts. Left AVF with + bruit and + thrill./Home Consult

## 2021-11-09 NOTE — PROGRESS NOTE ADULT - ASSESSMENT
72 yo M admitted 11/5 for IV abx prior to planned TURP 11/8, treated successfully for hyperkalemia last evening, nephrology consult obtained  11/9 - POD 1 from TURP and transplant kidney stent replacement. Urine color light pink this morning after CBI clamped at 5 AM. Velez removed.     Plan:  - CBC, BMP this AM  - TOV/PVR  -continue regular diet  -f/u tacrolimus level  -continue low K+ diet  -continue ertapenem --> no need for home abx  -f/u nephrology  -f/u medicine  -possible discharge home today pending PVR and TOV.

## 2021-11-09 NOTE — PROGRESS NOTE ADULT - PROBLEM SELECTOR PROBLEM 1
BPH with urinary obstruction
BPH with urinary obstruction
Renal transplant recipient
BPH with urinary obstruction
BPH with urinary obstruction

## 2021-11-09 NOTE — DISCHARGE NOTE NURSING/CASE MANAGEMENT/SOCIAL WORK - NSDCVIVACCINE_GEN_ALL_CORE_FT
influenza, injectable, quadrivalent, preservative free; 15-Sep-2016 13:35; Zulay Diego (RN); [employer]; [id number]; IntraMuscular; Deltoid Right.; 0.5 milliLiter(s); VIS (VIS Published: 07-Aug-2015, VIS Presented: 15-Sep-2016);    influenza, injectable, quadrivalent, preservative free; 15-Sep-2016 13:35; Zulay Diego (RN); [employer]; [id number]; IntraMuscular; Deltoid Right.; 0.5 milliLiter(s); VIS (VIS Published: 07-Aug-2015, VIS Presented: 15-Sep-2016);   influenza, high-dose, quadrivalent; 09-Nov-2021 15:51; Anna Taylor (RN); [employer]; Gu132in (Exp. Date: 30-Jun-2022); IntraMuscular; Deltoid Right.; 0.7 milliLiter(s); VIS (VIS Published: 06-Aug-2021, VIS Presented: 09-Nov-2021);

## 2021-11-09 NOTE — PROGRESS NOTE ADULT - PROBLEM SELECTOR PLAN 4
c/w nifedipine 30mg qd and atenolol 25 mg qd  monitor bp  pt with normal LVEF 60% on echo and negative stress test in Dec 2020.

## 2021-11-09 NOTE — DISCHARGE NOTE NURSING/CASE MANAGEMENT/SOCIAL WORK - NSDCPNINST_GEN_ALL_CORE
Make a follow up appointment with Dr. Phillip. Call if you develop a fever, or if you have difficulty urinating. Your A1C= 6.8. Continue to follow a consistent carbohydrate diet and take your medications for diabetes.

## 2021-11-09 NOTE — PROGRESS NOTE ADULT - ATTENDING COMMENTS
TURP scheduled today to relieve bladder outlet obstruction  continue abx  f/u cdiff results
urine clear off CBI  d/c armenta  voiding trial
CKD stage 4  s/p renal transplant on immunosupressant meds  Recurrent UTI  Obstructive uropathy    Cont current medications, monitor labs and urine output. Avoid nephrotoxins.

## 2021-11-09 NOTE — PROGRESS NOTE ADULT - PROBLEM SELECTOR PLAN 3
H/o b/l nephrectomies for RCC in 2015, was on HD for 3 years before DDRT in 2018  -c/w home transplant immunosuppressive meds: prednisone 5 mg qd, cellcept 250 mg bid, and Envarsus XR 1mg and 4mg (total 5 mg) qd  -pt now has CKD4, last creat 2.69 on 10/22  -recommend nephrology consult, outpt transplant nephrologist is Dr. Bhakta  -last renal duplex (8/20) showed mild fullness of the transplant collecting system and urothelial thickening, similar in appearance to the prior study dated 8/1/2021.  -check prograf level 30 minutes prior to AM dose  -IVF, avoid nephrotoxins, dose meds per renal fxn  -hold home calcitriol pending lab, last Ca mildly elevated to 10.6 on 10/22.
H/o b/l nephrectomies for RCC in 2015, was on HD for 3 years before DDRT in 2018  -c/w home transplant immunosuppressive meds: prednisone 5 mg qd, cellcept 250 mg bid, and Envarsus XR 1mg and 4mg (total 5 mg) qd  -pt now has CKD4, last creat 2.69 on 10/22  -recommend nephrology consult, outpt transplant nephrologist is Dr. Bhakta  -last renal duplex (8/20) showed mild fullness of the transplant collecting system and urothelial thickening, similar in appearance to the prior study dated 8/1/2021.  -check prograf level 30 minutes prior to AM dose  -IVF, avoid nephrotoxins, dose meds per renal fxn  -hold home calcitriol pending lab, last Ca mildly elevated to 10.6 on 10/22.....Ca normalized, off calcitriol, f/u with Dr. Bhakta tomorrow...
H/o b/l nephrectomies for RCC in 2015, was on HD for 3 years before DDRT in 2018  -c/w home transplant immunosuppressive meds: prednisone 5 mg qd, cellcept 250 mg bid, and Envarsus XR 1mg and 4mg (total 5 mg) qd  -pt now has CKD4, last creat 2.69 on 10/22  -recommend nephrology consult, outpt transplant nephrologist is Dr. Bhakta  -last renal duplex (8/20) showed mild fullness of the transplant collecting system and urothelial thickening, similar in appearance to the prior study dated 8/1/2021.  -check prograf level 30 minutes prior to AM dose  -IVF, avoid nephrotoxins, dose meds per renal fxn  -hold home calcitriol pending lab, last Ca mildly elevated to 10.6 on 10/22.
H/o b/l nephrectomies for RCC in 2015, was on HD for 3 years before DDRT in 2018  -c/w home transplant immunosuppressive meds: prednisone 5 mg qd, cellcept 250 mg bid, and Envarsus XR 1mg and 4mg (total 5 mg) qd  -pt now has CKD4, last creat 2.69 on 10/22  -recommend nephrology consult, outpt transplant nephrologist is Dr. Bhakta  -last renal duplex (8/20) showed mild fullness of the transplant collecting system and urothelial thickening, similar in appearance to the prior study dated 8/1/2021.  -check prograf level 30 minutes prior to AM dose  -IVF, avoid nephrotoxins, dose meds per renal fxn  -hold home calcitriol pending lab, last Ca mildly elevated to 10.6 on 10/22.

## 2021-11-09 NOTE — PROGRESS NOTE ADULT - PROBLEM/PLAN-3
DISPLAY PLAN FREE TEXT
no Active ROM deficits were identified
DISPLAY PLAN FREE TEXT

## 2021-11-09 NOTE — PROGRESS NOTE ADULT - SUBJECTIVE AND OBJECTIVE BOX
ANESTHESIA POSTOP CHECK    71y Male POSTOP DAY 1 S/P Cystoscopy, TURP, Replacement of ureteral stent    Vital Signs Last 24 Hrs  T(C): 36.9 (09 Nov 2021 10:36), Max: 37.2 (09 Nov 2021 06:16)  T(F): 98.4 (09 Nov 2021 10:36), Max: 98.9 (09 Nov 2021 06:16)  HR: 70 (09 Nov 2021 10:36) (62 - 76)  BP: 132/57 (09 Nov 2021 10:36) (122/58 - 155/67)  BP(mean): --  RR: 17 (09 Nov 2021 10:36) (17 - 18)  SpO2: 100% (09 Nov 2021 10:36) (99% - 100%)  I&O's Summary    08 Nov 2021 07:01  -  09 Nov 2021 07:00  --------------------------------------------------------  IN: 150 mL / OUT: 4000 mL / NET: -3850 mL    09 Nov 2021 07:01  -  09 Nov 2021 13:07  --------------------------------------------------------  IN: 0 mL / OUT: 250 mL / NET: -250 mL        [X] NO APPARENT ANESTHESIA COMPLICATIONS      Comments:    ANESTHESIA POSTOP CHECK    71y Male POSTOP DAY 1 S/P Cystoscopy, TURP, Replacement of transplant kidney stent    Vital Signs Last 24 Hrs  T(C): 36.9 (09 Nov 2021 10:36), Max: 37.2 (09 Nov 2021 06:16)  T(F): 98.4 (09 Nov 2021 10:36), Max: 98.9 (09 Nov 2021 06:16)  HR: 70 (09 Nov 2021 10:36) (62 - 76)  BP: 132/57 (09 Nov 2021 10:36) (122/58 - 155/67)  BP(mean): --  RR: 17 (09 Nov 2021 10:36) (17 - 18)  SpO2: 100% (09 Nov 2021 10:36) (99% - 100%)  I&O's Summary    08 Nov 2021 07:01  -  09 Nov 2021 07:00  --------------------------------------------------------  IN: 150 mL / OUT: 4000 mL / NET: -3850 mL    09 Nov 2021 07:01  -  09 Nov 2021 13:07  --------------------------------------------------------  IN: 0 mL / OUT: 250 mL / NET: -250 mL        [X] NO APPARENT ANESTHESIA COMPLICATIONS      Comments:

## 2021-11-09 NOTE — PROGRESS NOTE ADULT - PROBLEM SELECTOR PLAN 2
-recent admission for pseudomonas UTI in august at [facility], treated with zosyn 8/21-8/25  -Ucx 8/20 pseudomonas was resistant to quinolone and intermediate to imipenem, Bcx 8/20 no growth   -ertapenem per   -c/w prophylactic bactrim and allopurinol.    -monitor loose stools- pt reports loose stools, no leukocytosis, cont to monitor -->>>diarrhea -  Cdiff negative, diarrhea does not seem new, to f/u with his doctor Dr. Bhakta tomorrow as outpt...

## 2021-11-09 NOTE — PROGRESS NOTE ADULT - SUBJECTIVE AND OBJECTIVE BOX
[facility] Division of Hospital Medicine  Estephania Hammer MD  Pager (M-F, 8A-5P):  In-house pager [phone]; Long-range pager [phone]  Other Times:  Please page Hospitalist in Charge -  In-house pager [phone]    Patient is a 71y old  Male who presents with a chief complaint of surgery (08 Nov 2021 15:14)      SUBJECTIVE / OVERNIGHT EVENTS: Doing ok.  Reports some drops of urination since armenta removed.  Has small amount loose stool every few hours, but says this is not new.  Has had for months.  Had stool studies done as outpatient that were negative.  Told to take fiber which helped but he didn't continue taking it.  Has a f/u appt tomorrow with Dr. Bhakta.  ADDITIONAL REVIEW OF SYSTEMS:    MEDICATIONS  (STANDING):  acetaminophen   IVPB .. 1000 milliGRAM(s) IV Intermittent every 8 hours  allopurinol 100 milliGRAM(s) Oral daily  ATENolol  Tablet 25 milliGRAM(s) Oral daily  cyanocobalamin 1000 MICROGram(s) Oral daily  dextrose 40% Gel 15 Gram(s) Oral once  dextrose 5%. 1000 milliLiter(s) (50 mL/Hr) IV Continuous <Continuous>  dextrose 5%. 1000 milliLiter(s) (100 mL/Hr) IV Continuous <Continuous>  dextrose 50% Injectable 25 Gram(s) IV Push once  dextrose 50% Injectable 12.5 Gram(s) IV Push once  dextrose 50% Injectable 25 Gram(s) IV Push once  ertapenem  IVPB      ertapenem  IVPB 500 milliGRAM(s) IV Intermittent every 24 hours  famotidine    Tablet 20 milliGRAM(s) Oral daily  glucagon  Injectable 1 milliGRAM(s) IntraMuscular once  heparin   Injectable 5000 Unit(s) SubCutaneous every 8 hours  influenza  Vaccine (HIGH DOSE) 0.7 milliLiter(s) IntraMuscular once  insulin lispro (ADMELOG) corrective regimen sliding scale   SubCutaneous every 6 hours  insulin lispro (ADMELOG) corrective regimen sliding scale   SubCutaneous three times a day before meals  insulin lispro (ADMELOG) corrective regimen sliding scale   SubCutaneous at bedtime  magnesium oxide 400 milliGRAM(s) Oral daily  mycophenolate mofetil 250 milliGRAM(s) Oral two times a day  NIFEdipine XL 30 milliGRAM(s) Oral daily  predniSONE   Tablet 5 milliGRAM(s) Oral daily  pyridoxine 50 milliGRAM(s) Oral daily  silver sulfADIAZINE 1% Cream 1 Application(s) Topical two times a day  tacrolimus ER Tablet (ENVARSUS XR) 5 milliGRAM(s) Oral daily  tamsulosin 0.8 milliGRAM(s) Oral at bedtime  valGANciclovir 450 milliGRAM(s) Oral daily    MEDICATIONS  (PRN):      CAPILLARY BLOOD GLUCOSE      POCT Blood Glucose.: 122 mg/dL (09 Nov 2021 07:23)  POCT Blood Glucose.: 147 mg/dL (08 Nov 2021 22:05)  POCT Blood Glucose.: 129 mg/dL (08 Nov 2021 16:36)  POCT Blood Glucose.: 143 mg/dL (08 Nov 2021 11:22)    I&O's Summary    08 Nov 2021 07:01  -  09 Nov 2021 07:00  --------------------------------------------------------  IN: 150 mL / OUT: 4000 mL / NET: -3850 mL    09 Nov 2021 07:01  -  09 Nov 2021 10:36  --------------------------------------------------------  IN: 0 mL / OUT: 100 mL / NET: -100 mL        PHYSICAL EXAM:  Vital Signs Last 24 Hrs  T(C): 37.2 (09 Nov 2021 06:16), Max: 37.2 (09 Nov 2021 06:16)  T(F): 98.9 (09 Nov 2021 06:16), Max: 98.9 (09 Nov 2021 06:16)  HR: 76 (09 Nov 2021 06:16) (59 - 76)  BP: 155/67 (09 Nov 2021 06:16) (122/58 - 155/67)  BP(mean): 78 (08 Nov 2021 11:45) (78 - 80)  RR: 18 (09 Nov 2021 06:16) (12 - 18)  SpO2: 100% (09 Nov 2021 06:16) (94% - 100%)    GENERAL: sitting up on side of bed eating breakfast, comfortable  CHEST/LUNG: Clear to auscultation bilaterally; No wheeze  HEART: Regular rate and rhythm  ABDOMEN: Soft, Nontender, Nondistended  EXTREMITIES: no LE edema  : armenta has been removed  PSYCH: Calm  NEUROLOGY: AA, answers questions appropriately  SKIN: No rashes or lesions  LUE fistula +bruit/thrill    LABS:                        11.7   5.27  )-----------( 133      ( 09 Nov 2021 09:43 )             35.6     11-09    137  |  105  |  26<H>  ----------------------------<  146<H>  4.4   |  23  |  2.28<H>    Ca    9.7      09 Nov 2021 09:43    RADIOLOGY & ADDITIONAL TESTS:  Results Reviewed:   Imaging Personally Reviewed:  Electrocardiogram Personally Reviewed:    COORDINATION OF CARE:  Care Discussed with Consultants/Other Providers [Y/N]: urology re overall care  Prior or Outpatient Records Reviewed [Y/N]:

## 2021-11-09 NOTE — PROGRESS NOTE ADULT - PROBLEM SELECTOR PLAN 5
hold glimepiride and januvia,   FSBGs been well controlled, hold glimepiride but resume Januvia on d/c  ISS, monitor FS  A1c 6.4%. hold glimepiride and januvia, resume on d/c  FSBGs been well controlled  ISS, monitor FS  A1c 6.4%.

## 2021-11-09 NOTE — DISCHARGE NOTE NURSING/CASE MANAGEMENT/SOCIAL WORK - PATIENT PORTAL LINK FT
You can access the FollowMyHealth Patient Portal offered by [facility] by registering at the following website: [email or web address]. By joining [facility]’s FollowMyHealth portal, you will also be able to view your health information using other applications (apps) compatible with our system.

## 2021-11-09 NOTE — PROVIDER CONTACT NOTE (OTHER) - SITUATION
Pt an extremely hard stick. Unable to draw AM labs at this time, despite 4 attempts from 3 different staff members.

## 2021-11-09 NOTE — PROGRESS NOTE ADULT - SUBJECTIVE AND OBJECTIVE BOX
Subjective  Patient feels well this morning. Diarrhea improved. C. Diff was negative yesterday.     Objective    Vital signs  T(F): , Max: 98.9 (11-09-21 @ 06:16)  HR: 76 (11-09-21 @ 06:16)  BP: 155/67 (11-09-21 @ 06:16)  SpO2: 100% (11-09-21 @ 06:16)  Wt(kg): --    Output     OUT:    Indwelling Catheter - Urethral (mL): 4000 mL  Total OUT: 4000 mL    Total NET: -4000 mL      Gen: NAD  Abd: soft, nontender, nondistended  : armenta secured in place, draining clear light pink urine. CBI was clamped htis AM.

## 2021-11-09 NOTE — PROGRESS NOTE ADULT - PROBLEM SELECTOR PROBLEM 2
Stage 4 chronic kidney disease
History of recurrent UTI (urinary tract infection)

## 2021-11-10 ENCOUNTER — APPOINTMENT (OUTPATIENT)
Dept: NEPHROLOGY | Facility: CLINIC | Age: 71
End: 2021-11-10
Payer: MEDICARE

## 2021-11-10 VITALS
OXYGEN SATURATION: 98 % | BODY MASS INDEX: 28.77 KG/M2 | SYSTOLIC BLOOD PRESSURE: 120 MMHG | DIASTOLIC BLOOD PRESSURE: 60 MMHG | HEART RATE: 97 BPM | TEMPERATURE: 98 F | HEIGHT: 70 IN | WEIGHT: 201 LBS | RESPIRATION RATE: 16 BRPM

## 2021-11-10 LAB — SURGICAL PATHOLOGY STUDY: SIGNIFICANT CHANGE UP

## 2021-11-10 PROCEDURE — 99214 OFFICE O/P EST MOD 30 MIN: CPT

## 2021-11-15 ENCOUNTER — APPOINTMENT (OUTPATIENT)
Dept: COLORECTAL SURGERY | Facility: CLINIC | Age: 71
End: 2021-11-15
Payer: MEDICARE

## 2021-11-15 PROCEDURE — 99024 POSTOP FOLLOW-UP VISIT: CPT

## 2021-11-15 NOTE — HISTORY OF PRESENT ILLNESS
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 He states that his symptoms are significantly improved. He does complain of ongoing diarrhea.

## 2021-11-15 NOTE — PHYSICAL EXAM
[de-identified] : Perianal skin irritation markedly improved. Anal verge fissurectomy site/fulguration sites Still raw

## 2021-12-02 ENCOUNTER — APPOINTMENT (OUTPATIENT)
Dept: UROLOGY | Facility: CLINIC | Age: 71
End: 2021-12-02
Payer: MEDICARE

## 2021-12-02 PROCEDURE — 99024 POSTOP FOLLOW-UP VISIT: CPT

## 2021-12-02 PROCEDURE — 51798 US URINE CAPACITY MEASURE: CPT

## 2021-12-03 LAB
APPEARANCE: CLEAR
BACTERIA: NEGATIVE
BILIRUBIN URINE: NEGATIVE
BLOOD URINE: ABNORMAL
COLOR: NORMAL
GLUCOSE QUALITATIVE U: NEGATIVE
HYALINE CASTS: 0 /LPF
KETONES URINE: NEGATIVE
LEUKOCYTE ESTERASE URINE: ABNORMAL
MICROSCOPIC-UA: NORMAL
NITRITE URINE: NEGATIVE
PH URINE: 6
PROTEIN URINE: ABNORMAL
RED BLOOD CELLS URINE: 82 /HPF
SPECIFIC GRAVITY URINE: 1.02
SQUAMOUS EPITHELIAL CELLS: 2 /HPF
UROBILINOGEN URINE: NORMAL
WHITE BLOOD CELLS URINE: 12 /HPF

## 2021-12-06 LAB — BACTERIA UR CULT: ABNORMAL

## 2021-12-09 ENCOUNTER — APPOINTMENT (OUTPATIENT)
Dept: NEPHROLOGY | Facility: CLINIC | Age: 71
End: 2021-12-09
Payer: MEDICARE

## 2021-12-09 VITALS
OXYGEN SATURATION: 98 % | HEART RATE: 77 BPM | DIASTOLIC BLOOD PRESSURE: 63 MMHG | SYSTOLIC BLOOD PRESSURE: 156 MMHG | WEIGHT: 206 LBS | HEIGHT: 70 IN | RESPIRATION RATE: 16 BRPM | BODY MASS INDEX: 29.49 KG/M2 | TEMPERATURE: 98.5 F

## 2021-12-09 PROCEDURE — 99214 OFFICE O/P EST MOD 30 MIN: CPT

## 2021-12-09 NOTE — HISTORY OF PRESENT ILLNESS
[Diabetes Mellitus] : Diabetes Mellitus [TextBox_42] : Mr. Medrano is a 71 y.o male with a history of ESRD since 2015 after bilateral nephrectomies for RCC s/p DDRT 2018 who presents for transplant relisting.  Of note his current eGFR is about 22.  He follows with Dr. Bonilla.\par He has known DM  since age 41, HTN.\par Has b/l nephrectomies for RCC,  left on 2015 and right 2015.\par H/o intestinal blockage and was hospitalized at [facility] in 2017. Had surgical correction. Has left UE AVF.\par \par s/p  donor kidney transplant on 18.\par HCV donor. 40 yrs old male. cold ischemia time 23 hrs. Had delayed graft function but kidney eventually worked with best creatinine 1.9. \par \par PT had several admissions post transplant.  Early on he was readmitted for anemia and hematoma. Required 2 kidney biopsies which revealed few oxalate crystals and borderline rejection for which he was treated with solumedrol 375 and prednisone taper.  He is on bicitra to reduce risk of oxalate stones.  Also admitted for CHELA few times, UTI's and CMV. \par Pt admitted for CHELA and hydronephrosis 2020. Had nephrostomy tube then NU tube placed. then admitted for UTI. Went for ureteral stricture dilation and replacement of NU tube on 8/3/20.\par \par Pt was treated for UTI in 2020.  Pt had NU tube check in September, was not changed. Going to have it rechecked/ removed tomorrow.  Creatinine now about 3.  \par \par Social: Non smoker, no alcohol or drug use.  Currently working, . \par \par Able to walk 5-6 blocks, can climb stairs.\par ROS: Has no shortness of breath on exertion. \par Fam: Parents are . Father had DM, Mother had breast cancer.\par Has no children; Wife, healthy, 61 years. 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  Pt diagnosed with Covid, wife was diagnosed with Covid on Jan 28th 2021.  On 29th pt was tested in an urgent care, tested positive and went to [place].  Had no symptoms.  Received the monoclonal antibody on the 29th.  Creatinine in 3's in [facility] and he was admitted and received 5 days of IV antibiotics for urine infection.\par \par Pt admitted June 2nd 2021 for CHELA and hyperkalemia.  Found to have UTI and treated.  Had pseudomonas in urine and received zosyn.  CHELA improved before discharge.  Creatinine decreased to 2.7 before discharge.  Stent internalized\par \par Receiving RT therapy for prostate. Has had admissions with urinary retention after RT started.  \par Saw an oncologist who recently performed labs and mentioned that creatinine was high.  Pt still has armenta catheter and was advised to have a TURP.  Pt will be seeing Dr. Collins this Thursday.  Had a positive urine culture end of September.  \par \par friend Dr. Sean Salinas [phone]. [de-identified] : Pt was admitted to [facility] 11/5 for IV abx prior to planned TURP 11/8.  11/9 - POD 1 from TURP and transplant kidney stent replacement.  Velez removed. Pt voided very light pink about 250 cc, about 100cc PVR; was sent home.  Saw colorectal surgery and had botox injection last week.  Fissure pain improved.  Has some pink urine today.  Creatinine was stable at 2.6 then 2.2 at discharge.  Hb was 11.  Tac trough was 6.

## 2021-12-09 NOTE — HISTORY OF PRESENT ILLNESS
[80: Normal activity with effort; some signs or symptoms of disease.] : 80: Normal activity with effort; some signs or symptoms of disease.  [Diabetes Mellitus] : Diabetes Mellitus [TextBox_42] : Mr. Medrano is a 71 y.o male with a history of ESRD since 2015 after bilateral nephrectomies for RCC s/p DDRT 2018 who presents for transplant relisting.  Of note his current eGFR is about 22.  He follows with Dr. Bonilla.\par He has known DM  since age 41, HTN.\par Has b/l nephrectomies for RCC,  left on 2015 and right 2015.\par H/o intestinal blockage and was hospitalized at [facility] in 2017. Had surgical correction. Has left UE AVF.\par \par s/p  donor kidney transplant on 18.\par HCV donor. 40 yrs old male. cold ischemia time 23 hrs. Had delayed graft function but kidney eventually worked with best creatinine 1.9. \par \par PT had several admissions post transplant.  Early on he was readmitted for anemia and hematoma. Required 2 kidney biopsies which revealed few oxalate crystals and borderline rejection for which he was treated with solumedrol 375 and prednisone taper.  He is on bicitra to reduce risk of oxalate stones.  Also admitted for CHELA few times, UTI's and CMV. \par Pt admitted for CHELA and hydronephrosis 2020. Had nephrostomy tube then NU tube placed. then admitted for UTI. Went for ureteral stricture dilation and replacement of NU tube on 8/3/20.\par \par Pt was treated for UTI in 2020.  Pt had NU tube check in September, was not changed. Going to have it rechecked/ removed tomorrow.  Creatinine now about 3.  \par \par Social: Non smoker, no alcohol or drug use.  Currently working, . \par \par Able to walk 5-6 blocks, can climb stairs.\par ROS: Has no shortness of breath on exertion. \par Fam: Parents are . Father had DM, Mother had breast cancer.\par Has no children; Wife, healthy, 61 years. 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  Pt diagnosed with Covid, wife was diagnosed with Covid on Jan 28th 2021.  On 29th pt was tested in an urgent care, tested positive and went to [place].  Had no symptoms.  Received the monoclonal antibody on the 29th.  Creatinine in 3's in [facility] and he was admitted and received 5 days of IV antibiotics for urine infection.\par \par Pt admitted June 2nd 2021 for CHELA and hyperkalemia.  Found to have UTI and treated.  Had pseudomonas in urine and received zosyn.  CHELA improved before discharge.  Creatinine decreased to 2.7 before discharge.  Stent internalized\par \par Receiving RT therapy for prostate. Has had admissions with urinary retention after RT started.  \par Saw an oncologist who recently performed labs and mentioned that creatinine was high.  Pt still has armenta catheter and was advised to have a TURP.  Pt will be seeing Dr. Collins this Thursday.  Had a positive urine culture end of September.  \par \par friend Dr. Sean Salinas [phone]. [de-identified] : Pt was admitted to [facility] 11/5 for IV abx prior to planned TURP 11/8.  11/9 - POD 1 from TURP and transplant kidney stent replacement.  Velez removed. Pt voided very light pink about 250 cc. \par \par Pt has followed up with Dr. Phillip, urinating well, had 80 cc PVR.  Has positive urine culture but not > 100,000 colonies - being started on antibiotic.  Pt is asymptomatic.  Still has diarrhea and occasional fecal incontinence.  Will see GI and colorectal next week. RT completed.

## 2021-12-09 NOTE — ASSESSMENT
[FreeTextEntry1] : 1. CKD of kidney transplantation - Pts aaron creatinine 1.9 but had CHELA with pyelonephritis. Has had multiple episodes of CHELA.  Pt in process of relisting but on hold due to prostate cancer.  Cell free DNA 0.2% in October end.  Last creatinine 2.2.  Pt also has internal JJ stent which was exchanged this 11/8/21. \par 2. Immunosuppression - simulect induction, tacrolimus target 5-7, MMF 250mgs BID.  \par 3. DM2 - on januvia and glimepiride. BG controlled at home.  A1c at goal. \par 4. HTN - controlled\par 5. HCV - genotype 3a. completed Epclusa. Last vl note detected. \par 6. Anemia - due to CKD/CHELA.  last Hb at goal. \par 7. Chronic diarrhea - on and off on lomotil prn. Has seen GI and had a colonoscopy in 2019. Has history of small bowel resection which may be the cause of diarrhea. 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 \par 9.  UTI - last culture positive with MDR organism.  Received antibiotics before ureteral stent changes.  \par 10.  COVID19 -  Resolved.  Received monoclonal antibody and recently vaccinated.  \par 11.  Prostate cancer - Pt on androgen depletion and completed RT.\par 12.  Urine retention - now s/p TURP.  Has mild hematuria - expected.  Pt told if he cannot urinate, has bright red blood or clots to go to ER and call urology.  \par \par f/u in 1 month.

## 2021-12-09 NOTE — ASSESSMENT
[FreeTextEntry1] : 1. CKD of kidney transplantation - Pts aaron creatinine 1.9 but had CHELA with pyelonephritis. Has had multiple episodes of CHELA.  Pt in process of relisting but on hold due to prostate cancer.  Cell free DNA 0.2% in October end.  Last creatinine 2.2 in hospital.  Pt also has internal JJ stent which was exchanged this 11/8/21. \par 2. Immunosuppression - simulect induction, tacrolimus target 5-7, MMF 250mgs BID.  \par 3. DM2 - on januvia and glimepiride. BG controlled at home.  A1c at goal. \par 4. HTN - controlled at home.\par 5. HCV - genotype 3a. completed Epclusa. Last vl note detected. \par 6. Anemia - due to CKD/CHELA.  last Hb at goal. \par 7. Chronic diarrhea - on and off on lomotil prn. Has seen GI and had a colonoscopy in 2019. Has history of small bowel resection which may be the cause of diarrhea. 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 \par 9.  UTI - last culture positive with MDR organism. Started on linezolid by Dr. Phillip.  \par 10.  COVID19 -  Resolved.  Received monoclonal antibody and recently vaccinated.  \par 11.  Prostate cancer - Completed androgen depletion and completed RT.\par 12.  Urine retention - now s/p TURP.  PVR greatly improved and he is urinating keiko. \par \par f/u in 2 months.

## 2021-12-10 ENCOUNTER — NON-APPOINTMENT (OUTPATIENT)
Age: 71
End: 2021-12-10

## 2021-12-10 LAB
25(OH)D3 SERPL-MCNC: 27.4 NG/ML
ALBUMIN SERPL ELPH-MCNC: 4.1 G/DL
ALP BLD-CCNC: 98 U/L
ALT SERPL-CCNC: 23 U/L
ANION GAP SERPL CALC-SCNC: 17 MMOL/L
APPEARANCE: ABNORMAL
AST SERPL-CCNC: 24 U/L
BACTERIA: NEGATIVE
BASOPHILS # BLD AUTO: 0.03 K/UL
BASOPHILS NFR BLD AUTO: 0.4 %
BILIRUB SERPL-MCNC: 0.2 MG/DL
BILIRUBIN URINE: NEGATIVE
BLOOD URINE: ABNORMAL
BUN SERPL-MCNC: 29 MG/DL
CALCIUM SERPL-MCNC: 10.2 MG/DL
CALCIUM SERPL-MCNC: 10.2 MG/DL
CHLORIDE SERPL-SCNC: 104 MMOL/L
CHOLEST SERPL-MCNC: 147 MG/DL
CO2 SERPL-SCNC: 21 MMOL/L
COLOR: NORMAL
CREAT SERPL-MCNC: 2.74 MG/DL
CREAT SPEC-SCNC: 147 MG/DL
CREAT/PROT UR: 0.6 RATIO
EOSINOPHIL # BLD AUTO: 0.08 K/UL
EOSINOPHIL NFR BLD AUTO: 1 %
ESTIMATED AVERAGE GLUCOSE: 146 MG/DL
GLUCOSE QUALITATIVE U: NEGATIVE
GLUCOSE SERPL-MCNC: 163 MG/DL
HBA1C MFR BLD HPLC: 6.7 %
HCT VFR BLD CALC: 31.9 %
HDLC SERPL-MCNC: 76 MG/DL
HGB BLD-MCNC: 9.8 G/DL
HYALINE CASTS: 0 /LPF
IMM GRANULOCYTES NFR BLD AUTO: 1.5 %
KETONES URINE: NEGATIVE
LDH SERPL-CCNC: 176 U/L
LDLC SERPL CALC-MCNC: 38 MG/DL
LEUKOCYTE ESTERASE URINE: ABNORMAL
LYMPHOCYTES # BLD AUTO: 1.92 K/UL
LYMPHOCYTES NFR BLD AUTO: 24.1 %
MAGNESIUM SERPL-MCNC: 1.9 MG/DL
MAN DIFF?: NORMAL
MCHC RBC-ENTMCNC: 30.7 GM/DL
MCHC RBC-ENTMCNC: 30.7 PG
MCV RBC AUTO: 100 FL
MICROSCOPIC-UA: NORMAL
MONOCYTES # BLD AUTO: 0.64 K/UL
MONOCYTES NFR BLD AUTO: 8 %
NEUTROPHILS # BLD AUTO: 5.17 K/UL
NEUTROPHILS NFR BLD AUTO: 65 %
NITRITE URINE: NEGATIVE
NONHDLC SERPL-MCNC: 70 MG/DL
PARATHYROID HORMONE INTACT: 62 PG/ML
PH URINE: 6
PHOSPHATE SERPL-MCNC: 3.9 MG/DL
PLATELET # BLD AUTO: 201 K/UL
POTASSIUM SERPL-SCNC: 4.5 MMOL/L
PROT SERPL-MCNC: 7 G/DL
PROT UR-MCNC: 89 MG/DL
PROTEIN URINE: ABNORMAL
RBC # BLD: 3.19 M/UL
RBC # FLD: 15.2 %
RED BLOOD CELLS URINE: 246 /HPF
SODIUM SERPL-SCNC: 141 MMOL/L
SPECIFIC GRAVITY URINE: 1.02
SQUAMOUS EPITHELIAL CELLS: 0 /HPF
TACROLIMUS SERPL-MCNC: 7.8 NG/ML
TRIGL SERPL-MCNC: 162 MG/DL
URATE SERPL-MCNC: 6.4 MG/DL
UROBILINOGEN URINE: NORMAL
WBC # FLD AUTO: 7.96 K/UL
WHITE BLOOD CELLS URINE: 43 /HPF

## 2021-12-13 ENCOUNTER — APPOINTMENT (OUTPATIENT)
Dept: COLORECTAL SURGERY | Facility: CLINIC | Age: 71
End: 2021-12-13
Payer: MEDICARE

## 2021-12-13 LAB
BKV DNA SPEC QL NAA+PROBE: NOT DETECTED COPIES/ML
CMV DNA SPEC QL NAA+PROBE: NOT DETECTED IU/ML

## 2021-12-13 PROCEDURE — 99024 POSTOP FOLLOW-UP VISIT: CPT

## 2021-12-13 NOTE — HISTORY OF PRESENT ILLNESS
[FreeTextEntry1] : 71 year-old male here for postop visit after undergoing chemical denervation of his internal sphincter and fulguration of anal lesions. The symptoms are improved. He is however suffering from\par mixed IBS type symptoms and occasional incontinence for which he is being worked up.

## 2022-01-06 ENCOUNTER — RESULT REVIEW (OUTPATIENT)
Age: 72
End: 2022-01-06

## 2022-01-06 ENCOUNTER — NON-APPOINTMENT (OUTPATIENT)
Age: 72
End: 2022-01-06

## 2022-01-07 ENCOUNTER — OUTPATIENT (OUTPATIENT)
Dept: OUTPATIENT SERVICES | Facility: HOSPITAL | Age: 72
LOS: 1 days | End: 2022-01-07
Payer: MEDICARE

## 2022-01-07 ENCOUNTER — NON-APPOINTMENT (OUTPATIENT)
Age: 72
End: 2022-01-07

## 2022-01-07 ENCOUNTER — APPOINTMENT (OUTPATIENT)
Dept: ULTRASOUND IMAGING | Facility: HOSPITAL | Age: 72
End: 2022-01-07
Payer: MEDICARE

## 2022-01-07 DIAGNOSIS — Z98.890 OTHER SPECIFIED POSTPROCEDURAL STATES: Chronic | ICD-10-CM

## 2022-01-07 DIAGNOSIS — Z00.00 ENCOUNTER FOR GENERAL ADULT MEDICAL EXAMINATION WITHOUT ABNORMAL FINDINGS: ICD-10-CM

## 2022-01-07 DIAGNOSIS — Z94.0 KIDNEY TRANSPLANT STATUS: ICD-10-CM

## 2022-01-07 DIAGNOSIS — Z90.5 ACQUIRED ABSENCE OF KIDNEY: Chronic | ICD-10-CM

## 2022-01-07 DIAGNOSIS — Z94.0 KIDNEY TRANSPLANT STATUS: Chronic | ICD-10-CM

## 2022-01-07 DIAGNOSIS — I77.0 ARTERIOVENOUS FISTULA, ACQUIRED: Chronic | ICD-10-CM

## 2022-01-07 LAB
ALBUMIN SERPL ELPH-MCNC: 4.2 G/DL
ALP BLD-CCNC: 104 U/L
ALT SERPL-CCNC: 15 U/L
APPEARANCE: CLEAR
AST SERPL-CCNC: 23 U/L
BACTERIA: NEGATIVE
BASOPHILS # BLD AUTO: 0.03 K/UL
BASOPHILS NFR BLD AUTO: 0.3 %
BILIRUB SERPL-MCNC: 0.2 MG/DL
BILIRUBIN URINE: NEGATIVE
BLOOD URINE: ABNORMAL
BUN SERPL-MCNC: 32 MG/DL
CALCIUM SERPL-MCNC: 10.3 MG/DL
CHLORIDE SERPL-SCNC: 109 MMOL/L
CO2 SERPL-SCNC: 21 MMOL/L
COLOR: NORMAL
COVID-19 SPIKE DOMAIN ANTIBODY INTERPRETATION: POSITIVE
CREAT SERPL-MCNC: 2.48 MG/DL
EOSINOPHIL # BLD AUTO: 0.15 K/UL
EOSINOPHIL NFR BLD AUTO: 1.7 %
GLUCOSE QUALITATIVE U: NEGATIVE
GLUCOSE SERPL-MCNC: 117 MG/DL
HCT VFR BLD CALC: 31.1 %
HGB BLD-MCNC: 9.8 G/DL
HYALINE CASTS: 0 /LPF
IMM GRANULOCYTES NFR BLD AUTO: 2 %
KETONES URINE: NEGATIVE
LEUKOCYTE ESTERASE URINE: ABNORMAL
LYMPHOCYTES # BLD AUTO: 2.61 K/UL
LYMPHOCYTES NFR BLD AUTO: 29.1 %
MAGNESIUM SERPL-MCNC: 1.7 MG/DL
MAN DIFF?: NORMAL
MCHC RBC-ENTMCNC: 31.3 PG
MCHC RBC-ENTMCNC: 31.5 GM/DL
MCV RBC AUTO: 99.4 FL
MICROSCOPIC-UA: NORMAL
MONOCYTES # BLD AUTO: 0.67 K/UL
MONOCYTES NFR BLD AUTO: 7.5 %
NEUTROPHILS # BLD AUTO: 5.34 K/UL
NEUTROPHILS NFR BLD AUTO: 59.4 %
NITRITE URINE: NEGATIVE
PH URINE: 6
PHOSPHATE SERPL-MCNC: 2.7 MG/DL
PLATELET # BLD AUTO: 239 K/UL
POTASSIUM SERPL-SCNC: 5.1 MMOL/L
PROT SERPL-MCNC: 7.3 G/DL
PROTEIN URINE: ABNORMAL
RBC # BLD: 3.13 M/UL
RBC # FLD: 15.7 %
RED BLOOD CELLS URINE: 23 /HPF
SARS-COV-2 AB SERPL IA-ACNC: >250 U/ML
SODIUM SERPL-SCNC: 142 MMOL/L
SPECIFIC GRAVITY URINE: 1.02
SQUAMOUS EPITHELIAL CELLS: 1 /HPF
TACROLIMUS SERPL-MCNC: 6.2 NG/ML
URATE SERPL-MCNC: 6.1 MG/DL
UROBILINOGEN URINE: NEGATIVE
WBC # FLD AUTO: 8.98 K/UL
WHITE BLOOD CELLS URINE: 15 /HPF

## 2022-01-07 PROCEDURE — 76776 US EXAM K TRANSPL W/DOPPLER: CPT

## 2022-01-07 PROCEDURE — 76776 US EXAM K TRANSPL W/DOPPLER: CPT | Mod: 26,RT

## 2022-01-10 ENCOUNTER — NON-APPOINTMENT (OUTPATIENT)
Age: 72
End: 2022-01-10

## 2022-01-10 LAB
BACTERIA UR CULT: NORMAL
BKV DNA SPEC QL NAA+PROBE: NOT DETECTED COPIES/ML
CMV DNA SPEC QL NAA+PROBE: NOT DETECTED IU/ML
PARATHYROID HORMONE INTACT: 37 PG/ML

## 2022-01-17 NOTE — ASU PATIENT PROFILE, ADULT - TRANSFUSION PREMEDICATION REQUIRED
Due for a visit and labs. Reminder letter sent.   Hydrochlorothiazide (HYDRODIURIL) 25 MG tablet   Last Written Prescription Date: 01/12/2021  Last Fill Quantity: 90,   # refills: 3  Last Office Visit: 01/13/2021  Future Office visit:       Routing refill request to provider for review/approval because:  Failed - No normal serum creatinine on file in past 12 months   Recent Labs   Lab Test 01/13/21  0940   CR 1.54*            Failed - No normal serum potassium on file in past 12 months   Recent Labs   Lab Test 01/13/21  0940   POTASSIUM 4.0                  Failed -No normal serum sodium on file in past 12 months   Recent Labs   Lab Test 01/13/21  0940   *           Unable to complete prescription refill per RNMedication Refill Policy.................... Barbi Mitchell RN ....................  1/17/2022   4:03 PM             none

## 2022-01-19 ENCOUNTER — APPOINTMENT (OUTPATIENT)
Dept: RADIATION ONCOLOGY | Facility: CLINIC | Age: 72
End: 2022-01-19
Payer: MEDICARE

## 2022-01-19 VITALS
HEART RATE: 90 BPM | DIASTOLIC BLOOD PRESSURE: 65 MMHG | OXYGEN SATURATION: 99 % | SYSTOLIC BLOOD PRESSURE: 122 MMHG | TEMPERATURE: 98.42 F | RESPIRATION RATE: 18 BRPM | BODY MASS INDEX: 29.41 KG/M2 | WEIGHT: 205 LBS

## 2022-01-19 PROCEDURE — 99212 OFFICE O/P EST SF 10 MIN: CPT

## 2022-01-19 NOTE — VITALS
[ECOG Performance Status: 1 - Restricted in physically strenuous activity but ambulatory and able to carry out work of a light or sedentary nature] : Performance Status: 1 - Restricted in physically strenuous activity but ambulatory and able to carry out work of a light or sedentary nature, e.g., light house work, office work [Maximal Pain Intensity: 1/10] : 1/10 [Least Pain Intensity: 1/10] : 1/10 [90: Able to carry normal activity; minor signs or symptoms of disease.] : 90: Able to carry normal activity; minor signs or symptoms of disease.

## 2022-01-19 NOTE — REVIEW OF SYSTEMS
[Diarrhea: Grade 1 - Increase of <4 stools per day over baseline; mild increase in ostomy output compared to baseline] : Diarrhea: Grade 1 - Increase of <4 stools per day over baseline; mild increase in ostomy output compared to baseline [Fatigue: Grade 1 - Fatigue relieved by rest] : Fatigue: Grade 1 - Fatigue relieved by rest [Hematuria: Grade 0] : Hematuria: Grade 0 [Urinary Tract Pain: Grade 0] : Urinary Tract Pain: Grade 0 [Erectile Dysfunction: Grade 2 - Decrease in erectile function (frequency/rigidity of erections), erectile intervention indicated, (e.g., medication or mechanical devices such as penile pump)] : Erectile Dysfunction: Grade 2 - Decrease in erectile function (frequency/rigidity of erections), erectile intervention indicated, (e.g., medication or mechanical devices such as penile pump) [Ejaculation Disorder: Grade 2 - Anejaculation or retrograde ejaculation] : Ejaculation Disorder: Grade 2 - Anejaculation or retrograde ejaculation [Constipation: Grade 1 - Occasional or intermittent symptoms; occasional use of stool softeners, laxatives, dietary modification, or enema] : Constipation: Grade 1 - Occasional or intermittent symptoms; occasional use of stool softeners, laxatives, dietary modification, or enema [Fecal Incontinence: Grade 1 - Occasional use of pads required] : Fecal Incontinence: Grade 1 - Occasional use of pads required [Rectal Pain: Grade 1 - Mild pain] : Rectal Pain: Grade 1 - Mild pain [Urinary Incontinence: Grade 1 - Occasional (e.g., with coughing, sneezing, etc.), pads not indicated] : Urinary Incontinence: Grade 1 - Occasional (e.g., with coughing, sneezing, etc.), pads not indicated [Urinary Retention: Grade 1 - Urinary, suprapubic or intermittent catheter placement not indicated; able to void with some residual] : Urinary Retention: Grade 1 - Urinary, suprapubic or intermittent catheter placement not indicated; able to void with some residual [Skin Hyperpigmentation: Grade 0] : Skin Hyperpigmentation: Grade 0 [Dermatitis Radiation: Grade 0] : Dermatitis Radiation: Grade 0 [FreeTextEntry8] : complains of leakage of stool  [FreeTextEntry3] : slow stream

## 2022-01-19 NOTE — HISTORY OF PRESENT ILLNESS
[FreeTextEntry1] : 70 y/o male with h/o kidney transplant (bilateral nephrectomy) in 2018, HTN, DM2 with prostate cancer \par \par Diagnosis: unfavorable intermediate risk prostate cancer, G 4+3=7, PSA 5.27ng/mL, MRI T2N0\par \par Radiation Treatment: Dr Calderon\par Treatment Site: Prostate/SV   7,000 cGy from 7/09/2021 -8/27/2021 with ADT\par Clinical Response: Tolerated the treatment well. \par Orgovyx delivered by Dr Phillip \par \par 1/19/22 -  patient for follow up visit.  Since last visit patient has undergone 11/3/21 chemical denervation fissurectomy with biopsy and fulguration of anal lesions.  Pathology: Fibroepithelial polyp with mild koilocytic atypia.   On 11/8/21 - TURP - pathology-  Prostate chips TUR  Benign Prostatic Hyperplasia  patient also had ureteral stent replacement done at the same time.  \par Patient continues to complain of stool leakage which causes some burning around his rectum.  Also complaining of slowly decreasing urine stream .. states after the "scrapping" it had improved but has been slowly getting weaker.\par IPSS 15 / EPIC 28\par \par PSA -  \par 5/25/18 -  3.41\par 10/26/20 - 4.29\par 12/7/20 - 5.27\par 10/20/21 - 0.01\par PSA drawn today\par \par

## 2022-01-19 NOTE — PROVIDER CONTACT NOTE (OTHER) - DATE AND TIME:
01-Mar-2019 01:33
28-Feb-2019 15:44
uvula midline, no vesicles, no redness, and no oropharyngeal exudate.
No

## 2022-01-28 LAB — PSA SERPL-MCNC: 0.03 NG/ML

## 2022-02-10 ENCOUNTER — NON-APPOINTMENT (OUTPATIENT)
Age: 72
End: 2022-02-10

## 2022-02-10 ENCOUNTER — APPOINTMENT (OUTPATIENT)
Dept: NEPHROLOGY | Facility: CLINIC | Age: 72
End: 2022-02-10
Payer: MEDICARE

## 2022-02-10 VITALS
TEMPERATURE: 208.76 F | HEART RATE: 98 BPM | HEIGHT: 70 IN | RESPIRATION RATE: 18 BRPM | BODY MASS INDEX: 29.78 KG/M2 | SYSTOLIC BLOOD PRESSURE: 170 MMHG | OXYGEN SATURATION: 99 % | DIASTOLIC BLOOD PRESSURE: 71 MMHG | WEIGHT: 208 LBS

## 2022-02-10 VITALS — SYSTOLIC BLOOD PRESSURE: 130 MMHG | DIASTOLIC BLOOD PRESSURE: 62 MMHG

## 2022-02-10 PROCEDURE — 99214 OFFICE O/P EST MOD 30 MIN: CPT

## 2022-02-10 RX ORDER — LINEZOLID 600 MG/1
600 TABLET, FILM COATED ORAL
Qty: 10 | Refills: 0 | Status: DISCONTINUED | COMMUNITY
Start: 2021-12-06 | End: 2022-02-10

## 2022-02-10 NOTE — REASON FOR VISIT
Patient last seen on 04/09/2019, next appt on 02/04/2020.Ladt A1C was done on 04/08/2019 and was 9.2.Ok for 90 day with 3 refills?  Thank You   [Initial] : an initial visit for [Kidney Transplant Evaluation] : kidney transplant evaluation [FreeTextEntry2] : Dr. Bonilla

## 2022-02-10 NOTE — HISTORY OF PRESENT ILLNESS
[80: Normal activity with effort; some signs or symptoms of disease.] : 80: Normal activity with effort; some signs or symptoms of disease.  [FreeTextEntry1] : Tube exchanged October 29th.  Pt diagnosed with Covid, wife was diagnosed with Covid on Jan 28th 2021.  On 29th pt was tested in an urgent care, tested positive and went to [place].  Had no symptoms.  Received the monoclonal antibody on the 29th.  Creatinine in 3's in [facility] and he was admitted and received 5 days of IV antibiotics for urine infection.\par \par Pt admitted June 2nd 2021 for CHELA and hyperkalemia.  Found to have UTI and treated.  Had pseudomonas in urine and received zosyn.  CHELA improved before discharge.  Creatinine decreased to 2.7 before discharge.  Stent internalized\par \par Receiving RT therapy for prostate. Has had admissions with urinary retention after RT started.  \par Saw an oncologist who recently performed labs and mentioned that creatinine was high.  Pt still has armenta catheter and was advised to have a TURP.  Pt will be seeing Dr. Collins this Thursday.  Had a positive urine culture end of September.  \par \par friend Dr. Sean Salinas [phone]. [de-identified] : Pt was admitted to [facility] 11/5 for IV abx prior to planned TURP 11/8.  11/9/21 - s/p TURP and transplant kidney stent replacement.  Velez removed. Pt voided very light pink about 250 cc. \par \par Pt has followed up with Dr. Phillip, urinating well, had 80 cc PVR.  \par \par Pts fissure is improved, urinating well.  Started on a stool bulking agent.  Last creatinine stable at 2.4.

## 2022-02-10 NOTE — ASSESSMENT
[FreeTextEntry1] : 1. CKD of kidney transplantation - Pts aaron creatinine 1.9 but had CHELA with pyelonephritis. Has had multiple episodes of CHELA.  Pt in process of relisting but on hold due to prostate cancer.  Cell free DNA 0.2% in October end.  Last creatinine 2.4 in hospital.  Pt also has internal JJ stent which was exchanged this 11/8/21. Next exchange in May. \par 2. Immunosuppression - simulect induction, tacrolimus target 5-7, MMF 250mgs BID.  \par 3. DM2 - on januvia and glimepiride. BG controlled at home.  A1c at goal. \par 4. HTN - controlled at home.\par 5. HCV - genotype 3a. completed Epclusa. Last vl note detected. \par 6. Anemia - due to CKD/CHELA.  last Hb at goal. \par 7. Chronic diarrhea - on and off on lomotil prn. Has seen GI and had a colonoscopy in 2019. Has history of small bowel resection which may be the cause of diarrhea. 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 \par 9.  UTI - last culture positive with MDR organism. \par 10.  COVID19 -  Resolved.  Received monoclonal antibody and recently vaccinated.  \par 11.  Prostate cancer - Completed androgen depletion and completed RT.  Last PSA was low. \par 12.  Urine retention - now s/p TURP.  PVR greatly improved and he is urinating better.  Creatinine has also improved.  \par \par f/u in 3 months.

## 2022-02-15 LAB
25(OH)D3 SERPL-MCNC: 31 NG/ML
ALBUMIN SERPL ELPH-MCNC: 4 G/DL
ALP BLD-CCNC: 81 U/L
ALT SERPL-CCNC: 13 U/L
ANION GAP SERPL CALC-SCNC: 12 MMOL/L
APPEARANCE: CLEAR
AST SERPL-CCNC: 19 U/L
BACTERIA: ABNORMAL
BASOPHILS # BLD AUTO: 0.03 K/UL
BASOPHILS NFR BLD AUTO: 0.4 %
BILIRUB SERPL-MCNC: 0.2 MG/DL
BILIRUBIN URINE: NEGATIVE
BKV DNA SPEC QL NAA+PROBE: NOT DETECTED COPIES/ML
BLOOD URINE: ABNORMAL
BUN SERPL-MCNC: 32 MG/DL
CALCIUM SERPL-MCNC: 10.1 MG/DL
CALCIUM SERPL-MCNC: 10.1 MG/DL
CHLORIDE SERPL-SCNC: 108 MMOL/L
CHOLEST SERPL-MCNC: 129 MG/DL
CMV DNA SPEC QL NAA+PROBE: NOT DETECTED IU/ML
CO2 SERPL-SCNC: 22 MMOL/L
COLOR: NORMAL
COVID-19 SPIKE DOMAIN ANTIBODY INTERPRETATION: POSITIVE
CREAT SERPL-MCNC: 2.75 MG/DL
CREAT SPEC-SCNC: 98 MG/DL
CREAT/PROT UR: 0.2 RATIO
EOSINOPHIL # BLD AUTO: 0.1 K/UL
EOSINOPHIL NFR BLD AUTO: 1.4 %
ESTIMATED AVERAGE GLUCOSE: 126 MG/DL
GLUCOSE QUALITATIVE U: NEGATIVE
GLUCOSE SERPL-MCNC: 152 MG/DL
HBA1C MFR BLD HPLC: 6 %
HCT VFR BLD CALC: 33.5 %
HDLC SERPL-MCNC: 75 MG/DL
HGB BLD-MCNC: 9.9 G/DL
HYALINE CASTS: 1 /LPF
IMM GRANULOCYTES NFR BLD AUTO: 0.9 %
KETONES URINE: NEGATIVE
LDLC SERPL CALC-MCNC: 36 MG/DL
LEUKOCYTE ESTERASE URINE: ABNORMAL
LYMPHOCYTES # BLD AUTO: 2.34 K/UL
LYMPHOCYTES NFR BLD AUTO: 33.5 %
MAGNESIUM SERPL-MCNC: 1.4 MG/DL
MAN DIFF?: NORMAL
MCHC RBC-ENTMCNC: 29.1 PG
MCHC RBC-ENTMCNC: 29.6 GM/DL
MCV RBC AUTO: 98.5 FL
MICROSCOPIC-UA: NORMAL
MONOCYTES # BLD AUTO: 0.56 K/UL
MONOCYTES NFR BLD AUTO: 8 %
NEUTROPHILS # BLD AUTO: 3.9 K/UL
NEUTROPHILS NFR BLD AUTO: 55.8 %
NITRITE URINE: NEGATIVE
NONHDLC SERPL-MCNC: 54 MG/DL
PARATHYROID HORMONE INTACT: 57 PG/ML
PH URINE: 5.5
PHOSPHATE SERPL-MCNC: 3.3 MG/DL
PLATELET # BLD AUTO: 194 K/UL
POTASSIUM SERPL-SCNC: 5.3 MMOL/L
PROT SERPL-MCNC: 6.7 G/DL
PROT UR-MCNC: 24 MG/DL
PROTEIN URINE: NORMAL
RBC # BLD: 3.4 M/UL
RBC # FLD: 15.1 %
RED BLOOD CELLS URINE: 15 /HPF
SARS-COV-2 AB SERPL IA-ACNC: >250 U/ML
SODIUM SERPL-SCNC: 142 MMOL/L
SPECIFIC GRAVITY URINE: 1.01
SQUAMOUS EPITHELIAL CELLS: 0 /HPF
TACROLIMUS SERPL-MCNC: 5.8 NG/ML
TRIGL SERPL-MCNC: 88 MG/DL
URATE SERPL-MCNC: 6.9 MG/DL
UROBILINOGEN URINE: NORMAL
WBC # FLD AUTO: 6.99 K/UL
WHITE BLOOD CELLS URINE: 39 /HPF

## 2022-02-16 NOTE — PROGRESS NOTE ADULT - SUBJECTIVE AND OBJECTIVE BOX
"Initial BP (!) 180/84 (Patient Position: Sitting, Cuff Size: Adult Regular)   Pulse 59   Temp 98.1  F (36.7  C) (Tympanic)   Ht 1.702 m (5' 7\")   Wt 90.7 kg (200 lb)   BMI 31.32 kg/m   Estimated body mass index is 31.32 kg/m  as calculated from the following:    Height as of this encounter: 1.702 m (5' 7\").    Weight as of this encounter: 90.7 kg (200 lb). .    Gemma Roman CMA    " [facility] DIVISION OF KIDNEY DISEASES AND HYPERTENSION -- FOLLOW UP NOTE  --------------------------------------------------------------------------------  Chief Complaint:  renal transplant, CHELA    24 hour events/subjective:  Spiking temps overnight. Still with cough, low appetite and diarrhea.       PAST HISTORY  --------------------------------------------------------------------------------  No significant changes to PMH, PSH, FHx, SHx, unless otherwise noted    ALLERGIES & MEDICATIONS  --------------------------------------------------------------------------------  Allergies    No Known Allergies    Intolerances      Standing Inpatient Medications  atovaquone Suspension 750 milliGRAM(s) Oral two times a day  dextrose 5%. 1000 milliLiter(s) IV Continuous <Continuous>  dextrose 50% Injectable 12.5 Gram(s) IV Push once  dextrose 50% Injectable 25 Gram(s) IV Push once  dextrose 50% Injectable 25 Gram(s) IV Push once  docusate sodium 100 milliGRAM(s) Oral three times a day  famotidine    Tablet 20 milliGRAM(s) Oral daily  gabapentin 300 milliGRAM(s) Oral daily  heparin  Injectable 5000 Unit(s) SubCutaneous every 12 hours  insulin lispro (HumaLOG) corrective regimen sliding scale   SubCutaneous three times a day before meals  insulin lispro (HumaLOG) corrective regimen sliding scale   SubCutaneous at bedtime  meropenem  IVPB      predniSONE   Tablet 5 milliGRAM(s) Oral daily  sodium chloride 0.9%. 1000 milliLiter(s) IV Continuous <Continuous>  tamsulosin 0.4 milliGRAM(s) Oral at bedtime    PRN Inpatient Medications  acetaminophen   Tablet .. 650 milliGRAM(s) Oral every 6 hours PRN  dextrose 40% Gel 15 Gram(s) Oral once PRN  glucagon  Injectable 1 milliGRAM(s) IntraMuscular once PRN  senna 2 Tablet(s) Oral at bedtime PRN      REVIEW OF SYSTEMS  --------------------------------------------------------------------------------  Gen: +fatigue, +fevers/chills, +weakness  Respiratory: No dyspnea, +cough  CV: No chest pain  GI: No abdominal pain, +diarrhea  MSK: No joint pain/swelling; no edema    VITALS/PHYSICAL EXAM  --------------------------------------------------------------------------------  T(C): 37.8 (02-28-19 @ 05:59), Max: 39.1 (02-27-19 @ 22:01)  HR: 105 (02-28-19 @ 04:40) (86 - 107)  BP: 146/67 (02-28-19 @ 04:40) (93/50 - 146/67)  RR: 18 (02-28-19 @ 04:40) (17 - 18)  SpO2: 98% (02-28-19 @ 04:40) (96% - 98%)  Wt(kg): --  Height (cm): 177.8 (02-27-19 @ 12:49)  Weight (kg): 102.1 (02-27-19 @ 12:49)  BMI (kg/m2): 32.3 (02-27-19 @ 12:49)  BSA (m2): 2.19 (02-27-19 @ 12:49)      02-27-19 @ 07:01  -  02-28-19 @ 07:00  --------------------------------------------------------  IN: 1680 mL / OUT: 0 mL / NET: 1680 mL    02-28-19 @ 07:01  -  02-28-19 @ 10:52  --------------------------------------------------------  IN: 700 mL / OUT: 100 mL / NET: 600 mL      Physical Exam:  	Gen: NAD  	Pulm: CTA B/L  	CV: RRR, S1S2; no rub  	Abd: +BS, soft, nontender/nondistended; RLQ transplant no ttp  	UE: Warm, no edema   	LE: Warm, no edema  	Neuro: follows commands  	Psych: Normal affect and mood  	Skin: Warm, without rashes              Access: +[facility]    LABS/STUDIES  --------------------------------------------------------------------------------              9.7    12.1  >-----------<  129      [02-28-19 @ 06:57]              28.0     131  |  101  |  65  ----------------------------<  101      [02-28-19 @ 06:57]  3.8   |  14  |  6.28        Ca     8.9     [02-28-19 @ 06:57]      Mg     1.5     [02-28-19 @ 06:57]      Phos  3.1     [02-28-19 @ 06:57]    TPro  6.0  /  Alb  3.1  /  TBili  1.1  /  DBili  x   /  AST  38  /  ALT  44  /  AlkPhos  86  [02-28-19 @ 06:57]    PT/INR: PT 11.4 , INR 1.00       [02-27-19 @ 15:14]  PTT: 39.7       [02-27-19 @ 15:14]      Creatinine Trend:  SCr 6.28 [02-28 @ 06:57]  SCr 5.88 [02-27 @ 15:14]    Urinalysis - [02-27-19 @ 18:18]      Color Orange / Appearance Turbid / SG 1.017 / pH 6.0      Gluc Negative / Ketone Negative  / Bili Negative / Urobili Negative       Blood Moderate / Protein 300 mg/dL / Leuk Est Large / Nitrite Negative      RBC  / WBC  / Hyaline  / Gran  / Sq Epi  / Non Sq Epi  / Bacteria       HbA1c 6.0      [07-17-18 @ 16:53]

## 2022-03-28 ENCOUNTER — APPOINTMENT (OUTPATIENT)
Dept: COLORECTAL SURGERY | Facility: CLINIC | Age: 72
End: 2022-03-28
Payer: MEDICARE

## 2022-03-28 PROCEDURE — 99212 OFFICE O/P EST SF 10 MIN: CPT | Mod: 25

## 2022-03-28 PROCEDURE — 46600 DIAGNOSTIC ANOSCOPY SPX: CPT

## 2022-03-28 NOTE — HISTORY OF PRESENT ILLNESS
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 He had undergone a chemical denervation of his internal sphincter and fulguration performed by me in December His symptoms had markedly improved, Are now slowly recurring.

## 2022-03-31 ENCOUNTER — OUTPATIENT (OUTPATIENT)
Dept: OUTPATIENT SERVICES | Facility: HOSPITAL | Age: 72
LOS: 1 days | End: 2022-03-31
Payer: MEDICARE

## 2022-03-31 VITALS
DIASTOLIC BLOOD PRESSURE: 71 MMHG | HEIGHT: 70 IN | RESPIRATION RATE: 16 BRPM | TEMPERATURE: 98 F | WEIGHT: 207.9 LBS | SYSTOLIC BLOOD PRESSURE: 129 MMHG | OXYGEN SATURATION: 100 % | HEART RATE: 77 BPM

## 2022-03-31 DIAGNOSIS — Z90.5 ACQUIRED ABSENCE OF KIDNEY: Chronic | ICD-10-CM

## 2022-03-31 DIAGNOSIS — E11.9 TYPE 2 DIABETES MELLITUS WITHOUT COMPLICATIONS: ICD-10-CM

## 2022-03-31 DIAGNOSIS — A63.0 ANOGENITAL (VENEREAL) WARTS: ICD-10-CM

## 2022-03-31 DIAGNOSIS — Z98.890 OTHER SPECIFIED POSTPROCEDURAL STATES: Chronic | ICD-10-CM

## 2022-03-31 DIAGNOSIS — I77.0 ARTERIOVENOUS FISTULA, ACQUIRED: Chronic | ICD-10-CM

## 2022-03-31 DIAGNOSIS — Z01.818 ENCOUNTER FOR OTHER PREPROCEDURAL EXAMINATION: ICD-10-CM

## 2022-03-31 DIAGNOSIS — Z90.79 ACQUIRED ABSENCE OF OTHER GENITAL ORGAN(S): Chronic | ICD-10-CM

## 2022-03-31 DIAGNOSIS — Z94.0 KIDNEY TRANSPLANT STATUS: Chronic | ICD-10-CM

## 2022-03-31 DIAGNOSIS — Z94.0 KIDNEY TRANSPLANT STATUS: ICD-10-CM

## 2022-03-31 DIAGNOSIS — G47.33 OBSTRUCTIVE SLEEP APNEA (ADULT) (PEDIATRIC): ICD-10-CM

## 2022-03-31 LAB
ANION GAP SERPL CALC-SCNC: 11 MMOL/L — SIGNIFICANT CHANGE UP (ref 5–17)
BUN SERPL-MCNC: 31 MG/DL — HIGH (ref 7–23)
CALCIUM SERPL-MCNC: 10.1 MG/DL — SIGNIFICANT CHANGE UP (ref 8.4–10.5)
CHLORIDE SERPL-SCNC: 104 MMOL/L — SIGNIFICANT CHANGE UP (ref 96–108)
CO2 SERPL-SCNC: 23 MMOL/L — SIGNIFICANT CHANGE UP (ref 22–31)
CREAT SERPL-MCNC: 2.43 MG/DL — HIGH (ref 0.5–1.3)
EGFR: 28 ML/MIN/1.73M2 — LOW
GLUCOSE SERPL-MCNC: 149 MG/DL — HIGH (ref 70–99)
HCT VFR BLD CALC: 31.4 % — LOW (ref 39–50)
HGB BLD-MCNC: 9.5 G/DL — LOW (ref 13–17)
MCHC RBC-ENTMCNC: 27.4 PG — SIGNIFICANT CHANGE UP (ref 27–34)
MCHC RBC-ENTMCNC: 30.3 GM/DL — LOW (ref 32–36)
MCV RBC AUTO: 90.5 FL — SIGNIFICANT CHANGE UP (ref 80–100)
NRBC # BLD: 0 /100 WBCS — SIGNIFICANT CHANGE UP (ref 0–0)
PLATELET # BLD AUTO: 201 K/UL — SIGNIFICANT CHANGE UP (ref 150–400)
POTASSIUM SERPL-MCNC: 5.2 MMOL/L — SIGNIFICANT CHANGE UP (ref 3.5–5.3)
POTASSIUM SERPL-SCNC: 5.2 MMOL/L — SIGNIFICANT CHANGE UP (ref 3.5–5.3)
RBC # BLD: 3.47 M/UL — LOW (ref 4.2–5.8)
RBC # FLD: 15.6 % — HIGH (ref 10.3–14.5)
SODIUM SERPL-SCNC: 138 MMOL/L — SIGNIFICANT CHANGE UP (ref 135–145)
WBC # BLD: 9.43 K/UL — SIGNIFICANT CHANGE UP (ref 3.8–10.5)
WBC # FLD AUTO: 9.43 K/UL — SIGNIFICANT CHANGE UP (ref 3.8–10.5)

## 2022-03-31 PROCEDURE — 85027 COMPLETE CBC AUTOMATED: CPT

## 2022-03-31 PROCEDURE — 83036 HEMOGLOBIN GLYCOSYLATED A1C: CPT

## 2022-03-31 PROCEDURE — G0463: CPT

## 2022-03-31 PROCEDURE — 36415 COLL VENOUS BLD VENIPUNCTURE: CPT

## 2022-03-31 PROCEDURE — 80048 BASIC METABOLIC PNL TOTAL CA: CPT

## 2022-03-31 RX ORDER — LIDOCAINE HCL 20 MG/ML
0.2 VIAL (ML) INJECTION ONCE
Refills: 0 | Status: DISCONTINUED | OUTPATIENT
Start: 2022-04-05 | End: 2022-04-19

## 2022-03-31 RX ORDER — SODIUM CHLORIDE 9 MG/ML
3 INJECTION INTRAMUSCULAR; INTRAVENOUS; SUBCUTANEOUS EVERY 8 HOURS
Refills: 0 | Status: DISCONTINUED | OUTPATIENT
Start: 2022-04-05 | End: 2022-04-19

## 2022-03-31 NOTE — H&P PST ADULT - HISTORY OF PRESENT ILLNESS
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 He is scheduled for Excision, Fulguration of Anal Condyloma on 04/05/22.     Covid test 04/02/22. Denies current symptoms or recent exposure.  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 He is scheduled for Excision, Fulguration of Anal Condyloma on 04/05/22.     Covid test 04/02/22. Denies current symptoms or recent exposure.

## 2022-03-31 NOTE — H&P PST ADULT - GENERAL GENITOURINARY SYMPTOMS
S/p kidney transplant, creatinine level fluctuates between 2 and 3 as per patient. No dialysis since 2018.

## 2022-03-31 NOTE — H&P PST ADULT - PRIMARY CARE PROVIDER
Nephrologist--Dr. Arian [phone] Nephrologist--Dr. Arian Bhakta [phone] - last note 02/10/22 on chart

## 2022-03-31 NOTE — H&P PST ADULT - NSICDXPASTMEDICALHX_GEN_ALL_CORE_FT
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 C : post treatment: patient  tested Negative for Hep C    HTN (hypertension)     Medial meniscus tear ' 90's  Right    Prostate cancer s/p RT    Renal cell carcinoma s/p b/l nephrectomy and renal transplant in 2018    Type 2 diabetes mellitus dx: '88    Ureteral stricture of kidney transplant 2018

## 2022-03-31 NOTE — H&P PST ADULT - MUSCULOSKELETAL
no joint swelling/no joint erythema/normal strength details… detailed exam no joint swelling/normal strength

## 2022-03-31 NOTE — H&P PST ADULT - NSICDXPASTSURGICALHX_GEN_ALL_CORE_FT
PAST SURGICAL HISTORY:  AV fistula 2013/ left forearm    H/O colonoscopy     H/O ileostomy '    for 3 months. s/p Reversal    Kidney transplant recipient 2018  @ [facility] :  +  Hep C Donor    S/p nephrectomy left 9/15/2015   + Cancer    S/p nephrectomy right 12/10/2015   benign    S/P right knee arthroscopy '     S/P TURP (transurethral resection of prostate) and Transplant Kidney Stent Replacemernt, 21     PAST SURGICAL HISTORY:  AV fistula 2013/ left forearm    H/O colonoscopy     H/O ileostomy '    for 3 months. s/p Reversal    Kidney transplant recipient 2018  @ [facility] :  +  Hep C Donor    S/P anal fissurectomy and chemical denervation  with biopsy and fulguration of anal lesions, 2021    S/p nephrectomy left 9/15/2015   + Cancer    S/p nephrectomy right 12/10/2015   benign    S/P right knee arthroscopy     S/P TURP (transurethral resection of prostate) and Transplant Kidney Stent Replacemernt, 21

## 2022-03-31 NOTE — H&P PST ADULT - NEGATIVE GENERAL GENITOURINARY SYMPTOMS
no hematuria/no renal colic/no flank pain L/no flank pain R/no urine discoloration/no bladder infections/no incontinence/no dysuria/normal urinary frequency no hematuria/no renal colic/no flank pain L/no flank pain R/no urine discoloration/no incontinence/no dysuria/normal urinary frequency

## 2022-04-01 LAB
A1C WITH ESTIMATED AVERAGE GLUCOSE RESULT: 6.5 % — HIGH (ref 4–5.6)
ESTIMATED AVERAGE GLUCOSE: 140 MG/DL — HIGH (ref 68–114)

## 2022-04-02 ENCOUNTER — OUTPATIENT (OUTPATIENT)
Dept: OUTPATIENT SERVICES | Facility: HOSPITAL | Age: 72
LOS: 1 days | End: 2022-04-02
Payer: MEDICARE

## 2022-04-02 DIAGNOSIS — Z90.5 ACQUIRED ABSENCE OF KIDNEY: Chronic | ICD-10-CM

## 2022-04-02 DIAGNOSIS — Z90.79 ACQUIRED ABSENCE OF OTHER GENITAL ORGAN(S): Chronic | ICD-10-CM

## 2022-04-02 DIAGNOSIS — Z98.890 OTHER SPECIFIED POSTPROCEDURAL STATES: Chronic | ICD-10-CM

## 2022-04-02 DIAGNOSIS — Z11.52 ENCOUNTER FOR SCREENING FOR COVID-19: ICD-10-CM

## 2022-04-02 DIAGNOSIS — Z94.0 KIDNEY TRANSPLANT STATUS: Chronic | ICD-10-CM

## 2022-04-02 DIAGNOSIS — I77.0 ARTERIOVENOUS FISTULA, ACQUIRED: Chronic | ICD-10-CM

## 2022-04-02 LAB — SARS-COV-2 RNA SPEC QL NAA+PROBE: SIGNIFICANT CHANGE UP

## 2022-04-02 PROCEDURE — U0003: CPT

## 2022-04-02 PROCEDURE — C9803: CPT

## 2022-04-02 PROCEDURE — U0005: CPT

## 2022-04-04 ENCOUNTER — APPOINTMENT (OUTPATIENT)
Dept: UROLOGY | Facility: CLINIC | Age: 72
End: 2022-04-04
Payer: MEDICARE

## 2022-04-04 ENCOUNTER — TRANSCRIPTION ENCOUNTER (OUTPATIENT)
Age: 72
End: 2022-04-04

## 2022-04-04 VITALS — SYSTOLIC BLOOD PRESSURE: 125 MMHG | HEART RATE: 76 BPM | DIASTOLIC BLOOD PRESSURE: 52 MMHG

## 2022-04-04 DIAGNOSIS — N40.0 BENIGN PROSTATIC HYPERPLASIA WITHOUT LOWER URINARY TRACT SYMPMS: ICD-10-CM

## 2022-04-04 PROBLEM — N18.9 CHRONIC KIDNEY DISEASE, UNSPECIFIED: Chronic | Status: ACTIVE | Noted: 2022-03-31

## 2022-04-04 PROBLEM — H26.9 UNSPECIFIED CATARACT: Chronic | Status: ACTIVE | Noted: 2022-03-31

## 2022-04-04 PROBLEM — U07.1 COVID-19: Chronic | Status: ACTIVE | Noted: 2022-03-31

## 2022-04-04 PROBLEM — C61 MALIGNANT NEOPLASM OF PROSTATE: Chronic | Status: ACTIVE | Noted: 2021-08-01

## 2022-04-04 PROBLEM — N18.6 END STAGE RENAL DISEASE: Chronic | Status: ACTIVE | Noted: 2019-02-12

## 2022-04-04 PROCEDURE — 99214 OFFICE O/P EST MOD 30 MIN: CPT

## 2022-04-05 ENCOUNTER — TRANSCRIPTION ENCOUNTER (OUTPATIENT)
Age: 72
End: 2022-04-05

## 2022-04-05 ENCOUNTER — OUTPATIENT (OUTPATIENT)
Dept: OUTPATIENT SERVICES | Facility: HOSPITAL | Age: 72
LOS: 1 days | End: 2022-04-05
Payer: MEDICARE

## 2022-04-05 ENCOUNTER — APPOINTMENT (OUTPATIENT)
Dept: COLORECTAL SURGERY | Facility: HOSPITAL | Age: 72
End: 2022-04-05
Payer: MEDICARE

## 2022-04-05 VITALS
OXYGEN SATURATION: 98 % | DIASTOLIC BLOOD PRESSURE: 63 MMHG | WEIGHT: 207.9 LBS | TEMPERATURE: 98 F | SYSTOLIC BLOOD PRESSURE: 119 MMHG | RESPIRATION RATE: 16 BRPM | HEIGHT: 70 IN | HEART RATE: 85 BPM

## 2022-04-05 VITALS
SYSTOLIC BLOOD PRESSURE: 112 MMHG | DIASTOLIC BLOOD PRESSURE: 62 MMHG | RESPIRATION RATE: 16 BRPM | OXYGEN SATURATION: 100 % | HEART RATE: 80 BPM

## 2022-04-05 DIAGNOSIS — Z90.79 ACQUIRED ABSENCE OF OTHER GENITAL ORGAN(S): Chronic | ICD-10-CM

## 2022-04-05 DIAGNOSIS — A63.0 ANOGENITAL (VENEREAL) WARTS: ICD-10-CM

## 2022-04-05 DIAGNOSIS — Z98.890 OTHER SPECIFIED POSTPROCEDURAL STATES: Chronic | ICD-10-CM

## 2022-04-05 DIAGNOSIS — Z90.5 ACQUIRED ABSENCE OF KIDNEY: Chronic | ICD-10-CM

## 2022-04-05 DIAGNOSIS — I77.0 ARTERIOVENOUS FISTULA, ACQUIRED: Chronic | ICD-10-CM

## 2022-04-05 DIAGNOSIS — Z94.0 KIDNEY TRANSPLANT STATUS: Chronic | ICD-10-CM

## 2022-04-05 PROBLEM — N40.0 BPH WITHOUT OBSTRUCTION/LOWER URINARY TRACT SYMPTOMS: Noted: 2020-06-10

## 2022-04-05 LAB — GLUCOSE BLDC GLUCOMTR-MCNC: 123 MG/DL — HIGH (ref 70–99)

## 2022-04-05 PROCEDURE — 46910 DESTRUCTION ANAL LESION(S): CPT

## 2022-04-05 PROCEDURE — C1889: CPT

## 2022-04-05 PROCEDURE — 82962 GLUCOSE BLOOD TEST: CPT

## 2022-04-05 DEVICE — SURGICEL 2 X 14": Type: IMPLANTABLE DEVICE | Status: FUNCTIONAL

## 2022-04-05 RX ORDER — SODIUM CHLORIDE 9 MG/ML
1000 INJECTION, SOLUTION INTRAVENOUS
Refills: 0 | Status: DISCONTINUED | OUTPATIENT
Start: 2022-04-05 | End: 2022-04-19

## 2022-04-05 RX ORDER — ONDANSETRON 8 MG/1
4 TABLET, FILM COATED ORAL ONCE
Refills: 0 | Status: DISCONTINUED | OUTPATIENT
Start: 2022-04-05 | End: 2022-04-19

## 2022-04-05 RX ORDER — FENTANYL CITRATE 50 UG/ML
25 INJECTION INTRAVENOUS
Refills: 0 | Status: DISCONTINUED | OUTPATIENT
Start: 2022-04-05 | End: 2022-04-05

## 2022-04-05 NOTE — ASU DISCHARGE PLAN (ADULT/PEDIATRIC) - NS MD DC FALL RISK RISK
For information on Fall & Injury Prevention, visit: [email or web address] OR  [email or web address] OR  https://www.cdc.gov/steadi/patient.html

## 2022-04-05 NOTE — ASU PREOP CHECKLIST - LAST DOSE WITHIN LAST 24HRS
Patient has completed her neoadjuvant treatment and now is looking into definitive surgery. Options of bilateral mastectomy with immediate versus delayed reconstruction have been discussed with patient in the past.  She has seen several plastic surgeons as well as her oncologist and after some research she decided that she is going to have a bilateral mastectomy 1st followed by a hysterectomy at another time followed by reconstruction last.  She has yet to decide whether she is going to have us do the surgery versus a breast surgeon at another institution.  We talked at length about the surgery as far as exact we will repeat going to do and to cover nerve recovery patient can expect.  Once she lets me know that she has rare to proceed with us operating her will schedule the procedure and patient will come in for preop.  She is looking having surgery 2nd week of October.   Yes

## 2022-04-05 NOTE — ASU PATIENT PROFILE, ADULT - NSICDXPASTSURGICALHX_GEN_ALL_CORE_FT
PAST SURGICAL HISTORY:  AV fistula 2013/ left forearm    H/O colonoscopy     H/O ileostomy '    for 3 months. s/p Reversal    Kidney transplant recipient 2018  @ [facility] :  +  Hep C Donor    S/P anal fissurectomy and chemical denervation  with biopsy and fulguration of anal lesions, 2021    S/p nephrectomy left 9/15/2015   + Cancer    S/p nephrectomy right 12/10/2015   benign    S/P right knee arthroscopy     S/P TURP (transurethral resection of prostate) and Transplant Kidney Stent Replacemernt, 21

## 2022-04-05 NOTE — ASU DISCHARGE PLAN (ADULT/PEDIATRIC) - CARE PROVIDER_API CALL
Tony Kumar)  ColonRectal Surgery; Surgery  [address], [address]  [place] [address]  Phone: [phone]  Fax: [phone]  Established Patient  Follow Up Time: 1 month

## 2022-04-05 NOTE — ASU PATIENT PROFILE, ADULT - FALL HARM RISK - UNIVERSAL INTERVENTIONS
Bed in lowest position, wheels locked, appropriate side rails in place/Call bell, personal items and telephone in reach/Instruct patient to call for assistance before getting out of bed or chair/Non-slip footwear when patient is out of bed/[place] to call system/Physically safe environment - no spills, clutter or unnecessary equipment/Purposeful Proactive Rounding/Room/bathroom lighting operational, light cord in reach

## 2022-04-18 ENCOUNTER — APPOINTMENT (OUTPATIENT)
Dept: COLORECTAL SURGERY | Facility: CLINIC | Age: 72
End: 2022-04-18
Payer: MEDICARE

## 2022-04-18 PROCEDURE — 99024 POSTOP FOLLOW-UP VISIT: CPT

## 2022-04-18 NOTE — HISTORY OF PRESENT ILLNESS
[FreeTextEntry1] : 71-year-old male status post fulguration of anal condyloma here for postop visit.

## 2022-04-20 ENCOUNTER — APPOINTMENT (OUTPATIENT)
Dept: RADIATION ONCOLOGY | Facility: CLINIC | Age: 72
End: 2022-04-20
Payer: MEDICARE

## 2022-04-20 PROBLEM — U07.1 COVID-19 VIRUS INFECTION: Status: RESOLVED | Noted: 2022-04-20 | Resolved: 2022-04-20

## 2022-04-27 ENCOUNTER — APPOINTMENT (OUTPATIENT)
Dept: RADIATION ONCOLOGY | Facility: CLINIC | Age: 72
End: 2022-04-27
Payer: MEDICARE

## 2022-04-27 VITALS
DIASTOLIC BLOOD PRESSURE: 74 MMHG | RESPIRATION RATE: 17 BRPM | BODY MASS INDEX: 29.85 KG/M2 | HEART RATE: 79 BPM | SYSTOLIC BLOOD PRESSURE: 129 MMHG | OXYGEN SATURATION: 99 % | TEMPERATURE: 97.88 F | WEIGHT: 208 LBS

## 2022-04-27 DIAGNOSIS — Z92.3 PERSONAL HISTORY OF IRRADIATION: ICD-10-CM

## 2022-04-27 DIAGNOSIS — U07.1 COVID-19: ICD-10-CM

## 2022-04-27 PROCEDURE — 99212 OFFICE O/P EST SF 10 MIN: CPT | Mod: CS

## 2022-04-27 RX ORDER — RELUGOLIX 120 MG/1
120 TABLET, FILM COATED ORAL
Qty: 90 | Refills: 1 | Status: DISCONTINUED | COMMUNITY
Start: 2021-03-23 | End: 2022-04-27

## 2022-04-27 RX ORDER — CODEINE SULFATE 15 MG/1
15 TABLET ORAL EVERY 4 HOURS
Qty: 42 | Refills: 0 | Status: DISCONTINUED | COMMUNITY
Start: 2021-10-20 | End: 2022-04-27

## 2022-04-27 NOTE — REVIEW OF SYSTEMS
[IPSS Score (0-40): ___] : IPSS score: [unfilled] [EPIC-CP Score (0-60): ___] : EPIC-CP score: [unfilled] [Negative] : Endocrine [FreeTextEntry7] : rectal pain s/p excision and fulguration of anal condyloma on 4/5/22

## 2022-04-27 NOTE — VITALS
[Maximal Pain Intensity: 5/10] : 5/10 [Least Pain Intensity: 0/10] : 0/10 [Pain Location: ___] : Pain Location: [unfilled] [OTC] : OTC

## 2022-04-27 NOTE — HISTORY OF PRESENT ILLNESS
[FreeTextEntry1] : Mr. Medrano is a 72 y/o male with h/o kidney transplant (bilateral nephrectomy) in 2018, HTN, DM2 with prostate cancer.   He is being seen in follow up visit.   He is unaccompanied today. \par \par Diagnosis: 3/16/21 Unfavorable intermediate risk prostate cancer, G 4+3=7, PSA 5.27 ng/mL, MRI T2N0\par \par Radiation Treatment: Dr Calderon\par Treatment Site: Prostate/SV   7,000 cGy from 7/09/2021 - 8/27/2021 with ADT\par Clinical Response: Tolerated the treatment well. \par Orgovyx delivered by Dr Phillip \par \par PSA -  \par 5/25/18 -  3.41 ng/mL \par 10/26/20 - 4.29\par 12/7/20 - 5.27\par 10/20/21 - 0.01\par 1/19/22 - 0.03\par \par Interval Labs/Imaging: \par 1/7/22 - US Transplant Kidney: Mild hydronephrosis, increased compared to prior study. Ureteral stent. Redemonstrated urothelial thickening. Elevated resistive indices, similar to prior examinations. No evidence of a significant renal artery stenosis.\par \par 1/19/22 - PSA 0.03 ng/mL\par \par 4/5/22 - underwent excision and fulguration of recurrent anal condyloma with Dr. Kumar (colorectal)\par \par \par 04/20/2022 - He presents today for routine follow-up.  He continues to follow with Dr. Phillip (urology).  \par Mr. Gay is complaining of rectal pain, s/p procedure on 4/5/22... taking Tylenol prn.  Urinary symptoms are stable, has some issues with weak stream and dribbling at times.  \par IPSS 9 / EPIC 23\par

## 2022-05-01 NOTE — REASON FOR VISIT
Pt here for bleedng from umbilical site. No further bleeding from when they left the house approx 1 hr ago per mom, no tenderness to area, pt has been feeding to her norm, acting age appropriate. Will cont to monitor.
[Post Op: _________] : a [unfilled] post op visit

## 2022-05-06 ENCOUNTER — APPOINTMENT (OUTPATIENT)
Dept: COLORECTAL SURGERY | Facility: CLINIC | Age: 72
End: 2022-05-06
Payer: MEDICARE

## 2022-05-06 PROCEDURE — 99212 OFFICE O/P EST SF 10 MIN: CPT

## 2022-05-06 NOTE — HISTORY OF PRESENT ILLNESS
[FreeTextEntry1] : 71M pt of Dr. Kumar's who recently underwent an exam under anesthesia with fulguration of perianal condylomata. Pt seen in post op with healing wounds. Presents to the clinic today with persistent anal discomfort of which he notes that he has been using a topical steroid cream and nifedipine ointment left over from previous conservative management of an anal fissure. He notes that neither have been helpful. Denies any recent fevers or chills.

## 2022-05-06 NOTE — ASSESSMENT
[FreeTextEntry1] : 71M with perianal condylomata s/p fulguration with healing wounds and persistent postoperative pain.

## 2022-05-06 NOTE — PHYSICAL EXAM
[FreeTextEntry1] : Gen: Age appropriate male in NAD\par Abd: Soft, multiple soft hernia, multiple previous abdominal surgical scars, nontender, nondistended.\par Anorectal: The pt was examined in the left lateral decubitus position, upon visual exam of the perineum there are multiple circumferential perianal healing wounds with well granulating tissue beds without overlying exudate.No fissure appreciated.  No surrounding erythema/induration/fluctuance or drainage. Digital exam deferred.

## 2022-05-12 ENCOUNTER — APPOINTMENT (OUTPATIENT)
Dept: NEPHROLOGY | Facility: CLINIC | Age: 72
End: 2022-05-12
Payer: MEDICARE

## 2022-05-12 VITALS
TEMPERATURE: 206.78 F | HEART RATE: 75 BPM | DIASTOLIC BLOOD PRESSURE: 76 MMHG | OXYGEN SATURATION: 99 % | WEIGHT: 212 LBS | RESPIRATION RATE: 18 BRPM | SYSTOLIC BLOOD PRESSURE: 164 MMHG | HEIGHT: 70 IN | BODY MASS INDEX: 30.35 KG/M2

## 2022-05-12 VITALS — DIASTOLIC BLOOD PRESSURE: 70 MMHG | SYSTOLIC BLOOD PRESSURE: 130 MMHG

## 2022-05-12 DIAGNOSIS — N18.4 CHRONIC KIDNEY DISEASE, STAGE 4 (SEVERE): ICD-10-CM

## 2022-05-12 LAB
25(OH)D3 SERPL-MCNC: 33.5 NG/ML
ALBUMIN SERPL ELPH-MCNC: 4.1 G/DL
ALP BLD-CCNC: 94 U/L
ALT SERPL-CCNC: 14 U/L
ANION GAP SERPL CALC-SCNC: 11 MMOL/L
APPEARANCE: ABNORMAL
AST SERPL-CCNC: 24 U/L
BACTERIA: ABNORMAL
BASOPHILS # BLD AUTO: 0.03 K/UL
BASOPHILS NFR BLD AUTO: 0.5 %
BILIRUB SERPL-MCNC: 0.2 MG/DL
BILIRUBIN URINE: NEGATIVE
BLOOD URINE: ABNORMAL
BUN SERPL-MCNC: 22 MG/DL
CALCIUM SERPL-MCNC: 10.2 MG/DL
CALCIUM SERPL-MCNC: 10.2 MG/DL
CHLORIDE SERPL-SCNC: 105 MMOL/L
CHOLEST SERPL-MCNC: 130 MG/DL
CO2 SERPL-SCNC: 25 MMOL/L
COLOR: YELLOW
CREAT SERPL-MCNC: 2.75 MG/DL
CREAT SPEC-SCNC: 204 MG/DL
CREAT/PROT UR: 0.2 RATIO
EGFR: 24 ML/MIN/1.73M2
EOSINOPHIL # BLD AUTO: 0.05 K/UL
EOSINOPHIL NFR BLD AUTO: 0.9 %
ESTIMATED AVERAGE GLUCOSE: 126 MG/DL
GLUCOSE QUALITATIVE U: NEGATIVE
GLUCOSE SERPL-MCNC: 147 MG/DL
HBA1C MFR BLD HPLC: 6 %
HCT VFR BLD CALC: 35.4 %
HDLC SERPL-MCNC: 80 MG/DL
HGB BLD-MCNC: 10.9 G/DL
HYALINE CASTS: 0 /LPF
IMM GRANULOCYTES NFR BLD AUTO: 0.5 %
KETONES URINE: NEGATIVE
LDH SERPL-CCNC: 164 U/L
LDLC SERPL CALC-MCNC: 31 MG/DL
LEUKOCYTE ESTERASE URINE: ABNORMAL
LYMPHOCYTES # BLD AUTO: 1.82 K/UL
LYMPHOCYTES NFR BLD AUTO: 31.1 %
MAGNESIUM SERPL-MCNC: 1.7 MG/DL
MAN DIFF?: NORMAL
MCHC RBC-ENTMCNC: 27.9 PG
MCHC RBC-ENTMCNC: 30.8 GM/DL
MCV RBC AUTO: 90.8 FL
MICROSCOPIC-UA: NORMAL
MONOCYTES # BLD AUTO: 0.46 K/UL
MONOCYTES NFR BLD AUTO: 7.8 %
NEUTROPHILS # BLD AUTO: 3.47 K/UL
NEUTROPHILS NFR BLD AUTO: 59.2 %
NITRITE URINE: NEGATIVE
NONHDLC SERPL-MCNC: 50 MG/DL
PARATHYROID HORMONE INTACT: 54 PG/ML
PH URINE: 5.5
PHOSPHATE SERPL-MCNC: 3.7 MG/DL
PLATELET # BLD AUTO: 176 K/UL
POTASSIUM SERPL-SCNC: 5 MMOL/L
PROT SERPL-MCNC: 6.8 G/DL
PROT UR-MCNC: 49 MG/DL
PROTEIN URINE: ABNORMAL
RBC # BLD: 3.9 M/UL
RBC # FLD: 16.9 %
RED BLOOD CELLS URINE: 23 /HPF
SODIUM SERPL-SCNC: 141 MMOL/L
SPECIFIC GRAVITY URINE: 1.02
SQUAMOUS EPITHELIAL CELLS: 1 /HPF
TACROLIMUS SERPL-MCNC: 7.1 NG/ML
TRIGL SERPL-MCNC: 97 MG/DL
URATE SERPL-MCNC: 7.7 MG/DL
UROBILINOGEN URINE: NORMAL
WBC # FLD AUTO: 5.86 K/UL
WHITE BLOOD CELLS URINE: >720 /HPF

## 2022-05-12 PROCEDURE — 99214 OFFICE O/P EST MOD 30 MIN: CPT

## 2022-05-12 NOTE — HISTORY OF PRESENT ILLNESS
[80: Normal activity with effort; some signs or symptoms of disease.] : 80: Normal activity with effort; some signs or symptoms of disease.  [FreeTextEntry1] : Tube exchanged October 29th.  Pt diagnosed with Covid, wife was diagnosed with Covid on Jan 28th 2021.  On 29th pt was tested in an urgent care, tested positive and went to [facility].  Had no symptoms.  Received the monoclonal antibody on the 29th.  Creatinine in 3's in [facility] and he was admitted and received 5 days of IV antibiotics for urine infection.\par \par Pt admitted June 2nd 2021 for CHELA and hyperkalemia.  Found to have UTI and treated.  Had pseudomonas in urine and received zosyn.  CHELA improved before discharge.  Creatinine decreased to 2.7 before discharge.  Stent internalized\par \par Receiving RT therapy for prostate. Has had admissions with urinary retention after RT started.  \par Saw an oncologist who recently performed labs and mentioned that creatinine was high.  Pt still has armenta catheter and was advised to have a TURP.  Pt will be seeing Dr. Collins this Thursday.  Had a positive urine culture end of September.  \par \par friend Dr. Sean Salinas [phone]. [de-identified] : Pt was admitted to [facility] 11/5 for IV abx prior to planned TURP 11/8.  11/9/21 - s/p TURP and transplant kidney stent replacement.  Velez removed. Pt voided very light pink about 250 cc. \par \par Pt has followed up with Dr. Phillip, urinating well, had 80 cc PVR.  \par \par Pt had anal surgery for fissure, saw Dr. Morrison - has pain but healing.  He is urinating well.   Last creatinine 2.7.

## 2022-05-12 NOTE — PHYSICAL EXAM
[General Appearance - Alert] : alert [General Appearance - In No Acute Distress] : in no acute distress [General Appearance - Well Nourished] : well nourished [Sclera] : the sclera and conjunctiva were normal [Extraocular Movements] : extraocular movements were intact [Neck Appearance] : the appearance of the neck was normal [Neck Cervical Mass (___cm)] : no neck mass was observed [] : no respiratory distress [Respiration, Rhythm And Depth] : normal respiratory rhythm and effort [Exaggerated Use Of Accessory Muscles For Inspiration] : no accessory muscle use [Auscultation Breath Sounds / Voice Sounds] : lungs were clear to auscultation bilaterally [Heart Rate And Rhythm] : heart rate was normal and rhythm regular [Heart Sounds] : normal S1 and S2 [Heart Sounds Gallop] : no gallops [Murmurs] : no murmurs [Edema] : there was no peripheral edema [Bowel Sounds] : normal bowel sounds [Abdomen Soft] : soft [Abdomen Tenderness] : non-tender [Cervical Lymph Nodes Enlarged Posterior Bilaterally] : posterior cervical [Cervical Lymph Nodes Enlarged Anterior Bilaterally] : anterior cervical [Supraclavicular Lymph Nodes Enlarged Bilaterally] : supraclavicular [Abnormal Walk] : normal gait [Skin Turgor] : normal skin turgor [Skin Color & Pigmentation] : normal skin color and pigmentation [No Focal Deficits] : no focal deficits [Oriented To Time, Place, And Person] : oriented to person, place, and time [Impaired Insight] : insight and judgment were intact [Affect] : the affect was normal

## 2022-05-12 NOTE — REASON FOR VISIT
[Initial] : an initial visit for [Kidney Transplant Evaluation] : kidney transplant evaluation [Follow-Up] : a follow-up visit  [FreeTextEntry2] : Dr. Bonilla

## 2022-05-12 NOTE — ASSESSMENT
[FreeTextEntry1] : 1. CKD of kidney transplantation - Pts aaron creatinine 1.9 but had CHELA with pyelonephritis. Has had multiple episodes of CHELA.  Pt in process of relisting but on hold due to prostate cancer.  Cell free DNA 0.2% in October end.  Last creatinine 2.7.  Pt also has internal JJ stent which was exchanged this 11/8/21. Will reach out to Dr. Phillip about next stent exchange. \par 2. Immunosuppression - simulect induction, tacrolimus target 5-7, MMF 250mgs BID.  \par 3. DM2 - on januvia and glimepiride. BG controlled at home.  A1c at goal. \par 4. HTN - controlled at home.\par 5. HCV - genotype 3a. completed Epclusa. Last vl note detected. \par 6. Anemia - due to CKD/CHELA.  last Hb at goal. \par 7. Chronic diarrhea - on and off on lomotil prn. Has seen GI and had a colonoscopy in 2019. Has history of small bowel resection which may be the cause of diarrhea. 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 \par 9.  UTI - last culture positive with MDR organism. \par 10.  COVID19 -  Resolved.  Received monoclonal antibody and recently vaccinated.  \par 11.  Prostate cancer - Completed androgen depletion and completed RT.  Last PSA was low. \par 12.  Urine retention - now s/p TURP.  PVR greatly improved and he is urinating better.  Creatinine has also improved but still with significant CKD.  \par 13.  Anal fissure s/p sugery - using lidocaine jelly for pain.  Following with Dr. Morrison. \par \par f/u in 3 months.

## 2022-05-17 ENCOUNTER — NON-APPOINTMENT (OUTPATIENT)
Age: 72
End: 2022-05-17

## 2022-05-17 LAB
BACTERIA UR CULT: ABNORMAL
BKV DNA SPEC QL NAA+PROBE: NOT DETECTED COPIES/ML
CMV DNA SPEC QL NAA+PROBE: NOT DETECTED IU/ML
CMVPCR LOG: NOT DETECTED LOG10IU/ML

## 2022-05-21 NOTE — PROGRESS NOTE ADULT - SUBJECTIVE AND OBJECTIVE BOX
Transplant Surgery - Multidisciplinary Rounds  --------------------------------------------------------------  R DDRT     Date:    2018      Present:   Patient seen with multidisciplinary team including Transplant Surgeon: Dr. Heath.  Nurse Practitioner:   Silva William and Surgical Resident Kimberly Palomino in am rounds and examined with Dr. Heath.   Disciplines not in attendance will be notified of the plan.     68M with a PMH of ESRD 2/2 bilateral nephrectomy from neoplasm, hypertension, and NIDDM s/p HCV + DDRT (2018) c/b DGF, ATN/mild rejection (2018 treated with steroids) and perinephric collection requiring drainage,previous admission in (2019) with E coli bacteremia 2/2 urosepsis treated w/ IV vanc/cefepime/zosyn.  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 Recently admitted in 2018 w/ severe CHELA 2/2 FK toxicity (level on admission was 39), +UA, was treated w/ cefepime. Patient now w/ uptrending Cr on outpatient labs (3.4), being directly admitted for rejection treatment.     Interval Events:  Pt admitted yesterday received solumedrol 250 mg IV x 1.  This am serum cr downtrending 2.9 <- 3.03.  Mildly hyperkalemic 5.8.    Potential Discharge date: pending clinical improvement     Education:  Medications    Plan of care:  See Below    MEDICATIONS  (STANDING):  atovaquone Suspension 750 milliGRAM(s) Oral two times a day  calcium carbonate    500 mG (Tums) Chewable 1 Tablet(s) Chew at bedtime  citric acid/sodium citrate Solution 15 milliLiter(s) Oral three times a day  dextrose 5%. 1000 milliLiter(s) (50 mL/Hr) IV Continuous <Continuous>  dextrose 50% Injectable 12.5 Gram(s) IV Push once  dextrose 50% Injectable 25 Gram(s) IV Push once  dextrose 50% Injectable 25 Gram(s) IV Push once  gabapentin 300 milliGRAM(s) Oral daily  insulin lispro (HumaLOG) corrective regimen sliding scale   SubCutaneous three times a day before meals  insulin lispro (HumaLOG) corrective regimen sliding scale   SubCutaneous at bedtime  methylPREDNISolone sodium succinate IVPB 250 milliGRAM(s) IV Intermittent once  NIFEdipine XL 60 milliGRAM(s) Oral daily  sodium chloride 0.9%. 1000 milliLiter(s) (70 mL/Hr) IV Continuous <Continuous>  tacrolimus 3 milliGRAM(s) Oral every 12 hours  tamsulosin 0.4 milliGRAM(s) Oral at bedtime    MEDICATIONS  (PRN):  dextrose 40% Gel 15 Gram(s) Oral once PRN Blood Glucose LESS THAN 70 milliGRAM(s)/deciliter  glucagon  Injectable 1 milliGRAM(s) IntraMuscular once PRN Glucose LESS THAN 70 milligrams/deciliter  lidocaine 2% Gel 1 Application(s) Topical two times a day PRN pain      PAST MEDICAL & SURGICAL HISTORY:  Medial meniscus tear: &#x27; 90&#x27;s  Right  Bowel obstruction: &#x27; 2017   surgically treated  Anal fissure: dx: &#x27; 2018   No surgery  Benign prostatic hypertrophy  Hepatitis C: In Donor Kidney: patient received treatment for Hep. C : post treatment: patient  tested Negative for Hep C  Kidney neoplasm: dx: &#x27;  : Left      s/p bilateral Nephrectomy &#x27;   ESRD (end stage renal disease): On Dialysis &#x27;  to 2018  Type 2 diabetes mellitus: dx: &#x27;88  HTN (hypertension)  Kidney transplant recipient: 2018  @ [facility] :  +  Hep C Donor  S/P right knee arthroscopy: &#x27; 90&#x27;s  H/O ileostomy: &#x27; 2017   for 3 months. 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      Vital Signs Last 24 Hrs  T(C): 36.6 (10 Aug 2019 09:00), Max: 37.6 (09 Aug 2019 19:31)  T(F): 97.9 (10 Aug 2019 09:00), Max: 99.7 (09 Aug 2019 19:31)  HR: 90 (10 Aug 2019 09:00) (69 - 95)  BP: 139/65 (10 Aug 2019 09:00) (139/65 - 165/83)  BP(mean): --  RR: 18 (10 Aug 2019 09:) (18 - 18)  SpO2: 97% (10 Aug 2019 09:) (97% - 100%)    I&O's Summary    09 Aug 2019 07:01  -  10 Aug 2019 07:00  --------------------------------------------------------  IN: 480 mL / OUT: 800 mL / NET: -320 mL    10 Aug 2019 07:01  -  10 Aug 2019 12:31  --------------------------------------------------------  IN: 420 mL / OUT: 420 mL / NET: 0 mL                              10.3   8.3   )-----------( 166      ( 10 Aug 2019 06:18 )             32.0     0810    137  |  104  |  36<H>  ----------------------------<  224<H>  5.8<H>   |  19<L>  |  2.90<H>    Ca    9.8      10 Aug 2019 06:18    TPro  7.6  /  Alb  3.8  /  TBili  0.2  /  DBili  x   /  AST  26  /  ALT  31  /  AlkPhos  86  08-10    Tacrolimus (), Serum: 8.6 ng/mL (08-10 @ 08:30)          Review of systems  Gen: No weight changes, fatigue, fevers/chills, weakness  Skin: No rashes  Head/Eyes/Ears/Mouth: No headache; Normal hearing; Normal vision w/o blurriness; No sinus pain/discomfort, sore throat  Respiratory: No dyspnea, cough, wheezing, hemoptysis  CV: No chest pain, PND, orthopnea  GI: C/O mild abdominal pain at surgical site, No diarrhea, constipation, nausea, vomiting, melena, hematochezia  : No increased frequency, dysuria, hematuria, nocturia  MSK: No joint pain/swelling; no back pain; no edema  Neuro: No dizziness/lightheadedness, weakness, seizures, numbness, tingling  Heme: No easy bruising or bleeding  Endo: No heat/cold intolerance  Psych: No significant nervousness, anxiety, stress, depression  All other systems were reviewed and are negative, except as noted.      PHYSICAL EXAM:  Constitutional: Well developed / well nourished  Eyes: Anicteric, PERRLA  ENMT: nc/at  Neck: central line *****************  Respiratory: CTA B/L  Cardiovascular: RRR  Gastrointestinal: Soft abdomen, mild tender to touch at surgical site, ND  Genitourinary: Urinary catheter in place*****Voiding spontaneously  Extremities: SCD's in place and working bilaterally  Vascular: Palpable dp pulses bilaterally  Neurological: A&O x3  Skin: Mild serosanguinous azeem on wound dressing*******Wound open to air no erythema and evidence of infection noted  Musculoskeletal: Moving all extremities  Psychiatric: Responsive Transplant Surgery - Multidisciplinary Rounds  --------------------------------------------------------------  R DDRT     Date:    2018      Present:   Patient seen with multidisciplinary team including Transplant Surgeon: Dr. Heath.  Nurse Practitioner:   Silva William and Surgical Resident Kimberly Palomino in am rounds and examined with Dr. Heath.   Disciplines not in attendance will be notified of the plan.     68M with a PMH of ESRD 2/2 bilateral nephrectomy from neoplasm, hypertension, and NIDDM s/p HCV + DDRT (2018) c/b DGF, ATN/mild rejection (2018 treated with steroids) and perinephric collection requiring drainage,previous admission in (2019) with E coli bacteremia 2/2 urosepsis treated w/ IV vanc/cefepime/zosyn.  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 Recently admitted in 2018 w/ severe CHELA 2/2 FK toxicity (level on admission was 39), +UA, was treated w/ cefepime. Patient now w/ uptrending Cr on outpatient labs (3.4), being directly admitted for rejection treatment.     Interval Events:  Pt admitted yesterday received solumedrol 250 mg IV x 1.  This am serum cr downtrending 2.9 <- 3.03.  Mildly hyperkalemic 5.8.    Potential Discharge date: pending clinical improvement     Education:  Medications    Plan of care:  See Below    MEDICATIONS  (STANDING):  atovaquone Suspension 750 milliGRAM(s) Oral two times a day  calcium carbonate    500 mG (Tums) Chewable 1 Tablet(s) Chew at bedtime  citric acid/sodium citrate Solution 15 milliLiter(s) Oral three times a day  dextrose 5%. 1000 milliLiter(s) (50 mL/Hr) IV Continuous <Continuous>  dextrose 50% Injectable 12.5 Gram(s) IV Push once  dextrose 50% Injectable 25 Gram(s) IV Push once  dextrose 50% Injectable 25 Gram(s) IV Push once  gabapentin 300 milliGRAM(s) Oral daily  insulin lispro (HumaLOG) corrective regimen sliding scale   SubCutaneous three times a day before meals  insulin lispro (HumaLOG) corrective regimen sliding scale   SubCutaneous at bedtime  methylPREDNISolone sodium succinate IVPB 250 milliGRAM(s) IV Intermittent once  NIFEdipine XL 60 milliGRAM(s) Oral daily  sodium chloride 0.9%. 1000 milliLiter(s) (70 mL/Hr) IV Continuous <Continuous>  tacrolimus 3 milliGRAM(s) Oral every 12 hours  tamsulosin 0.4 milliGRAM(s) Oral at bedtime    MEDICATIONS  (PRN):  dextrose 40% Gel 15 Gram(s) Oral once PRN Blood Glucose LESS THAN 70 milliGRAM(s)/deciliter  glucagon  Injectable 1 milliGRAM(s) IntraMuscular once PRN Glucose LESS THAN 70 milligrams/deciliter  lidocaine 2% Gel 1 Application(s) Topical two times a day PRN pain      PAST MEDICAL & SURGICAL HISTORY:  Medial meniscus tear: &#x27; 90&#x27;s  Right  Bowel obstruction: &#x27; 2017   surgically treated  Anal fissure: dx: &#x27; 2018   No surgery  Benign prostatic hypertrophy  Hepatitis C: In Donor Kidney: patient received treatment for Hep. C : post treatment: patient  tested Negative for Hep C  Kidney neoplasm: dx: &#x27;  : Left      s/p bilateral Nephrectomy &#x27;   ESRD (end stage renal disease): On Dialysis &#x27;  to 2018  Type 2 diabetes mellitus: dx: &#x27;88  HTN (hypertension)  Kidney transplant recipient: 2018  @ [facility] :  +  Hep C Donor  S/P right knee arthroscopy: &#x27; 90&#x27;s  H/O ileostomy: &#x27; 2017   for 3 months. 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      Vital Signs Last 24 Hrs  T(C): 36.6 (10 Aug 2019 09:00), Max: 37.6 (09 Aug 2019 19:31)  T(F): 97.9 (10 Aug 2019 09:00), Max: 99.7 (09 Aug 2019 19:31)  HR: 90 (10 Aug 2019 09:00) (69 - 95)  BP: 139/65 (10 Aug 2019 09:00) (139/65 - 165/83)  BP(mean): --  RR: 18 (10 Aug 2019 09:) (18 - 18)  SpO2: 97% (10 Aug 2019 09:) (97% - 100%)    I&O's Summary    09 Aug 2019 07:01  -  10 Aug 2019 07:00  --------------------------------------------------------  IN: 480 mL / OUT: 800 mL / NET: -320 mL    10 Aug 2019 07:01  -  10 Aug 2019 12:31  --------------------------------------------------------  IN: 420 mL / OUT: 420 mL / NET: 0 mL                              10.3   8.3   )-----------( 166      ( 10 Aug 2019 06:18 )             32.0     0810    137  |  104  |  36<H>  ----------------------------<  224<H>  5.8<H>   |  19<L>  |  2.90<H>    Ca    9.8      10 Aug 2019 06:18    TPro  7.6  /  Alb  3.8  /  TBili  0.2  /  DBili  x   /  AST  26  /  ALT  31  /  AlkPhos  86  08-10    Tacrolimus (), Serum: 8.6 ng/mL (08-10 @ 08:30)          Review of systems  Gen: No weight changes, fatigue, fevers/chills, weakness  Skin: No rashes  Head/Eyes/Ears/Mouth: No headache; Normal hearing; Normal vision w/o blurriness; No sinus pain/discomfort, sore throat  Respiratory: No dyspnea, cough, wheezing, hemoptysis  CV: No chest pain, PND, orthopnea  GI: No abdominal pain, No diarrhea, constipation, nausea, vomiting, melena, hematochezia  : No increased frequency, dysuria, hematuria, nocturia  MSK: No joint pain/swelling; no back pain; no edema  Neuro: No dizziness/lightheadedness, weakness, seizures, numbness, tingling  Heme: No easy bruising or bleeding  Endo: No heat/cold intolerance  Psych: No significant nervousness, anxiety, stress, depression  All other systems were reviewed and are negative, except as noted.      PHYSICAL EXAM:  Constitutional: Well developed / well nourished  Eyes: Anicteric, PERRLA  ENMT: nc/at  Neck: supple  Respiratory: CTA B/L  Cardiovascular: RRR  Gastrointestinal: Soft abdomen, mild tender to touch at surgical site, ND  Genitourinary: Voiding spontaneously  Extremities: SCD's in place and working bilaterally  Vascular: Palpable dp pulses bilaterally  Neurological: A&O x3  Skin: no rashes or lesions  Musculoskeletal: Moving all extremities  Psychiatric: Responsive 4 = No assist / stand by assistance

## 2022-06-06 NOTE — ASU DISCHARGE PLAN (ADULT/PEDIATRIC). - MEDICATION SUMMARY - MEDICATIONS TO STOP TAKING
normal mood with appropriate affect
I will STOP taking the medications listed below when I get home from the hospital:  None

## 2022-06-21 ENCOUNTER — APPOINTMENT (OUTPATIENT)
Dept: ORTHOPEDIC SURGERY | Facility: CLINIC | Age: 72
End: 2022-06-21
Payer: MEDICARE

## 2022-06-21 VITALS — BODY MASS INDEX: 30.35 KG/M2 | WEIGHT: 212 LBS | HEIGHT: 70 IN

## 2022-06-21 PROCEDURE — 99204 OFFICE O/P NEW MOD 45 MIN: CPT | Mod: 25

## 2022-06-21 PROCEDURE — 20611 DRAIN/INJ JOINT/BURSA W/US: CPT

## 2022-06-21 PROCEDURE — 73564 X-RAY EXAM KNEE 4 OR MORE: CPT | Mod: LT

## 2022-06-21 NOTE — HISTORY OF PRESENT ILLNESS
[Sudden] : sudden [5] : 5 [3] : 3 [Dull/Aching] : dull/aching [Rest] : rest [Walking] : walking [de-identified] : 06/21/2022 Mr. JUAN CARLOS COPELAND,  71 year male , presents today for left knee/leg. Reports medial left knee pain which can be present over the knee and into the lower leg. States that his pain has been present over the past one month\par \par  [] : no [FreeTextEntry1] : lt knee  [FreeTextEntry5] :  JUAN CARLOS COPELAND is a 71 year male who is here today for lt knee pain. he has been having pain for about 3 weeks.

## 2022-06-21 NOTE — DISCUSSION/SUMMARY
[de-identified] : 71m with left knee djd\par 1) aspiration left knee today - 37cc\par 2) euflexxa #1 left knee, tolerated well \par 3) cryotherapy, rest and activity modification\par 4) rtc 1 week for inj\par \par Entered by Katty Newton acting as scribe.\par

## 2022-06-21 NOTE — PHYSICAL EXAM
[NL (0)] : extension 0 degrees [Left] : left knee [AP] : anteroposterior [Lateral] : lateral [[place]] : skyline [AP Standing] : anteroposterior standing [advanced tricompartmental OA with lateral compartment narrowing and valgus alignment] : advanced tricompartmental OA with lateral compartment narrowing and valgus alignment [Advanced patellofemoral OA] : Advanced patellofemoral OA [] : no atrophy [TWNoteComboBox7] : flexion 130 degrees

## 2022-06-21 NOTE — PROCEDURE
[Effusion] : effusion [de-identified] : 37cc [de-identified] : clear/straw [FreeTextEntry3] : Large joint injection was performed  of the left knee. The indication for this procedure was x-ray evidence of Osteoarthritis on this or prior visit. The site was prepped with alcohol and betadine. An injection of Lidocaine 3cc of 1% , Euflexxa, # [ ]  was used. \par \par Aspiration was indicated due to effusion. The amount aspirated was [ ] cc. The amount aspirated was[ ]. \par Patient was advised to call if redness, pain or fever occur and apply ice for 15 minutes out of every hour for the next 12-24 hours as tolerated. \par \par Patient has tried OTC's including aspirin, Ibuprofen, Aleve, etc or prescription NSAIDS, and/or exercises at home and/or physical therapy without satisfactory response, patient had decreased mobility in the joint and the risks benefits, and alternatives have been discussed, and verbal consent was obtained. \par \par The risks, benefits and contents of the injection have been discussed.  Risks include but are not limited to allergic reaction, flare reaction, permanent white skin discoloration at the injection site and infection.  The patient understands the risks and agrees to having the injection.  All questions have been answered.\par \par Ultrasound guidance was indicated for this patient due to prior failure or difficult injection.\par \par

## 2022-06-28 ENCOUNTER — APPOINTMENT (OUTPATIENT)
Dept: ORTHOPEDIC SURGERY | Facility: CLINIC | Age: 72
End: 2022-06-28

## 2022-06-28 VITALS — WEIGHT: 212 LBS | HEIGHT: 70 IN | BODY MASS INDEX: 30.35 KG/M2

## 2022-06-28 PROCEDURE — 20611 DRAIN/INJ JOINT/BURSA W/US: CPT

## 2022-06-28 PROCEDURE — 99212 OFFICE O/P EST SF 10 MIN: CPT | Mod: 25

## 2022-06-28 NOTE — HISTORY OF PRESENT ILLNESS
[5] : 5 [3] : 3 [Sudden] : sudden [Dull/Aching] : dull/aching [Rest] : rest [Walking] : walking [2] : 2 [Euflexxa] : Euflexxa [de-identified] : 06/21/22 [de-identified] : left knee [de-identified] : Euflexxa [TWNoteComboBox1] : 60% [de-identified] : 6/28/22: f/up L knee, to continue euflexxa series\par 06/21/2022 Mr. JUAN CARLOS COPELAND,  71 year male , presents today for left knee/leg. Reports medial left knee pain which can be present over the knee and into the lower leg. States that his pain has been present over the past one month\par \par  [] : no [FreeTextEntry5] :  JUAN CARLOS COPELAND is a 71 year male who is here today for lt knee pain. he has been having pain for about 3 weeks.  [FreeTextEntry1] : lt knee

## 2022-06-28 NOTE — PHYSICAL EXAM
[NL (0)] : extension 0 degrees [Left] : left knee [AP] : anteroposterior [Lateral] : lateral [[place]] : skyline [AP Standing] : anteroposterior standing [advanced tricompartmental OA with lateral compartment narrowing and valgus alignment] : advanced tricompartmental OA with lateral compartment narrowing and valgus alignment [Advanced patellofemoral OA] : Advanced patellofemoral OA [] : no atrophy [TWNoteComboBox7] : flexion 130 degrees

## 2022-06-28 NOTE — PHYSICAL EXAM
[NL (0)] : extension 0 degrees [Left] : left knee [AP] : anteroposterior [Lateral] : lateral [[employer]] : skyline [AP Standing] : anteroposterior standing [advanced tricompartmental OA with lateral compartment narrowing and valgus alignment] : advanced tricompartmental OA with lateral compartment narrowing and valgus alignment [Advanced patellofemoral OA] : Advanced patellofemoral OA [] : no atrophy [TWNoteComboBox7] : flexion 130 degrees

## 2022-06-28 NOTE — PROCEDURE
[Effusion] : effusion [de-identified] : 37cc [de-identified] : clear/straw [FreeTextEntry3] : Large joint injection was performed  of the left knee. The indication for this procedure was x-ray evidence of Osteoarthritis on this or prior visit. The site was prepped with alcohol and betadine. An injection of Lidocaine 3cc of 1% , Euflexxa, # 2  was used. \par \par \par Patient was advised to call if redness, pain or fever occur and apply ice for 15 minutes out of every hour for the next 12-24 hours as tolerated. \par \par Patient has tried OTC's including aspirin, Ibuprofen, Aleve, etc or prescription NSAIDS, and/or exercises at home and/or physical therapy without satisfactory response, patient had decreased mobility in the joint and the risks benefits, and alternatives have been discussed, and verbal consent was obtained. \par \par The risks, benefits and contents of the injection have been discussed.  Risks include but are not limited to allergic reaction, flare reaction, permanent white skin discoloration at the injection site and infection.  The patient understands the risks and agrees to having the injection.  All questions have been answered.\par \par Ultrasound guidance was indicated for this patient due to prior failure or difficult injection.\par \par

## 2022-06-28 NOTE — HISTORY OF PRESENT ILLNESS
[5] : 5 [3] : 3 [Sudden] : sudden [Dull/Aching] : dull/aching [Rest] : rest [Walking] : walking [2] : 2 [Euflexxa] : Euflexxa [de-identified] : 06/21/22 [de-identified] : left knee [de-identified] : Euflexxa [TWNoteComboBox1] : 60% [de-identified] : 6/28/22: f/up L knee, to continue euflexxa series\par 06/21/2022 Mr. JUAN CARLOS COPELAND,  71 year male , presents today for left knee/leg. Reports medial left knee pain which can be present over the knee and into the lower leg. States that his pain has been present over the past one month\par \par  [] : no [FreeTextEntry5] :  JUAN CARLOS COPELAND is a 71 year male who is here today for lt knee pain. he has been having pain for about 3 weeks.  [FreeTextEntry1] : lt knee

## 2022-06-28 NOTE — PROCEDURE
[Effusion] : effusion [de-identified] : 37cc [de-identified] : clear/straw [FreeTextEntry3] : Large joint injection was performed  of the left knee. The indication for this procedure was x-ray evidence of Osteoarthritis on this or prior visit. The site was prepped with alcohol and betadine. An injection of Lidocaine 3cc of 1% , Euflexxa, # 2  was used. \par \par \par Patient was advised to call if redness, pain or fever occur and apply ice for 15 minutes out of every hour for the next 12-24 hours as tolerated. \par \par Patient has tried OTC's including aspirin, Ibuprofen, Aleve, etc or prescription NSAIDS, and/or exercises at home and/or physical therapy without satisfactory response, patient had decreased mobility in the joint and the risks benefits, and alternatives have been discussed, and verbal consent was obtained. \par \par The risks, benefits and contents of the injection have been discussed.  Risks include but are not limited to allergic reaction, flare reaction, permanent white skin discoloration at the injection site and infection.  The patient understands the risks and agrees to having the injection.  All questions have been answered.\par \par Ultrasound guidance was indicated for this patient due to prior failure or difficult injection.\par \par

## 2022-06-28 NOTE — DISCUSSION/SUMMARY
[de-identified] : 71m with left knee djd. Euflexxa #2 Injection tolerated well. Post injection instructions reviewed.\par 1) wbat, cryotherapy\par 2) rtc 1 week\par \par \par Entered by Katty Newton acting as scribe.\par

## 2022-06-28 NOTE — DISCUSSION/SUMMARY
[de-identified] : 71m with left knee djd. Euflexxa #2 Injection tolerated well. Post injection instructions reviewed.\par 1) wbat, cryotherapy\par 2) rtc 1 week\par \par \par Entered by Katty Newton acting as scribe.\par

## 2022-06-29 NOTE — PATIENT PROFILE ADULT - TOBACCO USE
Never smoker Thalidomide Pregnancy And Lactation Text: This medication is Pregnancy Category X and is absolutely contraindicated during pregnancy. It is unknown if it is excreted in breast milk.

## 2022-07-05 ENCOUNTER — APPOINTMENT (OUTPATIENT)
Dept: ORTHOPEDIC SURGERY | Facility: CLINIC | Age: 72
End: 2022-07-05

## 2022-07-05 VITALS — BODY MASS INDEX: 30.35 KG/M2 | WEIGHT: 212 LBS | HEIGHT: 70 IN

## 2022-07-05 PROCEDURE — 20610 DRAIN/INJ JOINT/BURSA W/O US: CPT | Mod: LT

## 2022-07-05 PROCEDURE — 99214 OFFICE O/P EST MOD 30 MIN: CPT | Mod: 25

## 2022-07-05 NOTE — PHYSICAL EXAM
[NL (0)] : extension 0 degrees [Left] : left knee [AP] : anteroposterior [Lateral] : lateral [[place]] : skyline [AP Standing] : anteroposterior standing [advanced tricompartmental OA with lateral compartment narrowing and valgus alignment] : advanced tricompartmental OA with lateral compartment narrowing and valgus alignment [Advanced patellofemoral OA] : Advanced patellofemoral OA [] : no atrophy [TWNoteComboBox7] : flexion 130 degrees

## 2022-07-05 NOTE — DISCUSSION/SUMMARY
[de-identified] : 71m with left knee djd. Euflexxa #2 Injection tolerated well. Post injection instructions reviewed.\par 1) wbat, cryotherapy\par 2) rtc 1 week\par \par \par 7/5/22: Patient received left knee Euflexxa #3 today.

## 2022-07-05 NOTE — HISTORY OF PRESENT ILLNESS
[5] : 5 [3] : 3 [Dull/Aching] : dull/aching [Radiating] : radiating [Injection therapy] : injection therapy [Walking] : walking [de-identified] : 07/05/22 JUAN CARLOS COPELAND 71 year old M here for eval of left knee. Pt feels worst since last visit when walking.\par \par  [] : no [FreeTextEntry1] : left knee [FreeTextEntry7] : down the left leg [FreeTextEntry9] : walker [de-identified] : Dr. Napoles

## 2022-07-05 NOTE — PROCEDURE
[FreeTextEntry3] : Large joint injection was performed  of the left knee. The indication for this procedure was x-ray evidence of Osteoarthritis on this or prior visit. The site was prepped with alcohol and betadine. An injection of Lidocaine 3cc of 1% , Euflexxa, # 3  was used. \par \par Patient was advised to call if redness, pain or fever occur and apply ice for 15 minutes out of every hour for the next 12-24 hours as tolerated. \par \par Patient has tried OTC's including aspirin, Ibuprofen, Aleve, etc or prescription NSAIDS, and/or exercises at home and/or physical therapy without satisfactory response, patient had decreased mobility in the joint and the risks benefits, and alternatives have been discussed, and verbal consent was obtained. \par \par The risks, benefits and contents of the injection have been discussed.  Risks include but are not limited to allergic reaction, flare reaction, permanent white skin discoloration at the injection site and infection.  The patient understands the risks and agrees to having the injection.  All questions have been answered.\par \par Ultrasound guidance was indicated for this patient due to prior failure or difficult injection.\par \par

## 2022-07-08 ENCOUNTER — TRANSCRIPTION ENCOUNTER (OUTPATIENT)
Age: 72
End: 2022-07-08

## 2022-07-12 ENCOUNTER — APPOINTMENT (OUTPATIENT)
Dept: ORTHOPEDIC SURGERY | Facility: CLINIC | Age: 72
End: 2022-07-12

## 2022-07-12 VITALS — HEIGHT: 70 IN | WEIGHT: 212 LBS | BODY MASS INDEX: 30.35 KG/M2

## 2022-07-12 PROCEDURE — 99213 OFFICE O/P EST LOW 20 MIN: CPT

## 2022-07-12 NOTE — REASON FOR VISIT
[FreeTextEntry2] : follow up/ lt knee. completed seires of injections last week. overall improved but still with some discomfort
[phone]

## 2022-07-12 NOTE — PHYSICAL EXAM
[Left] : left knee [5___] : hamstring 5[unfilled]/5 [] : mildly antalgic [TWNoteComboBox7] : flexion 125 degrees [de-identified] : extension 0 degrees

## 2022-07-12 NOTE — HISTORY OF PRESENT ILLNESS
[4] : 4 [0] : 0 [FreeTextEntry1] : lt knee  [FreeTextEntry5] :  JUAN CARLOS COPELAND is a 71 year male who is here today for follow up on lt knee. He is doing better since last visit. He states he is better since the inj but has pain occasional pain when standing for a few mins and sitting with his leg hanging

## 2022-07-12 NOTE — DISCUSSION/SUMMARY
[de-identified] : 71 male with left knee djd. completed euflexxa.\par 1) wbat\par 2) cryotherapy\par 3) hep\par 4) rtc 4 weeks. if no improvement will discuss csi and aspiration if necessary

## 2022-07-16 ENCOUNTER — INPATIENT (INPATIENT)
Facility: HOSPITAL | Age: 72
LOS: 4 days | Discharge: HOME HEALTH | DRG: 690 | End: 2022-07-21
Attending: INTERNAL MEDICINE | Admitting: INTERNAL MEDICINE
Payer: MEDICARE

## 2022-07-16 VITALS
RESPIRATION RATE: 20 BRPM | WEIGHT: 210.1 LBS | HEIGHT: 70 IN | OXYGEN SATURATION: 98 % | HEART RATE: 95 BPM | DIASTOLIC BLOOD PRESSURE: 75 MMHG | TEMPERATURE: 99 F | SYSTOLIC BLOOD PRESSURE: 139 MMHG

## 2022-07-16 DIAGNOSIS — I77.0 ARTERIOVENOUS FISTULA, ACQUIRED: Chronic | ICD-10-CM

## 2022-07-16 DIAGNOSIS — Z98.890 OTHER SPECIFIED POSTPROCEDURAL STATES: Chronic | ICD-10-CM

## 2022-07-16 DIAGNOSIS — Z90.5 ACQUIRED ABSENCE OF KIDNEY: Chronic | ICD-10-CM

## 2022-07-16 DIAGNOSIS — Z90.79 ACQUIRED ABSENCE OF OTHER GENITAL ORGAN(S): Chronic | ICD-10-CM

## 2022-07-16 DIAGNOSIS — Z94.0 KIDNEY TRANSPLANT STATUS: Chronic | ICD-10-CM

## 2022-07-16 DIAGNOSIS — E87.5 HYPERKALEMIA: ICD-10-CM

## 2022-07-16 LAB
ALBUMIN SERPL ELPH-MCNC: 3.8 G/DL — SIGNIFICANT CHANGE UP (ref 3.3–5)
ALP SERPL-CCNC: 80 U/L — SIGNIFICANT CHANGE UP (ref 40–120)
ALT FLD-CCNC: 15 U/L — SIGNIFICANT CHANGE UP (ref 10–45)
ANION GAP SERPL CALC-SCNC: 11 MMOL/L — SIGNIFICANT CHANGE UP (ref 5–17)
ANION GAP SERPL CALC-SCNC: 11 MMOL/L — SIGNIFICANT CHANGE UP (ref 5–17)
APPEARANCE UR: ABNORMAL
APTT BLD: 31.3 SEC — SIGNIFICANT CHANGE UP (ref 27.5–35.5)
AST SERPL-CCNC: 30 U/L — SIGNIFICANT CHANGE UP (ref 10–40)
BACTERIA # UR AUTO: NEGATIVE — SIGNIFICANT CHANGE UP
BASE EXCESS BLDV CALC-SCNC: 1.7 MMOL/L — SIGNIFICANT CHANGE UP (ref -2–2)
BASOPHILS # BLD AUTO: 0.02 K/UL — SIGNIFICANT CHANGE UP (ref 0–0.2)
BASOPHILS NFR BLD AUTO: 0.2 % — SIGNIFICANT CHANGE UP (ref 0–2)
BILIRUB SERPL-MCNC: 0.2 MG/DL — SIGNIFICANT CHANGE UP (ref 0.2–1.2)
BILIRUB UR-MCNC: NEGATIVE — SIGNIFICANT CHANGE UP
BUN SERPL-MCNC: 29 MG/DL — HIGH (ref 7–23)
BUN SERPL-MCNC: 33 MG/DL — HIGH (ref 7–23)
CA-I SERPL-SCNC: 1.44 MMOL/L — HIGH (ref 1.15–1.33)
CALCIUM SERPL-MCNC: 10.3 MG/DL — SIGNIFICANT CHANGE UP (ref 8.4–10.5)
CALCIUM SERPL-MCNC: 9.9 MG/DL — SIGNIFICANT CHANGE UP (ref 8.4–10.5)
CHLORIDE BLDV-SCNC: 108 MMOL/L — SIGNIFICANT CHANGE UP (ref 96–108)
CHLORIDE SERPL-SCNC: 106 MMOL/L — SIGNIFICANT CHANGE UP (ref 96–108)
CHLORIDE SERPL-SCNC: 107 MMOL/L — SIGNIFICANT CHANGE UP (ref 96–108)
CO2 BLDV-SCNC: 30 MMOL/L — HIGH (ref 22–26)
CO2 SERPL-SCNC: 21 MMOL/L — LOW (ref 22–31)
CO2 SERPL-SCNC: 23 MMOL/L — SIGNIFICANT CHANGE UP (ref 22–31)
COLOR SPEC: YELLOW — SIGNIFICANT CHANGE UP
CREAT SERPL-MCNC: 2.68 MG/DL — HIGH (ref 0.5–1.3)
CREAT SERPL-MCNC: 2.91 MG/DL — HIGH (ref 0.5–1.3)
DIFF PNL FLD: ABNORMAL
EGFR: 22 ML/MIN/1.73M2 — LOW
EGFR: 24 ML/MIN/1.73M2 — LOW
EOSINOPHIL # BLD AUTO: 0.06 K/UL — SIGNIFICANT CHANGE UP (ref 0–0.5)
EOSINOPHIL NFR BLD AUTO: 0.7 % — SIGNIFICANT CHANGE UP (ref 0–6)
EPI CELLS # UR: 0 /HPF — SIGNIFICANT CHANGE UP
FLUAV AG NPH QL: SIGNIFICANT CHANGE UP
FLUBV AG NPH QL: SIGNIFICANT CHANGE UP
GAS PNL BLDV: 139 MMOL/L — SIGNIFICANT CHANGE UP (ref 136–145)
GAS PNL BLDV: SIGNIFICANT CHANGE UP
GAS PNL BLDV: SIGNIFICANT CHANGE UP
GLUCOSE BLDV-MCNC: 124 MG/DL — HIGH (ref 70–99)
GLUCOSE SERPL-MCNC: 121 MG/DL — HIGH (ref 70–99)
GLUCOSE SERPL-MCNC: 92 MG/DL — SIGNIFICANT CHANGE UP (ref 70–99)
GLUCOSE UR QL: NEGATIVE — SIGNIFICANT CHANGE UP
HCO3 BLDV-SCNC: 28 MMOL/L — SIGNIFICANT CHANGE UP (ref 22–29)
HCT VFR BLD CALC: 33.6 % — LOW (ref 39–50)
HCT VFR BLDA CALC: 31 % — LOW (ref 39–51)
HGB BLD CALC-MCNC: 10.4 G/DL — LOW (ref 12.6–17.4)
HGB BLD-MCNC: 10.1 G/DL — LOW (ref 13–17)
HYALINE CASTS # UR AUTO: 2 /LPF — SIGNIFICANT CHANGE UP (ref 0–2)
IMM GRANULOCYTES NFR BLD AUTO: 0.5 % — SIGNIFICANT CHANGE UP (ref 0–1.5)
INR BLD: 0.99 RATIO — SIGNIFICANT CHANGE UP (ref 0.88–1.16)
KETONES UR-MCNC: NEGATIVE — SIGNIFICANT CHANGE UP
LACTATE BLDV-MCNC: 1.6 MMOL/L — SIGNIFICANT CHANGE UP (ref 0.7–2)
LEUKOCYTE ESTERASE UR-ACNC: ABNORMAL
LIDOCAIN IGE QN: 42 U/L — SIGNIFICANT CHANGE UP (ref 7–60)
LYMPHOCYTES # BLD AUTO: 2.51 K/UL — SIGNIFICANT CHANGE UP (ref 1–3.3)
LYMPHOCYTES # BLD AUTO: 29.7 % — SIGNIFICANT CHANGE UP (ref 13–44)
MCHC RBC-ENTMCNC: 28.1 PG — SIGNIFICANT CHANGE UP (ref 27–34)
MCHC RBC-ENTMCNC: 30.1 GM/DL — LOW (ref 32–36)
MCV RBC AUTO: 93.6 FL — SIGNIFICANT CHANGE UP (ref 80–100)
MONOCYTES # BLD AUTO: 0.61 K/UL — SIGNIFICANT CHANGE UP (ref 0–0.9)
MONOCYTES NFR BLD AUTO: 7.2 % — SIGNIFICANT CHANGE UP (ref 2–14)
NEUTROPHILS # BLD AUTO: 5.22 K/UL — SIGNIFICANT CHANGE UP (ref 1.8–7.4)
NEUTROPHILS NFR BLD AUTO: 61.7 % — SIGNIFICANT CHANGE UP (ref 43–77)
NITRITE UR-MCNC: NEGATIVE — SIGNIFICANT CHANGE UP
NRBC # BLD: 0 /100 WBCS — SIGNIFICANT CHANGE UP (ref 0–0)
PCO2 BLDV: 52 MMHG — SIGNIFICANT CHANGE UP (ref 42–55)
PH BLDV: 7.34 — SIGNIFICANT CHANGE UP (ref 7.32–7.43)
PH UR: 6 — SIGNIFICANT CHANGE UP (ref 5–8)
PLATELET # BLD AUTO: 175 K/UL — SIGNIFICANT CHANGE UP (ref 150–400)
PO2 BLDV: 32 MMHG — SIGNIFICANT CHANGE UP (ref 25–45)
POTASSIUM BLDV-SCNC: 5.7 MMOL/L — HIGH (ref 3.5–5.1)
POTASSIUM SERPL-MCNC: 5.3 MMOL/L — SIGNIFICANT CHANGE UP (ref 3.5–5.3)
POTASSIUM SERPL-MCNC: 5.9 MMOL/L — HIGH (ref 3.5–5.3)
POTASSIUM SERPL-SCNC: 5.3 MMOL/L — SIGNIFICANT CHANGE UP (ref 3.5–5.3)
POTASSIUM SERPL-SCNC: 5.9 MMOL/L — HIGH (ref 3.5–5.3)
PROT SERPL-MCNC: 7 G/DL — SIGNIFICANT CHANGE UP (ref 6–8.3)
PROT UR-MCNC: ABNORMAL
PROTHROM AB SERPL-ACNC: 11.4 SEC — SIGNIFICANT CHANGE UP (ref 10.5–13.4)
RBC # BLD: 3.59 M/UL — LOW (ref 4.2–5.8)
RBC # FLD: 16.7 % — HIGH (ref 10.3–14.5)
RBC CASTS # UR COMP ASSIST: 165 /HPF — HIGH (ref 0–4)
RSV RNA NPH QL NAA+NON-PROBE: SIGNIFICANT CHANGE UP
SAO2 % BLDV: 38.1 % — LOW (ref 67–88)
SARS-COV-2 RNA SPEC QL NAA+PROBE: SIGNIFICANT CHANGE UP
SODIUM SERPL-SCNC: 139 MMOL/L — SIGNIFICANT CHANGE UP (ref 135–145)
SODIUM SERPL-SCNC: 140 MMOL/L — SIGNIFICANT CHANGE UP (ref 135–145)
SP GR SPEC: 1.02 — SIGNIFICANT CHANGE UP (ref 1.01–1.02)
UROBILINOGEN FLD QL: NEGATIVE — SIGNIFICANT CHANGE UP
WBC # BLD: 8.46 K/UL — SIGNIFICANT CHANGE UP (ref 3.8–10.5)
WBC # FLD AUTO: 8.46 K/UL — SIGNIFICANT CHANGE UP (ref 3.8–10.5)
WBC UR QL: 235 /HPF — HIGH (ref 0–5)

## 2022-07-16 PROCEDURE — 74176 CT ABD & PELVIS W/O CONTRAST: CPT | Mod: 26,MA

## 2022-07-16 PROCEDURE — 99285 EMERGENCY DEPT VISIT HI MDM: CPT | Mod: 25

## 2022-07-16 PROCEDURE — 71045 X-RAY EXAM CHEST 1 VIEW: CPT | Mod: 26

## 2022-07-16 PROCEDURE — 93010 ELECTROCARDIOGRAM REPORT: CPT

## 2022-07-16 RX ORDER — SODIUM ZIRCONIUM CYCLOSILICATE 10 G/10G
10 POWDER, FOR SUSPENSION ORAL ONCE
Refills: 0 | Status: COMPLETED | OUTPATIENT
Start: 2022-07-16 | End: 2022-07-16

## 2022-07-16 RX ORDER — PIPERACILLIN AND TAZOBACTAM 4; .5 G/20ML; G/20ML
3.38 INJECTION, POWDER, LYOPHILIZED, FOR SOLUTION INTRAVENOUS ONCE
Refills: 0 | Status: COMPLETED | OUTPATIENT
Start: 2022-07-16 | End: 2022-07-16

## 2022-07-16 RX ORDER — SODIUM CHLORIDE 9 MG/ML
500 INJECTION, SOLUTION INTRAVENOUS ONCE
Refills: 0 | Status: COMPLETED | OUTPATIENT
Start: 2022-07-16 | End: 2022-07-16

## 2022-07-16 RX ADMIN — SODIUM ZIRCONIUM CYCLOSILICATE 10 GRAM(S): 10 POWDER, FOR SUSPENSION ORAL at 17:04

## 2022-07-16 RX ADMIN — PIPERACILLIN AND TAZOBACTAM 200 GRAM(S): 4; .5 INJECTION, POWDER, LYOPHILIZED, FOR SOLUTION INTRAVENOUS at 19:02

## 2022-07-16 RX ADMIN — SODIUM CHLORIDE 500 MILLILITER(S): 9 INJECTION, SOLUTION INTRAVENOUS at 16:09

## 2022-07-16 NOTE — ED ADULT NURSE REASSESSMENT NOTE - NS ED NURSE REASSESS COMMENT FT1
Velez catheter placed using sterile technique. Second RN present to confirm sterility. Draining to gravity. Secured w/ stat lock. Initial output off approx. 1000_cc's urine. Sterile specimens collected and sent to lab. Patient tolerated procedure well. Will cont to monitor.

## 2022-07-16 NOTE — ED ADULT NURSE NOTE - OBJECTIVE STATEMENT
Patient is a 71 year old male complaining of constipation. Patient has history of kidney transplant, dm, sbo. Patient is A&O x 4. Pt reports constipation x 2 days with urinary frequency and difficulty emptying bladder x 2 weeks. Denies complaints of chest pain, sob, fevers, chills, n/v/d, headache, syncope, burning urination, blood in urine, blood in stool. Safety and comfort maintained. Will continue to monitor.

## 2022-07-16 NOTE — ED PROVIDER NOTE - CLINICAL SUMMARY MEDICAL DECISION MAKING FREE TEXT BOX
71M PMH HTN, DM-2, RCC (left renal cancer, right renal lesions) s/p bilateral nephrectomy (2015), HCV due to positive kidney donor s/p treatment, was on dialysis for 3 years before DDRT at [facility] in 2018, ureteral stricture of transplant kidney s/p ureteral stent, prostate CA s/p radiation therapy, recurrent Pseudomonal UTIs (hospitalized 6/21; 8/21), previous SBO s/p ileostomy w/ reversal 2017, BPH, UTI's with urinary retention p/w abd pain/obstipation/constipation and urinary symptosm w/ retention. lower abd ttp, no leg swelling  Will order abdominal labs, CT to eval for possible obstruction, and ua/ucx for possible bladder infection. Will also need armenta placed as pt has urinary retention

## 2022-07-16 NOTE — ED PROVIDER NOTE - PHYSICAL EXAMINATION
Physical Exam:  Gen: awake alert   HEENT:  normal conjunctiva, oral mucosa dry  Lung: CTAB, no respiratory distress, no wheezes/rhonchi/rales B/L, speaking in full sentences  CV: RRR  Abd: soft, lower abd TTP with fullness, ND, no guarding, no rigidity, no rebound tenderness  MSK: no visible deformities  Skin: Warm, well perfused, no rash, no leg swelling  ~Emanuel Shaffer MD (PGY-3) Physical Exam:  Gen: awake alert   HEENT:  normal conjunctiva, oral mucosa dry  Lung: CTAB, no respiratory distress, no wheezes/rhonchi/rales B/L, speaking in full sentences  CV: RRR  Abd: soft, lower abd TTP with fullness, ND, no guarding, no rigidity, no rebound tenderness  Rectal: no internal lesions, no TTP of prostate  MSK: no visible deformities  Skin: Warm, well perfused, no rash, no leg swelling  ~Emanuel Shaffer MD (PGY-3)

## 2022-07-16 NOTE — ED ADULT TRIAGE NOTE - INTERNATIONAL TRAVEL
Chief Complaint   Patient presents with     Musculoskeletal Problem     Pt present to clinic today for a right wrist pain.  Initial Pulse 88   Temp 97.8  F (36.6  C) (Tympanic)   Resp 18   Wt 23.9 kg (52 lb 12.8 oz)  There is no height or weight on file to calculate BMI.  Medication Reconciliation: complete    Briana Bonilla LPN   No

## 2022-07-16 NOTE — ED ADULT NURSE NOTE - NSICDXPASTSURGICALHX_GEN_ALL_CORE_FT
PAST SURGICAL HISTORY:  AV fistula 2013/ left forearm    H/O colonoscopy     H/O ileostomy '    for 3 months. s/p Reversal    Kidney transplant recipient 2018  @ [facility] :  +  Hep C Donor    S/P anal fissurectomy and chemical denervation  with biopsy and fulguration of anal lesions, 2021    S/p nephrectomy left 9/15/2015   + Cancer    S/p nephrectomy right 12/10/2015   benign    S/P right knee arthroscopy     S/P TURP (transurethral resection of prostate) and Transplant Kidney Stent Replacemernt, 21

## 2022-07-16 NOTE — ED PROVIDER NOTE - INTERNATIONAL TRAVEL
No
POST-OP DIAGNOSIS:  Uterine atony 20-Oct-2020 10:42:50  Terra Rojas  S/P  section 20-Oct-2020 10:42:25  Terra Rojas

## 2022-07-16 NOTE — ED PROVIDER NOTE - OBJECTIVE STATEMENT
71M PMH HTN, DM-2, RCC (left renal cancer, right renal lesions) s/p bilateral nephrectomy (2015), HCV due to positive kidney donor s/p treatment, was on dialysis for 3 years before DDRT at [facility] in 2018, ureteral stricture of transplant kidney s/p ureteral stent, prostate CA s/p radiation therapy, recurrent Pseudomonal UTIs (hospitalized 6/21; 8/21), previous SBO s/p ileostomy w/ reversal 2017, BPH, UTI's with urinary retention 71M PMH HTN, DM-2, RCC (left renal cancer, right renal lesions) s/p bilateral nephrectomy (2015), HCV due to positive kidney donor s/p treatment, was on dialysis for 3 years before DDRT at [facility] in 2018, ureteral stricture of transplant kidney s/p ureteral stent, prostate CA s/p radiation therapy, recurrent Pseudomonal UTIs (hospitalized 6/21; 8/21), previous SBO s/p ileostomy w/ reversal 2017, BPH, UTI's with urinary retention p/w abd pain for 2 days. Last BM 2 days ago, passed small amount of flatus this morning. Endorses dysuria, urinary frequency, and difficulty emptying bladder. No f/c, chest pain, SOB, flank pain, leg swelling    bladder scan: large amt of urine in bladder

## 2022-07-16 NOTE — CONSULT NOTE ADULT - SUBJECTIVE AND OBJECTIVE BOX
Transplant Surgery - Consult Note  --------------------------------------------------------------  DDRT date: 2018    HPI:     70 y/o/M with a PMH of ESRD 2/2 bilateral nephrectomy from neoplasm, HTN, DM, BPH, hx if SBO s/o ileostomy w/ reversal in , Underwent HCV + DDRT (2018) Post op course was c/b CMV viremia, DGF, ATN/mild rejection (2018 treated with steroids) and hydronephrosis in 2018 requiring PCN placement (distal ureteral stricture), perinephric collection (drained). Has had multiple PCNs, converted to PCNU in 20, multiple exchanges and stent exchanges for ureteral stricture, was removed in 2022, ureteral stent was last exchanged 2021.     Other post op history:    -Re-admitted in 2019 with E coli bacteremia/urosepsis; treated w/ IV vanc/cefepime/zosyn.   -Also multiple admissions for pseudomonas UTI, and coag neg staph UTI, tx w/ abx  -Re-admitted in 2019 with severe CHELA 2/2 FK toxicity (level on admission was 39), +UA, was treated w/ cefepime.   -Re-admitted in 2019 w/ uptrending Cr on outpatient labs (3.4), biopsy showed 1B ACR, that was treated with pulse steroids.  -hx of prostate Ca () s/p radiation therapy   -s/p TURP on 21   -s/p excision and fulguration of anal condyloma on 10/2021 and 2022.  -Cr range this year has shiv 2.4-2.75  -Tested + Covid and had monoclonal Ab on 21    Now presented to ED w/ abdominal discomfort, urinary frequency but difficulty emptying bladder. Last BM was 2 days ago, passed small amount of flatus yesterday. He denies CP, SOB, HA, dizziness, fevers/chills, N/V.      Plan of care:  See Below    MEDICATIONS  (STANDING):  piperacillin/tazobactam IVPB... 3.375 Gram(s) IV Intermittent once    MEDICATIONS  (PRN):      PAST MEDICAL & SURGICAL HISTORY:  HTN (hypertension)      Type 2 diabetes mellitus  dx: &#x27;88      ESRD (end stage renal disease)  On Dialysis &#x27;  to 2018      Hepatitis C  In Donor Kidney: patient received treatment for Hep. 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 s/p Reversal      S/P right knee arthroscopy  &#x27; 90&#x27;s      Kidney transplant recipient  2018  @ [facility] :  +  Hep C Donor      H/O colonoscopy      S/P TURP (transurethral resection of prostate)  and Transplant Kidney Stent Replacemernt, 21      S/P anal fissurectomy  and chemical denervation  with biopsy and fulguration of anal lesions, 2021          Vital Signs Last 24 Hrs  T(C): 37 (2022 15:52), Max: 37.1 (2022 12:07)  T(F): 98.6 (2022 15:52), Max: 98.8 (2022 12:07)  HR: 65 (2022 15:52) (65 - 95)  BP: 141/74 (2022 15:52) (139/75 - 141/74)  BP(mean): --  RR: 20 (2022 15:52) (20 - 20)  SpO2: 100% (2022 15:52) (98% - 100%)    Parameters below as of 2022 15:52  Patient On (Oxygen Delivery Method): room air        I&O's Summary                            10.1   8.46  )-----------( 175      ( 2022 15:03 )             33.6     07-16    140  |  106  |  33<H>  ----------------------------<  121<H>  5.9<H>   |  23  |  2.91<H>    Ca    10.3      2022 15:03    TPro  7.0  /  Alb  3.8  /  TBili  0.2  /  DBili  x   /  AST  30  /  ALT  15  /  AlkPhos  80  07-        Gen: No weight changes, fatigue, fevers/chills, +weakness  Skin: No rashes  Head/Eyes/Ears/Mouth: No headache; Normal hearing; Normal vision w/o blurriness; No sinus pain/discomfort, sore throat  Respiratory: No dyspnea, cough, wheezing, hemoptysis  CV: No chest pain, PND, orthopnea  GI: No abdominal pain, diarrhea, nausea, vomiting, melena, hematochezia +constipation X2 days  : +increased frequency, +dysuria, No hematuria  MSK: No joint pain/swelling; no back pain; no edema  Neuro: No dizziness/lightheadedness, weakness, seizures, numbness, tingling  Heme: No easy bruising or bleeding  Endo: No heat/cold intolerance  Psych: No significant nervousness, anxiety, stress, depression  All other systems were reviewed and are negative, except as noted.      Constitutional: Well developed / well nourished  Eyes: Anicteric, PERRLA  ENMT: nc/at  Neck: Supple  Respiratory: CTA B/L  Cardiovascular: RRR  Gastrointestinal: Soft abdomen, NT, ND. Reducible ventral hernia.   Genitourinary:  Voiding spontaneously  Extremities: SCD's in place and working bilaterally  Vascular: Palpable dp pulses bilaterally  Neurological: A&O x3  Musculoskeletal: Moving all extremities  Psychiatric: Responsive   Transplant Surgery - Consult Note  --------------------------------------------------------------  DDRT date: 2018    HPI:     70 y/o/M with a PMH of ESRD 2/2 bilateral nephrectomy from neoplasm, HTN, DM, BPH, hx if SBO s/o ileostomy w/ reversal in , Underwent HCV + DDRT (2018) Post op course was c/b CMV viremia, DGF, ATN/mild rejection (2018 treated with steroids) and hydronephrosis in 2018 requiring PCN placement (distal ureteral stricture), perinephric collection (drained). Has had multiple PCNs, converted to PCNU in 20, multiple exchanges and stent exchanges for ureteral stricture, PCN was removed in 2022, ureteral stent was last exchanged 2021 by urology Dr. Phillip.    Other post op history:    -Re-admitted in 2019 with E coli bacteremia/urosepsis; treated w/ IV vanc/cefepime/zosyn.   -Also multiple admissions for pseudomonas UTI, and coag neg staph UTI, tx w/ abx  -Re-admitted in 2019 with severe CHELA 2/2 FK toxicity (level on admission was 39), +UA, was treated w/ cefepime.   -Re-admitted in 2019 w/ uptrending Cr on outpatient labs (3.4), biopsy showed 1B ACR, that was treated with pulse steroids.  -hx of prostate Ca () s/p radiation therapy   -s/p TURP on 21   -s/p excision and fulguration of anal condyloma on 10/2021 and 2022.  -Cr range this year has shiv 2.4-2.75  -Tested + Covid and had monoclonal Ab on 21    Now presented to ED w/ abdominal discomfort, urinary frequency but difficulty emptying bladder. Last BM was 2 days ago, passed small amount of flatus yesterday. He denies CP, SOB, HA, dizziness, fevers/chills, N/V.      Plan of care:  See Below    MEDICATIONS  (STANDING):  piperacillin/tazobactam IVPB... 3.375 Gram(s) IV Intermittent once    MEDICATIONS  (PRN):      PAST MEDICAL & SURGICAL HISTORY:  HTN (hypertension)      Type 2 diabetes mellitus  dx: &#x27;88      ESRD (end stage renal disease)  On Dialysis &#x27;  to 2018      Hepatitis C  In Donor Kidney: patient received treatment for Hep. 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 s/p Reversal      S/P right knee arthroscopy  &#x27; 90&#x27;s      Kidney transplant recipient  2018  @ [facility] :  +  Hep C Donor      H/O colonoscopy      S/P TURP (transurethral resection of prostate)  and Transplant Kidney Stent Replacemernt, 21      S/P anal fissurectomy  and chemical denervation  with biopsy and fulguration of anal lesions, 2021          Vital Signs Last 24 Hrs  T(C): 37 (2022 15:52), Max: 37.1 (2022 12:07)  T(F): 98.6 (2022 15:52), Max: 98.8 (2022 12:07)  HR: 65 (2022 15:52) (65 - 95)  BP: 141/74 (2022 15:52) (139/75 - 141/74)  BP(mean): --  RR: 20 (2022 15:52) (20 - 20)  SpO2: 100% (2022 15:52) (98% - 100%)    Parameters below as of 2022 15:52  Patient On (Oxygen Delivery Method): room air        I&O's Summary                            10.1   8.46  )-----------( 175      ( 2022 15:03 )             33.6     07-16    140  |  106  |  33<H>  ----------------------------<  121<H>  5.9<H>   |  23  |  2.91<H>    Ca    10.3      2022 15:03    TPro  7.0  /  Alb  3.8  /  TBili  0.2  /  DBili  x   /  AST  30  /  ALT  15  /  AlkPhos  80  07-16        Gen: No weight changes, fatigue, fevers/chills, +weakness  Skin: No rashes  Head/Eyes/Ears/Mouth: No headache; Normal hearing; Normal vision w/o blurriness; No sinus pain/discomfort, sore throat  Respiratory: No dyspnea, cough, wheezing, hemoptysis  CV: No chest pain, PND, orthopnea  GI: No abdominal pain, diarrhea, nausea, vomiting, melena, hematochezia +constipation X2 days  : +increased frequency, +dysuria, No hematuria  MSK: No joint pain/swelling; no back pain; no edema  Neuro: No dizziness/lightheadedness, weakness, seizures, numbness, tingling  Heme: No easy bruising or bleeding  Endo: No heat/cold intolerance  Psych: No significant nervousness, anxiety, stress, depression  All other systems were reviewed and are negative, except as noted.      Constitutional: Well developed / well nourished  Eyes: Anicteric, PERRLA  ENMT: nc/at  Neck: Supple  Respiratory: CTA B/L  Cardiovascular: RRR  Gastrointestinal: Soft abdomen, NT, ND. Reducible ventral hernia.   Genitourinary:  Voiding spontaneously  Extremities: SCD's in place and working bilaterally  Vascular: Palpable dp pulses bilaterally  Neurological: A&O x3  Musculoskeletal: Moving all extremities  Psychiatric: Responsive

## 2022-07-16 NOTE — CONSULT NOTE ADULT - ASSESSMENT
68 y/o/M with a PMH of ESRD 2/2 bilateral nephrectomy from neoplasm, HTN, DM, BPH, hx if SBO s/o ileostomy w/ reversal in 2017, Underwent HCV + DDRT (7/2018) Post op course was c/b CMV viremia, DGF, ATN/mild rejection (8/2018 treated with steroids) and hydronephrosis in 9/2018 requiring PCN placement (distal ureteral stricture), perinephric collection (drained). Has had multiple PCNs, converted to PCNU in 2/27/20, multiple exchanges and stent exchanges for ureteral stricture, was removed in 1/2022, ureteral stent was last exchanged 11/8/2021 by urology (Dr. Phillip).    In ED: Vitals are stable, WBC normal, Cr 2.9 (baseline this year 2.5-2.75), K 5.9. UA+ leuks, Bladder scan showed large amount of urine, armenta was placed, initially w/ 1L UOP. Was given lokelma and 500cc LR bolus. Was given a dose of zosyn for +UA. CT was neg for SBO, showed cystitis, moderate hydro despite ureteral stent being in place. Patient had soft BM in ED after drinking PO contrast.     -Recommend admission to medicine service  -Recommend urology consult to assess need for stent exchange  -Obtain Renal US  -UA+; please send urine and blood cultures  -Repeat BMP to check K  -Continue abx, rec ID consult  -consult transplant nephrology  -Immunosuppression: Continue daily AM Envarsus 5, Cellcept 250 BID, Prednisone 5.  -Please send daily AM CBC w/ diff, CMP, Mag, phos  -Please check daily tacrolimus levels. Send tacrolimus level before giving the morning Envarsus dose.   -strict I/Os  -Transplant surgery will continue to follow. Please page [phone] w/ any questions or concerns.  -Discussed plan w/ Dr. Nicole.

## 2022-07-16 NOTE — ED PROVIDER NOTE - CARE PLAN
1 Principal Discharge DX:	Acute hyperkalemia  Secondary Diagnosis:	Acute kidney injury superimposed on CKD

## 2022-07-16 NOTE — CONSULT NOTE ADULT - ASSESSMENT
71y Male PMH of ESRD 2/2 bilateral nephrectomy from neoplasm, HTN, DM, BPH, hx if SBO s/o ileostomy w/ reversal in 2017, Underwent HCV + DDRT (7/2018) Post op course was c/b CMV viremia, DGF, ATN/mild rejection (8/2018 treated with steroids) and hydronephrosis in 9/2018 requiring PCN placement (distal ureteral stricture), perinephric collection (drained). Has had multiple PCNs, converted to PCNU in 2/27/20, multiple exchanges and stent exchanges for ureteral stricture, PCN was removed in 1/2022, ureteral stent was last exchanged 11/8/2021 by urology Dr. Phillip. Now presenting to ED with urinary retention. Armenta was placed in the ED, putting out 1200cc. CT showed moderate hydro to transplant kidney, cystitis and air in the bladder/transplant kidney from instrumentation vs. infection.  -no emergent urologic intervention warranted overnight  -trend Cr  -keep armenta  -possible non-urgent stent exchange during this admission  -f/u cultures  -formal recs pending d/w attending   71y Male PMH of ESRD 2/2 bilateral nephrectomy from neoplasm, HTN, DM, BPH, hx if SBO s/o ileostomy w/ reversal in 2017, Underwent HCV + DDRT (7/2018) Post op course was c/b CMV viremia, DGF, ATN/mild rejection (8/2018 treated with steroids) and hydronephrosis in 9/2018 requiring PCN placement (distal ureteral stricture), perinephric collection (drained). Has had multiple PCNs, converted to PCNU in 2/27/20, multiple exchanges and stent exchanges for ureteral stricture, PCN was removed in 1/2022, ureteral stent was last exchanged 11/8/2021 by urology Dr. Phillip. Now presenting to ED with urinary retention. Armenta was placed in the ED, putting out 1200cc. CT showed moderate hydro to transplant kidney, cystitis and air in the bladder/transplant kidney from instrumentation vs. infection.  -no emergent urologic intervention warranted overnight  -trend Cr  -keep armenta  -possible non-urgent stent exchange during this admission  -f/u cultures  -d/w Dr. Salmeron

## 2022-07-16 NOTE — ED PROVIDER NOTE - PROGRESS NOTE DETAILS
Edmund KING (PGY-3)  spoke to transplant team who will see pt in ED. requesting doppler of transplanted kidney Attending MD Acuña: hyperK 5.9, ECG without hyperK changes, will place on cardiac monitor. K should come down nicely with armenta placement (already gone) and lokelma Edmund KING (PGY-3)  rpt bmp just sent. transplant requesting uro c/s for possible stent exchange. transpalnt requesitng medicine admission and their team will follow along. I spoke with uro who states pt will not likely have an emergent stent exchanged. I spoke with dr. enriquez who will accept admission. u/s transplanted kidney still pending

## 2022-07-16 NOTE — H&P ADULT - ASSESSMENT
# Acute Pyelonephritis CT showed moderate hydro to transplant kidney, cystitis and air in the bladder/transplant kidney from instrumentation vs. infection.     IV Zosyn Follow up Urine Blood cx   Follow up ID consult     # Ureteral stricture Transplant [facility] Urology consult apprecaited   Plan for non urgent stent exchange    Velez for now       # Kidney Transplant Transplant team consult appreciated   Continue with Tacrolimus, Cell cept   Monitor Tacro levels

## 2022-07-16 NOTE — ED PROVIDER NOTE - ATTENDING CONTRIBUTION TO CARE
Attending MD Acuña:  I personally have seen and examined this patient. I have performed a substantive portion of the visit including all aspects of the medical decision making.  Resident note reviewed and agree on plan of care and except where noted.      71M PMH HTN, DM-2, RCC (left renal cancer, right renal lesions) s/p bilateral nephrectomy (2015), HCV due to positive kidney donor s/p treatment, was on dialysis for 3 years before DDRT at [facility] in 2018, ureteral stricture of transplant kidney s/p ureteral stent, prostate CA s/p radiation therapy, recurrent Pseudomonal UTIs (hospitalized 6/21; 8/21), previous SBO s/p ileostomy w/ reversal 2017, BPH, UTI's with urinary retention p/w abd pain for 2 days. Patient on exam with mild abd distention, reducible right ventral hernia, mild ttp at times in LLQ however distractible. PVR 800cc's, will place armenta to decompress. Ddx includes urinary retention, SBO, colitis, severe constipation, UTI/pyelo. Plan for labs, CT a/p, UA, Ucx close reassessment.         *The above represents an initial assessment/impression. Please refer to progress notes for potential changes in patient clinical course*

## 2022-07-16 NOTE — H&P ADULT - HISTORY OF PRESENT ILLNESS
71 M with pmhx htn, DM 2, RCC (left renal cancer, right renal lesions) s/p bilateral nephrectomy (2015), HCV due to positive kidney donor s/p treatment, was on dialysis for 3 years before DDRT at [facility] in 2018, ureteral stricture of transplant kidney s/p ureteral stent, prostate CA s/p radiation therapy, recurrent Pseudomonal UTIs (hospitalized 6/21; 8/21), previous SBO s/p ileostomy w/ reversal 2017, BPH, UTI's with urinary retention p/w abd pain for 2 days. Last BM 2 days ago, passed small amount of flatus this morning. Endorses dysuria, urinary frequency, and difficulty emptying bladder. No f/c, chest pain, SOB, flank pain, leg swelling    CT showed moderate hydro to transplant kidney, cystitis and air in the bladder/transplant kidney from instrumentation vs. infection.

## 2022-07-16 NOTE — H&P ADULT - NEUROLOGICAL
cranial nerves II-XII intact/sensation intact/responds to pain/responds to verbal commands/deep reflexes intact

## 2022-07-16 NOTE — ED PROVIDER NOTE - NS ED ROS FT
CONST: no fevers, no chills  ENT: no sore throat  CV: no chest pain, no leg swelling  RESP: no shortness of breath, no cough  ABD: +abdominal pain, +nausea, no vomiting, no diarrhea  : +dysuria, +urinary frequency, no flank pain, no hematuria  MSK: no back pain, no extremity pain  SKIN:  no rash

## 2022-07-16 NOTE — CONSULT NOTE ADULT - SUBJECTIVE AND OBJECTIVE BOX
HPI:  Patient is a 71y Male who presented with    PAST MEDICAL & SURGICAL HISTORY:  HTN (hypertension)      Type 2 diabetes mellitus  dx: &#x27;88      ESRD (end stage renal disease)  On Dialysis &#x27;  to 2018      Hepatitis C  In Donor Kidney: patient received treatment for Hep. 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 s/p Reversal      S/P right knee arthroscopy  &#x27; 90&#x27;s      Kidney transplant recipient  2018  @ [facility] :  +  Hep C Donor      H/O colonoscopy      S/P TURP (transurethral resection of prostate)  and Transplant Kidney Stent Replacemernt, 21      S/P anal fissurectomy  and chemical denervation  with biopsy and fulguration of anal lesions, 2021        FAMILY HISTORY:  FH: breast cancer (Mother)    FH: type 2 diabetes (Father)    FH: heart attack (Sibling)  age 54 at death      SOCIAL HISTORY:   Tobacco hx:  MEDICATIONS  (STANDING):    MEDICATIONS  (PRN):    Allergies    No Known Allergies    Intolerances        REVIEW OF SYSTEMS: Pertinent positives and negatives as stated in HPI, otherwise negative    Vital signs  T(C): 37 (22 @ 15:52), Max: 37.1 (22 @ 12:07)  HR: 65 (22 @ 15:52)  BP: 141/74 (22 @ 15:52)  SpO2: 100% (22 @ 15:52)  Wt(kg): --    Output      Physical Exam  Gen: NAD  Pulm: No respiratory distress, no subcostal retractions  CV: RRR, no JVD  Abd: Soft, NT, ND  : Uncircumcised/Circumcised, no lesions.  No discharge or blood at urethral meatus.  Testes descended bilaterally.  Testes and epididymis nontender bilaterally.  Cremasteric reflex present bilaterally.  MSK: No edema present    LABS:         @ 19:03    WBC --    / Hct --    / SCr 2.68      @ 15:03    WBC 8.46  / Hct 33.6  / SCr 2.91         139  |  107  |  29<H>  ----------------------------<  92  5.3   |  21<L>  |  2.68<H>    Ca    9.9      2022 19:03    TPro  7.0  /  Alb  3.8  /  TBili  0.2  /  DBili  x   /  AST  30  /  ALT  15  /  AlkPhos  80      PT/INR - ( 2022 15:03 )   PT: 11.4 sec;   INR: 0.99 ratio         PTT - ( 2022 15:03 )  PTT:31.3 sec  Urinalysis Basic - ( 2022 16:00 )    Color: Yellow / Appearance: Slightly Turbid / S.018 / pH: x  Gluc: x / Ketone: Negative  / Bili: Negative / Urobili: Negative   Blood: x / Protein: 100 mg/dL / Nitrite: Negative   Leuk Esterase: Large / RBC: 165 /hpf /  /HPF   Sq Epi: x / Non Sq Epi: 0 /hpf / Bacteria: Negative        Urine Cx:   Blood Cx:    Radiology:       HPI:  Patient is a 71y Male PMH of ESRD 2/2 bilateral nephrectomy from neoplasm, HTN, DM, BPH, hx if SBO s/o ileostomy w/ reversal in , Underwent HCV + DDRT (2018) Post op course was c/b CMV viremia, DGF, ATN/mild rejection (2018 treated with steroids) and hydronephrosis in 2018 requiring PCN placement (distal ureteral stricture), perinephric collection (drained). Has had multiple PCNs, converted to PCNU in 20, multiple exchanges and stent exchanges for ureteral stricture, PCN was removed in 2022, ureteral stent was last exchanged 2021 by urology Dr. Phillip.  Now presented to ED w/ abdominal discomfort, urinary frequency but difficulty emptying bladder. States he has been having dribbling x 1 week, that became severe with very small volume output over the past 2 days. He otherwise denies fevers/chills, N/V, dysuria, hematuria, flank pain. Also complaining of constipation.  Armenta was placed in the ED, putting out 1200cc.  CT showed moderate hydro to transplant kidney, cystitis and air in the bladder/transplant kidney from instrumentation vs. infection.  Labs showed WBC 8.5, Cr 2.91.    Other post op history:    -Re-admitted in 2019 with E coli bacteremia/urosepsis; treated w/ IV vanc/cefepime/zosyn.   -Also multiple admissions for pseudomonas UTI, and coag neg staph UTI, tx w/ abx  -Re-admitted in 2019 with severe CHELA 2/2 FK toxicity (level on admission was 39), +UA, was treated w/ cefepime.   -Re-admitted in 2019 w/ uptrending Cr on outpatient labs (3.4), biopsy showed 1B ACR, that was treated with pulse steroids.  -hx of prostate Ca () s/p radiation therapy   -s/p TURP on 21   -s/p excision and fulguration of anal condyloma on 10/2021 and 2022.  -Cr range this year has shiv 2.4-2.75  -Tested + Covid and had monoclonal Ab on 21        PAST MEDICAL & SURGICAL HISTORY:  HTN (hypertension)      Type 2 diabetes mellitus  dx: &#x27;88      ESRD (end stage renal disease)  On Dialysis &#x27;  to 2018      Hepatitis C  In Donor Kidney: patient received treatment for Hep. 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 s/p Reversal      S/P right knee arthroscopy  &#x27; 90&#x27;s      Kidney transplant recipient  2018  @ [facility] :  +  Hep C Donor      H/O colonoscopy      S/P TURP (transurethral resection of prostate)  and Transplant Kidney Stent Replacemernt, 21      S/P anal fissurectomy  and chemical denervation  with biopsy and fulguration of anal lesions, 2021        FAMILY HISTORY:  FH: breast cancer (Mother)    FH: type 2 diabetes (Father)    FH: heart attack (Sibling)  age 54 at death      SOCIAL HISTORY:   Tobacco hx:  MEDICATIONS  (STANDING):    MEDICATIONS  (PRN):    Allergies    No Known Allergies    Intolerances        REVIEW OF SYSTEMS: Pertinent positives and negatives as stated in HPI, otherwise negative    Vital signs  T(C): 37 (22 @ 15:52), Max: 37.1 (22 @ 12:07)  HR: 65 (22 @ 15:52)  BP: 141/74 (22 @ 15:52)  SpO2: 100% (22 @ 15:52)  Wt(kg): --    Output      Physical Exam  Gen: NAD  Pulm: No respiratory distress, no subcostal retractions  Abd: Soft, NT, ND  : +armenta in place draining turbid rand urine with sediment  MSK: No edema present    LABS:         @ 19:03    WBC --    / Hct --    / SCr 2.68      @ 15:03    WBC 8.46  / Hct 33.6  / SCr 2.91         139  |  107  |  29<H>  ----------------------------<  92  5.3   |  21<L>  |  2.68<H>    Ca    9.9      2022 19:03    TPro  7.0  /  Alb  3.8  /  TBili  0.2  /  DBili  x   /  AST  30  /  ALT  15  /  AlkPhos  80      PT/INR - ( 2022 15:03 )   PT: 11.4 sec;   INR: 0.99 ratio         PTT - ( 2022 15:03 )  PTT:31.3 sec  Urinalysis Basic - ( 2022 16:00 )    Color: Yellow / Appearance: Slightly Turbid / S.018 / pH: x  Gluc: x / Ketone: Negative  / Bili: Negative / Urobili: Negative   Blood: x / Protein: 100 mg/dL / Nitrite: Negative   Leuk Esterase: Large / RBC: 165 /hpf /  /HPF   Sq Epi: x / Non Sq Epi: 0 /hpf / Bacteria: Negative        Urine Cx: pending  Blood Cx: pending      Radiology:    ACC: [id number] EXAM:  CT ABDOMEN AND PELVIS OC                          PROCEDURE DATE:  2022          INTERPRETATION:  CLINICAL INFORMATION: Abdominal pain, bloating. History   of small bowel obstruction    COMPARISON: 2021    CONTRAST/COMPLICATIONS:  IV Contrast: NONE  Oral Contrast: Smoothie Readi-Cat 2  Complications: None reported at time of study completion    PROCEDURE:  CT of the Abdomen and Pelvis was performed.  Sagittal and coronal reformats were performed.    FINDINGS:  LOWER CHEST: Chronic mild reticular abnormality at the lung bases.    LIVER: Within normal limits.  BILE DUCTS: Normal caliber.  GALLBLADDER: Tiny gallstone.  SPLEEN: Within normal limits.  PANCREAS: Within normal limits.  ADRENALS: Within normal limits.  KIDNEYS/URETERS: Absent native kidneys. Right lower quadrant transplant   kidney demonstrating moderate hydronephrosis despite presence of a   ureteral stent. Large amount of air within the urinary tract likely   related to instrumentation/reflux.Mild periureteral fluid    BLADDER: Decompressed around a Armenta catheter. Intraluminal air likely   related to instrumentation. Diffuse wall thickening and adjacent   pericystic inflammation, suspect cystitis.  REPRODUCTIVE ORGANS: Mildly enlarged prostate.    BOWEL: Right hemicolectomy. No bowel obstruction. Loops of nonobstructed   small bowel extend into a small right paramidline ventral hernia.   Periampullary duodenal diverticulum.  PERITONEUM: Small amount of right lower quadrant fluid.  VESSELS: Vascular calcifications.  RETROPERITONEUM/LYMPH NODES: No lymphadenopathy.  ABDOMINAL WALL: Right paramidline ventral hernia containing a   nonobstructed loop of small bowel. Tiny left paramidline fat-containing   midline hernia. Small fat-containing inguinal hernias.  BONES: Degenerative changes of the spine.    IMPRESSION:  1.  No bowel obstruction.  2.  Cystitis. Note air within the bladder and right lower quadrant   transplant urinary tract probably related to instrumentation/reflux.   Possibility of with gas-forming organism should be excluded clinically.  3.  Moderate hydronephrosis of the right lower quadrant transplant kidney   despite presence of a ureteral stent.            --- End of Report ---            CHRISTOPHER ESCOBAR; Attending Radiologist  This document has been electronically signed. 2022  5:53PM

## 2022-07-16 NOTE — CONSULT NOTE ADULT - NS ATTEND AMEND GEN_ALL_CORE FT
s/p prior DDRT with chronic ureteral stricture and CKD  +prostate CA  patient admitted with UTI  has retained ureteral stent for several months  cont abx for uti  immunosuppression - resume home dose envarsus. Hold cellcept due to infection  Urology for ureteral stent exchange

## 2022-07-17 LAB
GLUCOSE BLDC GLUCOMTR-MCNC: 116 MG/DL — HIGH (ref 70–99)
GLUCOSE BLDC GLUCOMTR-MCNC: 133 MG/DL — HIGH (ref 70–99)
GLUCOSE BLDC GLUCOMTR-MCNC: 151 MG/DL — HIGH (ref 70–99)
GLUCOSE BLDC GLUCOMTR-MCNC: 84 MG/DL — SIGNIFICANT CHANGE UP (ref 70–99)
TACROLIMUS SERPL-MCNC: 2.5 NG/ML — SIGNIFICANT CHANGE UP

## 2022-07-17 PROCEDURE — 99223 1ST HOSP IP/OBS HIGH 75: CPT

## 2022-07-17 PROCEDURE — 76776 US EXAM K TRANSPL W/DOPPLER: CPT | Mod: 26,RT

## 2022-07-17 RX ORDER — TACROLIMUS 5 MG/1
1 CAPSULE ORAL DAILY
Refills: 0 | Status: DISCONTINUED | OUTPATIENT
Start: 2022-07-17 | End: 2022-07-21

## 2022-07-17 RX ORDER — VALGANCICLOVIR 450 MG/1
450 TABLET, FILM COATED ORAL DAILY
Refills: 0 | Status: DISCONTINUED | OUTPATIENT
Start: 2022-07-17 | End: 2022-07-18

## 2022-07-17 RX ORDER — DEXTROSE 50 % IN WATER 50 %
25 SYRINGE (ML) INTRAVENOUS ONCE
Refills: 0 | Status: DISCONTINUED | OUTPATIENT
Start: 2022-07-17 | End: 2022-07-21

## 2022-07-17 RX ORDER — FAMOTIDINE 10 MG/ML
1 INJECTION INTRAVENOUS
Qty: 0 | Refills: 0 | DISCHARGE

## 2022-07-17 RX ORDER — INSULIN LISPRO 100/ML
VIAL (ML) SUBCUTANEOUS
Refills: 0 | Status: DISCONTINUED | OUTPATIENT
Start: 2022-07-17 | End: 2022-07-21

## 2022-07-17 RX ORDER — GLIMEPIRIDE 1 MG
1 TABLET ORAL
Qty: 0 | Refills: 0 | DISCHARGE

## 2022-07-17 RX ORDER — DEXTROSE 50 % IN WATER 50 %
15 SYRINGE (ML) INTRAVENOUS ONCE
Refills: 0 | Status: DISCONTINUED | OUTPATIENT
Start: 2022-07-17 | End: 2022-07-21

## 2022-07-17 RX ORDER — DEXTROSE 50 % IN WATER 50 %
12.5 SYRINGE (ML) INTRAVENOUS ONCE
Refills: 0 | Status: DISCONTINUED | OUTPATIENT
Start: 2022-07-17 | End: 2022-07-21

## 2022-07-17 RX ORDER — PIPERACILLIN AND TAZOBACTAM 4; .5 G/20ML; G/20ML
3.38 INJECTION, POWDER, LYOPHILIZED, FOR SOLUTION INTRAVENOUS EVERY 8 HOURS
Refills: 0 | Status: DISCONTINUED | OUTPATIENT
Start: 2022-07-17 | End: 2022-07-20

## 2022-07-17 RX ORDER — TAMSULOSIN HYDROCHLORIDE 0.4 MG/1
0.4 CAPSULE ORAL AT BEDTIME
Refills: 0 | Status: DISCONTINUED | OUTPATIENT
Start: 2022-07-17 | End: 2022-07-21

## 2022-07-17 RX ORDER — ATENOLOL 25 MG/1
25 TABLET ORAL DAILY
Refills: 0 | Status: DISCONTINUED | OUTPATIENT
Start: 2022-07-17 | End: 2022-07-21

## 2022-07-17 RX ORDER — INSULIN LISPRO 100/ML
VIAL (ML) SUBCUTANEOUS AT BEDTIME
Refills: 0 | Status: DISCONTINUED | OUTPATIENT
Start: 2022-07-17 | End: 2022-07-21

## 2022-07-17 RX ORDER — NIFEDIPINE 30 MG
30 TABLET, EXTENDED RELEASE 24 HR ORAL DAILY
Refills: 0 | Status: DISCONTINUED | OUTPATIENT
Start: 2022-07-17 | End: 2022-07-21

## 2022-07-17 RX ORDER — GLUCAGON INJECTION, SOLUTION 0.5 MG/.1ML
1 INJECTION, SOLUTION SUBCUTANEOUS ONCE
Refills: 0 | Status: DISCONTINUED | OUTPATIENT
Start: 2022-07-17 | End: 2022-07-21

## 2022-07-17 RX ORDER — SODIUM CHLORIDE 9 MG/ML
1000 INJECTION, SOLUTION INTRAVENOUS
Refills: 0 | Status: DISCONTINUED | OUTPATIENT
Start: 2022-07-17 | End: 2022-07-21

## 2022-07-17 RX ORDER — TACROLIMUS 5 MG/1
4 CAPSULE ORAL DAILY
Refills: 0 | Status: DISCONTINUED | OUTPATIENT
Start: 2022-07-17 | End: 2022-07-21

## 2022-07-17 RX ORDER — MYCOPHENOLATE MOFETIL 250 MG/1
250 CAPSULE ORAL
Refills: 0 | Status: DISCONTINUED | OUTPATIENT
Start: 2022-07-17 | End: 2022-07-21

## 2022-07-17 RX ORDER — NIFEDIPINE 30 MG
1 TABLET, EXTENDED RELEASE 24 HR ORAL
Qty: 0 | Refills: 0 | DISCHARGE

## 2022-07-17 RX ADMIN — MYCOPHENOLATE MOFETIL 250 MILLIGRAM(S): 250 CAPSULE ORAL at 22:05

## 2022-07-17 RX ADMIN — PIPERACILLIN AND TAZOBACTAM 25 GRAM(S): 4; .5 INJECTION, POWDER, LYOPHILIZED, FOR SOLUTION INTRAVENOUS at 01:45

## 2022-07-17 RX ADMIN — VALGANCICLOVIR 450 MILLIGRAM(S): 450 TABLET, FILM COATED ORAL at 14:36

## 2022-07-17 RX ADMIN — Medication 30 MILLIGRAM(S): at 08:12

## 2022-07-17 RX ADMIN — TACROLIMUS 4 MILLIGRAM(S): 5 CAPSULE ORAL at 09:31

## 2022-07-17 RX ADMIN — TAMSULOSIN HYDROCHLORIDE 0.4 MILLIGRAM(S): 0.4 CAPSULE ORAL at 22:04

## 2022-07-17 RX ADMIN — Medication 5 MILLIGRAM(S): at 08:12

## 2022-07-17 RX ADMIN — TACROLIMUS 1 MILLIGRAM(S): 5 CAPSULE ORAL at 09:31

## 2022-07-17 RX ADMIN — MYCOPHENOLATE MOFETIL 250 MILLIGRAM(S): 250 CAPSULE ORAL at 08:12

## 2022-07-17 RX ADMIN — ATENOLOL 25 MILLIGRAM(S): 25 TABLET ORAL at 08:12

## 2022-07-17 RX ADMIN — PIPERACILLIN AND TAZOBACTAM 25 GRAM(S): 4; .5 INJECTION, POWDER, LYOPHILIZED, FOR SOLUTION INTRAVENOUS at 09:38

## 2022-07-17 RX ADMIN — PIPERACILLIN AND TAZOBACTAM 25 GRAM(S): 4; .5 INJECTION, POWDER, LYOPHILIZED, FOR SOLUTION INTRAVENOUS at 17:08

## 2022-07-17 NOTE — CONSULT NOTE ADULT - SUBJECTIVE AND OBJECTIVE BOX
ID CONSULTATION-- [place] [phone]    Patient is a 71y old  Male who presents with a chief complaint of Abdominal pain x 2 days (2022 12:08)    HPI:  71 M with pmhx htn, DM 2, RCC (left renal cancer, right renal lesions) s/p bilateral nephrectomy (), HCV due to positive kidney donor s/p treatment, was on dialysis for 3 years before DDRT at [facility] in 2018, ureteral stricture of transplant kidney s/p ureteral stent, prostate CA s/p radiation therapy, recurrent Pseudomonal UTIs (hospitalized ; ), previous SBO s/p ileostomy w/ reversal , BPH, UTI's with urinary retention p/w abd pain for 2 days. Last BM 2 days ago, passed small amount of flatus this morning. Endorses dysuria, urinary frequency, and difficulty emptying bladder. No f/c, chest pain, SOB, flank pain, leg swelling    CT showed moderate hydro to transplant kidney, cystitis and air in the bladder/transplant kidney from instrumentation vs. infection.   (2022 23:11)      PAST MEDICAL & SURGICAL HISTORY:  HTN (hypertension)      Type 2 diabetes mellitus  dx: &#x27;88      ESRD (end stage renal disease)  On Dialysis &#x27;  to 2018      Hepatitis C  In Donor Kidney: patient received treatment for Hep. 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 s/p Reversal      S/P right knee arthroscopy  &#x27; 90&#x27;s      Kidney transplant recipient  2018  @ [facility] :  +  Hep C Donor      H/O colonoscopy      S/P TURP (transurethral resection of prostate)  and Transplant Kidney Stent Replacemernt, 21      S/P anal fissurectomy  and chemical denervation  with biopsy and fulguration of anal lesions, 2021          SOCIAL:    FAMILY HISTORY:  FH: breast cancer (Mother)    FH: type 2 diabetes (Father)    FH: heart attack (Sibling)  age 54 at death      REVIEW OF SYSTEMS  General:	Denies any malaise fatigue or chills. Fevers absent    Skin:No rash  	  Ophthalmologic:Denies any visual complaints,discharge redness or photophobia  	  ENMT:No nasal discharge,headache,sinus congestion or throat pain.No dental complaints    Respiratory and Thorax:No cough,sputum or chest pain.Denies shortness of breath  	  Cardiovascular:	No chest pain,palpitaions or dizziness    Gastrointestinal:	NO nausea,abdominal pain or diarrhea.    Genitourinary:	No dysuria,frequency. No flank pain    Musculoskeletal:	No joint swelling or pain.No weakness    Neurological:No confusion,diziness.No extremity weakness.No bladder or bowel incontinence	    Psychiatric:No delusions or hallucinations	    Hematology/Lymphatics:	No LN swelling.No gum bleeding     Endocrine:	No recent weight gain or loss.No abnormal heat/cold intolerance    Allergic/Immunologic:	No hives or rash   Allergies    No Known Allergies    Intolerances        ANTIMICROBIALS:    piperacillin/tazobactam IVPB.. 3.375 Gram(s) IV Intermittent every 8 hours  valGANciclovir 450 milliGRAM(s) Oral daily      Vital Signs Last 24 Hrs  T(C): 37.1 (2022 15:12), Max: 37.1 (2022 15:12)  T(F): 98.8 (2022 15:12), Max: 98.8 (2022 15:12)  HR: 70 (2022 15:12) (61 - 70)  BP: 119/63 (2022 15:12) (107/71 - 159/75)  BP(mean): 103 (2022 20:27) (103 - 103)  RR: 18 (2022 15:12) (18 - 20)  SpO2: 98% (2022 15:12) (98% - 100%)    Parameters below as of 2022 15:12  Patient On (Oxygen Delivery Method): room air        PHYSICAL EXAM:Pleasant patient in no acute distress.      Constitutional:Comfortable.Awake and alert  No cachexia     Eyes:PERRL EOMI.NO discharge or conjunctival injection    ENMT:No sinus tenderness.No thrush.No pharyngeal exudate or erythema.Fair dental hygiene    Neck:Supple,No LN,no JVD      Respiratory:Good air entry bilaterally,CTA    Cardiovascular:S1 S2 wnl, No murmurs,rub or gallops    Gastrointestinal:Soft BS(+) no tenderness no masses ,No rebound or guarding    Genitourinary:No CVA tendereness     Rectal:    Extremities:No cyanosis,clubbing or edema.    Vascular:peripheral pulses felt    Neurological:AAO X 3,No grossly focal deficits    Skin:No rash     Lymph Nodes:No palpable LNs    Musculoskeletal:No joint swelling or LOM    Psychiatric:Affect normal.                              10.1   8.46  )-----------( 175      ( 2022 15:03 )             33.6       07-    139  |  107  |  29<H>  ----------------------------<  92  5.3   |  21<L>  |  2.68<H>    Ca    9.9      2022 19:03    TPro  7.0  /  Alb  3.8  /  TBili  0.2  /  DBili  x   /  AST  30  /  ALT  15  /  AlkPhos  80  07-16      RECENT CULTURES:      RADIOLOGY:  < from: US Trans Kidney w/ Doppler, Right (22 @ 11:44) >  IMPRESSION:    Mild hydronephrosis, minimally increased from prior exam. Ureteral stent   in situ.  Urothelial and bladder wall thickening.  Elevated resistive indices, mildly decreased from prior.  No evidence of significant renal artery stenosis.    < end of copied text >      IMPRESSION:    Rx:   ID CONSULTATION-- [place] [phone]    Patient is a 71y old  Male who presents with a chief complaint of Abdominal pain x 2 days (2022 12:08)    HPI:  71 M with pmhx htn, DM 2, RCC (left renal cancer, right renal lesions) s/p bilateral nephrectomy (), HCV due to positive kidney donor s/p treatment, was on dialysis for 3 years before DDRT at [facility] in 2018, ureteral stricture of transplant kidney s/p ureteral stent, prostate CA s/p radiation therapy, recurrent Pseudomonal UTIs (hospitalized ; ), previous SBO s/p ileostomy w/ reversal , BPH, UTI's with urinary retention p/w abd pain for 2 days. Last BM 2 days ago, passed small amount of flatus this morning. Endorses dysuria, urinary frequency, and difficulty emptying bladder. No f/c, chest pain, SOB, flank pain, leg swelling    CT showed moderate hydro to transplant kidney, cystitis and air in the bladder/transplant kidney from instrumentation vs. infection.   (2022 23:11)    Social hx:  lives with wife  pet turtles (he does not touch )    PAST MEDICAL & SURGICAL HISTORY:  HTN (hypertension)      Type 2 diabetes mellitus  dx: &#x27;88      ESRD (end stage renal disease)  On Dialysis &#x27;  to 2018      Hepatitis C  In Donor Kidney: patient received treatment for Hep. 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 s/p Reversal      S/P right knee arthroscopy  &#x27; 90&#x27;s      Kidney transplant recipient  2018  @ [facility] :  +  Hep C Donor      H/O colonoscopy      S/P TURP (transurethral resection of prostate)  and Transplant Kidney Stent Replacemernt, 21      S/P anal fissurectomy  and chemical denervation  with biopsy and fulguration of anal lesions, 2021          SOCIAL:    FAMILY HISTORY:  FH: breast cancer (Mother)    FH: type 2 diabetes (Father)    FH: heart attack (Sibling)  age 54 at death      REVIEW OF SYSTEMS  General:	Denies any malaise fatigue or chills. Fevers absent    Skin:No rash  	  Ophthalmologic:Denies any visual complaints,discharge redness or photophobia  	  ENMT:No nasal discharge,headache,sinus congestion or throat pain.No dental complaints    Respiratory and Thorax:No cough,sputum or chest pain.Denies shortness of breath  	  Cardiovascular:	No chest pain,palpitaions or dizziness    Gastrointestinal:	NO nausea,abdominal pain or diarrhea.    Genitourinary: noted dribbling incontinence prior to admission and armenta placement    Musculoskeletal:	No joint swelling or pain.No weakness    Neurological:No confusion,diziness.No extremity weakness.No bladder or bowel incontinence	    Psychiatric:No delusions or hallucinations	    Hematology/Lymphatics:	No LN swelling.No gum bleeding     Endocrine:	No recent weight gain or loss.No abnormal heat/cold intolerance    Allergic/Immunologic:	No hives or rash   Allergies    No Known Allergies    Intolerances        ANTIMICROBIALS:    piperacillin/tazobactam IVPB.. 3.375 Gram(s) IV Intermittent every 8 hours  valGANciclovir 450 milliGRAM(s) Oral daily      Vital Signs Last 24 Hrs  T(C): 37.1 (2022 15:12), Max: 37.1 (2022 15:12)  T(F): 98.8 (2022 15:12), Max: 98.8 (2022 15:12)  HR: 70 (2022 15:12) (61 - 70)  BP: 119/63 (2022 15:12) (107/71 - 159/75)  BP(mean): 103 (2022 20:27) (103 - 103)  RR: 18 (2022 15:12) (18 - 20)  SpO2: 98% (2022 15:12) (98% - 100%)    Parameters below as of 2022 15:12  Patient On (Oxygen Delivery Method): room air        PHYSICAL EXAM:Pleasant patient in no acute distress.  good historian      Constitutional:Comfortable.Awake and alert  No cachexia     Eyes:PERRL EOMI.NO discharge or conjunctival injection    ENMT:No sinus tenderness.No thrush.No pharyngeal exudate or erythema.Fair dental hygiene    Neck:Supple,No LN,no JVD submandibular adenopathy noted      Respiratory:Good air entry bilaterally,CTA    Cardiovascular:S1 S2 wnl, No murmurs,rub or gallops    Gastrointestinal:Soft BS(+) no tenderness no masses ,No rebound or guarding  extensive scars  vertical midline patient had ileostomy and reversal  RLQ transplant kidney site yogi rythema or tenderness    Genitourinary:No CVA tendereness , armenta in place  uncircumcised    Rectal:    Extremities:No cyanosis,clubbing or edema.    Vascular:peripheral pulses felt    Neurological:AAO X 3,No grossly focal deficits    Skin:No rash     Lymph Nodes:No palpable LNs    Musculoskeletal:No joint swelling or LOM    Psychiatric:Affect normal.                              10.1   8.46  )-----------( 175      ( 2022 15:03 )             33.6       07-16    139  |  107  |  29<H>  ----------------------------<  92  5.3   |  21<L>  |  2.68<H>    Ca    9.9      2022 19:03    TPro  7.0  /  Alb  3.8  /  TBili  0.2  /  DBili  x   /  AST  30  /  ALT  15  /  AlkPhos  80  -16      RECENT CULTURES:      RADIOLOGY:  < from: US Trans Kidney w/ Doppler, Right (22 @ 11:44) >  IMPRESSION:    Mild hydronephrosis, minimally increased from prior exam. Ureteral stent   in situ.  Urothelial and bladder wall thickening.  Elevated resistive indices, mildly decreased from prior.  No evidence of significant renal artery stenosis.    < end of copied text >      IMPRESSION:    Rx:

## 2022-07-17 NOTE — CONSULT NOTE ADULT - ASSESSMENT
70 y/o man s/p DDRT in 7/2018 with chronic graft dysfunction with baseline creatinine of 2.7-3mg/dL, history of transplant ureteric stricture with stent exchanges last in 11/2021, h/o BPH s/p TURP, Prostate cancer completed RT in 8/2021,  admitted with urine retention, armenta placed, U/A +ve, started on zosyn.       1. Urine retention - s/p armenta placement. Pt with BPH s/p TURP 11/2021 and Prostate cancer s/p androgen depletion and RT. Last PSA was 0.03 in Jan 2022    - Continue Zosyn,  F/u urine culture. Keep Armenta, continue Flomax.  Urology follow up.     2. Transplant ureteric stricture - last stent exchange in 11/2021 - urology follow up for exchange.     3. S/p DDRT in 2018. Giovanny creatinine 1.9 but recently 2.7-3mg/dL. Graft function relatively stable    Immunosuppression   - Continue Envarsus 5mg po daily, Cellcept 250mg po bid, Prednisone 5mg daily.   - Check tacrolimus trough, 30 mins prior to AM dose. Tacrolimus trough goal 5-7     4. HTN - on Nifedipine 30mg po daily  and atenolol 25mg po daily at home. Hold Nifedipine as BP is soft.     7. Diabetes - on Januvia and Glimepiride at home. Start FS QAC and HS with admelog coverage.

## 2022-07-17 NOTE — PROVIDER CONTACT NOTE (OTHER) - ASSESSMENT
A&Ox4, VSS, pain on penis likely d/t Velez.  Velez started leaking.  After flushing the Velez, no clots noted - Patient was slotted for a bed on 2[facility]

## 2022-07-17 NOTE — CONSULT NOTE ADULT - ASSESSMENT
70 y/o man s/p DDRT in 7/2018 with chronic graft dysfunction with baseline creatinine of 2.7-3mg/dL, history of transplant ureteric stricture with stent exchanges last in 11/2021, h/o BPH s/p TURP, Prostate cancer completed RT in 8/2021,  admitted with urine retention, armetna placed, U/A +ve, started on zosyn.   He has prior episodes of post transplant related UTIs/pyelonephritis with cultures showing Pseudomonas (zosyn sen), E.cloacae (Zosyn sen)and more recently E.faecium (Vanc sensitive)      1. 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 Pt with BPH s/p TURP 11/2021 and Prostate cancer s/p androgen depletion and RT. Last PSA was 0.03 in Jan 2022    - send blood cultures x 2 sets on 7/16   - sent UA and culture results pending  - continue Zosyn as prior GNR organisms sensitive   - more recently with E.faecium sensitive to Vancomycin: as he is afebrile can hold off on empiric therapy pending cultures    2. Transplant ureteric stricture - last stent exchange in 11/2021 - urology follow up for exchange.     3. S/p DDRT in 2018. Giovanny creatinine 1.9 but recently 2.7-3mg/dL. Graft function relatively stable    Immunosuppression - would ask Transplant Nephrology to reduce immunosuppression in the setting of acute infection      Nate Bay MD  Can be called via Teams  After 5pm/weekends [phone]      70 y/o man s/p DDRT in 7/2018 with chronic graft dysfunction with baseline creatinine of 2.7-3mg/dL, history of transplant ureteric stricture with stent exchanges last in 11/2021, h/o BPH s/p TURP, Prostate cancer completed RT in 8/2021,  admitted with urine retention, armenta placed, U/A +ve, started on zosyn.   He has prior episodes of post transplant related UTIs/pyelonephritis with cultures showing Pseudomonas (zosyn sen), E.cloacae (Zosyn sen)and more recently E.faecium (Vanc sensitive)      1. 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 Pt with BPH s/p TURP 11/2021 and Prostate cancer s/p androgen depletion and RT. Last PSA was 0.03 in Jan 2022    - send blood cultures x 2 sets on 7/16   - sent UA and culture results pending  - continue Zosyn as prior GNR organisms sensitive   - more recently with E.faecium sensitive to Vancomycin: as he is afebrile can hold off on empiric Vancomycin therapy for this organism pending cultures    2. Transplant ureteric stricture - last stent exchange in 11/2021 - urology follow up for exchange.     3. S/p DDRT in 2018. Giovanny creatinine 1.9 but recently 2.7-3mg/dL. Graft function relatively stable    Immunosuppression - would ask Transplant Nephrology to reduce immunosuppression in the setting of acute infection      Nate Bay MD  Can be called via Teams  After 5pm/weekends [phone]

## 2022-07-17 NOTE — CONSULT NOTE ADULT - CONSULT REASON
hydro in transplant kidney w/ hx of stent
Urinary Retention/constipation
Kidney transplant recipient
post renal transplant with UTI/pyelonephritis

## 2022-07-17 NOTE — CONSULT NOTE ADULT - SUBJECTIVE AND OBJECTIVE BOX
[facility] DIVISION OF KIDNEY DISEASES AND HYPERTENSION -- INITIAL CONSULT NOTE  --------------------------------------------------------------------------------  Authored by: Star Barrera   Cell # [phone]       HPI: 71 year old man with ESRD due to bilateral nephrectomy for RCC in . He received Hep C+ DDRT in 2018 with Simulect induction. Course complicated by DGF, mild rejection treated with steroids, distal ureteral stricture requiring multiple procedures PCN, NU, ureteral dilations , currently gets  stent exchanges last in 2021 by Dr. Phillip. Baseline creatinine ~ 2.7    Other PMH includes HTN, DM type II, Prostate cancer s/p Orgovyx and radiation therapy completed 2021 PSA down to 0.03 in 2022., BPH s/p TURP in 2021,  SBO s/p ileostomy with reversal in . Ho CMV viremia, recurrent UTIs, constipation, anal fissure.     He is admitted with urinary dribbling for the past few days and has been unable to void for the past 2 days, with continuous urinary incontinence. He had no fever, no graft pain, no hematuria, no NVD. Has constipation.   Velez was placed in the ED, putting out 1200cc. CT showed moderate hydro to transplant kidney, cystitis and air in the bladder/transplant kidney from instrumentation vs. infection.  Labs showed WBC 8.5, Cr 2.91.      PAST HISTORY  --------------------------------------------------------------------------------  PAST MEDICAL & SURGICAL HISTORY:  HTN (hypertension)  Type 2 diabetes mellitus  ESRD (end stage renal disease)  Benign prostatic hypertrophy  Anal fissure  Bowel obstruction surgically treated w/ ileostomy; since reversed  Medial meniscus tear Right  Renal cell carcinoma s/p b/l nephrectomy and renal transplant in 2018  Prostate cancer s/p androgen depletion rx and radiation rx completed 2021   Ureteral stricture of kidney transplant 2018  COVID-19 virus infection 2021, asymptomatic  Bilateral cataracts  Anemia secondary to renal failure    AV fistula 2013/ left forearm  S/p nephrectomy left 9/15/2015   + Cancer  S/p nephrectomy right 12/10/2015   benign  H/O ileostomy for 3 months. s/p Reversal  S/P right knee arthroscopy  Kidney transplant recipient 2018  @ [facility] :  +  Hep C Donor  H/O colonoscopy  S/P TURP (transurethral resection of prostate) and Transplant Kidney Stent Replacemernt, 21  S/P anal fissurectomy and chemical denervation  with biopsy and fulguration of anal lesions, 2021      FAMILY HISTORY:  FH: breast cancer (Mother)  FH: type 2 diabetes (Father)  FH: heart attack (Sibling)  age 54 at death      Social History: no smoking or alcohol use       ALLERGIES & MEDICATIONS  --------------------------------------------------------------------------------  Allergies  No Known Allergies        Standing Inpatient Medications  ATENolol  Tablet 25 milliGRAM(s) Oral daily  mycophenolate mofetil 250 milliGRAM(s) Oral two times a day  NIFEdipine XL 30 milliGRAM(s) Oral daily  piperacillin/tazobactam IVPB.. 3.375 Gram(s) IV Intermittent every 8 hours  predniSONE   Tablet 5 milliGRAM(s) Oral daily  tacrolimus ER Tablet (ENVARSUS XR) 1 milliGRAM(s) Oral daily  tacrolimus ER Tablet (ENVARSUS XR) 4 milliGRAM(s) Oral daily  tamsulosin 0.4 milliGRAM(s) Oral at bedtime  valGANciclovir 450 milliGRAM(s) Oral daily          REVIEW OF SYSTEMS  --------------------------------------------------------------------------------  All other ROS negative         VITALS/PHYSICAL EXAM  --------------------------------------------------------------------------------  T(C): 36.4 (22 05:32), Max: 37.1 (22 @ 12:07)  HR: 64 (22 07:55) (61 - 95)  BP: 109/77 (22 07:55) (107/71 - 159/75)  RR: 18 (22 05:32) (18 - 20)  SpO2: 99% (22 05:32) (98% - 100%)    Height (cm): 177.8 (22 23:11)  Weight (kg): 95.3 (22 @ 23:11)  BMI (kg/m2): 30.1 (22 @ 23:11)  BSA (m2): 2.13 (22 23:11)      Physical Exam:  Awake and alert  Resting comfortably in stretcher   Lungs clear  Regular heart sounds  Abdomen soft, no graft tenderness  No edema   LUE AV fistula   Alert and oriented       LABS/STUDIES  --------------------------------------------------------------------------------              10.1   8.46  >-----------<  175      [22 @ 15:03]              33.6     139  |  107  |  29  ----------------------------<  92      [22 @ 19:03]  5.3   |  21  |  2.68        Ca     9.9     [22 19:03]    TPro  7.0  /  Alb  3.8  /  TBili  0.2  /  DBili  x   /  AST  30  /  ALT  15  /  AlkPhos  80  [22 @ 15:03]    PT/INR: PT 11.4 , INR 0.99       [22 15:03]  PTT: 31.3       [22 15:03]    Creatinine Trend:  SCr 2.68 [:]  SCr 2.91 [ 15:03]    Urinalysis - [22 @ 16:00]      Color Yellow / Appearance Slightly Turbid / SG 1.018 / pH 6.0      Gluc Negative / Ketone Negative  / Bili Negative / Urobili Negative       Blood Large / Protein 100 mg/dL / Leuk Est Large / Nitrite Negative       /  / Hyaline 2 / Gran  / Sq Epi  / Non Sq Epi 0 / Bacteria Negative      HbA1c 6.0      [19 @ 23:48]    HBsAb <3.0      [19 @ 22:44]  HBsAg Nonreact      [19 @ 22:44]  HBcAb Nonreact      [19 @ 22:44]  HCV 0.07, Nonreact      [19 @ 22:44]  HIV Nonreact      [08-15-18 @ 14:37]

## 2022-07-17 NOTE — PROGRESS NOTE ADULT - SUBJECTIVE AND OBJECTIVE BOX
Surgery Progress Note     Subjective/24hour Events:   Patient seen and examined. Doing well. Velez in place. Reports....urinary symptoms  Admitted to medicine overnight, found to be in retention with hydro of transplant kidney with stent in place (placed Nov, 2021).    Vital Signs:  Vital Signs Last 24 Hrs  T(C): 36.4 (17 Jul 2022 05:32), Max: 37 (16 Jul 2022 15:52)  T(F): 97.5 (17 Jul 2022 05:32), Max: 98.6 (16 Jul 2022 15:52)  HR: 64 (17 Jul 2022 07:55) (61 - 68)  BP: 109/77 (17 Jul 2022 07:55) (107/71 - 159/75)  BP(mean): 103 (16 Jul 2022 20:27) (103 - 103)  RR: 18 (17 Jul 2022 05:32) (18 - 20)  SpO2: 99% (17 Jul 2022 05:32) (98% - 100%)    Parameters below as of 17 Jul 2022 05:32  Patient On (Oxygen Delivery Method): room air        CAPILLARY BLOOD GLUCOSE      POCT Blood Glucose.: 84 mg/dL (17 Jul 2022 09:50)      I&O's Detail      MEDICATIONS  (STANDING):  ATENolol  Tablet 25 milliGRAM(s) Oral daily  dextrose 5%. 1000 milliLiter(s) (50 mL/Hr) IV Continuous <Continuous>  dextrose 5%. 1000 milliLiter(s) (100 mL/Hr) IV Continuous <Continuous>  dextrose 50% Injectable 25 Gram(s) IV Push once  dextrose 50% Injectable 12.5 Gram(s) IV Push once  dextrose 50% Injectable 25 Gram(s) IV Push once  glucagon  Injectable 1 milliGRAM(s) IntraMuscular once  insulin lispro (ADMELOG) corrective regimen sliding scale   SubCutaneous three times a day before meals  insulin lispro (ADMELOG) corrective regimen sliding scale   SubCutaneous at bedtime  mycophenolate mofetil 250 milliGRAM(s) Oral two times a day  NIFEdipine XL 30 milliGRAM(s) Oral daily  piperacillin/tazobactam IVPB.. 3.375 Gram(s) IV Intermittent every 8 hours  predniSONE   Tablet 5 milliGRAM(s) Oral daily  tacrolimus ER Tablet (ENVARSUS XR) 1 milliGRAM(s) Oral daily  tacrolimus ER Tablet (ENVARSUS XR) 4 milliGRAM(s) Oral daily  tamsulosin 0.4 milliGRAM(s) Oral at bedtime  valGANciclovir 450 milliGRAM(s) Oral daily    MEDICATIONS  (PRN):  dextrose Oral Gel 15 Gram(s) Oral once PRN Blood Glucose LESS THAN 70 milliGRAM(s)/deciliter      Physical Exam:  Gen: NAD.  Lungs: Non labored breathing.   Ab: Soft, nontender, nondistended. Incisions healing  : Velez in place draining well    Labs:    07-16    139  |  107  |  29<H>  ----------------------------<  92  5.3   |  21<L>  |  2.68<H>    Ca    9.9      16 Jul 2022 19:03    TPro  7.0  /  Alb  3.8  /  TBili  0.2  /  DBili  x   /  AST  30  /  ALT  15  /  AlkPhos  80  07-16    LIVER FUNCTIONS - ( 16 Jul 2022 15:03 )  Alb: 3.8 g/dL / Pro: 7.0 g/dL / ALK PHOS: 80 U/L / ALT: 15 U/L / AST: 30 U/L / GGT: x                                 10.1   8.46  )-----------( 175      ( 16 Jul 2022 15:03 )             33.6     PT/INR - ( 16 Jul 2022 15:03 )   PT: 11.4 sec;   INR: 0.99 ratio         PTT - ( 16 Jul 2022 15:03 )  PTT:31.3 sec     Surgery Progress Note     Subjective/24hour Events:   Patient seen and examined. Doing well. Velez in place. Reports that prior to his admission to the hospital, he was unable to void, had no sensation of the bladder to void, was leaking and incontinent likely in overflow.   Admitted to medicine overnight, found to be in retention with hydro of transplant kidney with stent in place (placed Nov, 2021).     Vital Signs:  Vital Signs Last 24 Hrs  T(C): 36.4 (17 Jul 2022 05:32), Max: 37 (16 Jul 2022 15:52)  T(F): 97.5 (17 Jul 2022 05:32), Max: 98.6 (16 Jul 2022 15:52)  HR: 64 (17 Jul 2022 07:55) (61 - 68)  BP: 109/77 (17 Jul 2022 07:55) (107/71 - 159/75)  BP(mean): 103 (16 Jul 2022 20:27) (103 - 103)  RR: 18 (17 Jul 2022 05:32) (18 - 20)  SpO2: 99% (17 Jul 2022 05:32) (98% - 100%)    Parameters below as of 17 Jul 2022 05:32  Patient On (Oxygen Delivery Method): room air        CAPILLARY BLOOD GLUCOSE      POCT Blood Glucose.: 84 mg/dL (17 Jul 2022 09:50)      I&O's Detail      MEDICATIONS  (STANDING):  ATENolol  Tablet 25 milliGRAM(s) Oral daily  dextrose 5%. 1000 milliLiter(s) (50 mL/Hr) IV Continuous <Continuous>  dextrose 5%. 1000 milliLiter(s) (100 mL/Hr) IV Continuous <Continuous>  dextrose 50% Injectable 25 Gram(s) IV Push once  dextrose 50% Injectable 12.5 Gram(s) IV Push once  dextrose 50% Injectable 25 Gram(s) IV Push once  glucagon  Injectable 1 milliGRAM(s) IntraMuscular once  insulin lispro (ADMELOG) corrective regimen sliding scale   SubCutaneous three times a day before meals  insulin lispro (ADMELOG) corrective regimen sliding scale   SubCutaneous at bedtime  mycophenolate mofetil 250 milliGRAM(s) Oral two times a day  NIFEdipine XL 30 milliGRAM(s) Oral daily  piperacillin/tazobactam IVPB.. 3.375 Gram(s) IV Intermittent every 8 hours  predniSONE   Tablet 5 milliGRAM(s) Oral daily  tacrolimus ER Tablet (ENVARSUS XR) 1 milliGRAM(s) Oral daily  tacrolimus ER Tablet (ENVARSUS XR) 4 milliGRAM(s) Oral daily  tamsulosin 0.4 milliGRAM(s) Oral at bedtime  valGANciclovir 450 milliGRAM(s) Oral daily    MEDICATIONS  (PRN):  dextrose Oral Gel 15 Gram(s) Oral once PRN Blood Glucose LESS THAN 70 milliGRAM(s)/deciliter      Physical Exam:  Gen: NAD.  Lungs: Non labored breathing.   Ab: Soft, nontender, nondistended. Incisions healing  : Velez in place draining well    Labs:    07-16    139  |  107  |  29<H>  ----------------------------<  92  5.3   |  21<L>  |  2.68<H>    Ca    9.9      16 Jul 2022 19:03    TPro  7.0  /  Alb  3.8  /  TBili  0.2  /  DBili  x   /  AST  30  /  ALT  15  /  AlkPhos  80  07-16    LIVER FUNCTIONS - ( 16 Jul 2022 15:03 )  Alb: 3.8 g/dL / Pro: 7.0 g/dL / ALK PHOS: 80 U/L / ALT: 15 U/L / AST: 30 U/L / GGT: x                                 10.1   8.46  )-----------( 175      ( 16 Jul 2022 15:03 )             33.6     PT/INR - ( 16 Jul 2022 15:03 )   PT: 11.4 sec;   INR: 0.99 ratio         PTT - ( 16 Jul 2022 15:03 )  PTT:31.3 sec

## 2022-07-17 NOTE — PROGRESS NOTE ADULT - SUBJECTIVE AND OBJECTIVE BOX
Patient is a 71y old  Male who presents with a chief complaint of Abdominal pain x 2 days (2022 15:31)      SUBJECTIVE / OVERNIGHT EVENTS:     MEDICATIONS  (STANDING):  ATENolol  Tablet 25 milliGRAM(s) Oral daily  dextrose 5%. 1000 milliLiter(s) (50 mL/Hr) IV Continuous <Continuous>  dextrose 5%. 1000 milliLiter(s) (100 mL/Hr) IV Continuous <Continuous>  dextrose 50% Injectable 25 Gram(s) IV Push once  dextrose 50% Injectable 12.5 Gram(s) IV Push once  dextrose 50% Injectable 25 Gram(s) IV Push once  glucagon  Injectable 1 milliGRAM(s) IntraMuscular once  insulin lispro (ADMELOG) corrective regimen sliding scale   SubCutaneous three times a day before meals  insulin lispro (ADMELOG) corrective regimen sliding scale   SubCutaneous at bedtime  mycophenolate mofetil 250 milliGRAM(s) Oral two times a day  NIFEdipine XL 30 milliGRAM(s) Oral daily  piperacillin/tazobactam IVPB.. 3.375 Gram(s) IV Intermittent every 8 hours  predniSONE   Tablet 5 milliGRAM(s) Oral daily  tacrolimus ER Tablet (ENVARSUS XR) 1 milliGRAM(s) Oral daily  tacrolimus ER Tablet (ENVARSUS XR) 4 milliGRAM(s) Oral daily  tamsulosin 0.4 milliGRAM(s) Oral at bedtime  valGANciclovir 450 milliGRAM(s) Oral daily    MEDICATIONS  (PRN):  dextrose Oral Gel 15 Gram(s) Oral once PRN Blood Glucose LESS THAN 70 milliGRAM(s)/deciliter      CAPILLARY BLOOD GLUCOSE      POCT Blood Glucose.: 151 mg/dL (2022 17:36)  POCT Blood Glucose.: 133 mg/dL (2022 12:52)  POCT Blood Glucose.: 84 mg/dL (2022 09:50)    I&O's Summary    T(C): 36.3 (22 @ 16:17), Max: 37.1 (22 @ 15:12)  HR: 73 (22 @ 16:17) (70 - 73)  BP: 146/69 (22 @ 16:17) (119/63 - 146/69)  RR: 18 (22 @ 16:17) (18 - 18)  SpO2: 99% (-22 @ 16:17) (98% - 99%)    PHYSICAL EXAM:  GENERAL: NAD, well-developed  HEAD:  Atraumatic, Normocephalic  EYES: EOMI, PERRLA, conjunctiva and sclera clear  NECK: Supple, No JVD  CHEST/LUNG: Clear to auscultation bilaterally; No wheeze  HEART: Regular rate and rhythm; No murmurs, rubs, or gallops  ABDOMEN: Soft, Nontender, Nondistended; Bowel sounds present  EXTREMITIES:  2+ Peripheral Pulses, No clubbing, cyanosis, or edema  PSYCH: AAOx3  NEUROLOGY: non-focal  SKIN: No rashes or lesions    LABS:                        10.1   8.46  )-----------( 175      ( 2022 15:03 )             33.6     07-    139  |  107  |  29<H>  ----------------------------<  92  5.3   |  21<L>  |  2.68<H>    Ca    9.9      2022 19:03    TPro  7.0  /  Alb  3.8  /  TBili  0.2  /  DBili  x   /  AST  30  /  ALT  15  /  AlkPhos  80  07-16    PT/INR - ( 2022 15:03 )   PT: 11.4 sec;   INR: 0.99 ratio         PTT - ( 2022 15:03 )  PTT:31.3 sec      Urinalysis Basic - ( 2022 16:00 )    Color: Yellow / Appearance: Slightly Turbid / S.018 / pH: x  Gluc: x / Ketone: Negative  / Bili: Negative / Urobili: Negative   Blood: x / Protein: 100 mg/dL / Nitrite: Negative   Leuk Esterase: Large / RBC: 165 /hpf /  /HPF   Sq Epi: x / Non Sq Epi: 0 /hpf / Bacteria: Negative        RADIOLOGY & ADDITIONAL TESTS:    Imaging Personally Reviewed:    Consultant(s) Notes Reviewed:      Care Discussed with Consultants/Other Providers:   Patient is a 71y old  Male who presents with a chief complaint of Abdominal pain x 2 days (2022 15:31)      SUBJECTIVE / OVERNIGHT EVENTS: no events    MEDICATIONS  (STANDING):  ATENolol  Tablet 25 milliGRAM(s) Oral daily  dextrose 5%. 1000 milliLiter(s) (50 mL/Hr) IV Continuous <Continuous>  dextrose 5%. 1000 milliLiter(s) (100 mL/Hr) IV Continuous <Continuous>  dextrose 50% Injectable 25 Gram(s) IV Push once  dextrose 50% Injectable 12.5 Gram(s) IV Push once  dextrose 50% Injectable 25 Gram(s) IV Push once  glucagon  Injectable 1 milliGRAM(s) IntraMuscular once  insulin lispro (ADMELOG) corrective regimen sliding scale   SubCutaneous three times a day before meals  insulin lispro (ADMELOG) corrective regimen sliding scale   SubCutaneous at bedtime  mycophenolate mofetil 250 milliGRAM(s) Oral two times a day  NIFEdipine XL 30 milliGRAM(s) Oral daily  piperacillin/tazobactam IVPB.. 3.375 Gram(s) IV Intermittent every 8 hours  predniSONE   Tablet 5 milliGRAM(s) Oral daily  tacrolimus ER Tablet (ENVARSUS XR) 1 milliGRAM(s) Oral daily  tacrolimus ER Tablet (ENVARSUS XR) 4 milliGRAM(s) Oral daily  tamsulosin 0.4 milliGRAM(s) Oral at bedtime  valGANciclovir 450 milliGRAM(s) Oral daily    MEDICATIONS  (PRN):  dextrose Oral Gel 15 Gram(s) Oral once PRN Blood Glucose LESS THAN 70 milliGRAM(s)/deciliter      CAPILLARY BLOOD GLUCOSE      POCT Blood Glucose.: 151 mg/dL (2022 17:36)  POCT Blood Glucose.: 133 mg/dL (2022 12:52)  POCT Blood Glucose.: 84 mg/dL (2022 09:50)    I&O's Summary    T(C): 36.3 (22 @ 16:17), Max: 37.1 (22 @ 15:12)  HR: 73 (22 @ 16:17) (70 - 73)  BP: 146/69 (22 @ 16:17) (119/63 - 146/69)  RR: 18 (22 @ 16:17) (18 - 18)  SpO2: 99% (-22 @ 16:17) (98% - 99%)    PHYSICAL EXAM:  GENERAL: NAD, well-developed  HEAD:  Atraumatic, Normocephalic  EYES: EOMI, PERRLA, conjunctiva and sclera clear  NECK: Supple, No JVD  CHEST/LUNG: Clear to auscultation bilaterally; No wheeze  HEART: Regular rate and rhythm; No murmurs, rubs, or gallops  ABDOMEN: Soft, Nontender, Nondistended; Bowel sounds present  EXTREMITIES:  2+ Peripheral Pulses, No clubbing, cyanosis, or edema  PSYCH: AAOx3  NEUROLOGY: non-focal  SKIN: No rashes or lesions    LABS:                        10.1   8.46  )-----------( 175      ( 2022 15:03 )             33.6     07-    139  |  107  |  29<H>  ----------------------------<  92  5.3   |  21<L>  |  2.68<H>    Ca    9.9      2022 19:03    TPro  7.0  /  Alb  3.8  /  TBili  0.2  /  DBili  x   /  AST  30  /  ALT  15  /  AlkPhos  80  07-16    PT/INR - ( 2022 15:03 )   PT: 11.4 sec;   INR: 0.99 ratio         PTT - ( 2022 15:03 )  PTT:31.3 sec      Urinalysis Basic - ( 2022 16:00 )    Color: Yellow / Appearance: Slightly Turbid / S.018 / pH: x  Gluc: x / Ketone: Negative  / Bili: Negative / Urobili: Negative   Blood: x / Protein: 100 mg/dL / Nitrite: Negative   Leuk Esterase: Large / RBC: 165 /hpf /  /HPF   Sq Epi: x / Non Sq Epi: 0 /hpf / Bacteria: Negative        RADIOLOGY & ADDITIONAL TESTS:    Imaging Personally Reviewed:    Consultant(s) Notes Reviewed:      Care Discussed with Consultants/Other Providers:

## 2022-07-18 ENCOUNTER — RX RENEWAL (OUTPATIENT)
Age: 72
End: 2022-07-18

## 2022-07-18 ENCOUNTER — APPOINTMENT (OUTPATIENT)
Dept: COLORECTAL SURGERY | Facility: CLINIC | Age: 72
End: 2022-07-18

## 2022-07-18 DIAGNOSIS — Z94.0 KIDNEY TRANSPLANT STATUS: ICD-10-CM

## 2022-07-18 DIAGNOSIS — R33.9 RETENTION OF URINE, UNSPECIFIED: ICD-10-CM

## 2022-07-18 DIAGNOSIS — D84.9 IMMUNODEFICIENCY, UNSPECIFIED: ICD-10-CM

## 2022-07-18 LAB
ANION GAP SERPL CALC-SCNC: 12 MMOL/L — SIGNIFICANT CHANGE UP (ref 5–17)
BUN SERPL-MCNC: 33 MG/DL — HIGH (ref 7–23)
CALCIUM SERPL-MCNC: 9.7 MG/DL — SIGNIFICANT CHANGE UP (ref 8.4–10.5)
CHLORIDE SERPL-SCNC: 103 MMOL/L — SIGNIFICANT CHANGE UP (ref 96–108)
CO2 SERPL-SCNC: 20 MMOL/L — LOW (ref 22–31)
CREAT SERPL-MCNC: 2.55 MG/DL — HIGH (ref 0.5–1.3)
EGFR: 26 ML/MIN/1.73M2 — LOW
GLUCOSE BLDC GLUCOMTR-MCNC: 111 MG/DL — HIGH (ref 70–99)
GLUCOSE BLDC GLUCOMTR-MCNC: 125 MG/DL — HIGH (ref 70–99)
GLUCOSE BLDC GLUCOMTR-MCNC: 139 MG/DL — HIGH (ref 70–99)
GLUCOSE BLDC GLUCOMTR-MCNC: 164 MG/DL — HIGH (ref 70–99)
GLUCOSE SERPL-MCNC: 99 MG/DL — SIGNIFICANT CHANGE UP (ref 70–99)
HCT VFR BLD CALC: 36.3 % — LOW (ref 39–50)
HGB BLD-MCNC: 11.1 G/DL — LOW (ref 13–17)
MCHC RBC-ENTMCNC: 28.8 PG — SIGNIFICANT CHANGE UP (ref 27–34)
MCHC RBC-ENTMCNC: 30.6 GM/DL — LOW (ref 32–36)
MCV RBC AUTO: 94.3 FL — SIGNIFICANT CHANGE UP (ref 80–100)
NRBC # BLD: 0 /100 WBCS — SIGNIFICANT CHANGE UP (ref 0–0)
PLATELET # BLD AUTO: 161 K/UL — SIGNIFICANT CHANGE UP (ref 150–400)
POTASSIUM SERPL-MCNC: 6 MMOL/L — HIGH (ref 3.5–5.3)
POTASSIUM SERPL-SCNC: 6 MMOL/L — HIGH (ref 3.5–5.3)
RBC # BLD: 3.85 M/UL — LOW (ref 4.2–5.8)
RBC # FLD: 16.3 % — HIGH (ref 10.3–14.5)
SODIUM SERPL-SCNC: 135 MMOL/L — SIGNIFICANT CHANGE UP (ref 135–145)
WBC # BLD: 6.22 K/UL — SIGNIFICANT CHANGE UP (ref 3.8–10.5)
WBC # FLD AUTO: 6.22 K/UL — SIGNIFICANT CHANGE UP (ref 3.8–10.5)

## 2022-07-18 PROCEDURE — 99232 SBSQ HOSP IP/OBS MODERATE 35: CPT | Mod: GC

## 2022-07-18 RX ORDER — NIFEDIPINE 30 MG
1 TABLET, EXTENDED RELEASE 24 HR ORAL
Qty: 0 | Refills: 0 | DISCHARGE

## 2022-07-18 RX ORDER — PHENYLEPHRINE-SHARK LIVER OIL-MINERAL OIL-PETROLATUM RECTAL OINTMENT
1 OINTMENT (GRAM) RECTAL
Refills: 0 | Status: DISCONTINUED | OUTPATIENT
Start: 2022-07-18 | End: 2022-07-21

## 2022-07-18 RX ORDER — FAMOTIDINE 10 MG/ML
1 INJECTION INTRAVENOUS
Qty: 0 | Refills: 0 | DISCHARGE

## 2022-07-18 RX ORDER — SODIUM BICARBONATE 1 MEQ/ML
650 SYRINGE (ML) INTRAVENOUS
Refills: 0 | Status: DISCONTINUED | OUTPATIENT
Start: 2022-07-18 | End: 2022-07-21

## 2022-07-18 RX ORDER — CITRIC ACID/SODIUM CITRATE 300-500 MG
5 SOLUTION, ORAL ORAL
Qty: 0 | Refills: 0 | DISCHARGE

## 2022-07-18 RX ORDER — SODIUM ZIRCONIUM CYCLOSILICATE 10 G/10G
10 POWDER, FOR SUSPENSION ORAL ONCE
Refills: 0 | Status: COMPLETED | OUTPATIENT
Start: 2022-07-18 | End: 2022-07-18

## 2022-07-18 RX ORDER — VALGANCICLOVIR 450 MG/1
450 TABLET, FILM COATED ORAL
Refills: 0 | Status: DISCONTINUED | OUTPATIENT
Start: 2022-07-18 | End: 2022-07-21

## 2022-07-18 RX ORDER — ALLOPURINOL 300 MG
100 TABLET ORAL DAILY
Refills: 0 | Status: DISCONTINUED | OUTPATIENT
Start: 2022-07-18 | End: 2022-07-21

## 2022-07-18 RX ORDER — NIACIN 50 MG
1 TABLET ORAL
Qty: 0 | Refills: 0 | DISCHARGE

## 2022-07-18 RX ADMIN — PIPERACILLIN AND TAZOBACTAM 25 GRAM(S): 4; .5 INJECTION, POWDER, LYOPHILIZED, FOR SOLUTION INTRAVENOUS at 21:33

## 2022-07-18 RX ADMIN — PHENYLEPHRINE-SHARK LIVER OIL-MINERAL OIL-PETROLATUM RECTAL OINTMENT 1 APPLICATION(S): at 14:31

## 2022-07-18 RX ADMIN — TAMSULOSIN HYDROCHLORIDE 0.4 MILLIGRAM(S): 0.4 CAPSULE ORAL at 21:33

## 2022-07-18 RX ADMIN — Medication 5 MILLIGRAM(S): at 05:59

## 2022-07-18 RX ADMIN — PHENYLEPHRINE-SHARK LIVER OIL-MINERAL OIL-PETROLATUM RECTAL OINTMENT 1 APPLICATION(S): at 21:33

## 2022-07-18 RX ADMIN — PIPERACILLIN AND TAZOBACTAM 25 GRAM(S): 4; .5 INJECTION, POWDER, LYOPHILIZED, FOR SOLUTION INTRAVENOUS at 13:32

## 2022-07-18 RX ADMIN — MYCOPHENOLATE MOFETIL 250 MILLIGRAM(S): 250 CAPSULE ORAL at 17:05

## 2022-07-18 RX ADMIN — PIPERACILLIN AND TAZOBACTAM 25 GRAM(S): 4; .5 INJECTION, POWDER, LYOPHILIZED, FOR SOLUTION INTRAVENOUS at 01:11

## 2022-07-18 RX ADMIN — TACROLIMUS 1 MILLIGRAM(S): 5 CAPSULE ORAL at 11:24

## 2022-07-18 RX ADMIN — Medication 650 MILLIGRAM(S): at 17:05

## 2022-07-18 RX ADMIN — Medication 30 MILLIGRAM(S): at 05:59

## 2022-07-18 RX ADMIN — Medication 1: at 12:20

## 2022-07-18 RX ADMIN — TACROLIMUS 4 MILLIGRAM(S): 5 CAPSULE ORAL at 09:24

## 2022-07-18 RX ADMIN — SODIUM ZIRCONIUM CYCLOSILICATE 10 GRAM(S): 10 POWDER, FOR SUSPENSION ORAL at 09:24

## 2022-07-18 RX ADMIN — MYCOPHENOLATE MOFETIL 250 MILLIGRAM(S): 250 CAPSULE ORAL at 05:59

## 2022-07-18 RX ADMIN — ATENOLOL 25 MILLIGRAM(S): 25 TABLET ORAL at 05:59

## 2022-07-18 NOTE — PROGRESS NOTE ADULT - SUBJECTIVE AND OBJECTIVE BOX
Follow Up:  No complaints this morning. Denies any urinary symptoms, fever or chills.     Interval History/ROS:Patient is a 71y old  Male who presents with a chief complaint of Abdominal pain x 2 days (2022 20:43)      REVIEW OF SYSTEMS  Denied fevers, chills, night sweats, rash, cough, coryza, dysuria, loose stools    Allergies  No Known Allergies    ANTIMICROBIALS:    piperacillin/tazobactam IVPB.. 3.375 every 8 hours  valGANciclovir 450 daily    OTHER MEDS: MEDICATIONS  (STANDING):  ATENolol  Tablet 25 daily  dextrose 50% Injectable 25 once  dextrose 50% Injectable 12.5 once  dextrose 50% Injectable 25 once  dextrose Oral Gel 15 once PRN  glucagon  Injectable 1 once  insulin lispro (ADMELOG) corrective regimen sliding scale  three times a day before meals  insulin lispro (ADMELOG) corrective regimen sliding scale  at bedtime  mycophenolate mofetil 250 two times a day  NIFEdipine XL 30 daily  predniSONE   Tablet 5 daily  tacrolimus ER Tablet (ENVARSUS XR) 1 daily  tacrolimus ER Tablet (ENVARSUS XR) 4 daily  tamsulosin 0.4 at bedtime      Vital Signs Last 24 Hrs  T(F): 98.6 (22 @ 04:56), Max: 98.8 (22 @ 12:07)    Vital Signs Last 24 Hrs  HR: 60 (22 @ 04:56) (60 - 73)  BP: 167/66 (22 @ 04:56) (119/63 - 167/66)  RR: 18 (22 @ 04:56)  SpO2: 98% (22 @ 04:56) (98% - 99%)  Wt(kg): --    EXAM:  Physical Exam:  Constitutional:  well preserved, comfortable  Head/Eyes: no icterus, PERRL, EOMI  ENT:  supple; no thrush  LUNGS:  CTA  CVS:  normal S1, S2, no murmur  Abd:  soft, non-tender; non-distended, no CVA tenderness  : +Velez with yellow urine  Ext:  no edema  Vascular:  IV site no erythema tenderness or discharge  MSK:  joints without swelling  Neuro: AAO X 3, non- focal      Labs:                        11.1   6.22  )-----------( 161      ( 2022 08:34 )             36.3         135  |  103  |  33<H>  ----------------------------<  99  6.0<H>   |  20<L>  |  2.55<H>    Ca    9.7      2022 05:12    TPro  7.0  /  Alb  3.8  /  TBili  0.2  /  DBili  x   /  AST  30  /  ALT  15  /  AlkPhos  80        WBC Trend:  WBC Count: 6.22 (22 @ 08:34)  WBC Count: 8.46 (22 @ 15:03)      Creatine Trend:  Creatinine, Serum: 2.55 ()  Creatinine, Serum: 2.68 ()  Creatinine, Serum: 2.91 ()      Liver Biochemical Testing Trend:  Alanine Aminotransferase (ALT/SGPT): 15 ()  Alanine Aminotransferase (ALT/SGPT): 18 (10-22)  Alanine Aminotransferase (ALT/SGPT): 28 ()  Alanine Aminotransferase (ALT/SGPT): 12 ()  Alanine Aminotransferase (ALT/SGPT): 10 ()  Aspartate Aminotransferase (AST/SGOT): 30 (22 @ 15:03)  Aspartate Aminotransferase (AST/SGOT): 30 (10-22- @ 13:47)  Aspartate Aminotransferase (AST/SGOT): 28 (- @ 03:47)  Aspartate Aminotransferase (AST/SGOT): 20 (- @ 06:33)  Aspartate Aminotransferase (AST/SGOT): 15 (- @ 10:12)  Bilirubin Total, Serum: 0.2 ()  Bilirubin Total, Serum: 0.2 (10-22)  Bilirubin Total, Serum: 0.2 ()  Bilirubin Total, Serum: 0.2 ()  Bilirubin Total, Serum: 0.3 ()    Trend LDH    Urinalysis Basic - ( 2022 16:00 )    Color: Yellow / Appearance: Slightly Turbid / S.018 / pH: x  Gluc: x / Ketone: Negative  / Bili: Negative / Urobili: Negative   Blood: x / Protein: 100 mg/dL / Nitrite: Negative   Leuk Esterase: Large / RBC: 165 /hpf /  /HPF   Sq Epi: x / Non Sq Epi: 0 /hpf / Bacteria: Negative    MICROBIOLOGY:    Culture - Blood (collected 2022 18:30)  Source: .Blood Blood-Venous  Preliminary Report:    No growth to date.    Culture - Blood (collected 2022 18:15)  Source: .Blood Blood-Peripheral  Preliminary Report:    No growth to date.    Culture - Urine (collected 2022 16:00)  Source: Clean Catch Clean Catch (Midstream)  Preliminary Report:    Culture in progress    GI PCR Panel, Stool (collected 24 Aug 2021 13:27)  Source: .Stool Feces  Final Report:    GI PCR Results: NOT detected    *******Please Note:*******    GI panel PCR evaluates for:    Campylobacter, Plesiomonas shigelloides, Salmonella,    Vibrio, Yersinia enterocolitica, Enteroaggregative    Escherichia coli (EAEC), Enteropathogenic E.coli (EPEC),    Enterotoxigenic E. coli (ETEC) lt/st, Shiga-like    toxin-producing E. coli (STEC) stx1/stx2,    Shigella/ Enteroinvasive E. coli (EIEC), Cryptosporidium,    Cyclospora cayetanensis, Entamoeba histolytica,    Giardia lamblia, Adenovirus F 40/41, Astrovirus,    Norovirus GI/GII, Rotavirus A, Sapovirus    Culture - Blood (collected 21 Aug 2021 14:41)  Source: .Blood Blood  Final Report:    No Growth Final    Culture - Blood (collected 20 Aug 2021 20:55)  Source: .Blood Blood-Peripheral  Final Report:    No Growth Final    Culture - Urine (collected 20 Aug 2021 09:02)  Source: Clean Catch Clean Catch (Midstream)  Final Report:    >100,000 CFU/ml Pseudomonas aeruginosa  Organism: Pseudomonas aeruginosa  Organism: Pseudomonas aeruginosa    Sensitivities:      -  Amikacin: S <=16      -  Aztreonam: S 8      -  Cefepime: S 4      -  Ceftazidime: S 4      -  Ciprofloxacin: R >2      -  Gentamicin: S <=2      -  Imipenem: I 4      -  Levofloxacin: R >4      -  Meropenem: I 4      -  Piperacillin/Tazobactam: S <=8      -  Tobramycin: S <=2      Method Type: AUTUMN    GI PCR Panel, Stool (collected 02 Aug 2021 22:32)  Source: .Stool Feces  Final Report:    GI PCR Results: NOT detected    *******Please Note:*******    GI panel PCR evaluates for:    Campylobacter, Plesiomonas shigelloides, Salmonella,    Vibrio, Yersinia enterocolitica, Enteroaggregative    Escherichia coli (EAEC), Enteropathogenic E.coli (EPEC),    Enterotoxigenic E. coli (ETEC) lt/st, Shiga-like    toxin-producing E. coli (STEC) stx1/stx2,    Shigella/ Enteroinvasive E. coli (EIEC), Cryptosporidium,    Cyclospora cayetanensis, Entamoeba histolytica,    Giardia lamblia, Adenovirus F 40/41, Astrovirus,    Norovirus GI/GII, Rotavirus A, Sapovirus    Culture - Urine (collected 01 Aug 2021 09:37)  Source: Clean Catch Clean Catch (Midstream)  Final Report:    >100,000 CFU/ml Pseudomonas aeruginosa    Multiple Morphological Strains  Organism: Pseudomonas aeruginosa  Pseudomonas aeruginosa  Organism: Pseudomonas aeruginosa    Sensitivities:      -  Amikacin: S <=16      -  Aztreonam: I 16      -  Cefepime: S 4      -  Ceftazidime: S 4      -  Ciprofloxacin: R >2      -  Gentamicin: S <=2      -  Imipenem: S 2      -  Levofloxacin: R >4      -  Meropenem: S 2      -  Piperacillin/Tazobactam: S <=8      -  Tobramycin: S <=2      Method Type: AUTUMN  Organism: Pseudomonas aeruginosa    Sensitivities:      -  Amikacin: S <=16      -  Aztreonam: S 8      -  Cefepime: S 4      -  Ceftazidime: S 4      -  Ciprofloxacin: R >2      -  Gentamicin: S <=2      -  Imipenem: I 4      -  Levofloxacin: R >4      -  Meropenem: S <=1      -  Piperacillin/Tazobactam: S <=8      -  Tobramycin: S <=2      Method Type: AUTUMN    Culture - Blood (collected 2021 05:18)  Source: .Blood Blood-Venous  Final Report:    No Growth Final    HIV-1 RNA Quantitative, Viral Load:   NOT DET. (08-15-18 @ 14:37)  HIV-1 Viral Load Result: NOT DET. (08-15-18 @ 14:37)      COVID-19 [place] Domain AB Interp: Positive (21 @ 09:20)  COVID-19 [place] Domain AB Interp: Positive (21 @ 13:47)      Blood Gas Venous - Lactate: 1.6 ( @ 14:00)    RADIOLOGY:  imaging below personally reviewed    Xray Chest 1 View- PORTABLE-Urgent:  (2022 17:24)

## 2022-07-18 NOTE — PROGRESS NOTE ADULT - SUBJECTIVE AND OBJECTIVE BOX
Patient is a 71y old  Male who presents with a chief complaint of Abdominal pain x 2 days (18 Jul 2022 13:37)      SUBJECTIVE / OVERNIGHT EVENTS: no events   On armenta      MEDICATIONS  (STANDING):  allopurinol 100 milliGRAM(s) Oral daily  ATENolol  Tablet 25 milliGRAM(s) Oral daily  dextrose 5%. 1000 milliLiter(s) (50 mL/Hr) IV Continuous <Continuous>  dextrose 5%. 1000 milliLiter(s) (100 mL/Hr) IV Continuous <Continuous>  dextrose 50% Injectable 25 Gram(s) IV Push once  dextrose 50% Injectable 12.5 Gram(s) IV Push once  dextrose 50% Injectable 25 Gram(s) IV Push once  glucagon  Injectable 1 milliGRAM(s) IntraMuscular once  hemorrhoidal Ointment 1 Application(s) Rectal two times a day  insulin lispro (ADMELOG) corrective regimen sliding scale   SubCutaneous three times a day before meals  insulin lispro (ADMELOG) corrective regimen sliding scale   SubCutaneous at bedtime  mycophenolate mofetil 250 milliGRAM(s) Oral two times a day  NIFEdipine XL 30 milliGRAM(s) Oral daily  piperacillin/tazobactam IVPB.. 3.375 Gram(s) IV Intermittent every 8 hours  predniSONE   Tablet 5 milliGRAM(s) Oral daily  sodium bicarbonate 650 milliGRAM(s) Oral two times a day  tacrolimus ER Tablet (ENVARSUS XR) 1 milliGRAM(s) Oral daily  tacrolimus ER Tablet (ENVARSUS XR) 4 milliGRAM(s) Oral daily  tamsulosin 0.4 milliGRAM(s) Oral at bedtime  valGANciclovir 450 milliGRAM(s) Oral <User Schedule>    MEDICATIONS  (PRN):  dextrose Oral Gel 15 Gram(s) Oral once PRN Blood Glucose LESS THAN 70 milliGRAM(s)/deciliter      CAPILLARY BLOOD GLUCOSE      POCT Blood Glucose.: 139 mg/dL (18 Jul 2022 21:55)  POCT Blood Glucose.: 125 mg/dL (18 Jul 2022 16:56)  POCT Blood Glucose.: 164 mg/dL (18 Jul 2022 12:12)  POCT Blood Glucose.: 111 mg/dL (18 Jul 2022 07:53)    I&O's Summary    17 Jul 2022 07:01  -  18 Jul 2022 07:00  --------------------------------------------------------  IN: 240 mL / OUT: 1800 mL / NET: -1560 mL    18 Jul 2022 07:01  -  18 Jul 2022 21:59  --------------------------------------------------------  IN: 360 mL / OUT: 0 mL / NET: 360 mL      T(C): 36.8 (07-18-22 @ 20:46), Max: 36.8 (07-18-22 @ 20:46)  HR: 67 (07-18-22 @ 20:46) (54 - 67)  BP: 129/67 (07-18-22 @ 20:46) (128/68 - 129/67)  RR: 18 (07-18-22 @ 20:46) (18 - 18)  SpO2: 100% (07-18-22 @ 20:46) (99% - 100%)    PHYSICAL EXAM:  GENERAL: NAD, well-developed  HEAD:  Atraumatic, Normocephalic  EYES: EOMI, PERRLA, conjunctiva and sclera clear  NECK: Supple, No JVD  CHEST/LUNG: Clear to auscultation bilaterally; No wheeze  HEART: Regular rate and rhythm; No murmurs, rubs, or gallops  ABDOMEN: Soft, Nontender, Nondistended; Bowel sounds present  EXTREMITIES:  2+ Peripheral Pulses, No clubbing, cyanosis, or edema  PSYCH: AAOx3  NEUROLOGY: non-focal  SKIN: No rashes or lesions    LABS:                        11.1   6.22  )-----------( 161      ( 18 Jul 2022 08:34 )             36.3     07-18    135  |  103  |  33<H>  ----------------------------<  99  6.0<H>   |  20<L>  |  2.55<H>    Ca    9.7      18 Jul 2022 05:12                RADIOLOGY & ADDITIONAL TESTS:    Imaging Personally Reviewed:    Consultant(s) Notes Reviewed:      Care Discussed with Consultants/Other Providers:

## 2022-07-18 NOTE — PATIENT PROFILE ADULT - FALL HARM RISK - HARM RISK INTERVENTIONS

## 2022-07-18 NOTE — PROGRESS NOTE ADULT - SUBJECTIVE AND OBJECTIVE BOX
[facility] DIVISION OF KIDNEY DISEASES AND HYPERTENSION   FOLLOW UP NOTE    --------------------------------------------------------------------------------  HPI: 71 year old man with ESRD due to bilateral nephrectomy for RCC in 2015. He received Hep C+ DDRT in 7/2018 with Simulect induction. Course complicated by DGF, mild rejection treated with steroids, distal ureteral stricture requiring multiple procedures PCN, NU, ureteral dilations , currently gets stent exchanges last in Nov 2021 by Dr. Phillip. Baseline creatinine ~ 2.7    Other PMH includes HTN, DM type II, Prostate cancer s/p Orgovyx and radiation therapy completed 8/2021 PSA down to 0.03 in Jan 2022., BPH s/p TURP in 11/2021,  SBO s/p ileostomy with reversal in 2017. Ho CMV viremia, recurrent UTIs, constipation, anal fissure.     He is admitted with urinary dribbling for the past few days and has been unable to void for the past 2 days, with continuous urinary incontinence. He had no fever, no graft pain, no hematuria, no NVD. Has constipation.     Velez was placed in the ED with good urine output. CT showed moderate hydro to transplant kidney, cystitis and air in the bladder/transplant kidney from instrumentation vs. infection.  Labs showed WBC 8.5, Cr 2.91. Urology note reviewed.     Pt. was seen and examined today, reports feeling okay. Scr improved to 2.5 today.     PAST HISTORY  --------------------------------------------------------------------------------  No significant changes to PMH, PSH, FHx, SHx, unless otherwise noted    ALLERGIES & MEDICATIONS  --------------------------------------------------------------------------------  Allergies    No Known Allergies    Intolerances      Standing Inpatient Medications  ATENolol  Tablet 25 milliGRAM(s) Oral daily  dextrose 5%. 1000 milliLiter(s) IV Continuous <Continuous>  dextrose 5%. 1000 milliLiter(s) IV Continuous <Continuous>  dextrose 50% Injectable 25 Gram(s) IV Push once  dextrose 50% Injectable 12.5 Gram(s) IV Push once  dextrose 50% Injectable 25 Gram(s) IV Push once  glucagon  Injectable 1 milliGRAM(s) IntraMuscular once  hemorrhoidal Ointment 1 Application(s) Rectal two times a day  insulin lispro (ADMELOG) corrective regimen sliding scale   SubCutaneous three times a day before meals  insulin lispro (ADMELOG) corrective regimen sliding scale   SubCutaneous at bedtime  mycophenolate mofetil 250 milliGRAM(s) Oral two times a day  NIFEdipine XL 30 milliGRAM(s) Oral daily  piperacillin/tazobactam IVPB.. 3.375 Gram(s) IV Intermittent every 8 hours  predniSONE   Tablet 5 milliGRAM(s) Oral daily  tacrolimus ER Tablet (ENVARSUS XR) 1 milliGRAM(s) Oral daily  tacrolimus ER Tablet (ENVARSUS XR) 4 milliGRAM(s) Oral daily  tamsulosin 0.4 milliGRAM(s) Oral at bedtime  valGANciclovir 450 milliGRAM(s) Oral daily    PRN Inpatient Medications  dextrose Oral Gel 15 Gram(s) Oral once PRN    VITALS/PHYSICAL EXAM  --------------------------------------------------------------------------------  T(C): 36.7 (07-18-22 @ 12:35), Max: 37.1 (07-17-22 @ 15:12)  HR: 54 (07-18-22 @ 12:35) (54 - 73)  BP: 128/68 (07-18-22 @ 12:35) (119/63 - 167/66)  RR: 18 (07-18-22 @ 12:35) (18 - 18)  SpO2: 99% (07-18-22 @ 12:35) (98% - 99%)  Wt(kg): --  Height (cm): 177.8 (07-16-22 @ 23:11)  Weight (kg): 95.3 (07-16-22 @ 23:11)  BMI (kg/m2): 30.1 (07-16-22 @ 23:11)  BSA (m2): 2.13 (07-16-22 @ 23:11)      07-17-22 @ 07:01  -  07-18-22 @ 07:00  --------------------------------------------------------  IN: 240 mL / OUT: 1800 mL / NET: -1560 mL    Physical Exam:  	Gen: NAD  	HEENT: Anicteric  	Pulm: CTA B/L  	CV: S1S2+  	Abd: Soft, +BS              Transplant site: RLQ non tender, well healed surgical scar+  	Ext: No LE edema B/L  	Neuro: Awake             : Agustin cath+ with clear urine  	Skin: Warm and dry    LABS/STUDIES  --------------------------------------------------------------------------------              11.1   6.22  >-----------<  161      [07-18-22 @ 08:34]              36.3     135  |  103  |  33  ----------------------------<  99      [07-18-22 @ 05:12]  6.0   |  20  |  2.55        Ca     9.7     [07-18-22 @ 05:12]    Creatinine Trend:  SCr 2.55 [07-18 @ 05:12]  SCr 2.68 [07-16 @ 19:03]  SCr 2.91 [07-16 @ 15:03]    Urinalysis - [07-16-22 @ 16:00]      Color Yellow / Appearance Slightly Turbid / SG 1.018 / pH 6.0      Gluc Negative / Ketone Negative  / Bili Negative / Urobili Negative       Blood Large / Protein 100 mg/dL / Leuk Est Large / Nitrite Negative       /  / Hyaline 2 / Gran  / Sq Epi  / Non Sq Epi 0 / Bacteria Negative    TacrolimusTacrolimus (), Serum: 2.5 ng/mL (07-17 @ 09:17)

## 2022-07-18 NOTE — PROGRESS NOTE ADULT - ASSESSMENT
72 y/o man s/p DDRT in 7/2018 with chronic graft dysfunction with baseline creatinine of 2.7-3mg/dL, history of transplant ureteric stricture with stent exchanges last in 11/2021, h/o BPH s/p TURP, Prostate cancer completed RT in 8/2021,  admitted with urine retention, armenta placed, U/A +ve, started on zosyn.

## 2022-07-18 NOTE — PROGRESS NOTE ADULT - ASSESSMENT
WORKUP  UA positive    Prior Cultures  Urine Cx Pseudomonas (zosyn sen), E.cloacae (Zosyn sen)and more recently E.faecium (Vanc sensitive)    DIAGNOSIS and IMPRESSION  71M s/p DDRT in 7/2018 with chronic graft dysfunction with baseline creatinine of 2.7-3mg/dL, history of transplant ureteric stricture with stent exchanges last in 11/2021, h/o BPH s/p TURP, Prostate cancer completed RT in 8/2021,  admitted with urine retention and UTI.     #Urinary Retention  #Positive UA  - afebrile, without symptoms   - BCx (7/16) NGTD  - c/w zosyn for now  - f/up UCx   - Urology regarding stent and urinary retention    #S/p DDRT in 2018  - Giovanny creatinine 1.9 but recently 2.7-3mg/dL  - Graft function relatively stable  - Cr improving  - c/w Bactrim ppx      PT TO BE SEEN. PRELIM NOTE  PENDING RECS. PLEASE WAIT FOR FINAL RECS AFTER DISCUSSION WITH ATTENDINGZachary Inman DO, PGY-4   ID fellow  [employer] Teams Preferred  After 5pm/weekends call [phone]   WORKUP  UA positive    Prior Cultures  Urine Cx Pseudomonas (zosyn sen), E.cloacae (Zosyn sen)and more recently E.faecium (Vanc sensitive)    DIAGNOSIS and IMPRESSION  71M s/p DDRT in 7/2018 with chronic graft dysfunction with baseline creatinine of 2.7-3mg/dL, history of transplant ureteric stricture with stent exchanges last in 11/2021, h/o BPH s/p TURP, Prostate cancer completed RT in 8/2021,  admitted with urine retention and UTI.     #Urinary Retention  #Positive UA  - afebrile, without symptoms   - UA with pyuria and rbc, +LE, without nitrite, bacteria  - BCx (7/16) NGTD  - c/w zosyn for now  - f/up UCx   - Urology regarding stent and urinary retention    #S/p DDRT in 2018  - Giovanny creatinine 1.9 but recently 2.7-3mg/dL  - Graft function relatively stable  - Cr improving  - c/w Bactrim ppx      PT TO BE SEEN. PRELIM NOTE  PENDING RECS. PLEASE WAIT FOR FINAL RECS AFTER DISCUSSION WITH ATTENDINGZachary Inman DO, PGY-4   ID fellow  [employer] Teams Preferred  After 5pm/weekends call [phone]

## 2022-07-18 NOTE — GOALS OF CARE CONVERSATION - ADVANCED CARE PLANNING - CONVERSATION DETAILS
This alert and oriented x 4 patient was seen today for goals of care planning and conversation. Introduced self and role of PCE. Patient states understanding of health status  and prognosis. Patient states he  lives with spouse and has been independent with all aspects of ADLs prior to admission. Patient states he  has prior HCP and identifies the agent as Gianna Medrano (spouse) [phone]  and  alternate  as Martina Ram (sister) [phone]. Copy requested for medical records.     CPR , intubation, artificial nutrition  procedures and possible complications explained. Patient reports that his sister is a nurse who he had many discussion with  regarding his codestatus as well as his wife. Patient states he would like to be DNR  and would not like any heroic measures to save his life. Patient also states that he does not want artificial nutrition as he would not like to live in a vegetative state. Patient aware he will remain FULL CODE.      Pt instructed to provide a copy of HCP  for medical records.  Contact information for further needs provided.  No Jain exemptions noted.      Amie Holcomb  MSN-ED RN OCN     [phone] This alert and oriented x 4 patient was seen today for goals of care planning and conversation. Introduced self and role of PCE. Patient states understanding of health status  and prognosis. Patient states he  lives with spouse and has been independent with all aspects of ADLs prior to admission. Patient states he  has prior HCP and identifies the agent as Gianna Medrano (spouse) [phone]  and  alternate  as Martina Ram (sister) [phone]. Copy requested for medical records.     CPR , intubation, artificial nutrition  procedures and possible complications explained. Patient watched informational video on GO.     Patient expressed wish to be DNR. Patient reports that his sister is a nurse who he had many discussion with  regarding his codestatus as well as his wife. Patient states he would like to be DNR  and would not like any heroic measures to save his life. Patient also states that he does not want artificial nutrition as he would not like to live in a vegetative state. Patient aware he will remain FULL CODE.      Pt instructed to provide a copy of HCP  for medical records.  Contact information for further needs provided.  No Mu-ism exemptions noted.      Amie Holcomb  MSN-ED RN OCN     [phone]

## 2022-07-18 NOTE — PROGRESS NOTE ADULT - ASSESSMENT
# Acute Pyelonephritis CT showed moderate hydro to transplant kidney, cystitis and air in the bladder/transplant kidney from instrumentation vs. infection.     IV Zosyn Follow up Urine Blood cx   Follow up ID consult appreciated       # Ureteral stricture Transplant [facility] Urology consult apprecaited   Plan for non urgent stent exchange    Velez for now   Out patient follow up     # Kidney Transplant Transplant team consult appreciated   Continue with Tacrolimus, Cell cept, Prednisone    Monitor Tacro levels

## 2022-07-19 LAB
-  AMPICILLIN: SIGNIFICANT CHANGE UP
-  CIPROFLOXACIN: SIGNIFICANT CHANGE UP
-  LEVOFLOXACIN: SIGNIFICANT CHANGE UP
-  NITROFURANTOIN: SIGNIFICANT CHANGE UP
-  TETRACYCLINE: SIGNIFICANT CHANGE UP
-  VANCOMYCIN: SIGNIFICANT CHANGE UP
ANION GAP SERPL CALC-SCNC: 11 MMOL/L — SIGNIFICANT CHANGE UP (ref 5–17)
BUN SERPL-MCNC: 38 MG/DL — HIGH (ref 7–23)
CALCIUM SERPL-MCNC: 9.8 MG/DL — SIGNIFICANT CHANGE UP (ref 8.4–10.5)
CHLORIDE SERPL-SCNC: 104 MMOL/L — SIGNIFICANT CHANGE UP (ref 96–108)
CO2 SERPL-SCNC: 22 MMOL/L — SIGNIFICANT CHANGE UP (ref 22–31)
CREAT SERPL-MCNC: 2.85 MG/DL — HIGH (ref 0.5–1.3)
CULTURE RESULTS: SIGNIFICANT CHANGE UP
EGFR: 23 ML/MIN/1.73M2 — LOW
GLUCOSE BLDC GLUCOMTR-MCNC: 140 MG/DL — HIGH (ref 70–99)
GLUCOSE BLDC GLUCOMTR-MCNC: 155 MG/DL — HIGH (ref 70–99)
GLUCOSE BLDC GLUCOMTR-MCNC: 163 MG/DL — HIGH (ref 70–99)
GLUCOSE BLDC GLUCOMTR-MCNC: 164 MG/DL — HIGH (ref 70–99)
GLUCOSE SERPL-MCNC: 99 MG/DL — SIGNIFICANT CHANGE UP (ref 70–99)
HCT VFR BLD CALC: 35.1 % — LOW (ref 39–50)
HGB BLD-MCNC: 11 G/DL — LOW (ref 13–17)
MCHC RBC-ENTMCNC: 28.7 PG — SIGNIFICANT CHANGE UP (ref 27–34)
MCHC RBC-ENTMCNC: 31.3 GM/DL — LOW (ref 32–36)
MCV RBC AUTO: 91.6 FL — SIGNIFICANT CHANGE UP (ref 80–100)
METHOD TYPE: SIGNIFICANT CHANGE UP
NRBC # BLD: 0 /100 WBCS — SIGNIFICANT CHANGE UP (ref 0–0)
ORGANISM # SPEC MICROSCOPIC CNT: SIGNIFICANT CHANGE UP
ORGANISM # SPEC MICROSCOPIC CNT: SIGNIFICANT CHANGE UP
PLATELET # BLD AUTO: 174 K/UL — SIGNIFICANT CHANGE UP (ref 150–400)
POTASSIUM SERPL-MCNC: 4.5 MMOL/L — SIGNIFICANT CHANGE UP (ref 3.5–5.3)
POTASSIUM SERPL-SCNC: 4.5 MMOL/L — SIGNIFICANT CHANGE UP (ref 3.5–5.3)
RBC # BLD: 3.83 M/UL — LOW (ref 4.2–5.8)
RBC # FLD: 16.1 % — HIGH (ref 10.3–14.5)
SODIUM SERPL-SCNC: 137 MMOL/L — SIGNIFICANT CHANGE UP (ref 135–145)
SPECIMEN SOURCE: SIGNIFICANT CHANGE UP
TACROLIMUS SERPL-MCNC: 7.4 NG/ML — SIGNIFICANT CHANGE UP
WBC # BLD: 6.86 K/UL — SIGNIFICANT CHANGE UP (ref 3.8–10.5)
WBC # FLD AUTO: 6.86 K/UL — SIGNIFICANT CHANGE UP (ref 3.8–10.5)

## 2022-07-19 PROCEDURE — 99232 SBSQ HOSP IP/OBS MODERATE 35: CPT | Mod: GC

## 2022-07-19 RX ADMIN — PIPERACILLIN AND TAZOBACTAM 25 GRAM(S): 4; .5 INJECTION, POWDER, LYOPHILIZED, FOR SOLUTION INTRAVENOUS at 13:38

## 2022-07-19 RX ADMIN — TAMSULOSIN HYDROCHLORIDE 0.4 MILLIGRAM(S): 0.4 CAPSULE ORAL at 23:08

## 2022-07-19 RX ADMIN — PIPERACILLIN AND TAZOBACTAM 25 GRAM(S): 4; .5 INJECTION, POWDER, LYOPHILIZED, FOR SOLUTION INTRAVENOUS at 04:57

## 2022-07-19 RX ADMIN — Medication 100 MILLIGRAM(S): at 12:28

## 2022-07-19 RX ADMIN — PHENYLEPHRINE-SHARK LIVER OIL-MINERAL OIL-PETROLATUM RECTAL OINTMENT 1 APPLICATION(S): at 05:01

## 2022-07-19 RX ADMIN — TACROLIMUS 4 MILLIGRAM(S): 5 CAPSULE ORAL at 04:54

## 2022-07-19 RX ADMIN — Medication 1: at 12:28

## 2022-07-19 RX ADMIN — MYCOPHENOLATE MOFETIL 250 MILLIGRAM(S): 250 CAPSULE ORAL at 17:41

## 2022-07-19 RX ADMIN — Medication 650 MILLIGRAM(S): at 04:59

## 2022-07-19 RX ADMIN — Medication 650 MILLIGRAM(S): at 17:41

## 2022-07-19 RX ADMIN — Medication 5 MILLIGRAM(S): at 04:56

## 2022-07-19 RX ADMIN — VALGANCICLOVIR 450 MILLIGRAM(S): 450 TABLET, FILM COATED ORAL at 04:54

## 2022-07-19 RX ADMIN — TACROLIMUS 1 MILLIGRAM(S): 5 CAPSULE ORAL at 04:55

## 2022-07-19 RX ADMIN — Medication 30 MILLIGRAM(S): at 04:55

## 2022-07-19 RX ADMIN — PIPERACILLIN AND TAZOBACTAM 25 GRAM(S): 4; .5 INJECTION, POWDER, LYOPHILIZED, FOR SOLUTION INTRAVENOUS at 20:53

## 2022-07-19 RX ADMIN — PHENYLEPHRINE-SHARK LIVER OIL-MINERAL OIL-PETROLATUM RECTAL OINTMENT 1 APPLICATION(S): at 17:42

## 2022-07-19 RX ADMIN — MYCOPHENOLATE MOFETIL 250 MILLIGRAM(S): 250 CAPSULE ORAL at 04:54

## 2022-07-19 RX ADMIN — ATENOLOL 25 MILLIGRAM(S): 25 TABLET ORAL at 04:56

## 2022-07-19 RX ADMIN — Medication 1: at 17:40

## 2022-07-19 NOTE — PROGRESS NOTE ADULT - SUBJECTIVE AND OBJECTIVE BOX
Follow Up:  No complaints this morning. Denies fever or chills.    Interval History/ROS:Patient is a 71y old  Male who presents with a chief complaint of Abdominal pain x 2 days (18 Jul 2022 21:59)      REVIEW OF SYSTEMS  Denied fevers, chills, night sweats, rash, cough, coryza, dysuria, loose stools      Allergies  No Known Allergies    ANTIMICROBIALS:    piperacillin/tazobactam IVPB.. 3.375 every 8 hours  valGANciclovir 450 <User Schedule>        OTHER MEDS: MEDICATIONS  (STANDING):  allopurinol 100 daily  ATENolol  Tablet 25 daily  dextrose 50% Injectable 25 once  dextrose 50% Injectable 12.5 once  dextrose 50% Injectable 25 once  dextrose Oral Gel 15 once PRN  glucagon  Injectable 1 once  insulin lispro (ADMELOG) corrective regimen sliding scale  three times a day before meals  insulin lispro (ADMELOG) corrective regimen sliding scale  at bedtime  mycophenolate mofetil 250 two times a day  NIFEdipine XL 30 daily  predniSONE   Tablet 5 daily  tacrolimus ER Tablet (ENVARSUS XR) 1 daily  tacrolimus ER Tablet (ENVARSUS XR) 4 daily  tamsulosin 0.4 at bedtime      Vital Signs Last 24 Hrs  T(F): 97.5 (07-19-22 @ 04:24), Max: 98.8 (07-16-22 @ 12:07)    Vital Signs Last 24 Hrs  HR: 65 (07-19-22 @ 04:24) (54 - 67)  BP: 133/63 (07-19-22 @ 04:24) (128/68 - 133/63)  RR: 18 (07-19-22 @ 04:24)  SpO2: 100% (07-19-22 @ 04:24) (99% - 100%)  Wt(kg): --    EXAM:  Constitutional:  well preserved, comfortable  Head/Eyes: no icterus, PERRL, EOMI  ENT:  supple; no thrush  LUNGS:  CTA  CVS:  normal S1, S2, no murmur  Abd:  soft, non-tender; non-distended, no CVA tenderness  : +Velez with yellow urine  Ext:  no edema  Vascular:  IV site no erythema tenderness or discharge  MSK:  joints without swelling  Neuro: AAO X 3, non- focal    Labs:                        11.0   6.86  )-----------( 174      ( 19 Jul 2022 04:41 )             35.1     07-19    137  |  104  |  38<H>  ----------------------------<  99  4.5   |  22  |  2.85<H>    Ca    9.8      19 Jul 2022 04:41        WBC Trend:  WBC Count: 6.86 (07-19-22 @ 04:41)  WBC Count: 6.22 (07-18-22 @ 08:34)  WBC Count: 8.46 (07-16-22 @ 15:03)      Creatine Trend:  Creatinine, Serum: 2.85 (07-19)  Creatinine, Serum: 2.55 (07-18)  Creatinine, Serum: 2.68 (07-16)  Creatinine, Serum: 2.91 (07-16)      Liver Biochemical Testing Trend:  Alanine Aminotransferase (ALT/SGPT): 15 (07-16)  Alanine Aminotransferase (ALT/SGPT): 18 (10-22)  Alanine Aminotransferase (ALT/SGPT): 28 (09-06)  Alanine Aminotransferase (ALT/SGPT): 12 (08-24)  Alanine Aminotransferase (ALT/SGPT): 10 (08-23)  Aspartate Aminotransferase (AST/SGOT): 30 (07-16-22 @ 15:03)  Aspartate Aminotransferase (AST/SGOT): 30 (10-22-21 @ 13:47)  Aspartate Aminotransferase (AST/SGOT): 28 (09-06-21 @ 03:47)  Aspartate Aminotransferase (AST/SGOT): 20 (08-24-21 @ 06:33)  Aspartate Aminotransferase (AST/SGOT): 15 (08-23-21 @ 10:12)  Bilirubin Total, Serum: 0.2 (07-16)  Bilirubin Total, Serum: 0.2 (10-22)  Bilirubin Total, Serum: 0.2 (09-06)  Bilirubin Total, Serum: 0.2 (08-24)  Bilirubin Total, Serum: 0.3 (08-23)  Trend LDH    MICROBIOLOGY:    Culture - Blood (collected 16 Jul 2022 18:30)  Source: .Blood Blood-Venous  Preliminary Report:    No growth to date.    Culture - Blood (collected 16 Jul 2022 18:15)  Source: .Blood Blood-Peripheral  Preliminary Report:    No growth to date.    Culture - Urine (collected 16 Jul 2022 16:00)  Source: Clean Catch Clean Catch (Midstream)  Preliminary Report:    Culture in progress    GI PCR Panel, Stool (collected 24 Aug 2021 13:27)  Source: .Stool Feces  Final Report:    GI PCR Results: NOT detected    *******Please Note:*******    GI panel PCR evaluates for:    Campylobacter, Plesiomonas shigelloides, Salmonella,    Vibrio, Yersinia enterocolitica, Enteroaggregative    Escherichia coli (EAEC), Enteropathogenic E.coli (EPEC),    Enterotoxigenic E. coli (ETEC) lt/st, Shiga-like    toxin-producing E. coli (STEC) stx1/stx2,    Shigella/ Enteroinvasive E. coli (EIEC), Cryptosporidium,    Cyclospora cayetanensis, Entamoeba histolytica,    Giardia lamblia, Adenovirus F 40/41, Astrovirus,    Norovirus GI/GII, Rotavirus A, Sapovirus    Culture - Blood (collected 21 Aug 2021 14:41)  Source: .Blood Blood  Final Report:    No Growth Final    Culture - Blood (collected 20 Aug 2021 20:55)  Source: .Blood Blood-Peripheral  Final Report:    No Growth Final    Culture - Urine (collected 20 Aug 2021 09:02)  Source: Clean Catch Clean Catch (Midstream)  Final Report:    >100,000 CFU/ml Pseudomonas aeruginosa  Organism: Pseudomonas aeruginosa  Organism: Pseudomonas aeruginosa    Sensitivities:      -  Amikacin: S <=16      -  Aztreonam: S 8      -  Cefepime: S 4      -  Ceftazidime: S 4      -  Ciprofloxacin: R >2      -  Gentamicin: S <=2      -  Imipenem: I 4      -  Levofloxacin: R >4      -  Meropenem: I 4      -  Piperacillin/Tazobactam: S <=8      -  Tobramycin: S <=2      Method Type: AUTUMN    GI PCR Panel, Stool (collected 02 Aug 2021 22:32)  Source: .Stool Feces  Final Report:    GI PCR Results: NOT detected    *******Please Note:*******    GI panel PCR evaluates for:    Campylobacter, Plesiomonas shigelloides, Salmonella,    Vibrio, Yersinia enterocolitica, Enteroaggregative    Escherichia coli (EAEC), Enteropathogenic E.coli (EPEC),    Enterotoxigenic E. coli (ETEC) lt/st, Shiga-like    toxin-producing E. coli (STEC) stx1/stx2,    Shigella/ Enteroinvasive E. coli (EIEC), Cryptosporidium,    Cyclospora cayetanensis, Entamoeba histolytica,    Giardia lamblia, Adenovirus F 40/41, Astrovirus,    Norovirus GI/GII, Rotavirus A, Sapovirus    Culture - Urine (collected 01 Aug 2021 09:37)  Source: Clean Catch Clean Catch (Midstream)  Final Report:    >100,000 CFU/ml Pseudomonas aeruginosa    Multiple Morphological Strains  Organism: Pseudomonas aeruginosa  Pseudomonas aeruginosa  Organism: Pseudomonas aeruginosa    Sensitivities:      -  Amikacin: S <=16      -  Aztreonam: I 16      -  Cefepime: S 4      -  Ceftazidime: S 4      -  Ciprofloxacin: R >2      -  Gentamicin: S <=2      -  Imipenem: S 2      -  Levofloxacin: R >4      -  Meropenem: S 2      -  Piperacillin/Tazobactam: S <=8      -  Tobramycin: S <=2      Method Type: AUTUMN  Organism: Pseudomonas aeruginosa    Sensitivities:      -  Amikacin: S <=16      -  Aztreonam: S 8      -  Cefepime: S 4      -  Ceftazidime: S 4      -  Ciprofloxacin: R >2      -  Gentamicin: S <=2      -  Imipenem: I 4      -  Levofloxacin: R >4      -  Meropenem: S <=1      -  Piperacillin/Tazobactam: S <=8      -  Tobramycin: S <=2      Method Type: AUTUMN    Culture - Blood (collected 02 Jun 2021 05:18)  Source: .Blood Blood-Venous  Final Report:    No Growth Final    HIV-1 RNA Quantitative, Viral Load:   NOT DET. (08-15-18 @ 14:37)  HIV-1 Viral Load Result: NOT DET. (08-15-18 @ 14:37)      COVID-19 [place] Domain AB Interp: Positive (08-21-21 @ 09:20)  COVID-19 [place] Domain AB Interp: Positive (08-02-21 @ 13:47)      Blood Gas Venous - Lactate: 1.6 (07-16 @ 14:00)    RADIOLOGY:  imaging below personally reviewed    Xray Chest 1 View- PORTABLE-Urgent:  (16 Jul 2022 17:24)

## 2022-07-19 NOTE — PROGRESS NOTE ADULT - ASSESSMENT
# Acute Pyelonephritis CT showed moderate hydro to transplant kidney, cystitis and air in the bladder/transplant kidney from instrumentation vs. infection.     IV Zosyn Follow up Urine  Blood cx no growth   ID following     # Ureteral stricture Transplant T[facility] Urology consult apprecaited   Plan for non urgent stent exchange    Velez for now       # Kidney Transplant Transplant team consult appreciated   Continue with Tacrolimus, Cell cept   Monitor Tacro levels

## 2022-07-19 NOTE — PROGRESS NOTE ADULT - PROBLEM SELECTOR PLAN 2
Continue Envarsus 5mg po daily, Cellcept 250mg po bid, Prednisone 5mg daily.   tacro levels today at 7.4. Dose at 7 AM daily. Check tacrolimus trough, 30 mins prior to AM dose. Continue current dose for now. Tacrolimus trough goal 5-7.  Continue Bactrim for PPX.
Continue Envarsus 5mg po daily, Cellcept 250mg po bid, Prednisone 5mg daily.   Check tacrolimus trough, 30 mins prior to AM dose. Tacrolimus trough goal 5-7. No levels available to review today.

## 2022-07-19 NOTE — PROGRESS NOTE ADULT - SUBJECTIVE AND OBJECTIVE BOX
[facility] DIVISION OF KIDNEY DISEASES AND HYPERTENSION   FOLLOW UP NOTE    --------------------------------------------------------------------------------  HPI: 71 year old man with ESRD due to bilateral nephrectomy for RCC in 2015. He received Hep C+ DDRT in 7/2018 with Simulect induction. Course complicated by DGF, mild rejection treated with steroids, distal ureteral stricture requiring multiple procedures PCN, NU, ureteral dilations , currently gets stent exchanges last in Nov 2021 by Dr. Phillip. Baseline creatinine ~ 2.7    Other PMH includes HTN, DM type II, Prostate cancer s/p Orgovyx and radiation therapy completed 8/2021 PSA down to 0.03 in Jan 2022., BPH s/p TURP in 11/2021,  SBO s/p ileostomy with reversal in 2017. Ho CMV viremia, recurrent UTIs, constipation, anal fissure.     He is admitted with urinary dribbling for the past few days and has been unable to void for the past 2 days, with continuous urinary incontinence. He had no fever, no graft pain, no hematuria, no NVD. Has constipation.     Velez was placed in the ED with good urine output. CT showed moderate hydro to transplant kidney, cystitis and air in the bladder/transplant kidney from instrumentation vs. infection.  Labs showed WBC 8.5, Cr 2.91. Urology note reviewed.     Pt. was seen and examined today, reports feeling okay. Scr stable at 2.8 today with UOP of 1300 in past ~6 hours.    PAST HISTORY  --------------------------------------------------------------------------------  No significant changes to PMH, PSH, FHx, SHx, unless otherwise noted    ALLERGIES & MEDICATIONS  --------------------------------------------------------------------------------  Allergies    No Known Allergies    Intolerances      Standing Inpatient Medications  allopurinol 100 milliGRAM(s) Oral daily  ATENolol  Tablet 25 milliGRAM(s) Oral daily  dextrose 5%. 1000 milliLiter(s) IV Continuous <Continuous>  dextrose 5%. 1000 milliLiter(s) IV Continuous <Continuous>  dextrose 50% Injectable 25 Gram(s) IV Push once  dextrose 50% Injectable 12.5 Gram(s) IV Push once  dextrose 50% Injectable 25 Gram(s) IV Push once  glucagon  Injectable 1 milliGRAM(s) IntraMuscular once  hemorrhoidal Ointment 1 Application(s) Rectal two times a day  insulin lispro (ADMELOG) corrective regimen sliding scale   SubCutaneous three times a day before meals  insulin lispro (ADMELOG) corrective regimen sliding scale   SubCutaneous at bedtime  mycophenolate mofetil 250 milliGRAM(s) Oral two times a day  NIFEdipine XL 30 milliGRAM(s) Oral daily  piperacillin/tazobactam IVPB.. 3.375 Gram(s) IV Intermittent every 8 hours  predniSONE   Tablet 5 milliGRAM(s) Oral daily  sodium bicarbonate 650 milliGRAM(s) Oral two times a day  tacrolimus ER Tablet (ENVARSUS XR) 1 milliGRAM(s) Oral daily  tacrolimus ER Tablet (ENVARSUS XR) 4 milliGRAM(s) Oral daily  tamsulosin 0.4 milliGRAM(s) Oral at bedtime  valGANciclovir 450 milliGRAM(s) Oral <User Schedule>    PRN Inpatient Medications  dextrose Oral Gel 15 Gram(s) Oral once PRN    VITALS/PHYSICAL EXAM  --------------------------------------------------------------------------------  T(C): 36.7 (07-19-22 @ 12:16), Max: 36.8 (07-18-22 @ 20:46)  HR: 58 (07-19-22 @ 12:16) (58 - 67)  BP: 128/64 (07-19-22 @ 12:16) (128/64 - 133/63)  RR: 18 (07-19-22 @ 12:16) (18 - 18)  SpO2: 99% (07-19-22 @ 12:16) (99% - 100%)  Wt(kg): --      07-18-22 @ 07:01  -  07-19-22 @ 07:00  --------------------------------------------------------  IN: 360 mL / OUT: 1100 mL / NET: -740 mL    07-19-22 @ 07:01  -  07-19-22 @ 13:05  --------------------------------------------------------  IN: 255 mL / OUT: 1300 mL / NET: -1045 mL    Physical Exam:  	Gen: NAD  	HEENT: Anicteric  	Pulm: CTA B/L  	CV: S1S2+  	Abd: Soft, +BS              Transplant site: RLQ non tender, well healed surgical scar+  	Ext: No LE edema B/L  	Neuro: Awake             : Agustin cath+ with clear urine  	Skin: Warm and dry    LABS/STUDIES  --------------------------------------------------------------------------------              11.0   6.86  >-----------<  174      [07-19-22 @ 04:41]              35.1     137  |  104  |  38  ----------------------------<  99      [07-19-22 @ 04:41]  4.5   |  22  |  2.85        Ca     9.8     [07-19-22 @ 04:41]    Creatinine Trend:  SCr 2.85 [07-19 @ 04:41]  SCr 2.55 [07-18 @ 05:12]  SCr 2.68 [07-16 @ 19:03]  SCr 2.91 [07-16 @ 15:03]    Urinalysis - [07-16-22 @ 16:00]      Color Yellow / Appearance Slightly Turbid / SG 1.018 / pH 6.0      Gluc Negative / Ketone Negative  / Bili Negative / Urobili Negative       Blood Large / Protein 100 mg/dL / Leuk Est Large / Nitrite Negative       /  / Hyaline 2 / Gran  / Sq Epi  / Non Sq Epi 0 / Bacteria Negative    TacrolimusTacrolimus (), Serum: 7.4 ng/mL (07-19 @ 04:41)  Tacrolimus (), Serum: 2.5 ng/mL (07-17 @ 09:17)

## 2022-07-19 NOTE — PROGRESS NOTE ADULT - PROBLEM SELECTOR PLAN 3
s/p armenta placement. Pt with BPH s/p TURP 11/2021 and Prostate cancer s/p androgen depletion and RT. Last PSA was 0.03 in Jan 2022  Continue Zosyn,  F/u urine culture. Keep Armenta, continue Flomax.    Transplant ureteric stricture - last stent exchange in 11/2021 - urology note reviewed. Discussed with Urology.
s/p armenta placement. Pt with BPH s/p TURP 11/2021 and Prostate cancer s/p androgen depletion and RT. Last PSA was 0.03 in Jan 2022  Continue Zosyn,  F/u urine culture. Keep Armenta, continue Flomax.    Transplant ureteric stricture - last stent exchange in 11/2021 - urology note reviewed.

## 2022-07-19 NOTE — PROGRESS NOTE ADULT - PROBLEM SELECTOR PLAN 1
S/p DDRT in 2018. Giovanny creatinine 1.9 but recently 2.7-3mg/dL. SCr elevated at 2.91 on admission, improved to 2.5 today. Graft function relatively stable.   Monitor labs.
S/p DDRT in 2018. Giovanny creatinine 1.9 but recently 2.7-3mg/dL. SCr elevated at 2.91 on admission, stable at 2.8 today. Graft function relatively stable. UOP of 1300 cc as documented.   Monitor labs.

## 2022-07-19 NOTE — PROGRESS NOTE ADULT - ASSESSMENT
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     #Urinary Retention  #Positive UA  - afebrile, without symptoms   - UA with pyuria and rbc, +LE, without nitrite, bacteria  - BCx (7/16) NGTD  - c/w zosyn for now  - f/up UCx   - Urology regarding stent and urinary retention    #S/p DDRT in 2018  - Giovanny creatinine 1.9 but recently 2.7-3mg/dL  - Graft function relatively stable  - Cr improving  - c/w Bactrim ppx      PT TO BE SEEN. PRELIM NOTE  PENDING RECS. PLEASE WAIT FOR FINAL RECS AFTER DISCUSSION WITH ATTENDINGZachary Inman DO, PGY-4   ID fellow  [employer] Teams Preferred  After 5pm/weekends call [phone]

## 2022-07-19 NOTE — PROGRESS NOTE ADULT - SUBJECTIVE AND OBJECTIVE BOX
Patient is a 71y old  Male who presents with a chief complaint of Abdominal pain x 2 days (19 Jul 2022 13:05)      SUBJECTIVE / OVERNIGHT EVENTS: no events     MEDICATIONS  (STANDING):  allopurinol 100 milliGRAM(s) Oral daily  ATENolol  Tablet 25 milliGRAM(s) Oral daily  dextrose 5%. 1000 milliLiter(s) (100 mL/Hr) IV Continuous <Continuous>  dextrose 5%. 1000 milliLiter(s) (50 mL/Hr) IV Continuous <Continuous>  dextrose 50% Injectable 25 Gram(s) IV Push once  dextrose 50% Injectable 12.5 Gram(s) IV Push once  dextrose 50% Injectable 25 Gram(s) IV Push once  glucagon  Injectable 1 milliGRAM(s) IntraMuscular once  hemorrhoidal Ointment 1 Application(s) Rectal two times a day  insulin lispro (ADMELOG) corrective regimen sliding scale   SubCutaneous three times a day before meals  insulin lispro (ADMELOG) corrective regimen sliding scale   SubCutaneous at bedtime  mycophenolate mofetil 250 milliGRAM(s) Oral two times a day  NIFEdipine XL 30 milliGRAM(s) Oral daily  piperacillin/tazobactam IVPB.. 3.375 Gram(s) IV Intermittent every 8 hours  predniSONE   Tablet 5 milliGRAM(s) Oral daily  sodium bicarbonate 650 milliGRAM(s) Oral two times a day  tacrolimus ER Tablet (ENVARSUS XR) 1 milliGRAM(s) Oral daily  tacrolimus ER Tablet (ENVARSUS XR) 4 milliGRAM(s) Oral daily  tamsulosin 0.4 milliGRAM(s) Oral at bedtime  trimethoprim   80 mG/sulfamethoxazole 400 mG 1 Tablet(s) Oral daily  valGANciclovir 450 milliGRAM(s) Oral <User Schedule>    MEDICATIONS  (PRN):  dextrose Oral Gel 15 Gram(s) Oral once PRN Blood Glucose LESS THAN 70 milliGRAM(s)/deciliter      CAPILLARY BLOOD GLUCOSE      POCT Blood Glucose.: 164 mg/dL (19 Jul 2022 21:41)  POCT Blood Glucose.: 163 mg/dL (19 Jul 2022 16:52)  POCT Blood Glucose.: 155 mg/dL (19 Jul 2022 12:24)  POCT Blood Glucose.: 140 mg/dL (19 Jul 2022 07:52)    I&O's Summary    18 Jul 2022 07:01  -  19 Jul 2022 07:00  --------------------------------------------------------  IN: 360 mL / OUT: 1100 mL / NET: -740 mL    19 Jul 2022 07:01  -  19 Jul 2022 23:05  --------------------------------------------------------  IN: 760 mL / OUT: 3000 mL / NET: -2240 mL      T(C): 37.3 (07-19-22 @ 20:51), Max: 37.3 (07-19-22 @ 20:51)  HR: 63 (07-19-22 @ 20:51) (58 - 63)  BP: 142/69 (07-19-22 @ 20:51) (128/64 - 142/69)  RR: 18 (07-19-22 @ 20:51) (18 - 18)  SpO2: 100% (07-19-22 @ 20:51) (99% - 100%)    PHYSICAL EXAM:  GENERAL: NAD, well-developed  HEAD:  Atraumatic, Normocephalic  EYES: EOMI, PERRLA, conjunctiva and sclera clear  NECK: Supple, No JVD  CHEST/LUNG: Clear to auscultation bilaterally; No wheeze  HEART: Regular rate and rhythm; No murmurs, rubs, or gallops  ABDOMEN: Soft, Nontender, Nondistended; Bowel sounds present  EXTREMITIES:  2+ Peripheral Pulses, No clubbing, cyanosis, or edema  PSYCH: AAOx3  NEUROLOGY: non-focal  SKIN: No rashes or lesions    LABS:                        11.0   6.86  )-----------( 174      ( 19 Jul 2022 04:41 )             35.1     07-19    137  |  104  |  38<H>  ----------------------------<  99  4.5   |  22  |  2.85<H>    Ca    9.8      19 Jul 2022 04:41                RADIOLOGY & ADDITIONAL TESTS:    Imaging Personally Reviewed:    Consultant(s) Notes Reviewed:      Care Discussed with Consultants/Other Providers:

## 2022-07-20 ENCOUNTER — TRANSCRIPTION ENCOUNTER (OUTPATIENT)
Age: 72
End: 2022-07-20

## 2022-07-20 LAB
ANION GAP SERPL CALC-SCNC: 13 MMOL/L — SIGNIFICANT CHANGE UP (ref 5–17)
BUN SERPL-MCNC: 38 MG/DL — HIGH (ref 7–23)
CALCIUM SERPL-MCNC: 9.8 MG/DL — SIGNIFICANT CHANGE UP (ref 8.4–10.5)
CHLORIDE SERPL-SCNC: 102 MMOL/L — SIGNIFICANT CHANGE UP (ref 96–108)
CO2 SERPL-SCNC: 21 MMOL/L — LOW (ref 22–31)
CREAT SERPL-MCNC: 2.73 MG/DL — HIGH (ref 0.5–1.3)
EGFR: 24 ML/MIN/1.73M2 — LOW
GLUCOSE BLDC GLUCOMTR-MCNC: 130 MG/DL — HIGH (ref 70–99)
GLUCOSE BLDC GLUCOMTR-MCNC: 140 MG/DL — HIGH (ref 70–99)
GLUCOSE BLDC GLUCOMTR-MCNC: 157 MG/DL — HIGH (ref 70–99)
GLUCOSE BLDC GLUCOMTR-MCNC: 166 MG/DL — HIGH (ref 70–99)
GLUCOSE SERPL-MCNC: 125 MG/DL — HIGH (ref 70–99)
POTASSIUM SERPL-MCNC: 4.6 MMOL/L — SIGNIFICANT CHANGE UP (ref 3.5–5.3)
POTASSIUM SERPL-SCNC: 4.6 MMOL/L — SIGNIFICANT CHANGE UP (ref 3.5–5.3)
SODIUM SERPL-SCNC: 136 MMOL/L — SIGNIFICANT CHANGE UP (ref 135–145)

## 2022-07-20 PROCEDURE — 99232 SBSQ HOSP IP/OBS MODERATE 35: CPT | Mod: GC

## 2022-07-20 RX ORDER — LINEZOLID 600 MG/300ML
1 INJECTION, SOLUTION INTRAVENOUS
Qty: 14 | Refills: 0
Start: 2022-07-20 | End: 2022-07-26

## 2022-07-20 RX ORDER — DIPHENOXYLATE HCL/ATROPINE 2.5-.025MG
2 TABLET ORAL
Qty: 0 | Refills: 0 | DISCHARGE

## 2022-07-20 RX ORDER — DOCUSATE SODIUM 100 MG
1 CAPSULE ORAL
Qty: 0 | Refills: 0 | DISCHARGE

## 2022-07-20 RX ORDER — ACETAMINOPHEN 500 MG
650 TABLET ORAL EVERY 6 HOURS
Refills: 0 | Status: DISCONTINUED | OUTPATIENT
Start: 2022-07-20 | End: 2022-07-21

## 2022-07-20 RX ORDER — LINEZOLID 600 MG/300ML
600 INJECTION, SOLUTION INTRAVENOUS EVERY 12 HOURS
Refills: 0 | Status: DISCONTINUED | OUTPATIENT
Start: 2022-07-20 | End: 2022-07-21

## 2022-07-20 RX ORDER — ASPIRIN/CALCIUM CARB/MAGNESIUM 324 MG
1 TABLET ORAL
Qty: 0 | Refills: 0 | DISCHARGE

## 2022-07-20 RX ADMIN — MYCOPHENOLATE MOFETIL 250 MILLIGRAM(S): 250 CAPSULE ORAL at 05:26

## 2022-07-20 RX ADMIN — Medication 1 TABLET(S): at 12:40

## 2022-07-20 RX ADMIN — Medication 100 MILLIGRAM(S): at 12:17

## 2022-07-20 RX ADMIN — Medication 1: at 08:53

## 2022-07-20 RX ADMIN — Medication 5 MILLIGRAM(S): at 05:25

## 2022-07-20 RX ADMIN — TACROLIMUS 4 MILLIGRAM(S): 5 CAPSULE ORAL at 05:25

## 2022-07-20 RX ADMIN — MYCOPHENOLATE MOFETIL 250 MILLIGRAM(S): 250 CAPSULE ORAL at 16:48

## 2022-07-20 RX ADMIN — Medication 30 MILLIGRAM(S): at 05:25

## 2022-07-20 RX ADMIN — Medication 650 MILLIGRAM(S): at 16:55

## 2022-07-20 RX ADMIN — PIPERACILLIN AND TAZOBACTAM 25 GRAM(S): 4; .5 INJECTION, POWDER, LYOPHILIZED, FOR SOLUTION INTRAVENOUS at 12:16

## 2022-07-20 RX ADMIN — TACROLIMUS 1 MILLIGRAM(S): 5 CAPSULE ORAL at 05:26

## 2022-07-20 RX ADMIN — Medication 650 MILLIGRAM(S): at 21:51

## 2022-07-20 RX ADMIN — ATENOLOL 25 MILLIGRAM(S): 25 TABLET ORAL at 05:26

## 2022-07-20 RX ADMIN — LINEZOLID 600 MILLIGRAM(S): 600 INJECTION, SOLUTION INTRAVENOUS at 18:17

## 2022-07-20 RX ADMIN — Medication 650 MILLIGRAM(S): at 22:51

## 2022-07-20 RX ADMIN — TAMSULOSIN HYDROCHLORIDE 0.4 MILLIGRAM(S): 0.4 CAPSULE ORAL at 20:37

## 2022-07-20 RX ADMIN — PIPERACILLIN AND TAZOBACTAM 25 GRAM(S): 4; .5 INJECTION, POWDER, LYOPHILIZED, FOR SOLUTION INTRAVENOUS at 05:25

## 2022-07-20 RX ADMIN — Medication 650 MILLIGRAM(S): at 05:26

## 2022-07-20 RX ADMIN — Medication 1: at 17:31

## 2022-07-20 RX ADMIN — PHENYLEPHRINE-SHARK LIVER OIL-MINERAL OIL-PETROLATUM RECTAL OINTMENT 1 APPLICATION(S): at 17:37

## 2022-07-20 RX ADMIN — PHENYLEPHRINE-SHARK LIVER OIL-MINERAL OIL-PETROLATUM RECTAL OINTMENT 1 APPLICATION(S): at 05:26

## 2022-07-20 NOTE — DISCHARGE NOTE PROVIDER - NSDCMRMEDTOKEN_GEN_ALL_CORE_FT
allopurinol 100 mg oral tablet: 1 tab(s) orally once a day  atenolol 25 mg oral tablet: 1 tab(s) orally once a day AM  Bactrim 400 mg-80 mg oral tablet: 1 tab(s) orally once a day  calcium 600m tab(s) orally 3 times a day  cholestyramine: 4 gram(s) orally once a day  Cytra-2 oral solution: 5 milliliter(s) orally 3 times a day  Envarsus XR 1 mg oral tablet, extended release: 1 tab(s) orally once a day (in the morning)    Note:Last filled in april for 1 month  Envarsus XR 4 mg oral tablet, extended release: 1 tab(s) orally once a day (in the morning) (total dose 5 mg a day in a.m.)  famotidine 20 mg oral tablet: 1 tab(s) orally once a day  folic acid 1 mg oral tablet: 1 tab(s) orally once a day  glimepiride 2 mg oral tablet: 2 tab(s) orally once a day  Januvia 25 mg oral tablet: 1 tab(s) orally once a day AM  lidocaine topical: 1-2 times a day for hemorrhoids  linezolid 600 mg oral tablet: 1 tab(s) orally 2 times a day   magnesium oxide 400 mg oral tablet: 1 tab(s) orally once a day  mycophenolate mofetil 250 mg oral capsule: 1 cap(s) orally 2 times a day    Note:Last filled in march   niacin 500 mg oral tablet: 2 tab(s) orally 2 times a day  NIFEdipine 30 mg oral tablet, extended release: 1 tab(s) orally once a day  predniSONE 5 mg oral tablet: 1 tab(s) orally once a day  sodium bicarbonate 650 mg oral tablet: 1 tab(s) orally 2 times a day  tamsulosin 0.4 mg oral capsule: 2 cap(s) orally once a day  Toprol- mg oral tablet, extended release: 1 tab(s) orally once a day  valGANciclovir 450 mg oral tablet: 1 tab(s) orally 3 times a week Mon,Wed,Fri  Vitamin B-12: 1 tab(s) orally once a day  Vitamin B6: 1 tab(s) orally once a day   allopurinol 100 mg oral tablet: 1 tab(s) orally once a day  atenolol 25 mg oral tablet: 1 tab(s) orally once a day AM  Bactrim 400 mg-80 mg oral tablet: 1 tab(s) orally once a day  calcium 600m tab(s) orally once a day  cholestyramine: 4 gram(s) orally once a day  Cytra-2 oral solution: 5 milliliter(s) orally 3 times a day  Envarsus XR 1 mg oral tablet, extended release: 1 tab(s) orally once a day (in the morning)      Envarsus XR 4 mg oral tablet, extended release: 1 tab(s) orally once a day (in the morning) (total dose 5 mg a day in a.m.)  famotidine 20 mg oral tablet: 1 tab(s) orally once a day  folic acid 1 mg oral tablet: 1 tab(s) orally once a day  glimepiride 2 mg oral tablet: 2 tab(s) orally once a day  Januvia 25 mg oral tablet: 1 tab(s) orally once a day AM  lidocaine topical: 1-2 times a day for hemorrhoids  linezolid 600 mg oral tablet: 1 tab(s) orally 2 times a day   magnesium oxide 400 mg oral tablet: 1 tab(s) orally once a day  mycophenolate mofetil 250 mg oral capsule: 1 cap(s) orally 2 times a day      niacin 500 mg oral tablet: 2 tab(s) orally 2 times a day  NIFEdipine 30 mg oral tablet, extended release: 1 tab(s) orally once a day  predniSONE 5 mg oral tablet: 1 tab(s) orally once a day  sodium bicarbonate 650 mg oral tablet: 1 tab(s) orally 2 times a day  tamsulosin 0.4 mg oral capsule: 2 cap(s) orally once a day  valGANciclovir 450 mg oral tablet: 1 tab(s) orally 3 times a week, Take 1 tab on 22, then go back to taking Mon,Wed,Fri.  Vitamin B-12: 1 tab(s) orally once a day  Vitamin B6: 1 tab(s) orally once a day

## 2022-07-20 NOTE — DISCHARGE NOTE PROVIDER - CARE PROVIDERS DIRECT ADDRESSES
[email or web address]

## 2022-07-20 NOTE — DISCHARGE NOTE PROVIDER - PROVIDER TOKENS
PROVIDER:[TOKEN:[[id number]],SCHEDULEDAPPT:[07/28/2022],SCHEDULEDAPPTTIME:[01:50 PM]],PROVIDER:[TOKEN:[[id number]],SCHEDULEDAPPT:[07/27/2022],SCHEDULEDAPPTTIME:[10:00 AM]] PROVIDER:[TOKEN:[[id number]],SCHEDULEDAPPT:[07/28/2022],SCHEDULEDAPPTTIME:[01:50 PM]],PROVIDER:[TOKEN:[[id number]],SCHEDULEDAPPT:[07/27/2022],SCHEDULEDAPPTTIME:[10:00 AM]],PROVIDER:[TOKEN:[[id number]]]

## 2022-07-20 NOTE — PROGRESS NOTE ADULT - SUBJECTIVE AND OBJECTIVE BOX
Follow Up:  Patient has no complaints today. Denies any diarrhea, n/v, fever or chills, dysuria.     Interval History/ROS:Patient is a 71y old  Male who presents with a chief complaint of Abdominal pain x 2 days (19 Jul 2022 18:05)      REVIEW OF SYSTEMS  Denied fevers, chills, night sweats, rash, cough, coryza, dysuria, loose stools      Allergies  No Known Allergies    ANTIMICROBIALS:    piperacillin/tazobactam IVPB.. 3.375 every 8 hours  trimethoprim   80 mG/sulfamethoxazole 400 mG 1 daily  valGANciclovir 450 <User Schedule>        OTHER MEDS: MEDICATIONS  (STANDING):  allopurinol 100 daily  ATENolol  Tablet 25 daily  dextrose 50% Injectable 25 once  dextrose 50% Injectable 12.5 once  dextrose 50% Injectable 25 once  dextrose Oral Gel 15 once PRN  glucagon  Injectable 1 once  insulin lispro (ADMELOG) corrective regimen sliding scale  three times a day before meals  insulin lispro (ADMELOG) corrective regimen sliding scale  at bedtime  mycophenolate mofetil 250 two times a day  NIFEdipine XL 30 daily  predniSONE   Tablet 5 daily  tacrolimus ER Tablet (ENVARSUS XR) 1 daily  tacrolimus ER Tablet (ENVARSUS XR) 4 daily  tamsulosin 0.4 at bedtime      Vital Signs Last 24 Hrs  T(F): 98.4 (07-20-22 @ 12:17), Max: 99.1 (07-19-22 @ 20:51)    Vital Signs Last 24 Hrs  HR: 60 (07-20-22 @ 12:17) (60 - 65)  BP: 159/80 (07-20-22 @ 12:17) (142/69 - 166/74)  RR: 18 (07-20-22 @ 12:17)  SpO2: 99% (07-20-22 @ 12:17) (98% - 100%)  Wt(kg): --    EXAM:  Constitutional:  well preserved, comfortable  Head/Eyes: no icterus, PERRL, EOMI  ENT:  supple; no thrush  LUNGS:  CTA  CVS:  normal S1, S2, no murmur  Abd:  soft, non-tender; non-distended, no CVA tenderness  : +Velez with yellow urine  Ext:  no edema  Vascular:  IV site no erythema tenderness or discharge  MSK:  joints without swelling  Neuro: AAO X 3, non- focal    Labs:                        11.0   6.86  )-----------( 174      ( 19 Jul 2022 04:41 )             35.1     07-20    136  |  102  |  38<H>  ----------------------------<  125<H>  4.6   |  21<L>  |  2.73<H>    Ca    9.8      20 Jul 2022 04:38        WBC Trend:  WBC Count: 6.86 (07-19-22 @ 04:41)  WBC Count: 6.22 (07-18-22 @ 08:34)  WBC Count: 8.46 (07-16-22 @ 15:03)      Creatine Trend:  Creatinine, Serum: 2.73 (07-20)  Creatinine, Serum: 2.85 (07-19)  Creatinine, Serum: 2.55 (07-18)  Creatinine, Serum: 2.68 (07-16)      Liver Biochemical Testing Trend:  Alanine Aminotransferase (ALT/SGPT): 15 (07-16)  Alanine Aminotransferase (ALT/SGPT): 18 (10-22)  Alanine Aminotransferase (ALT/SGPT): 28 (09-06)  Alanine Aminotransferase (ALT/SGPT): 12 (08-24)  Alanine Aminotransferase (ALT/SGPT): 10 (08-23)  Aspartate Aminotransferase (AST/SGOT): 30 (07-16-22 @ 15:03)  Aspartate Aminotransferase (AST/SGOT): 30 (10-22-21 @ 13:47)  Aspartate Aminotransferase (AST/SGOT): 28 (09-06-21 @ 03:47)  Aspartate Aminotransferase (AST/SGOT): 20 (08-24-21 @ 06:33)  Aspartate Aminotransferase (AST/SGOT): 15 (08-23-21 @ 10:12)  Bilirubin Total, Serum: 0.2 (07-16)  Bilirubin Total, Serum: 0.2 (10-22)  Bilirubin Total, Serum: 0.2 (09-06)  Bilirubin Total, Serum: 0.2 (08-24)  Bilirubin Total, Serum: 0.3 (08-23)      Trend LDH    MICROBIOLOGY:    Culture - Blood (collected 16 Jul 2022 18:30)  Source: .Blood Blood-Venous  Preliminary Report:    No growth to date.    Culture - Blood (collected 16 Jul 2022 18:15)  Source: .Blood Blood-Peripheral  Preliminary Report:    No growth to date.    Culture - Urine (collected 16 Jul 2022 16:00)  Source: Clean Catch Clean Catch (Midstream)  Final Report:    >100,000 CFU/ml Enterococcus faecium  Organism: Enterococcus faecium  Organism: Enterococcus faecium    Sensitivities:      -  Ampicillin: R >8 Predicts results to ampicillin/sulbactam, amoxacillin-clavulanate and  piperacillin-tazobactam.      -  Ciprofloxacin: R >2      -  Levofloxacin: R >4      -  Nitrofurantoin: S <=32 Should not be used to treat pyelonephritis.      -  Tetra/Doxy: R >8      -  Vancomycin: S 1      Method Type: AUTUMN    GI PCR Panel, Stool (collected 24 Aug 2021 13:27)  Source: .Stool Feces  Final Report:    GI PCR Results: NOT detected    *******Please Note:*******    GI panel PCR evaluates for:    Campylobacter, Plesiomonas shigelloides, Salmonella,    Vibrio, Yersinia enterocolitica, Enteroaggregative    Escherichia coli (EAEC), Enteropathogenic E.coli (EPEC),    Enterotoxigenic E. coli (ETEC) lt/st, Shiga-like    toxin-producing E. coli (STEC) stx1/stx2,    Shigella/ Enteroinvasive E. coli (EIEC), Cryptosporidium,    Cyclospora cayetanensis, Entamoeba histolytica,    Giardia lamblia, Adenovirus F 40/41, Astrovirus,    Norovirus GI/GII, Rotavirus A, Sapovirus    Culture - Blood (collected 21 Aug 2021 14:41)  Source: .Blood Blood  Final Report:    No Growth Final    Culture - Blood (collected 20 Aug 2021 20:55)  Source: .Blood Blood-Peripheral  Final Report:    No Growth Final    Culture - Urine (collected 20 Aug 2021 09:02)  Source: Clean Catch Clean Catch (Midstream)  Final Report:    >100,000 CFU/ml Pseudomonas aeruginosa  Organism: Pseudomonas aeruginosa  Organism: Pseudomonas aeruginosa    Sensitivities:      -  Amikacin: S <=16      -  Aztreonam: S 8      -  Cefepime: S 4      -  Ceftazidime: S 4      -  Ciprofloxacin: R >2      -  Gentamicin: S <=2      -  Imipenem: I 4      -  Levofloxacin: R >4      -  Meropenem: I 4      -  Piperacillin/Tazobactam: S <=8      -  Tobramycin: S <=2      Method Type: AUTUMN    GI PCR Panel, Stool (collected 02 Aug 2021 22:32)  Source: .Stool Feces  Final Report:    GI PCR Results: NOT detected    *******Please Note:*******    GI panel PCR evaluates for:    Campylobacter, Plesiomonas shigelloides, Salmonella,    Vibrio, Yersinia enterocolitica, Enteroaggregative    Escherichia coli (EAEC), Enteropathogenic E.coli (EPEC),    Enterotoxigenic E. coli (ETEC) lt/st, Shiga-like    toxin-producing E. coli (STEC) stx1/stx2,    Shigella/ Enteroinvasive E. coli (EIEC), Cryptosporidium,    Cyclospora cayetanensis, Entamoeba histolytica,    Giardia lamblia, Adenovirus F 40/41, Astrovirus,    Norovirus GI/GII, Rotavirus A, Sapovirus    Culture - Urine (collected 01 Aug 2021 09:37)  Source: Clean Catch Clean Catch (Midstream)  Final Report:    >100,000 CFU/ml Pseudomonas aeruginosa    Multiple Morphological Strains  Organism: Pseudomonas aeruginosa  Pseudomonas aeruginosa  Organism: Pseudomonas aeruginosa    Sensitivities:      -  Amikacin: S <=16      -  Aztreonam: I 16      -  Cefepime: S 4      -  Ceftazidime: S 4      -  Ciprofloxacin: R >2      -  Gentamicin: S <=2      -  Imipenem: S 2      -  Levofloxacin: R >4      -  Meropenem: S 2      -  Piperacillin/Tazobactam: S <=8      -  Tobramycin: S <=2      Method Type: AUTUMN  Organism: Pseudomonas aeruginosa    Sensitivities:      -  Amikacin: S <=16      -  Aztreonam: S 8      -  Cefepime: S 4      -  Ceftazidime: S 4      -  Ciprofloxacin: R >2      -  Gentamicin: S <=2      -  Imipenem: I 4      -  Levofloxacin: R >4      -  Meropenem: S <=1      -  Piperacillin/Tazobactam: S <=8      -  Tobramycin: S <=2      Method Type: AUTUMN    Culture - Blood (collected 02 Jun 2021 05:18)  Source: .Blood Blood-Venous  Final Report:    No Growth Final    HIV-1 RNA Quantitative, Viral Load:   NOT DET. (08-15-18 @ 14:37)  HIV-1 Viral Load Result: NOT DET. (08-15-18 @ 14:37)    COVID-19 Dewayne Domain AB Interp: Positive (08-21-21 @ 09:20)  COVID-19 [facility]ay[facility] Domain AB Interp: Positive (08-02-21 @ 13:47)    RADIOLOGY:  imaging below personally reviewed

## 2022-07-20 NOTE — DISCHARGE NOTE PROVIDER - NSDCFUSCHEDAPPT_GEN_ALL_CORE_FT
John Phillip  [facility]  UROLOGY [address] [facility]  Scheduled Appointment: 07/28/2022    Marito Napoles  [facility]  [address]  Scheduled Appointment: 08/09/2022    Arian Bhakta  [facility]  NEPHRO [address] [place] [address]  Scheduled Appointment: 08/11/2022    Marito Napoles  [facility]  [address]  Scheduled Appointment: 08/16/2022

## 2022-07-20 NOTE — PROGRESS NOTE ADULT - PROVIDER SPECIALTY LIST ADULT
Transplant ID
Hospitalist
Transplant ID
Hospitalist
Transplant ID
Transplant Nephrology
Urology
Transplant Nephrology

## 2022-07-20 NOTE — PROGRESS NOTE ADULT - REASON FOR ADMISSION
Abdominal pain x 2 days

## 2022-07-20 NOTE — DISCHARGE NOTE PROVIDER - NSDCCPCAREPLAN_GEN_ALL_CORE_FT
PRINCIPAL DISCHARGE DIAGNOSIS  Diagnosis: Urinary retention  Assessment and Plan of Treatment: Status post armenta catheter placement, exchanged on 7/20.   Positive for UTI with positive urine cultures. treated with Iv antibiotics inpatient and switched to oral antibiotics on discharge for 1 more week. Make sure to follow up outpatient with Infectioud disease and urology.        SECONDARY DISCHARGE DIAGNOSES  Diagnosis: Kidney transplant recipient  Assessment and Plan of Treatment: continue your usual medications and follow routinely with your nephrologist    Diagnosis: Acute kidney injury superimposed on CKD  Assessment and Plan of Treatment:      PRINCIPAL DISCHARGE DIAGNOSIS  Diagnosis: Urinary retention  Assessment and Plan of Treatment: Status post armenta catheter placement, exchanged on 7/20.   Positive for UTI with positive urine cultures. treated with Iv antibiotics inpatient and switched to oral antibiotics on discharge for 1 more week. Make sure to follow up outpatient with Infectioud disease and urology.        SECONDARY DISCHARGE DIAGNOSES  Diagnosis: Kidney transplant recipient  Assessment and Plan of Treatment: continue your usual medications and follow routinely with your nephrologist. BEcause you came into the hospital on sunday, the valganciclovir was started here on Sunday and continued to be given to you every other day (T/Th), continue to take it on saturday and then can go back to M/W/F schedule starting 7/25    Diagnosis: Acute kidney injury superimposed on CKD  Assessment and Plan of Treatment:      PRINCIPAL DISCHARGE DIAGNOSIS  Diagnosis: Urinary retention  Assessment and Plan of Treatment: Status post armenta catheter placement, exchanged on 7/20.   Positive for UTI with positive urine cultures. treated with Iv antibiotics inpatient and switched to oral antibiotics on discharge for 1 more week. Make sure to follow up outpatient with Infectious disease and urology.        SECONDARY DISCHARGE DIAGNOSES  Diagnosis: Acute kidney injury superimposed on CKD  Assessment and Plan of Treatment: Avoid taking (NSAIDs) - (ex: Ibuprofen, Advil, Celebrex, Naprosyn)  Avoid taking any nephrotoxic agents (can harm kidneys) - Intravenous contrast for diagnostic testing, combination cold medications.  Have all medications adjusted for your renal function by your Health Care Provider.  Blood pressure control is important.  Take all medication as prescribed.      Diagnosis: Kidney transplant recipient  Assessment and Plan of Treatment: continue your usual medications and follow routinely with your nephrologist. BEcause you came into the hospital on sunday, the valganciclovir was started here on Sunday and continued to be given to you every other day (T/Th), continue to take it on saturday and then can go back to M/W/F schedule starting 7/25

## 2022-07-20 NOTE — DISCHARGE NOTE PROVIDER - HOSPITAL COURSE
# Acute Pyelonephritis CT showed moderate hydro to transplant kidney, cystitis and air in the bladder/transplant kidney from instrumentation vs. infection.     IV Zosyn inpatient. urine cx + : sent linezolid for 1 week outpatient   Blood cx no growth   ID following     # Ureteral stricture Transplant [facility] consult apprecaited   Plan for non urgent stent exchange    Velez for now       # Kidney Transplant Transplant team consult appreciated   Continue with Tacrolimus, Cell cept   Monitor Tacro levels     patient to follow up with Urology and ID in 1 week

## 2022-07-20 NOTE — DISCHARGE NOTE PROVIDER - CARE PROVIDER_API CALL
John Phillip)  Urology  [address] [facility], [address]  [place] [address]  Phone: [phone]  Fax: [phone]  Scheduled Appointment: 07/28/2022 01:50 PM    Tamar Bay)  Infectious Disease; Internal Medicine  [address] [place] [address]  Phone: [phone]  Fax: [phone]  Scheduled Appointment: 07/27/2022 10:00 AM   John Phillip)  Urology  [address] [facility], [address]  [place] [address]  Phone: [phone]  Fax: [phone]  Scheduled Appointment: 07/28/2022 01:50 PM    Tamar Bay)  Infectious Disease; Internal Medicine  [address] [place] [address]  Phone: [phone]  Fax: [phone]  Scheduled Appointment: 07/27/2022 10:00 AM    Arian Bhakta  NEPHROLOGY  [address] [place] [address]  Phone: [phone]  Fax: [phone]  Follow Up Time:

## 2022-07-20 NOTE — PROGRESS NOTE ADULT - ASSESSMENT
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     #Urinary Retention  #Positive UA  - afebrile, without symptoms   - UA with pyuria and rbc, +LE, without nitrite, bacteria  - UCx (7/16) >100,000 E faecium R to Amp/Zosyn, S to vanco  - BCx (7/16) NGTD  - patient has been afebrile without leukocytosis while on zosyn x5 days today (started 7/16)  - recommend d/c zosyn  - monitor for fever, trend WBC  - Urology regarding stent and urinary retention    #S/p DDRT in 2018  - Giovanny creatinine 1.9 but recently 2.7-3mg/dL  - Graft function relatively stable  - c/w Bactrim ppx    PT TO BE SEEN. PRELIM NOTE  PENDING RECS. PLEASE WAIT FOR FINAL RECS AFTER DISCUSSION WITH ATTENDINGZachary Inman DO, PGY-4   ID fellow  [employer] Teams Preferred  After 5pm/weekends call [phone]   Prior Cultures  Urine Cx Pseudomonas (zosyn sen), E.cloacae (Zosyn sen)and more recently E.faecium (Vanc sensitive)    DIAGNOSIS and IMPRESSION  71M s/p DDRT in 7/2018 with chronic graft dysfunction with baseline creatinine of 2.7-3mg/dL, history of transplant ureteric stricture with stent exchanges last in 11/2021, h/o BPH s/p TURP, Prostate cancer completed RT in 8/2021,  admitted with urine retention and UTI.     #Urinary Retention  #Positive UA  - afebrile, without symptoms   - UA with pyuria and rbc, +LE, without nitrite, bacteria  - UCx (7/16) >100,000 E faecium R to Amp/Zosyn, S to vanco  - BCx (7/16) NGTD  - s/p zosyn x5 days today (started 7/16)  - can be discharged on Linezolid 600mg BID for 1 week, followup with ID outpatient    #S/p DDRT in 2018  - Giovanny creatinine 1.9 but recently 2.7-3mg/dL  - Graft function relatively stable  - c/w Bactrim ppx    PT TO BE SEEN. PRELIM NOTE  PENDING RECS. PLEASE WAIT FOR FINAL RECS AFTER DISCUSSION WITH ATTENDINGZachary Inman DO, PGY-4   ID fellow  [employer] Teams Preferred  After 5pm/weekends call [phone]

## 2022-07-20 NOTE — DISCHARGE NOTE PROVIDER - NSDCFUADDAPPT_GEN_ALL_CORE_FT
Please call both the infectious disease office and the urology office to confirm your appointments. Please call both the infectious disease office and the urology office to confirm your appointments.    Follow up with Transplant Nephrologist, Dr. Bhakta as your scheduled appointment.     Follow up with Dr. Phillip for follow up outpatient with urology for evaluation of retention/overflow incontinence.

## 2022-07-20 NOTE — PROGRESS NOTE ADULT - ASSESSMENT
# Acute Pyelonephritis CT showed moderate hydro to transplant kidney, cystitis and air in the bladder/transplant kidney from instrumentation vs. infection.     IV Zosyn Follow up Urine  Blood cx no growth   ID following   D/C planning     # Ureteral stricture Transplant [facility] Urology consult apprecaited   Plan for non urgent stent exchange    Velez for now       # Kidney Transplant Transplant team consult appreciated   Continue with Tacrolimus, Cell cept   Monitor Tacro levels

## 2022-07-20 NOTE — PROGRESS NOTE ADULT - ATTENDING COMMENTS
Patient seen and examined    Case discussed with Dr Inman    I agree with his interval history exam and plans as noted above    Follow up urine cultures  IF negative would observe off antibiotics    Nate Bay MD  Can be called via Teams  After 5pm/weekends [phone]
Patient seen and examined    Case discussed with Dr Inman    I agree with his interval history exam and plans as noted above  stable at this point  plans to discharge with armenta in place and follow up with Urology  ID out patient follow up in 1-2 weeks  discharge on oral linezolid x 7days    Nate Bay MD  Can be called via Teams  After 5pm/weekends [phone]
Patient seen and examined with Dr Inman    I agree with his interval history exam and plans as noted above    Patient feeling well  Urine culture pending  Would continue current antibiotics until urine culture is finalized    Nate Bay MD  Can be called via Teams  After 5pm/weekends [phone]
Pt admitted with UTI and urinary retention now s/p armenta catheter.    Has chronic indwelling ureteral stent followed by Dr. Phillip.  Urology following, right now plan for outpatient exchange.    Creatinine higher today but still around baseline, tacro trough at goal today - no changes.  Stop valcyte
Pt admitted with UTI and urinary retention now s/p armenta catheter.    Has chronic indwelling ureteral stent followed by Dr. Phillip.  Will discuss stent exchange with him.   Creatinine at baseline, tacro trough low from yesterday, please increase to 6mgs daily from tomorrow.

## 2022-07-21 ENCOUNTER — TRANSCRIPTION ENCOUNTER (OUTPATIENT)
Age: 72
End: 2022-07-21

## 2022-07-21 VITALS
SYSTOLIC BLOOD PRESSURE: 127 MMHG | HEART RATE: 58 BPM | RESPIRATION RATE: 18 BRPM | TEMPERATURE: 98 F | DIASTOLIC BLOOD PRESSURE: 69 MMHG | OXYGEN SATURATION: 97 %

## 2022-07-21 LAB
ANION GAP SERPL CALC-SCNC: 12 MMOL/L — SIGNIFICANT CHANGE UP (ref 5–17)
BUN SERPL-MCNC: 39 MG/DL — HIGH (ref 7–23)
CALCIUM SERPL-MCNC: 9.9 MG/DL — SIGNIFICANT CHANGE UP (ref 8.4–10.5)
CHLORIDE SERPL-SCNC: 104 MMOL/L — SIGNIFICANT CHANGE UP (ref 96–108)
CO2 SERPL-SCNC: 21 MMOL/L — LOW (ref 22–31)
CREAT SERPL-MCNC: 2.76 MG/DL — HIGH (ref 0.5–1.3)
EGFR: 24 ML/MIN/1.73M2 — LOW
GLUCOSE BLDC GLUCOMTR-MCNC: 152 MG/DL — HIGH (ref 70–99)
GLUCOSE BLDC GLUCOMTR-MCNC: 196 MG/DL — HIGH (ref 70–99)
GLUCOSE SERPL-MCNC: 115 MG/DL — HIGH (ref 70–99)
HCT VFR BLD CALC: 35.4 % — LOW (ref 39–50)
HGB BLD-MCNC: 10.8 G/DL — LOW (ref 13–17)
MCHC RBC-ENTMCNC: 28.6 PG — SIGNIFICANT CHANGE UP (ref 27–34)
MCHC RBC-ENTMCNC: 30.5 GM/DL — LOW (ref 32–36)
MCV RBC AUTO: 93.7 FL — SIGNIFICANT CHANGE UP (ref 80–100)
NRBC # BLD: 0 /100 WBCS — SIGNIFICANT CHANGE UP (ref 0–0)
PLATELET # BLD AUTO: 185 K/UL — SIGNIFICANT CHANGE UP (ref 150–400)
POTASSIUM SERPL-MCNC: 4.6 MMOL/L — SIGNIFICANT CHANGE UP (ref 3.5–5.3)
POTASSIUM SERPL-SCNC: 4.6 MMOL/L — SIGNIFICANT CHANGE UP (ref 3.5–5.3)
RBC # BLD: 3.78 M/UL — LOW (ref 4.2–5.8)
RBC # FLD: 15.6 % — HIGH (ref 10.3–14.5)
SODIUM SERPL-SCNC: 137 MMOL/L — SIGNIFICANT CHANGE UP (ref 135–145)
TACROLIMUS SERPL-MCNC: 4.6 NG/ML — SIGNIFICANT CHANGE UP
WBC # BLD: 5.49 K/UL — SIGNIFICANT CHANGE UP (ref 3.8–10.5)
WBC # FLD AUTO: 5.49 K/UL — SIGNIFICANT CHANGE UP (ref 3.8–10.5)

## 2022-07-21 PROCEDURE — 82947 ASSAY GLUCOSE BLOOD QUANT: CPT

## 2022-07-21 PROCEDURE — 83605 ASSAY OF LACTIC ACID: CPT

## 2022-07-21 PROCEDURE — 76776 US EXAM K TRANSPL W/DOPPLER: CPT

## 2022-07-21 PROCEDURE — 87186 SC STD MICRODIL/AGAR DIL: CPT

## 2022-07-21 PROCEDURE — 85018 HEMOGLOBIN: CPT

## 2022-07-21 PROCEDURE — 71045 X-RAY EXAM CHEST 1 VIEW: CPT

## 2022-07-21 PROCEDURE — 99285 EMERGENCY DEPT VISIT HI MDM: CPT | Mod: 25

## 2022-07-21 PROCEDURE — 85025 COMPLETE CBC W/AUTO DIFF WBC: CPT

## 2022-07-21 PROCEDURE — 82803 BLOOD GASES ANY COMBINATION: CPT

## 2022-07-21 PROCEDURE — 82330 ASSAY OF CALCIUM: CPT

## 2022-07-21 PROCEDURE — 82962 GLUCOSE BLOOD TEST: CPT

## 2022-07-21 PROCEDURE — 36415 COLL VENOUS BLD VENIPUNCTURE: CPT

## 2022-07-21 PROCEDURE — 80053 COMPREHEN METABOLIC PANEL: CPT

## 2022-07-21 PROCEDURE — 85730 THROMBOPLASTIN TIME PARTIAL: CPT

## 2022-07-21 PROCEDURE — 85610 PROTHROMBIN TIME: CPT

## 2022-07-21 PROCEDURE — 80197 ASSAY OF TACROLIMUS: CPT

## 2022-07-21 PROCEDURE — 87086 URINE CULTURE/COLONY COUNT: CPT

## 2022-07-21 PROCEDURE — 83690 ASSAY OF LIPASE: CPT

## 2022-07-21 PROCEDURE — 82435 ASSAY OF BLOOD CHLORIDE: CPT

## 2022-07-21 PROCEDURE — 85027 COMPLETE CBC AUTOMATED: CPT

## 2022-07-21 PROCEDURE — 74176 CT ABD & PELVIS W/O CONTRAST: CPT | Mod: MA

## 2022-07-21 PROCEDURE — 81001 URINALYSIS AUTO W/SCOPE: CPT

## 2022-07-21 PROCEDURE — 96374 THER/PROPH/DIAG INJ IV PUSH: CPT

## 2022-07-21 PROCEDURE — 87040 BLOOD CULTURE FOR BACTERIA: CPT

## 2022-07-21 PROCEDURE — 84295 ASSAY OF SERUM SODIUM: CPT

## 2022-07-21 PROCEDURE — 87637 SARSCOV2&INF A&B&RSV AMP PRB: CPT

## 2022-07-21 PROCEDURE — 85014 HEMATOCRIT: CPT

## 2022-07-21 PROCEDURE — 80048 BASIC METABOLIC PNL TOTAL CA: CPT

## 2022-07-21 PROCEDURE — 84132 ASSAY OF SERUM POTASSIUM: CPT

## 2022-07-21 RX ORDER — MYCOPHENOLATE MOFETIL 250 MG/1
1 CAPSULE ORAL
Qty: 0 | Refills: 0 | DISCHARGE

## 2022-07-21 RX ORDER — GLIMEPIRIDE 1 MG
2 TABLET ORAL
Qty: 0 | Refills: 0 | DISCHARGE

## 2022-07-21 RX ORDER — TACROLIMUS 5 MG/1
1 CAPSULE ORAL
Qty: 0 | Refills: 0 | DISCHARGE

## 2022-07-21 RX ORDER — ATENOLOL 25 MG/1
1 TABLET ORAL
Qty: 0 | Refills: 0 | DISCHARGE

## 2022-07-21 RX ORDER — LINEZOLID 600 MG/300ML
1 INJECTION, SOLUTION INTRAVENOUS
Qty: 12 | Refills: 0
Start: 2022-07-21 | End: 2022-07-26

## 2022-07-21 RX ORDER — FAMOTIDINE 10 MG/ML
1 INJECTION INTRAVENOUS
Qty: 0 | Refills: 0 | DISCHARGE

## 2022-07-21 RX ORDER — VALGANCICLOVIR 450 MG/1
1 TABLET, FILM COATED ORAL
Qty: 0 | Refills: 0 | DISCHARGE

## 2022-07-21 RX ORDER — SODIUM BICARBONATE 1 MEQ/ML
1 SYRINGE (ML) INTRAVENOUS
Qty: 0 | Refills: 0 | DISCHARGE

## 2022-07-21 RX ORDER — METOPROLOL TARTRATE 50 MG
1 TABLET ORAL
Qty: 0 | Refills: 0 | DISCHARGE

## 2022-07-21 RX ORDER — NIACIN 50 MG
2 TABLET ORAL
Qty: 0 | Refills: 0 | DISCHARGE

## 2022-07-21 RX ADMIN — Medication 5 MILLIGRAM(S): at 05:28

## 2022-07-21 RX ADMIN — ATENOLOL 25 MILLIGRAM(S): 25 TABLET ORAL at 05:29

## 2022-07-21 RX ADMIN — Medication 1: at 08:19

## 2022-07-21 RX ADMIN — Medication 1 TABLET(S): at 11:29

## 2022-07-21 RX ADMIN — LINEZOLID 600 MILLIGRAM(S): 600 INJECTION, SOLUTION INTRAVENOUS at 05:29

## 2022-07-21 RX ADMIN — PHENYLEPHRINE-SHARK LIVER OIL-MINERAL OIL-PETROLATUM RECTAL OINTMENT 1 APPLICATION(S): at 05:30

## 2022-07-21 RX ADMIN — MYCOPHENOLATE MOFETIL 250 MILLIGRAM(S): 250 CAPSULE ORAL at 05:28

## 2022-07-21 RX ADMIN — Medication 100 MILLIGRAM(S): at 11:29

## 2022-07-21 RX ADMIN — Medication 1: at 12:06

## 2022-07-21 RX ADMIN — TACROLIMUS 4 MILLIGRAM(S): 5 CAPSULE ORAL at 05:29

## 2022-07-21 RX ADMIN — Medication 650 MILLIGRAM(S): at 05:28

## 2022-07-21 RX ADMIN — TACROLIMUS 1 MILLIGRAM(S): 5 CAPSULE ORAL at 05:29

## 2022-07-21 RX ADMIN — Medication 30 MILLIGRAM(S): at 05:29

## 2022-07-21 RX ADMIN — VALGANCICLOVIR 450 MILLIGRAM(S): 450 TABLET, FILM COATED ORAL at 05:29

## 2022-07-21 NOTE — PHARMACOTHERAPY INTERVENTION NOTE - COMMENTS
Counseled patient on discharge medication doses, indications, and possible side effects, with a focus on changes to home regimen. Answered all of the patient's questions to the best of my ability. Patient exhibited understanding of discharge medication regimen. Linezolid coverage confirmed with [facility] pharmacy, covered for $15 and in stock for today.    Delma Roca, PharmD, [facility]  Clinical Pharmacy Specialist  [phone] or Teams  Counseled patient on discharge medication doses, indications, and possible side effects, with a focus on changes to home regimen. Answered all of the patient's questions to the best of my ability. Patient exhibited understanding of discharge medication regimen. Linezolid coverage confirmed with "[facility] pharmacy, covered for $15 and in stock for today. Patient confirmed that he is on cholestyramine at home (prescribed by his GI doctor), advised patient to consult with his transplant team as cholestyramine can potentially reduce the efficacy of mycophenolate - patient exhibited understanding.    Delma Roca, PharmD, [facility]  Clinical Pharmacy Specialist  [phone] or Teams

## 2022-07-21 NOTE — DISCHARGE NOTE NURSING/CASE MANAGEMENT/SOCIAL WORK - PATIENT PORTAL LINK FT
You can access the FollowMyHealth Patient Portal offered by [facility] by registering at the following website: [email or web address]. By joining "[employer]’s FollowMyHealth portal, you will also be able to view your health information using other applications (apps) compatible with our system.

## 2022-07-21 NOTE — DISCHARGE NOTE NURSING/CASE MANAGEMENT/SOCIAL WORK - NSDCVIVACCINE_GEN_ALL_CORE_FT
influenza, injectable, quadrivalent, preservative free; 15-Sep-2016 13:35; Zulay Diego (RN); [employer]; [id number]; IntraMuscular; Deltoid Right.; 0.5 milliLiter(s); VIS (VIS Published: 07-Aug-2015, VIS Presented: 15-Sep-2016);   influenza, high-dose, quadrivalent; 09-Nov-2021 15:51; Anna Taylor (RN); [employer]; [id number] (Exp. Date: 30-Jun-2022); IntraMuscular; Deltoid Right.; 0.7 milliLiter(s); VIS (VIS Published: 06-Aug-2021, VIS Presented: 09-Nov-2021);

## 2022-07-21 NOTE — DISCHARGE NOTE NURSING/CASE MANAGEMENT/SOCIAL WORK - NSDCPEFALRISK_GEN_ALL_CORE
For information on Fall & Injury Prevention, visit: [email or web address] OR  [email or web address] OR  https://www.cdc.gov/steadi/patient.html

## 2022-07-22 ENCOUNTER — TRANSCRIPTION ENCOUNTER (OUTPATIENT)
Age: 72
End: 2022-07-22

## 2022-07-23 ENCOUNTER — EMERGENCY (EMERGENCY)
Facility: HOSPITAL | Age: 72
LOS: 1 days | Discharge: HOME | End: 2022-07-23
Attending: EMERGENCY MEDICINE
Payer: MEDICARE

## 2022-07-23 VITALS
SYSTOLIC BLOOD PRESSURE: 126 MMHG | DIASTOLIC BLOOD PRESSURE: 69 MMHG | HEIGHT: 70 IN | HEART RATE: 85 BPM | WEIGHT: 207.9 LBS | RESPIRATION RATE: 18 BRPM | OXYGEN SATURATION: 99 % | TEMPERATURE: 98 F

## 2022-07-23 VITALS
OXYGEN SATURATION: 100 % | TEMPERATURE: 98 F | RESPIRATION RATE: 18 BRPM | SYSTOLIC BLOOD PRESSURE: 147 MMHG | HEART RATE: 71 BPM | DIASTOLIC BLOOD PRESSURE: 73 MMHG

## 2022-07-23 DIAGNOSIS — Z98.890 OTHER SPECIFIED POSTPROCEDURAL STATES: Chronic | ICD-10-CM

## 2022-07-23 DIAGNOSIS — Z90.5 ACQUIRED ABSENCE OF KIDNEY: Chronic | ICD-10-CM

## 2022-07-23 DIAGNOSIS — Z90.79 ACQUIRED ABSENCE OF OTHER GENITAL ORGAN(S): Chronic | ICD-10-CM

## 2022-07-23 DIAGNOSIS — I77.0 ARTERIOVENOUS FISTULA, ACQUIRED: Chronic | ICD-10-CM

## 2022-07-23 DIAGNOSIS — Z94.0 KIDNEY TRANSPLANT STATUS: Chronic | ICD-10-CM

## 2022-07-23 LAB
ALBUMIN SERPL ELPH-MCNC: 4 G/DL — SIGNIFICANT CHANGE UP (ref 3.3–5)
ALP SERPL-CCNC: 82 U/L — SIGNIFICANT CHANGE UP (ref 40–120)
ALT FLD-CCNC: 15 U/L — SIGNIFICANT CHANGE UP (ref 10–45)
ANION GAP SERPL CALC-SCNC: 12 MMOL/L — SIGNIFICANT CHANGE UP (ref 5–17)
APPEARANCE UR: ABNORMAL
AST SERPL-CCNC: 22 U/L — SIGNIFICANT CHANGE UP (ref 10–40)
BACTERIA # UR AUTO: NEGATIVE — SIGNIFICANT CHANGE UP
BASOPHILS # BLD AUTO: 0.02 K/UL — SIGNIFICANT CHANGE UP (ref 0–0.2)
BASOPHILS NFR BLD AUTO: 0.3 % — SIGNIFICANT CHANGE UP (ref 0–2)
BILIRUB SERPL-MCNC: 0.2 MG/DL — SIGNIFICANT CHANGE UP (ref 0.2–1.2)
BILIRUB UR-MCNC: NEGATIVE — SIGNIFICANT CHANGE UP
BUN SERPL-MCNC: 39 MG/DL — HIGH (ref 7–23)
CALCIUM SERPL-MCNC: 10.3 MG/DL — SIGNIFICANT CHANGE UP (ref 8.4–10.5)
CHLORIDE SERPL-SCNC: 105 MMOL/L — SIGNIFICANT CHANGE UP (ref 96–108)
CO2 SERPL-SCNC: 21 MMOL/L — LOW (ref 22–31)
COLOR SPEC: YELLOW — SIGNIFICANT CHANGE UP
CREAT SERPL-MCNC: 3.38 MG/DL — HIGH (ref 0.5–1.3)
DIFF PNL FLD: ABNORMAL
EGFR: 19 ML/MIN/1.73M2 — LOW
EOSINOPHIL # BLD AUTO: 0.01 K/UL — SIGNIFICANT CHANGE UP (ref 0–0.5)
EOSINOPHIL NFR BLD AUTO: 0.1 % — SIGNIFICANT CHANGE UP (ref 0–6)
EPI CELLS # UR: 2 /HPF — SIGNIFICANT CHANGE UP
GLUCOSE SERPL-MCNC: 177 MG/DL — HIGH (ref 70–99)
GLUCOSE UR QL: NEGATIVE — SIGNIFICANT CHANGE UP
HCT VFR BLD CALC: 32.7 % — LOW (ref 39–50)
HGB BLD-MCNC: 10.2 G/DL — LOW (ref 13–17)
HYALINE CASTS # UR AUTO: 2 /LPF — SIGNIFICANT CHANGE UP (ref 0–2)
IMM GRANULOCYTES NFR BLD AUTO: 0.3 % — SIGNIFICANT CHANGE UP (ref 0–1.5)
KETONES UR-MCNC: NEGATIVE — SIGNIFICANT CHANGE UP
LEUKOCYTE ESTERASE UR-ACNC: ABNORMAL
LYMPHOCYTES # BLD AUTO: 1.88 K/UL — SIGNIFICANT CHANGE UP (ref 1–3.3)
LYMPHOCYTES # BLD AUTO: 25.1 % — SIGNIFICANT CHANGE UP (ref 13–44)
MCHC RBC-ENTMCNC: 28.6 PG — SIGNIFICANT CHANGE UP (ref 27–34)
MCHC RBC-ENTMCNC: 31.2 GM/DL — LOW (ref 32–36)
MCV RBC AUTO: 91.6 FL — SIGNIFICANT CHANGE UP (ref 80–100)
MONOCYTES # BLD AUTO: 0.29 K/UL — SIGNIFICANT CHANGE UP (ref 0–0.9)
MONOCYTES NFR BLD AUTO: 3.9 % — SIGNIFICANT CHANGE UP (ref 2–14)
NEUTROPHILS # BLD AUTO: 5.26 K/UL — SIGNIFICANT CHANGE UP (ref 1.8–7.4)
NEUTROPHILS NFR BLD AUTO: 70.3 % — SIGNIFICANT CHANGE UP (ref 43–77)
NITRITE UR-MCNC: NEGATIVE — SIGNIFICANT CHANGE UP
NRBC # BLD: 0 /100 WBCS — SIGNIFICANT CHANGE UP (ref 0–0)
PH UR: 6 — SIGNIFICANT CHANGE UP (ref 5–8)
PLATELET # BLD AUTO: 195 K/UL — SIGNIFICANT CHANGE UP (ref 150–400)
POTASSIUM SERPL-MCNC: 5.2 MMOL/L — SIGNIFICANT CHANGE UP (ref 3.5–5.3)
POTASSIUM SERPL-SCNC: 5.2 MMOL/L — SIGNIFICANT CHANGE UP (ref 3.5–5.3)
PROT SERPL-MCNC: 7.3 G/DL — SIGNIFICANT CHANGE UP (ref 6–8.3)
PROT UR-MCNC: ABNORMAL
RBC # BLD: 3.57 M/UL — LOW (ref 4.2–5.8)
RBC # FLD: 15.5 % — HIGH (ref 10.3–14.5)
RBC CASTS # UR COMP ASSIST: 197 /HPF — HIGH (ref 0–4)
SODIUM SERPL-SCNC: 138 MMOL/L — SIGNIFICANT CHANGE UP (ref 135–145)
SP GR SPEC: 1.02 — SIGNIFICANT CHANGE UP (ref 1.01–1.02)
UROBILINOGEN FLD QL: NEGATIVE — SIGNIFICANT CHANGE UP
WBC # BLD: 7.48 K/UL — SIGNIFICANT CHANGE UP (ref 3.8–10.5)
WBC # FLD AUTO: 7.48 K/UL — SIGNIFICANT CHANGE UP (ref 3.8–10.5)
WBC UR QL: 87 /HPF — HIGH (ref 0–5)

## 2022-07-23 PROCEDURE — 99284 EMERGENCY DEPT VISIT MOD MDM: CPT | Mod: 25

## 2022-07-23 PROCEDURE — 81001 URINALYSIS AUTO W/SCOPE: CPT

## 2022-07-23 PROCEDURE — 80053 COMPREHEN METABOLIC PANEL: CPT

## 2022-07-23 PROCEDURE — 87086 URINE CULTURE/COLONY COUNT: CPT

## 2022-07-23 PROCEDURE — 36000 PLACE NEEDLE IN VEIN: CPT

## 2022-07-23 PROCEDURE — 51702 INSERT TEMP BLADDER CATH: CPT

## 2022-07-23 PROCEDURE — 99284 EMERGENCY DEPT VISIT MOD MDM: CPT

## 2022-07-23 PROCEDURE — 85025 COMPLETE CBC W/AUTO DIFF WBC: CPT

## 2022-07-23 RX ORDER — NITROFURANTOIN MACROCRYSTAL 50 MG
1 CAPSULE ORAL
Qty: 14 | Refills: 0
Start: 2022-07-23 | End: 2022-07-29

## 2022-07-23 RX ORDER — NITROFURANTOIN MACROCRYSTAL 50 MG
100 CAPSULE ORAL ONCE
Refills: 0 | Status: COMPLETED | OUTPATIENT
Start: 2022-07-23 | End: 2022-07-23

## 2022-07-23 RX ADMIN — Medication 100 MILLIGRAM(S): at 16:12

## 2022-07-23 NOTE — ED ADULT NURSE NOTE - OBJECTIVE STATEMENT
70 YO male with PMH of prostate cancer, BPH, ESRD, DM, & HTN  via walk in presenting with complaints of draining issues with Armenta catheter. Pt states getting Armenta put in on TH and yesterday it started draining less/leakage around catheter  and at 12am drainage stopped completely. Pt states having penile pain/discomfort "tingling" and chronic anus pain. Pt Axox4 respirations even, & non-labored. Radial pulses strong and equal bilaterally. Abdomen soft, tender at super pubic area, states feeling like full bladder, and distended. Small amount or cloudy urine in armenta, Skin warm, dry, and intact. Pt denies chest pain, palpitations, shortness of breath, headache, visual disturbances,  fever, chills, diaphoresis, blood in urine, nausea, or vomiting. Pt placed in position of comfort. Pt educated on call bell system and provided call bell. Bed in lowest position, wheels locked, appropriate side rails raised. Pt denies needs at this time.

## 2022-07-23 NOTE — ED PROVIDER NOTE - PATIENT PORTAL LINK FT
You can access the FollowMyHealth Patient Portal offered by [facility] by registering at the following website: [email or web address]. By joining [employer]’s FollowMyHealth portal, you will also be able to view your health information using other applications (apps) compatible with our system.

## 2022-07-23 NOTE — ED PROVIDER NOTE - NSICDXPASTSURGICALHX_GEN_ALL_CORE_FT
PAST SURGICAL HISTORY:  AV fistula 2013/ left forearm    H/O colonoscopy     H/O ileostomy '    for 3 months. s/p Reversal    Kidney transplant recipient 2018  @ [facility] :  +  Hep C Donor    S/P anal fissurectomy and chemical denervation  with biopsy and fulguration of anal lesions, 2021    S/p nephrectomy left 9/15/2015   + Cancer    S/p nephrectomy right 12/10/2015   benign    S/P right knee arthroscopy     S/P TURP (transurethral resection of prostate) and Transplant Kidney Stent Replacemernt, 21

## 2022-07-23 NOTE — ED ADULT NURSE REASSESSMENT NOTE - NS ED NURSE REASSESS COMMENT FT1
Velez irrigation attempted by ED RN as requested by ED Resident Lili. Able to flush 60 cc of sterile water with no complication and pt tolerated well, but was not able to remove the 60 cc urine. ED Resident Lili made aware- urology to be consulted.

## 2022-07-23 NOTE — ED ADULT NURSE REASSESSMENT NOTE - NS ED NURSE REASSESS COMMENT FT1
Indwelling 18 Ivorian urinary armenta catheter inserted using sterile technique. Procedure, risks, and benefits of catheter explained to patient, patient verbalized understanding. Second RN present to confirm sterility. Pt tolerated well. Urinary catheter drained cloudy, no clots visualized. Bedside drainage to gravity. Stat lock in place.

## 2022-07-23 NOTE — ED ADULT NURSE REASSESSMENT NOTE - NS ED NURSE REASSESS COMMENT FT1
Velez bag was switched out for a leg bag, education was provided on usage on how to drain, and attach to leg. Pt verbalized understanding. No further RN interventions are needed at this time.

## 2022-07-23 NOTE — ED PROVIDER NOTE - NSFOLLOWUPINSTRUCTIONS_ED_ALL_ED_FT
You were seen in the ED for urinary retention, as your armenta catheter was not draining urine. Your catheter was replaced and drained urine well.    1) Follow up with your transplant doctor within 1 week, and keep any follow up appointments with ID or other specialists that you have.  2) Return to the ED immediately for new or worsening symptoms. This may include fever, chills, urinary retention (armenta catheter does not produce urine), blood in your urine, new severe abdominal or back pain, abdominal swelling.  3) Your antibiotic for your urinary tract infection was changed from Linezolid to Nitrofurantoin. Please take one pill of nitrofurantoin (Macrobid) twice per day. Please continue to take other home medications as prescribed.  4) Your test results from your ED visit were discussed with you prior to discharge.          WHAT YOU NEED TO KNOW:    What is a urinary tract infection (UTI)? A UTI is caused by bacteria that get inside your urinary tract. Your urinary tract includes your kidneys, ureters, bladder, and urethra. A UTI is more common in your lower urinary tract, which includes your bladder and urethra.  Kidney, Ureters, Bladder         What increases my risk for a UTI?   •A UTI within the last 3 months      •Menopause in women      •Enlarged prostate in men      •Medicines that affect urination      •Urinary incontinence (you cannot control your bladder)      •Sexual intercourse      •Medical conditions, such as diabetes or obesity      •Urinary tract problems, such as a narrowing, kidney stones, or inability to empty your bladder completely      What are the signs and symptoms of a UTI?   •Fever and chills      •Pain or burning when you urinate      •Urine that smells bad or looks cloudy, or blood in your urine      •Urinating more often or waking from sleep to urinate      •Sudden, strong need to urinate      •Pain or pressure in your lower abdomen      •Leaking urine      •Confusion or agitation      •Fatigue, shakiness, and weakness      How is a UTI diagnosed? Your healthcare provider will ask about your symptoms. He or she may also examine you. You may need any of the following:   •A urinalysis will give information about your urinary tract and overall health.      •A urine culture may show the type of germ causing the infection. You may need this test again if you continue to have signs and symptoms after a UTI is treated.      How is a UTI treated? Medicines treat the bacterial infection or decrease pain and burning when you urinate. You may also need medicines to decrease the urge to urinate often. If you have UTIs often (called recurrent UTIs), you may be given antibiotics to take regularly. You will be given directions for when and how to use antibiotics. The goal is to prevent UTIs but not cause antibiotic resistance by using antibiotics too often.    How can I manage my symptoms?   •Drink liquids as directed. Liquids can help flush bacteria from your urinary tract. Ask how much liquid to drink each day and which liquids are best for you. You may need to drink more liquids than usual to help flush out the bacteria. Do not drink alcohol, caffeine, and citrus juices. These can irritate your bladder and increase your symptoms.      •Apply heat on your abdomen for 20 to 30 minutes every 2 hours for as many days as directed. Heat helps decrease discomfort and pressure in your bladder.      How can I help prevent a UTI?   •Urinate when you feel the urge. Do not hold your urine. Bacteria can grow if urine stays in the bladder too long. It may be helpful to urinate at least every 3 to 4 hours.      •Urinate after you have sex. This will help flush away bacteria that can enter your urinary tract during sex.      •Wear cotton underwear and clothes that are loose. Tight pants and nylon underwear can trap moisture and cause bacteria to grow.      •Drink cranberry juice or take cranberry supplements. These may help prevent UTIs. Your healthcare provider can recommend the right juice or supplement for you.      •Women should wipe front to back after urinating or having a bowel movement. This may prevent germs from getting into the urinary tract. Do not douche or use feminine deodorants. These can change the chemical balance in your vagina. You may also be given vaginal estrogen medicine. This medicine helps prevent recurrent UTIs in women who have gone through menopause or are in donal-menopause.      When should I seek immediate care?   •You become confused or agitated.      •You fall down.      •You are urinating very little or not at all.      •You are vomiting.      •You have a high fever with shaking chills.       •You have side or back pain that gets worse.      When should I call my doctor?   •You have a fever.      •You are a woman and you have increased white or yellow discharge from your vagina.      •You do not feel better after 2 days of taking antibiotics.      •You have questions or concerns about your condition or care.      CARE AGREEMENT:    You have the right to help plan your care. Learn about your health condition and how it may be treated. Discuss treatment options with your healthcare providers to decide what care you want to receive. You always have the right to refuse treatment.

## 2022-07-23 NOTE — ED PROCEDURE NOTE - PROCEDURE ADDITIONAL DETAILS
Emergency Department Focused Ultrasound performed at patient's bedside for placement of ultrasound guided IV. Dynamic gray scale imaging of the target vessel was obtained using a high frequency linear transducer. Both the target vein and surrounding arterial structures were visualized and identified. The patency of the targeted vein was confirmed with compression and lack of internal echoes. There was direct visualization of needle entry into the vein followed by successful catheter placement. The ultrasound study was confirmed with blood return and ease of flushing saline.     Upper extremity laterality: RUE  IV Gauge: 20

## 2022-07-23 NOTE — ED PROVIDER NOTE - OBJECTIVE STATEMENT
Patient is a 70 yo M with PMH sig for DDKT (2018), recent admission for urinary retention with UTI d/c on 7/21/22, presenting today complaining armenta is not draining. Has had decreased output from armenta for past 2 days, no output in past 2-3 hours. Espouses feeling of suprapubic fullness with no other current complaints or pain. Denies fevers, chills, cough, SOB, CP, abdominal pain, dizziness, syncope. Denies any blood draining from armenta.

## 2022-07-23 NOTE — ED PROVIDER NOTE - CLINICAL SUMMARY MEDICAL DECISION MAKING FREE TEXT BOX
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 No signs or symptoms of infection. Given complicated renal history with current retention will compare labs today to prior admission. Armenta to be replaced.

## 2022-07-23 NOTE — ED PROVIDER NOTE - PROGRESS NOTE DETAILS
Austin Donaldson MD PGY1: Velez irrigated and drained to ~300ml urine, feels suprapubic fullness has improved. Dispo pending cmp results, if not worsened from last admit can likely dc home at that time. Austin Donaldson MD PGY1: Discussed with patient switch to nitrofurantoin abx for UTI, given sensitivities on UCx. Cr is elevated but not increased by 1.5x from baseline established at prior discharge therefore not CHELA at this time. Discussed return precautions and medications, patient understanding and agreeable to discharge.

## 2022-07-23 NOTE — ED PROVIDER NOTE - NS ED ROS FT
Constitutional: No fevers, chills, weakness, fatigue, weight changes  Eyes: No change in vision, tearing, pain, discharge  ENT: No change in hearing, ear pain, throat pain  Cardiovascular: no chest pain, palpitations, leg swelling  Reespiratory: no SOB, cough  Gastrointestinal: no abdominal pain, diarrhea/constipation, bloody stool, bloating  Neurological: no headaches, dizziness, weakness, numbness, tingling     ROS otherwise Neg except as per HPI

## 2022-07-23 NOTE — ED PROVIDER NOTE - ATTENDING CONTRIBUTION TO CARE
70 yo M with PMH sig for DDKT (2018), recent admission for urinary retention with UTI d/c on 7/21/22, presenting today complaining armenta is not draining since this am with decrease outpt the past few days with fullness, suprapubic tenderness with no catheter outpt, reswap armenta, check labs on abx for uti likely urinary retention.

## 2022-07-23 NOTE — ED ADULT NURSE NOTE - NSIMPLEMENTINTERV_GEN_ALL_ED
Implemented All Universal Safety Interventions:  [place] to call system. Call bell, personal items and telephone within reach. Instruct patient to call for assistance. Room bathroom lighting operational. Non-slip footwear when patient is off stretcher. Physically safe environment: no spills, clutter or unnecessary equipment. Stretcher in lowest position, wheels locked, appropriate side rails in place.

## 2022-07-24 LAB
CULTURE RESULTS: NO GROWTH — SIGNIFICANT CHANGE UP
SPECIMEN SOURCE: SIGNIFICANT CHANGE UP

## 2022-07-28 ENCOUNTER — APPOINTMENT (OUTPATIENT)
Dept: UROLOGY | Facility: CLINIC | Age: 72
End: 2022-07-28

## 2022-07-28 VITALS
DIASTOLIC BLOOD PRESSURE: 73 MMHG | SYSTOLIC BLOOD PRESSURE: 141 MMHG | HEIGHT: 70 IN | BODY MASS INDEX: 30.35 KG/M2 | WEIGHT: 212 LBS | RESPIRATION RATE: 16 BRPM | TEMPERATURE: 208.04 F | HEART RATE: 73 BPM

## 2022-07-28 PROCEDURE — 51700 IRRIGATION OF BLADDER: CPT

## 2022-07-28 PROCEDURE — 99214 OFFICE O/P EST MOD 30 MIN: CPT | Mod: 25

## 2022-08-03 ENCOUNTER — APPOINTMENT (OUTPATIENT)
Dept: COLORECTAL SURGERY | Facility: CLINIC | Age: 72
End: 2022-08-03

## 2022-08-03 DIAGNOSIS — K62.89 OTHER SPECIFIED DISEASES OF ANUS AND RECTUM: ICD-10-CM

## 2022-08-03 PROCEDURE — 99212 OFFICE O/P EST SF 10 MIN: CPT | Mod: 25

## 2022-08-03 PROCEDURE — 46600 DIAGNOSTIC ANOSCOPY SPX: CPT

## 2022-08-03 NOTE — PHYSICAL EXAM
[de-identified] : No evidence of recurrent anal condyloma on anoscopy. Circumferential irritation of his perianal skin the area is very moist.

## 2022-08-03 NOTE — HISTORY OF PRESENT ILLNESS
[FreeTextEntry1] : 71-year-old male on immunosuppression with a history of condyloma resents complaining of persistent pain with defecation.

## 2022-08-09 ENCOUNTER — APPOINTMENT (OUTPATIENT)
Dept: ORTHOPEDIC SURGERY | Facility: CLINIC | Age: 72
End: 2022-08-09

## 2022-08-09 ENCOUNTER — APPOINTMENT (OUTPATIENT)
Dept: UROLOGY | Facility: CLINIC | Age: 72
End: 2022-08-09

## 2022-08-09 VITALS — BODY MASS INDEX: 30.35 KG/M2 | WEIGHT: 212 LBS | HEIGHT: 70 IN

## 2022-08-09 PROCEDURE — 99214 OFFICE O/P EST MOD 30 MIN: CPT

## 2022-08-09 PROCEDURE — 99213 OFFICE O/P EST LOW 20 MIN: CPT

## 2022-08-09 NOTE — PHYSICAL EXAM
[Left] : left knee [5___] : hamstring 5[unfilled]/5 [] : mildly antalgic [TWNoteComboBox7] : flexion 125 degrees [de-identified] : extension 0 degrees

## 2022-08-09 NOTE — HISTORY OF PRESENT ILLNESS
[4] : 4 [2] : 2 [Dull/Aching] : dull/aching [Localized] : localized [Rest] : rest [Meds] : meds [Injection therapy] : injection therapy [Walking] : walking [Exercising] : exercising [Stairs] : stairs [Euflexxa] : Euflexxa [de-identified] : 08/09/22: Here to f/up left knee, s/p completing series of Euflexxa injections 07.05.22. Reports that he has been seeing gradual improvement with left knee pain. Pain is less intense than it had been prior to injections and has been able to perform his ADLs.  [] : Post Surgical Visit: no [FreeTextEntry1] : lt knee  [de-identified] : 7/5/22 [de-identified] : lt knee [TWNoteComboBox1] : 80%

## 2022-08-09 NOTE — DISCUSSION/SUMMARY
[de-identified] : 71 male with left knee djd. completed euflexxa with relief\par 1) cryotherapy, rest and activity modification\par 2) activity as tolerated\par 3) rtc prn, if pain worsens will consider csi or repeat visco\par \par Entered by Katty Newton acting as scribe.\par \par

## 2022-08-10 LAB
APPEARANCE: ABNORMAL
BACTERIA: ABNORMAL
BILIRUBIN URINE: NEGATIVE
BLOOD URINE: ABNORMAL
COLOR: YELLOW
GLUCOSE QUALITATIVE U: NEGATIVE
HYALINE CASTS: 0 /LPF
KETONES URINE: NEGATIVE
LEUKOCYTE ESTERASE URINE: ABNORMAL
MICROSCOPIC-UA: NORMAL
NITRITE URINE: NEGATIVE
PH URINE: 6
PROTEIN URINE: ABNORMAL
RED BLOOD CELLS URINE: 11 /HPF
SPECIFIC GRAVITY URINE: 1.01
SQUAMOUS EPITHELIAL CELLS: 1 /HPF
UROBILINOGEN URINE: NORMAL
WHITE BLOOD CELLS URINE: 361 /HPF

## 2022-08-11 ENCOUNTER — NON-APPOINTMENT (OUTPATIENT)
Age: 72
End: 2022-08-11

## 2022-08-11 ENCOUNTER — APPOINTMENT (OUTPATIENT)
Dept: NEPHROLOGY | Facility: CLINIC | Age: 72
End: 2022-08-11

## 2022-08-11 VITALS
HEART RATE: 79 BPM | DIASTOLIC BLOOD PRESSURE: 62 MMHG | WEIGHT: 204 LBS | RESPIRATION RATE: 16 BRPM | BODY MASS INDEX: 29.2 KG/M2 | OXYGEN SATURATION: 98 % | TEMPERATURE: 207.5 F | HEIGHT: 70 IN | SYSTOLIC BLOOD PRESSURE: 121 MMHG

## 2022-08-11 LAB
25(OH)D3 SERPL-MCNC: 24.7 NG/ML
ALBUMIN SERPL ELPH-MCNC: 4.1 G/DL
ALP BLD-CCNC: 100 U/L
ALT SERPL-CCNC: 26 U/L
ANION GAP SERPL CALC-SCNC: 10 MMOL/L
AST SERPL-CCNC: 27 U/L
BACTERIA UR CULT: ABNORMAL
BASOPHILS # BLD AUTO: 0.02 K/UL
BASOPHILS NFR BLD AUTO: 0.2 %
BILIRUB SERPL-MCNC: 0.2 MG/DL
BUN SERPL-MCNC: 30 MG/DL
CALCIUM SERPL-MCNC: 10.2 MG/DL
CALCIUM SERPL-MCNC: 10.2 MG/DL
CHLORIDE SERPL-SCNC: 107 MMOL/L
CHOLEST SERPL-MCNC: 150 MG/DL
CO2 SERPL-SCNC: 23 MMOL/L
CREAT SERPL-MCNC: 2.76 MG/DL
CREAT SPEC-SCNC: 170 MG/DL
CREAT/PROT UR: 0.7 RATIO
EGFR: 24 ML/MIN/1.73M2
EOSINOPHIL # BLD AUTO: 0.04 K/UL
EOSINOPHIL NFR BLD AUTO: 0.5 %
ESTIMATED AVERAGE GLUCOSE: 131 MG/DL
GLUCOSE SERPL-MCNC: 174 MG/DL
HBA1C MFR BLD HPLC: 6.2 %
HCT VFR BLD CALC: 35.9 %
HDLC SERPL-MCNC: 79 MG/DL
HGB BLD-MCNC: 11.2 G/DL
IMM GRANULOCYTES NFR BLD AUTO: 1.1 %
LDH SERPL-CCNC: 165 U/L
LDLC SERPL CALC-MCNC: 30 MG/DL
LYMPHOCYTES # BLD AUTO: 2.85 K/UL
LYMPHOCYTES NFR BLD AUTO: 33.6 %
MAGNESIUM SERPL-MCNC: 1.6 MG/DL
MAN DIFF?: NORMAL
MCHC RBC-ENTMCNC: 29.7 PG
MCHC RBC-ENTMCNC: 31.2 GM/DL
MCV RBC AUTO: 95.2 FL
MONOCYTES # BLD AUTO: 0.58 K/UL
MONOCYTES NFR BLD AUTO: 6.8 %
NEUTROPHILS # BLD AUTO: 4.89 K/UL
NEUTROPHILS NFR BLD AUTO: 57.8 %
NONHDLC SERPL-MCNC: 71 MG/DL
PARATHYROID HORMONE INTACT: 44 PG/ML
PHOSPHATE SERPL-MCNC: 2.8 MG/DL
PLATELET # BLD AUTO: 183 K/UL
POTASSIUM SERPL-SCNC: 5.2 MMOL/L
PROT SERPL-MCNC: 7 G/DL
PROT UR-MCNC: 125 MG/DL
RBC # BLD: 3.77 M/UL
RBC # FLD: 18.6 %
SODIUM SERPL-SCNC: 140 MMOL/L
TACROLIMUS SERPL-MCNC: 10.4 NG/ML
TRIGL SERPL-MCNC: 206 MG/DL
URATE SERPL-MCNC: 7.5 MG/DL
WBC # FLD AUTO: 8.47 K/UL

## 2022-08-11 PROCEDURE — 99214 OFFICE O/P EST MOD 30 MIN: CPT

## 2022-08-11 RX ORDER — TACROLIMUS 1 MG/1
1 TABLET, EXTENDED RELEASE ORAL
Qty: 270 | Refills: 3 | Status: DISCONTINUED | COMMUNITY
Start: 2021-04-12 | End: 2022-08-11

## 2022-08-12 ENCOUNTER — OUTPATIENT (OUTPATIENT)
Dept: OUTPATIENT SERVICES | Facility: HOSPITAL | Age: 72
LOS: 1 days | End: 2022-08-12

## 2022-08-12 VITALS
RESPIRATION RATE: 16 BRPM | HEIGHT: 70 IN | SYSTOLIC BLOOD PRESSURE: 147 MMHG | TEMPERATURE: 97 F | WEIGHT: 203.93 LBS | OXYGEN SATURATION: 99 % | HEART RATE: 76 BPM | DIASTOLIC BLOOD PRESSURE: 75 MMHG

## 2022-08-12 DIAGNOSIS — N39.0 URINARY TRACT INFECTION, SITE NOT SPECIFIED: ICD-10-CM

## 2022-08-12 DIAGNOSIS — I77.0 ARTERIOVENOUS FISTULA, ACQUIRED: Chronic | ICD-10-CM

## 2022-08-12 DIAGNOSIS — Z90.79 ACQUIRED ABSENCE OF OTHER GENITAL ORGAN(S): Chronic | ICD-10-CM

## 2022-08-12 DIAGNOSIS — A63.0 ANOGENITAL (VENEREAL) WARTS: Chronic | ICD-10-CM

## 2022-08-12 DIAGNOSIS — Z94.0 KIDNEY TRANSPLANT STATUS: Chronic | ICD-10-CM

## 2022-08-12 DIAGNOSIS — Z94.0 KIDNEY TRANSPLANT STATUS: ICD-10-CM

## 2022-08-12 DIAGNOSIS — Z91.89 OTHER SPECIFIED PERSONAL RISK FACTORS, NOT ELSEWHERE CLASSIFIED: ICD-10-CM

## 2022-08-12 DIAGNOSIS — Z90.5 ACQUIRED ABSENCE OF KIDNEY: Chronic | ICD-10-CM

## 2022-08-12 DIAGNOSIS — I10 ESSENTIAL (PRIMARY) HYPERTENSION: ICD-10-CM

## 2022-08-12 DIAGNOSIS — E11.9 TYPE 2 DIABETES MELLITUS WITHOUT COMPLICATIONS: ICD-10-CM

## 2022-08-12 DIAGNOSIS — Z98.890 OTHER SPECIFIED POSTPROCEDURAL STATES: Chronic | ICD-10-CM

## 2022-08-12 LAB
BLD GP AB SCN SERPL QL: NEGATIVE — SIGNIFICANT CHANGE UP
RH IG SCN BLD-IMP: POSITIVE — SIGNIFICANT CHANGE UP

## 2022-08-12 PROCEDURE — 93010 ELECTROCARDIOGRAM REPORT: CPT

## 2022-08-12 RX ORDER — SODIUM BICARBONATE 1 MEQ/ML
1 SYRINGE (ML) INTRAVENOUS
Qty: 0 | Refills: 0 | DISCHARGE

## 2022-08-12 RX ORDER — VALGANCICLOVIR 450 MG/1
1 TABLET, FILM COATED ORAL
Qty: 0 | Refills: 0 | DISCHARGE

## 2022-08-12 RX ORDER — PYRIDOXINE HCL (VITAMIN B6) 100 MG
1 TABLET ORAL
Qty: 0 | Refills: 0 | DISCHARGE

## 2022-08-12 RX ORDER — SITAGLIPTIN 50 MG/1
1 TABLET, FILM COATED ORAL
Qty: 0 | Refills: 0 | DISCHARGE

## 2022-08-12 RX ORDER — TACROLIMUS 5 MG/1
1 CAPSULE ORAL
Qty: 0 | Refills: 0 | DISCHARGE

## 2022-08-12 RX ORDER — VALGANCICLOVIR 450 MG/1
450 TABLET, FILM COATED ORAL
Qty: 0 | Refills: 0 | DISCHARGE

## 2022-08-12 RX ORDER — ASPIRIN/CALCIUM CARB/MAGNESIUM 324 MG
1 TABLET ORAL
Qty: 0 | Refills: 0 | DISCHARGE

## 2022-08-12 RX ORDER — CITRIC ACID/SODIUM CITRATE 300-500 MG
5 SOLUTION, ORAL ORAL
Qty: 0 | Refills: 0 | DISCHARGE

## 2022-08-12 RX ORDER — MYCOPHENOLATE MOFETIL 250 MG/1
1 CAPSULE ORAL
Qty: 0 | Refills: 0 | DISCHARGE

## 2022-08-12 RX ORDER — NIFEDIPINE 30 MG
1 TABLET, EXTENDED RELEASE 24 HR ORAL
Qty: 0 | Refills: 0 | DISCHARGE

## 2022-08-12 RX ORDER — MAGNESIUM OXIDE 400 MG ORAL TABLET 241.3 MG
1 TABLET ORAL
Qty: 0 | Refills: 0 | DISCHARGE

## 2022-08-12 RX ORDER — ATENOLOL 25 MG/1
1 TABLET ORAL
Qty: 0 | Refills: 0 | DISCHARGE

## 2022-08-12 RX ORDER — TAMSULOSIN HYDROCHLORIDE 0.4 MG/1
2 CAPSULE ORAL
Qty: 0 | Refills: 0 | DISCHARGE

## 2022-08-12 RX ORDER — FAMOTIDINE 10 MG/ML
1 INJECTION INTRAVENOUS
Qty: 0 | Refills: 0 | DISCHARGE

## 2022-08-12 RX ORDER — SODIUM CHLORIDE 9 MG/ML
1000 INJECTION INTRAMUSCULAR; INTRAVENOUS; SUBCUTANEOUS
Refills: 0 | Status: DISCONTINUED | OUTPATIENT
Start: 2022-08-15 | End: 2022-08-29

## 2022-08-12 RX ORDER — NIACIN 50 MG
2 TABLET ORAL
Qty: 0 | Refills: 0 | DISCHARGE

## 2022-08-12 RX ORDER — GLIMEPIRIDE 1 MG
2 TABLET ORAL
Qty: 0 | Refills: 0 | DISCHARGE

## 2022-08-12 RX ORDER — CHOLESTYRAMINE 4 G/9G
4 POWDER, FOR SUSPENSION ORAL
Qty: 0 | Refills: 0 | DISCHARGE

## 2022-08-12 RX ORDER — FOLIC ACID 0.8 MG
1 TABLET ORAL
Qty: 0 | Refills: 0 | DISCHARGE

## 2022-08-12 RX ORDER — LIDOCAINE 4 G/100G
0 CREAM TOPICAL
Qty: 0 | Refills: 0 | DISCHARGE

## 2022-08-12 RX ORDER — FUROSEMIDE 40 MG
1 TABLET ORAL
Qty: 0 | Refills: 0 | DISCHARGE

## 2022-08-12 RX ORDER — PREGABALIN 225 MG/1
1 CAPSULE ORAL
Qty: 0 | Refills: 0 | DISCHARGE

## 2022-08-12 RX ORDER — ALLOPURINOL 300 MG
1 TABLET ORAL
Qty: 0 | Refills: 0 | DISCHARGE

## 2022-08-12 NOTE — ASU PATIENT PROFILE, ADULT - NSICDXPASTSURGICALHX_GEN_ALL_CORE_FT
PAST SURGICAL HISTORY:  Anal condyloma S/P excision; 21    AV fistula 2013/ left forearm    H/O colonoscopy     H/O ileostomy 2017   for 3 months. s/p Reversal    Kidney transplant recipient 2018  @ [facility] :  +  Hep C Donor    S/P anal fissurectomy and chemical denervation  with biopsy and fulguration of anal lesions, 2021    S/p nephrectomy left 9/15/2015   + Cancer    S/p nephrectomy right 12/10/2015   benign    S/P right knee arthroscopy     S/P TURP (transurethral resection of prostate) and Transplant Kidney Stent Replacemernt, 21

## 2022-08-12 NOTE — H&P PST ADULT - NSICDXPASTSURGICALHX_GEN_ALL_CORE_FT
PAST SURGICAL HISTORY:  AV fistula 2013/ left forearm    H/O colonoscopy     H/O ileostomy '    for 3 months. s/p Reversal    Kidney transplant recipient 2018  @ [facility] :  +  Hep C Donor    S/P anal fissurectomy and chemical denervation  with biopsy and fulguration of anal lesions, 2021    S/p nephrectomy left 9/15/2015   + Cancer    S/p nephrectomy right 12/10/2015   benign    S/P right knee arthroscopy     S/P TURP (transurethral resection of prostate) and Transplant Kidney Stent Replacemernt, 21     PAST SURGICAL HISTORY:  Anal condyloma S/P excision; 22    AV fistula 2013/ left forearm    H/O colonoscopy     H/O ileostomy 2017   for 3 months. s/p Reversal    Kidney transplant recipient 2018  @ [facility] :  +  Hep C Donor    S/P anal fissurectomy and chemical denervation  with biopsy and fulguration of anal lesions, 2021    S/p nephrectomy left 9/15/2015   + Cancer    S/p nephrectomy right 12/10/2015   benign    S/P right knee arthroscopy     S/P TURP (transurethral resection of prostate) and Transplant Kidney Stent Replacemernt, 21

## 2022-08-12 NOTE — H&P PST ADULT - NSSUBSTANCEUSE_GEN_ALL_CORE_SD
caffeine Initiate Treatment: tretinoin 0.05 % topical cream \\nQuantity: 45.0 g  Days Supply: 30\\nSig: Apply pea size amount to aa of face after moisturizing QOHS work way up to QHS\\n\\nclindamycin phosphate 1 % topical swab Qam\\nQuantity: 60.0 Packet  Days Supply: 30\\nSig: Apply to face qam Render In Strict Bullet Format?: No Detail Level: Zone

## 2022-08-12 NOTE — ASU PATIENT PROFILE, ADULT - FALL HARM RISK - UNIVERSAL INTERVENTIONS
Bed in lowest position, wheels locked, appropriate side rails in place/Call bell, personal items and telephone in reach/Instruct patient to call for assistance before getting out of bed or chair/Non-slip footwear when patient is out of bed/[place] to call system/Physically safe environment - no spills, clutter or unnecessary equipment/Purposeful Proactive Rounding/Room/bathroom lighting operational, light cord in reach

## 2022-08-12 NOTE — H&P PST ADULT - PRIMARY CARE PROVIDER
Nephrologist--Dr. Arian Bhakta [phone] - last note 02/10/22 on chart Dr. Annamarie Desir ([facility]) [phone]; Nephrologist--Dr. Arian Bhakta [phone] Dr. Annamarie Desir ([facility]) [phone]; Nephrologist-Dr. Arian Bhakta [phone]

## 2022-08-12 NOTE — H&P PST ADULT - PROBLEM SELECTOR PLAN 1
Schedule for cystoscopy, exchange of right ureteral stent, possible bipolar transurethral resection of the prostate tentatively on 08/15/2022. Pre op instructions, given and explained. Pt verbalized understanding.  Covid-19 PCR ordered, pt has scheduled appt at [address] today.    Pt is currently treated for UTI with Cefpodoxime 2 x day x 7 days, started on 08/11/22., ABx was prescribed by surgeon Dr. Phillip.

## 2022-08-12 NOTE — H&P PST ADULT - HISTORY OF PRESENT ILLNESS
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 He is scheduled for Excision, Fulguration of Anal Condyloma on 04/05/22.     Covid test 04/02/22. Denies current symptoms or recent exposure.  71 year old male with H/O: HTN, DM type 2, BPH, Gout, Prostate cancer (2001) - s/p RT complicated by urinary retention s/p Cystoscopy, TURP and Transplant Kidney Stent Replacement (11/09/21), pt presents today for PST with pre op diagnosis: UTI site not specified in preparation for cystoscopy, exchange of right ureteral stent, possible bipolar transurethral resection of the prostate.    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 S/P Excision, Fulguration of Anal Condyloma on 04/05/22.

## 2022-08-12 NOTE — H&P PST ADULT - PROBLEM SELECTOR PLAN 3
Pt instructed to take Envarsus, Prednisone and Mycophenolate with a sip of water A.M of surgery. Pt verbalized understanding.    Pt was seen by nephrologist on 08/11/22  Pending nephrology consultation

## 2022-08-12 NOTE — H&P PST ADULT - RESPIRATORY
clear to auscultation bilaterally/no wheezes/no rales/no rhonchi/no respiratory distress/no subcutaneous emphysema/airway patent/breath sounds equal/good air movement/respirations non-labored

## 2022-08-12 NOTE — H&P PST ADULT - PROBLEM SELECTOR PLAN 2
Monitor BS on day of surgery. Pt instructed not to take any diabetes medication A.M of surgery. Pt verbalized understanding.

## 2022-08-12 NOTE — H&P PST ADULT - NEGATIVE CARDIOVASCULAR SYMPTOMS
no chest pain/no palpitations/no dyspnea on exertion/no peripheral edema no chest pain/no palpitations/no dyspnea on exertion/no paroxysmal nocturnal dyspnea/no peripheral edema/no claudication

## 2022-08-12 NOTE — H&P PST ADULT - MUSCULOSKELETAL
details… normal/ROM intact/no joint swelling/no joint erythema/no calf tenderness/normal gait/strength 5/5 bilateral upper extremities/strength 5/5 bilateral lower extremities

## 2022-08-12 NOTE — H&P PST ADULT - PROBLEM SELECTOR PLAN 4
Pt instructed to take Atenolol, Nifedipine with a sip of water A.M of surgery. Pt verbalized understanding.

## 2022-08-12 NOTE — H&P PST ADULT - OTHER CARE PROVIDERS
Oncologist--Dr. Nicci Han, [place] [phone]// Transplant surgeon--Dr. Canela Oncologist--Dr. Nicci Han, [place] [phone] / Transplant surgeon--Dr. Canela Dr. Parker (Endo) [phone];  Oncologist--Dr. Nicci Han, [place] [phone] / Transplant surgeon--Dr. Canela

## 2022-08-14 ENCOUNTER — TRANSCRIPTION ENCOUNTER (OUTPATIENT)
Age: 72
End: 2022-08-14

## 2022-08-15 ENCOUNTER — OUTPATIENT (OUTPATIENT)
Dept: OUTPATIENT SERVICES | Facility: HOSPITAL | Age: 72
LOS: 1 days | Discharge: HOME | End: 2022-08-15

## 2022-08-15 ENCOUNTER — RESULT REVIEW (OUTPATIENT)
Age: 72
End: 2022-08-15

## 2022-08-15 ENCOUNTER — TRANSCRIPTION ENCOUNTER (OUTPATIENT)
Age: 72
End: 2022-08-15

## 2022-08-15 ENCOUNTER — APPOINTMENT (OUTPATIENT)
Dept: UROLOGY | Facility: HOSPITAL | Age: 72
End: 2022-08-15

## 2022-08-15 VITALS
OXYGEN SATURATION: 97 % | RESPIRATION RATE: 16 BRPM | SYSTOLIC BLOOD PRESSURE: 130 MMHG | DIASTOLIC BLOOD PRESSURE: 61 MMHG | HEART RATE: 66 BPM

## 2022-08-15 VITALS
HEART RATE: 77 BPM | WEIGHT: 203.93 LBS | DIASTOLIC BLOOD PRESSURE: 60 MMHG | RESPIRATION RATE: 14 BRPM | SYSTOLIC BLOOD PRESSURE: 123 MMHG | OXYGEN SATURATION: 99 % | TEMPERATURE: 98 F | HEIGHT: 70 IN

## 2022-08-15 DIAGNOSIS — I77.0 ARTERIOVENOUS FISTULA, ACQUIRED: Chronic | ICD-10-CM

## 2022-08-15 DIAGNOSIS — Z98.890 OTHER SPECIFIED POSTPROCEDURAL STATES: Chronic | ICD-10-CM

## 2022-08-15 DIAGNOSIS — Z94.0 KIDNEY TRANSPLANT STATUS: Chronic | ICD-10-CM

## 2022-08-15 DIAGNOSIS — Z90.5 ACQUIRED ABSENCE OF KIDNEY: Chronic | ICD-10-CM

## 2022-08-15 DIAGNOSIS — Z90.79 ACQUIRED ABSENCE OF OTHER GENITAL ORGAN(S): Chronic | ICD-10-CM

## 2022-08-15 DIAGNOSIS — N39.0 URINARY TRACT INFECTION, SITE NOT SPECIFIED: ICD-10-CM

## 2022-08-15 DIAGNOSIS — A63.0 ANOGENITAL (VENEREAL) WARTS: Chronic | ICD-10-CM

## 2022-08-15 LAB — GLUCOSE BLDC GLUCOMTR-MCNC: 122 MG/DL — HIGH (ref 70–99)

## 2022-08-15 PROCEDURE — 88305 TISSUE EXAM BY PATHOLOGIST: CPT | Mod: 26

## 2022-08-15 PROCEDURE — 52630 REMOVE PROSTATE REGROWTH: CPT

## 2022-08-15 PROCEDURE — 52332 CYSTOSCOPY AND TREATMENT: CPT | Mod: RT,22

## 2022-08-15 DEVICE — CATH GUID IMAGER II 5FR 65CM: Type: IMPLANTABLE DEVICE | Site: RIGHT | Status: FUNCTIONAL

## 2022-08-15 DEVICE — GUIDEWIRE SENSOR DUAL-FLEX NITINOL STRAIGHT .038" X 150CM: Type: IMPLANTABLE DEVICE | Site: RIGHT | Status: FUNCTIONAL

## 2022-08-15 DEVICE — GWIRE ZIP ANG 0.38X150CM: Type: IMPLANTABLE DEVICE | Site: RIGHT | Status: FUNCTIONAL

## 2022-08-15 DEVICE — STENT RENAL GREENE 7X8-20CM SET TRANSPLANT: Type: IMPLANTABLE DEVICE | Site: RIGHT | Status: FUNCTIONAL

## 2022-08-15 RX ORDER — CEFTRIAXONE 500 MG/1
1000 INJECTION, POWDER, FOR SOLUTION INTRAMUSCULAR; INTRAVENOUS EVERY 24 HOURS
Refills: 0 | Status: DISCONTINUED | OUTPATIENT
Start: 2022-08-15 | End: 2022-08-29

## 2022-08-15 RX ORDER — ONDANSETRON 8 MG/1
4 TABLET, FILM COATED ORAL ONCE
Refills: 0 | Status: DISCONTINUED | OUTPATIENT
Start: 2022-08-15 | End: 2022-08-29

## 2022-08-15 RX ORDER — OXYCODONE HYDROCHLORIDE 5 MG/1
5 TABLET ORAL ONCE
Refills: 0 | Status: DISCONTINUED | OUTPATIENT
Start: 2022-08-15 | End: 2022-08-15

## 2022-08-15 RX ORDER — FENTANYL CITRATE 50 UG/ML
50 INJECTION INTRAVENOUS
Refills: 0 | Status: DISCONTINUED | OUTPATIENT
Start: 2022-08-15 | End: 2022-08-15

## 2022-08-15 RX ORDER — FENTANYL CITRATE 50 UG/ML
25 INJECTION INTRAVENOUS
Refills: 0 | Status: DISCONTINUED | OUTPATIENT
Start: 2022-08-15 | End: 2022-08-15

## 2022-08-15 RX ORDER — LIDOCAINE 4 G/100G
1 CREAM TOPICAL
Qty: 30 | Refills: 0
Start: 2022-08-15

## 2022-08-15 NOTE — HISTORY OF PRESENT ILLNESS
[80: Normal activity with effort; some signs or symptoms of disease.] : 80: Normal activity with effort; some signs or symptoms of disease.  [FreeTextEntry1] : Tube exchanged October 29th.  Pt diagnosed with Covid, wife was diagnosed with Covid on Jan 28th 2021.  On 29th pt was tested in an urgent care, tested positive and went to [place].  Had no symptoms.  Received the monoclonal antibody on the 29th.  Creatinine in 3's in [facility] and he was admitted and received 5 days of IV antibiotics for urine infection.\par \par Pt admitted June 2nd 2021 for CHELA and hyperkalemia.  Found to have UTI and treated.  Had pseudomonas in urine and received zosyn.  CHELA improved before discharge.  Creatinine decreased to 2.7 before discharge.  Stent internalized\par \par Receiving RT therapy for prostate. Has had admissions with urinary retention after RT started.  \par Saw an oncologist who recently performed labs and mentioned that creatinine was high.  Pt still has armenta catheter and was advised to have a TURP.  Pt will be seeing Dr. Collins this Thursday.  Had a positive urine culture end of September.  \par \par friend Dr. Estrada [facility] [phone].\par \par Pt was admitted to [facility] 11/5 for IV abx prior to planned TURP 11/8.  11/9/21 - s/p TURP and transplant kidney stent replacement.  Armenta removed. Pt voided very light pink about 250 cc. \par \par Pt has followed up with Dr. Phillip, urinating well, had 80 cc PVR.  \par \par Pt had anal surgery for fissure, saw Dr. Morrison - has pain but healing.  He is urinating well [de-identified] : Last creatinine 2.7.  He was recently admitted with a UTI and urinary retention.  Having urinary dribbling again and some dysuria.

## 2022-08-15 NOTE — ASU DISCHARGE PLAN (ADULT/PEDIATRIC) - CARE PROVIDER_API CALL
John Phillip)  Urology  [address] [place] [address], [place] [address]  Phone: [phone]  Fax: [phone]  Scheduled Appointment: 08/16/2022

## 2022-08-15 NOTE — ASU DISCHARGE PLAN (ADULT/PEDIATRIC) - CARE COORDINATION DISCHARGE PLANNING
Levon Luis is a 58 year old male here for  No chief complaint on file.    Denies latex allergy or sensitivity.    Medication verified, no changes.  PCP and Pharmacy verified.    Social History     Tobacco Use   Smoking Status Current Every Day Smoker   • Packs/day: 0.25   • Years: 10.00   • Pack years: 2.50   • Types: Cigarettes   Smokeless Tobacco Never Used     Advance Directives Filed: No    ECOG:   ECOG [09/04/20 0838]   ECOG Performance Status 1       Height: No.  Ht Readings from Last 1 Encounters:   01/18/20 6' 2\" (1.88 m)     Weight:No.  Wt Readings from Last 3 Encounters:   09/01/20 68.3 kg   07/24/20 67.3 kg   07/07/20 67.2 kg       BMI: There is no height or weight on file to calculate BMI.    REVIEW OF SYSTEMS  GENERAL:  Patient denies headache, fevers, chills, night sweats, excessive fatigue, change in appetite, weight loss, dizziness  ALLERGIC/IMMUNOLOGIC: Verified allergies: Yes  EYES:  Patient denies significant visual difficulties, double vision, blurred vision  ENT/MOUTH: Patient denies problems with hearing, sore throat, sinus drainage, mouth sores  ENDOCRINE:  Patient denies diabetes, thyroid disease, hormone replacement, hot flashes  HEMATOLOGIC/LYMPHATIC: Patient denies easy bruising, bleeding, tender lymph nodes, swollen lymph nodes  BREASTS: Patient denies abnormal masses of breast, nipple discharge, pain  RESPIRATORY:  Patient denies lung pain with breathing, cough, coughing up blood, shortness of breath  CARDIOVASCULAR:  Patient denies anginal chest pain, palpitations, shortness of breath when lying flat, peripheral edema  GASTROINTESTINAL: Patient denies abdominal pain , nausea, vomiting, diarrhea, GI bleeding, constipation, change in bowel habits, heartburn, sensation of feeling full, difficulty swallowing  : Patient denies blood in the urine, burning with urination, frequency, urgency, hesitancy, incontinence  MUSCULOSKELETAL:  Patient denies joint pain, bone pain, joint swelling,  redness, decreased range of motion  SKIN:  Patient denies chronic rashes, inflammation, ulcerations, skin changes, itching  NEUROLOGIC:  Patient denies loss of balance, areas of focal weakness, abnormal gait, sensory problems, numbness, tingling  PSYCHIATRIC: Patient denies insomnia, depression, anxiety   No

## 2022-08-15 NOTE — BRIEF OPERATIVE NOTE - OPERATION/FINDINGS
Procedure: Transplant stent exchange, TURP  Preop dx: UTI, hx of transplant  Postop dx: UTI, hx of transplant

## 2022-08-15 NOTE — ASU DISCHARGE PLAN (ADULT/PEDIATRIC) - FOLLOW UP APPOINTMENTS
[facility] may also call Recovery Room (PACU) 24/7 @ [phone]/[facility]

## 2022-08-15 NOTE — ASSESSMENT
[FreeTextEntry1] : 1. CKD of kidney transplantation - Pts aaron creatinine 1.9 but had CHELA with pyelonephritis. Has had multiple episodes of CHELA.  Pt in process of relisting but on hold due to prostate cancer.  Cell free DNA 0.2% in October end.  Last creatinine 2.7.  Pt also has internal JJ stent being exchaged by Dr. Phillip.\par 2. Immunosuppression - simulect induction, tacrolimus target 5-7, MMF 250mgs BID.  \par 3. DM2 - on januvia and glimepiride. BG controlled at home.  A1c at goal. \par 4. HTN - controlled at home.\par 5. HCV - genotype 3a. completed Epclusa. Last vl note detected. \par 6. Anemia - due to CKD/CHELA.  last Hb at goal. \par 7. Chronic diarrhea - on and off on lomotil prn. Has seen GI and had a colonoscopy in 2019. Has history of small bowel resection which may be the cause of diarrhea. 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 \par 9.  UTI - last culture positive with MDR organism.  f/u repeat urine culture. \par 10.  COVID19 -  Resolved.  Received monoclonal antibody and recently vaccinated.  \par 11.  Prostate cancer - Completed androgen depletion and completed RT.  Last PSA was low. \par 12.  Urine retention - now s/p TURP.  PVR initially improved but recently readmitted with urinary retention and UTI.  He will f/u with Dr. Phillip, may need repeat TURP. \par 13.  Anal fissure s/p sugery -  Following with Dr. Morrison. \par \par f/u in 3 months.

## 2022-08-15 NOTE — ASU DISCHARGE PLAN (ADULT/PEDIATRIC) - ASU DC SPECIAL INSTRUCTIONSFT
CATHETER: Some patients are sent home with a armenta catheter, while others go home urinating on their own. If you still have a catheter, the nurses will review instructions and care before you go home. For men, you may have a prescription for lidocaine jelly to apply to the tip of your penis, as needed, for catheter related discomfort.  GENERAL: It is common to have blood in your urine after your procedure. It may be pink or even red; inform your doctor if you have a significant amount of clot in the urine or if you are unable to void at all or if your catheter stops draining. The urine may clear and then become bloody again especially as you are more physically active. It is not uncommon to have some burning when you urinate, this will gradually improve. With a catheter in place, it is not uncommon to have occasional leakage or urine or blood around the catheter. Please call your urologist if this is excessive and/or the urine is not draining through the catheter into the bag.  BATHING: You may shower or bathe. If going home with armenta, shower only until catheter is removed.  DIET: You may resume your regular diet and regular medication regimen.  PAIN: You may take Tylenol (acetaminophen) 650-975mg and/or Motrin (ibuprofen) 400-600mg, both available over the counter, for pain every 6 hours as needed. Do not exceed 4000mg of Tylenol (acetaminophen) daily. You may alternate these medications such that you take one or the other every 3 hours for around the clock pain coverage.  ANTIBIOTICS: You may be given a prescription for an antibiotic, please take this medication as instructed and be sure to complete the entire course.  STOOL SOFTENERS: Do not allow yourself to become constipated as straining may cause bleeding. Take stool softeners or a laxative (ex. Miralax, Colace, Senokot, ExLax, etc), available over the counter, if needed.  ACTIVITY: No heavy lifting or strenuous exercise until you are evaluated at your post-operative appointment. Otherwise, you may return to your usual level of physical activity.  ANTICOAGULATION: If you are taking any blood thinning medications, please discuss with your urologist prior to restarting these medications unless otherwise specified.  FOLLOW-UP: If you did not already schedule your post-operative appointment, please call your urologist to schedule and follow-up appointment. Plan to see Dr Marjan Montez for armenta removal in the clinic.   CALL YOUR UROLOGIST IF: You have any bleeding that does not stop, inability to void >8 hours, fever over 100.4 F, chills, persistent nausea/vomiting, changes in your incision concerning for infection, or if your pain is not controlled on your discharge pain medications.

## 2022-08-15 NOTE — ASU DISCHARGE PLAN (ADULT/PEDIATRIC) - NS MD DC FALL RISK RISK
For information on Fall & Injury Prevention, visit: [email or web address] OR  [email or web address] OR  https://www.cdc.gov/steadi/patient.html

## 2022-08-15 NOTE — REASON FOR VISIT
[Follow-Up] : a follow-up visit  [Initial] : an initial visit for [Kidney Transplant Evaluation] : kidney transplant evaluation [FreeTextEntry2] : Dr. Bonilla

## 2022-08-16 ENCOUNTER — APPOINTMENT (OUTPATIENT)
Dept: UROLOGY | Facility: CLINIC | Age: 72
End: 2022-08-16

## 2022-08-16 ENCOUNTER — OUTPATIENT (OUTPATIENT)
Dept: OUTPATIENT SERVICES | Facility: HOSPITAL | Age: 72
LOS: 1 days | End: 2022-08-16
Payer: MEDICARE

## 2022-08-16 ENCOUNTER — OUTPATIENT (OUTPATIENT)
Dept: OUTPATIENT SERVICES | Facility: HOSPITAL | Age: 72
LOS: 1 days | End: 2022-08-16

## 2022-08-16 VITALS
HEART RATE: 71 BPM | BODY MASS INDEX: 29.35 KG/M2 | SYSTOLIC BLOOD PRESSURE: 131 MMHG | WEIGHT: 205 LBS | HEIGHT: 70 IN | DIASTOLIC BLOOD PRESSURE: 69 MMHG | TEMPERATURE: 208.76 F

## 2022-08-16 DIAGNOSIS — I77.0 ARTERIOVENOUS FISTULA, ACQUIRED: Chronic | ICD-10-CM

## 2022-08-16 DIAGNOSIS — Z98.890 OTHER SPECIFIED POSTPROCEDURAL STATES: Chronic | ICD-10-CM

## 2022-08-16 DIAGNOSIS — Z90.5 ACQUIRED ABSENCE OF KIDNEY: Chronic | ICD-10-CM

## 2022-08-16 DIAGNOSIS — Z90.79 ACQUIRED ABSENCE OF OTHER GENITAL ORGAN(S): Chronic | ICD-10-CM

## 2022-08-16 DIAGNOSIS — R35.0 FREQUENCY OF MICTURITION: ICD-10-CM

## 2022-08-16 DIAGNOSIS — Z94.0 KIDNEY TRANSPLANT STATUS: Chronic | ICD-10-CM

## 2022-08-16 DIAGNOSIS — N13.5 CROSSING VESSEL AND STRICTURE OF URETER WITHOUT HYDRONEPHROSIS: ICD-10-CM

## 2022-08-16 DIAGNOSIS — A63.0 ANOGENITAL (VENEREAL) WARTS: Chronic | ICD-10-CM

## 2022-08-16 LAB
APPEARANCE: ABNORMAL
BACTERIA: ABNORMAL
BILIRUBIN URINE: NEGATIVE
BKV DNA SPEC QL NAA+PROBE: NOT DETECTED IU/ML
BLOOD URINE: ABNORMAL
CMV DNA SPEC QL NAA+PROBE: NOT DETECTED IU/ML
CMVPCR LOG: NOT DETECTED LOG10IU/ML
COLOR: YELLOW
GLUCOSE BLDC GLUCOMTR-MCNC: 112 MG/DL — HIGH (ref 70–99)
GLUCOSE QUALITATIVE U: NEGATIVE
HYALINE CASTS: 3 /LPF
KETONES URINE: NEGATIVE
LEUKOCYTE ESTERASE URINE: ABNORMAL
MICROSCOPIC-UA: NORMAL
NITRITE URINE: NEGATIVE
PH URINE: 6
PROTEIN URINE: ABNORMAL
RED BLOOD CELLS URINE: 51 /HPF
SPECIFIC GRAVITY URINE: 1.02
SQUAMOUS EPITHELIAL CELLS: 2 /HPF
UROBILINOGEN URINE: NORMAL
WHITE BLOOD CELLS URINE: >720 /HPF

## 2022-08-16 PROCEDURE — 51700 IRRIGATION OF BLADDER: CPT

## 2022-08-16 PROCEDURE — 51700 IRRIGATION OF BLADDER: CPT | Mod: 58

## 2022-08-16 PROCEDURE — 99024 POSTOP FOLLOW-UP VISIT: CPT

## 2022-08-18 ENCOUNTER — APPOINTMENT (OUTPATIENT)
Dept: ORTHOPEDIC SURGERY | Facility: CLINIC | Age: 72
End: 2022-08-18

## 2022-08-18 VITALS — BODY MASS INDEX: 29.35 KG/M2 | HEIGHT: 70 IN | WEIGHT: 205 LBS

## 2022-08-18 PROCEDURE — 99213 OFFICE O/P EST LOW 20 MIN: CPT

## 2022-08-18 NOTE — DISCUSSION/SUMMARY
[de-identified] : 71 male with left knee djd. completed euflexxa with relief\par 1) cryotherapy, rest and activity modification\par 2) activity as tolerated\par 3) rtc prn, if pain worsens will consider csi \par \par Entered by Katty Newton acting as scribe.\par \par

## 2022-08-18 NOTE — PHYSICAL EXAM
[Left] : left knee [5___] : hamstring 5[unfilled]/5 [] : mildly antalgic [TWNoteComboBox7] : flexion 125 degrees [de-identified] : extension 0 degrees

## 2022-08-18 NOTE — HISTORY OF PRESENT ILLNESS
[3] : 3 [2] : 2 [de-identified] : 08/18/22: Here to f/up left knee. Notes some swelling but minimal pain. \par 08/09/22: Here to f/up left knee, s/p completing series of Euflexxa injections 07.05.22. Reports that he has been seeing gradual improvement with left knee pain. Pain is less intense than it had been prior to injections and has been able to perform his ADLs.  [FreeTextEntry1] : lt knee  [FreeTextEntry5] :  JUAN CARLOS COPELAND is a 71 year male who is here today for lt knee pain.

## 2022-08-22 LAB — SURGICAL PATHOLOGY STUDY: SIGNIFICANT CHANGE UP

## 2022-08-26 ENCOUNTER — NON-APPOINTMENT (OUTPATIENT)
Age: 72
End: 2022-08-26

## 2022-08-26 LAB
ALBUMIN SERPL ELPH-MCNC: 3.9 G/DL
ANION GAP SERPL CALC-SCNC: 11 MMOL/L
BASOPHILS # BLD AUTO: 0.03 K/UL
BASOPHILS NFR BLD AUTO: 0.4 %
BUN SERPL-MCNC: 29 MG/DL
CALCIUM SERPL-MCNC: 9.8 MG/DL
CHLORIDE SERPL-SCNC: 108 MMOL/L
CO2 SERPL-SCNC: 22 MMOL/L
CREAT SERPL-MCNC: 2.24 MG/DL
EGFR: 30 ML/MIN/1.73M2
EOSINOPHIL # BLD AUTO: 0.08 K/UL
EOSINOPHIL NFR BLD AUTO: 1.1 %
GLUCOSE SERPL-MCNC: 179 MG/DL
HCT VFR BLD CALC: 37.4 %
HGB BLD-MCNC: 11.4 G/DL
IMM GRANULOCYTES NFR BLD AUTO: 0.8 %
LYMPHOCYTES # BLD AUTO: 2.66 K/UL
LYMPHOCYTES NFR BLD AUTO: 36.4 %
MAN DIFF?: NORMAL
MCHC RBC-ENTMCNC: 30 PG
MCHC RBC-ENTMCNC: 30.5 GM/DL
MCV RBC AUTO: 98.4 FL
MONOCYTES # BLD AUTO: 0.58 K/UL
MONOCYTES NFR BLD AUTO: 7.9 %
NEUTROPHILS # BLD AUTO: 3.9 K/UL
NEUTROPHILS NFR BLD AUTO: 53.4 %
PHOSPHATE SERPL-MCNC: 3.1 MG/DL
PLATELET # BLD AUTO: 180 K/UL
POTASSIUM SERPL-SCNC: 4.9 MMOL/L
RBC # BLD: 3.8 M/UL
RBC # FLD: 18 %
SODIUM SERPL-SCNC: 141 MMOL/L
TACROLIMUS SERPL-MCNC: 4.2 NG/ML
WBC # FLD AUTO: 7.31 K/UL

## 2022-08-30 DIAGNOSIS — N39.0 URINARY TRACT INFECTION, SITE NOT SPECIFIED: ICD-10-CM

## 2022-08-30 DIAGNOSIS — T86.19 OTHER COMPLICATION OF KIDNEY TRANSPLANT: ICD-10-CM

## 2022-09-06 ENCOUNTER — TRANSCRIPTION ENCOUNTER (OUTPATIENT)
Age: 72
End: 2022-09-06

## 2022-09-15 NOTE — END OF VISIT
[Time Spent: ___ minutes] : I have spent [unfilled] minutes of time on the encounter. Muscle Hinge Flap Text: The defect edges were debeveled with a #15 scalpel blade.  Given the size, depth and location of the defect and the proximity to free margins a muscle hinge flap was deemed most appropriate.  Using a sterile surgical marker, an appropriate hinge flap was drawn incorporating the defect. The area thus outlined was incised with a #15 scalpel blade.  The skin margins were undermined to an appropriate distance in all directions utilizing iris scissors.

## 2022-09-30 ENCOUNTER — APPOINTMENT (OUTPATIENT)
Dept: ORTHOPEDIC SURGERY | Facility: CLINIC | Age: 72
End: 2022-09-30

## 2022-09-30 VITALS — HEIGHT: 70 IN | WEIGHT: 205 LBS | BODY MASS INDEX: 29.35 KG/M2

## 2022-09-30 PROCEDURE — 99213 OFFICE O/P EST LOW 20 MIN: CPT

## 2022-09-30 PROCEDURE — 73564 X-RAY EXAM KNEE 4 OR MORE: CPT | Mod: RT

## 2022-09-30 NOTE — REASON FOR VISIT
[FreeTextEntry2] : 09/30/2022 Mr. JUAN CARLOS COPELAND,  72 year old  male , presents today for Bilateral knee pain, has beeen seen for left knee in the past \par

## 2022-09-30 NOTE — DISCUSSION/SUMMARY
[de-identified] : 72m with advanced b/l knee djd, temporary relief after euflexxa injections for the lef tknee. \par 1) recommended consult with joint replacement specialist to discuss possible tka\par 2) cryotherapy, rest and activity modification\par \par \par Entered by Katty Newton acting as scribe.\par

## 2022-09-30 NOTE — PHYSICAL EXAM
[Left] : left knee [NL (0)] : extension 0 degrees [5___] : quadriceps 5[unfilled]/5 [] : patient ambulates without assistive device [Right] : right knee [AP] : anteroposterior [Lateral] : lateral [[place]] : skyline [AP Standing] : anteroposterior standing [There are no fractures, subluxations or dislocations. No significant abnormalities are seen] : There are no fractures, subluxations or dislocations. No significant abnormalities are seen [advanced tricompartmental OA with medial compartment narrowing and varus alignment] : advanced tricompartmental OA with medial compartment narrowing and varus alignment [Advanced patellofemoral OA] : Advanced patellofemoral OA [de-identified] : extension 0 degrees [TWNoteComboBox7] : flexion 120 degrees

## 2022-10-04 ENCOUNTER — APPOINTMENT (OUTPATIENT)
Dept: UROLOGY | Facility: CLINIC | Age: 72
End: 2022-10-04

## 2022-10-05 ENCOUNTER — NON-APPOINTMENT (OUTPATIENT)
Age: 72
End: 2022-10-05

## 2022-10-08 LAB
APPEARANCE: ABNORMAL
BACTERIA UR CULT: NORMAL
BACTERIA: ABNORMAL
BILIRUBIN URINE: ABNORMAL
BLOOD URINE: ABNORMAL
COLOR: YELLOW
GLUCOSE QUALITATIVE U: NEGATIVE
HYALINE CASTS: 0 /LPF
KETONES URINE: NEGATIVE
LEUKOCYTE ESTERASE URINE: ABNORMAL
MICROSCOPIC-UA: NORMAL
NITRITE URINE: POSITIVE
PH URINE: 5.5
PROTEIN URINE: ABNORMAL
RED BLOOD CELLS URINE: 15 /HPF
SPECIFIC GRAVITY URINE: 1.02
SQUAMOUS EPITHELIAL CELLS: 0 /HPF
URINE COMMENTS: NORMAL
UROBILINOGEN URINE: NORMAL
WHITE BLOOD CELLS URINE: 248 /HPF

## 2022-10-10 LAB — PSA SERPL-MCNC: 0.02 NG/ML

## 2022-10-11 DIAGNOSIS — N39.0 URINARY TRACT INFECTION, SITE NOT SPECIFIED: ICD-10-CM

## 2022-10-11 DIAGNOSIS — T86.19 OTHER COMPLICATION OF KIDNEY TRANSPLANT: ICD-10-CM

## 2022-10-12 ENCOUNTER — APPOINTMENT (OUTPATIENT)
Dept: ORTHOPEDIC SURGERY | Facility: CLINIC | Age: 72
End: 2022-10-12

## 2022-10-12 VITALS — BODY MASS INDEX: 29.35 KG/M2 | WEIGHT: 205 LBS | HEIGHT: 70 IN

## 2022-10-12 PROCEDURE — J3490M: CUSTOM

## 2022-10-12 PROCEDURE — 20610 DRAIN/INJ JOINT/BURSA W/O US: CPT

## 2022-10-12 PROCEDURE — 99214 OFFICE O/P EST MOD 30 MIN: CPT | Mod: 25

## 2022-10-12 NOTE — HISTORY OF PRESENT ILLNESS
[Gradual] : gradual [8] : 8 [6] : 6 [Dull/Aching] : dull/aching [Localized] : localized [Sharp] : sharp [Throbbing] : throbbing [Constant] : constant [Nothing helps with pain getting better] : Nothing helps with pain getting better [Walking] : walking [Stairs] : stairs [de-identified] : 72M p/w sam knee pain L>R today. Has known advanced bilateral knee OA. Has tried visco and CSI which help temporarily. he has tried PT and NSAIDs previously. He is unsure if he is ready for TKA at this time. [] : no [FreeTextEntry1] : left knee [FreeTextEntry3] : few months  [FreeTextEntry5] : he is having pain,swelling and it is clicking  [de-identified] : activity  [de-identified] : 9/30/22 [de-identified] : Dr Napoles  [de-identified] : XR

## 2022-10-12 NOTE — PROCEDURE
[Left] : of the left [Knee] : knee [Pain] : pain [Inflammation] : inflammation [X-ray evidence of Osteoarthritis on this or prior visit] : x-ray evidence of Osteoarthritis on this or prior visit [Alcohol] : alcohol [Betadine] : betadine [Ethyl Chloride sprayed topically] : ethyl chloride sprayed topically [Sterile technique used] : sterile technique used [___ cc    0.5%] : Bupivacaine (Marcaine) ~Vcc of 0.5%  [___ cc    40mg] : Triamcinolone (Kenalog) ~Vcc of 40 mg  [Call if redness, pain or fever occur] : call if redness, pain or fever occur [Apply ice for 15min out of every hour for the next 12-24 hours as tolerated] : apply ice for 15 minutes out of every hour for the next 12-24 hours as tolerated [Previous OTC use and PT nontherapeutic] : patient has tried OTC's including aspirin, Ibuprofen, Aleve, etc or prescription NSAIDS, and/or exercises at home and/or physical therapy without satisfactory response [Patient had decreased mobility in the joint] : patient had decreased mobility in the joint [Risks, benefits, alternatives discussed / Verbal consent obtained] : the risks benefits, and alternatives have been discussed, and verbal consent was obtained

## 2022-10-12 NOTE — PHYSICAL EXAM
[Normal Sensation] : normal sensation [Normal Mood and Affect] : normal mood and affect [Orientated] : orientated [Left] : left knee [NL (0)] : extension 0 degrees [5___] : quadriceps 5[unfilled]/5 [Right] : right knee [AP] : anteroposterior [Lateral] : lateral [[place]] : skyline [AP Standing] : anteroposterior standing [There are no fractures, subluxations or dislocations. No significant abnormalities are seen] : There are no fractures, subluxations or dislocations. No significant abnormalities are seen [advanced tricompartmental OA with medial compartment narrowing and varus alignment] : advanced tricompartmental OA with medial compartment narrowing and varus alignment [Advanced patellofemoral OA] : Advanced patellofemoral OA [] : no extensor lag [de-identified] : extension 0 degrees [TWNoteComboBox7] : flexion 120 degrees

## 2022-10-12 NOTE — ASSESSMENT
[FreeTextEntry1] : 72M p/w adv sam knee OA\par \par L knee CSI tolerated well, we discussed r b a to TKA and the post op expectations, he would like to consider his options, he will return 2-3 mo\par \par The risks, benefits, contents and alternatives to injection were explained in full to the patient. Risks outlined include but are not limited to infection, sepsis, bleeding, scarring, skin discoloration, temporary increase in pain, syncopal episode, failure to resolve symptoms, allergic reaction, flare reaction, permanent white skin discoloration, symptom recurrence, and elevation of blood sugar in diabetics. Patient understood the risks. All questions were answered. After discussion of options, patient requested an injection. Oral informed consent was obtained and sterile prep was done of the injection site. Sterile technique was used to introduce the mixture. Patient tolerated the procedure well. Patient advised to ice the injection site this evening. Signs and symptoms of infection reviewed and patient advised to call immediately for redness, fevers, and/or chills.\par

## 2022-10-13 ENCOUNTER — APPOINTMENT (OUTPATIENT)
Dept: UROLOGY | Facility: CLINIC | Age: 72
End: 2022-10-13

## 2022-10-13 ENCOUNTER — APPOINTMENT (OUTPATIENT)
Dept: NEPHROLOGY | Facility: CLINIC | Age: 72
End: 2022-10-13

## 2022-10-13 VITALS
HEART RATE: 82 BPM | DIASTOLIC BLOOD PRESSURE: 66 MMHG | TEMPERATURE: 97.2 F | SYSTOLIC BLOOD PRESSURE: 154 MMHG | HEIGHT: 70 IN | WEIGHT: 210 LBS | BODY MASS INDEX: 30.06 KG/M2 | RESPIRATION RATE: 14 BRPM | OXYGEN SATURATION: 96 %

## 2022-10-13 DIAGNOSIS — R31.0 GROSS HEMATURIA: ICD-10-CM

## 2022-10-13 PROCEDURE — 99214 OFFICE O/P EST MOD 30 MIN: CPT | Mod: 24

## 2022-10-13 PROCEDURE — 99214 OFFICE O/P EST MOD 30 MIN: CPT

## 2022-10-13 RX ORDER — SITAGLIPTIN 25 MG/1
25 TABLET, FILM COATED ORAL DAILY
Qty: 90 | Refills: 3 | Status: DISCONTINUED | COMMUNITY
Start: 2018-07-27 | End: 2022-10-13

## 2022-10-13 RX ORDER — CEFPODOXIME PROXETIL 100 MG/1
100 TABLET, FILM COATED ORAL
Qty: 14 | Refills: 0 | Status: DISCONTINUED | COMMUNITY
Start: 2022-08-11 | End: 2022-10-13

## 2022-10-13 NOTE — PHYSICAL EXAM
[General Appearance - Alert] : alert [General Appearance - In No Acute Distress] : in no acute distress [General Appearance - Well Nourished] : well nourished [Sclera] : the sclera and conjunctiva were normal [Extraocular Movements] : extraocular movements were intact [Neck Appearance] : the appearance of the neck was normal [Neck Cervical Mass (___cm)] : no neck mass was observed [] : no respiratory distress [Respiration, Rhythm And Depth] : normal respiratory rhythm and effort [Exaggerated Use Of Accessory Muscles For Inspiration] : no accessory muscle use [Auscultation Breath Sounds / Voice Sounds] : lungs were clear to auscultation bilaterally [Heart Rate And Rhythm] : heart rate was normal and rhythm regular [Heart Sounds] : normal S1 and S2 [Heart Sounds Gallop] : no gallops [Murmurs] : no murmurs [Edema] : there was no peripheral edema [Bowel Sounds] : normal bowel sounds [Abdomen Soft] : soft [Abdomen Tenderness] : non-tender [Cervical Lymph Nodes Enlarged Posterior Bilaterally] : posterior cervical [Cervical Lymph Nodes Enlarged Anterior Bilaterally] : anterior cervical [Supraclavicular Lymph Nodes Enlarged Bilaterally] : supraclavicular [Abnormal Walk] : normal gait [Skin Color & Pigmentation] : normal skin color and pigmentation [Skin Turgor] : normal skin turgor [No Focal Deficits] : no focal deficits [Oriented To Time, Place, And Person] : oriented to person, place, and time [Impaired Insight] : insight and judgment were intact [Affect] : the affect was normal [FreeTextEntry1] : multiple abdominal scars

## 2022-10-13 NOTE — HISTORY OF PRESENT ILLNESS
[80: Normal activity with effort; some signs or symptoms of disease.] : 80: Normal activity with effort; some signs or symptoms of disease.  [FreeTextEntry1] : Tube exchanged October 29th.  Pt diagnosed with Covid, wife was diagnosed with Covid on Jan 28th 2021.  On 29th pt was tested in an urgent care, tested positive and went to [place].  Had no symptoms.  Received the monoclonal antibody on the 29th.  Creatinine in 3's in [facility] and he was admitted and received 5 days of IV antibiotics for urine infection.\par \par Pt admitted June 2nd 2021 for CHELA and hyperkalemia.  Found to have UTI and treated.  Had pseudomonas in urine and received zosyn.  CHELA improved before discharge.  Creatinine decreased to 2.7 before discharge.  Stent internalized\par \par Receiving RT therapy for prostate. Has had admissions with urinary retention after RT started.  \par Saw an oncologist who recently performed labs and mentioned that creatinine was high.  Pt still has armenta catheter and was advised to have a TURP.  Pt will be seeing Dr. Collins this Thursday.  Had a positive urine culture end of September.  \par \par friend Dr. Estrada [facility] [phone].\par \par Pt was admitted to [facility] 11/5 for IV abx prior to planned TURP 11/8.  11/9/21 - s/p TURP and transplant kidney stent replacement.  Armenta removed. Pt voided very light pink about 250 cc. \par \par Pt has followed up with Dr. Phillip, urinating well, had 80 cc PVR.  \par \par Pt had anal surgery for fissure, saw Dr. Morrison - has pain but healing.  He is urinating well [de-identified] : Pt had some necrotic prostatic debris removed by Dr. Phillip.    Had been having some blood at the start of urination but has since stopped.  Will f/u with Dr. Phillip today.  Also has been having rectal pain and knee pain.  Has been following with colorectal and orthopedics.  Last creatinine 2.7.

## 2022-10-13 NOTE — ASSESSMENT
[FreeTextEntry1] : 1. CKD of kidney transplantation - Pts aaron creatinine 1.9 but had CHELA with pyelonephritis. Has had multiple episodes of CHELA.  Pt in process of relisting but on hold due to prostate cancer.  Cell free DNA 0.2% in October end.  Last creatinine 2.7.  Pt also has internal JJ stent being exchaged by Dr. Phillip.\par 2. Immunosuppression - simulect induction, tacrolimus target 5-7, MMF 250mgs BID.  \par 3. DM2 - on januvia and glimepiride. Januvia stopped due to high co-pays.   \par 4. HTN - controlled at home.  Has been taking nifedipine once daily. \par 5. HCV - genotype 3a. completed Epclusa. Last vl note detected. \par 6. Anemia - due to CKD/CHELA.  last Hb at goal. \par 7. Chronic diarrhea - on and off on lomotil prn. Has seen GI and had a colonoscopy in 2019. Has history of small bowel resection which may be the cause of diarrhea. 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 \par 9.  UTI - last culture consistent with contamination. \par 10.  COVID19 -  Resolved.  Received monoclonal antibody and recently vaccinated.  \par 11.  Prostate cancer - Completed androgen depletion and completed RT.  Last PSA was 0.02.\par 12.  Urine retention - now s/p TURP then removal of necrotic prostate debris.  He will f/u with Dr. Phillip.  \par 13.  Anal fissure s/p sugery -  Following with colorectal surgery. \par \par f/u in 3 months.

## 2022-10-14 LAB
ALBUMIN SERPL ELPH-MCNC: 4.4 G/DL
ALP BLD-CCNC: 108 U/L
ALT SERPL-CCNC: 12 U/L
ANION GAP SERPL CALC-SCNC: 11 MMOL/L
APPEARANCE: ABNORMAL
AST SERPL-CCNC: 34 U/L
BACTERIA: ABNORMAL
BASOPHILS # BLD AUTO: 0.01 K/UL
BASOPHILS NFR BLD AUTO: 0.2 %
BILIRUB SERPL-MCNC: 0.2 MG/DL
BILIRUBIN URINE: NEGATIVE
BKV DNA SPEC QL NAA+PROBE: NOT DETECTED IU/ML
BLOOD URINE: ABNORMAL
BUN SERPL-MCNC: 40 MG/DL
CALCIUM SERPL-MCNC: 10.4 MG/DL
CALCIUM SERPL-MCNC: 10.4 MG/DL
CHLORIDE SERPL-SCNC: 103 MMOL/L
CO2 SERPL-SCNC: 21 MMOL/L
COLOR: NORMAL
CREAT SERPL-MCNC: 2.57 MG/DL
CREAT SPEC-SCNC: 109 MG/DL
CREAT/PROT UR: 0.5 RATIO
EGFR: 26 ML/MIN/1.73M2
EOSINOPHIL # BLD AUTO: 0 K/UL
EOSINOPHIL NFR BLD AUTO: 0 %
GLUCOSE QUALITATIVE U: NORMAL
GLUCOSE SERPL-MCNC: 248 MG/DL
HCT VFR BLD CALC: 35.8 %
HGB BLD-MCNC: 11.6 G/DL
HYALINE CASTS: 0 /LPF
IMM GRANULOCYTES NFR BLD AUTO: 1.2 %
KETONES URINE: NEGATIVE
LDH SERPL-CCNC: 333 U/L
LEUKOCYTE ESTERASE URINE: ABNORMAL
LYMPHOCYTES # BLD AUTO: 1.48 K/UL
LYMPHOCYTES NFR BLD AUTO: 25.1 %
MAGNESIUM SERPL-MCNC: 1.6 MG/DL
MAN DIFF?: NORMAL
MCHC RBC-ENTMCNC: 30.6 PG
MCHC RBC-ENTMCNC: 32.4 GM/DL
MCV RBC AUTO: 94.5 FL
MICROSCOPIC-UA: NORMAL
MONOCYTES # BLD AUTO: 0.15 K/UL
MONOCYTES NFR BLD AUTO: 2.5 %
NEUTROPHILS # BLD AUTO: 4.18 K/UL
NEUTROPHILS NFR BLD AUTO: 71 %
NITRITE URINE: NEGATIVE
PARATHYROID HORMONE INTACT: 43 PG/ML
PH URINE: 6
PHOSPHATE SERPL-MCNC: 3 MG/DL
PLATELET # BLD AUTO: 210 K/UL
POTASSIUM SERPL-SCNC: 5.8 MMOL/L
PROT SERPL-MCNC: 7.3 G/DL
PROT UR-MCNC: 54 MG/DL
PROTEIN URINE: ABNORMAL
RBC # BLD: 3.79 M/UL
RBC # FLD: 15.4 %
RED BLOOD CELLS URINE: 13 /HPF
SODIUM SERPL-SCNC: 135 MMOL/L
SPECIFIC GRAVITY URINE: 1.02
SQUAMOUS EPITHELIAL CELLS: 0 /HPF
TACROLIMUS SERPL-MCNC: 5.8 NG/ML
URATE SERPL-MCNC: 7.7 MG/DL
UROBILINOGEN URINE: NORMAL
WBC # FLD AUTO: 5.89 K/UL
WHITE BLOOD CELLS URINE: 136 /HPF

## 2022-10-15 NOTE — PROGRESS NOTE ADULT - SUBJECTIVE AND OBJECTIVE BOX
Addended by: PENELOPE GARCIA on: 10/15/2022 12:23 PM     Modules accepted: Level of Service     [facility] DIVISION OF KIDNEY DISEASES AND HYPERTENSION -- FOLLOW UP NOTE  --------------------------------------------------------------------------------  Chief Complaint: CHELA  DDRT  7/2018    24 hour events/subjective:  Pt examined at bed side, feeling well, denies cp/sob, n/v/abdominal pain no headaches, minimal tremors, 1.1 lit of UOP. No events.       PAST HISTORY  --------------------------------------------------------------------------------  No significant changes to PMH, PSH, FHx, SHx, unless otherwise noted    ALLERGIES & MEDICATIONS  --------------------------------------------------------------------------------  Allergies    No Known Allergies    Intolerances      Standing Inpatient Medications  cefepime   IVPB 1000 milliGRAM(s) IV Intermittent every 24 hours  dextrose 50% Injectable 25 Gram(s) IV Push once  dextrose 50% Injectable 25 Gram(s) IV Push once  diphenoxylate/atropine 2 Tablet(s) Oral three times a day  furosemide   Injectable 60 milliGRAM(s) IV Push once  gabapentin 300 milliGRAM(s) Oral daily  ganciclovir IVPB 60 milliGRAM(s) IV Intermittent daily  heparin  Injectable 5000 Unit(s) SubCutaneous every 12 hours  insulin lispro (HumaLOG) corrective regimen sliding scale   SubCutaneous three times a day before meals  insulin lispro (HumaLOG) corrective regimen sliding scale   SubCutaneous at bedtime  metoprolol tartrate 50 milliGRAM(s) Oral daily  NIFEdipine XL 30 milliGRAM(s) Oral daily  predniSONE   Tablet 5 milliGRAM(s) Oral daily  sodium polystyrene sulfonate Suspension 15 Gram(s) Oral once  tacrolimus 2 milliGRAM(s) Oral two times a day  tamsulosin 0.4 milliGRAM(s) Oral at bedtime    PRN Inpatient Medications  chlorhexidine 4% Liquid 1 Application(s) Topical every 12 hours PRN  ondansetron Injectable 4 milliGRAM(s) IV Push every 6 hours PRN      REVIEW OF SYSTEMS  --------------------------------------------------------------------------------  Gen: No  fatigue, fevers/chills, weakness  Respiratory: No dyspnea, cough, wheezing, hemoptysis  CV: No chest pain, PND, orthopnea  GI: No abdominal pain, diarrhea, constipation, nausea, vomiting, melena, hematochezia  : No increased frequency, dysuria, hematuria, nocturia  MSK: no edema      All other systems were reviewed and are negative, except as noted.    VITALS/PHYSICAL EXAM  --------------------------------------------------------------------------------  T(C): 36.9 (05-05-19 @ 09:36), Max: 37.1 (05-04-19 @ 20:56)  HR: 75 (05-05-19 @ 09:36) (75 - 84)  BP: 125/63 (05-05-19 @ 09:36) (113/57 - 156/75)  RR: 18 (05-05-19 @ 09:36) (18 - 18)  SpO2: 96% (05-05-19 @ 09:36) (96% - 98%)  Wt(kg): --        05-04-19 @ 07:01  -  05-05-19 @ 07:00  --------------------------------------------------------  IN: 1420 mL / OUT: 1170 mL / NET: 250 mL    05-05-19 @ 07:01  -  05-05-19 @ 10:45  --------------------------------------------------------  IN: 240 mL / OUT: 200 mL / NET: 40 mL      Physical Exam:  	Gen: NAD,  	HEENT: supple neck, clear oropharynx  	Pulm: CTA B/L  	CV: RRR, S1S2; no rub  	Abd: +BS, soft, nontender/nondistended  	UE:  no edema; no asterixis  	LE: no edema  	Neuro: No focal deficits  	Vascular access: Left arm av fistula.     LABS/STUDIES  --------------------------------------------------------------------------------              9.0    3.9   >-----------<  142      [05-05-19 @ 06:30]              26.4     132  |  97  |  70  ----------------------------<  153      [05-05-19 @ 06:30]  5.4   |  20  |  6.63        Ca     8.7     [05-05-19 @ 06:30]      Mg     2.0     [05-05-19 @ 06:30]      Phos  5.5     [05-05-19 @ 06:30]        PTT: 31.8       [05-05-19 @ 06:30]      Creatinine Trend:  SCr 6.63 [05-05 @ 06:30]  SCr 6.87 [05-04 @ 16:49]  SCr 6.66 [05-04 @ 15:40]  SCr 7.03 [05-04 @ 06:02]  SCr 6.96 [05-04 @ 00:32] [facility] DIVISION OF KIDNEY DISEASES AND HYPERTENSION -- FOLLOW UP NOTE  --------------------------------------------------------------------------------  Chief Complaint: CHELA  DDRT  7/2018    24 hour events/subjective:  Pt examined at bed side, feeling well, denies cp/sob, n/v/abdominal pain no headaches, minimal tremors, 1.1 lit of UOP. No events.       PAST HISTORY  --------------------------------------------------------------------------------  No significant changes to PMH, PSH, FHx, SHx, unless otherwise noted    ALLERGIES & MEDICATIONS  --------------------------------------------------------------------------------  Allergies    No Known Allergies    Intolerances      Standing Inpatient Medications  cefepime   IVPB 1000 milliGRAM(s) IV Intermittent every 24 hours  dextrose 50% Injectable 25 Gram(s) IV Push once  dextrose 50% Injectable 25 Gram(s) IV Push once  diphenoxylate/atropine 2 Tablet(s) Oral three times a day  furosemide   Injectable 60 milliGRAM(s) IV Push once  gabapentin 300 milliGRAM(s) Oral daily  ganciclovir IVPB 60 milliGRAM(s) IV Intermittent daily  heparin  Injectable 5000 Unit(s) SubCutaneous every 12 hours  insulin lispro (HumaLOG) corrective regimen sliding scale   SubCutaneous three times a day before meals  insulin lispro (HumaLOG) corrective regimen sliding scale   SubCutaneous at bedtime  metoprolol tartrate 50 milliGRAM(s) Oral daily  NIFEdipine XL 30 milliGRAM(s) Oral daily  predniSONE   Tablet 5 milliGRAM(s) Oral daily  sodium polystyrene sulfonate Suspension 15 Gram(s) Oral once  tacrolimus 2 milliGRAM(s) Oral two times a day  tamsulosin 0.4 milliGRAM(s) Oral at bedtime    PRN Inpatient Medications  chlorhexidine 4% Liquid 1 Application(s) Topical every 12 hours PRN  ondansetron Injectable 4 milliGRAM(s) IV Push every 6 hours PRN      REVIEW OF SYSTEMS  --------------------------------------------------------------------------------  Gen: No  fatigue, fevers/chills, weakness  Respiratory: No dyspnea, cough, wheezing, hemoptysis  CV: No chest pain, PND, orthopnea  GI: No abdominal pain, diarrhea, constipation, nausea, vomiting, melena, hematochezia  : No increased frequency, dysuria, hematuria, nocturia  MSK: no edema      All other systems were reviewed and are negative, except as noted.    VITALS/PHYSICAL EXAM  --------------------------------------------------------------------------------  T(C): 36.9 (05-05-19 @ 09:36), Max: 37.1 (05-04-19 @ 20:56)  HR: 75 (05-05-19 @ 09:36) (75 - 84)  BP: 125/63 (05-05-19 @ 09:36) (113/57 - 156/75)  RR: 18 (05-05-19 @ 09:36) (18 - 18)  SpO2: 96% (05-05-19 @ 09:36) (96% - 98%)  Wt(kg): --        05-04-19 @ 07:01  -  05-05-19 @ 07:00  --------------------------------------------------------  IN: 1420 mL / OUT: 1170 mL / NET: 250 mL    05-05-19 @ 07:01  -  05-05-19 @ 10:45  --------------------------------------------------------  IN: 240 mL / OUT: 200 mL / NET: 40 mL      Physical Exam:  	Gen: NAD,  	HEENT: supple neck, clear oropharynx  	Pulm: CTA B/L  	CV: RRR, S1S2; no rub  	Abd: +BS, soft, nontender/nondistended  	UE:  no edema; no asterixis  	LE: no edema  	Neuro: No focal deficits  	Vascular access: Left arm av fistula, improving swelling.     LABS/STUDIES  --------------------------------------------------------------------------------              9.0    3.9   >-----------<  142      [05-05-19 @ 06:30]              26.4     132  |  97  |  70  ----------------------------<  153      [05-05-19 @ 06:30]  5.4   |  20  |  6.63        Ca     8.7     [05-05-19 @ 06:30]      Mg     2.0     [05-05-19 @ 06:30]      Phos  5.5     [05-05-19 @ 06:30]        PTT: 31.8       [05-05-19 @ 06:30]      Creatinine Trend:  SCr 6.63 [05-05 @ 06:30]  SCr 6.87 [05-04 @ 16:49]  SCr 6.66 [05-04 @ 15:40]  SCr 7.03 [05-04 @ 06:02]  SCr 6.96 [05-04 @ 00:32]

## 2022-10-16 LAB
APPEARANCE: ABNORMAL
BACTERIA UR CULT: ABNORMAL
BACTERIA: ABNORMAL
BILIRUBIN URINE: NEGATIVE
BLOOD URINE: ABNORMAL
COLOR: NORMAL
GLUCOSE QUALITATIVE U: NEGATIVE
HYALINE CASTS: 0 /LPF
KETONES URINE: NEGATIVE
LEUKOCYTE ESTERASE URINE: ABNORMAL
MICROSCOPIC-UA: NORMAL
NITRITE URINE: NEGATIVE
PH URINE: 6
PROTEIN URINE: ABNORMAL
RED BLOOD CELLS URINE: 2 /HPF
SPECIFIC GRAVITY URINE: 1.02
SQUAMOUS EPITHELIAL CELLS: 0 /HPF
UROBILINOGEN URINE: NORMAL
WHITE BLOOD CELLS URINE: 184 /HPF

## 2022-10-17 LAB
CMV DNA SPEC QL NAA+PROBE: NOT DETECTED IU/ML
CMVPCR LOG: NOT DETECTED LOG10IU/ML

## 2022-10-28 ENCOUNTER — NON-APPOINTMENT (OUTPATIENT)
Age: 72
End: 2022-10-28

## 2022-10-28 ENCOUNTER — APPOINTMENT (OUTPATIENT)
Dept: RADIATION ONCOLOGY | Facility: CLINIC | Age: 72
End: 2022-10-28

## 2022-10-28 VITALS
OXYGEN SATURATION: 98 % | WEIGHT: 219.47 LBS | RESPIRATION RATE: 16 BRPM | HEIGHT: 70 IN | BODY MASS INDEX: 31.42 KG/M2 | SYSTOLIC BLOOD PRESSURE: 136 MMHG | DIASTOLIC BLOOD PRESSURE: 66 MMHG | HEART RATE: 79 BPM | TEMPERATURE: 97.7 F

## 2022-10-28 PROCEDURE — 99213 OFFICE O/P EST LOW 20 MIN: CPT

## 2022-10-28 NOTE — REVIEW OF SYSTEMS
[Nocturia] : nocturia [Urinary Frequency] : urinary frequency [IPSS Score (0-40): ___] : IPSS score: [unfilled] [EPIC-CP Score (0-60): ___] : EPIC-CP score: [unfilled] [Proctitis: Grade 1 - Rectal discomfort, intervention not indicated] : Proctitis: Grade 1 - Rectal discomfort, intervention not indicated [Rectal Pain: Grade 1 - Mild pain] : Rectal Pain: Grade 1 - Mild pain [Fatigue: Grade 1 - Fatigue relieved by rest] : Fatigue: Grade 1 - Fatigue relieved by rest [Hematuria: Grade 0] : Hematuria: Grade 0 [Urinary Retention: Grade 0] : Urinary Retention: Grade 0 [Urinary Tract Pain: Grade 0] : Urinary Tract Pain: Grade 0 [Urinary Urgency: Grade 1 - Present] : Urinary Urgency: Grade 1 - Present [Anal Pain: Grade 1 - Mild pain] : Anal Pain: Grade 1 - Mild pain [Constipation: Grade 0] : Constipation: Grade 0 [Diarrhea: Grade 0] : Diarrhea: Grade 0 [FreeTextEntry2] : leakage

## 2022-10-28 NOTE — HISTORY OF PRESENT ILLNESS
[FreeTextEntry1] : Mr. Medrano is a 73 y/o male with h/o kidney transplant (bilateral nephrectomy) in 2018, HTN, DM2 with prostate cancer.   He is being seen in follow up visit.   \par \par Diagnosis: 3/16/21 Unfavorable intermediate risk prostate cancer, G 4+3=7, PSA 5.27 ng/mL, MRI T2N0\par \par Radiation Treatment: Dr Calderon\par Treatment Site: Prostate/SV   7,000 cGy from 7/09/2021 - 8/27/2021 with ADT\par Clinical Response: Tolerated the treatment well. \par Orgovyx delivered by Dr Phillip \par \par PSA -  \par 5/25/18 -  3.41 ng/mL \par 10/26/20 - 4.29\par 12/7/20 - 5.27\par 10/20/21 - 0.01\par 1/19/22 - 0.03\par 10/6/22 - 0.02\par \par Interval Labs/Imaging/Procedures: \par 1/7/22 - US Transplant Kidney: Mild hydronephrosis, increased compared to prior study. Ureteral stent. Redemonstrated urothelial thickening. Elevated resistive indices, similar to prior examinations. 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 Kumar (colorectal)\par \par \par 04/20/2022 - He presented for routine follow-up.  He continues to follow with Dr. Phillip (urology).  \par Mr. Gay is complaining of rectal pain, s/p procedure on 4/5/22... taking Tylenol prn.  Urinary symptoms are stable, has some issues with weak stream and dribbling at times.   IPSS 9 / EPIC 23\par \par 8/15/22 - underwent TURP, stent exchange with Dr. Phillip (urology).  Pathology - Prostate chips, necrotic tissue \par \par 10/28/22 - presents for follow up visit.  Reports urinary leakage unchanged. Denies dysuria. Reports urinary frequency, unchanged. Had episode of hematuria which resolved. Found to be a UTI treated with cipro. Denies any other episode. Reports proctitis unchanged. \par

## 2022-10-28 NOTE — PHYSICAL EXAM
[Normal] : well developed, well nourished, in no acute distress [Sclera] : the sclera and conjunctiva were normal [Outer Ear] : the ears and nose were normal in appearance [] : no respiratory distress

## 2022-11-08 NOTE — ED ADULT TRIAGE NOTE - PATIENT ON (OXYGEN DELIVERY METHOD)
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 Patient reports 1 sexual partner in the past month. Partner does not have HIV/no STIs\par \par * Blood transfusion: no\par * Tattoo: Yes professionally done\par * Piercing: yes professionally done\par * IVDA: no\par * Sex for drugs/money: no\par * No tobacco use, social alcohol use, no marijuana use, no other substances\par \par STI history:  no\par Previous HIV testing: yes, 2 months ago negative\par Meets men via: bars, friends, apps in past\par \par PrEP in the past? no\par \par Patient had fever 15 days ago d/t flu, bodyaches, phlegm - cough. Tested negative for covid. Feeling better now.\par \par No signs/symptoms of acute HIV. No fever, myalgias, throat pain, meningismus.\par COVID-19 Symptom screening: no fever, nasal congestion, cough, sob, loss of smell/taste, or diarrhea. \par \par Monkeypox vaccine: received first dose, in june-july through [facility]. Patient interested in getting second dose.\par \par Patient reports relatively healthy over the years, just had asthma taking nebulizer as needed when sick. Pt denies any SOB, cough, chest pain, palpitations, N/V/D/C, fever, or chills. No other health concerns today.  Detail Level: Generalized room air

## 2022-11-29 NOTE — ED PROVIDER NOTE - ENMT NEGATIVE STATEMENT, MLM
[facility], lives about [place] minutes away in [facility], desires IOL. Will proceed, r/b/a d/w her. Ears: no ear pain and no hearing problems. Nose: no nasal congestion and no nasal drainage. Mouth/Throat: no dysphagia, no hoarseness and no throat pain. Neck: no lumps, no pain, no stiffness and no swollen glands.

## 2023-01-02 NOTE — DISCHARGE NOTE PROVIDER - NSDCQMCOGNITION_NEU_ALL_CORE
No difficulties
Patient presented with fever and cough x 4 days. Otherwise HD stable, well appearing, no acute respiratory distress on RA. Lungs clear. No meningeal signs or petechiae/rash, no concern for strep pharyngitis based on centor criteria, neuro intact, TMs clear, abdomen non-tender.  Patient remained stable on RA, and able to ambulate without difficulty or desaturation. Likely viral etiology. Given the above, will discharge home with outpatient follow up. Patient agreeable with plan. Agrees to return to ED immediately for any new or worsening symptoms.

## 2023-01-18 ENCOUNTER — APPOINTMENT (OUTPATIENT)
Dept: ORTHOPEDIC SURGERY | Facility: CLINIC | Age: 73
End: 2023-01-18
Payer: MEDICARE

## 2023-01-18 VITALS — HEIGHT: 70 IN | WEIGHT: 218 LBS | BODY MASS INDEX: 31.21 KG/M2

## 2023-01-18 PROCEDURE — 99214 OFFICE O/P EST MOD 30 MIN: CPT | Mod: 25

## 2023-01-18 PROCEDURE — 20611 DRAIN/INJ JOINT/BURSA W/US: CPT

## 2023-01-18 NOTE — HISTORY OF PRESENT ILLNESS
[Gradual] : gradual [5] : 5 [0] : 0 [Dull/Aching] : dull/aching [Localized] : localized [Throbbing] : throbbing [Constant] : constant [Nothing helps with pain getting better] : Nothing helps with pain getting better [Walking] : walking [Stairs] : stairs [de-identified] : 72M p/w sam knee pain L>R today. Has known advanced bilateral knee OA. Has tried visco and CSI which help temporarily. he has tried PT and NSAIDs previously. He is unsure if he is ready for TKA at this time.\par \par 1/18/23: f/u sam knees, still having pain and weakness in both his knees, considering TKA [] : no [FreeTextEntry1] : left knee [FreeTextEntry3] : few months  [FreeTextEntry5] : JUAN CARLOS COPELAND is a 72 year old M here for a follow up for the left knee. Pt states that he's doing about the same since his last visit. Mentions that he feels weakness while climbing stairs.  [de-identified] : activity  [de-identified] : 9/30/22 [de-identified] : Dr Napoles  [de-identified] : XR

## 2023-01-18 NOTE — ASSESSMENT
[FreeTextEntry1] : 72M p/w adv sam knee OA\par \par  euflexxa 1 tolerated well, we discussed r b a to TKA and the post op expectations, he would like to consider his options, he will return1 week to continue\par \par The risks, benefits, contents and alternatives to injection were explained in full to the patient. Risks outlined include but are not limited to infection, sepsis, bleeding, scarring, skin discoloration, temporary increase in pain, syncopal episode, failure to resolve symptoms, allergic reaction, flare reaction, permanent white skin discoloration, symptom recurrence, and elevation of blood sugar in diabetics. Patient understood the risks. All questions were answered. After discussion of options, patient requested an injection. Oral informed consent was obtained and sterile prep was done of the injection site. Sterile technique was used to introduce the mixture. Patient tolerated the procedure well. Patient advised to ice the injection site this evening. Signs and symptoms of infection reviewed and patient advised to call immediately for redness, fevers, and/or chills.\par

## 2023-01-18 NOTE — PROCEDURE
[Left] : of the left [Knee] : knee [Pain] : pain [Inflammation] : inflammation [X-ray evidence of Osteoarthritis on this or prior visit] : x-ray evidence of Osteoarthritis on this or prior visit [Alcohol] : alcohol [Betadine] : betadine [Ethyl Chloride sprayed topically] : ethyl chloride sprayed topically [Sterile technique used] : sterile technique used [Call if redness, pain or fever occur] : call if redness, pain or fever occur [Apply ice for 15min out of every hour for the next 12-24 hours as tolerated] : apply ice for 15 minutes out of every hour for the next 12-24 hours as tolerated [Previous OTC use and PT nontherapeutic] : patient has tried OTC's including aspirin, Ibuprofen, Aleve, etc or prescription NSAIDS, and/or exercises at home and/or physical therapy without satisfactory response [Patient had decreased mobility in the joint] : patient had decreased mobility in the joint [Risks, benefits, alternatives discussed / Verbal consent obtained] : the risks benefits, and alternatives have been discussed, and verbal consent was obtained [Euflexxa(20mg)] : 20mg of Euflexxa [#1] : series #1 [Large Joint Injection] : Large joint injection [Effusion] : effusion [All ultrasound images have been permanently captured and stored accordingly in our picture archiving and communication system] : All ultrasound images have been permanently captured and stored accordingly in our picture archiving and communication system [Visualization of the needle and placement of injection was performed without complication] : visualization of the needle and placement of injection was performed without complication [de-identified] : 15cc [de-identified] : straw colored

## 2023-01-18 NOTE — PHYSICAL EXAM
[Normal Sensation] : normal sensation [Normal Mood and Affect] : normal mood and affect [Orientated] : orientated [Left] : left knee [NL (0)] : extension 0 degrees [5___] : quadriceps 5[unfilled]/5 [Right] : right knee [AP] : anteroposterior [Lateral] : lateral [[place]] : skyline [AP Standing] : anteroposterior standing [There are no fractures, subluxations or dislocations. No significant abnormalities are seen] : There are no fractures, subluxations or dislocations. No significant abnormalities are seen [advanced tricompartmental OA with medial compartment narrowing and varus alignment] : advanced tricompartmental OA with medial compartment narrowing and varus alignment [Advanced patellofemoral OA] : Advanced patellofemoral OA [] : no extensor lag [de-identified] : extension 0 degrees [TWNoteComboBox7] : flexion 120 degrees

## 2023-01-19 ENCOUNTER — APPOINTMENT (OUTPATIENT)
Dept: NEPHROLOGY | Facility: CLINIC | Age: 73
End: 2023-01-19
Payer: MEDICARE

## 2023-01-19 VITALS
OXYGEN SATURATION: 98 % | SYSTOLIC BLOOD PRESSURE: 147 MMHG | HEART RATE: 70 BPM | BODY MASS INDEX: 30.64 KG/M2 | HEIGHT: 70 IN | DIASTOLIC BLOOD PRESSURE: 72 MMHG | WEIGHT: 214 LBS | RESPIRATION RATE: 16 BRPM

## 2023-01-19 PROCEDURE — 99214 OFFICE O/P EST MOD 30 MIN: CPT

## 2023-01-19 RX ORDER — CIPROFLOXACIN HYDROCHLORIDE 500 MG/1
500 TABLET, FILM COATED ORAL
Qty: 10 | Refills: 0 | Status: DISCONTINUED | COMMUNITY
Start: 2022-10-16 | End: 2023-01-19

## 2023-01-19 NOTE — HISTORY OF PRESENT ILLNESS
[80: Normal activity with effort; some signs or symptoms of disease.] : 80: Normal activity with effort; some signs or symptoms of disease.  [FreeTextEntry1] : Tube exchanged October 29th.  Pt diagnosed with Covid, wife was diagnosed with Covid on Jan 28th 2021.  On 29th pt was tested in an urgent care, tested positive and went to [place].  Had no symptoms.  Received the monoclonal antibody on the 29th.  Creatinine in 3's in [facility] and he was admitted and received 5 days of IV antibiotics for urine infection.\par \par Pt admitted June 2nd 2021 for CHELA and hyperkalemia.  Found to have UTI and treated.  Had pseudomonas in urine and received zosyn.  CHELA improved before discharge.  Creatinine decreased to 2.7 before discharge.  Stent internalized\par \par Receiving RT therapy for prostate. Has had admissions with urinary retention after RT started.  \par Saw an oncologist who recently performed labs and mentioned that creatinine was high.  Pt still has armenta catheter and was advised to have a TURP.  Pt will be seeing Dr. Collins this Thursday.  Had a positive urine culture end of September.  \par \par friend Dr. Sean Salinas [phone].\par \par Pt was admitted to [facility] 11/5 for IV abx prior to planned TURP 11/8.  11/9/21 - s/p TURP and transplant kidney stent replacement.  Armenta removed. Pt voided very light pink about 250 cc. \par \par Pt has followed up with Dr. Phillip\[facility]  [de-identified] : Last creatinine 2.57.  He still has some anal pain but not bad today.  Has knee pain but better after gel injection.  Urinating ok, does not feel like he is retaining.  No dysuria.

## 2023-01-19 NOTE — ASSESSMENT
[FreeTextEntry1] : 1. CKD of kidney transplantation - Pts aaron creatinine 1.9 but had CHELA with pyelonephritis. Has had multiple episodes of CHELA.  Pt in process of relisting but was on hold due to prostate cancer and now GFR too high.  Cell free DNA 0.2% in October end.  Last creatinine 2.7.  Pt also has internal JJ stent being exchanged by Dr. Phillip.  Next exchange in August 2023. \par 2. Immunosuppression - simulect induction, tacrolimus target 5-7, MMF 250mgs BID.  \par 3. DM2 - on januvia and glimepiride. Januvia stopped due to high co-pays.   \par 4. HTN - controlled at home.  Has been taking nifedipine once daily. \par 5. HCV - genotype 3a. completed Epclusa. Last vl note detected. \par 6. Anemia - due to CKD/CHELA.  last Hb at goal. \par 7. Chronic diarrhea - on and off on lomotil prn. Has seen GI and had a colonoscopy in 2019. Has history of small bowel resection which may be the cause of diarrhea. 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 \par 9.  UTI - last culture consistent with contamination. \par 10.  COVID19 -  Resolved.  Received monoclonal antibody and recently vaccinated.  \par 11.  Prostate cancer - Completed androgen depletion and completed RT.  Last PSA was 0.02.\par 12.  Urine retention - now s/p TURP then removal of necrotic prostate debris.  He will f/u with Dr. Phillip.  \par 13.  Anal fissure s/p surgery -  Following with colorectal surgery. \par \par f/u in 3 months.

## 2023-01-20 ENCOUNTER — NON-APPOINTMENT (OUTPATIENT)
Age: 73
End: 2023-01-20

## 2023-01-20 LAB
ALBUMIN SERPL ELPH-MCNC: 4.2 G/DL
ALP BLD-CCNC: 92 U/L
ALT SERPL-CCNC: 12 U/L
ANION GAP SERPL CALC-SCNC: 12 MMOL/L
APPEARANCE: ABNORMAL
AST SERPL-CCNC: 17 U/L
BACTERIA: ABNORMAL
BASOPHILS # BLD AUTO: 0.04 K/UL
BASOPHILS NFR BLD AUTO: 0.6 %
BILIRUB SERPL-MCNC: 0.3 MG/DL
BILIRUBIN URINE: NEGATIVE
BKV DNA SPEC QL NAA+PROBE: NOT DETECTED IU/ML
BLOOD URINE: ABNORMAL
BUN SERPL-MCNC: 31 MG/DL
CALCIUM SERPL-MCNC: 10.4 MG/DL
CALCIUM SERPL-MCNC: 10.4 MG/DL
CHLORIDE SERPL-SCNC: 104 MMOL/L
CMV DNA SPEC QL NAA+PROBE: NOT DETECTED IU/ML
CMVPCR LOG: NOT DETECTED LOG10IU/ML
CO2 SERPL-SCNC: 21 MMOL/L
COLOR: YELLOW
CREAT SERPL-MCNC: 2.63 MG/DL
CREAT SPEC-SCNC: 193 MG/DL
CREAT/PROT UR: 0.2 RATIO
EGFR: 25 ML/MIN/1.73M2
EOSINOPHIL # BLD AUTO: 0.08 K/UL
EOSINOPHIL NFR BLD AUTO: 1.2 %
GLUCOSE QUALITATIVE U: NEGATIVE
GLUCOSE SERPL-MCNC: 139 MG/DL
HCT VFR BLD CALC: 36.6 %
HGB BLD-MCNC: 11.8 G/DL
HYALINE CASTS: 1 /LPF
IMM GRANULOCYTES NFR BLD AUTO: 1.1 %
KETONES URINE: NEGATIVE
LDH SERPL-CCNC: 146 U/L
LEUKOCYTE ESTERASE URINE: ABNORMAL
LYMPHOCYTES # BLD AUTO: 2.41 K/UL
LYMPHOCYTES NFR BLD AUTO: 36.7 %
MAGNESIUM SERPL-MCNC: 1.5 MG/DL
MAN DIFF?: NORMAL
MCHC RBC-ENTMCNC: 32.2 GM/DL
MCHC RBC-ENTMCNC: 32.6 PG
MCV RBC AUTO: 101.1 FL
MICROSCOPIC-UA: NORMAL
MONOCYTES # BLD AUTO: 0.43 K/UL
MONOCYTES NFR BLD AUTO: 6.6 %
NEUTROPHILS # BLD AUTO: 3.53 K/UL
NEUTROPHILS NFR BLD AUTO: 53.8 %
NITRITE URINE: NEGATIVE
PARATHYROID HORMONE INTACT: 43 PG/ML
PH URINE: 5.5
PHOSPHATE SERPL-MCNC: 3.8 MG/DL
PLATELET # BLD AUTO: 164 K/UL
POTASSIUM SERPL-SCNC: 5 MMOL/L
PROT SERPL-MCNC: 6.9 G/DL
PROT UR-MCNC: 47 MG/DL
PROTEIN URINE: ABNORMAL
RBC # BLD: 3.62 M/UL
RBC # FLD: 13.8 %
RED BLOOD CELLS URINE: 29 /HPF
SODIUM SERPL-SCNC: 136 MMOL/L
SPECIFIC GRAVITY URINE: 1.02
SQUAMOUS EPITHELIAL CELLS: 1 /HPF
TACROLIMUS SERPL-MCNC: 8.4 NG/ML
URINE COMMENTS: NORMAL
UROBILINOGEN URINE: NORMAL
WBC # FLD AUTO: 6.56 K/UL
WHITE BLOOD CELLS URINE: 252 /HPF

## 2023-02-01 ENCOUNTER — APPOINTMENT (OUTPATIENT)
Dept: ORTHOPEDIC SURGERY | Facility: CLINIC | Age: 73
End: 2023-02-01
Payer: MEDICARE

## 2023-02-01 VITALS — BODY MASS INDEX: 30.64 KG/M2 | WEIGHT: 214 LBS | HEIGHT: 70 IN

## 2023-02-01 PROCEDURE — 99213 OFFICE O/P EST LOW 20 MIN: CPT | Mod: 25

## 2023-02-01 PROCEDURE — 20611 DRAIN/INJ JOINT/BURSA W/US: CPT

## 2023-02-01 NOTE — PHYSICAL EXAM
[Normal Sensation] : normal sensation [Normal Mood and Affect] : normal mood and affect [Orientated] : orientated [Left] : left knee [NL (0)] : extension 0 degrees [5___] : quadriceps 5[unfilled]/5 [Right] : right knee [AP] : anteroposterior [Lateral] : lateral [[place]] : skyline [AP Standing] : anteroposterior standing [There are no fractures, subluxations or dislocations. No significant abnormalities are seen] : There are no fractures, subluxations or dislocations. No significant abnormalities are seen [advanced tricompartmental OA with medial compartment narrowing and varus alignment] : advanced tricompartmental OA with medial compartment narrowing and varus alignment [Advanced patellofemoral OA] : Advanced patellofemoral OA [] : no extensor lag [de-identified] : extension 0 degrees [TWNoteComboBox7] : flexion 120 degrees

## 2023-02-01 NOTE — PROCEDURE
[Large Joint Injection] : Large joint injection [Left] : of the left [Knee] : knee [Pain] : pain [Inflammation] : inflammation [X-ray evidence of Osteoarthritis on this or prior visit] : x-ray evidence of Osteoarthritis on this or prior visit [Alcohol] : alcohol [Betadine] : betadine [Ethyl Chloride sprayed topically] : ethyl chloride sprayed topically [Sterile technique used] : sterile technique used [Euflexxa(20mg)] : 20mg of Euflexxa [Call if redness, pain or fever occur] : call if redness, pain or fever occur [Apply ice for 15min out of every hour for the next 12-24 hours as tolerated] : apply ice for 15 minutes out of every hour for the next 12-24 hours as tolerated [Previous OTC use and PT nontherapeutic] : patient has tried OTC's including aspirin, Ibuprofen, Aleve, etc or prescription NSAIDS, and/or exercises at home and/or physical therapy without satisfactory response [Patient had decreased mobility in the joint] : patient had decreased mobility in the joint [Risks, benefits, alternatives discussed / Verbal consent obtained] : the risks benefits, and alternatives have been discussed, and verbal consent was obtained [All ultrasound images have been permanently captured and stored accordingly in our picture archiving and communication system] : All ultrasound images have been permanently captured and stored accordingly in our picture archiving and communication system [Visualization of the needle and placement of injection was performed without complication] : visualization of the needle and placement of injection was performed without complication [#2] : series #2 [Effusion] : effusion [] : Patient tolerated procedure well [de-identified] : 30 cc [de-identified] : straw colored

## 2023-02-01 NOTE — HISTORY OF PRESENT ILLNESS
[Gradual] : gradual [Dull/Aching] : dull/aching [Localized] : localized [Throbbing] : throbbing [Constant] : constant [Nothing helps with pain getting better] : Nothing helps with pain getting better [Walking] : walking [Stairs] : stairs [4] : 4 [3] : 3 [2] : 2 [Euflexxa] : Euflexxa [de-identified] : 72M p/w sam knee pain L>R today. Has known advanced bilateral knee OA. Has tried visco and CSI which help temporarily. he has tried PT and NSAIDs previously. He is unsure if he is ready for TKA at this time.\par \par 1/18/23: f/u sam knees, still having pain and weakness in both his knees, considering TKA\par 2/1/23: Here for Euflexxa #2 L knee  [] : no [FreeTextEntry1] : left knee [FreeTextEntry3] : few months  [FreeTextEntry5] : JUAN CARLOS COPELAND is a 72 year old M here for a follow up for the left knee. Pt states that he's doing about the same since his last visit. Mentions that he feels weakness while climbing stairs. He has gotten some relief from injection  [de-identified] : activity  [de-identified] : 9/30/22 [de-identified] : Dr Napoles  [de-identified] : XR [TWNoteComboBox1] : 30%

## 2023-02-08 ENCOUNTER — APPOINTMENT (OUTPATIENT)
Dept: ORTHOPEDIC SURGERY | Facility: CLINIC | Age: 73
End: 2023-02-08
Payer: MEDICARE

## 2023-02-08 VITALS — HEIGHT: 70 IN | BODY MASS INDEX: 30.64 KG/M2 | WEIGHT: 214 LBS

## 2023-02-08 PROCEDURE — 99214 OFFICE O/P EST MOD 30 MIN: CPT | Mod: 25

## 2023-02-08 PROCEDURE — 20610 DRAIN/INJ JOINT/BURSA W/O US: CPT | Mod: LT

## 2023-02-08 NOTE — PHYSICAL EXAM
[Normal Sensation] : normal sensation [Normal Mood and Affect] : normal mood and affect [Orientated] : orientated [Left] : left knee [NL (0)] : extension 0 degrees [5___] : quadriceps 5[unfilled]/5 [] : patient ambulates without assistive device [Right] : right knee [AP] : anteroposterior [Lateral] : lateral [[place]] : skyline [AP Standing] : anteroposterior standing [There are no fractures, subluxations or dislocations. No significant abnormalities are seen] : There are no fractures, subluxations or dislocations. No significant abnormalities are seen [advanced tricompartmental OA with medial compartment narrowing and varus alignment] : advanced tricompartmental OA with medial compartment narrowing and varus alignment [Advanced patellofemoral OA] : Advanced patellofemoral OA [de-identified] : extension 0 degrees [TWNoteComboBox7] : flexion 120 degrees

## 2023-02-08 NOTE — HISTORY OF PRESENT ILLNESS
[4] : 4 [2] : 2 [Dull/Aching] : dull/aching [] : This patient has had an injection before: yes [3] : 3 [Euflexxa] : Euflexxa [de-identified] : 72M p/w sam knee pain L>R today. Has known advanced bilateral knee OA. Has tried visco and CSI which help temporarily. he has tried PT and NSAIDs previously. He is unsure if he is ready for TKA at this time.\par \par 1/18/23: f/u sam knees, still having pain and weakness in both his knees, considering TKA\par 2/1/23: Here for Euflexxa #2 L knee \par 2/8/23 euflexxa 3 L knee improving [FreeTextEntry1] : left knee [de-identified] : no [de-identified] : 02/01/23

## 2023-02-08 NOTE — PROCEDURE
[Large Joint Injection] : Large joint injection [Left] : of the left [Knee] : knee [Pain] : pain [Inflammation] : inflammation [X-ray evidence of Osteoarthritis on this or prior visit] : x-ray evidence of Osteoarthritis on this or prior visit [Alcohol] : alcohol [Betadine] : betadine [Ethyl Chloride sprayed topically] : ethyl chloride sprayed topically [Sterile technique used] : sterile technique used [Euflexxa(20mg)] : 20mg of Euflexxa [#3] : series #3 [Call if redness, pain or fever occur] : call if redness, pain or fever occur [Apply ice for 15min out of every hour for the next 12-24 hours as tolerated] : apply ice for 15 minutes out of every hour for the next 12-24 hours as tolerated [Previous OTC use and PT nontherapeutic] : patient has tried OTC's including aspirin, Ibuprofen, Aleve, etc or prescription NSAIDS, and/or exercises at home and/or physical therapy without satisfactory response [Patient had decreased mobility in the joint] : patient had decreased mobility in the joint [Risks, benefits, alternatives discussed / Verbal consent obtained] : the risks benefits, and alternatives have been discussed, and verbal consent was obtained [All ultrasound images have been permanently captured and stored accordingly in our picture archiving and communication system] : All ultrasound images have been permanently captured and stored accordingly in our picture archiving and communication system [Visualization of the needle and placement of injection was performed without complication] : visualization of the needle and placement of injection was performed without complication

## 2023-02-08 NOTE — ASSESSMENT
[FreeTextEntry1] : 72M p/w adv sam knee OA\par \par euflexxa 3 tolerated well\par return 6 weeks\par \par The risks, benefits, contents and alternatives to injection were explained in full to the patient. Risks outlined include but are not limited to infection, sepsis, bleeding, scarring, skin discoloration, temporary increase in pain, syncopal episode, failure to resolve symptoms, allergic reaction, flare reaction, permanent white skin discoloration, symptom recurrence, and elevation of blood sugar in diabetics. Patient understood the risks. All questions were answered. After discussion of options, patient requested an injection. Oral informed consent was obtained and sterile prep was done of the injection site. Sterile technique was used to introduce the mixture. Patient tolerated the procedure well. Patient advised to ice the injection site this evening. Signs and symptoms of infection reviewed and patient advised to call immediately for redness, fevers, and/or chills.\par

## 2023-02-16 ENCOUNTER — APPOINTMENT (OUTPATIENT)
Dept: UROLOGY | Facility: CLINIC | Age: 73
End: 2023-02-16
Payer: MEDICARE

## 2023-02-16 VITALS
HEART RATE: 71 BPM | DIASTOLIC BLOOD PRESSURE: 71 MMHG | TEMPERATURE: 97.2 F | RESPIRATION RATE: 17 BRPM | SYSTOLIC BLOOD PRESSURE: 148 MMHG

## 2023-02-16 PROCEDURE — 99214 OFFICE O/P EST MOD 30 MIN: CPT

## 2023-02-17 NOTE — ASSESSMENT
[FreeTextEntry1] : I rechecked his PSA level today to reassess the prostate cancer.  His level remains at 0.02 ng/mL, stable since his radiation therapy.  I would recommend this continue to be followed on a regular basis and suggest that his next PSA level be in about 6 months.\par \par At this point he seems to be asymptomatic from a lower urinary tract standpoint.  There is no urgency or frequency and no dysuria.  No foul odor and no discoloration or opacity of the urine.  I would not recommend a urine culture in this setting as it would unlikely change any management.  He does know the signs and symptoms of infection which we have again reviewed today.  If he does develop any such symptoms I asked him to contact me.\par \par He will be due for an exchange of his ureteral stent in the transplant kidney as he does have ureteral obstruction.  I will make an arrangement for procedure at that time and have asked him to come back in July of this year for preoperative urine culture.

## 2023-02-17 NOTE — HISTORY OF PRESENT ILLNESS
[FreeTextEntry1] : Damir Medrano returns to the office today.  He is a 72-year-old male with history of prostate cancer treated with radiation therapy.  He had developed urinary retention after starting radiation and I ultimately performed a transurethral resection of the prostate which has been successful in eliminating his need for prior catheters.  Although he has been free of the catheters, he has periodically still developed issues of urinary tract infection although much less frequent than before and if he does get a urinary tract infection, he is not developing urinary retention which she had been in the past.  \par \par In January of this year he had been told of a urinary tract infection based on the urine test collected by his primary doctor.  He had also noted the urine to be slightly cloudy but otherwise has had no symptoms.  He was treated with amoxicillin and said that the urine color did turn more clear.  He is not currently having any symptoms of dysuria or hematuria.  He has no fevers or chills and denies nausea or vomiting.\par

## 2023-02-20 ENCOUNTER — NON-APPOINTMENT (OUTPATIENT)
Age: 73
End: 2023-02-20

## 2023-02-20 LAB — PSA SERPL-MCNC: 0.02 NG/ML

## 2023-02-21 NOTE — PHYSICAL EXAM
[General Appearance - Alert] : alert [General Appearance - In No Acute Distress] : in no acute distress [General Appearance - Well Nourished] : well nourished [Sclera] : the sclera and conjunctiva were normal [Extraocular Movements] : extraocular movements were intact [Neck Appearance] : the appearance of the neck was normal [Neck Cervical Mass (___cm)] : no neck mass was observed [] : no respiratory distress Depth Of Biopsy: dermis [Respiration, Rhythm And Depth] : normal respiratory rhythm and effort [Exaggerated Use Of Accessory Muscles For Inspiration] : no accessory muscle use [Auscultation Breath Sounds / Voice Sounds] : lungs were clear to auscultation bilaterally [Heart Rate And Rhythm] : heart rate was normal and rhythm regular [Heart Sounds] : normal S1 and S2 [Heart Sounds Gallop] : no gallops [Murmurs] : no murmurs [Edema] : there was no peripheral edema [Bowel Sounds] : normal bowel sounds [Abdomen Soft] : soft [Abdomen Tenderness] : non-tender [Cervical Lymph Nodes Enlarged Posterior Bilaterally] : posterior cervical [Cervical Lymph Nodes Enlarged Anterior Bilaterally] : anterior cervical [Supraclavicular Lymph Nodes Enlarged Bilaterally] : supraclavicular [Abnormal Walk] : normal gait [Skin Color & Pigmentation] : normal skin color and pigmentation [Skin Turgor] : normal skin turgor [No Focal Deficits] : no focal deficits [Oriented To Time, Place, And Person] : oriented to person, place, and time [Impaired Insight] : insight and judgment were intact [Affect] : the affect was normal [FreeTextEntry1] : multiple abdominal scars

## 2023-03-22 ENCOUNTER — APPOINTMENT (OUTPATIENT)
Dept: ORTHOPEDIC SURGERY | Facility: CLINIC | Age: 73
End: 2023-03-22
Payer: MEDICARE

## 2023-03-22 VITALS — WEIGHT: 214 LBS | HEIGHT: 70 IN | BODY MASS INDEX: 30.64 KG/M2

## 2023-03-22 DIAGNOSIS — M17.12 UNILATERAL PRIMARY OSTEOARTHRITIS, LEFT KNEE: ICD-10-CM

## 2023-03-22 PROCEDURE — 99215 OFFICE O/P EST HI 40 MIN: CPT

## 2023-03-22 NOTE — PHYSICAL EXAM
[Normal Sensation] : normal sensation [Normal Mood and Affect] : normal mood and affect [Orientated] : orientated [NL (0)] : extension 0 degrees [5___] : quadriceps 5[unfilled]/5 [Right] : right knee [AP] : anteroposterior [Lateral] : lateral [[place]] : skyline [AP Standing] : anteroposterior standing [There are no fractures, subluxations or dislocations. No significant abnormalities are seen] : There are no fractures, subluxations or dislocations. No significant abnormalities are seen [advanced tricompartmental OA with medial compartment narrowing and varus alignment] : advanced tricompartmental OA with medial compartment narrowing and varus alignment [Advanced patellofemoral OA] : Advanced patellofemoral OA [] : no extensor lag [Left] : left knee [Moderate tricompartmental OA medial narrowing] : Moderate tricompartmental OA medial narrowing [de-identified] : extension 0 degrees [TWNoteComboBox7] : flexion 120 degrees

## 2023-03-22 NOTE — HISTORY OF PRESENT ILLNESS
[4] : 4 [2] : 2 [Dull/Aching] : dull/aching [] : This patient has had an injection before: yes [3] : 3 [Euflexxa] : Euflexxa [de-identified] : 72M p/w sam knee pain L>R today. Has known advanced bilateral knee OA. Has tried visco and CSI which help temporarily. he has tried PT and NSAIDs previously. He is unsure if he is ready for TKA at this time.\par \par 1/18/23: f/u sam knees, still having pain and weakness in both his knees, considering TKA\par 2/1/23: Here for Euflexxa #2 L knee \par 2/8/23 euflexxa 3 L knee improving\par 3/22/23 f/u sam knees, some relief from gel series but still having moments of sevfre pain, especially with stairs, would like to discuss TKA\par \par  [FreeTextEntry1] : left knee [FreeTextEntry5] : Damir is a 72 year M. States the knee is still in pain.  [de-identified] : no [de-identified] : 02/01/23

## 2023-03-22 NOTE — ASSESSMENT
[FreeTextEntry1] : 72M p/w adv sam knee OA\par \par He would like to proceed with L TKA, r b a explained to patient, we will call to schedule\par \par We discussed my findings and the natural history of their condition. We talked about the details of the proposed surgery and the recovery. We discussed the material risks, possible benefits and alternatives to surgery. The risks include but are not limited to infection, bleeding and possible need for blood transfusion, fracture, bowel blockage, bladder retention or infection, need for reoperation, stiffness and/or limited range of motion, possible damage to nerves and blood vessels, failure of fixation of components, risk of deep vein thromboses and pulmonary embolism, wound healing problems, dislocation, and possible leg length discrepancy. Although incredibly rare, we also discussed the risks of a cardiac event, stroke and even death during, or following, the surgery. We discussed the type of implants the patient will be receiving and the type of fixation that will be used, as well as whether a robot or computer navigation aide will be used. The patient understands they will need medical clearance and will attend a preoperative joint education class. We also discussed the type of anesthesia they will receive, and the risks associated with hospital or rehab length of stay, obesity, diabetes and smoking.\par

## 2023-03-30 NOTE — DISCHARGE NOTE ADULT - MEDICATION SUMMARY - MEDICATIONS TO CHANGE
I will SWITCH the dose or number of times a day I take the medications listed below when I get home from the hospital:  None
4 (moderate pain)

## 2023-04-17 ENCOUNTER — FORM ENCOUNTER (OUTPATIENT)
Age: 73
End: 2023-04-17

## 2023-04-23 ENCOUNTER — FORM ENCOUNTER (OUTPATIENT)
Age: 73
End: 2023-04-23

## 2023-05-04 ENCOUNTER — NON-APPOINTMENT (OUTPATIENT)
Age: 73
End: 2023-05-04

## 2023-05-04 ENCOUNTER — APPOINTMENT (OUTPATIENT)
Dept: NEPHROLOGY | Facility: CLINIC | Age: 73
End: 2023-05-04
Payer: MEDICARE

## 2023-05-04 VITALS
WEIGHT: 214 LBS | DIASTOLIC BLOOD PRESSURE: 67 MMHG | SYSTOLIC BLOOD PRESSURE: 134 MMHG | HEIGHT: 70 IN | TEMPERATURE: 98 F | OXYGEN SATURATION: 97 % | HEART RATE: 70 BPM | RESPIRATION RATE: 17 BRPM | BODY MASS INDEX: 30.64 KG/M2

## 2023-05-04 LAB
ALBUMIN SERPL ELPH-MCNC: 4.3 G/DL
ALP BLD-CCNC: 113 U/L
ALT SERPL-CCNC: 13 U/L
ANION GAP SERPL CALC-SCNC: 12 MMOL/L
AST SERPL-CCNC: 20 U/L
BASOPHILS # BLD AUTO: 0.03 K/UL
BASOPHILS NFR BLD AUTO: 0.5 %
BILIRUB SERPL-MCNC: 0.3 MG/DL
BUN SERPL-MCNC: 30 MG/DL
CALCIUM SERPL-MCNC: 10.1 MG/DL
CHLORIDE SERPL-SCNC: 107 MMOL/L
CMV DNA SPEC QL NAA+PROBE: NOT DETECTED IU/ML
CMVPCR LOG: NOT DETECTED LOG10IU/ML
CO2 SERPL-SCNC: 23 MMOL/L
CREAT SERPL-MCNC: 2.45 MG/DL
CREAT SPEC-SCNC: 143 MG/DL
CREAT/PROT UR: 0.3 RATIO
EGFR: 27 ML/MIN/1.73M2
EOSINOPHIL # BLD AUTO: 0.05 K/UL
EOSINOPHIL NFR BLD AUTO: 0.9 %
GLUCOSE SERPL-MCNC: 170 MG/DL
HCT VFR BLD CALC: 40.2 %
HGB BLD-MCNC: 12.6 G/DL
IMM GRANULOCYTES NFR BLD AUTO: 0.5 %
LDH SERPL-CCNC: 157 U/L
LYMPHOCYTES # BLD AUTO: 2.39 K/UL
LYMPHOCYTES NFR BLD AUTO: 43.7 %
MAGNESIUM SERPL-MCNC: 1.8 MG/DL
MAN DIFF?: NORMAL
MCHC RBC-ENTMCNC: 31 PG
MCHC RBC-ENTMCNC: 31.3 GM/DL
MCV RBC AUTO: 99 FL
MONOCYTES # BLD AUTO: 0.48 K/UL
MONOCYTES NFR BLD AUTO: 8.8 %
NEUTROPHILS # BLD AUTO: 2.49 K/UL
NEUTROPHILS NFR BLD AUTO: 45.6 %
PHOSPHATE SERPL-MCNC: 3.2 MG/DL
PLATELET # BLD AUTO: 153 K/UL
POTASSIUM SERPL-SCNC: 4.8 MMOL/L
PROT SERPL-MCNC: 6.9 G/DL
PROT UR-MCNC: 38 MG/DL
RBC # BLD: 4.06 M/UL
RBC # FLD: 13.7 %
SODIUM SERPL-SCNC: 142 MMOL/L
TACROLIMUS SERPL-MCNC: 7.1 NG/ML
URATE SERPL-MCNC: 9 MG/DL
WBC # FLD AUTO: 5.47 K/UL

## 2023-05-04 PROCEDURE — 99214 OFFICE O/P EST MOD 30 MIN: CPT

## 2023-05-04 RX ORDER — CHOLESTYRAMINE 4 G/9G
4 POWDER, FOR SUSPENSION ORAL
Refills: 0 | Status: ACTIVE | COMMUNITY
Start: 2023-05-04

## 2023-05-04 NOTE — HISTORY OF PRESENT ILLNESS
[80: Normal activity with effort; some signs or symptoms of disease.] : 80: Normal activity with effort; some signs or symptoms of disease.  [FreeTextEntry1] : Tube exchanged October 29th.  Pt diagnosed with Covid, wife was diagnosed with Covid on Jan 28th 2021.  On 29th pt was tested in an urgent care, tested positive and went to [place].  Had no symptoms.  Received the monoclonal antibody on the 29th.  Creatinine in 3's in [facility] and he was admitted and received 5 days of IV antibiotics for urine infection.\par \par Pt admitted June 2nd 2021 for CHELA and hyperkalemia.  Found to have UTI and treated.  Had pseudomonas in urine and received zosyn.  CHELA improved before discharge.  Creatinine decreased to 2.7 before discharge.  Stent internalized\par \par Receiving RT therapy for prostate. Has had admissions with urinary retention after RT started.  \par Saw an oncologist who recently performed labs and mentioned that creatinine was high.  Pt still has armenta catheter and was advised to have a TURP.  Pt will be seeing Dr. Collins this Thursday.  Had a positive urine culture end of September.  \par \par friend Dr. Sean Salinas [phone].\par \par Pt was admitted to [facility] 11/5 for IV abx prior to planned TURP 11/8.  11/9/21 - s/p TURP and transplant kidney stent replacement.  Armenta removed. Pt voided very light pink about 250 cc. \par \par Pt has followed up with Dr. Phillip\[facility]  [de-identified] : Creatinine 2.4 today.  He still has some anal pain but not bad today.  Has knee pain and plans to have a TKR end of May.    Urinating ok, does not feel like he is retaining.  No dysuria.

## 2023-05-04 NOTE — ASSESSMENT
[FreeTextEntry1] : 1. CKD of kidney transplantation - Pts aaron creatinine 1.9 but had CHELA with pyelonephritis. Has had multiple episodes of CHELA.  Pt in process of relisting but was on hold due to prostate cancer and now GFR too high.  Cell free DNA 0.2% in October end.  Last creatinine 2.4.  Pt also has internal JJ stent being exchanged by Dr. Phillip.  Next exchange in July 2023. \par 2. Immunosuppression - simulect induction, tacrolimus target 5-7, MMF 250mgs BID.  \par 3. DM2 - on januvia and glimepiride. Januvia stopped due to high co-pays.   \par 4. HTN - controlled at home.  \par 5. HCV - genotype 3a. completed Epclusa. Last vl note detected. \par 6. Anemia - due to CKD/CHELA.  last Hb at goal. \par 7. Chronic diarrhea - on and off on lomotil prn. Has seen GI and had a colonoscopy in 2019. Has history of small bowel resection which may be the cause of diarrhea. 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 \par 9.  UTI - last culture consistent with contamination. \par 10.  COVID19 -  Resolved.  Received monoclonal antibody and recently vaccinated.  \par 11.  Prostate cancer - Completed androgen depletion and completed RT.  Last PSA was 0.02.\par 12.  Urine retention - now s/p TURP then removal of necrotic prostate debris.  He will f/u with Dr. Phillip.  \par 13.  Anal fissure s/p surgery -  Following with colorectal surgery and GI\par \par f/u in 3 months.

## 2023-05-05 LAB
APPEARANCE: CLEAR
BACTERIA: NEGATIVE /HPF
BILIRUBIN URINE: NEGATIVE
BKV DNA SPEC QL NAA+PROBE: NOT DETECTED IU/ML
BLOOD URINE: ABNORMAL
CAST: 2 /LPF
COLOR: YELLOW
EPITHELIAL CELLS: 1 /HPF
GLUCOSE QUALITATIVE U: NEGATIVE MG/DL
KETONES URINE: NEGATIVE MG/DL
LEUKOCYTE ESTERASE URINE: ABNORMAL
MICROSCOPIC-UA: NORMAL
NITRITE URINE: NEGATIVE
PH URINE: 5.5
PROTEIN URINE: 30 MG/DL
RED BLOOD CELLS URINE: 0 /HPF
SPECIFIC GRAVITY URINE: 1.01
UROBILINOGEN URINE: 0.2 MG/DL
WHITE BLOOD CELLS URINE: 10 /HPF

## 2023-05-10 ENCOUNTER — OUTPATIENT (OUTPATIENT)
Dept: OUTPATIENT SERVICES | Facility: HOSPITAL | Age: 73
LOS: 1 days | Discharge: HOME | End: 2023-05-10
Payer: MEDICARE

## 2023-05-10 VITALS
DIASTOLIC BLOOD PRESSURE: 68 MMHG | HEART RATE: 68 BPM | RESPIRATION RATE: 18 BRPM | TEMPERATURE: 99 F | WEIGHT: 218.26 LBS | HEIGHT: 70 IN | OXYGEN SATURATION: 98 % | SYSTOLIC BLOOD PRESSURE: 130 MMHG

## 2023-05-10 DIAGNOSIS — Z01.818 ENCOUNTER FOR OTHER PREPROCEDURAL EXAMINATION: ICD-10-CM

## 2023-05-10 DIAGNOSIS — Z98.890 OTHER SPECIFIED POSTPROCEDURAL STATES: Chronic | ICD-10-CM

## 2023-05-10 DIAGNOSIS — A63.0 ANOGENITAL (VENEREAL) WARTS: Chronic | ICD-10-CM

## 2023-05-10 DIAGNOSIS — M17.12 UNILATERAL PRIMARY OSTEOARTHRITIS, LEFT KNEE: ICD-10-CM

## 2023-05-10 DIAGNOSIS — Z90.5 ACQUIRED ABSENCE OF KIDNEY: Chronic | ICD-10-CM

## 2023-05-10 DIAGNOSIS — Z01.812 ENCOUNTER FOR PREPROCEDURAL LABORATORY EXAMINATION: ICD-10-CM

## 2023-05-10 DIAGNOSIS — Z90.79 ACQUIRED ABSENCE OF OTHER GENITAL ORGAN(S): Chronic | ICD-10-CM

## 2023-05-10 DIAGNOSIS — T86.19 OTHER COMPLICATION OF KIDNEY TRANSPLANT: ICD-10-CM

## 2023-05-10 DIAGNOSIS — I77.0 ARTERIOVENOUS FISTULA, ACQUIRED: Chronic | ICD-10-CM

## 2023-05-10 DIAGNOSIS — Z94.0 KIDNEY TRANSPLANT STATUS: Chronic | ICD-10-CM

## 2023-05-10 LAB
A1C WITH ESTIMATED AVERAGE GLUCOSE RESULT: 6.4 % — HIGH (ref 4–5.6)
ALBUMIN SERPL ELPH-MCNC: 3.6 G/DL — SIGNIFICANT CHANGE UP (ref 3.3–5)
ALP SERPL-CCNC: 90 U/L — SIGNIFICANT CHANGE UP (ref 40–120)
ALT FLD-CCNC: 26 U/L — SIGNIFICANT CHANGE UP (ref 12–78)
ANION GAP SERPL CALC-SCNC: 5 MMOL/L — SIGNIFICANT CHANGE UP (ref 5–17)
APTT BLD: 32 SEC — SIGNIFICANT CHANGE UP (ref 27.5–35.5)
AST SERPL-CCNC: 17 U/L — SIGNIFICANT CHANGE UP (ref 15–37)
BASOPHILS # BLD AUTO: 0.02 K/UL — SIGNIFICANT CHANGE UP (ref 0–0.2)
BASOPHILS NFR BLD AUTO: 0.4 % — SIGNIFICANT CHANGE UP (ref 0–2)
BILIRUB SERPL-MCNC: 0.5 MG/DL — SIGNIFICANT CHANGE UP (ref 0.2–1.2)
BUN SERPL-MCNC: 34 MG/DL — HIGH (ref 7–23)
CALCIUM SERPL-MCNC: 10 MG/DL — SIGNIFICANT CHANGE UP (ref 8.5–10.1)
CHLORIDE SERPL-SCNC: 109 MMOL/L — HIGH (ref 96–108)
CO2 SERPL-SCNC: 23 MMOL/L — SIGNIFICANT CHANGE UP (ref 22–31)
CREAT SERPL-MCNC: 2.24 MG/DL — HIGH (ref 0.5–1.3)
EGFR: 30 ML/MIN/1.73M2 — LOW
EOSINOPHIL # BLD AUTO: 0.08 K/UL — SIGNIFICANT CHANGE UP (ref 0–0.5)
EOSINOPHIL NFR BLD AUTO: 1.6 % — SIGNIFICANT CHANGE UP (ref 0–6)
ESTIMATED AVERAGE GLUCOSE: 137 MG/DL — HIGH (ref 68–114)
GLUCOSE SERPL-MCNC: 142 MG/DL — HIGH (ref 70–99)
HCT VFR BLD CALC: 37 % — LOW (ref 39–50)
HGB BLD-MCNC: 12 G/DL — LOW (ref 13–17)
IMM GRANULOCYTES NFR BLD AUTO: 0.8 % — SIGNIFICANT CHANGE UP (ref 0–0.9)
INR BLD: 0.95 RATIO — SIGNIFICANT CHANGE UP (ref 0.88–1.16)
LYMPHOCYTES # BLD AUTO: 2.01 K/UL — SIGNIFICANT CHANGE UP (ref 1–3.3)
LYMPHOCYTES # BLD AUTO: 41 % — SIGNIFICANT CHANGE UP (ref 13–44)
MCHC RBC-ENTMCNC: 30.7 PG — SIGNIFICANT CHANGE UP (ref 27–34)
MCHC RBC-ENTMCNC: 32.4 G/DL — SIGNIFICANT CHANGE UP (ref 32–36)
MCV RBC AUTO: 94.6 FL — SIGNIFICANT CHANGE UP (ref 80–100)
MONOCYTES # BLD AUTO: 0.45 K/UL — SIGNIFICANT CHANGE UP (ref 0–0.9)
MONOCYTES NFR BLD AUTO: 9.2 % — SIGNIFICANT CHANGE UP (ref 2–14)
MRSA PCR RESULT.: SIGNIFICANT CHANGE UP
NEUTROPHILS # BLD AUTO: 2.3 K/UL — SIGNIFICANT CHANGE UP (ref 1.8–7.4)
NEUTROPHILS NFR BLD AUTO: 47 % — SIGNIFICANT CHANGE UP (ref 43–77)
NRBC # BLD: 0 /100 WBCS — SIGNIFICANT CHANGE UP (ref 0–0)
PLATELET # BLD AUTO: 148 K/UL — LOW (ref 150–400)
POTASSIUM SERPL-MCNC: 4.5 MMOL/L — SIGNIFICANT CHANGE UP (ref 3.5–5.3)
POTASSIUM SERPL-SCNC: 4.5 MMOL/L — SIGNIFICANT CHANGE UP (ref 3.5–5.3)
PROT SERPL-MCNC: 7.3 GM/DL — SIGNIFICANT CHANGE UP (ref 6–8.3)
PROTHROM AB SERPL-ACNC: 11.3 SEC — SIGNIFICANT CHANGE UP (ref 10.5–13.4)
RBC # BLD: 3.91 M/UL — LOW (ref 4.2–5.8)
RBC # FLD: 13.9 % — SIGNIFICANT CHANGE UP (ref 10.3–14.5)
S AUREUS DNA NOSE QL NAA+PROBE: SIGNIFICANT CHANGE UP
SODIUM SERPL-SCNC: 137 MMOL/L — SIGNIFICANT CHANGE UP (ref 135–145)
WBC # BLD: 4.9 K/UL — SIGNIFICANT CHANGE UP (ref 3.8–10.5)
WBC # FLD AUTO: 4.9 K/UL — SIGNIFICANT CHANGE UP (ref 3.8–10.5)

## 2023-05-10 PROCEDURE — 93010 ELECTROCARDIOGRAM REPORT: CPT

## 2023-05-10 RX ORDER — CEFPODOXIME PROXETIL 100 MG
1 TABLET ORAL
Qty: 0 | Refills: 0 | DISCHARGE

## 2023-05-10 RX ORDER — CHOLESTYRAMINE 4 G/9G
0 POWDER, FOR SUSPENSION ORAL
Qty: 0 | Refills: 0 | DISCHARGE

## 2023-05-10 RX ORDER — SODIUM BICARBONATE 1 MEQ/ML
1 SYRINGE (ML) INTRAVENOUS
Qty: 0 | Refills: 0 | DISCHARGE

## 2023-05-10 RX ORDER — SITAGLIPTIN 50 MG/1
1 TABLET, FILM COATED ORAL
Qty: 0 | Refills: 0 | DISCHARGE

## 2023-05-10 RX ORDER — NIACIN 50 MG
1 TABLET ORAL
Qty: 0 | Refills: 0 | DISCHARGE

## 2023-05-10 RX ORDER — ATENOLOL 25 MG/1
1 TABLET ORAL
Qty: 0 | Refills: 0 | DISCHARGE

## 2023-05-10 RX ORDER — VALGANCICLOVIR 450 MG/1
1 TABLET, FILM COATED ORAL
Qty: 0 | Refills: 0 | DISCHARGE

## 2023-05-10 RX ORDER — MYCOPHENOLATE MOFETIL 250 MG/1
2 CAPSULE ORAL
Qty: 0 | Refills: 0 | DISCHARGE

## 2023-05-10 RX ORDER — DIPHENOXYLATE HCL/ATROPINE 2.5-.025MG
1 TABLET ORAL
Qty: 0 | Refills: 0 | DISCHARGE

## 2023-05-10 RX ORDER — FAMOTIDINE 10 MG/ML
1 INJECTION INTRAVENOUS
Qty: 0 | Refills: 0 | DISCHARGE

## 2023-05-10 NOTE — PHYSICAL THERAPY INITIAL EVALUATION ADULT - PERTINENT HX OF CURRENT PROBLEM, REHAB EVAL
Patient attends pre-op testing today following consult c Dr. Campuzano due to chronic pain to left knee. Elective L TKA is now scheduled in this facility for 5/30/2023.

## 2023-05-10 NOTE — H&P PST ADULT - PROBLEM SELECTOR PLAN 1
left total knee replacement.  Labs - CBC, BMP, PT/PTT,T&S, nose culture and EKG done   Medical  and Nephrology eval advised  Preop 3 day antibacterial wash  instruction reviewed and given, MRSA and MSSA screening done today.  Avoid NSAID and OTC supplements for 7 days  Continue all prescribed meds as instructed  NPO after 11pm the day before surgery. Take medicines as advised the morning of surgery with sips of water. Ensure given and explained protocol  Vaccinated for covid.   verbalized understanding of all instructions.

## 2023-05-10 NOTE — PHYSICAL THERAPY INITIAL EVALUATION ADULT - RANGE OF MOTION EXAMINATION, REHAB EVAL
except left knee flexion 0-110 degrees due to pain./bilateral upper extremity ROM was WFL (within functional limits)/bilateral lower extremity ROM was WFL (within functional limits)/deficits as listed below

## 2023-05-10 NOTE — PHYSICAL THERAPY INITIAL EVALUATION ADULT - ADDITIONAL COMMENTS
Pt lives with his wife (whom can provide assist upon D/C home) in a private home, 4 entry steps c B/L rails(far apart), 1 flight of stairs inside c R rail up. Pt has a tub/shower combo with a fixed shower head, standard toilet seat height, & + grab bar. Pt states he is currently independent with all functional mobility including community ambulation with straight cane prn. Pt states he is independent with ADL's as well. Pt reports daily 4/10 pain at rest & states it is worse with any activity 7/10. Pt is right hand dominant, wears eye glasses, drives, & is currently working part time.  Pt denies taking narcotics for pain management. Goal of therapy: manage pain & improve functional mobility.

## 2023-05-10 NOTE — H&P PST ADULT - ASSESSMENT
Osteoarthritis left knee Osteoarthritis left knee  CAPRINI SCORE [CLOT]    AGE RELATED RISK FACTORS                                                       MOBILITY RELATED FACTORS  [ ] Age 41-60 years                                            (1 Point)                  [ ] Bed rest                                                        (1 Point)  [x ] Age: 61-74 years                                           (2 Points)                 [ ] Plaster cast                                                   (2 Points)  [ ] Age= 75 years                                              (3 Points)                 [ ] Bed bound for more than 72 hours                 (2 Points)    DISEASE RELATED RISK FACTORS                                               GENDER SPECIFIC FACTORS  [ ] Edema in the lower extremities                       (1 Point)                  [ ] Pregnancy                                                     (1 Point)  [ ] Varicose veins                                               (1 Point)                  [ ] Post-partum < 6 weeks                                   (1 Point)             [ ] BMI > 25 Kg/m2                                            (1 Point)                  [ ] Hormonal therapy  or oral contraception          (x1 Point)                 [ ] Sepsis (in the previous month)                        (1 Point)                  [ ] History of pregnancy complications                 (1 point)  [ ] Pneumonia or serious lung disease                                               [ ] Unexplained or recurrent                     (1 Point)           (in the previous month)                               (1 Point)  [ ] Abnormal pulmonary function test                     (1 Point)                 SURGERY RELATED RISK FACTORS  [ ] Acute myocardial infarction                              (1 Point)                 [ ]  Section                                             (1 Point)  [ ] Congestive heart failure (in the previous month)  (1 Point)               [ ] Minor surgery                                                  (1 Point)   [ ] Inflammatory bowel disease                             (1 Point)                 [ ] Arthroscopic surgery                                        (2 Points)  [ ] Central venous access                                      (2 Points)                [ ] General surgery lasting more than 45 minutes   (2 Points)       [ ] Stroke (in the previous month)                          (5 Points)               [5 ] Elective arthroplasty                                         (5 Points)                                                                                                                                               HEMATOLOGY RELATED FACTORS                                                 TRAUMA RELATED RISK FACTORS  [ ] Prior episodes of VTE                                     (3 Points)                [ ] Fracture of the hip, pelvis, or leg                       (5 Points)  [ ] Positive family history for VTE                         (3 Points)                 [ ] Acute spinal cord injury (in the previous month)  (5 Points)  [ ] Prothrombin 99234 A                                     (3 Points)                 [ ] Paralysis  (less than 1 month)                             (5 Points)  [ ] Factor V Leiden                                             (3 Points)                  [ ] Multiple Trauma within 1 month                        (5 Points)  [ ] Lupus anticoagulants                                     (3 Points)                                                           [ ] Anticardiolipin antibodies                               (3 Points)                                                       [ ] High homocysteine in the blood                      (3 Points)                                             [ ] Other congenital or acquired thrombophilia      (3 Points)                                                [ ] Heparin induced thrombocytopenia                  (3 Points)                                          Total Score [     8     ]    Caprini Score 0 - 2:  Low Risk, No VTE Prophylaxis required for most patients, encourage ambulation  Caprini Score 3 - 6:  At Risk, pharmacologic VTE prophylaxis is indicated for most patients (in the absence of a contraindication)  Caprini Score Greater than or = 7:  High Risk, pharmacologic VTE prophylaxis is indicated for most patients (in the absence of a contraindication)

## 2023-05-10 NOTE — H&P PST ADULT - PROBLEM SELECTOR PLAN 2
Medication instructions given ,  on Chronic prednisone use, to continue as usual.   To see Nephrologist prior to surgery.

## 2023-05-10 NOTE — H&P PST ADULT - NSICDXPASTSURGICALHX_GEN_ALL_CORE_FT
PAST SURGICAL HISTORY:  Anal condyloma S/P excision; 21    AV fistula 2013/ left forearm    H/O colonoscopy     H/O ileostomy 2017   for 3 months. s/p Reversal    Kidney transplant recipient 2018  @ [facility] :  +  Hep C Donor    S/P anal fissurectomy and chemical denervation  with biopsy and fulguration of anal lesions, 2021    S/p nephrectomy left 9/15/2015   + Cancer    S/p nephrectomy right 12/10/2015   benign    S/P right knee arthroscopy     S/P TURP (transurethral resection of prostate) and Transplant Kidney Stent Replacemernt, 20

## 2023-05-11 LAB — VIT D25+D1,25 OH+D1,25 PNL SERPL-MCNC: 21.2 PG/ML — SIGNIFICANT CHANGE UP (ref 19.9–79.3)

## 2023-05-17 ENCOUNTER — TRANSCRIPTION ENCOUNTER (OUTPATIENT)
Age: 73
End: 2023-05-17

## 2023-05-17 RX ORDER — ALLOPURINOL 100 MG/1
100 TABLET ORAL DAILY
Qty: 90 | Refills: 3 | Status: ACTIVE | COMMUNITY
Start: 2018-09-11 | End: 1900-01-01

## 2023-05-29 ENCOUNTER — TRANSCRIPTION ENCOUNTER (OUTPATIENT)
Age: 73
End: 2023-05-29

## 2023-05-30 ENCOUNTER — APPOINTMENT (OUTPATIENT)
Dept: ORTHOPEDIC SURGERY | Facility: HOSPITAL | Age: 73
End: 2023-05-30
Payer: MEDICARE

## 2023-05-30 ENCOUNTER — OUTPATIENT (OUTPATIENT)
Dept: OUTPATIENT SERVICES | Facility: HOSPITAL | Age: 73
LOS: 1 days | Discharge: HOME | End: 2023-05-30

## 2023-05-30 ENCOUNTER — TRANSCRIPTION ENCOUNTER (OUTPATIENT)
Age: 73
End: 2023-05-30

## 2023-05-30 DIAGNOSIS — Z98.890 OTHER SPECIFIED POSTPROCEDURAL STATES: Chronic | ICD-10-CM

## 2023-05-30 DIAGNOSIS — Z90.79 ACQUIRED ABSENCE OF OTHER GENITAL ORGAN(S): Chronic | ICD-10-CM

## 2023-05-30 DIAGNOSIS — A63.0 ANOGENITAL (VENEREAL) WARTS: Chronic | ICD-10-CM

## 2023-05-30 DIAGNOSIS — I77.0 ARTERIOVENOUS FISTULA, ACQUIRED: Chronic | ICD-10-CM

## 2023-05-30 DIAGNOSIS — Z90.5 ACQUIRED ABSENCE OF KIDNEY: Chronic | ICD-10-CM

## 2023-05-30 DIAGNOSIS — Z94.0 KIDNEY TRANSPLANT STATUS: Chronic | ICD-10-CM

## 2023-05-30 PROCEDURE — 27447 TOTAL KNEE ARTHROPLASTY: CPT | Mod: AS,LT

## 2023-05-30 PROCEDURE — 27447 TOTAL KNEE ARTHROPLASTY: CPT | Mod: LT

## 2023-05-31 ENCOUNTER — TRANSCRIPTION ENCOUNTER (OUTPATIENT)
Age: 73
End: 2023-05-31

## 2023-06-06 ENCOUNTER — TRANSCRIPTION ENCOUNTER (OUTPATIENT)
Age: 73
End: 2023-06-06

## 2023-06-07 DIAGNOSIS — M17.12 UNILATERAL PRIMARY OSTEOARTHRITIS, LEFT KNEE: ICD-10-CM

## 2023-06-07 DIAGNOSIS — Z92.21 PERSONAL HISTORY OF ANTINEOPLASTIC CHEMOTHERAPY: ICD-10-CM

## 2023-06-07 DIAGNOSIS — Z94.0 KIDNEY TRANSPLANT STATUS: ICD-10-CM

## 2023-06-07 DIAGNOSIS — Z92.3 PERSONAL HISTORY OF IRRADIATION: ICD-10-CM

## 2023-06-07 DIAGNOSIS — D63.1 ANEMIA IN CHRONIC KIDNEY DISEASE: ICD-10-CM

## 2023-06-07 DIAGNOSIS — Z85.46 PERSONAL HISTORY OF MALIGNANT NEOPLASM OF PROSTATE: ICD-10-CM

## 2023-06-07 DIAGNOSIS — Z85.520 PERSONAL HISTORY OF MALIGNANT CARCINOID TUMOR OF KIDNEY: ICD-10-CM

## 2023-06-07 DIAGNOSIS — E66.9 OBESITY, UNSPECIFIED: ICD-10-CM

## 2023-06-07 DIAGNOSIS — N18.6 END STAGE RENAL DISEASE: ICD-10-CM

## 2023-06-07 DIAGNOSIS — T86.19 OTHER COMPLICATION OF KIDNEY TRANSPLANT: ICD-10-CM

## 2023-06-07 DIAGNOSIS — I12.0 HYPERTENSIVE CHRONIC KIDNEY DISEASE WITH STAGE 5 CHRONIC KIDNEY DISEASE OR END STAGE RENAL DISEASE: ICD-10-CM

## 2023-06-07 DIAGNOSIS — Z79.84 LONG TERM (CURRENT) USE OF ORAL HYPOGLYCEMIC DRUGS: ICD-10-CM

## 2023-06-07 DIAGNOSIS — E11.22 TYPE 2 DIABETES MELLITUS WITH DIABETIC CHRONIC KIDNEY DISEASE: ICD-10-CM

## 2023-06-07 DIAGNOSIS — Z86.19 PERSONAL HISTORY OF OTHER INFECTIOUS AND PARASITIC DISEASES: ICD-10-CM

## 2023-06-07 DIAGNOSIS — Z99.2 DEPENDENCE ON RENAL DIALYSIS: ICD-10-CM

## 2023-06-07 DIAGNOSIS — Z86.16 PERSONAL HISTORY OF COVID-19: ICD-10-CM

## 2023-06-20 ENCOUNTER — TRANSCRIPTION ENCOUNTER (OUTPATIENT)
Age: 73
End: 2023-06-20

## 2023-06-21 ENCOUNTER — APPOINTMENT (OUTPATIENT)
Dept: ORTHOPEDIC SURGERY | Facility: CLINIC | Age: 73
End: 2023-06-21
Payer: MEDICARE

## 2023-06-21 VITALS — HEIGHT: 70 IN | WEIGHT: 214 LBS | BODY MASS INDEX: 30.64 KG/M2

## 2023-06-21 PROCEDURE — 73562 X-RAY EXAM OF KNEE 3: CPT | Mod: LT

## 2023-06-21 PROCEDURE — 99024 POSTOP FOLLOW-UP VISIT: CPT

## 2023-06-21 NOTE — HISTORY OF PRESENT ILLNESS
[4] : 4 [3] : 3 [] : yes [de-identified] : 72M p/w sam knee pain L>R today. Has known advanced bilateral knee OA. Has tried visco and CSI which help temporarily. he has tried PT and NSAIDs previously. He is unsure if he is ready for TKA at this time.\par 1/18/23: f/u sam knees, still having pain and weakness in both his knees, considering TKA\par 2/1/23: Here for Euflexxa #2 L knee \par 2/8/23 euflexxa 3 L knee improving\par 3/22/23 f/u sam knees, some relief from gel series but still having moments of sevfre pain, especially with stairs, would like to discuss TKA\par \par 6/21/23: 3 weeks s/p L TKA. Notes he is gradually improving. Taking oxy prn.  [FreeTextEntry1] : left knee  [FreeTextEntry5] : Patient is here for 1st post op visit from sx on 5/30/23 - LT TKA. No issues or concerns since sx. Currently doing at home PT. Will notice discomfort with prolonged rest. Takes oxycodone as needed.  [de-identified] : 5/30/23 [de-identified] : LT TKA

## 2023-06-21 NOTE — PHYSICAL EXAM
[de-identified] : LEFT KNEE\par Incision is clean and dry with no drainage - dressing removed -\par Sensation intact\par Motor 5/5 \par +1 edema LE\par ROM 3-95\par \par Xray 3 views Left knee - Implants good position and well fixed  [Right] : right knee [There are no fractures, subluxations or dislocations. No significant abnormalities are seen] : There are no fractures, subluxations or dislocations. No significant abnormalities are seen [No loss of surgical correlation. Bony alignment acceptable. Hardware in appropriate position] : No loss of surgical correlation. Bony alignment acceptable. Hardware in appropriate position

## 2023-06-21 NOTE — ASSESSMENT
[FreeTextEntry1] : 72M 3 weeks s/p L TKA; adv R knee OA\par \par He is doing well today and happy with progress. Begin outpatient PT. All questions and concerns were addressed.

## 2023-06-21 NOTE — DISCUSSION/SUMMARY
[de-identified] : The incision was inspected and was clean and dry with no drainage.  The patient was instructed to call for fevers, chills, wound drainage, wound opening, redness, or any other concerns.\par \par The patient is doing well at this time. The patient will be started on a course of physical therapy. I recommended that the patient works on range of motion at home and was shown how to do this. I encouraged the patient to increase ambulation. The patient can continue to take Tylenol for occasional discomfort. The patient was advised not to do any dental work for the first three months following the surgery. We will see the patient  back for a follow-up for a repeat evaluation. The patient will call or return earlier for any questions or concerns.  Signs and symptoms of infection reviewed and patient advised to call immediately for redness, fevers, and/or chills.\par \par Progress note completed by Tara Pretty PA-C

## 2023-06-27 ENCOUNTER — TRANSCRIPTION ENCOUNTER (OUTPATIENT)
Age: 73
End: 2023-06-27

## 2023-07-10 RX ORDER — METOPROLOL SUCCINATE 100 MG/1
100 TABLET, EXTENDED RELEASE ORAL
Qty: 90 | Refills: 3 | Status: ACTIVE | COMMUNITY
Start: 2019-09-12 | End: 1900-01-01

## 2023-07-19 ENCOUNTER — APPOINTMENT (OUTPATIENT)
Dept: ORTHOPEDIC SURGERY | Facility: CLINIC | Age: 73
End: 2023-07-19
Payer: MEDICARE

## 2023-07-19 VITALS — HEIGHT: 70 IN | BODY MASS INDEX: 30.64 KG/M2 | WEIGHT: 214 LBS

## 2023-07-19 PROCEDURE — 99214 OFFICE O/P EST MOD 30 MIN: CPT | Mod: 25

## 2023-07-19 PROCEDURE — 20610 DRAIN/INJ JOINT/BURSA W/O US: CPT | Mod: 79,RT

## 2023-07-19 PROCEDURE — 99213 OFFICE O/P EST LOW 20 MIN: CPT | Mod: 24,25

## 2023-07-19 NOTE — ASSESSMENT
[FreeTextEntry1] : 72M 3 weeks s/p L TKA; adv R knee OA\par \par He is doing well today and happy with progress. Begin outpatient PT. will transition from oxy to tramadol, All questions and concerns were addressed. R knee euflexxa 1 tolerated well, return 1 week to continue

## 2023-07-19 NOTE — PHYSICAL EXAM
[Right] : right knee [There are no fractures, subluxations or dislocations. No significant abnormalities are seen] : There are no fractures, subluxations or dislocations. No significant abnormalities are seen [No loss of surgical correlation. Bony alignment acceptable. Hardware in appropriate position] : No loss of surgical correlation. Bony alignment acceptable. Hardware in appropriate position [de-identified] : LEFT KNEE\par Incision is clean and dry with no drainage - dressing removed -\par Sensation intact\par Motor 5/5 \par +1 edema LE\par ROM 3-120\par \par Xray 3 views Left knee - Implants good position and well fixed \par \par RLE:\par MJT+\par rom 7-125\par motor intach ehl fhl ta g\par silt sp dp s s pt\par toes wwp bcr

## 2023-07-19 NOTE — HISTORY OF PRESENT ILLNESS
[4] : 4 [3] : 3 [] : Post Surgical Visit: yes [de-identified] : 72M p/w sam knee pain L>R today. Has known advanced bilateral knee OA. Has tried visco and CSI which help temporarily. he has tried PT and NSAIDs previously. He is unsure if he is ready for TKA at this time.\par 1/18/23: f/u sam knees, still having pain and weakness in both his knees, considering TKA\par 2/1/23: Here for Euflexxa #2 L knee \par 2/8/23 euflexxa 3 L knee improving\par 3/22/23 f/u sam knees, some relief from gel series but still having moments of sevfre pain, especially with stairs, would like to discuss TKA\par \par 6/21/23: 3 weeks s/p L TKA. Notes he is gradually improving. Taking oxy prn. \par 7/19/23 7 weeks s/p L TKA, improving with PT, pain is steadily improving, denies n/v/f/c [FreeTextEntry1] : left knee  [FreeTextEntry5] : Patient is here for 2nd post op visit from sx on 5/30/23 - LT TKA. No issues or concerns since sx. Currently doing at home PT. Will notice discomfort with prolonged rest. Takes oxycodone as needed.  [de-identified] : 5/30/23 [de-identified] : LT TKA

## 2023-07-19 NOTE — DISCUSSION/SUMMARY
[de-identified] : The incision was inspected and was clean and dry with no drainage.  The patient was instructed to call for fevers, chills, wound drainage, wound opening, redness, or any other concerns.\par \par The patient is doing well at this time. The patient will be started on a course of physical therapy. I recommended that the patient works on range of motion at home and was shown how to do this. I encouraged the patient to increase ambulation. The patient can continue to take Tylenol for occasional discomfort. The patient was advised not to do any dental work for the first three months following the surgery. We will see the patient  back for a follow-up for a repeat evaluation. The patient will call or return earlier for any questions or concerns.  Signs and symptoms of infection reviewed and patient advised to call immediately for redness, fevers, and/or chills.\par \par The risks, benefits, contents and alternatives to injection were explained in full to the patient. Risks outlined include but are not limited to infection, sepsis, bleeding, scarring, skin discoloration, temporary increase in pain, syncopal episode, failure to resolve symptoms, allergic reaction, flare reaction, permanent white skin discoloration, symptom recurrence, and elevation of blood sugar in diabetics. Patient understood the risks. All questions were answered. After discussion of options, patient requested an injection. Oral informed consent was obtained and sterile prep was done of the injection site. Sterile technique was used to introduce the mixture. Patient tolerated the procedure well. Patient advised to ice the injection site this evening. Signs and symptoms of infection reviewed and patient advised to call immediately for redness, fevers, and/or chills.\par

## 2023-07-19 NOTE — PROCEDURE
[Large Joint Injection] : Large joint injection [Right] : of the right [Knee] : knee [Pain] : pain [Inflammation] : inflammation [X-ray evidence of Osteoarthritis on this or prior visit] : x-ray evidence of Osteoarthritis on this or prior visit [Alcohol] : alcohol [Betadine] : betadine [Ethyl Chloride sprayed topically] : ethyl chloride sprayed topically [Sterile technique used] : sterile technique used [Euflexxa(20mg)] : 20mg of Euflexxa [#1] : series #1 [Call if redness, pain or fever occur] : call if redness, pain or fever occur [Apply ice for 15min out of every hour for the next 12-24 hours as tolerated] : apply ice for 15 minutes out of every hour for the next 12-24 hours as tolerated [Previous OTC use and PT nontherapeutic] : patient has tried OTC's including aspirin, Ibuprofen, Aleve, etc or prescription NSAIDS, and/or exercises at home and/or physical therapy without satisfactory response [Patient had decreased mobility in the joint] : patient had decreased mobility in the joint [Risks, benefits, alternatives discussed / Verbal consent obtained] : the risks benefits, and alternatives have been discussed, and verbal consent was obtained

## 2023-07-20 ENCOUNTER — APPOINTMENT (OUTPATIENT)
Dept: UROLOGY | Facility: CLINIC | Age: 73
End: 2023-07-20
Payer: MEDICARE

## 2023-07-20 VITALS
WEIGHT: 206 LBS | RESPIRATION RATE: 17 BRPM | SYSTOLIC BLOOD PRESSURE: 144 MMHG | BODY MASS INDEX: 29.49 KG/M2 | OXYGEN SATURATION: 98 % | HEART RATE: 86 BPM | HEIGHT: 70 IN | DIASTOLIC BLOOD PRESSURE: 70 MMHG

## 2023-07-20 PROCEDURE — 99214 OFFICE O/P EST MOD 30 MIN: CPT

## 2023-07-21 LAB — PSA SERPL-MCNC: 0.04 NG/ML

## 2023-07-21 NOTE — ASSESSMENT
[FreeTextEntry1] : Repeat of the PSA level today showed it to be 0.04 ng/mL.  This continued near undetectable level is reassuring and would suggest that he is not in need for any other treatment for his prostate cancer.  The PSA will continue to be monitored on a regular basis and I would suggest that he continue to have 6-month intervals in between the PSA testing.  His next will be early next year.\par \par We did review the patient's urinary incontinence.  We reviewed how incontinence after a combination of radiation therapy and prostate resection can result in urinary incontinence.  I discussed with him options that could be considered to alleviate the urinary incontinence including an artificial urinary sphincter or urethral sling.  I offered him a consultation with my colleague, Dr. Johnson, to consider these options but he says that he would much prefer to continue to live with the leakage in his satisfied with this option at this time point.  He would rather not have any other surgery.\par \par He is also due for exchange of his ureteral stent in the transplant ureter.  I will make arrangements for that to be done in the near future.\par \par

## 2023-07-21 NOTE — HISTORY OF PRESENT ILLNESS
[FreeTextEntry1] : Damir Medrano returns to the office today.  He is a 72-year-old male with history of prostate cancer treated with radiation therapy. He finished treatment one year ago.  He had developed urinary retention after starting radiation and I ultimately performed a transurethral resection of the prostate which has been successful in eliminating his need for prior catheters.  He has also been free of urinary tract infections which was a problem in the past before the transurethral resection of the prostate.\par \par He is experiencing urinary incontinence, he is able to make it to the bathroom at time but he is experiencing constant leakage. He changes 3-4 diaper a day, all of them are wet. Overnight he changes his diaper 2 times. Denies hematuria or dysuria. \par \par PSA was last checked in February and was 0.02ng/mL. \par \par The patient also has an existing stent in the transplant ureter.  He had developed obstruction of the transplant ureter requiring treatment with the stent.  The stent was last changed in August 2022.

## 2023-07-26 ENCOUNTER — APPOINTMENT (OUTPATIENT)
Dept: ORTHOPEDIC SURGERY | Facility: CLINIC | Age: 73
End: 2023-07-26
Payer: MEDICARE

## 2023-07-26 VITALS — HEIGHT: 70 IN | BODY MASS INDEX: 29.49 KG/M2 | WEIGHT: 206 LBS

## 2023-07-26 PROCEDURE — 99214 OFFICE O/P EST MOD 30 MIN: CPT | Mod: 25

## 2023-07-26 PROCEDURE — 20610 DRAIN/INJ JOINT/BURSA W/O US: CPT | Mod: 79,RT

## 2023-07-26 NOTE — PROCEDURE
[Large Joint Injection] : Large joint injection [Right] : of the right [Knee] : knee [Pain] : pain [Inflammation] : inflammation [X-ray evidence of Osteoarthritis on this or prior visit] : x-ray evidence of Osteoarthritis on this or prior visit [Alcohol] : alcohol [Betadine] : betadine [Ethyl Chloride sprayed topically] : ethyl chloride sprayed topically [Sterile technique used] : sterile technique used [Euflexxa(20mg)] : 20mg of Euflexxa [Call if redness, pain or fever occur] : call if redness, pain or fever occur [Apply ice for 15min out of every hour for the next 12-24 hours as tolerated] : apply ice for 15 minutes out of every hour for the next 12-24 hours as tolerated [Previous OTC use and PT nontherapeutic] : patient has tried OTC's including aspirin, Ibuprofen, Aleve, etc or prescription NSAIDS, and/or exercises at home and/or physical therapy without satisfactory response [Patient had decreased mobility in the joint] : patient had decreased mobility in the joint [Risks, benefits, alternatives discussed / Verbal consent obtained] : the risks benefits, and alternatives have been discussed, and verbal consent was obtained [#2] : series #2 [] : Patient tolerated procedure well [Patient was advised to rest the joint(s) for ____ days] : patient was advised to rest the joint(s) for [unfilled] days

## 2023-07-26 NOTE — PHYSICAL EXAM
[Right] : right knee [There are no fractures, subluxations or dislocations. No significant abnormalities are seen] : There are no fractures, subluxations or dislocations. No significant abnormalities are seen [No loss of surgical correlation. Bony alignment acceptable. Hardware in appropriate position] : No loss of surgical correlation. Bony alignment acceptable. Hardware in appropriate position [de-identified] : LEFT KNEE\par Incision is clean and dry with no drainage - dressing removed -\par Sensation intact\par Motor 5/5 \par +1 edema LE\par ROM 3-120\par \par Xray 3 views Left knee - Implants good position and well fixed \par \par RLE:\par MJT+\par rom 7-125\par motor intach ehl fhl ta g\par silt sp dp s s pt\par toes wwp bcr

## 2023-07-26 NOTE — DISCUSSION/SUMMARY
[de-identified] : The incision was inspected and was clean and dry with no drainage.  The patient was instructed to call for fevers, chills, wound drainage, wound opening, redness, or any other concerns.\par \par The patient is doing well at this time. The patient will be started on a course of physical therapy. I recommended that the patient works on range of motion at home and was shown how to do this. I encouraged the patient to increase ambulation. The patient can continue to take Tylenol for occasional discomfort. The patient was advised not to do any dental work for the first three months following the surgery. We will see the patient  back for a follow-up for a repeat evaluation. The patient will call or return earlier for any questions or concerns.  Signs and symptoms of infection reviewed and patient advised to call immediately for redness, fevers, and/or chills.\par \par The risks, benefits, contents and alternatives to injection were explained in full to the patient. Risks outlined include but are not limited to infection, sepsis, bleeding, scarring, skin discoloration, temporary increase in pain, syncopal episode, failure to resolve symptoms, allergic reaction, flare reaction, permanent white skin discoloration, symptom recurrence, and elevation of blood sugar in diabetics. Patient understood the risks. All questions were answered. After discussion of options, patient requested an injection. Oral informed consent was obtained and sterile prep was done of the injection site. Sterile technique was used to introduce the mixture. Patient tolerated the procedure well. Patient advised to ice the injection site this evening. Signs and symptoms of infection reviewed and patient advised to call immediately for redness, fevers, and/or chills.\par  \par Progress note completed by Tara Pretty PA-C

## 2023-07-26 NOTE — ASSESSMENT
[FreeTextEntry1] : 72M 8 weeks s/p L TKA; adv R knee OA\par \par R knee euflexxa 2 tolerated well, return 1 week to continue

## 2023-07-26 NOTE — HISTORY OF PRESENT ILLNESS
[4] : 4 [3] : 3 [2] : 2 [Euflexxa] : Euflexxa [] : Post Surgical Visit: yes [de-identified] : 72M p/w sam knee pain L>R today. Has known advanced bilateral knee OA. Has tried visco and CSI which help temporarily. he has tried PT and NSAIDs previously. He is unsure if he is ready for TKA at this time.\par 1/18/23: f/u sam knees, still having pain and weakness in both his knees, considering TKA\par 2/1/23: Here for Euflexxa #2 L knee \par 2/8/23 euflexxa 3 L knee improving\par 3/22/23 f/u sam knees, some relief from gel series but still having moments of sevfre pain, especially with stairs, would like to discuss TKA\par \par 6/21/23: 3 weeks s/p L TKA. Notes he is gradually improving. Taking oxy prn. \par 7/19/23 7 weeks s/p L TKA, improving with PT, pain is steadily improving, denies n/v/f/c\par 7/26/23: Here for Euflexxa #2 R knee  [FreeTextEntry1] : left knee  [FreeTextEntry5] : Patient is here for a follow up on the RT Knee. INJ#2 [de-identified] : 07/19/23 [de-identified] : 5/30/23 [de-identified] : LT TKA

## 2023-08-02 ENCOUNTER — APPOINTMENT (OUTPATIENT)
Dept: ORTHOPEDIC SURGERY | Facility: CLINIC | Age: 73
End: 2023-08-02

## 2023-08-08 ENCOUNTER — APPOINTMENT (OUTPATIENT)
Dept: RADIATION ONCOLOGY | Facility: CLINIC | Age: 73
End: 2023-08-08
Payer: MEDICARE

## 2023-08-08 VITALS
HEART RATE: 74 BPM | BODY MASS INDEX: 30.27 KG/M2 | HEIGHT: 70 IN | SYSTOLIC BLOOD PRESSURE: 137 MMHG | TEMPERATURE: 98.8 F | WEIGHT: 211.42 LBS | OXYGEN SATURATION: 98 % | DIASTOLIC BLOOD PRESSURE: 68 MMHG

## 2023-08-08 DIAGNOSIS — C61 MALIGNANT NEOPLASM OF PROSTATE: ICD-10-CM

## 2023-08-08 PROCEDURE — 99212 OFFICE O/P EST SF 10 MIN: CPT

## 2023-08-08 RX ORDER — 70%ISOPROPYL ALCOHOL 0.7 ML/ML
70 SWAB TOPICAL
Refills: 0 | Status: DISCONTINUED | COMMUNITY
End: 2023-08-08

## 2023-08-08 RX ORDER — DIPHENOXYLATE HYDROCHLORIDE AND ATROPINE SULFATE 2.5; .025 MG/1; MG/1
2.5-0.025 TABLET ORAL
Qty: 120 | Refills: 0 | Status: DISCONTINUED | COMMUNITY
Start: 2019-03-25 | End: 2023-08-08

## 2023-08-08 RX ORDER — HYDROCORTISONE ACETATE AND PRAMOXINE HYDROCHLORIDE 25; 18 MG/1; MG/1
25-18 SUPPOSITORY RECTAL AT BEDTIME
Qty: 12 | Refills: 2 | Status: ACTIVE | COMMUNITY
Start: 2022-10-28 | End: 1900-01-01

## 2023-08-08 RX ORDER — LIDOCAINE 5 G/100G
5 OINTMENT TOPICAL
Qty: 36 | Refills: 0 | Status: DISCONTINUED | COMMUNITY
Start: 2022-05-06 | End: 2023-08-08

## 2023-08-08 RX ORDER — SENNOSIDES 8.6 MG/1
CAPSULE, GELATIN COATED ORAL
Refills: 0 | Status: ACTIVE | COMMUNITY

## 2023-08-08 RX ORDER — HYDROCORTISONE 25 MG/G
2.5 CREAM TOPICAL
Qty: 1 | Refills: 3 | Status: DISCONTINUED | COMMUNITY
Start: 2021-10-11 | End: 2023-08-08

## 2023-08-08 NOTE — REVIEW OF SYSTEMS
[Fatigue] : fatigue [Constipation] : constipation [Diarrhea] : diarrhea [Nocturia] : nocturia [Urinary Frequency] : urinary frequency [IPSS Score (0-40): ___] : IPSS score: [unfilled] [EPIC-CP Score (0-60): ___] : EPIC-CP score: [unfilled] [Negative] : Heme/Lymph [Anal Pain: Grade 1 - Mild pain] : Anal Pain: Grade 1 - Mild pain [Constipation: Grade 0] : Constipation: Grade 0 [Diarrhea: Grade 0] : Diarrhea: Grade 0 [Proctitis: Grade 1 - Rectal discomfort, intervention not indicated] : Proctitis: Grade 1 - Rectal discomfort, intervention not indicated [Rectal Pain: Grade 1 - Mild pain] : Rectal Pain: Grade 1 - Mild pain [Fatigue: Grade 1 - Fatigue relieved by rest] : Fatigue: Grade 1 - Fatigue relieved by rest [Hematuria: Grade 0] : Hematuria: Grade 0 [Urinary Incontinence: Grade 1 - Occasional (e.g., with coughing, sneezing, etc.), pads not indicated] : Urinary Incontinence: Grade 1 - Occasional (e.g., with coughing, sneezing, etc.), pads not indicated [Urinary Retention: Grade 1 - Urinary, suprapubic or intermittent catheter placement not indicated; able to void with some residual] : Urinary Retention: Grade 1 - Urinary, suprapubic or intermittent catheter placement not indicated; able to void with some residual [Urinary Tract Pain: Grade 0] : Urinary Tract Pain: Grade 0 [Urinary Urgency: Grade 0] : Urinary Urgency: Grade 0 [Urinary Frequency: Grade 1 - Present] : Urinary Frequency: Grade 1 - Present [Ejaculation Disorder: Grade 1 - Diminished ejaculation] : Ejaculation Disorder: Grade 1 - Diminished ejaculation  [Erectile Dysfunction: Grade 1 - Decrease in erectile function (frequency or rigidity of erections) but intervention not indicated (e.g., medication or use of mechanical device, penile pump)] : Erectile Dysfunction: Grade 1 - Decrease in erectile function (frequency or rigidity of erections) but intervention not indicated (e.g., medication or use of mechanical device, penile pump) [FreeTextEntry7] : continues with on and off rectal discomfort/pain [FreeTextEntry9] : left knee discomfort  [FreeTextEntry2] : leakage [FreeTextEntry3] : occasional feeling of incomplete emptying

## 2023-08-08 NOTE — VITALS
[Maximal Pain Intensity: 0/10] : 0/10 [NoTreatment Scheduled] : no treatment scheduled [Least Pain Intensity: 0/10] : 0/10 [Maximal Pain Intensity: 5/10] : 5/10 [Pain Description/Quality: ___] : Pain description/quality: [unfilled] [Pain Duration: ___] : Pain duration: [unfilled] [Pain Location: ___] : Pain Location: [unfilled] [OTC] : OTC [Opioid] : opioid [ECOG Performance Status: 1 - Restricted in physically strenuous activity but ambulatory and able to carry out work of a light or sedentary nature] : Performance Status: 1 - Restricted in physically strenuous activity but ambulatory and able to carry out work of a light or sedentary nature, e.g., light house work, office work

## 2023-08-08 NOTE — HISTORY OF PRESENT ILLNESS
[FreeTextEntry1] : Mr. Medrano is a 71 y/o male with h/o kidney transplant (bilateral nephrectomy) in 2018, HTN, DM2 with prostate cancer.   He is being seen in follow up visit.     Diagnosis: 3/16/21 Unfavorable intermediate risk prostate cancer, G 4+3=7, PSA 5.27 ng/mL, MRI T2N0  RADIATION Treatment: (Dr Calderon) Treatment Site: Prostate/SV   7,000 cGy from 7/09/2021 - 8/27/2021 with ADT Clinical Response: Tolerated the treatment well.  Orgovyx delivered by Dr Phillip   PSA Trend-   5/25/18 - 3.41 ng/mL  10/26/20 - 4.29 12/7/20 - 5.27 10/20/21 - 0.01 1/19/22 - 0.03 10/6/22 - 0.02 2/16/23 - 0.02 7/20/23 - 0.04  Interval Labs/Imaging/Procedures:  1/7/22 - US Transplant Kidney: Mild hydronephrosis, increased compared to prior study. Ureteral stent. Redemonstrated urothelial thickening. Elevated resistive indices, similar to prior examinations. No evidence of a significant renal artery stenosis.  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 Kumar (colorectal)  04/20/2022 - He presented for routine follow-up.  He continues to follow with Dr. Phillip (urology).   Mr. Gay is complaining of rectal pain, s/p procedure on 4/5/22... taking Tylenol prn.  Urinary symptoms are stable, has some issues with weak stream and dribbling at times.   IPSS 9 / EPIC 23  8/15/22 - underwent TURP, stent exchange with Dr. Phillip (urology).  Pathology - Prostate chips, necrotic tissue   10/28/22 - presented for follow up visit.  Reports urinary leakage unchanged. Denies dysuria. Reports urinary frequency, unchanged. Had episode of hematuria which resolved. Found to be a UTI treated with cipro. Denies any other episode. Reports proctitis unchanged.  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 This is improving but he still struggles with rectal pain, especially on passing stool. No blood but occasional mucous. Try pramoxine suppositories.  IPSS 12 / EPIC 21 Follow up 6 months with PSA.   8/8/23 - presents for follow up visit.  Mr. Medrano is doing alright.  He still complains of on and off rectal discomfort with bowel movements, denies any blood.  Urinary issues with leakage, frequency, and nocturia 3x persist, continues on Tamsulosin.  He continues to follow closely with Dr. Phillip (urology) last seen 7/20/23 and is to have ureteral stent replaced in September. IPSS 15 / QOL 3 / EPIC 29

## 2023-08-09 ENCOUNTER — APPOINTMENT (OUTPATIENT)
Dept: ORTHOPEDIC SURGERY | Facility: CLINIC | Age: 73
End: 2023-08-09
Payer: MEDICARE

## 2023-08-09 VITALS — WEIGHT: 211 LBS | HEIGHT: 70 IN | BODY MASS INDEX: 30.21 KG/M2

## 2023-08-09 PROCEDURE — 20610 DRAIN/INJ JOINT/BURSA W/O US: CPT | Mod: 79,RT

## 2023-08-09 PROCEDURE — 99214 OFFICE O/P EST MOD 30 MIN: CPT | Mod: 25

## 2023-08-09 NOTE — DISCUSSION/SUMMARY
[de-identified] : The incision was inspected and was clean and dry with no drainage.  The patient was instructed to call for fevers, chills, wound drainage, wound opening, redness, or any other concerns.\par  \par  The patient is doing well at this time. The patient will be started on a course of physical therapy. I recommended that the patient works on range of motion at home and was shown how to do this. I encouraged the patient to increase ambulation. The patient can continue to take Tylenol for occasional discomfort. The patient was advised not to do any dental work for the first three months following the surgery. We will see the patient  back for a follow-up for a repeat evaluation. The patient will call or return earlier for any questions or concerns.  Signs and symptoms of infection reviewed and patient advised to call immediately for redness, fevers, and/or chills.\par  \par  The risks, benefits, contents and alternatives to injection were explained in full to the patient. Risks outlined include but are not limited to infection, sepsis, bleeding, scarring, skin discoloration, temporary increase in pain, syncopal episode, failure to resolve symptoms, allergic reaction, flare reaction, permanent white skin discoloration, symptom recurrence, and elevation of blood sugar in diabetics. Patient understood the risks. All questions were answered. After discussion of options, patient requested an injection. Oral informed consent was obtained and sterile prep was done of the injection site. Sterile technique was used to introduce the mixture. Patient tolerated the procedure well. Patient advised to ice the injection site this evening. Signs and symptoms of infection reviewed and patient advised to call immediately for redness, fevers, and/or chills.\par   \par  Progress note completed by Tara Pretty PA-C

## 2023-08-09 NOTE — ASSESSMENT
[FreeTextEntry1] : 72M 9 weeks s/p L TKA; adv R knee OA  R knee euflexxa 3 tolerated well, return 6 week to continue

## 2023-08-09 NOTE — PHYSICAL EXAM
[de-identified] : LEFT KNEE\par  Incision is clean and dry with no drainage - dressing removed -\par  Sensation intact\par  Motor 5/5 \par  +1 edema LE\par  ROM 3-120\par  \par  Xray 3 views Left knee - Implants good position and well fixed \par  \par  RLE:\par  MJT+\par  rom 7-125\par  motor intach ehl fhl ta g\par  silt sp dp s s pt\par  toes wwp bcr

## 2023-08-09 NOTE — PROCEDURE
[Large Joint Injection] : Large joint injection [Right] : of the right [Knee] : knee [Pain] : pain [Inflammation] : inflammation [X-ray evidence of Osteoarthritis on this or prior visit] : x-ray evidence of Osteoarthritis on this or prior visit [Alcohol] : alcohol [Betadine] : betadine [Ethyl Chloride sprayed topically] : ethyl chloride sprayed topically [Sterile technique used] : sterile technique used [Euflexxa(20mg)] : 20mg of Euflexxa [#3] : series #3 [] : Patient tolerated procedure well [Call if redness, pain or fever occur] : call if redness, pain or fever occur [Apply ice for 15min out of every hour for the next 12-24 hours as tolerated] : apply ice for 15 minutes out of every hour for the next 12-24 hours as tolerated [Patient was advised to rest the joint(s) for ____ days] : patient was advised to rest the joint(s) for [unfilled] days [Previous OTC use and PT nontherapeutic] : patient has tried OTC's including aspirin, Ibuprofen, Aleve, etc or prescription NSAIDS, and/or exercises at home and/or physical therapy without satisfactory response [Patient had decreased mobility in the joint] : patient had decreased mobility in the joint [Risks, benefits, alternatives discussed / Verbal consent obtained] : the risks benefits, and alternatives have been discussed, and verbal consent was obtained

## 2023-08-09 NOTE — HISTORY OF PRESENT ILLNESS
[4] : 4 [Dull/Aching] : dull/aching [3] : 3 [Euflexxa] : Euflexxa [de-identified] : 72M p/w sam knee pain L>R today. Has known advanced bilateral knee OA. Has tried visco and CSI which help temporarily. he has tried PT and NSAIDs previously. He is unsure if he is ready for TKA at this time. 1/18/23: f/u sam knees, still having pain and weakness in both his knees, considering TKA 2/1/23: Here for Euflexxa #2 L knee  2/8/23 euflexxa 3 L knee improving 3/22/23 f/u sam knees, some relief from gel series but still having moments of sevfre pain, especially with stairs, would like to discuss TKA  6/21/23: 3 weeks s/p L TKA. Notes he is gradually improving. Taking oxy prn.  7/19/23 7 weeks s/p L TKA, improving with PT, pain is steadily improving, denies n/v/f/c 7/26/23: Here for Euflexxa #2 R knee  8/9/23 euflexxa 3 today [] : no [FreeTextEntry1] : R knee [de-identified] : 07/26/23

## 2023-08-10 ENCOUNTER — NON-APPOINTMENT (OUTPATIENT)
Age: 73
End: 2023-08-10

## 2023-08-10 ENCOUNTER — APPOINTMENT (OUTPATIENT)
Dept: NEPHROLOGY | Facility: CLINIC | Age: 73
End: 2023-08-10
Payer: MEDICARE

## 2023-08-10 ENCOUNTER — OUTPATIENT (OUTPATIENT)
Dept: OUTPATIENT SERVICES | Facility: HOSPITAL | Age: 73
LOS: 1 days | End: 2023-08-10

## 2023-08-10 VITALS
HEART RATE: 72 BPM | OXYGEN SATURATION: 98 % | HEIGHT: 68.5 IN | TEMPERATURE: 99 F | RESPIRATION RATE: 16 BRPM | SYSTOLIC BLOOD PRESSURE: 121 MMHG | DIASTOLIC BLOOD PRESSURE: 60 MMHG | WEIGHT: 212.08 LBS

## 2023-08-10 VITALS
OXYGEN SATURATION: 96 % | SYSTOLIC BLOOD PRESSURE: 136 MMHG | BODY MASS INDEX: 30.06 KG/M2 | HEIGHT: 70 IN | HEART RATE: 82 BPM | WEIGHT: 210 LBS | DIASTOLIC BLOOD PRESSURE: 71 MMHG | RESPIRATION RATE: 16 BRPM | TEMPERATURE: 98 F

## 2023-08-10 DIAGNOSIS — A63.0 ANOGENITAL (VENEREAL) WARTS: Chronic | ICD-10-CM

## 2023-08-10 DIAGNOSIS — N39.46 MIXED INCONTINENCE: ICD-10-CM

## 2023-08-10 DIAGNOSIS — Z98.890 OTHER SPECIFIED POSTPROCEDURAL STATES: Chronic | ICD-10-CM

## 2023-08-10 DIAGNOSIS — Z90.5 ACQUIRED ABSENCE OF KIDNEY: Chronic | ICD-10-CM

## 2023-08-10 DIAGNOSIS — Z96.652 PRESENCE OF LEFT ARTIFICIAL KNEE JOINT: Chronic | ICD-10-CM

## 2023-08-10 DIAGNOSIS — E11.9 TYPE 2 DIABETES MELLITUS WITHOUT COMPLICATIONS: ICD-10-CM

## 2023-08-10 DIAGNOSIS — T86.19 OTHER COMPLICATION OF KIDNEY TRANSPLANT: ICD-10-CM

## 2023-08-10 DIAGNOSIS — Z90.79 ACQUIRED ABSENCE OF OTHER GENITAL ORGAN(S): Chronic | ICD-10-CM

## 2023-08-10 DIAGNOSIS — Z94.0 KIDNEY TRANSPLANT STATUS: Chronic | ICD-10-CM

## 2023-08-10 DIAGNOSIS — I10 ESSENTIAL (PRIMARY) HYPERTENSION: ICD-10-CM

## 2023-08-10 DIAGNOSIS — Z91.89 OTHER SPECIFIED PERSONAL RISK FACTORS, NOT ELSEWHERE CLASSIFIED: ICD-10-CM

## 2023-08-10 LAB
A1C WITH ESTIMATED AVERAGE GLUCOSE RESULT: 6.1 % — HIGH (ref 4–5.6)
ALBUMIN SERPL ELPH-MCNC: 4 G/DL — SIGNIFICANT CHANGE UP (ref 3.3–5)
ALBUMIN SERPL ELPH-MCNC: 4.1 G/DL
ALP BLD-CCNC: 111 U/L
ALP SERPL-CCNC: 108 U/L — SIGNIFICANT CHANGE UP (ref 40–120)
ALT FLD-CCNC: 11 U/L — SIGNIFICANT CHANGE UP (ref 4–41)
ALT SERPL-CCNC: 10 U/L
ANION GAP SERPL CALC-SCNC: 12 MMOL/L
ANION GAP SERPL CALC-SCNC: 13 MMOL/L — SIGNIFICANT CHANGE UP (ref 7–14)
APPEARANCE: CLEAR
AST SERPL-CCNC: 20 U/L — SIGNIFICANT CHANGE UP (ref 4–40)
AST SERPL-CCNC: 21 U/L
BACTERIA: NEGATIVE /HPF
BILIRUB SERPL-MCNC: 0.2 MG/DL
BILIRUB SERPL-MCNC: 0.2 MG/DL — SIGNIFICANT CHANGE UP (ref 0.2–1.2)
BILIRUBIN URINE: NEGATIVE
BLOOD URINE: NEGATIVE
BUN SERPL-MCNC: 26 MG/DL
BUN SERPL-MCNC: 26 MG/DL — HIGH (ref 7–23)
CALCIUM SERPL-MCNC: 10.1 MG/DL — SIGNIFICANT CHANGE UP (ref 8.4–10.5)
CALCIUM SERPL-MCNC: 9.8 MG/DL
CALCIUM SERPL-MCNC: 9.8 MG/DL
CAST: 2 /LPF
CHLORIDE SERPL-SCNC: 107 MMOL/L — SIGNIFICANT CHANGE UP (ref 98–107)
CHLORIDE SERPL-SCNC: 108 MMOL/L
CO2 SERPL-SCNC: 18 MMOL/L — LOW (ref 22–31)
CO2 SERPL-SCNC: 19 MMOL/L
COLOR: YELLOW
CREAT SERPL-MCNC: 2.74 MG/DL — HIGH (ref 0.5–1.3)
CREAT SERPL-MCNC: 2.94 MG/DL
CREAT SPEC-SCNC: 201 MG/DL
CREAT/PROT UR: 0.1 RATIO
EGFR: 22 ML/MIN/1.73M2
EGFR: 24 ML/MIN/1.73M2 — LOW
EPITHELIAL CELLS: 1 /HPF
ESTIMATED AVERAGE GLUCOSE: 128 — SIGNIFICANT CHANGE UP
GLUCOSE QUALITATIVE U: NEGATIVE MG/DL
GLUCOSE SERPL-MCNC: 100 MG/DL — HIGH (ref 70–99)
GLUCOSE SERPL-MCNC: 97 MG/DL
HCT VFR BLD CALC: 36 % — LOW (ref 39–50)
HGB BLD-MCNC: 11.3 G/DL — LOW (ref 13–17)
KETONES URINE: NEGATIVE MG/DL
LEUKOCYTE ESTERASE URINE: ABNORMAL
MCHC RBC-ENTMCNC: 30.6 PG — SIGNIFICANT CHANGE UP (ref 27–34)
MCHC RBC-ENTMCNC: 31.4 GM/DL — LOW (ref 32–36)
MCV RBC AUTO: 97.6 FL — SIGNIFICANT CHANGE UP (ref 80–100)
MICROSCOPIC-UA: NORMAL
NITRITE URINE: NEGATIVE
NRBC # BLD: 0 /100 WBCS — SIGNIFICANT CHANGE UP (ref 0–0)
NRBC # FLD: 0.02 K/UL — HIGH (ref 0–0)
PARATHYROID HORMONE INTACT: 41 PG/ML
PH URINE: 5.5
PLATELET # BLD AUTO: 193 K/UL — SIGNIFICANT CHANGE UP (ref 150–400)
POTASSIUM SERPL-MCNC: 4.9 MMOL/L — SIGNIFICANT CHANGE UP (ref 3.5–5.3)
POTASSIUM SERPL-SCNC: 4.5 MMOL/L
POTASSIUM SERPL-SCNC: 4.9 MMOL/L — SIGNIFICANT CHANGE UP (ref 3.5–5.3)
PROT SERPL-MCNC: 6.6 G/DL
PROT SERPL-MCNC: 7.3 G/DL — SIGNIFICANT CHANGE UP (ref 6–8.3)
PROT UR-MCNC: 25 MG/DL
PROTEIN URINE: 30 MG/DL
RBC # BLD: 3.69 M/UL — LOW (ref 4.2–5.8)
RBC # FLD: 15.7 % — HIGH (ref 10.3–14.5)
RED BLOOD CELLS URINE: 0 /HPF
SODIUM SERPL-SCNC: 138 MMOL/L — SIGNIFICANT CHANGE UP (ref 135–145)
SODIUM SERPL-SCNC: 139 MMOL/L
SPECIFIC GRAVITY URINE: 1.02
TACROLIMUS SERPL-MCNC: 4.3 NG/ML
UROBILINOGEN URINE: 0.2 MG/DL
WBC # BLD: 6.87 K/UL — SIGNIFICANT CHANGE UP (ref 3.8–10.5)
WBC # FLD AUTO: 6.87 K/UL — SIGNIFICANT CHANGE UP (ref 3.8–10.5)
WHITE BLOOD CELLS URINE: 7 /HPF

## 2023-08-10 PROCEDURE — 99214 OFFICE O/P EST MOD 30 MIN: CPT

## 2023-08-10 RX ORDER — PREGABALIN 225 MG/1
1 CAPSULE ORAL
Qty: 0 | Refills: 0 | DISCHARGE

## 2023-08-10 RX ORDER — GLIMEPIRIDE 1 MG
1 TABLET ORAL
Qty: 0 | Refills: 0 | DISCHARGE

## 2023-08-10 RX ORDER — DOCUSATE SODIUM 100 MG
1 CAPSULE ORAL
Qty: 0 | Refills: 0 | DISCHARGE

## 2023-08-10 RX ORDER — ALLOPURINOL 300 MG
1 TABLET ORAL
Qty: 0 | Refills: 0 | DISCHARGE

## 2023-08-10 RX ORDER — ERGOCALCIFEROL 1.25 MG/1
1 CAPSULE ORAL
Qty: 0 | Refills: 0 | DISCHARGE

## 2023-08-10 RX ORDER — PYRIDOXINE HCL (VITAMIN B6) 100 MG
1 TABLET ORAL
Qty: 0 | Refills: 0 | DISCHARGE

## 2023-08-10 RX ORDER — TACROLIMUS 5 MG/1
1 CAPSULE ORAL
Qty: 0 | Refills: 0 | DISCHARGE

## 2023-08-10 RX ORDER — CITRIC ACID/SODIUM CITRATE 300-500 MG
0 SOLUTION, ORAL ORAL
Qty: 0 | Refills: 0 | DISCHARGE

## 2023-08-10 RX ORDER — SODIUM CITRATE AND CITRIC ACID 334; 500 MG/5ML; MG/5ML
500-334 SOLUTION ORAL 3 TIMES DAILY
Qty: 4 | Refills: 3 | Status: ACTIVE | COMMUNITY
Start: 2018-09-27 | End: 1900-01-01

## 2023-08-10 RX ORDER — FUROSEMIDE 40 MG
1 TABLET ORAL
Qty: 0 | Refills: 0 | DISCHARGE

## 2023-08-10 RX ORDER — FOLIC ACID 0.8 MG
1 TABLET ORAL
Qty: 0 | Refills: 0 | DISCHARGE

## 2023-08-10 RX ORDER — TAMSULOSIN HYDROCHLORIDE 0.4 MG/1
2 CAPSULE ORAL
Qty: 0 | Refills: 0 | DISCHARGE

## 2023-08-10 RX ORDER — NIFEDIPINE 30 MG
1 TABLET, EXTENDED RELEASE 24 HR ORAL
Qty: 0 | Refills: 0 | DISCHARGE

## 2023-08-10 RX ORDER — ACETAMINOPHEN 500 MG
2 TABLET ORAL
Qty: 0 | Refills: 0 | DISCHARGE

## 2023-08-10 NOTE — H&P PST ADULT - FUNCTIONAL STATUS
physical therapy 2x/week, climbs a flight of stairs several times a day at home  Denies chest pain, shortness of breath, palpitations, weakness, headaches or changes in vision/4-10 METS

## 2023-08-10 NOTE — H&P PST ADULT - NSICDXPASTMEDICALHX_GEN_ALL_CORE_FT
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 C : post treatment: patient  tested Negative for Hep C    HTN (hypertension)     Medial meniscus tear ' 90's  Right    OA (osteoarthritis)     Other complication of kidney transplant     Prostate cancer s/p RT    Renal cell carcinoma s/p b/l nephrectomy and renal transplant in 2018    Type 2 diabetes mellitus dx: '88    Ureteral stricture of kidney transplant 2018    Urine retention

## 2023-08-10 NOTE — H&P PST ADULT - PROBLEM SELECTOR PLAN 3
Patient instructed to take toprol, procardia on day of procedure with small sips of water, verbalized understanding.

## 2023-08-10 NOTE — H&P PST ADULT - NSICDXFAMILYHX_GEN_ALL_CORE_FT
Statement Selected FAMILY HISTORY:  Father  Still living? Unknown  FH: type 2 diabetes, Age at diagnosis: Age Unknown    Mother  Still living? Unknown  FH: breast cancer, Age at diagnosis: Age Unknown    Sibling  Still living? No  FH: heart attack, Age at diagnosis: Age Unknown

## 2023-08-10 NOTE — H&P PST ADULT - NSICDXPASTSURGICALHX_GEN_ALL_CORE_FT
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 s/p Reversal    Kidney transplant recipient 2018  @ [facility] :  +  Hep C Donor    S/P anal fissurectomy and chemical denervation  with biopsy and fulguration of anal lesions, 2021    S/p nephrectomy left 9/15/2015   + Cancer    S/p nephrectomy right 12/10/2015   benign    S/P right knee arthroscopy     S/P total knee replacement, left     S/P TURP (transurethral resection of prostate) and Transplant Kidney Stent Replacemernt, 21

## 2023-08-10 NOTE — ASSESSMENT
[FreeTextEntry1] : 1. CKD of kidney transplantation - Pts aaron creatinine 1.9 but had CHELA with pyelonephritis. Has had multiple episodes of CHELA.  Pt in process of relisting but was on hold due to prostate cancer and now GFR too high.  Cell free DNA 0.2% in October end.  Last creatinine 2.4.  Pt also has internal JJ stent being exchanged by Dr. Phillip.  Next exchange in this month - 8/ 2023.  2. Immunosuppression - simulect induction, tacrolimus target 5-7, MMF 250mgs BID and pred 5.   3. DM2 - on glimepiride. 4. HTN - controlled at home.   5. HCV - genotype 3a. completed Epclusa. Last vl note detected.  6. Anemia - due to CKD/CHELA.  last Hb at goal.  7. Chronic diarrhea - on and off on lomotil prn. Has seen GI and had a colonoscopy in 2019. Has history of small bowel resection which may be the cause of diarrhea.  8. 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  9.  UTI - last culture consistent with contamination.  No serious infections since last TURP and stent exchange.  10.  COVID19 -  Resolved.  Received monoclonal antibody and recently vaccinated.   11.  Prostate cancer - Completed androgen depletion and completed RT.   12.  Urine retention - now s/p TURP then removal of necrotic prostate debris.  He will f/u with Dr. Phillip.  Has some incontinence  13.  Anal fissure s/p surgery -  Following with colorectal surgery and GI  f/u in 3 months.

## 2023-08-10 NOTE — H&P PST ADULT - PROBLEM SELECTOR PLAN 4
Patient instructed on medications adjustments: Hold p.m. dose 8/21/23 and on DOS  POCT glucose testing upon admission Patient instructed on medications adjustments: Hold Glimepiride 8/21/23 p.m. dose and on DOS a.m. dose   POCT glucose testing upon admission

## 2023-08-10 NOTE — H&P PST ADULT - GASTROINTESTINAL COMMENTS
s/p ileostomy and reversal multiple scars history of ileostomy and closure multiple scars history of ileostomy reversal

## 2023-08-10 NOTE — H&P PST ADULT - GENERAL COMMENTS
covid booster last dose on 5/6/23, Denies recent illness in the last 2 weeks, denies cold, flu and viral infection. Denies recent illness in the last 2 weeks, denies cold, flu and viral infection.

## 2023-08-10 NOTE — H&P PST ADULT - PROBLEM SELECTOR PLAN 1
Patient scheduled for surgery on: 8/22/23  Patient provided with verbal and written presurgical instructions      Lab specimen drawn at PST today: cbc, cmp, hga1c, urine cx    Medical evaluation requested by PST for further evaluation multiple comorbidities, will obtain  Echo, cath and Stress test in chart    Patient instructed to take cellept, bactrim, prednisone tacrolimus as instructed Patient scheduled for surgery on: 8/22/23  Patient provided with verbal and written presurgical instructions    Lab specimen drawn at PST today: cbc, cmp, hga1c, urine cx    Medical evaluation requested by PST for further evaluation multiple comorbidities, will obtain  Echo, cath and Stress test in chart    Patient instructed to take cellept, bactrim, prednisone tacrolimus as instructed

## 2023-08-10 NOTE — HISTORY OF PRESENT ILLNESS
[80: Normal activity with effort; some signs or symptoms of disease.] : 80: Normal activity with effort; some signs or symptoms of disease.  [FreeTextEntry1] : Tube exchanged October 29th.  Pt diagnosed with Covid, wife was diagnosed with Covid on Jan 28th 2021.  On 29th pt was tested in an urgent care, tested positive and went to [place].  Had no symptoms.  Received the monoclonal antibody on the 29th.  Creatinine in 3's in [facility] and he was admitted and received 5 days of IV antibiotics for urine infection.  Pt admitted June 2nd 2021 for CHELA and hyperkalemia.  Found to have UTI and treated.  Had pseudomonas in urine and received zosyn.  CHELA improved before discharge.  Creatinine decreased to 2.7 before discharge.  Stent internalized  Receiving RT therapy for prostate. Has had admissions with urinary retention after RT started.   Saw an oncologist who recently performed labs and mentioned that creatinine was high.  Pt still has armenta catheter and was advised to have a TURP.  Pt will be seeing Dr. Collins this Thursday.  Had a positive urine culture end of September.    friend Dr. Sean Salinas [phone].  Pt was admitted to [facility] 11/5 for IV abx prior to planned TURP 11/8.  11/9/21 - s/p TURP and transplant kidney stent replacement.  Armenta removed. Pt voided very light pink about 250 cc.   Pt has followed up with Dr. Phillip  [de-identified] : Creatinine 2.4 last.  Had left TKR May 30th.  Still has some pain but overall ok.  Right knee s/p intra-articular injection.  Has occasional diarrhea.  Blood pressure and sugar is controlled.  Has urinary incontinence and following with Dr. Phillip.  Going for ureteral stent exchange later this month.

## 2023-08-10 NOTE — H&P PST ADULT - HISTORY OF PRESENT ILLNESS
73 y/o male, PMH of HTN, T2DM ESRD and kidney cancer and prostate cancer with chemo, kidney transplant recipient  in 2018, with c/o urinary retention requiring stent now scheduled for cystoscopy, exchange of right ureteral stent    71 y/o male, PMH of HTN, T2DM,Hep C, ESRD and kidney and prostate cancer s/p chemo, kidney transplant recipient  in 2018, Patient reports symptoms of urinary retention requiring stent now scheduled for cystoscopy, exchange of right ureteral stent

## 2023-08-10 NOTE — H&P PST ADULT - MUSCULOSKELETAL
details… strength 5/5 bilateral upper extremities strength 5/5 bilateral upper extremities/abnormal gait

## 2023-08-11 PROBLEM — M19.90 UNSPECIFIED OSTEOARTHRITIS, UNSPECIFIED SITE: Chronic | Status: ACTIVE | Noted: 2023-08-10

## 2023-08-11 PROBLEM — T86.19 OTHER COMPLICATION OF KIDNEY TRANSPLANT: Chronic | Status: ACTIVE | Noted: 2023-08-10

## 2023-08-11 PROBLEM — R33.9 RETENTION OF URINE, UNSPECIFIED: Chronic | Status: ACTIVE | Noted: 2023-08-10

## 2023-08-14 LAB
-  AMPICILLIN: SIGNIFICANT CHANGE UP
-  CIPROFLOXACIN: SIGNIFICANT CHANGE UP
-  LEVOFLOXACIN: SIGNIFICANT CHANGE UP
-  NITROFURANTOIN: SIGNIFICANT CHANGE UP
-  TETRACYCLINE: SIGNIFICANT CHANGE UP
-  VANCOMYCIN: SIGNIFICANT CHANGE UP
BKV DNA SPEC QL NAA+PROBE: NOT DETECTED IU/ML
CMV DNA SPEC QL NAA+PROBE: NOT DETECTED IU/ML
CMVPCR LOG: NOT DETECTED LOG10IU/ML
CULTURE RESULTS: SIGNIFICANT CHANGE UP
METHOD TYPE: SIGNIFICANT CHANGE UP
ORGANISM # SPEC MICROSCOPIC CNT: SIGNIFICANT CHANGE UP
ORGANISM # SPEC MICROSCOPIC CNT: SIGNIFICANT CHANGE UP
SPECIMEN SOURCE: SIGNIFICANT CHANGE UP

## 2023-08-15 DIAGNOSIS — N39.0 URINARY TRACT INFECTION, SITE NOT SPECIFIED: ICD-10-CM

## 2023-08-15 DIAGNOSIS — A49.9 URINARY TRACT INFECTION, SITE NOT SPECIFIED: ICD-10-CM

## 2023-08-16 ENCOUNTER — OUTPATIENT (OUTPATIENT)
Dept: OUTPATIENT SERVICES | Facility: HOSPITAL | Age: 73
LOS: 1 days | End: 2023-08-16
Payer: MEDICARE

## 2023-08-16 ENCOUNTER — APPOINTMENT (OUTPATIENT)
Dept: ULTRASOUND IMAGING | Facility: HOSPITAL | Age: 73
End: 2023-08-16
Payer: MEDICARE

## 2023-08-16 DIAGNOSIS — Z98.890 OTHER SPECIFIED POSTPROCEDURAL STATES: Chronic | ICD-10-CM

## 2023-08-16 DIAGNOSIS — Z90.5 ACQUIRED ABSENCE OF KIDNEY: Chronic | ICD-10-CM

## 2023-08-16 DIAGNOSIS — Z94.0 KIDNEY TRANSPLANT STATUS: ICD-10-CM

## 2023-08-16 DIAGNOSIS — A63.0 ANOGENITAL (VENEREAL) WARTS: Chronic | ICD-10-CM

## 2023-08-16 DIAGNOSIS — Z96.652 PRESENCE OF LEFT ARTIFICIAL KNEE JOINT: Chronic | ICD-10-CM

## 2023-08-16 DIAGNOSIS — I77.0 ARTERIOVENOUS FISTULA, ACQUIRED: Chronic | ICD-10-CM

## 2023-08-16 DIAGNOSIS — Z90.79 ACQUIRED ABSENCE OF OTHER GENITAL ORGAN(S): Chronic | ICD-10-CM

## 2023-08-16 DIAGNOSIS — Z94.0 KIDNEY TRANSPLANT STATUS: Chronic | ICD-10-CM

## 2023-08-16 PROCEDURE — 76776 US EXAM K TRANSPL W/DOPPLER: CPT | Mod: 26,RT

## 2023-08-16 PROCEDURE — 76776 US EXAM K TRANSPL W/DOPPLER: CPT

## 2023-08-17 ENCOUNTER — NON-APPOINTMENT (OUTPATIENT)
Age: 73
End: 2023-08-17

## 2023-08-21 ENCOUNTER — TRANSCRIPTION ENCOUNTER (OUTPATIENT)
Age: 73
End: 2023-08-21

## 2023-08-22 ENCOUNTER — APPOINTMENT (OUTPATIENT)
Dept: UROLOGY | Facility: AMBULATORY SURGERY CENTER | Age: 73
End: 2023-08-22

## 2023-08-22 ENCOUNTER — TRANSCRIPTION ENCOUNTER (OUTPATIENT)
Age: 73
End: 2023-08-22

## 2023-08-22 ENCOUNTER — OUTPATIENT (OUTPATIENT)
Dept: OUTPATIENT SERVICES | Facility: HOSPITAL | Age: 73
LOS: 1 days | Discharge: HOME | End: 2023-08-22
Payer: MEDICARE

## 2023-08-22 VITALS
TEMPERATURE: 97 F | RESPIRATION RATE: 16 BRPM | OXYGEN SATURATION: 100 % | HEART RATE: 65 BPM | DIASTOLIC BLOOD PRESSURE: 80 MMHG | SYSTOLIC BLOOD PRESSURE: 158 MMHG

## 2023-08-22 VITALS
DIASTOLIC BLOOD PRESSURE: 66 MMHG | OXYGEN SATURATION: 97 % | RESPIRATION RATE: 18 BRPM | HEIGHT: 68.5 IN | TEMPERATURE: 98 F | HEART RATE: 71 BPM | WEIGHT: 212.08 LBS | SYSTOLIC BLOOD PRESSURE: 150 MMHG

## 2023-08-22 DIAGNOSIS — Z96.652 PRESENCE OF LEFT ARTIFICIAL KNEE JOINT: Chronic | ICD-10-CM

## 2023-08-22 DIAGNOSIS — I77.0 ARTERIOVENOUS FISTULA, ACQUIRED: Chronic | ICD-10-CM

## 2023-08-22 DIAGNOSIS — N39.46 MIXED INCONTINENCE: ICD-10-CM

## 2023-08-22 DIAGNOSIS — Z98.890 OTHER SPECIFIED POSTPROCEDURAL STATES: Chronic | ICD-10-CM

## 2023-08-22 DIAGNOSIS — Z90.5 ACQUIRED ABSENCE OF KIDNEY: Chronic | ICD-10-CM

## 2023-08-22 DIAGNOSIS — Z94.0 KIDNEY TRANSPLANT STATUS: Chronic | ICD-10-CM

## 2023-08-22 DIAGNOSIS — Z90.79 ACQUIRED ABSENCE OF OTHER GENITAL ORGAN(S): Chronic | ICD-10-CM

## 2023-08-22 DIAGNOSIS — A63.0 ANOGENITAL (VENEREAL) WARTS: Chronic | ICD-10-CM

## 2023-08-22 LAB — GLUCOSE BLDC GLUCOMTR-MCNC: 98 MG/DL — SIGNIFICANT CHANGE UP (ref 70–99)

## 2023-08-22 PROCEDURE — ZZZZZ: CPT

## 2023-08-22 PROCEDURE — 52332 CYSTOSCOPY AND TREATMENT: CPT | Mod: RT,22

## 2023-08-22 DEVICE — STENT RENAL GREENE 7X8-20CM SET TRANSPLANT: Type: IMPLANTABLE DEVICE | Status: FUNCTIONAL

## 2023-08-22 DEVICE — GUIDEWIRE SENSOR DUAL-FLEX NITINOL STRAIGHT .038" X 150CM: Type: IMPLANTABLE DEVICE | Status: FUNCTIONAL

## 2023-08-22 DEVICE — URETERAL CATH OPEN END 5FR 70CM: Type: IMPLANTABLE DEVICE | Status: FUNCTIONAL

## 2023-08-22 DEVICE — URETERAL CATH IMAGER II BERN 5FR 65CM: Type: IMPLANTABLE DEVICE | Status: FUNCTIONAL

## 2023-08-22 RX ORDER — LINEZOLID 600 MG/300ML
1 INJECTION, SOLUTION INTRAVENOUS
Qty: 4 | Refills: 0
Start: 2023-08-22

## 2023-08-22 RX ORDER — SODIUM CHLORIDE 9 MG/ML
1000 INJECTION, SOLUTION INTRAVENOUS
Refills: 0 | Status: DISCONTINUED | OUTPATIENT
Start: 2023-08-22 | End: 2023-09-05

## 2023-08-22 NOTE — BRIEF OPERATIVE NOTE - NSICDXBRIEFPREOP_GEN_ALL_CORE_FT
PRE-OP DIAGNOSIS:  Acquired hydronephrosis 22-Aug-2023 16:09:44 OF TRANSPLANT KIDNEY Essie Clemons

## 2023-08-22 NOTE — ASU DISCHARGE PLAN (ADULT/PEDIATRIC) - ASU DC SPECIAL INSTRUCTIONSFT
STENT: You may have an internal stent (a hollow tube that runs from the kidney to your bladder) after your procedure, which helps urine drain from the kidney to your bladder. Some patients experience urinary frequency, burning, or even back pain (especially with urination). These sensations will gradually get better. Increasing your fluid intake can also improve these symptoms. While the stent is in place, your urine may continue to be bloody. This stent is temporary and must be removed by your urologist as an outpatient with in 3 months unless otherwise specified.  GENERAL: It is common to have blood in your urine after your procedure. It may be pink or even red; inform your doctor if you have a significant amount of clot in the urine or if you are unable to void at all. The urine may clear and then become bloody again especially as you are more physically active.  BATHING: You may shower or bathe.  DIET: You may resume your regular diet and regular medication regimen.  PAIN: You may take Tylenol (acetaminophen) 650-975mg and/or Motrin (ibuprofen) 400-600mg, both available over the counter, for pain every 6 hours as needed. Do not exceed 4000mg of Tylenol (acetaminophen) daily. You may alternate these medications such that you take one or the other every 3 hours for around the clock pain coverage. If you have a stent, the following medications may have been sent to your pharmacy for stent related discomfort: Flomax (tamsulosin) 0.4mg at bedtime until stent removed,   ANTIBIOTICS: You will be given a prescription for an antibiotic, please take this medication as instructed and be sure to complete the entire course.  STOOL SOFTENERS: Do not allow yourself to become constipated as straining may cause bleeding. Take stool softeners or a laxative (ex. Miralax, Colace, Senokot, ExLax, etc), available over the counter, if needed.  ACTIVITY: No heavy lifting or strenuous exercise until you are evaluated at your post-operative appointment. Otherwise, you may return to your usual level of physical activity.  FOLLOW-UP: If you did not already schedule your post-operative appointment, please call your urologist to schedule and follow-up appointment.  CALL YOUR UROLOGIST IF: You have any bleeding that does not stop, inability to void >8 hours, fever over 100.4 F, chills, persistent nausea/vomiting, changes in your incision concerning for infection, or if your pain is not controlled on your discharge pain medications. STENT: You may have an internal stent (a hollow tube that runs from the kidney to your bladder) after your procedure, which helps urine drain from the kidney to your bladder. Some patients experience urinary frequency, burning, or even back pain (especially with urination). These sensations will gradually get better. Increasing your fluid intake can also improve these symptoms. While the stent is in place, your urine may continue to be bloody. This stent is temporary and must be removed by your urologist as an outpatient within 3 months unless otherwise specified.  GENERAL: It is common to have blood in your urine after your procedure. It may be pink or even red; inform your doctor if you have a significant amount of clot in the urine or if you are unable to void at all. The urine may clear and then become bloody again especially as you are more physically active.  BATHING: You may shower or bathe.  DIET: You may resume your regular diet and regular medication regimen.  PAIN: You may take Tylenol (acetaminophen) 650-975mg and/or Motrin (ibuprofen) 400-600mg, both available over the counter, for pain every 6 hours as needed. Do not exceed 4000mg of Tylenol (acetaminophen) daily. You may alternate these medications such that you take one or the other every 3 hours for around the clock pain coverage. If you have a stent, the following medications may have been sent to your pharmacy for stent related discomfort:  ANTIBIOTICS: You will be given a prescription for an antibiotic, please take this medication as instructed and be sure to complete the entire course.  STOOL SOFTENERS: Do not allow yourself to become constipated as straining may cause bleeding. Take stool softeners or a laxative (ex. Miralax, Colace, Senokot, ExLax, etc), available over the counter, if needed.  ACTIVITY: No heavy lifting or strenuous exercise until you are evaluated at your post-operative appointment. Otherwise, you may return to your usual level of physical activity.  FOLLOW-UP: If you did not already schedule your post-operative appointment, please call your urologist to schedule and follow-up appointment.  CALL YOUR UROLOGIST IF: You have any bleeding that does not stop, inability to void >8 hours, fever over 100.4 F, chills, persistent nausea/vomiting, changes in your incision concerning for infection, or if your pain is not controlled on your discharge pain medications.

## 2023-08-22 NOTE — BRIEF OPERATIVE NOTE - NSICDXBRIEFPOSTOP_GEN_ALL_CORE_FT
POST-OP DIAGNOSIS:  Acquired hydronephrosis 22-Aug-2023 16:09:41 OF TRANSPLANT KIDNEY Essie Clemons

## 2023-08-22 NOTE — ASU DISCHARGE PLAN (ADULT/PEDIATRIC) - CARE PROVIDER_API CALL
John Phillip)  Urology  [address] [place] [address], [place] [address]  Phone: [phone]  Fax: [phone]  Follow Up Time:

## 2023-08-22 NOTE — ASU PREOP CHECKLIST - AS BP NONINV SITE
Pink Band LUE/right upper arm Curettage Text: The wound bed was treated with curettage after the biopsy was performed.

## 2023-08-22 NOTE — BRIEF OPERATIVE NOTE - NSICDXBRIEFPROCEDURE_GEN_ALL_CORE_FT
PROCEDURES:  Cystoureteroscopy, with retrograde pyelogram and ureteral stent insertion 22-Aug-2023 16:10:46 RENAL TRANSPLANT EXCHANGE Essei Clemons A

## 2023-09-06 NOTE — ED ADULT NURSE NOTE - TEMPLATE
Suspect like OA that is exacerbated at times  No previous xray for baseline therefore will obtain  Recommend ongoing conservative management  Trial NSAID when significant pain, consider topical such as Voltaren gel  If pain fails to improve then consider formal PT or orthopedics referral   General

## 2023-09-20 ENCOUNTER — APPOINTMENT (OUTPATIENT)
Dept: ORTHOPEDIC SURGERY | Facility: CLINIC | Age: 73
End: 2023-09-20
Payer: MEDICARE

## 2023-09-20 VITALS — BODY MASS INDEX: 30.06 KG/M2 | HEIGHT: 70 IN | WEIGHT: 210 LBS

## 2023-09-20 PROCEDURE — 99214 OFFICE O/P EST MOD 30 MIN: CPT

## 2023-09-20 PROCEDURE — 73562 X-RAY EXAM OF KNEE 3: CPT | Mod: LT

## 2023-09-26 PROBLEM — T86.19 URETERAL STRICTURE OF KIDNEY TRANSPLANT: Status: ACTIVE | Noted: 2020-04-02

## 2023-09-26 PROBLEM — R33.8 ACUTE URINARY RETENTION: Status: RESOLVED | Noted: 2021-06-16 | Resolved: 2022-04-05

## 2023-09-26 RX ORDER — SULFAMETHOXAZOLE AND TRIMETHOPRIM 400; 80 MG/1; MG/1
400-80 TABLET ORAL
Qty: 90 | Refills: 3 | Status: ACTIVE | COMMUNITY
Start: 2018-07-19 | End: 1900-01-01

## 2023-10-01 PROBLEM — Z92.3 HISTORY OF RADIATION THERAPY: Status: RESOLVED | Noted: 2022-04-20 | Resolved: 2023-10-01

## 2023-10-02 ENCOUNTER — APPOINTMENT (OUTPATIENT)
Dept: COLORECTAL SURGERY | Facility: CLINIC | Age: 73
End: 2023-10-02
Payer: MEDICARE

## 2023-10-02 ENCOUNTER — APPOINTMENT (OUTPATIENT)
Dept: UROLOGY | Facility: CLINIC | Age: 73
End: 2023-10-02
Payer: MEDICARE

## 2023-10-02 VITALS
BODY MASS INDEX: 29.78 KG/M2 | DIASTOLIC BLOOD PRESSURE: 71 MMHG | HEART RATE: 84 BPM | WEIGHT: 208 LBS | SYSTOLIC BLOOD PRESSURE: 143 MMHG | HEIGHT: 70 IN | RESPIRATION RATE: 16 BRPM

## 2023-10-02 DIAGNOSIS — R33.8 OTHER RETENTION OF URINE: ICD-10-CM

## 2023-10-02 DIAGNOSIS — K59.09 OTHER CONSTIPATION: ICD-10-CM

## 2023-10-02 DIAGNOSIS — A63.0 ANOGENITAL (VENEREAL) WARTS: ICD-10-CM

## 2023-10-02 DIAGNOSIS — N13.5 OTHER COMPLICATION OF KIDNEY TRANSPLANT: ICD-10-CM

## 2023-10-02 DIAGNOSIS — K52.9 NONINFECTIVE GASTROENTERITIS AND COLITIS, UNSPECIFIED: ICD-10-CM

## 2023-10-02 DIAGNOSIS — T86.19 OTHER COMPLICATION OF KIDNEY TRANSPLANT: ICD-10-CM

## 2023-10-02 PROCEDURE — 99214 OFFICE O/P EST MOD 30 MIN: CPT

## 2023-10-02 PROCEDURE — 46600 DIAGNOSTIC ANOSCOPY SPX: CPT

## 2023-10-02 PROCEDURE — 99212 OFFICE O/P EST SF 10 MIN: CPT | Mod: 25

## 2023-10-02 RX ORDER — NYSTATIN 100000 1/G
100000 POWDER TOPICAL
Qty: 1 | Refills: 2 | Status: ACTIVE | COMMUNITY
Start: 2023-10-02 | End: 1900-01-01

## 2023-10-04 ENCOUNTER — TRANSCRIPTION ENCOUNTER (OUTPATIENT)
Age: 73
End: 2023-10-04

## 2023-10-20 ENCOUNTER — TRANSCRIPTION ENCOUNTER (OUTPATIENT)
Age: 73
End: 2023-10-20

## 2023-10-25 ENCOUNTER — RX RENEWAL (OUTPATIENT)
Age: 73
End: 2023-10-25

## 2023-10-27 ENCOUNTER — APPOINTMENT (OUTPATIENT)
Dept: UROLOGY | Facility: CLINIC | Age: 73
End: 2023-10-27
Payer: MEDICARE

## 2023-10-27 VITALS
SYSTOLIC BLOOD PRESSURE: 125 MMHG | WEIGHT: 205 LBS | DIASTOLIC BLOOD PRESSURE: 66 MMHG | HEIGHT: 70 IN | HEART RATE: 89 BPM | RESPIRATION RATE: 17 BRPM | BODY MASS INDEX: 29.35 KG/M2

## 2023-10-27 DIAGNOSIS — N39.46 MIXED INCONTINENCE: ICD-10-CM

## 2023-10-27 PROCEDURE — 99214 OFFICE O/P EST MOD 30 MIN: CPT

## 2023-11-29 ENCOUNTER — APPOINTMENT (OUTPATIENT)
Dept: ORTHOPEDIC SURGERY | Facility: CLINIC | Age: 73
End: 2023-11-29
Payer: MEDICARE

## 2023-11-29 VITALS — BODY MASS INDEX: 29.35 KG/M2 | HEIGHT: 70 IN | WEIGHT: 205 LBS

## 2023-11-29 PROCEDURE — 99214 OFFICE O/P EST MOD 30 MIN: CPT

## 2023-11-29 PROCEDURE — 73564 X-RAY EXAM KNEE 4 OR MORE: CPT | Mod: 50

## 2023-12-14 ENCOUNTER — NON-APPOINTMENT (OUTPATIENT)
Age: 73
End: 2023-12-14

## 2023-12-14 NOTE — PROVIDER CONTACT NOTE (OTHER) - NAME OF MD/NP/PA/DO NOTIFIED:
Nursing Support:  When: Monday through Friday Dr Radha Godoy

## 2024-01-03 ENCOUNTER — RX RENEWAL (OUTPATIENT)
Age: 74
End: 2024-01-03

## 2024-01-10 ENCOUNTER — OUTPATIENT (OUTPATIENT)
Dept: OUTPATIENT SERVICES | Facility: HOSPITAL | Age: 74
LOS: 1 days | Discharge: HOME | End: 2024-01-10
Payer: MEDICARE

## 2024-01-10 DIAGNOSIS — M25.561 PAIN IN RIGHT KNEE: ICD-10-CM

## 2024-01-10 DIAGNOSIS — Z01.818 ENCOUNTER FOR OTHER PREPROCEDURAL EXAMINATION: ICD-10-CM

## 2024-01-10 DIAGNOSIS — Z98.890 OTHER SPECIFIED POSTPROCEDURAL STATES: Chronic | ICD-10-CM

## 2024-01-10 DIAGNOSIS — E11.9 TYPE 2 DIABETES MELLITUS WITHOUT COMPLICATIONS: ICD-10-CM

## 2024-01-10 DIAGNOSIS — I77.0 ARTERIOVENOUS FISTULA, ACQUIRED: Chronic | ICD-10-CM

## 2024-01-10 DIAGNOSIS — Z96.652 PRESENCE OF LEFT ARTIFICIAL KNEE JOINT: Chronic | ICD-10-CM

## 2024-01-10 DIAGNOSIS — Z90.5 ACQUIRED ABSENCE OF KIDNEY: Chronic | ICD-10-CM

## 2024-01-10 DIAGNOSIS — M17.11 UNILATERAL PRIMARY OSTEOARTHRITIS, RIGHT KNEE: ICD-10-CM

## 2024-01-10 DIAGNOSIS — A63.0 ANOGENITAL (VENEREAL) WARTS: Chronic | ICD-10-CM

## 2024-01-10 DIAGNOSIS — N18.9 CHRONIC KIDNEY DISEASE, UNSPECIFIED: ICD-10-CM

## 2024-01-10 DIAGNOSIS — Z90.79 ACQUIRED ABSENCE OF OTHER GENITAL ORGAN(S): Chronic | ICD-10-CM

## 2024-01-10 DIAGNOSIS — I10 ESSENTIAL (PRIMARY) HYPERTENSION: ICD-10-CM

## 2024-01-10 DIAGNOSIS — Z94.0 KIDNEY TRANSPLANT STATUS: Chronic | ICD-10-CM

## 2024-01-10 LAB
A1C WITH ESTIMATED AVERAGE GLUCOSE RESULT: 6.4 % — HIGH (ref 4–5.6)
A1C WITH ESTIMATED AVERAGE GLUCOSE RESULT: 6.4 % — HIGH (ref 4–5.6)
ALBUMIN SERPL ELPH-MCNC: 3.4 G/DL — SIGNIFICANT CHANGE UP (ref 3.3–5)
ALBUMIN SERPL ELPH-MCNC: 3.4 G/DL — SIGNIFICANT CHANGE UP (ref 3.3–5)
ALP SERPL-CCNC: 88 U/L — SIGNIFICANT CHANGE UP (ref 40–120)
ALP SERPL-CCNC: 88 U/L — SIGNIFICANT CHANGE UP (ref 40–120)
ALT FLD-CCNC: 19 U/L — SIGNIFICANT CHANGE UP (ref 12–78)
ALT FLD-CCNC: 19 U/L — SIGNIFICANT CHANGE UP (ref 12–78)
ANION GAP SERPL CALC-SCNC: 4 MMOL/L — LOW (ref 5–17)
ANION GAP SERPL CALC-SCNC: 4 MMOL/L — LOW (ref 5–17)
APTT BLD: 34.1 SEC — SIGNIFICANT CHANGE UP (ref 24.5–35.6)
APTT BLD: 34.1 SEC — SIGNIFICANT CHANGE UP (ref 24.5–35.6)
AST SERPL-CCNC: 22 U/L — SIGNIFICANT CHANGE UP (ref 15–37)
AST SERPL-CCNC: 22 U/L — SIGNIFICANT CHANGE UP (ref 15–37)
BASOPHILS # BLD AUTO: 0.03 K/UL — SIGNIFICANT CHANGE UP (ref 0–0.2)
BASOPHILS # BLD AUTO: 0.03 K/UL — SIGNIFICANT CHANGE UP (ref 0–0.2)
BASOPHILS NFR BLD AUTO: 0.5 % — SIGNIFICANT CHANGE UP (ref 0–2)
BASOPHILS NFR BLD AUTO: 0.5 % — SIGNIFICANT CHANGE UP (ref 0–2)
BILIRUB SERPL-MCNC: 0.4 MG/DL — SIGNIFICANT CHANGE UP (ref 0.2–1.2)
BILIRUB SERPL-MCNC: 0.4 MG/DL — SIGNIFICANT CHANGE UP (ref 0.2–1.2)
BLD GP AB SCN SERPL QL: SIGNIFICANT CHANGE UP
BLD GP AB SCN SERPL QL: SIGNIFICANT CHANGE UP
BUN SERPL-MCNC: 22 MG/DL — SIGNIFICANT CHANGE UP (ref 7–23)
BUN SERPL-MCNC: 22 MG/DL — SIGNIFICANT CHANGE UP (ref 7–23)
CALCIUM SERPL-MCNC: 9.7 MG/DL — SIGNIFICANT CHANGE UP (ref 8.5–10.1)
CALCIUM SERPL-MCNC: 9.7 MG/DL — SIGNIFICANT CHANGE UP (ref 8.5–10.1)
CHLORIDE SERPL-SCNC: 109 MMOL/L — HIGH (ref 96–108)
CHLORIDE SERPL-SCNC: 109 MMOL/L — HIGH (ref 96–108)
CO2 SERPL-SCNC: 26 MMOL/L — SIGNIFICANT CHANGE UP (ref 22–31)
CO2 SERPL-SCNC: 26 MMOL/L — SIGNIFICANT CHANGE UP (ref 22–31)
CREAT SERPL-MCNC: 2.16 MG/DL — HIGH (ref 0.5–1.3)
CREAT SERPL-MCNC: 2.16 MG/DL — HIGH (ref 0.5–1.3)
EGFR: 31 ML/MIN/1.73M2 — LOW
EGFR: 31 ML/MIN/1.73M2 — LOW
EOSINOPHIL # BLD AUTO: 0.03 K/UL — SIGNIFICANT CHANGE UP (ref 0–0.5)
EOSINOPHIL # BLD AUTO: 0.03 K/UL — SIGNIFICANT CHANGE UP (ref 0–0.5)
EOSINOPHIL NFR BLD AUTO: 0.5 % — SIGNIFICANT CHANGE UP (ref 0–6)
EOSINOPHIL NFR BLD AUTO: 0.5 % — SIGNIFICANT CHANGE UP (ref 0–6)
ESTIMATED AVERAGE GLUCOSE: 137 MG/DL — HIGH (ref 68–114)
ESTIMATED AVERAGE GLUCOSE: 137 MG/DL — HIGH (ref 68–114)
GLUCOSE SERPL-MCNC: 186 MG/DL — HIGH (ref 70–99)
GLUCOSE SERPL-MCNC: 186 MG/DL — HIGH (ref 70–99)
HCT VFR BLD CALC: 36.6 % — LOW (ref 39–50)
HCT VFR BLD CALC: 36.6 % — LOW (ref 39–50)
HGB BLD-MCNC: 11.6 G/DL — LOW (ref 13–17)
HGB BLD-MCNC: 11.6 G/DL — LOW (ref 13–17)
IMM GRANULOCYTES NFR BLD AUTO: 0.2 % — SIGNIFICANT CHANGE UP (ref 0–0.9)
IMM GRANULOCYTES NFR BLD AUTO: 0.2 % — SIGNIFICANT CHANGE UP (ref 0–0.9)
INR BLD: 0.86 RATIO — SIGNIFICANT CHANGE UP (ref 0.85–1.18)
INR BLD: 0.86 RATIO — SIGNIFICANT CHANGE UP (ref 0.85–1.18)
LYMPHOCYTES # BLD AUTO: 2.1 K/UL — SIGNIFICANT CHANGE UP (ref 1–3.3)
LYMPHOCYTES # BLD AUTO: 2.1 K/UL — SIGNIFICANT CHANGE UP (ref 1–3.3)
LYMPHOCYTES # BLD AUTO: 33.7 % — SIGNIFICANT CHANGE UP (ref 13–44)
LYMPHOCYTES # BLD AUTO: 33.7 % — SIGNIFICANT CHANGE UP (ref 13–44)
MCHC RBC-ENTMCNC: 30.3 PG — SIGNIFICANT CHANGE UP (ref 27–34)
MCHC RBC-ENTMCNC: 30.3 PG — SIGNIFICANT CHANGE UP (ref 27–34)
MCHC RBC-ENTMCNC: 31.7 G/DL — LOW (ref 32–36)
MCHC RBC-ENTMCNC: 31.7 G/DL — LOW (ref 32–36)
MCV RBC AUTO: 95.6 FL — SIGNIFICANT CHANGE UP (ref 80–100)
MCV RBC AUTO: 95.6 FL — SIGNIFICANT CHANGE UP (ref 80–100)
MONOCYTES # BLD AUTO: 0.33 K/UL — SIGNIFICANT CHANGE UP (ref 0–0.9)
MONOCYTES # BLD AUTO: 0.33 K/UL — SIGNIFICANT CHANGE UP (ref 0–0.9)
MONOCYTES NFR BLD AUTO: 5.3 % — SIGNIFICANT CHANGE UP (ref 2–14)
MONOCYTES NFR BLD AUTO: 5.3 % — SIGNIFICANT CHANGE UP (ref 2–14)
MRSA PCR RESULT.: SIGNIFICANT CHANGE UP
MRSA PCR RESULT.: SIGNIFICANT CHANGE UP
NEUTROPHILS # BLD AUTO: 3.74 K/UL — SIGNIFICANT CHANGE UP (ref 1.8–7.4)
NEUTROPHILS # BLD AUTO: 3.74 K/UL — SIGNIFICANT CHANGE UP (ref 1.8–7.4)
NEUTROPHILS NFR BLD AUTO: 59.8 % — SIGNIFICANT CHANGE UP (ref 43–77)
NEUTROPHILS NFR BLD AUTO: 59.8 % — SIGNIFICANT CHANGE UP (ref 43–77)
NRBC # BLD: 0 /100 WBCS — SIGNIFICANT CHANGE UP (ref 0–0)
NRBC # BLD: 0 /100 WBCS — SIGNIFICANT CHANGE UP (ref 0–0)
PLATELET # BLD AUTO: 162 K/UL — SIGNIFICANT CHANGE UP (ref 150–400)
PLATELET # BLD AUTO: 162 K/UL — SIGNIFICANT CHANGE UP (ref 150–400)
POTASSIUM SERPL-MCNC: 4.3 MMOL/L — SIGNIFICANT CHANGE UP (ref 3.5–5.3)
POTASSIUM SERPL-MCNC: 4.3 MMOL/L — SIGNIFICANT CHANGE UP (ref 3.5–5.3)
POTASSIUM SERPL-SCNC: 4.3 MMOL/L — SIGNIFICANT CHANGE UP (ref 3.5–5.3)
POTASSIUM SERPL-SCNC: 4.3 MMOL/L — SIGNIFICANT CHANGE UP (ref 3.5–5.3)
PROT SERPL-MCNC: 7.2 GM/DL — SIGNIFICANT CHANGE UP (ref 6–8.3)
PROT SERPL-MCNC: 7.2 GM/DL — SIGNIFICANT CHANGE UP (ref 6–8.3)
PROTHROM AB SERPL-ACNC: 10.3 SEC — SIGNIFICANT CHANGE UP (ref 9.5–13)
PROTHROM AB SERPL-ACNC: 10.3 SEC — SIGNIFICANT CHANGE UP (ref 9.5–13)
RBC # BLD: 3.83 M/UL — LOW (ref 4.2–5.8)
RBC # BLD: 3.83 M/UL — LOW (ref 4.2–5.8)
RBC # FLD: 14.2 % — SIGNIFICANT CHANGE UP (ref 10.3–14.5)
RBC # FLD: 14.2 % — SIGNIFICANT CHANGE UP (ref 10.3–14.5)
S AUREUS DNA NOSE QL NAA+PROBE: SIGNIFICANT CHANGE UP
S AUREUS DNA NOSE QL NAA+PROBE: SIGNIFICANT CHANGE UP
SODIUM SERPL-SCNC: 139 MMOL/L — SIGNIFICANT CHANGE UP (ref 135–145)
SODIUM SERPL-SCNC: 139 MMOL/L — SIGNIFICANT CHANGE UP (ref 135–145)
WBC # BLD: 6.24 K/UL — SIGNIFICANT CHANGE UP (ref 3.8–10.5)
WBC # BLD: 6.24 K/UL — SIGNIFICANT CHANGE UP (ref 3.8–10.5)
WBC # FLD AUTO: 6.24 K/UL — SIGNIFICANT CHANGE UP (ref 3.8–10.5)
WBC # FLD AUTO: 6.24 K/UL — SIGNIFICANT CHANGE UP (ref 3.8–10.5)

## 2024-01-10 PROCEDURE — 93010 ELECTROCARDIOGRAM REPORT: CPT

## 2024-01-10 RX ORDER — ALLOPURINOL 300 MG
1 TABLET ORAL
Refills: 0 | DISCHARGE

## 2024-01-10 RX ORDER — TACROLIMUS 5 MG/1
1 CAPSULE ORAL
Refills: 0 | DISCHARGE

## 2024-01-10 RX ORDER — METOPROLOL TARTRATE 50 MG
1 TABLET ORAL
Refills: 0 | DISCHARGE

## 2024-01-10 RX ORDER — TAMSULOSIN HYDROCHLORIDE 0.4 MG/1
2 CAPSULE ORAL
Refills: 0 | DISCHARGE

## 2024-01-10 RX ORDER — CITRIC ACID/SODIUM CITRATE 300-500 MG
15 SOLUTION, ORAL ORAL
Refills: 0 | DISCHARGE

## 2024-01-10 RX ORDER — MAGNESIUM OXIDE 400 MG ORAL TABLET 241.3 MG
1 TABLET ORAL
Refills: 0 | DISCHARGE

## 2024-01-10 RX ORDER — NIFEDIPINE 30 MG
1 TABLET, EXTENDED RELEASE 24 HR ORAL
Refills: 0 | DISCHARGE

## 2024-01-10 RX ORDER — GLIMEPIRIDE 1 MG
1 TABLET ORAL
Refills: 0 | DISCHARGE

## 2024-01-10 RX ORDER — CALCIUM CARBONATE 500(1250)
1 TABLET ORAL
Refills: 0 | DISCHARGE

## 2024-01-10 RX ORDER — ATENOLOL 25 MG/1
1 TABLET ORAL
Refills: 0 | DISCHARGE

## 2024-01-10 NOTE — H&P PST ADULT - HISTORY OF PRESENT ILLNESS
72 y/o male presents to [facility]. Patient has a past medical history of HTN, T2DM,Hep C, ESRD and kidney and prostate cancer s/p chemo, kidney transplant recipient  in 2018 and Left TKR  with Dr. Campuzano in may 2023.  Patient reports Right knee pain 2/2 Right knee arthritis and has a planned Right total Knee replacement with Dr. Campuzano on 1/30/2024.   74 y/o male presents to [facility]. Patient has a past medical history of HTN, T2DM,Hep C, ESRD and kidney and prostate cancer s/p chemo, kidney transplant recipient  in 2018 and Left TKR  with Dr. Campuzano in may 2023.  Patient reports Right knee pain 2/2 Right knee arthritis and has a planned Right total Knee replacement with Dr. Campuzano on 1/30/2024.   74 y/o male presents to [facility]. Patient has a past medical history of HTN, T2DM,Hep C, ESRD and kidney and prostate cancer s/p chemo, kidney transplant recipient  in 2018 and Left TKR  with Dr. Campuzano in may 2023.  Patient reports Right knee pain 2/2 Right knee arthritis and has a planned Right total Knee replacement with Dr. Campuzano on 1/30/2024.   74 y/o male presents to [facility]. Patient has a past medical history of HTN, T2DM,Hep C, ESRD and kidney and prostate cancer s/p chemo, kidney transplant recipient  in 2018 and Left TKR  with Dr. Campuzano in may 2023.  Patient reports Right knee pain 2/2 Right knee arthritis and has a planned Right total Knee replacement with Dr. Campuzano on 1/30/2024.

## 2024-01-10 NOTE — OCCUPATIONAL THERAPY INITIAL EVALUATION ADULT - ADDITIONAL COMMENTS
Pt reports he lives with spouse in a private house with 4 steps with bilateral close rails to enter & 10 steps with right rail up to navigate inside. Pt has a tub/shower combo, grab bars, fixed shower head & comfort height toilet. Pt is independent with ADLs and mobility. Pt reports PMH of left TKA in 5/2022 & owns a rolling walker, 3:1 commode and cane from that surgery. Pt has increased pain with activity. Pt has had recent PT, no falls and no leg buckling. Pt wears glasses, is right handed, drives & works as a .

## 2024-01-10 NOTE — H&P PST ADULT - NSICDXPASTSURGICALHX_GEN_ALL_CORE_FT
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 s/p Reversal    Kidney transplant recipient 2018  @ [facility] :  +  Hep C Donor    S/P anal fissurectomy and chemical denervation  with biopsy and fulguration of anal lesions, 2021    S/p nephrectomy left 9/15/2015   + Cancer    S/p nephrectomy right 12/10/2015   benign    S/P right knee arthroscopy     S/P total knee replacement, left     S/P TURP (transurethral resection of prostate) and Transplant Kidney Stent Replacemernt, 21     PAST SURGICAL HISTORY:  Anal condyloma S/P excision; 22    AV fistula 2013/ left forearm    H/O colonoscopy     H/O ileostomy '    for 3 months. s/p Reversal    Kidney transplant recipient 2018  @ [facility] :  +  Hep C Donor    S/P anal fissurectomy and chemical denervation  with biopsy and fulguration of anal lesions, 2021    S/p nephrectomy left 9/15/2015   + Cancer    S/p nephrectomy right 12/10/2015   benign    S/P right knee arthroscopy     S/P total knee replacement, left     S/P TURP (transurethral resection of prostate) and Transplant Kidney Stent Replacemernt, 21     PAST SURGICAL HISTORY:  Anal condyloma S/P excision; 22    AV fistula 2013/ left forearm    H/O colonoscopy     H/O ileostomy '    for 3 months. s/p Reversal    Kidney transplant recipient 2018  @ [facility] :  +  Hep C Donor    S/P anal fissurectomy and chemical denervation  with biopsy and fulguration of anal lesions, 2021    S/p nephrectomy left 9/15/2015   + Cancer    S/p nephrectomy right 12/10/2015   benign    S/P right knee arthroscopy     S/P total knee replacement, left     S/P TURP (transurethral resection of prostate) and Transplant Kidney Stent Replacemernt, 21     PAST SURGICAL HISTORY:  Anal condyloma S/P excision; 22    AV fistula 2013/ left forearm    H/O colonoscopy     H/O ileostomy '    for 3 months. s/p Reversal    Kidney transplant recipient 2018  @ [facility] :  +  Hep C Donor    S/P anal fissurectomy and chemical denervation  with biopsy and fulguration of anal lesions, 2021    S/p nephrectomy left 9/15/2015   + Cancer    S/p nephrectomy right 12/10/2015   benign    S/P right knee arthroscopy     S/P total knee replacement, left     S/P TURP (transurethral resection of prostate) and Transplant Kidney Stent Replacemernt, 21

## 2024-01-10 NOTE — H&P PST ADULT - OTHER CARE PROVIDERS
Organ transplant/ nephrologist Dr Bhakta  [phone]  oncology Dr Han [phone] Organ transplant/ nephrologist Dr Bhakta  [phone]  oncology Dr Han [phone] Organ transplant/ nephrologist Dr Bhakta  [phone]  oncology Dr Han [phone] Organ transplant/ nephrologist Dr Bhakta  [phone]  oncology Dr Han [phone]

## 2024-01-10 NOTE — H&P PST ADULT - ASSESSMENT
Right knee Arthritis for planned Right total knee replacement  CAPRINI SCORE [CLOT]    AGE RELATED RISK FACTORS                                                       MOBILITY RELATED FACTORS  [ ] Age 41-60 years                                            (1 Point)                  [ ] Bed rest                                                        (1 Point)  [x ] Age: 61-74 years                                           (2 Points)                 [ ] Plaster cast                                                   (2 Points)  [ ] Age= 75 years                                              (3 Points)                 [ ] Bed bound for more than 72 hours                 (2 Points)    DISEASE RELATED RISK FACTORS                                               GENDER SPECIFIC FACTORS  [ ] Edema in the lower extremities                       (1 Point)                  [ ] Pregnancy                                                     (1 Point)  [ ] Varicose veins                                               (1 Point)                  [ ] Post-partum < 6 weeks                                   (1 Point)             [x ] BMI > 25 Kg/m2                                            (1 Point)                  [ ] Hormonal therapy  or oral contraception          (1 Point)                 [ ] Sepsis (in the previous month)                        (1 Point)                  [ ] History of pregnancy complications                 (1 point)  [ ] Pneumonia or serious lung disease                                               [ ] Unexplained or recurrent                     (1 Point)           (in the previous month)                               (1 Point)  [ ] Abnormal pulmonary function test                     (1 Point)                 SURGERY RELATED RISK FACTORS  [ ] Acute myocardial infarction                              (1 Point)                 [ ]  Section                                             (1 Point)  [ ] Congestive heart failure (in the previous month)  (1 Point)               [ ] Minor surgery                                                  (1 Point)   [ ] Inflammatory bowel disease                             (1 Point)                 [ ] Arthroscopic surgery                                        (2 Points)  [ ] Central venous access                                      (2 Points)                [ ] General surgery lasting more than 45 minutes   (2 Points)       [ ] Stroke (in the previous month)                          (5 Points)               [ x] Elective arthroplasty                                         (5 Points)                                                                                                                                               HEMATOLOGY RELATED FACTORS                                                 TRAUMA RELATED RISK FACTORS  [ ] Prior episodes of VTE                                     (3 Points)                [ ] Fracture of the hip, pelvis, or leg                       (5 Points)  [ ] Positive family history for VTE                         (3 Points)                 [ ] Acute spinal cord injury (in the previous month)  (5 Points)  [ ] Prothrombin 15412 A                                     (3 Points)                 [ ] Paralysis  (less than 1 month)                             (5 Points)  [ ] Factor V Leiden                                             (3 Points)                  [ ] Multiple Trauma within 1 month                        (5 Points)  [ ] Lupus anticoagulants                                     (3 Points)                                                           [ ] Anticardiolipin antibodies                               (3 Points)                                                       [ ] High homocysteine in the blood                      (3 Points)                                             [ ] Other congenital or acquired thrombophilia      (3 Points)                                                [ ] Heparin induced thrombocytopenia                  (3 Points)                                          Total Score [     8     ]    Caprini Score 0 - 2:  Low Risk, No VTE Prophylaxis required for most patients, encourage ambulation  Caprini Score 3 - 6:  At Risk, pharmacologic VTE prophylaxis is indicated for most patients (in the absence of a contraindication)  Caprini Score Greater than or = 7:  High Risk, pharmacologic VTE prophylaxis is indicated for most patients (in the absence of a contraindication) Right knee Arthritis for planned Right total knee replacement  CAPRINI SCORE [CLOT]    AGE RELATED RISK FACTORS                                                       MOBILITY RELATED FACTORS  [ ] Age 41-60 years                                            (1 Point)                  [ ] Bed rest                                                        (1 Point)  [x ] Age: 61-74 years                                           (2 Points)                 [ ] Plaster cast                                                   (2 Points)  [ ] Age= 75 years                                              (3 Points)                 [ ] Bed bound for more than 72 hours                 (2 Points)    DISEASE RELATED RISK FACTORS                                               GENDER SPECIFIC FACTORS  [ ] Edema in the lower extremities                       (1 Point)                  [ ] Pregnancy                                                     (1 Point)  [ ] Varicose veins                                               (1 Point)                  [ ] Post-partum < 6 weeks                                   (1 Point)             [x ] BMI > 25 Kg/m2                                            (1 Point)                  [ ] Hormonal therapy  or oral contraception          (1 Point)                 [ ] Sepsis (in the previous month)                        (1 Point)                  [ ] History of pregnancy complications                 (1 point)  [ ] Pneumonia or serious lung disease                                               [ ] Unexplained or recurrent                     (1 Point)           (in the previous month)                               (1 Point)  [ ] Abnormal pulmonary function test                     (1 Point)                 SURGERY RELATED RISK FACTORS  [ ] Acute myocardial infarction                              (1 Point)                 [ ]  Section                                             (1 Point)  [ ] Congestive heart failure (in the previous month)  (1 Point)               [ ] Minor surgery                                                  (1 Point)   [ ] Inflammatory bowel disease                             (1 Point)                 [ ] Arthroscopic surgery                                        (2 Points)  [ ] Central venous access                                      (2 Points)                [ ] General surgery lasting more than 45 minutes   (2 Points)       [ ] Stroke (in the previous month)                          (5 Points)               [ x] Elective arthroplasty                                         (5 Points)                                                                                                                                               HEMATOLOGY RELATED FACTORS                                                 TRAUMA RELATED RISK FACTORS  [ ] Prior episodes of VTE                                     (3 Points)                [ ] Fracture of the hip, pelvis, or leg                       (5 Points)  [ ] Positive family history for VTE                         (3 Points)                 [ ] Acute spinal cord injury (in the previous month)  (5 Points)  [ ] Prothrombin 10375 A                                     (3 Points)                 [ ] Paralysis  (less than 1 month)                             (5 Points)  [ ] Factor V Leiden                                             (3 Points)                  [ ] Multiple Trauma within 1 month                        (5 Points)  [ ] Lupus anticoagulants                                     (3 Points)                                                           [ ] Anticardiolipin antibodies                               (3 Points)                                                       [ ] High homocysteine in the blood                      (3 Points)                                             [ ] Other congenital or acquired thrombophilia      (3 Points)                                                [ ] Heparin induced thrombocytopenia                  (3 Points)                                          Total Score [     8     ]    Caprini Score 0 - 2:  Low Risk, No VTE Prophylaxis required for most patients, encourage ambulation  Caprini Score 3 - 6:  At Risk, pharmacologic VTE prophylaxis is indicated for most patients (in the absence of a contraindication)  Caprini Score Greater than or = 7:  High Risk, pharmacologic VTE prophylaxis is indicated for most patients (in the absence of a contraindication) Right knee Arthritis for planned Right total knee replacement  CAPRINI SCORE [CLOT]    AGE RELATED RISK FACTORS                                                       MOBILITY RELATED FACTORS  [ ] Age 41-60 years                                            (1 Point)                  [ ] Bed rest                                                        (1 Point)  [x ] Age: 61-74 years                                           (2 Points)                 [ ] Plaster cast                                                   (2 Points)  [ ] Age= 75 years                                              (3 Points)                 [ ] Bed bound for more than 72 hours                 (2 Points)    DISEASE RELATED RISK FACTORS                                               GENDER SPECIFIC FACTORS  [ ] Edema in the lower extremities                       (1 Point)                  [ ] Pregnancy                                                     (1 Point)  [ ] Varicose veins                                               (1 Point)                  [ ] Post-partum < 6 weeks                                   (1 Point)             [x ] BMI > 25 Kg/m2                                            (1 Point)                  [ ] Hormonal therapy  or oral contraception          (1 Point)                 [ ] Sepsis (in the previous month)                        (1 Point)                  [ ] History of pregnancy complications                 (1 point)  [ ] Pneumonia or serious lung disease                                               [ ] Unexplained or recurrent                     (1 Point)           (in the previous month)                               (1 Point)  [ ] Abnormal pulmonary function test                     (1 Point)                 SURGERY RELATED RISK FACTORS  [ ] Acute myocardial infarction                              (1 Point)                 [ ]  Section                                             (1 Point)  [ ] Congestive heart failure (in the previous month)  (1 Point)               [ ] Minor surgery                                                  (1 Point)   [ ] Inflammatory bowel disease                             (1 Point)                 [ ] Arthroscopic surgery                                        (2 Points)  [ ] Central venous access                                      (2 Points)                [ ] General surgery lasting more than 45 minutes   (2 Points)       [ ] Stroke (in the previous month)                          (5 Points)               [ x] Elective arthroplasty                                         (5 Points)                                                                                                                                               HEMATOLOGY RELATED FACTORS                                                 TRAUMA RELATED RISK FACTORS  [ ] Prior episodes of VTE                                     (3 Points)                [ ] Fracture of the hip, pelvis, or leg                       (5 Points)  [ ] Positive family history for VTE                         (3 Points)                 [ ] Acute spinal cord injury (in the previous month)  (5 Points)  [ ] Prothrombin 32081 A                                     (3 Points)                 [ ] Paralysis  (less than 1 month)                             (5 Points)  [ ] Factor V Leiden                                             (3 Points)                  [ ] Multiple Trauma within 1 month                        (5 Points)  [ ] Lupus anticoagulants                                     (3 Points)                                                           [ ] Anticardiolipin antibodies                               (3 Points)                                                       [ ] High homocysteine in the blood                      (3 Points)                                             [ ] Other congenital or acquired thrombophilia      (3 Points)                                                [ ] Heparin induced thrombocytopenia                  (3 Points)                                          Total Score [     8     ]    Caprini Score 0 - 2:  Low Risk, No VTE Prophylaxis required for most patients, encourage ambulation  Caprini Score 3 - 6:  At Risk, pharmacologic VTE prophylaxis is indicated for most patients (in the absence of a contraindication)  Caprini Score Greater than or = 7:  High Risk, pharmacologic VTE prophylaxis is indicated for most patients (in the absence of a contraindication) Right knee Arthritis for planned Right total knee replacement  CAPRINI SCORE [CLOT]    AGE RELATED RISK FACTORS                                                       MOBILITY RELATED FACTORS  [ ] Age 41-60 years                                            (1 Point)                  [ ] Bed rest                                                        (1 Point)  [x ] Age: 61-74 years                                           (2 Points)                 [ ] Plaster cast                                                   (2 Points)  [ ] Age= 75 years                                              (3 Points)                 [ ] Bed bound for more than 72 hours                 (2 Points)    DISEASE RELATED RISK FACTORS                                               GENDER SPECIFIC FACTORS  [ ] Edema in the lower extremities                       (1 Point)                  [ ] Pregnancy                                                     (1 Point)  [ ] Varicose veins                                               (1 Point)                  [ ] Post-partum < 6 weeks                                   (1 Point)             [x ] BMI > 25 Kg/m2                                            (1 Point)                  [ ] Hormonal therapy  or oral contraception          (1 Point)                 [ ] Sepsis (in the previous month)                        (1 Point)                  [ ] History of pregnancy complications                 (1 point)  [ ] Pneumonia or serious lung disease                                               [ ] Unexplained or recurrent                     (1 Point)           (in the previous month)                               (1 Point)  [ ] Abnormal pulmonary function test                     (1 Point)                 SURGERY RELATED RISK FACTORS  [ ] Acute myocardial infarction                              (1 Point)                 [ ]  Section                                             (1 Point)  [ ] Congestive heart failure (in the previous month)  (1 Point)               [ ] Minor surgery                                                  (1 Point)   [ ] Inflammatory bowel disease                             (1 Point)                 [ ] Arthroscopic surgery                                        (2 Points)  [ ] Central venous access                                      (2 Points)                [ ] General surgery lasting more than 45 minutes   (2 Points)       [ ] Stroke (in the previous month)                          (5 Points)               [ x] Elective arthroplasty                                         (5 Points)                                                                                                                                               HEMATOLOGY RELATED FACTORS                                                 TRAUMA RELATED RISK FACTORS  [ ] Prior episodes of VTE                                     (3 Points)                [ ] Fracture of the hip, pelvis, or leg                       (5 Points)  [ ] Positive family history for VTE                         (3 Points)                 [ ] Acute spinal cord injury (in the previous month)  (5 Points)  [ ] Prothrombin 57118 A                                     (3 Points)                 [ ] Paralysis  (less than 1 month)                             (5 Points)  [ ] Factor V Leiden                                             (3 Points)                  [ ] Multiple Trauma within 1 month                        (5 Points)  [ ] Lupus anticoagulants                                     (3 Points)                                                           [ ] Anticardiolipin antibodies                               (3 Points)                                                       [ ] High homocysteine in the blood                      (3 Points)                                             [ ] Other congenital or acquired thrombophilia      (3 Points)                                                [ ] Heparin induced thrombocytopenia                  (3 Points)                                          Total Score [     8     ]    Caprini Score 0 - 2:  Low Risk, No VTE Prophylaxis required for most patients, encourage ambulation  Caprini Score 3 - 6:  At Risk, pharmacologic VTE prophylaxis is indicated for most patients (in the absence of a contraindication)  Caprini Score Greater than or = 7:  High Risk, pharmacologic VTE prophylaxis is indicated for most patients (in the absence of a contraindication)

## 2024-01-10 NOTE — H&P PST ADULT - GASTROINTESTINAL COMMENTS
multiple scars history of ileostomy reversal s/p ileostomy and reversal s/p ileostomy and reversal 2017

## 2024-01-10 NOTE — H&P PST ADULT - ANESTHESIA, PREVIOUS REACTION, PROFILE
Mallampati: II  Denies loose teeth, Partial Upper and lower dentures/none Mallampati: II  loose Denture teeth, Partial Upper and lower dentures/none

## 2024-01-10 NOTE — H&P PST ADULT - PRIMARY CARE PROVIDER
Dr De Luna [facility]  Dr De Luna [facility]  Dr De Luna [facility]  Dr De Luna [facility]

## 2024-01-10 NOTE — H&P PST ADULT - FUNCTIONAL STATUS
physical therapy 2x/week, climbs a flight of stairs several times a day at home  Denies chest pain, shortness of breath, palpitations, weakness, headaches or changes in vision/4-10 METS climbs a flight of stairs several times a day at home  Denies chest pain, shortness of breath, palpitations, weakness, headaches or changes in vision/4-10 METS

## 2024-01-10 NOTE — H&P PST ADULT - PROBLEM SELECTOR PLAN 1
labs - cbc, pt/ptt, cmp, t&s, nose cx, ekg, Vitamin d, hemoglobin a1c     M/C required and cardiac   preop 3 day hibiclens instruction reviewed and given. instructed on if nose cx positive use mupirocin 5 days and checklist given   take routine meds DOS with sips of water. avoid NSAID and OTC supplements. verbalized understanding   information on proper nutrition, increase protein and better food choices provided in packet   ensure clear   anesthesiologist to review pst labs, ekg, medical clearances and optimization for surgery labs - cbc, pt/ptt, cmp, t&s, nose cx, ekg, Vitamin d, hemoglobin a1c     M/C, nephrologist, hematologist clearances needed  preop 3 day hibiclens instruction reviewed and given. instructed on if nose cx positive use mupirocin 5 days and checklist given   take routine meds DOS with sips of water. avoid NSAID and OTC supplements. verbalized understanding   information on proper nutrition, increase protein and better food choices provided in packet   ensure clear   anesthesiologist to review pst labs, ekg, medical clearances and optimization for surgery

## 2024-01-10 NOTE — OCCUPATIONAL THERAPY INITIAL EVALUATION ADULT - PERTINENT HX OF CURRENT PROBLEM, REHAB EVAL
Pain and OA right knee. Pt has PMH of left TKA. Pt is scheduled for a right TKA with Dr. Campuzano at [facility] on 1/30/24. Pain and OA right knee. Pt has PMH of left TKA. Pt is scheduled for a right TKA with Dr. Campuzano at [facility] on 1/30/24.

## 2024-01-11 LAB
VIT D25+D1,25 OH+D1,25 PNL SERPL-MCNC: 26.2 PG/ML — SIGNIFICANT CHANGE UP (ref 19.9–79.3)
VIT D25+D1,25 OH+D1,25 PNL SERPL-MCNC: 26.2 PG/ML — SIGNIFICANT CHANGE UP (ref 19.9–79.3)

## 2024-01-29 ENCOUNTER — TRANSCRIPTION ENCOUNTER (OUTPATIENT)
Age: 74
End: 2024-01-29

## 2024-01-30 ENCOUNTER — APPOINTMENT (OUTPATIENT)
Dept: ORTHOPEDIC SURGERY | Facility: HOSPITAL | Age: 74
End: 2024-01-30

## 2024-02-01 ENCOUNTER — APPOINTMENT (OUTPATIENT)
Dept: NEPHROLOGY | Facility: CLINIC | Age: 74
End: 2024-02-01
Payer: MEDICARE

## 2024-02-01 VITALS
HEIGHT: 70 IN | RESPIRATION RATE: 17 BRPM | TEMPERATURE: 96.3 F | BODY MASS INDEX: 30.64 KG/M2 | OXYGEN SATURATION: 96 % | SYSTOLIC BLOOD PRESSURE: 176 MMHG | WEIGHT: 214 LBS | HEART RATE: 85 BPM | DIASTOLIC BLOOD PRESSURE: 69 MMHG

## 2024-02-01 PROCEDURE — 99214 OFFICE O/P EST MOD 30 MIN: CPT

## 2024-02-01 RX ORDER — LINEZOLID 600 MG/1
600 TABLET, FILM COATED ORAL
Qty: 6 | Refills: 0 | Status: DISCONTINUED | COMMUNITY
Start: 2023-08-15 | End: 2024-02-01

## 2024-02-01 RX ORDER — TAMSULOSIN HYDROCHLORIDE 0.4 MG/1
0.4 CAPSULE ORAL
Qty: 180 | Refills: 3 | Status: DISCONTINUED | COMMUNITY
Start: 2020-06-10 | End: 2024-02-01

## 2024-02-01 RX ORDER — NIFEDIPINE 60 MG/1
60 TABLET, EXTENDED RELEASE ORAL
Qty: 30 | Refills: 3 | Status: ACTIVE | COMMUNITY
Start: 2019-01-15 | End: 1900-01-01

## 2024-02-01 NOTE — HISTORY OF PRESENT ILLNESS
[80: Normal activity with effort; some signs or symptoms of disease.] : 80: Normal activity with effort; some signs or symptoms of disease.  [FreeTextEntry1] : Tube exchanged October 29th.  Pt diagnosed with Covid, wife was diagnosed with Covid on Jan 28th 2021.  On 29th pt was tested in an urgent care, tested positive and went to [place].  Had no symptoms.  Received the monoclonal antibody on the 29th.  Creatinine in 3's in [facility] and he was admitted and received 5 days of IV antibiotics for urine infection.  Pt admitted June 2nd 2021 for CHELA and hyperkalemia.  Found to have UTI and treated.  Had pseudomonas in urine and received zosyn.  CHELA improved before discharge.  Creatinine decreased to 2.7 before discharge.  Stent internalized  Receiving RT therapy for prostate. Has had admissions with urinary retention after RT started.   Saw an oncologist who recently performed labs and mentioned that creatinine was high.  Pt still has armenta catheter and was advised to have a TURP.  Pt will be seeing Dr. Collins this Thursday.  Had a positive urine culture end of September.    friend Dr. Sean Salinas [phone].  Pt was admitted to [facility] 11/5 for IV abx prior to planned TURP 11/8.  11/9/21 - s/p TURP and transplant kidney stent replacement.  Armenta removed. Pt voided very light pink about 250 cc.   Pt has followed up with Dr. Phillip  [de-identified] : Creatinine 2.4 last.  Had left TKR May 30th 2023.  Still has some pain but overall ok.  Right knee s/p intra-articular injection.  Has occasional diarrhea.  Blood pressure and sugar is controlled.  Has urinary incontinence and following with Dr. Phillip.  Going for ureteral stent exchange later this month.    Had ureteral stent exchanged 9/2024.  Planning right TKR.  Still has diarrhea but less than usual.  He has gained weight and noticed BP has been higher.

## 2024-02-01 NOTE — PHYSICAL EXAM
[General Appearance - Alert] : alert [General Appearance - In No Acute Distress] : in no acute distress [General Appearance - Well Nourished] : well nourished [Sclera] : the sclera and conjunctiva were normal [Extraocular Movements] : extraocular movements were intact [Neck Appearance] : the appearance of the neck was normal [Neck Cervical Mass (___cm)] : no neck mass was observed [] : no respiratory distress [Respiration, Rhythm And Depth] : normal respiratory rhythm and effort [Exaggerated Use Of Accessory Muscles For Inspiration] : no accessory muscle use [Auscultation Breath Sounds / Voice Sounds] : lungs were clear to auscultation bilaterally [Heart Rate And Rhythm] : heart rate was normal and rhythm regular [Heart Sounds] : normal S1 and S2 [Heart Sounds Gallop] : no gallops [Murmurs] : no murmurs [Bowel Sounds] : normal bowel sounds [Abdomen Soft] : soft [Abdomen Tenderness] : non-tender [Cervical Lymph Nodes Enlarged Posterior Bilaterally] : posterior cervical [Cervical Lymph Nodes Enlarged Anterior Bilaterally] : anterior cervical [Supraclavicular Lymph Nodes Enlarged Bilaterally] : supraclavicular [Abnormal Walk] : normal gait [Skin Color & Pigmentation] : normal skin color and pigmentation [Skin Turgor] : normal skin turgor [No Focal Deficits] : no focal deficits [Oriented To Time, Place, And Person] : oriented to person, place, and time [Impaired Insight] : insight and judgment were intact [Affect] : the affect was normal [FreeTextEntry1] : multiple abdominal scars

## 2024-02-02 LAB
ALBUMIN SERPL ELPH-MCNC: 3.9 G/DL
ALP BLD-CCNC: 87 U/L
ALT SERPL-CCNC: 12 U/L
ANION GAP SERPL CALC-SCNC: 11 MMOL/L
APPEARANCE: ABNORMAL
AST SERPL-CCNC: 21 U/L
BACTERIA: ABNORMAL /HPF
BASOPHILS # BLD AUTO: 0.03 K/UL
BASOPHILS NFR BLD AUTO: 0.5 %
BILIRUB SERPL-MCNC: 0.2 MG/DL
BILIRUBIN URINE: NEGATIVE
BKV DNA SPEC QL NAA+PROBE: NOT DETECTED IU/ML
BLOOD URINE: ABNORMAL
BUN SERPL-MCNC: 25 MG/DL
CALCIUM SERPL-MCNC: 10 MG/DL
CALCIUM SERPL-MCNC: 10 MG/DL
CAST: 3 /LPF
CHLORIDE SERPL-SCNC: 105 MMOL/L
CMV DNA SPEC QL NAA+PROBE: NOT DETECTED IU/ML
CMVPCR LOG: NOT DETECTED LOG10IU/ML
CO2 SERPL-SCNC: 25 MMOL/L
COLOR: NORMAL
CREAT SERPL-MCNC: 2.75 MG/DL
CREAT SPEC-SCNC: 159 MG/DL
CREAT/PROT UR: 1.4 RATIO
EGFR: 23 ML/MIN/1.73M2
EOSINOPHIL # BLD AUTO: 0.08 K/UL
EOSINOPHIL NFR BLD AUTO: 1.4 %
EPITHELIAL CELLS: 1 /HPF
GLUCOSE QUALITATIVE U: NEGATIVE MG/DL
GLUCOSE SERPL-MCNC: 122 MG/DL
HCT VFR BLD CALC: 35.5 %
HGB BLD-MCNC: 11.3 G/DL
IMM GRANULOCYTES NFR BLD AUTO: 0.3 %
KETONES URINE: NEGATIVE MG/DL
LEUKOCYTE ESTERASE URINE: ABNORMAL
LYMPHOCYTES # BLD AUTO: 2.39 K/UL
LYMPHOCYTES NFR BLD AUTO: 41.1 %
MAGNESIUM SERPL-MCNC: 1.8 MG/DL
MAN DIFF?: NORMAL
MCHC RBC-ENTMCNC: 29.9 PG
MCHC RBC-ENTMCNC: 31.8 GM/DL
MCV RBC AUTO: 93.9 FL
MICROSCOPIC-UA: NORMAL
MONOCYTES # BLD AUTO: 0.42 K/UL
MONOCYTES NFR BLD AUTO: 7.2 %
NEUTROPHILS # BLD AUTO: 2.87 K/UL
NEUTROPHILS NFR BLD AUTO: 49.5 %
NITRITE URINE: NEGATIVE
PARATHYROID HORMONE INTACT: 55 PG/ML
PH URINE: 6
PHOSPHATE SERPL-MCNC: 3 MG/DL
PLATELET # BLD AUTO: 188 K/UL
POTASSIUM SERPL-SCNC: 4.6 MMOL/L
PROT SERPL-MCNC: 6.4 G/DL
PROT UR-MCNC: 217 MG/DL
PROTEIN URINE: 300 MG/DL
RBC # BLD: 3.78 M/UL
RBC # FLD: 13.3 %
RED BLOOD CELLS URINE: 69 /HPF
REVIEW: NORMAL
SODIUM SERPL-SCNC: 141 MMOL/L
SPECIFIC GRAVITY URINE: 1.02
TACROLIMUS SERPL-MCNC: 9.1 NG/ML
UROBILINOGEN URINE: 0.2 MG/DL
WBC # FLD AUTO: 5.81 K/UL
WBC CLUMPS: PRESENT
WHITE BLOOD CELLS URINE: 677 /HPF
YEAST-LIKE CELLS: PRESENT

## 2024-02-12 RX ORDER — PREDNISONE 5 MG/1
5 TABLET ORAL
Qty: 90 | Refills: 3 | Status: ACTIVE | COMMUNITY
Start: 2018-07-19 | End: 1900-01-01

## 2024-02-13 ENCOUNTER — APPOINTMENT (OUTPATIENT)
Dept: RADIATION ONCOLOGY | Facility: CLINIC | Age: 74
End: 2024-02-13
Payer: MEDICARE

## 2024-02-13 VITALS
BODY MASS INDEX: 29.29 KG/M2 | HEART RATE: 99 BPM | SYSTOLIC BLOOD PRESSURE: 154 MMHG | HEIGHT: 70 IN | RESPIRATION RATE: 17 BRPM | WEIGHT: 204.59 LBS | DIASTOLIC BLOOD PRESSURE: 72 MMHG | TEMPERATURE: 96.8 F

## 2024-02-13 DIAGNOSIS — N40.1 BENIGN PROSTATIC HYPERPLASIA WITH LOWER URINARY TRACT SYMPMS: ICD-10-CM

## 2024-02-13 DIAGNOSIS — N13.8 BENIGN PROSTATIC HYPERPLASIA WITH LOWER URINARY TRACT SYMPMS: ICD-10-CM

## 2024-02-13 PROCEDURE — 99213 OFFICE O/P EST LOW 20 MIN: CPT

## 2024-02-13 RX ORDER — LACTOBACILLUS ACIDOPHILUS/PECT 30 MG-20MG
TABLET ORAL
Refills: 0 | Status: ACTIVE | COMMUNITY

## 2024-02-13 RX ORDER — TRAMADOL HYDROCHLORIDE 50 MG/1
50 TABLET, COATED ORAL
Qty: 30 | Refills: 0 | Status: DISCONTINUED | COMMUNITY
Start: 2023-07-26 | End: 2024-02-13

## 2024-02-13 RX ORDER — VITAMIN B COMPLEX
CAPSULE ORAL
Refills: 0 | Status: DISCONTINUED | COMMUNITY
Start: 2019-01-03 | End: 2024-02-13

## 2024-02-13 NOTE — REVIEW OF SYSTEMS
[Fatigue] : fatigue [Constipation] : constipation [Diarrhea] : diarrhea [Nocturia] : nocturia [Urinary Frequency] : urinary frequency [IPSS Score (0-40): ___] : IPSS score: [unfilled] [EPIC-CP Score (0-60): ___] : EPIC-CP score: [unfilled] [Joint Pain] : joint pain [Negative] : Heme/Lymph [Constipation: Grade 0] : Constipation: Grade 0 [Diarrhea: Grade 0] : Diarrhea: Grade 0 [Proctitis: Grade 0] : Proctitis: Grade 0 [Rectal Pain: Grade 1 - Mild pain] : Rectal Pain: Grade 1 - Mild pain [Hematuria: Grade 0] : Hematuria: Grade 0 [Urinary Incontinence: Grade 1 - Occasional (e.g., with coughing, sneezing, etc.), pads not indicated] : Urinary Incontinence: Grade 1 - Occasional (e.g., with coughing, sneezing, etc.), pads not indicated [Urinary Retention: Grade 1 - Urinary, suprapubic or intermittent catheter placement not indicated; able to void with some residual] : Urinary Retention: Grade 1 - Urinary, suprapubic or intermittent catheter placement not indicated; able to void with some residual [Urinary Tract Pain: Grade 0] : Urinary Tract Pain: Grade 0 [Urinary Urgency: Grade 0] : Urinary Urgency: Grade 0 [Urinary Frequency: Grade 1 - Present] : Urinary Frequency: Grade 1 - Present [Ejaculation Disorder: Grade 1 - Diminished ejaculation] : Ejaculation Disorder: Grade 1 - Diminished ejaculation  [Erectile Dysfunction: Grade 1 - Decrease in erectile function (frequency or rigidity of erections) but intervention not indicated (e.g., medication or use of mechanical device, penile pump)] : Erectile Dysfunction: Grade 1 - Decrease in erectile function (frequency or rigidity of erections) but intervention not indicated (e.g., medication or use of mechanical device, penile pump) [FreeTextEntry7] : occasional rectal discomfort  [FreeTextEntry8] : QOL 2  [FreeTextEntry9] : left knee discomfort  [de-identified] : occasional  [FreeTextEntry2] : leakage [FreeTextEntry3] : occasional  [FreeTextEntry5] : occasional

## 2024-02-13 NOTE — VITALS
[Maximal Pain Intensity: 5/10] : 5/10 [Pain Description/Quality: ___] : Pain description/quality: [unfilled] [Pain Duration: ___] : Pain duration: [unfilled] [Pain Location: ___] : Pain Location: [unfilled] [Opioid] : opioid [Maximal Pain Intensity: 4/10] : 4/10 [Least Pain Intensity: 0/10] : 0/10 [OTC] : OTC [90: Able to carry normal activity; minor signs or symptoms of disease.] : 90: Able to carry normal activity; minor signs or symptoms of disease.  [ECOG Performance Status: 1 - Restricted in physically strenuous activity but ambulatory and able to carry out work of a light or sedentary nature] : Performance Status: 1 - Restricted in physically strenuous activity but ambulatory and able to carry out work of a light or sedentary nature, e.g., light house work, office work

## 2024-02-13 NOTE — HISTORY OF PRESENT ILLNESS
[FreeTextEntry1] : Mr. Medrano is a 74 y/o male with h/o kidney transplant (bilateral nephrectomy) in 2018, HTN, DM2 with prostate cancer.   He is being seen in follow up visit.   He is unaccompanied for today's visit.    Diagnosis: 3/16/21 Unfavorable intermediate risk prostate cancer, G 4+3=7, PSA 5.27 ng/mL, MRI T2N0  RADIATION Treatment: (Dr Calderon) Treatment Site: Prostate/SV   7,000 cGy from 7/09/2021 - 8/27/2021 with ADT Clinical Response: Tolerated the treatment well.  Orgovyx delivered by Dr Phillip   PSA Trend-   5/25/18 - 3.41 ng/mL  10/26/20 - 4.29 12/7/20 - 5.27 10/20/21 - 0.01 1/19/22 - 0.03 10/6/22 - 0.02 2/16/23 - 0.02 7/20/23 - 0.04  Interval Labs/Imaging/Procedures:  1/7/22 - US Transplant Kidney: Mild hydronephrosis, increased compared to prior study. Ureteral stent. Redemonstrated urothelial thickening. Elevated resistive indices, similar to prior examinations. No evidence of a significant renal artery stenosis.  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 Kumar (colorectal)  04/20/2022 - He presented for routine follow-up.  He continues to follow with Dr. Phillip (urology).   Mr. Gay is complaining of rectal pain, s/p procedure on 4/5/22... taking Tylenol prn.  Urinary symptoms are stable, has some issues with weak stream and dribbling at times.   IPSS 9 / EPIC 23  8/15/22 - underwent TURP, stent exchange with Dr. Phillip (urology).  Pathology - Prostate chips, necrotic tissue   10/28/22 - presented for follow up visit.  Reports urinary leakage unchanged. Denies dysuria. Reports urinary frequency, unchanged. Had episode of hematuria which resolved. Found to be a UTI treated with cipro. Denies any other episode. Reports proctitis unchanged.  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 This is improving but he still struggles with rectal pain, especially on passing stool. No blood but occasional mucous. Try pramoxine suppositories.  IPSS 12 / EPIC 21 Follow up 6 months with PSA.   8/8/23 - presented for follow up visit.  Mr. Medrano is doing alright.  He still complains of on and off rectal discomfort with bowel movements, denies any blood.  Urinary issues with leakage, frequency, and nocturia 3x persist, continues on Tamsulosin.  He continues to follow closely with Dr. Phillip (urology) last seen 7/20/23 and is to have ureteral stent replaced in September. IPSS 15 / QOL 3 / EPIC 29 Occasional rectal pain and mucous, no blood. Urinary symptoms are stable. Advised to follow up with Dr Kumar for ongoing rectal symptoms. Tried to re-prescribe Pramazone suppositories, but these are not covered by insurance. FU 6m.  8/16/23 - US Trans. Kidney with Doppler, RIGHT: No evidence of a significant renal artery stenosis. Mild pelvic fullness with ureteral stent in situ. Stable resistive indices compared to sonogram from 2022.  2/13/24 - presents for follow up visit. Mr. Medrano feels alright today, denies pain.  He continues to have occasional rectal discomfort with liquid bowel movements, denies any blood, does feel that things have improved. Urinary issues with leakage, frequency, some urgency, and nocturia 3-4x persist. IPSS 9 / QOL 2 / EPIC 21 He saw Dr. Stack (surgery) in follow up on 10/2/23, no evidence of condyloma, treated for possible fungal infection. He continues to follow with urology, Dr. Phillip, last seen 10/2/23.

## 2024-02-15 ENCOUNTER — APPOINTMENT (OUTPATIENT)
Dept: TRANSPLANT | Facility: CLINIC | Age: 74
End: 2024-02-15

## 2024-02-16 ENCOUNTER — NON-APPOINTMENT (OUTPATIENT)
Age: 74
End: 2024-02-16

## 2024-02-16 LAB
ALBUMIN SERPL ELPH-MCNC: 4.1 G/DL
ALP BLD-CCNC: 103 U/L
ALT SERPL-CCNC: 17 U/L
ANION GAP SERPL CALC-SCNC: 12 MMOL/L
APPEARANCE: ABNORMAL
AST SERPL-CCNC: 28 U/L
BACTERIA: ABNORMAL /HPF
BASOPHILS # BLD AUTO: 0.04 K/UL
BASOPHILS NFR BLD AUTO: 0.6 %
BILIRUB SERPL-MCNC: 0.3 MG/DL
BILIRUBIN URINE: NEGATIVE
BLOOD URINE: ABNORMAL
BUN SERPL-MCNC: 38 MG/DL
CALCIUM SERPL-MCNC: 9.9 MG/DL
CAST: 3 /LPF
CHLORIDE SERPL-SCNC: 103 MMOL/L
CO2 SERPL-SCNC: 23 MMOL/L
COLOR: YELLOW
CREAT SERPL-MCNC: 2.78 MG/DL
CREAT SPEC-SCNC: 158 MG/DL
CREAT/PROT UR: 1.6 RATIO
EGFR: 23 ML/MIN/1.73M2
EOSINOPHIL # BLD AUTO: 0.08 K/UL
EOSINOPHIL NFR BLD AUTO: 1.1 %
EPITHELIAL CELLS: 1 /HPF
GLUCOSE QUALITATIVE U: NEGATIVE MG/DL
GLUCOSE SERPL-MCNC: 197 MG/DL
HCT VFR BLD CALC: 37.1 %
HGB BLD-MCNC: 12.1 G/DL
IMM GRANULOCYTES NFR BLD AUTO: 0.8 %
KETONES URINE: NEGATIVE MG/DL
LEUKOCYTE ESTERASE URINE: ABNORMAL
LYMPHOCYTES # BLD AUTO: 2.66 K/UL
LYMPHOCYTES NFR BLD AUTO: 36.6 %
MAGNESIUM SERPL-MCNC: 1.6 MG/DL
MAN DIFF?: NORMAL
MCHC RBC-ENTMCNC: 30 PG
MCHC RBC-ENTMCNC: 32.6 GM/DL
MCV RBC AUTO: 91.8 FL
MICROSCOPIC-UA: NORMAL
MONOCYTES # BLD AUTO: 0.57 K/UL
MONOCYTES NFR BLD AUTO: 7.8 %
NEUTROPHILS # BLD AUTO: 3.86 K/UL
NEUTROPHILS NFR BLD AUTO: 53.1 %
NITRITE URINE: NEGATIVE
PH URINE: 5.5
PHOSPHATE SERPL-MCNC: 3 MG/DL
PLATELET # BLD AUTO: 189 K/UL
POTASSIUM SERPL-SCNC: 4.9 MMOL/L
PROT SERPL-MCNC: 6.7 G/DL
PROT UR-MCNC: 249 MG/DL
PROTEIN URINE: 300 MG/DL
RBC # BLD: 4.04 M/UL
RBC # FLD: 13.2 %
RED BLOOD CELLS URINE: 34 /HPF
SODIUM SERPL-SCNC: 138 MMOL/L
SPECIFIC GRAVITY URINE: 1.02
TACROLIMUS SERPL-MCNC: 7 NG/ML
UROBILINOGEN URINE: 0.2 MG/DL
WBC # FLD AUTO: 7.27 K/UL
WHITE BLOOD CELLS URINE: >998 /HPF

## 2024-02-21 ENCOUNTER — APPOINTMENT (OUTPATIENT)
Dept: ORTHOPEDIC SURGERY | Facility: CLINIC | Age: 74
End: 2024-02-21
Payer: MEDICARE

## 2024-02-21 ENCOUNTER — APPOINTMENT (OUTPATIENT)
Dept: ORTHOPEDIC SURGERY | Facility: CLINIC | Age: 74
End: 2024-02-21

## 2024-02-21 VITALS — HEIGHT: 70 IN | WEIGHT: 204 LBS | BODY MASS INDEX: 29.2 KG/M2

## 2024-02-21 DIAGNOSIS — Z96.652 PRESENCE OF LEFT ARTIFICIAL KNEE JOINT: ICD-10-CM

## 2024-02-21 PROCEDURE — 99214 OFFICE O/P EST MOD 30 MIN: CPT

## 2024-02-21 NOTE — PHYSICAL EXAM
[de-identified] : LEFT KNEE\par  Incision is clean and dry with no drainage - dressing removed -\par  Sensation intact\par  Motor 5/5 \par  +1 edema LE\par  ROM 3-120\par  \par  Xray 3 views Left knee - Implants good position and well fixed \par  \par  RLE:\par  MJT+\par  rom 7-125\par  motor intach ehl fhl ta g\par  silt sp dp s s pt\par  toes wwp bcr

## 2024-02-21 NOTE — ASSESSMENT
[FreeTextEntry1] : 73M 6mos s/p L TKA; adv R knee OA  Continue HEP for L knee Will plan for R TKA when cleared, kena mcclellan explained to patient   We discussed my findings and the natural history of their condition. We talked about the details of the proposed surgery and the recovery. We discussed the material risks, possible benefits and alternatives to surgery. The risks include but are not limited to infection, bleeding and possible need for blood transfusion, fracture, bowel blockage, bladder retention or infection, need for reoperation, stiffness and/or limited range of motion, possible damage to nerves and blood vessels, failure of fixation of components, risk of deep vein thromboses and pulmonary embolism, wound healing problems, dislocation, and possible leg length discrepancy. Although incredibly rare, we also discussed the risks of a cardiac event, stroke and even death during, or following, the surgery. We discussed the type of implants the patient will be receiving and the type of fixation that will be used, as well as whether a robot or computer navigation aide will be used. The patient understands they will need medical clearance and will attend a preoperative joint education class. We also discussed the type of anesthesia they will receive, and the risks associated with hospital or rehab length of stay, obesity, diabetes and smoking.

## 2024-02-21 NOTE — HISTORY OF PRESENT ILLNESS
[5] : 5 [1] : 2 [Dull/Aching] : dull/aching [] : This patient has had an injection before: yes [Euflexxa] : Euflexxa [de-identified] : 72M p/w sam knee pain L>R today. Has known advanced bilateral knee OA. Has tried visco and CSI which help temporarily. he has tried PT and NSAIDs previously. He is unsure if he is ready for TKA at this time. 1/18/23: f/u sam knees, still having pain and weakness in both his knees, considering TKA 2/1/23: Here for Euflexxa #2 L knee  2/8/23 euflexxa 3 L knee improving 3/22/23 f/u sam knees, some relief from gel series but still having moments of sevfre pain, especially with stairs, would like to discuss TKA  6/21/23: 3 weeks s/p L TKA. Notes he is gradually improving. Taking oxy prn.  7/19/23 7 weeks s/p L TKA, improving with PT, pain is steadily improving, denies n/v/f/c 7/26/23: Here for Euflexxa #2 R knee  8/9/23 euflexxa 3 today 9/20/23 4 mo s/p L TKA, no pain, walking unassisted, feels a little weak, right knee is doing OK after euflexxa series,done with PT, has not kept up with home exercises 11/29/23: 6 mon s/p L TKA. f/up R knee. He has been having gradually worsening pain. Had some relief after Euflexxa series (finished 8/9/23)  2/21/24: f/up R knee. He was not cleared by cardiology and therefore his R TKA was cancelled in February. He is scheduled for a stress test next week [FreeTextEntry1] : R knee [FreeTextEntry5] : 2/21/24- pt is here today for follow up on the rt knee. pt states no changes within the knee. [de-identified] : no

## 2024-02-22 NOTE — DISCHARGE NOTE ADULT - PATIENT PORTAL LINK FT
You can access the [facility] Patient Portal, offered by [facility], by registering with the following website: [email or web address]
Vaccine status unknown

## 2024-03-04 ENCOUNTER — APPOINTMENT (OUTPATIENT)
Dept: TRANSPLANT | Facility: CLINIC | Age: 74
End: 2024-03-04

## 2024-03-04 LAB
ALBUMIN SERPL ELPH-MCNC: 4.1 G/DL
ALP BLD-CCNC: 91 U/L
ALT SERPL-CCNC: 10 U/L
ANION GAP SERPL CALC-SCNC: 11 MMOL/L
APPEARANCE: ABNORMAL
AST SERPL-CCNC: 22 U/L
BACTERIA: ABNORMAL /HPF
BASOPHILS # BLD AUTO: 0.03 K/UL
BASOPHILS NFR BLD AUTO: 0.5 %
BILIRUB SERPL-MCNC: 0.2 MG/DL
BILIRUBIN URINE: NEGATIVE
BLOOD URINE: ABNORMAL
BUN SERPL-MCNC: 29 MG/DL
CALCIUM SERPL-MCNC: 9.9 MG/DL
CALCIUM SERPL-MCNC: 9.9 MG/DL
CAST: 3 /LPF
CHLORIDE SERPL-SCNC: 106 MMOL/L
CO2 SERPL-SCNC: 22 MMOL/L
COLOR: YELLOW
CREAT SERPL-MCNC: 2.71 MG/DL
CREAT SPEC-SCNC: 144 MG/DL
CREAT/PROT UR: 1.3 RATIO
EGFR: 24 ML/MIN/1.73M2
EOSINOPHIL # BLD AUTO: 0.13 K/UL
EOSINOPHIL NFR BLD AUTO: 2 %
EPITHELIAL CELLS: 1 /HPF
GLUCOSE QUALITATIVE U: NEGATIVE MG/DL
GLUCOSE SERPL-MCNC: 132 MG/DL
HCT VFR BLD CALC: 36.9 %
HGB BLD-MCNC: 11.6 G/DL
IMM GRANULOCYTES NFR BLD AUTO: 0.3 %
KETONES URINE: NEGATIVE MG/DL
LEUKOCYTE ESTERASE URINE: ABNORMAL
LYMPHOCYTES # BLD AUTO: 3.18 K/UL
LYMPHOCYTES NFR BLD AUTO: 47.8 %
MAGNESIUM SERPL-MCNC: 1.6 MG/DL
MAN DIFF?: NORMAL
MCHC RBC-ENTMCNC: 30.3 PG
MCHC RBC-ENTMCNC: 31.4 GM/DL
MCV RBC AUTO: 96.3 FL
MICROSCOPIC-UA: NORMAL
MONOCYTES # BLD AUTO: 0.58 K/UL
MONOCYTES NFR BLD AUTO: 8.7 %
NEUTROPHILS # BLD AUTO: 2.71 K/UL
NEUTROPHILS NFR BLD AUTO: 40.7 %
NITRITE URINE: POSITIVE
PARATHYROID HORMONE INTACT: 52 PG/ML
PH URINE: 6
PHOSPHATE SERPL-MCNC: 3.6 MG/DL
PLATELET # BLD AUTO: 205 K/UL
POTASSIUM SERPL-SCNC: 4.8 MMOL/L
PROT SERPL-MCNC: 6.8 G/DL
PROT UR-MCNC: 183 MG/DL
PROTEIN URINE: 300 MG/DL
RBC # BLD: 3.83 M/UL
RBC # FLD: 13.3 %
RED BLOOD CELLS URINE: 16 /HPF
SODIUM SERPL-SCNC: 139 MMOL/L
SPECIFIC GRAVITY URINE: 1.02
TACROLIMUS SERPL-MCNC: 3.5 NG/ML
UROBILINOGEN URINE: 0.2 MG/DL
WBC # FLD AUTO: 6.65 K/UL
WHITE BLOOD CELLS URINE: 515 /HPF

## 2024-03-06 ENCOUNTER — NON-APPOINTMENT (OUTPATIENT)
Age: 74
End: 2024-03-06

## 2024-03-06 LAB
CMV DNA SPEC QL NAA+PROBE: NOT DETECTED IU/ML
CMVPCR LOG: NOT DETECTED LOG10IU/ML

## 2024-03-07 RX ORDER — TACROLIMUS 1 MG/1
1 TABLET, EXTENDED RELEASE ORAL DAILY
Qty: 120 | Refills: 11 | Status: DISCONTINUED | COMMUNITY
Start: 2020-07-21 | End: 2024-03-07

## 2024-03-07 RX ORDER — TACROLIMUS 4 MG/1
4 TABLET, EXTENDED RELEASE ORAL
Qty: 90 | Refills: 3 | Status: ACTIVE | COMMUNITY
Start: 2024-03-07 | End: 1900-01-01

## 2024-03-07 NOTE — H&P PST ADULT - ALLERGIC/IMMUNOLOGIC
03/06/24 1627   Wound 02/29/24 Leg Left;Anterior   Date First Assessed/Time First Assessed: 02/29/24 1615   Present on Original Admission: Yes  Wound Approximate Age at First Assessment (Weeks): 4 weeks  Primary Wound Type: Venous Ulcer  Location: Leg  Wound Location Orientation: Left;Anterior   Wound Image     Wound Etiology Venous   Dressing Status New dressing applied   Wound Cleansed Wound cleanser   Dressing/Treatment   (tubigrip size E)   Offloading for Diabetic Foot Ulcers Offloading not required   Dressing Change Due   (pt discharged)   Wound Length (cm)   (wounds clinically closed)   Wound Assessment Epithelialization   Drainage Amount None (dry)   Odor None   Arlen-wound Assessment Dry/flaky   Wound 02/29/24 Leg Anterior;Right   Date First Assessed: 02/29/24   Present on Original Admission: Yes  Wound Approximate Age at First Assessment (Weeks): 4 weeks  Primary Wound Type: Venous Ulcer  Location: Leg  Wound Location Orientation: Anterior;Right   Wound Image     Wound Etiology Venous   Dressing Status New dressing applied   Wound Cleansed Wound cleanser   Dressing/Treatment Tubular bandage  (tubigrip size E)   Offloading for Diabetic Foot Ulcers Offloading not required   Dressing Change Due   (Pt discharged)   Wound Length (cm)   (wounds clinically closed)   Wound Assessment Epithelialization   Drainage Amount None (dry)   Odor None     Tubigrip size E applied. Pt did not bring his velcro wraps, instructed to apply wraps once he gets home.  Discharged from wound clinic  
negative

## 2024-03-08 LAB
BACTERIA UR CULT: ABNORMAL
BKV DNA SPEC QL NAA+PROBE: NOT DETECTED IU/ML

## 2024-03-13 ENCOUNTER — APPOINTMENT (OUTPATIENT)
Dept: NEPHROLOGY | Facility: CLINIC | Age: 74
End: 2024-03-13
Payer: MEDICARE

## 2024-03-13 VITALS
OXYGEN SATURATION: 98 % | HEIGHT: 70 IN | TEMPERATURE: 96.3 F | SYSTOLIC BLOOD PRESSURE: 160 MMHG | RESPIRATION RATE: 17 BRPM | BODY MASS INDEX: 29.78 KG/M2 | DIASTOLIC BLOOD PRESSURE: 87 MMHG | HEART RATE: 103 BPM | WEIGHT: 208 LBS

## 2024-03-13 VITALS — DIASTOLIC BLOOD PRESSURE: 74 MMHG | SYSTOLIC BLOOD PRESSURE: 140 MMHG

## 2024-03-13 DIAGNOSIS — I10 ESSENTIAL (PRIMARY) HYPERTENSION: ICD-10-CM

## 2024-03-13 DIAGNOSIS — Z94.0 KIDNEY TRANSPLANT STATUS: ICD-10-CM

## 2024-03-13 DIAGNOSIS — Z79.899 OTHER LONG TERM (CURRENT) DRUG THERAPY: ICD-10-CM

## 2024-03-13 DIAGNOSIS — N39.0 URINARY TRACT INFECTION, SITE NOT SPECIFIED: ICD-10-CM

## 2024-03-13 PROCEDURE — 99214 OFFICE O/P EST MOD 30 MIN: CPT

## 2024-03-13 RX ORDER — CIPROFLOXACIN HYDROCHLORIDE 500 MG/1
500 TABLET, FILM COATED ORAL
Qty: 14 | Refills: 0 | Status: ACTIVE | COMMUNITY
Start: 2024-03-13 | End: 1900-01-01

## 2024-03-13 RX ORDER — GLIMEPIRIDE 2 MG/1
2 TABLET ORAL TWICE DAILY
Refills: 0 | Status: ACTIVE | COMMUNITY
Start: 2018-09-27

## 2024-03-13 NOTE — ASSESSMENT
[FreeTextEntry1] : 1. CKD of kidney transplantation - Pts aaron creatinine 1.9 but had CHELA with pyelonephritis. Has had multiple episodes of CHELA.  Pt in process of relisting but was on hold due to prostate cancer and now GFR too high.  Cell free DNA 0.2% in October end.  Last creatinine 2.7.  Pt also has internal JJ stent being exchanged by Dr. Phillip.  Last exchange 9/ 2023.  2. Immunosuppression - simulect induction, tacrolimus target 5-7, MMF 250mgs BID and pred 5.   3. DM2 - on glimepiride.  Per pt last A1c was 6.4.  4. HTN - BP improved.  Stable.   5. HCV - genotype 3a. completed Epclusa. Last vl note detected.  6. Anemia - due to CKD/CHELA.  last Hb at goal.  7. Chronic diarrhea - on and off on lomotil prn. Has seen GI and had a colonoscopy in 2019. Has history of small bowel resection which may be the cause of diarrhea.  8. 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  9.  UTI - last culture positive - start cipro 500mgs BID.  No serious infections since last TURP and stent exchange.  10.  Prostate cancer - Completed androgen depletion and completed RT.   11.  Urine retention - now s/p TURP then removal of necrotic prostate debris.  He will f/u with Dr. Phillip.  Has some incontinence.  Flomax was stopped.  12.  Anal fissure s/p surgery -  Following with colorectal surgery and GI  f/u in 1 month.

## 2024-03-13 NOTE — PHYSICAL EXAM
[General Appearance - Alert] : alert [General Appearance - In No Acute Distress] : in no acute distress [General Appearance - Well Nourished] : well nourished [Sclera] : the sclera and conjunctiva were normal [Extraocular Movements] : extraocular movements were intact [Neck Appearance] : the appearance of the neck was normal [Neck Cervical Mass (___cm)] : no neck mass was observed [] : no respiratory distress [Respiration, Rhythm And Depth] : normal respiratory rhythm and effort [Exaggerated Use Of Accessory Muscles For Inspiration] : no accessory muscle use [Heart Rate And Rhythm] : heart rate was normal and rhythm regular [Auscultation Breath Sounds / Voice Sounds] : lungs were clear to auscultation bilaterally [Heart Sounds] : normal S1 and S2 [Heart Sounds Gallop] : no gallops [Murmurs] : no murmurs [Bowel Sounds] : normal bowel sounds [Abdomen Tenderness] : non-tender [Abdomen Soft] : soft [Cervical Lymph Nodes Enlarged Posterior Bilaterally] : posterior cervical [Cervical Lymph Nodes Enlarged Anterior Bilaterally] : anterior cervical [Supraclavicular Lymph Nodes Enlarged Bilaterally] : supraclavicular [Abnormal Walk] : normal gait [Skin Color & Pigmentation] : normal skin color and pigmentation [Skin Turgor] : normal skin turgor [No Focal Deficits] : no focal deficits [Impaired Insight] : insight and judgment were intact [Oriented To Time, Place, And Person] : oriented to person, place, and time [Affect] : the affect was normal [FreeTextEntry1] : multiple abdominal scars

## 2024-03-13 NOTE — HISTORY OF PRESENT ILLNESS
[80: Normal activity with effort; some signs or symptoms of disease.] : 80: Normal activity with effort; some signs or symptoms of disease.  [FreeTextEntry1] : Tube exchanged October 29th.  Pt diagnosed with Covid, wife was diagnosed with Covid on Jan 28th 2021.  On 29th pt was tested in an urgent care, tested positive and went to [facility].  Had no symptoms.  Received the monoclonal antibody on the 29th.  Creatinine in 3's in [facility] and he was admitted and received 5 days of IV antibiotics for urine infection.  Pt admitted June 2nd 2021 for CHELA and hyperkalemia.  Found to have UTI and treated.  Had pseudomonas in urine and received zosyn.  CHELA improved before discharge.  Creatinine decreased to 2.7 before discharge.  Stent internalized  Receiving RT therapy for prostate. Has had admissions with urinary retention after RT started.   Saw an oncologist who recently performed labs and mentioned that creatinine was high.  Pt still has armenta catheter and was advised to have a TURP.  Pt will be seeing Dr. Collins this Thursday.  Had a positive urine culture end of September.    friend Dr. Sean Salinas [phone].  Pt was admitted to [facility] 11/5 for IV abx prior to planned TURP 11/8.  11/9/21 - s/p TURP and transplant kidney stent replacement.  Armenta removed. Pt voided very light pink about 250 cc.   Pt has followed up with Dr. Phillip  [de-identified] : Creatinine 2.4 last.  Had left TKR May 30th 2023.  Still has some pain but overall ok.  Right knee s/p intra-articular injection.  Has occasional diarrhea.  Blood pressure and sugar is controlled.  Has urinary incontinence and following with Dr. Phillip.  Going for ureteral stent exchange later this month.    Had ureteral stent exchanged 9/2023.  Planning right TKR.  Still has diarrhea but less than usual.  He is going to see cardiology for clearance for TKR.  Had positive urine culture but did not get prescribed cipro.  He is asymptomatic.

## 2024-03-14 ENCOUNTER — NON-APPOINTMENT (OUTPATIENT)
Age: 74
End: 2024-03-14

## 2024-03-14 LAB
ALBUMIN SERPL ELPH-MCNC: 4.2 G/DL
ALP BLD-CCNC: 92 U/L
ALT SERPL-CCNC: 13 U/L
ANION GAP SERPL CALC-SCNC: 16 MMOL/L
APPEARANCE: ABNORMAL
AST SERPL-CCNC: 19 U/L
BACTERIA: ABNORMAL /HPF
BASOPHILS # BLD AUTO: 0.04 K/UL
BASOPHILS NFR BLD AUTO: 0.5 %
BILIRUB SERPL-MCNC: 0.3 MG/DL
BILIRUBIN URINE: NEGATIVE
BLOOD URINE: ABNORMAL
BUN SERPL-MCNC: 33 MG/DL
CALCIUM SERPL-MCNC: 10.5 MG/DL
CALCIUM SERPL-MCNC: 10.5 MG/DL
CAST: 1 /LPF
CHLORIDE SERPL-SCNC: 105 MMOL/L
CMV DNA SPEC QL NAA+PROBE: NOT DETECTED IU/ML
CMVPCR LOG: NOT DETECTED LOG10IU/ML
CO2 SERPL-SCNC: 20 MMOL/L
COLOR: YELLOW
CREAT SERPL-MCNC: 3.06 MG/DL
CREAT SPEC-SCNC: 125 MG/DL
CREAT/PROT UR: 1.7 RATIO
EGFR: 21 ML/MIN/1.73M2
EOSINOPHIL # BLD AUTO: 0.06 K/UL
EOSINOPHIL NFR BLD AUTO: 0.7 %
EPITHELIAL CELLS: 0 /HPF
GLUCOSE QUALITATIVE U: NEGATIVE MG/DL
GLUCOSE SERPL-MCNC: 154 MG/DL
HCT VFR BLD CALC: 34.8 %
HGB BLD-MCNC: 11.6 G/DL
IMM GRANULOCYTES NFR BLD AUTO: 1.1 %
KETONES URINE: NEGATIVE MG/DL
LEUKOCYTE ESTERASE URINE: ABNORMAL
LYMPHOCYTES # BLD AUTO: 2.08 K/UL
LYMPHOCYTES NFR BLD AUTO: 25.6 %
MAGNESIUM SERPL-MCNC: 1.7 MG/DL
MAN DIFF?: NORMAL
MCHC RBC-ENTMCNC: 29.9 PG
MCHC RBC-ENTMCNC: 33.3 GM/DL
MCV RBC AUTO: 89.7 FL
MICROSCOPIC-UA: NORMAL
MONOCYTES # BLD AUTO: 0.62 K/UL
MONOCYTES NFR BLD AUTO: 7.6 %
NEUTROPHILS # BLD AUTO: 5.25 K/UL
NEUTROPHILS NFR BLD AUTO: 64.5 %
NITRITE URINE: NEGATIVE
PARATHYROID HORMONE INTACT: 53 PG/ML
PH URINE: 6
PHOSPHATE SERPL-MCNC: 3.1 MG/DL
PLATELET # BLD AUTO: 219 K/UL
POTASSIUM SERPL-SCNC: 5.1 MMOL/L
PROT SERPL-MCNC: 6.8 G/DL
PROT UR-MCNC: 214 MG/DL
PROTEIN URINE: 300 MG/DL
RBC # BLD: 3.88 M/UL
RBC # FLD: 13.4 %
RED BLOOD CELLS URINE: 15 /HPF
SODIUM SERPL-SCNC: 142 MMOL/L
SPECIFIC GRAVITY URINE: 1.01
TACROLIMUS SERPL-MCNC: 6.4 NG/ML
UROBILINOGEN URINE: 0.2 MG/DL
WBC # FLD AUTO: 8.14 K/UL
WHITE BLOOD CELLS URINE: 575 /HPF

## 2024-03-15 ENCOUNTER — TRANSCRIPTION ENCOUNTER (OUTPATIENT)
Age: 74
End: 2024-03-15

## 2024-04-03 RX ORDER — MYCOPHENOLATE MOFETIL 250 MG/1
250 CAPSULE ORAL TWICE DAILY
Qty: 60 | Refills: 11 | Status: ACTIVE | COMMUNITY
Start: 2021-06-10 | End: 1900-01-01

## 2024-04-10 ENCOUNTER — APPOINTMENT (OUTPATIENT)
Dept: ORTHOPEDIC SURGERY | Facility: CLINIC | Age: 74
End: 2024-04-10
Payer: MEDICARE

## 2024-04-10 VITALS — BODY MASS INDEX: 29.92 KG/M2 | HEIGHT: 70 IN | WEIGHT: 209 LBS

## 2024-04-10 DIAGNOSIS — M17.11 UNILATERAL PRIMARY OSTEOARTHRITIS, RIGHT KNEE: ICD-10-CM

## 2024-04-10 PROCEDURE — 20610 DRAIN/INJ JOINT/BURSA W/O US: CPT | Mod: RT

## 2024-04-10 PROCEDURE — 99214 OFFICE O/P EST MOD 30 MIN: CPT | Mod: 25

## 2024-04-10 NOTE — ASSESSMENT
[FreeTextEntry1] : 73M 6mos s/p L TKA; adv R knee OA  R knee CSI tolerated well return 3 mo   The risks, benefits, contents and alternatives to injection were explained in full to the patient. Risks outlined include but are not limited to infection, sepsis, bleeding, scarring, skin discoloration, temporary increase in pain, syncopal episode, failure to resolve symptoms, allergic reaction, flare reaction, permanent white skin discoloration, symptom recurrence, and elevation of blood sugar in diabetics. Patient understood the risks. All questions were answered. After discussion of options, patient requested an injection. Oral informed consent was obtained and sterile prep was done of the injection site. Sterile technique was used to introduce the mixture. Patient tolerated the procedure well. Patient advised to ice the injection site this evening. Signs and symptoms of infection reviewed and patient advised to call immediately for redness, fevers, and/or chills.

## 2024-04-10 NOTE — PHYSICAL EXAM
[de-identified] : LEFT KNEE\par  Incision is clean and dry with no drainage - dressing removed -\par  Sensation intact\par  Motor 5/5 \par  +1 edema LE\par  ROM 3-120\par  \par  Xray 3 views Left knee - Implants good position and well fixed \par  \par  RLE:\par  MJT+\par  rom 7-125\par  motor intach ehl fhl ta g\par  silt sp dp s s pt\par  toes wwp bcr

## 2024-04-10 NOTE — PROCEDURE
[Large Joint Injection] : Large joint injection [Right] : of the right [Knee] : knee [Pain] : pain [Inflammation] : inflammation [X-ray evidence of Osteoarthritis on this or prior visit] : x-ray evidence of Osteoarthritis on this or prior visit [Alcohol] : alcohol [Betadine] : betadine [Ethyl Chloride sprayed topically] : ethyl chloride sprayed topically [Sterile technique used] : sterile technique used [___ cc    0.5%] : Bupivacaine (Marcaine) ~Vcc of 0.5%  [___ cc    40mg] : Triamcinolone (Kenalog) ~Vcc of 40 mg  [Call if redness, pain or fever occur] : call if redness, pain or fever occur [Apply ice for 15min out of every hour for the next 12-24 hours as tolerated] : apply ice for 15 minutes out of every hour for the next 12-24 hours as tolerated [Previous OTC use and PT nontherapeutic] : patient has tried OTC's including aspirin, Ibuprofen, Aleve, etc or prescription NSAIDS, and/or exercises at home and/or physical therapy without satisfactory response [Risks, benefits, alternatives discussed / Verbal consent obtained] : the risks benefits, and alternatives have been discussed, and verbal consent was obtained

## 2024-04-10 NOTE — HISTORY OF PRESENT ILLNESS
[5] : 5 [1] : 2 [Dull/Aching] : dull/aching [] : This patient has had an injection before: yes [Euflexxa] : Euflexxa [de-identified] : 72M p/w sam knee pain L>R today. Has known advanced bilateral knee OA. Has tried visco and CSI which help temporarily. he has tried PT and NSAIDs previously. He is unsure if he is ready for TKA at this time. 1/18/23: f/u sam knees, still having pain and weakness in both his knees, considering TKA 2/1/23: Here for Euflexxa #2 L knee  2/8/23 euflexxa 3 L knee improving 3/22/23 f/u sam knees, some relief from gel series but still having moments of sevfre pain, especially with stairs, would like to discuss TKA  6/21/23: 3 weeks s/p L TKA. Notes he is gradually improving. Taking oxy prn.  7/19/23 7 weeks s/p L TKA, improving with PT, pain is steadily improving, denies n/v/f/c 7/26/23: Here for Euflexxa #2 R knee  8/9/23 euflexxa 3 today 9/20/23 4 mo s/p L TKA, no pain, walking unassisted, feels a little weak, right knee is doing OK after euflexxa series,done with PT, has not kept up with home exercises 11/29/23: 6 mon s/p L TKA. f/up R knee. He has been having gradually worsening pain. Had some relief after Euflexxa series (finished 8/9/23)  2/21/24: f/up R knee. He was not cleared by cardiology and therefore his R TKA was cancelled in February. He is scheduled for a stress test next week 4/10/24: pt is here today for follow up on rt knee. pt states he still has some pain within the area. [FreeTextEntry1] : R knee [FreeTextEntry5] : 2/21/24- pt is here today for follow up on the rt knee. pt states no changes within the knee. [de-identified] : no

## 2024-04-12 ENCOUNTER — APPOINTMENT (OUTPATIENT)
Dept: NEPHROLOGY | Facility: CLINIC | Age: 74
End: 2024-04-12

## 2024-04-18 ENCOUNTER — APPOINTMENT (OUTPATIENT)
Dept: NEPHROLOGY | Facility: CLINIC | Age: 74
End: 2024-04-18

## 2024-04-18 ENCOUNTER — NON-APPOINTMENT (OUTPATIENT)
Age: 74
End: 2024-04-18

## 2024-04-18 LAB
ALBUMIN SERPL ELPH-MCNC: 4.1 G/DL
ALP BLD-CCNC: 96 U/L
ALT SERPL-CCNC: 15 U/L
ANION GAP SERPL CALC-SCNC: 10 MMOL/L
APPEARANCE: ABNORMAL
AST SERPL-CCNC: 19 U/L
BACTERIA: ABNORMAL /HPF
BASOPHILS # BLD AUTO: 0.04 K/UL
BASOPHILS NFR BLD AUTO: 0.5 %
BILIRUB SERPL-MCNC: 0.4 MG/DL
BILIRUBIN URINE: NEGATIVE
BLOOD URINE: ABNORMAL
BUN SERPL-MCNC: 27 MG/DL
CALCIUM SERPL-MCNC: 10.3 MG/DL
CAST: 0 /LPF
CHLORIDE SERPL-SCNC: 106 MMOL/L
CO2 SERPL-SCNC: 24 MMOL/L
COLOR: YELLOW
CREAT SERPL-MCNC: 2.44 MG/DL
CREAT SPEC-SCNC: 112 MG/DL
CREAT/PROT UR: 2.8 RATIO
EGFR: 27 ML/MIN/1.73M2
EOSINOPHIL # BLD AUTO: 0.1 K/UL
EOSINOPHIL NFR BLD AUTO: 1.2 %
EPITHELIAL CELLS: 0 /HPF
GLUCOSE QUALITATIVE U: NEGATIVE MG/DL
GLUCOSE SERPL-MCNC: 148 MG/DL
HCT VFR BLD CALC: 35.2 %
HGB BLD-MCNC: 11.2 G/DL
IMM GRANULOCYTES NFR BLD AUTO: 1 %
KETONES URINE: NEGATIVE MG/DL
LEUKOCYTE ESTERASE URINE: ABNORMAL
LYMPHOCYTES # BLD AUTO: 2.73 K/UL
LYMPHOCYTES NFR BLD AUTO: 33.2 %
MAN DIFF?: NORMAL
MCHC RBC-ENTMCNC: 29.9 PG
MCHC RBC-ENTMCNC: 31.8 GM/DL
MCV RBC AUTO: 93.9 FL
MICROSCOPIC-UA: NORMAL
MONOCYTES # BLD AUTO: 0.58 K/UL
MONOCYTES NFR BLD AUTO: 7.1 %
NEUTROPHILS # BLD AUTO: 4.69 K/UL
NEUTROPHILS NFR BLD AUTO: 57 %
NITRITE URINE: NEGATIVE
PH URINE: 6
PLATELET # BLD AUTO: 191 K/UL
POTASSIUM SERPL-SCNC: 5.1 MMOL/L
PROT SERPL-MCNC: 6.8 G/DL
PROT UR-MCNC: 312 MG/DL
PROTEIN URINE: 300 MG/DL
RBC # BLD: 3.75 M/UL
RBC # FLD: 14.2 %
RED BLOOD CELLS URINE: 28 /HPF
SODIUM SERPL-SCNC: 139 MMOL/L
SPECIFIC GRAVITY URINE: 1.01
TACROLIMUS SERPL-MCNC: 5.4 NG/ML
UROBILINOGEN URINE: 0.2 MG/DL
WBC # FLD AUTO: 8.22 K/UL
WHITE BLOOD CELLS URINE: 253 /HPF

## 2024-04-19 LAB
CMV DNA SPEC QL NAA+PROBE: NOT DETECTED IU/ML
CMVPCR LOG: NOT DETECTED LOG10IU/ML

## 2024-04-22 LAB — BKV DNA SPEC QL NAA+PROBE: NOT DETECTED IU/ML

## 2024-05-06 ENCOUNTER — RX RENEWAL (OUTPATIENT)
Age: 74
End: 2024-05-06

## 2024-05-22 NOTE — ED ADULT TRIAGE NOTE - BMI (KG/M2)
Problem: Occupational Therapy  Goal: Occupational Therapy Goal  Description: Goals to be met by 6/2/2024:    Patient will increase functional independence with ADLs by performing:     UBD: SPV seated in w/c or EOB- MET   LBD: Min A using AE seated in w/c or EOB- MET  FW: MI using AE seated in w/c-  MET  Showering: Min A seated in w/c or EOB or sinkside- MET  Toileting: Min A seated on BSC- MET   Supine to sit: Min A- MET  Sit to stand: Min A with RW- MET        Outcome: Adequate for Care Transition   Pt is scheduled to d/c from this SNF tomorrow on 5/23/2024 and is appropriate for continued HHOT   32.3

## 2024-05-30 NOTE — PATIENT PROFILE ADULT. - NS TRANSFER EYEGLASSES PAIRS
Consent: The patient's consent was obtained including but not limited to risks of crusting, scabbing, blistering, scarring, darker or lighter pigmentary change, recurrence, incomplete removal and infection. Show Aperture Variable?: Yes Render Note In Bullet Format When Appropriate: No Application Tool (Optional): Liquid Nitrogen Sprayer Post-Care Instructions: I reviewed with the patient in detail post-care instructions. Patient is to wear sunprotection, and avoid picking at any of the treated lesions. Pt may apply Vaseline to crusted or scabbing areas. 1 pair Detail Level: Detailed Aperture Size (Optional): A

## 2024-06-03 ENCOUNTER — RX RENEWAL (OUTPATIENT)
Age: 74
End: 2024-06-03

## 2024-06-03 RX ORDER — NYSTATIN 100000 U/G
100000 OINTMENT TOPICAL 4 TIMES DAILY
Qty: 15 | Refills: 0 | Status: ACTIVE | COMMUNITY
Start: 2022-08-03 | End: 1900-01-01

## 2024-06-19 NOTE — H&P PST ADULT - PROBLEM SELECTOR PLAN 2
[Subsequent Evaluation] : a subsequent evaluation for [Hearing Loss] : hearing loss [FreeTextEntry2] : Cerumen impaction and hearing loss  Right knee arthritis for planned Right total knee replacement with Dr. Campuzano on 1/30/24.

## 2024-07-01 NOTE — ASU PATIENT PROFILE, ADULT - FALL HARM RISK - FACTORS NURSING JUDGEMENT
Cardiology Progress Note                     Date:   7/1/2024  Patient name: Jesús Min  Date of admission:  6/18/2024  3:00 PM  MRN:   [id number]  YOB: 1976  PCP: No primary care provider on file.    Reason for Admission:  NSTEMI, HFrEF    Subjective:       No acute events overnight  Remains hemodynamically stable and in normal sinus rhythm.   Now on room air   LE edema improved  Has been ambulating without any dyspnea or dizziness.       Scheduled Meds:   amiodarone  200 mg Oral BID    empagliflozin  10 mg Oral Daily    [START ON 7/2/2024] furosemide  40 mg Oral Daily    potassium chloride  20 mEq Oral BID    lisinopril  2.5 mg Oral Daily    aspirin  81 mg Oral Daily    sodium chloride flush  5-40 mL IntraVENous 2 times per day    clopidogrel  75 mg Oral Daily    polyethylene glycol  17 g Oral Daily    sennosides-docusate sodium  1 tablet Oral BID    metoprolol tartrate  12.5 mg Oral BID    atorvastatin  20 mg Oral Nightly    pantoprazole  40 mg Oral Daily    Or    pantoprazole (PROTONIX) 40 mg in sodium chloride (PF) 0.9 % 10 mL injection  40 mg IntraVENous Daily    insulin lispro  0-12 Units SubCUTAneous TID WC       Continuous Infusions:   sodium chloride Stopped (06/25/24 1445)    sodium chloride 10 mL/hr at 06/26/24 0700    norepinephrine Stopped (06/25/24 1738)    EPINEPHrine Stopped (06/25/24 1314)    dextrose         Labs:     CBC:   Recent Labs     06/29/24  0757 06/30/24  0826 07/01/24  0556   WBC 10.6 12.5* 15.4*   HGB 10.7* 11.0* 10.3*    292 338     BMP:    Recent Labs     06/29/24  0757 06/30/24  0826 07/01/24  0556    139 140   K 3.8 3.9 4.1    102 102   CO2 26 25 30   BUN 15 16 16   CREATININE 0.9 0.9 1.1   GLUCOSE 108* 104* 97     Hepatic:   No results for input(s): \"AST\", \"ALT\", \"BILITOT\", \"ALKPHOS\" in the last 72 hours.    Invalid input(s): \"ALB\"    Troponin: No results for input(s): \"TROPONINI\" in the last 72 hours.  BNP: No results for  No

## 2024-07-10 ENCOUNTER — RX RENEWAL (OUTPATIENT)
Age: 74
End: 2024-07-10

## 2024-07-15 ENCOUNTER — RX RENEWAL (OUTPATIENT)
Age: 74
End: 2024-07-15

## 2024-07-17 ENCOUNTER — APPOINTMENT (OUTPATIENT)
Dept: ORTHOPEDIC SURGERY | Facility: CLINIC | Age: 74
End: 2024-07-17
Payer: MEDICARE

## 2024-07-17 DIAGNOSIS — M17.11 UNILATERAL PRIMARY OSTEOARTHRITIS, RIGHT KNEE: ICD-10-CM

## 2024-07-17 PROCEDURE — 20610 DRAIN/INJ JOINT/BURSA W/O US: CPT | Mod: RT

## 2024-07-17 PROCEDURE — 99214 OFFICE O/P EST MOD 30 MIN: CPT | Mod: 25

## 2024-07-22 ENCOUNTER — APPOINTMENT (OUTPATIENT)
Dept: NEPHROLOGY | Facility: CLINIC | Age: 74
End: 2024-07-22
Payer: MEDICARE

## 2024-07-22 VITALS — SYSTOLIC BLOOD PRESSURE: 130 MMHG | DIASTOLIC BLOOD PRESSURE: 50 MMHG

## 2024-07-22 VITALS
OXYGEN SATURATION: 98 % | WEIGHT: 208 LBS | BODY MASS INDEX: 29.78 KG/M2 | TEMPERATURE: 98.3 F | DIASTOLIC BLOOD PRESSURE: 80 MMHG | HEIGHT: 70 IN | RESPIRATION RATE: 16 BRPM | SYSTOLIC BLOOD PRESSURE: 164 MMHG | HEART RATE: 104 BPM

## 2024-07-22 DIAGNOSIS — Z79.899 OTHER LONG TERM (CURRENT) DRUG THERAPY: ICD-10-CM

## 2024-07-22 DIAGNOSIS — Z94.0 KIDNEY TRANSPLANT STATUS: ICD-10-CM

## 2024-07-22 DIAGNOSIS — I10 ESSENTIAL (PRIMARY) HYPERTENSION: ICD-10-CM

## 2024-07-22 LAB
ALBUMIN SERPL ELPH-MCNC: 4 G/DL
ALP BLD-CCNC: 95 U/L
ALT SERPL-CCNC: 21 U/L
ANION GAP SERPL CALC-SCNC: 13 MMOL/L
APPEARANCE: ABNORMAL
AST SERPL-CCNC: 22 U/L
BACTERIA: ABNORMAL /HPF
BILIRUB SERPL-MCNC: 0.4 MG/DL
BILIRUBIN URINE: NEGATIVE
BLOOD URINE: ABNORMAL
BUN SERPL-MCNC: 36 MG/DL
CALCIUM SERPL-MCNC: 9.7 MG/DL
CALCIUM SERPL-MCNC: 9.7 MG/DL
CAST: 4 /LPF
CHLORIDE SERPL-SCNC: 105 MMOL/L
CO2 SERPL-SCNC: 20 MMOL/L
COLOR: YELLOW
CREAT SERPL-MCNC: 2.58 MG/DL
CREAT SPEC-SCNC: 133 MG/DL
CREAT/PROT UR: 1.9 RATIO
EGFR: 25 ML/MIN/1.73M2
EPITHELIAL CELLS: 1 /HPF
GLUCOSE QUALITATIVE U: NEGATIVE MG/DL
GLUCOSE SERPL-MCNC: 227 MG/DL
HCT VFR BLD CALC: 39 %
HGB BLD-MCNC: 12.3 G/DL
KETONES URINE: NEGATIVE MG/DL
LEUKOCYTE ESTERASE URINE: ABNORMAL
MAGNESIUM SERPL-MCNC: 1.9 MG/DL
MCHC RBC-ENTMCNC: 28.8 PG
MCHC RBC-ENTMCNC: 31.5 GM/DL
MCV RBC AUTO: 91.3 FL
MICROSCOPIC-UA: NORMAL
NITRITE URINE: NEGATIVE
PARATHYROID HORMONE INTACT: 71 PG/ML
PH URINE: 5.5
PHOSPHATE SERPL-MCNC: 2.9 MG/DL
PLATELET # BLD AUTO: 150 K/UL
POTASSIUM SERPL-SCNC: 4.7 MMOL/L
PROT SERPL-MCNC: 6.7 G/DL
PROT UR-MCNC: 249 MG/DL
PROTEIN URINE: 300 MG/DL
RBC # BLD: 4.27 M/UL
RBC # FLD: 13.7 %
RED BLOOD CELLS URINE: 3 /HPF
REVIEW: NORMAL
SODIUM SERPL-SCNC: 138 MMOL/L
SPECIFIC GRAVITY URINE: 1.01
TACROLIMUS SERPL-MCNC: 6 NG/ML
URATE SERPL-MCNC: 9.2 MG/DL
UROBILINOGEN URINE: 0.2 MG/DL
WBC # FLD AUTO: 9.28 K/UL
WHITE BLOOD CELLS URINE: 373 /HPF

## 2024-07-22 PROCEDURE — 99214 OFFICE O/P EST MOD 30 MIN: CPT

## 2024-07-22 NOTE — ASSESSMENT
[FreeTextEntry1] : 1. CKD of kidney transplantation - Pts aaron creatinine 1.9 but had CHELA with pyelonephritis. Has had multiple episodes of HCELA.  Pt in process of relisting but was on hold due to prostate cancer and now GFR too high.  Cell free DNA 0.2% in October end.  Last creatinine 2.5.  Pt also has internal JJ stent being exchanged by Dr. Phillip.  Last exchange 9/ 2023. He will make a f/u with Dr. Phillip.   2. Immunosuppression - simulect induction, tacrolimus target 5-7, MMF 250mgs BID and pred 5.   3. DM2 - on glimepiride.   4. HTN - BP improved.  Stable.   5. HCV - genotype 3a. completed Epclusa. Last vl note detected.  6. Anemia - due to CKD/CHELA.  last Hb at goal.  7. Chronic diarrhea - on and off on lomotil prn. Has seen GI and had a colonoscopy in 2019. Has history of small bowel resection which may be the cause of diarrhea.  8. 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  9.  UTI - Often has pyuria but no serious infections since last TURP and stent exchange.  10.  Prostate cancer - Completed androgen depletion and completed RT.   11.  Urine retention - now s/p TURP then removal of necrotic prostate debris.  He will f/u with Dr. Phillip.  Has some incontinence.  Flomax was stopped.  12.  Anal fissure s/p surgery -  Following with colorectal surgery and GI  f/u in 3 months.

## 2024-07-22 NOTE — HISTORY OF PRESENT ILLNESS
[80: Normal activity with effort; some signs or symptoms of disease.] : 80: Normal activity with effort; some signs or symptoms of disease.  [FreeTextEntry1] : Tube exchanged October 29th.  Pt diagnosed with Covid, wife was diagnosed with Covid on Jan 28th 2021.  On 29th pt was tested in an urgent care, tested positive and went to [place].  Had no symptoms.  Received the monoclonal antibody on the 29th.  Creatinine in 3's in [facility] and he was admitted and received 5 days of IV antibiotics for urine infection.  Pt admitted June 2nd 2021 for CHELA and hyperkalemia.  Found to have UTI and treated.  Had pseudomonas in urine and received zosyn.  CHELA improved before discharge.  Creatinine decreased to 2.7 before discharge.  Stent internalized  Receiving RT therapy for prostate. Has had admissions with urinary retention after RT started.   Saw an oncologist who recently performed labs and mentioned that creatinine was high.  Pt still has armenta catheter and was advised to have a TURP.  Pt will be seeing Dr. Collins this Thursday.  Had a positive urine culture end of September.    friend Dr. Sean Salinas [phone].  Pt was admitted to [facility] 11/5 for IV abx prior to planned TURP 11/8.  11/9/21 - s/p TURP and transplant kidney stent replacement.  Armenta removed. Pt voided very light pink about 250 cc.   Pt has followed up with Dr. Phillip  [de-identified] : Creatinine 2.4 last.  Had left TKR May 30th 2023.  Still has some pain but overall ok.  Right knee s/p intra-articular injection.  Has occasional diarrhea.  Blood pressure and sugar is controlled.  Has urinary incontinence and following with Dr. Phillip.  Going for ureteral stent exchange later this month.    Had ureteral stent exchanged 9/2023.  Pts right knee is better after knee injection by ortho.  Still suffers from intermittent diarrhea and recently constipation.

## 2024-07-23 ENCOUNTER — NON-APPOINTMENT (OUTPATIENT)
Age: 74
End: 2024-07-23

## 2024-07-23 LAB
BKV DNA SPEC QL NAA+PROBE: NOT DETECTED IU/ML
CMV DNA SPEC QL NAA+PROBE: ABNORMAL IU/ML
CMVPCR LOG: ABNORMAL LOG10IU/ML

## 2024-07-29 ENCOUNTER — APPOINTMENT (OUTPATIENT)
Dept: COLORECTAL SURGERY | Facility: CLINIC | Age: 74
End: 2024-07-29
Payer: MEDICARE

## 2024-07-29 DIAGNOSIS — K60.2 ANAL FISSURE, UNSPECIFIED: ICD-10-CM

## 2024-07-29 PROCEDURE — 99212 OFFICE O/P EST SF 10 MIN: CPT

## 2024-07-29 NOTE — ASSESSMENT
[FreeTextEntry1] : 73 M with recurrent anal fissure.  Chronic radiation changes to skin.  Sitz baths / fiber suppl

## 2024-07-29 NOTE — PHYSICAL EXAM
[Abdomen Masses] : No abdominal masses [Tender] : nontender [Normal rectal exam] : exam was normal [Posterior] : posteriorly [Excoriation] : no perianal excoriation [Multiple Sinus Tracts] : no perianal sinus tracts [Fistula] : no fistulas [Wart] : no warts [Ulcer ___ cm] : no ulcers [Pilonidal Cyst] : no pilonidal cysts [Pilonidal Sinus] : no pilonidal sinus [Pilonidal Sinus Draining] : no pilonidal sinus drainage [Tender, Swollen] : nontender, non-swollen [Normal] : was normal [Normal Breath Sounds] : Normal breath sounds [Wheezing] : no wheezing was heard [Normal Heart Sounds] : normal heart sounds [Normal Rate and Rhythm] : normal rate and rhythm [Alert] : alert [Oriented to Person] : oriented to person [Oriented to Place] : oriented to place [Oriented to Time] : oriented to time [Calm] : calm [de-identified] : benign [de-identified] : NAD [de-identified] : posterior midline anal fissure [de-identified] : TRAVIS [de-identified] : FROM

## 2024-08-12 ENCOUNTER — RX RENEWAL (OUTPATIENT)
Age: 74
End: 2024-08-12

## 2024-09-01 NOTE — DISCHARGE NOTE NURSING/CASE MANAGEMENT/SOCIAL WORK - NSPROEXTENSIONSOFSELF_GEN_A_NUR
Pt presents to triage for mouth pain. Pt was cleaning their mouth and went too far back in her mouth with toothbrush. Pt states she felt a pop and now her throat feels tight. Pt appears anxious.  
none

## 2024-09-16 NOTE — PATIENT PROFILE ADULT - BRADEN MOISTURE
Writer replied to patient via [employer].  RHONA MullinsN, RN (she/her)  [facility] RN     (4) rarely moist

## 2024-09-16 NOTE — ED ADULT NURSE NOTE - FINAL NURSING ELECTRONIC SIGNATURE
Physical Therapy    Visit Type: initial evaluation  SUBJECTIVE  Patient agreed to participate in therapy this date.  RN in agreement to work with patient for therapy session.  \"I can't, I have no strength\"  Prior treatment in the past year for same condition: no therapies  Patient has not been hospitalized, in a skilled nursing facility, or seen by home health in the last 30 days.  Patient / Family Goal: maximize function    Pain   Patient denies pain.     OBJECTIVE     Cognitive Status   Level of Consciousness   - drowsy  Affect/Behavior    - calm and cooperative     Range of Motion (ROM)   (degrees unless noted; active unless noted; norms in ( ); negative=lacking to 0, positive=beyond 0)  WFL: LLE, RLE    Strength  (out of 5 unless noted, standard test position unless noted)   Comments / Details: Grossly 3+/5      Sitting Balance  (JR = base of support)  Static      - Trial 1 details: supervision    Standing Balance  (JR = base of support)  Firm Surface: Double Leg      - Static, Eyes Open       - Trial 1 (sec): 10       - Trial 1 details: moderate assist  Able to tolerate 2 reps of standing  Only able to tolerate standing for short period of time, unsteady in standing and needed to return quickly to sitting for safety       Bed Mobility  - Supine to sit: moderate assist  - Sit to supine: moderate assist  With head of bed elevated  Transfers  Assistive devices: gait belt, 2-wheeled walker  - Sit to stand: moderate assist  - Stand to sit: moderate assist    Ambulation / Gait  - Assist Level: not attempted due to not medically appropriate or safe     Interventions     Standing    Lower Extremity: Bilateral: marching, SOPHY, 5 reps, 2 sets  Training provided: activity tolerance, balance retraining, bed mobility training, safety training, use of assistive device and transfer training  Educated on walker us and transfer  Skilled input: Verbal instruction/cues  Verbal Consent: Writer verbally educated and received  verbal consent for hand placement, positioning of patient, and techniques to be performed today from patient for clothing adjustments for techniques as described above and how they are pertinent to the patient's plan of care.         Education:   - Present and ready to learn: patient  Education provided during session:  - Results of above outlined education: Needs reinforcement    ASSESSMENT   Patient will benefit from skilled therapy to address listed impairments and functional limitations.  Interferring components: decreased activity tolerance    Discharge needs based on today's assessment:   - Current level of function: significantly below baseline level of function   - Therapy needs at discharge: therapy 5 or more times per week   - Activities of daily living (ADLs) requiring support at discharge: bed mobility, transfers and ambulation   - Impairments that require further therapy intervention: activity tolerance, balance, ROM, strength and safety awareness    AM-PAC  - Generalized Prior Level of Function: IND/MOD I ([facility] 22-24)       Key: MOD A=moderate assistance, IND/MOD I=independent/modified independent  - Generalized Current Level of Function     - Current Mobility Score: 8       AM-PAC Scoring Key= >21 Modified Independent; 20-21 Supervision; 18-19 Minimal assist; 13-17 Moderate assist; 9-12 Max assist; <9 Total assist  Therapy Diagnosis:  Other Abnormalities of Gait and Mobility          Predicted patient presentation: Moderate (evolving) - Patient comorbidities and complexities, as defined above, may have varying impact on steady progress for prescribed plan of care.    PLAN (while hospitalized)  Suggestions for next session as indicated: A minimum of 8 minutes per session x 1 week in the acute setting.    PT Frequency: 3-5 x per week         Interventions: balance, bed mobility, endurance training, functional transfer training, safety education, strengthening, ROM and gait training  Agreement to plan  and goals: patient agrees with goals and treatment plan        GOALS  Review Date: 9/16/2024  Long Term Goals: (to be met by time of discharge from hospital)  Sit to supine: Patient will complete sit to supine minimal assist.  Supine to sit: Patient will complete supine to sit minimal assist.  Sit to stand: Patient will complete sit to stand transfer with gait belt and 2-wheeled walker, minimal assist.   Stand to sit: Patient will complete stand to sit transfer with gait belt and 2-wheeled walker, minimal assist.   Ambulation (even): Patient will ambulate on even surface for 15 feet with gait belt and 2-wheeled walker, minimal assist.   Documented in the chart in the following areas: Prior Level of Function. Assessment/Plan.    Admitting diagnosis: Weakness [R53.1]     Co-morbidities and problem list:   Patient Active Problem List:     Weakness        The referring provider's electronic signature on the evaluation authorizes the therapy plan of care and certifies the need for these services, furnished under this plan of care while under their care.      Patient at End of Session:   Location: in bed  Safety measures: alarm system in place/re-engaged, bed rails x3, call light within reach, equipment intact and lines intact  Handoff to: nurse      Therapy procedure time and total treatment time can be found documented on the Time Entry flowsheet   23-Jul-2022 16:46

## 2024-10-03 NOTE — ASU PREOP CHECKLIST - ALLERGY BAND ON
S:  62 year old male is being evaluated in the acute care setting with complaint of a warm/flushed sensation over this chest and face as well as nausea.  Received appropriate premedications prior to his C16 chemotherapy - oxaliplatin being administered. When chemotherapy held, symptoms abated, prior to my arrival.      Denies rigors, chest/abdomina/back pain, dyspnea, rash and pruritus.    O:  WDWNM, NAD  HEENT - no edema, erythema, aphasia  Neck - no edema, normal AROM  Lungs - non labored breathing, CTA  Cor - RRR    A:  1.  Stage IV gastric cancer  2.  Infusion reaction to oxaliplatin    P:  1.  On cycle 16 FOLFOX/Trastuzumab  2.  Symptoms abated with stopping the Oxaliplatin infusion, prior to my arrival.  Famotidine 20 mg IVP to complete treatment  3.  Call prn  4.  Keep originally scheduled appointments.   
no known allergies

## 2024-10-04 NOTE — ED ADULT TRIAGE NOTE - PAIN RATING/NUMBER SCALE (0-10): REST
Patient here today for influenza vaccine.  Patient tolerated injection and left clinic with no incident.  
4

## 2024-10-09 ENCOUNTER — RX RENEWAL (OUTPATIENT)
Age: 74
End: 2024-10-09

## 2024-10-21 ENCOUNTER — APPOINTMENT (OUTPATIENT)
Dept: NEPHROLOGY | Facility: CLINIC | Age: 74
End: 2024-10-21
Payer: MEDICARE

## 2024-10-21 VITALS
OXYGEN SATURATION: 98 % | TEMPERATURE: 97.2 F | SYSTOLIC BLOOD PRESSURE: 135 MMHG | WEIGHT: 198 LBS | BODY MASS INDEX: 28.35 KG/M2 | HEIGHT: 70 IN | DIASTOLIC BLOOD PRESSURE: 73 MMHG | HEART RATE: 97 BPM

## 2024-10-21 DIAGNOSIS — I10 ESSENTIAL (PRIMARY) HYPERTENSION: ICD-10-CM

## 2024-10-21 DIAGNOSIS — Z94.0 KIDNEY TRANSPLANT STATUS: ICD-10-CM

## 2024-10-21 DIAGNOSIS — Z79.899 OTHER LONG TERM (CURRENT) DRUG THERAPY: ICD-10-CM

## 2024-10-21 LAB
ALBUMIN SERPL ELPH-MCNC: 3.5 G/DL
ALP BLD-CCNC: 89 U/L
ALT SERPL-CCNC: 28 U/L
ANION GAP SERPL CALC-SCNC: 14 MMOL/L
APPEARANCE: ABNORMAL
AST SERPL-CCNC: 23 U/L
BACTERIA: ABNORMAL /HPF
BASOPHILS # BLD AUTO: 0.02 K/UL
BASOPHILS NFR BLD AUTO: 0.2 %
BILIRUB SERPL-MCNC: 0.3 MG/DL
BILIRUBIN URINE: NEGATIVE
BLOOD URINE: ABNORMAL
BUN SERPL-MCNC: 41 MG/DL
CALCIUM SERPL-MCNC: 9.7 MG/DL
CALCIUM SERPL-MCNC: 9.7 MG/DL
CAST: NORMAL /LPF
CHLORIDE SERPL-SCNC: 104 MMOL/L
CO2 SERPL-SCNC: 22 MMOL/L
COLOR: YELLOW
CREAT SERPL-MCNC: 3.02 MG/DL
CREAT SPEC-SCNC: 151 MG/DL
CREAT/PROT UR: 1.2 RATIO
EGFR: 21 ML/MIN/1.73M2
EOSINOPHIL # BLD AUTO: 0.1 K/UL
EOSINOPHIL NFR BLD AUTO: 1.1 %
EPITHELIAL CELLS: 4 /HPF
GLUCOSE QUALITATIVE U: NEGATIVE MG/DL
GLUCOSE SERPL-MCNC: 211 MG/DL
HCT VFR BLD CALC: 31.2 %
HGB BLD-MCNC: 9.9 G/DL
IMM GRANULOCYTES NFR BLD AUTO: 1.4 %
KETONES URINE: NEGATIVE MG/DL
LEUKOCYTE ESTERASE URINE: ABNORMAL
LYMPHOCYTES # BLD AUTO: 2.19 K/UL
LYMPHOCYTES NFR BLD AUTO: 23.7 %
MAGNESIUM SERPL-MCNC: 2 MG/DL
MAN DIFF?: NORMAL
MCHC RBC-ENTMCNC: 28.5 PG
MCHC RBC-ENTMCNC: 31.7 GM/DL
MCV RBC AUTO: 89.9 FL
MONOCYTES # BLD AUTO: 0.71 K/UL
MONOCYTES NFR BLD AUTO: 7.7 %
NEUTROPHILS # BLD AUTO: 6.11 K/UL
NEUTROPHILS NFR BLD AUTO: 65.9 %
NITRITE URINE: NEGATIVE
PARATHYROID HORMONE INTACT: 51 PG/ML
PH URINE: 6
PHOSPHATE SERPL-MCNC: 4 MG/DL
PLATELET # BLD AUTO: 300 K/UL
POTASSIUM SERPL-SCNC: 5 MMOL/L
PROT SERPL-MCNC: 6.5 G/DL
PROT UR-MCNC: 178 MG/DL
PROTEIN URINE: 300 MG/DL
RBC # BLD: 3.47 M/UL
RBC # FLD: 14.1 %
RED BLOOD CELLS URINE: 17 /HPF
REVIEW: NORMAL
SODIUM SERPL-SCNC: 139 MMOL/L
SPECIFIC GRAVITY URINE: 1.01
TACROLIMUS SERPL-MCNC: 6.4 NG/ML
UROBILINOGEN URINE: 0.2 MG/DL
WBC # FLD AUTO: 9.26 K/UL
WHITE BLOOD CELLS URINE: >998 /HPF

## 2024-10-21 PROCEDURE — 99214 OFFICE O/P EST MOD 30 MIN: CPT

## 2024-10-22 LAB
CMV DNA SPEC QL NAA+PROBE: NOT DETECTED IU/ML
CMVPCR LOG: NOT DETECTED LOG10IU/ML

## 2024-10-24 LAB — BKV DNA SPEC QL NAA+PROBE: NOT DETECTED IU/ML

## 2024-11-04 ENCOUNTER — NON-APPOINTMENT (OUTPATIENT)
Age: 74
End: 2024-11-04

## 2024-11-04 ENCOUNTER — APPOINTMENT (OUTPATIENT)
Dept: TRANSPLANT | Facility: CLINIC | Age: 74
End: 2024-11-04

## 2024-11-04 NOTE — ED ADULT TRIAGE NOTE - NSWEIGHTCALCTOOLDRUG_GEN_A_CORE
Bed: 18  Expected date: 11/4/24  Expected time:   Means of arrival:   Comments:  1 st triage     used

## 2024-11-18 ENCOUNTER — TRANSCRIPTION ENCOUNTER (OUTPATIENT)
Age: 74
End: 2024-11-18

## 2024-11-20 ENCOUNTER — TRANSCRIPTION ENCOUNTER (OUTPATIENT)
Age: 74
End: 2024-11-20

## 2024-11-20 RX ORDER — MYCOPHENOLATE MOFETIL 500 MG/1
500 TABLET ORAL
Qty: 60 | Refills: 11 | Status: ACTIVE | COMMUNITY
Start: 2024-11-20 | End: 1900-01-01

## 2024-11-27 ENCOUNTER — RX RENEWAL (OUTPATIENT)
Age: 74
End: 2024-11-27

## 2024-11-29 NOTE — PROVIDER CONTACT NOTE (OTHER) - ASSESSMENT
Pt AO x4, VSS. Pt with no c/o of any type of distress or pain. Pt is planning to be d/c tomorrow. Never

## 2024-12-17 ENCOUNTER — INPATIENT (INPATIENT)
Facility: HOSPITAL | Age: 74
LOS: 6 days | Discharge: HOME | DRG: 684 | End: 2024-12-24
Attending: HOSPITALIST | Admitting: STUDENT IN AN ORGANIZED HEALTH CARE EDUCATION/TRAINING PROGRAM
Payer: MEDICARE

## 2024-12-17 VITALS
TEMPERATURE: 99 F | DIASTOLIC BLOOD PRESSURE: 77 MMHG | HEART RATE: 114 BPM | RESPIRATION RATE: 20 BRPM | SYSTOLIC BLOOD PRESSURE: 144 MMHG | HEIGHT: 70 IN | WEIGHT: 205.03 LBS | OXYGEN SATURATION: 98 %

## 2024-12-17 DIAGNOSIS — Z94.0 KIDNEY TRANSPLANT STATUS: Chronic | ICD-10-CM

## 2024-12-17 DIAGNOSIS — Z96.652 PRESENCE OF LEFT ARTIFICIAL KNEE JOINT: Chronic | ICD-10-CM

## 2024-12-17 DIAGNOSIS — Z98.890 OTHER SPECIFIED POSTPROCEDURAL STATES: Chronic | ICD-10-CM

## 2024-12-17 DIAGNOSIS — I77.0 ARTERIOVENOUS FISTULA, ACQUIRED: Chronic | ICD-10-CM

## 2024-12-17 DIAGNOSIS — Z90.5 ACQUIRED ABSENCE OF KIDNEY: Chronic | ICD-10-CM

## 2024-12-17 DIAGNOSIS — A63.0 ANOGENITAL (VENEREAL) WARTS: Chronic | ICD-10-CM

## 2024-12-17 DIAGNOSIS — Z90.79 ACQUIRED ABSENCE OF OTHER GENITAL ORGAN(S): Chronic | ICD-10-CM

## 2024-12-17 LAB
ALBUMIN SERPL ELPH-MCNC: 3.8 G/DL — SIGNIFICANT CHANGE UP (ref 3.3–5)
ALP SERPL-CCNC: 88 U/L — SIGNIFICANT CHANGE UP (ref 40–120)
ALT FLD-CCNC: 8 U/L — LOW (ref 10–45)
ANION GAP SERPL CALC-SCNC: 13 MMOL/L — SIGNIFICANT CHANGE UP (ref 5–17)
APTT BLD: 34.6 SEC — SIGNIFICANT CHANGE UP (ref 24.5–35.6)
AST SERPL-CCNC: 19 U/L — SIGNIFICANT CHANGE UP (ref 10–40)
BASOPHILS # BLD AUTO: 0.01 K/UL — SIGNIFICANT CHANGE UP (ref 0–0.2)
BASOPHILS NFR BLD AUTO: 0.1 % — SIGNIFICANT CHANGE UP (ref 0–2)
BILIRUB SERPL-MCNC: 0.2 MG/DL — SIGNIFICANT CHANGE UP (ref 0.2–1.2)
BUN SERPL-MCNC: 28 MG/DL — HIGH (ref 7–23)
CALCIUM SERPL-MCNC: 9.8 MG/DL — SIGNIFICANT CHANGE UP (ref 8.4–10.5)
CHLORIDE SERPL-SCNC: 101 MMOL/L — SIGNIFICANT CHANGE UP (ref 96–108)
CO2 SERPL-SCNC: 24 MMOL/L — SIGNIFICANT CHANGE UP (ref 22–31)
CREAT SERPL-MCNC: 3.43 MG/DL — HIGH (ref 0.5–1.3)
EGFR: 18 ML/MIN/1.73M2 — LOW
EOSINOPHIL # BLD AUTO: 0.01 K/UL — SIGNIFICANT CHANGE UP (ref 0–0.5)
EOSINOPHIL NFR BLD AUTO: 0.1 % — SIGNIFICANT CHANGE UP (ref 0–6)
GLUCOSE SERPL-MCNC: 238 MG/DL — HIGH (ref 70–99)
HCT VFR BLD CALC: 34.1 % — LOW (ref 39–50)
HGB BLD-MCNC: 11.1 G/DL — LOW (ref 13–17)
IMM GRANULOCYTES NFR BLD AUTO: 0.6 % — SIGNIFICANT CHANGE UP (ref 0–0.9)
INR BLD: 0.9 RATIO — SIGNIFICANT CHANGE UP (ref 0.85–1.16)
LYMPHOCYTES # BLD AUTO: 2.07 K/UL — SIGNIFICANT CHANGE UP (ref 1–3.3)
LYMPHOCYTES # BLD AUTO: 29 % — SIGNIFICANT CHANGE UP (ref 13–44)
MCHC RBC-ENTMCNC: 29.1 PG — SIGNIFICANT CHANGE UP (ref 27–34)
MCHC RBC-ENTMCNC: 32.6 G/DL — SIGNIFICANT CHANGE UP (ref 32–36)
MCV RBC AUTO: 89.3 FL — SIGNIFICANT CHANGE UP (ref 80–100)
MONOCYTES # BLD AUTO: 0.41 K/UL — SIGNIFICANT CHANGE UP (ref 0–0.9)
MONOCYTES NFR BLD AUTO: 5.7 % — SIGNIFICANT CHANGE UP (ref 2–14)
NEUTROPHILS # BLD AUTO: 4.6 K/UL — SIGNIFICANT CHANGE UP (ref 1.8–7.4)
NEUTROPHILS NFR BLD AUTO: 64.5 % — SIGNIFICANT CHANGE UP (ref 43–77)
NRBC # BLD: 0 /100 WBCS — SIGNIFICANT CHANGE UP (ref 0–0)
PLATELET # BLD AUTO: 168 K/UL — SIGNIFICANT CHANGE UP (ref 150–400)
POTASSIUM SERPL-MCNC: 5 MMOL/L — SIGNIFICANT CHANGE UP (ref 3.5–5.3)
POTASSIUM SERPL-SCNC: 5 MMOL/L — SIGNIFICANT CHANGE UP (ref 3.5–5.3)
PROT SERPL-MCNC: 6.7 G/DL — SIGNIFICANT CHANGE UP (ref 6–8.3)
PROTHROM AB SERPL-ACNC: 10.4 SEC — SIGNIFICANT CHANGE UP (ref 9.9–13.4)
RBC # BLD: 3.82 M/UL — LOW (ref 4.2–5.8)
RBC # FLD: 14.3 % — SIGNIFICANT CHANGE UP (ref 10.3–14.5)
SODIUM SERPL-SCNC: 138 MMOL/L — SIGNIFICANT CHANGE UP (ref 135–145)
WBC # BLD: 7.14 K/UL — SIGNIFICANT CHANGE UP (ref 3.8–10.5)
WBC # FLD AUTO: 7.14 K/UL — SIGNIFICANT CHANGE UP (ref 3.8–10.5)

## 2024-12-17 PROCEDURE — 93971 EXTREMITY STUDY: CPT | Mod: 26,LT

## 2024-12-17 PROCEDURE — 99285 EMERGENCY DEPT VISIT HI MDM: CPT | Mod: GC

## 2024-12-17 NOTE — ED PROVIDER NOTE - PHYSICAL EXAMINATION
Emmy Cardozo DO (PGY2)   Physical Exam:    Gen: NAD, AOx3, non-toxic appearing  Lung: CTAB, no respiratory distress, no wheezes/rhonchi/rales B/L  CV: RRR, no murmurs, rubs or gallops  MSK: LUE diffusely edematous compared to R arm. Fistula in LUE with palpable thrill. Intact distal pulses and sensation

## 2024-12-17 NOTE — ED PROVIDER NOTE - OBJECTIVE STATEMENT
75 yo M pmhx HTN, T2DM,Hep C, ESRD and kidney and prostate cancer s/p chemo, kidney transplant recipient  in 2018 and Left TKR presents for LUE swelling that began 4 days prior. Patient was seen by his oncologist today (Dr Han [facility]) who recommended he present to the ED. Denies fever, chills, chest pain, shortness of breath, abd pain, nausea, vomiting, hematuria, dysuria, or any other symptoms at this time. Emmy Cardozo DO (PGY-2)

## 2024-12-17 NOTE — ED ADULT NURSE NOTE - NSICDXPASTSURGICALHX_GEN_ALL_CORE_FT
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 s/p Reversal    Kidney transplant recipient 2018  @ [facility] :  +  Hep C Donor    S/P anal fissurectomy and chemical denervation  with biopsy and fulguration of anal lesions, 2021    S/p nephrectomy left 9/15/2015   + Cancer    S/p nephrectomy right 12/10/2015   benign    S/P right knee arthroscopy     S/P total knee replacement, left     S/P TURP (transurethral resection of prostate) and Transplant Kidney Stent Replacemernt, 21

## 2024-12-17 NOTE — ED ADULT TRIAGE NOTE - MEANS OF ARRIVAL
Advised spouse Sofia(on hipaa) of Dr. Bethany rTejo note. Spouse verbalized understanding. ambulatory

## 2024-12-17 NOTE — ED PROVIDER NOTE - PROGRESS NOTE DETAILS
Spoke with Rads, no occlusion or stenosis of AVF seen on va duplex LUE. These findings were not reported in final read of duplex study however report to be addended by attending tomorrow. Documentation regarding fistula available for review in PACS, last image. Vascular aware and will come eval. Kristan Mcclendon PA-C No vascular intervention indicated at this time. Pt to be admitted for CHELA and nephro eval today. Accepted by hospitalist. Kristan Mcclendon PA-C Pt received at sign out, pending LUE DVT study. Denies LUE pain. AVF with palpable thrill. Has not used since 2018. Kristan Mcclendon PA-C Spoke with renal, aware of CHELA compared to creatinine from 11/2024 (in [facility]). Recommending UA/ucx, ultrasound transplant kidney w/ bladder and vascular cs for LUE swelling to eval for stenosis/occlusion. Kristan Mcclendon PA-C

## 2024-12-17 NOTE — ED ADULT NURSE NOTE - NSSEPSISSUSPECTED_ED_A_ED
Pt called ,dme had not contacted her, I am faxing to [facility] because [facility] no longer accepts her insurance   No

## 2024-12-17 NOTE — ED PROVIDER NOTE - ATTENDING CONTRIBUTION TO CARE
75 yo M pmhx HTN, T2DM,Hep C, ESRD and kidney and prostate cancer s/p chemo, kidney transplant recipient  in 2018 and Left TKR presents for LUE swelling that began 4 days prior.  Patient denies any trauma any skin changes any pain any difficulty breathing but the arm is significantly swollen compared to the right 1 states that he has had issues with a right arm swelling several years ago which improved on its own and has no history of prior DVTs no history of trauma full range of motion ultrasound ordered rule out DVT check labs no current chemo at this time

## 2024-12-17 NOTE — ED PROVIDER NOTE - NSICDXPASTSURGICALHX_GEN_ALL_CORE_FT
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 s/p Reversal    Kidney transplant recipient 2018  @ [facility] :  +  Hep C Donor    S/P anal fissurectomy and chemical denervation  with biopsy and fulguration of anal lesions, 2021    S/p nephrectomy left 9/15/2015   + Cancer    S/p nephrectomy right 12/10/2015   benign    S/P right knee arthroscopy     S/P total knee replacement, left     S/P TURP (transurethral resection of prostate) and Transplant Kidney Stent Replacemernt, 21

## 2024-12-17 NOTE — ED PROVIDER NOTE - CLINICAL SUMMARY MEDICAL DECISION MAKING FREE TEXT BOX
75 yo M pmhx HTN, T2DM,Hep C, ESRD and kidney and prostate cancer s/p chemo, kidney transplant recipient  in 2018 and Left TKR presents for LUE swelling that began 4 days prior. Otherwise vitally stable and well appearing. Will for DVT. Dispo pending results. Emmy Cardozo DO (PGY-2)

## 2024-12-17 NOTE — ED PROVIDER NOTE - CARE PLAN
1 Principal Discharge DX:	Left arm swelling   Principal Discharge DX:	CHELA (acute kidney injury)  Secondary Diagnosis:	Left arm swelling  Secondary Diagnosis:	Renal transplant recipient

## 2024-12-17 NOTE — ED ADULT NURSE NOTE - OBJECTIVE STATEMENT
74 year old male presented to ED with c/o of left arm swelling x4 days, hx renal transplant. pt denies CP, SOB, nausea/vomiting, numbness/tingling, fever, cough, chills, dizziness, headache, blurred vision, neuro intact. pt a&ox3, lung sounds clear, heart rate regular, abdomen soft nontender nondistended to palp. skin intact except swelling to left arm. IV in RAC 20G and patent. Will continue to monitor and assess while offering support and reassurance.

## 2024-12-18 DIAGNOSIS — M79.89 OTHER SPECIFIED SOFT TISSUE DISORDERS: ICD-10-CM

## 2024-12-18 DIAGNOSIS — M10.9 GOUT, UNSPECIFIED: ICD-10-CM

## 2024-12-18 DIAGNOSIS — Z94.0 KIDNEY TRANSPLANT STATUS: ICD-10-CM

## 2024-12-18 DIAGNOSIS — N17.9 ACUTE KIDNEY FAILURE, UNSPECIFIED: ICD-10-CM

## 2024-12-18 DIAGNOSIS — Z29.9 ENCOUNTER FOR PROPHYLACTIC MEASURES, UNSPECIFIED: ICD-10-CM

## 2024-12-18 DIAGNOSIS — D63.8 ANEMIA IN OTHER CHRONIC DISEASES CLASSIFIED ELSEWHERE: ICD-10-CM

## 2024-12-18 DIAGNOSIS — I10 ESSENTIAL (PRIMARY) HYPERTENSION: ICD-10-CM

## 2024-12-18 DIAGNOSIS — E11.9 TYPE 2 DIABETES MELLITUS WITHOUT COMPLICATIONS: ICD-10-CM

## 2024-12-18 DIAGNOSIS — E78.5 HYPERLIPIDEMIA, UNSPECIFIED: ICD-10-CM

## 2024-12-18 LAB
ALBUMIN SERPL ELPH-MCNC: 3.2 G/DL — LOW (ref 3.3–5)
ALP SERPL-CCNC: 72 U/L — SIGNIFICANT CHANGE UP (ref 40–120)
ALT FLD-CCNC: 9 U/L — LOW (ref 10–45)
ANION GAP SERPL CALC-SCNC: 12 MMOL/L — SIGNIFICANT CHANGE UP (ref 5–17)
APPEARANCE UR: ABNORMAL
APTT BLD: 27.4 SEC — SIGNIFICANT CHANGE UP (ref 24.5–35.6)
AST SERPL-CCNC: 20 U/L — SIGNIFICANT CHANGE UP (ref 10–40)
BACTERIA # UR AUTO: ABNORMAL /HPF
BILIRUB SERPL-MCNC: 0.3 MG/DL — SIGNIFICANT CHANGE UP (ref 0.2–1.2)
BILIRUB UR-MCNC: NEGATIVE — SIGNIFICANT CHANGE UP
BUN SERPL-MCNC: 28 MG/DL — HIGH (ref 7–23)
CALCIUM SERPL-MCNC: 9.7 MG/DL — SIGNIFICANT CHANGE UP (ref 8.4–10.5)
CAST: 0 /LPF — SIGNIFICANT CHANGE UP (ref 0–4)
CHLORIDE SERPL-SCNC: 105 MMOL/L — SIGNIFICANT CHANGE UP (ref 96–108)
CO2 SERPL-SCNC: 22 MMOL/L — SIGNIFICANT CHANGE UP (ref 22–31)
COLOR SPEC: YELLOW — SIGNIFICANT CHANGE UP
CREAT SERPL-MCNC: 3.14 MG/DL — HIGH (ref 0.5–1.3)
DIFF PNL FLD: ABNORMAL
EGFR: 20 ML/MIN/1.73M2 — LOW
GLUCOSE BLDC GLUCOMTR-MCNC: 122 MG/DL — HIGH (ref 70–99)
GLUCOSE BLDC GLUCOMTR-MCNC: 149 MG/DL — HIGH (ref 70–99)
GLUCOSE BLDC GLUCOMTR-MCNC: 237 MG/DL — HIGH (ref 70–99)
GLUCOSE BLDC GLUCOMTR-MCNC: 81 MG/DL — SIGNIFICANT CHANGE UP (ref 70–99)
GLUCOSE SERPL-MCNC: 74 MG/DL — SIGNIFICANT CHANGE UP (ref 70–99)
GLUCOSE UR QL: NEGATIVE MG/DL — SIGNIFICANT CHANGE UP
HCT VFR BLD CALC: 33.9 % — LOW (ref 39–50)
HGB BLD-MCNC: 10.2 G/DL — LOW (ref 13–17)
INR BLD: 0.96 RATIO — SIGNIFICANT CHANGE UP (ref 0.85–1.16)
KETONES UR-MCNC: NEGATIVE MG/DL — SIGNIFICANT CHANGE UP
LEUKOCYTE ESTERASE UR-ACNC: ABNORMAL
MCHC RBC-ENTMCNC: 28.9 PG — SIGNIFICANT CHANGE UP (ref 27–34)
MCHC RBC-ENTMCNC: 30.1 G/DL — LOW (ref 32–36)
MCV RBC AUTO: 96 FL — SIGNIFICANT CHANGE UP (ref 80–100)
NITRITE UR-MCNC: NEGATIVE — SIGNIFICANT CHANGE UP
NRBC # BLD: 0 /100 WBCS — SIGNIFICANT CHANGE UP (ref 0–0)
PH UR: 7 — SIGNIFICANT CHANGE UP (ref 5–8)
PLATELET # BLD AUTO: 141 K/UL — LOW (ref 150–400)
POTASSIUM SERPL-MCNC: 4.4 MMOL/L — SIGNIFICANT CHANGE UP (ref 3.5–5.3)
POTASSIUM SERPL-SCNC: 4.4 MMOL/L — SIGNIFICANT CHANGE UP (ref 3.5–5.3)
PROT SERPL-MCNC: 6.1 G/DL — SIGNIFICANT CHANGE UP (ref 6–8.3)
PROT UR-MCNC: 300 MG/DL
PROTHROM AB SERPL-ACNC: 11 SEC — SIGNIFICANT CHANGE UP (ref 9.9–13.4)
RBC # BLD: 3.53 M/UL — LOW (ref 4.2–5.8)
RBC # FLD: 14.5 % — SIGNIFICANT CHANGE UP (ref 10.3–14.5)
RBC CASTS # UR COMP ASSIST: 14 /HPF — HIGH (ref 0–4)
SODIUM SERPL-SCNC: 139 MMOL/L — SIGNIFICANT CHANGE UP (ref 135–145)
SP GR SPEC: 1.01 — SIGNIFICANT CHANGE UP (ref 1–1.03)
SQUAMOUS # UR AUTO: 0 /HPF — SIGNIFICANT CHANGE UP (ref 0–5)
TACROLIMUS SERPL-MCNC: 8.6 NG/ML — SIGNIFICANT CHANGE UP
UROBILINOGEN FLD QL: 0.2 MG/DL — SIGNIFICANT CHANGE UP (ref 0.2–1)
WBC # BLD: 5.19 K/UL — SIGNIFICANT CHANGE UP (ref 3.8–10.5)
WBC # FLD AUTO: 5.19 K/UL — SIGNIFICANT CHANGE UP (ref 3.8–10.5)
WBC UR QL: 281 /HPF — HIGH (ref 0–5)

## 2024-12-18 PROCEDURE — 76776 US EXAM K TRANSPL W/DOPPLER: CPT | Mod: 26,RT

## 2024-12-18 PROCEDURE — 99222 1ST HOSP IP/OBS MODERATE 55: CPT | Mod: GC

## 2024-12-18 PROCEDURE — 93990 DOPPLER FLOW TESTING: CPT | Mod: 26

## 2024-12-18 PROCEDURE — 99223 1ST HOSP IP/OBS HIGH 75: CPT

## 2024-12-18 PROCEDURE — 99222 1ST HOSP IP/OBS MODERATE 55: CPT | Mod: FS

## 2024-12-18 RX ORDER — CYANOCOBALAMIN 1000 UG/ML
1 INJECTION, SOLUTION INTRAMUSCULAR; SUBCUTANEOUS
Refills: 0 | DISCHARGE

## 2024-12-18 RX ORDER — SULFAMETHOXAZOLE/TRIMETHOPRIM 800-160 MG
1 TABLET ORAL DAILY
Refills: 0 | Status: DISCONTINUED | OUTPATIENT
Start: 2024-12-18 | End: 2024-12-24

## 2024-12-18 RX ORDER — SULFAMETHOXAZOLE/TRIMETHOPRIM 800-160 MG
1 TABLET ORAL
Refills: 0 | DISCHARGE

## 2024-12-18 RX ORDER — DEXTROSE MONOHYDRATE 25 G/50ML
25 INJECTION, SOLUTION INTRAVENOUS ONCE
Refills: 0 | Status: DISCONTINUED | OUTPATIENT
Start: 2024-12-18 | End: 2024-12-24

## 2024-12-18 RX ORDER — PYRIDOXINE HCL (VITAMIN B6) 100 MG
100 TABLET ORAL DAILY
Refills: 0 | Status: DISCONTINUED | OUTPATIENT
Start: 2024-12-18 | End: 2024-12-24

## 2024-12-18 RX ORDER — VITAMIN A 10000 UNIT
1 TABLET ORAL
Refills: 0 | DISCHARGE

## 2024-12-18 RX ORDER — SODIUM CHLORIDE 9 MG/ML
1000 INJECTION, SOLUTION INTRAVENOUS
Refills: 0 | Status: DISCONTINUED | OUTPATIENT
Start: 2024-12-18 | End: 2024-12-24

## 2024-12-18 RX ORDER — NIFEDIPINE 60 MG/1
1 TABLET, EXTENDED RELEASE ORAL
Refills: 0 | DISCHARGE

## 2024-12-18 RX ORDER — DEXTROSE MONOHYDRATE 25 G/50ML
15 INJECTION, SOLUTION INTRAVENOUS ONCE
Refills: 0 | Status: DISCONTINUED | OUTPATIENT
Start: 2024-12-18 | End: 2024-12-24

## 2024-12-18 RX ORDER — PYRIDOXINE HCL (VITAMIN B6) 100 MG
1 TABLET ORAL
Refills: 0 | DISCHARGE

## 2024-12-18 RX ORDER — HEPARIN SODIUM 1000 [USP'U]/ML
5000 INJECTION, SOLUTION INTRAVENOUS; SUBCUTANEOUS EVERY 8 HOURS
Refills: 0 | Status: DISCONTINUED | OUTPATIENT
Start: 2024-12-18 | End: 2024-12-24

## 2024-12-18 RX ORDER — INSULIN LISPRO 100/ML
VIAL (ML) SUBCUTANEOUS
Refills: 0 | Status: DISCONTINUED | OUTPATIENT
Start: 2024-12-18 | End: 2024-12-24

## 2024-12-18 RX ORDER — ATORVASTATIN CALCIUM 40 MG/1
10 TABLET, FILM COATED ORAL AT BEDTIME
Refills: 0 | Status: DISCONTINUED | OUTPATIENT
Start: 2024-12-18 | End: 2024-12-24

## 2024-12-18 RX ORDER — GINKGO BILOBA 40 MG
3 CAPSULE ORAL AT BEDTIME
Refills: 0 | Status: DISCONTINUED | OUTPATIENT
Start: 2024-12-18 | End: 2024-12-24

## 2024-12-18 RX ORDER — PREDNISONE 5 MG
5 TABLET ORAL DAILY
Refills: 0 | Status: DISCONTINUED | OUTPATIENT
Start: 2024-12-18 | End: 2024-12-24

## 2024-12-18 RX ORDER — NIACIN 750 MG
1000 TABLET, EXTENDED RELEASE 24 HR ORAL AT BEDTIME
Refills: 0 | Status: DISCONTINUED | OUTPATIENT
Start: 2024-12-18 | End: 2024-12-24

## 2024-12-18 RX ORDER — ATORVASTATIN CALCIUM 40 MG/1
1 TABLET, FILM COATED ORAL
Refills: 0 | DISCHARGE

## 2024-12-18 RX ORDER — METOPROLOL TARTRATE 50 MG
100 TABLET ORAL DAILY
Refills: 0 | Status: DISCONTINUED | OUTPATIENT
Start: 2024-12-18 | End: 2024-12-24

## 2024-12-18 RX ORDER — CITRIC ACID/SODIUM CITRATE 334-500MG
15 SOLUTION, ORAL ORAL DAILY
Refills: 0 | Status: DISCONTINUED | OUTPATIENT
Start: 2024-12-18 | End: 2024-12-24

## 2024-12-18 RX ORDER — DEXTROSE MONOHYDRATE 25 G/50ML
12.5 INJECTION, SOLUTION INTRAVENOUS ONCE
Refills: 0 | Status: DISCONTINUED | OUTPATIENT
Start: 2024-12-18 | End: 2024-12-24

## 2024-12-18 RX ORDER — ORAL SEMAGLUTIDE 3 MG/1
1 TABLET ORAL
Refills: 0 | DISCHARGE

## 2024-12-18 RX ORDER — TACROLIMUS 0.5 MG/1
4 CAPSULE ORAL DAILY
Refills: 0 | Status: DISCONTINUED | OUTPATIENT
Start: 2024-12-18 | End: 2024-12-20

## 2024-12-18 RX ORDER — VITAMIN A 10000 UNIT
1 TABLET ORAL DAILY
Refills: 0 | Status: DISCONTINUED | OUTPATIENT
Start: 2024-12-18 | End: 2024-12-24

## 2024-12-18 RX ORDER — MYCOPHENOLATE MOFETIL 250 MG/1
1 CAPSULE ORAL
Refills: 0 | DISCHARGE

## 2024-12-18 RX ORDER — GLUCAGON INJECTION, SOLUTION 0.5 MG/.1ML
1 INJECTION, SOLUTION SUBCUTANEOUS ONCE
Refills: 0 | Status: DISCONTINUED | OUTPATIENT
Start: 2024-12-18 | End: 2024-12-24

## 2024-12-18 RX ORDER — MYCOPHENOLATE MOFETIL 250 MG/1
500 CAPSULE ORAL
Refills: 0 | Status: DISCONTINUED | OUTPATIENT
Start: 2024-12-18 | End: 2024-12-24

## 2024-12-18 RX ORDER — NIFEDIPINE 60 MG/1
60 TABLET, EXTENDED RELEASE ORAL DAILY
Refills: 0 | Status: DISCONTINUED | OUTPATIENT
Start: 2024-12-18 | End: 2024-12-24

## 2024-12-18 RX ORDER — NIACIN 750 MG
1 TABLET, EXTENDED RELEASE 24 HR ORAL
Refills: 0 | DISCHARGE

## 2024-12-18 RX ORDER — ALLOPURINOL 100 MG/1
100 TABLET ORAL DAILY
Refills: 0 | Status: DISCONTINUED | OUTPATIENT
Start: 2024-12-18 | End: 2024-12-24

## 2024-12-18 RX ORDER — ACETAMINOPHEN 80 MG/.8ML
650 SOLUTION/ DROPS ORAL EVERY 6 HOURS
Refills: 0 | Status: DISCONTINUED | OUTPATIENT
Start: 2024-12-18 | End: 2024-12-24

## 2024-12-18 RX ORDER — CYANOCOBALAMIN 1000 UG/ML
1000 INJECTION, SOLUTION INTRAMUSCULAR; SUBCUTANEOUS DAILY
Refills: 0 | Status: DISCONTINUED | OUTPATIENT
Start: 2024-12-18 | End: 2024-12-24

## 2024-12-18 RX ADMIN — Medication 5 MILLIGRAM(S): at 05:56

## 2024-12-18 RX ADMIN — MYCOPHENOLATE MOFETIL 500 MILLIGRAM(S): 250 CAPSULE ORAL at 17:27

## 2024-12-18 RX ADMIN — Medication 100 MILLIGRAM(S): at 12:39

## 2024-12-18 RX ADMIN — Medication 15 MILLILITER(S): at 12:39

## 2024-12-18 RX ADMIN — Medication 1 TABLET(S): at 11:39

## 2024-12-18 RX ADMIN — ALLOPURINOL 100 MILLIGRAM(S): 100 TABLET ORAL at 11:39

## 2024-12-18 RX ADMIN — Medication 2: at 17:27

## 2024-12-18 RX ADMIN — ATORVASTATIN CALCIUM 10 MILLIGRAM(S): 40 TABLET, FILM COATED ORAL at 21:50

## 2024-12-18 RX ADMIN — Medication 1000 MILLIGRAM(S): at 21:51

## 2024-12-18 RX ADMIN — NIFEDIPINE 60 MILLIGRAM(S): 60 TABLET, EXTENDED RELEASE ORAL at 05:56

## 2024-12-18 RX ADMIN — TACROLIMUS 4 MILLIGRAM(S): 0.5 CAPSULE ORAL at 05:56

## 2024-12-18 RX ADMIN — HEPARIN SODIUM 5000 UNIT(S): 1000 INJECTION, SOLUTION INTRAVENOUS; SUBCUTANEOUS at 15:33

## 2024-12-18 RX ADMIN — CYANOCOBALAMIN 1000 MICROGRAM(S): 1000 INJECTION, SOLUTION INTRAMUSCULAR; SUBCUTANEOUS at 11:39

## 2024-12-18 RX ADMIN — HEPARIN SODIUM 5000 UNIT(S): 1000 INJECTION, SOLUTION INTRAVENOUS; SUBCUTANEOUS at 05:56

## 2024-12-18 RX ADMIN — HEPARIN SODIUM 5000 UNIT(S): 1000 INJECTION, SOLUTION INTRAVENOUS; SUBCUTANEOUS at 21:51

## 2024-12-18 RX ADMIN — Medication 1 MILLIGRAM(S): at 11:39

## 2024-12-18 RX ADMIN — MYCOPHENOLATE MOFETIL 500 MILLIGRAM(S): 250 CAPSULE ORAL at 05:56

## 2024-12-18 RX ADMIN — Medication 100 MILLIGRAM(S): at 05:56

## 2024-12-18 NOTE — H&P ADULT - PROBLEM SELECTOR PLAN 4
- On Rybelsus 14mg at home  - RABIA while admitted  - F/u HbA1c - On Glimepiride 2mg BID + Rybelsus 14mg at home  - RABIA while admitted  - F/u HbA1c

## 2024-12-18 NOTE — CONSULT NOTE ADULT - TIME BILLING
Kidney recipient with functioning allograft, elevated creatinine CKD  UE edema ?venous hypertension on access arm  Comorbidities reviewed  Medications including immunosuppression reviewed  Vascular evaluation , clinical, lab, imaging data reviewed.  Suggestions:  Continue current immunosuppression  Target Tacrolimus trough level as noted above  If creatinine up trends will do further evaluation including DSA, BK PCR  Will follow  I was present during and reviewed clinical and lab data as well as assessment and plan as documented by the housestaff as noted. Please contact if any additional questions with any change in clinical condition or on availability of any additional information or reports.

## 2024-12-18 NOTE — PATIENT PROFILE ADULT - FUNCTIONAL ASSESSMENT - BASIC MOBILITY 2.
Chronic Pain/PM&R Clinic Note     Encounter Date: 5/31/22    Subjective:   Chief Complaint:   Chief Complaint   Patient presents with    Follow Up After Procedure    Discuss Medications     Meloxicam was not helping, patient stopped taking it because it was upsetting her stomach       History of Present Illness:   Kevin Bach is a 39 y.o. female seen in the clinic initially on 04/11/2022 upon request from [place], [place]  for her history of low back pain. States she has had low back pain for several years but it has been getting significantly worse in the last year, especially since October 2021. Patient denies any prior events that may have caused aggravation of her pain. Patient states she tries to stay active but has been very difficult due to her pain level. She states she hears grinding in her right low back. She states pain is mainly located right low back radiating down her right leg to her right knee. She will occasionally get pain that shoots down to her right foot, this is dependent on how active she is. She has recently noticed some pain in her left low back radiating into her left leg. Patient states her pain is worse with increased activity and better at rest.  Patient states she uses a cane to help take the pressure off her back. When she is out shopping she has to use a cart to help take the pressure off her back. Patient currently walks with a cane outside of the home. She states she did fall last week when crouching down under a desk and lost her balance. She does state she feels like she is off balance frequently. Patient states she has trouble sleeping because she cannot lay in 1 position for very long without having pain. Patient states she will sometimes get pressure in her right hip and it feels like it is swollen. Patient states she did physical therapy a few years back which was ineffective and almost made things worse.   Patient denies any bowel/bladder incontinence or saddle anesthesia. Patient currently moved back here from [place] within the last few months and she has yet to get established with a family doctor. She currently lives at home with her 3 kids ranging from ages 14-13. She is currently working online to get her masters degree. 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 She currently has a ventriculoperitoneal shunt, but it is shut off. She also has a history of Chiari malformation and decompression. She was previously seeing a specialist in [place] but has not been there since 2019. Today, 5/31/2022, patient presents for planned follow-up on low back pain. Patient underwent a bilateral SI joint injection on 5/3/2022. She states she did not notice a huge amount of relief with this procedure. Although, she did state she has a lot less pressure in her right hip and she is able to sit for a longer period of time. She has noticed an increase in her midline low back pain since the procedure. Patient is questioning if we can repeat the procedure she had done in 2018, ordered by Dr. Tai Ross. She states this injection helped significantly with her low back and right leg pain. Patient states she stopped taking the meloxicam due to upset stomach and it also was ineffective. Patient denies any new symptoms such as focal leg weakness, new leg paresthesias, saddle anesthesia, bowel/bladder incontinence. History of Interventions:   Surgery: No previous lumbar surgeries  Injections:   Injection in office in 2018 for sciatica (Ordered by Dr. Tai Ross)   B/L SI joint injection (05/03/2022) - relief in SI pain (increased midline low back pain)    Current Treatment Medications:   Ibuprofen - relatively ineffective   Tylenol - ineffecitve     Historical Treatment Medications:   Flexeril - ineffective  Baclofen - ineffective   Gabapentin - helps a little.  100mg TID   Lyrica - s/e  Cymbalta - fibromyalgia, withdrawal was terrible  Amitriptyline - depression - bipolar - not recently. Zanaflex - ineffective   Mobic - some relief   Norco  Mobic - upset stomach, ineffective    Imaging:  CT Lumbar (01/20/2022)  Narrative   PROCEDURE: CT LUMBAR SPINE W CONTRAST       CLINICAL INFORMATION: DDD (degenerative disc disease), lumbosacral, Bilateral stenosis of lateral recess of lumbar spine, Lumbar radiculopathy.       COMPARISON: Intra-articular administration of contrast performed on the same day. Plain radiographs dated 21 March 2010. .       TECHNIQUE: 3 mm axial images were obtained of the lumbar spine following intrathecal administration of 18 mL of Omnipaque 180. Sagittal and coronal reconstructions were obtained. Angled images were reconstructed through the L3-4, L4-5 and L5-S1 disc    levels.       All CT scans at this facility use dose modulation, iterative reconstruction, and/or weight-based dosing when appropriate to reduce radiation dose to as low as reasonably achievable.       FINDINGS:               The lumbar vertebral bodies are normally aligned.  There are no compression fractures.  No pars defects are noted. There appears be an intraspinal catheter entering the spine at L4-5       There are no gross abnormalities within the spinal canal.       On the axial images, at T11-T12, T12-L1 and L1-L2, there is no significant disc herniation, canal or foraminal stenosis.       At L2-3, there is a 2.6 mm disc protrusion. This results in mild-to-moderate canal and bilateral foraminal stenosis.       At L3-4, there is no disc herniation, canal or foraminal stenosis.       At L4-5, there is a 4 mm disc protrusion and facet hypertrophy this results in moderate severe canal and bilateral foraminal stenosis.       At L5-S1, there is a 1.8 mm disc protrusion and facet hypertrophy.  This results in mild canal and moderate bilateral foraminal stenosis.       There is mild degenerative change involving the sacroiliac joints bilaterally       There is a 16 mm sclerotic lesion. In the left iliac crest.       There is a possible intraperitoneal catheter present.               Impression       1. Degenerative changes most marked at L4-5, at which level there is moderate to severe canal and bilateral foraminal stenosis. There appears to be an intraspinal catheter entering the spine at L4-5.   2. There is mild canal and moderate bilateral foraminal stenosis at L5-S1. .   3. There is mild-to-moderate canal and bilateral foraminal stenosis at L2-3.   4. There is degenerative change involving the sacroiliac joints bilaterally. 5. There is a 16 mm sclerotic lesion involving the left iliac crest.   6. There is a possible intraperitoneal catheter present. .                   **This report has been created using voice recognition software. It may contain minor errors which are inherent in voice recognition technology. **       Final report electronically signed by DR Molly Mccauley on 1/20/2022 11:55 AM         Past Medical History:   Diagnosis Date    ADHD (attention deficit hyperactivity disorder)     Bipolar disorder ([facility] [address]     Chiari malformation     Depression     Fibromyalgia     Pseudotumor cerebri        Past Surgical History:   Procedure Laterality Date    BACK INJECTION Bilateral 5/3/2022    bilateral sacroiliac joint injection.  performed by Scarlett De La Rosa DO at [address]  2007    OTHER SURGICAL HISTORY  2007    lumbar shunt/ shunt    OTHER SURGICAL HISTORY      chiari decompression    VENTRICULOPERITONEAL SHUNT         Family History   Problem Relation Age of Onset    Hypertension Mother     Alzheimer's Disease Father     Stroke Father     Heart Disease Father          Medications & Allergies:   Current Outpatient Medications   Medication Instructions    ibuprofen (ADVIL;MOTRIN) 800 mg, EVERY 8 HOURS PRN    traMADol (ULTRAM) 50 mg, Oral, 2 TIMES DAILY PRN       No Known Allergies    Review of Systems:   Constitutional: negative for weight changes or fevers  Genitourinary: negative for bowel/bladder incontinence   Musculoskeletal: positive for low back pain, right leg pain  Neurological: negative for any leg weakness or numbness/tingling  Behavioral/Psych: negative for anxiety/depression   All other systems reviewed and are negative    Objective:     Vitals:    05/31/22 0910   BP: 106/84       Constitutional: Pleasant, no acute distress   Head: Normocephalic, atraumatic   Eyes: Conjunctivae normal   Neck: Supple, symmetrical   Lungs: Normal respiratory effort, non-labored breathing   Cardiovascular: Limbs warm and well perfused   Abdomen: Non-protruded   Musculoskeletal: Muscle bulk symmetric, no atrophy, no gross deformities   · Lower Extremities: ROM WNL. · Thorax: No paraspinal tenderness bilaterally. No scoliosis or kyphosis. · Lumbar Spine: ROM WNL. Lumbar paraspinals mildly tender to palpation bilaterally. SLR neg bilaterally. ROSSY positive bilaterally. GAENSLEN positive bilaterally. Positive SI joint distraction bilaterally. Positive facet loading bilaterally. Bilateral SI joints non-tender to palpation. Bilateral greater trochanters non-tender to palpation. Neurological: Cranial nerves II-XII grossly intact. · Gait - Antalgic gait. Ambulates with assistive device (cane). · Motor: 3/5 muscle strength in bilateral hip flexion, knee flexion, knee extension, ankle dorsiflexion, and ankle plantar flexion (pain limited)  · Sensory: LT sensation intact in lower limbs, tingling through bilateral legs   · Reflexes: 2+ symmetrical in bilateral achilles, 2+ bilateral patellar, negative ankle clonus, downgoing babinski   Skin: No rashes or lesions present   Psychological: Cooperative, no exaggerated pain behaviors     Assessment:    Diagnosis Orders   1. Lumbar spondylosis  traMADol (ULTRAM) 50 MG tablet   2. Facet arthropathy     3.  Sacroiliac joint dysfunction of both sides 4. Neuropathic pain     5. Chronic pain syndrome     6. Radiculopathy, lumbosacral region         Vazquez Luna is a 39 y. o.female presenting to the pain clinic for evaluation of low back and right leg pain. Patient's history and physical consistent with bilateral sacroiliac joint dysfunction. I have set her up for a bilateral SI joint injection with Dr. Bety Deras. We discussed that she likely has several pain generators including lumbar radiculopathy and lumbar facet mediated pain. We discussed the potential of doing an SI ablation if she fails the SI injection or only receives a short amount of relief. We also briefly discussed the possibility of doing lumbar epidurals vs. facet injections in the future. Patient seems to have had a significant response from the bilateral SI joint injection she is now noticing an increase in her midline low back pain. We discussed the potential of doing a lumbar epidural steroid injection as she states this has helped her axial back pain and right leg pain in the past.  She had a lumbar epidural steroid injection at L4-5 with [facility] in the past.  I have set her up for a lumbar epidural steroid injection at L4-5 with Dr. Kaley Kwok. We also discussed the potential of pursuing lumbar facet medial branch blocks to RFA for her axial low back pain she has a failed response to the epidural.  I have stopped her Mobic and discussed starting her on on tramadol 50 mg twice daily as needed severe pain (>7/10). Medication will be used to get her through interventional procedures for reduction of pain as she has failed several other medications such as muscle relaxers, NSAIDs, and neuropathic agents. Notified a urine drug screen will be collected today. At that time patient admitted to [facility] use but denied other illegal drug use or alcohol use. Before staff was able to collect a urine drug screen the patient left the office without being discharged.  Josephine Pineda MA tried to contact the patient and left a voicemail for the patient to return her call or come back to the office. Mauricio Rivera called the pharmacy to cancel the Tramadol prescription. I will not be prescribing further medications. Patient will be procedures only if she continues to see me. Plan: The following treatment recommendations and plan were discussed in detail with Yana Vale. Imaging:   I have reviewed patients imaging of lumbar CT and results were discussed with patient today. Analgesics:   Patient is taking Acetaminophen. Patient informed that the maximum amount of acetaminophen taken on a regular basis should only be 4000 mg per day. I have stopped her Mobic due to s/e. Adjuvants:   None     Interventions: In presence of lumbosacral radiating pain, the option of lumbar epidural steroid injection approach at L4-5 was chosen. The risks and benefits were discussed in detail with the patient. Patient wants to proceed with the injection. Anticoagulation/NPO Recommendations:   Patient does not need to hold any medications prior to the procedure. Patient does not need to be NPO prior to the procedure. We will do a LOCAL injection    Multidisciplinary Pain Management:   In the presence of complex, chronic, and multi-factorial pain, the importance of a multidisciplinary approach to pain management in the patients management regimen was emphasized and discussed in great detail. PHYSICAL THERAPY: Patient is advised to see a physical therapist for gentle stretching exercises and conditioning exercises for management of pain. PSYCHOLOGY: Patient is advised to see a clinical pain psychologist, for the psychosocial aspect of pain care through coping skills, relaxation strategies, cognitive group therapy etc.     Referrals:  None    Prescriptions Written This Visit:   Tramadol sent then cancelled.      Follow-up: JUVENTINO L4-5, follow up 6 weeks after injection     LIBAN Tam - CNP 4 = No assist / stand by assistance

## 2024-12-18 NOTE — PROGRESS NOTE ADULT - SUBJECTIVE AND OBJECTIVE BOX
Patient is a 74y old  Male who presents with a chief complaint of LUE swelling (18 Dec 2024 02:29)      SUBJECTIVE / OVERNIGHT EVENTS: pt feels ok, denies cp, sob, abd pain. chills     MEDICATIONS  (STANDING):  allopurinol 100 milliGRAM(s) Oral daily  atorvastatin 10 milliGRAM(s) Oral at bedtime  citric acid/sodium citrate Solution 15 milliLiter(s) Oral daily  cyanocobalamin 1000 MICROGram(s) Oral daily  dextrose 5%. 1000 milliLiter(s) (50 mL/Hr) IV Continuous <Continuous>  dextrose 5%. 1000 milliLiter(s) (100 mL/Hr) IV Continuous <Continuous>  dextrose 50% Injectable 25 Gram(s) IV Push once  dextrose 50% Injectable 12.5 Gram(s) IV Push once  dextrose 50% Injectable 25 Gram(s) IV Push once  folic acid 1 milliGRAM(s) Oral daily  glucagon  Injectable 1 milliGRAM(s) IntraMuscular once  heparin   Injectable 5000 Unit(s) SubCutaneous every 8 hours  insulin lispro (ADMELOG) corrective regimen sliding scale   SubCutaneous three times a day before meals  metoprolol succinate  milliGRAM(s) Oral daily  mycophenolate mofetil 500 milliGRAM(s) Oral two times a day  niacin SR 1000 milliGRAM(s) Oral at bedtime  NIFEdipine XL 60 milliGRAM(s) Oral daily  predniSONE   Tablet 5 milliGRAM(s) Oral daily  pyridoxine 100 milliGRAM(s) Oral daily  tacrolimus ER Tablet (ENVARSUS XR) 4 milliGRAM(s) Oral daily  trimethoprim   80 mG/sulfamethoxazole 400 mG 1 Tablet(s) Oral daily    MEDICATIONS  (PRN):  acetaminophen     Tablet .. 650 milliGRAM(s) Oral every 6 hours PRN Temp greater or equal to 38C (100.4F), Mild Pain (1 - 3)  dextrose Oral Gel 15 Gram(s) Oral once PRN Blood Glucose LESS THAN 70 milliGRAM(s)/deciliter  melatonin 3 milliGRAM(s) Oral at bedtime PRN Insomnia        CAPILLARY BLOOD GLUCOSE      POCT Blood Glucose.: 149 mg/dL (18 Dec 2024 12:15)  POCT Blood Glucose.: 81 mg/dL (18 Dec 2024 07:55)    I&O's Summary      PHYSICAL EXAM:  GENERAL: NAD, well-developed  HEAD:  Atraumatic, Normocephalic  EYES: EOMI, PERRLA, conjunctiva and sclera clear  NECK: Supple, No JVD  CHEST/LUNG: Clear to auscultation bilaterally; No wheeze  HEART: Regular rate and rhythm; No murmurs, rubs, or gallops  ABDOMEN: Soft, Nontender, Nondistended; Bowel sounds present  EXTREMITIES:  2+ Peripheral Pulses, LUE AVF  PSYCH: AAOx3  NEUROLOGY: non-focal  SKIN: No rashes or lesions    LABS:                        10.2   5.19  )-----------( 141      ( 18 Dec 2024 05:54 )             33.9     12-18    139  |  105  |  28[H]  ----------------------------<  74  4.4   |  22  |  3.14[H]    Ca    9.7      18 Dec 2024 05:54    TPro  6.1  /  Alb  3.2[L]  /  TBili  0.3  /  DBili  x   /  AST  20  /  ALT  9[L]  /  AlkPhos  72  12-18    PT/INR - ( 18 Dec 2024 05:54 )   PT: 11.0 sec;   INR: 0.96 ratio         PTT - ( 18 Dec 2024 05:54 )  PTT:27.4 sec      Urinalysis Basic - ( 18 Dec 2024 05:54 )    Color: x / Appearance: x / SG: x / pH: x  Gluc: 74 mg/dL / Ketone: x  / Bili: x / Urobili: x   Blood: x / Protein: x / Nitrite: x   Leuk Esterase: x / RBC: x / WBC x   Sq Epi: x / Non Sq Epi: x / Bacteria: x        RADIOLOGY & ADDITIONAL TESTS:    Imaging Personally Reviewed:    Consultant(s) Notes Reviewed:      Care Discussed with Consultants/Other Providers:

## 2024-12-18 NOTE — CONSULT NOTE ADULT - ASSESSMENT
71-year-old male with PMHx of ESRD due to bilateral nephrectomy for RCC in 2015, s/p Hep C+ DDRT in 7/2018 with Simulect induction complicated by DGF, mild rejection treated with steroids, distal ureteral stricture requiring multiple procedures PCN, NU, ureteral dilations, currently gets stent exchange by Dr. Phillip (with baseline creatinine ~ 2.7-2.9),HTN, DM type II, Prostate cancer s/p Orgovyx and radiation therapy completed 8/2021 PSA down to 0.03 in Jan 2022., BPH s/p TURP in 11/2021,  SBO s/p ileostomy with reversal in 2017. Ho CMV viremia, recurrent UTIs, constipation, anal fissure presenting with LUE swelling and pain. Transplant nephrology consulted for CHELA on transplant kidney and IS.     1. s/p Hep C+ DDRT in 7/2018   - complicated by DGF, mild rejection treated with steroids, distal ureteral stricture requiring multiple procedures PCN, ureteral dilations, currently gets stent exchanges with Dr. Phillip  - SCr prior to admission was 2.82 (11/4/24). SCr on admission elevated to 3.4 (12/17) but improving to 3.14 today (12/18/24)  - Transplant US 12/18/24: No evidence of a significant renal artery stenosis. Moderate hydroureteronephrosis of the RLQ renal transplant.  - Consider urology eval given complex urological history (stents, s/p TURP, prostate ca hx)  - Monitor labs and I/Os. Avoid nephrotoxins including, ACE/ARB, NSAIDs, contrast, etc. Dose medications as per eGFR.     2. Immunosuppression   - Continue MMF 250mg BID, prednisone 5mg, envarsus 4mg  - Goal Envarsus level 5-7. Check tacro trough 30 mins prior to AM dose.     3. LUE Swelling   - Vascular consulted   - LUE duplex negative for DVT.   - Obtain fistula duplex as per vascular surgery    4. Oxalaturia - continue calcium carbonate 600mg BID, and sodium citrate 50 ml BID.     If you have any questions, please feel free to contact me:  Kathrine Luo MD PGY-4  Nephrology Fellow  [employer] Teams (Preferred)/ Pager [phone]   (After 5pm or on weekends please page the on-call fellow)        71-year-old male with PMHx of ESRD due to bilateral nephrectomy for RCC in 2015, s/p Hep C+ DDRT in 7/2018 with Simulect induction complicated by DGF, mild rejection treated with steroids, distal ureteral stricture requiring multiple procedures PCN, NU, ureteral dilations, currently gets stent exchange by Dr. Phillip (with baseline creatinine ~ 2.7-2.9),HTN, DM type II, Prostate cancer s/p Orgovyx and radiation therapy completed 8/2021 PSA down to 0.03 in Jan 2022., BPH s/p TURP in 11/2021,  SBO s/p ileostomy with reversal in 2017. Ho CMV viremia, recurrent UTIs, constipation, anal fissure presenting with LUE swelling and pain. Transplant nephrology consulted for CHELA on transplant kidney and IS.     1. s/p Hep C+ DDRT in 7/2018   - complicated by DGF, mild rejection treated with steroids, distal ureteral stricture requiring multiple procedures PCN, ureteral dilations, currently gets stent exchanges with Dr. Phillip  - SCr prior to admission was 2.82 (11/4/24). SCr on admission elevated to 3.4 (12/17) but improving to 3.14 today (12/18/24)  - Transplant US 12/18/24: No evidence of a significant renal artery stenosis. Moderate hydroureteronephrosis of the RLQ renal transplant.  - Consider urology eval given complex urological history (stents, s/p TURP, prostate ca hx)  - Monitor labs and I/Os. Avoid nephrotoxins including, ACE/ARB, NSAIDs, contrast, etc. Dose medications as per eGFR.     2. Immunosuppression   - Continue MMF 250mg BID, prednisone 5mg, envarsus 4mg  - Goal Envarsus level 5-7. Check tacro trough 30 mins prior to AM dose.     3. LUE Swelling   - Vascular consulted   - LUE duplex negative for DVT.   - Obtain fistula duplex as per vascular surgery    4. Oxaluria - continue calcium carbonate 600mg BID, and sodium citrate 50 ml BID.     If you have any questions, please feel free to contact me:  Kathrine Luo MD PGY-4  Nephrology Fellow  [employer] Teams (Preferred)/ Pager [phone]   (After 5pm or on weekends please page the on-call fellow)

## 2024-12-18 NOTE — H&P ADULT - NSHPPHYSICALEXAM_GEN_ALL_CORE
Vital Signs Last 24 Hrs  T(C): 36.6 (18 Dec 2024 00:55), Max: 37.1 (17 Dec 2024 14:31)  T(F): 97.9 (18 Dec 2024 00:55), Max: 98.8 (17 Dec 2024 14:31)  HR: 80 (18 Dec 2024 00:55) (80 - 114)  BP: 166/90 (18 Dec 2024 00:55) (144/77 - 166/90)  BP(mean): 115 (18 Dec 2024 00:55) (115 - 115)  RR: 18 (18 Dec 2024 00:55) (18 - 20)  SpO2: 99% (18 Dec 2024 00:55) (98% - 100%)    Parameters below as of 17 Dec 2024 19:20  Patient On (Oxygen Delivery Method): room air        CONSTITUTIONAL: Well-groomed, in no apparent distress  EYES: No conjunctival or scleral injection, non-icteric;   NECK: Trachea midline without palpable neck mass  RESPIRATORY: Breathing comfortably; lungs CTA without wheeze/rhonchi/rales  CARDIOVASCULAR: +S1S2, RRR, no M/G/R; no lower extremity edema  GASTROINTESTINAL: No palpable masses or tenderness, +BS throughout, no rebound/guarding  SKIN: No rashes or ulcers noted  NEUROLOGIC: Sensation intact in LEs b/l to light touch  PSYCHIATRIC: A+O x 3; mood and affect appropriate; appropriate insight and judgment Vital Signs Last 24 Hrs  T(C): 36.6 (18 Dec 2024 00:55), Max: 37.1 (17 Dec 2024 14:31)  T(F): 97.9 (18 Dec 2024 00:55), Max: 98.8 (17 Dec 2024 14:31)  HR: 80 (18 Dec 2024 00:55) (80 - 114)  BP: 166/90 (18 Dec 2024 00:55) (144/77 - 166/90)  BP(mean): 115 (18 Dec 2024 00:55) (115 - 115)  RR: 18 (18 Dec 2024 00:55) (18 - 20)  SpO2: 99% (18 Dec 2024 00:55) (98% - 100%)    Parameters below as of 17 Dec 2024 19:20  Patient On (Oxygen Delivery Method): room air        CONSTITUTIONAL: Well-groomed, in no apparent distress  EYES: No conjunctival or scleral injection, non-icteric;   NECK: Trachea midline without palpable neck mass  RESPIRATORY: Breathing comfortably; lungs CTA without wheeze/rhonchi/rales  CARDIOVASCULAR: +S1S2, RRR, no M/G/R; no lower extremity edema  GASTROINTESTINAL: No palpable masses or tenderness, +BS throughout, no rebound/guarding  SKIN: No rashes or ulcers noted  MSK: LUE w/ generalized non-pitting edema, LUE AVF noted with strong thrill   NEUROLOGIC: Sensation intact in LEs b/l to light touch  PSYCHIATRIC: AAO x 3

## 2024-12-18 NOTE — H&P ADULT - HISTORY OF PRESENT ILLNESS
This is a 73 y/o male PMHx bilateral RCC (Clear cell) s/p R +L nephrectomy, prostate CA s/p XRT, ESRD previously on HD s/p renal transplant (July 2018) now w/ CKD4, T2DM, HTN, HLD, HCV s/p treatment, bilateral TKR, and hyperhomocysteinemia who presents for LUE swelling. He has been having LUE swelling for the past 4 days. Went to see his oncologist yesterday who saw the LUE swelling and recommended he come to the ED here for further evaluation given his history of LUE AVF clot and hyperhomocysteinemia.  This is a 73 y/o male PMHx bilateral RCC (Clear cell) s/p R +L nephrectomy, prostate CA s/p XRT, ESRD previously on HD s/p renal transplant (July 2018) now w/ CKD3b, T2DM, HTN, HLD, HCV s/p treatment, bilateral TKR, and hyperhomocysteinemia who presents for LUE swelling. He has been having LUE swelling for the past 4 days. Went to see his oncologist yesterday who saw the LUE swelling and recommended he come to the ED here for further evaluation given his history of LUE AVF clot and hyperhomocysteinemia. Patient otherwise feels well.     In the ED, he was afebrile and hemodynamically stable, saturating well on RA. CBC w/ baseline normocytic anemia w/ Hb 11.1, CMP w/ evidence of  CHELA on CKD4 w/ Cr 3.43 (baseline 2.2-2.9). UA w/ large LE, 281 WBC, bacteria TNTC. US Doppler of the LUE negative for thrombosis.  This is a 73 y/o male PMHx bilateral RCC (Clear cell) s/p R +L nephrectomy, prostate CA s/p XRT, ESRD previously on HD s/p renal transplant (July 2018) now w/ CKD3b, Hydronephrosis of transplant kidney s/p ureteral stent, T2DM, HTN, HLD, HCV s/p treatment, bilateral TKR, and hyperhomocysteinemia who presents for LUE swelling. He has been having LUE swelling for the past 4 days, that progressively worsened. Denies redness, tenderness or warmth. Went to see his oncologist yesterday who saw the LUE swelling and recommended he come to the ED here for further evaluation given his history of LUE AVF clot and hyperhomocysteinemia. Patient otherwise feels well, denies any other symptoms, feels well.     In the ED, he was afebrile and hemodynamically stable, saturating well on RA. CBC w/ baseline normocytic anemia w/ Hb 11.1, CMP w/ evidence of  CHELA on CKD4 w/ Cr 3.43 (baseline 2.2-2.9). UA w/ large LE, 281 WBC, bacteria TNTC. US Doppler of the LUE negative for thrombosis.  This is a 73 y/o male PMHx bilateral RCC (Clear cell) s/p R +L nephrectomy, prostate CA s/p XRT, ESRD previously on HD s/p renal transplant (DDRT +HCV) now w/ CKD3b, Hydronephrosis of transplant kidney 2/2 ureteral stricture s/p ureteral stent, T2DM, HTN, HLD, HCV s/p treatment, bilateral TKR, and hyperhomocysteinemia who presents for LUE swelling. He has been having LUE swelling for the past 4 days, that progressively worsened. Denies redness, tenderness or warmth. Went to see his oncologist yesterday who saw the LUE swelling and recommended he come to the ED here for further evaluation given his history of LUE AVF clot and hyperhomocysteinemia. Patient otherwise feels well, denies any other symptoms, feels well.     In the ED, he was afebrile and hemodynamically stable, saturating well on RA. CBC w/ baseline normocytic anemia w/ Hb 11.1, CMP w/ evidence of  CHELA on CKD4 w/ Cr 3.43 (baseline 2.2-2.9). UA w/ large LE, 281 WBC, bacteria TNTC. US Doppler of the LUE negative for thrombosis.

## 2024-12-18 NOTE — H&P ADULT - PROBLEM SELECTOR PLAN 1
- Patient with 4 days of LUE swelling  - LUE Duplex negative for DVT  - Vascular consulted, recommended LUE fistulogram, ordered, f/u results  - Patient does have history of LUE AVF clot   - Keep LUE elevated

## 2024-12-18 NOTE — H&P ADULT - PROBLEM SELECTOR PLAN 7
impairments found
- Hb at 11.1  - Gets iron infusions outpatient  - C/w folic acid 1mg daily, B12 1000mcg daily, B6 100mg daily and Niacin 1000mg

## 2024-12-18 NOTE — H&P ADULT - PROBLEM SELECTOR PLAN 2
- Cr 3.43, increased from last known baseline of 2.16 in January  - Could be CHELA vs progression of CKD  - Transplant kidney US pending  - UA w/ large LE, 281 WBC, bacteria TNTC but patient w/o urinary symptoms, would wait until UCx to determine abx need  - Transplant nephro consult in AM - Cr 3.43, increased from last known baseline of 2.16 in January  - Could be CHELA vs progression of CKD  - Transplant kidney US pending  - UA w/ large LE, 281 WBC, bacteria TNTC but patient w/o urinary symptoms, would wait until UCx to determine abx need  - Transplant nephro consult in AM  - Has ureteral stent due to ureteral stricture leading to acquired hydronephrosis of kidney transplant, is due for exchange per patient, last one was in August 2023

## 2024-12-18 NOTE — CONSULT NOTE ADULT - SUBJECTIVE AND OBJECTIVE BOX
Surgery Consult Note  Attending: MD Maggie  Service: Vascular Surgery    HPI: 73 yo M pmhx HTN, T2DM,Hep C, ESRD (radiocephalic fistula at [place]) and kidney and prostate cancer s/p chemo, kidney transplant recipient  in  (now off HD) and Left TKR presents for LUE swelling that began 4 days prior. Patient was seen by his oncologist today (Dr Han [facility] [place]) who recommended he present to the ED. Denies fever, chills, chest pain, shortness of breath, abd pain, nausea, vomiting, hematuria, dysuria, or any other symptoms at this time.     PAST MEDICAL HISTORY:  PAST MEDICAL & SURGICAL HISTORY:  HTN (hypertension)      Type 2 diabetes mellitus  dx:       ESRD (end stage renal disease)  On Dialysis 2013 to 2018      Hepatitis C  In Donor Kidney: patient received treatment for Hep. C : post treatment: patient  tested Negative for Hep C      Benign prostatic hypertrophy      Anal fissure  dx: '    No surgery      Bowel obstruction  '    surgically treated w/ ileostomy; since reversed      Medial meniscus tear    Right      Renal cell carcinoma  s/p b/l nephrectomy and renal transplant in       Prostate cancer  s/p RT      Ureteral stricture of kidney transplant  2018      COVID-19 virus infection  2021, asymptomatic      Bilateral cataracts      Anemia secondary to renal failure      Urine retention      Other complication of kidney transplant      OA (osteoarthritis)      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 s/p Reversal      S/P right knee arthroscopy        Kidney transplant recipient  2018  @ [facility] :  +  Hep C Donor      H/O colonoscopy      S/P TURP (transurethral resection of prostate)  and Transplant Kidney Stent Replacemernt, 21      S/P anal fissurectomy  and chemical denervation  with biopsy and fulguration of anal lesions, 2021      Anal condyloma  S/P excision; 22      S/P total knee replacement, left          ALLERGIES:  Allergies    No Known Allergies    Intolerances        SOCIAL HISTORY: Negative for tobacco, etoh, or drug use    FAMILY HISTORY:  FAMILY HISTORY:  FH: breast cancer (Mother)    FH: type 2 diabetes (Father)    FH: heart attack (Sibling)  age 54 at death    PHYSICAL EXAM:  General: NAD, resting comfortably  HEENT: NC/AT  Pulmonary: normal resp effort  Cardiovascular: NSR  Abdominal: soft, ND/NT  Extremities: WWP, LUE swelling up to elbow, motor and sensory intact, palpable radial pulse and thrill overlying fistula   Neuro: A/O x 3    VITAL SIGNS:  Vital Signs Last 24 Hrs  T(C): 36.6 (18 Dec 2024 00:55), Max: 37.1 (17 Dec 2024 14:31)  T(F): 97.9 (18 Dec 2024 00:55), Max: 98.8 (17 Dec 2024 14:31)  HR: 80 (18 Dec 2024 00:55) (80 - 114)  BP: 166/90 (18 Dec 2024 00:55) (144/77 - 166/90)  BP(mean): 115 (18 Dec 2024 00:55) (115 - 115)  RR: 18 (18 Dec 2024 00:55) (18 - 20)  SpO2: 99% (18 Dec 2024 00:55) (98% - 100%)    Parameters below as of 17 Dec 2024 19:20  Patient On (Oxygen Delivery Method): room air        I&O's Summary      LABS:                        11.1   7.14  )-----------( 168      ( 17 Dec 2024 16:14 )             34.1     12-17    138  |  101  |  28[H]  ----------------------------<  238[H]  5.0   |  24  |  3.43[H]    Ca    9.8      17 Dec 2024 16:14    TPro  6.7  /  Alb  3.8  /  TBili  0.2  /  DBili  x   /  AST  19  /  ALT  8[L]  /  AlkPhos  88  12-17    PT/INR - ( 17 Dec 2024 16:14 )   PT: 10.4 sec;   INR: 0.90 ratio         PTT - ( 17 Dec 2024 16:14 )  PTT:34.6 sec  Urinalysis Basic - ( 18 Dec 2024 01:51 )    Color: Yellow / Appearance: Cloudy / S.009 / pH: x  Gluc: x / Ketone: Negative mg/dL  / Bili: Negative / Urobili: 0.2 mg/dL   Blood: x / Protein: 300 mg/dL / Nitrite: Negative   Leuk Esterase: Large / RBC: 14 /HPF /  /HPF   Sq Epi: x / Non Sq Epi: 0 /HPF / Bacteria: Too Numerous to count /HPF      CAPILLARY BLOOD GLUCOSE        LIVER FUNCTIONS - ( 17 Dec 2024 16:14 )  Alb: 3.8 g/dL / Pro: 6.7 g/dL / ALK PHOS: 88 U/L / ALT: 8 U/L / AST: 19 U/L / GGT: x

## 2024-12-18 NOTE — H&P ADULT - PROBLEM SELECTOR PLAN 3
- S/p R Kidney transplant 2018  - C/w Envarsus 4mg daily  - C/w MMF 500mg BID  - C/w prednisone 5mg daily - S/p R Kidney transplant 2018  - C/w Envarsus 4mg daily - check tacrolimus trough 30 min prior to dose but do not hold dose  - C/w MMF 500mg BID  - C/w prednisone 5mg daily  - C/w bactrim 400-80mg daily for PCP prophylaxis

## 2024-12-18 NOTE — H&P ADULT - NSICDXPASTSURGICALHX_GEN_ALL_CORE_FT
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 s/p Reversal    Kidney transplant recipient 2018  @ [facility] :  +  Hep C Donor    S/P anal fissurectomy and chemical denervation  with biopsy and fulguration of anal lesions, 2021    S/p nephrectomy left 9/15/2015   + Cancer    S/p nephrectomy right 12/10/2015   benign    S/P right knee arthroscopy     S/P total knee replacement, left     S/P TURP (transurethral resection of prostate) and Transplant Kidney Stent Replacemernt, 21

## 2024-12-18 NOTE — CONSULT NOTE ADULT - ASSESSMENT
75 yo M pmhx HTN, T2DM,Hep C, ESRD (radiocephalic fistula at [place]) and kidney and prostate cancer s/p chemo, kidney transplant recipient  in 2018 (now off HD) and Left TKR presents for LUE swelling that began 4 days prior. Vascular surgery consulted for swelling.     Plan:   - No acute vascular surgical intervention  - LUE US showing no DVT    - Can obtain fistula duplex to assess venous outflow from fistula   - Compression/elevation of upper extremity    Pending discussion with fellow on call     Vascular Surgery, [id number]  73 yo M pmhx HTN, T2DM,Hep C, ESRD (radiocephalic fistula at [address]) and kidney and prostate cancer s/p chemo, kidney transplant recipient  in 2018 (now off HD) and Left TKR presents for LUE swelling that began 4 days prior. Vascular surgery consulted for swelling.     Plan:   - No acute vascular surgical intervention  - LUE US showing no DVT    - Can obtain fistula duplex to assess venous outflow from fistula   - Compression/elevation of upper extremity    Discussed with fellow on call    Vascular Surgery, [id number]

## 2024-12-18 NOTE — H&P ADULT - ASSESSMENT
73 y/o male PMHx bilateral RCC (Clear cell) s/p R +L nephrectomy, prostate CA s/p XRT, ESRD previously on HD s/p renal transplant (July 2018) now w/ CKD3b, T2DM, HTN, HLD, HCV s/p treatment, bilateral TKR, and hyperhomocysteinemia who presents for LUE swelling. LUE US negative for DVT, but does have LUE AVF w/ history of clot. Admitted for fistulogram and further monitoring.  75 y/o male PMHx bilateral RCC (Clear cell) s/p R +L nephrectomy, prostate CA s/p XRT, ESRD previously on HD s/p renal transplant (DDRT +HCV) now w/ CKD3b, Hydronephrosis of transplant kidney 2/2 ureteral stricture s/p ureteral stent, 2DM, HTN, HLD, HCV s/p treatment, bilateral TKR, and hyperhomocysteinemia who presents for LUE swelling. LUE US negative for DVT, but does have LUE AVF w/ history of clot. Admitted for fistulogram and further monitoring.

## 2024-12-18 NOTE — H&P ADULT - NSHPLABSRESULTS_GEN_ALL_CORE
11.1   7.14  )-----------( 168      ( 17 Dec 2024 16:14 )             34.1         138  |  101  |  28[H]  ----------------------------<  238[H]  5.0   |  24  |  3.43[H]    Ca    9.8      17 Dec 2024 16:14    TPro  6.7  /  Alb  3.8  /  TBili  0.2  /  DBili  x   /  AST  19  /  ALT  8[L]  /  AlkPhos  88            LIVER FUNCTIONS - ( 17 Dec 2024 16:14 )  Alb: 3.8 g/dL / Pro: 6.7 g/dL / ALK PHOS: 88 U/L / ALT: 8 U/L / AST: 19 U/L / GGT: x           PT/INR - ( 17 Dec 2024 16:14 )   PT: 10.4 sec;   INR: 0.90 ratio         PTT - ( 17 Dec 2024 16:14 )  PTT:34.6 sec  Urinalysis Basic - ( 18 Dec 2024 01:51 )    Color: Yellow / Appearance: Cloudy / S.009 / pH: x  Gluc: x / Ketone: Negative mg/dL  / Bili: Negative / Urobili: 0.2 mg/dL   Blood: x / Protein: 300 mg/dL / Nitrite: Negative   Leuk Esterase: Large / RBC: 14 /HPF /  /HPF   Sq Epi: x / Non Sq Epi: 0 /HPF / Bacteria: Too Numerous to count /HPF

## 2024-12-18 NOTE — H&P ADULT - NSHPREVIEWOFSYSTEMS_GEN_ALL_CORE
Review of Systems:   CONSTITUTIONAL: No fever, weight loss  EYES: No eye pain, visual disturbances, or discharge  RESPIRATORY: No SOB. No cough, wheezing, chills or hemoptysis  CARDIOVASCULAR: No chest pain, palpitations, dizziness, or leg swelling  GASTROINTESTINAL: No abdominal or epigastric pain. No nausea, vomiting, or hematemesis; No diarrhea or constipation. No melena or hematochezia.  GENITOURINARY: No dysuria, frequency, hematuria, or incontinence  NEUROLOGICAL: No headaches, memory loss, loss of strength, numbness, or tremors  SKIN: No itching, burning, rashes, or lesions   MUSCULOSKELETAL: +L arm swelling  PSYCHIATRIC: No depression, anxiety, mood swings, or difficulty sleeping  HEME/LYMPH: No easy bruising, or bleeding gums

## 2024-12-18 NOTE — H&P ADULT - PROBLEM SELECTOR PLAN 8
- C/w allopurinol 100mg daily - C/w allopurinol 100mg daily - provide patient with prescription on d/c, states he ran out

## 2024-12-19 ENCOUNTER — TRANSCRIPTION ENCOUNTER (OUTPATIENT)
Age: 74
End: 2024-12-19

## 2024-12-19 LAB
ALBUMIN SERPL ELPH-MCNC: 3.3 G/DL — SIGNIFICANT CHANGE UP (ref 3.3–5)
ALP SERPL-CCNC: 67 U/L — SIGNIFICANT CHANGE UP (ref 40–120)
ALT FLD-CCNC: 7 U/L — LOW (ref 10–45)
ANION GAP SERPL CALC-SCNC: 10 MMOL/L — SIGNIFICANT CHANGE UP (ref 5–17)
AST SERPL-CCNC: 15 U/L — SIGNIFICANT CHANGE UP (ref 10–40)
BASOPHILS # BLD AUTO: 0 K/UL — SIGNIFICANT CHANGE UP (ref 0–0.2)
BASOPHILS NFR BLD AUTO: 0 % — SIGNIFICANT CHANGE UP (ref 0–2)
BILIRUB SERPL-MCNC: 0.3 MG/DL — SIGNIFICANT CHANGE UP (ref 0.2–1.2)
BUN SERPL-MCNC: 31 MG/DL — HIGH (ref 7–23)
BURR CELLS BLD QL SMEAR: PRESENT — SIGNIFICANT CHANGE UP
CALCIUM SERPL-MCNC: 9.4 MG/DL — SIGNIFICANT CHANGE UP (ref 8.4–10.5)
CHLORIDE SERPL-SCNC: 104 MMOL/L — SIGNIFICANT CHANGE UP (ref 96–108)
CO2 SERPL-SCNC: 24 MMOL/L — SIGNIFICANT CHANGE UP (ref 22–31)
CREAT SERPL-MCNC: 3.69 MG/DL — HIGH (ref 0.5–1.3)
DACRYOCYTES BLD QL SMEAR: SLIGHT — SIGNIFICANT CHANGE UP
EGFR: 16 ML/MIN/1.73M2 — LOW
ELLIPTOCYTES BLD QL SMEAR: SLIGHT — SIGNIFICANT CHANGE UP
EOSINOPHIL # BLD AUTO: 0.15 K/UL — SIGNIFICANT CHANGE UP (ref 0–0.5)
EOSINOPHIL NFR BLD AUTO: 2.6 % — SIGNIFICANT CHANGE UP (ref 0–6)
GLUCOSE BLDC GLUCOMTR-MCNC: 114 MG/DL — HIGH (ref 70–99)
GLUCOSE BLDC GLUCOMTR-MCNC: 115 MG/DL — HIGH (ref 70–99)
GLUCOSE BLDC GLUCOMTR-MCNC: 154 MG/DL — HIGH (ref 70–99)
GLUCOSE BLDC GLUCOMTR-MCNC: 177 MG/DL — HIGH (ref 70–99)
GLUCOSE SERPL-MCNC: 71 MG/DL — SIGNIFICANT CHANGE UP (ref 70–99)
HCT VFR BLD CALC: 33.5 % — LOW (ref 39–50)
HGB BLD-MCNC: 10.5 G/DL — LOW (ref 13–17)
LYMPHOCYTES # BLD AUTO: 2.15 K/UL — SIGNIFICANT CHANGE UP (ref 1–3.3)
LYMPHOCYTES # BLD AUTO: 36.5 % — SIGNIFICANT CHANGE UP (ref 13–44)
MANUAL SMEAR VERIFICATION: SIGNIFICANT CHANGE UP
MCHC RBC-ENTMCNC: 28.7 PG — SIGNIFICANT CHANGE UP (ref 27–34)
MCHC RBC-ENTMCNC: 31.3 G/DL — LOW (ref 32–36)
MCV RBC AUTO: 91.5 FL — SIGNIFICANT CHANGE UP (ref 80–100)
MONOCYTES # BLD AUTO: 0.67 K/UL — SIGNIFICANT CHANGE UP (ref 0–0.9)
MONOCYTES NFR BLD AUTO: 11.3 % — SIGNIFICANT CHANGE UP (ref 2–14)
NEUTROPHILS # BLD AUTO: 2.93 K/UL — SIGNIFICANT CHANGE UP (ref 1.8–7.4)
NEUTROPHILS NFR BLD AUTO: 49.6 % — SIGNIFICANT CHANGE UP (ref 43–77)
PLAT MORPH BLD: NORMAL — SIGNIFICANT CHANGE UP
PLATELET # BLD AUTO: 164 K/UL — SIGNIFICANT CHANGE UP (ref 150–400)
POIKILOCYTOSIS BLD QL AUTO: SLIGHT — SIGNIFICANT CHANGE UP
POTASSIUM SERPL-MCNC: 4.6 MMOL/L — SIGNIFICANT CHANGE UP (ref 3.5–5.3)
POTASSIUM SERPL-SCNC: 4.6 MMOL/L — SIGNIFICANT CHANGE UP (ref 3.5–5.3)
PROT SERPL-MCNC: 5.9 G/DL — LOW (ref 6–8.3)
RBC # BLD: 3.66 M/UL — LOW (ref 4.2–5.8)
RBC # FLD: 14.4 % — SIGNIFICANT CHANGE UP (ref 10.3–14.5)
RBC BLD AUTO: ABNORMAL
SODIUM SERPL-SCNC: 138 MMOL/L — SIGNIFICANT CHANGE UP (ref 135–145)
TACROLIMUS SERPL-MCNC: 8.7 NG/ML — SIGNIFICANT CHANGE UP
WBC # BLD: 5.9 K/UL — SIGNIFICANT CHANGE UP (ref 3.8–10.5)
WBC # FLD AUTO: 5.9 K/UL — SIGNIFICANT CHANGE UP (ref 3.8–10.5)

## 2024-12-19 PROCEDURE — 99222 1ST HOSP IP/OBS MODERATE 55: CPT

## 2024-12-19 PROCEDURE — 99233 SBSQ HOSP IP/OBS HIGH 50: CPT

## 2024-12-19 RX ADMIN — MYCOPHENOLATE MOFETIL 500 MILLIGRAM(S): 250 CAPSULE ORAL at 17:27

## 2024-12-19 RX ADMIN — Medication 1: at 17:27

## 2024-12-19 RX ADMIN — NIFEDIPINE 60 MILLIGRAM(S): 60 TABLET, EXTENDED RELEASE ORAL at 05:05

## 2024-12-19 RX ADMIN — Medication 1000 MILLIGRAM(S): at 21:58

## 2024-12-19 RX ADMIN — ATORVASTATIN CALCIUM 10 MILLIGRAM(S): 40 TABLET, FILM COATED ORAL at 21:57

## 2024-12-19 RX ADMIN — HEPARIN SODIUM 5000 UNIT(S): 1000 INJECTION, SOLUTION INTRAVENOUS; SUBCUTANEOUS at 21:57

## 2024-12-19 RX ADMIN — Medication 100 MILLIGRAM(S): at 11:42

## 2024-12-19 RX ADMIN — Medication 1 MILLIGRAM(S): at 11:42

## 2024-12-19 RX ADMIN — Medication 1: at 12:42

## 2024-12-19 RX ADMIN — MYCOPHENOLATE MOFETIL 500 MILLIGRAM(S): 250 CAPSULE ORAL at 05:04

## 2024-12-19 RX ADMIN — Medication 1 TABLET(S): at 11:42

## 2024-12-19 RX ADMIN — Medication 15 MILLILITER(S): at 11:43

## 2024-12-19 RX ADMIN — TACROLIMUS 4 MILLIGRAM(S): 0.5 CAPSULE ORAL at 05:04

## 2024-12-19 RX ADMIN — ALLOPURINOL 100 MILLIGRAM(S): 100 TABLET ORAL at 11:42

## 2024-12-19 RX ADMIN — HEPARIN SODIUM 5000 UNIT(S): 1000 INJECTION, SOLUTION INTRAVENOUS; SUBCUTANEOUS at 15:16

## 2024-12-19 RX ADMIN — HEPARIN SODIUM 5000 UNIT(S): 1000 INJECTION, SOLUTION INTRAVENOUS; SUBCUTANEOUS at 05:04

## 2024-12-19 RX ADMIN — Medication 100 MILLIGRAM(S): at 05:05

## 2024-12-19 RX ADMIN — CYANOCOBALAMIN 1000 MICROGRAM(S): 1000 INJECTION, SOLUTION INTRAMUSCULAR; SUBCUTANEOUS at 11:42

## 2024-12-19 RX ADMIN — Medication 5 MILLIGRAM(S): at 05:04

## 2024-12-19 NOTE — PROGRESS NOTE ADULT - SUBJECTIVE AND OBJECTIVE BOX
Patient is a 74y old  Male who presents with a chief complaint of LUE swelling (19 Dec 2024 09:15)      SUBJECTIVE / OVERNIGHT EVENTS: pt feels better, no pain in arm, no cp, sob, chills     MEDICATIONS  (STANDING):  allopurinol 100 milliGRAM(s) Oral daily  atorvastatin 10 milliGRAM(s) Oral at bedtime  citric acid/sodium citrate Solution 15 milliLiter(s) Oral daily  cyanocobalamin 1000 MICROGram(s) Oral daily  dextrose 5%. 1000 milliLiter(s) (50 mL/Hr) IV Continuous <Continuous>  dextrose 5%. 1000 milliLiter(s) (100 mL/Hr) IV Continuous <Continuous>  dextrose 50% Injectable 25 Gram(s) IV Push once  dextrose 50% Injectable 12.5 Gram(s) IV Push once  dextrose 50% Injectable 25 Gram(s) IV Push once  folic acid 1 milliGRAM(s) Oral daily  glucagon  Injectable 1 milliGRAM(s) IntraMuscular once  heparin   Injectable 5000 Unit(s) SubCutaneous every 8 hours  insulin lispro (ADMELOG) corrective regimen sliding scale   SubCutaneous three times a day before meals  metoprolol succinate  milliGRAM(s) Oral daily  mycophenolate mofetil 500 milliGRAM(s) Oral two times a day  niacin SR 1000 milliGRAM(s) Oral at bedtime  NIFEdipine XL 60 milliGRAM(s) Oral daily  predniSONE   Tablet 5 milliGRAM(s) Oral daily  pyridoxine 100 milliGRAM(s) Oral daily  tacrolimus ER Tablet (ENVARSUS XR) 4 milliGRAM(s) Oral daily  trimethoprim   80 mG/sulfamethoxazole 400 mG 1 Tablet(s) Oral daily    MEDICATIONS  (PRN):  acetaminophen     Tablet .. 650 milliGRAM(s) Oral every 6 hours PRN Temp greater or equal to 38C (100.4F), Mild Pain (1 - 3)  dextrose Oral Gel 15 Gram(s) Oral once PRN Blood Glucose LESS THAN 70 milliGRAM(s)/deciliter  melatonin 3 milliGRAM(s) Oral at bedtime PRN Insomnia        CAPILLARY BLOOD GLUCOSE      POCT Blood Glucose.: 177 mg/dL (19 Dec 2024 12:08)  POCT Blood Glucose.: 115 mg/dL (19 Dec 2024 08:10)  POCT Blood Glucose.: 122 mg/dL (18 Dec 2024 21:35)  POCT Blood Glucose.: 237 mg/dL (18 Dec 2024 17:04)    I&O's Summary    18 Dec 2024 07:01  -  19 Dec 2024 07:00  --------------------------------------------------------  IN: 900 mL / OUT: 1200 mL / NET: -300 mL    19 Dec 2024 07:01  -  19 Dec 2024 12:56  --------------------------------------------------------  IN: 480 mL / OUT: 450 mL / NET: 30 mL        PHYSICAL EXAM:  GENERAL: NAD, well-developed  HEAD:  Atraumatic, Normocephalic  EYES: EOMI, PERRLA, conjunctiva and sclera clear  NECK: Supple, No JVD  CHEST/LUNG: Clear to auscultation bilaterally; No wheeze  HEART: Regular rate and rhythm; No murmurs, rubs, or gallops  ABDOMEN: Soft, Nontender, Nondistended; Bowel sounds present  EXTREMITIES:  2+ Peripheral Pulses, LUE AVF  PSYCH: AAOx3  NEUROLOGY: non-focal  SKIN: No rashes or lesions    LABS:                        10.5   5.90  )-----------( 164      ( 19 Dec 2024 07:13 )             33.5     12-19    138  |  104  |  31[H]  ----------------------------<  71  4.6   |  24  |  3.69[H]    Ca    9.4      19 Dec 2024 07:12    TPro  5.9[L]  /  Alb  3.3  /  TBili  0.3  /  DBili  x   /  AST  15  /  ALT  7[L]  /  AlkPhos  67  12-19    PT/INR - ( 18 Dec 2024 05:54 )   PT: 11.0 sec;   INR: 0.96 ratio         PTT - ( 18 Dec 2024 05:54 )  PTT:27.4 sec      Urinalysis Basic - ( 19 Dec 2024 07:12 )    Color: x / Appearance: x / SG: x / pH: x  Gluc: 71 mg/dL / Ketone: x  / Bili: x / Urobili: x   Blood: x / Protein: x / Nitrite: x   Leuk Esterase: x / RBC: x / WBC x   Sq Epi: x / Non Sq Epi: x / Bacteria: x        RADIOLOGY & ADDITIONAL TESTS:    Imaging Personally Reviewed:    Consultant(s) Notes Reviewed:      Care Discussed with Consultants/Other Providers:

## 2024-12-19 NOTE — CONSULT NOTE ADULT - ATTENDING COMMENTS
Longstanding obstruction of transplant kidney ureter  due for stent exchange  abx, will arrange stent exchange outpatient once infection cleared

## 2024-12-19 NOTE — CONSULT NOTE ADULT - ASSESSMENT
74m, hx RCC s/p b/l nephrectomy, c/b ESRD s/p b/l renal transplant c/b strictures  w/ CKD3b managed w/ chronic ureteral stents who presented w/ LUE swelling that started 4 days ago. On presentation he was found to have an CHELA, w/ moderate hydro of his transplant kidney, prompting urology consult. His stent was last changed Aug 2023 by Dr. Phillip; was due for a stent exchange this past July but unfortunately lost to follow up. Denies  fevers, chills, pain, N/V, hematuria or dysuria.     AVSS, no CVA tenderness on exam.  Labs 5.9/10.5/3.69(baseline 2.1)  UA w/ large leuks, neg nitrite, 281 wbc.   UCX growing GNR. Pt is on Bactrim.  Pt had RBUS w/ moderate hydroureteronephrosis, and partially visualized stent.    Recommendations:   - Empiric abx  - F/u culture   - CTAP non con to evaluate for encrustation of stents  - Plan for outpatient stent exchange with Marjan after discharge  - Urology will follow  - Discussed with Marjan    [facility]  [address] [place] [phone]

## 2024-12-19 NOTE — CONSULT NOTE ADULT - SUBJECTIVE AND OBJECTIVE BOX
This is a 73 y/o male PMHx bilateral RCC (Clear cell) s/p R +L nephrectomy, prostate CA s/p XRT, ESRD previously on HD s/p renal transplant (DDRT +HCV) now w/ CKD3b, Hydronephrosis of transplant kidney 2/2 ureteral stricture s/p ureteral stent, T2DM, HTN, HLD, HCV s/p treatment, bilateral TKR, and hyperhomocysteinemia who presents for LUE swelling. He has been having LUE swelling for the past 4 days, that progressively worsened.     Patient was found to have CHELA on admission and US showing moderate hydronephrosis of transplant kidney.  Patient had been having stent changes by Dr. Phillip, last changed in 2023.  Patient denies fevers or chills, pain, N/V, hematuria, dysuria.  Denies any voiding complaints.     PAST MEDICAL & SURGICAL HISTORY:  HTN (hypertension)      Type 2 diabetes mellitus  dx:       ESRD (end stage renal disease)  On Dialysis '  to 2018      Hepatitis C  In Donor Kidney: patient received treatment for Hep. C : post treatment: patient  tested Negative for Hep C      Benign prostatic hypertrophy      Anal fissure  dx: '    No surgery      Bowel obstruction  '    surgically treated w/ ileostomy; since reversed      Medial meniscus tear    Right      Renal cell carcinoma  s/p b/l nephrectomy and renal transplant in       Prostate cancer  s/p RT      Ureteral stricture of kidney transplant  2018      COVID-19 virus infection  2021, asymptomatic      Bilateral cataracts      Anemia secondary to renal failure      Urine retention      Other complication of kidney transplant      OA (osteoarthritis)      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 s/p Reversal      S/P right knee arthroscopy        Kidney transplant recipient  2018  @ [facility] :  +  Hep C Donor      H/O colonoscopy      S/P TURP (transurethral resection of prostate)  and Transplant Kidney Stent Replacemernt, 21      S/P anal fissurectomy  and chemical denervation  with biopsy and fulguration of anal lesions, 2021      Anal condyloma  S/P excision; 22      S/P total knee replacement, left        MEDICATIONS  (STANDING):  allopurinol 100 milliGRAM(s) Oral daily  atorvastatin 10 milliGRAM(s) Oral at bedtime  citric acid/sodium citrate Solution 15 milliLiter(s) Oral daily  cyanocobalamin 1000 MICROGram(s) Oral daily  dextrose 5%. 1000 milliLiter(s) (50 mL/Hr) IV Continuous <Continuous>  dextrose 5%. 1000 milliLiter(s) (100 mL/Hr) IV Continuous <Continuous>  dextrose 50% Injectable 25 Gram(s) IV Push once  dextrose 50% Injectable 12.5 Gram(s) IV Push once  dextrose 50% Injectable 25 Gram(s) IV Push once  folic acid 1 milliGRAM(s) Oral daily  glucagon  Injectable 1 milliGRAM(s) IntraMuscular once  heparin   Injectable 5000 Unit(s) SubCutaneous every 8 hours  insulin lispro (ADMELOG) corrective regimen sliding scale   SubCutaneous three times a day before meals  metoprolol succinate  milliGRAM(s) Oral daily  mycophenolate mofetil 500 milliGRAM(s) Oral two times a day  niacin SR 1000 milliGRAM(s) Oral at bedtime  NIFEdipine XL 60 milliGRAM(s) Oral daily  predniSONE   Tablet 5 milliGRAM(s) Oral daily  pyridoxine 100 milliGRAM(s) Oral daily  tacrolimus ER Tablet (ENVARSUS XR) 4 milliGRAM(s) Oral daily  trimethoprim   80 mG/sulfamethoxazole 400 mG 1 Tablet(s) Oral daily    MEDICATIONS  (PRN):  acetaminophen     Tablet .. 650 milliGRAM(s) Oral every 6 hours PRN Temp greater or equal to 38C (100.4F), Mild Pain (1 - 3)  dextrose Oral Gel 15 Gram(s) Oral once PRN Blood Glucose LESS THAN 70 milliGRAM(s)/deciliter  melatonin 3 milliGRAM(s) Oral at bedtime PRN Insomnia    FAMILY HISTORY:  FH: breast cancer (Mother)    FH: type 2 diabetes (Father)    FH: heart attack (Sibling)  age 54 at death      Allergies    No Known Allergies    Intolerances      SOCIAL HISTORY:   Tobacco hx:    REVIEW OF SYSTEMS: Pertinent positives and negatives as stated in HPI, otherwise negative    Vital signs  T(C): 37, Max: 37 ( @ 09:07)  HR: 75  BP: 157/76  SpO2: 100%    Output    UOP    Physical Exam  Gen: NAD  Pulm: No respiratory distress, no subcostal retractions  CV: RRR, no JVD  Abd: Soft, NT, ND  MSK: No edema present    LABS:           @ 07:13    WBC 5.90  / Hct 33.5  / SCr --        @ 07:12    WBC --    / Hct --    / SCr 3.69         138  |  104  |  31[H]  ----------------------------<  71  4.6   |  24  |  3.69[H]    Ca    9.4      19 Dec 2024 07:12    TPro  5.9[L]  /  Alb  3.3  /  TBili  0.3  /  DBili  x   /  AST  15  /  ALT  7[L]  /  AlkPhos  67      PT/INR - ( 18 Dec 2024 05:54 )   PT: 11.0 sec;   INR: 0.96 ratio         PTT - ( 18 Dec 2024 05:54 )  PTT:27.4 sec  Urinalysis Basic - ( 19 Dec 2024 07:12 )    Color: x / Appearance: x / SG: x / pH: x  Gluc: 71 mg/dL / Ketone: x  / Bili: x / Urobili: x   Blood: x / Protein: x / Nitrite: x   Leuk Esterase: x / RBC: x / WBC x   Sq Epi: x / Non Sq Epi: x / Bacteria: x        Urine Cx:   Blood Cx:    RADIOLOGY:  < from: US Trans Kidney w/ Doppler, Right (24 @ 00:58) >    IMPRESSION:    No evidence of a significant renal artery stenosis. Moderate   hydroureteronephrosis of the RLQ renal transplant.    < end of copied text >

## 2024-12-19 NOTE — PHYSICAL THERAPY INITIAL EVALUATION ADULT - PERTINENT HX OF CURRENT PROBLEM, REHAB EVAL
HPI: 75 yo M pmhx HTN, T2DM,Hep C, ESRD (radiocephalic fistula at [facility]) and kidney and prostate cancer s/p chemo, kidney transplant recipient  in 2018 (now off HD) and Left TKR presents for LUE swelling that began 4 days prior. Patient was seen by his oncologist today (Dr Han - [facility]) who recommended he present to the ED.  LUE doppler: (-)  US R kidney: No evidence of a significant renal artery stenosis. Moderate   hydroureteronephrosis of the RLQ renal transplant.  doppler hemodialysis access L: The flow volume measured inthe radial artery proximal to the surgical   anastomosis measured 1506 mL/m.  There is retrograde through the radial artery distal to the surgical   anastomosis.

## 2024-12-19 NOTE — DISCHARGE NOTE PROVIDER - HOSPITAL COURSE
HPI:  This is a 75 y/o male PMHx bilateral RCC (Clear cell) s/p R +L nephrectomy, prostate CA s/p XRT, ESRD previously on HD s/p renal transplant (DDRT +HCV) now w/ CKD3b, Hydronephrosis of transplant kidney 2/2 ureteral stricture s/p ureteral stent, T2DM, HTN, HLD, HCV s/p treatment, bilateral TKR, and hyperhomocysteinemia who presents for LUE swelling. He has been having LUE swelling for the past 4 days, that progressively worsened. Denies redness, tenderness or warmth. Went to see his oncologist yesterday who saw the LUE swelling and recommended he come to the ED here for further evaluation given his history of LUE AVF clot and hyperhomocysteinemia. Patient otherwise feels well, denies any other symptoms, feels well.     In the ED, he was afebrile and hemodynamically stable, saturating well on RA. CBC w/ baseline normocytic anemia w/ Hb 11.1, CMP w/ evidence of  CHELA on CKD4 w/ Cr 3.43 (baseline 2.2-2.9). UA w/ large LE, 281 WBC, bacteria TNTC. US Doppler of the LUE negative for thrombosis.  (18 Dec 2024 02:29)    Hospital Course:      Important Medication Changes and Reason:    Active or Pending Issues Requiring Follow-up:    Advanced Directives:   [ ] Full code  [ ] DNR  [ ] Hospice    Discharge Diagnoses:         HPI:  This is a 75 y/o male PMHx bilateral RCC (Clear cell) s/p R +L nephrectomy, prostate CA s/p XRT, ESRD previously on HD s/p renal transplant (DDRT +HCV) now w/ CKD3b, Hydronephrosis of transplant kidney 2/2 ureteral stricture s/p ureteral stent, T2DM, HTN, HLD, HCV s/p treatment, bilateral TKR, and hyperhomocysteinemia who presents for LUE swelling. He has been having LUE swelling for the past 4 days, that progressively worsened. Denies redness, tenderness or warmth. Went to see his oncologist yesterday who saw the LUE swelling and recommended he come to the ED here for further evaluation given his history of LUE AVF clot and hyperhomocysteinemia. Patient otherwise feels well, denies any other symptoms, feels well.     In the ED, he was afebrile and hemodynamically stable, saturating well on RA. CBC w/ baseline normocytic anemia w/ Hb 11.1, CMP w/ evidence of  CHELA on CKD4 w/ Cr 3.43 (baseline 2.2-2.9). UA w/ large LE, 281 WBC, bacteria TNTC. US Doppler of the LUE negative for thrombosis.  (18 Dec 2024 02:29)    Hospital Course: Admitted for 4 days of LUE swelling. UE Duplex negative for DVT, outpatient fistulogram planning with vascular. Patient also with Acute kidney injury superimposed on CKD. Cr in the 4s, increased from last known baseline of 2.16 in January, UA w/ large LE, 281 WBC, bacteria TNTC but patient w/o urinary symptoms, Urine cx with klebsiella. Started on cipro per ID for ppx. Patient has ureteral stent due to ureteral stricture leading to acquired hydronephrosis of kidney transplant, is due for exchange per patient, last one was in August 2023. Renal sono with moderate hydronephrosis, armenta placed by urology, repeat US noted with improvement. Patient s/p R Kidney transplant 2018, to continue envarsus, MMF, prednisone, bactrim 400-80mg daily for PCP prophylaxis.  Patient is medically clear for discharge by Dr. Dumont to home Outpatient follow up with PCP,   Med recc and clearance discussed with attending    Important Medication Changes and Reason:  none     Active or Pending Issues Requiring Follow-up:   Patient should follow up outpatient with Dr. Serrano for an outpatient fistulogram planning   Follow up with urology     Advanced Directives:   [ x] Full code  [ ] DNR  [ ] Hospice    Discharge Diagnoses:  CHELA   UTI

## 2024-12-19 NOTE — PHYSICAL THERAPY INITIAL EVALUATION ADULT - ADDITIONAL COMMENTS
Pt lives in a house with his wife, 3 steps to enter and 1 flight to the bedroom. Pt was independent prior to admission and did not use an assistive device. Pt is driving and retired.

## 2024-12-19 NOTE — DISCHARGE NOTE PROVIDER - CARE PROVIDER_API CALL
Bannazadeh, Mohsen  Vascular Surgery  [address], [address]  [place] [address]  Phone: [phone]  Fax: [phone]  Follow Up Time: 1 week    SOUMYA JOSEPH  [address]  [place] [address]  Phone: [phone]  Fax: [phone]  Established Patient  Follow Up Time: Routine

## 2024-12-19 NOTE — DISCHARGE NOTE PROVIDER - CARE PROVIDERS DIRECT ADDRESSES
,[email or web address]

## 2024-12-19 NOTE — PROGRESS NOTE ADULT - SUBJECTIVE AND OBJECTIVE BOX
Vascular Surgery Progress Note     Subjective:  Patient seen and examined.       Vital Signs:  Vital Signs Last 24 Hrs  T(C): 37 (19 Dec 2024 09:07), Max: 37 (19 Dec 2024 09:07)  T(F): 98.6 (19 Dec 2024 09:07), Max: 98.6 (19 Dec 2024 09:07)  HR: 75 (19 Dec 2024 09:07) (73 - 99)  BP: 157/76 (19 Dec 2024 09:07) (144/72 - 166/82)  RR: 18 (19 Dec 2024 09:07) (18 - 18)  SpO2: 100% (19 Dec 2024 09:07) (95% - 100%)    Parameters below as of 19 Dec 2024 09:07  Patient On (Oxygen Delivery Method): room air        CAPILLARY BLOOD GLUCOSE      POCT Blood Glucose.: 115 mg/dL (19 Dec 2024 08:10)  POCT Blood Glucose.: 122 mg/dL (18 Dec 2024 21:35)  POCT Blood Glucose.: 237 mg/dL (18 Dec 2024 17:04)  POCT Blood Glucose.: 149 mg/dL (18 Dec 2024 12:15)      I&O's Detail    18 Dec 2024 07:01  -  19 Dec 2024 07:00  --------------------------------------------------------  IN:    Oral Fluid: 900 mL  Total IN: 900 mL    OUT:    Voided (mL): 1200 mL  Total OUT: 1200 mL    Total NET: -300 mL      19 Dec 2024 07:01  -  19 Dec 2024 09:15  --------------------------------------------------------  IN:    Oral Fluid: 480 mL  Total IN: 480 mL    OUT:    Voided (mL): 450 mL  Total OUT: 450 mL    Total NET: 30 mL            Physical Exam:  General: NAD, resting comfortably in bed  Respiratory: Nonlabored respirations  Cardio: pulse present  Vascular: appears warm and well perfused, LUE edematous, radial pulse, thrill over fistula        Labs:    12-19    138  |  104  |  31[H]  ----------------------------<  71  4.6   |  24  |  3.69[H]    Ca    9.4      19 Dec 2024 07:12    TPro  5.9[L]  /  Alb  3.3  /  TBili  0.3  /  DBili  x   /  AST  15  /  ALT  7[L]  /  AlkPhos  67  12-19    LIVER FUNCTIONS - ( 19 Dec 2024 07:12 )  Alb: 3.3 g/dL / Pro: 5.9 g/dL / ALK PHOS: 67 U/L / ALT: 7 U/L / AST: 15 U/L / GGT: x                                 10.5   5.90  )-----------( 164      ( 19 Dec 2024 07:13 )             33.5     PT/INR - ( 18 Dec 2024 05:54 )   PT: 11.0 sec;   INR: 0.96 ratio         PTT - ( 18 Dec 2024 05:54 )  PTT:27.4 sec

## 2024-12-19 NOTE — DISCHARGE NOTE PROVIDER - NSDCMRMEDTOKEN_GEN_ALL_CORE_FT
allopurinol 100 mg oral tablet: 1 tab(s) orally once a day in AM  atorvastatin 10 mg oral tablet: 1 tab(s) orally once a day (at bedtime)  cyanocobalamin 1000 mcg oral tablet: 1 tab(s) orally once a day  Envarsus XR 4 mg oral tablet, extended release: 1 tab(s) orally once a day  folic acid 1 mg oral tablet: 1 tab(s) orally once a day  glimepiride 2 mg oral tablet: 1 tab(s) orally 2 times a day  Metoprolol Succinate  mg oral tablet, extended release: 1 tab(s) orally once a day  mycophenolate mofetil 500 mg oral tablet: 1 tab(s) orally 2 times a day  niacin 1000 mg oral tablet, extended release: 1 tab(s) orally once a day  NIFEdipine 60 mg oral tablet, extended release: 1 tab(s) orally once a day  predniSONE 5 mg oral tablet: 1 tab(s) orally once a day in AM  Rybelsus 14 mg oral tablet: 1 tab(s) orally once a day  sodium citrate-citric acid 500 mg-334 mg/5 mL oral solution: 15 milliliter(s) orally once a day in AM  sulfamethoxazole-trimethoprim 400 mg-80 mg oral tablet: 1 tab(s) orally once a day  Vitamin B6 100 mg oral tablet: 1 tab(s) orally once a day   allopurinol 100 mg oral tablet: 1 tab(s) orally once a day in AM  atorvastatin 10 mg oral tablet: 1 tab(s) orally once a day (at bedtime)  ciprofloxacin 500 mg oral tablet: 1 tab(s) orally every 24 hours extra dose of Ciprofloxacin to be taken day prior to ureteral stent exchange as outpatient  cyanocobalamin 1000 mcg oral tablet: 1 tab(s) orally once a day  Envarsus XR 4 mg oral tablet, extended release: 1 tab(s) orally once a day  folic acid 1 mg oral tablet: 1 tab(s) orally once a day  glimepiride 2 mg oral tablet: 1 tab(s) orally 2 times a day  Metoprolol Succinate  mg oral tablet, extended release: 1 tab(s) orally once a day  mycophenolate mofetil 500 mg oral tablet: 1 tab(s) orally 2 times a day  niacin 1000 mg oral tablet, extended release: 1 tab(s) orally once a day  NIFEdipine 60 mg oral tablet, extended release: 1 tab(s) orally once a day  predniSONE 5 mg oral tablet: 1 tab(s) orally once a day in AM  Rybelsus 14 mg oral tablet: 1 tab(s) orally once a day  sodium citrate-citric acid 500 mg-334 mg/5 mL oral solution: 15 milliliter(s) orally once a day in AM  sulfamethoxazole-trimethoprim 400 mg-80 mg oral tablet: 1 tab(s) orally once a day  Vitamin B6 100 mg oral tablet: 1 tab(s) orally once a day

## 2024-12-19 NOTE — DISCHARGE NOTE PROVIDER - NSDCCPCAREPLAN_GEN_ALL_CORE_FT
PRINCIPAL DISCHARGE DIAGNOSIS  Diagnosis: CHELA (acute kidney injury)  Assessment and Plan of Treatment: Avoid taking NSAIDs (ex: Ibuprofen, Advil, Celebrex, Naprosyn) and other agents that can harm the kidneys such as intravenous contrast for diagnostic testing, combination cold medications, etc. until you are instructed to do so by your Primary Care Physician.  Have all of your medications adjusted for your renal function by your Health Care Provider.  Blood pressure control is important.  Take all medication as prescribed.  Do not overconsume foods that are high in potassium, such as bananas, until you are instructed to do so by your primary care physician.      SECONDARY DISCHARGE DIAGNOSES  Diagnosis: Left arm swelling  Assessment and Plan of Treatment:     Diagnosis: T2DM (type 2 diabetes mellitus)  Assessment and Plan of Treatment: HgA1C this admission.  Make sure you get your HgA1c checked every three months.  If you take oral diabetes medications, check your blood glucose two times a day.  If you take insulin, check your blood glucose before meals and at bedtime.  It's important not to skip any meals.  Keep a log of your blood glucose results and always take it with you to your doctor appointments.  Keep a list of your current medications including injectables and over the counter medications and bring this medication list with you to all your doctor appointments.  If you have not seen your ophthalmologist this year call for appointment.  Check your feet daily for redness, sores, or openings. Do not self treat. If no improvement in two days call your primary care physician for an appointment.  Low blood sugar (hypoglycemia) is a blood sugar below 70mg/dl. Check your blood sugar if you feel signs/symptoms of hypoglycemia. If your blood sugar is below 70 take 15 grams of carbohydrates (ex 4 oz of apple juice, 3-4 glucose tablets, or 4-6 oz of regular soda) wait 15 minutes and repeat blood sugar to make sure it comes up above 70.  If your blood sugar is above 70 and you are due for a meal, have a meal.  If you are not due for a meal have a snack.  This snack helps keeps your blood sugar at a safe range.      Diagnosis: HTN (hypertension)  Assessment and Plan of Treatment: Continue to follow a low salt/sodium diet.  Perform physical activities as tolerated in consultation with your Primary Care Provider and physical therapist.  Take all medications as prescribed.  Follow up with your medical doctor for routine blood pressure monitoring at your next visit.  Notify your doctor if you have any of the following symptoms:  Dizziness, lightheadedness, blurry vision, headache, chest pain, or shortness of breath.      Diagnosis: HLD (hyperlipidemia)  Assessment and Plan of Treatment: Low salt, low fat, low cholesterol, diabetic diet if appropriate  Continue medication as prescribed  Exercise, increase your activity level  Pt. verbalized an understanding of all instructions.    Diagnosis: Anemia of chronic disease  Assessment and Plan of Treatment: Notify your doctor immediately if you experience abnormal bleeding.  Avoid overuse of NSAIDs (aspirin, Ibuprofen, Advil, Motrin, and Aleve) unless instructed to do so by your doctor.  Signs of worsening anemia include dizziness, lightheadedness, difficulty concentrating, chest pain, palpitations, and shortness of breath.  If you experience these symptoms call your doctor or call an ambulance to take you to the emergency room.    Diagnosis: Gout  Assessment and Plan of Treatment:     Diagnosis: Renal transplant recipient  Assessment and Plan of Treatment: Please continue to take your medications as prescribed and follow up within a week of discharge with your outpatient provider.     PRINCIPAL DISCHARGE DIAGNOSIS  Diagnosis: CHLEA (acute kidney injury)  Assessment and Plan of Treatment: Velez was placed by urology in the hospital. Follow up with urology as outpatient for ureteral stent exchange. Avoid taking NSAIDs (ex: Ibuprofen, Advil, Celebrex, Naprosyn) and other agents that can harm the kidneys such as intravenous contrast for diagnostic testing, combination cold medications, etc. until you are instructed to do so by your Primary Care Physician.  Have all of your medications adjusted for your renal function by your Health Care Provider.  Blood pressure control is important.  Take all medication as prescribed.  Do not overconsume foods that are high in potassium, such as bananas, until you are instructed to do so by your primary care physician.      SECONDARY DISCHARGE DIAGNOSES  Diagnosis: Acute UTI  Assessment and Plan of Treatment: You have a UTI. Complete your course of Ciprofloxacin 500 mg PO Q24H through 12/28 to complete 7 day course. Please take Ciprofloxacin prior to ureteral stent exchange as outpatient.   - Plan for outpatient stent exchange with Phillip after discharge      Diagnosis: Left arm swelling  Assessment and Plan of Treatment: You were admitted for left arm swelling. Work up was unrevealing, continue to elevated your arms    Diagnosis: Renal transplant recipient  Assessment and Plan of Treatment: Patient should follow up outpatient with Dr. Serrano for an outpatient fistulogram planning   Please continue to take your medications as prescribed and follow up within a week of discharge with your outpatient provider.    Diagnosis: T2DM (type 2 diabetes mellitus)  Assessment and Plan of Treatment: HgA1C this admission.  Make sure you get your HgA1c checked every three months.  If you take oral diabetes medications, check your blood glucose two times a day.  If you take insulin, check your blood glucose before meals and at bedtime.  It's important not to skip any meals.  Keep a log of your blood glucose results and always take it with you to your doctor appointments.  Keep a list of your current medications including injectables and over the counter medications and bring this medication list with you to all your doctor appointments.  If you have not seen your ophthalmologist this year call for appointment.  Check your feet daily for redness, sores, or openings. Do not self treat. If no improvement in two days call your primary care physician for an appointment.  Low blood sugar (hypoglycemia) is a blood sugar below 70mg/dl. Check your blood sugar if you feel signs/symptoms of hypoglycemia. If your blood sugar is below 70 take 15 grams of carbohydrates (ex 4 oz of apple juice, 3-4 glucose tablets, or 4-6 oz of regular soda) wait 15 minutes and repeat blood sugar to make sure it comes up above 70.  If your blood sugar is above 70 and you are due for a meal, have a meal.  If you are not due for a meal have a snack.  This snack helps keeps your blood sugar at a safe range.      Diagnosis: HTN (hypertension)  Assessment and Plan of Treatment: Continue to follow a low salt/sodium diet.  Perform physical activities as tolerated in consultation with your Primary Care Provider and physical therapist.  Take all medications as prescribed.  Follow up with your medical doctor for routine blood pressure monitoring at your next visit.  Notify your doctor if you have any of the following symptoms:  Dizziness, lightheadedness, blurry vision, headache, chest pain, or shortness of breath.      Diagnosis: HLD (hyperlipidemia)  Assessment and Plan of Treatment: Low salt, low fat, low cholesterol, diabetic diet if appropriate  Continue medication as prescribed  Exercise, increase your activity level  Pt. verbalized an understanding of all instructions.    Diagnosis: Anemia of chronic disease  Assessment and Plan of Treatment: Notify your doctor immediately if you experience abnormal bleeding.  Avoid overuse of NSAIDs (aspirin, Ibuprofen, Advil, Motrin, and Aleve) unless instructed to do so by your doctor.  Signs of worsening anemia include dizziness, lightheadedness, difficulty concentrating, chest pain, palpitations, and shortness of breath.  If you experience these symptoms call your doctor or call an ambulance to take you to the emergency room.    Diagnosis: Gout  Assessment and Plan of Treatment: continue with your medications

## 2024-12-19 NOTE — DISCHARGE NOTE PROVIDER - NSDCFUSCHEDAPPT_GEN_ALL_CORE_FT
John Phillip  [facility]  UROLOGY [address] [place] [address]  Scheduled Appointment: 01/14/2025    Arian Bhakta  [facility]  NEPHRO [address] [place] [address]  Scheduled Appointment: 01/23/2025    Zandra Becerra  [facility]  RADMED [address] [place] [address] R  Scheduled Appointment: 02/18/2025

## 2024-12-19 NOTE — DISCHARGE NOTE PROVIDER - PROVIDER TOKENS
PROVIDER:[TOKEN:[[id number]],FOLLOWUP:[1 week]],PROVIDER:[TOKEN:[[id number]],FOLLOWUP:[Routine],ESTABLISHEDPATIENT:[T]]

## 2024-12-19 NOTE — DISCHARGE NOTE PROVIDER - REASON FOR ADMISSION
Pt with hx of strokes who presents complaining of generalized weakness and expressive aphasia. Pt has not ambulated today and fell out of the bed once. According to family, normally he is able to communicate but does not know his family. On exam, the pt is awake and alert and can follow commands , speaks one to two words at a time with purpose, and according to family is near baseline. Notified family of labs and imaging which showed nothing acute. Discussed difficulty of diagnosing a stroke with dementia patients and even if it was a small stroke, it is unlikely there would be much treatment for it. Discussed plan to discharge the pt even if family is comfortable doing so and if he is able to ambulate. Pt and family understand and agree with plan, all questions addressed.    2140 - Pt rechecked. Pt is eating and seems comfortable. Pt's son is now in the room. The son is comfortable with taking the pt home and understands all risks. Discussed plan to discharge the pt and have  call the family tomorrow to check-in. Pt understands and agrees with plan, all questions addressed.     I supervised care provided by the midlevel provider.    We have discussed this patient's history, physical exam, and treatment plan.   I have reviewed the note and personally saw and examined the patient and agree with the plan of care.    Documentation assistance provided by marika Bui for Dr. Baer.  Information recorded by the scribe was done at my direction and has been verified and validated by me.     Osorio Bui  10/28/17 2144       Fransisco Baer MD  10/29/17 0012     LUE swelling

## 2024-12-20 LAB
-  AMOXICILLIN/CLAVULANIC ACID: SIGNIFICANT CHANGE UP
-  AMOXICILLIN/CLAVULANIC ACID: SIGNIFICANT CHANGE UP
-  AMPICILLIN/SULBACTAM: SIGNIFICANT CHANGE UP
-  AMPICILLIN/SULBACTAM: SIGNIFICANT CHANGE UP
-  AMPICILLIN: SIGNIFICANT CHANGE UP
-  AMPICILLIN: SIGNIFICANT CHANGE UP
-  AZTREONAM: SIGNIFICANT CHANGE UP
-  AZTREONAM: SIGNIFICANT CHANGE UP
-  CEFAZOLIN: SIGNIFICANT CHANGE UP
-  CEFAZOLIN: SIGNIFICANT CHANGE UP
-  CEFEPIME: SIGNIFICANT CHANGE UP
-  CEFEPIME: SIGNIFICANT CHANGE UP
-  CEFOXITIN: SIGNIFICANT CHANGE UP
-  CEFOXITIN: SIGNIFICANT CHANGE UP
-  CEFTRIAXONE: SIGNIFICANT CHANGE UP
-  CEFTRIAXONE: SIGNIFICANT CHANGE UP
-  CEFUROXIME: SIGNIFICANT CHANGE UP
-  CEFUROXIME: SIGNIFICANT CHANGE UP
-  CIPROFLOXACIN: SIGNIFICANT CHANGE UP
-  CIPROFLOXACIN: SIGNIFICANT CHANGE UP
-  ERTAPENEM: SIGNIFICANT CHANGE UP
-  ERTAPENEM: SIGNIFICANT CHANGE UP
-  GENTAMICIN: SIGNIFICANT CHANGE UP
-  GENTAMICIN: SIGNIFICANT CHANGE UP
-  IMIPENEM: SIGNIFICANT CHANGE UP
-  IMIPENEM: SIGNIFICANT CHANGE UP
-  LEVOFLOXACIN: SIGNIFICANT CHANGE UP
-  LEVOFLOXACIN: SIGNIFICANT CHANGE UP
-  MEROPENEM: SIGNIFICANT CHANGE UP
-  MEROPENEM: SIGNIFICANT CHANGE UP
-  NITROFURANTOIN: SIGNIFICANT CHANGE UP
-  NITROFURANTOIN: SIGNIFICANT CHANGE UP
-  PIPERACILLIN/TAZOBACTAM: SIGNIFICANT CHANGE UP
-  PIPERACILLIN/TAZOBACTAM: SIGNIFICANT CHANGE UP
-  TOBRAMYCIN: SIGNIFICANT CHANGE UP
-  TOBRAMYCIN: SIGNIFICANT CHANGE UP
-  TRIMETHOPRIM/SULFAMETHOXAZOLE: SIGNIFICANT CHANGE UP
-  TRIMETHOPRIM/SULFAMETHOXAZOLE: SIGNIFICANT CHANGE UP
CULTURE RESULTS: ABNORMAL
GLUCOSE BLDC GLUCOMTR-MCNC: 101 MG/DL — HIGH (ref 70–99)
GLUCOSE BLDC GLUCOMTR-MCNC: 151 MG/DL — HIGH (ref 70–99)
GLUCOSE BLDC GLUCOMTR-MCNC: 182 MG/DL — HIGH (ref 70–99)
GLUCOSE BLDC GLUCOMTR-MCNC: 191 MG/DL — HIGH (ref 70–99)
METHOD TYPE: SIGNIFICANT CHANGE UP
METHOD TYPE: SIGNIFICANT CHANGE UP
ORGANISM # SPEC MICROSCOPIC CNT: ABNORMAL
SPECIMEN SOURCE: SIGNIFICANT CHANGE UP

## 2024-12-20 PROCEDURE — 99222 1ST HOSP IP/OBS MODERATE 55: CPT

## 2024-12-20 PROCEDURE — 99233 SBSQ HOSP IP/OBS HIGH 50: CPT

## 2024-12-20 RX ORDER — TACROLIMUS 0.5 MG/1
2 CAPSULE ORAL DAILY
Refills: 0 | Status: DISCONTINUED | OUTPATIENT
Start: 2024-12-21 | End: 2024-12-24

## 2024-12-20 RX ORDER — CEFTRIAXONE SODIUM 1 G/1
INJECTION, POWDER, FOR SOLUTION INTRAMUSCULAR; INTRAVENOUS
Refills: 0 | Status: DISCONTINUED | OUTPATIENT
Start: 2024-12-20 | End: 2024-12-20

## 2024-12-20 RX ORDER — CEFTRIAXONE SODIUM 1 G/1
1000 INJECTION, POWDER, FOR SOLUTION INTRAMUSCULAR; INTRAVENOUS ONCE
Refills: 0 | Status: COMPLETED | OUTPATIENT
Start: 2024-12-20 | End: 2024-12-20

## 2024-12-20 RX ADMIN — Medication 15 MILLILITER(S): at 13:52

## 2024-12-20 RX ADMIN — MYCOPHENOLATE MOFETIL 500 MILLIGRAM(S): 250 CAPSULE ORAL at 06:47

## 2024-12-20 RX ADMIN — Medication 1: at 17:38

## 2024-12-20 RX ADMIN — Medication 100 MILLIGRAM(S): at 11:26

## 2024-12-20 RX ADMIN — ATORVASTATIN CALCIUM 10 MILLIGRAM(S): 40 TABLET, FILM COATED ORAL at 21:24

## 2024-12-20 RX ADMIN — ALLOPURINOL 100 MILLIGRAM(S): 100 TABLET ORAL at 11:25

## 2024-12-20 RX ADMIN — HEPARIN SODIUM 5000 UNIT(S): 1000 INJECTION, SOLUTION INTRAVENOUS; SUBCUTANEOUS at 06:45

## 2024-12-20 RX ADMIN — Medication 1 TABLET(S): at 11:25

## 2024-12-20 RX ADMIN — TACROLIMUS 4 MILLIGRAM(S): 0.5 CAPSULE ORAL at 06:46

## 2024-12-20 RX ADMIN — Medication 1000 MILLIGRAM(S): at 21:25

## 2024-12-20 RX ADMIN — MYCOPHENOLATE MOFETIL 500 MILLIGRAM(S): 250 CAPSULE ORAL at 17:39

## 2024-12-20 RX ADMIN — Medication 1 MILLIGRAM(S): at 11:26

## 2024-12-20 RX ADMIN — Medication 100 MILLIGRAM(S): at 06:46

## 2024-12-20 RX ADMIN — Medication 1: at 12:20

## 2024-12-20 RX ADMIN — NIFEDIPINE 60 MILLIGRAM(S): 60 TABLET, EXTENDED RELEASE ORAL at 06:45

## 2024-12-20 RX ADMIN — HEPARIN SODIUM 5000 UNIT(S): 1000 INJECTION, SOLUTION INTRAVENOUS; SUBCUTANEOUS at 21:23

## 2024-12-20 RX ADMIN — HEPARIN SODIUM 5000 UNIT(S): 1000 INJECTION, SOLUTION INTRAVENOUS; SUBCUTANEOUS at 13:54

## 2024-12-20 RX ADMIN — CEFTRIAXONE SODIUM 100 MILLIGRAM(S): 1 INJECTION, POWDER, FOR SOLUTION INTRAMUSCULAR; INTRAVENOUS at 13:54

## 2024-12-20 RX ADMIN — Medication 5 MILLIGRAM(S): at 06:46

## 2024-12-20 RX ADMIN — CYANOCOBALAMIN 1000 MICROGRAM(S): 1000 INJECTION, SOLUTION INTRAMUSCULAR; SUBCUTANEOUS at 11:26

## 2024-12-20 NOTE — CONSULT NOTE ADULT - SUBJECTIVE AND OBJECTIVE BOX
Patient is a 74y old  Male who presents with a chief complaint of LUE swelling (20 Dec 2024 13:54)      HPI:  This is a 73 y/o male PMHx bilateral RCC (Clear cell) s/p R +L nephrectomy, prostate CA s/p XRT, ESRD previously on HD s/p renal transplant (DDRT +HCV) now w/ CKD3b, Hydronephrosis of transplant kidney 2/2 ureteral stricture s/p ureteral stent, T2DM, HTN, HLD, HCV s/p treatment, bilateral TKR, and hyperhomocysteinemia who presents for LUE swelling. He has been having LUE swelling for the past 4 days, that progressively worsened. Denies redness, tenderness or warmth. Went to see his oncologist yesterday who saw the LUE swelling and recommended he come to the ED here for further evaluation given his history of LUE AVF clot and hyperhomocysteinemia. Patient otherwise feels well, denies any other symptoms, feels well.     In the ED, he was afebrile and hemodynamically stable, saturating well on RA. CBC w/ baseline normocytic anemia w/ Hb 11.1, CMP w/ evidence of  CHELA on CKD4 w/ Cr 3.43 (baseline 2.2-2.9). UA w/ large LE, 281 WBC, bacteria TNTC. US Doppler of the LUE negative for thrombosis.  (18 Dec 2024 02:29)     prior hospital charts reviewed [  ]  primary team notes reviewed [  ]  other consultant notes reviewed [  ]    PAST MEDICAL & SURGICAL HISTORY:  HTN (hypertension)      Type 2 diabetes mellitus  dx:       ESRD (end stage renal disease)  On Dialysis '  to 2018      Hepatitis C  In Donor Kidney: patient received treatment for Hep. C : post treatment: patient  tested Negative for Hep C      Benign prostatic hypertrophy      Anal fissure  dx: '    No surgery      Bowel obstruction  '    surgically treated w/ ileostomy; since reversed      Medial meniscus tear    Right      Renal cell carcinoma  s/p b/l nephrectomy and renal transplant in 2018      Prostate cancer  s/p RT      Ureteral stricture of kidney transplant  2018      COVID-19 virus infection  2021, asymptomatic      Bilateral cataracts      Anemia secondary to renal failure      Urine retention      Other complication of kidney transplant      OA (osteoarthritis)      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 s/p Reversal      S/P right knee arthroscopy        Kidney transplant recipient  2018  @ [facility] :  +  Hep C Donor      H/O colonoscopy      S/P TURP (transurethral resection of prostate)  and Transplant Kidney Stent Replacemernt, 21      S/P anal fissurectomy  and chemical denervation  with biopsy and fulguration of anal lesions, 2021      Anal condyloma  S/P excision; 22      S/P total knee replacement, left          Allergies  No Known Allergies    ANTIMICROBIALS (past 90 days)  MEDICATIONS  (STANDING):    cefTRIAXone   IVPB   100 mL/Hr IV Intermittent (24 @ 13:54)    trimethoprim   80 mG/sulfamethoxazole 400 mG   1 Tablet(s) Oral (24 @ 11:25)   1 Tablet(s) Oral (24 @ 11:42)   1 Tablet(s) Oral (24 @ 11:39)      ANTIMICROBIALS:    trimethoprim   80 mG/sulfamethoxazole 400 mG 1 daily    OTHER MEDS: MEDICATIONS  (STANDING):  acetaminophen     Tablet .. 650 every 6 hours PRN  allopurinol 100 daily  atorvastatin 10 at bedtime  dextrose 50% Injectable 25 once  dextrose 50% Injectable 12.5 once  dextrose 50% Injectable 25 once  dextrose Oral Gel 15 once PRN  glucagon  Injectable 1 once  heparin   Injectable 5000 every 8 hours  insulin lispro (ADMELOG) corrective regimen sliding scale  three times a day before meals  melatonin 3 at bedtime PRN  metoprolol succinate  daily  mycophenolate mofetil 500 two times a day  niacin SR 1000 at bedtime  NIFEdipine XL 60 daily  predniSONE   Tablet 5 daily  tacrolimus ER Tablet (ENVARSUS XR) 4 daily    SOCIAL HISTORY:   hx smoking  non-smoker    FAMILY HISTORY:  FH: breast cancer (Mother)    FH: type 2 diabetes (Father)    FH: heart attack (Sibling)  age 54 at death      REVIEW OF SYSTEMS  [  ] ROS unobtainable because:    [  ] All other systems negative except as noted below:	    Constitutional:  [ ] fever [ ] chills  [ ] weight loss  [ ] weakness  Skin:  [ ] rash [ ] phlebitis	  Eyes: [ ] icterus [ ] pain  [ ] discharge	  ENMT: [ ] sore throat  [ ] thrush [ ] ulcers [ ] exudates  Respiratory: [ ] dyspnea [ ] hemoptysis [ ] cough [ ] sputum	  Cardiovascular:  [ ] chest pain [ ] palpitations [ ] edema	  Gastrointestinal:  [ ] nausea [ ] vomiting [ ] diarrhea [ ] constipation [ ] pain	  Genitourinary:  [ ] dysuria [ ] frequency [ ] hematuria [ ] discharge [ ] flank pain  [ ] incontinence  Musculoskeletal:  [ ] myalgias [ ] arthralgias [ ] arthritis  [ ] back pain  Neurological:  [ ] headache [ ] seizures  [ ] confusion/altered mental status  Psychiatric:  [ ] anxiety [ ] depression	  Hematology/Lymphatics:  [ ] lymphadenopathy  Endocrine:  [ ] adrenal [ ] thyroid  Allergic/Immunologic:	 [ ] transplant [ ] seasonal    Vital Signs Last 24 Hrs  T(F): 98.2 (24 @ 16:26), Max: 98.9 (24 @ 19:12)  Vital Signs Last 24 Hrs  HR: 70 (24 @ 16:26) (68 - 74)  BP: 157/77 (24 @ 16:26) (143/75 - 171/74)  RR: 18 (24 @ 16:26)  SpO2: 99% (24 @ 16:26) (95% - 100%)  Wt(kg): --    PHYSICAL EXAMINATION:  General: Alert and Awake, NAD  HEENT: PERRL, EOMI, No subconjunctival hemorrhages, Oropharynx Clear, MMM  Neck: Supple, No YOCASTA  Cardiac: RRR, No M/R/G  Resp: CTAB, No Wh/Rh/Ra  Abdomen: NBS, NT/ND, No HSM, No rigidity or guarding  MSK: No LE edema. No stigmata of IE. No evidence of phlebitis. No evidence of synovitis.  : No armenta  Skin: No rashes or lesions. Skin is warm and dry to the touch.   Neuro: Alert and Awake. CN 2-12 Grossly intact. Moves all four extremities spontaneously.  Psych: Calm, Pleasant, Cooperative                          10.5   5.90  )-----------( 164      ( 19 Dec 2024 07:13 )             33.5         138  |  104  |  31[H]  ----------------------------<  71  4.6   |  24  |  3.69[H]    Ca    9.4      19 Dec 2024 07:12    TPro  5.9[L]  /  Alb  3.3  /  TBili  0.3  /  DBili  x   /  AST  15  /  ALT  7[L]  /  AlkPhos  67  12-19    Urinalysis Basic - ( 19 Dec 2024 07:12 )    Color: x / Appearance: x / SG: x / pH: x  Gluc: 71 mg/dL / Ketone: x  / Bili: x / Urobili: x   Blood: x / Protein: x / Nitrite: x   Leuk Esterase: x / RBC: x / WBC x   Sq Epi: x / Non Sq Epi: x / Bacteria: x    MICROBIOLOGY:  Culture - Urine (collected 18 Dec 2024 01:51)  Source: Clean Catch Clean Catch (Midstream)  Final Report (20 Dec 2024 17:18):    >100,000 CFU/ml Klebsiella pneumoniae    Multiple Morphological Strains  Organism: Klebsiella pneumoniae  Klebsiella pneumoniae (20 Dec 2024 17:18)  Organism: Klebsiella pneumoniae (20 Dec 2024 17:18)      Method Type: AUTUMN      -  Amoxicillin/Clavulanic Acid: S <=8/4      -  Ampicillin: R 16 These ampicillin results predict results for amoxicillin      -  Ampicillin/Sulbactam: S <=4/2      -  Aztreonam: S <=4      -  Cefazolin: S <=2 For uncomplicated UTI with K. pneumoniae, E. coli, or P. mirablis: AUTUMN <=16 is sensitive and AUTUMN >=32 is resistant. This also predicts results for oral agents cefaclor, cefdinir, cefpodoxime, cefprozil, cefuroxime axetil, cephalexin and locarbef for uncomplicated UTI. Note that some isolates may be susceptible to these agents while testing resistant to cefazolin.      -  Cefepime: S <=2      -  Cefoxitin: S <=8      -  Ceftriaxone: S <=1      -  Cefuroxime: S <=4      -  Ciprofloxacin: S <=0.25      -  Ertapenem: S <=0.5      -  Gentamicin: S <=2      -  Imipenem: S <=1      -  Levofloxacin: S <=0.5      -  Meropenem: S <=1      -  Nitrofurantoin: I 64 Should not be used to treat pyelonephritis      -  Piperacillin/Tazobactam: S <=8      -  Tobramycin: S <=2      -  Trimethoprim/Sulfamethoxazole: R >2/38  Organism: Klebsiella pneumoniae (20 Dec 2024 17:18)      Method Type: AUTUMN      -  Amoxicillin/Clavulanic Acid: S <=8/4      -  Ampicillin: R 16 These ampicillin results predict results for amoxicillin      -  Ampicillin/Sulbactam: S <=4/2      -  Aztreonam: S <=4      -  Cefazolin: S <=2 For uncomplicated UTI with K. pneumoniae, E. coli, or P. mirablis: AUTUMN <=16 is sensitive and AUTUMN >=32 is resistant. This also predicts results for oral agents cefaclor, cefdinir, cefpodoxime, cefprozil, cefuroxime axetil, cephalexin and locarbef for uncomplicated UTI. Note that some isolates may be susceptible to these agents while testing resistant to cefazolin.      -  Cefepime: S <=2      -  Cefoxitin: S <=8      -  Ceftriaxone: S <=1      -  Cefuroxime: S <=4      -  Ciprofloxacin: S <=0.25      -  Ertapenem: S <=0.5      -  Gentamicin: S <=2      -  Imipenem: S <=1      -  Levofloxacin: S <=0.5      -  Meropenem: S <=1      -  Nitrofurantoin: S <=32 Should not be used to treat pyelonephritis      -  Piperacillin/Tazobactam: S <=8      -  Tobramycin: S <=2      -  Trimethoprim/Sulfamethoxazole: R >                    RADIOLOGY:    <The imaging below has been reviewed and visualized by me independently. Findings as detailed in report below>     Patient is a 74y old  Male who presents with a chief complaint of LUE swelling (20 Dec 2024 13:54)      HPI:  This is a 75 y/o male PMHx bilateral RCC (Clear cell) s/p R +L nephrectomy, prostate CA s/p XRT, ESRD previously on HD s/p renal transplant (DDRT +HCV) now w/ CKD3b, Hydronephrosis of transplant kidney 2/2 ureteral stricture s/p ureteral stent, T2DM, HTN, HLD, HCV s/p treatment, bilateral TKR, and hyperhomocysteinemia who presents for LUE swelling. He has been having LUE swelling for the past 4 days, that progressively worsened. Denies redness, tenderness or warmth. Went to see his oncologist yesterday who saw the LUE swelling and recommended he come to the ED here for further evaluation given his history of LUE AVF clot and hyperhomocysteinemia. Patient otherwise feels well, denies any other symptoms, feels well.     UA () 281 WBC  UCx () >100K Klebsiella pneumoniae  Transplant Kidney US () No evidence of a significant renal artery stenosis. Moderate hydroureteronephrosis of the RLQ renal transplant.     prior hospital charts reviewed [  ]  primary team notes reviewed [ x ]  other consultant notes reviewed [ x ]    PAST MEDICAL & SURGICAL HISTORY:  HTN (hypertension)      Type 2 diabetes mellitus  dx:       ESRD (end stage renal disease)  On Dialysis '  to 2018      Hepatitis C  In Donor Kidney: patient received treatment for Hep. C : post treatment: patient  tested Negative for Hep C      Benign prostatic hypertrophy      Anal fissure  dx: '    No surgery      Bowel obstruction  '    surgically treated w/ ileostomy; since reversed      Medial meniscus tear    Right      Renal cell carcinoma  s/p b/l nephrectomy and renal transplant in 2018      Prostate cancer  s/p RT      Ureteral stricture of kidney transplant  2018      COVID-19 virus infection  2021, asymptomatic      Bilateral cataracts      Anemia secondary to renal failure      Urine retention      Other complication of kidney transplant      OA (osteoarthritis)      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 s/p Reversal      S/P right knee arthroscopy        Kidney transplant recipient  2018  @ [facility] :  +  Hep C Donor      H/O colonoscopy      S/P TURP (transurethral resection of prostate)  and Transplant Kidney Stent Replacemernt, 21      S/P anal fissurectomy  and chemical denervation  with biopsy and fulguration of anal lesions, 2021      Anal condyloma  S/P excision; 22      S/P total knee replacement, left          Allergies  No Known Allergies    ANTIMICROBIALS (past 90 days)  MEDICATIONS  (STANDING):    cefTRIAXone   IVPB   100 mL/Hr IV Intermittent (24 @ 13:54)    trimethoprim   80 mG/sulfamethoxazole 400 mG   1 Tablet(s) Oral (24 @ 11:25)   1 Tablet(s) Oral (24 @ 11:42)   1 Tablet(s) Oral (24 @ 11:39)      ANTIMICROBIALS:    trimethoprim   80 mG/sulfamethoxazole 400 mG 1 daily    OTHER MEDS: MEDICATIONS  (STANDING):  acetaminophen     Tablet .. 650 every 6 hours PRN  allopurinol 100 daily  atorvastatin 10 at bedtime  dextrose 50% Injectable 25 once  dextrose 50% Injectable 12.5 once  dextrose 50% Injectable 25 once  dextrose Oral Gel 15 once PRN  glucagon  Injectable 1 once  heparin   Injectable 5000 every 8 hours  insulin lispro (ADMELOG) corrective regimen sliding scale  three times a day before meals  melatonin 3 at bedtime PRN  metoprolol succinate  daily  mycophenolate mofetil 500 two times a day  niacin SR 1000 at bedtime  NIFEdipine XL 60 daily  predniSONE   Tablet 5 daily  tacrolimus ER Tablet (ENVARSUS XR) 4 daily    SOCIAL HISTORY: no smoking or etoh use.     FAMILY HISTORY:  FH: breast cancer (Mother)    FH: type 2 diabetes (Father)    FH: heart attack (Sibling)  age 54 at death      REVIEW OF SYSTEMS  [  ] ROS unobtainable because:    [ x ] All other systems negative except as noted below:	    Constitutional:  [ ] fever [ ] chills  [ ] weight loss  [ ] weakness  Skin:  [ ] rash [ ] phlebitis	  Eyes: [ ] icterus [ ] pain  [ ] discharge	  ENMT: [ ] sore throat  [ ] thrush [ ] ulcers [ ] exudates  Respiratory: [ ] dyspnea [ ] hemoptysis [ ] cough [ ] sputum	  Cardiovascular:  [ ] chest pain [ ] palpitations [ ] edema	  Gastrointestinal:  [ ] nausea [ ] vomiting [ ] diarrhea [ ] constipation [ ] pain	  Genitourinary:  [ ] dysuria [ ] frequency [ ] hematuria [ ] discharge [ ] flank pain  [ ] incontinence  Musculoskeletal:  [ ] myalgias [ ] arthralgias [ ] arthritis  [ ] back pain  Neurological:  [ ] headache [ ] seizures  [ ] confusion/altered mental status  Psychiatric:  [ ] anxiety [ ] depression	  Hematology/Lymphatics:  [ ] lymphadenopathy  Endocrine:  [ ] adrenal [ ] thyroid  Allergic/Immunologic:	 [ ] transplant [ ] seasonal    Vital Signs Last 24 Hrs  T(F): 98.2 (24 @ 16:26), Max: 98.9 (24 @ 19:12)  Vital Signs Last 24 Hrs  HR: 70 (24 @ 16:26) (68 - 74)  BP: 157/77 (24 @ 16:26) (143/75 - 171/74)  RR: 18 (24 @ 16:26)  SpO2: 99% (24 @ 16:26) (95% - 100%)  Wt(kg): --    PHYSICAL EXAMINATION:  General: Alert and Awake, NAD  HEENT: PERRL, EOMI, No subconjunctival hemorrhages, Oropharynx Clear, MMM  Neck: Supple, No YOCASTA  Cardiac: RRR, No M/R/G  Resp: CTAB, No Wh/Rh/Ra  Abdomen: NBS, NT/ND, No HSM, No rigidity or guarding  MSK: No LE edema. No stigmata of IE. No evidence of phlebitis. No evidence of synovitis.  : No armenta  Skin: No rashes or lesions. Skin is warm and dry to the touch.   Neuro: Alert and Awake. CN 2-12 Grossly intact. Moves all four extremities spontaneously.  Psych: Calm, Pleasant, Cooperative                          10.5   5.90  )-----------( 164      ( 19 Dec 2024 07:13 )             33.5         138  |  104  |  31[H]  ----------------------------<  71  4.6   |  24  |  3.69[H]    Ca    9.4      19 Dec 2024 07:12    TPro  5.9[L]  /  Alb  3.3  /  TBili  0.3  /  DBili  x   /  AST  15  /  ALT  7[L]  /  AlkPhos  67      Urinalysis Basic - ( 19 Dec 2024 07:12 )    Color: x / Appearance: x / SG: x / pH: x  Gluc: 71 mg/dL / Ketone: x  / Bili: x / Urobili: x   Blood: x / Protein: x / Nitrite: x   Leuk Esterase: x / RBC: x / WBC x   Sq Epi: x / Non Sq Epi: x / Bacteria: x    MICROBIOLOGY:  Culture - Urine (collected 18 Dec 2024 01:51)  Source: Clean Catch Clean Catch (Midstream)  Final Report (20 Dec 2024 17:18):    >100,000 CFU/ml Klebsiella pneumoniae    Multiple Morphological Strains  Organism: Klebsiella pneumoniae  Klebsiella pneumoniae (20 Dec 2024 17:18)  Organism: Klebsiella pneumoniae (20 Dec 2024 17:18)      Method Type: AUTUMN      -  Amoxicillin/Clavulanic Acid: S <=8/4      -  Ampicillin: R 16 These ampicillin results predict results for amoxicillin      -  Ampicillin/Sulbactam: S <=4/2      -  Aztreonam: S <=4      -  Cefazolin: S <=2 For uncomplicated UTI with K. pneumoniae, E. coli, or P. mirablis: AUTUMN <=16 is sensitive and AUTUMN >=32 is resistant. This also predicts results for oral agents cefaclor, cefdinir, cefpodoxime, cefprozil, cefuroxime axetil, cephalexin and locarbef for uncomplicated UTI. Note that some isolates may be susceptible to these agents while testing resistant to cefazolin.      -  Cefepime: S <=2      -  Cefoxitin: S <=8      -  Ceftriaxone: S <=1      -  Cefuroxime: S <=4      -  Ciprofloxacin: S <=0.25      -  Ertapenem: S <=0.5      -  Gentamicin: S <=2      -  Imipenem: S <=1      -  Levofloxacin: S <=0.5      -  Meropenem: S <=1      -  Nitrofurantoin: I 64 Should not be used to treat pyelonephritis      -  Piperacillin/Tazobactam: S <=8      -  Tobramycin: S <=2      -  Trimethoprim/Sulfamethoxazole: R >2/38  Organism: Klebsiella pneumoniae (20 Dec 2024 17:18)      Method Type: AUTUMN      -  Amoxicillin/Clavulanic Acid: S <=8/4      -  Ampicillin: R 16 These ampicillin results predict results for amoxicillin      -  Ampicillin/Sulbactam: S <=4/2      -  Aztreonam: S <=4      -  Cefazolin: S <=2 For uncomplicated UTI with K. pneumoniae, E. coli, or P. mirablis: AUTUMN <=16 is sensitive and AUTUMN >=32 is resistant. This also predicts results for oral agents cefaclor, cefdinir, cefpodoxime, cefprozil, cefuroxime axetil, cephalexin and locarbef for uncomplicated UTI. Note that some isolates may be susceptible to these agents while testing resistant to cefazolin.      -  Cefepime: S <=2      -  Cefoxitin: S <=8      -  Ceftriaxone: S <=1      -  Cefuroxime: S <=4      -  Ciprofloxacin: S <=0.25      -  Ertapenem: S <=0.5      -  Gentamicin: S <=2      -  Imipenem: S <=1      -  Levofloxacin: S <=0.5      -  Meropenem: S <=1      -  Nitrofurantoin: S <=32 Should not be used to treat pyelonephritis      -  Piperacillin/Tazobactam: S <=8      -  Tobramycin: S <=2      -  Trimethoprim/Sulfamethoxazole: R >                    RADIOLOGY:    <The imaging below has been reviewed and visualized by me independently. Findings as detailed in report below>    < from: US Trans Kidney w/ Doppler, Right (24 @ 00:58) >  IMPRESSION:    No evidence of a significant renal artery stenosis. Moderate   hydroureteronephrosis of the RLQ renal transplant.    < end of copied text >

## 2024-12-20 NOTE — PROGRESS NOTE ADULT - SUBJECTIVE AND OBJECTIVE BOX
Patient is a 74y old  Male who presents with a chief complaint of LUE swelling (19 Dec 2024 23:28)      SUBJECTIVE / OVERNIGHT EVENTS: pt feels ok, denies cp, sob, chills     MEDICATIONS  (STANDING):  allopurinol 100 milliGRAM(s) Oral daily  atorvastatin 10 milliGRAM(s) Oral at bedtime  cefTRIAXone   IVPB      cefTRIAXone   IVPB 1000 milliGRAM(s) IV Intermittent once  citric acid/sodium citrate Solution 15 milliLiter(s) Oral daily  cyanocobalamin 1000 MICROGram(s) Oral daily  dextrose 5%. 1000 milliLiter(s) (50 mL/Hr) IV Continuous <Continuous>  dextrose 5%. 1000 milliLiter(s) (100 mL/Hr) IV Continuous <Continuous>  dextrose 50% Injectable 25 Gram(s) IV Push once  dextrose 50% Injectable 12.5 Gram(s) IV Push once  dextrose 50% Injectable 25 Gram(s) IV Push once  folic acid 1 milliGRAM(s) Oral daily  glucagon  Injectable 1 milliGRAM(s) IntraMuscular once  heparin   Injectable 5000 Unit(s) SubCutaneous every 8 hours  insulin lispro (ADMELOG) corrective regimen sliding scale   SubCutaneous three times a day before meals  metoprolol succinate  milliGRAM(s) Oral daily  mycophenolate mofetil 500 milliGRAM(s) Oral two times a day  niacin SR 1000 milliGRAM(s) Oral at bedtime  NIFEdipine XL 60 milliGRAM(s) Oral daily  predniSONE   Tablet 5 milliGRAM(s) Oral daily  pyridoxine 100 milliGRAM(s) Oral daily  tacrolimus ER Tablet (ENVARSUS XR) 4 milliGRAM(s) Oral daily  trimethoprim   80 mG/sulfamethoxazole 400 mG 1 Tablet(s) Oral daily    MEDICATIONS  (PRN):  acetaminophen     Tablet .. 650 milliGRAM(s) Oral every 6 hours PRN Temp greater or equal to 38C (100.4F), Mild Pain (1 - 3)  dextrose Oral Gel 15 Gram(s) Oral once PRN Blood Glucose LESS THAN 70 milliGRAM(s)/deciliter  melatonin 3 milliGRAM(s) Oral at bedtime PRN Insomnia        CAPILLARY BLOOD GLUCOSE      POCT Blood Glucose.: 191 mg/dL (20 Dec 2024 12:13)  POCT Blood Glucose.: 101 mg/dL (20 Dec 2024 08:37)  POCT Blood Glucose.: 114 mg/dL (19 Dec 2024 21:39)  POCT Blood Glucose.: 154 mg/dL (19 Dec 2024 16:53)    I&O's Summary    19 Dec 2024 07:01  -  20 Dec 2024 07:00  --------------------------------------------------------  IN: 1260 mL / OUT: 1200 mL / NET: 60 mL    20 Dec 2024 07:01  -  20 Dec 2024 13:54  --------------------------------------------------------  IN: 480 mL / OUT: 0 mL / NET: 480 mL        PHYSICAL EXAM:  GENERAL: NAD, well-developed  HEAD:  Atraumatic, Normocephalic  EYES: EOMI, PERRLA, conjunctiva and sclera clear  NECK: Supple, No JVD  CHEST/LUNG: Clear to auscultation bilaterally; No wheeze  HEART: Regular rate and rhythm; No murmurs, rubs, or gallops  ABDOMEN: Soft, Nontender, Nondistended; Bowel sounds present  EXTREMITIES:  2+ Peripheral Pulses, No clubbing, cyanosis, or edema  PSYCH: AAOx3  NEUROLOGY: non-focal  SKIN: No rashes or lesions    LABS:                        10.5   5.90  )-----------( 164      ( 19 Dec 2024 07:13 )             33.5     12-19    138  |  104  |  31[H]  ----------------------------<  71  4.6   |  24  |  3.69[H]    Ca    9.4      19 Dec 2024 07:12    TPro  5.9[L]  /  Alb  3.3  /  TBili  0.3  /  DBili  x   /  AST  15  /  ALT  7[L]  /  AlkPhos  67  12-19          Urinalysis Basic - ( 19 Dec 2024 07:12 )    Color: x / Appearance: x / SG: x / pH: x  Gluc: 71 mg/dL / Ketone: x  / Bili: x / Urobili: x   Blood: x / Protein: x / Nitrite: x   Leuk Esterase: x / RBC: x / WBC x   Sq Epi: x / Non Sq Epi: x / Bacteria: x        RADIOLOGY & ADDITIONAL TESTS:    Imaging Personally Reviewed:    Consultant(s) Notes Reviewed:      Care Discussed with Consultants/Other Providers:

## 2024-12-20 NOTE — CONSULT NOTE ADULT - ASSESSMENT
74 year old male PMHx bilateral RCC (Clear cell) s/p R +L nephrectomy, prostate CA s/p XRT, ESRD previously on HD s/p renal transplant (DDRT +HCV) now w/ CKD3b, Hydronephrosis of transplant kidney 2/2 ureteral stricture s/p ureteral stent, T2DM, HTN, HLD, HCV s/p treatment, bilateral TKR, and hyperhomocysteinemia who presents for LUE swelling.     UA (12/18) 281 WBC  UCx (12/18) >100K Klebsiella pneumoniae  Transplant Kidney US (12/18) No evidence of a significant renal artery stenosis. Moderate hydroureteronephrosis of the RLQ renal transplant.    Review of outpatient [facility] labs reveals that all of patient's urinalyses over the past year have had pyuria and patient's urine cultures have generally grown Klebsiella pneumoniae. Patient at the present without any urinary or systemic symptoms and without leukocytosis. Favor this positive culture is representative of asymptomatic bacteriuria.     #Positive Urine Culture, Renal Transplant Recipient  --s/p Ceftriaxone dose. Favor stopping at this point as positive UCx likely representative of asymptomatic bacteriuria  --Will require antibiotic prophylaxis prior to any urologic procedure such as ureteral stent exchange  --Follow up on CT A/P    Dr. Nate Bay will be covering the patient starting from tomorrow. Please reach out to her for further questions and follow up.     Zackery Hall M.D.  [facility] Division of Infectious Disease  8AM-5PM Monday - Friday: Available on [employer] Teams  After Hours and Holidays (or if no response on [employer] Teams): Please contact the Infectious Diseases Office at [phone]     The above assessment and plan were discussed with medicine NP

## 2024-12-21 LAB
ANION GAP SERPL CALC-SCNC: 13 MMOL/L — SIGNIFICANT CHANGE UP (ref 5–17)
BUN SERPL-MCNC: 34 MG/DL — HIGH (ref 7–23)
CALCIUM SERPL-MCNC: 9.3 MG/DL — SIGNIFICANT CHANGE UP (ref 8.4–10.5)
CHLORIDE SERPL-SCNC: 103 MMOL/L — SIGNIFICANT CHANGE UP (ref 96–108)
CO2 SERPL-SCNC: 21 MMOL/L — LOW (ref 22–31)
CREAT SERPL-MCNC: 4.23 MG/DL — HIGH (ref 0.5–1.3)
EGFR: 14 ML/MIN/1.73M2 — LOW
GLUCOSE BLDC GLUCOMTR-MCNC: 125 MG/DL — HIGH (ref 70–99)
GLUCOSE BLDC GLUCOMTR-MCNC: 156 MG/DL — HIGH (ref 70–99)
GLUCOSE BLDC GLUCOMTR-MCNC: 158 MG/DL — HIGH (ref 70–99)
GLUCOSE BLDC GLUCOMTR-MCNC: 209 MG/DL — HIGH (ref 70–99)
GLUCOSE SERPL-MCNC: 95 MG/DL — SIGNIFICANT CHANGE UP (ref 70–99)
HCT VFR BLD CALC: 33 % — LOW (ref 39–50)
HGB BLD-MCNC: 10.1 G/DL — LOW (ref 13–17)
MCHC RBC-ENTMCNC: 28.2 PG — SIGNIFICANT CHANGE UP (ref 27–34)
MCHC RBC-ENTMCNC: 30.6 G/DL — LOW (ref 32–36)
MCV RBC AUTO: 92.2 FL — SIGNIFICANT CHANGE UP (ref 80–100)
NRBC # BLD: 0 /100 WBCS — SIGNIFICANT CHANGE UP (ref 0–0)
PLATELET # BLD AUTO: 170 K/UL — SIGNIFICANT CHANGE UP (ref 150–400)
POTASSIUM SERPL-MCNC: 4.5 MMOL/L — SIGNIFICANT CHANGE UP (ref 3.5–5.3)
POTASSIUM SERPL-SCNC: 4.5 MMOL/L — SIGNIFICANT CHANGE UP (ref 3.5–5.3)
RBC # BLD: 3.58 M/UL — LOW (ref 4.2–5.8)
RBC # FLD: 14.6 % — HIGH (ref 10.3–14.5)
SODIUM SERPL-SCNC: 137 MMOL/L — SIGNIFICANT CHANGE UP (ref 135–145)
WBC # BLD: 5.56 K/UL — SIGNIFICANT CHANGE UP (ref 3.8–10.5)
WBC # FLD AUTO: 5.56 K/UL — SIGNIFICANT CHANGE UP (ref 3.8–10.5)

## 2024-12-21 PROCEDURE — 99232 SBSQ HOSP IP/OBS MODERATE 35: CPT

## 2024-12-21 PROCEDURE — 99233 SBSQ HOSP IP/OBS HIGH 50: CPT

## 2024-12-21 PROCEDURE — 74176 CT ABD & PELVIS W/O CONTRAST: CPT | Mod: 26

## 2024-12-21 RX ADMIN — Medication 15 MILLILITER(S): at 15:29

## 2024-12-21 RX ADMIN — Medication 100 MILLIGRAM(S): at 05:21

## 2024-12-21 RX ADMIN — HEPARIN SODIUM 5000 UNIT(S): 1000 INJECTION, SOLUTION INTRAVENOUS; SUBCUTANEOUS at 21:21

## 2024-12-21 RX ADMIN — HEPARIN SODIUM 5000 UNIT(S): 1000 INJECTION, SOLUTION INTRAVENOUS; SUBCUTANEOUS at 05:22

## 2024-12-21 RX ADMIN — CYANOCOBALAMIN 1000 MICROGRAM(S): 1000 INJECTION, SOLUTION INTRAMUSCULAR; SUBCUTANEOUS at 11:31

## 2024-12-21 RX ADMIN — Medication 5 MILLIGRAM(S): at 05:21

## 2024-12-21 RX ADMIN — ALLOPURINOL 100 MILLIGRAM(S): 100 TABLET ORAL at 11:31

## 2024-12-21 RX ADMIN — Medication 1000 MILLIGRAM(S): at 21:20

## 2024-12-21 RX ADMIN — Medication 2: at 12:53

## 2024-12-21 RX ADMIN — HEPARIN SODIUM 5000 UNIT(S): 1000 INJECTION, SOLUTION INTRAVENOUS; SUBCUTANEOUS at 13:14

## 2024-12-21 RX ADMIN — MYCOPHENOLATE MOFETIL 500 MILLIGRAM(S): 250 CAPSULE ORAL at 17:33

## 2024-12-21 RX ADMIN — ATORVASTATIN CALCIUM 10 MILLIGRAM(S): 40 TABLET, FILM COATED ORAL at 21:20

## 2024-12-21 RX ADMIN — MYCOPHENOLATE MOFETIL 500 MILLIGRAM(S): 250 CAPSULE ORAL at 05:21

## 2024-12-21 RX ADMIN — Medication 1 TABLET(S): at 11:31

## 2024-12-21 RX ADMIN — NIFEDIPINE 60 MILLIGRAM(S): 60 TABLET, EXTENDED RELEASE ORAL at 05:21

## 2024-12-21 RX ADMIN — Medication 1 MILLIGRAM(S): at 11:31

## 2024-12-21 RX ADMIN — Medication 1: at 17:34

## 2024-12-21 RX ADMIN — Medication 100 MILLIGRAM(S): at 11:31

## 2024-12-21 NOTE — PROGRESS NOTE ADULT - SUBJECTIVE AND OBJECTIVE BOX
Patient is a 74y old  Male who presents with a chief complaint of LUE swelling (20 Dec 2024 17:53)      SUBJECTIVE / OVERNIGHT EVENTS: pt feels better, denies cp, sob, abd pain, chills     MEDICATIONS  (STANDING):  allopurinol 100 milliGRAM(s) Oral daily  atorvastatin 10 milliGRAM(s) Oral at bedtime  citric acid/sodium citrate Solution 15 milliLiter(s) Oral daily  cyanocobalamin 1000 MICROGram(s) Oral daily  dextrose 5%. 1000 milliLiter(s) (50 mL/Hr) IV Continuous <Continuous>  dextrose 5%. 1000 milliLiter(s) (100 mL/Hr) IV Continuous <Continuous>  dextrose 50% Injectable 25 Gram(s) IV Push once  dextrose 50% Injectable 12.5 Gram(s) IV Push once  dextrose 50% Injectable 25 Gram(s) IV Push once  folic acid 1 milliGRAM(s) Oral daily  glucagon  Injectable 1 milliGRAM(s) IntraMuscular once  heparin   Injectable 5000 Unit(s) SubCutaneous every 8 hours  insulin lispro (ADMELOG) corrective regimen sliding scale   SubCutaneous three times a day before meals  metoprolol succinate  milliGRAM(s) Oral daily  mycophenolate mofetil 500 milliGRAM(s) Oral two times a day  niacin SR 1000 milliGRAM(s) Oral at bedtime  NIFEdipine XL 60 milliGRAM(s) Oral daily  predniSONE   Tablet 5 milliGRAM(s) Oral daily  pyridoxine 100 milliGRAM(s) Oral daily  tacrolimus ER Tablet (ENVARSUS XR) 2 milliGRAM(s) Oral daily  trimethoprim   80 mG/sulfamethoxazole 400 mG 1 Tablet(s) Oral daily    MEDICATIONS  (PRN):  acetaminophen     Tablet .. 650 milliGRAM(s) Oral every 6 hours PRN Temp greater or equal to 38C (100.4F), Mild Pain (1 - 3)  dextrose Oral Gel 15 Gram(s) Oral once PRN Blood Glucose LESS THAN 70 milliGRAM(s)/deciliter  melatonin 3 milliGRAM(s) Oral at bedtime PRN Insomnia        CAPILLARY BLOOD GLUCOSE      POCT Blood Glucose.: 125 mg/dL (21 Dec 2024 08:43)  POCT Blood Glucose.: 151 mg/dL (20 Dec 2024 21:36)  POCT Blood Glucose.: 182 mg/dL (20 Dec 2024 16:54)  POCT Blood Glucose.: 191 mg/dL (20 Dec 2024 12:13)    I&O's Summary    20 Dec 2024 07:01  -  21 Dec 2024 07:00  --------------------------------------------------------  IN: 720 mL / OUT: 0 mL / NET: 720 mL        PHYSICAL EXAM:  GENERAL: NAD, well-developed  HEAD:  Atraumatic, Normocephalic  EYES: EOMI, PERRLA, conjunctiva and sclera clear  NECK: Supple, No JVD  CHEST/LUNG: Clear to auscultation bilaterally; No wheeze  HEART: Regular rate and rhythm; No murmurs, rubs, or gallops  ABDOMEN: Soft, Nontender, Nondistended; Bowel sounds present  EXTREMITIES:  2+ Peripheral Pulses, No clubbing, cyanosis, or edema  PSYCH: AAOx3  NEUROLOGY: non-focal  SKIN: No rashes or lesions    LABS:                        10.1   5.56  )-----------( 170      ( 21 Dec 2024 07:30 )             33.0     12-21    137  |  103  |  34[H]  ----------------------------<  95  4.5   |  21[L]  |  4.23[H]    Ca    9.3      21 Dec 2024 07:28            Urinalysis Basic - ( 21 Dec 2024 07:28 )    Color: x / Appearance: x / SG: x / pH: x  Gluc: 95 mg/dL / Ketone: x  / Bili: x / Urobili: x   Blood: x / Protein: x / Nitrite: x   Leuk Esterase: x / RBC: x / WBC x   Sq Epi: x / Non Sq Epi: x / Bacteria: x        RADIOLOGY & ADDITIONAL TESTS:    Imaging Personally Reviewed:    Consultant(s) Notes Reviewed:      Care Discussed with Consultants/Other Providers:

## 2024-12-21 NOTE — PROGRESS NOTE ADULT - SUBJECTIVE AND OBJECTIVE BOX
Subjective  Denies fevers, chills, nausea, vomiting, SOB, CP. Tolerating diet.    Objective    Vital signs  T(F): , Max: 98.2 (12-20-24 @ 16:26)  HR: 84 (12-21-24 @ 09:11)  BP: 153/86 (12-21-24 @ 09:11)  SpO2: 97% (12-21-24 @ 09:11)  Wt(kg): --    Output         Gen: NAD  Abd: soft, nontender, nondistended  : No CVAT bilaterally    Labs      12-21 @ 07:30    WBC 5.56  / Hct 33.0  / SCr --       12-21 @ 07:28    WBC --    / Hct --    / SCr 4.23         Culture - Urine (collected 12-18-24 @ 01:51)  Source: Clean Catch Clean Catch (Midstream)  Final Report (12-20-24 @ 17:18):    >100,000 CFU/ml Klebsiella pneumoniae    Multiple Morphological Strains  Organism: Klebsiella pneumoniae  Klebsiella pneumoniae (12-20-24 @ 17:18)  Organism: Klebsiella pneumoniae (12-20-24 @ 17:18)      Method Type: AUTUMN      -  Amoxicillin/Clavulanic Acid: S <=8/4      -  Ampicillin: R 16 These ampicillin results predict results for amoxicillin      -  Ampicillin/Sulbactam: S <=4/2      -  Aztreonam: S <=4      -  Cefazolin: S <=2 For uncomplicated UTI with K. pneumoniae, E. coli, or P. mirablis: AUTUMN <=16 is sensitive and AUTUMN >=32 is resistant. This also predicts results for oral agents cefaclor, cefdinir, cefpodoxime, cefprozil, cefuroxime axetil, cephalexin and locarbef for uncomplicated UTI. Note that some isolates may be susceptible to these agents while testing resistant to cefazolin.      -  Cefepime: S <=2      -  Cefoxitin: S <=8      -  Ceftriaxone: S <=1      -  Cefuroxime: S <=4      -  Ciprofloxacin: S <=0.25      -  Ertapenem: S <=0.5      -  Gentamicin: S <=2      -  Imipenem: S <=1      -  Levofloxacin: S <=0.5      -  Meropenem: S <=1      -  Nitrofurantoin: I 64 Should not be used to treat pyelonephritis      -  Piperacillin/Tazobactam: S <=8      -  Tobramycin: S <=2      -  Trimethoprim/Sulfamethoxazole: R >2/38  Organism: Klebsiella pneumoniae (12-20-24 @ 17:18)      Method Type: AUTUMN      -  Amoxicillin/Clavulanic Acid: S <=8/4      -  Ampicillin: R 16 These ampicillin results predict results for amoxicillin      -  Ampicillin/Sulbactam: S <=4/2      -  Aztreonam: S <=4      -  Cefazolin: S <=2 For uncomplicated UTI with K. pneumoniae, E. coli, or P. mirablis: AUTUMN <=16 is sensitive and AUTUMN >=32 is resistant. This also predicts results for oral agents cefaclor, cefdinir, cefpodoxime, cefprozil, cefuroxime axetil, cephalexin and locarbef for uncomplicated UTI. Note that some isolates may be susceptible to these agents while testing resistant to cefazolin.      -  Cefepime: S <=2      -  Cefoxitin: S <=8      -  Ceftriaxone: S <=1      -  Cefuroxime: S <=4      -  Ciprofloxacin: S <=0.25      -  Ertapenem: S <=0.5      -  Gentamicin: S <=2      -  Imipenem: S <=1      -  Levofloxacin: S <=0.5      -  Meropenem: S <=1      -  Nitrofurantoin: S <=32 Should not be used to treat pyelonephritis      -  Piperacillin/Tazobactam: S <=8      -  Tobramycin: S <=2      -  Trimethoprim/Sulfamethoxazole: R >2/38        Urine Cx: ?  Blood Cx: ?    Imaging

## 2024-12-22 LAB
ANION GAP SERPL CALC-SCNC: 16 MMOL/L — SIGNIFICANT CHANGE UP (ref 5–17)
BUN SERPL-MCNC: 34 MG/DL — HIGH (ref 7–23)
CALCIUM SERPL-MCNC: 9.6 MG/DL — SIGNIFICANT CHANGE UP (ref 8.4–10.5)
CHLORIDE SERPL-SCNC: 104 MMOL/L — SIGNIFICANT CHANGE UP (ref 96–108)
CO2 SERPL-SCNC: 16 MMOL/L — LOW (ref 22–31)
CREAT SERPL-MCNC: 4.17 MG/DL — HIGH (ref 0.5–1.3)
EGFR: 14 ML/MIN/1.73M2 — LOW
GLUCOSE BLDC GLUCOMTR-MCNC: 141 MG/DL — HIGH (ref 70–99)
GLUCOSE BLDC GLUCOMTR-MCNC: 154 MG/DL — HIGH (ref 70–99)
GLUCOSE BLDC GLUCOMTR-MCNC: 212 MG/DL — HIGH (ref 70–99)
GLUCOSE BLDC GLUCOMTR-MCNC: 259 MG/DL — HIGH (ref 70–99)
GLUCOSE SERPL-MCNC: 114 MG/DL — HIGH (ref 70–99)
HCT VFR BLD CALC: 36.6 % — LOW (ref 39–50)
HGB BLD-MCNC: 11.3 G/DL — LOW (ref 13–17)
MCHC RBC-ENTMCNC: 28.7 PG — SIGNIFICANT CHANGE UP (ref 27–34)
MCHC RBC-ENTMCNC: 30.9 G/DL — LOW (ref 32–36)
MCV RBC AUTO: 92.9 FL — SIGNIFICANT CHANGE UP (ref 80–100)
NRBC # BLD: 0 /100 WBCS — SIGNIFICANT CHANGE UP (ref 0–0)
PLATELET # BLD AUTO: 175 K/UL — SIGNIFICANT CHANGE UP (ref 150–400)
POTASSIUM SERPL-MCNC: 4.7 MMOL/L — SIGNIFICANT CHANGE UP (ref 3.5–5.3)
POTASSIUM SERPL-SCNC: 4.7 MMOL/L — SIGNIFICANT CHANGE UP (ref 3.5–5.3)
RBC # BLD: 3.94 M/UL — LOW (ref 4.2–5.8)
RBC # FLD: 14.3 % — SIGNIFICANT CHANGE UP (ref 10.3–14.5)
SODIUM SERPL-SCNC: 136 MMOL/L — SIGNIFICANT CHANGE UP (ref 135–145)
TACROLIMUS SERPL-MCNC: 4.7 NG/ML — SIGNIFICANT CHANGE UP
WBC # BLD: 5.74 K/UL — SIGNIFICANT CHANGE UP (ref 3.8–10.5)
WBC # FLD AUTO: 5.74 K/UL — SIGNIFICANT CHANGE UP (ref 3.8–10.5)

## 2024-12-22 PROCEDURE — 99232 SBSQ HOSP IP/OBS MODERATE 35: CPT

## 2024-12-22 PROCEDURE — 99233 SBSQ HOSP IP/OBS HIGH 50: CPT

## 2024-12-22 RX ORDER — CIPROFLOXACIN HYDROCHLORIDE 500 MG/1
500 TABLET, FILM COATED ORAL EVERY 24 HOURS
Refills: 0 | Status: DISCONTINUED | OUTPATIENT
Start: 2024-12-22 | End: 2024-12-24

## 2024-12-22 RX ADMIN — CYANOCOBALAMIN 1000 MICROGRAM(S): 1000 INJECTION, SOLUTION INTRAMUSCULAR; SUBCUTANEOUS at 11:21

## 2024-12-22 RX ADMIN — Medication 1 TABLET(S): at 11:21

## 2024-12-22 RX ADMIN — Medication 5 MILLIGRAM(S): at 05:38

## 2024-12-22 RX ADMIN — CIPROFLOXACIN HYDROCHLORIDE 500 MILLIGRAM(S): 500 TABLET, FILM COATED ORAL at 16:12

## 2024-12-22 RX ADMIN — Medication 1 MILLIGRAM(S): at 11:21

## 2024-12-22 RX ADMIN — Medication 1000 MILLIGRAM(S): at 21:26

## 2024-12-22 RX ADMIN — ALLOPURINOL 100 MILLIGRAM(S): 100 TABLET ORAL at 11:21

## 2024-12-22 RX ADMIN — NIFEDIPINE 60 MILLIGRAM(S): 60 TABLET, EXTENDED RELEASE ORAL at 05:38

## 2024-12-22 RX ADMIN — HEPARIN SODIUM 5000 UNIT(S): 1000 INJECTION, SOLUTION INTRAVENOUS; SUBCUTANEOUS at 05:41

## 2024-12-22 RX ADMIN — Medication 3: at 13:23

## 2024-12-22 RX ADMIN — ATORVASTATIN CALCIUM 10 MILLIGRAM(S): 40 TABLET, FILM COATED ORAL at 21:26

## 2024-12-22 RX ADMIN — MYCOPHENOLATE MOFETIL 500 MILLIGRAM(S): 250 CAPSULE ORAL at 05:38

## 2024-12-22 RX ADMIN — HEPARIN SODIUM 5000 UNIT(S): 1000 INJECTION, SOLUTION INTRAVENOUS; SUBCUTANEOUS at 13:24

## 2024-12-22 RX ADMIN — Medication 100 MILLIGRAM(S): at 11:21

## 2024-12-22 RX ADMIN — Medication 15 MILLILITER(S): at 13:34

## 2024-12-22 RX ADMIN — Medication 100 MILLIGRAM(S): at 05:38

## 2024-12-22 RX ADMIN — HEPARIN SODIUM 5000 UNIT(S): 1000 INJECTION, SOLUTION INTRAVENOUS; SUBCUTANEOUS at 21:26

## 2024-12-22 RX ADMIN — Medication 2: at 17:13

## 2024-12-22 RX ADMIN — MYCOPHENOLATE MOFETIL 500 MILLIGRAM(S): 250 CAPSULE ORAL at 17:14

## 2024-12-22 RX ADMIN — TACROLIMUS 2 MILLIGRAM(S): 0.5 CAPSULE ORAL at 05:38

## 2024-12-22 NOTE — PROGRESS NOTE ADULT - SUBJECTIVE AND OBJECTIVE BOX
INFECTIOUS DISEASES FOLLOW UP-- [place]  [phone]    This is a follow up note for this  74yMale with  Acute renal failure        ROS:  CONSTITUTIONAL:  No fever, good appetite  CARDIOVASCULAR:  No chest pain or palpitations  RESPIRATORY:  No dyspnea  GASTROINTESTINAL:  No nausea, vomiting, diarrhea, or abdominal pain  GENITOURINARY:  No dysuria  NEUROLOGIC:  No headache,     Allergies    No Known Allergies    Intolerances        ANTIBIOTICS/RELEVANT:  antimicrobials  ciprofloxacin     Tablet 500 milliGRAM(s) Oral every 24 hours  trimethoprim   80 mG/sulfamethoxazole 400 mG 1 Tablet(s) Oral daily    immunologic:  mycophenolate mofetil 500 milliGRAM(s) Oral two times a day  tacrolimus ER Tablet (ENVARSUS XR) 2 milliGRAM(s) Oral daily    OTHER:  acetaminophen     Tablet .. 650 milliGRAM(s) Oral every 6 hours PRN  allopurinol 100 milliGRAM(s) Oral daily  atorvastatin 10 milliGRAM(s) Oral at bedtime  citric acid/sodium citrate Solution 15 milliLiter(s) Oral daily  cyanocobalamin 1000 MICROGram(s) Oral daily  dextrose 5%. 1000 milliLiter(s) IV Continuous <Continuous>  dextrose 5%. 1000 milliLiter(s) IV Continuous <Continuous>  dextrose 50% Injectable 25 Gram(s) IV Push once  dextrose 50% Injectable 12.5 Gram(s) IV Push once  dextrose 50% Injectable 25 Gram(s) IV Push once  dextrose Oral Gel 15 Gram(s) Oral once PRN  folic acid 1 milliGRAM(s) Oral daily  glucagon  Injectable 1 milliGRAM(s) IntraMuscular once  heparin   Injectable 5000 Unit(s) SubCutaneous every 8 hours  insulin lispro (ADMELOG) corrective regimen sliding scale   SubCutaneous three times a day before meals  melatonin 3 milliGRAM(s) Oral at bedtime PRN  metoprolol succinate  milliGRAM(s) Oral daily  niacin SR 1000 milliGRAM(s) Oral at bedtime  NIFEdipine XL 60 milliGRAM(s) Oral daily  predniSONE   Tablet 5 milliGRAM(s) Oral daily  pyridoxine 100 milliGRAM(s) Oral daily      Objective:  Vital Signs Last 24 Hrs  T(C): 36.7 (22 Dec 2024 09:49), Max: 36.9 (22 Dec 2024 04:35)  T(F): 98.1 (22 Dec 2024 09:49), Max: 98.4 (22 Dec 2024 04:35)  HR: 78 (22 Dec 2024 09:49) (78 - 89)  BP: 126/57 (22 Dec 2024 09:49) (126/57 - 160/80)  BP(mean): --  RR: 18 (22 Dec 2024 09:49) (18 - 18)  SpO2: 97% (22 Dec 2024 09:49) (95% - 97%)    Parameters below as of 22 Dec 2024 09:49  Patient On (Oxygen Delivery Method): room air        PHYSICAL EXAM:  Constitutional:no acute distress  Eyes:KATIUSKA, EOMI  Ear/Nose/Throat: no oral lesions, 	  Respiratory: clear BL  Cardiovascular: S1S2  Gastrointestinal:soft, (+) BS, no tenderness  Extremities:no e/e/c  No Lymphadenopathy  IV sites not inflammed.    LABS:                        11.3   5.74  )-----------( 175      ( 22 Dec 2024 07:13 )             36.6     12-22    136  |  104  |  34[H]  ----------------------------<  114[H]  4.7   |  16[L]  |  4.17[H]    Ca    9.6      22 Dec 2024 07:12        Culture - Urine (12.18.24 @ 01:51)    -  Imipenem: S <=1   -  Imipenem: S <=1   -  Levofloxacin: S <=0.5   -  Levofloxacin: S <=0.5   -  Meropenem: S <=1   -  Meropenem: S <=1   -  Nitrofurantoin: I 64 Should not be used to treat pyelonephritis   -  Nitrofurantoin: S <=32 Should not be used to treat pyelonephritis   -  Piperacillin/Tazobactam: S <=8   -  Piperacillin/Tazobactam: S <=8   -  Tobramycin: S <=2   -  Tobramycin: S <=2   -  Trimethoprim/Sulfamethoxazole: R >2/38   -  Trimethoprim/Sulfamethoxazole: R >2/38   -  Amoxicillin/Clavulanic Acid: S <=8/4   -  Amoxicillin/Clavulanic Acid: S <=8/4   -  Ampicillin: R 16 These ampicillin results predict results for amoxicillin   -  Ampicillin: R 16 These ampicillin results predict results for amoxicillin   -  Ampicillin/Sulbactam: S <=4/2   -  Ampicillin/Sulbactam: S <=4/2   -  Aztreonam: S <=4   -  Aztreonam: S <=4   -  Cefazolin: S <=2 For uncomplicated UTI with K. pneumoniae, E. coli, or P. mirablis: AUTUMN <=16 is sensitive and AUTUMN >=32 is resistant. This also predicts results for oral agents cefaclor, cefdinir, cefpodoxime, cefprozil, cefuroxime axetil, cephalexin and locarbef for uncomplicated UTI. Note that some isolates may be susceptible to these agents while testing resistant to cefazolin.   -  Cefazolin: S <=2 For uncomplicated UTI with K. pneumoniae, E. coli, or P. mirablis: AUTUMN <=16 is sensitive and AUTUMN >=32 is resistant. This also predicts results for oral agents cefaclor, cefdinir, cefpodoxime, cefprozil, cefuroxime axetil, cephalexin and locarbef for uncomplicated UTI. Note that some isolates may be susceptible to these agents while testing resistant to cefazolin.   -  Cefepime: S <=2   -  Cefepime: S <=2   -  Cefoxitin: S <=8   -  Cefoxitin: S <=8   -  Cefuroxime: S <=4   -  Cefuroxime: S <=4   -  Ciprofloxacin: S <=0.25   -  Ciprofloxacin: S <=0.25   -  Ceftriaxone: S <=1   -  Ceftriaxone: S <=1   -  Ertapenem: S <=0.5   -  Ertapenem: S <=0.5   -  Gentamicin: S <=2   -  Gentamicin: S <=2   Specimen Source: Clean Catch Clean Catch (Midstream)  Urinalysis + Microscopic Examination (12.18.24 @ 01:51)    pH Urine: 7.0   Urine Appearance: Cloudy   Color: Yellow   Specific Gravity: 1.009   Protein, Urine: 300 mg/dL   Glucose Qualitative, Urine: Negative mg/dL   Ketone - Urine: Negative mg/dL   Blood, Urine: Moderate   Bilirubin: Negative   Urobilinogen: 0.2 mg/dL   Leukocyte Esterase Concentration: Large   Nitrite: Negative   White Blood Cell - Urine: 281 /HPF   Red Blood Cell - Urine: 14 /HPF   Bacteria: Too Numerous to count /HPF   Cast: 0 /LPF   Epithelial Cells: 0 /HPF     Culture Results:   >100,000 CFU/ml Klebsiella pneumoniae  Multiple Morphological Strains   Organism Identification: Klebsiella pneumoniae  Klebsiella pneumoniae   Organism: Klebsiella pneumoniae   Organism: Klebsiella pneumoniae   Method Type: AUTUMN   Method Type: AUTUMN              RADIOLOGY & ADDITIONAL STUDIES:  < from: CT Abdomen and Pelvis No Cont (12.21.24 @ 13:09) >  IMPRESSION:  1. 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  2. There is a small high attenuation filling defect within the proximal   transplant ureter adjacent to the stent, which could represent debris or   a small clot. No convincing evidence of encrustation of ureteral stent.  3. The bladder is thick-walled with perivesicular fat stranding,   suggestive of cystitis. Correlation with urinalysis is suggested.    < end of copied text >   INFECTIOUS DISEASES FOLLOW UP-- [place]  [phone]    This is a follow up note for this  74yMale with  Acute renal failure  interval events:  armenta placed      ROS:  CONSTITUTIONAL:  No fever, good appetite  CARDIOVASCULAR:  No chest pain or palpitations  RESPIRATORY:  No dyspnea  GASTROINTESTINAL:  No nausea, vomiting, diarrhea, or abdominal pain  GENITOURINARY:  No dysuria  NEUROLOGIC:  No headache,     Allergies    No Known Allergies    Intolerances        ANTIBIOTICS/RELEVANT:  antimicrobials  ciprofloxacin     Tablet 500 milliGRAM(s) Oral every 24 hours  trimethoprim   80 mG/sulfamethoxazole 400 mG 1 Tablet(s) Oral daily    immunologic:  mycophenolate mofetil 500 milliGRAM(s) Oral two times a day  tacrolimus ER Tablet (ENVARSUS XR) 2 milliGRAM(s) Oral daily    OTHER:  acetaminophen     Tablet .. 650 milliGRAM(s) Oral every 6 hours PRN  allopurinol 100 milliGRAM(s) Oral daily  atorvastatin 10 milliGRAM(s) Oral at bedtime  citric acid/sodium citrate Solution 15 milliLiter(s) Oral daily  cyanocobalamin 1000 MICROGram(s) Oral daily  dextrose 5%. 1000 milliLiter(s) IV Continuous <Continuous>  dextrose 5%. 1000 milliLiter(s) IV Continuous <Continuous>  dextrose 50% Injectable 25 Gram(s) IV Push once  dextrose 50% Injectable 12.5 Gram(s) IV Push once  dextrose 50% Injectable 25 Gram(s) IV Push once  dextrose Oral Gel 15 Gram(s) Oral once PRN  folic acid 1 milliGRAM(s) Oral daily  glucagon  Injectable 1 milliGRAM(s) IntraMuscular once  heparin   Injectable 5000 Unit(s) SubCutaneous every 8 hours  insulin lispro (ADMELOG) corrective regimen sliding scale   SubCutaneous three times a day before meals  melatonin 3 milliGRAM(s) Oral at bedtime PRN  metoprolol succinate  milliGRAM(s) Oral daily  niacin SR 1000 milliGRAM(s) Oral at bedtime  NIFEdipine XL 60 milliGRAM(s) Oral daily  predniSONE   Tablet 5 milliGRAM(s) Oral daily  pyridoxine 100 milliGRAM(s) Oral daily      Objective:  Vital Signs Last 24 Hrs  T(C): 36.7 (22 Dec 2024 09:49), Max: 36.9 (22 Dec 2024 04:35)  T(F): 98.1 (22 Dec 2024 09:49), Max: 98.4 (22 Dec 2024 04:35)  HR: 78 (22 Dec 2024 09:49) (78 - 89)  BP: 126/57 (22 Dec 2024 09:49) (126/57 - 160/80)  BP(mean): --  RR: 18 (22 Dec 2024 09:49) (18 - 18)  SpO2: 97% (22 Dec 2024 09:49) (95% - 97%)    Parameters below as of 22 Dec 2024 09:49  Patient On (Oxygen Delivery Method): room air        PHYSICAL EXAM:  Constitutional:no acute distress  Eyes:KATIUSKA, EOMI  Ear/Nose/Throat: no oral lesions, 	  Respiratory: clear BL  Cardiovascular: S1S2  Gastrointestinal:soft, (+) BS, no tenderness  Extremities:no e/e/c LUE swollen compared to RUE  AVF on LUE  No Lymphadenopathy  IV sites not inflammed.    LABS:                        11.3   5.74  )-----------( 175      ( 22 Dec 2024 07:13 )             36.6     12-22    136  |  104  |  34[H]  ----------------------------<  114[H]  4.7   |  16[L]  |  4.17[H]    Ca    9.6      22 Dec 2024 07:12        Culture - Urine (12.18.24 @ 01:51)    -  Imipenem: S <=1   -  Imipenem: S <=1   -  Levofloxacin: S <=0.5   -  Levofloxacin: S <=0.5   -  Meropenem: S <=1   -  Meropenem: S <=1   -  Nitrofurantoin: I 64 Should not be used to treat pyelonephritis   -  Nitrofurantoin: S <=32 Should not be used to treat pyelonephritis   -  Piperacillin/Tazobactam: S <=8   -  Piperacillin/Tazobactam: S <=8   -  Tobramycin: S <=2   -  Tobramycin: S <=2   -  Trimethoprim/Sulfamethoxazole: R >2/38   -  Trimethoprim/Sulfamethoxazole: R >2/38   -  Amoxicillin/Clavulanic Acid: S <=8/4   -  Amoxicillin/Clavulanic Acid: S <=8/4   -  Ampicillin: R 16 These ampicillin results predict results for amoxicillin   -  Ampicillin: R 16 These ampicillin results predict results for amoxicillin   -  Ampicillin/Sulbactam: S <=4/2   -  Ampicillin/Sulbactam: S <=4/2   -  Aztreonam: S <=4   -  Aztreonam: S <=4   -  Cefazolin: S <=2 For uncomplicated UTI with K. pneumoniae, E. coli, or P. mirablis: AUTUMN <=16 is sensitive and AUTUMN >=32 is resistant. This also predicts results for oral agents cefaclor, cefdinir, cefpodoxime, cefprozil, cefuroxime axetil, cephalexin and locarbef for uncomplicated UTI. Note that some isolates may be susceptible to these agents while testing resistant to cefazolin.   -  Cefazolin: S <=2 For uncomplicated UTI with K. pneumoniae, E. coli, or P. mirablis: AUTUMN <=16 is sensitive and AUTUMN >=32 is resistant. This also predicts results for oral agents cefaclor, cefdinir, cefpodoxime, cefprozil, cefuroxime axetil, cephalexin and locarbef for uncomplicated UTI. Note that some isolates may be susceptible to these agents while testing resistant to cefazolin.   -  Cefepime: S <=2   -  Cefepime: S <=2   -  Cefoxitin: S <=8   -  Cefoxitin: S <=8   -  Cefuroxime: S <=4   -  Cefuroxime: S <=4   -  Ciprofloxacin: S <=0.25   -  Ciprofloxacin: S <=0.25   -  Ceftriaxone: S <=1   -  Ceftriaxone: S <=1   -  Ertapenem: S <=0.5   -  Ertapenem: S <=0.5   -  Gentamicin: S <=2   -  Gentamicin: S <=2   Specimen Source: Clean Catch Clean Catch (Midstream)  Urinalysis + Microscopic Examination (12.18.24 @ 01:51)    pH Urine: 7.0   Urine Appearance: Cloudy   Color: Yellow   Specific Gravity: 1.009   Protein, Urine: 300 mg/dL   Glucose Qualitative, Urine: Negative mg/dL   Ketone - Urine: Negative mg/dL   Blood, Urine: Moderate   Bilirubin: Negative   Urobilinogen: 0.2 mg/dL   Leukocyte Esterase Concentration: Large   Nitrite: Negative   White Blood Cell - Urine: 281 /HPF   Red Blood Cell - Urine: 14 /HPF   Bacteria: Too Numerous to count /HPF   Cast: 0 /LPF   Epithelial Cells: 0 /HPF     Culture Results:   >100,000 CFU/ml Klebsiella pneumoniae  Multiple Morphological Strains   Organism Identification: Klebsiella pneumoniae  Klebsiella pneumoniae   Organism: Klebsiella pneumoniae   Organism: Klebsiella pneumoniae   Method Type: AUTUMN   Method Type: AUTUMN              RADIOLOGY & ADDITIONAL STUDIES:  < from: CT Abdomen and Pelvis No Cont (12.21.24 @ 13:09) >  IMPRESSION:  1. 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  2. There is a small high attenuation filling defect within the proximal   transplant ureter adjacent to the stent, which could represent debris or   a small clot. No convincing evidence of encrustation of ureteral stent.  3. The bladder is thick-walled with perivesicular fat stranding,   suggestive of cystitis. Correlation with urinalysis is suggested.    < end of copied text >

## 2024-12-22 NOTE — PROGRESS NOTE ADULT - SUBJECTIVE AND OBJECTIVE BOX
INCOMPLETE NOTE    Ciara Denis M.D.  Division of Hospital Medicine  Available on [employer] TEAMS    SUBJECTIVE / ACUTE INTERVAL EVENTS: Patient seen and examined. No overnight events. No subjective complaints.     OBJECTIVE:   Vital Signs Last 24 Hrs  T(F): 98.4 (22 Dec 2024 04:35), Max: 98.4 (22 Dec 2024 04:35)  HR: 88 (22 Dec 2024 04:35) (85 - 89)  BP: 144/74 (22 Dec 2024 04:35) (144/74 - 160/80)  RR: 18 (22 Dec 2024 04:35) (18 - 18)  SpO2: 96% (22 Dec 2024 04:35) (95% - 96%)  Parameters below as of 22 Dec 2024 04:35  Patient On (Oxygen Delivery Method): room air    I&O's Summary  21 Dec 2024 07:01  -  22 Dec 2024 07:00  --------------------------------------------------------  IN: 720 mL / OUT: 700 mL / NET: 20 mL    Physical Examination:  GEN: elderly man, laying in bed in NAD  PSYCH: A&Ox3, mood and affect appear appropriate   NEURO: no focal neurologic deficits appreciated  RESPI: no accessory muscle use, B/L air entry  CARDIO: regular rate/rhythm, no LE edema B/L  ABD: soft, NT, ND, no CVAT B/L  EXT: patient able to move all extremities spontaneously  VASC: peripheral pulses palpated    Labs:  CAPILLARY BLOOD GLUCOSE  POCT Blood Glucose.: 141 mg/dL (22 Dec 2024 08:10)  POCT Blood Glucose.: 158 mg/dL (21 Dec 2024 21:39)  POCT Blood Glucose.: 156 mg/dL (21 Dec 2024 17:12)  POCT Blood Glucose.: 209 mg/dL (21 Dec 2024 12:27)                        11.3   5.74  )-----------( 175      ( 22 Dec 2024 07:13 )             36.6     12-22  136  |  104  |  34[H]  ----------------------------<  114[H]  4.7   |  16[L]  |  4.17[H]    Ca    9.6      22 Dec 2024 07:12    Urinalysis Basic - ( 22 Dec 2024 07:12 )  Color: x / Appearance: x / SG: x / pH: x  Gluc: 114 mg/dL / Ketone: x  / Bili: x / Urobili: x   Blood: x / Protein: x / Nitrite: x   Leuk Esterase: x / RBC: x / WBC x   Sq Epi: x / Non Sq Epi: x / Bacteria: x    Imaging Personally Reviewed:  - CT A/P w/o contrast as reported: 1. 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 2. There is a small high attenuation filling defect within the proximal transplant ureter adjacent to the stent, which could represent debris or a small clot. No convincing evidence of encrustation of ureteral stent. 3. The bladder is thick-walled with perivesicular fat stranding, suggestive of cystitis. Correlation with urinalysis is suggested.     Consultant(s) Notes Reviewed: Urology, Transplant ID  Care Discussed with Consultants/Other Providers: JENNIFER Ziegler    MEDICATIONS  (STANDING):  allopurinol 100 milliGRAM(s) Oral daily  atorvastatin 10 milliGRAM(s) Oral at bedtime  citric acid/sodium citrate Solution 15 milliLiter(s) Oral daily  cyanocobalamin 1000 MICROGram(s) Oral daily  dextrose 5%. 1000 milliLiter(s) (100 mL/Hr) IV Continuous <Continuous>  dextrose 5%. 1000 milliLiter(s) (50 mL/Hr) IV Continuous <Continuous>  dextrose 50% Injectable 25 Gram(s) IV Push once  dextrose 50% Injectable 12.5 Gram(s) IV Push once  dextrose 50% Injectable 25 Gram(s) IV Push once  folic acid 1 milliGRAM(s) Oral daily  glucagon  Injectable 1 milliGRAM(s) IntraMuscular once  heparin   Injectable 5000 Unit(s) SubCutaneous every 8 hours  insulin lispro (ADMELOG) corrective regimen sliding scale   SubCutaneous three times a day before meals  metoprolol succinate  milliGRAM(s) Oral daily  mycophenolate mofetil 500 milliGRAM(s) Oral two times a day  niacin SR 1000 milliGRAM(s) Oral at bedtime  NIFEdipine XL 60 milliGRAM(s) Oral daily  predniSONE   Tablet 5 milliGRAM(s) Oral daily  pyridoxine 100 milliGRAM(s) Oral daily  tacrolimus ER Tablet (ENVARSUS XR) 2 milliGRAM(s) Oral daily  trimethoprim   80 mG/sulfamethoxazole 400 mG 1 Tablet(s) Oral daily    MEDICATIONS  (PRN):  acetaminophen     Tablet .. 650 milliGRAM(s) Oral every 6 hours PRN Temp greater or equal to 38C (100.4F), Mild Pain (1 - 3)  dextrose Oral Gel 15 Gram(s) Oral once PRN Blood Glucose LESS THAN 70 milliGRAM(s)/deciliter  melatonin 3 milliGRAM(s) Oral at bedtime PRN Insomnia Ciara Denis M.D.  Division of Hospital Medicine  Available on [employer] TEAMS    SUBJECTIVE / ACUTE INTERVAL EVENTS: Patient seen and examined. No overnight events. No subjective complaints as patient states he has no complaints and wants to be able to get home before [place]. Per Urology yesterday, pt to have Velez placed with trending of Cr thereafter -- will place Velez today, and continue to determine plan for hopeful discharge in the next couple of days if medically optimized.      OBJECTIVE:   Vital Signs Last 24 Hrs  T(F): 98.4 (22 Dec 2024 04:35), Max: 98.4 (22 Dec 2024 04:35)  HR: 88 (22 Dec 2024 04:35) (85 - 89)  BP: 144/74 (22 Dec 2024 04:35) (144/74 - 160/80)  RR: 18 (22 Dec 2024 04:35) (18 - 18)  SpO2: 96% (22 Dec 2024 04:35) (95% - 96%)  Parameters below as of 22 Dec 2024 04:35  Patient On (Oxygen Delivery Method): room air    I&O's Summary  21 Dec 2024 07:01  -  22 Dec 2024 07:00  --------------------------------------------------------  IN: 720 mL / OUT: 700 mL / NET: 20 mL    Physical Examination:  GEN: elderly man, laying in bed in NAD  PSYCH: A&Ox3, mood and affect appear appropriate   NEURO: no focal neurologic deficits appreciated  RESPI: no accessory muscle use, B/L air entry  CARDIO: regular rate/rhythm, no LE edema B/L  ABD: soft, NT, ND, no CVAT B/L  EXT: patient able to move all extremities spontaneously, LUE >> RUE (stable)  VASC: peripheral pulses palpated    Labs:  CAPILLARY BLOOD GLUCOSE  POCT Blood Glucose.: 141 mg/dL (22 Dec 2024 08:10)  POCT Blood Glucose.: 158 mg/dL (21 Dec 2024 21:39)  POCT Blood Glucose.: 156 mg/dL (21 Dec 2024 17:12)  POCT Blood Glucose.: 209 mg/dL (21 Dec 2024 12:27)                        11.3   5.74  )-----------( 175      ( 22 Dec 2024 07:13 )             36.6     12-22  136  |  104  |  34[H]  ----------------------------<  114[H]  4.7   |  16[L]  |  4.17[H]    Ca    9.6      22 Dec 2024 07:12    Urinalysis Basic - ( 22 Dec 2024 07:12 )  Color: x / Appearance: x / SG: x / pH: x  Gluc: 114 mg/dL / Ketone: x  / Bili: x / Urobili: x   Blood: x / Protein: x / Nitrite: x   Leuk Esterase: x / RBC: x / WBC x   Sq Epi: x / Non Sq Epi: x / Bacteria: x    Imaging Personally Reviewed:  - CT A/P w/o contrast as reported: 1. 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 2. There is a small high attenuation filling defect within the proximal transplant ureter adjacent to the stent, which could represent debris or a small clot. No convincing evidence of encrustation of ureteral stent. 3. The bladder is thick-walled with perivesicular fat stranding, suggestive of cystitis. Correlation with urinalysis is suggested.     Consultant(s) Notes Reviewed: Urology, Transplant ID  Care Discussed with Consultants/Other Providers: ACP Mikhal Ibragimov    MEDICATIONS  (STANDING):  allopurinol 100 milliGRAM(s) Oral daily  atorvastatin 10 milliGRAM(s) Oral at bedtime  citric acid/sodium citrate Solution 15 milliLiter(s) Oral daily  cyanocobalamin 1000 MICROGram(s) Oral daily  dextrose 5%. 1000 milliLiter(s) (100 mL/Hr) IV Continuous <Continuous>  dextrose 5%. 1000 milliLiter(s) (50 mL/Hr) IV Continuous <Continuous>  dextrose 50% Injectable 25 Gram(s) IV Push once  dextrose 50% Injectable 12.5 Gram(s) IV Push once  dextrose 50% Injectable 25 Gram(s) IV Push once  folic acid 1 milliGRAM(s) Oral daily  glucagon  Injectable 1 milliGRAM(s) IntraMuscular once  heparin   Injectable 5000 Unit(s) SubCutaneous every 8 hours  insulin lispro (ADMELOG) corrective regimen sliding scale   SubCutaneous three times a day before meals  metoprolol succinate  milliGRAM(s) Oral daily  mycophenolate mofetil 500 milliGRAM(s) Oral two times a day  niacin SR 1000 milliGRAM(s) Oral at bedtime  NIFEdipine XL 60 milliGRAM(s) Oral daily  predniSONE   Tablet 5 milliGRAM(s) Oral daily  pyridoxine 100 milliGRAM(s) Oral daily  tacrolimus ER Tablet (ENVARSUS XR) 2 milliGRAM(s) Oral daily  trimethoprim   80 mG/sulfamethoxazole 400 mG 1 Tablet(s) Oral daily    MEDICATIONS  (PRN):  acetaminophen     Tablet .. 650 milliGRAM(s) Oral every 6 hours PRN Temp greater or equal to 38C (100.4F), Mild Pain (1 - 3)  dextrose Oral Gel 15 Gram(s) Oral once PRN Blood Glucose LESS THAN 70 milliGRAM(s)/deciliter  melatonin 3 milliGRAM(s) Oral at bedtime PRN Insomnia

## 2024-12-23 LAB
ANION GAP SERPL CALC-SCNC: 15 MMOL/L — SIGNIFICANT CHANGE UP (ref 5–17)
BUN SERPL-MCNC: 33 MG/DL — HIGH (ref 7–23)
CALCIUM SERPL-MCNC: 9.8 MG/DL — SIGNIFICANT CHANGE UP (ref 8.4–10.5)
CHLORIDE SERPL-SCNC: 104 MMOL/L — SIGNIFICANT CHANGE UP (ref 96–108)
CO2 SERPL-SCNC: 17 MMOL/L — LOW (ref 22–31)
CREAT SERPL-MCNC: 3.82 MG/DL — HIGH (ref 0.5–1.3)
EGFR: 16 ML/MIN/1.73M2 — LOW
GLUCOSE BLDC GLUCOMTR-MCNC: 159 MG/DL — HIGH (ref 70–99)
GLUCOSE BLDC GLUCOMTR-MCNC: 209 MG/DL — HIGH (ref 70–99)
GLUCOSE BLDC GLUCOMTR-MCNC: 220 MG/DL — HIGH (ref 70–99)
GLUCOSE BLDC GLUCOMTR-MCNC: 220 MG/DL — HIGH (ref 70–99)
GLUCOSE SERPL-MCNC: 186 MG/DL — HIGH (ref 70–99)
HCT VFR BLD CALC: 39.2 % — SIGNIFICANT CHANGE UP (ref 39–50)
HGB BLD-MCNC: 12.3 G/DL — LOW (ref 13–17)
MCHC RBC-ENTMCNC: 28.7 PG — SIGNIFICANT CHANGE UP (ref 27–34)
MCHC RBC-ENTMCNC: 31.4 G/DL — LOW (ref 32–36)
MCV RBC AUTO: 91.6 FL — SIGNIFICANT CHANGE UP (ref 80–100)
NRBC # BLD: 0 /100 WBCS — SIGNIFICANT CHANGE UP (ref 0–0)
PLATELET # BLD AUTO: 186 K/UL — SIGNIFICANT CHANGE UP (ref 150–400)
POTASSIUM SERPL-MCNC: 5.4 MMOL/L — HIGH (ref 3.5–5.3)
POTASSIUM SERPL-SCNC: 5.4 MMOL/L — HIGH (ref 3.5–5.3)
RBC # BLD: 4.28 M/UL — SIGNIFICANT CHANGE UP (ref 4.2–5.8)
RBC # FLD: 14.6 % — HIGH (ref 10.3–14.5)
SODIUM SERPL-SCNC: 136 MMOL/L — SIGNIFICANT CHANGE UP (ref 135–145)
WBC # BLD: 8.31 K/UL — SIGNIFICANT CHANGE UP (ref 3.8–10.5)
WBC # FLD AUTO: 8.31 K/UL — SIGNIFICANT CHANGE UP (ref 3.8–10.5)

## 2024-12-23 PROCEDURE — 99233 SBSQ HOSP IP/OBS HIGH 50: CPT

## 2024-12-23 PROCEDURE — 99232 SBSQ HOSP IP/OBS MODERATE 35: CPT

## 2024-12-23 PROCEDURE — 99232 SBSQ HOSP IP/OBS MODERATE 35: CPT | Mod: GC

## 2024-12-23 RX ORDER — SODIUM ZIRCONIUM CYCLOSILICATE 10 G/10G
5 POWDER, FOR SUSPENSION ORAL ONCE
Refills: 0 | Status: COMPLETED | OUTPATIENT
Start: 2024-12-23 | End: 2024-12-23

## 2024-12-23 RX ADMIN — Medication 1 TABLET(S): at 11:57

## 2024-12-23 RX ADMIN — ALLOPURINOL 100 MILLIGRAM(S): 100 TABLET ORAL at 11:57

## 2024-12-23 RX ADMIN — MYCOPHENOLATE MOFETIL 500 MILLIGRAM(S): 250 CAPSULE ORAL at 17:05

## 2024-12-23 RX ADMIN — HEPARIN SODIUM 5000 UNIT(S): 1000 INJECTION, SOLUTION INTRAVENOUS; SUBCUTANEOUS at 21:46

## 2024-12-23 RX ADMIN — Medication 1 MILLIGRAM(S): at 11:57

## 2024-12-23 RX ADMIN — Medication 5 MILLIGRAM(S): at 05:20

## 2024-12-23 RX ADMIN — Medication 1: at 09:27

## 2024-12-23 RX ADMIN — Medication 15 MILLILITER(S): at 21:46

## 2024-12-23 RX ADMIN — MYCOPHENOLATE MOFETIL 500 MILLIGRAM(S): 250 CAPSULE ORAL at 05:20

## 2024-12-23 RX ADMIN — Medication 100 MILLIGRAM(S): at 11:57

## 2024-12-23 RX ADMIN — TACROLIMUS 2 MILLIGRAM(S): 0.5 CAPSULE ORAL at 05:20

## 2024-12-23 RX ADMIN — ATORVASTATIN CALCIUM 10 MILLIGRAM(S): 40 TABLET, FILM COATED ORAL at 21:46

## 2024-12-23 RX ADMIN — CIPROFLOXACIN HYDROCHLORIDE 500 MILLIGRAM(S): 500 TABLET, FILM COATED ORAL at 17:05

## 2024-12-23 RX ADMIN — HEPARIN SODIUM 5000 UNIT(S): 1000 INJECTION, SOLUTION INTRAVENOUS; SUBCUTANEOUS at 05:21

## 2024-12-23 RX ADMIN — Medication 2: at 17:06

## 2024-12-23 RX ADMIN — Medication 2: at 12:30

## 2024-12-23 RX ADMIN — HEPARIN SODIUM 5000 UNIT(S): 1000 INJECTION, SOLUTION INTRAVENOUS; SUBCUTANEOUS at 12:31

## 2024-12-23 RX ADMIN — NIFEDIPINE 60 MILLIGRAM(S): 60 TABLET, EXTENDED RELEASE ORAL at 05:20

## 2024-12-23 RX ADMIN — CYANOCOBALAMIN 1000 MICROGRAM(S): 1000 INJECTION, SOLUTION INTRAMUSCULAR; SUBCUTANEOUS at 11:57

## 2024-12-23 RX ADMIN — SODIUM ZIRCONIUM CYCLOSILICATE 5 GRAM(S): 10 POWDER, FOR SUSPENSION ORAL at 11:58

## 2024-12-23 RX ADMIN — Medication 100 MILLIGRAM(S): at 05:20

## 2024-12-23 RX ADMIN — Medication 1000 MILLIGRAM(S): at 21:45

## 2024-12-23 NOTE — PROGRESS NOTE ADULT - SUBJECTIVE AND OBJECTIVE BOX
[facility] DIVISION OF KIDNEY DISEASES AND HYPERTENSION -- FOLLOW UP NOTE  --------------------------------------------------------------------------------  Chief Complaint: s/p Hep C+ DDRT in 7/2018     24 hour events/subjective: Pt. seen and examined at bedside earlier today. Feels swelling in L AVF is slowly improving. Pt. had armenta catheter placed last night. Denies pain at transplant site. No fever, CP, SOB, LE edema, HA or dizziness during rounds today.         PAST HISTORY  --------------------------------------------------------------------------------  No significant changes to PMH, PSH, FHx, SHx, unless otherwise noted    ALLERGIES & MEDICATIONS  --------------------------------------------------------------------------------  Allergies    No Known Allergies    Intolerances      Standing Inpatient Medications  allopurinol 100 milliGRAM(s) Oral daily  atorvastatin 10 milliGRAM(s) Oral at bedtime  ciprofloxacin     Tablet 500 milliGRAM(s) Oral every 24 hours  citric acid/sodium citrate Solution 15 milliLiter(s) Oral daily  cyanocobalamin 1000 MICROGram(s) Oral daily  dextrose 5%. 1000 milliLiter(s) IV Continuous <Continuous>  dextrose 5%. 1000 milliLiter(s) IV Continuous <Continuous>  dextrose 50% Injectable 25 Gram(s) IV Push once  dextrose 50% Injectable 12.5 Gram(s) IV Push once  dextrose 50% Injectable 25 Gram(s) IV Push once  folic acid 1 milliGRAM(s) Oral daily  glucagon  Injectable 1 milliGRAM(s) IntraMuscular once  heparin   Injectable 5000 Unit(s) SubCutaneous every 8 hours  insulin lispro (ADMELOG) corrective regimen sliding scale   SubCutaneous three times a day before meals  metoprolol succinate  milliGRAM(s) Oral daily  mycophenolate mofetil 500 milliGRAM(s) Oral two times a day  niacin SR 1000 milliGRAM(s) Oral at bedtime  NIFEdipine XL 60 milliGRAM(s) Oral daily  predniSONE   Tablet 5 milliGRAM(s) Oral daily  pyridoxine 100 milliGRAM(s) Oral daily  tacrolimus ER Tablet (ENVARSUS XR) 2 milliGRAM(s) Oral daily  trimethoprim   80 mG/sulfamethoxazole 400 mG 1 Tablet(s) Oral daily    PRN Inpatient Medications  acetaminophen     Tablet .. 650 milliGRAM(s) Oral every 6 hours PRN  dextrose Oral Gel 15 Gram(s) Oral once PRN  melatonin 3 milliGRAM(s) Oral at bedtime PRN      REVIEW OF SYSTEMS  --------------------------------------------------------------------------------    All other systems were reviewed and are negative, except as noted.    VITALS/PHYSICAL EXAM  --------------------------------------------------------------------------------  T(C): 36.7 (12-23-24 @ 09:00), Max: 36.9 (12-22-24 @ 16:41)  HR: 82 (12-23-24 @ 09:00) (67 - 82)  BP: 155/73 (12-23-24 @ 09:00) (142/72 - 155/73)  RR: 18 (12-23-24 @ 09:00) (18 - 18)  SpO2: 99% (12-23-24 @ 09:00) (98% - 100%)  Wt(kg): --        12-22-24 @ 07:01  -  12-23-24 @ 07:00  --------------------------------------------------------  IN: 540 mL / OUT: 1600 mL / NET: -1060 mL      Physical Exam:  Gen: NAD, able to speak in full sentences   HEENT: PERRL, MMM   Pulm: CTA B/L, no crackles   CV: RRR, S1S2+  Abd: +BS, soft  Transplant: No tenderness, swelling  : No suprapubic tenderness +armenta catheter  MSK: +significant LUE swelling, improving.   Psych: Normal affect and mood      LABS/STUDIES  --------------------------------------------------------------------------------              12.3   8.31  >-----------<  186      [12-23-24 @ 09:17]              39.2     136  |  104  |  33  ----------------------------<  186      [12-23-24 @ 09:17]  5.4   |  17  |  3.82        Ca     9.8     [12-23-24 @ 09:17]            Creatinine Trend:  SCr 3.82 [12-23 @ 09:17]  SCr 4.17 [12-22 @ 07:12]  SCr 4.23 [12-21 @ 07:28]  SCr 3.69 [12-19 @ 07:12]  SCr 3.14 [12-18 @ 05:54]    Tacrolimus (), Serum: 4.7 ng/mL (12-22 @ 07:13)  Tacrolimus (), Serum: 8.7 ng/mL (12-19 @ 07:13)  Tacrolimus (), Serum: 8.6 ng/mL (12-18 @ 05:54)

## 2024-12-23 NOTE — PROGRESS NOTE ADULT - SUBJECTIVE AND OBJECTIVE BOX
Follow Up:      Interval History:    REVIEW OF SYSTEMS  [  ] ROS unobtainable because:    [  ] All other systems negative except as noted below    Constitutional:  [ ] fever [ ] chills  [ ] weight loss  [ ] weakness  Skin:  [ ] rash [ ] phlebitis	  Eyes: [ ] icterus [ ] pain  [ ] discharge	  ENMT: [ ] sore throat  [ ] thrush [ ] ulcers [ ] exudates  Respiratory: [ ] dyspnea [ ] hemoptysis [ ] cough [ ] sputum	  Cardiovascular:  [ ] chest pain [ ] palpitations [ ] edema	  Gastrointestinal:  [ ] nausea [ ] vomiting [ ] diarrhea [ ] constipation [ ] pain	  Genitourinary:  [ ] dysuria [ ] frequency [ ] hematuria [ ] discharge [ ] flank pain  [ ] incontinence  Musculoskeletal:  [ ] myalgias [ ] arthralgias [ ] arthritis  [ ] back pain  Neurological:  [ ] headache [ ] seizures  [ ] confusion/altered mental status    Allergies  No Known Allergies        ANTIMICROBIALS:  ciprofloxacin     Tablet 500 every 24 hours  trimethoprim   80 mG/sulfamethoxazole 400 mG 1 daily      OTHER MEDS:  MEDICATIONS  (STANDING):  acetaminophen     Tablet .. 650 every 6 hours PRN  allopurinol 100 daily  atorvastatin 10 at bedtime  dextrose 50% Injectable 25 once  dextrose 50% Injectable 12.5 once  dextrose 50% Injectable 25 once  dextrose Oral Gel 15 once PRN  glucagon  Injectable 1 once  heparin   Injectable 5000 every 8 hours  insulin lispro (ADMELOG) corrective regimen sliding scale  three times a day before meals  melatonin 3 at bedtime PRN  metoprolol succinate  daily  mycophenolate mofetil 500 two times a day  niacin SR 1000 at bedtime  NIFEdipine XL 60 daily  predniSONE   Tablet 5 daily  tacrolimus ER Tablet (ENVARSUS XR) 2 daily      Vital Signs Last 24 Hrs  T(C): 36.7 (23 Dec 2024 13:52), Max: 36.9 (22 Dec 2024 19:56)  T(F): 98.1 (23 Dec 2024 13:52), Max: 98.5 (22 Dec 2024 19:56)  HR: 78 (23 Dec 2024 13:52) (74 - 82)  BP: 137/67 (23 Dec 2024 13:52) (137/67 - 155/73)  BP(mean): --  RR: 18 (23 Dec 2024 13:52) (18 - 18)  SpO2: 99% (23 Dec 2024 13:52) (99% - 100%)    Parameters below as of 23 Dec 2024 09:00  Patient On (Oxygen Delivery Method): room air        PHYSICAL EXAMINATION:  General: Alert and Awake, NAD  HEENT: PERRL, EOMI  Neck: Supple  Cardiac: RRR, No M/R/G  Resp: CTAB, No Wh/Rh/Ra  Abdomen: NBS, NT/ND, No HSM, No rigidity or guarding  MSK: No LE edema. No Calf tenderness  : No armenta  Skin: No rashes or lesions. Skin is warm and dry to the touch.   Neuro: Alert and Awake. CN 2-12 Grossly intact. Moves all four extremities spontaneously.  Psych: Calm, Pleasant, Cooperative                          12.3   8.31  )-----------( 186      ( 23 Dec 2024 09:17 )             39.2       12-23    136  |  104  |  33[H]  ----------------------------<  186[H]  5.4[H]   |  17[L]  |  3.82[H]    Ca    9.8      23 Dec 2024 09:17        Urinalysis Basic - ( 23 Dec 2024 09:17 )    Color: x / Appearance: x / SG: x / pH: x  Gluc: 186 mg/dL / Ketone: x  / Bili: x / Urobili: x   Blood: x / Protein: x / Nitrite: x   Leuk Esterase: x / RBC: x / WBC x   Sq Epi: x / Non Sq Epi: x / Bacteria: x        MICROBIOLOGY:  v  Clean Catch Clean Catch (Midstream)  12-18-24   >100,000 CFU/ml Klebsiella pneumoniae  Multiple Morphological Strains  --  Klebsiella pneumoniae  Klebsiella pneumoniae                RADIOLOGY:    <The imaging below has been reviewed and visualized by me independently. Findings as detailed in report below> Follow Up:  UTI    Interval History: afebrile. no acute events.     REVIEW OF SYSTEMS  [  ] ROS unobtainable because:    [  ] All other systems negative except as noted below    Constitutional:  [ ] fever [ ] chills  [ ] weight loss  [ ] weakness  Skin:  [ ] rash [ ] phlebitis	  Eyes: [ ] icterus [ ] pain  [ ] discharge	  ENMT: [ ] sore throat  [ ] thrush [ ] ulcers [ ] exudates  Respiratory: [ ] dyspnea [ ] hemoptysis [ ] cough [ ] sputum	  Cardiovascular:  [ ] chest pain [ ] palpitations [ ] edema	  Gastrointestinal:  [ ] nausea [ ] vomiting [ ] diarrhea [ ] constipation [ ] pain	  Genitourinary:  [ ] dysuria [ ] frequency [ ] hematuria [ ] discharge [ ] flank pain  [ ] incontinence  Musculoskeletal:  [ ] myalgias [ ] arthralgias [ ] arthritis  [ ] back pain  Neurological:  [ ] headache [ ] seizures  [ ] confusion/altered mental status    Allergies  No Known Allergies        ANTIMICROBIALS:  ciprofloxacin     Tablet 500 every 24 hours  trimethoprim   80 mG/sulfamethoxazole 400 mG 1 daily      OTHER MEDS:  MEDICATIONS  (STANDING):  acetaminophen     Tablet .. 650 every 6 hours PRN  allopurinol 100 daily  atorvastatin 10 at bedtime  dextrose 50% Injectable 25 once  dextrose 50% Injectable 12.5 once  dextrose 50% Injectable 25 once  dextrose Oral Gel 15 once PRN  glucagon  Injectable 1 once  heparin   Injectable 5000 every 8 hours  insulin lispro (ADMELOG) corrective regimen sliding scale  three times a day before meals  melatonin 3 at bedtime PRN  metoprolol succinate  daily  mycophenolate mofetil 500 two times a day  niacin SR 1000 at bedtime  NIFEdipine XL 60 daily  predniSONE   Tablet 5 daily  tacrolimus ER Tablet (ENVARSUS XR) 2 daily      Vital Signs Last 24 Hrs  T(C): 36.7 (23 Dec 2024 13:52), Max: 36.9 (22 Dec 2024 19:56)  T(F): 98.1 (23 Dec 2024 13:52), Max: 98.5 (22 Dec 2024 19:56)  HR: 78 (23 Dec 2024 13:52) (74 - 82)  BP: 137/67 (23 Dec 2024 13:52) (137/67 - 155/73)  BP(mean): --  RR: 18 (23 Dec 2024 13:52) (18 - 18)  SpO2: 99% (23 Dec 2024 13:52) (99% - 100%)    Parameters below as of 23 Dec 2024 09:00  Patient On (Oxygen Delivery Method): room air    PHYSICAL EXAMINATION:  General: Alert and Awake, NAD  HEENT: Normocephalic / Atraumatic  Resp: No accessory muscles of respiration utilized  Abdomen: Not distended.  MSK: No LE edema.   : No armenta  Skin: No rashes or lesions.    Neuro: Alert and Awake. CN 2-12 Grossly intact. Moves all four extremities spontaneously.  Psych: Calm, Pleasant, Cooperative                          12.3   8.31  )-----------( 186      ( 23 Dec 2024 09:17 )             39.2       12-23    136  |  104  |  33[H]  ----------------------------<  186[H]  5.4[H]   |  17[L]  |  3.82[H]    Ca    9.8      23 Dec 2024 09:17        Urinalysis Basic - ( 23 Dec 2024 09:17 )    Color: x / Appearance: x / SG: x / pH: x  Gluc: 186 mg/dL / Ketone: x  / Bili: x / Urobili: x   Blood: x / Protein: x / Nitrite: x   Leuk Esterase: x / RBC: x / WBC x   Sq Epi: x / Non Sq Epi: x / Bacteria: x        MICROBIOLOGY:  v  Clean Catch Clean Catch (Midstream)  12-18-24   >100,000 CFU/ml Klebsiella pneumoniae  Multiple Morphological Strains  --  Klebsiella pneumoniae  Klebsiella pneumoniae    RADIOLOGY:    <The imaging below has been reviewed and visualized by me independently. Findings as detailed in report below>    < from: CT Abdomen and Pelvis No Cont (12.21.24 @ 13:09) >  MPRESSION:  1. 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  2. There is a small high attenuation filling defect within the proximal   transplant ureter adjacent to the stent, which could represent debris or   a small clot. No convincing evidence of encrustation of ureteral stent.  3. The bladder is thick-walled with perivesicular fat stranding,   suggestive of cystitis. Correlation with urinalysis is suggested.    < end of copied text >

## 2024-12-23 NOTE — PROGRESS NOTE ADULT - SUBJECTIVE AND OBJECTIVE BOX
Subjective  Patient is doing well this morning with no complaints. Patient s/p catheter placement draining CYU. Denies fevers, chills, nausea, vomiting, SOB, CP. Tolerating diet.    Objective    Vital signs  T(F): , Max: 98.5 (12-22-24 @ 16:41)  HR: 82 (12-23-24 @ 09:00)  BP: 155/73 (12-23-24 @ 09:00)  SpO2: 99% (12-23-24 @ 09:00)  Wt(kg): --    Output     OUT:    Indwelling Catheter - Urethral (mL): 1300 mL    Voided (mL): 300 mL  Total OUT: 1600 mL    Total NET: -1600 mL          Gen: NAD  Abd: soft, nontender, nondistended  : armenta secured in place, draining CYU    Labs      12-23 @ 09:17    WBC 8.31  / Hct 39.2  / SCr 3.82     12-22 @ 07:13    WBC 5.74  / Hct 36.6  / SCr --           Culture - Urine (collected 12-18-24 @ 01:51)  Source: Clean Catch Clean Catch (Midstream)  Final Report (12-20-24 @ 17:18):    >100,000 CFU/ml Klebsiella pneumoniae    Multiple Morphological Strains  Organism: Klebsiella pneumoniae  Klebsiella pneumoniae (12-20-24 @ 17:18)  Organism: Klebsiella pneumoniae (12-20-24 @ 17:18)      Method Type: AUTUMN      -  Amoxicillin/Clavulanic Acid: S <=8/4      -  Ampicillin: R 16 These ampicillin results predict results for amoxicillin      -  Ampicillin/Sulbactam: S <=4/2      -  Aztreonam: S <=4      -  Cefazolin: S <=2 For uncomplicated UTI with K. pneumoniae, E. coli, or P. mirablis: AUTUMN <=16 is sensitive and AUTUMN >=32 is resistant. This also predicts results for oral agents cefaclor, cefdinir, cefpodoxime, cefprozil, cefuroxime axetil, cephalexin and locarbef for uncomplicated UTI. Note that some isolates may be susceptible to these agents while testing resistant to cefazolin.      -  Cefepime: S <=2      -  Cefoxitin: S <=8      -  Ceftriaxone: S <=1      -  Cefuroxime: S <=4      -  Ciprofloxacin: S <=0.25      -  Ertapenem: S <=0.5      -  Gentamicin: S <=2      -  Imipenem: S <=1      -  Levofloxacin: S <=0.5      -  Meropenem: S <=1      -  Nitrofurantoin: I 64 Should not be used to treat pyelonephritis      -  Piperacillin/Tazobactam: S <=8      -  Tobramycin: S <=2      -  Trimethoprim/Sulfamethoxazole: R >2/38  Organism: Klebsiella pneumoniae (12-20-24 @ 17:18)      Method Type: AUTUMN      -  Amoxicillin/Clavulanic Acid: S <=8/4      -  Ampicillin: R 16 These ampicillin results predict results for amoxicillin      -  Ampicillin/Sulbactam: S <=4/2      -  Aztreonam: S <=4      -  Cefazolin: S <=2 For uncomplicated UTI with K. pneumoniae, E. coli, or P. mirablis: AUTUMN <=16 is sensitive and AUTUMN >=32 is resistant. This also predicts results for oral agents cefaclor, cefdinir, cefpodoxime, cefprozil, cefuroxime axetil, cephalexin and locarbef for uncomplicated UTI. Note that some isolates may be susceptible to these agents while testing resistant to cefazolin.      -  Cefepime: S <=2      -  Cefoxitin: S <=8      -  Ceftriaxone: S <=1      -  Cefuroxime: S <=4      -  Ciprofloxacin: S <=0.25      -  Ertapenem: S <=0.5      -  Gentamicin: S <=2      -  Imipenem: S <=1      -  Levofloxacin: S <=0.5      -  Meropenem: S <=1      -  Nitrofurantoin: S <=32 Should not be used to treat pyelonephritis      -  Piperacillin/Tazobactam: S <=8      -  Tobramycin: S <=2      -  Trimethoprim/Sulfamethoxazole: R >2/38        Urine Cx: ?  Blood Cx: ?    Imaging

## 2024-12-23 NOTE — PROGRESS NOTE ADULT - SUBJECTIVE AND OBJECTIVE BOX
Patient is a 74y old  Male who presents with a chief complaint of LUE swelling (23 Dec 2024 12:33)      SUBJECTIVE / OVERNIGHT EVENTS: pt feels ok, denies cp, sob, abd pain, chills     MEDICATIONS  (STANDING):  allopurinol 100 milliGRAM(s) Oral daily  atorvastatin 10 milliGRAM(s) Oral at bedtime  ciprofloxacin     Tablet 500 milliGRAM(s) Oral every 24 hours  citric acid/sodium citrate Solution 15 milliLiter(s) Oral daily  cyanocobalamin 1000 MICROGram(s) Oral daily  dextrose 5%. 1000 milliLiter(s) (50 mL/Hr) IV Continuous <Continuous>  dextrose 5%. 1000 milliLiter(s) (100 mL/Hr) IV Continuous <Continuous>  dextrose 50% Injectable 25 Gram(s) IV Push once  dextrose 50% Injectable 12.5 Gram(s) IV Push once  dextrose 50% Injectable 25 Gram(s) IV Push once  folic acid 1 milliGRAM(s) Oral daily  glucagon  Injectable 1 milliGRAM(s) IntraMuscular once  heparin   Injectable 5000 Unit(s) SubCutaneous every 8 hours  insulin lispro (ADMELOG) corrective regimen sliding scale   SubCutaneous three times a day before meals  metoprolol succinate  milliGRAM(s) Oral daily  mycophenolate mofetil 500 milliGRAM(s) Oral two times a day  niacin SR 1000 milliGRAM(s) Oral at bedtime  NIFEdipine XL 60 milliGRAM(s) Oral daily  predniSONE   Tablet 5 milliGRAM(s) Oral daily  pyridoxine 100 milliGRAM(s) Oral daily  tacrolimus ER Tablet (ENVARSUS XR) 2 milliGRAM(s) Oral daily  trimethoprim   80 mG/sulfamethoxazole 400 mG 1 Tablet(s) Oral daily    MEDICATIONS  (PRN):  acetaminophen     Tablet .. 650 milliGRAM(s) Oral every 6 hours PRN Temp greater or equal to 38C (100.4F), Mild Pain (1 - 3)  dextrose Oral Gel 15 Gram(s) Oral once PRN Blood Glucose LESS THAN 70 milliGRAM(s)/deciliter  melatonin 3 milliGRAM(s) Oral at bedtime PRN Insomnia        CAPILLARY BLOOD GLUCOSE      POCT Blood Glucose.: 209 mg/dL (23 Dec 2024 12:25)  POCT Blood Glucose.: 159 mg/dL (23 Dec 2024 08:11)  POCT Blood Glucose.: 154 mg/dL (22 Dec 2024 21:42)  POCT Blood Glucose.: 212 mg/dL (22 Dec 2024 17:05)    I&O's Summary    22 Dec 2024 07:01  -  23 Dec 2024 07:00  --------------------------------------------------------  IN: 540 mL / OUT: 1600 mL / NET: -1060 mL        PHYSICAL EXAM:  GENERAL: NAD, well-developed  HEAD:  Atraumatic, Normocephalic  EYES: EOMI, PERRLA, conjunctiva and sclera clear  NECK: Supple, No JVD  CHEST/LUNG: Clear to auscultation bilaterally; No wheeze  HEART: Regular rate and rhythm; No murmurs, rubs, or gallops  ABDOMEN: Soft, Nontender, Nondistended; Bowel sounds present  EXTREMITIES:  2+ Peripheral Pulses, No clubbing, cyanosis, or edema  PSYCH: AAOx3      LABS:                        12.3   8.31  )-----------( 186      ( 23 Dec 2024 09:17 )             39.2     12-23    136  |  104  |  33[H]  ----------------------------<  186[H]  5.4[H]   |  17[L]  |  3.82[H]    Ca    9.8      23 Dec 2024 09:17            Urinalysis Basic - ( 23 Dec 2024 09:17 )    Color: x / Appearance: x / SG: x / pH: x  Gluc: 186 mg/dL / Ketone: x  / Bili: x / Urobili: x   Blood: x / Protein: x / Nitrite: x   Leuk Esterase: x / RBC: x / WBC x   Sq Epi: x / Non Sq Epi: x / Bacteria: x        RADIOLOGY & ADDITIONAL TESTS:    Imaging Personally Reviewed:    Consultant(s) Notes Reviewed:      Care Discussed with Consultants/Other Providers:

## 2024-12-24 ENCOUNTER — TRANSCRIPTION ENCOUNTER (OUTPATIENT)
Age: 74
End: 2024-12-24

## 2024-12-24 VITALS
OXYGEN SATURATION: 98 % | HEART RATE: 79 BPM | DIASTOLIC BLOOD PRESSURE: 67 MMHG | RESPIRATION RATE: 18 BRPM | SYSTOLIC BLOOD PRESSURE: 144 MMHG | TEMPERATURE: 98 F

## 2024-12-24 LAB
ANION GAP SERPL CALC-SCNC: 20 MMOL/L — HIGH (ref 5–17)
BUN SERPL-MCNC: 35 MG/DL — HIGH (ref 7–23)
CALCIUM SERPL-MCNC: 9.8 MG/DL — SIGNIFICANT CHANGE UP (ref 8.4–10.5)
CHLORIDE SERPL-SCNC: 106 MMOL/L — SIGNIFICANT CHANGE UP (ref 96–108)
CO2 SERPL-SCNC: 17 MMOL/L — LOW (ref 22–31)
CREAT SERPL-MCNC: 4.16 MG/DL — HIGH (ref 0.5–1.3)
EGFR: 14 ML/MIN/1.73M2 — LOW
GLUCOSE BLDC GLUCOMTR-MCNC: 192 MG/DL — HIGH (ref 70–99)
GLUCOSE BLDC GLUCOMTR-MCNC: 260 MG/DL — HIGH (ref 70–99)
GLUCOSE SERPL-MCNC: 139 MG/DL — HIGH (ref 70–99)
HCT VFR BLD CALC: 35.4 % — LOW (ref 39–50)
HGB BLD-MCNC: 11.1 G/DL — LOW (ref 13–17)
MCHC RBC-ENTMCNC: 28 PG — SIGNIFICANT CHANGE UP (ref 27–34)
MCHC RBC-ENTMCNC: 31.4 G/DL — LOW (ref 32–36)
MCV RBC AUTO: 89.4 FL — SIGNIFICANT CHANGE UP (ref 80–100)
NRBC # BLD: 0 /100 WBCS — SIGNIFICANT CHANGE UP (ref 0–0)
PLATELET # BLD AUTO: 185 K/UL — SIGNIFICANT CHANGE UP (ref 150–400)
POTASSIUM SERPL-MCNC: 4.7 MMOL/L — SIGNIFICANT CHANGE UP (ref 3.5–5.3)
POTASSIUM SERPL-SCNC: 4.7 MMOL/L — SIGNIFICANT CHANGE UP (ref 3.5–5.3)
RBC # BLD: 3.96 M/UL — LOW (ref 4.2–5.8)
RBC # FLD: 14.6 % — HIGH (ref 10.3–14.5)
SODIUM SERPL-SCNC: 143 MMOL/L — SIGNIFICANT CHANGE UP (ref 135–145)
WBC # BLD: 7.4 K/UL — SIGNIFICANT CHANGE UP (ref 3.8–10.5)
WBC # FLD AUTO: 7.4 K/UL — SIGNIFICANT CHANGE UP (ref 3.8–10.5)

## 2024-12-24 PROCEDURE — 93990 DOPPLER FLOW TESTING: CPT

## 2024-12-24 PROCEDURE — 80197 ASSAY OF TACROLIMUS: CPT

## 2024-12-24 PROCEDURE — 87186 SC STD MICRODIL/AGAR DIL: CPT

## 2024-12-24 PROCEDURE — 76776 US EXAM K TRANSPL W/DOPPLER: CPT

## 2024-12-24 PROCEDURE — 87040 BLOOD CULTURE FOR BACTERIA: CPT

## 2024-12-24 PROCEDURE — 74176 CT ABD & PELVIS W/O CONTRAST: CPT | Mod: MC

## 2024-12-24 PROCEDURE — 99285 EMERGENCY DEPT VISIT HI MDM: CPT

## 2024-12-24 PROCEDURE — 85027 COMPLETE CBC AUTOMATED: CPT

## 2024-12-24 PROCEDURE — 76770 US EXAM ABDO BACK WALL COMP: CPT | Mod: 26

## 2024-12-24 PROCEDURE — 87077 CULTURE AEROBIC IDENTIFY: CPT

## 2024-12-24 PROCEDURE — 85730 THROMBOPLASTIN TIME PARTIAL: CPT

## 2024-12-24 PROCEDURE — 93971 EXTREMITY STUDY: CPT

## 2024-12-24 PROCEDURE — 80053 COMPREHEN METABOLIC PANEL: CPT

## 2024-12-24 PROCEDURE — 82962 GLUCOSE BLOOD TEST: CPT

## 2024-12-24 PROCEDURE — 87086 URINE CULTURE/COLONY COUNT: CPT

## 2024-12-24 PROCEDURE — 81001 URINALYSIS AUTO W/SCOPE: CPT

## 2024-12-24 PROCEDURE — 97161 PT EVAL LOW COMPLEX 20 MIN: CPT

## 2024-12-24 PROCEDURE — 99239 HOSP IP/OBS DSCHRG MGMT >30: CPT

## 2024-12-24 PROCEDURE — 76770 US EXAM ABDO BACK WALL COMP: CPT

## 2024-12-24 PROCEDURE — 36415 COLL VENOUS BLD VENIPUNCTURE: CPT

## 2024-12-24 PROCEDURE — 80048 BASIC METABOLIC PNL TOTAL CA: CPT

## 2024-12-24 PROCEDURE — 85610 PROTHROMBIN TIME: CPT

## 2024-12-24 PROCEDURE — 85025 COMPLETE CBC W/AUTO DIFF WBC: CPT

## 2024-12-24 RX ORDER — CIPROFLOXACIN HYDROCHLORIDE 500 MG/1
1 TABLET, FILM COATED ORAL
Qty: 6 | Refills: 0
Start: 2024-12-24 | End: 2024-12-29

## 2024-12-24 RX ADMIN — Medication 100 MILLIGRAM(S): at 05:03

## 2024-12-24 RX ADMIN — Medication 5 MILLIGRAM(S): at 05:03

## 2024-12-24 RX ADMIN — Medication 1 TABLET(S): at 12:21

## 2024-12-24 RX ADMIN — Medication 3: at 12:22

## 2024-12-24 RX ADMIN — TACROLIMUS 2 MILLIGRAM(S): 0.5 CAPSULE ORAL at 05:03

## 2024-12-24 RX ADMIN — Medication 100 MILLIGRAM(S): at 12:21

## 2024-12-24 RX ADMIN — CYANOCOBALAMIN 1000 MICROGRAM(S): 1000 INJECTION, SOLUTION INTRAMUSCULAR; SUBCUTANEOUS at 12:21

## 2024-12-24 RX ADMIN — ALLOPURINOL 100 MILLIGRAM(S): 100 TABLET ORAL at 12:21

## 2024-12-24 RX ADMIN — Medication 1: at 09:04

## 2024-12-24 RX ADMIN — Medication 15 MILLILITER(S): at 13:08

## 2024-12-24 RX ADMIN — NIFEDIPINE 60 MILLIGRAM(S): 60 TABLET, EXTENDED RELEASE ORAL at 05:03

## 2024-12-24 RX ADMIN — HEPARIN SODIUM 5000 UNIT(S): 1000 INJECTION, SOLUTION INTRAVENOUS; SUBCUTANEOUS at 05:04

## 2024-12-24 RX ADMIN — MYCOPHENOLATE MOFETIL 500 MILLIGRAM(S): 250 CAPSULE ORAL at 05:03

## 2024-12-24 NOTE — PROGRESS NOTE ADULT - ASSESSMENT
71-year-old male with PMHx of ESRD due to bilateral nephrectomy for RCC in 2015, s/p Hep C+ DDRT in 7/2018 with Simulect induction complicated by DGF, mild rejection treated with steroids, distal ureteral stricture requiring multiple procedures PCN, NU, ureteral dilations, currently gets stent exchange by Dr. Phillip (with baseline creatinine ~ 2.7-2.9),HTN, DM type II, Prostate cancer s/p Orgovyx and radiation therapy completed 8/2021 PSA down to 0.03 in Jan 2022., BPH s/p TURP in 11/2021,  SBO s/p ileostomy with reversal in 2017. Ho CMV viremia, recurrent UTIs, constipation, anal fissure presenting with LUE swelling and pain. Transplant nephrology consulted for CHELA on transplant kidney and IS.     1. s/p Hep C+ DDRT in 7/2018   - complicated by DGF, mild rejection treated with steroids, distal ureteral stricture requiring multiple procedures PCN, ureteral dilations, currently gets stent exchanges with Dr. Phillip  - SCr prior to admission was 2.82 (11/4/24). SCr on admission elevated to 4.23 (12/21) but improving to 3.84 today (12/23/24)  - Transplant US 12/18/24: No evidence of a significant renal artery stenosis. Moderate hydroureteronephrosis of the RLQ renal transplant.  - Urology note from 12/21/24 noted, pt. s/p armenta catheter placement 12/22/24. Recommend repeat transplant US for hydro eval.   - Monitor labs and I/Os. Avoid nephrotoxins including, ACE/ARB, NSAIDs, contrast, etc. Dose medications as per eGFR.     2. Immunosuppression   - Continue MMF 250mg BID, prednisone 5mg, envarsus 2mg  - Goal Envarsus level 5-7. Check tacro trough 30 mins prior to AM dose.     3. LUE Swelling   - Vascular consulted   - LUE duplex negative for DVT.   - Outpatient follow up for fistulogram.     4. Oxaluria - continue sodium citrate 50 ml BID.   
73 yo M pmhx HTN, T2DM,Hep C, ESRD (radiocephalic fistula at [facility]) and kidney and prostate cancer s/p chemo, kidney transplant recipient  in 2018 (now off HD) and Left TKR presents for LUE swelling that began 4 days prior. Vascular surgery consulted for swelling.     Plan:   - No acute vascular surgical intervention  - LUE duplex reviewed. Patient should follow up outpatient with Dr. Serrano for an outpatient fistulogram planning   - continue compression and elevation of LUE  - appreciate care per primary team  - please contact us with questions or concerns    Vascular Surgery   [phone] 
74 year old male PMHx bilateral RCC (Clear cell) s/p R +L nephrectomy, prostate CA s/p XRT, ESRD previously on HD s/p renal transplant (DDRT +HCV) now w/ CKD3b, Hydronephrosis of transplant kidney 2/2 ureteral stricture s/p ureteral stent, T2DM, HTN, HLD, HCV s/p treatment, bilateral TKR, and hyperhomocysteinemia who presents for LUE swelling.     UA (12/18) 281 WBC  UCx (12/18) >100K Klebsiella pneumoniae  Transplant Kidney US (12/18) No evidence of a significant renal artery stenosis. Moderate hydroureteronephrosis of the RLQ renal transplant.    Review of outpatient [facility] labs reveals that all of patient's urinalyses over the past year have had pyuria and patient's urine cultures have generally grown Klebsiella pneumoniae. Patient at the present without any urinary or systemic symptoms and without leukocytosis. Favor this positive culture is representative of asymptomatic bacteriuria.     #Positive Urine Culture, Renal Transplant Recipient  --s/p Ceftriaxone x1 dose  --UA shows WBCs and many bacteria  --CT- shows hydronephrosis and possible cystitis  -- patient remains asymprtomatic  -- would start oral ciprofloxacin based upon sensitivity pattern of klebsiella strains 500mg a day    --Will require antibiotic prophylaxis prior to any urologic procedure such as ureteral stent exchange      Nate Bay MD  Can be called via Teams  After 5pm/weekends [phone]      
74m, hx RCC s/p b/l nephrectomy, c/b ESRD s/p b/l renal transplant c/b strictures  w/ CKD3b managed w/ chronic ureteral stents who presented w/ LUE swelling that started 4 days ago. On presentation he was found to have an CHELA, w/ moderate hydro of his transplant kidney, prompting urology consult. His stent was last changed Aug 2023 by Dr. Phillip; was due for a stent exchange this past July but unfortunately lost to follow up. Denies  fevers, chills, pain, N/V, hematuria or dysuria.     AVSS, no CVA tenderness on exam.  Labs 5.9/10.5/3.69(baseline 2.1)  UA w/ large leuks, neg nitrite, 281 wbc.   UCX growing GNR. Pt is on Bactrim.  Pt had RBUS w/ moderate hydroureteronephrosis, and partially visualized stent.    Recommendations:   - Empiric abx  - F/u culture -> positive for klebsiella  - CTAP non con to evaluate for encrustation of stents -> stents do not look encrusted but CT shows hydronephrosis in transplant kidney with air in the collecting system and bladder. Recommend placement of armenta catheter. After catheter has been placed will trend Cr and potentially reimage with US to see if hydronephrosis improving  - Patient is now s/p armenta catheter placement and Cr is downtrending today with Cr of 3.82 from 4.17. Discussed with primary team and plan is for possible d/c with armenta catheter and abx for emphysematous pyelitis (would treat with 10-14 day total duration of abx). Okay with discharge as long as patient has close follow-up and can get a repeat BMP later this week with repeat RBUS to assess hydronephrosis. If patient is planned to stay inpatient then would continue to trend Cr and plan for repeat RBUS likely tomorrow vs day after to assess hydronephrosis (48-72hrs from catheter placement).   - Plan for outpatient stent exchange with Marjan after discharge  - Urology will follow  - Discussed with Dr. Nehemias Bang [facility]  [address] [place] [phone]      
74m, hx RCC s/p b/l nephrectomy, c/b ESRD s/p b/l renal transplant c/b strictures  w/ CKD3b managed w/ chronic ureteral stents who presented w/ LUE swelling that started 4 days ago. On presentation he was found to have an CHELA, w/ moderate hydro of his transplant kidney, prompting urology consult. His stent was last changed Aug 2023 by Dr. Phillip; was due for a stent exchange this past July but unfortunately lost to follow up. Denies  fevers, chills, pain, N/V, hematuria or dysuria.     AVSS, no CVA tenderness on exam.  Labs 5.9/10.5/3.69(baseline 2.1)  UA w/ large leuks, neg nitrite, 281 wbc.   UCX growing GNR. Pt is on Bactrim.  Pt had RBUS w/ moderate hydroureteronephrosis, and partially visualized stent.    Recommendations:   - Empiric abx  - F/u culture -> positive for klebsiella  - CTAP non con to evaluate for encrustation of stents -> stents do not look encrusted but CT shows hydronephrosis in transplant kidney with air in the collecting system and bladder. Recommend placement of armenta catheter. After catheter has been placed will trend Cr and potentially reimage with US to see if hydronephrosis improving  - Plan for outpatient stent exchange with Marjan after discharge  - Urology will follow  - Discussed with Dr. Nehemias Bang [facility]  [address] [place] [phone]      
74 year old male PMHx bilateral RCC (Clear cell) s/p R +L nephrectomy, prostate CA s/p XRT, ESRD previously on HD s/p renal transplant (DDRT +HCV) now w/ CKD3b, Hydronephrosis of transplant kidney 2/2 ureteral stricture s/p ureteral stent, T2DM, HTN, HLD, HCV s/p treatment, bilateral TKR, and hyperhomocysteinemia who presents for LUE swelling.     UA (12/18) 281 WBC  UCx (12/18) >100K Klebsiella pneumoniae  Transplant Kidney US (12/18) No evidence of a significant renal artery stenosis. Moderate hydroureteronephrosis of the RLQ renal transplant.    Review of outpatient [facility] labs reveals that all of patient's urinalyses over the past year have had pyuria and patient's urine cultures have generally grown Klebsiella pneumoniae. Patient at the present without any urinary or systemic symptoms and without leukocytosis. Favor this positive culture is representative of asymptomatic bacteriuria.     #Positive Urine Culture, Renal Transplant Recipient  --Recommend Ciprofloxacin 500 mg PO Q24H through 12/28 to complete 7 day course. Please also provide patient with extra dose of Ciprofloxacin to be taken day prior to ureteral stent exchange as outpatient.     I will sign off at this time. Please feel free to contact me with any further questions or concerns.    Zackery Hall M.D.  [facility] Division of Infectious Disease  8AM-5PM Monday - Friday: Available on [employer] Teams  After Hours and Holidays (or if no response on [employer] Teams): Please contact the Infectious Diseases Office at [phone]     The above assessment and plan were discussed with medicine PA
74yoM w/ PMHx of bilateral RCC (Clear cell) s/p R +L nephrectomy, prostate CA s/p XRT, ESRD previously on HD s/p renal transplant (DDRT +HCV) now w/ CKD3b, Hydronephrosis of transplant kidney 2/2 ureteral stricture s/p ureteral stent, 2DM, HTN, HLD, HCV s/p treatment, bilateral TKR, and hyperhomocysteinemia who presents for LUE swelling. LUE US negative for DVT, but does have LUE AVF w/ history of clot.
73 y/o male PMHx bilateral RCC (Clear cell) s/p R +L nephrectomy, prostate CA s/p XRT, ESRD previously on HD s/p renal transplant (DDRT +HCV) now w/ CKD3b, Hydronephrosis of transplant kidney 2/2 ureteral stricture s/p ureteral stent, 2DM, HTN, HLD, HCV s/p treatment, bilateral TKR, and hyperhomocysteinemia who presents for LUE swelling. LUE US negative for DVT, but does have LUE AVF w/ history of clot. Admitted for fistulogram and further monitoring. 
74yoM w/ PMHx of bilateral RCC (Clear cell) s/p R +L nephrectomy, prostate CA s/p XRT, ESRD previously on HD s/p renal transplant (DDRT +HCV) now w/ CKD3b, Hydronephrosis of transplant kidney 2/2 ureteral stricture s/p ureteral stent, 2DM, HTN, HLD, HCV s/p treatment, bilateral TKR, and hyperhomocysteinemia who presents for LUE swelling. LUE US negative for DVT, but does have LUE AVF w/ history of clot.
75 y/o male PMHx bilateral RCC (Clear cell) s/p R +L nephrectomy, prostate CA s/p XRT, ESRD previously on HD s/p renal transplant (DDRT +HCV) now w/ CKD3b, Hydronephrosis of transplant kidney 2/2 ureteral stricture s/p ureteral stent, 2DM, HTN, HLD, HCV s/p treatment, bilateral TKR, and hyperhomocysteinemia who presents for LUE swelling. LUE US negative for DVT, but does have LUE AVF w/ history of clot. Admitted for fistulogram and further monitoring. 
74yoM w/ PMHx of bilateral RCC (Clear cell) s/p R +L nephrectomy, prostate CA s/p XRT, ESRD previously on HD s/p renal transplant (DDRT +HCV) now w/ CKD3b, Hydronephrosis of transplant kidney 2/2 ureteral stricture s/p ureteral stent, 2DM, HTN, HLD, HCV s/p treatment, bilateral TKR, and hyperhomocysteinemia who presents for LUE swelling. LUE US negative for DVT, but does have LUE AVF w/ history of clot.
75 y/o male PMHx bilateral RCC (Clear cell) s/p R +L nephrectomy, prostate CA s/p XRT, ESRD previously on HD s/p renal transplant (DDRT +HCV) now w/ CKD3b, Hydronephrosis of transplant kidney 2/2 ureteral stricture s/p ureteral stent, 2DM, HTN, HLD, HCV s/p treatment, bilateral TKR, and hyperhomocysteinemia who presents for LUE swelling. LUE US negative for DVT, but does have LUE AVF w/ history of clot.
73 y/o male PMHx bilateral RCC (Clear cell) s/p R +L nephrectomy, prostate CA s/p XRT, ESRD previously on HD s/p renal transplant (DDRT +HCV) now w/ CKD3b, Hydronephrosis of transplant kidney 2/2 ureteral stricture s/p ureteral stent, 2DM, HTN, HLD, HCV s/p treatment, bilateral TKR, and hyperhomocysteinemia who presents for LUE swelling. LUE US negative for DVT, but does have LUE AVF w/ history of clot. Admitted for fistulogram and further monitoring.

## 2024-12-24 NOTE — PROGRESS NOTE ADULT - PROBLEM SELECTOR PLAN 2
- Cr in the 4s, increased from last known baseline of 2.16 in January  - Could be CHELA vs progression of CKD  - Transplant kidney US noted   - UA w/ large LE, 281 WBC, bacteria TNTC but patient w/o urinary symptoms  - Urine cx with klebsiella   - Per ID recs asymptomatic bacteruria, monitor off abx    - Transplant nephro follow up   - Has ureteral stent due to ureteral stricture leading to acquired hydronephrosis of kidney transplant, is due for exchange per patient, last one was in August 2023  - Renal sono with moderate hydronephrosis   - CT A/P reviewed  - Velez to be placed per urology
- Cr 3.43, increased from last known baseline of 2.16 in January  - Could be CHELA vs progression of CKD  - Transplant kidney US noted   - UA w/ large LE, 281 WBC, bacteria TNTC but patient w/o urinary symptoms, would wait until UCx to determine abx need  - Transplant nephro consult pending   - Has ureteral stent due to ureteral stricture leading to acquired hydronephrosis of kidney transplant, is due for exchange per patient, last one was in August 2023
- Cr in the 4s, increased from last known baseline of 2.16 in January  - Could be CHELA vs progression of CKD  - Transplant kidney US noted   - UA w/ large LE, 281 WBC, bacteria TNTC but patient w/o urinary symptoms  - Urine cx with klebsiella   - Started on cipro per ID for ppx   - Transplant nephro follow up   - Has ureteral stent due to ureteral stricture leading to acquired hydronephrosis of kidney transplant, is due for exchange per patient, last one was in August 2023  - Renal sono with moderate hydronephrosis   - CT A/P reviewed  - Velez placed   - Renal sono with decreased hydronephrosis, outpt follow up
- Cr 3.69, increased from last known baseline of 2.16 in January  - Could be CHELA vs progression of CKD  - Transplant kidney US noted   - UA w/ large LE, 281 WBC, bacteria TNTC but patient w/o urinary symptoms  - Urine cx with klebsiella   - Start IV ceftriaxone   - ID eval   - Transplant nephro follow up   - Has ureteral stent due to ureteral stricture leading to acquired hydronephrosis of kidney transplant, is due for exchange per patient, last one was in August 2023  - Renal sono with moderate hydronephrosis   - Check CT A/P per urology
- Cr 4.23, increased from last known baseline of 2.16 in January  - Could be CHELA vs progression of CKD  - Transplant kidney US noted   - UA w/ large LE, 281 WBC, bacteria TNTC but patient w/o urinary symptoms  - Urine cx with klebsiella   - Per ID recs asymptomatic bacteruria, monitor off abx    - Transplant nephro follow up   - Has ureteral stent due to ureteral stricture leading to acquired hydronephrosis of kidney transplant, is due for exchange per patient, last one was in August 2023  - Renal sono with moderate hydronephrosis   - Check CT A/P per urology
- Cr 3.69, increased from last known baseline of 2.16 in January  - Could be CHELA vs progression of CKD  - Transplant kidney US noted   - UA w/ large LE, 281 WBC, bacteria TNTC but patient w/o urinary symptoms, would wait until UCx to determine abx need  - Transplant nephro follow up   - Has ureteral stent due to ureteral stricture leading to acquired hydronephrosis of kidney transplant, is due for exchange per patient, last one was in August 2023  - Renal sono with moderate hydronephrosis   - Urology eval pending
- Cr in the 4s, increased from last known baseline of 2.16 in January  - Could be CHELA vs progression of CKD  - Transplant kidney US noted   - UA w/ large LE, 281 WBC, bacteria TNTC but patient w/o urinary symptoms  - Urine cx with klebsiella   - Started on cipro per ID for ppx   - Transplant nephro follow up   - Has ureteral stent due to ureteral stricture leading to acquired hydronephrosis of kidney transplant, is due for exchange per patient, last one was in August 2023  - Renal sono with moderate hydronephrosis   - CT A/P reviewed  - Velez placed   - Follow up renal sono in am per urology

## 2024-12-24 NOTE — PROGRESS NOTE ADULT - PROBLEM SELECTOR PLAN 7
- Hb at 11.1  - Gets iron infusions outpatient  - C/w folic acid 1mg daily, B12 1000mcg daily, B6 100mg daily and Niacin 1000mg

## 2024-12-24 NOTE — PROGRESS NOTE ADULT - PROBLEM SELECTOR PLAN 5
- C/w Nifedipine 60mg daily  - C/w metoprolol succinate 100mg daily

## 2024-12-24 NOTE — PROGRESS NOTE ADULT - PROBLEM SELECTOR PLAN 3
- S/p R Kidney transplant 2018  - C/w Envarsus 4mg daily - check tacrolimus trough 30 min prior to dose but do not hold dose  - C/w MMF 500mg BID  - C/w prednisone 5mg daily  - C/w bactrim 400-80mg daily for PCP prophylaxis

## 2024-12-24 NOTE — PROGRESS NOTE ADULT - REASON FOR ADMISSION
LUE swelling

## 2024-12-24 NOTE — CHART NOTE - NSCHARTNOTEFT_GEN_A_CORE
Patient had repeat RBUS today which showed decreased hydronephrosis from prior US but still shows some air in the collecting system likely 2/2 armenta catheter placement and emphysematous pyelitis. Would recommend treatment course of abx for 2 weeks total duration, can confirm with ID.    Patient should follow-up in outpatient setting with Dr. Phillip with armenta catheter for repeat RBUS and consideration for transplant stent exchange as well as possible outlet procedure. Discussed with Dr. Del Cid.     [facility]  [address] [place] [address], [address] [address] [phone]

## 2024-12-24 NOTE — PROGRESS NOTE ADULT - PROBLEM SELECTOR PROBLEM 2
08-Jan-2017 15:47
Acute kidney injury superimposed on CKD

## 2024-12-24 NOTE — PROGRESS NOTE ADULT - PROBLEM SELECTOR PLAN 9
DVT PPx: HSQ  PT- no PT needs     D/w wife 12/19 updated on full plan of care
DVT PPx: HSQ  PT- no PT needs     D/w wife 12/20 updated on full plan of care
DVT PPx: HSQ
DVT PPx: HSQ  PT- no PT needs
DVT PPx: HSQ  PT- no PT needs     D/w wife 12/20 updated on full plan of care

## 2024-12-24 NOTE — PROGRESS NOTE ADULT - NSPROGADDITIONALINFOA_GEN_ALL_CORE
D/w JENNIFER Romano
D/w ACP Spogmay
D/w ACP Zeta
D/w JENNIFER Smith
D/w JENNIFER Smith
D/w ACP Spogmay

## 2024-12-24 NOTE — PROGRESS NOTE ADULT - SUBJECTIVE AND OBJECTIVE BOX
Patient is a 74y old  Male who presents with a chief complaint of LUE swelling (23 Dec 2024 17:12)      SUBJECTIVE / OVERNIGHT EVENTS: pt feels ok, denies cp, sob, chills, abdominal pain     MEDICATIONS  (STANDING):  allopurinol 100 milliGRAM(s) Oral daily  atorvastatin 10 milliGRAM(s) Oral at bedtime  ciprofloxacin     Tablet 500 milliGRAM(s) Oral every 24 hours  citric acid/sodium citrate Solution 15 milliLiter(s) Oral daily  cyanocobalamin 1000 MICROGram(s) Oral daily  dextrose 5%. 1000 milliLiter(s) (50 mL/Hr) IV Continuous <Continuous>  dextrose 5%. 1000 milliLiter(s) (100 mL/Hr) IV Continuous <Continuous>  dextrose 50% Injectable 25 Gram(s) IV Push once  dextrose 50% Injectable 12.5 Gram(s) IV Push once  dextrose 50% Injectable 25 Gram(s) IV Push once  folic acid 1 milliGRAM(s) Oral daily  glucagon  Injectable 1 milliGRAM(s) IntraMuscular once  heparin   Injectable 5000 Unit(s) SubCutaneous every 8 hours  insulin lispro (ADMELOG) corrective regimen sliding scale   SubCutaneous three times a day before meals  metoprolol succinate  milliGRAM(s) Oral daily  mycophenolate mofetil 500 milliGRAM(s) Oral two times a day  niacin SR 1000 milliGRAM(s) Oral at bedtime  NIFEdipine XL 60 milliGRAM(s) Oral daily  predniSONE   Tablet 5 milliGRAM(s) Oral daily  pyridoxine 100 milliGRAM(s) Oral daily  tacrolimus ER Tablet (ENVARSUS XR) 2 milliGRAM(s) Oral daily  trimethoprim   80 mG/sulfamethoxazole 400 mG 1 Tablet(s) Oral daily    MEDICATIONS  (PRN):  acetaminophen     Tablet .. 650 milliGRAM(s) Oral every 6 hours PRN Temp greater or equal to 38C (100.4F), Mild Pain (1 - 3)  dextrose Oral Gel 15 Gram(s) Oral once PRN Blood Glucose LESS THAN 70 milliGRAM(s)/deciliter  melatonin 3 milliGRAM(s) Oral at bedtime PRN Insomnia        CAPILLARY BLOOD GLUCOSE      POCT Blood Glucose.: 260 mg/dL (24 Dec 2024 11:59)  POCT Blood Glucose.: 192 mg/dL (24 Dec 2024 08:34)  POCT Blood Glucose.: 220 mg/dL (23 Dec 2024 21:02)  POCT Blood Glucose.: 220 mg/dL (23 Dec 2024 16:52)    I&O's Summary    23 Dec 2024 07:01  -  24 Dec 2024 07:00  --------------------------------------------------------  IN: 640 mL / OUT: 950 mL / NET: -310 mL        PHYSICAL EXAM:  GENERAL: NAD, well-developed  HEAD:  Atraumatic, Normocephalic  EYES: EOMI, PERRLA, conjunctiva and sclera clear  NECK: Supple, No JVD  CHEST/LUNG: Clear to auscultation bilaterally; No wheeze  HEART: Regular rate and rhythm; No murmurs, rubs, or gallops  ABDOMEN: Soft, Nontender, Nondistended; Bowel sounds present  EXTREMITIES:  2+ Peripheral Pulses, No clubbing, cyanosis, or edema  PSYCH: AAOx3  NEUROLOGY: non-focal  SKIN: No rashes or lesions    LABS:                        11.1   7.40  )-----------( 185      ( 24 Dec 2024 07:43 )             35.4     12-24    143  |  106  |  35[H]  ----------------------------<  139[H]  4.7   |  17[L]  |  4.16[H]    Ca    9.8      24 Dec 2024 07:43            Urinalysis Basic - ( 24 Dec 2024 07:43 )    Color: x / Appearance: x / SG: x / pH: x  Gluc: 139 mg/dL / Ketone: x  / Bili: x / Urobili: x   Blood: x / Protein: x / Nitrite: x   Leuk Esterase: x / RBC: x / WBC x   Sq Epi: x / Non Sq Epi: x / Bacteria: x        RADIOLOGY & ADDITIONAL TESTS:    Imaging Personally Reviewed:    Consultant(s) Notes Reviewed:      Care Discussed with Consultants/Other Providers:

## 2024-12-24 NOTE — PROGRESS NOTE ADULT - PROVIDER SPECIALTY LIST ADULT
Hospitalist
Urology
Hospitalist
Urology
Transplant ID
Transplant Nephrology
Vascular Surgery
Transplant ID
Hospitalist

## 2024-12-24 NOTE — DISCHARGE NOTE NURSING/CASE MANAGEMENT/SOCIAL WORK - NSDCVIVACCINE_GEN_ALL_CORE_FT
influenza, injectable, quadrivalent, preservative free; 15-Sep-2016 13:35; Zulay Diego (RN); [employer]; [id number]; IntraMuscular; Deltoid Right.; 0.5 milliLiter(s); VIS (VIS Published: 07-Aug-2015, VIS Presented: 15-Sep-2016);   influenza, high-dose, quadrivalent; 09-Nov-2021 15:51; Anna Taylor (RN); [employer]; [id number] (Exp. Date: 30-Jun-2022); IntraMuscular; Deltoid Right.; 0.7 milliLiter(s); VIS (VIS Published: 06-Aug-2021, VIS Presented: 09-Nov-2021);

## 2024-12-24 NOTE — PROGRESS NOTE ADULT - PROBLEM SELECTOR PLAN 4
- On Glimepiride 2mg BID + Rybelsus 14mg at home  - RABIA while admitted
- On Glimepiride 2mg BID + Rybelsus 14mg at home  - RABIA while admitted  - F/u HbA1c
- On Glimepiride 2mg BID + Rybelsus 14mg at home  - RABIA while admitted

## 2024-12-24 NOTE — PROGRESS NOTE ADULT - PROBLEM SELECTOR PLAN 6
- C/w atorvastatin 10mg QHS

## 2024-12-24 NOTE — PROGRESS NOTE ADULT - PROBLEM SELECTOR PROBLEM 1
Left upper extremity swelling

## 2024-12-24 NOTE — PROGRESS NOTE ADULT - PROBLEM SELECTOR PLAN 1
- Patient with 4 days of LUE swelling  - LUE Duplex negative for DVT  - Oupt fistulogram planning with vascular   - No further recs from vascular   - Keep LUE elevated
- Patient with 4 days of LUE swelling  - LUE Duplex negative for DVT  - Oupt fistulogram planning with vascular   - No further recs from vascular   - Keep LUE elevated
- Patient with 4 days of LUE swelling  - LUE Duplex negative for DVT  - No further recs from vascular   - Keep LUE elevated
- Patient with 4 days of LUE swelling  - LUE Duplex negative for DVT  - Oupt fistulogram planning with vascular   - No further recs from vascular   - Keep LUE elevated
- Patient with 4 days of LUE swelling  - LUE Duplex negative for DVT  - No further recs from vascular   - Keep LUE elevated
- Patient with 4 days of LUE swelling  - LUE Duplex negative for DVT  - Oupt fistulogram planning with vascular   - No further recs from vascular   - Keep LUE elevated
- Patient with 4 days of LUE swelling  - LUE Duplex negative for DVT  - Vascular consulted, recommended LUE fistulogram, ordered, f/u results  - Patient does have history of LUE AVF clot   - Keep LUE elevated

## 2024-12-24 NOTE — DISCHARGE NOTE NURSING/CASE MANAGEMENT/SOCIAL WORK - PATIENT PORTAL LINK FT
You can access the FollowMyHealth Patient Portal offered by [facility] by registering at the following website: [email or web address]. By joining [facility]’s FollowMyHealth portal, you will also be able to view your health information using other applications (apps) compatible with our system.

## 2024-12-24 NOTE — DISCHARGE NOTE NURSING/CASE MANAGEMENT/SOCIAL WORK - FINANCIAL ASSISTANCE
[facility] provides services at a reduced cost to those who are determined to be eligible through [facility]’s financial assistance program. Information regarding [facility]’s financial assistance program can be found by going to [email or web address] or by calling [phone].

## 2024-12-24 NOTE — PROGRESS NOTE ADULT - PROBLEM SELECTOR PROBLEM 3
History of kidney transplant

## 2024-12-24 NOTE — PROGRESS NOTE ADULT - TIME BILLING
- Ordering, reviewing, and interpreting labs, testing, and imaging.  - Independently obtaining a review of systems and performing a physical exam  - Reviewing consultant documentation/recommendations in addition to discussing plan of care with consultants.  - Counselling and educating patient and family regarding interpretation of aforementioned items and plan of care.
conducting history and physical examination, reviewing and ordering diagnostic workup as above, discussing with consultants, determining acute diagnoses / plan for continued monitoring and management, and communication with patient.
DC time 50 min
- Ordering, reviewing, and interpreting labs, testing, and imaging.  - Independently obtaining a review of systems and performing a physical exam  - Reviewing consultant documentation/recommendations in addition to discussing plan of care with consultants.  - Counselling and educating patient and family regarding interpretation of aforementioned items and plan of care.

## 2024-12-24 NOTE — PROGRESS NOTE ADULT - PROBLEM SELECTOR PLAN 8
- C/w allopurinol 100mg daily - provide patient with prescription on d/c, states he ran out

## 2024-12-28 LAB
CULTURE RESULTS: SIGNIFICANT CHANGE UP
SPECIMEN SOURCE: SIGNIFICANT CHANGE UP

## 2025-01-14 ENCOUNTER — APPOINTMENT (OUTPATIENT)
Dept: UROLOGY | Facility: CLINIC | Age: 75
End: 2025-01-14
Payer: MEDICARE

## 2025-01-14 ENCOUNTER — OUTPATIENT (OUTPATIENT)
Dept: OUTPATIENT SERVICES | Facility: HOSPITAL | Age: 75
LOS: 1 days | End: 2025-01-14
Payer: MEDICARE

## 2025-01-14 VITALS — HEART RATE: 105 BPM | DIASTOLIC BLOOD PRESSURE: 67 MMHG | SYSTOLIC BLOOD PRESSURE: 128 MMHG | OXYGEN SATURATION: 97 %

## 2025-01-14 DIAGNOSIS — C61 MALIGNANT NEOPLASM OF PROSTATE: ICD-10-CM

## 2025-01-14 DIAGNOSIS — N13.5 OTHER COMPLICATION OF KIDNEY TRANSPLANT: ICD-10-CM

## 2025-01-14 DIAGNOSIS — Z98.890 OTHER SPECIFIED POSTPROCEDURAL STATES: Chronic | ICD-10-CM

## 2025-01-14 DIAGNOSIS — Z96.652 PRESENCE OF LEFT ARTIFICIAL KNEE JOINT: Chronic | ICD-10-CM

## 2025-01-14 DIAGNOSIS — T86.19 OTHER COMPLICATION OF KIDNEY TRANSPLANT: ICD-10-CM

## 2025-01-14 DIAGNOSIS — Z94.0 KIDNEY TRANSPLANT STATUS: Chronic | ICD-10-CM

## 2025-01-14 DIAGNOSIS — I77.0 ARTERIOVENOUS FISTULA, ACQUIRED: Chronic | ICD-10-CM

## 2025-01-14 DIAGNOSIS — Z90.79 ACQUIRED ABSENCE OF OTHER GENITAL ORGAN(S): Chronic | ICD-10-CM

## 2025-01-14 DIAGNOSIS — A63.0 ANOGENITAL (VENEREAL) WARTS: Chronic | ICD-10-CM

## 2025-01-14 PROCEDURE — 99214 OFFICE O/P EST MOD 30 MIN: CPT | Mod: 25

## 2025-01-14 PROCEDURE — 51700 IRRIGATION OF BLADDER: CPT

## 2025-01-21 ENCOUNTER — APPOINTMENT (OUTPATIENT)
Dept: VASCULAR SURGERY | Facility: CLINIC | Age: 75
End: 2025-01-21
Payer: MEDICARE

## 2025-01-21 VITALS
SYSTOLIC BLOOD PRESSURE: 135 MMHG | WEIGHT: 202 LBS | TEMPERATURE: 98.3 F | DIASTOLIC BLOOD PRESSURE: 71 MMHG | HEART RATE: 101 BPM | HEIGHT: 70.5 IN | BODY MASS INDEX: 28.6 KG/M2

## 2025-01-21 DIAGNOSIS — N18.4 CHRONIC KIDNEY DISEASE, STAGE 4 (SEVERE): ICD-10-CM

## 2025-01-21 DIAGNOSIS — C61 MALIGNANT NEOPLASM OF PROSTATE: ICD-10-CM

## 2025-01-21 DIAGNOSIS — Z94.0 KIDNEY TRANSPLANT STATUS: ICD-10-CM

## 2025-01-21 DIAGNOSIS — T86.19 OTHER COMPLICATION OF KIDNEY TRANSPLANT: ICD-10-CM

## 2025-01-21 PROCEDURE — 99214 OFFICE O/P EST MOD 30 MIN: CPT

## 2025-01-23 ENCOUNTER — APPOINTMENT (OUTPATIENT)
Dept: NEPHROLOGY | Facility: CLINIC | Age: 75
End: 2025-01-23
Payer: MEDICARE

## 2025-01-23 VITALS
HEART RATE: 71 BPM | RESPIRATION RATE: 16 BRPM | SYSTOLIC BLOOD PRESSURE: 160 MMHG | DIASTOLIC BLOOD PRESSURE: 75 MMHG | OXYGEN SATURATION: 97 % | TEMPERATURE: 98 F | BODY MASS INDEX: 29.2 KG/M2 | WEIGHT: 204 LBS | HEIGHT: 70 IN

## 2025-01-23 VITALS — DIASTOLIC BLOOD PRESSURE: 60 MMHG | SYSTOLIC BLOOD PRESSURE: 140 MMHG

## 2025-01-23 PROCEDURE — 99214 OFFICE O/P EST MOD 30 MIN: CPT

## 2025-01-23 RX ORDER — KRILL/OM-3/DHA/EPA/PHOSPHO/AST 1000-230MG
81 CAPSULE ORAL
Refills: 0 | Status: ACTIVE | COMMUNITY
Start: 2025-01-23

## 2025-01-24 ENCOUNTER — NON-APPOINTMENT (OUTPATIENT)
Age: 75
End: 2025-01-24

## 2025-01-24 LAB
ALBUMIN SERPL ELPH-MCNC: 3.9 G/DL
ALP BLD-CCNC: 92 U/L
ALT SERPL-CCNC: 52 U/L
ANION GAP SERPL CALC-SCNC: 13 MMOL/L
APPEARANCE: CLEAR
AST SERPL-CCNC: 49 U/L
BACTERIA: NEGATIVE /HPF
BASOPHILS # BLD AUTO: 0.03 K/UL
BASOPHILS NFR BLD AUTO: 0.5 %
BILIRUB SERPL-MCNC: 0.2 MG/DL
BILIRUBIN URINE: NEGATIVE
BKV DNA SPEC QL NAA+PROBE: NOT DETECTED IU/ML
BLOOD URINE: ABNORMAL
BUN SERPL-MCNC: 37 MG/DL
CALCIUM SERPL-MCNC: 9.5 MG/DL
CALCIUM SERPL-MCNC: 9.5 MG/DL
CAST: 4 /LPF
CHLORIDE SERPL-SCNC: 107 MMOL/L
CMV DNA SPEC QL NAA+PROBE: NOT DETECTED IU/ML
CMVPCR LOG: NOT DETECTED LOG10IU/ML
CO2 SERPL-SCNC: 22 MMOL/L
COLOR: YELLOW
CREAT SERPL-MCNC: 3.18 MG/DL
CREAT SPEC-SCNC: 166 MG/DL
CREAT/PROT UR: 1 RATIO
EGFR: 20 ML/MIN/1.73M2
EOSINOPHIL # BLD AUTO: 0.11 K/UL
EOSINOPHIL NFR BLD AUTO: 1.8 %
EPITHELIAL CELLS: 2 /HPF
GLUCOSE QUALITATIVE U: NEGATIVE MG/DL
GLUCOSE SERPL-MCNC: 97 MG/DL
HCT VFR BLD CALC: 34.2 %
HGB BLD-MCNC: 10.7 G/DL
IMM GRANULOCYTES NFR BLD AUTO: 0.2 %
KETONES URINE: NEGATIVE MG/DL
LEUKOCYTE ESTERASE URINE: ABNORMAL
LYMPHOCYTES # BLD AUTO: 3 K/UL
LYMPHOCYTES NFR BLD AUTO: 49.6 %
MAGNESIUM SERPL-MCNC: 1.6 MG/DL
MAN DIFF?: NORMAL
MCHC RBC-ENTMCNC: 29.2 PG
MCHC RBC-ENTMCNC: 31.3 G/DL
MCV RBC AUTO: 93.2 FL
MICROSCOPIC-UA: NORMAL
MONOCYTES # BLD AUTO: 0.51 K/UL
MONOCYTES NFR BLD AUTO: 8.4 %
NEUTROPHILS # BLD AUTO: 2.39 K/UL
NEUTROPHILS NFR BLD AUTO: 39.5 %
NITRITE URINE: NEGATIVE
PARATHYROID HORMONE INTACT: 166 PG/ML
PH URINE: 5.5
PHOSPHATE SERPL-MCNC: 3.4 MG/DL
PLATELET # BLD AUTO: 214 K/UL
POTASSIUM SERPL-SCNC: 4.8 MMOL/L
PROT SERPL-MCNC: 6.4 G/DL
PROT UR-MCNC: 170 MG/DL
PROTEIN URINE: 300 MG/DL
RBC # BLD: 3.67 M/UL
RBC # FLD: 15 %
RED BLOOD CELLS URINE: 1 /HPF
SODIUM SERPL-SCNC: 142 MMOL/L
SPECIFIC GRAVITY URINE: 1.01
TACROLIMUS SERPL-MCNC: 8.9 NG/ML
UROBILINOGEN URINE: 0.2 MG/DL
WBC # FLD AUTO: 6.05 K/UL
WHITE BLOOD CELLS URINE: 20 /HPF

## 2025-01-28 ENCOUNTER — RX RENEWAL (OUTPATIENT)
Age: 75
End: 2025-01-28

## 2025-01-28 ENCOUNTER — TRANSCRIPTION ENCOUNTER (OUTPATIENT)
Age: 75
End: 2025-01-28

## 2025-01-29 ENCOUNTER — APPOINTMENT (OUTPATIENT)
Dept: VASCULAR SURGERY | Facility: HOSPITAL | Age: 75
End: 2025-01-29

## 2025-01-29 ENCOUNTER — OUTPATIENT (OUTPATIENT)
Dept: OUTPATIENT SERVICES | Facility: HOSPITAL | Age: 75
LOS: 1 days | Discharge: HOME | End: 2025-01-29
Payer: MEDICARE

## 2025-01-29 ENCOUNTER — TRANSCRIPTION ENCOUNTER (OUTPATIENT)
Age: 75
End: 2025-01-29

## 2025-01-29 VITALS
RESPIRATION RATE: 14 BRPM | SYSTOLIC BLOOD PRESSURE: 158 MMHG | HEART RATE: 81 BPM | TEMPERATURE: 98 F | WEIGHT: 203.93 LBS | DIASTOLIC BLOOD PRESSURE: 67 MMHG | HEIGHT: 70 IN | OXYGEN SATURATION: 100 %

## 2025-01-29 VITALS
RESPIRATION RATE: 15 BRPM | DIASTOLIC BLOOD PRESSURE: 67 MMHG | OXYGEN SATURATION: 99 % | SYSTOLIC BLOOD PRESSURE: 119 MMHG | HEART RATE: 85 BPM

## 2025-01-29 DIAGNOSIS — Z98.890 OTHER SPECIFIED POSTPROCEDURAL STATES: Chronic | ICD-10-CM

## 2025-01-29 DIAGNOSIS — N18.6 END STAGE RENAL DISEASE: ICD-10-CM

## 2025-01-29 DIAGNOSIS — Z94.0 KIDNEY TRANSPLANT STATUS: Chronic | ICD-10-CM

## 2025-01-29 DIAGNOSIS — Z90.5 ACQUIRED ABSENCE OF KIDNEY: Chronic | ICD-10-CM

## 2025-01-29 DIAGNOSIS — I77.0 ARTERIOVENOUS FISTULA, ACQUIRED: Chronic | ICD-10-CM

## 2025-01-29 LAB
ANION GAP SERPL CALC-SCNC: 12 MMOL/L — SIGNIFICANT CHANGE UP (ref 7–14)
BUN SERPL-MCNC: 40 MG/DL — HIGH (ref 7–23)
CALCIUM SERPL-MCNC: 9.9 MG/DL — SIGNIFICANT CHANGE UP (ref 8.4–10.5)
CHLORIDE SERPL-SCNC: 106 MMOL/L — SIGNIFICANT CHANGE UP (ref 98–107)
CO2 SERPL-SCNC: 23 MMOL/L — SIGNIFICANT CHANGE UP (ref 22–31)
CREAT SERPL-MCNC: 3.07 MG/DL — HIGH (ref 0.5–1.3)
EGFR: 20 ML/MIN/1.73M2 — LOW
GLUCOSE BLDC GLUCOMTR-MCNC: 132 MG/DL — HIGH (ref 70–99)
GLUCOSE SERPL-MCNC: 128 MG/DL — HIGH (ref 70–99)
HCT VFR BLD CALC: 32.2 % — LOW (ref 39–50)
HGB BLD-MCNC: 10.3 G/DL — LOW (ref 13–17)
MCHC RBC-ENTMCNC: 28.9 PG — SIGNIFICANT CHANGE UP (ref 27–34)
MCHC RBC-ENTMCNC: 32 G/DL — SIGNIFICANT CHANGE UP (ref 32–36)
MCV RBC AUTO: 90.2 FL — SIGNIFICANT CHANGE UP (ref 80–100)
NRBC # BLD AUTO: 0 K/UL — SIGNIFICANT CHANGE UP (ref 0–0)
NRBC # BLD: 0 /100 WBCS — SIGNIFICANT CHANGE UP (ref 0–0)
NRBC # FLD: 0 K/UL — SIGNIFICANT CHANGE UP (ref 0–0)
NRBC BLD-RTO: 0 /100 WBCS — SIGNIFICANT CHANGE UP (ref 0–0)
PLATELET # BLD AUTO: 158 K/UL — SIGNIFICANT CHANGE UP (ref 150–400)
POTASSIUM SERPL-MCNC: 4.6 MMOL/L — SIGNIFICANT CHANGE UP (ref 3.5–5.3)
POTASSIUM SERPL-SCNC: 4.6 MMOL/L — SIGNIFICANT CHANGE UP (ref 3.5–5.3)
RBC # BLD: 3.57 M/UL — LOW (ref 4.2–5.8)
RBC # FLD: 14.6 % — HIGH (ref 10.3–14.5)
SODIUM SERPL-SCNC: 141 MMOL/L — SIGNIFICANT CHANGE UP (ref 135–145)
WBC # BLD: 6.72 K/UL — SIGNIFICANT CHANGE UP (ref 3.8–10.5)
WBC # FLD AUTO: 6.72 K/UL — SIGNIFICANT CHANGE UP (ref 3.8–10.5)

## 2025-01-29 PROCEDURE — 36901 INTRO CATH DIALYSIS CIRCUIT: CPT

## 2025-01-29 PROCEDURE — 76937 US GUIDE VASCULAR ACCESS: CPT | Mod: 26

## 2025-01-29 RX ORDER — BACTERIOSTATIC SODIUM CHLORIDE 0.9 %
3 VIAL (ML) INJECTION EVERY 8 HOURS
Refills: 0 | Status: DISCONTINUED | OUTPATIENT
Start: 2025-01-29 | End: 2025-02-12

## 2025-01-29 NOTE — ASU DISCHARGE PLAN (ADULT/PEDIATRIC) - CARE PROVIDER_API CALL
Bannazadeh, Mohsen  Vascular Surgery  [address], [address]  [place] [address]  Phone: [phone]  Fax: [phone]  Follow Up Time:

## 2025-01-29 NOTE — ASU PREOP CHECKLIST - BP NONINVASIVE DIASTOLIC (MM HG)
Performing Laboratory: [id number] Lab Facility: 0 Bill For Surgical Tray: no Billing Type: Third-Party Bill Expected Date Of Service: 01/17/2024 67

## 2025-01-29 NOTE — H&P CARDIOLOGY - HISTORY OF PRESENT ILLNESS
74  y.o. male presents today for elective AV fistulogram.  74  y.o. male presents today for elective LUE AV fistulogram. The patient has LUE AV fistula created in 2014, presently is not on dialysis due to h/o renal transplant, c/o LUE swelling noted since 12/3/24 getting worse. As per Dr. Serrano's note, the patient was evaluated in ER, had LUE venous doppler, DVT ruled out. Due to patient's symptoms, the patient was recommended to have RUE AV fistulogram. He denies chest pain, SOB, palpitations, dizziness, presyncope, syncope,  headache, visual disturbances, CVA, PE, DVT, FER, abdominal pain, N/V/D/C, hematochezia, melena, dysuria, hematuria, fever, chills.

## 2025-01-29 NOTE — BRIEF OPERATIVE NOTE - OPERATION/FINDINGS
diagnostic LUE fistulogram via 5Fr fistula access. No evidence of peripheral or central venous occlusions or stenosis, anastomosis is patent

## 2025-01-29 NOTE — ASU DISCHARGE PLAN (ADULT/PEDIATRIC) - ASU DC SPECIAL INSTRUCTIONSFT
For the next 3 days:  - Avoid pushing and pulling with the affected wrist.   - Avoid repeated movement of the affected hand and wrist (typing, hammering).  - Do not lift anything more than 5 pounds.    MEDICATION:   - Take your medications as explained (see attached medication reconciliation on discharge paperwork).    ADDITIONAL INSTRUCTIONS:  - Follow a heart healthy diet recommended by your doctor.   - If you smoke, we encourage smoking cessation. You may call [phone] for [place] of tobacco control if you need assistance.  - Call your doctor to make appointment within 2 weeks.    ***CALL YOUR DOCTOR***  - If you experience fever, chills, body aches, or severe pain, swelling, redness, heat, or yellow discharge from your incision site.  - If you notice bleeding or significant swelling at the procedural site.  - If you experience chest pain.  - If you experience extremity numbness, tingling, or temperature change.    If you are unable to get in contact with your doctor, you may contact the [facility] at [phone].  Please reference your discharge instructions for any questions or concerns.

## 2025-01-29 NOTE — H&P CARDIOLOGY - NSICDXPASTSURGICALHX_GEN_ALL_CORE_FT
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 s/p Reversal    Kidney transplant recipient 2018  @ [facility] :  +  Hep C Donor    S/P anal fissurectomy and chemical denervation  with biopsy and fulguration of anal lesions, 2021    S/p nephrectomy left 9/15/2015   + Cancer    S/p nephrectomy right 12/10/2015   benign    S/P right knee arthroscopy     S/P total knee replacement, left     S/P TURP (transurethral resection of prostate) and Transplant Kidney Stent Replacemernt, 21

## 2025-01-29 NOTE — ASU DISCHARGE PLAN (ADULT/PEDIATRIC) - NS MD DC FALL RISK RISK
For information on Fall & Injury Prevention, visit: [email or web address] OR  [email or web address] OR  https://www.cdc.gov/steadi/patient.html

## 2025-01-29 NOTE — H&P CARDIOLOGY - EXTREMITIES COMMENTS
LUE - not tender, no signs of trauma, + swelling, +erythema, +increased warmth. L forearm fistula + thrill.

## 2025-01-29 NOTE — ASU PATIENT PROFILE, ADULT - NSICDXPASTSURGICALHX_GEN_ALL_CORE_FT
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 s/p Reversal    Kidney transplant recipient 2018  @ [facility] :  +  Hep C Donor    S/P anal fissurectomy and chemical denervation  with biopsy and fulguration of anal lesions, 2021    S/p nephrectomy left 9/15/2015   + Cancer    S/p nephrectomy right 12/10/2015   benign    S/P right knee arthroscopy     S/P total knee replacement, left     S/P TURP (transurethral resection of prostate) and Transplant Kidney Stent Replacemernt, 21

## 2025-01-29 NOTE — ASU DISCHARGE PLAN (ADULT/PEDIATRIC) - FINANCIAL ASSISTANCE
[facility] provides services at a reduced cost to those who are determined to be eligible through [facility]’s financial assistance program. Information regarding [facility]’s financial assistance program can be found by going to [email or web address] or by calling [phone].

## 2025-01-30 ENCOUNTER — OUTPATIENT (OUTPATIENT)
Dept: OUTPATIENT SERVICES | Facility: HOSPITAL | Age: 75
LOS: 1 days | End: 2025-01-30

## 2025-01-30 VITALS
RESPIRATION RATE: 16 BRPM | OXYGEN SATURATION: 98 % | WEIGHT: 197.98 LBS | DIASTOLIC BLOOD PRESSURE: 55 MMHG | HEART RATE: 85 BPM | SYSTOLIC BLOOD PRESSURE: 137 MMHG | TEMPERATURE: 98 F | HEIGHT: 69 IN

## 2025-01-30 DIAGNOSIS — E11.9 TYPE 2 DIABETES MELLITUS WITHOUT COMPLICATIONS: ICD-10-CM

## 2025-01-30 DIAGNOSIS — Z98.890 OTHER SPECIFIED POSTPROCEDURAL STATES: Chronic | ICD-10-CM

## 2025-01-30 DIAGNOSIS — Z94.0 KIDNEY TRANSPLANT STATUS: ICD-10-CM

## 2025-01-30 DIAGNOSIS — T86.19 OTHER COMPLICATION OF KIDNEY TRANSPLANT: ICD-10-CM

## 2025-01-30 DIAGNOSIS — Z96.653 PRESENCE OF ARTIFICIAL KNEE JOINT, BILATERAL: Chronic | ICD-10-CM

## 2025-01-30 DIAGNOSIS — Z90.5 ACQUIRED ABSENCE OF KIDNEY: Chronic | ICD-10-CM

## 2025-01-30 DIAGNOSIS — Z96.652 PRESENCE OF LEFT ARTIFICIAL KNEE JOINT: Chronic | ICD-10-CM

## 2025-01-30 DIAGNOSIS — Z94.0 KIDNEY TRANSPLANT STATUS: Chronic | ICD-10-CM

## 2025-01-30 DIAGNOSIS — A63.0 ANOGENITAL (VENEREAL) WARTS: Chronic | ICD-10-CM

## 2025-01-30 DIAGNOSIS — I10 ESSENTIAL (PRIMARY) HYPERTENSION: ICD-10-CM

## 2025-01-30 DIAGNOSIS — Z90.79 ACQUIRED ABSENCE OF OTHER GENITAL ORGAN(S): Chronic | ICD-10-CM

## 2025-01-30 DIAGNOSIS — I77.0 ARTERIOVENOUS FISTULA, ACQUIRED: Chronic | ICD-10-CM

## 2025-01-30 RX ORDER — ASPIRIN 81 MG/1
1 TABLET, COATED ORAL
Refills: 0 | DISCHARGE

## 2025-01-30 RX ORDER — PREDNISONE 5 MG/1
1 TABLET ORAL
Refills: 0 | DISCHARGE

## 2025-01-30 RX ORDER — ATORVASTATIN CALCIUM 80 MG/1
1 TABLET, FILM COATED ORAL
Refills: 0 | DISCHARGE

## 2025-01-30 RX ORDER — MYCOPHENOLATE MOFETIL 200 MG/ML
1 POWDER, FOR SUSPENSION ORAL
Refills: 0 | DISCHARGE

## 2025-01-30 RX ORDER — BISACODYL 5 MG
1 TABLET, DELAYED RELEASE (ENTERIC COATED) ORAL
Refills: 0 | DISCHARGE

## 2025-01-30 RX ORDER — OMEPRAZOLE 20 MG/1
1 CAPSULE, DELAYED RELEASE ORAL
Refills: 0 | DISCHARGE

## 2025-01-30 RX ORDER — ASPIRIN 81 MG/1
0 TABLET, COATED ORAL
Refills: 0 | DISCHARGE

## 2025-01-30 RX ORDER — METOPROLOL SUCCINATE 25 MG
1 TABLET, EXTENDED RELEASE 24 HR ORAL
Refills: 0 | DISCHARGE

## 2025-01-30 RX ORDER — SEMAGLUTIDE 0.25 MG/.5ML
1 INJECTION, SOLUTION SUBCUTANEOUS
Refills: 0 | DISCHARGE

## 2025-01-30 RX ORDER — TACROLIMUS 1 MG/1
3 CAPSULE, GELATIN COATED ORAL
Refills: 0 | DISCHARGE

## 2025-01-30 RX ORDER — GLIMEPIRIDE 4 MG/1
1 TABLET ORAL
Refills: 0 | DISCHARGE

## 2025-01-30 RX ORDER — NIFEDIPINE 90 MG/1
1 TABLET, FILM COATED, EXTENDED RELEASE ORAL
Refills: 0 | DISCHARGE

## 2025-01-30 NOTE — H&P PST ADULT - PROBLEM SELECTOR PLAN 4
Pt instructed to take bactrim, prednisone, Envarsus XR, and mycophenolate AM of surgery with a sip of water, pt able to verbalize understanding.  Pt instructed to continue aspirin preop.

## 2025-01-30 NOTE — H&P PST ADULT - ENMT COMMENTS
Denies loose teeth, has partial upper dentures and lower full dentures Denies loose teeth, has partial upper dentures and lower full dentures. Mallampati III

## 2025-01-30 NOTE — H&P PST ADULT - PROBLEM SELECTOR PLAN 1
Pt is scheduled for cystoscopy, exchange of right ureteral stent(transplant kidney) on 2/5/25.  Verbal and written pre op instructions reviewed with patient and pt able to verbalize understanding.     CBC CMP in allscripts from 1/23/25.  Ha1c from 11/2024 7.6%.  Pt states he is taking aspirin for transplanted kidney, pt instructed to continue preop. Pt is scheduled for cystoscopy, exchange of right ureteral stent(transplant kidney) on 2/5/25.  Verbal and written pre op instructions reviewed with patient and pt able to verbalize understanding.     CBC CMP in allscripts from 1/23/25. LFTS slightly elevated - Envarsus was decreased from 4mg QD to 3mg QD by renal.  Ha1c from 11/2024 7.6%.  Pt states he is taking aspirin for transplanted kidney, pt instructed to continue preop.  Pt instructed to take omeprazole AM of surgery with a sip of water, pt able to verbalize understanding.

## 2025-01-30 NOTE — H&P PST ADULT - PROBLEM SELECTOR PLAN 2
Pt instructed to hold glimepiride the evening prior to surgery and day of surgery.  Pt instructed to hold RYBELSUS on the day of surgery.

## 2025-01-30 NOTE — H&P PST ADULT - NSICDXPASTSURGICALHX_GEN_ALL_CORE_FT
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 s/p Reversal    Kidney transplant recipient 2018  @ [facility] :  +  Hep C Donor    S/P anal fissurectomy and chemical denervation  with biopsy and fulguration of anal lesions, 2021    S/p nephrectomy left 9/15/2015   + Cancer    S/p nephrectomy right 12/10/2015   benign    S/P right knee arthroscopy     S/P total knee replacement, left     S/P TURP (transurethral resection of prostate) and Transplant Kidney Stent Replacemernt, 21

## 2025-01-30 NOTE — H&P PST ADULT - HISTORY OF PRESENT ILLNESS
74-year-old male with history of  HTN, T2DM,Hep C, ESRD and kidney and prostate cancer, kidney transplant recipient  in 2018.  His prostate cancer treated with radiation therapy. He finished treatment in 2022. He had developed urinary retention s/p transurethral resection of the prostate. He reports December 2024 his urinary flow was reduced. Then he started to have left arm swelling. He went to the hospital and he was found to have an CHELA with moderate hydro of his transplant kidney.  He had developed obstruction of the transplant ureter requiring treatment with the stent. The stent was last changed in August 2023. He is due for a cystoscopy and exchange of ureteral stent.  74-year-old male with history of  HTN, T2DM, Hep C, ESRD and kidney, renal cancer, prostate cancer, kidney transplant recipient  in 2018.  His prostate cancer treated with radiation therapy. He finished treatment in 2022. He had developed urinary retention s/p transurethral resection of the prostate. He had developed obstruction of the transplant ureter requiring treatment with the stent. The stent was last changed in August 2023. He is due for a cystoscopy and exchange of ureteral stent.      Pt is scheduled for cystoscopy, exchange of right ureteral stent(transplant kidney). 74-year-old male with history of  HTN, T2DM, Hep C, ESRD and kidney, renal cancer, prostate cancer, kidney transplant recipient  in 2018.  His prostate cancer treated with radiation therapy. He finished treatment in 2022. He had developed urinary retention s/p transurethral resection of the prostate. He had developed obstruction of the transplant ureter requiring treatment with the stent. The stent was last changed in August 2023. He is due for a cystoscopy and exchange of ureteral stent.      Pt is scheduled for cystoscopy, exchange of right ureteral stent(transplant kidney).    Patient preadmitted for IV abx d/t e faecium UCx

## 2025-01-30 NOTE — H&P PST ADULT - COMMENTS
LLE swelling x almost 2 months, has been seen by multiple doctors, reports dopplers done, negative for blood clots s/p     Fistulogram 1/29/25 at [facility]  was performed and showed  patent AVF with no evidence of stenosis in central vein.

## 2025-01-30 NOTE — H&P PST ADULT - PROBLEM SELECTOR PLAN 3
Pt instructed to take metoprolol and nifedipine AM of surgery with a sip of water, pt able to verbalize understanding.  FER precautions.

## 2025-02-03 ENCOUNTER — APPOINTMENT (OUTPATIENT)
Dept: COLORECTAL SURGERY | Facility: CLINIC | Age: 75
End: 2025-02-03
Payer: MEDICARE

## 2025-02-03 DIAGNOSIS — K64.4 RESIDUAL HEMORRHOIDAL SKIN TAGS: ICD-10-CM

## 2025-02-03 LAB
-  AMPICILLIN: SIGNIFICANT CHANGE UP
-  CIPROFLOXACIN: SIGNIFICANT CHANGE UP
-  LEVOFLOXACIN: SIGNIFICANT CHANGE UP
-  NITROFURANTOIN: SIGNIFICANT CHANGE UP
-  TETRACYCLINE: SIGNIFICANT CHANGE UP
-  VANCOMYCIN: SIGNIFICANT CHANGE UP
CULTURE RESULTS: ABNORMAL
METHOD TYPE: SIGNIFICANT CHANGE UP
ORGANISM # SPEC MICROSCOPIC CNT: ABNORMAL
ORGANISM # SPEC MICROSCOPIC CNT: ABNORMAL
SPECIMEN SOURCE: SIGNIFICANT CHANGE UP

## 2025-02-03 PROCEDURE — 99212 OFFICE O/P EST SF 10 MIN: CPT

## 2025-02-03 RX ORDER — HYDROCORTISONE 25 MG/G
2.5 CREAM TOPICAL
Qty: 1 | Refills: 4 | Status: ACTIVE | COMMUNITY
Start: 2025-02-03 | End: 1900-01-01

## 2025-02-04 ENCOUNTER — INPATIENT (INPATIENT)
Facility: HOSPITAL | Age: 75
LOS: 1 days | Discharge: HOME | End: 2025-02-06
Attending: UROLOGY | Admitting: UROLOGY
Payer: MEDICARE

## 2025-02-04 VITALS
DIASTOLIC BLOOD PRESSURE: 54 MMHG | SYSTOLIC BLOOD PRESSURE: 115 MMHG | TEMPERATURE: 98 F | RESPIRATION RATE: 17 BRPM | HEART RATE: 94 BPM | OXYGEN SATURATION: 100 %

## 2025-02-04 DIAGNOSIS — Z98.890 OTHER SPECIFIED POSTPROCEDURAL STATES: Chronic | ICD-10-CM

## 2025-02-04 DIAGNOSIS — A63.0 ANOGENITAL (VENEREAL) WARTS: Chronic | ICD-10-CM

## 2025-02-04 DIAGNOSIS — Z90.5 ACQUIRED ABSENCE OF KIDNEY: Chronic | ICD-10-CM

## 2025-02-04 DIAGNOSIS — I77.0 ARTERIOVENOUS FISTULA, ACQUIRED: Chronic | ICD-10-CM

## 2025-02-04 DIAGNOSIS — Z90.79 ACQUIRED ABSENCE OF OTHER GENITAL ORGAN(S): Chronic | ICD-10-CM

## 2025-02-04 DIAGNOSIS — Z79.2 LONG TERM (CURRENT) USE OF ANTIBIOTICS: ICD-10-CM

## 2025-02-04 DIAGNOSIS — Z96.652 PRESENCE OF LEFT ARTIFICIAL KNEE JOINT: Chronic | ICD-10-CM

## 2025-02-04 DIAGNOSIS — Z94.0 KIDNEY TRANSPLANT STATUS: Chronic | ICD-10-CM

## 2025-02-04 LAB
ANION GAP SERPL CALC-SCNC: 12 MMOL/L — SIGNIFICANT CHANGE UP (ref 7–14)
APTT BLD: 31.9 SEC — SIGNIFICANT CHANGE UP (ref 24.5–35.6)
BLD GP AB SCN SERPL QL: NEGATIVE — SIGNIFICANT CHANGE UP
BUN SERPL-MCNC: 33 MG/DL — HIGH (ref 7–23)
CALCIUM SERPL-MCNC: 9.3 MG/DL — SIGNIFICANT CHANGE UP (ref 8.4–10.5)
CHLORIDE SERPL-SCNC: 103 MMOL/L — SIGNIFICANT CHANGE UP (ref 98–107)
CO2 SERPL-SCNC: 24 MMOL/L — SIGNIFICANT CHANGE UP (ref 22–31)
CREAT SERPL-MCNC: 3.71 MG/DL — HIGH (ref 0.5–1.3)
EGFR: 16 ML/MIN/1.73M2 — LOW
GLUCOSE BLDC GLUCOMTR-MCNC: 246 MG/DL — HIGH (ref 70–99)
GLUCOSE BLDC GLUCOMTR-MCNC: 252 MG/DL — HIGH (ref 70–99)
GLUCOSE BLDC GLUCOMTR-MCNC: 99 MG/DL — SIGNIFICANT CHANGE UP (ref 70–99)
GLUCOSE SERPL-MCNC: 139 MG/DL — HIGH (ref 70–99)
HCT VFR BLD CALC: 31 % — LOW (ref 39–50)
HGB BLD-MCNC: 10 G/DL — LOW (ref 13–17)
INR BLD: 0.91 RATIO — SIGNIFICANT CHANGE UP (ref 0.85–1.16)
MCHC RBC-ENTMCNC: 29.2 PG — SIGNIFICANT CHANGE UP (ref 27–34)
MCHC RBC-ENTMCNC: 32.3 G/DL — SIGNIFICANT CHANGE UP (ref 32–36)
MCV RBC AUTO: 90.4 FL — SIGNIFICANT CHANGE UP (ref 80–100)
NRBC # BLD AUTO: 0 K/UL — SIGNIFICANT CHANGE UP (ref 0–0)
NRBC # BLD: 0 /100 WBCS — SIGNIFICANT CHANGE UP (ref 0–0)
NRBC # FLD: 0 K/UL — SIGNIFICANT CHANGE UP (ref 0–0)
NRBC BLD-RTO: 0 /100 WBCS — SIGNIFICANT CHANGE UP (ref 0–0)
PLATELET # BLD AUTO: 163 K/UL — SIGNIFICANT CHANGE UP (ref 150–400)
POTASSIUM SERPL-MCNC: 5.3 MMOL/L — SIGNIFICANT CHANGE UP (ref 3.5–5.3)
POTASSIUM SERPL-SCNC: 5.3 MMOL/L — SIGNIFICANT CHANGE UP (ref 3.5–5.3)
PROTHROM AB SERPL-ACNC: 10.6 SEC — SIGNIFICANT CHANGE UP (ref 9.9–13.4)
RBC # BLD: 3.43 M/UL — LOW (ref 4.2–5.8)
RBC # FLD: 14.3 % — SIGNIFICANT CHANGE UP (ref 10.3–14.5)
RH IG SCN BLD-IMP: POSITIVE — SIGNIFICANT CHANGE UP
SODIUM SERPL-SCNC: 139 MMOL/L — SIGNIFICANT CHANGE UP (ref 135–145)
WBC # BLD: 7.33 K/UL — SIGNIFICANT CHANGE UP (ref 3.8–10.5)
WBC # FLD AUTO: 7.33 K/UL — SIGNIFICANT CHANGE UP (ref 3.8–10.5)

## 2025-02-04 RX ORDER — DM/PSEUDOEPHED/ACETAMINOPHEN 10-30-250
12.5 CAPSULE ORAL ONCE
Refills: 0 | Status: DISCONTINUED | OUTPATIENT
Start: 2025-02-04 | End: 2025-02-04

## 2025-02-04 RX ORDER — SODIUM CHLORIDE 9 G/ML
1000 INJECTION, SOLUTION INTRAVENOUS
Refills: 0 | Status: DISCONTINUED | OUTPATIENT
Start: 2025-02-04 | End: 2025-02-05

## 2025-02-04 RX ORDER — METOPROLOL SUCCINATE 25 MG
50 TABLET, EXTENDED RELEASE 24 HR ORAL DAILY
Refills: 0 | Status: DISCONTINUED | OUTPATIENT
Start: 2025-02-04 | End: 2025-02-06

## 2025-02-04 RX ORDER — PANTOPRAZOLE 20 MG/1
40 TABLET, DELAYED RELEASE ORAL
Refills: 0 | Status: DISCONTINUED | OUTPATIENT
Start: 2025-02-04 | End: 2025-02-06

## 2025-02-04 RX ORDER — DM/PSEUDOEPHED/ACETAMINOPHEN 10-30-250
25 CAPSULE ORAL ONCE
Refills: 0 | Status: DISCONTINUED | OUTPATIENT
Start: 2025-02-04 | End: 2025-02-04

## 2025-02-04 RX ORDER — VANCOMYCIN HYDROCHLORIDE 50 MG/ML
1250 KIT ORAL ONCE
Refills: 0 | Status: COMPLETED | OUTPATIENT
Start: 2025-02-04 | End: 2025-02-04

## 2025-02-04 RX ORDER — GLUCAGON 3 MG/1
1 POWDER NASAL ONCE
Refills: 0 | Status: DISCONTINUED | OUTPATIENT
Start: 2025-02-04 | End: 2025-02-04

## 2025-02-04 RX ORDER — SODIUM CHLORIDE 9 G/ML
1000 INJECTION, SOLUTION INTRAVENOUS
Refills: 0 | Status: DISCONTINUED | OUTPATIENT
Start: 2025-02-04 | End: 2025-02-04

## 2025-02-04 RX ORDER — GLUCAGON 3 MG/1
1 POWDER NASAL ONCE
Refills: 0 | Status: DISCONTINUED | OUTPATIENT
Start: 2025-02-04 | End: 2025-02-05

## 2025-02-04 RX ORDER — ACETAMINOPHEN 160 MG/5ML
975 SUSPENSION ORAL EVERY 6 HOURS
Refills: 0 | Status: DISCONTINUED | OUTPATIENT
Start: 2025-02-04 | End: 2025-02-06

## 2025-02-04 RX ORDER — BISACODYL 5 MG
5 TABLET, DELAYED RELEASE (ENTERIC COATED) ORAL EVERY 12 HOURS
Refills: 0 | Status: DISCONTINUED | OUTPATIENT
Start: 2025-02-04 | End: 2025-02-06

## 2025-02-04 RX ORDER — ATORVASTATIN CALCIUM 80 MG/1
10 TABLET, FILM COATED ORAL AT BEDTIME
Refills: 0 | Status: DISCONTINUED | OUTPATIENT
Start: 2025-02-04 | End: 2025-02-06

## 2025-02-04 RX ORDER — DM/PSEUDOEPHED/ACETAMINOPHEN 10-30-250
15 CAPSULE ORAL ONCE
Refills: 0 | Status: DISCONTINUED | OUTPATIENT
Start: 2025-02-04 | End: 2025-02-05

## 2025-02-04 RX ORDER — INSULIN LISPRO 100/ML
VIAL (ML) SUBCUTANEOUS AT BEDTIME
Refills: 0 | Status: DISCONTINUED | OUTPATIENT
Start: 2025-02-04 | End: 2025-02-04

## 2025-02-04 RX ORDER — SENNOSIDES 8.6 MG
2 TABLET ORAL AT BEDTIME
Refills: 0 | Status: DISCONTINUED | OUTPATIENT
Start: 2025-02-04 | End: 2025-02-06

## 2025-02-04 RX ORDER — DM/PSEUDOEPHED/ACETAMINOPHEN 10-30-250
12.5 CAPSULE ORAL ONCE
Refills: 0 | Status: DISCONTINUED | OUTPATIENT
Start: 2025-02-04 | End: 2025-02-05

## 2025-02-04 RX ORDER — INSULIN LISPRO 100/ML
VIAL (ML) SUBCUTANEOUS
Refills: 0 | Status: DISCONTINUED | OUTPATIENT
Start: 2025-02-04 | End: 2025-02-04

## 2025-02-04 RX ORDER — INSULIN LISPRO 100/ML
VIAL (ML) SUBCUTANEOUS EVERY 6 HOURS
Refills: 0 | Status: DISCONTINUED | OUTPATIENT
Start: 2025-02-04 | End: 2025-02-05

## 2025-02-04 RX ORDER — TACROLIMUS 1 MG/1
3 CAPSULE, GELATIN COATED ORAL DAILY
Refills: 0 | Status: DISCONTINUED | OUTPATIENT
Start: 2025-02-04 | End: 2025-02-06

## 2025-02-04 RX ORDER — DM/PSEUDOEPHED/ACETAMINOPHEN 10-30-250
25 CAPSULE ORAL ONCE
Refills: 0 | Status: DISCONTINUED | OUTPATIENT
Start: 2025-02-04 | End: 2025-02-05

## 2025-02-04 RX ORDER — DM/PSEUDOEPHED/ACETAMINOPHEN 10-30-250
15 CAPSULE ORAL ONCE
Refills: 0 | Status: DISCONTINUED | OUTPATIENT
Start: 2025-02-04 | End: 2025-02-04

## 2025-02-04 RX ORDER — PREDNISONE 5 MG/1
5 TABLET ORAL DAILY
Refills: 0 | Status: DISCONTINUED | OUTPATIENT
Start: 2025-02-04 | End: 2025-02-06

## 2025-02-04 RX ORDER — NIFEDIPINE 90 MG/1
60 TABLET, FILM COATED, EXTENDED RELEASE ORAL DAILY
Refills: 0 | Status: DISCONTINUED | OUTPATIENT
Start: 2025-02-04 | End: 2025-02-06

## 2025-02-04 RX ORDER — MYCOPHENOLATE MOFETIL 200 MG/ML
500 POWDER, FOR SUSPENSION ORAL
Refills: 0 | Status: DISCONTINUED | OUTPATIENT
Start: 2025-02-04 | End: 2025-02-06

## 2025-02-04 RX ORDER — ASPIRIN 81 MG/1
81 TABLET, COATED ORAL DAILY
Refills: 0 | Status: DISCONTINUED | OUTPATIENT
Start: 2025-02-04 | End: 2025-02-06

## 2025-02-04 RX ADMIN — VANCOMYCIN HYDROCHLORIDE 166.67 MILLIGRAM(S): KIT at 19:01

## 2025-02-04 RX ADMIN — ATORVASTATIN CALCIUM 10 MILLIGRAM(S): 80 TABLET, FILM COATED ORAL at 22:25

## 2025-02-04 RX ADMIN — MYCOPHENOLATE MOFETIL 500 MILLIGRAM(S): 200 POWDER, FOR SUSPENSION ORAL at 19:02

## 2025-02-04 RX ADMIN — Medication 2: at 17:12

## 2025-02-04 NOTE — PATIENT PROFILE ADULT - FALL HARM RISK - HARM RISK INTERVENTIONS

## 2025-02-04 NOTE — PATIENT PROFILE ADULT - BILL PAYMENT
"Chief Complaint   Patient presents with     Well Child       Initial /65  Pulse 60  Ht 4' 7.5\" (1.41 m)  Wt 67 lb (30.4 kg)  BMI 15.29 kg/m2 Estimated body mass index is 15.29 kg/(m^2) as calculated from the following:    Height as of this encounter: 4' 7.5\" (1.41 m).    Weight as of this encounter: 67 lb (30.4 kg).  Medication Reconciliation: complete   Otilia Schwartz CMA    Injectable Influenza Immunization Documentation  1.  Is the person to be vaccinated sick today?  No  2. Does the person to be vaccinated have an allergy to eggs or to a component of the vaccine?  No  3. Has the person to be vaccinated today ever had a serious reaction to influenza vaccine in the past?  No  4. Has the person to be vaccinated ever had Guillain-Richmond syndrome?  No  Form completed by Maury Her's mom  "
no

## 2025-02-05 ENCOUNTER — TRANSCRIPTION ENCOUNTER (OUTPATIENT)
Age: 75
End: 2025-02-05

## 2025-02-05 ENCOUNTER — APPOINTMENT (OUTPATIENT)
Dept: UROLOGY | Facility: HOSPITAL | Age: 75
End: 2025-02-05

## 2025-02-05 LAB
A1C WITH ESTIMATED AVERAGE GLUCOSE RESULT: 6.7 % — HIGH (ref 4–5.6)
ESTIMATED AVERAGE GLUCOSE: 146 — SIGNIFICANT CHANGE UP
GLUCOSE BLDC GLUCOMTR-MCNC: 104 MG/DL — HIGH (ref 70–99)
GLUCOSE BLDC GLUCOMTR-MCNC: 118 MG/DL — HIGH (ref 70–99)
GLUCOSE BLDC GLUCOMTR-MCNC: 129 MG/DL — HIGH (ref 70–99)
GLUCOSE BLDC GLUCOMTR-MCNC: 165 MG/DL — HIGH (ref 70–99)
GLUCOSE BLDC GLUCOMTR-MCNC: 68 MG/DL — LOW (ref 70–99)
GLUCOSE BLDC GLUCOMTR-MCNC: 74 MG/DL — SIGNIFICANT CHANGE UP (ref 70–99)
GLUCOSE BLDC GLUCOMTR-MCNC: 76 MG/DL — SIGNIFICANT CHANGE UP (ref 70–99)
GLUCOSE BLDC GLUCOMTR-MCNC: 82 MG/DL — SIGNIFICANT CHANGE UP (ref 70–99)
GLUCOSE BLDC GLUCOMTR-MCNC: 95 MG/DL — SIGNIFICANT CHANGE UP (ref 70–99)
RH IG SCN BLD-IMP: POSITIVE — SIGNIFICANT CHANGE UP
VANCOMYCIN TROUGH SERPL-MCNC: 7.3 UG/ML — LOW (ref 10–20)

## 2025-02-05 PROCEDURE — 52332 CYSTOSCOPY AND TREATMENT: CPT | Mod: RT

## 2025-02-05 PROCEDURE — 99223 1ST HOSP IP/OBS HIGH 75: CPT

## 2025-02-05 RX ORDER — DM/PSEUDOEPHED/ACETAMINOPHEN 10-30-250
25 CAPSULE ORAL ONCE
Refills: 0 | Status: DISCONTINUED | OUTPATIENT
Start: 2025-02-05 | End: 2025-02-06

## 2025-02-05 RX ORDER — INSULIN LISPRO 100/ML
VIAL (ML) SUBCUTANEOUS AT BEDTIME
Refills: 0 | Status: DISCONTINUED | OUTPATIENT
Start: 2025-02-05 | End: 2025-02-06

## 2025-02-05 RX ORDER — FENTANYL CITRATE 50 UG/ML
25 INJECTION INTRAMUSCULAR; INTRAVENOUS
Refills: 0 | Status: DISCONTINUED | OUTPATIENT
Start: 2025-02-05 | End: 2025-02-05

## 2025-02-05 RX ORDER — SODIUM CHLORIDE 9 G/ML
1000 INJECTION, SOLUTION INTRAVENOUS
Refills: 0 | Status: DISCONTINUED | OUTPATIENT
Start: 2025-02-05 | End: 2025-02-06

## 2025-02-05 RX ORDER — FENTANYL CITRATE 50 UG/ML
50 INJECTION INTRAMUSCULAR; INTRAVENOUS
Refills: 0 | Status: DISCONTINUED | OUTPATIENT
Start: 2025-02-05 | End: 2025-02-05

## 2025-02-05 RX ORDER — ONDANSETRON 4 MG/1
4 TABLET, ORALLY DISINTEGRATING ORAL ONCE
Refills: 0 | Status: DISCONTINUED | OUTPATIENT
Start: 2025-02-05 | End: 2025-02-05

## 2025-02-05 RX ORDER — VANCOMYCIN HYDROCHLORIDE 50 MG/ML
1250 KIT ORAL ONCE
Refills: 0 | Status: COMPLETED | OUTPATIENT
Start: 2025-02-05 | End: 2025-02-05

## 2025-02-05 RX ORDER — DM/PSEUDOEPHED/ACETAMINOPHEN 10-30-250
15 CAPSULE ORAL ONCE
Refills: 0 | Status: DISCONTINUED | OUTPATIENT
Start: 2025-02-05 | End: 2025-02-06

## 2025-02-05 RX ORDER — INSULIN LISPRO 100/ML
VIAL (ML) SUBCUTANEOUS
Refills: 0 | Status: DISCONTINUED | OUTPATIENT
Start: 2025-02-05 | End: 2025-02-06

## 2025-02-05 RX ORDER — DM/PSEUDOEPHED/ACETAMINOPHEN 10-30-250
12.5 CAPSULE ORAL ONCE
Refills: 0 | Status: DISCONTINUED | OUTPATIENT
Start: 2025-02-05 | End: 2025-02-06

## 2025-02-05 RX ORDER — GLUCAGON 3 MG/1
1 POWDER NASAL ONCE
Refills: 0 | Status: DISCONTINUED | OUTPATIENT
Start: 2025-02-05 | End: 2025-02-06

## 2025-02-05 RX ORDER — CEFTRIAXONE 250 MG/1
1000 INJECTION, POWDER, FOR SOLUTION INTRAMUSCULAR; INTRAVENOUS EVERY 24 HOURS
Refills: 0 | Status: DISCONTINUED | OUTPATIENT
Start: 2025-02-05 | End: 2025-02-06

## 2025-02-05 RX ADMIN — ATORVASTATIN CALCIUM 10 MILLIGRAM(S): 80 TABLET, FILM COATED ORAL at 22:11

## 2025-02-05 RX ADMIN — MYCOPHENOLATE MOFETIL 500 MILLIGRAM(S): 200 POWDER, FOR SUSPENSION ORAL at 18:21

## 2025-02-05 RX ADMIN — TACROLIMUS 3 MILLIGRAM(S): 1 CAPSULE, GELATIN COATED ORAL at 07:35

## 2025-02-05 RX ADMIN — NIFEDIPINE 60 MILLIGRAM(S): 90 TABLET, FILM COATED, EXTENDED RELEASE ORAL at 06:36

## 2025-02-05 RX ADMIN — PREDNISONE 5 MILLIGRAM(S): 5 TABLET ORAL at 06:36

## 2025-02-05 RX ADMIN — SODIUM CHLORIDE 50 MILLILITER(S): 9 INJECTION, SOLUTION INTRAVENOUS at 02:04

## 2025-02-05 RX ADMIN — ASPIRIN 81 MILLIGRAM(S): 81 TABLET, COATED ORAL at 14:59

## 2025-02-05 RX ADMIN — CEFTRIAXONE 100 MILLIGRAM(S): 250 INJECTION, POWDER, FOR SOLUTION INTRAMUSCULAR; INTRAVENOUS at 14:59

## 2025-02-05 RX ADMIN — SODIUM CHLORIDE 50 MILLILITER(S): 9 INJECTION, SOLUTION INTRAVENOUS at 00:06

## 2025-02-05 RX ADMIN — Medication 1: at 17:34

## 2025-02-05 RX ADMIN — MYCOPHENOLATE MOFETIL 500 MILLIGRAM(S): 200 POWDER, FOR SUSPENSION ORAL at 06:36

## 2025-02-05 RX ADMIN — VANCOMYCIN HYDROCHLORIDE 166.67 MILLIGRAM(S): KIT at 22:10

## 2025-02-05 RX ADMIN — PANTOPRAZOLE 40 MILLIGRAM(S): 20 TABLET, DELAYED RELEASE ORAL at 06:35

## 2025-02-05 RX ADMIN — Medication 50 MILLIGRAM(S): at 06:36

## 2025-02-05 NOTE — CONSULT NOTE ADULT - ASSESSMENT
74 year old male with a history of ESRD s/p renal transplant in 2018, prostate cancer s/p RT, RCC s/p nephrectomy, Hepatitis C s/p treatment, HTN, Type 2 DM presenting for positive urine culture, planned for cystoscopy and R ureteral stent exchange on 2/5.    #Enterococcus faecium UTI  -urine culture from 1/30 with >100k CFU enterococcus faecium  -c/w vancomycin by level  -last dose of vancomycin at 7 PM on 2/4- agree with checking level at 6 PM today    #Renal transplant recipient  -c/w home meds prednisone 5mg qd, tacrolimus 3mg qd, Cellcept 500mg PO BID  -check tacrolimus level prior to next dose  -s/p R ureteral stent exchange on 2/5    #CKD Stage 4  -Cr at baseline appears to be around 4  -avoid nephrotoxic agents, renally dose meds    #HTN  -controlled  -c/w nifedipine and metoprolol succinate    #Type 2 DM  -A1c 6.7%  -low dose ISS, monitor FS qac and qHS 74 year old male with a history of ESRD s/p renal transplant in 2018, prostate cancer s/p RT, RCC s/p nephrectomy, Hepatitis C s/p treatment, HTN, Type 2 DM presenting for positive urine culture, planned for cystoscopy and R ureteral stent exchange on 2/5.    #Enterococcus faecium UTI  -urine culture from 1/30 with >100k CFU enterococcus faecium  -c/w vancomycin by level  -last dose of vancomycin at 7 PM on 2/4- agree with checking level at 6 PM today    #Renal transplant recipient  -c/w home meds prednisone 5mg qd, tacrolimus 3mg qd, Cellcept 500mg PO BID  -check tacrolimus level prior to next dose  -s/p R ureteral stent exchange on 2/5    #CKD Stage 4  -Cr at baseline appears to be around 4  -avoid nephrotoxic agents, renally dose meds    #LUE swelling  -fistulogram recently done, vascular surgeon planning for LUE venogram and possible angioplasty outpatient    #HTN  -controlled  -c/w nifedipine and metoprolol succinate    #Type 2 DM  -A1c 6.7%  -low dose ISS, monitor FS qac and qHS

## 2025-02-05 NOTE — PRE-OP CHECKLIST - 1.
vitals in asu at 11:15 am- 154/73, rr- 178, sat on room air- 98%, hr- 79, temp axillary-97.5 vitals in asu at 11:15 am- 154/73, rr- 17, sat on room air- 98%, hr- 79, temp axillary-97.5

## 2025-02-05 NOTE — CONSULT NOTE ADULT - SUBJECTIVE AND OBJECTIVE BOX
Patient to be seen CHIEF COMPLAINT: Patient is a 74y old  Male who presents with a chief complaint of Cystoscopy, R ureteral stent exchange (2025 14:46)    Patient seen and examined at bedside s/p procedure. Pt feels well, is having some mild hematuria. Reports LUE swelling for the past 2.5 months, but denies pain. Says he has numbness/tingling in his left arm at baseline. Oliguric at baseline.    Allergies    No Known Allergies    Intolerances        HOME MEDICATIONS: [x] Reviewed    MEDICATIONS  (STANDING):  aspirin  chewable 81 milliGRAM(s) Oral daily  atorvastatin 10 milliGRAM(s) Oral at bedtime  cefTRIAXone   IVPB 1000 milliGRAM(s) IV Intermittent every 24 hours  dextrose 5% + sodium chloride 0.45%. 1000 milliLiter(s) (50 mL/Hr) IV Continuous <Continuous>  dextrose 5%. 1000 milliLiter(s) (100 mL/Hr) IV Continuous <Continuous>  dextrose 5%. 1000 milliLiter(s) (50 mL/Hr) IV Continuous <Continuous>  dextrose 50% Injectable 25 Gram(s) IV Push once  dextrose 50% Injectable 12.5 Gram(s) IV Push once  dextrose 50% Injectable 25 Gram(s) IV Push once  glucagon  Injectable 1 milliGRAM(s) IntraMuscular once  insulin lispro (ADMELOG) corrective regimen sliding scale   SubCutaneous three times a day before meals  insulin lispro (ADMELOG) corrective regimen sliding scale   SubCutaneous at bedtime  metoprolol succinate ER 50 milliGRAM(s) Oral daily  mycophenolate mofetil 500 milliGRAM(s) Oral two times a day  NIFEdipine XL 60 milliGRAM(s) Oral daily  pantoprazole    Tablet 40 milliGRAM(s) Oral before breakfast  predniSONE   Tablet 5 milliGRAM(s) Oral daily  tacrolimus ER Tablet (ENVARSUS XR) 3 milliGRAM(s) Oral daily    MEDICATIONS  (PRN):  acetaminophen     Tablet .. 975 milliGRAM(s) Oral every 6 hours PRN Moderate Pain (4 - 6)  bisacodyl 5 milliGRAM(s) Oral every 12 hours PRN Constipation  dextrose Oral Gel 15 Gram(s) Oral once PRN Blood Glucose LESS THAN 70 milliGRAM(s)/deciliter  senna 2 Tablet(s) Oral at bedtime PRN Constipation      PAST MEDICAL & SURGICAL HISTORY:  HTN (hypertension)      Type 2 diabetes mellitus  dx:       ESRD (end stage renal disease)  On Dialysis '  to 2018      Hepatitis C  In Donor Kidney: patient received treatment for Hep. C : post treatment: patient  tested Negative for Hep C      Benign prostatic hypertrophy      Anal fissure  dx: 2018   No surgery      Bowel obstruction  '    surgically treated w/ ileostomy; since reversed      Medial meniscus tear    Right      Renal cell carcinoma  s/p b/l nephrectomy and renal transplant in       Prostate cancer  s/p RT      Ureteral stricture of kidney transplant        COVID-19 virus infection  2021, asymptomatic      Bilateral cataracts      Anemia secondary to renal failure      Urine retention      Other complication of kidney transplant      OA (osteoarthritis)      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 s/p Reversal      S/P right knee arthroscopy        Kidney transplant recipient  2018  @ [facility] :  +  Hep C Donor      H/O colonoscopy      S/P TURP (transurethral resection of prostate)  and Transplant Kidney Stent Replacemernt, 21      S/P anal fissurectomy  and chemical denervation  with biopsy and fulguration of anal lesions, 2021      Anal condyloma  S/P excision; 22      S/P total knee replacement, left      [x] Reviewed     SOCIAL HISTORY:  [ ] Substance abuse:   [ ] Alcohol use:   [ ] Tobacco:     FAMILY HISTORY:  FH: breast cancer (Mother)    FH: type 2 diabetes (Father)    FH: heart attack (Sibling)  age 54 at death    REVIEW OF SYSTEMS:  [x] All other ROS negative  [  ] Unable to obtain due to poor mental status    Vital Signs Last 24 Hrs  T(C): 36.6 (2025 14:24), Max: 36.7 (2025 17:37)  T(F): 97.8 (2025 14:24), Max: 98.1 (2025 17:37)  HR: 68 (2025 14:24) (63 - 79)  BP: 133/66 (2025 14:24) (125/65 - 155/71)  BP(mean): 90 (2025 13:45) (88 - 99)  RR: 16 (2025 14:24) (15 - 19)  SpO2: 100% (2025 14:24) (96% - 100%)    Parameters below as of 2025 13:00  Patient On (Oxygen Delivery Method): room air      PHYSICAL EXAM:  GENERAL: NAD, well-groomed, well-developed  EYES: EOMI, conjunctiva and sclera clear  ENMT: Moist mucous membranes  RESPIRATORY: Clear to auscultation bilaterally; No rales, rhonchi, wheezing, or rubs  CARDIOVASCULAR: Regular rate and rhythm; No murmurs, rubs, or gallops  GASTROINTESTINAL: Soft, Nontender, Nondistended; Bowel sounds present  GENITOURINARY: Not examined  EXTREMITIES: LUE pitting edema from hand up to shoulder; warm, well perfused  NERVOUS SYSTEM:  Alert & Oriented X3; Moving all 4 extremities  SKIN: No rashes or lesions    LABS:                        10.0   7.33  )-----------( 163      ( 2025 19:32 )             31.0     Hemoglobin: 10.0 g/dL (- @ 19:32)    -    139  |  103  |  33[H]  ----------------------------<  139[H]  5.3   |  24  |  3.71[H]    Ca    9.3      2025 19:15      PT/INR - ( 2025 19:15 )   PT: 10.6 sec;   INR: 0.91 ratio         PTT - ( 2025 19:15 )  PTT:31.9 sec  Urinalysis Basic - ( 2025 19:15 )    Color: x / Appearance: x / SG: x / pH: x  Gluc: 139 mg/dL / Ketone: x  / Bili: x / Urobili: x   Blood: x / Protein: x / Nitrite: x   Leuk Esterase: x / RBC: x / WBC x   Sq Epi: x / Non Sq Epi: x / Bacteria: x      Microbiology     RADIOLOGY & ADDITIONAL STUDIES:    EKG:   Personally Reviewed:  [x] YES     Imaging:   Personally Reviewed:  [x] YES

## 2025-02-05 NOTE — PROVIDER CONTACT NOTE (OTHER) - SITUATION
Pt was NPO after midnight for surgery scheduled tomorrow. Finger stick at 0200 was 68 mg/dL. Repeat fingerstick at 0202 was 76 mg/dL.

## 2025-02-06 ENCOUNTER — TRANSCRIPTION ENCOUNTER (OUTPATIENT)
Age: 75
End: 2025-02-06

## 2025-02-06 VITALS
HEART RATE: 77 BPM | TEMPERATURE: 98 F | OXYGEN SATURATION: 99 % | SYSTOLIC BLOOD PRESSURE: 136 MMHG | RESPIRATION RATE: 18 BRPM | DIASTOLIC BLOOD PRESSURE: 60 MMHG

## 2025-02-06 LAB
ANION GAP SERPL CALC-SCNC: 8 MMOL/L — SIGNIFICANT CHANGE UP (ref 7–14)
BUN SERPL-MCNC: 35 MG/DL — HIGH (ref 7–23)
CALCIUM SERPL-MCNC: 8.9 MG/DL — SIGNIFICANT CHANGE UP (ref 8.4–10.5)
CHLORIDE SERPL-SCNC: 105 MMOL/L — SIGNIFICANT CHANGE UP (ref 98–107)
CO2 SERPL-SCNC: 24 MMOL/L — SIGNIFICANT CHANGE UP (ref 22–31)
CREAT SERPL-MCNC: 3.72 MG/DL — HIGH (ref 0.5–1.3)
EGFR: 16 ML/MIN/1.73M2 — LOW
GLUCOSE BLDC GLUCOMTR-MCNC: 151 MG/DL — HIGH (ref 70–99)
GLUCOSE BLDC GLUCOMTR-MCNC: 89 MG/DL — SIGNIFICANT CHANGE UP (ref 70–99)
GLUCOSE SERPL-MCNC: 87 MG/DL — SIGNIFICANT CHANGE UP (ref 70–99)
POTASSIUM SERPL-MCNC: 5 MMOL/L — SIGNIFICANT CHANGE UP (ref 3.5–5.3)
POTASSIUM SERPL-SCNC: 5 MMOL/L — SIGNIFICANT CHANGE UP (ref 3.5–5.3)
SODIUM SERPL-SCNC: 137 MMOL/L — SIGNIFICANT CHANGE UP (ref 135–145)

## 2025-02-06 PROCEDURE — 99232 SBSQ HOSP IP/OBS MODERATE 35: CPT

## 2025-02-06 RX ORDER — SULFAMETHOXAZOLE AND TRIMETHOPRIM 400; 80 MG/1; MG/1
1 TABLET ORAL
Refills: 0 | DISCHARGE

## 2025-02-06 RX ORDER — ACETAMINOPHEN 160 MG/5ML
3 SUSPENSION ORAL
Qty: 0 | Refills: 0 | DISCHARGE
Start: 2025-02-06

## 2025-02-06 RX ADMIN — MYCOPHENOLATE MOFETIL 500 MILLIGRAM(S): 200 POWDER, FOR SUSPENSION ORAL at 06:23

## 2025-02-06 RX ADMIN — CEFTRIAXONE 100 MILLIGRAM(S): 250 INJECTION, POWDER, FOR SOLUTION INTRAMUSCULAR; INTRAVENOUS at 11:46

## 2025-02-06 RX ADMIN — Medication 50 MILLIGRAM(S): at 06:23

## 2025-02-06 RX ADMIN — Medication 1: at 11:41

## 2025-02-06 RX ADMIN — ASPIRIN 81 MILLIGRAM(S): 81 TABLET, COATED ORAL at 11:42

## 2025-02-06 RX ADMIN — TACROLIMUS 3 MILLIGRAM(S): 1 CAPSULE, GELATIN COATED ORAL at 07:33

## 2025-02-06 RX ADMIN — PREDNISONE 5 MILLIGRAM(S): 5 TABLET ORAL at 06:23

## 2025-02-06 RX ADMIN — NIFEDIPINE 60 MILLIGRAM(S): 90 TABLET, FILM COATED, EXTENDED RELEASE ORAL at 06:23

## 2025-02-06 NOTE — DISCHARGE NOTE NURSING/CASE MANAGEMENT/SOCIAL WORK - PATIENT PORTAL LINK FT
You can access the FollowMyHealth Patient Portal offered by [facility] by registering at the following website: [email or web address]. By joining [facility] FollowMyHealth portal, you will also be able to view your health information using other applications (apps) compatible with our system.

## 2025-02-06 NOTE — DISCHARGE NOTE PROVIDER - HOSPITAL COURSE
75 yo M h/o HTN, T2DM, Hep C, ESRD and kidney, renal cancer, prostate cancer, kidney transplant recipient  in 2018.  He had developed urinary retention s/p transurethral resection of the prostate. He had developed obstruction of the transplant ureter requiring treatment with the stent. The stent was last changed in August 2023. He is due for a cystoscopy and exchange of ureteral stent, pt preadmitted 2/4/2025 for IV abx secondary to Ucx E faecium, started on vanco by level. Pt oliguric at baseline, chronic LLE edema, doppler neg 12/2024, fistulogram 1/30 was performed and showed patent AVF with no evidence of stenosis in central vein.    2/5: pt offers no complaints, remained afebrile, for OR today, given vanco (by level), CTX  2/6: pt underwent uncomplicated right ureteral stent exchange in transplant kidney yesterday, remains afebrile, tolerating diet, voiding well, one additional dose of CTX today and d/c no abx to f/u with Dr. Phillip

## 2025-02-06 NOTE — PROGRESS NOTE ADULT - SUBJECTIVE AND OBJECTIVE BOX
Note    Post op Check    s/p Cystoscopy, R ureteral stent exchange.   EBL 0ml    Patient seen and examined without complaints, pain controlled, voiding without difficulty with yellow urine.     Vital Signs Last 24 Hrs  T(C): 36.6 (05 Feb 2025 18:00), Max: 36.7 (04 Feb 2025 22:14)  T(F): 97.9 (05 Feb 2025 18:00), Max: 98 (04 Feb 2025 22:14)  HR: 71 (05 Feb 2025 18:00) (63 - 79)  BP: 133/63 (05 Feb 2025 18:00) (128/81 - 155/71)  BP(mean): 90 (05 Feb 2025 13:45) (88 - 99)  RR: 17 (05 Feb 2025 18:00) (15 - 19)  SpO2: 100% (05 Feb 2025 18:00) (96% - 100%)    Parameters below as of 05 Feb 2025 18:00  Patient On (Oxygen Delivery Method): room air    I&O's Summary    04 Feb 2025 07:01  -  05 Feb 2025 07:00  --------------------------------------------------------  IN: 300 mL / OUT: 250 mL / NET: 50 mL    05 Feb 2025 07:01  -  05 Feb 2025 20:48  --------------------------------------------------------  IN: 300 mL / OUT: 100 mL / NET: 200 mL    PHYSICAL EXAM:  Constitutional: well appearing, sitting up in bed comfortably    Respiratory: clear     Cardiovascular: regular     Gastrointestinal: soft, no tenderness    Genitourinary: no tenderness    Extremities: venodynes in place   
[facility] Medicine  Available on [employer] Teams    Patient is a 74y old  Male who presents with a chief complaint of IV antibiotics (06 Feb 2025 11:54)      SUBJECTIVE / OVERNIGHT EVENTS: Patient seen and examined at bedside. Pt feels well, ready to go home today.    ADDITIONAL REVIEW OF SYSTEMS:    MEDICATIONS  (STANDING):  aspirin  chewable 81 milliGRAM(s) Oral daily  atorvastatin 10 milliGRAM(s) Oral at bedtime  cefTRIAXone   IVPB 1000 milliGRAM(s) IV Intermittent every 24 hours  dextrose 5% + sodium chloride 0.45%. 1000 milliLiter(s) (50 mL/Hr) IV Continuous <Continuous>  dextrose 5%. 1000 milliLiter(s) (100 mL/Hr) IV Continuous <Continuous>  dextrose 5%. 1000 milliLiter(s) (50 mL/Hr) IV Continuous <Continuous>  dextrose 50% Injectable 25 Gram(s) IV Push once  dextrose 50% Injectable 12.5 Gram(s) IV Push once  dextrose 50% Injectable 25 Gram(s) IV Push once  glucagon  Injectable 1 milliGRAM(s) IntraMuscular once  insulin lispro (ADMELOG) corrective regimen sliding scale   SubCutaneous three times a day before meals  insulin lispro (ADMELOG) corrective regimen sliding scale   SubCutaneous at bedtime  metoprolol succinate ER 50 milliGRAM(s) Oral daily  mycophenolate mofetil 500 milliGRAM(s) Oral two times a day  NIFEdipine XL 60 milliGRAM(s) Oral daily  pantoprazole    Tablet 40 milliGRAM(s) Oral before breakfast  predniSONE   Tablet 5 milliGRAM(s) Oral daily  tacrolimus ER Tablet (ENVARSUS XR) 3 milliGRAM(s) Oral daily    MEDICATIONS  (PRN):  acetaminophen     Tablet .. 975 milliGRAM(s) Oral every 6 hours PRN Moderate Pain (4 - 6)  bisacodyl 5 milliGRAM(s) Oral every 12 hours PRN Constipation  dextrose Oral Gel 15 Gram(s) Oral once PRN Blood Glucose LESS THAN 70 milliGRAM(s)/deciliter  senna 2 Tablet(s) Oral at bedtime PRN Constipation      CAPILLARY BLOOD GLUCOSE      POCT Blood Glucose.: 151 mg/dL (06 Feb 2025 11:28)  POCT Blood Glucose.: 89 mg/dL (06 Feb 2025 07:17)  POCT Blood Glucose.: 95 mg/dL (05 Feb 2025 22:41)  POCT Blood Glucose.: 165 mg/dL (05 Feb 2025 16:59)    I&O's Summary    05 Feb 2025 07:01  -  06 Feb 2025 07:00  --------------------------------------------------------  IN: 300 mL / OUT: 1325 mL / NET: -1025 mL    06 Feb 2025 07:01  -  06 Feb 2025 13:57  --------------------------------------------------------  IN: 0 mL / OUT: 450 mL / NET: -450 mL        PHYSICAL EXAM:    Vital Signs Last 24 Hrs  T(C): 36.6 (06 Feb 2025 13:54), Max: 36.9 (06 Feb 2025 01:30)  T(F): 97.9 (06 Feb 2025 13:54), Max: 98.5 (06 Feb 2025 01:30)  HR: 77 (06 Feb 2025 13:54) (66 - 77)  BP: 136/60 (06 Feb 2025 13:54) (133/63 - 145/67)  BP(mean): --  RR: 18 (06 Feb 2025 13:54) (16 - 18)  SpO2: 99% (06 Feb 2025 13:54) (99% - 100%)    Parameters below as of 06 Feb 2025 13:54  Patient On (Oxygen Delivery Method): room air    PHYSICAL EXAM:  GENERAL: NAD, well-groomed, well-developed  EYES: EOMI, conjunctiva and sclera clear  ENMT: Moist mucous membranes  RESPIRATORY: Clear to auscultation bilaterally; No rales, rhonchi, wheezing, or rubs  CARDIOVASCULAR: Regular rate and rhythm; No murmurs, rubs, or gallops  GASTROINTESTINAL: Soft, Nontender, Nondistended; Bowel sounds present  GENITOURINARY: Not examined  EXTREMITIES: LUE pitting edema from hand up to shoulder; warm, well perfused  NERVOUS SYSTEM:  Alert & Oriented X3; Moving all 4 extremities  SKIN: No rashes or lesions    LABS:                        10.0   7.33  )-----------( 163      ( 04 Feb 2025 19:32 )             31.0     02-06    137  |  105  |  35[H]  ----------------------------<  87  5.0   |  24  |  3.72[H]    Ca    8.9      06 Feb 2025 06:10      PT/INR - ( 04 Feb 2025 19:15 )   PT: 10.6 sec;   INR: 0.91 ratio         PTT - ( 04 Feb 2025 19:15 )  PTT:31.9 sec      Urinalysis Basic - ( 06 Feb 2025 06:10 )    Color: x / Appearance: x / SG: x / pH: x  Gluc: 87 mg/dL / Ketone: x  / Bili: x / Urobili: x   Blood: x / Protein: x / Nitrite: x   Leuk Esterase: x / RBC: x / WBC x   Sq Epi: x / Non Sq Epi: x / Bacteria: x          RADIOLOGY & ADDITIONAL TESTS: No new imaging  Results Reviewed: Yes  Imaging Personally Reviewed:  Electrocardiogram Personally Reviewed:    COORDINATION OF CARE:  Care Discussed with Consultants/Other Providers [Y/N]:  Prior or Outpatient Records Reviewed [Y/N]:  
Overnight events:  None    Subjective:  Pt offers no complaints, states left arm has been swollen x months, Fistulogram 1/30 was performed and showed patent AVF with no evidence of stenosis in central vein, oliguric at baseline.        Objective:    Vital signs  T(C): , Max: 36.8 (02-04-25 @ 13:44)  HR: 72 (02-05-25 @ 05:10)  BP: 142/73 (02-05-25 @ 05:10)  SpO2: 100% (02-05-25 @ 05:10)  Wt(kg): --    Output   Void: none  02-04 @ 07:01  -  02-05 @ 07:00  --------------------------------------------------------  IN: 300 mL / OUT: 250 mL / NET: 50 mL    02-05 @ 07:01  -  02-05 @ 08:48  --------------------------------------------------------  IN: 100 mL / OUT: 0 mL / NET: 100 mL        Gen: NAD  Abd: soft, nontender  LLE with diffuse swelling, + thrill      Labs: none today        
Overnight events:  None, remained afebrile    Subjective:  Pt offers no complaints    Objective:    Vital signs  T(C): , Max: 36.9 (02-06-25 @ 01:30)  HR: 66 (02-06-25 @ 05:15)  BP: 145/67 (02-06-25 @ 05:15)  SpO2: 100% (02-06-25 @ 05:15)  Wt(kg): --    Output   Void: 875  02-05 @ 07:01  -  02-06 @ 07:00  --------------------------------------------------------  IN: 300 mL / OUT: 1325 mL / NET: -1025 mL        Gen: NAD  Abd: soft, nontender  : voiding    Labs       06 Feb 2025 06:10    137    |  105    |  35     ----------------------------<  87     5.0     |  24     |  3.72     Ca    8.9        06 Feb 2025 06:10

## 2025-02-06 NOTE — DISCHARGE NOTE NURSING/CASE MANAGEMENT/SOCIAL WORK - FINANCIAL ASSISTANCE
[facility] provides services at a reduced cost to those who are determined to be eligible through [facility]’s financial assistance program. Information regarding [facility]’s financial assistance program can be found by going to [email or web address] or by calling [phone].

## 2025-02-06 NOTE — DISCHARGE NOTE NURSING/CASE MANAGEMENT/SOCIAL WORK - NSDCPECAREGIVERED_GEN_ALL_CORE
Medline and carenotes for surgical procedure ureteral stent placement, diabetes, A1C, as well as DC Medications and side effects literature for patient reference.

## 2025-02-06 NOTE — DISCHARGE NOTE PROVIDER - NSDCCPCAREPLAN_GEN_ALL_CORE_FT
PRINCIPAL DISCHARGE DIAGNOSIS  Diagnosis: Ureteral stricture, right  Assessment and Plan of Treatment: Take tylenol as needed.  The stent is temporary, and must be exchanged every 3 months.  Dr. Phillip's office will call you with a follow up appointment.      SECONDARY DISCHARGE DIAGNOSES  Diagnosis: HTN (hypertension)  Assessment and Plan of Treatment: Continue current home medications and follow up with your primary care provider      Diagnosis: DM (diabetes mellitus)  Assessment and Plan of Treatment: Continue current home medications and follow up with your primary care provider      Diagnosis: H/O kidney transplant  Assessment and Plan of Treatment: Continue current home medications and follow up with your primary care provider      Diagnosis: BPH (benign prostatic hyperplasia)  Assessment and Plan of Treatment: Continue current home medications and follow up with your primary care provider

## 2025-02-06 NOTE — DISCHARGE NOTE PROVIDER - CARE PROVIDER_API CALL
John Phillip)  Urology  [address], [address]  [place] [address]  Phone: [phone]  Fax: [phone]  Follow Up Time:     Arian Bhakta  Nephrology  [address] [place] [id number]  Phone: [phone]  Fax: [phone]  Follow Up Time:

## 2025-02-06 NOTE — PROGRESS NOTE ADULT - ASSESSMENT
73 yo M h/o HTN, T2DM, Hep C, ESRD and kidney, renal cancer, prostate cancer, kidney transplant recipient  in 2018.  He had developed urinary retention s/p transurethral resection of the prostate. He had developed obstruction of the transplant ureter requiring treatment with the stent. The stent was last changed in August 2023. He is due for a cystoscopy and exchange of ureteral stent, pt preadmitted 2/4/2025 for IV abx secondary to Ucx E faecium, started on vanco by level. Pt oliguric at baseline, chronic LLE edema, doppler neg 12/2024, fistulogram 1/30 was performed and showed patent AVF with no evidence of stenosis in central vein.    2/5: pt offers no complaints, remained afebrile, for OR today    Plan:  -CTX  -f/u medicine  -NPO  -IVL  -DVT prophy, IS, OOB, ambulate    
75 yo M h/o HTN, T2DM, Hep C, ESRD and kidney, renal cancer, prostate cancer, kidney transplant recipient  in 2018.  He had developed urinary retention s/p transurethral resection of the prostate. He had developed obstruction of the transplant ureter requiring treatment with the stent. The stent was last changed in August 2023. He is due for a cystoscopy and exchange of ureteral stent, pt preadmitted 2/4/2025 for IV abx secondary to Ucx E faecium, started on vanco by level. Pt oliguric at baseline, chronic LLE edema, doppler neg 12/2024, fistulogram 1/30 was performed and showed patent AVF with no evidence of stenosis in central vein.    2/5: pt offers no complaints, remained afebrile, for OR today  2/6: pt underwent uncomplicated ureteral stent exchange yesterday, remains afebrile, tolerating diet, voiding    Plan:  -BMP reviewed  -CTX x one more today  -f/u medicine  -IVL  -DVT prophy, IS, OOB, ambulate  -d/c home no abx    
73 y/o male s/o Cystoscopy, right ureteral stent exchange, stable.     -Strict I&O's  -Analgesia prn  -Antiemetics prn  -DVT prophylaxis  -Incentive spirometry  -resume diet  -OOB  -Vanco trough is 7.5, discussed dosing with pharmacy.  Dosing 1250mg of Vancomycin tonight with repeat creatinine and vanco level tomorrow.   -AM BMP
74 year old male with a history of ESRD s/p renal transplant in 2018, prostate cancer s/p RT, RCC s/p nephrectomy, Hepatitis C s/p treatment, HTN, Type 2 DM presenting for positive urine culture, planned for cystoscopy and R ureteral stent exchange on 2/5.    #Enterococcus faecium in urine culture  -urine culture from 1/30 with >100k CFU enterococcus faecium  -c/w vancomycin by level  -check level prior to next dose    #Renal transplant recipient  -c/w home meds prednisone 5mg qd, tacrolimus 3mg qd, Cellcept 500mg PO BID  -check tacrolimus level prior to next dose  -s/p R ureteral stent exchange on 2/5    #CKD Stage 4  -Cr at baseline appears to be around 4  -avoid nephrotoxic agents, renally dose meds    #LUE swelling  -fistulogram recently done, vascular surgeon planning for LUE venogram and possible angioplasty outpatient- reminded patient to schedule an appointment    #HTN  -controlled  -c/w nifedipine and metoprolol succinate    #Type 2 DM  -A1c 6.7%  -low dose ISS, monitor FS qac and qHS

## 2025-02-06 NOTE — DISCHARGE NOTE PROVIDER - CARE PROVIDERS DIRECT ADDRESSES
[email or web address]

## 2025-02-06 NOTE — DISCHARGE NOTE PROVIDER - NSDCMRMEDTOKEN_GEN_ALL_CORE_FT
acetaminophen 325 mg oral tablet: 3 tab(s) orally every 6 hours as needed for pain  aspirin 81 mg oral tablet: 1 tab(s) orally once a day  atorvastatin 10 mg oral tablet: 1 tab(s) orally once a day  bisacodyl 5 mg oral tablet: 1 tab(s) orally once a day as needed  Daily fiber 100% psyllium Husk: 5 caps orally once a day as needed  Envarsus XR 1 mg oral tablet, extended release: 3 tab(s) orally once a day  glimepiride 2 mg oral tablet: 1 tab(s) orally 2 times a day  Metoprolol Succinate ER 50 mg oral tablet, extended release: 1 tab(s) orally once a day  mycophenolate mofetil 500 mg oral tablet: 1 tab(s) orally 2 times a day  NIFEdipine 60 mg oral tablet, extended release: 1 tab(s) orally once a day  omeprazole 20 mg oral delayed release tablet: 1 tab(s) orally once a day  predniSONE 5 mg oral tablet: 1 tab(s) orally once a day  probiotic with prebiotic: 1 tab orally once a day  Rybelsus 14 mg oral tablet: 1 tab(s) orally once a day

## 2025-02-06 NOTE — DISCHARGE NOTE PROVIDER - NSDCFUSCHEDAPPT_GEN_ALL_CORE_FT
Zandra Becerra  [facility]  RAD[facility] [address]  Scheduled Appointment: 02/25/2025    Arian Bhakta  [facility]  NEPHRO [address] [place] [address]  Scheduled Appointment: 03/31/2025

## 2025-02-06 NOTE — DISCHARGE NOTE NURSING/CASE MANAGEMENT/SOCIAL WORK - NSDCVIVACCINE_GEN_ALL_CORE_FT
influenza, injectable, quadrivalent, preservative free; 15-Sep-2016 13:35; Zulay Diego (RN); [employer]; [id number]; IntraMuscular; Deltoid Right.; 0.5 milliLiter(s); VIS (VIS Published: 07-Aug-2015, VIS Presented: 15-Sep-2016);   influenza, high-dose, quadrivalent; 09-Nov-2021 15:51; Anna Taylro (RN); [employer]; [id number] (Exp. Date: 30-Jun-2022); IntraMuscular; Deltoid Right.; 0.7 milliLiter(s); VIS (VIS Published: 06-Aug-2021, VIS Presented: 09-Nov-2021);

## 2025-02-06 NOTE — DISCHARGE NOTE NURSING/CASE MANAGEMENT/SOCIAL WORK - NSDCPNINST_GEN_ALL_CORE
Schedule follow-up appointment(s) as instructed for stent removal in office. Notify physician for signs/symptoms of infection, including chills and/or fever greater than 101 deg F; nausea, vomiting, and/or diarrhea; increased pain and/or pain not relieved by medications; You may have intermittent blood tinged urine and slight flank pain when you urinate. This is normal and due to the stent in your ureter. If your urine becomes bright red or with clots, please call the office. No heavy lifting or straining for 2 to 4 weeks. Drink plenty of fluids and take over-the-counter stool softeners to prevent constipation, which can be a side effect of some pain medications.

## 2025-02-07 ENCOUNTER — APPOINTMENT (OUTPATIENT)
Dept: ORTHOPEDIC SURGERY | Facility: CLINIC | Age: 75
End: 2025-02-07
Payer: MEDICARE

## 2025-02-07 DIAGNOSIS — M17.11 UNILATERAL PRIMARY OSTEOARTHRITIS, RIGHT KNEE: ICD-10-CM

## 2025-02-07 PROCEDURE — 20610 DRAIN/INJ JOINT/BURSA W/O US: CPT | Mod: RT

## 2025-02-07 PROCEDURE — 99214 OFFICE O/P EST MOD 30 MIN: CPT | Mod: 25

## 2025-02-13 ENCOUNTER — APPOINTMENT (OUTPATIENT)
Dept: VASCULAR SURGERY | Facility: CLINIC | Age: 75
End: 2025-02-13
Payer: MEDICARE

## 2025-02-13 VITALS
DIASTOLIC BLOOD PRESSURE: 70 MMHG | WEIGHT: 204 LBS | HEART RATE: 84 BPM | HEIGHT: 70 IN | BODY MASS INDEX: 29.2 KG/M2 | SYSTOLIC BLOOD PRESSURE: 176 MMHG

## 2025-02-13 DIAGNOSIS — Z94.0 KIDNEY TRANSPLANT STATUS: ICD-10-CM

## 2025-02-13 DIAGNOSIS — N18.4 CHRONIC KIDNEY DISEASE, STAGE 4 (SEVERE): ICD-10-CM

## 2025-02-13 PROCEDURE — 99214 OFFICE O/P EST MOD 30 MIN: CPT

## 2025-02-13 RX ORDER — OMEPRAZOLE 20 MG/1
20 CAPSULE, DELAYED RELEASE ORAL
Refills: 0 | Status: ACTIVE | COMMUNITY

## 2025-02-13 RX ORDER — BISACODYL 5 MG/1
5 TABLET ORAL
Refills: 0 | Status: ACTIVE | COMMUNITY

## 2025-02-13 RX ORDER — ORAL SEMAGLUTIDE 14 MG/1
14 TABLET ORAL
Refills: 0 | Status: ACTIVE | COMMUNITY

## 2025-02-25 ENCOUNTER — APPOINTMENT (OUTPATIENT)
Dept: RADIATION ONCOLOGY | Facility: CLINIC | Age: 75
End: 2025-02-25
Payer: MEDICARE

## 2025-02-25 VITALS
HEIGHT: 70 IN | OXYGEN SATURATION: 97 % | TEMPERATURE: 96.98 F | RESPIRATION RATE: 16 BRPM | HEART RATE: 99 BPM | SYSTOLIC BLOOD PRESSURE: 147 MMHG | DIASTOLIC BLOOD PRESSURE: 64 MMHG

## 2025-02-25 LAB — PSA SERPL-MCNC: 0.02 NG/ML

## 2025-02-25 PROCEDURE — 99214 OFFICE O/P EST MOD 30 MIN: CPT

## 2025-02-25 RX ORDER — ATORVASTATIN CALCIUM 10 MG/1
10 TABLET, FILM COATED ORAL
Refills: 0 | Status: ACTIVE | COMMUNITY
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
## 2025-03-17 ENCOUNTER — OUTPATIENT (OUTPATIENT)
Dept: OUTPATIENT SERVICES | Facility: HOSPITAL | Age: 75
LOS: 1 days | End: 2025-03-17
Payer: MEDICARE

## 2025-03-17 VITALS
HEIGHT: 70 IN | HEART RATE: 82 BPM | WEIGHT: 197.98 LBS | RESPIRATION RATE: 16 BRPM | OXYGEN SATURATION: 97 % | SYSTOLIC BLOOD PRESSURE: 152 MMHG | TEMPERATURE: 98 F | DIASTOLIC BLOOD PRESSURE: 79 MMHG

## 2025-03-17 DIAGNOSIS — Z90.5 ACQUIRED ABSENCE OF KIDNEY: Chronic | ICD-10-CM

## 2025-03-17 DIAGNOSIS — Z96.652 PRESENCE OF LEFT ARTIFICIAL KNEE JOINT: Chronic | ICD-10-CM

## 2025-03-17 DIAGNOSIS — N18.4 CHRONIC KIDNEY DISEASE, STAGE 4 (SEVERE): ICD-10-CM

## 2025-03-17 DIAGNOSIS — Z98.890 OTHER SPECIFIED POSTPROCEDURAL STATES: Chronic | ICD-10-CM

## 2025-03-17 DIAGNOSIS — Z01.818 ENCOUNTER FOR OTHER PREPROCEDURAL EXAMINATION: ICD-10-CM

## 2025-03-17 DIAGNOSIS — E11.9 TYPE 2 DIABETES MELLITUS WITHOUT COMPLICATIONS: ICD-10-CM

## 2025-03-17 DIAGNOSIS — Z90.79 ACQUIRED ABSENCE OF OTHER GENITAL ORGAN(S): Chronic | ICD-10-CM

## 2025-03-17 DIAGNOSIS — I77.0 ARTERIOVENOUS FISTULA, ACQUIRED: Chronic | ICD-10-CM

## 2025-03-17 DIAGNOSIS — Z94.0 KIDNEY TRANSPLANT STATUS: Chronic | ICD-10-CM

## 2025-03-17 DIAGNOSIS — A63.0 ANOGENITAL (VENEREAL) WARTS: Chronic | ICD-10-CM

## 2025-03-17 LAB
ANION GAP SERPL CALC-SCNC: 15 MMOL/L — SIGNIFICANT CHANGE UP (ref 5–17)
BUN SERPL-MCNC: 36 MG/DL — HIGH (ref 7–23)
CALCIUM SERPL-MCNC: 9.3 MG/DL — SIGNIFICANT CHANGE UP (ref 8.4–10.5)
CHLORIDE SERPL-SCNC: 108 MMOL/L — SIGNIFICANT CHANGE UP (ref 96–108)
CO2 SERPL-SCNC: 19 MMOL/L — LOW (ref 22–31)
CREAT SERPL-MCNC: 3.6 MG/DL — HIGH (ref 0.5–1.3)
EGFR: 17 ML/MIN/1.73M2 — LOW
EGFR: 17 ML/MIN/1.73M2 — LOW
GLUCOSE SERPL-MCNC: 227 MG/DL — HIGH (ref 70–99)
HCT VFR BLD CALC: 31.2 % — LOW (ref 39–50)
HGB BLD-MCNC: 9.8 G/DL — LOW (ref 13–17)
MCHC RBC-ENTMCNC: 28.7 PG — SIGNIFICANT CHANGE UP (ref 27–34)
MCHC RBC-ENTMCNC: 31.4 G/DL — LOW (ref 32–36)
MCV RBC AUTO: 91.2 FL — SIGNIFICANT CHANGE UP (ref 80–100)
NRBC BLD AUTO-RTO: 0 /100 WBCS — SIGNIFICANT CHANGE UP (ref 0–0)
PLATELET # BLD AUTO: 196 K/UL — SIGNIFICANT CHANGE UP (ref 150–400)
POTASSIUM SERPL-MCNC: 4.4 MMOL/L — SIGNIFICANT CHANGE UP (ref 3.5–5.3)
POTASSIUM SERPL-SCNC: 4.4 MMOL/L — SIGNIFICANT CHANGE UP (ref 3.5–5.3)
RBC # BLD: 3.42 M/UL — LOW (ref 4.2–5.8)
RBC # FLD: 13.9 % — SIGNIFICANT CHANGE UP (ref 10.3–14.5)
SODIUM SERPL-SCNC: 142 MMOL/L — SIGNIFICANT CHANGE UP (ref 135–145)
WBC # BLD: 7.85 K/UL — SIGNIFICANT CHANGE UP (ref 3.8–10.5)
WBC # FLD AUTO: 7.85 K/UL — SIGNIFICANT CHANGE UP (ref 3.8–10.5)

## 2025-03-17 PROCEDURE — 83036 HEMOGLOBIN GLYCOSYLATED A1C: CPT

## 2025-03-17 PROCEDURE — 85027 COMPLETE CBC AUTOMATED: CPT

## 2025-03-17 PROCEDURE — 80048 BASIC METABOLIC PNL TOTAL CA: CPT

## 2025-03-17 PROCEDURE — G0463: CPT

## 2025-03-17 NOTE — H&P PST ADULT - HISTORY OF PRESENT ILLNESS
75 y/o M with PMHx significant for ESRD on HD via L A/V fistula (~0715-7521) s/p Kidney Transplant Recipient (2018), HCV (from donor Kidney, treated with Epclusa), Prostate CA s/p RT and TURP, Urinary Incontinence, HTN, T2DM, reports he has been experiencing symptoms of swelling to his Left upper arm over the past 3 months approximately. He is scheduled for Left Forearm AV Fistula w/ Bonding with Dr. Serrano on 03/21/2025.       73 y/o M with PMHx significant for ESRD on HD via L A/V fistula (~1750-1843) s/p Kidney Transplant Recipient (2018), HCV (from donor Kidney, treated with Epclusa), Prostate CA s/p RT and TURP, Urinary Incontinence, HTN, T2DM, reports he has been experiencing symptoms of swelling to his Left upper arm over the past 3 months approximately. He is scheduled for Left Forearm AV Fistula w/ Banding with Dr. Serrano on 03/21/2025.

## 2025-03-17 NOTE — H&P PST ADULT - PROBLEM SELECTOR PLAN 2
VITAL SIGNS: I have reviewed nursing notes and confirm.  CONSTITUTIONAL: Well-developed; well-nourished; in no acute distress.   SKIN: skin exam is warm and dry, no acute rash.    HEAD: Normocephalic; atraumatic.  EYES: conjunctiva and sclera clear.  ENT: No nasal discharge; airway clear.  NECK: Supple; non tender.  CARD: S1, S2 normal; no murmurs, gallops, or rubs. Regular rate and rhythm.   RESP: + wheezing diffusely No rales or rhonchi.  ABD: Normal bowel sounds; soft; non-distended; non-tender  EXT: Normal ROM.  No clubbing, cyanosis or edema.   LYMPH: No acute cervical adenopathy.  NEURO: Alert, oriented, grossly unremarkable  PSYCH: Cooperative, appropriate. Hold Glimepiride on DOS. Last dose 03/20/2025 in the Morning.    Hold Rybelsus on DOS. Last dose 03/20/2025 in the Morning.

## 2025-03-17 NOTE — H&P PST ADULT - ASSESSMENT
DASI score: 5.2 METS  DASI activity: walking, light housework  Loose teeth or denture: dentures  MP: Class 3

## 2025-03-17 NOTE — H&P PST ADULT - NSICDXPASTSURGICALHX_GEN_ALL_CORE_FT
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 s/p Reversal    Kidney transplant recipient 2018  @ [facility] :  +  Hep C Donor    S/P anal fissurectomy and chemical denervation  with biopsy and fulguration of anal lesions, 2021    S/p nephrectomy left 9/15/2015   + Cancer    S/p nephrectomy right 12/10/2015   benign    S/P right knee arthroscopy     S/P total knee replacement, left     S/P TURP (transurethral resection of prostate) and Transplant Kidney Stent Replacemernt, 21

## 2025-03-17 NOTE — H&P PST ADULT - PROBLEM SELECTOR PLAN 1
Scheduled for Left Forearm AV Fistula w/ Bonding  Pre-procedure labs collected. Surgical soap given to patient. PST instructions provided. Patient verbalized understanding of instructions. Scheduled for Left Forearm AV Fistula w/ Banding  Pre-procedure labs collected. Surgical soap given to patient. PST instructions provided. Patient verbalized understanding of instructions.

## 2025-03-17 NOTE — H&P PST ADULT - PRIMARY CARE PROVIDER
PCP: Zenaida Desir [phone], Cardiologist PCP: Zenaida Desir [phone], Cardiologist: Valerio Del Cid [phone]

## 2025-03-21 ENCOUNTER — TRANSCRIPTION ENCOUNTER (OUTPATIENT)
Age: 75
End: 2025-03-21

## 2025-03-21 ENCOUNTER — APPOINTMENT (OUTPATIENT)
Dept: VASCULAR SURGERY | Facility: HOSPITAL | Age: 75
End: 2025-03-21

## 2025-03-21 ENCOUNTER — OUTPATIENT (OUTPATIENT)
Dept: INPATIENT UNIT | Facility: HOSPITAL | Age: 75
LOS: 1 days | End: 2025-03-21
Payer: MEDICARE

## 2025-03-21 VITALS
RESPIRATION RATE: 16 BRPM | DIASTOLIC BLOOD PRESSURE: 80 MMHG | SYSTOLIC BLOOD PRESSURE: 150 MMHG | OXYGEN SATURATION: 98 % | TEMPERATURE: 97 F | HEART RATE: 80 BPM

## 2025-03-21 VITALS
RESPIRATION RATE: 20 BRPM | HEART RATE: 91 BPM | OXYGEN SATURATION: 97 % | SYSTOLIC BLOOD PRESSURE: 159 MMHG | TEMPERATURE: 97 F | HEIGHT: 70 IN | DIASTOLIC BLOOD PRESSURE: 74 MMHG | WEIGHT: 197.98 LBS

## 2025-03-21 DIAGNOSIS — Z98.890 OTHER SPECIFIED POSTPROCEDURAL STATES: Chronic | ICD-10-CM

## 2025-03-21 DIAGNOSIS — Z96.652 PRESENCE OF LEFT ARTIFICIAL KNEE JOINT: Chronic | ICD-10-CM

## 2025-03-21 DIAGNOSIS — Z90.79 ACQUIRED ABSENCE OF OTHER GENITAL ORGAN(S): Chronic | ICD-10-CM

## 2025-03-21 DIAGNOSIS — N18.4 CHRONIC KIDNEY DISEASE, STAGE 4 (SEVERE): ICD-10-CM

## 2025-03-21 DIAGNOSIS — A63.0 ANOGENITAL (VENEREAL) WARTS: Chronic | ICD-10-CM

## 2025-03-21 DIAGNOSIS — Z90.5 ACQUIRED ABSENCE OF KIDNEY: Chronic | ICD-10-CM

## 2025-03-21 DIAGNOSIS — Z94.0 KIDNEY TRANSPLANT STATUS: Chronic | ICD-10-CM

## 2025-03-21 DIAGNOSIS — I77.0 ARTERIOVENOUS FISTULA, ACQUIRED: Chronic | ICD-10-CM

## 2025-03-21 LAB
GLUCOSE BLDC GLUCOMTR-MCNC: 111 MG/DL — HIGH (ref 70–99)
GLUCOSE BLDC GLUCOMTR-MCNC: 121 MG/DL — HIGH (ref 70–99)
POTASSIUM BLDV-SCNC: 5.1 MMOL/L — SIGNIFICANT CHANGE UP (ref 3.5–5.1)

## 2025-03-21 PROCEDURE — 37607 LIG/BANDING ANGIOACS AV FSTL: CPT | Mod: LT

## 2025-03-21 PROCEDURE — 84132 ASSAY OF SERUM POTASSIUM: CPT

## 2025-03-21 PROCEDURE — 82962 GLUCOSE BLOOD TEST: CPT

## 2025-03-21 PROCEDURE — C1768: CPT

## 2025-03-21 PROCEDURE — 37607 LIG/BANDING ANGIOACS AV FSTL: CPT

## 2025-03-21 PROCEDURE — C1889: CPT

## 2025-03-21 DEVICE — CLIP APPLIER COVIDIEN SURGICLIP III 9" SM: Type: IMPLANTABLE DEVICE | Site: LEFT | Status: FUNCTIONAL

## 2025-03-21 DEVICE — SURGIFOAM 8 X 12.5CM X 10MM (100): Type: IMPLANTABLE DEVICE | Site: LEFT | Status: FUNCTIONAL

## 2025-03-21 DEVICE — CLIP APPLIER COVIDIEN SURGICLIP II 9.75" MEDIUM: Type: IMPLANTABLE DEVICE | Site: LEFT | Status: FUNCTIONAL

## 2025-03-21 DEVICE — IMPLANTABLE DEVICE: Type: IMPLANTABLE DEVICE | Site: LEFT | Status: FUNCTIONAL

## 2025-03-21 RX ORDER — ONDANSETRON HCL/PF 4 MG/2 ML
4 VIAL (ML) INJECTION ONCE
Refills: 0 | Status: DISCONTINUED | OUTPATIENT
Start: 2025-03-21 | End: 2025-03-21

## 2025-03-21 RX ORDER — FENTANYL CITRATE-0.9 % NACL/PF 100MCG/2ML
50 SYRINGE (ML) INTRAVENOUS
Refills: 0 | Status: DISCONTINUED | OUTPATIENT
Start: 2025-03-21 | End: 2025-03-21

## 2025-03-21 RX ORDER — FENTANYL CITRATE-0.9 % NACL/PF 100MCG/2ML
25 SYRINGE (ML) INTRAVENOUS
Refills: 0 | Status: DISCONTINUED | OUTPATIENT
Start: 2025-03-21 | End: 2025-03-21

## 2025-03-21 RX ORDER — LIDOCAINE HCL/PF 10 MG/ML
0.2 VIAL (ML) INJECTION ONCE
Refills: 0 | Status: DISCONTINUED | OUTPATIENT
Start: 2025-03-21 | End: 2025-03-21

## 2025-03-21 RX ORDER — CEFAZOLIN SODIUM IN 0.9 % NACL 3 G/100 ML
2000 INTRAVENOUS SOLUTION, PIGGYBACK (ML) INTRAVENOUS ONCE
Refills: 0 | Status: COMPLETED | OUTPATIENT
Start: 2025-03-21 | End: 2025-03-21

## 2025-03-21 NOTE — ASU PATIENT PROFILE, ADULT - NSICDXPASTSURGICALHX_GEN_ALL_CORE_FT
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 s/p Reversal    Kidney transplant recipient 2018  @ [facility] :  +  Hep C Donor    S/P anal fissurectomy and chemical denervation  with biopsy and fulguration of anal lesions, 2021    S/p nephrectomy left 9/15/2015   + Cancer    S/p nephrectomy right 12/10/2015   benign    S/P right knee arthroscopy     S/P total knee replacement, left     S/P TURP (transurethral resection of prostate) and Transplant Kidney Stent Replacemernt, 21

## 2025-03-21 NOTE — PRE-ANESTHESIA EVALUATION ADULT - NSANTHPMHFT_GEN_ALL_CORE
74M hx of ESRD s/p kidney transplant with treated HCV from kidney, prostate CA, HTN, DM2, here for L forearm AV fistula creation with bonding.

## 2025-03-21 NOTE — ASU DISCHARGE PLAN (ADULT/PEDIATRIC) - "IF YOU OR YOUR GUARDIAN/FAMILY IS A SMOKER, IT IS IMPORTANT FOR YOUR HEALTH TO STOP SMOKING. PLEASE BE AWARE THAT SECOND HAND SMOKE IS ALSO HARMFUL."
Encounter Summary
  Created on: 2020
 
 SantosVeda
 External Reference #: [id number]
 : 42
 Sex: Female
 
 Demographics
 
 
+-----------------------+----------------------+
| Address               | [address]      |
|                       | [place]  [address] |
+-----------------------+----------------------+
| Home Phone            | [phone]      |
+-----------------------+----------------------+
| Preferred Language    | Unknown              |
+-----------------------+----------------------+
| Marital Status        |               |
+-----------------------+----------------------+
| [employer] Affiliation | [id number]                 |
+-----------------------+----------------------+
| Race                  | Unknown              |
+-----------------------+----------------------+
| Ethnic Group          | Unknown              |
+-----------------------+----------------------+
 
 
 Author
 
 
+--------------+--------------------------------------------+
| Author       | [facility]  |
|              | [facility]                                |
+--------------+--------------------------------------------+
| Organization | [facility]  |
|              | [facility]                                |
+--------------+--------------------------------------------+
| Address      | Unknown                                    |
+--------------+--------------------------------------------+
| Phone        | Unavailable                                |
+--------------+--------------------------------------------+
 
 
 
 Support
 
 
+----------------+--------------+-----------------+-----------------+
| Name           | Relationship | Address         | Phone           |
+----------------+--------------+-----------------+-----------------+
| Johan Santos | [place]         | [address] | [phone] |
|                |              | [place]  [address] |                 |
+----------------+--------------+-----------------+-----------------+
 
 
 
 
 Care Team Providers
 
 
+--------------------------+------+-----------------+
| Care Team Member Name    | Role | Phone           |
+--------------------------+------+-----------------+
| William Ferguson MD | PCP  | [phone] |
+--------------------------+------+-----------------+
 
 
 
 Reason for Visit
 
 
+-------------+----------+
| Reason      | Comments |
+-------------+----------+
| Depression  |          |
| Management  |          |
+-------------+----------+
 
 
 
 Encounter Details
 
 
+--------+-----------+---------------------+----------------------+-------------+
| Date   | Type      | Department          | Care Team            | Description |
+--------+-----------+---------------------+----------------------+-------------+
| / | Telephone |   [facility]         |   [place],       | Depression  |
| 2019   |           | NEPHROLOGY  [address]   | [address]  [address] Management  |
|        |           | [address]   |  [address] [place]    |             |
|        |           | [place], [place]     | [address]  [place], [place] |             |
|        |           | [address]          |  [address]  [phone] |             |
|        |           | [phone]        |   [phone] (Fax) |             |
+--------+-----------+---------------------+----------------------+-------------+
 
 
 
 Social History
 
 
+---------------+------------+-----------+--------+------------------+
| Tobacco Use   | Types      | Packs/Day | Years  | Date             |
|               |            |           | Used   |                  |
+---------------+------------+-----------+--------+------------------+
| Former Smoker | Cigarettes | 0.25      | 5      | Quit: 1968 |
+---------------+------------+-----------+--------+------------------+
 
 
 
+---------------------+---+---+---+
| Smokeless Tobacco:  |   |   |   |
| Never Used          |   |   |   |
+---------------------+---+---+---+
 
 
 
+-------------+-------------+---------+--------------+
| Alcohol Use | Drinks/Week | oz/Week | Comments     |
 
+-------------+-------------+---------+--------------+
| Yes         |             |         | Twice a year |
+-------------+-------------+---------+--------------+
 
 
 
+------------------+---------------+
| Sex Assigned at  | Date Recorded |
| Birth            |               |
+------------------+---------------+
| Not on file      |               |
+------------------+---------------+
 
 
 
+----------------+-------------+-------------+
| Job Start Date | Occupation  | Industry    |
+----------------+-------------+-------------+
| Not on file    | Not on file | Not on file |
+----------------+-------------+-------------+
 
 
 
+----------------+--------------+------------+
| Travel History | Travel Start | Travel End |
+----------------+--------------+------------+
 
 
 
+-------------------------------------+
| No recent travel history available. |
+-------------------------------------+
 documented as of this encounter
 
 Plan of Treatment
 
 
+--------+---------+------------+----------------------+-------------+
| Date   | Type    | Specialty  | Care Team            | Description |
+--------+---------+------------+----------------------+-------------+
| / | Office  | Nephrology |   Dante Escudero MD  |             |
| 2020   | Visit   |            |  [address] [facility]  |             |
|        |         |            | [address]  [facility] [place]   |             |
|        |         |            | [address]  [phone]  |             |
|        |         |            |  [phone] (Fax)  |             |
+--------+---------+------------+----------------------+-------------+
 documented as of this encounter
 
 Visit Diagnoses
 Not on filedocumented in this encounter Statement Selected

## 2025-03-21 NOTE — ASU PATIENT PROFILE, ADULT - FALL HARM RISK - UNIVERSAL INTERVENTIONS
Bed in lowest position, wheels locked, appropriate side rails in place/Call bell, personal items and telephone in reach/Instruct patient to call for assistance before getting out of bed or chair/Non-slip footwear when patient is out of bed/[place] to call system/Physically safe environment - no spills, clutter or unnecessary equipment/Purposeful Proactive Rounding/Room/bathroom lighting operational, light cord in reach

## 2025-03-21 NOTE — ASU DISCHARGE PLAN (ADULT/PEDIATRIC) - CALL YOUR DOCTOR IF YOU HAVE ANY OF THE FOLLOWING:
Bleeding that does not stop/Swelling that gets worse/Pain not relieved by Medications/Fever greater than (need to indicate Fahrenheit or Celsius)/Wound/Surgical Site with redness, or foul smelling discharge or pus/Numbness, tingling, color or temperature change to extremity
30

## 2025-03-21 NOTE — ASU DISCHARGE PLAN (ADULT/PEDIATRIC) - FINANCIAL ASSISTANCE
[facility] provides services at a reduced cost to those who are determined to be eligible through [facility]’s financial assistance program. Information regarding [facility]’s financial assistance program can be found by going to [email or web address] or by calling [phone].

## 2025-03-24 ENCOUNTER — RX RENEWAL (OUTPATIENT)
Age: 75
End: 2025-03-24

## 2025-03-31 ENCOUNTER — APPOINTMENT (OUTPATIENT)
Dept: NEPHROLOGY | Facility: CLINIC | Age: 75
End: 2025-03-31
Payer: MEDICARE

## 2025-03-31 VITALS
WEIGHT: 202 LBS | HEIGHT: 70 IN | TEMPERATURE: 207.86 F | OXYGEN SATURATION: 98 % | HEART RATE: 76 BPM | SYSTOLIC BLOOD PRESSURE: 148 MMHG | RESPIRATION RATE: 15 BRPM | DIASTOLIC BLOOD PRESSURE: 65 MMHG | BODY MASS INDEX: 28.92 KG/M2

## 2025-03-31 DIAGNOSIS — N39.0 URINARY TRACT INFECTION, SITE NOT SPECIFIED: ICD-10-CM

## 2025-03-31 DIAGNOSIS — Z79.60 LONG TERM (CURRENT) USE OF UNSPECIFIED IMMUNOMODULATORS AND IMMUNOSUPPRESSANTS: ICD-10-CM

## 2025-03-31 PROCEDURE — 99214 OFFICE O/P EST MOD 30 MIN: CPT

## 2025-03-31 RX ORDER — TACROLIMUS 4 MG/1
4 TABLET, EXTENDED RELEASE ORAL
Qty: 3 | Refills: 3 | Status: ACTIVE | COMMUNITY
Start: 2025-03-31

## 2025-04-01 ENCOUNTER — APPOINTMENT (OUTPATIENT)
Dept: VASCULAR SURGERY | Facility: CLINIC | Age: 75
End: 2025-04-01
Payer: MEDICARE

## 2025-04-01 VITALS
SYSTOLIC BLOOD PRESSURE: 166 MMHG | DIASTOLIC BLOOD PRESSURE: 84 MMHG | WEIGHT: 202 LBS | HEIGHT: 70 IN | HEART RATE: 99 BPM | TEMPERATURE: 209.3 F | BODY MASS INDEX: 28.92 KG/M2

## 2025-04-01 DIAGNOSIS — Z94.0 KIDNEY TRANSPLANT STATUS: ICD-10-CM

## 2025-04-01 PROCEDURE — 99024 POSTOP FOLLOW-UP VISIT: CPT

## 2025-04-03 ENCOUNTER — APPOINTMENT (OUTPATIENT)
Dept: TRANSPLANT | Facility: CLINIC | Age: 75
End: 2025-04-03

## 2025-04-08 ENCOUNTER — APPOINTMENT (OUTPATIENT)
Dept: TRANSPLANT | Facility: CLINIC | Age: 75
End: 2025-04-08

## 2025-04-08 LAB
ALBUMIN SERPL ELPH-MCNC: 3.7 G/DL
ANION GAP SERPL CALC-SCNC: 10 MMOL/L
BUN SERPL-MCNC: 40 MG/DL
CALCIUM SERPL-MCNC: 9.6 MG/DL
CHLORIDE SERPL-SCNC: 107 MMOL/L
CO2 SERPL-SCNC: 26 MMOL/L
CREAT SERPL-MCNC: 3.97 MG/DL
EGFRCR SERPLBLD CKD-EPI 2021: 15 ML/MIN/1.73M2
GLUCOSE SERPL-MCNC: 145 MG/DL
PHOSPHATE SERPL-MCNC: 4 MG/DL
POTASSIUM SERPL-SCNC: 5.1 MMOL/L
SODIUM SERPL-SCNC: 142 MMOL/L
TACROLIMUS SERPL-MCNC: 7.3 NG/ML

## 2025-04-11 ENCOUNTER — OUTPATIENT (OUTPATIENT)
Dept: OUTPATIENT SERVICES | Facility: HOSPITAL | Age: 75
LOS: 1 days | End: 2025-04-11
Payer: MEDICARE

## 2025-04-11 ENCOUNTER — APPOINTMENT (OUTPATIENT)
Dept: ULTRASOUND IMAGING | Facility: HOSPITAL | Age: 75
End: 2025-04-11

## 2025-04-11 DIAGNOSIS — Z90.79 ACQUIRED ABSENCE OF OTHER GENITAL ORGAN(S): Chronic | ICD-10-CM

## 2025-04-11 DIAGNOSIS — Z98.890 OTHER SPECIFIED POSTPROCEDURAL STATES: Chronic | ICD-10-CM

## 2025-04-11 DIAGNOSIS — I77.0 ARTERIOVENOUS FISTULA, ACQUIRED: Chronic | ICD-10-CM

## 2025-04-11 DIAGNOSIS — Z94.0 KIDNEY TRANSPLANT STATUS: Chronic | ICD-10-CM

## 2025-04-11 DIAGNOSIS — Z90.5 ACQUIRED ABSENCE OF KIDNEY: Chronic | ICD-10-CM

## 2025-04-11 DIAGNOSIS — Z94.0 KIDNEY TRANSPLANT STATUS: ICD-10-CM

## 2025-04-11 DIAGNOSIS — A63.0 ANOGENITAL (VENEREAL) WARTS: Chronic | ICD-10-CM

## 2025-04-11 DIAGNOSIS — Z96.652 PRESENCE OF LEFT ARTIFICIAL KNEE JOINT: Chronic | ICD-10-CM

## 2025-04-11 PROCEDURE — 76776 US EXAM K TRANSPL W/DOPPLER: CPT

## 2025-04-11 PROCEDURE — 76776 US EXAM K TRANSPL W/DOPPLER: CPT | Mod: 26,RT

## 2025-04-22 NOTE — PATIENT PROFILE ADULT - NSPROEDAREADYLEARN_GEN_A_NUR
Discharge instruction      Continue present plan very pleased,\  With your work and medications and regimen,    For cost savings and/or, possibly improve function, check on the price of Mounjaro  If similar and you want to switch I am not encouraging you you can read about these things but if you wish it would be okay    But otherwise if Mounjaro or Trulicity more affordable we can always make this switch if need be let me know      Otherwise    Follow-up 6 months as long as you are feeling good your blood pressure is controlled, for your wellness,  See an eye doctor yearly for prediabetes diabetes  Should look at your feet, at least once a year,    Immunizations look like you are up-to-date with everything yearly COVID and flu shot probably a good idea    Update me your progress after therapy  We will continue therapy lifetime    Make sure you are progressive and have improvement in your pain and functional levels, otherwise let me know if you need any other attention in this area, or not improving              
none

## 2025-04-24 ENCOUNTER — APPOINTMENT (OUTPATIENT)
Dept: UROLOGY | Facility: CLINIC | Age: 75
End: 2025-04-24
Payer: MEDICARE

## 2025-04-24 ENCOUNTER — NON-APPOINTMENT (OUTPATIENT)
Age: 75
End: 2025-04-24

## 2025-04-24 VITALS
HEART RATE: 112 BPM | SYSTOLIC BLOOD PRESSURE: 146 MMHG | DIASTOLIC BLOOD PRESSURE: 71 MMHG | HEIGHT: 70 IN | OXYGEN SATURATION: 96 % | RESPIRATION RATE: 15 BRPM | BODY MASS INDEX: 28.92 KG/M2 | WEIGHT: 202 LBS

## 2025-04-24 DIAGNOSIS — N39.0 URINARY TRACT INFECTION, SITE NOT SPECIFIED: ICD-10-CM

## 2025-04-24 DIAGNOSIS — N18.4 CHRONIC KIDNEY DISEASE, STAGE 4 (SEVERE): ICD-10-CM

## 2025-04-24 DIAGNOSIS — N40.1 BENIGN PROSTATIC HYPERPLASIA WITH LOWER URINARY TRACT SYMPMS: ICD-10-CM

## 2025-04-24 DIAGNOSIS — N39.46 MIXED INCONTINENCE: ICD-10-CM

## 2025-04-24 DIAGNOSIS — N13.8 BENIGN PROSTATIC HYPERPLASIA WITH LOWER URINARY TRACT SYMPMS: ICD-10-CM

## 2025-04-24 PROCEDURE — 51798 US URINE CAPACITY MEASURE: CPT

## 2025-04-24 PROCEDURE — 99213 OFFICE O/P EST LOW 20 MIN: CPT

## 2025-04-24 PROCEDURE — G2211 COMPLEX E/M VISIT ADD ON: CPT

## 2025-04-24 RX ORDER — TAMSULOSIN HYDROCHLORIDE 0.4 MG/1
0.4 CAPSULE ORAL
Qty: 90 | Refills: 3 | Status: ACTIVE | COMMUNITY
Start: 2025-04-24 | End: 1900-01-01

## 2025-04-25 ENCOUNTER — APPOINTMENT (OUTPATIENT)
Dept: TRANSPLANT | Facility: CLINIC | Age: 75
End: 2025-04-25

## 2025-04-25 LAB
APPEARANCE: CLEAR
BACTERIA: NEGATIVE /HPF
BILIRUBIN URINE: NEGATIVE
BLOOD URINE: ABNORMAL
CAST: 1 /LPF
COLOR: YELLOW
EPITHELIAL CELLS: 1 /HPF
GLUCOSE QUALITATIVE U: NEGATIVE MG/DL
KETONES URINE: NEGATIVE MG/DL
LEUKOCYTE ESTERASE URINE: ABNORMAL
MICROSCOPIC-UA: NORMAL
NITRITE URINE: NEGATIVE
PH URINE: 6
PROTEIN URINE: 300 MG/DL
RED BLOOD CELLS URINE: 5 /HPF
SPECIFIC GRAVITY URINE: 1.01
UROBILINOGEN URINE: 0.2 MG/DL
WHITE BLOOD CELLS URINE: 5 /HPF

## 2025-04-28 ENCOUNTER — RX RENEWAL (OUTPATIENT)
Age: 75
End: 2025-04-28

## 2025-04-29 DIAGNOSIS — A49.9 URINARY TRACT INFECTION, SITE NOT SPECIFIED: ICD-10-CM

## 2025-04-29 DIAGNOSIS — N39.0 URINARY TRACT INFECTION, SITE NOT SPECIFIED: ICD-10-CM

## 2025-04-29 LAB — BACTERIA UR CULT: ABNORMAL

## 2025-04-29 RX ORDER — CEFPODOXIME PROXETIL 100 MG/1
100 TABLET, FILM COATED ORAL
Qty: 10 | Refills: 0 | Status: ACTIVE | COMMUNITY
Start: 2025-04-29 | End: 1900-01-01

## 2025-06-24 ENCOUNTER — TRANSCRIPTION ENCOUNTER (OUTPATIENT)
Age: 75
End: 2025-06-24

## 2025-06-30 ENCOUNTER — APPOINTMENT (OUTPATIENT)
Dept: NEPHROLOGY | Facility: CLINIC | Age: 75
End: 2025-06-30
Payer: MEDICARE

## 2025-06-30 ENCOUNTER — LABORATORY RESULT (OUTPATIENT)
Age: 75
End: 2025-06-30

## 2025-06-30 ENCOUNTER — NON-APPOINTMENT (OUTPATIENT)
Age: 75
End: 2025-06-30

## 2025-06-30 VITALS
TEMPERATURE: 207.5 F | OXYGEN SATURATION: 96 % | HEART RATE: 79 BPM | WEIGHT: 192 LBS | HEIGHT: 70 IN | RESPIRATION RATE: 15 BRPM | DIASTOLIC BLOOD PRESSURE: 66 MMHG | BODY MASS INDEX: 27.49 KG/M2 | SYSTOLIC BLOOD PRESSURE: 118 MMHG

## 2025-06-30 LAB
ALBUMIN SERPL ELPH-MCNC: 3.9 G/DL
ALP BLD-CCNC: 74 U/L
ALT SERPL-CCNC: 17 U/L
ANION GAP SERPL CALC-SCNC: 14 MMOL/L
APPEARANCE: CLEAR
AST SERPL-CCNC: 23 U/L
BASOPHILS # BLD AUTO: 0.02 K/UL
BASOPHILS NFR BLD AUTO: 0.3 %
BILIRUB SERPL-MCNC: 0.2 MG/DL
BILIRUBIN URINE: NEGATIVE
BLOOD URINE: ABNORMAL
BUN SERPL-MCNC: 50 MG/DL
CALCIUM SERPL-MCNC: 9.3 MG/DL
CALCIUM SERPL-MCNC: 9.3 MG/DL
CHLORIDE SERPL-SCNC: 105 MMOL/L
CO2 SERPL-SCNC: 20 MMOL/L
COLOR: YELLOW
CREAT SERPL-MCNC: 5.02 MG/DL
CREAT SPEC-SCNC: 103 MG/DL
CREAT/PROT UR: 1 RATIO
EGFRCR SERPLBLD CKD-EPI 2021: 11 ML/MIN/1.73M2
EOSINOPHIL # BLD AUTO: 0.08 K/UL
EOSINOPHIL NFR BLD AUTO: 1.2 %
GLUCOSE QUALITATIVE U: NEGATIVE MG/DL
GLUCOSE SERPL-MCNC: 137 MG/DL
HCT VFR BLD CALC: 34 %
HGB BLD-MCNC: 10.5 G/DL
IMM GRANULOCYTES NFR BLD AUTO: 0.3 %
KETONES URINE: NEGATIVE MG/DL
LEUKOCYTE ESTERASE URINE: ABNORMAL
LYMPHOCYTES # BLD AUTO: 1.81 K/UL
LYMPHOCYTES NFR BLD AUTO: 28 %
MAGNESIUM SERPL-MCNC: 1.9 MG/DL
MAN DIFF?: NORMAL
MCHC RBC-ENTMCNC: 27.7 PG
MCHC RBC-ENTMCNC: 30.9 G/DL
MCV RBC AUTO: 89.7 FL
MONOCYTES # BLD AUTO: 0.45 K/UL
MONOCYTES NFR BLD AUTO: 7 %
NEUTROPHILS # BLD AUTO: 4.09 K/UL
NEUTROPHILS NFR BLD AUTO: 63.2 %
NITRITE URINE: NEGATIVE
PARATHYROID HORMONE INTACT: 140 PG/ML
PH URINE: 5.5
PHOSPHATE SERPL-MCNC: 4 MG/DL
PLATELET # BLD AUTO: 186 K/UL
POTASSIUM SERPL-SCNC: 5.3 MMOL/L
PROT SERPL-MCNC: 6.2 G/DL
PROT UR-MCNC: 100 MG/DL
PROTEIN URINE: 100 MG/DL
RBC # BLD: 3.79 M/UL
RBC # FLD: 15.5 %
SODIUM SERPL-SCNC: 138 MMOL/L
SPECIFIC GRAVITY URINE: 1.01
TACROLIMUS SERPL-MCNC: 4.2 NG/ML
UROBILINOGEN URINE: 0.2 MG/DL
WBC # FLD AUTO: 6.47 K/UL

## 2025-06-30 PROCEDURE — 99214 OFFICE O/P EST MOD 30 MIN: CPT

## 2025-07-01 ENCOUNTER — INPATIENT (INPATIENT)
Facility: HOSPITAL | Age: 75
LOS: 16 days | Discharge: HOME HEALTH | DRG: 684 | End: 2025-07-18
Attending: STUDENT IN AN ORGANIZED HEALTH CARE EDUCATION/TRAINING PROGRAM | Admitting: STUDENT IN AN ORGANIZED HEALTH CARE EDUCATION/TRAINING PROGRAM
Payer: MEDICARE

## 2025-07-01 VITALS
SYSTOLIC BLOOD PRESSURE: 128 MMHG | TEMPERATURE: 99 F | OXYGEN SATURATION: 97 % | RESPIRATION RATE: 18 BRPM | DIASTOLIC BLOOD PRESSURE: 68 MMHG | WEIGHT: 199.96 LBS | HEIGHT: 70 IN | HEART RATE: 78 BPM

## 2025-07-01 DIAGNOSIS — Z94.0 KIDNEY TRANSPLANT STATUS: Chronic | ICD-10-CM

## 2025-07-01 DIAGNOSIS — Z98.890 OTHER SPECIFIED POSTPROCEDURAL STATES: Chronic | ICD-10-CM

## 2025-07-01 DIAGNOSIS — I77.0 ARTERIOVENOUS FISTULA, ACQUIRED: Chronic | ICD-10-CM

## 2025-07-01 DIAGNOSIS — Z96.652 PRESENCE OF LEFT ARTIFICIAL KNEE JOINT: Chronic | ICD-10-CM

## 2025-07-01 DIAGNOSIS — N17.9 ACUTE KIDNEY FAILURE, UNSPECIFIED: ICD-10-CM

## 2025-07-01 DIAGNOSIS — Z90.79 ACQUIRED ABSENCE OF OTHER GENITAL ORGAN(S): Chronic | ICD-10-CM

## 2025-07-01 DIAGNOSIS — Z90.5 ACQUIRED ABSENCE OF KIDNEY: Chronic | ICD-10-CM

## 2025-07-01 DIAGNOSIS — A63.0 ANOGENITAL (VENEREAL) WARTS: Chronic | ICD-10-CM

## 2025-07-01 LAB
ALBUMIN SERPL ELPH-MCNC: 4.4 G/DL — SIGNIFICANT CHANGE UP (ref 3.3–5)
ALP SERPL-CCNC: 92 U/L — SIGNIFICANT CHANGE UP (ref 40–120)
ALT FLD-CCNC: 17 U/L — SIGNIFICANT CHANGE UP (ref 10–45)
ANION GAP SERPL CALC-SCNC: 12 MMOL/L — SIGNIFICANT CHANGE UP (ref 5–17)
ANION GAP SERPL CALC-SCNC: 13 MMOL/L — SIGNIFICANT CHANGE UP (ref 5–17)
APPEARANCE UR: CLEAR — SIGNIFICANT CHANGE UP
AST SERPL-CCNC: 32 U/L — SIGNIFICANT CHANGE UP (ref 10–40)
BACTERIA # UR AUTO: NEGATIVE /HPF — SIGNIFICANT CHANGE UP
BASOPHILS # BLD AUTO: 0.01 K/UL — SIGNIFICANT CHANGE UP (ref 0–0.2)
BASOPHILS NFR BLD AUTO: 0.2 % — SIGNIFICANT CHANGE UP (ref 0–2)
BILIRUB SERPL-MCNC: 0.3 MG/DL — SIGNIFICANT CHANGE UP (ref 0.2–1.2)
BILIRUB UR-MCNC: NEGATIVE — SIGNIFICANT CHANGE UP
BUN SERPL-MCNC: 55 MG/DL — HIGH (ref 7–23)
BUN SERPL-MCNC: 58 MG/DL — HIGH (ref 7–23)
CALCIUM SERPL-MCNC: 9.1 MG/DL — SIGNIFICANT CHANGE UP (ref 8.4–10.5)
CALCIUM SERPL-MCNC: 9.8 MG/DL — SIGNIFICANT CHANGE UP (ref 8.4–10.5)
CAST: 2 /LPF — SIGNIFICANT CHANGE UP (ref 0–4)
CHLORIDE SERPL-SCNC: 104 MMOL/L — SIGNIFICANT CHANGE UP (ref 96–108)
CHLORIDE SERPL-SCNC: 107 MMOL/L — SIGNIFICANT CHANGE UP (ref 96–108)
CO2 SERPL-SCNC: 19 MMOL/L — LOW (ref 22–31)
CO2 SERPL-SCNC: 20 MMOL/L — LOW (ref 22–31)
COLOR SPEC: YELLOW — SIGNIFICANT CHANGE UP
CREAT ?TM UR-MCNC: 126 MG/DL — SIGNIFICANT CHANGE UP
CREAT SERPL-MCNC: 5.57 MG/DL — HIGH (ref 0.5–1.3)
CREAT SERPL-MCNC: 5.66 MG/DL — HIGH (ref 0.5–1.3)
DIFF PNL FLD: ABNORMAL
EGFR: 10 ML/MIN/1.73M2 — LOW
EOSINOPHIL # BLD AUTO: 0.03 K/UL — SIGNIFICANT CHANGE UP (ref 0–0.5)
EOSINOPHIL NFR BLD AUTO: 0.5 % — SIGNIFICANT CHANGE UP (ref 0–6)
GAS PNL BLDV: SIGNIFICANT CHANGE UP
GLUCOSE BLDC GLUCOMTR-MCNC: 139 MG/DL — HIGH (ref 70–99)
GLUCOSE SERPL-MCNC: 164 MG/DL — HIGH (ref 70–99)
GLUCOSE SERPL-MCNC: 253 MG/DL — HIGH (ref 70–99)
GLUCOSE UR QL: NEGATIVE MG/DL — SIGNIFICANT CHANGE UP
HCT VFR BLD CALC: 37.6 % — LOW (ref 39–50)
HGB BLD-MCNC: 11.4 G/DL — LOW (ref 13–17)
IMM GRANULOCYTES # BLD AUTO: 0.02 K/UL — SIGNIFICANT CHANGE UP (ref 0–0.07)
IMM GRANULOCYTES NFR BLD AUTO: 0.3 % — SIGNIFICANT CHANGE UP (ref 0–0.9)
KETONES UR QL: NEGATIVE MG/DL — SIGNIFICANT CHANGE UP
LEUKOCYTE ESTERASE UR-ACNC: ABNORMAL
LYMPHOCYTES # BLD AUTO: 1.58 K/UL — SIGNIFICANT CHANGE UP (ref 1–3.3)
LYMPHOCYTES NFR BLD AUTO: 27.6 % — SIGNIFICANT CHANGE UP (ref 13–44)
MAGNESIUM SERPL-MCNC: 2.2 MG/DL — SIGNIFICANT CHANGE UP (ref 1.6–2.6)
MCHC RBC-ENTMCNC: 27.1 PG — SIGNIFICANT CHANGE UP (ref 27–34)
MCHC RBC-ENTMCNC: 30.3 G/DL — LOW (ref 32–36)
MCV RBC AUTO: 89.5 FL — SIGNIFICANT CHANGE UP (ref 80–100)
MONOCYTES # BLD AUTO: 0.33 K/UL — SIGNIFICANT CHANGE UP (ref 0–0.9)
MONOCYTES NFR BLD AUTO: 5.8 % — SIGNIFICANT CHANGE UP (ref 2–14)
NEUTROPHILS # BLD AUTO: 3.75 K/UL — SIGNIFICANT CHANGE UP (ref 1.8–7.4)
NEUTROPHILS NFR BLD AUTO: 65.6 % — SIGNIFICANT CHANGE UP (ref 43–77)
NITRITE UR-MCNC: NEGATIVE — SIGNIFICANT CHANGE UP
NRBC # BLD AUTO: 0 K/UL — SIGNIFICANT CHANGE UP (ref 0–0)
NRBC # FLD: 0 K/UL — SIGNIFICANT CHANGE UP (ref 0–0)
NRBC BLD AUTO-RTO: 0 /100 WBCS — SIGNIFICANT CHANGE UP (ref 0–0)
PH UR: 5.5 — SIGNIFICANT CHANGE UP (ref 5–8)
PHOSPHATE SERPL-MCNC: 4.3 MG/DL — SIGNIFICANT CHANGE UP (ref 2.5–4.5)
PLATELET # BLD AUTO: 191 K/UL — SIGNIFICANT CHANGE UP (ref 150–400)
PMV BLD: 9.3 FL — SIGNIFICANT CHANGE UP (ref 7–13)
POTASSIUM SERPL-MCNC: 5.1 MMOL/L — SIGNIFICANT CHANGE UP (ref 3.5–5.3)
POTASSIUM SERPL-MCNC: 6.9 MMOL/L — CRITICAL HIGH (ref 3.5–5.3)
POTASSIUM SERPL-SCNC: 5.1 MMOL/L — SIGNIFICANT CHANGE UP (ref 3.5–5.3)
POTASSIUM SERPL-SCNC: 6.9 MMOL/L — CRITICAL HIGH (ref 3.5–5.3)
POTASSIUM UR-SCNC: 31 MMOL/L — SIGNIFICANT CHANGE UP
PROT ?TM UR-MCNC: 113 MG/DL — HIGH (ref 0–12)
PROT SERPL-MCNC: 7.5 G/DL — SIGNIFICANT CHANGE UP (ref 6–8.3)
PROT UR-MCNC: 100 MG/DL
PROT/CREAT UR-RTO: 0.9 RATIO — HIGH (ref 0–0.2)
RBC # BLD: 4.2 M/UL — SIGNIFICANT CHANGE UP (ref 4.2–5.8)
RBC # FLD: 15.2 % — HIGH (ref 10.3–14.5)
RBC CASTS # UR COMP ASSIST: 54 /HPF — HIGH (ref 0–4)
SODIUM SERPL-SCNC: 136 MMOL/L — SIGNIFICANT CHANGE UP (ref 135–145)
SODIUM SERPL-SCNC: 139 MMOL/L — SIGNIFICANT CHANGE UP (ref 135–145)
SODIUM UR-SCNC: 58 MMOL/L — SIGNIFICANT CHANGE UP
SP GR SPEC: 1.01 — SIGNIFICANT CHANGE UP (ref 1–1.03)
SQUAMOUS # UR AUTO: 1 /HPF — SIGNIFICANT CHANGE UP (ref 0–5)
UROBILINOGEN FLD QL: 0.2 MG/DL — SIGNIFICANT CHANGE UP (ref 0.2–1)
WBC # BLD: 5.72 K/UL — SIGNIFICANT CHANGE UP (ref 3.8–10.5)
WBC # FLD AUTO: 5.72 K/UL — SIGNIFICANT CHANGE UP (ref 3.8–10.5)
WBC UR QL: 6 /HPF — HIGH (ref 0–5)

## 2025-07-01 PROCEDURE — 83935 ASSAY OF URINE OSMOLALITY: CPT

## 2025-07-01 PROCEDURE — 82947 ASSAY GLUCOSE BLOOD QUANT: CPT

## 2025-07-01 PROCEDURE — 84295 ASSAY OF SERUM SODIUM: CPT

## 2025-07-01 PROCEDURE — 82330 ASSAY OF CALCIUM: CPT

## 2025-07-01 PROCEDURE — 76776 US EXAM K TRANSPL W/DOPPLER: CPT

## 2025-07-01 PROCEDURE — 82803 BLOOD GASES ANY COMBINATION: CPT

## 2025-07-01 PROCEDURE — 84133 ASSAY OF URINE POTASSIUM: CPT

## 2025-07-01 PROCEDURE — 81001 URINALYSIS AUTO W/SCOPE: CPT

## 2025-07-01 PROCEDURE — 99285 EMERGENCY DEPT VISIT HI MDM: CPT | Mod: FS,GC

## 2025-07-01 PROCEDURE — 84100 ASSAY OF PHOSPHORUS: CPT

## 2025-07-01 PROCEDURE — 84156 ASSAY OF PROTEIN URINE: CPT

## 2025-07-01 PROCEDURE — 84132 ASSAY OF SERUM POTASSIUM: CPT

## 2025-07-01 PROCEDURE — 99283 EMERGENCY DEPT VISIT LOW MDM: CPT

## 2025-07-01 PROCEDURE — 85018 HEMOGLOBIN: CPT

## 2025-07-01 PROCEDURE — 83605 ASSAY OF LACTIC ACID: CPT

## 2025-07-01 PROCEDURE — 93010 ELECTROCARDIOGRAM REPORT: CPT

## 2025-07-01 PROCEDURE — 82435 ASSAY OF BLOOD CHLORIDE: CPT

## 2025-07-01 PROCEDURE — 85014 HEMATOCRIT: CPT

## 2025-07-01 PROCEDURE — 94640 AIRWAY INHALATION TREATMENT: CPT

## 2025-07-01 PROCEDURE — 84300 ASSAY OF URINE SODIUM: CPT

## 2025-07-01 PROCEDURE — 85025 COMPLETE CBC W/AUTO DIFF WBC: CPT

## 2025-07-01 PROCEDURE — 36410 VNPNXR 3YR/> PHY/QHP DX/THER: CPT

## 2025-07-01 PROCEDURE — 83735 ASSAY OF MAGNESIUM: CPT

## 2025-07-01 PROCEDURE — 76776 US EXAM K TRANSPL W/DOPPLER: CPT | Mod: 26,RT

## 2025-07-01 PROCEDURE — 80053 COMPREHEN METABOLIC PANEL: CPT

## 2025-07-01 PROCEDURE — 82962 GLUCOSE BLOOD TEST: CPT

## 2025-07-01 PROCEDURE — 74176 CT ABD & PELVIS W/O CONTRAST: CPT

## 2025-07-01 PROCEDURE — 74176 CT ABD & PELVIS W/O CONTRAST: CPT | Mod: 26

## 2025-07-01 PROCEDURE — 82570 ASSAY OF URINE CREATININE: CPT

## 2025-07-01 PROCEDURE — 80048 BASIC METABOLIC PNL TOTAL CA: CPT

## 2025-07-01 RX ORDER — SODIUM ZIRCONIUM CYCLOSILICATE 5 G/5G
10 POWDER, FOR SUSPENSION ORAL ONCE
Refills: 0 | Status: COMPLETED | OUTPATIENT
Start: 2025-07-01 | End: 2025-07-01

## 2025-07-01 RX ORDER — IPRATROPIUM BROMIDE AND ALBUTEROL SULFATE .5; 2.5 MG/3ML; MG/3ML
6 SOLUTION RESPIRATORY (INHALATION) ONCE
Refills: 0 | Status: COMPLETED | OUTPATIENT
Start: 2025-07-01 | End: 2025-07-01

## 2025-07-01 RX ORDER — DEXTROSE 50 % IN WATER 50 %
25 SYRINGE (ML) INTRAVENOUS ONCE
Refills: 0 | Status: DISCONTINUED | OUTPATIENT
Start: 2025-07-01 | End: 2025-07-18

## 2025-07-01 RX ORDER — DEXTROSE 50 % IN WATER 50 %
12.5 SYRINGE (ML) INTRAVENOUS ONCE
Refills: 0 | Status: DISCONTINUED | OUTPATIENT
Start: 2025-07-01 | End: 2025-07-18

## 2025-07-01 RX ORDER — DEXTROSE 50 % IN WATER 50 %
50 SYRINGE (ML) INTRAVENOUS ONCE
Refills: 0 | Status: COMPLETED | OUTPATIENT
Start: 2025-07-01 | End: 2025-07-01

## 2025-07-01 RX ORDER — SODIUM CHLORIDE 9 G/1000ML
1000 INJECTION, SOLUTION INTRAVENOUS
Refills: 0 | Status: DISCONTINUED | OUTPATIENT
Start: 2025-07-01 | End: 2025-07-18

## 2025-07-01 RX ADMIN — IPRATROPIUM BROMIDE AND ALBUTEROL SULFATE 6 MILLILITER(S): .5; 2.5 SOLUTION RESPIRATORY (INHALATION) at 20:06

## 2025-07-01 RX ADMIN — SODIUM ZIRCONIUM CYCLOSILICATE 10 GRAM(S): 5 POWDER, FOR SUSPENSION ORAL at 19:04

## 2025-07-01 RX ADMIN — Medication 5 UNIT(S): at 19:03

## 2025-07-01 NOTE — ED PROVIDER NOTE - RAPID ASSESSMENT
73 yo M h/o HTN, T2DM, Hep C, ESRD and kidney, renal cancer, prostate cancer, kidney transplant recipient  in 2018  Sent in by nephrologist Dr. Bhakta for abnormal labs.  Patient states had labs drawn yesterday and today received phone call that there was something abnormal and he must go to the emergency department.  Patient is unsure what the lab abnormality was.  Patient states he feels well today and has no complaints.    Patient was rapidly assessed via a telemedicine and/or role of Quick Triage MATY Morris. A limited history and assessment was performed. The patient will be seen and further evaluated in the main emergency department. The remainder of care and evaluation will be conducted by the primary emergency medicine team. Receiving team will follow up on labs, imaging and serially reassess patient as indicated. All further decisions regarding patient care, evaluation and disposition are at the discretion of the receiving primary emergency department team.

## 2025-07-01 NOTE — ED PROVIDER NOTE - PROGRESS NOTE DETAILS
Deb Costello MD, [facility] PGY-2: CMP notable for K of 6.9, will shift K with albuterol, insulin and give lokelma. With glucose of 287, deferring dextrose at this time, will monitor glucose q1 hr for the next several hours to ensure patient does not become hypoglycemic. Deb Costello MD, EM PGY-2: Discussed case with nephrology. Recommended admission to tele, start lokelma, shift K, get US and UA, ULytes, call urology to eval if there is hydro/for stent repositioning and to call transplant surgery team. Will appreciate recs. (Agatha Harvey MD-PGY2):  Received sign out at 1900. 74M with ESRD s/p kidney transplant (2018), HTN, DM2, HepC who came from nephrologist for rule out hydronephrosis with creatinine of 5 (baseline 3.6). Here labs with K 6.9 and okay EKG--shifted. CTAP with mild hydronephrosis. US pending. Nephro aware and recs for transplant/uro consults. Concern for stent misplacement (internal ureteral stent present with lower portion of stent within distal transplant ureter adjacent to reimplantation site). (Agatha Harvey MD-PGY1): Spoke with transplant nephro and they are aware. Urology consulted and will see patient. Sammi'd to medicine--pending admission. Transplant surgery consulted. Pending US. (Agatha Harvey MD-PGY1): reaching out to transplant surgery again for recs. Uro recs for IR. IR consult placed and they are aware. Medicine admission on telemetry accepted.

## 2025-07-01 NOTE — ED ADULT TRIAGE NOTE - HISTORY OF COVID-19 VACCINATION
Medication: propranolol, failed BP control.    Last office visit date: 1/30/24 BP was 130/80, Dr Beverly aware and advised continue same medications. Has upcoming visit.   Medication Refill Protocol Failed.  Protocol approved due to: data identified in chart review.  Called and spoke to Dawna, she hasn't taken the med in over a year, the supply she had on hand was old and she wanted to renew it. She said she takes it prn (rarely) for anxiety. 1 refill given.  
Yes

## 2025-07-01 NOTE — ED ADULT NURSE REASSESSMENT NOTE - NS ED NURSE REASSESS COMMENT FT1
Report received from NAHMU Burton. Pt received A&Ox4, vitals retaken and documented. Pt resting in stretcher, pt placed on CM and hyper K medications given with dayshift RN, pt updated on plan of care. Bed locked and in lowest position, side rails raised. Currently pending Meds.

## 2025-07-01 NOTE — CONSULT NOTE ADULT - ASSESSMENT
74y Male with PMHx of  ESRD on HD via L A/V fistula (~3163-1803) s/p Kidney Transplant Recipient (2018) c/b hydronephrosis in transplant kidney managed with ureteral stent exchanges (last exchange per chart review 02/2025 with Dr. 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 Patient denies any urinary symptoms, pain, or fevers at home.   AF in ER, VSS. Labs WBC 5.72, H/H 11.4/37.6, SCr 5.66 (from 3.60 in 03/2025)   CT with Mild hydroureteronephrosis of the right transplant kidney versus patulous collecting system. An internal ureteral stent is present with lower portion of the stent located within the distal transplant ureter adjacent to the reimplantation site. Consider repositioning.    Recs  - NPO  - Trend SCr  - Strict ins and outs   - Monitor potassium levels - deplete to normal range   -     Rest of plan pending discussion with attending     [facility]  [address] [place] [address], [address]  [place] [address] [phone]    74y Male with PMHx of  ESRD on HD via L A/V fistula (~4300-0969) s/p Kidney Transplant Recipient (2018) c/b hydronephrosis in transplant kidney managed with ureteral stent exchanges (last exchange per chart review 02/2025 with Dr. 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 Patient denies any urinary symptoms, pain, or fevers at home.   AF in ER, VSS. Labs WBC 5.72, H/H 11.4/37.6, SCr 5.66 (from 3.60 in 03/2025)   CT with Mild hydroureteronephrosis of the right transplant kidney versus patulous collecting system. An internal ureteral stent is present with lower portion of the stent located within the distal transplant ureter adjacent to the reimplantation site. Consider repositioning.    Recs  - NPO  - Trend SCr  - Strict ins and outs   - Monitor potassium levels - deplete to normal range   - consult IR for Nephrostomy tube vs possible nephroureterostomy tube  - consult transplant nephrology  - consult medicine for management of CHELA    Discussed with attending on call, Dr. Hill    [facility]  [address] [place] [address], [address] [address] [phone]

## 2025-07-01 NOTE — ED PROVIDER NOTE - NSICDXPASTSURGICALHX_GEN_ALL_CORE_FT
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 s/p Reversal    Kidney transplant recipient 2018  @ [facility] :  +  Hep C Donor    S/P anal fissurectomy and chemical denervation  with biopsy and fulguration of anal lesions, 2021    S/p nephrectomy left 9/15/2015   + Cancer    S/p nephrectomy right 12/10/2015   benign    S/P right knee arthroscopy     S/P total knee replacement, left     S/P TURP (transurethral resection of prostate) and Transplant Kidney Stent Replacemernt, 20

## 2025-07-01 NOTE — PATIENT PROFILE ADULT - FALL HARM RISK - HARM RISK INTERVENTIONS

## 2025-07-01 NOTE — CONSULT NOTE ADULT - SUBJECTIVE AND OBJECTIVE BOX
HPI:  74y Male with PMHx of  ESRD on HD via L A/V fistula (~7028-4353) s/p Kidney Transplant Recipient (2018) c/b hydronephrosis in transplant kidney managed with ureteral stent exchanges (last exchange per chart review 2025 with Dr. 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 Patient seen at bedside, denies any urinary symptoms, dysuria, gross hematuria, flank pain, abdominal pain, nausea/vomiting, fevers, chills. Endorses that his urine output may have decreased slightly, but not entirely sure.   Last ate at 5pm today.     PAST MEDICAL & SURGICAL HISTORY:  HTN (hypertension)      Type 2 diabetes mellitus  dx:       ESRD (end stage renal disease)  On Dialysis '  to 2018      Hepatitis C  In Donor Kidney: patient received treatment for Hep. C : post treatment: patient  tested Negative for Hep C      Benign prostatic hypertrophy      Anal fissure  dx: '    No surgery      Bowel obstruction  '    surgically treated w/ ileostomy; since reversed      Medial meniscus tear    Right      Renal cell carcinoma  s/p b/l nephrectomy and renal transplant in       Prostate cancer  s/p RT      Ureteral stricture of kidney transplant  2018      COVID-19 virus infection  2021, asymptomatic      Bilateral cataracts      Anemia secondary to renal failure      Urine retention      Other complication of kidney transplant      OA (osteoarthritis)      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 s/p Reversal      S/P right knee arthroscopy        Kidney transplant recipient  2018  @ [facility] :  +  Hep C Donor      H/O colonoscopy      S/P TURP (transurethral resection of prostate)  and Transplant Kidney Stent Replacemernt, 21      S/P anal fissurectomy  and chemical denervation  with biopsy and fulguration of anal lesions, 2021      Anal condyloma  S/P excision; 22      S/P total knee replacement, left          FAMILY HISTORY:  FH: breast cancer (Mother)    FH: type 2 diabetes (Father)    FH: heart attack (Sibling)  age 54 at death    No known  malignancy     Denies alcohol and drug abuse, nonsmoker     MEDICATIONS  (STANDING):  dextrose 5%. 1000 milliLiter(s) (100 mL/Hr) IV Continuous <Continuous>  dextrose 50% Injectable 25 Gram(s) IV Push once  dextrose 50% Injectable 12.5 Gram(s) IV Push once  dextrose 50% Injectable 25 Gram(s) IV Push once    MEDICATIONS  (PRN):    Allergies    No Known Allergies    Intolerances      REVIEW OF SYSTEMS: Pertinent positives and negatives as stated in HPI, otherwise negative    Vital signs  T(C): 36.8 (25 @ 19:00), Max: 37.1 (25 @ 10:55)  HR: 74 (25 @ 19:00)  BP: 134/66 (25 @ 19:00)  SpO2: 97% (25 @ 19:00)  Wt(kg): --    Physical Exam  Gen: NAD  HEENT: normocephalic, atraumatic, no scleral icterus  Pulm: CTA b/l, No respiratory distress, no subcostal retractions, no accessory muscle use   CV: no JVD  Abd: Soft, NT, ND  : Voiding, CYU in urinal at bedside   MSK:  Moving all extremities  NEURO: A&Ox3, no focal neurological deficits, CN 2-12 grossly intact  SKIN: warm, dry    LABS:  CBC                       11.4   5.72  )-----------( 191      ( 2025 16:43 )             37.6     BMP   -    136  |  104  |  55[H]  ----------------------------<  253[H]  6.9[HH]   |  19[L]  |  5.66[H]    Ca    9.8      2025 16:43  Phos  4.3     07-  Mg     2.2     -    TPro  7.5  /  Alb  4.4  /  TBili  0.3  /  DBili  x   /  AST  32  /  ALT  17  /  AlkPhos  92  07-        Urinalysis Basic - ( 2025 16:43 )    Color: x / Appearance: x / SG: x / pH: x  Gluc: 253 mg/dL / Ketone: x  / Bili: x / Urobili: x   Blood: x / Protein: x / Nitrite: x   Leuk Esterase: x / RBC: x / WBC x   Sq Epi: x / Non Sq Epi: x / Bacteria: x      Urine Cx:   Blood Cx:    Radiology:  < from: CT Abdomen and Pelvis No Cont (25 @ 17:44) >    ACC: [id number] EXAM:  CT ABDOMEN AND PELVIS   ORDERED BY:  ANTIONE GUZMÁN     PROCEDURE DATE:  2025          INTERPRETATION:  CLINICAL INFORMATION: Acute kidney injury, history of   kidney transplant.    COMPARISON: CT abdomen and pelvis dated 2024.    CONTRAST/COMPLICATIONS:  IV Contrast: NONE  Oral Contrast: NONE.    PROCEDURE:  CT of the Abdomen and Pelvis was performed.  Sagittal and coronal reformats were performed.    FINDINGS:  LOWER CHEST: Within normal limits.    LIVER: Withinnormal limits.  BILE DUCTS: Normal caliber.  GALLBLADDER: There are a few small calcified gallstones.  SPLEEN: Within normal limits.  PANCREAS: Within normal limits.  ADRENALS: Within normal limits.  KIDNEYS/URETERS: Postsurgical changes from bilateral nephrectomy and   right lower quadrant renal transplant. There is mild   hydroureteronephrosis and ureteral thickening of the transplant kidney.   An internal stent is present with lower portion of the stent partially   looped within the distal ureter adjacent to the reimplantation site. No   urinary tract calculi identified.    BLADDER: Mildly thick-walled.  REPRODUCTIVE ORGANS: Grossly unremarkable noncontrast appearance of the   prostate gland.    BOWEL: No bowel obstruction. Appendix is likely surgically absent with   probable prior ileocecectomy. There is diverticulosis of the left   hemicolon without surrounding inflammatory changes.  PERITONEUM/RETROPERITONEUM: Within normal limits.  VESSELS: Atherosclerotic changes. No aortic aneurysm.  LYMPH NODES: No lymphadenopathy.  ABDOMINAL WALL: There is a small fat-containing umbilical hernia and fat   and nonobstructed small bowel containing supraumbilical ventral hernia.   Tiny fat-containing left inguinal hernia.  BONES: Multilevel degenerative changes of the spine.    IMPRESSION:  1. Mild hydroureteronephrosis of the right transplant kidney versus   patulous collecting system. An internal ureteral stent is present with   lower portion of the stent located within the distal transplant ureter   adjacent to the reimplantation site. Consider repositioning.  2. The bladder is mildly thick-walled, which is nonspecific and could be   due to underdistention or superimposed urinary tract infection.    --- End of Report ---            SOLEDAD SULLIVAN MD; Attending Radiologist  This document has been electronically signed. 2025  6:01PM    < end of copied text >   HPI:  74y Male with PMHx of  ESRD on HD via L A/V fistula (~[id number]) s/p Kidney Transplant Recipient (2018) c/b hydronephrosis in transplant kidney managed with ureteral stent exchanges (last exchange per chart review 2025 with Dr. 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 Patient seen at bedside, denies any urinary symptoms, dysuria, gross hematuria, flank pain, abdominal pain, nausea/vomiting, fevers, chills. Endorses that his urine output may have decreased slightly, but not entirely sure.   Last ate at 5pm today.     wbc 5.7 / hgb 11.4 / Cr 5.66 (baseline ~3) / K 6.9    PAST MEDICAL & SURGICAL HISTORY:  HTN (hypertension)      Type 2 diabetes mellitus  dx:       ESRD (end stage renal disease)  On Dialysis '  to 2018      Hepatitis C  In Donor Kidney: patient received treatment for Hep. C : post treatment: patient  tested Negative for Hep C      Benign prostatic hypertrophy      Anal fissure  dx: '    No surgery      Bowel obstruction  '    surgically treated w/ ileostomy; since reversed      Medial meniscus tear    Right      Renal cell carcinoma  s/p b/l nephrectomy and renal transplant in 2018      Prostate cancer  s/p RT      Ureteral stricture of kidney transplant  2018      COVID-19 virus infection  2021, asymptomatic      Bilateral cataracts      Anemia secondary to renal failure      Urine retention      Other complication of kidney transplant      OA (osteoarthritis)      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 s/p Reversal      S/P right knee arthroscopy        Kidney transplant recipient  2018  @ [facility] :  +  Hep C Donor      H/O colonoscopy      S/P TURP (transurethral resection of prostate)  and Transplant Kidney Stent Replacemernt, 21      S/P anal fissurectomy  and chemical denervation  with biopsy and fulguration of anal lesions, 2021      Anal condyloma  S/P excision; 22      S/P total knee replacement, left          FAMILY HISTORY:  FH: breast cancer (Mother)    FH: type 2 diabetes (Father)    FH: heart attack (Sibling)  age 54 at death    No known  malignancy     Denies alcohol and drug abuse, nonsmoker     MEDICATIONS  (STANDING):  dextrose 5%. 1000 milliLiter(s) (100 mL/Hr) IV Continuous <Continuous>  dextrose 50% Injectable 25 Gram(s) IV Push once  dextrose 50% Injectable 12.5 Gram(s) IV Push once  dextrose 50% Injectable 25 Gram(s) IV Push once    MEDICATIONS  (PRN):    Allergies    No Known Allergies    Intolerances      REVIEW OF SYSTEMS: Pertinent positives and negatives as stated in HPI, otherwise negative    Vital signs  T(C): 36.8 (25 @ 19:00), Max: 37.1 (25 @ 10:55)  HR: 74 (25 @ 19:00)  BP: 134/66 (25 @ 19:00)  SpO2: 97% (25 @ 19:00)  Wt(kg): --    Physical Exam  Gen: NAD  HEENT: normocephalic, atraumatic, no scleral icterus  Pulm: CTA b/l, No respiratory distress, no subcostal retractions, no accessory muscle use   CV: no JVD  Abd: Soft, NT, ND  : Voiding, CYU in urinal at bedside   MSK:  Moving all extremities  NEURO: A&Ox3, no focal neurological deficits, CN 2-12 grossly intact  SKIN: warm, dry    LABS:  CBC                       11.4   5.72  )-----------( 191      ( 2025 16:43 )             37.6     BMP       136  |  104  |  55[H]  ----------------------------<  253[H]  6.9[HH]   |  19[L]  |  5.66[H]    Ca    9.8      2025 16:43  Phos  4.3     -  Mg     2.2         TPro  7.5  /  Alb  4.4  /  TBili  0.3  /  DBili  x   /  AST  32  /  ALT  17  /  AlkPhos  92  -        Urinalysis Basic - ( 2025 16:43 )    Color: x / Appearance: x / SG: x / pH: x  Gluc: 253 mg/dL / Ketone: x  / Bili: x / Urobili: x   Blood: x / Protein: x / Nitrite: x   Leuk Esterase: x / RBC: x / WBC x   Sq Epi: x / Non Sq Epi: x / Bacteria: x      Urine Cx:   Blood Cx:    Radiology:  < from: CT Abdomen and Pelvis No Cont (25 @ 17:44) >    ACC: [id number] EXAM:  CT ABDOMEN AND PELVIS   ORDERED BY:  ANTIONE GUZMÁN     PROCEDURE DATE:  2025          INTERPRETATION:  CLINICAL INFORMATION: Acute kidney injury, history of   kidney transplant.    COMPARISON: CT abdomen and pelvis dated 2024.    CONTRAST/COMPLICATIONS:  IV Contrast: NONE  Oral Contrast: NONE.    PROCEDURE:  CT of the Abdomen and Pelvis was performed.  Sagittal and coronal reformats were performed.    FINDINGS:  LOWER CHEST: Within normal limits.    LIVER: Withinnormal limits.  BILE DUCTS: Normal caliber.  GALLBLADDER: There are a few small calcified gallstones.  SPLEEN: Within normal limits.  PANCREAS: Within normal limits.  ADRENALS: Within normal limits.  KIDNEYS/URETERS: Postsurgical changes from bilateral nephrectomy and   right lower quadrant renal transplant. There is mild   hydroureteronephrosis and ureteral thickening of the transplant kidney.   An internal stent is present with lower portion of the stent partially   looped within the distal ureter adjacent to the reimplantation site. No   urinary tract calculi identified.    BLADDER: Mildly thick-walled.  REPRODUCTIVE ORGANS: Grossly unremarkable noncontrast appearance of the   prostate gland.    BOWEL: No bowel obstruction. Appendix is likely surgically absent with   probable prior ileocecectomy. There is diverticulosis of the left   hemicolon without surrounding inflammatory changes.  PERITONEUM/RETROPERITONEUM: Within normal limits.  VESSELS: Atherosclerotic changes. No aortic aneurysm.  LYMPH NODES: No lymphadenopathy.  ABDOMINAL WALL: There is a small fat-containing umbilical hernia and fat   and nonobstructed small bowel containing supraumbilical ventral hernia.   Tiny fat-containing left inguinal hernia.  BONES: Multilevel degenerative changes of the spine.    IMPRESSION:  1. Mild hydroureteronephrosis of the right transplant kidney versus   patulous collecting system. An internal ureteral stent is present with   lower portion of the stent located within the distal transplant ureter   adjacent to the reimplantation site. Consider repositioning.  2. The bladder is mildly thick-walled, which is nonspecific and could be   due to underdistention or superimposed urinary tract infection.    --- End of Report ---            SOLEDAD SULLIVAN MD; Attending Radiologist  This document has been electronically signed. 2025  6:01PM    < end of copied text >

## 2025-07-01 NOTE — CONSULT NOTE ADULT - ASSESSMENT
73 yo M h/o HTN, T2DM, Hep C, ESRD and kidney, renal cancer, prostate cancer, kidney transplant recipient  in 2018  Sent in by nephrologist Dr. Bhakta for rise in Scr. Transplant nephrology consulted for CHELA on CKD in DDRT patient    1. Pt. with kidney transplantation Hep C+ DDRT in 7/2018 ([facility]) currently admitted for CHELA on CKD  Transplant complicated by DGF, mild rejection treated with steroids, distal ureteral stricture requiring multiple procedures PCN, ureteral dilations, currently gets stent exchanges with Dr. Phillip.  Pts aaron creatinine 1.9 but had CHELA with pyelonephritis. Has had multiple episodes of CHELA. Last creatinine 3.97 in setting of likely UTI and moderate hydronephrosis on sono (4/2025). He was seen by urology and placed on flomax. Pt also has internal JJ stent being exchanged by Dr. Phillip in January 2025. Scr on admission is elevated at 5.57 on 7/1.  UA with proteins, large Blood with RBC, and positive LE. UPCR 0.9  US Transplanted Kidney: Mild hydronephrosis with ureteral thickening, grossly unchanged from prior. Ureteral stent reidentified.    Recommend: Urology recs appreciated. Consult transplant surgery for hydro of the Transplant kidney, frequent bladder scan and insert armenta if needed. Monitor labs and UOP. Avoid any potential nephrotoxins/NSAIDs/ACEi/ARBs. Dose medications as per eGFR.     2. Immunosuppression:   Simulect induction, ENV 6mg daily, target 5-7, MMF 250mg BID and pred 5.   Dose tacro per level. continue to monitor tacro levels daily - 30 mins before the morning dose.  c/w MMF and pred     3. DM2 - on glimepiride and Rybelsus - controlled. ISS and monitor BGM    4. HTN - Cont home regimen    5. Oxalaturia - continue calcium carbonate 600mgs BID with meals, and continue sodium citrate 15 ml BID.     6 Hyperkalemia: medically treat with lokelma. Low potassium diet     **Prelim recs: Please await final recommendations and attending attestation.     If you have any questions, please feel free to contact me  Mariah Jeffery MD  Nephrology Fellow  Page [phone] / [employer] Teams (Preferred); Please PAGE for urgent consults only.  (After 5pm or on weekends please page the on-call fellow)  73 yo M h/o HTN, T2DM, Hep C, ESRD and kidney, renal cancer, prostate cancer, kidney transplant recipient  in 2018  Sent in by nephrologist Dr. Bhakta for rise in Scr. Transplant nephrology consulted for CHELA on CKD in DDRT patient    1. Pt. with kidney transplantation Hep C+ DDRT in 7/2018 ([facility]) currently admitted for CHELA on CKD  Transplant complicated by DGF, mild rejection treated with steroids, distal ureteral stricture requiring multiple procedures PCN, ureteral dilations, currently gets stent exchanges with Dr. Phillip.  Pts aaron creatinine 1.9 but had CHELA with pyelonephritis. Has had multiple episodes of CHELA. Last creatinine 3.97 in setting of likely UTI and moderate hydronephrosis on sono (4/2025). He was seen by urology and placed on flomax. Pt also has internal JJ stent being exchanged by Dr. Phillip in January 2025. Scr on admission is elevated at 5.57 on 7/1.  UA with proteins, large Blood with RBC, and positive LE. UPCR 0.9  US Transplanted Kidney: Mild hydronephrosis with ureteral thickening, grossly unchanged from prior. Ureteral stent reidentified.    Recommend: Urology recs appreciated. Consult transplant surgery for hydro of the Transplant kidney, frequent bladder scan and insert armenta if needed. Check ULytes. Monitor labs and UOP. Avoid any potential nephrotoxins/NSAIDs/ACEi/ARBs. Dose medications as per eGFR.     2. Immunosuppression:   Simulect induction, ENV 6mg daily, target 5-7, MMF 250mg BID and pred 5.   Dose tacro per level. continue to monitor tacro levels daily - 30 mins before the morning dose.  c/w MMF and pred     3. DM2 - on glimepiride and Rybelsus - controlled. ISS and monitor BGM    4. HTN - Cont home regimen    5. Oxalaturia - continue calcium carbonate 600mgs BID with meals, and continue sodium citrate 15 ml BID.     6 Hyperkalemia: medically treat with lokelma. Low potassium diet     **Prelim recs: Please await final recommendations and attending attestation.     If you have any questions, please feel free to contact me  Mariah Jeffery MD  Nephrology Fellow  Page [phone] / Microsoft Teams (Preferred); Please PAGE for urgent consults only.  (After 5pm or on weekends please page the on-call fellow)

## 2025-07-01 NOTE — CONSULT NOTE ADULT - SUBJECTIVE AND OBJECTIVE BOX
[facility] DIVISION OF KIDNEY DISEASES AND HYPERTENSION -- [phone]  -- INITIAL CONSULT NOTE  --------------------------------------------------------------------------------  HPI: 75 yo M h/o HTN, T2DM, Hep C, ESRD and kidney, renal cancer, prostate cancer, kidney transplant recipient  in 2018  Sent in by nephrologist Dr. Bhakta for rise in Scr. Transplant nephrology consulted for CHELA on CKD in DDRT patient    Pt. with kidney transplantation Hep C+ DDRT in 2018 ([facility]) currently admitted for CHELA on CKD  Transplant complicated by DGF, mild rejection treated with steroids, distal ureteral stricture requiring multiple procedures PCN, ureteral dilations, currently gets stent exchanges with Dr. Phillip.  Pts aaron creatinine 1.9 but had CHELA with pyelonephritis. Has had multiple episodes of CHELA. Pt in process of relisting but was on hold due to prostate cancer and now GFR too high. Cell free DNA 0.2% in 2024. Last creatinine 3.97 in setting of likely UTI and moderate hydronephrosis on sono (2025). He was seen by urology and placed on flomax. Pt also has internal JJ stent being exchanged by Dr. Phillip in 2025. Scr on admission is elevated at 5.57 on .  Immunosuppression - simulect induction, ENV 6mg daily, target 5-7, MMF 250mgs BID and pred 5.   UA with proteins, large Blood with RBC, and positive LE. UPCR 0.9  US Transplanted Kidney: Mild hydronephrosis with ureteral thickening, grossly unchanged from prior. Ureteral stent reidentified.    Patient seen and examined earlier today. Feelin okay, no acute complaints. Noted to have LUE swelling but patient states it is chronic. Denies any headaches, dizziness, fever/chills, chest pain, SOB, and leg swelling.       PAST HISTORY  --------------------------------------------------------------------------------  PAST MEDICAL & SURGICAL HISTORY:  HTN (hypertension)  Type 2 diabetes mellitus  dx:   ESRD (end stage renal disease)  On Dialysis 2013 to 2018  Hepatitis C  In Donor Kidney: patient received treatment for Hep. C : post treatment: patient  tested Negative for Hep C  Benign prostatic hypertrophy  Anal fissure  dx: 2018   No surgery  Bowel obstruction  2017   surgically treated w/ ileostomy; since reversed  Medial meniscus tear    Right  Renal cell carcinoma  s/p b/l nephrectomy and renal transplant in   Prostate cancer  s/p RT  Ureteral stricture of kidney transplant    COVID-19 virus infection  2021, asymptomatic  Bilateral cataracts  Anemia secondary to renal failure  Urine retention  Other complication of kidney transplant  OA osteoarthritis)  AV fistula  2013/ left forearm  S/p nephrectomy  left 9/15/2015   + Cancer  S/p nephrectomy  right 12/10/2015   benign  H/O ileostomy  '    for 3 months. s/p Reversal  S/P right knee arthroscopy    Kidney transplant recipient  2018  @ [facility] :  +  Hep C Donor  H/O colonoscopy  S/P TURP (transurethral resection of prostate)  and Transplant Kidney Stent Replacemernt, 21  S/P anal fissurectomy  and chemical denervation  with biopsy and fulguration of anal lesions, 2021  Anal condyloma  S/P excision; 22  S/P total knee replacement, left    FAMILY HISTORY:  FH: breast cancer (Mother)    FH: type 2 diabetes (Father)    FH: heart attack (Sibling)  age 54 at death      PAST SOCIAL HISTORY:    ALLERGIES & MEDICATIONS  --------------------------------------------------------------------------------  Allergies    No Known Allergies    Intolerances      Standing Inpatient Medications  dextrose 5%. 1000 milliLiter(s) IV Continuous <Continuous>  dextrose 50% Injectable 25 Gram(s) IV Push once  dextrose 50% Injectable 12.5 Gram(s) IV Push once  dextrose 50% Injectable 25 Gram(s) IV Push once    PRN Inpatient Medications      REVIEW OF SYSTEMS  --------------------------------------------------------------------------------  Gen: No fevers/chills  Head/Eyes/Ears: No Headaches  Respiratory: No dyspnea, cough  CV: No chest pain  GI: No abdominal pain, diarrhea  : No dysuria, hematuria  MSK: LUE edema    All other systems were reviewed and are negative, except as noted.    VITALS/PHYSICAL EXAM  --------------------------------------------------------------------------------  T(C): 36.8 (25 @ 19:00), Max: 37.1 (25 @ 10:55)  HR: 74 (25 @ 19:00) (70 - 78)  BP: 134/66 (25 @ 19:00) (128/68 - 135/70)  RR: 16 (25 @ 19:00) (16 - 18)  SpO2: 97% (25 @ 19:00) (97% - 99%)  Wt(kg): --  Height (cm): 177.8 (25 @ 10:55)  Weight (kg): 90.7 (25 @ 10:55)  BMI (kg/m2): 28.7 (25 @ 10:55)  BSA (m2): 2.09 (25 @ 10:55)      PHYSICAL EXAM:  Gen: NAD  HEENT: MMM  Pulm: CTA B/L  CV: S1S2  Abd: Soft, +BS, midline scar +  Ext: No LE edema bilateral, LUE edema noted   Neuro: Awake  Skin: Warm and dry    LABS/STUDIES  --------------------------------------------------------------------------------              11.4   5.72  >-----------<  191      [25 16:43]              37.6     139  |  107  |  58  ----------------------------<  164      [25 22:39]  5.1   |  20  |  5.57        Ca     9.1     [25 22:39]      Mg     2.2     [25 16:43]      Phos  4.3     [25 16:43]    TPro  7.5  /  Alb  4.4  /  TBili  0.3  /  DBili  x   /  AST  32  /  ALT  17  /  AlkPhos  92  [25 16:43]          Creatinine Trend:  SCr 5.57 [:39]  SCr 5.66 [:43]    Urine Creatinine 126      [25 21:18]  Urine Protein 113      [25 21:18]  Urine Sodium 58      [25 21:18]  Urine Potassium 31      [25 21:18]

## 2025-07-01 NOTE — ED ADULT NURSE NOTE - NSICDXPASTSURGICALHX_GEN_ALL_CORE_FT
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 s/p Reversal    Kidney transplant recipient 2018  @ [facility] :  +  Hep C Donor    S/P anal fissurectomy and chemical denervation  with biopsy and fulguration of anal lesions, 2021    S/p nephrectomy left 9/15/2015   + Cancer    S/p nephrectomy right 12/10/2015   benign    S/P right knee arthroscopy     S/P total knee replacement, left     S/P TURP (transurethral resection of prostate) and Transplant Kidney Stent Replacemernt, 21

## 2025-07-01 NOTE — ED ADULT NURSE NOTE - OBJECTIVE STATEMENT
Pt is a 74y M A&O4 PMHx HTN, DM, Hep C, ESRD, renal Ca, prostate Ca, kidney transplant 2018 sent into ED by nephrologist for abnormal labs. Pt is a 74y M A&O4 PMHx HTN, DM, Hep C, ESRD, renal Ca, prostate Ca, kidney transplant 2018 sent into ED by nephrologist for abnormal labs. Pt denies fatigue, shortness of breath, chest pain, nausea, vomiting, urinary symptoms.. Pt ambulates independently. Pt moving all extremities. Pt breathing spontaneously and unlabored. Pt makes urine. placed in gown, side rails up for safety, bed in lowest position, call bell within reach, patient and family educated on plan of care, comfort and safety provided.

## 2025-07-01 NOTE — ED ADULT NURSE NOTE - NS ED NURSE LEVEL OF CONSCIOUSNESS ORIENTATION
Oriented - self; Oriented - place; Oriented - time Hydroquinone Counseling:  Patient advised that medication may result in skin irritation, lightening (hypopigmentation), dryness, and burning.  In the event of skin irritation, the patient was advised to reduce the amount of the drug applied or use it less frequently.  Rarely, spots that are treated with hydroquinone can become darker (pseudoochronosis).  Should this occur, patient instructed to stop medication and call the office. The patient verbalized understanding of the proper use and possible adverse effects of hydroquinone.  All of the patient's questions and concerns were addressed.

## 2025-07-01 NOTE — ED ADULT NURSE NOTE - NSFALLASSESSNEED_ED_ALL_ED
Requested Prescriptions     Signed Prescriptions Disp Refills    nitroglycerin (NITROSTAT) 0.4 mg SL tablet 1 Bottle 3     Si Tab by SubLINGual route every five (5) minutes as needed for Chest Pain. Authorizing Provider: Deshawn Fraser     Ordering User: Romaine Davis     Refill per verbal order Dr. Christopher Shepherd. no

## 2025-07-01 NOTE — ED PROVIDER NOTE - OBJECTIVE STATEMENT
74 year old male h/o HTN, T2DM, Hep C, ESRD and kidney, renal cancer, prostate cancer, kidney transplant recipient  in 2018 who presents to the ED sent in by nephrologist Dr. Bhakta for abnormal labs. Patient unsure what labs were abnormal. No acute complaints, including changes in urination, fever, cough, any pain. Upon chart review, patient sent from by Dr. Bhakta for admission for CHELA for Cr of 5 and r/o hydronephrosis. 74 year old male h/o HTN, T2DM, Hep C, ESRD and kidney, renal cancer, prostate cancer, kidney transplant recipient  in 2018 who presents to the ED sent in by nephrologist Dr. Bhakta for abnormal labs. Patient unsure what labs were abnormal. No acute complaints, including changes in urination, fever, cough, any pain. Upon chart review, patient sent from by Dr. Bhakta for admission for CHELA for Cr of 5 and rule out hydronephrosis.

## 2025-07-01 NOTE — ED PROVIDER NOTE - CLINICAL SUMMARY MEDICAL DECISION MAKING FREE TEXT BOX
74 year old male h/o HTN, T2DM, Hep C, ESRD and kidney, renal cancer, prostate cancer, kidney transplant recipient  in 2018 who presents to the ED sent in by nephrologist Dr. Bhakta for CHELA.     Will obtain US renal, labs, consult nephrology. Plan to admit. 74 year old male h/o HTN, T2DM, Hep C, ESRD and kidney, renal cancer, prostate cancer, kidney transplant recipient  in 2018 who presents to the ED sent in by nephrologist Dr. Bhakta for CHELA.     Will obtain CT noncon, urine, labs, consult nephrology. Plan to admit. 74 year old male h/o HTN, T2DM, Hep C, ESRD and kidney, renal cancer, prostate cancer, kidney transplant recipient  in 2018 who presents to the ED sent in by nephrologist Dr. Bhakta for CHELA. pt is asymptomatic in ER ECG normal sinus rhythm nonspecific stt changes     Will obtain CT noncon, urine, labs, consult nephrology. Plan to admit. ZR 74 year old male h/o HTN, T2DM, Hep C, ESRD and kidney, renal cancer, prostate cancer, kidney transplant recipient  in 2018 who presents to the ED sent in by nephrologist Dr. Bhakta for CHELA. pt is asymptomatic in ER ECG normal sinus rhythm nonspecific stt changes     Will obtain CT noncon, urine, labs, consult nephrology. ,transplant  Plan to admit. ZR

## 2025-07-02 DIAGNOSIS — I10 ESSENTIAL (PRIMARY) HYPERTENSION: ICD-10-CM

## 2025-07-02 DIAGNOSIS — Z29.9 ENCOUNTER FOR PROPHYLACTIC MEASURES, UNSPECIFIED: ICD-10-CM

## 2025-07-02 DIAGNOSIS — E11.9 TYPE 2 DIABETES MELLITUS WITHOUT COMPLICATIONS: ICD-10-CM

## 2025-07-02 DIAGNOSIS — N17.9 ACUTE KIDNEY FAILURE, UNSPECIFIED: ICD-10-CM

## 2025-07-02 LAB
ANION GAP SERPL CALC-SCNC: 14 MMOL/L — SIGNIFICANT CHANGE UP (ref 5–17)
BKV DNA SPEC QL NAA+PROBE: NOT DETECTED IU/ML
BUN SERPL-MCNC: 55 MG/DL — HIGH (ref 7–23)
CALCIUM SERPL-MCNC: 9.3 MG/DL — SIGNIFICANT CHANGE UP (ref 8.4–10.5)
CHLORIDE SERPL-SCNC: 108 MMOL/L — SIGNIFICANT CHANGE UP (ref 96–108)
CMV DNA SPEC QL NAA+PROBE: NOT DETECTED IU/ML
CMVPCR LOG: NOT DETECTED LOG10IU/ML
CO2 SERPL-SCNC: 16 MMOL/L — LOW (ref 22–31)
CREAT SERPL-MCNC: 5.25 MG/DL — HIGH (ref 0.5–1.3)
EGFR: 11 ML/MIN/1.73M2 — LOW
EGFR: 11 ML/MIN/1.73M2 — LOW
GLUCOSE BLDC GLUCOMTR-MCNC: 105 MG/DL — HIGH (ref 70–99)
GLUCOSE BLDC GLUCOMTR-MCNC: 145 MG/DL — HIGH (ref 70–99)
GLUCOSE BLDC GLUCOMTR-MCNC: 172 MG/DL — HIGH (ref 70–99)
GLUCOSE BLDC GLUCOMTR-MCNC: 175 MG/DL — HIGH (ref 70–99)
GLUCOSE BLDC GLUCOMTR-MCNC: 227 MG/DL — HIGH (ref 70–99)
GLUCOSE BLDC GLUCOMTR-MCNC: 80 MG/DL — SIGNIFICANT CHANGE UP (ref 70–99)
GLUCOSE SERPL-MCNC: 108 MG/DL — HIGH (ref 70–99)
MAGNESIUM SERPL-MCNC: 2.2 MG/DL — SIGNIFICANT CHANGE UP (ref 1.6–2.6)
OSMOLALITY UR: 368 MOSM/KG — SIGNIFICANT CHANGE UP (ref 50–1200)
PHOSPHATE SERPL-MCNC: 4.5 MG/DL — SIGNIFICANT CHANGE UP (ref 2.5–4.5)
POTASSIUM SERPL-MCNC: 5.1 MMOL/L — SIGNIFICANT CHANGE UP (ref 3.5–5.3)
POTASSIUM SERPL-SCNC: 5.1 MMOL/L — SIGNIFICANT CHANGE UP (ref 3.5–5.3)
SODIUM SERPL-SCNC: 138 MMOL/L — SIGNIFICANT CHANGE UP (ref 135–145)
TACROLIMUS SERPL-MCNC: 4.6 NG/ML — SIGNIFICANT CHANGE UP
UUN UR-MCNC: 503 MG/DL — SIGNIFICANT CHANGE UP

## 2025-07-02 PROCEDURE — 83935 ASSAY OF URINE OSMOLALITY: CPT

## 2025-07-02 PROCEDURE — 84100 ASSAY OF PHOSPHORUS: CPT

## 2025-07-02 PROCEDURE — 82803 BLOOD GASES ANY COMBINATION: CPT

## 2025-07-02 PROCEDURE — 74176 CT ABD & PELVIS W/O CONTRAST: CPT

## 2025-07-02 PROCEDURE — 82435 ASSAY OF BLOOD CHLORIDE: CPT

## 2025-07-02 PROCEDURE — 84133 ASSAY OF URINE POTASSIUM: CPT

## 2025-07-02 PROCEDURE — 99222 1ST HOSP IP/OBS MODERATE 55: CPT | Mod: FS

## 2025-07-02 PROCEDURE — 82947 ASSAY GLUCOSE BLOOD QUANT: CPT

## 2025-07-02 PROCEDURE — 82570 ASSAY OF URINE CREATININE: CPT

## 2025-07-02 PROCEDURE — 83735 ASSAY OF MAGNESIUM: CPT

## 2025-07-02 PROCEDURE — 80053 COMPREHEN METABOLIC PANEL: CPT

## 2025-07-02 PROCEDURE — 80197 ASSAY OF TACROLIMUS: CPT

## 2025-07-02 PROCEDURE — 84300 ASSAY OF URINE SODIUM: CPT

## 2025-07-02 PROCEDURE — 85018 HEMOGLOBIN: CPT

## 2025-07-02 PROCEDURE — 84540 ASSAY OF URINE/UREA-N: CPT

## 2025-07-02 PROCEDURE — 80048 BASIC METABOLIC PNL TOTAL CA: CPT

## 2025-07-02 PROCEDURE — 87086 URINE CULTURE/COLONY COUNT: CPT

## 2025-07-02 PROCEDURE — 85025 COMPLETE CBC W/AUTO DIFF WBC: CPT

## 2025-07-02 PROCEDURE — 94640 AIRWAY INHALATION TREATMENT: CPT

## 2025-07-02 PROCEDURE — 84156 ASSAY OF PROTEIN URINE: CPT

## 2025-07-02 PROCEDURE — 84295 ASSAY OF SERUM SODIUM: CPT

## 2025-07-02 PROCEDURE — 83605 ASSAY OF LACTIC ACID: CPT

## 2025-07-02 PROCEDURE — 82962 GLUCOSE BLOOD TEST: CPT

## 2025-07-02 PROCEDURE — 81001 URINALYSIS AUTO W/SCOPE: CPT

## 2025-07-02 PROCEDURE — 99222 1ST HOSP IP/OBS MODERATE 55: CPT | Mod: GC

## 2025-07-02 PROCEDURE — 85014 HEMATOCRIT: CPT

## 2025-07-02 PROCEDURE — 84132 ASSAY OF SERUM POTASSIUM: CPT

## 2025-07-02 PROCEDURE — 76776 US EXAM K TRANSPL W/DOPPLER: CPT

## 2025-07-02 PROCEDURE — 82330 ASSAY OF CALCIUM: CPT

## 2025-07-02 PROCEDURE — 99223 1ST HOSP IP/OBS HIGH 75: CPT

## 2025-07-02 RX ORDER — METOPROLOL SUCCINATE 50 MG/1
50 TABLET, EXTENDED RELEASE ORAL DAILY
Refills: 0 | Status: DISCONTINUED | OUTPATIENT
Start: 2025-07-02 | End: 2025-07-18

## 2025-07-02 RX ORDER — ATORVASTATIN CALCIUM 80 MG/1
10 TABLET, FILM COATED ORAL AT BEDTIME
Refills: 0 | Status: DISCONTINUED | OUTPATIENT
Start: 2025-07-02 | End: 2025-07-18

## 2025-07-02 RX ORDER — SODIUM BICARBONATE 1 MEQ/ML
650 SYRINGE (ML) INTRAVENOUS EVERY 8 HOURS
Refills: 0 | Status: DISCONTINUED | OUTPATIENT
Start: 2025-07-02 | End: 2025-07-18

## 2025-07-02 RX ORDER — GLUCAGON 3 MG/1
1 POWDER NASAL ONCE
Refills: 0 | Status: DISCONTINUED | OUTPATIENT
Start: 2025-07-02 | End: 2025-07-18

## 2025-07-02 RX ORDER — NIFEDIPINE 30 MG
60 TABLET, EXTENDED RELEASE 24 HR ORAL DAILY
Refills: 0 | Status: DISCONTINUED | OUTPATIENT
Start: 2025-07-02 | End: 2025-07-18

## 2025-07-02 RX ORDER — DEXTROSE 50 % IN WATER 50 %
15 SYRINGE (ML) INTRAVENOUS ONCE
Refills: 0 | Status: DISCONTINUED | OUTPATIENT
Start: 2025-07-02 | End: 2025-07-18

## 2025-07-02 RX ORDER — INSULIN LISPRO 100 U/ML
INJECTION, SOLUTION INTRAVENOUS; SUBCUTANEOUS AT BEDTIME
Refills: 0 | Status: DISCONTINUED | OUTPATIENT
Start: 2025-07-02 | End: 2025-07-18

## 2025-07-02 RX ORDER — INSULIN LISPRO 100 U/ML
INJECTION, SOLUTION INTRAVENOUS; SUBCUTANEOUS
Refills: 0 | Status: DISCONTINUED | OUTPATIENT
Start: 2025-07-02 | End: 2025-07-18

## 2025-07-02 RX ORDER — SULFAMETHOXAZOLE/TRIMETHOPRIM 800-160 MG
1 TABLET ORAL DAILY
Refills: 0 | Status: DISCONTINUED | OUTPATIENT
Start: 2025-07-02 | End: 2025-07-02

## 2025-07-02 RX ORDER — MYCOPHENOLATE MOFETIL 500 MG/1
500 TABLET, FILM COATED ORAL
Refills: 0 | Status: DISCONTINUED | OUTPATIENT
Start: 2025-07-02 | End: 2025-07-08

## 2025-07-02 RX ORDER — TAMSULOSIN HYDROCHLORIDE 0.4 MG/1
0.4 CAPSULE ORAL AT BEDTIME
Refills: 0 | Status: DISCONTINUED | OUTPATIENT
Start: 2025-07-02 | End: 2025-07-18

## 2025-07-02 RX ORDER — TACROLIMUS 0.5 MG/1
6 CAPSULE ORAL DAILY
Refills: 0 | Status: DISCONTINUED | OUTPATIENT
Start: 2025-07-02 | End: 2025-07-07

## 2025-07-02 RX ORDER — ATOVAQUONE 750 MG/5ML
1500 SUSPENSION ORAL DAILY
Refills: 0 | Status: DISCONTINUED | OUTPATIENT
Start: 2025-07-02 | End: 2025-07-02

## 2025-07-02 RX ORDER — PREDNISONE 20 MG/1
5 TABLET ORAL DAILY
Refills: 0 | Status: DISCONTINUED | OUTPATIENT
Start: 2025-07-02 | End: 2025-07-18

## 2025-07-02 RX ORDER — ASPIRIN 325 MG
81 TABLET ORAL DAILY
Refills: 0 | Status: DISCONTINUED | OUTPATIENT
Start: 2025-07-02 | End: 2025-07-02

## 2025-07-02 RX ORDER — ATOVAQUONE 750 MG/5ML
1500 SUSPENSION ORAL DAILY
Refills: 0 | Status: DISCONTINUED | OUTPATIENT
Start: 2025-07-03 | End: 2025-07-15

## 2025-07-02 RX ADMIN — ATORVASTATIN CALCIUM 10 MILLIGRAM(S): 80 TABLET, FILM COATED ORAL at 21:50

## 2025-07-02 RX ADMIN — METOPROLOL SUCCINATE 50 MILLIGRAM(S): 50 TABLET, EXTENDED RELEASE ORAL at 05:03

## 2025-07-02 RX ADMIN — Medication 650 MILLIGRAM(S): at 15:28

## 2025-07-02 RX ADMIN — INSULIN LISPRO 0: 100 INJECTION, SOLUTION INTRAVENOUS; SUBCUTANEOUS at 08:57

## 2025-07-02 RX ADMIN — INSULIN LISPRO 2: 100 INJECTION, SOLUTION INTRAVENOUS; SUBCUTANEOUS at 17:32

## 2025-07-02 RX ADMIN — MYCOPHENOLATE MOFETIL 500 MILLIGRAM(S): 500 TABLET, FILM COATED ORAL at 17:42

## 2025-07-02 RX ADMIN — PREDNISONE 5 MILLIGRAM(S): 20 TABLET ORAL at 05:03

## 2025-07-02 RX ADMIN — Medication 60 MILLIGRAM(S): at 05:03

## 2025-07-02 RX ADMIN — MYCOPHENOLATE MOFETIL 500 MILLIGRAM(S): 500 TABLET, FILM COATED ORAL at 05:03

## 2025-07-02 RX ADMIN — TACROLIMUS 6 MILLIGRAM(S): 0.5 CAPSULE ORAL at 08:00

## 2025-07-02 RX ADMIN — TAMSULOSIN HYDROCHLORIDE 0.4 MILLIGRAM(S): 0.4 CAPSULE ORAL at 21:50

## 2025-07-02 RX ADMIN — Medication 650 MILLIGRAM(S): at 21:50

## 2025-07-02 RX ADMIN — Medication 1 TABLET(S): at 11:56

## 2025-07-02 NOTE — H&P ADULT - PROBLEM SELECTOR PLAN 2
kidney transplantation Hep C+ DDRT in 7/2018 ([facility]). Transplant complicated by DGF, mild rejection treated with steroids, distal ureteral stricture requiring multiple procedures PCN, ureteral dilations, currently gets stent exchanges with Dr. Phillip.  - Continue immunosuppression: envarsus 6mg, MMF 500mg BID, prednisone 5mg  - Check tacrolimus level   - Seen by urology given mild hydronephrosis in transplanted kidney, recommend IR consult  - Transplant surgery consult in AM

## 2025-07-02 NOTE — H&P ADULT - NSICDXPASTSURGICALHX_GEN_ALL_CORE_FT
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 s/p Reversal    Kidney transplant recipient 2018  @ [facility] :  +  Hep C Donor    S/P anal fissurectomy and chemical denervation  with biopsy and fulguration of anal lesions, 2021    S/p nephrectomy left 9/15/2015   + Cancer    S/p nephrectomy right 12/10/2015   benign    S/P right knee arthroscopy     S/P total knee replacement, left     S/P TURP (transurethral resection of prostate) and Transplant Kidney Stent Replacemernt, 21

## 2025-07-02 NOTE — CONSULT NOTE ADULT - ASSESSMENT
74M PMH HTN, ESRD on HD via L A/V fistula (~9229-5722) s/p Kidney Transplant Recipient (2018) c/b hydronephrosis in transplant kidney managed with ureteral stent exchanges (last exchange per chart review 02/2025 with Dr. 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 Vascular called to evaluate his LUE radiocephalic fistula, which has a strong thrill.     Recommendations:  - No acute surgical intervention  - Keep LUE protected with pink band  - LUE AV duplex    Vascular Surgery  [id number]

## 2025-07-02 NOTE — H&P ADULT - NSHPREVIEWOFSYSTEMS_GEN_ALL_CORE
Review of Systems:   CONSTITUTIONAL: No fever, weight loss  EYES: No eye pain, visual disturbances, or discharge  ENMT:  No difficulty hearing, tinnitus, vertigo; No sinus or throat pain  RESPIRATORY: No SOB. No cough, wheezing, chills or hemoptysis  CARDIOVASCULAR: No chest pain, palpitations, dizziness, or leg swelling  GASTROINTESTINAL: No abdominal or epigastric pain. No nausea, vomiting, or hematemesis; No diarrhea or constipation. No melena or hematochezia.  GENITOURINARY: No dysuria, frequency, hematuria, or incontinence  NEUROLOGICAL: No headaches, memory loss, loss of strength, numbness, or tremors  SKIN: No itching, burning, rashes, or lesions   MUSCULOSKELETAL: No joint pain or swelling; No muscle, back pain  HEME/LYMPH: No easy bruising, or bleeding gums

## 2025-07-02 NOTE — H&P ADULT - NSHPPHYSICALEXAM_GEN_ALL_CORE
Vital Signs Last 24 Hrs  T(C): 37 (02 Jul 2025 04:05), Max: 37.1 (01 Jul 2025 10:55)  T(F): 98.6 (02 Jul 2025 04:05), Max: 98.8 (01 Jul 2025 10:55)  HR: 77 (02 Jul 2025 04:05) (70 - 78)  BP: 181/80 (02 Jul 2025 04:05) (128/68 - 181/80)  BP(mean): --  RR: 18 (02 Jul 2025 04:05) (16 - 18)  SpO2: 98% (02 Jul 2025 04:05) (97% - 99%)    Parameters below as of 02 Jul 2025 04:05  Patient On (Oxygen Delivery Method): room air        CONSTITUTIONAL: Well-groomed, in no apparent distress  EYES: No conjunctival or scleral injection, non-icteric; PERRLA and symmetric  ENMT: No external nasal lesions; nasal mucosa not inflamed; oral mucosa with moist membranes  RESPIRATORY: Breathing comfortably; lungs CTA without wheeze/rhonchi/rales  CARDIOVASCULAR: +S1S2, RRR, no M/G/R; trace lower extremity edema; LUE edema, LUE AVF with palpable thrill  GASTROINTESTINAL: No palpable masses or tenderness, +BS throughout, no rebound/guarding  MUSCULOSKELETAL: No digital clubbing or cyanosis; no paraspinal tenderness; no malalignment of extremities  SKIN: No rashes or ulcers noted; no subcutaneous nodules or induration palpable  NEUROLOGIC: CN grossly intact; sensation intact in LEs b/l to light touch; normal strength and tone  PSYCHIATRIC: A+O x 3; mood and affect appropriate; appropriate insight and judgment

## 2025-07-02 NOTE — PROGRESS NOTE ADULT - SUBJECTIVE AND OBJECTIVE BOX
Neelima Kerr MD  Division of Hospital Medicine  Available via MS Teams      Patient is a 74y old  Male who presents with a chief complaint of       SUBJECTIVE / OVERNIGHT EVENTS:  Overnight, no events.  No n/v/f/chills, cp, sob, abd pain.      I&O's Summary    01 Jul 2025 07:01  -  02 Jul 2025 07:00  --------------------------------------------------------  IN: 220 mL / OUT: 450 mL / NET: -230 mL    02 Jul 2025 07:01  -  02 Jul 2025 12:43  --------------------------------------------------------  IN: 240 mL / OUT: 550 mL / NET: -310 mL      Vital Signs Last 24 Hrs  T(C): 36.2 (02 Jul 2025 11:30), Max: 37 (02 Jul 2025 04:05)  T(F): 97.2 (02 Jul 2025 11:30), Max: 98.6 (02 Jul 2025 04:05)  HR: 66 (02 Jul 2025 11:30) (66 - 77)  BP: 159/74 (02 Jul 2025 11:30) (131/62 - 181/80)  BP(mean): 102 (02 Jul 2025 11:30) (102 - 102)  RR: 18 (02 Jul 2025 11:30) (16 - 18)  SpO2: 100% (02 Jul 2025 11:30) (97% - 100%)    Parameters below as of 02 Jul 2025 11:30  Patient On (Oxygen Delivery Method): room air        PHYSICAL EXAM:  GENERAL:  Well appearing, in NAD  HEAD:  NCAT  EYES: conjunctiva clear  NECK: Supple  CHEST/LUNG: CTA B/L. No w/r/r.  HEART: Reg rate. Normal S1, S2.  ABDOMEN: SNTND  EXTREMITIES:  2+ Peripheral Pulses, No clubbing, cyanosis, edema.  PSYCH: AAOx3, appropriate affect    LABS:                        11.4   5.72  )-----------( 191      ( 01 Jul 2025 16:43 )             37.6     07-02    138  |  108  |  55[H]  ----------------------------<  108[H]  5.1   |  16[L]  |  5.25[H]    Ca    9.3      02 Jul 2025 06:46  Phos  4.5     07-02  Mg     2.2     07-02    TPro  7.5  /  Alb  4.4  /  TBili  0.3  /  DBili  x   /  AST  32  /  ALT  17  /  AlkPhos  92  07-01          Urinalysis Basic - ( 02 Jul 2025 06:46 )    Color: x / Appearance: x / SG: x / pH: x  Gluc: 108 mg/dL / Ketone: x  / Bili: x / Urobili: x   Blood: x / Protein: x / Nitrite: x   Leuk Esterase: x / RBC: x / WBC x   Sq Epi: x / Non Sq Epi: x / Bacteria: x            CAPILLARY BLOOD GLUCOSE      POCT Blood Glucose.: 145 mg/dL (02 Jul 2025 12:40)  POCT Blood Glucose.: 105 mg/dL (02 Jul 2025 08:40)  POCT Blood Glucose.: 80 mg/dL (02 Jul 2025 04:00)  POCT Blood Glucose.: 139 mg/dL (01 Jul 2025 23:45)  POCT Blood Glucose.: 172 mg/dL (01 Jul 2025 22:21)  POCT Blood Glucose.: 253 mg/dL (01 Jul 2025 20:07)  POCT Blood Glucose.: 287 mg/dL (01 Jul 2025 18:44)    MEDICATIONS  (STANDING):  atorvastatin 10 milliGRAM(s) Oral at bedtime  dextrose 5%. 1000 milliLiter(s) (100 mL/Hr) IV Continuous <Continuous>  dextrose 50% Injectable 25 Gram(s) IV Push once  dextrose 50% Injectable 12.5 Gram(s) IV Push once  dextrose 50% Injectable 25 Gram(s) IV Push once  glucagon  Injectable 1 milliGRAM(s) IntraMuscular once  insulin lispro (ADMELOG) corrective regimen sliding scale   SubCutaneous three times a day before meals  insulin lispro (ADMELOG) corrective regimen sliding scale   SubCutaneous at bedtime  metoprolol succinate ER 50 milliGRAM(s) Oral daily  mycophenolate mofetil 500 milliGRAM(s) Oral two times a day  NIFEdipine XL 60 milliGRAM(s) Oral daily  predniSONE   Tablet 5 milliGRAM(s) Oral daily  sodium bicarbonate 650 milliGRAM(s) Oral every 8 hours  tacrolimus ER Tablet (ENVARSUS XR) 6 milliGRAM(s) Oral daily  tamsulosin 0.4 milliGRAM(s) Oral at bedtime    MEDICATIONS  (PRN):  dextrose Oral Gel 15 Gram(s) Oral once PRN Blood Glucose LESS THAN 70 milliGRAM(s)/deciliter

## 2025-07-02 NOTE — PROGRESS NOTE ADULT - PROBLEM SELECTOR PLAN 1
CHELA on CKD 2/2 malpositioned ureteral stent  Giovanny Scr 1.9, last Scr in 4/2025 3.60  - PCN placement tomorrow +/- stent retrieval per IR 7/3, hold ASA   - Nephrology following, recs appreciated  - bactrim changed to mepron for ppx  - sodium bicarb TID  - Monitor urine output, I/Os, Cr  - Has AVF - vasc consult to eval it is functioning in case he needs HD CEHLA on CKD 2/2 malpositioned ureteral stent  Giovanny Scr 1.9, last Scr in 4/2025 3.60  - PCN placement tomorrow +/- stent retrieval per IR 7/3, hold ASA   - Nephrology following, recs appreciated  - bactrim changed to mepron for ppx  - sodium bicarb TID  - Monitor urine output, I/Os, Cr  - serial bladder scans for PVR  - Has AVF - vasc consult to eval it is functioning in case he needs HD

## 2025-07-02 NOTE — CONSULT NOTE ADULT - SUBJECTIVE AND OBJECTIVE BOX
Interventional Radiology    Evaluate for Procedure: transplant pcn & stent retrieval    HPI: 74 year old Male with PMHx of  ESRD on HD via L A/V fistula (~4020-6429) s/p Kidney Transplant Recipient (2018) c/b hydronephrosis in transplant kidney managed with ureteral stent exchanges (last exchange per chart review 2025 with Dr. 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 Pt currently without any complaints or symptoms. Denies chest pain, dyspnea, abdominal pain, dysuria, He has been taking his medications as prescribed without issue. In the ED, labs notable for elevated SCr to 5.57. US kidney without any significant KEREN, mild hydronephrosis noted. Pt seen by nephrology and urology. Pt admitted for further management.   IR consulted for transplant pcn & stent retrieval    Allergies: No Known Allergies    Medications (Abx/Cardiac/Anticoagulation/Blood Products)  metoprolol succinate ER: 50 milliGRAM(s) Oral ( @ 05:03)  NIFEdipine XL: 60 milliGRAM(s) Oral ( @ 05:03)    Data:  177.8  87.7  T(C): 37  HR: 77  BP: 181/80  RR: 18  SpO2: 98%    -WBC 5.72 / HgB 11.4 / Hct 37.6 / Plt 191  -Na 138 / Cl 108 / BUN 55 / Glucose 108  -K 5.1 / CO2 16 / Cr 5.25  -ALT -- / Alk Phos -- / T.Bili --  -INR 0.91 / PTT 31.9    Radiology: CT reviewed    Assessment/Plan: 74 year old Male with PMHx of  ESRD on HD via L A/V fistula (~7297-7945) s/p Kidney Transplant Recipient (2018) c/b hydronephrosis in transplant kidney managed with ureteral stent exchanges (last exchange per chart review 2025 with Dr. 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 Pt currently without any complaints or symptoms. Denies chest pain, dyspnea, abdominal pain, dysuria, He has been taking his medications as prescribed without issue. In the ED, labs notable for elevated SCr to 5.57. US kidney without any significant KEREN, mild hydronephrosis noted. Pt seen by nephrology and urology. Pt admitted for further management.   IR consulted for transplant pcn & stent retrieval    - case reviewed and approved for transplant PCN placement tomorrow +/- stent retrieval (if unable to do retrieval tomorrow will plan for staged procedure and remove the stent next week)  - scheduling TBD    Any questions or concerns regarding above please reach out to IR:   -Available on microsoft teams  -During working hours (7a-5p): call [phone]  -Emergent issues after 5pm: page: [phone]  -Non-emergent consults: Please place a [place] order "IR Consult" with an appropriate callback number  -Scheduling questions: [phone]  -Clinic/Outpatient [phone]      ANDRE Ochoa- BC  Available on teams       Interventional Radiology    Evaluate for Procedure: transplant pcn & stent retrieval    HPI: 74 year old Male with PMHx of  ESRD on HD via L A/V fistula (~1496-5996) s/p Kidney Transplant Recipient (2018) c/b hydronephrosis in transplant kidney managed with ureteral stent exchanges (last exchange per chart review 2025 with Dr. 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 Pt currently without any complaints or symptoms. Denies chest pain, dyspnea, abdominal pain, dysuria, He has been taking his medications as prescribed without issue. In the ED, labs notable for elevated SCr to 5.57. US kidney without any significant KEREN, mild hydronephrosis noted. Pt seen by nephrology and urology. Pt admitted for further management.   IR consulted for transplant pcn & stent retrieval    Allergies: No Known Allergies    Medications (Abx/Cardiac/Anticoagulation/Blood Products)  metoprolol succinate ER: 50 milliGRAM(s) Oral ( @ 05:03)  NIFEdipine XL: 60 milliGRAM(s) Oral ( @ 05:03)    Data:  177.8  87.7  T(C): 37  HR: 77  BP: 181/80  RR: 18  SpO2: 98%    -WBC 5.72 / HgB 11.4 / Hct 37.6 / Plt 191  -Na 138 / Cl 108 / BUN 55 / Glucose 108  -K 5.1 / CO2 16 / Cr 5.25  -ALT -- / Alk Phos -- / T.Bili --  -INR 0.91 / PTT 31.9    Radiology: CT reviewed    Assessment/Plan: 74 year old Male with PMHx of  ESRD on HD via L A/V fistula (~1206-1192) s/p Kidney Transplant Recipient (2018) c/b hydronephrosis in transplant kidney managed with ureteral stent exchanges (last exchange per chart review 2025 with Dr. 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 Pt currently without any complaints or symptoms. Denies chest pain, dyspnea, abdominal pain, dysuria, He has been taking his medications as prescribed without issue. In the ED, labs notable for elevated SCr to 5.57. US kidney without any significant KEREN, mild hydronephrosis noted. Pt seen by nephrology and urology. Pt admitted for further management.   IR consulted for transplant pcn & stent retrieval    - case reviewed and approved for transplant PCN placement tomorrow +/- stent retrieval (if unable to do retrieval tomorrow will plan for staged procedure and remove the stent next week)  - ASA for 5 days pre-procedure with tentative resumption 24 hours post-procedure if no concern for bleeding. (last dose  per primary team)  - NPO at midnight  - STAT labs in am to be resulted by 7am  - d/w primary team      Any questions or concerns regarding above please reach out to IR:   -Available on [employer] teams  -During working hours (7a-5p): call [phone]  -Emergent issues after 5pm: page: [phone]  -Non-emergent consults: Please place a [place] order "IR Consult" with an appropriate callback number  -Scheduling questions: [phone]  -Clinic/Outpatient [phone]      ANDRE Ochoa- BC  Available on teams

## 2025-07-02 NOTE — H&P ADULT - PROBLEM SELECTOR PLAN 1
Giovanny Scr 1.9, last Scr in 4/2025 3.60  - Monitor urine output, I/Os  - Avoid nephrotoxic medications  - Trend Scr  - Nephrology following, recs appreciated  - May need to hold bactrim

## 2025-07-02 NOTE — H&P ADULT - ASSESSMENT
74 year old Male with PMHx of  ESRD s/p Kidney Transplant Recipient (2018) c/b hydronephrosis in transplant kidney managed with ureteral stent exchanges (last exchange per chart review 02/2025 with Dr. Phillip), HCV (from donor Kidney, treated with Epclusa), Prostate CA s/p RT and TURP, Urinary Incontinence, HTN, T2DM presents after getting sent in from his nephrologist' s office (Dr Bhakta) for elevated SCr. Pt admitted for further management.

## 2025-07-02 NOTE — CONSULT NOTE ADULT - SUBJECTIVE AND OBJECTIVE BOX
HPI: 74M PMH HTN, ESRD on HD via L A/V fistula (~4297-2322) s/p Kidney Transplant Recipient () c/b hydronephrosis in transplant kidney managed with ureteral stent exchanges (last exchange per chart review 2025 with Dr. 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 Pt currently without any complaints or symptoms. Reports persistent swelling of the LUE since 2024. Patient underwent banding of the AVF . Has not used AVF for HD since . Denies SOB.         PAST MEDICAL & SURGICAL HISTORY:  HTN (hypertension)      Type 2 diabetes mellitus  dx:       ESRD (end stage renal disease)  On Dialysis '  to 2018      Hepatitis C  In Donor Kidney: patient received treatment for Hep. C : post treatment: patient  tested Negative for Hep C      Benign prostatic hypertrophy      Anal fissure  dx: '    No surgery      Bowel obstruction  '    surgically treated w/ ileostomy; since reversed      Medial meniscus tear    Right      Renal cell carcinoma  s/p b/l nephrectomy and renal transplant in       Prostate cancer  s/p RT      Ureteral stricture of kidney transplant  2018      COVID-19 virus infection  2021, asymptomatic      Bilateral cataracts      Anemia secondary to renal failure      Urine retention      Other complication of kidney transplant      OA (osteoarthritis)      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 s/p Reversal      S/P right knee arthroscopy        Kidney transplant recipient  2018  @ [facility] :  +  Hep C Donor      H/O colonoscopy      S/P TURP (transurethral resection of prostate)  and Transplant Kidney Stent Replacemernt, 21      S/P anal fissurectomy  and chemical denervation  with biopsy and fulguration of anal lesions, 2021      Anal condyloma  S/P excision; 22      S/P total knee replacement, left          Physical Exam:  General: NAD, resting comfortably  HEENT: NC/AT, EOMI  Pulmonary: Normal respiratory effort on RA  Cardiovascular: Normal rate, normotensive  Abdominal: Soft, nondistended, nontender  Neuro: A/O x 3, CNs II-XII grossly intact, no focal deficits, moving extremities spontaneously  Extremities/Vascular: Warm, well perfused,  LUE edematous w/ strongly palpable thrill    Vital Signs Last 24 Hrs  T(C): 36.4 (2025 21:03), Max: 37 (2025 04:05)  T(F): 97.6 (2025 21:03), Max: 98.6 (2025 04:05)  HR: 71 (2025 21:03) (66 - 77)  BP: 145/67 (2025 21:03) (145/67 - 181/80)  BP(mean): 102 (2025 11:30) (102 - 102)  RR: 18 (2025 21:03) (18 - 18)  SpO2: 100% (2025 21:03) (98% - 100%)    Parameters below as of 2025 21:03  Patient On (Oxygen Delivery Method): room air

## 2025-07-02 NOTE — H&P ADULT - HISTORY OF PRESENT ILLNESS
74 year old Male with PMHx of  ESRD on HD via L A/V fistula (~0105-7656) s/p Kidney Transplant Recipient (2018) c/b hydronephrosis in transplant kidney managed with ureteral stent exchanges (last exchange per chart review 02/2025 with Dr. 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  74 year old Male with PMHx of  ESRD on HD via L A/V fistula (~6687-5097) s/p Kidney Transplant Recipient (2018) c/b hydronephrosis in transplant kidney managed with ureteral stent exchanges (last exchange per chart review 02/2025 with Dr. 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 Pt currently without any complaints or symptoms. Denies chest pain, dyspnea, abdominal pain, dysuria, He has been taking his medications as prescribed without issue. In the ED, labs notable for elevated SCr to 5.57. US kidney without any significant KEREN, mild hydronephrosis noted. Pt seen by nephrology and urology. Pt admitted for further management.

## 2025-07-02 NOTE — H&P ADULT - PROBLEM SELECTOR PLAN 4
On rybelsus and glimepiride  - Start low insulin sliding scale  - Check FS before meals and at bedtime  - CC DASH diet  - Continue statin

## 2025-07-02 NOTE — PROGRESS NOTE ADULT - ASSESSMENT
75 yo M PMH ESRD s/p Kidney Transplant Recipient (2018) c/b hydronephrosis in transplant kidney managed with ureteral stent exchanges (last exchange per chart review 02/2025 with Dr. 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

## 2025-07-02 NOTE — H&P ADULT - NSHPLABSRESULTS_GEN_ALL_CORE
07-01    139  |  107  |  58[H]  ----------------------------<  164[H]  5.1   |  20[L]  |  5.57[H]  07-01    136  |  104  |  55[H]  ----------------------------<  253[H]  6.9[HH]   |  19[L]  |  5.66[H]    Ca    9.1      01 Jul 2025 22:39  Ca    9.8      01 Jul 2025 16:43  Phos  4.3     07-01  Mg     2.2     07-01    TPro  7.5  /  Alb  4.4  /  TBili  0.3  /  DBili  x   /  AST  32  /  ALT  17  /  AlkPhos  92  07-01    Magnesium: 2.2 mg/dL (07-01-25 @ 16:43)    Phosphorus: 4.3 mg/dL (07-01-25 @ 16:43)                    Urinalysis Basic - ( 01 Jul 2025 22:39 )    Color: x / Appearance: x / SG: x / pH: x  Gluc: 164 mg/dL / Ketone: x  / Bili: x / Urobili: x   Blood: x / Protein: x / Nitrite: x   Leuk Esterase: x / RBC: x / WBC x   Sq Epi: x / Non Sq Epi: x / Bacteria: x                              11.4   5.72  )-----------( 191      ( 01 Jul 2025 16:43 )             37.6     CAPILLARY BLOOD GLUCOSE      POCT Blood Glucose.: 139 mg/dL (01 Jul 2025 23:45)  POCT Blood Glucose.: 253 mg/dL (01 Jul 2025 20:07)  POCT Blood Glucose.: 287 mg/dL (01 Jul 2025 18:44) 07-01    139  |  107  |  58[H]  ----------------------------<  164[H]  5.1   |  20[L]  |  5.57[H]  07-01    136  |  104  |  55[H]  ----------------------------<  253[H]  6.9[HH]   |  19[L]  |  5.66[H]    Ca    9.1      01 Jul 2025 22:39  Ca    9.8      01 Jul 2025 16:43  Phos  4.3     07-01  Mg     2.2     07-01    TPro  7.5  /  Alb  4.4  /  TBili  0.3  /  DBili  x   /  AST  32  /  ALT  17  /  AlkPhos  92  07-01    Magnesium: 2.2 mg/dL (07-01-25 @ 16:43)    Phosphorus: 4.3 mg/dL (07-01-25 @ 16:43)                    Urinalysis Basic - ( 01 Jul 2025 22:39 )    Color: x / Appearance: x / SG: x / pH: x  Gluc: 164 mg/dL / Ketone: x  / Bili: x / Urobili: x   Blood: x / Protein: x / Nitrite: x   Leuk Esterase: x / RBC: x / WBC x   Sq Epi: x / Non Sq Epi: x / Bacteria: x                          11.4   5.72  )-----------( 191      ( 01 Jul 2025 16:43 )             37.6     CAPILLARY BLOOD GLUCOSE      POCT Blood Glucose.: 139 mg/dL (01 Jul 2025 23:45)  POCT Blood Glucose.: 253 mg/dL (01 Jul 2025 20:07)  POCT Blood Glucose.: 287 mg/dL (01 Jul 2025 18:44)

## 2025-07-03 LAB
A1C WITH ESTIMATED AVERAGE GLUCOSE RESULT: 6.3 % — HIGH (ref 4–5.6)
ANION GAP SERPL CALC-SCNC: 11 MMOL/L — SIGNIFICANT CHANGE UP (ref 5–17)
APTT BLD: 32 SEC — SIGNIFICANT CHANGE UP (ref 26.1–36.8)
BUN SERPL-MCNC: 54 MG/DL — HIGH (ref 7–23)
CALCIUM SERPL-MCNC: 9.6 MG/DL — SIGNIFICANT CHANGE UP (ref 8.4–10.5)
CHLORIDE SERPL-SCNC: 110 MMOL/L — HIGH (ref 96–108)
CO2 SERPL-SCNC: 20 MMOL/L — LOW (ref 22–31)
CREAT SERPL-MCNC: 5.22 MG/DL — HIGH (ref 0.5–1.3)
CULTURE RESULTS: SIGNIFICANT CHANGE UP
EGFR: 11 ML/MIN/1.73M2 — LOW
EGFR: 11 ML/MIN/1.73M2 — LOW
ESTIMATED AVERAGE GLUCOSE: 134 MG/DL — HIGH (ref 68–114)
GLUCOSE BLDC GLUCOMTR-MCNC: 128 MG/DL — HIGH (ref 70–99)
GLUCOSE BLDC GLUCOMTR-MCNC: 170 MG/DL — HIGH (ref 70–99)
GLUCOSE BLDC GLUCOMTR-MCNC: 174 MG/DL — HIGH (ref 70–99)
GLUCOSE BLDC GLUCOMTR-MCNC: 194 MG/DL — HIGH (ref 70–99)
GLUCOSE SERPL-MCNC: 179 MG/DL — HIGH (ref 70–99)
HBV SURFACE AG SER-ACNC: SIGNIFICANT CHANGE UP
HCT VFR BLD CALC: 36.9 % — LOW (ref 39–50)
HGB BLD-MCNC: 11.2 G/DL — LOW (ref 13–17)
INR BLD: 0.91 RATIO — SIGNIFICANT CHANGE UP (ref 0.85–1.16)
MAGNESIUM SERPL-MCNC: 1.9 MG/DL — SIGNIFICANT CHANGE UP (ref 1.6–2.6)
MCHC RBC-ENTMCNC: 26.5 PG — LOW (ref 27–34)
MCHC RBC-ENTMCNC: 30.4 G/DL — LOW (ref 32–36)
MCV RBC AUTO: 87.2 FL — SIGNIFICANT CHANGE UP (ref 80–100)
NRBC # BLD AUTO: 0 K/UL — SIGNIFICANT CHANGE UP (ref 0–0)
NRBC # FLD: 0 K/UL — SIGNIFICANT CHANGE UP (ref 0–0)
NRBC BLD AUTO-RTO: 0 /100 WBCS — SIGNIFICANT CHANGE UP (ref 0–0)
PHOSPHATE SERPL-MCNC: 4.1 MG/DL — SIGNIFICANT CHANGE UP (ref 2.5–4.5)
PLATELET # BLD AUTO: 168 K/UL — SIGNIFICANT CHANGE UP (ref 150–400)
PMV BLD: 9.6 FL — SIGNIFICANT CHANGE UP (ref 7–13)
POTASSIUM SERPL-MCNC: 5.9 MMOL/L — HIGH (ref 3.5–5.3)
POTASSIUM SERPL-SCNC: 5.9 MMOL/L — HIGH (ref 3.5–5.3)
PROTHROM AB SERPL-ACNC: 10.5 SEC — SIGNIFICANT CHANGE UP (ref 9.9–13.4)
RBC # BLD: 4.23 M/UL — SIGNIFICANT CHANGE UP (ref 4.2–5.8)
RBC # FLD: 15.1 % — HIGH (ref 10.3–14.5)
SODIUM SERPL-SCNC: 141 MMOL/L — SIGNIFICANT CHANGE UP (ref 135–145)
SPECIMEN SOURCE: SIGNIFICANT CHANGE UP
TACROLIMUS SERPL-MCNC: 7.8 NG/ML — SIGNIFICANT CHANGE UP
WBC # BLD: 5.99 K/UL — SIGNIFICANT CHANGE UP (ref 3.8–10.5)
WBC # FLD AUTO: 5.99 K/UL — SIGNIFICANT CHANGE UP (ref 3.8–10.5)

## 2025-07-03 PROCEDURE — 99232 SBSQ HOSP IP/OBS MODERATE 35: CPT | Mod: GC,25

## 2025-07-03 PROCEDURE — 99233 SBSQ HOSP IP/OBS HIGH 50: CPT

## 2025-07-03 PROCEDURE — 93990 DOPPLER FLOW TESTING: CPT | Mod: 26

## 2025-07-03 RX ADMIN — METOPROLOL SUCCINATE 50 MILLIGRAM(S): 50 TABLET, EXTENDED RELEASE ORAL at 05:31

## 2025-07-03 RX ADMIN — MYCOPHENOLATE MOFETIL 500 MILLIGRAM(S): 500 TABLET, FILM COATED ORAL at 05:31

## 2025-07-03 RX ADMIN — PREDNISONE 5 MILLIGRAM(S): 20 TABLET ORAL at 05:31

## 2025-07-03 RX ADMIN — TACROLIMUS 6 MILLIGRAM(S): 0.5 CAPSULE ORAL at 13:04

## 2025-07-03 RX ADMIN — Medication 60 MILLIGRAM(S): at 05:31

## 2025-07-03 RX ADMIN — MYCOPHENOLATE MOFETIL 500 MILLIGRAM(S): 500 TABLET, FILM COATED ORAL at 21:18

## 2025-07-03 RX ADMIN — TAMSULOSIN HYDROCHLORIDE 0.4 MILLIGRAM(S): 0.4 CAPSULE ORAL at 21:18

## 2025-07-03 RX ADMIN — ATORVASTATIN CALCIUM 10 MILLIGRAM(S): 80 TABLET, FILM COATED ORAL at 21:18

## 2025-07-03 RX ADMIN — Medication 650 MILLIGRAM(S): at 13:03

## 2025-07-03 RX ADMIN — ATOVAQUONE 1500 MILLIGRAM(S): 750 SUSPENSION ORAL at 21:18

## 2025-07-03 RX ADMIN — Medication 650 MILLIGRAM(S): at 05:31

## 2025-07-03 RX ADMIN — INSULIN LISPRO 1: 100 INJECTION, SOLUTION INTRAVENOUS; SUBCUTANEOUS at 17:08

## 2025-07-03 RX ADMIN — Medication 650 MILLIGRAM(S): at 21:18

## 2025-07-03 NOTE — PROGRESS NOTE ADULT - ASSESSMENT
74M PMH HTN, ESRD on HD via L A/V fistula (~[id number]) s/p Kidney Transplant Recipient (2018) c/b hydronephrosis in transplant kidney managed with ureteral stent exchanges (last exchange per chart review 02/2025 with Dr. 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 Vascular called to evaluate his LUE radiocephalic fistula, which has a strong thrill.     Recommendations:  - No acute surgical intervention  - Keep LUE protected with pink band  - Pending LUE AV duplex    Vascular Surgery  [id number]

## 2025-07-03 NOTE — PROGRESS NOTE ADULT - SUBJECTIVE AND OBJECTIVE BOX
Neelima Kerr MD  Division of Hospital Medicine  Available via MS Teams      Patient is a 74y old  Male who presents with a chief complaint of CHELA (02 Jul 2025 12:42)        SUBJECTIVE / OVERNIGHT EVENTS:  Overnight, no events.  No n/v/f/chills, cp, sob, abd pain.  no complaints    I&O's Summary    02 Jul 2025 07:01  -  03 Jul 2025 07:00  --------------------------------------------------------  IN: 460 mL / OUT: 1750 mL / NET: -1290 mL      Vital Signs Last 24 Hrs  T(C): 36.5 (03 Jul 2025 12:02), Max: 37 (03 Jul 2025 05:08)  T(F): 97.7 (03 Jul 2025 12:02), Max: 98.6 (03 Jul 2025 05:08)  HR: 72 (03 Jul 2025 12:02) (71 - 72)  BP: 147/73 (03 Jul 2025 12:02) (145/67 - 161/80)  BP(mean): --  RR: 18 (03 Jul 2025 12:02) (18 - 18)  SpO2: 100% (03 Jul 2025 12:02) (100% - 100%)    Parameters below as of 03 Jul 2025 12:02  Patient On (Oxygen Delivery Method): room air        PHYSICAL EXAM:  GENERAL:  Well appearing, in NAD  HEAD:  NCAT  EYES: conjunctiva clear  NECK: Supple  CHEST/LUNG: CTA B/L. No w/r/r.  HEART: Reg rate. Normal S1, S2.  ABDOMEN: SNTND  EXTREMITIES:  2+ Peripheral Pulses, No clubbing, cyanosis, edema.  LUE AVF  PSYCH: AAOx3, appropriate affect    LABS:                        11.2   5.99  )-----------( 168      ( 03 Jul 2025 09:55 )             36.9     07-03    141  |  110[H]  |  54[H]  ----------------------------<  179[H]  5.9[H]   |  20[L]  |  5.22[H]    Ca    9.6      03 Jul 2025 09:55  Phos  4.1     07-03  Mg     1.9     07-03    TPro  7.5  /  Alb  4.4  /  TBili  0.3  /  DBili  x   /  AST  32  /  ALT  17  /  AlkPhos  92  07-01    PT/INR - ( 03 Jul 2025 09:55 )   PT: 10.5 sec;   INR: 0.91 ratio         PTT - ( 03 Jul 2025 09:55 )  PTT:32.0 sec      Urinalysis Basic - ( 03 Jul 2025 09:55 )    Color: x / Appearance: x / SG: x / pH: x  Gluc: 179 mg/dL / Ketone: x  / Bili: x / Urobili: x   Blood: x / Protein: x / Nitrite: x   Leuk Esterase: x / RBC: x / WBC x   Sq Epi: x / Non Sq Epi: x / Bacteria: x        Culture - Urine (collected 01 Jul 2025 21:18)  Source: Catheterized Catheterized  Final Report (03 Jul 2025 10:35):    <10,000 CFU/mL Normal Urogenital Rae          CAPILLARY BLOOD GLUCOSE      POCT Blood Glucose.: 194 mg/dL (03 Jul 2025 12:41)  POCT Blood Glucose.: 174 mg/dL (03 Jul 2025 08:56)  POCT Blood Glucose.: 175 mg/dL (02 Jul 2025 21:29)  POCT Blood Glucose.: 227 mg/dL (02 Jul 2025 17:17)    MEDICATIONS  (STANDING):  atorvastatin 10 milliGRAM(s) Oral at bedtime  atovaquone  Suspension 1500 milliGRAM(s) Oral daily  chlorhexidine 2% Cloths 1 Application(s) Topical daily  dextrose 5%. 1000 milliLiter(s) (100 mL/Hr) IV Continuous <Continuous>  dextrose 50% Injectable 25 Gram(s) IV Push once  dextrose 50% Injectable 12.5 Gram(s) IV Push once  dextrose 50% Injectable 25 Gram(s) IV Push once  glucagon  Injectable 1 milliGRAM(s) IntraMuscular once  insulin lispro (ADMELOG) corrective regimen sliding scale   SubCutaneous three times a day before meals  insulin lispro (ADMELOG) corrective regimen sliding scale   SubCutaneous at bedtime  metoprolol succinate ER 50 milliGRAM(s) Oral daily  mycophenolate mofetil 500 milliGRAM(s) Oral two times a day  NIFEdipine XL 60 milliGRAM(s) Oral daily  predniSONE   Tablet 5 milliGRAM(s) Oral daily  sodium bicarbonate 650 milliGRAM(s) Oral every 8 hours  tacrolimus ER Tablet (ENVARSUS XR) 6 milliGRAM(s) Oral daily  tamsulosin 0.4 milliGRAM(s) Oral at bedtime    MEDICATIONS  (PRN):  dextrose Oral Gel 15 Gram(s) Oral once PRN Blood Glucose LESS THAN 70 milliGRAM(s)/deciliter  sodium chloride 0.9% Bolus. 100 milliLiter(s) IV Bolus every 5 minutes PRN SBP LESS THAN or EQUAL to 90 mmHg

## 2025-07-03 NOTE — PROGRESS NOTE ADULT - SUBJECTIVE AND OBJECTIVE BOX
INTERVAL EVENTS: No acute events overnight.  SUBJECTIVE: Patient seen and examined at bedside with surgical team, patient without complaints. Denies fever, chills, CP, SOB nausea, vomiting, abdominal pain.    OBJECTIVE:    Vital Signs Last 24 Hrs  T(C): 37 (03 Jul 2025 05:08), Max: 37 (03 Jul 2025 05:08)  T(F): 98.6 (03 Jul 2025 05:08), Max: 98.6 (03 Jul 2025 05:08)  HR: 72 (03 Jul 2025 05:08) (71 - 72)  BP: 161/80 (03 Jul 2025 05:08) (145/67 - 161/80)  BP(mean): --  RR: 18 (03 Jul 2025 05:08) (18 - 18)  SpO2: 100% (03 Jul 2025 05:08) (100% - 100%)    Parameters below as of 03 Jul 2025 05:08  Patient On (Oxygen Delivery Method): room air    I&O's Detail    02 Jul 2025 07:01  -  03 Jul 2025 07:00  --------------------------------------------------------  IN:    Oral Fluid: 460 mL  Total IN: 460 mL    OUT:    Voided (mL): 1750 mL  Total OUT: 1750 mL    Total NET: -1290 mL      MEDICATIONS  (STANDING):  atorvastatin 10 milliGRAM(s) Oral at bedtime  atovaquone  Suspension 1500 milliGRAM(s) Oral daily  dextrose 5%. 1000 milliLiter(s) (100 mL/Hr) IV Continuous <Continuous>  dextrose 50% Injectable 25 Gram(s) IV Push once  dextrose 50% Injectable 12.5 Gram(s) IV Push once  dextrose 50% Injectable 25 Gram(s) IV Push once  glucagon  Injectable 1 milliGRAM(s) IntraMuscular once  insulin lispro (ADMELOG) corrective regimen sliding scale   SubCutaneous three times a day before meals  insulin lispro (ADMELOG) corrective regimen sliding scale   SubCutaneous at bedtime  metoprolol succinate ER 50 milliGRAM(s) Oral daily  mycophenolate mofetil 500 milliGRAM(s) Oral two times a day  NIFEdipine XL 60 milliGRAM(s) Oral daily  predniSONE   Tablet 5 milliGRAM(s) Oral daily  sodium bicarbonate 650 milliGRAM(s) Oral every 8 hours  tacrolimus ER Tablet (ENVARSUS XR) 6 milliGRAM(s) Oral daily  tamsulosin 0.4 milliGRAM(s) Oral at bedtime    MEDICATIONS  (PRN):  dextrose Oral Gel 15 Gram(s) Oral once PRN Blood Glucose LESS THAN 70 milliGRAM(s)/deciliter      PHYSICAL EXAM:  General: NAD, resting comfortably  HEENT: NC/AT, EOMI  Pulmonary: Normal respiratory effort on RA  Cardiovascular: Normal rate, normotensive  Abdominal: Soft, nondistended, nontender  Neuro: A/O x 3, CNs II-XII grossly intact, no focal deficits, moving extremities spontaneously  Extremities/Vascular: Warm, well perfused,  LUE edematous w/ strongly palpable thrill    LABS:                        11.2   5.99  )-----------( 168      ( 03 Jul 2025 09:55 )             36.9     07-03    141  |  110[H]  |  54[H]  ----------------------------<  179[H]  5.9[H]   |  20[L]  |  5.22[H]    Ca    9.6      03 Jul 2025 09:55  Phos  4.1     07-03  Mg     1.9     07-03    TPro  7.5  /  Alb  4.4  /  TBili  0.3  /  DBili  x   /  AST  32  /  ALT  17  /  AlkPhos  92  07-01    PT/INR - ( 03 Jul 2025 09:55 )   PT: 10.5 sec;   INR: 0.91 ratio         PTT - ( 03 Jul 2025 09:55 )  PTT:32.0 sec  LIVER FUNCTIONS - ( 01 Jul 2025 16:43 )  Alb: 4.4 g/dL / Pro: 7.5 g/dL / ALK PHOS: 92 U/L / ALT: 17 U/L / AST: 32 U/L / GGT: x           Urinalysis Basic - ( 03 Jul 2025 09:55 )    Color: x / Appearance: x / SG: x / pH: x  Gluc: 179 mg/dL / Ketone: x  / Bili: x / Urobili: x   Blood: x / Protein: x / Nitrite: x   Leuk Esterase: x / RBC: x / WBC x   Sq Epi: x / Non Sq Epi: x / Bacteria: x

## 2025-07-03 NOTE — PROGRESS NOTE ADULT - SUBJECTIVE AND OBJECTIVE BOX
[facility] DIVISION OF KIDNEY DISEASES AND HYPERTENSION -- FOLLOW UP NOTE  --------------------------------------------------------------------------------  Chief Complaint: s/p DDRT.     24 hour events/subjective: Pt. seen and examined at bedside earlier today. Agustin placed yesterday. Pt. without complaints. No fever, CP, SOB, LE edema, HA or dizziness during rounds today.         PAST HISTORY  --------------------------------------------------------------------------------  No significant changes to PMH, PSH, FHx, SHx, unless otherwise noted    ALLERGIES & MEDICATIONS  --------------------------------------------------------------------------------  Allergies    No Known Allergies    Intolerances      Standing Inpatient Medications  atorvastatin 10 milliGRAM(s) Oral at bedtime  atovaquone  Suspension 1500 milliGRAM(s) Oral daily  chlorhexidine 2% Cloths 1 Application(s) Topical daily  dextrose 5%. 1000 milliLiter(s) IV Continuous <Continuous>  dextrose 50% Injectable 25 Gram(s) IV Push once  dextrose 50% Injectable 12.5 Gram(s) IV Push once  dextrose 50% Injectable 25 Gram(s) IV Push once  glucagon  Injectable 1 milliGRAM(s) IntraMuscular once  insulin lispro (ADMELOG) corrective regimen sliding scale   SubCutaneous three times a day before meals  insulin lispro (ADMELOG) corrective regimen sliding scale   SubCutaneous at bedtime  metoprolol succinate ER 50 milliGRAM(s) Oral daily  mycophenolate mofetil 500 milliGRAM(s) Oral two times a day  NIFEdipine XL 60 milliGRAM(s) Oral daily  predniSONE   Tablet 5 milliGRAM(s) Oral daily  sodium bicarbonate 650 milliGRAM(s) Oral every 8 hours  tacrolimus ER Tablet (ENVARSUS XR) 6 milliGRAM(s) Oral daily  tamsulosin 0.4 milliGRAM(s) Oral at bedtime    PRN Inpatient Medications  dextrose Oral Gel 15 Gram(s) Oral once PRN  sodium chloride 0.9% Bolus. 100 milliLiter(s) IV Bolus every 5 minutes PRN      REVIEW OF SYSTEMS  --------------------------------------------------------------------------------  Gen: No fevers/chills  Respiratory: No dyspnea, cough,   CV: No chest pain, PND, orthopnea  GI: No abdominal pain, diarrhea, constipation, nausea, vomiting  Transplant: No pain  : No increased frequency, dysuria, hematuria   MSK: No edema  Neuro: No dizziness/lightheadedness    All other systems were reviewed and are negative, except as noted.    VITALS/PHYSICAL EXAM  --------------------------------------------------------------------------------  T(C): 36.5 (07-03-25 @ 12:02), Max: 37 (07-03-25 @ 05:08)  HR: 72 (07-03-25 @ 12:02) (71 - 72)  BP: 147/73 (07-03-25 @ 12:02) (145/67 - 161/80)  RR: 18 (07-03-25 @ 12:02) (18 - 18)  SpO2: 100% (07-03-25 @ 12:02) (100% - 100%)  Wt(kg): --    Weight (kg): 87.7 (07-01-25 @ 23:40)      07-02-25 @ 07:01  -  07-03-25 @ 07:00  --------------------------------------------------------  IN: 460 mL / OUT: 1750 mL / NET: -1290 mL      Physical Exam:  	Gen: NAD, able to speak in full sentences   	HEENT: PERRL, MMM   	Pulm: CTA B/L, no crackles   	CV: RRR, S1S2+  	Abd: +BS, soft          Transplant: No tenderness, swelling  	: No suprapubic tenderness  	MSK: no edema   	Psych: Normal affect and mood  	Skin: Warm          Access: YOMAIRA TRUJILLO, edema but +thrill.     LABS/STUDIES  --------------------------------------------------------------------------------              11.2   5.99  >-----------<  168      [07-03-25 @ 09:55]              36.9     141  |  110  |  54  ----------------------------<  179      [07-03-25 @ 09:55]  5.9   |  20  |  5.22        Ca     9.6     [07-03-25 @ 09:55]      Mg     1.9     [07-03-25 @ 09:55]      Phos  4.1     [07-03-25 @ 09:55]    TPro  7.5  /  Alb  4.4  /  TBili  0.3  /  DBili  x   /  AST  32  /  ALT  17  /  AlkPhos  92  [07-01-25 @ 16:43]    PT/INR: PT 10.5 , INR 0.91       [07-03-25 @ 09:55]  PTT: 32.0       [07-03-25 @ 09:55]      Creatinine Trend:  SCr 5.22 [07-03 @ 09:55]  SCr 5.25 [07-02 @ 06:46]  SCr 5.57 [07-01 @ 22:39]  SCr 5.66 [07-01 @ 16:43]    Tacrolimus (), Serum: 4.6 ng/mL (07-02 @ 06:46)      Urinalysis - [07-03-25 @ 09:55]      Color  / Appearance  / SG  / pH       Gluc 179 / Ketone   / Bili  / Urobili        Blood  / Protein  / Leuk Est  / Nitrite       RBC  / WBC  / Hyaline  / Gran  / Sq Epi  / Non Sq Epi  / Bacteria     Urine Creatinine 126      [07-01-25 @ 21:18]  Urine Protein 113      [07-01-25 @ 21:18]  Urine Sodium 58      [07-01-25 @ 21:18]  Urine Urea Nitrogen 503      [07-01-25 @ 21:18]  Urine Potassium 31      [07-01-25 @ 21:18]  Urine Osmolality 368      [07-01-25 @ 21:18]            IMAGING/RADIOLOGY: Reviewed.

## 2025-07-03 NOTE — PROGRESS NOTE ADULT - ASSESSMENT
73 yo M h/o HTN, T2DM, Hep C, ESRD and kidney, renal cancer, prostate cancer, kidney transplant recipient  in 2018  Sent in by nephrologist Dr. Bhakta for rise in Scr. Transplant nephrology consulted for CHELA on CKD in DDRT patient    1. s/p Hep C+ DDRT in 7/2018 ([facility]) currently admitted for CHELA on CKD  Transplant complicated by DGF, mild rejection treated with steroids, distal ureteral stricture requiring multiple procedures PCN, ureteral dilations, currently gets stent exchanges with Dr. Phillip.  Pts aaron creatinine 1.9 but had CHELA with pyelonephritis. Has had multiple episodes of CHELA. Last creatinine 3.97 in setting of likely UTI and moderate hydronephrosis on sono (4/2025). He was seen by urology and placed on flomax. Pt also has internal JJ stent being exchanged by Dr. Phillip in January 2025. Scr on admission is elevated at 5.57 on 7/1. SCr currently 5.22 s/p armenta placement.   UA with proteins, large Blood with RBC, and positive LE. UPCR 0.9  US Transplanted Kidney: Mild hydronephrosis with ureteral thickening, grossly unchanged from prior. Ureteral stent reidentified.  - s/p armenta catheter placement. UOP is 1.7L in 24 hours.   - Low potassium diet.   - IR consulted for possible PCN of transplant kidney today   - Hyperkalemic to 5.9, plan for iHD today via LUE AVF. Consent is in chart.   - Monitor labs and I/Os. Avoid nephrotoxins including, ACE/ARB, NSAIDs, contrast, etc. Dose medications as per eGFR.     2. Immunosuppression:   Simulect induction, ENV 6mg daily, target 5-7, MMF 250mg BID and pred 5.   Dose tacro per level. continue to monitor tacro levels daily - 30 mins before the morning dose.  c/w MMF and pred   PPX: switched from bactrim to mepron.     3. DM2 - on glimepiride and Rybelsus - controlled. ISS and monitor BGM  4. HTN - Cont home regimen  5. Oxalaturia - continue calcium carbonate 600mgs BID with meals, and continue sodium citrate 15 ml BID.    If you have any questions, please feel free to contact me:  Kathrine Luo MD PGY-5  Nephrology Chief Fellow  [employer] Teams (Preferred)/ Pager [phone]   (After 5pm or on weekends please page the on-call fellow)

## 2025-07-03 NOTE — PROGRESS NOTE ADULT - PROBLEM SELECTOR PLAN 1
CHELA on CKD 2/2 malpositioned ureteral stent  Giovanny Scr 1.9, last Scr in 4/2025 3.60  - PCN placement today 7/3 +/- stent retrieval per, hold ASA   - Nephrology following, to receive HD today given hyperkalemia via prior AVF  - bactrim changed to mepron for ppx  - sodium bicarb TID  - Monitor urine output, I/Os, Cr  - serial bladder scans for PVR  - Has AVF - vasc following - f/u LUE duplex eval AV fistula

## 2025-07-03 NOTE — PROGRESS NOTE ADULT - ASSESSMENT
75 yo M PMH ESRD s/p Kidney Transplant Recipient (2018) c/b hydronephrosis in transplant kidney managed with ureteral stent exchanges (last exchange per chart review 02/2025 with Dr. 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

## 2025-07-04 DIAGNOSIS — E87.8 OTHER DISORDERS OF ELECTROLYTE AND FLUID BALANCE, NOT ELSEWHERE CLASSIFIED: ICD-10-CM

## 2025-07-04 LAB
ANION GAP SERPL CALC-SCNC: 14 MMOL/L — SIGNIFICANT CHANGE UP (ref 5–17)
BUN SERPL-MCNC: 38 MG/DL — HIGH (ref 7–23)
CALCIUM SERPL-MCNC: 9 MG/DL — SIGNIFICANT CHANGE UP (ref 8.4–10.5)
CHLORIDE SERPL-SCNC: 100 MMOL/L — SIGNIFICANT CHANGE UP (ref 96–108)
CO2 SERPL-SCNC: 24 MMOL/L — SIGNIFICANT CHANGE UP (ref 22–31)
CREAT SERPL-MCNC: 4.63 MG/DL — HIGH (ref 0.5–1.3)
EGFR: 12 ML/MIN/1.73M2 — LOW
EGFR: 12 ML/MIN/1.73M2 — LOW
GLUCOSE BLDC GLUCOMTR-MCNC: 125 MG/DL — HIGH (ref 70–99)
GLUCOSE BLDC GLUCOMTR-MCNC: 168 MG/DL — HIGH (ref 70–99)
GLUCOSE BLDC GLUCOMTR-MCNC: 188 MG/DL — HIGH (ref 70–99)
GLUCOSE SERPL-MCNC: 131 MG/DL — HIGH (ref 70–99)
POTASSIUM SERPL-MCNC: 5.7 MMOL/L — HIGH (ref 3.5–5.3)
POTASSIUM SERPL-SCNC: 5.7 MMOL/L — HIGH (ref 3.5–5.3)
SODIUM SERPL-SCNC: 138 MMOL/L — SIGNIFICANT CHANGE UP (ref 135–145)

## 2025-07-04 PROCEDURE — 99232 SBSQ HOSP IP/OBS MODERATE 35: CPT

## 2025-07-04 RX ADMIN — INSULIN LISPRO 1: 100 INJECTION, SOLUTION INTRAVENOUS; SUBCUTANEOUS at 17:40

## 2025-07-04 RX ADMIN — PREDNISONE 5 MILLIGRAM(S): 20 TABLET ORAL at 05:29

## 2025-07-04 RX ADMIN — TAMSULOSIN HYDROCHLORIDE 0.4 MILLIGRAM(S): 0.4 CAPSULE ORAL at 21:50

## 2025-07-04 RX ADMIN — Medication 650 MILLIGRAM(S): at 21:50

## 2025-07-04 RX ADMIN — MYCOPHENOLATE MOFETIL 500 MILLIGRAM(S): 500 TABLET, FILM COATED ORAL at 05:28

## 2025-07-04 RX ADMIN — Medication 650 MILLIGRAM(S): at 05:28

## 2025-07-04 RX ADMIN — TACROLIMUS 6 MILLIGRAM(S): 0.5 CAPSULE ORAL at 17:41

## 2025-07-04 RX ADMIN — MYCOPHENOLATE MOFETIL 500 MILLIGRAM(S): 500 TABLET, FILM COATED ORAL at 17:41

## 2025-07-04 RX ADMIN — METOPROLOL SUCCINATE 50 MILLIGRAM(S): 50 TABLET, EXTENDED RELEASE ORAL at 05:28

## 2025-07-04 RX ADMIN — ATOVAQUONE 1500 MILLIGRAM(S): 750 SUSPENSION ORAL at 21:51

## 2025-07-04 RX ADMIN — Medication 1 APPLICATION(S): at 05:30

## 2025-07-04 RX ADMIN — ATORVASTATIN CALCIUM 10 MILLIGRAM(S): 80 TABLET, FILM COATED ORAL at 21:50

## 2025-07-04 RX ADMIN — Medication 60 MILLIGRAM(S): at 05:29

## 2025-07-04 NOTE — PROGRESS NOTE ADULT - SUBJECTIVE AND OBJECTIVE BOX
Patient with CHELA, hydronephrosis, ureter stricture    S: no new/acute symptoms    MEDICATIONS  (STANDING):  atorvastatin 10 milliGRAM(s) Oral at bedtime  atovaquone  Suspension 1500 milliGRAM(s) Oral daily  chlorhexidine 2% Cloths 1 Application(s) Topical daily  dextrose 5%. 1000 milliLiter(s) (100 mL/Hr) IV Continuous <Continuous>  dextrose 50% Injectable 25 Gram(s) IV Push once  dextrose 50% Injectable 12.5 Gram(s) IV Push once  dextrose 50% Injectable 25 Gram(s) IV Push once  glucagon  Injectable 1 milliGRAM(s) IntraMuscular once  insulin lispro (ADMELOG) corrective regimen sliding scale   SubCutaneous three times a day before meals  insulin lispro (ADMELOG) corrective regimen sliding scale   SubCutaneous at bedtime  metoprolol succinate ER 50 milliGRAM(s) Oral daily  mycophenolate mofetil 500 milliGRAM(s) Oral two times a day  NIFEdipine XL 60 milliGRAM(s) Oral daily  predniSONE   Tablet 5 milliGRAM(s) Oral daily  sodium bicarbonate 650 milliGRAM(s) Oral every 8 hours  tacrolimus ER Tablet (ENVARSUS XR) 6 milliGRAM(s) Oral daily  tamsulosin 0.4 milliGRAM(s) Oral at bedtime    MEDICATIONS  (PRN):  dextrose Oral Gel 15 Gram(s) Oral once PRN Blood Glucose LESS THAN 70 milliGRAM(s)/deciliter  sodium chloride 0.9% Bolus. 100 milliLiter(s) IV Bolus every 5 minutes PRN SBP LESS THAN or EQUAL to 90 mmHg    Vital Signs Last 24 Hrs  T(C): 36.3 (07-04-25 @ 12:47)  T(F): 97.3 (07-04-25 @ 12:47), Max: 98.1 (07-04-25 @ 11:53)  HR: 75 (07-04-25 @ 12:47) (75 - 78)  BP: 129/72 (07-04-25 @ 12:47)  BP(mean): 82 (07-04-25 @ 11:53) (82 - 82)  RR: 18 (07-04-25 @ 12:47) (16 - 18)  SpO2: 99% (07-04-25 @ 12:47) (97% - 99%)  Wt(kg): --    07-03 @ 07:01  -  07-04 @ 07:00  --------------------------------------------------------  IN: 600 mL / OUT: 2100 mL / NET: -1500 mL    On Exam:  Left UE AVF functioning  HENT: No acute signs  Chest/CV: No acute signs  Abd: No acute signs  Neuro: A, OX3  No acute focal signs    LABS/STUDIES  --------------------------------------------------------------------------------              11.2   5.99  >-----------<  168      [07-03-25 @ 09:55]              36.9     138  |  100  |  38  ----------------------------<  131      [07-04-25 @ 09:37]  5.7   |  24  |  4.63        Ca     9.0     [07-04-25 @ 09:37]      Mg     1.9     [07-03-25 @ 09:55]      Phos  4.1     [07-03-25 @ 09:55]      PT/INR: PT 10.5 , INR 0.91       [07-03-25 @ 09:55]  PTT: 32.0       [07-03-25 @ 09:55]      Creatinine Trend:  SCr 4.63 [07-04 @ 09:37]  SCr 5.22 [07-03 @ 09:55]  SCr 5.25 [07-02 @ 06:46]  SCr 5.57 [07-01 @ 22:39]  SCr 5.66 [07-01 @ 16:43]    Tacrolimus (), Serum: 7.8 ng/mL (07-03 @ 09:54)  Tacrolimus (), Serum: 4.6 ng/mL (07-02 @ 06:46)      Urine Creatinine 126      [07-01-25 @ 21:18]  Urine Protein 113      [07-01-25 @ 21:18]  Urine Sodium 58      [07-01-25 @ 21:18]  Urine Urea Nitrogen 503      [07-01-25 @ 21:18]  Urine Potassium 31      [07-01-25 @ 21:18]  Urine Osmolality 368      [07-01-25 @ 21:18]

## 2025-07-04 NOTE — PROGRESS NOTE ADULT - ASSESSMENT
74M PMH HTN, ESRD on HD via L A/V fistula (~[id number]) s/p Kidney Transplant Recipient (2018) c/b hydronephrosis in transplant kidney managed with ureteral stent exchanges (last exchange per chart review 02/2025 with Dr. 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 Vascular called to evaluate his LUE radiocephalic fistula, which has a strong thrill.     Recommendations:  - No acute surgical intervention  - Keep LUE protected with pink band  - LUE AV duplex reviewed.     Vascular Surgery  [id number] 74M PMH HTN, ESRD on HD via L A/V fistula (~[id number]) s/p Kidney Transplant Recipient (2018) c/b hydronephrosis in transplant kidney managed with ureteral stent exchanges (last exchange per chart review 02/2025 with Dr. 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 Vascular called to evaluate his LUE radiocephalic fistula, which has a strong thrill.     Recommendations:  - No acute surgical intervention  - Keep LUE protected with pink band  - LUE AV duplex reviewed.   - Can follow up as outpatient with Dr. Serrano.   - Vascular surgery will sign off at this time, please reconsult if needed.     Vascular Surgery  [id number]

## 2025-07-04 NOTE — PROGRESS NOTE ADULT - SUBJECTIVE AND OBJECTIVE BOX
INTERVAL EVENTS:   - Patient got dialysis via LUE AVF 7/3 with no complications.     SUBJECTIVE: Patient seen and examined at bedside with surgical team, patient without complaints. Denies fever, chills, CP, SOB nausea, vomiting, abdominal pain.    OBJECTIVE:    Vital Signs Last 24 Hrs  T(C): 36.6 (04 Jul 2025 04:54), Max: 36.6 (04 Jul 2025 04:54)  T(F): 97.8 (04 Jul 2025 04:54), Max: 97.8 (04 Jul 2025 04:54)  HR: 75 (04 Jul 2025 04:54) (72 - 78)  BP: 136/74 (04 Jul 2025 04:54) (136/74 - 153/82)  BP(mean): --  RR: 18 (04 Jul 2025 04:54) (16 - 18)  SpO2: 99% (04 Jul 2025 04:54) (98% - 100%)    Parameters below as of 04 Jul 2025 04:54  Patient On (Oxygen Delivery Method): room air    I&O's Detail    03 Jul 2025 07:01  -  04 Jul 2025 07:00  --------------------------------------------------------  IN:    Oral Fluid: 600 mL  Total IN: 600 mL    OUT:    Indwelling Catheter - Urethral (mL): 800 mL    Other (mL): 1000 mL    Voided (mL): 300 mL  Total OUT: 2100 mL    Total NET: -1500 mL      MEDICATIONS  (STANDING):  atorvastatin 10 milliGRAM(s) Oral at bedtime  atovaquone  Suspension 1500 milliGRAM(s) Oral daily  chlorhexidine 2% Cloths 1 Application(s) Topical daily  dextrose 5%. 1000 milliLiter(s) (100 mL/Hr) IV Continuous <Continuous>  dextrose 50% Injectable 25 Gram(s) IV Push once  dextrose 50% Injectable 12.5 Gram(s) IV Push once  dextrose 50% Injectable 25 Gram(s) IV Push once  glucagon  Injectable 1 milliGRAM(s) IntraMuscular once  insulin lispro (ADMELOG) corrective regimen sliding scale   SubCutaneous three times a day before meals  insulin lispro (ADMELOG) corrective regimen sliding scale   SubCutaneous at bedtime  metoprolol succinate ER 50 milliGRAM(s) Oral daily  mycophenolate mofetil 500 milliGRAM(s) Oral two times a day  NIFEdipine XL 60 milliGRAM(s) Oral daily  predniSONE   Tablet 5 milliGRAM(s) Oral daily  sodium bicarbonate 650 milliGRAM(s) Oral every 8 hours  tacrolimus ER Tablet (ENVARSUS XR) 6 milliGRAM(s) Oral daily  tamsulosin 0.4 milliGRAM(s) Oral at bedtime    MEDICATIONS  (PRN):  dextrose Oral Gel 15 Gram(s) Oral once PRN Blood Glucose LESS THAN 70 milliGRAM(s)/deciliter  sodium chloride 0.9% Bolus. 100 milliLiter(s) IV Bolus every 5 minutes PRN SBP LESS THAN or EQUAL to 90 mmHg      PHYSICAL EXAM:  General: NAD, resting comfortably  HEENT: NC/AT, EOMI  Pulmonary: Normal respiratory effort on RA  Cardiovascular: Normal rate, normotensive  Abdominal: Soft, nondistended, nontender  Neuro: A/O x 3, CNs II-XII grossly intact, no focal deficits, moving extremities spontaneously  Extremities/Vascular: Warm, well perfused,  LUE edematous w/ strongly palpable thrill    LABS:                        11.2   5.99  )-----------( 168      ( 03 Jul 2025 09:55 )             36.9     07-04    138  |  100  |  38[H]  ----------------------------<  131[H]  5.7[H]   |  24  |  4.63[H]    Ca    9.0      04 Jul 2025 09:37  Phos  4.1     07-03  Mg     1.9     07-03      PT/INR - ( 03 Jul 2025 09:55 )   PT: 10.5 sec;   INR: 0.91 ratio         PTT - ( 03 Jul 2025 09:55 )  PTT:32.0 sec    Urinalysis Basic - ( 04 Jul 2025 09:37 )    Color: x / Appearance: x / SG: x / pH: x  Gluc: 131 mg/dL / Ketone: x  / Bili: x / Urobili: x   Blood: x / Protein: x / Nitrite: x   Leuk Esterase: x / RBC: x / WBC x   Sq Epi: x / Non Sq Epi: x / Bacteria: x

## 2025-07-04 NOTE — PROGRESS NOTE ADULT - ASSESSMENT
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

## 2025-07-04 NOTE — PROGRESS NOTE ADULT - SUBJECTIVE AND OBJECTIVE BOX
Fernandez Duran MD  Division of Hospital Medicine  Available via MS Teams      Patient is a 74y old  Male who presents with a chief complaint of CHELA (02 Jul 2025 12:42)        SUBJECTIVE / OVERNIGHT EVENTS:  Overnight, no events.  No n/v/f/chills, cp, sob, abd pain.  no complaints    I&O's Summary    02 Jul 2025 07:01  -  03 Jul 2025 07:00  --------------------------------------------------------  IN: 460 mL / OUT: 1750 mL / NET: -1290 mL      Vital Signs Last 24 h  ICU Vital Signs Last 24 Hrs  T(C): 36.7 (04 Jul 2025 11:53), Max: 36.7 (04 Jul 2025 11:53)  T(F): 98.1 (04 Jul 2025 11:53), Max: 98.1 (04 Jul 2025 11:53)  HR: 76 (04 Jul 2025 11:53) (75 - 78)  BP: 118/64 (04 Jul 2025 11:53) (118/64 - 153/82)  BP(mean): 82 (04 Jul 2025 11:53) (82 - 82)  ABP: --  ABP(mean): --  RR: 18 (04 Jul 2025 11:53) (16 - 18)  SpO2: 97% (04 Jul 2025 11:53) (97% - 99%)    O2 Parameters below as of 04 Jul 2025 11:53  Patient On (Oxygen Delivery Method): room air                PHYSICAL EXAM:  GENERAL:  Well appearing, in NAD  HEAD:  NCAT  EYES: conjunctiva clear  NECK: Supple  CHEST/LUNG: CTA B/L. No w/r/r.  HEART: Reg rate. Normal S1, S2.  ABDOMEN: SNTND  EXTREMITIES:  2+ Peripheral Pulses, No clubbing, cyanosis, edema.  LUE AVF  PSYCH: AAOx3, appropriate affect    LABS:                                    11.2   5.99  )-----------( 168      ( 03 Jul 2025 09:55 )             36.9           07-04    138  |  100  |  38[H]  ----------------------------<  131[H]  5.7[H]   |  24  |  4.63[H]    Ca    9.0      04 Jul 2025 09:37  Phos  4.1     07-03  Mg     1.9     07-03      Color: x / Appearance: x / SG: x / pH: x  Gluc: 179 mg/dL / Ketone: x  / Bili: x / Urobili: x   Blood: x / Protein: x / Nitrite: x   Leuk Esterase: x / RBC: x / WBC x   Sq Epi: x / Non Sq Epi: x / Bacteria: x        Culture - Urine (collected 01 Jul 2025 21:18)  Source: Catheterized Catheterized  Final Report (03 Jul 2025 10:35):    <10,000 CFU/mL Normal Urogenital Rae          CAPILLARY BLOOD GLUCOSE      POCT Blood Glucose.: 194 mg/dL (03 Jul 2025 12:41)  POCT Blood Glucose.: 174 mg/dL (03 Jul 2025 08:56)  POCT Blood Glucose.: 175 mg/dL (02 Jul 2025 21:29)  POCT Blood Glucose.: 227 mg/dL (02 Jul 2025 17:17)    MEDICATIONS  (STANDING):  atorvastatin 10 milliGRAM(s) Oral at bedtime  atovaquone  Suspension 1500 milliGRAM(s) Oral daily  chlorhexidine 2% Cloths 1 Application(s) Topical daily  dextrose 5%. 1000 milliLiter(s) (100 mL/Hr) IV Continuous <Continuous>  dextrose 50% Injectable 25 Gram(s) IV Push once  dextrose 50% Injectable 12.5 Gram(s) IV Push once  dextrose 50% Injectable 25 Gram(s) IV Push once  glucagon  Injectable 1 milliGRAM(s) IntraMuscular once  insulin lispro (ADMELOG) corrective regimen sliding scale   SubCutaneous three times a day before meals  insulin lispro (ADMELOG) corrective regimen sliding scale   SubCutaneous at bedtime  metoprolol succinate ER 50 milliGRAM(s) Oral daily  mycophenolate mofetil 500 milliGRAM(s) Oral two times a day  NIFEdipine XL 60 milliGRAM(s) Oral daily  predniSONE   Tablet 5 milliGRAM(s) Oral daily  sodium bicarbonate 650 milliGRAM(s) Oral every 8 hours  tacrolimus ER Tablet (ENVARSUS XR) 6 milliGRAM(s) Oral daily  tamsulosin 0.4 milliGRAM(s) Oral at bedtime    MEDICATIONS  (PRN):  dextrose Oral Gel 15 Gram(s) Oral once PRN Blood Glucose LESS THAN 70 milliGRAM(s)/deciliter  sodium chloride 0.9% Bolus. 100 milliLiter(s) IV Bolus every 5 minutes PRN SBP LESS THAN or EQUAL to 90 mmHg

## 2025-07-04 NOTE — PROGRESS NOTE ADULT - PROBLEM SELECTOR PLAN 1
CHELA on CKD 2/2 malpositioned ureteral stent  Giovanny Scr 1.9, last Scr in 4/2025 3.60  - PCN placement +/- stent retrieval per IR, hold ASA   - Nephrology following  - bactrim changed to mepron for ppx  - sodium bicarb TID  - Monitor urine output, I/Os, Cr  - serial bladder scans for PVR  - Has AVF - vasc following - f/u LUE duplex reviwed.

## 2025-07-05 LAB
ANION GAP SERPL CALC-SCNC: 15 MMOL/L — SIGNIFICANT CHANGE UP (ref 5–17)
BUN SERPL-MCNC: 27 MG/DL — HIGH (ref 7–23)
CALCIUM SERPL-MCNC: 9.2 MG/DL — SIGNIFICANT CHANGE UP (ref 8.4–10.5)
CHLORIDE SERPL-SCNC: 98 MMOL/L — SIGNIFICANT CHANGE UP (ref 96–108)
CO2 SERPL-SCNC: 25 MMOL/L — SIGNIFICANT CHANGE UP (ref 22–31)
CREAT SERPL-MCNC: 4.97 MG/DL — HIGH (ref 0.5–1.3)
EGFR: 11 ML/MIN/1.73M2 — LOW
EGFR: 11 ML/MIN/1.73M2 — LOW
GLUCOSE BLDC GLUCOMTR-MCNC: 126 MG/DL — HIGH (ref 70–99)
GLUCOSE BLDC GLUCOMTR-MCNC: 137 MG/DL — HIGH (ref 70–99)
GLUCOSE BLDC GLUCOMTR-MCNC: 155 MG/DL — HIGH (ref 70–99)
GLUCOSE BLDC GLUCOMTR-MCNC: 187 MG/DL — HIGH (ref 70–99)
GLUCOSE SERPL-MCNC: 121 MG/DL — HIGH (ref 70–99)
POTASSIUM SERPL-MCNC: 4.3 MMOL/L — SIGNIFICANT CHANGE UP (ref 3.5–5.3)
POTASSIUM SERPL-SCNC: 4.3 MMOL/L — SIGNIFICANT CHANGE UP (ref 3.5–5.3)
SODIUM SERPL-SCNC: 138 MMOL/L — SIGNIFICANT CHANGE UP (ref 135–145)
TACROLIMUS SERPL-MCNC: 19.6 NG/ML — SIGNIFICANT CHANGE UP

## 2025-07-05 PROCEDURE — 90935 HEMODIALYSIS ONE EVALUATION: CPT

## 2025-07-05 PROCEDURE — 99232 SBSQ HOSP IP/OBS MODERATE 35: CPT

## 2025-07-05 RX ORDER — SENNA 187 MG
2 TABLET ORAL AT BEDTIME
Refills: 0 | Status: DISCONTINUED | OUTPATIENT
Start: 2025-07-05 | End: 2025-07-18

## 2025-07-05 RX ORDER — POLYETHYLENE GLYCOL 3350 17 G/17G
17 POWDER, FOR SOLUTION ORAL DAILY
Refills: 0 | Status: DISCONTINUED | OUTPATIENT
Start: 2025-07-05 | End: 2025-07-18

## 2025-07-05 RX ADMIN — ATORVASTATIN CALCIUM 10 MILLIGRAM(S): 80 TABLET, FILM COATED ORAL at 22:19

## 2025-07-05 RX ADMIN — POLYETHYLENE GLYCOL 3350 17 GRAM(S): 17 POWDER, FOR SOLUTION ORAL at 18:46

## 2025-07-05 RX ADMIN — Medication 650 MILLIGRAM(S): at 13:01

## 2025-07-05 RX ADMIN — INSULIN LISPRO 1: 100 INJECTION, SOLUTION INTRAVENOUS; SUBCUTANEOUS at 17:50

## 2025-07-05 RX ADMIN — PREDNISONE 5 MILLIGRAM(S): 20 TABLET ORAL at 06:05

## 2025-07-05 RX ADMIN — Medication 650 MILLIGRAM(S): at 06:05

## 2025-07-05 RX ADMIN — Medication 1 APPLICATION(S): at 06:12

## 2025-07-05 RX ADMIN — TACROLIMUS 6 MILLIGRAM(S): 0.5 CAPSULE ORAL at 09:11

## 2025-07-05 RX ADMIN — ATOVAQUONE 1500 MILLIGRAM(S): 750 SUSPENSION ORAL at 22:18

## 2025-07-05 RX ADMIN — MYCOPHENOLATE MOFETIL 500 MILLIGRAM(S): 500 TABLET, FILM COATED ORAL at 17:50

## 2025-07-05 RX ADMIN — MYCOPHENOLATE MOFETIL 500 MILLIGRAM(S): 500 TABLET, FILM COATED ORAL at 06:12

## 2025-07-05 RX ADMIN — TAMSULOSIN HYDROCHLORIDE 0.4 MILLIGRAM(S): 0.4 CAPSULE ORAL at 22:19

## 2025-07-05 RX ADMIN — Medication 650 MILLIGRAM(S): at 22:18

## 2025-07-05 RX ADMIN — Medication 2 TABLET(S): at 22:19

## 2025-07-05 NOTE — PROGRESS NOTE ADULT - SUBJECTIVE AND OBJECTIVE BOX
Fernandez Duran MD  Division of Hospital Medicine  Available via MS Teams      Patient is a 74y old  Male who presents with a chief complaint of CHELA (02 Jul 2025 12:42)        SUBJECTIVE / OVERNIGHT EVENTS:  Overnight, no events.  No n/v/f/chills, cp, sob, abd pain.  no complaints          Vital Signs Last 24 h  ICU Vital Signs Last 24 Hrs  T(C): 36.5 (05 Jul 2025 10:30), Max: 36.5 (05 Jul 2025 10:30)  T(F): 97.7 (05 Jul 2025 10:30), Max: 97.7 (05 Jul 2025 10:30)  HR: 96 (05 Jul 2025 10:30) (75 - 96)  BP: 143/73 (05 Jul 2025 10:30) (128/83 - 161/87)  BP(mean): --  ABP: --  ABP(mean): --  RR: 18 (05 Jul 2025 10:30) (18 - 19)  SpO2: 98% (05 Jul 2025 10:30) (98% - 100%)    O2 Parameters below as of 05 Jul 2025 10:30  Patient On (Oxygen Delivery Method): room air                        PHYSICAL EXAM:  GENERAL:  Well appearing, in NAD  HEAD:  NCAT  EYES: conjunctiva clear  NECK: Supple  CHEST/LUNG: CTA B/L. No w/r/r.  HEART: Reg rate. Normal S1, S2.  ABDOMEN: SNTND  EXTREMITIES:  2+ Peripheral Pulses, No clubbing, cyanosis, edema.  LUE AVF  PSYCH: AAOx3, appropriate affect    LABS:               Color: x / Appearance: x / SG: x / pH: x  Gluc: 179 mg/dL / Ketone: x  / Bili: x / Urobili: x   Blood: x / Protein: x / Nitrite: x   Leuk Esterase: x / RBC: x / WBC x   Sq Epi: x / Non Sq Epi: x / Bacteria: x        Culture - Urine (collected 01 Jul 2025 21:18)  Source: Catheterized Catheterized  Final Report (03 Jul 2025 10:35):    <10,000 CFU/mL Normal Urogenital Rae          CAPILLARY BLOOD GLUCOSE      POCT Blood Glucose.: 194 mg/dL (03 Jul 2025 12:41)  POCT Blood Glucose.: 174 mg/dL (03 Jul 2025 08:56)  POCT Blood Glucose.: 175 mg/dL (02 Jul 2025 21:29)  POCT Blood Glucose.: 227 mg/dL (02 Jul 2025 17:17)    MEDICATIONS  (STANDING):  atorvastatin 10 milliGRAM(s) Oral at bedtime  atovaquone  Suspension 1500 milliGRAM(s) Oral daily  chlorhexidine 2% Cloths 1 Application(s) Topical daily  dextrose 5%. 1000 milliLiter(s) (100 mL/Hr) IV Continuous <Continuous>  dextrose 50% Injectable 25 Gram(s) IV Push once  dextrose 50% Injectable 12.5 Gram(s) IV Push once  dextrose 50% Injectable 25 Gram(s) IV Push once  glucagon  Injectable 1 milliGRAM(s) IntraMuscular once  insulin lispro (ADMELOG) corrective regimen sliding scale   SubCutaneous three times a day before meals  insulin lispro (ADMELOG) corrective regimen sliding scale   SubCutaneous at bedtime  metoprolol succinate ER 50 milliGRAM(s) Oral daily  mycophenolate mofetil 500 milliGRAM(s) Oral two times a day  NIFEdipine XL 60 milliGRAM(s) Oral daily  predniSONE   Tablet 5 milliGRAM(s) Oral daily  sodium bicarbonate 650 milliGRAM(s) Oral every 8 hours  tacrolimus ER Tablet (ENVARSUS XR) 6 milliGRAM(s) Oral daily  tamsulosin 0.4 milliGRAM(s) Oral at bedtime    MEDICATIONS  (PRN):  dextrose Oral Gel 15 Gram(s) Oral once PRN Blood Glucose LESS THAN 70 milliGRAM(s)/deciliter  sodium chloride 0.9% Bolus. 100 milliLiter(s) IV Bolus every 5 minutes PRN SBP LESS THAN or EQUAL to 90 mmHg

## 2025-07-05 NOTE — PROGRESS NOTE ADULT - PROBLEM SELECTOR PLAN 1
CHELA on CKD 2/2 malpositioned ureteral stent  Giovanny Scr 1.9, last Scr in 4/2025 3.60  - PCN placement +/- stent retrieval per HUYEN amos on 7/7, hold ASA   - Nephrology following  - bactrim changed to mepron for ppx  - sodium bicarb TID  - Monitor urine output, I/Os, Cr  - serial bladder scans for PVR  - Has AVF - vasc following - f/u LUE duplex reviwed.

## 2025-07-06 LAB
ANION GAP SERPL CALC-SCNC: 18 MMOL/L — HIGH (ref 5–17)
BUN SERPL-MCNC: 26 MG/DL — HIGH (ref 7–23)
CALCIUM SERPL-MCNC: 9.3 MG/DL — SIGNIFICANT CHANGE UP (ref 8.4–10.5)
CHLORIDE SERPL-SCNC: 96 MMOL/L — SIGNIFICANT CHANGE UP (ref 96–108)
CO2 SERPL-SCNC: 23 MMOL/L — SIGNIFICANT CHANGE UP (ref 22–31)
CREAT SERPL-MCNC: 5.85 MG/DL — HIGH (ref 0.5–1.3)
EGFR: 9 ML/MIN/1.73M2 — LOW
EGFR: 9 ML/MIN/1.73M2 — LOW
GLUCOSE BLDC GLUCOMTR-MCNC: 141 MG/DL — HIGH (ref 70–99)
GLUCOSE BLDC GLUCOMTR-MCNC: 151 MG/DL — HIGH (ref 70–99)
GLUCOSE BLDC GLUCOMTR-MCNC: 161 MG/DL — HIGH (ref 70–99)
GLUCOSE BLDC GLUCOMTR-MCNC: 231 MG/DL — HIGH (ref 70–99)
GLUCOSE SERPL-MCNC: 113 MG/DL — HIGH (ref 70–99)
HCT VFR BLD CALC: 37.5 % — LOW (ref 39–50)
HGB BLD-MCNC: 11.3 G/DL — LOW (ref 13–17)
MCHC RBC-ENTMCNC: 27.1 PG — SIGNIFICANT CHANGE UP (ref 27–34)
MCHC RBC-ENTMCNC: 30.1 G/DL — LOW (ref 32–36)
MCV RBC AUTO: 89.9 FL — SIGNIFICANT CHANGE UP (ref 80–100)
NRBC # BLD AUTO: 0 K/UL — SIGNIFICANT CHANGE UP (ref 0–0)
NRBC # FLD: 0 K/UL — SIGNIFICANT CHANGE UP (ref 0–0)
NRBC BLD AUTO-RTO: 0 /100 WBCS — SIGNIFICANT CHANGE UP (ref 0–0)
PLATELET # BLD AUTO: 162 K/UL — SIGNIFICANT CHANGE UP (ref 150–400)
PMV BLD: 9.7 FL — SIGNIFICANT CHANGE UP (ref 7–13)
POTASSIUM SERPL-MCNC: 4.1 MMOL/L — SIGNIFICANT CHANGE UP (ref 3.5–5.3)
POTASSIUM SERPL-SCNC: 4.1 MMOL/L — SIGNIFICANT CHANGE UP (ref 3.5–5.3)
RBC # BLD: 4.17 M/UL — LOW (ref 4.2–5.8)
RBC # FLD: 15.1 % — HIGH (ref 10.3–14.5)
SODIUM SERPL-SCNC: 137 MMOL/L — SIGNIFICANT CHANGE UP (ref 135–145)
TACROLIMUS SERPL-MCNC: 14.4 NG/ML — SIGNIFICANT CHANGE UP
WBC # BLD: 5.66 K/UL — SIGNIFICANT CHANGE UP (ref 3.8–10.5)
WBC # FLD AUTO: 5.66 K/UL — SIGNIFICANT CHANGE UP (ref 3.8–10.5)

## 2025-07-06 PROCEDURE — 99232 SBSQ HOSP IP/OBS MODERATE 35: CPT

## 2025-07-06 RX ADMIN — INSULIN LISPRO 2: 100 INJECTION, SOLUTION INTRAVENOUS; SUBCUTANEOUS at 12:36

## 2025-07-06 RX ADMIN — Medication 1 APPLICATION(S): at 05:12

## 2025-07-06 RX ADMIN — MYCOPHENOLATE MOFETIL 500 MILLIGRAM(S): 500 TABLET, FILM COATED ORAL at 18:15

## 2025-07-06 RX ADMIN — Medication 2 TABLET(S): at 21:11

## 2025-07-06 RX ADMIN — Medication 650 MILLIGRAM(S): at 12:37

## 2025-07-06 RX ADMIN — TAMSULOSIN HYDROCHLORIDE 0.4 MILLIGRAM(S): 0.4 CAPSULE ORAL at 21:11

## 2025-07-06 RX ADMIN — INSULIN LISPRO 1: 100 INJECTION, SOLUTION INTRAVENOUS; SUBCUTANEOUS at 18:15

## 2025-07-06 RX ADMIN — Medication 650 MILLIGRAM(S): at 05:12

## 2025-07-06 RX ADMIN — ATORVASTATIN CALCIUM 10 MILLIGRAM(S): 80 TABLET, FILM COATED ORAL at 21:11

## 2025-07-06 RX ADMIN — ATOVAQUONE 1500 MILLIGRAM(S): 750 SUSPENSION ORAL at 21:11

## 2025-07-06 RX ADMIN — Medication 650 MILLIGRAM(S): at 21:11

## 2025-07-06 RX ADMIN — TACROLIMUS 6 MILLIGRAM(S): 0.5 CAPSULE ORAL at 09:46

## 2025-07-06 RX ADMIN — METOPROLOL SUCCINATE 50 MILLIGRAM(S): 50 TABLET, EXTENDED RELEASE ORAL at 09:45

## 2025-07-06 RX ADMIN — MYCOPHENOLATE MOFETIL 500 MILLIGRAM(S): 500 TABLET, FILM COATED ORAL at 05:12

## 2025-07-06 RX ADMIN — POLYETHYLENE GLYCOL 3350 17 GRAM(S): 17 POWDER, FOR SOLUTION ORAL at 11:47

## 2025-07-06 RX ADMIN — Medication 60 MILLIGRAM(S): at 09:45

## 2025-07-06 RX ADMIN — PREDNISONE 5 MILLIGRAM(S): 20 TABLET ORAL at 05:12

## 2025-07-06 NOTE — PROGRESS NOTE ADULT - SUBJECTIVE AND OBJECTIVE BOX
Fernandez Duran MD  Division of Hospital Medicine  Available via MS Teams      Patient is a 74y old  Male who presents with a chief complaint of CHELA (02 Jul 2025 12:42)        SUBJECTIVE / OVERNIGHT EVENTS:  Overnight, no events.  No n/v/f/chills, cp, sob, abd pain.  no complaints          Vital Signs Last 24 h.  Orthostatic VS  ICU Vital Signs Last 24 Hrs  T(C): 36.5 (06 Jul 2025 11:29), Max: 37.2 (05 Jul 2025 20:39)  T(F): 97.7 (06 Jul 2025 11:29), Max: 98.9 (05 Jul 2025 20:39)  HR: 79 (06 Jul 2025 11:29) (63 - 88)  BP: 131/75 (06 Jul 2025 11:29) (109/68 - 138/74)  BP(mean): 93 (06 Jul 2025 11:29) (82 - 93)  ABP: --  ABP(mean): --  RR: 18 (06 Jul 2025 11:29) (18 - 18)  SpO2: 98% (06 Jul 2025 11:29) (95% - 100%)    O2 Parameters below as of 06 Jul 2025 11:29  Patient On (Oxygen Delivery Method): room air                              PHYSICAL EXAM:  GENERAL:  Well appearing, in NAD  HEAD:  NCAT  EYES: conjunctiva clear  NECK: Supple  CHEST/LUNG: CTA B/L. No w/r/r.  HEART: Reg rate. Normal S1, S2.  ABDOMEN: SNTND  EXTREMITIES:  2+ Peripheral Pulses, No clubbing, cyanosis, edema.  LUE AVF  PSYCH: AAOx3, appropriate affect    LABS:               Color: x / Appearance: x / SG: x / pH: x  Gluc: 179 mg/dL / Ketone: x  / Bili: x / Urobili: x   Blood: x / Protein: x / Nitrite: x   Leuk Esterase: x / RBC: x / WBC x   Sq Epi: x / Non Sq Epi: x / Bacteria: x        Culture - Urine (collected 01 Jul 2025 21:18)  Source: Catheterized Catheterized  Final Report (03 Jul 2025 10:35):    <10,000 CFU/mL Normal Urogenital Rae          CAPILLARY BLOOD GLUCOSE      POCT Blood Glucose.: 194 mg/dL (03 Jul 2025 12:41)  POCT Blood Glucose.: 174 mg/dL (03 Jul 2025 08:56)  POCT Blood Glucose.: 175 mg/dL (02 Jul 2025 21:29)  POCT Blood Glucose.: 227 mg/dL (02 Jul 2025 17:17)    MEDICATIONS  (STANDING):  atorvastatin 10 milliGRAM(s) Oral at bedtime  atovaquone  Suspension 1500 milliGRAM(s) Oral daily  chlorhexidine 2% Cloths 1 Application(s) Topical daily  dextrose 5%. 1000 milliLiter(s) (100 mL/Hr) IV Continuous <Continuous>  dextrose 50% Injectable 25 Gram(s) IV Push once  dextrose 50% Injectable 12.5 Gram(s) IV Push once  dextrose 50% Injectable 25 Gram(s) IV Push once  glucagon  Injectable 1 milliGRAM(s) IntraMuscular once  insulin lispro (ADMELOG) corrective regimen sliding scale   SubCutaneous three times a day before meals  insulin lispro (ADMELOG) corrective regimen sliding scale   SubCutaneous at bedtime  metoprolol succinate ER 50 milliGRAM(s) Oral daily  mycophenolate mofetil 500 milliGRAM(s) Oral two times a day  NIFEdipine XL 60 milliGRAM(s) Oral daily  predniSONE   Tablet 5 milliGRAM(s) Oral daily  sodium bicarbonate 650 milliGRAM(s) Oral every 8 hours  tacrolimus ER Tablet (ENVARSUS XR) 6 milliGRAM(s) Oral daily  tamsulosin 0.4 milliGRAM(s) Oral at bedtime    MEDICATIONS  (PRN):  dextrose Oral Gel 15 Gram(s) Oral once PRN Blood Glucose LESS THAN 70 milliGRAM(s)/deciliter  sodium chloride 0.9% Bolus. 100 milliLiter(s) IV Bolus every 5 minutes PRN SBP LESS THAN or EQUAL to 90 mmHg

## 2025-07-06 NOTE — PROGRESS NOTE ADULT - PROBLEM SELECTOR PLAN 1
CHELA on CKD 2/2 malpositioned ureteral stent    - PCN placement +/- stent retrieval per HUYEN amos on 7/7, hold ASA   - Nephrology following  - bactrim changed to mepron for ppx  - sodium bicarb TID  - Monitor urine output, I/Os, Cr  - serial bladder scans for PVR  - Has AVF - vasc following - f/u LUE duplex reviwed.

## 2025-07-07 LAB
ANION GAP SERPL CALC-SCNC: 18 MMOL/L — HIGH (ref 5–17)
BLD GP AB SCN SERPL QL: NEGATIVE — SIGNIFICANT CHANGE UP
BUN SERPL-MCNC: 34 MG/DL — HIGH (ref 7–23)
CALCIUM SERPL-MCNC: 9.4 MG/DL — SIGNIFICANT CHANGE UP (ref 8.4–10.5)
CHLORIDE SERPL-SCNC: 97 MMOL/L — SIGNIFICANT CHANGE UP (ref 96–108)
CO2 SERPL-SCNC: 20 MMOL/L — LOW (ref 22–31)
CREAT SERPL-MCNC: 6.75 MG/DL — HIGH (ref 0.5–1.3)
EGFR: 8 ML/MIN/1.73M2 — LOW
EGFR: 8 ML/MIN/1.73M2 — LOW
GLUCOSE BLDC GLUCOMTR-MCNC: 124 MG/DL — HIGH (ref 70–99)
GLUCOSE BLDC GLUCOMTR-MCNC: 133 MG/DL — HIGH (ref 70–99)
GLUCOSE BLDC GLUCOMTR-MCNC: 163 MG/DL — HIGH (ref 70–99)
GLUCOSE BLDC GLUCOMTR-MCNC: 212 MG/DL — HIGH (ref 70–99)
GLUCOSE SERPL-MCNC: 136 MG/DL — HIGH (ref 70–99)
HCT VFR BLD CALC: 36.3 % — LOW (ref 39–50)
HGB BLD-MCNC: 11 G/DL — LOW (ref 13–17)
MAGNESIUM SERPL-MCNC: 2 MG/DL — SIGNIFICANT CHANGE UP (ref 1.6–2.6)
MCHC RBC-ENTMCNC: 26.9 PG — LOW (ref 27–34)
MCHC RBC-ENTMCNC: 30.3 G/DL — LOW (ref 32–36)
MCV RBC AUTO: 88.8 FL — SIGNIFICANT CHANGE UP (ref 80–100)
NRBC # BLD AUTO: 0 K/UL — SIGNIFICANT CHANGE UP (ref 0–0)
NRBC # FLD: 0 K/UL — SIGNIFICANT CHANGE UP (ref 0–0)
NRBC BLD AUTO-RTO: 0 /100 WBCS — SIGNIFICANT CHANGE UP (ref 0–0)
PHOSPHATE SERPL-MCNC: 5.4 MG/DL — HIGH (ref 2.5–4.5)
PLATELET # BLD AUTO: 174 K/UL — SIGNIFICANT CHANGE UP (ref 150–400)
PMV BLD: 10 FL — SIGNIFICANT CHANGE UP (ref 7–13)
POTASSIUM SERPL-MCNC: 4.2 MMOL/L — SIGNIFICANT CHANGE UP (ref 3.5–5.3)
POTASSIUM SERPL-SCNC: 4.2 MMOL/L — SIGNIFICANT CHANGE UP (ref 3.5–5.3)
RBC # BLD: 4.09 M/UL — LOW (ref 4.2–5.8)
RBC # FLD: 14.7 % — HIGH (ref 10.3–14.5)
RH IG SCN BLD-IMP: POSITIVE — SIGNIFICANT CHANGE UP
SODIUM SERPL-SCNC: 135 MMOL/L — SIGNIFICANT CHANGE UP (ref 135–145)
TACROLIMUS SERPL-MCNC: 14.3 NG/ML — SIGNIFICANT CHANGE UP
WBC # BLD: 6.08 K/UL — SIGNIFICANT CHANGE UP (ref 3.8–10.5)
WBC # FLD AUTO: 6.08 K/UL — SIGNIFICANT CHANGE UP (ref 3.8–10.5)

## 2025-07-07 PROCEDURE — 85610 PROTHROMBIN TIME: CPT

## 2025-07-07 PROCEDURE — 87340 HEPATITIS B SURFACE AG IA: CPT

## 2025-07-07 PROCEDURE — 50432 PLMT NEPHROSTOMY CATHETER: CPT | Mod: RT

## 2025-07-07 PROCEDURE — C1729: CPT

## 2025-07-07 PROCEDURE — 80048 BASIC METABOLIC PNL TOTAL CA: CPT

## 2025-07-07 PROCEDURE — 84100 ASSAY OF PHOSPHORUS: CPT

## 2025-07-07 PROCEDURE — 86900 BLOOD TYPING SEROLOGIC ABO: CPT

## 2025-07-07 PROCEDURE — 80053 COMPREHEN METABOLIC PANEL: CPT

## 2025-07-07 PROCEDURE — 82570 ASSAY OF URINE CREATININE: CPT

## 2025-07-07 PROCEDURE — 84295 ASSAY OF SERUM SODIUM: CPT

## 2025-07-07 PROCEDURE — 99233 SBSQ HOSP IP/OBS HIGH 50: CPT

## 2025-07-07 PROCEDURE — 74176 CT ABD & PELVIS W/O CONTRAST: CPT

## 2025-07-07 PROCEDURE — 87086 URINE CULTURE/COLONY COUNT: CPT

## 2025-07-07 PROCEDURE — 85014 HEMATOCRIT: CPT

## 2025-07-07 PROCEDURE — 93990 DOPPLER FLOW TESTING: CPT

## 2025-07-07 PROCEDURE — 76776 US EXAM K TRANSPL W/DOPPLER: CPT

## 2025-07-07 PROCEDURE — 82330 ASSAY OF CALCIUM: CPT

## 2025-07-07 PROCEDURE — 86850 RBC ANTIBODY SCREEN: CPT

## 2025-07-07 PROCEDURE — 85027 COMPLETE CBC AUTOMATED: CPT

## 2025-07-07 PROCEDURE — 83935 ASSAY OF URINE OSMOLALITY: CPT

## 2025-07-07 PROCEDURE — 86901 BLOOD TYPING SEROLOGIC RH(D): CPT

## 2025-07-07 PROCEDURE — 99232 SBSQ HOSP IP/OBS MODERATE 35: CPT | Mod: GC

## 2025-07-07 PROCEDURE — 82435 ASSAY OF BLOOD CHLORIDE: CPT

## 2025-07-07 PROCEDURE — 82803 BLOOD GASES ANY COMBINATION: CPT

## 2025-07-07 PROCEDURE — 84540 ASSAY OF URINE/UREA-N: CPT

## 2025-07-07 PROCEDURE — 94640 AIRWAY INHALATION TREATMENT: CPT

## 2025-07-07 PROCEDURE — 85730 THROMBOPLASTIN TIME PARTIAL: CPT

## 2025-07-07 PROCEDURE — 82962 GLUCOSE BLOOD TEST: CPT

## 2025-07-07 PROCEDURE — 84300 ASSAY OF URINE SODIUM: CPT

## 2025-07-07 PROCEDURE — 84156 ASSAY OF PROTEIN URINE: CPT

## 2025-07-07 PROCEDURE — 83605 ASSAY OF LACTIC ACID: CPT

## 2025-07-07 PROCEDURE — 83735 ASSAY OF MAGNESIUM: CPT

## 2025-07-07 PROCEDURE — 84133 ASSAY OF URINE POTASSIUM: CPT

## 2025-07-07 PROCEDURE — 83036 HEMOGLOBIN GLYCOSYLATED A1C: CPT

## 2025-07-07 PROCEDURE — 80197 ASSAY OF TACROLIMUS: CPT

## 2025-07-07 PROCEDURE — C1894: CPT

## 2025-07-07 PROCEDURE — 93005 ELECTROCARDIOGRAM TRACING: CPT

## 2025-07-07 PROCEDURE — 82947 ASSAY GLUCOSE BLOOD QUANT: CPT

## 2025-07-07 PROCEDURE — 81001 URINALYSIS AUTO W/SCOPE: CPT

## 2025-07-07 PROCEDURE — 84132 ASSAY OF SERUM POTASSIUM: CPT

## 2025-07-07 PROCEDURE — 85018 HEMOGLOBIN: CPT

## 2025-07-07 PROCEDURE — C1769: CPT

## 2025-07-07 PROCEDURE — 85025 COMPLETE CBC W/AUTO DIFF WBC: CPT

## 2025-07-07 RX ORDER — FENTANYL CITRATE-0.9 % NACL/PF 100MCG/2ML
50 SYRINGE (ML) INTRAVENOUS ONCE
Refills: 0 | Status: DISCONTINUED | OUTPATIENT
Start: 2025-07-07 | End: 2025-07-08

## 2025-07-07 RX ORDER — ONDANSETRON HCL/PF 4 MG/2 ML
4 VIAL (ML) INJECTION ONCE
Refills: 0 | Status: DISCONTINUED | OUTPATIENT
Start: 2025-07-07 | End: 2025-07-18

## 2025-07-07 RX ORDER — HYDROMORPHONE/SOD CHLOR,ISO/PF 2 MG/10 ML
0.5 SYRINGE (ML) INJECTION ONCE
Refills: 0 | Status: DISCONTINUED | OUTPATIENT
Start: 2025-07-07 | End: 2025-07-07

## 2025-07-07 RX ADMIN — POLYETHYLENE GLYCOL 3350 17 GRAM(S): 17 POWDER, FOR SOLUTION ORAL at 11:47

## 2025-07-07 RX ADMIN — Medication 1 APPLICATION(S): at 06:52

## 2025-07-07 RX ADMIN — METOPROLOL SUCCINATE 50 MILLIGRAM(S): 50 TABLET, EXTENDED RELEASE ORAL at 06:13

## 2025-07-07 RX ADMIN — Medication 650 MILLIGRAM(S): at 06:13

## 2025-07-07 RX ADMIN — TAMSULOSIN HYDROCHLORIDE 0.4 MILLIGRAM(S): 0.4 CAPSULE ORAL at 22:02

## 2025-07-07 RX ADMIN — TACROLIMUS 6 MILLIGRAM(S): 0.5 CAPSULE ORAL at 11:47

## 2025-07-07 RX ADMIN — Medication 0.5 MILLIGRAM(S): at 18:31

## 2025-07-07 RX ADMIN — Medication 60 MILLIGRAM(S): at 06:13

## 2025-07-07 RX ADMIN — ATOVAQUONE 1500 MILLIGRAM(S): 750 SUSPENSION ORAL at 22:06

## 2025-07-07 RX ADMIN — INSULIN LISPRO 2: 100 INJECTION, SOLUTION INTRAVENOUS; SUBCUTANEOUS at 13:00

## 2025-07-07 RX ADMIN — MYCOPHENOLATE MOFETIL 500 MILLIGRAM(S): 500 TABLET, FILM COATED ORAL at 06:13

## 2025-07-07 RX ADMIN — Medication 650 MILLIGRAM(S): at 22:02

## 2025-07-07 RX ADMIN — Medication 2 TABLET(S): at 22:02

## 2025-07-07 RX ADMIN — PREDNISONE 5 MILLIGRAM(S): 20 TABLET ORAL at 06:13

## 2025-07-07 RX ADMIN — MYCOPHENOLATE MOFETIL 500 MILLIGRAM(S): 500 TABLET, FILM COATED ORAL at 17:31

## 2025-07-07 RX ADMIN — ATORVASTATIN CALCIUM 10 MILLIGRAM(S): 80 TABLET, FILM COATED ORAL at 22:02

## 2025-07-07 RX ADMIN — Medication 0.5 MILLIGRAM(S): at 17:31

## 2025-07-07 RX ADMIN — INSULIN LISPRO 1: 100 INJECTION, SOLUTION INTRAVENOUS; SUBCUTANEOUS at 17:33

## 2025-07-07 RX ADMIN — Medication 0.5 MILLIGRAM(S): at 10:39

## 2025-07-07 NOTE — PROCEDURE NOTE - PLAN
1 hr recovery  Return to IR in 1 month as an outpatient for stent retrieval - 1 hr recovery  - Please flush with 5cc daily  - Return to IR in 1 month as an outpatient for stent retrieval (patient can call Clinic/outpatient booking: [facility] [phone])

## 2025-07-07 NOTE — PRE PROCEDURE NOTE - PRE PROCEDURE EVALUATION
Interventional Radiology    HPI: 73 yo M PMH ESRD s/p Kidney Transplant Recipient (2018) c/b hydronephrosis in transplant kidney managed with ureteral stent exchanges (last exchange per chart review 02/2025 with Dr. 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    Allergies: No Known Allergies    Medications (Abx/Cardiac/Anticoagulation/Blood Products)    atovaquone  Suspension: 1500 milliGRAM(s) Oral (07-06 @ 21:11)  metoprolol succinate ER: 50 milliGRAM(s) Oral (07-07 @ 06:13)  NIFEdipine XL: 60 milliGRAM(s) Oral (07-07 @ 06:13)    Data:    T(C): 37  HR: 68  BP: 114/61  RR: 18  SpO2: 99%    Exam  General: No acute distress  Chest: Non labored breathing    -WBC 6.08 / HgB 11.0 / Hct 36.3 / Plt 174  -Na 135 / Cl 97 / BUN 34 / Glucose 136  -K 4.2 / CO2 20 / Cr 6.75  -INR0.91    Imaging: Reviewed    Plan: 74y Male presents for transplant nephrostomy tube placement and removal of transplant stent.  -Risks/Benefits/alternatives explained with the patient and/or healthcare proxy and witnessed informed consent obtained.    Interventional Radiology    HPI: 75 yo M PMH ESRD s/p Kidney Transplant Recipient (2018) c/b hydronephrosis in transplant kidney managed with ureteral stent exchanges (last exchange per chart review 02/2025 with Dr. 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    Allergies: No Known Allergies    Medications (Abx/Cardiac/Anticoagulation/Blood Products)    atovaquone  Suspension: 1500 milliGRAM(s) Oral (07-06 @ 21:11)  metoprolol succinate ER: 50 milliGRAM(s) Oral (07-07 @ 06:13)  NIFEdipine XL: 60 milliGRAM(s) Oral (07-07 @ 06:13)    Data:    T(C): 37  HR: 68  BP: 114/61  RR: 18  SpO2: 99%    Exam  General: No acute distress  Chest: Non labored breathing    -WBC 6.08 / HgB 11.0 / Hct 36.3 / Plt 174  -Na 135 / Cl 97 / BUN 34 / Glucose 136  -K 4.2 / CO2 20 / Cr 6.75  -INR0.91    Imaging: Reviewed    Plan: 74y Male presents for transplant nephrostomy tube placement.  -Risks/Benefits/alternatives explained with the patient and/or healthcare proxy and witnessed informed consent obtained.

## 2025-07-07 NOTE — PROGRESS NOTE ADULT - PROBLEM SELECTOR PLAN 1
CHELA on CKD 2/2 malpositioned ureteral stent  - S/p RNT placement 7/7  - Nephrology following  - bactrim changed to mepron for ppx  - sodium bicarb TID  - Monitor urine output, I/Os, Cr  - serial bladder scans for PVR  - Has AVF - vasc following - f/u LUE duplex reviwed.

## 2025-07-07 NOTE — PROGRESS NOTE ADULT - SUBJECTIVE AND OBJECTIVE BOX
[facility] DIVISION OF KIDNEY DISEASES AND HYPERTENSION -- FOLLOW UP NOTE  --------------------------------------------------------------------------------  Chief Complaint: s/p DDRT, CHELA on CKD    24 hour events/subjective: Pt. seen and examined at bedside earlier today. Complains of pain at PCN site, which he received this morning. He still has armenta catheter in place. No fever, CP, SOB, LE edema, HA or dizziness during rounds today.         PAST HISTORY  --------------------------------------------------------------------------------  No significant changes to PMH, PSH, FHx, SHx, unless otherwise noted    ALLERGIES & MEDICATIONS  --------------------------------------------------------------------------------  Allergies    No Known Allergies    Intolerances      Standing Inpatient Medications  atorvastatin 10 milliGRAM(s) Oral at bedtime  atovaquone  Suspension 1500 milliGRAM(s) Oral daily  chlorhexidine 2% Cloths 1 Application(s) Topical daily  dextrose 5%. 1000 milliLiter(s) IV Continuous <Continuous>  dextrose 50% Injectable 25 Gram(s) IV Push once  dextrose 50% Injectable 12.5 Gram(s) IV Push once  dextrose 50% Injectable 25 Gram(s) IV Push once  glucagon  Injectable 1 milliGRAM(s) IntraMuscular once  insulin lispro (ADMELOG) corrective regimen sliding scale   SubCutaneous three times a day before meals  insulin lispro (ADMELOG) corrective regimen sliding scale   SubCutaneous at bedtime  metoprolol succinate ER 50 milliGRAM(s) Oral daily  mycophenolate mofetil 500 milliGRAM(s) Oral two times a day  NIFEdipine XL 60 milliGRAM(s) Oral daily  polyethylene glycol 3350 17 Gram(s) Oral daily  predniSONE   Tablet 5 milliGRAM(s) Oral daily  senna 2 Tablet(s) Oral at bedtime  sodium bicarbonate 650 milliGRAM(s) Oral every 8 hours  tacrolimus ER Tablet (ENVARSUS XR) 6 milliGRAM(s) Oral daily  tamsulosin 0.4 milliGRAM(s) Oral at bedtime    PRN Inpatient Medications  dextrose Oral Gel 15 Gram(s) Oral once PRN  fentaNYL    Injectable 50 MICROGram(s) IV Push once PRN  ondansetron Injectable 4 milliGRAM(s) IV Push once PRN  sodium chloride 0.9% Bolus. 100 milliLiter(s) IV Bolus every 5 minutes PRN      REVIEW OF SYSTEMS  --------------------------------------------------------------------------------  Gen: No fevers/chills  Respiratory: No dyspnea, cough,   CV: No chest pain, PND, orthopnea  GI: No abdominal pain, diarrhea, constipation, nausea, vomiting  Transplant: No pain  : +PCN site pain  MSK: No edema  Neuro: No dizziness/lightheadedness    All other systems were reviewed and are negative, except as noted.    VITALS/PHYSICAL EXAM  --------------------------------------------------------------------------------  T(C): 36.5 (07-07-25 @ 11:35), Max: 37 (07-07-25 @ 05:01)  HR: 80 (07-07-25 @ 11:35) (68 - 85)  BP: 120/64 (07-07-25 @ 11:35) (103/55 - 143/70)  RR: 18 (07-07-25 @ 11:35) (17 - 25)  SpO2: 97% (07-07-25 @ 11:35) (96% - 100%)  Wt(kg): --  Height (cm): 177.8 (07-07-25 @ 08:21)  Weight (kg): 87.7 (07-07-25 @ 08:21)  BMI (kg/m2): 27.7 (07-07-25 @ 08:21)  BSA (m2): 2.06 (07-07-25 @ 08:21)      07-06-25 @ 07:01  -  07-07-25 @ 07:00  --------------------------------------------------------  IN: 0 mL / OUT: 350 mL / NET: -350 mL    Physical Exam:  	Gen: NAD, able to speak in full sentences   	HEENT: PERRL, MMM   	Pulm: CTA B/L, no crackles   	CV: RRR, S1S2+  	Abd: +BS, soft          Transplant: No tenderness, swelling  	: +armenta catheter, +R PCN drain  	MSK: +LUE edema  	Psych: Normal affect and mood  	Skin: Warm          Access: LUE AVF, edema but +thrill.    LABS/STUDIES  --------------------------------------------------------------------------------              11.0   6.08  >-----------<  174      [07-07-25 @ 06:38]              36.3     135  |  97  |  34  ----------------------------<  136      [07-07-25 @ 06:39]  4.2   |  20  |  6.75        Ca     9.4     [07-07-25 @ 06:39]      Mg     2.0     [07-07-25 @ 06:39]      Phos  5.4     [07-07-25 @ 06:39]            Creatinine Trend:  SCr 6.75 [07-07 @ 06:39]  SCr 5.85 [07-06 @ 06:47]  SCr 4.97 [07-05 @ 06:37]  SCr 4.63 [07-04 @ 09:37]  SCr 5.22 [07-03 @ 09:55]    Tacrolimus (), Serum: 14.3 ng/mL (07-07 @ 06:48)  Tacrolimus (), Serum: 14.4 ng/mL (07-06 @ 06:47)  Tacrolimus (), Serum: 19.6 ng/mL (07-05 @ 07:10)  Tacrolimus (), Serum: 7.8 ng/mL (07-03 @ 09:54)            Urinalysis - [07-07-25 @ 06:39]      Color  / Appearance  / SG  / pH       Gluc 136 / Ketone   / Bili  / Urobili        Blood  / Protein  / Leuk Est  / Nitrite       RBC  / WBC  / Hyaline  / Gran  / Sq Epi  / Non Sq Epi  / Bacteria     Urine Creatinine 126      [07-01-25 @ 21:18]  Urine Protein 113      [07-01-25 @ 21:18]  Urine Sodium 58      [07-01-25 @ 21:18]  Urine Urea Nitrogen 503      [07-01-25 @ 21:18]  Urine Potassium 31      [07-01-25 @ 21:18]  Urine Osmolality 368      [07-01-25 @ 21:18]      HBsAg Nonreact      [07-03-25 @ 17:35]        IMAGING/RADIOLOGY: Reviewed.

## 2025-07-07 NOTE — PRE PROCEDURE NOTE - GENERAL PROCEDURE NAME
Transplant nephrostomy tube placement and removal of transplant stent Transplant nephrostomy tube placement

## 2025-07-07 NOTE — PROGRESS NOTE ADULT - ASSESSMENT
73 yo M h/o HTN, T2DM, Hep C, ESRD and kidney, renal cancer, prostate cancer, kidney transplant recipient  in 2018  Sent in by nephrologist Dr. Bhakta for rise in Scr. Transplant nephrology consulted for CHELA on CKD in DDRT patient    1. s/p Hep C+ DDRT in 7/2018 ([facility]) currently admitted for CHELA on CKD  Transplant complicated by DGF, mild rejection treated with steroids, distal ureteral stricture requiring multiple procedures PCN, ureteral dilations, currently gets stent exchanges with Dr. Phillip.  Pts aaron creatinine 1.9 but had CHELA with pyelonephritis. Has had multiple episodes of CHELA. Last creatinine 3.97 in setting of likely UTI and moderate hydronephrosis on sono (4/2025). He was seen by urology and placed on flomax. Pt also has internal JJ stent being exchanged by Dr. Phillip in January 2025. Scr on admission is elevated at 5.57 on 7/1. SCr currently 5.22 s/p armenta placement.   UA with proteins, large Blood with RBC, and positive LE. UPCR 0.9  US Transplanted Kidney: Mild hydronephrosis with ureteral thickening, grossly unchanged from prior. Ureteral stent reidentified.  - s/p armenta catheter placement. UOP is decreased, 350 cc in 24 hours.   - s/p IR placement of PCN 7/7/25. Monitor output  - Repeat transplant renal tomorrow 7/8 to eval hydro   - Plan for iHD today 7/7/25.    - Monitor labs and I/Os. Avoid nephrotoxins including, ACE/ARB, NSAIDs, contrast, etc. Dose medications as per eGFR.     2. Immunosuppression:   Simulect induction, ENV 6mg daily, target 5-7, MMF 250mg BID and pred 5.   Dose tacro per level. continue to monitor tacro levels daily - 30 mins before the morning dose.  - Current level supratherapeutic at 14. Hold Tacrolimus dose for tomorrow.   - c/w MMF and pred   PPX: switched from bactrim to mepron.     3. DM2 - on glimepiride and Rybelsus - controlled. ISS and monitor BGM  4. HTN - Cont home regimen  5. Oxalaturia - continue calcium carbonate 600mgs BID with meals, and continue sodium citrate 15 ml BID.    If you have any questions, please feel free to contact me:  Kathrine Luo MD PGY-5  Nephrology Chief Fellow  [employer] Teams (Preferred)/ Pager [phone]   (After 5pm or on weekends please page the on-call fellow)

## 2025-07-07 NOTE — PROGRESS NOTE ADULT - SUBJECTIVE AND OBJECTIVE BOX
Patient is a 74y old  Male who presents with a chief complaint of martita (06 Jul 2025 13:40)      SUBJECTIVE / OVERNIGHT EVENTS: pt reports some soreness around R NT tube, no cp, sob, had bm     MEDICATIONS  (STANDING):  atorvastatin 10 milliGRAM(s) Oral at bedtime  atovaquone  Suspension 1500 milliGRAM(s) Oral daily  chlorhexidine 2% Cloths 1 Application(s) Topical daily  dextrose 5%. 1000 milliLiter(s) (100 mL/Hr) IV Continuous <Continuous>  dextrose 50% Injectable 25 Gram(s) IV Push once  dextrose 50% Injectable 12.5 Gram(s) IV Push once  dextrose 50% Injectable 25 Gram(s) IV Push once  glucagon  Injectable 1 milliGRAM(s) IntraMuscular once  insulin lispro (ADMELOG) corrective regimen sliding scale   SubCutaneous three times a day before meals  insulin lispro (ADMELOG) corrective regimen sliding scale   SubCutaneous at bedtime  metoprolol succinate ER 50 milliGRAM(s) Oral daily  mycophenolate mofetil 500 milliGRAM(s) Oral two times a day  NIFEdipine XL 60 milliGRAM(s) Oral daily  polyethylene glycol 3350 17 Gram(s) Oral daily  predniSONE   Tablet 5 milliGRAM(s) Oral daily  senna 2 Tablet(s) Oral at bedtime  sodium bicarbonate 650 milliGRAM(s) Oral every 8 hours  tamsulosin 0.4 milliGRAM(s) Oral at bedtime    MEDICATIONS  (PRN):  dextrose Oral Gel 15 Gram(s) Oral once PRN Blood Glucose LESS THAN 70 milliGRAM(s)/deciliter  fentaNYL    Injectable 50 MICROGram(s) IV Push once PRN Severe Pain (7 - 10)  ondansetron Injectable 4 milliGRAM(s) IV Push once PRN Nausea and/or Vomiting  sodium chloride 0.9% Bolus. 100 milliLiter(s) IV Bolus every 5 minutes PRN SBP LESS THAN or EQUAL to 90 mmHg        CAPILLARY BLOOD GLUCOSE      POCT Blood Glucose.: 212 mg/dL (07 Jul 2025 12:40)  POCT Blood Glucose.: 161 mg/dL (06 Jul 2025 21:20)  POCT Blood Glucose.: 151 mg/dL (06 Jul 2025 17:33)    I&O's Summary    06 Jul 2025 07:01  -  07 Jul 2025 07:00  --------------------------------------------------------  IN: 0 mL / OUT: 350 mL / NET: -350 mL        PHYSICAL EXAM:  GENERAL: NAD, well-developed  HEAD:  Atraumatic, Normocephalic  EYES:  conjunctiva and sclera clear  NECK:  No JVD  CHEST/LUNG: Clear to auscultation bilaterally; No wheeze  HEART: Regular rate and rhythm; No murmurs, rubs, or gallops  ABDOMEN: Soft, Nontender, Nondistended; Bowel sounds present +RNT   EXTREMITIES:  2+ Peripheral Pulses, No clubbing, cyanosis, or edema  PSYCH: AAOx3    LABS:                        11.0   6.08  )-----------( 174      ( 07 Jul 2025 06:38 )             36.3     07-07    135  |  97  |  34[H]  ----------------------------<  136[H]  4.2   |  20[L]  |  6.75[H]    Ca    9.4      07 Jul 2025 06:39  Phos  5.4     07-07  Mg     2.0     07-07            Urinalysis Basic - ( 07 Jul 2025 06:39 )    Color: x / Appearance: x / SG: x / pH: x  Gluc: 136 mg/dL / Ketone: x  / Bili: x / Urobili: x   Blood: x / Protein: x / Nitrite: x   Leuk Esterase: x / RBC: x / WBC x   Sq Epi: x / Non Sq Epi: x / Bacteria: x        RADIOLOGY & ADDITIONAL TESTS:    Imaging Personally Reviewed:    Consultant(s) Notes Reviewed:      Care Discussed with Consultants/Other Providers:

## 2025-07-08 ENCOUNTER — TRANSCRIPTION ENCOUNTER (OUTPATIENT)
Age: 75
End: 2025-07-08

## 2025-07-08 LAB
ANION GAP SERPL CALC-SCNC: 14 MMOL/L — SIGNIFICANT CHANGE UP (ref 5–17)
APPEARANCE UR: ABNORMAL
BACTERIA # UR AUTO: ABNORMAL /HPF
BILIRUB UR-MCNC: NEGATIVE — SIGNIFICANT CHANGE UP
BUN SERPL-MCNC: 26 MG/DL — HIGH (ref 7–23)
CALCIUM SERPL-MCNC: 9.2 MG/DL — SIGNIFICANT CHANGE UP (ref 8.4–10.5)
CAST: 57 /LPF — HIGH (ref 0–4)
CHLORIDE SERPL-SCNC: 95 MMOL/L — LOW (ref 96–108)
CO2 SERPL-SCNC: 26 MMOL/L — SIGNIFICANT CHANGE UP (ref 22–31)
COLOR SPEC: ABNORMAL
CREAT SERPL-MCNC: 5.94 MG/DL — HIGH (ref 0.5–1.3)
DIFF PNL FLD: ABNORMAL
EGFR: 9 ML/MIN/1.73M2 — LOW
EGFR: 9 ML/MIN/1.73M2 — LOW
GLUCOSE BLDC GLUCOMTR-MCNC: 140 MG/DL — HIGH (ref 70–99)
GLUCOSE BLDC GLUCOMTR-MCNC: 156 MG/DL — HIGH (ref 70–99)
GLUCOSE BLDC GLUCOMTR-MCNC: 220 MG/DL — HIGH (ref 70–99)
GLUCOSE BLDC GLUCOMTR-MCNC: 300 MG/DL — HIGH (ref 70–99)
GLUCOSE SERPL-MCNC: 155 MG/DL — HIGH (ref 70–99)
GLUCOSE UR QL: NEGATIVE MG/DL — SIGNIFICANT CHANGE UP
HCT VFR BLD CALC: 35.3 % — LOW (ref 39–50)
HGB BLD-MCNC: 10.9 G/DL — LOW (ref 13–17)
KETONES UR QL: ABNORMAL MG/DL
LEUKOCYTE ESTERASE UR-ACNC: ABNORMAL
MCHC RBC-ENTMCNC: 26.9 PG — LOW (ref 27–34)
MCHC RBC-ENTMCNC: 30.9 G/DL — LOW (ref 32–36)
MCV RBC AUTO: 87.2 FL — SIGNIFICANT CHANGE UP (ref 80–100)
MRSA PCR RESULT.: SIGNIFICANT CHANGE UP
NITRITE UR-MCNC: NEGATIVE — SIGNIFICANT CHANGE UP
NRBC # BLD AUTO: 0 K/UL — SIGNIFICANT CHANGE UP (ref 0–0)
NRBC # FLD: 0 K/UL — SIGNIFICANT CHANGE UP (ref 0–0)
NRBC BLD AUTO-RTO: 0 /100 WBCS — SIGNIFICANT CHANGE UP (ref 0–0)
PH UR: 8.5 (ref 5–8)
PLATELET # BLD AUTO: 186 K/UL — SIGNIFICANT CHANGE UP (ref 150–400)
PMV BLD: 10.2 FL — SIGNIFICANT CHANGE UP (ref 7–13)
POTASSIUM SERPL-MCNC: 4.1 MMOL/L — SIGNIFICANT CHANGE UP (ref 3.5–5.3)
POTASSIUM SERPL-SCNC: 4.1 MMOL/L — SIGNIFICANT CHANGE UP (ref 3.5–5.3)
PROT UR-MCNC: >=1000 MG/DL
RBC # BLD: 4.05 M/UL — LOW (ref 4.2–5.8)
RBC # FLD: 14.9 % — HIGH (ref 10.3–14.5)
RBC CASTS # UR COMP ASSIST: 239 /HPF — HIGH (ref 0–4)
REVIEW: SIGNIFICANT CHANGE UP
S AUREUS DNA NOSE QL NAA+PROBE: SIGNIFICANT CHANGE UP
SODIUM SERPL-SCNC: 135 MMOL/L — SIGNIFICANT CHANGE UP (ref 135–145)
SP GR SPEC: 1.02 — SIGNIFICANT CHANGE UP (ref 1–1.03)
SQUAMOUS # UR AUTO: 1 /HPF — SIGNIFICANT CHANGE UP (ref 0–5)
TACROLIMUS SERPL-MCNC: 19.5 NG/ML — SIGNIFICANT CHANGE UP
TRI-PHOS CRY UR QL COMP ASSIST: PRESENT
UROBILINOGEN FLD QL: 1 MG/DL — SIGNIFICANT CHANGE UP (ref 0.2–1)
WBC # BLD: 8.98 K/UL — SIGNIFICANT CHANGE UP (ref 3.8–10.5)
WBC # FLD AUTO: 8.98 K/UL — SIGNIFICANT CHANGE UP (ref 3.8–10.5)
WBC UR QL: 378 /HPF — HIGH (ref 0–5)
YEAST-LIKE CELLS: PRESENT

## 2025-07-08 PROCEDURE — 83935 ASSAY OF URINE OSMOLALITY: CPT

## 2025-07-08 PROCEDURE — 86900 BLOOD TYPING SEROLOGIC ABO: CPT

## 2025-07-08 PROCEDURE — 99233 SBSQ HOSP IP/OBS HIGH 50: CPT

## 2025-07-08 PROCEDURE — C1769: CPT

## 2025-07-08 PROCEDURE — 84132 ASSAY OF SERUM POTASSIUM: CPT

## 2025-07-08 PROCEDURE — 80197 ASSAY OF TACROLIMUS: CPT

## 2025-07-08 PROCEDURE — 86901 BLOOD TYPING SEROLOGIC RH(D): CPT

## 2025-07-08 PROCEDURE — 87077 CULTURE AEROBIC IDENTIFY: CPT

## 2025-07-08 PROCEDURE — 82330 ASSAY OF CALCIUM: CPT

## 2025-07-08 PROCEDURE — 85610 PROTHROMBIN TIME: CPT

## 2025-07-08 PROCEDURE — 85027 COMPLETE CBC AUTOMATED: CPT

## 2025-07-08 PROCEDURE — C1894: CPT

## 2025-07-08 PROCEDURE — 87340 HEPATITIS B SURFACE AG IA: CPT

## 2025-07-08 PROCEDURE — 82962 GLUCOSE BLOOD TEST: CPT

## 2025-07-08 PROCEDURE — 82435 ASSAY OF BLOOD CHLORIDE: CPT

## 2025-07-08 PROCEDURE — 85730 THROMBOPLASTIN TIME PARTIAL: CPT

## 2025-07-08 PROCEDURE — 83605 ASSAY OF LACTIC ACID: CPT

## 2025-07-08 PROCEDURE — 99231 SBSQ HOSP IP/OBS SF/LOW 25: CPT

## 2025-07-08 PROCEDURE — 84300 ASSAY OF URINE SODIUM: CPT

## 2025-07-08 PROCEDURE — 83036 HEMOGLOBIN GLYCOSYLATED A1C: CPT

## 2025-07-08 PROCEDURE — C1729: CPT

## 2025-07-08 PROCEDURE — 84100 ASSAY OF PHOSPHORUS: CPT

## 2025-07-08 PROCEDURE — 84156 ASSAY OF PROTEIN URINE: CPT

## 2025-07-08 PROCEDURE — 74176 CT ABD & PELVIS W/O CONTRAST: CPT

## 2025-07-08 PROCEDURE — 85018 HEMOGLOBIN: CPT

## 2025-07-08 PROCEDURE — 83735 ASSAY OF MAGNESIUM: CPT

## 2025-07-08 PROCEDURE — 81001 URINALYSIS AUTO W/SCOPE: CPT

## 2025-07-08 PROCEDURE — 85014 HEMATOCRIT: CPT

## 2025-07-08 PROCEDURE — 82947 ASSAY GLUCOSE BLOOD QUANT: CPT

## 2025-07-08 PROCEDURE — 84295 ASSAY OF SERUM SODIUM: CPT

## 2025-07-08 PROCEDURE — 82570 ASSAY OF URINE CREATININE: CPT

## 2025-07-08 PROCEDURE — 86850 RBC ANTIBODY SCREEN: CPT

## 2025-07-08 PROCEDURE — 93990 DOPPLER FLOW TESTING: CPT

## 2025-07-08 PROCEDURE — 84540 ASSAY OF URINE/UREA-N: CPT

## 2025-07-08 PROCEDURE — 84133 ASSAY OF URINE POTASSIUM: CPT

## 2025-07-08 PROCEDURE — 80053 COMPREHEN METABOLIC PANEL: CPT

## 2025-07-08 PROCEDURE — 85025 COMPLETE CBC W/AUTO DIFF WBC: CPT

## 2025-07-08 PROCEDURE — 87086 URINE CULTURE/COLONY COUNT: CPT

## 2025-07-08 PROCEDURE — 80048 BASIC METABOLIC PNL TOTAL CA: CPT

## 2025-07-08 PROCEDURE — 93005 ELECTROCARDIOGRAM TRACING: CPT

## 2025-07-08 PROCEDURE — 87641 MR-STAPH DNA AMP PROBE: CPT

## 2025-07-08 PROCEDURE — 82803 BLOOD GASES ANY COMBINATION: CPT

## 2025-07-08 PROCEDURE — 94640 AIRWAY INHALATION TREATMENT: CPT

## 2025-07-08 PROCEDURE — 76776 US EXAM K TRANSPL W/DOPPLER: CPT

## 2025-07-08 PROCEDURE — 87640 STAPH A DNA AMP PROBE: CPT

## 2025-07-08 RX ORDER — ACETAMINOPHEN 500 MG/5ML
975 LIQUID (ML) ORAL ONCE
Refills: 0 | Status: COMPLETED | OUTPATIENT
Start: 2025-07-08 | End: 2025-07-08

## 2025-07-08 RX ORDER — CEFTRIAXONE 500 MG/1
1000 INJECTION, POWDER, FOR SOLUTION INTRAMUSCULAR; INTRAVENOUS EVERY 24 HOURS
Refills: 0 | Status: COMPLETED | OUTPATIENT
Start: 2025-07-08 | End: 2025-07-14

## 2025-07-08 RX ORDER — ASPIRIN 325 MG
81 TABLET ORAL DAILY
Refills: 0 | Status: DISCONTINUED | OUTPATIENT
Start: 2025-07-08 | End: 2025-07-10

## 2025-07-08 RX ADMIN — Medication 2 TABLET(S): at 21:57

## 2025-07-08 RX ADMIN — METOPROLOL SUCCINATE 50 MILLIGRAM(S): 50 TABLET, EXTENDED RELEASE ORAL at 05:33

## 2025-07-08 RX ADMIN — ATOVAQUONE 1500 MILLIGRAM(S): 750 SUSPENSION ORAL at 21:57

## 2025-07-08 RX ADMIN — INSULIN LISPRO 2: 100 INJECTION, SOLUTION INTRAVENOUS; SUBCUTANEOUS at 17:48

## 2025-07-08 RX ADMIN — MYCOPHENOLATE MOFETIL 500 MILLIGRAM(S): 500 TABLET, FILM COATED ORAL at 05:32

## 2025-07-08 RX ADMIN — Medication 975 MILLIGRAM(S): at 05:44

## 2025-07-08 RX ADMIN — Medication 975 MILLIGRAM(S): at 06:30

## 2025-07-08 RX ADMIN — INSULIN LISPRO 3: 100 INJECTION, SOLUTION INTRAVENOUS; SUBCUTANEOUS at 12:48

## 2025-07-08 RX ADMIN — PREDNISONE 5 MILLIGRAM(S): 20 TABLET ORAL at 05:31

## 2025-07-08 RX ADMIN — POLYETHYLENE GLYCOL 3350 17 GRAM(S): 17 POWDER, FOR SOLUTION ORAL at 12:48

## 2025-07-08 RX ADMIN — Medication 650 MILLIGRAM(S): at 15:32

## 2025-07-08 RX ADMIN — Medication 81 MILLIGRAM(S): at 17:14

## 2025-07-08 RX ADMIN — TAMSULOSIN HYDROCHLORIDE 0.4 MILLIGRAM(S): 0.4 CAPSULE ORAL at 21:57

## 2025-07-08 RX ADMIN — CEFTRIAXONE 100 MILLIGRAM(S): 500 INJECTION, POWDER, FOR SOLUTION INTRAMUSCULAR; INTRAVENOUS at 20:16

## 2025-07-08 RX ADMIN — Medication 650 MILLIGRAM(S): at 21:58

## 2025-07-08 RX ADMIN — Medication 60 MILLIGRAM(S): at 05:33

## 2025-07-08 RX ADMIN — Medication 1 APPLICATION(S): at 05:49

## 2025-07-08 RX ADMIN — MYCOPHENOLATE MOFETIL 500 MILLIGRAM(S): 500 TABLET, FILM COATED ORAL at 17:14

## 2025-07-08 RX ADMIN — ATORVASTATIN CALCIUM 10 MILLIGRAM(S): 80 TABLET, FILM COATED ORAL at 21:57

## 2025-07-08 RX ADMIN — Medication 650 MILLIGRAM(S): at 05:32

## 2025-07-08 NOTE — PROGRESS NOTE ADULT - ASSESSMENT
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

## 2025-07-08 NOTE — DISCHARGE NOTE PROVIDER - CARE PROVIDER_API CALL
[facility]  Radiology Vascular & Interventional Radiology  [address] [place] [id number]  Phone: [phone]  Fax: [phone]  Scheduled Appointment: 07/21/2025 10:00 AM   [facility]  Radiology Vascular & Interventional Radiology  [address] [place] [address]  Phone: [phone]  Fax: [phone]  Scheduled Appointment: 08/15/2025 10:00 AM   [facility]  Radiology Vascular & Interventional Radiology  [address] [place] [address]  Phone: [phone]  Fax: [phone]  Follow Up Time:

## 2025-07-08 NOTE — DISCHARGE NOTE PROVIDER - HOSPITAL COURSE
HPI:  74 year old Male with PMHx of  ESRD on HD via L A/V fistula (~[id number]) s/p Kidney Transplant Recipient (2018) c/b hydronephrosis in transplant kidney managed with ureteral stent exchanges (last exchange per chart review 02/2025 with Dr. 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 Pt currently without any complaints or symptoms. Denies chest pain, dyspnea, abdominal pain, dysuria, He has been taking his medications as prescribed without issue. In the ED, labs notable for elevated SCr to 5.57. US kidney without any significant KEREN, mild hydronephrosis noted. Pt seen by nephrology and urology. Pt admitted for further management.  (02 Jul 2025 02:55)    Hospital Course:  7/17 Snare utilized to retrieve stent.   10Fr nephrostomy tube replaced.    Important Medication Changes and Reason:  see MAR     Active or Pending Issues Requiring Follow-up:  Urology   PCP     Advanced Directives:   [ X] Full code  [ ] DNR  [ ] Hospice    Discharge Diagnoses:   CHELA  Hyperkalemia             HPI:  74 year old Male with PMHx of  ESRD on HD via L A/V fistula (~[id number]) s/p Kidney Transplant Recipient (2018) c/b hydronephrosis in transplant kidney managed with ureteral stent exchanges (last exchange per chart review 02/2025 with Dr. 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 Pt currently without any complaints or symptoms. Denies chest pain, dyspnea, abdominal pain, dysuria, He has been taking his medications as prescribed without issue. In the ED, labs notable for elevated SCr to 5.57. US kidney without any significant KEREN, mild hydronephrosis noted. Pt seen by nephrology and urology. Pt admitted for further management.  (02 Jul 2025 02:55)    Hospital Course:  7/17 Snare utilized to retrieve stent.   10Fr nephrostomy tube replaced.    Important Medication Changes and Reason:  see MAR     Active or Pending Issues Requiring Follow-up:  Renal transplant team   PCP     Advanced Directives:   [ X] Full code  [ ] DNR  [ ] Hospice    Discharge Diagnoses:   CHELA  Hyperkalemia             HPI:  74 year old Male with PMHx of  ESRD on HD via L A/V fistula (~[id number]) s/p Kidney Transplant Recipient (2018) c/b hydronephrosis in transplant kidney managed with ureteral stent exchanges (last exchange per chart review 02/2025 with Dr. 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 Pt currently without any complaints or symptoms. Denies chest pain, dyspnea, abdominal pain, dysuria, He has been taking his medications as prescribed without issue. In the ED, labs notable for elevated SCr to 5.57. US kidney without any significant KEREN, mild hydronephrosis noted. Pt seen by nephrology and urology. Pt admitted for further management.  (02 Jul 2025 02:55)    Hospital Course:  7/17 Snare utilized to retrieve stent.   10Fr nephrostomy tube replaced.    Important Medication Changes and Reason:  see MAR     Active or Pending Issues Requiring Follow-up:  Renal transplant team   PCP     Advanced Directives:   [ X] Full code  [ ] DNR  [ ] Hospice    Discharge Diagnoses:   CHELA on CKD4  Hyperkalemia  Pyelonephritis  Renal Transplant recipient

## 2025-07-08 NOTE — DISCHARGE NOTE PROVIDER - NSDCCPTREATMENT_GEN_ALL_CORE_FT
PRINCIPAL PROCEDURE  Procedure: Nephrostomy  Findings and Treatment: You had a transplant nephrostomy tube placed by Dr. Kim on 7/7/25 in Intervetnional Radiology (IR).   Monitor site for any sign or symptoms of infection (painful red skin, green/ yellow foul smelling discharge from the insertion site, fever, chills, leakage around drain site).   Flush drain with 5mL NS daily. If you meet resistance upon flushing, STOP and contact IR.   Empty drainage bag or bulb daily and record output. If there is a sudden decrease in output please contact IR to schedule  an appointment.  Drain care:  -Disconnect tubing (tube attached to bag/ bulb)  from the catheter (catheter going into skin)  -Clean catheter with alcohol swab  -Twist on the flush syringe to the catheter (be sure to push the air out of syringe)  -Flush catheter with 5mL NS (DO NOT pull back on syringe). If you meet resistance upon flushing, STOP and contact IR.   -Disconnect syringe from catheter and reconnect drainage bag or bulb.  Dressing care:  -Wash hands thoroughly with water and soap for at least 20 seconds.   -take off old dressing and clean around drain site with gauze soaked with warm water  -After cleaning the site, dry and replace dressing with a new gauze and tegaderm.   -Place one piece of gauze underneath the drain and another piece of gauze on top of drain.   -Apply tegaderm (clear dressing) on top.  -Change dressing every 3 days or when soiled

## 2025-07-08 NOTE — PROGRESS NOTE ADULT - SUBJECTIVE AND OBJECTIVE BOX
[facility] Division of Hospital Medicine  Lauren Sexton DO  [employer] Teams    Patient is a 74y old  Male who presents with a chief complaint of martita (2025 13:40)        SUBJECTIVE / OVERNIGHT EVENTS: c/o pain near nephrostomy site      MEDICATIONS  (STANDING):  aspirin  chewable 81 milliGRAM(s) Oral daily  atorvastatin 10 milliGRAM(s) Oral at bedtime  atovaquone  Suspension 1500 milliGRAM(s) Oral daily  cefTRIAXone   IVPB 1000 milliGRAM(s) IV Intermittent every 24 hours  chlorhexidine 2% Cloths 1 Application(s) Topical daily  dextrose 5%. 1000 milliLiter(s) (100 mL/Hr) IV Continuous <Continuous>  dextrose 50% Injectable 25 Gram(s) IV Push once  dextrose 50% Injectable 12.5 Gram(s) IV Push once  dextrose 50% Injectable 25 Gram(s) IV Push once  glucagon  Injectable 1 milliGRAM(s) IntraMuscular once  insulin lispro (ADMELOG) corrective regimen sliding scale   SubCutaneous three times a day before meals  insulin lispro (ADMELOG) corrective regimen sliding scale   SubCutaneous at bedtime  metoprolol succinate ER 50 milliGRAM(s) Oral daily  mycophenolate mofetil 500 milliGRAM(s) Oral two times a day  NIFEdipine XL 60 milliGRAM(s) Oral daily  polyethylene glycol 3350 17 Gram(s) Oral daily  predniSONE   Tablet 5 milliGRAM(s) Oral daily  senna 2 Tablet(s) Oral at bedtime  sodium bicarbonate 650 milliGRAM(s) Oral every 8 hours  tamsulosin 0.4 milliGRAM(s) Oral at bedtime    MEDICATIONS  (PRN):  dextrose Oral Gel 15 Gram(s) Oral once PRN Blood Glucose LESS THAN 70 milliGRAM(s)/deciliter  ondansetron Injectable 4 milliGRAM(s) IV Push once PRN Nausea and/or Vomiting  sodium chloride 0.9% Bolus. 100 milliLiter(s) IV Bolus every 5 minutes PRN SBP LESS THAN or EQUAL to 90 mmHg      Vital Signs Last 24 Hrs  T(C): 37.1 (2025 11:06), Max: 37.1 (2025 11:06)  T(F): 98.7 (2025 11:06), Max: 98.7 (2025 11:06)  HR: 79 (2025 11:06) (79 - 82)  BP: 125/56 (2025 11:06) (114/62 - 125/56)  BP(mean): 79 (2025 11:06) (79 - 79)  RR: 18 (2025 11:06) (18 - 18)  SpO2: 98% (2025 11:06) (95% - 98%)    Parameters below as of 2025 11:06  Patient On (Oxygen Delivery Method): room air      CAPILLARY BLOOD GLUCOSE      POCT Blood Glucose.: 220 mg/dL (2025 17:31)  POCT Blood Glucose.: 300 mg/dL (2025 12:28)  POCT Blood Glucose.: 140 mg/dL (2025 08:22)  POCT Blood Glucose.: 124 mg/dL (2025 21:08)    I&O's Summary    2025 07:01  -  2025 07:00  --------------------------------------------------------  IN: 200 mL / OUT: 1450 mL / NET: -1250 mL    2025 07:01  -  2025 20:05  --------------------------------------------------------  IN: 0 mL / OUT: 500 mL / NET: -500 mL        PHYSICAL EXAM:  GENERAL: NAD, breathing not labored  NECK: supple, No JVD  CHEST/LUNG: CTA b/l  HEART: S1 S2 RRR  ABDOMEN: +BS Soft, NT/ND  EXTREMITIES:  2+ DP Pulses, No c/c. no LE edema  NEUROLOGY: AAOx3, no facial droop, moving all extremities   SKIN: anterior NT - site without erythema, draining tan urine    LABS:                        10.9   8.98  )-----------( 186      ( 2025 05:43 )             35.3     07-08    135  |  95[L]  |  26[H]  ----------------------------<  155[H]  4.1   |  26  |  5.94[H]    Ca    9.2      2025 05:43  Phos  5.4     07-  Mg     2.0     -            Urinalysis Basic - ( 2025 18:06 )    Color: Orange / Appearance: Turbid / S.022 / pH: x  Gluc: x / Ketone: x  / Bili: Negative / Urobili: 1.0 mg/dL   Blood: x / Protein: >=1000 mg/dL / Nitrite: Negative   Leuk Esterase: Large / RBC: 239 /HPF /  /HPF   Sq Epi: x / Non Sq Epi: 1 /HPF / Bacteria: Too Numerous to count /HPF        RADIOLOGY & ADDITIONAL TESTS:    Imaging Personally Reviewed:  Consultant(s) Notes Reviewed:    Care Discussed with Consultants/Other Providers:

## 2025-07-08 NOTE — PROGRESS NOTE ADULT - SUBJECTIVE AND OBJECTIVE BOX
Interventional Radiology Follow-Up Note    This is a 74y Male s/p transplant kidney nephrostomy tube placement on  in Interventional Radiology with Dr. Kim.     S: Patient seen and examined @ bedside. Reports mild pain over RLQ.     Medication:     atovaquone  Suspension: ()  metoprolol succinate ER: ()  NIFEdipine XL: ()    Vitals:  T(F): 98.7, Max: 98.7 (11:06)  HR: 79  BP: 125/56  RR: 18  SpO2: 98%    Physical Exam:  General: Nontoxic, in NAD, A&O x3.  Abdomen: soft, NTND, no peritoneal signs.  Drain Device: Drain intact attached to bag with slightly cloudy brown urine. Dressing clean, dry, intact. Drain noted to be kinked and was readjusted. Drain flushed with 5cc NS w/o issues, no pericatheter leakage, +fluid return noted.     LABS:  WBC 8.98 / Hgb 10.9 / Hct 35.3 / Plt 186  Na 135 / K 4.1 / CO2 26 / Cl 95 / BUN 26 / Cr 5.94 / Glucose 155  ALT -- / AST -- / Alk Phos -- / Tbili --  Ptt -- / Pt -- / INR --      24hr Drain output: 150cc    Assessment/Plan:  75 yo M PMH ESRD s/p Kidney Transplant Recipient () c/b hydronephrosis in transplant kidney managed with ureteral stent exchanges (last exchange per chart review 2025 with Dr. 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 Patient is currently s/p transplant kidney nephrostomy tube placement on  in Interventional Radiology with Dr. Kim.     -h/h stable  -Cr 6.76-->5.94 today  -WBC wnl   -trend vs/labs  -flush drain with 5cc NS daily forward only; DO NOT aspirate  -change dressing q3 days or when dressing is saturated  -regarding outpatient follow up with IR, Patient scheduled for stent retrieval with Dr. Kim on 8/15 at 10am.   -Hold asa x 5 days prior to procedure if able.   -NPO after midnight the night before procedure. ok to take morning meds with sip of water.   -Will need labs drawn prior to procedure.   -they will benefit from VNS service to help with drainage catheter care; they should continue same drainage catheter care as an outpatient.  - Greater than 50% of the encounter was spent counseling and/or coordination of care on drain management and follow up.   -continue global management per primary team       Any questions or concerns regarding above please reach out to IR:   -Available on [employer] teams  -During working hours (7a-5p): call [phone]  -Emergent issues after 5pm: page: [phone]  -Non-emergent consults: Please place a [place] order "IR Consult" with an appropriate callback number  -Scheduling questions: [phone]  -Clinic/Outpatient [phone]      Savanah Lo PA-C  Available on teams

## 2025-07-08 NOTE — PROGRESS NOTE ADULT - PROBLEM SELECTOR PLAN 1
CHELA on CKD 2/2 malpositioned ureteral stent  - S/p RNT placement 7/7 - tan urine - IR evaluation   UA+, UCx pending - previous klebsiella sensitive to ceftriaxone  started ceftriaxone    - Nephrology f/u  - bactrim changed to mepron for ppx  - sodium bicarb TID  - Monitor urine output, I/Os, Cr  - serial bladder scans for PVR  - Has AVF with chronic left arm swelling- vasc following

## 2025-07-08 NOTE — DISCHARGE NOTE PROVIDER - NSDCFUADDAPPT_GEN_ALL_CORE_FT
You have an appointment with IR on 7/21/25 at 10am for stent retrieval with Dr. Kim. Should you need to reschedule you may call the IR booking office @ [phone]. Please feel free to contact us at [phone] if any problems arise. After 6PM, Monday through Friday, on weekends and on holidays, please call [phone] and ask for the radiology resident on call to be paged.  You have an appointment with IR on 8/15/25 at 10am for stent retrieval with Dr. Kim. Should you need to reschedule you may call the IR booking office @ [phone]. Please feel free to contact us at [phone] if any problems arise. After 6PM, Monday through Friday, on weekends and on holidays, please call [phone] and ask for the radiology resident on call to be paged.  You may call the IR booking office @ [phone] to schedule your nephrostomy tube evaluation and exchange in 3 months. Please feel free to contact us at [phone] if any problems arise. After 6PM, Monday through Friday, on weekends and on holidays, please call [phone] and ask for the radiology resident on call to be paged.  You may call the IR booking office @ [phone] to schedule your nephrostomy tube evaluation and exchange in 3 months. Please feel free to contact us at [phone] if any problems arise. After 6PM, Monday through Friday, on weekends and on holidays, please call [phone] and ask for the radiology resident on call to be paged.     APPTS ARE READY TO BE MADE: [X ] YES    Best Family or Patient Contact (if needed):  Additional Information about above appointments (if needed):    1: Transplant nephro- Dr Bhakta 1-2 weeks   2: IR   3: PCP     Other comments or requests:    You may call the IR booking office @ [phone] to schedule your nephrostomy tube evaluation and exchange in 3 months. Please feel free to contact us at [phone] if any problems arise. After 6PM, Monday through Friday, on weekends and on holidays, please call [phone] and ask for the radiology resident on call to be paged.     APPTS ARE READY TO BE MADE: [X ] YES    Best Family or Patient Contact (if needed):  Additional Information about above appointments (if needed):    1: Transplant nephro- Dr Bhakta 1-2 weeks   2: IR   3: PCP     Other comments or requests:   Prior to outreaching the patient, it was visible that the patient has secured a follow up appointment which was not scheduled by our team. Patient is scheduled to see Dr. Meyers on  You may call the IR booking office @ [phone] to schedule your nephrostomy tube evaluation and exchange in 3 months. Please feel free to contact us at [phone] if any problems arise. After 6PM, Monday through Friday, on weekends and on holidays, please call [phone] and ask for the radiology resident on call to be paged.     APPTS ARE READY TO BE MADE: [X ] YES    Best Family or Patient Contact (if needed):  Additional Information about above appointments (if needed):    1: Transplant nephro- Dr Bhakta 1-2 weeks   2: IR   3: PCP     Other comments or requests:   Prior to outreaching the patient, it was visible that the patient has secured a follow up appointment which was not scheduled by our team. Patient is scheduled to see Dr. Serrano on 7/31 at [address] [place] [address]    Prior to outreaching the patient, it was visible that the patient has secured a follow up appointment which was not scheduled by our team. Patient is scheduled to see Dr. Bhakta on 9/30 at [address] [place] [address]  Prior to outreaching the patient, it was visible that the patient has secured a follow up appointment which was not scheduled by our team. Patient is scheduled to see Dr. Castro on 2/25 at [address] [place] [address]

## 2025-07-08 NOTE — DISCHARGE NOTE PROVIDER - NSDCFUSCHEDAPPT_GEN_ALL_CORE_FT
[facility]  VASCULAR [address]  Scheduled Appointment: 07/10/2025    Bannazadeh, Mohsen  [facility]  VASCULAR [address]  Scheduled Appointment: 07/10/2025    Arian Bhakta  [facility]  NEPHRO [address] [place] [address]  Scheduled Appointment: 09/30/2025     Arian Bhakta  [facility]  NEPHRO [address] [place] [address]  Scheduled Appointment: 09/30/2025     [facility]  VASCULAR [address]  Scheduled Appointment: 07/31/2025    Bannazadeh, Mohsen  [facility]  VASCULAR [address]  Scheduled Appointment: 07/31/2025    Arian Bhakta  [facility]  NEPHRO [address] [place] [address]  Scheduled Appointment: 09/30/2025

## 2025-07-08 NOTE — DISCHARGE NOTE PROVIDER - NSDCMRMEDTOKEN_GEN_ALL_CORE_FT
aspirin 81 mg oral tablet: 1 tab(s) orally once a day  atorvastatin 10 mg oral tablet: 1 tab(s) orally once a day  Envarsus XR 1 mg oral tablet, extended release: 2 tab(s) orally once a day Take with 4mg tablet for total of 6mg daily  Envarsus XR 4 mg oral tablet, extended release: 1 tab(s) orally once a day Take with two 1mg tablets for total of 6mg daily  glimepiride 2 mg oral tablet: 1 tab(s) orally 2 times a day  Metoprolol Succinate ER 50 mg oral tablet, extended release: 1 tab(s) orally once a day  mycophenolate mofetil 500 mg oral tablet: 1 tab(s) orally 2 times a day  NIFEdipine 60 mg oral tablet, extended release: 1 tab(s) orally once a day  predniSONE 5 mg oral tablet: 1 tab(s) orally once a day  Rybelsus 14 mg oral tablet: 1 tab(s) orally once a day  sulfamethoxazole-trimethoprim 400 mg-80 mg oral tablet: 1 tab(s) orally once a day  tamsulosin 0.4 mg oral capsule: 1 cap(s) orally once a day   aspirin 81 mg oral tablet: 1 tab(s) orally once a day  atorvastatin 10 mg oral tablet: 1 tab(s) orally once a day  Envarsus XR 4 mg oral tablet, extended release: 1 tab(s) orally once a day  glimepiride 2 mg oral tablet: 1 tab(s) orally 2 times a day  Metoprolol Succinate ER 50 mg oral tablet, extended release: 1 tab(s) orally once a day  NIFEdipine 60 mg oral tablet, extended release: 1 tab(s) orally once a day  predniSONE 5 mg oral tablet: 1 tab(s) orally once a day  Rybelsus 14 mg oral tablet: 1 tab(s) orally once a day  sodium bicarbonate 650 mg oral tablet: 1 tab(s) orally every 8 hours  tamsulosin 0.4 mg oral capsule: 1 cap(s) orally once a day

## 2025-07-08 NOTE — DISCHARGE NOTE PROVIDER - PROVIDER TOKENS
PROVIDER:[TOKEN:[[id number]],SCHEDULEDAPPT:[07/21/2025],SCHEDULEDAPPTTIME:[10:00 AM]] PROVIDER:[TOKEN:[[id number]],SCHEDULEDAPPT:[08/15/2025],SCHEDULEDAPPTTIME:[10:00 AM]] PROVIDER:[TOKEN:[[id number]]]

## 2025-07-08 NOTE — DISCHARGE NOTE PROVIDER - NSDCCPCAREPLAN_GEN_ALL_CORE_FT
PRINCIPAL DISCHARGE DIAGNOSIS  Diagnosis: Acute kidney injury (nontraumatic)  Assessment and Plan of Treatment: Avoid taking (NSAIDs) - (ex: Ibuprofen, Advil, Celebrex, Naprosyn)  Avoid taking any nephrotoxic agents (can harm kidneys) - Intravenous contrast for diagnostic testing, combination cold medications.  Have all medications adjusted for your renal function by your Health Care Provider.  Blood pressure control is important.  Take all medication as prescribed.        SECONDARY DISCHARGE DIAGNOSES  Diagnosis: HTN (hypertension)  Assessment and Plan of Treatment: Low salt diet  Activity as tolerated.  Take all medication as prescribed.  Follow up with your medical doctor for routine blood pressure monitoring at your next visit.  Notify your doctor if you have any of the following symptoms:   Dizziness, Lightheadedness, Blurry vision, Headache, Chest pain, Shortness of breath      Diagnosis: T2DM (type 2 diabetes mellitus)  Assessment and Plan of Treatment: HgA1C this admission.  Make sure you get your HgA1c checked every three months.  If you take oral diabetes medications, check your blood glucose two times a day.  If you take insulin, check your blood glucose before meals and at bedtime.  It's important not to skip any meals.  Keep a log of your blood glucose results and always take it with you to your doctor appointments.  Keep a list of your current medications including injectables and over the counter medications and bring this medication list with you to all your doctor appointments.  If you have not seen your ophthalmologist this year call for appointment.  Check your feet daily for redness, sores, or openings. Do not self treat. If no improvement in two days call your primary care physician for an appointment.  Low blood sugar (hypoglycemia) is a blood sugar below 70mg/dl. Check your blood sugar if you feel signs/symptoms of hypoglycemia. If your blood sugar is below 70 take 15 grams of carbohydrates (ex 4 oz of apple juice, 3-4 glucose tablets, or 4-6 oz of regular soda) wait 15 minutes and repeat blood sugar to make sure it comes up above 70.  If your blood sugar is above 70 and you are due for a meal, have a meal.  If you are not due for a meal have a snack.  This snack helps keeps your blood sugar at a safe range.

## 2025-07-08 NOTE — DISCHARGE NOTE PROVIDER - NSDCHHNEEDSERVICE_GEN_ALL_CORE
Medication teaching and assessment/Observation and assessment/Teaching and training Medication teaching and assessment/Observation and assessment/Teaching and training/Other, specify...

## 2025-07-09 LAB
-  AMOXICILLIN/CLAVULANIC ACID: SIGNIFICANT CHANGE UP
-  AMPICILLIN/SULBACTAM: SIGNIFICANT CHANGE UP
-  AMPICILLIN: SIGNIFICANT CHANGE UP
-  AZTREONAM: SIGNIFICANT CHANGE UP
-  CEFAZOLIN: SIGNIFICANT CHANGE UP
-  CEFEPIME: SIGNIFICANT CHANGE UP
-  CEFOXITIN: SIGNIFICANT CHANGE UP
-  CEFTRIAXONE: SIGNIFICANT CHANGE UP
-  CEFUROXIME: SIGNIFICANT CHANGE UP
-  CIPROFLOXACIN: SIGNIFICANT CHANGE UP
-  ERTAPENEM: SIGNIFICANT CHANGE UP
-  GENTAMICIN: SIGNIFICANT CHANGE UP
-  LEVOFLOXACIN: SIGNIFICANT CHANGE UP
-  MEROPENEM: SIGNIFICANT CHANGE UP
-  PIPERACILLIN/TAZOBACTAM: SIGNIFICANT CHANGE UP
-  TOBRAMYCIN: SIGNIFICANT CHANGE UP
-  TRIMETHOPRIM/SULFAMETHOXAZOLE: SIGNIFICANT CHANGE UP
ANION GAP SERPL CALC-SCNC: 18 MMOL/L — HIGH (ref 5–17)
BLD GP AB SCN SERPL QL: NEGATIVE — SIGNIFICANT CHANGE UP
BUN SERPL-MCNC: 43 MG/DL — HIGH (ref 7–23)
CALCIUM SERPL-MCNC: 9.1 MG/DL — SIGNIFICANT CHANGE UP (ref 8.4–10.5)
CHLORIDE SERPL-SCNC: 95 MMOL/L — LOW (ref 96–108)
CO2 SERPL-SCNC: 22 MMOL/L — SIGNIFICANT CHANGE UP (ref 22–31)
CREAT SERPL-MCNC: 6.44 MG/DL — HIGH (ref 0.5–1.3)
CULTURE RESULTS: ABNORMAL
EGFR: 8 ML/MIN/1.73M2 — LOW
EGFR: 8 ML/MIN/1.73M2 — LOW
GLUCOSE BLDC GLUCOMTR-MCNC: 181 MG/DL — HIGH (ref 70–99)
GLUCOSE BLDC GLUCOMTR-MCNC: 215 MG/DL — HIGH (ref 70–99)
GLUCOSE BLDC GLUCOMTR-MCNC: 221 MG/DL — HIGH (ref 70–99)
GLUCOSE BLDC GLUCOMTR-MCNC: 274 MG/DL — HIGH (ref 70–99)
GLUCOSE SERPL-MCNC: 140 MG/DL — HIGH (ref 70–99)
HCT VFR BLD CALC: 33.2 % — LOW (ref 39–50)
HCT VFR BLD CALC: 33.5 % — LOW (ref 39–50)
HGB BLD-MCNC: 10.4 G/DL — LOW (ref 13–17)
HGB BLD-MCNC: 10.4 G/DL — LOW (ref 13–17)
MCHC RBC-ENTMCNC: 27.1 PG — SIGNIFICANT CHANGE UP (ref 27–34)
MCHC RBC-ENTMCNC: 27.2 PG — SIGNIFICANT CHANGE UP (ref 27–34)
MCHC RBC-ENTMCNC: 31 G/DL — LOW (ref 32–36)
MCHC RBC-ENTMCNC: 31.3 G/DL — LOW (ref 32–36)
MCV RBC AUTO: 86.5 FL — SIGNIFICANT CHANGE UP (ref 80–100)
MCV RBC AUTO: 87.5 FL — SIGNIFICANT CHANGE UP (ref 80–100)
METHOD TYPE: SIGNIFICANT CHANGE UP
NRBC # BLD AUTO: 0 K/UL — SIGNIFICANT CHANGE UP (ref 0–0)
NRBC # BLD AUTO: 0 K/UL — SIGNIFICANT CHANGE UP (ref 0–0)
NRBC # FLD: 0 K/UL — SIGNIFICANT CHANGE UP (ref 0–0)
NRBC # FLD: 0 K/UL — SIGNIFICANT CHANGE UP (ref 0–0)
NRBC BLD AUTO-RTO: 0 /100 WBCS — SIGNIFICANT CHANGE UP (ref 0–0)
NRBC BLD AUTO-RTO: 0 /100 WBCS — SIGNIFICANT CHANGE UP (ref 0–0)
ORGANISM # SPEC MICROSCOPIC CNT: ABNORMAL
ORGANISM # SPEC MICROSCOPIC CNT: ABNORMAL
PLATELET # BLD AUTO: 168 K/UL — SIGNIFICANT CHANGE UP (ref 150–400)
PLATELET # BLD AUTO: 168 K/UL — SIGNIFICANT CHANGE UP (ref 150–400)
PMV BLD: 10.1 FL — SIGNIFICANT CHANGE UP (ref 7–13)
PMV BLD: 10.2 FL — SIGNIFICANT CHANGE UP (ref 7–13)
POTASSIUM SERPL-MCNC: 3.9 MMOL/L — SIGNIFICANT CHANGE UP (ref 3.5–5.3)
POTASSIUM SERPL-SCNC: 3.9 MMOL/L — SIGNIFICANT CHANGE UP (ref 3.5–5.3)
RBC # BLD: 3.83 M/UL — LOW (ref 4.2–5.8)
RBC # BLD: 3.84 M/UL — LOW (ref 4.2–5.8)
RBC # FLD: 14.5 % — SIGNIFICANT CHANGE UP (ref 10.3–14.5)
RBC # FLD: 14.7 % — HIGH (ref 10.3–14.5)
RH IG SCN BLD-IMP: POSITIVE — SIGNIFICANT CHANGE UP
SODIUM SERPL-SCNC: 135 MMOL/L — SIGNIFICANT CHANGE UP (ref 135–145)
SPECIMEN SOURCE: SIGNIFICANT CHANGE UP
TACROLIMUS SERPL-MCNC: 9.2 NG/ML — SIGNIFICANT CHANGE UP
WBC # BLD: 5.24 K/UL — SIGNIFICANT CHANGE UP (ref 3.8–10.5)
WBC # BLD: 5.55 K/UL — SIGNIFICANT CHANGE UP (ref 3.8–10.5)
WBC # FLD AUTO: 5.24 K/UL — SIGNIFICANT CHANGE UP (ref 3.8–10.5)
WBC # FLD AUTO: 5.55 K/UL — SIGNIFICANT CHANGE UP (ref 3.8–10.5)

## 2025-07-09 PROCEDURE — 84540 ASSAY OF URINE/UREA-N: CPT

## 2025-07-09 PROCEDURE — 82803 BLOOD GASES ANY COMBINATION: CPT

## 2025-07-09 PROCEDURE — 80048 BASIC METABOLIC PNL TOTAL CA: CPT

## 2025-07-09 PROCEDURE — 87640 STAPH A DNA AMP PROBE: CPT

## 2025-07-09 PROCEDURE — 84295 ASSAY OF SERUM SODIUM: CPT

## 2025-07-09 PROCEDURE — 80053 COMPREHEN METABOLIC PANEL: CPT

## 2025-07-09 PROCEDURE — 85027 COMPLETE CBC AUTOMATED: CPT

## 2025-07-09 PROCEDURE — 74176 CT ABD & PELVIS W/O CONTRAST: CPT

## 2025-07-09 PROCEDURE — 84133 ASSAY OF URINE POTASSIUM: CPT

## 2025-07-09 PROCEDURE — 85025 COMPLETE CBC W/AUTO DIFF WBC: CPT

## 2025-07-09 PROCEDURE — 76776 US EXAM K TRANSPL W/DOPPLER: CPT

## 2025-07-09 PROCEDURE — 85014 HEMATOCRIT: CPT

## 2025-07-09 PROCEDURE — 82570 ASSAY OF URINE CREATININE: CPT

## 2025-07-09 PROCEDURE — 82947 ASSAY GLUCOSE BLOOD QUANT: CPT

## 2025-07-09 PROCEDURE — 85610 PROTHROMBIN TIME: CPT

## 2025-07-09 PROCEDURE — 83735 ASSAY OF MAGNESIUM: CPT

## 2025-07-09 PROCEDURE — 94640 AIRWAY INHALATION TREATMENT: CPT

## 2025-07-09 PROCEDURE — 84132 ASSAY OF SERUM POTASSIUM: CPT

## 2025-07-09 PROCEDURE — 80197 ASSAY OF TACROLIMUS: CPT

## 2025-07-09 PROCEDURE — 93990 DOPPLER FLOW TESTING: CPT

## 2025-07-09 PROCEDURE — 84156 ASSAY OF PROTEIN URINE: CPT

## 2025-07-09 PROCEDURE — 76775 US EXAM ABDO BACK WALL LIM: CPT | Mod: 26

## 2025-07-09 PROCEDURE — 87077 CULTURE AEROBIC IDENTIFY: CPT

## 2025-07-09 PROCEDURE — 83935 ASSAY OF URINE OSMOLALITY: CPT

## 2025-07-09 PROCEDURE — C1894: CPT

## 2025-07-09 PROCEDURE — 99223 1ST HOSP IP/OBS HIGH 75: CPT | Mod: 25

## 2025-07-09 PROCEDURE — 82962 GLUCOSE BLOOD TEST: CPT

## 2025-07-09 PROCEDURE — 81001 URINALYSIS AUTO W/SCOPE: CPT

## 2025-07-09 PROCEDURE — 83605 ASSAY OF LACTIC ACID: CPT

## 2025-07-09 PROCEDURE — 99233 SBSQ HOSP IP/OBS HIGH 50: CPT

## 2025-07-09 PROCEDURE — C1769: CPT

## 2025-07-09 PROCEDURE — G0545: CPT

## 2025-07-09 PROCEDURE — 87186 SC STD MICRODIL/AGAR DIL: CPT

## 2025-07-09 PROCEDURE — 83036 HEMOGLOBIN GLYCOSYLATED A1C: CPT

## 2025-07-09 PROCEDURE — 87340 HEPATITIS B SURFACE AG IA: CPT

## 2025-07-09 PROCEDURE — 86901 BLOOD TYPING SEROLOGIC RH(D): CPT

## 2025-07-09 PROCEDURE — 82435 ASSAY OF BLOOD CHLORIDE: CPT

## 2025-07-09 PROCEDURE — 87641 MR-STAPH DNA AMP PROBE: CPT

## 2025-07-09 PROCEDURE — 86900 BLOOD TYPING SEROLOGIC ABO: CPT

## 2025-07-09 PROCEDURE — 82330 ASSAY OF CALCIUM: CPT

## 2025-07-09 PROCEDURE — 93005 ELECTROCARDIOGRAM TRACING: CPT

## 2025-07-09 PROCEDURE — 85730 THROMBOPLASTIN TIME PARTIAL: CPT

## 2025-07-09 PROCEDURE — 86850 RBC ANTIBODY SCREEN: CPT

## 2025-07-09 PROCEDURE — 85018 HEMOGLOBIN: CPT

## 2025-07-09 PROCEDURE — 87086 URINE CULTURE/COLONY COUNT: CPT

## 2025-07-09 PROCEDURE — C1729: CPT

## 2025-07-09 PROCEDURE — 84100 ASSAY OF PHOSPHORUS: CPT

## 2025-07-09 PROCEDURE — 76775 US EXAM ABDO BACK WALL LIM: CPT

## 2025-07-09 PROCEDURE — 84300 ASSAY OF URINE SODIUM: CPT

## 2025-07-09 RX ORDER — LANOLIN/MINERAL OIL/PETROLATUM
1 OINTMENT (GRAM) OPHTHALMIC (EYE) AT BEDTIME
Refills: 0 | Status: DISCONTINUED | OUTPATIENT
Start: 2025-07-09 | End: 2025-07-18

## 2025-07-09 RX ADMIN — Medication 1 APPLICATION(S): at 22:33

## 2025-07-09 RX ADMIN — Medication 81 MILLIGRAM(S): at 11:18

## 2025-07-09 RX ADMIN — Medication 1 APPLICATION(S): at 05:31

## 2025-07-09 RX ADMIN — INSULIN LISPRO 1: 100 INJECTION, SOLUTION INTRAVENOUS; SUBCUTANEOUS at 08:53

## 2025-07-09 RX ADMIN — Medication 650 MILLIGRAM(S): at 21:14

## 2025-07-09 RX ADMIN — Medication 2 TABLET(S): at 21:14

## 2025-07-09 RX ADMIN — INSULIN LISPRO 2: 100 INJECTION, SOLUTION INTRAVENOUS; SUBCUTANEOUS at 17:37

## 2025-07-09 RX ADMIN — POLYETHYLENE GLYCOL 3350 17 GRAM(S): 17 POWDER, FOR SOLUTION ORAL at 11:18

## 2025-07-09 RX ADMIN — Medication 650 MILLIGRAM(S): at 14:02

## 2025-07-09 RX ADMIN — Medication 650 MILLIGRAM(S): at 05:31

## 2025-07-09 RX ADMIN — CEFTRIAXONE 100 MILLIGRAM(S): 500 INJECTION, POWDER, FOR SOLUTION INTRAMUSCULAR; INTRAVENOUS at 19:58

## 2025-07-09 RX ADMIN — Medication 60 MILLIGRAM(S): at 05:31

## 2025-07-09 RX ADMIN — METOPROLOL SUCCINATE 50 MILLIGRAM(S): 50 TABLET, EXTENDED RELEASE ORAL at 05:30

## 2025-07-09 RX ADMIN — INSULIN LISPRO 3: 100 INJECTION, SOLUTION INTRAVENOUS; SUBCUTANEOUS at 12:39

## 2025-07-09 RX ADMIN — ATOVAQUONE 1500 MILLIGRAM(S): 750 SUSPENSION ORAL at 21:14

## 2025-07-09 RX ADMIN — PREDNISONE 5 MILLIGRAM(S): 20 TABLET ORAL at 05:30

## 2025-07-09 RX ADMIN — ATORVASTATIN CALCIUM 10 MILLIGRAM(S): 80 TABLET, FILM COATED ORAL at 21:14

## 2025-07-09 RX ADMIN — TAMSULOSIN HYDROCHLORIDE 0.4 MILLIGRAM(S): 0.4 CAPSULE ORAL at 21:14

## 2025-07-09 NOTE — PROGRESS NOTE ADULT - ASSESSMENT
75 yo M PMH ESRD s/p Kidney Transplant Recipient (2018) c/b hydronephrosis in transplant kidney managed with ureteral stent exchanges (last exchange per chart review 02/2025 with Dr. 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

## 2025-07-09 NOTE — CONSULT NOTE ADULT - ASSESSMENT
74 year old Male with PMHx of  ESRD on HD via L A/V fistula (~[id number]) s/p Kidney Transplant Recipient (2018) c/b hydronephrosis in transplant kidney managed with ureteral stent exchanges (last exchange per chart review 02/2025 with Dr. 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     # CHELA on admission, hydronephrosis with malpositioned ureteral stent  s/p transplant kidney (Right) nephrostomy tube placement  on 7/7 with IR   PMHx of ESRD, kidney transplant recipient in 2018, Hx of hydronephrosis treated with stent, last exchange in 02/2025   Hx of HCV (treated with Epclusa), Prostate Ca s/p RT and TURP   HTN, T2DM    - PCN - placed on 7/7  - UA on July 8 from nephrostomy tube: proteinuria,  (+ increased RBC)  (UA on July 1 was wnl, no pyuria)   Culture Results:   >100,000 CFU/ml Proteus mirabilis (07.07.25 @ 13:23)    - scheduled for stent retrieval on 08/15,  - VS stable   - no leukocytosis   - Cr 6.44, GFR 8 , AG 18 , carb CD 22 (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    - MRSA screening negative     Treatment: Ceftriaxone,     Prevention: Atovaquone     Immunosuppression prednisone   Tacrolimus level 19 (WNL)     HD on 07/07 74 year old Male with PMHx of  ESRD on HD via L A/V fistula (~[id number]) s/p Kidney Transplant Recipient (2018) c/b hydronephrosis in transplant kidney managed with ureteral stent exchanges (last exchange per chart review 02/2025 with Dr. 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  PMHx of ESRD, kidney transplant recipient in 2018, Hx of hydronephrosis treated with stent, last exchange in 02/2025   Hx of HCV (treated with Epclusa), Prostate Ca s/p RT and TURP   HTN, T2DM    - right side PCN - placed on 7/7  - UA on July 8 from nephrostomy tube: proteinuria,  (+ increased RBC)  (UA on July 1 was wnl, no pyuria)   Culture Results:   >100,000 CFU/ml Proteus mirabilis (07.07.25 @ 13:23)    - scheduled for stent retrieval on 08/15, will be on antibiotic terapy likely until stent removal due to high risk of infection reoccurrence   - VS stable   - no leukocytosis   - Cr 6.44, GFR 8 , AG 18 , carb CD 22 (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    - MRSA screening negative     Treatment: Ceftriaxone,     Prevention: Atovaquone ?    Immunosuppression prednisone +  Tacrolimus level 19 (WNL)     Last HD on 07/07 (started this admission) Left UE AVF    Recommended to continue ceftriaxone, will wait for urine culture with sensitivity. Ideally stent should be removed before antibiotic discontinuation.   74 year old Male with PMHx of  ESRD on HD via L A/V fistula (~1750-3745) s/p Kidney Transplant Recipient (2018) c/b hydronephrosis in transplant kidney managed with ureteral stent exchanges (last exchange per chart review 02/2025 with Dr. 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  PMHx of ESRD, kidney transplant recipient in 2018, Hx of hydronephrosis treated with stent, last exchange in 02/2025   Hx of HCV (treated with Epclusa), Prostate Ca s/p RT and TURP   HTN, T2DM    - right side PCN - placed on 7/7  - UA on July 8 from nephrostomy tube: proteinuria,  (+ increased RBC)  (UA on July 1 was wnl, no pyuria)   Culture Results:   >100,000 CFU/ml Proteus mirabilis (07.07.25 @ 13:23)    - scheduled for stent retrieval on 08/15, will be on antibiotic therapy likely until stent removal due to high risk of infection reoccurrence   - VS stable   - no leukocytosis   - Cr 6.44, GFR 8 , AG 18 , carb CD 22 (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    - MRSA screening negative     Treatment: Ceftriaxone,     Prevention: Atovaquone ?    Immunosuppression prednisone +  Tacrolimus level 19 (WNL)     Last HD on 07/07 (started this admission) Left UE AVF    Recommended to continue ceftriaxone, will wait for urine culture with sensitivity. Ideally stent should be removed before antibiotic discontinuation.

## 2025-07-09 NOTE — CONSULT NOTE ADULT - ATTENDING COMMENTS
duplex to eval left AVF
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  Evaluation revealed the distal portion of the stent curl completely retracted into the transplant ureter  CT scan images reviewed: Stent would not be retrievable from the retrograde approach based on the imaging findings  Suggest consultation with Interventional Radiology for possible nephrostomy tube placement versus antegrade exchange of stent
4 year old Male with PMHx of  ESRD on HD via L A/V fistula (~8403-4242) s/p Kidney Transplant Recipient (2018) c/b hydronephrosis in transplant kidney managed with ureteral stent exchanges (last exchange per chart review 02/2025 with Dr. 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   Found to have hydronephrosis s/p new Percutaneous nephrostomy tube placement on 7/7, urine from collecting system with high pyuria and proteus mirabilis, susceptible to ceftriaxone    Agree with treating for graft pyelonephritis  continue ceftriaxone for now, will be transitioned to orals at discharge.   recommend removal of displaced stent during this admission, a few days into antibiotic initiation but prior to completing therapy, as the stent is serving as nidus for recurrent infection      Thank you for involving us in the care of this patient  Transplant ID will continue to follow  Please call or page with additional questions  Pager; #[phone]  Teams: from 8 am to 5 pm  Deann Jackson MD

## 2025-07-09 NOTE — CONSULT NOTE ADULT - TIME BILLING
Kidney recipient admitted with CHELA on CKD, hyperkalemia, chronic transplant ureter stricture with stent.  Patient reports high K diet prior to admission, now K is improved .  Noted imaging showing hydronephrosis. Noted clinical, lab data.  Suggestions:  Check PVR, can consider Velez catheter to drain bladder and repeat allograft sonogram to see if hydronephrosis improves/creatinine improves  D/w sonogram with IR-bladder was full on sonogram  Low K diet  Continue out patient regimen of immunosuppression, Tacrolimus target  Hold aspirin, avoid bactrim  Lokelma to maintain serum K in normal range  Patient has left UE edema and has AVF, get vascular surgery f/u since he may need dialysis.  Patient is aware he may need dialysis if creatinine does not improve, consent for dialysis obtained.  Noted urology and Interventional radiology input.  Check Tacrolimus trough level and serial renal panel chemistry after Velez drainage  Will follow  I was present during and reviewed clinical and lab data as well as assessment and plan as documented by the house staff as noted. Please contact if any additional questions with any change in clinical condition or on availability of any additional information or reports.
face-to-face encounter, review of extensive medical records in this and prior charts, laboratory findings, radiographic and microbiology results; documentation as noted above and discussion of diagnostic impressions and plan with the patient and team

## 2025-07-09 NOTE — PROGRESS NOTE ADULT - SUBJECTIVE AND OBJECTIVE BOX
Interventional Radiology Follow-Up Note    This is a 74y Male s/p transplant kidney nephrostomy tube placement on 7/7 in Interventional Radiology with Dr. Kim.     S: Patient seen and examined @ bedside.    Medication:     aspirin  chewable: (07-08)  atovaquone  Suspension: (07-08)  cefTRIAXone   IVPB: (07-08)  metoprolol succinate ER: (07-09)  NIFEdipine XL: (07-09)    Vitals:   T(F): 97.6, Max: 98.7 (11:06)  HR: 75  BP: 150/80  RR: 18  SpO2: 97%    Physical Exam:  General: Nontoxic, in NAD, A&O x3.  Abdomen: soft, NTND, no peritoneal signs.  Drain Device: Drain intact attached to bag with slightly cloudy brown urine. Dressing clean, dry, intact. Drain noted to be kinked and was readjusted. Drain flushed with 5cc NS w/o issues, no pericatheter leakage, +fluid return noted.     24hr Drain output: 300 cc    LABS:    Assessment/Plan:  75 yo M PMH ESRD s/p Kidney Transplant Recipient (2018) c/b hydronephrosis in transplant kidney managed with ureteral stent exchanges (last exchange per chart review 02/2025 with Dr. 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 Patient is currently s/p transplant kidney nephrostomy tube placement on 7/7 in Interventional Radiology with Dr. Kim.     ~~~NOTE IN PROGRESS~~~    -regarding outpatient follow up with IR, Patient scheduled for stent retrieval with Dr. Kim on 8/15 at 10am.   -Hold asa x 5 days prior to procedure if able.   -NPO after midnight the night before procedure. ok to take morning meds with sip of water.     -continue global management per primary team  -monitor h/h; transfuse as needed  -trend vs/labs  -flush drain with 5cc NS daily forward only; DO NOT aspirate  -change dressing q3 days or when dressing is saturated  - Greater than 50% of the encounter was spent counseling and/or coordination of care on drain management and follow up.   -they will benefit from VNS service to help with drainage catheter care; they should continue same drainage catheter care as an outpatient.     Please call IR at extension [phone] with any questions, concerns, or issues regarding above.       Interventional Radiology Follow-Up Note    This is a 74y Male s/p transplant kidney nephrostomy tube placement on 7/7 in Interventional Radiology with Dr. Kim.     Multiple attempts were made to see patient however pt was not present at bedside.    Medication:     aspirin  chewable: (07-08)  atovaquone  Suspension: (07-08)  cefTRIAXone   IVPB: (07-08)  metoprolol succinate ER: (07-09)  NIFEdipine XL: (07-09)    Vitals:   T(F): 97.6, Max: 98.7 (11:06)  HR: 75  BP: 150/80  RR: 18  SpO2: 97%    24hr Drain output: 300 cc    Assessment/Plan:  73 yo M PMH ESRD s/p Kidney Transplant Recipient (2018) c/b hydronephrosis in transplant kidney managed with ureteral stent exchanges (last exchange per chart review 02/2025 with Dr. 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 Patient is currently s/p transplant kidney nephrostomy tube placement on 7/7 in Interventional Radiology with Dr. Kim.     -regarding outpatient follow up with IR, Patient scheduled for stent retrieval with Dr. Kim on 8/15 at 10am.   -Hold asa x 5 days prior to procedure if able.   -NPO after midnight the night before procedure. ok to take morning meds with sip of water.   -continue global management per primary team  -monitor h/h; transfuse as needed  -trend vs/labs  -flush drain with 5cc NS daily forward only; DO NOT aspirate  -change dressing q3 days or when dressing is saturated  -they will benefit from VNS service to help with drainage catheter care; they should continue same drainage catheter care as an outpatient.     Please call IR at extension [phone] with any questions, concerns, or issues regarding above.

## 2025-07-09 NOTE — CONSULT NOTE ADULT - SUBJECTIVE AND OBJECTIVE BOX
Patient is a 74y old  Male who presents with a chief complaint of martita (2025 13:40)    HPI:  74 year old Male with PMHx of  ESRD on HD via L A/V fistula (~0921-0764) s/p Kidney Transplant Recipient () c/b hydronephrosis in transplant kidney managed with ureteral stent exchanges (last exchange per chart review 2025 with Dr. 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 Pt currently without any complaints or symptoms. Denies chest pain, dyspnea, abdominal pain, dysuria, He has been taking his medications as prescribed without issue. In the ED, labs notable for elevated SCr to 5.57. US kidney without any significant KEREN, mild hydronephrosis noted. Pt seen by nephrology and urology. Pt admitted for further management.  (2025 02:55)       prior hospital charts reviewed [  ]  primary team notes reviewed [  ]  other consultant notes reviewed [  ]    PAST MEDICAL & SURGICAL HISTORY:  HTN (hypertension)      Type 2 diabetes mellitus  dx:       ESRD (end stage renal disease)  On Dialysis '  to 2018      Hepatitis C  In Donor Kidney: patient received treatment for Hep. C : post treatment: patient  tested Negative for Hep C      Benign prostatic hypertrophy      Anal fissure  dx: '    No surgery      Bowel obstruction  '    surgically treated w/ ileostomy; since reversed      Medial meniscus tear    Right      Renal cell carcinoma  s/p b/l nephrectomy and renal transplant in       Prostate cancer  s/p RT      Ureteral stricture of kidney transplant  2018      COVID-19 virus infection  2021, asymptomatic      Bilateral cataracts      Anemia secondary to renal failure      Urine retention      Other complication of kidney transplant      OA (osteoarthritis)      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 s/p Reversal      S/P right knee arthroscopy        Kidney transplant recipient  2018  @ [facility] :  +  Hep C Donor      H/O colonoscopy      S/P TURP (transurethral resection of prostate)  and Transplant Kidney Stent Replacemernt, 21      S/P anal fissurectomy  and chemical denervation  with biopsy and fulguration of anal lesions, 2021      Anal condyloma  S/P excision; 22      S/P total knee replacement, left          Allergies  No Known Allergies    ANTIMICROBIALS (past 90 days)  MEDICATIONS  (STANDING):    atovaquone  Suspension   1500 milliGRAM(s) Oral (25 @ 21:57)   1500 milliGRAM(s) Oral (25 @ 22:06)   1500 milliGRAM(s) Oral (25 @ 21:11)   1500 milliGRAM(s) Oral (25 @ 22:18)   1500 milliGRAM(s) Oral (25 @ 21:51)   1500 milliGRAM(s) Oral (25 @ 21:18)    cefTRIAXone   IVPB   100 mL/Hr IV Intermittent (25 @ 20:16)    trimethoprim   80 mG/sulfamethoxazole 400 mG   1 Tablet(s) Oral (25 @ 11:56)        atovaquone  Suspension 1500 daily  cefTRIAXone   IVPB 1000 every 24 hours    MEDICATIONS  (STANDING):  aspirin  chewable 81 daily  atorvastatin 10 at bedtime  dextrose 50% Injectable 25 once  dextrose 50% Injectable 12.5 once  dextrose 50% Injectable 25 once  dextrose Oral Gel 15 once PRN  glucagon  Injectable 1 once  insulin lispro (ADMELOG) corrective regimen sliding scale  three times a day before meals  insulin lispro (ADMELOG) corrective regimen sliding scale  at bedtime  metoprolol succinate ER 50 daily  NIFEdipine XL 60 daily  ondansetron Injectable 4 once PRN  polyethylene glycol 3350 17 daily  predniSONE   Tablet 5 daily  senna 2 at bedtime  tamsulosin 0.4 at bedtime    SOCIAL HISTORY:       FAMILY HISTORY:  FH: breast cancer (Mother)    FH: type 2 diabetes (Father)    FH: heart attack (Sibling)  age 54 at death      ROS:  Pending full examination    Vital Signs Last 24 Hrs  T(F): 98 (25 @ 12:06), Max: 98.9 (25 @ 20:39)  Vital Signs Last 24 Hrs  HR: 84 (25 @ 12:06) (75 - 85)  BP: 141/63 (25 @ 12:06) (124/70 - 150/80)  RR: 18 (25 @ 12:06)  SpO2: 99% (25 @ 12:06) (97% - 99%)  Wt(kg): --    PHYSICAL EXAM:  Pending full examination                          10.4   5.24  )-----------( 168      ( 2025 07:08 )             33.5   07-    135  |  95[L]  |  43[H]  ----------------------------<  140[H]  3.9   |  22  |  6.44[H]    Ca    9.1      2025 07:08      Urinalysis Basic - ( 2025 07:08 )    Color: x / Appearance: x / SG: x / pH: x  Gluc: 140 mg/dL / Ketone: x  / Bili: x / Urobili: x   Blood: x / Protein: x / Nitrite: x   Leuk Esterase: x / RBC: x / WBC x   Sq Epi: x / Non Sq Epi: x / Bacteria: x    MICROBIOLOGY:  Culture - Urine (collected 2025 13:23)  Source: Kidney Right Kidney  Preliminary Report (2025 00:06):    >100,000 CFU/ml Proteus mirabilis                  RADIOLOGY:  imaging below personally reviewed and agree with findings

## 2025-07-09 NOTE — PROGRESS NOTE ADULT - SUBJECTIVE AND OBJECTIVE BOX
Patient is a 74y old  Male who presents with a chief complaint of martita (06 Jul 2025 13:40)      SUBJECTIVE / OVERNIGHT EVENTS: pt reports some soreness around R NT site, no cp, sob    MEDICATIONS  (STANDING):  aspirin  chewable 81 milliGRAM(s) Oral daily  atorvastatin 10 milliGRAM(s) Oral at bedtime  atovaquone  Suspension 1500 milliGRAM(s) Oral daily  cefTRIAXone   IVPB 1000 milliGRAM(s) IV Intermittent every 24 hours  chlorhexidine 2% Cloths 1 Application(s) Topical daily  dextrose 5%. 1000 milliLiter(s) (100 mL/Hr) IV Continuous <Continuous>  dextrose 50% Injectable 25 Gram(s) IV Push once  dextrose 50% Injectable 12.5 Gram(s) IV Push once  dextrose 50% Injectable 25 Gram(s) IV Push once  glucagon  Injectable 1 milliGRAM(s) IntraMuscular once  insulin lispro (ADMELOG) corrective regimen sliding scale   SubCutaneous three times a day before meals  insulin lispro (ADMELOG) corrective regimen sliding scale   SubCutaneous at bedtime  metoprolol succinate ER 50 milliGRAM(s) Oral daily  NIFEdipine XL 60 milliGRAM(s) Oral daily  petrolatum Ophthalmic Ointment 1 Application(s) Both EYES at bedtime  polyethylene glycol 3350 17 Gram(s) Oral daily  predniSONE   Tablet 5 milliGRAM(s) Oral daily  senna 2 Tablet(s) Oral at bedtime  sodium bicarbonate 650 milliGRAM(s) Oral every 8 hours  tamsulosin 0.4 milliGRAM(s) Oral at bedtime    MEDICATIONS  (PRN):  dextrose Oral Gel 15 Gram(s) Oral once PRN Blood Glucose LESS THAN 70 milliGRAM(s)/deciliter  ondansetron Injectable 4 milliGRAM(s) IV Push once PRN Nausea and/or Vomiting  sodium chloride 0.9% Bolus. 100 milliLiter(s) IV Bolus every 5 minutes PRN SBP LESS THAN or EQUAL to 90 mmHg        CAPILLARY BLOOD GLUCOSE      POCT Blood Glucose.: 274 mg/dL (09 Jul 2025 12:29)  POCT Blood Glucose.: 181 mg/dL (09 Jul 2025 08:31)  POCT Blood Glucose.: 156 mg/dL (08 Jul 2025 21:20)  POCT Blood Glucose.: 220 mg/dL (08 Jul 2025 17:31)    I&O's Summary    08 Jul 2025 07:01  -  09 Jul 2025 07:00  --------------------------------------------------------  IN: 0 mL / OUT: 700 mL / NET: -700 mL        PHYSICAL EXAM:  GENERAL: NAD, well-developed  HEAD:  Atraumatic, Normocephalic  EYES: conjunctiva and sclera clear  NECK: Supple, No JVD  CHEST/LUNG: Clear to auscultation bilaterally; No wheeze  HEART: Regular rate and rhythm; No murmurs, rubs, or gallops  ABDOMEN: Soft, Nontender, Nondistended; Bowel sounds present +R NT with browinsh drainage   EXTREMITIES:  2+ Peripheral Pulses, No clubbing, cyanosis, or edema  PSYCH: AAOx3      LABS:                        10.4   5.24  )-----------( 168      ( 09 Jul 2025 07:08 )             33.5     07-09    135  |  95[L]  |  43[H]  ----------------------------<  140[H]  3.9   |  22  |  6.44[H]    Ca    9.1      09 Jul 2025 07:08            Urinalysis Basic - ( 09 Jul 2025 07:08 )    Color: x / Appearance: x / SG: x / pH: x  Gluc: 140 mg/dL / Ketone: x  / Bili: x / Urobili: x   Blood: x / Protein: x / Nitrite: x   Leuk Esterase: x / RBC: x / WBC x   Sq Epi: x / Non Sq Epi: x / Bacteria: x        RADIOLOGY & ADDITIONAL TESTS:    Imaging Personally Reviewed:    Consultant(s) Notes Reviewed:      Care Discussed with Consultants/Other Providers:

## 2025-07-10 ENCOUNTER — APPOINTMENT (OUTPATIENT)
Dept: VASCULAR SURGERY | Facility: CLINIC | Age: 75
End: 2025-07-10

## 2025-07-10 ENCOUNTER — TRANSCRIPTION ENCOUNTER (OUTPATIENT)
Age: 75
End: 2025-07-10

## 2025-07-10 DIAGNOSIS — N12 TUBULO-INTERSTITIAL NEPHRITIS, NOT SPECIFIED AS ACUTE OR CHRONIC: ICD-10-CM

## 2025-07-10 LAB
ANION GAP SERPL CALC-SCNC: 17 MMOL/L — SIGNIFICANT CHANGE UP (ref 5–17)
BUN SERPL-MCNC: 49 MG/DL — HIGH (ref 7–23)
CALCIUM SERPL-MCNC: 9.3 MG/DL — SIGNIFICANT CHANGE UP (ref 8.4–10.5)
CHLORIDE SERPL-SCNC: 96 MMOL/L — SIGNIFICANT CHANGE UP (ref 96–108)
CO2 SERPL-SCNC: 23 MMOL/L — SIGNIFICANT CHANGE UP (ref 22–31)
CREAT SERPL-MCNC: 5.72 MG/DL — HIGH (ref 0.5–1.3)
CULTURE RESULTS: SIGNIFICANT CHANGE UP
EGFR: 10 ML/MIN/1.73M2 — LOW
EGFR: 10 ML/MIN/1.73M2 — LOW
GLUCOSE BLDC GLUCOMTR-MCNC: 148 MG/DL — HIGH (ref 70–99)
GLUCOSE BLDC GLUCOMTR-MCNC: 192 MG/DL — HIGH (ref 70–99)
GLUCOSE BLDC GLUCOMTR-MCNC: 207 MG/DL — HIGH (ref 70–99)
GLUCOSE BLDC GLUCOMTR-MCNC: 241 MG/DL — HIGH (ref 70–99)
GLUCOSE BLDC GLUCOMTR-MCNC: 92 MG/DL — SIGNIFICANT CHANGE UP (ref 70–99)
GLUCOSE SERPL-MCNC: 149 MG/DL — HIGH (ref 70–99)
HCT VFR BLD CALC: 33.3 % — LOW (ref 39–50)
HGB BLD-MCNC: 10.4 G/DL — LOW (ref 13–17)
MCHC RBC-ENTMCNC: 27.2 PG — SIGNIFICANT CHANGE UP (ref 27–34)
MCHC RBC-ENTMCNC: 31.2 G/DL — LOW (ref 32–36)
MCV RBC AUTO: 87.2 FL — SIGNIFICANT CHANGE UP (ref 80–100)
NRBC # BLD AUTO: 0 K/UL — SIGNIFICANT CHANGE UP (ref 0–0)
NRBC # FLD: 0 K/UL — SIGNIFICANT CHANGE UP (ref 0–0)
NRBC BLD AUTO-RTO: 0 /100 WBCS — SIGNIFICANT CHANGE UP (ref 0–0)
PLATELET # BLD AUTO: 179 K/UL — SIGNIFICANT CHANGE UP (ref 150–400)
PMV BLD: 9.7 FL — SIGNIFICANT CHANGE UP (ref 7–13)
POTASSIUM SERPL-MCNC: 4.3 MMOL/L — SIGNIFICANT CHANGE UP (ref 3.5–5.3)
POTASSIUM SERPL-SCNC: 4.3 MMOL/L — SIGNIFICANT CHANGE UP (ref 3.5–5.3)
RBC # BLD: 3.82 M/UL — LOW (ref 4.2–5.8)
RBC # FLD: 14.6 % — HIGH (ref 10.3–14.5)
SODIUM SERPL-SCNC: 136 MMOL/L — SIGNIFICANT CHANGE UP (ref 135–145)
SPECIMEN SOURCE: SIGNIFICANT CHANGE UP
TACROLIMUS SERPL-MCNC: 4.7 NG/ML — SIGNIFICANT CHANGE UP
WBC # BLD: 5.7 K/UL — SIGNIFICANT CHANGE UP (ref 3.8–10.5)
WBC # FLD AUTO: 5.7 K/UL — SIGNIFICANT CHANGE UP (ref 3.8–10.5)

## 2025-07-10 PROCEDURE — 99233 SBSQ HOSP IP/OBS HIGH 50: CPT

## 2025-07-10 PROCEDURE — 93971 EXTREMITY STUDY: CPT | Mod: 26,LT

## 2025-07-10 PROCEDURE — G0545: CPT

## 2025-07-10 PROCEDURE — 99231 SBSQ HOSP IP/OBS SF/LOW 25: CPT

## 2025-07-10 RX ORDER — INSULIN GLARGINE-YFGN 100 [IU]/ML
16 INJECTION, SOLUTION SUBCUTANEOUS AT BEDTIME
Refills: 0 | Status: DISCONTINUED | OUTPATIENT
Start: 2025-07-10 | End: 2025-07-16

## 2025-07-10 RX ORDER — INSULIN LISPRO 100 U/ML
4 INJECTION, SOLUTION INTRAVENOUS; SUBCUTANEOUS
Refills: 0 | Status: DISCONTINUED | OUTPATIENT
Start: 2025-07-10 | End: 2025-07-18

## 2025-07-10 RX ORDER — ASPIRIN 325 MG
81 TABLET ORAL DAILY
Refills: 0 | Status: DISCONTINUED | OUTPATIENT
Start: 2025-07-10 | End: 2025-07-12

## 2025-07-10 RX ADMIN — METOPROLOL SUCCINATE 50 MILLIGRAM(S): 50 TABLET, EXTENDED RELEASE ORAL at 05:54

## 2025-07-10 RX ADMIN — INSULIN LISPRO 4 UNIT(S): 100 INJECTION, SOLUTION INTRAVENOUS; SUBCUTANEOUS at 17:37

## 2025-07-10 RX ADMIN — CEFTRIAXONE 100 MILLIGRAM(S): 500 INJECTION, POWDER, FOR SOLUTION INTRAMUSCULAR; INTRAVENOUS at 17:53

## 2025-07-10 RX ADMIN — INSULIN LISPRO 1: 100 INJECTION, SOLUTION INTRAVENOUS; SUBCUTANEOUS at 08:45

## 2025-07-10 RX ADMIN — Medication 1 APPLICATION(S): at 22:29

## 2025-07-10 RX ADMIN — Medication 2 TABLET(S): at 22:31

## 2025-07-10 RX ADMIN — INSULIN GLARGINE-YFGN 16 UNIT(S): 100 INJECTION, SOLUTION SUBCUTANEOUS at 22:29

## 2025-07-10 RX ADMIN — ATORVASTATIN CALCIUM 10 MILLIGRAM(S): 80 TABLET, FILM COATED ORAL at 22:31

## 2025-07-10 RX ADMIN — ATOVAQUONE 1500 MILLIGRAM(S): 750 SUSPENSION ORAL at 22:30

## 2025-07-10 RX ADMIN — Medication 650 MILLIGRAM(S): at 05:54

## 2025-07-10 RX ADMIN — POLYETHYLENE GLYCOL 3350 17 GRAM(S): 17 POWDER, FOR SOLUTION ORAL at 13:21

## 2025-07-10 RX ADMIN — Medication 1 APPLICATION(S): at 05:54

## 2025-07-10 RX ADMIN — Medication 60 MILLIGRAM(S): at 05:54

## 2025-07-10 RX ADMIN — INSULIN LISPRO 2: 100 INJECTION, SOLUTION INTRAVENOUS; SUBCUTANEOUS at 13:27

## 2025-07-10 RX ADMIN — Medication 650 MILLIGRAM(S): at 22:30

## 2025-07-10 RX ADMIN — Medication 650 MILLIGRAM(S): at 13:21

## 2025-07-10 RX ADMIN — INSULIN LISPRO 2: 100 INJECTION, SOLUTION INTRAVENOUS; SUBCUTANEOUS at 17:38

## 2025-07-10 RX ADMIN — TAMSULOSIN HYDROCHLORIDE 0.4 MILLIGRAM(S): 0.4 CAPSULE ORAL at 22:30

## 2025-07-10 RX ADMIN — PREDNISONE 5 MILLIGRAM(S): 20 TABLET ORAL at 05:54

## 2025-07-10 NOTE — PROGRESS NOTE ADULT - SUBJECTIVE AND OBJECTIVE BOX
Patient is a 74y old  Male who presents with a chief complaint of pyelonephritis (10 Jul 2025 09:45)      SUBJECTIVE / OVERNIGHT EVENTS: pt with some soreness around R NT site, no cp, sob, chills     MEDICATIONS  (STANDING):  aspirin  chewable 81 milliGRAM(s) Oral daily  atorvastatin 10 milliGRAM(s) Oral at bedtime  atovaquone  Suspension 1500 milliGRAM(s) Oral daily  cefTRIAXone   IVPB 1000 milliGRAM(s) IV Intermittent every 24 hours  chlorhexidine 2% Cloths 1 Application(s) Topical daily  dextrose 5%. 1000 milliLiter(s) (100 mL/Hr) IV Continuous <Continuous>  dextrose 50% Injectable 25 Gram(s) IV Push once  dextrose 50% Injectable 12.5 Gram(s) IV Push once  dextrose 50% Injectable 25 Gram(s) IV Push once  glucagon  Injectable 1 milliGRAM(s) IntraMuscular once  insulin glargine Injectable (LANTUS) 16 Unit(s) SubCutaneous at bedtime  insulin lispro (ADMELOG) corrective regimen sliding scale   SubCutaneous three times a day before meals  insulin lispro (ADMELOG) corrective regimen sliding scale   SubCutaneous at bedtime  insulin lispro Injectable (ADMELOG) 4 Unit(s) SubCutaneous three times a day before meals  metoprolol succinate ER 50 milliGRAM(s) Oral daily  NIFEdipine XL 60 milliGRAM(s) Oral daily  petrolatum Ophthalmic Ointment 1 Application(s) Both EYES at bedtime  polyethylene glycol 3350 17 Gram(s) Oral daily  predniSONE   Tablet 5 milliGRAM(s) Oral daily  senna 2 Tablet(s) Oral at bedtime  sodium bicarbonate 650 milliGRAM(s) Oral every 8 hours  tamsulosin 0.4 milliGRAM(s) Oral at bedtime    MEDICATIONS  (PRN):  dextrose Oral Gel 15 Gram(s) Oral once PRN Blood Glucose LESS THAN 70 milliGRAM(s)/deciliter  ondansetron Injectable 4 milliGRAM(s) IV Push once PRN Nausea and/or Vomiting  sodium chloride 0.9% Bolus. 100 milliLiter(s) IV Bolus every 5 minutes PRN SBP LESS THAN or EQUAL to 90 mmHg        CAPILLARY BLOOD GLUCOSE      POCT Blood Glucose.: 241 mg/dL (10 Jul 2025 13:25)  POCT Blood Glucose.: 192 mg/dL (10 Jul 2025 08:33)  POCT Blood Glucose.: 215 mg/dL (09 Jul 2025 21:29)  POCT Blood Glucose.: 221 mg/dL (09 Jul 2025 17:10)    I&O's Summary    09 Jul 2025 07:01  -  10 Jul 2025 07:00  --------------------------------------------------------  IN: 160 mL / OUT: 2500 mL / NET: -2340 mL        PHYSICAL EXAM:  GENERAL: NAD, well-developed  HEAD:  Atraumatic, Normocephalic  EYES: conjunctiva and sclera clear  NECK: Supple, No JVD  CHEST/LUNG: Clear to auscultation bilaterally; No wheeze  HEART: Regular rate and rhythm; No murmurs, rubs, or gallops  ABDOMEN: Soft, Nontender, Nondistended; Bowel sounds present +R NT with browinsh drainage   EXTREMITIES:  2+ Peripheral Pulses, No clubbing, cyanosis, or edema  PSYCH: AAOx3    LABS:                        10.4   5.70  )-----------( 179      ( 10 Jul 2025 07:04 )             33.3     07-10    136  |  96  |  49[H]  ----------------------------<  149[H]  4.3   |  23  |  5.72[H]    Ca    9.3      10 Jul 2025 07:04            Urinalysis Basic - ( 10 Jul 2025 07:04 )    Color: x / Appearance: x / SG: x / pH: x  Gluc: 149 mg/dL / Ketone: x  / Bili: x / Urobili: x   Blood: x / Protein: x / Nitrite: x   Leuk Esterase: x / RBC: x / WBC x   Sq Epi: x / Non Sq Epi: x / Bacteria: x        RADIOLOGY & ADDITIONAL TESTS:    Imaging Personally Reviewed:    Consultant(s) Notes Reviewed:      Care Discussed with Consultants/Other Providers:

## 2025-07-10 NOTE — PROGRESS NOTE ADULT - PROBLEM SELECTOR PLAN 1
- CHELA on CKD 2/2 malpositioned ureteral stent  - S/p RNT placement 7/7   - UA+, UCx + proteus   - C/w IV ceftriaxone    - Bactrim changed to mepron for ppx  - Sodium bicarb TID  - Monitor urine output, I/Os, Cr  - serial bladder scans for PVR

## 2025-07-10 NOTE — PROGRESS NOTE ADULT - ASSESSMENT
Assessment and Recommendation:   · Assessment	  74 year old Male with PMHx of  ESRD on HD via L A/V fistula (~[id number]) s/p Kidney Transplant Recipient (2018) c/b hydronephrosis in transplant kidney managed with ureteral stent exchanges (last exchange per chart review 02/2025 with Dr. 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  PMHx of ESRD, kidney transplant recipient in 2018, Hx of hydronephrosis treated with stent, last exchange in 02/2025   Hx of HCV (treated with Epclusa), Prostate Ca s/p RT and TURP   HTN, T2DM    - right side PCN - placed on 7/7  - UA on July 8 from nephrostomy tube: proteinuria,  (+ increased RBC)  (UA on July 1 was wnl, no pyuria)   Culture Results:   >100,000 CFU/ml Proteus mirabilis (07.07.25 @ 13:23)    - scheduled for stent retrieval on 08/15, will be on antibiotic therapy likely until stent removal due to high risk of infection reoccurrence   - VS stable   - no leukocytosis   - Cr 6.44, GFR 8 , AG 18 , carb CD 22 (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    - MRSA screening negative     Treatment: Ceftriaxone,     Prevention: Atovaquone ?    Immunosuppression prednisone +  Tacrolimus level 19 (WNL)    Assessment and Recommendation:   · Assessment	  74 year old Male with PMHx of  ESRD on HD via L A/V fistula (~[id number]) s/p Kidney Transplant Recipient (2018) c/b hydronephrosis in transplant kidney managed with ureteral stent exchanges (last exchange per chart review 02/2025 with Dr. 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  PMHx of ESRD, kidney transplant recipient in 2018, Hx of hydronephrosis treated with stent, last exchange in 02/2025   Hx of HCV (treated with Epclusa), Prostate Ca s/p RT and TURP   HTN, T2DM    - right side PCN - placed on 7/7 due to hydronephrosis with some improvement as per renal US on 7/9   - UA on July 8 from nephrostomy tube: proteinuria,  (+ increased RBC)  (UA on July 1 was wnl, no pyuria)   Culture Results:   >100,000 CFU/ml Proteus mirabilis (07.07.25 @ 13:23)    - sensitivity is back and sensitive to cipro, levo and ceftriaxone     - discussed with urology team - plan for stent removal in on 7/17 to decrease the risk of infection reoccurance and antibiotic resistance which can be induced with prolong antibiotic treatment     - VS stable   - no leukocytosis   - Cr 6.44, GFR 8 , AG 18 , carb CD 22 (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    - MRSA screening negative     Treatment: Ceftriaxone,     Prevention: Atovaquone     Immunosuppression: on prednisone   Tacrolimus was held      # Failing renal graft , on HD and transplant nephrologist is following. AVF left UE - lymphedema for last 6 month - vascular is following        Assessment and Recommendation:   · Assessment	  74 year old Male with PMHx of  ESRD on HD via L A/V fistula (~[id number]) s/p Kidney Transplant Recipient (2018) c/b hydronephrosis in transplant kidney managed with ureteral stent exchanges (last exchange per chart review 02/2025 with Dr. 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  PMHx of ESRD, kidney transplant recipient in 2018, Hx of hydronephrosis treated with stent, last exchange in 02/2025   Hx of HCV (treated with Epclusa), Prostate Ca s/p RT and TURP   HTN, T2DM    - right side PCN - placed on 7/7 due to hydronephrosis with some improvement as per renal US on 7/9   - UA on July 8 from nephrostomy tube: proteinuria,  (+ increased RBC)  (UA on July 1 was wnl, no pyuria)   Culture Results:   >100,000 CFU/ml Proteus mirabilis (07.07.25 @ 13:23)    - sensitivity is back and sensitive to cipro, levo and ceftriaxone     - discussed with urology team - plan for stent removal in on 7/17 to decrease the risk of infection reoccurance and antibiotic resistance which can be induced with prolong antibiotic treatment     - VS stable   - no leukocytosis   - Cr 6.44, GFR 8 , AG 18 , carb CD 22 (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    - MRSA screening negative     Treatment: Ceftriaxone, will recommend to continue, pending ureteral stent retrieval     Prevention: Atovaquone - ? can be discontinued     Immunosuppression: on prednisone   Tacrolimus was held      # Failing renal graft , on HD and transplant nephrologist is following. AVF left UE - lymphedema for last 6 month - vascular is following

## 2025-07-10 NOTE — PROGRESS NOTE ADULT - SUBJECTIVE AND OBJECTIVE BOX
Follow Up:      Interval History/ROS:  Patient is a 74y old  Male who presents with a chief complaint of martita (06 Jul 2025 13:40)      REVIEW OF SYSTEMS  Constitutional: No fevers, chills, weight loss or fatigue   Skin: No rash, no phlebitis	  Eyes: No discharge	  ENMT: No sore throat, oral thrush, ulcers or exudate  Respiratory: No cough, no SOB  Cardiovascular:  No chest pain, palpitations or edema   Gastrointestinal: No pain, nausea, vomiting, diarrhea or constipation	  Genitourinary: No dysuria, discharge or flank pain  MSK: No arthralgias or back pain   Neurological: No HA, no weakness, no seizures, no AMS       Allergies  No Known Allergies        ANTIMICROBIALS:    atovaquone  Suspension 1500 daily  cefTRIAXone   IVPB 1000 every 24 hours      OTHER MEDS: MEDICATIONS  (STANDING):  aspirin  chewable 81 daily  atorvastatin 10 at bedtime  dextrose 50% Injectable 25 once  dextrose 50% Injectable 12.5 once  dextrose 50% Injectable 25 once  dextrose Oral Gel 15 once PRN  glucagon  Injectable 1 once  insulin lispro (ADMELOG) corrective regimen sliding scale  three times a day before meals  insulin lispro (ADMELOG) corrective regimen sliding scale  at bedtime  metoprolol succinate ER 50 daily  NIFEdipine XL 60 daily  ondansetron Injectable 4 once PRN  polyethylene glycol 3350 17 daily  predniSONE   Tablet 5 daily  senna 2 at bedtime  tamsulosin 0.4 at bedtime      Vital Signs Last 24 Hrs  T(F): 97.7 (07-10-25 @ 04:29), Max: 98.9 (07-05-25 @ 20:39)    Vital Signs Last 24 Hrs  HR: 69 (07-10-25 @ 04:29) (69 - 86)  BP: 145/76 (07-10-25 @ 04:29) (140/74 - 145/76)  RR: 18 (07-10-25 @ 04:29)  SpO2: 99% (07-10-25 @ 04:29) (99% - 99%)  Wt(kg): --    EXAM:  General: Patient in no acute distress   HEENT: NCAT, EOMI, PERRL, no oral lesions  CV: S1+S2, no m/r/g appreciated   Lungs: No respiratory distress, CTAB  Abd: Soft, nontender, no guarding, no rebound tenderness, + bowel sounds   Ext: No cyanosis, no edema  Neuro: Alert and oriented, no focal deficits, CN II-XII grossly intact   Skin: No rash   IV: No phlebitis      Labs:                        10.4   5.70  )-----------( 179      ( 10 Jul 2025 07:04 )             33.3     07-10    136  |  96  |  49[H]  ----------------------------<  149[H]  4.3   |  23  |  5.72[H]    Ca    9.3      10 Jul 2025 07:04        WBC Trend:  WBC Count: 5.70 (07-10-25 @ 07:04)  WBC Count: 5.55 (07-09-25 @ 22:52)  WBC Count: 5.24 (07-09-25 @ 07:08)  WBC Count: 8.98 (07-08-25 @ 05:43)      Creatine Trend:  Creatinine: 5.72 (07-10)  Creatinine: 6.44 (07-09)  Creatinine: 5.94 (07-08)  Creatinine: 6.75 (07-07)      Liver Biochemical Testing Trend:  Alanine Aminotransferase (ALT/SGPT): 17 (07-01)  Alanine Aminotransferase (ALT/SGPT): 7 *L* (12-19)  Alanine Aminotransferase (ALT/SGPT): 9 *L* (12-18)  Alanine Aminotransferase (ALT/SGPT): 8 *L* (12-17)  Aspartate Aminotransferase (AST/SGOT): 32 (07-01-25 @ 16:43)  Aspartate Aminotransferase (AST/SGOT): 15 (12-19-24 @ 07:12)  Aspartate Aminotransferase (AST/SGOT): 20 (12-18-24 @ 05:54)  Aspartate Aminotransferase (AST/SGOT): 19 (12-17-24 @ 16:14)  Bilirubin Total: 0.3 (07-01)  Bilirubin Total: 0.3 (12-19)  Bilirubin Total: 0.3 (12-18)  Bilirubin Total: 0.2 (12-17)      Trend LDH      Urinalysis Basic - ( 10 Jul 2025 07:04 )    Color: x / Appearance: x / SG: x / pH: x  Gluc: 149 mg/dL / Ketone: x  / Bili: x / Urobili: x   Blood: x / Protein: x / Nitrite: x   Leuk Esterase: x / RBC: x / WBC x   Sq Epi: x / Non Sq Epi: x / Bacteria: x        MICROBIOLOGY:    MRSA PCR Result.: Shilpi (07-08-25 @ 06:57)      Culture - Urine (collected 07 Jul 2025 13:23)  Source: Kidney Right Kidney  Final Report:    >100,000 CFU/ml Proteus mirabilis  Organism: Proteus mirabilis  Organism: Proteus mirabilis    Sensitivities:      -  Levofloxacin: S <=0.5      -  Tobramycin: S <=2      -  Aztreonam: S <=4      -  Gentamicin: S <=2      -  Cefepime: S <=2      -  Cefazolin: I 4 For uncomplicated UTI with K. pneumoniae, E. coli, or P. mirablis: AUTUMN <=16 is sensitive and AUTUMN >=32 is resistant. This also predicts results for oral agents cefaclor, cefdinir, cefpodoxime, cefprozil, cefuroxime axetil, cephalexin and locarbef for uncomplicated UTI. Note that some isolates may be susceptible to these agents while testing resistant to cefazolin.      -  Piperacillin/Tazobactam: S <=8      -  Ciprofloxacin: S <=0.25      -  Ceftriaxone: S <=1      -  Ampicillin: R >16 These ampicillin results predict results for amoxicillin      Method Type: AUTUMN      -  Meropenem: S <=1      -  Ampicillin/Sulbactam: S 8/4      -  Cefoxitin: S <=8      -  Cefuroxime: S <=4      -  Amoxicillin/Clavulanic Acid: S <=8/4      -  Trimethoprim/Sulfamethoxazole: R >2/38      -  Ertapenem: S <=0.5    Culture - Urine (collected 01 Jul 2025 21:18)  Source: Catheterized Catheterized  Final Report:    <10,000 CFU/mL Normal Urogenital Rae    Culture - Urine (collected 30 Jan 2025 20:14)  Source: Clean Catch Clean Catch (Midstream)  Final Report:    >100,000 CFU/ml Enterococcus faecium  Organism: Enterococcus faecium  Organism: Enterococcus faecium    Sensitivities:      -  Levofloxacin: R >4      -  Nitrofurantoin: S <=32 Should not be used to treat pyelonephritis.      -  Vancomycin: S 0.5      -  Ciprofloxacin: R >2      -  Ampicillin: R >8 Predicts results to ampicillin/sulbactam, amoxacillin-clavulanate and  piperacillin-tazobactam.      -  Tetracycline: R >8      Method Type: AUTUMN    Culture - Blood (collected 23 Dec 2024 09:17)  Source: .Blood BLOOD  Final Report:    No growth at 5 days    Culture - Urine (collected 18 Dec 2024 01:51)  Source: Clean Catch Clean Catch (Midstream)  Final Report:    >100,000 CFU/ml Klebsiella pneumoniae    Multiple Morphological Strains  Organism: Klebsiella pneumoniae  Klebsiella pneumoniae  Organism: Klebsiella pneumoniae    Sensitivities:      -  Levofloxacin: S <=0.5      -  Tobramycin: S <=2      -  Nitrofurantoin: I 64 Should not be used to treat pyelonephritis      -  Aztreonam: S <=4      -  Gentamicin: S <=2      -  Cefazolin: S <=2 For uncomplicated UTI with K. pneumoniae, E. coli, or P. mirablis: AUTUMN <=16 is sensitive and AUTUMN >=32 is resistant. This also predicts results for oral agents cefaclor, cefdinir, cefpodoxime, cefprozil, cefuroxime axetil, cephalexin and locarbef for uncomplicated UTI. Note that some isolates may be susceptible to these agents while testing resistant to cefazolin.      -  Cefepime: S <=2      -  Piperacillin/Tazobactam: S <=8      -  Ciprofloxacin: S <=0.25      -  Imipenem: S <=1      -  Ceftriaxone: S <=1      -  Ampicillin: R 16 These ampicillin results predict results for amoxicillin      Method Type: AUTUMN      -  Meropenem: S <=1      -  Ampicillin/Sulbactam: S <=4/2      -  Cefoxitin: S <=8      -  Cefuroxime: S <=4      -  Amoxicillin/Clavulanic Acid: S <=8/4      -  Trimethoprim/Sulfamethoxazole: R >2/38      -  Ertapenem: S <=0.5  Organism: Klebsiella pneumoniae    Sensitivities:      -  Levofloxacin: S <=0.5      -  Tobramycin: S <=2      -  Nitrofurantoin: S <=32 Should not be used to treat pyelonephritis      -  Aztreonam: S <=4      -  Gentamicin: S <=2      -  Cefazolin: S <=2 For uncomplicated UTI with K. pneumoniae, E. coli, or P. mirablis: AUTUMN <=16 is sensitive and AUTUMN >=32 is resistant. This also predicts results for oral agents cefaclor, cefdinir, cefpodoxime, cefprozil, cefuroxime axetil, cephalexin and locarbef for uncomplicated UTI. Note that some isolates may be susceptible to these agents while testing resistant to cefazolin.      -  Cefepime: S <=2      -  Piperacillin/Tazobactam: S <=8      -  Ciprofloxacin: S <=0.25      -  Imipenem: S <=1      -  Ceftriaxone: S <=1      -  Ampicillin: R 16 These ampicillin results predict results for amoxicillin      Method Type: AUTUMN      -  Meropenem: S <=1      -  Ampicillin/Sulbactam: S <=4/2      -  Cefoxitin: S <=8      -  Cefuroxime: S <=4      -  Amoxicillin/Clavulanic Acid: S <=8/4      -  Trimethoprim/Sulfamethoxazole: R >2/38      -  Ertapenem: S <=0.5    Culture - Urine (collected 10 Aug 2023 10:40)  Source: Clean Catch Clean Catch (Midstream)  Final Report:    >100,000 CFU/ml Enterococcus faecium  Organism: Enterococcus faecium  Organism: Enterococcus faecium    Sensitivities:      -  Levofloxacin: R >4      -  Nitrofurantoin: S <=32 Should not be used to treat pyelonephritis.      -  Vancomycin: S 1      -  Ciprofloxacin: R >2      -  Ampicillin: R >8 Predicts results to ampicillin/sulbactam, amoxacillin-clavulanate and  piperacillin-tazobactam.      -  Tetracycline: R >8      Method Type: AUTUMN    RADIOLOGY:  imaging below personally reviewed    < from: US Renal (07.09.25 @ 10:14) >    IMPRESSION:  Decreased hydroureteronephrosis in the right renal transplant.     the patient had no significant events  he is for evaluation of his left arm lymphedema (had it for last 6 month, has left UE AVF due to Hx of ESRD before renal graft, denies left UE pain)     Follow Up:      Interval History/ROS:  Patient is a 74y old  Male who presents with a chief complaint of martita (06 Jul 2025 13:40)      REVIEW OF SYSTEMS  Constitutional: No fevers, chills, weight loss or fatigue   Skin: No rash, no phlebitis	  Eyes: No discharge	  ENMT: No sore throat, oral thrush, ulcers or exudate  Respiratory: No cough, no SOB  Cardiovascular:  No chest pain, palpitations or edema   Gastrointestinal: No pain, nausea, vomiting, diarrhea or constipation	  Genitourinary: No dysuria, discharge or flank pain  MSK: No arthralgias or back pain   Neurological: No HA, no weakness, no seizures, no AMS       Allergies  No Known Allergies        ANTIMICROBIALS:    atovaquone  Suspension 1500 daily  cefTRIAXone   IVPB 1000 every 24 hours      OTHER MEDS: MEDICATIONS  (STANDING):  aspirin  chewable 81 daily  atorvastatin 10 at bedtime  dextrose 50% Injectable 25 once  dextrose 50% Injectable 12.5 once  dextrose 50% Injectable 25 once  dextrose Oral Gel 15 once PRN  glucagon  Injectable 1 once  insulin lispro (ADMELOG) corrective regimen sliding scale  three times a day before meals  insulin lispro (ADMELOG) corrective regimen sliding scale  at bedtime  metoprolol succinate ER 50 daily  NIFEdipine XL 60 daily  ondansetron Injectable 4 once PRN  polyethylene glycol 3350 17 daily  predniSONE   Tablet 5 daily  senna 2 at bedtime  tamsulosin 0.4 at bedtime      Vital Signs Last 24 Hrs  T(F): 97.7 (07-10-25 @ 04:29), Max: 98.9 (07-05-25 @ 20:39)    Vital Signs Last 24 Hrs  HR: 69 (07-10-25 @ 04:29) (69 - 86)  BP: 145/76 (07-10-25 @ 04:29) (140/74 - 145/76)  RR: 18 (07-10-25 @ 04:29)  SpO2: 99% (07-10-25 @ 04:29) (99% - 99%)  Wt(kg): --    EXAM:  General: Patient in no acute distress   HEENT: NCAT, EOMI, PERRL, no oral lesions  CV: S1+S2, no m/r/g appreciated   Lungs: No respiratory distress, CTAB  Abd: Soft, mild tenderness in the right flank with deep palpation, (s/p PCN, nephrostomy bag with dark urine)   no guarding, no rebound tenderness, + bowel sounds   Velez with clear urine today   Ext: No cyanosis, no edema  Neuro: Alert and oriented, no focal deficits, CN II-XII grossly intact   Skin: No rash   IV: No phlebitis      Labs:                        10.4   5.70  )-----------( 179      ( 10 Jul 2025 07:04 )             33.3     07-10    136  |  96  |  49[H]  ----------------------------<  149[H]  4.3   |  23  |  5.72[H]    Ca    9.3      10 Jul 2025 07:04        WBC Trend:  WBC Count: 5.70 (07-10-25 @ 07:04)  WBC Count: 5.55 (07-09-25 @ 22:52)  WBC Count: 5.24 (07-09-25 @ 07:08)  WBC Count: 8.98 (07-08-25 @ 05:43)      Creatine Trend:  Creatinine: 5.72 (07-10)  Creatinine: 6.44 (07-09)  Creatinine: 5.94 (07-08)  Creatinine: 6.75 (07-07)      Liver Biochemical Testing Trend:  Alanine Aminotransferase (ALT/SGPT): 17 (07-01)  Alanine Aminotransferase (ALT/SGPT): 7 *L* (12-19)  Alanine Aminotransferase (ALT/SGPT): 9 *L* (12-18)  Alanine Aminotransferase (ALT/SGPT): 8 *L* (12-17)  Aspartate Aminotransferase (AST/SGOT): 32 (07-01-25 @ 16:43)  Aspartate Aminotransferase (AST/SGOT): 15 (12-19-24 @ 07:12)  Aspartate Aminotransferase (AST/SGOT): 20 (12-18-24 @ 05:54)  Aspartate Aminotransferase (AST/SGOT): 19 (12-17-24 @ 16:14)  Bilirubin Total: 0.3 (07-01)  Bilirubin Total: 0.3 (12-19)  Bilirubin Total: 0.3 (12-18)  Bilirubin Total: 0.2 (12-17)      Trend LDH      Urinalysis Basic - ( 10 Jul 2025 07:04 )    Urine Microscopic-Add On (NC) (07.08.25 @ 18:06)    White Blood Cell - Urine: 378 /HPF   Red Blood Cell - Urine: 239 /HPF   Bacteria: Too Numerous to count /HPF   Cast: 57 /LPF   Epithelial Cells: 1 /HPF   Yeast-like Cells: Present   Triple Phosphate Crystals: Present   Review: Reviewed        MICROBIOLOGY:    MRSA PCR Result.: Taranchristopher (07-08-25 @ 06:57)      Culture - Urine (collected 07 Jul 2025 13:23)  Source: Kidney Right Kidney  Final Report:    >100,000 CFU/ml Proteus mirabilis  Organism: Proteus mirabilis  Organism: Proteus mirabilis    Sensitivities:      -  Levofloxacin: S <=0.5      -  Tobramycin: S <=2      -  Aztreonam: S <=4      -  Gentamicin: S <=2      -  Cefepime: S <=2      -  Cefazolin: I 4 For uncomplicated UTI with K. pneumoniae, E. coli, or P. mirablis: AUTUMN <=16 is sensitive and AUTUMN >=32 is resistant. This also predicts results for oral agents cefaclor, cefdinir, cefpodoxime, cefprozil, cefuroxime axetil, cephalexin and locarbef for uncomplicated UTI. Note that some isolates may be susceptible to these agents while testing resistant to cefazolin.      -  Piperacillin/Tazobactam: S <=8      -  Ciprofloxacin: S <=0.25      -  Ceftriaxone: S <=1      -  Ampicillin: R >16 These ampicillin results predict results for amoxicillin      Method Type: AUTUMN      -  Meropenem: S <=1      -  Ampicillin/Sulbactam: S 8/4      -  Cefoxitin: S <=8      -  Cefuroxime: S <=4      -  Amoxicillin/Clavulanic Acid: S <=8/4      -  Trimethoprim/Sulfamethoxazole: R >2/38      -  Ertapenem: S <=0.5    Culture - Urine (collected 01 Jul 2025 21:18)  Source: Catheterized Catheterized  Final Report:    <10,000 CFU/mL Normal Urogenital Rae    Culture - Urine (collected 30 Jan 2025 20:14)  Source: Clean Catch Clean Catch (Midstream)  Final Report:    >100,000 CFU/ml Enterococcus faecium  Organism: Enterococcus faecium  Organism: Enterococcus faecium    Sensitivities:      -  Levofloxacin: R >4      -  Nitrofurantoin: S <=32 Should not be used to treat pyelonephritis.      -  Vancomycin: S 0.5      -  Ciprofloxacin: R >2      -  Ampicillin: R >8 Predicts results to ampicillin/sulbactam, amoxacillin-clavulanate and  piperacillin-tazobactam.      -  Tetracycline: R >8      Method Type: AUTUMN    Culture - Blood (collected 23 Dec 2024 09:17)  Source: .Blood BLOOD  Final Report:    No growth at 5 days    Culture - Urine (collected 18 Dec 2024 01:51)  Source: Clean Catch Clean Catch (Midstream)  Final Report:    >100,000 CFU/ml Klebsiella pneumoniae    Multiple Morphological Strains  Organism: Klebsiella pneumoniae  Klebsiella pneumoniae  Organism: Klebsiella pneumoniae    Sensitivities:      -  Levofloxacin: S <=0.5      -  Tobramycin: S <=2      -  Nitrofurantoin: I 64 Should not be used to treat pyelonephritis      -  Aztreonam: S <=4      -  Gentamicin: S <=2      -  Cefazolin: S <=2 For uncomplicated UTI with K. pneumoniae, E. coli, or P. mirablis: AUTUMN <=16 is sensitive and AUTUMN >=32 is resistant. This also predicts results for oral agents cefaclor, cefdinir, cefpodoxime, cefprozil, cefuroxime axetil, cephalexin and locarbef for uncomplicated UTI. Note that some isolates may be susceptible to these agents while testing resistant to cefazolin.      -  Cefepime: S <=2      -  Piperacillin/Tazobactam: S <=8      -  Ciprofloxacin: S <=0.25      -  Imipenem: S <=1      -  Ceftriaxone: S <=1      -  Ampicillin: R 16 These ampicillin results predict results for amoxicillin      Method Type: AUTUMN      -  Meropenem: S <=1      -  Ampicillin/Sulbactam: S <=4/2      -  Cefoxitin: S <=8      -  Cefuroxime: S <=4      -  Amoxicillin/Clavulanic Acid: S <=8/4      -  Trimethoprim/Sulfamethoxazole: R >2/38      -  Ertapenem: S <=0.5  Organism: Klebsiella pneumoniae    Sensitivities:      -  Levofloxacin: S <=0.5      -  Tobramycin: S <=2      -  Nitrofurantoin: S <=32 Should not be used to treat pyelonephritis      -  Aztreonam: S <=4      -  Gentamicin: S <=2      -  Cefazolin: S <=2 For uncomplicated UTI with K. pneumoniae, E. coli, or P. mirablis: AUTUMN <=16 is sensitive and AUTUMN >=32 is resistant. This also predicts results for oral agents cefaclor, cefdinir, cefpodoxime, cefprozil, cefuroxime axetil, cephalexin and locarbef for uncomplicated UTI. Note that some isolates may be susceptible to these agents while testing resistant to cefazolin.      -  Cefepime: S <=2      -  Piperacillin/Tazobactam: S <=8      -  Ciprofloxacin: S <=0.25      -  Imipenem: S <=1      -  Ceftriaxone: S <=1      -  Ampicillin: R 16 These ampicillin results predict results for amoxicillin      Method Type: AUTUMN      -  Meropenem: S <=1      -  Ampicillin/Sulbactam: S <=4/2      -  Cefoxitin: S <=8      -  Cefuroxime: S <=4      -  Amoxicillin/Clavulanic Acid: S <=8/4      -  Trimethoprim/Sulfamethoxazole: R >2/38      -  Ertapenem: S <=0.5    Culture - Urine (collected 10 Aug 2023 10:40)  Source: Clean Catch Clean Catch (Midstream)  Final Report:    >100,000 CFU/ml Enterococcus faecium  Organism: Enterococcus faecium  Organism: Enterococcus faecium    Sensitivities:      -  Levofloxacin: R >4      -  Nitrofurantoin: S <=32 Should not be used to treat pyelonephritis.      -  Vancomycin: S 1      -  Ciprofloxacin: R >2      -  Ampicillin: R >8 Predicts results to ampicillin/sulbactam, amoxacillin-clavulanate and  piperacillin-tazobactam.      -  Tetracycline: R >8      Method Type: AUTUMN    RADIOLOGY:  imaging below personally reviewed    < from: US Renal (07.09.25 @ 10:14) >    IMPRESSION:  Decreased hydroureteronephrosis in the right renal transplant.

## 2025-07-10 NOTE — DIETITIAN INITIAL EVALUATION ADULT - NSFNSNUTRHOMESUPPLEMENTFT_GEN_A_CORE
he has been consuming Ensure for the past 2 weeks, just because it was in the house and he ran out of diet coke

## 2025-07-10 NOTE — DIETITIAN INITIAL EVALUATION ADULT - PERTINENT MEDS FT
MEDICATIONS  (STANDING):  aspirin  chewable 81 milliGRAM(s) Oral daily  atorvastatin 10 milliGRAM(s) Oral at bedtime  atovaquone  Suspension 1500 milliGRAM(s) Oral daily  cefTRIAXone   IVPB 1000 milliGRAM(s) IV Intermittent every 24 hours  chlorhexidine 2% Cloths 1 Application(s) Topical daily  dextrose 5%. 1000 milliLiter(s) (100 mL/Hr) IV Continuous <Continuous>  dextrose 50% Injectable 25 Gram(s) IV Push once  dextrose 50% Injectable 12.5 Gram(s) IV Push once  dextrose 50% Injectable 25 Gram(s) IV Push once  glucagon  Injectable 1 milliGRAM(s) IntraMuscular once  insulin glargine Injectable (LANTUS) 16 Unit(s) SubCutaneous at bedtime  insulin lispro (ADMELOG) corrective regimen sliding scale   SubCutaneous three times a day before meals  insulin lispro (ADMELOG) corrective regimen sliding scale   SubCutaneous at bedtime  insulin lispro Injectable (ADMELOG) 4 Unit(s) SubCutaneous three times a day before meals  metoprolol succinate ER 50 milliGRAM(s) Oral daily  NIFEdipine XL 60 milliGRAM(s) Oral daily  petrolatum Ophthalmic Ointment 1 Application(s) Both EYES at bedtime  polyethylene glycol 3350 17 Gram(s) Oral daily  predniSONE   Tablet 5 milliGRAM(s) Oral daily  senna 2 Tablet(s) Oral at bedtime  sodium bicarbonate 650 milliGRAM(s) Oral every 8 hours  tamsulosin 0.4 milliGRAM(s) Oral at bedtime    MEDICATIONS  (PRN):  dextrose Oral Gel 15 Gram(s) Oral once PRN Blood Glucose LESS THAN 70 milliGRAM(s)/deciliter  ondansetron Injectable 4 milliGRAM(s) IV Push once PRN Nausea and/or Vomiting  sodium chloride 0.9% Bolus. 100 milliLiter(s) IV Bolus every 5 minutes PRN SBP LESS THAN or EQUAL to 90 mmHg

## 2025-07-10 NOTE — DIETITIAN INITIAL EVALUATION ADULT - NSICDXPASTSURGICALHX_GEN_ALL_CORE_FT
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 s/p Reversal    Kidney transplant recipient 2018  @ [facility] :  +  Hep C Donor    S/P anal fissurectomy and chemical denervation  with biopsy and fulguration of anal lesions, 2021    S/p nephrectomy left 9/15/2015   + Cancer    S/p nephrectomy right 12/10/2015   benign    S/P right knee arthroscopy     S/P total knee replacement, left     S/P TURP (transurethral resection of prostate) and Transplant Kidney Stent Replacemernt, 21

## 2025-07-10 NOTE — DIETITIAN INITIAL EVALUATION ADULT - NS FNS DIET ORDER
Diet, Consistent Carbohydrate Renal/No Snacks:   DASH/TLC {Sodium & Cholesterol Restricted} (DASH)  No Concentrated Potassium  Low Sodium  No Concentrated Phosphorus (07-02-25 @ 11:45) [Active]

## 2025-07-10 NOTE — PROGRESS NOTE ADULT - SUBJECTIVE AND OBJECTIVE BOX
Interventional Radiology Follow-Up Note    This is a 74y Male s/p transplant kidney nephrostomy tube placement on  in Interventional Radiology with Dr. Kim.     S:     Medication:  aspirin  chewable: ()  atovaquone  Suspension: ()  cefTRIAXone   IVPB: ()  metoprolol succinate ER: (07-10)  NIFEdipine XL: (07-10)    Vitals:   T(F): 97.7, Max: 98 (12:06)  HR: 69  BP: 145/76  RR: 18  SpO2: 99%    Physical Exam:  General: Nontoxic, in NAD, A&O x3.  Abdomen: soft, NTND, no peritoneal signs.  Drain Device: Drain intact attached to gravity bag/ALEXANDR with ___output. Dressing clean, dry, intact. Drain flushed w 5cc NS w/o difficulty. No pericatheter leakage noted, +fluid return noted in tubing.       24hr Drain output: 1050ml     Velez: 1450ml  Last HD  with 1L removed    LABS:                        10.4   5.70  )-----------( 179      ( 10 Jul 2025 07:04 )             33.3     07-10    136  |  96  |  49[H]  ----------------------------<  149[H]  4.3   |  23  |  5.72[H]    Ca    9.3      10 Jul 2025 07:04      Assessment/Plan:  75 yo M PMH ESRD s/p Kidney Transplant Recipient () c/b hydronephrosis in transplant kidney managed with ureteral stent exchanges (last exchange per chart review 2025 with Dr. 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 Patient is currently s/p transplant kidney nephrostomy tube placement on  in Interventional Radiology with Dr. Kim.     ***NOTE IN PROGRESS****    -regarding outpatient follow up with IR, Patient scheduled for stent retrieval with Dr. Kim on 8/15 at 10am.   -Hold asa x 5 days prior to procedure if able.   -NPO after midnight the night before procedure. ok to take morning meds with sip of water.     -continue global management per primary team  -H/H stable, continue to monitor h/h  -WBC 5.7, remains afebrile. Cx +proteus, remains on ceftriaxone  -Hemodynamically stable, continue to trend vs/labs  -flush drain with 5cc NS daily forward only; DO NOT aspirate  -change dressing q3 days or when dressing is saturated  -they will benefit from VNS service to help with drainage catheter care; they should continue same drainage catheter care as an outpatient.  -IR to follow  -d/w primary team    Any questions or concerns regarding above please reach out to IR:   -Available on microsoft teams  -During working hours (7a-5p): call [phone]  -Emergent issues after 5pm: page: [phone]  -Non-emergent consults: Please place a [place] order "IR Consult" with an appropriate callback number  -Scheduling questions: [phone]  -Clinic/Outpatient [phone]    Magalis Gutierrez DNP-[facility]  Interventional Radiology  Available on [employer] teams          Interventional Radiology Follow-Up Note    This is a 74y Male s/p transplant kidney nephrostomy tube placement on  in Interventional Radiology with Dr. Kim.     S:     Medication:  aspirin  chewable: ()  atovaquone  Suspension: ()  cefTRIAXone   IVPB: ()  metoprolol succinate ER: (07-10)  NIFEdipine XL: (07-10)    Vitals:   T(F): 97.7, Max: 98 (12:06)  HR: 69  BP: 145/76  RR: 18  SpO2: 99%    Physical Exam:  General: Nontoxic, in NAD, A&O x3.  Abdomen: soft, NTND, no peritoneal signs.  Drain Device: Drain intact attached to gravity bag/ALEXANDR with ___output. Dressing clean, dry, intact. Drain flushed w 5cc NS w/o difficulty. No pericatheter leakage noted, +fluid return noted in tubing.       24hr Drain output: 1050ml     Velez: 1450ml  Last HD  with 1L removed    LABS:                        10.4   5.70  )-----------( 179      ( 10 Jul 2025 07:04 )             33.3     07-10    136  |  96  |  49[H]  ----------------------------<  149[H]  4.3   |  23  |  5.72[H]    Ca    9.3      10 Jul 2025 07:04      Assessment/Plan:  73 yo M PMH ESRD s/p Kidney Transplant Recipient () c/b hydronephrosis in transplant kidney managed with ureteral stent exchanges (last exchange per chart review 2025 with Dr. 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 Patient is currently s/p transplant kidney nephrostomy tube placement on  in Interventional Radiology with Dr. Kim.     ***NOTE IN PROGRESS****    -IR requested for stent removal sooner given proteus growing in urine, stent is serving as nidus for recurrent infection and risk of keeping patient on prolonged abx. Per d/w transplant ID, once stent removed may be able to d/c abx. Bleeding risk discussed with ID and primary team given tract not mature and need to upsize for stent removal.   --Case d/w Dr. Kim, will tentatively plan for stent retrieval on  with Dr. Kim.   --Will need to hold asa x 5days with tentative plan to resume 24hrs post procedure if no signs/ concern for bleeding.   --NPO after midnight Wed    --STAT am labs  -H/H stable, continue to monitor h/h  -WBC 5.7, remains afebrile. Cx +proteus, remains on ceftriaxone  -Hemodynamically stable, continue to trend vs/labs  -flush drain with 5cc NS daily forward only; DO NOT aspirate  -change dressing q3 days or when dressing is saturated  -they will benefit from VNS service to help with drainage catheter care; they should continue same drainage catheter care as an outpatient.  -IR to follow  -d/w primary team  -continue global management per primary team    Any questions or concerns regarding above please reach out to IR:   -Available on micro[employer] teams  -During working hours (7a-5p): call [phone]  -Emergent issues after 5pm: page: [phone]  -Non-emergent consults: Please place a [place] order "IR Consult" with an appropriate callback number  -Scheduling questions: [phone]  -Clinic/Outpatient [phone]    Magalis uGtierrez DNP-[facility]  Interventional Radiology  Available on [employer] teams          Interventional Radiology Follow-Up Note    This is a 74y Male s/p transplant kidney nephrostomy tube placement on  in Interventional Radiology with Dr. Kim.     S: Patient seen and examined @ bedside. Denies any pain. Has questions regarding plan moving forward regarding stent removal    Medication:  aspirin  chewable: ()  atovaquone  Suspension: ()  cefTRIAXone   IVPB: ()  metoprolol succinate ER: (07-10)  NIFEdipine XL: (07-10)    Vitals:   T(F): 97.7, Max: 98 (12:06)  HR: 69  BP: 145/76  RR: 18  SpO2: 99%    Physical Exam:  General: Nontoxic, in NAD, A&O x3.  Abdomen: soft, NTND, no peritoneal signs.  Drain Device: Drain intact attached to gravity bag with clear yellow output. Dressing clean, dry, intact. Drain flushed w 5cc NS w/o difficulty. No pericatheter leakage noted, +fluid return noted in tubing.       24hr Drain output: 1050ml     Velez: 1450ml  Last HD  with 1L removed    LABS:                        10.4   5.70  )-----------( 179      ( 10 Jul 2025 07:04 )             33.3     07-10    136  |  96  |  49[H]  ----------------------------<  149[H]  4.3   |  23  |  5.72[H]    Ca    9.3      10 Jul 2025 07:04      Assessment/Plan:  73 yo M PMH ESRD s/p Kidney Transplant Recipient () c/b hydronephrosis in transplant kidney managed with ureteral stent exchanges (last exchange per chart review 2025 with Dr. 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 Patient is currently s/p transplant kidney nephrostomy tube placement on  in Interventional Radiology with Dr. Kim.     ***NOTE IN PROGRESS****    -IR requested for stent removal sooner given proteus growing in urine, stent is serving as nidus for recurrent infection and risk of keeping patient on prolonged abx. Per d/w transplant ID, once stent removed may be able to d/c abx. Bleeding risk discussed with ID and primary team given tract not mature and need to upsize for stent removal.   --Case d/w Dr. Kim, will tentatively plan for stent retrieval on  with Dr. Kim.   --Will need to hold asa x 5days with tentative plan to resume 24hrs post procedure if no signs/ concern for bleeding. PLAN FOR LAST DOSE SAT   --NPO after midnight Wed    --STAT am labs  -H/H stable, continue to monitor h/h  -WBC 5.7, remains afebrile. Cx +proteus, remains on ceftriaxone  -Hemodynamically stable, continue to trend vs/labs  -flush drain with 5cc NS daily forward only; DO NOT aspirate  -change dressing q3 days or when dressing is saturated  -they will benefit from VNS service to help with drainage catheter care; they should continue same drainage catheter care as an outpatient.  -IR to follow  -d/w primary team  -continue global management per primary team    Any questions or concerns regarding above please reach out to IR:   -Available on [employer] teams  -During working hours (7a-5p): call [phone]  -Emergent issues after 5pm: page: [phone]  -Non-emergent consults: Please place a [place] order "IR Consult" with an appropriate callback number  -Scheduling questions: [phone]  -Clinic/Outpatient [phone]    Magalis Gutierrez DNP-[facility]  Interventional Radiology  Available on [employer] teams          Interventional Radiology Follow-Up Note    This is a 74y Male s/p transplant kidney nephrostomy tube placement on  in Interventional Radiology with Dr. Kim.     S: Patient seen and examined @ bedside. Denies any pain. Has questions regarding plan moving forward regarding stent removal    Medication:  aspirin  chewable: ()  atovaquone  Suspension: ()  cefTRIAXone   IVPB: ()  metoprolol succinate ER: (07-10)  NIFEdipine XL: (07-10)    Vitals:   T(F): 97.7, Max: 98 (12:06)  HR: 69  BP: 145/76  RR: 18  SpO2: 99%    Physical Exam:  General: Nontoxic, in NAD, A&O x3.  Abdomen: soft, NTND, no peritoneal signs.  Drain Device: Drain intact attached to gravity bag with clear rand output. Dressing clean, dry, intact. Drain flushed w 5cc NS w/o difficulty. No pericatheter leakage noted, +fluid return noted in tubing.       24hr Drain output: 1050ml     Velez: 1450ml  Last HD  with 1L removed    LABS:                        10.4   5.70  )-----------( 179      ( 10 Jul 2025 07:04 )             33.3     07-10    136  |  96  |  49[H]  ----------------------------<  149[H]  4.3   |  23  |  5.72[H]    Ca    9.3      10 Jul 2025 07:04      Assessment/Plan:  75 yo M PMH ESRD s/p Kidney Transplant Recipient () c/b hydronephrosis in transplant kidney managed with ureteral stent exchanges (last exchange per chart review 2025 with Dr. 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 Patient is currently s/p transplant kidney nephrostomy tube placement on  in Interventional Radiology with Dr. Kim.     -IR requested for stent removal sooner given proteus growing in urine, stent is serving as nidus for recurrent infection and risk of keeping patient on prolonged abx. Per d/w transplant ID, once stent removed may be able to d/c abx. Bleeding risk discussed with ID and primary team given tract not mature and need to upsize for stent removal.   --Case d/w Dr. Kim, will tentatively plan for stent retrieval on  with Dr. Kim.   --Will need to hold asa x 5days with tentative plan to resume 24hrs post procedure if no signs/ concern for bleeding. PLAN FOR LAST DOSE SAT   --NPO after midnight Wed    --STAT am labs  -H/H stable, continue to monitor h/h  -WBC 5.7, remains afebrile. Cx +proteus, remains on ceftriaxone  -Hemodynamically stable, continue to trend vs/labs  -flush drain with 5cc NS daily forward only; DO NOT aspirate  -change dressing q3 days or when dressing is saturated  -they will benefit from VNS service to help with drainage catheter care; they should continue same drainage catheter care as an outpatient.  -IR to follow  -d/w primary team  -continue global management per primary team    Any questions or concerns regarding above please reach out to IR:   -Available on [employer] teams  -During working hours (7a-5p): call [phone]  -Emergent issues after 5pm: page: [phone]  -Non-emergent consults: Please place a [place] order "IR Consult" with an appropriate callback number  -Scheduling questions: [phone]  -Clinic/Outpatient [phone]    Magalis Gutierrez DNP-[facility]  Interventional Radiology  Available on [employer] teams

## 2025-07-10 NOTE — DIETITIAN INITIAL EVALUATION ADULT - PROBLEM SELECTOR PLAN 2
kidney transplantation Hep C+ DDRT in 7/2018 ([facility]). Transplant complicated by DGF, mild rejection treated with steroids, distal ureteral stricture requiring multiple procedures PCN, ureteral dilations, currently gets stent exchanges with Dr. Phillip.  - Continue immunosuppression: envarsus 6mg, MMF 500mg BID, prednisone 5mg  - Check tacrolimus level   - Seen by urology given mild hydronephrosis in transplanted kidney, recommend IR consult  - Transplant surgery consult in AM

## 2025-07-10 NOTE — DIETITIAN INITIAL EVALUATION ADULT - ORAL INTAKE PTA/DIET HISTORY
coffee, tea and toast for breakfast, vegetables, protein, rice for lunch and dinner. donuts for snacks. diet coke at times.

## 2025-07-11 LAB
ANION GAP SERPL CALC-SCNC: 16 MMOL/L — SIGNIFICANT CHANGE UP (ref 5–17)
BUN SERPL-MCNC: 56 MG/DL — HIGH (ref 7–23)
CALCIUM SERPL-MCNC: 9.3 MG/DL — SIGNIFICANT CHANGE UP (ref 8.4–10.5)
CHLORIDE SERPL-SCNC: 98 MMOL/L — SIGNIFICANT CHANGE UP (ref 96–108)
CO2 SERPL-SCNC: 20 MMOL/L — LOW (ref 22–31)
CREAT SERPL-MCNC: 5.08 MG/DL — HIGH (ref 0.5–1.3)
EGFR: 11 ML/MIN/1.73M2 — LOW
EGFR: 11 ML/MIN/1.73M2 — LOW
GLUCOSE BLDC GLUCOMTR-MCNC: 119 MG/DL — HIGH (ref 70–99)
GLUCOSE BLDC GLUCOMTR-MCNC: 127 MG/DL — HIGH (ref 70–99)
GLUCOSE BLDC GLUCOMTR-MCNC: 143 MG/DL — HIGH (ref 70–99)
GLUCOSE BLDC GLUCOMTR-MCNC: 238 MG/DL — HIGH (ref 70–99)
GLUCOSE SERPL-MCNC: 131 MG/DL — HIGH (ref 70–99)
HCT VFR BLD CALC: 35.4 % — LOW (ref 39–50)
HGB BLD-MCNC: 10 G/DL — LOW (ref 13–17)
MCHC RBC-ENTMCNC: 26.3 PG — LOW (ref 27–34)
MCHC RBC-ENTMCNC: 28.2 G/DL — LOW (ref 32–36)
MCV RBC AUTO: 93.2 FL — SIGNIFICANT CHANGE UP (ref 80–100)
NRBC # BLD AUTO: 0 K/UL — SIGNIFICANT CHANGE UP (ref 0–0)
NRBC # FLD: 0 K/UL — SIGNIFICANT CHANGE UP (ref 0–0)
NRBC BLD AUTO-RTO: 0 /100 WBCS — SIGNIFICANT CHANGE UP (ref 0–0)
PLATELET # BLD AUTO: 107 K/UL — LOW (ref 150–400)
PMV BLD: 11.6 FL — SIGNIFICANT CHANGE UP (ref 7–13)
POTASSIUM SERPL-MCNC: 4.7 MMOL/L — SIGNIFICANT CHANGE UP (ref 3.5–5.3)
POTASSIUM SERPL-SCNC: 4.7 MMOL/L — SIGNIFICANT CHANGE UP (ref 3.5–5.3)
RBC # BLD: 3.8 M/UL — LOW (ref 4.2–5.8)
RBC # FLD: 14.6 % — HIGH (ref 10.3–14.5)
SODIUM SERPL-SCNC: 134 MMOL/L — LOW (ref 135–145)
TACROLIMUS SERPL-MCNC: 2.9 NG/ML — SIGNIFICANT CHANGE UP
TACROLIMUS SERPL-MCNC: SIGNIFICANT CHANGE UP NG/ML
WBC # BLD: 4.84 K/UL — SIGNIFICANT CHANGE UP (ref 3.8–10.5)
WBC # FLD AUTO: 4.84 K/UL — SIGNIFICANT CHANGE UP (ref 3.8–10.5)

## 2025-07-11 PROCEDURE — 99232 SBSQ HOSP IP/OBS MODERATE 35: CPT | Mod: GC

## 2025-07-11 PROCEDURE — 99232 SBSQ HOSP IP/OBS MODERATE 35: CPT

## 2025-07-11 PROCEDURE — G0545: CPT

## 2025-07-11 PROCEDURE — 99233 SBSQ HOSP IP/OBS HIGH 50: CPT

## 2025-07-11 PROCEDURE — 99231 SBSQ HOSP IP/OBS SF/LOW 25: CPT

## 2025-07-11 RX ORDER — ACETAMINOPHEN 500 MG/5ML
650 LIQUID (ML) ORAL EVERY 6 HOURS
Refills: 0 | Status: DISCONTINUED | OUTPATIENT
Start: 2025-07-11 | End: 2025-07-18

## 2025-07-11 RX ADMIN — ATOVAQUONE 1500 MILLIGRAM(S): 750 SUSPENSION ORAL at 22:17

## 2025-07-11 RX ADMIN — POLYETHYLENE GLYCOL 3350 17 GRAM(S): 17 POWDER, FOR SOLUTION ORAL at 13:04

## 2025-07-11 RX ADMIN — Medication 650 MILLIGRAM(S): at 06:12

## 2025-07-11 RX ADMIN — Medication 650 MILLIGRAM(S): at 13:05

## 2025-07-11 RX ADMIN — Medication 81 MILLIGRAM(S): at 13:05

## 2025-07-11 RX ADMIN — ATORVASTATIN CALCIUM 10 MILLIGRAM(S): 80 TABLET, FILM COATED ORAL at 22:16

## 2025-07-11 RX ADMIN — TAMSULOSIN HYDROCHLORIDE 0.4 MILLIGRAM(S): 0.4 CAPSULE ORAL at 22:13

## 2025-07-11 RX ADMIN — METOPROLOL SUCCINATE 50 MILLIGRAM(S): 50 TABLET, EXTENDED RELEASE ORAL at 06:12

## 2025-07-11 RX ADMIN — CEFTRIAXONE 100 MILLIGRAM(S): 500 INJECTION, POWDER, FOR SOLUTION INTRAMUSCULAR; INTRAVENOUS at 19:00

## 2025-07-11 RX ADMIN — INSULIN GLARGINE-YFGN 16 UNIT(S): 100 INJECTION, SOLUTION SUBCUTANEOUS at 22:11

## 2025-07-11 RX ADMIN — INSULIN LISPRO 4 UNIT(S): 100 INJECTION, SOLUTION INTRAVENOUS; SUBCUTANEOUS at 17:30

## 2025-07-11 RX ADMIN — INSULIN LISPRO 2: 100 INJECTION, SOLUTION INTRAVENOUS; SUBCUTANEOUS at 13:03

## 2025-07-11 RX ADMIN — INSULIN LISPRO 4 UNIT(S): 100 INJECTION, SOLUTION INTRAVENOUS; SUBCUTANEOUS at 09:04

## 2025-07-11 RX ADMIN — Medication 650 MILLIGRAM(S): at 22:12

## 2025-07-11 RX ADMIN — PREDNISONE 5 MILLIGRAM(S): 20 TABLET ORAL at 06:12

## 2025-07-11 RX ADMIN — INSULIN LISPRO 4 UNIT(S): 100 INJECTION, SOLUTION INTRAVENOUS; SUBCUTANEOUS at 13:04

## 2025-07-11 RX ADMIN — Medication 2 TABLET(S): at 22:13

## 2025-07-11 RX ADMIN — Medication 60 MILLIGRAM(S): at 06:12

## 2025-07-11 NOTE — PROGRESS NOTE ADULT - ASSESSMENT
74 year old Male with PMHx of  ESRD on HD via L A/V fistula (~[id number]) s/p Kidney Transplant Recipient (2018) c/b hydronephrosis in transplant kidney managed with ureteral stent exchanges (last exchange per chart review 02/2025 with Dr. 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  PMHx of ESRD, kidney transplant recipient in 2018, Hx of hydronephrosis treated with stent, last exchange in 02/2025   Hx of HCV (treated with Epclusa), Prostate Ca s/p RT and TURP   HTN, T2DM    - right side PCN - placed on 7/7 due to hydronephrosis with some improvement as per renal US on 7/9   - UA on July 8 from nephrostomy tube: proteinuria,  (+ increased RBC)  (UA on July 1 was wnl, no pyuria)   Culture Results:   >100,000 CFU/ml Proteus mirabilis (07.07.25 @ 13:23)    - sensitivity is back and sensitive to cipro, levo and ceftriaxone     - discussed with urology team - plan for stent removal in on 7/17 to decrease the risk of infection reoccurance and antibiotic resistance which can be induced with prolong antibiotic treatment     - VS stable   - no leukocytosis   - Cr 5,08, GFR11 , AG 18 , carb CD 20 (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    - MRSA screening negative     Treatment: Ceftriaxone, will recommend to continue, pending ureteral stent retrieval     Prevention: Atovaquone - ? can be discontinued     Immunosuppression: on prednisone   Tacrolimus was held      # Failing renal graft , on HD and transplant nephrologist is following. AVF left UE - lymphedema for last 6 month - vascular is following  74 year old Male with PMHx of  ESRD on HD via L A/V fistula (~[id number]) s/p Kidney Transplant Recipient (2018) c/b hydronephrosis in transplant kidney managed with ureteral stent exchanges (last exchange per chart review 02/2025 with Dr. 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  PMHx of ESRD, kidney transplant recipient in 2018, Hx of hydronephrosis treated with stent, last exchange in 02/2025   Hx of HCV (treated with Epclusa), Prostate Ca s/p RT and TURP   HTN, T2DM    - right side PCN - placed on 7/7 due to hydronephrosis with some improvement as per renal US on 7/9   - UA on July 8 from nephrostomy tube: proteinuria,  (+ increased RBC)  (UA on July 1 was wnl, no pyuria)   Culture Results:   >100,000 CFU/ml Proteus mirabilis (07.07.25 @ 13:23)    - sensitivity is back and sensitive to cipro, levo and ceftriaxone     - discussed with urology team - plan for stent removal in on 7/17 to decrease the risk of infection reoccurrence and antibiotic resistance which can be induced with prolong antibiotic treatment   the stent should be removed as can be the cause of reinfection and pyelonephritis re-occurance     - VS stable   - no leukocytosis   - Cr 5,08, GFR11 , AG 18 , carb CD 20 (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    - MRSA screening negative     Treatment: Ceftriaxone, will recommend to continue, pending ureteral stent retrieval     Prevention: Atovaquone - ? can be discontinued     Immunosuppression: on prednisone   Tacrolimus was held      # Failing renal graft , on HD and transplant nephrologist is following. AVF left UE - lymphedema for last 6 month - vascular is following   - had few cessations of HD during the hospital stay - last 7/7 74 year old Male with PMHx of  ESRD on HD via L A/V fistula (~[id number]) s/p Kidney Transplant Recipient (2018) c/b hydronephrosis in transplant kidney managed with ureteral stent exchanges (last exchange per chart review 02/2025 with Dr. 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  PMHx of ESRD, kidney transplant recipient in 2018, Hx of hydronephrosis treated with stent, last exchange in 02/2025   Hx of HCV (treated with Epclusa), Prostate Ca s/p RT and TURP   HTN, T2DM    - right side PCN - placed on 7/7 due to hydronephrosis with some improvement as per renal US on 7/9   - UA on July 8 from nephrostomy tube: proteinuria,  (+ increased RBC)  (UA on July 1 was wnl, no pyuria)   Culture Results:   >100,000 CFU/ml Proteus mirabilis (07.07.25 @ 13:23)    - sensitivity is back and sensitive to cipro, levo and ceftriaxone     - discussed with urology team - plan for stent removal in on 7/17 to decrease the risk of infection reoccurrence and antibiotic resistance which can be induced with prolong antibiotic treatment   the stent should be removed as can be the cause of reinfection and pyelonephritis re-occurance     - VS stable   - no leukocytosis   - Cr 5,08, GFR11 , AG 18 , carb CD 20 (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    - MRSA screening negative     Treatment:  continue ceftriaxone for now, will be transitioned to orals at discharge. can use cefuroxime 500 mg daily   tentative plan to retrieve Stent on 7/17 with Dr Kim  so anticipate continuing ceftriaxone 1 g daily - if plan is to do the retrieval in the outpatient setting, can transition to cefuroxime once discharged and use cefuroxime through 7/18 or 24 hours post stent exchange, whichever comes later  Transplant ID will sign off    Prevention: Atovaquone - ? can be discontinued     Immunosuppression: on prednisone   Tacrolimus was held      # Failing renal graft , on HD and transplant nephrologist is following. AVF left UE - lymphedema for last 6 month - vascular is following   - had few cessations of HD during the hospital stay - last 7/7

## 2025-07-11 NOTE — PROGRESS NOTE ADULT - SUBJECTIVE AND OBJECTIVE BOX
Follow Up:      Interval History/ROS:Patient is a 74y old  Male who presents with a chief complaint of pyelonephritis (10 Jul 2025 14:34)      REVIEW OF SYSTEMS  Constitutional: No fevers, chills, weight loss or fatigue   Skin: No rash, no phlebitis	  Eyes: No discharge	  ENMT: No sore throat, oral thrush, ulcers or exudate  Respiratory: No cough, no SOB  Cardiovascular:  No chest pain, palpitations or edema   Gastrointestinal: No pain, nausea, vomiting, diarrhea or constipation	  Genitourinary: No dysuria, discharge or flank pain  MSK: No arthralgias or back pain   Neurological: No HA, no weakness, no seizures, no AMS       Allergies  No Known Allergies        ANTIMICROBIALS:    atovaquone  Suspension 1500 daily  cefTRIAXone   IVPB 1000 every 24 hours      OTHER MEDS: MEDICATIONS  (STANDING):  aspirin  chewable 81 daily  atorvastatin 10 at bedtime  dextrose 50% Injectable 25 once  dextrose 50% Injectable 12.5 once  dextrose 50% Injectable 25 once  dextrose Oral Gel 15 once PRN  glucagon  Injectable 1 once  insulin glargine Injectable (LANTUS) 16 at bedtime  insulin lispro (ADMELOG) corrective regimen sliding scale  three times a day before meals  insulin lispro (ADMELOG) corrective regimen sliding scale  at bedtime  insulin lispro Injectable (ADMELOG) 4 three times a day before meals  metoprolol succinate ER 50 daily  NIFEdipine XL 60 daily  ondansetron Injectable 4 once PRN  polyethylene glycol 3350 17 daily  predniSONE   Tablet 5 daily  senna 2 at bedtime  tamsulosin 0.4 at bedtime      Vital Signs Last 24 Hrs  T(F): 97.7 (07-11-25 @ 04:04), Max: 98.9 (07-05-25 @ 20:39)    Vital Signs Last 24 Hrs  HR: 59 (07-11-25 @ 04:04) (59 - 76)  BP: 162/87 (07-11-25 @ 04:04) (131/83 - 162/87)  RR: 18 (07-11-25 @ 04:04)  SpO2: 99% (07-11-25 @ 04:04) (99% - 99%)  Wt(kg): --    EXAM:  General: Patient in no acute distress   HEENT: NCAT, EOMI, PERRL, no oral lesions  CV: S1+S2, no m/r/g appreciated   Lungs: No respiratory distress, CTAB  Abd: Soft, nontender, no guarding, no rebound tenderness, + bowel sounds   Ext: No cyanosis, no edema  Neuro: Alert and oriented, no focal deficits, CN II-XII grossly intact   Skin: No rash   IV: No phlebitis      Labs:                        10.0   4.84  )-----------( 107      ( 11 Jul 2025 06:21 )             35.4     07-11    134[L]  |  98  |  56[H]  ----------------------------<  131[H]  4.7   |  20[L]  |  5.08[H]    Ca    9.3      11 Jul 2025 06:21        WBC Trend:  WBC Count: 4.84 (07-11-25 @ 06:21)  WBC Count: 5.70 (07-10-25 @ 07:04)  WBC Count: 5.55 (07-09-25 @ 22:52)  WBC Count: 5.24 (07-09-25 @ 07:08)      Creatine Trend:  Creatinine: 5.08 (07-11)  Creatinine: 5.72 (07-10)  Creatinine: 6.44 (07-09)  Creatinine: 5.94 (07-08)      Liver Biochemical Testing Trend:  Alanine Aminotransferase (ALT/SGPT): 17 (07-01)  Alanine Aminotransferase (ALT/SGPT): 7 *L* (12-19)  Alanine Aminotransferase (ALT/SGPT): 9 *L* (12-18)  Alanine Aminotransferase (ALT/SGPT): 8 *L* (12-17)  Aspartate Aminotransferase (AST/SGOT): 32 (07-01-25 @ 16:43)  Aspartate Aminotransferase (AST/SGOT): 15 (12-19-24 @ 07:12)  Aspartate Aminotransferase (AST/SGOT): 20 (12-18-24 @ 05:54)  Aspartate Aminotransferase (AST/SGOT): 19 (12-17-24 @ 16:14)  Bilirubin Total: 0.3 (07-01)  Bilirubin Total: 0.3 (12-19)  Bilirubin Total: 0.3 (12-18)  Bilirubin Total: 0.2 (12-17)      Trend LDH      Urinalysis Basic - ( 11 Jul 2025 06:21 )    Color: x / Appearance: x / SG: x / pH: x  Gluc: 131 mg/dL / Ketone: x  / Bili: x / Urobili: x   Blood: x / Protein: x / Nitrite: x   Leuk Esterase: x / RBC: x / WBC x   Sq Epi: x / Non Sq Epi: x / Bacteria: x        MICROBIOLOGY:    MRSA PCR Result.: Shilpi (07-08-25 @ 06:57)      Culture - Urine (collected 09 Jul 2025 07:11)  Source: Urine Nephrostomy - Right  Final Report:    <10,000 CFU/mL Normal Urogenital Rae    Culture - Urine (collected 07 Jul 2025 13:23)  Source: Kidney Right Kidney  Final Report:    >100,000 CFU/ml Proteus mirabilis  Organism: Proteus mirabilis  Organism: Proteus mirabilis    Sensitivities:      -  Levofloxacin: S <=0.5      -  Tobramycin: S <=2      -  Aztreonam: S <=4      -  Gentamicin: S <=2      -  Cefepime: S <=2      -  Cefazolin: I 4 For uncomplicated UTI with K. pneumoniae, E. coli, or P. mirablis: AUTUMN <=16 is sensitive and AUTUMN >=32 is resistant. This also predicts results for oral agents cefaclor, cefdinir, cefpodoxime, cefprozil, cefuroxime axetil, cephalexin and locarbef for uncomplicated UTI. Note that some isolates may be susceptible to these agents while testing resistant to cefazolin.      -  Piperacillin/Tazobactam: S <=8      -  Ciprofloxacin: S <=0.25      -  Ceftriaxone: S <=1      -  Ampicillin: R >16 These ampicillin results predict results for amoxicillin      Method Type: AUTUMN      -  Meropenem: S <=1      -  Ampicillin/Sulbactam: S 8/4      -  Cefoxitin: S <=8      -  Cefuroxime: S <=4      -  Amoxicillin/Clavulanic Acid: S <=8/4      -  Trimethoprim/Sulfamethoxazole: R >2/38      -  Ertapenem: S <=0.5    Culture - Urine (collected 01 Jul 2025 21:18)  Source: Catheterized Catheterized  Final Report:    <10,000 CFU/mL Normal Urogenital Rae    Culture - Urine (collected 30 Jan 2025 20:14)  Source: Clean Catch Clean Catch (Midstream)  Final Report:    >100,000 CFU/ml Enterococcus faecium  Organism: Enterococcus faecium  Organism: Enterococcus faecium    Sensitivities:      -  Levofloxacin: R >4      -  Nitrofurantoin: S <=32 Should not be used to treat pyelonephritis.      -  Vancomycin: S 0.5      -  Ciprofloxacin: R >2      -  Ampicillin: R >8 Predicts results to ampicillin/sulbactam, amoxacillin-clavulanate and  piperacillin-tazobactam.      -  Tetracycline: R >8      Method Type: AUTUMN    Culture - Blood (collected 23 Dec 2024 09:17)  Source: .Blood BLOOD  Final Report:    No growth at 5 days    Culture - Urine (collected 18 Dec 2024 01:51)  Source: Clean Catch Clean Catch (Midstream)  Final Report:    >100,000 CFU/ml Klebsiella pneumoniae    Multiple Morphological Strains  Organism: Klebsiella pneumoniae  Klebsiella pneumoniae  Organism: Klebsiella pneumoniae    Sensitivities:      -  Levofloxacin: S <=0.5      -  Tobramycin: S <=2      -  Nitrofurantoin: I 64 Should not be used to treat pyelonephritis      -  Aztreonam: S <=4      -  Gentamicin: S <=2      -  Cefazolin: S <=2 For uncomplicated UTI with K. pneumoniae, E. coli, or P. mirablis: AUTUMN <=16 is sensitive and AUTUMN >=32 is resistant. This also predicts results for oral agents cefaclor, cefdinir, cefpodoxime, cefprozil, cefuroxime axetil, cephalexin and locarbef for uncomplicated UTI. Note that some isolates may be susceptible to these agents while testing resistant to cefazolin.      -  Cefepime: S <=2      -  Piperacillin/Tazobactam: S <=8      -  Ciprofloxacin: S <=0.25      -  Imipenem: S <=1      -  Ceftriaxone: S <=1      -  Ampicillin: R 16 These ampicillin results predict results for amoxicillin      Method Type: AUTUMN      -  Meropenem: S <=1      -  Ampicillin/Sulbactam: S <=4/2      -  Cefoxitin: S <=8      -  Cefuroxime: S <=4      -  Amoxicillin/Clavulanic Acid: S <=8/4      -  Trimethoprim/Sulfamethoxazole: R >2/38      -  Ertapenem: S <=0.5  Organism: Klebsiella pneumoniae    Sensitivities:      -  Levofloxacin: S <=0.5      -  Tobramycin: S <=2      -  Nitrofurantoin: S <=32 Should not be used to treat pyelonephritis      -  Aztreonam: S <=4      -  Gentamicin: S <=2      -  Cefazolin: S <=2 For uncomplicated UTI with K. pneumoniae, E. coli, or P. mirablis: AUTUMN <=16 is sensitive and AUTUMN >=32 is resistant. This also predicts results for oral agents cefaclor, cefdinir, cefpodoxime, cefprozil, cefuroxime axetil, cephalexin and locarbef for uncomplicated UTI. Note that some isolates may be susceptible to these agents while testing resistant to cefazolin.      -  Cefepime: S <=2      -  Piperacillin/Tazobactam: S <=8      -  Ciprofloxacin: S <=0.25      -  Imipenem: S <=1      -  Ceftriaxone: S <=1      -  Ampicillin: R 16 These ampicillin results predict results for amoxicillin      Method Type: AUTUMN      -  Meropenem: S <=1      -  Ampicillin/Sulbactam: S <=4/2      -  Cefoxitin: S <=8      -  Cefuroxime: S <=4      -  Amoxicillin/Clavulanic Acid: S <=8/4      -  Trimethoprim/Sulfamethoxazole: R >2/38      -  Ertapenem: S <=0.5    Culture - Urine (collected 10 Aug 2023 10:40)  Source: Clean Catch Clean Catch (Midstream)  Final Report:    >100,000 CFU/ml Enterococcus faecium  Organism: Enterococcus faecium  Organism: Enterococcus faecium    Sensitivities:      -  Levofloxacin: R >4      -  Nitrofurantoin: S <=32 Should not be used to treat pyelonephritis.      -  Vancomycin: S 1      -  Ciprofloxacin: R >2      -  Ampicillin: R >8 Predicts results to ampicillin/sulbactam, amoxacillin-clavulanate and  piperacillin-tazobactam.      -  Tetracycline: R >8      Method Type: AUTUMN                                                RADIOLOGY:  imaging below personally reviewed   no new events    has armenta bag with some yellow urine and nephrostomy bag, with clear urine, no hematuria  no abdominal pain     plan for ureteral stent removal before discharge     Follow Up:      Interval History/ROS:  Patient is a 74y old  Male who presents with a chief complaint of pyelonephritis (10 Jul 2025 14:34)      REVIEW OF SYSTEMS  Constitutional: No fevers, chills, weight loss or fatigue   Skin: No rash, no phlebitis	  Eyes: No discharge	  ENMT: No sore throat, oral thrush, ulcers or exudate  Respiratory: No cough, no SOB  Cardiovascular:  No chest pain, palpitations or edema   Gastrointestinal: No pain, nausea, vomiting, diarrhea or constipation	  Genitourinary: No dysuria, discharge or flank pain  MSK: No arthralgias or back pain   Neurological: No HA, no weakness, no seizures, no AMS       Allergies  No Known Allergies        ANTIMICROBIALS:    atovaquone  Suspension 1500 daily  cefTRIAXone   IVPB 1000 every 24 hours      OTHER MEDS: MEDICATIONS  (STANDING):  aspirin  chewable 81 daily  atorvastatin 10 at bedtime  dextrose 50% Injectable 25 once  dextrose 50% Injectable 12.5 once  dextrose 50% Injectable 25 once  dextrose Oral Gel 15 once PRN  glucagon  Injectable 1 once  insulin glargine Injectable (LANTUS) 16 at bedtime  insulin lispro (ADMELOG) corrective regimen sliding scale  three times a day before meals  insulin lispro (ADMELOG) corrective regimen sliding scale  at bedtime  insulin lispro Injectable (ADMELOG) 4 three times a day before meals  metoprolol succinate ER 50 daily  NIFEdipine XL 60 daily  ondansetron Injectable 4 once PRN  polyethylene glycol 3350 17 daily  predniSONE   Tablet 5 daily  senna 2 at bedtime  tamsulosin 0.4 at bedtime      Vital Signs Last 24 Hrs  T(F): 97.7 (07-11-25 @ 04:04), Max: 98.9 (07-05-25 @ 20:39)    Vital Signs Last 24 Hrs  HR: 59 (07-11-25 @ 04:04) (59 - 76)  BP: 162/87 (07-11-25 @ 04:04) (131/83 - 162/87)  RR: 18 (07-11-25 @ 04:04)  SpO2: 99% (07-11-25 @ 04:04) (99% - 99%)  Wt(kg): --    EXAM:  General: Patient in no acute distress   HEENT: NCAT, EOMI, PERRL, no oral lesions  CV: S1+S2, no m/r/g appreciated   Lungs: No respiratory distress, CTAB  Abd: Soft, nontender, no guarding, no rebound tenderness, + bowel sounds   Ext: No cyanosis, no edema  Neuro: Alert and oriented, no focal deficits, CN II-XII grossly intact   Skin: No rash   IV: No phlebitis      Labs:                        10.0   4.84  )-----------( 107      ( 11 Jul 2025 06:21 )             35.4     07-11    134[L]  |  98  |  56[H]  ----------------------------<  131[H]  4.7   |  20[L]  |  5.08[H]    Ca    9.3      11 Jul 2025 06:21        WBC Trend:  WBC Count: 4.84 (07-11-25 @ 06:21)  WBC Count: 5.70 (07-10-25 @ 07:04)  WBC Count: 5.55 (07-09-25 @ 22:52)  WBC Count: 5.24 (07-09-25 @ 07:08)      Creatine Trend:  Creatinine: 5.08 (07-11)  Creatinine: 5.72 (07-10)  Creatinine: 6.44 (07-09)  Creatinine: 5.94 (07-08)      Liver Biochemical Testing Trend:  Alanine Aminotransferase (ALT/SGPT): 17 (07-01)  Alanine Aminotransferase (ALT/SGPT): 7 *L* (12-19)  Alanine Aminotransferase (ALT/SGPT): 9 *L* (12-18)  Alanine Aminotransferase (ALT/SGPT): 8 *L* (12-17)  Aspartate Aminotransferase (AST/SGOT): 32 (07-01-25 @ 16:43)  Aspartate Aminotransferase (AST/SGOT): 15 (12-19-24 @ 07:12)  Aspartate Aminotransferase (AST/SGOT): 20 (12-18-24 @ 05:54)  Aspartate Aminotransferase (AST/SGOT): 19 (12-17-24 @ 16:14)  Bilirubin Total: 0.3 (07-01)  Bilirubin Total: 0.3 (12-19)  Bilirubin Total: 0.3 (12-18)  Bilirubin Total: 0.2 (12-17)      Trend LDH      Urinalysis Basic - ( 11 Jul 2025 06:21 )    Color: x / Appearance: x / SG: x / pH: x  Gluc: 131 mg/dL / Ketone: x  / Bili: x / Urobili: x   Blood: x / Protein: x / Nitrite: x   Leuk Esterase: x / RBC: x / WBC x   Sq Epi: x / Non Sq Epi: x / Bacteria: x        MICROBIOLOGY:    MRSA PCR Result.: Shilpi (07-08-25 @ 06:57)      Culture - Urine (collected 09 Jul 2025 07:11)  Source: Urine Nephrostomy - Right  Final Report:    <10,000 CFU/mL Normal Urogenital Rae    Culture - Urine (collected 07 Jul 2025 13:23)  Source: Kidney Right Kidney  Final Report:    >100,000 CFU/ml Proteus mirabilis  Organism: Proteus mirabilis  Organism: Proteus mirabilis    Sensitivities:      -  Levofloxacin: S <=0.5      -  Tobramycin: S <=2      -  Aztreonam: S <=4      -  Gentamicin: S <=2      -  Cefepime: S <=2      -  Cefazolin: I 4 For uncomplicated UTI with K. pneumoniae, E. coli, or P. mirablis: AUTUMN <=16 is sensitive and AUTUMN >=32 is resistant. This also predicts results for oral agents cefaclor, cefdinir, cefpodoxime, cefprozil, cefuroxime axetil, cephalexin and locarbef for uncomplicated UTI. Note that some isolates may be susceptible to these agents while testing resistant to cefazolin.      -  Piperacillin/Tazobactam: S <=8      -  Ciprofloxacin: S <=0.25      -  Ceftriaxone: S <=1      -  Ampicillin: R >16 These ampicillin results predict results for amoxicillin      Method Type: AUTUMN      -  Meropenem: S <=1      -  Ampicillin/Sulbactam: S 8/4      -  Cefoxitin: S <=8      -  Cefuroxime: S <=4      -  Amoxicillin/Clavulanic Acid: S <=8/4      -  Trimethoprim/Sulfamethoxazole: R >2/38      -  Ertapenem: S <=0.5    Culture - Urine (collected 01 Jul 2025 21:18)  Source: Catheterized Catheterized  Final Report:    <10,000 CFU/mL Normal Urogenital Rae    Culture - Urine (collected 30 Jan 2025 20:14)  Source: Clean Catch Clean Catch (Midstream)  Final Report:    >100,000 CFU/ml Enterococcus faecium  Organism: Enterococcus faecium  Organism: Enterococcus faecium    Sensitivities:      -  Levofloxacin: R >4      -  Nitrofurantoin: S <=32 Should not be used to treat pyelonephritis.      -  Vancomycin: S 0.5      -  Ciprofloxacin: R >2      -  Ampicillin: R >8 Predicts results to ampicillin/sulbactam, amoxacillin-clavulanate and  piperacillin-tazobactam.      -  Tetracycline: R >8      Method Type: AUTUMN    Culture - Blood (collected 23 Dec 2024 09:17)  Source: .Blood BLOOD  Final Report:    No growth at 5 days    Culture - Urine (collected 18 Dec 2024 01:51)  Source: Clean Catch Clean Catch (Midstream)  Final Report:    >100,000 CFU/ml Klebsiella pneumoniae    Multiple Morphological Strains  Organism: Klebsiella pneumoniae  Klebsiella pneumoniae  Organism: Klebsiella pneumoniae    Sensitivities:      -  Levofloxacin: S <=0.5      -  Tobramycin: S <=2      -  Nitrofurantoin: I 64 Should not be used to treat pyelonephritis      -  Aztreonam: S <=4      -  Gentamicin: S <=2      -  Cefazolin: S <=2 For uncomplicated UTI with K. pneumoniae, E. coli, or P. mirablis: AUTUMN <=16 is sensitive and AUTUMN >=32 is resistant. This also predicts results for oral agents cefaclor, cefdinir, cefpodoxime, cefprozil, cefuroxime axetil, cephalexin and locarbef for uncomplicated UTI. Note that some isolates may be susceptible to these agents while testing resistant to cefazolin.      -  Cefepime: S <=2      -  Piperacillin/Tazobactam: S <=8      -  Ciprofloxacin: S <=0.25      -  Imipenem: S <=1      -  Ceftriaxone: S <=1      -  Ampicillin: R 16 These ampicillin results predict results for amoxicillin      Method Type: AUTUMN      -  Meropenem: S <=1      -  Ampicillin/Sulbactam: S <=4/2      -  Cefoxitin: S <=8      -  Cefuroxime: S <=4      -  Amoxicillin/Clavulanic Acid: S <=8/4      -  Trimethoprim/Sulfamethoxazole: R >2/38      -  Ertapenem: S <=0.5  Organism: Klebsiella pneumoniae    Sensitivities:      -  Levofloxacin: S <=0.5      -  Tobramycin: S <=2      -  Nitrofurantoin: S <=32 Should not be used to treat pyelonephritis      -  Aztreonam: S <=4      -  Gentamicin: S <=2      -  Cefazolin: S <=2 For uncomplicated UTI with K. pneumoniae, E. coli, or P. mirablis: AUTUMN <=16 is sensitive and AUTUMN >=32 is resistant. This also predicts results for oral agents cefaclor, cefdinir, cefpodoxime, cefprozil, cefuroxime axetil, cephalexin and locarbef for uncomplicated UTI. Note that some isolates may be susceptible to these agents while testing resistant to cefazolin.      -  Cefepime: S <=2      -  Piperacillin/Tazobactam: S <=8      -  Ciprofloxacin: S <=0.25      -  Imipenem: S <=1      -  Ceftriaxone: S <=1      -  Ampicillin: R 16 These ampicillin results predict results for amoxicillin      Method Type: AUTUMN      -  Meropenem: S <=1      -  Ampicillin/Sulbactam: S <=4/2      -  Cefoxitin: S <=8      -  Cefuroxime: S <=4      -  Amoxicillin/Clavulanic Acid: S <=8/4      -  Trimethoprim/Sulfamethoxazole: R >2/38      -  Ertapenem: S <=0.5    Culture - Urine (collected 10 Aug 2023 10:40)  Source: Clean Catch Clean Catch (Midstream)  Final Report:    >100,000 CFU/ml Enterococcus faecium  Organism: Enterococcus faecium  Organism: Enterococcus faecium    Sensitivities:      -  Levofloxacin: R >4      -  Nitrofurantoin: S <=32 Should not be used to treat pyelonephritis.      -  Vancomycin: S 1      -  Ciprofloxacin: R >2      -  Ampicillin: R >8 Predicts results to ampicillin/sulbactam, amoxacillin-clavulanate and  piperacillin-tazobactam.      -  Tetracycline: R >8      Method Type: AUTUMN                                                RADIOLOGY:  imaging below personally reviewed

## 2025-07-11 NOTE — PROGRESS NOTE ADULT - ASSESSMENT
75 yo M h/o HTN, T2DM, Hep C, ESRD and kidney, renal cancer, prostate cancer, kidney transplant recipient  in 2018  Sent in by nephrologist Dr. Bhakta for rise in Scr. Transplant nephrology consulted for CHELA on CKD in DDRT patient    1. s/p Hep C+ DDRT in 7/2018 ([facility]) currently admitted for CHELA on CKD  Transplant complicated by DGF, mild rejection treated with steroids, distal ureteral stricture requiring multiple procedures PCN, ureteral dilations, currently gets stent exchanges with Dr. Phillip.  Pts aaron creatinine 1.9 but had CHELA with pyelonephritis. Has had multiple episodes of CHELA. Last creatinine 3.97 in setting of likely UTI and moderate hydronephrosis on sono (4/2025). He was seen by urology and placed on flomax. Pt also has internal JJ stent being exchanged by Dr. Phillip in January 2025. Scr on admission is elevated at 5.57 on 7/1. SCr currently 5.0 s/p armenta placement and PCN placement.   UA with proteins, large Blood with RBC, and positive LE. UPCR 0.9  US Transplanted Kidney: Mild hydronephrosis with ureteral thickening, grossly unchanged from prior. Ureteral stent reidentified.  - s/p armenta catheter placement. UOP is 650cc in 24 hours.   - s/p IR placement of PCN 7/7/25. Monitor output, increasing to 1L/24 hrs.   - Plan for stent removal with IR on 7/17 and abx till stent removed.   - No further plans for iHD at this time. Will assess for HD daily.   - Monitor labs and I/Os. Avoid nephrotoxins including, ACE/ARB, NSAIDs, contrast, etc. Dose medications as per eGFR.     2. Immunosuppression:   Simulect induction, ENV 6mg daily, target 5-7, MMF 250mg BID and pred 5.   Dose tacro per level. continue to monitor tacro levels daily - 30 mins before the morning dose.  - Current level was supratherapeutic at 14. Repeat level pending. Will dose pending level.   - c/w MMF and pred   PPX: cont mepron.     3. DM2 - on glimepiride and Rybelsus - controlled. ISS and monitor BGM  4. HTN - Cont home regimen  5. Oxalaturia - continue calcium carbonate 600mgs BID with meals, and continue sodium citrate 15 ml BID.    If you have any questions, please feel free to contact me:  Kathrine Luo MD PGY-5  Nephrology Chief Fellow  [employer] Teams (Preferred)/ Pager [phone]   (After 5pm or on weekends please page the on-call fellow)

## 2025-07-11 NOTE — PROGRESS NOTE ADULT - SUBJECTIVE AND OBJECTIVE BOX
Interventional Radiology Follow-Up Note    This is a 74y Male s/p ___________ on _________ in Interventional Radiology with  _________.     S: Patient seen and examined @ bedside. No complaints offered.     Medication:     aspirin  chewable: (07-09)  atovaquone  Suspension: (07-10)  cefTRIAXone   IVPB: (07-10)  metoprolol succinate ER: (07-11)  NIFEdipine XL: (07-11)    Vitals:   T(F): 97.7, Max: 98.6 (11:34)  HR: 59  BP: 162/87  RR: 18  SpO2: 99%    Physical Exam:  General: Nontoxic, in NAD, A&O x3.  Abdomen: soft, NTND, no peritoneal signs.  Extremities:  ____ groin clean, dry and intact, soft with no evidence of hematoma, ___femoral/dp/pt pulses +___. No pedal edema or calf tenderness noted.  Drain Device: Drain intact attached to ____. Dressing clean, dry, intact.    24hr Drain output: ____    LABS:  WBC 4.84 / Hgb 10.0 / Hct 35.4 / Plt 107  Na 134 / K 4.7 / CO2 20 / Cl 98 / BUN 56 / Cr 5.08 / Glucose 131  ALT -- / AST -- / Alk Phos -- / Tbili --  Ptt -- / Pt -- / INR --      Assessment/Plan:    73 yo M PMH ESRD s/p Kidney Transplant Recipient (2018) c/b hydronephrosis in transplant kidney managed with ureteral stent exchanges (last exchange per chart review 02/2025 with Dr. 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 Patient is currently s/p transplant kidney nephrostomy tube placement on 7/7 in Interventional Radiology with Dr. Kim.     -IR requested for stent removal sooner given proteus growing in urine, stent is serving as nidus for recurrent infection and risk of keeping patient on prolonged abx. Per d/w transplant ID, once stent removed may be able to d/c abx. Bleeding risk discussed with ID and primary team given tract not mature and need to upsize for stent removal.   --Case d/w Dr. Kim, will tentatively plan for stent retrieval on 7/17 with Dr. Kim.   --Will need to hold asa x 5days with tentative plan to resume 24hrs post procedure if no signs/ concern for bleeding. PLAN FOR LAST DOSE SAT 7/12  --NPO after midnight Wed 7/16   --STAT am labs  -H/H stable, continue to monitor h/h  -WBC 5.7, remains afebrile. Cx +proteus, remains on ceftriaxone  -Hemodynamically stable, continue to trend vs/labs  -flush drain with 5cc NS daily forward only; DO NOT aspirate  -change dressing q3 days or when dressing is saturated  -they will benefit from VNS service to help with drainage catheter care; they should continue same drainage catheter care as an outpatient.  -IR to follow  -d/w primary team  -continue global management per primary team     Please call IR at extension [phone] with any questions, concerns, or issues regarding above.       Interventional Radiology Follow-Up Note    This is a 74y Male s/p right transplant PCN on 7/7 in Interventional Radiology with Dr. Kim.     S: Patient seen and examined @ bedside. No complaints offered.     Medication:  aspirin  chewable: (07-09)  atovaquone  Suspension: (07-10)  cefTRIAXone   IVPB: (07-10)  metoprolol succinate ER: (07-11)  NIFEdipine XL: (07-11)    Vitals:   T(F): 97.7, Max: 98.6 (11:34)  HR: 59  BP: 162/87  RR: 18  SpO2: 99%    Physical Exam:  General: Nontoxic, in NAD, A&O x3.  Abdomen: soft, NTND, no peritoneal signs.  Drain Device: Drain intact attached to gravity bag. Dressing clean, dry, intact. Draining clear yellow urine.     24hr Drain output: 1000ml     LABS:  WBC 4.84 / Hgb 10.0 / Hct 35.4 / Plt 107  Na 134 / K 4.7 / CO2 20 / Cl 98 / BUN 56 / Cr 5.08 / Glucose 131  ALT -- / AST -- / Alk Phos -- / Tbili --  Ptt -- / Pt -- / INR --      Assessment/Plan:    75 yo M PMH ESRD s/p Kidney Transplant Recipient (2018) c/b hydronephrosis in transplant kidney managed with ureteral stent exchanges (last exchange per chart review 02/2025 with Dr. 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 Patient is currently s/p transplant kidney nephrostomy tube placement on 7/7 in Interventional Radiology with Dr. Kim.     -IR requested for stent removal sooner given proteus growing in urine, stent is serving as nidus for recurrent infection and risk of keeping patient on prolonged abx. Per d/w transplant ID, once stent removed may be able to d/c abx. Bleeding risk discussed with ID and primary team given tract not mature and need to upsize for stent removal.   --Case d/w Dr. Kim, will tentatively plan for stent retrieval on 7/17 with Dr. Kim.   --Will need to hold asa x 5days with tentative plan to resume 24hrs post procedure if no signs/ concern for bleeding. PLAN FOR LAST DOSE SAT 7/12  --NPO after midnight Wed 7/16   --STAT am labs  -H/H stable, continue to monitor h/h  -WBC WNL, remains afebrile. Cx +proteus, remains on ceftriaxone  -Hemodynamically stable, continue to trend vs/labs  -flush drain with 5cc NS daily forward only; DO NOT aspirate  -change dressing q3 days or when dressing is saturated  -they will benefit from VNS service to help with drainage catheter care; they should continue same drainage catheter care as an outpatient.  -IR to follow  -d/w primary team  -continue global management per primary team     Please call IR at extension [phone] with any questions, concerns, or issues regarding above.

## 2025-07-11 NOTE — PROGRESS NOTE ADULT - PROBLEM SELECTOR PLAN 1
- CHELA on CKD 2/2 malpositioned ureteral stent  - S/p RNT placement 7/7   - UA+, UCx + proteus   - C/w IV ceftriaxone    - Bactrim changed to mepron for ppx  - Sodium bicarb TID  - Monitor urine output, I/Os, Cr  - serial bladder scans for PVR  - Has been receiving HD, renal follow up to asses need for HD daily

## 2025-07-11 NOTE — PROGRESS NOTE ADULT - SUBJECTIVE AND OBJECTIVE BOX
[facility] DIVISION OF KIDNEY DISEASES AND HYPERTENSION -- FOLLOW UP NOTE  --------------------------------------------------------------------------------  Chief Complaint: s/p DDRT, CHELA on CKD    24 hour events/subjective: Pt. seen and examined at bedside earlier today. PCN in place with 1L output/24 hours. Has armenta cathter in place as well. No fever, CP, SOB, LE edema, HA or dizziness during rounds today.         PAST HISTORY  --------------------------------------------------------------------------------  No significant changes to PMH, PSH, FHx, SHx, unless otherwise noted    ALLERGIES & MEDICATIONS  --------------------------------------------------------------------------------  Allergies    No Known Allergies    Intolerances      Standing Inpatient Medications  aspirin  chewable 81 milliGRAM(s) Oral daily  atorvastatin 10 milliGRAM(s) Oral at bedtime  atovaquone  Suspension 1500 milliGRAM(s) Oral daily  cefTRIAXone   IVPB 1000 milliGRAM(s) IV Intermittent every 24 hours  chlorhexidine 2% Cloths 1 Application(s) Topical daily  dextrose 5%. 1000 milliLiter(s) IV Continuous <Continuous>  dextrose 50% Injectable 25 Gram(s) IV Push once  dextrose 50% Injectable 12.5 Gram(s) IV Push once  dextrose 50% Injectable 25 Gram(s) IV Push once  glucagon  Injectable 1 milliGRAM(s) IntraMuscular once  insulin glargine Injectable (LANTUS) 16 Unit(s) SubCutaneous at bedtime  insulin lispro (ADMELOG) corrective regimen sliding scale   SubCutaneous three times a day before meals  insulin lispro (ADMELOG) corrective regimen sliding scale   SubCutaneous at bedtime  insulin lispro Injectable (ADMELOG) 4 Unit(s) SubCutaneous three times a day before meals  metoprolol succinate ER 50 milliGRAM(s) Oral daily  NIFEdipine XL 60 milliGRAM(s) Oral daily  petrolatum Ophthalmic Ointment 1 Application(s) Both EYES at bedtime  polyethylene glycol 3350 17 Gram(s) Oral daily  predniSONE   Tablet 5 milliGRAM(s) Oral daily  senna 2 Tablet(s) Oral at bedtime  sodium bicarbonate 650 milliGRAM(s) Oral every 8 hours  tamsulosin 0.4 milliGRAM(s) Oral at bedtime    PRN Inpatient Medications  dextrose Oral Gel 15 Gram(s) Oral once PRN  ondansetron Injectable 4 milliGRAM(s) IV Push once PRN  sodium chloride 0.9% Bolus. 100 milliLiter(s) IV Bolus every 5 minutes PRN      REVIEW OF SYSTEMS  --------------------------------------------------------------------------------  Gen: No fevers/chills  Respiratory: No dyspnea, cough,   CV: No chest pain, PND, orthopnea  GI: No abdominal pain, diarrhea, constipation, nausea, vomiting  Transplant: No pain  : +PCN +armenta  MSK: +LUE edema.  Neuro: No dizziness/lightheadedness  Access: LUE AVF    All other systems were reviewed and are negative, except as noted.    VITALS/PHYSICAL EXAM  --------------------------------------------------------------------------------  T(C): 36.5 (07-11-25 @ 04:04), Max: 37 (07-10-25 @ 11:34)  HR: 59 (07-11-25 @ 04:04) (59 - 76)  BP: 162/87 (07-11-25 @ 04:04) (131/83 - 162/87)  RR: 18 (07-11-25 @ 04:04) (18 - 18)  SpO2: 99% (07-11-25 @ 04:04) (99% - 99%)  Wt(kg): --        07-10-25 @ 07:01  -  07-11-25 @ 07:00  --------------------------------------------------------  IN: 140 mL / OUT: 1650 mL / NET: -1510 mL      Physical Exam:  	Gen: NAD, able to speak in full sentences   	HEENT: PERRL, MMM   	Pulm: CTA B/L, no crackles   	CV: RRR, S1S2+  	Abd: +BS, soft          Transplant: No tenderness, swelling  	: +armenta, +R PCN  	MSK: no edema   	Psych: Normal affect and mood  	Skin: Warm          Access: LUE AVF with edema +thrill     LABS/STUDIES  --------------------------------------------------------------------------------              10.0   4.84  >-----------<  107      [07-11-25 @ 06:21]              35.4     134  |  98  |  56  ----------------------------<  131      [07-11-25 @ 06:21]  4.7   |  20  |  5.08        Ca     9.3     [07-11-25 @ 06:21]            Creatinine Trend:  SCr 5.08 [07-11 @ 06:21]  SCr 5.72 [07-10 @ 07:04]  SCr 6.44 [07-09 @ 07:08]  SCr 5.94 [07-08 @ 05:43]  SCr 6.75 [07-07 @ 06:39]    Tacrolimus (), Serum: QNS ng/mL (07-11 @ 06:21)  Tacrolimus (), Serum: 4.7 ng/mL (07-10 @ 07:04)  Tacrolimus (), Serum: 9.2 ng/mL (07-09 @ 07:08)  Tacrolimus (), Serum: 19.5 ng/mL (07-08 @ 05:43)            Urinalysis - [07-11-25 @ 06:21]      Color  / Appearance  / SG  / pH       Gluc 131 / Ketone   / Bili  / Urobili        Blood  / Protein  / Leuk Est  / Nitrite       RBC  / WBC  / Hyaline  / Gran  / Sq Epi  / Non Sq Epi  / Bacteria         HBsAg Nonreact      [07-03-25 @ 17:35]        IMAGING/RADIOLOGY: Reviewed.

## 2025-07-11 NOTE — PROGRESS NOTE ADULT - SUBJECTIVE AND OBJECTIVE BOX
Patient is a 74y old  Male who presents with a chief complaint of pyelonephritis (10 Jul 2025 14:34)      SUBJECTIVE / OVERNIGHT EVENTS: pt feels better, denies cp, sob, abd pain, had bm, soreness around R NT site is improved     MEDICATIONS  (STANDING):  aspirin  chewable 81 milliGRAM(s) Oral daily  atorvastatin 10 milliGRAM(s) Oral at bedtime  atovaquone  Suspension 1500 milliGRAM(s) Oral daily  cefTRIAXone   IVPB 1000 milliGRAM(s) IV Intermittent every 24 hours  chlorhexidine 2% Cloths 1 Application(s) Topical daily  dextrose 5%. 1000 milliLiter(s) (100 mL/Hr) IV Continuous <Continuous>  dextrose 50% Injectable 25 Gram(s) IV Push once  dextrose 50% Injectable 12.5 Gram(s) IV Push once  dextrose 50% Injectable 25 Gram(s) IV Push once  glucagon  Injectable 1 milliGRAM(s) IntraMuscular once  insulin glargine Injectable (LANTUS) 16 Unit(s) SubCutaneous at bedtime  insulin lispro (ADMELOG) corrective regimen sliding scale   SubCutaneous three times a day before meals  insulin lispro (ADMELOG) corrective regimen sliding scale   SubCutaneous at bedtime  insulin lispro Injectable (ADMELOG) 4 Unit(s) SubCutaneous three times a day before meals  metoprolol succinate ER 50 milliGRAM(s) Oral daily  NIFEdipine XL 60 milliGRAM(s) Oral daily  petrolatum Ophthalmic Ointment 1 Application(s) Both EYES at bedtime  polyethylene glycol 3350 17 Gram(s) Oral daily  predniSONE   Tablet 5 milliGRAM(s) Oral daily  senna 2 Tablet(s) Oral at bedtime  sodium bicarbonate 650 milliGRAM(s) Oral every 8 hours  tamsulosin 0.4 milliGRAM(s) Oral at bedtime    MEDICATIONS  (PRN):  acetaminophen     Tablet .. 650 milliGRAM(s) Oral every 6 hours PRN Mild Pain (1 - 3)  dextrose Oral Gel 15 Gram(s) Oral once PRN Blood Glucose LESS THAN 70 milliGRAM(s)/deciliter  ondansetron Injectable 4 milliGRAM(s) IV Push once PRN Nausea and/or Vomiting  sodium chloride 0.9% Bolus. 100 milliLiter(s) IV Bolus every 5 minutes PRN SBP LESS THAN or EQUAL to 90 mmHg        CAPILLARY BLOOD GLUCOSE      POCT Blood Glucose.: 143 mg/dL (11 Jul 2025 08:48)  POCT Blood Glucose.: 148 mg/dL (10 Jul 2025 22:10)  POCT Blood Glucose.: 92 mg/dL (10 Jul 2025 21:07)  POCT Blood Glucose.: 207 mg/dL (10 Jul 2025 17:19)  POCT Blood Glucose.: 241 mg/dL (10 Jul 2025 13:25)    I&O's Summary    10 Jul 2025 07:01  -  11 Jul 2025 07:00  --------------------------------------------------------  IN: 140 mL / OUT: 1650 mL / NET: -1510 mL    11 Jul 2025 07:01  -  11 Jul 2025 12:21  --------------------------------------------------------  IN: 0 mL / OUT: 200 mL / NET: -200 mL        PHYSICAL EXAM:  GENERAL: NAD, well-developed  HEAD:  Atraumatic, Normocephalic  EYES: conjunctiva and sclera clear  NECK: Supple, No JVD  CHEST/LUNG: Clear to auscultation bilaterally; No wheeze  HEART: Regular rate and rhythm; No murmurs, rubs, or gallops  ABDOMEN: Soft, Nontender, Nondistended; Bowel sounds present +R NT with browinsh drainage   EXTREMITIES:  2+ Peripheral Pulses, LUE +2 edema , avf   PSYCH: AAOx3    LABS:                        10.0   4.84  )-----------( 107      ( 11 Jul 2025 06:21 )             35.4     07-11    134[L]  |  98  |  56[H]  ----------------------------<  131[H]  4.7   |  20[L]  |  5.08[H]    Ca    9.3      11 Jul 2025 06:21            Urinalysis Basic - ( 11 Jul 2025 06:21 )    Color: x / Appearance: x / SG: x / pH: x  Gluc: 131 mg/dL / Ketone: x  / Bili: x / Urobili: x   Blood: x / Protein: x / Nitrite: x   Leuk Esterase: x / RBC: x / WBC x   Sq Epi: x / Non Sq Epi: x / Bacteria: x        RADIOLOGY & ADDITIONAL TESTS:    Imaging Personally Reviewed:    Consultant(s) Notes Reviewed:      Care Discussed with Consultants/Other Providers:

## 2025-07-12 LAB
ANION GAP SERPL CALC-SCNC: 14 MMOL/L — SIGNIFICANT CHANGE UP (ref 5–17)
BUN SERPL-MCNC: 54 MG/DL — HIGH (ref 7–23)
CALCIUM SERPL-MCNC: 9.5 MG/DL — SIGNIFICANT CHANGE UP (ref 8.4–10.5)
CHLORIDE SERPL-SCNC: 101 MMOL/L — SIGNIFICANT CHANGE UP (ref 96–108)
CO2 SERPL-SCNC: 22 MMOL/L — SIGNIFICANT CHANGE UP (ref 22–31)
CREAT SERPL-MCNC: 4.35 MG/DL — HIGH (ref 0.5–1.3)
EGFR: 13 ML/MIN/1.73M2 — LOW
EGFR: 13 ML/MIN/1.73M2 — LOW
GLUCOSE BLDC GLUCOMTR-MCNC: 142 MG/DL — HIGH (ref 70–99)
GLUCOSE BLDC GLUCOMTR-MCNC: 174 MG/DL — HIGH (ref 70–99)
GLUCOSE BLDC GLUCOMTR-MCNC: 187 MG/DL — HIGH (ref 70–99)
GLUCOSE BLDC GLUCOMTR-MCNC: 70 MG/DL — SIGNIFICANT CHANGE UP (ref 70–99)
GLUCOSE BLDC GLUCOMTR-MCNC: 89 MG/DL — SIGNIFICANT CHANGE UP (ref 70–99)
GLUCOSE SERPL-MCNC: 63 MG/DL — LOW (ref 70–99)
HCT VFR BLD CALC: 34.7 % — LOW (ref 39–50)
HGB BLD-MCNC: 10.6 G/DL — LOW (ref 13–17)
MAGNESIUM SERPL-MCNC: 1.7 MG/DL — SIGNIFICANT CHANGE UP (ref 1.6–2.6)
MCHC RBC-ENTMCNC: 27 PG — SIGNIFICANT CHANGE UP (ref 27–34)
MCHC RBC-ENTMCNC: 30.5 G/DL — LOW (ref 32–36)
MCV RBC AUTO: 88.5 FL — SIGNIFICANT CHANGE UP (ref 80–100)
NRBC # BLD AUTO: 0 K/UL — SIGNIFICANT CHANGE UP (ref 0–0)
NRBC # FLD: 0 K/UL — SIGNIFICANT CHANGE UP (ref 0–0)
NRBC BLD AUTO-RTO: 0 /100 WBCS — SIGNIFICANT CHANGE UP (ref 0–0)
PLATELET # BLD AUTO: 193 K/UL — SIGNIFICANT CHANGE UP (ref 150–400)
PMV BLD: 9.7 FL — SIGNIFICANT CHANGE UP (ref 7–13)
POTASSIUM SERPL-MCNC: 4.2 MMOL/L — SIGNIFICANT CHANGE UP (ref 3.5–5.3)
POTASSIUM SERPL-SCNC: 4.2 MMOL/L — SIGNIFICANT CHANGE UP (ref 3.5–5.3)
RBC # BLD: 3.92 M/UL — LOW (ref 4.2–5.8)
RBC # FLD: 14.6 % — HIGH (ref 10.3–14.5)
SODIUM SERPL-SCNC: 137 MMOL/L — SIGNIFICANT CHANGE UP (ref 135–145)
TACROLIMUS SERPL-MCNC: 1.9 NG/ML — SIGNIFICANT CHANGE UP
WBC # BLD: 4.72 K/UL — SIGNIFICANT CHANGE UP (ref 3.8–10.5)
WBC # FLD AUTO: 4.72 K/UL — SIGNIFICANT CHANGE UP (ref 3.8–10.5)

## 2025-07-12 PROCEDURE — 99232 SBSQ HOSP IP/OBS MODERATE 35: CPT

## 2025-07-12 RX ORDER — TACROLIMUS 0.5 MG/1
4 CAPSULE ORAL DAILY
Refills: 0 | Status: DISCONTINUED | OUTPATIENT
Start: 2025-07-13 | End: 2025-07-18

## 2025-07-12 RX ORDER — TACROLIMUS 0.5 MG/1
4 CAPSULE ORAL ONCE
Refills: 0 | Status: COMPLETED | OUTPATIENT
Start: 2025-07-12 | End: 2025-07-12

## 2025-07-12 RX ADMIN — Medication 2 TABLET(S): at 21:20

## 2025-07-12 RX ADMIN — INSULIN GLARGINE-YFGN 16 UNIT(S): 100 INJECTION, SOLUTION SUBCUTANEOUS at 22:30

## 2025-07-12 RX ADMIN — Medication 60 MILLIGRAM(S): at 05:05

## 2025-07-12 RX ADMIN — CEFTRIAXONE 100 MILLIGRAM(S): 500 INJECTION, POWDER, FOR SOLUTION INTRAMUSCULAR; INTRAVENOUS at 18:38

## 2025-07-12 RX ADMIN — Medication 81 MILLIGRAM(S): at 13:01

## 2025-07-12 RX ADMIN — Medication 650 MILLIGRAM(S): at 13:02

## 2025-07-12 RX ADMIN — Medication 1 APPLICATION(S): at 21:20

## 2025-07-12 RX ADMIN — TACROLIMUS 4 MILLIGRAM(S): 0.5 CAPSULE ORAL at 11:54

## 2025-07-12 RX ADMIN — ATOVAQUONE 1500 MILLIGRAM(S): 750 SUSPENSION ORAL at 21:19

## 2025-07-12 RX ADMIN — METOPROLOL SUCCINATE 50 MILLIGRAM(S): 50 TABLET, EXTENDED RELEASE ORAL at 05:05

## 2025-07-12 RX ADMIN — ATORVASTATIN CALCIUM 10 MILLIGRAM(S): 80 TABLET, FILM COATED ORAL at 21:20

## 2025-07-12 RX ADMIN — TAMSULOSIN HYDROCHLORIDE 0.4 MILLIGRAM(S): 0.4 CAPSULE ORAL at 21:19

## 2025-07-12 RX ADMIN — INSULIN LISPRO 1: 100 INJECTION, SOLUTION INTRAVENOUS; SUBCUTANEOUS at 13:01

## 2025-07-12 RX ADMIN — POLYETHYLENE GLYCOL 3350 17 GRAM(S): 17 POWDER, FOR SOLUTION ORAL at 13:01

## 2025-07-12 RX ADMIN — PREDNISONE 5 MILLIGRAM(S): 20 TABLET ORAL at 05:05

## 2025-07-12 RX ADMIN — Medication 650 MILLIGRAM(S): at 21:19

## 2025-07-12 RX ADMIN — INSULIN LISPRO 4 UNIT(S): 100 INJECTION, SOLUTION INTRAVENOUS; SUBCUTANEOUS at 18:08

## 2025-07-12 RX ADMIN — Medication 650 MILLIGRAM(S): at 05:05

## 2025-07-12 RX ADMIN — Medication 1 APPLICATION(S): at 05:06

## 2025-07-12 RX ADMIN — INSULIN LISPRO 4 UNIT(S): 100 INJECTION, SOLUTION INTRAVENOUS; SUBCUTANEOUS at 13:01

## 2025-07-12 NOTE — PROGRESS NOTE ADULT - NSPROGADDITIONALINFOA_GEN_ALL_CORE
D/w ACP Shinamol
D/w ACP Gem
D/w JENNIFER Early
D/w ACP Gary
D/w JENNIFER Early
d/w JENNIFER Fox
d/w JENNIFER Romano
D/w ACP Gary

## 2025-07-12 NOTE — PROGRESS NOTE ADULT - SUBJECTIVE AND OBJECTIVE BOX
Fernandez Duran M.D.  Division of Hospital Medicine   Available via MS Teams    Patient is a 74y old  Male who presents with a chief complaint of pyelonephritis (10 Jul 2025 14:34)      SUBJECTIVE / OVERNIGHT EVENTS:  Patient is seen and examined at the bedside.    Denies any new complaints.    Renal function is improved from yesterday.  We will restart tacrolimus hemodynamics are fairly stable afebrile on room air.    No further plans for inpatient hemodialysis per Nephrology.    Plan for stent removal with IR on 07/17 in antibiotics still stent is removed.  Continue immunosuppressants    ADDITIONAL REVIEW OF SYSTEMS:    MEDICATIONS  (STANDING):  aspirin  chewable 81 milliGRAM(s) Oral daily  atorvastatin 10 milliGRAM(s) Oral at bedtime  atovaquone  Suspension 1500 milliGRAM(s) Oral daily  cefTRIAXone   IVPB 1000 milliGRAM(s) IV Intermittent every 24 hours  chlorhexidine 2% Cloths 1 Application(s) Topical daily  dextrose 5%. 1000 milliLiter(s) (100 mL/Hr) IV Continuous <Continuous>  dextrose 50% Injectable 25 Gram(s) IV Push once  dextrose 50% Injectable 12.5 Gram(s) IV Push once  dextrose 50% Injectable 25 Gram(s) IV Push once  glucagon  Injectable 1 milliGRAM(s) IntraMuscular once  insulin glargine Injectable (LANTUS) 16 Unit(s) SubCutaneous at bedtime  insulin lispro (ADMELOG) corrective regimen sliding scale   SubCutaneous three times a day before meals  insulin lispro (ADMELOG) corrective regimen sliding scale   SubCutaneous at bedtime  insulin lispro Injectable (ADMELOG) 4 Unit(s) SubCutaneous three times a day before meals  metoprolol succinate ER 50 milliGRAM(s) Oral daily  NIFEdipine XL 60 milliGRAM(s) Oral daily  petrolatum Ophthalmic Ointment 1 Application(s) Both EYES at bedtime  polyethylene glycol 3350 17 Gram(s) Oral daily  predniSONE   Tablet 5 milliGRAM(s) Oral daily  senna 2 Tablet(s) Oral at bedtime  sodium bicarbonate 650 milliGRAM(s) Oral every 8 hours  tacrolimus ER Tablet (ENVARSUS XR) 4 milliGRAM(s) Oral daily  tamsulosin 0.4 milliGRAM(s) Oral at bedtime    MEDICATIONS  (PRN):  acetaminophen     Tablet .. 650 milliGRAM(s) Oral every 6 hours PRN Mild Pain (1 - 3)  dextrose Oral Gel 15 Gram(s) Oral once PRN Blood Glucose LESS THAN 70 milliGRAM(s)/deciliter  ondansetron Injectable 4 milliGRAM(s) IV Push once PRN Nausea and/or Vomiting  sodium chloride 0.9% Bolus. 100 milliLiter(s) IV Bolus every 5 minutes PRN SBP LESS THAN or EQUAL to 90 mmHg      CAPILLARY BLOOD GLUCOSE      POCT Blood Glucose.: 174 mg/dL (12 Jul 2025 12:29)  POCT Blood Glucose.: 70 mg/dL (12 Jul 2025 08:21)  POCT Blood Glucose.: 119 mg/dL (11 Jul 2025 21:15)  POCT Blood Glucose.: 127 mg/dL (11 Jul 2025 17:12)    I&O's Summary    11 Jul 2025 07:01  -  12 Jul 2025 07:00  --------------------------------------------------------  IN: 100 mL / OUT: 1650 mL / NET: -1550 mL    12 Jul 2025 07:01  -  12 Jul 2025 14:18  --------------------------------------------------------  IN: 720 mL / OUT: 400 mL / NET: 320 mL        PHYSICAL EXAM:  Vital Signs Last 24 Hrs  T(C): 37 (12 Jul 2025 12:00), Max: 37 (12 Jul 2025 12:00)  T(F): 98.6 (12 Jul 2025 12:00), Max: 98.6 (12 Jul 2025 12:00)  HR: 91 (12 Jul 2025 12:00) (70 - 91)  BP: 124/69 (12 Jul 2025 12:00) (124/69 - 158/82)  BP(mean): --  RR: 18 (12 Jul 2025 12:00) (18 - 18)  SpO2: 98% (12 Jul 2025 12:00) (98% - 99%)    Parameters below as of 12 Jul 2025 12:00  Patient On (Oxygen Delivery Method): room air        CONSTITUTIONAL: NAD, well-developed  RESPIRATORY: Normal respiratory effort; lungs are clear to auscultation bilaterally  CARDIOVASCULAR: Regular rate and rhythm, normal S1 and S2, no murmur/rub/gallop; No lower extremity edema; Peripheral pulses are 2+ bilaterally  ABDOMEN: Nontender to palpation, normoactive bowel sounds, no rebound/guarding; No hepatosplenomegaly  MUSCLOSKELETAL: no clubbing or cyanosis of digits; no joint swelling or tenderness to palpation  PSYCH: A+O to person, place, and time; affect appropriate    LABS:                        10.6   4.72  )-----------( 193      ( 12 Jul 2025 07:02 )             34.7     07-12    137  |  101  |  54[H]  ----------------------------<  63[L]  4.2   |  22  |  4.35[H]    Ca    9.5      12 Jul 2025 07:00  Mg     1.7     07-12            Urinalysis Basic - ( 12 Jul 2025 07:00 )    Color: x / Appearance: x / SG: x / pH: x  Gluc: 63 mg/dL / Ketone: x  / Bili: x / Urobili: x   Blood: x / Protein: x / Nitrite: x   Leuk Esterase: x / RBC: x / WBC x   Sq Epi: x / Non Sq Epi: x / Bacteria: x          RADIOLOGY & ADDITIONAL TESTS:  Results Reviewed:   Imaging Personally Reviewed:  Electrocardiogram Personally Reviewed:    COORDINATION OF CARE:  Care Discussed with Consultants/Other Providers [Y/N]:  Prior or Outpatient Records Reviewed [Y/N]:

## 2025-07-13 LAB
ANION GAP SERPL CALC-SCNC: 15 MMOL/L — SIGNIFICANT CHANGE UP (ref 5–17)
BUN SERPL-MCNC: 58 MG/DL — HIGH (ref 7–23)
CALCIUM SERPL-MCNC: 9.9 MG/DL — SIGNIFICANT CHANGE UP (ref 8.4–10.5)
CHLORIDE SERPL-SCNC: 101 MMOL/L — SIGNIFICANT CHANGE UP (ref 96–108)
CO2 SERPL-SCNC: 22 MMOL/L — SIGNIFICANT CHANGE UP (ref 22–31)
CREAT SERPL-MCNC: 4.2 MG/DL — HIGH (ref 0.5–1.3)
EGFR: 14 ML/MIN/1.73M2 — LOW
EGFR: 14 ML/MIN/1.73M2 — LOW
GLUCOSE BLDC GLUCOMTR-MCNC: 115 MG/DL — HIGH (ref 70–99)
GLUCOSE BLDC GLUCOMTR-MCNC: 138 MG/DL — HIGH (ref 70–99)
GLUCOSE BLDC GLUCOMTR-MCNC: 138 MG/DL — HIGH (ref 70–99)
GLUCOSE BLDC GLUCOMTR-MCNC: 225 MG/DL — HIGH (ref 70–99)
GLUCOSE SERPL-MCNC: 196 MG/DL — HIGH (ref 70–99)
HCT VFR BLD CALC: 39.4 % — SIGNIFICANT CHANGE UP (ref 39–50)
HGB BLD-MCNC: 11.9 G/DL — LOW (ref 13–17)
MCHC RBC-ENTMCNC: 26.9 PG — LOW (ref 27–34)
MCHC RBC-ENTMCNC: 30.2 G/DL — LOW (ref 32–36)
MCV RBC AUTO: 89.1 FL — SIGNIFICANT CHANGE UP (ref 80–100)
NRBC # BLD AUTO: 0 K/UL — SIGNIFICANT CHANGE UP (ref 0–0)
NRBC # FLD: 0 K/UL — SIGNIFICANT CHANGE UP (ref 0–0)
NRBC BLD AUTO-RTO: 0 /100 WBCS — SIGNIFICANT CHANGE UP (ref 0–0)
PLATELET # BLD AUTO: 202 K/UL — SIGNIFICANT CHANGE UP (ref 150–400)
PMV BLD: 10.4 FL — SIGNIFICANT CHANGE UP (ref 7–13)
POTASSIUM SERPL-MCNC: 5.1 MMOL/L — SIGNIFICANT CHANGE UP (ref 3.5–5.3)
POTASSIUM SERPL-SCNC: 5.1 MMOL/L — SIGNIFICANT CHANGE UP (ref 3.5–5.3)
RBC # BLD: 4.42 M/UL — SIGNIFICANT CHANGE UP (ref 4.2–5.8)
RBC # FLD: 14.6 % — HIGH (ref 10.3–14.5)
SODIUM SERPL-SCNC: 138 MMOL/L — SIGNIFICANT CHANGE UP (ref 135–145)
TACROLIMUS SERPL-MCNC: 5 NG/ML — SIGNIFICANT CHANGE UP
WBC # BLD: 5.88 K/UL — SIGNIFICANT CHANGE UP (ref 3.8–10.5)
WBC # FLD AUTO: 5.88 K/UL — SIGNIFICANT CHANGE UP (ref 3.8–10.5)

## 2025-07-13 PROCEDURE — 99232 SBSQ HOSP IP/OBS MODERATE 35: CPT

## 2025-07-13 RX ADMIN — PREDNISONE 5 MILLIGRAM(S): 20 TABLET ORAL at 05:05

## 2025-07-13 RX ADMIN — TAMSULOSIN HYDROCHLORIDE 0.4 MILLIGRAM(S): 0.4 CAPSULE ORAL at 21:33

## 2025-07-13 RX ADMIN — INSULIN LISPRO 4 UNIT(S): 100 INJECTION, SOLUTION INTRAVENOUS; SUBCUTANEOUS at 17:49

## 2025-07-13 RX ADMIN — Medication 650 MILLIGRAM(S): at 13:06

## 2025-07-13 RX ADMIN — INSULIN GLARGINE-YFGN 16 UNIT(S): 100 INJECTION, SOLUTION SUBCUTANEOUS at 21:34

## 2025-07-13 RX ADMIN — TACROLIMUS 4 MILLIGRAM(S): 0.5 CAPSULE ORAL at 11:32

## 2025-07-13 RX ADMIN — Medication 1 APPLICATION(S): at 05:06

## 2025-07-13 RX ADMIN — INSULIN LISPRO 4 UNIT(S): 100 INJECTION, SOLUTION INTRAVENOUS; SUBCUTANEOUS at 09:24

## 2025-07-13 RX ADMIN — METOPROLOL SUCCINATE 50 MILLIGRAM(S): 50 TABLET, EXTENDED RELEASE ORAL at 05:05

## 2025-07-13 RX ADMIN — INSULIN LISPRO 2: 100 INJECTION, SOLUTION INTRAVENOUS; SUBCUTANEOUS at 13:02

## 2025-07-13 RX ADMIN — ATOVAQUONE 1500 MILLIGRAM(S): 750 SUSPENSION ORAL at 21:32

## 2025-07-13 RX ADMIN — Medication 650 MILLIGRAM(S): at 21:33

## 2025-07-13 RX ADMIN — ATORVASTATIN CALCIUM 10 MILLIGRAM(S): 80 TABLET, FILM COATED ORAL at 21:33

## 2025-07-13 RX ADMIN — POLYETHYLENE GLYCOL 3350 17 GRAM(S): 17 POWDER, FOR SOLUTION ORAL at 13:05

## 2025-07-13 RX ADMIN — CEFTRIAXONE 100 MILLIGRAM(S): 500 INJECTION, POWDER, FOR SOLUTION INTRAMUSCULAR; INTRAVENOUS at 18:31

## 2025-07-13 RX ADMIN — Medication 60 MILLIGRAM(S): at 05:06

## 2025-07-13 RX ADMIN — INSULIN LISPRO 4 UNIT(S): 100 INJECTION, SOLUTION INTRAVENOUS; SUBCUTANEOUS at 13:04

## 2025-07-13 RX ADMIN — Medication 2 TABLET(S): at 21:33

## 2025-07-13 RX ADMIN — Medication 650 MILLIGRAM(S): at 05:05

## 2025-07-13 NOTE — PROGRESS NOTE ADULT - SUBJECTIVE AND OBJECTIVE BOX
Fernandez Duran M.D.  Division of Hospital Medicine   Available via MS Teams    Patient is a 74y old  Male who presents with a chief complaint of pyelonephritis (10 Jul 2025 14:34)      SUBJECTIVE / OVERNIGHT EVENTS:  Patient is seen and examined at the bedside.    Denies any new complaints.    Renal function is improved from yesterday.  We will restart tacrolimus hemodynamics are fairly stable afebrile on room air.    No further plans for inpatient hemodialysis per Nephrology.    Plan for stent removal with IR on 07/17 in antibiotics still stent is removed.  Continue immunosuppressants    ADDITIONAL REVIEW OF SYSTEMS:    MEDICATIONS  (STANDING):  aspirin  chewable 81 milliGRAM(s) Oral daily  atorvastatin 10 milliGRAM(s) Oral at bedtime  atovaquone  Suspension 1500 milliGRAM(s) Oral daily  cefTRIAXone   IVPB 1000 milliGRAM(s) IV Intermittent every 24 hours  chlorhexidine 2% Cloths 1 Application(s) Topical daily  dextrose 5%. 1000 milliLiter(s) (100 mL/Hr) IV Continuous <Continuous>  dextrose 50% Injectable 25 Gram(s) IV Push once  dextrose 50% Injectable 12.5 Gram(s) IV Push once  dextrose 50% Injectable 25 Gram(s) IV Push once  glucagon  Injectable 1 milliGRAM(s) IntraMuscular once  insulin glargine Injectable (LANTUS) 16 Unit(s) SubCutaneous at bedtime  insulin lispro (ADMELOG) corrective regimen sliding scale   SubCutaneous three times a day before meals  insulin lispro (ADMELOG) corrective regimen sliding scale   SubCutaneous at bedtime  insulin lispro Injectable (ADMELOG) 4 Unit(s) SubCutaneous three times a day before meals  metoprolol succinate ER 50 milliGRAM(s) Oral daily  NIFEdipine XL 60 milliGRAM(s) Oral daily  petrolatum Ophthalmic Ointment 1 Application(s) Both EYES at bedtime  polyethylene glycol 3350 17 Gram(s) Oral daily  predniSONE   Tablet 5 milliGRAM(s) Oral daily  senna 2 Tablet(s) Oral at bedtime  sodium bicarbonate 650 milliGRAM(s) Oral every 8 hours  tacrolimus ER Tablet (ENVARSUS XR) 4 milliGRAM(s) Oral daily  tamsulosin 0.4 milliGRAM(s) Oral at bedtime    MEDICATIONS  (PRN):  acetaminophen     Tablet .. 650 milliGRAM(s) Oral every 6 hours PRN Mild Pain (1 - 3)  dextrose Oral Gel 15 Gram(s) Oral once PRN Blood Glucose LESS THAN 70 milliGRAM(s)/deciliter  ondansetron Injectable 4 milliGRAM(s) IV Push once PRN Nausea and/or Vomiting  sodium chloride 0.9% Bolus. 100 milliLiter(s) IV Bolus every 5 minutes PRN SBP LESS THAN or EQUAL to 90 mmHg          PHYSICAL EXAM:  ICU Vital Signs Last 24 Hrs  T(C): 36.5 (13 Jul 2025 20:34), Max: 36.7 (13 Jul 2025 04:25)  T(F): 97.7 (13 Jul 2025 20:34), Max: 98.1 (13 Jul 2025 12:03)  HR: 77 (13 Jul 2025 20:34) (66 - 77)  BP: 134/67 (13 Jul 2025 20:34) (115/63 - 134/67)  BP(mean): --  ABP: --  ABP(mean): --  RR: 18 (13 Jul 2025 20:34) (18 - 18)  SpO2: 99% (13 Jul 2025 20:34) (98% - 99%)    O2 Parameters below as of 13 Jul 2025 20:34  Patient On (Oxygen Delivery Method): room air                CONSTITUTIONAL: NAD, well-developed  RESPIRATORY: Normal respiratory effort; lungs are clear to auscultation bilaterally  CARDIOVASCULAR: Regular rate and rhythm, normal S1 and S2, no murmur/rub/gallop; No lower extremity edema; Peripheral pulses are 2+ bilaterally  ABDOMEN: Nontender to palpation, normoactive bowel sounds, no rebound/guarding; No hepatosplenomegaly  MUSCLOSKELETAL: no clubbing or cyanosis of digits; no joint swelling or tenderness to palpation  PSYCH: A+O to person, place, and time; affect appropriate    LABS:                                     11.9   5.88  )-----------( 202      ( 13 Jul 2025 11:53 )             39.4       07-13    138  |  101  |  58[H]  ----------------------------<  196[H]  5.1   |  22  |  4.20[H]    Ca    9.9      13 Jul 2025 11:53  Mg     1.7     07-12                       Fernandez Duran M.D.  Division of Hospital Medicine   Available via MS Teams    Patient is a 74y old  Male who presents with a chief complaint of pyelonephritis (10 Jul 2025 14:34)      SUBJECTIVE / OVERNIGHT EVENTS:  Patient is seen and examined at the bedside.    Denies any new complaints.    Renal function stable. hemodynamics are fairly stable afebrile on room air.    No further plans for inpatient hemodialysis per Nephrology.    Plan for stent removal with IR on 07/17 in antibiotics still stent is removed.  Continue immunosuppressants    ADDITIONAL REVIEW OF SYSTEMS:    MEDICATIONS  (STANDING):  aspirin  chewable 81 milliGRAM(s) Oral daily  atorvastatin 10 milliGRAM(s) Oral at bedtime  atovaquone  Suspension 1500 milliGRAM(s) Oral daily  cefTRIAXone   IVPB 1000 milliGRAM(s) IV Intermittent every 24 hours  chlorhexidine 2% Cloths 1 Application(s) Topical daily  dextrose 5%. 1000 milliLiter(s) (100 mL/Hr) IV Continuous <Continuous>  dextrose 50% Injectable 25 Gram(s) IV Push once  dextrose 50% Injectable 12.5 Gram(s) IV Push once  dextrose 50% Injectable 25 Gram(s) IV Push once  glucagon  Injectable 1 milliGRAM(s) IntraMuscular once  insulin glargine Injectable (LANTUS) 16 Unit(s) SubCutaneous at bedtime  insulin lispro (ADMELOG) corrective regimen sliding scale   SubCutaneous three times a day before meals  insulin lispro (ADMELOG) corrective regimen sliding scale   SubCutaneous at bedtime  insulin lispro Injectable (ADMELOG) 4 Unit(s) SubCutaneous three times a day before meals  metoprolol succinate ER 50 milliGRAM(s) Oral daily  NIFEdipine XL 60 milliGRAM(s) Oral daily  petrolatum Ophthalmic Ointment 1 Application(s) Both EYES at bedtime  polyethylene glycol 3350 17 Gram(s) Oral daily  predniSONE   Tablet 5 milliGRAM(s) Oral daily  senna 2 Tablet(s) Oral at bedtime  sodium bicarbonate 650 milliGRAM(s) Oral every 8 hours  tacrolimus ER Tablet (ENVARSUS XR) 4 milliGRAM(s) Oral daily  tamsulosin 0.4 milliGRAM(s) Oral at bedtime    MEDICATIONS  (PRN):  acetaminophen     Tablet .. 650 milliGRAM(s) Oral every 6 hours PRN Mild Pain (1 - 3)  dextrose Oral Gel 15 Gram(s) Oral once PRN Blood Glucose LESS THAN 70 milliGRAM(s)/deciliter  ondansetron Injectable 4 milliGRAM(s) IV Push once PRN Nausea and/or Vomiting  sodium chloride 0.9% Bolus. 100 milliLiter(s) IV Bolus every 5 minutes PRN SBP LESS THAN or EQUAL to 90 mmHg          PHYSICAL EXAM:  ICU Vital Signs Last 24 Hrs  T(C): 36.5 (13 Jul 2025 20:34), Max: 36.7 (13 Jul 2025 04:25)  T(F): 97.7 (13 Jul 2025 20:34), Max: 98.1 (13 Jul 2025 12:03)  HR: 77 (13 Jul 2025 20:34) (66 - 77)  BP: 134/67 (13 Jul 2025 20:34) (115/63 - 134/67)  BP(mean): --  ABP: --  ABP(mean): --  RR: 18 (13 Jul 2025 20:34) (18 - 18)  SpO2: 99% (13 Jul 2025 20:34) (98% - 99%)    O2 Parameters below as of 13 Jul 2025 20:34  Patient On (Oxygen Delivery Method): room air                CONSTITUTIONAL: NAD, well-developed  RESPIRATORY: Normal respiratory effort; lungs are clear to auscultation bilaterally  CARDIOVASCULAR: Regular rate and rhythm, normal S1 and S2, no murmur/rub/gallop; No lower extremity edema; Peripheral pulses are 2+ bilaterally  ABDOMEN: Nontender to palpation, normoactive bowel sounds, no rebound/guarding; No hepatosplenomegaly  MUSCLOSKELETAL: no clubbing or cyanosis of digits; no joint swelling or tenderness to palpation  PSYCH: A+O to person, place, and time; affect appropriate    LABS:                                     11.9   5.88  )-----------( 202      ( 13 Jul 2025 11:53 )             39.4       07-13    138  |  101  |  58[H]  ----------------------------<  196[H]  5.1   |  22  |  4.20[H]    Ca    9.9      13 Jul 2025 11:53  Mg     1.7     07-12

## 2025-07-14 LAB
ANION GAP SERPL CALC-SCNC: 15 MMOL/L — SIGNIFICANT CHANGE UP (ref 5–17)
BUN SERPL-MCNC: 57 MG/DL — HIGH (ref 7–23)
CALCIUM SERPL-MCNC: 9.7 MG/DL — SIGNIFICANT CHANGE UP (ref 8.4–10.5)
CHLORIDE SERPL-SCNC: 104 MMOL/L — SIGNIFICANT CHANGE UP (ref 96–108)
CO2 SERPL-SCNC: 22 MMOL/L — SIGNIFICANT CHANGE UP (ref 22–31)
CREAT SERPL-MCNC: 3.88 MG/DL — HIGH (ref 0.5–1.3)
EGFR: 15 ML/MIN/1.73M2 — LOW
EGFR: 15 ML/MIN/1.73M2 — LOW
GLUCOSE BLDC GLUCOMTR-MCNC: 115 MG/DL — HIGH (ref 70–99)
GLUCOSE BLDC GLUCOMTR-MCNC: 148 MG/DL — HIGH (ref 70–99)
GLUCOSE BLDC GLUCOMTR-MCNC: 186 MG/DL — HIGH (ref 70–99)
GLUCOSE BLDC GLUCOMTR-MCNC: 77 MG/DL — SIGNIFICANT CHANGE UP (ref 70–99)
GLUCOSE SERPL-MCNC: 71 MG/DL — SIGNIFICANT CHANGE UP (ref 70–99)
POTASSIUM SERPL-MCNC: 4.9 MMOL/L — SIGNIFICANT CHANGE UP (ref 3.5–5.3)
POTASSIUM SERPL-SCNC: 4.9 MMOL/L — SIGNIFICANT CHANGE UP (ref 3.5–5.3)
SODIUM SERPL-SCNC: 141 MMOL/L — SIGNIFICANT CHANGE UP (ref 135–145)
TACROLIMUS SERPL-MCNC: 5.1 NG/ML — SIGNIFICANT CHANGE UP

## 2025-07-14 PROCEDURE — 82803 BLOOD GASES ANY COMBINATION: CPT

## 2025-07-14 PROCEDURE — 83935 ASSAY OF URINE OSMOLALITY: CPT

## 2025-07-14 PROCEDURE — 84133 ASSAY OF URINE POTASSIUM: CPT

## 2025-07-14 PROCEDURE — 82435 ASSAY OF BLOOD CHLORIDE: CPT

## 2025-07-14 PROCEDURE — 93990 DOPPLER FLOW TESTING: CPT

## 2025-07-14 PROCEDURE — 85018 HEMOGLOBIN: CPT

## 2025-07-14 PROCEDURE — C1729: CPT

## 2025-07-14 PROCEDURE — 94640 AIRWAY INHALATION TREATMENT: CPT

## 2025-07-14 PROCEDURE — 84540 ASSAY OF URINE/UREA-N: CPT

## 2025-07-14 PROCEDURE — 85610 PROTHROMBIN TIME: CPT

## 2025-07-14 PROCEDURE — 84132 ASSAY OF SERUM POTASSIUM: CPT

## 2025-07-14 PROCEDURE — 93005 ELECTROCARDIOGRAM TRACING: CPT

## 2025-07-14 PROCEDURE — 82947 ASSAY GLUCOSE BLOOD QUANT: CPT

## 2025-07-14 PROCEDURE — 83735 ASSAY OF MAGNESIUM: CPT

## 2025-07-14 PROCEDURE — 87641 MR-STAPH DNA AMP PROBE: CPT

## 2025-07-14 PROCEDURE — 86901 BLOOD TYPING SEROLOGIC RH(D): CPT

## 2025-07-14 PROCEDURE — 83036 HEMOGLOBIN GLYCOSYLATED A1C: CPT

## 2025-07-14 PROCEDURE — 83605 ASSAY OF LACTIC ACID: CPT

## 2025-07-14 PROCEDURE — 74176 CT ABD & PELVIS W/O CONTRAST: CPT

## 2025-07-14 PROCEDURE — 87340 HEPATITIS B SURFACE AG IA: CPT

## 2025-07-14 PROCEDURE — 87086 URINE CULTURE/COLONY COUNT: CPT

## 2025-07-14 PROCEDURE — 85025 COMPLETE CBC W/AUTO DIFF WBC: CPT

## 2025-07-14 PROCEDURE — 76776 US EXAM K TRANSPL W/DOPPLER: CPT

## 2025-07-14 PROCEDURE — 87640 STAPH A DNA AMP PROBE: CPT

## 2025-07-14 PROCEDURE — 84156 ASSAY OF PROTEIN URINE: CPT

## 2025-07-14 PROCEDURE — 86850 RBC ANTIBODY SCREEN: CPT

## 2025-07-14 PROCEDURE — 87186 SC STD MICRODIL/AGAR DIL: CPT

## 2025-07-14 PROCEDURE — C1894: CPT

## 2025-07-14 PROCEDURE — 85014 HEMATOCRIT: CPT

## 2025-07-14 PROCEDURE — 85730 THROMBOPLASTIN TIME PARTIAL: CPT

## 2025-07-14 PROCEDURE — 86900 BLOOD TYPING SEROLOGIC ABO: CPT

## 2025-07-14 PROCEDURE — 93971 EXTREMITY STUDY: CPT

## 2025-07-14 PROCEDURE — 76775 US EXAM ABDO BACK WALL LIM: CPT

## 2025-07-14 PROCEDURE — 80053 COMPREHEN METABOLIC PANEL: CPT

## 2025-07-14 PROCEDURE — 82330 ASSAY OF CALCIUM: CPT

## 2025-07-14 PROCEDURE — 82570 ASSAY OF URINE CREATININE: CPT

## 2025-07-14 PROCEDURE — 82962 GLUCOSE BLOOD TEST: CPT

## 2025-07-14 PROCEDURE — 36415 COLL VENOUS BLD VENIPUNCTURE: CPT

## 2025-07-14 PROCEDURE — 99232 SBSQ HOSP IP/OBS MODERATE 35: CPT

## 2025-07-14 PROCEDURE — 87077 CULTURE AEROBIC IDENTIFY: CPT

## 2025-07-14 PROCEDURE — 80197 ASSAY OF TACROLIMUS: CPT

## 2025-07-14 PROCEDURE — 84100 ASSAY OF PHOSPHORUS: CPT

## 2025-07-14 PROCEDURE — 81001 URINALYSIS AUTO W/SCOPE: CPT

## 2025-07-14 PROCEDURE — 84295 ASSAY OF SERUM SODIUM: CPT

## 2025-07-14 PROCEDURE — 85027 COMPLETE CBC AUTOMATED: CPT

## 2025-07-14 PROCEDURE — 99233 SBSQ HOSP IP/OBS HIGH 50: CPT

## 2025-07-14 PROCEDURE — 84300 ASSAY OF URINE SODIUM: CPT

## 2025-07-14 PROCEDURE — 80048 BASIC METABOLIC PNL TOTAL CA: CPT

## 2025-07-14 PROCEDURE — C1769: CPT

## 2025-07-14 RX ADMIN — Medication 650 MILLIGRAM(S): at 05:02

## 2025-07-14 RX ADMIN — PREDNISONE 5 MILLIGRAM(S): 20 TABLET ORAL at 05:03

## 2025-07-14 RX ADMIN — POLYETHYLENE GLYCOL 3350 17 GRAM(S): 17 POWDER, FOR SOLUTION ORAL at 09:01

## 2025-07-14 RX ADMIN — Medication 650 MILLIGRAM(S): at 21:59

## 2025-07-14 RX ADMIN — ATORVASTATIN CALCIUM 10 MILLIGRAM(S): 80 TABLET, FILM COATED ORAL at 21:59

## 2025-07-14 RX ADMIN — METOPROLOL SUCCINATE 50 MILLIGRAM(S): 50 TABLET, EXTENDED RELEASE ORAL at 05:03

## 2025-07-14 RX ADMIN — INSULIN LISPRO 4 UNIT(S): 100 INJECTION, SOLUTION INTRAVENOUS; SUBCUTANEOUS at 08:51

## 2025-07-14 RX ADMIN — CEFTRIAXONE 100 MILLIGRAM(S): 500 INJECTION, POWDER, FOR SOLUTION INTRAMUSCULAR; INTRAVENOUS at 19:00

## 2025-07-14 RX ADMIN — TACROLIMUS 4 MILLIGRAM(S): 0.5 CAPSULE ORAL at 09:01

## 2025-07-14 RX ADMIN — ATOVAQUONE 1500 MILLIGRAM(S): 750 SUSPENSION ORAL at 21:59

## 2025-07-14 RX ADMIN — Medication 60 MILLIGRAM(S): at 05:02

## 2025-07-14 RX ADMIN — TAMSULOSIN HYDROCHLORIDE 0.4 MILLIGRAM(S): 0.4 CAPSULE ORAL at 21:59

## 2025-07-14 RX ADMIN — Medication 2 TABLET(S): at 21:59

## 2025-07-14 RX ADMIN — Medication 1 APPLICATION(S): at 05:03

## 2025-07-14 RX ADMIN — INSULIN LISPRO 4 UNIT(S): 100 INJECTION, SOLUTION INTRAVENOUS; SUBCUTANEOUS at 12:46

## 2025-07-14 RX ADMIN — INSULIN LISPRO 1: 100 INJECTION, SOLUTION INTRAVENOUS; SUBCUTANEOUS at 12:46

## 2025-07-14 RX ADMIN — INSULIN LISPRO 4 UNIT(S): 100 INJECTION, SOLUTION INTRAVENOUS; SUBCUTANEOUS at 18:02

## 2025-07-14 RX ADMIN — Medication 650 MILLIGRAM(S): at 12:48

## 2025-07-14 RX ADMIN — INSULIN GLARGINE-YFGN 16 UNIT(S): 100 INJECTION, SOLUTION SUBCUTANEOUS at 21:59

## 2025-07-14 NOTE — PROGRESS NOTE ADULT - SUBJECTIVE AND OBJECTIVE BOX
[facility] Division of Hospital Medicine  Josh Karan  MS Teams      SUBJECTIVE / OVERNIGHT EVENTS:  No events overnight  PCN with + UOP, clear  Cr downtrending  ADDITIONAL REVIEW OF SYSTEMS:    MEDICATIONS  (STANDING):  atorvastatin 10 milliGRAM(s) Oral at bedtime  atovaquone  Suspension 1500 milliGRAM(s) Oral daily  cefTRIAXone   IVPB 1000 milliGRAM(s) IV Intermittent every 24 hours  chlorhexidine 2% Cloths 1 Application(s) Topical daily  dextrose 5%. 1000 milliLiter(s) (100 mL/Hr) IV Continuous <Continuous>  dextrose 50% Injectable 25 Gram(s) IV Push once  dextrose 50% Injectable 12.5 Gram(s) IV Push once  dextrose 50% Injectable 25 Gram(s) IV Push once  glucagon  Injectable 1 milliGRAM(s) IntraMuscular once  insulin glargine Injectable (LANTUS) 16 Unit(s) SubCutaneous at bedtime  insulin lispro (ADMELOG) corrective regimen sliding scale   SubCutaneous three times a day before meals  insulin lispro (ADMELOG) corrective regimen sliding scale   SubCutaneous at bedtime  insulin lispro Injectable (ADMELOG) 4 Unit(s) SubCutaneous three times a day before meals  metoprolol succinate ER 50 milliGRAM(s) Oral daily  NIFEdipine XL 60 milliGRAM(s) Oral daily  petrolatum Ophthalmic Ointment 1 Application(s) Both EYES at bedtime  polyethylene glycol 3350 17 Gram(s) Oral daily  predniSONE   Tablet 5 milliGRAM(s) Oral daily  senna 2 Tablet(s) Oral at bedtime  sodium bicarbonate 650 milliGRAM(s) Oral every 8 hours  tacrolimus ER Tablet (ENVARSUS XR) 4 milliGRAM(s) Oral daily  tamsulosin 0.4 milliGRAM(s) Oral at bedtime    MEDICATIONS  (PRN):  acetaminophen     Tablet .. 650 milliGRAM(s) Oral every 6 hours PRN Mild Pain (1 - 3)  dextrose Oral Gel 15 Gram(s) Oral once PRN Blood Glucose LESS THAN 70 milliGRAM(s)/deciliter  ondansetron Injectable 4 milliGRAM(s) IV Push once PRN Nausea and/or Vomiting  sodium chloride 0.9% Bolus. 100 milliLiter(s) IV Bolus every 5 minutes PRN SBP LESS THAN or EQUAL to 90 mmHg      I&O's Summary    13 Jul 2025 07:01  -  14 Jul 2025 07:00  --------------------------------------------------------  IN: 500 mL / OUT: 600 mL / NET: -100 mL    14 Jul 2025 07:01  -  14 Jul 2025 15:19  --------------------------------------------------------  IN: 720 mL / OUT: 1000 mL / NET: -280 mL        PHYSICAL EXAM:  Vital Signs Last 24 Hrs  T(C): 36.7 (14 Jul 2025 11:45), Max: 36.7 (14 Jul 2025 11:45)  T(F): 98.1 (14 Jul 2025 11:45), Max: 98.1 (14 Jul 2025 11:45)  HR: 80 (14 Jul 2025 11:45) (72 - 80)  BP: 113/62 (14 Jul 2025 11:45) (113/62 - 148/72)  BP(mean): --  RR: 18 (14 Jul 2025 11:45) (18 - 18)  SpO2: 99% (14 Jul 2025 11:45) (98% - 99%)    Parameters below as of 14 Jul 2025 11:45  Patient On (Oxygen Delivery Method): room air      CONSTITUTIONAL: NAD, well-developed  RESPIRATORY: Normal respiratory effort; lungs are clear to auscultation bilaterally  CARDIOVASCULAR: Regular rate and rhythm, normal S1 and S2, no murmur; No lower extremity edema  ABDOMEN: Nontender to palpation, normoactive bowel sounds  MUSCULOSKELETAL: no clubbing or cyanosis of digits; L arm >R  PSYCH: A+O to person, place, and time; affect appropriate  NEUROLOGY: CN 2-12 are intact and symmetric; no gross sensory deficits   SKIN: No rashes; no palpable lesions    LABS:                        11.9   5.88  )-----------( 202      ( 13 Jul 2025 11:53 )             39.4     07-14    141  |  104  |  57[H]  ----------------------------<  71  4.9   |  22  |  3.88[H]    Ca    9.7      14 Jul 2025 08:53            Urinalysis Basic - ( 14 Jul 2025 08:53 )    Color: x / Appearance: x / SG: x / pH: x  Gluc: 71 mg/dL / Ketone: x  / Bili: x / Urobili: x   Blood: x / Protein: x / Nitrite: x   Leuk Esterase: x / RBC: x / WBC x   Sq Epi: x / Non Sq Epi: x / Bacteria: x

## 2025-07-14 NOTE — PROGRESS NOTE ADULT - PROBLEM SELECTOR PLAN 1
- CHELA on CKD 2/2 malpositioned ureteral stent  - S/p PCN placement 7/7   - UA+, UCx + proteus   - C/w IV ceftriaxone    - Bactrim changed to mepron for ppx  - Sodium bicarb TID  - Monitor urine output, I/Os, Cr  - serial bladder scans for PVR  - Has been receiving HD, currently on hold, assessing daily

## 2025-07-14 NOTE — PROGRESS NOTE ADULT - ASSESSMENT
Interventional Radiology Follow-Up Note    This is a 74y Male s/p transplant kidney nephrostomy tube placement on  in Interventional Radiology with Dr. Kim.     S: Patient seen and examined @ bedside. Denies any pain.     Medication:  aspirin  chewable: ()  atovaquone  Suspension: ()  cefTRIAXone   IVPB: ()  metoprolol succinate ER: ()  NIFEdipine XL: ()    Vitals:  T(F): 97.8, Max: 98.1 (12:03)  HR: 72  BP: 148/72  RR: 18  SpO2: 98%    Physical Exam:  General: Nontoxic, in NAD, A&O x3.  Abdomen: soft, NTND, no peritoneal signs.  Drain Device: Drain intact attached to gravity bag with clear rand output. Dressing clean, dry, intact. Drain flushed w 5cc NS w/o difficulty. No pericatheter leakage noted, +fluid return noted in tubing.     24hr Drain output: 200ml     LABS:  WBC 5.88 / Hgb 11.9 / Hct 39.4 / Plt 202  Na 138 / K 5.1 / CO2 22 / Cl 101 / BUN 58 / Cr 4.20 / Glucose 196  ALT -- / AST -- / Alk Phos -- / Tbili --  Ptt -- / Pt -- / INR --      Assessment/Plan:   75 yo M PMH ESRD s/p Kidney Transplant Recipient () c/b hydronephrosis in transplant kidney managed with ureteral stent exchanges (last exchange per chart review 2025 with Dr. 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 Patient is currently s/p transplant kidney nephrostomy tube placement on  in Interventional Radiology with Dr. Kim.     - IR requested for stent removal sooner given proteus growing in urine, stent is serving as nidus for recurrent infection and risk of keeping patient on prolonged abx. Per d/w transplant ID, once stent removed may be able to d/c abx.   - Bleeding risk discussed with ID and primary team given tract not mature and need to upsize for stent removal.   - Case d/w Dr. Kim, will tentatively plan for stent retrieval on  with Dr. Kim.   - Will need to hold asa x 5days with tentative plan to resume 24hrs post procedure if no signs/ concern for bleeding. PLAN FOR LAST DOSE SAT    -NPO after midnight Wed    - STAT am labs  - H/H stable, continue to monitor h/h  - WBC 5.88, remains afebrile. Cx +proteus, remains on ceftriaxone  - No further plans for inpatient hemodialysis per Nephrology  - Hemodynamically stable, continue to trend vs/labs  - flush drain with 5cc NS daily forward only; DO NOT aspirate  - change dressing q3 days or when dressing is saturated  - they will benefit from VNS service to help with drainage catheter care; they should continue same drainage catheter care as an outpatient.  - IR to follow  - d/w primary team  - continue global management per primary team     Any questions or concerns regarding above please reach out to IR:   -Available on microsoft teams  -During working hours (7a-5p): call [phone]  -Emergent issues after 5pm: page: [phone]  -Non-emergent consults: Please place a [place] order "IR Consult" with an appropriate callback number  -Scheduling questions: [phone]  -Clinic/Outpatient [phone]      ANDRE Ochoa- BC  Available on teams

## 2025-07-14 NOTE — PROGRESS NOTE ADULT - ASSESSMENT
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 Pending stent retrieval 7/17.

## 2025-07-15 LAB
ANION GAP SERPL CALC-SCNC: 15 MMOL/L — SIGNIFICANT CHANGE UP (ref 5–17)
BUN SERPL-MCNC: 59 MG/DL — HIGH (ref 7–23)
CALCIUM SERPL-MCNC: 9.6 MG/DL — SIGNIFICANT CHANGE UP (ref 8.4–10.5)
CHLORIDE SERPL-SCNC: 102 MMOL/L — SIGNIFICANT CHANGE UP (ref 96–108)
CO2 SERPL-SCNC: 21 MMOL/L — LOW (ref 22–31)
CREAT SERPL-MCNC: 4.13 MG/DL — HIGH (ref 0.5–1.3)
EGFR: 14 ML/MIN/1.73M2 — LOW
EGFR: 14 ML/MIN/1.73M2 — LOW
GLUCOSE BLDC GLUCOMTR-MCNC: 122 MG/DL — HIGH (ref 70–99)
GLUCOSE BLDC GLUCOMTR-MCNC: 125 MG/DL — HIGH (ref 70–99)
GLUCOSE BLDC GLUCOMTR-MCNC: 142 MG/DL — HIGH (ref 70–99)
GLUCOSE BLDC GLUCOMTR-MCNC: 145 MG/DL — HIGH (ref 70–99)
GLUCOSE BLDC GLUCOMTR-MCNC: 70 MG/DL — SIGNIFICANT CHANGE UP (ref 70–99)
GLUCOSE BLDC GLUCOMTR-MCNC: 82 MG/DL — SIGNIFICANT CHANGE UP (ref 70–99)
GLUCOSE SERPL-MCNC: 112 MG/DL — HIGH (ref 70–99)
POTASSIUM SERPL-MCNC: 4.7 MMOL/L — SIGNIFICANT CHANGE UP (ref 3.5–5.3)
POTASSIUM SERPL-SCNC: 4.7 MMOL/L — SIGNIFICANT CHANGE UP (ref 3.5–5.3)
SODIUM SERPL-SCNC: 138 MMOL/L — SIGNIFICANT CHANGE UP (ref 135–145)
TACROLIMUS SERPL-MCNC: 5.8 NG/ML — SIGNIFICANT CHANGE UP

## 2025-07-15 PROCEDURE — 99232 SBSQ HOSP IP/OBS MODERATE 35: CPT

## 2025-07-15 PROCEDURE — 99232 SBSQ HOSP IP/OBS MODERATE 35: CPT | Mod: GC

## 2025-07-15 RX ORDER — CEFTRIAXONE 500 MG/1
1000 INJECTION, POWDER, FOR SOLUTION INTRAMUSCULAR; INTRAVENOUS EVERY 24 HOURS
Refills: 0 | Status: DISCONTINUED | OUTPATIENT
Start: 2025-07-15 | End: 2025-07-18

## 2025-07-15 RX ADMIN — INSULIN LISPRO 4 UNIT(S): 100 INJECTION, SOLUTION INTRAVENOUS; SUBCUTANEOUS at 17:40

## 2025-07-15 RX ADMIN — METOPROLOL SUCCINATE 50 MILLIGRAM(S): 50 TABLET, EXTENDED RELEASE ORAL at 05:08

## 2025-07-15 RX ADMIN — TAMSULOSIN HYDROCHLORIDE 0.4 MILLIGRAM(S): 0.4 CAPSULE ORAL at 22:39

## 2025-07-15 RX ADMIN — Medication 2 TABLET(S): at 22:39

## 2025-07-15 RX ADMIN — POLYETHYLENE GLYCOL 3350 17 GRAM(S): 17 POWDER, FOR SOLUTION ORAL at 12:48

## 2025-07-15 RX ADMIN — INSULIN LISPRO 4 UNIT(S): 100 INJECTION, SOLUTION INTRAVENOUS; SUBCUTANEOUS at 12:47

## 2025-07-15 RX ADMIN — PREDNISONE 5 MILLIGRAM(S): 20 TABLET ORAL at 05:08

## 2025-07-15 RX ADMIN — Medication 650 MILLIGRAM(S): at 22:39

## 2025-07-15 RX ADMIN — INSULIN GLARGINE-YFGN 16 UNIT(S): 100 INJECTION, SOLUTION SUBCUTANEOUS at 22:38

## 2025-07-15 RX ADMIN — INSULIN LISPRO 4 UNIT(S): 100 INJECTION, SOLUTION INTRAVENOUS; SUBCUTANEOUS at 08:49

## 2025-07-15 RX ADMIN — Medication 1 APPLICATION(S): at 05:12

## 2025-07-15 RX ADMIN — CEFTRIAXONE 100 MILLIGRAM(S): 500 INJECTION, POWDER, FOR SOLUTION INTRAMUSCULAR; INTRAVENOUS at 08:04

## 2025-07-15 RX ADMIN — ATORVASTATIN CALCIUM 10 MILLIGRAM(S): 80 TABLET, FILM COATED ORAL at 22:39

## 2025-07-15 RX ADMIN — Medication 650 MILLIGRAM(S): at 14:10

## 2025-07-15 RX ADMIN — Medication 60 MILLIGRAM(S): at 05:08

## 2025-07-15 RX ADMIN — Medication 650 MILLIGRAM(S): at 05:08

## 2025-07-15 RX ADMIN — TACROLIMUS 4 MILLIGRAM(S): 0.5 CAPSULE ORAL at 09:06

## 2025-07-15 NOTE — PROGRESS NOTE ADULT - SUBJECTIVE AND OBJECTIVE BOX
[facility] DIVISION OF KIDNEY DISEASES AND HYPERTENSION -- FOLLOW UP NOTE  --------------------------------------------------------------------------------  Chief Complaint: vs/p DDRT, CHELA on CKD    24 hour events/subjective: Pt. seen and examined at bedside earlier today. Urinating well with armenta and via PCN output.     PAST HISTORY  --------------------------------------------------------------------------------  No significant changes to PMH, PSH, FHx, SHx, unless otherwise noted    ALLERGIES & MEDICATIONS  --------------------------------------------------------------------------------  Allergies    No Known Allergies    Intolerances      Standing Inpatient Medications  atorvastatin 10 milliGRAM(s) Oral at bedtime  atovaquone  Suspension 1500 milliGRAM(s) Oral daily  cefTRIAXone   IVPB 1000 milliGRAM(s) IV Intermittent every 24 hours  chlorhexidine 2% Cloths 1 Application(s) Topical daily  dextrose 5%. 1000 milliLiter(s) IV Continuous <Continuous>  dextrose 50% Injectable 25 Gram(s) IV Push once  dextrose 50% Injectable 12.5 Gram(s) IV Push once  dextrose 50% Injectable 25 Gram(s) IV Push once  glucagon  Injectable 1 milliGRAM(s) IntraMuscular once  insulin glargine Injectable (LANTUS) 16 Unit(s) SubCutaneous at bedtime  insulin lispro (ADMELOG) corrective regimen sliding scale   SubCutaneous three times a day before meals  insulin lispro (ADMELOG) corrective regimen sliding scale   SubCutaneous at bedtime  insulin lispro Injectable (ADMELOG) 4 Unit(s) SubCutaneous three times a day before meals  metoprolol succinate ER 50 milliGRAM(s) Oral daily  NIFEdipine XL 60 milliGRAM(s) Oral daily  petrolatum Ophthalmic Ointment 1 Application(s) Both EYES at bedtime  polyethylene glycol 3350 17 Gram(s) Oral daily  predniSONE   Tablet 5 milliGRAM(s) Oral daily  senna 2 Tablet(s) Oral at bedtime  sodium bicarbonate 650 milliGRAM(s) Oral every 8 hours  tacrolimus ER Tablet (ENVARSUS XR) 4 milliGRAM(s) Oral daily  tamsulosin 0.4 milliGRAM(s) Oral at bedtime    PRN Inpatient Medications  acetaminophen     Tablet .. 650 milliGRAM(s) Oral every 6 hours PRN  dextrose Oral Gel 15 Gram(s) Oral once PRN  ondansetron Injectable 4 milliGRAM(s) IV Push once PRN  sodium chloride 0.9% Bolus. 100 milliLiter(s) IV Bolus every 5 minutes PRN      REVIEW OF SYSTEMS  --------------------------------------------------------------------------------  Gen: No fevers/chills  Respiratory: No dyspnea, cough,   CV: No chest pain, PND, orthopnea  GI: No abdominal pain, diarrhea, constipation, nausea, vomiting  Transplant: No pain  : +PCN +armenta  MSK: +LUE edema.  Neuro: No dizziness/lightheadedness  Access: LUE AVF      All other systems were reviewed and are negative, except as noted.    VITALS/PHYSICAL EXAM  --------------------------------------------------------------------------------  T(C): 36.7 (07-15-25 @ 09:18), Max: 36.9 (07-14-25 @ 20:30)  HR: 62 (07-15-25 @ 09:18) (62 - 80)  BP: 112/70 (07-15-25 @ 09:18) (112/70 - 149/74)  RR: 18 (07-15-25 @ 09:18) (18 - 18)  SpO2: 97% (07-15-25 @ 09:18) (97% - 99%)  Wt(kg): --        07-14-25 @ 07:01  -  07-15-25 @ 07:00  --------------------------------------------------------  IN: 1820 mL / OUT: 2150 mL / NET: -330 mL    07-15-25 @ 07:01  -  07-15-25 @ 11:21  --------------------------------------------------------  IN: 0 mL / OUT: 300 mL / NET: -300 mL        Physical Exam:  	Gen: NAD, able to speak in full sentences   	HEENT: PERRL, MMM   	Pulm: CTA B/L, no crackles   	CV: RRR, S1S2+  	Abd: +BS, soft          Transplant: No tenderness, swelling  	: +armenta, +R PCN  	MSK: no edema   	Psych: Normal affect and mood  	Skin: Warm          Access: YOMAIRA TRUJILLO with edema +thrill   	Skin: Warm          Access:    LABS/STUDIES  --------------------------------------------------------------------------------              11.9   5.88  >-----------<  202      [07-13-25 @ 11:53]              39.4     138  |  102  |  59  ----------------------------<  112      [07-15-25 @ 06:52]  4.7   |  21  |  4.13        Ca     9.6     [07-15-25 @ 06:52]      Creatinine Trend:  SCr 4.13 [07-15 @ 06:52]  SCr 3.88 [07-14 @ 08:53]  SCr 4.20 [07-13 @ 11:53]  SCr 4.35 [07-12 @ 07:00]  SCr 5.08 [07-11 @ 06:21]    Tacrolimus (), Serum: 5.8 ng/mL (07-15 @ 06:56)  Tacrolimus (), Serum: 5.1 ng/mL (07-14 @ 08:53)  Tacrolimus (), Serum: 5.0 ng/mL (07-13 @ 11:53)  Tacrolimus (), Serum: 1.9 ng/mL (07-12 @ 07:02)            Urinalysis - [07-15-25 @ 06:52]      Color  / Appearance  / SG  / pH       Gluc 112 / Ketone   / Bili  / Urobili        Blood  / Protein  / Leuk Est  / Nitrite       RBC  / WBC  / Hyaline  / Gran  / Sq Epi  / Non Sq Epi  / Bacteria         HBsAg Nonreact      [07-03-25 @ 17:35]        IMAGING/RADIOLOGY: Reviewed.

## 2025-07-15 NOTE — PROGRESS NOTE ADULT - ASSESSMENT
75 yo M h/o HTN, T2DM, Hep C, ESRD and kidney, renal cancer, prostate cancer, kidney transplant recipient  in 2018  Sent in by nephrologist Dr. Bhakta for rise in Scr. Transplant nephrology consulted for CHELA on CKD in DDRT patient    1. s/p Hep C+ DDRT in 7/2018 ([facility]) currently admitted for CHELA on CKD  Transplant complicated by DGF, mild rejection treated with steroids, distal ureteral stricture requiring multiple procedures PCN, ureteral dilations, currently gets stent exchanges with Dr. Phillip.  Pts aaron creatinine 1.9 but had CHELA with pyelonephritis. Has had multiple episodes of CHELA. Last creatinine 3.97 in setting of likely UTI and moderate hydronephrosis on sono (4/2025). He was seen by urology and placed on flomax. Pt also has internal JJ stent being exchanged by Dr. Phillip in January 2025. Scr on admission is elevated at 5.57 on 7/1. SCr currently 5.0 s/p armenta placement and PCN placement.   UA with proteins, large Blood with RBC, and positive LE. UPCR 0.9  US Transplanted Kidney: Mild hydronephrosis with ureteral thickening, grossly unchanged from prior. Ureteral stent reidentified.  - s/p armenta catheter placement. UOP is 925cc in 24 hours.   - s/p IR placement of PCN 7/7/25. Monitor output, increasing to 1.2L/24 hrs.   - Plan for stent removal with IR on 7/17 and abx till stent removed.   - No further plans for iHD at this time. Will assess for HD daily.   - Monitor labs and I/Os. Avoid nephrotoxins including, ACE/ARB, NSAIDs, contrast, etc. Dose medications as per eGFR.     2. Immunosuppression:   Simulect induction, ENV 6mg daily, target 5-7, MMF 250mg BID and pred 5.   - Dose tacro per level. continue to monitor tacro levels daily - 30 mins before the morning dose.  - Continue Envarsus 4mg daily and prednisone.   - Hold MMF for infection, can restart after stent removal  PPX: cont mepron.     3. DM2 - on glimepiride and Rybelsus - controlled. ISS and monitor BGM  4. HTN - Cont home regimen  5. Oxalaturia - continue calcium carbonate 600mgs BID with meals, and continue sodium citrate 15 ml BID.    If you have any questions, please feel free to contact me:  Kathrine Luo MD PGY-5  Nephrology Chief Fellow  [employer] Teams (Preferred)/ Pager [phone]   (After 5pm or on weekends please page the on-call fellow)

## 2025-07-15 NOTE — PROGRESS NOTE ADULT - SUBJECTIVE AND OBJECTIVE BOX
[facility] Division of Hospital Medicine  Josh Karan  MS Teams      SUBJECTIVE / OVERNIGHT EVENTS:  No events overnight  + UOP in PCN  denies f/chills/ab pain    ADDITIONAL REVIEW OF SYSTEMS:    MEDICATIONS  (STANDING):  atorvastatin 10 milliGRAM(s) Oral at bedtime  atovaquone  Suspension 1500 milliGRAM(s) Oral daily  cefTRIAXone   IVPB 1000 milliGRAM(s) IV Intermittent every 24 hours  chlorhexidine 2% Cloths 1 Application(s) Topical daily  dextrose 5%. 1000 milliLiter(s) (100 mL/Hr) IV Continuous <Continuous>  dextrose 50% Injectable 25 Gram(s) IV Push once  dextrose 50% Injectable 12.5 Gram(s) IV Push once  dextrose 50% Injectable 25 Gram(s) IV Push once  glucagon  Injectable 1 milliGRAM(s) IntraMuscular once  insulin glargine Injectable (LANTUS) 16 Unit(s) SubCutaneous at bedtime  insulin lispro (ADMELOG) corrective regimen sliding scale   SubCutaneous three times a day before meals  insulin lispro (ADMELOG) corrective regimen sliding scale   SubCutaneous at bedtime  insulin lispro Injectable (ADMELOG) 4 Unit(s) SubCutaneous three times a day before meals  metoprolol succinate ER 50 milliGRAM(s) Oral daily  NIFEdipine XL 60 milliGRAM(s) Oral daily  petrolatum Ophthalmic Ointment 1 Application(s) Both EYES at bedtime  polyethylene glycol 3350 17 Gram(s) Oral daily  predniSONE   Tablet 5 milliGRAM(s) Oral daily  senna 2 Tablet(s) Oral at bedtime  sodium bicarbonate 650 milliGRAM(s) Oral every 8 hours  tacrolimus ER Tablet (ENVARSUS XR) 4 milliGRAM(s) Oral daily  tamsulosin 0.4 milliGRAM(s) Oral at bedtime    MEDICATIONS  (PRN):  acetaminophen     Tablet .. 650 milliGRAM(s) Oral every 6 hours PRN Mild Pain (1 - 3)  dextrose Oral Gel 15 Gram(s) Oral once PRN Blood Glucose LESS THAN 70 milliGRAM(s)/deciliter  ondansetron Injectable 4 milliGRAM(s) IV Push once PRN Nausea and/or Vomiting  sodium chloride 0.9% Bolus. 100 milliLiter(s) IV Bolus every 5 minutes PRN SBP LESS THAN or EQUAL to 90 mmHg      I&O's Summary    14 Jul 2025 07:01  -  15 Jul 2025 07:00  --------------------------------------------------------  IN: 1820 mL / OUT: 2150 mL / NET: -330 mL        PHYSICAL EXAM:  Vital Signs Last 24 Hrs  T(C): 36.9 (15 Jul 2025 04:01), Max: 36.9 (14 Jul 2025 20:30)  T(F): 98.4 (15 Jul 2025 04:01), Max: 98.5 (14 Jul 2025 20:30)  HR: 66 (15 Jul 2025 04:01) (66 - 80)  BP: 149/74 (15 Jul 2025 04:01) (113/62 - 149/74)  BP(mean): --  RR: 18 (15 Jul 2025 04:01) (18 - 18)  SpO2: 98% (15 Jul 2025 04:01) (98% - 99%)    Parameters below as of 15 Jul 2025 04:01  Patient On (Oxygen Delivery Method): room air    CONSTITUTIONAL: NAD, well-developed  RESPIRATORY: Normal respiratory effort; lungs are clear to auscultation bilaterally  CARDIOVASCULAR: Regular rate and rhythm, normal S1 and S2, no murmur; No lower extremity edema  ABDOMEN: Nontender to palpation, normoactive bowel sounds  MUSCULOSKELETAL: no clubbing or cyanosis of digits; L arm >R  PSYCH: A+O to person, place, and time; affect appropriate  NEUROLOGY: CN 2-12 are intact and symmetric; no gross sensory deficits   SKIN: No rashes; no palpable lesions, PCN in place    LABS:                        11.9   5.88  )-----------( 202      ( 13 Jul 2025 11:53 )             39.4     07-15    138  |  102  |  59[H]  ----------------------------<  112[H]  4.7   |  21[L]  |  4.13[H]    Ca    9.6      15 Jul 2025 06:52            Urinalysis Basic - ( 15 Jul 2025 06:52 )    Color: x / Appearance: x / SG: x / pH: x  Gluc: 112 mg/dL / Ketone: x  / Bili: x / Urobili: x   Blood: x / Protein: x / Nitrite: x   Leuk Esterase: x / RBC: x / WBC x   Sq Epi: x / Non Sq Epi: x / Bacteria: x

## 2025-07-16 LAB
ANION GAP SERPL CALC-SCNC: 14 MMOL/L — SIGNIFICANT CHANGE UP (ref 5–17)
BUN SERPL-MCNC: 60 MG/DL — HIGH (ref 7–23)
CALCIUM SERPL-MCNC: 9.5 MG/DL — SIGNIFICANT CHANGE UP (ref 8.4–10.5)
CHLORIDE SERPL-SCNC: 107 MMOL/L — SIGNIFICANT CHANGE UP (ref 96–108)
CO2 SERPL-SCNC: 19 MMOL/L — LOW (ref 22–31)
CREAT SERPL-MCNC: 3.91 MG/DL — HIGH (ref 0.5–1.3)
EGFR: 15 ML/MIN/1.73M2 — LOW
EGFR: 15 ML/MIN/1.73M2 — LOW
GLUCOSE BLDC GLUCOMTR-MCNC: 124 MG/DL — HIGH (ref 70–99)
GLUCOSE BLDC GLUCOMTR-MCNC: 125 MG/DL — HIGH (ref 70–99)
GLUCOSE BLDC GLUCOMTR-MCNC: 166 MG/DL — HIGH (ref 70–99)
GLUCOSE BLDC GLUCOMTR-MCNC: 190 MG/DL — HIGH (ref 70–99)
GLUCOSE BLDC GLUCOMTR-MCNC: 60 MG/DL — LOW (ref 70–99)
GLUCOSE SERPL-MCNC: 46 MG/DL — CRITICAL LOW (ref 70–99)
POTASSIUM SERPL-MCNC: 4.4 MMOL/L — SIGNIFICANT CHANGE UP (ref 3.5–5.3)
POTASSIUM SERPL-SCNC: 4.4 MMOL/L — SIGNIFICANT CHANGE UP (ref 3.5–5.3)
SODIUM SERPL-SCNC: 140 MMOL/L — SIGNIFICANT CHANGE UP (ref 135–145)
TACROLIMUS SERPL-MCNC: 5.1 NG/ML — SIGNIFICANT CHANGE UP

## 2025-07-16 PROCEDURE — 99232 SBSQ HOSP IP/OBS MODERATE 35: CPT

## 2025-07-16 RX ORDER — INSULIN GLARGINE-YFGN 100 [IU]/ML
10 INJECTION, SOLUTION SUBCUTANEOUS AT BEDTIME
Refills: 0 | Status: DISCONTINUED | OUTPATIENT
Start: 2025-07-16 | End: 2025-07-18

## 2025-07-16 RX ORDER — DEXTROSE 50 % IN WATER 50 %
15 SYRINGE (ML) INTRAVENOUS ONCE
Refills: 0 | Status: COMPLETED | OUTPATIENT
Start: 2025-07-16 | End: 2025-07-16

## 2025-07-16 RX ADMIN — CEFTRIAXONE 100 MILLIGRAM(S): 500 INJECTION, POWDER, FOR SOLUTION INTRAMUSCULAR; INTRAVENOUS at 08:57

## 2025-07-16 RX ADMIN — TAMSULOSIN HYDROCHLORIDE 0.4 MILLIGRAM(S): 0.4 CAPSULE ORAL at 21:15

## 2025-07-16 RX ADMIN — INSULIN LISPRO 1: 100 INJECTION, SOLUTION INTRAVENOUS; SUBCUTANEOUS at 17:43

## 2025-07-16 RX ADMIN — INSULIN LISPRO 1: 100 INJECTION, SOLUTION INTRAVENOUS; SUBCUTANEOUS at 12:50

## 2025-07-16 RX ADMIN — PREDNISONE 5 MILLIGRAM(S): 20 TABLET ORAL at 05:55

## 2025-07-16 RX ADMIN — Medication 15 GRAM(S): at 08:49

## 2025-07-16 RX ADMIN — POLYETHYLENE GLYCOL 3350 17 GRAM(S): 17 POWDER, FOR SOLUTION ORAL at 12:51

## 2025-07-16 RX ADMIN — TACROLIMUS 4 MILLIGRAM(S): 0.5 CAPSULE ORAL at 08:55

## 2025-07-16 RX ADMIN — Medication 650 MILLIGRAM(S): at 13:49

## 2025-07-16 RX ADMIN — METOPROLOL SUCCINATE 50 MILLIGRAM(S): 50 TABLET, EXTENDED RELEASE ORAL at 05:55

## 2025-07-16 RX ADMIN — INSULIN LISPRO 4 UNIT(S): 100 INJECTION, SOLUTION INTRAVENOUS; SUBCUTANEOUS at 12:51

## 2025-07-16 RX ADMIN — Medication 650 MILLIGRAM(S): at 05:55

## 2025-07-16 RX ADMIN — Medication 2 TABLET(S): at 21:15

## 2025-07-16 RX ADMIN — Medication 60 MILLIGRAM(S): at 05:55

## 2025-07-16 RX ADMIN — INSULIN LISPRO 4 UNIT(S): 100 INJECTION, SOLUTION INTRAVENOUS; SUBCUTANEOUS at 17:43

## 2025-07-16 RX ADMIN — Medication 650 MILLIGRAM(S): at 21:15

## 2025-07-16 RX ADMIN — INSULIN GLARGINE-YFGN 10 UNIT(S): 100 INJECTION, SOLUTION SUBCUTANEOUS at 21:26

## 2025-07-16 RX ADMIN — ATORVASTATIN CALCIUM 10 MILLIGRAM(S): 80 TABLET, FILM COATED ORAL at 21:15

## 2025-07-16 NOTE — PROGRESS NOTE ADULT - PROBLEM SELECTOR PLAN 1
- CHELA on CKD 2/2 malpositioned ureteral stent  - S/p PCN placement 7/7   - UA+, UCx + proteus   - C/w IV ceftriaxone through retrieval  - Bactrim changed to mepron for ppx  - Sodium bicarb TID  - Monitor urine output, I/Os, Cr  - Had been receiving HD, currently on hold, assessing need daily

## 2025-07-16 NOTE — PROGRESS NOTE ADULT - SUBJECTIVE AND OBJECTIVE BOX
[facility] Division of Hospital Medicine  Josh Russo  MS Teams      SUBJECTIVE / OVERNIGHT EVENTS:  No events overnight  no complaints this am  denies f/chills/ab pain  wants to walk around    ADDITIONAL REVIEW OF SYSTEMS:    MEDICATIONS  (STANDING):  atorvastatin 10 milliGRAM(s) Oral at bedtime  cefTRIAXone   IVPB 1000 milliGRAM(s) IV Intermittent every 24 hours  chlorhexidine 2% Cloths 1 Application(s) Topical daily  dextrose 5%. 1000 milliLiter(s) (100 mL/Hr) IV Continuous <Continuous>  dextrose 50% Injectable 25 Gram(s) IV Push once  dextrose 50% Injectable 12.5 Gram(s) IV Push once  dextrose 50% Injectable 25 Gram(s) IV Push once  glucagon  Injectable 1 milliGRAM(s) IntraMuscular once  insulin glargine Injectable (LANTUS) 10 Unit(s) SubCutaneous at bedtime  insulin lispro (ADMELOG) corrective regimen sliding scale   SubCutaneous three times a day before meals  insulin lispro (ADMELOG) corrective regimen sliding scale   SubCutaneous at bedtime  insulin lispro Injectable (ADMELOG) 4 Unit(s) SubCutaneous three times a day before meals  metoprolol succinate ER 50 milliGRAM(s) Oral daily  NIFEdipine XL 60 milliGRAM(s) Oral daily  petrolatum Ophthalmic Ointment 1 Application(s) Both EYES at bedtime  polyethylene glycol 3350 17 Gram(s) Oral daily  predniSONE   Tablet 5 milliGRAM(s) Oral daily  senna 2 Tablet(s) Oral at bedtime  sodium bicarbonate 650 milliGRAM(s) Oral every 8 hours  tacrolimus ER Tablet (ENVARSUS XR) 4 milliGRAM(s) Oral daily  tamsulosin 0.4 milliGRAM(s) Oral at bedtime    MEDICATIONS  (PRN):  acetaminophen     Tablet .. 650 milliGRAM(s) Oral every 6 hours PRN Mild Pain (1 - 3)  dextrose Oral Gel 15 Gram(s) Oral once PRN Blood Glucose LESS THAN 70 milliGRAM(s)/deciliter  ondansetron Injectable 4 milliGRAM(s) IV Push once PRN Nausea and/or Vomiting  sodium chloride 0.9% Bolus. 100 milliLiter(s) IV Bolus every 5 minutes PRN SBP LESS THAN or EQUAL to 90 mmHg      I&O's Summary    15 Jul 2025 07:01  -  16 Jul 2025 07:00  --------------------------------------------------------  IN: 0 mL / OUT: 1420 mL / NET: -1420 mL        PHYSICAL EXAM:  Vital Signs Last 24 Hrs  T(C): 36.5 (16 Jul 2025 05:40), Max: 36.9 (15 Jul 2025 12:40)  T(F): 97.7 (16 Jul 2025 05:40), Max: 98.4 (15 Jul 2025 12:40)  HR: 61 (16 Jul 2025 05:40) (61 - 65)  BP: 150/79 (16 Jul 2025 05:40) (124/73 - 159/74)  BP(mean): --  RR: 18 (16 Jul 2025 05:40) (18 - 18)  SpO2: 97% (16 Jul 2025 05:40) (97% - 98%)    Parameters below as of 16 Jul 2025 05:40  Patient On (Oxygen Delivery Method): room air      CONSTITUTIONAL: NAD, well-developed  RESPIRATORY: Normal respiratory effort; lungs are clear to auscultation bilaterally  CARDIOVASCULAR: Regular rate and rhythm, normal S1 and S2, no murmur; No lower extremity edema  ABDOMEN: Nontender to palpation, normoactive bowel sounds  MUSCULOSKELETAL: no clubbing or cyanosis of digits; L arm >R  PSYCH: A+O to person, place, and time; affect appropriate  NEUROLOGY: CN 2-12 are intact and symmetric; no gross sensory deficits   SKIN: No rashes; no palpable lesions, PCN in place      LABS:    07-16    140  |  107  |  60[H]  ----------------------------<  46[LL]  4.4   |  19[L]  |  3.91[H]    Ca    9.5      16 Jul 2025 07:17            Urinalysis Basic - ( 16 Jul 2025 07:17 )    Color: x / Appearance: x / SG: x / pH: x  Gluc: 46 mg/dL / Ketone: x  / Bili: x / Urobili: x   Blood: x / Protein: x / Nitrite: x   Leuk Esterase: x / RBC: x / WBC x   Sq Epi: x / Non Sq Epi: x / Bacteria: x

## 2025-07-16 NOTE — PROGRESS NOTE ADULT - ASSESSMENT
75 yo M PMH ESRD s/p Kidney Transplant Recipient (2018) c/b hydronephrosis in transplant kidney managed with ureteral stent exchanges (last exchange per chart review 02/2025 with Dr. 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 Pending stent retrieval 7/17.

## 2025-07-16 NOTE — PROVIDER CONTACT NOTE (HYPOGLYCEMIA EVENT) - NS PROVIDER CONTACT BACKGROUND-HYPO
Age: 74y    Gender: Male    POCT Blood Glucose:  190 mg/dL (07-16-25 @ 17:32)  166 mg/dL (07-16-25 @ 12:24)  125 mg/dL (07-16-25 @ 09:13)  60 mg/dL (07-16-25 @ 08:34)  122 mg/dL (07-15-25 @ 22:37)  145 mg/dL (07-15-25 @ 21:16)      eMAR:atorvastatin   10 milliGRAM(s) Oral (07-15-25 @ 22:39)    dextrose Oral Gel   15 Gram(s) Oral (07-16-25 @ 08:49)    insulin glargine Injectable (LANTUS)   16 Unit(s) SubCutaneous (07-15-25 @ 22:38)    insulin lispro (ADMELOG) corrective regimen sliding scale   1 Unit(s) SubCutaneous (07-16-25 @ 17:43)   1 Unit(s) SubCutaneous (07-16-25 @ 12:50)    insulin lispro Injectable (ADMELOG)   4 Unit(s) SubCutaneous (07-16-25 @ 17:43)   4 Unit(s) SubCutaneous (07-16-25 @ 12:51)    predniSONE   Tablet   5 milliGRAM(s) Oral (07-16-25 @ 05:55)

## 2025-07-16 NOTE — CHART NOTE - NSCHARTNOTESELECT_GEN_ALL_CORE
Hemodialysis/Event Note
IR Pre Op
Hemodialysis/Event Note
Hemodialysis/Event Note
Nutrition Services

## 2025-07-16 NOTE — CHART NOTE - NSCHARTNOTEFT_GEN_A_CORE
I have seen the patient and reviewed dialysis prescription and flow sheet. Dialysis access is functioning well. Patient is tolerating dialysis well with no acute symptoms or distress. Dialysis prescription has been adjusted for optimized control of volume status, uremia and electrolytes. Management of additional metabolic abnormalities/anemia will continue to be addressed on follow up.
NUTRITION FOLLOW UP NOTE    PATIENT SEEN FOR: follow up on [facility]    SOURCE: [x] Patient  [x] Current Medical Record  [] RN  [x] Family/support person at bedside  [] Patient unavailable/inappropriate  [] Other:    CHART REVIEWED/EVENTS NOTED.  [x] No changes to nutrition care plan to note  [] Nutrition Status:    DIET ORDER:   Diet, Consistent Carbohydrate Renal/No Snacks:   DASH/TLC {Sodium & Cholesterol Restricted} (DASH)  No Concentrated Potassium  Low Sodium  No Concentrated Phosphorus (25)      CURRENT DIET ORDER IS:  [x] Appropriate:  [] Inadequate:  [] Other:    NUTRITION INTAKE/PROVISION:  [x] PO: >75%  [] Enteral Nutrition:  [] Parenteral Nutrition:    ANTHROPOMETRICS:  Drug Dosing Weight  Height (cm): 177.8 (2025 08:21)  Weight (kg): 87.7 (2025 08:21)  BMI (kg/m2): 27.7 (2025 08:21)  BSA (m2): 2.06 (2025 08:21)  Weights:   Daily Weight in k.2 (), Weight in k.6 (07-15), Weight in k.5 (), Weight in k.8 (), Weight in k.2 (), Weight in k (07-10)     NUTRITIONALLY PERTINENT MEDICATIONS:  MEDICATIONS  (STANDING):  atorvastatin  cefTRIAXone   IVPB  dextrose 5%.  dextrose 50% Injectable  dextrose 50% Injectable  dextrose 50% Injectable  glucagon  Injectable  insulin glargine Injectable (LANTUS)  insulin lispro (ADMELOG) corrective regimen sliding scale  insulin lispro (ADMELOG) corrective regimen sliding scale  insulin lispro Injectable (ADMELOG)  metoprolol succinate ER  NIFEdipine XL  polyethylene glycol 3350  predniSONE   Tablet  senna  sodium bicarbonate       NUTRITIONALLY PERTINENT LABS:   Na140 mmol/L Glu 46 mg/dL[LL] K+ 4.4 mmol/L Cr  3.91 mg/dL[H] BUN 60 mg/dL[H]  25 @ 09:54 a1c 6.3    A1C with Estimated Average Glucose Result: 6.3 % (25 @ 09:54)  A1C with Estimated Average Glucose Result: 6.4 % (25 @ 17:05)  A1C with Estimated Average Glucose Result: 6.7 % (25 @ 06:14)          Finger Sticks:  POCT Blood Glucose.: 166 mg/dL ( @ 12:24)  POCT Blood Glucose.: 125 mg/dL ( @ 09:13)  POCT Blood Glucose.: 60 mg/dL ( @ 08:34)  POCT Blood Glucose.: 122 mg/dL (07-15 @ 22:37)  POCT Blood Glucose.: 145 mg/dL (07-15 @ 21:16)  POCT Blood Glucose.: 142 mg/dL (07-15 @ 17:12)      NUTRITIONALLY PERTINENT MEDICATIONS/LABS:  [x] Reviewed  [] Relevant notes on medications/labs:    EDEMA:  [x] Reviewed  [x] Relevant notes: +2 left arm    GI/ I&O:  [x] Reviewed  [x] Relevant notes: no BM noted in flow sheet, ordered for Miralax and senna  [] Other:    SKIN:   [x] No pressure injuries documented, per nursing flowsheet  [] Pressure injury previously noted  [] Change in pressure injury documentation:  [] Other:    ESTIMATED NEEDS:  [x] No change:  [] Updated:  Weight used for calculations	current weight on 7/10  Estimated Energy Needs Weight (lbs)	180.7 lb  Estimated Energy Needs Weight (kg)	81.9 kg  Estimated Energy Needs From (silvestre/kg)	25  Estimated Energy Needs To (silvestre/kg)	30  Estimated Energy Needs Calculated From (silvestre/kg)	  Estimated Energy Needs Calculated To (silvestre/kg)	245  Weight used for calculations	current weight  Estimated Protein Needs Weight (lbs)	180.7 lb  Estimated Protein Needs Weight (kg)	81.9 kg  Estimated Protein Needs From (g/kg)	0.8  Estimated Protein Needs To (g/kg)	1  Estimated Protein Needs Calculated From (g/kg)	65.52  Estimated Protein Needs Calculated To (g/kg)	81.9  Estimated Fluid Needs Weight (lbs)	180.7 lb  Estimated Fluid Needs Weight (kg)	81.9 kg  Estimated Fluid Needs From (ml/kg)	25  Estimated Fluid Needs To (ml/kg)	30  Estimated Fluid Needs Calculated From (ml/kg)	  Estimated Fluid Needs Calculated To (ml/kg)	2457      NUTRITION DIAGNOSIS:  [x] Prior Dx: Food & Nutrition Related Knowledge Deficit  [] New Dx:    EDUCATION:  [] Yes:  [x] Not appropriate/warranted    NUTRITION CARE PLAN:  1. Diet: continue consistent carbohydrate renal/DASH-monitor need for fluid restriction inherent in renal diet  no other needs indicated at this time  pt refused need for diet ed reinforcement on phosphorous content of foods    [] Achieved - Continue current nutrition intervention(s)  [] Current medical condition precludes nutrition intervention at this time.    MONITORING AND EVALUATION:   RD remains available upon request and will follow up per protocol.    Name Ragini Vasquez MA, RD, CDN/TEAMS   Available on MS TEAMS
Patient is seen at hemodialysis unit  VS noted  On exam:  No distress  Not dyspnoeic  Left UE AVF functioning with blood flow 300 ml/min    LABS/STUDIES  --------------------------------------------------------------------------------    138  |  98  |  27  ----------------------------<  121      [07-05-25 @ 06:37]  4.3   |  25  |  4.97        Ca     9.2     [07-05-25 @ 06:37]            Creatinine Trend:  SCr 4.97 [07-05 @ 06:37]  SCr 4.63 [07-04 @ 09:37]  SCr 5.22 [07-03 @ 09:55]  SCr 5.25 [07-02 @ 06:46]  SCr 5.57 [07-01 @ 22:39]    Tacrolimus (), Serum: 19.6 ng/mL (07-05 @ 07:10)  Tacrolimus (), Serum: 7.8 ng/mL (07-03 @ 09:54)  Tacrolimus (), Serum: 4.6 ng/mL (07-02 @ 06:46)            Urine Creatinine 126      [07-01-25 @ 21:18]  Urine Protein 113      [07-01-25 @ 21:18]  Urine Sodium 58      [07-01-25 @ 21:18]  Urine Urea Nitrogen 503      [07-01-25 @ 21:18]  Urine Potassium 31      [07-01-25 @ 21:18]  Urine Osmolality 368      [07-01-25 @ 21:18]    Plan:  Continue hemodialysis  Vascular surgeon to evaluate left UE AVF  IR procedure early next week after review of  repeat ultrasound by IR  I was present during and reviewed clinical and lab data as well as assessment and plan as documented  . Please contact if any additional questions with any change in clinical condition or on availability of any additional information or reports.
I have seen the patient and reviewed dialysis prescription and flowsheet. Dialysis access is functioning well. Patient is tolerating dialysis well with no acute symptoms or distress. Dialysis prescription has been adjusted for optimized control of volume status, uremia and electrolytes. Management of additional metabolic abnormalities/anemia will continue to be addressed on follow up.
IR Pre Op: Transplant ureteral stent removal  Assessment/Plan:  75 yo M PMH ESRD s/p Kidney Transplant Recipient (2018) c/b hydronephrosis in transplant kidney managed with ureteral stent exchanges (last exchange per chart review 02/2025 with Dr. 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 Patient is currently s/p transplant kidney nephrostomy tube placement on 7/7 in Interventional Radiology with Dr. Kim. IR requested for stent removal sooner given proteus growing in urine, stent is serving as nidus for recurrent infection and risk of keeping patient on prolonged abx. Per d/w transplant ID, once stent removed may be able to d/c abx.     -Bleeding risk discussed with ID and primary team given tract not mature and need to upsize for stent removal.   -Will need to hold asa x 5days with tentative plan to resume 24hrs post procedure if no signs/ concern for bleeding. PLAN FOR LAST DOSE SAT 7/12  -NPO after midnight Wed 7/16   - please place IR procedure order  - STAT labs in AM (cbc,coags, bmp, T&S) to be resulted by 7am  - d/w primary team

## 2025-07-17 LAB
ALBUMIN SERPL ELPH-MCNC: 3.3 G/DL — SIGNIFICANT CHANGE UP (ref 3.3–5)
ALP SERPL-CCNC: 62 U/L — SIGNIFICANT CHANGE UP (ref 40–120)
ALT FLD-CCNC: 15 U/L — SIGNIFICANT CHANGE UP (ref 10–45)
ANION GAP SERPL CALC-SCNC: 14 MMOL/L — SIGNIFICANT CHANGE UP (ref 5–17)
APTT BLD: 31.4 SEC — SIGNIFICANT CHANGE UP (ref 26.1–36.8)
AST SERPL-CCNC: 22 U/L — SIGNIFICANT CHANGE UP (ref 10–40)
BASOPHILS # BLD AUTO: 0.06 K/UL — SIGNIFICANT CHANGE UP (ref 0–0.2)
BASOPHILS NFR BLD AUTO: 1 % — SIGNIFICANT CHANGE UP (ref 0–2)
BILIRUB SERPL-MCNC: 0.2 MG/DL — SIGNIFICANT CHANGE UP (ref 0.2–1.2)
BLD GP AB SCN SERPL QL: NEGATIVE — SIGNIFICANT CHANGE UP
BUN SERPL-MCNC: 61 MG/DL — HIGH (ref 7–23)
CALCIUM SERPL-MCNC: 9.1 MG/DL — SIGNIFICANT CHANGE UP (ref 8.4–10.5)
CHLORIDE SERPL-SCNC: 105 MMOL/L — SIGNIFICANT CHANGE UP (ref 96–108)
CO2 SERPL-SCNC: 21 MMOL/L — LOW (ref 22–31)
CREAT SERPL-MCNC: 4 MG/DL — HIGH (ref 0.5–1.3)
EGFR: 15 ML/MIN/1.73M2 — LOW
EGFR: 15 ML/MIN/1.73M2 — LOW
EOSINOPHIL # BLD AUTO: 0.12 K/UL — SIGNIFICANT CHANGE UP (ref 0–0.5)
EOSINOPHIL NFR BLD AUTO: 1.9 % — SIGNIFICANT CHANGE UP (ref 0–6)
GLUCOSE BLDC GLUCOMTR-MCNC: 105 MG/DL — HIGH (ref 70–99)
GLUCOSE BLDC GLUCOMTR-MCNC: 138 MG/DL — HIGH (ref 70–99)
GLUCOSE BLDC GLUCOMTR-MCNC: 92 MG/DL — SIGNIFICANT CHANGE UP (ref 70–99)
GLUCOSE SERPL-MCNC: 73 MG/DL — SIGNIFICANT CHANGE UP (ref 70–99)
HCT VFR BLD CALC: 30.9 % — LOW (ref 39–50)
HGB BLD-MCNC: 9.4 G/DL — LOW (ref 13–17)
IMM GRANULOCYTES # BLD AUTO: 0.01 K/UL — SIGNIFICANT CHANGE UP (ref 0–0.07)
IMM GRANULOCYTES NFR BLD AUTO: 0.2 % — SIGNIFICANT CHANGE UP (ref 0–0.9)
INR BLD: 0.94 RATIO — SIGNIFICANT CHANGE UP (ref 0.85–1.16)
LYMPHOCYTES # BLD AUTO: 2.99 K/UL — SIGNIFICANT CHANGE UP (ref 1–3.3)
LYMPHOCYTES NFR BLD AUTO: 48.5 % — HIGH (ref 13–44)
MAGNESIUM SERPL-MCNC: 1.5 MG/DL — LOW (ref 1.6–2.6)
MCHC RBC-ENTMCNC: 26.9 PG — LOW (ref 27–34)
MCHC RBC-ENTMCNC: 30.4 G/DL — LOW (ref 32–36)
MCV RBC AUTO: 88.3 FL — SIGNIFICANT CHANGE UP (ref 80–100)
MONOCYTES # BLD AUTO: 0.6 K/UL — SIGNIFICANT CHANGE UP (ref 0–0.9)
MONOCYTES NFR BLD AUTO: 9.7 % — SIGNIFICANT CHANGE UP (ref 2–14)
NEUTROPHILS # BLD AUTO: 2.38 K/UL — SIGNIFICANT CHANGE UP (ref 1.8–7.4)
NEUTROPHILS NFR BLD AUTO: 38.7 % — LOW (ref 43–77)
NRBC # BLD AUTO: 0 K/UL — SIGNIFICANT CHANGE UP (ref 0–0)
NRBC # FLD: 0 K/UL — SIGNIFICANT CHANGE UP (ref 0–0)
NRBC BLD AUTO-RTO: 0 /100 WBCS — SIGNIFICANT CHANGE UP (ref 0–0)
PLATELET # BLD AUTO: 184 K/UL — SIGNIFICANT CHANGE UP (ref 150–400)
PMV BLD: 10 FL — SIGNIFICANT CHANGE UP (ref 7–13)
POTASSIUM SERPL-MCNC: 4.6 MMOL/L — SIGNIFICANT CHANGE UP (ref 3.5–5.3)
POTASSIUM SERPL-SCNC: 4.6 MMOL/L — SIGNIFICANT CHANGE UP (ref 3.5–5.3)
PROT SERPL-MCNC: 5.8 G/DL — LOW (ref 6–8.3)
PROTHROM AB SERPL-ACNC: 10.7 SEC — SIGNIFICANT CHANGE UP (ref 9.9–13.4)
RBC # BLD: 3.5 M/UL — LOW (ref 4.2–5.8)
RBC # FLD: 14.6 % — HIGH (ref 10.3–14.5)
RH IG SCN BLD-IMP: POSITIVE — SIGNIFICANT CHANGE UP
SODIUM SERPL-SCNC: 140 MMOL/L — SIGNIFICANT CHANGE UP (ref 135–145)
TACROLIMUS SERPL-MCNC: 6 NG/ML — SIGNIFICANT CHANGE UP
WBC # BLD: 6.16 K/UL — SIGNIFICANT CHANGE UP (ref 3.8–10.5)
WBC # FLD AUTO: 6.16 K/UL — SIGNIFICANT CHANGE UP (ref 3.8–10.5)

## 2025-07-17 PROCEDURE — 87077 CULTURE AEROBIC IDENTIFY: CPT

## 2025-07-17 PROCEDURE — 82962 GLUCOSE BLOOD TEST: CPT

## 2025-07-17 PROCEDURE — 82570 ASSAY OF URINE CREATININE: CPT

## 2025-07-17 PROCEDURE — C1773: CPT

## 2025-07-17 PROCEDURE — 87641 MR-STAPH DNA AMP PROBE: CPT

## 2025-07-17 PROCEDURE — C1769: CPT

## 2025-07-17 PROCEDURE — 83605 ASSAY OF LACTIC ACID: CPT

## 2025-07-17 PROCEDURE — 85027 COMPLETE CBC AUTOMATED: CPT

## 2025-07-17 PROCEDURE — 94640 AIRWAY INHALATION TREATMENT: CPT

## 2025-07-17 PROCEDURE — 85014 HEMATOCRIT: CPT

## 2025-07-17 PROCEDURE — 83735 ASSAY OF MAGNESIUM: CPT

## 2025-07-17 PROCEDURE — 84295 ASSAY OF SERUM SODIUM: CPT

## 2025-07-17 PROCEDURE — 93990 DOPPLER FLOW TESTING: CPT

## 2025-07-17 PROCEDURE — 84132 ASSAY OF SERUM POTASSIUM: CPT

## 2025-07-17 PROCEDURE — 86850 RBC ANTIBODY SCREEN: CPT

## 2025-07-17 PROCEDURE — 86900 BLOOD TYPING SEROLOGIC ABO: CPT

## 2025-07-17 PROCEDURE — 83935 ASSAY OF URINE OSMOLALITY: CPT

## 2025-07-17 PROCEDURE — 84540 ASSAY OF URINE/UREA-N: CPT

## 2025-07-17 PROCEDURE — 84300 ASSAY OF URINE SODIUM: CPT

## 2025-07-17 PROCEDURE — 80197 ASSAY OF TACROLIMUS: CPT

## 2025-07-17 PROCEDURE — 87186 SC STD MICRODIL/AGAR DIL: CPT

## 2025-07-17 PROCEDURE — C1729: CPT

## 2025-07-17 PROCEDURE — 74176 CT ABD & PELVIS W/O CONTRAST: CPT

## 2025-07-17 PROCEDURE — 82803 BLOOD GASES ANY COMBINATION: CPT

## 2025-07-17 PROCEDURE — 84133 ASSAY OF URINE POTASSIUM: CPT

## 2025-07-17 PROCEDURE — 85610 PROTHROMBIN TIME: CPT

## 2025-07-17 PROCEDURE — 82947 ASSAY GLUCOSE BLOOD QUANT: CPT

## 2025-07-17 PROCEDURE — 83036 HEMOGLOBIN GLYCOSYLATED A1C: CPT

## 2025-07-17 PROCEDURE — 93971 EXTREMITY STUDY: CPT

## 2025-07-17 PROCEDURE — 84156 ASSAY OF PROTEIN URINE: CPT

## 2025-07-17 PROCEDURE — 80048 BASIC METABOLIC PNL TOTAL CA: CPT

## 2025-07-17 PROCEDURE — 80053 COMPREHEN METABOLIC PANEL: CPT

## 2025-07-17 PROCEDURE — 86901 BLOOD TYPING SEROLOGIC RH(D): CPT

## 2025-07-17 PROCEDURE — 50435 EXCHANGE NEPHROSTOMY CATH: CPT | Mod: RT

## 2025-07-17 PROCEDURE — 85025 COMPLETE CBC W/AUTO DIFF WBC: CPT

## 2025-07-17 PROCEDURE — C1887: CPT

## 2025-07-17 PROCEDURE — C1894: CPT

## 2025-07-17 PROCEDURE — 84100 ASSAY OF PHOSPHORUS: CPT

## 2025-07-17 PROCEDURE — 82330 ASSAY OF CALCIUM: CPT

## 2025-07-17 PROCEDURE — 85730 THROMBOPLASTIN TIME PARTIAL: CPT

## 2025-07-17 PROCEDURE — 87640 STAPH A DNA AMP PROBE: CPT

## 2025-07-17 PROCEDURE — 81001 URINALYSIS AUTO W/SCOPE: CPT

## 2025-07-17 PROCEDURE — 76776 US EXAM K TRANSPL W/DOPPLER: CPT

## 2025-07-17 PROCEDURE — 36415 COLL VENOUS BLD VENIPUNCTURE: CPT

## 2025-07-17 PROCEDURE — 87340 HEPATITIS B SURFACE AG IA: CPT

## 2025-07-17 PROCEDURE — 87086 URINE CULTURE/COLONY COUNT: CPT

## 2025-07-17 PROCEDURE — 85018 HEMOGLOBIN: CPT

## 2025-07-17 PROCEDURE — 93005 ELECTROCARDIOGRAM TRACING: CPT

## 2025-07-17 PROCEDURE — 76775 US EXAM ABDO BACK WALL LIM: CPT

## 2025-07-17 PROCEDURE — 99232 SBSQ HOSP IP/OBS MODERATE 35: CPT

## 2025-07-17 PROCEDURE — 82435 ASSAY OF BLOOD CHLORIDE: CPT

## 2025-07-17 RX ORDER — MAGNESIUM SULFATE 500 MG/ML
2 SYRINGE (ML) INJECTION ONCE
Refills: 0 | Status: COMPLETED | OUTPATIENT
Start: 2025-07-17 | End: 2025-07-17

## 2025-07-17 RX ORDER — FENTANYL CITRATE-0.9 % NACL/PF 100MCG/2ML
50 SYRINGE (ML) INTRAVENOUS
Refills: 0 | Status: DISCONTINUED | OUTPATIENT
Start: 2025-07-17 | End: 2025-07-18

## 2025-07-17 RX ORDER — FENTANYL CITRATE-0.9 % NACL/PF 100MCG/2ML
25 SYRINGE (ML) INTRAVENOUS
Refills: 0 | Status: DISCONTINUED | OUTPATIENT
Start: 2025-07-17 | End: 2025-07-18

## 2025-07-17 RX ORDER — ONDANSETRON HCL/PF 4 MG/2 ML
4 VIAL (ML) INJECTION ONCE
Refills: 0 | Status: DISCONTINUED | OUTPATIENT
Start: 2025-07-17 | End: 2025-07-18

## 2025-07-17 RX ADMIN — Medication 1 APPLICATION(S): at 05:18

## 2025-07-17 RX ADMIN — Medication 60 MILLIGRAM(S): at 05:16

## 2025-07-17 RX ADMIN — Medication 650 MILLIGRAM(S): at 21:36

## 2025-07-17 RX ADMIN — CEFTRIAXONE 100 MILLIGRAM(S): 500 INJECTION, POWDER, FOR SOLUTION INTRAMUSCULAR; INTRAVENOUS at 07:31

## 2025-07-17 RX ADMIN — Medication 2 TABLET(S): at 21:37

## 2025-07-17 RX ADMIN — ATORVASTATIN CALCIUM 10 MILLIGRAM(S): 80 TABLET, FILM COATED ORAL at 21:37

## 2025-07-17 RX ADMIN — METOPROLOL SUCCINATE 50 MILLIGRAM(S): 50 TABLET, EXTENDED RELEASE ORAL at 05:16

## 2025-07-17 RX ADMIN — Medication 650 MILLIGRAM(S): at 05:16

## 2025-07-17 RX ADMIN — INSULIN GLARGINE-YFGN 10 UNIT(S): 100 INJECTION, SOLUTION SUBCUTANEOUS at 21:47

## 2025-07-17 RX ADMIN — Medication 25 GRAM(S): at 08:21

## 2025-07-17 RX ADMIN — Medication 650 MILLIGRAM(S): at 13:10

## 2025-07-17 RX ADMIN — TAMSULOSIN HYDROCHLORIDE 0.4 MILLIGRAM(S): 0.4 CAPSULE ORAL at 21:36

## 2025-07-17 RX ADMIN — TACROLIMUS 4 MILLIGRAM(S): 0.5 CAPSULE ORAL at 08:23

## 2025-07-17 RX ADMIN — PREDNISONE 5 MILLIGRAM(S): 20 TABLET ORAL at 05:16

## 2025-07-17 NOTE — PROGRESS NOTE ADULT - PROBLEM SELECTOR PLAN 4
On rybelsus and glimepiride  - ISS, monitor FS
moderate control  - Continue nifedipine 60mg  - Continue metoprolol succinate qd
moderate control  - Continue nifedipine 60mg  - Continue metoprolol succinate qd
On rybelsus and glimepiride  - ISS, monitor FS
moderate control  - Continue nifedipine 60mg, may uptitrate to 90 mg if needed  - Continue metoprolol succinate qd
On rybelsus and glimepiride  - ISS, monitor FS - more elevated with improvement in creatinine, may need to change to moderate dose ISS vs adding standing insulin.
On rybelsus and glimepiride  - ISS, monitor FS
moderate control  - Continue nifedipine 60mg  - Continue metoprolol succinate qd
moderate control  - Continue nifedipine 60mg, may uptitrate to 90 mg if needed  - Continue metoprolol succinate qd
On rybelsus and glimepiride  - ISS, monitor FS
On rybelsus and glimepiride  - ISS, monitor FS - more elevated with improvement in creatinine, may need to change to moderate dose ISS vs adding standing insulin.
moderate control  - Continue nifedipine 60mg  - Continue metoprolol succinate qd
moderate control  - Continue nifedipine 60mg, may uptitrate to 90 mg if needed  - Continue metoprolol succinate qd
moderate control  - Continue nifedipine 60mg, may uptitrate to 90 mg if needed  - Continue metoprolol succinate qd

## 2025-07-17 NOTE — PRE-ANESTHESIA EVALUATION ADULT - NSRADCARDRESULTSFT_GEN_ALL_CORE
< from: Transthoracic Echocardiogram (12.03.20 @ 11:41) >    CONCLUSIONS:  1. Mitral annular calcification, otherwise normal mitral  valve. Minimal mitral regurgitation.  2. Calcified trileaflet aortic valve with normal opening.  Mild aortic regurgitation.  3. Normal left ventricular internal dimensions and wall  thicknesses.  4. Normal left ventricular systolic function. No segmental  wall motion abnormalities.  5. The right ventricle is not well visualized; grossly  normal right ventricular systolic function.  6. Estimated right ventricular systolic pressure equals 37  mm Hg, assuming right atrial pressure equals 10 mm Hg,  consistent with borderline pulmonary hypertension.    < end of copied text >

## 2025-07-17 NOTE — PROGRESS NOTE ADULT - PROBLEM SELECTOR PROBLEM 3
HTN (hypertension)
Renal transplant recipient
Renal transplant recipient
HTN (hypertension)
HTN (hypertension)
Renal transplant recipient
HTN (hypertension)
HTN (hypertension)
Renal transplant recipient
HTN (hypertension)
Renal transplant recipient

## 2025-07-17 NOTE — PROGRESS NOTE ADULT - PROBLEM SELECTOR PLAN 5
On rybelsus and glimepiride  - ISS  - lantus 10 qhs  - admelog 4u tid
On rybelsus and glimepiride  - ISS  - lantus 16uqhs   - admelog 4u tid
DVT ppx - heparin subq  Dispo - acute, monitoring Cr and f/u IR
On rybelsus and glimepiride  - ISS  - lantus 16uqhs   - admelog 4u tid
On rybelsus and glimepiride  - ISS  - lantus 16uqhs   - admelog 4u tid
DVT ppx - heparin subq  Dispo - acute, monitoring Cr and f/u IR    Left message for wife 7/7 kristine call back
DVT ppx - heparin subq  Dispo - IR 7/3 and monitoring Cr  Offered to update pt's family but he prefers to update himself.
DVT ppx - heparin subq  Dispo - acute, monitoring Cr and f/u IR    d/w Shahla Mack 7/8
DVT ppx - heparin subq  Dispo - acute, monitoring Cr and f/u IR    d/w Shahla Mack 7/8
On rybelsus and glimepiride  - ISS  - lantus 16uqhs   - admelog 4u tid
DVT ppx - heparin subq  Dispo - acute, monitoring Cr and f/u IR  Offered to update pt's family but he prefers to update himself.
DVT ppx - heparin subq  Dispo - acute, monitoring Cr and f/u IR  Offered to update pt's family but he prefers to update himself.
On rybelsus and glimepiride  - ISS  - lantus 16uqhs   - admelog 4u tid
DVT ppx - heparin subq  Dispo - acute, monitoring Cr and f/u IR  Offered to update pt's family but he prefers to update himself.
On rybelsus and glimepiride  - ISS  - hypoglycemic this am, decrease lantus to 10units  - admelog 4u tid
On rybelsus and glimepiride  - ISS  - lantus 16uqhs   - admelog 4u tid

## 2025-07-17 NOTE — PROGRESS NOTE ADULT - PROBLEM SELECTOR PLAN 3
- kidney transplantation Hep C+ DDRT in 7/2018 ([facility]). Transplant complicated by DGF, mild rejection treated with steroids, distal ureteral stricture requiring multiple procedures PCN, ureteral dilations, currently gets stent exchanges with Dr. Phillip.  - immunosuppression: prednisone 5mg  - tacrolimus dosing per levels  - with concern for infection - hold mmf until after stent retrieval
moderate control  - Continue nifedipine 60mg, may uptitrate to 90 mg if needed  - Continue metoprolol succinate qd
- kidney transplantation Hep C+ DDRT in 7/2018 ([facility]). Transplant complicated by DGF, mild rejection treated with steroids, distal ureteral stricture requiring multiple procedures PCN, ureteral dilations, currently gets stent exchanges with Dr. Phillip.  - immunosuppression: prednisone 5mg  - tacrolimus dosing per levels  - with concern for infection - hold mmf until after stent retrieval  - monitor tacrolimus level
moderate control  - Continue nifedipine 60mg, may uptitrate to 90 mg if needed  - Continue metoprolol succinate qd
moderate control  - Continue nifedipine 60mg, may uptitrate to 90 mg if needed  - Continue metoprolol succinate qd
- kidney transplantation Hep C+ DDRT in 7/2018 ([facility]). Transplant complicated by DGF, mild rejection treated with steroids, distal ureteral stricture requiring multiple procedures PCN, ureteral dilations, currently gets stent exchanges with Dr. Phillip.  - immunosuppression: prednisone 5mg  - tacrolimus dosing per levels  - with concern for infection - hold mmf  - monitor tacrolimus level
moderate control  - Continue nifedipine 60mg, may uptitrate to 90 mg if needed  - Continue metoprolol succinate qd
moderate control  - Continue nifedipine 60mg, may uptitrate to 90 mg if needed  - Continue metoprolol succinate qd
- kidney transplantation Hep C+ DDRT in 7/2018 ([facility]). Transplant complicated by DGF, mild rejection treated with steroids, distal ureteral stricture requiring multiple procedures PCN, ureteral dilations, currently gets stent exchanges with Dr. Phillip.  - immunosuppression: prednisone 5mg  - holding tacrolimus  - with concern for infection - hold mmf  - monitor tacrolimus level, hold dose in am
elevated  - Continue nifedipine 60mg, may uptitrate to 90 mg if needed  - Continue metoprolol succinate qd
- kidney transplantation Hep C+ DDRT in 7/2018 ([facility]). Transplant complicated by DGF, mild rejection treated with steroids, distal ureteral stricture requiring multiple procedures PCN, ureteral dilations, currently gets stent exchanges with Dr. Phillip.  - immunosuppression: prednisone 5mg  - tacrolimus dosing per levels  - with concern for infection - hold mmf until after stent retrieval
moderate control  - Continue nifedipine 60mg, may uptitrate to 90 mg if needed  - Continue metoprolol succinate qd
- kidney transplantation Hep C+ DDRT in 7/2018 ([facility]). Transplant complicated by DGF, mild rejection treated with steroids, distal ureteral stricture requiring multiple procedures PCN, ureteral dilations, currently gets stent exchanges with Dr. Phillip.  - immunosuppression: prednisone 5mg  - holding tacrolimus, pending level   - with concern for infection - hold mmf  - monitor tacrolimus level
- kidney transplantation Hep C+ DDRT in 7/2018 ([facility]). Transplant complicated by DGF, mild rejection treated with steroids, distal ureteral stricture requiring multiple procedures PCN, ureteral dilations, currently gets stent exchanges with Dr. Phillip.  - immunosuppression: prednisone 5mg  - tacrolimus dosing per levels  - with concern for infection - hold mmf  - monitor tacrolimus level
moderate control  - Continue nifedipine 60mg, may uptitrate to 90 mg if needed  - Continue metoprolol succinate qd
- kidney transplantation Hep C+ DDRT in 7/2018 ([facility]). Transplant complicated by DGF, mild rejection treated with steroids, distal ureteral stricture requiring multiple procedures PCN, ureteral dilations, currently gets stent exchanges with Dr. Phillip.  - immunosuppression: prednisone 5mg  - tacrolimus dosing per levels  - with concern for infection - hold mmf until after stent retrieval

## 2025-07-17 NOTE — PROGRESS NOTE ADULT - PROBLEM SELECTOR PROBLEM 4
HTN (hypertension)
HTN (hypertension)
T2DM (type 2 diabetes mellitus)
T2DM (type 2 diabetes mellitus)
HTN (hypertension)
T2DM (type 2 diabetes mellitus)
HTN (hypertension)
T2DM (type 2 diabetes mellitus)
T2DM (type 2 diabetes mellitus)
HTN (hypertension)
Electrolyte and fluid disorder
HTN (hypertension)
T2DM (type 2 diabetes mellitus)
HTN (hypertension)
HTN (hypertension)
T2DM (type 2 diabetes mellitus)
T2DM (type 2 diabetes mellitus)

## 2025-07-17 NOTE — PRE-ANESTHESIA EVALUATION ADULT - NSANTHPMHFT_GEN_ALL_CORE
73 yo M PMH ESRD s/p Kidney Transplant Recipient (2018) c/b hydronephrosis in transplant kidney managed with ureteral stent exchanges, HCV (from donor Kidney, treated with Epclusa), Prostate CA s/p RT and TURP, Urinary Incontinence, HTN, T2DM sent in from nephrologist' s office for CHELA found to have mild hydronephrosis with malpositioned ureteral stent for PCN +/- stent retrieval. Patient is currently s/p transplant kidney nephrostomy tube placement on 7/7 in Interventional Radiology with Dr. Kim. Now for stent removal sooner given proteus growing in urine,
73 yo M PMH ESRD s/p Kidney Transplant Recipient (2018) c/b hydronephrosis in transplant kidney managed with ureteral stent exchanges (last exchange per chart review 02/2025), HCV (from donor Kidney, treated with Epclusa), Prostate CA s/p RT and TURP, Urinary Incontinence, HTN, T2DM sent in from nephrologist for CHELA found to have mild hydronephrosis with malpositioned ureteral stent for PCN

## 2025-07-17 NOTE — PRE-ANESTHESIA EVALUATION ADULT - NSANTHOSAYNRD_GEN_A_CORE
No. FER screening performed.  STOP BANG Legend: 0-2 = LOW Risk; 3-4 = INTERMEDIATE Risk; 5-8 = HIGH Risk
No. FER screening performed.  STOP BANG Legend: 0-2 = LOW Risk; 3-4 = INTERMEDIATE Risk; 5-8 = HIGH Risk

## 2025-07-17 NOTE — PRE-ANESTHESIA EVALUATION ADULT - NSANTHAIRWAYFT_ENT_ALL_CORE
Mouth opening: >2cm  Upper lip bite: adequate  Cervical ROM: grossly intact
FROM, 2-3FB mouth opening

## 2025-07-17 NOTE — PROCEDURE NOTE - PROCEDURE FINDINGS AND DETAILS
US and fluoroscopy guided percutaneous nephrostomy tube placement in the right lower quadrant transplant kidney via an anterior calyx.
12Fr sheath placed over a wire into the RLQ transplant kidney.   Snare utilized to retrieve stent.   10Fr nephrostomy tube replaced.

## 2025-07-17 NOTE — PRE-ANESTHESIA EVALUATION ADULT - NSANTHPEFT_GEN_ALL_CORE
Gen: alert and oriented x3  Lung: clear   CV: S1 S2   Abd: soft  Ext: LUE limb alert for fistula  neuro: CN 2-12 grossly intact, no gross motor or sensory deficits appreciated  Skin: normal
A&Ox3, NAD, RRR, grossly nonfocal

## 2025-07-17 NOTE — PROGRESS NOTE ADULT - PROBLEM SELECTOR PROBLEM 5
Prophylactic measure
T2DM (type 2 diabetes mellitus)
Prophylactic measure
T2DM (type 2 diabetes mellitus)
Prophylactic measure
Prophylactic measure
T2DM (type 2 diabetes mellitus)
Prophylactic measure
T2DM (type 2 diabetes mellitus)
T2DM (type 2 diabetes mellitus)

## 2025-07-17 NOTE — PROGRESS NOTE ADULT - PROBLEM SELECTOR PLAN 6
DVT ppx - heparin subq  Dispo - acute, monitoring Cr and f/u IR  sister  wants to be point of contact [phone]
DVT ppx - heparin subq  Dispo - acute, monitoring Cr and f/u IR  sister  wants to be point of contact [phone]
DVT ppx - heparin subq  Dispo - acute, monitoring Cr and f/u IR  sister  wants to be point of contact [phone]
DVT ppx - heparin subq  Dispo - acute, monitoring Cr and f/u IR    D/w sister 7/10 wants to be point of contact [phone]
DVT ppx - heparin subq  Dispo - acute, monitoring Cr and f/u IR    D/w sister daily and 7/11 wants to be point of contact [phone]
DVT ppx - heparin subq  Dispo - acute, monitoring Cr and f/u IR  sister  wants to be point of contact [phone]
DVT ppx - heparin subq  Dispo - acute, monitoring Cr and f/u IR  sister  wants to be point of contact [phone]
DVT ppx - heparin subq  Dispo - acute, monitoring Cr and f/u IR  sister  wants to be point of contact [phone]

## 2025-07-17 NOTE — PROGRESS NOTE ADULT - PROBLEM SELECTOR PROBLEM 2
Renal transplant recipient
Pyelonephritis
Renal transplant recipient
Pyelonephritis
Renal transplant recipient
Pyelonephritis
Renal transplant recipient
Pyelonephritis
Renal transplant recipient
Renal transplant recipient
Pyelonephritis
Pyelonephritis

## 2025-07-17 NOTE — PROGRESS NOTE ADULT - SUBJECTIVE AND OBJECTIVE BOX
[facility] Division of Hospital Medicine  Josh Russo  MS Teams      SUBJECTIVE / OVERNIGHT EVENTS:  No events overnight  denies f/chills  pending stent retrieval today    ADDITIONAL REVIEW OF SYSTEMS:    MEDICATIONS  (STANDING):  atorvastatin 10 milliGRAM(s) Oral at bedtime  cefTRIAXone   IVPB 1000 milliGRAM(s) IV Intermittent every 24 hours  chlorhexidine 2% Cloths 1 Application(s) Topical daily  dextrose 5%. 1000 milliLiter(s) (100 mL/Hr) IV Continuous <Continuous>  dextrose 50% Injectable 25 Gram(s) IV Push once  dextrose 50% Injectable 12.5 Gram(s) IV Push once  dextrose 50% Injectable 25 Gram(s) IV Push once  glucagon  Injectable 1 milliGRAM(s) IntraMuscular once  insulin glargine Injectable (LANTUS) 10 Unit(s) SubCutaneous at bedtime  insulin lispro (ADMELOG) corrective regimen sliding scale   SubCutaneous three times a day before meals  insulin lispro (ADMELOG) corrective regimen sliding scale   SubCutaneous at bedtime  insulin lispro Injectable (ADMELOG) 4 Unit(s) SubCutaneous three times a day before meals  metoprolol succinate ER 50 milliGRAM(s) Oral daily  NIFEdipine XL 60 milliGRAM(s) Oral daily  petrolatum Ophthalmic Ointment 1 Application(s) Both EYES at bedtime  polyethylene glycol 3350 17 Gram(s) Oral daily  predniSONE   Tablet 5 milliGRAM(s) Oral daily  senna 2 Tablet(s) Oral at bedtime  sodium bicarbonate 650 milliGRAM(s) Oral every 8 hours  tacrolimus ER Tablet (ENVARSUS XR) 4 milliGRAM(s) Oral daily  tamsulosin 0.4 milliGRAM(s) Oral at bedtime    MEDICATIONS  (PRN):  acetaminophen     Tablet .. 650 milliGRAM(s) Oral every 6 hours PRN Mild Pain (1 - 3)  dextrose Oral Gel 15 Gram(s) Oral once PRN Blood Glucose LESS THAN 70 milliGRAM(s)/deciliter  ondansetron Injectable 4 milliGRAM(s) IV Push once PRN Nausea and/or Vomiting  sodium chloride 0.9% Bolus. 100 milliLiter(s) IV Bolus every 5 minutes PRN SBP LESS THAN or EQUAL to 90 mmHg      I&O's Summary    16 Jul 2025 07:01  -  17 Jul 2025 07:00  --------------------------------------------------------  IN: 930 mL / OUT: 1270 mL / NET: -340 mL        PHYSICAL EXAM:  Vital Signs Last 24 Hrs  T(C): 36.6 (17 Jul 2025 05:15), Max: 36.6 (16 Jul 2025 11:40)  T(F): 97.9 (17 Jul 2025 05:15), Max: 97.9 (16 Jul 2025 11:40)  HR: 76 (17 Jul 2025 05:15) (76 - 83)  BP: 164/77 (17 Jul 2025 05:15) (126/64 - 164/77)  BP(mean): --  RR: 18 (17 Jul 2025 05:15) (18 - 18)  SpO2: 99% (17 Jul 2025 05:15) (98% - 99%)    Parameters below as of 17 Jul 2025 05:15  Patient On (Oxygen Delivery Method): room air      CONSTITUTIONAL: NAD, well-developed  RESPIRATORY: Normal respiratory effort; lungs are clear to auscultation bilaterally  CARDIOVASCULAR: Regular rate and rhythm, normal S1 and S2, no murmur; No lower extremity edema  ABDOMEN: Nontender to palpation, normoactive bowel sounds  MUSCULOSKELETAL: no clubbing or cyanosis of digits; L arm >R  PSYCH: A+O to person, place, and time; affect appropriate  NEUROLOGY: CN 2-12 are intact and symmetric; no gross sensory deficits   SKIN: No rashes; no palpable lesions, PCN in place, armenta for procedure in place    LABS:                        9.4    6.16  )-----------( 184      ( 17 Jul 2025 06:18 )             30.9     07-17    140  |  105  |  61[H]  ----------------------------<  73  4.6   |  21[L]  |  4.00[H]    Ca    9.1      17 Jul 2025 06:18  Mg     1.5     07-17    TPro  5.8[L]  /  Alb  3.3  /  TBili  0.2  /  DBili  x   /  AST  22  /  ALT  15  /  AlkPhos  62  07-17    PT/INR - ( 17 Jul 2025 06:18 )   PT: 10.7 sec;   INR: 0.94 ratio         PTT - ( 17 Jul 2025 06:18 )  PTT:31.4 sec      Urinalysis Basic - ( 17 Jul 2025 06:18 )    Color: x / Appearance: x / SG: x / pH: x  Gluc: 73 mg/dL / Ketone: x  / Bili: x / Urobili: x   Blood: x / Protein: x / Nitrite: x   Leuk Esterase: x / RBC: x / WBC x   Sq Epi: x / Non Sq Epi: x / Bacteria: x

## 2025-07-17 NOTE — PROGRESS NOTE ADULT - TIME BILLING
- Ordering, reviewing, and interpreting labs, testing, and imaging.  - Independently obtaining a review of systems and performing a physical exam  - Reviewing consultant documentation/recommendations in addition to discussing plan of care with consultants.  - Counselling and educating patient and family regarding interpretation of aforementioned items and plan of care.
review of labs, imaging, notes, discussion of plan with patient
Kidney recipient admitted with CHELA on CKD, hyperkalemia, chronic transplant ureter stricture with stent.  Noted imaging showing hydronephrosis. Noted clinical, lab data.  No improvement in creatinine with Armenta catheter  Suggestions:    Low K diet  Continue out patient regimen of immunosuppression, Tacrolimus  Hold aspirin, avoid bactrim  Patient has left UE edema and has AVF, get vascular surgery f/u   Patient is agreeable for dialysis, will schedule  D/w IR prefer to do PCN after holding antiplatelet agents as per protocol and dialysis to reduce risk of bleeding. D/w house staff  Noted urology and Interventional radiology input.  Repeat ultrasound with armenta drainage  Will follow  I was present during and reviewed clinical and lab data as well as assessment and plan as documented by the house staff as noted. Please contact if any additional questions with any change in clinical condition or on availability of any additional information or reports.
face-to-face encounter, review of extensive medical records in this and prior charts, laboratory findings, radiographic and microbiology results; documentation as noted above and discussion of diagnostic impressions and plan with the patient and team
review of labs, imaging, notes, discussion of plan with patient
review of labs, imaging, notes, discussion of plan with patient
- Ordering, reviewing, and interpreting labs, testing, and imaging.  - Independently obtaining a review of systems and performing a physical exam  - Reviewing consultant documentation/recommendations in addition to discussing plan of care with consultants.  - Counselling and educating patient and family regarding interpretation of aforementioned items and plan of care.
face-to-face encounter, review of extensive medical records in this and prior charts, laboratory findings, radiographic and microbiology results; documentation as noted above and discussion of diagnostic impressions and plan with the patient and team
Kidney Transplant recipient with functioning allograft  Admitted with CHELA hyperkalemia, hydronephrosis, has ureter stent  Creatinine trend noted  Comorbidities reviewed.  Patient seen, examined and reviewed available clinical and lab data including history,  progress notes and consult notes.  Reviewed immunosuppression and allograft function including urine out put, creatinine trend, urine studies and any allograft and bladder imaging.  Reviewed medication regimen for glycemic control and blood pressure control  Suggestions:  Continue to hold aspirin in view of upcoming IR procedure  Will dialyze to optimize for procedure  Vascular surgeon f/u left UE access/edema  Recheck Tacrolimus trough level, target 4-7 ng/ml  Will follow  I was present during and reviewed clinical and lab data as well as assessment and plan as documented  . Please contact if any additional questions with any change in clinical condition or on availability of any additional information or reports.
reviewing emr, labs, exam, coordination of care, discussion with patient, documentation.
review of labs, imaging, notes, discussion of plan with patient
reviewing documentation, reviewing and interpreting labs/imaging, interviewing and examining patient, discussing plan of care with patient, documentation, coordinating care with ACP/renal/ir
reviewing emr, labs, exam, coordination of care, discussion with patient, documentation.
reviewing emr, labs, exam, coordination of care, discussion with patient, documentation.
review of labs, imaging, notes, discussion of plan with patient
reviewing emr, labs, exam, coordination of care, discussion with patient, documentation.
- Ordering, reviewing, and interpreting labs, testing, and imaging.  - Independently obtaining a review of systems and performing a physical exam  - Reviewing consultant documentation/recommendations in addition to discussing plan of care with consultants.  - Counselling and educating patient and family regarding interpretation of aforementioned items and plan of care.

## 2025-07-17 NOTE — PROGRESS NOTE ADULT - PROBLEM SELECTOR PROBLEM 1
CHELA (acute kidney injury)

## 2025-07-17 NOTE — PROGRESS NOTE ADULT - PROBLEM SELECTOR PLAN 2
kidney transplantation Hep C+ DDRT in 7/2018 ([facility]). Transplant complicated by DGF, mild rejection treated with steroids, distal ureteral stricture requiring multiple procedures PCN, ureteral dilations, currently gets stent exchanges with Dr. Phillip.  - Continue immunosuppression: tacrolimus 6mg, MMF 500mg BID, prednisone 5mg  - monitor tacrolimus level
- Pyelonephritis of right kidney (graft)   - Urine cx + Proteus mirabilis  - C/w IV ceftriaxone   - Per IR plan for stent removal 7/17  - Hold asprin x 5 days last dose 7/12   - NPO after midnight Wed 7/16
- Pyelonephritis of right kidney (graft)   - Urine cx + Proteus mirabilis  - C/w IV ceftriaxone   - Per IR plan for stent removal 7/17  - Hold asprin x 5 days last dose 7/12   - NPO after midnight Wed 7/16
kidney transplantation Hep C+ DDRT in 7/2018 ([facility]). Transplant complicated by DGF, mild rejection treated with steroids, distal ureteral stricture requiring multiple procedures PCN, ureteral dilations, currently gets stent exchanges with Dr. Phillip.  - Continue immunosuppression: tacrolimus 6mg, MMF 500mg BID, prednisone 5mg  - monitor tacrolimus level
kidney transplantation Hep C+ DDRT in 7/2018 ([facility]). Transplant complicated by DGF, mild rejection treated with steroids, distal ureteral stricture requiring multiple procedures PCN, ureteral dilations, currently gets stent exchanges with Dr. Phillip.  - immunosuppression: prednisone 5mg  holding tacrolimus  with concern for infection - hold mmf  - monitor tacrolimus level, hold dose in am
kidney transplantation Hep C+ DDRT in 7/2018 ([facility]). Transplant complicated by DGF, mild rejection treated with steroids, distal ureteral stricture requiring multiple procedures PCN, ureteral dilations, currently gets stent exchanges with Dr. Phillip.  - Continue immunosuppression: tacrolimus 6mg, MMF 500mg BID, prednisone 5mg  - monitor tacrolimus level
- Pyelonephritis of right kidney (graft)   - Urine cx + Proteus mirabilis  - C/w IV ceftriaxone   - Per IR plan for stent removal 7/17  - Hold asprin x 5 days last dose 7/12   - NPO after midnight Wed 7/16
kidney transplantation Hep C+ DDRT in 7/2018 ([facility]). Transplant complicated by DGF, mild rejection treated with steroids, distal ureteral stricture requiring multiple procedures PCN, ureteral dilations, currently gets stent exchanges with Dr. Phillip.  - Continue immunosuppression: tacrolimus 6mg, MMF 500mg BID, prednisone 5mg  - monitor tacrolimus level
kidney transplantation Hep C+ DDRT in 7/2018 ([facility]). Transplant complicated by DGF, mild rejection treated with steroids, distal ureteral stricture requiring multiple procedures PCN, ureteral dilations, currently gets stent exchanges with Dr. Phillip.  - immunosuppression: prednisone 5mg  holding tacrolimus  with concern for infection - likely need to hold mmf - renal f/u - d/w ACP  - monitor tacrolimus level, hold dose in am
- Pyelonephritis of right kidney (graft)   - Urine cx + Proteus mirabilis  - C/w IV ceftriaxone   - Per IR plan for stent removal 7/17  - Hold asprin x 5 days last dose 7/12   - NPO after midnight Wed 7/16
kidney transplantation Hep C+ DDRT in 7/2018 ([facility]). Transplant complicated by DGF, mild rejection treated with steroids, distal ureteral stricture requiring multiple procedures PCN, ureteral dilations, currently gets stent exchanges with Dr. Phillip.  - Continue immunosuppression: tacrolimus 6mg, MMF 500mg BID, prednisone 5mg  - monitor tacrolimus level, hold dose in am
kidney transplantation Hep C+ DDRT in 7/2018 ([facility]). Transplant complicated by DGF, mild rejection treated with steroids, distal ureteral stricture requiring multiple procedures PCN, ureteral dilations, currently gets stent exchanges with Dr. Phillip.  - Continue immunosuppression: tacrolimus 6mg, MMF 500mg BID, prednisone 5mg  - monitor tacrolimus level
- Pyelonephritis of right kidney (graft)   - Urine cx + Proteus mirabilis  - C/w IV ceftriaxone   - Per IR plan for stent removal 7/17  - Hold asprin x 5 days last dose 7/12   - NPO after midnight Wed 7/16
- Pyelonephritis of right kidney (graft)   - Urine cx + Proteus mirabilis  - C/w IV ceftriaxone, abx through 7/18  - Per IR plan for stent removal 7/17  - Hold asprin x 5 days last dose 7/12
- Pyelonephritis of right kidney (graft)   - Urine cx + Proteus mirabilis  - C/w IV ceftriaxone   - Per IR plan for stent removal 7/17  - Hold asprin x 5 days last dose 7/12   - NPO after midnight Wed 7/16
- Pyelonephritis of right kidney (graft)   - Urine cx + Proteus mirabilis  - C/w IV ceftriaxone   - Per IR plan for stent removal 7/17  - Hold asprin x 5 days last dose 7/12   - NPO after midnight Wed 7/16

## 2025-07-17 NOTE — PRE PROCEDURE NOTE - PRE PROCEDURE EVALUATION
Interventional Radiology    HPI: 73 yo M PMH ESRD s/p Kidney Transplant Recipient (2018) c/b hydronephrosis in transplant kidney managed with ureteral stent exchanges (last exchange per chart review 02/2025 with Dr. 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 Patient is currently s/p transplant kidney nephrostomy tube placement on 7/7 in Interventional Radiology with Dr. Kim. IR requested for stent removal sooner given proteus growing in urine, stent is serving as nidus for recurrent infection and risk of keeping patient on prolonged abx.     Allergies: No Known Allergies    Medications (Abx/Cardiac/Anticoagulation/Blood Products)    cefTRIAXone   IVPB: 100 mL/Hr IV Intermittent (07-17 @ 07:31)  metoprolol succinate ER: 50 milliGRAM(s) Oral (07-17 @ 05:16)  NIFEdipine XL: 60 milliGRAM(s) Oral (07-17 @ 05:16)    Data:  177.8  87.7  T(C): 36.8  HR: 74  BP: 127/64  RR: 18  SpO2: 98%    Exam  General: No acute distress  Chest: Non labored breathing    -WBC 6.16 / HgB 9.4 / Hct 30.9 / Plt 184  -Na 140 / Cl 105 / BUN 61 / Glucose 73  -K 4.6 / CO2 21 / Cr 4.00  -ALT 15 / Alk Phos 62 / T.Bili 0.2  -INR0.94    Imaging: Reviewed    Plan: 74y Male presents for Removal of transplant ureteral stent.  -Risks/Benefits/alternatives explained with the patient and/or healthcare proxy and witnessed informed consent obtained.

## 2025-07-18 ENCOUNTER — TRANSCRIPTION ENCOUNTER (OUTPATIENT)
Age: 75
End: 2025-07-18

## 2025-07-18 VITALS
RESPIRATION RATE: 18 BRPM | TEMPERATURE: 98 F | SYSTOLIC BLOOD PRESSURE: 144 MMHG | DIASTOLIC BLOOD PRESSURE: 78 MMHG | HEART RATE: 103 BPM | OXYGEN SATURATION: 99 %

## 2025-07-18 LAB
ANION GAP SERPL CALC-SCNC: 12 MMOL/L — SIGNIFICANT CHANGE UP (ref 5–17)
BUN SERPL-MCNC: 60 MG/DL — HIGH (ref 7–23)
CALCIUM SERPL-MCNC: 9.6 MG/DL — SIGNIFICANT CHANGE UP (ref 8.4–10.5)
CHLORIDE SERPL-SCNC: 105 MMOL/L — SIGNIFICANT CHANGE UP (ref 96–108)
CO2 SERPL-SCNC: 23 MMOL/L — SIGNIFICANT CHANGE UP (ref 22–31)
CREAT SERPL-MCNC: 3.85 MG/DL — HIGH (ref 0.5–1.3)
EGFR: 16 ML/MIN/1.73M2 — LOW
EGFR: 16 ML/MIN/1.73M2 — LOW
GLUCOSE BLDC GLUCOMTR-MCNC: 101 MG/DL — HIGH (ref 70–99)
GLUCOSE BLDC GLUCOMTR-MCNC: 90 MG/DL — SIGNIFICANT CHANGE UP (ref 70–99)
GLUCOSE SERPL-MCNC: 140 MG/DL — HIGH (ref 70–99)
HCT VFR BLD CALC: 31.7 % — LOW (ref 39–50)
HGB BLD-MCNC: 9.7 G/DL — LOW (ref 13–17)
MAGNESIUM SERPL-MCNC: 1.8 MG/DL — SIGNIFICANT CHANGE UP (ref 1.6–2.6)
MCHC RBC-ENTMCNC: 27.3 PG — SIGNIFICANT CHANGE UP (ref 27–34)
MCHC RBC-ENTMCNC: 30.6 G/DL — LOW (ref 32–36)
MCV RBC AUTO: 89.3 FL — SIGNIFICANT CHANGE UP (ref 80–100)
NRBC # BLD AUTO: 0 K/UL — SIGNIFICANT CHANGE UP (ref 0–0)
NRBC # FLD: 0 K/UL — SIGNIFICANT CHANGE UP (ref 0–0)
NRBC BLD AUTO-RTO: 0 /100 WBCS — SIGNIFICANT CHANGE UP (ref 0–0)
PLATELET # BLD AUTO: 189 K/UL — SIGNIFICANT CHANGE UP (ref 150–400)
PMV BLD: 10.8 FL — SIGNIFICANT CHANGE UP (ref 7–13)
POTASSIUM SERPL-MCNC: 4.6 MMOL/L — SIGNIFICANT CHANGE UP (ref 3.5–5.3)
POTASSIUM SERPL-SCNC: 4.6 MMOL/L — SIGNIFICANT CHANGE UP (ref 3.5–5.3)
RBC # BLD: 3.55 M/UL — LOW (ref 4.2–5.8)
RBC # FLD: 14.7 % — HIGH (ref 10.3–14.5)
SODIUM SERPL-SCNC: 140 MMOL/L — SIGNIFICANT CHANGE UP (ref 135–145)
WBC # BLD: 4.95 K/UL — SIGNIFICANT CHANGE UP (ref 3.8–10.5)
WBC # FLD AUTO: 4.95 K/UL — SIGNIFICANT CHANGE UP (ref 3.8–10.5)

## 2025-07-18 PROCEDURE — 82962 GLUCOSE BLOOD TEST: CPT

## 2025-07-18 PROCEDURE — 94640 AIRWAY INHALATION TREATMENT: CPT

## 2025-07-18 PROCEDURE — 84133 ASSAY OF URINE POTASSIUM: CPT

## 2025-07-18 PROCEDURE — 76775 US EXAM ABDO BACK WALL LIM: CPT

## 2025-07-18 PROCEDURE — 84100 ASSAY OF PHOSPHORUS: CPT

## 2025-07-18 PROCEDURE — 87077 CULTURE AEROBIC IDENTIFY: CPT

## 2025-07-18 PROCEDURE — 50435 EXCHANGE NEPHROSTOMY CATH: CPT

## 2025-07-18 PROCEDURE — 93005 ELECTROCARDIOGRAM TRACING: CPT

## 2025-07-18 PROCEDURE — 82330 ASSAY OF CALCIUM: CPT

## 2025-07-18 PROCEDURE — 84540 ASSAY OF URINE/UREA-N: CPT

## 2025-07-18 PROCEDURE — 85025 COMPLETE CBC W/AUTO DIFF WBC: CPT

## 2025-07-18 PROCEDURE — 83935 ASSAY OF URINE OSMOLALITY: CPT

## 2025-07-18 PROCEDURE — 84295 ASSAY OF SERUM SODIUM: CPT

## 2025-07-18 PROCEDURE — 87186 SC STD MICRODIL/AGAR DIL: CPT

## 2025-07-18 PROCEDURE — C1729: CPT

## 2025-07-18 PROCEDURE — 81001 URINALYSIS AUTO W/SCOPE: CPT

## 2025-07-18 PROCEDURE — 85018 HEMOGLOBIN: CPT

## 2025-07-18 PROCEDURE — 85014 HEMATOCRIT: CPT

## 2025-07-18 PROCEDURE — 87086 URINE CULTURE/COLONY COUNT: CPT

## 2025-07-18 PROCEDURE — 96374 THER/PROPH/DIAG INJ IV PUSH: CPT

## 2025-07-18 PROCEDURE — 36410 VNPNXR 3YR/> PHY/QHP DX/THER: CPT

## 2025-07-18 PROCEDURE — 84156 ASSAY OF PROTEIN URINE: CPT

## 2025-07-18 PROCEDURE — 87340 HEPATITIS B SURFACE AG IA: CPT

## 2025-07-18 PROCEDURE — C1887: CPT

## 2025-07-18 PROCEDURE — C1769: CPT

## 2025-07-18 PROCEDURE — 87640 STAPH A DNA AMP PROBE: CPT

## 2025-07-18 PROCEDURE — 82947 ASSAY GLUCOSE BLOOD QUANT: CPT

## 2025-07-18 PROCEDURE — 80197 ASSAY OF TACROLIMUS: CPT

## 2025-07-18 PROCEDURE — 82803 BLOOD GASES ANY COMBINATION: CPT

## 2025-07-18 PROCEDURE — 80053 COMPREHEN METABOLIC PANEL: CPT

## 2025-07-18 PROCEDURE — C1773: CPT

## 2025-07-18 PROCEDURE — C1894: CPT

## 2025-07-18 PROCEDURE — 86850 RBC ANTIBODY SCREEN: CPT

## 2025-07-18 PROCEDURE — 86900 BLOOD TYPING SEROLOGIC ABO: CPT

## 2025-07-18 PROCEDURE — 83036 HEMOGLOBIN GLYCOSYLATED A1C: CPT

## 2025-07-18 PROCEDURE — 74176 CT ABD & PELVIS W/O CONTRAST: CPT

## 2025-07-18 PROCEDURE — 85027 COMPLETE CBC AUTOMATED: CPT

## 2025-07-18 PROCEDURE — 99261: CPT

## 2025-07-18 PROCEDURE — 82570 ASSAY OF URINE CREATININE: CPT

## 2025-07-18 PROCEDURE — 99231 SBSQ HOSP IP/OBS SF/LOW 25: CPT

## 2025-07-18 PROCEDURE — 84132 ASSAY OF SERUM POTASSIUM: CPT

## 2025-07-18 PROCEDURE — 80048 BASIC METABOLIC PNL TOTAL CA: CPT

## 2025-07-18 PROCEDURE — 83605 ASSAY OF LACTIC ACID: CPT

## 2025-07-18 PROCEDURE — 85610 PROTHROMBIN TIME: CPT

## 2025-07-18 PROCEDURE — 99285 EMERGENCY DEPT VISIT HI MDM: CPT | Mod: 25

## 2025-07-18 PROCEDURE — 85730 THROMBOPLASTIN TIME PARTIAL: CPT

## 2025-07-18 PROCEDURE — 76776 US EXAM K TRANSPL W/DOPPLER: CPT

## 2025-07-18 PROCEDURE — 50432 PLMT NEPHROSTOMY CATHETER: CPT

## 2025-07-18 PROCEDURE — 84300 ASSAY OF URINE SODIUM: CPT

## 2025-07-18 PROCEDURE — 82435 ASSAY OF BLOOD CHLORIDE: CPT

## 2025-07-18 PROCEDURE — 99239 HOSP IP/OBS DSCHRG MGMT >30: CPT

## 2025-07-18 PROCEDURE — 86901 BLOOD TYPING SEROLOGIC RH(D): CPT

## 2025-07-18 PROCEDURE — 87641 MR-STAPH DNA AMP PROBE: CPT

## 2025-07-18 PROCEDURE — 93990 DOPPLER FLOW TESTING: CPT

## 2025-07-18 PROCEDURE — 83735 ASSAY OF MAGNESIUM: CPT

## 2025-07-18 PROCEDURE — 36415 COLL VENOUS BLD VENIPUNCTURE: CPT

## 2025-07-18 PROCEDURE — 93971 EXTREMITY STUDY: CPT

## 2025-07-18 PROCEDURE — 99232 SBSQ HOSP IP/OBS MODERATE 35: CPT | Mod: GC

## 2025-07-18 RX ORDER — SULFAMETHOXAZOLE/TRIMETHOPRIM 800-160 MG
1 TABLET ORAL
Refills: 0 | DISCHARGE

## 2025-07-18 RX ORDER — SODIUM BICARBONATE 1 MEQ/ML
1 SYRINGE (ML) INTRAVENOUS
Qty: 90 | Refills: 0
Start: 2025-07-18 | End: 2025-08-16

## 2025-07-18 RX ADMIN — Medication 1 APPLICATION(S): at 05:35

## 2025-07-18 RX ADMIN — METOPROLOL SUCCINATE 50 MILLIGRAM(S): 50 TABLET, EXTENDED RELEASE ORAL at 05:34

## 2025-07-18 RX ADMIN — CEFTRIAXONE 100 MILLIGRAM(S): 500 INJECTION, POWDER, FOR SOLUTION INTRAMUSCULAR; INTRAVENOUS at 08:35

## 2025-07-18 RX ADMIN — PREDNISONE 5 MILLIGRAM(S): 20 TABLET ORAL at 05:34

## 2025-07-18 RX ADMIN — TACROLIMUS 4 MILLIGRAM(S): 0.5 CAPSULE ORAL at 08:35

## 2025-07-18 RX ADMIN — Medication 650 MILLIGRAM(S): at 05:34

## 2025-07-18 RX ADMIN — INSULIN LISPRO 4 UNIT(S): 100 INJECTION, SOLUTION INTRAVENOUS; SUBCUTANEOUS at 09:02

## 2025-07-18 RX ADMIN — Medication 60 MILLIGRAM(S): at 05:34

## 2025-07-18 RX ADMIN — INSULIN LISPRO 4 UNIT(S): 100 INJECTION, SOLUTION INTRAVENOUS; SUBCUTANEOUS at 12:53

## 2025-07-18 NOTE — PROGRESS NOTE ADULT - PROVIDER SPECIALTY LIST ADULT
Intervent Radiology
Intervent Radiology
Transplant Nephrology
Intervent Radiology
Transplant ID
Transplant Nephrology
Vascular Surgery
Vascular Surgery
Internal Medicine
Transplant Nephrology
Intervent Radiology
Transplant ID
Hospitalist
Internal Medicine
Transplant Nephrology
Hospitalist
Internal Medicine
Hospitalist
Internal Medicine
Hospitalist

## 2025-07-18 NOTE — DISCHARGE NOTE NURSING/CASE MANAGEMENT/SOCIAL WORK - PATIENT PORTAL LINK FT
You can access the FollowMyHealth Patient Portal offered by [facility] by registering at the following website: [email or web address]. By joining [employer]’s FollowMyHealth portal, you will also be able to view your health information using other applications (apps) compatible with our system.

## 2025-07-18 NOTE — DISCHARGE NOTE NURSING/CASE MANAGEMENT/SOCIAL WORK - NSDCVIVACCINE_GEN_ALL_CORE_FT
influenza, injectable, quadrivalent, preservative free; 15-Sep-2016 13:35; Zulay Diego (RN); [employer]; [id number]; IntraMuscular; Deltoid Right.; 0.5 milliLiter(s); VIS (VIS Published: 07-Aug-2015, VIS Presented: 15-Sep-2016);   influenza, high-dose, quadrivalent; 09-Nov-2021 15:51; Anna Taylor (RN); [employer]; [id number] (Exp. Date: 30-Jun-2022); IntraMuscular; Deltoid Right.; 0.7 milliLiter(s); VIS (VIS Published: 06-Aug-2021, VIS Presented: 09-Nov-2021);

## 2025-07-18 NOTE — DISCHARGE NOTE NURSING/CASE MANAGEMENT/SOCIAL WORK - FINANCIAL ASSISTANCE
[facility] provides services at a reduced cost to those who are determined to be eligible through [facility]’s financial assistance program. Information regarding [facility]’s financial assistance program can be found by going to [email or web address] or by calling [phone].

## 2025-07-18 NOTE — PROGRESS NOTE ADULT - ASSESSMENT
75 yo M h/o HTN, T2DM, Hep C, ESRD and kidney, renal cancer, prostate cancer, kidney transplant recipient  in 2018  Sent in by nephrologist Dr. Bhakta for rise in Scr. Transplant nephrology consulted for CHELA on CKD in DDRT patient    1. s/p Hep C+ DDRT in 7/2018 ([facility]) currently admitted for CHELA on CKD  Transplant complicated by DGF, mild rejection treated with steroids, distal ureteral stricture requiring multiple procedures PCN, ureteral dilations, currently gets stent exchanges with Dr. Phillip.  Pts aaron creatinine 1.9 but had CHELA with pyelonephritis. Has had multiple episodes of CHELA. Last creatinine 3.97 in setting of likely UTI and moderate hydronephrosis on sono (4/2025). He was seen by urology and placed on flomax. Pt also has internal JJ stent being exchanged by Dr. Phillip in January 2025. Scr on admission is elevated at 5.57 on 7/1. SCr currently 5.0 s/p armenta placement and PCN placement.   UA with proteins, large Blood with RBC, and positive LE. UPCR 0.9  US Transplanted Kidney: Mild hydronephrosis with ureteral thickening, grossly unchanged from prior. Ureteral stent reidentified.  - UOP is 250cc in 24 hours.   - s/p IR placement of PCN 7/7/25. Monitor output, increasing to .950 L/24 hrs.   - s/p stent removal with IR 7/17.   - Pt. requires follow up with transplant nephrologist: Dr. Arian Bhakta in 1-2 weeks.   - Abx duration and course per ID  - Monitor labs and I/Os. Avoid nephrotoxins including, ACE/ARB, NSAIDs, contrast, etc. Dose medications as per eGFR.     2. Immunosuppression:   Simulect induction, ENV 6mg daily, target 5-7, MMF 250mg BID and pred 5.   - Dose tacro per level. continue to monitor tacro levels daily - 30 mins before the morning dose.  - Continue Envarsus 4mg daily and prednisone.   - Hold MMF for infection, will restart as outpatient.   PPX: cont mepron.     3. DM2 - on glimepiride and Rybelsus - controlled. ISS and monitor BGM  4. HTN - Cont home regimen  5. Oxalaturia - continue calcium carbonate 600mgs BID with meals, and continue sodium citrate 15 ml BID.    If you have any questions, please feel free to contact me:  Kathrine Luo MD PGY-5  Nephrology Chief Fellow  [employer] Teams (Preferred)/ Pager [phone]   (After 5pm or on weekends please page the on-call fellow)

## 2025-07-18 NOTE — PROGRESS NOTE ADULT - SUBJECTIVE AND OBJECTIVE BOX
[facility] DIVISION OF KIDNEY DISEASES AND HYPERTENSION -- FOLLOW UP NOTE  --------------------------------------------------------------------------------  Chief Complaint: s/p DDRT with CHELA    24 hour events/subjective: Pt. seen and examined at bedside earlier today. Pt. is s/p stent removal yesterday with IR. PCN still in place.  No fever, CP, SOB, LE edema, HA or dizziness during rounds today.         PAST HISTORY  --------------------------------------------------------------------------------  No significant changes to PMH, PSH, FHx, SHx, unless otherwise noted    ALLERGIES & MEDICATIONS  --------------------------------------------------------------------------------  Allergies    No Known Allergies    Intolerances      Standing Inpatient Medications  atorvastatin 10 milliGRAM(s) Oral at bedtime  cefTRIAXone   IVPB 1000 milliGRAM(s) IV Intermittent every 24 hours  chlorhexidine 2% Cloths 1 Application(s) Topical daily  dextrose 5%. 1000 milliLiter(s) IV Continuous <Continuous>  dextrose 50% Injectable 25 Gram(s) IV Push once  dextrose 50% Injectable 12.5 Gram(s) IV Push once  dextrose 50% Injectable 25 Gram(s) IV Push once  glucagon  Injectable 1 milliGRAM(s) IntraMuscular once  insulin glargine Injectable (LANTUS) 10 Unit(s) SubCutaneous at bedtime  insulin lispro (ADMELOG) corrective regimen sliding scale   SubCutaneous three times a day before meals  insulin lispro (ADMELOG) corrective regimen sliding scale   SubCutaneous at bedtime  insulin lispro Injectable (ADMELOG) 4 Unit(s) SubCutaneous three times a day before meals  metoprolol succinate ER 50 milliGRAM(s) Oral daily  NIFEdipine XL 60 milliGRAM(s) Oral daily  petrolatum Ophthalmic Ointment 1 Application(s) Both EYES at bedtime  polyethylene glycol 3350 17 Gram(s) Oral daily  predniSONE   Tablet 5 milliGRAM(s) Oral daily  senna 2 Tablet(s) Oral at bedtime  sodium bicarbonate 650 milliGRAM(s) Oral every 8 hours  tacrolimus ER Tablet (ENVARSUS XR) 4 milliGRAM(s) Oral daily  tamsulosin 0.4 milliGRAM(s) Oral at bedtime    PRN Inpatient Medications  acetaminophen     Tablet .. 650 milliGRAM(s) Oral every 6 hours PRN  dextrose Oral Gel 15 Gram(s) Oral once PRN  fentaNYL    Injectable 25 MICROGram(s) IV Push every 5 minutes PRN  fentaNYL    Injectable 50 MICROGram(s) IV Push every 10 minutes PRN  ondansetron Injectable 4 milliGRAM(s) IV Push once PRN  ondansetron Injectable 4 milliGRAM(s) IV Push once PRN  sodium chloride 0.9% Bolus. 100 milliLiter(s) IV Bolus every 5 minutes PRN      REVIEW OF SYSTEMS  --------------------------------------------------------------------------------  Gen: No fevers/chills  Respiratory: No dyspnea, cough,   CV: No chest pain, PND, orthopnea  GI: No abdominal pain, diarrhea, constipation, nausea, vomiting  Transplant: No pain  : No increased frequency, dysuria, hematuria   MSK: No edema  Neuro: No dizziness/lightheadedness    All other systems were reviewed and are negative, except as noted.    VITALS/PHYSICAL EXAM  --------------------------------------------------------------------------------  T(C): 36.6 (07-18-25 @ 04:31), Max: 36.8 (07-17-25 @ 11:19)  HR: 67 (07-18-25 @ 04:31) (61 - 74)  BP: 142/67 (07-18-25 @ 04:31) (127/64 - 158/75)  RR: 18 (07-18-25 @ 04:31) (17 - 19)  SpO2: 100% (07-18-25 @ 04:31) (97% - 100%)  Wt(kg): --  Height (cm): 177.8 (07-17-25 @ 16:48)  Weight (kg): 87.7 (07-17-25 @ 16:48)  BMI (kg/m2): 27.7 (07-17-25 @ 16:48)  BSA (m2): 2.06 (07-17-25 @ 16:48)      07-17-25 @ 07:01  -  07-18-25 @ 07:00  --------------------------------------------------------  IN: 370 mL / OUT: 1200 mL / NET: -830 mL    Physical Exam:  	Gen: NAD, able to speak in full sentences   	HEENT: PERRL, MMM   	Pulm: CTA B/L, no crackles   	CV: RRR, S1S2+  	Abd: +BS, soft          Transplant: No tenderness, swelling  	: +R PCN  	MSK: no edema   	Psych: Normal affect and mood  	Skin: Warm          Access: YOMAIRA DE DIOSF with edema +thrill    LABS/STUDIES  --------------------------------------------------------------------------------              9.7    4.95  >-----------<  189      [07-18-25 @ 07:00]              31.7     140  |  105  |  60  ----------------------------<  140      [07-18-25 @ 07:00]  4.6   |  23  |  3.85        Ca     9.6     [07-18-25 @ 07:00]      Mg     1.8     [07-18-25 @ 07:00]    TPro  5.8  /  Alb  3.3  /  TBili  0.2  /  DBili  x   /  AST  22  /  ALT  15  /  AlkPhos  62  [07-17-25 @ 06:18]    PT/INR: PT 10.7 , INR 0.94       [07-17-25 @ 06:18]  PTT: 31.4       [07-17-25 @ 06:18]      Creatinine Trend:  SCr 3.85 [07-18 @ 07:00]  SCr 4.00 [07-17 @ 06:18]  SCr 3.91 [07-16 @ 07:17]  SCr 4.13 [07-15 @ 06:52]  SCr 3.88 [07-14 @ 08:53]    Tacrolimus (), Serum: 6.0 ng/mL (07-17 @ 06:18)  Tacrolimus (), Serum: 5.1 ng/mL (07-16 @ 07:19)  Tacrolimus (), Serum: 5.8 ng/mL (07-15 @ 06:56)  Tacrolimus (), Serum: 5.1 ng/mL (07-14 @ 08:53)            Urinalysis - [07-18-25 @ 07:00]      Color  / Appearance  / SG  / pH       Gluc 140 / Ketone   / Bili  / Urobili        Blood  / Protein  / Leuk Est  / Nitrite       RBC  / WBC  / Hyaline  / Gran  / Sq Epi  / Non Sq Epi  / Bacteria         HBsAg Nonreact      [07-03-25 @ 17:35]        IMAGING/RADIOLOGY: Reviewed.

## 2025-07-18 NOTE — PROGRESS NOTE ADULT - ASSESSMENT
Interventional Radiology Follow-Up Note    This is a 74y Male s/p right transplant pcn placement on 7/10 in Interventional Radiology with Dr. Kim.     S: Patient seen and examined @ bedside. No complaints offered.     Medication:   cefTRIAXone   IVPB: ()  metoprolol succinate ER: ()  NIFEdipine XL: ()    Vitals:   T(F): 97.9, Max: 98.2 (11:19)  HR: 67  BP: 142/67  RR: 18  SpO2: 100%    Physical Exam:  General: Nontoxic, in NAD, A&O x3.  Abdomen: soft, NTND, no peritoneal signs.  Drain Device: Dressing clean, dry, intact.    24hr Drain output: 950cc; Flushed with 5cc NS without resistance, leaking or pain at the site     LABS:  WBC 4.95 / Hgb 9.7 / Hct 31.7 / Plt 189  Na 140 / K 4.6 / CO2 23 / Cl 105 / BUN 60 / Cr 3.85 / Glucose 140  ALT -- / AST -- / Alk Phos -- / Tbili --  Ptt -- / Pt -- / INR --      Assessment/Plan:  73 yo M PMH ESRD s/p Kidney Transplant Recipient () c/b hydronephrosis in transplant kidney managed with ureteral stent exchanges (last exchange per chart review 2025 with Dr. 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 Patient is currently s/p transplant kidney nephrostomy tube placement on   and stent retrieval on .     -continue global management per primary team  -monitor h/h; transfuse as needed  -trend vs/labs  -can resume asa pm dose (after 6:30pm) if no signs or symptoms of bleeding  -flush drain with 5cc NS daily forward only; DO NOT aspirate  -change dressing q3 days or when dressing is saturated  -regarding outpatient follow up with IR, if the patient is d/c home with drainage catheter they can make an appointment with IR by calling the IR booking office at [phone]; recommend IR follow in 3months for tube evaluation.  - Greater than 50% of the encounter was spent counseling and/or coordination of care on drain management and follow up.   -they will benefit from VNS service to help with drainage catheter care; they should continue same drainage catheter care as an outpatient.     Any questions or concerns regarding above please reach out to IR:   -Available on [employer] teams  -During working hours (7a-5p): call [phone]  -Emergent issues after 5pm: page: [phone]  -Non-emergent consults: Please place a [place] order "IR Consult" with an appropriate callback number  -Scheduling questions: [phone]  -Clinic/Outpatient [phone]      ANDRE Ochoa- BC  Available on teams           Interventional Radiology Follow-Up Note    This is a 74y Male s/p right transplant pcn placement on 7/10 in Interventional Radiology with Dr. Kim.     S: Patient seen and examined @ bedside. No complaints offered.     Medication:   cefTRIAXone   IVPB: ()  metoprolol succinate ER: ()  NIFEdipine XL: ()    Vitals:   T(F): 97.9, Max: 98.2 (11:19)  HR: 67  BP: 142/67  RR: 18  SpO2: 100%    Physical Exam:  General: Nontoxic, in NAD, A&O x3.  Abdomen: soft, NTND, no peritoneal signs.  Drain Device: Dressing clean, dry, intact.    24hr Drain output: 950cc; Flushed with 5cc NS without resistance, leaking or pain at the site     LABS:  WBC 4.95 / Hgb 9.7 / Hct 31.7 / Plt 189  Na 140 / K 4.6 / CO2 23 / Cl 105 / BUN 60 / Cr 3.85 / Glucose 140  ALT -- / AST -- / Alk Phos -- / Tbili --  Ptt -- / Pt -- / INR --      Assessment/Plan:  75 yo M PMH ESRD s/p Kidney Transplant Recipient () c/b hydronephrosis in transplant kidney managed with ureteral stent exchanges (last exchange per chart review 2025 with Dr. 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 Patient is currently s/p transplant kidney nephrostomy tube placement on   and stent retrieval on .     -continue global management per primary team  -monitor h/h; transfuse as needed  -trend vs/labs  -can resume asa pm dose (after 6:30pm) if no signs or symptoms of bleeding  -flush drain with 5cc NS daily forward only; DO NOT aspirate  -change dressing q3 days or when dressing is saturated  -regarding outpatient follow up with IR, pt scheduled for routine evaluation and exchange of nephrostomy tube on 10/14 @ 930am; if unable to make appointment please contact IR booking office to reschedule [phone]  - Greater than 50% of the encounter was spent counseling and/or coordination of care on drain management and follow up.   -they will benefit from VNS service to help with drainage catheter care; they should continue same drainage catheter care as an outpatient.     Any questions or concerns regarding above please reach out to IR:   -Available on [employer] teams  -During working hours (7a-5p): call [phone]  -Emergent issues after 5pm: page: [phone]  -Non-emergent consults: Please place a [place] order "IR Consult" with an appropriate callback number  -Scheduling questions: [phone]  -Clinic/Outpatient [phone]      ANDRE Ochoa- BC  Available on teams

## 2025-07-18 NOTE — DISCHARGE NOTE NURSING/CASE MANAGEMENT/SOCIAL WORK - NSDCFUADDAPPT_GEN_ALL_CORE_FT
You may call the IR booking office @ [phone] to schedule your nephrostomy tube evaluation and exchange in 3 months. Please feel free to contact us at [phone] if any problems arise. After 6PM, Monday through Friday, on weekends and on holidays, please call [phone] and ask for the radiology resident on call to be paged.

## 2025-07-18 NOTE — PROGRESS NOTE ADULT - ATTENDING COMMENTS
s/p PCN placement done by IR today   IHD today
74 year old  man with DM type II, ESRD in 2015 s/p bilateral nephrectomy for RCC   S/p DDRT in 7/2018 with aaron creatinine 1.9, chronic graft dysfunction with baseline creatinine of 3mg/dL, followed by Dr. Arian Bhakta.    History of transplant ureteric stricture treated with multiple procedures PCN, ureteral dilations, currently gets stent exchanges with Dr. Phillip, h/o BPH s/p TURP, Prostate cancer completed RT in 8/2021.      He is admitted with CHELA creatinine 3.2mg/dL (3/1/25)-> 3.97 (4/8/25)-> 5.02 (6/30/25)-> 5.57 on admission on 7/1/25. Ultrasound showed moderate hydronephrosis. HD done 7/3-7/7. PCN placed on 7/7, Urine culture from 7/7 grew proteus, treated with IV Ceftriaxone.   Stent removed yesterday, voiding urine and more UOP via PCN.     S/p DDRT 2018 with chronic graft dysfunction creatinine 3.2 in March, 4 in April and 5 in June.    CHELA in the setting of tacrolimus toxicity (trough 19), infection and obstruction HD done 7/3-7/7. PCN placed on  7/7, creatinine peaked at 6.7 on 7/7,   Serum Cr 3.8 today slightly better. Euvolemic. Electrolytes fair. No need for dialysis. Metabolic acidosis resolved discontinue sodium bicarb.     Proteus UTI 7/7 - completing  Ceftriaxone today     Ureteric stricture with chronic stent exchanges, s/p PCN, stent retrieval done on 7/17      Immunosuppression s/p Simulect induction, Home IS Env,  bid, Pred 5   Continue Envarsus 4mg daily, tac level 6.0 yesterday, target 5-7,  Continue prednisone 5mg daily   MMF on hold for infection      H/o SB resection with chronic diarrhea and oxaluria – Resume Calcium carbonate 600mg   HTN: Controlled on Nifedipine 60 daily and metoprolol ER 50mg daily   DM II on Lantus 10 and Admelog 4  HLD atorvastatin   LUE edema, AVF+, US on 7/9 no DVT     F/u with Dr. Arian Bhakta in 1-2 weeks
74 year old  man with DM type II, ESRD in 2015 s/p bilateral nephrectomy for RCC   S/p DDRT in 7/2018 with aaron creatinine 1.9, chronic graft dysfunction with baseline creatinine of 3mg/dL, followed by Dr. Arian Bhakta.    History of transplant ureteric stricture treated with multiple procedures PCN, ureteral dilations, currently gets stent exchanges with Dr. Phillip, h/o BPH s/p TURP, Prostate cancer completed RT in 8/2021.      He is admitted with CHELA creatinine 3.2mg/dL (3/1/25)-> 3.97 (4/8/25)-> 5.02 (6/30/25)-> 5.57 on admission on 7/1/25. Ultrasound showed moderate hydronephrosis. HD done 7/3-7/7. PCN placed on 7/7, Urine culture from 7/7 growing proteus. On IV ceftriaxone.      S/p DDRT 2018 with chronic graft dysfunction creatinine 3.2 in March, 4 in April and 5 in June.    CHELA in the setting of tacrolimus toxicity (trough 19), infection and obstruction HD done 7/3-7/7. PCN placed on  7/7, creatinine peaked at 6.7 on 7/7,   Serum Cr 4.1mg/dL today. Voiding urine (725ml) + also putting out urine via PCN. (1.2 liters) Euvolemic. Electrolytes fair. No need for dialysis.     Proteus UTI 7/7 on Ceftriaxone through 7/18      Ureteric stricture with chronic stent exchanges, s/p PCN, stent retrieval planned on 7/17      Immunosuppression s/p Simulect induction, Home IS Env,  bid, Pred 5   Continue Envarsus 4mg daily, target 5-7, level 5.8, Continue prednisone 5mg daily   MMF on hold for infection    Discontinue Atovaquone      H/o SB resection with chronic diarrhea and oxaluria – continue Calcium carbonate 600mg bid Sodium citrate 15ml BID      LUE edema, AVF+, US on 7/9 no DVT
Allograft function with improvement in Cr  will continue to hold off IHD   IS meds as above
4 year old Male with PMHx of  ESRD on HD via L A/V fistula (~[id number]) s/p Kidney Transplant Recipient (2018) c/b hydronephrosis in transplant kidney managed with ureteral stent exchanges (last exchange per chart review 02/2025 with Dr. 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   Found to have hydronephrosis s/p new Percutaneous nephrostomy tube placement on 7/7, urine from collecting system with high pyuria and proteus mirabilis, susceptible to ceftriaxone    Agree with treating for graft pyelonephritis- overall with 10 days of antibiotics, through 7/18   continue ceftriaxone for now, will be transitioned to orals at discharge. can use cefuroxime 500 mg daily   tentative plan to retrieve Stent on 7/17 with Dr Kim  so anticipate continueing ceftriaxone 1 g daily - if plan is to do the retrieval in outpatient setting, can transition to cefuroxime once discahrged and use cefuroxime through 7/18 or 24 hours post stent exchange, whichever comes later      Thank you for involving us in the care of this patient  Transplant ID will continue to follow  Please call or page with additional questions  Pager; #[phone]  Teams: from 8 am to 5 pm  Deann Jackson MD
4 year old Male with PMHx of  ESRD on HD via L A/V fistula (~9450-7484) s/p Kidney Transplant Recipient (2018) c/b hydronephrosis in transplant kidney managed with ureteral stent exchanges (last exchange per chart review 02/2025 with Dr. 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   Found to have hydronephrosis s/p new Percutaneous nephrostomy tube placement on 7/7, urine from collecting system with high pyuria and proteus mirabilis, susceptible to ceftriaxone    Agree with treating for graft pyelonephritis- overall with 10 days of antibiotics, through 7/18   continue ceftriaxone for now, will be transitioned to orals at discharge. can use cefuroxime 500 mg daily   tentative plan to retrieve Stent on 7/17 with Dr Kim  so anticipate continuing ceftriaxone 1 g daily - if plan is to do the retrieval in the outpatient setting, can transition to cefuroxime once discharged and use cefuroxime through 7/18 or 24 hours post stent exchange, whichever comes later      Thank you for involving us in the care of this patient  Transplant ID will sign off  Please call or page with additional questions  Pager; #[phone]  Teams: from 8 am to 5 pm  Deann Jackson MD

## 2025-07-23 NOTE — H&P PST ADULT - PROBLEM SELECTOR PROBLEM 3
[facility]   Outpatient Rehabilitation & Therapy  [address] [facility] [address]  P: [phone]   F: [phone]     Physical Therapy Evaluation    Date:  2025  Patient: Stephanie Vega  : 1961  MRN: [id number]  Physician: Saurav Catherine MD      Insurance: [facility] Medicare (AUTH AFTER EVAL, BOMN)  Secondary: [place] Medicaid (BOMN, NPRE)  Medical Diagnosis:   M75.21 (ICD-10-CM) - Biceps tendonitis, right   Z98.890 (ICD-10-CM) - Status post arthroscopy of right shoulder       Rehab Codes: M79.621- right shoulder pain, M62.81- muscle weakness, M25.61- right shoulder stiffness  Onset Date: 25                                 Next 's appt: 25    Subjective:   CC: s/p Right biceps tenotomy  HPI: Patient reports overhead reaching above 90 degrees, turning steering wheel while driving, and lifting objects brings about sharp pain in right shoulder. 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 Patient expresses she would like to get back to work pain-free. Patient states she has been able to bathe and wash hair since last week. Says prior therapy for right shoulder pain (prior to recent surgery) was not helpful but found relief with steroid shot for 6 weeks. Patient says she is taking pain medication for back pain.    Per referral:  Evaluate and treat right shoulder. Include work on right shoulder following guidelines for subacromial decompression rehab.  Modalities as needed. Teach home exercise program. 2-3 times for duration of therapist duration.    Aggravating Factors: reaching overhead, driving with right arm  Alleviating Factors: ice  Hand Dominant: [x] Right   [] Left    PMHx: [] Unremarkable [] Diabetes [] HTN  [] Pacemaker   [] MI/Heart Problems [x] Cancer [x] Arthritis [] Asthma                         [x] refer to full medical chart  In EPIC  [x] Other:  TIA x3 2003      Comorbidities:   []  FER (obstructive sleep apnea)

## 2025-07-31 ENCOUNTER — NON-APPOINTMENT (OUTPATIENT)
Age: 75
End: 2025-07-31

## 2025-07-31 ENCOUNTER — APPOINTMENT (OUTPATIENT)
Dept: VASCULAR SURGERY | Facility: CLINIC | Age: 75
End: 2025-07-31
Payer: MEDICARE

## 2025-07-31 VITALS
BODY MASS INDEX: 26.92 KG/M2 | TEMPERATURE: 208.76 F | HEIGHT: 70 IN | HEART RATE: 82 BPM | WEIGHT: 188 LBS | SYSTOLIC BLOOD PRESSURE: 122 MMHG | DIASTOLIC BLOOD PRESSURE: 63 MMHG

## 2025-07-31 DIAGNOSIS — Z93.6 OTHER ARTIFICIAL OPENINGS OF URINARY TRACT STATUS: ICD-10-CM

## 2025-07-31 DIAGNOSIS — Z94.0 KIDNEY TRANSPLANT STATUS: ICD-10-CM

## 2025-07-31 DIAGNOSIS — N18.4 CHRONIC KIDNEY DISEASE, STAGE 4 (SEVERE): ICD-10-CM

## 2025-07-31 PROCEDURE — 99214 OFFICE O/P EST MOD 30 MIN: CPT

## 2025-07-31 PROCEDURE — 93990 DOPPLER FLOW TESTING: CPT

## 2025-08-13 ENCOUNTER — INPATIENT (INPATIENT)
Facility: HOSPITAL | Age: 75
LOS: 6 days | Discharge: NURSING, REHAB OR LONG-TERM CARE | DRG: 699 | End: 2025-08-20
Attending: HOSPITALIST | Admitting: STUDENT IN AN ORGANIZED HEALTH CARE EDUCATION/TRAINING PROGRAM
Payer: MEDICARE

## 2025-08-13 VITALS
HEIGHT: 70 IN | TEMPERATURE: 98 F | OXYGEN SATURATION: 99 % | DIASTOLIC BLOOD PRESSURE: 92 MMHG | SYSTOLIC BLOOD PRESSURE: 155 MMHG | HEART RATE: 126 BPM | WEIGHT: 186.07 LBS | RESPIRATION RATE: 98 BRPM

## 2025-08-13 DIAGNOSIS — A63.0 ANOGENITAL (VENEREAL) WARTS: Chronic | ICD-10-CM

## 2025-08-13 DIAGNOSIS — N39.0 URINARY TRACT INFECTION, SITE NOT SPECIFIED: ICD-10-CM

## 2025-08-13 DIAGNOSIS — M62.82 RHABDOMYOLYSIS: ICD-10-CM

## 2025-08-13 DIAGNOSIS — I77.0 ARTERIOVENOUS FISTULA, ACQUIRED: Chronic | ICD-10-CM
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
## 2025-08-13 DIAGNOSIS — I10 ESSENTIAL (PRIMARY) HYPERTENSION: ICD-10-CM

## 2025-08-13 DIAGNOSIS — E87.5 HYPERKALEMIA: ICD-10-CM

## 2025-08-13 DIAGNOSIS — Z98.890 OTHER SPECIFIED POSTPROCEDURAL STATES: Chronic | ICD-10-CM

## 2025-08-13 DIAGNOSIS — Z96.652 PRESENCE OF LEFT ARTIFICIAL KNEE JOINT: Chronic | ICD-10-CM

## 2025-08-13 DIAGNOSIS — Z90.79 ACQUIRED ABSENCE OF OTHER GENITAL ORGAN(S): Chronic | ICD-10-CM

## 2025-08-13 DIAGNOSIS — N17.9 ACUTE KIDNEY FAILURE, UNSPECIFIED: ICD-10-CM

## 2025-08-13 DIAGNOSIS — Z94.0 KIDNEY TRANSPLANT STATUS: Chronic | ICD-10-CM

## 2025-08-13 DIAGNOSIS — Z90.5 ACQUIRED ABSENCE OF KIDNEY: Chronic | ICD-10-CM

## 2025-08-13 DIAGNOSIS — E78.5 HYPERLIPIDEMIA, UNSPECIFIED: ICD-10-CM

## 2025-08-13 DIAGNOSIS — E11.9 TYPE 2 DIABETES MELLITUS WITHOUT COMPLICATIONS: ICD-10-CM

## 2025-08-13 LAB
ADD ON TEST-SPECIMEN IN LAB: SIGNIFICANT CHANGE UP
ALBUMIN SERPL ELPH-MCNC: 4.2 G/DL — SIGNIFICANT CHANGE UP (ref 3.3–5)
ALP SERPL-CCNC: 90 U/L — SIGNIFICANT CHANGE UP (ref 40–120)
ALT FLD-CCNC: 30 U/L — SIGNIFICANT CHANGE UP (ref 10–45)
ANION GAP SERPL CALC-SCNC: 15 MMOL/L — SIGNIFICANT CHANGE UP (ref 5–17)
APPEARANCE UR: CLEAR — SIGNIFICANT CHANGE UP
APTT BLD: 31.4 SEC — SIGNIFICANT CHANGE UP (ref 26.1–36.8)
AST SERPL-CCNC: 91 U/L — HIGH (ref 10–40)
BACTERIA # UR AUTO: ABNORMAL /HPF
BASOPHILS # BLD AUTO: 0.02 K/UL — SIGNIFICANT CHANGE UP (ref 0–0.2)
BASOPHILS NFR BLD AUTO: 0.3 % — SIGNIFICANT CHANGE UP (ref 0–2)
BILIRUB SERPL-MCNC: 0.5 MG/DL — SIGNIFICANT CHANGE UP (ref 0.2–1.2)
BILIRUB UR-MCNC: NEGATIVE — SIGNIFICANT CHANGE UP
BUN SERPL-MCNC: 46 MG/DL — HIGH (ref 7–23)
CALCIUM SERPL-MCNC: 9.8 MG/DL — SIGNIFICANT CHANGE UP (ref 8.4–10.5)
CAST: 5 /LPF — HIGH (ref 0–4)
CHLORIDE SERPL-SCNC: 104 MMOL/L — SIGNIFICANT CHANGE UP (ref 96–108)
CO2 SERPL-SCNC: 24 MMOL/L — SIGNIFICANT CHANGE UP (ref 22–31)
COLOR SPEC: YELLOW — SIGNIFICANT CHANGE UP
CREAT ?TM UR-MCNC: 87 MG/DL — SIGNIFICANT CHANGE UP
CREAT SERPL-MCNC: 4.09 MG/DL — HIGH (ref 0.5–1.3)
DIFF PNL FLD: ABNORMAL
EGFR: 14 ML/MIN/1.73M2 — LOW
EGFR: 14 ML/MIN/1.73M2 — LOW
EOSINOPHIL # BLD AUTO: 0.09 K/UL — SIGNIFICANT CHANGE UP (ref 0–0.5)
EOSINOPHIL NFR BLD AUTO: 1.2 % — SIGNIFICANT CHANGE UP (ref 0–6)
GAS PNL BLDV: SIGNIFICANT CHANGE UP
GLUCOSE BLDC GLUCOMTR-MCNC: 122 MG/DL — HIGH (ref 70–99)
GLUCOSE SERPL-MCNC: 112 MG/DL — HIGH (ref 70–99)
GLUCOSE UR QL: NEGATIVE MG/DL — SIGNIFICANT CHANGE UP
HCT VFR BLD CALC: 42.4 % — SIGNIFICANT CHANGE UP (ref 39–50)
HGB BLD-MCNC: 12.7 G/DL — LOW (ref 13–17)
IMM GRANULOCYTES # BLD AUTO: 0.02 K/UL — SIGNIFICANT CHANGE UP (ref 0–0.07)
IMM GRANULOCYTES NFR BLD AUTO: 0.3 % — SIGNIFICANT CHANGE UP (ref 0–0.9)
INR BLD: 1 RATIO — SIGNIFICANT CHANGE UP (ref 0.85–1.16)
KETONES UR QL: NEGATIVE MG/DL — SIGNIFICANT CHANGE UP
LEUKOCYTE ESTERASE UR-ACNC: ABNORMAL
LYMPHOCYTES # BLD AUTO: 1.21 K/UL — SIGNIFICANT CHANGE UP (ref 1–3.3)
LYMPHOCYTES NFR BLD AUTO: 16.6 % — SIGNIFICANT CHANGE UP (ref 13–44)
MCHC RBC-ENTMCNC: 27.9 PG — SIGNIFICANT CHANGE UP (ref 27–34)
MCHC RBC-ENTMCNC: 30 G/DL — LOW (ref 32–36)
MCV RBC AUTO: 93 FL — SIGNIFICANT CHANGE UP (ref 80–100)
MONOCYTES # BLD AUTO: 0.49 K/UL — SIGNIFICANT CHANGE UP (ref 0–0.9)
MONOCYTES NFR BLD AUTO: 6.7 % — SIGNIFICANT CHANGE UP (ref 2–14)
NEUTROPHILS # BLD AUTO: 5.45 K/UL — SIGNIFICANT CHANGE UP (ref 1.8–7.4)
NEUTROPHILS NFR BLD AUTO: 74.9 % — SIGNIFICANT CHANGE UP (ref 43–77)
NITRITE UR-MCNC: POSITIVE
NRBC # BLD AUTO: 0 K/UL — SIGNIFICANT CHANGE UP (ref 0–0)
NRBC # FLD: 0 K/UL — SIGNIFICANT CHANGE UP (ref 0–0)
NRBC BLD AUTO-RTO: 0 /100 WBCS — SIGNIFICANT CHANGE UP (ref 0–0)
PH UR: 6 — SIGNIFICANT CHANGE UP (ref 5–8)
PLATELET # BLD AUTO: 147 K/UL — LOW (ref 150–400)
PMV BLD: 10.4 FL — SIGNIFICANT CHANGE UP (ref 7–13)
POTASSIUM SERPL-MCNC: 5.7 MMOL/L — HIGH (ref 3.5–5.3)
POTASSIUM SERPL-SCNC: 5.7 MMOL/L — HIGH (ref 3.5–5.3)
POTASSIUM UR-SCNC: 24 MMOL/L — SIGNIFICANT CHANGE UP
PROT ?TM UR-MCNC: 139 MG/DL — HIGH (ref 0–12)
PROT SERPL-MCNC: 7.1 G/DL — SIGNIFICANT CHANGE UP (ref 6–8.3)
PROT UR-MCNC: 300 MG/DL
PROT/CREAT UR-RTO: 1.6 RATIO — HIGH (ref 0–0.2)
PROTHROM AB SERPL-ACNC: 11.5 SEC — SIGNIFICANT CHANGE UP (ref 9.9–13.4)
RBC # BLD: 4.56 M/UL — SIGNIFICANT CHANGE UP (ref 4.2–5.8)
RBC # FLD: 18 % — HIGH (ref 10.3–14.5)
RBC CASTS # UR COMP ASSIST: 6 /HPF — HIGH (ref 0–4)
REVIEW: SIGNIFICANT CHANGE UP
SODIUM SERPL-SCNC: 143 MMOL/L — SIGNIFICANT CHANGE UP (ref 135–145)
SODIUM UR-SCNC: 99 MMOL/L — SIGNIFICANT CHANGE UP
SP GR SPEC: 1.01 — SIGNIFICANT CHANGE UP (ref 1–1.03)
SQUAMOUS # UR AUTO: 2 /HPF — SIGNIFICANT CHANGE UP (ref 0–5)
UROBILINOGEN FLD QL: 0.2 MG/DL — SIGNIFICANT CHANGE UP (ref 0.2–1)
WBC # BLD: 7.28 K/UL — SIGNIFICANT CHANGE UP (ref 3.8–10.5)
WBC # FLD AUTO: 7.28 K/UL — SIGNIFICANT CHANGE UP (ref 3.8–10.5)
WBC UR QL: 58 /HPF — HIGH (ref 0–5)

## 2025-08-13 PROCEDURE — 84156 ASSAY OF PROTEIN URINE: CPT

## 2025-08-13 PROCEDURE — 82947 ASSAY GLUCOSE BLOOD QUANT: CPT

## 2025-08-13 PROCEDURE — 71045 X-RAY EXAM CHEST 1 VIEW: CPT

## 2025-08-13 PROCEDURE — 85610 PROTHROMBIN TIME: CPT

## 2025-08-13 PROCEDURE — 70450 CT HEAD/BRAIN W/O DYE: CPT | Mod: 26

## 2025-08-13 PROCEDURE — 82330 ASSAY OF CALCIUM: CPT

## 2025-08-13 PROCEDURE — 87040 BLOOD CULTURE FOR BACTERIA: CPT

## 2025-08-13 PROCEDURE — 84132 ASSAY OF SERUM POTASSIUM: CPT

## 2025-08-13 PROCEDURE — 82803 BLOOD GASES ANY COMBINATION: CPT

## 2025-08-13 PROCEDURE — 84295 ASSAY OF SERUM SODIUM: CPT

## 2025-08-13 PROCEDURE — 82962 GLUCOSE BLOOD TEST: CPT

## 2025-08-13 PROCEDURE — 84300 ASSAY OF URINE SODIUM: CPT

## 2025-08-13 PROCEDURE — 76776 US EXAM K TRANSPL W/DOPPLER: CPT | Mod: 26,RT

## 2025-08-13 PROCEDURE — 82435 ASSAY OF BLOOD CHLORIDE: CPT

## 2025-08-13 PROCEDURE — 85014 HEMATOCRIT: CPT

## 2025-08-13 PROCEDURE — 70450 CT HEAD/BRAIN W/O DYE: CPT

## 2025-08-13 PROCEDURE — 83935 ASSAY OF URINE OSMOLALITY: CPT

## 2025-08-13 PROCEDURE — 85018 HEMOGLOBIN: CPT

## 2025-08-13 PROCEDURE — 82570 ASSAY OF URINE CREATININE: CPT

## 2025-08-13 PROCEDURE — 76776 US EXAM K TRANSPL W/DOPPLER: CPT

## 2025-08-13 PROCEDURE — 99291 CRITICAL CARE FIRST HOUR: CPT | Mod: FS

## 2025-08-13 PROCEDURE — 85730 THROMBOPLASTIN TIME PARTIAL: CPT

## 2025-08-13 PROCEDURE — 80053 COMPREHEN METABOLIC PANEL: CPT

## 2025-08-13 PROCEDURE — 93010 ELECTROCARDIOGRAM REPORT: CPT

## 2025-08-13 PROCEDURE — 71045 X-RAY EXAM CHEST 1 VIEW: CPT | Mod: 26

## 2025-08-13 PROCEDURE — 82550 ASSAY OF CK (CPK): CPT

## 2025-08-13 PROCEDURE — 84133 ASSAY OF URINE POTASSIUM: CPT

## 2025-08-13 PROCEDURE — 85025 COMPLETE CBC W/AUTO DIFF WBC: CPT

## 2025-08-13 PROCEDURE — 36415 COLL VENOUS BLD VENIPUNCTURE: CPT

## 2025-08-13 PROCEDURE — 99223 1ST HOSP IP/OBS HIGH 75: CPT

## 2025-08-13 PROCEDURE — 83605 ASSAY OF LACTIC ACID: CPT

## 2025-08-13 PROCEDURE — 81001 URINALYSIS AUTO W/SCOPE: CPT

## 2025-08-13 RX ORDER — ACETAMINOPHEN 500 MG/5ML
650 LIQUID (ML) ORAL EVERY 6 HOURS
Refills: 0 | Status: DISCONTINUED | OUTPATIENT
Start: 2025-08-13 | End: 2025-08-20

## 2025-08-13 RX ORDER — DEXTROSE 50 % IN WATER 50 %
25 SYRINGE (ML) INTRAVENOUS ONCE
Refills: 0 | Status: DISCONTINUED | OUTPATIENT
Start: 2025-08-13 | End: 2025-08-20

## 2025-08-13 RX ORDER — DEXTROSE 50 % IN WATER 50 %
12.5 SYRINGE (ML) INTRAVENOUS ONCE
Refills: 0 | Status: DISCONTINUED | OUTPATIENT
Start: 2025-08-13 | End: 2025-08-20

## 2025-08-13 RX ORDER — CEFTRIAXONE 500 MG/1
1000 INJECTION, POWDER, FOR SOLUTION INTRAMUSCULAR; INTRAVENOUS EVERY 24 HOURS
Refills: 0 | Status: DISCONTINUED | OUTPATIENT
Start: 2025-08-13 | End: 2025-08-18

## 2025-08-13 RX ORDER — NIFEDIPINE 30 MG
60 TABLET, EXTENDED RELEASE 24 HR ORAL DAILY
Refills: 0 | Status: DISCONTINUED | OUTPATIENT
Start: 2025-08-13 | End: 2025-08-19

## 2025-08-13 RX ORDER — TAMSULOSIN HYDROCHLORIDE 0.4 MG/1
1 CAPSULE ORAL
Refills: 0 | DISCHARGE

## 2025-08-13 RX ORDER — SODIUM CHLORIDE 9 G/1000ML
1000 INJECTION, SOLUTION INTRAVENOUS
Refills: 0 | Status: DISCONTINUED | OUTPATIENT
Start: 2025-08-13 | End: 2025-08-20

## 2025-08-13 RX ORDER — TAMSULOSIN HYDROCHLORIDE 0.4 MG/1
0.4 CAPSULE ORAL AT BEDTIME
Refills: 0 | Status: DISCONTINUED | OUTPATIENT
Start: 2025-08-13 | End: 2025-08-20

## 2025-08-13 RX ORDER — HEPARIN SODIUM 1000 [USP'U]/ML
5000 INJECTION INTRAVENOUS; SUBCUTANEOUS EVERY 8 HOURS
Refills: 0 | Status: DISCONTINUED | OUTPATIENT
Start: 2025-08-13 | End: 2025-08-20

## 2025-08-13 RX ORDER — ATORVASTATIN CALCIUM 80 MG/1
10 TABLET, FILM COATED ORAL AT BEDTIME
Refills: 0 | Status: DISCONTINUED | OUTPATIENT
Start: 2025-08-13 | End: 2025-08-20

## 2025-08-13 RX ORDER — ATOVAQUONE 750 MG/5ML
1500 SUSPENSION ORAL DAILY
Refills: 0 | Status: DISCONTINUED | OUTPATIENT
Start: 2025-08-13 | End: 2025-08-20

## 2025-08-13 RX ORDER — MELATONIN 5 MG
3 TABLET ORAL AT BEDTIME
Refills: 0 | Status: DISCONTINUED | OUTPATIENT
Start: 2025-08-13 | End: 2025-08-20

## 2025-08-13 RX ORDER — SODIUM ZIRCONIUM CYCLOSILICATE 5 G/5G
5 POWDER, FOR SUSPENSION ORAL ONCE
Refills: 0 | Status: COMPLETED | OUTPATIENT
Start: 2025-08-13 | End: 2025-08-13

## 2025-08-13 RX ORDER — DEXTROSE 50 % IN WATER 50 %
15 SYRINGE (ML) INTRAVENOUS ONCE
Refills: 0 | Status: DISCONTINUED | OUTPATIENT
Start: 2025-08-13 | End: 2025-08-20

## 2025-08-13 RX ORDER — CITRIC ACID/SODIUM CITRATE 300-500 MG
15 SOLUTION, ORAL ORAL THREE TIMES A DAY
Refills: 0 | Status: DISCONTINUED | OUTPATIENT
Start: 2025-08-13 | End: 2025-08-20

## 2025-08-13 RX ORDER — TACROLIMUS 0.5 MG/1
4 CAPSULE ORAL DAILY
Refills: 0 | Status: DISCONTINUED | OUTPATIENT
Start: 2025-08-13 | End: 2025-08-20

## 2025-08-13 RX ORDER — TACROLIMUS 0.5 MG/1
1 CAPSULE ORAL
Qty: 0 | Refills: 0 | DISCHARGE

## 2025-08-13 RX ORDER — ASPIRIN 325 MG
81 TABLET ORAL DAILY
Refills: 0 | Status: DISCONTINUED | OUTPATIENT
Start: 2025-08-13 | End: 2025-08-20

## 2025-08-13 RX ORDER — INSULIN LISPRO 100 U/ML
INJECTION, SOLUTION INTRAVENOUS; SUBCUTANEOUS
Refills: 0 | Status: DISCONTINUED | OUTPATIENT
Start: 2025-08-13 | End: 2025-08-20

## 2025-08-13 RX ORDER — MAGNESIUM, ALUMINUM HYDROXIDE 200-200 MG
30 TABLET,CHEWABLE ORAL EVERY 4 HOURS
Refills: 0 | Status: DISCONTINUED | OUTPATIENT
Start: 2025-08-13 | End: 2025-08-20

## 2025-08-13 RX ORDER — CALCIUM CARBONATE/VITAMIN D3 500MG-5MCG
1 TABLET ORAL DAILY
Refills: 0 | Status: DISCONTINUED | OUTPATIENT
Start: 2025-08-13 | End: 2025-08-20

## 2025-08-13 RX ORDER — METOPROLOL SUCCINATE 50 MG/1
50 TABLET, EXTENDED RELEASE ORAL DAILY
Refills: 0 | Status: DISCONTINUED | OUTPATIENT
Start: 2025-08-13 | End: 2025-08-20

## 2025-08-13 RX ORDER — ONDANSETRON HCL/PF 4 MG/2 ML
4 VIAL (ML) INJECTION EVERY 8 HOURS
Refills: 0 | Status: DISCONTINUED | OUTPATIENT
Start: 2025-08-13 | End: 2025-08-20

## 2025-08-13 RX ORDER — PREDNISONE 20 MG/1
5 TABLET ORAL DAILY
Refills: 0 | Status: DISCONTINUED | OUTPATIENT
Start: 2025-08-13 | End: 2025-08-20

## 2025-08-13 RX ORDER — SODIUM CITRATE DIHYDRATE 230 MG/1
0 TABLET, CHEWABLE ORAL
Refills: 0 | DISCHARGE

## 2025-08-13 RX ORDER — GLUCAGON 3 MG/1
1 POWDER NASAL ONCE
Refills: 0 | Status: DISCONTINUED | OUTPATIENT
Start: 2025-08-13 | End: 2025-08-20

## 2025-08-13 RX ORDER — DEXTROSE 50 % IN WATER 50 %
25 SYRINGE (ML) INTRAVENOUS ONCE
Refills: 0 | Status: COMPLETED | OUTPATIENT
Start: 2025-08-13 | End: 2025-08-13

## 2025-08-13 RX ADMIN — ATORVASTATIN CALCIUM 10 MILLIGRAM(S): 80 TABLET, FILM COATED ORAL at 22:06

## 2025-08-13 RX ADMIN — Medication 50 MILLILITER(S): at 18:52

## 2025-08-13 RX ADMIN — SODIUM ZIRCONIUM CYCLOSILICATE 5 GRAM(S): 5 POWDER, FOR SUSPENSION ORAL at 18:55

## 2025-08-13 RX ADMIN — TAMSULOSIN HYDROCHLORIDE 0.4 MILLIGRAM(S): 0.4 CAPSULE ORAL at 22:05

## 2025-08-13 RX ADMIN — SODIUM ZIRCONIUM CYCLOSILICATE 5 GRAM(S): 5 POWDER, FOR SUSPENSION ORAL at 22:06

## 2025-08-13 RX ADMIN — Medication 15 MILLILITER(S): at 23:53

## 2025-08-13 RX ADMIN — Medication 25 GRAM(S): at 18:51

## 2025-08-13 RX ADMIN — CEFTRIAXONE 100 MILLIGRAM(S): 500 INJECTION, POWDER, FOR SOLUTION INTRAMUSCULAR; INTRAVENOUS at 22:05

## 2025-08-13 RX ADMIN — HEPARIN SODIUM 5000 UNIT(S): 1000 INJECTION INTRAVENOUS; SUBCUTANEOUS at 22:07

## 2025-08-14 DIAGNOSIS — M25.561 PAIN IN RIGHT KNEE: ICD-10-CM

## 2025-08-14 DIAGNOSIS — Z94.0 KIDNEY TRANSPLANT STATUS: ICD-10-CM

## 2025-08-14 DIAGNOSIS — Z29.9 ENCOUNTER FOR PROPHYLACTIC MEASURES, UNSPECIFIED: ICD-10-CM

## 2025-08-14 LAB
24R-OH-CALCIDIOL SERPL-MCNC: 27.2 NG/ML — SIGNIFICANT CHANGE UP
A1C WITH ESTIMATED AVERAGE GLUCOSE RESULT: 5.8 % — HIGH (ref 4–5.6)
ALBUMIN SERPL ELPH-MCNC: 3.5 G/DL — SIGNIFICANT CHANGE UP (ref 3.3–5)
ALP SERPL-CCNC: 70 U/L — SIGNIFICANT CHANGE UP (ref 40–120)
ALT FLD-CCNC: 25 U/L — SIGNIFICANT CHANGE UP (ref 10–45)
ANION GAP SERPL CALC-SCNC: 11 MMOL/L — SIGNIFICANT CHANGE UP (ref 5–17)
APTT BLD: 31.1 SEC — SIGNIFICANT CHANGE UP (ref 26.1–36.8)
AST SERPL-CCNC: 65 U/L — HIGH (ref 10–40)
BASOPHILS # BLD AUTO: 0.03 K/UL — SIGNIFICANT CHANGE UP (ref 0–0.2)
BASOPHILS NFR BLD AUTO: 0.5 % — SIGNIFICANT CHANGE UP (ref 0–2)
BILIRUB SERPL-MCNC: 0.5 MG/DL — SIGNIFICANT CHANGE UP (ref 0.2–1.2)
BUN SERPL-MCNC: 42 MG/DL — HIGH (ref 7–23)
CALCIUM SERPL-MCNC: 9.1 MG/DL — SIGNIFICANT CHANGE UP (ref 8.4–10.5)
CALCIUM SERPL-MCNC: 9.2 MG/DL — SIGNIFICANT CHANGE UP (ref 8.4–10.5)
CHLORIDE SERPL-SCNC: 106 MMOL/L — SIGNIFICANT CHANGE UP (ref 96–108)
CHOLEST SERPL-MCNC: 137 MG/DL — SIGNIFICANT CHANGE UP
CK SERPL-CCNC: 1140 U/L — HIGH (ref 30–200)
CO2 SERPL-SCNC: 22 MMOL/L — SIGNIFICANT CHANGE UP (ref 22–31)
CREAT SERPL-MCNC: 3.69 MG/DL — HIGH (ref 0.5–1.3)
EGFR: 16 ML/MIN/1.73M2 — LOW
EGFR: 16 ML/MIN/1.73M2 — LOW
EOSINOPHIL # BLD AUTO: 0.15 K/UL — SIGNIFICANT CHANGE UP (ref 0–0.5)
EOSINOPHIL NFR BLD AUTO: 2.5 % — SIGNIFICANT CHANGE UP (ref 0–6)
ESTIMATED AVERAGE GLUCOSE: 120 MG/DL — HIGH (ref 68–114)
FERRITIN SERPL-MCNC: 551 NG/ML — HIGH (ref 30–400)
FOLATE SERPL-MCNC: 18.9 NG/ML — SIGNIFICANT CHANGE UP
GLUCOSE BLDC GLUCOMTR-MCNC: 112 MG/DL — HIGH (ref 70–99)
GLUCOSE BLDC GLUCOMTR-MCNC: 166 MG/DL — HIGH (ref 70–99)
GLUCOSE BLDC GLUCOMTR-MCNC: 173 MG/DL — HIGH (ref 70–99)
GLUCOSE BLDC GLUCOMTR-MCNC: 195 MG/DL — HIGH (ref 70–99)
GLUCOSE BLDC GLUCOMTR-MCNC: 48 MG/DL — CRITICAL LOW (ref 70–99)
GLUCOSE BLDC GLUCOMTR-MCNC: 52 MG/DL — CRITICAL LOW (ref 70–99)
GLUCOSE BLDC GLUCOMTR-MCNC: 96 MG/DL — SIGNIFICANT CHANGE UP (ref 70–99)
GLUCOSE SERPL-MCNC: 165 MG/DL — HIGH (ref 70–99)
HAPTOGLOB SERPL-MCNC: 98 MG/DL — SIGNIFICANT CHANGE UP (ref 34–200)
HCT VFR BLD CALC: 39.7 % — SIGNIFICANT CHANGE UP (ref 39–50)
HDLC SERPL-MCNC: 87 MG/DL — SIGNIFICANT CHANGE UP
HGB BLD-MCNC: 12.2 G/DL — LOW (ref 13–17)
IMM GRANULOCYTES # BLD AUTO: 0.02 K/UL — SIGNIFICANT CHANGE UP (ref 0–0.07)
IMM GRANULOCYTES NFR BLD AUTO: 0.3 % — SIGNIFICANT CHANGE UP (ref 0–0.9)
IMMATURE RETICULOCYTE FRACTION %: 4 % — SIGNIFICANT CHANGE UP
INR BLD: 1.01 RATIO — SIGNIFICANT CHANGE UP (ref 0.85–1.16)
IRON SATN MFR SERPL: 15 % — LOW (ref 16–55)
IRON SATN MFR SERPL: 35 UG/DL — LOW (ref 45–165)
LDH SERPL L TO P-CCNC: 300 U/L — HIGH (ref 50–242)
LDLC SERPL-MCNC: 37 MG/DL — SIGNIFICANT CHANGE UP
LIPID PNL WITH DIRECT LDL SERPL: 37 MG/DL — SIGNIFICANT CHANGE UP
LYMPHOCYTES # BLD AUTO: 1.04 K/UL — SIGNIFICANT CHANGE UP (ref 1–3.3)
LYMPHOCYTES NFR BLD AUTO: 17.1 % — SIGNIFICANT CHANGE UP (ref 13–44)
MAGNESIUM SERPL-MCNC: 1.6 MG/DL — SIGNIFICANT CHANGE UP (ref 1.6–2.6)
MCHC RBC-ENTMCNC: 28.4 PG — SIGNIFICANT CHANGE UP (ref 27–34)
MCHC RBC-ENTMCNC: 30.7 G/DL — LOW (ref 32–36)
MCV RBC AUTO: 92.3 FL — SIGNIFICANT CHANGE UP (ref 80–100)
MONOCYTES # BLD AUTO: 0.4 K/UL — SIGNIFICANT CHANGE UP (ref 0–0.9)
MONOCYTES NFR BLD AUTO: 6.6 % — SIGNIFICANT CHANGE UP (ref 2–14)
NEUTROPHILS # BLD AUTO: 4.44 K/UL — SIGNIFICANT CHANGE UP (ref 1.8–7.4)
NEUTROPHILS NFR BLD AUTO: 73 % — SIGNIFICANT CHANGE UP (ref 43–77)
NONHDLC SERPL-MCNC: 50 MG/DL — SIGNIFICANT CHANGE UP
NRBC # BLD AUTO: 0 K/UL — SIGNIFICANT CHANGE UP (ref 0–0)
NRBC # FLD: 0 K/UL — SIGNIFICANT CHANGE UP (ref 0–0)
NRBC BLD AUTO-RTO: 0 /100 WBCS — SIGNIFICANT CHANGE UP (ref 0–0)
OSMOLALITY UR: 410 MOSM/KG — SIGNIFICANT CHANGE UP (ref 50–1200)
PHOSPHATE SERPL-MCNC: 2.5 MG/DL — SIGNIFICANT CHANGE UP (ref 2.5–4.5)
PLATELET # BLD AUTO: 125 K/UL — LOW (ref 150–400)
PMV BLD: 10.8 FL — SIGNIFICANT CHANGE UP (ref 7–13)
POTASSIUM SERPL-MCNC: 5.1 MMOL/L — SIGNIFICANT CHANGE UP (ref 3.5–5.3)
POTASSIUM SERPL-SCNC: 5.1 MMOL/L — SIGNIFICANT CHANGE UP (ref 3.5–5.3)
PROT SERPL-MCNC: 6.2 G/DL — SIGNIFICANT CHANGE UP (ref 6–8.3)
PROTHROM AB SERPL-ACNC: 11.5 SEC — SIGNIFICANT CHANGE UP (ref 9.9–13.4)
PTH-INTACT FLD-MCNC: 151 PG/ML — HIGH (ref 15–65)
RBC # BLD: 4.3 M/UL — SIGNIFICANT CHANGE UP (ref 4.2–5.8)
RBC # BLD: 4.3 M/UL — SIGNIFICANT CHANGE UP (ref 4.2–5.8)
RBC # FLD: 17.8 % — HIGH (ref 10.3–14.5)
RETICS #: 61.1 K/UL — SIGNIFICANT CHANGE UP (ref 25–125)
RETICS/RBC NFR: 1.4 % — SIGNIFICANT CHANGE UP (ref 0.5–2.5)
RETICULOCYTE HEMOGLOBIN EQUIVALENT: 35.7 PG — LOW (ref 36–38.6)
SODIUM SERPL-SCNC: 139 MMOL/L — SIGNIFICANT CHANGE UP (ref 135–145)
TACROLIMUS SERPL-MCNC: 9.2 NG/ML — SIGNIFICANT CHANGE UP
TIBC SERPL-MCNC: 240 UG/DL — SIGNIFICANT CHANGE UP (ref 220–430)
TRIGL SERPL-MCNC: 62 MG/DL — SIGNIFICANT CHANGE UP
TSH SERPL-MCNC: 0.85 UIU/ML — SIGNIFICANT CHANGE UP (ref 0.27–4.2)
UIBC SERPL-MCNC: 206 UG/DL — SIGNIFICANT CHANGE UP (ref 110–370)
URATE SERPL-MCNC: 8.3 MG/DL — SIGNIFICANT CHANGE UP (ref 3.4–8.8)
UUN UR-MCNC: 461 MG/DL — SIGNIFICANT CHANGE UP
VIT B12 SERPL-MCNC: 590 PG/ML — SIGNIFICANT CHANGE UP (ref 232–1245)
WBC # BLD: 6.08 K/UL — SIGNIFICANT CHANGE UP (ref 3.8–10.5)
WBC # FLD AUTO: 6.08 K/UL — SIGNIFICANT CHANGE UP (ref 3.8–10.5)

## 2025-08-14 PROCEDURE — 76776 US EXAM K TRANSPL W/DOPPLER: CPT

## 2025-08-14 PROCEDURE — 82550 ASSAY OF CK (CPK): CPT

## 2025-08-14 PROCEDURE — 82306 VITAMIN D 25 HYDROXY: CPT

## 2025-08-14 PROCEDURE — 84156 ASSAY OF PROTEIN URINE: CPT

## 2025-08-14 PROCEDURE — 84100 ASSAY OF PHOSPHORUS: CPT

## 2025-08-14 PROCEDURE — 99233 SBSQ HOSP IP/OBS HIGH 50: CPT

## 2025-08-14 PROCEDURE — 85014 HEMATOCRIT: CPT

## 2025-08-14 PROCEDURE — 83550 IRON BINDING TEST: CPT

## 2025-08-14 PROCEDURE — 83010 ASSAY OF HAPTOGLOBIN QUANT: CPT

## 2025-08-14 PROCEDURE — 99222 1ST HOSP IP/OBS MODERATE 55: CPT | Mod: GC

## 2025-08-14 PROCEDURE — 85025 COMPLETE CBC W/AUTO DIFF WBC: CPT

## 2025-08-14 PROCEDURE — 84295 ASSAY OF SERUM SODIUM: CPT

## 2025-08-14 PROCEDURE — 74176 CT ABD & PELVIS W/O CONTRAST: CPT | Mod: 26

## 2025-08-14 PROCEDURE — 93971 EXTREMITY STUDY: CPT

## 2025-08-14 PROCEDURE — 93971 EXTREMITY STUDY: CPT | Mod: 26,LT

## 2025-08-14 PROCEDURE — 85610 PROTHROMBIN TIME: CPT

## 2025-08-14 PROCEDURE — 87040 BLOOD CULTURE FOR BACTERIA: CPT

## 2025-08-14 PROCEDURE — 84133 ASSAY OF URINE POTASSIUM: CPT

## 2025-08-14 PROCEDURE — 82310 ASSAY OF CALCIUM: CPT

## 2025-08-14 PROCEDURE — 85730 THROMBOPLASTIN TIME PARTIAL: CPT

## 2025-08-14 PROCEDURE — 82803 BLOOD GASES ANY COMBINATION: CPT

## 2025-08-14 PROCEDURE — 71045 X-RAY EXAM CHEST 1 VIEW: CPT

## 2025-08-14 PROCEDURE — 85018 HEMOGLOBIN: CPT

## 2025-08-14 PROCEDURE — 83605 ASSAY OF LACTIC ACID: CPT

## 2025-08-14 PROCEDURE — 80061 LIPID PANEL: CPT

## 2025-08-14 PROCEDURE — 36415 COLL VENOUS BLD VENIPUNCTURE: CPT

## 2025-08-14 PROCEDURE — 87086 URINE CULTURE/COLONY COUNT: CPT

## 2025-08-14 PROCEDURE — 84132 ASSAY OF SERUM POTASSIUM: CPT

## 2025-08-14 PROCEDURE — 73562 X-RAY EXAM OF KNEE 3: CPT

## 2025-08-14 PROCEDURE — 82570 ASSAY OF URINE CREATININE: CPT

## 2025-08-14 PROCEDURE — 83615 LACTATE (LD) (LDH) ENZYME: CPT

## 2025-08-14 PROCEDURE — 70450 CT HEAD/BRAIN W/O DYE: CPT

## 2025-08-14 PROCEDURE — 83036 HEMOGLOBIN GLYCOSYLATED A1C: CPT

## 2025-08-14 PROCEDURE — 74176 CT ABD & PELVIS W/O CONTRAST: CPT

## 2025-08-14 PROCEDURE — 80053 COMPREHEN METABOLIC PANEL: CPT

## 2025-08-14 PROCEDURE — 97161 PT EVAL LOW COMPLEX 20 MIN: CPT

## 2025-08-14 PROCEDURE — 82947 ASSAY GLUCOSE BLOOD QUANT: CPT

## 2025-08-14 PROCEDURE — 82746 ASSAY OF FOLIC ACID SERUM: CPT

## 2025-08-14 PROCEDURE — 84540 ASSAY OF URINE/UREA-N: CPT

## 2025-08-14 PROCEDURE — 82728 ASSAY OF FERRITIN: CPT

## 2025-08-14 PROCEDURE — 84443 ASSAY THYROID STIM HORMONE: CPT

## 2025-08-14 PROCEDURE — 84550 ASSAY OF BLOOD/URIC ACID: CPT

## 2025-08-14 PROCEDURE — 81001 URINALYSIS AUTO W/SCOPE: CPT

## 2025-08-14 PROCEDURE — 82607 VITAMIN B-12: CPT

## 2025-08-14 PROCEDURE — 83935 ASSAY OF URINE OSMOLALITY: CPT

## 2025-08-14 PROCEDURE — 84300 ASSAY OF URINE SODIUM: CPT

## 2025-08-14 PROCEDURE — 82330 ASSAY OF CALCIUM: CPT

## 2025-08-14 PROCEDURE — 83540 ASSAY OF IRON: CPT

## 2025-08-14 PROCEDURE — 83970 ASSAY OF PARATHORMONE: CPT

## 2025-08-14 PROCEDURE — 82435 ASSAY OF BLOOD CHLORIDE: CPT

## 2025-08-14 PROCEDURE — 73562 X-RAY EXAM OF KNEE 3: CPT | Mod: 26,RT

## 2025-08-14 PROCEDURE — 85045 AUTOMATED RETICULOCYTE COUNT: CPT

## 2025-08-14 PROCEDURE — 83735 ASSAY OF MAGNESIUM: CPT

## 2025-08-14 PROCEDURE — 80197 ASSAY OF TACROLIMUS: CPT

## 2025-08-14 PROCEDURE — 82962 GLUCOSE BLOOD TEST: CPT

## 2025-08-14 RX ORDER — LIDOCAINE HYDROCHLORIDE 20 MG/ML
1 JELLY TOPICAL DAILY
Refills: 0 | Status: DISCONTINUED | OUTPATIENT
Start: 2025-08-14 | End: 2025-08-20

## 2025-08-14 RX ORDER — SODIUM CHLORIDE 9 G/1000ML
1000 INJECTION, SOLUTION INTRAVENOUS
Refills: 0 | Status: DISCONTINUED | OUTPATIENT
Start: 2025-08-14 | End: 2025-08-14

## 2025-08-14 RX ORDER — ACETAMINOPHEN 500 MG/5ML
1000 LIQUID (ML) ORAL ONCE
Refills: 0 | Status: COMPLETED | OUTPATIENT
Start: 2025-08-14 | End: 2025-08-17

## 2025-08-14 RX ORDER — MAGNESIUM SULFATE 500 MG/ML
2 SYRINGE (ML) INJECTION ONCE
Refills: 0 | Status: COMPLETED | OUTPATIENT
Start: 2025-08-14 | End: 2025-08-14

## 2025-08-14 RX ADMIN — ATORVASTATIN CALCIUM 10 MILLIGRAM(S): 80 TABLET, FILM COATED ORAL at 21:31

## 2025-08-14 RX ADMIN — CEFTRIAXONE 100 MILLIGRAM(S): 500 INJECTION, POWDER, FOR SOLUTION INTRAMUSCULAR; INTRAVENOUS at 21:32

## 2025-08-14 RX ADMIN — HEPARIN SODIUM 5000 UNIT(S): 1000 INJECTION INTRAVENOUS; SUBCUTANEOUS at 06:18

## 2025-08-14 RX ADMIN — Medication 15 MILLILITER(S): at 06:17

## 2025-08-14 RX ADMIN — METOPROLOL SUCCINATE 50 MILLIGRAM(S): 50 TABLET, EXTENDED RELEASE ORAL at 06:18

## 2025-08-14 RX ADMIN — HEPARIN SODIUM 5000 UNIT(S): 1000 INJECTION INTRAVENOUS; SUBCUTANEOUS at 21:32

## 2025-08-14 RX ADMIN — Medication 15 MILLILITER(S): at 15:06

## 2025-08-14 RX ADMIN — TACROLIMUS 4 MILLIGRAM(S): 0.5 CAPSULE ORAL at 10:20

## 2025-08-14 RX ADMIN — Medication 1 TABLET(S): at 11:54

## 2025-08-14 RX ADMIN — LIDOCAINE HYDROCHLORIDE 1 PATCH: 20 JELLY TOPICAL at 15:05

## 2025-08-14 RX ADMIN — Medication 25 GRAM(S): at 15:05

## 2025-08-14 RX ADMIN — Medication 60 MILLILITER(S): at 12:39

## 2025-08-14 RX ADMIN — Medication 650 MILLIGRAM(S): at 13:08

## 2025-08-14 RX ADMIN — LIDOCAINE HYDROCHLORIDE 1 PATCH: 20 JELLY TOPICAL at 19:27

## 2025-08-14 RX ADMIN — SODIUM CHLORIDE 60 MILLILITER(S): 9 INJECTION, SOLUTION INTRAVENOUS at 10:21

## 2025-08-14 RX ADMIN — TAMSULOSIN HYDROCHLORIDE 0.4 MILLIGRAM(S): 0.4 CAPSULE ORAL at 21:31

## 2025-08-14 RX ADMIN — INSULIN LISPRO 1: 100 INJECTION, SOLUTION INTRAVENOUS; SUBCUTANEOUS at 17:24

## 2025-08-14 RX ADMIN — INSULIN LISPRO 1: 100 INJECTION, SOLUTION INTRAVENOUS; SUBCUTANEOUS at 11:53

## 2025-08-14 RX ADMIN — HEPARIN SODIUM 5000 UNIT(S): 1000 INJECTION INTRAVENOUS; SUBCUTANEOUS at 15:06

## 2025-08-14 RX ADMIN — Medication 650 MILLIGRAM(S): at 12:38

## 2025-08-14 RX ADMIN — ATOVAQUONE 1500 MILLIGRAM(S): 750 SUSPENSION ORAL at 11:54

## 2025-08-14 RX ADMIN — Medication 81 MILLIGRAM(S): at 11:54

## 2025-08-14 RX ADMIN — Medication 15 MILLILITER(S): at 21:31

## 2025-08-14 RX ADMIN — Medication 60 MILLIGRAM(S): at 06:17

## 2025-08-14 RX ADMIN — PREDNISONE 5 MILLIGRAM(S): 20 TABLET ORAL at 06:17

## 2025-08-15 LAB
ANION GAP SERPL CALC-SCNC: 14 MMOL/L — SIGNIFICANT CHANGE UP (ref 5–17)
BASOPHILS # BLD AUTO: 0.03 K/UL — SIGNIFICANT CHANGE UP (ref 0–0.2)
BASOPHILS NFR BLD AUTO: 0.5 % — SIGNIFICANT CHANGE UP (ref 0–2)
BUN SERPL-MCNC: 44 MG/DL — HIGH (ref 7–23)
CALCIUM SERPL-MCNC: 9 MG/DL — SIGNIFICANT CHANGE UP (ref 8.4–10.5)
CHLORIDE SERPL-SCNC: 104 MMOL/L — SIGNIFICANT CHANGE UP (ref 96–108)
CK SERPL-CCNC: 663 U/L — HIGH (ref 30–200)
CO2 SERPL-SCNC: 19 MMOL/L — LOW (ref 22–31)
CREAT SERPL-MCNC: 3.57 MG/DL — HIGH (ref 0.5–1.3)
EBV DNA SERPL NAA+PROBE-ACNC: SIGNIFICANT CHANGE UP IU/ML
EBVPCR LOG: SIGNIFICANT CHANGE UP LOG10IU/ML
EGFR: 17 ML/MIN/1.73M2 — LOW
EGFR: 17 ML/MIN/1.73M2 — LOW
EOSINOPHIL # BLD AUTO: 0.16 K/UL — SIGNIFICANT CHANGE UP (ref 0–0.5)
EOSINOPHIL NFR BLD AUTO: 2.8 % — SIGNIFICANT CHANGE UP (ref 0–6)
GLUCOSE BLDC GLUCOMTR-MCNC: 113 MG/DL — HIGH (ref 70–99)
GLUCOSE BLDC GLUCOMTR-MCNC: 140 MG/DL — HIGH (ref 70–99)
GLUCOSE BLDC GLUCOMTR-MCNC: 178 MG/DL — HIGH (ref 70–99)
GLUCOSE BLDC GLUCOMTR-MCNC: 193 MG/DL — HIGH (ref 70–99)
GLUCOSE SERPL-MCNC: 106 MG/DL — HIGH (ref 70–99)
HCT VFR BLD CALC: 37.7 % — LOW (ref 39–50)
HGB BLD-MCNC: 11.6 G/DL — LOW (ref 13–17)
IMM GRANULOCYTES # BLD AUTO: 0.02 K/UL — SIGNIFICANT CHANGE UP (ref 0–0.07)
IMM GRANULOCYTES NFR BLD AUTO: 0.4 % — SIGNIFICANT CHANGE UP (ref 0–0.9)
LYMPHOCYTES # BLD AUTO: 1.73 K/UL — SIGNIFICANT CHANGE UP (ref 1–3.3)
LYMPHOCYTES NFR BLD AUTO: 30.6 % — SIGNIFICANT CHANGE UP (ref 13–44)
MAGNESIUM SERPL-MCNC: 1.8 MG/DL — SIGNIFICANT CHANGE UP (ref 1.6–2.6)
MCHC RBC-ENTMCNC: 28.3 PG — SIGNIFICANT CHANGE UP (ref 27–34)
MCHC RBC-ENTMCNC: 30.8 G/DL — LOW (ref 32–36)
MCV RBC AUTO: 92 FL — SIGNIFICANT CHANGE UP (ref 80–100)
MONOCYTES # BLD AUTO: 0.59 K/UL — SIGNIFICANT CHANGE UP (ref 0–0.9)
MONOCYTES NFR BLD AUTO: 10.4 % — SIGNIFICANT CHANGE UP (ref 2–14)
NEUTROPHILS # BLD AUTO: 3.13 K/UL — SIGNIFICANT CHANGE UP (ref 1.8–7.4)
NEUTROPHILS NFR BLD AUTO: 55.3 % — SIGNIFICANT CHANGE UP (ref 43–77)
NRBC # BLD AUTO: 0 K/UL — SIGNIFICANT CHANGE UP (ref 0–0)
NRBC # FLD: 0 K/UL — SIGNIFICANT CHANGE UP (ref 0–0)
NRBC BLD AUTO-RTO: 0 /100 WBCS — SIGNIFICANT CHANGE UP (ref 0–0)
PHOSPHATE SERPL-MCNC: 2.4 MG/DL — LOW (ref 2.5–4.5)
PLATELET # BLD AUTO: 115 K/UL — LOW (ref 150–400)
PMV BLD: 10 FL — SIGNIFICANT CHANGE UP (ref 7–13)
POTASSIUM SERPL-MCNC: 4.9 MMOL/L — SIGNIFICANT CHANGE UP (ref 3.5–5.3)
POTASSIUM SERPL-SCNC: 4.9 MMOL/L — SIGNIFICANT CHANGE UP (ref 3.5–5.3)
RBC # BLD: 4.1 M/UL — LOW (ref 4.2–5.8)
RBC # FLD: 17.2 % — HIGH (ref 10.3–14.5)
SODIUM SERPL-SCNC: 137 MMOL/L — SIGNIFICANT CHANGE UP (ref 135–145)
TACROLIMUS SERPL-MCNC: 6.4 NG/ML — SIGNIFICANT CHANGE UP
WBC # BLD: 5.66 K/UL — SIGNIFICANT CHANGE UP (ref 3.8–10.5)
WBC # FLD AUTO: 5.66 K/UL — SIGNIFICANT CHANGE UP (ref 3.8–10.5)

## 2025-08-15 PROCEDURE — 73721 MRI JNT OF LWR EXTRE W/O DYE: CPT | Mod: 26,RT

## 2025-08-15 PROCEDURE — 99223 1ST HOSP IP/OBS HIGH 75: CPT

## 2025-08-15 PROCEDURE — G0545: CPT

## 2025-08-15 PROCEDURE — 99233 SBSQ HOSP IP/OBS HIGH 50: CPT

## 2025-08-15 RX ORDER — MAGNESIUM SULFATE 500 MG/ML
2 SYRINGE (ML) INJECTION ONCE
Refills: 0 | Status: COMPLETED | OUTPATIENT
Start: 2025-08-15 | End: 2025-08-15

## 2025-08-15 RX ADMIN — Medication 15 MILLILITER(S): at 22:17

## 2025-08-15 RX ADMIN — LIDOCAINE HYDROCHLORIDE 1 PATCH: 20 JELLY TOPICAL at 12:24

## 2025-08-15 RX ADMIN — ATOVAQUONE 1500 MILLIGRAM(S): 750 SUSPENSION ORAL at 20:06

## 2025-08-15 RX ADMIN — Medication 650 MILLIGRAM(S): at 22:48

## 2025-08-15 RX ADMIN — Medication 1 TABLET(S): at 12:25

## 2025-08-15 RX ADMIN — Medication 650 MILLIGRAM(S): at 22:32

## 2025-08-15 RX ADMIN — Medication 15 MILLILITER(S): at 05:31

## 2025-08-15 RX ADMIN — PREDNISONE 5 MILLIGRAM(S): 20 TABLET ORAL at 05:27

## 2025-08-15 RX ADMIN — Medication 650 MILLIGRAM(S): at 06:03

## 2025-08-15 RX ADMIN — ATORVASTATIN CALCIUM 10 MILLIGRAM(S): 80 TABLET, FILM COATED ORAL at 22:14

## 2025-08-15 RX ADMIN — Medication 650 MILLIGRAM(S): at 05:26

## 2025-08-15 RX ADMIN — Medication 81 MILLIGRAM(S): at 12:25

## 2025-08-15 RX ADMIN — LIDOCAINE HYDROCHLORIDE 1 PATCH: 20 JELLY TOPICAL at 03:30

## 2025-08-15 RX ADMIN — METOPROLOL SUCCINATE 50 MILLIGRAM(S): 50 TABLET, EXTENDED RELEASE ORAL at 05:27

## 2025-08-15 RX ADMIN — TAMSULOSIN HYDROCHLORIDE 0.4 MILLIGRAM(S): 0.4 CAPSULE ORAL at 22:15

## 2025-08-15 RX ADMIN — Medication 25 GRAM(S): at 09:00

## 2025-08-15 RX ADMIN — HEPARIN SODIUM 5000 UNIT(S): 1000 INJECTION INTRAVENOUS; SUBCUTANEOUS at 22:17

## 2025-08-15 RX ADMIN — Medication 60 MILLIGRAM(S): at 05:28

## 2025-08-15 RX ADMIN — HEPARIN SODIUM 5000 UNIT(S): 1000 INJECTION INTRAVENOUS; SUBCUTANEOUS at 05:26

## 2025-08-15 RX ADMIN — TACROLIMUS 4 MILLIGRAM(S): 0.5 CAPSULE ORAL at 09:00

## 2025-08-15 RX ADMIN — INSULIN LISPRO 1: 100 INJECTION, SOLUTION INTRAVENOUS; SUBCUTANEOUS at 17:21

## 2025-08-15 RX ADMIN — CEFTRIAXONE 100 MILLIGRAM(S): 500 INJECTION, POWDER, FOR SOLUTION INTRAMUSCULAR; INTRAVENOUS at 22:33

## 2025-08-15 RX ADMIN — LIDOCAINE HYDROCHLORIDE 1 PATCH: 20 JELLY TOPICAL at 19:35

## 2025-08-16 DIAGNOSIS — T14.8XXA OTHER INJURY OF UNSPECIFIED BODY REGION, INITIAL ENCOUNTER: ICD-10-CM

## 2025-08-16 LAB
ANION GAP SERPL CALC-SCNC: 12 MMOL/L — SIGNIFICANT CHANGE UP (ref 5–17)
BUN SERPL-MCNC: 44 MG/DL — HIGH (ref 7–23)
CALCIUM SERPL-MCNC: 9.3 MG/DL — SIGNIFICANT CHANGE UP (ref 8.4–10.5)
CHLORIDE SERPL-SCNC: 102 MMOL/L — SIGNIFICANT CHANGE UP (ref 96–108)
CK SERPL-CCNC: 369 U/L — HIGH (ref 30–200)
CO2 SERPL-SCNC: 22 MMOL/L — SIGNIFICANT CHANGE UP (ref 22–31)
CREAT SERPL-MCNC: 3.55 MG/DL — HIGH (ref 0.5–1.3)
CRP SERPL-MCNC: 118 MG/L — HIGH (ref 0–4)
EGFR: 17 ML/MIN/1.73M2 — LOW
EGFR: 17 ML/MIN/1.73M2 — LOW
ERYTHROCYTE [SEDIMENTATION RATE] IN BLOOD: 51 MM/HR — HIGH (ref 0–15)
GLUCOSE BLDC GLUCOMTR-MCNC: 110 MG/DL — HIGH (ref 70–99)
GLUCOSE BLDC GLUCOMTR-MCNC: 142 MG/DL — HIGH (ref 70–99)
GLUCOSE BLDC GLUCOMTR-MCNC: 156 MG/DL — HIGH (ref 70–99)
GLUCOSE BLDC GLUCOMTR-MCNC: 178 MG/DL — HIGH (ref 70–99)
GLUCOSE SERPL-MCNC: 121 MG/DL — HIGH (ref 70–99)
HCT VFR BLD CALC: 38.8 % — LOW (ref 39–50)
HGB BLD-MCNC: 11.9 G/DL — LOW (ref 13–17)
MAGNESIUM SERPL-MCNC: 2.1 MG/DL — SIGNIFICANT CHANGE UP (ref 1.6–2.6)
MCHC RBC-ENTMCNC: 27.9 PG — SIGNIFICANT CHANGE UP (ref 27–34)
MCHC RBC-ENTMCNC: 30.7 G/DL — LOW (ref 32–36)
MCV RBC AUTO: 91.1 FL — SIGNIFICANT CHANGE UP (ref 80–100)
NRBC # BLD AUTO: 0 K/UL — SIGNIFICANT CHANGE UP (ref 0–0)
NRBC # FLD: 0 K/UL — SIGNIFICANT CHANGE UP (ref 0–0)
NRBC BLD AUTO-RTO: 0 /100 WBCS — SIGNIFICANT CHANGE UP (ref 0–0)
PHOSPHATE SERPL-MCNC: 2.6 MG/DL — SIGNIFICANT CHANGE UP (ref 2.5–4.5)
PLATELET # BLD AUTO: 125 K/UL — LOW (ref 150–400)
PMV BLD: 9.9 FL — SIGNIFICANT CHANGE UP (ref 7–13)
POTASSIUM SERPL-MCNC: 4.6 MMOL/L — SIGNIFICANT CHANGE UP (ref 3.5–5.3)
POTASSIUM SERPL-SCNC: 4.6 MMOL/L — SIGNIFICANT CHANGE UP (ref 3.5–5.3)
RBC # BLD: 4.26 M/UL — SIGNIFICANT CHANGE UP (ref 4.2–5.8)
RBC # FLD: 17 % — HIGH (ref 10.3–14.5)
SODIUM SERPL-SCNC: 136 MMOL/L — SIGNIFICANT CHANGE UP (ref 135–145)
TACROLIMUS SERPL-MCNC: 11.2 NG/ML — SIGNIFICANT CHANGE UP
URATE SERPL-MCNC: 7.9 MG/DL — SIGNIFICANT CHANGE UP (ref 3.4–8.8)
URATE SERPL-MCNC: 8 MG/DL — SIGNIFICANT CHANGE UP (ref 3.4–8.8)
WBC # BLD: 4.87 K/UL — SIGNIFICANT CHANGE UP (ref 3.8–10.5)
WBC # FLD AUTO: 4.87 K/UL — SIGNIFICANT CHANGE UP (ref 3.8–10.5)

## 2025-08-16 PROCEDURE — 99233 SBSQ HOSP IP/OBS HIGH 50: CPT

## 2025-08-16 PROCEDURE — 93990 DOPPLER FLOW TESTING: CPT | Mod: 26

## 2025-08-16 RX ADMIN — Medication 15 MILLILITER(S): at 05:01

## 2025-08-16 RX ADMIN — ATORVASTATIN CALCIUM 10 MILLIGRAM(S): 80 TABLET, FILM COATED ORAL at 21:34

## 2025-08-16 RX ADMIN — TAMSULOSIN HYDROCHLORIDE 0.4 MILLIGRAM(S): 0.4 CAPSULE ORAL at 21:35

## 2025-08-16 RX ADMIN — Medication 81 MILLIGRAM(S): at 11:07

## 2025-08-16 RX ADMIN — HEPARIN SODIUM 5000 UNIT(S): 1000 INJECTION INTRAVENOUS; SUBCUTANEOUS at 21:35

## 2025-08-16 RX ADMIN — Medication 650 MILLIGRAM(S): at 05:00

## 2025-08-16 RX ADMIN — LIDOCAINE HYDROCHLORIDE 1 PATCH: 20 JELLY TOPICAL at 00:09

## 2025-08-16 RX ADMIN — Medication 650 MILLIGRAM(S): at 07:21

## 2025-08-16 RX ADMIN — HEPARIN SODIUM 5000 UNIT(S): 1000 INJECTION INTRAVENOUS; SUBCUTANEOUS at 05:01

## 2025-08-16 RX ADMIN — PREDNISONE 5 MILLIGRAM(S): 20 TABLET ORAL at 05:00

## 2025-08-16 RX ADMIN — ATOVAQUONE 1500 MILLIGRAM(S): 750 SUSPENSION ORAL at 11:08

## 2025-08-16 RX ADMIN — TACROLIMUS 4 MILLIGRAM(S): 0.5 CAPSULE ORAL at 08:48

## 2025-08-16 RX ADMIN — Medication 1 TABLET(S): at 11:07

## 2025-08-16 RX ADMIN — CEFTRIAXONE 100 MILLIGRAM(S): 500 INJECTION, POWDER, FOR SOLUTION INTRAMUSCULAR; INTRAVENOUS at 21:34

## 2025-08-16 RX ADMIN — Medication 60 MILLILITER(S): at 05:03

## 2025-08-16 RX ADMIN — INSULIN LISPRO 1: 100 INJECTION, SOLUTION INTRAVENOUS; SUBCUTANEOUS at 12:33

## 2025-08-16 RX ADMIN — METOPROLOL SUCCINATE 50 MILLIGRAM(S): 50 TABLET, EXTENDED RELEASE ORAL at 05:00

## 2025-08-16 RX ADMIN — HEPARIN SODIUM 5000 UNIT(S): 1000 INJECTION INTRAVENOUS; SUBCUTANEOUS at 13:06

## 2025-08-16 RX ADMIN — Medication 60 MILLIGRAM(S): at 05:00

## 2025-08-16 RX ADMIN — Medication 15 MILLILITER(S): at 13:06

## 2025-08-16 RX ADMIN — Medication 15 MILLILITER(S): at 21:35

## 2025-08-17 LAB
ANION GAP SERPL CALC-SCNC: 12 MMOL/L — SIGNIFICANT CHANGE UP (ref 5–17)
BUN SERPL-MCNC: 42 MG/DL — HIGH (ref 7–23)
CALCIUM SERPL-MCNC: 9.1 MG/DL — SIGNIFICANT CHANGE UP (ref 8.4–10.5)
CHLORIDE SERPL-SCNC: 104 MMOL/L — SIGNIFICANT CHANGE UP (ref 96–108)
CK SERPL-CCNC: 226 U/L — HIGH (ref 30–200)
CO2 SERPL-SCNC: 20 MMOL/L — LOW (ref 22–31)
CREAT SERPL-MCNC: 3.34 MG/DL — HIGH (ref 0.5–1.3)
EGFR: 18 ML/MIN/1.73M2 — LOW
EGFR: 18 ML/MIN/1.73M2 — LOW
FLUAV AG NPH QL: SIGNIFICANT CHANGE UP
FLUBV AG NPH QL: SIGNIFICANT CHANGE UP
GLUCOSE BLDC GLUCOMTR-MCNC: 119 MG/DL — HIGH (ref 70–99)
GLUCOSE BLDC GLUCOMTR-MCNC: 159 MG/DL — HIGH (ref 70–99)
GLUCOSE BLDC GLUCOMTR-MCNC: 191 MG/DL — HIGH (ref 70–99)
GLUCOSE BLDC GLUCOMTR-MCNC: 89 MG/DL — SIGNIFICANT CHANGE UP (ref 70–99)
GLUCOSE SERPL-MCNC: 168 MG/DL — HIGH (ref 70–99)
HCT VFR BLD CALC: 37.9 % — LOW (ref 39–50)
HGB BLD-MCNC: 11.9 G/DL — LOW (ref 13–17)
MCHC RBC-ENTMCNC: 28.7 PG — SIGNIFICANT CHANGE UP (ref 27–34)
MCHC RBC-ENTMCNC: 31.4 G/DL — LOW (ref 32–36)
MCV RBC AUTO: 91.3 FL — SIGNIFICANT CHANGE UP (ref 80–100)
NRBC # BLD AUTO: 0 K/UL — SIGNIFICANT CHANGE UP (ref 0–0)
NRBC # FLD: 0 K/UL — SIGNIFICANT CHANGE UP (ref 0–0)
NRBC BLD AUTO-RTO: 0 /100 WBCS — SIGNIFICANT CHANGE UP (ref 0–0)
PLATELET # BLD AUTO: 133 K/UL — LOW (ref 150–400)
PMV BLD: 11 FL — SIGNIFICANT CHANGE UP (ref 7–13)
POTASSIUM SERPL-MCNC: 4.8 MMOL/L — SIGNIFICANT CHANGE UP (ref 3.5–5.3)
POTASSIUM SERPL-SCNC: 4.8 MMOL/L — SIGNIFICANT CHANGE UP (ref 3.5–5.3)
RBC # BLD: 4.15 M/UL — LOW (ref 4.2–5.8)
RBC # FLD: 16.5 % — HIGH (ref 10.3–14.5)
RSV RNA NPH QL NAA+NON-PROBE: SIGNIFICANT CHANGE UP
SARS-COV-2 RNA SPEC QL NAA+PROBE: SIGNIFICANT CHANGE UP
SODIUM SERPL-SCNC: 136 MMOL/L — SIGNIFICANT CHANGE UP (ref 135–145)
SOURCE RESPIRATORY: SIGNIFICANT CHANGE UP
TACROLIMUS SERPL-MCNC: 5.4 NG/ML — SIGNIFICANT CHANGE UP
WBC # BLD: 4.06 K/UL — SIGNIFICANT CHANGE UP (ref 3.8–10.5)
WBC # FLD AUTO: 4.06 K/UL — SIGNIFICANT CHANGE UP (ref 3.8–10.5)

## 2025-08-17 PROCEDURE — G0545: CPT

## 2025-08-17 PROCEDURE — 99233 SBSQ HOSP IP/OBS HIGH 50: CPT

## 2025-08-17 PROCEDURE — 99232 SBSQ HOSP IP/OBS MODERATE 35: CPT

## 2025-08-17 RX ADMIN — Medication 60 MILLILITER(S): at 11:32

## 2025-08-17 RX ADMIN — TAMSULOSIN HYDROCHLORIDE 0.4 MILLIGRAM(S): 0.4 CAPSULE ORAL at 21:46

## 2025-08-17 RX ADMIN — Medication 81 MILLIGRAM(S): at 11:33

## 2025-08-17 RX ADMIN — Medication 1 TABLET(S): at 11:33

## 2025-08-17 RX ADMIN — ATOVAQUONE 1500 MILLIGRAM(S): 750 SUSPENSION ORAL at 11:32

## 2025-08-17 RX ADMIN — HEPARIN SODIUM 5000 UNIT(S): 1000 INJECTION INTRAVENOUS; SUBCUTANEOUS at 21:46

## 2025-08-17 RX ADMIN — ATORVASTATIN CALCIUM 10 MILLIGRAM(S): 80 TABLET, FILM COATED ORAL at 21:46

## 2025-08-17 RX ADMIN — Medication 15 MILLILITER(S): at 13:36

## 2025-08-17 RX ADMIN — Medication 650 MILLIGRAM(S): at 05:29

## 2025-08-17 RX ADMIN — Medication 15 MILLILITER(S): at 05:30

## 2025-08-17 RX ADMIN — Medication 1000 MILLIGRAM(S): at 10:30

## 2025-08-17 RX ADMIN — INSULIN LISPRO 1: 100 INJECTION, SOLUTION INTRAVENOUS; SUBCUTANEOUS at 11:31

## 2025-08-17 RX ADMIN — PREDNISONE 5 MILLIGRAM(S): 20 TABLET ORAL at 05:29

## 2025-08-17 RX ADMIN — METOPROLOL SUCCINATE 50 MILLIGRAM(S): 50 TABLET, EXTENDED RELEASE ORAL at 05:29

## 2025-08-17 RX ADMIN — Medication 400 MILLIGRAM(S): at 09:47

## 2025-08-17 RX ADMIN — CEFTRIAXONE 100 MILLIGRAM(S): 500 INJECTION, POWDER, FOR SOLUTION INTRAMUSCULAR; INTRAVENOUS at 21:43

## 2025-08-17 RX ADMIN — Medication 650 MILLIGRAM(S): at 06:55

## 2025-08-17 RX ADMIN — Medication 60 MILLIGRAM(S): at 05:29

## 2025-08-17 RX ADMIN — TACROLIMUS 4 MILLIGRAM(S): 0.5 CAPSULE ORAL at 08:55

## 2025-08-17 RX ADMIN — HEPARIN SODIUM 5000 UNIT(S): 1000 INJECTION INTRAVENOUS; SUBCUTANEOUS at 05:30

## 2025-08-17 RX ADMIN — HEPARIN SODIUM 5000 UNIT(S): 1000 INJECTION INTRAVENOUS; SUBCUTANEOUS at 13:35

## 2025-08-17 RX ADMIN — Medication 60 MILLILITER(S): at 05:32

## 2025-08-17 RX ADMIN — Medication 15 MILLILITER(S): at 21:46

## 2025-08-18 LAB
-  AMPICILLIN/SULBACTAM: SIGNIFICANT CHANGE UP
-  AMPICILLIN: SIGNIFICANT CHANGE UP
-  AZTREONAM: SIGNIFICANT CHANGE UP
-  CEFAZOLIN: SIGNIFICANT CHANGE UP
-  CEFEPIME: SIGNIFICANT CHANGE UP
-  CEFTRIAXONE: SIGNIFICANT CHANGE UP
-  CEFUROXIME: SIGNIFICANT CHANGE UP
-  CIPROFLOXACIN: SIGNIFICANT CHANGE UP
-  ERTAPENEM: SIGNIFICANT CHANGE UP
-  GENTAMICIN: SIGNIFICANT CHANGE UP
-  IMIPENEM: SIGNIFICANT CHANGE UP
-  LEVOFLOXACIN: SIGNIFICANT CHANGE UP
-  MEROPENEM: SIGNIFICANT CHANGE UP
-  NITROFURANTOIN: SIGNIFICANT CHANGE UP
-  PIPERACILLIN/TAZOBACTAM: SIGNIFICANT CHANGE UP
-  TIGECYCLINE: SIGNIFICANT CHANGE UP
-  TOBRAMYCIN: SIGNIFICANT CHANGE UP
-  TRIMETHOPRIM/SULFAMETHOXAZOLE: SIGNIFICANT CHANGE UP
ANION GAP SERPL CALC-SCNC: 12 MMOL/L — SIGNIFICANT CHANGE UP (ref 5–17)
BUN SERPL-MCNC: 41 MG/DL — HIGH (ref 7–23)
CALCIUM SERPL-MCNC: 9 MG/DL — SIGNIFICANT CHANGE UP (ref 8.4–10.5)
CHLORIDE SERPL-SCNC: 103 MMOL/L — SIGNIFICANT CHANGE UP (ref 96–108)
CO2 SERPL-SCNC: 21 MMOL/L — LOW (ref 22–31)
CREAT SERPL-MCNC: 3.27 MG/DL — HIGH (ref 0.5–1.3)
CULTURE RESULTS: ABNORMAL
CULTURE RESULTS: SIGNIFICANT CHANGE UP
CULTURE RESULTS: SIGNIFICANT CHANGE UP
EGFR: 19 ML/MIN/1.73M2 — LOW
EGFR: 19 ML/MIN/1.73M2 — LOW
GLUCOSE BLDC GLUCOMTR-MCNC: 107 MG/DL — HIGH (ref 70–99)
GLUCOSE BLDC GLUCOMTR-MCNC: 154 MG/DL — HIGH (ref 70–99)
GLUCOSE BLDC GLUCOMTR-MCNC: 58 MG/DL — LOW (ref 70–99)
GLUCOSE BLDC GLUCOMTR-MCNC: 61 MG/DL — LOW (ref 70–99)
GLUCOSE BLDC GLUCOMTR-MCNC: 66 MG/DL — LOW (ref 70–99)
GLUCOSE BLDC GLUCOMTR-MCNC: 66 MG/DL — LOW (ref 70–99)
GLUCOSE BLDC GLUCOMTR-MCNC: 74 MG/DL — SIGNIFICANT CHANGE UP (ref 70–99)
GLUCOSE BLDC GLUCOMTR-MCNC: 87 MG/DL — SIGNIFICANT CHANGE UP (ref 70–99)
GLUCOSE BLDC GLUCOMTR-MCNC: 95 MG/DL — SIGNIFICANT CHANGE UP (ref 70–99)
GLUCOSE SERPL-MCNC: 190 MG/DL — HIGH (ref 70–99)
HCT VFR BLD CALC: 37.2 % — LOW (ref 39–50)
HGB BLD-MCNC: 11.8 G/DL — LOW (ref 13–17)
MCHC RBC-ENTMCNC: 28.6 PG — SIGNIFICANT CHANGE UP (ref 27–34)
MCHC RBC-ENTMCNC: 31.7 G/DL — LOW (ref 32–36)
MCV RBC AUTO: 90.3 FL — SIGNIFICANT CHANGE UP (ref 80–100)
METHOD TYPE: SIGNIFICANT CHANGE UP
NRBC # BLD AUTO: 0 K/UL — SIGNIFICANT CHANGE UP (ref 0–0)
NRBC # FLD: 0 K/UL — SIGNIFICANT CHANGE UP (ref 0–0)
NRBC BLD AUTO-RTO: 0 /100 WBCS — SIGNIFICANT CHANGE UP (ref 0–0)
ORGANISM # SPEC MICROSCOPIC CNT: ABNORMAL
ORGANISM # SPEC MICROSCOPIC CNT: ABNORMAL
PLATELET # BLD AUTO: 140 K/UL — LOW (ref 150–400)
PMV BLD: 9.4 FL — SIGNIFICANT CHANGE UP (ref 7–13)
POTASSIUM SERPL-MCNC: 5 MMOL/L — SIGNIFICANT CHANGE UP (ref 3.5–5.3)
POTASSIUM SERPL-SCNC: 5 MMOL/L — SIGNIFICANT CHANGE UP (ref 3.5–5.3)
RBC # BLD: 4.12 M/UL — LOW (ref 4.2–5.8)
RBC # FLD: 16.2 % — HIGH (ref 10.3–14.5)
SODIUM SERPL-SCNC: 136 MMOL/L — SIGNIFICANT CHANGE UP (ref 135–145)
SPECIMEN SOURCE: SIGNIFICANT CHANGE UP
WBC # BLD: 4.31 K/UL — SIGNIFICANT CHANGE UP (ref 3.8–10.5)
WBC # FLD AUTO: 4.31 K/UL — SIGNIFICANT CHANGE UP (ref 3.8–10.5)

## 2025-08-18 PROCEDURE — 82947 ASSAY GLUCOSE BLOOD QUANT: CPT

## 2025-08-18 PROCEDURE — 85652 RBC SED RATE AUTOMATED: CPT

## 2025-08-18 PROCEDURE — 87799 DETECT AGENT NOS DNA QUANT: CPT

## 2025-08-18 PROCEDURE — 82306 VITAMIN D 25 HYDROXY: CPT

## 2025-08-18 PROCEDURE — 86140 C-REACTIVE PROTEIN: CPT

## 2025-08-18 PROCEDURE — 87186 SC STD MICRODIL/AGAR DIL: CPT

## 2025-08-18 PROCEDURE — 84132 ASSAY OF SERUM POTASSIUM: CPT

## 2025-08-18 PROCEDURE — 82330 ASSAY OF CALCIUM: CPT

## 2025-08-18 PROCEDURE — 84540 ASSAY OF URINE/UREA-N: CPT

## 2025-08-18 PROCEDURE — 82435 ASSAY OF BLOOD CHLORIDE: CPT

## 2025-08-18 PROCEDURE — 97110 THERAPEUTIC EXERCISES: CPT

## 2025-08-18 PROCEDURE — 82728 ASSAY OF FERRITIN: CPT

## 2025-08-18 PROCEDURE — 82962 GLUCOSE BLOOD TEST: CPT

## 2025-08-18 PROCEDURE — 84100 ASSAY OF PHOSPHORUS: CPT

## 2025-08-18 PROCEDURE — 82746 ASSAY OF FOLIC ACID SERUM: CPT

## 2025-08-18 PROCEDURE — 83550 IRON BINDING TEST: CPT

## 2025-08-18 PROCEDURE — 84133 ASSAY OF URINE POTASSIUM: CPT

## 2025-08-18 PROCEDURE — 83615 LACTATE (LD) (LDH) ENZYME: CPT

## 2025-08-18 PROCEDURE — 85018 HEMOGLOBIN: CPT

## 2025-08-18 PROCEDURE — 97116 GAIT TRAINING THERAPY: CPT

## 2025-08-18 PROCEDURE — 82803 BLOOD GASES ANY COMBINATION: CPT

## 2025-08-18 PROCEDURE — 83540 ASSAY OF IRON: CPT

## 2025-08-18 PROCEDURE — 74176 CT ABD & PELVIS W/O CONTRAST: CPT

## 2025-08-18 PROCEDURE — 85045 AUTOMATED RETICULOCYTE COUNT: CPT

## 2025-08-18 PROCEDURE — 80048 BASIC METABOLIC PNL TOTAL CA: CPT

## 2025-08-18 PROCEDURE — 85610 PROTHROMBIN TIME: CPT

## 2025-08-18 PROCEDURE — 87086 URINE CULTURE/COLONY COUNT: CPT

## 2025-08-18 PROCEDURE — 73721 MRI JNT OF LWR EXTRE W/O DYE: CPT

## 2025-08-18 PROCEDURE — 76776 US EXAM K TRANSPL W/DOPPLER: CPT

## 2025-08-18 PROCEDURE — 85014 HEMATOCRIT: CPT

## 2025-08-18 PROCEDURE — 80061 LIPID PANEL: CPT

## 2025-08-18 PROCEDURE — 93990 DOPPLER FLOW TESTING: CPT

## 2025-08-18 PROCEDURE — 83010 ASSAY OF HAPTOGLOBIN QUANT: CPT

## 2025-08-18 PROCEDURE — 84550 ASSAY OF BLOOD/URIC ACID: CPT

## 2025-08-18 PROCEDURE — 99232 SBSQ HOSP IP/OBS MODERATE 35: CPT

## 2025-08-18 PROCEDURE — 83735 ASSAY OF MAGNESIUM: CPT

## 2025-08-18 PROCEDURE — 83935 ASSAY OF URINE OSMOLALITY: CPT

## 2025-08-18 PROCEDURE — 85027 COMPLETE CBC AUTOMATED: CPT

## 2025-08-18 PROCEDURE — 70450 CT HEAD/BRAIN W/O DYE: CPT

## 2025-08-18 PROCEDURE — 84300 ASSAY OF URINE SODIUM: CPT

## 2025-08-18 PROCEDURE — 71045 X-RAY EXAM CHEST 1 VIEW: CPT

## 2025-08-18 PROCEDURE — 83605 ASSAY OF LACTIC ACID: CPT

## 2025-08-18 PROCEDURE — 82607 VITAMIN B-12: CPT

## 2025-08-18 PROCEDURE — 84443 ASSAY THYROID STIM HORMONE: CPT

## 2025-08-18 PROCEDURE — 80197 ASSAY OF TACROLIMUS: CPT

## 2025-08-18 PROCEDURE — 84156 ASSAY OF PROTEIN URINE: CPT

## 2025-08-18 PROCEDURE — 97161 PT EVAL LOW COMPLEX 20 MIN: CPT

## 2025-08-18 PROCEDURE — 85730 THROMBOPLASTIN TIME PARTIAL: CPT

## 2025-08-18 PROCEDURE — 93971 EXTREMITY STUDY: CPT

## 2025-08-18 PROCEDURE — 36415 COLL VENOUS BLD VENIPUNCTURE: CPT

## 2025-08-18 PROCEDURE — 87040 BLOOD CULTURE FOR BACTERIA: CPT

## 2025-08-18 PROCEDURE — 82550 ASSAY OF CK (CPK): CPT

## 2025-08-18 PROCEDURE — 82570 ASSAY OF URINE CREATININE: CPT

## 2025-08-18 PROCEDURE — 99232 SBSQ HOSP IP/OBS MODERATE 35: CPT | Mod: GC

## 2025-08-18 PROCEDURE — 93005 ELECTROCARDIOGRAM TRACING: CPT

## 2025-08-18 PROCEDURE — 73562 X-RAY EXAM OF KNEE 3: CPT

## 2025-08-18 PROCEDURE — 84295 ASSAY OF SERUM SODIUM: CPT

## 2025-08-18 PROCEDURE — 80053 COMPREHEN METABOLIC PANEL: CPT

## 2025-08-18 PROCEDURE — 83036 HEMOGLOBIN GLYCOSYLATED A1C: CPT

## 2025-08-18 PROCEDURE — 83970 ASSAY OF PARATHORMONE: CPT

## 2025-08-18 PROCEDURE — 82310 ASSAY OF CALCIUM: CPT

## 2025-08-18 PROCEDURE — 85025 COMPLETE CBC W/AUTO DIFF WBC: CPT

## 2025-08-18 PROCEDURE — 87637 SARSCOV2&INF A&B&RSV AMP PRB: CPT

## 2025-08-18 PROCEDURE — 81001 URINALYSIS AUTO W/SCOPE: CPT

## 2025-08-18 RX ORDER — DEXTROSE 50 % IN WATER 50 %
15 SYRINGE (ML) INTRAVENOUS ONCE
Refills: 0 | Status: COMPLETED | OUTPATIENT
Start: 2025-08-18 | End: 2025-08-18

## 2025-08-18 RX ORDER — ERTAPENEM SODIUM 1 G/1
500 INJECTION, POWDER, LYOPHILIZED, FOR SOLUTION INTRAMUSCULAR; INTRAVENOUS EVERY 24 HOURS
Refills: 0 | Status: DISCONTINUED | OUTPATIENT
Start: 2025-08-19 | End: 2025-08-20

## 2025-08-18 RX ORDER — ACETAMINOPHEN 500 MG/5ML
1000 LIQUID (ML) ORAL ONCE
Refills: 0 | Status: DISCONTINUED | OUTPATIENT
Start: 2025-08-18 | End: 2025-08-18

## 2025-08-18 RX ORDER — ERTAPENEM SODIUM 1 G/1
500 INJECTION, POWDER, LYOPHILIZED, FOR SOLUTION INTRAMUSCULAR; INTRAVENOUS ONCE
Refills: 0 | Status: COMPLETED | OUTPATIENT
Start: 2025-08-18 | End: 2025-08-18

## 2025-08-18 RX ORDER — ERTAPENEM SODIUM 1 G/1
INJECTION, POWDER, LYOPHILIZED, FOR SOLUTION INTRAMUSCULAR; INTRAVENOUS
Refills: 0 | Status: DISCONTINUED | OUTPATIENT
Start: 2025-08-19 | End: 2025-08-20

## 2025-08-18 RX ORDER — ACETAMINOPHEN 500 MG/5ML
1000 LIQUID (ML) ORAL ONCE
Refills: 0 | Status: COMPLETED | OUTPATIENT
Start: 2025-08-18 | End: 2025-08-18

## 2025-08-18 RX ADMIN — Medication 15 MILLILITER(S): at 06:20

## 2025-08-18 RX ADMIN — HEPARIN SODIUM 5000 UNIT(S): 1000 INJECTION INTRAVENOUS; SUBCUTANEOUS at 21:17

## 2025-08-18 RX ADMIN — Medication 400 MILLIGRAM(S): at 09:40

## 2025-08-18 RX ADMIN — Medication 81 MILLIGRAM(S): at 12:37

## 2025-08-18 RX ADMIN — LIDOCAINE HYDROCHLORIDE 1 PATCH: 20 JELLY TOPICAL at 12:43

## 2025-08-18 RX ADMIN — Medication 60 MILLIGRAM(S): at 06:19

## 2025-08-18 RX ADMIN — INSULIN LISPRO 1: 100 INJECTION, SOLUTION INTRAVENOUS; SUBCUTANEOUS at 12:36

## 2025-08-18 RX ADMIN — Medication 15 GRAM(S): at 21:47

## 2025-08-18 RX ADMIN — Medication 15 GRAM(S): at 21:16

## 2025-08-18 RX ADMIN — TAMSULOSIN HYDROCHLORIDE 0.4 MILLIGRAM(S): 0.4 CAPSULE ORAL at 21:17

## 2025-08-18 RX ADMIN — Medication 1000 MILLIGRAM(S): at 10:42

## 2025-08-18 RX ADMIN — TACROLIMUS 4 MILLIGRAM(S): 0.5 CAPSULE ORAL at 13:56

## 2025-08-18 RX ADMIN — Medication 15 MILLILITER(S): at 21:17

## 2025-08-18 RX ADMIN — Medication 1 TABLET(S): at 12:37

## 2025-08-18 RX ADMIN — HEPARIN SODIUM 5000 UNIT(S): 1000 INJECTION INTRAVENOUS; SUBCUTANEOUS at 06:19

## 2025-08-18 RX ADMIN — Medication 15 GRAM(S): at 22:56

## 2025-08-18 RX ADMIN — Medication 15 MILLILITER(S): at 14:31

## 2025-08-18 RX ADMIN — LIDOCAINE HYDROCHLORIDE 1 PATCH: 20 JELLY TOPICAL at 19:12

## 2025-08-18 RX ADMIN — METOPROLOL SUCCINATE 50 MILLIGRAM(S): 50 TABLET, EXTENDED RELEASE ORAL at 06:19

## 2025-08-18 RX ADMIN — ATORVASTATIN CALCIUM 10 MILLIGRAM(S): 80 TABLET, FILM COATED ORAL at 21:17

## 2025-08-18 RX ADMIN — Medication 400 MILLIGRAM(S): at 00:19

## 2025-08-18 RX ADMIN — PREDNISONE 5 MILLIGRAM(S): 20 TABLET ORAL at 06:19

## 2025-08-18 RX ADMIN — HEPARIN SODIUM 5000 UNIT(S): 1000 INJECTION INTRAVENOUS; SUBCUTANEOUS at 14:35

## 2025-08-18 RX ADMIN — Medication 1000 MILLIGRAM(S): at 01:00

## 2025-08-18 RX ADMIN — ATOVAQUONE 1500 MILLIGRAM(S): 750 SUSPENSION ORAL at 12:37

## 2025-08-19 LAB
ANION GAP SERPL CALC-SCNC: 14 MMOL/L — SIGNIFICANT CHANGE UP (ref 5–17)
BUN SERPL-MCNC: 44 MG/DL — HIGH (ref 7–23)
CALCIUM SERPL-MCNC: 9.2 MG/DL — SIGNIFICANT CHANGE UP (ref 8.4–10.5)
CHLORIDE SERPL-SCNC: 104 MMOL/L — SIGNIFICANT CHANGE UP (ref 96–108)
CMV DNA CSF QL NAA+PROBE: SIGNIFICANT CHANGE UP IU/ML
CMV DNA SPEC NAA+PROBE-LOG#: SIGNIFICANT CHANGE UP LOG10IU/ML
CO2 SERPL-SCNC: 22 MMOL/L — SIGNIFICANT CHANGE UP (ref 22–31)
CREAT SERPL-MCNC: 3.45 MG/DL — HIGH (ref 0.5–1.3)
EGFR: 18 ML/MIN/1.73M2 — LOW
EGFR: 18 ML/MIN/1.73M2 — LOW
GLUCOSE BLDC GLUCOMTR-MCNC: 104 MG/DL — HIGH (ref 70–99)
GLUCOSE BLDC GLUCOMTR-MCNC: 110 MG/DL — HIGH (ref 70–99)
GLUCOSE BLDC GLUCOMTR-MCNC: 119 MG/DL — HIGH (ref 70–99)
GLUCOSE BLDC GLUCOMTR-MCNC: 58 MG/DL — LOW (ref 70–99)
GLUCOSE BLDC GLUCOMTR-MCNC: 67 MG/DL — LOW (ref 70–99)
GLUCOSE BLDC GLUCOMTR-MCNC: 69 MG/DL — LOW (ref 70–99)
GLUCOSE BLDC GLUCOMTR-MCNC: 69 MG/DL — LOW (ref 70–99)
GLUCOSE BLDC GLUCOMTR-MCNC: 70 MG/DL — SIGNIFICANT CHANGE UP (ref 70–99)
GLUCOSE BLDC GLUCOMTR-MCNC: 75 MG/DL — SIGNIFICANT CHANGE UP (ref 70–99)
GLUCOSE BLDC GLUCOMTR-MCNC: 79 MG/DL — SIGNIFICANT CHANGE UP (ref 70–99)
GLUCOSE BLDC GLUCOMTR-MCNC: 81 MG/DL — SIGNIFICANT CHANGE UP (ref 70–99)
GLUCOSE SERPL-MCNC: 80 MG/DL — SIGNIFICANT CHANGE UP (ref 70–99)
HCT VFR BLD CALC: 37.7 % — LOW (ref 39–50)
HGB BLD-MCNC: 11.7 G/DL — LOW (ref 13–17)
MCHC RBC-ENTMCNC: 28.3 PG — SIGNIFICANT CHANGE UP (ref 27–34)
MCHC RBC-ENTMCNC: 31 G/DL — LOW (ref 32–36)
MCV RBC AUTO: 91.3 FL — SIGNIFICANT CHANGE UP (ref 80–100)
NRBC # BLD AUTO: 0 K/UL — SIGNIFICANT CHANGE UP (ref 0–0)
NRBC # FLD: 0 K/UL — SIGNIFICANT CHANGE UP (ref 0–0)
NRBC BLD AUTO-RTO: 0 /100 WBCS — SIGNIFICANT CHANGE UP (ref 0–0)
PLATELET # BLD AUTO: 158 K/UL — SIGNIFICANT CHANGE UP (ref 150–400)
PMV BLD: 9.5 FL — SIGNIFICANT CHANGE UP (ref 7–13)
POTASSIUM SERPL-MCNC: 4.7 MMOL/L — SIGNIFICANT CHANGE UP (ref 3.5–5.3)
POTASSIUM SERPL-SCNC: 4.7 MMOL/L — SIGNIFICANT CHANGE UP (ref 3.5–5.3)
RBC # BLD: 4.13 M/UL — LOW (ref 4.2–5.8)
RBC # FLD: 16.4 % — HIGH (ref 10.3–14.5)
SODIUM SERPL-SCNC: 140 MMOL/L — SIGNIFICANT CHANGE UP (ref 135–145)
TACROLIMUS SERPL-MCNC: 5.2 NG/ML — SIGNIFICANT CHANGE UP
TACROLIMUS SERPL-MCNC: 5.6 NG/ML — SIGNIFICANT CHANGE UP
WBC # BLD: 4.28 K/UL — SIGNIFICANT CHANGE UP (ref 3.8–10.5)
WBC # FLD AUTO: 4.28 K/UL — SIGNIFICANT CHANGE UP (ref 3.8–10.5)

## 2025-08-19 PROCEDURE — 99232 SBSQ HOSP IP/OBS MODERATE 35: CPT

## 2025-08-19 PROCEDURE — 99232 SBSQ HOSP IP/OBS MODERATE 35: CPT | Mod: GC

## 2025-08-19 RX ORDER — NIFEDIPINE 30 MG
60 TABLET, EXTENDED RELEASE 24 HR ORAL ONCE
Refills: 0 | Status: COMPLETED | OUTPATIENT
Start: 2025-08-19 | End: 2025-08-19

## 2025-08-19 RX ORDER — DEXTROSE 50 % IN WATER 50 %
15 SYRINGE (ML) INTRAVENOUS ONCE
Refills: 0 | Status: DISCONTINUED | OUTPATIENT
Start: 2025-08-19 | End: 2025-08-20

## 2025-08-19 RX ADMIN — METOPROLOL SUCCINATE 50 MILLIGRAM(S): 50 TABLET, EXTENDED RELEASE ORAL at 06:25

## 2025-08-19 RX ADMIN — ERTAPENEM SODIUM 100 MILLIGRAM(S): 1 INJECTION, POWDER, LYOPHILIZED, FOR SOLUTION INTRAMUSCULAR; INTRAVENOUS at 18:10

## 2025-08-19 RX ADMIN — ATORVASTATIN CALCIUM 10 MILLIGRAM(S): 80 TABLET, FILM COATED ORAL at 21:07

## 2025-08-19 RX ADMIN — Medication 81 MILLIGRAM(S): at 12:12

## 2025-08-19 RX ADMIN — LIDOCAINE HYDROCHLORIDE 1 PATCH: 20 JELLY TOPICAL at 20:00

## 2025-08-19 RX ADMIN — HEPARIN SODIUM 5000 UNIT(S): 1000 INJECTION INTRAVENOUS; SUBCUTANEOUS at 06:22

## 2025-08-19 RX ADMIN — LIDOCAINE HYDROCHLORIDE 1 PATCH: 20 JELLY TOPICAL at 12:11

## 2025-08-19 RX ADMIN — Medication 15 MILLILITER(S): at 06:23

## 2025-08-19 RX ADMIN — TAMSULOSIN HYDROCHLORIDE 0.4 MILLIGRAM(S): 0.4 CAPSULE ORAL at 20:54

## 2025-08-19 RX ADMIN — Medication 1 TABLET(S): at 12:12

## 2025-08-19 RX ADMIN — HEPARIN SODIUM 5000 UNIT(S): 1000 INJECTION INTRAVENOUS; SUBCUTANEOUS at 21:07

## 2025-08-19 RX ADMIN — LIDOCAINE HYDROCHLORIDE 1 PATCH: 20 JELLY TOPICAL at 00:23

## 2025-08-19 RX ADMIN — ATOVAQUONE 1500 MILLIGRAM(S): 750 SUSPENSION ORAL at 12:12

## 2025-08-19 RX ADMIN — Medication 15 MILLILITER(S): at 21:07

## 2025-08-19 RX ADMIN — PREDNISONE 5 MILLIGRAM(S): 20 TABLET ORAL at 06:25

## 2025-08-19 RX ADMIN — Medication 60 MILLIGRAM(S): at 21:07

## 2025-08-19 RX ADMIN — HEPARIN SODIUM 5000 UNIT(S): 1000 INJECTION INTRAVENOUS; SUBCUTANEOUS at 13:06

## 2025-08-19 RX ADMIN — Medication 60 MILLIGRAM(S): at 06:25

## 2025-08-19 RX ADMIN — Medication 15 MILLILITER(S): at 14:09

## 2025-08-19 RX ADMIN — TACROLIMUS 4 MILLIGRAM(S): 0.5 CAPSULE ORAL at 09:01

## 2025-08-20 ENCOUNTER — TRANSCRIPTION ENCOUNTER (OUTPATIENT)
Age: 75
End: 2025-08-20

## 2025-08-20 ENCOUNTER — APPOINTMENT (OUTPATIENT)
Dept: ORTHOPEDIC SURGERY | Facility: CLINIC | Age: 75
End: 2025-08-20

## 2025-08-20 VITALS
DIASTOLIC BLOOD PRESSURE: 55 MMHG | HEART RATE: 87 BPM | RESPIRATION RATE: 18 BRPM | OXYGEN SATURATION: 99 % | SYSTOLIC BLOOD PRESSURE: 156 MMHG | TEMPERATURE: 98 F

## 2025-08-20 LAB
ANION GAP SERPL CALC-SCNC: 12 MMOL/L — SIGNIFICANT CHANGE UP (ref 5–17)
BUN SERPL-MCNC: 47 MG/DL — HIGH (ref 7–23)
CALCIUM SERPL-MCNC: 9.1 MG/DL — SIGNIFICANT CHANGE UP (ref 8.4–10.5)
CHLORIDE SERPL-SCNC: 106 MMOL/L — SIGNIFICANT CHANGE UP (ref 96–108)
CO2 SERPL-SCNC: 21 MMOL/L — LOW (ref 22–31)
CREAT SERPL-MCNC: 3.46 MG/DL — HIGH (ref 0.5–1.3)
EGFR: 18 ML/MIN/1.73M2 — LOW
EGFR: 18 ML/MIN/1.73M2 — LOW
GLUCOSE BLDC GLUCOMTR-MCNC: 131 MG/DL — HIGH (ref 70–99)
GLUCOSE BLDC GLUCOMTR-MCNC: 196 MG/DL — HIGH (ref 70–99)
GLUCOSE SERPL-MCNC: 113 MG/DL — HIGH (ref 70–99)
HCT VFR BLD CALC: 34.6 % — LOW (ref 39–50)
HGB BLD-MCNC: 10.8 G/DL — LOW (ref 13–17)
MCHC RBC-ENTMCNC: 28.7 PG — SIGNIFICANT CHANGE UP (ref 27–34)
MCHC RBC-ENTMCNC: 31.2 G/DL — LOW (ref 32–36)
MCV RBC AUTO: 92 FL — SIGNIFICANT CHANGE UP (ref 80–100)
MRSA PCR RESULT.: SIGNIFICANT CHANGE UP
NRBC # BLD AUTO: 0 K/UL — SIGNIFICANT CHANGE UP (ref 0–0)
NRBC # FLD: 0 K/UL — SIGNIFICANT CHANGE UP (ref 0–0)
NRBC BLD AUTO-RTO: 0 /100 WBCS — SIGNIFICANT CHANGE UP (ref 0–0)
PLATELET # BLD AUTO: 142 K/UL — LOW (ref 150–400)
PMV BLD: 10 FL — SIGNIFICANT CHANGE UP (ref 7–13)
POTASSIUM SERPL-MCNC: 4.9 MMOL/L — SIGNIFICANT CHANGE UP (ref 3.5–5.3)
POTASSIUM SERPL-SCNC: 4.9 MMOL/L — SIGNIFICANT CHANGE UP (ref 3.5–5.3)
RBC # BLD: 3.76 M/UL — LOW (ref 4.2–5.8)
RBC # FLD: 16.5 % — HIGH (ref 10.3–14.5)
S AUREUS DNA NOSE QL NAA+PROBE: SIGNIFICANT CHANGE UP
SODIUM SERPL-SCNC: 139 MMOL/L — SIGNIFICANT CHANGE UP (ref 135–145)
TACROLIMUS SERPL-MCNC: 7 NG/ML — SIGNIFICANT CHANGE UP
WBC # BLD: 4.28 K/UL — SIGNIFICANT CHANGE UP (ref 3.8–10.5)
WBC # FLD AUTO: 4.28 K/UL — SIGNIFICANT CHANGE UP (ref 3.8–10.5)

## 2025-08-20 PROCEDURE — 82435 ASSAY OF BLOOD CHLORIDE: CPT

## 2025-08-20 PROCEDURE — 84100 ASSAY OF PHOSPHORUS: CPT

## 2025-08-20 PROCEDURE — 82947 ASSAY GLUCOSE BLOOD QUANT: CPT

## 2025-08-20 PROCEDURE — 84156 ASSAY OF PROTEIN URINE: CPT

## 2025-08-20 PROCEDURE — 82607 VITAMIN B-12: CPT

## 2025-08-20 PROCEDURE — 93005 ELECTROCARDIOGRAM TRACING: CPT

## 2025-08-20 PROCEDURE — 97116 GAIT TRAINING THERAPY: CPT

## 2025-08-20 PROCEDURE — 84133 ASSAY OF URINE POTASSIUM: CPT

## 2025-08-20 PROCEDURE — 85025 COMPLETE CBC W/AUTO DIFF WBC: CPT

## 2025-08-20 PROCEDURE — 99285 EMERGENCY DEPT VISIT HI MDM: CPT

## 2025-08-20 PROCEDURE — 85730 THROMBOPLASTIN TIME PARTIAL: CPT

## 2025-08-20 PROCEDURE — 84550 ASSAY OF BLOOD/URIC ACID: CPT

## 2025-08-20 PROCEDURE — 87640 STAPH A DNA AMP PROBE: CPT

## 2025-08-20 PROCEDURE — 36415 COLL VENOUS BLD VENIPUNCTURE: CPT

## 2025-08-20 PROCEDURE — 82962 GLUCOSE BLOOD TEST: CPT

## 2025-08-20 PROCEDURE — 81001 URINALYSIS AUTO W/SCOPE: CPT

## 2025-08-20 PROCEDURE — 83540 ASSAY OF IRON: CPT

## 2025-08-20 PROCEDURE — 83605 ASSAY OF LACTIC ACID: CPT

## 2025-08-20 PROCEDURE — 87641 MR-STAPH DNA AMP PROBE: CPT

## 2025-08-20 PROCEDURE — 80053 COMPREHEN METABOLIC PANEL: CPT

## 2025-08-20 PROCEDURE — 74176 CT ABD & PELVIS W/O CONTRAST: CPT

## 2025-08-20 PROCEDURE — 99232 SBSQ HOSP IP/OBS MODERATE 35: CPT

## 2025-08-20 PROCEDURE — 73721 MRI JNT OF LWR EXTRE W/O DYE: CPT

## 2025-08-20 PROCEDURE — 82330 ASSAY OF CALCIUM: CPT

## 2025-08-20 PROCEDURE — 83036 HEMOGLOBIN GLYCOSYLATED A1C: CPT

## 2025-08-20 PROCEDURE — 84132 ASSAY OF SERUM POTASSIUM: CPT

## 2025-08-20 PROCEDURE — 87799 DETECT AGENT NOS DNA QUANT: CPT

## 2025-08-20 PROCEDURE — 84540 ASSAY OF URINE/UREA-N: CPT

## 2025-08-20 PROCEDURE — 83970 ASSAY OF PARATHORMONE: CPT

## 2025-08-20 PROCEDURE — 70450 CT HEAD/BRAIN W/O DYE: CPT

## 2025-08-20 PROCEDURE — 87040 BLOOD CULTURE FOR BACTERIA: CPT

## 2025-08-20 PROCEDURE — 76776 US EXAM K TRANSPL W/DOPPLER: CPT

## 2025-08-20 PROCEDURE — 82570 ASSAY OF URINE CREATININE: CPT

## 2025-08-20 PROCEDURE — 87086 URINE CULTURE/COLONY COUNT: CPT

## 2025-08-20 PROCEDURE — 85027 COMPLETE CBC AUTOMATED: CPT

## 2025-08-20 PROCEDURE — 71045 X-RAY EXAM CHEST 1 VIEW: CPT

## 2025-08-20 PROCEDURE — 84443 ASSAY THYROID STIM HORMONE: CPT

## 2025-08-20 PROCEDURE — 82306 VITAMIN D 25 HYDROXY: CPT

## 2025-08-20 PROCEDURE — 85018 HEMOGLOBIN: CPT

## 2025-08-20 PROCEDURE — 85610 PROTHROMBIN TIME: CPT

## 2025-08-20 PROCEDURE — 82746 ASSAY OF FOLIC ACID SERUM: CPT

## 2025-08-20 PROCEDURE — 82803 BLOOD GASES ANY COMBINATION: CPT

## 2025-08-20 PROCEDURE — 87637 SARSCOV2&INF A&B&RSV AMP PRB: CPT

## 2025-08-20 PROCEDURE — 82550 ASSAY OF CK (CPK): CPT

## 2025-08-20 PROCEDURE — 83935 ASSAY OF URINE OSMOLALITY: CPT

## 2025-08-20 PROCEDURE — 73562 X-RAY EXAM OF KNEE 3: CPT

## 2025-08-20 PROCEDURE — 85014 HEMATOCRIT: CPT

## 2025-08-20 PROCEDURE — 97110 THERAPEUTIC EXERCISES: CPT

## 2025-08-20 PROCEDURE — 83010 ASSAY OF HAPTOGLOBIN QUANT: CPT

## 2025-08-20 PROCEDURE — 83735 ASSAY OF MAGNESIUM: CPT

## 2025-08-20 PROCEDURE — 84295 ASSAY OF SERUM SODIUM: CPT

## 2025-08-20 PROCEDURE — 97161 PT EVAL LOW COMPLEX 20 MIN: CPT

## 2025-08-20 PROCEDURE — 83550 IRON BINDING TEST: CPT

## 2025-08-20 PROCEDURE — 93971 EXTREMITY STUDY: CPT

## 2025-08-20 PROCEDURE — 82310 ASSAY OF CALCIUM: CPT

## 2025-08-20 PROCEDURE — 85652 RBC SED RATE AUTOMATED: CPT

## 2025-08-20 PROCEDURE — 85045 AUTOMATED RETICULOCYTE COUNT: CPT

## 2025-08-20 PROCEDURE — 84300 ASSAY OF URINE SODIUM: CPT

## 2025-08-20 PROCEDURE — 99232 SBSQ HOSP IP/OBS MODERATE 35: CPT | Mod: GC

## 2025-08-20 PROCEDURE — 80061 LIPID PANEL: CPT

## 2025-08-20 PROCEDURE — 93990 DOPPLER FLOW TESTING: CPT

## 2025-08-20 PROCEDURE — 87186 SC STD MICRODIL/AGAR DIL: CPT

## 2025-08-20 PROCEDURE — 80048 BASIC METABOLIC PNL TOTAL CA: CPT

## 2025-08-20 PROCEDURE — 83615 LACTATE (LD) (LDH) ENZYME: CPT

## 2025-08-20 PROCEDURE — 86140 C-REACTIVE PROTEIN: CPT

## 2025-08-20 PROCEDURE — 82728 ASSAY OF FERRITIN: CPT

## 2025-08-20 PROCEDURE — 80197 ASSAY OF TACROLIMUS: CPT

## 2025-08-20 RX ORDER — NIFEDIPINE 30 MG
1 TABLET, EXTENDED RELEASE 24 HR ORAL
Qty: 60 | Refills: 0
Start: 2025-08-20 | End: 2025-09-18

## 2025-08-20 RX ORDER — ERTAPENEM SODIUM 1 G/1
500 INJECTION, POWDER, LYOPHILIZED, FOR SOLUTION INTRAMUSCULAR; INTRAVENOUS
Qty: 5 | Refills: 0
Start: 2025-08-20 | End: 2025-08-24

## 2025-08-20 RX ORDER — MYCOPHENOLATE MOFETIL 500 MG/1
1 TABLET, FILM COATED ORAL
Refills: 0 | DISCHARGE

## 2025-08-20 RX ORDER — NIFEDIPINE 30 MG
60 TABLET, EXTENDED RELEASE 24 HR ORAL EVERY 12 HOURS
Refills: 0 | Status: DISCONTINUED | OUTPATIENT
Start: 2025-08-20 | End: 2025-08-20

## 2025-08-20 RX ADMIN — Medication 1 TABLET(S): at 11:13

## 2025-08-20 RX ADMIN — Medication 15 MILLILITER(S): at 05:39

## 2025-08-20 RX ADMIN — HEPARIN SODIUM 5000 UNIT(S): 1000 INJECTION INTRAVENOUS; SUBCUTANEOUS at 05:39

## 2025-08-20 RX ADMIN — HEPARIN SODIUM 5000 UNIT(S): 1000 INJECTION INTRAVENOUS; SUBCUTANEOUS at 13:55

## 2025-08-20 RX ADMIN — Medication 60 MILLIGRAM(S): at 08:30

## 2025-08-20 RX ADMIN — METOPROLOL SUCCINATE 50 MILLIGRAM(S): 50 TABLET, EXTENDED RELEASE ORAL at 05:38

## 2025-08-20 RX ADMIN — Medication 1 APPLICATION(S): at 11:29

## 2025-08-20 RX ADMIN — INSULIN LISPRO 1: 100 INJECTION, SOLUTION INTRAVENOUS; SUBCUTANEOUS at 12:28

## 2025-08-20 RX ADMIN — ERTAPENEM SODIUM 100 MILLIGRAM(S): 1 INJECTION, POWDER, LYOPHILIZED, FOR SOLUTION INTRAMUSCULAR; INTRAVENOUS at 11:15

## 2025-08-20 RX ADMIN — Medication 15 MILLILITER(S): at 13:55

## 2025-08-20 RX ADMIN — TACROLIMUS 4 MILLIGRAM(S): 0.5 CAPSULE ORAL at 08:33

## 2025-08-20 RX ADMIN — ATOVAQUONE 1500 MILLIGRAM(S): 750 SUSPENSION ORAL at 11:14

## 2025-08-20 RX ADMIN — LIDOCAINE HYDROCHLORIDE 1 PATCH: 20 JELLY TOPICAL at 11:13

## 2025-08-20 RX ADMIN — Medication 81 MILLIGRAM(S): at 11:14

## 2025-08-20 RX ADMIN — PREDNISONE 5 MILLIGRAM(S): 20 TABLET ORAL at 05:39

## 2025-09-03 ENCOUNTER — RX RENEWAL (OUTPATIENT)
Age: 75
End: 2025-09-03

## 2025-09-05 RX ORDER — ATOVAQUONE 750 MG/5ML
10 SUSPENSION ORAL
Refills: 0 | DISCHARGE

## 2025-09-05 RX ORDER — CITRIC ACID/SODIUM CITRATE 300-500 MG
15 SOLUTION, ORAL ORAL
Refills: 0 | DISCHARGE

## 2025-09-05 RX ORDER — CALCIUM CARBONATE 750 MG/1
1200 TABLET ORAL
Refills: 0 | DISCHARGE

## 2025-09-10 ENCOUNTER — TRANSCRIPTION ENCOUNTER (OUTPATIENT)
Age: 75
End: 2025-09-10

## 2025-09-11 ENCOUNTER — TRANSCRIPTION ENCOUNTER (OUTPATIENT)
Age: 75
End: 2025-09-11

## 2025-09-11 DIAGNOSIS — Z94.0 KIDNEY TRANSPLANT STATUS: ICD-10-CM

## 2025-09-15 ENCOUNTER — APPOINTMENT (OUTPATIENT)
Dept: NEUROLOGY | Facility: CLINIC | Age: 75
End: 2025-09-15
Payer: MEDICARE

## 2025-09-15 VITALS
DIASTOLIC BLOOD PRESSURE: 61 MMHG | HEART RATE: 91 BPM | WEIGHT: 185 LBS | SYSTOLIC BLOOD PRESSURE: 134 MMHG | BODY MASS INDEX: 26.48 KG/M2 | HEIGHT: 70 IN

## 2025-09-15 DIAGNOSIS — R56.9 UNSPECIFIED CONVULSIONS: ICD-10-CM

## 2025-09-15 PROCEDURE — 99496 TRANSJ CARE MGMT HIGH F2F 7D: CPT

## 2025-09-17 ENCOUNTER — APPOINTMENT (OUTPATIENT)
Dept: TRANSPLANT | Facility: CLINIC | Age: 75
End: 2025-09-17

## 2025-09-19 ENCOUNTER — APPOINTMENT (OUTPATIENT)
Dept: NEPHROLOGY | Facility: CLINIC | Age: 75
End: 2025-09-19
Payer: MEDICARE

## 2025-09-19 VITALS
RESPIRATION RATE: 15 BRPM | DIASTOLIC BLOOD PRESSURE: 70 MMHG | HEIGHT: 70 IN | HEART RATE: 84 BPM | SYSTOLIC BLOOD PRESSURE: 164 MMHG | WEIGHT: 194 LBS | BODY MASS INDEX: 27.77 KG/M2 | TEMPERATURE: 209.66 F | OXYGEN SATURATION: 98 %

## 2025-09-19 LAB
ALBUMIN SERPL ELPH-MCNC: 3.9 G/DL
ALP BLD-CCNC: 113 U/L
ALT SERPL-CCNC: 19 U/L
ANION GAP SERPL CALC-SCNC: 15 MMOL/L
APPEARANCE: CLEAR
AST SERPL-CCNC: 31 U/L
BACTERIA: NEGATIVE /HPF
BILIRUB SERPL-MCNC: 0.4 MG/DL
BILIRUBIN URINE: NEGATIVE
BKV DNA SPEC QL NAA+PROBE: NOT DETECTED IU/ML
BLOOD URINE: ABNORMAL
BUN SERPL-MCNC: 51 MG/DL
CALCIUM SERPL-MCNC: 9.5 MG/DL
CAST: 0 /LPF
CHLORIDE SERPL-SCNC: 108 MMOL/L
CMV DNA SPEC QL NAA+PROBE: ABNORMAL IU/ML
CMVPCR LOG: ABNORMAL LOG10IU/ML
CO2 SERPL-SCNC: 22 MMOL/L
COLOR: YELLOW
CREAT SERPL-MCNC: 3.87 MG/DL
CREAT SPEC-SCNC: 173 MG/DL
CREAT/PROT UR: 1.2 RATIO
EGFRCR SERPLBLD CKD-EPI 2021: 15 ML/MIN/1.73M2
EPITHELIAL CELLS: 3 /HPF
GLUCOSE QUALITATIVE U: NEGATIVE MG/DL
GLUCOSE SERPL-MCNC: 95 MG/DL
HCT VFR BLD CALC: 31.6 %
HGB BLD-MCNC: 9.4 G/DL
KETONES URINE: NEGATIVE MG/DL
LEUKOCYTE ESTERASE URINE: ABNORMAL
MAGNESIUM SERPL-MCNC: 1.6 MG/DL
MCHC RBC-ENTMCNC: 29.6 PG
MCHC RBC-ENTMCNC: 29.7 G/DL
MCV RBC AUTO: 99.4 FL
MICROSCOPIC-UA: NORMAL
NITRITE URINE: NEGATIVE
PH URINE: 5.5
PHOSPHATE SERPL-MCNC: 3.8 MG/DL
PLATELET # BLD AUTO: 216 K/UL
POTASSIUM SERPL-SCNC: 4.7 MMOL/L
PROT SERPL-MCNC: 6.8 G/DL
PROT UR-MCNC: 212 MG/DL
PROTEIN URINE: 300 MG/DL
RBC # BLD: 3.18 M/UL
RBC # FLD: 16.9 %
RED BLOOD CELLS URINE: 0 /HPF
SODIUM SERPL-SCNC: 146 MMOL/L
SPECIFIC GRAVITY URINE: 1.02
UROBILINOGEN URINE: 0.2 MG/DL
WBC # FLD AUTO: 6.19 K/UL
WHITE BLOOD CELLS URINE: 15 /HPF

## 2025-09-19 PROCEDURE — 99214 OFFICE O/P EST MOD 30 MIN: CPT

## 2025-09-22 LAB — TACROLIMUS SERPL-MCNC: 2.9 NG/ML

## 2025-09-26 PROBLEM — Z85.46 HISTORY OF MALIGNANT NEOPLASM OF PROSTATE: Status: RESOLVED | Noted: 2021-03-23 | Resolved: 2025-09-26

## (undated) DEVICE — TRAP SPECIMEN SPUTUM 40CC

## (undated) DEVICE — SUT BIOSYN 4-0 18" P-12

## (undated) DEVICE — DRSG OPSITE 13.75 X 4"

## (undated) DEVICE — STAPLER SKIN VISI-STAT 35 WIDE

## (undated) DEVICE — WARMING BLANKET UPPER ADULT

## (undated) DEVICE — VESSEL LOOP MINI-BLUE 0.075" X 16"

## (undated) DEVICE — SOL IRR POUR H2O 250ML

## (undated) DEVICE — PREP BETADINE SPONGE STICKS

## (undated) DEVICE — DRAPE TOWEL BLUE 17" X 24"

## (undated) DEVICE — SOL IRR BAG NS 0.9% 3000ML

## (undated) DEVICE — SOL IRR POUR H2O 500ML

## (undated) DEVICE — VESSEL LOOP MAXI-BLUE 0.120" X 16"

## (undated) DEVICE — VENODYNE/SCD SLEEVE CALF MEDIUM

## (undated) DEVICE — DRSG TEGADERM 6 X 8"

## (undated) DEVICE — DRAPE THYROID 77" X 123"

## (undated) DEVICE — CLAMP BULLDOG MIDI STRAIGHT (YELLOW) DISP

## (undated) DEVICE — CANISTER DISPOSABLE THIN WALL 3000CC

## (undated) DEVICE — DRSG COMBINE 5X9"

## (undated) DEVICE — ELCTR SUPER SECT

## (undated) DEVICE — GLV 7.5 PROTEXIS (WHITE)

## (undated) DEVICE — BLADE SCALPEL SAFETYLOCK #10

## (undated) DEVICE — DRAPE LIGHT HANDLE COVER (BLUE)

## (undated) DEVICE — CABLE DAC ACTIVE CORD

## (undated) DEVICE — DRAPE C ARM UNIVERSAL

## (undated) DEVICE — TUBING TUR 2 PRONG

## (undated) DEVICE — PRESSURE INFUSOR BAG 3000ML

## (undated) DEVICE — GLV 8 PROTEXIS (CREAM) MICRO

## (undated) DEVICE — SUT PROLENE 7-0 24" BV-1

## (undated) DEVICE — BAG URINE W METER 2L

## (undated) DEVICE — BLADE SCALPEL SAFETYLOCK #11

## (undated) DEVICE — GOWN TRIMAX LG

## (undated) DEVICE — TUBING IRR SET FOR CYSTOSCOPY 77"

## (undated) DEVICE — WARMING BLANKET LOWER ADULT

## (undated) DEVICE — FOLEY CATH 2-WAY 18FR 5CC LATEX HYDROGEL

## (undated) DEVICE — POSITIONER STRAP ARMBOARD VELCRO TS-30

## (undated) DEVICE — POSITIONER PATIENT SAFETY STRAP 3X60"

## (undated) DEVICE — POSITIONER FOAM EGG CRATE ULNAR 2PCS (PINK)

## (undated) DEVICE — SUCTION YANKAUER NO CONTROL VENT

## (undated) DEVICE — LUBRICATING JELLY ONESHOT 1.25OZ

## (undated) DEVICE — DRSG 4 X 4" 6PLY

## (undated) DEVICE — SYR LUER LOK 10CC

## (undated) DEVICE — SPECIMEN CONTAINER 100ML

## (undated) DEVICE — SUT SOFSILK 4-0 18" TIES

## (undated) DEVICE — SOL INJ NS 0.9% 500ML 1-PORT

## (undated) DEVICE — SUT SOFSILK 2-0 18" TIES

## (undated) DEVICE — LIJ/LIA-ADAPTER LCKNG ELLIK EVACUATING: Type: DURABLE MEDICAL EQUIPMENT

## (undated) DEVICE — TUBING LEVEL ONE NORMOFLO SET

## (undated) DEVICE — DRAPE LEGGINGS

## (undated) DEVICE — PACK CYSTO

## (undated) DEVICE — DRAPE INSTRUMENT POUCH 6.75" X 11"

## (undated) DEVICE — MEDICATION LABELS W MARKER

## (undated) DEVICE — GLV 7.5 PROTEXIS (BLUE)

## (undated) DEVICE — PACK AV FISTULA

## (undated) DEVICE — SUT PROLENE 6-0 4-30" C-1

## (undated) DEVICE — DRSG STERISTRIPS 0.5 X 4"

## (undated) DEVICE — PACK MINOR

## (undated) DEVICE — IRR BULB PATHFINDER + 10"

## (undated) DEVICE — ELCTR PLASMA LOOP MEDIUM 24FR 12-30 DEG

## (undated) DEVICE — BAR ROLLER FOR ELITE ELCTR

## (undated) DEVICE — SOL IRR POUR NS 0.9% 500ML

## (undated) DEVICE — BLADE SCALPEL SAFETYLOCK #15

## (undated) DEVICE — ADAPTER CHECK FLO 9FR STERILE

## (undated) DEVICE — SOL IRR GLYCINE 1.5% 3000L

## (undated) DEVICE — SUT POLYSORB 3-0 30" V-20 UNDYED

## (undated) DEVICE — TUBING SMOKE EVACUATOR 6FT

## (undated) DEVICE — TUBING SUCTION ENDOMAT LC

## (undated) DEVICE — DRAPE COVER SNAP 36X30"

## (undated) DEVICE — SOL IRR BAG H2O 3000ML

## (undated) DEVICE — TAPE SILK 3"

## (undated) DEVICE — SYR CATH TIP 2 OZ

## (undated) DEVICE — GLV 8 PROTEXIS (WHITE)

## (undated) DEVICE — GLV 7.5 PROTEXIS (CREAM) MICRO

## (undated) DEVICE — SUT SILK 3-0 18" TIES

## (undated) DEVICE — PREP CHLORAPREP HI-LITE ORANGE 26ML

## (undated) DEVICE — LABELS BLANK W PEN

## (undated) DEVICE — SUT PROLENE 6-0 4-30" BV-1

## (undated) DEVICE — BASIN SET DOUBLE

## (undated) DEVICE — GOWN XL W TOWEL